# Patient Record
Sex: FEMALE | Race: WHITE | NOT HISPANIC OR LATINO | Employment: OTHER | URBAN - METROPOLITAN AREA
[De-identification: names, ages, dates, MRNs, and addresses within clinical notes are randomized per-mention and may not be internally consistent; named-entity substitution may affect disease eponyms.]

---

## 2017-01-16 ENCOUNTER — OFFICE VISIT (OUTPATIENT)
Dept: UROLOGY | Facility: CLINIC | Age: 82
End: 2017-01-16
Payer: MEDICARE

## 2017-01-16 ENCOUNTER — TELEPHONE (OUTPATIENT)
Dept: INTERNAL MEDICINE | Facility: CLINIC | Age: 82
End: 2017-01-16

## 2017-01-16 VITALS — HEART RATE: 92 BPM | WEIGHT: 107 LBS | OXYGEN SATURATION: 90 % | BODY MASS INDEX: 21.26 KG/M2

## 2017-01-16 DIAGNOSIS — C67.4 MALIGNANT NEOPLASM OF POSTERIOR WALL OF URINARY BLADDER (H): Primary | ICD-10-CM

## 2017-01-16 DIAGNOSIS — N35.9 URETHRAL STRICTURE, UNSPECIFIED STRICTURE TYPE: ICD-10-CM

## 2017-01-16 DIAGNOSIS — C91.10 CLL (CHRONIC LYMPHOCYTIC LEUKEMIA) (H): Primary | ICD-10-CM

## 2017-01-16 LAB
ALBUMIN UR-MCNC: NEGATIVE MG/DL
APPEARANCE UR: CLEAR
BILIRUB UR QL STRIP: NEGATIVE
COLOR UR AUTO: YELLOW
GLUCOSE UR STRIP-MCNC: NEGATIVE MG/DL
HGB UR QL STRIP: NEGATIVE
KETONES UR STRIP-MCNC: NEGATIVE MG/DL
LEUKOCYTE ESTERASE UR QL STRIP: NEGATIVE
NITRATE UR QL: NEGATIVE
PH UR STRIP: 6 PH (ref 5–7)
SP GR UR STRIP: 1.01 (ref 1–1.03)
URN SPEC COLLECT METH UR: NORMAL
UROBILINOGEN UR STRIP-ACNC: 0.2 EU/DL (ref 0.2–1)

## 2017-01-16 PROCEDURE — 52281 CYSTOSCOPY AND TREATMENT: CPT | Performed by: UROLOGY

## 2017-01-16 PROCEDURE — 81003 URINALYSIS AUTO W/O SCOPE: CPT | Performed by: UROLOGY

## 2017-01-16 ASSESSMENT — PAIN SCALES - GENERAL: PAINLEVEL: NO PAIN (0)

## 2017-01-16 NOTE — MR AVS SNAPSHOT
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 We suggest that you continue to avoid caffeine, fruit juice, and alcohol for the next 24 hours, however, you are encouraged to return to your normal activities.       A few things that are considered normal after your cystoscopy:    * small amount of bleeding (or spotting) that clears within the next 24 hours    * slight burning sensation with urination    * sensation to of needing to avoid more frequently    * the feeling of \"air\" in your urine    * mild discomfort that is relieved with Tylonol        Please contact our office promptly if you:    * develop a fever above 101 degrees    * are unable to urinate    * develop bright red blood that does not stop    * severe pain or swelling        And of course, please contact our office with any concerns or questions [phone]              Follow-ups after your visit        Follow-up notes from your care team     Return in about 4 months (around 5/16/2017) for BTC.      Your next 10 appointments already scheduled     May 09, 2017 10:20 AM   [facility]    [address] [facility] [facility]  [address]  [facility] [id number] [phone]            May 09, 2017 11:00 AM   Level 1 with  " INFUSION CHAIR 11   [facility] ([facility])    [facility]  [address]  [place] [address] [phone]              Who to contact     If you have questions or need follow up information about today's clinic visit or your schedule please contact [facility] directly at [phone].  Normal or non-critical lab and imaging results will be communicated to you by [employer], letter or phone within 4 business days after the clinic has received the results. If you do not hear from us within 7 days, please contact the clinic through [employer] or phone. If you have a critical or abnormal lab result, we will notify you by phone as soon as possible.  Submit refill requests through [employer] or call your pharmacy and they will forward the refill request to us. Please allow 3 business days for your refill to be completed.          Additional Information About Your Visit        [employer] Information     [employer] gives you secure access to your electronic health record. If you see a primary care provider, you can also send messages to your care team and make appointments. If you have questions, please call your primary care clinic.  If you do not have a primary care provider, please call [phone] and they will assist you.        Care EveryWhere ID     This is your Care EveryWhere ID. This could be used by other organizations to access your [facility] medical records  [id number]        Your Vitals Were     Pulse Pulse Oximetry                92 90%           Blood Pressure from Last 3 Encounters:   12/29/16 120/68   10/24/16 128/64   09/15/16 110/62    Weight from Last 3 Encounters:   01/16/17 48.535 kg (107 lb)   12/29/16 48.762 kg (107 lb 8 oz)   10/24/16 49.034 kg (108 lb 1.6 oz)              We Performed the Following     CYSTOSCOPY W DIL URETHRAL STRICTURE/CALIBRATION ([id number])     UA without  [facility]        Primary Care Provider Office Phone # Fax #    Piper Kiser [phone]       [facility] [address]  [facility] [address]        Thank you!     Thank you for choosing [facility] [place]  for your care. Our goal is always to provide you with excellent care. Hearing back from our patients is one way we can continue to improve our services. Please take a few minutes to complete the written survey that you may receive in the mail after your visit with us. Thank you!             Your Updated Medication List - Protect others around you: Learn how to safely use, store and throw away your medicines at www.disposemymeds.org.          This list is accurate as of: 1/16/17  2:17 PM.  Always use your most recent med list.                   Brand Name Dispense Instructions for use    * albuterol 108 (90 BASE) MCG/ACT Inhaler    PROAIR HFA/PROVENTIL HFA/VENTOLIN HFA    1 Inhaler    Inhale 2 puffs into the lungs every 6 hours as needed for wheezing       * albuterol (2.5 MG/3ML) 0.083% neb solution     360 mL    Take 1 vial (2.5 mg) by nebulization every 6 hours as needed for shortness of breath / dyspnea or wheezing       ALEVE PO      Take by mouth 2 times daily (with meals)       atorvastatin 20 MG tablet    LIPITOR    90 tablet    Take 1 tablet (20 mg) by mouth daily       BENADRYL 25 MG capsule   Generic drug:  diphenhydrAMINE      Take 25 mg by mouth At Bedtime       FERROUS GLUCONATE PO      Take 324 mg by mouth daily (with breakfast)       fluticasone-salmeterol 250-50 MCG/DOSE diskus inhaler    ADVAIR    1 Inhaler    Inhale 1 puff into the lungs every 12 hours       furosemide 20 MG tablet    LASIX    30 tablet    Take 1 tablet (20 mg) by mouth daily       gabapentin 300 MG capsule    NEURONTIN    90 capsule    Take 1 capsule (300 mg) by mouth 3 times daily       levofloxacin 500 MG tablet    LEVAQUIN    10 tablet    Take 1  tablet (500 mg) by mouth daily       levothyroxine 75 MCG tablet    SYNTHROID/LEVOTHROID    90 tablet    Take 1 tablet (75 mcg) by mouth daily       metoprolol 25 MG 24 hr tablet    TOPROL-XL    90 tablet    Take 1 tablet (25 mg) by mouth daily       MULTIVITAMIN GUMMIES ADULT PO      Take by mouth daily       * order for DME     3 Month    Equipment being ordered: Oxygen. 2L/minute continuous.       * order for DME     1 each    Equipment being ordered: Nebulizer kit       * order for DME     1 Device    Equipment being ordered: conserving devise for portable O2       * order for DME     1 Device    Equipment being ordered: Oxygen, portable       predniSONE 10 MG tablet    DELTASONE    15 tablet    2 daily for 5 days then 1 daily for 5 days.       traZODone 50 MG tablet    DESYREL    30 tablet    Take 1 tablet (50 mg) by mouth nightly as needed for sleep       umeclidinium 62.5 MCG/INH oral inhaler    INCRUSE ELLIPTA    1 each    Inhale 1 puff (62.5 mcg) into the lungs daily       VITAMIN C PO          vitamin D 2000 UNITS tablet     100 tablet    Take 2,000 Units by mouth daily       zolpidem 5 MG tablet    AMBIEN    30 tablet    Take 1 tablet (5 mg) by mouth nightly as needed for sleep       * Notice:  This list has 6 medication(s) that are the same as other medications prescribed for you. Read the directions carefully, and ask your doctor or other care provider to review them with you.

## 2017-01-16 NOTE — Clinical Note
[facility]  [address], [address]  [place], [place]  [address]                                            TEL:[phone]  FAX:[phone]      Marie Kline  [address] [place] [address] [address]  [facility] [address]          January 19, 2017      To whom this may concern,    Please draw a BMP and a CBC on the patient above. Once results are in- please fax results to Attention: Dr Kiser at [phone].    Dx: C91.10 Chronic Lymphocytic Leukemia          Thank You        Sincerely,      Piper Kiser M.D.

## 2017-01-16 NOTE — PATIENT INSTRUCTIONS
"     AFTER YOUR CYSTOSCOPY         You have just completed a cystoscopy, or \"cysto\", which allowed your physician to learn more about your bladder (or to remove a stent placed after surgery). We suggest that you continue to avoid caffeine, fruit juice, and alcohol for the next 24 hours, however, you are encouraged to return to your normal activities.       A few things that are considered normal after your cystoscopy:    * small amount of bleeding (or spotting) that clears within the next 24 hours    * slight burning sensation with urination    * sensation to of needing to avoid more frequently    * the feeling of \"air\" in your urine    * mild discomfort that is relieved with Tylonol        Please contact our office promptly if you:    * develop a fever above 101 degrees    * are unable to urinate    * develop bright red blood that does not stop    * severe pain or swelling        And of course, please contact our office with any concerns or questions [phone]        "

## 2017-01-16 NOTE — PROGRESS NOTES
Marie Klineclementina 84-year-old with a history of superficial transitional cell carcinoma the bladder to the chair for 4 month visit.  Her last tumor was in February of last year.  She also has a history of CLL and COPD.  She has noticed a more narrow urine stream, no gross hematuria  Medications:reviewed  Allergies: IV contrast  Urinalysis: Normal  Flexible cystoscopy-normal urethral mucosa and one tiny area of erythema on the right posterior lateral wall consistent with inflammation versus an early papillary tumor.  Mild urethral stenosis noted and dilated with a 20 Mongolian Magnus sound  Assessment: Mild urethral stenosis, history of transitional cell carcinoma of the bladder-possible early papillary tumor right posterior lateral wall  Plan: Macrobid ×1 tonight, follow up in 4 months for office cystoscopy

## 2017-01-16 NOTE — Clinical Note
1/16/2017      RE: Marie Kline  [address] [facility] [address] [facility]    [facility] [facility] [address]       Marie Klinephysical 84-year-old with a history of superficial transitional cell carcinoma the bladder to the chair for 4 month visit.  Her last tumor was in February of last year.  She also has a history of CLL and COPD.  She has noticed a more narrow urine stream, no gross hematuria  Medications:reviewed  Allergies: IV contrast  Urinalysis: Normal  Flexible cystoscopy-normal urethral mucosa and one tiny area of erythema on the right posterior lateral wall consistent with inflammation versus an early papillary tumor.  Mild urethral stenosis noted and dilated with a 20 Kyr[place]z [place] sound  Assessment: Mild urethral stenosis, history of transitional cell carcinoma of the bladder-possible early papillary tumor right posterior lateral wall  Plan: Macrobid ×1 tonight, follow up in 4 months for office cystoscopy        Kar Nuñez MD

## 2017-01-16 NOTE — TELEPHONE ENCOUNTER
Pt walks in, states to pt representative that her jaw is clicking and her dentist is wondering if may be r/t Prolia. Pt had to leave before a triage nurse could discuss this with her.     Please advise, thanks.

## 2017-01-17 ENCOUNTER — MYC MEDICAL ADVICE (OUTPATIENT)
Dept: INTERNAL MEDICINE | Facility: CLINIC | Age: 82
End: 2017-01-17

## 2017-01-17 NOTE — TELEPHONE ENCOUNTER
Patient advised of results. Patient is wondering if you could order lab for her calcium levels through her oncologist Dr Ivory [phone]. Please advise.  MATTHEW HUMPHRIES,KEIKO

## 2017-01-17 NOTE — TELEPHONE ENCOUNTER
"Unlikely secondary to Prolia if just clicking noise.  Per uptodate, signs of possible osteonecrosis include:  \"ONJ may manifest as jaw pain, osteomyelitis, osteitis, bone erosion, tooth/periodontal infection, toothache, gingival ulceration/erosion\"    Consider TMJ.   "

## 2017-01-19 NOTE — TELEPHONE ENCOUNTER
Letter completed and set on Dr Kiser's desk- please fax once signed by Dr Kiser. Pt advised.     Enc closed.

## 2017-01-23 ENCOUNTER — TRANSFERRED RECORDS (OUTPATIENT)
Dept: HEALTH INFORMATION MANAGEMENT | Facility: CLINIC | Age: 82
End: 2017-01-23

## 2017-01-23 LAB
ALT SERPL-CCNC: 10 IU/L (ref 7–52)
AST SERPL-CCNC: 24 U/L (ref 13–39)
CREAT SERPL-MCNC: 1.07 MG/DL (ref 0.6–1.3)
GLUCOSE SERPL-MCNC: 74 MG/DL (ref 70–110)
POTASSIUM SERPL-SCNC: 3.8 MMOL/L (ref 3.5–5.1)

## 2017-01-31 ENCOUNTER — OFFICE VISIT (OUTPATIENT)
Dept: DERMATOLOGY | Facility: CLINIC | Age: 82
End: 2017-01-31
Payer: MEDICARE

## 2017-01-31 DIAGNOSIS — D69.2 PURPURA SENILIS (H): Primary | ICD-10-CM

## 2017-01-31 DIAGNOSIS — L85.3 XEROSIS CUTIS: ICD-10-CM

## 2017-01-31 PROCEDURE — 99202 OFFICE O/P NEW SF 15 MIN: CPT | Performed by: DERMATOLOGY

## 2017-01-31 NOTE — Clinical Note
1/31/2017      RE: Marie Kline  [address]  [place] [address]               DERMATOLOGY CLINIC VISIT NOTE      CHIEF COMPLAINT:  Purple marks on legs.      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      HISTORY OF PRESENT ILLNESS:  Ms. Kline is an 84-year-old female presenting to Dermatology Clinic for initial evaluation of purple marks on her legs.  She was seen at the request of her oncologist, Dr. Isabel Ivory with [facility] in [place].  The patient was sent for evaluation of purple marks on her bilateral lower extremities.  The patient has past history of chronic lymphocytic leukemia in addition to past history of bladder cancer.      The patient states she is unsure as to why she is being seen in Dermatology.  She notes that her skin bruises quite easily and is susceptible to trauma.  She has history of COPD and has been on many courses of systemic steroids during her lifetime.  She notes that the bruising occurs both on her legs as well as on her arms.  She notes that the bruising has not worsened over time.  She has otherwise been feeling well.   Patient Active Problem List   Diagnosis     Acute and chronic respiratory failure with hypercapnia (HCC)     Nonischemic cardiomyopathy (H)     Pneumonia     Acute myocardial infarction, initial episode of care (HCC)     CLL (chronic lymphocytic leukemia) (HCC)     CHF (congestive heart failure) (HCC)     Cardiomyopathy in other diseases classified elsewhere (HCC)     Hyperlipidemia with target LDL less than 130     Health Care Home     COPD exacerbation (H)     LESLY (generalized anxiety disorder)     Hypothyroidism     Back pain with radiation     DDD (degenerative disc disease), lumbar     Splenomegaly     Lumbar degenerative disc disease     Lumbar foraminal stenosis     Central spinal stenosis     Bladder cancer (H)      Sepsis (H)     ACP (advance care planning)     Senile osteoporosis     CKD (chronic kidney disease) stage 3, GFR 30-59 ml/min       Allergies   Allergen Reactions     Contrast Dye Hives         Current Outpatient Prescriptions   Medication     Multiple Vitamins-Minerals (MULTIVITAMIN GUMMIES ADULT PO)     furosemide (LASIX) 20 MG tablet     levofloxacin (LEVAQUIN) 500 MG tablet     predniSONE (DELTASONE) 10 MG tablet     metoprolol (TOPROL-XL) 25 MG 24 hr tablet     traZODone (DESYREL) 50 MG tablet     Ascorbic Acid (VITAMIN C PO)     Naproxen Sodium (ALEVE PO)     umeclidinium (INCRUSE ELLIPTA) 62.5 MCG/INH oral inhaler     albuterol (2.5 MG/3ML) 0.083% nebulizer solution     zolpidem (AMBIEN) 5 MG tablet     gabapentin (NEURONTIN) 300 MG capsule     FERROUS GLUCONATE PO     levothyroxine (SYNTHROID, LEVOTHROID) 75 MCG tablet     albuterol (PROAIR HFA, PROVENTIL HFA, VENTOLIN HFA) 108 (90 BASE) MCG/ACT inhaler     atorvastatin (LIPITOR) 20 MG tablet     diphenhydrAMINE (BENADRYL) 25 MG capsule     order for DME     fluticasone-salmeterol (ADVAIR) 250-50 MCG/DOSE diskus inhaler     order for DME     order for DME     order for DME     Cholecalciferol (VITAMIN D) 2000 UNITS tablet     No current facility-administered medications for this visit.            SOCIAL HISTORY:  The patient is .  She lives in [place].  She has multiple family  members who live in the area and take her to medical appointments.      REVIEW OF SYSTEMS:  Feeling well without additional skin concerns.  No other symptoms of lumps or bumps on the skin or epistaxis or bleeding gums.      PHYSICAL EXAMINATION:   There were no vitals taken for this visit.    GENERAL:  Ms. Kline is a healthy-appearing 84-year-old female in no distress.   HEENT:  Conjunctivae are clear.   PULMONARY:  Breathing comfortably on room air.   SKIN:  Examination included the bilateral arms, legs, face and neck.  Skin exam was normal except for as follows:   --  Examination of the left calf shows a linear collection of purpuric patches extending from the ankles to the midcalf.  There are smaller linear collections of purpuric patches on the lateral calf.   -- There is a linear collection of purpuric patches on the right calf at the site of past injury by her dog's paws.  Skin is overall atrophic.  It is diffusely xerotic.     -- Scattered ecchymotic patches on the bilateral forearms.      ASSESSMENT AND PLAN:    Senile purpura in the setting of chronic corticosteroid use.  I see no signs of leukocytoclastic vasculitis on the extremities.  The purpura is in linear arrays that correspond quite well with exogenous sources such as scratching of the skin.  I did not suggest biopsy today given low suspicion for an underlying vasculitic process.  I suspect that the patient will have ongoing purpura in the skin from skin atrophy related to her chronic steroid exposure.      Should Dr. Ivory with Hematology/Oncology feel that biopsy is warranted to rule out vasculitis, Ms. Kline may return to Dermatology Clinic.        In regard to her xerosis, I suggested use of a thick emollient and a gentle cleanser when bathing.      The patient to return as needed.       Jeannette Harris MD  Dermatology Staff       cc:   Isabel Ivory MD   [facility]   [address], [facility] [address]   [facility] [address]      Piper Kiser MD   [facility]   [address] [place] [address]                    D: 2017 16:34   T: 2017 11:25   MT: SURAJ      Name:     MAGGI KLINE   MRN:      [id number]        Account:      [id number]   :      1932           Service Date: 2017      Document: [id number]      Jeannette Harris MD

## 2017-01-31 NOTE — PATIENT INSTRUCTIONS
Pediatric Dermatology  [facility]  [address]  1st [place]  [address]  Phone: [phone]    Pediatric & Adult Dermatology  [facility]  [address] [facility]   2nd Floor  [facility] [address]  [phone]                  General information: Dr. Jeannette Harris is a board-certified dermatologist with subspecialty certification in pediatric dermatology.     Scheduling and Nurse Triage: Dr. Harris sees pediatric patients on Mondays in [place] and adult and pediatric patients on Tuesdays in [place]. The remainder of the week she practices at the [facility]. Please call the above phone numbers to schedule or to talk to a nurse.     -For scheduling at the [place] or [place] locations, or to talk to the triage nurse please call the above phone number at the clinic where you were seen.     -For medication refills, please call your pharmacy.           Pediatric Dermatology  [address]  [place] [address]  [phone]    Gentle Skin Care  Below is a list of products our providers recommend for gentle skin care.  Moisturizers:    Lighter; Cetaphil Cream, CeraVe, Aveeno and Vanicream Light     Thicker; Aquaphor Ointment, Vaseline, Petrolium Jelly, Eucerin and Vanicream    Avoid Lotions (too thin)  Mild Cleansers:    Dove- Fragrance Free    CeraVe     Vanicream Cleansing Bar    Cetaphil Cleanser     Aquaphor 2 in1 Gentle Wash and Shampoo       Laundry Products:    All Free and Clear    Cheer Free    Generic Brands are okay as long as they are  Fragrance Free      Avoid fabric softeners  and dryer sheets   Sunscreens: SPF 30 or greater     Sunscreens that contain Zinc Oxide or Titanium Dioxide should be applied, these are physical blockers. Spray or  chemical  sunscreens should be avoided.        Shampoo and Conditioners:    Free  "and Clear by Vanicream    Aquaphor 2 in 1 Gentle Wash and Shampoo    California Baby  super sensitive   Oils:    Mineral Oil     Emu Oil     For some patients, coconut and sunflower seed oil      Generic Products are an okay substitute, but make sure they are fragrance free.  *Avoid product that have fragrance added to them. Organic does not mean  fragrance free.  In fact patients with sensitive skin can become quite irritated by organic products.     1. Daily bathing is recommended. Make sure you are applying a good moisturizer after bathing every time.  2. Use Moisturizing creams at least twice daily to the whole body. Your provider may recommend a lighter or heavier moisturizer based on your child s severity and that time of year it is.  3. Creams are more moisturizing than lotions  4. Products should be fragrance free- soaps, creams, detergents.  Products such as Maurice and Maurice as well as the Cetaphil \"Baby\" line contain fragrance and may irritate your child's sensitive skin.    Care Plan:  1. Keep bathing and showering short, less than 15 minutes   2. Always use lukewarm warm when possible. AVOID very HOT or COLD water  3. DO NOT use bubble bath  4. Limit the use of soaps. Focus on the skin folds, face, armpits, groin and feet  5. Do NOT vigorously scrub when you cleanse your skin  6. After bathing, PAT your skin lightly with a towel. DO NOT rub or scrub when drying  7. ALWAYS apply a moisturizer immediately after bathing. This helps to  lock in  the moisture. * IF YOU WERE PRESCRIBED A TOPICAL MEDICATION, APPLY YOUR MEDICATION FIRST THEN COVER WITH YOUR DAILY MOISTURIZER  8. Reapply moisturizing agents at least twice daily to your whole body  9. Do not use products such as powders, perfumes, or colognes on your skin  10. Avoid saunas and steam baths. This temperature is too HOT  11. Avoid tight or  scratchy  clothing such as wool  12. Always wash new clothing before wearing them for the first " time  13. Sometimes a humidifier or vaporizer can be used at night can help the dry skin. Remember to keep it clean to avoid mold growth.  14.

## 2017-01-31 NOTE — NURSING NOTE
"Chief Complaint   Patient presents with     Consult     spots on both legs front and back        Initial There were no vitals taken for this visit. Estimated body mass index is 21.26 kg/(m^2) as calculated from the following:    Height as of 10/24/16: 4' 11.5\" (1.511 m).    Weight as of 1/16/17: 107 lb (48.535 kg).  BP completed using cuff size: NA (Not Taken)  Marcia Abdullahi MA      "

## 2017-01-31 NOTE — Clinical Note
1/31/2017      RE: Marie Kline  [address]  [facility] [address]               DERMATOLOGY CLINIC VISIT NOTE      CHIEF COMPLAINT:  Purple marks on legs.      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      HISTORY OF PRESENT ILLNESS:  Ms. Kline is an 84-year-old female presenting to Dermatology Clinic for initial evaluation of purple marks on her legs.  She was seen at the request of her oncologist, Dr. Isabel Ivory with [facility] in [place].  The patient was sent for evaluation of purple marks on her bilateral lower extremities.  The patient has past history of chronic lymphocytic leukemia in addition to past history of bladder cancer.      The patient states she is unsure as to why she is being seen in Dermatology.  She notes that her skin bruises quite easily and is susceptible to trauma.  She has history of COPD and has been on many courses of systemic steroids during her lifetime.  She notes that the bruising occurs both on her legs as well as on her arms.  She notes that the bruising has not worsened over time.  She has otherwise been feeling well.   Patient Active Problem List   Diagnosis     Acute and chronic respiratory failure with hypercapnia (HCC)     Nonischemic cardiomyopathy (H)     Pneumonia     Acute myocardial infarction, initial episode of care (HCC)     CLL (chronic lymphocytic leukemia) (HCC)     CHF (congestive heart failure) (HCC)     Cardiomyopathy in other diseases classified elsewhere (HCC)     Hyperlipidemia with target LDL less than 130     Health Care Home     COPD exacerbation (H)     LESLY (generalized anxiety disorder)     Hypothyroidism     Back pain with radiation     DDD (degenerative disc disease), lumbar     Splenomegaly     Lumbar degenerative disc disease     Lumbar foraminal stenosis     Central spinal stenosis     Bladder cancer (H)     Sepsis  (H)     ACP (advance care planning)     Senile osteoporosis     CKD (chronic kidney disease) stage 3, GFR 30-59 ml/min       Allergies   Allergen Reactions     Contrast Dye Hives         Current Outpatient Prescriptions   Medication     Multiple Vitamins-Minerals (MULTIVITAMIN GUMMIES ADULT PO)     furosemide (LASIX) 20 MG tablet     levofloxacin (LEVAQUIN) 500 MG tablet     predniSONE (DELTASONE) 10 MG tablet     metoprolol (TOPROL-XL) 25 MG 24 hr tablet     traZODone (DESYREL) 50 MG tablet     Ascorbic Acid (VITAMIN C PO)     Naproxen Sodium (ALEVE PO)     umeclidinium (INCRUSE ELLIPTA) 62.5 MCG/INH oral inhaler     albuterol (2.5 MG/3ML) 0.083% nebulizer solution     zolpidem (AMBIEN) 5 MG tablet     gabapentin (NEURONTIN) 300 MG capsule     FERROUS GLUCONATE PO     levothyroxine (SYNTHROID, LEVOTHROID) 75 MCG tablet     albuterol (PROAIR HFA, PROVENTIL HFA, VENTOLIN HFA) 108 (90 BASE) MCG/ACT inhaler     atorvastatin (LIPITOR) 20 MG tablet     diphenhydrAMINE (BENADRYL) 25 MG capsule     order for DME     fluticasone-salmeterol (ADVAIR) 250-50 MCG/DOSE diskus inhaler     order for DME     order for DME     order for DME     Cholecalciferol (VITAMIN D) 2000 UNITS tablet     No current facility-administered medications for this visit.            SOCIAL HISTORY:  The patient is .  She lives in [place].  She has multiple family  members who live in the area and take her to medical appointments.      REVIEW OF SYSTEMS:  Feeling well without additional skin concerns.  No other symptoms of lumps or bumps on the skin or epistaxis or bleeding gums.      PHYSICAL EXAMINATION:   There were no vitals taken for this visit.    GENERAL:  Ms. Kline is a healthy-appearing 84-year-old female in no distress.   HEENT:  Conjunctivae are clear.   PULMONARY:  Breathing comfortably on room air.   SKIN:  Examination included the bilateral arms, legs, face and neck.  Skin exam was normal except for as follows:   -- Examination  of the left calf shows a linear collection of purpuric patches extending from the ankles to the midcalf.  There are smaller linear collections of purpuric patches on the lateral calf.   -- There is a linear collection of purpuric patches on the right calf at the site of past injury by her dog's paws.  Skin is overall atrophic.  It is diffusely xerotic.     -- Scattered ecchymotic patches on the bilateral forearms.      ASSESSMENT AND PLAN:    Senile purpura in the setting of chronic corticosteroid use.  I see no signs of leukocytoclastic vasculitis on the extremities.  The purpura is in linear arrays that correspond quite well with exogenous sources such as scratching of the skin.  I did not suggest biopsy today given low suspicion for an underlying vasculitic process.  I suspect that the patient will have ongoing purpura in the skin from skin atrophy related to her chronic steroid exposure.      Should Dr. Ivory with Hematology/Oncology feel that biopsy is warranted to rule out vasculitis, Ms. Kline may return to Dermatology Clinic.        In regard to her xerosis, I suggested use of a thick emollient and a gentle cleanser when bathing.      The patient to return as needed.       Jeannette Harris MD  Dermatology Staff       cc:   Isabel Ivory MD   [facility]   [address], [facility] [address]   [facility] [address]      Piper Kiser MD   [facility]   [address] [place] [address]            D: 2017 16:34   T: 2017 11:25   MT: SURAJ      Name:     MAGGI KLINE   MRN:      [id number]        Account:      [id number]   :      1932           Service Date: 2017      Document: [id number]

## 2017-01-31 NOTE — MR AVS SNAPSHOT
After Visit Summary   1/31/2017    Marie Kline    MRN: [id number]           Patient Information     Date Of Birth          4/28/1932        Visit Information        Provider Department      1/31/2017 1:15 PM Jeannette Harris MD [facility] Instructions                    Pediatric Dermatology  [facility]  [address]  1st [place]  [address]  Phone: [phone]    Pediatric & Adult Dermatology  [facility]  [address]  [address] [facility] [address]  [phone]                  General information: Dr. Jeannette Harris is a board-certified dermatologist with subspecialty certification in pediatric dermatology.     Scheduling and Nurse Triage: Dr. Harris sees pediatric patients on Mondays in [place] and adult and pediatric patients on Tuesdays in [place]. The remainder of the week she practices at the [facility]. Please call the above phone numbers to schedule or to talk to a nurse.     -For scheduling at the [place] or [place] locations, or to talk to the triage nurse please call the above phone number at the clinic where you were seen.     -For medication refills, please call your pharmacy.           Pediatric Dermatology  [address]  [place], [place] [address] [phone]    Gentle Skin Care  Below is a list of products our providers recommend for gentle skin care.  Moisturizers:    Lighter; Cetaphil Cream, CeraVe, Aveeno and Vanicream Light     Thicker; Aquaphor Ointment, Vaseline, Petrolium Jelly, Eucerin and Vanicream    Avoid Lotions (too thin)  Mild Cleansers:    Dove- Fragrance Free    CeraVe     Vanicream Cleansing Bar    Cetaphil Cleanser     Aquaphor 2 in1 Gentle Wash and Shampoo       Laundry Products:    All Free and Clear    Cheer Free    Generic Brands are okay as long as  "they are  Fragrance Free      Avoid fabric softeners  and dryer sheets   Sunscreens: SPF 30 or greater     Sunscreens that contain Zinc Oxide or Titanium Dioxide should be applied, these are physical blockers. Spray or  chemical  sunscreens should be avoided.        Shampoo and Conditioners:    Free and Clear by Vanicream    Aquaphor 2 in 1 Gentle Wash and Shampoo    California Baby  super sensitive   Oils:    Mineral Oil     Emu Oil     For some patients, coconut and sunflower seed oil      Generic Products are an okay substitute, but make sure they are fragrance free.  *Avoid product that have fragrance added to them. Organic does not mean  fragrance free.  In fact patients with sensitive skin can become quite irritated by organic products.     1. Daily bathing is recommended. Make sure you are applying a good moisturizer after bathing every time.  2. Use Moisturizing creams at least twice daily to the whole body. Your provider may recommend a lighter or heavier moisturizer based on your child s severity and that time of year it is.  3. Creams are more moisturizing than lotions  4. Products should be fragrance free- soaps, creams, detergents.  Products such as Maurice and Maurice as well as the Cetaphil \"Baby\" line contain fragrance and may irritate your child's sensitive skin.    Care Plan:  1. Keep bathing and showering short, less than 15 minutes   2. Always use lukewarm warm when possible. AVOID very HOT or COLD water  3. DO NOT use bubble bath  4. Limit the use of soaps. Focus on the skin folds, face, armpits, groin and feet  5. Do NOT vigorously scrub when you cleanse your skin  6. After bathing, PAT your skin lightly with a towel. DO NOT rub or scrub when drying  7. ALWAYS apply a moisturizer immediately after bathing. This helps to  lock in  the moisture. * IF YOU WERE PRESCRIBED A TOPICAL MEDICATION, APPLY YOUR MEDICATION FIRST THEN COVER WITH YOUR DAILY MOISTURIZER  8. Reapply moisturizing agents at " least twice daily to your whole body  9. Do not use products such as powders, perfumes, or colognes on your skin  10. Avoid saunas and steam baths. This temperature is too HOT  11. Avoid tight or  scratchy  clothing such as wool  12. Always wash new clothing before wearing them for the first time  13. Sometimes a humidifier or vaporizer can be used at night can help the dry skin. Remember to keep it clean to avoid mold growth.  14.         Follow-ups after your visit        Your next 10 appointments already scheduled     May 09, 2017 10:20 AM   Cystoscopy with Kar Nuñez MD, UB CYF   [facility] ([facility])    [address]  [address]  [facility] [address] [phone]            May 09, 2017 11:00 AM   Level 1 with RH INFUSION CHAIR 11   [facility] ([facility])    [facility]  [address]  [facility] [address] [phone]              Who to contact     If you have questions or need follow up information about today's clinic visit or your schedule please contact [facility] directly at [phone].  Normal or non-critical lab and imaging results will be communicated to you by Arbovaxhart, letter or phone within 4 business days after the clinic has received the results. If you do not hear from us within 7 days, please contact the clinic through Arbovaxhart or phone. If you have a critical or abnormal lab result, we will notify you by phone as soon as possible.  Submit refill requests through [email or web address] or call your pharmacy and they will forward the refill request to us. Please allow 3 business days for your refill to be completed.          Additional Information About Your Visit        [email or web address] Information     [email or web address] gives you secure access to your electronic health record. If you see a primary care provider, you can also send messages to  your care team and make appointments. If you have questions, please call your primary care clinic.  If you do not have a primary care provider, please call [phone] and they will assist you.        Care EveryWhere ID     This is your Care EveryWhere ID. This could be used by other organizations to access your [place] medical records  [id number]         Blood Pressure from Last 3 Encounters:   12/29/16 120/68   10/24/16 128/64   09/15/16 110/62    Weight from Last 3 Encounters:   01/16/17 107 lb (48.535 kg)   12/29/16 107 lb 8 oz (48.762 kg)   10/24/16 108 lb 1.6 oz (49.034 kg)              Today, you had the following     No orders found for display       Primary Care Provider Office Phone # Fax #    Piper Kiser [phone]       [facility] [address] [place] [address]        Thank you!     Thank you for choosing [facility]  for your care. Our goal is always to provide you with excellent care. Hearing back from our patients is one way we can continue to improve our services. Please take a few minutes to complete the written survey that you may receive in the mail after your visit with us. Thank you!             Your Updated Medication List - Protect others around you: Learn how to safely use, store and throw away your medicines at www.disposemymeds.org.          This list is accurate as of: 1/31/17  1:51 PM.  Always use your most recent med list.                   Brand Name Dispense Instructions for use    * albuterol 108 (90 BASE) MCG/ACT Inhaler    PROAIR HFA/PROVENTIL HFA/VENTOLIN HFA    1 Inhaler    Inhale 2 puffs into the lungs every 6 hours as needed for wheezing       * albuterol (2.5 MG/3ML) 0.083% neb solution     360 mL    Take 1 vial (2.5 mg) by nebulization every 6 hours as needed for shortness of breath / dyspnea or wheezing       ALEVE PO      Take by mouth 2 times daily (with meals)       atorvastatin 20 MG tablet    LIPITOR    90  tablet    Take 1 tablet (20 mg) by mouth daily       BENADRYL 25 MG capsule   Generic drug:  diphenhydrAMINE      Take 25 mg by mouth At Bedtime       FERROUS GLUCONATE PO      Take 324 mg by mouth daily (with breakfast)       fluticasone-salmeterol 250-50 MCG/DOSE diskus inhaler    ADVAIR    1 Inhaler    Inhale 1 puff into the lungs every 12 hours       furosemide 20 MG tablet    LASIX    30 tablet    Take 1 tablet (20 mg) by mouth daily       gabapentin 300 MG capsule    NEURONTIN    90 capsule    Take 1 capsule (300 mg) by mouth 3 times daily       levofloxacin 500 MG tablet    LEVAQUIN    10 tablet    Take 1 tablet (500 mg) by mouth daily       levothyroxine 75 MCG tablet    SYNTHROID/LEVOTHROID    90 tablet    Take 1 tablet (75 mcg) by mouth daily       metoprolol 25 MG 24 hr tablet    TOPROL-XL    90 tablet    Take 1 tablet (25 mg) by mouth daily       MULTIVITAMIN GUMMIES ADULT PO      Take by mouth daily       * order for DME     3 Month    Equipment being ordered: Oxygen. 2L/minute continuous.       * order for DME     1 each    Equipment being ordered: Nebulizer kit       * order for DME     1 Device    Equipment being ordered: conserving devise for portable O2       * order for DME     1 Device    Equipment being ordered: Oxygen, portable       predniSONE 10 MG tablet    DELTASONE    15 tablet    2 daily for 5 days then 1 daily for 5 days.       traZODone 50 MG tablet    DESYREL    30 tablet    Take 1 tablet (50 mg) by mouth nightly as needed for sleep       umeclidinium 62.5 MCG/INH oral inhaler    INCRUSE ELLIPTA    1 each    Inhale 1 puff (62.5 mcg) into the lungs daily       VITAMIN C PO          vitamin D 2000 UNITS tablet     100 tablet    Take 2,000 Units by mouth daily       zolpidem 5 MG tablet    AMBIEN    30 tablet    Take 1 tablet (5 mg) by mouth nightly as needed for sleep       * Notice:  This list has 6 medication(s) that are the same as other medications prescribed for you. Read the  directions carefully, and ask your doctor or other care provider to review them with you.

## 2017-02-01 NOTE — PROGRESS NOTES
DERMATOLOGY CLINIC VISIT NOTE      CHIEF COMPLAINT:  Purple marks on legs.      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      HISTORY OF PRESENT ILLNESS:  Ms. Kline is an 84-year-old female presenting to Dermatology Clinic for initial evaluation of purple marks on her legs.  She was seen at the request of her oncologist, Dr. Isabel Ivory with [facility] in [place].  The patient was sent for evaluation of purple marks on her bilateral lower extremities.  The patient has past history of chronic lymphocytic leukemia in addition to past history of bladder cancer.      The patient states she is unsure as to why she is being seen in Dermatology.  She notes that her skin bruises quite easily and is susceptible to trauma.  She has history of COPD and has been on many courses of systemic steroids during her lifetime.  She notes that the bruising occurs both on her legs as well as on her arms.  She notes that the bruising has not worsened over time.  She has otherwise been feeling well.   Patient Active Problem List   Diagnosis     Acute and chronic respiratory failure with hypercapnia (HCC)     Nonischemic cardiomyopathy (H)     Pneumonia     Acute myocardial infarction, initial episode of care (HCC)     CLL (chronic lymphocytic leukemia) (HCC)     CHF (congestive heart failure) (HCC)     Cardiomyopathy in other diseases classified elsewhere (HCC)     Hyperlipidemia with target LDL less than 130     Health Care Home     COPD exacerbation (H)     LESLY (generalized anxiety disorder)     Hypothyroidism     Back pain with radiation     DDD (degenerative disc disease), lumbar     Splenomegaly     Lumbar degenerative disc disease     Lumbar foraminal stenosis     Central spinal stenosis     Bladder cancer (H)     Sepsis (H)     ACP (advance care planning)     Senile osteoporosis     CKD (chronic kidney disease)  stage 3, GFR 30-59 ml/min       Allergies   Allergen Reactions     Contrast Dye Hives         Current Outpatient Prescriptions   Medication     Multiple Vitamins-Minerals (MULTIVITAMIN GUMMIES ADULT PO)     furosemide (LASIX) 20 MG tablet     levofloxacin (LEVAQUIN) 500 MG tablet     predniSONE (DELTASONE) 10 MG tablet     metoprolol (TOPROL-XL) 25 MG 24 hr tablet     traZODone (DESYREL) 50 MG tablet     Ascorbic Acid (VITAMIN C PO)     Naproxen Sodium (ALEVE PO)     umeclidinium (INCRUSE ELLIPTA) 62.5 MCG/INH oral inhaler     albuterol (2.5 MG/3ML) 0.083% nebulizer solution     zolpidem (AMBIEN) 5 MG tablet     gabapentin (NEURONTIN) 300 MG capsule     FERROUS GLUCONATE PO     levothyroxine (SYNTHROID, LEVOTHROID) 75 MCG tablet     albuterol (PROAIR HFA, PROVENTIL HFA, VENTOLIN HFA) 108 (90 BASE) MCG/ACT inhaler     atorvastatin (LIPITOR) 20 MG tablet     diphenhydrAMINE (BENADRYL) 25 MG capsule     order for DME     fluticasone-salmeterol (ADVAIR) 250-50 MCG/DOSE diskus inhaler     order for DME     order for DME     order for DME     Cholecalciferol (VITAMIN D) 2000 UNITS tablet     No current facility-administered medications for this visit.            SOCIAL HISTORY:  The patient is .  She lives in [place].  She has multiple family  members who live in the area and take her to medical appointments.      REVIEW OF SYSTEMS:  Feeling well without additional skin concerns.  No other symptoms of lumps or bumps on the skin or epistaxis or bleeding gums.      PHYSICAL EXAMINATION:   There were no vitals taken for this visit.    GENERAL:  Ms. Kline is a healthy-appearing 84-year-old female in no distress.   HEENT:  Conjunctivae are clear.   PULMONARY:  Breathing comfortably on room air.   SKIN:  Examination included the bilateral arms, legs, face and neck.  Skin exam was normal except for as follows:   -- Examination of the left calf shows a linear collection of purpuric patches extending from the ankles to  the midcalf.  There are smaller linear collections of purpuric patches on the lateral calf.   -- There is a linear collection of purpuric patches on the right calf at the site of past injury by her dog's paws.  Skin is overall atrophic.  It is diffusely xerotic.     -- Scattered ecchymotic patches on the bilateral forearms.      ASSESSMENT AND PLAN:    Senile purpura in the setting of chronic corticosteroid use.  I see no signs of leukocytoclastic vasculitis on the extremities.  The purpura is in linear arrays that correspond quite well with exogenous sources such as scratching of the skin.  I did not suggest biopsy today given low suspicion for an underlying vasculitic process.  I suspect that the patient will have ongoing purpura in the skin from skin atrophy related to her chronic steroid exposure.      Should Dr. Ivory with Hematology/Oncology feel that biopsy is warranted to rule out vasculitis, Ms. Kline may return to Dermatology Clinic.        In regard to her xerosis, I suggested use of a thick emollient and a gentle cleanser when bathing.      The patient to return as needed.       Jeannette Harris MD  Dermatology Staff       cc:   Isabel Ivory MD   [facility]   [address], [facility] [address]   [facility] [address]      Piper Kiser MD   [facility]   [address] [place] [address]                    D: 2017 16:34   T: 2017 11:25   MT: SURAJ      Name:     MAGGI KLINE   MRN:      [id number]        Account:      [id number]   :      1932           Service Date: 2017      Document: [id number]

## 2017-02-03 PROBLEM — D69.2 PURPURA SENILIS (H): Status: ACTIVE | Noted: 2017-02-03

## 2017-02-03 PROBLEM — L85.3 XEROSIS CUTIS: Status: ACTIVE | Noted: 2017-02-03

## 2017-02-15 DIAGNOSIS — E78.5 HYPERLIPIDEMIA WITH TARGET LDL LESS THAN 130: ICD-10-CM

## 2017-02-15 DIAGNOSIS — I24.9 ACS (ACUTE CORONARY SYNDROME) (H): ICD-10-CM

## 2017-02-15 RX ORDER — ATORVASTATIN CALCIUM 20 MG/1
20 TABLET, FILM COATED ORAL DAILY
Qty: 90 TABLET | Refills: 0 | Status: SHIPPED | OUTPATIENT
Start: 2017-02-15 | End: 2017-05-13

## 2017-02-15 NOTE — TELEPHONE ENCOUNTER
Pt calls for refill of Atorvastatin.     Atorvastatin 20mg     Last Written Prescription Date: 6/8/16  Last Fill Quantity: 90, # refills: 1  Last Office Visit with [facility], [facility] or [facility] prescribing provider: 12/29/16       Lab Results   Component Value Date    CHOL 167 10/09/2014     Lab Results   Component Value Date    HDL 55 10/09/2014     Lab Results   Component Value Date    LDL 88 10/09/2014     Lab Results   Component Value Date    TRIG 121 10/09/2014     Lab Results   Component Value Date    CHOLHDLRATIO 3.0 10/09/2014     Pt informed that she is due for cholesterol levels. She thought this was being done at [facility]. Recent labs from [facility] do not include lipid panel. Pt due for next infusion the last Monday of this month, would like to have this drawn at that visit. Order placed for this and faxed to [facility], fax [phone].    Medication is being filled for 1 time refill only due to:  Patient needs labs for cholesterol levels.

## 2017-02-16 DIAGNOSIS — J45.20 INTERMITTENT ASTHMA WITHOUT COMPLICATION: ICD-10-CM

## 2017-02-16 DIAGNOSIS — J44.1 COPD EXACERBATION (H): ICD-10-CM

## 2017-02-16 RX ORDER — ALBUTEROL SULFATE 90 UG/1
2 AEROSOL, METERED RESPIRATORY (INHALATION) EVERY 6 HOURS PRN
Qty: 1 INHALER | Refills: 10 | Status: SHIPPED | OUTPATIENT
Start: 2017-02-16 | End: 2017-08-21

## 2017-02-16 NOTE — TELEPHONE ENCOUNTER
Pt calls, she is at the pharmacy and asked for refills of Advair and Albuterol inhaler.     Advair, albuterol inhaler       Last Written Prescription Date: 6/8/16; 3/15/16  Last Fill Quantity: 1, # refills: 8; 3   Last Office Visit with [facility], [facility] or [facility] prescribing provider:  12/29/16   Future Office Visit:       Date of Last Asthma Action Plan Letter:   There are no preventive care reminders to display for this patient.   Asthma Control Test: No flowsheet data found.    Date of Last Spirometry Test:   No results found for this or any previous visit.      Taken for COPD.  Prescription approved per [facility] Refill Protocol.

## 2017-02-23 ENCOUNTER — TELEPHONE (OUTPATIENT)
Dept: INTERNAL MEDICINE | Facility: CLINIC | Age: 82
End: 2017-02-23

## 2017-02-23 NOTE — TELEPHONE ENCOUNTER
PA request from [employer] came in the mail for Proair HFA inhalers  Not on their formulary    Exceptions and appeals call [facility]: [phone]    [employer] is part if Medicare Part D    Copy of PA in folder

## 2017-02-23 NOTE — TELEPHONE ENCOUNTER
"Called pharmacy and asked them if they could try running either the Ventolin or Proventil through and see if it is covered. When they do that, it comes back as \"too early to fill\". She can fill it on 2/25/17, so we will need to call them back then and see if they can run them through then.   "

## 2017-02-27 ENCOUNTER — TRANSFERRED RECORDS (OUTPATIENT)
Dept: HEALTH INFORMATION MANAGEMENT | Facility: CLINIC | Age: 82
End: 2017-02-27

## 2017-02-28 NOTE — TELEPHONE ENCOUNTER
Pt picked up both inhalers and paid copay out of pocket. However for future reference, Ventolin is the preferred albuterol for her drug formulary. Called pharmacy to let them know for future reference to run it as ventolin. Pharmacist informed me that for the next Rx please write somewhere that Ventolin HFA is the preferred inhaler.  Irish Stoddard MA

## 2017-03-03 ENCOUNTER — TELEPHONE (OUTPATIENT)
Dept: INTERNAL MEDICINE | Facility: CLINIC | Age: 82
End: 2017-03-03

## 2017-03-03 NOTE — TELEPHONE ENCOUNTER
Reason for Call:  Request for results:    Name of test or procedure: lab tests ordered by Leah Ventura according to patient    Date of test of procedure: patient is not sure but thinks they were done in January 2017    Location of the test or procedure: patient not sure    OK to leave the result message on voice mail or with a family member? YES    Phone number Patient can be reached at:  Cell number on file:    Telephone Information:   Mobile [phone]       Additional comments: any    Call taken on 3/3/2017 at 1:58 PM by Jolly Monet

## 2017-03-03 NOTE — TELEPHONE ENCOUNTER
Pt calls, she received results through [facility] and asking what needed to be done for them. Pt states that Creatinine, glucose, CO2, Nitrogen. Informed that recent labs from [facility] on 1/27/17 show Creatinine, glucose and CO2 were normal; not sure what nitrogen level she is referencing, but BUN was normal. Pt verbalizes understanding.

## 2017-03-14 ENCOUNTER — TELEPHONE (OUTPATIENT)
Dept: INTERNAL MEDICINE | Facility: CLINIC | Age: 82
End: 2017-03-14

## 2017-03-14 DIAGNOSIS — J20.9 ACUTE BRONCHITIS, UNSPECIFIED ORGANISM: Primary | ICD-10-CM

## 2017-03-14 RX ORDER — AZITHROMYCIN 250 MG/1
TABLET, FILM COATED ORAL
Qty: 6 TABLET | Refills: 0 | Status: SHIPPED | OUTPATIENT
Start: 2017-03-14 | End: 2017-03-17

## 2017-03-14 NOTE — TELEPHONE ENCOUNTER
Contacted pt. Symptoms started yesterday with coughing, cough is productive with yellow phlegm and has a sore throat. No fevers or chills. Pt not able to come into the clinic - does not have a ride. Sent to covering provider to review.

## 2017-03-14 NOTE — TELEPHONE ENCOUNTER
Reason for Call:  Medication or medication refill:    Do you use a [facility]?  Name of the pharmacy and phone number for the current request:  [facility] in [place]    Name of the medication requested: something for her cold    Other request: patient states that shes coughing a lot when she lays down and is coughing up yellow phlegm, has no way of getting to the clinic to be seen. States that provider has called in meds before.    Can we leave a detailed message on this number? YES    Phone number patient can be reached at: Home number on file [phone] (home)    Best Time: any    Call taken on 3/14/2017 at 10:04 AM by Cherise Yap

## 2017-03-17 ENCOUNTER — APPOINTMENT (OUTPATIENT)
Dept: GENERAL RADIOLOGY | Facility: CLINIC | Age: 82
DRG: 189 | End: 2017-03-17
Attending: EMERGENCY MEDICINE
Payer: MEDICARE

## 2017-03-17 ENCOUNTER — APPOINTMENT (OUTPATIENT)
Dept: CARDIOLOGY | Facility: CLINIC | Age: 82
DRG: 189 | End: 2017-03-17
Attending: HOSPITALIST
Payer: MEDICARE

## 2017-03-17 ENCOUNTER — HOSPITAL ENCOUNTER (INPATIENT)
Facility: CLINIC | Age: 82
LOS: 10 days | Discharge: HOME | DRG: 189 | End: 2017-03-27
Attending: EMERGENCY MEDICINE | Admitting: HOSPITALIST
Payer: MEDICARE

## 2017-03-17 DIAGNOSIS — C91.10 CLL (CHRONIC LYMPHOCYTIC LEUKEMIA) (H): ICD-10-CM

## 2017-03-17 DIAGNOSIS — J44.1 COPD EXACERBATION (H): ICD-10-CM

## 2017-03-17 DIAGNOSIS — R09.02 HYPOXIA: ICD-10-CM

## 2017-03-17 DIAGNOSIS — E87.29 HYPERCAPNIC ACIDOSIS: ICD-10-CM

## 2017-03-17 LAB
ALBUMIN SERPL-MCNC: 3.6 G/DL (ref 3.4–5)
ALP SERPL-CCNC: 151 U/L (ref 40–150)
ALT SERPL W P-5'-P-CCNC: 20 U/L (ref 0–50)
ANION GAP SERPL CALCULATED.3IONS-SCNC: 2 MMOL/L (ref 3–14)
AST SERPL W P-5'-P-CCNC: 33 U/L (ref 0–45)
BASE EXCESS BLDV CALC-SCNC: 2.7 MMOL/L
BASE EXCESS BLDV CALC-SCNC: 4.6 MMOL/L
BILIRUB SERPL-MCNC: 0.5 MG/DL (ref 0.2–1.3)
BUN SERPL-MCNC: 12 MG/DL (ref 7–30)
CALCIUM SERPL-MCNC: 8.2 MG/DL (ref 8.5–10.1)
CHLORIDE SERPL-SCNC: 100 MMOL/L (ref 94–109)
CO2 SERPL-SCNC: 34 MMOL/L (ref 20–32)
CREAT SERPL-MCNC: 1.01 MG/DL (ref 0.52–1.04)
FLUAV+FLUBV AG SPEC QL: NEGATIVE
FLUAV+FLUBV AG SPEC QL: NORMAL
GFR SERPL CREATININE-BSD FRML MDRD: 52 ML/MIN/1.7M2
GLUCOSE BLDC GLUCOMTR-MCNC: 140 MG/DL (ref 70–99)
GLUCOSE SERPL-MCNC: 148 MG/DL (ref 70–99)
HCO3 BLDV-SCNC: 30 MMOL/L (ref 21–28)
HCO3 BLDV-SCNC: 30 MMOL/L (ref 21–28)
INTERPRETATION ECG - MUSE: NORMAL
LACTATE BLD-SCNC: 0.7 MMOL/L (ref 0.7–2.1)
LACTATE BLD-SCNC: 2.4 MMOL/L (ref 0.7–2.1)
MRSA DNA SPEC QL NAA+PROBE: NORMAL
NT-PROBNP SERPL-MCNC: 995 PG/ML (ref 0–1800)
O2/TOTAL GAS SETTING VFR VENT: 2 %
O2/TOTAL GAS SETTING VFR VENT: ABNORMAL %
OXYHGB MFR BLDV: 90 %
PCO2 BLDV: 53 MM HG (ref 40–50)
PCO2 BLDV: 66 MM HG (ref 40–50)
PH BLDV: 7.27 PH (ref 7.32–7.43)
PH BLDV: 7.37 PH (ref 7.32–7.43)
PO2 BLDV: 28 MM HG (ref 25–47)
PO2 BLDV: 62 MM HG (ref 25–47)
POTASSIUM SERPL-SCNC: 4.7 MMOL/L (ref 3.4–5.3)
PROT SERPL-MCNC: 6.8 G/DL (ref 6.8–8.8)
SODIUM SERPL-SCNC: 136 MMOL/L (ref 133–144)
SPECIMEN SOURCE: NORMAL
SPECIMEN SOURCE: NORMAL
TROPONIN I SERPL-MCNC: 0.02 UG/L (ref 0–0.04)

## 2017-03-17 PROCEDURE — 36415 COLL VENOUS BLD VENIPUNCTURE: CPT | Performed by: INTERNAL MEDICINE

## 2017-03-17 PROCEDURE — 94640 AIRWAY INHALATION TREATMENT: CPT

## 2017-03-17 PROCEDURE — 93306 TTE W/DOPPLER COMPLETE: CPT | Mod: 26 | Performed by: INTERNAL MEDICINE

## 2017-03-17 PROCEDURE — 99223 1ST HOSP IP/OBS HIGH 75: CPT | Mod: AI | Performed by: INTERNAL MEDICINE

## 2017-03-17 PROCEDURE — 25000128 H RX IP 250 OP 636: Performed by: HOSPITALIST

## 2017-03-17 PROCEDURE — 00000146 ZZHCL STATISTIC GLUCOSE BY METER IP

## 2017-03-17 PROCEDURE — 40000275 ZZH STATISTIC RCP TIME EA 10 MIN

## 2017-03-17 PROCEDURE — 36415 COLL VENOUS BLD VENIPUNCTURE: CPT | Performed by: EMERGENCY MEDICINE

## 2017-03-17 PROCEDURE — 87640 STAPH A DNA AMP PROBE: CPT | Performed by: HOSPITALIST

## 2017-03-17 PROCEDURE — 82803 BLOOD GASES ANY COMBINATION: CPT | Performed by: EMERGENCY MEDICINE

## 2017-03-17 PROCEDURE — 87804 INFLUENZA ASSAY W/OPTIC: CPT | Performed by: EMERGENCY MEDICINE

## 2017-03-17 PROCEDURE — 71010 XR CHEST PORT 1 VW: CPT

## 2017-03-17 PROCEDURE — 83605 ASSAY OF LACTIC ACID: CPT | Performed by: EMERGENCY MEDICINE

## 2017-03-17 PROCEDURE — 25000125 ZZHC RX 250: Performed by: HOSPITALIST

## 2017-03-17 PROCEDURE — 40000274 ZZH STATISTIC RCP CONSULT EA 30 MIN

## 2017-03-17 PROCEDURE — A9270 NON-COVERED ITEM OR SERVICE: HCPCS | Mod: GY | Performed by: INTERNAL MEDICINE

## 2017-03-17 PROCEDURE — 25000125 ZZHC RX 250: Performed by: EMERGENCY MEDICINE

## 2017-03-17 PROCEDURE — 94640 AIRWAY INHALATION TREATMENT: CPT | Mod: 76

## 2017-03-17 PROCEDURE — 94660 CPAP INITIATION&MGMT: CPT

## 2017-03-17 PROCEDURE — 25000128 H RX IP 250 OP 636: Performed by: EMERGENCY MEDICINE

## 2017-03-17 PROCEDURE — 83605 ASSAY OF LACTIC ACID: CPT | Performed by: HOSPITALIST

## 2017-03-17 PROCEDURE — 82805 BLOOD GASES W/O2 SATURATION: CPT | Performed by: EMERGENCY MEDICINE

## 2017-03-17 PROCEDURE — 96375 TX/PRO/DX INJ NEW DRUG ADDON: CPT

## 2017-03-17 PROCEDURE — 85025 COMPLETE CBC W/AUTO DIFF WBC: CPT | Performed by: EMERGENCY MEDICINE

## 2017-03-17 PROCEDURE — A9270 NON-COVERED ITEM OR SERVICE: HCPCS | Mod: GY | Performed by: HOSPITALIST

## 2017-03-17 PROCEDURE — 96365 THER/PROPH/DIAG IV INF INIT: CPT

## 2017-03-17 PROCEDURE — 83605 ASSAY OF LACTIC ACID: CPT | Performed by: INTERNAL MEDICINE

## 2017-03-17 PROCEDURE — 87641 MR-STAPH DNA AMP PROBE: CPT | Performed by: HOSPITALIST

## 2017-03-17 PROCEDURE — 25000128 H RX IP 250 OP 636: Performed by: INTERNAL MEDICINE

## 2017-03-17 PROCEDURE — 99207 ZZC APP CREDIT; MD BILLING SHARED VISIT: CPT | Performed by: INTERNAL MEDICINE

## 2017-03-17 PROCEDURE — 99285 EMERGENCY DEPT VISIT HI MDM: CPT | Mod: 25

## 2017-03-17 PROCEDURE — 83880 ASSAY OF NATRIURETIC PEPTIDE: CPT | Performed by: EMERGENCY MEDICINE

## 2017-03-17 PROCEDURE — 36415 COLL VENOUS BLD VENIPUNCTURE: CPT | Performed by: HOSPITALIST

## 2017-03-17 PROCEDURE — 25000132 ZZH RX MED GY IP 250 OP 250 PS 637: Mod: GY | Performed by: INTERNAL MEDICINE

## 2017-03-17 PROCEDURE — 80053 COMPREHEN METABOLIC PANEL: CPT | Performed by: EMERGENCY MEDICINE

## 2017-03-17 PROCEDURE — 25000132 ZZH RX MED GY IP 250 OP 250 PS 637: Mod: GY | Performed by: HOSPITALIST

## 2017-03-17 PROCEDURE — 84484 ASSAY OF TROPONIN QUANT: CPT | Performed by: EMERGENCY MEDICINE

## 2017-03-17 PROCEDURE — 93306 TTE W/DOPPLER COMPLETE: CPT

## 2017-03-17 PROCEDURE — 12000000 ZZH R&B MED SURG/OB

## 2017-03-17 PROCEDURE — 93005 ELECTROCARDIOGRAM TRACING: CPT

## 2017-03-17 RX ORDER — FUROSEMIDE 20 MG
20 TABLET ORAL DAILY
Status: DISCONTINUED | OUTPATIENT
Start: 2017-03-17 | End: 2017-03-27 | Stop reason: HOSPADM

## 2017-03-17 RX ORDER — AZITHROMYCIN 250 MG/1
250 TABLET, FILM COATED ORAL DAILY
Status: COMPLETED | OUTPATIENT
Start: 2017-03-17 | End: 2017-03-18

## 2017-03-17 RX ORDER — NALOXONE HYDROCHLORIDE 0.4 MG/ML
.1-.4 INJECTION, SOLUTION INTRAMUSCULAR; INTRAVENOUS; SUBCUTANEOUS
Status: DISCONTINUED | OUTPATIENT
Start: 2017-03-17 | End: 2017-03-27 | Stop reason: HOSPADM

## 2017-03-17 RX ORDER — ASCORBIC ACID 500 MG
500 TABLET ORAL DAILY
COMMUNITY
End: 2017-10-23

## 2017-03-17 RX ORDER — METOPROLOL SUCCINATE 25 MG/1
25 TABLET, EXTENDED RELEASE ORAL DAILY
Status: DISCONTINUED | OUTPATIENT
Start: 2017-03-17 | End: 2017-03-27 | Stop reason: HOSPADM

## 2017-03-17 RX ORDER — LEVOTHYROXINE SODIUM 75 UG/1
75 TABLET ORAL DAILY
Status: DISCONTINUED | OUTPATIENT
Start: 2017-03-17 | End: 2017-03-27 | Stop reason: HOSPADM

## 2017-03-17 RX ORDER — ONDANSETRON 2 MG/ML
4 INJECTION INTRAMUSCULAR; INTRAVENOUS EVERY 6 HOURS PRN
Status: DISCONTINUED | OUTPATIENT
Start: 2017-03-17 | End: 2017-03-27 | Stop reason: HOSPADM

## 2017-03-17 RX ORDER — PROCHLORPERAZINE MALEATE 5 MG
5 TABLET ORAL EVERY 6 HOURS PRN
Status: DISCONTINUED | OUTPATIENT
Start: 2017-03-17 | End: 2017-03-27 | Stop reason: HOSPADM

## 2017-03-17 RX ORDER — DEXTROMETHORPHAN POLISTIREX 30 MG/5ML
60 SUSPENSION ORAL 2 TIMES DAILY
COMMUNITY
End: 2017-04-03

## 2017-03-17 RX ORDER — CALCIUM CARBONATE 500(1250)
500 TABLET ORAL DAILY
Status: ON HOLD | COMMUNITY
End: 2018-02-03

## 2017-03-17 RX ORDER — OXYCODONE HYDROCHLORIDE 5 MG/1
5-10 TABLET ORAL
Status: DISCONTINUED | OUTPATIENT
Start: 2017-03-17 | End: 2017-03-27 | Stop reason: HOSPADM

## 2017-03-17 RX ORDER — LORAZEPAM 2 MG/ML
0.25 INJECTION INTRAMUSCULAR ONCE
Status: COMPLETED | OUTPATIENT
Start: 2017-03-17 | End: 2017-03-17

## 2017-03-17 RX ORDER — IPRATROPIUM BROMIDE AND ALBUTEROL SULFATE 2.5; .5 MG/3ML; MG/3ML
3 SOLUTION RESPIRATORY (INHALATION)
Status: DISCONTINUED | OUTPATIENT
Start: 2017-03-17 | End: 2017-03-27 | Stop reason: HOSPADM

## 2017-03-17 RX ORDER — IPRATROPIUM BROMIDE AND ALBUTEROL SULFATE 2.5; .5 MG/3ML; MG/3ML
6 SOLUTION RESPIRATORY (INHALATION) ONCE
Status: COMPLETED | OUTPATIENT
Start: 2017-03-17 | End: 2017-03-17

## 2017-03-17 RX ORDER — LIDOCAINE 40 MG/G
CREAM TOPICAL
Status: DISCONTINUED | OUTPATIENT
Start: 2017-03-17 | End: 2017-03-17

## 2017-03-17 RX ORDER — ATORVASTATIN CALCIUM 20 MG/1
20 TABLET, FILM COATED ORAL DAILY
Status: DISCONTINUED | OUTPATIENT
Start: 2017-03-17 | End: 2017-03-27 | Stop reason: HOSPADM

## 2017-03-17 RX ORDER — TRAZODONE HYDROCHLORIDE 50 MG/1
50 TABLET, FILM COATED ORAL
Status: DISCONTINUED | OUTPATIENT
Start: 2017-03-17 | End: 2017-03-27 | Stop reason: HOSPADM

## 2017-03-17 RX ORDER — GABAPENTIN 300 MG/1
300 CAPSULE ORAL 3 TIMES DAILY
Status: DISCONTINUED | OUTPATIENT
Start: 2017-03-17 | End: 2017-03-17

## 2017-03-17 RX ORDER — BENZONATATE 100 MG/1
100 CAPSULE ORAL 3 TIMES DAILY PRN
Status: DISCONTINUED | OUTPATIENT
Start: 2017-03-17 | End: 2017-03-27 | Stop reason: HOSPADM

## 2017-03-17 RX ORDER — METHYLPREDNISOLONE SODIUM SUCCINATE 125 MG/2ML
125 INJECTION, POWDER, LYOPHILIZED, FOR SOLUTION INTRAMUSCULAR; INTRAVENOUS ONCE
Status: COMPLETED | OUTPATIENT
Start: 2017-03-17 | End: 2017-03-17

## 2017-03-17 RX ORDER — ZOLPIDEM TARTRATE 5 MG/1
5 TABLET ORAL
Status: DISCONTINUED | OUTPATIENT
Start: 2017-03-17 | End: 2017-03-27 | Stop reason: HOSPADM

## 2017-03-17 RX ORDER — POLYETHYLENE GLYCOL 3350 17 G/17G
17 POWDER, FOR SOLUTION ORAL DAILY PRN
Status: DISCONTINUED | OUTPATIENT
Start: 2017-03-17 | End: 2017-03-27 | Stop reason: HOSPADM

## 2017-03-17 RX ORDER — ONDANSETRON 4 MG/1
4 TABLET, ORALLY DISINTEGRATING ORAL EVERY 6 HOURS PRN
Status: DISCONTINUED | OUTPATIENT
Start: 2017-03-17 | End: 2017-03-27 | Stop reason: HOSPADM

## 2017-03-17 RX ORDER — GUAIFENESIN 600 MG/1
600 TABLET, EXTENDED RELEASE ORAL 2 TIMES DAILY
Status: DISCONTINUED | OUTPATIENT
Start: 2017-03-17 | End: 2017-03-27 | Stop reason: HOSPADM

## 2017-03-17 RX ORDER — BISACODYL 10 MG
10 SUPPOSITORY, RECTAL RECTAL DAILY PRN
Status: DISCONTINUED | OUTPATIENT
Start: 2017-03-17 | End: 2017-03-27 | Stop reason: HOSPADM

## 2017-03-17 RX ORDER — METHYLPREDNISOLONE SODIUM SUCCINATE 40 MG/ML
40 INJECTION, POWDER, LYOPHILIZED, FOR SOLUTION INTRAMUSCULAR; INTRAVENOUS EVERY 6 HOURS
Status: DISCONTINUED | OUTPATIENT
Start: 2017-03-17 | End: 2017-03-20

## 2017-03-17 RX ORDER — DIPHENHYDRAMINE HCL 25 MG
25 CAPSULE ORAL AT BEDTIME
Status: DISCONTINUED | OUTPATIENT
Start: 2017-03-17 | End: 2017-03-27 | Stop reason: HOSPADM

## 2017-03-17 RX ORDER — AMOXICILLIN 250 MG
1-2 CAPSULE ORAL 2 TIMES DAILY PRN
Status: DISCONTINUED | OUTPATIENT
Start: 2017-03-17 | End: 2017-03-27 | Stop reason: HOSPADM

## 2017-03-17 RX ORDER — PROCHLORPERAZINE 25 MG
12.5 SUPPOSITORY, RECTAL RECTAL EVERY 12 HOURS PRN
Status: DISCONTINUED | OUTPATIENT
Start: 2017-03-17 | End: 2017-03-27 | Stop reason: HOSPADM

## 2017-03-17 RX ORDER — GUAIFENESIN 600 MG/1
600 TABLET, EXTENDED RELEASE ORAL 2 TIMES DAILY PRN
COMMUNITY
End: 2017-04-03

## 2017-03-17 RX ADMIN — IPRATROPIUM BROMIDE AND ALBUTEROL SULFATE 6 ML: .5; 3 SOLUTION RESPIRATORY (INHALATION) at 05:29

## 2017-03-17 RX ADMIN — METHYLPREDNISOLONE SODIUM SUCCINATE 40 MG: 40 INJECTION, POWDER, FOR SOLUTION INTRAMUSCULAR; INTRAVENOUS at 23:50

## 2017-03-17 RX ADMIN — TRAZODONE HYDROCHLORIDE 50 MG: 50 TABLET ORAL at 21:27

## 2017-03-17 RX ADMIN — GUAIFENESIN 600 MG: 600 TABLET, EXTENDED RELEASE ORAL at 20:29

## 2017-03-17 RX ADMIN — SODIUM CHLORIDE 500 ML: 9 INJECTION, SOLUTION INTRAVENOUS at 22:38

## 2017-03-17 RX ADMIN — IPRATROPIUM BROMIDE AND ALBUTEROL SULFATE 3 ML: .5; 3 SOLUTION RESPIRATORY (INHALATION) at 21:42

## 2017-03-17 RX ADMIN — IPRATROPIUM BROMIDE AND ALBUTEROL SULFATE 3 ML: .5; 3 SOLUTION RESPIRATORY (INHALATION) at 11:23

## 2017-03-17 RX ADMIN — METHYLPREDNISOLONE SODIUM SUCCINATE 40 MG: 40 INJECTION, POWDER, FOR SOLUTION INTRAMUSCULAR; INTRAVENOUS at 11:33

## 2017-03-17 RX ADMIN — GUAIFENESIN 600 MG: 600 TABLET, EXTENDED RELEASE ORAL at 11:33

## 2017-03-17 RX ADMIN — ATORVASTATIN CALCIUM 20 MG: 20 TABLET, FILM COATED ORAL at 11:32

## 2017-03-17 RX ADMIN — Medication 2 G: at 05:50

## 2017-03-17 RX ADMIN — METHYLPREDNISOLONE SODIUM SUCCINATE 125 MG: 125 INJECTION, POWDER, FOR SOLUTION INTRAMUSCULAR; INTRAVENOUS at 05:29

## 2017-03-17 RX ADMIN — DIPHENHYDRAMINE HYDROCHLORIDE 25 MG: 25 CAPSULE ORAL at 21:30

## 2017-03-17 RX ADMIN — IPRATROPIUM BROMIDE AND ALBUTEROL SULFATE 3 ML: .5; 3 SOLUTION RESPIRATORY (INHALATION) at 15:51

## 2017-03-17 RX ADMIN — LEVOTHYROXINE SODIUM 75 MCG: 75 TABLET ORAL at 14:54

## 2017-03-17 RX ADMIN — METHYLPREDNISOLONE SODIUM SUCCINATE 40 MG: 40 INJECTION, POWDER, FOR SOLUTION INTRAMUSCULAR; INTRAVENOUS at 17:56

## 2017-03-17 RX ADMIN — ENOXAPARIN SODIUM 40 MG: 40 INJECTION SUBCUTANEOUS at 11:30

## 2017-03-17 RX ADMIN — METOPROLOL SUCCINATE 25 MG: 25 TABLET, EXTENDED RELEASE ORAL at 11:32

## 2017-03-17 RX ADMIN — LORAZEPAM 0.25 MG: 2 INJECTION, SOLUTION INTRAMUSCULAR; INTRAVENOUS at 05:29

## 2017-03-17 RX ADMIN — AZITHROMYCIN 250 MG: 250 TABLET, FILM COATED ORAL at 11:32

## 2017-03-17 RX ADMIN — FUROSEMIDE 20 MG: 20 TABLET ORAL at 11:32

## 2017-03-17 ASSESSMENT — PAIN DESCRIPTION - DESCRIPTORS: DESCRIPTORS: ACHING

## 2017-03-17 ASSESSMENT — ENCOUNTER SYMPTOMS
CHILLS: 0
PALPITATIONS: 0
COUGH: 1
FEVER: 0
NAUSEA: 0
VOMITING: 0
SHORTNESS OF BREATH: 1

## 2017-03-17 ASSESSMENT — ACTIVITIES OF DAILY LIVING (ADL)
BATHING: 0-->INDEPENDENT
DRESS: 0-->INDEPENDENT
NUMBER_OF_TIMES_PATIENT_HAS_FALLEN_WITHIN_LAST_SIX_MONTHS: 1
RETIRED_EATING: 0-->INDEPENDENT
RETIRED_COMMUNICATION: 0-->UNDERSTANDS/COMMUNICATES WITHOUT DIFFICULTY
AMBULATION: 0-->INDEPENDENT
TRANSFERRING: 0-->INDEPENDENT
COGNITION: 0 - NO COGNITION ISSUES REPORTED
TOILETING: 0-->INDEPENDENT
FALL_HISTORY_WITHIN_LAST_SIX_MONTHS: YES
SWALLOWING: 0-->SWALLOWS FOODS/LIQUIDS WITHOUT DIFFICULTY

## 2017-03-17 NOTE — PLAN OF CARE
Problem: Individualization  Goal: Patient Individualization  Outcome: Improving  ICU End of Shift Summary.  For vital signs and complete assessments, please see documentation flowsheets.      Pertinent assessments: Diminished Lung sounds not requiring BIPAP.  BENJAMIN.  Tolerating breakfast.  headache improved with coffee.  Photo sensitivity noted.  x2 good naps.  Expresses concern that family is in [place] and not wanting to bother them.    Major Shift Events: Admit.  Tolerating 3L NC.  Transfering to 5th floor.  Plan (Upcoming Events): Transfer to 5th  Discharge/Transfer Needs:      Report given to 5th floor charge nurse

## 2017-03-17 NOTE — PROGRESS NOTES
"[facility]     Date: 03/17/17  Admission Dx: COPD  Pulmonary History: COPD  Home Nebulizer/MDI Use: Advair BID, Albuterol MDI Q6 prn, Albuterol Q6 prn  Home Oxygen: Oxygen prn  Acuity Level (RCAT flow sheet): 3  Aerosol Therapy initiated: Duoneb QID, Duoneb Q2 prn, Breo Ellipta QD      Pulmonary Hygiene initiated: Coughing Techniques      Volume Expansion initiated: Incentive Spirometry      Current Oxygen Requirements: 3 LPM NC  Current SpO2: 92%    Re-evaluation date: 03/20/17     Patient Education: Discussed use of oxygen and respiratory medications.      See \"RT Assessments\" flow sheet for patient assessment scoring and Acuity Level Details.             "

## 2017-03-17 NOTE — H&P
[facility]    Hospitalist History and Physical    Name: Marie Kline    MRN: [id number]  YOB: 1932    Age: 84 year old  Date of Admission:  3/17/2017  Date of Service (when I saw the patient): 03/17/17    Assessment & Plan   Marie Kline is a 84 year old female with PMH significant for COPD, CHF, CAD with Cardiomyopathy and CLL presented to ED with  Progressively shortness of Breath admitted for Acute respiratory failure with hypoxia.    Acute respiratory failure with hypoxia  -- most likely secondary to COPD exacerbation  -- Patient with CLL and is at risk for PE   -- CHF seems stable with no clinical evidence of f;uid overload on exam  -- NO clinical sign of infection, no fever, Normal CXR, cannot infer based on leukocytosis given CLL  -- will check influenza swab pending      COPD Exacerbation  -- significant improvement after steroids, magnesium and BIPAP in ED  -- continue Nebs  -- Methylprednisone q 6h  --Wean BIPAP as able  -- repeat ABG  -- Will complete Azithromycin as started by primary care as outpatient for 2 additional days  -- continue umeclidinium    CHF/ Cardiomyopathy and CAD  -- clinically no signs of volume overload  -- continue PTA lasixs  -- Monitor i/0s  -- daily weight  -- serial troponin  -- cardiac echo last was in 2014  -- on ASA, BB and statins    H/o CLL   -- seems stable  -- monitor CBC  -- will check D Dimer ( has been lower in past)  -- if elevated will get CTA to r/o PE as pt is at risk for hypercoagulability      DVT Prophylaxis: Pneumatic Compression Devices  Platelets lower  Code Status: Full Code    Disposition: Admitted expected > 2 midnight stay    Care plan d/w ICU team    Primary Care Physician   Piper Kiser    Chief Complaint   Worsening Shortness of breath    History is obtained from the patient    History of Present Illness   Marie Kline is a 84 year old female  With PMH sig for Copd, CHF, CAD, CLL presented to ED with c/o worsening  shortness of breath. Patient was seen  By her primary care provider and was started on Zithromax for bronchitis, she was on nebulizers at home with no improvement. She presented to ED lethargic hypoxic with sats in mid 80s. C/o dry cough, no fever or chills, no cp. Clinically improved after steroids, magnesium, nebs and BIPAP in ED. Admitted to ICU for respiratory failure and need for BIPAP    Past Medical History    Past Medical History   Diagnosis Date     Bladder cancer (H)      Cardiomyopathy (H)      CLL (chronic lymphocytic leukemia) (H)      Congestive heart failure (H) 10/24/2014     flash pulm edema ass w/Takotsubo     COPD (chronic obstructive pulmonary disease) (H)      noc O2     Hypothyroid      Myocardial infarction (H) 10/2014     Takotsubo presentation w/mild CAD     Pulmonary edema cardiac cause (H) 10/08/2014     associated w/Takotsubo ACS     Tricuspid valve disorders, 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     TR 2-3+ per echo         Past Surgical History   Past Surgical History   Procedure Laterality Date     Coronary angiography adult order  10/2014     minimal CAD     Cystoscopy, biopsy bladder, combined N/A 2/9/2016     Procedure: COMBINED CYSTOSCOPY, BIOPSY BLADDER;  Surgeon: Kar Nuñez MD;  Location:  OR     Cystoscopy, transurethral resection (tur) tumor bladder, combined N/A 2/9/2016     Procedure: COMBINED CYSTOSCOPY, TRANSURETHRAL RESECTION (TUR) TUMOR BLADDER;  Surgeon: Kar Nuñez MD;  Location:  OR     Esophagoscopy, gastroscopy, duodenoscopy (egd), combined N/A 6/22/2016     Procedure: COMBINED ESOPHAGOSCOPY, GASTROSCOPY, DUODENOSCOPY (EGD);  Surgeon: Freddie Diez MD;  Location:  GI       Prior to Admission Medications   Prior to Admission Medications   Prescriptions Last Dose Informant Patient Reported? Taking?   Ascorbic Acid (VITAMIN C PO)   Yes No   Cholecalciferol (VITAMIN D) 2000 UNITS tablet   No No   Sig: Take 2,000 Units by mouth daily   FERROUS  GLUCONATE PO   Yes No   Sig: Take 324 mg by mouth daily (with breakfast)    Multiple Vitamins-Minerals (MULTIVITAMIN GUMMIES ADULT PO)   Yes No   Sig: Take by mouth daily   Naproxen Sodium (ALEVE PO)   Yes No   Sig: Take by mouth 2 times daily (with meals)   albuterol (2.5 MG/3ML) 0.083% nebulizer solution   No No   Sig: Take 1 vial (2.5 mg) by nebulization every 6 hours as needed for shortness of breath / dyspnea or wheezing   albuterol (PROAIR HFA/PROVENTIL HFA/VENTOLIN HFA) 108 (90 BASE) MCG/ACT Inhaler   No No   Sig: Inhale 2 puffs into the lungs every 6 hours as needed for wheezing   atorvastatin (LIPITOR) 20 MG tablet   No No   Sig: Take 1 tablet (20 mg) by mouth daily   diphenhydrAMINE (BENADRYL) 25 MG capsule   Yes No   Sig: Take 25 mg by mouth At Bedtime    fluticasone-salmeterol (ADVAIR) 250-50 MCG/DOSE diskus inhaler   No No   Sig: Inhale 1 puff into the lungs every 12 hours   furosemide (LASIX) 20 MG tablet   No No   Sig: Take 1 tablet (20 mg) by mouth daily   gabapentin (NEURONTIN) 300 MG capsule   No No   Sig: Take 1 capsule (300 mg) by mouth 3 times daily   levothyroxine (SYNTHROID, LEVOTHROID) 75 MCG tablet   No No   Sig: Take 1 tablet (75 mcg) by mouth daily   metoprolol (TOPROL-XL) 25 MG 24 hr tablet   No No   Sig: Take 1 tablet (25 mg) by mouth daily   traZODone (DESYREL) 50 MG tablet   No No   Sig: Take 1 tablet (50 mg) by mouth nightly as needed for sleep   umeclidinium (INCRUSE ELLIPTA) 62.5 MCG/INH oral inhaler   No No   Sig: Inhale 1 puff (62.5 mcg) into the lungs daily   zolpidem (AMBIEN) 5 MG tablet   No No   Sig: Take 1 tablet (5 mg) by mouth nightly as needed for sleep      Facility-Administered Medications: None     Allergies   Allergies   Allergen Reactions     Contrast Dye Hives       Social History   Social History   Substance Use Topics     Smoking status: Former Smoker     Types: Cigarettes     Quit date: 2/3/1996     Smokeless tobacco: Never Used     Alcohol use No     Social  History     Social History Narrative     Lives alone in an apartment. Not smoking, no alcohol    Family History   Mom with CAD and CVA    Review of Systems   A Comprehensive greater than 10 system review of systems was carried out.  Pertinent positives and negatives are noted above.  Otherwise negative for contributory information.    Physical Exam   Temp: 98.8  F (37.1  C) Temp src: Oral BP: 114/68   Heart Rate: 92 Resp: (!) 32 SpO2: 93 % O2 Device: BiPAP/CPAP    Vital Signs with Ranges  Temp:  [98.8  F (37.1  C)] 98.8  F (37.1  C)  Heart Rate:  [87-92] 92  Resp:  [32] 32  BP: (110-138)/(67-86) 114/68  FiO2 (%):  [40 %] 40 %  SpO2:  [84 %-93 %] 93 %  0 lbs 0 oz    GEN:  Alert, oriented x 3, tachypneic, breathing through BIPAP  HEENT:  Normocephalic/atraumatic, no scleral icterus, no nasal discharge, mouth DRY  CV:  Regular rate and rhythm, + murmur.  S1 + S2 noted, no S3 or S4.  LUNGS:  Bilateral air entry, poor inspiratory effort, prolonged expiration few scattered wheezing  Symmetric chest rise on inhalation noted.  ABD:  Active bowel sounds, soft, non-tender/non-distended.  No rebound/guarding/rigidity.  EXT:  No edema.  No cyanosis.  No joint synovitis noted.  SKIN:  Dry to touch, n.  NEURO:  Symmetric muscle strength, sensation to touch grossly intact.   No new focal deficits appreciated.    Data   Data reviewed today:  I personally reviewed the EKG tracing showing normal sinus with nonspecific stt changes.      Recent Labs  Lab 03/17/17 0515   PHV 7.27*   PO2V 28   PCO2V 66*   HCO3V 30*       Recent Labs  Lab 03/17/17 0515   .8*   HGB 11.0*   HCT 36.5   MCV 95   *       Recent Labs  Lab 03/17/17 0515      POTASSIUM 4.7   CHLORIDE 100   CO2 34*   ANIONGAP 2*   *   BUN 12   CR 1.01   GFRESTIMATED 52*   GFRESTBLACK 63   CARLOS 8.2*     No results for input(s): CULT in the last 168 hours.  No results for input(s): DD in the last 168 hours.    Recent Labs  Lab 03/17/17 0515   AST 33    ALT 20   ALKPHOS 151*   BILITOTAL 0.5     No results for input(s): INR in the last 168 hours.    Recent Labs  Lab 03/17/17  0515   LACT 0.7       Recent Labs  Lab 03/17/17  0515   TROPI 0.021     No results for input(s): COLOR, APPEARANCE, URINEGLC, URINEBILI, URINEKETONE, SG, UBLD, URINEPH, PROTEIN, UROBILINOGEN, NITRITE, LEUKEST, RBCU, WBCU in the last 168 hours.    Recent Labs  Lab 03/17/17  0515   .8*       Recent Results (from the past 24 hour(s))   XR Chest Port 1 View    Narrative    CHEST SINGLE VIEW PORTABLE  3/17/2017 6:05 AM     HISTORY: Shortness of breath.    COMPARISON: 8/5/2016.    FINDINGS: The lungs are clear. Relatively increased lucency within the  mid lungs bilaterally, likely representing emphysematous changes.  Normal-sized cardiac silhouette. Atherosclerotic calcification in the  thoracic aorta.      Impression    IMPRESSION: No evidence of active cardiopulmonary disease.    DOUG DOUGHERTY MD

## 2017-03-17 NOTE — ED NOTES
Bed: ED02  Expected date: 3/17/17  Expected time: 5:10 AM  Means of arrival: Ambulance  Comments:  YOLI 84F

## 2017-03-17 NOTE — PHARMACY-ADMISSION MEDICATION HISTORY
Admission medication history interview status for this patient is complete. See [facility] admission navigator for allergy information, prior to admission medications and immunization status.     Medication history interview source(s):Patient  Medication history resources (including written lists, pill bottles, clinic record):Patient had partial list with her  Primary pharmacy:[facility]    Changes made to PTA medication list:  Added: Zithromax, Tylenol, Mucinex, Delsym, Calcium  Deleted: Incruse Ellipta, Gabapentin  Changed: Vitamin C dose and freq  ,Naproxen to prn    Actions taken by pharmacist (provider contacted, etc):None     Additional medication history information:None    Medication reconciliation/reorder completed by provider prior to medication history? Yes    For patients on insulin therapy: No     Prior to Admission medications    Medication Sig Last Dose Taking? Auth Provider   ascorbic acid 500 MG TABS Take 500 mg by mouth daily 3/16/2017 at Unknown time Yes Unknown, Entered By History   NAPROXEN SODIUM PO Take 220 mg by mouth 2 times daily as needed for moderate pain Past Week at Unknown time Yes Unknown, Entered By History   Azithromycin (ZITHROMAX Z-MEDHAT PO) Take 250 mg by mouth daily Z-medhat started on 3/14/2017 (500 mg on 3/14, then 250 mg daily x 4 days) 3/16/2017 at Unknown time Yes Unknown, Entered By History   Acetaminophen (TYLENOL PO) Take 1,000 mg by mouth every 8 hours as needed for mild pain or fever Past Week at Unknown time Yes Unknown, Entered By History   guaiFENesin (MUCINEX) 600 MG 12 hr tablet Take 600 mg by mouth 2 times daily as needed for congestion Past Week at Unknown time Yes Unknown, Entered By History   dextromethorphan (DELSYM) 30 MG/5ML liquid Take 60 mg by mouth 2 times daily 3/16/2017 at Unknown time Yes Unknown, Entered By History   calcium carbonate (OS-CARLOS 500 MG [employer] [place]) 500 MG tablet Take 500 mg by mouth daily 3/16/2017 at Unknown time Yes Unknown, Entered By History    fluticasone-salmeterol (ADVAIR) 250-50 MCG/DOSE diskus inhaler Inhale 1 puff into the lungs every 12 hours 3/16/2017 at Unknown time Yes Piper Kiser MD   albuterol (PROAIR HFA/PROVENTIL HFA/VENTOLIN HFA) 108 (90 BASE) MCG/ACT Inhaler Inhale 2 puffs into the lungs every 6 hours as needed for wheezing Past Week at Unknown time Yes Piper Kiser MD   atorvastatin (LIPITOR) 20 MG tablet Take 1 tablet (20 mg) by mouth daily 3/16/2017 at Unknown time Yes Piper Kiser MD   Multiple Vitamins-Minerals (MULTIVITAMIN GUMMIES ADULT PO) Take 1 tablet by mouth daily  3/16/2017 at Unknown time Yes Reported, Patient   furosemide (LASIX) 20 MG tablet Take 1 tablet (20 mg) by mouth daily 3/16/2017 at Unknown time Yes Chato Huang MD   metoprolol (TOPROL-XL) 25 MG 24 hr tablet Take 1 tablet (25 mg) by mouth daily 3/16/2017 at Unknown time Yes Piper Kiser MD   traZODone (DESYREL) 50 MG tablet Take 1 tablet (50 mg) by mouth nightly as needed for sleep Past Week at Unknown time Yes Piper Kiser MD   albuterol (2.5 MG/3ML) 0.083% nebulizer solution Take 1 vial (2.5 mg) by nebulization every 6 hours as needed for shortness of breath / dyspnea or wheezing 3/16/2017 at Unknown time Yes Piper Kiser MD   zolpidem (AMBIEN) 5 MG tablet Take 1 tablet (5 mg) by mouth nightly as needed for sleep 3/16/2017 at Unknown time Yes Piper Kiser MD   FERROUS GLUCONATE PO Take 324 mg by mouth daily (with breakfast)  3/16/2017 at Unknown time Yes Reported, Patient   levothyroxine (SYNTHROID, LEVOTHROID) 75 MCG tablet Take 1 tablet (75 mcg) by mouth daily 3/16/2017 at Unknown time Yes Piper Kiser MD   diphenhydrAMINE (BENADRYL) 25 MG capsule Take 25 mg by mouth At Bedtime  3/16/2017 at Unknown time Yes Reported, Patient   Cholecalciferol (VITAMIN D) 2000 UNITS tablet Take 2,000 Units by mouth daily 3/16/2017 at Unknown time Yes Piper Kiser MD

## 2017-03-17 NOTE — IP AVS SNAPSHOT
[place] [id number] [facility]    [address]    [facility] [address]    Phone:  [phone]    Fax:  [phone]                                       After Visit Summary   3/17/2017    Marie Kline    MRN: [id number]           After Visit Summary Signature Page     I have received my discharge instructions, and my questions have been answered. I have discussed any challenges I see with this plan with the nurse or doctor.    ..........................................................................................................................................  Patient/Patient Representative Signature      ..........................................................................................................................................  Patient Representative Print Name and Relationship to Patient    ..................................................               ................................................  Date                                            Time    ..........................................................................................................................................  Reviewed by Signature/Title    ...................................................              ..............................................  Date                                                            Time

## 2017-03-17 NOTE — ED NOTES
Patient comes in by ambulance for eval of SOB. Patient states she has been sick since Monday and started on Zpak Tuesday. Patient was unable to speak in complete sentences initially per EMS. Neb was given enroute.

## 2017-03-17 NOTE — PROGRESS NOTES
HOSPITALIST FU NOTE    The patient was seen and examined, I agree with the history, exam, assessment and plan of Dr Hughes.    1. COPD exacerbation: Doing well, off BiPAP and resting/sleeping. Steroids, nebs, supplemental O2. No need for d-dimer unless clinically worsens. Stable to txf to GMF.    Wenceslao Craig  March 17, 2017

## 2017-03-17 NOTE — ED PROVIDER NOTES
History     Chief Complaint:  Shortness of breath     HPI   Marie Kline is a 84 year old female who presents with shortness of breath. The patient has a previously pertinent history of CHF, Bladder cancer, COPD, Cardiomyopathy, Chronic lymphocytic, and MI. She recently became ill with a productive cough and sore throat and called her office on Monday (3/13) and subsequently had a prescription for Zithromax called in to her pharmacy, she started this the next day empirically for bronchitis/pneumonia. Since that time, she has had progressively worsening shortness of breath. She was significantly short of breath tonight and EMS was subsequently called. On EMS arrival, she had oxygen saturations in the 80s and was started on supplemental oxygen and given a Duoneb. This improved her breathing and was able to go from 3-word sentences to full sentences, but still tachypneic. On arrival here, she reports some improvement in her breathing. She denies any known fevers. She has not had any leg swelling, fevers, chills,     Allergies:  Contrast Dye      Medications:    Advair inhaler  Albuterol inhaler  Lipitor  Toprol XL  Lasix  Desyrel  Albuterol  Neurontin  Ambien  Ferrous gluconate  Levothyroxine     Past Medical History:    CKD stage III  Osteoporosis  Bladder cancer  Splenomegaly  Lumbar DDD  LESLY  Hypothyroidism  COPD  Hyperlipidemia  Pneumonia   Nonischemic cardiomyopathy  CHF  CLL  Tricuspid valve disorders  MI    Past Surgical History:    Coronary angiography  Cystoscopy  Transurethral resection tumor bladder   EGD     Family History:    CVD  Cancer     Social History:  Smoking status: Former smoker  Alcohol use: No   Marital Status:        Review of Systems   Constitutional: Negative for chills and fever.   Respiratory: Positive for cough and shortness of breath.    Cardiovascular: Negative for chest pain, palpitations and leg swelling.   Gastrointestinal: Negative for nausea and vomiting.   All other  systems reviewed and are negative.      Physical Exam     Patient Vitals for the past 24 hrs:   BP Temp Temp src Heart Rate Resp SpO2   03/17/17 0600 114/68 - - 92 - 93 %   03/17/17 0553 - - - - - 92 %   03/17/17 0540 123/67 - - 87 - 92 %   03/17/17 0520 110/86 - - - - (!) 88 %   03/17/17 0517 138/70 98.8  F (37.1  C) Oral 90 (!) 32 (!) 84 %   03/17/17 0514 138/70 - - - - (!) 84 %      Physical Exam  Nursing note and vitals reviewed.  Constitutional: Cooperative. Increased work of breathing  HENT:   Mouth/Throat: Mucous membranes are dry.   Eyes: Pupils are equal, round, and reactive to light.   Cardiovascular: Normal rate, regular rhythm and normal heart sounds.  No murmur.  Pulmonary/Chest: Increased work of breathing. Diffuse expiratory wheezes. Using accessory muscles. Tachypenic  Abdominal: Soft. Normal appearance and bowel sounds are normal. No distension. There is no tenderness. There is no rigidity and no guarding.   Musculoskeletal: No LE edema  Neurological: Alert. Oriented x4  Skin: Skin is warm and dry. No rash noted.   Psychiatric: Normal mood and affect.      Emergency Department Course     ECG (5:18:09):  Rate 87 bpm. MO interval 192. QRS duration 94. QT/QTc 384/462. P-R-T axes 73 66 66. Normal sinus rhythm. Junctional ST depression, probably normal. Interpreted at 0518 by Wenceslao Chung MD.     Imaging:  All imaging results communicated with the patient who voiced understanding of the findings.    X-ray Chest, Portable:  No evidence of active cardiopulmonary disease.  Result per radiology.       Laboratory:  0515 - Troponin: 0.021  BNP: 995    CBC: .8 (HH), HGB 11.0 (L),  (L)    CMP: CO2 34 (H), Anion gap 2 (L), Glucose 148 (H), GFR 52 (L), Calcium 8.2 (L), Alkphos 151 (H), o/w WNL (Creatinine 1.01)     Lactic Acid: 0.7    0515 - Venous Blood Gas  Lab 03/17/17 0515   PHV 7.27*   PCO2V 66*   PO2V 28   HCO3V 30*   JUAN RAMON 2.7   O2PER 2      Repeat Venous Blood Gas: Pending    Influenza Swab:  Pending     Interventions:  0529 - Solu-medrol 125 mg IV  0529 - DuoNeb, 2.5mg/3 mL, Inhalation solution, Nebulizer   0529 - Ativan 0.25 mg IV  0550 - Magnesium sulfate 2 g  IV    Emergency Department Course:  The patient arrived in the emergency department via EMS.   Past medical records, nursing notes, and vitals reviewed.  0515: I performed an exam of the patient and obtained history, as documented above.    IV inserted and blood drawn. The patient was placed on nasal cannula and continuous cardiac monitoring and pulse oximetry.     The patient was subsequently placed on BIPAP.     0608: Rechecked patient. Resting comfortably on the BIPAP with improvement in her breathing.    Findings and plan explained to the Patient who consents to admission.     0626: Discussed the patient with Dr. Hughes, who will admit the patient to an ICU bed for further monitoring, evaluation, and treatment.      Impression & Plan      Medical Decision Making:  Marie Kline is a 84 year old female with COPD and CLL who presents in acute respiratory distress. It sounds like she has had worsening shortness of breath and breathing issues over the last 3 to 4 days. She is afebrile here. She feels improved after the nebulizer treatment by EMS but was objectively hypoxic on arrival and struggling to breathe. She was immediately put on BIPAP with the other above interventions, and is now resting comfortably. Her labs do show her chronic lymphocytic leukemia with a white blood cell count over 200. She has a respiratory acidosis consistent with acute hypercapnia. At this time, with improvement on BIPAP, she is stable for admission to the ICU. Initial troponin, EKG are unremarkable and she will be admitted in stable condition. Without infiltrative process seen on the chest X-ray, I would hold on presumptive antibiotics.     Critical Care time:  was 30 minutes for this patient excluding procedures.    Diagnosis:    ICD-10-CM    1. COPD exacerbation  (H) J44.1    2. Hypercapnic acidosis E87.2    3. CLL (chronic lymphocytic leukemia) (H) C91.10        Joselito Richards  3/17/2017   [facility] EMERGENCY DEPARTMENT  I, Joselito Richards, am serving as a scribe at 5:15 AM on 3/17/2017 to document services personally performed by Wenceslao Chung MD based on my observations and the provider's statements to me.       Wenceslao Chung MD  03/17/17 0703

## 2017-03-17 NOTE — LETTER
Transition Communication Hand-off for Care Transitions to Next Level of Care Provider    Name: Marie Kline  MRN #: [id number]  Primary Care Provider: Piper Kiser  Primary Care MD Name: Dr. Kiser  Primary Clinic: [facility] [address] [facility] [address]  Primary Care Clinic Name: [facility]  Reason for Hospitalization:  CLL (chronic lymphocytic leukemia) (H) [C91.10]  Hypercapnic acidosis [E87.2]  Hypoxia [R09.02]  COPD exacerbation (H) [J44.1]  Admit Date/Time: 3/17/2017  5:12 AM  Discharge Date: 3/27/17  Payor Source: Payor: MEDICARE / Plan: MEDICARE / Product Type: Medicare /     Readmission Assessment Measure (CARLOS MANUEL) Risk Score/category: AVERAGE    Plan of Care Goals/Milestone Events:   Patient Concerns: NEW CLL         Reason for Communication Hand-off Referral: Admission diagnoses: COPD  Fragility  Multiple providers/specialties  Other NEW CLL    Discharge Plan:  Discharge Plan:       Most Recent Value    Disposition Comments DC to [facility]           Concern for non-adherence with plan of care:   NO  Discharge Needs Assessment:  Needs       Most Recent Value    Equipment Currently Used at Home grab bar [pt has access to shower chair as well]    Transportation Available car, family or friend will provide          Already enrolled in Tele-monitoring program and name of program:  NA  Follow-up specialty is recommended: Yes    Follow-up plan:  Future Appointments  Date Time Provider Department Center   4/3/2017 1:00 PM Leah Ventura NP [facility]   5/9/2017 10:20 AM Kar Nuñez MD [facility] PH[facility]   5/9/2017 11:00 AM RH INFUSION CHAIR 11 [facility] [place] [facility]       Any outstanding tests or procedures:    Procedures     Future Labs/Procedures    Oxygen Adult     Comments:    Renew Home Oxygen Order  Renew previous prescription.  Expected treatment length is indefinite (99 months).    Attending Provider: Sha Ponce  Physician signature: See electronic signature  associated with these discharge orders  Date of Order: March 27, 2017               Follow-up and recommended labs and tests        Follow up with Dr. Ivory later this week.   Follow up with PCP in 1-2 weeks.                [place] Recommendations:  Frail, copd exac, new diagnosis CLL-will f/u with Dr Ivory [place] onc  Zulay ANDINOJohnathan Wallacerichard  [phone]  Sent via [employer] mgmt to Dr Kiser's RN CTS    AVS/Discharge Summary is the source of truth; this is a helpful guide for improved communication of patient story

## 2017-03-17 NOTE — IP AVS SNAPSHOT
MRN:[id number]                      After Visit Summary   3/17/2017    Marie Kline    MRN: [id number]           Thank you!     Thank you for choosing [facility] for your care. Our goal is always to provide you with excellent care. Hearing back from our patients is one way we can continue to improve our services. Please take a few minutes to complete the written survey that you may receive in the mail after you visit. If you would like to speak to someone directly about your visit please contact Patient Relations at [phone]. Thank you!          Patient Information     Date Of Birth          4/28/1932        About your hospital stay     You were admitted on:  March 17, 2017 You last received care in the:  [address] [facility]    You were discharged on:  March 27, 2017        Reason for your hospital stay       COPD exacerbation and severe leukocytosis due to CLL (initiated chemotherapy)                  Who to Call     For medical emergencies, please call 911.  For non-urgent questions about your medical care, please call your primary care provider or clinic, [phone]          Attending Provider     Provider Specialty    Wenceslao Chung MD Emergency Medicine    Wenceslao Craig DO Internal Medicine       Primary Care Provider Office Phone # Fax #    Piper Catracho Kiser [phone]       [facility] [address] [place] [address]        After Care Instructions     Diet       Follow this diet upon discharge: Regular            Oxygen Adult       Renew Home Oxygen Order  Renew previous prescription.  Expected treatment length is indefinite (99 months).    Attending Provider: Sha Ponce  Physician signature: See electronic signature associated with these discharge orders  Date of Order: March 27, 2017                  Follow-up Appointments     Follow-up and recommended labs and tests        Follow up with Dr. Cachorro lopez  "this week.  Follow up with PCP in 1-2 weeks.                  Your next 10 appointments already scheduled     Mar 28, 2017 11:00 AM CDT   [facility] with Min [facility]no MD   [facility]    [address]  [facility] [id number] [phone]           Bring a current list of meds and any records pertaining to this visit.  For Physicals, please bring immunization records and any forms needing to be filled out.  Please arrive 10 minutes early to complete paperwork.            May 09, 2017 10:20 AM CDT   Cystoscopy with Kar Nuñez MD, UB CYF   [facility] [place] ([facility] [place])    [address]  [address]  [facility] [id number] [phone]            May 09, 2017 11:00 AM CDT   Level 1 with RH INFUSION CHAIR 11   [facility] ([facility]    [facility]  [address]  [facility] [id number] [phone]              Further instructions from your care team       Your Follow-up appointment is on Tuesday March 28, at 11:00 am with Dr. Moreno at the [facility] in [place].     Pending Results     No orders found from 3/15/2017 to 3/18/2017.            Statement of Approval     Ordered          03/27/17 1221  I have reviewed and agree with all the recommendations and orders detailed in this document.  EFFECTIVE NOW     Approved and electronically signed by:  Sha Ponce MD             Admission Information     Date & Time Provider Department Dept. Phone    3/17/2017 Wenceslao Craig,  [place] [address] Medical Surgical [phone]      Your Vitals Were     Blood Pressure Pulse Temperature Respirations Height Weight    136/56 (BP Location: Left arm) 85 98.4  F (36.9  C) (Oral) 18 1.473 m (4' 9.99\") 53 kg (116 lb 14.4 oz)    Pulse Oximetry BMI (Body Mass Index)                95% " 24.44 kg/m2          [employer] Information     [employer] gives you secure access to your electronic health record. If you see a primary care provider, you can also send messages to your care team and make appointments. If you have questions, please call your primary care clinic.  If you do not have a primary care provider, please call [phone] and they will assist you.        Care EveryWhere ID     This is your Care EveryWhere ID. This could be used by other organizations to access your [place] medical records  [id number]           Review of your medicines      START taking        Dose / Directions    allopurinol 100 MG tablet   Commonly known as:  ZYLOPRIM   Used for:  CLL (chronic lymphocytic leukemia) (H)        Dose:  200 mg   Take 2 tablets (200 mg) by mouth 2 times daily for 10 days   Quantity:  40 tablet   Refills:  0       predniSONE 10 MG tablet   Commonly known as:  DELTASONE   Used for:  COPD exacerbation (H)        By mouth, take 30 mg (3 tabs) daily for 3 days, then 20 mg (2 tabs) daily for 3 days, then 10 mg (1 tab) daily for 3 days, then stop   Quantity:  18 tablet   Refills:  0         CONTINUE these medicines which have NOT CHANGED        Dose / Directions    * albuterol (2.5 MG/3ML) 0.083% neb solution   Used for:  COPD exacerbation (H)        Dose:  1 vial   Take 1 vial (2.5 mg) by nebulization every 6 hours as needed for shortness of breath / dyspnea or wheezing   Quantity:  360 mL   Refills:  1       * albuterol 108 (90 BASE) MCG/ACT Inhaler   Commonly known as:  PROAIR HFA/PROVENTIL HFA/VENTOLIN HFA   Used for:  Intermittent asthma without complication        Dose:  2 puff   Inhale 2 puffs into the lungs every 6 hours as needed for wheezing   Quantity:  1 Inhaler   Refills:  10       ascorbic acid 500 MG Tabs        Dose:  500 mg   Take 500 mg by mouth daily   Refills:  0       atorvastatin 20 MG tablet   Commonly known as:  LIPITOR   Used for:  ACS (acute coronary syndrome) (H),  Hyperlipidemia with target LDL less than 130        Dose:  20 mg   Take 1 tablet (20 mg) by mouth daily   Quantity:  90 tablet   Refills:  0       BENADRYL 25 MG capsule   Generic drug:  diphenhydrAMINE        Dose:  25 mg   Take 25 mg by mouth At Bedtime   Refills:  0       calcium carbonate 500 MG tablet   Commonly known as:  OS-CARLOS 500 mg [place]. Ca        Dose:  500 mg   Take 500 mg by mouth daily   Refills:  0       dextromethorphan 30 MG/5ML liquid   Commonly known as:  DELSYM        Dose:  60 mg   Take 60 mg by mouth 2 times daily   Refills:  0       FERROUS GLUCONATE PO        Dose:  324 mg   Take 324 mg by mouth daily (with breakfast)   Refills:  0       fluticasone-salmeterol 250-50 MCG/DOSE diskus inhaler   Commonly known as:  ADVAIR   Used for:  COPD exacerbation (H)        Dose:  1 puff   Inhale 1 puff into the lungs every 12 hours   Quantity:  1 Inhaler   Refills:  10       furosemide 20 MG tablet   Commonly known as:  LASIX   Used for:  Localized edema        Dose:  20 mg   Take 1 tablet (20 mg) by mouth daily   Quantity:  30 tablet   Refills:  3       guaiFENesin 600 MG 12 hr tablet   Commonly known as:  MUCINEX        Dose:  600 mg   Take 600 mg by mouth 2 times daily as needed for congestion   Refills:  0       levothyroxine 75 MCG tablet   Commonly known as:  SYNTHROID/LEVOTHROID   Used for:  Other specified hypothyroidism        Dose:  75 mcg   Take 1 tablet (75 mcg) by mouth daily   Quantity:  90 tablet   Refills:  1       metoprolol 25 MG 24 hr tablet   Commonly known as:  TOPROL-XL   Used for:  ACS (acute coronary syndrome) (H)        Dose:  25 mg   Take 1 tablet (25 mg) by mouth daily   Quantity:  90 tablet   Refills:  2       MULTIVITAMIN GUMMIES ADULT PO        Dose:  1 tablet   Take 1 tablet by mouth daily   Refills:  0       traZODone 50 MG tablet   Commonly known as:  DESYREL   Used for:  Primary insomnia        Dose:  50 mg   Take 1 tablet (50 mg) by mouth nightly as needed for sleep    Quantity:  30 tablet   Refills:  8       TYLENOL PO        Dose:  1000 mg   Take 1,000 mg by mouth every 8 hours as needed for mild pain or fever   Refills:  0       vitamin D 2000 UNITS tablet   Used for:  Vitamin D deficiency        Dose:  2000 Units   Take 2,000 Units by mouth daily   Quantity:  100 tablet   Refills:  3       zolpidem 5 MG tablet   Commonly known as:  AMBIEN   Used for:  Anxiety        Dose:  5 mg   Take 1 tablet (5 mg) by mouth nightly as needed for sleep   Quantity:  30 tablet   Refills:  0       * Notice:  This list has 2 medication(s) that are the same as other medications prescribed for you. Read the directions carefully, and ask your doctor or other care provider to review them with you.      STOP taking     NAPROXEN SODIUM PO           ZITHROMAX Z-MEDHAT PO                Where to get your medicines      These medications were sent to [facility] [address] [address]  [address] [facility] [address]     Phone:  [phone]     allopurinol 100 MG tablet    predniSONE 10 MG tablet                Protect others around you: Learn how to safely use, store and throw away your medicines at www.disposemymeds.org.             Medication List: This is a list of all your medications and when to take them. Check marks below indicate your daily home schedule. Keep this list as a reference.      Medications           Morning Afternoon Evening Bedtime As Needed    * albuterol (2.5 MG/3ML) 0.083% neb solution   Take 1 vial (2.5 mg) by nebulization every 6 hours as needed for shortness of breath / dyspnea or wheezing   Last time this was given:  2.5 mg on 3/22/2017 12:14 PM                                   * albuterol 108 (90 BASE) MCG/ACT Inhaler   Commonly known as:  PROAIR HFA/PROVENTIL HFA/VENTOLIN HFA   Inhale 2 puffs into the lungs every 6 hours as needed for wheezing                                   allopurinol 100 MG tablet   Commonly known as:   ZYLOPRIM   Take 2 tablets (200 mg) by mouth 2 times daily for 10 days   Last time this was given:  200 mg on 3/27/2017  8:39 AM   Next Dose Due:  This evening 3/27/17                                      ascorbic acid 500 MG Tabs   Take 500 mg by mouth daily   Next Dose Due:  Resume as previously prescribed                                 atorvastatin 20 MG tablet   Commonly known as:  LIPITOR   Take 1 tablet (20 mg) by mouth daily   Last time this was given:  20 mg on 3/27/2017  8:39 AM   Next Dose Due:  Tomorrow morning 3/28/17                                   BENADRYL 25 MG capsule   Take 25 mg by mouth At Bedtime   Last time this was given:  25 mg on 3/26/2017 10:32 PM   Generic drug:  diphenhydrAMINE   Next Dose Due:  Tonight at bedtime 3/27/17                                   calcium carbonate 500 MG tablet   Commonly known as:  OS-CARLOS 500 mg [place]. Ca   Take 500 mg by mouth daily   Next Dose Due:  Resume as previously prescribed                                 dextromethorphan 30 MG/5ML liquid   Commonly known as:  DELSYM   Take 60 mg by mouth 2 times daily   Next Dose Due:  Resume as previously prescribed                                 FERROUS GLUCONATE PO   Take 324 mg by mouth daily (with breakfast)   Next Dose Due:  Resume as previously prescribed                                 fluticasone-salmeterol 250-50 MCG/DOSE diskus inhaler   Commonly known as:  ADVAIR   Inhale 1 puff into the lungs every 12 hours   Next Dose Due:  Resume as previously prescribed                                   furosemide 20 MG tablet   Commonly known as:  LASIX   Take 1 tablet (20 mg) by mouth daily   Last time this was given:  20 mg on 3/27/2017  8:39 AM   Next Dose Due:  Tomorrow morning 3/28/17                                   guaiFENesin 600 MG 12 hr tablet   Commonly known as:  MUCINEX   Take 600 mg by mouth 2 times daily as needed for congestion   Last time this was given:  600 mg on 3/27/2017  8:39 AM                                    levothyroxine 75 MCG tablet   Commonly known as:  SYNTHROID/LEVOTHROID   Take 1 tablet (75 mcg) by mouth daily   Last time this was given:  75 mcg on 3/27/2017  8:39 AM   Next Dose Due:  Tomorrow morning 3/28/17                                   metoprolol 25 MG 24 hr tablet   Commonly known as:  TOPROL-XL   Take 1 tablet (25 mg) by mouth daily   Last time this was given:  25 mg on 3/27/2017  8:39 AM   Next Dose Due:  Tomorrow morning 3/28/17                                   MULTIVITAMIN GUMMIES ADULT PO   Take 1 tablet by mouth daily   Next Dose Due:  Resume as previously prescribed                                 predniSONE 10 MG tablet   Commonly known as:  DELTASONE   By mouth, take 30 mg (3 tabs) daily for 3 days, then 20 mg (2 tabs) daily for 3 days, then 10 mg (1 tab) daily for 3 days, then stop   Last time this was given:  40 mg on 3/27/2017  8:39 AM   Next Dose Due:  3/28-3/30 take 30 mg in the morning   3/31-4/2 take 20 mg   4/3 -4/5 take 10 mg                                    traZODone 50 MG tablet   Commonly known as:  DESYREL   Take 1 tablet (50 mg) by mouth nightly as needed for sleep   Last time this was given:  50 mg on 3/17/2017  9:27 PM                                   TYLENOL PO   Take 1,000 mg by mouth every 8 hours as needed for mild pain or fever   Last time this was given:  650 mg on 3/23/2017  9:52 AM                                   vitamin D 2000 UNITS tablet   Take 2,000 Units by mouth daily   Next Dose Due:  Resume as previously prescribed                                 zolpidem 5 MG tablet   Commonly known as:  AMBIEN   Take 1 tablet (5 mg) by mouth nightly as needed for sleep   Last time this was given:  5 mg on 3/26/2017 10:33 PM                                   * Notice:  This list has 2 medication(s) that are the same as other medications prescribed for you. Read the directions carefully, and ask your doctor or other care provider to review them with you.

## 2017-03-17 NOTE — PROGRESS NOTES
"SPIRITUAL HEALTH SERVICES  SPIRITUAL ASSESSMENT Progress Note  [facility] ICU    PRIMARY FOCUS:     Goals of care    Emotional/spiritual/Rastafari distress    Support for coping    ILLNESS CIRCUMSTANCES:   Reviewed documentation. Reflective conversation shared with pt Marie which integrated elements of illness and family narratives.     Context of Serious Illness/Symptom(s) - Marie reported that she has a history of COPD and was admitted for SOB.    Persons/Resources Involved - She named her three surviving children, but two of them are vacationing in [place].    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  o She talked about various losses in her history but repeated several times \"God is good to me.\"    Spiritual/Latter day Distress - none directly expressed.    Social/Cultural/Economic Distress - none identified.    SPIRIT (Coping):     Advent/Caty - Marie is Baptist; she did not mention being affiliated with a Restorationist currently.    Spiritual Practice(s) - she talked about going to Restorationist services with her second ; she welcomed prayer.    Emotional/Existential/Relational Connections - Marie loves to paint but has not done much since moving to the [place] in 2015 and her heart attack last year.  She would like to return to it and is exploring a couple of ways to do so.    SENSE-MAKING:    Goals of Care - not discussed.  Reviewed with Marie how she can request another  visit per her needs.    Meaning/Sense-Making - Marie shared memories about her first and second husbands (both  of cancer); about her older daughter Jia Sellers, who  of an aneurysm at the age of 12; about how she  coped with the death of her daughter; about her life with her second .  She reflected that her second  felt like her \"soul mate\" and encouraged her to paint as " he pursued his own activities.  Marie was not able to articulate what helped her after the death of her second .      PLAN: No further plans; I and other chaplains remain available per pt's request.    Pelon Zamarripa M.Div., [facility]  Staff   Pager [phone]

## 2017-03-18 ENCOUNTER — APPOINTMENT (OUTPATIENT)
Dept: NUCLEAR MEDICINE | Facility: CLINIC | Age: 82
DRG: 189 | End: 2017-03-18
Attending: INTERNAL MEDICINE
Payer: MEDICARE

## 2017-03-18 LAB
ANION GAP SERPL CALCULATED.3IONS-SCNC: 5 MMOL/L (ref 3–14)
ANION GAP SERPL CALCULATED.3IONS-SCNC: 6 MMOL/L (ref 3–14)
BUN SERPL-MCNC: 15 MG/DL (ref 7–30)
BUN SERPL-MCNC: 20 MG/DL (ref 7–30)
CALCIUM SERPL-MCNC: 8.1 MG/DL (ref 8.5–10.1)
CALCIUM SERPL-MCNC: 8.7 MG/DL (ref 8.5–10.1)
CHLORIDE SERPL-SCNC: 98 MMOL/L (ref 94–109)
CHLORIDE SERPL-SCNC: 99 MMOL/L (ref 94–109)
CO2 SERPL-SCNC: 29 MMOL/L (ref 20–32)
CO2 SERPL-SCNC: 30 MMOL/L (ref 20–32)
CREAT SERPL-MCNC: 0.86 MG/DL (ref 0.52–1.04)
CREAT SERPL-MCNC: 0.89 MG/DL (ref 0.52–1.04)
ERYTHROCYTE [DISTWIDTH] IN BLOOD BY AUTOMATED COUNT: 16.7 % (ref 10–15)
GFR SERPL CREATININE-BSD FRML MDRD: 60 ML/MIN/1.7M2
GFR SERPL CREATININE-BSD FRML MDRD: 63 ML/MIN/1.7M2
GLUCOSE BLDC GLUCOMTR-MCNC: 156 MG/DL (ref 70–99)
GLUCOSE SERPL-MCNC: 132 MG/DL (ref 70–99)
GLUCOSE SERPL-MCNC: 143 MG/DL (ref 70–99)
HCT VFR BLD AUTO: 32.7 % (ref 35–47)
HEMOCCULT STL QL: NEGATIVE
HGB BLD-MCNC: 10 G/DL (ref 11.7–15.7)
HGB BLD-MCNC: 9.5 G/DL (ref 11.7–15.7)
LACTATE BLD-SCNC: 0.8 MMOL/L (ref 0.7–2.1)
MCH RBC QN AUTO: 28.1 PG (ref 26.5–33)
MCHC RBC AUTO-ENTMCNC: 29.1 G/DL (ref 31.5–36.5)
MCV RBC AUTO: 97 FL (ref 78–100)
PLATELET # BLD AUTO: 122 10E9/L (ref 150–450)
POTASSIUM SERPL-SCNC: 5.5 MMOL/L (ref 3.4–5.3)
POTASSIUM SERPL-SCNC: 6.3 MMOL/L (ref 3.4–5.3)
POTASSIUM SERPL-SCNC: 6.5 MMOL/L (ref 3.4–5.3)
RBC # BLD AUTO: 3.38 10E12/L (ref 3.8–5.2)
SODIUM SERPL-SCNC: 133 MMOL/L (ref 133–144)
SODIUM SERPL-SCNC: 134 MMOL/L (ref 133–144)
WBC # BLD AUTO: 209.1 10E9/L (ref 4–11)

## 2017-03-18 PROCEDURE — A9270 NON-COVERED ITEM OR SERVICE: HCPCS | Mod: GY | Performed by: HOSPITALIST

## 2017-03-18 PROCEDURE — A9270 NON-COVERED ITEM OR SERVICE: HCPCS | Mod: GY | Performed by: INTERNAL MEDICINE

## 2017-03-18 PROCEDURE — 36415 COLL VENOUS BLD VENIPUNCTURE: CPT | Performed by: HOSPITALIST

## 2017-03-18 PROCEDURE — 25000128 H RX IP 250 OP 636: Performed by: INTERNAL MEDICINE

## 2017-03-18 PROCEDURE — 25800025 ZZH RX 258: Performed by: INTERNAL MEDICINE

## 2017-03-18 PROCEDURE — 80048 BASIC METABOLIC PNL TOTAL CA: CPT | Performed by: INTERNAL MEDICINE

## 2017-03-18 PROCEDURE — 99233 SBSQ HOSP IP/OBS HIGH 50: CPT | Performed by: INTERNAL MEDICINE

## 2017-03-18 PROCEDURE — 94640 AIRWAY INHALATION TREATMENT: CPT

## 2017-03-18 PROCEDURE — 34300033 ZZH RX 343: Performed by: HOSPITALIST

## 2017-03-18 PROCEDURE — A9567 TECHNETIUM TC-99M AEROSOL: HCPCS | Performed by: HOSPITALIST

## 2017-03-18 PROCEDURE — 36415 COLL VENOUS BLD VENIPUNCTURE: CPT | Performed by: INTERNAL MEDICINE

## 2017-03-18 PROCEDURE — 85018 HEMOGLOBIN: CPT | Performed by: INTERNAL MEDICINE

## 2017-03-18 PROCEDURE — 82272 OCCULT BLD FECES 1-3 TESTS: CPT | Performed by: INTERNAL MEDICINE

## 2017-03-18 PROCEDURE — 12000011 ZZH R&B MS OVERFLOW

## 2017-03-18 PROCEDURE — 80048 BASIC METABOLIC PNL TOTAL CA: CPT | Performed by: HOSPITALIST

## 2017-03-18 PROCEDURE — 25000132 ZZH RX MED GY IP 250 OP 250 PS 637: Mod: GY | Performed by: HOSPITALIST

## 2017-03-18 PROCEDURE — 87640 STAPH A DNA AMP PROBE: CPT | Performed by: HOSPITALIST

## 2017-03-18 PROCEDURE — 87641 MR-STAPH DNA AMP PROBE: CPT | Performed by: HOSPITALIST

## 2017-03-18 PROCEDURE — 25000132 ZZH RX MED GY IP 250 OP 250 PS 637: Mod: GY | Performed by: INTERNAL MEDICINE

## 2017-03-18 PROCEDURE — 27210210 NM LUNG SCAN VENTILATION AND PERFUSION

## 2017-03-18 PROCEDURE — 00000146 ZZHCL STATISTIC GLUCOSE BY METER IP

## 2017-03-18 PROCEDURE — 93010 ELECTROCARDIOGRAM REPORT: CPT | Performed by: INTERNAL MEDICINE

## 2017-03-18 PROCEDURE — 25000128 H RX IP 250 OP 636: Performed by: HOSPITALIST

## 2017-03-18 PROCEDURE — A9540 TC99M MAA: HCPCS | Performed by: HOSPITALIST

## 2017-03-18 PROCEDURE — 93005 ELECTROCARDIOGRAM TRACING: CPT

## 2017-03-18 PROCEDURE — 25000125 ZZHC RX 250: Performed by: HOSPITALIST

## 2017-03-18 PROCEDURE — 94640 AIRWAY INHALATION TREATMENT: CPT | Mod: 76

## 2017-03-18 PROCEDURE — 85027 COMPLETE CBC AUTOMATED: CPT | Performed by: HOSPITALIST

## 2017-03-18 PROCEDURE — 40000275 ZZH STATISTIC RCP TIME EA 10 MIN

## 2017-03-18 PROCEDURE — 84132 ASSAY OF SERUM POTASSIUM: CPT | Performed by: INTERNAL MEDICINE

## 2017-03-18 RX ORDER — SODIUM POLYSTYRENE SULFONATE 15 G/60ML
30 SUSPENSION ORAL; RECTAL ONCE
Status: COMPLETED | OUTPATIENT
Start: 2017-03-18 | End: 2017-03-18

## 2017-03-18 RX ORDER — DEXTROSE MONOHYDRATE 25 G/50ML
25 INJECTION, SOLUTION INTRAVENOUS ONCE
Status: COMPLETED | OUTPATIENT
Start: 2017-03-18 | End: 2017-03-18

## 2017-03-18 RX ORDER — SODIUM POLYSTYRENE SULFONATE 15 G/60ML
30 SUSPENSION ORAL; RECTAL ONCE
Status: DISCONTINUED | OUTPATIENT
Start: 2017-03-18 | End: 2017-03-18

## 2017-03-18 RX ORDER — CALCIUM GLUCONATE 94 MG/ML
1 INJECTION, SOLUTION INTRAVENOUS ONCE
Status: DISCONTINUED | OUTPATIENT
Start: 2017-03-18 | End: 2017-03-18 | Stop reason: CLARIF

## 2017-03-18 RX ORDER — SODIUM POLYSTYRENE SULFONATE 15 G/60ML
30 SUSPENSION ORAL; RECTAL ONCE
Status: DISCONTINUED | OUTPATIENT
Start: 2017-03-18 | End: 2017-03-18 | Stop reason: CLARIF

## 2017-03-18 RX ORDER — CEFTRIAXONE 1 G/1
1 INJECTION, POWDER, FOR SOLUTION INTRAMUSCULAR; INTRAVENOUS EVERY 24 HOURS
Status: DISCONTINUED | OUTPATIENT
Start: 2017-03-18 | End: 2017-03-24

## 2017-03-18 RX ORDER — DEXTROSE MONOHYDRATE 100 MG/ML
INJECTION, SOLUTION INTRAVENOUS CONTINUOUS
Status: DISCONTINUED | OUTPATIENT
Start: 2017-03-18 | End: 2017-03-19

## 2017-03-18 RX ORDER — FUROSEMIDE 10 MG/ML
40 INJECTION INTRAMUSCULAR; INTRAVENOUS ONCE
Status: COMPLETED | OUTPATIENT
Start: 2017-03-18 | End: 2017-03-18

## 2017-03-18 RX ORDER — DEXTROSE MONOHYDRATE 25 G/50ML
50 INJECTION, SOLUTION INTRAVENOUS ONCE
Status: DISCONTINUED | OUTPATIENT
Start: 2017-03-18 | End: 2017-03-18

## 2017-03-18 RX ORDER — AZITHROMYCIN 250 MG/1
250 TABLET, FILM COATED ORAL DAILY
Status: DISCONTINUED | OUTPATIENT
Start: 2017-03-19 | End: 2017-03-19

## 2017-03-18 RX ADMIN — SODIUM POLYSTYRENE SULFONATE 30 G: 15 SUSPENSION ORAL; RECTAL at 18:39

## 2017-03-18 RX ADMIN — IPRATROPIUM BROMIDE AND ALBUTEROL SULFATE 3 ML: .5; 3 SOLUTION RESPIRATORY (INHALATION) at 20:00

## 2017-03-18 RX ADMIN — FUROSEMIDE 20 MG: 20 TABLET ORAL at 10:03

## 2017-03-18 RX ADMIN — SODIUM POLYSTYRENE SULFONATE 30 G: 15 SUSPENSION ORAL; RECTAL at 22:01

## 2017-03-18 RX ADMIN — METHYLPREDNISOLONE SODIUM SUCCINATE 40 MG: 40 INJECTION, POWDER, FOR SOLUTION INTRAMUSCULAR; INTRAVENOUS at 06:18

## 2017-03-18 RX ADMIN — LEVOTHYROXINE SODIUM 75 MCG: 75 TABLET ORAL at 08:54

## 2017-03-18 RX ADMIN — METOPROLOL SUCCINATE 25 MG: 25 TABLET, EXTENDED RELEASE ORAL at 10:03

## 2017-03-18 RX ADMIN — ATORVASTATIN CALCIUM 20 MG: 20 TABLET, FILM COATED ORAL at 10:02

## 2017-03-18 RX ADMIN — KIT FOR THE PREPARATION OF TECHNETIUM TC 99M PENTETATE 62.1 MCI.: 20 INJECTION, POWDER, LYOPHILIZED, FOR SOLUTION INTRAVENOUS; RESPIRATORY (INHALATION) at 17:34

## 2017-03-18 RX ADMIN — DEXTROSE MONOHYDRATE: 100 INJECTION, SOLUTION INTRAVENOUS at 23:47

## 2017-03-18 RX ADMIN — DEXTROSE MONOHYDRATE 25 G: 500 INJECTION PARENTERAL at 23:12

## 2017-03-18 RX ADMIN — Medication 3.3 MCI.: at 16:36

## 2017-03-18 RX ADMIN — AZITHROMYCIN 250 MG: 250 TABLET, FILM COATED ORAL at 10:02

## 2017-03-18 RX ADMIN — CEFTRIAXONE 1 G: 1 INJECTION, POWDER, FOR SOLUTION INTRAMUSCULAR; INTRAVENOUS at 18:39

## 2017-03-18 RX ADMIN — IPRATROPIUM BROMIDE AND ALBUTEROL SULFATE 3 ML: .5; 3 SOLUTION RESPIRATORY (INHALATION) at 11:32

## 2017-03-18 RX ADMIN — GUAIFENESIN 600 MG: 600 TABLET, EXTENDED RELEASE ORAL at 10:03

## 2017-03-18 RX ADMIN — HUMAN INSULIN 5 UNITS: 100 INJECTION, SOLUTION SUBCUTANEOUS at 23:14

## 2017-03-18 RX ADMIN — ZOLPIDEM TARTRATE 5 MG: 5 TABLET, FILM COATED ORAL at 23:56

## 2017-03-18 RX ADMIN — METHYLPREDNISOLONE SODIUM SUCCINATE 40 MG: 40 INJECTION, POWDER, FOR SOLUTION INTRAMUSCULAR; INTRAVENOUS at 18:39

## 2017-03-18 RX ADMIN — ENOXAPARIN SODIUM 40 MG: 40 INJECTION SUBCUTANEOUS at 10:02

## 2017-03-18 RX ADMIN — IPRATROPIUM BROMIDE AND ALBUTEROL SULFATE 3 ML: .5; 3 SOLUTION RESPIRATORY (INHALATION) at 07:25

## 2017-03-18 RX ADMIN — GUAIFENESIN 600 MG: 600 TABLET, EXTENDED RELEASE ORAL at 21:59

## 2017-03-18 RX ADMIN — FUROSEMIDE 40 MG: 10 INJECTION, SOLUTION INTRAVENOUS at 22:00

## 2017-03-18 RX ADMIN — FLUTICASONE FUROATE AND VILANTEROL TRIFENATATE 1 PUFF: 100; 25 POWDER RESPIRATORY (INHALATION) at 11:40

## 2017-03-18 RX ADMIN — METHYLPREDNISOLONE SODIUM SUCCINATE 40 MG: 40 INJECTION, POWDER, FOR SOLUTION INTRAMUSCULAR; INTRAVENOUS at 13:13

## 2017-03-18 RX ADMIN — SODIUM CHLORIDE 500 ML: 9 INJECTION, SOLUTION INTRAVENOUS at 22:00

## 2017-03-18 RX ADMIN — CALCIUM GLUCONATE 1 G: 94 INJECTION, SOLUTION INTRAVENOUS at 23:16

## 2017-03-18 RX ADMIN — BENZONATATE 100 MG: 100 CAPSULE, LIQUID FILLED ORAL at 11:09

## 2017-03-18 NOTE — PROGRESS NOTES
Oncology/Hematology Follow Up Note:    Assessment and Plan:   Admitted with shortness of breath 3/17    CLL Montero Stage III diagnosed in 2007 with lymphocytosis, anemia and thrombocytopenia  Has not been on any cytotoxic treatment  - was on IVIG every  month for hypogammaglobulinemia  - her WBC has been in the range of 160-180 k  - rest of labs have been stable.  - has never been on treatment, besides IVIG.  - can revisit this discussion, based on status.  - updated her on numerous options available for this, with less toxicity.    Leucocytosis  - stress/ infection/ steroids, along with CLL  - check LDH and uric acid    Pulmonary  - VQ scan pending.  - on IV antibiotics and IV steroids.  - count not much above baseline - hence don't think leukostasis.    Increased K  - await LDH  - on kayexalate    Code  - full    D/w Dr Jeffries.  Appreciate input.  Time: 35 minutes with patient , 30 minutes in counseling and coordination of care      Subjective:    Couldn't keep her pulse ox , up, so she called life alert, some coughing, denies worsening sweats, weight loss      Scheduled Medications:  Reviewed active medications    Labs:  CBC RESULTS:   Recent Labs   Lab Test  03/18/17   1510  03/18/17   0632  03/17/17   0515   07/08/16   1525   WBC   --   209.1*  207.8*   --   183.8*   HGB  10.0*  9.5*  11.0*   < >  10.7*   HCT   --   32.7*  36.5   --   36.7   MCV   --   97  95   --   91   PLT   --   122*  138*   --   249    < > = values in this interval not displayed.       CMP  Recent Labs  Lab 03/18/17  1510 03/18/17  0632 03/17/17  0515   NA  --  134 136   POTASSIUM 6.3* 5.5* 4.7   CHLORIDE  --  99 100   CO2  --  30 34*   ANIONGAP  --  5 2*   GLC  --  143* 148*   BUN  --  15 12   CR  --  0.86 1.01   GFRESTIMATED  --  63 52*   GFRESTBLACK  --  76 63   CARLOS  --  8.1* 8.2*   PROTTOTAL  --   --  6.8   ALBUMIN  --   --  3.6   BILITOTAL  --   --  0.5   ALKPHOS  --   --  151*   AST  --   --  33   ALT  --   --  20       [facility] lab  "results found in last 7 days.    Objective/Physical Exam:  Blood pressure (!) 100/37, pulse 80, temperature 97.9  F (36.6  C), temperature source Oral, resp. rate 20, height 1.473 m (4' 10\"), weight 50.6 kg (111 lb 9.6 oz), SpO2 91 %, not currently breastfeeding.  General appearance:alert, oriented, no acute distress  HEENT:sclera anicteric, no pallor, no thrush or mucositis.  Lungs:  Coarse breath sounds heard,   Abdomen: soft, NT, ND , BS normal  Ext: no edema or rashes    Isabel Ivory MD  [facility]  3/18/2017 6:04 PM      "

## 2017-03-18 NOTE — PROGRESS NOTES
[facility]  Hospitalist Progress Note    Name: Marie Kline    MRN: [id number]  Provider:  Hai Jeffries MD, [facility]    Date of Service: 03/18/2017     Reason for Stay (Diagnosis): SOB         Summary of hospital stay & Assessment/Plan:   Summary of Stay: Marie Kline is a 84 year old female who was admitted on 3/17/2017  84 year old female with PMH significant for COPD, CHF, CAD with Cardiomyopathy and CLL presented to ED with Progressively shortness of Breath admitted for Acute respiratory failure with hypoxia.    Problem List:   1. Acute respiratory failure hypoxic likely secondary to COPD exacerbation, I agree with Dr. Hughes patient is at risk for pulmonary embolism because of her CLL and significant leukocytosis.  She did not have chest CT because of her contrast allergy, will order VQ scan. On Zithromax and rocephin to cover for pneumonia carmella she is immunocompromised and get IV Ig regulary.    2. No evidence of CHF exacerbation, ejection fraction was normal on echocardiogram and no evidence of pulmonary hypertension  3. History of CLL, her white count is significantly higher than baseline we will request hematology evaluation, patient has been seen before by Dr. Ivory  4. Questionable melena, Hemoccult will be sent and repeat hemoglobin also there is concern of hemoglobin dropped from 11 range to 9.5, I would question the 11 on admission since patient is more in the 9 range    Continue COPD exacerbation treatment with oxygen supplementation, nebulizer, IV Solu-Medrol  Check pulmonary embolism VQ ordered   Check Hemoccult and follow-up hemoglobin  Discontinue Lovenox till it becomes clear if she has GI bleeding or not      DVT Prophylaxis: Enoxaprain (Lovenox) SQ  Code Status:  Full Code  Discharge Dispo: Home vs TCU  Estimated # of Days until Disch: 3 days needs to have significant improvement in her breathing prior to discharge, not on O2 at baseline although she does have a concentrator at  home she doesn't usually use it                 Interval History:   Concern from nursing staff the patient had a black stool or darker in color today cannot confirm, patient continues to have significant shortness of breath requiring oxygen supplementation              Case discussed with the patient's nurse               Physical Exam:         Physical Exam   Temp: 97.1  F (36.2  C) Temp src: Oral BP: 115/44 Pulse: 80 Heart Rate: 77 Resp: 20 SpO2: (!) 87 % O2 Device: Nasal cannula Oxygen Delivery: 3 LPM  Vitals:    03/17/17 0800 03/18/17 0500   Weight: 50.3 kg (110 lb 14.3 oz) 50.6 kg (111 lb 9.6 oz)     I/O last 3 completed shifts:  In: 630 [P.O.:630]  Out: 300 [Urine:300]    GENERAL:  Comfortable.  Cachectic  PSYCH: pleasant, oriented, No acute distress.  EYES: PERRLA, Normal conjunctiva.  HEART:  Normal S1, S2 with no edema.  LUNGS:  Significant rhonchi and wheezes bilaterally with decreased air entry and increased respiratory effort   ABDOMEN:  Soft, no hepatosplenomegaly, normal bowel sounds.  SKIN:  Dry to touch, No rash.    Medications        sodium chloride (PF)  3 mL Intracatheter Q8H     atorvastatin  20 mg Oral Daily     diphenhydrAMINE  25 mg Oral At Bedtime     furosemide  20 mg Oral Daily     metoprolol  25 mg Oral Daily     levothyroxine  75 mcg Oral Daily     methylPREDNISolone  40 mg Intravenous Q6H     ipratropium - albuterol 0.5 mg/2.5 mg/3 mL  3 mL Nebulization 4x daily     guaiFENesin  600 mg Oral BID     fluticasone-vilanterol  1 puff Inhalation Daily     Data     -Data reviewed today:  I personally reviewed  all new labs and imaging results over the last 24 hours.      Recent Labs  Lab 03/18/17  0632 03/17/17  0515   .1* 207.8*   HGB 9.5* 11.0*   HCT 32.7* 36.5   MCV 97 95   * 138*       Recent Labs  Lab 03/18/17  0632 03/17/17  0515    136   POTASSIUM 5.5* 4.7   CHLORIDE 99 100   CO2 30 34*   ANIONGAP 5 2*   * 148*   BUN 15 12   CR 0.86 1.01   GFRESTIMATED 63 52*    [facility] 76 63   [facility] 8.1* 8.2*       No results found for this or any previous visit (from the past 24 hour(s)).    This document was produced using voice recognition software

## 2017-03-18 NOTE — PLAN OF CARE
Problem: Goal Outcome Summary  Goal: Goal Outcome Summary  VSS, afebrile.  93% on 3L nasal cannula. Denies pain.  AAOx4.  Tolerates diet, denies nausea. Up SBA. Continent. Lactic recheck was 0.8 after 500cc fluid bolus. PIV SL.  Scheduled IV solumedrol and nebs. Skin intact.  BS active.  LS- diminished, exp wheezes, dry non productive infrequent cough, dyspnea on exertion.  Continue with POC.

## 2017-03-18 NOTE — PROGRESS NOTES
Repeat K was 6.3  Kayexalte 30g ordered   Repeat at 7pm  Might be from severe leukocytosis hemolysis.  Keep on Tele

## 2017-03-18 NOTE — PLAN OF CARE
Problem: Goal Outcome Summary  Goal: Goal Outcome Summary  Outcome: No Change  Pt VSS  Denies pain  SOB with exertion, hypoxic with extended periods of talking oxygen at 3 LPM nasal cannula  Coughing, intermittently productive  Continue with IV steroids, getting shaky after steroids  Nebs continue  Reports of dark stools, sent sample for occult blood  Recheck hgb in the AM

## 2017-03-18 NOTE — PROGRESS NOTES
SW Consult:    D: Patient is an 84 yr old woman who lives in her own handicap accessible apartment at Located within [facility]: (Main Ph: [phone]) Patient informed that she used her call pendant (Alert 1) to call 911 due to SOB.    I/A: Patient was frail looking, lying in bed, wearing oxygen, stating that she is exhausted as she just had another coughing spell. SW introduced self/role in DC planning. Patient anticipates being able to return to [facility] upon DC. Patient informed that she contracts for 20 meals per month and Housekeeping, she still drives and previously independent with bathing, medications, transportation, shopping, finances. Patient wears an emergency call pendant which she pays for through '[employer]' which is a system that works in the community. Patient has 3 kids- (2 sons out of state) and dtr who lives in [facility] and neighbors who assist as needed. SW spoke to CORAL Ortega @ [facility]. Patient previously contracted for no services.  Patients DC needs TBD.    P: SW to call [employer] at [phone] if there are DC needs identified. SW to follow for DC needs.

## 2017-03-18 NOTE — PROGRESS NOTES
[facility]    Sepsis Evaluation Progress Note    Date of Service: 03/17/2017    I was called to see Marie Kline due to abnormal vital signs triggering the Sepsis SIRS screening alert. She is not known to have an infection.     Physical Exam    Vital Signs:  Temp: 97.1  F (36.2  C) Temp src: Axillary BP: 135/61   Heart Rate: 69 Resp: 22 SpO2: 90 % O2 Device: Nasal cannula Oxygen Delivery: 3 LPM    Lab:  Lactic Acid   Date Value Ref Range Status   03/17/2017 2.4 (H) 0.7 - 2.1 mmol/L Final       The patient is at baseline mental status.    The rest of their physical exam is significant for DIMINISHED BS.    Assessment and Plan    The SIRS and exam findings are likely due to NEBS, there is no sign of sepsis at this time.    Disposition: The patient will remain on the current unit. We will continue to monitor this patient closely.    Rangel Alvarez MD

## 2017-03-18 NOTE — PLAN OF CARE
Problem: Individualization  Goal: Patient Individualization  Outcome: No Change  Pt admitted and being treated for COPD exacerbation. Pt alert and oriented, vitals stable, afebrile. 91-92% on 3L oxygen via nasal cannula. Dyspneic on exertion. Up w/ SBA. PIV-saline locked. Pt c/o headache-requested coffee and headache resolved. Voiding w/o difficulty, LBM 3/17/17 evening. No c/o nausea/emesis. Pt did trigger sepsis protocol, vitals stable-RR 22 and WBC elevated. Lactic back @ 2.4, 500cc bolus ordered w/ recheck of lactic. Plan: TBD, will continue to monitor and provide supportive care.

## 2017-03-19 ENCOUNTER — APPOINTMENT (OUTPATIENT)
Dept: ULTRASOUND IMAGING | Facility: CLINIC | Age: 82
DRG: 189 | End: 2017-03-19
Attending: INTERNAL MEDICINE
Payer: MEDICARE

## 2017-03-19 LAB
ANION GAP SERPL CALCULATED.3IONS-SCNC: 7 MMOL/L (ref 3–14)
ANISOCYTOSIS BLD QL SMEAR: SLIGHT
BASOPHILS # BLD AUTO: 0 10E9/L (ref 0–0.2)
BASOPHILS NFR BLD AUTO: 0 %
BUN SERPL-MCNC: 18 MG/DL (ref 7–30)
CALCIUM SERPL-MCNC: 8.4 MG/DL (ref 8.5–10.1)
CHLORIDE SERPL-SCNC: 100 MMOL/L (ref 94–109)
CO2 SERPL-SCNC: 32 MMOL/L (ref 20–32)
CREAT SERPL-MCNC: 0.82 MG/DL (ref 0.52–1.04)
DIFFERENTIAL METHOD BLD: ABNORMAL
EOSINOPHIL # BLD AUTO: 0 10E9/L (ref 0–0.7)
EOSINOPHIL NFR BLD AUTO: 0 %
ERYTHROCYTE [DISTWIDTH] IN BLOOD BY AUTOMATED COUNT: 17 % (ref 10–15)
ERYTHROCYTE [DISTWIDTH] IN BLOOD BY AUTOMATED COUNT: 17.7 % (ref 10–15)
FLUAV+FLUBV RNA SPEC QL NAA+PROBE: NORMAL
FLUAV+FLUBV RNA SPEC QL NAA+PROBE: NORMAL
GFR SERPL CREATININE-BSD FRML MDRD: 66 ML/MIN/1.7M2
GLUCOSE BLDC GLUCOMTR-MCNC: 140 MG/DL (ref 70–99)
GLUCOSE BLDC GLUCOMTR-MCNC: 172 MG/DL (ref 70–99)
GLUCOSE BLDC GLUCOMTR-MCNC: 176 MG/DL (ref 70–99)
GLUCOSE BLDC GLUCOMTR-MCNC: 185 MG/DL (ref 70–99)
GLUCOSE BLDC GLUCOMTR-MCNC: 189 MG/DL (ref 70–99)
GLUCOSE BLDC GLUCOMTR-MCNC: 203 MG/DL (ref 70–99)
GLUCOSE BLDC GLUCOMTR-MCNC: 211 MG/DL (ref 70–99)
GLUCOSE SERPL-MCNC: 144 MG/DL (ref 70–99)
GLUCOSE SERPL-MCNC: 186 MG/DL (ref 70–99)
HCT VFR BLD AUTO: 34.1 % (ref 35–47)
HCT VFR BLD AUTO: 35.1 % (ref 35–47)
HGB BLD-MCNC: 9.6 G/DL (ref 11.7–15.7)
HGB BLD-MCNC: 9.8 G/DL (ref 11.7–15.7)
HYPOCHROMIA BLD QL: PRESENT
LDH SERPL L TO P-CCNC: 288 U/L (ref 81–234)
LYMPHOCYTES # BLD AUTO: 270.9 10E9/L (ref 0.8–5.3)
LYMPHOCYTES NFR BLD AUTO: 96 %
MACROCYTES BLD QL SMEAR: PRESENT
MCH RBC QN AUTO: 27.1 PG (ref 26.5–33)
MCH RBC QN AUTO: 28.1 PG (ref 26.5–33)
MCHC RBC AUTO-ENTMCNC: 27.4 G/DL (ref 31.5–36.5)
MCHC RBC AUTO-ENTMCNC: 28.7 G/DL (ref 31.5–36.5)
MCV RBC AUTO: 98 FL (ref 78–100)
MCV RBC AUTO: 99 FL (ref 78–100)
MICROCYTES BLD QL SMEAR: PRESENT
MONOCYTES # BLD AUTO: 5.6 10E9/L (ref 0–1.3)
MONOCYTES NFR BLD AUTO: 2 %
MRSA DNA SPEC QL NAA+PROBE: NORMAL
NEUTROPHILS # BLD AUTO: 5.6 10E9/L (ref 1.6–8.3)
NEUTROPHILS NFR BLD AUTO: 2 %
OVALOCYTES BLD QL SMEAR: SLIGHT
PLATELET # BLD AUTO: 129 10E9/L (ref 150–450)
PLATELET # BLD AUTO: 130 10E9/L (ref 150–450)
PLATELET # BLD EST: ABNORMAL 10*3/UL
POIKILOCYTOSIS BLD QL SMEAR: SLIGHT
POTASSIUM SERPL-SCNC: 4.3 MMOL/L (ref 3.4–5.3)
POTASSIUM SERPL-SCNC: 5 MMOL/L (ref 3.4–5.3)
POTASSIUM SERPL-SCNC: 5.3 MMOL/L (ref 3.4–5.3)
POTASSIUM SERPL-SCNC: 5.5 MMOL/L (ref 3.4–5.3)
POTASSIUM SERPL-SCNC: 6.3 MMOL/L (ref 3.4–5.3)
RBC # BLD AUTO: 3.49 10E12/L (ref 3.8–5.2)
RBC # BLD AUTO: 3.54 10E12/L (ref 3.8–5.2)
RETICS # AUTO: 64.4 10E9/L (ref 25–95)
RETICS/RBC NFR AUTO: 1.8 % (ref 0.5–2)
RSV RNA SPEC NAA+PROBE: NORMAL
SMUDGE CELLS BLD QL SMEAR: PRESENT
SODIUM SERPL-SCNC: 139 MMOL/L (ref 133–144)
SPECIMEN SOURCE: NORMAL
SPECIMEN SOURCE: NORMAL
URATE SERPL-MCNC: 5.4 MG/DL (ref 2.6–6)
WBC # BLD AUTO: 243.6 10E9/L (ref 4–11)
WBC # BLD AUTO: 282.1 10E9/L (ref 4–11)

## 2017-03-19 PROCEDURE — 85060 BLOOD SMEAR INTERPRETATION: CPT | Performed by: INTERNAL MEDICINE

## 2017-03-19 PROCEDURE — 25000125 ZZHC RX 250: Performed by: INTERNAL MEDICINE

## 2017-03-19 PROCEDURE — 99207 ZZC APP CREDIT; MD BILLING SHARED VISIT: CPT | Performed by: INTERNAL MEDICINE

## 2017-03-19 PROCEDURE — A9270 NON-COVERED ITEM OR SERVICE: HCPCS | Mod: GY | Performed by: HOSPITALIST

## 2017-03-19 PROCEDURE — 40000809 ZZH STATISTIC NO DOCUMENTATION TO SUPPORT CHARGE

## 2017-03-19 PROCEDURE — 83615 LACTATE (LD) (LDH) ENZYME: CPT | Performed by: INTERNAL MEDICINE

## 2017-03-19 PROCEDURE — 36415 COLL VENOUS BLD VENIPUNCTURE: CPT | Performed by: INTERNAL MEDICINE

## 2017-03-19 PROCEDURE — 99233 SBSQ HOSP IP/OBS HIGH 50: CPT | Performed by: INTERNAL MEDICINE

## 2017-03-19 PROCEDURE — 25000125 ZZHC RX 250: Performed by: HOSPITALIST

## 2017-03-19 PROCEDURE — 40000275 ZZH STATISTIC RCP TIME EA 10 MIN

## 2017-03-19 PROCEDURE — 85027 COMPLETE CBC AUTOMATED: CPT | Performed by: INTERNAL MEDICINE

## 2017-03-19 PROCEDURE — 87631 RESP VIRUS 3-5 TARGETS: CPT | Performed by: INTERNAL MEDICINE

## 2017-03-19 PROCEDURE — 40000847 ZZHCL STATISTIC MORPHOLOGY W/INTERP HISTOLOGY TC 85060: Performed by: INTERNAL MEDICINE

## 2017-03-19 PROCEDURE — 84550 ASSAY OF BLOOD/URIC ACID: CPT | Performed by: INTERNAL MEDICINE

## 2017-03-19 PROCEDURE — 00000146 ZZHCL STATISTIC GLUCOSE BY METER IP

## 2017-03-19 PROCEDURE — 84132 ASSAY OF SERUM POTASSIUM: CPT | Performed by: INTERNAL MEDICINE

## 2017-03-19 PROCEDURE — 25000128 H RX IP 250 OP 636: Performed by: INTERNAL MEDICINE

## 2017-03-19 PROCEDURE — 25000132 ZZH RX MED GY IP 250 OP 250 PS 637: Mod: GY | Performed by: HOSPITALIST

## 2017-03-19 PROCEDURE — A9270 NON-COVERED ITEM OR SERVICE: HCPCS | Mod: GY | Performed by: INTERNAL MEDICINE

## 2017-03-19 PROCEDURE — 12000007 ZZH R&B INTERMEDIATE

## 2017-03-19 PROCEDURE — 82947 ASSAY GLUCOSE BLOOD QUANT: CPT | Performed by: HOSPITALIST

## 2017-03-19 PROCEDURE — 94640 AIRWAY INHALATION TREATMENT: CPT

## 2017-03-19 PROCEDURE — 25800025 ZZH RX 258: Performed by: INTERNAL MEDICINE

## 2017-03-19 PROCEDURE — 25000132 ZZH RX MED GY IP 250 OP 250 PS 637: Mod: GY | Performed by: INTERNAL MEDICINE

## 2017-03-19 PROCEDURE — 82947 ASSAY GLUCOSE BLOOD QUANT: CPT | Performed by: INTERNAL MEDICINE

## 2017-03-19 PROCEDURE — 25000128 H RX IP 250 OP 636: Performed by: HOSPITALIST

## 2017-03-19 PROCEDURE — 85045 AUTOMATED RETICULOCYTE COUNT: CPT | Performed by: INTERNAL MEDICINE

## 2017-03-19 PROCEDURE — 93970 EXTREMITY STUDY: CPT

## 2017-03-19 PROCEDURE — 80048 BASIC METABOLIC PNL TOTAL CA: CPT | Performed by: INTERNAL MEDICINE

## 2017-03-19 PROCEDURE — 85025 COMPLETE CBC W/AUTO DIFF WBC: CPT | Performed by: INTERNAL MEDICINE

## 2017-03-19 PROCEDURE — 94640 AIRWAY INHALATION TREATMENT: CPT | Mod: 76

## 2017-03-19 RX ORDER — SODIUM POLYSTYRENE SULFONATE 15 G/60ML
15 SUSPENSION ORAL; RECTAL ONCE
Status: COMPLETED | OUTPATIENT
Start: 2017-03-19 | End: 2017-03-19

## 2017-03-19 RX ORDER — FUROSEMIDE 10 MG/ML
40 INJECTION INTRAMUSCULAR; INTRAVENOUS ONCE
Status: DISCONTINUED | OUTPATIENT
Start: 2017-03-19 | End: 2017-03-27 | Stop reason: HOSPADM

## 2017-03-19 RX ORDER — DEXTROSE MONOHYDRATE 100 MG/ML
INJECTION, SOLUTION INTRAVENOUS CONTINUOUS
Status: ACTIVE | OUTPATIENT
Start: 2017-03-19 | End: 2017-03-19

## 2017-03-19 RX ORDER — DEXTROSE MONOHYDRATE 25 G/50ML
25 INJECTION, SOLUTION INTRAVENOUS ONCE
Status: COMPLETED | OUTPATIENT
Start: 2017-03-19 | End: 2017-03-19

## 2017-03-19 RX ADMIN — CALCIUM GLUCONATE 1 G: 94 INJECTION, SOLUTION INTRAVENOUS at 05:00

## 2017-03-19 RX ADMIN — DEXTROSE MONOHYDRATE 25 G: 500 INJECTION PARENTERAL at 04:12

## 2017-03-19 RX ADMIN — IPRATROPIUM BROMIDE AND ALBUTEROL SULFATE 3 ML: .5; 3 SOLUTION RESPIRATORY (INHALATION) at 16:26

## 2017-03-19 RX ADMIN — LEVOTHYROXINE SODIUM 75 MCG: 75 TABLET ORAL at 10:09

## 2017-03-19 RX ADMIN — METHYLPREDNISOLONE SODIUM SUCCINATE 40 MG: 40 INJECTION, POWDER, FOR SOLUTION INTRAMUSCULAR; INTRAVENOUS at 06:49

## 2017-03-19 RX ADMIN — DEXTROSE MONOHYDRATE: 100 INJECTION, SOLUTION INTRAVENOUS at 05:00

## 2017-03-19 RX ADMIN — CEFTRIAXONE 1 G: 1 INJECTION, POWDER, FOR SOLUTION INTRAMUSCULAR; INTRAVENOUS at 18:16

## 2017-03-19 RX ADMIN — FUROSEMIDE 20 MG: 20 TABLET ORAL at 10:09

## 2017-03-19 RX ADMIN — ATORVASTATIN CALCIUM 20 MG: 20 TABLET, FILM COATED ORAL at 10:09

## 2017-03-19 RX ADMIN — SODIUM POLYSTYRENE SULFONATE 15 G: 15 SUSPENSION ORAL; RECTAL at 17:32

## 2017-03-19 RX ADMIN — METHYLPREDNISOLONE SODIUM SUCCINATE 40 MG: 40 INJECTION, POWDER, FOR SOLUTION INTRAMUSCULAR; INTRAVENOUS at 23:40

## 2017-03-19 RX ADMIN — FLUTICASONE FUROATE AND VILANTEROL TRIFENATATE 1 PUFF: 100; 25 POWDER RESPIRATORY (INHALATION) at 10:09

## 2017-03-19 RX ADMIN — METHYLPREDNISOLONE SODIUM SUCCINATE 40 MG: 40 INJECTION, POWDER, FOR SOLUTION INTRAMUSCULAR; INTRAVENOUS at 12:13

## 2017-03-19 RX ADMIN — IPRATROPIUM BROMIDE AND ALBUTEROL SULFATE 3 ML: .5; 3 SOLUTION RESPIRATORY (INHALATION) at 07:36

## 2017-03-19 RX ADMIN — FLUTICASONE FUROATE AND VILANTEROL TRIFENATATE 1 PUFF: 100; 25 POWDER RESPIRATORY (INHALATION) at 07:36

## 2017-03-19 RX ADMIN — AZITHROMYCIN 250 MG: 250 TABLET, FILM COATED ORAL at 10:09

## 2017-03-19 RX ADMIN — GUAIFENESIN 600 MG: 600 TABLET, EXTENDED RELEASE ORAL at 10:09

## 2017-03-19 RX ADMIN — DIPHENHYDRAMINE HYDROCHLORIDE 25 MG: 25 CAPSULE ORAL at 21:54

## 2017-03-19 RX ADMIN — METHYLPREDNISOLONE SODIUM SUCCINATE 40 MG: 40 INJECTION, POWDER, FOR SOLUTION INTRAMUSCULAR; INTRAVENOUS at 00:21

## 2017-03-19 RX ADMIN — IPRATROPIUM BROMIDE AND ALBUTEROL SULFATE 3 ML: .5; 3 SOLUTION RESPIRATORY (INHALATION) at 11:57

## 2017-03-19 RX ADMIN — DIPHENHYDRAMINE HYDROCHLORIDE 25 MG: 25 CAPSULE ORAL at 00:28

## 2017-03-19 RX ADMIN — GUAIFENESIN 600 MG: 600 TABLET, EXTENDED RELEASE ORAL at 21:54

## 2017-03-19 RX ADMIN — METHYLPREDNISOLONE SODIUM SUCCINATE 40 MG: 40 INJECTION, POWDER, FOR SOLUTION INTRAMUSCULAR; INTRAVENOUS at 18:16

## 2017-03-19 RX ADMIN — HUMAN INSULIN 5 UNITS: 100 INJECTION, SOLUTION SUBCUTANEOUS at 04:12

## 2017-03-19 RX ADMIN — ZOLPIDEM TARTRATE 5 MG: 5 TABLET, FILM COATED ORAL at 21:54

## 2017-03-19 RX ADMIN — METOPROLOL SUCCINATE 25 MG: 25 TABLET, EXTENDED RELEASE ORAL at 10:09

## 2017-03-19 RX ADMIN — IPRATROPIUM BROMIDE AND ALBUTEROL SULFATE 3 ML: .5; 3 SOLUTION RESPIRATORY (INHALATION) at 19:36

## 2017-03-19 NOTE — PROGRESS NOTES
Called for Hyperkalemia  -- high despite initial treatment  -- D50/insulin  --additional lasixs  -- calcium gluconate  -- recheck

## 2017-03-19 NOTE — PLAN OF CARE
Problem: Goal Outcome Summary  Goal: Goal Outcome Summary  Outcome: No Change  ICU End of Shift Summary.  For vital signs and complete assessments, please see documentation flowsheets.      Pertinent assessments: 5th floor transfer tele overflow arrived to floor around 2345. A&O. Denies pain. 3L 02 in low  90's. L/s diminished and coarse with expiratory wheezing. BENJAMIN. Infrequent, good, productive cough. On  zithro, rocephin, nebs and solumedrol. Oncology follows for hx CLL. Tele SR with 1st degree AVB. Diarrhea  secondary to kayexalate. Hemorrhoids, barrier cream applied. Fragile, bruised skin, intact.  Major Shift Events: K 6.5 - patient given 1g calcium gluconate, 25g D5, 5 units regular insulin, and D10 IVF for 2 hours. K recheck 6.3 - Hospitalist notified, received orders to give additional 1g calcium gluconate, 25g D5, 5 units regular insulin, and D10 IVF for 4 hours. Timed potassium lab draws ordered for later in shift. MD notified NM VQ scan showed 20-80% probability PE, results not addressed in any notes. Spoke with Hospitalist Dr. Ma and no plans to start anticoagulation this shift as results are indeterminate and results could be secondary to COPD. Decision to start anticoagulation will be left for day rounder.  Plan (Upcoming Events): Continue respiratory support, monitor and correct potassium.   Discharge/Transfer Needs: Anticipate return to [facility] at d/c.     Bedside Shift Report Completed   Bedside Safety Check Completed

## 2017-03-19 NOTE — PROGRESS NOTES
Notified of rising K to 6.5.  Discussed case w/ daytime rounder earlier.  -plan to shift w/ insulin/dextrose  -calcium gluconate   -give 500 cc NS bolus with 40 mg IV K to help w/ elimination  -repeat kayexelate now  -monitor closely.  Does not require ICU cares though I am informed she will transfer to that unit as an over-flow patient.    Update: I was notified that she had a V/Q scan earlier today which may not have been reviewed yet.  This returned as indeterminate/intermediate probability.  In the setting of structural lung disease it would be somewhat unlikely to get a low probability scan.  Since she does have a copd exacerbation which I feel is more likely the cause of her hypoxia I will defer heparin/AC at this time based on my evaluation of the risks/benefits.  If her rounding MD or hematology feel heparin is warranted they may pursue this later this morning.    Randy Ma MD

## 2017-03-19 NOTE — PROGRESS NOTES
"[facility]  Hospitalist Progress Note  Fide Wilkes MD 03/19/2017    Reason for Stay (Diagnosis): Acute hypoxic respiratory failure         Assessment and Plan:      Summary of Stay: Marie Kline is a 84 year old female with a history of COPD (not on home O2-but does have a concentrator at home), \"CHF\" with normal echo during this admission (no dd or systolic dysfunction), CAD, CLL with severe leukocytosis (baseline is 160-180 range) admitted on 3/17/2017 with acute hypoxic respiratory failure 2/2 COPD exacerbation.     3 days pta she was seen by her primary MD and initiated on azith for suspected bronchitis. Her admission CXR is without e/o infiltrate but notable for central emphysematous changes. Influenza agn negative.  She remains afebrile.  procal not completed, likely due to pta abx. There is no clinical e/o volume overload and echo shows normal cardiac function, and bnp is not elevated.     Given her chronic CLL and concern for possible PE V/Q testing was completed and of indeterminate probability in light of her underlying emphysema.  Her admission WBC notable for being in the 208 range, and now today is at 240.  There was also some concern of melena but guiac testing is negative    Her brief hospitalization has been complicated by hyperkalemia into the 6.5 range, necessitating transfer to the ICU for K shifting medications, and kayexalate for body elimination.  Her K this am is wnl.   Problem List:   1. Acute hypoxic respiratory failure:  COPD with exacerbation presumed.  VQ completed yesterday indeterminate for PE, but echo without e/o RV overload or pulmonary hypertension, will check dopplers for completeness sake-but I doubt this is PE.  D-dimer will not be helpful in this situation in light of suspected active infection   2. Acute COPD with exacerbation-? Due to viral issue vs bronchitis vs environmental irritant.  Currently on methylpred 40 mg IV q8-given the substantially elevated WBC will " "taper back to 40 mg IV bid.  Now on day 6 of azith (so will d/c today).  3. COPD:  home regimen is :  flutic-salmeterol 250-50, prn albuterol.  I see no anticholinergic agent  4. CLL:  With severe leukocytosis-243-not uncommon for her per her report and in discussion with Dr. Ivory.   5. Hyperkalemia:  ? Due to leukocytosis-I see no other reason, renal function is wnl/no culprit meds id'd.  Treated with IVF/Insulin-glucose/calcium for K shifting, and furosemide/kayexalate for K removal.  Recheck K today trending back up so will give additional kayexalate-discussed with evening hospitalist who will f/u   6. CAD:  Cont statin/BB, but I see NO antiplatelet agent  7. Htn:  On metop xl 25 mg and furosemide 20 mg  DVT Prophylaxis: enox dc'd in light of ? Melena, now will hold in light of hyperkalemia (theorhetical issue but known to occur with heparin)-start PCDs if LE dopplers negative  Code Status: Full Code  Discharge Dispo: Unclear  Estimated Disch Date / # of Days until Disch: 3-5 ?? Maybe more        Interval History (Subjective):      Doing ok, still quite sob.  No cp, no n/v                  Physical Exam:      Last Vital Signs:  /43 (BP Location: Left arm)  Pulse 80  Temp 98  F (36.7  C) (Oral)  Resp 21  Ht 1.473 m (4' 10\")  Wt 50.2 kg (110 lb 10.7 oz)  SpO2 95%  BMI 23.13 kg/m2    I/O of questionable accuracy-currently looks about 1800 cc up.  Weight stable through yesterday-not checked today  Tele:  NSR    Pleasant nad looks stated age head nc/at sclera clear lungs moderate air movement at best, scattered wheeze.  RRR no m/r/g no le edema skin w/d no c/c abd s/nt/nd alert and oriented affect appropriate fernández            Medications:      All current medications were reviewed with changes reflected in problem list.         Data:      All new lab and imaging data was reviewed.   Labs:    Recent Labs  Lab 03/19/17  1609  03/19/17  0530   NA  --   --  139   POTASSIUM 5.5*  < > 4.3   CHLORIDE  --   --  100   CO2 "  --   --  32   ANIONGAP  --   --  7   GLC  --   --  186*   BUN  --   --  18   CR  --   --  0.82   GFRESTIMATED  --   --  66   GFRESTBLACK  --   --  80   CARLOS  --   --  8.4*   < > = values in this interval not displayed.    Recent Labs  Lab 03/19/17  0530   .6*   HGB 9.8*   HCT 34.1*   MCV 98   *      Imaging:   B LE dopplers 3/19/17       FINDINGS: Negative. No evidence for DVT. Short term followup  recommended if symptoms persist.

## 2017-03-19 NOTE — CONSULTS
Care Transition Initial Assessment - RN    Reason For Consult: discharge planning   Met with: Patient.    DATA   Active Problems:    COPD exacerbation (H)       Cognitive Status: awake, alert and oriented.  [facility]  Primary Care MD Name: Dr. Kiser  Contact information and PCP information verified: Yes    Lives With: alone  Living Arrangements: independent living facility  Quality Of Family Relationships: supportive, helpful, involved  Description of Support System: Supportive, Involved   Who is your support system?: Children   Support Assessment: Adequate family and caregiver support, Adequate social supports     Insurance concerns: No Insurance issues identified    ASSESSMENT  Patient currently receives the following services:  n/a        Identified issues/concerns regarding health management: Met with patient, was resting in bed.  Demographics verified.  Patient is from [facility].  She states she still drives and usually does her own shopping and driving to [employer].  She uses a cane or a walker as needed for support.  She has oxygen at home as needed (unsure of oxygen supply company).  She sees a Oncologist as well, will see next Monday 3/27 at 9am.  She plans on going back home via family or a friend, made patient aware that [facility] is also an option if needed.  Reviewed COPD action plan with patient.  Patient confirmed understanding and states she also has a rescue inhaler Pro-air and Advair at home to use as needed for SOB.      PLAN  Patient given options and choices for discharge yes.  Patient/family is agreeable to the plan?  Yes:   Patient anticipates discharging back to [facility].        Patient anticipates needs for home equipment: No    Plan/Disposition: Home   Appointments: Follow up appt with Dr. Moreno for Tuesday March 28, at 11am at the [facility].      Care  (CTS)  will continue to follow as needed.    Paola Bloom, RN, BSN, PHN, CTS  Care Transitions Specialist  [facility]

## 2017-03-19 NOTE — PHARMACY
Consult to review meds for possible hyperkalemia: reviewed home meds and inpatient meds.  Only found PTA Naproxen: patient seems to be using bid prn. This is the only medication found that can cause hyperkalemia

## 2017-03-19 NOTE — PROGRESS NOTES
SPIRITUAL HEALTH SERVICES Progress Note  [facility]     Visited Marie per follow up request for daily  visits. Marie shared about her medical narrative and how she has a few different concerns going on at once. Much of her family is away on vacation this week but some will be returning tonight and others will be back next week. Marie shared that she had gotten very little sleep, so I kept our conversation somewhat brief. Offered prayer at Marie's request. Chaplains will continue to follow up every day while Marie is in the hospital.     Edgard Bennett  Chaplain Resident  Pager [phone]

## 2017-03-19 NOTE — PLAN OF CARE
Lab called with Critical Value of WBC of 282.1. Stated that was consistent with morning lab value. Pathologist will address value tomorrow morning. Lab draw already ordered for tomorrow as well.   Pt just transferred to Med Surg 3. Relayed critical value to Babs MONCADA with read back confirmation.

## 2017-03-19 NOTE — PROGRESS NOTES
Pt transferring to ICU due to high potassium and closer monitoring required. Report called to Korina in ICU.

## 2017-03-20 LAB
ANION GAP SERPL CALCULATED.3IONS-SCNC: 6 MMOL/L (ref 3–14)
ANISOCYTOSIS BLD QL SMEAR: SLIGHT
ANISOCYTOSIS BLD QL SMEAR: SLIGHT
BASOPHILS # BLD AUTO: 0 10E9/L (ref 0–0.2)
BASOPHILS # BLD AUTO: 0 10E9/L (ref 0–0.2)
BASOPHILS NFR BLD AUTO: 0 %
BASOPHILS NFR BLD AUTO: 0 %
BUN SERPL-MCNC: 14 MG/DL (ref 7–30)
CALCIUM SERPL-MCNC: 7.7 MG/DL (ref 8.5–10.1)
CHLORIDE SERPL-SCNC: 99 MMOL/L (ref 94–109)
CO2 SERPL-SCNC: 35 MMOL/L (ref 20–32)
COPATH REPORT: NORMAL
CREAT SERPL-MCNC: 0.77 MG/DL (ref 0.52–1.04)
DACRYOCYTES BLD QL SMEAR: SLIGHT
DIFFERENTIAL METHOD BLD: ABNORMAL
DIFFERENTIAL METHOD BLD: ABNORMAL
EOSINOPHIL # BLD AUTO: 0 10E9/L (ref 0–0.7)
EOSINOPHIL # BLD AUTO: 0 10E9/L (ref 0–0.7)
EOSINOPHIL NFR BLD AUTO: 0 %
EOSINOPHIL NFR BLD AUTO: 0 %
ERYTHROCYTE [DISTWIDTH] IN BLOOD BY AUTOMATED COUNT: 17.1 % (ref 10–15)
ERYTHROCYTE [DISTWIDTH] IN BLOOD BY AUTOMATED COUNT: 17.2 % (ref 10–15)
GFR SERPL CREATININE-BSD FRML MDRD: 71 ML/MIN/1.7M2
GLUCOSE SERPL-MCNC: 145 MG/DL (ref 70–99)
HCT VFR BLD AUTO: 32.2 % (ref 35–47)
HCT VFR BLD AUTO: 36.5 % (ref 35–47)
HGB BLD-MCNC: 11 G/DL (ref 11.7–15.7)
HGB BLD-MCNC: 9.2 G/DL (ref 11.7–15.7)
LYMPHOCYTES # BLD AUTO: 199.5 10E9/L (ref 0.8–5.3)
LYMPHOCYTES # BLD AUTO: 202.5 10E9/L (ref 0.8–5.3)
LYMPHOCYTES NFR BLD AUTO: 95 %
LYMPHOCYTES NFR BLD AUTO: 96 %
MCH RBC QN AUTO: 28.4 PG (ref 26.5–33)
MCH RBC QN AUTO: 28.7 PG (ref 26.5–33)
MCHC RBC AUTO-ENTMCNC: 28.6 G/DL (ref 31.5–36.5)
MCHC RBC AUTO-ENTMCNC: 30.1 G/DL (ref 31.5–36.5)
MCV RBC AUTO: 95 FL (ref 78–100)
MCV RBC AUTO: 99 FL (ref 78–100)
MONOCYTES # BLD AUTO: 0 10E9/L (ref 0–1.3)
MONOCYTES # BLD AUTO: 2.1 10E9/L (ref 0–1.3)
MONOCYTES NFR BLD AUTO: 0 %
MONOCYTES NFR BLD AUTO: 1 %
NEUTROPHILS # BLD AUTO: 10.7 10E9/L (ref 1.6–8.3)
NEUTROPHILS # BLD AUTO: 6.2 10E9/L (ref 1.6–8.3)
NEUTROPHILS NFR BLD AUTO: 3 %
NEUTROPHILS NFR BLD AUTO: 5 %
OVALOCYTES BLD QL SMEAR: ABNORMAL
OVALOCYTES BLD QL SMEAR: SLIGHT
PLATELET # BLD AUTO: 108 10E9/L (ref 150–450)
PLATELET # BLD AUTO: 138 10E9/L (ref 150–450)
PLATELET # BLD EST: ABNORMAL 10*3/UL
PLATELET # BLD EST: ABNORMAL 10*3/UL
POIKILOCYTOSIS BLD QL SMEAR: SLIGHT
POTASSIUM SERPL-SCNC: 3.8 MMOL/L (ref 3.4–5.3)
RBC # BLD AUTO: 3.24 10E12/L (ref 3.8–5.2)
RBC # BLD AUTO: 3.83 10E12/L (ref 3.8–5.2)
SMUDGE CELLS BLD QL SMEAR: PRESENT
SMUDGE CELLS BLD QL SMEAR: PRESENT
SODIUM SERPL-SCNC: 140 MMOL/L (ref 133–144)
WBC # BLD AUTO: 207.8 10E9/L (ref 4–11)
WBC # BLD AUTO: 213.2 10E9/L (ref 4–11)

## 2017-03-20 PROCEDURE — 25000132 ZZH RX MED GY IP 250 OP 250 PS 637: Mod: GY | Performed by: HOSPITALIST

## 2017-03-20 PROCEDURE — 80048 BASIC METABOLIC PNL TOTAL CA: CPT | Performed by: INTERNAL MEDICINE

## 2017-03-20 PROCEDURE — 85025 COMPLETE CBC W/AUTO DIFF WBC: CPT | Performed by: INTERNAL MEDICINE

## 2017-03-20 PROCEDURE — 94640 AIRWAY INHALATION TREATMENT: CPT | Mod: 76

## 2017-03-20 PROCEDURE — 99233 SBSQ HOSP IP/OBS HIGH 50: CPT | Performed by: INTERNAL MEDICINE

## 2017-03-20 PROCEDURE — 93005 ELECTROCARDIOGRAM TRACING: CPT

## 2017-03-20 PROCEDURE — A9270 NON-COVERED ITEM OR SERVICE: HCPCS | Mod: GY | Performed by: HOSPITALIST

## 2017-03-20 PROCEDURE — 93010 ELECTROCARDIOGRAM REPORT: CPT | Mod: 76 | Performed by: INTERNAL MEDICINE

## 2017-03-20 PROCEDURE — 12000007 ZZH R&B INTERMEDIATE

## 2017-03-20 PROCEDURE — 94640 AIRWAY INHALATION TREATMENT: CPT

## 2017-03-20 PROCEDURE — 36415 COLL VENOUS BLD VENIPUNCTURE: CPT | Performed by: INTERNAL MEDICINE

## 2017-03-20 PROCEDURE — 25000125 ZZHC RX 250: Performed by: HOSPITALIST

## 2017-03-20 PROCEDURE — 25000128 H RX IP 250 OP 636: Performed by: INTERNAL MEDICINE

## 2017-03-20 PROCEDURE — 25000128 H RX IP 250 OP 636: Performed by: HOSPITALIST

## 2017-03-20 PROCEDURE — 40000275 ZZH STATISTIC RCP TIME EA 10 MIN

## 2017-03-20 RX ORDER — METHYLPREDNISOLONE SODIUM SUCCINATE 125 MG/2ML
60 INJECTION, POWDER, LYOPHILIZED, FOR SOLUTION INTRAMUSCULAR; INTRAVENOUS 2 TIMES DAILY
Status: DISCONTINUED | OUTPATIENT
Start: 2017-03-20 | End: 2017-03-22

## 2017-03-20 RX ADMIN — CEFTRIAXONE 1 G: 1 INJECTION, POWDER, FOR SOLUTION INTRAMUSCULAR; INTRAVENOUS at 17:57

## 2017-03-20 RX ADMIN — IPRATROPIUM BROMIDE AND ALBUTEROL SULFATE 3 ML: .5; 3 SOLUTION RESPIRATORY (INHALATION) at 11:22

## 2017-03-20 RX ADMIN — ZOLPIDEM TARTRATE 5 MG: 5 TABLET, FILM COATED ORAL at 22:34

## 2017-03-20 RX ADMIN — METHYLPREDNISOLONE SODIUM SUCCINATE 62.5 MG: 125 INJECTION, POWDER, FOR SOLUTION INTRAMUSCULAR; INTRAVENOUS at 18:40

## 2017-03-20 RX ADMIN — METHYLPREDNISOLONE SODIUM SUCCINATE 62.5 MG: 125 INJECTION, POWDER, FOR SOLUTION INTRAMUSCULAR; INTRAVENOUS at 10:48

## 2017-03-20 RX ADMIN — GUAIFENESIN 600 MG: 600 TABLET, EXTENDED RELEASE ORAL at 20:47

## 2017-03-20 RX ADMIN — FUROSEMIDE 20 MG: 20 TABLET ORAL at 08:20

## 2017-03-20 RX ADMIN — GUAIFENESIN 600 MG: 600 TABLET, EXTENDED RELEASE ORAL at 08:20

## 2017-03-20 RX ADMIN — METOPROLOL SUCCINATE 25 MG: 25 TABLET, EXTENDED RELEASE ORAL at 08:20

## 2017-03-20 RX ADMIN — IPRATROPIUM BROMIDE AND ALBUTEROL SULFATE 3 ML: .5; 3 SOLUTION RESPIRATORY (INHALATION) at 15:31

## 2017-03-20 RX ADMIN — IPRATROPIUM BROMIDE AND ALBUTEROL SULFATE 3 ML: .5; 3 SOLUTION RESPIRATORY (INHALATION) at 20:24

## 2017-03-20 RX ADMIN — IPRATROPIUM BROMIDE AND ALBUTEROL SULFATE 3 ML: .5; 3 SOLUTION RESPIRATORY (INHALATION) at 07:41

## 2017-03-20 RX ADMIN — FLUTICASONE FUROATE AND VILANTEROL TRIFENATATE 1 PUFF: 100; 25 POWDER RESPIRATORY (INHALATION) at 07:44

## 2017-03-20 RX ADMIN — METHYLPREDNISOLONE SODIUM SUCCINATE 40 MG: 40 INJECTION, POWDER, FOR SOLUTION INTRAMUSCULAR; INTRAVENOUS at 06:18

## 2017-03-20 RX ADMIN — LEVOTHYROXINE SODIUM 75 MCG: 75 TABLET ORAL at 08:20

## 2017-03-20 RX ADMIN — DIPHENHYDRAMINE HYDROCHLORIDE 25 MG: 25 CAPSULE ORAL at 22:34

## 2017-03-20 RX ADMIN — ATORVASTATIN CALCIUM 20 MG: 20 TABLET, FILM COATED ORAL at 08:20

## 2017-03-20 NOTE — PLAN OF CARE
Problem: Goal Outcome Summary  Goal: Goal Outcome Summary  Outcome: No Change  A&Ox4, VSS. 94-96% 4LPM. Tele SR with 1st degree AVB. IV solumedrol q6. Lungs diminished, coarse, ex. Wheezes. BENJAMIN. No BM this shift. A1 with cares. Slept well overnight.

## 2017-03-20 NOTE — PLAN OF CARE
Problem: Goal Outcome Summary  Goal: Goal Outcome Summary  Outcome: No Change  Verbalizing her feelings of being overwhelmed. Discussed her improvements and not needing ICU care. Given ambien at . Sleeping now.

## 2017-03-20 NOTE — PROGRESS NOTES
"[facility]  Hospitalist Progress Note  Fide Wilkes MD 03/20/2017    Reason for Stay (Diagnosis): Acute hypoxic respiratory failure         Assessment and Plan:      Summary of Stay: Marie Kline is a 84 year old female with a history of COPD (not on home O2-but does have a concentrator at home), \"CHF\" with normal echo during this admission (no dd or systolic dysfunction), CAD, CLL with severe leukocytosis (baseline is 160-180 range) admitted on 3/17/2017 with acute hypoxic respiratory failure 2/2 COPD exacerbation.     3 days pta she was seen by her primary MD and initiated on azith for suspected bronchitis. Her admission CXR is without e/o infiltrate but notable for central emphysematous changes. Influenza agn negative.  She remains afebrile.  procal not completed, likely due to pta abx. There is no clinical e/o volume overload and echo shows normal cardiac function, and bnp is not elevated.     Given her chronic CLL and concern for possible PE V/Q testing was completed and of indeterminate probability in light of her underlying emphysema so b LE dopplers completed and negative for DVT.  Her admission WBC notable for being in the 200-250 range (which is not unusual for her in the past)  There was also some concern of melena but guiac testing is negative    Her brief hospitalization has been complicated by hyperkalemia into the 6.5 range, necessitating temporary  transfer to the ICU for K shifting medications, and kayexalate for body elimination.  Problem List:   1. Acute hypoxic respiratory failure:  COPD with exacerbation presumed.  VQ completed yesterday indeterminate for PE, but echo without e/o RV overload or pulmonary hypertension, will check dopplers for completeness sake-but I doubt this is PE.  D-dimer will not be helpful in this situation in light of suspected active infection   2. Acute COPD with exacerbation-? Due to viral issue vs bronchitis vs environmental irritant.  Initially on  " "methylpred 40 mg IV q8-given the substantially elevated WBC tapered back to 40 mg IV bid on 3/20/19.  rec'd 6 days of azith for possible bacterial component  3. COPD:  home regimen is :  flutic-salmeterol 250-50, prn albuterol.  I see no anticholinergic agent  4. CLL:  With severe leukocytosis-243-not uncommon for her per her report and in discussion with Dr. Ivory. ? Tumor lysis syndrome causing hyperkalemia?-WBC and K better today  5. Hyperkalemia:  ? Due to leukocytosis-I see no other reason, renal function is wnl/no culprit meds id'd.  Treated with IVF/Insulin-glucose/calcium for K shifting, and furosemide/kayexalate for K removal.    6. CAD:  Cont statin/BB, but I see NO antiplatelet agent  7. Htn:  On metop xl 25 mg and furosemide 20 mg  DVT Prophylaxis: enox dc'd in light of ? Melena, now will hold in light of hyperkalemia (theorhetical issue but known to occur with heparin)-cont PCDs-if WBC trending back to baseline and K remains wnl will start sq AC tomorrow  Code Status: Full Code  Discharge Dispo: Unclear  Estimated Disch Date / # of Days until Disch: 3-5 ?? Maybe more        Interval History (Subjective):      Still feeling overall poorly, still coughing which is interrupting her sleep but otherwise ok.  No cp - breathing is still poor, no n/v po intake is poor (likely due to sob).                    Physical Exam:      Last Vital Signs:  /78 (BP Location: Right arm)  Pulse 80  Temp 98.6  F (37  C) (Oral)  Resp 18  Ht 1.473 m (4' 10\")  Wt 49.1 kg (108 lb 4.8 oz)  SpO2 92%  BMI 22.63 kg/m2    I/O of questionable accuracy  Tele:  NSR    Pleasant nad looks stated age head nc/at sclera clear lungs moderate air movement at best, scattered wheeze persists.  RRR no m/r/g no le edema skin w/d no c/c abd s/nt/nd alert and oriented affect appropriate fernández            Medications:      All current medications were reviewed with changes reflected in problem list.         Data:      All new lab and imaging data " was reviewed.   Labs:    Recent Labs  Lab 03/20/17  0554      POTASSIUM 3.8   CHLORIDE 99   CO2 35*   ANIONGAP 6   *   BUN 14   CR 0.77   GFRESTIMATED 71   GFRESTBLACK 86   CARLOS 7.7*       Recent Labs  Lab 03/20/17  0554   .2*   HGB 9.2*   HCT 32.2*   MCV 99   *      Imaging:   B LE dopplers 3/19/17       FINDINGS: Negative. No evidence for DVT. Short term followup  recommended if symptoms persist.

## 2017-03-20 NOTE — PROGRESS NOTES
Oncology/Hematology Follow Up Note:    Assessment and Plan:  Admitted, with , shortness of breath, low pulse ox on 3/17/17    CLL Montero Stage III diagnosed in 2007 with lymphocytosis, anemia and thrombocytopenia  Has not been on any cytotoxic treatment  - was on IVIG every month for hypogammaglobulinemia  - her WBC has been in the range of 160-180 k  - rest of labs have been stable.  - has never been on treatment, besides IVIG.  - can revisit this discussion, based on status.  - updated her on numerous options available for this, with less toxicity.  - I will send PBS, to rule out any change ( pending).      Leucocytosis  - stress/ infection/ steroids, along with CLL  - check LDH and uric acid( LDH only mildly elevated).  - WBC declining down today.  - has FU with me next Monday and will  contiue with close FU.      Pulmonary  - VQ scan intermediate  - Agree with holding off on heparin, LE dopplers negative.  - on IV antibiotics and IV steroids.  - count not much above baseline - hence don't think leukostasis.  - also with CLL, typically leukostasis not seen unless closer to 400k.  - no criteria for leukapheresis.      Increased K  - declined, stable.  - on kayexalate        Code  - full  Subjective:    Feeling much better, eating well, no pain.      Labs:  All labs reviewed    CBC  Recent Labs  Lab 03/20/17  0554 03/19/17  1609 03/19/17  0530   .2* 282.1* 243.6*   HGB 9.2* 9.6* 9.8*   MCV 99 99 98   * 130* 129*       CMP  Recent Labs  Lab 03/20/17  0554 03/19/17  2055 03/19/17  1609 03/19/17  1007 03/19/17  0530 03/19/17  0300 03/18/17 2002 03/18/17  0632 03/17/17  0515     --   --   --  139  --  133  --  134 136   POTASSIUM 3.8 5.3 5.5* 5.0 4.3 6.3* 6.5*  < > 5.5* 4.7   CHLORIDE 99  --   --   --  100  --  98  --  99 100   CO2 35*  --   --   --  32  --  29  --  30 34*   ANIONGAP 6  --   --   --  7  --  6  --  5 2*   *  --   --   --  186* 144* 132*  --  143* 148*   BUN 14  --   --   --  18   --  20  --  15 12   CR 0.77  --   --   --  0.82  --  0.89  --  0.86 1.01   GFRESTIMATED 71  --   --   --  66  --  60*  --  63 52*   GFRESTBLACK 86  --   --   --  80  --  73  --  76 63   CARLOS 7.7*  --   --   --  8.4*  --  8.7  --  8.1* 8.2*   PROTTOTAL  --   --   --   --   --   --   --   --   --  6.8   ALBUMIN  --   --   --   --   --   --   --   --   --  3.6   BILITOTAL  --   --   --   --   --   --   --   --   --  0.5   ALKPHOS  --   --   --   --   --   --   --   --   --  151*   AST  --   --   --   --   --   --   --   --   --  33   ALT  --   --   --   --   --   --   --   --   --  20   < > = values in this interval not displayed.    INRNo lab results found in last 7 days.    Blood CultureNo results for input(s): CULT in the last 168 hours.      Isabel Ivory MD  [facility]  3/20/2017 8:48 AM

## 2017-03-20 NOTE — PROGRESS NOTES
SPIRITUAL HEALTH SERVICES Progress Note  [facility] Med. Surg. 3    Attempted to see pt Marie this afternoon per her request for further  support.  She was sleeping.  I or the unit  will follow up tomorrow.    Pelon Zamarripa M.Div., [facility]  Staff   Pager [phone]

## 2017-03-20 NOTE — PROVIDER NOTIFICATION
WBC @ 2964=315. lab called, consistent with AM draw. lab draw in AM. Patho will address in AM. Thanks  Sent to admitting pager via web based page and charge nurse notified.

## 2017-03-20 NOTE — PLAN OF CARE
Problem: Goal Outcome Summary  Goal: Goal Outcome Summary  A/O x4. [place]. SBA in room. Lung sounds dim. Tele: SR with 1st degree AVB. Tolerating regular diet. Denies pain.

## 2017-03-21 LAB
ANION GAP SERPL CALCULATED.3IONS-SCNC: 5 MMOL/L (ref 3–14)
ANISOCYTOSIS BLD QL SMEAR: SLIGHT
BASOPHILS # BLD AUTO: 0 10E9/L (ref 0–0.2)
BASOPHILS NFR BLD AUTO: 0 %
BUN SERPL-MCNC: 14 MG/DL (ref 7–30)
CALCIUM SERPL-MCNC: 7.8 MG/DL (ref 8.5–10.1)
CHLORIDE SERPL-SCNC: 99 MMOL/L (ref 94–109)
CO2 SERPL-SCNC: 35 MMOL/L (ref 20–32)
CREAT SERPL-MCNC: 0.71 MG/DL (ref 0.52–1.04)
DIFFERENTIAL METHOD BLD: ABNORMAL
EOSINOPHIL # BLD AUTO: 0 10E9/L (ref 0–0.7)
EOSINOPHIL NFR BLD AUTO: 0 %
ERYTHROCYTE [DISTWIDTH] IN BLOOD BY AUTOMATED COUNT: 17.6 % (ref 10–15)
GFR SERPL CREATININE-BSD FRML MDRD: 79 ML/MIN/1.7M2
GLUCOSE SERPL-MCNC: 110 MG/DL (ref 70–99)
HCT VFR BLD AUTO: 31.5 % (ref 35–47)
HGB BLD-MCNC: 8.9 G/DL (ref 11.7–15.7)
INTERPRETATION ECG - MUSE: NORMAL
INTERPRETATION ECG - MUSE: NORMAL
LYMPHOCYTES # BLD AUTO: 218.4 10E9/L (ref 0.8–5.3)
LYMPHOCYTES NFR BLD AUTO: 96 %
MCH RBC QN AUTO: 28.1 PG (ref 26.5–33)
MCHC RBC AUTO-ENTMCNC: 28.3 G/DL (ref 31.5–36.5)
MCV RBC AUTO: 99 FL (ref 78–100)
MONOCYTES # BLD AUTO: 0 10E9/L (ref 0–1.3)
MONOCYTES NFR BLD AUTO: 0 %
NEUTROPHILS # BLD AUTO: 9.1 10E9/L (ref 1.6–8.3)
NEUTROPHILS NFR BLD AUTO: 4 %
OVALOCYTES BLD QL SMEAR: ABNORMAL
PLATELET # BLD AUTO: 105 10E9/L (ref 150–450)
PLATELET # BLD EST: ABNORMAL 10*3/UL
POIKILOCYTOSIS BLD QL SMEAR: SLIGHT
POTASSIUM SERPL-SCNC: 4.9 MMOL/L (ref 3.4–5.3)
RBC # BLD AUTO: 3.17 10E12/L (ref 3.8–5.2)
SODIUM SERPL-SCNC: 139 MMOL/L (ref 133–144)
WBC # BLD AUTO: 227.5 10E9/L (ref 4–11)

## 2017-03-21 PROCEDURE — 12000007 ZZH R&B INTERMEDIATE

## 2017-03-21 PROCEDURE — 40000274 ZZH STATISTIC RCP CONSULT EA 30 MIN

## 2017-03-21 PROCEDURE — 94640 AIRWAY INHALATION TREATMENT: CPT | Mod: 76

## 2017-03-21 PROCEDURE — 25000132 ZZH RX MED GY IP 250 OP 250 PS 637: Mod: GY | Performed by: HOSPITALIST

## 2017-03-21 PROCEDURE — 25000128 H RX IP 250 OP 636: Performed by: INTERNAL MEDICINE

## 2017-03-21 PROCEDURE — 99233 SBSQ HOSP IP/OBS HIGH 50: CPT | Performed by: INTERNAL MEDICINE

## 2017-03-21 PROCEDURE — 80048 BASIC METABOLIC PNL TOTAL CA: CPT | Performed by: INTERNAL MEDICINE

## 2017-03-21 PROCEDURE — 94640 AIRWAY INHALATION TREATMENT: CPT

## 2017-03-21 PROCEDURE — 40000275 ZZH STATISTIC RCP TIME EA 10 MIN

## 2017-03-21 PROCEDURE — 25000125 ZZHC RX 250: Performed by: HOSPITALIST

## 2017-03-21 PROCEDURE — A9270 NON-COVERED ITEM OR SERVICE: HCPCS | Mod: GY | Performed by: HOSPITALIST

## 2017-03-21 PROCEDURE — 85025 COMPLETE CBC W/AUTO DIFF WBC: CPT | Performed by: INTERNAL MEDICINE

## 2017-03-21 PROCEDURE — 36415 COLL VENOUS BLD VENIPUNCTURE: CPT | Performed by: INTERNAL MEDICINE

## 2017-03-21 RX ADMIN — IPRATROPIUM BROMIDE AND ALBUTEROL SULFATE 3 ML: .5; 3 SOLUTION RESPIRATORY (INHALATION) at 07:21

## 2017-03-21 RX ADMIN — LEVOTHYROXINE SODIUM 75 MCG: 75 TABLET ORAL at 08:03

## 2017-03-21 RX ADMIN — IPRATROPIUM BROMIDE AND ALBUTEROL SULFATE 3 ML: .5; 3 SOLUTION RESPIRATORY (INHALATION) at 15:30

## 2017-03-21 RX ADMIN — FUROSEMIDE 20 MG: 20 TABLET ORAL at 08:03

## 2017-03-21 RX ADMIN — GUAIFENESIN 600 MG: 600 TABLET, EXTENDED RELEASE ORAL at 08:03

## 2017-03-21 RX ADMIN — IPRATROPIUM BROMIDE AND ALBUTEROL SULFATE 3 ML: .5; 3 SOLUTION RESPIRATORY (INHALATION) at 12:08

## 2017-03-21 RX ADMIN — ATORVASTATIN CALCIUM 20 MG: 20 TABLET, FILM COATED ORAL at 08:03

## 2017-03-21 RX ADMIN — METHYLPREDNISOLONE SODIUM SUCCINATE 62.5 MG: 125 INJECTION, POWDER, FOR SOLUTION INTRAMUSCULAR; INTRAVENOUS at 20:55

## 2017-03-21 RX ADMIN — CEFTRIAXONE 1 G: 1 INJECTION, POWDER, FOR SOLUTION INTRAMUSCULAR; INTRAVENOUS at 17:59

## 2017-03-21 RX ADMIN — DIPHENHYDRAMINE HYDROCHLORIDE 25 MG: 25 CAPSULE ORAL at 21:59

## 2017-03-21 RX ADMIN — METHYLPREDNISOLONE SODIUM SUCCINATE 62.5 MG: 125 INJECTION, POWDER, FOR SOLUTION INTRAMUSCULAR; INTRAVENOUS at 10:05

## 2017-03-21 RX ADMIN — IPRATROPIUM BROMIDE AND ALBUTEROL SULFATE 3 ML: .5; 3 SOLUTION RESPIRATORY (INHALATION) at 19:23

## 2017-03-21 RX ADMIN — ZOLPIDEM TARTRATE 5 MG: 5 TABLET, FILM COATED ORAL at 21:59

## 2017-03-21 RX ADMIN — GUAIFENESIN 600 MG: 600 TABLET, EXTENDED RELEASE ORAL at 20:55

## 2017-03-21 RX ADMIN — FLUTICASONE FUROATE AND VILANTEROL TRIFENATATE 1 PUFF: 100; 25 POWDER RESPIRATORY (INHALATION) at 07:21

## 2017-03-21 RX ADMIN — METOPROLOL SUCCINATE 25 MG: 25 TABLET, EXTENDED RELEASE ORAL at 08:03

## 2017-03-21 NOTE — PLAN OF CARE
Problem: Goal Outcome Summary  Goal: Goal Outcome Summary  Outcome: No Change  Pt A&Ox4, VSS, denies pain, LS diminished, BS audible - BM 3/20. STAT EKG done - results pending. Tele - SR 1AVB multifocal PVCs and PACs, and Afib per tele tech.

## 2017-03-21 NOTE — PROGRESS NOTES
"[facility]     Date: 3-21-17    Admission Dx: COPD Exac    Pulmonary History COPD    Home Nebulizer/MDI Use: Advair BID, Albuterol Q6 prn, Albuterol Q6 prn    Home Oxygen: As needed    Acuity Level (RCAT flow sheet): Acuity Level 3    Aerosol Therapy initiated: Duoneb QID      Pulmonary Hygiene initiated: Coughing and Deep breathing Techniques      Volume Expansion initiated: IS      Current Oxygen Requirements: 4L    Current SpO2: 94%    Re-evaluation date: 3-24-17    Patient Education: Patient is aware of medications and side effects of them.      See \"RT Assessments\" flow sheet for patient assessment scoring and Acuity Level Details.             "

## 2017-03-21 NOTE — PROGRESS NOTES
Oncology/Hematology Follow Up Note:    Assessment and Plan:    Admitted, with , shortness of breath, low pulse ox on 3/17/17     CLL Montero Stage III diagnosed in 2007 with lymphocytosis, anemia and thrombocytopenia  Has not been on any cytotoxic treatment  - was on IVIG every month for hypogammaglobulinemia  - her WBC has been in the range of 160-180 k  - rest of labs have been stable.  - has never been on treatment, besides IVIG.  - can revisit this discussion, based on status.  - updated her on numerous options available for this, with less toxicity. She is open to this, will discuss ongoing.  - I will send PBS, to rule out any change - no change, all CLL.      Leucocytosis  - stress/ infection/ steroids, along with CLL  - check LDH and uric acid( LDH only mildly elevated).  - WBC declining down , slightly up today.  - has FU with me next Monday and will contiue with close FU.      Pulmonary  - VQ scan intermediate  - Agree with holding off on IV therapeutic heparin, LE dopplers negative.  - on IV antibiotics and IV steroids.  - count not much above baseline - hence don't think leukostasis.  - also with CLL, typically leukostasis not seen unless closer to 400k.  - no criteria for leukapheresis.      Increased K  - declined, stable.  - on kayexalate    Dark stool  - per Marie has resolved.    Anemia and thrombocytopenia  - from CLL  - observe.         Code  - full    Subjective:    Feeling about the same, breathing about the same.    Scheduled Medications:  Reviewed active medications    Labs:  CBC RESULTS:   Recent Labs   Lab Test  03/21/17   0631  03/20/17   0554  03/19/17   1609   WBC  227.5*  213.2*  282.1*   HGB  8.9*  9.2*  9.6*   HCT  31.5*  32.2*  35.1   MCV  99  99  99   PLT  105*  108*  130*       CMP  Recent Labs  Lab 03/21/17  0631 03/20/17  0554 03/19/17 2055 03/19/17  1609  03/19/17  0530 03/19/17  0300 03/18/17 2002 03/17/17  0515    140  --   --   --  139  --  133  < > 136   POTASSIUM 4.9 3.8  "5.3 5.5*  < > 4.3 6.3* 6.5*  < > 4.7   CHLORIDE 99 99  --   --   --  100  --  98  < > 100   CO2 35* 35*  --   --   --  32  --  29  < > 34*   ANIONGAP 5 6  --   --   --  7  --  6  < > 2*   * 145*  --   --   --  186* 144* 132*  < > 148*   BUN 14 14  --   --   --  18  --  20  < > 12   CR 0.71 0.77  --   --   --  0.82  --  0.89  < > 1.01   GFRESTIMATED 79 71  --   --   --  66  --  60*  < > 52*   GFRESTBLACK >90African American GFR Calc 86  --   --   --  80  --  73  < > 63   CARLOS 7.8* 7.7*  --   --   --  8.4*  --  8.7  < > 8.2*   PROTTOTAL  --   --   --   --   --   --   --   --   --  6.8   ALBUMIN  --   --   --   --   --   --   --   --   --  3.6   BILITOTAL  --   --   --   --   --   --   --   --   --  0.5   ALKPHOS  --   --   --   --   --   --   --   --   --  151*   AST  --   --   --   --   --   --   --   --   --  33   ALT  --   --   --   --   --   --   --   --   --  20   < > = values in this interval not displayed.    INRNo lab results found in last 7 days.    Objective/Physical Exam:  Blood pressure 140/71, pulse 80, temperature 97.9  F (36.6  C), temperature source Oral, resp. rate 16, height 1.473 m (4' 10\"), weight 48.9 kg (107 lb 14.4 oz), SpO2 91 %, not currently breastfeeding.  General appearance:alert, oriented, no acute distress  HEENT:sclera anicteric, no pallor, no thrush or mucositis.  Lungs: chest is clear to auscultation, no rales or rhonchi appreciated.  Abdomen: soft, NT, ND , BS normal  Ext: no edema or rashes    Isabel Ivory MD  [facility]  3/21/2017 9:32 AM      "

## 2017-03-21 NOTE — PROGRESS NOTES
"[facility]  Hospitalist Progress Note  Fide Wilkes MD 03/21/2017    Reason for Stay (Diagnosis): Acute hypoxic respiratory failure         Assessment and Plan:      Summary of Stay: Marie Kline is a 84 year old female with a history of COPD (not on home O2-but does have a concentrator at home), \"CHF\" with normal echo during this admission (no dd or systolic dysfunction), CAD, CLL with severe leukocytosis (baseline is 160-180 range) admitted on 3/17/2017 with acute hypoxic respiratory failure 2/2 COPD exacerbation.     3 days pta she was seen by her primary MD and initiated on azith for suspected bronchitis. Her admission CXR is without e/o infiltrate but notable for central emphysematous changes. Influenza agn negative.  She remains afebrile.  procal not completed, likely due to pta abx. There is no clinical e/o volume overload and echo shows normal cardiac function, and bnp is not elevated.     Given her chronic CLL and concern for possible PE V/Q testing was completed and of indeterminate probability in light of her underlying emphysema so b LE dopplers completed and negative for DVT.  Her WBC notable for being in the 200-250 range (which is not unusual for her in the past during acute medical illnesses)  There was also some concern of melena but guiac testing is negative    Her brief hospitalization has been complicated by hyperkalemia into the 6.5 range, necessitating temporary  transfer to the ICU for K shifting medications, and kayexalate for body elimination. Likely due to transient tumor lysis, now stabilized.    She has been slow to improve stating that today (3/17) first day that she feels like her breathing is improving. Of note baseline RA sats at rest are 90% per her oximeter  Problem List:   1. Acute hypoxic respiratory failure:  COPD with exacerbation presumed.  VQ completed yesterday indeterminate for PE, but echo without e/o RV overload or pulmonary hypertension, dopplers negative I " "doubt this is PE.  D-dimer will not be helpful in this situation in light of suspected active infection   2. Acute COPD with exacerbation-? Due to viral issue vs bronchitis vs environmental irritant.  Initially on  methylpred 40 mg IV q8-given the substantially elevated WBC tapered back to 40 mg IV bid on 3/20/19.  rec'd 6 days of azith for possible bacterial component-finally turning the corner today.   3. COPD:  home regimen is :  flutic-salmeterol 250-50, prn albuterol.  I see no anticholinergic agent  4. CLL:  With severe leukocytosis-243-not uncommon for her per her report and in discussion with Dr. Ivory. ? Tumor lysis syndrome causing hyperkalemia?-WBC and K better today  5. Hyperkalemia: 2/2 tumor lysis-I see no other reason, renal function is wnl/no culprit meds id'd.  Treated with IVF/Insulin-glucose/calcium for K shifting, and furosemide/kayexalate for K removal.  Now resolved  6. CAD:  Cont statin/BB, but I see NO antiplatelet agent  7. Htn:  On metop xl 25 mg and furosemide 20 mg  DVT Prophylaxis: enox dc'd in light of ? Melena, then held in light of hyperkalemia (theorhetical issue but known to occur with heparin)-cont PCDs-if WBC trending back to baseline and K remains wnl will start sq AC tomorrow  Code Status: Full Code  Discharge Dispo: Unclear  Estimated Disch Date / # of Days until Disch: 3-5 ?? Maybe more        Interval History (Subjective):      Still coughing but breathing is better today. No cp, po intake is fair. no                  Physical Exam:      Last Vital Signs:  /78 (BP Location: Right arm)  Pulse 80  Temp 99.2  F (37.3  C) (Oral)  Resp 16  Ht 1.473 m (4' 10\")  Wt 48.9 kg (107 lb 14.4 oz)  SpO2 (!) 86%  BMI 22.55 kg/m2    I/O of questionable accuracy  Tele:  NSR-NO AFIB on review of tele strips    Pleasant nad looks stated age head nc/at sclera clear lungs moderate air movement at best, scattered wheeze persists.  RRR no m/r/g no le edema skin w/d no c/c abd s/nt/nd alert and " oriented affect appropriate fernández            Medications:      All current medications were reviewed with changes reflected in problem list.         Data:      All new lab and imaging data was reviewed.   Labs:    Recent Labs  Lab 03/21/17  0631      POTASSIUM 4.9   CHLORIDE 99   CO2 35*   ANIONGAP 5   *   BUN 14   CR 0.71   GFRESTIMATED 79   GFRESTBLACK >90African American GFR Calc   CARLOS 7.8*       Recent Labs  Lab 03/21/17  0631   .5*   HGB 8.9*   HCT 31.5*   MCV 99   *      Imaging:   B LE dopplers 3/19/17       FINDINGS: Negative. No evidence for DVT. Short term followup  recommended if symptoms persist.

## 2017-03-21 NOTE — PLAN OF CARE
"Problem: Goal Outcome Summary  Goal: Goal Outcome Summary  A/O x4. SBA in room. LS: coarse, 90% on 2LPM. Tele: SR. Tolerating regular diet. PIV saline locked. Denies pain. Reports that she is \"feeling better today\". Plan to d/c to [facility] in 2 days.      "

## 2017-03-21 NOTE — PLAN OF CARE
Problem: Goal Outcome Summary  Goal: Goal Outcome Summary  Outcome: No Change  A&Ox4, VSS. 93--97% 4LPM, Unsuccessfully attempted to wean to 3LPM. Tele SR.. Lungs diminished and Coarse. SBA with cares. Slept well overnight.

## 2017-03-22 LAB
ALBUMIN SERPL-MCNC: 2.8 G/DL (ref 3.4–5)
ALP SERPL-CCNC: 121 U/L (ref 40–150)
ALT SERPL W P-5'-P-CCNC: 24 U/L (ref 0–50)
ANION GAP SERPL CALCULATED.3IONS-SCNC: 5 MMOL/L (ref 3–14)
ANISOCYTOSIS BLD QL SMEAR: ABNORMAL
AST SERPL W P-5'-P-CCNC: 38 U/L (ref 0–45)
BASOPHILS # BLD AUTO: 0 10E9/L (ref 0–0.2)
BASOPHILS NFR BLD AUTO: 0 %
BILIRUB SERPL-MCNC: 0.4 MG/DL (ref 0.2–1.3)
BUN SERPL-MCNC: 18 MG/DL (ref 7–30)
CALCIUM SERPL-MCNC: 7.9 MG/DL (ref 8.5–10.1)
CHLORIDE SERPL-SCNC: 97 MMOL/L (ref 94–109)
CO2 SERPL-SCNC: 35 MMOL/L (ref 20–32)
CREAT SERPL-MCNC: 0.71 MG/DL (ref 0.52–1.04)
DIFFERENTIAL METHOD BLD: ABNORMAL
EOSINOPHIL # BLD AUTO: 0 10E9/L (ref 0–0.7)
EOSINOPHIL NFR BLD AUTO: 0 %
ERYTHROCYTE [DISTWIDTH] IN BLOOD BY AUTOMATED COUNT: 18.3 % (ref 10–15)
GFR SERPL CREATININE-BSD FRML MDRD: 79 ML/MIN/1.7M2
GLUCOSE BLDC GLUCOMTR-MCNC: 126 MG/DL (ref 70–99)
GLUCOSE BLDC GLUCOMTR-MCNC: 153 MG/DL (ref 70–99)
GLUCOSE BLDC GLUCOMTR-MCNC: 98 MG/DL (ref 70–99)
GLUCOSE SERPL-MCNC: 121 MG/DL (ref 70–99)
HCT VFR BLD AUTO: 32.5 % (ref 35–47)
HGB BLD-MCNC: 9.1 G/DL (ref 11.7–15.7)
INTERPRETATION ECG - MUSE: NORMAL
INTERPRETATION ECG - MUSE: NORMAL
LDH SERPL L TO P-CCNC: 325 U/L (ref 81–234)
LYMPHOCYTES # BLD AUTO: 238.5 10E9/L (ref 0.8–5.3)
LYMPHOCYTES NFR BLD AUTO: 94 %
MCH RBC QN AUTO: 27.9 PG (ref 26.5–33)
MCHC RBC AUTO-ENTMCNC: 28 G/DL (ref 31.5–36.5)
MCV RBC AUTO: 100 FL (ref 78–100)
MONOCYTES # BLD AUTO: 5.1 10E9/L (ref 0–1.3)
MONOCYTES NFR BLD AUTO: 2 %
NEUTROPHILS # BLD AUTO: 10.1 10E9/L (ref 1.6–8.3)
NEUTROPHILS NFR BLD AUTO: 4 %
OVALOCYTES BLD QL SMEAR: SLIGHT
PLATELET # BLD AUTO: 113 10E9/L (ref 150–450)
PLATELET # BLD EST: ABNORMAL 10*3/UL
POIKILOCYTOSIS BLD QL SMEAR: SLIGHT
POTASSIUM SERPL-SCNC: 2.7 MMOL/L (ref 3.4–5.3)
POTASSIUM SERPL-SCNC: 5.5 MMOL/L (ref 3.4–5.3)
POTASSIUM SERPL-SCNC: 5.8 MMOL/L (ref 3.4–5.3)
POTASSIUM SERPL-SCNC: 6.1 MMOL/L (ref 3.4–5.3)
POTASSIUM SERPL-SCNC: 6.7 MMOL/L (ref 3.4–5.3)
POTASSIUM SERPL-SCNC: 7.1 MMOL/L (ref 3.4–5.3)
PROT SERPL-MCNC: 5.3 G/DL (ref 6.8–8.8)
RBC # BLD AUTO: 3.26 10E12/L (ref 3.8–5.2)
SODIUM SERPL-SCNC: 137 MMOL/L (ref 133–144)
URATE SERPL-MCNC: 4.6 MG/DL (ref 2.6–6)
WBC # BLD AUTO: 253.7 10E9/L (ref 4–11)

## 2017-03-22 PROCEDURE — 40000275 ZZH STATISTIC RCP TIME EA 10 MIN

## 2017-03-22 PROCEDURE — 25000128 H RX IP 250 OP 636: Performed by: INTERNAL MEDICINE

## 2017-03-22 PROCEDURE — 94640 AIRWAY INHALATION TREATMENT: CPT | Mod: 76

## 2017-03-22 PROCEDURE — 99233 SBSQ HOSP IP/OBS HIGH 50: CPT | Performed by: INTERNAL MEDICINE

## 2017-03-22 PROCEDURE — 84550 ASSAY OF BLOOD/URIC ACID: CPT | Performed by: INTERNAL MEDICINE

## 2017-03-22 PROCEDURE — 84132 ASSAY OF SERUM POTASSIUM: CPT | Performed by: INTERNAL MEDICINE

## 2017-03-22 PROCEDURE — 27210195 ZZH KIT POWER PICC DOUBLE LUMEN

## 2017-03-22 PROCEDURE — 93005 ELECTROCARDIOGRAM TRACING: CPT

## 2017-03-22 PROCEDURE — 25000132 ZZH RX MED GY IP 250 OP 250 PS 637: Mod: GY | Performed by: HOSPITALIST

## 2017-03-22 PROCEDURE — 00000146 ZZHCL STATISTIC GLUCOSE BY METER IP

## 2017-03-22 PROCEDURE — 94640 AIRWAY INHALATION TREATMENT: CPT

## 2017-03-22 PROCEDURE — 36569 INSJ PICC 5 YR+ W/O IMAGING: CPT

## 2017-03-22 PROCEDURE — 80053 COMPREHEN METABOLIC PANEL: CPT | Performed by: INTERNAL MEDICINE

## 2017-03-22 PROCEDURE — A9270 NON-COVERED ITEM OR SERVICE: HCPCS | Mod: GY | Performed by: INTERNAL MEDICINE

## 2017-03-22 PROCEDURE — 36415 COLL VENOUS BLD VENIPUNCTURE: CPT | Performed by: HOSPITALIST

## 2017-03-22 PROCEDURE — 12000007 ZZH R&B INTERMEDIATE

## 2017-03-22 PROCEDURE — 25000132 ZZH RX MED GY IP 250 OP 250 PS 637: Mod: GY | Performed by: INTERNAL MEDICINE

## 2017-03-22 PROCEDURE — 84132 ASSAY OF SERUM POTASSIUM: CPT | Performed by: HOSPITALIST

## 2017-03-22 PROCEDURE — 83615 LACTATE (LD) (LDH) ENZYME: CPT | Performed by: INTERNAL MEDICINE

## 2017-03-22 PROCEDURE — 25000125 ZZHC RX 250: Performed by: INTERNAL MEDICINE

## 2017-03-22 PROCEDURE — 85025 COMPLETE CBC W/AUTO DIFF WBC: CPT | Performed by: INTERNAL MEDICINE

## 2017-03-22 PROCEDURE — A9270 NON-COVERED ITEM OR SERVICE: HCPCS | Mod: GY | Performed by: HOSPITALIST

## 2017-03-22 PROCEDURE — 25000125 ZZHC RX 250: Performed by: HOSPITALIST

## 2017-03-22 PROCEDURE — 36415 COLL VENOUS BLD VENIPUNCTURE: CPT | Performed by: INTERNAL MEDICINE

## 2017-03-22 RX ORDER — DIPHENHYDRAMINE HCL 25 MG
25 CAPSULE ORAL ONCE
Status: DISCONTINUED | OUTPATIENT
Start: 2017-03-22 | End: 2017-03-27 | Stop reason: HOSPADM

## 2017-03-22 RX ORDER — LIDOCAINE 40 MG/G
CREAM TOPICAL
Status: DISCONTINUED | OUTPATIENT
Start: 2017-03-22 | End: 2017-03-22 | Stop reason: CLARIF

## 2017-03-22 RX ORDER — HEPARIN SODIUM,PORCINE 10 UNIT/ML
2-5 VIAL (ML) INTRAVENOUS
Status: DISCONTINUED | OUTPATIENT
Start: 2017-03-22 | End: 2017-03-22 | Stop reason: CLARIF

## 2017-03-22 RX ORDER — SODIUM POLYSTYRENE SULFONATE 15 G/60ML
15 SUSPENSION ORAL; RECTAL ONCE
Status: COMPLETED | OUTPATIENT
Start: 2017-03-22 | End: 2017-03-22

## 2017-03-22 RX ORDER — SODIUM CHLORIDE 9 MG/ML
INJECTION, SOLUTION INTRAVENOUS CONTINUOUS PRN
Status: DISCONTINUED | OUTPATIENT
Start: 2017-03-22 | End: 2017-03-24

## 2017-03-22 RX ORDER — SODIUM POLYSTYRENE SULFONATE 15 G/60ML
15 SUSPENSION ORAL; RECTAL EVERY 4 HOURS PRN
Status: DISCONTINUED | OUTPATIENT
Start: 2017-03-22 | End: 2017-03-27 | Stop reason: HOSPADM

## 2017-03-22 RX ORDER — SODIUM POLYSTYRENE SULFONATE 15 G/60ML
30 SUSPENSION ORAL; RECTAL EVERY 4 HOURS PRN
Status: DISCONTINUED | OUTPATIENT
Start: 2017-03-22 | End: 2017-03-27 | Stop reason: HOSPADM

## 2017-03-22 RX ORDER — METHYLPREDNISOLONE SODIUM SUCCINATE 125 MG/2ML
125 INJECTION, POWDER, LYOPHILIZED, FOR SOLUTION INTRAMUSCULAR; INTRAVENOUS
Status: DISCONTINUED | OUTPATIENT
Start: 2017-03-22 | End: 2017-03-27 | Stop reason: HOSPADM

## 2017-03-22 RX ORDER — SODIUM CHLORIDE 9 MG/ML
INJECTION, SOLUTION INTRAVENOUS CONTINUOUS
Status: DISCONTINUED | OUTPATIENT
Start: 2017-03-22 | End: 2017-03-22

## 2017-03-22 RX ORDER — ACETAMINOPHEN 325 MG/1
650 TABLET ORAL ONCE
Status: COMPLETED | OUTPATIENT
Start: 2017-03-22 | End: 2017-03-23

## 2017-03-22 RX ORDER — SODIUM POLYSTYRENE SULFONATE 15 G/60ML
30 SUSPENSION ORAL; RECTAL ONCE
Status: COMPLETED | OUTPATIENT
Start: 2017-03-22 | End: 2017-03-22

## 2017-03-22 RX ORDER — DEXTROSE MONOHYDRATE 25 G/50ML
25 INJECTION, SOLUTION INTRAVENOUS EVERY 30 MIN PRN
Status: DISCONTINUED | OUTPATIENT
Start: 2017-03-22 | End: 2017-03-27 | Stop reason: HOSPADM

## 2017-03-22 RX ORDER — ALBUTEROL SULFATE 0.83 MG/ML
2.5 SOLUTION RESPIRATORY (INHALATION) ONCE
Status: COMPLETED | OUTPATIENT
Start: 2017-03-22 | End: 2017-03-22

## 2017-03-22 RX ORDER — METHYLPREDNISOLONE SODIUM SUCCINATE 40 MG/ML
20 INJECTION, POWDER, LYOPHILIZED, FOR SOLUTION INTRAMUSCULAR; INTRAVENOUS 2 TIMES DAILY
Status: DISCONTINUED | OUTPATIENT
Start: 2017-03-22 | End: 2017-03-24

## 2017-03-22 RX ORDER — METHYLPREDNISOLONE SODIUM SUCCINATE 40 MG/ML
40 INJECTION, POWDER, LYOPHILIZED, FOR SOLUTION INTRAMUSCULAR; INTRAVENOUS 2 TIMES DAILY
Status: DISCONTINUED | OUTPATIENT
Start: 2017-03-22 | End: 2017-03-22

## 2017-03-22 RX ORDER — ALBUTEROL SULFATE 0.83 MG/ML
2.5 SOLUTION RESPIRATORY (INHALATION)
Status: DISCONTINUED | OUTPATIENT
Start: 2017-03-22 | End: 2017-03-27 | Stop reason: HOSPADM

## 2017-03-22 RX ORDER — DIPHENHYDRAMINE HYDROCHLORIDE 50 MG/ML
50 INJECTION INTRAMUSCULAR; INTRAVENOUS
Status: DISCONTINUED | OUTPATIENT
Start: 2017-03-22 | End: 2017-03-27 | Stop reason: HOSPADM

## 2017-03-22 RX ORDER — MEPERIDINE HYDROCHLORIDE 25 MG/ML
25 INJECTION INTRAMUSCULAR; INTRAVENOUS; SUBCUTANEOUS EVERY 30 MIN PRN
Status: DISCONTINUED | OUTPATIENT
Start: 2017-03-22 | End: 2017-03-27 | Stop reason: HOSPADM

## 2017-03-22 RX ORDER — ALLOPURINOL 300 MG/1
300 TABLET ORAL DAILY
Status: DISCONTINUED | OUTPATIENT
Start: 2017-03-22 | End: 2017-03-23

## 2017-03-22 RX ORDER — ALBUTEROL SULFATE 90 UG/1
1-2 AEROSOL, METERED RESPIRATORY (INHALATION)
Status: DISCONTINUED | OUTPATIENT
Start: 2017-03-22 | End: 2017-03-27 | Stop reason: HOSPADM

## 2017-03-22 RX ADMIN — IPRATROPIUM BROMIDE AND ALBUTEROL SULFATE 3 ML: .5; 3 SOLUTION RESPIRATORY (INHALATION) at 15:31

## 2017-03-22 RX ADMIN — CEFTRIAXONE 1 G: 1 INJECTION, POWDER, FOR SOLUTION INTRAMUSCULAR; INTRAVENOUS at 19:02

## 2017-03-22 RX ADMIN — FLUTICASONE FUROATE AND VILANTEROL TRIFENATATE 1 PUFF: 100; 25 POWDER RESPIRATORY (INHALATION) at 07:57

## 2017-03-22 RX ADMIN — METHYLPREDNISOLONE SODIUM SUCCINATE 62.5 MG: 125 INJECTION, POWDER, FOR SOLUTION INTRAMUSCULAR; INTRAVENOUS at 08:30

## 2017-03-22 RX ADMIN — HUMAN INSULIN 10 UNITS: 100 INJECTION, SOLUTION SUBCUTANEOUS at 12:51

## 2017-03-22 RX ADMIN — FUROSEMIDE 20 MG: 20 TABLET ORAL at 08:32

## 2017-03-22 RX ADMIN — DEXTROSE AND SODIUM CHLORIDE: 5; 900 INJECTION, SOLUTION INTRAVENOUS at 16:10

## 2017-03-22 RX ADMIN — LEVOTHYROXINE SODIUM 75 MCG: 75 TABLET ORAL at 08:32

## 2017-03-22 RX ADMIN — SODIUM POLYSTYRENE SULFONATE 15 G: 15 SUSPENSION ORAL; RECTAL at 16:33

## 2017-03-22 RX ADMIN — ZOLPIDEM TARTRATE 5 MG: 5 TABLET, FILM COATED ORAL at 22:04

## 2017-03-22 RX ADMIN — ATORVASTATIN CALCIUM 20 MG: 20 TABLET, FILM COATED ORAL at 08:32

## 2017-03-22 RX ADMIN — IPRATROPIUM BROMIDE AND ALBUTEROL SULFATE 3 ML: .5; 3 SOLUTION RESPIRATORY (INHALATION) at 07:57

## 2017-03-22 RX ADMIN — ALBUTEROL SULFATE 2.5 MG: 2.5 SOLUTION RESPIRATORY (INHALATION) at 12:14

## 2017-03-22 RX ADMIN — CALCIUM GLUCONATE 1 G: 94 INJECTION, SOLUTION INTRAVENOUS at 12:44

## 2017-03-22 RX ADMIN — DIPHENHYDRAMINE HYDROCHLORIDE 25 MG: 25 CAPSULE ORAL at 22:03

## 2017-03-22 RX ADMIN — METOPROLOL SUCCINATE 25 MG: 25 TABLET, EXTENDED RELEASE ORAL at 08:32

## 2017-03-22 RX ADMIN — IPRATROPIUM BROMIDE AND ALBUTEROL SULFATE 3 ML: .5; 3 SOLUTION RESPIRATORY (INHALATION) at 19:56

## 2017-03-22 RX ADMIN — GUAIFENESIN 600 MG: 600 TABLET, EXTENDED RELEASE ORAL at 19:51

## 2017-03-22 RX ADMIN — ALLOPURINOL 300 MG: 300 TABLET ORAL at 12:35

## 2017-03-22 RX ADMIN — GUAIFENESIN 600 MG: 600 TABLET, EXTENDED RELEASE ORAL at 08:32

## 2017-03-22 RX ADMIN — METHYLPREDNISOLONE SODIUM SUCCINATE 20 MG: 40 INJECTION, POWDER, FOR SOLUTION INTRAMUSCULAR; INTRAVENOUS at 19:08

## 2017-03-22 RX ADMIN — SODIUM POLYSTYRENE SULFONATE 30 G: 15 SUSPENSION ORAL; RECTAL at 11:16

## 2017-03-22 RX ADMIN — SODIUM POLYSTYRENE SULFONATE 15 G: 15 SUSPENSION ORAL; RECTAL at 19:49

## 2017-03-22 NOTE — PROVIDER NOTIFICATION
03/22/17 0700   Significant Event   Significant Event Other (see comments)  (K: 6.1)   MD sanford, rechmatt K: 6.7. Kayexalate given

## 2017-03-22 NOTE — PROGRESS NOTES
[facility]  Hospitalist Progress Note  Fide Wilkes MD 03/22/2017    Reason for Stay (Diagnosis): Acute hypoxic respiratory failure         Assessment and Plan:      Summary of Stay: Marie Kline is a 84 year old female with a history of COPD (not on home O2-but does have a concentrator at home), hx of CHF (broken heart syndrome) with normal echo during this admission (no dd or systolic dysfunction), CAD, CLL with severe leukocytosis (baseline is 160-180 range) admitted on 3/17/2017 with acute hypoxic respiratory failure 2/2 COPD exacerbation.     3 days pta she was seen by her primary MD and initiated on azith for suspected bronchitis. Her admission CXR is without e/o infiltrate but notable for central emphysematous changes. Influenza agn negative.  She remains afebrile.  procal not completed, likely due to pta abx. There is no clinical e/o volume overload and echo shows normal cardiac function, and bnp is not elevated.     Given her chronic CLL and concern for possible PE V/Q testing was completed and of indeterminate probability in light of her underlying emphysema so b LE dopplers completed and negative for DVT.  Her WBC notable for being in the 200-250 range (which is not unusual for her in the past during acute medical illnesses)  There was also some concern of melena but guiac testing is negative    Her brief hospitalization has been complicated by hyperkalemia into the 6.5 range, necessitating temporary  transfer to the ICU for K shifting medications, and kayexalate for body elimination. Likely due to transient tumor lysis.  It mirrors when her WBC is in the 250 range.  Discussed with Dr. Ivory and she feels it is now time to start some treatment for CLL with rituximab.  Unfortunately today - her K was 6.1-7.2 prompting kayexalate  and repeat K shifting medications and now in 5.8 range.  Thus far she has not had any EKG changes.  But for this reason will need to hold off on rituximab until able  to normalize total body K (not just shift it) as CLL treatment will likely lead to tumor lysis syndrome worsening K situation.     She had been slow to improve from a breathing situation but began turning around on 3/21.  Of note baseline RA sats at rest are 90% per her oximeter which she has brought in to have calibrated.  Problem List:   1. Acute hypoxic respiratory failure:  COPD with exacerbation presumed.  VQ completed yesterday indeterminate for PE, but echo without e/o RV overload or pulmonary hypertension, dopplers negative I doubt this is PE.  D-dimer will not be helpful in this situation in light of suspected active infection   2. Acute COPD with exacerbation-? Due to viral issue vs bronchitis vs environmental irritant.  Initially on  methylpred 40 mg IV q8-given the substantially elevated WBC tapered back to 60 mg IV bid on 3/20/19 and down to 40 mg bid on 3/21.  Since her white count is so high and we are having trouble with K, I will further reduce it to 20 mg IV bid 3/22. rec'd 6 days of azith for possible bacterial component-finally starting to turn the corner 3/21  3. COPD:  home regimen is :  flutic-salmeterol 250-50, prn albuterol.  I see no anticholinergic agent  4. CLL:  With severe leukocytosis-243-not uncommon for her per her report and in discussion with Dr. Ivory. ? Tumor lysis syndrome causing hyperkalemia?-WBC back up, K back up.  Will start rituximab am of tomorrow, K level too high and will likely worsen with treatment (tumor lysis).  Given that med is only viable until noon tomorrow and infusion can take up to 6 hours - oncology RN's Yarely and chemo supervising RN Chiquita will be here 3/23 at 6 am to start infusion.  Rituximab to be given only if K < 5.0. Has moderately high risk of reaction given degree of leukocytosis. Might have some mitigation to reaction in light of recent steroids.  Initiated on allopurinol, recheck LDH and uric acid.  IVF when rituximab starts.   5. Hyperkalemia:  "2/2 tumor lysis-I see no other reason, renal function is wnl/no culprit meds id'd.  Treated with IVF/Insulin-glucose/calcium for K shifting, and furosemide/kayexalate for K removal.  Was resolved-but recurrent this am.  K levels trended up to 7.1 so re-instituted insulin/calcium for shifting purposes, kayexalate for removal, serial K over night with prn kayexalate based on level.  Would like K < 5.0 in am to prepare for rituximab  6. CAD:  Cont statin/BB, but I see NO antiplatelet agent  7. Htn:  On metop xl 25 mg and furosemide 20 mg  DVT Prophylaxis: enox dc'd in light of ? Melena, then held in light of hyperkalemia (theorhetical issue but known to occur with heparin)-cont PCDs-if WBC trending back to baseline and K remains wnl will start sq AC tomorrow  Code Status: Full Code  Discharge Dispo: Unclear  Estimated Disch Date / # of Days until Disch: 3-5 ?? Maybe more        Interval History (Subjective):      Still coughing but breathing is better today. No cp, po intake is fair. no                  Physical Exam:      Last Vital Signs:  /68 (BP Location: Left arm)  Pulse 80  Temp 97.6  F (36.4  C) (Oral)  Resp 18  Ht 1.473 m (4' 10\")  Wt 49.5 kg (109 lb 1 oz)  SpO2 90%  BMI 22.79 kg/m2    I/O of questionable accuracy  Tele:  NSR-NO AFIB on review of tele strips    Pleasant nad looks stated age head nc/at sclera clear lungs moderate air movement at best, scattered wheeze persists.  RRR no m/r/g no le edema skin w/d no c/c abd s/nt/nd alert and oriented affect appropriate fernández            Medications:      All current medications were reviewed with changes reflected in problem list.         Data:      All new lab and imaging data was reviewed.   Labs:    Recent Labs  Lab 03/22/17  1400  03/22/17  0615   NA  --   --  137   POTASSIUM 5.8*  < > 6.1*   CHLORIDE  --   --  97   CO2  --   --  35*   ANIONGAP  --   --  5   GLC  --   --  121*   BUN  --   --  18   CR  --   --  0.71   GFRESTIMATED  --   --  79 "   GFRESTBLACK  --   --  >90African American GFR Calc   CARLOS  --   --  7.9*   < > = values in this interval not displayed.    Recent Labs  Lab 03/22/17  0615   .7*   HGB 9.1*   HCT 32.5*      *      Imaging:   B LE dopplers 3/19/17       FINDINGS: Negative. No evidence for DVT. Short term followup  recommended if symptoms persist.

## 2017-03-22 NOTE — PROGRESS NOTES
"SPIRITUAL HEALTH SERVICES  SPIRITUAL ASSESSMENT Progress Note  [facility] Med/Surg 332    PRIMARY FOCUS:     Emotional/spiritual/Congregational distress    Support for coping    ILLNESS CIRCUMSTANCES:   Reviewed documentation. Reflective conversation shared with Marie which integrated elements of illness and family narratives.     Context of Serious Illness/Symptom(s) - Marie is in the hospital because of COPD as well as complications from her leukemia. She will be having a round of chemotherapy during this admission.    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      Edgard Bennett  Chaplain Resident  Pager [phone]    "

## 2017-03-22 NOTE — PROVIDER NOTIFICATION
PICC Insertion Time-Out   Proceduralist prior to procedure:    Confirmed provider order for procedure    Verified appropriate supplies/equipment are available for procedure    Verified appropriate assessments have been completed     Prior to procedure the proceduralist instituted a 'Cease all activity' to confirm with a second nursing staff member the following:     Confirm patient identifiers using patient name and date of birth    Verify procedure to be performed    Verify consent was signed and witnessed    Verbalize any allergies    Verify code status     [Co-signature verification:  IV Access flowsheet > click any insertion column associated to a note > view Value Information)     Yarely Irving RN and Erika Willett, RN at 1030

## 2017-03-22 NOTE — DOWNTIME EVENT NOTE
The EMR was down for 2 hours and 50 minutes on 3/22/2017.    I was responsible for completing the paper charting during this time period.     The following information was re-entered into the system by Joselin Blackmon: none    The following information will remain in the paper chart: N/A    Joselin Blackmon  3/22/2017

## 2017-03-22 NOTE — PROGRESS NOTES
Oncology/Hematology Follow Up Note:    Assessment and Plan:  Admitted, with , shortness of breath, low pulse ox on 3/17/17      CLL Montero Stage III diagnosed in 2007 with lymphocytosis, anemia and thrombocytopenia  Has not been on any cytotoxic treatment  - was on IVIG every month for hypogammaglobulinemia  - her WBC has been in the range of 160-180 k  - rest of labs have been stable.  - has never been on treatment, besides IVIG.  - can revisit this discussion, based on status.  - updated her on numerous options available for this, with less toxicity. She is open to this, will discuss ongoing.  - Sent PBS, to rule out any change - no change, all CLL.      Leucocytosis  - stress/ infection/ steroids, along with CLL  - check LDH and uric acid( LDH only mildly elevated).  - WBC up higher today.  - Long discussion and plan is currently for Rituxamab one dose tomorrow.  - this can certainly cause lysis.      Pulmonary  - VQ scan intermediate  - Agree with holding off on IV therapeutic heparin, LE dopplers negative.  - on IV antibiotics and IV steroids.  - count not much above baseline - hence don't think leukostasis.  - also with CLL, typically leukostasis not seen unless closer to 400k.  - no criteria for leukapheresis.      Increased K  - K appears to rise with her WBC  - on kayexalate  - Later in day, suggestion that this could be pseudohyperkalemia( ex vivo due to clotting of blood in the tube)  - Note K was RC today, mild hemolysis on slide  - at 6 pm, tonight - plan on checking K , with tube on ice, no shaking the tube and run ASAP.  - await result  - arterial K would be most accurate.     Dark stool  - per Marie has resolved.     Anemia and thrombocytopenia  - from CLL  - observe.          Code  - full    D/w Dr Wilkes at 6 pm, plan is for Rituxamab, unless WBC is much lower.  RC LDH and uric acid are pending.  Rituxamab itself can cause lysis.  Await repeat K with measures as described.    Time: 35 minutes with  "patient , 30 minutes in counseling and coordination of care  Subjective:    Feeling better, denies pain, feels her breathing is better.    Scheduled Medications:  Reviewed active medications    Labs:  CBC RESULTS:   Recent Labs   Lab Test  03/22/17   0615  03/21/17   0631  03/20/17   0554   WBC  253.7*  227.5*  213.2*   HGB  9.1*  8.9*  9.2*   HCT  32.5*  31.5*  32.2*   MCV  100  99  99   PLT  113*  105*  108*       CMP  Recent Labs  Lab 03/22/17  1400 03/22/17  1305 03/22/17  1220 03/22/17  0810 03/22/17  0615 03/21/17  0631 03/20/17  0554  03/19/17  0530  03/17/17  0515   NA  --   --   --   --  137 139 140  --  139  < > 136   POTASSIUM 5.8* 2.7* 7.1* 6.7* 6.1* 4.9 3.8  < > 4.3  < > 4.7   CHLORIDE  --   --   --   --  97 99 99  --  100  < > 100   CO2  --   --   --   --  35* 35* 35*  --  32  < > 34*   ANIONGAP  --   --   --   --  5 5 6  --  7  < > 2*   GLC  --   --   --   --  121* 110* 145*  --  186*  < > 148*   BUN  --   --   --   --  18 14 14  --  18  < > 12   CR  --   --   --   --  0.71 0.71 0.77  --  0.82  < > 1.01   GFRESTIMATED  --   --   --   --  79 79 71  --  66  < > 52*   GFRESTBLACK  --   --   --   --  >90African American GFR Calc >90African American GFR Calc 86  --  80  < > 63   CARLOS  --   --   --   --  7.9* 7.8* 7.7*  --  8.4*  < > 8.2*   PROTTOTAL  --   --   --   --  5.3*  --   --   --   --   --  6.8   ALBUMIN  --   --   --   --  2.8*  --   --   --   --   --  3.6   BILITOTAL  --   --   --   --  0.4  --   --   --   --   --  0.5   ALKPHOS  --   --   --   --  121  --   --   --   --   --  151*   AST  --   --   --   --  38  --   --   --   --   --  33   ALT  --   --   --   --  24  --   --   --   --   --  20   < > = values in this interval not displayed.    INRNo lab results found in last 7 days.    Objective/Physical Exam:  Blood pressure 141/68, pulse 80, temperature 97.6  F (36.4  C), temperature source Oral, resp. rate 18, height 1.473 m (4' 10\"), weight 49.5 kg (109 lb 1 oz), SpO2 90 %, not currently " breastfeeding.  General appearance:alert, oriented, no acute distress  HEENT:sclera anicteric, no pallor, no thrush or mucositis.  Lungs: chest is clear to auscultation, no rales or rhonchi appreciated.  Abdomen: soft, NT, ND , BS normal  Ext: no edema or rashes    Isabel Ivory MD  [facility]  3/22/2017 6:09 PM

## 2017-03-22 NOTE — PLAN OF CARE
Problem: Goal Outcome Summary  Goal: Goal Outcome Summary  Outcome: No Change  Pt A&O x4, VSS 2L O2 - satting 92%, LS expiratory wheezes - fine crackle - dyspnea on exertion, BS audible - BM 3/20. SBA. Up in chair most of shift. Tele SR with PACs per tele tech.

## 2017-03-22 NOTE — PLAN OF CARE
Problem: Goal Outcome Summary  Goal: Goal Outcome Summary  A/O x4. SBA in room. Tele: SR. LS: dim. 94% O2 sats on 2 LPM. Tolerating regular diet. K levels elevated (see lab results), kayexalate given. Pt having several BMs. .7, MD notified. PICC placed today for Rituxan tomorrow morning. Runge visit today.

## 2017-03-23 LAB
ALBUMIN SERPL-MCNC: 2.9 G/DL (ref 3.4–5)
ALBUMIN SERPL-MCNC: 3.3 G/DL (ref 3.4–5)
ALP SERPL-CCNC: 136 U/L (ref 40–150)
ALT SERPL W P-5'-P-CCNC: 37 U/L (ref 0–50)
ANION GAP SERPL CALCULATED.3IONS-SCNC: 4 MMOL/L (ref 3–14)
ANION GAP SERPL CALCULATED.3IONS-SCNC: 7 MMOL/L (ref 3–14)
ANION GAP SERPL CALCULATED.3IONS-SCNC: 8 MMOL/L (ref 3–14)
AST SERPL W P-5'-P-CCNC: 54 U/L (ref 0–45)
BASOPHILS # BLD AUTO: 0 10E9/L (ref 0–0.2)
BASOPHILS NFR BLD AUTO: 0 %
BILIRUB SERPL-MCNC: 0.5 MG/DL (ref 0.2–1.3)
BUN SERPL-MCNC: 14 MG/DL (ref 7–30)
BUN SERPL-MCNC: 15 MG/DL (ref 7–30)
BUN SERPL-MCNC: 17 MG/DL (ref 7–30)
CALCIUM SERPL-MCNC: 7.1 MG/DL (ref 8.5–10.1)
CALCIUM SERPL-MCNC: 7.6 MG/DL (ref 8.5–10.1)
CALCIUM SERPL-MCNC: 8.1 MG/DL (ref 8.5–10.1)
CHLORIDE SERPL-SCNC: 100 MMOL/L (ref 94–109)
CHLORIDE SERPL-SCNC: 102 MMOL/L (ref 94–109)
CHLORIDE SERPL-SCNC: 104 MMOL/L (ref 94–109)
CO2 SERPL-SCNC: 32 MMOL/L (ref 20–32)
CO2 SERPL-SCNC: 33 MMOL/L (ref 20–32)
CO2 SERPL-SCNC: 35 MMOL/L (ref 20–32)
CREAT SERPL-MCNC: 0.7 MG/DL (ref 0.52–1.04)
CREAT SERPL-MCNC: 0.76 MG/DL (ref 0.52–1.04)
CREAT SERPL-MCNC: 0.76 MG/DL (ref 0.52–1.04)
CREAT SERPL-MCNC: 0.84 MG/DL (ref 0.52–1.04)
DIFFERENTIAL METHOD BLD: ABNORMAL
EOSINOPHIL # BLD AUTO: 3.8 10E9/L (ref 0–0.7)
EOSINOPHIL NFR BLD AUTO: 1 %
ERYTHROCYTE [DISTWIDTH] IN BLOOD BY AUTOMATED COUNT: ABNORMAL % (ref 10–15)
GFR SERPL CREATININE-BSD FRML MDRD: 64 ML/MIN/1.7M2
GFR SERPL CREATININE-BSD FRML MDRD: 72 ML/MIN/1.7M2
GFR SERPL CREATININE-BSD FRML MDRD: 73 ML/MIN/1.7M2
GFR SERPL CREATININE-BSD FRML MDRD: 79 ML/MIN/1.7M2
GLUCOSE SERPL-MCNC: 112 MG/DL (ref 70–99)
GLUCOSE SERPL-MCNC: 149 MG/DL (ref 70–99)
GLUCOSE SERPL-MCNC: 174 MG/DL (ref 70–99)
HCT VFR BLD AUTO: 37.5 % (ref 35–47)
HGB BLD-MCNC: 9.6 G/DL (ref 11.7–15.7)
HYPOCHROMIA BLD QL: PRESENT
LYMPHOCYTES # BLD AUTO: 364.5 10E9/L (ref 0.8–5.3)
LYMPHOCYTES NFR BLD AUTO: 96 %
MACROCYTES BLD QL SMEAR: PRESENT
MAGNESIUM SERPL-MCNC: 2.4 MG/DL (ref 1.6–2.3)
MCH RBC QN AUTO: 26.3 PG (ref 26.5–33)
MCHC RBC AUTO-ENTMCNC: 25.6 G/DL (ref 31.5–36.5)
MCV RBC AUTO: 103 FL (ref 78–100)
MICROCYTES BLD QL SMEAR: PRESENT
MONOCYTES # BLD AUTO: 3.8 10E9/L (ref 0–1.3)
MONOCYTES NFR BLD AUTO: 1 %
MRSA DNA SPEC QL NAA+PROBE: NORMAL
NEUTROPHILS # BLD AUTO: 7.6 10E9/L (ref 1.6–8.3)
NEUTROPHILS NFR BLD AUTO: 2 %
OVALOCYTES BLD QL SMEAR: ABNORMAL
PHOSPHATE SERPL-MCNC: 1.8 MG/DL (ref 2.5–4.5)
PHOSPHATE SERPL-MCNC: 2.6 MG/DL (ref 2.5–4.5)
PLATELET # BLD AUTO: 140 10E9/L (ref 150–450)
PLATELET # BLD EST: ABNORMAL 10*3/UL
POIKILOCYTOSIS BLD QL SMEAR: SLIGHT
POTASSIUM SERPL-SCNC: 2.6 MMOL/L (ref 3.4–5.3)
POTASSIUM SERPL-SCNC: 2.8 MMOL/L (ref 3.4–5.3)
POTASSIUM SERPL-SCNC: 3 MMOL/L (ref 3.4–5.3)
POTASSIUM SERPL-SCNC: 5.8 MMOL/L (ref 3.4–5.3)
POTASSIUM SERPL-SCNC: 6.1 MMOL/L (ref 3.4–5.3)
PROT SERPL-MCNC: 6 G/DL (ref 6.8–8.8)
RBC # BLD AUTO: 3.65 10E12/L (ref 3.8–5.2)
SMUDGE CELLS BLD QL SMEAR: PRESENT
SODIUM SERPL-SCNC: 139 MMOL/L (ref 133–144)
SODIUM SERPL-SCNC: 142 MMOL/L (ref 133–144)
SODIUM SERPL-SCNC: 144 MMOL/L (ref 133–144)
SPECIMEN SOURCE: NORMAL
URATE SERPL-MCNC: 4 MG/DL (ref 2.6–6)
URATE SERPL-MCNC: 4.1 MG/DL (ref 2.6–6)
WBC # BLD AUTO: 379.7 10E9/L (ref 4–11)

## 2017-03-23 PROCEDURE — 84132 ASSAY OF SERUM POTASSIUM: CPT | Performed by: INTERNAL MEDICINE

## 2017-03-23 PROCEDURE — 87640 STAPH A DNA AMP PROBE: CPT | Performed by: INTERNAL MEDICINE

## 2017-03-23 PROCEDURE — 40000275 ZZH STATISTIC RCP TIME EA 10 MIN

## 2017-03-23 PROCEDURE — 40000809 ZZH STATISTIC NO DOCUMENTATION TO SUPPORT CHARGE

## 2017-03-23 PROCEDURE — 82565 ASSAY OF CREATININE: CPT | Performed by: HOSPITALIST

## 2017-03-23 PROCEDURE — 25000132 ZZH RX MED GY IP 250 OP 250 PS 637: Mod: GY | Performed by: INTERNAL MEDICINE

## 2017-03-23 PROCEDURE — 25000128 H RX IP 250 OP 636: Performed by: INTERNAL MEDICINE

## 2017-03-23 PROCEDURE — 25000132 ZZH RX MED GY IP 250 OP 250 PS 637: Mod: GY | Performed by: HOSPITALIST

## 2017-03-23 PROCEDURE — 80053 COMPREHEN METABOLIC PANEL: CPT | Performed by: INTERNAL MEDICINE

## 2017-03-23 PROCEDURE — 80048 BASIC METABOLIC PNL TOTAL CA: CPT | Performed by: INTERNAL MEDICINE

## 2017-03-23 PROCEDURE — 25000125 ZZHC RX 250

## 2017-03-23 PROCEDURE — 36620 INSERTION CATHETER ARTERY: CPT | Mod: 52 | Performed by: INTERNAL MEDICINE

## 2017-03-23 PROCEDURE — 84132 ASSAY OF SERUM POTASSIUM: CPT | Performed by: HOSPITALIST

## 2017-03-23 PROCEDURE — 94640 AIRWAY INHALATION TREATMENT: CPT

## 2017-03-23 PROCEDURE — 36600 WITHDRAWAL OF ARTERIAL BLOOD: CPT

## 2017-03-23 PROCEDURE — A9270 NON-COVERED ITEM OR SERVICE: HCPCS | Mod: GY | Performed by: HOSPITALIST

## 2017-03-23 PROCEDURE — 99233 SBSQ HOSP IP/OBS HIGH 50: CPT | Performed by: INTERNAL MEDICINE

## 2017-03-23 PROCEDURE — 99207 ZZC APP CREDIT; MD BILLING SHARED VISIT: CPT | Performed by: HOSPITALIST

## 2017-03-23 PROCEDURE — 20000003 ZZH R&B ICU

## 2017-03-23 PROCEDURE — 82310 ASSAY OF CALCIUM: CPT | Performed by: INTERNAL MEDICINE

## 2017-03-23 PROCEDURE — 85025 COMPLETE CBC W/AUTO DIFF WBC: CPT | Performed by: INTERNAL MEDICINE

## 2017-03-23 PROCEDURE — 83735 ASSAY OF MAGNESIUM: CPT | Performed by: INTERNAL MEDICINE

## 2017-03-23 PROCEDURE — 84550 ASSAY OF BLOOD/URIC ACID: CPT | Performed by: INTERNAL MEDICINE

## 2017-03-23 PROCEDURE — A9270 NON-COVERED ITEM OR SERVICE: HCPCS | Mod: GY | Performed by: INTERNAL MEDICINE

## 2017-03-23 PROCEDURE — 25000125 ZZHC RX 250: Performed by: HOSPITALIST

## 2017-03-23 PROCEDURE — 94640 AIRWAY INHALATION TREATMENT: CPT | Mod: 76

## 2017-03-23 PROCEDURE — 80069 RENAL FUNCTION PANEL: CPT | Performed by: INTERNAL MEDICINE

## 2017-03-23 PROCEDURE — 25000125 ZZHC RX 250: Performed by: INTERNAL MEDICINE

## 2017-03-23 PROCEDURE — 84100 ASSAY OF PHOSPHORUS: CPT | Performed by: INTERNAL MEDICINE

## 2017-03-23 PROCEDURE — 25000128 H RX IP 250 OP 636: Performed by: HOSPITALIST

## 2017-03-23 PROCEDURE — 87641 MR-STAPH DNA AMP PROBE: CPT | Performed by: INTERNAL MEDICINE

## 2017-03-23 RX ORDER — ALLOPURINOL 100 MG/1
200 TABLET ORAL 2 TIMES DAILY
Status: DISCONTINUED | OUTPATIENT
Start: 2017-03-23 | End: 2017-03-27 | Stop reason: HOSPADM

## 2017-03-23 RX ORDER — FUROSEMIDE 10 MG/ML
40 INJECTION INTRAMUSCULAR; INTRAVENOUS ONCE
Status: COMPLETED | OUTPATIENT
Start: 2017-03-23 | End: 2017-03-23

## 2017-03-23 RX ORDER — ALBUTEROL SULFATE 0.83 MG/ML
2.5 SOLUTION RESPIRATORY (INHALATION) ONCE
Status: DISCONTINUED | OUTPATIENT
Start: 2017-03-23 | End: 2017-03-27 | Stop reason: HOSPADM

## 2017-03-23 RX ORDER — POTASSIUM CHLORIDE 1.5 G/1.58G
20 POWDER, FOR SOLUTION ORAL ONCE
Status: COMPLETED | OUTPATIENT
Start: 2017-03-23 | End: 2017-03-23

## 2017-03-23 RX ORDER — POTASSIUM CHLORIDE 20MEQ/15ML
20 LIQUID (ML) ORAL ONCE
Status: DISCONTINUED | OUTPATIENT
Start: 2017-03-23 | End: 2017-03-23

## 2017-03-23 RX ORDER — POTASSIUM CHLORIDE 1500 MG/1
20 TABLET, EXTENDED RELEASE ORAL ONCE
Status: COMPLETED | OUTPATIENT
Start: 2017-03-23 | End: 2017-03-23

## 2017-03-23 RX ORDER — LIDOCAINE HYDROCHLORIDE 10 MG/ML
INJECTION, SOLUTION INFILTRATION; PERINEURAL
Status: COMPLETED
Start: 2017-03-23 | End: 2017-03-23

## 2017-03-23 RX ORDER — SODIUM POLYSTYRENE SULFONATE 15 G/60ML
45 SUSPENSION ORAL; RECTAL ONCE
Status: COMPLETED | OUTPATIENT
Start: 2017-03-23 | End: 2017-03-23

## 2017-03-23 RX ADMIN — IPRATROPIUM BROMIDE AND ALBUTEROL SULFATE 3 ML: .5; 3 SOLUTION RESPIRATORY (INHALATION) at 20:11

## 2017-03-23 RX ADMIN — FUROSEMIDE 40 MG: 10 INJECTION, SOLUTION INTRAVENOUS at 06:25

## 2017-03-23 RX ADMIN — FLUTICASONE FUROATE AND VILANTEROL TRIFENATATE 1 PUFF: 100; 25 POWDER RESPIRATORY (INHALATION) at 08:34

## 2017-03-23 RX ADMIN — ACETAMINOPHEN 650 MG: 325 TABLET, FILM COATED ORAL at 09:52

## 2017-03-23 RX ADMIN — DEXTROSE AND SODIUM CHLORIDE: 5; 900 INJECTION, SOLUTION INTRAVENOUS at 05:09

## 2017-03-23 RX ADMIN — IPRATROPIUM BROMIDE AND ALBUTEROL SULFATE 3 ML: .5; 3 SOLUTION RESPIRATORY (INHALATION) at 15:56

## 2017-03-23 RX ADMIN — GUAIFENESIN 600 MG: 600 TABLET, EXTENDED RELEASE ORAL at 09:05

## 2017-03-23 RX ADMIN — ALLOPURINOL 300 MG: 300 TABLET ORAL at 09:05

## 2017-03-23 RX ADMIN — ALLOPURINOL 200 MG: 100 TABLET ORAL at 20:48

## 2017-03-23 RX ADMIN — POTASSIUM CHLORIDE 20 MEQ: 1500 TABLET, EXTENDED RELEASE ORAL at 18:46

## 2017-03-23 RX ADMIN — ATORVASTATIN CALCIUM 20 MG: 20 TABLET, FILM COATED ORAL at 09:05

## 2017-03-23 RX ADMIN — FUROSEMIDE 20 MG: 20 TABLET ORAL at 09:05

## 2017-03-23 RX ADMIN — LEVOTHYROXINE SODIUM 75 MCG: 75 TABLET ORAL at 09:05

## 2017-03-23 RX ADMIN — METOPROLOL SUCCINATE 25 MG: 25 TABLET, EXTENDED RELEASE ORAL at 09:05

## 2017-03-23 RX ADMIN — SODIUM POLYSTYRENE SULFONATE 45 G: 15 SUSPENSION ORAL; RECTAL at 06:30

## 2017-03-23 RX ADMIN — IPRATROPIUM BROMIDE AND ALBUTEROL SULFATE 3 ML: .5; 3 SOLUTION RESPIRATORY (INHALATION) at 08:26

## 2017-03-23 RX ADMIN — DIPHENHYDRAMINE HYDROCHLORIDE 25 MG: 25 CAPSULE ORAL at 22:02

## 2017-03-23 RX ADMIN — DEXTROSE AND SODIUM CHLORIDE: 5; 900 INJECTION, SOLUTION INTRAVENOUS at 17:26

## 2017-03-23 RX ADMIN — CEFTRIAXONE 1 G: 1 INJECTION, POWDER, FOR SOLUTION INTRAMUSCULAR; INTRAVENOUS at 17:26

## 2017-03-23 RX ADMIN — METHYLPREDNISOLONE SODIUM SUCCINATE 20 MG: 40 INJECTION, POWDER, FOR SOLUTION INTRAMUSCULAR; INTRAVENOUS at 18:45

## 2017-03-23 RX ADMIN — ZOLPIDEM TARTRATE 5 MG: 5 TABLET, FILM COATED ORAL at 22:02

## 2017-03-23 RX ADMIN — SODIUM POLYSTYRENE SULFONATE 30 G: 15 SUSPENSION ORAL; RECTAL at 01:39

## 2017-03-23 RX ADMIN — RITUXIMAB 530 MG: 10 INJECTION, SOLUTION INTRAVENOUS at 11:52

## 2017-03-23 RX ADMIN — POTASSIUM CHLORIDE 20 MEQ: 1.5 POWDER, FOR SOLUTION ORAL at 09:52

## 2017-03-23 RX ADMIN — LIDOCAINE HYDROCHLORIDE: 10 INJECTION, SOLUTION INFILTRATION; PERINEURAL at 11:52

## 2017-03-23 RX ADMIN — IPRATROPIUM BROMIDE AND ALBUTEROL SULFATE 3 ML: .5; 3 SOLUTION RESPIRATORY (INHALATION) at 12:00

## 2017-03-23 RX ADMIN — GUAIFENESIN 600 MG: 600 TABLET, EXTENDED RELEASE ORAL at 20:48

## 2017-03-23 RX ADMIN — METHYLPREDNISOLONE SODIUM SUCCINATE 20 MG: 40 INJECTION, POWDER, FOR SOLUTION INTRAMUSCULAR; INTRAVENOUS at 09:10

## 2017-03-23 NOTE — PROGRESS NOTES
Rituxan infusion 50ml/hr started at 1205 via PICC line. VSS/resp status stable during first hour. Rate increase to 100hr. Will continue to monitor for any hypersensitivity reactions.

## 2017-03-23 NOTE — PROGRESS NOTES
[facility]    Oncology Progress Note    Date of Service (when I saw the patient): 03/23/2017     Assessment & Plan   Marie Kline is a 84 year old female who was admitted on 3/17/2017.           []Hide copied text  Oncology/Hematology Follow Up Note:     Assessment and Plan:  Admitted, with , shortness of breath, low pulse ox on 3/17/17      CLL  - Montero Stage III diagnosed in 2007 with lymphocytosis, anemia and thrombocytopenia  - has not been on any cytotoxic treatment  - was on IVIG every month for hypogammaglobulinemia  - her WBC has been in the range of 160-180 k  - rest of labs have been stable.  - with progressive leukocytosis, anemia, and possible tumor lysis will begin rituximab as single agent today as discussed with Dr Ivory  -potential side effects including allergic reactions and risk of tumor lysis reviewed   -high risk of future tumor lysis, continue allopurinol and consult nephrology    Pulmonary  - VQ scan intermediate  - Agree with holding off on IV therapeutic heparin, LE dopplers negative.  - on IV antibiotics and IV steroids.        Increased K  - K appears to rise with her WBC  - on kayexalate  - arterial K would be most accurate  - appreciate hospitalist care.        Anemia and thrombocytopenia  - from CLL  - monitor CBC serially    Leukocytosis  -due to CLL with reactive leukocytosis due to steroids and stress  -monitor serially as we begin rituximab              Code Status: Full Code    Augusto Chavez    Interval History   No new concerns    Physical Exam   Temp: 97.5  F (36.4  C) Temp src: Oral BP: 153/68 Pulse: 84 Heart Rate: 89 Resp: 18 SpO2: 91 % O2 Device: Nasal cannula Oxygen Delivery: 2 LPM  Vitals:    03/21/17 0700 03/22/17 0548 03/23/17 0450   Weight: 48.9 kg (107 lb 14.4 oz) 49.5 kg (109 lb 1 oz) 49.2 kg (108 lb 7 oz)     Vital Signs with Ranges  Temp:  [97.5  F (36.4  C)-98.6  F (37  C)] 97.5  F (36.4  C)  Pulse:  [84] 84  Heart Rate:  [76-89] 89  Resp:  [18-20]  18  BP: (141-153)/(68-78) 153/68  SpO2:  [90 %-94 %] 91 %       Constitutional: awake, alert, cooperative, no apparent distress  GI: soft, non-distended, non-tender  Musculoskeletal: no pedal edema    Medications     - MEDICATION INSTRUCTIONS -       NaCl       IV infusion builder WITH additives 150 mL/hr at 03/23/17 0509       albuterol  2.5 mg Nebulization Once     allopurinol  300 mg Oral Daily     acetaminophen  650 mg Oral Once     diphenhydrAMINE  25 mg Oral Once     sodium chloride (PF)  10 mL Intracatheter Q7 Days     methylPREDNISolone  20 mg Intravenous BID     furosemide  40 mg Intravenous Once     cefTRIAXone  1 g Intravenous Q24H     sodium chloride (PF)  3 mL Intracatheter Q8H     atorvastatin  20 mg Oral Daily     diphenhydrAMINE  25 mg Oral At Bedtime     furosemide  20 mg Oral Daily     metoprolol  25 mg Oral Daily     levothyroxine  75 mcg Oral Daily     ipratropium - albuterol 0.5 mg/2.5 mg/3 mL  3 mL Nebulization 4x daily     guaiFENesin  600 mg Oral BID     fluticasone-vilanterol  1 puff Inhalation Daily       Data   Results for orders placed or performed during the hospital encounter of 03/17/17 (from the past 24 hour(s))   Glucose by meter   Result Value Ref Range    Glucose 126 (H) 70 - 99 mg/dL   Potassium   Result Value Ref Range    Potassium 7.1 (HH) 3.4 - 5.3 mmol/L   Potassium   Result Value Ref Range    Potassium 2.7 (L) 3.4 - 5.3 mmol/L   Glucose by meter   Result Value Ref Range    Glucose 98 70 - 99 mg/dL   Potassium   Result Value Ref Range    Potassium 5.8 (H) 3.4 - 5.3 mmol/L   EKG 12-lead, tracing only   Result Value Ref Range    Interpretation ECG Click View Image link to view waveform and result    Glucose by meter   Result Value Ref Range    Glucose 153 (H) 70 - 99 mg/dL   Potassium   Result Value Ref Range    Potassium 5.5 (H) 3.4 - 5.3 mmol/L   Uric acid   Result Value Ref Range    Uric Acid 4.6 2.6 - 6.0 mg/dL   Lactate Dehydrogenase   Result Value Ref Range    Lactate  Dehydrogenase 325 (H) 81 - 234 U/L   Creatinine   Result Value Ref Range    Creatinine 0.70 0.52 - 1.04 mg/dL    GFR Estimate 79 >60 mL/min/1.7m2    GFR Estimate If Black >90   GFR Calc   >60 mL/min/1.7m2   Potassium   Result Value Ref Range    Potassium 5.8 (H) 3.4 - 5.3 mmol/L   Comprehensive metabolic panel   Result Value Ref Range    Sodium 139 133 - 144 mmol/L    Potassium 6.1 (HH) 3.4 - 5.3 mmol/L    Chloride 100 94 - 109 mmol/L    Carbon Dioxide 35 (H) 20 - 32 mmol/L    Anion Gap 4 3 - 14 mmol/L    Glucose 112 (H) 70 - 99 mg/dL    Urea Nitrogen 17 7 - 30 mg/dL    Creatinine 0.76 0.52 - 1.04 mg/dL    GFR Estimate 73 >60 mL/min/1.7m2    GFR Estimate If Black 88 >60 mL/min/1.7m2    Calcium 8.1 (L) 8.5 - 10.1 mg/dL    Bilirubin Total 0.5 0.2 - 1.3 mg/dL    Albumin 3.3 (L) 3.4 - 5.0 g/dL    Protein Total 6.0 (L) 6.8 - 8.8 g/dL    Alkaline Phosphatase 136 40 - 150 U/L    ALT 37 0 - 50 U/L    AST 54 (H) 0 - 45 U/L   CBC with platelets differential   Result Value Ref Range    .7 (HH) 4.0 - 11.0 10e9/L    RBC Count 3.65 (L) 3.8 - 5.2 10e12/L    Hemoglobin 9.6 (L) 11.7 - 15.7 g/dL    Hematocrit 37.5 35.0 - 47.0 %     (H) 78 - 100 fl    MCH 26.3 (L) 26.5 - 33.0 pg    MCHC 25.6 (L) 31.5 - 36.5 g/dL    RDW Dimorphic population - unable to calculate 10.0 - 15.0 %    Platelet Count 140 (L) 150 - 450 10e9/L    % Neutrophils 2.0 %    % Lymphocytes 96.0 %    % Monocytes 1.0 %    % Eosinophils 1.0 %    % Basophils 0.0 %    Absolute Neutrophil 7.6 1.6 - 8.3 10e9/L    Absolute Lymphocytes 364.5 (H) 0.8 - 5.3 10e9/L    Absolute Monocytes 3.8 (H) 0.0 - 1.3 10e9/L    Absolute Eosinophils 3.8 (H) 0.0 - 0.7 10e9/L    Absolute Basophils 0.0 0.0 - 0.2 10e9/L    Poikilocytosis Slight     Ovalocytes Moderate     Microcytes Present     Macrocytes Present     Hypochromasia Present     Smudge Cells Present     Platelet Estimate Decreased     Diff Method       Manual Differential   Differential done on albumin  treated blood due to smudge cells.  CORRECTED ON 03/23 AT 0825: PREVIOUSLY REPORTED AS Manual Differential

## 2017-03-23 NOTE — PROGRESS NOTES
Slept well for awhile VSS. Rituxan rate increased to 100ml/hr at 1415. Thus far tolerating well. Will continue to monitor

## 2017-03-23 NOTE — PROGRESS NOTES
Assumed chemo care for pt @ 1500.  Rituxan continues to infuse @ 100ml/hr via red lumen of PICC.  Pt denies chills.  Afebrile.  Denies SOB.  O2sats variable (upper 80s to mid 90s)- O2 NC increased to 3L O2 per primary RN.  All other VSS.  Receiving scheduled neb now per RT.  Pt and Wenceslao CROWLEY, updated with POC.  Will continue to monitor closely.

## 2017-03-23 NOTE — PROGRESS NOTES
XC    FU for pt with possible TLS, CLL, persistent hyperK. Discussed with Dr Wilkes last night. Pt has no sign of vaso-occlusive sx such as CVA, MI, peripheral ischemia.    AM labs show K 6.1, , Ca 8.1.     Plan:  - Continue high rate IVF  - Lasix 40 mg IV x1  - repeat more kayexalate 45 g x1  - albuterol neb x1  - consider further shifting with dextrose/insulin  - Onc paged to discuss using rituxin as planned vs leukopheresis vs other (awaiting call back)  - Will sign out to Dr Wilkes shortly    Wenceslao Craig  March 23, 2017

## 2017-03-23 NOTE — PLAN OF CARE
Problem: COPD, Chronic Bronchitis/Emphysema (Adult)  Goal: Signs and Symptoms of Listed Potential Problems Will be Absent or Manageable (COPD, Chronic Bronchitis/Emphysema)  Signs and symptoms of listed potential problems will be absent or manageable by discharge/transition of care (reference COPD, Chronic Bronchitis/Emphysema (Adult) CPG).   Outcome: Declining  VSS afebrile. K+ 6.1 on Kayexalate, and PO Lasix. MD aware, re draw arterial K+ 2.8, WBC continue to rise. Heme/Onc following.  Plan for Rituxan, Chemo nurses aware. Pre meds PO benadryl and tylenol given. Plan to transfer to ICU for arterial line to monitor K+. On IVF's , in IV ABX for PNA. RT following with Nebs. On IV steroids.

## 2017-03-23 NOTE — PROGRESS NOTES
Called MD regarding WBC of 379.7 and potassium of 6.1. Dr. Craig came to the floor to discuss the patient. Dr. Craig also went in and talked to the patient in regards to critical labs. Got orders for lasix 40 mg IV once, Kayexalate 45 g po x 1, albuterol neb x 1, and to continue the IV fluids. Also call out to oncology to discuss what they would like to do in regards to giving Rituxin or if would like to do another treatment. Will continue to monitor.

## 2017-03-23 NOTE — PROCEDURES
Procedure: Arterial line placement    Consent: obtained, signed after risk benefit explained and all questions answered to the best of my knowledged and on file in the chart.    Location: Arterial line insertion attempt (unsuccessful) into the left radial artery.    Universal Protocol: Patient Identification was verified, time out was performed, site marking N/A, Imaging data N/A. Full Barrier precaution done: Hands washed, mask, gloves, gown, cap and eye protection all used.    Indication: frequent arterial samples for pseudohyperkalemia .    Narrative: Area prepped with chlorhexedine and draped in sterile fashion. 1% lidocaine injected subcutaneously for local anesthesia. Under direct ultrasound imaging, arterial catheter inserted into radial artery but unable to be advanced off wire. Distal blood flow preserved and pressure dressing applied  Complications: No apparent complication    Procedure performed by Dr Powell on March 23, 2017

## 2017-03-23 NOTE — CONSULTS
RENAL CONSULTATION NOTE    REFERRING MD:  Dr. Ivory   REASON FOR CONSULTATION: Co-management tumor lysis syndrome prevention    HPI:  84 y.o pleasant woman with CLL, COPD and CAD, who was admitted on 3/17 for shortness of breath. She was diagnosed with COPD exacerbation. She was started on Solumedrol. Her breathing is much improved, but she is still using 2 liters O2. She does not use oxygen at home. Her hospital course is complicated with worsening leukocytosis with underlying leukocytosis form CLL and likely pseudohyperkalemia. Hyperkalemia was medically treated. Due to worsening leukocytosis, Dr. Ivory decided to treat her with Rituxan. She is getting Rituxan infusion at currently. She was started on allopurinol 300 mg daily on 3/22. She is getting D5 NS at 100 cc/hr. Given her tumor burden and risk for tumor lysis syndrome, Dr. Ivory requested nephrology to be on board.     ROS:  A complete review of systems was performed and is negative except as noted above.    PMH:    Past Medical History:   Diagnosis Date     Bladder cancer (H)      Cardiomyopathy (H)      CLL (chronic lymphocytic leukemia) (H)      Congestive heart failure (H) 10/24/2014    flash pulm edema ass w/Takotsubo     COPD (chronic obstructive pulmonary disease) (H)     noc O2     Hypothyroid      Myocardial infarction (H) 10/2014    Takotsubo presentation w/mild CAD     Pulmonary edema cardiac cause (H) 10/08/2014    associated w/Takotsubo ACS     Tricuspid valve disorders, 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    TR 2-3+ per echo       PSH:    Past Surgical History:   Procedure Laterality Date     CORONARY ANGIOGRAPHY ADULT ORDER  10/2014    minimal CAD     CYSTOSCOPY, BIOPSY BLADDER, COMBINED N/A 2/9/2016    Procedure: COMBINED CYSTOSCOPY, BIOPSY BLADDER;  Surgeon: Kar Nuñez MD;  Location: [facility] OR     CYSTOSCOPY, TRANSURETHRAL RESECTION (TUR) TUMOR BLADDER, COMBINED N/A 2/9/2016    Procedure: COMBINED CYSTOSCOPY, TRANSURETHRAL RESECTION (TUR)  TUMOR BLADDER;  Surgeon: Kar Nuñez MD;  Location: [facility] OR     ESOPHAGOSCOPY, GASTROSCOPY, DUODENOSCOPY (EGD), COMBINED N/A 6/22/2016    Procedure: COMBINED ESOPHAGOSCOPY, GASTROSCOPY, DUODENOSCOPY (EGD);  Surgeon: Freddie Diez MD;  Location:  GI       MEDICATIONS:      albuterol  2.5 mg Nebulization Once     allopurinol  300 mg Oral Daily     diphenhydrAMINE  25 mg Oral Once     sodium chloride (PF)  10 mL Intracatheter Q7 Days     methylPREDNISolone  20 mg Intravenous BID     furosemide  40 mg Intravenous Once     cefTRIAXone  1 g Intravenous Q24H     sodium chloride (PF)  3 mL Intracatheter Q8H     atorvastatin  20 mg Oral Daily     diphenhydrAMINE  25 mg Oral At Bedtime     furosemide  20 mg Oral Daily     metoprolol  25 mg Oral Daily     levothyroxine  75 mcg Oral Daily     ipratropium - albuterol 0.5 mg/2.5 mg/3 mL  3 mL Nebulization 4x daily     guaiFENesin  600 mg Oral BID     fluticasone-vilanterol  1 puff Inhalation Daily       ALLERGIES:    Allergies as of 03/17/2017 - Ean as Reviewed 03/17/2017   Allergen Reaction Noted     Contrast dye Hives 02/03/2016       FH:    Family History   Problem Relation Age of Onset     CEREBROVASCULAR DISEASE Mother      CANCER Father        SH:    Social History     Social History     Marital status:      Spouse name: N/A     Number of children: N/A     Years of education: N/A     Occupational History     Not on file.     Social History Main Topics     Smoking status: Former Smoker     Types: Cigarettes     Quit date: 2/3/1996     Smokeless tobacco: Never Used     Alcohol use No     Drug use: No     Sexual activity: No     Other Topics Concern     Caffeine Concern No     1/2-2 cups daily     Special Diet Yes     no added salt, no dairy, more gatorade     Exercise No     walking her dog 5 times per day - none recently     Parent/Sibling W/ Cabg, Mi Or Angioplasty Before 65f 55m? No     Social History Narrative       PHYSICAL EXAM:    /81  Pulse  "84  Temp 96.2  F (35.7  C) (Axillary)  Resp (!) 31  Ht 1.473 m (4' 9.99\")  Wt 49.2 kg (108 lb 7 oz)  SpO2 92%  BMI 22.67 kg/m2  GENERAL: pleasant, alert, NAD  HEENT:  Normocephalic. No gross abnormalities.  Pupils equal.  MMM.    CV: RRR, no murmurs, no clicks, gallops, or rubs, no edema, no carotid bruits  RESP: BS diminish. No wheezes  GI: Abdomen soft, NT  MUSCULOSKELETAL: extremities nl - no gross deformities noted  SKIN: no suspicious lesions or rashes, dry to touch  NEURO:  Strength normal and symmetric. A/o x 3  PSYCH: mood good, affect appropriate. Pleasant and happy  LYMPH: No palpable ant/post cervical    LABS:      CBC RESULTS:     Recent Labs  Lab 03/23/17  0505 03/22/17  0615 03/21/17  0631 03/20/17  0554 03/19/17  1609 03/19/17  0530   .7* 253.7* 227.5* 213.2* 282.1* 243.6*   RBC 3.65* 3.26* 3.17* 3.24* 3.54* 3.49*   HGB 9.6* 9.1* 8.9* 9.2* 9.6* 9.8*   HCT 37.5 32.5* 31.5* 32.2* 35.1 34.1*   * 113* 105* 108* 130* 129*       BMP RESULTS:    Recent Labs  Lab 03/23/17  0820 03/23/17  0505 03/23/17  0025 03/22/17  1800 03/22/17  1400 03/22/17  1305  03/22/17  0615 03/21/17  0631 03/20/17  0554  03/19/17  0530 03/19/17  0300 03/18/17 2002   NA  --  139  --   --   --   --   --  137 139 140  --  139  --  133   POTASSIUM 2.8* 6.1* 5.8* 5.5* 5.8* 2.7*  < > 6.1* 4.9 3.8  < > 4.3 6.3* 6.5*   CHLORIDE  --  100  --   --   --   --   --  97 99 99  --  100  --  98   CO2  --  35*  --   --   --   --   --  35* 35* 35*  --  32  --  29   BUN  --  17  --   --   --   --   --  18 14 14  --  18  --  20   CR  --  0.76 0.70  --   --   --   --  0.71 0.71 0.77  --  0.82  --  0.89   GLC  --  112*  --   --   --   --   --  121* 110* 145*  --  186* 144* 132*   CARLOS  --  8.1*  --   --   --   --   --  7.9* 7.8* 7.7*  --  8.4*  --  8.7   < > = values in this interval not displayed.    INRNo lab results found in last 7 days.     DIAGNOSTICS:  Reviewed    TTE 3/17: Normal LV/RF function  CXR 3/17: no infiltrate or pulm " edema    A/P:  84 y.o pleasant woman with worsening CLL, who is getting Rituxan, and nephrology service was requested by Dr. Ivory to help co-manage patient in case she develops TLS given her tumor burden. I agree with D5NS at 150 cc/hr. Please monitor urine output closely. I agree with with allopurinol since her uric acid level is normal. I will change allopurinol to 200 mg bid. I agree with not using Rasburicase for now unless her uric acid rises. Monitor uric acid, potassium, phosphorus and calcium level q6hrs. I discussed the role of RRT in TLS. She agreed to RRT if she needs it. She has pseudohyperkalemia due to severe leukocytosis. Okay to do arterial K check or have labs use non-heparinized tube. I appreciate this consult from Dr. Ivory. I will follow the patient with the team. Thank you.    Ricky Spangler MD  [facility] - Nephrology  Office Phone: [phone]  Pager: [phone]

## 2017-03-23 NOTE — PROGRESS NOTES
C/o some coldness in her legs. Mild chilling when up to the bathroom. Rituxan stopped for approximately 10 minutes and then restarted again at 50ml/hr. No respiratory symptoms. BP slightly lower when back to bed. 141/59 and 125/53. Resting comfortably and trying to take a nap

## 2017-03-23 NOTE — PROGRESS NOTES
Rituxan continues to infuse @ 100ml/hr via red lumen of PICC. Pt denies chills. VSS.  Up to BR x 1- voided 350ml clear,yellow urine.  No BM.  KEO and Grandson visiting.  Мария, dog, at bedside.  Will continue to monitor closely.

## 2017-03-23 NOTE — PROGRESS NOTES
"SPIRITUAL HEALTH SERVICES Progress Note  [facility] ICU    Visited pt Marie to provide ongoing  support.  Offered reflective conversation, emotional support, and prayer.    Illness Narrative:  Marie reviewed why she was receiving the chemotherapy and reflected \"Fito is my healer.\"    Coping:  #  A staff member from the [facility] called her; her son-in-law is visiting; and Marie enjoys talking to and joking with the care team.  # She had her pillow and quilt from home.  Marie shared that her quilt has been with her since she started her cancer treatment.  #  She welcomed prayer.    Meaning-Making:    #  Marie reflected that like fishermen, who tied themselves to the ship's mast during a storm, she is tying herself to Fito to weather the storm of her hospitalization.  #  She shared that she has always been outgoing and remembered that her parents took her everywhere they went when she was growing up as an only child.    Plan:  I and  Sabrina will continue to follow pt to provide ongoing emotional/spiritual support while she is a [facility].    Pelon Zamarripa M.Div., [facility]  Staff   Pager [phone]    "

## 2017-03-23 NOTE — PROGRESS NOTES
[facility]  Hospitalist Progress Note  Fide Wilkes MD 03/23/2017    Reason for Stay (Diagnosis): Acute hypoxic respiratory failure         Assessment and Plan:      Summary of Stay: Marie Kline is a 84 year old female with a history of COPD (not on home O2-but does have a concentrator at home), hx of CHF (broken heart syndrome) with normal echo during this admission (no dd or systolic dysfunction), CAD, CLL with severe leukocytosis (baseline is 160-180 range) admitted on 3/17/2017 with acute hypoxic respiratory failure 2/2 COPD exacerbation.     3 days pta she was seen by her primary MD and initiated on azith for suspected bronchitis. Her admission CXR is without e/o infiltrate but notable for central emphysematous changes. Influenza agn negative.  She remains afebrile.  procal not completed, likely due to pta abx. There is no clinical e/o volume overload and echo shows normal cardiac function, and bnp is not elevated.     Given her chronic CLL and concern for possible PE V/Q testing was completed and of indeterminate probability in light of her underlying emphysema so b LE dopplers completed and negative for DVT.  Her WBC notable for being in the 200-250 range (which is not unusual for her in the past during acute medical illnesses)  There was also some concern of melena but guiac testing is negative    Her brief hospitalization has been complicated by hyperkalemia into the 6.5 range, necessitating temporary  transfer to the ICU for K shifting medications, and kayexalate for body elimination. Likely due to transient tumor lysis.  It mirrors when her WBC is in the 250 range.  Discussed with Dr. Ivory and she feels it is now time to start some treatment for CLL with rituximab.  Unfortunately today - her K was 6.1-7.2 prompting kayexalate  and repeat K shifting medications and now in 5.8 range.  Thus far she has not had any EKG changes.  But for this reason held off on rituximab on 3/22/17to try and  normalize total body K (not just shift it) as CLL treatment will likely lead to tumor lysis syndrome worsening K situation. Unfortunately her K continued to rise despite aggressive kayexalate.  But now seems to be pseudohypernatremia in setting of CLL (sticky cells).  Now have two measurements that are hypokalemic.  Therefore completed rituximab on 3/23/17 without difficulty.  I am now replacing her K gingerly as still at risk to develop TLS for next 2-3 days.     She had been slow to improve from a breathing situation but began turning around on 3/21.  Of note baseline RA sats at rest are 90% per her oximeter which she has brought in to have calibrated.  Problem List:   1. Acute hypoxic respiratory failure:  COPD with exacerbation presumed.  VQ completed yesterday indeterminate for PE, but echo without e/o RV overload or pulmonary hypertension, dopplers negative I doubt this is PE.  D-dimer will not be helpful in this situation in light of suspected active infection.  Her sx from this aspect have essentially resolved.  Weaning down on methylpred 20 mg bid to qd for 3 then off.  She continues to require low dose O2 and may need it at discharge  2. Acute COPD with exacerbation-? Due to viral issue vs bronchitis vs environmental irritant.  Initially on  methylpred 40 mg IV q8-given the substantially elevated WBC tapered back to 60 mg IV bid on 3/20/19 and down to 40 mg bid on 3/21 then 20 mg bid, and now to 20 mg qd x 3 then would d/c.  Clearly influencing incredibly high wbc  3. COPD:  home regimen is :  flutic-salmeterol 250-50, prn albuterol.  I see no anticholinergic agent-defer to OP MD  4. CLL:  With severe leukocytosis-380(!)-(her baseline is 160-180 but has been in the 400's some time ago when first diagnosed-never treated), was running in the 200-250 range but now up to 380 so now requires treatment.  Tolerated rituximab without any difficulty today. Check ldh/uric acid/phos to help evaluate for TLS in light  "of all the trouble we are having with K levels  5. Hyperkalemia: initially felt 2/2 tumor lysis-I see no other reason, renal function is wnl/no culprit meds id'd.  Treated with IVF/Insulin-glucose/calcium for K shifting, and furosemide/kayexalate for K removal.  But now looks just like pseudohyperkalemia from CLL-cont q 6 hour venous draws off PICC on ice hand delivered to lab and run stat for most accurate measurement.  K at 2.8 this am and tele showing increasing yet still infrequent PACs, after multiple doses of kayexalate given for spurious readings of potassium - given 20 meq this am, now low at 2.6 this evening so will give additional 20 meq x 1 dose now. Cont q 6 hour draw overnight. Discussed in depth with Dr. Ivory.  If K returns elevated but no EKG changes-check LDH/uric acid/phos and if all normal or close to baseline then the K is likely a falsely elevated result. (due to sticky nature of the CLL cells and rapid dissolution when removed, and subsequent spilling of intracellular K).  If K is < 3, replace judiciously   6. CAD:  Cont statin/BB, but I see NO antiplatelet agent  7. Htn:  On metop xl 25 mg and furosemide 20 mg  DVT Prophylaxis: PCDs  Code Status: Full Code  Discharge Dispo: Unclear  Estimated Disch Date / # of Days until Disch: 3-5 ?? Maybe more    Discussed in depth with patient/Dr. Ivory, Dr. Ovalle/chemo RNs/ICU RNs/evening hospitalist        Interval History (Subjective):      Actually feeling good, no cp or sob, no n/v, po intake is fair.                   Physical Exam:      Last Vital Signs:  /61  Pulse 84  Temp 98.3  F (36.8  C) (Oral)  Resp 24  Ht 1.473 m (4' 9.99\")  Wt 49.2 kg (108 lb 7 oz)  SpO2 90%  BMI 22.67 kg/m2    I/O of questionable accuracy  Tele:  NSR-NO AFIB on review of tele strips    Pleasant nad looks stated age head nc/at sclera clear lungs moderate air movement at best, scattered wheeze persists.  RRR no m/r/g no le edema skin w/d no c/c abd s/nt/nd " alert and oriented affect appropriate fernández            Medications:      All current medications were reviewed with changes reflected in problem list.         Data:      All new lab and imaging data was reviewed.   Labs:    Recent Labs  Lab 03/23/17  1754      POTASSIUM 2.6*   CHLORIDE 104   CO2 33*   ANIONGAP 7   *   BUN 14   CR 0.76   GFRESTIMATED 72   GFRESTBLACK 87   CARLOS 7.1*       Recent Labs  Lab 03/23/17  0505   .7*   HGB 9.6*   HCT 37.5   *   *      Imaging:   B LE dopplers 3/19/17       FINDINGS: Negative. No evidence for DVT. Short term followup  recommended if symptoms persist.

## 2017-03-23 NOTE — PLAN OF CARE
Problem: Goal Outcome Summary  Goal: Goal Outcome Summary  Outcome: No Change  VS stable. 91% on 2L 02. BENJAMIN. Diminished LS with non productive cough. Tele is sinus rhythm with PAC's. Slept on and off throughout the night.

## 2017-03-23 NOTE — PROGRESS NOTES
ICU End of Shift Summary.  For vital signs and complete assessments, please see documentation flowsheets.     Pertinent assessments: transferred to ICU for A-line (unsuccessful placement) and closer monitoring of lytes. Pt a/o. VSS on 3L NC. Oncology nurse here to infuse rituxan. Diane PO. Void adequate. LS dim.   Major Shift Events: See above  Plan (Upcoming Events): Monitor lytes.   Discharge/Transfer Needs: TBD    Bedside Shift Report Completed   Bedside Safety Check Completed

## 2017-03-23 NOTE — PROGRESS NOTES
Rituxan therapy complete @ 1835.  Pt tolerated remainder of infusion without issue.  Pt did report feeling warm at end of infusion- AFVSS- improved after removing covers.  CMP, hepatic panel, and uric acid completed per primary RN.

## 2017-03-23 NOTE — PLAN OF CARE
"Problem: Goal Outcome Summary  Goal: Goal Outcome Summary  Outcome: No Change  RN SHIFT SUMMARY :  DX/STORY : admit of 3/17 from [facility] with SOB , COPD Exac.   HX : CLL Leukemia '07 , gets IVIG about monthly at Oncology, CAD,CHF, COPD, anxiety ,   LABS/PROTOCOLS : KTL=981.7, K has been elevated - per MD's poss. Tumor Lysis syndrome . Checking K q 6 . Was 5.8 at 12n, 6p=5.5 had repeated  kayexalate - next check is 12a   TELE : SR   ASSESS : a/o, up in room with SBA, , many loose light brown stools d/t kayexalate. Loose np cough , wearing 2L nc, RC doing nebs   TEACHING : has good understanding of meds, reviewed POC .  PLAN : at baseline is from [facility] in [place]. Cont. Plan of RC for nebs , steroids, Oncology consulting >>  starting Chemo-Rituxamab in am if K <5 . Checking K at 12 a & again at 5 am              "

## 2017-03-24 LAB
ALBUMIN SERPL-MCNC: 2.2 G/DL (ref 3.4–5)
ALP SERPL-CCNC: 98 U/L (ref 40–150)
ALT SERPL W P-5'-P-CCNC: 25 U/L (ref 0–50)
ANION GAP SERPL CALCULATED.3IONS-SCNC: 10 MMOL/L (ref 3–14)
ANION GAP SERPL CALCULATED.3IONS-SCNC: 10 MMOL/L (ref 3–14)
ANION GAP SERPL CALCULATED.3IONS-SCNC: 9 MMOL/L (ref 3–14)
ANION GAP SERPL CALCULATED.3IONS-SCNC: 9 MMOL/L (ref 3–14)
ANISOCYTOSIS BLD QL SMEAR: SLIGHT
AST SERPL W P-5'-P-CCNC: 15 U/L (ref 0–45)
BASOPHILS # BLD AUTO: 0 10E9/L (ref 0–0.2)
BASOPHILS NFR BLD AUTO: 0 %
BILIRUB SERPL-MCNC: 0.2 MG/DL (ref 0.2–1.3)
BUN SERPL-MCNC: 12 MG/DL (ref 7–30)
BUN SERPL-MCNC: 13 MG/DL (ref 7–30)
BUN SERPL-MCNC: 9 MG/DL (ref 7–30)
BUN SERPL-MCNC: 9 MG/DL (ref 7–30)
CA-I BLD-MCNC: 3.7 MG/DL (ref 4.4–5.2)
CALCIUM SERPL-MCNC: 6 MG/DL (ref 8.5–10.1)
CALCIUM SERPL-MCNC: 7.2 MG/DL (ref 8.5–10.1)
CALCIUM SERPL-MCNC: 7.3 MG/DL (ref 8.5–10.1)
CHLORIDE SERPL-SCNC: 104 MMOL/L (ref 94–109)
CHLORIDE SERPL-SCNC: 106 MMOL/L (ref 94–109)
CHLORIDE SERPL-SCNC: 106 MMOL/L (ref 94–109)
CHLORIDE SERPL-SCNC: 108 MMOL/L (ref 94–109)
CO2 SERPL-SCNC: 28 MMOL/L (ref 20–32)
CO2 SERPL-SCNC: 29 MMOL/L (ref 20–32)
CREAT SERPL-MCNC: 0.69 MG/DL (ref 0.52–1.04)
CREAT SERPL-MCNC: 0.72 MG/DL (ref 0.52–1.04)
CREAT SERPL-MCNC: 0.72 MG/DL (ref 0.52–1.04)
CREAT SERPL-MCNC: 0.75 MG/DL (ref 0.52–1.04)
DIFFERENTIAL METHOD BLD: ABNORMAL
EOSINOPHIL # BLD AUTO: 0 10E9/L (ref 0–0.7)
EOSINOPHIL NFR BLD AUTO: 0 %
ERYTHROCYTE [DISTWIDTH] IN BLOOD BY AUTOMATED COUNT: 17.6 % (ref 10–15)
GFR SERPL CREATININE-BSD FRML MDRD: 74 ML/MIN/1.7M2
GFR SERPL CREATININE-BSD FRML MDRD: 76 ML/MIN/1.7M2
GFR SERPL CREATININE-BSD FRML MDRD: 77 ML/MIN/1.7M2
GFR SERPL CREATININE-BSD FRML MDRD: 81 ML/MIN/1.7M2
GLUCOSE SERPL-MCNC: 133 MG/DL (ref 70–99)
GLUCOSE SERPL-MCNC: 154 MG/DL (ref 70–99)
GLUCOSE SERPL-MCNC: 196 MG/DL (ref 70–99)
GLUCOSE SERPL-MCNC: 315 MG/DL (ref 70–99)
HCT VFR BLD AUTO: 27.2 % (ref 35–47)
HGB BLD-MCNC: 7.8 G/DL (ref 11.7–15.7)
LACTATE BLD-SCNC: 2.5 MMOL/L (ref 0.7–2.1)
LDH SERPL L TO P-CCNC: 139 U/L (ref 81–234)
LYMPHOCYTES # BLD AUTO: 120.4 10E9/L (ref 0.8–5.3)
LYMPHOCYTES NFR BLD AUTO: 92 %
MAGNESIUM SERPL-MCNC: 1.7 MG/DL (ref 1.6–2.3)
MCH RBC QN AUTO: 28.6 PG (ref 26.5–33)
MCHC RBC AUTO-ENTMCNC: 28.7 G/DL (ref 31.5–36.5)
MCV RBC AUTO: 100 FL (ref 78–100)
MONOCYTES # BLD AUTO: 1.3 10E9/L (ref 0–1.3)
MONOCYTES NFR BLD AUTO: 1 %
NEUTROPHILS # BLD AUTO: 9.2 10E9/L (ref 1.6–8.3)
NEUTROPHILS NFR BLD AUTO: 7 %
PHOSPHATE SERPL-MCNC: 2.3 MG/DL (ref 2.5–4.5)
PHOSPHATE SERPL-MCNC: 2.3 MG/DL (ref 2.5–4.5)
PHOSPHATE SERPL-MCNC: 2.6 MG/DL (ref 2.5–4.5)
PHOSPHATE SERPL-MCNC: 2.6 MG/DL (ref 2.5–4.5)
PHOSPHATE SERPL-MCNC: 3.8 MG/DL (ref 2.5–4.5)
PLATELET # BLD AUTO: 68 10E9/L (ref 150–450)
PLATELET # BLD EST: ABNORMAL 10*3/UL
POTASSIUM SERPL-SCNC: 2.8 MMOL/L (ref 3.4–5.3)
POTASSIUM SERPL-SCNC: 2.8 MMOL/L (ref 3.4–5.3)
POTASSIUM SERPL-SCNC: 2.9 MMOL/L (ref 3.4–5.3)
POTASSIUM SERPL-SCNC: 3.3 MMOL/L (ref 3.4–5.3)
POTASSIUM SERPL-SCNC: 3.4 MMOL/L (ref 3.4–5.3)
PROT SERPL-MCNC: 4.1 G/DL (ref 6.8–8.8)
RBC # BLD AUTO: 2.73 10E12/L (ref 3.8–5.2)
SODIUM SERPL-SCNC: 142 MMOL/L (ref 133–144)
SODIUM SERPL-SCNC: 144 MMOL/L (ref 133–144)
SODIUM SERPL-SCNC: 144 MMOL/L (ref 133–144)
SODIUM SERPL-SCNC: 145 MMOL/L (ref 133–144)
URATE SERPL-MCNC: 2.8 MG/DL (ref 2.6–6)
URATE SERPL-MCNC: 2.9 MG/DL (ref 2.6–6)
URATE SERPL-MCNC: 2.9 MG/DL (ref 2.6–6)
URATE SERPL-MCNC: 3.6 MG/DL (ref 2.6–6)
WBC # BLD AUTO: 130.9 10E9/L (ref 4–11)

## 2017-03-24 PROCEDURE — 25000128 H RX IP 250 OP 636: Performed by: INTERNAL MEDICINE

## 2017-03-24 PROCEDURE — 85025 COMPLETE CBC W/AUTO DIFF WBC: CPT | Performed by: INTERNAL MEDICINE

## 2017-03-24 PROCEDURE — 84100 ASSAY OF PHOSPHORUS: CPT | Performed by: INTERNAL MEDICINE

## 2017-03-24 PROCEDURE — 83735 ASSAY OF MAGNESIUM: CPT | Performed by: INTERNAL MEDICINE

## 2017-03-24 PROCEDURE — 25000132 ZZH RX MED GY IP 250 OP 250 PS 637: Mod: GY | Performed by: INTERNAL MEDICINE

## 2017-03-24 PROCEDURE — 99233 SBSQ HOSP IP/OBS HIGH 50: CPT | Performed by: INTERNAL MEDICINE

## 2017-03-24 PROCEDURE — 27210995 ZZH RX 272: Performed by: INTERNAL MEDICINE

## 2017-03-24 PROCEDURE — 25000125 ZZHC RX 250: Performed by: HOSPITALIST

## 2017-03-24 PROCEDURE — 94640 AIRWAY INHALATION TREATMENT: CPT

## 2017-03-24 PROCEDURE — 25000125 ZZHC RX 250: Performed by: INTERNAL MEDICINE

## 2017-03-24 PROCEDURE — 25000132 ZZH RX MED GY IP 250 OP 250 PS 637: Mod: GY | Performed by: HOSPITALIST

## 2017-03-24 PROCEDURE — 80048 BASIC METABOLIC PNL TOTAL CA: CPT | Performed by: INTERNAL MEDICINE

## 2017-03-24 PROCEDURE — 82310 ASSAY OF CALCIUM: CPT | Performed by: INTERNAL MEDICINE

## 2017-03-24 PROCEDURE — A9270 NON-COVERED ITEM OR SERVICE: HCPCS | Mod: GY | Performed by: INTERNAL MEDICINE

## 2017-03-24 PROCEDURE — 84550 ASSAY OF BLOOD/URIC ACID: CPT | Performed by: INTERNAL MEDICINE

## 2017-03-24 PROCEDURE — 83615 LACTATE (LD) (LDH) ENZYME: CPT | Performed by: INTERNAL MEDICINE

## 2017-03-24 PROCEDURE — 80053 COMPREHEN METABOLIC PANEL: CPT | Performed by: INTERNAL MEDICINE

## 2017-03-24 PROCEDURE — A9270 NON-COVERED ITEM OR SERVICE: HCPCS | Mod: GY | Performed by: HOSPITALIST

## 2017-03-24 PROCEDURE — 83605 ASSAY OF LACTIC ACID: CPT | Performed by: HOSPITALIST

## 2017-03-24 PROCEDURE — 82330 ASSAY OF CALCIUM: CPT | Performed by: INTERNAL MEDICINE

## 2017-03-24 PROCEDURE — 94640 AIRWAY INHALATION TREATMENT: CPT | Mod: 76

## 2017-03-24 PROCEDURE — 12000000 ZZH R&B MED SURG/OB

## 2017-03-24 PROCEDURE — 40000275 ZZH STATISTIC RCP TIME EA 10 MIN

## 2017-03-24 RX ORDER — CALCIUM GLUCONATE 94 MG/ML
1 INJECTION, SOLUTION INTRAVENOUS ONCE
Status: DISCONTINUED | OUTPATIENT
Start: 2017-03-24 | End: 2017-03-24

## 2017-03-24 RX ORDER — CEFUROXIME AXETIL 250 MG/1
500 TABLET ORAL EVERY 12 HOURS SCHEDULED
Status: DISCONTINUED | OUTPATIENT
Start: 2017-03-24 | End: 2017-03-27 | Stop reason: HOSPADM

## 2017-03-24 RX ORDER — SODIUM CHLORIDE 450 MG/100ML
INJECTION, SOLUTION INTRAVENOUS CONTINUOUS
Status: DISCONTINUED | OUTPATIENT
Start: 2017-03-24 | End: 2017-03-27

## 2017-03-24 RX ORDER — POTASSIUM CHLORIDE 1500 MG/1
20 TABLET, EXTENDED RELEASE ORAL ONCE
Status: COMPLETED | OUTPATIENT
Start: 2017-03-24 | End: 2017-03-24

## 2017-03-24 RX ORDER — POTASSIUM CHLORIDE 1.5 G/1.58G
20 POWDER, FOR SOLUTION ORAL ONCE
Status: COMPLETED | OUTPATIENT
Start: 2017-03-24 | End: 2017-03-24

## 2017-03-24 RX ORDER — PREDNISONE 20 MG/1
40 TABLET ORAL DAILY
Status: DISCONTINUED | OUTPATIENT
Start: 2017-03-24 | End: 2017-03-27 | Stop reason: HOSPADM

## 2017-03-24 RX ADMIN — DEXTROSE AND SODIUM CHLORIDE: 5; 900 INJECTION, SOLUTION INTRAVENOUS at 00:08

## 2017-03-24 RX ADMIN — METOPROLOL SUCCINATE 25 MG: 25 TABLET, EXTENDED RELEASE ORAL at 08:18

## 2017-03-24 RX ADMIN — IPRATROPIUM BROMIDE AND ALBUTEROL SULFATE 3 ML: .5; 3 SOLUTION RESPIRATORY (INHALATION) at 16:36

## 2017-03-24 RX ADMIN — PREDNISONE 40 MG: 20 TABLET ORAL at 17:02

## 2017-03-24 RX ADMIN — SODIUM CHLORIDE: 4.5 INJECTION, SOLUTION INTRAVENOUS at 17:37

## 2017-03-24 RX ADMIN — POTASSIUM PHOSPHATE, MONOBASIC AND POTASSIUM PHOSPHATE, DIBASIC 20 MMOL: 224; 236 INJECTION, SOLUTION, CONCENTRATE INTRAVENOUS at 01:21

## 2017-03-24 RX ADMIN — POTASSIUM CHLORIDE 20 MEQ: 1500 TABLET, EXTENDED RELEASE ORAL at 08:18

## 2017-03-24 RX ADMIN — IPRATROPIUM BROMIDE AND ALBUTEROL SULFATE 3 ML: .5; 3 SOLUTION RESPIRATORY (INHALATION) at 08:46

## 2017-03-24 RX ADMIN — CEFUROXIME AXETIL 500 MG: 250 TABLET ORAL at 20:32

## 2017-03-24 RX ADMIN — GUAIFENESIN 600 MG: 600 TABLET, EXTENDED RELEASE ORAL at 08:18

## 2017-03-24 RX ADMIN — POTASSIUM CHLORIDE 20 MEQ: 1.5 POWDER, FOR SOLUTION ORAL at 09:39

## 2017-03-24 RX ADMIN — DIPHENHYDRAMINE HYDROCHLORIDE 25 MG: 25 CAPSULE ORAL at 22:41

## 2017-03-24 RX ADMIN — ALLOPURINOL 200 MG: 100 TABLET ORAL at 08:18

## 2017-03-24 RX ADMIN — ATORVASTATIN CALCIUM 20 MG: 20 TABLET, FILM COATED ORAL at 08:18

## 2017-03-24 RX ADMIN — CALCIUM GLUCONATE 1 G: 94 INJECTION, SOLUTION INTRAVENOUS at 08:17

## 2017-03-24 RX ADMIN — LEVOTHYROXINE SODIUM 75 MCG: 75 TABLET ORAL at 08:18

## 2017-03-24 RX ADMIN — IPRATROPIUM BROMIDE AND ALBUTEROL SULFATE 3 ML: .5; 3 SOLUTION RESPIRATORY (INHALATION) at 12:38

## 2017-03-24 RX ADMIN — GUAIFENESIN 600 MG: 600 TABLET, EXTENDED RELEASE ORAL at 22:41

## 2017-03-24 RX ADMIN — ZOLPIDEM TARTRATE 5 MG: 5 TABLET, FILM COATED ORAL at 22:42

## 2017-03-24 RX ADMIN — IPRATROPIUM BROMIDE AND ALBUTEROL SULFATE 3 ML: .5; 3 SOLUTION RESPIRATORY (INHALATION) at 19:58

## 2017-03-24 RX ADMIN — ALLOPURINOL 200 MG: 100 TABLET ORAL at 22:41

## 2017-03-24 RX ADMIN — FLUTICASONE FUROATE AND VILANTEROL TRIFENATATE 1 PUFF: 100; 25 POWDER RESPIRATORY (INHALATION) at 08:46

## 2017-03-24 RX ADMIN — SODIUM CHLORIDE: 4.5 INJECTION, SOLUTION INTRAVENOUS at 08:32

## 2017-03-24 RX ADMIN — FUROSEMIDE 20 MG: 20 TABLET ORAL at 08:18

## 2017-03-24 RX ADMIN — METHYLPREDNISOLONE SODIUM SUCCINATE 20 MG: 40 INJECTION, POWDER, FOR SOLUTION INTRAMUSCULAR; INTRAVENOUS at 08:17

## 2017-03-24 NOTE — PROGRESS NOTES
Oncology/Hematology Follow Up Note:    Assessment and Plan:    Admitted, with , shortness of breath, low pulse ox on 3/17/17      CLL  - Montero Stage III diagnosed in 2007 with lymphocytosis, anemia and thrombocytopenia  - has not been on any cytotoxic treatment  - was on IVIG every month for hypogammaglobulinemia  - her WBC has been in the range of 160-180 k  - rest of labs have been stable.  - with progressive leukocytosis, anemia, and possible tumor lysis will begin rituximab as single agent 3/ 23/17  -potential side effects including allergic reactions and risk of tumor lysis reviewed   -high risk of future tumor lysis, continue allopurinol and consult nephrology( appreciate input).  - WBC improved today.  - No e/o lysis on labs.       Pulmonary  - VQ scan intermediate  - Agree with holding off on IV therapeutic heparin, LE dopplers negative.  - on IV antibiotics and IV steroids.         Increased K  - K appears to rise with her WBC  -  Was on kayexalate  - arterial K , shows K is normal.  - this was likely pseudohyperkalemia from WBC releasing K, and hemolysis within tube.        Anemia and thrombocytopenia  - from CLL  - monitor CBC serially     Leukocytosis  -due to CLL with reactive leukocytosis due to steroids and stress  -monitor serially as we begin rituximab    Code  - full             Subjective:    Feeling better, breathing improved    Scheduled Medications:  Reviewed active medications    Labs:  CBC RESULTS:   Recent Labs   Lab Test  03/24/17   0545  03/23/17   0505  03/22/17   0615   WBC  130.9*  379.7*  253.7*   HGB  7.8*  9.6*  9.1*   HCT  27.2*  37.5  32.5*   MCV  100  103*  100   PLT  68*  140*  113*       CMP  Recent Labs  Lab 03/24/17  0545 03/24/17  0010 03/23/17  1754 03/23/17  1208 03/23/17  0820 03/23/17  0505  03/22/17  0615   * 144 144 142  --  139  --  137   POTASSIUM 2.9* 2.8*  2.8* 2.6* 3.0* 2.8* 6.1*  < > 6.1*   CHLORIDE 108 106 104 102  --  100  --  97   CO2 28 28 33* 32  --  35*  " --  35*   ANIONGAP 9 10 7 8  --  4  --  5   * 154* 174* 149*  --  112*  --  121*   BUN 9 12 14 15  --  17  --  18   CR 0.69 0.72 0.76 0.84  --  0.76  < > 0.71   GFRESTIMATED 81 76 72 64  --  73  < > 79   GFRESTBLACK >90African American GFR Calc >90African American GFR Calc 87 78  --  88  < > >90African American GFR Calc   CARLOS 6.0* 7.3*  7.3* 7.1* 7.6*  --  8.1*  --  7.9*   MAG 1.7  --   --   --  2.4*  --   --   --    PHOS 3.8 2.3*  2.3* 2.6 1.8*  --   --   --   --    PROTTOTAL 4.1*  --   --   --   --  6.0*  --  5.3*   ALBUMIN 2.2*  --  2.9*  --   --  3.3*  --  2.8*   BILITOTAL 0.2  --   --   --   --  0.5  --  0.4   ALKPHOS 98  --   --   --   --  136  --  121   AST 15  --   --   --   --  54*  --  38   ALT 25  --   --   --   --  37  --  24   < > = values in this interval not displayed.    INRNo lab results found in last 7 days.    Objective/Physical Exam:  Blood pressure 123/60, pulse 84, temperature 98.4  F (36.9  C), temperature source Oral, resp. rate 28, height 1.473 m (4' 9.99\"), weight 49.9 kg (110 lb), SpO2 94 %, not currently breastfeeding.  General appearance:alert, oriented, no acute distress  HEENT:sclera anicteric, no pallor, no thrush or mucositis.  Lungs: chest is clear to auscultation, no rales or rhonchi appreciated.  Abdomen: soft, NT, ND , BS normal  Ext: no edema or rashes    Isabel Ivory MD  [facility]  3/24/2017 8:43 AM      "

## 2017-03-24 NOTE — PROGRESS NOTES
"SPIRITUAL HEALTH SERVICES Progress Note  [facility]     Visited Marie per follow up for a reqeust for daily  visits. Marie stated that she was very tired today from the chemotherapy and had family coming by later. She did state about her chemo that she \"tied herself to the masthead and Fito carried her through the storm.\" (a metaphor from our last visit) The on-call  for tomorrow (3/25) will follow up.       Edgard Bennett  Chaplain Resident  Pager [phone]    "

## 2017-03-24 NOTE — CONSULTS
"CLINICAL NUTRITION SERVICES  -  ASSESSMENT NOTE      Recommendations Ordered by Registered Dietitian (RD):   -Regular diet  -PLUS2 shakes + 2 pkts beneprotein between meals BID          REASON FOR ASSESSMENT  Marie Kline is a 84 year old female seen by Registered Dietitian for LOS      NUTRITION HISTORY  - Information obtained from patient  - Patient is on a regular diet at home ([facility])  - Patient notes that she receives 20 meals/month at living facility. She does her own cooking for other meals.   - Typical food/fluid intake: Patient reports that appetite/intake has been good PTA. She denies any recent changes to appetite/intake. She continues to take magic mouthwash (swish and swallow) for history of oral and esophageal candida    - Supplements - None at this time with improve PO at home   - Patient with history of COPD, CHF, CAD and CLL       CURRENT NUTRITION ORDERS  Diet Order:     Regular     Current Intake/Tolerance:  -Patient reports that her appetite has been decreased since admission; however she reports that it is starting to improve. She notes that her granddaughter (Becki) brought her a cinnamon roll with 3 pats of butter and coffee this AM for breakfast and she ate it all.   -Patient also comments that she does not like the food here and family has been brining in meals/snacks.   -Per nutrition flowsheet patient has been eating % of meals TID     PHYSICAL FINDINGS  Observed  -Overall thin appearance with protruding clavicle and acromion process   -Anticipate moderate muscle wasting to scapular, acromion and clavicle regions  Obtained from Chart/Interdisciplinary Team  -Bruised / ecchymotic skin     ANTHROPOMETRICS  Height: 5'0\"  Weight: 110 lbs 0 oz (49.9 kg)  Body mass index is 23 kg/(m^2).  Weight Status:  Normal BMI  IBW: 45.45 kg   % IBW: 110%  Weight History: The data below suggests that patient's weight has been stable at 48 kg +/- 1 kg over the past seven months. "   Wt Readings from Last 10 Encounters:   03/24/17 49.9 kg (110 lb)   01/16/17 48.5 kg (107 lb)   12/29/16 48.8 kg (107 lb 8 oz)   10/24/16 49 kg (108 lb 1.6 oz)   09/15/16 47.6 kg (105 lb)   09/13/16 47.6 kg (105 lb)   08/22/16 48.5 kg (107 lb)   08/15/16 48.9 kg (107 lb 14.4 oz)   07/01/16 46.3 kg (102 lb)   06/28/16 46.7 kg (103 lb)   ]    LABS  Labs reviewed  -Sodium - 145 mmol/L (H)  -K+ - 2.9 mmol/L (L)    MEDICATIONS  Medications reviewed  -Completed Rituximab on 3/23 for CLL  -Lasix - 20 mg daily   -Methylprednisolone - 20 mg BID  -D5 and 0.9% NaCl @ 150 ml/hr x 24 hours (612 kcals from dex)    Dosing Weight: 50 kg (admit weight)    ASSESSED NUTRITION NEEDS:  Estimated Energy Needs: 3002-8646 kcals (25-30+ Kcal/Kg)  Justification: maintenance  Estimated Protein Needs:  grams protein (1.2-2 g pro/Kg)  Justification: maintenance with multiple chronic conditions  Estimated Fluid Needs: 5873-6137  mL (1 mL/Kcal)  Justification: maintenance    MALNUTRITION:  % Weight Loss:  None noted  % Intake:  No decreased intake noted  Subcutaneous Fat Loss:  None observed  Muscle Loss:  Clavicle bone region mild-moderate depletion, Acromion bone region moderate depletion and Scapular bone region moderate depletion  Fluid Retention:  None noted    Malnutrition Diagnosis: Patient does not meet two of the above criteria necessary for diagnosing malnutrition; however patient at risk for malnutrition given chronic illnesses     NUTRITION DIAGNOSIS:  Predicted suboptimal nutrient intake (protein-energy) related to decreased appetite with potential for decline in intake      NUTRITION INTERVENTIONS  Recommendations / Nutrition Prescription  1. Continue regular diet --> encourage protein intake  2. PLUS2 shakes + 2 pkts beneprotein between meals BID  3. Calcium / vitamin D supplementation with chronic steroid use     Implementation  Nutrition education: Per Provider order if indicated   Composition of Meals and Snacks -  Encouraged patient to continue with regular meals TID. Reviewed the importance of adequate protein intake and encouraged patient to consume at least one source of protein at each meal     Medical Food Supplement - Reviewed the benefit of nutritional supplements. Ordered PLUS2 shake + 2 pkts beneprotein between meals BID     Collaboration and Referral of Nutrition care - Discussed patient during interdisciplinary rounds. Also spoke with RN regarding above recommendations / interventions.     Nutrition Goals  Pt to consistently consume >/=50% of meals TID + 2 supplements/day       MONITORING AND EVALUATION:  Food intake and Liquid meal replacement or supplement - Adequacy of intake (% of meals, supplements + content)    Laila Richards RD, LD  Clinical Dietitian  3rd Floor/ICU Pager: [phone]  All Other Floors Pager: [phone]  Weekend/Holiday Pager: [phone]

## 2017-03-24 NOTE — PROGRESS NOTES
[facility]  Hospitalist Progress Note  Patient Name: Marie Kline    MRN: [id number]  Provider: Sha Ponce MD  03/24/17    Initial presenting complaint/issue to hospital (Diagnosis): shortness of breath         Assessment and Plan:      Summary of Stay: Marie Kline is an 84 year old female with history of COPD (not on home O2-but does have a concentrator at home), hx of CHF (broken heart syndrome) with normal echo during this admission (no dd or systolic dysfunction), CAD, and CLL with severe leukocytosis (baseline is 160-180 range).  She presented to the ED on 3/17/2017 for evaluation of shortness of breath.  ED work up showed no evidence of pneumonia, CHF, or influenza. WBC count was 207,000 which is near her baseline. Clinically she had COPD exacerbation. She required BIPAP in the ED. She was admitted to the ICU with acute hypoxic respiratory failure 2/2 COPD exacerbation. She was treated with empiric antibiotics (Rocephin and Zithromax), BIPAP, nebs, solumedrol, and supplemental oxygen.  After admission, VQ scan was performed which was interpreted as intermediate probability.  Lower extremity doppler ultrasounds showed no evidence of DVT.  Breathing improved with this treatment. She has weaned off of BIPAP.  Hospital course has been complicated by rise of WBC to 380 and hyperkalemia.  The hyperkalemia seemed to be related to rise in WBC and has ultimately been thought to represent pseudohypokalemia due to CLL and severe leukocytosis.  aMrie was seen by Heme/Onc and was started on Rituximab on 3/23 for CLL.  She is being watched for tumor lysis syndrome.      Problem List:   1. Acute hypoxic respiratory failure due to COPD exacerbation, possibly due to acute bronchitis.  This is improving.  Change Solumedrol to oral Prednisone.  Change Rocephin to oral Ceftin and stop after 3 more days.  Continue nebs and supplemental oxygen (wean as tolerated).  She may need supplemental oxygen on discharge.  "  2. CLL, with severe leukocytosis.  S/p Rituximab on 3/23.  Monitor for tumor lysis syndrome.  Nephrology is following, with risk of tumor lysis syndrome. Continue IVF hydration with 1/2 NS.  Continue Allopurinol, per Nephrology.   3. Hyperkalemia, thought to be pseudohyperkalemia due to CLL, severe leukocytosis, and WBC lysis in test tube.  Seems better. Continue to monitor.   4. CAD: Cont statin/BB (not on antiplatelet drug with CLL).  5. Htn: On metop xl 25 mg and furosemide 20 mg daily  6. Physical deconditioning.  PT and OT consults have been requested.     DVT Prophylaxis: PCDs  Code Status: Full Code  Discharge Dispo: she hopes to go back to her apartment at the [facility]  Estimated Disch Date / # of Days until Disch: 2-3 more, at least.        Interval History:      Patient is feeling well.  SOB is much improved.  She has not walked much                  Physical Exam:      Last Vital Signs:  /87  Pulse 84  Temp 98.2  F (36.8  C) (Oral)  Resp (!) 39  Ht 1.473 m (4' 9.99\")  Wt 49.9 kg (110 lb)  SpO2 94%  BMI 23 kg/m2    Intake/Output Summary (Last 24 hours) at 03/24/17 1632  Last data filed at 03/24/17 1600   Gross per 24 hour   Intake           4722.5 ml   Output             2070 ml   Net           2652.5 ml       GENERAL:  Comfortable. Cooperative.  PSYCH: pleasant, oriented, No acute distress.  EYES: PERRLA, Normal conjunctiva.  HEART:  Regular rate and rhythm. No JVD. Pulses normal. No edema.  LUNGS:  Clear to auscultation, normal Respiratory effort.  ABDOMEN:  Soft, no hepatosplenomegaly, normal bowel sounds.  EXTREMETIES: No clubbing, cyanosis or ischemia  SKIN:  Dry to touch, No rash.           Medications:      All current medications were reviewed.         Data:      All new lab and imaging data was reviewed.   Labs:       Lab Results   Component Value Date     03/24/2017     03/24/2017     03/24/2017    Lab Results   Component Value Date    CHLORIDE 106 03/24/2017    " CHLORIDE 108 03/24/2017    CHLORIDE 106 03/24/2017    Lab Results   Component Value Date    BUN 9 03/24/2017    BUN 9 03/24/2017    BUN 12 03/24/2017      Lab Results   Component Value Date    POTASSIUM 3.4 03/24/2017    POTASSIUM 2.9 03/24/2017    POTASSIUM 2.8 03/24/2017    POTASSIUM 2.8 03/24/2017    Lab Results   Component Value Date    CO2 28 03/24/2017    CO2 28 03/24/2017    CO2 28 03/24/2017    Lab Results   Component Value Date    CR 0.72 03/24/2017    CR 0.69 03/24/2017    CR 0.72 03/24/2017          Recent Labs  Lab 03/24/17  0545 03/23/17  0505 03/22/17  0615   .9* 379.7* 253.7*   HGB 7.8* 9.6* 9.1*   HCT 27.2* 37.5 32.5*    103* 100   PLT 68* 140* 113*

## 2017-03-24 NOTE — PROGRESS NOTES
" Renal Medicine Progress Note            Assessment/Plan:     1. CLL   -one dose of Rituxan 3/23   -WBC improved 379k--->131K?   -continue to monitor for TLS labs   -change NS to 1/2 NS and allopurinol   -avoid replacing K and phos given that we are watching for TLS    2. COPD exacerbation   -on taper steroid    3. DLD   -statin    4. Hypothyroid   -synthroid      Interval History:     Pt is feel well. She tolerate Rituxan, and WBC drastically improved from 379K--->131K? She denies worsening SOB. Calcium/phos/K are okay. She has good urine output.          Medications and Allergies:       albuterol  2.5 mg Nebulization Once     allopurinol  200 mg Oral BID     diphenhydrAMINE  25 mg Oral Once     sodium chloride (PF)  10 mL Intracatheter Q7 Days     methylPREDNISolone  20 mg Intravenous BID     furosemide  40 mg Intravenous Once     cefTRIAXone  1 g Intravenous Q24H     sodium chloride (PF)  3 mL Intracatheter Q8H     atorvastatin  20 mg Oral Daily     diphenhydrAMINE  25 mg Oral At Bedtime     furosemide  20 mg Oral Daily     metoprolol  25 mg Oral Daily     levothyroxine  75 mcg Oral Daily     ipratropium - albuterol 0.5 mg/2.5 mg/3 mL  3 mL Nebulization 4x daily     guaiFENesin  600 mg Oral BID     fluticasone-vilanterol  1 puff Inhalation Daily        Allergies   Allergen Reactions     Contrast Dye Hives            Physical Exam:   Vitals were reviewed  Heart Rate: 88, Blood pressure 141/77, pulse 84, temperature 98.4  F (36.9  C), temperature source Oral, resp. rate 25, height 1.473 m (4' 9.99\"), weight 49.9 kg (110 lb), SpO2 92 %, not currently breastfeeding.    Wt Readings from Last 3 Encounters:   03/24/17 49.9 kg (110 lb)   01/16/17 48.5 kg (107 lb)   12/29/16 48.8 kg (107 lb 8 oz)       Intake/Output Summary (Last 24 hours) at 03/24/17 1237  Last data filed at 03/24/17 1000   Gross per 24 hour   Intake             5150 ml   Output             2170 ml   Net             2980 ml       GENERAL APPEARANCE: " Pleasant  HEENT:  Eyes/ears/nose/neck grossly normal  RESP: + mild wheezes.   CV: RRR, nl S1/S2, no m/r/g   ABDOMEN: soft, NT  EXTREMITIES/SKIN: no rashes/lesions on observed skin; no edema  Neuro: a/o x3         Data:     CBC RESULTS:     Recent Labs  Lab 03/24/17  0545 03/23/17  0505 03/22/17  0615 03/21/17  0631 03/20/17  0554 03/19/17  1609   .9* 379.7* 253.7* 227.5* 213.2* 282.1*   RBC 2.73* 3.65* 3.26* 3.17* 3.24* 3.54*   HGB 7.8* 9.6* 9.1* 8.9* 9.2* 9.6*   HCT 27.2* 37.5 32.5* 31.5* 32.2* 35.1   PLT 68* 140* 113* 105* 108* 130*       Basic Metabolic Panel:    Recent Labs  Lab 03/24/17  1150 03/24/17  0545 03/24/17  0010 03/23/17  1754 03/23/17  1208 03/23/17  0820 03/23/17  0505    145* 144 144 142  --  139   POTASSIUM 3.4 2.9* 2.8*  2.8* 2.6* 3.0* 2.8* 6.1*   CHLORIDE 106 108 106 104 102  --  100   CO2 28 28 28 33* 32  --  35*   BUN 9 9 12 14 15  --  17   CR 0.72 0.69 0.72 0.76 0.84  --  0.76   * 315* 154* 174* 149*  --  112*   CARLOS 7.2* 6.0* 7.3*  7.3* 7.1* 7.6*  --  8.1*       INRNo lab results found in last 7 days.   Attestation:   I have reviewed today's relevant vital signs, notes, medications, labs and imaging.    Ricky Spangler MD  [facility] - Nephrology  Office phone :[phone]  Pager: [phone]

## 2017-03-24 NOTE — PROGRESS NOTES
ICU End of Shift Summary.  For vital signs and complete assessments, please see documentation flowsheets.     Pertinent assessments: LS diminished with fine crackles. Nasal cannula 3L. Dyspnea on exertion. Afebrile. Up to bathroom with SBA. Adequate urine output. 20 mmol K Phos given for K 2.8 and Phos 2.3. Morning labs pending. Oncology will revaluate for rituxan infusion.  Major Shift Events: Slept well overnight. VSS  Plan (Upcoming Events): Evaluate for rituxan infusion  Discharge/Transfer Needs: TBD    Bedside Shift Report Completed   Bedside Safety Check Completed

## 2017-03-24 NOTE — PLAN OF CARE
Problem: Goal Outcome Summary  Goal: Goal Outcome Summary  Outcome: Improving  ICU End of Shift Summary.  For vital signs and complete assessments, please see documentation flowsheets.      Pertinent assessments:  VSS,  Ambulates with 1 assist to bathroom. Replaced potassium this shift. Pt in good spirits.   Major Shift Events: Up to chair. Enjoys chocolate shakes. On 2 L N.C.   Plan (Upcoming Events):  Tele med surg orders placed. Plan to transfer to med surg when a bed becomes available. Continue to monitor labs.   Discharge/Transfer Needs:  Home, TBD when.     Bedside Shift Report Completed   Bedside Safety Check Completed

## 2017-03-25 ENCOUNTER — APPOINTMENT (OUTPATIENT)
Dept: OCCUPATIONAL THERAPY | Facility: CLINIC | Age: 82
DRG: 189 | End: 2017-03-25
Payer: MEDICARE

## 2017-03-25 ENCOUNTER — APPOINTMENT (OUTPATIENT)
Dept: PHYSICAL THERAPY | Facility: CLINIC | Age: 82
DRG: 189 | End: 2017-03-25
Payer: MEDICARE

## 2017-03-25 LAB
ANION GAP SERPL CALCULATED.3IONS-SCNC: 6 MMOL/L (ref 3–14)
ANION GAP SERPL CALCULATED.3IONS-SCNC: 8 MMOL/L (ref 3–14)
ANISOCYTOSIS BLD QL SMEAR: SLIGHT
BASOPHILS # BLD AUTO: 0 10E9/L (ref 0–0.2)
BASOPHILS NFR BLD AUTO: 0 %
BUN SERPL-MCNC: 12 MG/DL (ref 7–30)
BUN SERPL-MCNC: 13 MG/DL (ref 7–30)
CALCIUM SERPL-MCNC: 7.1 MG/DL (ref 8.5–10.1)
CALCIUM SERPL-MCNC: 7.3 MG/DL (ref 8.5–10.1)
CALCIUM SERPL-MCNC: 8 MG/DL (ref 8.5–10.1)
CALCIUM SERPL-MCNC: 8 MG/DL (ref 8.5–10.1)
CHLORIDE SERPL-SCNC: 103 MMOL/L (ref 94–109)
CHLORIDE SERPL-SCNC: 105 MMOL/L (ref 94–109)
CO2 SERPL-SCNC: 29 MMOL/L (ref 20–32)
CO2 SERPL-SCNC: 31 MMOL/L (ref 20–32)
CREAT SERPL-MCNC: 0.63 MG/DL (ref 0.52–1.04)
CREAT SERPL-MCNC: 0.65 MG/DL (ref 0.52–1.04)
DIFFERENTIAL METHOD BLD: ABNORMAL
EOSINOPHIL # BLD AUTO: 0 10E9/L (ref 0–0.7)
EOSINOPHIL NFR BLD AUTO: 0 %
ERYTHROCYTE [DISTWIDTH] IN BLOOD BY AUTOMATED COUNT: 18 % (ref 10–15)
GFR SERPL CREATININE-BSD FRML MDRD: 86 ML/MIN/1.7M2
GFR SERPL CREATININE-BSD FRML MDRD: ABNORMAL ML/MIN/1.7M2
GLUCOSE SERPL-MCNC: 108 MG/DL (ref 70–99)
GLUCOSE SERPL-MCNC: 128 MG/DL (ref 70–99)
HCT VFR BLD AUTO: 28.5 % (ref 35–47)
HGB BLD-MCNC: 8.4 G/DL (ref 11.7–15.7)
LACTATE BLD-SCNC: 1.1 MMOL/L (ref 0.7–2.1)
LYMPHOCYTES # BLD AUTO: 135.9 10E9/L (ref 0.8–5.3)
LYMPHOCYTES NFR BLD AUTO: 92 %
MCH RBC QN AUTO: 29.1 PG (ref 26.5–33)
MCHC RBC AUTO-ENTMCNC: 29.5 G/DL (ref 31.5–36.5)
MCV RBC AUTO: 99 FL (ref 78–100)
MONOCYTES # BLD AUTO: 3 10E9/L (ref 0–1.3)
MONOCYTES NFR BLD AUTO: 2 %
NEUTROPHILS # BLD AUTO: 8.9 10E9/L (ref 1.6–8.3)
NEUTROPHILS NFR BLD AUTO: 6 %
OVALOCYTES BLD QL SMEAR: SLIGHT
PHOSPHATE SERPL-MCNC: 2.3 MG/DL (ref 2.5–4.5)
PHOSPHATE SERPL-MCNC: 2.3 MG/DL (ref 2.5–4.5)
PHOSPHATE SERPL-MCNC: 2.6 MG/DL (ref 2.5–4.5)
PHOSPHATE SERPL-MCNC: 2.7 MG/DL (ref 2.5–4.5)
PLATELET # BLD AUTO: 76 10E9/L (ref 150–450)
PLATELET # BLD EST: ABNORMAL 10*3/UL
POTASSIUM SERPL-SCNC: 3.3 MMOL/L (ref 3.4–5.3)
POTASSIUM SERPL-SCNC: 3.3 MMOL/L (ref 3.4–5.3)
RBC # BLD AUTO: 2.89 10E12/L (ref 3.8–5.2)
SODIUM SERPL-SCNC: 140 MMOL/L (ref 133–144)
SODIUM SERPL-SCNC: 142 MMOL/L (ref 133–144)
URATE SERPL-MCNC: 2.5 MG/DL (ref 2.6–6)
URATE SERPL-MCNC: 2.5 MG/DL (ref 2.6–6)
URATE SERPL-MCNC: 2.8 MG/DL (ref 2.6–6)
WBC # BLD AUTO: 147.7 10E9/L (ref 4–11)

## 2017-03-25 PROCEDURE — 12000007 ZZH R&B INTERMEDIATE

## 2017-03-25 PROCEDURE — 82565 ASSAY OF CREATININE: CPT | Performed by: INTERNAL MEDICINE

## 2017-03-25 PROCEDURE — A9270 NON-COVERED ITEM OR SERVICE: HCPCS | Mod: GY | Performed by: INTERNAL MEDICINE

## 2017-03-25 PROCEDURE — 94640 AIRWAY INHALATION TREATMENT: CPT

## 2017-03-25 PROCEDURE — 40000809 ZZH STATISTIC NO DOCUMENTATION TO SUPPORT CHARGE

## 2017-03-25 PROCEDURE — 40000274 ZZH STATISTIC RCP CONSULT EA 30 MIN

## 2017-03-25 PROCEDURE — 84100 ASSAY OF PHOSPHORUS: CPT | Performed by: INTERNAL MEDICINE

## 2017-03-25 PROCEDURE — 99232 SBSQ HOSP IP/OBS MODERATE 35: CPT | Performed by: INTERNAL MEDICINE

## 2017-03-25 PROCEDURE — 40000193 ZZH STATISTIC PT WARD VISIT: Performed by: PHYSICAL THERAPIST

## 2017-03-25 PROCEDURE — A9270 NON-COVERED ITEM OR SERVICE: HCPCS | Mod: GY | Performed by: HOSPITALIST

## 2017-03-25 PROCEDURE — 97161 PT EVAL LOW COMPLEX 20 MIN: CPT | Mod: GP | Performed by: PHYSICAL THERAPIST

## 2017-03-25 PROCEDURE — 80048 BASIC METABOLIC PNL TOTAL CA: CPT | Performed by: INTERNAL MEDICINE

## 2017-03-25 PROCEDURE — 84550 ASSAY OF BLOOD/URIC ACID: CPT | Performed by: INTERNAL MEDICINE

## 2017-03-25 PROCEDURE — 40000275 ZZH STATISTIC RCP TIME EA 10 MIN

## 2017-03-25 PROCEDURE — 83605 ASSAY OF LACTIC ACID: CPT | Performed by: INTERNAL MEDICINE

## 2017-03-25 PROCEDURE — 25000132 ZZH RX MED GY IP 250 OP 250 PS 637: Mod: GY | Performed by: HOSPITALIST

## 2017-03-25 PROCEDURE — 97535 SELF CARE MNGMENT TRAINING: CPT | Mod: GO | Performed by: OCCUPATIONAL THERAPIST

## 2017-03-25 PROCEDURE — 25000125 ZZHC RX 250: Performed by: INTERNAL MEDICINE

## 2017-03-25 PROCEDURE — 40000133 ZZH STATISTIC OT WARD VISIT: Performed by: OCCUPATIONAL THERAPIST

## 2017-03-25 PROCEDURE — 27210995 ZZH RX 272: Performed by: INTERNAL MEDICINE

## 2017-03-25 PROCEDURE — 85025 COMPLETE CBC W/AUTO DIFF WBC: CPT | Performed by: INTERNAL MEDICINE

## 2017-03-25 PROCEDURE — 82310 ASSAY OF CALCIUM: CPT | Performed by: INTERNAL MEDICINE

## 2017-03-25 PROCEDURE — 97165 OT EVAL LOW COMPLEX 30 MIN: CPT | Mod: GO | Performed by: OCCUPATIONAL THERAPIST

## 2017-03-25 PROCEDURE — 36415 COLL VENOUS BLD VENIPUNCTURE: CPT | Performed by: INTERNAL MEDICINE

## 2017-03-25 PROCEDURE — 97116 GAIT TRAINING THERAPY: CPT | Mod: GP | Performed by: PHYSICAL THERAPIST

## 2017-03-25 PROCEDURE — 25000132 ZZH RX MED GY IP 250 OP 250 PS 637: Mod: GY | Performed by: INTERNAL MEDICINE

## 2017-03-25 PROCEDURE — 94640 AIRWAY INHALATION TREATMENT: CPT | Mod: 76

## 2017-03-25 PROCEDURE — 25000125 ZZHC RX 250: Performed by: HOSPITALIST

## 2017-03-25 RX ADMIN — IPRATROPIUM BROMIDE AND ALBUTEROL SULFATE 3 ML: .5; 3 SOLUTION RESPIRATORY (INHALATION) at 20:18

## 2017-03-25 RX ADMIN — CEFUROXIME AXETIL 500 MG: 250 TABLET ORAL at 08:01

## 2017-03-25 RX ADMIN — IPRATROPIUM BROMIDE AND ALBUTEROL SULFATE 3 ML: .5; 3 SOLUTION RESPIRATORY (INHALATION) at 08:44

## 2017-03-25 RX ADMIN — GUAIFENESIN 600 MG: 600 TABLET, EXTENDED RELEASE ORAL at 08:01

## 2017-03-25 RX ADMIN — PREDNISONE 40 MG: 20 TABLET ORAL at 08:01

## 2017-03-25 RX ADMIN — FLUTICASONE FUROATE AND VILANTEROL TRIFENATATE 1 PUFF: 100; 25 POWDER RESPIRATORY (INHALATION) at 08:45

## 2017-03-25 RX ADMIN — IPRATROPIUM BROMIDE AND ALBUTEROL SULFATE 3 ML: .5; 3 SOLUTION RESPIRATORY (INHALATION) at 15:20

## 2017-03-25 RX ADMIN — ALLOPURINOL 200 MG: 100 TABLET ORAL at 20:40

## 2017-03-25 RX ADMIN — ATORVASTATIN CALCIUM 20 MG: 20 TABLET, FILM COATED ORAL at 08:01

## 2017-03-25 RX ADMIN — DIPHENHYDRAMINE HYDROCHLORIDE 25 MG: 25 CAPSULE ORAL at 22:08

## 2017-03-25 RX ADMIN — SODIUM CHLORIDE: 4.5 INJECTION, SOLUTION INTRAVENOUS at 00:29

## 2017-03-25 RX ADMIN — METOPROLOL SUCCINATE 25 MG: 25 TABLET, EXTENDED RELEASE ORAL at 08:01

## 2017-03-25 RX ADMIN — ZOLPIDEM TARTRATE 5 MG: 5 TABLET, FILM COATED ORAL at 22:08

## 2017-03-25 RX ADMIN — FUROSEMIDE 20 MG: 20 TABLET ORAL at 08:01

## 2017-03-25 RX ADMIN — ALLOPURINOL 200 MG: 100 TABLET ORAL at 08:01

## 2017-03-25 RX ADMIN — SODIUM CHLORIDE: 4.5 INJECTION, SOLUTION INTRAVENOUS at 14:21

## 2017-03-25 RX ADMIN — LEVOTHYROXINE SODIUM 75 MCG: 75 TABLET ORAL at 08:01

## 2017-03-25 RX ADMIN — CEFUROXIME AXETIL 500 MG: 250 TABLET ORAL at 20:38

## 2017-03-25 RX ADMIN — GUAIFENESIN 600 MG: 600 TABLET, EXTENDED RELEASE ORAL at 20:41

## 2017-03-25 RX ADMIN — SODIUM CHLORIDE 1000 ML: 4.5 INJECTION, SOLUTION INTRAVENOUS at 07:17

## 2017-03-25 RX ADMIN — IPRATROPIUM BROMIDE AND ALBUTEROL SULFATE 3 ML: .5; 3 SOLUTION RESPIRATORY (INHALATION) at 11:21

## 2017-03-25 NOTE — PROGRESS NOTES
[facility]    Sepsis Evaluation Progress Note    Date of Service: 03/24/2017    I was called to see Marie Kline due to abnormal vital signs triggering the Sepsis SIRS screening alert. She is not known to have an infection.     Physical Exam    Vital Signs:  Temp: 98.4  F (36.9  C) Temp src: Oral BP: 147/60   Heart Rate: 84 Resp: 22 SpO2: 94 % O2 Device: Nasal cannula Oxygen Delivery: 2 LPM    Lab:  Lactic Acid   Date Value Ref Range Status   03/24/2017 2.5 (H) 0.7 - 2.1 mmol/L Final       The patient is at baseline mental status.    The rest of their physical exam is significant for not further examined    Assessment and Plan    The SIRS and exam findings are likely due to CLL, there is no sign of sepsis at this time.    Disposition: The patient will remain on the current unit. We will continue to monitor this patient closely.    Fide Wilkes MD

## 2017-03-25 NOTE — PROGRESS NOTES
"SPIRITUAL HEALTH SERVICES Progress Note  [facility] Room #503    I had a pleasant and delightful visit with Marie per a daily request for  visits. She had moved to 5th floor from ICU last night. She shared with me her pieces of her medical and family narratives and talked extensively about her grandchildren. Marie indicated more than once that \"God is my healer,\" and \"I am blessed.\"     Marie had no other concerns today and was hoping to get some rest this afternoon. If possible, Marie requests another  visit tomorrow so I will let  staff know.       Cherise Downs   Intern  [phone]  "

## 2017-03-25 NOTE — PROGRESS NOTES
"[facility] RCAT     Date: 3/25/17  Admission Dx: COPD Exac  Pulmonary History COPD  Home Nebulizer/MDI Use: Albuterol Q6 prn, Advair BID  Home Oxygen: prn-as needed  Acuity Level (RCAT flow sheet): 3  Aerosol Therapy initiated: Duoneb QID      Pulmonary Hygiene initiated: Coughing and Deep breathing Techniques      Volume Expansion initiated: IS      Current Oxygen Requirements: RA=92%  Current SpO2:    Re-evaluation date: 3/28/17    Patient Education:   deep breathing and coughing and the importance of this    See \"RT Assessments\" flow sheet for patient assessment scoring and Acuity Level Details.           "

## 2017-03-25 NOTE — PROGRESS NOTES
Oncology/Hematology Follow Up Note:    Assessment and Plan:    Admitted, with , shortness of breath, low pulse ox on 3/17/17      CLL  - Montero Stage III diagnosed in 2007 with lymphocytosis, anemia and thrombocytopenia  - has not been on any cytotoxic treatment  - was on IVIG every month for hypogammaglobulinemia  - her WBC has been in the range of 160-180 k  - rest of labs have been stable.  - with progressive leukocytosis, anemia, and possible tumor lysis will begin rituximab as single agent 3/ 23/17  -potential side effects including allergic reactions and risk of tumor lysis reviewed   -high risk of future tumor lysis, continue allopurinol and consult nephrology( appreciate input).  - WBC improved.  - No e/o lysis on labs.       Pulmonary  - VQ scan intermediate  - Agree with holding off on IV therapeutic heparin, LE dopplers negative.  - on IV antibiotics and IV steroids.         Increased K  - K appears to rise with her WBC  -  Was on kayexalate  - arterial K , shows K is normal.  - this was likely pseudohyperkalemia from WBC releasing K, and hemolysis within tube.        Anemia and thrombocytopenia  - from CLL  - monitor CBC serially     Leukocytosis  -due to CLL with reactive leukocytosis due to steroids and stress  -monitor serially as we begin rituximab    Code  - full             Subjective:    Feeling better, breathing improved was able to ambulate earlier    Scheduled Medications:  Reviewed active medications    Labs:  CBC RESULTS:   Recent Labs   Lab Test  03/24/17   0545  03/23/17   0505  03/22/17   0615   WBC  130.9*  379.7*  253.7*   HGB  7.8*  9.6*  9.1*   HCT  27.2*  37.5  32.5*   MCV  100  103*  100   PLT  68*  140*  113*       CMP  Recent Labs  Lab 03/25/17  1210 03/25/17  0635 03/25/17  0020 03/24/17  1815 03/24/17  1150 03/24/17  0545  03/23/17  1754  03/23/17  0820 03/23/17  0505  03/22/17  0615   NA  --  142 140 142 144 145*  < > 144  < >  --  139  --  137   POTASSIUM  --  3.3* 3.3* 3.3* 3.4  "2.9*  < > 2.6*  < > 2.8* 6.1*  < > 6.1*   CHLORIDE  --  105 103 104 106 108  < > 104  < >  --  100  --  97   CO2  --  31 29 29 28 28  < > 33*  < >  --  35*  --  35*   ANIONGAP  --  6 8 9 10 9  < > 7  < >  --  4  --  5   GLC  --  108* 128* 196* 133* 315*  < > 174*  < >  --  112*  --  121*   BUN  --  12 13 13 9 9  < > 14  < >  --  17  --  18   CR  --  0.65 0.63 0.75 0.72 0.69  < > 0.76  < >  --  0.76  < > 0.71   GFRESTIMATED  --  86 >90Non  GFR Calc 74 77 81  < > 72  < >  --  73  < > 79   GFRESTBLACK  --  >90African American GFR Calc >90Afric American GFR Calc 89 >90Afric American GFR Calc >90African American GFR Calc  < > 87  < >  --  88  < > >90African American GFR Calc   CARLOS 8.0* 7.1* 7.3* 7.3* 7.2* 6.0*  < > 7.1*  < >  --  8.1*  --  7.9*   MAG  --   --   --   --   --  1.7  --   --   --  2.4*  --   --   --    PHOS 2.3* 2.6 2.7 2.6 2.6 3.8  < > 2.6  < >  --   --   --   --    PROTTOTAL  --   --   --   --   --  4.1*  --   --   --   --  6.0*  --  5.3*   ALBUMIN  --   --   --   --   --  2.2*  --  2.9*  --   --  3.3*  --  2.8*   BILITOTAL  --   --   --   --   --  0.2  --   --   --   --  0.5  --  0.4   ALKPHOS  --   --   --   --   --  98  --   --   --   --  136  --  121   AST  --   --   --   --   --  15  --   --   --   --  54*  --  38   ALT  --   --   --   --   --  25  --   --   --   --  37  --  24   < > = values in this interval not displayed.    INRNo lab results found in last 7 days.    Objective/Physical Exam:  Blood pressure 133/52, pulse 87, temperature 98  F (36.7  C), temperature source Oral, resp. rate 20, height 4' 9.99\" (1.473 m), weight 110 lb (49.9 kg), SpO2 92 %, not currently breastfeeding.  General appearance:alert, oriented, no acute distress  HEENT:sclera anicteric, no pallor, no thrush or mucositis.  Lungs: chest is clear to auscultation, no rales or rhonchi appreciated.  Abdomen: soft, NT, ND , BS normal  Ext: no edema or rashes no lymphadenopathy or HSM    Avis Kohli, " MD

## 2017-03-25 NOTE — PLAN OF CARE
Problem: Goal Outcome Summary  Goal: Goal Outcome Summary  PT: Pt had just returned back to bed from sitting up all morning. Would like to rest. PT to re-attempt.

## 2017-03-25 NOTE — PROGRESS NOTES
Pt transferred to room 503 via wheelchair. Accompanied by son in-law. All personal belongings gathered and all questions answered. Report given to nurse Foreman via telephone.

## 2017-03-25 NOTE — PLAN OF CARE
Problem: Goal Outcome Summary  Goal: Goal Outcome Summary  PT: PT eval completed. Pt able to ambulate 200 feet, IV pole support. 2 LOB/veering self corrected. Sats stable on RA, HR elevated 108. Pt may benefit from AD at D/C will continue to work with pt on walking endurance and balance as typically walks her dog 30 mins everyday.

## 2017-03-25 NOTE — PLAN OF CARE
Problem: Goal Outcome Summary  Goal: Goal Outcome Summary  Outcome: Therapy, progress towards functional goals is fair  OT: Initial evaluation complete, treatment started.  Pt admitted with acute hypoxic respiratory failure.  Lives in an independent living unit at [facility].  Pt able to stand from recliner and walk into bathroom where she completed toilet transfer and toileting, standing at the sink to wash her hands and face.  Some coughing and SOB once she got back to the chair.  Pt stated she had taken herself off O2 because she doesn't use it at home.  O2 sats 90% afterwards.  Pt is active in social things at home, does some regular walking.  Appears deconditioned, would benefit from skilled OT to increase endurance and look at EC technqiues for home.  Anticipate pt will DC back to unit at [facility] with family assist as needed.

## 2017-03-25 NOTE — PLAN OF CARE
Problem: Goal Outcome Summary  Goal: Goal Outcome Summary  Outcome: Improving  Pt transferred from ICU this shift. Triggered sepsis protocol-lactic 2.5. No new orders. Regular diet. Fair appetite. PT OT and onc following. Lactic recheck this am as well as TLS labs and CBC diff. Pt up SBA, alert and oriented. Denies pain/nausea.

## 2017-03-25 NOTE — PROGRESS NOTES
" 03/25/17 0959   Quick Adds   Type of Visit Initial Occupational Therapy Evaluation   Living Environment   Lives With alone   Living Arrangements independent living facility  ([facility])   Home Accessibility no concerns   Transportation Available car;family or friend will provide   Living Environment Comment pt has been independent in ADLS    Self-Care   Dominant Hand right   Usual Activity Tolerance moderate   Current Activity Tolerance fair   Regular Exercise yes   Activity/Exercise Type walking   Exercise Amount/Frequency 30 mins;daily   Equipment Currently Used at Home grab bar  (pt has access to shower chair as well)   Activity/Exercise/Self-Care Comment pt states she engages regularly with activities at [facility]   Functional Level Prior   Ambulation 0-->independent   Transferring 0-->independent   Toileting 0-->independent   Bathing 0-->independent   Dressing 0-->independent   Eating 0-->independent   Communication 0-->understands/communicates without difficulty   Swallowing 0-->swallows foods/liquids without difficulty   Cognition 0 - no cognition issues reported   Fall history within last six months yes   Number of times patient has fallen within last six months 1   Which of the above functional risks had a recent onset or change? none   General Information   Onset of Illness/Injury or Date of Surgery - Date 03/24/17   Referring Physician Sha Ponce   Patient/Family Goals Statement pt is planning to return home, states that she has put herself \"in the hands of God\" and has no pain   Additional Occupational Profile Info/Pertinent History of Current Problem Pt admitted with acute hypoxic respiratory failure.  PMH includes COPD, CHF, CAD, CLL, hyperkalemia   General Observations Pt up in a recliner, willing to participate   Cognitive Status Examination   Orientation orientation to person, place and time   Level of Consciousness alert   Able to Follow Commands WNL/WFL   Personal Safety (Cognitive) WNL/WFL " "  Memory intact   Attention No deficits were identified   Visual Perception   Visual Perception Wears glasses   Pain Assessment   Patient Currently in Pain No   Range of Motion (ROM)   ROM Quick Adds No deficits were identified   ROM Comment B UEs   Strength   Manual Muscle Testing Quick Adds No deficits were identified   Strength Comments B UEs   Coordination   Upper Extremity Coordination No deficits were identified   Transfer Skill: Sit to Stand   Level of [place]: Sit/Stand independent   Transfer Skill: Toilet Transfer   Level of [place]: Toilet stand-by assist   Physical Assist/Nonphysical Assist: Toilet supervision;1 person assist   Lower Body Dressing   Level of [place]: Dress Lower Body independent   Toileting   Level of [place]: Toilet independent   Grooming   Level of [place]: Grooming independent   Eating/Self Feeding   Level of [place]: Eating independent   Activities of Daily Living Analysis   Impairments Contributing to Impaired Activities of Daily Living strength decreased  (some coughing with activity)   Clinical Impression   Criteria for Skilled Therapeutic Interventions Met yes, treatment indicated   OT Diagnosis decreased endurance and strength for ADLS   Influenced by the following impairments SOB and coughing with activity, low endurance   Assessment of Occupational Performance 3-5 Performance Deficits   Identified Performance Deficits decreased endurance for dressing, grooming, showering, laundry and meal prep at home   Clinical Decision Making (Complexity) Low complexity   Therapy Frequency daily   Predicted Duration of Therapy Intervention (days/wks) 4 days   Anticipated Discharge Disposition Home;Home with Assist   Risks and Benefits of Treatment have been explained. Yes   Patient, Family & other staff in agreement with plan of care Yes   [facility] AM-PAC TM \"6 Clicks\"   2016, Trustees of [facility], under license to [employer].  All rights " "reserved.   6 Clicks Short Forms Daily Activity Inpatient Short Form   [facility] AM-PAC  \"6 Clicks\" Daily Activity Inpatient Short Form   1. Putting on and taking off regular lower body clothing? 3 - A Little   2. Bathing (including washing, rinsing, drying)? 3 - A Little   3. Toileting, which includes using toilet, bedpan or urinal? 4 - None   4. Putting on and taking off regular upper body clothing? 4 - None   5. Taking care of personal grooming such as brushing teeth? 4 - None   6. Eating meals? 4 - None   Daily Activity Raw Score (Score out of 24.Lower scores equate to lower levels of function) 22   Total Evaluation Time   Total Evaluation Time (Minutes) 15     "

## 2017-03-25 NOTE — PROGRESS NOTES
03/25/17 1446   Quick Adds   Type of Visit Initial PT Evaluation   Living Environment   Lives With alone   Living Arrangements independent living facility  (formerly [facility])   Home Accessibility no concerns   Transportation Available car;family or friend will provide   Self-Care   Dominant Hand right   Usual Activity Tolerance moderate   Regular Exercise yes   Activity/Exercise Type walking   Exercise Amount/Frequency 30 mins;daily   Equipment Currently Used at Home grab bar  (pt has access to shower chair as well)   Activity/Exercise/Self-Care Comment pt states she engages regularly with activities at [facility]   Functional Level Prior   Ambulation 0-->independent   Transferring 0-->independent   Toileting 0-->independent   Bathing 0-->independent   Dressing 0-->independent   Eating 0-->independent   Communication 0-->understands/communicates without difficulty   Swallowing 0-->swallows foods/liquids without difficulty   Cognition 0 - no cognition issues reported   Fall history within last six months yes   Number of times patient has fallen within last six months 1   Which of the above functional risks had a recent onset or change? none   Prior Functional Level Comment Pt recieves 20 meals per month and housekeeping services 2/month.   General Information   Onset of Illness/Injury or Date of Surgery - Date 03/17/17   Referring Physician Dr. Ponce   Patient/Family Goals Statement Pt hoping to D/C back home.    Pertinent History of Current Problem (include personal factors and/or comorbidities that impact the POC) Pt admitted with acute hypoxic respiratory failure.  PMH includes COPD, CHF, CAD, CLL, hyperkalemia   General Info Comments Pt agreeable to PT   Cognitive Status Examination   Orientation orientation to person, place and time   Level of Consciousness alert   Follows Commands and Answers Questions 100% of the time;able to follow multistep instructions   Personal Safety and Judgment intact   Memory intact  "  Pain Assessment   Patient Currently in Pain No   Posture    Posture Forward head position;Protracted shoulders   Range of Motion (ROM)   ROM Comment WFL   Strength   Strength Comments WFL   Transfer Skills   Transfer Comments SBA   Gait   Gait Comments SBA/CGA, use of IV pole. Pt needing B UE support of IV pole. Pt mildly tremulous(per RN likely due to steriod tx). Pt sats stable with ambulation, HR elevated highest at 107 bpm. 200 feet   Balance   Balance Comments 2 small LOB/veering self corrected   General Therapy Interventions   Planned Therapy Interventions gait training;transfer training;risk factor education;home program guidelines;progressive activity/exercise   Clinical Impression   Criteria for Skilled Therapeutic Intervention yes, treatment indicated   PT Diagnosis decreased functional endurance   Influenced by the following impairments increased HR with ambulation, mild SOB with ambulation, decreased balance   Functional limitations due to impairments decreased tolerance to ambulation, possible need for AD at DC   Clinical Presentation Stable/Uncomplicated   Clinical Presentation Rationale improving since admit   Clinical Decision Making (Complexity) Low complexity   Therapy Frequency` daily   Predicted Duration of Therapy Intervention (days/wks) 3 days   Anticipated Equipment Needs at Discharge standard cane  (or FWW pending progress)   Anticipated Discharge Disposition Home with Assist;Home with Home Therapy   Risk & Benefits of therapy have been explained Yes   Patient, Family & other staff in agreement with plan of care Yes   [facility] Super[employer] TM \"6 Clicks\"   2016, Trustees of [facility], under license to [employer].  All rights reserved.   6 Clicks Short Forms Basic Mobility Inpatient Short Form   [facility] [place] SEG NavigationPAC  \"6 Clicks\" V.2 Basic Mobility Inpatient Short Form   1. Turning from your back to your side while in a flat bed without using bedrails? 3 - A Little   2. Moving " from lying on your back to sitting on the side of a flat bed without using bedrails? 3 - A Little   3. Moving to and from a bed to a chair (including a wheelchair)? 3 - A Little   4. Standing up from a chair using your arms (e.g., wheelchair, or bedside chair)? 3 - A Little   5. To walk in hospital room? 3 - A Little   6. Climbing 3-5 steps with a railing? 3 - A Little   Basic Mobility Raw Score (Score out of 24.Lower scores equate to lower levels of function) 18   Total Evaluation Time   Total Evaluation Time (Minutes) 10

## 2017-03-25 NOTE — PROGRESS NOTES
[facility]  Hospitalist Progress Note  Patient Name: Marie Kline    MRN: [id number]  Provider: Sha Ponce MD  03/25/17    Initial presenting complaint/issue to hospital (Diagnosis): shortness of breath         Assessment and Plan:      Summary of Stay: Marie Kline is an 84 year old female with history of COPD (not on home O2-but does have a concentrator at home), hx of CHF (broken heart syndrome) with normal echo during this admission (no dd or systolic dysfunction), CAD, and CLL with severe leukocytosis (baseline is 160-180 range). She presented to the ED on 3/17/2017 for evaluation of shortness of breath. ED work up showed no evidence of pneumonia, CHF, or influenza. WBC count was 207,000 which is near her baseline. Clinically she had COPD exacerbation. She required BIPAP in the ED. She was admitted to the ICU with acute hypoxic respiratory failure 2/2 COPD exacerbation. She was treated with empiric antibiotics (Rocephin and Zithromax), BIPAP, nebs, solumedrol, and supplemental oxygen. After admission, VQ scan was performed which was interpreted as intermediate probability. Lower extremity doppler ultrasounds showed no evidence of DVT. Breathing improved with this treatment. She has weaned off of BIPAP. Hospital course has been complicated by rise of WBC to 380 and hyperkalemia. The hyperkalemia seemed to be related to rise in WBC and has ultimately been thought to represent pseudohypokalemia due to CLL and severe leukocytosis. Marie was seen by Heme/Onc and was started on Rituximab on 3/23 for CLL. She is being watched for tumor lysis syndrome.      Problem List:   1. Acute hypoxic respiratory failure due to COPD exacerbation, possibly due to acute bronchitis. This is improving. Continue Prednisone, Ceftin (2 more days), and supplemental oxygen as needed (wean as tolerated). She may need supplemental oxygen on discharge.   2. CLL, with severe leukocytosis. S/p Rituximab on 3/23. Monitor for  "tumor lysis syndrome. Nephrology is following, with risk of tumor lysis syndrome. Continue IVF hydration with 1/2 NS. Continue Allopurinol, per Nephrology.   3. Hyperkalemia, thought to be pseudohyperkalemia due to CLL, severe leukocytosis, and WBC lysis in test tube. Seems better. Continue to monitor.   4. CAD: Cont statin/BB (not on antiplatelet drug with CLL).  5. Htn: On metoprolol xl 25 mg daily and furosemide 20 mg daily  6. Physical deconditioning. PT and OT consults have been requested.      DVT Prophylaxis: PCDs  Code Status: Full Code  Discharge Dispo: she hopes to go back [facility]  Estimated Disch Date / # of Days until Disch: possible discharge in 2-3 days if no tumor lysis syndrome        Interval History:      Patient is feeling better.  She is a little weak, but not as much as she expected.                   Physical Exam:      Last Vital Signs:  /52  Pulse 87  Temp 98  F (36.7  C) (Oral)  Resp 20  Ht 1.473 m (4' 9.99\")  Wt 49.9 kg (110 lb)  SpO2 92%  BMI 23 kg/m2    Intake/Output Summary (Last 24 hours) at 03/25/17 1629  Last data filed at 03/25/17 0637   Gross per 24 hour   Intake             3790 ml   Output              300 ml   Net             3490 ml       GENERAL:  Comfortable. Cooperative.  PSYCH: pleasant, oriented, No acute distress.  EYES: PERRLA, Normal conjunctiva.  HEART:  Regular rate and rhythm. No JVD. Pulses normal. No edema.  LUNGS:  Clear to auscultation, normal Respiratory effort.  ABDOMEN:  Soft, no hepatosplenomegaly, normal bowel sounds.  EXTREMETIES: No clubbing, cyanosis or ischemia  SKIN:  Dry to touch, No rash.           Medications:      All current medications were reviewed.         Data:      All new lab and imaging data was reviewed.   Labs:       Lab Results   Component Value Date     03/25/2017     03/25/2017     03/24/2017    Lab Results   Component Value Date    CHLORIDE 105 03/25/2017    CHLORIDE 103 03/25/2017 "    CHLORIDE 104 03/24/2017    Lab Results   Component Value Date    BUN 12 03/25/2017    BUN 13 03/25/2017    BUN 13 03/24/2017      Lab Results   Component Value Date    POTASSIUM 3.3 03/25/2017    POTASSIUM 3.3 03/25/2017    POTASSIUM 3.3 03/24/2017    Lab Results   Component Value Date    CO2 31 03/25/2017    CO2 29 03/25/2017    CO2 29 03/24/2017    Lab Results   Component Value Date    CR 0.65 03/25/2017    CR 0.63 03/25/2017    CR 0.75 03/24/2017          Recent Labs  Lab 03/25/17  0635 03/24/17  0545 03/23/17  0505   .7* 130.9* 379.7*   HGB 8.4* 7.8* 9.6*   HCT 28.5* 27.2* 37.5   MCV 99 100 103*   PLT 76* 68* 140*

## 2017-03-26 ENCOUNTER — APPOINTMENT (OUTPATIENT)
Dept: PHYSICAL THERAPY | Facility: CLINIC | Age: 82
DRG: 189 | End: 2017-03-26
Payer: MEDICARE

## 2017-03-26 ENCOUNTER — APPOINTMENT (OUTPATIENT)
Dept: OCCUPATIONAL THERAPY | Facility: CLINIC | Age: 82
DRG: 189 | End: 2017-03-26
Payer: MEDICARE

## 2017-03-26 LAB
ANION GAP SERPL CALCULATED.3IONS-SCNC: 9 MMOL/L (ref 3–14)
ANISOCYTOSIS BLD QL SMEAR: ABNORMAL
BASOPHILS # BLD AUTO: 0 10E9/L (ref 0–0.2)
BASOPHILS NFR BLD AUTO: 0 %
BUN SERPL-MCNC: 16 MG/DL (ref 7–30)
CALCIUM SERPL-MCNC: 7.5 MG/DL (ref 8.5–10.1)
CALCIUM SERPL-MCNC: 7.9 MG/DL (ref 8.5–10.1)
CHLORIDE SERPL-SCNC: 104 MMOL/L (ref 94–109)
CO2 SERPL-SCNC: 27 MMOL/L (ref 20–32)
CREAT SERPL-MCNC: 0.62 MG/DL (ref 0.52–1.04)
CREAT SERPL-MCNC: 0.68 MG/DL (ref 0.52–1.04)
DIFFERENTIAL METHOD BLD: ABNORMAL
EOSINOPHIL # BLD AUTO: 0 10E9/L (ref 0–0.7)
EOSINOPHIL NFR BLD AUTO: 0 %
ERYTHROCYTE [DISTWIDTH] IN BLOOD BY AUTOMATED COUNT: 18.6 % (ref 10–15)
GFR SERPL CREATININE-BSD FRML MDRD: 82 ML/MIN/1.7M2
GFR SERPL CREATININE-BSD FRML MDRD: NORMAL ML/MIN/1.7M2
GLUCOSE SERPL-MCNC: 85 MG/DL (ref 70–99)
HCT VFR BLD AUTO: 33.5 % (ref 35–47)
HGB BLD-MCNC: 9.4 G/DL (ref 11.7–15.7)
LYMPHOCYTES # BLD AUTO: 166.9 10E9/L (ref 0.8–5.3)
LYMPHOCYTES NFR BLD AUTO: 91 %
MCH RBC QN AUTO: 27.6 PG (ref 26.5–33)
MCHC RBC AUTO-ENTMCNC: 28.1 G/DL (ref 31.5–36.5)
MCV RBC AUTO: 98 FL (ref 78–100)
MONOCYTES # BLD AUTO: 1.8 10E9/L (ref 0–1.3)
MONOCYTES NFR BLD AUTO: 1 %
NEUTROPHILS # BLD AUTO: 14.7 10E9/L (ref 1.6–8.3)
NEUTROPHILS NFR BLD AUTO: 8 %
OVALOCYTES BLD QL SMEAR: SLIGHT
PHOSPHATE SERPL-MCNC: 2.2 MG/DL (ref 2.5–4.5)
PHOSPHATE SERPL-MCNC: 2.3 MG/DL (ref 2.5–4.5)
PLATELET # BLD AUTO: 97 10E9/L (ref 150–450)
PLATELET # BLD EST: ABNORMAL 10*3/UL
POTASSIUM SERPL-SCNC: 4.3 MMOL/L (ref 3.4–5.3)
RBC # BLD AUTO: 3.41 10E12/L (ref 3.8–5.2)
SODIUM SERPL-SCNC: 140 MMOL/L (ref 133–144)
URATE SERPL-MCNC: 2.6 MG/DL (ref 2.6–6)
URATE SERPL-MCNC: 2.7 MG/DL (ref 2.6–6)
WBC # BLD AUTO: 183.4 10E9/L (ref 4–11)

## 2017-03-26 PROCEDURE — 25000132 ZZH RX MED GY IP 250 OP 250 PS 637: Mod: GY | Performed by: INTERNAL MEDICINE

## 2017-03-26 PROCEDURE — 97116 GAIT TRAINING THERAPY: CPT | Mod: GP | Performed by: PHYSICAL THERAPY ASSISTANT

## 2017-03-26 PROCEDURE — 84550 ASSAY OF BLOOD/URIC ACID: CPT | Performed by: INTERNAL MEDICINE

## 2017-03-26 PROCEDURE — 27210995 ZZH RX 272: Performed by: INTERNAL MEDICINE

## 2017-03-26 PROCEDURE — 84100 ASSAY OF PHOSPHORUS: CPT | Performed by: INTERNAL MEDICINE

## 2017-03-26 PROCEDURE — 94640 AIRWAY INHALATION TREATMENT: CPT | Mod: 76

## 2017-03-26 PROCEDURE — 94640 AIRWAY INHALATION TREATMENT: CPT

## 2017-03-26 PROCEDURE — 40000133 ZZH STATISTIC OT WARD VISIT

## 2017-03-26 PROCEDURE — A9270 NON-COVERED ITEM OR SERVICE: HCPCS | Mod: GY | Performed by: INTERNAL MEDICINE

## 2017-03-26 PROCEDURE — 82310 ASSAY OF CALCIUM: CPT | Performed by: INTERNAL MEDICINE

## 2017-03-26 PROCEDURE — 97535 SELF CARE MNGMENT TRAINING: CPT | Mod: GO

## 2017-03-26 PROCEDURE — 82947 ASSAY GLUCOSE BLOOD QUANT: CPT | Performed by: INTERNAL MEDICINE

## 2017-03-26 PROCEDURE — 82565 ASSAY OF CREATININE: CPT | Performed by: INTERNAL MEDICINE

## 2017-03-26 PROCEDURE — 25000125 ZZHC RX 250: Performed by: HOSPITALIST

## 2017-03-26 PROCEDURE — 80051 ELECTROLYTE PANEL: CPT | Performed by: INTERNAL MEDICINE

## 2017-03-26 PROCEDURE — 12000007 ZZH R&B INTERMEDIATE

## 2017-03-26 PROCEDURE — 40000193 ZZH STATISTIC PT WARD VISIT: Performed by: PHYSICAL THERAPY ASSISTANT

## 2017-03-26 PROCEDURE — 40000275 ZZH STATISTIC RCP TIME EA 10 MIN

## 2017-03-26 PROCEDURE — 97530 THERAPEUTIC ACTIVITIES: CPT | Mod: GP | Performed by: PHYSICAL THERAPY ASSISTANT

## 2017-03-26 PROCEDURE — 84520 ASSAY OF UREA NITROGEN: CPT | Performed by: INTERNAL MEDICINE

## 2017-03-26 PROCEDURE — 85025 COMPLETE CBC W/AUTO DIFF WBC: CPT | Performed by: INTERNAL MEDICINE

## 2017-03-26 PROCEDURE — 25000125 ZZHC RX 250: Performed by: INTERNAL MEDICINE

## 2017-03-26 PROCEDURE — 25000132 ZZH RX MED GY IP 250 OP 250 PS 637: Mod: GY | Performed by: HOSPITALIST

## 2017-03-26 PROCEDURE — 99232 SBSQ HOSP IP/OBS MODERATE 35: CPT | Performed by: INTERNAL MEDICINE

## 2017-03-26 PROCEDURE — A9270 NON-COVERED ITEM OR SERVICE: HCPCS | Mod: GY | Performed by: HOSPITALIST

## 2017-03-26 RX ADMIN — IPRATROPIUM BROMIDE AND ALBUTEROL SULFATE 3 ML: .5; 3 SOLUTION RESPIRATORY (INHALATION) at 07:07

## 2017-03-26 RX ADMIN — GUAIFENESIN 600 MG: 600 TABLET, EXTENDED RELEASE ORAL at 22:32

## 2017-03-26 RX ADMIN — DIPHENHYDRAMINE HYDROCHLORIDE 25 MG: 25 CAPSULE ORAL at 22:32

## 2017-03-26 RX ADMIN — IPRATROPIUM BROMIDE AND ALBUTEROL SULFATE 3 ML: .5; 3 SOLUTION RESPIRATORY (INHALATION) at 11:19

## 2017-03-26 RX ADMIN — PREDNISONE 40 MG: 20 TABLET ORAL at 08:41

## 2017-03-26 RX ADMIN — SODIUM CHLORIDE: 4.5 INJECTION, SOLUTION INTRAVENOUS at 03:14

## 2017-03-26 RX ADMIN — SODIUM CHLORIDE: 4.5 INJECTION, SOLUTION INTRAVENOUS at 22:38

## 2017-03-26 RX ADMIN — CEFUROXIME AXETIL 500 MG: 250 TABLET ORAL at 22:32

## 2017-03-26 RX ADMIN — ATORVASTATIN CALCIUM 20 MG: 20 TABLET, FILM COATED ORAL at 08:41

## 2017-03-26 RX ADMIN — LEVOTHYROXINE SODIUM 75 MCG: 75 TABLET ORAL at 08:41

## 2017-03-26 RX ADMIN — ZOLPIDEM TARTRATE 5 MG: 5 TABLET, FILM COATED ORAL at 22:33

## 2017-03-26 RX ADMIN — GUAIFENESIN 600 MG: 600 TABLET, EXTENDED RELEASE ORAL at 08:41

## 2017-03-26 RX ADMIN — FUROSEMIDE 20 MG: 20 TABLET ORAL at 08:41

## 2017-03-26 RX ADMIN — FLUTICASONE FUROATE AND VILANTEROL TRIFENATATE 1 PUFF: 100; 25 POWDER RESPIRATORY (INHALATION) at 07:07

## 2017-03-26 RX ADMIN — ALLOPURINOL 200 MG: 100 TABLET ORAL at 08:41

## 2017-03-26 RX ADMIN — IPRATROPIUM BROMIDE AND ALBUTEROL SULFATE 3 ML: .5; 3 SOLUTION RESPIRATORY (INHALATION) at 15:50

## 2017-03-26 RX ADMIN — ALLOPURINOL 200 MG: 100 TABLET ORAL at 22:31

## 2017-03-26 RX ADMIN — IPRATROPIUM BROMIDE AND ALBUTEROL SULFATE 3 ML: .5; 3 SOLUTION RESPIRATORY (INHALATION) at 21:20

## 2017-03-26 RX ADMIN — CEFUROXIME AXETIL 500 MG: 250 TABLET ORAL at 08:41

## 2017-03-26 RX ADMIN — METOPROLOL SUCCINATE 25 MG: 25 TABLET, EXTENDED RELEASE ORAL at 08:41

## 2017-03-26 NOTE — PROGRESS NOTES
[facility]  Hospitalist Progress Note  Patient Name: Marie Kline    MRN: [id number]  Provider: Sha Ponce MD  03/26/17    Initial presenting complaint/issue to hospital (Diagnosis): shortness of breath         Assessment and Plan:      Summary of Stay: Marie Kline is an 84 year old female with history of COPD (not on home O2-but does have a concentrator at home), hx of CHF (broken heart syndrome) with normal echo during this admission (no dd or systolic dysfunction), CAD, and CLL with severe leukocytosis (baseline is 160-180 range). She presented to the ED on 3/17/2017 for evaluation of shortness of breath. ED work up showed no evidence of pneumonia, CHF, or influenza. WBC count was 207,000 which is near her baseline. Clinically she had COPD exacerbation. She required BIPAP in the ED. She was admitted to the ICU with acute hypoxic respiratory failure 2/2 COPD exacerbation. She was treated with empiric antibiotics (Rocephin and Zithromax), BIPAP, nebs, solumedrol, and supplemental oxygen. After admission, VQ scan was performed which was interpreted as intermediate probability. Lower extremity doppler ultrasounds showed no evidence of DVT. Breathing improved with this treatment. She has weaned off of BIPAP. Hospital course has been complicated by rise of WBC to 380 and hyperkalemia. The hyperkalemia seemed to be related to rise in WBC and has ultimately been thought to represent pseudohypokalemia due to CLL and severe leukocytosis. Marie was seen by Heme/Onc and was started on Rituximab on 3/23 for CLL. She is being watched for tumor lysis syndrome. Nephrology was also consulted.        Problem List:   1. Acute hypoxic respiratory failure due to COPD exacerbation, possibly due to acute bronchitis. This is improving. Continue Prednisone, Ceftin (1 more day), and supplemental oxygen as needed (not needing now).    2. CLL, with severe leukocytosis. S/p Rituximab on 3/23. Monitor for tumor lysis  "syndrome- no signs of so far.  Decrease frequency of lab checks (ordered for tomorrow am) Nephrology is following, with risk of tumor lysis syndrome. Continue IVF hydration with 1/2 NS. Continue Allopurinol, per Nephrology. May not need further monitoring after tomorrow. I will discuss with Nephrology how long to keep on Allopruinol.   3. Hyperkalemia, thought to be pseudohyperkalemia due to CLL, severe leukocytosis, and WBC lysis in test tube. Seems better. Continue to monitor.   4. CAD: Cont statin/BB (not on antiplatelet drug with CLL).  5. Htn: On metoprolol xl 25 mg daily and furosemide 20 mg daily  6. Physical deconditioning. PT and OT consults appreciated- PT signed off.      DVT Prophylaxis: PCDs  Code Status: Full Code  Discharge Dispo: she hopes to go back to her apartment at [facility]  Estimated Disch Date / # of Days until Disch: possible discharge in 1-2 days if OK with Heme/Onc and Nephrology        Interval History:      Patient is feeling better- stronger and less short of breath.  Still with some coughing spells.                   Physical Exam:      Last Vital Signs:  /59  Pulse 86  Temp 97  F (36.1  C) (Oral)  Resp 18  Ht 1.473 m (4' 9.99\")  Wt 52.3 kg (115 lb 6.4 oz)  SpO2 93%  BMI 24.13 kg/m2    Intake/Output Summary (Last 24 hours) at 03/26/17 1606  Last data filed at 03/26/17 1000   Gross per 24 hour   Intake             4108 ml   Output                0 ml   Net             4108 ml       GENERAL:  Comfortable. Cooperative. Some coughing spells with walking  PSYCH: pleasant, oriented, No acute distress.  EYES: PERRLA, Normal conjunctiva.  HEART:  Regular rate and rhythm. No JVD. Pulses normal. No edema.  LUNGS:  Clear to auscultation, normal Respiratory effort.  ABDOMEN:  Soft, no hepatosplenomegaly, normal bowel sounds.  EXTREMETIES: No clubbing, cyanosis or ischemia  SKIN:  Dry to touch, No rash.           Medications:      All current medications were reviewed.         Data:   "    All new lab and imaging data was reviewed.   Labs:  No results for input(s): CULT in the last 168 hours.       Lab Results   Component Value Date     03/26/2017     03/25/2017     03/25/2017    Lab Results   Component Value Date    CHLORIDE 104 03/26/2017    CHLORIDE 105 03/25/2017    CHLORIDE 103 03/25/2017    Lab Results   Component Value Date    BUN 16 03/26/2017    BUN 12 03/25/2017    BUN 13 03/25/2017      Lab Results   Component Value Date    POTASSIUM 4.3 03/26/2017    POTASSIUM 3.3 03/25/2017    POTASSIUM 3.3 03/25/2017    Lab Results   Component Value Date    CO2 27 03/26/2017    CO2 31 03/25/2017    CO2 29 03/25/2017    Lab Results   Component Value Date    CR 0.68 03/26/2017    CR 0.62 03/25/2017    CR 0.65 03/25/2017          Recent Labs  Lab 03/26/17  0605 03/25/17  0635 03/24/17  0545   .4* 147.7* 130.9*   HGB 9.4* 8.4* 7.8*   HCT 33.5* 28.5* 27.2*   MCV 98 99 100   PLT 97* 76* 68*

## 2017-03-26 NOTE — PROGRESS NOTES
"SPIRITUAL HEALTH SERVICES Progress Note  [facility]     visited with Marie today in response to her request for daily calvin visits. Her daughter had just returned from [place] and was visiting with her so we kept the visit short. She expressed the karlee she got out a cartoon  Edgard Bennett had shared with her in regard to \"footprints in the sand.\" Marie talked of how her \"reinier in God is what gets her through the storms.\"     She continues to request daily  visits and spiritual health services remain available to her.          Taniya Rollins   Intern  [phone]  "

## 2017-03-26 NOTE — PLAN OF CARE
Problem: Goal Outcome Summary  Goal: Goal Outcome Summary  Outcome: Improving  RR elevated, other VSS  No complaints of pain today   ceftin and prednisone given  Allopurinol, uric acid and calcium levels stable   Up with SBA, ambulating in hallway  Stable on RA   Plan to [place] [facility]

## 2017-03-26 NOTE — PLAN OF CARE
Problem: Goal Outcome Summary  Goal: Goal Outcome Summary  Outcome: Improving  Pt alert and oriented. Up SBA. PICC dressing changed. VSS. Denies pain, nausea. Regular diet. PT, OT, heme onc following. PICC with good blood return.

## 2017-03-26 NOTE — PLAN OF CARE
Problem: Goal Outcome Summary  Goal: Goal Outcome Summary  OT:  Pt educated regarding energy conservation techniques to utilize upon discharge to increase I and participation with ADLs.  Pt issued handouts and all pt questions answered.  Pt demo'd I dressing and grooming tasks.  Pt with no further OT needs as all goals met.  REC:  Home with assist as needed     Occupational Therapy Discharge Summary     Reason for therapy discharge:    All goals and outcomes met, no further needs identified.     Progress towards therapy goal(s). See goals on Care Plan in [facility] electronic health record for goal details.  Goals met     Therapy recommendation(s):    No further therapy is recommended.

## 2017-03-26 NOTE — PLAN OF CARE
Problem: Goal Outcome Summary  Goal: Goal Outcome Summary  PT - Pt discharged from PT with all goals met.  No further PT needs @ this time.  Please refer to discharge summary.

## 2017-03-27 VITALS
SYSTOLIC BLOOD PRESSURE: 136 MMHG | HEIGHT: 58 IN | HEART RATE: 85 BPM | BODY MASS INDEX: 24.54 KG/M2 | RESPIRATION RATE: 18 BRPM | OXYGEN SATURATION: 95 % | WEIGHT: 116.9 LBS | DIASTOLIC BLOOD PRESSURE: 56 MMHG | TEMPERATURE: 98.4 F

## 2017-03-27 DIAGNOSIS — E03.8 OTHER SPECIFIED HYPOTHYROIDISM: ICD-10-CM

## 2017-03-27 LAB
ALBUMIN SERPL-MCNC: 2.4 G/DL (ref 3.4–5)
ALP SERPL-CCNC: 87 U/L (ref 40–150)
ALT SERPL W P-5'-P-CCNC: 27 U/L (ref 0–50)
ANION GAP SERPL CALCULATED.3IONS-SCNC: 6 MMOL/L (ref 3–14)
ANISOCYTOSIS BLD QL SMEAR: SLIGHT
AST SERPL W P-5'-P-CCNC: 23 U/L (ref 0–45)
BASOPHILS # BLD AUTO: 0 10E9/L (ref 0–0.2)
BASOPHILS NFR BLD AUTO: 0 %
BILIRUB SERPL-MCNC: 0.3 MG/DL (ref 0.2–1.3)
BUN SERPL-MCNC: 18 MG/DL (ref 7–30)
CALCIUM SERPL-MCNC: 7.4 MG/DL (ref 8.5–10.1)
CHLORIDE SERPL-SCNC: 104 MMOL/L (ref 94–109)
CO2 SERPL-SCNC: 30 MMOL/L (ref 20–32)
CREAT SERPL-MCNC: 0.71 MG/DL (ref 0.52–1.04)
DIFFERENTIAL METHOD BLD: ABNORMAL
EOSINOPHIL # BLD AUTO: 0 10E9/L (ref 0–0.7)
EOSINOPHIL NFR BLD AUTO: 0 %
ERYTHROCYTE [DISTWIDTH] IN BLOOD BY AUTOMATED COUNT: 17.7 % (ref 10–15)
GFR SERPL CREATININE-BSD FRML MDRD: 78 ML/MIN/1.7M2
GLUCOSE SERPL-MCNC: 83 MG/DL (ref 70–99)
HCT VFR BLD AUTO: 28.4 % (ref 35–47)
HGB BLD-MCNC: 8.3 G/DL (ref 11.7–15.7)
LDH SERPL L TO P-CCNC: 223 U/L (ref 81–234)
LYMPHOCYTES # BLD AUTO: 107.2 10E9/L (ref 0.8–5.3)
LYMPHOCYTES NFR BLD AUTO: 95 %
MCH RBC QN AUTO: 28.4 PG (ref 26.5–33)
MCHC RBC AUTO-ENTMCNC: 29.2 G/DL (ref 31.5–36.5)
MCV RBC AUTO: 97 FL (ref 78–100)
MONOCYTES # BLD AUTO: 0 10E9/L (ref 0–1.3)
MONOCYTES NFR BLD AUTO: 0 %
NEUTROPHILS # BLD AUTO: 5.6 10E9/L (ref 1.6–8.3)
NEUTROPHILS NFR BLD AUTO: 5 %
OVALOCYTES BLD QL SMEAR: SLIGHT
PHOSPHATE SERPL-MCNC: 2.9 MG/DL (ref 2.5–4.5)
PLATELET # BLD AUTO: 87 10E9/L (ref 150–450)
PLATELET # BLD EST: ABNORMAL 10*3/UL
POIKILOCYTOSIS BLD QL SMEAR: SLIGHT
POTASSIUM SERPL-SCNC: 4.4 MMOL/L (ref 3.4–5.3)
PROT SERPL-MCNC: 4.4 G/DL (ref 6.8–8.8)
RBC # BLD AUTO: 2.92 10E12/L (ref 3.8–5.2)
RBC INCLUSIONS BLD: SLIGHT
SODIUM SERPL-SCNC: 140 MMOL/L (ref 133–144)
URATE SERPL-MCNC: 2.5 MG/DL (ref 2.6–6)
WBC # BLD AUTO: 112.8 10E9/L (ref 4–11)

## 2017-03-27 PROCEDURE — 40000257 ZZH STATISTIC CONSULT NO CHARGE VASC ACCESS

## 2017-03-27 PROCEDURE — A9270 NON-COVERED ITEM OR SERVICE: HCPCS | Mod: GY | Performed by: HOSPITALIST

## 2017-03-27 PROCEDURE — 84100 ASSAY OF PHOSPHORUS: CPT | Performed by: INTERNAL MEDICINE

## 2017-03-27 PROCEDURE — 40000275 ZZH STATISTIC RCP TIME EA 10 MIN

## 2017-03-27 PROCEDURE — 25000132 ZZH RX MED GY IP 250 OP 250 PS 637: Mod: GY | Performed by: INTERNAL MEDICINE

## 2017-03-27 PROCEDURE — A9270 NON-COVERED ITEM OR SERVICE: HCPCS | Mod: GY | Performed by: INTERNAL MEDICINE

## 2017-03-27 PROCEDURE — 25000125 ZZHC RX 250: Performed by: HOSPITALIST

## 2017-03-27 PROCEDURE — 94640 AIRWAY INHALATION TREATMENT: CPT | Mod: 76

## 2017-03-27 PROCEDURE — 85025 COMPLETE CBC W/AUTO DIFF WBC: CPT | Performed by: INTERNAL MEDICINE

## 2017-03-27 PROCEDURE — 80053 COMPREHEN METABOLIC PANEL: CPT | Performed by: INTERNAL MEDICINE

## 2017-03-27 PROCEDURE — 25000125 ZZHC RX 250: Performed by: INTERNAL MEDICINE

## 2017-03-27 PROCEDURE — 27210995 ZZH RX 272: Performed by: INTERNAL MEDICINE

## 2017-03-27 PROCEDURE — 25000132 ZZH RX MED GY IP 250 OP 250 PS 637: Mod: GY | Performed by: HOSPITALIST

## 2017-03-27 PROCEDURE — 94640 AIRWAY INHALATION TREATMENT: CPT

## 2017-03-27 PROCEDURE — 84550 ASSAY OF BLOOD/URIC ACID: CPT | Performed by: INTERNAL MEDICINE

## 2017-03-27 PROCEDURE — 83615 LACTATE (LD) (LDH) ENZYME: CPT | Performed by: INTERNAL MEDICINE

## 2017-03-27 PROCEDURE — 99239 HOSP IP/OBS DSCHRG MGMT >30: CPT | Performed by: INTERNAL MEDICINE

## 2017-03-27 RX ORDER — PREDNISONE 10 MG/1
TABLET ORAL
Qty: 18 TABLET | Refills: 0 | Status: SHIPPED | OUTPATIENT
Start: 2017-03-27 | End: 2017-05-26

## 2017-03-27 RX ORDER — GINSENG 100 MG
CAPSULE ORAL
Status: DISCONTINUED | OUTPATIENT
Start: 2017-03-27 | End: 2017-03-27 | Stop reason: HOSPADM

## 2017-03-27 RX ORDER — ALLOPURINOL 100 MG/1
200 TABLET ORAL 2 TIMES DAILY
Qty: 40 TABLET | Refills: 0 | Status: SHIPPED | OUTPATIENT
Start: 2017-03-27 | End: 2017-04-06

## 2017-03-27 RX ADMIN — LEVOTHYROXINE SODIUM 75 MCG: 75 TABLET ORAL at 08:39

## 2017-03-27 RX ADMIN — IPRATROPIUM BROMIDE AND ALBUTEROL SULFATE 3 ML: .5; 3 SOLUTION RESPIRATORY (INHALATION) at 12:10

## 2017-03-27 RX ADMIN — Medication 1 LOZENGE: at 06:14

## 2017-03-27 RX ADMIN — FLUTICASONE FUROATE AND VILANTEROL TRIFENATATE 1 PUFF: 100; 25 POWDER RESPIRATORY (INHALATION) at 07:17

## 2017-03-27 RX ADMIN — ATORVASTATIN CALCIUM 20 MG: 20 TABLET, FILM COATED ORAL at 08:39

## 2017-03-27 RX ADMIN — GUAIFENESIN 600 MG: 600 TABLET, EXTENDED RELEASE ORAL at 08:39

## 2017-03-27 RX ADMIN — IPRATROPIUM BROMIDE AND ALBUTEROL SULFATE 3 ML: .5; 3 SOLUTION RESPIRATORY (INHALATION) at 07:16

## 2017-03-27 RX ADMIN — CEFUROXIME AXETIL 500 MG: 250 TABLET ORAL at 08:39

## 2017-03-27 RX ADMIN — ALLOPURINOL 200 MG: 100 TABLET ORAL at 08:39

## 2017-03-27 RX ADMIN — BACITRACIN ZINC 0.9 G: 500 OINTMENT TOPICAL at 13:24

## 2017-03-27 RX ADMIN — IPRATROPIUM BROMIDE AND ALBUTEROL SULFATE 3 ML: .5; 3 SOLUTION RESPIRATORY (INHALATION) at 04:34

## 2017-03-27 RX ADMIN — METOPROLOL SUCCINATE 25 MG: 25 TABLET, EXTENDED RELEASE ORAL at 08:39

## 2017-03-27 RX ADMIN — SODIUM CHLORIDE: 4.5 INJECTION, SOLUTION INTRAVENOUS at 05:51

## 2017-03-27 RX ADMIN — PREDNISONE 40 MG: 20 TABLET ORAL at 08:39

## 2017-03-27 RX ADMIN — Medication 1 LOZENGE: at 08:43

## 2017-03-27 RX ADMIN — FUROSEMIDE 20 MG: 20 TABLET ORAL at 08:39

## 2017-03-27 NOTE — PLAN OF CARE
Problem: Discharge Planning  Goal: Discharge Planning (Adult, OB, Behavioral, Peds)  Outcome: Adequate for Discharge Date Met:  03/27/17  Discharge education provided to patient and patient verbalized understanding of medications and orders. Patient discharging to home  And transportation provided by daughter.   Pt will follow up with PCP and Dr. Ivory.  Medications filled at [facility] pharmacy.  No further questions at this time.

## 2017-03-27 NOTE — TELEPHONE ENCOUNTER
Levothyroxine     Last Written Prescription Date: 06/08/16  Last Quantity: 90, # refills: 1  Last Office Visit with G, P or [facility] prescribing provider: 12/29/16   Next 5 appointments (look out 90 days)     Mar 28, 2017 11:00 AM CDT   Office Visit with Min Moreno MD   [facility]    [address]  [facility] [id number] [phone]                   TSH   Date Value Ref Range Status   10/24/2016 2.41 0.40 - 4.00 mU/L Final

## 2017-03-27 NOTE — DISCHARGE SUMMARY
"Discharge Summary    Marie Kline MRN# [id number]   YOB: 1932 Age: 84 year old     Date of Admission:  3/17/2017  Date of Discharge:  3/27/2017  Admitting Physician:  Wenceslao Craig DO  Discharge Physician:  Sha Ponce MD  Discharging Service:  Hospitalist     Home clinic: [facility]  Primary Provider: Piper Kiser          Discharge Diagnosis:   1.  Acute hypoxic respiratory failure, due to COPD exacerbation.  2.  CLL, with severe leukocytosis.  S/p initiation of chemotherapy with Rituximab.  3.  Pseudohyperkalemia due to CLL and severe leukocytosis.  4.  Physical deconditioning, resolved with therapy.             Discharge Disposition:   Discharged to home           Allergies:   Allergies   Allergen Reactions     Contrast Dye Hives                Condition on Discharge:   Discharge condition: Stable   Discharge vitals: Blood pressure 136/56, pulse 85, temperature 98.4  F (36.9  C), temperature source Oral, resp. rate 18, height 1.473 m (4' 9.99\"), weight 53 kg (116 lb 14.4 oz), SpO2 95 %, not currently breastfeeding.   Code status on discharge: Full Code   Physical exam on day of discharge:   GENERAL:  Comfortable. Cooperative.  PSYCH: pleasant, oriented, No acute distress.  EYES: PERRLA, Normal conjunctiva.  HEART:  Regular rate and rhythm. No JVD. Pulses normal. No edema.  LUNGS:  Clear to auscultation, normal Respiratory effort.  ABDOMEN:  Soft, no hepatosplenomegaly, normal bowel sounds.  EXTREMETIES: No clubbing, cyanosis or ischemia  SKIN:  Dry to touch, No rash.         History of Present Illness and Hospital Course:     See detailed admission note for full details.  Marie Kline is an 84 year old female with history of COPD (not on home continuous O2-but does have a concentrator at home), hx of CHF (broken heart syndrome) with normal echo during this admission (no dd or systolic dysfunction), CAD, and CLL with severe leukocytosis (baseline is " 160-180 range). She presented to the ED on 3/17/2017 for evaluation of shortness of breath. ED work up showed no evidence of pneumonia, CHF, or influenza. WBC count was 207,000 which is near her baseline. Clinically she had COPD exacerbation. She required BIPAP in the ED. She was admitted to the ICU with acute hypoxic respiratory failure 2/2 COPD exacerbation. She was treated with empiric antibiotics (Rocephin and Zithromax), BIPAP, nebs, solumedrol, and supplemental oxygen. After admission, VQ scan was performed which was interpreted as intermediate probability. Lower extremity doppler ultrasounds showed no evidence of DVT. Breathing improved with this treatment. She weaned off of BIPAP.  She finished courses of Zithromax and Rocephin (with transition to Ceftin). Hospital course was complicated by rise of WBC to 380,000 and hyperkalemia. The hyperkalemia seemed to be related to rise in WBC and was ultimately thought to represent pseudohypokalemia due to CLL and severe leukocytosis. Marie was seen by Heme/Onc and was started on Rituximab on 3/23 for CLL. She was watched for tumor lysis syndrome. Nephrology was also consulted and started Allopurinol.  Marie develop no signs of tumor lysis syndrome or renal failure.  Hospital course was also complicated by physical deconditioning, but this resolved quickly with therapy.  Marie is feeling well. She will discharge home today with a prednisone taper and 10 days of Allopurinol.  She will follow up with Dr. Ivory in Hematology clinic later in the week.  Dr. Ivory can determine if Allopurinol will need to be continued after 10 days.              Procedures / Imaging:   CXR  VQ scan  Bilateral LE doppler US           Consultations:   Consultation during this admission received from hematology/oncology and nephrology             Pending Results:   None           Discharge Instructions and Follow-Up:   Discharge diet: Regular   Discharge activity: Activity as tolerated    Discharge follow-up: Follow up with primary care provider in 1-2 weeks  Follow up with Dr. Ivory later this week   Outpatient therapy: None    Other instructions: Resume prior order for home oxygen.              Discharge Medications:   Current Discharge Medication List      START taking these medications    Details   predniSONE (DELTASONE) 10 MG tablet By mouth, take 30 mg (3 tabs) daily for 3 days, then 20 mg (2 tabs) daily for 3 days, then 10 mg (1 tab) daily for 3 days, then stop  Qty: 18 tablet, Refills: 0    Associated Diagnoses: COPD exacerbation (H)      allopurinol (ZYLOPRIM) 100 MG tablet Take 2 tablets (200 mg) by mouth 2 times daily for 10 days  Qty: 40 tablet, Refills: 0    Associated Diagnoses: CLL (chronic lymphocytic leukemia) (H)         CONTINUE these medications which have NOT CHANGED    Details   ascorbic acid 500 MG TABS Take 500 mg by mouth daily      Acetaminophen (TYLENOL PO) Take 1,000 mg by mouth every 8 hours as needed for mild pain or fever      guaiFENesin (MUCINEX) 600 MG 12 hr tablet Take 600 mg by mouth 2 times daily as needed for congestion      dextromethorphan (DELSYM) 30 MG/5ML liquid Take 60 mg by mouth 2 times daily      calcium carbonate (OS-CARLOS 500 MG [place]) 500 MG tablet Take 500 mg by mouth daily      fluticasone-salmeterol (ADVAIR) 250-50 MCG/DOSE diskus inhaler Inhale 1 puff into the lungs every 12 hours  Qty: 1 Inhaler, Refills: 10    Associated Diagnoses: COPD exacerbation (H)      albuterol (PROAIR HFA/PROVENTIL HFA/VENTOLIN HFA) 108 (90 BASE) MCG/ACT Inhaler Inhale 2 puffs into the lungs every 6 hours as needed for wheezing  Qty: 1 Inhaler, Refills: 10    Associated Diagnoses: Intermittent asthma without complication      atorvastatin (LIPITOR) 20 MG tablet Take 1 tablet (20 mg) by mouth daily  Qty: 90 tablet, Refills: 0    Comments: Medication is being filled for 1 time refill only due to:  Patient needs labs for cholesterol levels.  Associated Diagnoses: ACS  (acute coronary syndrome) (H); Hyperlipidemia with target LDL less than 130      Multiple Vitamins-Minerals (MULTIVITAMIN GUMMIES ADULT PO) Take 1 tablet by mouth daily       furosemide (LASIX) 20 MG tablet Take 1 tablet (20 mg) by mouth daily  Qty: 30 tablet, Refills: 3    Associated Diagnoses: Localized edema      metoprolol (TOPROL-XL) 25 MG 24 hr tablet Take 1 tablet (25 mg) by mouth daily  Qty: 90 tablet, Refills: 2    Associated Diagnoses: ACS (acute coronary syndrome) (H)      traZODone (DESYREL) 50 MG tablet Take 1 tablet (50 mg) by mouth nightly as needed for sleep  Qty: 30 tablet, Refills: 8    Associated Diagnoses: Primary insomnia      albuterol (2.5 MG/3ML) 0.083% nebulizer solution Take 1 vial (2.5 mg) by nebulization every 6 hours as needed for shortness of breath / dyspnea or wheezing  Qty: 360 mL, Refills: 1    Associated Diagnoses: COPD exacerbation (H)      zolpidem (AMBIEN) 5 MG tablet Take 1 tablet (5 mg) by mouth nightly as needed for sleep  Qty: 30 tablet, Refills: 0    Associated Diagnoses: Anxiety      FERROUS GLUCONATE PO Take 324 mg by mouth daily (with breakfast)       levothyroxine (SYNTHROID, LEVOTHROID) 75 MCG tablet Take 1 tablet (75 mcg) by mouth daily  Qty: 90 tablet, Refills: 1    Associated Diagnoses: Other specified hypothyroidism      diphenhydrAMINE (BENADRYL) 25 MG capsule Take 25 mg by mouth At Bedtime       Cholecalciferol (VITAMIN D) 2000 UNITS tablet Take 2,000 Units by mouth daily  Qty: 100 tablet, Refills: 3    Associated Diagnoses: Vitamin D deficiency         STOP taking these medications       NAPROXEN SODIUM PO Comments:   Reason for Stopping:         Azithromycin (ZITHROMAX Z-MEDHAT PO) Comments:   Reason for Stopping:                  Total time spent in face to face contact with the patient and coordinating discharge was:  35 Minutes

## 2017-03-27 NOTE — PLAN OF CARE
Problem: Goal Outcome Summary  Goal: Goal Outcome Summary  Outcome: Improving  Pt alert and oriented. Up ind. PICC with good blood return. Fluids infusing. WBC 12.8 this morning. Regular diet. Good appetite. Throat irritated this morning. Throat lozenge ordered and given. Denies pain, nausea.

## 2017-03-27 NOTE — PROGRESS NOTES
Hematology/Oncology Progress Note    Patient doing well, breathing improved, and no longer requiring O2. Able to ambulate without difficulty, and feels stronger.     Afebrile, VSS.  Alert, comfortable.     LABS today:  .8 (HH)   Hemoglobin 8.3 (L)   Hematocrit 28.4 (L)   Platelet Count 87 (L)     Creatinine 0.71, lytes normal, uric acid 2.5     IMPRESSION:  CLL with a good response to rituximab last week, with no evidence of tumor lysis syndrome. Her pseudohyperkalemia has resolved, and her renal function appears stable. Her COPD exacerbation appears resolved. Discharge plans per Dr. Ponce. She should follow up with Dr. Ivory later this week to decide on whether to proceed with additional doses of rituximab for her progressive CLL.     Omid Fowler M.D.  [facility]  3/27/2017   8:33 AM

## 2017-03-28 ENCOUNTER — CARE COORDINATION (OUTPATIENT)
Dept: CARE COORDINATION | Facility: CLINIC | Age: 82
End: 2017-03-28

## 2017-03-28 ENCOUNTER — TELEPHONE (OUTPATIENT)
Dept: INTERNAL MEDICINE | Facility: CLINIC | Age: 82
End: 2017-03-28

## 2017-03-28 RX ORDER — LEVOTHYROXINE SODIUM 75 UG/1
75 TABLET ORAL DAILY
Qty: 90 TABLET | Refills: 0 | Status: SHIPPED | OUTPATIENT
Start: 2017-03-28 | End: 2017-06-24

## 2017-03-28 NOTE — PROGRESS NOTES
Clinic Care Coordination Contact  Care Team Conversations    Received a Care transition referral.  Reviewed chart and pt was discharged on 3/27 home to her independent living.  Pt was seen today by PC .  Will follow up in next 1-3 business days for follow up.    Farzad Bagley RN/CC  Care Coordinator [facility]  [phone]

## 2017-03-28 NOTE — TELEPHONE ENCOUNTER
Prescription approved per [facility] Refill Protocol.

## 2017-03-28 NOTE — TELEPHONE ENCOUNTER
IP F/U    Date: 03/27/17  Diagnosis: COPD Exacerbation  Is patient active in care coordination? No  Was patient in TCU? No    Next 5 appointments (look out 90 days)     Mar 28, 2017 11:00 AM CDT   Office Visit with Min Moreno MD   [facility] ([facility]    [address]  [facility] [address]   [phone]            Apr 03, 2017  1:00 PM CDT   SHORT with Leah Ventura NP   [facility] ([facility])    [address]  [facility] [address]   [phone]

## 2017-03-28 NOTE — PROGRESS NOTES
Transition Communication Hand-off for Care Transitions to Next Level of Care Provider    Name: Marei Kline  MRN #: [id number]  Primary Care Provider: Piper Kiser  Primary Care MD Name: Dr. Kiser  Primary Clinic: [facility] [address] [facility] [address]  Primary Care Clinic Name: [facility]  Reason for Hospitalization:  CLL (chronic lymphocytic leukemia) (H) [C91.10]  Hypercapnic acidosis [E87.2]  Hypoxia [R09.02]  COPD exacerbation (H) [J44.1]  Admit Date/Time: 3/17/2017  5:12 AM  Discharge Date: 3/27/17  Payor Source: Payor: MEDICARE / Plan: MEDICARE / Product Type: Medicare /     Readmission Assessment Measure (CARLOS MANUEL) Risk Score/category: AVERAGE    Plan of Care Goals/Milestone Events:   Patient Concerns: NEW CLL         Reason for Communication Hand-off Referral: Admission diagnoses: COPD  Fragility  Multiple providers/specialties  Other NEW CLL    Discharge Plan:  Discharge Plan:       Most Recent Value    Disposition Comments DC to [facility]           Concern for non-adherence with plan of care:   NO  Discharge Needs Assessment:  Needs       Most Recent Value    Equipment Currently Used at Home grab bar [pt has access to shower chair as well]    Transportation Available car, family or friend will provide          Already enrolled in Tele-monitoring program and name of program:  NA  Follow-up specialty is recommended: Yes    Follow-up plan:  Future Appointments  Date Time Provider Department Center   4/3/2017 1:00 PM Leah Ventura NP [facility]   5/9/2017 10:20 AM Kar Nuñez MD [facility] U[facility] PHY [place]   5/9/2017 11:00 AM RH INFUSION CHAIR 11 [facility] [place] [facility]       Any outstanding tests or procedures:    Procedures     Future Labs/Procedures    Oxygen Adult     Comments:    Renew Home Oxygen Order  Renew previous prescription.  Expected treatment length is indefinite (99 months).    Attending Provider: Sha Ponce  Physician signature: See electronic signature  associated with these discharge orders  Date of Order: March 27, 2017               Follow-up and recommended labs and tests        Follow up with Dr. Ivory later this week.   Follow up with PCP in 1-2 weeks.                [place] Recommendations:  Frail, copd exac, new diagnosis CLL-will f/u with Dr Ivory [place] onc  Zulay ANDINOJohnathan Wallacerichard  [phone]  Sent via [employer] mgmt to Dr Kiser's RN CTS    AVS/Discharge Summary is the source of truth; this is a helpful guide for improved communication of patient story

## 2017-03-29 ENCOUNTER — CARE COORDINATION (OUTPATIENT)
Dept: CARE COORDINATION | Facility: CLINIC | Age: 82
End: 2017-03-29

## 2017-03-29 NOTE — PROGRESS NOTES
Clinic Care Coordination Contact  [facility] Care Coordination Outreach    Patient was enrolled in [facility] upon  Hospital discharge              Clinical Data:   Pt had a new medication compliance and setting up follow up with PCP.  Pt is scheduled with PCP 4/4.  Pt is taking her swish and swallow for her thrush and this is slowly improving.                          Plan: RN CC will continue to monitor patients responses. Will plan to outreach as needed and with any new variances or concerns.     Farzad Bagley RN/CC  Care Coordinator [facility]  [phone]

## 2017-03-29 NOTE — TELEPHONE ENCOUNTER
Pt has been called by a hospital Care coordinator post hosp, plus our care coordinator.    Maximino MORILLO RN

## 2017-03-30 ENCOUNTER — TELEPHONE (OUTPATIENT)
Dept: INTERNAL MEDICINE | Facility: CLINIC | Age: 82
End: 2017-03-30

## 2017-03-30 ENCOUNTER — MYC MEDICAL ADVICE (OUTPATIENT)
Dept: INTERNAL MEDICINE | Facility: CLINIC | Age: 82
End: 2017-03-30

## 2017-03-30 DIAGNOSIS — B37.0 THRUSH: Primary | ICD-10-CM

## 2017-03-30 RX ORDER — NYSTATIN 100000/ML
500000 SUSPENSION, ORAL (FINAL DOSE FORM) ORAL 4 TIMES DAILY
Qty: 300 ML | Refills: 1 | Status: SHIPPED | OUTPATIENT
Start: 2017-03-30 | End: 2017-08-21

## 2017-03-30 NOTE — TELEPHONE ENCOUNTER
Please see pt's mychart message below and Care Coordinator's message 3-29-17.    Last OV 12-29-16    Has a future appt scheduled.    Please advise, thanks.

## 2017-03-30 NOTE — TELEPHONE ENCOUNTER
Pt called. Stated that she knows she has thrush. Has COPD and was just d/c from hosp. Mouth is very painful. Hoping she can get a rx for something.       Per Fernando-ok to do rx for Nystatin-300mls#      Called pt-relayed above.

## 2017-03-31 ENCOUNTER — TRANSFERRED RECORDS (OUTPATIENT)
Dept: HEALTH INFORMATION MANAGEMENT | Facility: CLINIC | Age: 82
End: 2017-03-31

## 2017-04-03 ENCOUNTER — OFFICE VISIT (OUTPATIENT)
Dept: INTERNAL MEDICINE | Facility: CLINIC | Age: 82
End: 2017-04-03
Payer: MEDICARE

## 2017-04-03 VITALS
SYSTOLIC BLOOD PRESSURE: 100 MMHG | TEMPERATURE: 98.1 F | HEART RATE: 97 BPM | DIASTOLIC BLOOD PRESSURE: 60 MMHG | OXYGEN SATURATION: 90 % | WEIGHT: 102.5 LBS | BODY MASS INDEX: 21.51 KG/M2 | HEIGHT: 58 IN

## 2017-04-03 DIAGNOSIS — C91.10 CLL (CHRONIC LYMPHOCYTIC LEUKEMIA) (H): Primary | ICD-10-CM

## 2017-04-03 DIAGNOSIS — F41.9 ANXIETY: ICD-10-CM

## 2017-04-03 DIAGNOSIS — I50.22 CHRONIC SYSTOLIC CONGESTIVE HEART FAILURE (H): ICD-10-CM

## 2017-04-03 PROCEDURE — 99496 TRANSJ CARE MGMT HIGH F2F 7D: CPT | Performed by: NURSE PRACTITIONER

## 2017-04-03 RX ORDER — LORAZEPAM 0.5 MG/1
0.5 TABLET ORAL EVERY 8 HOURS PRN
Qty: 20 TABLET | Refills: 0 | Status: SHIPPED | OUTPATIENT
Start: 2017-04-03 | End: 2018-01-29

## 2017-04-03 NOTE — NURSING NOTE
"Chief Complaint   Patient presents with     RECHECK       Initial /60  Pulse 97  Temp 98.1  F (36.7  C) (Oral)  Ht 4' 9.9\" (1.471 m)  Wt 102 lb 8 oz (46.5 kg)  SpO2 90%  BMI 21.5 kg/m2 Estimated body mass index is 21.5 kg/(m^2) as calculated from the following:    Height as of this encounter: 4' 9.9\" (1.471 m).    Weight as of this encounter: 102 lb 8 oz (46.5 kg).  Medication Reconciliation: complete    "

## 2017-04-03 NOTE — MR AVS SNAPSHOT
After Visit Summary   4/3/2017    Marie Kline    MRN: [id number]           Patient Information     Date Of Birth          4/28/1932        Visit Information        Provider Department      4/3/2017 1:00 PM Leah Ventura NP [facility]        Today's Diagnoses     CLL (chronic lymphocytic leukemia) (HCC)    -  1    Chronic systolic congestive heart failure (H)        Anxiety           Follow-ups after your visit        Your next 10 appointments already scheduled     May 09, 2017 10:20 AM CDT   Cystoscopy with Kar Nuñez MD, UB CYF   [facility] [place] [facility] ([facility])    [address]  [address]  [facility] [id number] [phone]            May 09, 2017 11:00 AM CDT   Level 1 with RH INFUSION CHAIR 11   [facility] ([facility])    [facility]  [address]  [facility] [id number] [phone]              Who to contact     If you have questions or need follow up information about today's clinic visit or your schedule please contact [facility] directly at [phone].  Normal or non-critical lab and imaging results will be communicated to you by MyChart, letter or phone within 4 business days after the clinic has received the results. If you do not hear from us within 7 days, please contact the clinic through [employer] or phone. If you have a critical or abnormal lab result, we will notify you by phone as soon as possible.  Submit refill requests through [employer] or call your pharmacy and they will forward the refill request to us. Please allow 3 business days for your refill to be completed.          Additional Information About Your Visit        MyChart Information     [employer] gives you secure access to your electronic health record. If you see a primary care provider, you can also send messages to your  "care team and make appointments. If you have questions, please call your primary care clinic.  If you do not have a primary care provider, please call [phone] and they will assist you.        Care EveryWhere ID     This is your Care EveryWhere ID. This could be used by other organizations to access your [facility] medical records  [id number]        Your Vitals Were     Pulse Temperature Height Pulse Oximetry BMI (Body Mass Index)       97 98.1  F (36.7  C) (Oral) 4' 9.9\" (1.471 m) 90% 21.5 kg/m2        Blood Pressure from Last 3 Encounters:   04/03/17 100/60   03/27/17 136/56   12/29/16 120/68    Weight from Last 3 Encounters:   04/03/17 102 lb 8 oz (46.5 kg)   03/27/17 116 lb 14.4 oz (53 kg)   01/16/17 107 lb (48.5 kg)              Today, you had the following     No orders found for display         Today's Medication Changes          These changes are accurate as of: 4/3/17  2:03 PM.  If you have any questions, ask your nurse or doctor.               Start taking these medicines.        Dose/Directions    LORazepam 0.5 MG tablet   Commonly known as:  ATIVAN   Used for:  Anxiety   Started by:  Leah Ventura NP        Dose:  0.5 mg   Take 1 tablet (0.5 mg) by mouth every 8 hours as needed for anxiety   Quantity:  20 tablet   Refills:  0         Stop taking these medicines if you haven't already. Please contact your care team if you have questions.     zolpidem 5 MG tablet   Commonly known as:  AMBIEN   Stopped by:  Leah Ventura NP                Where to get your medicines      Some of these will need a paper prescription and others can be bought over the counter.  Ask your nurse if you have questions.     Bring a paper prescription for each of these medications     LORazepam 0.5 MG tablet                Primary Care Provider Office Phone # Fax #    Piper Kiser [phone]       [facility] [address] [place] [address]        Thank you!     Thank " you for choosing [facility]  for your care. Our goal is always to provide you with excellent care. Hearing back from our patients is one way we can continue to improve our services. Please take a few minutes to complete the written survey that you may receive in the mail after your visit with us. Thank you!             Your Updated Medication List - Protect others around you: Learn how to safely use, store and throw away your medicines at www.disposemymeds.org.          This list is accurate as of: 4/3/17  2:03 PM.  Always use your most recent med list.                   Brand Name Dispense Instructions for use    * albuterol (2.5 MG/3ML) 0.083% neb solution     360 mL    Take 1 vial (2.5 mg) by nebulization every 6 hours as needed for shortness of breath / dyspnea or wheezing       * albuterol 108 (90 BASE) MCG/ACT Inhaler    PROAIR HFA/PROVENTIL HFA/VENTOLIN HFA    1 Inhaler    Inhale 2 puffs into the lungs every 6 hours as needed for wheezing       allopurinol 100 MG tablet    ZYLOPRIM    40 tablet    Take 2 tablets (200 mg) by mouth 2 times daily for 10 days       ascorbic acid 500 MG Tabs      Take 500 mg by mouth daily       atorvastatin 20 MG tablet    LIPITOR    90 tablet    Take 1 tablet (20 mg) by mouth daily       BENADRYL 25 MG capsule   Generic drug:  diphenhydrAMINE      Take 25 mg by mouth At Bedtime       calcium carbonate 500 MG tablet    OS-CARLOS 500 mg [place]. Ca     Take 500 mg by mouth daily       FERROUS GLUCONATE PO      Take 324 mg by mouth daily (with breakfast)       fluticasone-salmeterol 250-50 MCG/DOSE diskus inhaler    ADVAIR    1 Inhaler    Inhale 1 puff into the lungs every 12 hours       furosemide 20 MG tablet    LASIX    30 tablet    Take 1 tablet (20 mg) by mouth daily       levothyroxine 75 MCG tablet    SYNTHROID/LEVOTHROID    90 tablet    Take 1 tablet (75 mcg) by mouth daily       LORazepam 0.5 MG tablet    ATIVAN    20 tablet    Take 1 tablet (0.5 mg) by mouth  every 8 hours as needed for anxiety       metoprolol 25 MG 24 hr tablet    TOPROL-XL    90 tablet    Take 1 tablet (25 mg) by mouth daily       MULTIVITAMIN GUMMIES ADULT PO      Take 1 tablet by mouth daily       nystatin 340812 UNIT/ML suspension    MYCOSTATIN    300 mL    Take 5 mLs (500,000 Units) by mouth 4 times daily       predniSONE 10 MG tablet    DELTASONE    18 tablet    By mouth, take 30 mg (3 tabs) daily for 3 days, then 20 mg (2 tabs) daily for 3 days, then 10 mg (1 tab) daily for 3 days, then stop       traZODone 50 MG tablet    DESYREL    30 tablet    Take 1 tablet (50 mg) by mouth nightly as needed for sleep       vitamin D 2000 UNITS tablet     100 tablet    Take 2,000 Units by mouth daily       * Notice:  This list has 2 medication(s) that are the same as other medications prescribed for you. Read the directions carefully, and ask your doctor or other care provider to review them with you.

## 2017-04-03 NOTE — PROGRESS NOTES
SUBJECTIVE:                                                    Marie Kline is a 84 year old female who presents to clinic today for the following health issues:          Hospital Follow-up Visit:    Hospital/Nursing Home/IP Rehab Facility: [facility]  Date of Admission: 03/17/17  Date of Discharge: 03/27/17  Reason(s) for Admission: thrush, acute hypoxic respiratory failure            Problems taking medications regularly:  None       Medication changes since discharge: None       Problems adhering to non-medication therapy:  None    Summary of hospitalization:  [facility] discharge summary reviewed  Diagnostic Tests/Treatments reviewed.  Follow up needed: oncology, urology  Other Healthcare Providers Involved in Patient s Care:         None  Update since discharge: improved.     Post Discharge Medication Reconciliation: discharge medications reconciled, continue medications without change.  Plan of care communicated with patient and family     Coding guidelines for this visit:  Type of Medical   Decision Making Face-to-Face Visit       within 7 Days of discharge Face-to-Face Visit        within 14 days of discharge   Moderate Complexity 60676 [id number]   High Complexity 42647 39299                Problem list and histories reviewed & adjusted, as indicated.  Additional history: as documented    Patient Active Problem List   Diagnosis     Acute and chronic respiratory failure with hypercapnia (HCC)     Nonischemic cardiomyopathy (H)     Pneumonia     Acute myocardial infarction, initial episode of care (HCC)     CLL (chronic lymphocytic leukemia) (HCC)     CHF (congestive heart failure) (HCC)     Cardiomyopathy in other diseases classified elsewhere (HCC)     Hyperlipidemia with target LDL less than 130     Health Care Home     COPD exacerbation (H)     LESLY (generalized anxiety disorder)     Hypothyroidism     Back pain with radiation     DDD (degenerative disc disease), lumbar     Splenomegaly      Lumbar degenerative disc disease     Lumbar foraminal stenosis     Central spinal stenosis     Bladder cancer (H)     Sepsis (H)     ACP (advance care planning)     Senile osteoporosis     CKD (chronic kidney disease) stage 3, GFR 30-59 ml/min     Purpura senilis (H)     Xerosis cutis     Past Surgical History:   Procedure Laterality Date     CORONARY ANGIOGRAPHY ADULT ORDER  10/2014    minimal CAD     CYSTOSCOPY, BIOPSY BLADDER, COMBINED N/A 2/9/2016    Procedure: COMBINED CYSTOSCOPY, BIOPSY BLADDER;  Surgeon: Kar Nuñez MD;  Location:  OR     CYSTOSCOPY, TRANSURETHRAL RESECTION (TUR) TUMOR BLADDER, COMBINED N/A 2/9/2016    Procedure: COMBINED CYSTOSCOPY, TRANSURETHRAL RESECTION (TUR) TUMOR BLADDER;  Surgeon: Kar Nuñez MD;  Location:  OR     ESOPHAGOSCOPY, GASTROSCOPY, DUODENOSCOPY (EGD), COMBINED N/A 6/22/2016    Procedure: COMBINED ESOPHAGOSCOPY, GASTROSCOPY, DUODENOSCOPY (EGD);  Surgeon: Freddie Diez MD;  Location:  GI       Social History   Substance Use Topics     Smoking status: Former Smoker     Types: Cigarettes     Quit date: 2/3/1996     Smokeless tobacco: Never Used     Alcohol use No     Family History   Problem Relation Age of Onset     CEREBROVASCULAR DISEASE Mother      CANCER Father          Current Outpatient Prescriptions   Medication Sig Dispense Refill     LORazepam (ATIVAN) 0.5 MG tablet Take 1 tablet (0.5 mg) by mouth every 8 hours as needed for anxiety 20 tablet 0     nystatin (MYCOSTATIN) 949557 UNIT/ML suspension Take 5 mLs (500,000 Units) by mouth 4 times daily 300 mL 1     levothyroxine (SYNTHROID/LEVOTHROID) 75 MCG tablet Take 1 tablet (75 mcg) by mouth daily 90 tablet 0     predniSONE (DELTASONE) 10 MG tablet By mouth, take 30 mg (3 tabs) daily for 3 days, then 20 mg (2 tabs) daily for 3 days, then 10 mg (1 tab) daily for 3 days, then stop 18 tablet 0     ascorbic acid 500 MG TABS Take 500 mg by mouth daily       calcium carbonate (OS-CARLOS 500 MG [place].  CA) 500 MG tablet Take 500 mg by mouth daily       fluticasone-salmeterol (ADVAIR) 250-50 MCG/DOSE diskus inhaler Inhale 1 puff into the lungs every 12 hours 1 Inhaler 10     atorvastatin (LIPITOR) 20 MG tablet Take 1 tablet (20 mg) by mouth daily 90 tablet 0     Multiple Vitamins-Minerals (MULTIVITAMIN GUMMIES ADULT PO) Take 1 tablet by mouth daily        furosemide (LASIX) 20 MG tablet Take 1 tablet (20 mg) by mouth daily 30 tablet 3     metoprolol (TOPROL-XL) 25 MG 24 hr tablet Take 1 tablet (25 mg) by mouth daily 90 tablet 2     traZODone (DESYREL) 50 MG tablet Take 1 tablet (50 mg) by mouth nightly as needed for sleep 30 tablet 8     FERROUS GLUCONATE PO Take 324 mg by mouth daily (with breakfast)        allopurinol (ZYLOPRIM) 100 MG tablet Take 2 tablets (200 mg) by mouth 2 times daily for 10 days 40 tablet 0     albuterol (PROAIR HFA/PROVENTIL HFA/VENTOLIN HFA) 108 (90 BASE) MCG/ACT Inhaler Inhale 2 puffs into the lungs every 6 hours as needed for wheezing 1 Inhaler 10     albuterol (2.5 MG/3ML) 0.083% nebulizer solution Take 1 vial (2.5 mg) by nebulization every 6 hours as needed for shortness of breath / dyspnea or wheezing 360 mL 1     diphenhydrAMINE (BENADRYL) 25 MG capsule Take 25 mg by mouth At Bedtime        Cholecalciferol (VITAMIN D) 2000 UNITS tablet Take 2,000 Units by mouth daily 100 tablet 3     BP Readings from Last 3 Encounters:   04/03/17 100/60   03/27/17 136/56   12/29/16 120/68    Wt Readings from Last 3 Encounters:   04/03/17 102 lb 8 oz (46.5 kg)   03/27/17 116 lb 14.4 oz (53 kg)   01/16/17 107 lb (48.5 kg)                    Reviewed and updated as needed this visit by clinical staff  Tobacco  Allergies  Meds  Problems  Med Hx  Surg Hx  Fam Hx  Soc Hx        Reviewed and updated as needed this visit by Provider         ROS:  C: NEGATIVE for fever, chills, change in weight  E/M: NEGATIVE for ear, mouth and throat problems  R: NEGATIVE for significant cough or SOB  CV: NEGATIVE for  "chest pain, palpitations or peripheral edema    OBJECTIVE:                                                    /60  Pulse 97  Temp 98.1  F (36.7  C) (Oral)  Ht 4' 9.9\" (1.471 m)  Wt 102 lb 8 oz (46.5 kg)  SpO2 90%  BMI 21.5 kg/m2  Body mass index is 21.5 kg/(m^2).  GENERAL: frail, elderly female         ASSESSMENT/PLAN:                                                              ICD-10-CM    1. CLL (chronic lymphocytic leukemia) (HCC) C91.10    2. Chronic systolic congestive heart failure (H) I50.22    3. Anxiety F41.9 LORazepam (ATIVAN) 0.5 MG tablet       Ativan as needed for travel to [place]    Leah Ventura NP  [facility]    "

## 2017-04-04 ENCOUNTER — CARE COORDINATION (OUTPATIENT)
Dept: CARE COORDINATION | Facility: CLINIC | Age: 82
End: 2017-04-04

## 2017-04-04 NOTE — PROGRESS NOTES
Clinic Care Coordination Contact  Care Team Conversations    Reviewed charta nd pt was in to see provider on 4/4.  Pt is anticipated to travel to [facility] the end of the month.  Will follow up in next 1-3 business days.    Farzad Bagley RN/CC  Care Coordinator [facility]  [phone]

## 2017-04-07 ENCOUNTER — CARE COORDINATION (OUTPATIENT)
Dept: CARE COORDINATION | Facility: CLINIC | Age: 82
End: 2017-04-07

## 2017-04-10 ENCOUNTER — TELEPHONE (OUTPATIENT)
Dept: INTERNAL MEDICINE | Facility: CLINIC | Age: 82
End: 2017-04-10

## 2017-04-10 DIAGNOSIS — M10.9 ACUTE GOUTY ARTHRITIS: Primary | ICD-10-CM

## 2017-04-10 RX ORDER — ALLOPURINOL 100 MG/1
100 TABLET ORAL DAILY
Qty: 30 TABLET | Refills: 1 | Status: SHIPPED | OUTPATIENT
Start: 2017-04-10 | End: 2017-06-12

## 2017-04-10 NOTE — TELEPHONE ENCOUNTER
Pt has pain and redness in her R great toe joint.  She stopped her allopurinol on Saturday and is asking if this is a side effect and what she should do?  If she needs to restart Allopurinol, she uses [facility] in [place].    It has gotten worse since it first started midday on Sunday.

## 2017-04-11 NOTE — TELEPHONE ENCOUNTER
Pt was seen 4/3/17 by Leah.   Sent [employer] to pt to see if she is still having problems with her tongue (she states this in the subject of her ExecNote message).

## 2017-04-14 ENCOUNTER — CARE COORDINATION (OUTPATIENT)
Dept: CARE COORDINATION | Facility: CLINIC | Age: 82
End: 2017-04-14

## 2017-04-14 NOTE — PROGRESS NOTES
Clinic Care Coordination Contact  Care Team Conversations    Reviewed chart and reached out to pt and she reported that she has been doing very well.  She is planning to travel to [facility] to see family the end of the month.  She continues with her infusions and follow up closely with oncology.  Discussed care coordination and she stated at this time was not in need but would reach out to CC if needs presented.  Pt has contact information.  Pt is not active in care coordination at this time.    Farzad Bagley RN/CC  Care Coordinator [facility]  [phone]

## 2017-04-24 ENCOUNTER — CARE COORDINATION (OUTPATIENT)
Dept: CARE COORDINATION | Facility: CLINIC | Age: 82
End: 2017-04-24

## 2017-04-24 ENCOUNTER — TELEPHONE (OUTPATIENT)
Dept: INTERNAL MEDICINE | Facility: CLINIC | Age: 82
End: 2017-04-24

## 2017-04-24 NOTE — TELEPHONE ENCOUNTER
Pt says she's supposed to call [employer] everyday to answer health questions at [phone].  She thinks this may have been ordered when she was discharged from the hospital, but her dgtr is taking her to [place] to see her son for their birthdays, so she wants the service to be discontinued.

## 2017-04-24 NOTE — PROGRESS NOTES
Clinic Care Coordination Contact  [facility] Care Coordination Outreach    Patient was enrolled in [facility] upon Discharged from: Hospital    Program Type: COPD    Program Length: 30 days post discharge    Clinical Data:  Outreach Type: Disenrollment call    Action: Actions: Other  (Disenrolled per patient request)     Patient is going out of town with family for several weeks and is requesting to discontinue [employer] calls. Patient is stable.     Plan: RN CC disenrolled patient.     Rita Cruz RN, CCM - Care Coordinator     4/24/2017    11:59 AM  [phone]

## 2017-04-28 NOTE — TELEPHONE ENCOUNTER
I can try calling to see if they will take cancellation from me.  Tried calling and using the number pt was using to call in, it isn't possible to even get into the system without her ID#.  Tried calling pt, but she didn't answer and has probably already left on vacation.  Nothing else we can do at this time.

## 2017-05-09 ENCOUNTER — OFFICE VISIT (OUTPATIENT)
Dept: UROLOGY | Facility: CLINIC | Age: 82
End: 2017-05-09
Payer: MEDICARE

## 2017-05-09 VITALS — HEART RATE: 88 BPM | HEIGHT: 59 IN | BODY MASS INDEX: 21.37 KG/M2 | WEIGHT: 106 LBS | OXYGEN SATURATION: 90 %

## 2017-05-09 DIAGNOSIS — C67.4 MALIGNANT NEOPLASM OF POSTERIOR WALL OF URINARY BLADDER (H): Primary | ICD-10-CM

## 2017-05-09 LAB
ALBUMIN UR-MCNC: NEGATIVE MG/DL
APPEARANCE UR: CLEAR
BILIRUB UR QL STRIP: NEGATIVE
COLOR UR AUTO: YELLOW
GLUCOSE UR STRIP-MCNC: NEGATIVE MG/DL
HGB UR QL STRIP: NEGATIVE
KETONES UR STRIP-MCNC: NEGATIVE MG/DL
LEUKOCYTE ESTERASE UR QL STRIP: NEGATIVE
NITRATE UR QL: NEGATIVE
PH UR STRIP: 6.5 PH (ref 5–7)
SP GR UR STRIP: 1.01 (ref 1–1.03)
URN SPEC COLLECT METH UR: NORMAL
UROBILINOGEN UR STRIP-ACNC: 0.2 EU/DL (ref 0.2–1)

## 2017-05-09 PROCEDURE — 52281 CYSTOSCOPY AND TREATMENT: CPT | Performed by: UROLOGY

## 2017-05-09 PROCEDURE — 88112 CYTOPATH CELL ENHANCE TECH: CPT | Performed by: UROLOGY

## 2017-05-09 PROCEDURE — 81003 URINALYSIS AUTO W/O SCOPE: CPT | Performed by: UROLOGY

## 2017-05-09 PROCEDURE — 99211 OFF/OP EST MAY X REQ PHY/QHP: CPT | Mod: 25 | Performed by: UROLOGY

## 2017-05-09 PROCEDURE — 88112 CYTOPATH CELL ENHANCE TECH: CPT | Mod: 26 | Performed by: UROLOGY

## 2017-05-09 RX ORDER — CIPROFLOXACIN 250 MG/1
250 TABLET, FILM COATED ORAL ONCE
Qty: 1 TABLET | Refills: 0 | Status: SHIPPED | OUTPATIENT
Start: 2017-05-09 | End: 2017-05-09

## 2017-05-09 ASSESSMENT — PAIN SCALES - GENERAL: PAINLEVEL: NO PAIN (0)

## 2017-05-09 NOTE — NURSING NOTE
Prior to the start of the procedure and with procedural staff participation, I verbally confirmed the patient s identity using two indicators, relevant allergies, that the procedure was appropriate and matched the consent or emergent situation, and that the correct equipment/implants were available. Immediately prior to starting the procedure I conducted the Time Out with the procedural staff and re-confirmed the patient s name, procedure, and site/side. (The Joint Commission universal protocol was followed.)  Yes    Sedation (Moderate or Deep): None  MOISE Best CMA  Pt has signed the consent form stating that we will be doing a CYSTOSCOPY (with or without stent removal) today, and that it is the correct procedure. I verbally confirmed the patient s identity using two indicators, relevant allergies, and that the correct equipment was available. Post-op information given to the pt as needed at check-out. I have sent an appropriate antibiotic to the pharmacy in our building as recommended by the MD. MOISE Best CMA

## 2017-05-09 NOTE — MR AVS SNAPSHOT
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 We suggest that you continue to avoid caffeine, fruit juice, and alcohol for the next 24 hours, however, you are encouraged to return to your normal activities.       A few things that are considered normal after your cystoscopy:    * small amount of bleeding (or spotting) that clears within the next 24 hours    * slight burning sensation with urination    * sensation to of needing to avoid more frequently    * the feeling of \"air\" in your urine    * mild discomfort that is relieved with Tylonol        Please contact our office promptly if you:    * develop a fever above 101 degrees    * are unable to urinate    * develop bright red blood that does not stop    * severe pain or swelling        And of course, please contact our office with any concerns or questions [phone]              Follow-ups after your visit        Follow-up notes from your care team     Return for [facility].      Your next 10 appointments already scheduled     Sep 07, 2017 10:20 AM CDT   Cystoscopy with Kar Nuñez MD, [facility] [place] ([facility] [place])    [facility] [address]  [address]  [facility] [address] [phone]              Who to contact     If you have questions or need follow up information about today's clinic visit or " "your schedule please contact [facility] [place] directly at [phone].  Normal or non-critical lab and imaging results will be communicated to you by [employer], letter or phone within 4 business days after the clinic has received the results. If you do not hear from us within 7 days, please contact the clinic through [employer] or phone. If you have a critical or abnormal lab result, we will notify you by phone as soon as possible.  Submit refill requests through [employer] or call your pharmacy and they will forward the refill request to us. Please allow 3 business days for your refill to be completed.          Additional Information About Your Visit        [employer] Information     [employer] gives you secure access to your electronic health record. If you see a primary care provider, you can also send messages to your care team and make appointments. If you have questions, please call your primary care clinic.  If you do not have a primary care provider, please call [phone] and they will assist you.        Care EveryWhere ID     This is your Care EveryWhere ID. This could be used by other organizations to access your [place] medical records  [id number]        Your Vitals Were     Pulse Height Pulse Oximetry BMI (Body Mass Index)          88 1.499 m (4' 11\") 90% 21.41 kg/m2         Blood Pressure from Last 3 Encounters:   04/03/17 100/60   03/27/17 136/56   12/29/16 120/68    Weight from Last 3 Encounters:   05/09/17 48.1 kg (106 lb)   04/03/17 46.5 kg (102 lb 8 oz)   03/27/17 53 kg (116 lb 14.4 oz)              We Performed the Following     CYSTOSCOPY W DIL URETHRAL STRICTURE/CALIBRATION (54726)     Cytology non gyn [[id number]]     UA without Microscopic          Today's Medication Changes          These changes are accurate as of: 5/9/17 11:13 AM.  If you have any questions, ask your nurse or doctor.               Start taking these medicines.        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)   Started by:  Kar Nuñez MD        Dose:  250 mg   Take 1 tablet (250 mg) by mouth once for 1 dose   Quantity:  1 tablet   Refills:  0            Where to get your medicines      These medications were sent to [place] [id number] - [place], [place] - [address]  [address], [facility] [address]     Phone:  [phone]     ciprofloxacin 250 MG tablet                Primary Care Provider Office Phone # Fax #    Piper Catracho Kiser [phone]       [facility] [address] [facility] [address]        Thank you!     Thank you for choosing [facility] [place]  for your care. Our goal is always to provide you with excellent care. Hearing back from our patients is one way we can continue to improve our services. Please take a few minutes to complete the written survey that you may receive in the mail after your visit with us. Thank you!             Your Updated Medication List - Protect others around you: Learn how to safely use, store and throw away your medicines at www.disposemymeds.org.          This list is accurate as of: 5/9/17 11:13 AM.  Always use your most recent med list.                   Brand Name Dispense Instructions for use    * albuterol (2.5 MG/3ML) 0.083% neb solution     360 mL    Take 1 vial (2.5 mg) by nebulization every 6 hours as needed for shortness of breath / dyspnea or wheezing       * albuterol 108 (90 BASE) MCG/ACT Inhaler    PROAIR HFA/PROVENTIL HFA/VENTOLIN HFA    1 Inhaler    Inhale 2 puffs into the lungs every 6 hours as needed for wheezing       allopurinol 100 MG tablet    ZYLOPRIM    30 tablet    Take 1 tablet (100 mg) by mouth daily       ascorbic acid 500 MG Tabs      Take 500 mg by mouth daily       atorvastatin 20 MG tablet    LIPITOR    90 tablet    Take 1 tablet  (20 mg) by mouth daily       BENADRYL 25 MG capsule   Generic drug:  diphenhydrAMINE      Take 25 mg by mouth At Bedtime       calcium carbonate 500 MG tablet    OS-CARLOS 500 mg [place]. Ca     Take 500 mg by mouth daily       ciprofloxacin 250 MG tablet    CIPRO    1 tablet    Take 1 tablet (250 mg) by mouth once for 1 dose       FERROUS GLUCONATE PO      Take 324 mg by mouth daily (with breakfast)       fluticasone-salmeterol 250-50 MCG/DOSE diskus inhaler    ADVAIR    1 Inhaler    Inhale 1 puff into the lungs every 12 hours       furosemide 20 MG tablet    LASIX    30 tablet    Take 1 tablet (20 mg) by mouth daily       levothyroxine 75 MCG tablet    SYNTHROID/LEVOTHROID    90 tablet    Take 1 tablet (75 mcg) by mouth daily       LORazepam 0.5 MG tablet    ATIVAN    20 tablet    Take 1 tablet (0.5 mg) by mouth every 8 hours as needed for anxiety       metoprolol 25 MG 24 hr tablet    TOPROL-XL    90 tablet    Take 1 tablet (25 mg) by mouth daily       MULTIVITAMIN GUMMIES ADULT PO      Take 1 tablet by mouth daily       nystatin 355820 UNIT/ML suspension    MYCOSTATIN    300 mL    Take 5 mLs (500,000 Units) by mouth 4 times daily       predniSONE 10 MG tablet    DELTASONE    18 tablet    By mouth, take 30 mg (3 tabs) daily for 3 days, then 20 mg (2 tabs) daily for 3 days, then 10 mg (1 tab) daily for 3 days, then stop       traZODone 50 MG tablet    DESYREL    30 tablet    Take 1 tablet (50 mg) by mouth nightly as needed for sleep       vitamin D 2000 UNITS tablet     100 tablet    Take 2,000 Units by mouth daily       * Notice:  This list has 2 medication(s) that are the same as other medications prescribed for you. Read the directions carefully, and ask your doctor or other care provider to review them with you.

## 2017-05-09 NOTE — PATIENT INSTRUCTIONS
"     AFTER YOUR CYSTOSCOPY         You have just completed a cystoscopy, or \"cysto\", which allowed your physician to learn more about your bladder (or to remove a stent placed after surgery). We suggest that you continue to avoid caffeine, fruit juice, and alcohol for the next 24 hours, however, you are encouraged to return to your normal activities.       A few things that are considered normal after your cystoscopy:    * small amount of bleeding (or spotting) that clears within the next 24 hours    * slight burning sensation with urination    * sensation to of needing to avoid more frequently    * the feeling of \"air\" in your urine    * mild discomfort that is relieved with Tylonol        Please contact our office promptly if you:    * develop a fever above 101 degrees    * are unable to urinate    * develop bright red blood that does not stop    * severe pain or swelling        And of course, please contact our office with any concerns or questions [phone]        "

## 2017-05-09 NOTE — PROGRESS NOTES
Marie is an 85-year-old female with urethral stenosis and a history of transitional cell carcinoma of the bladder. Her last bladder tumor was in February 2016. She has noticed a slower stream; her dilation 4 months ago did help.  Other past medical history: CLL, COPD, cardiomyopathy, congestive heart failure, hypothyroid, tricuspid valve disorder, former smoker  Medications: Albuterol, allopurinol, vitamin C, Lipitor, Os-Sanjay, vitamin D, Benadryl, iron gluconate, Lasix, Synthroid, Ativan, metoprolol, multivitamin, Mycostatin, prednisone, trazodone  Allergies: IV contrast  Urinalysis: Normal-sent for cytology  Flexible cystoscopy-no bladder tumor or raised erythema, normal urethral mucosa. Urethral meatus dilated with 24 Portuguese straight sound  Assessment: History of transitional cell carcinoma the bladder-no recurrence                         Urethral stenosis  Plan: Cipro 250 mg ×1 today. Follow-up in 4 months

## 2017-05-09 NOTE — LETTER
5/9/2017       RE: Marie Kline  [address]  [place] [address]     Dear Colleague,    Thank you for referring your patient, Marie Kline, to the [facility] [place] at [facility]. Please see a copy of my visit note below.    Marie is an 85-year-old female with urethral stenosis and a history of transitional cell carcinoma of the bladder. Her last bladder tumor was in February 2016. She has noticed a slower stream; her dilation 4 months ago did help.  Other past medical history: CLL, COPD, cardiomyopathy, congestive heart failure, hypothyroid, tricuspid valve disorder, former smoker  Medications: Albuterol, allopurinol, vitamin C, Lipitor, Os-Sanjay, vitamin D, Benadryl, iron gluconate, Lasix, Synthroid, Ativan, metoprolol, multivitamin, Mycostatin, prednisone, trazodone  Allergies: IV contrast  Urinalysis: Normal-sent for cytology  Flexible cystoscopy-no bladder tumor or raised erythema, normal urethral mucosa. Urethral meatus dilated with 24 [place] straight sound  Assessment: History of transitional cell carcinoma the bladder-no recurrence                         Urethral stenosis  Plan: Cipro 250 mg ×1 today. Follow-up in 4 months    Again, thank you for allowing me to participate in the care of your patient.      Sincerely,    Kar Nuñez MD

## 2017-05-10 LAB — COPATH REPORT: NORMAL

## 2017-05-12 ENCOUNTER — HOSPITAL ENCOUNTER (OUTPATIENT)
Facility: CLINIC | Age: 82
Setting detail: SPECIMEN
Discharge: HOME | End: 2017-05-12
Attending: INTERNAL MEDICINE | Admitting: INTERNAL MEDICINE
Payer: MEDICARE

## 2017-05-12 ENCOUNTER — INFUSION THERAPY VISIT (OUTPATIENT)
Dept: INFUSION THERAPY | Facility: CLINIC | Age: 82
End: 2017-05-12
Attending: INTERNAL MEDICINE
Payer: MEDICARE

## 2017-05-12 VITALS — HEART RATE: 80 BPM | RESPIRATION RATE: 16 BRPM | OXYGEN SATURATION: 91 % | TEMPERATURE: 97.2 F

## 2017-05-12 DIAGNOSIS — N18.30 CKD (CHRONIC KIDNEY DISEASE) STAGE 3, GFR 30-59 ML/MIN (H): ICD-10-CM

## 2017-05-12 DIAGNOSIS — M81.0 SENILE OSTEOPOROSIS: Primary | ICD-10-CM

## 2017-05-12 LAB
CALCIUM SERPL-MCNC: 9.3 MG/DL (ref 8.5–10.1)
CREAT SERPL-MCNC: 1.05 MG/DL (ref 0.52–1.04)
GFR SERPL CREATININE-BSD FRML MDRD: 50 ML/MIN/1.7M2
PHOSPHATE SERPL-MCNC: 3.3 MG/DL (ref 2.5–4.5)

## 2017-05-12 PROCEDURE — 82565 ASSAY OF CREATININE: CPT | Performed by: INTERNAL MEDICINE

## 2017-05-12 PROCEDURE — 84100 ASSAY OF PHOSPHORUS: CPT | Performed by: INTERNAL MEDICINE

## 2017-05-12 PROCEDURE — 25000128 H RX IP 250 OP 636: Performed by: INTERNAL MEDICINE

## 2017-05-12 PROCEDURE — 96372 THER/PROPH/DIAG INJ SC/IM: CPT

## 2017-05-12 PROCEDURE — 82310 ASSAY OF CALCIUM: CPT | Performed by: INTERNAL MEDICINE

## 2017-05-12 PROCEDURE — 36415 COLL VENOUS BLD VENIPUNCTURE: CPT

## 2017-05-12 RX ADMIN — DENOSUMAB 60 MG: 60 INJECTION SUBCUTANEOUS at 13:40

## 2017-05-12 NOTE — MR AVS SNAPSHOT
After Visit Summary   5/12/2017    Marie Kline    MRN: [id number]           Patient Information     Date Of Birth          4/28/1932        Visit Information        Provider Department      5/12/2017 12:00 PM [facility] INFUSION CHAIR 3 [facility]        Today's Diagnoses     Senile osteoporosis    -  1    CKD (chronic kidney disease) stage 3, GFR 30-59 ml/min           Follow-ups after your visit        Your next 10 appointments already scheduled     Sep 07, 2017 10:20 AM CDT   Cystoscopy with Kar Nuñez MD, UB [facility]   [facility] [place] ([facility] [place])    [address]  [address]  [facility] [address] [phone]              Who to contact     If you have questions or need follow up information about today's clinic visit or your schedule please contact [facility] directly at [phone].  Normal or non-critical lab and imaging results will be communicated to you by MyChart, letter or phone within 4 business days after the clinic has received the results. If you do not hear from us within 7 days, please contact the clinic through [employer] or phone. If you have a critical or abnormal lab result, we will notify you by phone as soon as possible.  Submit refill requests through [employer] or call your pharmacy and they will forward the refill request to us. Please allow 3 business days for your refill to be completed.          Additional Information About Your Visit        MyChart Information     [employer] gives you secure access to your electronic health record. If you see a primary care provider, you can also send messages to your care team and make appointments. If you have questions, please call your primary care clinic.  If you do not have a primary care provider, please call [phone] and they will assist you.        Care EveryWhere ID     This is your Care  EveryWhere ID. This could be used by other organizations to access your [place] medical records  [id number]        Your Vitals Were     Pulse Temperature Respirations Pulse Oximetry          80 97.2  F (36.2  C) (Tympanic) 16 91%         Blood Pressure from Last 3 Encounters:   04/03/17 100/60   03/27/17 136/56   12/29/16 120/68    Weight from Last 3 Encounters:   05/09/17 48.1 kg (106 lb)   04/03/17 46.5 kg (102 lb 8 oz)   03/27/17 53 kg (116 lb 14.4 oz)              We Performed the Following     Calcium     Creatinine     Phosphorus        Primary Care Provider Office Phone # Fax #    Piper Kiser [phone]       [facility] [address] [address]        Thank you!     Thank you for choosing [facility]  for your care. Our goal is always to provide you with excellent care. Hearing back from our patients is one way we can continue to improve our services. Please take a few minutes to complete the written survey that you may receive in the mail after your visit with us. Thank you!             Your Updated Medication List - Protect others around you: Learn how to safely use, store and throw away your medicines at www.disposemymeds.org.          This list is accurate as of: 5/12/17  1:50 PM.  Always use your most recent med list.                   Brand Name Dispense Instructions for use    * albuterol (2.5 MG/3ML) 0.083% neb solution     360 mL    Take 1 vial (2.5 mg) by nebulization every 6 hours as needed for shortness of breath / dyspnea or wheezing       * albuterol 108 (90 BASE) MCG/ACT Inhaler    PROAIR HFA/PROVENTIL HFA/VENTOLIN HFA    1 Inhaler    Inhale 2 puffs into the lungs every 6 hours as needed for wheezing       allopurinol 100 MG tablet    ZYLOPRIM    30 tablet    Take 1 tablet (100 mg) by mouth daily       ascorbic acid 500 MG Tabs      Take 500 mg by mouth daily       atorvastatin 20 MG tablet    LIPITOR     90 tablet    Take 1 tablet (20 mg) by mouth daily       BENADRYL 25 MG capsule   Generic drug:  diphenhydrAMINE      Take 25 mg by mouth At Bedtime       calcium carbonate 500 MG tablet    OS-CARLOS 500 mg [place]. Ca     Take 500 mg by mouth daily       FERROUS GLUCONATE PO      Take 324 mg by mouth daily (with breakfast)       fluticasone-salmeterol 250-50 MCG/DOSE diskus inhaler    ADVAIR    1 Inhaler    Inhale 1 puff into the lungs every 12 hours       furosemide 20 MG tablet    LASIX    30 tablet    Take 1 tablet (20 mg) by mouth daily       levothyroxine 75 MCG tablet    SYNTHROID/LEVOTHROID    90 tablet    Take 1 tablet (75 mcg) by mouth daily       LORazepam 0.5 MG tablet    ATIVAN    20 tablet    Take 1 tablet (0.5 mg) by mouth every 8 hours as needed for anxiety       metoprolol 25 MG 24 hr tablet    TOPROL-XL    90 tablet    Take 1 tablet (25 mg) by mouth daily       MULTIVITAMIN GUMMIES ADULT PO      Take 1 tablet by mouth daily       nystatin 865330 UNIT/ML suspension    MYCOSTATIN    300 mL    Take 5 mLs (500,000 Units) by mouth 4 times daily       predniSONE 10 MG tablet    DELTASONE    18 tablet    By mouth, take 30 mg (3 tabs) daily for 3 days, then 20 mg (2 tabs) daily for 3 days, then 10 mg (1 tab) daily for 3 days, then stop       traZODone 50 MG tablet    DESYREL    30 tablet    Take 1 tablet (50 mg) by mouth nightly as needed for sleep       vitamin D 2000 UNITS tablet     100 tablet    Take 2,000 Units by mouth daily       * Notice:  This list has 2 medication(s) that are the same as other medications prescribed for you. Read the directions carefully, and ask your doctor or other care provider to review them with you.

## 2017-05-12 NOTE — PROGRESS NOTES
Infusion Nursing Note:  Marie Kline presents today for prolia.    Patient seen by provider today: No   present during visit today: Not Applicable.    Note: N/A.    Intravenous Access:  Lab draw site right ac, Needle type butterfly, Gauge 23.  Peripheral IV placed.    Treatment Conditions:  Lab Results   Component Value Date     03/27/2017                   Lab Results   Component Value Date    POTASSIUM 4.4 03/27/2017           Lab Results   Component Value Date    MAG 1.7 03/24/2017            Lab Results   Component Value Date    CR 1.05 05/12/2017                   Lab Results   Component Value Date    CARLOS 9.3 05/12/2017                Lab Results   Component Value Date    BILITOTAL 0.3 03/27/2017           Lab Results   Component Value Date    ALBUMIN 2.4 03/27/2017                    Lab Results   Component Value Date    ALT 27 03/27/2017           Lab Results   Component Value Date    AST 23 03/27/2017     Results reviewed, labs MET treatment parameters, ok to proceed with treatment.      Post Infusion Assessment:  Patient tolerated injection without incident.    Discharge Plan:   Patient declined prescription refills.  Discharge instructions reviewed with: Patient.  Patient and/or family verbalized understanding of discharge instructions and all questions answered.  Patient discharged in stable condition accompanied by: self.  Departure Mode: Ambulatory.    Korina Joseph RN

## 2017-05-13 DIAGNOSIS — E78.5 HYPERLIPIDEMIA WITH TARGET LDL LESS THAN 130: ICD-10-CM

## 2017-05-13 DIAGNOSIS — I24.9 ACS (ACUTE CORONARY SYNDROME) (H): ICD-10-CM

## 2017-05-13 NOTE — TELEPHONE ENCOUNTER
Atorvastatin     Last Written Prescription Date: 2/15/17  Last Fill Quantity: 90, # refills: 0  Last Office Visit with [facility], [facility] or [facility] prescribing provider: 4/3/17 Gianluca      Last FLP 2014    Lab Results   Component Value Date    CHOL 167 10/09/2014     Lab Results   Component Value Date    HDL 55 10/09/2014     Lab Results   Component Value Date    LDL 88 10/09/2014     Lab Results   Component Value Date    TRIG 121 10/09/2014     Lab Results   Component Value Date    CHOLHDLRATIO 3.0 10/09/2014       Labs showing if normal/abnormal  Lab Results   Component Value Date    CHOL 167 10/09/2014    TRIG 121 10/09/2014    HDL 55 10/09/2014    LDL 88 10/09/2014    VLDL 24 10/09/2014    CHOLHDLRATIO 3.0 10/09/2014

## 2017-05-15 RX ORDER — ATORVASTATIN CALCIUM 20 MG/1
TABLET, FILM COATED ORAL
Qty: 90 TABLET | Refills: 0 | Status: SHIPPED | OUTPATIENT
Start: 2017-05-15 | End: 2017-08-09

## 2017-05-17 DIAGNOSIS — J45.20 INTERMITTENT ASTHMA WITHOUT COMPLICATION: ICD-10-CM

## 2017-05-18 DIAGNOSIS — J45.20 INTERMITTENT ASTHMA WITHOUT COMPLICATION: ICD-10-CM

## 2017-05-18 RX ORDER — ALBUTEROL SULFATE 90 UG/1
AEROSOL, METERED RESPIRATORY (INHALATION)
Qty: 1 INHALER | Refills: 3 | Status: SHIPPED | OUTPATIENT
Start: 2017-05-18 | End: 2017-05-26

## 2017-05-18 RX ORDER — ALBUTEROL SULFATE 90 UG/1
AEROSOL, METERED RESPIRATORY (INHALATION)
Qty: 18 INHALER | Refills: 3 | OUTPATIENT
Start: 2017-05-18

## 2017-05-18 NOTE — TELEPHONE ENCOUNTER
Ventolin inhalers     Duplicate  Last Written Prescription Date: 02/10/17  Last Fill Quantity: 1, # refills: 10    Last Office Visit with G, P or [facility] prescribing provider:  04/03/17   Future Office Visit:       Date of Last Asthma Action Plan Letter:   There are no preventive care reminders to display for this patient.   Asthma Control Test: No flowsheet data found.    Date of Last Spirometry Test:   No results found for this or any previous visit.

## 2017-05-22 ENCOUNTER — TELEPHONE (OUTPATIENT)
Dept: INTERNAL MEDICINE | Facility: CLINIC | Age: 82
End: 2017-05-22

## 2017-05-22 NOTE — TELEPHONE ENCOUNTER
Patient stating she just doesn't have a lot of energy anymore, pt states has had a lot of blood work done, pt has Leukemia and COPD. Requesting appointment with PCP, scheduled 10:40 am 5/26/16 with Dr. Kiser.

## 2017-05-26 ENCOUNTER — OFFICE VISIT (OUTPATIENT)
Dept: INTERNAL MEDICINE | Facility: CLINIC | Age: 82
End: 2017-05-26
Payer: MEDICARE

## 2017-05-26 VITALS
HEART RATE: 99 BPM | SYSTOLIC BLOOD PRESSURE: 104 MMHG | TEMPERATURE: 97.3 F | WEIGHT: 106.2 LBS | DIASTOLIC BLOOD PRESSURE: 70 MMHG | BODY MASS INDEX: 21.41 KG/M2 | HEIGHT: 59 IN

## 2017-05-26 DIAGNOSIS — H91.90 HEARING LOSS, UNSPECIFIED LATERALITY: ICD-10-CM

## 2017-05-26 DIAGNOSIS — J44.1 COPD EXACERBATION (H): Primary | ICD-10-CM

## 2017-05-26 DIAGNOSIS — F41.1 GAD (GENERALIZED ANXIETY DISORDER): ICD-10-CM

## 2017-05-26 DIAGNOSIS — E03.8 OTHER SPECIFIED HYPOTHYROIDISM: ICD-10-CM

## 2017-05-26 DIAGNOSIS — C91.10 CLL (CHRONIC LYMPHOCYTIC LEUKEMIA) (H): ICD-10-CM

## 2017-05-26 DIAGNOSIS — C67.9 MALIGNANT NEOPLASM OF URINARY BLADDER, UNSPECIFIED SITE (H): ICD-10-CM

## 2017-05-26 PROCEDURE — 99214 OFFICE O/P EST MOD 30 MIN: CPT | Performed by: INTERNAL MEDICINE

## 2017-05-26 NOTE — PATIENT INSTRUCTIONS
Increase trazodone to 100mg (2 pills) at night.     Consider allegra or zyrtec or claritin around allergy season, which may prevent the COPD flare.       Check with Dr. Ivory regarding shingles

## 2017-05-26 NOTE — PROGRESS NOTES
"  SUBJECTIVE:                                                    Maire Kline is a 85 year old female who presents to clinic today for the following health issues:      Hyperlipidemia Follow-Up      Rate your low fat/cholesterol diet?: good    Taking statin?  No    Other lipid medications/supplements?:  none       Hypothyroidism.  No hair loss.  No heat or cold intolerance.  No fatigue.    COPD.  Her breathing has been worsened pollen.  She has a nebulizer at home.  She does not see a pulmonologist.      H/o CLL.  She has been following with Dr. Ivory.     Bladder cancer.  She is following with Dr. Nuñez.  She has f/u in 6 months.     LESLY.  Her anxiety is stable.    She is getting her puppy today- Diddo. She plans on walking her dog as able.     Insomnia.  Trazodone 50mg.  No caffeine after noon.  Exercises.         Amount of exercise or physical activity: None    Problems taking medications regularly: No    Medication side effects: none    Diet: regular (no restrictions)          Problem list and histories reviewed & adjusted, as indicated.  Additional history: as documented    Labs reviewed in EPIC    Reviewed and updated as needed this visit by clinical staff  Tobacco  Allergies  Med Hx  Surg Hx  Fam Hx  Soc Hx      Reviewed and updated as needed this visit by Provider         ROS:  C: NEGATIVE for fever, chills, change in weight  E/M: POS hearing loss  R: NEGATIVE for significant cough or SOB  CV: NEGATIVE for chest pain, palpitations or peripheral edema    OBJECTIVE:                                                    /70 (BP Location: Left arm, Patient Position: Chair, Cuff Size: Adult Regular)  Pulse 99  Temp 97.3  F (36.3  C) (Oral)  Ht 4' 11\" (1.499 m)  Wt 106 lb 3.2 oz (48.2 kg)  BMI 21.45 kg/m2  Body mass index is 21.45 kg/(m^2).  GENERAL: healthy, alert and no distress  ENT: no wax appreciated bilaterally  NECK: no adenopathy, no asymmetry, masses, or scars and thyroid normal to " palpation  RESP: lungs clear to auscultation - no rales, rhonchi or wheezes  CV: regular rate and rhythm, normal S1 S2, no S3 or S4, no murmur, click or rub     ASSESSMENT/PLAN:                                                        (J44.1) COPD exacerbation (H)  (primary encounter diagnosis)  Comment:   Plan: advair and albuterol inhaler    (F41.1) LESLY (generalized anxiety disorder)  Comment: stable  Plan:     (E03.8) Other specified hypothyroidism  Comment:  Plan: levothyroxine    (H91.90) Hearing loss, unspecified laterality  Comment:  Plan: OTOLARYNGOLOGY REFERRAL          H/o CLL and bladder cancer.  Followed by oncology.      Piper Kiser MD  [facility]      >25 minutes spent with patient, and >50% of time spent counseling

## 2017-05-26 NOTE — MR AVS SNAPSHOT
After Visit Summary   5/26/2017    Marie Kline    MRN: [id number]           Patient Information     Date Of Birth          4/28/1932        Visit Information        Provider Department      5/26/2017 10:40 AM Piper Kiser MD [facility]        Today's Diagnoses     COPD exacerbation (H)    -  1    LESLY (generalized anxiety disorder)        Other specified hypothyroidism        Hearing loss, unspecified laterality          Care Instructions    Increase trazodone to 100mg (2 pills) at night.     Consider allegra or zyrtec or claritin around allergy season, which may prevent the COPD flare.       Check with Dr. Ivory regarding shingles          Follow-ups after your visit        Additional Services     OTOLARYNGOLOGY REFERRAL       Your provider has referred you to: N: [facility] [phone]   [email or web address]    Please be aware that coverage of these services is subject to the terms and limitations of your health insurance plan.  Call member services at your health plan with any benefit or coverage questions.      Please bring the following with you to your appointment:    (1) Any X-Rays, CTs or MRIs which have been performed.  Contact the facility where they were done to arrange for  prior to your scheduled appointment.   (2) List of current medications  (3) This referral request   (4) Any documents/labs given to you for this referral                  Your next 10 appointments already scheduled     Sep 07, 2017 10:20 AM CDT   Cystoscopy with Kar Nuñez MD, UB CYF   [facility] [place] ([facility] [place])    [address]  [address]  [facility] [address] [phone]              Who to contact     If you have questions or need follow up information about today's clinic visit or your schedule please contact [place]  [facility] directly at [phone].  Normal or non-critical lab and imaging results will be communicated to you by MyChart, letter or phone within 4 business days after the clinic has received the results. If you do not hear from us within 7 days, please contact the clinic through [employer] or phone. If you have a critical or abnormal lab result, we will notify you by phone as soon as possible.  Submit refill requests through [employer] or call your pharmacy and they will forward the refill request to us. Please allow 3 business days for your refill to be completed.          Additional Information About Your Visit        [employer] Information     [employer] gives you secure access to your electronic health record. If you see a primary care provider, you can also send messages to your care team and make appointments. If you have questions, please call your primary care clinic.  If you do not have a primary care provider, please call [phone] and they will assist you.        Care EveryWhere ID     This is your Care EveryWhere ID. This could be used by other organizations to access your [place] medical records  [id number]        Your Vitals Were     Pulse Temperature Height BMI (Body Mass Index)          99 97.3  F (36.3  C) (Oral) 4' 11\" (1.499 m) 21.45 kg/m2         Blood Pressure from Last 3 Encounters:   05/26/17 104/70   04/03/17 100/60   03/27/17 136/56    Weight from Last 3 Encounters:   05/26/17 106 lb 3.2 oz (48.2 kg)   05/09/17 106 lb (48.1 kg)   04/03/17 102 lb 8 oz (46.5 kg)              We Performed the Following     OTOLARYNGOLOGY REFERRAL          Today's Medication Changes          These changes are accurate as of: 5/26/17 11:42 AM.  If you have any questions, ask your nurse or doctor.               These medicines have changed or have updated prescriptions.        Dose/Directions    * albuterol (2.5 MG/3ML) 0.083% neb solution   This may have changed:  Another medication with the same name was " removed. Continue taking this medication, and follow the directions you see here.   Used for:  COPD exacerbation (H)   Changed by:  Piper Kiser MD        Dose:  1 vial   Take 1 vial (2.5 mg) by nebulization every 6 hours as needed for shortness of breath / dyspnea or wheezing   Quantity:  360 mL   Refills:  1       * albuterol 108 (90 BASE) MCG/ACT Inhaler   Commonly known as:  PROAIR HFA/PROVENTIL HFA/VENTOLIN HFA   This may have changed:  Another medication with the same name was removed. Continue taking this medication, and follow the directions you see here.   Used for:  Intermittent asthma without complication   Changed by:  Piper Kiser MD        Dose:  2 puff   Inhale 2 puffs into the lungs every 6 hours as needed for wheezing   Quantity:  1 Inhaler   Refills:  10       * Notice:  This list has 2 medication(s) that are the same as other medications prescribed for you. Read the directions carefully, and ask your doctor or other care provider to review them with you.      Stop taking these medicines if you haven't already. Please contact your care team if you have questions.     predniSONE 10 MG tablet   Commonly known as:  DELTASONE   Stopped by:  Piper Kiser MD                    Primary Care Provider Office Phone # Fax #    Piper Kiser [phone]       [place] [address] [place] [address]        Thank you!     Thank you for choosing [facility]  for your care. Our goal is always to provide you with excellent care. Hearing back from our patients is one way we can continue to improve our services. Please take a few minutes to complete the written survey that you may receive in the mail after your visit with us. Thank you!             Your Updated Medication List - Protect others around you: Learn how to safely use, store and throw away your medicines at www.disposemymeds.org.          This list is accurate as of:  5/26/17 11:42 AM.  Always use your most recent med list.                   Brand Name Dispense Instructions for use    * albuterol (2.5 MG/3ML) 0.083% neb solution     360 mL    Take 1 vial (2.5 mg) by nebulization every 6 hours as needed for shortness of breath / dyspnea or wheezing       * albuterol 108 (90 BASE) MCG/ACT Inhaler    PROAIR HFA/PROVENTIL HFA/VENTOLIN HFA    1 Inhaler    Inhale 2 puffs into the lungs every 6 hours as needed for wheezing       allopurinol 100 MG tablet    ZYLOPRIM    30 tablet    Take 1 tablet (100 mg) by mouth daily       ascorbic acid 500 MG Tabs      Take 500 mg by mouth daily       atorvastatin 20 MG tablet    LIPITOR    90 tablet    TAKE 1 TABLET (20 MG) BY MOUTH DAILY. NEED APPOINTMENT FOR REFILLS       BENADRYL 25 MG capsule   Generic drug:  diphenhydrAMINE      Take 25 mg by mouth At Bedtime       calcium carbonate 500 MG tablet    OS-CARLOS 500 mg [place]     Take 500 mg by mouth daily       FERROUS GLUCONATE PO      Take 324 mg by mouth daily (with breakfast)       fluticasone-salmeterol 250-50 MCG/DOSE diskus inhaler    ADVAIR    1 Inhaler    Inhale 1 puff into the lungs every 12 hours       furosemide 20 MG tablet    LASIX    30 tablet    TAKE 1 TABLET BY MOUTH DAILY       levothyroxine 75 MCG tablet    SYNTHROID/LEVOTHROID    90 tablet    Take 1 tablet (75 mcg) by mouth daily       LORazepam 0.5 MG tablet    ATIVAN    20 tablet    Take 1 tablet (0.5 mg) by mouth every 8 hours as needed for anxiety       metoprolol 25 MG 24 hr tablet    TOPROL-XL    90 tablet    Take 1 tablet (25 mg) by mouth daily       MULTIVITAMIN GUMMIES ADULT PO      Take 1 tablet by mouth daily       nystatin 892830 UNIT/ML suspension    MYCOSTATIN    300 mL    Take 5 mLs (500,000 Units) by mouth 4 times daily       traZODone 50 MG tablet    DESYREL    30 tablet    Take 1 tablet (50 mg) by mouth nightly as needed for sleep       vitamin D 2000 UNITS tablet     100 tablet    Take 2,000 Units by mouth  daily       * Notice:  This list has 2 medication(s) that are the same as other medications prescribed for you. Read the directions carefully, and ask your doctor or other care provider to review them with you.

## 2017-05-26 NOTE — NURSING NOTE
"Chief Complaint   Patient presents with     RECHECK     Discuss Prolia injection.       Initial There were no vitals taken for this visit. Estimated body mass index is 21.41 kg/(m^2) as calculated from the following:    Height as of 5/9/17: 4' 11\" (1.499 m).    Weight as of 5/9/17: 106 lb (48.1 kg).  Medication Reconciliation: complete     Marcia Lora CMA      "

## 2017-05-31 ENCOUNTER — TELEPHONE (OUTPATIENT)
Dept: INTERNAL MEDICINE | Facility: CLINIC | Age: 82
End: 2017-05-31

## 2017-05-31 PROBLEM — C67.9 MALIGNANT NEOPLASM OF URINARY BLADDER, UNSPECIFIED SITE (H): Status: ACTIVE | Noted: 2017-05-31

## 2017-06-06 ENCOUNTER — TRANSFERRED RECORDS (OUTPATIENT)
Dept: HEALTH INFORMATION MANAGEMENT | Facility: CLINIC | Age: 82
End: 2017-06-06

## 2017-06-12 DIAGNOSIS — M10.9 ACUTE GOUTY ARTHRITIS: ICD-10-CM

## 2017-06-13 RX ORDER — ALLOPURINOL 100 MG/1
TABLET ORAL
Qty: 30 TABLET | Refills: 1 | Status: SHIPPED | OUTPATIENT
Start: 2017-06-13 | End: 2017-08-21

## 2017-06-13 NOTE — TELEPHONE ENCOUNTER
Allopurinol        Last Written Prescription Date: 4/10/17  Last Fill Quantity: 30, # refills: 1  Last Office Visit with [facility] or [facility] prescribing provider:  5/26/17        Uric Acid   Date Value Ref Range Status   03/27/2017 2.5 (L) 2.6 - 6.0 mg/dL Final   ]  Creatinine   Date Value Ref Range Status   05/12/2017 1.05 (H) 0.52 - 1.04 mg/dL Final   ]  Lab Results   Component Value Date    .8 03/27/2017     Lab Results   Component Value Date    RBC 2.92 03/27/2017     Lab Results   Component Value Date    HGB 8.3 03/27/2017     Lab Results   Component Value Date    HCT 28.4 03/27/2017     No components found for: MCT  Lab Results   Component Value Date    MCV 97 03/27/2017     Lab Results   Component Value Date    MCH 28.4 03/27/2017     Lab Results   Component Value Date    MCHC 29.2 03/27/2017     Lab Results   Component Value Date    RDW 17.7 03/27/2017     Lab Results   Component Value Date    PLT 87 03/27/2017     Lab Results   Component Value Date    AST 23 03/27/2017     Lab Results   Component Value Date    ALT 27 03/27/2017       Labs showing if normal/abnormal  Lab Results   Component Value Date    URIC 2.5 (L) 03/27/2017     Lab Results   Component Value Date    .8 (HH) 03/27/2017    RBC 2.92 (L) 03/27/2017    HGB 8.3 (L) 03/27/2017    HCT 28.4 (L) 03/27/2017    MCV 97 03/27/2017    MCH 28.4 03/27/2017    MCHC 29.2 (L) 03/27/2017    RDW 17.7 (H) 03/27/2017    PLT 87 (L) 03/27/2017    DTYP Manual Differential 03/27/2017    NEUTROPHIL 5.0 03/27/2017    LYMPH 95.0 03/27/2017    MONOCYTE 0.0 03/27/2017    EOSINOPHIL 0.0 03/27/2017    BASOPHIL 0.0 03/27/2017    IG 0.2 02/01/2015    ANEU 5.6 03/27/2017    ALYM 107.2 (H) 03/27/2017    JOE 0.0 03/27/2017    AEOS 0.0 03/27/2017    ABAS 0.0 03/27/2017    AIG 0.4 02/01/2015      Lab Results   Component Value Date    AST 23 03/27/2017    ALT 27 03/27/2017

## 2017-06-19 ENCOUNTER — TELEPHONE (OUTPATIENT)
Dept: INTERNAL MEDICINE | Facility: CLINIC | Age: 82
End: 2017-06-19

## 2017-06-19 NOTE — TELEPHONE ENCOUNTER
Patient asking if she still needs to take allopurinol 100 mg tablet anymore from hospital 3/17/17.  Provider please review and advise.

## 2017-06-24 DIAGNOSIS — E03.8 OTHER SPECIFIED HYPOTHYROIDISM: ICD-10-CM

## 2017-06-24 RX ORDER — LEVOTHYROXINE SODIUM 75 UG/1
TABLET ORAL
Qty: 90 TABLET | Refills: 0 | Status: SHIPPED | OUTPATIENT
Start: 2017-06-24 | End: 2017-08-21

## 2017-06-24 NOTE — TELEPHONE ENCOUNTER
Levo     Last Written Prescription Date: 3/28/17  Last Quantity: 90, # refills: 0  Last Office Visit with [facility], [facility] or [facility] prescribing provider: 5/26/17        TSH   Date Value Ref Range Status   10/24/2016 2.41 0.40 - 4.00 mU/L Final

## 2017-06-28 DIAGNOSIS — R60.0 LOCALIZED EDEMA: ICD-10-CM

## 2017-06-28 RX ORDER — FUROSEMIDE 20 MG
20 TABLET ORAL DAILY
Qty: 90 TABLET | Refills: 0 | Status: SHIPPED | OUTPATIENT
Start: 2017-06-28 | End: 2017-08-21

## 2017-06-28 NOTE — TELEPHONE ENCOUNTER
Lasix - Pharmacy is requesting 90 day refills      Last Written Prescription Date: 05/18/17  Last Fill Quantity: 30, # refills: 3  Last Office Visit with G, P or [facility] prescribing provider: 05/26/17       Potassium   Date Value Ref Range Status   03/27/2017 4.4 3.4 - 5.3 mmol/L Final     Creatinine   Date Value Ref Range Status   05/12/2017 1.05 (H) 0.52 - 1.04 mg/dL Final     BP Readings from Last 3 Encounters:   05/26/17 104/70   04/03/17 100/60   03/27/17 136/56

## 2017-06-30 DIAGNOSIS — F51.01 PRIMARY INSOMNIA: ICD-10-CM

## 2017-06-30 RX ORDER — TRAZODONE HYDROCHLORIDE 50 MG/1
100 TABLET, FILM COATED ORAL
Qty: 180 TABLET | Refills: 3 | Status: SHIPPED | OUTPATIENT
Start: 2017-06-30 | End: 2017-08-21

## 2017-06-30 NOTE — TELEPHONE ENCOUNTER
Trazodone 50mg       Last Written Prescription Date: 11/14/16  Last Fill Quantity: 30; # refills: 8  Last Office Visit with [facility], [facility] or [facility] prescribing provider:  5/26/17          Lab Results   Component Value Date    AST 23 03/27/2017     Lab Results   Component Value Date    ALT 27 03/27/2017       Labs showing if normal/abnormal  Lab Results   Component Value Date    AST 23 03/27/2017    ALT 27 03/27/2017       Per Dr. Kiser's 5/26/17 office visit notes:  Increase trazodone to 100mg (2 pills) at night.     Order updated to 100mg at bedtime and sent to pharmacy.

## 2017-07-12 ENCOUNTER — TRANSFERRED RECORDS (OUTPATIENT)
Dept: HEALTH INFORMATION MANAGEMENT | Facility: CLINIC | Age: 82
End: 2017-07-12

## 2017-07-13 ENCOUNTER — TELEPHONE (OUTPATIENT)
Dept: INTERNAL MEDICINE | Facility: CLINIC | Age: 82
End: 2017-07-13

## 2017-07-13 NOTE — TELEPHONE ENCOUNTER
Recommend that she go to the ER if she has been increasing her oxygen and has been more fatigued.

## 2017-07-13 NOTE — TELEPHONE ENCOUNTER
Pt calls reporting she has productive cough, SOB, and very fatigued for about 4 days, maybe up to a week.   She is using Oxygen more often. Used her Nebulizer twice this AM.     Coughing up yellow green phlegm. Using her inhalers, advair and albuterol.     No fevers.     No openings at the clinic today or tomorrow.     Please advise.     .

## 2017-07-13 NOTE — TELEPHONE ENCOUNTER
Call to pt. She is declining to go to ED until after supper.   She is going to go then, either to UC or ED>

## 2017-07-28 DIAGNOSIS — J44.1 COPD EXACERBATION (H): ICD-10-CM

## 2017-07-28 NOTE — TELEPHONE ENCOUNTER
Albuterol       Last Written Prescription Date: 9/20/16  Last Fill Quantity: 360 mL, # refills: 1    Last Office Visit with G, P or [facility] prescribing provider:  5/26/17   Future Office Visit: none      Date of Last Asthma Action Plan Letter:   There are no preventive care reminders to display for this patient.   Asthma Control Test: No flowsheet data found.    Date of Last Spirometry Test:   No results found for this or any previous visit.

## 2017-07-31 RX ORDER — ALBUTEROL SULFATE 0.83 MG/ML
SOLUTION RESPIRATORY (INHALATION)
Qty: 300 ML | Refills: 1 | OUTPATIENT
Start: 2017-07-31

## 2017-08-01 ENCOUNTER — TELEPHONE (OUTPATIENT)
Dept: INTERNAL MEDICINE | Facility: CLINIC | Age: 82
End: 2017-08-01

## 2017-08-01 DIAGNOSIS — J44.1 COPD EXACERBATION (H): ICD-10-CM

## 2017-08-01 RX ORDER — ALBUTEROL SULFATE 0.83 MG/ML
1 SOLUTION RESPIRATORY (INHALATION) EVERY 6 HOURS PRN
Qty: 360 ML | Refills: 1 | Status: SHIPPED | OUTPATIENT
Start: 2017-08-01 | End: 2017-08-21

## 2017-08-01 NOTE — TELEPHONE ENCOUNTER
Pt left voice message stating [facility] told her prescription for Albuterol nebulizer solution was denied by our office. Pt unsure why it was denied as she has been taking this for over 14 years.     Per 7/28/17 refill was denied stating too soon. Pt given 360mL on 9/20/16 with one refill.     Albuterol Neb solution       Last Written Prescription Date: 9/20/16  Last Fill Quantity: 360mL, # refills: 1    Last Office Visit with [facility], [facility] or [facility] prescribing provider:  5/26/17   Future Office Visit:       Date of Last Asthma Action Plan Letter:   There are no preventive care reminders to display for this patient.   Asthma Control Test: No flowsheet data found.    Date of Last Spirometry Test:   No results found for this or any previous visit.      Pt takes for COPD.   Prescription approved per [facility] Refill Protocol.   Left voice message for pt (consent to communicate on file) that we were able to provide refill and sent this to [facility] in [place].

## 2017-08-09 ENCOUNTER — OFFICE VISIT (OUTPATIENT)
Dept: INTERNAL MEDICINE | Facility: CLINIC | Age: 82
End: 2017-08-09
Payer: MEDICARE

## 2017-08-09 ENCOUNTER — RADIANT APPOINTMENT (OUTPATIENT)
Dept: GENERAL RADIOLOGY | Facility: CLINIC | Age: 82
End: 2017-08-09
Attending: INTERNAL MEDICINE
Payer: MEDICARE

## 2017-08-09 VITALS
SYSTOLIC BLOOD PRESSURE: 98 MMHG | OXYGEN SATURATION: 92 % | HEART RATE: 70 BPM | DIASTOLIC BLOOD PRESSURE: 52 MMHG | HEIGHT: 59 IN | BODY MASS INDEX: 21.81 KG/M2 | TEMPERATURE: 98.4 F | WEIGHT: 108.2 LBS

## 2017-08-09 DIAGNOSIS — C67.9 MALIGNANT NEOPLASM OF URINARY BLADDER, UNSPECIFIED SITE (H): ICD-10-CM

## 2017-08-09 DIAGNOSIS — C91.10 CLL (CHRONIC LYMPHOCYTIC LEUKEMIA) (H): ICD-10-CM

## 2017-08-09 DIAGNOSIS — E78.5 HYPERLIPIDEMIA WITH TARGET LDL LESS THAN 130: ICD-10-CM

## 2017-08-09 DIAGNOSIS — E03.8 OTHER SPECIFIED HYPOTHYROIDISM: ICD-10-CM

## 2017-08-09 DIAGNOSIS — E78.5 HYPERLIPIDEMIA LDL GOAL <100: ICD-10-CM

## 2017-08-09 DIAGNOSIS — R05.9 COUGH: ICD-10-CM

## 2017-08-09 DIAGNOSIS — I24.9 ACS (ACUTE CORONARY SYNDROME) (H): ICD-10-CM

## 2017-08-09 DIAGNOSIS — R05.9 COUGH: Primary | ICD-10-CM

## 2017-08-09 PROCEDURE — 71020 XR CHEST 2 VW: CPT

## 2017-08-09 PROCEDURE — 99214 OFFICE O/P EST MOD 30 MIN: CPT | Performed by: INTERNAL MEDICINE

## 2017-08-09 RX ORDER — AZITHROMYCIN 250 MG/1
TABLET, FILM COATED ORAL
Qty: 6 TABLET | Refills: 0 | Status: SHIPPED | OUTPATIENT
Start: 2017-08-09 | End: 2017-09-25

## 2017-08-09 RX ORDER — ATORVASTATIN CALCIUM 20 MG/1
TABLET, FILM COATED ORAL
Qty: 90 TABLET | Refills: 4 | Status: SHIPPED | OUTPATIENT
Start: 2017-08-09 | End: 2017-08-21

## 2017-08-09 RX ORDER — PREDNISONE 20 MG/1
20 TABLET ORAL DAILY
Qty: 5 TABLET | Refills: 0 | Status: SHIPPED | OUTPATIENT
Start: 2017-08-09 | End: 2017-09-25

## 2017-08-09 NOTE — NURSING NOTE
"Chief Complaint   Patient presents with     COPD     COPD has been acting up - coughing up green in the morning then goes to yellow - using neb - last couple of weeks have been the worst       Initial BP 98/52 (BP Location: Left arm, Patient Position: Chair, Cuff Size: Adult Regular)  Pulse 70  Temp 98.4  F (36.9  C) (Oral)  Ht 4' 11\" (1.499 m)  Wt 108 lb 3.2 oz (49.1 kg)  SpO2 92%  BMI 21.85 kg/m2 Estimated body mass index is 21.85 kg/(m^2) as calculated from the following:    Height as of this encounter: 4' 11\" (1.499 m).    Weight as of this encounter: 108 lb 3.2 oz (49.1 kg).  Medication Reconciliation: complete   Irish Stoddard MA    "

## 2017-08-09 NOTE — PROGRESS NOTES
"  SUBJECTIVE:                                                    Marie Kline is a 85 year old female who presents to clinic today for the following health issues:    Cough.  She has had a cough for the past week.  She has been coughing up yellow and green phlegm.  No fevers or chills.  She has some shortness of breath.  The nebulizer has been helping.   She has been more fatigued recently.    She has been using advair and albuterol inhaler.     She does have a h/o of pneumonia.     Bladder cancer. S/p surgery and chemo. She is seeing Dr. Nuñez, urology, every 6 months.     Hyperlipidemia. Patient denies muscle aches from cholesterol medication.  She would like a refill today.     Problem list and histories reviewed & adjusted, as indicated.    Reviewed and updated as needed this visit by clinical staff  Tobacco  Allergies  Med Hx  Surg Hx  Fam Hx  Soc Hx      Reviewed and updated as needed this visit by Provider         ROS:  C: NEGATIVE for fever, chills, change in weight  E/M: NEGATIVE for ear, mouth and throat problems  R: POS cough and SOB  CV: NEGATIVE for chest pain, palpitations or peripheral edema    OBJECTIVE:     BP 98/52 (BP Location: Left arm, Patient Position: Chair, Cuff Size: Adult Regular)  Pulse 70  Temp 98.4  F (36.9  C) (Oral)  Ht 4' 11\" (1.499 m)  Wt 108 lb 3.2 oz (49.1 kg)  SpO2 92%  BMI 21.85 kg/m2  Body mass index is 21.85 kg/(m^2).  GENERAL: healthy, alert and no distress  NECK: no adenopathy, no asymmetry, masses, or scars and thyroid normal to palpation  RESP: lungs clear to auscultation - no rales, rhonchi or wheezes  CV: regular rate and rhythm, normal S1 S2, no S3 or S4, no murmur, click or rub, no peripheral edema and peripheral pulses strong      ASSESSMENT/PLAN:       (R05) Cough  (primary encounter diagnosis)  Comment:   Plan: XR Chest 2 Views, azithromycin (ZITHROMAX) 250         MG tablet, predniSONE (DELTASONE) 20 MG tablet            (C91.10) CLL (chronic lymphocytic " leukemia) (HCC)  Comment:   Plan: follows with oncology    (C67.9) Malignant neoplasm of urinary bladder, unspecified site (H)  Comment:   Plan:     (E03.8) Other specified hypothyroidism  Comment:   Plan: TSH with free T4 reflex            (E78.5) Hyperlipidemia LDL goal <100  Comment:   Plan: Lipid panel reflex to direct LDL            (I24.9) ACS (acute coronary syndrome) (H)  Comment: would like refill  Plan: atorvastatin (LIPITOR) 20 MG tablet            (E78.5) Hyperlipidemia with target LDL less than 130  Comment:would like refill  Plan: atorvastatin (LIPITOR) 20 MG tablet       -pt plans on lab draw in September with oncology      Piper Kiser MD  [facility]

## 2017-08-09 NOTE — MR AVS SNAPSHOT
After Visit Summary   8/9/2017    Marie Kline    MRN: [id number]           Patient Information     Date Of Birth          4/28/1932        Visit Information        Provider Department      8/9/2017 3:40 PM Piper Kiser MD [facility]        Today's Diagnoses     Cough    -  1    CLL (chronic lymphocytic leukemia) (HCC)        Malignant neoplasm of urinary bladder, unspecified site (H)        Other specified hypothyroidism        Hyperlipidemia LDL goal <100          Care Instructions    Chest xray today    Future lab of thyroid and cholesterol          Follow-ups after your visit        Your next 10 appointments already scheduled     Sep 07, 2017 10:20 AM CDT   Cystoscopy with Kar Nuñez MD, UB CYF   [facility] ([facility])    [address]  [address]  [facility] [address] [phone]              Future tests that were ordered for you today     Open Future Orders        Priority Expected Expires Ordered    XR Chest 2 Views Routine 8/9/2017 8/9/2018 8/9/2017    Lipid panel reflex to direct LDL Routine  8/9/2018 8/9/2017    TSH with free T4 reflex Routine  8/9/2018 8/9/2017            Who to contact     If you have questions or need follow up information about today's clinic visit or your schedule please contact [facility] directly at [phone].  Normal or non-critical lab and imaging results will be communicated to you by MyChart, letter or phone within 4 business days after the clinic has received the results. If you do not hear from us within 7 days, please contact the clinic through MyChart or phone. If you have a critical or abnormal lab result, we will notify you by phone as soon as possible.  Submit refill requests through 140Fire or call your pharmacy and they will forward the refill request to us. Please allow 3 business days for your refill to be completed.        "   Additional Information About Your Visit        MyChart Information     [employer] (TMG) gives you secure access to your electronic health record. If you see a primary care provider, you can also send messages to your care team and make appointments. If you have questions, please call your primary care clinic.  If you do not have a primary care provider, please call [phone] and they will assist you.        Care EveryWhere ID     This is your Care EveryWhere ID. This could be used by other organizations to access your [facility] medical records  [id number]        Your Vitals Were     Pulse Temperature Height Pulse Oximetry BMI (Body Mass Index)       70 98.4  F (36.9  C) (Oral) 4' 11\" (1.499 m) 92% 21.85 kg/m2        Blood Pressure from Last 3 Encounters:   08/09/17 98/52   05/26/17 104/70   04/03/17 100/60    Weight from Last 3 Encounters:   08/09/17 108 lb 3.2 oz (49.1 kg)   05/26/17 106 lb 3.2 oz (48.2 kg)   05/09/17 106 lb (48.1 kg)                 Today's Medication Changes          These changes are accurate as of: 8/9/17  4:09 PM.  If you have any questions, ask your nurse or doctor.               Start taking these medicines.        Dose/Directions    azithromycin 250 MG tablet   Commonly known as:  ZITHROMAX   Used for:  Cough   Started by:  Piper Kiser MD        Two tablets first day, then one tablet daily for four days.   Quantity:  6 tablet   Refills:  0       predniSONE 20 MG tablet   Commonly known as:  DELTASONE   Used for:  Cough   Started by:  Piper Kiser MD        Dose:  20 mg   Take 1 tablet (20 mg) by mouth daily   Quantity:  5 tablet   Refills:  0            Where to get your medicines      These medications were sent to [facility] [address] [facility] [address] [facility] [address] [facility] [address]  [address] [facility] [address], [facility] [address]     Phone:  [phone]     azithromycin 250 MG tablet    predniSONE 20 MG tablet                Primary Care Provider Office " Phone # Fax #    Piper Catracho Kiser [phone]       [address] [facility] [address]        Equal Access to Services     CÉSARSHIRAZ ITZEL : Hadjignesh juan warner laura Soabelardo, wakwadwoda luqadaha, qaybta kaalmada eugene, zoe chow laperkatelin sykes. So [facility] [phone].    ATENCIÓN: Si habla español, tiene a eaton disposición servicios gratuitos de asistencia lingüística. Llame al [phone].    We comply with applicable federal civil rights laws and [place] laws. We do not discriminate on the basis of race, color, national origin, age, disability sex, sexual orientation or gender identity.            Thank you!     Thank you for choosing [facility]  for your care. Our goal is always to provide you with excellent care. Hearing back from our patients is one way we can continue to improve our services. Please take a few minutes to complete the written survey that you may receive in the mail after your visit with us. Thank you!             Your Updated Medication List - Protect others around you: Learn how to safely use, store and throw away your medicines at www.disposemymeds.org.          This list is accurate as of: 8/9/17  4:09 PM.  Always use your most recent med list.                   Brand Name Dispense Instructions for use Diagnosis    * albuterol 108 (90 BASE) MCG/ACT Inhaler    PROAIR HFA/PROVENTIL HFA/VENTOLIN HFA    1 Inhaler    Inhale 2 puffs into the lungs every 6 hours as needed for wheezing    Intermittent asthma without complication       * albuterol (2.5 MG/3ML) 0.083% neb solution     360 mL    Take 1 vial (2.5 mg) by nebulization every 6 hours as needed for shortness of breath / dyspnea or wheezing    COPD exacerbation (H)       allopurinol 100 MG tablet    ZYLOPRIM    30 tablet    TAKE 1 TABLET BY MOUTH DAILY    Acute gouty arthritis       ascorbic acid 500 MG Tabs      Take 500 mg by mouth daily        atorvastatin 20 MG tablet    LIPITOR    90  tablet    TAKE 1 TABLET (20 MG) BY MOUTH DAILY. NEED APPOINTMENT FOR REFILLS    ACS (acute coronary syndrome) (H), Hyperlipidemia with target LDL less than 130       azithromycin 250 MG tablet    ZITHROMAX    6 tablet    Two tablets first day, then one tablet daily for four days.    Cough       BENADRYL 25 MG capsule   Generic drug:  diphenhydrAMINE      Take 25 mg by mouth At Bedtime        calcium carbonate 1250 MG tablet    OS-CARLOS 500 mg [place]. Ca     Take 500 mg by mouth daily        FERROUS GLUCONATE PO      Take 324 mg by mouth daily (with breakfast)        fluticasone-salmeterol 250-50 MCG/DOSE diskus inhaler    ADVAIR    1 Inhaler    Inhale 1 puff into the lungs every 12 hours    COPD exacerbation (H)       furosemide 20 MG tablet    LASIX    90 tablet    Take 1 tablet (20 mg) by mouth daily    Localized edema       levothyroxine 75 MCG tablet    SYNTHROID/LEVOTHROID    90 tablet    TAKE 1 TABLET (75 MCG) BY MOUTH DAILY    Other specified hypothyroidism       LORazepam 0.5 MG tablet    ATIVAN    20 tablet    Take 1 tablet (0.5 mg) by mouth every 8 hours as needed for anxiety    Anxiety       metoprolol 25 MG 24 hr tablet    TOPROL-XL    90 tablet    Take 1 tablet (25 mg) by mouth daily    ACS (acute coronary syndrome) (H)       MULTIVITAMIN GUMMIES ADULT PO      Take 1 tablet by mouth daily        nystatin [id number] UNIT/ML suspension    MYCOSTATIN    300 mL    Take 5 mLs (500,000 Units) by mouth 4 times daily    Thrush       predniSONE 20 MG tablet    DELTASONE    5 tablet    Take 1 tablet (20 mg) by mouth daily    Cough       traZODone 50 MG tablet    DESYREL    180 tablet    Take 2 tablets (100 mg) by mouth nightly as needed for sleep    Primary insomnia       vitamin D 2000 UNITS tablet     100 tablet    Take 2,000 Units by mouth daily    Vitamin D deficiency       * Notice:  This list has 2 medication(s) that are the same as other medications prescribed for you. Read the directions carefully, and ask your  doctor or other care provider to review them with you.

## 2017-08-10 DIAGNOSIS — E03.8 OTHER SPECIFIED HYPOTHYROIDISM: ICD-10-CM

## 2017-08-10 DIAGNOSIS — I24.9 ACS (ACUTE CORONARY SYNDROME) (H): ICD-10-CM

## 2017-08-10 RX ORDER — METOPROLOL SUCCINATE 25 MG/1
25 TABLET, EXTENDED RELEASE ORAL DAILY
Qty: 90 TABLET | Refills: 2 | OUTPATIENT
Start: 2017-08-10

## 2017-08-10 RX ORDER — LEVOTHYROXINE SODIUM 75 UG/1
TABLET ORAL
Qty: 90 TABLET | Refills: 0 | OUTPATIENT
Start: 2017-08-10

## 2017-08-10 NOTE — TELEPHONE ENCOUNTER
levothyroxine     Last Written Prescription Date: 6/24/17  Last Quantity: 90, # refills: 0  Last Office Visit with [facility] or [facility] prescribing provider: 8/9/17        TSH   Date Value Ref Range Status   10/24/2016 2.41 0.40 - 4.00 mU/L Final     metoprolol      Last Written Prescription Date: 12/28/16  Last Fill Quantity: 90, # refills: 2  Last Office Visit with [facility] or [facility] prescribing provider: 8/9/17       Potassium   Date Value Ref Range Status   03/27/2017 4.4 3.4 - 5.3 mmol/L Final     Creatinine   Date Value Ref Range Status   05/12/2017 1.05 (H) 0.52 - 1.04 mg/dL Final     BP Readings from Last 3 Encounters:   08/09/17 98/52   05/26/17 104/70   04/03/17 100/60

## 2017-08-21 ENCOUNTER — TELEPHONE (OUTPATIENT)
Dept: INTERNAL MEDICINE | Facility: CLINIC | Age: 82
End: 2017-08-21

## 2017-08-21 DIAGNOSIS — E78.5 HYPERLIPIDEMIA WITH TARGET LDL LESS THAN 130: ICD-10-CM

## 2017-08-21 DIAGNOSIS — M10.9 ACUTE GOUTY ARTHRITIS: ICD-10-CM

## 2017-08-21 DIAGNOSIS — J45.20 INTERMITTENT ASTHMA WITHOUT COMPLICATION: ICD-10-CM

## 2017-08-21 DIAGNOSIS — J44.1 COPD EXACERBATION (H): ICD-10-CM

## 2017-08-21 DIAGNOSIS — E03.8 OTHER SPECIFIED HYPOTHYROIDISM: ICD-10-CM

## 2017-08-21 DIAGNOSIS — R60.0 LOCALIZED EDEMA: ICD-10-CM

## 2017-08-21 DIAGNOSIS — B37.0 THRUSH: ICD-10-CM

## 2017-08-21 DIAGNOSIS — I24.9 ACS (ACUTE CORONARY SYNDROME) (H): ICD-10-CM

## 2017-08-21 DIAGNOSIS — F51.01 PRIMARY INSOMNIA: ICD-10-CM

## 2017-08-21 RX ORDER — METOPROLOL SUCCINATE 25 MG/1
25 TABLET, EXTENDED RELEASE ORAL DAILY
Qty: 90 TABLET | Refills: 2 | Status: SHIPPED | OUTPATIENT
Start: 2017-08-21 | End: 2018-04-26

## 2017-08-21 RX ORDER — ATORVASTATIN CALCIUM 20 MG/1
TABLET, FILM COATED ORAL
Qty: 90 TABLET | Refills: 0 | Status: SHIPPED | OUTPATIENT
Start: 2017-08-21 | End: 2017-10-23

## 2017-08-21 RX ORDER — ALBUTEROL SULFATE 90 UG/1
2 AEROSOL, METERED RESPIRATORY (INHALATION) EVERY 6 HOURS PRN
Qty: 1 INHALER | Refills: 8 | Status: SHIPPED | OUTPATIENT
Start: 2017-08-21 | End: 2019-11-25

## 2017-08-21 RX ORDER — NYSTATIN 100000/ML
500000 SUSPENSION, ORAL (FINAL DOSE FORM) ORAL 4 TIMES DAILY
Qty: 300 ML | Refills: 1 | Status: CANCELLED | OUTPATIENT
Start: 2017-08-21

## 2017-08-21 RX ORDER — FUROSEMIDE 20 MG
20 TABLET ORAL DAILY
Qty: 90 TABLET | Refills: 2 | Status: SHIPPED | OUTPATIENT
Start: 2017-08-21 | End: 2018-01-29

## 2017-08-21 RX ORDER — ALBUTEROL SULFATE 0.83 MG/ML
1 SOLUTION RESPIRATORY (INHALATION) EVERY 6 HOURS PRN
Qty: 360 ML | Refills: 1 | Status: SHIPPED | OUTPATIENT
Start: 2017-08-21 | End: 2018-01-29

## 2017-08-21 RX ORDER — ALLOPURINOL 100 MG/1
100 TABLET ORAL DAILY
Qty: 30 TABLET | Refills: 1 | Status: CANCELLED | OUTPATIENT
Start: 2017-08-21

## 2017-08-21 RX ORDER — LEVOTHYROXINE SODIUM 75 UG/1
TABLET ORAL
Qty: 90 TABLET | Refills: 0 | Status: SHIPPED | OUTPATIENT
Start: 2017-08-21 | End: 2017-10-23

## 2017-08-21 RX ORDER — TRAZODONE HYDROCHLORIDE 50 MG/1
100 TABLET, FILM COATED ORAL
Qty: 180 TABLET | Refills: 3 | Status: SHIPPED | OUTPATIENT
Start: 2017-08-21 | End: 2018-01-29

## 2017-08-21 NOTE — TELEPHONE ENCOUNTER
Reason for Call:  Medication or medication refill:    Do you use a [facility]?  Name of the pharmacy and phone number for the current request:  [facility] in [place] off [address]    Name of the medication requested: All of the scripts need to be moved to [facility] in [place].      Other request: none     Can we leave a detailed message on this number? YES    Phone number patient can be reached at: Home number on file [phone] (home)    Best Time: any    Call taken on 8/21/2017 at 10:09 AM by Fatou Walker

## 2017-08-21 NOTE — TELEPHONE ENCOUNTER
Pt calls back. States that she no longer uses Allopurinol or Nystatin. Both medications removed from med list. Advised pt that remainder of medications were refilled.

## 2017-08-21 NOTE — TELEPHONE ENCOUNTER
Left voice message for pt asking if she continues to take Allopurinol or Nystatin. These refills are still pending.     Albuterol neb, Albuterol inhaler, Advair       Last Written Prescription Date: 8/1/17; 2/16/17; 2/16/17  Last Fill Quantity: 360mL; 1; 1, # refills: 1; 10; 10    Last Office Visit with [facility] or  Health prescribing provider:  8/9/17   Future Office Visit:       Date of Last Spirometry Test:   PFT - 1/27/15    Allopurinol 100mg       Last Written Prescription Date: 6/13/17  Last Fill Quantity: 30, # refills: 1  Last Office Visit with [facility] or [facility] prescribing provider:  8/9/17        Uric Acid   Date Value Ref Range Status   03/27/2017 2.5 (L) 2.6 - 6.0 mg/dL Final   ]  Creatinine   Date Value Ref Range Status   05/12/2017 1.05 (H) 0.52 - 1.04 mg/dL Final   ]  Lab Results   Component Value Date    .8 03/27/2017     Lab Results   Component Value Date    RBC 2.92 03/27/2017     Lab Results   Component Value Date    HGB 8.3 03/27/2017     Lab Results   Component Value Date    HCT 28.4 03/27/2017     No components found for: MCT  Lab Results   Component Value Date    MCV 97 03/27/2017     Lab Results   Component Value Date    MCH 28.4 03/27/2017     Lab Results   Component Value Date    MCHC 29.2 03/27/2017     Lab Results   Component Value Date    RDW 17.7 03/27/2017     Lab Results   Component Value Date    PLT 87 03/27/2017     Lab Results   Component Value Date    AST 23 03/27/2017     Lab Results   Component Value Date    ALT 27 03/27/2017     Left message for pt to call to back to see if she is still taking this.     Atorvastatin 20mg     Last Written Prescription Date: 8/9/17  Last Fill Quantity: 90, # refills: 4  Last Office Visit with [facility] or [facility] prescribing provider: 8/9/17       Lab Results   Component Value Date    CHOL 167 10/09/2014     Lab Results   Component Value Date    HDL 55 10/09/2014     Lab Results   Component Value Date    LDL 88 10/09/2014     Lab  Results   Component Value Date    TRIG 121 10/09/2014     Lab Results   Component Value Date    CHOLHDLRATIO 3.0 10/09/2014     Per Dr. Kiser's 8/7/17 OV note:   (E78.5) Hyperlipidemia with target LDL less than 130  Comment:would like refill  Plan: atorvastatin (LIPITOR) 20 MG tablet       -pt plans on lab draw in September with oncology    Medication is being filled for 1 time refill only due to:  Patient needs labs before next refill.     Furosemide 20mg; Metoprolol-XL 25mg      Last Written Prescription Date: 6/28/17; 12/28/16  Last Fill Quantity: 90, # refills: 0; 2  Last Office Visit with [facility] or [facility] prescribing provider: 8/7/17       Potassium   Date Value Ref Range Status   03/27/2017 4.4 3.4 - 5.3 mmol/L Final     Creatinine   Date Value Ref Range Status   05/12/2017 1.05 (H) 0.52 - 1.04 mg/dL Final     BP Readings from Last 3 Encounters:   08/09/17 98/52   05/26/17 104/70   04/03/17 100/60      Prescription approved per [facility] Refill Protocol.     Levothyroxine 75mcg     Last Written Prescription Date: 6/24/17  Last Quantity: 90, # refills: 0  Last Office Visit with [facility] or [facility] prescribing provider: 8/9/17        TSH   Date Value Ref Range Status   10/24/2016 2.41 0.40 - 4.00 mU/L Final     Per Dr. Kiser's 8/7/17 visit notes:   Future lab of thyroid and cholesterol    Medication is being filled for 1 time refill only due to:  Patient needs labs before next refill.     Trazodone 50mg      Last Written Prescription Date: 6/30/17  Last Fill Quantity: 180,  # refills: 3   Last Office Visit with [facility] or [facility] prescribing provider: 8/9/17                                              Pt takes for Insomnia.   Prescription approved per [facility] Refill Protocol.

## 2017-08-28 ENCOUNTER — MYC MEDICAL ADVICE (OUTPATIENT)
Dept: INTERNAL MEDICINE | Facility: CLINIC | Age: 82
End: 2017-08-28

## 2017-08-28 DIAGNOSIS — M10.9 ACUTE GOUTY ARTHRITIS: Primary | ICD-10-CM

## 2017-08-29 RX ORDER — COLCHICINE 0.6 MG/1
0.6 TABLET ORAL 2 TIMES DAILY
Qty: 60 TABLET | Refills: 1 | Status: SHIPPED | OUTPATIENT
Start: 2017-08-29 | End: 2017-08-29

## 2017-08-29 RX ORDER — ALLOPURINOL 100 MG/1
100 TABLET ORAL DAILY
Qty: 90 TABLET | Refills: 1 | Status: SHIPPED | OUTPATIENT
Start: 2017-08-29 | End: 2018-01-29

## 2017-09-07 ENCOUNTER — OFFICE VISIT (OUTPATIENT)
Dept: UROLOGY | Facility: CLINIC | Age: 82
End: 2017-09-07
Payer: MEDICARE

## 2017-09-07 VITALS
BODY MASS INDEX: 21.49 KG/M2 | SYSTOLIC BLOOD PRESSURE: 100 MMHG | HEART RATE: 78 BPM | DIASTOLIC BLOOD PRESSURE: 72 MMHG | WEIGHT: 106.6 LBS | HEIGHT: 59 IN

## 2017-09-07 DIAGNOSIS — Z85.51 PERSONAL HISTORY OF MALIGNANT NEOPLASM OF BLADDER: Primary | ICD-10-CM

## 2017-09-07 DIAGNOSIS — Z85.51 PERSONAL HISTORY OF MALIGNANT NEOPLASM OF BLADDER: ICD-10-CM

## 2017-09-07 LAB
ALBUMIN UR-MCNC: NEGATIVE MG/DL
APPEARANCE UR: CLEAR
BILIRUB UR QL STRIP: NEGATIVE
COLOR UR AUTO: YELLOW
GLUCOSE UR STRIP-MCNC: NEGATIVE MG/DL
HGB UR QL STRIP: ABNORMAL
KETONES UR STRIP-MCNC: NEGATIVE MG/DL
LEUKOCYTE ESTERASE UR QL STRIP: NEGATIVE
NITRATE UR QL: NEGATIVE
PH UR STRIP: 6.5 PH (ref 5–7)
SOURCE: ABNORMAL
SP GR UR STRIP: 1.01 (ref 1–1.03)
UROBILINOGEN UR STRIP-ACNC: 0.2 EU/DL (ref 0.2–1)

## 2017-09-07 PROCEDURE — 81003 URINALYSIS AUTO W/O SCOPE: CPT | Performed by: UROLOGY

## 2017-09-07 PROCEDURE — 52000 CYSTOURETHROSCOPY: CPT | Performed by: UROLOGY

## 2017-09-07 PROCEDURE — 99211 OFF/OP EST MAY X REQ PHY/QHP: CPT | Mod: 25 | Performed by: UROLOGY

## 2017-09-07 RX ORDER — SULFAMETHOXAZOLE/TRIMETHOPRIM 800-160 MG
1 TABLET ORAL ONCE
Qty: 3 TABLET | Refills: 1 | Status: SHIPPED | OUTPATIENT
Start: 2017-09-07 | End: 2017-09-07

## 2017-09-07 ASSESSMENT — PAIN SCALES - GENERAL: PAINLEVEL: NO PAIN (0)

## 2017-09-07 NOTE — MR AVS SNAPSHOT
"              After Visit Summary   9/7/2017    Marie Kline    MRN: [id number]           Patient Information     Date Of Birth          4/28/1932        Visit Information        Provider Department      9/7/2017 10:20 AM Kar Nuñez MD; UB CYF [facility]        Today's Diagnoses     Personal history of malignant neoplasm of bladder          Care Instructions         AFTER YOUR CYSTOSCOPY         You have just completed a cystoscopy, or \"cysto\", which allowed your physician to learn more about your bladder (or to remove a stent placed after surgery). We suggest that you continue to avoid caffeine, fruit juice, and alcohol for the next 24 hours, however, you are encouraged to return to your normal activities.       A few things that are considered normal after your cystoscopy:    * small amount of bleeding (or spotting) that clears within the next 24 hours    * slight burning sensation with urination    * sensation to of needing to avoid more frequently    * the feeling of \"air\" in your urine    * mild discomfort that is relieved with Acetaminophen (Example:Tylenol)        Please contact our office promptly if you:    * develop a fever above 101 degrees    * are unable to urinate, during non-office hours seek Medical Attention.             Follow-ups after your visit        Follow-up notes from your care team     Return in about 6 months (around 3/7/2018) for BTC.      Who to contact     If you have questions or need follow up information about today's clinic visit or your schedule please contact [facility] [place] directly at [phone].  Normal or non-critical lab and imaging results will be communicated to you by MyChart, letter or phone within 4 business days after the clinic has received the results. If you do not hear from us within 7 days, please contact the clinic through MyChart or phone. If you have a critical or " "abnormal lab result, we will notify you by phone as soon as possible.  Submit refill requests through [employer] or call your pharmacy and they will forward the refill request to us. Please allow 3 business days for your refill to be completed.          Additional Information About Your Visit        Vidablehart Information     [employer] gives you secure access to your electronic health record. If you see a primary care provider, you can also send messages to your care team and make appointments. If you have questions, please call your primary care clinic.  If you do not have a primary care provider, please call [phone] and they will assist you.        Care EveryWhere ID     This is your Care EveryWhere ID. This could be used by other organizations to access your [facility] medical records  [id number]        Your Vitals Were     Pulse Height BMI (Body Mass Index)             78 1.499 m (4' 11\") 21.53 kg/m2          Blood Pressure from Last 3 Encounters:   09/07/17 100/72   08/09/17 98/52   05/26/17 104/70    Weight from Last 3 Encounters:   09/07/17 48.4 kg (106 lb 9.6 oz)   08/09/17 49.1 kg (108 lb 3.2 oz)   05/26/17 48.2 kg (106 lb 3.2 oz)              We Performed the Following     CYSTOURETHROSCOPY (18265)     UA without Microscopic          Today's Medication Changes          These changes are accurate as of: 9/7/17 11:22 AM.  If you have any questions, ask your nurse or doctor.               Start taking these medicines.        Dose/Directions    sulfamethoxazole-trimethoprim 800-160 MG per tablet   Commonly known as:  BACTRIM DS/SEPTRA DS   Used for:  Personal history of malignant neoplasm of bladder   Started by:  Kar Nuñez MD        Dose:  1 tablet   Take 1 tablet by mouth once for 1 dose   Quantity:  3 tablet   Refills:  1            Where to get your medicines      These medications were sent to [id number] - [place], [place] - [address]  [address] [facility] [address] [facility] [address]     Phone:  " [phone]     sulfamethoxazole-trimethoprim 800-160 MG per tablet                Primary Care Provider Office Phone # Fax #    Piper Kiser [phone]       [address] [place]  [facility] [address]        Equal Access to Services     RONNA ROBBINS : Hadii aad ku hadasho Soomaali, waaxda luqadaha, qaybta kaalmada adeegyada, waxdenilson prateekin hayestellen adekylee chow olena sykes. So [facility] [phone].    ATENCIÓN: Si habla español, tiene a eaton disposición servicios gratuitos de asistencia lingüística. Llame al [phone].    We comply with applicable federal civil rights laws and [place] laws. We do not discriminate on the basis of race, color, national origin, age, disability sex, sexual orientation or gender identity.            Thank you!     Thank you for choosing [facility] [place]  for your care. Our goal is always to provide you with excellent care. Hearing back from our patients is one way we can continue to improve our services. Please take a few minutes to complete the written survey that you may receive in the mail after your visit with us. Thank you!             Your Updated Medication List - Protect others around you: Learn how to safely use, store and throw away your medicines at www.disposemymeds.org.          This list is accurate as of: 9/7/17 11:22 AM.  Always use your most recent med list.                   Brand Name Dispense Instructions for use Diagnosis    * albuterol (2.5 MG/3ML) 0.083% neb solution     360 mL    Take 1 vial (2.5 mg) by nebulization every 6 hours as needed for shortness of breath / dyspnea or wheezing    COPD exacerbation (H)       * albuterol 108 (90 BASE) MCG/ACT Inhaler    PROAIR HFA/PROVENTIL HFA/VENTOLIN HFA    1 Inhaler    Inhale 2 puffs into the lungs every 6 hours as needed for wheezing    Intermittent asthma without complication       allopurinol 100 MG tablet    ZYLOPRIM    90 tablet    Take 1 tablet (100 mg) by  mouth daily    Acute gouty arthritis       ascorbic acid 500 MG Tabs      Take 500 mg by mouth daily        atorvastatin 20 MG tablet    LIPITOR    90 tablet    TAKE 1 TABLET (20 MG) BY MOUTH DAILY.    ACS (acute coronary syndrome) (H), Hyperlipidemia with target LDL less than 130       azithromycin 250 MG tablet    ZITHROMAX    6 tablet    Two tablets first day, then one tablet daily for four days.    Cough       BENADRYL 25 MG capsule   Generic drug:  diphenhydrAMINE      Take 25 mg by mouth At Bedtime        calcium carbonate 1250 MG tablet    OS-CARLOS 500 mg [place]. Ca     Take 500 mg by mouth daily        FERROUS GLUCONATE PO      Take 324 mg by mouth daily (with breakfast)        fluticasone-salmeterol 250-50 MCG/DOSE diskus inhaler    ADVAIR    1 Inhaler    Inhale 1 puff into the lungs every 12 hours    COPD exacerbation (H)       furosemide 20 MG tablet    LASIX    90 tablet    Take 1 tablet (20 mg) by mouth daily    Localized edema       levothyroxine 75 MCG tablet    SYNTHROID/LEVOTHROID    90 tablet    TAKE 1 TABLET (75 MCG) BY MOUTH DAILY    Other specified hypothyroidism       LORazepam 0.5 MG tablet    ATIVAN    20 tablet    Take 1 tablet (0.5 mg) by mouth every 8 hours as needed for anxiety    Anxiety       metoprolol 25 MG 24 hr tablet    TOPROL-XL    90 tablet    Take 1 tablet (25 mg) by mouth daily    ACS (acute coronary syndrome) (H)       MULTIVITAMIN GUMMIES ADULT PO      Take 1 tablet by mouth daily        predniSONE 20 MG tablet    DELTASONE    5 tablet    Take 1 tablet (20 mg) by mouth daily    Cough       sulfamethoxazole-trimethoprim 800-160 MG per tablet    BACTRIM DS/SEPTRA DS    3 tablet    Take 1 tablet by mouth once for 1 dose    Personal history of malignant neoplasm of bladder       traZODone 50 MG tablet    DESYREL    180 tablet    Take 2 tablets (100 mg) by mouth nightly as needed for sleep    Primary insomnia       vitamin D 2000 UNITS tablet     100 tablet    Take 2,000 Units by  mouth daily    Vitamin D deficiency       * Notice:  This list has 2 medication(s) that are the same as other medications prescribed for you. Read the directions carefully, and ask your doctor or other care provider to review them with you.

## 2017-09-07 NOTE — NURSING NOTE
Prior to the start of the procedure and with procedural staff participation, I verbally confirmed the patient s identity using two indicators, relevant allergies, that the procedure was appropriate and matched the consent or emergent situation, and that the correct equipment/implants were available. Immediately prior to starting the procedure I conducted the Time Out with the procedural staff and re-confirmed the patient s name, procedure, and site/side. (The Joint Commission universal protocol was followed.)  Yes    Sedation (Moderate or Deep): None    Marie was prepped with Betadine . Berna James LPN

## 2017-09-07 NOTE — PROGRESS NOTES
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 Her last letter tumor was in February 2016. She has had no hematuria or difficulty voiding.  Urinalysis: Normal  Flexible cystoscopy-normal urethra, normal bladder mucosa, normal ureteral orifices  Assessment: No recurrence of bladder cancer  Plan: Septra DS ×1 today. Prescription for #3, 1 refill            Follow-up March 2018 for office cystoscopy

## 2017-09-07 NOTE — LETTER
9/7/2017       RE: Marie Kline  [address]  [place] [address]     Dear Colleague,    Thank you for referring your patient, Marie Kline, to the [facility] [place] at [facility]. Please see a copy of my visit note below.    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 Her last letter tumor was in February 2016. She has had no hematuria or difficulty voiding.  Urinalysis: Normal  Flexible cystoscopy-normal urethra, normal bladder mucosa, normal ureteral orifices  Assessment: No recurrence of bladder cancer  Plan: Septra DS ×1 today. Prescription for #3, 1 refill            Follow-up March 2018 for office cystoscopy    Again, thank you for allowing me to participate in the care of your patient.      Sincerely,    Kar Nuñez MD

## 2017-09-12 ENCOUNTER — TRANSFERRED RECORDS (OUTPATIENT)
Dept: HEALTH INFORMATION MANAGEMENT | Facility: CLINIC | Age: 82
End: 2017-09-12

## 2017-09-12 LAB
ALT SERPL-CCNC: 21 IU/L (ref 7–52)
AST SERPL-CCNC: 30 U/L (ref 13–39)
CHOLEST SERPL-MCNC: 145 MG/DL (ref 100–199)
CREAT SERPL-MCNC: 1.21 MG/DL (ref 0.6–1.3)
GFR SERPL CREATININE-BSD FRML MDRD: 40.7 ML/MIN/1.73M2
GLUCOSE SERPL-MCNC: 84 MG/DL (ref 70–110)
HDLC SERPL-MCNC: 44 MG/DL
LDLC SERPL CALC-MCNC: 82 MG/DL
POTASSIUM SERPL-SCNC: 4.2 MMOL/L (ref 3.5–5.1)
TRIGL SERPL-MCNC: 95 MG/DL
TSH SERPL-ACNC: 2.54 UIU/ML (ref 0.32–5)

## 2017-09-17 ENCOUNTER — MYC MEDICAL ADVICE (OUTPATIENT)
Dept: INTERNAL MEDICINE | Facility: CLINIC | Age: 82
End: 2017-09-17

## 2017-09-22 NOTE — TELEPHONE ENCOUNTER
Attempted to contact pt, no answer. Left voice message to call back or check [employer] for message from Dr. Kiser.     Message sent through [employer] with Dr. Kiser's recommendations and asked pt to call back to make appt.

## 2017-09-22 NOTE — TELEPHONE ENCOUNTER
Please let pt know that we can check for blood in the stool.    We should probably have her schedule an appt with all of her symptoms.    I can add her on Monday afternoon to my schedule

## 2017-09-22 NOTE — TELEPHONE ENCOUNTER
Pt calls back and advised appt is recommended, appt scheduled for Monday at 1:20pm. Pt advised to go to ER if symptoms are getting worse over the weekend.

## 2017-09-22 NOTE — TELEPHONE ENCOUNTER
Pt called. Stated that she sent Dr Kiser a my chart message on 9/17 but has not heard back. Relayed I will resend message.     Asked pt what Dr Ivory stated-Pt stated she really didn't say anything

## 2017-09-23 DIAGNOSIS — E03.8 OTHER SPECIFIED HYPOTHYROIDISM: ICD-10-CM

## 2017-09-23 RX ORDER — LEVOTHYROXINE SODIUM 75 UG/1
TABLET ORAL
Qty: 90 TABLET | Refills: 0 | OUTPATIENT
Start: 2017-09-23

## 2017-09-23 NOTE — TELEPHONE ENCOUNTER
TSH   Date Value Ref Range Status   10/24/2016 2.41 0.40 - 4.00 mU/L Final     Med just filled 8-21-17 #90 with 0 refills--with note pt needs labs prior to next refill.  Last labs done 10-24-16    Med refused with reason: duplicate and pt needs labs done (orders in chart).

## 2017-09-25 ENCOUNTER — OFFICE VISIT (OUTPATIENT)
Dept: INTERNAL MEDICINE | Facility: CLINIC | Age: 82
End: 2017-09-25
Payer: MEDICARE

## 2017-09-25 VITALS
BODY MASS INDEX: 21.57 KG/M2 | HEART RATE: 79 BPM | OXYGEN SATURATION: 92 % | DIASTOLIC BLOOD PRESSURE: 70 MMHG | HEIGHT: 59 IN | WEIGHT: 107 LBS | SYSTOLIC BLOOD PRESSURE: 110 MMHG | TEMPERATURE: 98.6 F

## 2017-09-25 DIAGNOSIS — E78.5 HYPERLIPIDEMIA WITH TARGET LDL LESS THAN 130: ICD-10-CM

## 2017-09-25 DIAGNOSIS — R19.7 DIARRHEA, UNSPECIFIED TYPE: Primary | ICD-10-CM

## 2017-09-25 DIAGNOSIS — C91.10 CLL (CHRONIC LYMPHOCYTIC LEUKEMIA) (H): ICD-10-CM

## 2017-09-25 PROCEDURE — 87493 C DIFF AMPLIFIED PROBE: CPT | Performed by: INTERNAL MEDICINE

## 2017-09-25 PROCEDURE — 99213 OFFICE O/P EST LOW 20 MIN: CPT | Performed by: INTERNAL MEDICINE

## 2017-09-25 PROCEDURE — 82272 OCCULT BLD FECES 1-3 TESTS: CPT | Performed by: INTERNAL MEDICINE

## 2017-09-25 NOTE — NURSING NOTE
"Chief Complaint   Patient presents with     Diarrhea     no appetite-hasn't eaten today, loose black stools-started after strating allopurinol     Derm Problem     red/brown \"blood spots\" on left leg\"not bruises\" states pt, check 1mole       Initial /70 (BP Location: Left arm, Cuff Size: Adult Regular)  Pulse 79  Temp 98.6  F (37  C) (Oral)  Ht 4' 11\" (1.499 m)  Wt 107 lb (48.5 kg)  SpO2 92%  Breastfeeding? No  BMI 21.61 kg/m2 Estimated body mass index is 21.61 kg/(m^2) as calculated from the following:    Height as of this encounter: 4' 11\" (1.499 m).    Weight as of this encounter: 107 lb (48.5 kg).  Medication Reconciliation: complete   Na Knott CMA      "

## 2017-09-25 NOTE — MR AVS SNAPSHOT
"              After Visit Summary   9/25/2017    Marie Kline    MRN: [id number]           Patient Information     Date Of Birth          4/28/1932        Visit Information        Provider Department      9/25/2017 1:20 PM Piper Kiser MD [facility]        Today's Diagnoses     Diarrhea, unspecified type    -  1    CLL (chronic lymphocytic leukemia) (HCC)        Hyperlipidemia with target LDL less than 130           Follow-ups after your visit        Who to contact     If you have questions or need follow up information about today's clinic visit or your schedule please contact [facility] directly at [phone].  Normal or non-critical lab and imaging results will be communicated to you by MyChart, letter or phone within 4 business days after the clinic has received the results. If you do not hear from us within 7 days, please contact the clinic through [employer] or phone. If you have a critical or abnormal lab result, we will notify you by phone as soon as possible.  Submit refill requests through [employer] or call your pharmacy and they will forward the refill request to us. Please allow 3 business days for your refill to be completed.          Additional Information About Your Visit        MyChart Information     [employer] gives you secure access to your electronic health record. If you see a primary care provider, you can also send messages to your care team and make appointments. If you have questions, please call your primary care clinic.  If you do not have a primary care provider, please call [phone] and they will assist you.        Care EveryWhere ID     This is your Care EveryWhere ID. This could be used by other organizations to access your [place] medical records  [id number]        Your Vitals Were     Pulse Temperature Height Pulse Oximetry Breastfeeding? BMI (Body Mass Index)    79 98.6  F (37  C) (Oral) 4' 11\" (1.499 m) 92% No 21.61 kg/m2       Blood " Pressure from Last 3 Encounters:   09/25/17 110/70   09/07/17 100/72   08/09/17 98/52    Weight from Last 3 Encounters:   09/25/17 107 lb (48.5 kg)   09/07/17 106 lb 9.6 oz (48.4 kg)   08/09/17 108 lb 3.2 oz (49.1 kg)              We Performed the Following     Occult blood stool        Primary Care Provider Office Phone # Fax #    Piper Kiser [phone]       [address] [facility] [address]        Equal Access to Services     [facility]: Hadii aad ku hadasho Soomaali, waaxda luqadaha, qaybta kaalmada adekyleeyaralph, zoe hyatt . So [facility] [phone].    ATENCIÓN: Si habla español, tiene a eaton disposición servicios gratuitos de asistencia lingüística. [facility] [phone].    We comply with applicable federal civil rights laws and [place] laws. We do not discriminate on the basis of race, color, national origin, age, disability sex, sexual orientation or gender identity.            Thank you!     Thank you for choosing [facility]  for your care. Our goal is always to provide you with excellent care. Hearing back from our patients is one way we can continue to improve our services. Please take a few minutes to complete the written survey that you may receive in the mail after your visit with us. Thank you!             Your Updated Medication List - Protect others around you: Learn how to safely use, store and throw away your medicines at www.disposemymeds.org.          This list is accurate as of: 9/25/17 11:59 PM.  Always use your most recent med list.                   Brand Name Dispense Instructions for use Diagnosis    * albuterol (2.5 MG/3ML) 0.083% neb solution     360 mL    Take 1 vial (2.5 mg) by nebulization every 6 hours as needed for shortness of breath / dyspnea or wheezing    COPD exacerbation (H)       * albuterol 108 (90 BASE) MCG/ACT Inhaler    PROAIR HFA/PROVENTIL HFA/VENTOLIN HFA    1 Inhaler    Inhale 2 puffs into  the lungs every 6 hours as needed for wheezing    Intermittent asthma without complication       allopurinol 100 MG tablet    ZYLOPRIM    90 tablet    Take 1 tablet (100 mg) by mouth daily    Acute gouty arthritis       ascorbic acid 500 MG Tabs      Take 500 mg by mouth daily        atorvastatin 20 MG tablet    LIPITOR    90 tablet    TAKE 1 TABLET (20 MG) BY MOUTH DAILY.    ACS (acute coronary syndrome) (H), Hyperlipidemia with target LDL less than 130       calcium carbonate 1250 MG tablet    OS-CARLOS 500 mg [place]. [place]     Take 500 mg by mouth daily        FERROUS GLUCONATE PO      Take 324 mg by mouth daily (with breakfast)        fluticasone-salmeterol 250-50 MCG/DOSE diskus inhaler    ADVAIR    1 Inhaler    Inhale 1 puff into the lungs every 12 hours    COPD exacerbation (H)       furosemide 20 MG tablet    LASIX    90 tablet    Take 1 tablet (20 mg) by mouth daily    Localized edema       levothyroxine 75 MCG tablet    SYNTHROID/LEVOTHROID    90 tablet    TAKE 1 TABLET (75 MCG) BY MOUTH DAILY    Other specified hypothyroidism       LORazepam 0.5 MG tablet    ATIVAN    20 tablet    Take 1 tablet (0.5 mg) by mouth every 8 hours as needed for anxiety    Anxiety       metoprolol 25 MG 24 hr tablet    TOPROL-XL    90 tablet    Take 1 tablet (25 mg) by mouth daily    ACS (acute coronary syndrome) (H)       MULTIVITAMIN GUMMIES ADULT PO      Take 1 tablet by mouth daily        traZODone 50 MG tablet    DESYREL    180 tablet    Take 2 tablets (100 mg) by mouth nightly as needed for sleep    Primary insomnia       vitamin D 2000 UNITS tablet     100 tablet    Take 2,000 Units by mouth daily    Vitamin D deficiency       * Notice:  This list has 2 medication(s) that are the same as other medications prescribed for you. Read the directions carefully, and ask your doctor or other care provider to review them with you.

## 2017-09-25 NOTE — PROGRESS NOTES
"  SUBJECTIVE:   Marie Kline is a 85 year old female who presents to clinic today for the following health issues:      Black stools, fatigue    Black stools. She has noticed that she has had black stools and diarrhea for a couple of months. She reports that it started when she changed pharamcies- [employer] to [employer].  She had a refill on allopurinol, and then the symptoms started.  The patient reports she then noticed skin lesions in addition to the dark stools and diarrhea.   The patient has diarrhea in the morning 3-4 times.  She reports that the stool was darker.   She reports that everything she eats gives her diarrhea.   No recent antibiotics.  Hospitalized in March.   No recent travel.     Of note, she is followed by oncology for CLL and urology for bladder cancer.    Problem list and histories reviewed & adjusted, as indicated.      Reviewed and updated as needed this visit by clinical staff  Tobacco  Allergies  Meds  Med Hx  Surg Hx  Fam Hx  Soc Hx      Reviewed and updated as needed this visit by Provider         ROS:  C: NEGATIVE for fever, chills, change in weight  R: NEGATIVE for significant cough or SOB  CV: NEGATIVE for chest pain, palpitations or peripheral edema  GI: POS diarrhea and dark stool  Skin: POS skin lesions appreciated    OBJECTIVE:     /70 (BP Location: Left arm, Cuff Size: Adult Regular)  Pulse 79  Temp 98.6  F (37  C) (Oral)  Ht 4' 11\" (1.499 m)  Wt 107 lb (48.5 kg)  SpO2 92%  Breastfeeding? No  BMI 21.61 kg/m2  Body mass index is 21.61 kg/(m^2).  GENERAL: healthy, alert and no distressn  RESP: lungs clear to auscultation - no rales, rhonchi or wheezes  CV: regular rate and rhythm, normal S1 S2, no S3 or S4, no murmur, click or rub, no peripheral edema and peripheral pulses strong  Skin: erythematous lesions diffusely appreicated    ASSESSMENT/PLAN:       (R19.7) Diarrhea, unspecified type  (primary encounter diagnosis)  Comment:   Plan: Occult blood stool, Clostridium " difficile Toxin        B PCR            (C91.10) CLL (chronic lymphocytic leukemia) (HCC)  Comment:   Plan: follows with Dr. Ivory, oncology    (E78.5) Hyperlipidemia with target LDL less than 130  Comment:   Plan: statin    Skin: bruising appreciated.  Monitor for now    Recommend patient hold her allopurinol, as symptoms started when she restarted this medication      Piper Kiser MD  [facility]

## 2017-09-26 DIAGNOSIS — R19.7 DIARRHEA, UNSPECIFIED TYPE: ICD-10-CM

## 2017-09-26 LAB
C DIFF TOX B STL QL: NEGATIVE
SPECIMEN SOURCE: NORMAL

## 2017-09-27 ENCOUNTER — TELEPHONE (OUTPATIENT)
Dept: INTERNAL MEDICINE | Facility: CLINIC | Age: 82
End: 2017-09-27

## 2017-10-04 ENCOUNTER — MYC MEDICAL ADVICE (OUTPATIENT)
Dept: INTERNAL MEDICINE | Facility: CLINIC | Age: 82
End: 2017-10-04

## 2017-10-04 NOTE — TELEPHONE ENCOUNTER
Called pt-relayed below. Pt stated no she did not mean the flu shot, she was just wondering about pneumonia shot. Pt does NOT take pepto bismol. Sched appt for tomorrow at 11am

## 2017-10-04 NOTE — TELEPHONE ENCOUNTER
Please advise pt she has had 2 pneumovax and prevnar 13, no other pneumonia vaccine needed, did she mean flu shot?  Regarding black stools, she has anemia on labs from March, need to repeat and look for sign of GI bleed.  Does she use Pepto Bismal?  She should see PCP ASAP.  Please help her schedule.  Leah Ventura CNP

## 2017-10-05 ENCOUNTER — OFFICE VISIT (OUTPATIENT)
Dept: INTERNAL MEDICINE | Facility: CLINIC | Age: 82
End: 2017-10-05
Payer: MEDICARE

## 2017-10-05 VITALS
TEMPERATURE: 97.9 F | HEIGHT: 59 IN | WEIGHT: 106 LBS | DIASTOLIC BLOOD PRESSURE: 70 MMHG | BODY MASS INDEX: 21.37 KG/M2 | HEART RATE: 78 BPM | SYSTOLIC BLOOD PRESSURE: 110 MMHG | OXYGEN SATURATION: 90 %

## 2017-10-05 DIAGNOSIS — R19.5 DARK STOOLS: Primary | ICD-10-CM

## 2017-10-05 DIAGNOSIS — R19.7 DIARRHEA, UNSPECIFIED TYPE: ICD-10-CM

## 2017-10-05 PROCEDURE — 99214 OFFICE O/P EST MOD 30 MIN: CPT | Performed by: FAMILY MEDICINE

## 2017-10-05 NOTE — PROGRESS NOTES
SUBJECTIVE:   Marie Kline is a 85 year old female who presents to clinic today for the following health issues:    Follow up stool problems.         HISTORY    F/U melena. She also has had some diarrhea. No weakness or faintness. She feels some of this started when her Rx for Allopurinol changed.    Dr Kiser's note reviewed.    Patient Active Problem List   Diagnosis     Acute and chronic respiratory failure with hypercapnia (HCC)     Nonischemic cardiomyopathy (H)     Pneumonia     Acute myocardial infarction, initial episode of care (HCC)     CLL (chronic lymphocytic leukemia) (HCC)     CHF (congestive heart failure) (HCC)     Cardiomyopathy in other diseases classified elsewhere (HCC)     Hyperlipidemia with target LDL less than 130     Health Care Home     COPD exacerbation (H)     LESLY (generalized anxiety disorder)     Hypothyroidism     Back pain with radiation     DDD (degenerative disc disease), lumbar     Splenomegaly     Lumbar degenerative disc disease     Lumbar foraminal stenosis     Central spinal stenosis     Bladder cancer (H)     Sepsis (H)     ACP (advance care planning)     Senile osteoporosis     CKD (chronic kidney disease) stage 3, GFR 30-59 ml/min     Purpura senilis (H)     Xerosis cutis     Malignant neoplasm of urinary bladder, unspecified site (H)     Current Outpatient Prescriptions   Medication Sig Dispense Refill     albuterol (2.5 MG/3ML) 0.083% neb solution Take 1 vial (2.5 mg) by nebulization every 6 hours as needed for shortness of breath / dyspnea or wheezing 360 mL 1     albuterol (PROAIR HFA/PROVENTIL HFA/VENTOLIN HFA) 108 (90 BASE) MCG/ACT Inhaler Inhale 2 puffs into the lungs every 6 hours as needed for wheezing 1 Inhaler 8     fluticasone-salmeterol (ADVAIR) 250-50 MCG/DOSE diskus inhaler Inhale 1 puff into the lungs every 12 hours 1 Inhaler 8     furosemide (LASIX) 20 MG tablet Take 1 tablet (20 mg) by mouth daily 90 tablet 2     metoprolol (TOPROL-XL) 25 MG 24 hr  "tablet Take 1 tablet (25 mg) by mouth daily 90 tablet 2     traZODone (DESYREL) 50 MG tablet Take 2 tablets (100 mg) by mouth nightly as needed for sleep 180 tablet 3     LORazepam (ATIVAN) 0.5 MG tablet Take 1 tablet (0.5 mg) by mouth every 8 hours as needed for anxiety (Patient not taking: Reported on 10/23/2017) 20 tablet 0     calcium carbonate (OS-CARLOS 500 MG [place]. [place]) 500 MG tablet Take 500 mg by mouth daily       Cholecalciferol (VITAMIN D) 2000 UNITS tablet Take 2,000 Units by mouth daily 100 tablet 3     azithromycin (ZITHROMAX) 250 MG tablet Two tablets first day, then one tablet daily for four days. 6 tablet 0     levothyroxine (SYNTHROID/LEVOTHROID) 75 MCG tablet Take 1 tablet (75 mcg) by mouth daily 90 tablet 2     allopurinol (ZYLOPRIM) 100 MG tablet Take 1 tablet (100 mg) by mouth daily 90 tablet 1       REVIEW OF SYSTEMS    No SOB  No CP or palpitation  No abd pain  No weakness      Past Medical History:   Diagnosis Date     Bladder cancer (H)      Cardiomyopathy (H)      CLL (chronic lymphocytic leukemia) (H)      Congestive heart failure (H) 10/24/2014    flash pulm edema ass w/Takotsubo     COPD (chronic obstructive pulmonary disease) (H)     noc O2     Hypothyroid      Mumps      Myocardial infarction 10/2014    Takotsubo presentation w/mild CAD     Pulmonary edema cardiac cause (H) 10/08/2014    associated w/Takotsubo ACS     Tricuspid valve disorders, 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    TR 2-3+ per echo       EXAM  /70 (BP Location: Left arm, Patient Position: Chair, Cuff Size: Adult Regular)  Pulse 78  Temp 97.9  F (36.6  C) (Oral)  Ht 4' 11\" (1.499 m)  Wt 106 lb (48.1 kg)  SpO2 90%  Breastfeeding? No  BMI 21.41 kg/m2    Alert, NAD  Color  Normal  Neck: neg  Chest: cl  CV RSR no murmur  Skin: unremarkable      (R19.5) Dark stools  (primary encounter diagnosis)  Comment: improved.  Plan:     (R19.7) Diarrhea, unspecified type  Comment: ? Secondary to med  Plan:         Allopurinol " - ask your Oncologist    Try stopping Atorvastatin for 1 month.

## 2017-10-05 NOTE — MR AVS SNAPSHOT
"              After Visit Summary   10/5/2017    Marie Kline    MRN: [id number]           Patient Information     Date Of Birth          4/28/1932        Visit Information        Provider Department      10/5/2017 11:00 AM Chato Huang MD [facility]        Today's Diagnoses     Dark stools    -  1    Diarrhea, unspecified type          Care Instructions      Allopurinol - ask your Oncologist    Try stopping Atorvastatin for 1 month.          Follow-ups after your visit        Who to contact     If you have questions or need follow up information about today's clinic visit or your schedule please contact [facility] directly at [phone].  Normal or non-critical lab and imaging results will be communicated to you by [employer]Plan[employer], letter or phone within 4 business days after the clinic has received the results. If you do not hear from us within 7 days, please contact the clinic through [employer] or phone. If you have a critical or abnormal lab result, we will notify you by phone as soon as possible.  Submit refill requests through [employer] or call your pharmacy and they will forward the refill request to us. Please allow 3 business days for your refill to be completed.          Additional Information About Your Visit        MyChart Information     [employer] gives you secure access to your electronic health record. If you see a primary care provider, you can also send messages to your care team and make appointments. If you have questions, please call your primary care clinic.  If you do not have a primary care provider, please call [phone] and they will assist you.        Care EveryWhere ID     This is your Care EveryWhere ID. This could be used by other organizations to access your [place] medical records  [id number]        Your Vitals Were     Pulse Temperature Height Pulse Oximetry Breastfeeding? BMI (Body Mass Index)    78 97.9  F (36.6  C) (Oral) 4' 11\" (1.499 m) 90% No " 21.41 kg/m2       Blood Pressure from Last 3 Encounters:   10/05/17 110/70   09/25/17 110/70   09/07/17 100/72    Weight from Last 3 Encounters:   10/05/17 106 lb (48.1 kg)   09/25/17 107 lb (48.5 kg)   09/07/17 106 lb 9.6 oz (48.4 kg)              Today, you had the following     No orders found for display       Primary Care Provider Office Phone # Fax #    Piper Catracho Kiser [phone]       [address] [facility] [address]        Equal Access to Services     [facility]: Hadii aad ku hadasho Soomaali, waaxda luqadaha, qaybta kaalmada adeegyada, zoe ocampo adekylee hyatt . So [facility] [phone].    ATENCIÓN: Si habla español, tiene a eaton disposición servicios gratuitos de asistencia lingüística. Llame al [phone].    We comply with applicable federal civil rights laws and [place] laws. We do not discriminate on the basis of race, color, national origin, age, disability, sex, sexual orientation, or gender identity.            Thank you!     Thank you for choosing [facility]  for your care. Our goal is always to provide you with excellent care. Hearing back from our patients is one way we can continue to improve our services. Please take a few minutes to complete the written survey that you may receive in the mail after your visit with us. Thank you!             Your Updated Medication List - Protect others around you: Learn how to safely use, store and throw away your medicines at www.disposemymeds.org.          This list is accurate as of: 10/5/17 11:37 AM.  Always use your most recent med list.                   Brand Name Dispense Instructions for use Diagnosis    * albuterol (2.5 MG/3ML) 0.083% neb solution     360 mL    Take 1 vial (2.5 mg) by nebulization every 6 hours as needed for shortness of breath / dyspnea or wheezing    COPD exacerbation (H)       * albuterol 108 (90 BASE) MCG/ACT Inhaler    PROAIR HFA/PROVENTIL HFA/VENTOLIN HFA    1  Inhaler    Inhale 2 puffs into the lungs every 6 hours as needed for wheezing    Intermittent asthma without complication       allopurinol 100 MG tablet    ZYLOPRIM    90 tablet    Take 1 tablet (100 mg) by mouth daily    Acute gouty arthritis       ascorbic acid 500 MG Tabs      Take 500 mg by mouth daily        atorvastatin 20 MG tablet    LIPITOR    90 tablet    TAKE 1 TABLET (20 MG) BY MOUTH DAILY.    ACS (acute coronary syndrome) (H), Hyperlipidemia with target LDL less than 130       calcium carbonate 1250 MG tablet    OS-CARLOS 500 mg [place]. [place]     Take 500 mg by mouth daily        FERROUS GLUCONATE PO      Take 324 mg by mouth daily (with breakfast)        fluticasone-salmeterol 250-50 MCG/DOSE diskus inhaler    ADVAIR    1 Inhaler    Inhale 1 puff into the lungs every 12 hours    COPD exacerbation (H)       furosemide 20 MG tablet    LASIX    90 tablet    Take 1 tablet (20 mg) by mouth daily    Localized edema       levothyroxine 75 MCG tablet    SYNTHROID/LEVOTHROID    90 tablet    TAKE 1 TABLET (75 MCG) BY MOUTH DAILY    Other specified hypothyroidism       LORazepam 0.5 MG tablet    ATIVAN    20 tablet    Take 1 tablet (0.5 mg) by mouth every 8 hours as needed for anxiety    Anxiety       metoprolol 25 MG 24 hr tablet    TOPROL-XL    90 tablet    Take 1 tablet (25 mg) by mouth daily    ACS (acute coronary syndrome) (H)       MULTIVITAMIN GUMMIES ADULT PO      Take 1 tablet by mouth daily        traZODone 50 MG tablet    DESYREL    180 tablet    Take 2 tablets (100 mg) by mouth nightly as needed for sleep    Primary insomnia       vitamin D 2000 UNITS tablet     100 tablet    Take 2,000 Units by mouth daily    Vitamin D deficiency       * Notice:  This list has 2 medication(s) that are the same as other medications prescribed for you. Read the directions carefully, and ask your doctor or other care provider to review them with you.

## 2017-10-05 NOTE — NURSING NOTE
"Chief Complaint   Patient presents with     RECHECK     Diarrhea       Initial /70 (BP Location: Left arm, Patient Position: Chair, Cuff Size: Adult Regular)  Pulse 78  Temp 97.9  F (36.6  C) (Oral)  Ht 4' 11\" (1.499 m)  Wt 106 lb (48.1 kg)  SpO2 90%  Breastfeeding? No  BMI 21.41 kg/m2 Estimated body mass index is 21.41 kg/(m^2) as calculated from the following:    Height as of this encounter: 4' 11\" (1.499 m).    Weight as of this encounter: 106 lb (48.1 kg).  Medication Reconciliation: complete    "

## 2017-10-10 DIAGNOSIS — E03.8 OTHER SPECIFIED HYPOTHYROIDISM: ICD-10-CM

## 2017-10-11 RX ORDER — LEVOTHYROXINE SODIUM 75 UG/1
TABLET ORAL
Qty: 90 TABLET | Refills: 0 | OUTPATIENT
Start: 2017-10-11

## 2017-10-11 NOTE — TELEPHONE ENCOUNTER
Levothyroxine     Last Written Prescription Date: 08/21/17  Last Quantity: 90, # refills: 0  Last Office Visit with G, P or [facility] prescribing provider: 10/05/17 Huang        TSH   Date Value Ref Range Status   09/12/2017 2.54 0.32 - 5.0 uIU/mL Final

## 2017-10-15 ENCOUNTER — MYC MEDICAL ADVICE (OUTPATIENT)
Dept: INTERNAL MEDICINE | Facility: CLINIC | Age: 82
End: 2017-10-15

## 2017-10-15 DIAGNOSIS — R19.7 DIARRHEA, UNSPECIFIED TYPE: Primary | ICD-10-CM

## 2017-10-23 ENCOUNTER — OFFICE VISIT (OUTPATIENT)
Dept: PHARMACY | Facility: CLINIC | Age: 82
End: 2017-10-23
Payer: COMMERCIAL

## 2017-10-23 VITALS — HEART RATE: 68 BPM | SYSTOLIC BLOOD PRESSURE: 105 MMHG | DIASTOLIC BLOOD PRESSURE: 64 MMHG | OXYGEN SATURATION: 92 %

## 2017-10-23 DIAGNOSIS — E03.9 HYPOTHYROIDISM, UNSPECIFIED TYPE: Primary | ICD-10-CM

## 2017-10-23 DIAGNOSIS — R19.7 DIARRHEA, UNSPECIFIED TYPE: ICD-10-CM

## 2017-10-23 DIAGNOSIS — C91.10 CLL (CHRONIC LYMPHOCYTIC LEUKEMIA) (H): ICD-10-CM

## 2017-10-23 DIAGNOSIS — F41.1 GAD (GENERALIZED ANXIETY DISORDER): ICD-10-CM

## 2017-10-23 DIAGNOSIS — E78.5 HYPERLIPIDEMIA WITH TARGET LDL LESS THAN 130: ICD-10-CM

## 2017-10-23 DIAGNOSIS — G47.00 INSOMNIA, UNSPECIFIED TYPE: ICD-10-CM

## 2017-10-23 DIAGNOSIS — E03.8 OTHER SPECIFIED HYPOTHYROIDISM: ICD-10-CM

## 2017-10-23 DIAGNOSIS — J44.9 CHRONIC OBSTRUCTIVE PULMONARY DISEASE, UNSPECIFIED COPD TYPE (H): ICD-10-CM

## 2017-10-23 DIAGNOSIS — I50.22 CHRONIC SYSTOLIC CONGESTIVE HEART FAILURE (H): ICD-10-CM

## 2017-10-23 DIAGNOSIS — I10 ESSENTIAL HYPERTENSION WITH GOAL BLOOD PRESSURE LESS THAN 140/90: ICD-10-CM

## 2017-10-23 DIAGNOSIS — R19.7 DIARRHEA: ICD-10-CM

## 2017-10-23 PROCEDURE — 99605 MTMS BY PHARM NP 15 MIN: CPT | Performed by: PHARMACIST

## 2017-10-23 PROCEDURE — 99607 MTMS BY PHARM ADDL 15 MIN: CPT | Performed by: PHARMACIST

## 2017-10-23 NOTE — MR AVS SNAPSHOT
After Visit Summary   10/23/2017    Marie Kline    MRN: [id number]           Patient Information     Date Of Birth          4/28/1932        Visit Information        Provider Department      10/23/2017 3:00 PM Yuliana Cast, [facility] MTM        Care Instructions    Recommendations from today's MTM visit:                                                    MTM (medication therapy management) is a service provided by a clinical pharmacist designed to help you get the most of out of your medicines.   Today we reviewed what your medicines are for, how to know if they are working, that your medicines are safe and how to make your medicine regimen as easy as possible.     1.  Call Oncologist about holding allopurinol to see if diarrhea symptoms improve    2.  No indication for Omega 3 - okay to discontinue    3.  You should be taking vitamin D 2000 units daily and calcium 1200 mg daily (including your diet)    4.  Call [place] Prescription Assistance to see if you qualify for help with the cost of your Advair.  If you do not qualify, then we can look at alternative nebs for you.    5.  If we can't find a cause for the diarrhea, Culturelle (probiotic) or Metamucil could be tried to help with your symptoms    Next MTM visit: 1 month    To schedule another MTM appointment, please call the clinic directly or you may call the MTM scheduling line at [phone] or toll-free at [phone].     My Clinical Pharmacist's contact information:                                                      It was a pleasure seeing you today!  Please feel free to contact me with any questions or concerns you have.      Yuliana Cast , Pharm D  [phone] (phone)  [phone] (pager)  Medication Therapy Management Pharmacist     You may receive a survey about the MTM services you received.  I would appreciate your feedback to help me serve you better in the future. Please fill it out and return it when you  can. Your comments will be anonymous.                      Follow-ups after your visit        Who to contact     If you have questions or need follow up information about today's clinic visit or your schedule please contact [facility] directly at [phone].  Normal or non-critical lab and imaging results will be communicated to you by MyChart, letter or phone within 4 business days after the clinic has received the results. If you do not hear from us within 7 days, please contact the clinic through [facility] [employer] or phone. If you have a critical or abnormal lab result, we will notify you by phone as soon as possible.  Submit refill requests through [email or web address] or call your pharmacy and they will forward the refill request to us. Please allow 3 business days for your refill to be completed.          Additional Information About Your Visit        Service [facility] Information     [email or web address] gives you secure access to your electronic health record. If you see a primary care provider, you can also send messages to your care team and make appointments. If you have questions, please call your primary care clinic.  If you do not have a primary care provider, please call [phone] and they will assist you.        Care EveryWhere ID     This is your Care EveryWhere ID. This could be used by other organizations to access your [facility] medical records  [id number]        Your Vitals Were     Pulse Pulse Oximetry                68 92%           Blood Pressure from Last 3 Encounters:   10/23/17 105/64   10/05/17 110/70   09/25/17 110/70    Weight from Last 3 Encounters:   10/05/17 106 lb (48.1 kg)   09/25/17 107 lb (48.5 kg)   09/07/17 106 lb 9.6 oz (48.4 kg)              Today, you had the following     No orders found for display       Primary Care Provider Office Phone # Fax #    Piper Kiser [phone]       [address]  [facility] [address]        Equal Access to Services     RONNA TALAVERA: Claudia martinez  timmy Cast, wakwadwoda luqadaha, qaybta kaaljohnathan knight, zoe prateekin hayaakatelin barkerkylee robjackie laperkatelin onel. So [facility] [phone].    ATENCIÓN: Si habla español, tiene a eaton disposición servicios gratuitos de asistencia lingüística. Carol al [phone].    We comply with applicable federal civil rights laws and [place] laws. We do not discriminate on the basis of race, color, national origin, age, disability, sex, sexual orientation, or gender identity.            Thank you!     Thank you for choosing [facility]  for your care. Our goal is always to provide you with excellent care. Hearing back from our patients is one way we can continue to improve our services. Please take a few minutes to complete the written survey that you may receive in the mail after your visit with us. Thank you!             Your Updated Medication List - Protect others around you: Learn how to safely use, store and throw away your medicines at www.disposemymeds.org.          This list is accurate as of: 10/23/17  3:48 PM.  Always use your most recent med list.                   Brand Name Dispense Instructions for use Diagnosis    * albuterol (2.5 MG/3ML) 0.083% neb solution     360 mL    Take 1 vial (2.5 mg) by nebulization every 6 hours as needed for shortness of breath / dyspnea or wheezing    COPD exacerbation (H)       * albuterol 108 (90 BASE) MCG/ACT Inhaler    PROAIR HFA/PROVENTIL HFA/VENTOLIN HFA    1 Inhaler    Inhale 2 puffs into the lungs every 6 hours as needed for wheezing    Intermittent asthma without complication       allopurinol 100 MG tablet    ZYLOPRIM    90 tablet    Take 1 tablet (100 mg) by mouth daily    Acute gouty arthritis       calcium carbonate 1250 MG tablet    OS-CARLOS 500 mg [place]. Ca     Take 500 mg by mouth daily        fluticasone-salmeterol 250-50 MCG/DOSE diskus inhaler    ADVAIR    1 Inhaler    Inhale 1 puff into the lungs every 12 hours    COPD exacerbation (H)       furosemide 20 MG tablet     LASIX    90 tablet    Take 1 tablet (20 mg) by mouth daily    Localized edema       levothyroxine 75 MCG tablet    SYNTHROID/LEVOTHROID    90 tablet    TAKE 1 TABLET (75 MCG) BY MOUTH DAILY    Other specified hypothyroidism       LORazepam 0.5 MG tablet    ATIVAN    20 tablet    Take 1 tablet (0.5 mg) by mouth every 8 hours as needed for anxiety    Anxiety       metoprolol 25 MG 24 hr tablet    TOPROL-XL    90 tablet    Take 1 tablet (25 mg) by mouth daily    ACS (acute coronary syndrome) (H)       traZODone 50 MG tablet    DESYREL    180 tablet    Take 2 tablets (100 mg) by mouth nightly as needed for sleep    Primary insomnia       vitamin D 2000 UNITS tablet     100 tablet    Take 2,000 Units by mouth daily    Vitamin D deficiency       * Notice:  This list has 2 medication(s) that are the same as other medications prescribed for you. Read the directions carefully, and ask your doctor or other care provider to review them with you.

## 2017-10-23 NOTE — PROGRESS NOTES
SUBJECTIVE/OBJECTIVE:                           Marie Kline is a 85 year old female coming in for an initial visit for Medication Therapy Management.  She was referred to me from [facility].  Her daughter, Margaret joined us today.       Chief Complaint: no energy, no motivation    Allergies/ADRs: Reviewed in Epic  Tobacco: No tobacco use  Alcohol: not currently using  Caffeine: decaff coffee  Activity: reports bouts of fatigue; active at home with dog - walks up to 20 minutes   PMH: Reviewed in Epic    Medication Adherence: no issues reported    Diarrhea: Symptoms for past 4-6 weeks.  Doesn't matter what she eats it goes right through her.  Down to 104 lbs from 108 lbs.    Eating cheese does help.  Stopped atorvastatin - no change in diarrhea.  Stopped the ferous gluconate and color of the stools improved.    CLL:  Taking allopurinol 100 mg daily (started in March).  Doesn't recall prior gout flare.  Has been advised to reach out to Oncology about holding the allopurinol.  Started by Oncology.  Uric Acid   Date Value Ref Range Status   03/27/2017 2.5 (L) 2.6 - 6.0 mg/dL Final   ]  Hyperlipidemia: Current therapy includes no current medications - stopped atorvastatin due to GI upset.  Has been on statin for some time.    Hypothyroidism: Patient is taking levothyroxine 75 mcg daily. Patient is having the following symptoms: none.    Insomnia: Current medications include: trazodone 100 mg every night. Pt reports trouble staying asleep.     HFpEF/HTN:: Current medications include metoprolol succinate 25 mg daily and furosemide 20 mg once daily.  Patient reports no current medication side effects.   ECHO:  Date 3/17/17, EF 60-65%  Pt is not complaining of sx of HF  Pt is not measuring daily weights  Patient does not self-monitor BP.   Pt is following a low sodium diet, is avoiding EtOH. Patient has not had episodes of falls, but does report having signs of dizziness.    Supplements: Taking vitamin D 3000 units daily (has both  gummies 1000 units and an oral tablet 2000 units with her today) and Multivitamin with omega 3    COPD: Current medications: Albuterol MDI and Nebs once dailyand Fluticasone+Salmeterol (Advair) MDI once daily at night.   Pt is not experiencing side effects.   Pt reports the following symptoms: none.  Pt does not have an COPD Action Plan on file.   Has spirometry been completed: Yes     Anxiety: She was taking lorazepam as needed; less than once per month.  Margaret reports mood has been good.  Enjoys taking her dog, Мария to visit in the memory care unit.    Current labs include:BP Readings from Last 3 Encounters:   10/05/17 110/70   09/25/17 110/70   09/07/17 100/72     Today's Vitals: /64  Pulse 68  SpO2 92%  Lab Results   Component Value Date    A1C 5.6 06/17/2016   .  Lab Results   Component Value Date    CHOL 145 09/12/2017     Lab Results   Component Value Date    TRIG 95 09/12/2017     Lab Results   Component Value Date    HDL 44 09/12/2017     Lab Results   Component Value Date    LDL 82 09/12/2017       Liver Function Studies -   Recent Labs   Lab Test 09/12/17 03/27/17   0550   PROTTOTAL   --   4.4*   ALBUMIN   --   2.4*   BILITOTAL   --   0.3   ALKPHOS   --   87   AST  30  23   ALT  21  27     Last Basic Metabolic Panel:  Lab Results   Component Value Date     03/27/2017      Lab Results   Component Value Date    POTASSIUM 4.2 09/12/2017     Lab Results   Component Value Date    CHLORIDE 104 03/27/2017     Lab Results   Component Value Date    BUN 18 03/27/2017     Lab Results   Component Value Date    CR 1.21 09/12/2017     GFR Estimate   Date Value Ref Range Status   09/12/2017 40.7 (L) >60.0 ml/min/1.73m2 Final   05/12/2017 50 (L) >60 mL/min/1.7m2 Final     Comment:     Non  GFR Calc   03/27/2017 78 >60 mL/min/1.7m2 Final     Comment:     Non  GFR Calc     GFR Estimate If Black   Date Value Ref Range Status   05/12/2017 60 (L) >60 mL/min/1.7m2 Final      Comment:      GFR Calc   03/27/2017 >90   GFR Calc   >60 mL/min/1.7m2 Final   03/26/2017 >90   GFR Calc   >60 mL/min/1.7m2 Final     TSH   Date Value Ref Range Status   09/12/2017 2.54 0.32 - 5.0 uIU/mL Final   ]    Most Recent Immunizations   Administered Date(s) Administered     HepA-Adult 04/08/2009     HepB-Adult 04/08/2009     Influenza (High Dose) 3 valent vaccine 09/21/2017     Influenza (IIV3) 09/24/2014     Influenza (intradermal) 09/24/2013     Mantoux 02/22/2016     Pneumococcal (PCV 13) 09/22/2015     Pneumococcal 23 valent 09/24/2013     Td/Tdap (adult) Unspecified Formulation 07/10/2014     Tdap (Adacel,Boostrix) 07/10/2014       ASSESSMENT:                             Current medications were reviewed today.       Medication Adherence: no issues identified    Diarrhea: Needs improvement. Patient may benefit from addition of probiotic or fiber supplment if symptoms do not improve off allopurinol or if Oncologist does not want her to stop    CLL: Stable. She will continue to see her Oncologist regarding GI symptoms and ongoing need of allopurinol.    Hyperlipidemia: Needs Improvement. Pt is not on statin which is indicated based on 2013 ACC/AHA guidelines for lipid management.  Patient would benefit from restarting her statin therapy if it was determined not to be the cause of upset GI    Hypothyroidism: Stable. Last TSH is within normal limits.     Insomnia: Stable. Pt may benefit from sleep apnea    HFpEF/HTN: Stable. Patient is meeting BP goal of < 140/90mmHg.  Current weight is stable and diuretic dose is appropriate. Pt would benefit from no changes in current therapy.     COPD: Needs Improvement. Patient would benefit from using her Advair twice daily.  Provided information on [place] Prescription Assistance Program; if she doesn't qualify for aid with Advair may consider changing to nebs which should be covered under Part D.    Supplements: Reviewed  daily intake goals of calcium and vitamin D.    Anxiety: Stable    PLAN:                          1.  Call Oncologist about holding allopurinol to see if diarrhea symptoms improve    2.  No indication for Omega 3 - okay to discontinue    3.  Vitamin D 2000 units daily and calcium 1200 mg daily (including your diet)    4.  Call [place] Prescription Assistance to see if you qualify for help with the cost of your Advair.  If you do not qualify, then we can look at alternative nebs for you.    5.  If we can't find a cause for the diarrhea, Culturelle (probiotic) or Metamucil could be tried to help with your symptoms    6. If diarrhea continues, discuss restarting your Atorvastatin 20 mg daily.with Dr. Huang      I spent 60 minutes with this patient today. I offer these suggestions for consideration by the PCP. A copy of the visit note was provided to the patient's primary care provider.    Will follow up in 2 months.    The patient was given a summary of these recommendations as an after visit summary.     Tunde Plascencia, Pharmacy Student    Yuliana Cast , Pharm D  [phone] (phone)  [phone] (pager)  Medication Therapy Management Pharmacist

## 2017-10-23 NOTE — PATIENT INSTRUCTIONS
Recommendations from today's MTM visit:                                                    MTM (medication therapy management) is a service provided by a clinical pharmacist designed to help you get the most of out of your medicines.   Today we reviewed what your medicines are for, how to know if they are working, that your medicines are safe and how to make your medicine regimen as easy as possible.     1.  Call Oncologist about holding allopurinol to see if diarrhea symptoms improve    2.  No indication for Omega 3 - okay to discontinue    3.  You should be taking vitamin D 2000 units daily and calcium 1200 mg daily (including your diet)    4.  Call [place] Prescription Assistance to see if you qualify for help with the cost of your Advair.  If you do not qualify, then we can look at alternative nebs for you.    5.  If we can't find a cause for the diarrhea, Culturelle (probiotic) or Metamucil could be tried to help with your symptoms    Next MTM visit: 1 month    To schedule another MTM appointment, please call the clinic directly or you may call the MTM scheduling line at [phone] or toll-free at [phone].     My Clinical Pharmacist's contact information:                                                      It was a pleasure seeing you today!  Please feel free to contact me with any questions or concerns you have.      Yuliana Cast , Pharm D  [phone] (phone)  [phone] (pager)  Medication Therapy Management Pharmacist     You may receive a survey about the MTM services you received.  I would appreciate your feedback to help me serve you better in the future. Please fill it out and return it when you can. Your comments will be anonymous.

## 2017-10-24 RX ORDER — LEVOTHYROXINE SODIUM 75 UG/1
75 TABLET ORAL DAILY
Qty: 90 TABLET | Refills: 2 | Status: SHIPPED | OUTPATIENT
Start: 2017-10-24 | End: 2018-11-01

## 2017-10-30 ENCOUNTER — TELEPHONE (OUTPATIENT)
Dept: INTERNAL MEDICINE | Facility: CLINIC | Age: 82
End: 2017-10-30

## 2017-10-30 NOTE — TELEPHONE ENCOUNTER
Reason for Call:  Medication or medication refill:Med Refill    Do you use a [facility]?  Name of the pharmacy and phone number for the current request:  [facility] Tel: [phone]    Name of the medication requested: metoprolol (TOPROL-XL) 25 MG 24 hr tablet, Trazodone and blood pressure medication    Other request: Pt need refill    Can we leave a detailed message on this number? YES    Phone number patient can be reached at: Cell number on file:    Telephone Information:   Mobile [phone]       Best Time: anytime    Call taken on 10/30/2017 at 1:26 PM by STEVEN BARRERA

## 2017-10-30 NOTE — TELEPHONE ENCOUNTER
Pt should not need refills right now. Contacted pt, informed her most of the prescriptions were refilled in August and should have refills available. Pt became confused when this RN advised her to call [facility] to get refills. She state that they told her it was too soon for refill in August and only gave her a partial prescription.     Pt states that she is almost out of Metoprolol, Trazodone and Levothyroxine. This RN will call the pharmacy to check if they have refills on file. Contacted pharmacy, they have prescriptions on file for pt - they will deliver Levothyroxine tomorrow and started requests for Trazodone and Metoprolol. They do not have enough Trazodone in stock right now, but expect the shipment tomorrow.    Contacted pt informed her levothyroxine will be delivered tomorrow, unsure if Metoprolol and Trazodone will be shipped tomorrow or next week. Pt states she has enough of both for 1 week. Pt advised that next month she can call [facility] and enter the prescription number on the bottle for her refill. Pt verbalizes understanding.

## 2017-11-15 ENCOUNTER — MYC MEDICAL ADVICE (OUTPATIENT)
Dept: INTERNAL MEDICINE | Facility: CLINIC | Age: 82
End: 2017-11-15

## 2017-11-15 DIAGNOSIS — J20.9 ACUTE BRONCHITIS, UNSPECIFIED ORGANISM: Primary | ICD-10-CM

## 2017-11-15 RX ORDER — AZITHROMYCIN 250 MG/1
TABLET, FILM COATED ORAL
Qty: 6 TABLET | Refills: 0 | Status: SHIPPED | OUTPATIENT
Start: 2017-11-15 | End: 2018-01-08

## 2018-01-08 ENCOUNTER — OFFICE VISIT (OUTPATIENT)
Dept: PHARMACY | Facility: CLINIC | Age: 83
End: 2018-01-08
Payer: COMMERCIAL

## 2018-01-08 ENCOUNTER — HOSPITAL ENCOUNTER (OUTPATIENT)
Dept: MAMMOGRAPHY | Facility: CLINIC | Age: 83
Discharge: HOME | End: 2018-01-08
Attending: NURSE PRACTITIONER | Admitting: NURSE PRACTITIONER
Payer: MEDICARE

## 2018-01-08 VITALS
DIASTOLIC BLOOD PRESSURE: 53 MMHG | HEART RATE: 68 BPM | SYSTOLIC BLOOD PRESSURE: 105 MMHG | OXYGEN SATURATION: 90 % | BODY MASS INDEX: 21.53 KG/M2 | WEIGHT: 106.6 LBS

## 2018-01-08 DIAGNOSIS — E03.9 HYPOTHYROIDISM, UNSPECIFIED TYPE: ICD-10-CM

## 2018-01-08 DIAGNOSIS — G47.00 INSOMNIA, UNSPECIFIED TYPE: ICD-10-CM

## 2018-01-08 DIAGNOSIS — E63.9 NUTRITIONAL DEFICIENCY: ICD-10-CM

## 2018-01-08 DIAGNOSIS — F41.1 GAD (GENERALIZED ANXIETY DISORDER): ICD-10-CM

## 2018-01-08 DIAGNOSIS — R19.7 DIARRHEA, UNSPECIFIED TYPE: ICD-10-CM

## 2018-01-08 DIAGNOSIS — J30.89 CHRONIC NONSEASONAL ALLERGIC RHINITIS DUE TO POLLEN: ICD-10-CM

## 2018-01-08 DIAGNOSIS — I50.22 CHRONIC SYSTOLIC CONGESTIVE HEART FAILURE (H): ICD-10-CM

## 2018-01-08 DIAGNOSIS — C91.10 CLL (CHRONIC LYMPHOCYTIC LEUKEMIA) (H): ICD-10-CM

## 2018-01-08 DIAGNOSIS — J44.1 COPD EXACERBATION (H): ICD-10-CM

## 2018-01-08 DIAGNOSIS — I10 ESSENTIAL HYPERTENSION WITH GOAL BLOOD PRESSURE LESS THAN 140/90: ICD-10-CM

## 2018-01-08 DIAGNOSIS — Z12.31 ENCOUNTER FOR SCREENING MAMMOGRAM FOR HIGH-RISK PATIENT: ICD-10-CM

## 2018-01-08 DIAGNOSIS — E78.5 HYPERLIPIDEMIA WITH TARGET LDL LESS THAN 130: Primary | ICD-10-CM

## 2018-01-08 PROCEDURE — 77067 SCR MAMMO BI INCL CAD: CPT

## 2018-01-08 PROCEDURE — 99605 MTMS BY PHARM NP 15 MIN: CPT | Performed by: PHARMACIST

## 2018-01-08 PROCEDURE — 99607 MTMS BY PHARM ADDL 15 MIN: CPT | Performed by: PHARMACIST

## 2018-01-08 RX ORDER — FORMOTEROL FUMARATE DIHYDRATE 20 UG/2ML
20 SOLUTION RESPIRATORY (INHALATION) EVERY 12 HOURS
Qty: 120 ML | Refills: 3 | Status: SHIPPED | OUTPATIENT
Start: 2018-01-08 | End: 2018-01-08

## 2018-01-08 RX ORDER — FORMOTEROL FUMARATE DIHYDRATE 20 UG/2ML
20 SOLUTION RESPIRATORY (INHALATION) EVERY 12 HOURS
Qty: 120 ML | Refills: 3 | Status: SHIPPED | OUTPATIENT
Start: 2018-01-08 | End: 2018-01-29

## 2018-01-08 RX ORDER — ATORVASTATIN CALCIUM 20 MG/1
20 TABLET, FILM COATED ORAL DAILY
Qty: 30 TABLET | Refills: 1
Start: 2018-01-08 | End: 2018-04-24

## 2018-01-08 RX ORDER — FERROUS SULFATE 325(65) MG
325 TABLET ORAL
COMMUNITY
End: 2018-01-29

## 2018-01-08 NOTE — MR AVS SNAPSHOT
After Visit Summary   1/8/2018    Marie Kline    MRN: [id number]           Patient Information     Date Of Birth          4/28/1932        Visit Information        Provider Department      1/8/2018 10:30 AM Yuliana Cast, [facility]        Today's Diagnoses     Hyperlipidemia with target LDL less than 130    -  1    COPD exacerbation (H)          Care Instructions    Recommendations from today's MTM visit:                                                      1. Stop Benadryl.  For the runny nose you can try using the Nasacort every day.  If that doesn't help then you can try Claritin (loratadine) 10 mg daily , Zytrec (cetirizine) 10 mg at night or Allegra (fexofenadine) once daily 60 mg daily.      2. Use Advair every morning until your inhaler is gone and Perforomist nebs (new today) at night.  Once your Advair is gone you should use the Perforomist twice daily.  Only use albuterol nebs or inhaler as needed versus every day.    3.  Calcium intake goal 1200 mg daily - including diet (milk, cheese, green leafy veggies)      Next MTM visit: 3 months    To schedule another MTM appointment, please call the clinic directly or you may call the MTM scheduling line at [phone] or toll-free at [phone].     My Clinical Pharmacist's contact information:                                                      It was a pleasure seeing you today!  Please feel free to contact me with any questions or concerns you have.      Yuliana Cast , Pharm D  [phone] (phone)  [phone] (pager)  Medication Therapy Management Pharmacist     You may receive a survey about the MT services you received.  I would appreciate your feedback to help me serve you better in the future. Please fill it out and return it when you can. Your comments will be anonymous.              Follow-ups after your visit        Your next 10 appointments already scheduled     Mar 08, 2018  9:30 AM CST   Cystoscopy with  Kar Nuñez MD, UB CYF   [facility] ([facility])    [address] [facility]  [address]  [facility] [address] [phone]              Who to contact     If you have questions or need follow up information about today's clinic visit or your schedule please contact [facility] directly at [phone].  Normal or non-critical lab and imaging results will be communicated to you by hikehart, letter or phone within 4 business days after the clinic has received the results. If you do not hear from us within 7 days, please contact the clinic through [employer] or phone. If you have a critical or abnormal lab result, we will notify you by phone as soon as possible.  Submit refill requests through [employer] or call your pharmacy and they will forward the refill request to us. Please allow 3 business days for your refill to be completed.          Additional Information About Your Visit        hikeharShuttersong Information     [employer] gives you secure access to your electronic health record. If you see a primary care provider, you can also send messages to your care team and make appointments. If you have questions, please call your primary care clinic.  If you do not have a primary care provider, please call [phone] and they will assist you.        Care EveryWhere ID     This is your Care EveryWhere ID. This could be used by other organizations to access your [facility] medical records  [id number]        Your Vitals Were     Pulse Pulse Oximetry BMI (Body Mass Index)             68 90% 21.53 kg/m2          Blood Pressure from Last 3 Encounters:   01/08/18 105/53   10/23/17 105/64   10/05/17 110/70    Weight from Last 3 Encounters:   01/08/18 106 lb 9.6 oz (48.4 kg)   10/05/17 106 lb (48.1 kg)   09/25/17 107 lb (48.5 kg)              We Performed the Following     COPD ACTION PLAN          Today's Medication Changes          These changes are accurate as  of: 1/8/18 11:38 AM.  If you have any questions, ask your nurse or doctor.               Start taking these medicines.        Dose/Directions    atorvastatin 20 MG tablet   Commonly known as:  LIPITOR   Used for:  Hyperlipidemia with target LDL less than 130        Dose:  20 mg   Take 1 tablet (20 mg) by mouth daily   Quantity:  30 tablet   Refills:  1       formoterol 20 MCG/2ML neb solution   Commonly known as:  PERFOROMIST   Used for:  COPD exacerbation (H)        Dose:  20 mcg   Take 2 mLs (20 mcg) by nebulization every 12 hours   Quantity:  120 mL   Refills:  3         Stop taking these medicines if you haven't already. Please contact your care team if you have questions.     fluticasone-salmeterol 250-50 MCG/DOSE diskus inhaler   Commonly known as:  ADVAIR                Where to get your medicines      These medications were sent to [id number] - [place], [place] - [address]  [address] [facility] [address]     Phone:  [phone]     formoterol 20 MCG/2ML neb solution         Some of these will need a paper prescription and others can be bought over the counter.  Ask your nurse if you have questions.     You don't need a prescription for these medications     atorvastatin 20 MG tablet                Primary Care Provider Office Phone # Fax #    Piper Kiser [phone]       [address] [facility] [address]        Equal Access to Services     SHIRAZ ROBBINS AH: Hadii juan warner hadevelioo Soabelardo, waaxda luqadaha, qaybta kaalmada adesuzie, zoe sykes. So [facility] [phone].    ATENCIÓN: Si habla español, tiene a eaton disposición servicios gratuitos de asistencia lingüística. Carol al [phone].    We comply with applicable federal civil rights laws and [place] laws. We do not discriminate on the basis of race, color, national origin, age, disability, sex, sexual orientation, or gender identity.            Thank you!     Thank you for  choosing [facility]  for your care. Our goal is always to provide you with excellent care. Hearing back from our patients is one way we can continue to improve our services. Please take a few minutes to complete the written survey that you may receive in the mail after your visit with us. Thank you!             Your Updated Medication List - Protect others around you: Learn how to safely use, store and throw away your medicines at www.disposemymeds.org.          This list is accurate as of: 1/8/18 11:38 AM.  Always use your most recent med list.                   Brand Name Dispense Instructions for use Diagnosis    * albuterol (2.5 MG/3ML) 0.083% neb solution     360 mL    Take 1 vial (2.5 mg) by nebulization every 6 hours as needed for shortness of breath / dyspnea or wheezing    COPD exacerbation (H)       * albuterol 108 (90 BASE) MCG/ACT Inhaler    PROAIR HFA/PROVENTIL HFA/VENTOLIN HFA    1 Inhaler    Inhale 2 puffs into the lungs every 6 hours as needed for wheezing    Intermittent asthma without complication       allopurinol 100 MG tablet    ZYLOPRIM    90 tablet    Take 1 tablet (100 mg) by mouth daily    Acute gouty arthritis       atorvastatin 20 MG tablet    LIPITOR    30 tablet    Take 1 tablet (20 mg) by mouth daily    Hyperlipidemia with target LDL less than 130       calcium carbonate 1250 MG tablet    OS-CARLOS 500 mg [place]. Ca     Take 500 mg by mouth daily        ferrous sulfate 325 (65 FE) MG tablet    IRON     Take 325 mg by mouth daily (with breakfast)        formoterol 20 MCG/2ML neb solution    PERFOROMIST    120 mL    Take 2 mLs (20 mcg) by nebulization every 12 hours    COPD exacerbation (H)       furosemide 20 MG tablet    LASIX    90 tablet    Take 1 tablet (20 mg) by mouth daily    Localized edema       levothyroxine 75 MCG tablet    SYNTHROID/LEVOTHROID    90 tablet    Take 1 tablet (75 mcg) by mouth daily    Other specified hypothyroidism       LORazepam 0.5 MG tablet    ATIVAN     20 tablet    Take 1 tablet (0.5 mg) by mouth every 8 hours as needed for anxiety    Anxiety       metoprolol 25 MG 24 hr tablet    TOPROL-XL    90 tablet    Take 1 tablet (25 mg) by mouth daily    ACS (acute coronary syndrome) (H)       traZODone 50 MG tablet    DESYREL    180 tablet    Take 2 tablets (100 mg) by mouth nightly as needed for sleep    Primary insomnia       vitamin D 2000 UNITS tablet     100 tablet    Take 2,000 Units by mouth daily    Vitamin D deficiency       * Notice:  This list has 2 medication(s) that are the same as other medications prescribed for you. Read the directions carefully, and ask your doctor or other care provider to review them with you.

## 2018-01-08 NOTE — PATIENT INSTRUCTIONS
Recommendations from today's MTM visit:                                                      1. Stop Benadryl.  For the runny nose you can try using the Nasacort every day.  If that doesn't help then you can try Claritin (loratadine) 10 mg daily , Zytrec (cetirizine) 10 mg at night or Allegra (fexofenadine) once daily 60 mg daily.      2. Use Advair every morning until your inhaler is gone and Perforomist nebs (new today) at night.  Once your Advair is gone you should use the Perforomist twice daily.  Only use albuterol nebs or inhaler as needed versus every day.    3.  Calcium intake goal 1200 mg daily - including diet (milk, cheese, green leafy veggies)      Next MTM visit: 3 months    To schedule another MTM appointment, please call the clinic directly or you may call the MTM scheduling line at [phone] or toll-free at [phone].     My Clinical Pharmacist's contact information:                                                      It was a pleasure seeing you today!  Please feel free to contact me with any questions or concerns you have.      Yuliana Cast , Pharm D  [phone] (phone)  [phone] (pager)  Medication Therapy Management Pharmacist     You may receive a survey about the MTM services you received.  I would appreciate your feedback to help me serve you better in the future. Please fill it out and return it when you can. Your comments will be anonymous.

## 2018-01-08 NOTE — PROGRESS NOTES
SUBJECTIVE/OBJECTIVE:                           Marie Kline is a 85 year old female coming in for a follow-up visit for Medication Therapy Management.  She was referred to me from [facility].    First visit in 2018; last MTM visit from 10/23/17    Chief Complaint: no energy, always tired    Allergies/ADRs: Reviewed in Epic  Tobacco: No tobacco use  Alcohol: not currently using  Caffeine: decaff coffee  Activity: reports bouts of fatigue; active at home with dog - walks up to 20 minutes   PMH: Reviewed in Epic    Medication Adherence: sets up own pill box    Diarrhea: Symptoms resolved.   No therapy at this time.    Allergic rhinitis: Current medications include diphenhydramine 25 mg once daily. Primary symptoms are runny nose. Pt feels that current therapy is effective.  Also has Nasacort at home but not using.  Doesn't recall trying other antihistamines.  She is tired every day and expressed concerns about memory changes. She believes she needs something year around.    CLL:  Taking allopurinol 100 mg daily (started in March).  Doesn't recall prior gout flare.   Reports bladder tumor.  Followed by Dr. Kahler.    Uric Acid   Date Value Ref Range Status   03/27/2017 2.5 (L) 2.6 - 6.0 mg/dL Final   ]  Hyperlipidemia: Current therapy includes atorvastatin 20 mg daily.  Has been on statin for some time.    Hypothyroidism: Patient is taking levothyroxine 75 mcg daily. Patient is having the following symptoms: fatigue    Insomnia: Current medications include: trazodone 100 mg every night. Pt reports trouble staying asleep.  Reports getting 10-11 hours at night and still needing naps during the day.    HFpEF/HTN:: Current medications include metoprolol succinate 25 mg daily and furosemide 20 mg once daily.  Patient reports no current medication side effects.   ECHO:  Date 3/17/17, EF 60-65%  Pt is not complaining of sx of HF  Pt is not measuring daily weights  Patient does not self-monitor BP.   Pt is following a low sodium  diet, is avoiding EtOH. Patient has not had episodes of falls, but does report having signs of dizziness.    Supplements: Taking ferrous sulfate once daily an dvitamin D 3000 units daily (has both gummies 1000 units and an oral tablet 2000 units with her today).  Calcium is on her med list but she did not have that bottle with her today and doesn't believe she has been taking it. Getting some calcium with milk her in diet.    COPD: Current medications: Albuterol MDI as needed and Nebs once daily in the morning (to help with phelgm) and Fluticasone+Salmeterol (Advair) MDI once daily in the afternoon (doesn't like to take it later in the day because it makes her excited).  Pt is not experiencing side effects.   Pt reports the following symptoms: productive cough  Pt does not have an COPD Action Plan on file.   Has spirometry been completed: Yes     Anxiety: She was taking lorazepam as needed; less than once per month.  Used last in Dec and doesn't feel like she needs more right now. Enjoys taking her dog, Мария to visit in the memory care unit.    Current labs include:  BP Readings from Last 3 Encounters:   10/23/17 105/64   10/05/17 110/70   09/25/17 110/70     Today's Vitals: There were no vitals taken for this visit.  Lab Results   Component Value Date    A1C 5.6 06/17/2016   .  Lab Results   Component Value Date    CHOL 145 09/12/2017     Lab Results   Component Value Date    TRIG 95 09/12/2017     Lab Results   Component Value Date    HDL 44 09/12/2017     Lab Results   Component Value Date    LDL 82 09/12/2017       Liver Function Studies -   Recent Labs   Lab Test 09/12/17 03/27/17   0550   PROTTOTAL   --   4.4*   ALBUMIN   --   2.4*   BILITOTAL   --   0.3   ALKPHOS   --   87   AST  30  23   ALT  21  27     Last Basic Metabolic Panel:  Lab Results   Component Value Date     03/27/2017      Lab Results   Component Value Date    POTASSIUM 4.2 09/12/2017     Lab Results   Component Value Date    CHLORIDE  104 03/27/2017     Lab Results   Component Value Date    BUN 18 03/27/2017     Lab Results   Component Value Date    CR 1.21 09/12/2017     GFR Estimate   Date Value Ref Range Status   09/12/2017 40.7 (L) >60.0 ml/min/1.73m2 Final   05/12/2017 50 (L) >60 mL/min/1.7m2 Final     Comment:     Non  GFR Calc   03/27/2017 78 >60 mL/min/1.7m2 Final     Comment:     Non  GFR Calc     GFR Estimate If Black   Date Value Ref Range Status   05/12/2017 60 (L) >60 mL/min/1.7m2 Final     Comment:      GFR Calc   03/27/2017 >90   GFR Calc   >60 mL/min/1.7m2 Final   03/26/2017 >90   GFR Calc   >60 mL/min/1.7m2 Final     TSH   Date Value Ref Range Status   09/12/2017 2.54 0.32 - 5.0 uIU/mL Final   ]    Most Recent Immunizations   Administered Date(s) Administered     HepA-Adult 04/08/2009     HepB-Adult 04/08/2009     Influenza (High Dose) 3 valent vaccine 09/21/2017     Influenza (IIV3) PF 09/24/2014     Influenza (intradermal) 09/24/2013     Mantoux Tuberculin Skin Test 02/22/2016     Pneumo Conj 13-V (2010&after) 09/22/2015     Pneumococcal 23 valent 09/24/2013     Td/Tdap (adult) Unspecified Formulation 07/10/2014     Tdap (Adacel,Boostrix) 07/10/2014       ASSESSMENT:                             Current medications were reviewed today.       Medication Adherence: no issues identified    Diarrhea: Resolved.      Allergic rhinitis: recommend d/c diphenhydramine, this may be contributing to her fatigue and is not recommended due to anticholinergic risk in the geriatric population.  She can try the Nasacort daily or an alternative antihistamine such as loratatdine    CLL:  Stable    Hyperlipidemia: stable    Hypothyroidism: stable    Insomnia: stable    HFpEF/HTN: stable    Supplements: reviewed calcium intake goal 1200 mg daily    COPD: dicussed daily controller use vs rescue.   She doesn't like taking ICS before bed.  Will see if we can get LABA nebs  covered under part D and if taking BID helps control her symptoms better so she is not needing MELISSA daily.  She just purchased an Advair (+$300) and doesn't want to waste it.    Anxiety: Stable off therapy    PLAN:                          1. Stop Benadryl.  For the runny nose you can try using the Nasacort every day.  If that doesn't help then you can try Claritin (loratadine) 10 mg daily , Zytrec (cetirizine) 10 mg at night or Allegra (fexofenadine) once daily 60 mg daily.      2. Use Advair every morning until your inhaler is gone and Perforomist nebs (new today) at night.  Once your Advair is gone you should use the Perforomist twice daily.  Only use albuterol nebs or inhaler as needed versus every day.    3.  Calcium intake goal 1200 mg daily - including diet (milk, cheese, green leafy veggies)        I spent 45 minutes with this patient today. I offer these suggestions for consideration by the PCP. A copy of the visit note was provided to the patient's primary care provider.    Will follow up in 3 months.    The patient was given a summary of these recommendations as an after visit summary.     Yuliana Cast , Pharm D  [phone] (phone)  [phone] (pager)  Medication Therapy Management Pharmacist

## 2018-01-17 ENCOUNTER — TELEPHONE (OUTPATIENT)
Dept: INTERNAL MEDICINE | Facility: CLINIC | Age: 83
End: 2018-01-17

## 2018-01-17 NOTE — TELEPHONE ENCOUNTER
Patient calling for her mammogram results from 1/8/18. No result notes.  Provider please review and advise. Thank you.

## 2018-01-24 ENCOUNTER — MYC MEDICAL ADVICE (OUTPATIENT)
Dept: INTERNAL MEDICINE | Facility: CLINIC | Age: 83
End: 2018-01-24

## 2018-01-24 DIAGNOSIS — N18.30 CKD (CHRONIC KIDNEY DISEASE) STAGE 3, GFR 30-59 ML/MIN (H): Primary | ICD-10-CM

## 2018-01-26 NOTE — TELEPHONE ENCOUNTER
Called patient     Ordered bmp  Please fax bmp order to Dr. Ivory's office.          Discussed not needing allopurinol.

## 2018-01-28 ENCOUNTER — NURSE TRIAGE (OUTPATIENT)
Dept: NURSING | Facility: CLINIC | Age: 83
End: 2018-01-28

## 2018-01-28 NOTE — TELEPHONE ENCOUNTER
Patient requesting prescription for prednisone or antibiotic be called in.  Oral temperature if 99.7; states coughing up thick yellow phlegm.  FNA contact on call, Dr. Payan, who didn't feel comfortable calling in prescription and suggest patient be seen or contact PCP tomorrow.  FNA informed patient of on call recommendation.  Reason for Disposition    [1] Known COPD or other severe lung disease (i.e., bronchiectasis, cystic fibrosis, lung surgery) AND [2] worsening symptoms (i.e., increased sputum purulence or amount, increased breathing difficulty    Protocols used: COUGH - ACUTE PRODUCTIVE-ADULT-AH

## 2018-01-29 ENCOUNTER — HOSPITAL ENCOUNTER (INPATIENT)
Facility: CLINIC | Age: 83
LOS: 5 days | Discharge: HOME HEALTH | DRG: 193 | End: 2018-02-03
Attending: EMERGENCY MEDICINE | Admitting: INTERNAL MEDICINE
Payer: MEDICARE

## 2018-01-29 ENCOUNTER — APPOINTMENT (OUTPATIENT)
Dept: GENERAL RADIOLOGY | Facility: CLINIC | Age: 83
DRG: 193 | End: 2018-01-29
Attending: EMERGENCY MEDICINE
Payer: MEDICARE

## 2018-01-29 ENCOUNTER — TELEPHONE (OUTPATIENT)
Dept: INTERNAL MEDICINE | Facility: CLINIC | Age: 83
End: 2018-01-29

## 2018-01-29 DIAGNOSIS — G47.00 INSOMNIA, UNSPECIFIED TYPE: Primary | ICD-10-CM

## 2018-01-29 DIAGNOSIS — J10.1 INFLUENZA A: ICD-10-CM

## 2018-01-29 DIAGNOSIS — R09.02 HYPOXIA: ICD-10-CM

## 2018-01-29 LAB
ANION GAP SERPL CALCULATED.3IONS-SCNC: 7 MMOL/L (ref 3–14)
ANISOCYTOSIS BLD QL SMEAR: SLIGHT
BASOPHILS # BLD AUTO: 0 10E9/L (ref 0–0.2)
BASOPHILS NFR BLD AUTO: 0 %
BUN SERPL-MCNC: 14 MG/DL (ref 7–30)
CALCIUM SERPL-MCNC: 8.9 MG/DL (ref 8.5–10.1)
CHLORIDE SERPL-SCNC: 96 MMOL/L (ref 94–109)
CO2 SERPL-SCNC: 30 MMOL/L (ref 20–32)
CREAT SERPL-MCNC: 1.06 MG/DL (ref 0.52–1.04)
DIFFERENTIAL METHOD BLD: ABNORMAL
EOSINOPHIL # BLD AUTO: 0 10E9/L (ref 0–0.7)
EOSINOPHIL NFR BLD AUTO: 0 %
ERYTHROCYTE [DISTWIDTH] IN BLOOD BY AUTOMATED COUNT: 15.7 % (ref 10–15)
FLUAV+FLUBV AG SPEC QL: NEGATIVE
FLUAV+FLUBV AG SPEC QL: POSITIVE
GFR SERPL CREATININE-BSD FRML MDRD: 49 ML/MIN/1.7M2
GLUCOSE SERPL-MCNC: 122 MG/DL (ref 70–99)
HCT VFR BLD AUTO: 41.3 % (ref 35–47)
HGB BLD-MCNC: 13.6 G/DL (ref 11.7–15.7)
LYMPHOCYTES # BLD AUTO: 40.9 10E9/L (ref 0.8–5.3)
LYMPHOCYTES NFR BLD AUTO: 90 %
MCH RBC QN AUTO: 30.2 PG (ref 26.5–33)
MCHC RBC AUTO-ENTMCNC: 32.9 G/DL (ref 31.5–36.5)
MCV RBC AUTO: 92 FL (ref 78–100)
MONOCYTES # BLD AUTO: 2.7 10E9/L (ref 0–1.3)
MONOCYTES NFR BLD AUTO: 6 %
NEUTROPHILS # BLD AUTO: 1.8 10E9/L (ref 1.6–8.3)
NEUTROPHILS NFR BLD AUTO: 4 %
OVALOCYTES BLD QL SMEAR: SLIGHT
PLATELET # BLD AUTO: 95 10E9/L (ref 150–450)
PLATELET # BLD EST: ABNORMAL 10*3/UL
POTASSIUM SERPL-SCNC: 4.1 MMOL/L (ref 3.4–5.3)
PROCALCITONIN SERPL-MCNC: 0.26 NG/ML
RBC # BLD AUTO: 4.5 10E12/L (ref 3.8–5.2)
SODIUM SERPL-SCNC: 133 MMOL/L (ref 133–144)
SPECIMEN SOURCE: ABNORMAL
WBC # BLD AUTO: 45.4 10E9/L (ref 4–11)

## 2018-01-29 PROCEDURE — 12000000 ZZH R&B MED SURG/OB

## 2018-01-29 PROCEDURE — 36415 COLL VENOUS BLD VENIPUNCTURE: CPT | Performed by: EMERGENCY MEDICINE

## 2018-01-29 PROCEDURE — 99207 ZZC DOWN CODE DUE TO INITIAL EXAM: CPT | Performed by: INTERNAL MEDICINE

## 2018-01-29 PROCEDURE — 87804 INFLUENZA ASSAY W/OPTIC: CPT | Performed by: EMERGENCY MEDICINE

## 2018-01-29 PROCEDURE — 80048 BASIC METABOLIC PNL TOTAL CA: CPT | Performed by: EMERGENCY MEDICINE

## 2018-01-29 PROCEDURE — 85025 COMPLETE CBC W/AUTO DIFF WBC: CPT | Performed by: EMERGENCY MEDICINE

## 2018-01-29 PROCEDURE — 84145 PROCALCITONIN (PCT): CPT | Performed by: INTERNAL MEDICINE

## 2018-01-29 PROCEDURE — 40000275 ZZH STATISTIC RCP TIME EA 10 MIN

## 2018-01-29 PROCEDURE — 87040 BLOOD CULTURE FOR BACTERIA: CPT | Performed by: EMERGENCY MEDICINE

## 2018-01-29 PROCEDURE — A9270 NON-COVERED ITEM OR SERVICE: HCPCS | Mod: GY | Performed by: EMERGENCY MEDICINE

## 2018-01-29 PROCEDURE — 25000125 ZZHC RX 250: Performed by: EMERGENCY MEDICINE

## 2018-01-29 PROCEDURE — 84145 PROCALCITONIN (PCT): CPT | Performed by: EMERGENCY MEDICINE

## 2018-01-29 PROCEDURE — 94640 AIRWAY INHALATION TREATMENT: CPT | Mod: 76

## 2018-01-29 PROCEDURE — 99221 1ST HOSP IP/OBS SF/LOW 40: CPT | Mod: AI | Performed by: INTERNAL MEDICINE

## 2018-01-29 PROCEDURE — 94640 AIRWAY INHALATION TREATMENT: CPT

## 2018-01-29 PROCEDURE — 71046 X-RAY EXAM CHEST 2 VIEWS: CPT

## 2018-01-29 PROCEDURE — 25000125 ZZHC RX 250: Performed by: INTERNAL MEDICINE

## 2018-01-29 PROCEDURE — 25000132 ZZH RX MED GY IP 250 OP 250 PS 637: Mod: GY | Performed by: EMERGENCY MEDICINE

## 2018-01-29 PROCEDURE — 99285 EMERGENCY DEPT VISIT HI MDM: CPT | Mod: 25

## 2018-01-29 PROCEDURE — 40000274 ZZH STATISTIC RCP CONSULT EA 30 MIN

## 2018-01-29 RX ORDER — OSELTAMIVIR PHOSPHATE 30 MG/1
30 CAPSULE ORAL DAILY
Status: DISCONTINUED | OUTPATIENT
Start: 2018-01-30 | End: 2018-01-31

## 2018-01-29 RX ORDER — ALBUTEROL SULFATE 0.83 MG/ML
1 SOLUTION RESPIRATORY (INHALATION) 2 TIMES DAILY
COMMUNITY
End: 2018-10-18

## 2018-01-29 RX ORDER — ONDANSETRON 4 MG/1
4 TABLET, ORALLY DISINTEGRATING ORAL EVERY 6 HOURS PRN
Status: DISCONTINUED | OUTPATIENT
Start: 2018-01-29 | End: 2018-02-03 | Stop reason: HOSPADM

## 2018-01-29 RX ORDER — ONDANSETRON 2 MG/ML
4 INJECTION INTRAMUSCULAR; INTRAVENOUS EVERY 6 HOURS PRN
Status: DISCONTINUED | OUTPATIENT
Start: 2018-01-29 | End: 2018-02-03 | Stop reason: HOSPADM

## 2018-01-29 RX ORDER — ARFORMOTEROL TARTRATE 15 UG/2ML
15 SOLUTION RESPIRATORY (INHALATION) EVERY 12 HOURS
Status: DISCONTINUED | OUTPATIENT
Start: 2018-01-29 | End: 2018-02-03 | Stop reason: HOSPADM

## 2018-01-29 RX ORDER — ATORVASTATIN CALCIUM 20 MG/1
20 TABLET, FILM COATED ORAL DAILY
Status: DISCONTINUED | OUTPATIENT
Start: 2018-01-30 | End: 2018-02-03 | Stop reason: HOSPADM

## 2018-01-29 RX ORDER — NALOXONE HYDROCHLORIDE 0.4 MG/ML
.1-.4 INJECTION, SOLUTION INTRAMUSCULAR; INTRAVENOUS; SUBCUTANEOUS
Status: DISCONTINUED | OUTPATIENT
Start: 2018-01-29 | End: 2018-02-03 | Stop reason: HOSPADM

## 2018-01-29 RX ORDER — OSELTAMIVIR PHOSPHATE 30 MG/1
30 CAPSULE ORAL ONCE
Status: COMPLETED | OUTPATIENT
Start: 2018-01-29 | End: 2018-01-29

## 2018-01-29 RX ORDER — PREDNISONE 20 MG/1
40 TABLET ORAL DAILY
Status: DISCONTINUED | OUTPATIENT
Start: 2018-01-29 | End: 2018-02-03 | Stop reason: HOSPADM

## 2018-01-29 RX ORDER — IPRATROPIUM BROMIDE AND ALBUTEROL SULFATE 2.5; .5 MG/3ML; MG/3ML
3 SOLUTION RESPIRATORY (INHALATION) ONCE
Status: COMPLETED | OUTPATIENT
Start: 2018-01-29 | End: 2018-01-29

## 2018-01-29 RX ORDER — IPRATROPIUM BROMIDE AND ALBUTEROL SULFATE 2.5; .5 MG/3ML; MG/3ML
3 SOLUTION RESPIRATORY (INHALATION)
Status: DISCONTINUED | OUTPATIENT
Start: 2018-01-29 | End: 2018-02-03 | Stop reason: HOSPADM

## 2018-01-29 RX ORDER — LEVOTHYROXINE SODIUM 75 UG/1
75 TABLET ORAL DAILY
Status: DISCONTINUED | OUTPATIENT
Start: 2018-01-30 | End: 2018-02-03 | Stop reason: HOSPADM

## 2018-01-29 RX ORDER — ALBUTEROL SULFATE 0.83 MG/ML
2.5 SOLUTION RESPIRATORY (INHALATION)
Status: DISCONTINUED | OUTPATIENT
Start: 2018-01-29 | End: 2018-02-03 | Stop reason: HOSPADM

## 2018-01-29 RX ORDER — ACETAMINOPHEN 325 MG/1
650 TABLET ORAL EVERY 4 HOURS PRN
Status: DISCONTINUED | OUTPATIENT
Start: 2018-01-29 | End: 2018-02-03 | Stop reason: HOSPADM

## 2018-01-29 RX ORDER — METOPROLOL SUCCINATE 25 MG/1
25 TABLET, EXTENDED RELEASE ORAL DAILY
Status: DISCONTINUED | OUTPATIENT
Start: 2018-01-30 | End: 2018-02-03 | Stop reason: HOSPADM

## 2018-01-29 RX ORDER — MULTIVITAMIN,THERAPEUTIC
1 TABLET ORAL DAILY
COMMUNITY
End: 2018-02-12 | Stop reason: ALTCHOICE

## 2018-01-29 RX ADMIN — IPRATROPIUM BROMIDE AND ALBUTEROL SULFATE 3 ML: .5; 3 SOLUTION RESPIRATORY (INHALATION) at 17:02

## 2018-01-29 RX ADMIN — IPRATROPIUM BROMIDE AND ALBUTEROL SULFATE 3 ML: .5; 3 SOLUTION RESPIRATORY (INHALATION) at 20:45

## 2018-01-29 RX ADMIN — PREDNISONE 40 MG: 20 TABLET ORAL at 21:00

## 2018-01-29 RX ADMIN — ARFORMOTEROL TARTRATE 15 MCG: 15 SOLUTION RESPIRATORY (INHALATION) at 20:45

## 2018-01-29 RX ADMIN — OSELTAMIVIR PHOSPHATE 30 MG: 30 CAPSULE ORAL at 17:14

## 2018-01-29 ASSESSMENT — ENCOUNTER SYMPTOMS
COUGH: 1
DIARRHEA: 0
NAUSEA: 0
VOMITING: 0
SHORTNESS OF BREATH: 1

## 2018-01-29 ASSESSMENT — ACTIVITIES OF DAILY LIVING (ADL): ADLS_ACUITY_SCORE: 11

## 2018-01-29 NOTE — ED NOTES
Pt's O2 was DC'd at approximately 1630 and her O2 sats were monitored. Pt's O2 sats did trend downward. Pt put back on low flow O2, will notify MD.  Pt states her O2 sats usually are 88-90%. Also, pt states she has O2 at home and uses it prn.

## 2018-01-29 NOTE — ED NOTES
Pt back from Imaging.She is  resting Comfortably on bed. Pleasant.  Alert & Oriented  Pt C/O annoying cough.

## 2018-01-29 NOTE — TELEPHONE ENCOUNTER
Pt calls, left vm 01/29 at 915AM. Reports URI symptoms.    Pt calls back at this time.  Marie Kline is a 85 year old female who calls with URI s/sx.    NURSING ASSESSMENT:  Description:  Pt reports she has an oxygen concentrator to use when she's sick. She was 77% on RA, started using 2L and is now up to 89%. Also has sore throat, productive cough with yellow mucus, rib pain from coughing, fever (yest 101.3F, today 100.1F)  Onset/duration:  Sat  Denies: body aches, HA, SOB (reports breathing is fine as long as she's on oxygen)    Improves/worsens symptoms:  Oxygen use  Last exam/Treatment:  10/05/17  Allergies:   Allergies   Allergen Reactions     Contrast Dye Hives       MEDICATIONS:   Taking over the counter medication(s?) Yes , mucinex and flonase  Medication(s) improving/managing symptoms?  somewhat     Pt reports she'd really not like to come to an appointment. Requesting rx. 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 Reports she was hospitalized for 10 days last year for similar symptoms that where worse than these. Hoping to take prednisone and antibx now to avoid progression of illness.    NURSING PLAN: Routed to provider Yes    If further questions/concerns or if symptoms do not improve, worsen or new symptoms develop, call your PCP or [facility] as soon as possible.      Guideline used:  Telephone Triage Protocols for Nurses, Fourth Edition, Sunita Cordova RN

## 2018-01-29 NOTE — IP AVS SNAPSHOT
MRN:[id number]                      After Visit Summary   1/29/2018    Marie Kline    MRN: [id number]           Thank you!     Thank you for choosing [facility] for your care. Our goal is always to provide you with excellent care. Hearing back from our patients is one way we can continue to improve our services. Please take a few minutes to complete the written survey that you may receive in the mail after you visit. If you would like to speak to someone directly about your visit please contact Patient Relations at [phone]. Thank you!          Patient Information     Date Of Birth          4/28/1932        Designated Caregiver       Most Recent Value    Caregiver    Will someone help with your care after discharge? yes    Name of designated caregiver Assisted Living nurse    Phone number of caregiver see chart    Caregiver address see chart      About your hospital stay     You were admitted on:  January 29, 2018 You last received care in the:  [address] [facility]    You were discharged on:  February 3, 2018        Reason for your hospital stay       COPD exacerbation secondary to influenza A                  Who to Call     For medical emergencies, please call 911.  For non-urgent questions about your medical care, please call your primary care provider or clinic, [phone]          Attending Provider     Provider Specialty    Jacinto Easton MD Emergency Medicine    [facility]       Primary Care Provider Office Phone # Fax #    Piper Catracho Kiser [phone]      After Care Instructions     Diet       Follow this diet upon discharge: Orders Placed This Encounter      Combination Diet Regular Diet Adult            Oxygen Adult       Renew Home Oxygen Order  Renew previous prescription.  Expected treatment length is indefinite (99 months).  Patient has a oxygen concentrator at home she should use 2 L/min as  needed    Attending Provider: Hai Jeffries  Physician signature: See electronic signature associated with these discharge orders  Date of Order: February 3, 2018                  Follow-up Appointments     Follow-up and recommended labs and tests        Follow-up with primary care physician next week                  Your next 10 appointments already scheduled     Mar 08, 2018  9:30 AM CST   Cystoscopy with Kar Nuñez MD, UB CYF   [facility] ([facility])    [address]  [address]  [facility] [address] [phone]              Additional Services     Home Care OT Referral for Hospital Discharge       OT to eval and treat    Your provider has ordered home care - occupational therapy. If you have not been contacted within 2 days of your discharge please call the department phone number listed on the top of this document.            Home Care PT Referral for Hospital Discharge       PT to eval and treat    Your provider has ordered home care - physical therapy. If you have not been contacted within 2 days of your discharge please call the department phone number listed on the top of this document.            Home care nursing referral       RN skilled nursing visit. RN to assess vital signs and weight and respiratory and cardiac status.    Your provider has ordered home care nursing services. If you have not been contacted within 2 days of your discharge please call the inpatient department phone number at [phone] .                  Pending Results     Date and Time Order Name Status Description    1/29/2018 1446 Blood culture Preliminary     1/29/2018 1443 Blood culture Preliminary             Statement of Approval     Ordered          02/03/18 1140  I have reviewed and agree with all the recommendations and orders detailed in this document.  EFFECTIVE NOW     Approved and electronically signed by:  Hai Jeffries,  MD             Admission Information     Date & Time Provider Department Dept. Phone    1/29/2018 Rangel Alvarez MD [place] [address] [facility] [phone]      Your Vitals Were     Blood Pressure Pulse Temperature Respirations Pulse Oximetry       139/71 (BP Location: Right arm) 66 96.5  F (35.8  C) (Oral) 20 92%       MyChart Information     Payment pluginhart gives you secure access to your electronic health record. If you see a primary care provider, you can also send messages to your care team and make appointments. If you have questions, please call your primary care clinic.  If you do not have a primary care provider, please call [phone] and they will assist you.        Care EveryWhere ID     This is your Care EveryWhere ID. This could be used by other organizations to access your [place] medical records  [id number]        Equal Access to Services     RONNA ROBBINS : Claudia Cast, ladonna clayton, eduard knight, zoe sykes. So [facility] [phone].    ATENCIÓN: Si habla español, tiene a eaton disposición servicios gratuitos de asistencia lingüística. [place] al [phone].    We comply with applicable federal civil rights laws and [place] laws. We do not discriminate on the basis of race, color, national origin, age, disability, sex, sexual orientation, or gender identity.               Review of your medicines      START taking        Dose / Directions    benzonatate 100 MG capsule   Commonly known as:  TESSALON   Used for:  Influenza A        Dose:  100 mg   Take 1 capsule (100 mg) by mouth 3 times daily as needed for cough   Quantity:  42 capsule   Refills:  0       doxycycline 100 MG tablet   Commonly known as:  VIBRA-TABS   Indication:  acute bronchitis        Dose:  100 mg   Take 1 tablet (100 mg) by mouth every 12 hours for 3 days   Quantity:  6 tablet   Refills:  0       oseltamivir 30 MG capsule   Commonly known as:  TAMIFLU   Indication:   Flu   Used for:  Influenza A        Dose:  30 mg   Take 1 capsule (30 mg) by mouth daily for 5 days   Quantity:  5 capsule   Refills:  0       predniSONE 20 MG tablet   Commonly known as:  DELTASONE        Dose:  20 mg   Start taking on:  2/4/2018   Take 1 tablet (20 mg) by mouth daily for 3 days   Quantity:  3 tablet   Refills:  0       traZODone 50 MG tablet   Commonly known as:  DESYREL   Used for:  Insomnia, unspecified type        Dose:  50 mg   Take 1 tablet (50 mg) by mouth nightly as needed for sleep   Quantity:  30 tablet   Refills:  0         CONTINUE these medicines which have NOT CHANGED        Dose / Directions    * albuterol (2.5 MG/3ML) 0.083% neb solution        Dose:  1 vial   Take 1 vial by nebulization 2 times daily   Refills:  0       * albuterol 108 (90 BASE) MCG/ACT Inhaler   Commonly known as:  PROAIR HFA/PROVENTIL HFA/VENTOLIN HFA   Used for:  Intermittent asthma without complication        Dose:  2 puff   Inhale 2 puffs into the lungs every 6 hours as needed for wheezing   Quantity:  1 Inhaler   Refills:  8       atorvastatin 20 MG tablet   Commonly known as:  LIPITOR   Used for:  Hyperlipidemia with target LDL less than 130        Dose:  20 mg   Take 1 tablet (20 mg) by mouth daily   Quantity:  30 tablet   Refills:  1       denosumab 60 MG/ML Soln injection   Commonly known as:  PROLIA        Dose:  60 mg   Inject 60 mg Subcutaneous every 6 months   Refills:  0       fluticasone-salmeterol 250-50 MCG/DOSE diskus inhaler   Commonly known as:  ADVAIR        Dose:  1 puff   Inhale 1 puff into the lungs 2 times daily   Refills:  0       levothyroxine 75 MCG tablet   Commonly known as:  SYNTHROID/LEVOTHROID   Used for:  Other specified hypothyroidism        Dose:  75 mcg   Take 1 tablet (75 mcg) by mouth daily   Quantity:  90 tablet   Refills:  2       metoprolol succinate 25 MG 24 hr tablet   Commonly known as:  TOPROL-XL   Used for:  ACS (acute coronary syndrome) (H)        Dose:  25 mg   Take  1 tablet (25 mg) by mouth daily   Quantity:  90 tablet   Refills:  2       multivitamin, therapeutic Tabs tablet        Dose:  1 tablet   Take 1 tablet by mouth daily   Refills:  0       vitamin D 2000 UNITS tablet   Used for:  Vitamin D deficiency        Dose:  2000 Units   Take 2,000 Units by mouth daily   Quantity:  100 tablet   Refills:  3       * Notice:  This list has 2 medication(s) that are the same as other medications prescribed for you. Read the directions carefully, and ask your doctor or other care provider to review them with you.      STOP taking     calcium carbonate 1250 MG tablet   Commonly known as:  OS-CARLOS 500 mg [place]. Ca                Where to get your medicines      These medications were sent to [facility] [address] [facility]  [address] [facility] [address]     Phone:  [phone]     benzonatate 100 MG capsule    doxycycline 100 MG tablet    oseltamivir 30 MG capsule    predniSONE 20 MG tablet    traZODone 50 MG tablet                Protect others around you: Learn how to safely use, store and throw away your medicines at www.disposemymeds.org.        ANTIBIOTIC INSTRUCTION     You've Been Prescribed an Antibiotic - Now What?  Your healthcare team thinks that you or your loved one might have an infection. Some infections can be treated with antibiotics, which are powerful, life-saving drugs. Like all medications, antibiotics have side effects and should only be used when necessary. There are some important things you should know about your antibiotic treatment.      Your healthcare team may run tests before you start taking an antibiotic.    Your team may take samples (e.g., from your blood, urine or other areas) to run tests to look for bacteria. These test can be important to determine if you need an antibiotic at all and, if you do, which antibiotic will work best.      Within a few days, your healthcare team might change or even stop  your antibiotic.    Your team may start you on an antibiotic while they are working to find out what is making you sick.    Your team might change your antibiotic because test results show that a different antibiotic would be better to treat your infection.    In some cases, once your team has more information, they learn that you do not need an antibiotic at all. They may find out that you don't have an infection, or that the antibiotic you're taking won't work against your infection. For example, an infection caused by a virus can't be treated with antibiotics. Staying on an antibiotic when you don't need it is more likely to be harmful than helpful.      You may experience side effects from your antibiotic.    Like all medications, antibiotics have side effects. Some of these can be serious.    Let you healthcare team know if you have any known allergies when you are admitted to the hospital.    One significant side effect of nearly all antibiotics is the risk of severe and sometimes deadly diarrhea caused by Clostridium difficile (C. Difficile). This occurs when a person takes antibiotics because some good germs are destroyed. Antibiotic use allows C. diificile to take over, putting patients at high risk for this serious infection.    As a patient or caregiver, it is important to understand your or your loved one's antibiotic treatment. It is especially important for caregivers to speak up when patients can't speak for themselves. Here are some important questions to ask your healthcare team.    What infection is this antibiotic treating and how do you know I have that infection?    What side effects might occur from this antibiotic?    How long will I need to take this antibiotic?    Is it safe to take this antibiotic with other medications or supplements (e.g., vitamins) that I am taking?     Are there any special directions I need to know about taking this antibiotic? For example, should I take it with  food?    How will I be monitored to know whether my infection is responding to the antibiotic?    What tests may help to make sure the right antibiotic is prescribed for me?      Information provided by:  www.cdc.gov/getsmart  U.S. Department of Health and Human Services  Centers for disease Control and Prevention  National Center for Emerging and Zoonotic Infectious Diseases  Division of Healthcare Quality Promotion             Medication List: This is a list of all your medications and when to take them. Check marks below indicate your daily home schedule. Keep this list as a reference.      Medications           Morning Afternoon Evening Bedtime As Needed    * albuterol (2.5 MG/3ML) 0.083% neb solution   Take 1 vial by nebulization 2 times daily                                * albuterol 108 (90 BASE) MCG/ACT Inhaler   Commonly known as:  PROAIR HFA/PROVENTIL HFA/VENTOLIN HFA   Inhale 2 puffs into the lungs every 6 hours as needed for wheezing                                atorvastatin 20 MG tablet   Commonly known as:  LIPITOR   Take 1 tablet (20 mg) by mouth daily   Last time this was given:  20 mg on 2/3/2018  9:32 AM   Next Dose Due:  Take tomorrow 2/4/2018 in the morning                                benzonatate 100 MG capsule   Commonly known as:  TESSALON   Take 1 capsule (100 mg) by mouth 3 times daily as needed for cough   Last time this was given:  100 mg on 1/31/2018 11:16 AM                                denosumab 60 MG/ML Soln injection   Commonly known as:  PROLIA   Inject 60 mg Subcutaneous every 6 months                                doxycycline 100 MG tablet   Commonly known as:  VIBRA-TABS   Take 1 tablet (100 mg) by mouth every 12 hours for 3 days   Last time this was given:  100 mg on 2/3/2018  9:32 AM   Next Dose Due:  Take tonight 2/3/2018 at bedtime                                fluticasone-salmeterol 250-50 MCG/DOSE diskus inhaler   Commonly known as:  ADVAIR   Inhale 1 puff into the  lungs 2 times daily                                levothyroxine 75 MCG tablet   Commonly known as:  SYNTHROID/LEVOTHROID   Take 1 tablet (75 mcg) by mouth daily   Last time this was given:  75 mcg on 2/3/2018  9:32 AM   Next Dose Due:  Take tomorrow 2/4/2018 in the morning                                  metoprolol succinate 25 MG 24 hr tablet   Commonly known as:  TOPROL-XL   Take 1 tablet (25 mg) by mouth daily   Last time this was given:  25 mg on 2/3/2018  9:32 AM   Next Dose Due:  Take tomorrow 2/4/2018 in the morning                                  multivitamin, therapeutic Tabs tablet   Take 1 tablet by mouth daily                                oseltamivir 30 MG capsule   Commonly known as:  TAMIFLU   Take 1 capsule (30 mg) by mouth daily for 5 days   Last time this was given:  30 mg on 2/3/2018  9:32 AM   Next Dose Due:  Take tomorrow 2/4/2018 in the morning                                  predniSONE 20 MG tablet   Commonly known as:  DELTASONE   Take 1 tablet (20 mg) by mouth daily for 3 days   Start taking on:  2/4/2018   Last time this was given:  40 mg on 2/3/2018  9:32 AM   Next Dose Due:  Take 20 mg 2/4/2018-2/6/2018 in the morning                                traZODone 50 MG tablet   Commonly known as:  DESYREL   Take 1 tablet (50 mg) by mouth nightly as needed for sleep   Last time this was given:  50 mg on 2/2/2018 10:18 PM                                vitamin D 2000 UNITS tablet   Take 2,000 Units by mouth daily                                * Notice:  This list has 2 medication(s) that are the same as other medications prescribed for you. Read the directions carefully, and ask your doctor or other care provider to review them with you.

## 2018-01-29 NOTE — IP AVS SNAPSHOT
[place] [address] [facility]    [address]    [facility] [address]    Phone:  [phone]    Fax:  [phone]                                       After Visit Summary   1/29/2018    Marie Kline    MRN: [id number]           After Visit Summary Signature Page     I have received my discharge instructions, and my questions have been answered. I have discussed any challenges I see with this plan with the nurse or doctor.    ..........................................................................................................................................  Patient/Patient Representative Signature      ..........................................................................................................................................  Patient Representative Print Name and Relationship to Patient    ..................................................               ................................................  Date                                            Time    ..........................................................................................................................................  Reviewed by Signature/Title    ...................................................              ..............................................  Date                                                            Time

## 2018-01-29 NOTE — ED PROVIDER NOTES
History     Chief Complaint:  Shortness of breath    HPI   Marie Kline is a 85 year old female with a history of COPD, CHF, Leukemia, and frequent pneumonia who presents with shortness of breath. The patient states that she was feeling fine two days ago Saturday during the day, and that evening she began feeling short of breath. She recorded O2 sats around 82-83 yesterday. Nebulizer treatments did not seem to help her symptoms and she continued to have increasing shortness of breath. This morning she called her clinic who recommended that she present to the emergency department. Of note the patient does receive chemotherapy once per month for her leukemia. She denies any vomiting or diarrhea. Of note the patient does live independently at a senior living facility where influenza is quite common at the moment.    Allergies:  Contrast dye    Medications:    Atorvastatin  Formoterol  Levothyroxine  Zyloprim  Albuterol  Lasix  Trazodone  Metoprolol  Ativan    Past Medical History:    Bladder cancer  Cardiomyopathy  CLL  CHF  Hypothyroidism  Mumps  MI  Pulmonary edema cardiac disease  Tricuspid valve disorders    Past Surgical History:    Bladder surgery  Coronary angiography  Cystoscopy  EGD    Family History:    Cerebrovascular disease  Cancer    Social History:  The patient was accompanied to the ED by her great grandson.  Smoking Status: Former  Smokeless Tobacco: No  Alcohol Use: No   Marital Status:   [5]    Review of Systems   Respiratory: Positive for cough and shortness of breath.    Gastrointestinal: Negative for diarrhea, nausea and vomiting.   All other systems reviewed and are negative.    Physical Exam   Vitals:  Patient Vitals for the past 24 hrs:   BP Temp Temp src Pulse Resp SpO2   01/29/18 1715 128/65 - - - - 93 %   01/29/18 1702 - - - - - 92 %   01/29/18 1700 116/64 - - - - 93 %   01/29/18 1645 127/82 - - - - 92 %   01/29/18 1625 119/59 - - - - 95 %   01/29/18 1438 - - - - - 96 %   01/29/18  1435 129/70 97.5  F (36.4  C) Oral 84 18 (!) 83 %      Physical Exam  General: Patient is alert and cooperative.  HENT:  Normal nose, oropharynx. Moist oral mucosa.  Eyes: EOMI. Normal conjunctiva.  Neck:  Normal range of motion and appearance.   Cardiovascular:  Normal rate, regular rhythm and normal heart sounds.   Pulmonary/Chest:  Frequent cough;  Distant breath sounds, no expiratory wheezing.  Speaking complete sentences.  Increased work of breathing.   Abdominal: Soft. No distension or tenderness.     Musculoskeletal: Normal range of motion. No edema or tenderness.   Neurological: oriented, normal strength, sensation, and coordination.   Skin: Warm and dry. No rash or bruising.   Psychiatric: Normal mood and affect. Normal behavior and judgement.    Emergency Department Course     Imaging:  Radiology findings were communicated with the patient who voiced understanding of the findings.  XR chest 2 views:  IMPRESSION: Emphysema with scattered interstitial prominence likely  due to slight scattered fibrosis. No new consolidative infiltrates or  other acute findings. Normal heart size. Scoliosis convex to the right  in the thoracic spine.  Reading per radiology.     Laboratory:  Laboratory findings were communicated with the patient who voiced understanding of the findings.  CBC: WBC: 45.4(H), RDW: 15.7(H), PLT: 95(L), o/w WNL  (HGB 13.6)   BMP: Glucose: 122(H), Creatinine: 1.06(H), GFR; 49(L), o/w WNL   Blood culture: pending  Blood culture: pending  Influenza A/B antigen: Influenza A positive    Interventions:  1702 Duoneb, 3 mL, nebulization    1714 Tamiflu 30 mg oral    Emergency Department Course:  Nursing notes and vitals reviewed.  I performed an exam of the patient as documented above.   IV was inserted and blood was drawn for laboratory testing, results above.     1728 I spoke with Dr. Alvarez regarding the patient    I discussed the treatment plan with the patient. They expressed understanding of this  plan and consented to admission. I discussed the patient with Dr. Alvarez, who will admit the patient to a monitored bed for further evaluation and treatment.     I personally reviewed the laboratory results with the patient and answered all related questions prior to admission.    Impression & Plan      Medical Decision Making:  Marie Kline is a 85 year old female with a history of COPD who presents to the emergency department today with an acute febrile illness with symptoms of fever, cough, and progressively worsening shortness of breath. O2 sats on arrival in triage were 83%. On exam, she is an alert 85 year old female, speaking in complete sentences, with increased respiratory effort. She has a frequent cough and slightly distant breath sounds, but no overt wheezing. Evaluation including a positive rapid influenza A. Chest x ray demonstrates no apparent infiltrate. She has emphysema with scattered fibrotic changes. She has had a number of respiratory related hospitalizations, including in March of 2017 with acute hypoxic repository failure, resulting in ICU admission on BiPAP for a total period of 10 days.I believe she is at high risk for deterioration and have recommended admission. She is being medicated with PO Tamiflu and administered a duonebilization.    Diagnosis:    ICD-10-CM    1. Influenza A J10.1         Disposition:   Admitted    CMS Diagnoses: None     Scribe Disclosure:  DEMARIO, Luis Mcgee, am serving as a scribe at 2:45 PM on 1/29/2018 to document services personally performed by Jacinto Easton MD, based on my observations and the provider's statements to me.   [facility] EMERGENCY DEPARTMENT       Jacinto Easton MD  01/29/18 1179

## 2018-01-29 NOTE — ED NOTES
[facility]  ED Nurse Handoff Report    Marie Kline is a 85 year old female   ED Chief complaint: Shortness of Breath  . ED Diagnosis:   Final diagnoses:   Influenza A     Allergies:   Allergies   Allergen Reactions     Contrast Dye Hives       Code Status: Full Code  Activity level - Baseline/Home:  Independent. Activity Level - Current:   Stand with Assist. Lift room needed: No. Bariatric: No   Needed: No   Isolation: Flu Precautions. Infection: Not Applicable.     Vital Signs:   Vitals:    01/29/18 1645 01/29/18 1700 01/29/18 1702 01/29/18 1715   BP: 127/82 116/64  128/65   Pulse:       Resp:       Temp:       TempSrc:       SpO2: 92% 93% 92% 93%       Cardiac Rhythm:  ,      Pain level: 0-10 Pain Scale: 5  Patient confused: No. Patient Falls Risk: Yes.   Elimination Status: Has not felt need to void yet.   Patient Report - Initial Complaint: SOB. Focused Assessment:  She is  resting Comfortably on bed. Pleasant.  Alert & Oriented . Pt C/O annoying cough.No other specific complaints.   Her coughing is frequent, sometimes productive.  Tests Performed: Lab & Imaging. Abnormal Results:   Labs Ordered and Resulted from Time of ED Arrival Up to the Time of Departure from the ED   CBC WITH PLATELETS DIFFERENTIAL - Abnormal; Notable for the following:        Result Value    WBC 45.4 (*)     RDW 15.7 (*)     Platelet Count 95 (*)     Absolute Lymphocytes 40.9 (*)     Absolute Monocytes 2.7 (*)     All other components within normal limits   BASIC METABOLIC PANEL - Abnormal; Notable for the following:     Glucose 122 (*)     Creatinine 1.06 (*)     GFR Estimate 49 (*)     GFR Estimate If Black 60 (*)     All other components within normal limits   INFLUENZA A/B ANTIGEN - Abnormal; Notable for the following:     Influenza A Positive (*)     All other components within normal limits   PERIPHERAL IV CATHETER   PULSE OXIMETRY NURSING   BLOOD CULTURE   BLOOD CULTURE     XR Chest 2 Views   Preliminary  Result   IMPRESSION: Emphysema with scattered interstitial prominence likely   due to slight scattered fibrosis. No new consolidative infiltrates or   other acute findings. Normal heart size. Scoliosis convex to the right   in the thoracic spine.        Treatments provided: O2,Neb,medication  Family Comments: present but going to leave.  OBS brochure/video discussed/provided to patient:  N/A  ED Medications:   Medications   oseltamivir (TAMIFLU) capsule 30 mg (30 mg Oral Given 1/29/18 1714)   ipratropium - albuterol 0.5 mg/2.5 mg/3 mL (DUONEB) neb solution 3 mL (3 mLs Nebulization Given 1/29/18 1702)     Drips infusing:  No  For the majority of the shift, the patient's behavior Green. Interventions performed were N/A.     Severe Sepsis OR Septic Shock Diagnosis Present: No      ED Nurse Name/Phone Number: Drew Gordon,   5:24 PM     RECEIVING UNIT ED HANDOFF REVIEW    Above ED Nurse Handoff Report was reviewed: Yes  Reviewed by: Vita Silver on January 29, 2018 at 6:33 PM

## 2018-01-29 NOTE — ED NOTES
Patient presents for complaint of a cough and shortness of breath since Sunday. States she has an extensive history of pneumonia. Productive cough with green sputum. ABC intact, A&Ox4.

## 2018-01-29 NOTE — TELEPHONE ENCOUNTER
Called pt, relay MD message below. States she's currently 89-90% without oxygen, pulse 79. She is very concerned about being exposed to influenza and other illnesses. States she slept with the oxygen on last night and then took it off this AM. Indicates her oxygen sat is 87% with talking at rest. Re-iterate recommendation of going to ER. States she will try to get her grandson to bring her. Discuss if unable to get ride soon to call 911 for ambulance. Verbalized understanding.

## 2018-01-30 LAB
ANION GAP SERPL CALCULATED.3IONS-SCNC: 4 MMOL/L (ref 3–14)
ANISOCYTOSIS BLD QL SMEAR: SLIGHT
BASOPHILS # BLD AUTO: 0 10E9/L (ref 0–0.2)
BASOPHILS NFR BLD AUTO: 0 %
BUN SERPL-MCNC: 15 MG/DL (ref 7–30)
BURR CELLS BLD QL SMEAR: SLIGHT
CALCIUM SERPL-MCNC: 8.8 MG/DL (ref 8.5–10.1)
CHLORIDE SERPL-SCNC: 98 MMOL/L (ref 94–109)
CO2 SERPL-SCNC: 31 MMOL/L (ref 20–32)
CREAT SERPL-MCNC: 0.89 MG/DL (ref 0.52–1.04)
DIFFERENTIAL METHOD BLD: ABNORMAL
ELLIPTOCYTES BLD QL SMEAR: SLIGHT
EOSINOPHIL # BLD AUTO: 0 10E9/L (ref 0–0.7)
EOSINOPHIL NFR BLD AUTO: 0 %
ERYTHROCYTE [DISTWIDTH] IN BLOOD BY AUTOMATED COUNT: 15.3 % (ref 10–15)
GFR SERPL CREATININE-BSD FRML MDRD: 60 ML/MIN/1.7M2
GLUCOSE SERPL-MCNC: 141 MG/DL (ref 70–99)
GRAM STN SPEC: NORMAL
HCT VFR BLD AUTO: 37.4 % (ref 35–47)
HGB BLD-MCNC: 12.2 G/DL (ref 11.7–15.7)
LYMPHOCYTES # BLD AUTO: 40.6 10E9/L (ref 0.8–5.3)
LYMPHOCYTES NFR BLD AUTO: 92 %
Lab: NORMAL
MCH RBC QN AUTO: 30 PG (ref 26.5–33)
MCHC RBC AUTO-ENTMCNC: 32.6 G/DL (ref 31.5–36.5)
MCV RBC AUTO: 92 FL (ref 78–100)
MONOCYTES # BLD AUTO: 0 10E9/L (ref 0–1.3)
MONOCYTES NFR BLD AUTO: 0 %
NEUTROPHILS # BLD AUTO: 3.5 10E9/L (ref 1.6–8.3)
NEUTROPHILS NFR BLD AUTO: 8 %
PLATELET # BLD AUTO: 90 10E9/L (ref 150–450)
PLATELET # BLD EST: ABNORMAL 10*3/UL
POTASSIUM SERPL-SCNC: 4.7 MMOL/L (ref 3.4–5.3)
RBC # BLD AUTO: 4.07 10E12/L (ref 3.8–5.2)
SODIUM SERPL-SCNC: 133 MMOL/L (ref 133–144)
SPECIMEN SOURCE: NORMAL
WBC # BLD AUTO: 44.1 10E9/L (ref 4–11)

## 2018-01-30 PROCEDURE — 12000000 ZZH R&B MED SURG/OB

## 2018-01-30 PROCEDURE — 85025 COMPLETE CBC W/AUTO DIFF WBC: CPT | Performed by: INTERNAL MEDICINE

## 2018-01-30 PROCEDURE — 25000125 ZZHC RX 250: Performed by: INTERNAL MEDICINE

## 2018-01-30 PROCEDURE — 80048 BASIC METABOLIC PNL TOTAL CA: CPT | Performed by: INTERNAL MEDICINE

## 2018-01-30 PROCEDURE — 87205 SMEAR GRAM STAIN: CPT | Performed by: INTERNAL MEDICINE

## 2018-01-30 PROCEDURE — A9270 NON-COVERED ITEM OR SERVICE: HCPCS | Mod: GY | Performed by: INTERNAL MEDICINE

## 2018-01-30 PROCEDURE — 94640 AIRWAY INHALATION TREATMENT: CPT | Mod: 76

## 2018-01-30 PROCEDURE — 25000132 ZZH RX MED GY IP 250 OP 250 PS 637: Mod: GY | Performed by: INTERNAL MEDICINE

## 2018-01-30 PROCEDURE — 99233 SBSQ HOSP IP/OBS HIGH 50: CPT | Performed by: INTERNAL MEDICINE

## 2018-01-30 PROCEDURE — 87070 CULTURE OTHR SPECIMN AEROBIC: CPT | Performed by: INTERNAL MEDICINE

## 2018-01-30 PROCEDURE — 94640 AIRWAY INHALATION TREATMENT: CPT

## 2018-01-30 PROCEDURE — 36415 COLL VENOUS BLD VENIPUNCTURE: CPT | Performed by: INTERNAL MEDICINE

## 2018-01-30 PROCEDURE — 40000275 ZZH STATISTIC RCP TIME EA 10 MIN

## 2018-01-30 RX ORDER — DOXYCYCLINE HYCLATE 100 MG
100 TABLET ORAL EVERY 12 HOURS SCHEDULED
Status: DISCONTINUED | OUTPATIENT
Start: 2018-01-30 | End: 2018-02-03 | Stop reason: HOSPADM

## 2018-01-30 RX ORDER — DOXYCYCLINE HYCLATE 50 MG/1
100 CAPSULE ORAL EVERY 12 HOURS SCHEDULED
Status: DISCONTINUED | OUTPATIENT
Start: 2018-01-30 | End: 2018-01-30 | Stop reason: CLARIF

## 2018-01-30 RX ADMIN — OSELTAMIVIR PHOSPHATE 30 MG: 30 CAPSULE ORAL at 16:16

## 2018-01-30 RX ADMIN — IPRATROPIUM BROMIDE AND ALBUTEROL SULFATE 3 ML: .5; 3 SOLUTION RESPIRATORY (INHALATION) at 11:58

## 2018-01-30 RX ADMIN — IPRATROPIUM BROMIDE AND ALBUTEROL SULFATE 3 ML: .5; 3 SOLUTION RESPIRATORY (INHALATION) at 15:22

## 2018-01-30 RX ADMIN — ARFORMOTEROL TARTRATE 15 MCG: 15 SOLUTION RESPIRATORY (INHALATION) at 20:15

## 2018-01-30 RX ADMIN — Medication 25 MG: at 23:04

## 2018-01-30 RX ADMIN — PREDNISONE 40 MG: 20 TABLET ORAL at 09:03

## 2018-01-30 RX ADMIN — LEVOTHYROXINE SODIUM 75 MCG: 75 TABLET ORAL at 09:03

## 2018-01-30 RX ADMIN — FLUTICASONE FUROATE AND VILANTEROL TRIFENATATE 1 PUFF: 200; 25 POWDER RESPIRATORY (INHALATION) at 07:47

## 2018-01-30 RX ADMIN — ATORVASTATIN CALCIUM 20 MG: 20 TABLET, FILM COATED ORAL at 09:03

## 2018-01-30 RX ADMIN — DOXYCYCLINE HYCLATE 100 MG: 100 TABLET, FILM COATED ORAL at 19:19

## 2018-01-30 RX ADMIN — IPRATROPIUM BROMIDE AND ALBUTEROL SULFATE 3 ML: .5; 3 SOLUTION RESPIRATORY (INHALATION) at 07:47

## 2018-01-30 RX ADMIN — Medication 25 MG: at 00:18

## 2018-01-30 RX ADMIN — METOPROLOL SUCCINATE 25 MG: 25 TABLET, EXTENDED RELEASE ORAL at 09:03

## 2018-01-30 RX ADMIN — IPRATROPIUM BROMIDE AND ALBUTEROL SULFATE 3 ML: .5; 3 SOLUTION RESPIRATORY (INHALATION) at 20:15

## 2018-01-30 RX ADMIN — ARFORMOTEROL TARTRATE 15 MCG: 15 SOLUTION RESPIRATORY (INHALATION) at 07:47

## 2018-01-30 RX ADMIN — DOXYCYCLINE HYCLATE 100 MG: 100 TABLET, FILM COATED ORAL at 12:10

## 2018-01-30 ASSESSMENT — ACTIVITIES OF DAILY LIVING (ADL)
ADLS_ACUITY_SCORE: 11

## 2018-01-30 NOTE — PHARMACY-ADMISSION MEDICATION HISTORY
Admission medication history interview status for this patient is complete. See [facility] admission navigator for allergy information, prior to admission medications and immunization status.     Medication history interview source(s):Patient  Medication history resources (including written lists, pill bottles, clinic record):Patient written list, [facility] pharmacy  Primary [facility]: [facility]    Changes made to PTA medication list:  Added: Advair, Multivite, Prolia  Deleted: Allopurinol, Ferrous Sulfate, Formeterol, Furosemide, Lorazepam, Trazodone  Changed: Albuterol Nebs to scheduled bid    Actions taken by pharmacist (provider contacted, etc):None     Additional medication history information:None    Medication reconciliation/reorder completed by provider prior to medication history? No    For patients on insulin therapy: No    Prior to Admission medications    Medication Sig Last Dose Taking? Auth Provider   albuterol (2.5 MG/3ML) 0.083% neb solution Take 1 vial by nebulization 2 times daily 1/29/2018 at am Yes Unknown, Entered By History   multivitamin, therapeutic (THERA-VIT) TABS tablet Take 1 tablet by mouth daily 1/29/2018 at Unknown time Yes Unknown, Entered By History   fluticasone-salmeterol (ADVAIR) 250-50 MCG/DOSE diskus inhaler Inhale 1 puff into the lungs 2 times daily 1/29/2018 at am Yes Unknown, Entered By History   atorvastatin (LIPITOR) 20 MG tablet Take 1 tablet (20 mg) by mouth daily 1/29/2018 at Unknown time Yes Piper Kiser MD   levothyroxine (SYNTHROID/LEVOTHROID) 75 MCG tablet Take 1 tablet (75 mcg) by mouth daily 1/29/2018 at Unknown time Yes Piper Kiser MD   metoprolol (TOPROL-XL) 25 MG 24 hr tablet Take 1 tablet (25 mg) by mouth daily 1/29/2018 at Unknown time Yes Piper Kiser MD   calcium carbonate (OS-CARLOS 500 MG [place]. [place]) 500 MG tablet Take 500 mg by mouth daily 1/29/2018 at Unknown time Yes Unknown, Entered By History   Cholecalciferol (VITAMIN D) 2000 UNITS tablet Take  2,000 Units by mouth daily 1/29/2018 at Unknown time Yes Piper Kiser MD   denosumab (PROLIA) 60 MG/ML SOLN injection Inject 60 mg Subcutaneous every 6 months More than a month at Unknown time  Unknown, Entered By History   albuterol (PROAIR HFA/PROVENTIL HFA/VENTOLIN HFA) 108 (90 BASE) MCG/ACT Inhaler Inhale 2 puffs into the lungs every 6 hours as needed for wheezing   Piper Kiser MD

## 2018-01-30 NOTE — CONSULTS
Care Transition Initial Assessment - RN  Reason For Consult: care coordination/care conference, discharge planning  Met with: Patient    Active Problems:    Hypoxia         DATA  Lives With: alone. [facility]  Living Arrangements: apartment     Who is your support system?: Children     Identified issues/concerns regarding health management: Pt is admitted with Influenza.  She doesn't have any service support at [facility].  She does have a life line.  Her children and grandchildren are supportive and involved with her care.  She does have home 02 that she uses [employer].  However, she doesn't remember the name of the company.  She has a concentrator and portability.           ASSESSMENT  Cognitive Status:  awake, alert and oriented  Concerns to be addressed: none .       PLAN  Patient given options and choices for discharge YES .  Patient/family is agreeable to the plan?  Yes:   Patient Goals and Preferences: YES .  Patient anticipates discharging to:  [facility] .    Yarely RN CTS [id number]

## 2018-01-30 NOTE — PLAN OF CARE
Problem: Patient Care Overview  Goal: Plan of Care/Patient Progress Review  Outcome: No Change  Pt remains hospitalized for Influenza A, SOB with exertion, vital signs stable, denies any pain, currently on 2L NC, no home O2. Up with SBA, calls approp, alarms on for safety, voiding without difficulty. Continues Tamiflu, started oral Prednisone. Trazodone @ HS. Will continue to monitor.

## 2018-01-30 NOTE — PLAN OF CARE
Problem: Infection, Risk/Actual (Adult)  Goal: Identify Related Risk Factors and Signs and Symptoms  Related risk factors and signs and symptoms are identified upon initiation of Human Response Clinical Practice Guideline (CPG).   Outcome: Improving  Pt continues to be hospitalized for influenza A. On 3L O2, LS diminished. Frequent, productive cough, sputum green in color. Tx; PO prednisone, tamiflu, scheduled nebs. Started on doxycycline. WBC 44.1.  Regular diet, good appetite. Up independently. Discharge possible back to [facility] tomorrow.

## 2018-01-30 NOTE — PROGRESS NOTES
"SPIRITUAL HEALTH SERVICES  SPIRITUAL ASSESSMENT Progress Note  [facility] Med. Surg. 5    PRIMARY FOCUS:     Goals of care    Symptom/pain management    Emotional/spiritual/Oriental orthodox distress    Support for coping    ILLNESS CIRCUMSTANCES:   Reviewed documentation. Reflective conversation shared with pt Marie which integrated elements of illness and family narratives.     Context of Serious Illness/Symptom(s) - Marie reported that she is being treated for influenza and narrated that she likely caught it when she went to a crowded restaurant.      Persons/Resources Involved - Marie named her daughter Margaret, son-in-law, great grandson Otto, her dog Мария, and her neighbors at [facility].    DISTRESS:     Emotional/Existential/Relational Distress -   o Marie acknowledged that it is hard for her \"to surrender\" and accept that she needs medical attention because she likes to stay active.  o She shared that she has two sons. One lives in [place] and struggles with alcoholism, and the other lives on the [facility] and has just returned to work after recovering from surgery for renal cancer.    Spiritual/Voodoo Distress - none expressed.    Social/Cultural/Economic Distress - none identified.    SPIRIT (Coping):     Shinto/Caty - Anabaptist; she attends Mormon services at [facility].    Spiritual Practice(s) - Marie is reading a book on living with purpose as one ages, she welcomed prayer    Emotional/Existential/Relational Connections - Marie loves to stay engaged with activities at [facility] and to do things with her family.  Otto is taking care of her dog Difrannieo.  Her daughter and son-in-law are vacationing in [place] at this time.    SENSE-MAKING:    Goals of Care - Marie is not sure how long she will be admitted but will continue with her flu regimen.  Reviewed how she can request another  visit.    Meaning/Sense-Making - Marie shared stories about he first  Lon of 35 years, about meeting her second  " Corky, and about her nine years with Corky until he  of cancer in .  Mraie reflected that reinier, family and [employer]ybeterian support, and being grateful helped her through the loss of her second .    PLAN: No further plans; I and other chaplains remain available per pt/family request.    Pelon Zamarripa M.Div., [facility]  Staff   Pager [phone]

## 2018-01-30 NOTE — PLAN OF CARE
Problem: Patient Care Overview  Goal: Plan of Care/Patient Progress Review  Outcome: No Change  Pt admitted with influenza A, SOB. On 2 L oxygen, states SOB is improved. Continue nebs.  Lungs diminished, coarse.  Has coarse, productive cough.  Denies pain or nausea. Stand by assist with ambulation, has some generalized weakness. Started on prednisone.

## 2018-01-30 NOTE — PROVIDER NOTIFICATION
Dr. Cloud Text Paged:  Pts sputum came back growing Mixed gram negative and positive yovanny. On doxycycline and Tamiflu.

## 2018-01-30 NOTE — PROGRESS NOTES
"[facility] RCAT     Date: 1/29/18    Admission Dx: Hypoxia, Flu +    Pulmonary History COPD, CHF    Home Nebulizer/MDI Use: Advair, Albuterol Neb, Albuterol MDI    Home Oxygen: prn    Acuity Level (RCAT flow sheet): 3    Aerosol Therapy initiated: Duoneb QID, Albuterol Q2 prn        Pulmonary Hygiene initiated: Deep breathe and Cough      Volume Expansion initiated: IS      Current Oxygen Requirements: 2L NC    Current SpO2: 92%    Re-evaluation date: 2-1-18    Patient Education: educated patient on bronchodilators      See \"RT Assessments\" flow sheet for patient assessment scoring and Acuity Level Details.             "

## 2018-01-30 NOTE — PROGRESS NOTES
Infection Prevention:    Patient requires Droplet precautions because of Influenza. Please contact Infection Prevention with any questions/concerns at *[phone].    Margret Segura, ICP

## 2018-01-30 NOTE — PROGRESS NOTES
[facility]    Hospitalist Progress Note    Date of Service (when I saw the patient): 01/30/2018    Assessment & Plan   85-year-old white female with a history of chronic lymphocytic leukemia, stress induced cardiomyopathy, bladder cancer, COPD on oxygen p.r.n. she resides at [facility] and had heart contact with patients who have had influenza infection. She has been on IVIG for her CLL. Patient was admitted for shortness of breath and cough as well as fever of 101.  She was positive for influenza A.  She was started on DuoNeb's, Tamiflu, oral prednisone.  She was admitted for further management.    1.  Acute hypoxic respiratory failure likely secondary to influenza A and COPD exacerbation: Will admit her as an inpatient.  We will place her on oral steroids, DuoNebs, Tamiflu.  She does have cough with greenish sputum.  Will add oral doxycycline today.    2.  History of chronic lymphocytic leukemia:  Monitor.     3.  Chronic kidney disease, stage 3:  Monitor.     4.  Hypothyroidism:  Continue with Synthroid.     5.  Coronary artery disease with prior history of stress induced cardiomyopathy:   Continue PTA medications      CODE STATUS:  FULL CODE.        DVT Prophylaxis: Pneumatic Compression Devices    Code Status: Full Code    Disposition: Expected discharge in 1-2 days if she continues to improve    Janine Cloud MD    Interval History   Patient claims that her breathing is better today.  She still has cough with greenish sputum.  She denies fever.  She has no nausea or vomiting.    -Data reviewed today: I reviewed all new labs and imaging results over the last 24 hours. I personally reviewed    Physical Exam   Temp: 97  F (36.1  C) Temp src: Oral BP: 126/51 Pulse: 79 Heart Rate: 68 Resp: 20 SpO2: 91 % O2 Device: Nasal cannula Oxygen Delivery: 3 LPM  There were no vitals filed for this visit.  Vital Signs with Ranges  Temp:  [96  F (35.6  C)-98.4  F (36.9  C)] 97  F (36.1  C)  Pulse:  [71-84]  79  Heart Rate:  [68-96] 68  Resp:  [18-22] 20  BP: (104-129)/(49-82) 126/51  SpO2:  [83 %-96 %] 91 %  I/O last 3 completed shifts:  In: 200 [P.O.:200]  Out: 525 [Urine:525]    GEN:  Alert, oriented x 3, appears comfortable, NAD.  HEENT:  Normocephalic/atraumatic, no scleral icterus, no nasal discharge, mouth moist.  CV:  Regular rate and rhythm, no murmur or JVD.  S1 + S2 noted, no S3 or S4.  LUNGS:  Clear to auscultation bilaterally without rales/rhonchi/wheezing/retractions.  Symmetric chest rise on inhalation noted.  ABD:  Active bowel sounds, soft, non-tender/non-distended.  No rebound/guarding/rigidity.  EXT:  No edema or cyanosis.  Hands/feet warm to touch with good signs of peripheral perfusion.  No joint synovitis noted.  SKIN:  Dry to touch, no exanthems noted in the visualized areas.  NEURO:  Symmetric muscle strength, sensation to touch grossly intact.  No new focal deficits appreciated.    Medications       arformoterol  15 mcg Nebulization Q12H     levothyroxine  75 mcg Oral Daily     atorvastatin  20 mg Oral Daily     oseltamivir  30 mg Oral Daily     predniSONE  40 mg Oral Daily     ipratropium - albuterol 0.5 mg/2.5 mg/3 mL  3 mL Nebulization 4x daily     fluticasone-vilanterol  1 puff Inhalation Daily     metoprolol succinate  25 mg Oral Daily       Data     Recent Labs  Lab 01/30/18  0743 01/29/18  1442   WBC 44.1* 45.4*   HGB 12.2 13.6   MCV 92 92   PLT 90* 95*    133   POTASSIUM 4.7 4.1   CHLORIDE 98 96   CO2 31 30   BUN 15 14   CR 0.89 1.06*   ANIONGAP 4 7   CARLOS 8.8 8.9   * 122*       Recent Results (from the past 24 hour(s))   XR Chest 2 Views    Narrative    CHEST TWO VIEWS   1/29/2018 4:03 PM     INDICATION: Shortness of breath.    COMPARISON: 8/9/2017.      Impression    IMPRESSION: Emphysema with scattered interstitial prominence likely  due to slight scattered fibrosis. No new consolidative infiltrates or  other acute findings. Normal heart size. Scoliosis convex to the  right  in the thoracic spine.    SIM CASE MD

## 2018-01-30 NOTE — H&P
Admitted:     01/29/2018      CHIEF COMPLAINT:  Shortness of breath, cough, fever.      HISTORY OF PRESENT ILLNESS:  This is an 85-year-old white female with a history of chronic lymphocytic leukemia, stress induced cardiomyopathy, bladder cancer, COPD on oxygen p.r.n.  She has been on IVIG for her CLL.  The patient resides at [facility] and has been in contact with a fair number of people who have had influenza over there.  She also went to this restaurant, [employer] on Saturday and then feels she got sick thereafter.  She complains of being more short of breath.  She has been having a cough which is productive with yellowish phlegm.  She has been having a fever as high as 101 since then, along with a sore throat.  In the ER over here, she was seen by Dr. Easton, whom I discussed care with.  She was noted to have positive influenza, started on Tamiflu and I am asked to admit her for further evaluation.      PAST MEDICAL HISTORY:  Significant for stress induced cardiomyopathy, COPD, anxiety, depression.      SOCIAL HISTORY:  She lives in senior living.  She does not smoke or drink alcohol.      PAST SURGICAL HISTORY:  Significant for bladder biopsy with cystoscopy, bladder surgery.      FAMILY HISTORY:  Significant for stroke in her mother.        SOCIAL HISTORY:  She lives alone.  She does not smoke or drink alcohol.      ALLERGIES:  SHE IS ALLERGIC TO CONTRAST.       MEDICATIONS:  Her home medications include Lipitor, iron, preformist, Synthroid, allopurinol, albuterol, Lasix, metoprolol, trazodone, lorazepam, vitamin D.      REVIEW OF SYSTEMS:  As mentioned in the HPI, she denies any myalgias or arthralgias.  She denies any nausea, vomiting, abdominal pain or diarrhea.  Her last IVIG infusion was 2 weeks ago.  At present she is not on any treatment for her bladder cancer.  Otherwise all other systems extensively reviewed and deemed unremarkable and negative.      PHYSICAL EXAMINATION:   VITAL SIGNS:   Temperature is 97.5, pulse 84, blood pressure is 128/65, respiratory rate 18, O2 sat is 93% on 2 liters.   GENERAL:  The patient is alert, awake, oriented, coherent, in no acute distress.   HEENT:  Pupils equal, round, react to light.  Pharynx, there is no exudate noted.   LUNGS:  She has diminished breath sounds with some mild expiratory wheezing.   HEART:  Has distant heart sounds, regular rate, S1, S2 normal.  No murmurs or gallops.   ABDOMEN:  Soft, nontender, with good bowel sounds.   EXTREMITIES:  There is no edema.      LABORATORY DATA:  Labwork obtained today included a basic metabolic panel which is grossly unremarkable other than a creatinine of 1.06 and a GFR of 49.  On a CBC with diff, her white cell count is 45.4, her platelet count is 95 with an absolute lymphocyte count of 40.9.  Blood cultures obtained in the ER are pending.  Chest x-ray, 2 views, carried out in the ER showed no consolidative infiltrates or other acute findings, normal heart size.  Influenza screen was positive for influenza A.      ASSESSMENT AND PLAN:   1.  Acute hypoxic respiratory failure likely secondary to influenza A and COPD exacerbation: Will admit her as an inpatient.  We will place her on oral steroids, DuoNebs, Tamiflu.  Will send off her sputum for studies.  We will check a procalcitonin.   2.  History of chronic lymphocytic leukemia:  Monitor.   3.  Chronic kidney disease, stage 3:  Monitor.   4.  Hypothyroidism:  Continue with Synthroid.   5.  Coronary artery disease with prior history of stress induced cardiomyopathy:  Resume home meds once reconciled.      CODE STATUS:  FULL CODE.        She will be admitted as an inpatient.         CYNTHIA BEARD MD             D: 2018   T: 2018   MT: FRANCO      Name:     MAGGI RODRIGUEZ   MRN:      [id number]        Account:      [id number]   :      1932        Admitted:     2018                   Document: [id number]

## 2018-01-31 LAB — LACTATE BLD-SCNC: 1.3 MMOL/L (ref 0.7–2)

## 2018-01-31 PROCEDURE — 25000132 ZZH RX MED GY IP 250 OP 250 PS 637: Mod: GY | Performed by: INTERNAL MEDICINE

## 2018-01-31 PROCEDURE — 25000125 ZZHC RX 250: Performed by: INTERNAL MEDICINE

## 2018-01-31 PROCEDURE — 40000275 ZZH STATISTIC RCP TIME EA 10 MIN

## 2018-01-31 PROCEDURE — 83605 ASSAY OF LACTIC ACID: CPT | Performed by: INTERNAL MEDICINE

## 2018-01-31 PROCEDURE — 94640 AIRWAY INHALATION TREATMENT: CPT

## 2018-01-31 PROCEDURE — 12000000 ZZH R&B MED SURG/OB

## 2018-01-31 PROCEDURE — A9270 NON-COVERED ITEM OR SERVICE: HCPCS | Mod: GY | Performed by: INTERNAL MEDICINE

## 2018-01-31 PROCEDURE — 99233 SBSQ HOSP IP/OBS HIGH 50: CPT | Performed by: INTERNAL MEDICINE

## 2018-01-31 PROCEDURE — 94640 AIRWAY INHALATION TREATMENT: CPT | Mod: 76

## 2018-01-31 PROCEDURE — 36415 COLL VENOUS BLD VENIPUNCTURE: CPT | Performed by: INTERNAL MEDICINE

## 2018-01-31 RX ORDER — OSELTAMIVIR PHOSPHATE 30 MG/1
30 CAPSULE ORAL 2 TIMES DAILY
Status: DISCONTINUED | OUTPATIENT
Start: 2018-01-31 | End: 2018-02-03 | Stop reason: HOSPADM

## 2018-01-31 RX ORDER — BENZONATATE 100 MG/1
100 CAPSULE ORAL 3 TIMES DAILY PRN
Status: DISCONTINUED | OUTPATIENT
Start: 2018-01-31 | End: 2018-02-03 | Stop reason: HOSPADM

## 2018-01-31 RX ADMIN — PREDNISONE 40 MG: 20 TABLET ORAL at 08:39

## 2018-01-31 RX ADMIN — DOXYCYCLINE HYCLATE 100 MG: 100 TABLET, FILM COATED ORAL at 08:40

## 2018-01-31 RX ADMIN — METOPROLOL SUCCINATE 25 MG: 25 TABLET, EXTENDED RELEASE ORAL at 08:39

## 2018-01-31 RX ADMIN — ARFORMOTEROL TARTRATE 15 MCG: 15 SOLUTION RESPIRATORY (INHALATION) at 19:28

## 2018-01-31 RX ADMIN — Medication 25 MG: at 22:25

## 2018-01-31 RX ADMIN — ATORVASTATIN CALCIUM 20 MG: 20 TABLET, FILM COATED ORAL at 08:39

## 2018-01-31 RX ADMIN — IPRATROPIUM BROMIDE AND ALBUTEROL SULFATE 3 ML: .5; 3 SOLUTION RESPIRATORY (INHALATION) at 11:39

## 2018-01-31 RX ADMIN — IPRATROPIUM BROMIDE AND ALBUTEROL SULFATE 3 ML: .5; 3 SOLUTION RESPIRATORY (INHALATION) at 16:01

## 2018-01-31 RX ADMIN — Medication 1 MG: at 01:13

## 2018-01-31 RX ADMIN — DOXYCYCLINE HYCLATE 100 MG: 100 TABLET, FILM COATED ORAL at 20:26

## 2018-01-31 RX ADMIN — IPRATROPIUM BROMIDE AND ALBUTEROL SULFATE 3 ML: .5; 3 SOLUTION RESPIRATORY (INHALATION) at 19:28

## 2018-01-31 RX ADMIN — OSELTAMIVIR PHOSPHATE 30 MG: 30 CAPSULE ORAL at 08:39

## 2018-01-31 RX ADMIN — OSELTAMIVIR PHOSPHATE 30 MG: 30 CAPSULE ORAL at 20:26

## 2018-01-31 RX ADMIN — BENZONATATE 100 MG: 100 CAPSULE ORAL at 11:16

## 2018-01-31 RX ADMIN — FLUTICASONE FUROATE AND VILANTEROL TRIFENATATE 1 PUFF: 200; 25 POWDER RESPIRATORY (INHALATION) at 07:59

## 2018-01-31 RX ADMIN — LEVOTHYROXINE SODIUM 75 MCG: 75 TABLET ORAL at 08:39

## 2018-01-31 RX ADMIN — IPRATROPIUM BROMIDE AND ALBUTEROL SULFATE 3 ML: .5; 3 SOLUTION RESPIRATORY (INHALATION) at 07:59

## 2018-01-31 RX ADMIN — ARFORMOTEROL TARTRATE 15 MCG: 15 SOLUTION RESPIRATORY (INHALATION) at 07:59

## 2018-01-31 ASSESSMENT — ACTIVITIES OF DAILY LIVING (ADL)
ADLS_ACUITY_SCORE: 11

## 2018-01-31 ASSESSMENT — PAIN DESCRIPTION - DESCRIPTORS: DESCRIPTORS: ACHING;TENDER

## 2018-01-31 NOTE — PROGRESS NOTES
[facility]    Hospitalist Progress Note    Date of Service (when I saw the patient): 01/31/2018    Assessment & Plan   85-year-old white female with a history of chronic lymphocytic leukemia, stress induced cardiomyopathy, bladder cancer, COPD on oxygen p.r.n. she resides at [facility] and had heart contact with patients who have had influenza infection. She has been on IVIG for her CLL. Patient was admitted for shortness of breath and cough as well as fever of 101.  She was positive for influenza A.  She was started on DuoNeb's, Tamiflu, oral prednisone.  She was admitted for further management.    1.  Acute hypoxic respiratory failure likely secondary to influenza A and COPD exacerbation: Continue oral steroids, DuoNebs, Tamiflu.  She does have cough with greenish sputum.  Will continue oral doxycycline.  Sputum culture pending.  Will order Tessalon for cough.    2.  History of chronic lymphocytic leukemia: White blood cell count is 44.1 today. Monitor.     3.  Chronic kidney disease, stage 3:  Monitor.     4.  Hypothyroidism:  Continue with Synthroid.     5.  Coronary artery disease with prior history of stress induced cardiomyopathy:   Continue PTA medications      CODE STATUS:  FULL CODE.        DVT Prophylaxis: Pneumatic Compression Devices    Code Status: Full Code    Disposition: Expected discharge in 1-2 days if she continues to improve    Janine Cloud MD    Interval History   Patient stated that she still having intermittent cough with greenish sputum.  She denies fever.  Has some wheezes.      -Data reviewed today: I reviewed all new labs and imaging results over the last 24 hours. I personally reviewed    Physical Exam   Temp: 96.5  F (35.8  C) Temp src: Oral BP: 114/58   Heart Rate: 71 Resp: 20 SpO2: 94 % O2 Device: Nasal cannula with humidification Oxygen Delivery: 3 LPM  Vitals:     Vital Signs with Ranges  Temp:  [96.5  F (35.8  C)-97.6  F (36.4  C)] 96.5  F (35.8  C)  Heart Rate:   [] 71  Resp:  [18-20] 20  BP: (108-133)/(50-60) 114/58  SpO2:  [90 %-96 %] 94 %  I/O last 3 completed shifts:  In: 630 [P.O.:630]  Out: 1000 [Urine:1000]    GEN:  Alert, oriented x 3, appears comfortable, NAD.  HEENT:  Normocephalic/atraumatic, no scleral icterus, no nasal discharge, mouth moist.  CV:  Regular rate and rhythm, no murmur or JVD.  S1 + S2 noted, no S3 or S4.  LUNGS:  Clear to auscultation bilaterally without rales/rhonchi/wheezing/retractions.  Symmetric chest rise on inhalation noted.  ABD:  Active bowel sounds, soft, non-tender/non-distended.  No rebound/guarding/rigidity.  EXT:  No edema or cyanosis.  Hands/feet warm to touch with good signs of peripheral perfusion.  No joint synovitis noted.  SKIN:  Dry to touch, no exanthems noted in the visualized areas.  NEURO:  Symmetric muscle strength, sensation to touch grossly intact.  No new focal deficits appreciated.    Medications       oseltamivir  30 mg Oral BID     doxycycline  100 mg Oral Q12H ANA     arformoterol  15 mcg Nebulization Q12H     levothyroxine  75 mcg Oral Daily     atorvastatin  20 mg Oral Daily     predniSONE  40 mg Oral Daily     ipratropium - albuterol 0.5 mg/2.5 mg/3 mL  3 mL Nebulization 4x daily     fluticasone-vilanterol  1 puff Inhalation Daily     metoprolol succinate  25 mg Oral Daily       Data     Recent Labs  Lab 01/30/18  0743 01/29/18  1442   WBC 44.1* 45.4*   HGB 12.2 13.6   MCV 92 92   PLT 90* 95*    133   POTASSIUM 4.7 4.1   CHLORIDE 98 96   CO2 31 30   BUN 15 14   CR 0.89 1.06*   ANIONGAP 4 7   CARLOS 8.8 8.9   * 122*       No results found for this or any previous visit (from the past 24 hour(s)).

## 2018-01-31 NOTE — PLAN OF CARE
Problem: Infection, Risk/Actual (Adult)  Goal: Identify Related Risk Factors and Signs and Symptoms  Related risk factors and signs and symptoms are identified upon initiation of Human Response Clinical Practice Guideline (CPG).   Outcome: Improving  Pt remains hospitalized for influenza A, on 2.5L O2, Tamiflu, Doxycyline, and scheduled nebs.  Pt coughing up green thick sputum. Getting up independently, Good appetite on regular diet. Pt received 1 dose prn tessalon for cough.

## 2018-01-31 NOTE — PLAN OF CARE
Problem: Patient Care Overview  Goal: Plan of Care/Patient Progress Review  Outcome: No Change  Pt remains hospitalized for influenza A, on 3 L O2, Tamiflu, Doxycyline, and scheduled nebs. Getting up independently, Good appetite on regular diet. Requested trazodone for sleep.will continue to monitor

## 2018-01-31 NOTE — PLAN OF CARE
Problem: Infection, Risk/Actual (Adult)  Goal: Identify Related Risk Factors and Signs and Symptoms  Related risk factors and signs and symptoms are identified upon initiation of Human Response Clinical Practice Guideline (CPG).   Outcome: Improving  VSS. Afebrile. Requiring 3 L oxygen in mid 90s. LS diminished throughout, with expiratory wheezing noted on (R) and coarse in (R). BS hypoactive. Pt denies pain and nausea. Regular diet. Independently ambulating. PIV - SL. Getting Tamiflu and Prednisone. Productive and frequent cough. Droplet precautions in place. BC pending. DC 1-2 days.

## 2018-02-01 LAB
ANION GAP SERPL CALCULATED.3IONS-SCNC: 2 MMOL/L (ref 3–14)
ANISOCYTOSIS BLD QL SMEAR: SLIGHT
BACTERIA SPEC CULT: NORMAL
BASOPHILS # BLD AUTO: 0 10E9/L (ref 0–0.2)
BASOPHILS NFR BLD AUTO: 0 %
BUN SERPL-MCNC: 15 MG/DL (ref 7–30)
CALCIUM SERPL-MCNC: 8.7 MG/DL (ref 8.5–10.1)
CHLORIDE SERPL-SCNC: 100 MMOL/L (ref 94–109)
CO2 SERPL-SCNC: 32 MMOL/L (ref 20–32)
CREAT SERPL-MCNC: 0.78 MG/DL (ref 0.52–1.04)
DIFFERENTIAL METHOD BLD: ABNORMAL
ELLIPTOCYTES BLD QL SMEAR: SLIGHT
EOSINOPHIL # BLD AUTO: 0 10E9/L (ref 0–0.7)
EOSINOPHIL NFR BLD AUTO: 0 %
ERYTHROCYTE [DISTWIDTH] IN BLOOD BY AUTOMATED COUNT: 15.3 % (ref 10–15)
GFR SERPL CREATININE-BSD FRML MDRD: 70 ML/MIN/1.7M2
GLUCOSE SERPL-MCNC: 94 MG/DL (ref 70–99)
HCT VFR BLD AUTO: 33.8 % (ref 35–47)
HGB BLD-MCNC: 11 G/DL (ref 11.7–15.7)
LYMPHOCYTES # BLD AUTO: 38.8 10E9/L (ref 0.8–5.3)
LYMPHOCYTES NFR BLD AUTO: 98 %
MCH RBC QN AUTO: 29.9 PG (ref 26.5–33)
MCHC RBC AUTO-ENTMCNC: 32.5 G/DL (ref 31.5–36.5)
MCV RBC AUTO: 92 FL (ref 78–100)
MONOCYTES # BLD AUTO: 0 10E9/L (ref 0–1.3)
MONOCYTES NFR BLD AUTO: 0 %
NEUTROPHILS # BLD AUTO: 0.8 10E9/L (ref 1.6–8.3)
NEUTROPHILS NFR BLD AUTO: 2 %
PLATELET # BLD AUTO: 95 10E9/L (ref 150–450)
PLATELET # BLD EST: ABNORMAL 10*3/UL
POIKILOCYTOSIS BLD QL SMEAR: SLIGHT
POTASSIUM SERPL-SCNC: 4.5 MMOL/L (ref 3.4–5.3)
RBC # BLD AUTO: 3.68 10E12/L (ref 3.8–5.2)
SODIUM SERPL-SCNC: 134 MMOL/L (ref 133–144)
SPECIMEN SOURCE: NORMAL
WBC # BLD AUTO: 39.6 10E9/L (ref 4–11)

## 2018-02-01 PROCEDURE — 85025 COMPLETE CBC W/AUTO DIFF WBC: CPT | Performed by: INTERNAL MEDICINE

## 2018-02-01 PROCEDURE — 25000132 ZZH RX MED GY IP 250 OP 250 PS 637: Mod: GY | Performed by: INTERNAL MEDICINE

## 2018-02-01 PROCEDURE — 36415 COLL VENOUS BLD VENIPUNCTURE: CPT | Performed by: INTERNAL MEDICINE

## 2018-02-01 PROCEDURE — 25000125 ZZHC RX 250: Performed by: INTERNAL MEDICINE

## 2018-02-01 PROCEDURE — 94640 AIRWAY INHALATION TREATMENT: CPT

## 2018-02-01 PROCEDURE — 12000000 ZZH R&B MED SURG/OB

## 2018-02-01 PROCEDURE — 40000275 ZZH STATISTIC RCP TIME EA 10 MIN

## 2018-02-01 PROCEDURE — A9270 NON-COVERED ITEM OR SERVICE: HCPCS | Mod: GY | Performed by: INTERNAL MEDICINE

## 2018-02-01 PROCEDURE — 40000274 ZZH STATISTIC RCP CONSULT EA 30 MIN

## 2018-02-01 PROCEDURE — 99233 SBSQ HOSP IP/OBS HIGH 50: CPT | Performed by: INTERNAL MEDICINE

## 2018-02-01 PROCEDURE — 94640 AIRWAY INHALATION TREATMENT: CPT | Mod: 76

## 2018-02-01 PROCEDURE — 80048 BASIC METABOLIC PNL TOTAL CA: CPT | Performed by: INTERNAL MEDICINE

## 2018-02-01 RX ORDER — TRAZODONE HYDROCHLORIDE 50 MG/1
50 TABLET, FILM COATED ORAL
Status: DISCONTINUED | OUTPATIENT
Start: 2018-02-01 | End: 2018-02-03 | Stop reason: HOSPADM

## 2018-02-01 RX ORDER — SENNOSIDES 8.6 MG
8.6 TABLET ORAL 2 TIMES DAILY PRN
Status: DISCONTINUED | OUTPATIENT
Start: 2018-02-01 | End: 2018-02-03 | Stop reason: HOSPADM

## 2018-02-01 RX ORDER — DOCUSATE SODIUM 100 MG/1
100 CAPSULE, LIQUID FILLED ORAL 2 TIMES DAILY PRN
Status: DISCONTINUED | OUTPATIENT
Start: 2018-02-01 | End: 2018-02-03 | Stop reason: HOSPADM

## 2018-02-01 RX ADMIN — FLUTICASONE FUROATE AND VILANTEROL TRIFENATATE 1 PUFF: 200; 25 POWDER RESPIRATORY (INHALATION) at 08:04

## 2018-02-01 RX ADMIN — IPRATROPIUM BROMIDE AND ALBUTEROL SULFATE 3 ML: .5; 3 SOLUTION RESPIRATORY (INHALATION) at 15:39

## 2018-02-01 RX ADMIN — Medication 1 MG: at 01:10

## 2018-02-01 RX ADMIN — IPRATROPIUM BROMIDE AND ALBUTEROL SULFATE 3 ML: .5; 3 SOLUTION RESPIRATORY (INHALATION) at 11:57

## 2018-02-01 RX ADMIN — IPRATROPIUM BROMIDE AND ALBUTEROL SULFATE 3 ML: .5; 3 SOLUTION RESPIRATORY (INHALATION) at 08:03

## 2018-02-01 RX ADMIN — OSELTAMIVIR PHOSPHATE 30 MG: 30 CAPSULE ORAL at 20:15

## 2018-02-01 RX ADMIN — ARFORMOTEROL TARTRATE 15 MCG: 15 SOLUTION RESPIRATORY (INHALATION) at 08:03

## 2018-02-01 RX ADMIN — IPRATROPIUM BROMIDE AND ALBUTEROL SULFATE 3 ML: .5; 3 SOLUTION RESPIRATORY (INHALATION) at 19:47

## 2018-02-01 RX ADMIN — DOXYCYCLINE HYCLATE 100 MG: 100 TABLET, FILM COATED ORAL at 20:15

## 2018-02-01 RX ADMIN — DOXYCYCLINE HYCLATE 100 MG: 100 TABLET, FILM COATED ORAL at 09:54

## 2018-02-01 RX ADMIN — LEVOTHYROXINE SODIUM 75 MCG: 75 TABLET ORAL at 09:54

## 2018-02-01 RX ADMIN — ARFORMOTEROL TARTRATE 15 MCG: 15 SOLUTION RESPIRATORY (INHALATION) at 19:47

## 2018-02-01 RX ADMIN — OSELTAMIVIR PHOSPHATE 30 MG: 30 CAPSULE ORAL at 09:53

## 2018-02-01 RX ADMIN — PREDNISONE 40 MG: 20 TABLET ORAL at 09:54

## 2018-02-01 RX ADMIN — SENNOSIDES 8.6 MG: 8.6 TABLET, FILM COATED ORAL at 20:15

## 2018-02-01 RX ADMIN — TRAZODONE HYDROCHLORIDE 50 MG: 50 TABLET ORAL at 21:56

## 2018-02-01 RX ADMIN — ATORVASTATIN CALCIUM 20 MG: 20 TABLET, FILM COATED ORAL at 09:54

## 2018-02-01 RX ADMIN — DOCUSATE SODIUM 100 MG: 100 CAPSULE, LIQUID FILLED ORAL at 09:53

## 2018-02-01 RX ADMIN — METOPROLOL SUCCINATE 25 MG: 25 TABLET, EXTENDED RELEASE ORAL at 09:53

## 2018-02-01 ASSESSMENT — ACTIVITIES OF DAILY LIVING (ADL)
ADLS_ACUITY_SCORE: 11

## 2018-02-01 NOTE — PLAN OF CARE
Problem: Infection, Risk/Actual (Adult)  Goal: Identify Related Risk Factors and Signs and Symptoms  Related risk factors and signs and symptoms are identified upon initiation of Human Response Clinical Practice Guideline (CPG).   Outcome: Improving  Pt continues to be hospitalized for influenza A + COPD exacerbation. VSS. On 2L 02. Continues with frequent, productive cough. Tx; scheduled nebs, tamiflu, PO doxycycline and prednisone. IV saline locked. Had BM today. Regular diet, tolerating well. Up independently. Possible discharge back to [facility] 1-2 days if continues to improve.

## 2018-02-01 NOTE — PLAN OF CARE
Problem: Patient Care Overview  Goal: Plan of Care/Patient Progress Review  Outcome: No Change  Pt A&O, up in room independently, Tier A activity level. VSS, afebrile + sats maintained at low-90's, weaned to 2LPM. Denies pain. LS diminished, productive cough, denies SOB. Continues tamiflu, doxycycline and prednisone. Regular diet, good PO intake, voiding adequately. No BM today, left note for rounding MD regarding stool softeners/laxatives.

## 2018-02-01 NOTE — PLAN OF CARE
Problem: Infection, Risk/Actual (Adult)  Goal: Identify Related Risk Factors and Signs and Symptoms  Related risk factors and signs and symptoms are identified upon initiation of Human Response Clinical Practice Guideline (CPG).   Outcome: Improving  A&Ox4, up indep  Droplet precautions for Flu A  LDA: PIV SL  Vitals: stable  Pain: denies   LS dim, freq cough, green-yellow sputum  Voiding, no BM this shift  Diet: Regular  Plan: continue tamiflu & supportive cares. DC back to [facility] when able

## 2018-02-01 NOTE — PROGRESS NOTES
"[facility] RCAT     Date: 2-1-18    Admission Dx: Influenza    Pulmonary History:  COPD, CHF    Home Nebulizer/MDI Use: Advair, Albuterol Neb, Albuterol MDI    Home Oxygen: PRN    Acuity Level (RCAT flow sheet): 3    Aerosol Therapy initiated: Duoneb QID, Albuterol Q2 prn      Pulmonary Hygiene initiated: Deep Breathe and Cough      Volume Expansion initiated: IS      Current Oxygen Requirements: 2L    Current SpO2: 94%    Re-evaluation date: 2-4-18    Patient Education: educated patient on bronchodilators      See \"RT Assessments\" flow sheet for patient assessment scoring and Acuity Level Details.             "

## 2018-02-02 ENCOUNTER — APPOINTMENT (OUTPATIENT)
Dept: GENERAL RADIOLOGY | Facility: CLINIC | Age: 83
DRG: 193 | End: 2018-02-02
Attending: INTERNAL MEDICINE
Payer: MEDICARE

## 2018-02-02 LAB
ANION GAP SERPL CALCULATED.3IONS-SCNC: 3 MMOL/L (ref 3–14)
BASOPHILS # BLD AUTO: 0 10E9/L (ref 0–0.2)
BASOPHILS NFR BLD AUTO: 0 %
BUN SERPL-MCNC: 18 MG/DL (ref 7–30)
CALCIUM SERPL-MCNC: 9 MG/DL (ref 8.5–10.1)
CHLORIDE SERPL-SCNC: 102 MMOL/L (ref 94–109)
CO2 SERPL-SCNC: 32 MMOL/L (ref 20–32)
CREAT SERPL-MCNC: 0.84 MG/DL (ref 0.52–1.04)
DIFFERENTIAL METHOD BLD: ABNORMAL
ELLIPTOCYTES BLD QL SMEAR: SLIGHT
EOSINOPHIL # BLD AUTO: 0 10E9/L (ref 0–0.7)
EOSINOPHIL NFR BLD AUTO: 0 %
ERYTHROCYTE [DISTWIDTH] IN BLOOD BY AUTOMATED COUNT: 15.3 % (ref 10–15)
GFR SERPL CREATININE-BSD FRML MDRD: 64 ML/MIN/1.7M2
GLUCOSE SERPL-MCNC: 91 MG/DL (ref 70–99)
HCT VFR BLD AUTO: 34.5 % (ref 35–47)
HGB BLD-MCNC: 11 G/DL (ref 11.7–15.7)
LYMPHOCYTES # BLD AUTO: 34.2 10E9/L (ref 0.8–5.3)
LYMPHOCYTES NFR BLD AUTO: 95 %
MCH RBC QN AUTO: 29.8 PG (ref 26.5–33)
MCHC RBC AUTO-ENTMCNC: 31.9 G/DL (ref 31.5–36.5)
MCV RBC AUTO: 94 FL (ref 78–100)
MONOCYTES # BLD AUTO: 0.2 10E9/L (ref 0–1.3)
MONOCYTES NFR BLD AUTO: 0.5 %
NEUTROPHILS # BLD AUTO: 1.6 10E9/L (ref 1.6–8.3)
NEUTROPHILS NFR BLD AUTO: 4.5 %
PLATELET # BLD AUTO: 104 10E9/L (ref 150–450)
PLATELET # BLD EST: ABNORMAL 10*3/UL
POIKILOCYTOSIS BLD QL SMEAR: SLIGHT
POTASSIUM SERPL-SCNC: 4.4 MMOL/L (ref 3.4–5.3)
RBC # BLD AUTO: 3.69 10E12/L (ref 3.8–5.2)
SODIUM SERPL-SCNC: 137 MMOL/L (ref 133–144)
WBC # BLD AUTO: 36 10E9/L (ref 4–11)

## 2018-02-02 PROCEDURE — 12000000 ZZH R&B MED SURG/OB

## 2018-02-02 PROCEDURE — 85025 COMPLETE CBC W/AUTO DIFF WBC: CPT | Performed by: INTERNAL MEDICINE

## 2018-02-02 PROCEDURE — 40000275 ZZH STATISTIC RCP TIME EA 10 MIN

## 2018-02-02 PROCEDURE — 36415 COLL VENOUS BLD VENIPUNCTURE: CPT | Performed by: INTERNAL MEDICINE

## 2018-02-02 PROCEDURE — A9270 NON-COVERED ITEM OR SERVICE: HCPCS | Mod: GY | Performed by: INTERNAL MEDICINE

## 2018-02-02 PROCEDURE — 71046 X-RAY EXAM CHEST 2 VIEWS: CPT

## 2018-02-02 PROCEDURE — 25000132 ZZH RX MED GY IP 250 OP 250 PS 637: Mod: GY | Performed by: INTERNAL MEDICINE

## 2018-02-02 PROCEDURE — 25000125 ZZHC RX 250: Performed by: INTERNAL MEDICINE

## 2018-02-02 PROCEDURE — 80048 BASIC METABOLIC PNL TOTAL CA: CPT | Performed by: INTERNAL MEDICINE

## 2018-02-02 PROCEDURE — 94640 AIRWAY INHALATION TREATMENT: CPT | Mod: 76

## 2018-02-02 PROCEDURE — 99233 SBSQ HOSP IP/OBS HIGH 50: CPT | Performed by: INTERNAL MEDICINE

## 2018-02-02 PROCEDURE — 94640 AIRWAY INHALATION TREATMENT: CPT

## 2018-02-02 RX ADMIN — DOXYCYCLINE HYCLATE 100 MG: 100 TABLET, FILM COATED ORAL at 08:58

## 2018-02-02 RX ADMIN — IPRATROPIUM BROMIDE AND ALBUTEROL SULFATE 3 ML: .5; 3 SOLUTION RESPIRATORY (INHALATION) at 15:22

## 2018-02-02 RX ADMIN — OSELTAMIVIR PHOSPHATE 30 MG: 30 CAPSULE ORAL at 19:58

## 2018-02-02 RX ADMIN — DOXYCYCLINE HYCLATE 100 MG: 100 TABLET, FILM COATED ORAL at 19:58

## 2018-02-02 RX ADMIN — IPRATROPIUM BROMIDE AND ALBUTEROL SULFATE 3 ML: .5; 3 SOLUTION RESPIRATORY (INHALATION) at 11:09

## 2018-02-02 RX ADMIN — METOPROLOL SUCCINATE 25 MG: 25 TABLET, EXTENDED RELEASE ORAL at 08:57

## 2018-02-02 RX ADMIN — IPRATROPIUM BROMIDE AND ALBUTEROL SULFATE 3 ML: .5; 3 SOLUTION RESPIRATORY (INHALATION) at 19:34

## 2018-02-02 RX ADMIN — LEVOTHYROXINE SODIUM 75 MCG: 75 TABLET ORAL at 08:58

## 2018-02-02 RX ADMIN — ATORVASTATIN CALCIUM 20 MG: 20 TABLET, FILM COATED ORAL at 08:58

## 2018-02-02 RX ADMIN — FLUTICASONE FUROATE AND VILANTEROL TRIFENATATE 1 PUFF: 200; 25 POWDER RESPIRATORY (INHALATION) at 07:22

## 2018-02-02 RX ADMIN — PREDNISONE 40 MG: 20 TABLET ORAL at 08:58

## 2018-02-02 RX ADMIN — OSELTAMIVIR PHOSPHATE 30 MG: 30 CAPSULE ORAL at 08:57

## 2018-02-02 RX ADMIN — ARFORMOTEROL TARTRATE 15 MCG: 15 SOLUTION RESPIRATORY (INHALATION) at 07:21

## 2018-02-02 RX ADMIN — ARFORMOTEROL TARTRATE 15 MCG: 15 SOLUTION RESPIRATORY (INHALATION) at 19:34

## 2018-02-02 RX ADMIN — Medication 1 MG: at 00:40

## 2018-02-02 RX ADMIN — TRAZODONE HYDROCHLORIDE 50 MG: 50 TABLET ORAL at 22:18

## 2018-02-02 RX ADMIN — SENNOSIDES 8.6 MG: 8.6 TABLET, FILM COATED ORAL at 08:57

## 2018-02-02 RX ADMIN — IPRATROPIUM BROMIDE AND ALBUTEROL SULFATE 3 ML: .5; 3 SOLUTION RESPIRATORY (INHALATION) at 07:21

## 2018-02-02 ASSESSMENT — ACTIVITIES OF DAILY LIVING (ADL)
ADLS_ACUITY_SCORE: 11

## 2018-02-02 NOTE — PLAN OF CARE
Problem: Patient Care Overview  Goal: Plan of Care/Patient Progress Review  Outcome: Improving  Pt A&O, up in room independently, Tier A activity level. VSS, afebrile, O2 weaned and sats maintained at low-90's on RA. LS dim, coarse + exp wheezes, productive cough, denies SOB. Continues doxycycline, tamiflu + prednisone. Regular diet, good PO intake, voiding adequately.

## 2018-02-02 NOTE — PLAN OF CARE
Problem: Infection, Risk/Actual (Adult)  Goal: Identify Related Risk Factors and Signs and Symptoms  Related risk factors and signs and symptoms are identified upon initiation of Human Response Clinical Practice Guideline (CPG).   Outcome: Improving  A&Ox4, up ad fernanda  LDA: PIV SL  Vitals: stable, weaned off O2  Pain: denies   Voiding  Diet: regular, tolerating  Plan: DC back to [facility] when able

## 2018-02-02 NOTE — PLAN OF CARE
Problem: Patient Care Overview  Goal: Plan of Care/Patient Progress Review  Outcome: Improving    A/Ox4, ind, VSS, LS course w/ I/Owheezes, frequent cough, productive at times, receiving scheduled nebs, BS+, c/o constipation, PRN senna and prune juice given this AM, CMS intact, skin is ecchymotic. SW consult added for discharge planning. Plan is to continue prednisone, tamiflu and nebs and repeat CXR today. Possible D/C to home tomorrow. POC reviewed with patient, questions answered.

## 2018-02-02 NOTE — PROGRESS NOTES
[facility]  Hospitalist Progress Note    Name: Marie Kline    MRN: [id number]  Provider:  Hai Jeffries MD, [facility]    Date of Service: 02/02/2018     Reason for Stay (Diagnosis): Acute hypoxic respiratory failure secondary to influenza and COPD exacerbation         Summary of hospital stay & Assessment/Plan:   Summary of Stay: Marie Kline is a 85 year old female who was admitted on 1/29/2018  85-year-old white female with a history of chronic lymphocytic leukemia, stress induced cardiomyopathy, bladder cancer, COPD on oxygen p.r.n. she resides at [facility] and had heart contact with patients who have had influenza infection. She has been on IVIG for her CLL. Patient was admitted for shortness of breath and cough as well as fever of 101. She was positive for influenza A.  She was started on DuoNeb's, Tamiflu, oral prednisone. She was admitted for further management.     1.  Acute hypoxic respiratory failure likely secondary to influenza A and COPD exacerbation:   --Continue oral steroids, DuoNebs, Tamiflu.   --Will continue oral doxycycline, day#4 for a total of 1 week.  Sputum culture negative.   --Continue Tessalon for cough.     2.  History of chronic lymphocytic leukemia: WBC chronically elevated.      3.  Chronic kidney disease, stage 3:  Monitor.      4.  Hypothyroidism:  Continue with Synthroid.      5.  Coronary artery disease with prior history of stress induced cardiomyopathy: Continue PTA medications      CODE STATUS:  FULL CODE.         DVT Prophylaxis: Pneumatic Compression Devices     Code Status: Full Code  Expected discharge: Iwas hoping to send her home today however she still having periods of hypoxia, and significant respiratory distress while coughing with coarse breath sounds bilateral, repeat chest x-ray, she is immunocompromised relatively from CLL  tomorrow if continues to improve,     Entered: Hai Jeffries 02/02/2018, 10:46 AM                 Interval History:       Concern about discharge today, still having a harsh cough productive of yellow thick phlegm    Case discussed with patient nurse               Physical Exam:   Physical Exam   Temp: 97.8  F (36.6  C) Temp src: Oral BP: 145/63 Pulse: 75 Heart Rate: 84 Resp: 20 SpO2: 90 % O2 Device: None (Room air) Oxygen Delivery: 2 LPM  Vitals:     I/O last 3 completed shifts:  In: 720 [P.O.:720]  Out: 1250 [Urine:1250]        GENERAL:  Comfortable.   PSYCH: pleasant, oriented, No acute distress.  EYES: PERRLA, Normal conjunctiva.  HEART:  Normal S1, S2 with no edema.  LUNGS: Coarse breath sounds and scattered rhonchi with wheezes all over, normal Respiratory effort.  ABDOMEN:  Soft, no hepatosplenomegaly, normal bowel sounds.  SKIN:  Dry to touch, No rash.      Medications       oseltamivir  30 mg Oral BID     sodium chloride (PF)  3 mL Intracatheter Q8H     doxycycline  100 mg Oral Q12H ANA     arformoterol  15 mcg Nebulization Q12H     levothyroxine  75 mcg Oral Daily     atorvastatin  20 mg Oral Daily     predniSONE  40 mg Oral Daily     ipratropium - albuterol 0.5 mg/2.5 mg/3 mL  3 mL Nebulization 4x daily     fluticasone-vilanterol  1 puff Inhalation Daily     metoprolol succinate  25 mg Oral Daily     Data     -Data reviewed today:  I personally reviewed  all new labs and imaging results over the last 24 hours.      Recent Labs  Lab 02/02/18  0729 02/01/18  0753 01/30/18  0743   WBC 36.0* 39.6* 44.1*   HGB 11.0* 11.0* 12.2   HCT 34.5* 33.8* 37.4   MCV 94 92 92   * 95* 90*       Recent Labs  Lab 02/02/18  0729 02/01/18  0753 01/30/18  0743    134 133   POTASSIUM 4.4 4.5 4.7   CHLORIDE 102 100 98   CO2 32 32 31   ANIONGAP 3 2* 4   GLC 91 94 141*   BUN 18 15 15   CR 0.84 0.78 0.89   GFRESTIMATED 64 70 60*   GFRESTBLACK 78 85 73   CARLOS 9.0 8.7 8.8       No results found for this or any previous visit (from the past 24 hour(s)).    This document was produced using voice recognition software

## 2018-02-02 NOTE — PLAN OF CARE
Problem: Patient Care Overview  Goal: Plan of Care/Patient Progress Review  Outcome: Improving  Up in room independently on RA 92%.

## 2018-02-03 VITALS
HEART RATE: 66 BPM | TEMPERATURE: 96.5 F | SYSTOLIC BLOOD PRESSURE: 139 MMHG | DIASTOLIC BLOOD PRESSURE: 71 MMHG | OXYGEN SATURATION: 92 % | RESPIRATION RATE: 20 BRPM

## 2018-02-03 LAB — LACTATE BLD-SCNC: 1.3 MMOL/L (ref 0.7–2)

## 2018-02-03 PROCEDURE — 25000125 ZZHC RX 250: Performed by: INTERNAL MEDICINE

## 2018-02-03 PROCEDURE — 94640 AIRWAY INHALATION TREATMENT: CPT

## 2018-02-03 PROCEDURE — 25000132 ZZH RX MED GY IP 250 OP 250 PS 637: Mod: GY | Performed by: INTERNAL MEDICINE

## 2018-02-03 PROCEDURE — 36415 COLL VENOUS BLD VENIPUNCTURE: CPT | Performed by: INTERNAL MEDICINE

## 2018-02-03 PROCEDURE — A9270 NON-COVERED ITEM OR SERVICE: HCPCS | Mod: GY | Performed by: INTERNAL MEDICINE

## 2018-02-03 PROCEDURE — 40000275 ZZH STATISTIC RCP TIME EA 10 MIN

## 2018-02-03 PROCEDURE — 83605 ASSAY OF LACTIC ACID: CPT | Performed by: INTERNAL MEDICINE

## 2018-02-03 PROCEDURE — 99239 HOSP IP/OBS DSCHRG MGMT >30: CPT | Performed by: INTERNAL MEDICINE

## 2018-02-03 PROCEDURE — 94640 AIRWAY INHALATION TREATMENT: CPT | Mod: 76

## 2018-02-03 RX ORDER — DOXYCYCLINE HYCLATE 100 MG
100 TABLET ORAL EVERY 12 HOURS
Qty: 6 TABLET | Refills: 0 | Status: SHIPPED | OUTPATIENT
Start: 2018-02-03 | End: 2018-02-06

## 2018-02-03 RX ORDER — TRAZODONE HYDROCHLORIDE 50 MG/1
50 TABLET, FILM COATED ORAL
Qty: 30 TABLET | Refills: 0 | Status: SHIPPED | OUTPATIENT
Start: 2018-02-03 | End: 2018-11-18

## 2018-02-03 RX ORDER — BENZONATATE 100 MG/1
100 CAPSULE ORAL 3 TIMES DAILY PRN
Qty: 42 CAPSULE | Refills: 0 | Status: SHIPPED | OUTPATIENT
Start: 2018-02-03 | End: 2018-02-12

## 2018-02-03 RX ORDER — PREDNISONE 20 MG/1
20 TABLET ORAL DAILY
Qty: 3 TABLET | Refills: 0 | Status: SHIPPED | OUTPATIENT
Start: 2018-02-04 | End: 2018-02-07

## 2018-02-03 RX ORDER — OSELTAMIVIR PHOSPHATE 30 MG/1
30 CAPSULE ORAL DAILY
Qty: 5 CAPSULE | Refills: 0 | Status: SHIPPED | OUTPATIENT
Start: 2018-02-03 | End: 2018-02-08

## 2018-02-03 RX ADMIN — OSELTAMIVIR PHOSPHATE 30 MG: 30 CAPSULE ORAL at 09:32

## 2018-02-03 RX ADMIN — Medication 1 MG: at 00:26

## 2018-02-03 RX ADMIN — IPRATROPIUM BROMIDE AND ALBUTEROL SULFATE 3 ML: .5; 3 SOLUTION RESPIRATORY (INHALATION) at 07:20

## 2018-02-03 RX ADMIN — ATORVASTATIN CALCIUM 20 MG: 20 TABLET, FILM COATED ORAL at 09:32

## 2018-02-03 RX ADMIN — PREDNISONE 40 MG: 20 TABLET ORAL at 09:32

## 2018-02-03 RX ADMIN — LEVOTHYROXINE SODIUM 75 MCG: 75 TABLET ORAL at 09:32

## 2018-02-03 RX ADMIN — ARFORMOTEROL TARTRATE 15 MCG: 15 SOLUTION RESPIRATORY (INHALATION) at 07:20

## 2018-02-03 RX ADMIN — IPRATROPIUM BROMIDE AND ALBUTEROL SULFATE 3 ML: .5; 3 SOLUTION RESPIRATORY (INHALATION) at 11:25

## 2018-02-03 RX ADMIN — FLUTICASONE FUROATE AND VILANTEROL TRIFENATATE 1 PUFF: 200; 25 POWDER RESPIRATORY (INHALATION) at 07:20

## 2018-02-03 RX ADMIN — DOXYCYCLINE HYCLATE 100 MG: 100 TABLET, FILM COATED ORAL at 09:32

## 2018-02-03 RX ADMIN — METOPROLOL SUCCINATE 25 MG: 25 TABLET, EXTENDED RELEASE ORAL at 09:32

## 2018-02-03 ASSESSMENT — ACTIVITIES OF DAILY LIVING (ADL)
ADLS_ACUITY_SCORE: 11

## 2018-02-03 NOTE — PLAN OF CARE
Problem: Patient Care Overview  Goal: Plan of Care/Patient Progress Review  Outcome: Improving  Vitals: /54 (BP Location: Right arm)  Pulse 66  Temp 97  F (36.1  C) (Oral)  Resp 20  SpO2 93%  Pain: Pt denies pain at this time.  LOC: Alert and oriented x4  Mobility: Pt up independently.  Lungs:diminished with expiratory wheezes.  : Voiding without difficulty  GI: Last BM 2/2/18  IV: Saline locked  Plan: Possible discharge home today if symptoms continue to improve.

## 2018-02-03 NOTE — PLAN OF CARE
Problem: Patient Care Overview  Goal: Discharge Needs Assessment  AVS discussed with pt, questions and concerns addressed. Pt to d/c via private vehicle with family member. All meds and belongings sent with pt.

## 2018-02-03 NOTE — PLAN OF CARE
Problem: Patient Care Overview  Goal: Plan of Care/Patient Progress Review  Outcome: Improving  Given senna and prune juice earlier today. Has had 5 stools since. Hat pan placed to collect and document next stool if she has one. Ate well. Mostly on room air with oxygen sat of 92%. Did put on oxygen briefly at bedtime- states she uses oxygen prn at home. Requested and given sleeping pill.

## 2018-02-03 NOTE — DISCHARGE SUMMARY
[facility]  Discharge Summary  Hospitalist      Date of Admission:  1/29/2018  Date of Discharge:  2/3/2018  Provider:  Hai Jeffries MD. [facility]  Date of Service (when I last saw the patient): 02/03/18      Primary Provider: Piper Kiser          Discharge Diagnosis:     Discharge Diagnoses   Acute hypoxic respiratory failure secondary to influenza A and COPD exacerbation  Chronic lymphocytic leukemia  Chronic kidney disease stage III  Coronary artery disease with history of stress-induced cardiomyopathy  Hypothyroidism        Other medical issues:  Past Medical History:   Diagnosis Date     Bladder cancer (H)      Cardiomyopathy (H)      CLL (chronic lymphocytic leukemia) (H)      Congestive heart failure (H) 10/24/2014    flash pulm edema ass w/Takotsubo     COPD (chronic obstructive pulmonary disease) (H)     noc O2     Hypothyroid      Mumps      Myocardial infarction 10/2014    Takotsubo presentation w/mild CAD     Pulmonary edema cardiac cause (H) 10/08/2014    associated w/Takotsubo ACS     Tricuspid valve disorders, 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    TR 2-3+ per echo         Please see the admission history and physical for full details.     Hospital Course     Marie Kline was admitted on 1/29/2018.  The following problems were addressed during her hospitalization:    85 year old female who was admitted on 1/29/2018  85-year-old white female with a history of chronic lymphocytic leukemia, stress induced cardiomyopathy, bladder cancer, COPD on oxygen p.r.n. she resides at [facility] and had heart contact with patients who have had influenza infection. She has been on IVIG for her CLL. Patient was admitted for shortness of breath and cough as well as fever of 101. She was positive for influenza A.  She was started on DuoNeb's, Tamiflu, oral prednisone. She was admitted for further management.      1.  Acute hypoxic respiratory failure likely secondary to influenza A and COPD  exacerbation:   --Continue oral steroids, DuoNebs, Tamiflu.   She wants to stay on Tamiflu for another 5 days, she did have twice a day 30 mg in the hospital I think it is reasonable with the outbreak of influenza at her assisted living  We will continue prednisone and doxycycline for 3 more days for COPD exacerbation in addition to her nebulizer at home, sputum culture negative.   She also has a oxygen concentrator which I recommended she use 2 L/min as needed at home  --Continue Tessalon for cough.      2.  History of chronic lymphocytic leukemia: WBC chronically elevated.      3.  Chronic kidney disease, stage 3:  No acute kidney injury during this hospital stay      4.  Hypothyroidism:  Continue with Synthroid.       5.  Coronary artery disease with prior history of stress induced cardiomyopathy: No cardiac symptoms, stable with no chest pain, continue PTA medications      Home care ordered    Pending Results   Unresulted Labs Ordered in the Past 30 Days of this Admission     Date and Time Order Name Status Description    1/29/2018 1446 Blood culture Preliminary     1/29/2018 1443 Blood culture Preliminary           Discharge Orders     Home care nursing referral     Home Care PT Referral for Hospital Discharge     Home Care OT Referral for Hospital Discharge     Reason for your hospital stay   COPD exacerbation secondary to influenza A     Follow-up and recommended labs and tests    Follow-up with primary care physician next week     MD face to face encounter   Documentation of Face to Face and Certification for Home Health Services    I certify that patient: Marie Kline is under my care and that I, or a nurse practitioner or physician's assistant working with me, had a face-to-face encounter that meets the physician face-to-face encounter requirements with this patient on: 2/3/2018.    This encounter with the patient was in whole, or in part, for the following medical condition, which is the primary reason  for home health care: Recent hospitalization was influenza.    I certify that, based on my findings, the following services are medically necessary home health services: Nursing, Occupational Therapy and Physical Therapy.    My clinical findings support the need for the above services because: Nurse is needed: To provide assessment and oversight required in the home to assure adherence to the medical plan due to: Recent hospitalization was influenza..    Further, I certify that my clinical findings support that this patient is homebound (i.e. absences from home require considerable and taxing effort and are for medical reasons or Jew services or infrequently or of short duration when for other reasons) because: Leaving home is medically contraindicated for the following reason(s): Dyspnea on exertion that makes it so they cannot leave their home for needed services without clinical deterioration...    Based on the above findings. I certify that this patient is confined to the home and needs intermittent skilled nursing care, physical therapy and/or speech therapy.  The patient is under my care, and I have initiated the establishment of the plan of care.  This patient will be followed by a physician who will periodically review the plan of care.  Physician/Provider to provide follow up care: Piper Kiser    Attending hospital physician (the Medicare certified PEC[facility] provider): Hai Jeffries  Physician Signature: See electronic signature associated with these discharge orders.  Date: 2/3/2018     Full Code     Oxygen Adult   Renew Home Oxygen Order  Renew previous prescription.  Expected treatment length is indefinite (99 months).  Patient has a oxygen concentrator at home she should use 2 L/min as needed    Attending Provider: Hai Jeffries  Physician signature: See electronic signature associated with these discharge orders  Date of Order: February 3, 2018     Diet   Follow this diet upon  discharge: Orders Placed This Encounter     Combination Diet Regular Diet Adult         Code Status   Full Code       Primary Care Physician   Piper Kiser    Physical Exam   Temp: 96.5  F (35.8  C) Temp src: Oral BP: 139/71 Pulse: 66 Heart Rate: 74 Resp: 20 SpO2: 92 % O2 Device: None (Room air) Oxygen Delivery: 2 LPM  Vitals:     Vital Signs with Ranges  Temp:  [95.8  F (35.4  C)-97  F (36.1  C)] 96.5  F (35.8  C)  Pulse:  [66-84] 66  Heart Rate:  [72-90] 74  Resp:  [20-21] 20  BP: (123-153)/(53-72) 139/71  SpO2:  [83 %-95 %] 92 %  I/O last 3 completed shifts:  In: 280 [P.O.:280]  Out: 975 [Urine:975]    Constitutional:  alert, cooperative, no apparent distress  Respiratory: No increased work of breathing, good air exchange, no crackles or wheezing.  Cardiovascular: apical impulse,normal S1 and S2  GI: bowel sounds present, soft, non-distended, non-tender      Discharge Disposition   Discharged to home    Consultations This Hospital Stay   CARE COORDINATOR IP CONSULT  SOCIAL WORK IP CONSULT    Time Spent on this Encounter   IHai, personally saw the patient today and spent greater than 30 minutes discharging this patient.      Discharge Medications   Current Discharge Medication List      START taking these medications    Details   traZODone (DESYREL) 50 MG tablet Take 1 tablet (50 mg) by mouth nightly as needed for sleep  Qty: 30 tablet, Refills: 0    Associated Diagnoses: Insomnia, unspecified type      oseltamivir (TAMIFLU) 30 MG capsule Take 1 capsule (30 mg) by mouth daily for 5 days  Qty: 5 capsule, Refills: 0    Associated Diagnoses: Influenza A      benzonatate (TESSALON) 100 MG capsule Take 1 capsule (100 mg) by mouth 3 times daily as needed for cough  Qty: 42 capsule, Refills: 0    Associated Diagnoses: Influenza A      predniSONE (DELTASONE) 20 MG tablet Take 1 tablet (20 mg) by mouth daily for 3 days  Qty: 3 tablet, Refills: 0    Associated Diagnoses: Hypoxia      doxycycline  (VIBRA-TABS) 100 MG tablet Take 1 tablet (100 mg) by mouth every 12 hours for 3 days  Qty: 6 tablet, Refills: 0    Associated Diagnoses: Hypoxia         CONTINUE these medications which have NOT CHANGED    Details   albuterol (2.5 MG/3ML) 0.083% neb solution Take 1 vial by nebulization 2 times daily      multivitamin, therapeutic (THERA-VIT) TABS tablet Take 1 tablet by mouth daily      fluticasone-salmeterol (ADVAIR) 250-50 MCG/DOSE diskus inhaler Inhale 1 puff into the lungs 2 times daily      atorvastatin (LIPITOR) 20 MG tablet Take 1 tablet (20 mg) by mouth daily  Qty: 30 tablet, Refills: 1    Associated Diagnoses: Hyperlipidemia with target LDL less than 130      levothyroxine (SYNTHROID/LEVOTHROID) 75 MCG tablet Take 1 tablet (75 mcg) by mouth daily  Qty: 90 tablet, Refills: 2    Associated Diagnoses: Other specified hypothyroidism      metoprolol (TOPROL-XL) 25 MG 24 hr tablet Take 1 tablet (25 mg) by mouth daily  Qty: 90 tablet, Refills: 2    Associated Diagnoses: ACS (acute coronary syndrome) (H)      Cholecalciferol (VITAMIN D) 2000 UNITS tablet Take 2,000 Units by mouth daily  Qty: 100 tablet, Refills: 3    Associated Diagnoses: Vitamin D deficiency      denosumab (PROLIA) 60 MG/ML SOLN injection Inject 60 mg Subcutaneous every 6 months      albuterol (PROAIR HFA/PROVENTIL HFA/VENTOLIN HFA) 108 (90 BASE) MCG/ACT Inhaler Inhale 2 puffs into the lungs every 6 hours as needed for wheezing  Qty: 1 Inhaler, Refills: 8    Associated Diagnoses: Intermittent asthma without complication         STOP taking these medications       formoterol (PERFOROMIST) 20 MCG/2ML neb solution Comments:   Reason for Stopping:         calcium carbonate (OS-CARLOS 500 MG [place]. [place]) 500 MG tablet Comments:   Reason for Stopping:             Allergies   Allergies   Allergen Reactions     Contrast Dye Hives     Data   Most Recent 3 CBC's:  Recent Labs   Lab Test  02/02/18   0729  02/01/18   0753  01/30/18   0743   WBC  36.0*  39.6*  44.1*    HGB  11.0*  11.0*  12.2   MCV  94  92  92   PLT  104*  95*  90*      Most Recent 3 BMP's:  Recent Labs   Lab Test  02/02/18   0729  02/01/18   0753  01/30/18   0743   NA  137  134  133   POTASSIUM  4.4  4.5  4.7   CHLORIDE  102  100  98   CO2  32  32  31   BUN  18  15  15   CR  0.84  0.78  0.89   ANIONGAP  3  2*  4   CARLOS  9.0  8.7  8.8   GLC  91  94  141*     Most Recent 2 LFT's:  Recent Labs   Lab Test 09/12/17 03/27/17   0550  03/24/17   0545   AST  30  23  15   ALT  21  27  25   ALKPHOS   --   87  98   BILITOTAL   --   0.3  0.2     Most Recent INR's and Anticoagulation Dosing History:  Anticoagulation Dose History     Recent Dosing and Labs Latest Ref Rng & Units 10/9/2014    INR 0.86 - 1.14 1.21(H)        Most Recent 3 Troponin's:  Recent Labs   Lab Test  03/17/17   0515  06/13/16   1302  02/01/15   0346  01/31/15   2200   10/08/14   1212  10/08/14   0900   TROPI  0.021   --   0.241*  0.300*   < >   --    --    TROPONIN   --   0.00   --    --    --   1.22*  0.93*    < > = values in this interval not displayed.     Most Recent Cholesterol Panel:  Recent Labs   Lab Test 09/12/17   CHOL  145   LDL  82   HDL  44   TRIG  95     Most Recent 6 Bacteria Isolates From Any Culture (See EPIC Reports for Culture Details):  Recent Labs   Lab Test  01/30/18   1123  01/29/18   1508  01/29/18   1447  06/22/16   1221  06/16/16   0145  06/15/16   0703   CULT  Moderate growth  Normal yovanny    No growth after 5 days  No growth after 5 days  Candida albicans / dubliniensis isolated Candida albicans and Candida   dubliniensis are not routinely speciated  No additional fungi cultured after 4 weeks incubation  *  No growth  No growth     Most Recent TSH, T4 and A1c Labs:  Recent Labs   Lab Test 09/12/17 06/17/16   0400   10/09/14   0350   TSH  2.54   < >   --    < >  5.80*   T4   --    --    --    --   1.07   A1C   --    --   5.6   < >  5.6    < > = values in this interval not displayed.     Results for orders placed or performed  during the hospital encounter of 01/29/18   XR Chest 2 Views    Narrative    CHEST TWO VIEWS   1/29/2018 4:03 PM     INDICATION: Shortness of breath.    COMPARISON: 8/9/2017.      Impression    IMPRESSION: Emphysema with scattered interstitial prominence likely  due to slight scattered fibrosis. No new consolidative infiltrates or  other acute findings. Normal heart size. Scoliosis convex to the right  in the thoracic spine.    SIM CASE MD   XR Chest 2 Views    Narrative    XR CHEST 2 VW 2/2/2018 12:10 PM    HISTORY: Evaluate for pneumonia. Influenza.    COMPARISON: 1/29/2018    FINDINGS: No airspace consolidation, pleural effusion or pneumothorax.  Normal heart size. Moderate scoliosis redemonstrated.      Impression    IMPRESSION: No evidence of pneumonia.    REMINGTON ANDERSON MD           Disclaimer: This note consists of symbols derived from keyboarding, dictation and/or voice recognition software. As a result, there may be errors in the script that have gone undetected. Please consider this when interpreting information found in this chart.

## 2018-02-04 ENCOUNTER — DOCUMENTATION ONLY (OUTPATIENT)
Dept: CARE COORDINATION | Facility: CLINIC | Age: 83
End: 2018-02-04

## 2018-02-04 LAB
BACTERIA SPEC CULT: NO GROWTH
BACTERIA SPEC CULT: NO GROWTH
Lab: NORMAL
Lab: NORMAL
SPECIMEN SOURCE: NORMAL
SPECIMEN SOURCE: NORMAL

## 2018-02-04 NOTE — PROGRESS NOTES
[facility] now requests orders and shares plan of care/discharge summaries for some patients through [employer].  Please REPLY TO THIS MESSAGE in order to give authorization for orders when needed.  This is considered a verbal order, you will still receive a faxed copy of orders for signature.  Thank you for your assistance in improving collaboration for our patients.    ORDER  SN 3w1, 2w1, 1w2, 3 as needed visits for disease management, cardiopulmonary assessments, and home safety following hospitalization for medication teaching and evaluation and compliance with medical plan. PT to evaluate and treat to include strengthening and home exercise program. OT to evaluate and treat to include ADL/IADL safety and endurance training.    MD SUMMARY/PLAN OF CARE  SUMMARY TO MD  SITUATION   Hospitalized for upper respiratory infection, found to have Influenza A. IV INT was left in patient right forearm, SN removed and bandage applied. She was admitted to [facility] 1.29.18 to 02.03.18. She is now home and was prescribed several new medications. She is short of breath easily during conversation and minimal activity. SHe supports herself on furniture while walking around home. Her gait is very quick and fast paced. Bilateral lungs sounds are scattered rales. She has a productive congested cough with thin yellow secretions. She sets up her own medications. She has 2 sons and 2 daugthers. One of her daughters live closeby and assists with cares, transportation, and assists to meet her needs. Currently her daugther is out of town and her grandson and great grandson are assisting her as needed and able. She lives alone in an independnent apartmetn of an assisted living facility. She has strong spiritual beliefs to assist her in healing. Her appetite is fair, denies concerns with swallowing. She is in need of food in home, has enough to last until her daughter gets back from trip to assist with grocery shopping.  She verbalizes drinking plenty of fluids and water to stay hydrated and thin secretions.   VS...Temp 98.5 HR 70 RR 16 to 22 SpO2 94 percent in room air Wt 110 lbs Ht 59 in  BACKGROUND  She was in hospital for a COPD exacerbation secondary to influenza A. This caused hypoxia and acute respiratory failure. She has a history of bladder cancer, Purpura senilis, chronic kidney disease stage 3, Sepsis, degenerative disc disease, generalized anxiety disorder, congestive heart failure, nonischemic cardiomyopathy, and acute and chronic respiratory failure with hypercapnia.  ANALYSIS She was started on Benzonate for coughing, Doxycycline, Osteltamivir, Prednisone, Trazodone.    RECOMMENDATION SN for disease management, cardiopulmonary assessment, and home safety following hospitalization. PT to evaluate strength home exercise program following hospitalization. OT to evalaute ADL/IADl safety, and endurance training. SW declined. HHA declined.

## 2018-02-05 ENCOUNTER — TELEPHONE (OUTPATIENT)
Dept: INTERNAL MEDICINE | Facility: CLINIC | Age: 83
End: 2018-02-05

## 2018-02-05 NOTE — TELEPHONE ENCOUNTER
"Pt has called back x2 and calls have been disconnected with transfer each time per pt representative.    ED/Discharge Protocol    \"Hi, my name is Brandee Cordova, a registered nurse, and I am calling on behalf of Dr. Kiser's office at [place].  I am calling to follow up and see how things are going for you after your recent visit.\"    \"I see that you were in the (ER/UC/IP) on 01/29.    How are you doing now that you are home?\" Pt reports still feeling sick.    Is patient experiencing symptoms that may require a hospital visit?  No    Discharge Instructions    \"Let's review your discharge instructions.  What is/are the follow-up recommendations?  Pt. Response: f/u with PCP    \"Were you instructed to make a follow-up appointment?\"  Pt. Response: Yes, not scheduled yet. Offered appt this week, but pt reports she doesn't feel that she should go outside quite yet this week d/t recovering. Scheduled appt for 02/12.       \"When you see the provider, I would recommend that you bring your discharge instructions with you.    Medications    \"How many new medications are you on since your hospitalization/ED visit?\"    2 or more - Epic MTM referral needed  \"How many of your current medicines changed (dose, timing, name, etc.) while you were in the hospital/ED visit?\"   0-1  \"Do you have questions about your medications?\"   No  \"Were you newly diagnosed with heart failure, COPD, diabetes or did you have a heart attack?\"   No  For patients on insulin: \"Did you start on insulin in the hospital or did you have your insulin dose changed?\"   No    Medication reconciliation completed? Yes    Was MTM referral placed (*Make sure to put transitions as reason for referral)?   No, most medications are short-term    Call Summary    \"Do you have any questions or concerns about your condition or care plan at the moment?\"    No  Triage nurse advice given: Call clinic if symptoms worsen and/or questions/concerns.    Patient was in ER 2 in " "the past year (assess appropriateness of ER visits.)      \"If you have questions or things don't continue to improve, we encourage you contact us through the main clinic number,  [phone].  Even if the clinic is not open, triage nurses are available 24/7 to help you.     We would like you to know that our clinic has extended hours (provide information).  We also have urgent care (provide details on closest location and hours/contact info)\"      \"Thank you for your time and take care!\"  "

## 2018-02-05 NOTE — TELEPHONE ENCOUNTER
IP F/U    Date: 02/03/18  Diagnosis: Influenza A; Insomnia  Is patient active in care coordination? No  Was patient in TCU? No

## 2018-02-09 ENCOUNTER — TELEPHONE (OUTPATIENT)
Dept: INTERNAL MEDICINE | Facility: CLINIC | Age: 83
End: 2018-02-09

## 2018-02-09 DIAGNOSIS — J20.9 ACUTE BRONCHITIS, UNSPECIFIED ORGANISM: Primary | ICD-10-CM

## 2018-02-09 RX ORDER — PREDNISONE 20 MG/1
20 TABLET ORAL DAILY
Qty: 5 TABLET | Refills: 0 | Status: SHIPPED | OUTPATIENT
Start: 2018-02-09 | End: 2018-02-12

## 2018-02-09 NOTE — TELEPHONE ENCOUNTER
Fatou from [facility] calling with update.  Pt finished her tapering dose of Prednisone 2-7-18 and had a horrible night on Wed night.    Per nurse, states lungstight and pt is wheezy.  She advised pt take OTC Mucinex BID.    O2 sats on RA are 87-89% and then after she coughed up a plug, was 90%    Nurse asking if additional Prednisone would be warranted.    If ok, please send rx to pharm.    Please advise, thanks.

## 2018-02-09 NOTE — TELEPHONE ENCOUNTER
Recommend patient go to the ER again if prednisone does not help.    Patient reports that her oxygen saturation is 91%.

## 2018-02-12 ENCOUNTER — OFFICE VISIT (OUTPATIENT)
Dept: INTERNAL MEDICINE | Facility: CLINIC | Age: 83
End: 2018-02-12
Payer: MEDICARE

## 2018-02-12 VITALS
OXYGEN SATURATION: 92 % | WEIGHT: 104 LBS | BODY MASS INDEX: 20.96 KG/M2 | HEART RATE: 66 BPM | DIASTOLIC BLOOD PRESSURE: 70 MMHG | SYSTOLIC BLOOD PRESSURE: 120 MMHG | HEIGHT: 59 IN | TEMPERATURE: 98.4 F

## 2018-02-12 DIAGNOSIS — R09.02 HYPOXIA: ICD-10-CM

## 2018-02-12 DIAGNOSIS — J10.1 INFLUENZA A: Primary | ICD-10-CM

## 2018-02-12 PROCEDURE — 99495 TRANSJ CARE MGMT MOD F2F 14D: CPT | Performed by: INTERNAL MEDICINE

## 2018-02-12 RX ORDER — FERROUS SULFATE 325(65) MG
325 TABLET ORAL
COMMUNITY

## 2018-02-12 NOTE — NURSING NOTE
"Chief Complaint   Patient presents with     Hospital F/U     FVR 1/29/18-2/3/18 influenza a and hypoxia, still SOB(obvious) and exhausted        Initial /70 (BP Location: Left arm, Cuff Size: Adult Regular)  Pulse 66  Temp 98.4  F (36.9  C) (Oral)  Ht 4' 11\" (1.499 m)  Wt 104 lb (47.2 kg)  SpO2 92%  Breastfeeding? No  BMI 21.01 kg/m2 Estimated body mass index is 21.01 kg/(m^2) as calculated from the following:    Height as of this encounter: 4' 11\" (1.499 m).    Weight as of this encounter: 104 lb (47.2 kg).  Medication Reconciliation: complete   Na Knott CMA      "

## 2018-02-12 NOTE — PROGRESS NOTES
SUBJECTIVE:   Marie Kline is a 85 year old female who presents to clinic today for the following health issues:          Hospital Follow-up Visit:    Hospital/Nursing Home/IP Rehab Facility: [facility]  Date of Admission: 1/29/18  Date of Discharge: 2/3/18  Reason(s) for Admission: influenza a & hypoxia    Patient was diagnosed with influenza.  She received tamiflu and prednisone.   After her prednisone taper, she requested additional prednisone.   She feels 50% better since.   She has issues with the coughing and waking her up at night.   Her oxygen has improved over 88%.  She is not longer using the oxygen.             Problems taking medications regularly:  None       Medication changes since discharge: None       Problems adhering to non-medication therapy:  None    Summary of hospitalization:  [facility] discharge summary reviewed  Diagnostic Tests/Treatments reviewed.  Follow up needed: none  Other Healthcare Providers Involved in Patient s Care:         None  Update since discharge: improved.     Post Discharge Medication Reconciliation: discharge medications reconciled, continue medications without change.  Plan of care communicated with patient     Coding guidelines for this visit:  Type of Medical   Decision Making Face-to-Face Visit       within 7 Days of discharge Face-to-Face Visit        within 14 days of discharge   Moderate Complexity [id number]   High Complexity 85082 [id number]              PROBLEMS TO ADD ON...    Problem list and histories reviewed & adjusted, as indicated.  Additional history: as documented    Reviewed and updated as needed this visit by clinical staff       Reviewed and updated as needed this visit by Provider         ROS:  C: NEGATIVE for fever, chills, change in weight  R: POS cough and SOB  CV: NEGATIVE for chest pain, palpitations or peripheral edema    OBJECTIVE:     /70 (BP Location: Left arm, Cuff Size: Adult Regular)  Pulse 66  Temp 98.4  F  "(36.9  C) (Oral)  Ht 4' 11\" (1.499 m)  Wt 104 lb (47.2 kg)  SpO2 92%  Breastfeeding? No  BMI 21.01 kg/m2  Body mass index is 21.01 kg/(m^2).  GENERAL: healthy, alert and no distress  RESP: lungs clear to auscultation - no rales; ronchi bilaterally  CV: regular rate and rhythm, normal S1 S2, no S3 or S4, no murmur, click or rub      ASSESSMENT/PLAN:       (J10.1) Influenza A  (primary encounter diagnosis)  Comment: symptoms improving   Plan: monitor    (R09.02) Hypoxia  Comment: improving  Plan: oxygen as needed          Piper Kiser MD  [facility]    "

## 2018-02-12 NOTE — MR AVS SNAPSHOT
After Visit Summary   2/12/2018    Marie Kline    MRN: [id number]           Patient Information     Date Of Birth          4/28/1932        Visit Information        Provider Department      2/12/2018 3:40 PM Piper Kiser MD [facility]        Today's Diagnoses     Influenza A    -  1    Hypoxia           Follow-ups after your visit        Your next 10 appointments already scheduled     Mar 08, 2018  9:30 AM CST   Cystoscopy with Kar Nuñez MD, UB CYF   [facility] ([facility])    [address]  [address]  [facility] [address] [phone]              Who to contact     If you have questions or need follow up information about today's clinic visit or your schedule please contact [facility] directly at [phone].  Normal or non-critical lab and imaging results will be communicated to you by MyChart, letter or phone within 4 business days after the clinic has received the results. If you do not hear from us within 7 days, please contact the clinic through MyChart or phone. If you have a critical or abnormal lab result, we will notify you by phone as soon as possible.  Submit refill requests through [employer] or call your pharmacy and they will forward the refill request to us. Please allow 3 business days for your refill to be completed.          Additional Information About Your Visit        MyChart Information     [employer] gives you secure access to your electronic health record. If you see a primary care provider, you can also send messages to your care team and make appointments. If you have questions, please call your primary care clinic.  If you do not have a primary care provider, please call [phone] and they will assist you.        Care EveryWhere ID     This is your Care EveryWhere ID. This could be used by other organizations to access your [facility]  "records  [id number]        Your Vitals Were     Pulse Temperature Height Pulse Oximetry Breastfeeding? BMI (Body Mass Index)    66 98.4  F (36.9  C) (Oral) 4' 11\" (1.499 m) 92% No 21.01 kg/m2       Blood Pressure from Last 3 Encounters:   02/12/18 120/70   02/03/18 139/71   01/08/18 105/53    Weight from Last 3 Encounters:   02/12/18 104 lb (47.2 kg)   01/08/18 106 lb 9.6 oz (48.4 kg)   10/05/17 106 lb (48.1 kg)              Today, you had the following     No orders found for display         Today's Medication Changes          These changes are accurate as of 2/12/18  9:31 PM.  If you have any questions, ask your nurse or doctor.               These medicines have changed or have updated prescriptions.        Dose/Directions    traZODone 50 MG tablet   Commonly known as:  DESYREL   This may have changed:  how much to take   Used for:  Insomnia, unspecified type        Dose:  50 mg   Take 1 tablet (50 mg) by mouth nightly as needed for sleep   Quantity:  30 tablet   Refills:  0         Stop taking these medicines if you haven't already. Please contact your care team if you have questions.     benzonatate 100 MG capsule   Commonly known as:  TESSALON   Stopped by:  Piper Kiser MD           fluticasone-salmeterol 250-50 MCG/DOSE diskus inhaler   Commonly known as:  ADVAIR   Stopped by:  Piper Kiser MD           multivitamin, therapeutic Tabs tablet   Stopped by:  Piper Kiser MD                    Primary Care Provider Office Phone # Fax #    Piper Kiser [phone]       [address] [facility] [address]        Equal Access to Services     [facility] Center[facility]IT[facility] AH: Hadii juan sanchez Soabelardo, waaxda luqadaha, qaybta kaalmada adeegyada, zoe sykes. So [facility] [phone].    ATENCIÓN: Si habla español, tiene a eaton disposición servicios gratuitos de asistencia lingüística. Llame al [phone].    We comply with applicable federal civil " rights laws and [place] laws. We do not discriminate on the basis of race, color, national origin, age, disability, sex, sexual orientation, or gender identity.            Thank you!     Thank you for choosing [facility]  for your care. Our goal is always to provide you with excellent care. Hearing back from our patients is one way we can continue to improve our services. Please take a few minutes to complete the written survey that you may receive in the mail after your visit with us. Thank you!             Your Updated Medication List - Protect others around you: Learn how to safely use, store and throw away your medicines at www.disposemymeds.org.          This list is accurate as of 2/12/18  9:31 PM.  Always use your most recent med list.                   Brand Name Dispense Instructions for use Diagnosis    * albuterol (2.5 MG/3ML) 0.083% neb solution      Take 1 vial by nebulization 2 times daily        * albuterol 108 (90 BASE) MCG/ACT Inhaler    PROAIR HFA/PROVENTIL HFA/VENTOLIN HFA    1 Inhaler    Inhale 2 puffs into the lungs every 6 hours as needed for wheezing    Intermittent asthma without complication       atorvastatin 20 MG tablet    LIPITOR    30 tablet    Take 1 tablet (20 mg) by mouth daily    Hyperlipidemia with target LDL less than 130       denosumab 60 MG/ML Soln injection    PROLIA     Inject 60 mg Subcutaneous every 6 months        ferrous sulfate 325 (65 FE) MG tablet    IRON     Take by mouth daily (with breakfast)        levothyroxine 75 MCG tablet    SYNTHROID/LEVOTHROID    90 tablet    Take 1 tablet (75 mcg) by mouth daily    Other specified hypothyroidism       metoprolol succinate 25 MG 24 hr tablet    TOPROL-XL    90 tablet    Take 1 tablet (25 mg) by mouth daily    ACS (acute coronary syndrome) (H)       MULTIVITAMIN GUMMIES ADULT PO           traZODone 50 MG tablet    DESYREL    30 tablet    Take 1 tablet (50 mg) by mouth nightly as needed for sleep    Insomnia,  unspecified type       * Notice:  This list has 2 medication(s) that are the same as other medications prescribed for you. Read the directions carefully, and ask your doctor or other care provider to review them with you.

## 2018-02-19 ENCOUNTER — TRANSFERRED RECORDS (OUTPATIENT)
Dept: HEALTH INFORMATION MANAGEMENT | Facility: CLINIC | Age: 83
End: 2018-02-19

## 2018-02-19 LAB
ALT SERPL-CCNC: 15 IU/L (ref 7–52)
AST SERPL-CCNC: 21 U/L (ref 13–39)
CREAT SERPL-MCNC: 1.13 MG/DL (ref 0.6–1.3)
GFR SERPL CREATININE-BSD FRML MDRD: 44 ML/MIN/1.73M2
GLUCOSE SERPL-MCNC: 90 MG/DL (ref 70–110)
POTASSIUM SERPL-SCNC: 4.2 MMOL/L (ref 3.5–5.1)

## 2018-02-26 ENCOUNTER — MYC MEDICAL ADVICE (OUTPATIENT)
Dept: INTERNAL MEDICINE | Facility: CLINIC | Age: 83
End: 2018-02-26

## 2018-02-27 ENCOUNTER — TELEPHONE (OUTPATIENT)
Dept: INTERNAL MEDICINE | Facility: CLINIC | Age: 83
End: 2018-02-27

## 2018-02-27 PROCEDURE — G0180 MD CERTIFICATION HHA PATIENT: HCPCS | Performed by: INTERNAL MEDICINE

## 2018-02-27 NOTE — TELEPHONE ENCOUNTER
Per epic therapy plan Prolia injection orders  10/27/17. Last appt for Prolia injection was t/ infusion center 17.

## 2018-02-28 ENCOUNTER — MYC MEDICAL ADVICE (OUTPATIENT)
Dept: INTERNAL MEDICINE | Facility: CLINIC | Age: 83
End: 2018-02-28

## 2018-02-28 DIAGNOSIS — B37.0 THRUSH: Primary | ICD-10-CM

## 2018-02-28 NOTE — TELEPHONE ENCOUNTER
[facility] calls to check on question below. They states pt has been calling them to check on this.

## 2018-02-28 NOTE — TELEPHONE ENCOUNTER
[facility] pharmacy calls.     They state they do not know the combination amounts to make this mouth wash.     She states if Dr Kiser wants Magic Mouthwash, they can do this.     Pharmacy doesn't think they can get Tetracycline.     Please advise.

## 2018-02-28 NOTE — TELEPHONE ENCOUNTER
Korina RN from [facility] calls, states pt contacted them to report thrush symptoms in mouth. Indicated she had hx of thrush last year that eventually extended into throat. Pt denies throat involvement at this time. Korina offered to send a nurse out to pt. Pt declined stating she's hoping PCP will send in rx.    Per [employer] message below pt reports crack in corner of mouth and thrush symptoms after using a new neb (has been rinsing mouth after). Biotene mouthwash hasn't helped.    Please advise, thanks.

## 2018-03-01 ENCOUNTER — TELEPHONE (OUTPATIENT)
Dept: INTERNAL MEDICINE | Facility: CLINIC | Age: 83
End: 2018-03-01

## 2018-03-01 DIAGNOSIS — J44.1 COPD EXACERBATION (H): ICD-10-CM

## 2018-03-01 DIAGNOSIS — J96.22 ACUTE AND CHRONIC RESPIRATORY FAILURE WITH HYPERCAPNIA (H): Primary | ICD-10-CM

## 2018-03-01 NOTE — TELEPHONE ENCOUNTER
Destiney RN with [facility] ([phone]) calling from patient's home.    1.   Patient's O2 sat is hovering around 82% on room air.  Patient does have oxygen at home and uses as needed.  Patient will put her O2 on now, used it through the night last night.  2.   Patient complains of ongoing cough since before 1/29/18 hospitalization.  Cough is productive of milky white phlegm, sometimes yellow in the mornings.  Has had bloody nose from R nostril twice during coughing episodes.  Is worn out but does not feel she needs cough med at this time.  She took 2 sleeping pills and a Benadryl last night and slept very well.  Chest is very rattly, unable to really listen to her lungs as pt starts coughing with inspiration or conversation.  Temp 98.4   3.  Re: terrible thrush with cracks in mouth and oozing from cracks: she will  the Magic Mouthwash as pharmacy said it would be ready soon.  Going with equal parts Benadryl/Maalox/Lidocaine, no Tetracycline.  4.  Asking if PCP will be giving her another inhaler. Does not want another neb solution and cannot afford some meds like the Advair.  Many are too costly for her.  She's hoping an inhaler may help her cough.   5.  Requests approval of the following orders:  SN visit twice a week x2 wks, once a week x2 wks and 2 as needed visits.  This was approved per RN protocol.  ZAID Sin R.N.

## 2018-03-01 NOTE — TELEPHONE ENCOUNTER
Order sent to [facility] was for same mouthwash as initial order. Contacted [facility], they state their magic mouthwash is equal parts benadryl, lidocaine and Maalox. They will prepare this for pt.

## 2018-03-01 NOTE — TELEPHONE ENCOUNTER
"Patient will call for an inhaler that is covered by her insurance.     Patient reports she now has oxygen of 96%.    The patient does not want to go to the ER.  She feels like her cough is not bad, and her phlegm just feels \"slimy.\"      "

## 2018-03-05 ENCOUNTER — TELEPHONE (OUTPATIENT)
Dept: INTERNAL MEDICINE | Facility: CLINIC | Age: 83
End: 2018-03-05

## 2018-03-05 DIAGNOSIS — J44.1 COPD EXACERBATION (H): Primary | ICD-10-CM

## 2018-03-05 NOTE — TELEPHONE ENCOUNTER
Pt already picked up Breo inhaler and paid $375(1 month supply) for it. I told her I would route alternatives to PCP to see which one she recommends from list below.

## 2018-03-05 NOTE — TELEPHONE ENCOUNTER
Form received from: [facility]    Form requesting following info/need: SN orders    PERCY needed?: No    Location of form: Dr. Kiser's in basket    When completed the route for return: Fax

## 2018-03-05 NOTE — TELEPHONE ENCOUNTER
FYI~ Thurs Feb 28, 2018 I spoke with Marie and her daughter Margaret.  Marie  is in need of financial assistance for medication.    We reviewed the Pharmacy Assistance Fund $500, the Prescription Assistance Program for manfacturer brand name assistance programs, gross income, insurance and Rx list.    Marie is not eligible for the Pharmacy Assistance Fund at this time.    Marie is in need of assistance with her inhalers.  She is over income for the Breo program.    Other programs Marie should be eligible for are:  Dulera, Asmanex Twisthaler, Symbicort, Pulmicort, Tudorza Pressair or Bevespi.    If one of those inhalers would be an appropriate alternative, I need the drug name and dose information for the application.    I will be applying for Proventil HFA.    Thanks so much for your help!    Rupali Lewis  Prescription

## 2018-03-05 NOTE — TELEPHONE ENCOUNTER
Pt sent Mychart response stating she would like to continue receiving Prolia in our office.     Dr. Kiser - please advise if okay to order next Prolia injection for pt.

## 2018-03-06 ENCOUNTER — TELEPHONE (OUTPATIENT)
Dept: INTERNAL MEDICINE | Facility: CLINIC | Age: 83
End: 2018-03-06

## 2018-03-06 DIAGNOSIS — M81.0 SENILE OSTEOPOROSIS: Primary | ICD-10-CM

## 2018-03-06 DIAGNOSIS — N18.30 CKD (CHRONIC KIDNEY DISEASE) STAGE 3, GFR 30-59 ML/MIN (H): ICD-10-CM

## 2018-03-06 NOTE — PATIENT INSTRUCTIONS
PROLIA  Risk Evaluation and Mitigation Strategy Best Practice Advisory    In May 2015, the FDA required an update to the PROLIA  (denosumab) REMS (Risk Evaluation and Mitigation Strategy) to inform both providers and patients about potential serious risks related to PROLIA .    These potential serious risks include:    Hypocalcemia    Osteonecrosis of the jaw    Atypical femoral fractures    Serious Infections    Dermatologic reactions    To ensure compliance with these requirements [employer] [facility] has developed a workflow for those patients who will receive PROLIA  at clinic.  This workflow includes insurance verification, patient scheduling, patient education, and documentation. Registered Nurses in the clinic should have completed eLearning related to the REMS and the clinic workflow.  Refer to them to initiate this process.  This workflow does not need to be executed if the patient is to receive PROLIA  injection at [facility].       The  has put together a Patient Education Toolkit.  Copies of these handouts may be available your clinic. Patients must receive the Patient Brochure and Medication Guide when receiving injection at clinic.  These will be attached to the injection ordered from [employer] to the clinic. Links to handouts:  Patient Counseling Chart for Healthcare Providers  Patient Brochure  Prescribing Information  Medication Guide    If you are having trouble accessing the above links, these documents can be found at: http://www.proliahcp.com/fpws-[email or web address]-[email or web address]/[email or web address]    The role of healthcare provider includes reviewing patient education materials with the patient, advising patients to seek medical attention if they have signs or symptoms of one of the serious risks, and provide patients a copy of the Patient Brochure and Medication Guide when receiving their injection.      Due to the risk of hypocalcemia the Prescribing  Information for PROLIA  recommends that calcium and mineral levels (magnesium and phosphorus) be checked within 14 days of injection for those predisposed to hypocalcemia.

## 2018-03-06 NOTE — TELEPHONE ENCOUNTER
Pt calls, she received [employer] message stating we have not had a chance to review coverage for Prolia (see 2/26/18 bettermarkshart encounter). Pt wanted to have this done when she sees Dr. Nuñez on Thursday and was told her [employer] plan would cover what Medicare does not. Informed pt that we need get confirmation of this due to cost of medication. Our office does this online through the [employer] website, will complete this today for pt and notify her when insurance is verified. Pt informed it's unlikely that we will have this done in time for appt with Dr. Nuñez.     Order placed for Prolia.    [employer] enrollment completed for pt. Awaiting insurance verification.

## 2018-03-07 DIAGNOSIS — Z85.51 PERSONAL HISTORY OF MALIGNANT NEOPLASM OF BLADDER: Primary | ICD-10-CM

## 2018-03-08 ENCOUNTER — OFFICE VISIT (OUTPATIENT)
Dept: UROLOGY | Facility: CLINIC | Age: 83
End: 2018-03-08
Payer: MEDICARE

## 2018-03-08 VITALS
HEART RATE: 80 BPM | BODY MASS INDEX: 21.17 KG/M2 | WEIGHT: 105 LBS | SYSTOLIC BLOOD PRESSURE: 104 MMHG | DIASTOLIC BLOOD PRESSURE: 68 MMHG | HEIGHT: 59 IN

## 2018-03-08 DIAGNOSIS — Z85.51 PERSONAL HISTORY OF MALIGNANT NEOPLASM OF BLADDER: ICD-10-CM

## 2018-03-08 LAB
ALBUMIN UR-MCNC: NEGATIVE MG/DL
APPEARANCE UR: CLEAR
BILIRUB UR QL STRIP: NEGATIVE
COLOR UR AUTO: YELLOW
GLUCOSE UR STRIP-MCNC: NEGATIVE MG/DL
HGB UR QL STRIP: NEGATIVE
KETONES UR STRIP-MCNC: NEGATIVE MG/DL
LEUKOCYTE ESTERASE UR QL STRIP: NEGATIVE
NITRATE UR QL: NEGATIVE
PH UR STRIP: 7 PH (ref 5–7)
SOURCE: NORMAL
SP GR UR STRIP: 1.01 (ref 1–1.03)
UROBILINOGEN UR STRIP-ACNC: 0.2 EU/DL (ref 0.2–1)

## 2018-03-08 PROCEDURE — 99212 OFFICE O/P EST SF 10 MIN: CPT | Mod: 25 | Performed by: UROLOGY

## 2018-03-08 PROCEDURE — 52000 CYSTOURETHROSCOPY: CPT | Performed by: UROLOGY

## 2018-03-08 PROCEDURE — 81003 URINALYSIS AUTO W/O SCOPE: CPT | Mod: QW | Performed by: UROLOGY

## 2018-03-08 RX ORDER — FUROSEMIDE 20 MG
TABLET ORAL
Refills: 2 | COMMUNITY
Start: 2018-02-22 | End: 2018-08-24 | Stop reason: DRUGHIGH

## 2018-03-08 RX ORDER — ALLOPURINOL 100 MG/1
TABLET ORAL
Refills: 1 | COMMUNITY
Start: 2017-08-31 | End: 2018-09-05

## 2018-03-08 ASSESSMENT — PAIN SCALES - GENERAL: PAINLEVEL: MILD PAIN (2)

## 2018-03-08 NOTE — PROGRESS NOTES
Marie Kline returns today for office cystoscopy. She was diagnosed with superficial bladder cancer several years ago. She has had no recurrence since February 2016. She's had no recent dysuria or hematuria  Other past medical history: Recent upper respiratory infection, cardiomyopathy, CLL, congestive heart failure, COPD, hypothyroidism, coronary artery disease, pulmonary edema, tricuspid valve disorder-nonrheumatic, former smoker  Medications: Albuterol, allopurinol, Lipitor, Prolene, iron sulfate, elliptical inhaler, Lasix, Synthroid, metoprolol, multivitamin, trazodone  Allergies: IV contrast  Urinalysis: Normal  Exam: Normal appearance, normal vital signs, with daughter. Alert and oriented, normocephalic, normal respirations, neuro grossly intact. Normal external genitalia and urethral meatus  Flexible cystoscopy-normal urethra and bladder mucosa, no papillary tumors or raised erythema. 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 Follow-up with me late in October 2018

## 2018-03-08 NOTE — NURSING NOTE
Prior to the start of the procedure and with procedural staff participation, I verbally confirmed the patient s identity using two indicators, relevant allergies, that the procedure was appropriate and matched the consent or emergent situation, and that the correct equipment/implants were available. Immediately prior to starting the procedure I conducted the Time Out with the procedural staff and re-confirmed the patient s name, procedure, and site/side. (The Joint Commission universal protocol was followed.)  Yes    Sedation (Moderate or Deep): None Berna James LPN

## 2018-03-08 NOTE — LETTER
3/8/2018       RE: Marie Kline  [address]  [place] [address]     Dear Colleague,    Thank you for referring your patient, Marie Kline, to the [facility] [place] at [facility]. Please see a copy of my visit note below.    Marie Kline returns today for office cystoscopy. She was diagnosed with superficial bladder cancer several years ago. She has had no recurrence since February 2016. She's had no recent dysuria or hematuria  Other past medical history: Recent upper respiratory infection, cardiomyopathy, CLL, congestive heart failure, COPD, hypothyroidism, coronary artery disease, pulmonary edema, tricuspid valve disorder-nonrheumatic, former smoker  Medications: Albuterol, allopurinol, Lipitor, Prolene, iron sulfate, elliptical inhaler, Lasix, Synthroid, metoprolol, multivitamin, trazodone  Allergies: IV contrast  Urinalysis: Normal  Exam: Normal appearance, normal vital signs, with daughter. Alert and oriented, normocephalic, normal respirations, neuro grossly intact. Normal external genitalia and urethral meatus  Flexible cystoscopy-normal urethra and bladder mucosa, no papillary tumors or raised erythema. 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 Follow-up with me late in October 2018    Again, thank you for allowing me to participate in the care of your patient.      Sincerely,    Kar Nuñez MD

## 2018-03-08 NOTE — MR AVS SNAPSHOT
"              After Visit Summary   3/8/2018    Marie Kline    MRN: [id number]           Patient Information     Date Of Birth          4/28/1932        Visit Information        Provider Department      3/8/2018 9:30 AM Kar Nuñez MD; UB CYF [facility]        Today's Diagnoses     Personal history of malignant neoplasm of bladder          Care Instructions         AFTER YOUR CYSTOSCOPY         You have just completed a cystoscopy, or \"cysto\", which allowed your physician to learn more about your bladder (or to remove a stent placed after surgery). We suggest that you continue to avoid caffeine, fruit juice, and alcohol for the next 24 hours, however, you are encouraged to return to your normal activities.       A few things that are considered normal after your cystoscopy:    * small amount of bleeding (or spotting) that clears within the next 24 hours    * slight burning sensation with urination    * sensation to of needing to avoid more frequently    * the feeling of \"air\" in your urine    * mild discomfort that is relieved with Acetaminophen (Example:Tylenol)        Please contact our office promptly if you:    * develop a fever above 101 degrees    * are unable to urinate, during non-office hours seek Medical Attention.             Follow-ups after your visit        Who to contact     If you have questions or need follow up information about today's clinic visit or your schedule please contact [facility] [place] directly at [phone].  Normal or non-critical lab and imaging results will be communicated to you by MyChart, letter or phone within 4 business days after the clinic has received the results. If you do not hear from us within 7 days, please contact the clinic through MyChart or phone. If you have a critical or abnormal lab result, we will notify you by phone as soon as possible.  Submit refill requests " "through [employer] or call your pharmacy and they will forward the refill request to us. Please allow 3 business days for your refill to be completed.          Additional Information About Your Visit        [employer] Information     [employer] gives you secure access to your electronic health record. If you see a primary care provider, you can also send messages to your care team and make appointments. If you have questions, please call your primary care clinic.  If you do not have a primary care provider, please call [phone] and they will assist you.        Care EveryWhere ID     This is your Care EveryWhere ID. This could be used by other organizations to access your [place] medical records  [id number]        Your Vitals Were     Pulse Height BMI (Body Mass Index)             80 1.499 m (4' 11\") 21.21 kg/m2          Blood Pressure from Last 3 Encounters:   03/08/18 104/68   02/12/18 120/70   02/03/18 139/71    Weight from Last 3 Encounters:   03/08/18 47.6 kg (105 lb)   02/12/18 47.2 kg (104 lb)   01/08/18 48.4 kg (106 lb 9.6 oz)              We Performed the Following     CYSTOURETHROSCOPY     UA without Microscopic          Today's Medication Changes          These changes are accurate as of 3/8/18 10:02 AM.  If you have any questions, ask your nurse or doctor.               These medicines have changed or have updated prescriptions.        Dose/Directions    traZODone 50 MG tablet   Commonly known as:  DESYREL   This may have changed:  how much to take   Used for:  Insomnia, unspecified type        Dose:  50 mg   Take 1 tablet (50 mg) by mouth nightly as needed for sleep   Quantity:  30 tablet   Refills:  0                Primary Care Provider Office Phone # Fax #    Piper Kiser [phone]       [address] [facility] [address]        Equal Access to Services     RONNA ROBBINS AH: Claudia Cast, ladonna clayton, eduard knight, zoe garzon " claudine hyatt ah. So [facility] [place] [phone].    ATENCIÓN: Si bobo starkey, tiene a eaton disposición servicios gratuitos de asistencia lingüística. Carol cuellar [phone].    We comply with applicable federal civil rights laws and [place] laws. We do not discriminate on the basis of race, color, national origin, age, disability, sex, sexual orientation, or gender identity.            Thank you!     Thank you for choosing [facility] [place]  for your care. Our goal is always to provide you with excellent care. Hearing back from our patients is one way we can continue to improve our services. Please take a few minutes to complete the written survey that you may receive in the mail after your visit with us. Thank you!             Your Updated Medication List - Protect others around you: Learn how to safely use, store and throw away your medicines at www.disposemymeds.org.          This list is accurate as of 3/8/18 10:02 AM.  Always use your most recent med list.                   Brand Name Dispense Instructions for use Diagnosis    * albuterol (2.5 MG/3ML) 0.083% neb solution      Take 1 vial by nebulization 2 times daily        * albuterol 108 (90 BASE) MCG/ACT Inhaler    PROAIR HFA/PROVENTIL HFA/VENTOLIN HFA    1 Inhaler    Inhale 2 puffs into the lungs every 6 hours as needed for wheezing    Intermittent asthma without complication       allopurinol 100 MG tablet    ZYLOPRIM          atorvastatin 20 MG tablet    LIPITOR    30 tablet    Take 1 tablet (20 mg) by mouth daily    Hyperlipidemia with target LDL less than 130       * denosumab 60 MG/ML Soln injection    PROLIA     Inject 60 mg Subcutaneous every 6 months        * denosumab 60 MG/ML Soln injection    PROLIA    1 mL    Inject 1 mL (60 mg) Subcutaneous every 6 months    Senile osteoporosis, CKD (chronic kidney disease) stage 3, GFR 30-59 ml/min       ferrous sulfate 325 (65 FE) MG tablet    IRON     Take by mouth daily (with  breakfast)        * FIRST-HANS MOUTHWASH Susp     237 mL    Swish and swallow 5-10 mLs in mouth every 6 hours as needed    Thrush       * FIRST-HANS MOUTHWASH Susp     237 mL    Swish and swallow 5-10 mLs in mouth every 6 hours as needed    Thrush       fluticasone-vilanterol 200-25 MCG/INH oral inhaler    BREO ELLIPTA    3 Inhaler    Inhale 1 puff into the lungs daily    COPD exacerbation (H)       furosemide 20 MG tablet    LASIX          levothyroxine 75 MCG tablet    SYNTHROID/LEVOTHROID    90 tablet    Take 1 tablet (75 mcg) by mouth daily    Other specified hypothyroidism       metoprolol succinate 25 MG 24 hr tablet    TOPROL-XL    90 tablet    Take 1 tablet (25 mg) by mouth daily    ACS (acute coronary syndrome) (H)       MULTIVITAMIN GUMMIES ADULT PO           traZODone 50 MG tablet    DESYREL    30 tablet    Take 1 tablet (50 mg) by mouth nightly as needed for sleep    Insomnia, unspecified type       * Notice:  This list has 6 medication(s) that are the same as other medications prescribed for you. Read the directions carefully, and ask your doctor or other care provider to review them with you.

## 2018-03-09 NOTE — TELEPHONE ENCOUNTER
Received fax from [employer] with Summary of Benefits.   Medicare: benefits subjet to a $183 deductible ($183 met) and 20% co-insurance for the administration and cost of Prolia.   Supplemental BCBS insurance: covers the Medicare Part B deductible, co-insurance and 100% of the excess charges. Claims will automatically cross over.    Summary of Benefits sent to be scanned.    Contacted pt, she needs to check with her daughter to schedule labs and Prolia injection.

## 2018-03-12 DIAGNOSIS — E78.5 HYPERLIPIDEMIA LDL GOAL <100: ICD-10-CM

## 2018-03-12 DIAGNOSIS — N18.30 CKD (CHRONIC KIDNEY DISEASE) STAGE 3, GFR 30-59 ML/MIN (H): ICD-10-CM

## 2018-03-12 DIAGNOSIS — M81.0 SENILE OSTEOPOROSIS: ICD-10-CM

## 2018-03-12 DIAGNOSIS — E03.8 OTHER SPECIFIED HYPOTHYROIDISM: ICD-10-CM

## 2018-03-12 PROCEDURE — 84439 ASSAY OF FREE THYROXINE: CPT | Performed by: INTERNAL MEDICINE

## 2018-03-12 PROCEDURE — 84443 ASSAY THYROID STIM HORMONE: CPT | Performed by: INTERNAL MEDICINE

## 2018-03-12 PROCEDURE — 84100 ASSAY OF PHOSPHORUS: CPT | Performed by: INTERNAL MEDICINE

## 2018-03-12 PROCEDURE — 36415 COLL VENOUS BLD VENIPUNCTURE: CPT | Performed by: INTERNAL MEDICINE

## 2018-03-12 PROCEDURE — 80048 BASIC METABOLIC PNL TOTAL CA: CPT | Performed by: INTERNAL MEDICINE

## 2018-03-12 PROCEDURE — 83735 ASSAY OF MAGNESIUM: CPT | Performed by: INTERNAL MEDICINE

## 2018-03-12 PROCEDURE — 80061 LIPID PANEL: CPT | Performed by: INTERNAL MEDICINE

## 2018-03-12 RX ORDER — BUDESONIDE AND FORMOTEROL FUMARATE DIHYDRATE 80; 4.5 UG/1; UG/1
2 AEROSOL RESPIRATORY (INHALATION) 2 TIMES DAILY
Qty: 3 INHALER | Refills: 1 | Status: SHIPPED | OUTPATIENT
Start: 2018-03-12 | End: 2019-04-16 | Stop reason: SINTOL

## 2018-03-12 NOTE — TELEPHONE ENCOUNTER
Contacted pt, she is okay trying Symbicort.     Once ordered, please forward message back to Anna Lewis with Prescription Assistance program for enrollment in Symbicort program.

## 2018-03-12 NOTE — TELEPHONE ENCOUNTER
Forwarded to [facility] with Prescription Assistance program to enroll pt in Symbicort program.

## 2018-03-13 LAB
ANION GAP SERPL CALCULATED.3IONS-SCNC: 3 MMOL/L (ref 3–14)
BUN SERPL-MCNC: 9 MG/DL (ref 7–30)
CALCIUM SERPL-MCNC: 9.2 MG/DL (ref 8.5–10.1)
CHLORIDE SERPL-SCNC: 103 MMOL/L (ref 94–109)
CHOLEST SERPL-MCNC: 151 MG/DL
CO2 SERPL-SCNC: 33 MMOL/L (ref 20–32)
CREAT SERPL-MCNC: 1.16 MG/DL (ref 0.52–1.04)
GFR SERPL CREATININE-BSD FRML MDRD: 44 ML/MIN/1.7M2
GLUCOSE SERPL-MCNC: 93 MG/DL (ref 70–99)
HDLC SERPL-MCNC: 47 MG/DL
LDLC SERPL CALC-MCNC: 84 MG/DL
MAGNESIUM SERPL-MCNC: 2.1 MG/DL (ref 1.6–2.3)
NONHDLC SERPL-MCNC: 104 MG/DL
PHOSPHATE SERPL-MCNC: 3.9 MG/DL (ref 2.5–4.5)
POTASSIUM SERPL-SCNC: 4.5 MMOL/L (ref 3.4–5.3)
SODIUM SERPL-SCNC: 139 MMOL/L (ref 133–144)
T4 FREE SERPL-MCNC: 1.29 NG/DL (ref 0.76–1.46)
TRIGL SERPL-MCNC: 98 MG/DL
TSH SERPL DL<=0.005 MIU/L-ACNC: 7.5 MU/L (ref 0.4–4)

## 2018-03-20 NOTE — TELEPHONE ENCOUNTER
"Per 03/12 result note: \"The magnesium and phosphorus are normal.\" and Calcium is in normal range.    Pt has appt for Prolia injection tomorrow AM.  "

## 2018-03-21 ENCOUNTER — ALLIED HEALTH/NURSE VISIT (OUTPATIENT)
Dept: NURSING | Facility: CLINIC | Age: 83
End: 2018-03-21
Payer: MEDICARE

## 2018-03-21 ENCOUNTER — TELEPHONE (OUTPATIENT)
Dept: INTERNAL MEDICINE | Facility: CLINIC | Age: 83
End: 2018-03-21

## 2018-03-21 DIAGNOSIS — N18.30 CKD (CHRONIC KIDNEY DISEASE) STAGE 3, GFR 30-59 ML/MIN (H): ICD-10-CM

## 2018-03-21 DIAGNOSIS — M81.0 SENILE OSTEOPOROSIS: ICD-10-CM

## 2018-03-21 PROCEDURE — 96372 THER/PROPH/DIAG INJ SC/IM: CPT

## 2018-03-21 PROCEDURE — 99207 ZZC NO CHARGE NURSE ONLY: CPT

## 2018-03-21 NOTE — TELEPHONE ENCOUNTER
Pt came in for nurse only appt. Had PA paperwork for Symbicort. Pt also reports she spoke to Vandana ([phone]) from Prescription Assistance Program yest who stated she'd attempt to get  coverage. Discuss with pt will let Prescription Assistance Program complete their work. If  coverage not received then will discuss possible PA.

## 2018-03-21 NOTE — TELEPHONE ENCOUNTER
"Pt come in for nurse only appt. Has question about an alert on mychart that indicates she's due for \"Heart Failure Action Plan\". This RN is unsure what type of assessment this is. Please advise what pt should be doing to f/u regarding this, thanks.  "

## 2018-03-21 NOTE — NURSING NOTE
Discussed with PCP re: CKD d/t denosumab instruction to discuss with provider if kidney disease. PCP okay with Prolia injection being given.    Indication: Prolia  (denosumab) is a prescription medicine used to treat osteoporosis in patients who:     Are at high risk for fracture, meaning patients who have had a fracture related to osteoporosis, or who have multiple risk factors for fracture     Cannot use another osteoporosis medicine or other osteoporosis medicines did not work well   The timeline for early/late injections would be 4 weeks early and any time after the 6 month manisha. If a patient receives their injection late, then the subsequent injection would be 6 months from the date that they actually received the injection    1.  When was the last injection?  05/12/17  2.  Did they check with their insurance for this calendar year?  Clinic RN used [employer] and verified coverage  3.  Is there an order in the chart?  Yes  4.  Has the patient had dental work involving the bone in the past month or will have work in the next 6 months?  No, pt has appt with dentist coming up for routine care. Informed pt to notify dentist of Prolia at her appt. Verbalized understanding. Denies tooth pain.    5.  Did you have the patient wait 15 minutes after the injection?  Yes  6.  Remember to use .injection under the medication notes    The following steps were completed to comply with the REMS program for Prolia:    Reviewed information in the Medication Guide and Patient Counseling Chart, including the serious risks of Prolia  and the symptoms of each risk.    Advised patient to seek prompt medical attention if they have signs or symptoms of any of the serious risks.  Provided each patient a copy of the Medication Guide and Patient Brochure.

## 2018-03-21 NOTE — MR AVS SNAPSHOT
After Visit Summary   3/21/2018    Marie Kline    MRN: [id number]           Patient Information     Date Of Birth          4/28/1932        Visit Information        Provider Department      3/21/2018 10:00 AM Rn, Ri [facility]        Today's Diagnoses     Senile osteoporosis        CKD (chronic kidney disease) stage 3, GFR 30-59 ml/min           Follow-ups after your visit        Your next 10 appointments already scheduled     Oct 11, 2018 11:00 AM CDT   Cystoscopy with Kar Nuñez MD, UB CYF   [facility] [place] ([facility])    [address]  [address]  [facility] [address] [phone]              Who to contact     If you have questions or need follow up information about today's clinic visit or your schedule please contact [facility] directly at [phone].  Normal or non-critical lab and imaging results will be communicated to you by MyChart, letter or phone within 4 business days after the clinic has received the results. If you do not hear from us within 7 days, please contact the clinic through MyChart or phone. If you have a critical or abnormal lab result, we will notify you by phone as soon as possible.  Submit refill requests through [employer] or call your pharmacy and they will forward the refill request to us. Please allow 3 business days for your refill to be completed.          Additional Information About Your Visit        MyChart Information     [employer] gives you secure access to your electronic health record. If you see a primary care provider, you can also send messages to your care team and make appointments. If you have questions, please call your primary care clinic.  If you do not have a primary care provider, please call [phone] and they will assist you.        Care EveryWhere ID     This is your Care EveryWhere ID. This could be used by other organizations to  access your [place] medical records  [id number]         Blood Pressure from Last 3 Encounters:   03/08/18 104/68   02/12/18 120/70   02/03/18 139/71    Weight from Last 3 Encounters:   03/08/18 105 lb (47.6 kg)   02/12/18 104 lb (47.2 kg)   01/08/18 106 lb 9.6 oz (48.4 kg)              We Performed the Following     C DENOSUMAB INJECTION, 1 MG          Today's Medication Changes          These changes are accurate as of 3/21/18 11:11 AM.  If you have any questions, ask your nurse or doctor.               These medicines have changed or have updated prescriptions.        Dose/Directions    traZODone 50 MG tablet   Commonly known as:  DESYREL   This may have changed:  how much to take   Used for:  Insomnia, unspecified type        Dose:  50 mg   Take 1 tablet (50 mg) by mouth nightly as needed for sleep   Quantity:  30 tablet   Refills:  0                Primary Care Provider Office Phone # Fax #    Piper Kiser [phone]       [address] [facility] [address]        Equal Access to Services     [facility]: Hadii juan warner hadasho Soabelardo, waaxda luqzulema, qaybta kaaljohnathan knight, zoe hyatt . So [facility] [phone].    ATENCIÓN: Si habla español, tiene a eaton disposición servicios gratuitos de asistencia lingüística. Carol al [phone].    We comply with applicable federal civil rights laws and [place] laws. We do not discriminate on the basis of race, color, national origin, age, disability, sex, sexual orientation, or gender identity.            Thank you!     Thank you for choosing [facility]  for your care. Our goal is always to provide you with excellent care. Hearing back from our patients is one way we can continue to improve our services. Please take a few minutes to complete the written survey that you may receive in the mail after your visit with us. Thank you!             Your Updated Medication List - Protect others  around you: Learn how to safely use, store and throw away your medicines at www.disposemymeds.org.          This list is accurate as of 3/21/18 11:11 AM.  Always use your most recent med list.                   Brand Name Dispense Instructions for use Diagnosis    * albuterol (2.5 MG/3ML) 0.083% neb solution      Take 1 vial by nebulization 2 times daily        * albuterol 108 (90 BASE) MCG/ACT Inhaler    PROAIR HFA/PROVENTIL HFA/VENTOLIN HFA    1 Inhaler    Inhale 2 puffs into the lungs every 6 hours as needed for wheezing    Intermittent asthma without complication       allopurinol 100 MG tablet    ZYLOPRIM          atorvastatin 20 MG tablet    LIPITOR    30 tablet    Take 1 tablet (20 mg) by mouth daily    Hyperlipidemia with target LDL less than 130       budesonide-formoterol 80-4.5 MCG/ACT Inhaler    SYMBICORT    3 Inhaler    Inhale 2 puffs into the lungs 2 times daily    COPD exacerbation (H)       * denosumab 60 MG/ML Soln injection    PROLIA     Inject 60 mg Subcutaneous every 6 months        * denosumab 60 MG/ML Soln injection    PROLIA    1 mL    Inject 1 mL (60 mg) Subcutaneous every 6 months    Senile osteoporosis, CKD (chronic kidney disease) stage 3, GFR 30-59 ml/min       ferrous sulfate 325 (65 FE) MG tablet    IRON     Take by mouth daily (with breakfast)        * FIRST-HANS MOUTHWASH Susp     237 mL    Swish and swallow 5-10 mLs in mouth every 6 hours as needed    Thrush       * FIRST-HANS MOUTHWASH Susp     237 mL    Swish and swallow 5-10 mLs in mouth every 6 hours as needed    Thrush       fluticasone-vilanterol 200-25 MCG/INH oral inhaler    BREO ELLIPTA    3 Inhaler    Inhale 1 puff into the lungs daily    COPD exacerbation (H)       furosemide 20 MG tablet    LASIX          levothyroxine 75 MCG tablet    SYNTHROID/LEVOTHROID    90 tablet    Take 1 tablet (75 mcg) by mouth daily    Other specified hypothyroidism       metoprolol succinate 25 MG 24 hr tablet    TOPROL-XL    90 tablet     Take 1 tablet (25 mg) by mouth daily    ACS (acute coronary syndrome) (H)       MULTIVITAMIN GUMMIES ADULT PO           traZODone 50 MG tablet    DESYREL    30 tablet    Take 1 tablet (50 mg) by mouth nightly as needed for sleep    Insomnia, unspecified type       * Notice:  This list has 6 medication(s) that are the same as other medications prescribed for you. Read the directions carefully, and ask your doctor or other care provider to review them with you.

## 2018-03-25 NOTE — TELEPHONE ENCOUNTER
It includes lab work and possibly adding another blood pressure medication.     Hers was stress-induced cardiomyopathy.    Most recent echocardiogram revealed a normal ejection fraction- heart was pumping normally.    So no further assessment needed currently

## 2018-04-09 DIAGNOSIS — E78.5 HYPERLIPIDEMIA WITH TARGET LDL LESS THAN 130: ICD-10-CM

## 2018-04-09 DIAGNOSIS — I24.9 ACS (ACUTE CORONARY SYNDROME) (H): ICD-10-CM

## 2018-04-10 ENCOUNTER — MYC MEDICAL ADVICE (OUTPATIENT)
Dept: PHARMACY | Facility: CLINIC | Age: 83
End: 2018-04-10

## 2018-04-10 RX ORDER — ATORVASTATIN CALCIUM 20 MG/1
20 TABLET, FILM COATED ORAL DAILY
Qty: 90 TABLET | Refills: 1 | Status: SHIPPED | OUTPATIENT
Start: 2018-04-10 | End: 2018-10-17

## 2018-04-10 NOTE — TELEPHONE ENCOUNTER
"Requested Prescriptions   Pending Prescriptions Disp Refills     atorvastatin (LIPITOR) 20 MG tablet [Pharmacy Med Name: Atorvastatin Calcium Oral Tablet 20 MG] 90 tablet 0     Sig: TAKE ONE TABLET BY MOUTH ONE TIME DAILY NEEDS LABS BEFORE NEXT REFILL    Statins Protocol Passed    4/9/2018  4:39 PM       Passed - LDL on file in past 12 months    Recent Labs   Lab Test  03/12/18   1009   LDL  84            Passed - No abnormal creatine kinase in past 12 months    No lab results found.            Passed - Recent (12 mo) or future (30 days) visit within the authorizing provider's specialty    Patient had office visit in the last 12 months or has a visit in the next 30 days with authorizing provider or within the authorizing provider's specialty.  See \"Patient Info\" tab in inbasket, or \"Choose Columns\" in Meds & Orders section of the refill encounter.           Passed - Patient is age 18 or older       Passed - No active pregnancy on record       Passed - No positive pregnancy test in past 12 months        Prescription approved per [facility] [place] [facility].    "

## 2018-04-10 NOTE — TELEPHONE ENCOUNTER
Sent [employer] message to patient - due for MTM f/u appt.    Chandler RochaD  Pharmaceutical Care Resident   Pager: [phone]

## 2018-04-23 ENCOUNTER — TRANSFERRED RECORDS (OUTPATIENT)
Dept: HEALTH INFORMATION MANAGEMENT | Facility: CLINIC | Age: 83
End: 2018-04-23

## 2018-04-24 ENCOUNTER — OFFICE VISIT (OUTPATIENT)
Dept: INTERNAL MEDICINE | Facility: CLINIC | Age: 83
End: 2018-04-24
Payer: MEDICARE

## 2018-04-24 VITALS
SYSTOLIC BLOOD PRESSURE: 106 MMHG | DIASTOLIC BLOOD PRESSURE: 58 MMHG | TEMPERATURE: 98.7 F | WEIGHT: 106.4 LBS | BODY MASS INDEX: 21.45 KG/M2 | OXYGEN SATURATION: 90 % | HEART RATE: 79 BPM | HEIGHT: 59 IN

## 2018-04-24 DIAGNOSIS — E03.8 OTHER SPECIFIED HYPOTHYROIDISM: ICD-10-CM

## 2018-04-24 DIAGNOSIS — M25.552 HIP PAIN, LEFT: ICD-10-CM

## 2018-04-24 DIAGNOSIS — J43.8 OTHER EMPHYSEMA (H): ICD-10-CM

## 2018-04-24 DIAGNOSIS — C67.9 MALIGNANT NEOPLASM OF URINARY BLADDER, UNSPECIFIED SITE (H): ICD-10-CM

## 2018-04-24 DIAGNOSIS — C91.10 CLL (CHRONIC LYMPHOCYTIC LEUKEMIA) (H): ICD-10-CM

## 2018-04-24 DIAGNOSIS — N18.30 CKD (CHRONIC KIDNEY DISEASE) STAGE 3, GFR 30-59 ML/MIN (H): Primary | ICD-10-CM

## 2018-04-24 PROCEDURE — 99214 OFFICE O/P EST MOD 30 MIN: CPT | Performed by: INTERNAL MEDICINE

## 2018-04-24 RX ORDER — GABAPENTIN 300 MG/1
300 CAPSULE ORAL DAILY PRN
Qty: 90 CAPSULE | Refills: 0 | COMMUNITY
Start: 2018-04-24 | End: 2018-05-17

## 2018-04-24 NOTE — NURSING NOTE
"Chief Complaint   Patient presents with     Recheck Medication       Initial /58 (BP Location: Left arm, Patient Position: Chair, Cuff Size: Adult Regular)  Pulse 79  Temp 98.7  F (37.1  C) (Oral)  Ht 4' 11\" (1.499 m)  Wt 106 lb 6.4 oz (48.3 kg)  SpO2 90%  Breastfeeding? No  BMI 21.49 kg/m2 Estimated body mass index is 21.49 kg/(m^2) as calculated from the following:    Height as of this encounter: 4' 11\" (1.499 m).    Weight as of this encounter: 106 lb 6.4 oz (48.3 kg).  Medication Reconciliation: complete     PATRICIA Bell MA      "

## 2018-04-24 NOTE — PROGRESS NOTES
SUBJECTIVE:   Marie Kline is a 85 year old female who presents to clinic today for the following health issues:    She had a [employer] advisor through her insurance where paper advice was given to patient.  The patient has switched her statin to the morning, as this can interact with trazodone.     Hyperlipidemia Follow-Up      Rate your low fat/cholesterol diet?: not monitoring fat    Taking statin?  Yes, no muscle aches from statin    Other lipid medications/supplements?:  none    COPD Follow-Up    Symptoms are currently: IS the same, it depends on the weather.    Current fatigue or dyspnea with ambulation: Feel just the same.    Shortness of breath: stable    Increased or change in Cough/Sputum: Yes    Fever(s): No    Baseline ambulation without stopping to rest:  No. Able to walk up Yes. flights of stairs without stopping to rest.    Any ER/UC or hospital admissions since your last visit? No     History   Smoking Status     Former Smoker     Types: Cigarettes     Quit date: 2/3/1996   Smokeless Tobacco     Never Used     No results found for: FEV1, YWY3OCP    Chronic Kidney Disease Follow-up      Current NSAID use?  No    Hypothyroidism Follow-up  TSH   Date Value Ref Range Status   03/12/2018 7.50 (H) 0.40 - 4.00 mU/L Final   free T4 of 1.29      Since last visit, patient describes the following symptoms: weight loss  lbs and dry skin    Dr. Nuñez for urology. 3/2018 note stated no recurrence since 2016      Amount of exercise or physical activity: None    Problems taking medications regularly: No    Medication side effects: none    Diet: regular (no restrictions)        Problem list and histories reviewed & adjusted, as indicated.      Reviewed and updated as needed this visit by clinical staff  Tobacco  Allergies  Meds  Med Hx  Surg Hx  Fam Hx  Soc Hx      Reviewed and updated as needed this visit by Provider         ROS:  CONSTITUTIONAL: NEGATIVE for fever, chills, change in weight  RESP: NEGATIVE  "for significant cough or SOB  CV: NEGATIVE for chest pain, palpitations or peripheral edema  MSK: hip pain     OBJECTIVE:     /58 (BP Location: Left arm, Patient Position: Chair, Cuff Size: Adult Regular)  Pulse 79  Temp 98.7  F (37.1  C) (Oral)  Ht 4' 11\" (1.499 m)  Wt 106 lb 6.4 oz (48.3 kg)  SpO2 90%  Breastfeeding? No  BMI 21.49 kg/m2  Body mass index is 21.49 kg/(m^2).  GENERAL: healthy, alert and no distress  RESP: lungs clear to auscultation - no rales, rhonchi or wheezes  CV: regular rate and rhythm, normal S1 S2, no S3 or S4, no murmur, click or rub    ASSESSMENT/PLAN:       (N18.3) CKD (chronic kidney disease) stage 3, GFR 30-59 ml/min  (primary encounter diagnosis)  Comment: stable  Plan: keep bp under good control and avoid NSAIDs    (C91.10) CLL (chronic lymphocytic leukemia) (HCC)  Comment:   Plan: followed by oncology    (C67.9) Malignant neoplasm of urinary bladder, unspecified site (H)  Comment: no recurrence since 2016  Plan: followed by Dr. Nuñez    (M25.552) Hip pain, left  Comment:   Plan: gabapentin (NEURONTIN) 300 MG capsule    (J43.8) Other emphysema (H)  Comment:stable  Plan: inhalers      (E03.8) Other specified hypothyroidism  Comment:   Plan: levothyroxine        Piper Kiesr MD  [facility]    "

## 2018-04-24 NOTE — MR AVS SNAPSHOT
After Visit Summary   4/24/2018    Marie Kline    MRN: [id number]           Patient Information     Date Of Birth          4/28/1932        Visit Information        Provider Department      4/24/2018 2:00 PM Piper Kiser MD [facility]        Today's Diagnoses     CKD (chronic kidney disease) stage 3, GFR 30-59 ml/min    -  1    CLL (chronic lymphocytic leukemia) (HCC)        Malignant neoplasm of urinary bladder, unspecified site (H)        Hip pain, left        Other emphysema (H)        Other specified hypothyroidism           Follow-ups after your visit        Your next 10 appointments already scheduled     Oct 11, 2018 11:00 AM CDT   Cystoscopy with Kar Nuñez MD, UB CYF   [facility] ([facility])    [address]  [address]  [facility] [address] [phone]              Who to contact     If you have questions or need follow up information about today's clinic visit or your schedule please contact [facility] directly at [phone].  Normal or non-critical lab and imaging results will be communicated to you by MyChart, letter or phone within 4 business days after the clinic has received the results. If you do not hear from us within 7 days, please contact the clinic through [employer] or phone. If you have a critical or abnormal lab result, we will notify you by phone as soon as possible.  Submit refill requests through [employer] or call your pharmacy and they will forward the refill request to us. Please allow 3 business days for your refill to be completed.          Additional Information About Your Visit        MyChart Information     [employer] gives you secure access to your electronic health record. If you see a primary care provider, you can also send messages to your care team and make appointments. If you have questions, please call your primary care clinic.  If you do  "not have a primary care provider, please call [phone] and they will assist you.        Care EveryWhere ID     This is your Care EveryWhere ID. This could be used by other organizations to access your [facility] medical records  [id number]        Your Vitals Were     Pulse Temperature Height Pulse Oximetry Breastfeeding? BMI (Body Mass Index)    79 98.7  F (37.1  C) (Oral) 4' 11\" (1.499 m) 90% No 21.49 kg/m2       Blood Pressure from Last 3 Encounters:   04/24/18 106/58   03/08/18 104/68   02/12/18 120/70    Weight from Last 3 Encounters:   04/24/18 106 lb 6.4 oz (48.3 kg)   03/08/18 105 lb (47.6 kg)   02/12/18 104 lb (47.2 kg)              Today, you had the following     No orders found for display         Today's Medication Changes          These changes are accurate as of 4/24/18 11:59 PM.  If you have any questions, ask your nurse or doctor.               These medicines have changed or have updated prescriptions.        Dose/Directions    traZODone 50 MG tablet   Commonly known as:  DESYREL   This may have changed:  how much to take   Used for:  Insomnia, unspecified type        Dose:  50 mg   Take 1 tablet (50 mg) by mouth nightly as needed for sleep   Quantity:  30 tablet   Refills:  0                Primary Care Provider Office Phone # Fax #    Piper Catracho Kiser [phone]       [address] [place] [address]        Equal Access to Services     SHIRAZ ROBBINS AH: Hadii juan leeo Soabelardo, waaxda luqadaha, qaybta kaalmada adeegyada, waxdenilson jessica sykes. So [facility] [phone].    ATENCIÓN: Si habla español, tiene a eaton disposición servicios gratuitos de asistencia lingüística. Llame al [phone].    We comply with applicable federal civil rights laws and [place] laws. We do not discriminate on the basis of race, color, national origin, age, disability, sex, sexual orientation, or gender identity.            Thank you!     Thank you for choosing " [facility]  for your care. Our goal is always to provide you with excellent care. Hearing back from our patients is one way we can continue to improve our services. Please take a few minutes to complete the written survey that you may receive in the mail after your visit with us. Thank you!             Your Updated Medication List - Protect others around you: Learn how to safely use, store and throw away your medicines at www.disposemymeds.org.          This list is accurate as of 4/24/18 11:59 PM.  Always use your most recent med list.                   Brand Name Dispense Instructions for use Diagnosis    * albuterol (2.5 MG/3ML) 0.083% neb solution      Take 1 vial by nebulization 2 times daily        * albuterol 108 (90 Base) MCG/ACT Inhaler    PROAIR HFA/PROVENTIL HFA/VENTOLIN HFA    1 Inhaler    Inhale 2 puffs into the lungs every 6 hours as needed for wheezing    Intermittent asthma without complication       allopurinol 100 MG tablet    ZYLOPRIM          atorvastatin 20 MG tablet    LIPITOR    90 tablet    Take 1 tablet (20 mg) by mouth daily    ACS (acute coronary syndrome) (H), Hyperlipidemia with target LDL less than 130       budesonide-formoterol 80-4.5 MCG/ACT Inhaler    SYMBICORT    3 Inhaler    Inhale 2 puffs into the lungs 2 times daily    COPD exacerbation (H)       * denosumab 60 MG/ML Soln injection    PROLIA     Inject 60 mg Subcutaneous every 6 months        * denosumab 60 MG/ML Soln injection    PROLIA    1 mL    Inject 1 mL (60 mg) Subcutaneous every 6 months    Senile osteoporosis, CKD (chronic kidney disease) stage 3, GFR 30-59 ml/min       ferrous sulfate 325 (65 Fe) MG tablet    IRON     Take by mouth daily (with breakfast)        FIRST-HANS MOUTHWASH Susp     237 mL    Swish and swallow 5-10 mLs in mouth every 6 hours as needed    Thrush       fluticasone-vilanterol 200-25 MCG/INH oral inhaler    BREO ELLIPTA    3 Inhaler    Inhale 1 puff into the lungs daily    COPD  exacerbation (H)       furosemide 20 MG tablet    LASIX          gabapentin 300 MG capsule    NEURONTIN    90 capsule    Take 1 capsule (300 mg) by mouth daily as needed    Hip pain, left       levothyroxine 75 MCG tablet    SYNTHROID/LEVOTHROID    90 tablet    Take 1 tablet (75 mcg) by mouth daily    Other specified hypothyroidism       MULTIVITAMIN GUMMIES ADULT PO           traZODone 50 MG tablet    DESYREL    30 tablet    Take 1 tablet (50 mg) by mouth nightly as needed for sleep    Insomnia, unspecified type       * Notice:  This list has 4 medication(s) that are the same as other medications prescribed for you. Read the directions carefully, and ask your doctor or other care provider to review them with you.

## 2018-04-26 DIAGNOSIS — I24.9 ACS (ACUTE CORONARY SYNDROME) (H): ICD-10-CM

## 2018-04-26 RX ORDER — METOPROLOL SUCCINATE 25 MG/1
25 TABLET, EXTENDED RELEASE ORAL DAILY
Qty: 90 TABLET | Refills: 3 | Status: SHIPPED | OUTPATIENT
Start: 2018-04-26 | End: 2019-06-25

## 2018-04-26 NOTE — TELEPHONE ENCOUNTER
"Requested Prescriptions   Pending Prescriptions Disp Refills     metoprolol succinate (TOPROL-XL) 25 MG 24 hr tablet 90 tablet 2     Sig: Take 1 tablet (25 mg) by mouth daily    Beta-Blockers Protocol Passed    4/26/2018 11:12 AM       Passed - Blood pressure under 140/90 in past 12 months    BP Readings from Last 3 Encounters:   04/24/18 106/58   03/08/18 104/68   02/12/18 120/70          Passed - Patient is age 6 or older       Passed - Recent (12 mo) or future (30 days) visit within the authorizing provider's specialty    Patient had office visit in the last 12 months or has a visit in the next 30 days with authorizing provider or within the authorizing provider's specialty.  See \"Patient Info\" tab in inbasket, or \"Choose Columns\" in Meds & Orders section of the refill encounter.    Last OV: 04/24/18        Prescription approved per [facility] Refill Protocol.  "

## 2018-05-02 ENCOUNTER — TELEPHONE (OUTPATIENT)
Dept: INTERNAL MEDICINE | Facility: CLINIC | Age: 83
End: 2018-05-02

## 2018-05-02 NOTE — TELEPHONE ENCOUNTER
MICHELLE Salazar has been approved to the [employer] assistance program for Proventil HFA througjh Dec 2018.  She will receive this medication at no cost for the enrollment period.    A 90 day supply of PROVENTIL HFA will be delivered to Marie's home within 7-10 business days.    Marie will contact my office for refills as we must work directly with the .  I will note EPIC as each refill is requested.    Thanks so much for your help!    Rupali Lewis  Prescription

## 2018-05-07 ENCOUNTER — TELEPHONE (OUTPATIENT)
Dept: ORTHOPEDICS | Facility: CLINIC | Age: 83
End: 2018-05-07

## 2018-05-07 NOTE — TELEPHONE ENCOUNTER
Received message from . Patient is a previous Dr. Hayden patient and would like to have another LEATHA done. Not sure who she should see. She also states she is allergic to contrast dye.     Per chart review, last transforaminal LEATHA done by Dr. Hayden on 9/1/16 with fluoroscopic guidance.     Please advise if patient should be referred to [facility].     ANAHI Judd RN

## 2018-05-07 NOTE — TELEPHONE ENCOUNTER
Given length of time since last visit she would need a re-evaluation prior to ordering an injection. Given the patient chronicity of pain would recommend patient see [facility].    Dinesh Platt, ATC

## 2018-05-07 NOTE — TELEPHONE ENCOUNTER
Phone call to patient and recommended that she contact the [facility]. Number provided. She verbalized understanding.     ANAHI Judd RN

## 2018-05-15 ENCOUNTER — OFFICE VISIT (OUTPATIENT)
Dept: NEUROSURGERY | Facility: CLINIC | Age: 83
End: 2018-05-15
Attending: NURSE PRACTITIONER
Payer: MEDICARE

## 2018-05-15 VITALS — HEIGHT: 59 IN | WEIGHT: 106 LBS | BODY MASS INDEX: 21.37 KG/M2

## 2018-05-15 DIAGNOSIS — M54.16 LUMBAR RADICULAR PAIN: Primary | ICD-10-CM

## 2018-05-15 PROCEDURE — 99203 OFFICE O/P NEW LOW 30 MIN: CPT | Performed by: NURSE PRACTITIONER

## 2018-05-15 PROCEDURE — G0463 HOSPITAL OUTPT CLINIC VISIT: HCPCS | Performed by: NURSE PRACTITIONER

## 2018-05-15 ASSESSMENT — PAIN SCALES - GENERAL: PAINLEVEL: MODERATE PAIN (5)

## 2018-05-15 NOTE — PATIENT INSTRUCTIONS
1. Please call [facility] to have lumbar MRI completed. Call [phone] to schedule your scan.  I will contact you with results.

## 2018-05-15 NOTE — MR AVS SNAPSHOT
After Visit Summary   5/15/2018    Marie Kline    MRN: [id number]           Patient Information     Date Of Birth          4/28/1932        Visit Information        Provider Department      5/15/2018 2:20 PM Ariadna Cassidy APRN CNP [facility]        Today's Diagnoses     Lumbar radicular pain    -  1      Care Instructions    1. Please call [facility] to have lumbar MRI completed. Call [phone] to schedule your scan.  I will contact you with results.             Follow-ups after your visit        Your next 10 appointments already scheduled     Oct 11, 2018 11:00 AM CDT   Cystoscopy with Kar Nuñez MD, UB CYF   [facility] ([facility])    [address]  [address]  [facility] [address] [phone]              Future tests that were ordered for you today     Open Future Orders        Priority Expected Expires Ordered    MR Lumbar Spine w/o & w Contrast Routine  5/15/2019 5/15/2018            Who to contact     If you have questions or need follow up information about today's clinic visit or your schedule please contact [facility] directly at [phone].  Normal or non-critical lab and imaging results will be communicated to you by MyChart, letter or phone within 4 business days after the clinic has received the results. If you do not hear from us within 7 days, please contact the clinic through MyChart or phone. If you have a critical or abnormal lab result, we will notify you by phone as soon as possible.  Submit refill requests through [employer] or call your pharmacy and they will forward the refill request to us. Please allow 3 business days for your refill to be completed.          Additional Information About Your Visit        [employer] Information     [employer] gives you secure access to your electronic health record. If you see a primary care provider,  "you can also send messages to your care team and make appointments. If you have questions, please call your primary care clinic.  If you do not have a primary care provider, please call [phone] and they will assist you.        Care EveryWhere ID     This is your Care EveryWhere ID. This could be used by other organizations to access your [facility] medical records  [id number]        Your Vitals Were     Height BMI (Body Mass Index)                4' 11\" (1.499 m) 21.41 kg/m2           Blood Pressure from Last 3 Encounters:   04/24/18 106/58   03/08/18 104/68   02/12/18 120/70    Weight from Last 3 Encounters:   05/15/18 106 lb (48.1 kg)   04/24/18 106 lb 6.4 oz (48.3 kg)   03/08/18 105 lb (47.6 kg)                 Today's Medication Changes          These changes are accurate as of 5/15/18  2:52 PM.  If you have any questions, ask your nurse or doctor.               These medicines have changed or have updated prescriptions.        Dose/Directions    traZODone 50 MG tablet   Commonly known as:  DESYREL   This may have changed:  how much to take   Used for:  Insomnia, unspecified type        Dose:  50 mg   Take 1 tablet (50 mg) by mouth nightly as needed for sleep   Quantity:  30 tablet   Refills:  0                Primary Care Provider Office Phone # Fax #    Piper Kiser [phone]       [address] [place] [address]        Equal Access to Services     SHIRAZ ROBBINS AH: Hadii juan leeo Soabelardo, waaxda luqadaha, qaybta kaalmada duranyada, zoe sykes. So [facility] [phone].    ATENCIÓN: Si habla español, tiene a eaton disposición servicios gratuitos de asistencia lingüística. [place] al [phone].    We comply with applicable federal civil rights laws and [place] laws. We do not discriminate on the basis of race, color, national origin, age, disability, sex, sexual orientation, or gender identity.            Thank you!     Thank you for " choosing [facility]  for your care. Our goal is always to provide you with excellent care. Hearing back from our patients is one way we can continue to improve our services. Please take a few minutes to complete the written survey that you may receive in the mail after your visit with us. Thank you!             Your Updated Medication List - Protect others around you: Learn how to safely use, store and throw away your medicines at www.disposemymeds.org.          This list is accurate as of 5/15/18  2:52 PM.  Always use your most recent med list.                   Brand Name Dispense Instructions for use Diagnosis    * albuterol (2.5 MG/3ML) 0.083% neb solution      Take 1 vial by nebulization 2 times daily        * albuterol 108 (90 Base) MCG/ACT Inhaler    PROAIR HFA/PROVENTIL HFA/VENTOLIN HFA    1 Inhaler    Inhale 2 puffs into the lungs every 6 hours as needed for wheezing    Intermittent asthma without complication       allopurinol 100 MG tablet    ZYLOPRIM          atorvastatin 20 MG tablet    LIPITOR    90 tablet    Take 1 tablet (20 mg) by mouth daily    ACS (acute coronary syndrome) (H), Hyperlipidemia with target LDL less than 130       budesonide-formoterol 80-4.5 MCG/ACT Inhaler    SYMBICORT    3 Inhaler    Inhale 2 puffs into the lungs 2 times daily    COPD exacerbation (H)       * denosumab 60 MG/ML Soln injection    PROLIA     Inject 60 mg Subcutaneous every 6 months        * denosumab 60 MG/ML Soln injection    PROLIA    1 mL    Inject 1 mL (60 mg) Subcutaneous every 6 months    Senile osteoporosis, CKD (chronic kidney disease) stage 3, GFR 30-59 ml/min       ferrous sulfate 325 (65 Fe) MG tablet    IRON     Take by mouth daily (with breakfast)        FIRST-HANS MOUTHWASH Susp     237 mL    Swish and swallow 5-10 mLs in mouth every 6 hours as needed    Thrush       fluticasone-vilanterol 200-25 MCG/INH oral inhaler    BREO ELLIPTA    3 Inhaler    Inhale 1 puff into the lungs  daily    COPD exacerbation (H)       furosemide 20 MG tablet    LASIX          gabapentin 300 MG capsule    NEURONTIN    90 capsule    Take 1 capsule (300 mg) by mouth daily as needed    Hip pain, left       levothyroxine 75 MCG tablet    SYNTHROID/LEVOTHROID    90 tablet    Take 1 tablet (75 mcg) by mouth daily    Other specified hypothyroidism       metoprolol succinate 25 MG 24 hr tablet    TOPROL-XL    90 tablet    Take 1 tablet (25 mg) by mouth daily    ACS (acute coronary syndrome) (H)       MULTIVITAMIN GUMMIES ADULT PO           traZODone 50 MG tablet    DESYREL    30 tablet    Take 1 tablet (50 mg) by mouth nightly as needed for sleep    Insomnia, unspecified type       * Notice:  This list has 4 medication(s) that are the same as other medications prescribed for you. Read the directions carefully, and ask your doctor or other care provider to review them with you.

## 2018-05-15 NOTE — LETTER
"    5/15/2018         RE: Marie Kline  [address]  [place] [facility] [address]        Dear Colleague,    Thank you for referring your patient, Marie Kline, to the [facility]. Please see a copy of my visit note below.    Dr. Obi Garcia  [facility]  Neurosurgery Clinic Visit        CC: low back and left leg pain    Primary care Provider: Piper Kiser      Reason For Visit:   I was asked by Dr. Kiser to consult on the patient for lumbar radicular pain on the left.      HPI: Marie Kline is a 86 year old female with lumbar radicular pain on the left.  She notes that this started about 4-5 years ago without incident. She states that she has back and left leg pain. She feels very \"unsure\" of her left leg and feels like it is numb and she feels like she is going to fall. She states that she has been told she is turning her left foot in at times as well. She denies any falls due to this.  She reports back pain with pain down the anterior and lateral leg to the foot. She gets numbness and a mikhail horse pain in it. She notes standing and walking make it worse. She also notes that sleeping can be difficulty depending on the position. She has not had PT or injection therapy for her pain.  The pt states that she lives alone and is able to care for herself but has pain.     Pain at its worst 10  Pain right now:  5    Past Medical History:   Diagnosis Date     Bladder cancer (H)      Cardiomyopathy (H)      CLL (chronic lymphocytic leukemia) (H)      Congestive heart failure (H) 10/24/2014    flash pulm edema ass w/Takotsubo     COPD (chronic obstructive pulmonary disease) (H)     noc O2     Hypothyroid      Mumps      Myocardial infarction 10/2014    Takotsubo presentation w/mild CAD     Pulmonary edema cardiac cause (H) 10/08/2014    associated w/Takotsubo ACS     Tricuspid valve disorders, 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    TR 2-3+ per echo       Past " Medical History reviewed with patient during visit.    Past Surgical History:   Procedure Laterality Date     BLADDER SURGERY       CORONARY ANGIOGRAPHY ADULT ORDER  10/2014    minimal CAD     CYSTOSCOPY       CYSTOSCOPY, BIOPSY BLADDER, COMBINED N/A 2/9/2016    Procedure: COMBINED CYSTOSCOPY, BIOPSY BLADDER;  Surgeon: Kar Nuñez MD;  Location: [facility] OR     CYSTOSCOPY, TRANSURETHRAL RESECTION (TUR) TUMOR BLADDER, COMBINED N/A 2/9/2016    Procedure: COMBINED CYSTOSCOPY, TRANSURETHRAL RESECTION (TUR) TUMOR BLADDER;  Surgeon: Kar Nuñez MD;  Location: [facility] OR     ESOPHAGOSCOPY, GASTROSCOPY, DUODENOSCOPY (EGD), COMBINED N/A 6/22/2016    Procedure: COMBINED ESOPHAGOSCOPY, GASTROSCOPY, DUODENOSCOPY (EGD);  Surgeon: Freddie Diez MD;  Location: [facility] GI     Past Surgical History reviewed with patient during visit.    Current Outpatient Prescriptions   Medication     albuterol (2.5 MG/3ML) 0.083% neb solution     albuterol (PROAIR HFA/PROVENTIL HFA/VENTOLIN HFA) 108 (90 BASE) MCG/ACT Inhaler     allopurinol (ZYLOPRIM) 100 MG tablet     atorvastatin (LIPITOR) 20 MG tablet     budesonide-formoterol (SYMBICORT) 80-4.5 MCG/ACT Inhaler     denosumab (PROLIA) 60 MG/ML SOLN injection     denosumab (PROLIA) 60 MG/ML SOLN injection     Diphenhyd-HC-Nystatin-Tetracyc (FIRST-HANS MOUTHWASH) SUSP     ferrous sulfate (IRON) 325 (65 FE) MG tablet     fluticasone-vilanterol (BREO ELLIPTA) 200-25 MCG/INH oral inhaler     furosemide (LASIX) 20 MG tablet     gabapentin (NEURONTIN) 300 MG capsule     levothyroxine (SYNTHROID/LEVOTHROID) 75 MCG tablet     metoprolol succinate (TOPROL-XL) 25 MG 24 hr tablet     Multiple Vitamins-Minerals (MULTIVITAMIN GUMMIES ADULT PO)     traZODone (DESYREL) 50 MG tablet     No current facility-administered medications for this visit.        Allergies   Allergen Reactions     Contrast Dye Hives       Social History     Social History     Marital status:      Spouse name: N/A     Number of  "children: N/A     Years of education: N/A     Social History Main Topics     Smoking status: Former Smoker     Types: Cigarettes     Quit date: 2/3/1996     Smokeless tobacco: Never Used     Alcohol use No     Drug use: No     Sexual activity: No     Other Topics Concern     Caffeine Concern No     1/2-2 cups daily     Special Diet Yes     no added salt, no dairy, more gatorade     Exercise No     walking her dog 5 times per day - none recently     Parent/Sibling W/ Cabg, Mi Or Angioplasty Before 65f 55m? No     Social History Narrative       Family History   Problem Relation Age of Onset     CEREBROVASCULAR DISEASE Mother      CANCER Father          Review Of Systems  Skin: negative  Eyes: negative  Ears/Nose/Throat: negative  Respiratory: Asthma,COPD  Cardiovascular: HTN/HLD  Gastrointestinal: negative  Genitourinary: history of bladder cancer with tumor resection Musculoskeletal: back pain  Neurologic: left leg pain   Psychiatric: negative  Hematologic/Lymphatic/Immunologic: leukemia treated by Dr. Ivory with monthly infusions   Endocrine: negative and thyroid disorder     ROS: 10 point ROS neg other than the symptoms noted above in the HPI.    Vital Signs: Ht 4' 11\" (1.499 m)  Wt 106 lb (48.1 kg)  BMI 21.41 kg/m2    Examination:  Constitutional:  Alert, well nourished, NAD.  Memory: recent and remote memory intact   HEENT: Normocephalic, atraumatic.   Pulm:  Without shortness of breath   CV:  No pitting edema of BLE.    Neurological:  Awake  Alert  Oriented x 3  Speech clear  Cranial nerves II - XII intact  PERRL  EOMI  Face symmetric  Tongue midline  Motor exam   Shoulder Abduction:  Right:  5/5   Left:  5/5  Biceps:                      Right:  5/5   Left:  5/5  Triceps:                     Right:  5/5   Left:  5/5  Wrist Extensors:       Right:  5/5   Left:  5/5  Wrist Flexors:           Right:  5/5   Left:  5/5  Intrinsics:                   Right:  5/5   Left:  5/5   Hip Flexor:                Right: 5/5  " "Left:  5/5  Hip Adductor:             Right:  5/5  Left:  5/5  Hip Abductor:             Right:  5/5  Left:  5/5  Gastroc Soleus:        Right:  5/5  Left:  5/5  Tib/Ant:                      Right:  5/5  Left:  5/5  EHL:                          Right:  5/5  Left:  5/5   Sensation normal to bilateral upper and lower extremities  Muscle tone to bilateral upper and lower extremities normal   Gait: Able to stand from a seated position. Normal non-antalgic, non-myelopathic gait.    Lumbar examination reveals tenderness of the spine and paraspinous muscles. Pain with lumbar extension.  Hip height is symmetrical. Negative SI joint, sciatic notch or greater trochanteric tenderness to palpation bilaterally.  Straight leg raise is positive for left leg and back pain.       Imaging: NONE    Assessment/Plan:   Marie Kline is a 86 year old female with lumbar radicular pain on the left.  She notes that this started about 4-5 years ago without incident. She states that she has back and left leg pain. She feels very \"unsure\" of her left leg and feels like it is numb and she feels like she is going to fall. She states that she has been told she is turning her left foot in at times as well. She denies any falls due to this.  She reports back pain with pain down the anterior and lateral leg to the foot. She gets numbness and a mikhail horse pain in it. She notes standing and walking make it worse. She also notes that sleeping can be difficulty depending on the position. She has not had PT or injection therapy for her pain.  The pt states that she lives alone and is able to care for herself but has pain. She is neurologically intact with back and left leg pain.  It was explained that since she has a history of bladder cancer with tumor resection and leukemia we will obtain a lumbar MRI with and without contrast.  She agreed to the POC.     Patient Instructions   1. Please call [facility] to have lumbar MRI completed. " Call [phone] to schedule your scan.  I will contact you with results.          Ariadna Cassidy CNP  [facility]  [address] [address]    Tel [phone]  Pager [phone]      Again, thank you for allowing me to participate in the care of your patient.        Sincerely,        NOHEMI Good CNP

## 2018-05-16 ENCOUNTER — HOSPITAL ENCOUNTER (OUTPATIENT)
Dept: MRI IMAGING | Facility: CLINIC | Age: 83
Discharge: HOME | End: 2018-05-16
Attending: NURSE PRACTITIONER | Admitting: NURSE PRACTITIONER
Payer: MEDICARE

## 2018-05-16 DIAGNOSIS — M54.16 LUMBAR RADICULAR PAIN: ICD-10-CM

## 2018-05-16 LAB
CREAT BLD-MCNC: 1 MG/DL (ref 0.52–1.04)
GFR SERPL CREATININE-BSD FRML MDRD: 53 ML/MIN/1.7M2

## 2018-05-16 PROCEDURE — A9585 GADOBUTROL INJECTION: HCPCS | Performed by: NURSE PRACTITIONER

## 2018-05-16 PROCEDURE — 25000128 H RX IP 250 OP 636: Performed by: NURSE PRACTITIONER

## 2018-05-16 PROCEDURE — 82565 ASSAY OF CREATININE: CPT

## 2018-05-16 PROCEDURE — 72158 MRI LUMBAR SPINE W/O & W/DYE: CPT

## 2018-05-16 RX ORDER — GADOBUTROL 604.72 MG/ML
7.5 INJECTION INTRAVENOUS ONCE
Status: COMPLETED | OUTPATIENT
Start: 2018-05-16 | End: 2018-05-16

## 2018-05-16 RX ADMIN — GADOBUTROL 5 ML: 604.72 INJECTION INTRAVENOUS at 13:52

## 2018-05-17 ENCOUNTER — TELEPHONE (OUTPATIENT)
Dept: NEUROSURGERY | Facility: CLINIC | Age: 83
End: 2018-05-17

## 2018-05-17 ENCOUNTER — TELEPHONE (OUTPATIENT)
Dept: PALLIATIVE MEDICINE | Facility: CLINIC | Age: 83
End: 2018-05-17

## 2018-05-17 DIAGNOSIS — M54.16 LUMBAR RADICULAR PAIN: Primary | ICD-10-CM

## 2018-05-17 DIAGNOSIS — M25.552 HIP PAIN, LEFT: ICD-10-CM

## 2018-05-17 DIAGNOSIS — M51.369 DDD (DEGENERATIVE DISC DISEASE), LUMBAR: ICD-10-CM

## 2018-05-17 DIAGNOSIS — M48.061 LUMBAR STENOSIS: ICD-10-CM

## 2018-05-17 RX ORDER — GABAPENTIN 300 MG/1
300 CAPSULE ORAL DAILY PRN
Qty: 90 CAPSULE | Refills: 3 | Status: SHIPPED | OUTPATIENT
Start: 2018-05-17 | End: 2018-11-18

## 2018-05-17 NOTE — TELEPHONE ENCOUNTER
MRI shows no signs of malignancy.  Severe DDD and stenosis on the left.  Recommend injections, PT and Neurontin.  LM for call back.

## 2018-05-17 NOTE — TELEPHONE ENCOUNTER
REASON FOR CALL:  Returning Ariadna's call to discuss imaging results.      Detailed message can be left:  YES

## 2018-05-17 NOTE — TELEPHONE ENCOUNTER
Left voicemail for patient to schedule Lumbar LEATHA.          Bettie HARRISON    [facility]

## 2018-05-17 NOTE — TELEPHONE ENCOUNTER
Pre-screening Questions for Radiology Injections:    Injection to be done at which interventional clinic site? [facility], instruct patient to arrive 30 minutes before the scheduled appointment time.     Procedure ordered by Ariadna Cassidy    Procedure ordered? Lumbar Epidural Steroid Injection    What insurance would patient like us to bill for this procedure? medicare      Worker's comp or MVA (motor vehicle accident) -Any injection DO NOT SCHEDULE and route to Bettie Ponce.      [email or web address] - For SI joint injections, DO NOT SCHEDULE and route Hanny Marroquin. HW FREEDOM NO PA REQUIRED EFFECTIVE 11/1/2017      [employer]- MBB's must be scheduled at LEAST two weeks apart      [employer] - Any injection besides hip/shoulder/knee joint DO NOT SCHEDULE and route to Hanny Marroquin. She will obtain PA and call pt back to schedule procedure or notify pt of denial.       [employer]-Route to Hanny for review    Any chance of pregnancy? Not Applicable   If YES, do NOT schedule and route to RN pool    Is an  needed? No     Patient has a drive home? (mandatory) YES:     Is patient taking any blood thinners (plavix, coumadin, jantoven, warfarin, heparin, pradaxa or dabigatran )? No   If hold needed, do NOT schedule, route to RN pool     Is patient taking any aspirin products? No     If more than 325mg/day do NOT schedule; route to RN pool     For CERVICAL procedures, hold all aspirin products for 6 days.      Does the patient have a bleeding or clotting disorder? No     If YES, okay to schedule AND route to RN nurse pool    **For any patients with platelet count <100, must be forwarded to provider**    Is patient diabetic?  Yes  If YES, have them bring their glucometer.    Does patient have an active infection or treated for one within the past week? No     Is patient currently taking any antibiotics?  No     For patients on chronic, preventative, or prophylactic  antibiotics, procedures may be scheduled.     For patients on antibiotics for active or recent infection:    Jose Valentino Burton, [place]-antibiotic course must have been completed for 4 days    Is patient currently taking any steroid medications? (i.e. Prednisone, Medrol)  No     For patients on steroid medications:    Jose Valentino Burton, [place]-steroid course must have been completed for 4 days    Reviewed with patient:  If you are started on any steroids or antibiotics between now and your appointment, you must contact us because it may affect our ability to perform your procedure.  Yes    Is patient actively being treated for cancer or immunocompromised? No  If YES, do NOT schedule and route to RN pool     Are you able to get on and off an exam table with minimal or no assistance? Yes  If NO, do NOT schedule and route to RN pool    Are you able to roll over and lay on your stomach with minimal or no assistance? Yes  If NO, do NOT schedule and route to RN pool     Any allergies to contrast dye, iodine, shellfish, or numbing and steroid medications? Yes - contrast dye  If YES, route to RN pool AND add allergy information to appointment notes    Allergies: Contrast dye      Has the patient had a flu shot or any other vaccinations within 7 days before or after the procedure.  No     Does patient have an MRI/CT?  YES:   (SI joint, hip injections, lumbar sympathetic blocks, and stellate ganglion blocks do not require an MRI)    Was the MRI done w/in the last 3 years?  Yes    Was MRI done at [place]? Yes      If not, where was it done? N/A       If MRI was not done at [facility], [facility] or [facility] do NOT schedule and route to nursing.  If pt has an imaging disc, the injection may be scheduled but pt has to bring disc to appt. If they show up w/out disc the injection cannot be done    Reminders (please tell patient if applicable):       Instructed pt to arrive 30 minutes early for IV  start if this is for a cervical procedure, ALL sympathetic (stellate ganglion, hypogastric, or lumbar sympathetic block) and all sedation procedures (RFA, spinal cord stimulation trials).  Not Applicable   -IVs are not routinely placed for Dr. Gonsalez cervical cases   -Dr. Brown: IVs for cervical ESIs and cervical TBDs (not CMBBs/facet inj)      If NPO for sedation, informed patient that it is okay to take medications with sips of water (except if they are to hold blood thinners).  Not Applicable   *DO take blood pressure medication if it is prescribed*      If this is for a cervical LEATHA, informed patient that aspirin needs to be held for 6 days.   Not Applicable      For all patients not having spinal cord stimulator (SCS) trials or radiofrequency ablations (RFAs), informed patient:    IV sedation is not provided for this procedure.  If you feel that an oral anti-anxiety medication is needed, you can discuss this further with your referring provider or primary care provider.  The Pain Clinic provider will discuss specifics of what the procedure includes at your appointment.  Most procedures last 10-20 minutes.  We use numbing medications to help with any discomfort during the procedure.  Not Applicable      Do not schedule procedures requiring IV placement in the first appointment of the day or first appointment after lunch. Do NOT schedule at 0745, 0815 or 1245.       For patients 85 or older we recommend having an adult stay w/ them for the remainder of the day.       Does the patient have any questions?  NO  Dottie Woodard  [facility]

## 2018-05-17 NOTE — TELEPHONE ENCOUNTER
MRI shows no signs of malignancy.  Severe DDD and stenosis on the left.  Recommend injections, PT and Neurontin.     Called patient and reviewed above information with her per Ariadna. Patient verbalized understanding and would like order  for PT and LEATHA. Orders placed in EPIC.She is currently on gabapentin but is in need of refill as she is going out of town. Patient states her current dose is 1 capsule 300 mg PO daily as needed and feels that this does is helpful for her. Medication was refilled and sent to her pharmacy. Advised to call clinic if any further questions or concerns.

## 2018-05-21 ENCOUNTER — TELEPHONE (OUTPATIENT)
Dept: INTERNAL MEDICINE | Facility: CLINIC | Age: 83
End: 2018-05-21

## 2018-05-21 DIAGNOSIS — J40 BRONCHITIS: Primary | ICD-10-CM

## 2018-05-21 RX ORDER — AZITHROMYCIN 250 MG/1
TABLET, FILM COATED ORAL
Qty: 6 TABLET | Refills: 0 | Status: SHIPPED | OUTPATIENT
Start: 2018-05-21 | End: 2018-07-02

## 2018-05-21 NOTE — TELEPHONE ENCOUNTER
Pt states she has been coughing up green phlegm for about 7 days at least. She has COPD and leukemia.   Sounds like a wet cough over the phone.     She has a lot of appointments lately and doesn't want to come in if doesn't need to.     No fevers.     Using nebs and inhalers.     She is asking for antibiotics to be called in to pharmacy. Pharmacy will deliver.

## 2018-05-22 NOTE — PROGRESS NOTES
[facility] - Procedure Note    Date of Service: 5/24/2018      Procedure performed: Left L4-5 transforaminal epidural steroid injection with fluoroscopic guidance  Diagnosis: Lumbar spondylosis; Lumbar radiculitis/radiculopathy  : Jeaneth Gonsalez MD & Isela Ruiz MD (pain fellow)   Anesthesia: none    Indications: Marie Kline is a 86 year old female who is seen at the request of Ariadna Cassidy CNP for lumbar transforaminal epidural steroid injection. The patient describes left sided low back and buttock pain. The patient has been exhibiting symptoms consistent with lumbar intraspinal inflammation and radiculopathy. Symptoms have been persistent, disabling, and intermittently severe. The patient reports minimal improvement with conservative treatment, including PT and medications.    This is a repeat injection.  Previous Left L5-S1 done by Dr. Hayden on 9/01/2016 & 12/03/2015 provided good pain relief for a period of 6 months.     Lumbar MRI was done on 5/16/2018 which showed   FINDINGS: The report is dictated assuming five lumbar-type vertebral  bodies, and radiographic correlation may be necessary. The distal  spinal cord and cauda equina appear normal.  Tip of enlarged spleen is  noted on the left similar to prior study 10/20/2015. Levoscoliosis  noted. Very slight spondylolisthesis of L4 on L5 and L5 on S1,  unchanged in the interval.     T12-L1: Normal disc, facet joints, spinal canal and neural foramina.       L1-L2: Normal disc, facet joints, spinal canal and neural foramina.       L2-L3: Moderate degenerative narrowing of the interspace. Prominent  annular bulge. No focal disc protrusion. No significant central or  lateral stenosis.     L3-L4: Advanced narrowing of the interspace. Annular bulge. No  significant central stenosis. Mild foraminal stenosis on the right.     L4-L5: Advanced narrowing of the interspace. Moderate to severe  central spinal stenosis. Severe bilateral facet  joint disease. Severe  bilateral foraminal stenosis left worse than right. No interval  change.     L5-S1: Severe narrowing of the interspace. Annular bulge. No  significant central stenosis. Moderate foraminal stenosis on the left.  Severe facet joint disease on the left and mild facet joint disease on  the right. No interval change.     Paraspinous soft tissues: Normal.       Bone marrow: No evidence for metastatic disease.     No pathologic enhancement with gadolinium.       IMPRESSION:    1. Multilevel degenerative change as described most severe at L4-L5.  2. Findings have not changed since prior exam of 10/30/2015.  3. No evidence for metastatic disease.    Allergies:      Allergies   Allergen Reactions     Contrast Dye Hives     CT DYE        Vitals:  BP 98/58  Pulse 82  SpO2 90%    Review of Systems: The patient denies recent fever, chills, illness, use of antibiotics or anticoagulants. All other 10-point review of systems negative.     Procedure: The procedure and risks were explained, and informed written consent was obtained from the patient. Risks include but are not limited to: infection, bleeding, increased pain, and damage to soft tissue, nerve, muscle, and vasculature structures. After getting informed consent, patient was brought into the procedure suite and was placed in a prone position on the procedure table. A Pause for the Cause was performed. Patient was prepped and draped in sterile fashion.     After identifying the left L5-S1 neuroforamen, the C-arm was rotated to a left lateral oblique angle.  A total of 4.5 mL of Lidocaine 1% was used to anesthetize the skin and the needle track at a skin entry site coaxial with the fluoroscopy beam, and overriding the superior aspect of the neuroforamen.  A 22 gauge 5 inch spinal needle was advanced under intermittent fluoroscopy until it entered the foramen superiorly.    The position was then inspected from anteroposterior and lateral views, and the  needle adjusted appropriately.  After negative aspiration, a total of 1 mL of Omnipaque-300 was injected using static and continuous fluoroscopy confirming appropriate position, with spread along the nerve root sheath and into the epidural space, with no intravascular or intrathecal uptake. 9 mL of Omnipaque-300 was wasted.    2mL of 1% lidocaine with 20 mg of dexamethasone was injected.  The needle was removed. Hemostasis was achieved, the area was cleaned, and bandaids were placed when appropriate. Images were saved to PACS.    The patient tolerated the procedure well, and was taken to the recovery room, and there was no evidence of procedural complications. No new sensory or motor deficits were noted following the procedure. The patient was stable and able to ambulate on discharge home. Post-procedure instructions were provided.     Pre-procedure pain score: 4/10 in the back, 4/10 in the leg  Post-procedure pain score: 0/10 in the back, 0/10 in the leg    Assessment/Plan: Marie Kline is a 86 year old female s/p left L5-S1 transforaminal epidural steroid injection today for lumbar spondylosis, radiculitis/radiculopathy.     1. Following today's procedure, the patient was advised to contact the [facility] for any of the following:   Fever, chills, or night sweats   New onset of pain, numbness, or weakness   Any questions/concerns regarding the procedure  If unable to contact the Pain Center, the patient was instructed to go to a local Emergency Room for any complications.   2. The patient will receive a follow-up call in 1 week.  3. The patient should follow-up with the referring provider in 2 weeks for post-procedure evaluation.      Jeaneth Gonsalez MD   [facility]

## 2018-05-24 ENCOUNTER — RADIOLOGY INJECTION OFFICE VISIT (OUTPATIENT)
Dept: PALLIATIVE MEDICINE | Facility: CLINIC | Age: 83
End: 2018-05-24
Payer: MEDICARE

## 2018-05-24 ENCOUNTER — TELEPHONE (OUTPATIENT)
Dept: INTERNAL MEDICINE | Facility: CLINIC | Age: 83
End: 2018-05-24

## 2018-05-24 ENCOUNTER — RADIANT APPOINTMENT (OUTPATIENT)
Dept: GENERAL RADIOLOGY | Facility: CLINIC | Age: 83
End: 2018-05-24
Attending: ANESTHESIOLOGY
Payer: MEDICARE

## 2018-05-24 VITALS — OXYGEN SATURATION: 89 % | HEART RATE: 83 BPM | DIASTOLIC BLOOD PRESSURE: 83 MMHG | SYSTOLIC BLOOD PRESSURE: 133 MMHG

## 2018-05-24 DIAGNOSIS — M54.16 LUMBAR RADICULAR PAIN: ICD-10-CM

## 2018-05-24 DIAGNOSIS — M54.16 LUMBAR RADICULOPATHY: Primary | ICD-10-CM

## 2018-05-24 PROCEDURE — 64483 NJX AA&/STRD TFRM EPI L/S 1: CPT | Mod: LT | Performed by: ANESTHESIOLOGY

## 2018-05-24 NOTE — PATIENT INSTRUCTIONS
[facility] Procedure Discharge Instructions    Today you saw:   Dr. Jeaneth Gonsalez           Your procedure: Lumbar Epidural steroid injection       Medications used:  Lidocaine (anesthetic)  Dexamethasone (steroid)   Gadolinium (contrast)              Be cautious when walking as numbness and/or weakness in the legs may occur up to 6-8 hours after the procedure due to effect of the local anesthetic    Do not drive for 6 hours. The effect of the local anesthetic could slow your reflexes.     Avoid strenuous activity for the first 24 hours. You may resume your regular activities after that.     You may shower, however avoid swimming, tub baths or hot tubs for 24 hours following your procedure    You may have a mild to moderate increase in pain for several days following the injection.      You may use ice packs for 10-15 minutes, 3 to 4 times a day at the injection site for comfort    Do not use heat to painful areas for 6 to 8 hours. This will give the local anesthetic time to wear off and prevent you from accidentally burning your skin.    You may use anti-inflammatory medications (such as Ibuprofen/Advil or Aleve) or Tylenol for pain control if necessary    With diabetes, check your blood sugar more frequently than usual as your blood sugar may be higher than normal for 10-14 days following a steroid injection. Contact your doctor who manages your diabetes if your blood sugar is higher than usual    It may take up to 14 days for the steroid medication to start working although you may feel the effect as early as a few days after the procedure.     Follow up with your referring provider in 2-3 weeks      If you experience any of the following, call the pain center line during work hours at [phone] or on-call physician after hours at [phone]:  -Fever over 100 degree F  -Swelling, bleeding, redness, drainage, warmth at the injection site  -Progressive weakness or numbness in your legs or arms  -Loss  of bowel or bladder function  -Unusual headache that is not relieved by Tylenol or your regular headache medication  -Unusual new onset of pain that is not improving    Phone #s:  Nurse triage line for general questions: [phone]

## 2018-05-24 NOTE — NURSING NOTE
Pre-procedure Intake    Have you been fasting? NA    If yes, for how long?     Are you taking a prescribed blood thinner such as coumadin, Plavix, Xarelto?    No    If yes, when did you take your last dose?     Do you take aspirin?  No    If cervical procedure, have you held aspirin for 6 days?   NA    Do you have any allergies to contrast dye, iodine, steroid and/or numbing medications?  YES: contrast dye    Are you currently taking antibiotics or have an active infection?  YES: Z pac    Have you had a fever/elevated temperature within the past week? NO    Are you currently taking oral steroids? NO    Do you have a ? Yes       Are you pregnant or breastfeeding?  Not Applicable    Are the vital signs normal?  Yes

## 2018-05-24 NOTE — MR AVS SNAPSHOT
After Visit Summary   5/24/2018    Marie Kline    MRN: [id number]           Patient Information     Date Of Birth          4/28/1932        Visit Information        Provider Department      5/24/2018 2:45 PM Jeaneth Gonsalez MD [facility]        Care Instructions    [facility] Procedure Discharge Instructions    Today you saw:   Dr. Jeaneth Gonsalez           Your procedure: Lumbar Epidural steroid injection       Medications used:  Lidocaine (anesthetic)  Dexamethasone (steroid)   Gadolinium (contrast)              Be cautious when walking as numbness and/or weakness in the legs may occur up to 6-8 hours after the procedure due to effect of the local anesthetic    Do not drive for 6 hours. The effect of the local anesthetic could slow your reflexes.     Avoid strenuous activity for the first 24 hours. You may resume your regular activities after that.     You may shower, however avoid swimming, tub baths or hot tubs for 24 hours following your procedure    You may have a mild to moderate increase in pain for several days following the injection.      You may use ice packs for 10-15 minutes, 3 to 4 times a day at the injection site for comfort    Do not use heat to painful areas for 6 to 8 hours. This will give the local anesthetic time to wear off and prevent you from accidentally burning your skin.    You may use anti-inflammatory medications (such as Ibuprofen/Advil or Aleve) or Tylenol for pain control if necessary    With diabetes, check your blood sugar more frequently than usual as your blood sugar may be higher than normal for 10-14 days following a steroid injection. Contact your doctor who manages your diabetes if your blood sugar is higher than usual    It may take up to 14 days for the steroid medication to start working although you may feel the effect as early as a few days after the procedure.     Follow up with your referring provider in 2-3 weeks      If you  experience any of the following, call the pain center line during work hours at [phone] or on-call physician after hours at [phone]:  -Fever over 100 degree F  -Swelling, bleeding, redness, drainage, warmth at the injection site  -Progressive weakness or numbness in your legs or arms  -Loss of bowel or bladder function  -Unusual headache that is not relieved by Tylenol or your regular headache medication  -Unusual new onset of pain that is not improving    Phone #s:  Nurse triage line for general questions: [phone]          Follow-ups after your visit        Your next 10 appointments already scheduled     Oct 11, 2018 11:00 AM CDT   Cystoscopy with Kar Nuñez MD, UB CYF   [facility] ([facility])    [address]  [address]  [facility] [address] [phone]              Who to contact     If you have questions or need follow up information about today's clinic visit or your schedule please contact [facility] directly at [phone].  Normal or non-critical lab and imaging results will be communicated to you by Limbohart, letter or phone within 4 business days after the clinic has received the results. If you do not hear from us within 7 days, please contact the clinic through [employer] technologyt or phone. If you have a critical or abnormal lab result, we will notify you by phone as soon as possible.  Submit refill requests through XDx or call your pharmacy and they will forward the refill request to us. Please allow 3 business days for your refill to be completed.          Additional Information About Your Visit        [employer] Information     XDx gives you secure access to your electronic health record. If you see a primary care provider, you can also send messages to your care team and make appointments. If you have questions, please call your primary care clinic.  If you do not have a primary care  provider, please call [phone] and they will assist you.        Care EveryWhere ID     This is your Care EveryWhere ID. This could be used by other organizations to access your [facility] medical records  [id number]        Your Vitals Were     Pulse Pulse Oximetry                82 90%           Blood Pressure from Last 3 Encounters:   05/24/18 98/58   04/24/18 106/58   03/08/18 104/68    Weight from Last 3 Encounters:   05/15/18 48.1 kg (106 lb)   04/24/18 48.3 kg (106 lb 6.4 oz)   03/08/18 47.6 kg (105 lb)              Today, you had the following     No orders found for display         Today's Medication Changes          These changes are accurate as of 5/24/18  3:08 PM.  If you have any questions, ask your nurse or doctor.               These medicines have changed or have updated prescriptions.        Dose/Directions    traZODone 50 MG tablet   Commonly known as:  DESYREL   This may have changed:  how much to take   Used for:  Insomnia, unspecified type        Dose:  50 mg   Take 1 tablet (50 mg) by mouth nightly as needed for sleep   Quantity:  30 tablet   Refills:  0                Primary Care Provider Office Phone # Fax #    Piper Kiser [phone]       [address] [place] [id number]        Equal Access to Services     RONNA ROBBINS AH: Hadii juan warner hadasho Soomaali, waaxda luqadaha, qaybta kaalmada adeegyada, zoe sykes. So [facility] [phone].    ATENCIÓN: Si habla español, tiene a eaton disposición servicios gratuitos de asistencia lingüística. Llame al [phone].    We comply with applicable federal civil rights laws and [place] laws. We do not discriminate on the basis of race, color, national origin, age, disability, sex, sexual orientation, or gender identity.            Thank you!     Thank you for choosing [facility]  for your care. Our goal is always to provide you with excellent care. Hearing back from our  patients is one way we can continue to improve our services. Please take a few minutes to complete the written survey that you may receive in the mail after your visit with us. Thank you!             Your Updated Medication List - Protect others around you: Learn how to safely use, store and throw away your medicines at www.disposemymeds.org.          This list is accurate as of 5/24/18  3:08 PM.  Always use your most recent med list.                   Brand Name Dispense Instructions for use Diagnosis    * albuterol (2.5 MG/3ML) 0.083% neb solution      Take 1 vial by nebulization 2 times daily        * albuterol 108 (90 Base) MCG/ACT Inhaler    PROAIR HFA/PROVENTIL HFA/VENTOLIN HFA    1 Inhaler    Inhale 2 puffs into the lungs every 6 hours as needed for wheezing    Intermittent asthma without complication       allopurinol 100 MG tablet    ZYLOPRIM          atorvastatin 20 MG tablet    LIPITOR    90 tablet    Take 1 tablet (20 mg) by mouth daily    ACS (acute coronary syndrome) (H), Hyperlipidemia with target LDL less than 130       azithromycin 250 MG tablet    ZITHROMAX    6 tablet    Two tablets first day, then one tablet daily for four days.    Bronchitis       budesonide-formoterol 80-4.5 MCG/ACT Inhaler    SYMBICORT    3 Inhaler    Inhale 2 puffs into the lungs 2 times daily    COPD exacerbation (H)       * denosumab 60 MG/ML Soln injection    PROLIA     Inject 60 mg Subcutaneous every 6 months        * denosumab 60 MG/ML Soln injection    PROLIA    1 mL    Inject 1 mL (60 mg) Subcutaneous every 6 months    Senile osteoporosis, CKD (chronic kidney disease) stage 3, GFR 30-59 ml/min       ferrous sulfate 325 (65 Fe) MG tablet    IRON     Take by mouth daily (with breakfast)        FIRST-HANS MOUTHWASH Susp     237 mL    Swish and swallow 5-10 mLs in mouth every 6 hours as needed    Thrush       fluticasone-vilanterol 200-25 MCG/INH oral inhaler    BREO ELLIPTA    3 Inhaler    Inhale 1 puff into the lungs  daily    COPD exacerbation (H)       furosemide 20 MG tablet    LASIX          gabapentin 300 MG capsule    NEURONTIN    90 capsule    Take 1 capsule (300 mg) by mouth daily as needed    Hip pain, left, Lumbar radicular pain       levothyroxine 75 MCG tablet    SYNTHROID/LEVOTHROID    90 tablet    Take 1 tablet (75 mcg) by mouth daily    Other specified hypothyroidism       metoprolol succinate 25 MG 24 hr tablet    TOPROL-XL    90 tablet    Take 1 tablet (25 mg) by mouth daily    ACS (acute coronary syndrome) (H)       MULTIVITAMIN GUMMIES ADULT PO           traZODone 50 MG tablet    DESYREL    30 tablet    Take 1 tablet (50 mg) by mouth nightly as needed for sleep    Insomnia, unspecified type       * Notice:  This list has 4 medication(s) that are the same as other medications prescribed for you. Read the directions carefully, and ask your doctor or other care provider to review them with you.

## 2018-05-24 NOTE — TELEPHONE ENCOUNTER
MICHELLE Salazar has been approved to the [employer] assistance program for Symbicort through December 2018.  She will receive this medication at no cost for the enrollment period.    A 90 day supply of SYMBICORT will be delivered to Marie's home within 7-10 business days.    Marie will contact my office for refills as we must work directly with the .  I will note EPIC as each refill is requested.    Thanks so much for your help!    Rupali Lewis  Prescription

## 2018-05-24 NOTE — PROGRESS NOTES
Patient placed on BiPAP for increased WOB 12/5, 12 40%, tolerating well very anxious about the mask, breath sounds diminished, Duoneb given inline.  RT continue to follow.    Renate Woods  March 17, 2017.5:55 AM         wound check for an abbess drained yesterday.

## 2018-05-24 NOTE — NURSING NOTE
Discharge Information    IV Discontiued Time:  NA    Amount of Fluid Infused:  NA    Discharge Criteria = When patient returns to baseline or as per MD order    Consciousness:  Pt is fully awake    Circulation:  BP +/- 20% of pre-procedure level    Respiration:  Patient is able to breathe deeply    O2 Sat:  Patient is able to maintain O2 Sat >92% on room air    Activity:  Moves 4 extremities on command    Ambulation:  Patient is able to stand and walk or stand and pivot into wheelchair    Dressing:  Clean/dry or No Dressing    Notes:   Discharge instructions and AVS given to patient    Patient meets criteria for discharge?  YES    Admitted to PCU?  No    Responsible adult present to accompany patient home?  Yes    Signature/Title:    Margarita Brown RN Care Coordinator  [facility]

## 2018-06-01 ENCOUNTER — TELEPHONE (OUTPATIENT)
Dept: PALLIATIVE MEDICINE | Facility: CLINIC | Age: 83
End: 2018-06-01

## 2018-06-01 NOTE — TELEPHONE ENCOUNTER
Patient had a Left L4-5 transforaminal epidural steroid injection  on 5/24/18.  Called patient for an update.      Left message that we were calling for an update about how she was doing after the injection.  LM that if she has any problems or questions to call the clinic at [phone].

## 2018-06-05 ENCOUNTER — TELEPHONE (OUTPATIENT)
Dept: PHARMACY | Facility: CLINIC | Age: 83
End: 2018-06-05

## 2018-06-05 NOTE — LETTER
June 5, 2018      Marie Kline  [address]  [facility] [address]        Dear Marie,       Our records show that you are due for a Medication Therapy Management (MTM) appointment. Your medications play an important role in your health.  I would like to meet with you to review how your medications are working for you and to answer any questions you may have.       To schedule an appointment, please call the clinic at #[phone].    I hope to see you soon!         Sincerely,        Yuliana Cast , Pharm D  [phone] (phone)  [phone] (pager)  Medication Therapy Management Pharmacist

## 2018-06-05 NOTE — TELEPHONE ENCOUNTER
We have attempted to contact this patient two times to set up a MTM follow up appointment and were unsuccessful. Contact attempts were made via letter and [employer]. We will no longer continue to contact this patient to schedule a visit at this time. Please refer back to MTM if you believe this patient would continue to benefit from our services.     Thank you!    Michelle Glover, PharmD  Pharmaceutical Care Resident   Pager: [phone]

## 2018-06-13 ENCOUNTER — TELEPHONE (OUTPATIENT)
Dept: INTERNAL MEDICINE | Facility: CLINIC | Age: 83
End: 2018-06-13

## 2018-06-13 DIAGNOSIS — J44.1 COPD EXACERBATION (H): Primary | ICD-10-CM

## 2018-06-13 NOTE — TELEPHONE ENCOUNTER
Pt called stating the company for her new Nebulizer does not take her medicare, but pt found a company that does. Wanted to inform here pcp to order Nebulizer using the new company.    [facility]  Location :   [address], [place] [address]    Phone   [phone]    Fax   [phone]

## 2018-06-25 ENCOUNTER — TRANSFERRED RECORDS (OUTPATIENT)
Dept: HEALTH INFORMATION MANAGEMENT | Facility: CLINIC | Age: 83
End: 2018-06-25

## 2018-07-02 ENCOUNTER — OFFICE VISIT (OUTPATIENT)
Dept: INTERNAL MEDICINE | Facility: CLINIC | Age: 83
End: 2018-07-02
Payer: MEDICARE

## 2018-07-02 ENCOUNTER — TELEPHONE (OUTPATIENT)
Dept: INTERNAL MEDICINE | Facility: CLINIC | Age: 83
End: 2018-07-02

## 2018-07-02 VITALS
BODY MASS INDEX: 20.56 KG/M2 | WEIGHT: 102 LBS | DIASTOLIC BLOOD PRESSURE: 70 MMHG | SYSTOLIC BLOOD PRESSURE: 128 MMHG | HEART RATE: 91 BPM | HEIGHT: 59 IN | TEMPERATURE: 98.9 F | OXYGEN SATURATION: 88 %

## 2018-07-02 DIAGNOSIS — J44.1 COPD EXACERBATION (H): Primary | ICD-10-CM

## 2018-07-02 DIAGNOSIS — E03.8 OTHER SPECIFIED HYPOTHYROIDISM: ICD-10-CM

## 2018-07-02 DIAGNOSIS — C91.10 CLL (CHRONIC LYMPHOCYTIC LEUKEMIA) (H): ICD-10-CM

## 2018-07-02 DIAGNOSIS — B37.0 THRUSH: ICD-10-CM

## 2018-07-02 DIAGNOSIS — I42.8 NONISCHEMIC CARDIOMYOPATHY (H): ICD-10-CM

## 2018-07-02 DIAGNOSIS — N18.30 CKD (CHRONIC KIDNEY DISEASE) STAGE 3, GFR 30-59 ML/MIN (H): ICD-10-CM

## 2018-07-02 DIAGNOSIS — C67.9 MALIGNANT NEOPLASM OF URINARY BLADDER, UNSPECIFIED SITE (H): ICD-10-CM

## 2018-07-02 PROCEDURE — 99214 OFFICE O/P EST MOD 30 MIN: CPT | Performed by: INTERNAL MEDICINE

## 2018-07-02 NOTE — TELEPHONE ENCOUNTER
Tuesday, pharmacy intern calling from [facility] calling stating they do not carry the ingredients to make the  First- Tami mouthwash suspension asking if Rx can be sent to [facility] and gift to compound instead.    Sent as requested.   Attempted to contact patient to inform had to send to a different pharmacy,  no answer, left voice message to call back.

## 2018-07-02 NOTE — PROGRESS NOTES
SUBJECTIVE:   Marie Kline is a 86 year old female who presents to clinic today for the following health issues:      Heart Failure Follow-up  takysubo after moving and loss of     Symptoms:    Shortness of breath: happens with rest and exertion    Lower extremity edema: none    Chest pain: No    Using more pillows than normal: No    Cough at night: No    Weight:    Checking weight daily: Yes    Weight change: weight decrease of 4 lb since 5/15/18    Cardiology visits, ER/UC, or hospital admissions since last visit: None    Medication side effects: none    Anxiety Follow-Up    Status since last visit: No change    Other associated symptoms:None    Complicating factors:   Significant life event: No   Current substance abuse: None  Depression symptoms: No  No flowsheet data found.    LESLY-7  COPD Follow-Up    Symptoms are currently: slightly worsened    Current fatigue or dyspnea with ambulation: none    Shortness of breath: slightly worsened    Increased or change in Cough/Sputum: No    Fever(s): No    Baseline ambulation without stopping to rest:  Walks a couple of blocks with her dog    Any ER/UC or hospital admissions since your last visit? No     History   Smoking Status     Former Smoker     Types: Cigarettes     Quit date: 2/3/1996   Smokeless Tobacco     Never Used     No results found for: FEV1, PRB8LJL    Chronic Kidney Disease Follow-up      Current NSAID use?  No    Hypothyroidism Follow-up      Since last visit, patient describes the following symptoms: weight loss of 4 lbs in 6 weeks    Bladder cancer.  Following urology.  CLL.  She is following with Dr. Ivory    She was to have the epidural of her spine.  She was supposed to see Dr. Gonsalez. She had the epidural injection, which did not help.       Amount of exercise or physical activity:walks diddo- feels ok walking with the dog    Problems taking medications regularly: No    Medication side effects: none    Diet: regular (no  "restrictions)          Problem list and histories reviewed & adjusted, as indicated.    Reviewed and updated as needed this visit by clinical staff  Tobacco  Allergies  Med Hx  Surg Hx  Fam Hx  Soc Hx      Reviewed and updated as needed this visit by Provider         ROS:  CONSTITUTIONAL: NEGATIVE for fever, chills, change in weight  RESP: NEGATIVE for significant cough or SOB  CV: NEGATIVE for chest pain, palpitations or peripheral edema    OBJECTIVE:     /70 (BP Location: Left arm, Cuff Size: Adult Regular)  Pulse 91  Temp 98.9  F (37.2  C) (Oral)  Ht 4' 11\" (1.499 m)  Wt 102 lb (46.3 kg)  SpO2 (!) 88%  Breastfeeding? No  BMI 20.6 kg/m2  Body mass index is 20.6 kg/(m^2).  GENERAL: healthy, alert and no distress  RESP: lungs clear to auscultation - no rales, rhonchi or wheezes  CV: regular rate and rhythm, normal S1 S2, no S3 or S4, no murmur, click or rub  Psych: normal affect    ASSESSMENT/PLAN:     (J44.1) COPD exacerbation (H)  (primary encounter diagnosis)  Comment:   Plan: continue on inhalers    (I42.8) Nonischemic cardiomyopathy (H)  Comment:   Plan:     (B37.0) Thrush  Comment: pt wishes to have with her inhalers incase  Plan: DISCONTINUED: Diphenhyd-HC-Nystatin-Tetracyc         (FIRST-HANS MOUTHWASH) SUSP            (N18.3) CKD (chronic kidney disease) stage 3, GFR 30-59 ml/min  Comment:   Plan: avoid NSAIDs    (C67.9) Malignant neoplasm of urinary bladder, unspecified site (H)  Comment:   Plan: followed by urology    (E03.8) Other specified hypothyroidism  Comment: stable  Plan: levothyroxine    CLL.  Pt to discuss allopurinol with Dr. Ivory    Back pain. Pt to contact pain clinic for back pain follow up      Piper Kiser MD  [facility]    "

## 2018-07-02 NOTE — MR AVS SNAPSHOT
After Visit Summary   7/2/2018    Marie Kline    MRN: [id number]           Patient Information     Date Of Birth          4/28/1932        Visit Information        Provider Department      7/2/2018 4:40 PM Piper Kiser MD [facility]        Today's Diagnoses     Malignant neoplasm of urinary bladder, unspecified site (H)    -  1    CKD (chronic kidney disease) stage 3, GFR 30-59 ml/min        Other specified hypothyroidism        Hypoxia        COPD exacerbation (H)        Thrush          Care Instructions    Gabapentin 600mg at night      Pain clinic [phone].          Follow-ups after your visit        Your next 10 appointments already scheduled     Oct 11, 2018 11:00 AM CDT   Cystoscopy with Kar Nuñez MD, UB CYF   [facility] ([facility])    [address]  [address]  [facility] [address] [phone]              Who to contact     If you have questions or need follow up information about today's clinic visit or your schedule please contact [facility] directly at [phone].  Normal or non-critical lab and imaging results will be communicated to you by StageBlochart, letter or phone within 4 business days after the clinic has received the results. If you do not hear from us within 7 days, please contact the clinic through StageBlochart or phone. If you have a critical or abnormal lab result, we will notify you by phone as soon as possible.  Submit refill requests through Xmybox or call your pharmacy and they will forward the refill request to us. Please allow 3 business days for your refill to be completed.          Additional Information About Your Visit        MyChart Information     Xmybox gives you secure access to your electronic health record. If you see a primary care provider, you can also send messages to your care team and make appointments. If you have questions,  "please call your primary care clinic.  If you do not have a primary care provider, please call [phone] and they will assist you.        Care EveryWhere ID     This is your Care EveryWhere ID. This could be used by other organizations to access your [facility] medical records  [id number]        Your Vitals Were     Pulse Temperature Height Pulse Oximetry Breastfeeding? BMI (Body Mass Index)    91 98.9  F (37.2  C) (Oral) 4' 11\" (1.499 m) 88% No 20.6 kg/m2       Blood Pressure from Last 3 Encounters:   07/02/18 128/70   05/24/18 133/83   04/24/18 106/58    Weight from Last 3 Encounters:   07/02/18 102 lb (46.3 kg)   05/15/18 106 lb (48.1 kg)   04/24/18 106 lb 6.4 oz (48.3 kg)              Today, you had the following     No orders found for display         Today's Medication Changes          These changes are accurate as of 7/2/18  5:36 PM.  If you have any questions, ask your nurse or doctor.               These medicines have changed or have updated prescriptions.        Dose/Directions    denosumab 60 MG/ML Soln injection   Commonly known as:  PROLIA   This may have changed:  Another medication with the same name was removed. Continue taking this medication, and follow the directions you see here.   Used for:  Senile osteoporosis, CKD (chronic kidney disease) stage 3, GFR 30-59 ml/min   Changed by:  Piper Kiser MD        Dose:  60 mg   Inject 1 mL (60 mg) Subcutaneous every 6 months   Quantity:  1 mL   Refills:  0       * FIRST-HANS MOUTHWASH Susp   This may have changed:  Another medication with the same name was added. Make sure you understand how and when to take each.   Used for:  Thrush   Changed by:  Piper Kiser MD        Dose:  5-10 mL   Swish and swallow 5-10 mLs in mouth every 6 hours as needed   Quantity:  237 mL   Refills:  1       * FIRST-HANS MOUTHWASH Susp   This may have changed:  You were already taking a medication with the same name, and this prescription was added. Make " sure you understand how and when to take each.   Used for:  Thrush   Changed by:  Piper Kiser MD        Dose:  5-10 mL   Swish and swallow 5-10 mLs in mouth every 6 hours as needed   Quantity:  237 mL   Refills:  1       traZODone 50 MG tablet   Commonly known as:  DESYREL   This may have changed:  how much to take   Used for:  Insomnia, unspecified type        Dose:  50 mg   Take 1 tablet (50 mg) by mouth nightly as needed for sleep   Quantity:  30 tablet   Refills:  0       * Notice:  This list has 2 medication(s) that are the same as other medications prescribed for you. Read the directions carefully, and ask your doctor or other care provider to review them with you.      Stop taking these medicines if you haven't already. Please contact your care team if you have questions.     fluticasone-vilanterol 200-25 MCG/INH oral inhaler   Commonly known as:  BREO ELLIPTA   Stopped by:  Piper Kiser MD                Where to get your medicines      These medications were sent to [facility][id number] - [place], [place] - [address] [facility]  [address] [place] [address]     Phone:  [phone]     [facility] MOUTHWASH [facility]                Primary Care Provider Office Phone # Fax #    Piper Kiser [phone]       [address] [place] [address]        Equal Access to Services     [facility]: Hadii aad ku hadasho Soomaali, waaxda luqadaha, qaybta kaalmada adeegyada, zoe sykes. So [facility] [phone].    ATENCIÓN: Si habla español, tiene a eaton disposición servicios gratuitos de asistencia lingüística. Deliaame al [phone].    We comply with applicable federal civil rights laws and [place] laws. We do not discriminate on the basis of race, color, national origin, age, disability, sex, sexual orientation, or gender identity.            Thank you!     Thank you for choosing [facility]  for your care. Our goal is always to  provide you with excellent care. Hearing back from our patients is one way we can continue to improve our services. Please take a few minutes to complete the written survey that you may receive in the mail after your visit with us. Thank you!             Your Updated Medication List - Protect others around you: Learn how to safely use, store and throw away your medicines at www.disposemymeds.org.          This list is accurate as of 7/2/18  5:36 PM.  Always use your most recent med list.                   Brand Name Dispense Instructions for use Diagnosis    * albuterol (2.5 MG/3ML) 0.083% neb solution      Take 1 vial by nebulization 2 times daily        * albuterol 108 (90 Base) MCG/ACT Inhaler    PROAIR HFA/PROVENTIL HFA/VENTOLIN HFA    1 Inhaler    Inhale 2 puffs into the lungs every 6 hours as needed for wheezing    Intermittent asthma without complication       allopurinol 100 MG tablet    ZYLOPRIM          atorvastatin 20 MG tablet    LIPITOR    90 tablet    Take 1 tablet (20 mg) by mouth daily    ACS (acute coronary syndrome) (H), Hyperlipidemia with target LDL less than 130       budesonide-formoterol 80-4.5 MCG/ACT Inhaler    SYMBICORT    3 Inhaler    Inhale 2 puffs into the lungs 2 times daily    COPD exacerbation (H)       denosumab 60 MG/ML Soln injection    PROLIA    1 mL    Inject 1 mL (60 mg) Subcutaneous every 6 months    Senile osteoporosis, CKD (chronic kidney disease) stage 3, GFR 30-59 ml/min       ferrous sulfate 325 (65 Fe) MG tablet    IRON     Take by mouth daily (with breakfast)        * FIRST-HANS MOUTHWASH Susp     237 mL    Swish and swallow 5-10 mLs in mouth every 6 hours as needed    Thrush       * FIRST-HANS MOUTHWASH Susp     237 mL    Swish and swallow 5-10 mLs in mouth every 6 hours as needed    Thrush       furosemide 20 MG tablet    LASIX          gabapentin 300 MG capsule    NEURONTIN    90 capsule    Take 1 capsule (300 mg) by mouth daily as needed    Hip pain, left, Lumbar  radicular pain       levothyroxine 75 MCG tablet    SYNTHROID/LEVOTHROID    90 tablet    Take 1 tablet (75 mcg) by mouth daily    Other specified hypothyroidism       metoprolol succinate 25 MG 24 hr tablet    TOPROL-XL    90 tablet    Take 1 tablet (25 mg) by mouth daily    ACS (acute coronary syndrome) (H)       MULTIVITAMIN GUMMIES ADULT PO           order for DME     1 each    Equipment being ordered: Nebulizer Fax order to [facility] [phone]    COPD exacerbation (H)       traZODone 50 MG tablet    DESYREL    30 tablet    Take 1 tablet (50 mg) by mouth nightly as needed for sleep    Insomnia, unspecified type       * Notice:  This list has 4 medication(s) that are the same as other medications prescribed for you. Read the directions carefully, and ask your doctor or other care provider to review them with you.

## 2018-07-05 ENCOUNTER — TELEPHONE (OUTPATIENT)
Dept: INTERNAL MEDICINE | Facility: CLINIC | Age: 83
End: 2018-07-05

## 2018-07-05 DIAGNOSIS — J44.1 COPD EXACERBATION (H): ICD-10-CM

## 2018-07-05 NOTE — TELEPHONE ENCOUNTER
[facility] sent fax regarding prescription for First-Lore's Mouthwash. They need to know what quantities to use for each component.

## 2018-07-06 NOTE — TELEPHONE ENCOUNTER
Magic Mouthwash (FV std formula) lidocaine visc 2% 2.5mL/5mL & maalox/mylanta w/ simeth 2.5mL/5mL & diphenhydrAMINE 5mg/5mL (Discontinued)   10/17/2014 11/11/2014 --      Sig - Route: Swish and swallow 10 mLs in mouth every 6 hours as needed for mouth sores - Swish & Swallow     Class: Transitional     Reason for Discontinue: Not Needed     reordered

## 2018-07-07 NOTE — TELEPHONE ENCOUNTER
[facility] called asking for the ratio. Informed them IM is closed and will call back on Monday.   [phone]

## 2018-07-09 ENCOUNTER — TELEPHONE (OUTPATIENT)
Dept: INTERNAL MEDICINE | Facility: CLINIC | Age: 83
End: 2018-07-09

## 2018-07-30 ENCOUNTER — TRANSFERRED RECORDS (OUTPATIENT)
Dept: HEALTH INFORMATION MANAGEMENT | Facility: CLINIC | Age: 83
End: 2018-07-30

## 2018-08-03 ENCOUNTER — TRANSFERRED RECORDS (OUTPATIENT)
Dept: HEALTH INFORMATION MANAGEMENT | Facility: CLINIC | Age: 83
End: 2018-08-03

## 2018-08-17 ENCOUNTER — TELEPHONE (OUTPATIENT)
Dept: INTERNAL MEDICINE | Facility: CLINIC | Age: 83
End: 2018-08-17

## 2018-08-17 NOTE — TELEPHONE ENCOUNTER
"Patient calling.  C/o facial swelling, tongue, and lips swollen.  Also states her tongue is painful--\"like I have thrush\".  Symptoms started 8-16-18.  C/o chest pain when taking a breath today.      Had a little difficulty breathing this am so she did an albuterol neb.  Helped somewhat.    Advised Patient to go to ER for eval today.  States she will call her daughter to take her.  "

## 2018-08-19 DIAGNOSIS — R60.0 LOCALIZED EDEMA: ICD-10-CM

## 2018-08-20 ENCOUNTER — TELEPHONE (OUTPATIENT)
Dept: INTERNAL MEDICINE | Facility: CLINIC | Age: 83
End: 2018-08-20

## 2018-08-20 NOTE — TELEPHONE ENCOUNTER
"Requested Prescriptions   Pending Prescriptions Disp Refills     furosemide (LASIX) 20 MG tablet [Pharmacy Med Name: Furosemide Oral Tablet 20 MG]  Last Written Prescription Date:  2/22/2018  Last Fill Quantity: 2,  # refills:    Last office visit: 7/2/2018 with prescribing provider:     Future Office Visit:   90 tablet 1     Sig: TAKE ONE TABLET BY MOUTH ONE TIME DAILY    Diuretics (Including Combos) Protocol Passed    8/19/2018 10:09 AM       Passed - Blood pressure under 140/90 in past 12 months    BP Readings from Last 3 Encounters:   07/02/18 128/70   05/24/18 133/83   04/24/18 106/58                Passed - Recent (12 mo) or future (30 days) visit within the authorizing provider's specialty    Patient had office visit in the last 12 months or has a visit in the next 30 days with authorizing provider or within the authorizing provider's specialty.  See \"Patient Info\" tab in inbasket, or \"Choose Columns\" in Meds & Orders section of the refill encounter.           Passed - Patient is age 18 or older       Passed - No active pregancy on record       Passed - Normal serum creatinine on file in past 12 months    Recent Labs   Lab Test  03/12/18   1009   CR  1.16*             Passed - Normal serum potassium on file in past 12 months    Recent Labs   Lab Test  03/12/18   1009   POTASSIUM  4.5                   Passed - Normal serum sodium on file in past 12 months    Recent Labs   Lab Test  03/12/18   1009   NA  139             Passed - No positive pregnancy test in past 12 months        "

## 2018-08-20 NOTE — TELEPHONE ENCOUNTER
FYI~ A refill has been made to the  assistance programs for Proventil HFA and Symbicort.    A 90 day supply of PROVENTIL HFA AND SYMBICORT will be delivered to Marie's home within 7-10 business days.    Thank you,    Vandana Gonzalez  Prescription Assistance

## 2018-08-23 ENCOUNTER — TELEPHONE (OUTPATIENT)
Dept: INTERNAL MEDICINE | Facility: CLINIC | Age: 83
End: 2018-08-23

## 2018-08-23 RX ORDER — FUROSEMIDE 20 MG
TABLET ORAL
Qty: 90 TABLET | Refills: 1 | Status: SHIPPED | OUTPATIENT
Start: 2018-08-23 | End: 2019-02-19

## 2018-08-23 NOTE — TELEPHONE ENCOUNTER
Routing refill request to provider for review/approval because:  Labs out of range:  Creat    Please advise, thanks.

## 2018-08-23 NOTE — TELEPHONE ENCOUNTER
Patient transferred from phone room.    Marie Kline is a 86 year old female who calls with bright red blood after having bowel movement this morning. Reports bloating for a month.     NURSING ASSESSMENT:  Description:  Bright red blood after having bowel movement today which has now stopped, and bloating for 1 month. Denies abdominal pain, changes in bowel regimen, dark/black stool or history of hemorrhoids.   Onset/duration:  Blood today only, bloating x1 month with no gas.  Precip. factors:  None  Associated symptoms:  Bloating  Improves/worsens symptoms:  None  Pain scale (0-10)   0/10  Allergies:   Allergies   Allergen Reactions     Contrast Dye Hives     CT DYE       MEDICATIONS:   Taking medication(s) as prescribed? Yes  Taking over the counter medication(s?) No  Any medication side effects? No significant side effects    Any barriers to taking medication(s) as prescribed?  No  Medication(s) improving/managing symptoms?  Yes  Medication reconciliation completed: N/A      NURSING PLAN: Routed to provider Yes    RECOMMENDED DISPOSITION:  Home care advice - continue to monitor and call back if ongoing blood with stool or changes to bowel regimen.    Will comply with recommendation: Yes  If further questions/concerns or if symptoms do not improve, worsen or new symptoms develop, call your PCP or [facility] as soon as possible.      Guideline used:  Telephone Triage Protocols for Nurses, Fourth Edition, Sunita Macario Obi, RN

## 2018-08-24 ENCOUNTER — OFFICE VISIT (OUTPATIENT)
Dept: INTERNAL MEDICINE | Facility: CLINIC | Age: 83
End: 2018-08-24
Payer: MEDICARE

## 2018-08-24 ENCOUNTER — TELEPHONE (OUTPATIENT)
Dept: INTERNAL MEDICINE | Facility: CLINIC | Age: 83
End: 2018-08-24

## 2018-08-24 VITALS
HEIGHT: 59 IN | HEART RATE: 72 BPM | TEMPERATURE: 98 F | DIASTOLIC BLOOD PRESSURE: 70 MMHG | SYSTOLIC BLOOD PRESSURE: 110 MMHG | BODY MASS INDEX: 21.17 KG/M2 | WEIGHT: 105 LBS | OXYGEN SATURATION: 92 %

## 2018-08-24 DIAGNOSIS — K64.4 EXTERNAL HEMORRHOIDS: ICD-10-CM

## 2018-08-24 DIAGNOSIS — R31.9 HEMATURIA, UNSPECIFIED TYPE: ICD-10-CM

## 2018-08-24 DIAGNOSIS — K92.1 BLOOD IN STOOL: Primary | ICD-10-CM

## 2018-08-24 DIAGNOSIS — B37.0 THRUSH: ICD-10-CM

## 2018-08-24 DIAGNOSIS — J44.1 COPD EXACERBATION (H): ICD-10-CM

## 2018-08-24 DIAGNOSIS — R04.0 EPISTAXIS: ICD-10-CM

## 2018-08-24 LAB
ALBUMIN UR-MCNC: ABNORMAL MG/DL
APPEARANCE UR: CLEAR
BILIRUB UR QL STRIP: NEGATIVE
COLOR UR AUTO: YELLOW
DIFFERENTIAL METHOD BLD: ABNORMAL
EOSINOPHIL # BLD AUTO: 0.7 10E9/L (ref 0–0.7)
EOSINOPHIL NFR BLD AUTO: 1 %
ERYTHROCYTE [DISTWIDTH] IN BLOOD BY AUTOMATED COUNT: 16.2 % (ref 10–15)
GLUCOSE UR STRIP-MCNC: NEGATIVE MG/DL
HCT VFR BLD AUTO: 38.2 % (ref 35–47)
HGB BLD-MCNC: 12.2 G/DL (ref 11.7–15.7)
HGB UR QL STRIP: NEGATIVE
INR PPP: 1 (ref 0.86–1.14)
KETONES UR STRIP-MCNC: NEGATIVE MG/DL
LEUKOCYTE ESTERASE UR QL STRIP: NEGATIVE
LYMPHOCYTES # BLD AUTO: 67.2 10E9/L (ref 0.8–5.3)
LYMPHOCYTES NFR BLD AUTO: 95 %
MCH RBC QN AUTO: 30.2 PG (ref 26.5–33)
MCHC RBC AUTO-ENTMCNC: 31.9 G/DL (ref 31.5–36.5)
MCV RBC AUTO: 95 FL (ref 78–100)
MONOCYTES # BLD AUTO: 0.7 10E9/L (ref 0–1.3)
MONOCYTES NFR BLD AUTO: 1 %
NEUTROPHILS # BLD AUTO: 2.1 10E9/L (ref 1.6–8.3)
NEUTROPHILS NFR BLD AUTO: 3 %
NITRATE UR QL: NEGATIVE
PH UR STRIP: 7.5 PH (ref 5–7)
PLATELET # BLD AUTO: 97 10E9/L (ref 150–450)
RBC # BLD AUTO: 4.04 10E12/L (ref 3.8–5.2)
RBC #/AREA URNS AUTO: NORMAL /HPF
RBC MORPH BLD: ABNORMAL
SOURCE: ABNORMAL
SP GR UR STRIP: 1.01 (ref 1–1.03)
UROBILINOGEN UR STRIP-ACNC: 0.2 EU/DL (ref 0.2–1)
WBC # BLD AUTO: 70.7 10E9/L (ref 4–11)
WBC #/AREA URNS AUTO: NORMAL /HPF

## 2018-08-24 PROCEDURE — 85610 PROTHROMBIN TIME: CPT | Performed by: INTERNAL MEDICINE

## 2018-08-24 PROCEDURE — 36415 COLL VENOUS BLD VENIPUNCTURE: CPT | Performed by: INTERNAL MEDICINE

## 2018-08-24 PROCEDURE — 85025 COMPLETE CBC W/AUTO DIFF WBC: CPT | Performed by: INTERNAL MEDICINE

## 2018-08-24 PROCEDURE — 81001 URINALYSIS AUTO W/SCOPE: CPT | Performed by: INTERNAL MEDICINE

## 2018-08-24 PROCEDURE — 80048 BASIC METABOLIC PNL TOTAL CA: CPT | Performed by: INTERNAL MEDICINE

## 2018-08-24 PROCEDURE — 99214 OFFICE O/P EST MOD 30 MIN: CPT | Performed by: INTERNAL MEDICINE

## 2018-08-24 NOTE — MR AVS SNAPSHOT
After Visit Summary   8/24/2018    Marie Kline    MRN: [id number]           Patient Information     Date Of Birth          4/28/1932        Visit Information        Provider Department      8/24/2018 10:20 AM Piper Kiser MD [facility]        Today's Diagnoses     Blood in stool    -  1    Epistaxis        Thrush        Hematuria, unspecified type           Follow-ups after your visit        Additional Services     COLORECTAL SURGERY REFERRAL       Your provider has referred you to: [facility] [phone]   [email or web address]    Referral Reason(s): Rectal Bleeding  Special Concerns: None  This referral is: Elective (week +)  It is OK to leave a message on patient's voicemail.    Please be aware that coverage of these services is subject to the terms and limitations of your health insurance plan.  Call member services at your health plan with any benefit or coverage questions.      Please bring the following with you to your appointment:    (1) Any X-Rays, CTs or MRIs which have been performed.  Contact the facility where they were done to arrange for  prior to your scheduled appointment.    (2) List of current medications  (3) This referral request   (4) Any documents/labs given to you for this referral                  Your next 10 appointments already scheduled     Oct 11, 2018 11:10 AM CDT   Cystoscopy with Kar Nuñez MD, UB CYF   [facility] ([facility])    [address]  [address]  [facility] [address] [phone]              Who to contact     If you have questions or need follow up information about today's clinic visit or your schedule please contact [facility] directly at [phone].  Normal or non-critical lab and imaging results will be communicated to you by MyChart, letter or phone within 4 business  "days after the clinic has received the results. If you do not hear from us within 7 days, please contact the clinic through Driblet or phone. If you have a critical or abnormal lab result, we will notify you by phone as soon as possible.  Submit refill requests through Driblet or call your pharmacy and they will forward the refill request to us. Please allow 3 business days for your refill to be completed.          Additional Information About Your Visit        Driblet Information     Driblet gives you secure access to your electronic health record. If you see a primary care provider, you can also send messages to your care team and make appointments. If you have questions, please call your primary care clinic.  If you do not have a primary care provider, please call [phone] and they will assist you.        Care EveryWhere ID     This is your Care EveryWhere ID. This could be used by other organizations to access your [facility] medical records  [id number]        Your Vitals Were     Pulse Temperature Height Pulse Oximetry Breastfeeding? BMI (Body Mass Index)    72 98  F (36.7  C) (Oral) 4' 11\" (1.499 m) 92% No 21.21 kg/m2       Blood Pressure from Last 3 Encounters:   08/24/18 110/70   07/02/18 128/70   05/24/18 133/83    Weight from Last 3 Encounters:   08/24/18 105 lb (47.6 kg)   07/02/18 102 lb (46.3 kg)   05/15/18 106 lb (48.1 kg)              We Performed the Following     *UA reflex to Microscopic and Culture ([facility] [place] [facility])     Basic metabolic panel     CBC with platelets differential     COLORECTAL SURGERY REFERRAL     INR          Today's Medication Changes          These changes are accurate as of 8/24/18 12:48 PM.  If you have any questions, ask your nurse or doctor.               These medicines have changed or have updated prescriptions.        Dose/Directions    * FIRST-HANS MOUTHWASH Susp   This may have changed:  Another medication with the same name was added. Make sure you understand " how and when to take each.   Used for:  Thrush   Changed by:  Piper Kiser MD        Dose:  5-10 mL   Swish and swallow 5-10 mLs in mouth every 6 hours as needed   Quantity:  237 mL   Refills:  1       * FIRST-HANS MOUTHWASH Susp   This may have changed:  Another medication with the same name was added. Make sure you understand how and when to take each.   Used for:  Thrush   Changed by:  Piper Kiser MD        Dose:  5-10 mL   Swish and swallow 5-10 mLs in mouth every 6 hours as needed   Quantity:  237 mL   Refills:  1       * FIRST-HANS MOUTHWASH Susp   This may have changed:  You were already taking a medication with the same name, and this prescription was added. Make sure you understand how and when to take each.   Used for:  Thrush   Changed by:  Piper Kiser MD        Dose:  5-10 mL   Swish and swallow 5-10 mLs in mouth every 6 hours as needed   Quantity:  237 mL   Refills:  1       furosemide 20 MG tablet   Commonly known as:  LASIX   This may have changed:  Another medication with the same name was removed. Continue taking this medication, and follow the directions you see here.   Used for:  Localized edema   Changed by:  Piper Kiser MD        TAKE ONE TABLET BY MOUTH ONE TIME DAILY   Quantity:  90 tablet   Refills:  1       traZODone 50 MG tablet   Commonly known as:  DESYREL   This may have changed:  how much to take   Used for:  Insomnia, unspecified type        Dose:  50 mg   Take 1 tablet (50 mg) by mouth nightly as needed for sleep   Quantity:  30 tablet   Refills:  0       * Notice:  This list has 3 medication(s) that are the same as other medications prescribed for you. Read the directions carefully, and ask your doctor or other care provider to review them with you.         Where to get your medicines      These medications were sent to [facility] - [address] [address]     Phone:  [phone]      [facility]                Primary Care Provider Office Phone # Fax #    Piper Catracho Kiser [phone]       [address] [facility] [address]        Equal Access to Services     RONNA ROBBINS : Hadjignesh juan ku rosao Somayali, waaxda luqadaha, qaybta kaalmada adeegyada, zoe sanchezn lucerokylee chow olena sykes. So [facility] [phone].    ATENCIÓN: Si habla español, tiene a eaton disposición servicios gratuitos de asistencia lingüística. Llame al [phone].    We comply with applicable federal civil rights laws and [place] laws. We do not discriminate on the basis of race, color, national origin, age, disability, sex, sexual orientation, or gender identity.            Thank you!     Thank you for choosing [facility]  for your care. Our goal is always to provide you with excellent care. Hearing back from our patients is one way we can continue to improve our services. Please take a few minutes to complete the written survey that you may receive in the mail after your visit with us. Thank you!             Your Updated Medication List - Protect others around you: Learn how to safely use, store and throw away your medicines at www.disposemymeds.org.          This list is accurate as of 8/24/18 12:48 PM.  Always use your most recent med list.                   Brand Name Dispense Instructions for use Diagnosis    * albuterol (2.5 MG/3ML) 0.083% neb solution      Take 1 vial by nebulization 2 times daily        * albuterol 108 (90 Base) MCG/ACT inhaler    PROAIR HFA/PROVENTIL HFA/VENTOLIN HFA    1 Inhaler    Inhale 2 puffs into the lungs every 6 hours as needed for wheezing    Intermittent asthma without complication       allopurinol 100 MG tablet    ZYLOPRIM          atorvastatin 20 MG tablet    LIPITOR    90 tablet    Take 1 tablet (20 mg) by mouth daily    ACS (acute coronary syndrome) (H), Hyperlipidemia with target LDL less than 130       budesonide-formoterol  80-4.5 MCG/ACT Inhaler    SYMBICORT    3 Inhaler    Inhale 2 puffs into the lungs 2 times daily    COPD exacerbation (H)       denosumab 60 MG/ML Soln injection    PROLIA    1 mL    Inject 1 mL (60 mg) Subcutaneous every 6 months    Senile osteoporosis, CKD (chronic kidney disease) stage 3, GFR 30-59 ml/min       ferrous sulfate 325 (65 Fe) MG tablet    IRON     Take by mouth daily (with breakfast)        * FIRST-HANS MOUTHWASH Susp     237 mL    Swish and swallow 5-10 mLs in mouth every 6 hours as needed    Thrush       * FIRST-HANS MOUTHWASH Susp     237 mL    Swish and swallow 5-10 mLs in mouth every 6 hours as needed    Thrush       * FIRST-HANS MOUTHWASH Susp     237 mL    Swish and swallow 5-10 mLs in mouth every 6 hours as needed    Thrush       furosemide 20 MG tablet    LASIX    90 tablet    TAKE ONE TABLET BY MOUTH ONE TIME DAILY    Localized edema       gabapentin 300 MG capsule    NEURONTIN    90 capsule    Take 1 capsule (300 mg) by mouth daily as needed    Hip pain, left, Lumbar radicular pain       levothyroxine 75 MCG tablet    SYNTHROID/LEVOTHROID    90 tablet    Take 1 tablet (75 mcg) by mouth daily    Other specified hypothyroidism       lidocaine visc 2% 2.5mL/5mL & maalox/mylanta w/ simeth 2.5mL/5mL & diphenhydrAMINE 5mg/5mL Susp suspension    [facility] Mouthwash [facility]    120 mL    Magic Mouthwash  lidocaine visc 2% 2.5mL/5mL & maalox/mylanta w/ simeth 2.5mL/5mL & diphenhydrAMINE 5mg/5mL    COPD exacerbation (H)       metoprolol succinate 25 MG 24 hr tablet    TOPROL-XL    90 tablet    Take 1 tablet (25 mg) by mouth daily    ACS (acute coronary syndrome) (H)       MULTIVITAMIN GUMMIES ADULT PO           order for DME     1 each    Equipment being ordered: Nebulizer Fax order to [facility] [phone]    COPD exacerbation (H)       traZODone 50 MG tablet    DESYREL    30 tablet    Take 1 tablet (50 mg) by mouth nightly as needed for sleep    Insomnia, unspecified type       *  Notice:  This list has 5 medication(s) that are the same as other medications prescribed for you. Read the directions carefully, and ask your doctor or other care provider to review them with you.

## 2018-08-24 NOTE — TELEPHONE ENCOUNTER
Left v/m for pt letting her know that we can see her today at 1020/1140/1200, I put her in the 1020 spot for now. Please just manisha when she'll be in if she calls back if not going to be at 1020

## 2018-08-24 NOTE — NURSING NOTE
"/70 (BP Location: Left arm, Cuff Size: Adult Regular)  Pulse 72  Temp 98  F (36.7  C) (Oral)  Ht 4' 11\" (1.499 m)  Wt 105 lb (47.6 kg)  SpO2 92%  Breastfeeding? No  BMI 21.21 kg/m2    "

## 2018-08-24 NOTE — TELEPHONE ENCOUNTER
Please have patient schedule an appointment.   I can fit her in on Monday afternoon if she is able or Friday at noon.     Or she can see a colleague, as I will be away at a family wedding for one week.

## 2018-08-24 NOTE — TELEPHONE ENCOUNTER
Patient calls back, she can come in at 12 today. Note added to appointment that patient will come in at 12.

## 2018-08-24 NOTE — PROGRESS NOTES
"    SUBJECTIVE:   Marie Kline is a 86 year old female who presents to clinic today for the following health issues:      ED/UC Followup:    Facility:  [facility]  Date of visit: 8/17/18  Reason for visit: allergic reaction  Current Status: better     She has had rectal bleeding the past 2 days. She is not sure if it was the urine. Of note, she has a history of bladder cancer.  She also has had some bloating. She reports that she feels pregnant.  Her stool has been normal.  No fevers or chills.  No blood in stool in the past. She has had regular colonoscopies in the past.     The patient reports that she has had a nose bleed.     She has had spots on her left leg.     CLL.  She is on monthly IVIG. She had seen Dr. Ivory 8/1/18.   She follows up in 10/22/18.      H/o bladder cancer.  She has not had recurrence since 2016.    She sees Dr. Nuñez.             Problem list and histories reviewed & adjusted, as indicated.      Reviewed and updated as needed this visit by clinical staff  Tobacco  Med Hx  Surg Hx  Fam Hx  Soc Hx      Reviewed and updated as needed this visit by Provider         ROS:  CONSTITUTIONAL: NEGATIVE for fever, chills, change in weight  RESP: NEGATIVE for significant cough or SOB  CV: NEGATIVE for chest pain, palpitations or peripheral edema  GI/: see above HPI    OBJECTIVE:     /70 (BP Location: Left arm, Cuff Size: Adult Regular)  Pulse 72  Temp 98  F (36.7  C) (Oral)  Ht 4' 11\" (1.499 m)  Wt 105 lb (47.6 kg)  SpO2 92%  Breastfeeding? No  BMI 21.21 kg/m2  Body mass index is 21.21 kg/(m^2).  GENERAL: healthy, alert and no distress  RESP: lungs clear to auscultation - no rales, rhonchi or wheezes  CV: regular rate and rhythm, normal S1 S2, no S3 or S4, no murmur, click or rub  GI: no abdominal pain upon palpation  : external hemorrhoids appreciated, not actively bleeding    ASSESSMENT/PLAN:       (K92.1) Blood in stool  (primary encounter diagnosis)  Comment: possibly " from hemorrhoids  Plan: CBC with platelets differential, INR,         COLORECTAL SURGERY REFERRAL, Basic metabolic         panel, Urine Microscopic            (R04.0) Epistaxis  Comment:   Plan: CBC with platelets differential, INR            (B37.0) Thrush  Comment:   Plan: Diphenhyd-HC-Nystatin-Tetracyc (FIRST-HANS         MOUTHWASH) SUSP, magic mouthwash (ENTER         INGREDIENTS IN COMMENTS) suspension            (R31.9) Hematuria, unspecified type  Comment:  Plan: Basic metabolic panel, UA reflex to Microscopic        and Culture, CANCELED: *UA reflex to         Microscopic and Culture (Cami Devine and OLIVE)       -pt to follow up with urology in October as planned; sooner if need be, pending on course (althought patient believes this is more rectal bleeding)    (K64.4) External hemorrhoids  Comment:   Plan: hydrocortisone (ANUSOL-HC) 2.5 % cream            (J44.1) COPD exacerbation (H)  Comment:   Plan: magic mouthwash suspension (diphenhydrAMINE,         lidocaine, aluminum-magnesium & simethicone)                  Piper Kiser MD  [facility]

## 2018-08-25 LAB
ANION GAP SERPL CALCULATED.3IONS-SCNC: 2 MMOL/L (ref 3–14)
BUN SERPL-MCNC: 14 MG/DL (ref 7–30)
CALCIUM SERPL-MCNC: 8.1 MG/DL (ref 8.5–10.1)
CHLORIDE SERPL-SCNC: 103 MMOL/L (ref 94–109)
CO2 SERPL-SCNC: 35 MMOL/L (ref 20–32)
CREAT SERPL-MCNC: 1.01 MG/DL (ref 0.52–1.04)
GFR SERPL CREATININE-BSD FRML MDRD: 52 ML/MIN/1.7M2
GLUCOSE SERPL-MCNC: 87 MG/DL (ref 70–99)
POTASSIUM SERPL-SCNC: 4.5 MMOL/L (ref 3.4–5.3)
SODIUM SERPL-SCNC: 140 MMOL/L (ref 133–144)

## 2018-08-26 ENCOUNTER — MYC MEDICAL ADVICE (OUTPATIENT)
Dept: NEUROSURGERY | Facility: CLINIC | Age: 83
End: 2018-08-26

## 2018-08-26 DIAGNOSIS — M54.16 LUMBAR RADICULAR PAIN: Primary | ICD-10-CM

## 2018-08-27 ENCOUNTER — MYC MEDICAL ADVICE (OUTPATIENT)
Dept: INTERNAL MEDICINE | Facility: CLINIC | Age: 83
End: 2018-08-27

## 2018-08-27 ENCOUNTER — TELEPHONE (OUTPATIENT)
Dept: PALLIATIVE MEDICINE | Facility: CLINIC | Age: 83
End: 2018-08-27

## 2018-08-27 DIAGNOSIS — Z83.2: Primary | ICD-10-CM

## 2018-08-27 NOTE — TELEPHONE ENCOUNTER
Pre-screening Questions for Radiology Injections:    Injection to be done at which interventional clinic site? [facility]    Instruct patient to arrive as directed prior to the scheduled appointment time:    [place] minutes before      [place]: 1 hour before     Procedure ordered by Ariadna Cassidy    Procedure ordered? Lumbar Epidural Steroid Injection    What insurance would patient like us to bill for this procedure? Medicare/BC      Worker's comp or MVA (motor vehicle accident) -Any injection DO NOT SCHEDULE and route to Bettie Ponce.      [employer] - For SI joint injections, DO NOT SCHEDULE and route Hanny Marroquin. [employer] NO PA REQUIRED EFFECTIVE 2017      [employer]- MBB's must be scheduled at LEAST two weeks apart      [employer] - Any injection besides hip/shoulder/knee joint DO NOT SCHEDULE and route to Hanny Marroquin. She will obtain PA and call pt back to schedule procedure or notify pt of denial.       [employer]-Route to Hanny for review    Any chance of pregnancy? NO   If YES, do NOT schedule and route to RN pool    Is an  needed? No     Patient has a drive home? (mandatory) YES: Informed    Is patient taking any blood thinners (plavix, coumadin, jantoven, warfarin, heparin, pradaxa or dabigatran )? No   If hold needed, do NOT schedule, route to RN pool     Is patient taking any aspirin products? No     If more than 325mg/day do NOT schedule; route to RN pool     For CERVICAL procedures, hold all aspirin products for 6 days.      Does the patient have a bleeding or clotting disorder? No     If YES, okay to schedule AND route to RN nurse pool    **For any patients with platelet count <100, must be forwarded to provider**    Is patient diabetic?  No  If YES, have them bring their glucometer.    Does patient have an active infection or treated for one within the past week? No     Is patient currently taking any antibiotics?  No     For patients on  chronic, preventative, or prophylactic antibiotics, procedures may be scheduled.     For patients on antibiotics for active or recent infection:    Jose Valentino Burton, [place]-antibiotic course must have been completed for 4 days    Is patient currently taking any steroid medications? (i.e. Prednisone, Medrol)  No     For patients on steroid medications:    Jose Valentino Burton, [place]-steroid course must have been completed for 4 days    Reviewed with patient:  If you are started on any steroids or antibiotics between now and your appointment, you must contact us because it may affect our ability to perform your procedure.  Yes    Is patient actively being treated for cancer or immunocompromised? Yes - Leukemia  If YES, do NOT schedule and route to RN pool     Are you able to get on and off an exam table with minimal or no assistance? Yes  If NO, do NOT schedule and route to RN pool    Are you able to roll over and lay on your stomach with minimal or no assistance? Yes  If NO, do NOT schedule and route to RN pool     Any allergies to contrast dye, iodine, shellfish, or numbing and steroid medications? Yes - Contrast Dye  If YES, route to RN pool AND add allergy information to appointment notes    Allergies: Contrast dye      Has the patient had a flu shot or any other vaccinations within 7 days before or after the procedure.  No     Does patient have an MRI/CT?  YES: MRI  (SI joint, hip injections, lumbar sympathetic blocks, and stellate ganglion blocks do not require an MRI)    Was the MRI done w/in the last 3 years?  Yes    Was MRI done at [place]? Yes      If not, where was it done? N/A       If MRI was not done at [place], [facility] or [facility] do NOT schedule and route to nursing.  If pt has an imaging disc, the injection may be scheduled but pt has to bring disc to appt. If they show up w/out disc the injection cannot be done    Reminders (please tell patient if applicable):        Instructed pt to arrive 30 minutes early for IV start if this is for a cervical procedure, ALL sympathetic (stellate ganglion, hypogastric, or lumbar sympathetic block) and all sedation procedures (RFA, spinal cord stimulation trials).  Not Applicable   -IVs are not routinely placed for Dr. Gonsalez cervical cases   -Dr. Brown: IVs for cervical ESIs and cervical TBDs (not CMBBs/facet inj)      If NPO for sedation, informed patient that it is okay to take medications with sips of water (except if they are to hold blood thinners).  Not Applicable   *DO take blood pressure medication if it is prescribed*      If this is for a cervical LEATHA, informed patient that aspirin needs to be held for 6 days.   Not Applicable      For all patients not having spinal cord stimulator (SCS) trials or radiofrequency ablations (RFAs), informed patient:    IV sedation is not provided for this procedure.  If you feel that an oral anti-anxiety medication is needed, you can discuss this further with your referring provider or primary care provider.  The Pain Clinic provider will discuss specifics of what the procedure includes at your appointment.  Most procedures last 10-20 minutes.  We use numbing medications to help with any discomfort during the procedure.  Not Applicable      Do not schedule procedures requiring IV placement in the first appointment of the day or first appointment after lunch. Do NOT schedule at 0745, 0815 or 1245.       For patients 85 or older we recommend having an adult stay w/ them for the remainder of the day.       Does the patient have any questions?  Not Applicable  Guadalupe Sahni  [facility]

## 2018-08-27 NOTE — TELEPHONE ENCOUNTER
Pt contacted about POC.  MRI shows stenosis are arthritis.  EMG normal. No surgery recommended and pt does not want surgery.  Recc. Repeat injection.

## 2018-08-28 NOTE — TELEPHONE ENCOUNTER
Patient is scheduled with Dr. Gonsalez on 9/12 at 10:15AM. Advised that she would need to do a platelet count before the procedure and advised that nursing would contact to let her know when to show up for that.       Sussy Munoz    [facility]

## 2018-08-28 NOTE — TELEPHONE ENCOUNTER
Last platelet count was 94k. Patient has hx of leukemia. Is the ok to schedule?    Margarita GAINESN-RN Care Coordinator  [facility]

## 2018-08-28 NOTE — TELEPHONE ENCOUNTER
Go ahead and schedule and we will plan to do a platelet count the day of the procedure.  Please notify the patient that it will need to be more than 100 in order to proceed with the epidural.      Jeaneth Gonsalez MD

## 2018-08-29 ENCOUNTER — MYC MEDICAL ADVICE (OUTPATIENT)
Dept: INTERNAL MEDICINE | Facility: CLINIC | Age: 83
End: 2018-08-29

## 2018-08-29 NOTE — TELEPHONE ENCOUNTER
Patient returning call for nurses, please pt to advise [phone]      Ed ADAM    [facility]

## 2018-08-29 NOTE — TELEPHONE ENCOUNTER
Called patient to discuss her platelet draw process on the day of injection. LVM for patient to call back.    Margarita GAINESN-RN Care Coordinator  [facility]

## 2018-08-29 NOTE — TELEPHONE ENCOUNTER
Called patient to let her know to come early and get her platelet count drawn at the hospital prior to injection.    Margarita GAINESN-RN Care Coordinator  [facility]

## 2018-08-30 NOTE — TELEPHONE ENCOUNTER
She is referring to the 8/28/18 MyChart encounter regarding her URI symptoms. Was advised that she needs appointment for this.     Will route to provider pool since it's almost the weekend.

## 2018-08-31 NOTE — TELEPHONE ENCOUNTER
Agree--recommend that she schedule an office appointment if symptoms continuing to be bothersome to her.

## 2018-09-05 ENCOUNTER — OFFICE VISIT (OUTPATIENT)
Dept: INTERNAL MEDICINE | Facility: CLINIC | Age: 83
End: 2018-09-05
Payer: MEDICARE

## 2018-09-05 ENCOUNTER — HOSPITAL ENCOUNTER (OUTPATIENT)
Dept: CT IMAGING | Facility: CLINIC | Age: 83
Discharge: HOME | End: 2018-09-05
Attending: FAMILY MEDICINE | Admitting: FAMILY MEDICINE
Payer: MEDICARE

## 2018-09-05 VITALS
SYSTOLIC BLOOD PRESSURE: 90 MMHG | RESPIRATION RATE: 14 BRPM | DIASTOLIC BLOOD PRESSURE: 56 MMHG | TEMPERATURE: 98.6 F | BODY MASS INDEX: 21.17 KG/M2 | WEIGHT: 105 LBS | HEIGHT: 59 IN | OXYGEN SATURATION: 91 % | HEART RATE: 63 BPM

## 2018-09-05 DIAGNOSIS — R14.0 ABDOMINAL BLOATING: ICD-10-CM

## 2018-09-05 DIAGNOSIS — K13.79 MOUTH SORES: Primary | ICD-10-CM

## 2018-09-05 DIAGNOSIS — J44.1 COPD EXACERBATION (H): ICD-10-CM

## 2018-09-05 DIAGNOSIS — C91.10 CLL (CHRONIC LYMPHOCYTIC LEUKEMIA) (H): ICD-10-CM

## 2018-09-05 PROCEDURE — 74176 CT ABD & PELVIS W/O CONTRAST: CPT

## 2018-09-05 PROCEDURE — 99214 OFFICE O/P EST MOD 30 MIN: CPT | Performed by: FAMILY MEDICINE

## 2018-09-05 RX ORDER — FLUCONAZOLE 150 MG/1
150 TABLET ORAL DAILY
Qty: 7 TABLET | Refills: 2 | Status: SHIPPED | OUTPATIENT
Start: 2018-09-05 | End: 2018-10-18

## 2018-09-05 RX ORDER — FLUCONAZOLE 150 MG/1
150 TABLET ORAL DAILY
Qty: 7 TABLET | Refills: 0 | Status: SHIPPED | OUTPATIENT
Start: 2018-09-05 | End: 2018-09-05

## 2018-09-05 NOTE — NURSING NOTE
"Chief Complaint   Patient presents with     Mass     pt c/o lumps on her rt side of neck and also rt side of groin. pt states feeling bloated. pt c/o a sore throat as well and trying to adjust to new inhaler.     initial BP 90/56  Pulse 63  Temp 98.6  F (37  C) (Oral)  Resp 14  Ht 4' 11\" (1.499 m)  Wt 105 lb (47.6 kg)  SpO2 91%  BMI 21.21 kg/m2 Estimated body mass index is 21.21 kg/(m^2) as calculated from the following:    Height as of this encounter: 4' 11\" (1.499 m).    Weight as of this encounter: 105 lb (47.6 kg)..  bp completed using cuff size regular  RODRÍGUEZ DOWLING LPN  "

## 2018-09-05 NOTE — PROGRESS NOTES
.  SUBJECTIVE:   Marie Kline is a 86 year old female who presents to clinic today for the following health issues:    CHIEF COMPLAINT    Tired, mouth sore, bloating.      HISTORY    She hasn't felt well for about 2 weeks. She is tired out. She has mouth soreness. Has cough and SOB. She feels bloated and appetite decreased.  Bowels moving OK. No vomiting.    She has COPD. Feels her breathing is OK.    H/O lymphoma and CHF noted.    Patient Active Problem List   Diagnosis     Acute and chronic respiratory failure with hypercapnia (HCC)     Nonischemic cardiomyopathy (H)     Pneumonia     Acute myocardial infarction, initial episode of care (HCC)     CLL (chronic lymphocytic leukemia) (HCC)     CHF (congestive heart failure) (HCC)     Cardiomyopathy in other diseases classified elsewhere ([facility])     Hyperlipidemia with target LDL less than 130     Health Care Home     COPD exacerbation (H)     LESLY (generalized anxiety disorder)     Hypothyroidism     Back pain with radiation     DDD (degenerative disc disease), lumbar     Splenomegaly     Lumbar degenerative disc disease     Lumbar foraminal stenosis     Central spinal stenosis     Bladder cancer (H)     Sepsis (H)     ACP (advance care planning)     Senile osteoporosis     CKD (chronic kidney disease) stage 3, GFR 30-59 ml/min     Purpura senilis (H)     Xerosis cutis     Malignant neoplasm of urinary bladder, unspecified site (H)     Hypoxia     Current Outpatient Prescriptions   Medication Sig Dispense Refill     albuterol (2.5 MG/3ML) 0.083% neb solution Take 1 vial by nebulization 2 times daily       albuterol (PROAIR HFA/PROVENTIL HFA/VENTOLIN HFA) 108 (90 BASE) MCG/ACT Inhaler Inhale 2 puffs into the lungs every 6 hours as needed for wheezing 1 Inhaler 8     atorvastatin (LIPITOR) 20 MG tablet Take 1 tablet (20 mg) by mouth daily 90 tablet 1     denosumab (PROLIA) 60 MG/ML SOLN injection Inject 1 mL (60 mg) Subcutaneous every 6 months 1 mL 0      "Diphenhyd-HC-Nystatin-Tetracyc (FIRST-HANS MOUTHWASH) SUSP Swish and swallow 5-10 mLs in mouth every 6 hours as needed 237 mL 1     ferrous sulfate (IRON) 325 (65 FE) MG tablet Take by mouth daily (with breakfast)       furosemide (LASIX) 20 MG tablet TAKE ONE TABLET BY MOUTH ONE TIME DAILY  90 tablet 1     gabapentin (NEURONTIN) 300 MG capsule Take 1 capsule (300 mg) by mouth daily as needed 90 capsule 3     levothyroxine (SYNTHROID/LEVOTHROID) 75 MCG tablet Take 1 tablet (75 mcg) by mouth daily 90 tablet 2     metoprolol succinate (TOPROL-XL) 25 MG 24 hr tablet Take 1 tablet (25 mg) by mouth daily 90 tablet 3     Multiple Vitamins-Minerals (MULTIVITAMIN GUMMIES ADULT PO)        order for DME Equipment being ordered: Nebulizer  Fax order to [facility] [phone] 1 each 0     traZODone (DESYREL) 50 MG tablet Take 1 tablet (50 mg) by mouth nightly as needed for sleep (Patient taking differently: Take 100 mg by mouth nightly as needed for sleep ) 30 tablet 0     budesonide-formoterol (SYMBICORT) 80-4.5 MCG/ACT Inhaler Inhale 2 puffs into the lungs 2 times daily (Patient not taking: Reported on 9/5/2018) 3 Inhaler 1         REVIEW OF SYSTEMS    No fever. No wt loss.  SOB and cough - known COPD  No CP  No urinary difficulty  No edema      Past Medical History:   Diagnosis Date     Bladder cancer (H)      Cardiomyopathy (H)      CLL (chronic lymphocytic leukemia) (H)      Congestive heart failure (H) 10/24/2014    flash pulm edema ass w/Takotsubo     COPD (chronic obstructive pulmonary disease) (H)     noc O2     Hypothyroid      Mumps      Myocardial infarction 10/2014    Takotsubo presentation w/mild CAD     Pulmonary edema cardiac cause (H) 10/08/2014    associated w/Takotsubo ACS     Tricuspid valve disorders, 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    TR 2-3+ per echo       EXAM  BP 90/56  Pulse 63  Temp 98.6  F (37  C) (Oral)  Resp 14  Ht 4' 11\" (1.499 m)  Wt 105 lb (47.6 kg)  SpO2 91%  BMI 21.21 " kg/m2    Thin woman with cough.  HEENT some thrush R buccal mucosa  Neck no mass  Chest: prolonged expiration, few basilar rales  CV RSR with 2-3 / 6 SYMONE  Abd - LUQ mass palpable, tender  Legs no edema      (K13.79) Mouth sores  (primary encounter diagnosis)  Comment:   Plan: fluconazole (DIFLUCAN) 150 MG tablet,         DISCONTINUED: fluconazole (DIFLUCAN) 150 MG         tablet            (R14.0) Abdominal bloating  Comment:   Palpable mass  Plan: CT Abdomen Pelvis w/o Contrast            (C91.10) CLL (chronic lymphocytic leukemia) (HCC)  Comment: noted  Plan:     (J44.1) COPD exacerbation (H)  Comment:   Plan:

## 2018-09-05 NOTE — MR AVS SNAPSHOT
After Visit Summary   9/5/2018    Marie Kline    MRN: [id number]           Patient Information     Date Of Birth          4/28/1932        Visit Information        Provider Department      9/5/2018 10:00 AM Chato Huang MD [facility]        Today's Diagnoses     Mouth sores    -  1    Abdominal bloating        CLL (chronic lymphocytic leukemia) (HCC)        COPD exacerbation (H)          Care Instructions      Take prescribed medication as directed.    CT scan planned.          Follow-ups after your visit        Your next 10 appointments already scheduled     Sep 12, 2018 10:15 AM CDT   Radiology Injections with Jeaneth Gonsalez MD   [facility] ([facility])    [address]  [address]  [facility] [address]   [phone]            Oct 11, 2018 11:10 AM CDT   Cystoscopy with Kar Nuñez MD, UB CYF   [facility] ([facility])    [address]  [address]  [facility] [id number] [phone]              Future tests that were ordered for you today     Open Future Orders        Priority Expected Expires Ordered    CT Abdomen Pelvis w/o Contrast Routine  9/5/2019 9/5/2018            Who to contact     If you have questions or need follow up information about today's clinic visit or your schedule please contact [facility] directly at [phone].  Normal or non-critical lab and imaging results will be communicated to you by MyChart, letter or phone within 4 business days after the clinic has received the results. If you do not hear from us within 7 days, please contact the clinic through MyChart or phone. If you have a critical or abnormal lab result, we will notify you by phone as soon as possible.  Submit refill requests through [employer] or call your pharmacy and they will forward the refill request to us. Please allow 3 business days for  "your refill to be completed.          Additional Information About Your Visit        [employer] Information     [employer] gives you secure access to your electronic health record. If you see a primary care provider, you can also send messages to your care team and make appointments. If you have questions, please call your primary care clinic.  If you do not have a primary care provider, please call [phone] and they will assist you.        Care EveryWhere ID     This is your Care EveryWhere ID. This could be used by other organizations to access your [place] medical records  [id number]        Your Vitals Were     Pulse Temperature Respirations Height Pulse Oximetry BMI (Body Mass Index)    63 98.6  F (37  C) (Oral) 14 4' 11\" (1.499 m) 91% 21.21 kg/m2       Blood Pressure from Last 3 Encounters:   09/05/18 90/56   08/24/18 110/70   07/02/18 128/70    Weight from Last 3 Encounters:   09/05/18 105 lb (47.6 kg)   08/24/18 105 lb (47.6 kg)   07/02/18 102 lb (46.3 kg)                 Today's Medication Changes          These changes are accurate as of 9/5/18 10:40 AM.  If you have any questions, ask your nurse or doctor.               Start taking these medicines.        Dose/Directions    fluconazole 150 MG tablet   Commonly known as:  DIFLUCAN   Used for:  Mouth sores   Started by:  Chato Huang MD        Dose:  150 mg   Take 1 tablet (150 mg) by mouth daily   Quantity:  7 tablet   Refills:  2         These medicines have changed or have updated prescriptions.        Dose/Directions    traZODone 50 MG tablet   Commonly known as:  DESYREL   This may have changed:  how much to take   Used for:  Insomnia, unspecified type        Dose:  50 mg   Take 1 tablet (50 mg) by mouth nightly as needed for sleep   Quantity:  30 tablet   Refills:  0            Where to get your medicines      These medications were sent to [id number] - [place], [place] - [address]  [address] [address]     Phone:  " [phone]     fluconazole 150 MG tablet                Primary Care Provider Office Phone # Fax #    Piper Kiser [phone]       [address] [facility] [address]        Equal Access to Services     RONNA ROBBINS : Hadii juan ku hadevelioo Soomaali, waaxda luqadaha, qaybta kaalmada adesuzie, zoe sanchezn lucerokylee chow olena sykes. So [facility] [phone].    ATENCIÓN: Si habla español, tiene a eaton disposición servicios gratuitos de asistencia lingüística. Llame al [phone].    We comply with applicable federal civil rights laws and [place] laws. We do not discriminate on the basis of race, color, national origin, age, disability, sex, sexual orientation, or gender identity.            Thank you!     Thank you for choosing [facility]  for your care. Our goal is always to provide you with excellent care. Hearing back from our patients is one way we can continue to improve our services. Please take a few minutes to complete the written survey that you may receive in the mail after your visit with us. Thank you!             Your Updated Medication List - Protect others around you: Learn how to safely use, store and throw away your medicines at www.disposemymeds.org.          This list is accurate as of 9/5/18 10:40 AM.  Always use your most recent med list.                   Brand Name Dispense Instructions for use Diagnosis    * albuterol (2.5 MG/3ML) 0.083% neb solution      Take 1 vial by nebulization 2 times daily        * albuterol 108 (90 Base) MCG/ACT inhaler    PROAIR HFA/PROVENTIL HFA/VENTOLIN HFA    1 Inhaler    Inhale 2 puffs into the lungs every 6 hours as needed for wheezing    Intermittent asthma without complication       atorvastatin 20 MG tablet    LIPITOR    90 tablet    Take 1 tablet (20 mg) by mouth daily    ACS (acute coronary syndrome) (H), Hyperlipidemia with target LDL less than 130       budesonide-formoterol 80-4.5 MCG/ACT Inhaler     SYMBICORT    3 Inhaler    Inhale 2 puffs into the lungs 2 times daily    COPD exacerbation (H)       denosumab 60 MG/ML Soln injection    PROLIA    1 mL    Inject 1 mL (60 mg) Subcutaneous every 6 months    Senile osteoporosis, CKD (chronic kidney disease) stage 3, GFR 30-59 ml/min       ferrous sulfate 325 (65 Fe) MG tablet    IRON     Take by mouth daily (with breakfast)        FIRST-HANS MOUTHWASH Susp     237 mL    Swish and swallow 5-10 mLs in mouth every 6 hours as needed    Thrush       fluconazole 150 MG tablet    DIFLUCAN    7 tablet    Take 1 tablet (150 mg) by mouth daily    Mouth sores       furosemide 20 MG tablet    LASIX    90 tablet    TAKE ONE TABLET BY MOUTH ONE TIME DAILY    Localized edema       gabapentin 300 MG capsule    NEURONTIN    90 capsule    Take 1 capsule (300 mg) by mouth daily as needed    Hip pain, left, Lumbar radicular pain       levothyroxine 75 MCG tablet    SYNTHROID/LEVOTHROID    90 tablet    Take 1 tablet (75 mcg) by mouth daily    Other specified hypothyroidism       metoprolol succinate 25 MG 24 hr tablet    TOPROL-XL    90 tablet    Take 1 tablet (25 mg) by mouth daily    ACS (acute coronary syndrome) (H)       MULTIVITAMIN GUMMIES ADULT PO           order for DME     1 each    Equipment being ordered: Nebulizer Fax order to [facility] [phone]    COPD exacerbation (H)       traZODone 50 MG tablet    DESYREL    30 tablet    Take 1 tablet (50 mg) by mouth nightly as needed for sleep    Insomnia, unspecified type       * Notice:  This list has 2 medication(s) that are the same as other medications prescribed for you. Read the directions carefully, and ask your doctor or other care provider to review them with you.

## 2018-09-06 ENCOUNTER — TELEPHONE (OUTPATIENT)
Dept: INTERNAL MEDICINE | Facility: CLINIC | Age: 83
End: 2018-09-06

## 2018-09-06 NOTE — TELEPHONE ENCOUNTER
Patient is returning call from Dr. Kiser. Unsure what its in regard to, possible CT scan. Please call [phone], ok to leave a message.

## 2018-09-10 ENCOUNTER — TRANSFERRED RECORDS (OUTPATIENT)
Dept: HEALTH INFORMATION MANAGEMENT | Facility: CLINIC | Age: 83
End: 2018-09-10

## 2018-09-11 ENCOUNTER — TELEPHONE (OUTPATIENT)
Dept: INTERNAL MEDICINE | Facility: CLINIC | Age: 83
End: 2018-09-11

## 2018-09-11 NOTE — PROGRESS NOTES
The procedure was cancelled.  Patients platelet count today is 90 and I gave her the option of doing the procedure with the risks involved vs postponing it until her cancer workup is complete with the hope that her platelet count would be over 100 at that time and she elected to postpone.      Jeaneth Gonsalez MD

## 2018-09-11 NOTE — TELEPHONE ENCOUNTER
Bere with [facility] calls with question regarding Magic Mouthwash prescription ordered 8/24/18. Order on 8/24/18 was sent for following formulation: Hydrocortisone 100mg+tetracycline 1500 mg+nystatin susp 10ml qs to 240ml with Benadryl soln    She states in the past patient was getting the following formulation: Tetracycline 1000 mg; Hydrocortisone 40 mg, Nystatin 20 mL, Benadryl 160 ml - qs with water to 240 mL.    She asks if they should fill as written on 8/24/18 or fill formulation patient was previously receiving. Ok to call or send new order to the pharmacy.

## 2018-09-12 ENCOUNTER — RADIOLOGY INJECTION OFFICE VISIT (OUTPATIENT)
Dept: PALLIATIVE MEDICINE | Facility: CLINIC | Age: 83
End: 2018-09-12
Payer: MEDICARE

## 2018-09-12 ENCOUNTER — HOSPITAL ENCOUNTER (OUTPATIENT)
Dept: LAB | Facility: CLINIC | Age: 83
Discharge: HOME | End: 2018-09-12
Attending: ANESTHESIOLOGY | Admitting: ANESTHESIOLOGY
Payer: MEDICARE

## 2018-09-12 VITALS — OXYGEN SATURATION: 92 % | HEART RATE: 71 BPM | SYSTOLIC BLOOD PRESSURE: 121 MMHG | DIASTOLIC BLOOD PRESSURE: 62 MMHG

## 2018-09-12 DIAGNOSIS — Z83.2: ICD-10-CM

## 2018-09-12 DIAGNOSIS — Z53.9 ERRONEOUS ENCOUNTER--DISREGARD: Primary | ICD-10-CM

## 2018-09-12 LAB — PLATELET # BLD AUTO: 90 10E9/L (ref 150–450)

## 2018-09-12 PROCEDURE — 85049 AUTOMATED PLATELET COUNT: CPT | Performed by: ANESTHESIOLOGY

## 2018-09-12 PROCEDURE — 36415 COLL VENOUS BLD VENIPUNCTURE: CPT | Performed by: ANESTHESIOLOGY

## 2018-09-12 NOTE — NURSING NOTE
Pre-procedure Intake    Have you been fasting? NA    If yes, for how long?     Are you taking a prescribed blood thinner such as coumadin, Plavix, Xarelto?    No    If yes, when did you take your last dose?     Do you take aspirin?  No    If cervical procedure, have you held aspirin for 6 days?   NA    Do you have any allergies to contrast dye, iodine, steroid and/or numbing medications?  YES: contrast dye    Are you currently taking antibiotics or have an active infection?  NO    Have you had a fever/elevated temperature within the past week? NO    Are you currently taking oral steroids? NO    Do you have a ? Yes       Are you pregnant or breastfeeding?  Not Applicable    Are the vital signs normal?  Yes

## 2018-09-12 NOTE — MR AVS SNAPSHOT
After Visit Summary   9/12/2018    Marie Kline    MRN: [id number]           Patient Information     Date Of Birth          4/28/1932        Visit Information        Provider Department      9/12/2018 10:15 AM Jeaneth Gonsalez MD [facility]        Care Instructions    We have elected not to do the interventional procedure today after listening to the risks of the epidural with a platelet count of 90.     I am recommending you follow up with Ariadna Cassidy CNP in clinic.     Jeaneth Gonsalez MD           Follow-ups after your visit        Your next 10 appointments already scheduled     Sep 13, 2018  2:30 PM CDT   CT SOFT TISSUE NECK W/O CONTRAST with [address] ([facility])    [address]  [facility] [address] [phone]           How do I prepare for my exam? (Food and drink instructions) No Food and Drink Restrictions.  How do I prepare for my exam? (Other instructions) You do not need to do anything special to prepare for this exam. For a sinus scan: Use your nose spray (nasal decongestant spray) as directed.  What should I wear: Please wear loose clothing, such as a sweat suit or jogging clothes. Avoid snaps, zippers and other metal. We may ask you to undress and put on a hospital gown.  How long does the exam take: Most scans take less than 20 minutes.  What should I bring: Please bring any scans or X-rays taken at other hospitals, if similar tests were done. Also bring a list of your medicines, including vitamins, minerals and over-the-counter drugs. It is safest to leave personal items at home.  Do I need a : No  is needed.  What do I need to tell my doctor? Be sure to tell your doctor: * If you have any allergies. * If there s any chance you are pregnant. * If you are breastfeeding.  What should I do after the exam: No restrictions, You may resume normal activities.  What is this test: A  CT (computed tomography) scan is a series of pictures that allows us to look inside your body. The scanner creates images of the body in cross sections, much like slices of bread. This helps us see any problems more clearly.  Who should I call with questions: If you have any questions, please call the Imaging Department where you will have your exam. Directions, parking instructions, and other information is available on our website, [email or web address].            Sep 13, 2018  2:45 PM CDT   CT CHEST W/O CONTRAST with RSCCCT1   [facility] ([facility])    [address]  [facility] [address] [phone]           How do I prepare for my exam? (Food and drink instructions) No Food and Drink Restrictions.  How do I prepare for my exam? (Other instructions) You do not need to do anything special to prepare for this exam. For a sinus scan: Use your nose spray (nasal decongestant spray) as directed.  What should I wear: Please wear loose clothing, such as a sweat suit or jogging clothes. Avoid snaps, zippers and other metal. We may ask you to undress and put on a hospital gown.  How long does the exam take: Most scans take less than 20 minutes.  What should I bring: Please bring any scans or X-rays taken at other hospitals, if similar tests were done. Also bring a list of your medicines, including vitamins, minerals and over-the-counter drugs. It is safest to leave personal items at home.  Do I need a : No  is needed.  What do I need to tell my doctor? Be sure to tell your doctor: * If you have any allergies. * If there s any chance you are pregnant. * If you are breastfeeding.  What should I do after the exam: No restrictions, You may resume normal activities.  What is this test: A CT (computed tomography) scan is a series of pictures that allows us to look inside your body. The scanner creates images of the body in cross sections, much  like slices of bread. This helps us see any problems more clearly.  Who should I call with questions: If you have any questions, please call the Imaging Department where you will have your exam. Directions, parking instructions, and other information is available on our website, [email or web address].            Oct 11, 2018 11:10 AM CDT   Cystoscopy with Kar Nuñez MD, UB CYF   [facility] ([facility])    [address]  [address]  [facility] [address] [phone]              Who to contact     If you have questions or need follow up information about today's clinic visit or your schedule please contact [facility] directly at [phone].  Normal or non-critical lab and imaging results will be communicated to you by MyChart, letter or phone within 4 business days after the clinic has received the results. If you do not hear from us within 7 days, please contact the clinic through [employer] or phone. If you have a critical or abnormal lab result, we will notify you by phone as soon as possible.  Submit refill requests through [employer] or call your pharmacy and they will forward the refill request to us. Please allow 3 business days for your refill to be completed.          Additional Information About Your Visit        [employer] Information     [employer] gives you secure access to your electronic health record. If you see a primary care provider, you can also send messages to your care team and make appointments. If you have questions, please call your primary care clinic.  If you do not have a primary care provider, please call [phone] and they will assist you.        Care EveryWhere ID     This is your Care EveryWhere ID. This could be used by other organizations to access your [place] medical records  [id number]        Your Vitals Were     Pulse Pulse Oximetry                71 92%           Blood Pressure from  Last 3 Encounters:   09/12/18 121/62   09/05/18 90/56   08/24/18 110/70    Weight from Last 3 Encounters:   09/05/18 47.6 kg (105 lb)   08/24/18 47.6 kg (105 lb)   07/02/18 46.3 kg (102 lb)              Today, you had the following     No orders found for display         Today's Medication Changes          These changes are accurate as of 9/12/18 10:32 AM.  If you have any questions, ask your nurse or doctor.               These medicines have changed or have updated prescriptions.        Dose/Directions    traZODone 50 MG tablet   Commonly known as:  DESYREL   This may have changed:  how much to take   Used for:  Insomnia, unspecified type        Dose:  50 mg   Take 1 tablet (50 mg) by mouth nightly as needed for sleep   Quantity:  30 tablet   Refills:  0                Primary Care Provider Office Phone # Fax #    Piper Kiser [phone]       [address] [facility] [address]        Equal Access to Services     [facility] AH: Hadii aad ku hadasho Soomaali, waaxda luqadaha, qaybta kaalmada adeegyada, waxay prateekin hayhelena hyatt . So [facility] [phone].    ATENCIÓN: Si habla español, tiene a eaton disposición servicios gratuitos de asistencia lingüística. LlRegency Hospital [facility] East [phone].    We comply with applicable federal civil rights laws and [place] laws. We do not discriminate on the basis of race, color, national origin, age, disability, sex, sexual orientation, or gender identity.            Thank you!     Thank you for choosing [facility]  for your care. Our goal is always to provide you with excellent care. Hearing back from our patients is one way we can continue to improve our services. Please take a few minutes to complete the written survey that you may receive in the mail after your visit with us. Thank you!             Your Updated Medication List - Protect others around you: Learn how to safely use, store and throw away your medicines at  www.disposemymeds.org.          This list is accurate as of 9/12/18 10:32 AM.  Always use your most recent med list.                   Brand Name Dispense Instructions for use Diagnosis    * albuterol (2.5 MG/3ML) 0.083% neb solution      Take 1 vial by nebulization 2 times daily        * albuterol 108 (90 Base) MCG/ACT inhaler    PROAIR HFA/PROVENTIL HFA/VENTOLIN HFA    1 Inhaler    Inhale 2 puffs into the lungs every 6 hours as needed for wheezing    Intermittent asthma without complication       atorvastatin 20 MG tablet    LIPITOR    90 tablet    Take 1 tablet (20 mg) by mouth daily    ACS (acute coronary syndrome) (H), Hyperlipidemia with target LDL less than 130       budesonide-formoterol 80-4.5 MCG/ACT Inhaler    SYMBICORT    3 Inhaler    Inhale 2 puffs into the lungs 2 times daily    COPD exacerbation (H)       denosumab 60 MG/ML Soln injection    PROLIA    1 mL    Inject 1 mL (60 mg) Subcutaneous every 6 months    Senile osteoporosis, CKD (chronic kidney disease) stage 3, GFR 30-59 ml/min       ferrous sulfate 325 (65 Fe) MG tablet    IRON     Take by mouth daily (with breakfast)        FIRST-HANS MOUTHWASH Susp     237 mL    Swish and swallow 5-10 mLs in mouth every 6 hours as needed    Thrush       fluconazole 150 MG tablet    DIFLUCAN    7 tablet    Take 1 tablet (150 mg) by mouth daily    Mouth sores       furosemide 20 MG tablet    LASIX    90 tablet    TAKE ONE TABLET BY MOUTH ONE TIME DAILY    Localized edema       gabapentin 300 MG capsule    NEURONTIN    90 capsule    Take 1 capsule (300 mg) by mouth daily as needed    Hip pain, left, Lumbar radicular pain       levothyroxine 75 MCG tablet    SYNTHROID/LEVOTHROID    90 tablet    Take 1 tablet (75 mcg) by mouth daily    Other specified hypothyroidism       metoprolol succinate 25 MG 24 hr tablet    TOPROL-XL    90 tablet    Take 1 tablet (25 mg) by mouth daily    ACS (acute coronary syndrome) (H)       MULTIVITAMIN GUMMIES ADULT PO           order  for DME     1 each    Equipment being ordered: Nebulizer Fax order to [facility] [phone]    COPD exacerbation (H)       traZODone 50 MG tablet    DESYREL    30 tablet    Take 1 tablet (50 mg) by mouth nightly as needed for sleep    Insomnia, unspecified type       * Notice:  This list has 2 medication(s) that are the same as other medications prescribed for you. Read the directions carefully, and ask your doctor or other care provider to review them with you.

## 2018-09-12 NOTE — PATIENT INSTRUCTIONS
We have elected not to do the interventional procedure today after listening to the risks of the epidural with a platelet count of 90.     I am recommending you follow up with Ariadna Cassidy CNP in clinic.     Jeaneth Gonsalez MD

## 2018-09-12 NOTE — Clinical Note
We are postponing her procedure.  Platelet count is 90 today and there is a risk of hematoma.  I gave her the option of doing the procedure and she opted to postpone at this time given she is currently having a lot of other medical issues that are being worked up at this time.  Jeaneth

## 2018-09-13 ENCOUNTER — HOSPITAL ENCOUNTER (OUTPATIENT)
Dept: CT IMAGING | Facility: CLINIC | Age: 83
Discharge: HOME | End: 2018-09-13
Attending: INTERNAL MEDICINE | Admitting: INTERNAL MEDICINE
Payer: MEDICARE

## 2018-09-13 ENCOUNTER — HOSPITAL ENCOUNTER (OUTPATIENT)
Dept: CT IMAGING | Facility: CLINIC | Age: 83
End: 2018-09-13
Attending: INTERNAL MEDICINE
Payer: MEDICARE

## 2018-09-13 DIAGNOSIS — C67.9 BLADDER CANCER (H): ICD-10-CM

## 2018-09-13 DIAGNOSIS — C91.10 CLL (CHRONIC LYMPHOCYTIC LEUKEMIA) (H): ICD-10-CM

## 2018-09-13 PROCEDURE — 71250 CT THORAX DX C-: CPT

## 2018-09-13 PROCEDURE — 70490 CT SOFT TISSUE NECK W/O DYE: CPT

## 2018-09-17 ENCOUNTER — APPOINTMENT (OUTPATIENT)
Dept: GENERAL RADIOLOGY | Facility: CLINIC | Age: 83
End: 2018-09-17
Attending: EMERGENCY MEDICINE
Payer: MEDICARE

## 2018-09-17 ENCOUNTER — APPOINTMENT (OUTPATIENT)
Dept: CT IMAGING | Facility: CLINIC | Age: 83
End: 2018-09-17
Attending: EMERGENCY MEDICINE
Payer: MEDICARE

## 2018-09-17 ENCOUNTER — HOSPITAL ENCOUNTER (EMERGENCY)
Facility: CLINIC | Age: 83
Discharge: HOME | End: 2018-09-17
Attending: EMERGENCY MEDICINE | Admitting: EMERGENCY MEDICINE
Payer: MEDICARE

## 2018-09-17 ENCOUNTER — APPOINTMENT (OUTPATIENT)
Dept: MRI IMAGING | Facility: CLINIC | Age: 83
End: 2018-09-17
Attending: EMERGENCY MEDICINE
Payer: MEDICARE

## 2018-09-17 VITALS
HEART RATE: 77 BPM | DIASTOLIC BLOOD PRESSURE: 72 MMHG | OXYGEN SATURATION: 89 % | SYSTOLIC BLOOD PRESSURE: 123 MMHG | RESPIRATION RATE: 16 BRPM | TEMPERATURE: 99.2 F

## 2018-09-17 DIAGNOSIS — R33.9 URINARY RETENTION: ICD-10-CM

## 2018-09-17 DIAGNOSIS — R16.1 SPLENOMEGALY: ICD-10-CM

## 2018-09-17 DIAGNOSIS — K59.00 CONSTIPATION, UNSPECIFIED CONSTIPATION TYPE: ICD-10-CM

## 2018-09-17 LAB
ALBUMIN UR-MCNC: NEGATIVE MG/DL
ANION GAP SERPL CALCULATED.3IONS-SCNC: 6 MMOL/L (ref 3–14)
ANISOCYTOSIS BLD QL SMEAR: SLIGHT
APPEARANCE UR: CLEAR
BACTERIA #/AREA URNS HPF: ABNORMAL /HPF
BASOPHILS # BLD AUTO: 0 10E9/L (ref 0–0.2)
BASOPHILS NFR BLD AUTO: 0 %
BILIRUB UR QL STRIP: NEGATIVE
BUN SERPL-MCNC: 16 MG/DL (ref 7–30)
CALCIUM SERPL-MCNC: 8.6 MG/DL (ref 8.5–10.1)
CHLORIDE SERPL-SCNC: 97 MMOL/L (ref 94–109)
CO2 SERPL-SCNC: 30 MMOL/L (ref 20–32)
COLOR UR AUTO: YELLOW
CREAT SERPL-MCNC: 1.09 MG/DL (ref 0.52–1.04)
DIFFERENTIAL METHOD BLD: ABNORMAL
ELLIPTOCYTES BLD QL SMEAR: SLIGHT
EOSINOPHIL # BLD AUTO: 0 10E9/L (ref 0–0.7)
EOSINOPHIL NFR BLD AUTO: 0 %
ERYTHROCYTE [DISTWIDTH] IN BLOOD BY AUTOMATED COUNT: 16 % (ref 10–15)
GFR SERPL CREATININE-BSD FRML MDRD: 48 ML/MIN/1.7M2
GLUCOSE SERPL-MCNC: 121 MG/DL (ref 70–99)
GLUCOSE UR STRIP-MCNC: NEGATIVE MG/DL
HCT VFR BLD AUTO: 42.5 % (ref 35–47)
HEMOCCULT STL QL: NEGATIVE
HGB BLD-MCNC: 13.2 G/DL (ref 11.7–15.7)
HGB UR QL STRIP: NEGATIVE
KETONES UR STRIP-MCNC: NEGATIVE MG/DL
LEUKOCYTE ESTERASE UR QL STRIP: NEGATIVE
LYMPHOCYTES # BLD AUTO: 95.4 10E9/L (ref 0.8–5.3)
LYMPHOCYTES NFR BLD AUTO: 93 %
MCH RBC QN AUTO: 28.8 PG (ref 26.5–33)
MCHC RBC AUTO-ENTMCNC: 31.1 G/DL (ref 31.5–36.5)
MCV RBC AUTO: 93 FL (ref 78–100)
MONOCYTES # BLD AUTO: 3.1 10E9/L (ref 0–1.3)
MONOCYTES NFR BLD AUTO: 3 %
NEUTROPHILS # BLD AUTO: 4.1 10E9/L (ref 1.6–8.3)
NEUTROPHILS NFR BLD AUTO: 4 %
NITRATE UR QL: NEGATIVE
PH UR STRIP: 7 PH (ref 5–7)
PLATELET # BLD AUTO: 99 10E9/L (ref 150–450)
PLATELET # BLD EST: ABNORMAL 10*3/UL
POTASSIUM SERPL-SCNC: 4.5 MMOL/L (ref 3.4–5.3)
RBC # BLD AUTO: 4.59 10E12/L (ref 3.8–5.2)
RBC #/AREA URNS AUTO: 1 /HPF (ref 0–2)
SODIUM SERPL-SCNC: 133 MMOL/L (ref 133–144)
SOURCE: ABNORMAL
SP GR UR STRIP: 1.01 (ref 1–1.03)
UROBILINOGEN UR STRIP-MCNC: 0 MG/DL (ref 0–2)
WBC # BLD AUTO: 102.6 10E9/L (ref 4–11)
WBC #/AREA URNS AUTO: <1 /HPF (ref 0–5)

## 2018-09-17 PROCEDURE — 25000128 H RX IP 250 OP 636: Performed by: EMERGENCY MEDICINE

## 2018-09-17 PROCEDURE — 74176 CT ABD & PELVIS W/O CONTRAST: CPT

## 2018-09-17 PROCEDURE — 25000132 ZZH RX MED GY IP 250 OP 250 PS 637: Mod: GY | Performed by: EMERGENCY MEDICINE

## 2018-09-17 PROCEDURE — 80048 BASIC METABOLIC PNL TOTAL CA: CPT | Performed by: EMERGENCY MEDICINE

## 2018-09-17 PROCEDURE — 72148 MRI LUMBAR SPINE W/O DYE: CPT

## 2018-09-17 PROCEDURE — 82272 OCCULT BLD FECES 1-3 TESTS: CPT | Performed by: EMERGENCY MEDICINE

## 2018-09-17 PROCEDURE — 71046 X-RAY EXAM CHEST 2 VIEWS: CPT

## 2018-09-17 PROCEDURE — 96361 HYDRATE IV INFUSION ADD-ON: CPT

## 2018-09-17 PROCEDURE — 51702 INSERT TEMP BLADDER CATH: CPT

## 2018-09-17 PROCEDURE — 85025 COMPLETE CBC W/AUTO DIFF WBC: CPT | Performed by: EMERGENCY MEDICINE

## 2018-09-17 PROCEDURE — 99285 EMERGENCY DEPT VISIT HI MDM: CPT | Mod: 25

## 2018-09-17 PROCEDURE — 81001 URINALYSIS AUTO W/SCOPE: CPT | Performed by: EMERGENCY MEDICINE

## 2018-09-17 PROCEDURE — 51798 US URINE CAPACITY MEASURE: CPT

## 2018-09-17 PROCEDURE — 96360 HYDRATION IV INFUSION INIT: CPT

## 2018-09-17 PROCEDURE — A9270 NON-COVERED ITEM OR SERVICE: HCPCS | Mod: GY | Performed by: EMERGENCY MEDICINE

## 2018-09-17 RX ORDER — SODIUM CHLORIDE 9 MG/ML
INJECTION, SOLUTION INTRAVENOUS ONCE
Status: COMPLETED | OUTPATIENT
Start: 2018-09-17 | End: 2018-09-17

## 2018-09-17 RX ORDER — POLYETHYLENE GLYCOL 3350 17 G/17G
1 POWDER, FOR SOLUTION ORAL DAILY
Qty: 527 G | Refills: 0 | Status: SHIPPED | OUTPATIENT
Start: 2018-09-17 | End: 2019-02-19

## 2018-09-17 RX ORDER — GABAPENTIN 300 MG/1
300 CAPSULE ORAL ONCE
Status: COMPLETED | OUTPATIENT
Start: 2018-09-17 | End: 2018-09-17

## 2018-09-17 RX ADMIN — GABAPENTIN 300 MG: 300 CAPSULE ORAL at 19:54

## 2018-09-17 RX ADMIN — SODIUM CHLORIDE: 9 INJECTION, SOLUTION INTRAVENOUS at 14:45

## 2018-09-17 ASSESSMENT — ENCOUNTER SYMPTOMS
MYALGIAS: 1
DYSURIA: 0
DIFFICULTY URINATING: 1
ABDOMINAL PAIN: 0
CONSTIPATION: 1
FEVER: 0
NUMBNESS: 1

## 2018-09-17 NOTE — ED PROVIDER NOTES
History     Chief Complaint:  Constipation and Urinary Retention    The history is provided by the patient.      Marie Kline is a 86 year old female with a history of bladder cancer who presents with a friend to the Emergency Department today for evaluation of constipation and urinary retention. The patient reports that she was unable to urinate or defecate. She reports needing to 'dig out' her poop and that it was hard. Her last bowel movement was 3 days ago and she reports she has been unable to urinate. No fevers or pain with urination. No prior issues with urination or constipation. No abdominal pain. She reports she has arthritis in her back and her left foot aches and then goes numb when she walks her dog. She was supposed to get an epidural for this last week but her platelets were too low at 90. She had tumor inside her bladder that adhered to lining and was removed.    [facility] visit 9/10/2018  Lab: WC 93, hemoglobin in the 11-12 range,   CT scan shows increase in size of splenomegaly    Allergies:  CT contrast dye: hives     Medications:    Albuterol neb  Albuterol inhaler  Lipitor  Symbicort  Iron  Diflucan  Lasix  Neurontin  Levothyroxine  Toprol-XL  Multivitamin  Desyrel     Past Medical History:    Bladder cancer  Cardiomyopathy  Chronic lymphocytic leukemia  Congestive heart failure  COPD  Hypothyroid  Mumps  Myocardial infarction  Pulmonary edema cardiac cause  Tricuspid valve disorder  Hyperlipidemia  Generalized anxiety disorder  Degenerative disc disease  Splenomegaly   Chronic kidney disease  Purpura senilis    Past Surgical History:    Bladder surgery  Coronary angiography  Cytoscopy, transurethral resection combined    Family History:    Cerebrovascular disease  Cancer    Social History:  Smoking status: Former smoker, quit date 2/3/1996  Alcohol use: No  Marital Status:   [5]     Review of Systems   Constitutional: Negative for fever.   Gastrointestinal: Positive for  constipation. Negative for abdominal pain.   Genitourinary: Positive for difficulty urinating. Negative for dysuria.   Musculoskeletal: Positive for myalgias.   Neurological: Positive for numbness.   All other systems reviewed and are negative.    Physical Exam     Patient Vitals for the past 24 hrs:   BP Temp Temp src Heart Rate Resp SpO2   09/17/18 1708 - - - - - 93 %   09/17/18 1700 108/64 - - - - 90 %   09/17/18 1657 - - - - - 91 %   09/17/18 1650 112/67 - - - - -   09/17/18 1323 120/87 99.2  F (37.3  C) Temporal 68 16 90 %       Physical Exam  Constitutional: Vital signs reviewed as above. There appears to be minimal distress at the time of my exam (s/p tatum placement)  HENT:    Head: No external signs of trauma noted.   Eyes: Conjunctivae are normal. Pupils are equal, round, and reactive to light.   Cardiovascular:    Normal rate, regular rhythm and normal heart sounds.     Exam reveals no friction rub.     No murmur heard.  Pulmonary/Chest:    Effort normal and breath sounds normal.    No respiratory distress.    There are no wheezes.    There are no rales.   Abdominal:    Soft.    Bowel sounds normal.    There is no distension.    There is no tenderness at the time of my exam.    There is no rebound or guarding.   Rectal (Chaperoned)   Decreased tone   No gross blood   Dark brown colored stool noted   There are external hemorrhoids noted   No anal fissures noted   There is soft stool palpated in rectal vault   Sensation appears normal.  Musculoskeletal:    Normal range of motion.    Normal Tone  Neurological: Patient is alert. GCS 15. Straight leg raise negative B/L. No sensory loss.   Skin: Skin is warm and dry. Patient is not diaphoretic  Psychiatric: The patient appears calm    Emergency Department Course     Imaging:  Radiographic findings were communicated with the patient who voiced understanding of the findings.  Abdomen/pelvis CT no contrast - stone protocol 1  IMPRESSION:  1. Large amount of rectal  stool, otherwise moderate overall stool.  2. Minimal improvement in splenomegaly since the comparison study. As read by radiology.  Lumbar spine MRI w/o contrast  IMPRESSION:    1. The conus and cauda equina appear normal. No intradural lesions are  seen.  2. Multilevel degenerative change. This is most severe at the L4-L5  level. When compared to 5/16/2018, there has been no significant  change. As read by radiology.    Laboratory:  CBC: .6 (HH), PLT 99 (L) o/w WNL, (HGB 13.2)  CMP: Glucose 121 (H), Creatinine 1.09 (H), GFR 48 (L) o/w WNL    Routine UA with Microscopic: Bacteria few    Stool: occult blood negative    Interventions:  1445: NS infusion 100 mL/hr IV    Emergency Department Course:  Past medical records, nursing notes, and vitals reviewed.  1419: I performed an exam of the patient and obtained history, as documented above.    IV inserted and blood drawn.    The patient was sent for a abdomen/pelvis CT and lumbar spine MRI while in the emergency department, findings above.    1533: I rechecked the patient. Explained findings to the patient.    1722: I rechecked the patient. Explained findings to the patient.    1816: I plan for the patient to be discharged home with instructions regarding supportive care, medications, and reasons to return. The importance of close follow-up was reviewed. Dr. Walker to follow urination results.    Impression & Plan      Medical Decision Making:  This 86 year old female patient presents to the ED due to constipation. Please see the HPI and exam for specifics. The patient notes that she has not been able to have a bowel movement for the last three days and has not been able to urinate for the last two days. Bladder scan revealed more than 900 mL of urine and catheter placement initially was notable for 1200 mL of urine drained out before the catheter was clamped. There was additional urine drained out afterwards. Based on the patient's history of CLL and bladder  cancer I did elect to CT the patient's abdomen and pelvis even though she did not seem distended. There was no mention of any masses or obstruction. Given the patient's prior history of lumbar problems and concern that her inability to urinate or have a bowel movement could be related to spinal cord issues I did MRI the patient. There seemed to be no change in her MRI from the previous one. After bladder decompression the patient was noted to have a couple bowel movements in the ED. At this time we have removed the catheter and I will attempt a voiding trial with the patient. Should she pass this I believe she can go home, though I will send her with stool softeners and encourage close outpatient follow up.     I believe that the etiology of the patient's symptoms could be some mechanical compression of the descending colon, perhaps by the splenomegaly, and the resulting constipation could have put pressure on the outflow tract from the bladder. This would then have led to urinary retention as noted today. Additionally, nursing noted a low oxygen saturation on the patient. She does have COPD and does have a home oxygen concentrator, though she notes she has not needed it recently. The patient's O2 increases with deeper respiration. There are coarse breath sounds noted, though the patient denies recent cough or fever. CXR did not reveal any infectious etiology. The patient states she is comfortable and in no respiratory distress. The patient was signed out to Dr. Walker. As noted above, I believe she can be discharged should be be able to void. If she is unable to void and her bladder is full, a catheter would need to be replaced, though she could likely still go home and follow up with her Urologist.      Impressions:    ICD-10-CM   1. Constipation, unspecified constipation type K59.00   2. Urinary retention R33.9   3. Splenomegaly R16.1       Anticipated disposition:  Discharge to home; Dr. Walker will follow patient's  ability to void.    Discharge Medications:  New Prescriptions    POLYETHYLENE GLYCOL (MIRALAX) POWDER    Take 17 g (1 capful) by mouth daily         Sussy Walker  9/17/2018   [facility] EMERGENCY DEPARTMENT  Scribe Disclosure:  I, Sussy Walker, am serving as a scribe at 2:19 PM on 9/17/2018 to document services personally performed by Shalom Victoria DO based on my observations and the provider's statements to me.        Shalom Victoria DO  09/17/18 [id number]

## 2018-09-17 NOTE — ED TRIAGE NOTES
"Patient comes in referred from [facility] for evaluation of abdominal pain with urinary retention and constipation. Patient states she has not urinated in 2 days and had to dig out her stool. Also notes that orders were placed for her to \"have a lymp node removed from her stomach.\" ABCs intact.  "

## 2018-09-17 NOTE — ED AVS SNAPSHOT
[facility] Emergency Department    [address]    [facility] [address]    Phone:  [phone]    Fax:  [phone]                                       Marie Kline   MRN: [id number]    Department:  [facility] Emergency Department   Date of Visit:  9/17/2018           Patient Information     Date Of Birth          4/28/1932        Your diagnoses for this visit were:     Constipation, unspecified constipation type     Urinary retention     Splenomegaly        You were seen by Shalom Victoria DO and Reji Walker MD.      Follow-up Information     Follow up with Piper Kiser MD. Call in 2 days.    Specialty:  Internal Medicine    Why:  As needed    Contact information:    [address] [facility] [address]  [phone]          Follow up with [facility] Emergency Department.    Specialty:  EMERGENCY MEDICINE    Why:  If symptoms worsen    Contact information:    [address] [facility] [id number] [phone]        Discharge Instructions           Rees Catheter Care    A Rees catheter is a rubber tube that is placed through the urethra (opening where urine comes out) and into the bladder. This helps drain urine from the bladder. There is a small balloon on the end of the tube that is inflated after insertion. This keeps the catheter from sliding out of the bladder.  A Rees catheter is used to treat urinary retention (unable to pass urine). It is also used when there is incontinence (loss of bladder control).  Home care    Finish taking any prescribed antibiotic even if you are feeling better before then.    It is important to keep bacteria from getting into the collection bag. Do not disconnect the catheter from the collection bag.    Use a leg band to secure the drainage tube, so it does not pull on the catheter. Drain the collection bag when it becomes full using the drain spout at the bottom of the bag.    Do  not try to pull or remove your catheter. This will injure your urethra. It must be removed by your healthcare provider or nurse.  Follow-up care  Follow up with your healthcare provider as advised for repeat urine testing and catheter removal or replacement.  When to seek medical advice  Call your healthcare provider right away if any of these occur:    Fever of 100.4 F (38 C) or higher, or as directed by your healthcare provider    Bladder pain or fullness    Abdominal swelling, nausea or vomiting, or back pain    Blood or urine leakage around the catheter    Bloody urine coming from the catheter (if a new symptom)    Catheter falls out    Catheter stops draining for 6 hours    Weakness, dizziness, or fainting  Date Last Reviewed: 10/1/2016    7064-8684 [employer] [address] [place] [address], [place] [address]. All rights reserved. This information is not intended as a substitute for professional medical care. Always follow your healthcare professional's instructions.          Constipation (Adult)  Constipation means that you have bowel movements that are less frequent than usual. Stools often become very hard and difficult to pass.  Constipation is very common. At some point in life it affects almost everyone. Since everyone's bowel habits are different, what is constipation to one person may not be to another. Your healthcare provider may do tests to diagnose constipation. It depends on what he or she finds when evaluating you.    Symptoms of constipation include:    Abdominal pain    Bloating    Vomiting    Painful bowel movements    Itching, swelling, bleeding, or pain around the anus  Causes  Constipation can have many causes. These include:    Diet low in fiber    Too much dairy    Not drinking enough liquids    Lack of exercise or physical activity. This is especially true for older adults.    Changes in lifestyle or daily routine, including pregnancy, aging, work, and travel    Frequent use or  misuse of laxatives    Ignoring the urge to have a bowel movement or delaying it until later    Medicines, such as certain prescription pain medicines, iron supplements, antacids, certain antidepressants, and calcium supplements    Diseases like irritable bowel syndrome, bowel obstructions, stroke, diabetes, thyroid disease, Parkinson disease, hemorrhoids, and colon cancer  Complications  Potential complications of constipation can include:    Hemorrhoids    Rectal bleeding from hemorrhoids or anal fissures (skin tears)    Hernias    Dependency on laxatives    Chronic constipation    Fecal impaction    Bowel obstruction or perforation  Home care  All treatment should be done after talking with your healthcare provider. This is especially true if you have another medical problems, are taking prescription medicines, or are an older adult. Treatment most often involves lifestyle changes. You may also need medicines. Your healthcare provider will tell you which will work best for you. Follow the advice below to help avoid this problem in the future.  Lifestyle changes  These lifestyle changes can help prevent constipation:    Diet. Eat a high-fiber diet, with fresh fruit and vegetables, and reduce dairy intake, meats, and processed foods    Fluids. It's important to get enough fluids each day. Drink plenty of water when you eat more fiber. If you are on diet that limits the amount of fluid you can have, talk about this with your healthcare provider.    Regular exercise. Check with your healthcare provider first.  Medicines  Take any medicines as directed. Some laxatives are safe to use only every now and then. Others can be taken on a regular basis. Talk with your doctor or pharmacist if you have questions.  Prescription pain medicines can cause constipation. If you are taking this kind of medicine, ask your healthcare provider if you should also take a stool softener.  Medicines you may take to treat constipation  include:    Fiber supplements    Stool softeners    Laxatives    Enemas    Rectal suppositories  Follow-up care  Follow up with your healthcare provider if symptoms don't get better in the next few days. You may need to have more tests or see a specialist.  Call 911  Call 911 if any of these occur:    Trouble breathing    Stiff, rigid abdomen that is severely painful to touch    Confusion    Fainting or loss of consciousness    Rapid heart rate    Chest pain  When to seek medical advice  Call your healthcare provider right away if any of these occur:    Fever of 100.4 F (38 C) or higher, or as directed by your healthcare provider    Failure to resume normal bowel movements    Pain in your abdomen or back gets worse    Nausea or vomiting    Swelling in your abdomen    Blood in the stool    Black, tarry stool    Involuntary weight loss    Weakness  Date Last Reviewed: 12/30/2015 2000-2017 [employer]. [address] [place] [address]. All rights reserved. This information is not intended as a substitute for professional medical care. Always follow your healthcare professional's instructions.          Urinary Retention (Female)    Urinary retention is the medical term for difficulty or inability to pass urine, even though your bladder is full.  Causes  For girls and women, the most common cause of urinary retention is a bladder infection. Certain medicines and changes in the body, such as uterine prolapse, can also cause this problem.  Symptoms  Some people have no symptoms. For others, common symptoms include:    Bladder or lower-abdominal pain or fullness    Abdominal swelling    Nausea or vomiting    Back pain    Frequent urination    Feeling that the bladder is still full after urinating    Incontinence (not being able to control the release of urine)  Treatment  This condition is treated by inserting a tube (catheter) into the bladder to drain the urine. This provides immediate relief.  The catheter may need to stay in place for a few days. The catheter has a balloon on the tip, which is inflated after insertion. This prevents the catheter from falling out.  Home care    If you were given antibiotics to treat a bladder infection, take them until they are used up, or your healthcare provider tells you to stop. It is important to finish the antibiotics even though you feel better. This is to make sure your infection has cleared.    If a catheter was left in place, it is important to keep bacteria from getting into the collection bag. Don't disconnect the catheter from the collection bag.    Use a leg band to secure the drainage tube, so it does not pull on the catheter. Drain the collection bag when it becomes full using the drain spout at the bottom of the bag.    Don't pull on or try to remove your catheter. This will injure your urethra. The catheter must be removed by a healthcare provider.  Follow-up care  Follow up with your healthcare provider, or as advised.  If a catheter was left in place, it can usually be removed within 3 to 7 days. Some conditions require that the catheter stays in longer. Your healthcare provider will tell you when to return to have the catheter removed.  When to seek medical advice  Call your healthcare provider right away if any of these occur:    Fever of 100.4 F (38 C) or higher, or as directed by your healthcare provider    Bladder or lower-abdominal pain or fullness    Abdominal swelling, nausea, vomiting, or back pain    Blood or urine leakage around the catheter    Bloody urine coming from the catheter (if a new symptom)    Weakness, dizziness, or fainting    Confusion or change in usual level of alertness    If a catheter was left in place, return if:  ? Catheter falls out  ? Catheter stops draining for 6 hours  Date Last Reviewed: 10/1/2017    4408-4879 [employer]. [address] [place] [address], [place] [address]. All rights reserved. This information  is not intended as a substitute for professional medical care. Always follow your healthcare professional's instructions.          Your next 10 appointments already scheduled     Oct 11, 2018 11:10 AM CDT   Cystoscopy with Kar Nuñez MD, UB CYF   [facility] ([facility])    [address] [facility]  [address]  [facility] [id number] [phone]              24 Hour Appointment Hotline       To make an appointment at any [facility], call [phone] ([phone]). If you don't have a family doctor or clinic, we will help you find one. [facility] are conveniently located to serve the needs of you and your family.             Review of your medicines      START taking        Dose / Directions Last dose taken    polyethylene glycol powder   Commonly known as:  MIRALAX   Dose:  1 capful   Quantity:  527 g        Take 17 g (1 capful) by mouth daily   Refills:  0          Our records show that you are taking the medicines listed below. If these are incorrect, please call your family doctor or clinic.        Dose / Directions Last dose taken    * albuterol (2.5 MG/3ML) 0.083% neb solution   Dose:  1 vial        Take 1 vial by nebulization 2 times daily   Refills:  0        * albuterol 108 (90 Base) MCG/ACT inhaler   Commonly known as:  PROAIR HFA/PROVENTIL HFA/VENTOLIN HFA   Dose:  2 puff   Quantity:  1 Inhaler        Inhale 2 puffs into the lungs every 6 hours as needed for wheezing   Refills:  8        atorvastatin 20 MG tablet   Commonly known as:  LIPITOR   Dose:  20 mg   Quantity:  90 tablet        Take 1 tablet (20 mg) by mouth daily   Refills:  1        budesonide-formoterol 80-4.5 MCG/ACT Inhaler   Commonly known as:  SYMBICORT   Dose:  2 puff   Quantity:  3 Inhaler        Inhale 2 puffs into the lungs 2 times daily   Refills:  1        denosumab 60 MG/ML Soln injection   Commonly known as:  PROLIA   Dose:  60 mg   Quantity:  1  mL        Inject 1 mL (60 mg) Subcutaneous every 6 months   Refills:  0        ferrous sulfate 325 (65 Fe) MG tablet   Commonly known as:  IRON        Take by mouth daily (with breakfast)   Refills:  0        FIRST-HANS MOUTHWASH Susp   Dose:  5-10 mL   Quantity:  237 mL        Swish and swallow 5-10 mLs in mouth every 6 hours as needed   Refills:  1        fluconazole 150 MG tablet   Commonly known as:  DIFLUCAN   Dose:  150 mg   Quantity:  7 tablet        Take 1 tablet (150 mg) by mouth daily   Refills:  2        furosemide 20 MG tablet   Commonly known as:  LASIX   Quantity:  90 tablet        TAKE ONE TABLET BY MOUTH ONE TIME DAILY   Refills:  1        gabapentin 300 MG capsule   Commonly known as:  NEURONTIN   Dose:  300 mg   Quantity:  90 capsule        Take 1 capsule (300 mg) by mouth daily as needed   Refills:  3        levothyroxine 75 MCG tablet   Commonly known as:  SYNTHROID/LEVOTHROID   Dose:  75 mcg   Quantity:  90 tablet        Take 1 tablet (75 mcg) by mouth daily   Refills:  2        metoprolol succinate 25 MG 24 hr tablet   Commonly known as:  TOPROL-XL   Dose:  25 mg   Quantity:  90 tablet        Take 1 tablet (25 mg) by mouth daily   Refills:  3        MULTIVITAMIN GUMMIES ADULT PO        Refills:  0        order for DME   Quantity:  1 each        Equipment being ordered: Nebulizer Fax order to [facility] [phone]   Refills:  0        traZODone 50 MG tablet   Commonly known as:  DESYREL   Dose:  50 mg   Quantity:  30 tablet        Take 1 tablet (50 mg) by mouth nightly as needed for sleep   Refills:  0        * Notice:  This list has 2 medication(s) that are the same as other medications prescribed for you. Read the directions carefully, and ask your doctor or other care provider to review them with you.            Prescriptions were sent or printed at these locations (1 Prescription)                   Other Prescriptions                Printed at Department/Unit printer (1 of 1)          polyethylene glycol (MIRALAX) powder                Procedures and tests performed during your visit     Abd/pelvis CT no contrast - Stone Protocol    Basic metabolic panel    Bladder scan    CBC with platelets differential    Chest XR,  PA & LAT    Rees catheter    Lumbar spine MRI w/o contrast    Routine UA with microscopic    Stool: occult blood      Orders Needing Specimen Collection     None      Pending Results     No orders found from 9/15/2018 to 9/18/2018.            Pending Culture Results     No orders found from 9/15/2018 to 9/18/2018.            Pending Results Instructions     If you had any lab results that were not finalized at the time of your Discharge, you can call the ED Lab Result RN at [phone]. You will be contacted by this team for any positive Lab results or changes in treatment. The nurses are available 7 days a week from 10A to 6:30P.  You can leave a message 24 hours per day and they will return your call.        Test Results From Your Hospital Stay        9/17/2018  2:24 PM      Component Results     Component Value Ref Range & Units Status    Color Urine Yellow  Final    Appearance Urine Clear  Final    Glucose Urine Negative NEG^Negative mg/dL Final    Bilirubin Urine Negative NEG^Negative Final    Ketones Urine Negative NEG^Negative mg/dL Final    Specific Gravity Urine 1.011 1.003 - 1.035 Final    Blood Urine Negative NEG^Negative Final    pH Urine 7.0 5.0 - 7.0 pH Final    Protein Albumin Urine Negative NEG^Negative mg/dL Final    Urobilinogen mg/dL 0.0 0.0 - 2.0 mg/dL Final    Nitrite Urine Negative NEG^Negative Final    Leukocyte Esterase Urine Negative NEG^Negative Final    Source Catheterized Urine  Final    WBC Urine <1 0 - 5 /HPF Final    RBC Urine 1 0 - 2 /HPF Final    Bacteria Urine Few (A) NEG^Negative /HPF Final         9/17/2018  3:48 PM      Component Results     Component Value Ref Range & Units Status    .6 (HH) 4.0 - 11.0 10e9/L Final    This  result has been called to JHONY STEPHENS (ERC) by Adriana Ceron on 09 17 2018   at 1431, and has been read back.       RBC Count 4.59 3.8 - 5.2 10e12/L Final    Hemoglobin 13.2 11.7 - 15.7 g/dL Final    Hematocrit 42.5 35.0 - 47.0 % Final    MCV 93 78 - 100 fl Final    MCH 28.8 26.5 - 33.0 pg Final    MCHC 31.1 (L) 31.5 - 36.5 g/dL Final    RDW 16.0 (H) 10.0 - 15.0 % Final    Platelet Count 99 (L) 150 - 450 10e9/L Final    Diff Method Manual Differential  Final    % Neutrophils 4.0 % Final    % Lymphocytes 93.0 % Final    Differential done on albumin treated blood due to smudge cells.    % Monocytes 3.0 % Final    % Eosinophils 0.0 % Final    % Basophils 0.0 % Final    Absolute Neutrophil 4.1 1.6 - 8.3 10e9/L Final    Absolute Lymphocytes 95.4 (H) 0.8 - 5.3 10e9/L Final    Absolute Monocytes 3.1 (H) 0.0 - 1.3 10e9/L Final    Absolute Eosinophils 0.0 0.0 - 0.7 10e9/L Final    Absolute Basophils 0.0 0.0 - 0.2 10e9/L Final    Anisocytosis Slight  Final    Elliptocytes Slight  Final    Platelet Estimate   Final    Automated count confirmed.  Platelet morphology is normal.         9/17/2018  2:34 PM      Component Results     Component Value Ref Range & Units Status    Sodium 133 133 - 144 mmol/L Final    Potassium 4.5 3.4 - 5.3 mmol/L Final    Chloride 97 94 - 109 mmol/L Final    Carbon Dioxide 30 20 - 32 mmol/L Final    Anion Gap 6 3 - 14 mmol/L Final    Glucose 121 (H) 70 - 99 mg/dL Final    Urea Nitrogen 16 7 - 30 mg/dL Final    Creatinine 1.09 (H) 0.52 - 1.04 mg/dL Final    GFR Estimate 48 (L) >60 mL/min/1.7m2 Final    Non  GFR Calc    GFR Estimate If Black 58 (L) >60 mL/min/1.7m2 Final    African American GFR Calc    Calcium 8.6 8.5 - 10.1 mg/dL Final         9/17/2018  3:38 PM      Narrative     CT ABDOMEN AND PELVIS WITHOUT CONTRAST 9/17/2018 3:01 PM     HISTORY: Urinary retention, constipation; evaluate for obstruction,  mass.     COMPARISON: September 5, 2018.    TECHNIQUE: Volumetric helical  acquisition of CT images from the lung  bases through the symphysis pubis without intravenous contrast.  Radiation dose for this scan was reduced using automated exposure  control, adjustment of the mA and/or kV according to patient size, or  iterative reconstruction technique.    FINDINGS: Large amount of rectal stool. Moderate stool overall.  Splenomegaly noted at 18.6 cm minimally improved from previous. The  liver, spleen, adrenal glands, kidneys, and pancreas demonstrate no  worrisome focal lesion in the absence of intravenous contrast.  Vascular calcifications and/or small stones seen in both kidneys. No  hydronephrosis or definite ureteral stones. There are no dilated loops  of small intestine or large bowel to suggest ileus or obstruction. No  free air or free fluid. Gallbladder unremarkable. Appendix not well  seen. No frankly destructive bony lesions. Lung bases clear of acute  infiltrates.        Impression     IMPRESSION:  1. Large amount of rectal stool, otherwise moderate overall stool.  2. Minimal improvement in splenomegaly since the comparison study.    TANISHA PRIETO MD         9/17/2018  3:15 PM      Component Results     Component Value Ref Range & Units Status    Occult Blood Negative NEG^Negative Final         9/17/2018  6:31 PM      Narrative     MRI LUMBAR SPINE WITHOUT CONTRAST   9/17/2018 4:44 PM     HISTORY: Urinary retention, fecal retention, back pain with sciatica.     TECHNIQUE: Multiplanar multisequence MRI of the lumbar spine without  contrast.    COMPARISON: Scan performed on 5/16/2018    FINDINGS: The report is dictated assuming five lumbar-type vertebral  bodies, and radiographic correlation may be necessary. Scoliosis of  the lumbar spine convex towards the left. The distal spinal cord and  cauda equina appear normal. The bone marrow appears normal.  The  paraspinal soft tissues appear normal.    T12-L1:   Normal disc, facet joints, spinal canal and neural foramina.        L1-L2:    Normal disc, facet joints, spinal canal and neural foramina.       L2-L3:  Degeneration of the disc. Loss of disc space height. Mild  annular disc bulge. Central canal is normal. No significant foraminal  stenosis.    L3-L4:  Degeneration of the disc. Loss of disc space height. Small  central disc protrusion. This is unchanged. The central canal and  neural foramina are normal. Mild degenerative change in the right  facet joint.    L4-L5:  Degeneration of the disc. Loss of disc space height. Severe  degenerative change in the facet joints. This combined with the  bulging disc is leading to moderate-severe central stenosis. There is  moderate-severe bilateral subarticular, lateral recess stenosis and  severe bilateral foraminal stenosis. These findings are unchanged.    L5-S1:  Degeneration of the disc. Mild annular disc bulge. Central  canal normal. Moderate-severe left foraminal stenosis. No significant  change.        Impression     IMPRESSION:    1. The conus and cauda equina appear normal. No intradural lesions are  seen.  2. Multilevel degenerative change. This is most severe at the L4-L5  level. When compared to 5/16/2018, there has been no significant  change.    PATRIZIA DORSEY MD         9/17/2018  7:48 PM      Narrative     XR CHEST 2 VW   9/17/2018 6:48 PM     HISTORY: hypoxia;     COMPARISON: None.    FINDINGS: The heart is negative.  The lungs are clear. The pulmonary  vasculature is normal.  Scoliosis convex towards the right is again  noted..        Impression     IMPRESSION: No active infiltrates. No significant change.        PATRIZIA DORSEY MD                Clinical Quality Measure: Blood Pressure Screening     Your blood pressure was checked while you were in the emergency department today. The last reading we obtained was  BP: 123/72 . Please read the guidelines below about what these numbers mean and what you should do about them.  If your systolic blood pressure (the top number) is less than 120 and  your diastolic blood pressure (the bottom number) is less than 80, then your blood pressure is normal. There is nothing more that you need to do about it.  If your systolic blood pressure (the top number) is 120-139 or your diastolic blood pressure (the bottom number) is 80-89, your blood pressure may be higher than it should be. You should have your blood pressure rechecked within a year by a primary care provider.  If your systolic blood pressure (the top number) is 140 or greater or your diastolic blood pressure (the bottom number) is 90 or greater, you may have high blood pressure. High blood pressure is treatable, but if left untreated over time it can put you at risk for heart attack, stroke, or kidney failure. You should have your blood pressure rechecked by a primary care provider within the next 4 weeks.  If your provider in the emergency department today gave you specific instructions to follow-up with your doctor or provider even sooner than that, you should follow that instruction and not wait for up to 4 weeks for your follow-up visit.        Thank you for choosing [facility]       Thank you for choosing [facility] for your care. Our goal is always to provide you with excellent care. Hearing back from our patients is one way we can continue to improve our services. Please take a few minutes to complete the written survey that you may receive in the mail after you visit with us. Thank you!        [employer] Information     [employer] gives you secure access to your electronic health record. If you see a primary care provider, you can also send messages to your care team and make appointments. If you have questions, please call your primary care clinic.  If you do not have a primary care provider, please call [phone] and they will assist you.        Care EveryWhere ID     This is your Care EveryWhere ID. This could be used by other organizations to access your [facility] medical records  [id number]         Equal Access to Services     RONNA ROBBINS : Claudia Cast, ladonna clayton, zoe dela cruz. So [facility] [phone].    ATENCIÓN: Si habla español, tiene a eaton disposición servicios gratuitos de asistencia lingüística. Llame al [phone].    We comply with applicable federal civil rights laws and [place] laws. We do not discriminate on the basis of race, color, national origin, age, disability, sex, sexual orientation, or gender identity.            After Visit Summary       This is your record. Keep this with you and show to your community pharmacist(s) and doctor(s) at your next visit.

## 2018-09-17 NOTE — ED AVS SNAPSHOT
[facility] Emergency Department    [address]    [facility] [address]    Phone:  [phone]    Fax:  [phone]                                       Marie Kline   MRN: [id number]    Department:  [facility] Emergency Department   Date of Visit:  9/17/2018           After Visit Summary Signature Page     I have received my discharge instructions, and my questions have been answered. I have discussed any challenges I see with this plan with the nurse or doctor.    ..........................................................................................................................................  Patient/Patient Representative Signature      ..........................................................................................................................................  Patient Representative Print Name and Relationship to Patient    ..................................................               ................................................  Date                                   Time    ..........................................................................................................................................  Reviewed by Signature/Title    ...................................................              ..............................................  Date                                               Time          22EPIC Rev 08/18

## 2018-09-18 ENCOUNTER — TELEPHONE (OUTPATIENT)
Dept: INTERNAL MEDICINE | Facility: CLINIC | Age: 83
End: 2018-09-18

## 2018-09-18 DIAGNOSIS — R33.9 URINARY RETENTION: Primary | ICD-10-CM

## 2018-09-18 NOTE — ED NOTES
Pt educated on how to use catheter leg bag at home and not to sleep with leg bag on. MD aware that pt is satting 87-88% on RA. PT is currently on 1L O2 in the ED and states that she has an O2 concentrator at home that she is able to use.

## 2018-09-18 NOTE — TELEPHONE ENCOUNTER
Contacted Korina. Patient was discharged from hospital with a catheter, but not given referral for home care. Patient's Assisted Living home requested home care through [facility] for catheter management.     [facility] requesting orders for Skilled nursing visits to evaluate and treat for catheter management.     Routed to provider to review since referral came from care facility.

## 2018-09-18 NOTE — ED NOTES
Pt left without obtaining last set of vital signs. Pt's VSS stable throughout ED stay and pt aware to use her O2 at home tonight. Pt's son in law returned to  paperwork.

## 2018-09-18 NOTE — TELEPHONE ENCOUNTER
Korina with [facility] calling needing verbal orders for skilled nursing to eval and treat- for new catheter management.      PH: [phone]

## 2018-09-18 NOTE — DISCHARGE INSTRUCTIONS
Rees Catheter Care    A Rees catheter is a rubber tube that is placed through the urethra (opening where urine comes out) and into the bladder. This helps drain urine from the bladder. There is a small balloon on the end of the tube that is inflated after insertion. This keeps the catheter from sliding out of the bladder.  A Rees catheter is used to treat urinary retention (unable to pass urine). It is also used when there is incontinence (loss of bladder control).  Home care    Finish taking any prescribed antibiotic even if you are feeling better before then.    It is important to keep bacteria from getting into the collection bag. Do not disconnect the catheter from the collection bag.    Use a leg band to secure the drainage tube, so it does not pull on the catheter. Drain the collection bag when it becomes full using the drain spout at the bottom of the bag.    Do not try to pull or remove your catheter. This will injure your urethra. It must be removed by your healthcare provider or nurse.  Follow-up care  Follow up with your healthcare provider as advised for repeat urine testing and catheter removal or replacement.  When to seek medical advice  Call your healthcare provider right away if any of these occur:    Fever of 100.4 F (38 C) or higher, or as directed by your healthcare provider    Bladder pain or fullness    Abdominal swelling, nausea or vomiting, or back pain    Blood or urine leakage around the catheter    Bloody urine coming from the catheter (if a new symptom)    Catheter falls out    Catheter stops draining for 6 hours    Weakness, dizziness, or fainting  Date Last Reviewed: 10/1/2016    1847-7371 [employer]. [address] [place] [address], [place] [address]. All rights reserved. This information is not intended as a substitute for professional medical care. Always follow your healthcare professional's instructions.          Constipation (Adult)  Constipation means that you have  bowel movements that are less frequent than usual. Stools often become very hard and difficult to pass.  Constipation is very common. At some point in life it affects almost everyone. Since everyone's bowel habits are different, what is constipation to one person may not be to another. Your healthcare provider may do tests to diagnose constipation. It depends on what he or she finds when evaluating you.    Symptoms of constipation include:    Abdominal pain    Bloating    Vomiting    Painful bowel movements    Itching, swelling, bleeding, or pain around the anus  Causes  Constipation can have many causes. These include:    Diet low in fiber    Too much dairy    Not drinking enough liquids    Lack of exercise or physical activity. This is especially true for older adults.    Changes in lifestyle or daily routine, including pregnancy, aging, work, and travel    Frequent use or misuse of laxatives    Ignoring the urge to have a bowel movement or delaying it until later    Medicines, such as certain prescription pain medicines, iron supplements, antacids, certain antidepressants, and calcium supplements    Diseases like irritable bowel syndrome, bowel obstructions, stroke, diabetes, thyroid disease, Parkinson disease, hemorrhoids, and colon cancer  Complications  Potential complications of constipation can include:    Hemorrhoids    Rectal bleeding from hemorrhoids or anal fissures (skin tears)    Hernias    Dependency on laxatives    Chronic constipation    Fecal impaction    Bowel obstruction or perforation  Home care  All treatment should be done after talking with your healthcare provider. This is especially true if you have another medical problems, are taking prescription medicines, or are an older adult. Treatment most often involves lifestyle changes. You may also need medicines. Your healthcare provider will tell you which will work best for you. Follow the advice below to help avoid this problem in the  future.  Lifestyle changes  These lifestyle changes can help prevent constipation:    Diet. Eat a high-fiber diet, with fresh fruit and vegetables, and reduce dairy intake, meats, and processed foods    Fluids. It's important to get enough fluids each day. Drink plenty of water when you eat more fiber. If you are on diet that limits the amount of fluid you can have, talk about this with your healthcare provider.    Regular exercise. Check with your healthcare provider first.  Medicines  Take any medicines as directed. Some laxatives are safe to use only every now and then. Others can be taken on a regular basis. Talk with your doctor or pharmacist if you have questions.  Prescription pain medicines can cause constipation. If you are taking this kind of medicine, ask your healthcare provider if you should also take a stool softener.  Medicines you may take to treat constipation include:    Fiber supplements    Stool softeners    Laxatives    Enemas    Rectal suppositories  Follow-up care  Follow up with your healthcare provider if symptoms don't get better in the next few days. You may need to have more tests or see a specialist.  Call 911  Call 911 if any of these occur:    Trouble breathing    Stiff, rigid abdomen that is severely painful to touch    Confusion    Fainting or loss of consciousness    Rapid heart rate    Chest pain  When to seek medical advice  Call your healthcare provider right away if any of these occur:    Fever of 100.4 F (38 C) or higher, or as directed by your healthcare provider    Failure to resume normal bowel movements    Pain in your abdomen or back gets worse    Nausea or vomiting    Swelling in your abdomen    Blood in the stool    Black, tarry stool    Involuntary weight loss    Weakness  Date Last Reviewed: 12/30/2015 2000-2017 [employer]. [address] [place] [address], [place] [address]. All rights reserved. This information is not intended as a substitute for professional  medical care. Always follow your healthcare professional's instructions.          Urinary Retention (Female)    Urinary retention is the medical term for difficulty or inability to pass urine, even though your bladder is full.  Causes  For girls and women, the most common cause of urinary retention is a bladder infection. Certain medicines and changes in the body, such as uterine prolapse, can also cause this problem.  Symptoms  Some people have no symptoms. For others, common symptoms include:    Bladder or lower-abdominal pain or fullness    Abdominal swelling    Nausea or vomiting    Back pain    Frequent urination    Feeling that the bladder is still full after urinating    Incontinence (not being able to control the release of urine)  Treatment  This condition is treated by inserting a tube (catheter) into the bladder to drain the urine. This provides immediate relief. The catheter may need to stay in place for a few days. The catheter has a balloon on the tip, which is inflated after insertion. This prevents the catheter from falling out.  Home care    If you were given antibiotics to treat a bladder infection, take them until they are used up, or your healthcare provider tells you to stop. It is important to finish the antibiotics even though you feel better. This is to make sure your infection has cleared.    If a catheter was left in place, it is important to keep bacteria from getting into the collection bag. Don't disconnect the catheter from the collection bag.    Use a leg band to secure the drainage tube, so it does not pull on the catheter. Drain the collection bag when it becomes full using the drain spout at the bottom of the bag.    Don't pull on or try to remove your catheter. This will injure your urethra. The catheter must be removed by a healthcare provider.  Follow-up care  Follow up with your healthcare provider, or as advised.  If a catheter was left in place, it can usually be removed  within 3 to 7 days. Some conditions require that the catheter stays in longer. Your healthcare provider will tell you when to return to have the catheter removed.  When to seek medical advice  Call your healthcare provider right away if any of these occur:    Fever of 100.4 F (38 C) or higher, or as directed by your healthcare provider    Bladder or lower-abdominal pain or fullness    Abdominal swelling, nausea, vomiting, or back pain    Blood or urine leakage around the catheter    Bloody urine coming from the catheter (if a new symptom)    Weakness, dizziness, or fainting    Confusion or change in usual level of alertness    If a catheter was left in place, return if:  ? Catheter falls out  ? Catheter stops draining for 6 hours  Date Last Reviewed: 10/1/2017    6419-6297 [employer]. [address] [place] [address], [place] [address]. All rights reserved. This information is not intended as a substitute for professional medical care. Always follow your healthcare professional's instructions.

## 2018-09-19 ENCOUNTER — OFFICE VISIT (OUTPATIENT)
Dept: INTERNAL MEDICINE | Facility: CLINIC | Age: 83
End: 2018-09-19
Payer: MEDICARE

## 2018-09-19 ENCOUNTER — TELEPHONE (OUTPATIENT)
Dept: SURGERY | Facility: CLINIC | Age: 83
End: 2018-09-19

## 2018-09-19 ENCOUNTER — OFFICE VISIT (OUTPATIENT)
Dept: UROLOGY | Facility: CLINIC | Age: 83
End: 2018-09-19
Payer: MEDICARE

## 2018-09-19 VITALS
OXYGEN SATURATION: 91 % | DIASTOLIC BLOOD PRESSURE: 72 MMHG | HEART RATE: 78 BPM | SYSTOLIC BLOOD PRESSURE: 118 MMHG | TEMPERATURE: 98 F

## 2018-09-19 VITALS — WEIGHT: 100 LBS | BODY MASS INDEX: 20.16 KG/M2 | HEART RATE: 20 BPM | HEIGHT: 59 IN

## 2018-09-19 DIAGNOSIS — R33.9 URINARY RETENTION: Primary | ICD-10-CM

## 2018-09-19 DIAGNOSIS — K59.09 OTHER CONSTIPATION: ICD-10-CM

## 2018-09-19 DIAGNOSIS — Z96.0 URINARY CATHETER IN PLACE: Primary | ICD-10-CM

## 2018-09-19 PROCEDURE — 99213 OFFICE O/P EST LOW 20 MIN: CPT | Performed by: INTERNAL MEDICINE

## 2018-09-19 PROCEDURE — 99212 OFFICE O/P EST SF 10 MIN: CPT | Performed by: PHYSICIAN ASSISTANT

## 2018-09-19 RX ORDER — MAGNESIUM CARB/ALUMINUM HYDROX 105-160MG
296 TABLET,CHEWABLE ORAL ONCE
Qty: 296 ML | Refills: 0 | Status: SHIPPED | OUTPATIENT
Start: 2018-09-19 | End: 2018-09-19

## 2018-09-19 RX ORDER — CIPROFLOXACIN 500 MG/1
500 TABLET, FILM COATED ORAL ONCE
Qty: 1 TABLET | Refills: 0 | Status: SHIPPED | OUTPATIENT
Start: 2018-09-19 | End: 2019-02-19

## 2018-09-19 NOTE — PROGRESS NOTES
UROLOGY    CC: UCO    HPI: 85 y/o pt of Dr. Nuñez's that was diagnosed with superficial bladder cancer several years ago. She has had no recurrence since February 2016. Is due for repeat cystoscopy in October. Here today at the request of Dr. Kiser for cath removal. She was in the ED on 9/17/18 and a Rees was placed for 1L. She was noted to be very constipated at that time. Since then has had multiple bowel movements.     Past Medical History:   Diagnosis Date     Bladder cancer (H)      Cardiomyopathy (H)      CLL (chronic lymphocytic leukemia) (H)      Congestive heart failure (H) 10/24/2014    flash pulm edema ass w/Takotsubo     COPD (chronic obstructive pulmonary disease) (H)     noc O2     Hypothyroid      Mumps      Myocardial infarction 10/2014    Takotsubo presentation w/mild CAD     Pulmonary edema cardiac cause (H) 10/08/2014    associated w/Takotsubo ACS     Tricuspid valve disorders, 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    TR 2-3+ per echo     Current Outpatient Prescriptions   Medication     atorvastatin (LIPITOR) 20 MG tablet     budesonide-formoterol (SYMBICORT) 80-4.5 MCG/ACT Inhaler     ciprofloxacin (CIPRO) 500 MG tablet     denosumab (PROLIA) 60 MG/ML SOLN injection     Diphenhyd-HC-Nystatin-Tetracyc (FIRST-HANS MOUTHWASH) SUSP     ferrous sulfate (IRON) 325 (65 FE) MG tablet     fluconazole (DIFLUCAN) 150 MG tablet     furosemide (LASIX) 20 MG tablet     gabapentin (NEURONTIN) 300 MG capsule     levothyroxine (SYNTHROID/LEVOTHROID) 75 MCG tablet     metoprolol succinate (TOPROL-XL) 25 MG 24 hr tablet     Multiple Vitamins-Minerals (MULTIVITAMIN GUMMIES ADULT PO)     order for DME     polyethylene glycol (MIRALAX) powder     traZODone (DESYREL) 50 MG tablet     albuterol (2.5 MG/3ML) 0.083% neb solution     albuterol (PROAIR HFA/PROVENTIL HFA/VENTOLIN HFA) 108 (90 BASE) MCG/ACT Inhaler     No current facility-administered medications for this visit.      ROS: 5-pt ROS negative other than that  "noted in the HPI    Pulse (!) 20  Ht 1.499 m (4' 11\")  Wt 45.4 kg (100 lb)  BMI 20.2 kg/m2'GEN: NAD, lying in bed  HEENT: EOMI  NECK: Supple  ABD: soft  EXT: No LE edema  : Rees clear    A/P: Urinary retention, resolved constipation, hx of bladder cancer    -Rees removed. Reasons to go to ED were reviewed.   -Keep follow up with Dr. Nuñez.     Guera San PA-C  [facility]    5 min spent with the patient in a face-to-face manner, >50% of the time spent on counseling and coordination of care.      "

## 2018-09-19 NOTE — MR AVS SNAPSHOT
After Visit Summary   9/19/2018    Marie Kline    MRN: [id number]           Patient Information     Date Of Birth          4/28/1932        Visit Information        Provider Department      9/19/2018 1:00 PM Guera San PA-C [facility]        Today's Diagnoses     Urinary retention    -  1       Follow-ups after your visit        Your next 10 appointments already scheduled     Oct 11, 2018 11:10 AM CDT   Cystoscopy with Kar Nuñez MD, UB CYF   [facility] ([facility])    [address]  [address]  [facility] [address] [phone]              Who to contact     If you have questions or need follow up information about today's clinic visit or your schedule please contact [facility] directly at [phone].  Normal or non-critical lab and imaging results will be communicated to you by MyChart, letter or phone within 4 business days after the clinic has received the results. If you do not hear from us within 7 days, please contact the clinic through [employer] or phone. If you have a critical or abnormal lab result, we will notify you by phone as soon as possible.  Submit refill requests through [employer] or call your pharmacy and they will forward the refill request to us. Please allow 3 business days for your refill to be completed.          Additional Information About Your Visit        MyChart Information     [employer] gives you secure access to your electronic health record. If you see a primary care provider, you can also send messages to your care team and make appointments. If you have questions, please call your primary care clinic.  If you do not have a primary care provider, please call [phone] and they will assist you.        Care EveryWhere ID     This is your Care EveryWhere ID. This could be used by other  "organizations to access your [place] medical records  [id number]        Your Vitals Were     Pulse Height BMI (Body Mass Index)             20 1.499 m (4' 11\") 20.2 kg/m2          Blood Pressure from Last 3 Encounters:   09/19/18 118/72   09/17/18 123/72   09/12/18 121/62    Weight from Last 3 Encounters:   09/19/18 45.4 kg (100 lb)   09/05/18 47.6 kg (105 lb)   08/24/18 47.6 kg (105 lb)              Today, you had the following     No orders found for display         Today's Medication Changes          These changes are accurate as of 9/19/18  1:42 PM.  If you have any questions, ask your nurse or doctor.               Start taking these medicines.        Dose/Directions    ciprofloxacin 500 MG tablet   Commonly known as:  CIPRO   Used for:  Urinary retention   Started by:  Guera San PA-C        Dose:  500 mg   Take 1 tablet (500 mg) by mouth once for 1 dose   Quantity:  1 tablet   Refills:  0         These medicines have changed or have updated prescriptions.        Dose/Directions    traZODone 50 MG tablet   Commonly known as:  DESYREL   This may have changed:  how much to take   Used for:  Insomnia, unspecified type        Dose:  50 mg   Take 1 tablet (50 mg) by mouth nightly as needed for sleep   Quantity:  30 tablet   Refills:  0            Where to get your medicines      These medications were sent to [address] [address]     Phone:  [phone]     ciprofloxacin 500 MG tablet                Primary Care Provider Office Phone # Fax #    Piper Kiser [phone]       [facility] [address] [facility] [address]        Equal Access to Services     SHIRAZ ROBBINS : Hadii juan Cast, waaxda luqzulema, qaybta kaalmada eugene, zoe sykes. So [facility] [phone].    ATENCIÓN: Si habla español, tiene a eaton disposición servicios gratuitos de asistencia lingüística. " Carol cuellar [phone].    We comply with applicable federal civil rights laws and [place] laws. We do not discriminate on the basis of race, color, national origin, age, disability, sex, sexual orientation, or gender identity.            Thank you!     Thank you for choosing [facility]  for your care. Our goal is always to provide you with excellent care. Hearing back from our patients is one way we can continue to improve our services. Please take a few minutes to complete the written survey that you may receive in the mail after your visit with us. Thank you!             Your Updated Medication List - Protect others around you: Learn how to safely use, store and throw away your medicines at www.disposemymeds.org.          This list is accurate as of 9/19/18  1:42 PM.  Always use your most recent med list.                   Brand Name Dispense Instructions for use Diagnosis    * albuterol (2.5 MG/3ML) 0.083% neb solution      Take 1 vial by nebulization 2 times daily        * albuterol 108 (90 Base) MCG/ACT inhaler    PROAIR HFA/PROVENTIL HFA/VENTOLIN HFA    1 Inhaler    Inhale 2 puffs into the lungs every 6 hours as needed for wheezing    Intermittent asthma without complication       atorvastatin 20 MG tablet    LIPITOR    90 tablet    Take 1 tablet (20 mg) by mouth daily    ACS (acute coronary syndrome) (H), Hyperlipidemia with target LDL less than 130       budesonide-formoterol 80-4.5 MCG/ACT Inhaler    SYMBICORT    3 Inhaler    Inhale 2 puffs into the lungs 2 times daily    COPD exacerbation (H)       ciprofloxacin 500 MG tablet    CIPRO    1 tablet    Take 1 tablet (500 mg) by mouth once for 1 dose    Urinary retention       denosumab 60 MG/ML Soln injection    PROLIA    1 mL    Inject 1 mL (60 mg) Subcutaneous every 6 months    Senile osteoporosis, CKD (chronic kidney disease) stage 3, GFR 30-59 ml/min       ferrous sulfate 325 (65 Fe) MG tablet    IRON      Take by mouth daily (with breakfast)        [facility] MOUTHWASH Susp     237 mL    Swish and swallow 5-10 mLs in mouth every 6 hours as needed    Thrush       fluconazole 150 MG tablet    DIFLUCAN    7 tablet    Take 1 tablet (150 mg) by mouth daily    Mouth sores       furosemide 20 MG tablet    LASIX    90 tablet    TAKE ONE TABLET BY MOUTH ONE TIME DAILY    Localized edema       gabapentin 300 MG capsule    NEURONTIN    90 capsule    Take 1 capsule (300 mg) by mouth daily as needed    Hip pain, left, Lumbar radicular pain       levothyroxine 75 MCG tablet    SYNTHROID/LEVOTHROID    90 tablet    Take 1 tablet (75 mcg) by mouth daily    Other specified hypothyroidism       metoprolol succinate 25 MG 24 hr tablet    TOPROL-XL    90 tablet    Take 1 tablet (25 mg) by mouth daily    ACS (acute coronary syndrome) (H)       MULTIVITAMIN GUMMIES ADULT PO           order for DME     1 each    Equipment being ordered: Nebulizer Fax order to [facility] [phone]    COPD exacerbation (H)       polyethylene glycol powder    MIRALAX    527 g    Take 17 g (1 capful) by mouth daily        traZODone 50 MG tablet    DESYREL    30 tablet    Take 1 tablet (50 mg) by mouth nightly as needed for sleep    Insomnia, unspecified type       * Notice:  This list has 2 medication(s) that are the same as other medications prescribed for you. Read the directions carefully, and ask your doctor or other care provider to review them with you.

## 2018-09-19 NOTE — TELEPHONE ENCOUNTER
Referral received from CRUZ DAVALOS  for REMOVAL OF R INGUINAL NODE.    Attempt #1:    Called patient at [phone] (M) . Spoke with patient he has been ill with SBO and will call clinic when she is ready to schedule at [phone].  Called and informed NORMA . Casandra

## 2018-09-19 NOTE — PROGRESS NOTES
SUBJECTIVE:   Marie Kline is a 86 year old female who presents to clinic today for the following health issues:    Patient is present with her daughter.    ED/UC Followup:    Facility:  [facility]  Date of visit: 09/17/2018  Reason for visit: Constipation  Current Status: better     The patient had went to the ER since she was unable to defecate or urinate.   The patient had a negative UA.   CT scan of ab/pelvic revealed:  IMPRESSION:  1. Large amount of rectal stool, otherwise moderate overall stool.  2. Minimal improvement in splenomegaly since the comparison study.    Patient was discharged with urinary catheter in place and miralax to take.    She reports that she had quite a bit of stool yesterday.  She had went approximately 5 times yesterday.  She has a urinary catheter in place still.  No fevers or chills.  She feels less full.  No abdominal pain.     Problem list and histories reviewed & adjusted, as indicated.    Reviewed and updated as needed this visit by clinical staff       Reviewed and updated as needed this visit by Provider         ROS:  CONSTITUTIONAL: NEGATIVE for fever, chills, change in weight  RESP: NEGATIVE for significant cough or SOB  CV: NEGATIVE for chest pain, palpitations or peripheral edema  GI: feels less full  : catheter in place    OBJECTIVE:     /72  Pulse 78  Temp 98  F (36.7  C) (Oral)  SpO2 91%  There is no height or weight on file to calculate BMI.   GENERAL: healthy, alert and no distress  NECK: no adenopathy, no asymmetry, masses, or scars and thyroid normal to palpation  RESP: lungs clear to auscultation - no rales, rhonchi or wheezes  CV: regular rate and rhythm, normal S1 S2, no S3 or S4, no murmur, click or rub    ASSESSMENT:     (Z92.89) Urinary catheter in place  (primary encounter diagnosis)  Comment: urinary retention likely secondary to constipation  Plan: okay to remove catheter    (K59.09) Other constipation  Comment: improved  Plan: monitor          Piper  Catracho Kiser MD  [facility]

## 2018-09-19 NOTE — MR AVS SNAPSHOT
After Visit Summary   9/19/2018    Marie Kline    MRN: [id number]           Patient Information     Date Of Birth          4/28/1932        Visit Information        Provider Department      9/19/2018 11:00 AM Piper Kiser MD [facility]        Today's Diagnoses     Urinary catheter in place    -  1    Other constipation          Care Instructions    miralax first    Consider magnesium (just take 1/2 bottle to start with)            Follow-ups after your visit        Your next 10 appointments already scheduled     Oct 11, 2018 11:10 AM CDT   Cystoscopy with Kar Nuñez MD, UB CYF   [facility] [place] ([facility])    [address]  [address]  [facility] [address] [phone]              Who to contact     If you have questions or need follow up information about today's clinic visit or your schedule please contact [facility] directly at [phone].  Normal or non-critical lab and imaging results will be communicated to you by MyChart, letter or phone within 4 business days after the clinic has received the results. If you do not hear from us within 7 days, please contact the clinic through [employer] or phone. If you have a critical or abnormal lab result, we will notify you by phone as soon as possible.  Submit refill requests through [employer] or call your pharmacy and they will forward the refill request to us. Please allow 3 business days for your refill to be completed.          Additional Information About Your Visit        MyChart Information     [employer] gives you secure access to your electronic health record. If you see a primary care provider, you can also send messages to your care team and make appointments. If you have questions, please call your primary care clinic.  If you do not have a primary care provider, please call [phone] and they will assist you.        Care  EveryWhere ID     This is your Care EveryWhere ID. This could be used by other organizations to access your [facility] medical records  [id number]        Your Vitals Were     Pulse Temperature Pulse Oximetry             78 98  F (36.7  C) (Oral) 91%          Blood Pressure from Last 3 Encounters:   09/19/18 118/72   09/17/18 123/72   09/12/18 121/62    Weight from Last 3 Encounters:   09/19/18 100 lb (45.4 kg)   09/05/18 105 lb (47.6 kg)   08/24/18 105 lb (47.6 kg)              Today, you had the following     No orders found for display         Today's Medication Changes          These changes are accurate as of 9/19/18 11:59 PM.  If you have any questions, ask your nurse or doctor.               Start taking these medicines.        Dose/Directions    ciprofloxacin 500 MG tablet   Commonly known as:  CIPRO   Used for:  Urinary retention   Started by:  Guera San PA-C        Dose:  500 mg   Take 1 tablet (500 mg) by mouth once for 1 dose   Quantity:  1 tablet   Refills:  0         These medicines have changed or have updated prescriptions.        Dose/Directions    traZODone 50 MG tablet   Commonly known as:  DESYREL   This may have changed:  how much to take   Used for:  Insomnia, unspecified type        Dose:  50 mg   Take 1 tablet (50 mg) by mouth nightly as needed for sleep   Quantity:  30 tablet   Refills:  0            Where to get your medicines      These medications were sent to [address]  [address] [address]     Phone:  [phone]     ciprofloxacin 500 MG tablet                Primary Care Provider Office Phone # Fax #    Piper Kiser [phone]       [address] [facility] [address]        Equal Access to Services     SHIRAZ ROBBINS AH: Claudia Cast, wabecca lugeoff, qaybta kaalmaralph knight, zoe sykes. So [facility] [phone].    ATENCIÓN: Nette broussard  español, tiene a eaton disposición servicios gratuitos de asistencia lingüística. Carol cuellar [phone].    We comply with applicable federal civil rights laws and [place] laws. We do not discriminate on the basis of race, color, national origin, age, disability, sex, sexual orientation, or gender identity.            Thank you!     Thank you for choosing [facility]  for your care. Our goal is always to provide you with excellent care. Hearing back from our patients is one way we can continue to improve our services. Please take a few minutes to complete the written survey that you may receive in the mail after your visit with us. Thank you!             Your Updated Medication List - Protect others around you: Learn how to safely use, store and throw away your medicines at www.disposemymeds.org.          This list is accurate as of 9/19/18 11:59 PM.  Always use your most recent med list.                   Brand Name Dispense Instructions for use Diagnosis    * albuterol (2.5 MG/3ML) 0.083% neb solution      Take 1 vial by nebulization 2 times daily        * albuterol 108 (90 Base) MCG/ACT inhaler    PROAIR HFA/PROVENTIL HFA/VENTOLIN HFA    1 Inhaler    Inhale 2 puffs into the lungs every 6 hours as needed for wheezing    Intermittent asthma without complication       atorvastatin 20 MG tablet    LIPITOR    90 tablet    Take 1 tablet (20 mg) by mouth daily    ACS (acute coronary syndrome) (H), Hyperlipidemia with target LDL less than 130       budesonide-formoterol 80-4.5 MCG/ACT Inhaler    SYMBICORT    3 Inhaler    Inhale 2 puffs into the lungs 2 times daily    COPD exacerbation (H)       ciprofloxacin 500 MG tablet    CIPRO    1 tablet    Take 1 tablet (500 mg) by mouth once for 1 dose    Urinary retention       denosumab 60 MG/ML Soln injection    PROLIA    1 mL    Inject 1 mL (60 mg) Subcutaneous every 6 months    Senile osteoporosis, CKD (chronic kidney disease) stage 3, GFR 30-59 ml/min        ferrous sulfate 325 (65 Fe) MG tablet    IRON     Take by mouth daily (with breakfast)        FIRST-HANS MOUTHWASH Susp     237 mL    Swish and swallow 5-10 mLs in mouth every 6 hours as needed    Thrush       fluconazole 150 MG tablet    DIFLUCAN    7 tablet    Take 1 tablet (150 mg) by mouth daily    Mouth sores       furosemide 20 MG tablet    LASIX    90 tablet    TAKE ONE TABLET BY MOUTH ONE TIME DAILY    Localized edema       gabapentin 300 MG capsule    NEURONTIN    90 capsule    Take 1 capsule (300 mg) by mouth daily as needed    Hip pain, left, Lumbar radicular pain       levothyroxine 75 MCG tablet    SYNTHROID/LEVOTHROID    90 tablet    Take 1 tablet (75 mcg) by mouth daily    Other specified hypothyroidism       metoprolol succinate 25 MG 24 hr tablet    TOPROL-XL    90 tablet    Take 1 tablet (25 mg) by mouth daily    ACS (acute coronary syndrome) (H)       MULTIVITAMIN GUMMIES ADULT PO           order for DME     1 each    Equipment being ordered: Nebulizer Fax order to [facility] [phone]    COPD exacerbation (H)       polyethylene glycol powder    MIRALAX    527 g    Take 17 g (1 capful) by mouth daily        traZODone 50 MG tablet    DESYREL    30 tablet    Take 1 tablet (50 mg) by mouth nightly as needed for sleep    Insomnia, unspecified type       * Notice:  This list has 2 medication(s) that are the same as other medications prescribed for you. Read the directions carefully, and ask your doctor or other care provider to review them with you.

## 2018-09-19 NOTE — LETTER
9/19/2018       RE: Marie Kline  [address]  [facility] [address]     Dear Colleague,    Thank you for referring your patient, Marie Kline, to the [facility] [place] at [facility]. Please see a copy of my visit note below.    UROLOGY    CC: UCO    HPI: 87 y/o pt of Dr. Nuñez's that was diagnosed with superficial bladder cancer several years ago. She has had no recurrence since February 2016. Is due for repeat cystoscopy in October. Here today at the request of Dr. Kiser for cath removal. She was in the ED on 9/17/18 and a Rees was placed for 1L. She was noted to be very constipated at that time. Since then has had multiple bowel movements.     Past Medical History:   Diagnosis Date     Bladder cancer (H)      Cardiomyopathy (H)      CLL (chronic lymphocytic leukemia) (H)      Congestive heart failure (H) 10/24/2014    flash pulm edema ass w/Takotsubo     COPD (chronic obstructive pulmonary disease) (H)     noc O2     Hypothyroid      Mumps      Myocardial infarction 10/2014    Takotsubo presentation w/mild CAD     Pulmonary edema cardiac cause (H) 10/08/2014    associated w/Takotsubo ACS     Tricuspid valve disorders, 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    TR 2-3+ per echo     Current Outpatient Prescriptions   Medication     atorvastatin (LIPITOR) 20 MG tablet     budesonide-formoterol (SYMBICORT) 80-4.5 MCG/ACT Inhaler     ciprofloxacin (CIPRO) 500 MG tablet     denosumab (PROLIA) 60 MG/ML SOLN injection     Diphenhyd-HC-Nystatin-Tetracyc (FIRST-HANS MOUTHWASH) SUSP     ferrous sulfate (IRON) 325 (65 FE) MG tablet     fluconazole (DIFLUCAN) 150 MG tablet     furosemide (LASIX) 20 MG tablet     gabapentin (NEURONTIN) 300 MG capsule     levothyroxine (SYNTHROID/LEVOTHROID) 75 MCG tablet     metoprolol succinate (TOPROL-XL) 25 MG 24 hr tablet     Multiple Vitamins-Minerals (MULTIVITAMIN GUMMIES ADULT PO)     order for  "DME     polyethylene glycol (MIRALAX) powder     traZODone (DESYREL) 50 MG tablet     albuterol (2.5 MG/3ML) 0.083% neb solution     albuterol (PROAIR HFA/PROVENTIL HFA/VENTOLIN HFA) 108 (90 BASE) MCG/ACT Inhaler     No current facility-administered medications for this visit.      ROS: 5-pt ROS negative other than that noted in the HPI    Pulse (!) 20  Ht 1.499 m (4' 11\")  Wt 45.4 kg (100 lb)  BMI 20.2 kg/m2'GEN: NAD, lying in bed  HEENT: EOMI  NECK: Supple  ABD: soft  EXT: No LE edema  : Rees clear    A/P: Urinary retention, resolved constipation, hx of bladder cancer    -Rees removed. Reasons to go to ED were reviewed.   -Keep follow up with Dr. Nuñez.     Guera San PA-C  [facility]    5 min spent with the patient in a face-to-face manner, >50% of the time spent on counseling and coordination of care.    "

## 2018-09-19 NOTE — NURSING NOTE
Was seen in Ed for retention , Tatum was placed for over 1000 ml drainage . Was constipated but bowels moving well now. Saw primary today who sent her over to have tatum removed. Urine clear in drainage bag Balloon deflated and tatum removed ease. Instructed patient to go to ED if unable to void . Keep upcoming appointment with Dr Nuñez  In  A few weeks. Take Cipro today . Berna James LPN

## 2018-09-20 ENCOUNTER — TELEPHONE (OUTPATIENT)
Dept: INTERNAL MEDICINE | Facility: CLINIC | Age: 83
End: 2018-09-20

## 2018-09-20 NOTE — TELEPHONE ENCOUNTER
Jing with [facility] calling.  Asking for orders for skilled nurse visits 2x/wk x 2 wks and 1x/wk x 1 wk for management of nutrition and constipation.    I ok'd.

## 2018-09-25 ENCOUNTER — TELEPHONE (OUTPATIENT)
Dept: INTERNAL MEDICINE | Facility: CLINIC | Age: 83
End: 2018-09-25

## 2018-09-27 ENCOUNTER — TELEPHONE (OUTPATIENT)
Dept: INTERNAL MEDICINE | Facility: CLINIC | Age: 83
End: 2018-09-27

## 2018-09-27 DIAGNOSIS — M81.0 SENILE OSTEOPOROSIS: Primary | ICD-10-CM

## 2018-09-27 NOTE — TELEPHONE ENCOUNTER
Patient is due for next Prolia injection.   Insurance re-verification submitted via [employer] web site. Awaiting response.

## 2018-09-28 ENCOUNTER — TELEPHONE (OUTPATIENT)
Dept: INTERNAL MEDICINE | Facility: CLINIC | Age: 83
End: 2018-09-28

## 2018-09-28 DIAGNOSIS — J20.9 ACUTE BRONCHITIS, UNSPECIFIED ORGANISM: Primary | ICD-10-CM

## 2018-09-28 RX ORDER — AZITHROMYCIN 250 MG/1
TABLET, FILM COATED ORAL
Qty: 6 TABLET | Refills: 0 | Status: SHIPPED | OUTPATIENT
Start: 2018-09-28 | End: 2018-10-18

## 2018-09-28 NOTE — TELEPHONE ENCOUNTER
zpak sent to pharmacy.  Patient reports that her oxygen is better since she coughed some phlegm up.    Oxygen saturation is 92%      Told her I recommend she go to ER if symptoms worsen  She is not wanting to go to the ER right now.

## 2018-09-28 NOTE — TELEPHONE ENCOUNTER
Lorraine with [facility] calling.  States patient has a productive cough with large amounts of clear sputum x 2 wks.  Lung sounds clear.    O2 sat today was 86% on room air.  After nebulizer treatment, O2 sat was still 86%.  Patient started her oxygen @ 2 1/2 L and O2 sat increased to 91%.    Patient would like a medication for her cough.    Please advise, thanks.

## 2018-09-30 ENCOUNTER — MYC MEDICAL ADVICE (OUTPATIENT)
Dept: INTERNAL MEDICINE | Facility: CLINIC | Age: 83
End: 2018-09-30

## 2018-10-01 ENCOUNTER — TRANSFERRED RECORDS (OUTPATIENT)
Dept: HEALTH INFORMATION MANAGEMENT | Facility: CLINIC | Age: 83
End: 2018-10-01

## 2018-10-01 NOTE — TELEPHONE ENCOUNTER
"See telephone encounter 9-28-18.    Spoke with patient.  She is taking the Zpack and has 1 tab left to take.  She doesn't feel she needs to be seen.      Had oncology infusion today.  States Dr. Ivory sent in a prescription for prednisone and another medication \"for her stomach\".  States she does not have a fever.    She did not want to schedule an appointment.  Will call as needed.            "

## 2018-10-02 ENCOUNTER — TELEPHONE (OUTPATIENT)
Dept: INTERNAL MEDICINE | Facility: CLINIC | Age: 83
End: 2018-10-02

## 2018-10-02 NOTE — TELEPHONE ENCOUNTER
Zoila with [facility] calls. Requesting date of last office visit for face to face documentation. Informed her last visit was on 9/19/18 with Dr. Kiser. Zoila requesting copy of visit note be faxed to them - fax [phone].    Office visit note faxed as requested.

## 2018-10-02 NOTE — TELEPHONE ENCOUNTER
Prolia verification received. No PA needed.     Labs done 3/12/18 prior to 3/21/18 Prolia injection.   Routed to provider to review if labs need to be repeated prior to Prolia injection.

## 2018-10-03 ENCOUNTER — TELEPHONE (OUTPATIENT)
Dept: INTERNAL MEDICINE | Facility: CLINIC | Age: 83
End: 2018-10-03

## 2018-10-03 NOTE — TELEPHONE ENCOUNTER
Patient states that Christelle is stating she is overdue for her Heart Failure Action Plan.  Patient states she has not seen Dr. Ortega for a few years and feels like her heart is doing pretty well. Patient asks if she needs to do anything for this.

## 2018-10-03 NOTE — TELEPHONE ENCOUNTER
Contacted patient.     Prolia Checklist:  Last Injection: 3/21/18  Coverage: PA not needed  Labs (Ca, Mg, Ph) reviewed by provider: done 3/12/18, repeat labs not needed per Dr. Kiser  Medication Order: Done  Dental work involving bone in past month or will have work in the next 6 months: No  Future Appt Scheduled for Injection: scheduled for 10/11/18

## 2018-10-08 ENCOUNTER — TELEPHONE (OUTPATIENT)
Dept: INTERNAL MEDICINE | Facility: CLINIC | Age: 83
End: 2018-10-08

## 2018-10-08 PROCEDURE — 99207 C MD CERTIFICATION HHA PATIENT: CPT | Performed by: INTERNAL MEDICINE

## 2018-10-09 DIAGNOSIS — B37.0 THRUSH: ICD-10-CM

## 2018-10-09 NOTE — TELEPHONE ENCOUNTER
Magic Mouthwash      Last Written Prescription Date:  2/28/18  Last Fill Quantity: 237mL,   # refills: 1  Last Office Visit: 9/19/18  Future Office visit:    Next 5 appointments (look out 90 days)     Oct 11, 2018 10:00 AM CDT   Nurse Only with Ri Rn   [facility]    [address]  [facility] [address]   [phone]                   Routing refill request to provider for review/approval because:  Drug not on the [facility], [facility] or  Health refill protocol or controlled substance

## 2018-10-11 ENCOUNTER — ALLIED HEALTH/NURSE VISIT (OUTPATIENT)
Dept: NURSING | Facility: CLINIC | Age: 83
End: 2018-10-11
Payer: MEDICARE

## 2018-10-11 ENCOUNTER — TELEPHONE (OUTPATIENT)
Dept: INTERNAL MEDICINE | Facility: CLINIC | Age: 83
End: 2018-10-11

## 2018-10-11 ENCOUNTER — OFFICE VISIT (OUTPATIENT)
Dept: UROLOGY | Facility: CLINIC | Age: 83
End: 2018-10-11
Payer: MEDICARE

## 2018-10-11 VITALS — HEIGHT: 59 IN | WEIGHT: 100 LBS | BODY MASS INDEX: 20.16 KG/M2

## 2018-10-11 DIAGNOSIS — Z85.51 PERSONAL HISTORY OF MALIGNANT NEOPLASM OF BLADDER: Primary | ICD-10-CM

## 2018-10-11 DIAGNOSIS — N18.30 CKD (CHRONIC KIDNEY DISEASE) STAGE 3, GFR 30-59 ML/MIN (H): ICD-10-CM

## 2018-10-11 DIAGNOSIS — M81.0 SENILE OSTEOPOROSIS: ICD-10-CM

## 2018-10-11 PROCEDURE — 52000 CYSTOURETHROSCOPY: CPT | Performed by: UROLOGY

## 2018-10-11 PROCEDURE — 96372 THER/PROPH/DIAG INJ SC/IM: CPT

## 2018-10-11 ASSESSMENT — PAIN SCALES - GENERAL: PAINLEVEL: NO PAIN (0)

## 2018-10-11 NOTE — MR AVS SNAPSHOT
After Visit Summary   10/11/2018    Marie Kline    MRN: [id number]           Patient Information     Date Of Birth          4/28/1932        Visit Information        Provider Department      10/11/2018 10:00 AM Rn, Ri [facility]        Today's Diagnoses     Senile osteoporosis        CKD (chronic kidney disease) stage 3, GFR 30-59 ml/min (H)           Follow-ups after your visit        Your next 10 appointments already scheduled     Oct 11, 2018 11:10 AM CDT   Cystoscopy with Kar Nuñez MD, UB CYF   [facility] [place] ([facility])    [address] [facility] [facility]  [address]  [facility] [address] [phone]              Who to contact     If you have questions or need follow up information about today's clinic visit or your schedule please contact [facility] directly at [phone].  Normal or non-critical lab and imaging results will be communicated to you by MyChart, letter or phone within 4 business days after the clinic has received the results. If you do not hear from us within 7 days, please contact the clinic through MyChart or phone. If you have a critical or abnormal lab result, we will notify you by phone as soon as possible.  Submit refill requests through [employer] or call your pharmacy and they will forward the refill request to us. Please allow 3 business days for your refill to be completed.          Additional Information About Your Visit        Care EveryWhere ID     This is your Care EveryWhere ID. This could be used by other organizations to access your [place] medical records  [id number]         Blood Pressure from Last 3 Encounters:   09/19/18 118/72   09/17/18 123/72   09/12/18 121/62    Weight from Last 3 Encounters:   09/19/18 100 lb (45.4 kg)   09/05/18 105 lb (47.6 kg)   08/24/18 105 lb (47.6 kg)              We Performed the Following     C DENOSUMAB INJECTION, 1 MG      INJECTION INTRAMUSCULAR OR SUB-Q          Today's Medication Changes          These changes are accurate as of 10/11/18 10:26 AM.  If you have any questions, ask your nurse or doctor.               These medicines have changed or have updated prescriptions.        Dose/Directions    traZODone 50 MG tablet   Commonly known as:  DESYREL   This may have changed:  how much to take   Used for:  Insomnia, unspecified type        Dose:  50 mg   Take 1 tablet (50 mg) by mouth nightly as needed for sleep   Quantity:  30 tablet   Refills:  0                Primary Care Provider Office Phone # Fax #    Piper Kiser [phone]       [address] [facility] [address]        Equal Access to Services     [facility] : Hadii juan Cast, wabecca clayton, qamarcos kaalmada eugene, zoe hyatt . So [facility] [phone].    ATENCIÓN: Si habla español, tiene a eaton disposición servicios gratuitos de asistencia lingüística. Llame al [phone].    We comply with applicable federal civil rights laws and [place] laws. We do not discriminate on the basis of race, color, national origin, age, disability, sex, sexual orientation, or gender identity.            Thank you!     Thank you for choosing [facility]  for your care. Our goal is always to provide you with excellent care. Hearing back from our patients is one way we can continue to improve our services. Please take a few minutes to complete the written survey that you may receive in the mail after your visit with us. Thank you!             Your Updated Medication List - Protect others around you: Learn how to safely use, store and throw away your medicines at www.disposemymeds.org.          This list is accurate as of 10/11/18 10:26 AM.  Always use your most recent med list.                   Brand Name Dispense Instructions for use Diagnosis    * albuterol (2.5 MG/3ML) 0.083% neb solution       Take 1 vial by nebulization 2 times daily        * albuterol 108 (90 Base) MCG/ACT inhaler    PROAIR HFA/PROVENTIL HFA/VENTOLIN HFA    1 Inhaler    Inhale 2 puffs into the lungs every 6 hours as needed for wheezing    Intermittent asthma without complication       atorvastatin 20 MG tablet    LIPITOR    90 tablet    Take 1 tablet (20 mg) by mouth daily    ACS (acute coronary syndrome) (H), Hyperlipidemia with target LDL less than 130       azithromycin 250 MG tablet    ZITHROMAX    6 tablet    Two tablets first day, then one tablet daily for four days.    Acute bronchitis, unspecified organism       budesonide-formoterol 80-4.5 MCG/ACT Inhaler    SYMBICORT    3 Inhaler    Inhale 2 puffs into the lungs 2 times daily    COPD exacerbation (H)       denosumab 60 MG/ML Soln injection    PROLIA    1 mL    Inject 1 mL (60 mg) Subcutaneous every 6 months    Senile osteoporosis, CKD (chronic kidney disease) stage 3, GFR 30-59 ml/min (H)       ferrous sulfate 325 (65 Fe) MG tablet    IRON     Take by mouth daily (with breakfast)        FIRST-HANS MOUTHWASH Susp     237 mL    Swish and swallow 5-10 mLs in mouth every 6 hours as needed    Thrush       fluconazole 150 MG tablet    DIFLUCAN    7 tablet    Take 1 tablet (150 mg) by mouth daily    Mouth sores       furosemide 20 MG tablet    LASIX    90 tablet    TAKE ONE TABLET BY MOUTH ONE TIME DAILY    Localized edema       gabapentin 300 MG capsule    NEURONTIN    90 capsule    Take 1 capsule (300 mg) by mouth daily as needed    Hip pain, left, Lumbar radicular pain       levothyroxine 75 MCG tablet    SYNTHROID/LEVOTHROID    90 tablet    Take 1 tablet (75 mcg) by mouth daily    Other specified hypothyroidism       metoprolol succinate 25 MG 24 hr tablet    TOPROL-XL    90 tablet    Take 1 tablet (25 mg) by mouth daily    ACS (acute coronary syndrome) (H)       MULTIVITAMIN GUMMIES ADULT PO           order for DME     1 each    Equipment being ordered: Nebulizer Fax order to  [facility] [phone]    COPD exacerbation (H)       polyethylene glycol powder    MIRALAX    527 g    Take 17 g (1 capful) by mouth daily        traZODone 50 MG tablet    DESYREL    30 tablet    Take 1 tablet (50 mg) by mouth nightly as needed for sleep    Insomnia, unspecified type       * Notice:  This list has 2 medication(s) that are the same as other medications prescribed for you. Read the directions carefully, and ask your doctor or other care provider to review them with you.

## 2018-10-11 NOTE — TELEPHONE ENCOUNTER
Lorraine, nurse with [facility], calls to report patient has been discharged from home care today due to patient meeting all her goals.    FYI to provider.

## 2018-10-11 NOTE — PROGRESS NOTES
Marie is a pleasant 86-year-old female with a history of superficial bladder cancer. She also had urinary retention earlier this year and seems to be voiding well now. She's had no hematuria or dysuria.  Other past medical history: Medications and allergies reviewed  Urinalysis: Unable to void for sample  Flexible cystoscopy-normal urethral and bladder mucosa with no papillary changes are raised erythema. Ureteral orifices normal  Exam: Alert and oriented, with daughter, normal appearance, normal respirations, normal external genitalia  Assessment: No evidence for recurrent transitional cell carcinoma bladder  Plan: Follow-up in 6 months for cystoscopy and urine cytology. Antibiotic ×1 today

## 2018-10-11 NOTE — PATIENT INSTRUCTIONS
"     AFTER YOUR CYSTOSCOPY         You have just completed a cystoscopy, or \"cysto\", which allowed your physician to learn more about your bladder (or to remove a stent placed after surgery). We suggest that you continue to avoid caffeine, fruit juice, and alcohol for the next 24 hours, however, you are encouraged to return to your normal activities.       A few things that are considered normal after your cystoscopy:    * small amount of bleeding (or spotting) that clears within the next 24 hours    * slight burning sensation with urination    * sensation to of needing to avoid more frequently    * the feeling of \"air\" in your urine    * mild discomfort that is relieved with Tylonol        Please contact our office promptly if you:    * develop a fever above 101 degrees    * are unable to urinate    * develop bright red blood that does not stop    * severe pain or swelling        And of course, please contact our office with any concerns or questions [phone]        "

## 2018-10-11 NOTE — NURSING NOTE
Pt uncomfortable on the R abd area.  Dr. Ivory wants a lymph node removed.  Prior to the start of the procedure and with procedural staff participation, I verbally confirmed the patient s identity using two indicators, relevant allergies, that the procedure was appropriate and matched the consent or emergent situation, and that the correct equipment/implants were available. Immediately prior to starting the procedure I conducted the Time Out with the procedural staff and re-confirmed the patient s name, procedure, and site/side. (The Joint Commission universal protocol was followed.)  Yes    Sedation (Moderate or Deep): None  MOISE Best CMA  Pt has signed the consent form stating that we will be doing a CYSTOSCOPY (with or without stent removal) today, and that it is the correct procedure. I verbally confirmed the patient s identity using two indicators, relevant allergies, and that the correct equipment was available. Post-op information given to the pt as needed at check-out. I have sent an appropriate antibiotic to the pharmacy in our building as recommended by the MD. MOISE Best CMA

## 2018-10-11 NOTE — LETTER
10/11/2018       RE: Marie Kline  [address]  [facility] [address]     Dear Colleague,    Thank you for referring your patient, Marie Kline, to the [facility] [place] at [facility]. Please see a copy of my visit note below.    Marie is a pleasant 86-year-old female with a history of superficial bladder cancer. She also had urinary retention earlier this year and seems to be voiding well now. She's had no hematuria or dysuria.  Other past medical history: Medications and allergies reviewed  Urinalysis: Unable to void for sample  Flexible cystoscopy-normal urethral and bladder mucosa with no papillary changes are raised erythema. Ureteral orifices normal  Exam: Alert and oriented, with daughter, normal appearance, normal respirations, normal external genitalia  Assessment: No evidence for recurrent transitional cell carcinoma bladder  Plan: Follow-up in 6 months for cystoscopy and urine cytology. Antibiotic ×1 today        Kar Nuñez MD

## 2018-10-11 NOTE — NURSING NOTE
Indication: Prolia  (denosumab) is a prescription medicine used to treat osteoporosis in patients who:     Are at high risk for fracture, meaning patients who have had a fracture related to osteoporosis, or who have multiple risk factors for fracture     Cannot use another osteoporosis medicine or other osteoporosis medicines did not work well   The timeline for early/late injections would be 4 weeks early and any time after the 6 month manisha. If a patient receives their injection late, then the subsequent injection would be 6 months from the date that they actually received the injection    1.  When was the last injection?  3-12-18  2.  Did they check with their insurance for this calendar year?  yes  3.  Is there an order in the chart?  yes  4.  Has the patient had dental work involving the bone in the past month or will have work in the next 6 months?  no  5.  Did you have the patient wait 15 minutes after the injection?  yes  6.  Remember to use .injection under the medication notes    The following steps were completed to comply with the REMS program for Prolia:    Reviewed information in the Medication Guide and Patient Counseling Chart, including the serious risks of Prolia  and the symptoms of each risk.    Advised patient to seek prompt medical attention if they have signs or symptoms of any of the serious risks.  Provided each patient a copy of the Medication Guide and Patient Brochure.

## 2018-10-11 NOTE — MR AVS SNAPSHOT
"              After Visit Summary   10/11/2018    Marie Kline    MRN: [id number]           Patient Information     Date Of Birth          4/28/1932        Visit Information        Provider Department      10/11/2018 11:10 AM Kar Nuñez MD; [facility] CYF [facility] [place]        Today's Diagnoses     Personal history of malignant neoplasm of bladder    -  1      Care Instructions         AFTER YOUR CYSTOSCOPY         You have just completed a cystoscopy, or \"cysto\", which allowed your physician to learn more about your bladder (or to remove a stent placed after surgery). We suggest that you continue to avoid caffeine, fruit juice, and alcohol for the next 24 hours, however, you are encouraged to return to your normal activities.       A few things that are considered normal after your cystoscopy:    * small amount of bleeding (or spotting) that clears within the next 24 hours    * slight burning sensation with urination    * sensation to of needing to avoid more frequently    * the feeling of \"air\" in your urine    * mild discomfort that is relieved with Tylonol        Please contact our office promptly if you:    * develop a fever above 101 degrees    * are unable to urinate    * develop bright red blood that does not stop    * severe pain or swelling        And of course, please contact our office with any concerns or questions [phone]                Follow-ups after your visit        Follow-up notes from your care team     Return in about 6 months (around 4/11/2019) for BTC.      Who to contact     If you have questions or need follow up information about today's clinic visit or your schedule please contact [facility] UROLOG[facility] directly at [phone].  Normal or non-critical lab and imaging results will be communicated to you by MyChart, letter or phone within 4 business days after the clinic has received the results. If you do not " "hear from us within 7 days, please contact the clinic through Paltalkt or phone. If you have a critical or abnormal lab result, we will notify you by phone as soon as possible.  Submit refill requests through Filmmortal or call your pharmacy and they will forward the refill request to us. Please allow 3 business days for your refill to be completed.          Additional Information About Your Visit        Care EveryWhere ID     This is your Care EveryWhere ID. This could be used by other organizations to access your [place] medical records  [id number]        Your Vitals Were     Height BMI (Body Mass Index)                1.499 m (4' 11\") 20.2 kg/m2           Blood Pressure from Last 3 Encounters:   09/19/18 118/72   09/17/18 123/72   09/12/18 121/62    Weight from Last 3 Encounters:   10/11/18 45.4 kg (100 lb)   09/19/18 45.4 kg (100 lb)   09/05/18 47.6 kg (105 lb)              We Performed the Following     CYSTOURETHROSCOPY (15503)          Today's Medication Changes          These changes are accurate as of 10/11/18 11:16 AM.  If you have any questions, ask your nurse or doctor.               These medicines have changed or have updated prescriptions.        Dose/Directions    traZODone 50 MG tablet   Commonly known as:  DESYREL   This may have changed:  how much to take   Used for:  Insomnia, unspecified type        Dose:  50 mg   Take 1 tablet (50 mg) by mouth nightly as needed for sleep   Quantity:  30 tablet   Refills:  0                Primary Care Provider Office Phone # Fax #    Piper Kiser [phone]       [facility] [address] [facility] [address]        Equal Access to Services     [facility]: Hadii juan ku hadasho Somayali, waaxda luqadaha, qaybta kaalmada eugene, zoe sykes. So [facility] [phone].    ATENCIÓN: Si habla español, tiene a eaton disposición servicios gratuitos de asistencia lingüística. Llame al [phone].    We comply with " applicable federal civil rights laws and [place] laws. We do not discriminate on the basis of race, color, national origin, age, disability, sex, sexual orientation, or gender identity.            Thank you!     Thank you for choosing [facility] [place]  for your care. Our goal is always to provide you with excellent care. Hearing back from our patients is one way we can continue to improve our services. Please take a few minutes to complete the written survey that you may receive in the mail after your visit with us. Thank you!             Your Updated Medication List - Protect others around you: Learn how to safely use, store and throw away your medicines at www.disposemymeds.org.          This list is accurate as of 10/11/18 11:16 AM.  Always use your most recent med list.                   Brand Name Dispense Instructions for use Diagnosis    * albuterol (2.5 MG/3ML) 0.083% neb solution      Take 1 vial by nebulization 2 times daily        * albuterol 108 (90 Base) MCG/ACT inhaler    PROAIR HFA/PROVENTIL HFA/VENTOLIN HFA    1 Inhaler    Inhale 2 puffs into the lungs every 6 hours as needed for wheezing    Intermittent asthma without complication       atorvastatin 20 MG tablet    LIPITOR    90 tablet    Take 1 tablet (20 mg) by mouth daily    ACS (acute coronary syndrome) (H), Hyperlipidemia with target LDL less than 130       azithromycin 250 MG tablet    ZITHROMAX    6 tablet    Two tablets first day, then one tablet daily for four days.    Acute bronchitis, unspecified organism       budesonide-formoterol 80-4.5 MCG/ACT Inhaler    SYMBICORT    3 Inhaler    Inhale 2 puffs into the lungs 2 times daily    COPD exacerbation (H)       denosumab 60 MG/ML Soln injection    PROLIA    1 mL    Inject 1 mL (60 mg) Subcutaneous every 6 months    Senile osteoporosis, CKD (chronic kidney disease) stage 3, GFR 30-59 ml/min (H)       ferrous sulfate 325 (65 Fe) MG tablet    IRON      Take by mouth daily (with breakfast)        [facility] MOUTHWASH Susp     237 mL    Swish and swallow 5-10 mLs in mouth every 6 hours as needed    Thrush       fluconazole 150 MG tablet    DIFLUCAN    7 tablet    Take 1 tablet (150 mg) by mouth daily    Mouth sores       furosemide 20 MG tablet    LASIX    90 tablet    TAKE ONE TABLET BY MOUTH ONE TIME DAILY    Localized edema       gabapentin 300 MG capsule    NEURONTIN    90 capsule    Take 1 capsule (300 mg) by mouth daily as needed    Hip pain, left, Lumbar radicular pain       levothyroxine 75 MCG tablet    SYNTHROID/LEVOTHROID    90 tablet    Take 1 tablet (75 mcg) by mouth daily    Other specified hypothyroidism       metoprolol succinate 25 MG 24 hr tablet    TOPROL-XL    90 tablet    Take 1 tablet (25 mg) by mouth daily    ACS (acute coronary syndrome) (H)       MULTIVITAMIN GUMMIES ADULT PO           order for DME     1 each    Equipment being ordered: Nebulizer Fax order to [facility] [phone]    COPD exacerbation (H)       polyethylene glycol powder    MIRALAX    527 g    Take 17 g (1 capful) by mouth daily        traZODone 50 MG tablet    DESYREL    30 tablet    Take 1 tablet (50 mg) by mouth nightly as needed for sleep    Insomnia, unspecified type       * Notice:  This list has 2 medication(s) that are the same as other medications prescribed for you. Read the directions carefully, and ask your doctor or other care provider to review them with you.

## 2018-10-15 ENCOUNTER — TRANSFERRED RECORDS (OUTPATIENT)
Dept: HEALTH INFORMATION MANAGEMENT | Facility: CLINIC | Age: 83
End: 2018-10-15

## 2018-10-16 ENCOUNTER — TELEPHONE (OUTPATIENT)
Dept: INTERNAL MEDICINE | Facility: CLINIC | Age: 83
End: 2018-10-16

## 2018-10-16 NOTE — TELEPHONE ENCOUNTER
Reason for Call:  Form, our goal is to have forms completed with 72 hours, however, some forms may require a visit or additional information.    Type of letter, form or note: Revision to plan of care     Who is the form from?: Home care    Where did the form come from: form was faxed in    What clinic location was the form placed at?: [facility]    Where the form was placed: [facility]    What number is listed as a contact on the form?: Alma Delia Van, [phone]       Additional comments: Please sign, date and fax to [phone]    Call taken on 10/16/2018 at 11:29 AM by Lynne Mendoza

## 2018-10-17 DIAGNOSIS — I24.9 ACS (ACUTE CORONARY SYNDROME) (H): ICD-10-CM

## 2018-10-17 DIAGNOSIS — E78.5 HYPERLIPIDEMIA WITH TARGET LDL LESS THAN 130: ICD-10-CM

## 2018-10-17 NOTE — TELEPHONE ENCOUNTER
"Requested Prescriptions   Pending Prescriptions Disp Refills     atorvastatin (LIPITOR) 20 MG tablet [Pharmacy Med Name: Atorvastatin Calcium Oral Tablet 20 MG] 90 tablet 0    Last Written Prescription Date:  04/10/2018  Last Fill Quantity: 90,  # refills: 1   Last office visit: 9/19/2018 with prescribing provider:     Future Office Visit:   Sig: TAKE ONE TABLET BY MOUTH ONE TIME DAILY    Statins Protocol Passed    10/17/2018 10:29 AM       Passed - LDL on file in past 12 months    Recent Labs   Lab Test  03/12/18   1009   LDL  84            Passed - No abnormal creatine kinase in past 12 months    No lab results found.            Passed - Recent (12 mo) or future (30 days) visit within the authorizing provider's specialty    Patient had office visit in the last 12 months or has a visit in the next 30 days with authorizing provider or within the authorizing provider's specialty.  See \"Patient Info\" tab in inbasket, or \"Choose Columns\" in Meds & Orders section of the refill encounter.             Passed - Patient is age 18 or older       Passed - No active pregnancy on record       Passed - No positive pregnancy test in past 12 months        "

## 2018-10-18 ENCOUNTER — TELEPHONE (OUTPATIENT)
Dept: SURGERY | Facility: CLINIC | Age: 83
End: 2018-10-18

## 2018-10-18 ENCOUNTER — OFFICE VISIT (OUTPATIENT)
Dept: SURGERY | Facility: CLINIC | Age: 83
End: 2018-10-18
Payer: MEDICARE

## 2018-10-18 VITALS
HEIGHT: 59 IN | WEIGHT: 100 LBS | HEART RATE: 70 BPM | BODY MASS INDEX: 20.16 KG/M2 | OXYGEN SATURATION: 83 % | SYSTOLIC BLOOD PRESSURE: 100 MMHG | DIASTOLIC BLOOD PRESSURE: 62 MMHG | RESPIRATION RATE: 16 BRPM

## 2018-10-18 DIAGNOSIS — C91.10 CLL (CHRONIC LYMPHOCYTIC LEUKEMIA) (H): Primary | ICD-10-CM

## 2018-10-18 PROCEDURE — 99202 OFFICE O/P NEW SF 15 MIN: CPT | Performed by: SURGERY

## 2018-10-18 RX ORDER — ATORVASTATIN CALCIUM 20 MG/1
TABLET, FILM COATED ORAL
Qty: 90 TABLET | Refills: 1 | Status: SHIPPED | OUTPATIENT
Start: 2018-10-18 | End: 2019-05-24

## 2018-10-18 NOTE — LETTER
2018       CONSULTATION NOTE       Re:  Marie Kline,  1932       REASON FOR CONSULTATION: Chronic lymphocytic leukemia, adenopathy.       HISTORY OF PRESENT ILLNESS:  Ms. Kline is an 86-year-old female with a longstanding history of CLL (approximately 14 years) who recently began having some bloating and abdominal discomfort and was noted to have splenomegaly as well as hilar adenopathy and cervical adenopathy.  She is referred to our office today to discuss the possibility of surgical excisional biopsy of a right groin lymph node which has also been present on recent exam.  Apart from the above-mentioned symptoms, the patient has had no other constitutional concerns.       PAST MEDICAL AND SURGICAL HISTORY:  Reviewed from the chart.  She is not currently on any anticoagulants and last platelet count was 99,000.       PHYSICAL EXAMINATION:  On exam, Ms. Kline is well-appearing, in no acute distress.  She has some posterior cervical adenopathy, but the submandibular nodes are not readily palpable on my exam.  She does have palpable splenomegaly as well as adenopathy in the right groin with nodes measuring just over a cm or so roughly.  Her heart sounds are regular; breath sounds are without wheezes or crackles.       ASSESSMENT AND PLAN:  This is an 86-year-old female with a longstanding chronic lymphocytic leukemia history who now may be undergoing progression in transformation of her disease.  Excisional jared biopsy has been requested by her attending oncologist.  I think the inguinal adenopathy is the most readily accessible with the least risk to the patient, and we will, therefore, assist her in scheduling this biopsy early next week.  I described to she and her daughter why early in the day and early in the week is ideal in terms of getting an accurate tissue diagnosis.  We will, as above, aid her in scheduling while she is in the office with us today.             Reji Owens MD

## 2018-10-18 NOTE — MR AVS SNAPSHOT
"              After Visit Summary   10/18/2018    Marie Kline    MRN: [id number]           Patient Information     Date Of Birth          4/28/1932        Visit Information        Provider Department      10/18/2018 10:30 AM Reji Connors MD [facility] General Surgery      Today's Diagnoses     CLL (chronic lymphocytic leukemia) (HCC)    -  1       Follow-ups after your visit        Your next 10 appointments already scheduled     Oct 23, 2018 11:00 AM CDT   [facility] Same Day Surgery with Reji Connors MD, Laila Hernandez PA-C   [facility] Scheduling ([facility])    [facility] ([facility])   [phone]              Who to contact     If you have questions or need follow up information about today's clinic visit or your schedule please contact [facility] directly at [phone].  Normal or non-critical lab and imaging results will be communicated to you by MyChart, letter or phone within 4 business days after the clinic has received the results. If you do not hear from us within 7 days, please contact the clinic through MyChart or phone. If you have a critical or abnormal lab result, we will notify you by phone as soon as possible.  Submit refill requests through Andre Phillipe or call your pharmacy and they will forward the refill request to us. Please allow 3 business days for your refill to be completed.          Additional Information About Your Visit        Care EveryWhere ID     This is your Care EveryWhere ID. This could be used by other organizations to access your [facility] medical records  [id number]        Your Vitals Were     Pulse Respirations Height Pulse Oximetry BMI (Body Mass Index)       70 16 4' 11\" (1.499 m) 83% 20.2 kg/m2        Blood Pressure from Last 3 Encounters:   10/18/18 100/62   09/19/18 118/72   09/17/18 123/72    " Weight from Last 3 Encounters:   10/18/18 100 lb (45.4 kg)   10/11/18 100 lb (45.4 kg)   09/19/18 100 lb (45.4 kg)              Today, you had the following     No orders found for display         Today's Medication Changes          These changes are accurate as of 10/18/18 10:43 AM.  If you have any questions, ask your nurse or doctor.               These medicines have changed or have updated prescriptions.        Dose/Directions    traZODone 50 MG tablet   Commonly known as:  DESYREL   This may have changed:  how much to take   Used for:  Insomnia, unspecified type        Dose:  50 mg   Take 1 tablet (50 mg) by mouth nightly as needed for sleep   Quantity:  30 tablet   Refills:  0                Primary Care Provider Office Phone # Fax #    Piper Kiser [phone]       [address] [facility] [address]        Equal Access to Services     [facility]: Hadii juan leeo Soableardo, waaxda luqzulema, qaybta kaalmada adesuzie, zoe hyatt . So [facility] [phone].    ATENCIÓN: Si habla español, tiene a eaton disposición servicios gratuitos de asistencia lingüística. Llame al [phone].    We comply with applicable federal civil rights laws and [place] laws. We do not discriminate on the basis of race, color, national origin, age, disability, sex, sexual orientation, or gender identity.            Thank you!     Thank you for choosing [facility]  for your care. Our goal is always to provide you with excellent care. Hearing back from our patients is one way we can continue to improve our services. Please take a few minutes to complete the written survey that you may receive in the mail after your visit with us. Thank you!             Your Updated Medication List - Protect others around you: Learn how to safely use, store and throw away your medicines at www.disposemymeds.org.          This list is accurate as of 10/18/18 10:43 AM.   Always use your most recent med list.                   Brand Name Dispense Instructions for use Diagnosis    * albuterol (2.5 MG/3ML) 0.083% neb solution      Take 1 vial by nebulization 2 times daily        * albuterol 108 (90 Base) MCG/ACT inhaler    PROAIR HFA/PROVENTIL HFA/VENTOLIN HFA    1 Inhaler    Inhale 2 puffs into the lungs every 6 hours as needed for wheezing    Intermittent asthma without complication       atorvastatin 20 MG tablet    LIPITOR    90 tablet    Take 1 tablet (20 mg) by mouth daily    ACS (acute coronary syndrome) (H), Hyperlipidemia with target LDL less than 130       azithromycin 250 MG tablet    ZITHROMAX    6 tablet    Two tablets first day, then one tablet daily for four days.    Acute bronchitis, unspecified organism       budesonide-formoterol 80-4.5 MCG/ACT Inhaler    SYMBICORT    3 Inhaler    Inhale 2 puffs into the lungs 2 times daily    COPD exacerbation (H)       denosumab 60 MG/ML Soln injection    PROLIA    1 mL    Inject 1 mL (60 mg) Subcutaneous every 6 months    Senile osteoporosis, CKD (chronic kidney disease) stage 3, GFR 30-59 ml/min (H)       ferrous sulfate 325 (65 Fe) MG tablet    IRON     Take by mouth daily (with breakfast)        FIRST-HANS MOUTHWASH Susp     237 mL    Swish and swallow 5-10 mLs in mouth every 6 hours as needed    Thrush       fluconazole 150 MG tablet    DIFLUCAN    7 tablet    Take 1 tablet (150 mg) by mouth daily    Mouth sores       furosemide 20 MG tablet    LASIX    90 tablet    TAKE ONE TABLET BY MOUTH ONE TIME DAILY    Localized edema       gabapentin 300 MG capsule    NEURONTIN    90 capsule    Take 1 capsule (300 mg) by mouth daily as needed    Hip pain, left, Lumbar radicular pain       levothyroxine 75 MCG tablet    SYNTHROID/LEVOTHROID    90 tablet    Take 1 tablet (75 mcg) by mouth daily    Other specified hypothyroidism       metoprolol succinate 25 MG 24 hr tablet    TOPROL-XL    90 tablet    Take 1 tablet (25 mg) by mouth daily     ACS (acute coronary syndrome) (H)       MULTIVITAMIN GUMMIES ADULT PO           order for DME     1 each    Equipment being ordered: Nebulizer Fax order to [facility] [phone]    COPD exacerbation (H)       traZODone 50 MG tablet    DESYREL    30 tablet    Take 1 tablet (50 mg) by mouth nightly as needed for sleep    Insomnia, unspecified type       * Notice:  This list has 2 medication(s) that are the same as other medications prescribed for you. Read the directions carefully, and ask your doctor or other care provider to review them with you.

## 2018-10-18 NOTE — TELEPHONE ENCOUNTER
Type of surgery: EXCISIONAL BIOPSY RIGHT INGUINAL LYMPH NODE   Location of surgery: [facility]  Date and time of surgery: 10-23-18 AT 11:10 AM   Surgeon: DR. ESCOBAR   Pre-Op Appt Date: NA  Post-Op Appt Date: PATIENT TO SCHEDULE    Packet sent out: GIVEN TO PATIENT   Pre-cert/Authorization completed:  Yes  Date: 10-18-18         EXCISIONAL BIOPSY RIGHT INGUINAL LYMPH NODE   LOCAL   30 MINS REQ  PA ASSIST RMO if avail  ALW

## 2018-10-18 NOTE — TELEPHONE ENCOUNTER
Prescription approved per [facility] Refill Protocol.  Chloe Zheng RN

## 2018-10-18 NOTE — PROGRESS NOTES
Please insert dictated note here.    Please route or send letter to:  Referring Provider (Dr Isabel Ivory)

## 2018-10-19 NOTE — PROGRESS NOTES
Service Date: 10/18/2018      2018      CONSULTATION NOTE      Re:  Marie Kline,  1932      REASON FOR CONSULTATION: Chronic lymphocytic leukemia, adenopathy.      HISTORY OF PRESENT ILLNESS:  Ms. Kline is an 86-year-old female with a longstanding history of CLL (approximately 14 years) who recently began having some bloating and abdominal discomfort and was noted to have splenomegaly as well as hilar adenopathy and cervical adenopathy.  She is referred to our office today to discuss the possibility of surgical excisional biopsy of a right groin lymph node which has also been present on recent exam.  Apart from the above-mentioned symptoms, the patient has had no other constitutional concerns.      PAST MEDICAL AND SURGICAL HISTORY:  Reviewed from the chart.  She is not currently on any anticoagulants and last platelet count was 99,000.      PHYSICAL EXAMINATION:  On exam, Ms. Kline is well-appearing, in no acute distress.  She has some posterior cervical adenopathy, but the submandibular nodes are not readily palpable on my exam.  She does have palpable splenomegaly as well as adenopathy in the right groin with nodes measuring just over a cm or so roughly.  Her heart sounds are regular; breath sounds are without wheezes or crackles.      ASSESSMENT AND PLAN:  This is an 86-year-old female with a longstanding chronic lymphocytic leukemia history who now may be undergoing progression in transformation of her disease.  Excisional jared biopsy has been requested by her attending oncologist.  I think the inguinal adenopathy is the most readily accessible with the least risk to the patient, and we will, therefore, assist her in scheduling this biopsy early next week.  I described to she and her daughter why early in the day and early in the week is ideal in terms of getting an accurate tissue diagnosis.  We will, as above, aid her in scheduling while she is in the office with us today.           Reji  MD Roman      cc:  Piper Kiser MD   [facility] [address]  [place] [address]      cc:  Isabel Ivory MD   [facility],  [address],  [facility] [address]   [place] [address]         GODWIN VAZQUEZ MD             D: 10/18/2018   T: 10/18/2018   MT: ZAFAR      Name:     MAGGI RODRIGUEZ   MRN:      [id number]        Account:      [id number]   :      1932           Service Date: 10/18/2018      Document: [id number]

## 2018-10-23 ENCOUNTER — SURGERY (OUTPATIENT)
Age: 83
End: 2018-10-23

## 2018-10-23 ENCOUNTER — APPOINTMENT (OUTPATIENT)
Dept: SURGERY | Facility: PHYSICIAN GROUP | Age: 83
End: 2018-10-23
Payer: MEDICARE

## 2018-10-23 ENCOUNTER — HOSPITAL ENCOUNTER (OUTPATIENT)
Facility: CLINIC | Age: 83
Discharge: HOME | End: 2018-10-23
Attending: SURGERY | Admitting: SURGERY
Payer: MEDICARE

## 2018-10-23 VITALS
HEART RATE: 81 BPM | DIASTOLIC BLOOD PRESSURE: 71 MMHG | WEIGHT: 103 LBS | HEIGHT: 59 IN | SYSTOLIC BLOOD PRESSURE: 118 MMHG | TEMPERATURE: 98.3 F | BODY MASS INDEX: 20.76 KG/M2 | OXYGEN SATURATION: 98 % | RESPIRATION RATE: 20 BRPM

## 2018-10-23 PROCEDURE — 38500 BIOPSY/REMOVAL LYMPH NODES: CPT | Performed by: SURGERY

## 2018-10-23 PROCEDURE — 00000159 ZZHCL STATISTIC H-SEND OUTS PREP: Performed by: SURGERY

## 2018-10-23 PROCEDURE — 25000125 ZZHC RX 250: Performed by: SURGERY

## 2018-10-23 PROCEDURE — 88275 CYTOGENETICS 100-300: CPT | Performed by: INTERNAL MEDICINE

## 2018-10-23 PROCEDURE — 27210794 ZZH OR GENERAL SUPPLY STERILE: Performed by: SURGERY

## 2018-10-23 PROCEDURE — 88305 TISSUE EXAM BY PATHOLOGIST: CPT | Performed by: SURGERY

## 2018-10-23 PROCEDURE — 88341 IMHCHEM/IMCYTCHM EA ADD ANTB: CPT | Performed by: SURGERY

## 2018-10-23 PROCEDURE — 40000306 ZZH STATISTIC PRE PROC ASSESS II: Performed by: SURGERY

## 2018-10-23 PROCEDURE — 40001005 ZZHCL STATISTIC FLOW >15 ABY TC 88189: Performed by: INTERNAL MEDICINE

## 2018-10-23 PROCEDURE — 88342 IMHCHEM/IMCYTCHM 1ST ANTB: CPT | Mod: 26 | Performed by: SURGERY

## 2018-10-23 PROCEDURE — 88342 IMHCHEM/IMCYTCHM 1ST ANTB: CPT | Mod: XU | Performed by: SURGERY

## 2018-10-23 PROCEDURE — 88341 IMHCHEM/IMCYTCHM EA ADD ANTB: CPT | Mod: 26 | Performed by: SURGERY

## 2018-10-23 PROCEDURE — 88271 CYTOGENETICS DNA PROBE: CPT | Performed by: INTERNAL MEDICINE

## 2018-10-23 PROCEDURE — 71000027 ZZH RECOVERY PHASE 2 EACH 15 MINS: Performed by: SURGERY

## 2018-10-23 PROCEDURE — 88185 FLOWCYTOMETRY/TC ADD-ON: CPT | Performed by: INTERNAL MEDICINE

## 2018-10-23 PROCEDURE — 88184 FLOWCYTOMETRY/ TC 1 MARKER: CPT | Performed by: INTERNAL MEDICINE

## 2018-10-23 PROCEDURE — 36000050 ZZH SURGERY LEVEL 2 1ST 30 MIN: Performed by: SURGERY

## 2018-10-23 PROCEDURE — 88305 TISSUE EXAM BY PATHOLOGIST: CPT | Mod: 26 | Performed by: SURGERY

## 2018-10-23 RX ADMIN — Medication 18 ML: at 11:46

## 2018-10-23 NOTE — IP AVS SNAPSHOT
MRN:[id number]                      After Visit Summary   10/23/2018    Marie Kline    MRN: [id number]           Thank you!     Thank you for choosing [facility] for your care. Our goal is always to provide you with excellent care. Hearing back from our patients is one way we can continue to improve our services. Please take a few minutes to complete the written survey that you may receive in the mail after you visit. If you would like to speak to someone directly about your visit please contact Patient Relations at [phone]. Thank you!          Patient Information     Date Of Birth          4/28/1932        About your hospital stay     You were admitted on:  October 23, 2018 You last received care in the:  [facility] OR    You were discharged on:  October 23, 2018       Who to Call     For medical emergencies, please call 911.  For non-urgent questions about your medical care, please call your primary care provider or clinic, [phone]  For questions related to your surgery, please call your surgery clinic        Attending Provider     Provider Specialty    Reji Connors MD General Surgery       Primary Care Provider Office Phone # Fax #    Piper Catracho Kiser [phone]      Further instructions from your care team       HOME CARE FOLLOWING MINOR SURGERY  ABBY Leach, MOISE Stahl R. O Donnell, J. Shaheen    RESULTS:  Initial results should return within two days of your surgery. Given the nature of your disease, please follow up with Dr Ivory to discuss the details.    INCISIONAL CARE:    If you have a dressing in place, keep clean and dry for 48 hours; you may replace the gauze if it becomes soiled.    After 48 hours you may remove the dressing and shower.  Do not submerse incision in water for 1 week.    If you have a Dermabond dressing (a type of skin glue), you may shower immediately.    Sutures which are  beneath the skin will absorb and do not need to be removed.    Sutures you can see should be removed at your surgeon's office in 10-14 days at the latest.    If present, leave the steri-strips (white paper tapes) in place for 14 days after surgery.    If present, leave Dermabond glue in place until it wears/flakes off.    You may expect a small amount of drainage from your incision.    A lump/ridge under the incision is normal and will gradually resolve.  If it becomes red or very uncomfortable, contact the nurse at your surgeon's office to discuss whether this needs to be evaluated.    ACTIVITY:  Cautiously resume exercise and strenuous activities such as jogging, tennis, aerobics, etc. Also, be careful of stretching activities which affect the area of surgery for two weeks.    DIET:  No restrictions.  Increased fluid intake is recommended. While taking pain medications, increase dietary fiber or add a fiber supplementation like Metamucil or Citrucel to help prevent constipation - a possible side effect of pain medications.    DISCOMFORT:  Local anesthetic placed at surgery should provide relief for 4-8 hours.  Begin taking pain pills before discomfort is severe.  Take the pain medication with some food, when possible, to minimize side effects.  Intermittent use of ice packs may help during the first 48 hours.  Expect gradual improvement.  You may slowly transition to use of over-the-counter medications for pain relief when you are no longer requiring narcotics for pain control.    RETURN APPOINTMENT:  No specific need for a follow up appointment. Please call with any questions or concerns in regards to your wound.  Office Phone:  [phone]     CONTACT US IF THE FOLLOWING DEVELOPS:   1. A fever that is above 101     2. If there is a large amount of drainage, bleeding, or swelling.   3. Severe pain that is not relieved by your prescription.   4. Drainage that is thick, cloudy, yellow, green or white.   5. Any  other questions not answered by  Frequently Asked Questions  sheet.      FREQUENTLY ASKED QUESTIONS:    Q:  How should my incision look?    A:  Normally your incision will appear slightly swollen with light redness directly along the incision itself as it heals.  It may feel like a bump or ridge as the healing/scarring happens, and over time (3-4 months) this bump or ridge feeling should slowly go away.  In general, clear or pink watery drainage can be normal at first as your incision heals, but should decrease over time.    Q:  How do I know if my incision is infected?  A:  Look at your incision for signs of infection, like redness around the incision spreading to surrounding skin, or drainage of cloudy or foul-smelling drainage.  If you feel warm, check your temperature to see if you are running a fever.    **If any of these things occur, please notify the nurse at our office.  We may need you to come into the office for an incision check.      Q:  How do I take care of my incision?  A:  If you have a dressing in place - Starting the day after surgery, replace the dressing 1-2 times a day until there is no further drainage from the incision.  At that time, a dressing is no longer needed.  Try to minimize tape on the skin if irritation is occurring at the tape sites.  If you have significant irritation from tape on the skin, please call the office to discuss other method of dressing your incision.    Small pieces of tape called  steri-strips  may be present directly overlying your incision; these may be removed 10 days after surgery unless otherwise specified by your surgeon.  If these tapes start to loosen at the ends, you may trim them back until they fall off or are removed.    A:  If you had  Dermabond  tissue glue used as a dressing (this causes your incision to look shiny with a clear covering over it) - This type of dressing wears off with time and does not require more dressings over the top unless it is  draining around the glue as it wears off.  Do not apply ointments or lotions over the incisions until the glue has completely worn off.    Q:  There is a piece of tape or a sticky  lead  still on my skin.  Can I remove this?  A:  Sometimes the sticky  leads  used for monitoring during surgery or for evaluation in the emergency department are not all removed while you are in the hospital.  These sometimes have a tab or metal dot on them.  You can easily remove these on your own, like taking off a band-aid.  If there is a gel substance under the  lead , simply wipe/clean it off with a washcloth or paper towel.      Q:  What can I do to minimize constipation (very hard stools, or lack of stools)?  A:  Stay well hydrated.  Increase your dietary fiber intake or take a fiber supplement -with plenty of water.  Walk around frequently.  You may consider an over-the-counter stool-softener.  Your Pharmacist can assist you with choosing one that is stocked at your pharmacy.  Constipation is also one of the most common side effects of pain medication.  If you are using pain medication, be pro-active and try to PREVENT problems with constipation by taking the steps above BEFORE constipation becomes a problem.    Q:  What do I do if I need more pain medications?  A:  Call the office to receive refills.  Be aware that certain pain meds cannot be called into a pharmacy and actually require a paper prescription.  A change may be made in your pain med as you progress thru your recovery period or if you have side effects to certain meds.    --Pain meds are NOT refilled after 5pm on weekdays, and NOT AT ALL on the weekends, so please look ahead to prevent problems.      Q:  Why am I having a hard time sleeping now that I am at home?  A:  Many medications you receive while you are in the hospital can impact your sleep for a number of days after your surgery/hospitalization.  Decreased level of activity and naps during the day may also  make sleeping at night difficult.  Try to minimize day-time naps, and get up frequently during the day to walk around your home during your recovery time.  Sleep aides may be of some help, but are not recommended for long-term use.      Q:  I am having some back discomfort.  What should I do?  A:  This may be related to certain positioning that was required for your surgery, extended periods of time in bed, or other changes in your overall activity level.  You may try ice, heat, acetaminophen, or ibuprofen to treat this temporarily.  Note that many pain medications have acetaminophen in them and would state this on the prescription bottle.  Be sure not to exceed the maximum of 4000mg per day of acetaminophen.     **If the pain you are having does not resolve, is severe, or is a flare of back pain you have had on other occasions prior to surgery, please contact your primary physician for further recommendations or for an appointment to be examined at their office.    Q:  Why am I having headaches?  A:  Headaches can be caused by many things:  caffeine withdrawal, use of pain meds, dehydration, high blood pressure, lack of sleep, over-activity/exhaustion, flare-up of usual migraine headaches.  If you feel this is related to muscle tension (a band-like feeling around the head, or a pressure at the low-back of the head) you may try ice or heat to this area.  You may need to drink more fluids (try electrolyte drink like Gatorade), rest, or take your usual migraine medications.   **If your headaches do not resolve, worsen, are accompanied by other symptoms, or if your blood pressure is high, please call your primary physician for recommendation and/or examination.    Q:  I am unable to urinate.  What do I do?  A:  A small percentage of people can have difficulty urinating initially after surgery.  This includes being able to urinate only a very small amount at a time and feeling discomfort or pressure in the very low  "abdomen.  This is called  urinary retention , and is actually an urgent situation.  Proceed to your nearest Emergency department for evaluation (not an Urgent Care Center).  Sometimes the bladder does not work correctly after certain medications you receive during surgery, or related to certain procedures.  You may need to have a catheter placed until your bladder recovers.  When planning to go to an Emergency department, it may help to call the ER to let them know you are coming in for this problem after a surgery.  This may help you get in quicker to be evaluated.  **If you have symptoms of a urinary tract infection, please contact your primary physician for the proper evaluation and treatment.          If you have other questions, please call the office Monday thru Friday between 8am and 5pm to discuss with the nurse or physician assistant.  #[phone]    There is a surgeon ON CALL on weekday evenings and over the weekend in case of urgent need only, and may be contacted at the same number.    If you are having an emergency, call 911 or proceed to your nearest emergency department.            Pending Results     No orders found from 10/21/2018 to 10/24/2018.            Admission Information     Date & Time Provider Department Dept. Phone    10/23/2018 Reji Connors MD [facility] [phone]      Your Vitals Were     Blood Pressure Pulse Temperature Respirations Height Weight    133/82 81 98.2  F (36.8  C) (Temporal) 16 1.499 m (4' 11\") 46.7 kg (103 lb)    Pulse Oximetry BMI (Body Mass Index)                91% 20.8 kg/m2          Care EveryWhere ID     This is your Care EveryWhere ID. This could be used by other organizations to access your [facility] medical records  [id number]        Equal Access to Services     [facility] ITZEL : Claudia Cast, ladonna clayton, zoe dela cruz. So [facility] [phone].    ATENCIÓN: Si " bobo starkey, tiene a eaton disposición servicios gratuitos de asistencia lingüística. Carol cuellar [phone].    We comply with applicable federal civil rights laws and [place] laws. We do not discriminate on the basis of race, color, national origin, age, disability, sex, sexual orientation, or gender identity.               Review of your medicines      CONTINUE these medicines which may have CHANGED, or have new prescriptions. If we are uncertain of the size of tablets/capsules you have at home, strength may be listed as something that might have changed.        Dose / Directions    gabapentin 300 MG capsule   Commonly known as:  NEURONTIN   This may have changed:  when to take this   Used for:  Hip pain, left, Lumbar radicular pain        Dose:  300 mg   Take 1 capsule (300 mg) by mouth daily as needed   Quantity:  90 capsule   Refills:  3       traZODone 50 MG tablet   Commonly known as:  DESYREL   This may have changed:  how much to take   Used for:  Insomnia, unspecified type        Dose:  50 mg   Take 1 tablet (50 mg) by mouth nightly as needed for sleep   Quantity:  30 tablet   Refills:  0         CONTINUE these medicines which have NOT CHANGED        Dose / Directions    albuterol 108 (90 Base) MCG/ACT inhaler   Commonly known as:  PROAIR HFA/PROVENTIL HFA/VENTOLIN HFA   Used for:  Intermittent asthma without complication        Dose:  2 puff   Inhale 2 puffs into the lungs every 6 hours as needed for wheezing   Quantity:  1 Inhaler   Refills:  8       atorvastatin 20 MG tablet   Commonly known as:  LIPITOR   Used for:  ACS (acute coronary syndrome) (H), Hyperlipidemia with target LDL less than 130        TAKE ONE TABLET BY MOUTH ONE TIME DAILY   Quantity:  90 tablet   Refills:  1       budesonide-formoterol 80-4.5 MCG/ACT Inhaler   Commonly known as:  SYMBICORT   Used for:  COPD exacerbation (H)        Dose:  2 puff   Inhale 2 puffs into the lungs 2 times daily   Quantity:  3 Inhaler   Refills:  1        denosumab 60 MG/ML Soln injection   Commonly known as:  PROLIA   Used for:  Senile osteoporosis, CKD (chronic kidney disease) stage 3, GFR 30-59 ml/min (H)        Dose:  60 mg   Inject 1 mL (60 mg) Subcutaneous every 6 months   Quantity:  1 mL   Refills:  0       ferrous sulfate 325 (65 Fe) MG tablet   Commonly known as:  IRON        Take by mouth daily (with breakfast)   Refills:  0       furosemide 20 MG tablet   Commonly known as:  LASIX   Used for:  Localized edema        TAKE ONE TABLET BY MOUTH ONE TIME DAILY   Quantity:  90 tablet   Refills:  1       levothyroxine 75 MCG tablet   Commonly known as:  SYNTHROID/LEVOTHROID   Used for:  Other specified hypothyroidism        Dose:  75 mcg   Take 1 tablet (75 mcg) by mouth daily   Quantity:  90 tablet   Refills:  2       metoprolol succinate 25 MG 24 hr tablet   Commonly known as:  TOPROL-XL   Used for:  ACS (acute coronary syndrome) (H)        Dose:  25 mg   Take 1 tablet (25 mg) by mouth daily   Quantity:  90 tablet   Refills:  3       MULTIVITAMIN GUMMIES ADULT PO        Refills:  0       order for DME   Used for:  COPD exacerbation (H)        Equipment being ordered: Nebulizer Fax order to [facility] [phone]   Quantity:  1 each   Refills:  0                Protect others around you: Learn how to safely use, store and throw away your medicines at www.disposemymeds.org.             Medication List: This is a list of all your medications and when to take them. Check marks below indicate your daily home schedule. Keep this list as a reference.      Medications           Morning Afternoon Evening Bedtime As Needed    albuterol 108 (90 Base) MCG/ACT inhaler   Commonly known as:  PROAIR HFA/PROVENTIL HFA/VENTOLIN HFA   Inhale 2 puffs into the lungs every 6 hours as needed for wheezing                                atorvastatin 20 MG tablet   Commonly known as:  LIPITOR   TAKE ONE TABLET BY MOUTH ONE TIME DAILY                                 budesonide-formoterol 80-4.5 MCG/ACT Inhaler   Commonly known as:  SYMBICORT   Inhale 2 puffs into the lungs 2 times daily                                denosumab 60 MG/ML Soln injection   Commonly known as:  PROLIA   Inject 1 mL (60 mg) Subcutaneous every 6 months                                ferrous sulfate 325 (65 Fe) MG tablet   Commonly known as:  IRON   Take by mouth daily (with breakfast)                                furosemide 20 MG tablet   Commonly known as:  LASIX   TAKE ONE TABLET BY MOUTH ONE TIME DAILY                                gabapentin 300 MG capsule   Commonly known as:  NEURONTIN   Take 1 capsule (300 mg) by mouth daily as needed                                levothyroxine 75 MCG tablet   Commonly known as:  SYNTHROID/LEVOTHROID   Take 1 tablet (75 mcg) by mouth daily                                metoprolol succinate 25 MG 24 hr tablet   Commonly known as:  TOPROL-XL   Take 1 tablet (25 mg) by mouth daily                                MULTIVITAMIN GUMMIES ADULT PO                                order for DME   Equipment being ordered: Nebulizer Fax order to [facility] [phone]                                traZODone 50 MG tablet   Commonly known as:  DESYREL   Take 1 tablet (50 mg) by mouth nightly as needed for sleep

## 2018-10-23 NOTE — BRIEF OP NOTE
[facility] Brief Operative Note    Pre-operative diagnosis: CLL   Post-operative diagnosis CLL   Procedure: Procedure(s):  excsional biopsy right inguinal lymph node   Surgeon(s): Surgeon(s) and Role:     * Reji Connors MD - Primary     * Laila Hernandez PA-C   Estimated blood loss: * No values recorded between 10/23/2018 11:32 AM and 10/23/2018 11:46 AM *    Specimens:   ID Type Source Tests Collected by Time Destination   A : Right inguinal lyphnodes x 2 for lymphoma protocol Tissue Lymph Node SURGICAL PATHOLOGY EXAM, LEUKEMIA LYMPHOMA EVALUATION (FLOW CYTOMETRY) Reji Connors MD 10/23/2018 11:39 AM       Findings: Two right inguinal lymph nodes excised, measuring 1-1.5cm in maximum diameter, slightly firm and fleshy. Submitted fresh for lymphoma protocol.

## 2018-10-23 NOTE — DISCHARGE INSTRUCTIONS
HOME CARE FOLLOWING MINOR SURGERY  ABBY Leach, MOISE Stahl R. O Donnell, J. Shaheen    RESULTS:  Initial results should return within two days of your surgery. Given the nature of your disease, please follow up with Dr Ivory to discuss the details.    INCISIONAL CARE:    If you have a dressing in place, keep clean and dry for 48 hours; you may replace the gauze if it becomes soiled.    After 48 hours you may remove the dressing and shower.  Do not submerse incision in water for 1 week.    If you have a Dermabond dressing (a type of skin glue), you may shower immediately.    Sutures which are beneath the skin will absorb and do not need to be removed.    Sutures you can see should be removed at your surgeon's office in 10-14 days at the latest.    If present, leave the steri-strips (white paper tapes) in place for 14 days after surgery.    If present, leave Dermabond glue in place until it wears/flakes off.    You may expect a small amount of drainage from your incision.    A lump/ridge under the incision is normal and will gradually resolve.  If it becomes red or very uncomfortable, contact the nurse at your surgeon's office to discuss whether this needs to be evaluated.    ACTIVITY:  Cautiously resume exercise and strenuous activities such as jogging, tennis, aerobics, etc. Also, be careful of stretching activities which affect the area of surgery for two weeks.    DIET:  No restrictions.  Increased fluid intake is recommended. While taking pain medications, increase dietary fiber or add a fiber supplementation like Metamucil or Citrucel to help prevent constipation - a possible side effect of pain medications.    DISCOMFORT:  Local anesthetic placed at surgery should provide relief for 4-8 hours.  Begin taking pain pills before discomfort is severe.  Take the pain medication with some food, when possible, to minimize side effects.  Intermittent use of ice packs may help during the first  48 hours.  Expect gradual improvement.  You may slowly transition to use of over-the-counter medications for pain relief when you are no longer requiring narcotics for pain control.    RETURN APPOINTMENT:  No specific need for a follow up appointment. Please call with any questions or concerns in regards to your wound.  Office Phone:  [phone]     CONTACT US IF THE FOLLOWING DEVELOPS:   1. A fever that is above 101     2. If there is a large amount of drainage, bleeding, or swelling.   3. Severe pain that is not relieved by your prescription.   4. Drainage that is thick, cloudy, yellow, green or white.   5. Any other questions not answered by  Frequently Asked Questions  sheet.      FREQUENTLY ASKED QUESTIONS:    Q:  How should my incision look?    A:  Normally your incision will appear slightly swollen with light redness directly along the incision itself as it heals.  It may feel like a bump or ridge as the healing/scarring happens, and over time (3-4 months) this bump or ridge feeling should slowly go away.  In general, clear or pink watery drainage can be normal at first as your incision heals, but should decrease over time.    Q:  How do I know if my incision is infected?  A:  Look at your incision for signs of infection, like redness around the incision spreading to surrounding skin, or drainage of cloudy or foul-smelling drainage.  If you feel warm, check your temperature to see if you are running a fever.    **If any of these things occur, please notify the nurse at our office.  We may need you to come into the office for an incision check.      Q:  How do I take care of my incision?  A:  If you have a dressing in place - Starting the day after surgery, replace the dressing 1-2 times a day until there is no further drainage from the incision.  At that time, a dressing is no longer needed.  Try to minimize tape on the skin if irritation is occurring at the tape sites.  If you have significant irritation  from tape on the skin, please call the office to discuss other method of dressing your incision.    Small pieces of tape called  steri-strips  may be present directly overlying your incision; these may be removed 10 days after surgery unless otherwise specified by your surgeon.  If these tapes start to loosen at the ends, you may trim them back until they fall off or are removed.    A:  If you had  Dermabond  tissue glue used as a dressing (this causes your incision to look shiny with a clear covering over it) - This type of dressing wears off with time and does not require more dressings over the top unless it is draining around the glue as it wears off.  Do not apply ointments or lotions over the incisions until the glue has completely worn off.    Q:  There is a piece of tape or a sticky  lead  still on my skin.  Can I remove this?  A:  Sometimes the sticky  leads  used for monitoring during surgery or for evaluation in the emergency department are not all removed while you are in the hospital.  These sometimes have a tab or metal dot on them.  You can easily remove these on your own, like taking off a band-aid.  If there is a gel substance under the  lead , simply wipe/clean it off with a washcloth or paper towel.      Q:  What can I do to minimize constipation (very hard stools, or lack of stools)?  A:  Stay well hydrated.  Increase your dietary fiber intake or take a fiber supplement -with plenty of water.  Walk around frequently.  You may consider an over-the-counter stool-softener.  Your Pharmacist can assist you with choosing one that is stocked at your pharmacy.  Constipation is also one of the most common side effects of pain medication.  If you are using pain medication, be pro-active and try to PREVENT problems with constipation by taking the steps above BEFORE constipation becomes a problem.    Q:  What do I do if I need more pain medications?  A:  Call the office to receive refills.  Be aware that  certain pain meds cannot be called into a pharmacy and actually require a paper prescription.  A change may be made in your pain med as you progress thru your recovery period or if you have side effects to certain meds.    --Pain meds are NOT refilled after 5pm on weekdays, and NOT AT ALL on the weekends, so please look ahead to prevent problems.      Q:  Why am I having a hard time sleeping now that I am at home?  A:  Many medications you receive while you are in the hospital can impact your sleep for a number of days after your surgery/hospitalization.  Decreased level of activity and naps during the day may also make sleeping at night difficult.  Try to minimize day-time naps, and get up frequently during the day to walk around your home during your recovery time.  Sleep aides may be of some help, but are not recommended for long-term use.      Q:  I am having some back discomfort.  What should I do?  A:  This may be related to certain positioning that was required for your surgery, extended periods of time in bed, or other changes in your overall activity level.  You may try ice, heat, acetaminophen, or ibuprofen to treat this temporarily.  Note that many pain medications have acetaminophen in them and would state this on the prescription bottle.  Be sure not to exceed the maximum of 4000mg per day of acetaminophen.     **If the pain you are having does not resolve, is severe, or is a flare of back pain you have had on other occasions prior to surgery, please contact your primary physician for further recommendations or for an appointment to be examined at their office.    Q:  Why am I having headaches?  A:  Headaches can be caused by many things:  caffeine withdrawal, use of pain meds, dehydration, high blood pressure, lack of sleep, over-activity/exhaustion, flare-up of usual migraine headaches.  If you feel this is related to muscle tension (a band-like feeling around the head, or a pressure at the low-back  of the head) you may try ice or heat to this area.  You may need to drink more fluids (try electrolyte drink like Gatorade), rest, or take your usual migraine medications.   **If your headaches do not resolve, worsen, are accompanied by other symptoms, or if your blood pressure is high, please call your primary physician for recommendation and/or examination.    Q:  I am unable to urinate.  What do I do?  A:  A small percentage of people can have difficulty urinating initially after surgery.  This includes being able to urinate only a very small amount at a time and feeling discomfort or pressure in the very low abdomen.  This is called  urinary retention , and is actually an urgent situation.  Proceed to your nearest Emergency department for evaluation (not an Urgent Care Center).  Sometimes the bladder does not work correctly after certain medications you receive during surgery, or related to certain procedures.  You may need to have a catheter placed until your bladder recovers.  When planning to go to an Emergency department, it may help to call the ER to let them know you are coming in for this problem after a surgery.  This may help you get in quicker to be evaluated.  **If you have symptoms of a urinary tract infection, please contact your primary physician for the proper evaluation and treatment.          If you have other questions, please call the office Monday thru Friday between 8am and 5pm to discuss with the nurse or physician assistant.  #[phone]    There is a surgeon ON CALL on weekday evenings and over the weekend in case of urgent need only, and may be contacted at the same number.    If you are having an emergency, call 911 or proceed to your nearest emergency department.

## 2018-10-23 NOTE — PROGRESS NOTES
"SPIRITUAL HEALTH SERVICES Progress Note  [facility] Pre-op    SH consult per pt's request for chaplaincy support prior to surgery today.   Met with pt, Marie, and her dtr, Margaret.   Pt shares that \"I'm in the hands of the Lord, He has gotten me through so much already!\" Marie expresses confidence and hope as she prepares for surgery.   Marie invited prayer and we shared in prayer and Scripture reading together. Afterwards she expressed gratitude and shared, \"That was a good one!\"  No other needs expressed at this time.     NATIVIDAD Day.  Staff    Pager #[phone]     "

## 2018-10-23 NOTE — IP AVS SNAPSHOT
[facility] OR    [address]    [facility] [address]    Phone:  [phone]                                       After Visit Summary   10/23/2018    Marie Kline    MRN: [id number]           After Visit Summary Signature Page     I have received my discharge instructions, and my questions have been answered. I have discussed any challenges I see with this plan with the nurse or doctor.    ..........................................................................................................................................  Patient/Patient Representative Signature      ..........................................................................................................................................  Patient Representative Print Name and Relationship to Patient    ..................................................               ................................................  Date                                   Time    ..........................................................................................................................................  Reviewed by Signature/Title    ...................................................              ..............................................  Date                                               Time          22EPIC Rev 08/18

## 2018-10-23 NOTE — OP NOTE
Procedure Date: 10/23/2018      PREOPERATIVE DIAGNOSIS:  Chronic lymphocytic leukemia, lymphadenopathy.      POSTOPERATIVE DIAGNOSIS:  Chronic lymphocytic leukemia, lymphadenopathy.      PROCEDURE:  Right inguinal excisional lymph node biopsy.      ANESTHESIA:  Local only.      SURGEON:  Reji De Paz MD      ASSISTANT:  Laila Hernandez PA-C      SPECIMENS:  Right inguinal lymph nodes handled fresh for lymphoma protocol (2 separate nodes excised).      COMPLICATIONS:  None.      INDICATIONS:  Ms. Kline is an 86-year-old female who was referred to my office earlier this month with lymphadenopathy in the context of longstanding history of CLL.  She has had some recent abdominal and systemic complaints and was noted to have inguinal adenopathy in addition to mediastinal and cervical adenopathy.  Her oncologist desired excisional biopsy of the nodes to see whether this is a progression or transformation of her disease.  Risks of procedure including infection, bleeding, harm to adjacent structures as well as nondiagnostic studies were reviewed.  The patient verbalized understanding of the above and consented to proceed.      FINDINGS:  We excised 2 mildly enlarged lymph nodes from the superficial right inguinal region, these measured between 1 and 1.5 cm, somewhat firm and fleshy.  These were submitted fresh for lymphoma protocol.      DESCRIPTION OF PROCEDURE:  With the patient in a comfortable semi-recumbent position on the operative table, the right groin was clippered and prepped and draped out in the usual sterile surgical fashion.  Timeout was performed confirming the patient, procedure to be done as well as site markings and drug allergies.  She required no antibiotics for this clean case.  We began by palpating the adenopathy in the right groin and marking an approximately 2.5 cm oblique line over this.  The subcutaneous tissues and surrounding regional block was created with a mixture of 1% lidocaine and 0.5%  Marcaine buffered.  Anesthetic effect was tested.  The marked was subsequently incised sharply with a #15 blade.  Electrocautery dissection was carried out through the subcutaneous fat until we encountered the palpable lymph nodes.  A small cerebellar self-retaining retractor was placed into the wound.        The more superficial lymph node was elevated into view with an Allis and circumferentially dissected free from the surrounding tissues with electrocautery.  This first node was measured and found to be 1.5 cm in maximum diameter and somewhat firm.  A similar node just deep to this was also excised in a similar fashion, maximum diameter of this node was approximately 1 cm.  We then irrigated the wound, cauterized to good hemostatic effect and closed the skin in 2 layers with 3-0 and 4-0 Vicryls.  Skin glue was then applied atop the closed wound.  The patient tolerated the procedure well and was brought to preop in excellent condition.  All sharps and sponge counts were correct at the conclusion of the case.         GODWIN VAZQUEZ MD             D: 10/23/2018   T: 10/23/2018   MT: NTS      Name:     MAGGI RODRIGUEZ   MRN:      [id number]        Account:        [id number]   :      1932           Procedure Date: 10/23/2018      Document: [id number]       cc: Isabel Kiser MD

## 2018-10-25 LAB — COPATH REPORT: NORMAL

## 2018-10-26 ENCOUNTER — MEDICAL CORRESPONDENCE (OUTPATIENT)
Dept: HEALTH INFORMATION MANAGEMENT | Facility: CLINIC | Age: 83
End: 2018-10-26

## 2018-10-26 ENCOUNTER — TELEPHONE (OUTPATIENT)
Dept: INTERNAL MEDICINE | Facility: CLINIC | Age: 83
End: 2018-10-26

## 2018-11-01 DIAGNOSIS — E03.8 OTHER SPECIFIED HYPOTHYROIDISM: ICD-10-CM

## 2018-11-01 NOTE — TELEPHONE ENCOUNTER
"Requested Prescriptions   Pending Prescriptions Disp Refills     levothyroxine (SYNTHROID/LEVOTHROID) 75 MCG tablet [Pharmacy Med Name: Levothyroxine Sodium Oral Tablet 75 MCG]  Last Written Prescription Date:  10/24/2017  Last Fill Quantity: 90,  # refills: 2   Last office visit: 9/19/2018 with prescribing provider:     Future Office Visit:   90 tablet 1     Sig: TAKE ONE TABLET BY MOUTH ONE TIME DAILY    Thyroid Protocol Failed    11/1/2018  3:58 PM       Failed - Normal TSH on file in past 12 months    Recent Labs   Lab Test  03/12/18   1009   TSH  7.50*             Passed - Patient is 12 years or older       Passed - Recent (12 mo) or future (30 days) visit within the authorizing provider's specialty    Patient had office visit in the last 12 months or has a visit in the next 30 days with authorizing provider or within the authorizing provider's specialty.  See \"Patient Info\" tab in inbasket, or \"Choose Columns\" in Meds & Orders section of the refill encounter.             Passed - No active pregnancy on record    If patient is pregnant or has had a positive pregnancy test, please check TSH.         Passed - No positive pregnancy test in past 12 months    If patient is pregnant or has had a positive pregnancy test, please check TSH.          "

## 2018-11-06 ENCOUNTER — TRANSFERRED RECORDS (OUTPATIENT)
Dept: HEALTH INFORMATION MANAGEMENT | Facility: CLINIC | Age: 83
End: 2018-11-06

## 2018-11-06 RX ORDER — LEVOTHYROXINE SODIUM 75 UG/1
TABLET ORAL
Qty: 90 TABLET | Refills: 1 | Status: SHIPPED | OUTPATIENT
Start: 2018-11-06 | End: 2019-05-24

## 2018-11-06 NOTE — TELEPHONE ENCOUNTER
Routing refill request to provider for review/approval because:  Labs out of range:  TSH  Anita IVEY RN

## 2018-11-09 LAB — COPATH REPORT: NORMAL

## 2018-11-13 LAB — COPATH REPORT: NORMAL

## 2018-11-18 ENCOUNTER — APPOINTMENT (OUTPATIENT)
Dept: GENERAL RADIOLOGY | Facility: CLINIC | Age: 83
DRG: 470 | End: 2018-11-18
Attending: EMERGENCY MEDICINE
Payer: MEDICARE

## 2018-11-18 ENCOUNTER — HOSPITAL ENCOUNTER (INPATIENT)
Facility: CLINIC | Age: 83
LOS: 4 days | Discharge: NURSING, REHAB OR LONG-TERM CARE | DRG: 470 | End: 2018-11-22
Attending: EMERGENCY MEDICINE | Admitting: HOSPITALIST
Payer: MEDICARE

## 2018-11-18 DIAGNOSIS — S72.002A FRACTURE OF HIP, LEFT, CLOSED, INITIAL ENCOUNTER (H): ICD-10-CM

## 2018-11-18 DIAGNOSIS — S81.819A SKIN TEAR OF LOWER LEG WITHOUT COMPLICATION, UNSPECIFIED LATERALITY, INITIAL ENCOUNTER: ICD-10-CM

## 2018-11-18 LAB
ABO + RH BLD: NORMAL
ABO + RH BLD: NORMAL
ANION GAP SERPL CALCULATED.3IONS-SCNC: 4 MMOL/L (ref 3–14)
ANISOCYTOSIS BLD QL SMEAR: SLIGHT
BASOPHILS # BLD AUTO: 0 10E9/L (ref 0–0.2)
BASOPHILS NFR BLD AUTO: 0 %
BLD GP AB SCN SERPL QL: NORMAL
BLOOD BANK CMNT PATIENT-IMP: NORMAL
BUN SERPL-MCNC: 11 MG/DL (ref 7–30)
CALCIUM SERPL-MCNC: 8.2 MG/DL (ref 8.5–10.1)
CHLORIDE SERPL-SCNC: 103 MMOL/L (ref 94–109)
CO2 SERPL-SCNC: 33 MMOL/L (ref 20–32)
CREAT SERPL-MCNC: 1.04 MG/DL (ref 0.52–1.04)
DIFFERENTIAL METHOD BLD: ABNORMAL
ELLIPTOCYTES BLD QL SMEAR: SLIGHT
EOSINOPHIL # BLD AUTO: 0 10E9/L (ref 0–0.7)
EOSINOPHIL NFR BLD AUTO: 0 %
ERYTHROCYTE [DISTWIDTH] IN BLOOD BY AUTOMATED COUNT: 16.9 % (ref 10–15)
GFR SERPL CREATININE-BSD FRML MDRD: 50 ML/MIN/1.7M2
GLUCOSE SERPL-MCNC: 105 MG/DL (ref 70–99)
HCT VFR BLD AUTO: 38.2 % (ref 35–47)
HGB BLD-MCNC: 12 G/DL (ref 11.7–15.7)
LYMPHOCYTES # BLD AUTO: 63.9 10E9/L (ref 0.8–5.3)
LYMPHOCYTES NFR BLD AUTO: 87 %
MCH RBC QN AUTO: 30.2 PG (ref 26.5–33)
MCHC RBC AUTO-ENTMCNC: 31.4 G/DL (ref 31.5–36.5)
MCV RBC AUTO: 96 FL (ref 78–100)
MONOCYTES # BLD AUTO: 5.9 10E9/L (ref 0–1.3)
MONOCYTES NFR BLD AUTO: 8 %
NEUTROPHILS # BLD AUTO: 3.7 10E9/L (ref 1.6–8.3)
NEUTROPHILS NFR BLD AUTO: 5 %
PLATELET # BLD AUTO: 79 10E9/L (ref 150–450)
PLATELET # BLD EST: ABNORMAL 10*3/UL
POTASSIUM SERPL-SCNC: 4.3 MMOL/L (ref 3.4–5.3)
RBC # BLD AUTO: 3.97 10E12/L (ref 3.8–5.2)
SODIUM SERPL-SCNC: 140 MMOL/L (ref 133–144)
SPECIMEN EXP DATE BLD: NORMAL
WBC # BLD AUTO: 73.5 10E9/L (ref 4–11)

## 2018-11-18 PROCEDURE — 86850 RBC ANTIBODY SCREEN: CPT | Performed by: PHYSICIAN ASSISTANT

## 2018-11-18 PROCEDURE — 25000128 H RX IP 250 OP 636: Performed by: EMERGENCY MEDICINE

## 2018-11-18 PROCEDURE — 12002 RPR S/N/AX/GEN/TRNK2.6-7.5CM: CPT | Mod: LT

## 2018-11-18 PROCEDURE — 99223 1ST HOSP IP/OBS HIGH 75: CPT | Mod: AI | Performed by: PHYSICIAN ASSISTANT

## 2018-11-18 PROCEDURE — 96361 HYDRATE IV INFUSION ADD-ON: CPT

## 2018-11-18 PROCEDURE — 0HQLXZZ REPAIR LEFT LOWER LEG SKIN, EXTERNAL APPROACH: ICD-10-PCS | Performed by: EMERGENCY MEDICINE

## 2018-11-18 PROCEDURE — 86900 BLOOD TYPING SEROLOGIC ABO: CPT | Performed by: PHYSICIAN ASSISTANT

## 2018-11-18 PROCEDURE — 36415 COLL VENOUS BLD VENIPUNCTURE: CPT | Performed by: PHYSICIAN ASSISTANT

## 2018-11-18 PROCEDURE — 25000125 ZZHC RX 250: Performed by: EMERGENCY MEDICINE

## 2018-11-18 PROCEDURE — 96374 THER/PROPH/DIAG INJ IV PUSH: CPT

## 2018-11-18 PROCEDURE — 86901 BLOOD TYPING SEROLOGIC RH(D): CPT | Performed by: PHYSICIAN ASSISTANT

## 2018-11-18 PROCEDURE — 12000000 ZZH R&B MED SURG/OB

## 2018-11-18 PROCEDURE — 25000128 H RX IP 250 OP 636: Performed by: PHYSICIAN ASSISTANT

## 2018-11-18 PROCEDURE — 96375 TX/PRO/DX INJ NEW DRUG ADDON: CPT

## 2018-11-18 PROCEDURE — 99285 EMERGENCY DEPT VISIT HI MDM: CPT | Mod: 25

## 2018-11-18 PROCEDURE — 80048 BASIC METABOLIC PNL TOTAL CA: CPT | Performed by: EMERGENCY MEDICINE

## 2018-11-18 PROCEDURE — 0HQKXZZ REPAIR RIGHT LOWER LEG SKIN, EXTERNAL APPROACH: ICD-10-PCS | Performed by: EMERGENCY MEDICINE

## 2018-11-18 PROCEDURE — 73502 X-RAY EXAM HIP UNI 2-3 VIEWS: CPT

## 2018-11-18 PROCEDURE — 85025 COMPLETE CBC W/AUTO DIFF WBC: CPT | Performed by: EMERGENCY MEDICINE

## 2018-11-18 PROCEDURE — 12004 RPR S/N/AX/GEN/TRK7.6-12.5CM: CPT | Mod: RT

## 2018-11-18 PROCEDURE — 40000275 ZZH STATISTIC RCP TIME EA 10 MIN

## 2018-11-18 RX ORDER — SODIUM CHLORIDE, SODIUM LACTATE, POTASSIUM CHLORIDE, CALCIUM CHLORIDE 600; 310; 30; 20 MG/100ML; MG/100ML; MG/100ML; MG/100ML
INJECTION, SOLUTION INTRAVENOUS CONTINUOUS
Status: DISCONTINUED | OUTPATIENT
Start: 2018-11-19 | End: 2018-11-19

## 2018-11-18 RX ORDER — HYDROMORPHONE HYDROCHLORIDE 1 MG/ML
0.5 INJECTION, SOLUTION INTRAMUSCULAR; INTRAVENOUS; SUBCUTANEOUS
Status: DISCONTINUED | OUTPATIENT
Start: 2018-11-18 | End: 2018-11-18

## 2018-11-18 RX ORDER — PROCHLORPERAZINE MALEATE 5 MG
5 TABLET ORAL EVERY 6 HOURS PRN
Status: DISCONTINUED | OUTPATIENT
Start: 2018-11-18 | End: 2018-11-19

## 2018-11-18 RX ORDER — ALBUTEROL SULFATE 0.83 MG/ML
2.5 SOLUTION RESPIRATORY (INHALATION) 2 TIMES DAILY
Status: DISCONTINUED | OUTPATIENT
Start: 2018-11-18 | End: 2018-11-19

## 2018-11-18 RX ORDER — IPRATROPIUM BROMIDE AND ALBUTEROL SULFATE 2.5; .5 MG/3ML; MG/3ML
3 SOLUTION RESPIRATORY (INHALATION)
Status: DISCONTINUED | OUTPATIENT
Start: 2018-11-18 | End: 2018-11-19

## 2018-11-18 RX ORDER — FERROUS SULFATE 325(65) MG
325 TABLET ORAL DAILY
Status: DISCONTINUED | OUTPATIENT
Start: 2018-11-19 | End: 2018-11-19

## 2018-11-18 RX ORDER — ONDANSETRON 2 MG/ML
4 INJECTION INTRAMUSCULAR; INTRAVENOUS EVERY 30 MIN PRN
Status: DISCONTINUED | OUTPATIENT
Start: 2018-11-18 | End: 2018-11-18

## 2018-11-18 RX ORDER — METOPROLOL SUCCINATE 25 MG/1
25 TABLET, EXTENDED RELEASE ORAL DAILY
Status: DISCONTINUED | OUTPATIENT
Start: 2018-11-19 | End: 2018-11-19

## 2018-11-18 RX ORDER — TRAZODONE HYDROCHLORIDE 100 MG/1
100 TABLET ORAL AT BEDTIME
Status: DISCONTINUED | OUTPATIENT
Start: 2018-11-18 | End: 2018-11-19

## 2018-11-18 RX ORDER — LEVOTHYROXINE SODIUM 75 UG/1
75 TABLET ORAL DAILY
Status: DISCONTINUED | OUTPATIENT
Start: 2018-11-19 | End: 2018-11-19

## 2018-11-18 RX ORDER — NALOXONE HYDROCHLORIDE 0.4 MG/ML
.1-.4 INJECTION, SOLUTION INTRAMUSCULAR; INTRAVENOUS; SUBCUTANEOUS
Status: DISCONTINUED | OUTPATIENT
Start: 2018-11-18 | End: 2018-11-19

## 2018-11-18 RX ORDER — POLYETHYLENE GLYCOL 3350 17 G/17G
17 POWDER, FOR SOLUTION ORAL DAILY PRN
Status: DISCONTINUED | OUTPATIENT
Start: 2018-11-18 | End: 2018-11-19

## 2018-11-18 RX ORDER — PROCHLORPERAZINE 25 MG
12.5 SUPPOSITORY, RECTAL RECTAL EVERY 12 HOURS PRN
Status: DISCONTINUED | OUTPATIENT
Start: 2018-11-18 | End: 2018-11-19

## 2018-11-18 RX ORDER — ATORVASTATIN CALCIUM 20 MG/1
20 TABLET, FILM COATED ORAL DAILY
Status: DISCONTINUED | OUTPATIENT
Start: 2018-11-19 | End: 2018-11-19

## 2018-11-18 RX ORDER — AMOXICILLIN 250 MG
2 CAPSULE ORAL 2 TIMES DAILY PRN
Status: DISCONTINUED | OUTPATIENT
Start: 2018-11-18 | End: 2018-11-19

## 2018-11-18 RX ORDER — GABAPENTIN 300 MG/1
300 CAPSULE ORAL 2 TIMES DAILY
COMMUNITY
End: 2018-12-19

## 2018-11-18 RX ORDER — ONDANSETRON 4 MG/1
4 TABLET, ORALLY DISINTEGRATING ORAL EVERY 6 HOURS PRN
Status: DISCONTINUED | OUTPATIENT
Start: 2018-11-18 | End: 2018-11-19

## 2018-11-18 RX ORDER — AMOXICILLIN 250 MG
1 CAPSULE ORAL 2 TIMES DAILY PRN
Status: DISCONTINUED | OUTPATIENT
Start: 2018-11-18 | End: 2018-11-19

## 2018-11-18 RX ORDER — ALBUTEROL SULFATE 90 UG/1
2 AEROSOL, METERED RESPIRATORY (INHALATION) EVERY 6 HOURS PRN
Status: DISCONTINUED | OUTPATIENT
Start: 2018-11-18 | End: 2018-11-19

## 2018-11-18 RX ORDER — ACETAMINOPHEN 325 MG/1
650 TABLET ORAL EVERY 4 HOURS PRN
Status: DISCONTINUED | OUTPATIENT
Start: 2018-11-18 | End: 2018-11-19

## 2018-11-18 RX ORDER — OXYCODONE HYDROCHLORIDE 5 MG/1
5-10 TABLET ORAL
Status: DISCONTINUED | OUTPATIENT
Start: 2018-11-18 | End: 2018-11-19

## 2018-11-18 RX ORDER — ONDANSETRON 2 MG/ML
4 INJECTION INTRAMUSCULAR; INTRAVENOUS EVERY 6 HOURS PRN
Status: DISCONTINUED | OUTPATIENT
Start: 2018-11-18 | End: 2018-11-19

## 2018-11-18 RX ORDER — SODIUM CHLORIDE 9 MG/ML
1000 INJECTION, SOLUTION INTRAVENOUS CONTINUOUS
Status: DISCONTINUED | OUTPATIENT
Start: 2018-11-18 | End: 2018-11-18

## 2018-11-18 RX ORDER — GABAPENTIN 300 MG/1
300 CAPSULE ORAL 2 TIMES DAILY
Status: DISCONTINUED | OUTPATIENT
Start: 2018-11-18 | End: 2018-11-19

## 2018-11-18 RX ORDER — ALBUTEROL SULFATE 0.83 MG/ML
2.5 SOLUTION RESPIRATORY (INHALATION) 2 TIMES DAILY
COMMUNITY
End: 2018-12-17 | Stop reason: ALTCHOICE

## 2018-11-18 RX ORDER — LIDOCAINE 40 MG/G
CREAM TOPICAL
Status: DISCONTINUED | OUTPATIENT
Start: 2018-11-18 | End: 2018-11-19

## 2018-11-18 RX ORDER — HYDROMORPHONE HYDROCHLORIDE 1 MG/ML
0.2 INJECTION, SOLUTION INTRAMUSCULAR; INTRAVENOUS; SUBCUTANEOUS EVERY 4 HOURS PRN
Status: DISCONTINUED | OUTPATIENT
Start: 2018-11-18 | End: 2018-11-19

## 2018-11-18 RX ORDER — FLUORIDE TOOTHPASTE
15 TOOTHPASTE DENTAL 4 TIMES DAILY PRN
Status: DISCONTINUED | OUTPATIENT
Start: 2018-11-18 | End: 2018-11-19

## 2018-11-18 RX ORDER — TRAZODONE HYDROCHLORIDE 50 MG/1
100 TABLET, FILM COATED ORAL AT BEDTIME
COMMUNITY
End: 2019-01-21

## 2018-11-18 RX ADMIN — ONDANSETRON 4 MG: 2 INJECTION INTRAMUSCULAR; INTRAVENOUS at 19:23

## 2018-11-18 RX ADMIN — Medication 3 ML: at 19:22

## 2018-11-18 RX ADMIN — SODIUM CHLORIDE 500 ML: 9 INJECTION, SOLUTION INTRAVENOUS at 19:23

## 2018-11-18 RX ADMIN — Medication 0.5 MG: at 19:24

## 2018-11-18 RX ADMIN — Medication 0.2 MG: at 22:14

## 2018-11-18 ASSESSMENT — ENCOUNTER SYMPTOMS
ARTHRALGIAS: 1
WOUND: 1

## 2018-11-18 NOTE — IP AVS SNAPSHOT
Marie Kline #[id number] (CSN:[id number])  (86 year old F)  (Adm: 18)     [id number]               [facility] ORTHO SPINE: [phone]            Patient Demographics     Patient Name Sex          Age SSN Address Phone    Raisa Marie CHAPARRITA Female 1932 (86 year old) [id number] [place] [address] [address]  [facility] [address] [phone] (Home)  none (Work)  [phone] (Mobile) *Preferred*      Emergency Contact(s)     Name Relation Home Work Mobile    Margaret Martines Daughter [phone]    Wenceslao Zhao   [phone]    Rico Bonilla Grandchild   [phone]      Admission Information     Attending Provider Admitting Provider Admission Type Admission Date/Time    Scar Reynolds MD Nemanich, Joseph Patrick, MD Emergency 18  1826    Discharge Date Hospital Service Auth/Cert Status Service Area     Orthopedics Incomplete [facility]    Unit Room/Bed Admission Status        ORTHO SPINE [id number]- Admission (Confirmed)       Admission     Complaint    Femoral neck fracture (H), Left femoral neck fracture, S/P total hip arthroplasty      Hospital Account     Name Acct ID Class Status Primary Coverage    Marie Kline [id number] Inpatient Open MEDICARE - MEDICARE            Guarantor Account (for Hospital Account #[id number])     Name Relation to Pt Service Area Active? Acct Type    Marie Kline Self FCS Yes Personal/Family    Address Phone          [address] [place] [address] [address]  [place] [id number] [phone](H)              Coverage Information (for Hospital Account #[id number])     1. MEDICARE/MEDICARE     F/O Payor/Plan Precert #    MEDICARE/MEDICARE     Subscriber Subscriber #    Marie Kline [id number]    Address Phone    ATTN CLAIMS  [address]  [place] [address] [phone]          2. [place]     F/[place] Payor/Plan Precert #    [place]     Subscriber Subscriber #     "Marie Kline [id number]    Address Phone    [address] [address]  [place], [place] [address] [phone]                                                      INTERAGENCY TRANSFER FORM - PHYSICIAN ORDERS   11/18/2018                       [facility] ORTHO SPINE: [phone]            Attending Provider: Scar Reynolds MD     Allergies:  Diagnostic X-ray Materials, Contrast Dye, Wound Dressing Adhesive    Infection:  None   Service:  ORTHOPEDICS    Ht:  1.499 m (4' 11\")   Wt:  47.2 kg (104 lb)   Admission Wt:  47.2 kg (104 lb)    BMI:  21.01 kg/m 2   BSA:  1.4 m 2            ED Clinical Impression     Diagnosis Description Comment Added By Time Added    Fracture of hip, left, closed, initial encounter (H) [S72.002A] Fracture of hip, left, closed, initial encounter (H) [S72.002A]  Sadiq Alexandre MD 11/18/2018  7:59 PM    Skin tear of lower leg without complication, unspecified laterality, initial encounter [S81.819A] Skin tear of lower leg without complication, unspecified laterality, initial encounter [S81.819A]  Sadiq Alexandre MD 11/18/2018  7:59 PM      Hospital Problems as of 11/22/2018              Priority Class Noted POA    Femoral neck fracture (H) Medium  11/18/2018 Yes    S/P total hip arthroplasty Medium  11/19/2018 Yes      Non-Hospital Problems as of 11/22/2018              Priority Class Noted    Acute and chronic respiratory failure with hypercapnia (HCC) Medium  10/8/2014    Nonischemic cardiomyopathy (H) Medium  10/20/2014    Pneumonia Medium  10/20/2014    Acute myocardial infarction, initial episode of care (HCC) Medium  10/20/2014    CLL (chronic lymphocytic leukemia) (HCC) Medium  10/20/2014    CHF (congestive heart failure) (HCC) Medium  10/28/2014    Cardiomyopathy in other diseases classified elsewhere (HCC) Medium  Unknown    Hyperlipidemia with target LDL less than 130 Medium  11/11/2014    Health Care Home Medium  11/13/2014    COPD exacerbation (H) Medium  " 1/27/2015    LESLY (generalized anxiety disorder) Medium  7/13/2015    Hypothyroidism Medium  7/13/2015    Back pain with radiation Medium  10/5/2015    DDD (degenerative disc disease), lumbar Medium  10/22/2015    Splenomegaly Medium  11/5/2015    Lumbar degenerative disc disease Medium  11/5/2015    Lumbar foraminal stenosis Medium  11/5/2015    Central spinal stenosis Medium  11/5/2015    Bladder cancer (H) Medium  2/9/2016    Sepsis (H) Medium  6/15/2016    Senile osteoporosis Medium  10/27/2016    CKD (chronic kidney disease) stage 3, GFR 30-59 ml/min (H) Medium  10/28/2016    Purpura senilis (H) Medium  2/3/2017    Xerosis cutis Medium  2/3/2017    Malignant neoplasm of urinary bladder, unspecified site (H) Medium  5/31/2017    Hypoxia Medium  1/29/2018      Code Status History     Date Active Date Inactive Code Status Order ID Comments User Context    11/18/2018 10:01 PM 11/19/2018  3:22 PM Full Code [id number]  Joselin Carrasco PA-C Inpatient    2/3/2018 11:40 AM 11/18/2018 10:01 PM Full Code [id number]  Hai Jeffries MD Outpatient    1/29/2018  7:14 PM 2/3/2018 11:40 AM Full Code [id number]  Rangel Alvarez MD Inpatient    3/27/2017 12:21 PM 1/29/2018  7:14 PM Full Code [id number]  Sha Ponce MD Outpatient    3/17/2017  9:42 AM 3/27/2017 12:21 PM Full Code [id number]  Wenceslao Craig, DO Inpatient    6/28/2016 10:46 AM 3/17/2017  9:42 AM Full Code [id number]  Eduarda Shrestha MD Outpatient    6/15/2016  3:26 PM 6/28/2016 10:46 AM Full Code [id number]  Sabino Kidd, DO Inpatient    6/13/2016  5:56 PM 6/15/2016  3:26 PM Full Code [id number]  Sabino Kidd, DO Inpatient    2/9/2016  3:32 PM 2/10/2016  2:07 PM Full Code [id number]  Kar Nuñez MD Inpatient    2/6/2015 12:03 PM 2/9/2016  3:32 PM Full Code [id number]  Eduarda Shrestha MD Outpatient    1/27/2015 10:44 AM 2/6/2015 12:03 PM Full Code [id number]  Ksenia Bowman PA-C ED    10/17/2014  8:47 AM 1/27/2015  10:44 AM DNR/DNI [id number]  Wenceslao Craig DO Outpatient    10/8/2014  3:50 PM 10/17/2014  8:47 AM DNR/DNI [id number]  Ildefonso Goddard MD Inpatient      Current Code Status     Date Active Code Status Order ID Comments User Context       11/19/2018  3:43 PM Full Code [id number]  Wenceslao Craig DO Inpatient       Questions for Current Code Status     Question Answer Comment    Code status determined by: Discussion with patient/legal decision maker       Summary of Visit     Reason for your hospital stay       Left femoral neck fracture, left hip hemiarthroplasty                Medication Review      START taking        Dose / Directions Comments    acetaminophen 325 MG tablet   Commonly known as:  TYLENOL   Used for:  Fracture of hip, left, closed, initial encounter (H)        Dose:  650 mg   Take 2 tablets (650 mg) by mouth every 4 hours as needed for mild pain   Quantity:  40 tablet   Refills:  0        aspirin 325 MG EC tablet   Commonly known as:  ASA   Used for:  Fracture of hip, left, closed, initial encounter (H)        Dose:  325 mg   Take 1 tablet (325 mg) by mouth daily   Quantity:  45 tablet   Refills:  0        ondansetron 4 MG ODT tab   Commonly known as:  ZOFRAN-ODT   Used for:  Fracture of hip, left, closed, initial encounter (H)        Dose:  4 mg   Take 1 tablet (4 mg) by mouth every 6 hours as needed for nausea or vomiting   Quantity:  20 tablet   Refills:  0        senna-docusate 8.6-50 MG per tablet   Commonly known as:  SENOKOT-S;PERICOLACE   Used for:  Fracture of hip, left, closed, initial encounter (H)        Dose:  2 tablet   Take 2 tablets by mouth 2 times daily   Quantity:  40 tablet   Refills:  0        traMADol 50 MG tablet   Commonly known as:  ULTRAM   Used for:  Fracture of hip, left, closed, initial encounter (H)        Dose:  50 mg   Take 1 tablet (50 mg) by mouth every 4 hours as needed for moderate pain   Quantity:  60 tablet   Refills:  0          CONTINUE these  medications which have NOT CHANGED        Dose / Directions Comments    * albuterol (2.5 MG/3ML) 0.083% neb solution        Dose:  2.5 mg   Take 2.5 mg by nebulization 2 times daily   Refills:  0        * albuterol 108 (90 Base) MCG/ACT inhaler   Commonly known as:  PROAIR HFA/PROVENTIL HFA/VENTOLIN HFA   Used for:  Intermittent asthma without complication        Dose:  2 puff   Inhale 2 puffs into the lungs every 6 hours as needed for wheezing   Quantity:  1 Inhaler   Refills:  8        atorvastatin 20 MG tablet   Commonly known as:  LIPITOR   Used for:  ACS (acute coronary syndrome) (H), Hyperlipidemia with target LDL less than 130        TAKE ONE TABLET BY MOUTH ONE TIME DAILY   Quantity:  90 tablet   Refills:  1        budesonide-formoterol 80-4.5 MCG/ACT Inhaler   Commonly known as:  SYMBICORT   Used for:  COPD exacerbation (H)        Dose:  2 puff   Inhale 2 puffs into the lungs 2 times daily   Quantity:  3 Inhaler   Refills:  1        denosumab 60 MG/ML Soln injection   Commonly known as:  PROLIA   Used for:  Senile osteoporosis, CKD (chronic kidney disease) stage 3, GFR 30-59 ml/min (H)        Dose:  60 mg   Inject 1 mL (60 mg) Subcutaneous every 6 months   Quantity:  1 mL   Refills:  0        ferrous sulfate 325 (65 Fe) MG tablet   Commonly known as:  IRON        Take by mouth daily (with breakfast)   Refills:  0        furosemide 20 MG tablet   Commonly known as:  LASIX   Used for:  Localized edema        TAKE ONE TABLET BY MOUTH ONE TIME DAILY   Quantity:  90 tablet   Refills:  1        gabapentin 300 MG capsule   Commonly known as:  NEURONTIN        Dose:  300 mg   Take 300 mg by mouth 2 times daily   Refills:  0        levothyroxine 75 MCG tablet   Commonly known as:  SYNTHROID/LEVOTHROID   Used for:  Other specified hypothyroidism        TAKE ONE TABLET BY MOUTH ONE TIME DAILY   Quantity:  90 tablet   Refills:  1        metoprolol succinate 25 MG 24 hr tablet   Commonly known as:  TOPROL-XL   Used for:   ACS (acute coronary syndrome) (H)        Dose:  25 mg   Take 1 tablet (25 mg) by mouth daily   Quantity:  90 tablet   Refills:  3        MULTIVITAMIN GUMMIES ADULT PO        Take by mouth daily   Refills:  0        order for DME   Used for:  COPD exacerbation (H)        Equipment being ordered: Nebulizer Fax order to [facility] [phone]   Quantity:  1 each   Refills:  0        traZODone 50 MG tablet   Commonly known as:  DESYREL        Dose:  100 mg   Take 100 mg by mouth At Bedtime   Refills:  0        * Notice:  This list has 2 medication(s) that are the same as other medications prescribed for you. Read the directions carefully, and ask your doctor or other care provider to review them with you.            After Care     Activity - Up with assistive device           Advance Diet as Tolerated       Follow this diet upon discharge: Orders Placed This Encounter      Advance Diet as Tolerated: Regular Diet Adult       Fall precautions           General info for SNF       Length of Stay Estimate: Short Term Care: Estimated # of Days <30  Condition at Discharge: Improving  Level of care:skilled   Rehabilitation Potential: Good  Admission H&P remains valid and up-to-date: Yes  Recent Chemotherapy: N/A  Use Nursing Home Standing Orders: Yes       Hip precautions           Mantoux instructions       Give two-step Mantoux (PPD) Per Facility Policy Yes       Weight bearing status           Wound care       Site:   Left hip  Instructions:  Leave dressing intact if Aquacel and remove at POD #7, remove staples POD #14  Daily dressing changes with gauze and tape             Procedures     Oxygen - Nasal cannula       1-2 Lpm by nasal cannula to keep O2 sats 92% or greater.             Referrals     Occupational Therapy Adult Consult       Evaluate and treat as clinically indicated.    Reason:  left hip hemiarthroplasty       Physical Therapy Adult Consult       Evaluate and treat as clinically  "indicated.    Reason:  left hip hemiarthroplasty             Follow-Up Appointment Instructions     Follow Up and recommended labs and tests       Follow up with Dr. Reynolds in 2 weeks.  No follow up labs or test are needed.  Call for appointment [phone]             Statement of Approval     Ordered          11/21/18 9200  I have reviewed and agree with all the recommendations and orders detailed in this document.  EFFECTIVE NOW     Approved and electronically signed by:  Wenceslao Craig DO                                                 INTERAGENCY TRANSFER FORM - NURSING   11/18/2018                       [facility] SPINE: [phone]            Attending Provider: Scar Reynolds MD     Allergies:  Diagnostic X-ray Materials, Contrast Dye, Wound Dressing Adhesive    Infection:  None   Service:  ORTHOPEDICS    Ht:  1.499 m (4' 11\")   Wt:  47.2 kg (104 lb)   Admission Wt:  47.2 kg (104 lb)    BMI:  21.01 kg/m 2   BSA:  1.4 m 2            Advance Directives        Scanned docmt in ACP Activity?           No scanned doc        Immunizations     Name Date      HepA-Adult 04/08/09     HepA-Adult 10/20/08     HepA-Adult 09/18/08     HepB-Adult 04/08/09     HepB-Adult 10/20/08     HepB-Adult 09/18/08     Influenza (High Dose) 3 valent vaccine 09/21/17     Influenza (High Dose) 3 valent vaccine 10/16/15     Influenza (IIV3) PF 09/24/14     Influenza (intradermal) 09/24/13     Influenza (intradermal) 11/30/12     Influenza (intradermal) 09/20/11     Mantoux Tuberculin Skin Test 02/22/16     Pneumo Conj 13-V (2010&after) 09/22/15     Pneumococcal 23 valent 09/24/13     Pneumococcal 23 valent 06/14/06     Tdap (Adacel,Boostrix) 07/10/14     Tdap (Adult) Unspecified Formulation 07/10/14       ASSESSMENT     Discharge Profile Flowsheet     EXPECTED DISCHARGE     FINAL RESOURCES      Expected Discharge Date  -- (TCU planned ) 11/20/18 1246   Resources List  Skilled Nursing Facility 11/20/18 1046 "    DISCHARGE NEEDS ASSESSMENT     PAS Number  [id number] 11/21/18 1352    Equipment Currently Used at Home  walker, rolling;wheelchair, manual;raised toilet;shower chair;cane, straight 11/20/18 0954   Existing Resources/Services  None 02/04/15 1104    # of Referrals Placed by CTS  Post Acute Facilities 11/20/18 1046   SKIN      Does Patient Need a Referral for Clinic CC  Yes 08/25/16 1146   Inspection of bony prominences  Full 11/22/18 0900    New Steerage to  Clinics?  No 11/20/18 1046   Full except areas not inspected   Spine;Buttock, left;Buttock, right;Sacrum;Coccyx 11/21/18 1929    Primary Care Clinic Name  [facility] 01/30/18 1144   Inspection under devices  Full except (identify device(s) not inspected) 11/22/18 0900    Primary Care MD Name  Dr Kiser 01/30/18 1144   Skin WDL  ex 11/22/18 0900    Equipment Used at Home  cane, straight;walker, rolling;wheelchair (shower seat, 4WW) 02/02/15 0848   Skin Color/Characteristics  bruised (ecchymotic);carleen 11/22/18 0900    GASTROINTESTINAL (ADULT,PEDIATRIC,OB)     Skin Temperature  warm 11/22/18 0900    GI WDL  ex 11/22/18 0900   Skin Moisture  dry 11/22/18 0900    Abdominal Appearance  inguinal bulge 11/21/18 0919   Skin Elasticity  quick return to original state 11/20/18 0150    All Quadrants Bowel Sounds  hypoactive 11/22/18 0900   Skin Integrity  bruise(s);incision(s);scab(s);skin tear(s) 11/22/18 0900    Last Bowel Movement  11/22/18 11/22/18 0900   Not Inspected under devices  Other (drsgs) 11/22/18 0900    GI Signs/Symptoms  abdominal discomfort;other (see comments) (some cramping) 11/21/18 2001   SAFETY      Passing flatus  yes 11/22/18 0900   Safety WDL  WDL 11/22/18 0900    COMMUNICATION ASSESSMENT     All Alarms  alarm(s) activated and audible 11/22/18 0900    Patient's communication style  spoken language (English or Bilingual) 11/18/18 1831                      Assessment WDL (Within Defined Limits) Definitions           Safety WDL      "Effective: 09/28/15    Row Information: <b>WDL Definition:</b> Bed in low position, wheels locked; call light in reach; upper side rails up x 2; ID band on<br> <font color=\"gray\"><i>Item=AS safety wdl>>List=AS safety wdl>>Version=F14</i></font>      Skin WDL     Effective: 09/28/15    Row Information: <b>WDL Definition:</b> Warm; dry; intact; elastic; without discoloration; pressure points without redness<br> <font color=\"gray\"><i>Item=AS skin wdl>>List=AS skin wdl>>Version=F14</i></font>      Vitals     Vital Signs Flowsheet     VITAL SIGNS     Pain Intervention(s)  Medication (See eMAR) 11/21/18 1335    Temp  97.9  F (36.6  C) 11/22/18 0841   Response to Interventions  Absence of nonverbal indicators of pain 11/21/18 1445    Temp src  Oral 11/22/18 0841   ANALGESIA SIDE EFFECTS MONITORING      Resp  16 11/22/18 0841   Side Effects Monitoring: Respiratory Quality  R 11/22/18 0910    Pulse  64 11/20/18 0432   Side Effects Monitoring: Respiratory Depth  N 11/22/18 0910    Heart Rate  87 11/22/18 0841   Side Effects Monitoring: Sedation Level  1 11/22/18 0910    Pulse/Heart Rate Source  Monitor 11/22/18 0841   HEIGHT AND WEIGHT      BP  110/55 11/22/18 0841   Height Method  Stated 11/18/18 1834    BP Location  Left arm 11/21/18 1520   Weight  47.2 kg (104 lb) 11/18/18 1834    OXYGEN THERAPY     GERALDINE COMA SCALE      SpO2  95 % 11/22/18 0841   Best Eye Response  4-->(E4) spontaneous 11/19/18 1549    O2 Device  Nasal cannula 11/22/18 0841   Best Motor Response  6-->(M6) obeys commands 11/19/18 1549    FiO2 (%)  100 % 11/19/18 1126   Best Verbal Response  5-->(V5) oriented 11/19/18 1549    Oxygen Delivery  2 LPM 11/22/18 0841   Geraldine Coma Scale Score  15 11/19/18 1549    RESPIRATORY MONITORING     POSITIONING      Respiratory Monitoring (EtCO2)  29 mmHg 11/20/18 0757   Body Position  supine, head elevated 11/22/18 0903    Integrated Pulmonary Index (IPI)  8-9 11/20/18 0757   Positioning/Transfer Devices  pillows;in " use 11/22/18 0903    PAIN/COMFORT     Head of Bed (HOB)  HOB at 20-30 degrees 11/22/18 0903    Patient Currently in Pain  denies 11/22/18 0910   DAILY CARE      Preferred Pain Scale  number (Numeric Rating Pain Scale) 11/21/18 1335   Activity Management  activity adjusted per tolerance;activity encouraged 11/22/18 0903    Patient's Stated Pain Goal  4 11/21/18 1335   Activity Assistance Provided  assistance, 1 person 11/22/18 0903    0-10 Pain Scale  0 11/21/18 1738   Assistive Device Utilized  gait belt;walker 11/22/18 0903    Pain Location  Hip 11/21/18 1335   ECG      Pain Orientation  Left 11/21/18 1335   ECG Rhythm  Normal sinus rhythm 11/19/18 1444    Pain Descriptors  Discomfort 11/20/18 0207   Ectopy  None 11/19/18 1316    Pain Management Interventions  relaxation techniques promoted;pillow support provided;pain plan reviewed with patient/caregiver;analgesia administered 11/22/18 0910   Lead Monitored  Lead II 11/19/18 1316            Patient Lines/Drains/Airways Status    Active LINES/DRAINS/AIRWAYS     Name: Placement date: Placement time: Site: Days: Last dressing change:    Peripheral IV 11/18/18 Left Upper forearm 11/18/18      Upper forearm   4     Wound 11/18/18 Anterior;Lower;Right Leg Skin tear x2 11/18/18   2300   Leg   3     Wound 11/18/18 Anterior;Left;Lower Leg Skin tear 11/18/18   2300   Leg   3     Wound 11/20/18 Left Arm Skin tear 11/20/18   1549   Arm   1     Incision/Surgical Site 02/09/16 Other (Comment) 02/09/16   1354    1016     Incision/Surgical Site 10/23/18 Right Groin 10/23/18   1138    30     Incision/Surgical Site 11/19/18 Left Hip 11/19/18   0957    3             Patient Lines/Drains/Airways Status    Active PICC/CVC     None            Intake/Output Detail Report     Date Intake     Output   Net    Shift P.O. I.V. IV Piggyback Total Urine Blood Total       Noc 11/20/18 2300 - 11/21/18 0659 400 -- -- 400 1150 -- 1150 -750    Day 11/21/18 0700 - 11/21/18 1459 -- -- -- -- 550 --  550 -550    Rea 11/21/18 1500 - 11/21/18 2259 220 3 -- 223 825 -- 825 -602    Noc 11/21/18 2300 - 11/22/18 0659 360 -- -- 360 300 -- 300 60    Day 11/22/18 0700 - 11/22/18 1459 360 -- -- 360 -- -- -- 360      Last Void/BM       Most Recent Value    Urine Occurrence 1 at 11/22/2018 1105    Stool Occurrence 1 at 11/22/2018 1105      Case Management/Discharge Planning     Case Management/Discharge Planning Flowsheet     REFERRAL INFORMATION     Does Patient Need a Referral for Clinic CC  Yes 08/25/16 1146    Admission Type  inpatient 11/20/18 1046   Equipment Used at Home  cane, straight;walker, rolling;wheelchair (shower seat, 4WW) 02/02/15 0848    Arrived From  home or self-care 11/20/18 1046   PATIENT PLACEMENT INFORMATION      Referral Source  other (see comments) ([facility] ) 11/20/18 1046   Did the patient choose [place]?  Yes 11/20/18 1046    New Steerage to  Clinics?  No 11/20/18 1046   Placement Choice Reason  location 11/20/18 1046    # of Referrals Placed by CTS  Post Acute Facilities 11/20/18 1046   FINAL RESOURCES      Reason For Consult  discharge planning (coordination of planning with [facility] ) 11/20/18 1046   Equipment Currently Used at Home  walker, rolling;wheelchair, manual;raised toilet;shower chair;cane, straight 11/20/18 0954    CTS Assigned to Case  Arleth Maurice 11/20/18 1046   Resources List  Skilled Nursing Facility 11/20/18 1046    Primary Care Clinic Name  [facility] 01/30/18 1144   PAS Number  [id number] 11/21/18 1352    Primary Care MD Name  Dr Kiser 01/30/18 1144   Existing Resources/Services  None 02/04/15 1104    LIVING ENVIRONMENT     ABUSE RISK SCREEN      Lives With  alone 11/20/18 0950   QUESTION TO PATIENT:  Has a member of your family or a partner(now or in the past) intimidated, hurt, manipulated, or controlled you in any way?  no 11/19/18 0753    Living Arrangements  independent living facility ([facility]) 11/20/18 0950   QUESTION TO  PATIENT: Do you feel safe going back to the place where you are living?  yes 11/19/18 0753    COPING/STRESS     OBSERVATION: Is there reason to believe there has been maltreatment of a vulnerable adult (ie. Physical/Sexual/Emotional abuse, self neglect, lack of adequate food, shelter, medical care, or financial exploitation)?  no 11/19/18 0753    Major Change/Loss/Stressor  hospitalization 11/18/18 2258   OTHER      Patient Personal Strengths  strong support system 03/18/17 1155   Are you depressed or being treated for depression?  No 11/19/18 0409    EXPECTED DISCHARGE     HOMICIDE RISK      Expected Discharge Date  -- (TCU planned ) 11/20/18 1246   Feels Like Hurting Others  no 11/18/18 1832    DISCHARGE PLANNING                         [facility] SPINE: [phone]            Medication Administration Report for Marie Kline as of 11/22/18 1310   Legend:    Given Hold Not Given Due Canceled Entry Other Actions    Time Time (Time) Time  Time-Action       Inactive    Active    Linked        Medications 11/16/18 11/17/18 11/18/18 11/19/18 11/20/18 11/21/18 11/22/18    acetaminophen (TYLENOL) tablet 975 mg  Dose: 975 mg  Freq: EVERY 8 HOURS Route: PO  Start: 11/19/18 1600   End: 11/22/18 1559   Admin Instructions: Do not use if patient has an active opioid/acetaminophen combined analgesic product ordered for pain.  Maximum acetaminophen dose from all sources = 75 mg/kg/day not to exceed 4 grams/day.    Admin. Amount: 3 tablet (3 × 325 mg tablet)  Last Admin: 11/22/18 0847  Dispense Loc:  ADS MS6W  Administrations Remaining: 3  POC: Post-procedure        1635 (975 mg)-Given       2359 (975 mg)-Given        0801 (975 mg)-Given       (1631)-Not Given        (0000)-Not Given [C]       0903 (975 mg)-Given       1738 (975 mg)-Given        (0000)-Not Given       0847 (975 mg)-Given       1559-Med Discontinued       atorvastatin (LIPITOR) tablet 20 mg  Dose: 20 mg  Freq: DAILY Route: PO  Start: 11/19/18 6012    Admin. Amount: 1 tablet (1 × 20 mg tablet)  Last Admin: 11/22/18 0846  Dispense Loc:  ADS MS6W        1635 (20 mg)-Given        0801 (20 mg)-Given        0904 (20 mg)-Given        0846 (20 mg)-Given           benzocaine-menthol (CHLORASEPTIC) 6-10 MG lozenge 1-2 lozenge  Dose: 1-2 lozenge  Freq: EVERY 1 HOUR PRN Route: BU  PRN Reason: sore throat  PRN Comment: sore throat without fever  Start: 11/19/18 1522   Admin. Amount: 1-2 lozenge  Dispense Loc:  ADS MS6W  POC: Post-procedure               fluticasone-vilanterol (BREO ELLIPTA) 100-25 MCG/INH inhaler 1 puff  Dose: 1 puff  Freq: DAILY Route: IN  Start: 11/19/18 1545   Admin Instructions: *  Rinse mouth after use.<br><br>  Auto-substituted for Symbicort 80/4.5 mcg 2 inhalations bid    Admin. Amount: 1 puff  Last Admin: 11/22/18 0847  Dispense Loc:  Main Pharmacy                0740 (1 puff)-Given        1104 (1 puff)-Given        0847 (1 puff)-Given           gabapentin (NEURONTIN) capsule 300 mg  Dose: 300 mg  Freq: 2 TIMES DAILY Route: PO  Start: 11/21/18 2145   Admin. Amount: 1 capsule (1 × 300 mg capsule)  Last Admin: 11/22/18 0846  Dispense Loc:  [facility]          2151 (300 mg)-Given        0846 (300 mg)-Given       [ ] 2000           HYDROmorphone (PF) (DILAUDID) injection 0.2 mg  Dose: 0.2 mg  Freq: EVERY 2 HOURS PRN Route: IV  PRN Reason: other  PRN Comment: pain control or improvement in physical function.  Hold dose for analgesic side effects.  Start: 11/19/18 1522   Admin Instructions: Notify provider to assess for uncontrolled pain or analgesic side effects. Hold while on IV PCA or with regular IV opioid dosing.  For ordered IV doses 0.1-4 mg give IV Push undiluted. Administer each 2mg over 2-5 minutes.    Admin. Amount: 0.2 mg  Dispense Loc:  [facility]  POC: Post-procedure               hydrOXYzine (ATARAX) tablet 25 mg  Dose: 25 mg  Freq: 3 TIMES DAILY PRN Route: PO  PRN Reason: other  PRN Comment: pain  Start: 11/19/18 1522   Admin.  "Amount: 1 tablet (1 × 25 mg tablet)  Dispense Loc: [facility]  POC: Post-procedure               levothyroxine (SYNTHROID/LEVOTHROID) tablet 75 mcg  Dose: 75 mcg  Freq: DAILY Route: PO  Start: 11/19/18 1545   Admin Instructions: Separate oral administration of iron- or calcium-containing products and levothyroxine by at least 4 hours.    Admin. Amount: 1 tablet (1 × 75 mcg tablet)  Last Admin: 11/22/18 0846  Dispense Loc: [facility]        1635 (75 mcg)-Given        0802 (75 mcg)-Given        0904 (75 mcg)-Given        0846 (75 mcg)-Given           lidocaine (LMX4) cream  Freq: EVERY 1 HOUR PRN Route: Top  PRN Reason: pain  PRN Comment: with VAD insertion or accessing implanted port.  Start: 11/19/18 1522   Admin Instructions: Do NOT give if patient has a history of allergy to any local anesthetic or any \"juanjo\" product.   Apply 30 minutes prior to VAD insertion or port access.  MAX Dose:  2.5 g (  of 5 g tube)    Dispense Loc: [facility]  POC: Post-procedure               lidocaine 1 % 1 mL  Dose: 1 mL  Freq: EVERY 1 HOUR PRN Route: OTHER  PRN Comment: mild pain with VAD insertion or accessing implanted port  Start: 11/19/18 1522   Admin Instructions: Do NOT give if patient has a history of allergy to any local anesthetic or any \"juanjo\" product. MAX dose 1 mL subcutaneous OR intradermal in divided doses.    Admin. Amount: 1 mL  Dispense Loc: [facility] Stock  Volume: 2 mL  POC: Post-procedure               naloxone (NARCAN) injection 0.1-0.4 mg  Dose: 0.1-0.4 mg  Freq: EVERY 2 MIN PRN Route: IV  PRN Reason: opioid reversal  Start: 11/19/18 1522   Admin Instructions: For respiratory rate LESS than or EQUAL to 8.  Partial reversal dose:  0.1 mg titrated q 2 minutes for Analgesia Side Effects Monitoring Sedation Level of 3 (frequently drowsy, arousable, drifts to sleep during conversation).Full reversal dose:  0.4 mg bolus for Analgesia Side Effects Monitoring Sedation Level of 4 (somnolent, minimal or no response " to stimulation).  For ordered IV doses 0.1-2mg give IVP. Give each 0.4mg over 15 seconds in emergency situations. For non-emergent situations further dilute in 9mL of NS to facilitate titration of response.    Admin. Amount: 0.1-0.4 mg = 0.25-1 mL Conc: 0.4 mg/mL  Dispense Loc: [facility]  Volume: 1 mL  POC: Post-procedure               ondansetron (ZOFRAN-ODT) ODT tab 4 mg  Dose: 4 mg  Freq: EVERY 6 HOURS PRN Route: PO  PRN Reasons: nausea,vomiting  Start: 11/19/18 1522   Admin Instructions: This is Step 1 of nausea and vomiting management.  If nausea not resolved in 15 minutes, go to Step 2 prochlorperazine (COMPAZINE). Do not push through foil backing. Peel back foil and gently remove. Place on tongue immediately. Administration with liquid unnecessary  With dry hands, peel back foil backing and gently remove tablet; do not push oral disintegrating tablet through foil backing; administer immediately on tongue and oral disintegrating tablet dissolves in seconds; then swallow with saliva; liquid not required.    Admin. Amount: 1 tablet (1 × 4 mg tablet)  Dispense Loc: [facility]  POC: Post-procedure              Or  ondansetron (ZOFRAN) injection 4 mg  Dose: 4 mg  Freq: EVERY 6 HOURS PRN Route: IV  PRN Reasons: nausea,vomiting  Start: 11/19/18 1522   Admin Instructions: This is Step 1 of nausea and vomiting management.  If nausea not resolved in 15 minutes, go to Step 2 prochlorperazine (COMPAZINE).  Irritant. For ordered IV doses 0.1-4 mg, give IV Push undiluted over 2-5 minutes.    Admin. Amount: 4 mg = 2 mL Conc: 4 mg/2 mL  Dispense Loc: [facility]  Infused Over: 2-5 Minutes  Volume: 2 mL  POC: Post-procedure               ranitidine (ZANTAC) tablet 150 mg  Dose: 150 mg  Freq: 2 TIMES DAILY Route: PO  Start: 11/19/18 2000   Admin. Amount: 1 tablet (1 × 150 mg tablet)  Last Admin: 11/22/18 0846  Dispense Loc: [facility]  POC: Post-procedure        2110 (150 mg)-Given        0802 (150 mg)-Given       2123 (150  mg)-Given        0904 (150 mg)-Given       1938 (150 mg)-Given        0846 (150 mg)-Given       [ ] 2000           senna-docusate (SENOKOT-S;PERICOLACE) 8.6-50 MG per tablet 1 tablet  Dose: 1 tablet  Freq: 2 TIMES DAILY Route: PO  Start: 11/19/18 1522   Admin Instructions: If no bowel movement in 24 hours, increase to 2 tablets PO.  Hold for loose stools.    Admin. Amount: 1 tablet  Last Admin: 11/22/18 0847  Dispense Loc: [facility]  POC: Post-procedure        (2110)-Not Given        0802 (1 tablet)-Given       2123 (1 tablet)-Given               (1947)-Not Given [C]        0847 (1 tablet)-Given       [ ] 2000          Or  senna-docusate (SENOKOT-S;PERICOLACE) 8.6-50 MG per tablet 2 tablet  Dose: 2 tablet  Freq: 2 TIMES DAILY Route: PO  Start: 11/19/18 1522   Admin Instructions: Hold for loose stools.    Admin. Amount: 2 tablet  Last Admin: 11/21/18 0903  Dispense Loc: [facility]  POC: Post-procedure                               0903 (2 tablet)-Given                      [ ] 2000           sodium chloride (PF) 0.9% PF flush 3 mL  Dose: 3 mL  Freq: EVERY 8 HOURS Route: IK  Start: 11/19/18 1530   Admin Instructions: And Q1H PRN, to lock peripheral IV dormant line.    Admin. Amount: 3 mL  Last Admin: 11/22/18 0848  Dispense Loc: [facility] Floor Stock  Volume: 4 mL  POC: Post-procedure   Current Line: Peripheral IV 11/18/18 Left Upper forearm       (1544)-Not Given       (2245)-Not Given        0802 (3 mL)-Given       1631 (3 mL)-Given        0120 (3 mL)-Given       0905 (3 mL)-Given       1850 (3 mL)-Given        (0000)-Not Given       0848 (3 mL)-Given       [ ] 1600           sodium chloride (PF) 0.9% PF flush 3 mL  Dose: 3 mL  Freq: EVERY 1 HOUR PRN Route: IK  PRN Reason: line flush  PRN Comment: for peripheral IV flush post IV meds  Start: 11/19/18 1522   Admin. Amount: 3 mL  Dispense Loc: [facility] Floor Stock  Volume: 4 mL  POC: Post-procedure               traMADol (ULTRAM) tablet 50 mg  Dose: 50 mg  Freq: EVERY 4 HOURS  PRN Route: PO  PRN Reason: moderate pain  Start: 18 1154   Admin. Amount: 1 tablet (1 × 50 mg tablet)  Last Admin: 18 1334  Dispense Loc: [facility]          1334 (50 mg)-Given            traZODone (DESYREL) tablet 100 mg  Dose: 100 mg  Freq: AT BEDTIME Route: PO  Start: 18   Admin. Amount: 1 tablet (1 × 100 mg tablet)  Last Admin: 18  Dispense Loc: [facility]           (100 mg)-Given        [ ]            zolpidem (AMBIEN) tablet 5 mg  Dose: 5 mg  Freq: AT BEDTIME PRN Route: PO  PRN Reason: sleep  Start: 18   Admin Instructions: POD 1.  Do not give unless at least 6 hours of uninterrupted sleep is expected.    Admin. Amount: 1 tablet (1 × 5 mg tablet)  Dispense Loc:  [facility]  POC: Post-procedure              Future Medications  Medications 18       acetaminophen (TYLENOL) tablet 650 mg  Dose: 650 mg  Freq: EVERY 4 HOURS PRN Route: PO  PRN Reason: other  PRN Comment: multimodal surgical pain management along with NSAIDS and opioid medication as indicated based on pain control and physical function.  Start: 18 1600   Admin Instructions: May give first dose 4 hours after last scheduled dose of acetaminophen.  Maximum acetaminophen dose from all sources = 75 mg/kg/day not to exceed 4 grams/day.    Admin. Amount: 2 tablet (2 × 325 mg tablet)  Dispense Loc:  [facility]  POC: Post-procedure              Completed Medications  Medications 18         Dose: 1 g  Freq: EVERY 8 HOURS Route: IV  Indications of Use: PERIOPERATIVE PHARMACOPROPHYLAXIS  Start: 18 1600   End: 18 0029   Admin Instructions: First post-op dose due 8 hours after intra-op dose, see eMAR.    Admin. Amount: 1 g = 50 mL Conc: 1 g/50 mL  Last Admin: 18 1209  Dispense Loc:  Main Pharmacy  Infused Over: 30 Minutes  Administrations Remainin  Volume: 50  mL  POC: Post-procedure   Current Line: Peripheral IV 18 Left Upper forearm       1649 (1 g)-New Bag       2359 (1 g)-New Bag                Dose: 300 mg  Freq: 2 TIMES DAILY Route: PO  Start: 18   End: 18   Admin. Amount: 1 capsule (1 × 300 mg capsule)  Last Admin: 18  Dispense Loc: [facility]  Administrations Remainin         2233 (300 mg)-Given               Dose: 100 mg  Freq: AT BEDTIME Route: PO  Start: 18   End: 18   Admin. Amount: 1 tablet (1 × 100 mg tablet)  Last Admin: 18  Dispense Loc: [facility]  Administrations Remainin         2233 (100 mg)-Given            Discontinued Medications  Medications 18         Dose: 650 mg  Freq: EVERY 4 HOURS PRN Route: PO  PRN Reason: mild pain  Start: 18   End: 18   Admin Instructions: Alternate ibuprofen (if ordered) with acetaminophen.  Maximum acetaminophen dose from all sources = 75 mg/kg/day not to exceed 4 grams/day.    Admin. Amount: 2 tablet (2 × 325 mg tablet)  Dispense Loc: [facility]        0746-Auto Hold       1505-Unhold       1522-Med Discontinued            Dose: 2 puff  Freq: EVERY 6 HOURS PRN Route: IN  PRN Reason: wheezing  Start: 18   End: 18   Admin. Amount: 2 puff  Dispense Loc: [facility]        0746-Auto Hold       1505-Unhold       1522-Med Discontinued            Dose: 2.5 mg  Freq: EVERY 4 HOURS PRN Route: NEBULIZATION  PRN Reason: shortness of breath / dyspnea  Start: 18   End: 18 150   Admin. Amount: 2.5 mg = 3 mL Conc: 2.5 mg/3 mL  Dispense Loc: [facility] PAC[facility]  Volume: 3 mL  POC: PACU        1505-Med Discontinued            Dose: 2.5 mg  Freq: EVERY 4 HOURS PRN Route: NEBULIZATION  PRN Reason: wheezing  Start: 18   End: 18 1522   Admin. Amount: 2.5 mg = 3 mL Conc: 2.5 mg/3 mL  Dispense Loc:  [facility]  Volume: 3 mL         46-Auto Hold       1505-Unhold       1522-Med Discontinued            Dose: 15 mL  Freq: 4 TIMES DAILY PRN Route: SWISH & SPIT  PRN Reason: dry mouth  Start: 18   End: 18   Admin. Amount: 15 mL  Dispense Loc:  Main Pharmacy  Volume: 237 mL        0746-Auto Hold       1505-Unhold       1522-Med Discontinued            Dose: 20 mg  Freq: DAILY Route: PO  Start: 18   End: 18   Admin. Amount: 1 tablet (1 × 20 mg tablet)  Dispense Loc:  [facility]        46-Auto Hold       800 Auto Hold-Automatically Held       1505-Unhold       1522-Med Discontinued            Dose: 100 mg  Freq: 2 TIMES DAILY Route: PO  Start: 18   End: 18   Admin. Amount: 1 capsule (1 × 100 mg capsule)  Last Admin: 18  Dispense Loc:  [facility]  POC: Post-procedure         (100 mg)-Given        801 (100 mg)-Given       0957-Med Discontinued           Freq: PRN  Start: 18   End: 18   Last Admin: 18  POC: Intra-procedure        0957 (250 mL)-Given       1505-Med Discontinued            Dose: 2.5 mg  Freq: ONCE PRN Route: IV  PRN Reason: muscle spasms  Start: 18   End: 18 150   Admin Instructions: Vesicant. For ordered doses up to 20 mg, give IV Push undiluted. Administer each 5mg over 1 minute. See IV push link for additional instructions.    Admin. Amount: 2.5 mg = 0.5 mL Conc: 5 mg/mL  Dispense Loc:  Main Pharmacy  Administrations Remainin  Volume: 2 mL  POC: PACU        1505-Med Discontinued            Dose: 25 mg  Freq: ONCE PRN Route: IV  PRN Reason: sleep  PRN Comment: nausea  Start: 18   End: 18 150   Admin Instructions: Dilute in 10 ml 0.9% Sodium chloride.    Admin. Amount: 25 mg = 0.5 mL Conc: 50 mg/mL  Dispense Loc:  Main Pharmacy  Administrations Remainin  Volume: 1 mL  POC: PACU        1505-Med Discontinued            Dose: 30 mg  Freq: EVERY 24 HOURS Route: SC  Start:  11/20/18 0530   End: 11/21/18 0734   Admin Instructions: Check to make sure start date/time is 12-24 hours post op unless documented complication, AND no sooner than 22 hours post op if spinal anesthesia used.  Continue until discharge to home. HOLD if platelet count falls below 50% of baseline or less than 100,000/ L and notify provider.    Admin. Amount: 30 mg = 0.3 mL Conc: 30 mg/0.3 mL  Last Admin: 11/20/18 0544  Dispense Loc: [facility]  Volume: 0.3 mL  POC: Post-procedure         0544 (30 mg)-Given        0734-Med Discontinued  (0747)-Not Given [C]              Dose: 25-50 mcg  Freq: EVERY 2 MIN PRN Route: IV  PRN Reason: other  PRN Comment: acute pain  Start: 11/19/18 1121   End: 11/19/18 1505   Admin Instructions: MAX cumulative dose = 250 mcg.    Use Fentanyl initially, as a short acting agent for acute pain control.  If insufficient, or a longer acting agent is needed, begin Morphine or Hydromorphone if ordered.  For ordered IV doses 1-100 mcg give IV Push undiluted over a minimum of 3-5 minutes.    Admin. Amount: 25-50 mcg = 0.5-1 mL Conc: 50 mcg/mL  Last Admin: 11/19/18 1146  Dispense Loc: [facility] PACU  Volume: 2 mL  POC: PACU   Current Line: Peripheral IV 11/18/18 Left Upper forearm       1146 (50 mcg)-Given       1505-Med Discontinued            Dose: 325 mg  Freq: DAILY Route: PO  Start: 11/19/18 0800   End: 11/19/18 1522   Admin Instructions: Absorbed best on an empty stomach. If stomach upset occurs, can take with meals.    Admin. Amount: 1 tablet (1 × 325 mg tablet)  Dispense Loc: [facility]        0746-Auto Hold       0800 Auto Hold-Automatically Held       1505-Unhold       1522-Med Discontinued            Dose: 1 puff  Freq: DAILY Route: IN  Start: 11/19/18 0800   End: 11/19/18 1522   Admin Instructions: *  Rinse mouth after use.<br><br>  Auto-substituted for Symbicort 80/4.5 mcg 2 inhalations bid    Admin. Amount: 1 puff  Dispense Loc: [facility]        0746-Auto Hold       0800 Auto  Hold-Automatically Held       1505-Unhold       1522-Med Discontinued            Dose: 300 mg  Freq: 2 TIMES DAILY Route: PO  Start: 11/18/18 2215   End: 11/19/18 1522   Admin. Amount: 1 capsule (1 × 300 mg capsule)  Last Admin: 11/19/18 0011  Dispense Loc: [facility]        0011 (300 mg)-Given       0746-Auto Hold       0800 Auto Hold-Automatically Held       1505-Unhold       1522-Med Discontinued            Dose: 0.2 mg  Freq: EVERY 4 HOURS PRN Route: IV  PRN Reason: other  PRN Comment: pain control or improvement in physical function. Hold dose for analgesic side effects.  Start: 11/18/18 2159   End: 11/19/18 1522   Admin Instructions: Notify provider to assess for uncontrolled pain or analgesic side effects. Hold while on PCA or with regular IV opioid dosing  For ordered IV doses 0.1-4 mg give IV Push undiluted. Administer each 2mg over 2-5 minutes.    Admin. Amount: 0.2 mg  Last Admin: 11/19/18 0917  Dispense Loc:  [facility]   Current Line: Peripheral IV 11/18/18 Left Upper forearm      2214 (0.2 mg)-Given        0009 (0.2 mg)-Given       0746-Auto Hold       0917 (0.5 mg)-Given       1505-Unhold       1522-Med Discontinued            Dose: 0.3-0.5 mg  Freq: EVERY 5 MIN PRN Route: IV  PRN Reason: other  PRN Comment: acute pain.  May administer if Respiratory Rate is greater than 10  Start: 11/19/18 1121   End: 11/19/18 1505   Admin Instructions: If fentanyl is also ordered, use HYDROmorphone if pain control insufficient with fentanyl or a longer acting agent is needed.   Max cumulative dose = 2 mg  For ordered IV doses 0.1-4 mg give IV Push undiluted. Administer each 2mg over 2-5 minutes.    Admin. Amount: 0.3-0.5 mg  Dispense Loc: [facility] PACU  POC: PACU        1505-Med Discontinued            Dose: 10 mg  Freq: ONCE PRN Route: IV  PRN Reason: high blood pressure  PRN Comment: for Systemic Blood Pressure greater than 160 mmHg and Heart Rate greater than 60 bpm.    Start: 11/19/18 1121   End: 11/19/18 1506  "  Admin Instructions: For PACU USE ONLY.  DC WHEN TRANSFERRED TO FLOOR.  For ordered doses up to 80 mg, give IV Push undiluted. Give each 20 mg over 2 minutes.    Admin. Amount: 10 mg = 2 mL Conc: 5 mg/mL  Dispense Loc: [facility]  Infused Over: 2-8 Minutes  Administrations Remainin  Volume: 2 mL  POC: PACU        1505-Med Discontinued            Rate: 100 mL/hr   Freq: CONTINUOUS Route: IV  Start: 18 1130   End: 18 1505   Admin Instructions: Continue until IV catheter is weaned    Dispense Loc: [facility] Floor Stock  Volume: 1,000 mL  POC: PACU               1505-Med Discontinued            Rate: 75 mL/hr   Freq: CONTINUOUS Route: IV  Start: 18 0000   End: 18   Last Admin: 18 012  Dispense Loc: [facility] Floor Stock  Volume: 1,000 mL   Current Line: Peripheral IV 18 Left Upper forearm       0124 ( )-New Bag       1522-Med Discontinued            Dose: 75 mcg  Freq: DAILY Route: PO  Start: 18 08   End: 18   Admin Instructions: Separate oral administration of iron- or calcium-containing products and levothyroxine by at least 4 hours.    Admin. Amount: 1 tablet (1 × 75 mcg tablet)  Dispense Loc: [facility]        0746-Auto Hold       0800 Auto Hold-Automatically Held       1505-Unhold       1522-Med Discontinued            Freq: EVERY 1 HOUR PRN Route: Top  PRN Reason: pain  PRN Comment: with VAD insertion or accessing implanted port.  Start: 18   End: 18   Admin Instructions: Do NOT give if patient has a history of allergy to any local anesthetic or any \"juanjo\" product.   Apply 30 minutes prior to VAD insertion or port access.  MAX Dose:  2.5 g (  of 5 g tube)    Dispense Loc: [facility]        0746-Auto Hold       1505-Unhold       1522-Med Discontinued            Dose: 1 mL  Freq: EVERY 1 HOUR PRN Route: OTHER  PRN Comment: mild pain with VAD insertion or accessing implanted port  Start: 18   End: 18   Admin " "Instructions: Do NOT give if patient has a history of allergy to any local anesthetic or any \"juanjo\" product. MAX dose 1 mL subcutaneous OR intradermal in divided doses.    Admin. Amount: 1 mL  Dispense Loc: [facility] Floor Stock  Volume: 2 mL        0746-Auto Hold       1505-Unhold       1522-Med Discontinued            Dose: 1 mg  Freq: AT BEDTIME PRN Route: PO  PRN Reason: sleep  Start: 18   End: 18   Admin Instructions: Do not give unless at least 6 hours of uninterrupted sleep is expected.    Admin. Amount: 1 tablet (1 × 1 mg tablet)  Dispense Loc: [facility]        46-Auto Hold       1505-Unhold       1522-Med Discontinued            Dose: 12.5 mg  Freq: EVERY 5 MIN PRN Route: IV  PRN Comment: post anesthesia shivering if Respiratory Rate greater than 10  Start: 18   End: 18 150   Admin Instructions: Give IV Push undiluted. 10-40 mg over 2-3 minutes, up to 125 mg over 3-15 minutes    Admin. Amount: 12.5 mg = 0.25 mL Conc: 50 mg/mL  Dispense Loc: [facility] PACU  Administrations Remainin  Volume: 1 mL  POC: PACU        1505-Med Discontinued            Dose: 25 mg  Freq: DAILY Route: PO  Start: 18 08   End: 18   Admin Instructions: Hold for SBP < 110 or HR < 60. DO NOT CRUSH. Tablet may be split in half along score line.    Admin. Amount: 1 tablet (1 × 25 mg tablet)  Dispense Loc:  [facility]        46-Auto Hold       08 Auto Hold-Automatically Held       1505-Unhold       (1521)-Not Given       1522-Med Discontinued            Dose: 0.1-0.4 mg  Freq: EVERY 2 MIN PRN Route: IV  PRN Reason: opioid reversal  Start: 18   End: 18 1530   Admin Instructions: For apnea or imminent respiratory arrest: give 0.4 mg IV undiluted Q 2 minutes PRN until desired degree of reversal is obtained, stop opioid and notify provider. Continue monitoring until discharge criteria are met for a minimum of 2 hours.  For severe sedation, decrease in respiratory " depth, quality or respiratory rate less than 8: give 0.1 mg IV Q 2 minutes x 3 doses, stop opioid and notify provider.  Try to minimize reversal of analgesia especially in end-of-life patients  For ordered IV doses 0.1-2mg give IVP. Give each 0.4mg over 15 seconds in emergency situations. For non-emergent situations further dilute in 9mL of NS to facilitate titration of response.    Admin. Amount: 0.1-0.4 mg = 0.25-1 mL Conc: 0.4 mg/mL  Volume: 1 mL  POC: Post-procedure        1530-Med Discontinued            Dose: 0.1-0.4 mg  Freq: EVERY 2 MIN PRN Route: IV  PRN Reason: opioid reversal  Start: 11/18/18 2159   End: 11/19/18 1522   Admin Instructions: For respiratory rate LESS than or EQUAL to 8.  Partial reversal dose:  0.1 mg titrated q 2 minutes for Analgesia Side Effects Monitoring Sedation Level of 3 (frequently drowsy, arousable, drifts to sleep during conversation).Full reversal dose:  0.4 mg bolus for Analgesia Side Effects Monitoring Sedation Level of 4 (somnolent, minimal or no response to stimulation).  For ordered IV doses 0.1-2mg give IVP. Give each 0.4mg over 15 seconds in emergency situations. For non-emergent situations further dilute in 9mL of NS to facilitate titration of response.    Admin. Amount: 0.1-0.4 mg = 0.25-1 mL Conc: 0.4 mg/mL  Dispense Loc: [facility]  Volume: 1 mL        0746-Auto Hold       1505-Unhold       1522-Med Discontinued            Dose: 4 mg  Freq: EVERY 6 HOURS Route: IV  Start: 11/19/18 1530   End: 11/20/18 0957   Admin Instructions: Irritant. For ordered IV doses 0.1-4 mg, give IV Push undiluted over 2-5 minutes.    Admin. Amount: 4 mg = 2 mL Conc: 4 mg/2 mL  Last Admin: 11/19/18 1636  Dispense Loc: [facility]  Infused Over: 2-5 Minutes  Administrations Remaining: 3  Volume: 2 mL  POC: Post-procedure   Current Line: Peripheral IV 11/18/18 Left Upper forearm       1636 (4 mg)-Given       (2112)-Not Given [C]        (0241)-Not Given [C]       (0833)-Not Given        0957-Med Discontinued           Dose: 4 mg  Freq: EVERY 30 MIN PRN Route: PO  PRN Reason: nausea  Start: 18   End: 18 1505   Admin Instructions: MAX total dose = 8 mg, including OR dosing. If not resolved in 15 minutes, then go to step 2 [prochlorperazine (COMPAZINE), if ordered].  With dry hands, peel back foil backing and gently remove tablet; do not push oral disintegrating tablet through foil backing; administer immediately on tongue and oral disintegrating tablet dissolves in seconds; then swallow with saliva; liquid not required.    Admin. Amount: 1 tablet (1 × 4 mg tablet)  Dispense Loc: [facility] PACU  Administrations Remainin  POC: PACU        1505-Med Discontinued         Or    Dose: 4 mg  Freq: EVERY 30 MIN PRN Route: IV  PRN Reason: nausea  Start: 18   End: 18 1505   Admin Instructions: MAX total dose = 8 mg, including OR dosing. If not resolved in 15 minutes, then go to step 2 [prochlorperazine (COMPAZINE), if ordered].  Irritant. For ordered IV doses 0.1-4 mg, give IV Push undiluted over 2-5 minutes.    Admin. Amount: 4 mg = 2 mL Conc: 4 mg/2 mL  Dispense Loc: [facility] PACU  Infused Over: 2-5 Minutes  Administrations Remainin  Volume: 2 mL  POC: PACU        1505-Med Discontinued            Dose: 4 mg  Freq: EVERY 6 HOURS PRN Route: PO  PRN Reasons: nausea,vomiting  Start: 18   End: 18 1522   Admin Instructions: This is Step 1 of nausea and vomiting management.  If nausea not resolved in 15 minutes, go to Step 2 prochlorperazine (COMPAZINE). Do not push through foil backing. Peel back foil and gently remove. Place on tongue immediately. Administration with liquid unnecessary  With dry hands, peel back foil backing and gently remove tablet; do not push oral disintegrating tablet through foil backing; administer immediately on tongue and oral disintegrating tablet dissolves in seconds; then swallow with saliva; liquid not required.    Admin. Amount:  1 tablet (1 × 4 mg tablet)  Dispense Loc: [facility] MS6W        0746-Auto Hold              1505-Unhold       1522-Med Discontinued         Or    Dose: 4 mg  Freq: EVERY 6 HOURS PRN Route: IV  PRN Reasons: nausea,vomiting  Start: 11/18/18 2159   End: 11/19/18 1522   Admin Instructions: This is Step 1 of nausea and vomiting management.  If nausea not resolved in 15 minutes, go to Step 2 prochlorperazine (COMPAZINE).  Irritant. For ordered IV doses 0.1-4 mg, give IV Push undiluted over 2-5 minutes.    Admin. Amount: 4 mg = 2 mL Conc: 4 mg/2 mL  Last Admin: 11/19/18 0917  Dispense Loc: [facility] MS6W  Infused Over: 2-5 Minutes  Volume: 2 mL        0746-Auto Hold       0917 (4 mg)-Given       1505-Unhold       1522-Med Discontinued            Dose: 5 mg  Freq: EVERY 3 HOURS PRN Route: PO  PRN Reason: other  PRN Comment: pain control or improvement in physical function. Hold dose for analgesic side effects.  Start: 11/19/18 1522   End: 11/21/18 1154   Admin Instructions: Notify provider to assess for uncontrolled pain or analgesic side effects. Hold while on PCA or with regular IV opioid dosing.  Maximum total  is 40 mg in 24 hours.    Admin. Amount: 1 tablet (1 × 5 mg tablet)  Last Admin: 11/21/18 0717  Dispense Loc: [facility] MS6W  POC: Post-procedure         0015 (5 mg)-Given       0801 (5 mg)-Given        0717 (5 mg)-Given       1154-Med Discontinued          Dose: 5-10 mg  Freq: EVERY 3 HOURS PRN Route: PO  PRN Reason: other  PRN Comment: pain control or improvement in physical function. Hold dose for analgesic side effects.  Start: 11/18/18 2159   End: 11/19/18 1522   Admin Instructions: Start with the lowest dose.  May adjust dose by 5 mg every 3 hours as needed. Notify provider to assess for uncontrolled pain or analgesic side effects. Hold while on PCA or with regular IV opioid dosing.    Admin. Amount: 1-2 tablet (1-2 × 5 mg tablet)  Last Admin: 11/19/18 0640  Dispense Loc: [facility] MS6W        0046 (5 mg)-Given        0340 (5 mg)-Given       0640 (5 mg)-Given       0746-Auto Hold       1505-Unhold       1522-Med Discontinued            Dose: 17 g  Freq: DAILY PRN Route: PO  PRN Reason: constipation  Start: 11/18/18 2159   End: 11/19/18 1522   Admin Instructions: Give in 8oz of  water, juice, or soda. Hold for loose stools.  This is the second step of a three step constipation treatment.  1 Packet = 17 grams. Mixed prescribed dose in 8 ounces of water. Follow with 8 oz. of water.    Admin. Amount: 17 g  Dispense Loc: [facility] MS6W        0746-Auto Hold       1505-Unhold       1522-Med Discontinued            Dose: 5 mg  Freq: EVERY 6 HOURS PRN Route: IV  PRN Reasons: nausea,vomiting  Start: 11/19/18 1121   End: 11/19/18 1505   Admin Instructions: This is Step 2 of the nausea and vomiting protocol.   If nausea not resolved in 15 minutes, give metoclopramide (REGLAN) if ordered (step 3 of nausea and vomiting protocol)  For ordered IV doses 0.1-10 mg, give IV Push undiluted. Each 5mg over 1 minute.    Admin. Amount: 5 mg = 1 mL Conc: 5 mg/mL  Dispense Loc: [facility] PACU  Infused Over: 1-2 Minutes  Volume: 1 mL  POC: PACU        1505-Med Discontinued            Dose: 5 mg  Freq: EVERY 6 HOURS PRN Route: IV  PRN Reasons: nausea,vomiting  Start: 11/18/18 2159   End: 11/19/18 1522   Admin Instructions: This is Step 2 of nausea and vomiting management. Give if nausea not resolved 15 minutes after giving ondansetron (ZOFRAN). If nausea not resolved in 15 minutes, go to Step 3 metoclopramide (REGLAN), if ordered.  For ordered IV doses 0.1-10 mg, give IV Push undiluted. Each 5mg over 1 minute.    Admin. Amount: 5 mg = 1 mL Conc: 5 mg/mL  Dispense Loc: [facility] MS6W  Infused Over: 1-2 Minutes  Volume: 1 mL        0746-Auto Hold       1505-Unhold       1522-Med Discontinued         Or    Dose: 5 mg  Freq: EVERY 6 HOURS PRN Route: PO  PRN Reason: vomiting  Start: 11/18/18 2159   End: 11/19/18 1522   Admin Instructions: This is Step 2 of nausea and  vomiting management. Give if nausea not resolved 15 minutes after giving ondansetron (ZOFRAN). If nausea not resolved in 15 minutes, go to Step 3 metoclopramide (REGLAN), if ordered.    Admin. Amount: 1 tablet (1 × 5 mg tablet)  Dispense Loc: [facility] MS6W        0746-Auto Hold       1505-Unhold       1522-Med Discontinued         Or    Dose: 12.5 mg  Freq: EVERY 12 HOURS PRN Route: RE  PRN Reasons: nausea,vomiting  Start: 11/18/18 2159   End: 11/19/18 1522   Admin Instructions: This is Step 2 of nausea and vomiting management. Give if nausea not resolved 15 minutes after giving ondansetron (ZOFRAN). If nausea not resolved in 15 minutes, go to Step 3 metoclopramide (REGLAN), if ordered.    Admin. Amount: 0.5 suppository (0.5 × 25 mg suppository)  Dispense Loc: [facility] MS6W        0746-Auto Hold       1505-Unhold       1522-Med Discontinued            Dose: 1 tablet  Freq: 2 TIMES DAILY PRN Route: PO  PRN Reason: constipation  Start: 11/18/18 2159   End: 11/19/18 1522   Admin Instructions: If no bowel movement in 24 hours, increase to 2 tablets PO.  Hold for loose stools.  This is the first step of a three step constipation treatment.    Admin. Amount: 1 tablet  Dispense Loc: [facility] MS6W        0746-Auto Hold       1505-Unhold       1522-Med Discontinued         Or    Dose: 2 tablet  Freq: 2 TIMES DAILY PRN Route: PO  PRN Reason: constipation  Start: 11/18/18 2159   End: 11/19/18 1522   Admin Instructions: Hold for loose stools.  This is the first step of a three step constipation treatment.    Admin. Amount: 2 tablet  Dispense Loc: [facility] MS6W        0746-Auto Hold       1505-Unhold       1522-Med Discontinued            Dose: 3 mL  Freq: EVERY 8 HOURS Route: IK  Start: 11/18/18 2215   End: 11/19/18 1522   Admin Instructions: And Q1H PRN, to lock peripheral IV dormant line.    Admin. Amount: 3 mL  Last Admin: 11/18/18 2254  Dispense Loc: [facility] Floor Stock  Volume: 4 mL   Current Line: Peripheral IV 11/18/18 Left Upper  forearm      2254 (3 mL)-Given        0746-Auto Hold       1415 Auto Hold-Automatically Held       1505-Unhold              1522-Med Discontinued            Dose: 3 mL  Freq: EVERY 1 HOUR PRN Route: IK  PRN Reason: line flush  PRN Comment: for peripheral IV flush post IV meds  Start: 11/18/18 2159   End: 11/19/18 1522   Admin. Amount: 3 mL  Dispense Loc: [facility] Floor Stock  Volume: 4 mL        0746-Auto Hold       1505-Unhold       1522-Med Discontinued            Freq: PRN  Start: 11/19/18 0958   End: 11/19/18 1505   Last Admin: 11/19/18 0958  POC: Intra-procedure        0958 (3,000 mL)-Given       1505-Med Discontinued            Rate: 100 mL/hr   Freq: CONTINUOUS Route: IV  Last Dose: Stopped (11/20/18 0758)  Start: 11/19/18 1530   End: 11/20/18 0957   Admin Instructions: Change to saline lock when PO well tolerated.    Last Admin: 11/20/18 0205  Dispense Loc: [facility] Floor Stock  Volume: 1,000 mL  POC: Post-procedure   Current Line: Peripheral IV 11/18/18 Left Upper forearm       1523 ( )-New Bag       2114 ( )-Rate/Dose Verify        0205 ( )-New Bag       0758-Stopped       0957-Med Discontinued           Dose: 100 mg  Freq: AT BEDTIME Route: PO  Start: 11/18/18 2215   End: 11/19/18 1522   Admin. Amount: 1 tablet (1 × 100 mg tablet)  Last Admin: 11/19/18 0011  Dispense Loc:  [facility] MS6W        0011 (100 mg)-Given       0746-Auto Hold       1505-Unhold       1522-Med Discontinued       Medications 11/16/18 11/17/18 11/18/18 11/19/18 11/20/18 11/21/18 11/22/18               INTERAGENCY TRANSFER FORM - NOTES (H&P, Discharge Summary, Consults, Procedures, Therapies)   11/18/2018                       [facility]: [phone]               History & Physicals      H&P by Joselin Carrasco PA-C at 11/18/2018  8:52 PM     Author:  Joselin Carrasco PA-C Service:  Hospitalist Author Type:  Physician Assistant - C    Filed:  11/18/2018 10:44 PM Date of Service:  11/18/2018  8:52 PM Creation Time:   11/18/2018  8:52 PM    Status:  Attested :  Joselin Carrasco PA-C (Physician Assistant - C)    Cosigner:  Wenceslao Craig DO at 11/18/2018 10:57 PM        Attestation signed by Wenceslao Craig DO at 11/18/2018 10:57 PM        Physician Attestation   I, Wenceslao Craig, have reviewed and discussed with the advanced practice provider their history, physical and plan for Marie Kline. I did not participate in a shared visit by interviewing or examining the patient and this should be billed as an advanced practice provider only visit.    Wenceslao Craig  Date of Service (when I saw the patient): I did not personally see this patient today.                               [facility]    Hospitalist History and Physical    Name: Marie Kline    MRN: [id number]  YOB: 1932    Age: 86 year old  Date of Admission:  11/18/2018  Date of Service (when I saw the patient):[EM1.1] 11/18/18[EM1.2]    Assessment & Plan   Marie Kline is a 86 year old female with a PMH significant for CLL, bladder cancer, stress cardiomyopathy 10/2014 with complete resolution (EF 60-65% echo 3/2017), COPD, hypothyroidism, HLD, depression who presents for evaluation of hip pain after a fall.    1. Left femoral neck fracture secondary to mechanical fall:[EM1.1] Patient was going into her living room to make a phone call and while walking in the dark ran into the glass coffee table and tripped over it, scraping her shins and landing on her left hip. She thinks she heard a crack when she fell. She was unable to get up or bear any weight since that injury and activated her Life Alert to be brought to the ER. ER physician contacted orthopedics who plans to place her on the OR schedule for tomorrow.  -Pre-op EKG and type & screen ordered[EM1.3]  -[EM1.1]Ortho consult  -NPO at midnight  -IVF 75 ml/hr x 10 hours starting at midnight  -Prn analgesics and antiemetics    2. Lower extremity skin tears: Large skin  tears over shins bilaterally, bandaged with steri-strips by ER physician. No active bleeding.    3. Right inguinal pain/swelling: In setting of previous excisional biopsy for right inguinal adenopathy performed by Dr. De Paz, done at request of Dr. Ivory to assess if progression or transformation of her disease. Not clear what results showed/indication but patient reports she discussed with Dr. Ivory. ER physician ordered RLQ ultrasound to evaluate the surgical site, though it appears to be healing well; ultrasound pending at time of admission with no acute concerns.   -AM rounder to follow up on results of ultrasound    4. CLL: At least stage III per oncology notes. Diagnosed in 4/2007 and has been receiving IVIG every 4 weeks, last infusion was 10/31. Has chronic high white count. Has persistent right inguinal adenopathy as well as mediastinal and cervical adenopathy. Excisional biopsy of right inguinal adenopathy was completed 10/23 to assess if progression or transformation of her disease. Dr. Ivory has recommended chemotherapy but patient has chosen forgo doing this until after the holiday season[EM1.3], so they are re-visiting this in the next calendar year.[EM1.4]    5. Bladder cancer: Having BCG treatment w/ Dr. Nuñez. F/u cysto 5/2016 without evidence of recurrence so receives 3 weekly doses of BCG maintenance therapy. 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 She gets cystos every 6 months for monitoring.    6. History of stress-induced cardiomyopathy: History of stress cardiomyopathy 10/2014 in setting of selling her home (dream home where she lived with her ) with subsequent resolution of wall motion abnormalities and normalization of LV function. Coronary angiogram at that time showed normal coronary arteries. Has followed w/ Dr. Ortega in the past and remains on beta blocker therapy. Echo 3/2017 showed EF of 60-65%.  -Continue PTA metoprolol succinate 25  mg daily  -Hold lasix while on IVF and NPO    7. COPD: Former smoker of 60 pack years, quit in 2009. Worsening of chronic cough with more production than her baseline. Using an albuterol nebulizer, Breo, and Ventolin inhaler routinely. Has not established care with a pulmonologist yet, but did receive a referral from oncology clinic to do so.[EM1.3] Not on home O2 at baseline.[EM1.4]  -Continue PTA albuterol 2 puffs Q6H prn for wheezing, BID Symbicort.  -Prn duonebs for wheezing/sob    8. Hypothyroidism: Continue levothyroxine 75 mcg daily.    9. HLD: Continue atorvastatin 20 mg daily.    10. Insomnia[EM1.3]: Continue PTA trazodone.    DVT Prophylaxis:[EM1.1] Pneumatic Compression Devices[EM1.3]  Code Status:[EM1.1] Full Code[EM1.3]  Disposition:[EM1.1] Anticipate > 2 evening stay[EM1.3]    Primary Care Physician   Piper Kiser    Chief Complaint   Hip pain after a fall    History is obtained from the patient.    History of Present Illness   Marie Kline is a 86 year old female with a PMH significant for CLL, bladder cancer, stress cardiomyopathy 10/2014 with complete resolution (EF 60-65% echo 3/2017), COPD, hypothyroidism, HLD, depression who presents for evaluation of hip pain after a fall.[EM1.1]    The patient reports that she was walking into her living room in the dark to make a phone call and walked into/tripped over a glass coffee table. She scraped her shins on the table during her fall and landed on her left hip, hearing a crack. She did not strike her head, lose consciousness, or sustain any other injuries to her knowledge. She was unable to get up so she crawled to the phone to call her daughter, who told her to activate her Life Alert device. EMS transported her to the ER. She denied recent fever, chills, change in her chronic cough, wheezing, shortness of breath, chest pain, palpitations, nausea, vomiting, abdominal pain, diarrhea, or dysuria.[EM1.3]    Upon arrival in the Emergency Department,  initial vital signs were[EM1.1] stable[EM1.3]. ED workup notable for[EM1.1] CBC w/ diff showed leukocytosis of 73.5 (chronically elevated WBC count related to CLL), hgb 12, and thrombocytopenia of 72[EM1.3].[EM1.1] XR of pelvis and left hip showed femoral neck fracture[EM1.3]. The patient received[EM1.1] 500 ml IV NS, 0.7 mg IV dilaudid, and 4 mg IV zofran[EM1.3] in the Emergency Department[EM1.1].[EM1.3]    Past Medical History    Past Medical History:   Diagnosis Date     Arthritis     Generalized     Bladder cancer (H)      Bladder cancer (H)      Cardiomyopathy (H)      CLL (chronic lymphocytic leukemia) (H)      Congestive heart failure (H) 10/24/2014    flash pulm edema ass w/Takotsubo     COPD (chronic obstructive pulmonary disease) (H)     noc O2     Hypertension      Hypothyroid      Mumps      Myocardial infarction (H) 10/2014    Takotsubo presentation w/mild CAD     Pulmonary edema cardiac cause (H) 10/08/2014    associated w/Takotsubo ACS     Tricuspid valve disorders, 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    TR 2-3+ per echo         Past Surgical History   Past Surgical History:   Procedure Laterality Date     BIOPSY LYMPH NODE INGUINAL Right 10/23/2018    Procedure: excsional biopsy right inguinal lymph node;  Surgeon: Reji Connors MD;  Location: [facility] OR     BLADDER SURGERY       CORONARY ANGIOGRAPHY ADULT ORDER  10/2014    minimal CAD     CYSTOSCOPY       CYSTOSCOPY, BIOPSY BLADDER, COMBINED N/A 2/9/2016    Procedure: COMBINED CYSTOSCOPY, BIOPSY BLADDER;  Surgeon: Kar Nuñez MD;  Location:  OR     CYSTOSCOPY, TRANSURETHRAL RESECTION (TUR) TUMOR BLADDER, COMBINED N/A 2/9/2016    Procedure: COMBINED CYSTOSCOPY, TRANSURETHRAL RESECTION (TUR) TUMOR BLADDER;  Surgeon: Kar Nuñez MD;  Location: [facility] OR     ESOPHAGOSCOPY, GASTROSCOPY, DUODENOSCOPY (EGD), COMBINED N/A 6/22/2016    Procedure: COMBINED ESOPHAGOSCOPY, GASTROSCOPY, DUODENOSCOPY (EGD);  Surgeon: Freddie Diez MD;   Location:  GI       Prior to Admission Medications   Prior to Admission Medications   Prescriptions Last Dose Informant Patient Reported? Taking?   Multiple Vitamins-Minerals (MULTIVITAMIN GUMMIES ADULT PO)   Yes No   albuterol (PROAIR HFA/PROVENTIL HFA/VENTOLIN HFA) 108 (90 BASE) MCG/ACT Inhaler   No No   Sig: Inhale 2 puffs into the lungs every 6 hours as needed for wheezing   atorvastatin (LIPITOR) 20 MG tablet   No No   Sig: TAKE ONE TABLET BY MOUTH ONE TIME DAILY    budesonide-formoterol (SYMBICORT) 80-4.5 MCG/ACT Inhaler   No No   Sig: Inhale 2 puffs into the lungs 2 times daily   denosumab (PROLIA) 60 MG/ML SOLN injection   No No   Sig: Inject 1 mL (60 mg) Subcutaneous every 6 months   ferrous sulfate (IRON) 325 (65 FE) MG tablet   Yes No   Sig: Take by mouth daily (with breakfast)   furosemide (LASIX) 20 MG tablet   No No   Sig: TAKE ONE TABLET BY MOUTH ONE TIME DAILY    gabapentin (NEURONTIN) 300 MG capsule   No No   Sig: Take 1 capsule (300 mg) by mouth daily as needed   Patient taking differently: Take 300 mg by mouth 2 times daily    levothyroxine (SYNTHROID/LEVOTHROID) 75 MCG tablet   No No   Sig: TAKE ONE TABLET BY MOUTH ONE TIME DAILY    metoprolol succinate (TOPROL-XL) 25 MG 24 hr tablet   No No   Sig: Take 1 tablet (25 mg) by mouth daily   order for DME   No No   Sig: Equipment being ordered: Nebulizer  Fax order to [facility] [phone]   traZODone (DESYREL) 50 MG tablet   No No   Sig: Take 1 tablet (50 mg) by mouth nightly as needed for sleep   Patient taking differently: Take 100 mg by mouth nightly as needed for sleep       Facility-Administered Medications: None     Allergies   Allergies   Allergen Reactions     Diagnostic X-Ray Materials Hives     CT DYE     Contrast Dye Hives     CT DYE     Wound Dressing Adhesive        Social History   Social History   Substance Use Topics     Smoking status: Former Smoker     Types: Cigarettes     Quit date: 2/3/1996     Smokeless tobacco:  Never Used     Alcohol use No     Social History     Social History Narrative       Family History[EM1.1]   Family history reviewed with patient and is noncontributory.[EM1.3]    Review of Systems   A Comprehensive greater than 10 system review of systems was carried out.  Pertinent positives and negatives are noted above.  Otherwise negative for contributory information.    Physical Exam   Temp: 97.8  F (36.6  C) Temp src: Oral BP: 96/71 Pulse: 81   Resp: 18 SpO2: 91 % O2 Device: None (Room air)    Vital Signs with Ranges  Temp:  [97.8  F (36.6  C)] 97.8  F (36.6  C)  Pulse:  [81] 81  Resp:  [18] 18  BP: ()/(71-86) 96/71  SpO2:  [88 %-91 %] 91 %  104 lbs 0 oz    GEN:  Alert, oriented x 3, appears comfortable, no overt distress  HEENT:  Normocephalic/atraumatic, no scleral icterus, no nasal discharge, mouth moist.  CV:  Regular rate and rhythm, no murmur or JVD.  S1 + S2 noted, no S3 or S4.  LUNGS:  Clear to auscultation bilaterally without rales/rhonchi/wheezing/retractions.  Symmetric chest rise on inhalation noted.  ABD:  Active bowel sounds, soft, non-tender/non-distended.  No rebound/guarding/rigidity.  EXT:[EM1.1]  Tenderness to palpation over left hip.[EM1.3] No edema.  No cyanosis.  No acute joint synovitis noted.  SKIN:  Dry to touch, no exanthems noted in the visualized areas.  NEURO:  Symmetric muscle strength, sensation to touch grossly intact.  Coordination symmetric on general exam.  No new focal deficits appreciated.    Data   Data reviewed today:      Results for orders placed or performed during the hospital encounter of 11/18/18 (from the past 48 hour(s))   CBC with platelets differential   Result Value Ref Range    WBC 73.5 (HH) 4.0 - 11.0 10e9/L    RBC Count 3.97 3.8 - 5.2 10e12/L    Hemoglobin 12.0 11.7 - 15.7 g/dL    Hematocrit 38.2 35.0 - 47.0 %    MCV 96 78 - 100 fl    MCH 30.2 26.5 - 33.0 pg    MCHC 31.4 (L) 31.5 - 36.5 g/dL    RDW 16.9 (H) 10.0 - 15.0 %    Platelet Count 79 (L) 150 -  450 10e9/L    Diff Method Manual Differential     % Neutrophils 5.0 %    % Lymphocytes 87.0 %    % Monocytes 8.0 %    % Eosinophils 0.0 %    % Basophils 0.0 %    Absolute Neutrophil 3.7 1.6 - 8.3 10e9/L    Absolute Lymphocytes 63.9 (H) 0.8 - 5.3 10e9/L    Absolute Monocytes 5.9 (H) 0.0 - 1.3 10e9/L    Absolute Eosinophils 0.0 0.0 - 0.7 10e9/L    Absolute Basophils 0.0 0.0 - 0.2 10e9/L    Anisocytosis Slight     Elliptocytes Slight     Platelet Estimate       Automated count confirmed.  Platelet morphology is normal.   Basic metabolic panel   Result Value Ref Range    Sodium 140 133 - 144 mmol/L    Potassium 4.3 3.4 - 5.3 mmol/L    Chloride 103 94 - 109 mmol/L    Carbon Dioxide 33 (H) 20 - 32 mmol/L    Anion Gap 4 3 - 14 mmol/L    Glucose 105 (H) 70 - 99 mg/dL    Urea Nitrogen 11 7 - 30 mg/dL    Creatinine 1.04 0.52 - 1.04 mg/dL    GFR Estimate 50 (L) >60 mL/min/1.7m2    GFR Estimate If Black 61 >60 mL/min/1.7m2    Calcium 8.2 (L) 8.5 - 10.1 mg/dL   XR Pelvis and Hip Left 2 Views    Narrative    PELVIS WITH UNILATERAL HIP TWO VIEWS LEFT  11/18/2018 7:41 PM     HISTORY: Fall, left hip pain.     COMPARISON: None.      Impression    IMPRESSION: Left femoral neck fracture.    MD Joselin FRASER PA-C[EM1.1]       Revision History        User Key Date/Time User Provider Type Action    > EM1.4 11/18/2018 10:44 PM Joselin Carrasco PA-C Physician Assistant - QUIN Sign     EM1.3 11/18/2018 10:23 PM Joselin Carrasco PA-C Physician Assistant Suresh TSAI Sign     EM1.2 11/18/2018  8:53 PM Joselin Carrasco PA-C Physician Assistant - C      EM1.1 11/18/2018  8:52 PM Joselin Carrasco PA-C Physician Assistant - C                      Discharge Summaries      Discharge Summaries by Wenceslao Craig DO at 11/21/2018  8:45 AM     Author:  Wenceslao Craig DO Service:  (none) Author Type:  Physician    Filed:  11/21/2018 12:46 PM Date of Service:  11/21/2018  8:45 AM Creation Time:  11/21/2018  8:44 AM    Status:   Signed :  Wenceslao Craig DO (Physician)         Hospitalist Discharge Summary  [facility]    Marie Kline MRN# [id number]   YOB: 1932 Age: 86 year old     Date of Admission:  11/18/2018  Date of Discharge:[JK1.1]  11/21/2018[JK1.2]  Admitting Physician:  Scar Reynolds MD  Discharge Physician:  Wenceslao Craig  Discharging Service:  Hospitalist     Primary Provider: Piper Kiser          Discharge Diagnosis:[JK1.1]   Left femoral neck fracture status post repair  Mechanical fall  CLL  Bladder cancer  History of stress cardiomyopathy  COPD  Depression  Hyperlipidemia  Hypothyroidism  Blood loss anemia  Postoperative hypotension[JK1.2]             Discharge Disposition:[JK1.1]   Discharged to rehabilitation facility[JK1.2]           Allergies:[JK1.1]   Allergies   Allergen Reactions     Diagnostic X-Ray Materials Hives     CT DYE     Contrast Dye Hives     CT DYE     Wound Dressing Adhesive[JK1.3]               Discharge Medications:   Current Discharge Medication List      START taking these medications    Details   acetaminophen (TYLENOL) 325 MG tablet Take 2 tablets (650 mg) by mouth every 4 hours as needed for mild pain  Qty: 40 tablet, Refills: 0    Associated Diagnoses: Fracture of hip, left, closed, initial encounter (H)      aspirin 325 MG EC tablet Take 1 tablet (325 mg) by mouth daily  Qty: 45 tablet, Refills: 0    Associated Diagnoses: Fracture of hip, left, closed, initial encounter (H)      ondansetron (ZOFRAN-ODT) 4 MG ODT tab Take 1 tablet (4 mg) by mouth every 6 hours as needed for nausea or vomiting  Qty: 20 tablet, Refills: 0    Associated Diagnoses: Fracture of hip, left, closed, initial encounter (H)      oxyCODONE IR (ROXICODONE) 5 MG tablet Take 1 tablet (5 mg) by mouth every 4 hours as needed for moderate to severe pain  Qty: 30 tablet, Refills: 0    Associated Diagnoses: Fracture of hip, left, closed, initial encounter (H)       senna-docusate (SENOKOT-S;PERICOLACE) 8.6-50 MG per tablet Take 2 tablets by mouth 2 times daily  Qty: 40 tablet, Refills: 0    Associated Diagnoses: Fracture of hip, left, closed, initial encounter (H)         CONTINUE these medications which have NOT CHANGED    Details   albuterol (2.5 MG/3ML) 0.083% neb solution Take 2.5 mg by nebulization 2 times daily      albuterol (PROAIR HFA/PROVENTIL HFA/VENTOLIN HFA) 108 (90 BASE) MCG/ACT Inhaler Inhale 2 puffs into the lungs every 6 hours as needed for wheezing  Qty: 1 Inhaler, Refills: 8    Associated Diagnoses: Intermittent asthma without complication      atorvastatin (LIPITOR) 20 MG tablet TAKE ONE TABLET BY MOUTH ONE TIME DAILY   Qty: 90 tablet, Refills: 1    Associated Diagnoses: ACS (acute coronary syndrome) (H); Hyperlipidemia with target LDL less than 130      budesonide-formoterol (SYMBICORT) 80-4.5 MCG/ACT Inhaler Inhale 2 puffs into the lungs 2 times daily  Qty: 3 Inhaler, Refills: 1    Associated Diagnoses: COPD exacerbation (H)      denosumab (PROLIA) 60 MG/ML SOLN injection Inject 1 mL (60 mg) Subcutaneous every 6 months  Qty: 1 mL, Refills: 0    Associated Diagnoses: Senile osteoporosis; CKD (chronic kidney disease) stage 3, GFR 30-59 ml/min (H)      ferrous sulfate (IRON) 325 (65 FE) MG tablet Take by mouth daily (with breakfast)      furosemide (LASIX) 20 MG tablet TAKE ONE TABLET BY MOUTH ONE TIME DAILY   Qty: 90 tablet, Refills: 1    Associated Diagnoses: Localized edema      gabapentin (NEURONTIN) 300 MG capsule Take 300 mg by mouth 2 times daily      levothyroxine (SYNTHROID/LEVOTHROID) 75 MCG tablet TAKE ONE TABLET BY MOUTH ONE TIME DAILY   Qty: 90 tablet, Refills: 1    Associated Diagnoses: Other specified hypothyroidism      metoprolol succinate (TOPROL-XL) 25 MG 24 hr tablet Take 1 tablet (25 mg) by mouth daily  Qty: 90 tablet, Refills: 3    Associated Diagnoses: ACS (acute coronary syndrome) (H)      Multiple Vitamins-Minerals (MULTIVITAMIN  GUMMIES ADULT PO) Take by mouth daily       traZODone (DESYREL) 50 MG tablet Take 100 mg by mouth At Bedtime      order for DME Equipment being ordered: Nebulizer  Fax order to [facility] [phone]  Qty: 1 each, Refills: 0    Associated Diagnoses: COPD exacerbation (H)                    Condition on Discharge:   Discharge condition:[JK1.1] Stable[JK1.2]   Discharge vitals:[JK1.1] Blood pressure 114/50, pulse 64, temperature 97  F (36.1  C), resp. rate 16, weight 47.2 kg (104 lb), SpO2 94 %, not currently breastfeeding.[JK1.3]   Code status on discharge:[JK1.1] Full Code[JK1.2]      BASIC PHYSICAL EXAMINATION:  GENERAL: No apparent distress.  CARDIOVASCULAR: Regular rate and rhythm without murmurs.  PULMONARY: Clear to auscultation bilaterally.   GASTROINTESTINAL: Abdomen soft, non-tender.  EXTREMITIES: No edema, pulses intact.  NEUROLOGIC: No focal deficits.            History of Illness:   See detailed admission note for full details.               Procedures excluding imaging which is summarized below:   Please see details in the electronic medical record.           Consultations:[JK1.1]   ORTHOPEDIC SURGERY IP CONSULT  HOSPITALIST IP CONSULT  OCCUPATIONAL THERAPY ADULT IP CONSULT  PHYSICAL THERAPY ADULT IP CONSULT  PHYSICAL THERAPY ADULT IP CONSULT  OCCUPATIONAL THERAPY ADULT IP CONSULT  CARE TRANSITION RN/SW IP CONSULT[JK1.3]          Significant Results:[JK1.1]   Results for orders placed or performed during the hospital encounter of 11/18/18   XR Pelvis and Hip Left 2 Views    Narrative    PELVIS WITH UNILATERAL HIP TWO VIEWS LEFT  11/18/2018 7:41 PM     HISTORY: Fall, left hip pain.     COMPARISON: None.      Impression    IMPRESSION: Left femoral neck fracture.    TANISHA PRIETO MD   XR Pelvis w Hip Port Left 1 View    Narrative    PELVIS AND HIP PORTABLE LEFT ONE VIEW   11/19/2018 11:52 AM     HISTORY: Postoperative.     COMPARISON: 11/18/2018      Impression    IMPRESSION: Interval left total  hip replacement. No fracture or  dislocation. Right hip unremarkable.    PADMINI MOULTON MD[JK1.4]       Transthoracic Echocardiogram Results:  No results found for this or any previous visit (from the past 4320 hour(s)).             Pending Results:[JK1.1]   Unresulted Labs Ordered in the Past 30 Days of this Admission     Date and Time Order Name Status Description    10/23/2018 1139 Chromosome malignant tissue In process[JK1.3]                       Discharge Instructions and Follow-Up:   Discharge instructions and follow-up:[JK1.1]   Discharge Procedure Orders  General info for SNF   Order Comments: Length of Stay Estimate: Short Term Care: Estimated # of Days <30  Condition at Discharge: Improving  Level of care:skilled   Rehabilitation Potential: Good  Admission H&P remains valid and up-to-date: Yes  Recent Chemotherapy: N/A  Use Nursing Home Standing Orders: Yes     Mantoux instructions   Order Comments: Give two-step Mantoux (PPD) Per Facility Policy Yes     Reason for your hospital stay   Order Comments: Left femoral neck fracture, left hip hemiarthroplasty     Wound care   Order Comments: Site:   Left hip  Instructions:  Leave dressing intact if Aquacel and remove at POD #7, remove staples POD #14  Daily dressing changes with gauze and tape     Follow Up and recommended labs and tests   Order Comments: Follow up with Dr. Reynolds in 2 weeks.  No follow up labs or test are needed.  Call for appointment [phone]     Activity - Up with assistive device   Order Specific Question Answer Comments   Is discharge order? Yes      Weight bearing status     Physical Therapy Adult Consult   Order Comments: Evaluate and treat as clinically indicated.    Reason:  left hip hemiarthroplasty     Occupational Therapy Adult Consult   Order Comments: Evaluate and treat as clinically indicated.    Reason:  left hip hemiarthroplasty     Hip precautions     Fall precautions     Advance Diet as Tolerated   Order Comments:  Follow this diet upon discharge: Orders Placed This Encounter     Advance Diet as Tolerated: Regular Diet Adult   Order Specific Question Answer Comments   Is discharge order? Yes[JK1.3]                Hospital Course:[JK1.1]   Summary of Stay: Marie Kline is a 86 year old female with a PMH significant for CLL, bladder cancer, stress cardiomyopathy 10/2014 with complete resolution (EF 60-65% echo 3/2017), COPD, hypothyroidism, HLD, depression who presented for evaluation of hip pain after a fall.  Underwent operative repair this afternoon with reported 100 cc of blood loss.  Was hypotensive throughout the procedure which has continued since reaching the floor but now resolved.  She currently feels well and has no complaints.  She is recovering well and will discharge to TCU today.  She has mild hypoxia and will discharge with 1 L of supplemental oxygen.  This is in the context of being elderly with recent surgery and likely atelectasis with underlying history of tobacco dependence and COPD not in exacerbation.      1. Left femoral neck fracture secondary to mechanical fall: Patient was going into her living room to make a phone call and while walking in the dark ran into the glass coffee table and tripped over it, scraping her shins and landing on her left hip. She thinks she heard a crack when she fell. She was unable to get up or bear any weight since that injury and activated her Life Alert to be brought to the ER.  Underwent operative repair 11/19, she was hypotensive the evening following surgery but this is now improved and she is doing remarkably well.      2. Lower extremity skin tears: Large skin tears over shins bilaterally, bandaged with steri-strips by ER physician. No active bleeding.      3. Right inguinal pain/swelling: In setting of previous excisional biopsy for right inguinal adenopathy performed by Dr. De Paz, done at request of Dr. Ivory to assess if progression or transformation of her  disease. Not clear what results showed/indication but patient reports she discussed with Dr. Ivory.       4. CLL: At least stage III per oncology notes. Diagnosed in 4/2007 and has been receiving IVIG every 4 weeks, last infusion was 10/31. Has chronic high white count. Has persistent right inguinal adenopathy as well as mediastinal and cervical adenopathy. Excisional biopsy of right inguinal adenopathy was completed 10/23 to assess if progression or transformation of her disease. Dr. Ivory has recommended chemotherapy but patient has chosen forgo doing this until after the holiday season, so they are re-visiting this in the next calendar year.      5. Bladder cancer: Having BCG treatment w/ Dr. Nuñez. F/u cysto 5/2016 without evidence of recurrence so receives 3 weekly doses of BCG maintenance therapy. 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 She gets cystos every 6 months for monitoring.      6. History of stress-induced cardiomyopathy: History of stress cardiomyopathy 10/2014 in setting of selling her home (dream home where she lived with her ) with subsequent resolution of wall motion abnormalities and normalization of LV function. Coronary angiogram at that time showed normal coronary arteries. Has followed w/ Dr. Ortega in the past and remains on beta blocker therapy. Echo 3/2017 showed EF of 60-65%.  -Resume PTA Toprol given hypotension  -Resume lasix       7. COPD: Former smoker of 60 pack years, quit in 2009. Worsening of chronic cough with more production than her baseline. Using an albuterol nebulizer, Breo, and Ventolin inhaler routinely. Has not established care with a pulmonologist yet, but did receive a referral from oncology clinic to do so. Not on home O2 at baseline but mild hypoxia now and will discharge with 1 L of oxygen by nasal cannula which I suspect will be weaned off and is more related to atelectasis      8. Hypothyroidism: Continue  levothyroxine 75 mcg daily.      9. HLD: Continue atorvastatin 20 mg daily.      10. Insomnia: Continue PTA trazodone.     11.  Acute blood loss anemia: Decrease in her hemoglobin as expected from surgery and hemodilution.  No indication for transfusion at this point.[JK1.2]    The patient was seen, examined, and counseled on this day. The hospitalization and plan of care was reviewed with the patient extensively. All questions were addressed and the patient agreed to follow-up as noted above.      Total time spent in face to face contact with the patient and coordinating discharge was:[JK1.1]  35[JK1.2] Minutes    Wenceslao Craig DO MPH  [facility] Hospitalist  [address] [facility] [place] [address]  Pager: [phone][JK1.1]  11/21/2018[JK1.3]      Revision History        User Key Date/Time User Provider Type Action    > JK1.4 11/21/2018 12:46 PM Wenceslao Craig DO Physician Sign     JK1.2 11/21/2018 12:43 PM Wenceslao Craig DO Physician      JK1.3 11/21/2018  8:45 AM Wenceslao Craig DO Physician      JK1.1 11/21/2018  8:44 AM Wenceslao Craig DO Physician                      Consult Notes      Consults signed by Scar Reynolds MD at 11/20/2018  3:06 PM      Author:  Scar Reynolds MD Service:  Orthopedics Author Type:  Physician    Filed:  11/20/2018  3:06 PM Date of Service:  11/19/2018  7:13 AM Creation Time:  11/19/2018  7:41 AM    Status:  Signed :  Scar Reynolds MD (Physician)         Consult Date:  11/18/2018      DIAGNOSIS:  A left displaced femoral neck fracture.      HISTORY OF PRESENT ILLNESS:  Ms. Kline is a very pleasant 86-year-old female who presented with left hip pain after a fall.  She was walking in her living room which was dark and actually walked into a glass coffee table, causing her to trip and land on her hip.  After the fall, she was not able to bear weight on the hip.  She presented to the Emergency Department at [facility]  and found to have a left displaced femoral neck fracture and was admitted for definitive care.      PAST MEDICAL AND SURGICAL HISTORY:  Significant for arthritis, bladder cancer, cardiomyopathy, chronic lymphocytic leukemia, congestive heart failure, COPD, hypertension, hypothyroid, myocardial infarction, pulmonary edema and tricuspid valve disorders.      ALLERGIES:  Contrast dye and wound dressing adhesive.      MEDICATIONS ON ADMISSION:  She is on albuterol, Lipitor, Symbicort, Prolia, iron, Lasix, Neurontin, Synthroid, Toprol and Desyrel.      FAMILY HISTORY:  Noncontributory.      SOCIAL HISTORY:  She is a former smoker, quit in 1996, does not use alcohol and is .      REVIEW OF SYSTEMS:  Ten-point review of systems is positive for left hip arthralgias and skin tears on her shins.      PHYSICAL EXAMINATION:   GENERAL:  She is lying in her hospital bed in no apparent distress and afebrile.   VITAL SIGNS:  Stable.  No obvious head trauma.   EXTREMITIES:  Right and left upper extremities are within normal limits.  Skin is clean, dry and intact.  Palpable radial pulses.  Radial, ulnar and median nerve sensation and function intact bilaterally.  Right and left lower extremity skin tears on her shins.  She is shortened and externally rotated.  Tender to palpation around the left hip.  She has full function of EHL, FHL, tibialis anterior and gastroc soleus.  He has full sensation in superficial peroneal, deep peroneus, saphenous, tibial and sural nerves bilaterally.  She has palpable dorsalis pedis pulses.      IMAGING:  X-rays of her left AP pelvis and left lateral hip shows a left femoral neck fracture, mildly displaced.      LABORATORY DATA:  On admission, sodium is 140 and potassium 4.3.  White blood cell count 73.5 and hemoglobin 12.0.      IMPRESSION AND PLAN:  I had a long discussion with Ms. Kline regarding her left femoral neck fracture.  This displaced femoral neck fracture will do well with a bipolar  hemiarthroplasty for both, pain management and increase mobility.  She understands the risks, benefits, alternatives and wishes to proceed with surgery.  We will make arrangements for surgery later today.         SCAR REYNOLDS MD             D: 2018   T: 2018   MT: JALEESA      Name:     MAGGI KLINE   MRN:      [id number]        Account:       [id number]   :      1932           Consult Date:  2018      Document: [id number]    [JN1.1]   Revision History        User Key Date/Time User Provider Type Action    > JN1.1 2018  3:06 PM Scar Reynolds MD Physician Sign                     Progress Notes - Physician (Notes for yesterday and today)      Progress Notes by Melony Thompson PA-C at 2018 11:39 AM     Author:  Melony Thompson PA-C Service:  Orthopedics Author Type:  Physician Assistant - C    Filed:  2018 11:41 AM Date of Service:  2018 11:39 AM Creation Time:  2018 11:39 AM    Status:  Signed :  Melony Thompson PA-C (Physician Assistant - C)         Orthopedic Surgery  2018  POD#: 3    S: Patient voices no complaints today. Feeling well and denies calf pain, dizziness, SOB.    O: Blood pressure 110/55, pulse 64, temperature 97.9  F (36.6  C), temperature source Oral, resp. rate 16, weight 47.2 kg (104 lb), SpO2 95 %, not currently breastfeeding.  Lab Results   Component Value Date    HGB 8.3 2018     Lab Results   Component Value Date    INR 1.00 2018        I/O last 3 completed shifts:  In: 583 [P.O.:580; I.V.:3]  Out: 1675 [Urine:1675]    Neurovascularly intact.  Calves are negative bilaterally, both soft and nontender.  The wound is C/D/I.  The wound looks good with minimal erythema of the surrounding skin.    A: Ms. Kline is doing well status post Procedure(s):  Left hip bipolar hemiarthroplasty.    P:   1. Mobilize and continue physical therapy. WBAT. No hip precautions.  2. Anticoagulation   3. Pain  management  4. Anticipate discharge to [facility] today.      Melony Thompson PA-C  O: [phone][AB1.1]     Revision History        User Key Date/Time User Provider Type Action    > AB1.1 11/22/2018 11:41 AM Melony Thompson PA-C Physician Assistant - C Sign            Progress Notes by Wenceslao Craig DO at 11/22/2018 10:36 AM     Author:  Wenceslao Craig DO Service:  (none) Author Type:  Physician    Filed:  11/22/2018 10:37 AM Date of Service:  11/22/2018 10:36 AM Creation Time:  11/22/2018 10:36 AM    Status:  Signed :  Wenceslao Craig DO (Physician)         Hospitalist Follow-up Note    Patient seen and doing well. Discharged yesterday and no change overnight. Planned to TCU today. Please see discharge summary from yesterday.[JK1.1]     Wenceslao Craig  November 22, 2018[JK1.2]       Revision History        User Key Date/Time User Provider Type Action    > JK1.2 11/22/2018 10:37 AM Wenceslao Craig DO Physician Sign     JK1.1 11/22/2018 10:36 AM Wenceslao Craig DO Physician             Progress Notes by Scar Reynolds MD at 11/21/2018  7:04 AM     Author:  Scar Reynolds MD Service:  Orthopedics Author Type:  Physician    Filed:  11/21/2018  7:04 AM Date of Service:  11/21/2018  7:04 AM Creation Time:  11/21/2018  7:04 AM    Status:  Signed :  Scar Reynolds MD (Physician)         Orthopedic Surgery  11/21/2018    S: Patient voices no complaints today.     O: Blood pressure 116/54, pulse 64, temperature 97.1  F (36.2  C), temperature source Oral, resp. rate 16, weight 47.2 kg (104 lb), SpO2 94 %, not currently breastfeeding.  Lab Results   Component Value Date    HGB 8.3 11/21/2018     Lab Results   Component Value Date    INR 1.00 08/24/2018        Neurovascularly intact.  Calves are negative bilaterally, both soft and nontender.  The wound is C/D/I.  The wound looks good with minimal erythema of the surrounding skin.    A: Ms. Kline is doing well  "status post Procedure(s):  Left bipolar hemiarthroplasty.    P: Continue physical therapy.   Anticoagulation   Pain management  Discharge planning    [facility]  [phone][JN1.1]       Revision History        User Key Date/Time User Provider Type Action    > JN1.1 11/21/2018  7:04 AM Scar Reynolds MD Physician Sign                  Procedure Notes     No notes of this type exist for this encounter.         Progress Notes - Therapies (Notes from 11/19/18 through 11/22/18)      Progress Notes by Na Boo RT at 11/21/2018 12:25 PM     Author:  Na Boo RT Service:  Respiratory Therapy Author Type:  Respiratory Therapist    Filed:  11/21/2018 12:27 PM Date of Service:  11/21/2018 12:25 PM Creation Time:  11/21/2018 12:25 PM    Status:  Signed :  Na Boo RT (Respiratory Therapist)         Date:11/21/2018  Admission Dx: Fall, Hip Fx  Pulmonary History: COPD  Home Nebulizer/MDI Use: Symbicort BID, Albuterol prn  Home Oxygen: 2L at noc   Acuity Level (RCAT flow sheet): 4    Aerosol Therapy initiated: Albuterol prn    Pulmonary Hygiene initiated: Deep breathe and cough    Volume Expansion initiated: IS    Current Oxygen Requirements: 1LPM Nc    Current SpO2: 92%    Re-evaluation date: 28 Nov    Patient Education: Patient informed of medication benefits and possible side effects.    See \"RT Assessments\" flow sheet for patient assessment scoring and Acuity Level Details.[KT1.1]              Revision History        User Key Date/Time User Provider Type Action    > KT1.1 11/21/2018 12:27 PM Na Boo RT Respiratory Therapist Sign            Progress Notes by Lore Chacon PT at 11/20/2018  1:03 PM     Author:  Lore Chacon PT Service:  (none) Author Type:  Physical Therapist    Filed:  11/20/2018  1:03 PM Date of Service:  11/20/2018  1:03 PM Creation Time:  11/20/2018  1:03 PM    Status:  Signed :  Lore Chacon PT (Physical Therapist)     "        11/20/18 0947   Quick Adds   Type of Visit Initial PT Evaluation   Living Environment   Lives With alone   Living Arrangements independent living facility  ([facility])   Home Accessibility no concerns   Living Environment Comment Pt reports has a chidi Morales namedJohnathan Hsieh   Self-Care   Regular Exercise yes   Activity/Exercise Type walking   Equipment Currently Used at Home walker, rolling;wheelchair, manual;raised toilet;shower chair;cane, straight   Functional Level Prior   Ambulation 0-->independent   Transferring 0-->independent   Toileting 0-->independent   Bathing 0-->independent   Dressing 0-->independent   Eating 0-->independent   Communication 0-->understands/communicates without difficulty   Swallowing 0-->swallows foods/liquids without difficulty   Cognition 0 - no cognition issues reported   Fall history within last six months yes   Number of times patient has fallen within last six months 1   General Information   Onset of Illness/Injury or Date of Surgery - Date 11/19/18   Referring Physician Dr. Tillman   Patient/Family Goals Statement Pt no stated goals   Pertinent History of Current Problem (include personal factors and/or comorbidities that impact the POC) Pt is status post fall, femoral neck fracture. Pt is now status post L STACEY. Pt has medical history of COPD, CLL, MI.    Weight-Bearing Status - LLE weight-bearing as tolerated   Cognitive Status Examination   Orientation orientation to person, place and time   Level of Consciousness alert   Follows Commands and Answers Questions 100% of the time;able to follow multistep instructions   Personal Safety and Judgment intact   Memory intact   Pain Assessment   Patient Currently in Pain Yes, see Vital Sign flowsheet   Integumentary/Edema   Integumentary/Edema Comments as expected in L hip   Posture    Posture Forward head position;Protracted shoulders   Range of Motion (ROM)   ROM Comment tolerating 90 degrees of ROM in hip   Strength  "  Strength Comments able to perform inde SAQ, decreased functional mob   Bed Mobility   Bed Mobility Comments min A   Transfer Skills   Transfer Comments min A   Gait   Gait Comments min A   Balance   Balance Comments 1 LOB with turning, FWW   Modality Interventions   Planned Modality Interventions Cryotherapy   General Therapy Interventions   Planned Therapy Interventions balance training;bed mobility training;gait training;strengthening;transfer training;risk factor education;home program guidelines;progressive activity/exercise   Clinical Impression   Criteria for Skilled Therapeutic Intervention yes, treatment indicated   PT Diagnosis decreased functional mobility status post L STACEY due to fall and femoral neck fx   Influenced by the following impairments pain, decreased ROM, decreased strength   Functional limitations due to impairments decreased ambulation, transfers, bed mob   Clinical Presentation Stable/Uncomplicated   Clinical Presentation Rationale improving status post STACEY    Clinical Decision Making (Complexity) Low complexity   Therapy Frequency` daily   Predicted Duration of Therapy Intervention (days/wks) 3 days   Anticipated Discharge Disposition Transitional Care Facility   Risk & Benefits of therapy have been explained Yes   Patient, Family & other staff in agreement with plan of care Yes   [facility] [employer] \"6 Clicks\"   2016, Trustees of [facility], under license to [employer].  All rights reserved.   6 Clicks Short Forms Basic Mobility Inpatient Short Form   [facility] [employer]  \"6 Clicks\" V.2 Basic Mobility Inpatient Short Form   1. Turning from your back to your side while in a flat bed without using bedrails? 3 - A Little   2. Moving from lying on your back to sitting on the side of a flat bed without using bedrails? 3 - A Little   3. Moving to and from a bed to a chair (including a wheelchair)? 3 - A Little   4. Standing up from a chair using your arms (e.g., " "wheelchair, or bedside chair)? 3 - A Little   5. To walk in hospital room? 3 - A Little   6. Climbing 3-5 steps with a railing? 3 - A Little   Basic Mobility Raw Score (Score out of 24.Lower scores equate to lower levels of function) 18   Total Evaluation Time   Total Evaluation Time (Minutes) 10[MP1.1]        Revision History        User Key Date/Time User Provider Type Action    > MP1.1 11/20/2018  1:03 PM Lore Chacon PT Physical Therapist Sign            Progress Notes by Rehan Batres RT at 11/18/2018 11:45 PM     Author:  Rehan Batres RT Service:  (none) Author Type:  Respiratory Therapist    Filed:  11/19/2018 12:06 AM Date of Service:  11/18/2018 11:45 PM Creation Time:  11/19/2018 12:03 AM    Status:  Signed :  Rehan Batres RT (Respiratory Therapist)         [facility] RCAT     Date:11/19/2018  Admission Dx:Fall  Pulmonary History:COPD  Home Nebulizer/MDI Use:Alb as needed per pt, Symbicort BID  Home Oxygen:2L @ noc  Acuity Level (RCAT flow sheet):4  Aerosol Therapy initiated:Alb Q4 prn, Alb mdi prn      Pulmonary Hygiene initiated:Deep breath and coughing techniques      Volume Expansion initiated:IS      Current Oxygen Requirements:2L  Current SpO2:91%    Re-evaluation date:11/21/2018    Patient Education:Education performed with patient in regards to indications/benefits of bronchodilators. Will continue to educate with patient as needed.         See \"RT Assessments\" flow sheet for patient assessment scoring and Acuity Level Details.[JW1.1]                Revision History        User Key Date/Time User Provider Type Action    > JW1.1 11/19/2018 12:06 AM Rehan Batres RT Respiratory Therapist Sign                                                      INTERAGENCY TRANSFER FORM - LAB / IMAGING / EKG / EMG RESULTS   11/18/2018                       [facility] SPINE: [phone]            Unresulted Labs (24h ago through future)    Start       Ordered    Unscheduled  Hemoglobin  " CONDITIONAL X 2,   Routine     Comments:  Release on POD 1 and POD 2 if the morning Hgb is less than 8.0    11/19/18 1522         Lab Results - 3 Days      Basic metabolic panel [[id number]] (Abnormal)  Resulted: 11/21/18 0634, Result status: Final result    Ordering provider: Wenceslao Craig,   11/21/18 0000 Resulting lab: [facility]    Specimen Information    Type Source Collected On   Blood  11/21/18 0603          Components       Value Reference Range Flag Lab   Sodium 140 133 - 144 mmol/L  FrRdHs   Potassium 4.6 3.4 - 5.3 mmol/L  FrRdHs   Chloride 107 94 - 109 mmol/L  FrRdHs   Carbon Dioxide 34 20 - 32 mmol/L H FrRdHs   Anion Gap <1 3 - 14 mmol/L L FrRdHs   Glucose 87 70 - 99 mg/dL  FrRdHs   Urea Nitrogen 7 7 - 30 mg/dL  FrRdHs   Creatinine 0.98 0.52 - 1.04 mg/dL  FrRdHs   GFR Estimate 54 >60 mL/min/1.7m2 L FrRdHs   Comment:  Non  GFR Calc   GFR Estimate If Black 65 >60 mL/min/1.7m2  FrRdHs   Comment:  African American GFR Calc   Calcium 7.4 8.5 - 10.1 mg/dL L FrRdHs            CBC with platelets [[id number]] (Abnormal)  Resulted: 11/21/18 0612, Result status: Final result    Ordering provider: Wenceslao Craig DO  11/21/18 0000 Resulting lab: [facility]    Specimen Information    Type Source Collected On   Blood  11/21/18 0603          Components       Value Reference Range Flag Lab   WBC 37.4 4.0 - 11.0 10e9/L H FrRdHs   RBC Count 2.79 3.8 - 5.2 10e12/L L FrRdHs   Hemoglobin 8.3 11.7 - 15.7 g/dL L FrRdHs   Hematocrit 27.5 35.0 - 47.0 % L FrRdHs   MCV 99 78 - 100 fl  FrRdHs   MCH 29.7 26.5 - 33.0 pg  FrRdHs   MCHC 30.2 31.5 - 36.5 g/dL L FrRdHs   RDW 17.2 10.0 - 15.0 % H FrRdHs   Platelet Count 53 150 - 450 10e9/L L FrRdHs            CBC with platelets [[id number]] (Abnormal)  Resulted: 11/20/18 0722, Result status: Final result    Ordering provider: Wenceslao Craig DO  11/20/18 0001 Resulting lab: [facility]    Specimen Information    Type  Source Collected On   Blood  11/20/18 0629          Components       Value Reference Range Flag Lab   WBC 52.9 4.0 - 11.0 10e9/L HH FrRdHs   Comment:         .   Critical result, provider not notified due to previous critical result   notification.     RBC Count 2.84 3.8 - 5.2 10e12/L L FrRdHs   Hemoglobin 8.3 11.7 - 15.7 g/dL L FrRdHs   Hematocrit 27.9 35.0 - 47.0 % L FrRdHs   MCV 98 78 - 100 fl  FrRdHs   MCH 29.2 26.5 - 33.0 pg  FrRdHs   MCHC 29.7 31.5 - 36.5 g/dL L FrRdHs   RDW 17.2 10.0 - 15.0 % H FrRdHs   Platelet Count 66 150 - 450 10e9/L L FrRdHs            Basic metabolic panel [[id number]] (Abnormal)  Resulted: 11/20/18 0713, Result status: Final result    Ordering provider: Wenceslao Craig DO  11/20/18 0001 Resulting lab: [facility]    Specimen Information    Type Source Collected On   Blood  11/20/18 0629          Components       Value Reference Range Flag Lab   Sodium 138 133 - 144 mmol/L  FrRdHs   Potassium 5.0 3.4 - 5.3 mmol/L  FrRdHs   Chloride 105 94 - 109 mmol/L  FrRdHs   Carbon Dioxide 33 20 - 32 mmol/L H FrRdHs   Anion Gap <1 3 - 14 mmol/L L FrRdHs   Glucose 102 70 - 99 mg/dL H FrRdHs   Urea Nitrogen 11 7 - 30 mg/dL  FrRdHs   Creatinine 1.14 0.52 - 1.04 mg/dL H FrRdHs   GFR Estimate 45 >60 mL/min/1.7m2 L FrRd   Comment:  Non  GFR Calc   GFR Estimate If Black 55 >60 mL/min/1.7m2 L FrRd   Comment:  African American GFR Calc   Calcium 7.2 8.5 - 10.1 mg/dL L FrRd            Basic metabolic panel [[id number]] (Abnormal)  Resulted: 11/19/18 1620, Result status: Final result    Ordering provider: Wenceslao Craig DO  11/19/18 1538 Resulting lab: [facility]    Specimen Information    Type Source Collected On   Blood  11/19/18 1548          Components       Value Reference Range Flag Lab   Sodium 137 133 - 144 mmol/L  FrRdHs   Potassium 5.0 3.4 - 5.3 mmol/L  FrRdHs   Chloride 103 94 - 109 mmol/L  FrRdHs   Carbon Dioxide 33 20 - 32 mmol/L H FrRdHs    Anion Gap 1 3 - 14 mmol/L L FrRdHs   Glucose 150 70 - 99 mg/dL H FrRdHs   Urea Nitrogen 12 7 - 30 mg/dL  FrRdHs   Creatinine 1.10 0.52 - 1.04 mg/dL H FrRdHs   GFR Estimate 47 >60 mL/min/1.7m2 L FrRdHs   Comment:  Non  GFR Calc   GFR Estimate If Black 57 >60 mL/min/1.7m2 L FrRdHs   Comment:  African American GFR Calc   Calcium 7.5 8.5 - 10.1 mg/dL L FrRdHs            Lactic acid whole blood [[id number]]  Resulted: 11/19/18 1605, Result status: Final result    Ordering provider: Wenceslao Craig DO  11/19/18 1538 Resulting lab: [facility]    Specimen Information    Type Source Collected On   Blood  11/19/18 1548          Components       Value Reference Range Flag Lab   Lactic Acid 0.8 0.7 - 2.0 mmol/L  FrRdHs            Platelet count [[id number]] (Abnormal)  Resulted: 11/19/18 1600, Result status: Final result    Ordering provider: Scar Reynolds MD  11/19/18 1522 Resulting lab: [facility]    Specimen Information    Type Source Collected On   Blood  11/19/18 1548          Components       Value Reference Range Flag Lab   Platelet Count 67 150 - 450 10e9/L L FrRdHs            Hemoglobin [[id number]] (Abnormal)  Resulted: 11/19/18 1600, Result status: Final result    Ordering provider: Wenceslao Craig DO  11/19/18 1538 Resulting lab: [facility]    Specimen Information    Type Source Collected On   Blood  11/19/18 1548          Components       Value Reference Range Flag Lab   Hemoglobin 9.9 11.7 - 15.7 g/dL L FrRdHs            CBC with platelets differential [[id number]] (Abnormal)  Resulted: 11/19/18 0834, Result status: Final result    Ordering provider: Joselin Carrasco PA-C  11/19/18 0000 Resulting lab: [facility]    Specimen Information    Type Source Collected On   Blood  11/19/18 0731          Components       Value Reference Range Flag Lab   WBC 69.2 4.0 - 11.0 10e9/L HH FrRdHs   Comment:         .   Critical result,  provider not notified due to previous critical result   notification.     RBC Count 3.75 3.8 - 5.2 10e12/L L FrRdHs   Hemoglobin 11.3 11.7 - 15.7 g/dL L FrRdHs   Hematocrit 36.9 35.0 - 47.0 %  FrRdHs   MCV 98 78 - 100 fl  FrRdHs   MCH 30.1 26.5 - 33.0 pg  FrRdHs   MCHC 30.6 31.5 - 36.5 g/dL L FrRdHs   RDW 17.1 10.0 - 15.0 % H FrRdHs   Platelet Count 68 150 - 450 10e9/L L FrRdHs   Diff Method Manual Differential   FrRdHs   % Neutrophils 7.0 %  FrRdHs   % Lymphocytes 87.0 %  FrRdHs   % Monocytes 6.0 %  FrRdHs   % Eosinophils 0.0 %  FrRdHs   % Basophils 0.0 %  FrRdHs   Absolute Neutrophil 4.8 1.6 - 8.3 10e9/L  FrRdHs   Absolute Lymphocytes 60.2 0.8 - 5.3 10e9/L H FrRdHs   Absolute Monocytes 4.2 0.0 - 1.3 10e9/L H FrRdHs   Absolute Eosinophils 0.0 0.0 - 0.7 10e9/L  FrRdHs   Absolute Basophils 0.0 0.0 - 0.2 10e9/L  FrRdHs   Anisocytosis Slight   FrRdHs   Elliptocytes Slight   FrRdHs   Platelet Estimate Automated count confirmed.  Platelet morphology is normal.   [facility]            Basic metabolic panel [[id number]] (Abnormal)  Resulted: 11/19/18 0812, Result status: Final result    Ordering provider: Joselin Carrasco PA-C  11/19/18 0000 Resulting lab: [facility]    Specimen Information    Type Source Collected On   Blood  11/19/18 0731          Components       Value Reference Range Flag Lab   Sodium 138 133 - 144 mmol/L  FrRdHs   Potassium 4.8 3.4 - 5.3 mmol/L  FrRdHs   Chloride 103 94 - 109 mmol/L  FrRdHs   Carbon Dioxide 35 20 - 32 mmol/L H FrRdHs   Anion Gap <1 3 - 14 mmol/L L FrRdHs   Glucose 104 70 - 99 mg/dL H FrRdHs   Urea Nitrogen 10 7 - 30 mg/dL  FrRdHs   Creatinine 1.14 0.52 - 1.04 mg/dL H FrRdHs   GFR Estimate 45 >60 mL/min/1.7m2 L [facility]   Comment:  Non  GFR Calc   GFR Estimate If Black 55 >60 mL/min/1.7m2 L [facility]   Comment:  African American GFR Calc   Calcium 8.2 8.5 - 10.1 mg/dL L [facility]            Testing Performed By     Lab - Abbreviation Name Director Address Valid Date  Range    12 - FrRdHs [facility] Unknown [address] [place]  [facility] [address] 05/08/15 1057 - Present               Imaging Results - 3 Days      XR Pelvis w Hip Port Left 1 View [[id number]]  Resulted: 11/19/18 1214, Result status: Final result    Ordering provider: Scar Reynolds MD  11/19/18 1122 Resulted by: Padmini Moulton MD    Performed: 11/19/18 1137 - 11/19/18 1152 Resulting lab: RADIOLOGY RESULTS    Narrative:       PELVIS AND HIP PORTABLE LEFT ONE VIEW   11/19/2018 11:52 AM     HISTORY: Postoperative.     COMPARISON: 11/18/2018      Impression:       IMPRESSION: Interval left total hip replacement. No fracture or  dislocation. Right hip unremarkable.    PADMINI MOULTON MD      Testing Performed By     Lab - Abbreviation Name Director Address Valid Date Range    104 - Rad Rslts RADIOLOGY RESULTS Unknown Unknown 02/16/05 1553 - Present            Encounter-Level Documents:     There are no encounter-level documents.      Order-Level Documents:     There are no order-level documents.

## 2018-11-18 NOTE — LETTER
Transition Communication Hand-off for Care Transitions to Next Level of Care Provider    Name: Marie Kline  : 1932  MRN #: [id number]  Primary Care Provider: Piper Kiser     Primary Clinic: [facility] [address]     Reason for Hospitalization:  Fracture of hip, left, closed, initial encounter (H) [S72.002A]  Skin tear of lower leg without complication, unspecified laterality, initial encounter [S81.819A]  Admit Date/Time: 2018  6:26 PM  Discharge Date: 18  Payor Source: Payor: MEDICARE / Plan: MEDICARE / Product Type: Medicare /     Readmission Assessment Measure (CARLOS MANUEL) Risk Score/category: Average          Reason for Communication Hand-off Referral: Fragility  Other FAll with frature chg in self care chg in mobility with recommnedations for TCU     Discharge Plan:  Discharge Plan:       Most Recent Value    Disposition Comments Per discussion this morning with BP  Angelica planning will be for pt's discharge to TCU. Per Angelica she has spoken with pt, request made for referrals to [facility] and [facility]        Discharge Needs Assessment:  Needs       Most Recent Value    Equipment Currently Used at Home walker, rolling, wheelchair, manual, raised toilet, shower chair, cane, straight    # of Referrals Placed by CTS Post Acute Facilities      Follow-up specialty is recommended:Yes, Ortho  Follow-up plan:   Any outstanding tests or procedures:        Referrals     Future Labs/Procedures    Occupational Therapy Adult Consult     Comments:    Evaluate and treat as clinically indicated.    Reason:  left hip hemiarthroplasty    Physical Therapy Adult Consult     Comments:    Evaluate and treat as clinically indicated.    Reason:  left hip hemiarthroplasty            Key Recommendations:  CTS following 2/2 to fragility 86 year old living alone in apartment at [facility], with recommendations for TCU. Pt did well post op, Barriers to return to Apartment were  related to mobility. Pt discharged to [facility] TCU. Please follow closely upon discharge from TCU, assessing for needs to ensure successful transition of care      Jocelin Lambert/Tahmina Larios RN CTS    AVS/Discharge Summary is the source of truth; this is a helpful guide for improved communication of patient story

## 2018-11-18 NOTE — IP AVS SNAPSHOT
` ` Patient Information     Patient Name Sex     Marie Kline ([id number]) Female 1932       Room Bed    [address] [place] [address]      Patient Demographics     Address Phone E-mail Address    [address] [place] [address] [address] [place] [address] [phone] (Home)  none (Work)  [phone] (Mobile) *Preferred* [email or web address]      Patient Ethnicity & Race     Ethnic Group Patient Race    American White      Emergency Contact(s)     Name Relation Home Work Mobile    Margaret Martines Daughter [phone]    Wenceslao Zhao Relative   [phone]    Rico Bonilla Grandchild   [phone]      Documents on File        Status Date Received Description       Documents for the Patient    Consent for Services - Hospital/Clinic Received () 10/08/14     Consent for EHR Access Received 10/08/14     Privacy Notice - [facility] Received 10/08/14     Insurance Card Received () 10/08/15 Medicare Part A and B/ [employer]    External Medication Information Consent Accepted 14     Patient ID Received () 10/08/15 [place] 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    Business/Insurance/Care Coordination/Health Form - Patient  16 PRIOR AUTH APPROVAL, ZOLPIDEM- 2016    Advance Directives and Living Will Received 16 POLST 16    Business/Insurance/Care Coordination/Health Form - Patient  10/06/16 PRIOR AUTH DENIAL, SPIRIVA 2016    Consent for Services/Privacy Notice - Hospital/Clinic   Consent For Service    Consent for Services/Privacy Notice - Hospital/Clinic Received () 17     Insurance Card Received 17 [facility] Senior Gold [id number]    Insurance Card Received 17 Duplicate from 2015 - Medicare A & B    Consent to Communicate Received 17 AUTHORIZATION TO DISCUSS PROTECTED HEALTH INFORMATION 2017    HIM PERCY Authorization - File Only Received 17 [facility] DR RIDDLE    Immunization Record  17 [facility]  IMMUNIZATIONS    Immunization Record  17 [facility], IMMUNIZATION    Care Everywhere Prospective Auth Received 10/23/17     Patient ID Received 18 [place] 2019    Insurance Card Received 18 duplicate from 4.3.2017 - [facility] Senior Gold [id number]    Consent for Services/Privacy Notice - Hospital/Clinic Received 18     Consent for Services - Hospital and Clinic Received 18     HIE Auth Received 18     Business/Insurance/Care Coordination/Health Form - Patient  18 PRESCRIPTION ASSISTANCE APPLICATIONS 3/21/18    Insurance Card Received 18 New Medicare     HIM PERCY Authorization  18 [facility]       Documents for the Encounter    CMS IM for Patient Signature Received 18     Plan of Care  18 POST OPERATIVE PLAN OF CARE    CMS IM for Patient Signature Received 18 DC IMM       Admission Information     Attending Provider Admitting Provider Admission Type Admission Date/Time    Scar Reynolds MD Nemanich, Joseph Patrick, MD Emergency 18  7456    Discharge  Date Hospital Service Auth/Cert Status Service Area     Orthopedics Incomplete [facility]    Unit Room/Bed Admission Status        ORTHO [facility] [id number] Admission (Confirmed)       Admission     Complaint    Femoral neck fracture (H), Left femoral neck fracture, S/P total hip arthroplasty      Hospital Account     Name Acct ID Class Status Primary Coverage    Raisa Marie CHEATHAM [id number] Inpatient Open MEDICARE - MEDICARE            Guarantor Account (for Hospital Account #[id number])     Name Relation to Pt Service Area Active? Acct Type    Marie Kline Self FCS Yes Personal/Family    Address Phone          [address] [place] [address] [address]  [place] [address] [phone](H)              Coverage Information (for Hospital Account #[id number])     1. MEDICARE/MEDICARE     F/O Payor/Plan Precert #    MEDICARE/MEDICARE     Subscriber Subscriber #    Marie Kline [id number]    Address Phone    [employer] CLAIMS  [address]  [place] [address] [phone]          2. [place]     F/O Payor/Plan Precert #    [place]     Subscriber Subscriber #    Marie Kline CHAPARRITA [id number]    Address Phone    [address] [address]  [place] [address] [phone]

## 2018-11-18 NOTE — IP AVS SNAPSHOT
MRN:[id number]                      After Visit Summary   11/18/2018    Marie Kline    MRN: [id number]           Thank you!     Thank you for choosing [facility] for your care. Our goal is always to provide you with excellent care. Hearing back from our patients is one way we can continue to improve our services. Please take a few minutes to complete the written survey that you may receive in the mail after you visit. If you would like to speak to someone directly about your visit please contact Patient Relations at [phone]. Thank you!          Patient Information     Date Of Birth          4/28/1932        Designated Caregiver       Most Recent Value    Caregiver    Will someone help with your care after discharge? yes    Name of designated caregiver Margaret    Phone number of caregiver see pt summary    Caregiver address [address] [address] [facility] [address] [facility]      About your hospital stay     You were admitted on:  November 18, 2018 You last received care in the:  [facility]    You were discharged on:  November 22, 2018        Reason for your hospital stay       Left femoral neck fracture, left hip hemiarthroplasty                  Who to Call     For medical emergencies, please call 911.  For non-urgent questions about your medical care, please call your primary care provider or clinic, [phone]  For questions related to your surgery, please call your surgery clinic        Attending Provider     Provider Specialty    Sadiq Alexandre MD Emergency Medicine    Wenceslao Craig DO Internal Medicine    Scar Reynolds MD Orthopaedic Surgery       Primary Care Provider Office Phone # Fax #    Piper Catracho Kiser [phone]      After Care Instructions     Activity - Up with assistive device           Advance Diet as Tolerated       Follow this diet upon discharge: Orders Placed This Encounter      Advance Diet as Tolerated: Regular  Diet Adult            Fall precautions           General info for SNF       Length of Stay Estimate: Short Term Care: Estimated # of Days <30  Condition at Discharge: Improving  Level of care:skilled   Rehabilitation Potential: Good  Admission H&P remains valid and up-to-date: Yes  Recent Chemotherapy: N/A  Use Nursing Home Standing Orders: Yes            Hip precautions           Mantoux instructions       Give two-step Mantoux (PPD) Per Facility Policy Yes            Oxygen - Nasal cannula       1-2 Lpm by nasal cannula to keep O2 sats 92% or greater.            Weight bearing status           Wound care       Site:   Left hip  Instructions:  Leave dressing intact if Aquacel and remove at POD #7, remove staples POD #14  Daily dressing changes with gauze and tape                  Follow-up Appointments     Follow Up and recommended labs and tests       Follow up with Dr. Reynolds in 2 weeks.  No follow up labs or test are needed.  Call for appointment [phone]                  Additional Services     Occupational Therapy Adult Consult       Evaluate and treat as clinically indicated.    Reason:  left hip hemiarthroplasty            Physical Therapy Adult Consult       Evaluate and treat as clinically indicated.    Reason:  left hip hemiarthroplasty                  Pending Results     No orders found from 11/16/2018 to 11/19/2018.            Statement of Approval     Ordered          11/21/18 0844  I have reviewed and agree with all the recommendations and orders detailed in this document.  EFFECTIVE NOW     Approved and electronically signed by:  Wenceslao Craig DO             Admission Information     Date & Time Provider Department Dept. Phone    11/18/2018 Scar Reynolds MD [facility] [phone]      Your Vitals Were     Blood Pressure Pulse Temperature Respirations Weight Pulse Oximetry    110/55 64 97.9  F (36.6  C) (Oral) 16 47.2 kg (104 lb) 95%    BMI (Body Mass Index)  "                  21.01 kg/m2           blogTV Information     blogTV lets you send messages to your doctor, view your test results, renew your prescriptions, schedule appointments and more. To sign up, go to [email or web address] . Click on \"Log in\" on the left side of the screen, which will take you to the Welcome page. Then click on \"Sign up Now\" on the right side of the page.     You will be asked to enter the access code listed below, as well as some personal information. Please follow the directions to create your username and password.     Your access code is: [email or web address]  Expires: 2019  9:53 AM     Your access code will  in 90 days. If you need help or a new code, please call your [place] clinic or [phone].        Care EveryWhere ID     This is your Care EveryWhere ID. This could be used by other organizations to access your [place] medical records  [id number]        Equal Access to Services     [facility] Hospital[facility] AH: Hadii juan leeo Soabelardo, waaxda luqadaha, qaybta kaalmada adeegyaralph, zoe hyatt . So [facility] [phone].    ATENCIÓN: Si habla español, tiene a eaton disposición servicios gratuitos de asistencia lingüística. Carol al [phone].    We comply with applicable federal civil rights laws and [place] laws. We do not discriminate on the basis of race, color, national origin, age, disability, sex, sexual orientation, or gender identity.               Review of your medicines      START taking        Dose / Directions    acetaminophen 325 MG tablet   Commonly known as:  TYLENOL   Used for:  Fracture of hip, left, closed, initial encounter (H)        Dose:  650 mg   Take 2 tablets (650 mg) by mouth every 4 hours as needed for mild pain   Quantity:  40 tablet   Refills:  0       aspirin 325 MG EC tablet   Commonly known as:  ASA   Used for:  Fracture of hip, left, closed, initial encounter (H)        Dose:  325 mg   Take 1 tablet (325 mg) by mouth " daily   Quantity:  45 tablet   Refills:  0       ondansetron 4 MG ODT tab   Commonly known as:  ZOFRAN-ODT   Used for:  Fracture of hip, left, closed, initial encounter (H)        Dose:  4 mg   Take 1 tablet (4 mg) by mouth every 6 hours as needed for nausea or vomiting   Quantity:  20 tablet   Refills:  0       senna-docusate 8.6-50 MG per tablet   Commonly known as:  SENOKOT-S;PERICOLACE   Used for:  Fracture of hip, left, closed, initial encounter (H)        Dose:  2 tablet   Take 2 tablets by mouth 2 times daily   Quantity:  40 tablet   Refills:  0       traMADol 50 MG tablet   Commonly known as:  ULTRAM   Used for:  Fracture of hip, left, closed, initial encounter (H)        Dose:  50 mg   Take 1 tablet (50 mg) by mouth every 4 hours as needed for moderate pain   Quantity:  60 tablet   Refills:  0         CONTINUE these medicines which have NOT CHANGED        Dose / Directions    * albuterol (2.5 MG/3ML) 0.083% neb solution        Dose:  2.5 mg   Take 2.5 mg by nebulization 2 times daily   Refills:  0       * albuterol 108 (90 Base) MCG/ACT inhaler   Commonly known as:  PROAIR HFA/PROVENTIL HFA/VENTOLIN HFA   Used for:  Intermittent asthma without complication        Dose:  2 puff   Inhale 2 puffs into the lungs every 6 hours as needed for wheezing   Quantity:  1 Inhaler   Refills:  8       atorvastatin 20 MG tablet   Commonly known as:  LIPITOR   Used for:  ACS (acute coronary syndrome) (H), Hyperlipidemia with target LDL less than 130        TAKE ONE TABLET BY MOUTH ONE TIME DAILY   Quantity:  90 tablet   Refills:  1       budesonide-formoterol 80-4.5 MCG/ACT Inhaler   Commonly known as:  SYMBICORT   Used for:  COPD exacerbation (H)        Dose:  2 puff   Inhale 2 puffs into the lungs 2 times daily   Quantity:  3 Inhaler   Refills:  1       denosumab 60 MG/ML Soln injection   Commonly known as:  PROLIA   Used for:  Senile osteoporosis, CKD (chronic kidney disease) stage 3, GFR 30-59 ml/min (H)        Dose:   60 mg   Inject 1 mL (60 mg) Subcutaneous every 6 months   Quantity:  1 mL   Refills:  0       ferrous sulfate 325 (65 Fe) MG tablet   Commonly known as:  IRON        Take by mouth daily (with breakfast)   Refills:  0       furosemide 20 MG tablet   Commonly known as:  LASIX   Used for:  Localized edema        TAKE ONE TABLET BY MOUTH ONE TIME DAILY   Quantity:  90 tablet   Refills:  1       gabapentin 300 MG capsule   Commonly known as:  NEURONTIN        Dose:  300 mg   Take 300 mg by mouth 2 times daily   Refills:  0       levothyroxine 75 MCG tablet   Commonly known as:  SYNTHROID/LEVOTHROID   Used for:  Other specified hypothyroidism        TAKE ONE TABLET BY MOUTH ONE TIME DAILY   Quantity:  90 tablet   Refills:  1       metoprolol succinate 25 MG 24 hr tablet   Commonly known as:  TOPROL-XL   Used for:  ACS (acute coronary syndrome) (H)        Dose:  25 mg   Take 1 tablet (25 mg) by mouth daily   Quantity:  90 tablet   Refills:  3       MULTIVITAMIN GUMMIES ADULT PO        Take by mouth daily   Refills:  0       order for DME   Used for:  COPD exacerbation (H)        Equipment being ordered: Nebulizer Fax order to [facility] [phone]   Quantity:  1 each   Refills:  0       traZODone 50 MG tablet   Commonly known as:  DESYREL        Dose:  100 mg   Take 100 mg by mouth At Bedtime   Refills:  0       * Notice:  This list has 2 medication(s) that are the same as other medications prescribed for you. Read the directions carefully, and ask your doctor or other care provider to review them with you.         Where to get your medicines      Some of these will need a paper prescription and others can be bought over the counter. Ask your nurse if you have questions.     Bring a paper prescription for each of these medications     acetaminophen 325 MG tablet    aspirin 325 MG EC tablet    ondansetron 4 MG ODT tab    senna-docusate 8.6-50 MG per tablet         Information about where to get these  medications is not yet available     ! Ask your nurse or doctor about these medications     traMADol 50 MG tablet                Protect others around you: Learn how to safely use, store and throw away your medicines at www.disposemymeds.org.        Information about OPIOIDS     PRESCRIPTION OPIOIDS: WHAT YOU NEED TO KNOW   We gave you an opioid (narcotic) pain medicine. It is important to manage your pain, but opioids are not always the best choice. You should first try all the other options your care team gave you. Take this medicine for as short a time (and as few doses) as possible.    Some activities can increase your pain, such as bandage changes or therapy sessions. It may help to take your pain medicine 30 to 60 minutes before these activities. Reduce your stress by getting enough sleep, working on hobbies you enjoy and practicing relaxation or meditation. Talk to your care team about ways to manage your pain beyond prescription opioids.    These medicines have risks:    DO NOT drive when on new or higher doses of pain medicine. These medicines can affect your alertness and reaction times, and you could be arrested for driving under the influence (DUI). If you need to use opioids long-term, talk to your care team about driving.    DO NOT operate heavy machinery    DO NOT do any other dangerous activities while taking these medicines.    DO NOT drink any alcohol while taking these medicines.     If the opioid prescribed includes acetaminophen, DO NOT take with any other medicines that contain acetaminophen. Read all labels carefully. Look for the word  acetaminophen  or  Tylenol.  Ask your pharmacist if you have questions or are unsure.    You can get addicted to pain medicines, especially if you have a history of addiction (chemical, alcohol or substance dependence). Talk to your care team about ways to reduce this risk.    All opioids tend to cause constipation. Drink plenty of water and eat foods that have  a lot of fiber, such as fruits, vegetables, prune juice, apple juice and high-fiber cereal. Take a laxative (Miralax, milk of magnesia, Colace, Senna) if you don t move your bowels at least every other day. Other side effects include upset stomach, sleepiness, dizziness, throwing up, tolerance (needing more of the medicine to have the same effect), physical dependence and slowed breathing.    Store your pills in a secure place, locked if possible. We will not replace any lost or stolen medicine. If you don t finish your medicine, please throw away (dispose) as directed by your pharmacist. The [place] Pollution Control Agency has more information about safe disposal: https://www.pca.[email or web address].mn.us/living-green/managing-unwanted-medications             Medication List: This is a list of all your medications and when to take them. Check marks below indicate your daily home schedule. Keep this list as a reference.      Medications           Morning Afternoon Evening Bedtime As Needed    acetaminophen 325 MG tablet   Commonly known as:  TYLENOL   Take 2 tablets (650 mg) by mouth every 4 hours as needed for mild pain   Last time this was given:  975 mg on 11/22/2018  8:47 AM                                * albuterol (2.5 MG/3ML) 0.083% neb solution   Take 2.5 mg by nebulization 2 times daily                                * albuterol 108 (90 Base) MCG/ACT inhaler   Commonly known as:  PROAIR HFA/PROVENTIL HFA/VENTOLIN HFA   Inhale 2 puffs into the lungs every 6 hours as needed for wheezing                                aspirin 325 MG EC tablet   Commonly known as:  ASA   Take 1 tablet (325 mg) by mouth daily                                atorvastatin 20 MG tablet   Commonly known as:  LIPITOR   TAKE ONE TABLET BY MOUTH ONE TIME DAILY   Last time this was given:  20 mg on 11/22/2018  8:46 AM                                budesonide-formoterol 80-4.5 MCG/ACT Inhaler   Commonly known as:  SYMBICORT   Inhale 2 puffs into  the lungs 2 times daily                                denosumab 60 MG/ML Soln injection   Commonly known as:  PROLIA   Inject 1 mL (60 mg) Subcutaneous every 6 months                                ferrous sulfate 325 (65 Fe) MG tablet   Commonly known as:  IRON   Take by mouth daily (with breakfast)                                furosemide 20 MG tablet   Commonly known as:  LASIX   TAKE ONE TABLET BY MOUTH ONE TIME DAILY                                gabapentin 300 MG capsule   Commonly known as:  NEURONTIN   Take 300 mg by mouth 2 times daily   Last time this was given:  300 mg on 11/22/2018  8:46 AM                                levothyroxine 75 MCG tablet   Commonly known as:  SYNTHROID/LEVOTHROID   TAKE ONE TABLET BY MOUTH ONE TIME DAILY   Last time this was given:  75 mcg on 11/22/2018  8:46 AM                                metoprolol succinate 25 MG 24 hr tablet   Commonly known as:  TOPROL-XL   Take 1 tablet (25 mg) by mouth daily                                MULTIVITAMIN GUMMIES ADULT PO   Take by mouth daily                                ondansetron 4 MG ODT tab   Commonly known as:  ZOFRAN-ODT   Take 1 tablet (4 mg) by mouth every 6 hours as needed for nausea or vomiting                                order for DME   Equipment being ordered: Nebulizer Fax order to [facility] [phone]                                senna-docusate 8.6-50 MG per tablet   Commonly known as:  SENOKOT-S;PERICOLACE   Take 2 tablets by mouth 2 times daily   Last time this was given:  1 tablet on 11/22/2018  8:47 AM                                traMADol 50 MG tablet   Commonly known as:  ULTRAM   Take 1 tablet (50 mg) by mouth every 4 hours as needed for moderate pain   Last time this was given:  50 mg on 11/21/2018  1:34 PM                                traZODone 50 MG tablet   Commonly known as:  DESYREL   Take 100 mg by mouth At Bedtime   Last time this was given:  100 mg on 11/21/2018  9:51 PM                                 * Notice:  This list has 2 medication(s) that are the same as other medications prescribed for you. Read the directions carefully, and ask your doctor or other care provider to review them with you.

## 2018-11-19 ENCOUNTER — ANESTHESIA EVENT (OUTPATIENT)
Dept: SURGERY | Facility: CLINIC | Age: 83
DRG: 470 | End: 2018-11-19
Payer: MEDICARE

## 2018-11-19 ENCOUNTER — ANESTHESIA (OUTPATIENT)
Dept: SURGERY | Facility: CLINIC | Age: 83
DRG: 470 | End: 2018-11-19
Payer: MEDICARE

## 2018-11-19 ENCOUNTER — APPOINTMENT (OUTPATIENT)
Dept: GENERAL RADIOLOGY | Facility: CLINIC | Age: 83
DRG: 470 | End: 2018-11-19
Attending: ORTHOPAEDIC SURGERY
Payer: MEDICARE

## 2018-11-19 PROBLEM — Z96.649 S/P TOTAL HIP ARTHROPLASTY: Status: ACTIVE | Noted: 2018-11-19

## 2018-11-19 LAB
ANION GAP SERPL CALCULATED.3IONS-SCNC: 1 MMOL/L (ref 3–14)
ANION GAP SERPL CALCULATED.3IONS-SCNC: <1 MMOL/L (ref 3–14)
ANISOCYTOSIS BLD QL SMEAR: SLIGHT
BASOPHILS # BLD AUTO: 0 10E9/L (ref 0–0.2)
BASOPHILS NFR BLD AUTO: 0 %
BUN SERPL-MCNC: 10 MG/DL (ref 7–30)
BUN SERPL-MCNC: 12 MG/DL (ref 7–30)
CALCIUM SERPL-MCNC: 7.5 MG/DL (ref 8.5–10.1)
CALCIUM SERPL-MCNC: 8.2 MG/DL (ref 8.5–10.1)
CHLORIDE SERPL-SCNC: 103 MMOL/L (ref 94–109)
CHLORIDE SERPL-SCNC: 103 MMOL/L (ref 94–109)
CO2 SERPL-SCNC: 33 MMOL/L (ref 20–32)
CO2 SERPL-SCNC: 35 MMOL/L (ref 20–32)
CREAT SERPL-MCNC: 1.1 MG/DL (ref 0.52–1.04)
CREAT SERPL-MCNC: 1.14 MG/DL (ref 0.52–1.04)
DIFFERENTIAL METHOD BLD: ABNORMAL
ELLIPTOCYTES BLD QL SMEAR: SLIGHT
EOSINOPHIL # BLD AUTO: 0 10E9/L (ref 0–0.7)
EOSINOPHIL NFR BLD AUTO: 0 %
ERYTHROCYTE [DISTWIDTH] IN BLOOD BY AUTOMATED COUNT: 17.1 % (ref 10–15)
GFR SERPL CREATININE-BSD FRML MDRD: 45 ML/MIN/1.7M2
GFR SERPL CREATININE-BSD FRML MDRD: 47 ML/MIN/1.7M2
GLUCOSE SERPL-MCNC: 104 MG/DL (ref 70–99)
GLUCOSE SERPL-MCNC: 150 MG/DL (ref 70–99)
HCT VFR BLD AUTO: 36.9 % (ref 35–47)
HGB BLD-MCNC: 11.3 G/DL (ref 11.7–15.7)
HGB BLD-MCNC: 9.9 G/DL (ref 11.7–15.7)
LACTATE BLD-SCNC: 0.8 MMOL/L (ref 0.7–2)
LYMPHOCYTES # BLD AUTO: 60.2 10E9/L (ref 0.8–5.3)
LYMPHOCYTES NFR BLD AUTO: 87 %
MCH RBC QN AUTO: 30.1 PG (ref 26.5–33)
MCHC RBC AUTO-ENTMCNC: 30.6 G/DL (ref 31.5–36.5)
MCV RBC AUTO: 98 FL (ref 78–100)
MONOCYTES # BLD AUTO: 4.2 10E9/L (ref 0–1.3)
MONOCYTES NFR BLD AUTO: 6 %
NEUTROPHILS # BLD AUTO: 4.8 10E9/L (ref 1.6–8.3)
NEUTROPHILS NFR BLD AUTO: 7 %
PLATELET # BLD AUTO: 67 10E9/L (ref 150–450)
PLATELET # BLD AUTO: 68 10E9/L (ref 150–450)
PLATELET # BLD EST: ABNORMAL 10*3/UL
POTASSIUM SERPL-SCNC: 4.8 MMOL/L (ref 3.4–5.3)
POTASSIUM SERPL-SCNC: 5 MMOL/L (ref 3.4–5.3)
RBC # BLD AUTO: 3.75 10E12/L (ref 3.8–5.2)
SODIUM SERPL-SCNC: 137 MMOL/L (ref 133–144)
SODIUM SERPL-SCNC: 138 MMOL/L (ref 133–144)
WBC # BLD AUTO: 69.2 10E9/L (ref 4–11)

## 2018-11-19 PROCEDURE — 25000125 ZZHC RX 250: Performed by: NURSE ANESTHETIST, CERTIFIED REGISTERED

## 2018-11-19 PROCEDURE — A9270 NON-COVERED ITEM OR SERVICE: HCPCS | Performed by: HOSPITALIST

## 2018-11-19 PROCEDURE — 25000125 ZZHC RX 250: Performed by: ORTHOPAEDIC SURGERY

## 2018-11-19 PROCEDURE — 0SRS01A REPLACEMENT OF LEFT HIP JOINT, FEMORAL SURFACE WITH METAL SYNTHETIC SUBSTITUTE, UNCEMENTED, OPEN APPROACH: ICD-10-PCS | Performed by: ORTHOPAEDIC SURGERY

## 2018-11-19 PROCEDURE — A9270 NON-COVERED ITEM OR SERVICE: HCPCS | Performed by: ORTHOPAEDIC SURGERY

## 2018-11-19 PROCEDURE — 40000306 ZZH STATISTIC PRE PROC ASSESS II: Performed by: ORTHOPAEDIC SURGERY

## 2018-11-19 PROCEDURE — 36000093 ZZH SURGERY LEVEL 4 1ST 30 MIN: Performed by: ORTHOPAEDIC SURGERY

## 2018-11-19 PROCEDURE — 80048 BASIC METABOLIC PNL TOTAL CA: CPT | Performed by: PHYSICIAN ASSISTANT

## 2018-11-19 PROCEDURE — 25000125 ZZHC RX 250: Performed by: ANESTHESIOLOGY

## 2018-11-19 PROCEDURE — 25000566 ZZH SEVOFLURANE, EA 15 MIN: Performed by: ORTHOPAEDIC SURGERY

## 2018-11-19 PROCEDURE — 36000063 ZZH SURGERY LEVEL 4 EA 15 ADDTL MIN: Performed by: ORTHOPAEDIC SURGERY

## 2018-11-19 PROCEDURE — 25000128 H RX IP 250 OP 636: Performed by: ORTHOPAEDIC SURGERY

## 2018-11-19 PROCEDURE — 40000274 ZZH STATISTIC RCP CONSULT EA 30 MIN

## 2018-11-19 PROCEDURE — 27210794 ZZH OR GENERAL SUPPLY STERILE: Performed by: ORTHOPAEDIC SURGERY

## 2018-11-19 PROCEDURE — 25000128 H RX IP 250 OP 636: Performed by: PHYSICIAN ASSISTANT

## 2018-11-19 PROCEDURE — 71000015 ZZH RECOVERY PHASE 1 LEVEL 2 EA ADDTL HR: Performed by: ORTHOPAEDIC SURGERY

## 2018-11-19 PROCEDURE — 25000132 ZZH RX MED GY IP 250 OP 250 PS 637: Mod: GY | Performed by: PHYSICIAN ASSISTANT

## 2018-11-19 PROCEDURE — 40000986 XR PELVIS AND HIP PORTABLE LEFT 1 VIEW

## 2018-11-19 PROCEDURE — 37000009 ZZH ANESTHESIA TECHNICAL FEE, EACH ADDTL 15 MIN: Performed by: ORTHOPAEDIC SURGERY

## 2018-11-19 PROCEDURE — 25000128 H RX IP 250 OP 636: Performed by: NURSE ANESTHETIST, CERTIFIED REGISTERED

## 2018-11-19 PROCEDURE — 80048 BASIC METABOLIC PNL TOTAL CA: CPT | Performed by: HOSPITALIST

## 2018-11-19 PROCEDURE — 25000132 ZZH RX MED GY IP 250 OP 250 PS 637: Performed by: ORTHOPAEDIC SURGERY

## 2018-11-19 PROCEDURE — 12000007 ZZH R&B INTERMEDIATE

## 2018-11-19 PROCEDURE — 25800025 ZZH RX 258: Performed by: ORTHOPAEDIC SURGERY

## 2018-11-19 PROCEDURE — 83605 ASSAY OF LACTIC ACID: CPT | Performed by: HOSPITALIST

## 2018-11-19 PROCEDURE — 36415 COLL VENOUS BLD VENIPUNCTURE: CPT | Performed by: PHYSICIAN ASSISTANT

## 2018-11-19 PROCEDURE — 85025 COMPLETE CBC W/AUTO DIFF WBC: CPT | Performed by: PHYSICIAN ASSISTANT

## 2018-11-19 PROCEDURE — 85018 HEMOGLOBIN: CPT | Performed by: HOSPITALIST

## 2018-11-19 PROCEDURE — 37000008 ZZH ANESTHESIA TECHNICAL FEE, 1ST 30 MIN: Performed by: ORTHOPAEDIC SURGERY

## 2018-11-19 PROCEDURE — 25000132 ZZH RX MED GY IP 250 OP 250 PS 637: Performed by: HOSPITALIST

## 2018-11-19 PROCEDURE — C1776 JOINT DEVICE (IMPLANTABLE): HCPCS | Performed by: ORTHOPAEDIC SURGERY

## 2018-11-19 PROCEDURE — 99233 SBSQ HOSP IP/OBS HIGH 50: CPT | Performed by: HOSPITALIST

## 2018-11-19 PROCEDURE — 36415 COLL VENOUS BLD VENIPUNCTURE: CPT | Performed by: HOSPITALIST

## 2018-11-19 PROCEDURE — 27810169 ZZH OR IMPLANT GENERAL: Performed by: ORTHOPAEDIC SURGERY

## 2018-11-19 PROCEDURE — 25000128 H RX IP 250 OP 636: Performed by: ANESTHESIOLOGY

## 2018-11-19 PROCEDURE — 85049 AUTOMATED PLATELET COUNT: CPT | Performed by: HOSPITALIST

## 2018-11-19 PROCEDURE — 71000014 ZZH RECOVERY PHASE 1 LEVEL 2 FIRST HR: Performed by: ORTHOPAEDIC SURGERY

## 2018-11-19 PROCEDURE — A9270 NON-COVERED ITEM OR SERVICE: HCPCS | Mod: GY | Performed by: PHYSICIAN ASSISTANT

## 2018-11-19 DEVICE — IMP HEAD FEMORAL SNR COBALT 28MM -3 71302803: Type: IMPLANTABLE DEVICE | Site: HIP | Status: FUNCTIONAL

## 2018-11-19 DEVICE — BONE CEMENT MIXING SYSTEM W/FEM PRESSURIZER 06065730000: Type: IMPLANTABLE DEVICE | Status: FUNCTIONAL

## 2018-11-19 DEVICE — IMP STEM FEM HIP SNN SYNERGY CEMENTED SZ 12 71316012: Type: IMPLANTABLE DEVICE | Site: HIP | Status: FUNCTIONAL

## 2018-11-19 DEVICE — BONE CEMENT RESTRICTOR BUCK FEMORAL 18.5MM 129418: Type: IMPLANTABLE DEVICE | Status: FUNCTIONAL

## 2018-11-19 DEVICE — IMP CENTRALIZER DISTAL BIPOLAR SNR INVIS 10MM 71313210: Type: IMPLANTABLE DEVICE | Site: HIP | Status: FUNCTIONAL

## 2018-11-19 DEVICE — IMPLANTABLE DEVICE: Type: IMPLANTABLE DEVICE | Site: HIP | Status: FUNCTIONAL

## 2018-11-19 DEVICE — BONE CEMENT SIMPLEX W/TOBRAMYCIN 6197-9-001: Type: IMPLANTABLE DEVICE | Site: HIP | Status: FUNCTIONAL

## 2018-11-19 DEVICE — BONE CEMENT SIMPLEX FULL DOSE 6191-1-001: Type: IMPLANTABLE DEVICE | Site: HIP | Status: FUNCTIONAL

## 2018-11-19 RX ORDER — OXYCODONE HYDROCHLORIDE 5 MG/1
5 TABLET ORAL
Status: DISCONTINUED | OUTPATIENT
Start: 2018-11-19 | End: 2018-11-21 | Stop reason: SINTOL

## 2018-11-19 RX ORDER — CEFAZOLIN SODIUM 1 G/50ML
1 INJECTION, SOLUTION INTRAVENOUS SEE ADMIN INSTRUCTIONS
Status: DISCONTINUED | OUTPATIENT
Start: 2018-11-19 | End: 2018-11-19 | Stop reason: HOSPADM

## 2018-11-19 RX ORDER — LIDOCAINE HYDROCHLORIDE 10 MG/ML
INJECTION, SOLUTION INFILTRATION; PERINEURAL PRN
Status: DISCONTINUED | OUTPATIENT
Start: 2018-11-19 | End: 2018-11-19

## 2018-11-19 RX ORDER — NALOXONE HYDROCHLORIDE 0.4 MG/ML
.1-.4 INJECTION, SOLUTION INTRAMUSCULAR; INTRAVENOUS; SUBCUTANEOUS
Status: DISCONTINUED | OUTPATIENT
Start: 2018-11-19 | End: 2018-11-22 | Stop reason: HOSPADM

## 2018-11-19 RX ORDER — FENTANYL CITRATE 50 UG/ML
INJECTION, SOLUTION INTRAMUSCULAR; INTRAVENOUS PRN
Status: DISCONTINUED | OUTPATIENT
Start: 2018-11-19 | End: 2018-11-19

## 2018-11-19 RX ORDER — ACETAMINOPHEN 325 MG/1
650 TABLET ORAL EVERY 4 HOURS PRN
Status: DISCONTINUED | OUTPATIENT
Start: 2018-11-22 | End: 2018-11-22 | Stop reason: HOSPADM

## 2018-11-19 RX ORDER — ONDANSETRON 4 MG/1
4 TABLET, ORALLY DISINTEGRATING ORAL EVERY 30 MIN PRN
Status: DISCONTINUED | OUTPATIENT
Start: 2018-11-19 | End: 2018-11-19 | Stop reason: HOSPADM

## 2018-11-19 RX ORDER — NALOXONE HYDROCHLORIDE 0.4 MG/ML
.1-.4 INJECTION, SOLUTION INTRAMUSCULAR; INTRAVENOUS; SUBCUTANEOUS
Status: DISCONTINUED | OUTPATIENT
Start: 2018-11-19 | End: 2018-11-19

## 2018-11-19 RX ORDER — CEFAZOLIN SODIUM 1 G/50ML
1 INJECTION, SOLUTION INTRAVENOUS EVERY 8 HOURS
Status: COMPLETED | OUTPATIENT
Start: 2018-11-19 | End: 2018-11-20

## 2018-11-19 RX ORDER — PROPOFOL 10 MG/ML
INJECTION, EMULSION INTRAVENOUS PRN
Status: DISCONTINUED | OUTPATIENT
Start: 2018-11-19 | End: 2018-11-19

## 2018-11-19 RX ORDER — HYDROMORPHONE HYDROCHLORIDE 1 MG/ML
.3-.5 INJECTION, SOLUTION INTRAMUSCULAR; INTRAVENOUS; SUBCUTANEOUS EVERY 5 MIN PRN
Status: DISCONTINUED | OUTPATIENT
Start: 2018-11-19 | End: 2018-11-19 | Stop reason: HOSPADM

## 2018-11-19 RX ORDER — DIMENHYDRINATE 50 MG/ML
25 INJECTION, SOLUTION INTRAMUSCULAR; INTRAVENOUS
Status: DISCONTINUED | OUTPATIENT
Start: 2018-11-19 | End: 2018-11-19 | Stop reason: HOSPADM

## 2018-11-19 RX ORDER — SODIUM CHLORIDE, SODIUM LACTATE, POTASSIUM CHLORIDE, CALCIUM CHLORIDE 600; 310; 30; 20 MG/100ML; MG/100ML; MG/100ML; MG/100ML
INJECTION, SOLUTION INTRAVENOUS CONTINUOUS
Status: DISCONTINUED | OUTPATIENT
Start: 2018-11-19 | End: 2018-11-19 | Stop reason: HOSPADM

## 2018-11-19 RX ORDER — LEVOTHYROXINE SODIUM 75 UG/1
75 TABLET ORAL DAILY
Status: DISCONTINUED | OUTPATIENT
Start: 2018-11-19 | End: 2018-11-22 | Stop reason: HOSPADM

## 2018-11-19 RX ORDER — CELECOXIB 100 MG/1
100 CAPSULE ORAL 2 TIMES DAILY
Status: DISCONTINUED | OUTPATIENT
Start: 2018-11-19 | End: 2018-11-20

## 2018-11-19 RX ORDER — NALOXONE HYDROCHLORIDE 0.4 MG/ML
INJECTION, SOLUTION INTRAMUSCULAR; INTRAVENOUS; SUBCUTANEOUS PRN
Status: DISCONTINUED | OUTPATIENT
Start: 2018-11-19 | End: 2018-11-19

## 2018-11-19 RX ORDER — ACETAMINOPHEN 325 MG/1
975 TABLET ORAL EVERY 8 HOURS
Status: DISCONTINUED | OUTPATIENT
Start: 2018-11-19 | End: 2018-11-22 | Stop reason: HOSPADM

## 2018-11-19 RX ORDER — PANTOPRAZOLE SODIUM 40 MG/1
40 TABLET, DELAYED RELEASE ORAL ONCE
Status: COMPLETED | OUTPATIENT
Start: 2018-11-19 | End: 2018-11-19

## 2018-11-19 RX ORDER — DIAZEPAM 10 MG/2ML
2.5 INJECTION, SOLUTION INTRAMUSCULAR; INTRAVENOUS
Status: DISCONTINUED | OUTPATIENT
Start: 2018-11-19 | End: 2018-11-19 | Stop reason: HOSPADM

## 2018-11-19 RX ORDER — ALBUTEROL SULFATE 0.83 MG/ML
2.5 SOLUTION RESPIRATORY (INHALATION) EVERY 4 HOURS PRN
Status: DISCONTINUED | OUTPATIENT
Start: 2018-11-19 | End: 2018-11-19

## 2018-11-19 RX ORDER — LIDOCAINE 40 MG/G
CREAM TOPICAL
Status: DISCONTINUED | OUTPATIENT
Start: 2018-11-19 | End: 2018-11-22 | Stop reason: HOSPADM

## 2018-11-19 RX ORDER — ONDANSETRON 2 MG/ML
4 INJECTION INTRAMUSCULAR; INTRAVENOUS EVERY 6 HOURS PRN
Status: DISCONTINUED | OUTPATIENT
Start: 2018-11-19 | End: 2018-11-22 | Stop reason: HOSPADM

## 2018-11-19 RX ORDER — EPHEDRINE SULFATE 50 MG/ML
INJECTION, SOLUTION INTRAMUSCULAR; INTRAVENOUS; SUBCUTANEOUS PRN
Status: DISCONTINUED | OUTPATIENT
Start: 2018-11-19 | End: 2018-11-19

## 2018-11-19 RX ORDER — SODIUM CHLORIDE 9 MG/ML
INJECTION, SOLUTION INTRAVENOUS CONTINUOUS
Status: DISCONTINUED | OUTPATIENT
Start: 2018-11-19 | End: 2018-11-20

## 2018-11-19 RX ORDER — ALBUTEROL SULFATE 0.83 MG/ML
2.5 SOLUTION RESPIRATORY (INHALATION) EVERY 4 HOURS PRN
Status: DISCONTINUED | OUTPATIENT
Start: 2018-11-19 | End: 2018-11-19 | Stop reason: HOSPADM

## 2018-11-19 RX ORDER — MEPERIDINE HYDROCHLORIDE 50 MG/ML
12.5 INJECTION INTRAMUSCULAR; INTRAVENOUS; SUBCUTANEOUS EVERY 5 MIN PRN
Status: DISCONTINUED | OUTPATIENT
Start: 2018-11-19 | End: 2018-11-19 | Stop reason: HOSPADM

## 2018-11-19 RX ORDER — GLYCINE 1.5 G/100ML
SOLUTION IRRIGATION PRN
Status: DISCONTINUED | OUTPATIENT
Start: 2018-11-19 | End: 2018-11-19 | Stop reason: HOSPADM

## 2018-11-19 RX ORDER — GLYCOPYRROLATE 0.2 MG/ML
INJECTION, SOLUTION INTRAMUSCULAR; INTRAVENOUS PRN
Status: DISCONTINUED | OUTPATIENT
Start: 2018-11-19 | End: 2018-11-19

## 2018-11-19 RX ORDER — NEOSTIGMINE METHYLSULFATE 1 MG/ML
VIAL (ML) INJECTION PRN
Status: DISCONTINUED | OUTPATIENT
Start: 2018-11-19 | End: 2018-11-19

## 2018-11-19 RX ORDER — LABETALOL HYDROCHLORIDE 5 MG/ML
10 INJECTION, SOLUTION INTRAVENOUS
Status: DISCONTINUED | OUTPATIENT
Start: 2018-11-19 | End: 2018-11-19 | Stop reason: HOSPADM

## 2018-11-19 RX ORDER — CEFAZOLIN SODIUM 2 G/100ML
2 INJECTION, SOLUTION INTRAVENOUS
Status: COMPLETED | OUTPATIENT
Start: 2018-11-19 | End: 2018-11-19

## 2018-11-19 RX ORDER — ONDANSETRON 2 MG/ML
4 INJECTION INTRAMUSCULAR; INTRAVENOUS EVERY 30 MIN PRN
Status: DISCONTINUED | OUTPATIENT
Start: 2018-11-19 | End: 2018-11-19 | Stop reason: HOSPADM

## 2018-11-19 RX ORDER — AMOXICILLIN 250 MG
1 CAPSULE ORAL 2 TIMES DAILY
Status: DISCONTINUED | OUTPATIENT
Start: 2018-11-19 | End: 2018-11-22 | Stop reason: HOSPADM

## 2018-11-19 RX ORDER — ZOLPIDEM TARTRATE 5 MG/1
5 TABLET ORAL
Status: DISCONTINUED | OUTPATIENT
Start: 2018-11-20 | End: 2018-11-22 | Stop reason: HOSPADM

## 2018-11-19 RX ORDER — AMOXICILLIN 250 MG
2 CAPSULE ORAL 2 TIMES DAILY
Status: DISCONTINUED | OUTPATIENT
Start: 2018-11-19 | End: 2018-11-22 | Stop reason: HOSPADM

## 2018-11-19 RX ORDER — KETOROLAC TROMETHAMINE 30 MG/ML
INJECTION, SOLUTION INTRAMUSCULAR; INTRAVENOUS PRN
Status: DISCONTINUED | OUTPATIENT
Start: 2018-11-19 | End: 2018-11-19

## 2018-11-19 RX ORDER — ATORVASTATIN CALCIUM 20 MG/1
20 TABLET, FILM COATED ORAL DAILY
Status: DISCONTINUED | OUTPATIENT
Start: 2018-11-19 | End: 2018-11-22 | Stop reason: HOSPADM

## 2018-11-19 RX ORDER — HYDROXYZINE HYDROCHLORIDE 25 MG/1
25 TABLET, FILM COATED ORAL 3 TIMES DAILY PRN
Status: DISCONTINUED | OUTPATIENT
Start: 2018-11-19 | End: 2018-11-22 | Stop reason: HOSPADM

## 2018-11-19 RX ORDER — IPRATROPIUM BROMIDE AND ALBUTEROL SULFATE 2.5; .5 MG/3ML; MG/3ML
3 SOLUTION RESPIRATORY (INHALATION) ONCE
Status: COMPLETED | OUTPATIENT
Start: 2018-11-19 | End: 2018-11-19

## 2018-11-19 RX ORDER — FENTANYL CITRATE 50 UG/ML
25-50 INJECTION, SOLUTION INTRAMUSCULAR; INTRAVENOUS
Status: DISCONTINUED | OUTPATIENT
Start: 2018-11-19 | End: 2018-11-19 | Stop reason: HOSPADM

## 2018-11-19 RX ORDER — HYDROMORPHONE HYDROCHLORIDE 1 MG/ML
0.2 INJECTION, SOLUTION INTRAMUSCULAR; INTRAVENOUS; SUBCUTANEOUS
Status: DISCONTINUED | OUTPATIENT
Start: 2018-11-19 | End: 2018-11-22 | Stop reason: HOSPADM

## 2018-11-19 RX ORDER — DEXAMETHASONE SODIUM PHOSPHATE 4 MG/ML
INJECTION, SOLUTION INTRA-ARTICULAR; INTRALESIONAL; INTRAMUSCULAR; INTRAVENOUS; SOFT TISSUE PRN
Status: DISCONTINUED | OUTPATIENT
Start: 2018-11-19 | End: 2018-11-19

## 2018-11-19 RX ORDER — ONDANSETRON 4 MG/1
4 TABLET, ORALLY DISINTEGRATING ORAL EVERY 6 HOURS PRN
Status: DISCONTINUED | OUTPATIENT
Start: 2018-11-19 | End: 2018-11-22 | Stop reason: HOSPADM

## 2018-11-19 RX ORDER — ONDANSETRON 2 MG/ML
4 INJECTION INTRAMUSCULAR; INTRAVENOUS EVERY 6 HOURS
Status: DISCONTINUED | OUTPATIENT
Start: 2018-11-19 | End: 2018-11-20

## 2018-11-19 RX ADMIN — Medication 0.2 MG: at 00:09

## 2018-11-19 RX ADMIN — OXYCODONE HYDROCHLORIDE 5 MG: 5 TABLET ORAL at 06:40

## 2018-11-19 RX ADMIN — ONDANSETRON 4 MG: 2 INJECTION INTRAMUSCULAR; INTRAVENOUS at 16:36

## 2018-11-19 RX ADMIN — GABAPENTIN 300 MG: 300 CAPSULE ORAL at 00:11

## 2018-11-19 RX ADMIN — Medication 1 G: at 09:25

## 2018-11-19 RX ADMIN — PHENYLEPHRINE HYDROCHLORIDE 200 MCG: 10 INJECTION, SOLUTION INTRAMUSCULAR; INTRAVENOUS; SUBCUTANEOUS at 10:24

## 2018-11-19 RX ADMIN — DEXAMETHASONE SODIUM PHOSPHATE 8 MG: 4 INJECTION, SOLUTION INTRA-ARTICULAR; INTRALESIONAL; INTRAMUSCULAR; INTRAVENOUS; SOFT TISSUE at 09:17

## 2018-11-19 RX ADMIN — NALOXONE HYDROCHLORIDE 40 MCG: 0.4 INJECTION, SOLUTION INTRAMUSCULAR; INTRAVENOUS; SUBCUTANEOUS at 11:11

## 2018-11-19 RX ADMIN — PHENYLEPHRINE HYDROCHLORIDE 100 MCG: 10 INJECTION, SOLUTION INTRAMUSCULAR; INTRAVENOUS; SUBCUTANEOUS at 09:37

## 2018-11-19 RX ADMIN — Medication 2.5 MG: at 10:18

## 2018-11-19 RX ADMIN — PHENYLEPHRINE HYDROCHLORIDE 100 MCG: 10 INJECTION, SOLUTION INTRAMUSCULAR; INTRAVENOUS; SUBCUTANEOUS at 09:42

## 2018-11-19 RX ADMIN — GLYCOPYRROLATE 0.2 MG: 0.2 INJECTION, SOLUTION INTRAMUSCULAR; INTRAVENOUS at 10:58

## 2018-11-19 RX ADMIN — SODIUM CHLORIDE, POTASSIUM CHLORIDE, SODIUM LACTATE AND CALCIUM CHLORIDE: 600; 310; 30; 20 INJECTION, SOLUTION INTRAVENOUS at 09:44

## 2018-11-19 RX ADMIN — PHENYLEPHRINE HYDROCHLORIDE 100 MCG: 10 INJECTION, SOLUTION INTRAMUSCULAR; INTRAVENOUS; SUBCUTANEOUS at 10:18

## 2018-11-19 RX ADMIN — Medication 0.5 MG: at 09:17

## 2018-11-19 RX ADMIN — FENTANYL CITRATE 50 MCG: 50 INJECTION, SOLUTION INTRAMUSCULAR; INTRAVENOUS at 11:46

## 2018-11-19 RX ADMIN — CEFAZOLIN SODIUM 1 G: 1 INJECTION, SOLUTION INTRAVENOUS at 16:49

## 2018-11-19 RX ADMIN — TRAZODONE HYDROCHLORIDE 100 MG: 100 TABLET ORAL at 00:11

## 2018-11-19 RX ADMIN — PHENYLEPHRINE HYDROCHLORIDE 100 MCG: 10 INJECTION, SOLUTION INTRAMUSCULAR; INTRAVENOUS; SUBCUTANEOUS at 10:13

## 2018-11-19 RX ADMIN — CELECOXIB 100 MG: 100 CAPSULE ORAL at 21:10

## 2018-11-19 RX ADMIN — IPRATROPIUM BROMIDE AND ALBUTEROL SULFATE 3 ML: .5; 3 SOLUTION RESPIRATORY (INHALATION) at 08:27

## 2018-11-19 RX ADMIN — ROCURONIUM BROMIDE 10 MG: 10 INJECTION INTRAVENOUS at 10:02

## 2018-11-19 RX ADMIN — PHENYLEPHRINE HYDROCHLORIDE 100 MCG: 10 INJECTION, SOLUTION INTRAMUSCULAR; INTRAVENOUS; SUBCUTANEOUS at 09:31

## 2018-11-19 RX ADMIN — Medication 2.5 MG: at 10:09

## 2018-11-19 RX ADMIN — PHENYLEPHRINE HYDROCHLORIDE 100 MCG: 10 INJECTION, SOLUTION INTRAMUSCULAR; INTRAVENOUS; SUBCUTANEOUS at 10:06

## 2018-11-19 RX ADMIN — ACETAMINOPHEN 975 MG: 325 TABLET, FILM COATED ORAL at 16:35

## 2018-11-19 RX ADMIN — PROPOFOL 150 MG: 10 INJECTION, EMULSION INTRAVENOUS at 09:17

## 2018-11-19 RX ADMIN — Medication 2 MG: at 10:58

## 2018-11-19 RX ADMIN — PANTOPRAZOLE SODIUM 40 MG: 40 TABLET, DELAYED RELEASE ORAL at 07:50

## 2018-11-19 RX ADMIN — CEFAZOLIN SODIUM 2 G: 2 INJECTION, SOLUTION INTRAVENOUS at 09:11

## 2018-11-19 RX ADMIN — FENTANYL CITRATE 100 MCG: 50 INJECTION, SOLUTION INTRAMUSCULAR; INTRAVENOUS at 09:17

## 2018-11-19 RX ADMIN — ROCURONIUM BROMIDE 40 MG: 10 INJECTION INTRAVENOUS at 09:17

## 2018-11-19 RX ADMIN — PHENYLEPHRINE HYDROCHLORIDE 200 MCG: 10 INJECTION, SOLUTION INTRAMUSCULAR; INTRAVENOUS; SUBCUTANEOUS at 10:35

## 2018-11-19 RX ADMIN — SODIUM CHLORIDE: 9 INJECTION, SOLUTION INTRAVENOUS at 15:23

## 2018-11-19 RX ADMIN — ACETAMINOPHEN 975 MG: 325 TABLET, FILM COATED ORAL at 23:59

## 2018-11-19 RX ADMIN — KETOROLAC TROMETHAMINE 15 MG: 30 INJECTION, SOLUTION INTRAMUSCULAR at 09:17

## 2018-11-19 RX ADMIN — LEVOTHYROXINE SODIUM 75 MCG: 75 TABLET ORAL at 16:35

## 2018-11-19 RX ADMIN — GLYCOPYRROLATE 0.2 MG: 0.2 INJECTION, SOLUTION INTRAMUSCULAR; INTRAVENOUS at 09:17

## 2018-11-19 RX ADMIN — RANITIDINE 150 MG: 150 TABLET ORAL at 21:10

## 2018-11-19 RX ADMIN — PHENYLEPHRINE HYDROCHLORIDE 100 MCG: 10 INJECTION, SOLUTION INTRAMUSCULAR; INTRAVENOUS; SUBCUTANEOUS at 09:40

## 2018-11-19 RX ADMIN — SODIUM CHLORIDE, POTASSIUM CHLORIDE, SODIUM LACTATE AND CALCIUM CHLORIDE: 600; 310; 30; 20 INJECTION, SOLUTION INTRAVENOUS at 01:24

## 2018-11-19 RX ADMIN — OXYCODONE HYDROCHLORIDE 5 MG: 5 TABLET ORAL at 03:40

## 2018-11-19 RX ADMIN — SODIUM CHLORIDE, POTASSIUM CHLORIDE, SODIUM LACTATE AND CALCIUM CHLORIDE: 600; 310; 30; 20 INJECTION, SOLUTION INTRAVENOUS at 11:13

## 2018-11-19 RX ADMIN — OXYCODONE HYDROCHLORIDE 5 MG: 5 TABLET ORAL at 00:46

## 2018-11-19 RX ADMIN — LIDOCAINE HYDROCHLORIDE 30 MG: 10 INJECTION, SOLUTION INFILTRATION; PERINEURAL at 09:17

## 2018-11-19 RX ADMIN — CEFAZOLIN SODIUM 1 G: 1 INJECTION, SOLUTION INTRAVENOUS at 23:59

## 2018-11-19 RX ADMIN — ATORVASTATIN CALCIUM 20 MG: 20 TABLET, FILM COATED ORAL at 16:35

## 2018-11-19 RX ADMIN — SODIUM CHLORIDE, POTASSIUM CHLORIDE, SODIUM LACTATE AND CALCIUM CHLORIDE: 600; 310; 30; 20 INJECTION, SOLUTION INTRAVENOUS at 09:11

## 2018-11-19 RX ADMIN — PHENYLEPHRINE HYDROCHLORIDE 100 MCG: 10 INJECTION, SOLUTION INTRAMUSCULAR; INTRAVENOUS; SUBCUTANEOUS at 09:34

## 2018-11-19 RX ADMIN — ONDANSETRON 4 MG: 2 INJECTION INTRAMUSCULAR; INTRAVENOUS at 09:17

## 2018-11-19 RX ADMIN — Medication 2.5 MG: at 10:35

## 2018-11-19 ASSESSMENT — ACTIVITIES OF DAILY LIVING (ADL)
ADLS_ACUITY_SCORE: 11
ADLS_ACUITY_SCORE: 15

## 2018-11-19 ASSESSMENT — COPD QUESTIONNAIRES
CAT_SEVERITY: MODERATE
COPD: 1

## 2018-11-19 ASSESSMENT — LIFESTYLE VARIABLES: TOBACCO_USE: 1

## 2018-11-19 ASSESSMENT — NEW YORK HEART ASSOCIATION (NYHA) CLASSIFICATION: NYHA FUNCTIONAL CLASS: I

## 2018-11-19 NOTE — PROGRESS NOTES
[facility]    Hospitalist Progress Note  Name: Marie Kline    MRN: [id number]  Provider:  Wenceslao Craig DO MPH  Date of Service: 11/19/2018    Summary of Stay: Marie Kline is a 86 year old female with a PMH significant for CLL, bladder cancer, stress cardiomyopathy 10/2014 with complete resolution (EF 60-65% echo 3/2017), COPD, hypothyroidism, HLD, depression who presented for evaluation of hip pain after a fall.  Underwent operative repair this afternoon with reported 100 cc of blood loss.  Was hypotensive throughout the procedure which has continued since reaching the floor.  She currently feels well and has no complaints.     1. Left femoral neck fracture secondary to mechanical fall: Patient was going into her living room to make a phone call and while walking in the dark ran into the glass coffee table and tripped over it, scraping her shins and landing on her left hip. She thinks she heard a crack when she fell. She was unable to get up or bear any weight since that injury and activated her Life Alert to be brought to the ER.  Underwent operative repair today.  Currently hypotensive although otherwise asymptomatic and stable.  -Appreciate orthopedic evaluation  -Check stat hemoglobin, lactate, BMP  -Prn analgesics and antiemetics, minimize narcotics due to hypotension  -Enoxaparin for DVT prophylaxis per orthopedics  -PT and OT when appropriate likely needs TCU  -Continue maintenance IV fluids     2. Lower extremity skin tears: Large skin tears over shins bilaterally, bandaged with steri-strips by ER physician. No active bleeding.     3. Right inguinal pain/swelling: In setting of previous excisional biopsy for right inguinal adenopathy performed by Dr. De Paz, done at request of Dr. Ivory to assess if progression or transformation of her disease. Not clear what results showed/indication but patient reports she discussed with Dr. Ivory.      4. CLL: At least stage III per oncology notes.  Diagnosed in 4/2007 and has been receiving IVIG every 4 weeks, last infusion was 10/31. Has chronic high white count. Has persistent right inguinal adenopathy as well as mediastinal and cervical adenopathy. Excisional biopsy of right inguinal adenopathy was completed 10/23 to assess if progression or transformation of her disease. Dr. Ivory has recommended chemotherapy but patient has chosen forgo doing this until after the holiday season, so they are re-visiting this in the next calendar year.     5. Bladder cancer: Having BCG treatment w/ Dr. Nuñez. F/u cysto 5/2016 without evidence of recurrence so receives 3 weekly doses of BCG maintenance therapy. 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 She gets cystos every 6 months for monitoring.     6. History of stress-induced cardiomyopathy: History of stress cardiomyopathy 10/2014 in setting of selling her home (dream home where she lived with her ) with subsequent resolution of wall motion abnormalities and normalization of LV function. Coronary angiogram at that time showed normal coronary arteries. Has followed w/ Dr. Ortega in the past and remains on beta blocker therapy. Echo 3/2017 showed EF of 60-65%.  -Hold PTA Toprol given hypotension  -Hold lasix   -Monitor for volume overload is giving IV fluids     7. COPD: Former smoker of 60 pack years, quit in 2009. Worsening of chronic cough with more production than her baseline. Using an albuterol nebulizer, Breo, and Ventolin inhaler routinely. Has not established care with a pulmonologist yet, but did receive a referral from oncology clinic to do so. Not on home O2 at baseline.  -Continue PTA albuterol 2 puffs Q6H prn for wheezing, BID Symbicort.  -Prn duonebs for wheezing     8. Hypothyroidism: Continue levothyroxine 75 mcg daily.     9. HLD: Continue atorvastatin 20 mg daily.     10. Insomnia: Continue PTA trazodone.    DVT Prophylaxis: Enoxaparin (Lovenox) SQ  Code  Status: Prior  Disposition: Expected discharge in 2-3 days to TCU. Goals prior to discharge include rehab.   Incidental Findings: None.  Family updated today: Yes      Interval History   Assumed care from previous hospitalist. The history was fully reviewed.  Just returned from the operating room.  Feels well and has no complaints.  Surgery went well without complications.  Currently hypotensive with his systolic blood pressure in the 80s.    -Data reviewed today: I personally reviewed all new labs and imaging results over the last 24 hours.     Physical Exam   Temp: 95.6  F (35.3  C) Temp src: Oral BP: (!) 80/43 Pulse: 71 Heart Rate: 78 Resp: 15 SpO2: 94 % O2 Device: Nasal cannula Oxygen Delivery: 4 LPM  Vitals:    11/18/18 1833   Weight: 47.2 kg (104 lb)     Vital Signs with Ranges  Temp:  [95.6  F (35.3  C)-99.5  F (37.5  C)] 95.6  F (35.3  C)  Pulse:  [71-81] 71  Heart Rate:  [59-78] 78  Resp:  [5-29] 15  BP: ()/(33-91) 80/43  FiO2 (%):  [100 %] 100 %  SpO2:  [88 %-100 %] 94 %  I/O last 3 completed shifts:  In: 2747 [P.O.:50; I.V.:2697]  Out: 1125 [Urine:1025; Blood:100]    GENERAL: No apparent distress. Awake, alert, and fully oriented.  HEENT: Normocephalic, atraumatic. Extraocular movements intact.  CARDIOVASCULAR: Regular rate and rhythm without murmurs or rubs. No S3.  PULMONARY: Clear bilaterally.  GASTROINTESTINAL: Soft, non-tender, non-distended. Bowel sounds normoactive.   EXTREMITIES: No cyanosis or clubbing. No edema.  NEUROLOGICAL: CN 2-12 grossly intact, no focal neurological deficits.  DERMATOLOGICAL: No rash, ulcer, bruising, nor jaundice.     Medications     sodium chloride 100 mL/hr at 11/19/18 1523       acetaminophen  975 mg Oral Q8H     atorvastatin  20 mg Oral Daily     ceFAZolin  1 g Intravenous Q8H     celecoxib  100 mg Oral BID     [START ON 11/20/2018] enoxaparin  30 mg Subcutaneous Q24H     fluticasone-vilanterol  1 puff Inhalation Daily     levothyroxine  75 mcg Oral Daily      ondansetron  4 mg Intravenous Q6H     ranitidine  150 mg Oral BID     senna-docusate  1 tablet Oral BID    Or     senna-docusate  2 tablet Oral BID     sodium chloride (PF)  3 mL Intracatheter Q8H     Data     Laboratory:    Recent Labs  Lab 11/19/18  0731 11/18/18 1924   WBC 69.2* 73.5*   HGB 11.3* 12.0   HCT 36.9 38.2   MCV 98 96   PLT 68* 79*       Recent Labs  Lab 11/19/18  0731 11/18/18 1924    140   POTASSIUM 4.8 4.3   CHLORIDE 103 103   CO2 35* 33*   ANIONGAP <1* 4   * 105*   BUN 10 11   CR 1.14* 1.04   GFRESTIMATED 45* 50*   GFRESTBLACK 55* 61   CARLOS 8.2* 8.2*     No results for input(s): CULT in the last 168 hours.    Imaging:  Recent Results (from the past 24 hour(s))   XR Pelvis and Hip Left 2 Views    Narrative    PELVIS WITH UNILATERAL HIP TWO VIEWS LEFT  11/18/2018 7:41 PM     HISTORY: Fall, left hip pain.     COMPARISON: None.      Impression    IMPRESSION: Left femoral neck fracture.    TANISHA PRIETO MD   XR Pelvis w Hip Port Left 1 View    Narrative    PELVIS AND HIP PORTABLE LEFT ONE VIEW   11/19/2018 11:52 AM     HISTORY: Postoperative.     COMPARISON: 11/18/2018      Impression    IMPRESSION: Interval left total hip replacement. No fracture or  dislocation. Right hip unremarkable.    MD Tanisha WAGNER DO MPH  Formerly [facility] Hospitalist  [address] [facility] [place] [address]  Pager: [phone]  11/19/2018

## 2018-11-19 NOTE — PHARMACY-ADMISSION MEDICATION HISTORY
Admission medication history interview status for this patient is complete. See [facility] admission navigator for allergy information, prior to admission medications and immunization status.     Medication history interview source(s):Patient and family  Medication history resources (including written lists, pill bottles, clinic record):None    Changes made to PTA medication list:  Added: albuterol neb  Deleted: none  Changed: gabapentin to BID scheduled, trazodone to 100 mg at bedtime scheduled     Actions taken by pharmacist (provider contacted, etc):None     Additional medication history information:None    Medication reconciliation/reorder completed by provider prior to medication history? No    Prior to Admission medications    Medication Sig Last Dose Taking? Auth Provider   albuterol (2.5 MG/3ML) 0.083% neb solution Take 2.5 mg by nebulization 2 times daily 11/18/2018 at am Yes Reported, Patient   albuterol (PROAIR HFA/PROVENTIL HFA/VENTOLIN HFA) 108 (90 BASE) MCG/ACT Inhaler Inhale 2 puffs into the lungs every 6 hours as needed for wheezing  Yes Piper Kiser MD   atorvastatin (LIPITOR) 20 MG tablet TAKE ONE TABLET BY MOUTH ONE TIME DAILY  11/18/2018 Yes Piper Kiser MD   budesonide-formoterol (SYMBICORT) 80-4.5 MCG/ACT Inhaler Inhale 2 puffs into the lungs 2 times daily 11/18/2018 at am Yes Piper Kiser MD   denosumab (PROLIA) 60 MG/ML SOLN injection Inject 1 mL (60 mg) Subcutaneous every 6 months Past Month Yes Piper Kiser MD   ferrous sulfate (IRON) 325 (65 FE) MG tablet Take by mouth daily (with breakfast) 11/18/2018 Yes Reported, Patient   furosemide (LASIX) 20 MG tablet TAKE ONE TABLET BY MOUTH ONE TIME DAILY  11/18/2018 Yes Leah Ventura, NP   gabapentin (NEURONTIN) 300 MG capsule Take 300 mg by mouth 2 times daily 11/18/2018 at am Yes Unknown, Entered By History   levothyroxine (SYNTHROID/LEVOTHROID) 75 MCG tablet TAKE ONE TABLET BY MOUTH ONE TIME DAILY  11/18/2018  Yes Piper Kiser MD   metoprolol succinate (TOPROL-XL) 25 MG 24 hr tablet Take 1 tablet (25 mg) by mouth daily 11/18/2018 Yes Piper Kiser MD   Multiple Vitamins-Minerals (MULTIVITAMIN GUMMIES ADULT PO) Take by mouth daily  11/18/2018 Yes Reported, Patient   traZODone (DESYREL) 50 MG tablet Take 100 mg by mouth At Bedtime 11/17/2018 Yes Reported, Patient   order for DME Equipment being ordered: Nebulizer  Fax order to [facility] [phone]   Piper Kiser MD

## 2018-11-19 NOTE — PROGRESS NOTES
"[facility] RCAT     Date:11/19/2018  Admission Dx:Fall  Pulmonary History:COPD  Home Nebulizer/MDI Use:Alb as needed per pt, Symbicort BID  Home Oxygen:2L @ noc  Acuity Level (RCAT flow sheet):4  Aerosol Therapy initiated:Alb Q4 prn, Alb mdi prn      Pulmonary Hygiene initiated:Deep breath and coughing techniques      Volume Expansion initiated:IS      Current Oxygen Requirements:2L  Current SpO2:91%    Re-evaluation date:11/21/2018    Patient Education:Education performed with patient in regards to indications/benefits of bronchodilators. Will continue to educate with patient as needed.         See \"RT Assessments\" flow sheet for patient assessment scoring and Acuity Level Details.             "

## 2018-11-19 NOTE — ANESTHESIA CARE TRANSFER NOTE
Patient: Marie Kline    Procedure(s):  OPEN REDUCTION INTERNAL FIXATION HIP BIPOLAR    Diagnosis: Left femoral neck fracture  Diagnosis Additional Information: No value filed.    Anesthesia Type:   General, ETT, LMA     Note:  Airway :Face Mask  Patient transferred to:PACU  Comments: Spont Resps. Follows commands. Extubated. Maintains Resps. Tx to pacu. Report to RNHandoff Report: Identifed the Patient, Identified the Reponsible Provider, Reviewed the pertinent medical history, Discussed the surgical course, Reviewed Intra-OP anesthesia mangement and issues during anesthesia, Set expectations for post-procedure period and Allowed opportunity for questions and acknowledgement of understanding      Vitals: (Last set prior to Anesthesia Care Transfer)    CRNA VITALS  11/19/2018 1045 - 11/19/2018 1120      11/19/2018             Pulse: 68    SpO2: 100 %    Resp Rate (observed): (!)  7                Electronically Signed By: NOHEMI Peace CRNA  November 19, 2018  11:20 AM

## 2018-11-19 NOTE — PLAN OF CARE
Problem: Patient Care Overview  Goal: Plan of Care/Patient Progress Review  Outcome: No Change  Arrived to the floor at 2200 via cart from ED. Call light and room reviewed with patient. A&O x4. VSS on 2LPM of O2 via NC. LS diminished but CTA. BS hypoactive. Weak D/P, otherwise CMS intact to LLE. Surgical scrub perfomed. Bedrest, repositions with A2. NPO with meds since 0000. IV dilaudid and PO oxycodone 5mg managing pain. voiding in good amts via tatum catheter. US order was placed incorrectly, MD to review and order as appropriate in AM per Admitting hospitalist. Awaiting ortho consult. Will continue to monitor.

## 2018-11-19 NOTE — ED NOTES
Bed: ED10  Expected date: 11/18/18  Expected time: 6:19 PM  Means of arrival: Ambulance  Comments:  BV2 86F fall

## 2018-11-19 NOTE — CONSULTS
Consult Date:  11/18/2018      DIAGNOSIS:  A left displaced femoral neck fracture.      HISTORY OF PRESENT ILLNESS:  Ms. Kline is a very pleasant 86-year-old female who presented with left hip pain after a fall.  She was walking in her living room which was dark and actually walked into a glass coffee table, causing her to trip and land on her hip.  After the fall, she was not able to bear weight on the hip.  She presented to the Emergency Department at [facility] and found to have a left displaced femoral neck fracture and was admitted for definitive care.      PAST MEDICAL AND SURGICAL HISTORY:  Significant for arthritis, bladder cancer, cardiomyopathy, chronic lymphocytic leukemia, congestive heart failure, COPD, hypertension, hypothyroid, myocardial infarction, pulmonary edema and tricuspid valve disorders.      ALLERGIES:  Contrast dye and wound dressing adhesive.      MEDICATIONS ON ADMISSION:  She is on albuterol, Lipitor, Symbicort, Prolia, iron, Lasix, Neurontin, Synthroid, Toprol and Desyrel.      FAMILY HISTORY:  Noncontributory.      SOCIAL HISTORY:  She is a former smoker, quit in 1996, does not use alcohol and is .      REVIEW OF SYSTEMS:  Ten-point review of systems is positive for left hip arthralgias and skin tears on her shins.      PHYSICAL EXAMINATION:   GENERAL:  She is lying in her hospital bed in no apparent distress and afebrile.   VITAL SIGNS:  Stable.  No obvious head trauma.   EXTREMITIES:  Right and left upper extremities are within normal limits.  Skin is clean, dry and intact.  Palpable radial pulses.  Radial, ulnar and median nerve sensation and function intact bilaterally.  Right and left lower extremity skin tears on her shins.  She is shortened and externally rotated.  Tender to palpation around the left hip.  She has full function of EHL, FHL, tibialis anterior and gastroc soleus.  He has full sensation in superficial peroneal, deep peroneus, saphenous, tibial  and sural nerves bilaterally.  She has palpable dorsalis pedis pulses.      IMAGING:  X-rays of her left AP pelvis and left lateral hip shows a left femoral neck fracture, mildly displaced.      LABORATORY DATA:  On admission, sodium is 140 and potassium 4.3.  White blood cell count 73.5 and hemoglobin 12.0.      IMPRESSION AND PLAN:  I had a long discussion with Ms. Kline regarding her left femoral neck fracture.  This displaced femoral neck fracture will do well with a bipolar hemiarthroplasty for both, pain management and increase mobility.  She understands the risks, benefits, alternatives and wishes to proceed with surgery.  We will make arrangements for surgery later today.         AAYUSH ELIZABETH MD             D: 2018   T: 2018   MT: JALEESA      Name:     MAGGI KLINE   MRN:      [id number]        Account:       [id number]   :      1932           Consult Date:  2018      Document: [id number]

## 2018-11-19 NOTE — ED TRIAGE NOTES
86-year-old female presents to the ER with complaints of left hip pain and skin tears to bilateral lower extremities after tripping and falling into her glass table.

## 2018-11-19 NOTE — ED PROVIDER NOTES
History     Chief Complaint:  Fall and Leg Injury    HPI   Marie Kline is a 86 year old female with extensive history who presents with hip pain after a fall. The patient recounted that she walked into her living room which was dark and accidentally walked into a glass coffee table causing her to trip and land on her left hip. The patient was able to get up after the fall but she couldn't put any pressure on her hip.  Her left hip is comfortable while she is laying in bed and not moving it but it hurts when she tries to bend her leg.  The patient has skin tears on both shins and experiences a lot of pain when she moves her hip.  She did not hit her head during the fall.  She had no loss of consciousness.  No headache.  She did not injure her ribs.  No chest pain either preceding the fall or since.  No palpitations or arrhythmias.    No other recent illnesses.  No fevers.    Allergies:  Contrast Dye  Wound Dressing Adhesive     Medications:    Albuterol  Lipitor  Symbicort  Prilia  Iron  Lasix  Neurontin  Synthroid  Toprol  Desyrel    Past Medical History:    Arthritis  Bladder cancer  Cardiomyopathy  Chronic lymphocytic leukemia  Congestive heart failure  COPD  Hypertension  Hypothyroid  Mumps  Myocardial infarction  Pulmonary edema cardiac cause  Tricuspid valve disorders    Past Surgical History:    Bladder Surgery    Family History:    Cerebrovascular Disease  Cancer    Social History:  Patient is a former smoker who quit in 1996. The patient does not drink alcohol.   Marital Status:   [5]     Review of Systems   Musculoskeletal: Positive for arthralgias.   Skin: Positive for wound.   All other systems reviewed and are negative.    Physical Exam     Patient Vitals for the past 24 hrs:   BP Temp Temp src Pulse Resp SpO2 Weight   11/18/18 2000 96/71 - - - - 91 % -   11/18/18 1900 137/86 - - - - - -   11/18/18 1833 132/81 97.8  F (36.6  C) Oral 81 18 (!) 88 % 47.2 kg (104 lb)   11/18/18 1830 132/81 - - - -  - -     Physical Exam  Constitutional:  Appears well-developed and well-nourished. Alert. Conversant. Non toxic.  HENT:   Head: Atraumatic. No depressed skull fracture, Rac[place] Eyes, Blackmon's sign, or hemotympanum. Face normal. TMs normal  Nose: Nose normal.  Mouth/Throat: Oral mucosa is clear and moist. no trismus. Pharynx normal. Tonsils symmetric. No tonsillar enlargement, erythema, or exudate.  Eyes: Conjunctivae normal. EOM normal. Pupils equal, round, and reactive to light. No scleral icterus.   Neck: Normal range of motion. Neck supple. No tracheal deviation present.   Cardiovascular: Normal rate, regular rhythm. No gallop. No friction rub. No murmur heard. Symmetric radial artery pulses   Pulmonary/Chest: Effort normal. No stridor. No respiratory distress. No wheezes. No rales. No rhonchi . No tenderness.   Abdominal: Soft. Bowel sounds normal. No distension. No mass. No tenderness. No rebound. No guarding.   Musculoskeletal:   RUE: Normal range of motion. No tenderness. No deformity  LUE: Normal range of motion. No tenderness. No deformity  Pelvis stable.  Tenderness over left hip.  No obvious deformity, foreshortening, angulation.  No ecchymosis.  She is unable to flex her left hip or left knee due to pain.  RLE: Normal range of motion. No edema. No tenderness. No deformity.  She does have 2 skin tears on her right anterior shin.  1 of them is fairly small about 2 or 3 cm just distal to the knee.  It creates a V-shaped flap of tissue.  No foreign body.  No active bleeding.  There is a larger skin care that is about 8 or 10 cm in a curvilinear C-shaped creating a large flap of superficial epidermis.  No active bleeding.  No foreign body.  No sign of underlying fractures.  LLE: Normal range of motion. No edema. No tenderness of her femoral shaft, thigh, quadriceps, hamstrings.  Knee is normal and nontender.  Leg, shin, ankle, foot are nontender. No deformity she does have a superficial skin tear on the left  proximal shin about a quarter of the way from her knee to her ankle.  Total length of the skin care is about 3 cm.  Creates a V-shaped flap of superficial epidermis.  No active bleeding.  No foreign body.  Lymph: No cervical adenopathy.   Neurological: Alert and oriented to person, place, and time. Normal strength. CN II-VII intact. No sensory deficit. GCS eye subscore is 4. GCS verbal subscore is 5. GCS motor subscore is 6. Normal coordination   Skin: Skin is warm and dry. No rash noted. No pallor. Normal capillary refill.  Psychiatric:  Normal mood. Normal affect.     Emergency Department Course   Imaging:  Radiographic findings were communicated with the patient who voiced understanding of the findings.  XR Pelvis and Hip Left 2 Views  IMPRESSION: Left femoral neck fracture.  As read by Radiology.    Laboratory:  Laboratory findings were communicated to the patient who voiced understanding of the findings.  CBC: WBC 7.5 (HH), PLT 79 (L) o/w WNL (HGB 12.0)  BMP: glucose 105 (H), Carbon Dioxide 33 (H), GFR Estimate 50 (L), Calcium 8.2 (L) o/w WNL (Creatinine 1.04)    Interventions:  1922: LET 3 mL Topical  1923: NS 500mL IV Bolus  1923: Zofran 4 mg IV  1924: Dilaudid 0.5 mg IV    [facility] Procedure Note        Laceration Repair:    Performed by: Sadiq Alexandre  Authorized by: Sadiq Alexandre  Consent given by: Patient who states understanding of the procedure being performed after discussing the risks, benefits and alternatives.    Preparation: Patient was prepped and draped in usual sterile fashion.  Irrigation solution: saline    Body area: 3 separate skin tears  #1.  Left shin- about 3 cm total, creating a V-shaped flap of epidermis  #2.  Right shin- about 8 or 10 cm total, a long C-shaped skin tear creating a large flap of epidermis  #3.  Right shin-about 2-3 cm total creating a V-shaped flap of epidermis    Contamination: The wound is not contaminated.  Foreign bodies:none  Tendon  involvement: none  Anesthesia: Topical with LET  Local anesthetic: LET    Debridement: none  Skin closure: Closed with Wound adhesive and Steri-strips  Technique: Steri Strips and wound adhesinve  Approximation: close  Approximation difficulty: simple    Patient tolerance: Patient tolerated the procedure well with no immediate complications.      Emergency Department Course:  Past medical records, nursing notes, and vitals reviewed.  1853: I performed an exam of the patient and obtained history, as documented above.  1924: IV inserted and blood drawn for laboratory tests, findings above.  Findings and plan explained to the Patient who consents to admission.   2000: The patient was sent for a XR Pelvis and Hip Left  while in the emergency department, findings above.  2005: I spoke with Dr. Gerard regarding this patient.  2043: Rechecked the patient, findings and plan explained to the patient, who consents to admission. Discussed the patient with Dr. Carrasco, who will admit the patient to a inpatient medical bed.    Impression & Plan    Medical Decision Making:  Marie Kline is a 86 year old female who presents to the ER today by EMS for severe left hip pain and inability to bear weight on her left hip after mechanical fall that occurred this evening.    X-rays confirm a nondisplaced subcapital left femoral neck fracture.  Patient has reasonable pain control, almost no pain while she is lying still in bed but is unable to ambulate or bear weight.  She is distally neurovascularly intact.  Discussed with orthopedics who will consult.  Patient will be admitted to the internal medicine service for medical management, pain control.  Discussed with patient and family that likely operative intervention would be indicated for this fracture.  They will discuss further with orthopedics.    Patient also had 3 superficial but fairly large skin tears on her shins.  These were repaired with Steri-Strips and skin adhesive with  "careful attention to help bring the flaps of epidermis\" sock approximation with the adjacent skin.  Patient tolerated the closure well.  No foreign bodies.  No signs of open underlying fractures or any underlying fractures.  Discussed wound care, anticipated healing.  Discussed risk of infection.  At this point no indication for prophylactic antibiotics.    The remainder of her head to toe trauma exam is negative.  No signs of symptoms of head injury, spine injury, thoracic injury, or intra-abdominal injury.    History is clear that this was a mechanical trip and fall and not a syncopal event or seizure.   Screening labs do show marked leukocytosis likely from her known CLL.  No other symptoms of infection.  Electrolytes well balanced.     She will be admitted to the internal medicine service.    Critical Care time:  none    Diagnosis:    ICD-10-CM    1. Fracture of hip, left, closed, initial encounter (H) S72.002A CBC with platelets differential   2. Skin tear of lower leg without complication, unspecified laterality, initial encounter S81.819A        Disposition:  Admitted to an inpatient medical bed  Rico Beltran  11/18/2018   [facility] EMERGENCY DEPARTMENT  IRico, am serving as a scribe at 6:53 PM on 11/18/2018 to document services personally performed by Sadiq Alexandre MD based on my observations and the provider's statements to me.       Sadiq Alexandre MD  11/19/18 1027    "

## 2018-11-19 NOTE — ED NOTES
[facility]  ED Nurse Handoff Report    Marie Klnie is a 86 year old female   ED Chief complaint: Fall and Leg Injury  . ED Diagnosis:   Final diagnoses:   Fracture of hip, left, closed, initial encounter (H)   Skin tear of lower leg without complication, unspecified laterality, initial encounter     Allergies:   Allergies   Allergen Reactions     Diagnostic X-Ray Materials Hives     CT DYE     Contrast Dye Hives     CT DYE     Wound Dressing Adhesive        Code Status: Full Code  Activity level - Baseline/Home:  Independent. Activity Level - Current:   Total Care. Lift room needed: No. Bariatric: No   Needed: No   Isolation: No. Infection: Not Applicable.     Vital Signs:   Vitals:    11/18/18 1830 11/18/18 1833 11/18/18 1900 11/18/18 2000   BP: 132/81 132/81 137/86 96/71   Pulse:  81     Resp:  18     Temp:  97.8  F (36.6  C)     TempSrc:  Oral     SpO2:  (!) 88%  91%   Weight:  47.2 kg (104 lb)         Cardiac Rhythm:  ,      Pain level: 0-10 Pain Scale: 2  Patient confused: No. Patient Falls Risk: Yes.   Elimination Status: Has voided   Patient Report - Initial Complaint: fall. Focused Assessment: Marie Kline is a 86 year old female with extensive history who presents with hip pain after a fall. The patient recounted that she walked into her living room which was dark and accidentally walked into a glass coffee table causing her to trip and land on her hip. The patient was able to get up after the fall but she couldn't put any pressure on her hip. The patient has skin tears on both shins and experiences a lot of pain when she moves her hip. She cannot bend her hip at all.   Tests Performed:   Labs Ordered and Resulted from Time of ED Arrival Up to the Time of Departure from the ED   CBC WITH PLATELETS DIFFERENTIAL - Abnormal; Notable for the following:        Result Value    WBC 73.5 (*)     MCHC 31.4 (*)     RDW 16.9 (*)     Platelet Count 79 (*)     All other components within normal  limits   BASIC METABOLIC PANEL - Abnormal; Notable for the following:     Carbon Dioxide 33 (*)     Glucose 105 (*)     GFR Estimate 50 (*)     Calcium 8.2 (*)     All other components within normal limits     XR Pelvis and Hip Left 2 Views   Final Result   IMPRESSION: Left femoral neck fracture.      TANISHA PRIETO MD          . Abnormal Results: see above.   Treatments provided: see MAR  Family Comments: children present-appropriate interactions  OBS brochure/video discussed/provided to patient:  N/A  ED Medications:   Medications   0.9% sodium chloride BOLUS (500 mLs Intravenous New Bag 11/18/18 1923)     Followed by   sodium chloride 0.9% infusion (not administered)   ondansetron (ZOFRAN) injection 4 mg (4 mg Intravenous Given 11/18/18 1923)   HYDROmorphone (PF) (DILAUDID) injection 0.5 mg (0.5 mg Intravenous Given 11/18/18 1924)   lidocaine/EPINEPHrine/tetracaine (LET) solution SOLN 3 mL (3 mLs Topical Given 11/18/18 1922)     Drips infusing:  No  For the majority of the shift, the patient's behavior Green. Interventions performed were none.     Severe Sepsis OR Septic Shock Diagnosis Present: No    Please note, pt states oxygen saturations are chronically in the mid to high 80s, pt denies dyspnea, is not tachypneic, and has good color at these saturations. ED MD had no concern with pt remaining on room air given her appearance and presentation.       ED Nurse Name/Phone Number: Vicente Schafer,   8:15 PM  RECEIVING UNIT ED HANDOFF REVIEW    Above ED Nurse Handoff Report was reviewed: yes  Reviewed by: Ksenia Maldonado on November 18, 2018 at 9:43 PM

## 2018-11-19 NOTE — H&P
[facility]    Hospitalist History and Physical    Name: Marie Kline    MRN: [id number]  YOB: 1932    Age: 86 year old  Date of Admission:  11/18/2018  Date of Service (when I saw the patient): 11/18/18    Assessment & Plan   Marie Kline is a 86 year old female with a PMH significant for CLL, bladder cancer, stress cardiomyopathy 10/2014 with complete resolution (EF 60-65% echo 3/2017), COPD, hypothyroidism, HLD, depression who presents for evaluation of hip pain after a fall.    1. Left femoral neck fracture secondary to mechanical fall: Patient was going into her living room to make a phone call and while walking in the dark ran into the glass coffee table and tripped over it, scraping her shins and landing on her left hip. She thinks she heard a crack when she fell. She was unable to get up or bear any weight since that injury and activated her Life Alert to be brought to the ER. ER physician contacted orthopedics who plans to place her on the OR schedule for tomorrow.  -Pre-op EKG and type & screen ordered  -Ortho consult  -NPO at midnight  -IVF 75 ml/hr x 10 hours starting at midnight  -Prn analgesics and antiemetics    2. Lower extremity skin tears: Large skin tears over shins bilaterally, bandaged with steri-strips by ER physician. No active bleeding.    3. Right inguinal pain/swelling: In setting of previous excisional biopsy for right inguinal adenopathy performed by Dr. De Paz, done at request of Dr. Ivory to assess if progression or transformation of her disease. Not clear what results showed/indication but patient reports she discussed with Dr. Ivory. ER physician ordered RLQ ultrasound to evaluate the surgical site, though it appears to be healing well; ultrasound pending at time of admission with no acute concerns.   -AM rounder to follow up on results of ultrasound    4. CLL: At least stage III per oncology notes. Diagnosed in 4/2007 and has been receiving IVIG every 4  weeks, last infusion was 10/31. Has chronic high white count. Has persistent right inguinal adenopathy as well as mediastinal and cervical adenopathy. Excisional biopsy of right inguinal adenopathy was completed 10/23 to assess if progression or transformation of her disease. Dr. Ivory has recommended chemotherapy but patient has chosen forgo doing this until after the holiday season, so they are re-visiting this in the next calendar year.    5. Bladder cancer: Having BCG treatment w/ Dr. Nuñez. F/u cysto 5/2016 without evidence of recurrence so receives 3 weekly doses of BCG maintenance therapy. 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 She gets cystos every 6 months for monitoring.    6. History of stress-induced cardiomyopathy: History of stress cardiomyopathy 10/2014 in setting of selling her home (dream home where she lived with her ) with subsequent resolution of wall motion abnormalities and normalization of LV function. Coronary angiogram at that time showed normal coronary arteries. Has followed w/ Dr. Ortega in the past and remains on beta blocker therapy. Echo 3/2017 showed EF of 60-65%.  -Continue PTA metoprolol succinate 25 mg daily  -Hold lasix while on IVF and NPO    7. COPD: Former smoker of 60 pack years, quit in 2009. Worsening of chronic cough with more production than her baseline. Using an albuterol nebulizer, Breo, and Ventolin inhaler routinely. Has not established care with a pulmonologist yet, but did receive a referral from oncology clinic to do so. Not on home O2 at baseline.  -Continue PTA albuterol 2 puffs Q6H prn for wheezing, BID Symbicort.  -Prn duonebs for wheezing/sob    8. Hypothyroidism: Continue levothyroxine 75 mcg daily.    9. HLD: Continue atorvastatin 20 mg daily.    10. Insomnia: Continue PTA trazodone.    DVT Prophylaxis: Pneumatic Compression Devices  Code Status: Full Code  Disposition: Anticipate > 2 evening stay    Primary  Care Physician   Piper Kiser    Chief Complaint   Hip pain after a fall    History is obtained from the patient.    History of Present Illness   Marie Kline is a 86 year old female with a PMH significant for CLL, bladder cancer, stress cardiomyopathy 10/2014 with complete resolution (EF 60-65% echo 3/2017), COPD, hypothyroidism, HLD, depression who presents for evaluation of hip pain after a fall.    The patient reports that she was walking into her living room in the dark to make a phone call and walked into/tripped over a glass coffee table. She scraped her shins on the table during her fall and landed on her left hip, hearing a crack. She did not strike her head, lose consciousness, or sustain any other injuries to her knowledge. She was unable to get up so she crawled to the phone to call her daughter, who told her to activate her Life Alert device. EMS transported her to the ER. She denied recent fever, chills, change in her chronic cough, wheezing, shortness of breath, chest pain, palpitations, nausea, vomiting, abdominal pain, diarrhea, or dysuria.    Upon arrival in the Emergency Department, initial vital signs were stable. ED workup notable for CBC w/ diff showed leukocytosis of 73.5 (chronically elevated WBC count related to CLL), hgb 12, and thrombocytopenia of 72. XR of pelvis and left hip showed femoral neck fracture. The patient received 500 ml IV NS, 0.7 mg IV dilaudid, and 4 mg IV zofran in the Emergency Department.    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    TR 2-3+ per echo         Past Surgical History   Past Surgical History:   Procedure Laterality Date     BIOPSY LYMPH NODE INGUINAL Right 10/23/2018    Procedure: excsional biopsy right inguinal lymph node;  Surgeon: Reji Connors MD;  Location: [facility] OR     BLADDER SURGERY       CORONARY ANGIOGRAPHY ADULT ORDER  10/2014    minimal CAD     CYSTOSCOPY       CYSTOSCOPY, BIOPSY BLADDER, COMBINED N/A 2/9/2016    Procedure: COMBINED CYSTOSCOPY, BIOPSY BLADDER;  Surgeon: Kar Nuñez MD;  Location: [facility] OR     CYSTOSCOPY, TRANSURETHRAL RESECTION (TUR) TUMOR BLADDER, COMBINED N/A 2/9/2016    Procedure: COMBINED CYSTOSCOPY, TRANSURETHRAL RESECTION (TUR) TUMOR BLADDER;  Surgeon: aKr Nuñez MD;  Location: [facility] OR     ESOPHAGOSCOPY, GASTROSCOPY, DUODENOSCOPY (EGD), COMBINED N/A 6/22/2016    Procedure: COMBINED ESOPHAGOSCOPY, GASTROSCOPY, DUODENOSCOPY (EGD);  Surgeon: Freddie Diez MD;  Location:  GI       Prior to Admission Medications   Prior to Admission Medications   Prescriptions Last Dose Informant Patient Reported? Taking?   Multiple Vitamins-Minerals (MULTIVITAMIN GUMMIES ADULT PO)   Yes No   albuterol (PROAIR HFA/PROVENTIL HFA/VENTOLIN HFA) 108 (90 BASE) MCG/ACT Inhaler   No No   Sig: Inhale 2 puffs into the lungs every 6 hours as needed for wheezing   atorvastatin (LIPITOR) 20 MG tablet   No No   Sig: TAKE ONE TABLET BY MOUTH ONE TIME DAILY    budesonide-formoterol (SYMBICORT) 80-4.5 MCG/ACT Inhaler   No No   Sig: Inhale 2 puffs into the lungs 2 times daily   denosumab (PROLIA) 60 MG/ML SOLN injection   No No   Sig: Inject 1 mL (60 mg) Subcutaneous every 6 months   ferrous sulfate (IRON) 325 (65 FE) MG tablet   Yes No   Sig: Take by mouth daily (with breakfast)   furosemide (LASIX) 20 MG tablet   No No   Sig: TAKE ONE TABLET BY MOUTH ONE TIME DAILY     gabapentin (NEURONTIN) 300 MG capsule   No No   Sig: Take 1 capsule (300 mg) by mouth daily as needed   Patient taking differently: Take 300 mg by mouth 2 times daily    levothyroxine (SYNTHROID/LEVOTHROID) 75 MCG tablet   No No   Sig: TAKE ONE TABLET BY MOUTH ONE TIME DAILY    metoprolol succinate (TOPROL-XL) 25 MG 24 hr tablet   No No   Sig: Take 1 tablet (25 mg) by mouth daily   order for DME   No No   Sig: Equipment being ordered: Nebulizer  Fax order to [facility] [phone]   traZODone (DESYREL) 50 MG tablet   No No   Sig: Take 1 tablet (50 mg) by mouth nightly as needed for sleep   Patient taking differently: Take 100 mg by mouth nightly as needed for sleep       Facility-Administered Medications: None     Allergies   Allergies   Allergen Reactions     Diagnostic X-Ray Materials Hives     CT DYE     Contrast Dye Hives     CT DYE     Wound Dressing Adhesive        Social History   Social History   Substance Use Topics     Smoking status: Former Smoker     Types: Cigarettes     Quit date: 2/3/1996     Smokeless tobacco: Never Used     Alcohol use No     Social History     Social History Narrative       Family History   Family history reviewed with patient and is noncontributory.    Review of Systems   A Comprehensive greater than 10 system review of systems was carried out.  Pertinent positives and negatives are noted above.  Otherwise negative for contributory information.    Physical Exam   Temp: 97.8  F (36.6  C) Temp src: Oral BP: 96/71 Pulse: 81   Resp: 18 SpO2: 91 % O2 Device: None (Room air)    Vital Signs with Ranges  Temp:  [97.8  F (36.6  C)] 97.8  F (36.6  C)  Pulse:  [81] 81  Resp:  [18] 18  BP: ()/(71-86) 96/71  SpO2:  [88 %-91 %] 91 %  104 lbs 0 oz    GEN:  Alert, oriented x 3, appears comfortable, no overt distress  HEENT:  Normocephalic/atraumatic, no scleral icterus, no nasal discharge, mouth moist.  CV:  Regular rate and rhythm, no murmur or JVD.  S1 + S2 noted, no S3 or  S4.  LUNGS:  Clear to auscultation bilaterally without rales/rhonchi/wheezing/retractions.  Symmetric chest rise on inhalation noted.  ABD:  Active bowel sounds, soft, non-tender/non-distended.  No rebound/guarding/rigidity.  EXT:  Tenderness to palpation over left hip. No edema.  No cyanosis.  No acute joint synovitis noted.  SKIN:  Dry to touch, no exanthems noted in the visualized areas.  NEURO:  Symmetric muscle strength, sensation to touch grossly intact.  Coordination symmetric on general exam.  No new focal deficits appreciated.    Data   Data reviewed today:      Results for orders placed or performed during the hospital encounter of 11/18/18 (from the past 48 hour(s))   CBC with platelets differential   Result Value Ref Range    WBC 73.5 (HH) 4.0 - 11.0 10e9/L    RBC Count 3.97 3.8 - 5.2 10e12/L    Hemoglobin 12.0 11.7 - 15.7 g/dL    Hematocrit 38.2 35.0 - 47.0 %    MCV 96 78 - 100 fl    MCH 30.2 26.5 - 33.0 pg    MCHC 31.4 (L) 31.5 - 36.5 g/dL    RDW 16.9 (H) 10.0 - 15.0 %    Platelet Count 79 (L) 150 - 450 10e9/L    Diff Method Manual Differential     % Neutrophils 5.0 %    % Lymphocytes 87.0 %    % Monocytes 8.0 %    % Eosinophils 0.0 %    % Basophils 0.0 %    Absolute Neutrophil 3.7 1.6 - 8.3 10e9/L    Absolute Lymphocytes 63.9 (H) 0.8 - 5.3 10e9/L    Absolute Monocytes 5.9 (H) 0.0 - 1.3 10e9/L    Absolute Eosinophils 0.0 0.0 - 0.7 10e9/L    Absolute Basophils 0.0 0.0 - 0.2 10e9/L    Anisocytosis Slight     Elliptocytes Slight     Platelet Estimate       Automated count confirmed.  Platelet morphology is normal.   Basic metabolic panel   Result Value Ref Range    Sodium 140 133 - 144 mmol/L    Potassium 4.3 3.4 - 5.3 mmol/L    Chloride 103 94 - 109 mmol/L    Carbon Dioxide 33 (H) 20 - 32 mmol/L    Anion Gap 4 3 - 14 mmol/L    Glucose 105 (H) 70 - 99 mg/dL    Urea Nitrogen 11 7 - 30 mg/dL    Creatinine 1.04 0.52 - 1.04 mg/dL    GFR Estimate 50 (L) >60 mL/min/1.7m2    GFR Estimate If Black 61 >60  mL/min/1.7m2    Calcium 8.2 (L) 8.5 - 10.1 mg/dL   XR Pelvis and Hip Left 2 Views    Narrative    PELVIS WITH UNILATERAL HIP TWO VIEWS LEFT  11/18/2018 7:41 PM     HISTORY: Fall, left hip pain.     COMPARISON: None.      Impression    IMPRESSION: Left femoral neck fracture.    MD Joselin FRASER PA-C

## 2018-11-20 ENCOUNTER — APPOINTMENT (OUTPATIENT)
Dept: PHYSICAL THERAPY | Facility: CLINIC | Age: 83
DRG: 470 | End: 2018-11-20
Attending: ORTHOPAEDIC SURGERY
Payer: MEDICARE

## 2018-11-20 LAB
ANION GAP SERPL CALCULATED.3IONS-SCNC: <1 MMOL/L (ref 3–14)
BUN SERPL-MCNC: 11 MG/DL (ref 7–30)
CALCIUM SERPL-MCNC: 7.2 MG/DL (ref 8.5–10.1)
CHLORIDE SERPL-SCNC: 105 MMOL/L (ref 94–109)
CO2 SERPL-SCNC: 33 MMOL/L (ref 20–32)
CREAT SERPL-MCNC: 1.14 MG/DL (ref 0.52–1.04)
ERYTHROCYTE [DISTWIDTH] IN BLOOD BY AUTOMATED COUNT: 17.2 % (ref 10–15)
GFR SERPL CREATININE-BSD FRML MDRD: 45 ML/MIN/1.7M2
GLUCOSE SERPL-MCNC: 102 MG/DL (ref 70–99)
HCT VFR BLD AUTO: 27.9 % (ref 35–47)
HGB BLD-MCNC: 8.3 G/DL (ref 11.7–15.7)
MCH RBC QN AUTO: 29.2 PG (ref 26.5–33)
MCHC RBC AUTO-ENTMCNC: 29.7 G/DL (ref 31.5–36.5)
MCV RBC AUTO: 98 FL (ref 78–100)
PLATELET # BLD AUTO: 66 10E9/L (ref 150–450)
POTASSIUM SERPL-SCNC: 5 MMOL/L (ref 3.4–5.3)
RBC # BLD AUTO: 2.84 10E12/L (ref 3.8–5.2)
SODIUM SERPL-SCNC: 138 MMOL/L (ref 133–144)
WBC # BLD AUTO: 52.9 10E9/L (ref 4–11)

## 2018-11-20 PROCEDURE — A9270 NON-COVERED ITEM OR SERVICE: HCPCS | Performed by: HOSPITALIST

## 2018-11-20 PROCEDURE — 12000000 ZZH R&B MED SURG/OB

## 2018-11-20 PROCEDURE — 99207 ZZC CDG-MDM COMPONENT: MEETS LOW - DOWN CODED: CPT | Performed by: HOSPITALIST

## 2018-11-20 PROCEDURE — 25000128 H RX IP 250 OP 636: Performed by: ORTHOPAEDIC SURGERY

## 2018-11-20 PROCEDURE — 25000132 ZZH RX MED GY IP 250 OP 250 PS 637: Performed by: ORTHOPAEDIC SURGERY

## 2018-11-20 PROCEDURE — 85027 COMPLETE CBC AUTOMATED: CPT | Performed by: HOSPITALIST

## 2018-11-20 PROCEDURE — 97530 THERAPEUTIC ACTIVITIES: CPT | Mod: GP | Performed by: PHYSICAL THERAPIST

## 2018-11-20 PROCEDURE — 40000193 ZZH STATISTIC PT WARD VISIT: Performed by: PHYSICAL THERAPIST

## 2018-11-20 PROCEDURE — 97161 PT EVAL LOW COMPLEX 20 MIN: CPT | Mod: GP | Performed by: PHYSICAL THERAPIST

## 2018-11-20 PROCEDURE — 80048 BASIC METABOLIC PNL TOTAL CA: CPT | Performed by: HOSPITALIST

## 2018-11-20 PROCEDURE — 25000132 ZZH RX MED GY IP 250 OP 250 PS 637: Performed by: HOSPITALIST

## 2018-11-20 PROCEDURE — 94640 AIRWAY INHALATION TREATMENT: CPT

## 2018-11-20 PROCEDURE — 99232 SBSQ HOSP IP/OBS MODERATE 35: CPT | Performed by: HOSPITALIST

## 2018-11-20 PROCEDURE — 97110 THERAPEUTIC EXERCISES: CPT | Mod: GP | Performed by: PHYSICAL THERAPIST

## 2018-11-20 PROCEDURE — A9270 NON-COVERED ITEM OR SERVICE: HCPCS | Performed by: ORTHOPAEDIC SURGERY

## 2018-11-20 PROCEDURE — 40000275 ZZH STATISTIC RCP TIME EA 10 MIN

## 2018-11-20 PROCEDURE — 97116 GAIT TRAINING THERAPY: CPT | Mod: GP | Performed by: PHYSICAL THERAPIST

## 2018-11-20 PROCEDURE — A9270 NON-COVERED ITEM OR SERVICE: HCPCS | Performed by: STUDENT IN AN ORGANIZED HEALTH CARE EDUCATION/TRAINING PROGRAM

## 2018-11-20 PROCEDURE — 85018 HEMOGLOBIN: CPT | Performed by: HOSPITALIST

## 2018-11-20 PROCEDURE — 36415 COLL VENOUS BLD VENIPUNCTURE: CPT | Performed by: HOSPITALIST

## 2018-11-20 PROCEDURE — 25000132 ZZH RX MED GY IP 250 OP 250 PS 637: Performed by: STUDENT IN AN ORGANIZED HEALTH CARE EDUCATION/TRAINING PROGRAM

## 2018-11-20 RX ORDER — ACETAMINOPHEN 325 MG/1
650 TABLET ORAL EVERY 4 HOURS PRN
Qty: 40 TABLET | Refills: 0 | Status: SHIPPED | OUTPATIENT
Start: 2018-11-22 | End: 2018-11-29

## 2018-11-20 RX ORDER — OXYCODONE HYDROCHLORIDE 5 MG/1
5 TABLET ORAL EVERY 4 HOURS PRN
Qty: 30 TABLET | Refills: 0 | Status: SHIPPED | OUTPATIENT
Start: 2018-11-20 | End: 2018-11-21

## 2018-11-20 RX ORDER — ASPIRIN 325 MG
325 TABLET, DELAYED RELEASE (ENTERIC COATED) ORAL DAILY
Qty: 45 TABLET | Refills: 0 | Status: SHIPPED | OUTPATIENT
Start: 2018-11-20 | End: 2019-07-15

## 2018-11-20 RX ORDER — GABAPENTIN 300 MG/1
300 CAPSULE ORAL 2 TIMES DAILY
Status: COMPLETED | OUTPATIENT
Start: 2018-11-20 | End: 2018-11-20

## 2018-11-20 RX ORDER — ONDANSETRON 4 MG/1
4 TABLET, ORALLY DISINTEGRATING ORAL EVERY 6 HOURS PRN
Qty: 20 TABLET | Refills: 0 | Status: ON HOLD | OUTPATIENT
Start: 2018-11-20 | End: 2019-06-19

## 2018-11-20 RX ORDER — AMOXICILLIN 250 MG
2 CAPSULE ORAL 2 TIMES DAILY
Qty: 40 TABLET | Refills: 0 | Status: SHIPPED | OUTPATIENT
Start: 2018-11-20 | End: 2018-12-17

## 2018-11-20 RX ORDER — TRAZODONE HYDROCHLORIDE 100 MG/1
100 TABLET ORAL AT BEDTIME
Status: COMPLETED | OUTPATIENT
Start: 2018-11-20 | End: 2018-11-20

## 2018-11-20 RX ADMIN — FLUTICASONE FUROATE AND VILANTEROL TRIFENATATE 1 PUFF: 100; 25 POWDER RESPIRATORY (INHALATION) at 07:40

## 2018-11-20 RX ADMIN — GABAPENTIN 300 MG: 300 CAPSULE ORAL at 22:33

## 2018-11-20 RX ADMIN — TRAZODONE HYDROCHLORIDE 100 MG: 100 TABLET ORAL at 22:33

## 2018-11-20 RX ADMIN — ATORVASTATIN CALCIUM 20 MG: 20 TABLET, FILM COATED ORAL at 08:01

## 2018-11-20 RX ADMIN — ENOXAPARIN SODIUM 30 MG: 30 INJECTION SUBCUTANEOUS at 05:44

## 2018-11-20 RX ADMIN — SENNOSIDES AND DOCUSATE SODIUM 1 TABLET: 8.6; 5 TABLET ORAL at 08:02

## 2018-11-20 RX ADMIN — LEVOTHYROXINE SODIUM 75 MCG: 75 TABLET ORAL at 08:02

## 2018-11-20 RX ADMIN — CELECOXIB 100 MG: 100 CAPSULE ORAL at 08:01

## 2018-11-20 RX ADMIN — OXYCODONE HYDROCHLORIDE 5 MG: 5 TABLET ORAL at 00:15

## 2018-11-20 RX ADMIN — OXYCODONE HYDROCHLORIDE 5 MG: 5 TABLET ORAL at 08:01

## 2018-11-20 RX ADMIN — ACETAMINOPHEN 975 MG: 325 TABLET, FILM COATED ORAL at 08:01

## 2018-11-20 RX ADMIN — SODIUM CHLORIDE: 9 INJECTION, SOLUTION INTRAVENOUS at 02:05

## 2018-11-20 RX ADMIN — RANITIDINE 150 MG: 150 TABLET ORAL at 21:23

## 2018-11-20 RX ADMIN — SENNOSIDES AND DOCUSATE SODIUM 1 TABLET: 8.6; 5 TABLET ORAL at 21:23

## 2018-11-20 RX ADMIN — RANITIDINE 150 MG: 150 TABLET ORAL at 08:02

## 2018-11-20 ASSESSMENT — ACTIVITIES OF DAILY LIVING (ADL)
ADLS_ACUITY_SCORE: 13
ADLS_ACUITY_SCORE: 11
ADLS_ACUITY_SCORE: 13
ADLS_ACUITY_SCORE: 11
ADLS_ACUITY_SCORE: 11
ADLS_ACUITY_SCORE: 13

## 2018-11-20 NOTE — PLAN OF CARE
Problem: Patient Care Overview  Goal: Plan of Care/Patient Progress Review  Outcome: No Change  Patient alert and oriented  Peripheral IV infusing continuous fluids  Tolerating full liquid diet, no nausea or vomiting  Dressing CDI  CMS intact  BPs soft, other VSS on 3L of O2   Denies pain at this time  Hip abductor pillow in place  Plan for therapy and IV ancef  Continue with plan of care

## 2018-11-20 NOTE — PLAN OF CARE
Problem: Patient Care Overview  Goal: Plan of Care/Patient Progress Review  Outcome: No Change  VSS, 98% O2 on 3L.   Abduction pillow in place.   Full liquid diet.  Rees in place.   Oxy for pain.

## 2018-11-20 NOTE — PROGRESS NOTES
11/20/18 0947   Quick Adds   Type of Visit Initial PT Evaluation   Living Environment   Lives With alone   Living Arrangements independent living facility  ([facility])   Home Accessibility no concerns   Living Environment Comment Pt reports has a chidi Morales namedJohnathan Hsieh   Self-Care   Regular Exercise yes   Activity/Exercise Type walking   Equipment Currently Used at Home walker, rolling;wheelchair, manual;raised toilet;shower chair;cane, straight   Functional Level Prior   Ambulation 0-->independent   Transferring 0-->independent   Toileting 0-->independent   Bathing 0-->independent   Dressing 0-->independent   Eating 0-->independent   Communication 0-->understands/communicates without difficulty   Swallowing 0-->swallows foods/liquids without difficulty   Cognition 0 - no cognition issues reported   Fall history within last six months yes   Number of times patient has fallen within last six months 1   General Information   Onset of Illness/Injury or Date of Surgery - Date 11/19/18   Referring Physician Dr. Tillman   Patient/Family Goals Statement Pt no stated goals   Pertinent History of Current Problem (include personal factors and/or comorbidities that impact the POC) Pt is status post fall, femoral neck fracture. Pt is now status post L STACEY. Pt has medical history of COPD, CLL, MI.    Weight-Bearing Status - LLE weight-bearing as tolerated   Cognitive Status Examination   Orientation orientation to person, place and time   Level of Consciousness alert   Follows Commands and Answers Questions 100% of the time;able to follow multistep instructions   Personal Safety and Judgment intact   Memory intact   Pain Assessment   Patient Currently in Pain Yes, see Vital Sign flowsheet   Integumentary/Edema   Integumentary/Edema Comments as expected in L hip   Posture    Posture Forward head position;Protracted shoulders   Range of Motion (ROM)   ROM Comment tolerating 90 degrees of ROM in hip   Strength   Strength  "Comments able to perform inde SAQ, decreased functional mob   Bed Mobility   Bed Mobility Comments min A   Transfer Skills   Transfer Comments min A   Gait   Gait Comments min A   Balance   Balance Comments 1 LOB with turning, FWW   Modality Interventions   Planned Modality Interventions Cryotherapy   General Therapy Interventions   Planned Therapy Interventions balance training;bed mobility training;gait training;strengthening;transfer training;risk factor education;home program guidelines;progressive activity/exercise   Clinical Impression   Criteria for Skilled Therapeutic Intervention yes, treatment indicated   PT Diagnosis decreased functional mobility status post L STACEY due to fall and femoral neck fx   Influenced by the following impairments pain, decreased ROM, decreased strength   Functional limitations due to impairments decreased ambulation, transfers, bed mob   Clinical Presentation Stable/Uncomplicated   Clinical Presentation Rationale improving status post STACEY    Clinical Decision Making (Complexity) Low complexity   Therapy Frequency` daily   Predicted Duration of Therapy Intervention (days/wks) 3 days   Anticipated Discharge Disposition Transitional Care Facility   Risk & Benefits of therapy have been explained Yes   Patient, Family & other staff in agreement with plan of care Yes   [facility] Social TM \"6 Clicks\"   2016, Trustees of [facility], under license to [employer].  All rights reserved.   6 Clicks Short Forms Basic Mobility Inpatient Short Form   [facility] [employer]  \"6 Clicks\" V.2 Basic Mobility Inpatient Short Form   1. Turning from your back to your side while in a flat bed without using bedrails? 3 - A Little   2. Moving from lying on your back to sitting on the side of a flat bed without using bedrails? 3 - A Little   3. Moving to and from a bed to a chair (including a wheelchair)? 3 - A Little   4. Standing up from a chair using your arms (e.g., wheelchair, or " bedside chair)? 3 - A Little   5. To walk in hospital room? 3 - A Little   6. Climbing 3-5 steps with a railing? 3 - A Little   Basic Mobility Raw Score (Score out of 24.Lower scores equate to lower levels of function) 18   Total Evaluation Time   Total Evaluation Time (Minutes) 10

## 2018-11-20 NOTE — PROGRESS NOTES
Orthopedic Surgery  11/20/2018    S: Patient voices no complaints today.     O: Blood pressure 93/52, pulse 64, temperature 96.2  F (35.7  C), temperature source Axillary, resp. rate 13, weight 47.2 kg (104 lb), SpO2 98 %, not currently breastfeeding.  Lab Results   Component Value Date    HGB 8.3 11/20/2018     Lab Results   Component Value Date    INR 1.00 08/24/2018        Neurovascularly intact.  Calves are negative bilaterally, both soft and nontender.  The wound is C/D/I.  The wound looks good with minimal erythema of the surrounding skin.    A: Ms. Kline is doing well status post Procedure(s):  OPEN REDUCTION INTERNAL FIXATION HIP BIPOLAR.    P: Continue physical therapy.   Anticoagulation   Pain management  Discharge planning    Scar Najera  [phone]

## 2018-11-20 NOTE — PROGRESS NOTES
"SPIRITUAL HEALTH SERVICES  SPIRITUAL ASSESSMENT Progress Note  [facility]  Ortho 6    PRIMARY FOCUS:     Goals of care    Symptom/pain management    Emotional/spiritual/Hindu distress    Support for coping    ILLNESS CIRCUMSTANCES:   Reviewed documentation. Reflective conversation shared with Marie which integrated elements of illness and family narratives.     Context of Serious Illness/Symptom(s) - Marie is admitted after having fallen in her home. She underwent a \"partial hip replacement\" yesterday. She we proud to note that she has been up walking with the PT today.    Resources for Support - Marie is supported by her daughter, Margaret, who is at the bedside and Wenceslao echols \"mukul\". She has two sons living in [place] and [place] and one child  at age of 12yrs.    DISTRESS:     Emotional/Existential/Relational Distress - Both Marie and Margaret expressed disappointment at having to cancel travel plans to [place] to visit Wenceslao echols's family for Thanksgiving.    Spiritual/Mormonism Distress - Not Discussed    Social/Cultural/Economic Distress - Not Discussed    SPIRITUAL/Samaritan COPING:     Yazidi/Caty - Marie is active in her caty community at [place], where she lives.  She mentioned her fondness for the \"young preacher\" and his dedication.    Spiritual Practice(s) - Attends weekly prayer services at [facility].  Marie stated she prays to God \"every day first thing.  For a good day.\"    Emotional/Existential/Relational Connections - Marie is very close to [facility] and [facility] and talked about her love of her dog, Héctoro.    GOALS OF CARE:    Goals of Care - discharge to TCU.  Make the best of Thanksgiving...    Meaning/Sense-Making - Connected to family, caty and community of friends at her residence.    PLAN: [facility] remains available for any further needs or requests.          Jean Murphy MA  Staff   Pager: [phone]  Phone: [phone]      "

## 2018-11-20 NOTE — PROGRESS NOTES
SWS    D:  JOSIAS assisting with discharge planning per request of [facility]  Angelica Troy ( [phone]). Angelica today has informed SW that per her conversation planning at this point is for pt's transfer to rehab facility on discharge, [facility] and [facility] are identified as facilities for consideration, semi-private room requested, referrals made. Per Angelica pt will have consideration for her daughter to transport her to the rehab facility, pending progress.     A/P: Will await assessments and determination of facility availability, continue planning per MD, and [facility] .       Addendum:     D: [facility] has called informing that they do not have a bed available, JOSIAS has notified [facility]  Angelica Troy.     Addendum:      D: [facility] has assessed and will be able to accept pt. Further discharge planning per MD determination of discharge date, direction from [facility]  in anticipation of pt's discharge to [facility].

## 2018-11-20 NOTE — PLAN OF CARE
Problem: Patient Care Overview  Goal: Plan of Care/Patient Progress Review  Discharge Planner OT   Patient plan for discharge: Per [facility] care plan, TCU then home with home care  Current status: Order received, chart reviewed. Per [facility] care plan, pt to discharge to TCU prior to returning home. Pt will benefit from OT eval, most appropriate to initiate in TCU setting. Will defer OT eval to TCU. IP OT order completed.   Barriers to return to prior living situation: See PT note for barriers  Recommendations for discharge: TCU per [facility] care plan, defer OT eval to TCU  Rationale for recommendations: Recommend OT eval at TCU as pt with planned discharge to TCU prior to return home per [facility].        Entered by: Liza Velasco 11/20/2018 2:26 PM

## 2018-11-20 NOTE — PROGRESS NOTES
[facility]    Hospitalist Progress Note  Name: Marie Kline    MRN: [id number]  Provider:  Wenceslao Craig DO MPH  Date of Service: 11/20/2018    Summary of Stay: Marie Kline is a 86 year old female with a PMH significant for CLL, bladder cancer, stress cardiomyopathy 10/2014 with complete resolution (EF 60-65% echo 3/2017), COPD, hypothyroidism, HLD, depression who presented for evaluation of hip pain after a fall.  Underwent operative repair this afternoon with reported 100 cc of blood loss.  Was hypotensive throughout the procedure which has continued since reaching the floor.  She currently feels well and has no complaints.     1. Left femoral neck fracture secondary to mechanical fall: Patient was going into her living room to make a phone call and while walking in the dark ran into the glass coffee table and tripped over it, scraping her shins and landing on her left hip. She thinks she heard a crack when she fell. She was unable to get up or bear any weight since that injury and activated her Life Alert to be brought to the ER.  Underwent operative repair 11/19, she was hypotensive the evening following surgery but this is now improved and she is doing remarkably well.  -Appreciate orthopedic evaluation  -PRN analgesics and antiemetics, minimize narcotics due to hypotension  -Enoxaparin for DVT prophylaxis per orthopedics  -PT and OT, likely needs TCU  -Stop IVF     2. Lower extremity skin tears: Large skin tears over shins bilaterally, bandaged with steri-strips by ER physician. No active bleeding.     3. Right inguinal pain/swelling: In setting of previous excisional biopsy for right inguinal adenopathy performed by Dr. De Paz, done at request of Dr. Ivory to assess if progression or transformation of her disease. Not clear what results showed/indication but patient reports she discussed with Dr. Ivory.      4. CLL: At least stage III per oncology notes. Diagnosed in 4/2007 and has been  receiving IVIG every 4 weeks, last infusion was 10/31. Has chronic high white count. Has persistent right inguinal adenopathy as well as mediastinal and cervical adenopathy. Excisional biopsy of right inguinal adenopathy was completed 10/23 to assess if progression or transformation of her disease. Dr. Ivory has recommended chemotherapy but patient has chosen forgo doing this until after the holiday season, so they are re-visiting this in the next calendar year.     5. Bladder cancer: Having BCG treatment w/ Dr. Nuñez. F/u cysto 5/2016 without evidence of recurrence so receives 3 weekly doses of BCG maintenance therapy. 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 She gets cystos every 6 months for monitoring.     6. History of stress-induced cardiomyopathy: History of stress cardiomyopathy 10/2014 in setting of selling her home (dream home where she lived with her ) with subsequent resolution of wall motion abnormalities and normalization of LV function. Coronary angiogram at that time showed normal coronary arteries. Has followed w/ Dr. Ortega in the past and remains on beta blocker therapy. Echo 3/2017 showed EF of 60-65%.  -Hold PTA Toprol given hypotension  -Hold lasix   -Stop IVF     7. COPD: Former smoker of 60 pack years, quit in 2009. Worsening of chronic cough with more production than her baseline. Using an albuterol nebulizer, Breo, and Ventolin inhaler routinely. Has not established care with a pulmonologist yet, but did receive a referral from oncology clinic to do so. Not on home O2 at baseline.  -Continue PTA albuterol 2 puffs Q6H prn for wheezing, BID Symbicort.  -Prn duonebs for wheezing     8. Hypothyroidism: Continue levothyroxine 75 mcg daily.     9. HLD: Continue atorvastatin 20 mg daily.     10. Insomnia: Continue PTA trazodone.    11.  Acute blood loss anemia: Decrease in her hemoglobin as expected from surgery and hemodilution.  No indication for  transfusion at this point.    DVT Prophylaxis: Enoxaparin (Lovenox) SQ  Code Status: Full Code  Disposition: Expected discharge in 1-2 days to TCU. Goals prior to discharge include rehab.   Incidental Findings: None.  Family updated today: No.     Interval History   The patient reports doing well. No chest pain or shortness of breath. No nausea, vomiting, diarrhea, constipation. No fevers. No other specific complaints identified.     -Data reviewed today: I personally reviewed all new labs and imaging results over the last 24 hours.     Physical Exam   Temp: 97  F (36.1  C) Temp src: Oral BP: 93/44 Pulse: 64 Heart Rate: 74 Resp: 18 SpO2: 96 % O2 Device: Nasal cannula Oxygen Delivery: 1 LPM  Vitals:    11/18/18 1833   Weight: 47.2 kg (104 lb)     Vital Signs with Ranges  Temp:  [95.6  F (35.3  C)-99.5  F (37.5  C)] 97  F (36.1  C)  Pulse:  [64-71] 64  Heart Rate:  [59-82] 74  Resp:  [5-21] 18  BP: ()/(32-68) 93/44  SpO2:  [92 %-100 %] 96 %  I/O last 3 completed shifts:  In: 4606 [P.O.:810; I.V.:3796]  Out: 800 [Urine:700; Blood:100]    GENERAL: No apparent distress. Awake, alert, and fully oriented.  HEENT: Normocephalic, atraumatic. Extraocular movements intact.  CARDIOVASCULAR: Regular rate and rhythm without murmurs or rubs. No S3.  PULMONARY: Clear bilaterally.  GASTROINTESTINAL: Soft, non-tender, non-distended. Bowel sounds normoactive.   EXTREMITIES: No cyanosis or clubbing. No edema.  NEUROLOGICAL: CN 2-12 grossly intact, no focal neurological deficits.  DERMATOLOGICAL: No rash, ulcer, bruising, nor jaundice.     Medications       acetaminophen  975 mg Oral Q8H     atorvastatin  20 mg Oral Daily     enoxaparin  30 mg Subcutaneous Q24H     fluticasone-vilanterol  1 puff Inhalation Daily     levothyroxine  75 mcg Oral Daily     ranitidine  150 mg Oral BID     senna-docusate  1 tablet Oral BID    Or     senna-docusate  2 tablet Oral BID     sodium chloride (PF)  3 mL Intracatheter Q8H     Data      Laboratory:    Recent Labs  Lab 11/20/18  0629 11/19/18  1548 11/19/18  0731 11/18/18  1924   WBC 52.9*  --  69.2* 73.5*   HGB 8.3* 9.9* 11.3* 12.0   HCT 27.9*  --  36.9 38.2   MCV 98  --  98 96   PLT 66* 67* 68* 79*       Recent Labs  Lab 11/20/18  0629 11/19/18  1548 11/19/18  0731    137 138   POTASSIUM 5.0 5.0 4.8   CHLORIDE 105 103 103   CO2 33* 33* 35*   ANIONGAP <1* 1* <1*   * 150* 104*   BUN 11 12 10   CR 1.14* 1.10* 1.14*   GFRESTIMATED 45* 47* 45*   GFRESTBLACK 55* 57* 55*   CARLOS 7.2* 7.5* 8.2*     No results for input(s): CULT in the last 168 hours.    Imaging:  Recent Results (from the past 24 hour(s))   XR Pelvis w Hip Port Left 1 View    Narrative    PELVIS AND HIP PORTABLE LEFT ONE VIEW   11/19/2018 11:52 AM     HISTORY: Postoperative.     COMPARISON: 11/18/2018      Impression    IMPRESSION: Interval left total hip replacement. No fracture or  dislocation. Right hip unremarkable.    MD Wenceslao WAGNER DO MPH  [facility] Hospitalist  [address] [place] [place] [address]  Pager: [phone]  11/20/2018

## 2018-11-20 NOTE — PLAN OF CARE
Problem: Patient Care Overview  Goal: Plan of Care/Patient Progress Review  Outcome: No Change  Arrived from PACU 1500, s/p ORIF left hip.  A&Ox4,  BP's soft 80/40's, Dr. Craig aware, labs drawn.  Hgb 9.9, recheck in am.  Patient denies dizziness/lightheadedness at rest.  Capnography on, no alarms, IPI 7-10, ETCO 22-38.  O2 weaned to 3L/NC, sats 91-94%. LS clear/diminished.  Instructed on incentive spirometer.  Rees patent.  LLE CMS intact, pedal pulses +1, skin cool, denies numbness/tingling.  Abduction pillow in place.  BLE Skin tears cleansed and redressed with adaptic and gauze/kerliz wrap.  Tolerated clears, advanced to full liquid diet.  Denies nausea.  Hypo bowel sounds, negative flatus.  Plan to dangle this evening after supper.  PT tomorrow.  Continue to monitor, continue with plan of care.

## 2018-11-20 NOTE — PLAN OF CARE
Problem: Patient Care Overview  Goal: Plan of Care/Patient Progress Review  Outcome: Improving  RN  A/O.  VSS, afebrile (soft BPs).  Unable to wean off oxygen, maintaining saturations on 2L NC.  Pain managed with oxycodone, tylenol and ice.  CMS intact.  Aquacel CDI.  Multiple skin tears on BLE, and new one on LUE, all dressings CDI.  Up with A1 and WW pivoting to BSC and chair.  Tolerating regular diet.  Pt is DTV, bladder scanned for 25 ml, encouraged to drink more fluids.  Plan is for TCU on discharge, SW following.  Will continue to monitor.

## 2018-11-20 NOTE — OP NOTE
Procedure Date: 11/18/2018      PREOPERATIVE DIAGNOSIS:  Left femoral neck fracture.      POSTOPERATIVE DIAGNOSIS:  Left femoral neck fracture.      PROCEDURE:  Left bipolar hemiarthroplasty.      SURGEON:  Scar Reynolds MD      ASSISTANT:  Melony Thompson PA-C        PROCEDURE:  Left bipolar hemiarthroplasty using a Smith and Nephew Synergy femoral stem.      INDICATIONS:  The patient is a very pleasant 86-year-old female who had a left femoral neck fracture following a fall last night at home.  She was unable to ambulate following this, was taken to [facility], found to have a femoral neck fracture, admitted for definitive care.  I had a long discussion with her daughter regarding treatment options.  Given her age, activity level, nature of this injury, I think she would benefit from a bipolar hemiarthroplasty.  She understands the risks, benefits, alternatives and wishes to proceed with surgery.      NARRATIVE EVENTS:  After thorough preoperative evaluation and proper identification of the patient's extremity to be operated on, Ms. Kline was taken to the operating room where she was given a general anesthetic and placed in the left lateral decubitus position.  The left hip was prepped and draped in the usual sterile manner.  After appropriate surgical pause to confirm the patient's extremity to be operated on, and 10 minutes after the patient received 2 grams of Ancef, her left hip was approached through a lateral incision centered over the greater trochanter.  Skin and soft tissues were sharply dissected down to the tensor fascia.  The fascia was then split in line with the fibers in line with the femur, taking down the trochanteric bursa.  Bursal tissue removed.  The anterior one-third gluteus medius removed off the greater trochanter in a modified Hardinge fashion.  This took us down onto the joint capsule which was T'd.  The femoral neck fracture was easily identified.  We made a femoral neck  osteotomy 12 mm proximal to the lesser trochanter just distal to the femoral neck fracture.  We then removed the femoral head from the field.  Exposing the acetabula sized the acetabulum.  It sized to fit a size 44 bipolar a Smith and Nephew bipolar component.  We cleared the acetabulum along hematogenous fibrinous debris and then we paid our attention to the femur.  Here we removed the bulk with a 45 using Ansler reamers, reamed and then broached up to a size 12 Smith and Nephew Synergy femoral stem with a standard neck offset and a -3 neck length on our femoral head.  We got good stability, full range of motion, appropriate soft tissue tension.  So, at this point, we prepared to place our final implants using second generation cement techniques using two batches of cement, one with antibiotics.  We cemented in a size 12 Smith & Nephew Synergy femoral stem with a 10 mm distal centralizer.  Once this was solidly into position, the cement had cured, retrialed the femoral head and found that a -3 neck length and a 28 mm femoral head gave us good stability and full range of motion.  This was impacted on the trunnion with a 44 bipolar component.  The hip was thoroughly irrigated and then reduced.  The joint capsule was closed with interrupted 0 Vicryl sutures.  We closed the abductors with #5 Ethibond sutures through bone tunnels.  We closed the tensor fascia with interrupted and running 0 Vicryl sutures, and then closed skin and soft tissue with absorbable sutures and staples in the skin.  The patient was placed in a well-padded postoperative dressing and taken to the recovery room in stable condition.  She tolerated the procedure without difficulty.         AAYUSH ELIZABETH MD             D: 2018   T: 2018   MT: PABLITO      Name:     MAGGI RODRIGUEZ   MRN:      [id number]        Account:        [id number]   :      1932           Procedure Date: 2018      Document: [id number]

## 2018-11-20 NOTE — PLAN OF CARE
Problem: Patient Care Overview  Goal: Plan of Care/Patient Progress Review  PT: PT eval completed. Pt is status post L STACEY due to L femur fracture. Pt lives alone in independent living facility.   Discharge Planner PT   Patient plan for discharge: Per BPCI care plan TCU then home with home care  Current status: Pt educated on WBAT, PT role and POC. Pt was cued for supine to sit. Pt needing min A for performance. Pt was educated on how to perform sit<>Stand with hand placement and technique with FWW, min A.  Pt was educated on use of walker with ambulation, cues for step progression. Pt was educated on step through, posture. Min A. 15 feet. BP low, RN informed but no symptoms with this.   Barriers to return to prior living situation: needing A with bed mob, activity  Recommendations for discharge: TCU   Rationale for recommendations: Pt is currently functioning below baseline, will benefit from ongoing skilled PT intervention to improve safety and tolerance with mobility skills.

## 2018-11-21 ENCOUNTER — APPOINTMENT (OUTPATIENT)
Dept: PHYSICAL THERAPY | Facility: CLINIC | Age: 83
DRG: 470 | End: 2018-11-21
Payer: MEDICARE

## 2018-11-21 LAB
ANION GAP SERPL CALCULATED.3IONS-SCNC: <1 MMOL/L (ref 3–14)
BUN SERPL-MCNC: 7 MG/DL (ref 7–30)
CALCIUM SERPL-MCNC: 7.4 MG/DL (ref 8.5–10.1)
CHLORIDE SERPL-SCNC: 107 MMOL/L (ref 94–109)
CO2 SERPL-SCNC: 34 MMOL/L (ref 20–32)
CREAT SERPL-MCNC: 0.98 MG/DL (ref 0.52–1.04)
ERYTHROCYTE [DISTWIDTH] IN BLOOD BY AUTOMATED COUNT: 17.2 % (ref 10–15)
GFR SERPL CREATININE-BSD FRML MDRD: 54 ML/MIN/1.7M2
GLUCOSE SERPL-MCNC: 87 MG/DL (ref 70–99)
HCT VFR BLD AUTO: 27.5 % (ref 35–47)
HGB BLD-MCNC: 8.3 G/DL (ref 11.7–15.7)
MCH RBC QN AUTO: 29.7 PG (ref 26.5–33)
MCHC RBC AUTO-ENTMCNC: 30.2 G/DL (ref 31.5–36.5)
MCV RBC AUTO: 99 FL (ref 78–100)
PLATELET # BLD AUTO: 53 10E9/L (ref 150–450)
POTASSIUM SERPL-SCNC: 4.6 MMOL/L (ref 3.4–5.3)
RBC # BLD AUTO: 2.79 10E12/L (ref 3.8–5.2)
SODIUM SERPL-SCNC: 140 MMOL/L (ref 133–144)
WBC # BLD AUTO: 37.4 10E9/L (ref 4–11)

## 2018-11-21 PROCEDURE — 36415 COLL VENOUS BLD VENIPUNCTURE: CPT | Performed by: HOSPITALIST

## 2018-11-21 PROCEDURE — A9270 NON-COVERED ITEM OR SERVICE: HCPCS | Performed by: HOSPITALIST

## 2018-11-21 PROCEDURE — 25000132 ZZH RX MED GY IP 250 OP 250 PS 637: Performed by: INTERNAL MEDICINE

## 2018-11-21 PROCEDURE — 25000132 ZZH RX MED GY IP 250 OP 250 PS 637: Performed by: PHYSICIAN ASSISTANT

## 2018-11-21 PROCEDURE — 25000132 ZZH RX MED GY IP 250 OP 250 PS 637: Performed by: HOSPITALIST

## 2018-11-21 PROCEDURE — A9270 NON-COVERED ITEM OR SERVICE: HCPCS | Performed by: ORTHOPAEDIC SURGERY

## 2018-11-21 PROCEDURE — 40000193 ZZH STATISTIC PT WARD VISIT: Performed by: PHYSICAL THERAPY ASSISTANT

## 2018-11-21 PROCEDURE — 40000274 ZZH STATISTIC RCP CONSULT EA 30 MIN

## 2018-11-21 PROCEDURE — 40000275 ZZH STATISTIC RCP TIME EA 10 MIN

## 2018-11-21 PROCEDURE — 97530 THERAPEUTIC ACTIVITIES: CPT | Mod: GP | Performed by: PHYSICAL THERAPY ASSISTANT

## 2018-11-21 PROCEDURE — 85027 COMPLETE CBC AUTOMATED: CPT | Performed by: HOSPITALIST

## 2018-11-21 PROCEDURE — 80048 BASIC METABOLIC PNL TOTAL CA: CPT | Performed by: HOSPITALIST

## 2018-11-21 PROCEDURE — 94640 AIRWAY INHALATION TREATMENT: CPT

## 2018-11-21 PROCEDURE — 82947 ASSAY GLUCOSE BLOOD QUANT: CPT | Performed by: HOSPITALIST

## 2018-11-21 PROCEDURE — 25000132 ZZH RX MED GY IP 250 OP 250 PS 637: Performed by: ORTHOPAEDIC SURGERY

## 2018-11-21 PROCEDURE — 99239 HOSP IP/OBS DSCHRG MGMT >30: CPT | Performed by: HOSPITALIST

## 2018-11-21 PROCEDURE — A9270 NON-COVERED ITEM OR SERVICE: HCPCS | Performed by: PHYSICIAN ASSISTANT

## 2018-11-21 PROCEDURE — A9270 NON-COVERED ITEM OR SERVICE: HCPCS | Performed by: INTERNAL MEDICINE

## 2018-11-21 PROCEDURE — 12000000 ZZH R&B MED SURG/OB

## 2018-11-21 RX ORDER — TRAZODONE HYDROCHLORIDE 100 MG/1
100 TABLET ORAL AT BEDTIME
Status: DISCONTINUED | OUTPATIENT
Start: 2018-11-21 | End: 2018-11-22 | Stop reason: HOSPADM

## 2018-11-21 RX ORDER — GABAPENTIN 300 MG/1
300 CAPSULE ORAL 2 TIMES DAILY
Status: DISCONTINUED | OUTPATIENT
Start: 2018-11-21 | End: 2018-11-22 | Stop reason: HOSPADM

## 2018-11-21 RX ORDER — TRAMADOL HYDROCHLORIDE 50 MG/1
50 TABLET ORAL EVERY 4 HOURS PRN
Status: DISCONTINUED | OUTPATIENT
Start: 2018-11-21 | End: 2018-11-22 | Stop reason: HOSPADM

## 2018-11-21 RX ORDER — TRAMADOL HYDROCHLORIDE 50 MG/1
50 TABLET ORAL EVERY 4 HOURS PRN
Qty: 60 TABLET | Refills: 0 | Status: SHIPPED | DISCHARGE
Start: 2018-11-21 | End: 2018-11-29

## 2018-11-21 RX ADMIN — TRAMADOL HYDROCHLORIDE 50 MG: 50 TABLET, COATED ORAL at 13:34

## 2018-11-21 RX ADMIN — FLUTICASONE FUROATE AND VILANTEROL TRIFENATATE 1 PUFF: 100; 25 POWDER RESPIRATORY (INHALATION) at 11:04

## 2018-11-21 RX ADMIN — RANITIDINE 150 MG: 150 TABLET ORAL at 09:04

## 2018-11-21 RX ADMIN — SENNOSIDES AND DOCUSATE SODIUM 2 TABLET: 8.6; 5 TABLET ORAL at 09:03

## 2018-11-21 RX ADMIN — TRAZODONE HYDROCHLORIDE 100 MG: 100 TABLET ORAL at 21:51

## 2018-11-21 RX ADMIN — OXYCODONE HYDROCHLORIDE 5 MG: 5 TABLET ORAL at 07:17

## 2018-11-21 RX ADMIN — ATORVASTATIN CALCIUM 20 MG: 20 TABLET, FILM COATED ORAL at 09:04

## 2018-11-21 RX ADMIN — ACETAMINOPHEN 975 MG: 325 TABLET, FILM COATED ORAL at 09:03

## 2018-11-21 RX ADMIN — ACETAMINOPHEN 975 MG: 325 TABLET, FILM COATED ORAL at 17:38

## 2018-11-21 RX ADMIN — GABAPENTIN 300 MG: 300 CAPSULE ORAL at 21:51

## 2018-11-21 RX ADMIN — LEVOTHYROXINE SODIUM 75 MCG: 75 TABLET ORAL at 09:04

## 2018-11-21 RX ADMIN — RANITIDINE 150 MG: 150 TABLET ORAL at 19:38

## 2018-11-21 ASSESSMENT — ACTIVITIES OF DAILY LIVING (ADL)
ADLS_ACUITY_SCORE: 13
ADLS_ACUITY_SCORE: 11
ADLS_ACUITY_SCORE: 13

## 2018-11-21 NOTE — PROGRESS NOTES
SWS     D: Discharge planning continuing.. per contact today with [facility] , Angelica rockwell will determine tomorrow pt's readiness for discharge. SW will await determination and continue planning accordingly in coordination with Angelica in anticipation of pt's transfer to [facility] who has assessed and will be able to accept pt tomorrow if she is medically ready for transfer. SW will await determination if family will provide transport, or if medical transport is needed, it is noted that pt is currently on o2, per respiratory therapy note pt uses home o2 at night, will ask that family bring in o2 tank from home for transport.      A/P: Continuing planning as noted above in anticipation of discharge to rehab facility pending MD determination of discharge date.       .PAS-RR    D: Per DHS regulation, JOSIAS completed and submitted PAS-RR to [place] [employer] Direct Connect via the [employer] Link[employer] Line.  PAS-RR confirmation # is : [id number]

## 2018-11-21 NOTE — DISCHARGE SUMMARY
Hospitalist Discharge Summary  [facility]    Marie Kline MRN# [id number]   YOB: 1932 Age: 86 year old     Date of Admission:  11/18/2018  Date of Discharge:  11/21/2018  Admitting Physician:  Scar Reynolds MD  Discharge Physician:  Wenceslao Craig  Discharging Service:  Hospitalist     Primary Provider: Piper Kiser          Discharge Diagnosis:   Left femoral neck fracture status post repair  Mechanical fall  CLL  Bladder cancer  History of stress cardiomyopathy  COPD  Depression  Hyperlipidemia  Hypothyroidism  Blood loss anemia  Postoperative hypotension             Discharge Disposition:   Discharged to rehabilitation facility           Allergies:   Allergies   Allergen Reactions     Diagnostic X-Ray Materials Hives     CT DYE     Contrast Dye Hives     CT DYE     Wound Dressing Adhesive               Discharge Medications:   Current Discharge Medication List      START taking these medications    Details   acetaminophen (TYLENOL) 325 MG tablet Take 2 tablets (650 mg) by mouth every 4 hours as needed for mild pain  Qty: 40 tablet, Refills: 0    Associated Diagnoses: Fracture of hip, left, closed, initial encounter (H)      aspirin 325 MG EC tablet Take 1 tablet (325 mg) by mouth daily  Qty: 45 tablet, Refills: 0    Associated Diagnoses: Fracture of hip, left, closed, initial encounter (H)      ondansetron (ZOFRAN-ODT) 4 MG ODT tab Take 1 tablet (4 mg) by mouth every 6 hours as needed for nausea or vomiting  Qty: 20 tablet, Refills: 0    Associated Diagnoses: Fracture of hip, left, closed, initial encounter (H)      oxyCODONE IR (ROXICODONE) 5 MG tablet Take 1 tablet (5 mg) by mouth every 4 hours as needed for moderate to severe pain  Qty: 30 tablet, Refills: 0    Associated Diagnoses: Fracture of hip, left, closed, initial encounter (H)      senna-docusate (SENOKOT-S;PERICOLACE) 8.6-50 MG per tablet Take 2 tablets by mouth 2 times daily  Qty: 40 tablet, Refills:  0    Associated Diagnoses: Fracture of hip, left, closed, initial encounter (H)         CONTINUE these medications which have NOT CHANGED    Details   albuterol (2.5 MG/3ML) 0.083% neb solution Take 2.5 mg by nebulization 2 times daily      albuterol (PROAIR HFA/PROVENTIL HFA/VENTOLIN HFA) 108 (90 BASE) MCG/ACT Inhaler Inhale 2 puffs into the lungs every 6 hours as needed for wheezing  Qty: 1 Inhaler, Refills: 8    Associated Diagnoses: Intermittent asthma without complication      atorvastatin (LIPITOR) 20 MG tablet TAKE ONE TABLET BY MOUTH ONE TIME DAILY   Qty: 90 tablet, Refills: 1    Associated Diagnoses: ACS (acute coronary syndrome) (H); Hyperlipidemia with target LDL less than 130      budesonide-formoterol (SYMBICORT) 80-4.5 MCG/ACT Inhaler Inhale 2 puffs into the lungs 2 times daily  Qty: 3 Inhaler, Refills: 1    Associated Diagnoses: COPD exacerbation (H)      denosumab (PROLIA) 60 MG/ML SOLN injection Inject 1 mL (60 mg) Subcutaneous every 6 months  Qty: 1 mL, Refills: 0    Associated Diagnoses: Senile osteoporosis; CKD (chronic kidney disease) stage 3, GFR 30-59 ml/min (H)      ferrous sulfate (IRON) 325 (65 FE) MG tablet Take by mouth daily (with breakfast)      furosemide (LASIX) 20 MG tablet TAKE ONE TABLET BY MOUTH ONE TIME DAILY   Qty: 90 tablet, Refills: 1    Associated Diagnoses: Localized edema      gabapentin (NEURONTIN) 300 MG capsule Take 300 mg by mouth 2 times daily      levothyroxine (SYNTHROID/LEVOTHROID) 75 MCG tablet TAKE ONE TABLET BY MOUTH ONE TIME DAILY   Qty: 90 tablet, Refills: 1    Associated Diagnoses: Other specified hypothyroidism      metoprolol succinate (TOPROL-XL) 25 MG 24 hr tablet Take 1 tablet (25 mg) by mouth daily  Qty: 90 tablet, Refills: 3    Associated Diagnoses: ACS (acute coronary syndrome) (H)      Multiple Vitamins-Minerals (MULTIVITAMIN GUMMIES ADULT PO) Take by mouth daily       traZODone (DESYREL) 50 MG tablet Take 100 mg by mouth At Bedtime      order for DME  Equipment being ordered: Nebulizer  Fax order to [facility] [phone]  Qty: 1 each, Refills: 0    Associated Diagnoses: COPD exacerbation (H)                    Condition on Discharge:   Discharge condition: Stable   Discharge vitals: Blood pressure 114/50, pulse 64, temperature 97  F (36.1  C), resp. rate 16, weight 47.2 kg (104 lb), SpO2 94 %, not currently breastfeeding.   Code status on discharge: Full Code      BASIC PHYSICAL EXAMINATION:  GENERAL: No apparent distress.  CARDIOVASCULAR: Regular rate and rhythm without murmurs.  PULMONARY: Clear to auscultation bilaterally.   GASTROINTESTINAL: Abdomen soft, non-tender.  EXTREMITIES: No edema, pulses intact.  NEUROLOGIC: No focal deficits.            History of Illness:   See detailed admission note for full details.               Procedures excluding imaging which is summarized below:   Please see details in the electronic medical record.           Consultations:   ORTHOPEDIC SURGERY IP CONSULT  HOSPITALIST IP CONSULT  OCCUPATIONAL THERAPY ADULT IP CONSULT  PHYSICAL THERAPY ADULT IP CONSULT  PHYSICAL THERAPY ADULT IP CONSULT  OCCUPATIONAL THERAPY ADULT IP CONSULT  CARE TRANSITION RN/SW IP CONSULT          Significant Results:   Results for orders placed or performed during the hospital encounter of 11/18/18   XR Pelvis and Hip Left 2 Views    Narrative    PELVIS WITH UNILATERAL HIP TWO VIEWS LEFT  11/18/2018 7:41 PM     HISTORY: Fall, left hip pain.     COMPARISON: None.      Impression    IMPRESSION: Left femoral neck fracture.    TANISHA PRIETO MD   XR Pelvis w Hip Port Left 1 View    Narrative    PELVIS AND HIP PORTABLE LEFT ONE VIEW   11/19/2018 11:52 AM     HISTORY: Postoperative.     COMPARISON: 11/18/2018      Impression    IMPRESSION: Interval left total hip replacement. No fracture or  dislocation. Right hip unremarkable.    PADMINI MOULTON MD       Transthoracic Echocardiogram Results:  No results found for this or any previous visit  (from the past 4320 hour(s)).             Pending Results:   Unresulted Labs Ordered in the Past 30 Days of this Admission     Date and Time Order Name Status Description    10/23/2018 1139 Chromosome malignant tissue In process                       Discharge Instructions and Follow-Up:   Discharge instructions and follow-up:   Discharge Procedure Orders  General info for SNF   Order Comments: Length of Stay Estimate: Short Term Care: Estimated # of Days <30  Condition at Discharge: Improving  Level of care:skilled   Rehabilitation Potential: Good  Admission H&P remains valid and up-to-date: Yes  Recent Chemotherapy: N/A  Use Nursing Home Standing Orders: Yes     Mantoux instructions   Order Comments: Give two-step Mantoux (PPD) Per Facility Policy Yes     Reason for your hospital stay   Order Comments: Left femoral neck fracture, left hip hemiarthroplasty     Wound care   Order Comments: Site:   Left hip  Instructions:  Leave dressing intact if Aquacel and remove at POD #7, remove staples POD #14  Daily dressing changes with gauze and tape     Follow Up and recommended labs and tests   Order Comments: Follow up with Dr. Reynolds in 2 weeks.  No follow up labs or test are needed.  Call for appointment [phone]     Activity - Up with assistive device   Order Specific Question Answer Comments   Is discharge order? Yes      Weight bearing status     Physical Therapy Adult Consult   Order Comments: Evaluate and treat as clinically indicated.    Reason:  left hip hemiarthroplasty     Occupational Therapy Adult Consult   Order Comments: Evaluate and treat as clinically indicated.    Reason:  left hip hemiarthroplasty     Hip precautions     Fall precautions     Advance Diet as Tolerated   Order Comments: Follow this diet upon discharge: Orders Placed This Encounter     Advance Diet as Tolerated: Regular Diet Adult   Order Specific Question Answer Comments   Is discharge order? Yes                Hospital Course:    Summary of Stay: Marie Kline is a 86 year old female with a PMH significant for CLL, bladder cancer, stress cardiomyopathy 10/2014 with complete resolution (EF 60-65% echo 3/2017), COPD, hypothyroidism, HLD, depression who presented for evaluation of hip pain after a fall.  Underwent operative repair this afternoon with reported 100 cc of blood loss.  Was hypotensive throughout the procedure which has continued since reaching the floor but now resolved.  She currently feels well and has no complaints.  She is recovering well and will discharge to TCU today.  She has mild hypoxia and will discharge with 1 L of supplemental oxygen.  This is in the context of being elderly with recent surgery and likely atelectasis with underlying history of tobacco dependence and COPD not in exacerbation.      1. Left femoral neck fracture secondary to mechanical fall: Patient was going into her living room to make a phone call and while walking in the dark ran into the glass coffee table and tripped over it, scraping her shins and landing on her left hip. She thinks she heard a crack when she fell. She was unable to get up or bear any weight since that injury and activated her Life Alert to be brought to the ER.  Underwent operative repair 11/19, she was hypotensive the evening following surgery but this is now improved and she is doing remarkably well.      2. Lower extremity skin tears: Large skin tears over shins bilaterally, bandaged with steri-strips by ER physician. No active bleeding.      3. Right inguinal pain/swelling: In setting of previous excisional biopsy for right inguinal adenopathy performed by Dr. De Paz, done at request of Dr. Ivory to assess if progression or transformation of her disease. Not clear what results showed/indication but patient reports she discussed with Dr. Ivory.       4. CLL: At least stage III per oncology notes. Diagnosed in 4/2007 and has been receiving IVIG every 4 weeks, last infusion  was 10/31. Has chronic high white count. Has persistent right inguinal adenopathy as well as mediastinal and cervical adenopathy. Excisional biopsy of right inguinal adenopathy was completed 10/23 to assess if progression or transformation of her disease. Dr. Ivory has recommended chemotherapy but patient has chosen forgo doing this until after the holiday season, so they are re-visiting this in the next calendar year.      5. Bladder cancer: Having BCG treatment w/ Dr. Nuñez. F/u cysto 5/2016 without evidence of recurrence so receives 3 weekly doses of BCG maintenance therapy. 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 She gets cystos every 6 months for monitoring.      6. History of stress-induced cardiomyopathy: History of stress cardiomyopathy 10/2014 in setting of selling her home (dream home where she lived with her ) with subsequent resolution of wall motion abnormalities and normalization of LV function. Coronary angiogram at that time showed normal coronary arteries. Has followed w/ Dr. Ortega in the past and remains on beta blocker therapy. Echo 3/2017 showed EF of 60-65%.  -Resume PTA Toprol given hypotension  -Resume lasix       7. COPD: Former smoker of 60 pack years, quit in 2009. Worsening of chronic cough with more production than her baseline. Using an albuterol nebulizer, Breo, and Ventolin inhaler routinely. Has not established care with a pulmonologist yet, but did receive a referral from oncology clinic to do so. Not on home O2 at baseline but mild hypoxia now and will discharge with 1 L of oxygen by nasal cannula which I suspect will be weaned off and is more related to atelectasis      8. Hypothyroidism: Continue levothyroxine 75 mcg daily.      9. HLD: Continue atorvastatin 20 mg daily.      10. Insomnia: Continue PTA trazodone.     11.  Acute blood loss anemia: Decrease in her hemoglobin as expected from surgery and hemodilution.  No indication for  transfusion at this point.    The patient was seen, examined, and counseled on this day. The hospitalization and plan of care was reviewed with the patient extensively. All questions were addressed and the patient agreed to follow-up as noted above.      Total time spent in face to face contact with the patient and coordinating discharge was:  35 Minutes    Wenceslao Craig DO, MPH  [facility] Hospitalist  [address] [place] [address]  Pager: [phone]  11/21/2018

## 2018-11-21 NOTE — PROGRESS NOTES
"Date:11/21/2018  Admission Dx: Fall, Hip Fx  Pulmonary History: COPD  Home Nebulizer/MDI Use: Symbicort BID, Albuterol prn  Home Oxygen: 2L at noc   Acuity Level (RCAT flow sheet): 4    Aerosol Therapy initiated: Albuterol prn    Pulmonary Hygiene initiated: Deep breathe and cough    Volume Expansion initiated: IS    Current Oxygen Requirements: 1LPM Nc    Current SpO2: 92%    Re-evaluation date: 28 Nov    Patient Education: Patient informed of medication benefits and possible side effects.    See \"RT Assessments\" flow sheet for patient assessment scoring and Acuity Level Details.           "

## 2018-11-21 NOTE — PROGRESS NOTES
.Discharge Planner   Discharge Plans in progress: TCU  Barriers to discharge plan: none anticipated  Follow up plan: await MD determination of discharge date, continue planning accordingly.        Entered by: TINO Nunez 11/21/2018 1:37 PM

## 2018-11-21 NOTE — PLAN OF CARE
Problem: Patient Care Overview  Goal: Plan of Care/Patient Progress Review  Discharge Planner PT   Patient plan for discharge: rehab .  Angelica cueva  Current status: Min to Mod A for bed mobility transfers to commode and limited walking distances to less than 10 feet  Barriers to return to prior living situation: mobility tolerance to activity  Recommendations for discharge: TCU per plan established by the PT. And BPCI plan   Rationale for recommendations: family considering private car transport will need to be able to sit in chair and do tranfers with less assist.       Entered by: Cherise Soto 11/21/2018 10:42 AM

## 2018-11-21 NOTE — PROGRESS NOTES
Orthopedic Surgery  11/21/2018    S: Patient voices no complaints today.     O: Blood pressure 116/54, pulse 64, temperature 97.1  F (36.2  C), temperature source Oral, resp. rate 16, weight 47.2 kg (104 lb), SpO2 94 %, not currently breastfeeding.  Lab Results   Component Value Date    HGB 8.3 11/21/2018     Lab Results   Component Value Date    INR 1.00 08/24/2018        Neurovascularly intact.  Calves are negative bilaterally, both soft and nontender.  The wound is C/D/I.  The wound looks good with minimal erythema of the surrounding skin.    A: Ms. Kline is doing well status post Procedure(s):  Left bipolar hemiarthroplasty.    P: Continue physical therapy.   Anticoagulation   Pain management  Discharge planning    Scar Reynolds  [phone]

## 2018-11-21 NOTE — PLAN OF CARE
Problem: Patient Care Overview  Goal: Plan of Care/Patient Progress Review  Outcome: Improving  RN  A/O.  VSS, afebrile.   Unable to wean off oxygen, maintaining saturations on 2L NC.  Pain managed with tramadol/tylenol and ice.  CMS intact.  Aquacel with small amt of dried drainage.  Multiple skin tears on BLE and LUE, all dressings CDI.  Up with A1 and WW pivoting to BSC and chair.  Tolerating regular diet.  Voiding adequately.  Plan is discharge to TCU tomorrow.  Will continue to monitor.

## 2018-11-21 NOTE — PLAN OF CARE
Problem: Patient Care Overview  Goal: Plan of Care/Patient Progress Review  Outcome: No Change  Vss. Afebrile. Lungs clr-O2 2L mid  90s. Is done. Hypoactive bs/no gas, no nausea.Tolerating diet. Last bm 11/18/18. Voiding. Saline locked. Drsg-cdi. Cms-moderate flexion & +1 pulses. Skin has bruising with multiple skin tears (cdi drsg/intact steri-strips). Skin is very fragile. Pain managed with Tylenol/Oxycodone. Tolerating ice and repositioning. Platelet count low at 53 this am, down from 66. Dr lawton concerning Lovenox. Orders placed discontinuing the Lovenox. Day shift nurse will address alternate anticoagulant therapy with Ortho surgeon. No other significant issues noted overnight.

## 2018-11-22 ENCOUNTER — APPOINTMENT (OUTPATIENT)
Dept: PHYSICAL THERAPY | Facility: CLINIC | Age: 83
DRG: 470 | End: 2018-11-22
Payer: MEDICARE

## 2018-11-22 VITALS
SYSTOLIC BLOOD PRESSURE: 111 MMHG | WEIGHT: 104 LBS | TEMPERATURE: 98 F | BODY MASS INDEX: 21.01 KG/M2 | DIASTOLIC BLOOD PRESSURE: 60 MMHG | RESPIRATION RATE: 16 BRPM | OXYGEN SATURATION: 95 % | HEART RATE: 64 BPM

## 2018-11-22 PROCEDURE — 25000132 ZZH RX MED GY IP 250 OP 250 PS 637: Performed by: HOSPITALIST

## 2018-11-22 PROCEDURE — A9270 NON-COVERED ITEM OR SERVICE: HCPCS | Performed by: HOSPITALIST

## 2018-11-22 PROCEDURE — 40000193 ZZH STATISTIC PT WARD VISIT: Performed by: PHYSICAL THERAPY ASSISTANT

## 2018-11-22 PROCEDURE — 25000132 ZZH RX MED GY IP 250 OP 250 PS 637: Performed by: ORTHOPAEDIC SURGERY

## 2018-11-22 PROCEDURE — 25000132 ZZH RX MED GY IP 250 OP 250 PS 637: Performed by: INTERNAL MEDICINE

## 2018-11-22 PROCEDURE — 97530 THERAPEUTIC ACTIVITIES: CPT | Mod: GP | Performed by: PHYSICAL THERAPY ASSISTANT

## 2018-11-22 PROCEDURE — A9270 NON-COVERED ITEM OR SERVICE: HCPCS | Performed by: INTERNAL MEDICINE

## 2018-11-22 PROCEDURE — A9270 NON-COVERED ITEM OR SERVICE: HCPCS | Performed by: ORTHOPAEDIC SURGERY

## 2018-11-22 RX ADMIN — GABAPENTIN 300 MG: 300 CAPSULE ORAL at 08:46

## 2018-11-22 RX ADMIN — ACETAMINOPHEN 975 MG: 325 TABLET, FILM COATED ORAL at 08:47

## 2018-11-22 RX ADMIN — ATORVASTATIN CALCIUM 20 MG: 20 TABLET, FILM COATED ORAL at 08:46

## 2018-11-22 RX ADMIN — RANITIDINE 150 MG: 150 TABLET ORAL at 08:46

## 2018-11-22 RX ADMIN — SENNOSIDES AND DOCUSATE SODIUM 1 TABLET: 8.6; 5 TABLET ORAL at 08:47

## 2018-11-22 RX ADMIN — FLUTICASONE FUROATE AND VILANTEROL TRIFENATATE 1 PUFF: 100; 25 POWDER RESPIRATORY (INHALATION) at 08:47

## 2018-11-22 RX ADMIN — LEVOTHYROXINE SODIUM 75 MCG: 75 TABLET ORAL at 08:46

## 2018-11-22 ASSESSMENT — ACTIVITIES OF DAILY LIVING (ADL)
ADLS_ACUITY_SCORE: 12

## 2018-11-22 NOTE — PLAN OF CARE
Problem: Patient Care Overview  Goal: Plan of Care/Patient Progress Review  Nursing cares between 2180-5257  A&O. CMS intact. LS dim on 2LNC. Active BS. Reports abd cramping (had 2 cups of prune juice). Active BS, +gas. Voiding in BR.   Hip incision, dried drainage. Skin  Tears on bilat lower ext,  And left arm. Tegaderm in place.   Denies pain,N/V, and SOB.  /58  Pulse 64  Temp 98.6  F (37  C) (Oral)  Resp 16  Wt 47.2 kg (104 lb)  SpO2 96%  BMI 21.01 kg/m2  Plan: discharge to TCU

## 2018-11-22 NOTE — PROGRESS NOTES
Hospitalist Follow-up Note    Patient seen and doing well. Discharged yesterday and no change overnight. Planned to TCU today. Please see discharge summary from yesterday.     Wenceslao Craig  November 22, 2018

## 2018-11-22 NOTE — PLAN OF CARE
Problem: Patient Care Overview  Goal: Plan of Care/Patient Progress Review  Outcome: Improving  Vss. Afebrile. Lungs clr-O2 1L mid  90s (weaning). Is done. +bs/+gas, no nausea.Tolerating diet. Last bm 11/18/18. Abdominal cramping/discomfort from too many stool promoters. Voiding. Saline locked. Drsg-dried drainage. Cms-moderate flexion, +1 pulses, & 2+ edema to hip- 1+ to foot/ankle. Skin has bruising with multiple skin tears (cdi drsg/intact steri-strips). Skin is very fragile. Pain managed with Tylenol/Ultram. Tolerating ice and repositioning. Possible discharge to [facility] Tcu today.  No other significant issues noted overnight.

## 2018-11-22 NOTE — PROGRESS NOTES
"Social Work Note:    D: JOSIAS following up on discharge planning for pt to go to [facility].    I/A: JOSIAS colleague indicated in Wednesday's (11/21/18) sign-out that pt could discharge to [facility] today. JOSIAS spoke to RN who stated that pt is requiring 2 liters of oxygen and will need it for transport. JOSIAS called [facility] (Anna) who stated that pt is not on their admit list for today and that she would need to check into it. Anna asked that SW call back \"later\". JOSIAS spoke to Brandi at [facility] who stated that they can accept pt anytime. JOSIAS spoke to pt's daughter (Margaret) who stated that family will provide transportation around 1 pm after hearing that transportation might not be covered by insurance. JOSIAS stated that pt is needing oxygen and thus family needs to bring the transport tank.     P: Brandi is aware that pt will leave [facility] around 1 pm with family providing transportation. JOSIAS spoke to pt's BP CM ([phone]) to update her on the plan for discharge today. Brandi asked that orders be faxed to them at [phone].     SW: Yen Vance, MSW, LICSW   "

## 2018-11-22 NOTE — PLAN OF CARE
Problem: Patient Care Overview  Goal: Plan of Care/Patient Progress Review  Discharge Planner PT   Patient plan for discharge: TCU at 1300  Current status: able to do car transfer screen without physical assist to bathroom with needs for assist with clothing and silvio cares. Bed mobility with Min A  Barriers to return to prior living situation: mobility tolerance to activity  Recommendations for discharge: TCU per plan established by the PT. And BPCI plan     Rationale for recommendations: goals are not met and planned discharge to TCU later today. Recommend to PT for discharge.       Entered by: Cherise Soto 11/22/2018 11:43 AM

## 2018-11-22 NOTE — PLAN OF CARE
Problem: Patient Care Overview  Goal: Plan of Care/Patient Progress Review  Outcome: Adequate for Discharge Date Met: 11/22/18  Pt up A1 with walker and gait belt. All wound dressings CDI. Weaned off O2, stating 95% on RA. Had 2 BM this shift, adequately voiding. All discharge education completed, packet given to pt's dtr who is here to transport pt to [facility] TCU, all belongings collected and sent with pt. Adequate for discharge

## 2018-11-22 NOTE — PROGRESS NOTES
Orthopedic Surgery  11/22/2018  POD#: 3    S: Patient voices no complaints today. Feeling well and denies calf pain, dizziness, SOB.    O: Blood pressure 110/55, pulse 64, temperature 97.9  F (36.6  C), temperature source Oral, resp. rate 16, weight 47.2 kg (104 lb), SpO2 95 %, not currently breastfeeding.  Lab Results   Component Value Date    HGB 8.3 11/21/2018     Lab Results   Component Value Date    INR 1.00 08/24/2018        I/O last 3 completed shifts:  In: 583 [P.O.:580; I.V.:3]  Out: 1675 [Urine:1675]    Neurovascularly intact.  Calves are negative bilaterally, both soft and nontender.  The wound is C/D/I.  The wound looks good with minimal erythema of the surrounding skin.    A: Ms. Kline is doing well status post Procedure(s):  Left hip bipolar hemiarthroplasty.    P:   1. Mobilize and continue physical therapy. WBAT. No hip precautions.  2. Anticoagulation   3. Pain management  4. Anticipate discharge to [facility] today.      Melony Thompson PA-C  O: [phone]

## 2018-11-23 ENCOUNTER — NURSING HOME VISIT (OUTPATIENT)
Dept: GERIATRICS | Facility: CLINIC | Age: 83
End: 2018-11-23
Payer: MEDICARE

## 2018-11-23 ENCOUNTER — PATIENT OUTREACH (OUTPATIENT)
Dept: INTERNAL MEDICINE | Facility: CLINIC | Age: 83
End: 2018-11-23

## 2018-11-23 VITALS
BODY MASS INDEX: 25.87 KG/M2 | RESPIRATION RATE: 18 BRPM | SYSTOLIC BLOOD PRESSURE: 92 MMHG | TEMPERATURE: 98.4 F | DIASTOLIC BLOOD PRESSURE: 53 MMHG | WEIGHT: 119.9 LBS | HEIGHT: 57 IN | HEART RATE: 79 BPM | OXYGEN SATURATION: 97 %

## 2018-11-23 DIAGNOSIS — D62 ANEMIA DUE TO BLOOD LOSS, ACUTE: ICD-10-CM

## 2018-11-23 DIAGNOSIS — J44.1 COPD EXACERBATION (H): ICD-10-CM

## 2018-11-23 DIAGNOSIS — N18.30 CKD (CHRONIC KIDNEY DISEASE) STAGE 3, GFR 30-59 ML/MIN (H): ICD-10-CM

## 2018-11-23 DIAGNOSIS — R53.81 PHYSICAL DECONDITIONING: ICD-10-CM

## 2018-11-23 DIAGNOSIS — I42.8 OTHER CARDIOMYOPATHY (H): ICD-10-CM

## 2018-11-23 DIAGNOSIS — S72.002D CLOSED FRACTURE OF NECK OF LEFT FEMUR WITH ROUTINE HEALING, SUBSEQUENT ENCOUNTER: Primary | ICD-10-CM

## 2018-11-23 DIAGNOSIS — C91.10 CLL (CHRONIC LYMPHOCYTIC LEUKEMIA) (H): ICD-10-CM

## 2018-11-23 PROCEDURE — 99310 SBSQ NF CARE HIGH MDM 45: CPT | Performed by: NURSE PRACTITIONER

## 2018-11-23 NOTE — PROGRESS NOTES
[place] [facility]  PRIMARY CARE PROVIDER AND CLINIC:  Piper Kiser [address] [facility] [address]  Chief Complaint   Patient presents with     Hospital F/U     [place] Medical Record Number:  [id number]  Place of Service where encounter took place:  [facility] ([facility]) [[id number]]    HPI:    Marie Kline is a 86 year old  (4/28/1932), PMH of CLL, bladder caner, stress cardiomyopathy which has resolved, (EF 60-65%) COPD, Hypothyroidism, HLC, depression who presents after a fall with hip pain admitted to the above facility from  [facility].  Hospital stay 11/18/18 through 11/21/18.    Left Femoral neck Fx:  S/p  ORIF   Anemia: Hgb 8.3 at discharge, no transfusion required  CLL stage III, followed by oncology Dr. Ivory  Bladder CA: Followed by Dr. Nuñez    COPD: Not followed  By pulmonologist, recommended by oncology.  Hypoxic during hospitalization needing O2 at dischargeAdmitted to this facility for  rehab, medical management and nursing care.  HPI information obtained from: facility chart records, facility staff, patient report and [facility] chart review.  Current issues are:    On exam today patient is resting in bed, just finished working with therapy, patient reports pain in left hip rated 8/10 with therapy and weight bearing, patient states less pain at rest, patient is on O2, denies SOB at rest, states she has BENJAMIN, denies fever, chills, cough, congestion, CP, palpitations, N/V/D states she had BM twice yesterday, patient states she slept well last night, no other concerns at this time.     BP ranges:  92/53  115/71  103/54  118/57  103/69  106/61    HR: 79-99  R: 16-18  O2 Sats: 90-97%    Admission Weight 11/22: 119.9lb      CODE STATUS/ADVANCE DIRECTIVES DISCUSSION:   CPR/Full code   Patient's living condition: lives alone    ALLERGIES:Diagnostic x-ray materials; Contrast dye; and Wound dressing adhesive  PAST MEDICAL HISTORY:  has a past medical history of  Arthritis; Bladder cancer (H); Bladder cancer (H); Cardiomyopathy (H); CLL (chronic lymphocytic leukemia) (H); Congestive heart failure (H) (10/24/2014); COPD (chronic obstructive pulmonary disease) (H); Hypertension; Hypothyroid; Mumps; Myocardial infarction (H) (10/2014); Pulmonary edema cardiac cause (H) (10/08/2014); and Tricuspid valve disorders, 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 She also has no past medical history of Asymptomatic human immunodeficiency virus (HIV) infection status (H); Cerebral palsy (H); Chronic infection; Complication of anesthesia; Congenital renal agenesis and dysgenesis; Goiter; Gout; Hernia, abdominal; History of blood transfusion; History of spinal cord injury; History of thrombophlebitis; Horseshoe kidney; Hydrocephalus; Malignant hyperthermia; Palpitations; Parkinsons disease (H); Sleep apnea; Spider veins; Spina bifida (H); STD (sexually transmitted disease); Tethered cord (H); or Tuberculosis.  PAST SURGICAL HISTORY:  has a past surgical history that includes Coronary Angiography Adult Order (10/2014); Cystoscopy, biopsy bladder, combined (N/A, 2/9/2016); Cystoscopy, transurethral resection (TUR) tumor bladder, combined (N/A, 2/9/2016); Esophagoscopy, gastroscopy, duodenoscopy (EGD), combined (N/A, 6/22/2016); Cystoscopy; Bladder surgery; Biopsy Lymph Node Inguinal (Right, 10/23/2018); and Open reduction internal fixation hip bipolar (Left, 11/19/2018).  FAMILY HISTORY: family history includes Cancer in her father; Cerebrovascular Disease in her mother.  SOCIAL HISTORY:  reports that she quit smoking about 22 years ago. Her smoking use included Cigarettes. She has never used smokeless tobacco. She reports that she does not drink alcohol or use illicit drugs.    Post Discharge Medication Reconciliation Status: discharge medications reconciled, continue medications without change.  Current Outpatient Prescriptions   Medication Sig Dispense Refill     acetaminophen (TYLENOL) 325 MG  tablet Take 2 tablets (650 mg) by mouth every 4 hours as needed for mild pain 40 tablet 0     albuterol (2.5 MG/3ML) 0.083% neb solution Take 2.5 mg by nebulization 2 times daily       albuterol (PROAIR HFA/PROVENTIL HFA/VENTOLIN HFA) 108 (90 BASE) MCG/ACT Inhaler Inhale 2 puffs into the lungs every 6 hours as needed for wheezing 1 Inhaler 8     aspirin 325 MG EC tablet Take 1 tablet (325 mg) by mouth daily 45 tablet 0     atorvastatin (LIPITOR) 20 MG tablet TAKE ONE TABLET BY MOUTH ONE TIME DAILY  90 tablet 1     budesonide-formoterol (SYMBICORT) 80-4.5 MCG/ACT Inhaler Inhale 2 puffs into the lungs 2 times daily 3 Inhaler 1     ferrous sulfate (IRON) 325 (65 FE) MG tablet Take by mouth daily (with breakfast)       furosemide (LASIX) 20 MG tablet TAKE ONE TABLET BY MOUTH ONE TIME DAILY  90 tablet 1     gabapentin (NEURONTIN) 300 MG capsule Take 300 mg by mouth 2 times daily       levothyroxine (SYNTHROID/LEVOTHROID) 75 MCG tablet TAKE ONE TABLET BY MOUTH ONE TIME DAILY  90 tablet 1     metoprolol succinate (TOPROL-XL) 25 MG 24 hr tablet Take 1 tablet (25 mg) by mouth daily 90 tablet 3     ondansetron (ZOFRAN-ODT) 4 MG ODT tab Take 1 tablet (4 mg) by mouth every 6 hours as needed for nausea or vomiting 20 tablet 0     order for DME Equipment being ordered: Nebulizer  Fax order to [facility] [phone] 1 each 0     senna-docusate (SENOKOT-S;PERICOLACE) 8.6-50 MG per tablet Take 2 tablets by mouth 2 times daily 40 tablet 0     traMADol (ULTRAM) 50 MG tablet Take 1 tablet (50 mg) by mouth every 4 hours as needed for moderate pain 60 tablet 0     traZODone (DESYREL) 50 MG tablet Take 100 mg by mouth At Bedtime       denosumab (PROLIA) 60 MG/ML SOLN injection Inject 1 mL (60 mg) Subcutaneous every 6 months 1 mL 0     Multiple Vitamins-Minerals (MULTIVITAMIN GUMMIES ADULT PO) Take by mouth daily          ROS:  10 point ROS of systems including Constitutional, Eyes, Respiratory, Cardiovascular,  "Gastroenterology, Genitourinary, Integumentary, Musculoskeletal, Psychiatric were all negative except for pertinent positives noted in my HPI.    Exam:  BP 92/53  Pulse 79  Temp 98.4  F (36.9  C)  Resp 18  Ht 4' 9\" (1.448 m)  Wt 119 lb 14.4 oz (54.4 kg)  SpO2 97%  BMI 25.95 kg/m2  GENERAL APPEARANCE:  Alert, in no distress  ENT:  Mouth and posterior oropharynx normal, moist mucous membranes, [place]  EYES:  EOM, conjunctivae, lids, pupils and irises normal, PERRL  RESP:  respiratory effort and palpation of chest normal, lungs clear to auscultation , no respiratory distress, diminished breath sounds bases bilaterally  CV:  Palpation and auscultation of heart done , regular rate and rhythm, no murmur, rub, or gallop, peripheral edema trace+ in LLE  ABDOMEN:  normal bowel sounds, soft, nontender, no hepatosplenomegaly or other masses  M/S:   Examination of:   right upper extremity, left upper extremity and right lower extremity  Inspection, ROM, stability and muscle strength normal and decreased ROM and strength LLE  SKIN:  Inspection of skin and subcutaneous tissue baseline, did not visualize left hip incision at time of exam  NEURO:   Cranial nerves 2-12 are normal tested and grossly at patient's baseline, speech WNL  PSYCH:  affect and mood normal    Lab/Diagnostic data:     CBC RESULTS:   Recent Labs   Lab Test  11/21/18   0603  11/20/18   0629   WBC  37.4*  52.9*   RBC  2.79*  2.84*   HGB  8.3*  8.3*   HCT  27.5*  27.9*   MCV  99  98   MCH  29.7  29.2   MCHC  30.2*  29.7*   RDW  17.2*  17.2*   PLT  53*  66*       Last Basic Metabolic Panel:  Recent Labs   Lab Test  11/21/18   0603  11/20/18   0629   NA  140  138   POTASSIUM  4.6  5.0   CHLORIDE  107  105   CARLOS  7.4*  7.2*   CO2  34*  33*   BUN  7  11   CR  0.98  1.14*   GLC  87  102*       Liver Function Studies -   Recent Labs   Lab Test 02/19/18 09/12/17 03/27/17   0550  03/24/17   0545   PROTTOTAL   --    --   4.4*  4.1*   ALBUMIN   --    --   2.4*  2.2* "   BILITOTAL   --    --   0.3  0.2   ALKPHOS   --    --   87  98   AST  21  30  23  15   ALT  15  21  27  25       TSH   Date Value Ref Range Status   03/12/2018 7.50 (H) 0.40 - 4.00 mU/L Final   09/12/2017 2.54 0.32 - 5.0 uIU/mL Final     Lab Results   Component Value Date    INR 1.00 08/24/2018     Lab Results   Component Value Date    A1C 5.6 06/17/2016    A1C 5.6 06/16/2016       ASSESSMENT/PLAN:  Closed fracture of neck of left femur with routine healing, subsequent encounter  Physical deconditioning  Acute/ongoing: PT and OT for strengthening, schedule tylenol 650mg TID and q 6 hours prn, increase tramadol to 50 -100 mg q 4 hours prn, ASA 325mg QD    Anemia due to blood loss, acute  Acute: Hgb twice weekly, continue ferrous sulfate 325mg QD    COPD exacerbation (H)  Ongoing: monitor SaO2 at rest and with activity, wean off O2, continue symbicort 80-4.5mcg/act 2 puffs BID, albuterol neb BID scheduled,  albuterol MDI 2 puffs q 6 hours prn  IS QID and prn    Other cardiomyopathy (H)  Chronic/ongoing: daily weights, vitals daily and prn, BMP follow, continue lasix 20mg QD, toprol xl 25mg QD    CKD (chronic kidney disease) stage 3, GFR 30-59 ml/min (H)  Ongoing: BMP twice weekly, follow    CLL (chronic lymphocytic leukemia) (HCC)  Ongoing: followed by oncology Dr. Ivory       Orders:  BMP and Hgb twice weekly Mon and thurs  Schedule tylenol 650mg TID and q 6 hours prn  Daily weights  Wean off O2  IS QID and prn  Tramadol 50-100mg q 4 hours prn    Total time spent with patient visit at the skilled nursing facility was 45 min including patient visit and review of past records. Greater than 50% of total time spent with counseling and coordinating care due to update orders    Electronically signed by:  Tonya Lynn Haase, APRN CNP

## 2018-11-23 NOTE — LETTER
[facility]   To:             Please give to facility    From:     Babs Garcia RN-BSN   Care Coordination  Phone:  [phone]  Email: [email or web address]    Patient Name:  Marie Kline Date of Birth: [birth year]   Admit date: 11/22/18      *Information Needed:  Please contact me when the patient will discharge (or if they will move to long term care)- include the discharge date, disposition, and main diagnosis   - If the patient is discharged with home care services, please provide the name of the agency    Also- Please inform me if a care conference is being held.   Phone, Fax or Email with information

## 2018-11-23 NOTE — PROGRESS NOTES
Clinic Care Coordination Contact  Care Coordination Transition Communication    Referral Source: Home Care    Clinical Data: Patient was hospitalized at [facility]  from 11/18/18 to 11/22/18 with diagnosis of hip fracture after fall.  Patient is S/P ORIF on 11/19/18.  discharged to TCU for continued therapies.    Transition to Facility:              Facility Name: [facility]              Contact name and phone number/fax: Social Work [phone]/ fax: [phone]    Plan: RN/SW Care Coordinator will await notification from facility staff informing RN/SW Care Coordinator of patient's discharge plans/needs. RN/SW Care Coordinator will review chart and outreach to facility staff every 4 weeks and as needed.     Babs Garcia RN-BSN   Care Coordination  Phone:  [phone]  Email: [email or web address]

## 2018-11-23 NOTE — LETTER
11/23/2018        RE: Marie Kline  [address]  [address]  [place] [address]        [place] [facility]  PRIMARY CARE PROVIDER AND CLINIC:  Piper Kiser [address] [facility] [address]  Chief Complaint   Patient presents with     Hospital F/U     [place] Medical Record Number:  [id number]  Place of Service where encounter took place:  [facility] ([facility]) [[id number]]    HPI:    Marie Kline is a 86 year old  (4/28/1932), PMH of CLL, bladder caner, stress cardiomyopathy which has resolved, (EF 60-65%) COPD, Hypothyroidism, HLC, depression who presents after a fall with hip pain admitted to the above facility from  [facility].  Hospital stay 11/18/18 through 11/21/18.    Left Femoral neck Fx:  S/p  ORIF   Anemia: Hgb 8.3 at discharge, no transfusion required  CLL stage III, followed by oncology Dr. Ivory  Bladder CA: Followed by Dr. Nuñez    COPD: Not followed  By pulmonologist, recommended by oncology.  Hypoxic during hospitalization needing O2 at dischargeAdmitted to this facility for  rehab, medical management and nursing care.  HPI information obtained from: facility chart records, facility staff, patient report and [facility] chart review.  Current issues are:    On exam today patient is resting in bed, just finished working with therapy, patient reports pain in left hip rated 8/10 with therapy and weight bearing, patient states less pain at rest, patient is on O2, denies SOB at rest, states she has BENJAMIN, denies fever, chills, cough, congestion, CP, palpitations, N/V/D states she had BM twice yesterday, patient states she slept well last night, no other concerns at this time.     BP ranges:  92/53  115/71  103/54  118/57  103/69  106/61    HR: 79-99  R: 16-18  O2 Sats: 90-97%    Admission Weight 11/22: 119.9lb      CODE STATUS/ADVANCE DIRECTIVES DISCUSSION:   CPR/Full code   Patient's living condition: lives alone    ALLERGIES:Diagnostic x-ray  materials; Contrast dye; and Wound dressing adhesive  PAST MEDICAL HISTORY:  has a past medical history of Arthritis; Bladder cancer (H); Bladder cancer (H); Cardiomyopathy (H); CLL (chronic lymphocytic leukemia) (H); Congestive heart failure (H) (10/24/2014); COPD (chronic obstructive pulmonary disease) (H); Hypertension; Hypothyroid; Mumps; Myocardial infarction (H) (10/2014); Pulmonary edema cardiac cause (H) (10/08/2014); and Tricuspid valve disorders, 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 She also has no past medical history of Asymptomatic human immunodeficiency virus (HIV) infection status (H); Cerebral palsy (H); Chronic infection; Complication of anesthesia; Congenital renal agenesis and dysgenesis; Goiter; Gout; Hernia, abdominal; History of blood transfusion; History of spinal cord injury; History of thrombophlebitis; Horseshoe kidney; Hydrocephalus; Malignant hyperthermia; Palpitations; Parkinsons disease (H); Sleep apnea; Spider veins; Spina bifida (H); STD (sexually transmitted disease); Tethered cord (H); or Tuberculosis.  PAST SURGICAL HISTORY:  has a past surgical history that includes Coronary Angiography Adult Order (10/2014); Cystoscopy, biopsy bladder, combined (N/A, 2/9/2016); Cystoscopy, transurethral resection (TUR) tumor bladder, combined (N/A, 2/9/2016); Esophagoscopy, gastroscopy, duodenoscopy (EGD), combined (N/A, 6/22/2016); Cystoscopy; Bladder surgery; Biopsy Lymph Node Inguinal (Right, 10/23/2018); and Open reduction internal fixation hip bipolar (Left, 11/19/2018).  FAMILY HISTORY: family history includes Cancer in her father; Cerebrovascular Disease in her mother.  SOCIAL HISTORY:  reports that she quit smoking about 22 years ago. Her smoking use included Cigarettes. She has never used smokeless tobacco. She reports that she does not drink alcohol or use illicit drugs.    Post Discharge Medication Reconciliation Status: discharge medications reconciled, continue medications without  change.  Current Outpatient Prescriptions   Medication Sig Dispense Refill     acetaminophen (TYLENOL) 325 MG tablet Take 2 tablets (650 mg) by mouth every 4 hours as needed for mild pain 40 tablet 0     albuterol (2.5 MG/3ML) 0.083% neb solution Take 2.5 mg by nebulization 2 times daily       albuterol (PROAIR HFA/PROVENTIL HFA/VENTOLIN HFA) 108 (90 BASE) MCG/ACT Inhaler Inhale 2 puffs into the lungs every 6 hours as needed for wheezing 1 Inhaler 8     aspirin 325 MG EC tablet Take 1 tablet (325 mg) by mouth daily 45 tablet 0     atorvastatin (LIPITOR) 20 MG tablet TAKE ONE TABLET BY MOUTH ONE TIME DAILY  90 tablet 1     budesonide-formoterol (SYMBICORT) 80-4.5 MCG/ACT Inhaler Inhale 2 puffs into the lungs 2 times daily 3 Inhaler 1     ferrous sulfate (IRON) 325 (65 FE) MG tablet Take by mouth daily (with breakfast)       furosemide (LASIX) 20 MG tablet TAKE ONE TABLET BY MOUTH ONE TIME DAILY  90 tablet 1     gabapentin (NEURONTIN) 300 MG capsule Take 300 mg by mouth 2 times daily       levothyroxine (SYNTHROID/LEVOTHROID) 75 MCG tablet TAKE ONE TABLET BY MOUTH ONE TIME DAILY  90 tablet 1     metoprolol succinate (TOPROL-XL) 25 MG 24 hr tablet Take 1 tablet (25 mg) by mouth daily 90 tablet 3     ondansetron (ZOFRAN-ODT) 4 MG ODT tab Take 1 tablet (4 mg) by mouth every 6 hours as needed for nausea or vomiting 20 tablet 0     order for DME Equipment being ordered: Nebulizer  Fax order to [facility] [phone] 1 each 0     senna-docusate (SENOKOT-S;PERICOLACE) 8.6-50 MG per tablet Take 2 tablets by mouth 2 times daily 40 tablet 0     traMADol (ULTRAM) 50 MG tablet Take 1 tablet (50 mg) by mouth every 4 hours as needed for moderate pain 60 tablet 0     traZODone (DESYREL) 50 MG tablet Take 100 mg by mouth At Bedtime       denosumab (PROLIA) 60 MG/ML SOLN injection Inject 1 mL (60 mg) Subcutaneous every 6 months 1 mL 0     Multiple Vitamins-Minerals (MULTIVITAMIN GUMMIES ADULT PO) Take by mouth daily     "      ROS:  10 point ROS of systems including Constitutional, Eyes, Respiratory, Cardiovascular, Gastroenterology, Genitourinary, Integumentary, Musculoskeletal, Psychiatric were all negative except for pertinent positives noted in my HPI.    Exam:  BP 92/53  Pulse 79  Temp 98.4  F (36.9  C)  Resp 18  Ht 4' 9\" (1.448 m)  Wt 119 lb 14.4 oz (54.4 kg)  SpO2 97%  BMI 25.95 kg/m2  GENERAL APPEARANCE:  Alert, in no distress  ENT:  Mouth and posterior oropharynx normal, moist mucous membranes, [place]  EYES:  EOM, conjunctivae, lids, pupils and irises normal, PERRL  RESP:  respiratory effort and palpation of chest normal, lungs clear to auscultation , no respiratory distress, diminished breath sounds bases bilaterally  CV:  Palpation and auscultation of heart done , regular rate and rhythm, no murmur, rub, or gallop, peripheral edema trace+ in LLE  ABDOMEN:  normal bowel sounds, soft, nontender, no hepatosplenomegaly or other masses  M/S:   Examination of:   right upper extremity, left upper extremity and right lower extremity  Inspection, ROM, stability and muscle strength normal and decreased ROM and strength LLE  SKIN:  Inspection of skin and subcutaneous tissue baseline, did not visualize left hip incision at time of exam  NEURO:   Cranial nerves 2-12 are normal tested and grossly at patient's baseline, speech WNL  PSYCH:  affect and mood normal    Lab/Diagnostic data:     CBC RESULTS:   Recent Labs   Lab Test  11/21/18   0603  11/20/18   0629   WBC  37.4*  52.9*   RBC  2.79*  2.84*   HGB  8.3*  8.3*   HCT  27.5*  27.9*   MCV  99  98   MCH  29.7  29.2   MCHC  30.2*  29.7*   RDW  17.2*  17.2*   PLT  53*  66*       Last Basic Metabolic Panel:  Recent Labs   Lab Test  11/21/18   0603  11/20/18   0629   NA  140  138   POTASSIUM  4.6  5.0   CHLORIDE  107  105   CARLOS  7.4*  7.2*   CO2  34*  33*   BUN  7  11   CR  0.98  1.14*   GLC  87  102*       Liver Function Studies -   Recent Labs   Lab Test 02/19/18 09/12/17  " 03/27/17   0550  03/24/17   0545   PROTTOTAL   --    --   4.4*  4.1*   ALBUMIN   --    --   2.4*  2.2*   BILITOTAL   --    --   0.3  0.2   ALKPHOS   --    --   87  98   AST  21  30  23  15   ALT  15  21  27  25       TSH   Date Value Ref Range Status   03/12/2018 7.50 (H) 0.40 - 4.00 mU/L Final   09/12/2017 2.54 0.32 - 5.0 uIU/mL Final     Lab Results   Component Value Date    INR 1.00 08/24/2018     Lab Results   Component Value Date    A1C 5.6 06/17/2016    A1C 5.6 06/16/2016       ASSESSMENT/PLAN:  Closed fracture of neck of left femur with routine healing, subsequent encounter  Physical deconditioning  Acute/ongoing: PT and OT for strengthening, schedule tylenol 650mg TID and q 6 hours prn, increase tramadol to 50 -100 mg q 4 hours prn, ASA 325mg QD    Anemia due to blood loss, acute  Acute: Hgb twice weekly, continue ferrous sulfate 325mg QD    COPD exacerbation (H)  Ongoing: monitor SaO2 at rest and with activity, wean off O2, continue symbicort 80-4.5mcg/act 2 puffs BID, albuterol neb BID scheduled,  albuterol MDI 2 puffs q 6 hours prn  IS QID and prn    Other cardiomyopathy (H)  Chronic/ongoing: daily weights, vitals daily and prn, BMP follow, continue lasix 20mg QD, toprol xl 25mg QD    CKD (chronic kidney disease) stage 3, GFR 30-59 ml/min (H)  Ongoing: BMP twice weekly, follow    CLL (chronic lymphocytic leukemia) (HCC)  Ongoing: followed by oncology Dr. Ivory       Orders:  BMP and Hgb twice weekly Mon and thurs  Schedule tylenol 650mg TID and q 6 hours prn  Daily weights  Wean off O2  IS QID and prn  Tramadol 50-100mg q 4 hours prn    Total time spent with patient visit at the skilled nursing facility was 45 min including patient visit and review of past records. Greater than 50% of total time spent with counseling and coordinating care due to update orders    Electronically signed by:  Tonya Lynn Haase, APRN CNP                    Sincerely,        Tonya Lynn Haase, APRN CNP

## 2018-11-26 NOTE — PROGRESS NOTES
Transition Communication Hand-off for Care Transitions to Next Level of Care Provider    Name: Marie Kline  : 1932  MRN #: [id number]  Primary Care Provider: Piper Kiser     [facility] [address]     Reason for Hospitalization:  Fracture of hip, left, closed, initial encounter (H) [S72.002A]  Skin tear of lower leg without complication, unspecified laterality, initial encounter [S81.819A]  Admit Date/Time: 2018  6:26 PM  Discharge Date: 18  Payor Source: Payor: MEDICARE / Plan: MEDICARE / Product Type: Medicare /     Readmission Assessment Measure (CARLOS MANUEL) Risk Score/category: Average          Reason for Communication Hand-off Referral: Fragility  Other FAll with frature chg in self care chg in mobility with recommnedations for TCU     Discharge Plan:  Discharge Plan:       Most Recent Value    Disposition Comments Per discussion this morning with BP  Angelica planning will be for pt's discharge to TCU. Per Angelica she has spoken with pt, request made for referrals to [facility]        Discharge Needs Assessment:  Needs       Most Recent Value    Equipment Currently Used at Home walker, rolling, wheelchair, manual, raised toilet, shower chair, cane, straight    # of Referrals Placed by CTS Post Acute Facilities      Follow-up specialty is recommended:Yes, Ortho  Follow-up plan:   Any outstanding tests or procedures:        Referrals     Future Labs/Procedures    Occupational Therapy Adult Consult     Comments:    Evaluate and treat as clinically indicated.    Reason:  left hip hemiarthroplasty    Physical Therapy Adult Consult     Comments:    Evaluate and treat as clinically indicated.    Reason:  left hip hemiarthroplasty            Key Recommendations:  CTS following 2/2 to fragility 86 year old living alone in apartment at [facility], with recommendations for TCU. Pt did well post op, Barriers to return to Apartment were  related to mobility. Pt discharged to [facility]. Please follow closely upon discharge from TCU, assessing for needs to ensure successful transition of care      Jocelin Lambert/Tahmina Larios RN CTS    AVS/Discharge Summary is the source of truth; this is a helpful guide for improved communication of patient story

## 2018-11-27 NOTE — OP NOTE
Procedure Date: 11/19/2018      Revised      PREOPERATIVE DIAGNOSIS:  Left femoral neck fracture.         POSTOPERATIVE DIAGNOSIS:  Left femoral neck fracture.         PROCEDURE:  Left bipolar hemiarthroplasty.         SURGEON:  Scar Reynolds MD         ASSISTANT:  Melony Thompson PA-C           PROCEDURE:  Left bipolar hemiarthroplasty using a Smith and Nephew Synergy femoral stem.         INDICATIONS:  The patient is a very pleasant 86-year-old female who had a left femoral neck fracture following a fall last night at home.  She was unable to ambulate following this, was taken to [facility], found to have a femoral neck fracture, admitted for definitive care.  I had a long discussion with her daughter regarding treatment options.  Given her age, activity level, nature of this injury, I think she would benefit from a bipolar hemiarthroplasty.  She understands the risks, benefits, alternatives and wishes to proceed with surgery.         NARRATIVE EVENTS:  After thorough preoperative evaluation and proper identification of the patient's extremity to be operated on, Ms. Kline was taken to the operating room where she was given a general anesthetic and placed in the left lateral decubitus position.  The left hip was prepped and draped in the usual sterile manner.  After appropriate surgical pause to confirm the patient's extremity to be operated on, and 10 minutes after the patient received 2 grams of Ancef, her left hip was approached through a lateral incision centered over the greater trochanter.  Skin and soft tissues were sharply dissected down to the tensor fascia.  The fascia was then split in line with the fibers in line with the femur, taking down the trochanteric bursa.  Bursal tissue removed.  The anterior one-third gluteus medius removed off the greater trochanter in a modified Hardinge fashion.  This took us down onto the joint capsule which was T'd.  The femoral neck fracture was easily  identified.  We made a femoral neck osteotomy 12 mm proximal to the lesser trochanter just distal to the femoral neck fracture.  We then removed the femoral head from the field.  Exposing the acetabula sized the acetabulum.  It sized to fit a size 44 bipolar a Smith and Nephew bipolar component.  We cleared the acetabulum along hematogenous fibrinous debris and then we paid our attention to the femur.  Here we removed the bulk with a 45 using Ansler reamers, reamed and then broached up to a size 12 Smith and Nephew Synergy femoral stem with a standard neck offset and a -3 neck length on our femoral head.  We got good stability, full range of motion, appropriate soft tissue tension.  So, at this point, we prepared to place our final implants using second generation cement techniques using two batches of cement, one with antibiotics.  We cemented in a size 12 Smith & Nephew Synergy femoral stem with a 10 mm distal centralizer.  Once this was solidly into position, the cement had cured, retrialed the femoral head and found that a -3 neck length and a 28 mm femoral head gave us good stability and full range of motion.  This was impacted on the trunnion with a 44 bipolar component.  The hip was thoroughly irrigated and then reduced.  The joint capsule was closed with interrupted 0 Vicryl sutures.  We closed the abductors with #5 Ethibond sutures through bone tunnels.  We closed the tensor fascia with interrupted and running 0 Vicryl sutures, and then closed skin and soft tissue with absorbable sutures and staples in the skin.  The patient was placed in a well-padded postoperative dressing and taken to the recovery room in stable condition.  She tolerated the procedure without difficulty.      Revised procedure date 18 christina ELIZABETH MD             D: 2018   T: 2018   MT: PABLITO      Name:     MAGGI RODRIGUEZ   MRN:      [id number]        Account:        [id number]   :      1932            Procedure Date: 11/19/2018      Document: [id number]

## 2018-11-29 VITALS
DIASTOLIC BLOOD PRESSURE: 58 MMHG | WEIGHT: 120 LBS | HEART RATE: 67 BPM | TEMPERATURE: 98.5 F | BODY MASS INDEX: 25.97 KG/M2 | RESPIRATION RATE: 18 BRPM | OXYGEN SATURATION: 92 % | SYSTOLIC BLOOD PRESSURE: 98 MMHG

## 2018-11-29 RX ORDER — TRAMADOL HYDROCHLORIDE 50 MG/1
50 TABLET ORAL EVERY 6 HOURS PRN
Status: ON HOLD | COMMUNITY
End: 2019-07-24

## 2018-11-30 ENCOUNTER — DISCHARGE SUMMARY NURSING HOME (OUTPATIENT)
Dept: GERIATRICS | Facility: CLINIC | Age: 83
End: 2018-11-30
Payer: MEDICARE

## 2018-11-30 DIAGNOSIS — S72.002D CLOSED FRACTURE OF NECK OF LEFT FEMUR WITH ROUTINE HEALING, SUBSEQUENT ENCOUNTER: Primary | ICD-10-CM

## 2018-11-30 DIAGNOSIS — N18.30 CKD (CHRONIC KIDNEY DISEASE) STAGE 3, GFR 30-59 ML/MIN (H): ICD-10-CM

## 2018-11-30 DIAGNOSIS — I42.8 OTHER CARDIOMYOPATHY (H): ICD-10-CM

## 2018-11-30 DIAGNOSIS — D62 ANEMIA DUE TO BLOOD LOSS, ACUTE: ICD-10-CM

## 2018-11-30 DIAGNOSIS — J44.1 COPD EXACERBATION (H): ICD-10-CM

## 2018-11-30 DIAGNOSIS — R53.81 PHYSICAL DECONDITIONING: ICD-10-CM

## 2018-11-30 DIAGNOSIS — C91.10 CLL (CHRONIC LYMPHOCYTIC LEUKEMIA) (H): ICD-10-CM

## 2018-11-30 PROCEDURE — 99316 NF DSCHRG MGMT 30 MIN+: CPT | Performed by: NURSE PRACTITIONER

## 2018-11-30 NOTE — LETTER
11/30/2018        RE: Marie Kline  [id number]  [address]  [place] [facility] [facility] [place] [id number]          [place] [facility] DISCHARGE SUMMARY    PATIENT'S NAME: Marie Kline  YOB: 1932  MEDICAL RECORD NUMBER:  [id number]  Place of Service where encounter took place:  [facility] [[id number]]    PRIMARY CARE PROVIDER AND CLINIC RESPONSIBLE AFTER TRANSFER: Piper Kiser [address] [facility] [id number]     TRANSFERRING PROVIDERS: Tonya Lynn Haase, NOHEMI REA, Dr. Tyler MD  DATE OF SNF ADMISSION:  November / 22 / 2018  DATE OF SNF (anticipated) DISCHARGE: December / 02 / 2018  DISCHARGE DISPOSITION: [facility] Provider   RECENT HOSPITALIZATION/ED:  [facility] stay 11/18/18 to 11/22/18.     CODE STATUS/ADVANCE DIRECTIVES DISCUSSION:   CPR/Full code      Allergies   Allergen Reactions     Diagnostic X-Ray Materials Hives     CT DYE     Contrast Dye Hives     CT DYE     Wound Dressing Adhesive      Condition on Discharge:  Stable.  Function:  Walking up to 150 ft RW indep and indep with ADL's  Cognitive Scores: BIMS: 15/15, SBT: 4/28    Equipment: RW    DISCHARGE DIAGNOSIS:   1. Closed fracture of neck of left femur with routine healing, subsequent encounter    2. Physical deconditioning    3. Anemia due to blood loss, acute    4. COPD exacerbation (H)    5. Other cardiomyopathy (H)    6. CKD (chronic kidney disease) stage 3, GFR 30-59 ml/min (H)    7. 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 She also has no  past medical history of Asymptomatic human immunodeficiency virus (HIV) infection status (H); Cerebral palsy (H); Chronic infection; Complication of anesthesia; Congenital renal agenesis and dysgenesis; Goiter; Gout; Hernia, abdominal; History of blood transfusion; History of spinal cord injury; History of thrombophlebitis; Horseshoe kidney; Hydrocephalus; Malignant hyperthermia; Palpitations; Parkinsons disease (H); Sleep apnea; Spider veins; Spina bifida (H); STD (sexually transmitted disease); Tethered cord (H); or Tuberculosis.    DISCHARGE MEDICATIONS:  Current Outpatient Prescriptions   Medication Sig Dispense Refill     Acetaminophen (TYLENOL PO) Take 650 mg by mouth 3 times daily  mg every 6 hours as needed.       albuterol (2.5 MG/3ML) 0.083% neb solution Take 2.5 mg by nebulization 2 times daily       albuterol (PROAIR HFA/PROVENTIL HFA/VENTOLIN HFA) 108 (90 BASE) MCG/ACT Inhaler Inhale 2 puffs into the lungs every 6 hours as needed for wheezing 1 Inhaler 8     aspirin 325 MG EC tablet Take 1 tablet (325 mg) by mouth daily 45 tablet 0     atorvastatin (LIPITOR) 20 MG tablet TAKE ONE TABLET BY MOUTH ONE TIME DAILY  90 tablet 1     budesonide-formoterol (SYMBICORT) 80-4.5 MCG/ACT Inhaler Inhale 2 puffs into the lungs 2 times daily 3 Inhaler 1     denosumab (PROLIA) 60 MG/ML SOLN injection Inject 1 mL (60 mg) Subcutaneous every 6 months 1 mL 0     ferrous sulfate (IRON) 325 (65 FE) MG tablet Take by mouth daily (with breakfast)       furosemide (LASIX) 20 MG tablet TAKE ONE TABLET BY MOUTH ONE TIME DAILY  90 tablet 1     gabapentin (NEURONTIN) 300 MG capsule Take 300 mg by mouth 2 times daily       levothyroxine (SYNTHROID/LEVOTHROID) 75 MCG tablet TAKE ONE TABLET BY MOUTH ONE TIME DAILY  90 tablet 1     metoprolol succinate (TOPROL-XL) 25 MG 24 hr tablet Take 1 tablet (25 mg) by mouth daily 90 tablet 3     Multiple Vitamins-Minerals (MULTIVITAMIN GUMMIES ADULT PO) Take 2 tablets by mouth daily         ondansetron (ZOFRAN-ODT) 4 MG ODT tab Take 1 tablet (4 mg) by mouth every 6 hours as needed for nausea or vomiting 20 tablet 0     order for DME Equipment being ordered: Nebulizer  Fax order to [facility] [phone] 1 each 0     senna-docusate (SENOKOT-S;PERICOLACE) 8.6-50 MG per tablet Take 2 tablets by mouth 2 times daily 40 tablet 0     TRAMADOL HCL PO Take  mg by mouth every 4 hours as needed for moderate to severe pain       traZODone (DESYREL) 50 MG tablet Take 100 mg by mouth At Bedtime         MEDICATION CHANGES/RATIONALE:   Initially patient pain was uncontrolled so tramadol was increased to 50-100mg q 4 hours prn  There were no other medication changes made during TCU stay  Last 3 BPs: 98/58, 103/61, 98/52 mmHg  HR Ranges: 67-92 bpm  Admission Weight: 11/22/18: 119.9 lbs  Current Weights: 11/25/18: 121.2 lbs - 11/29/18: 120 lbs  Controlled medications sent with patient:   Script for tramadol 50g q 6 hours prn medication for 20 tabs and 0 refills given to patient at dischage to have them fill at their out patient pharmacy     ROS:    10 point ROS of systems including Constitutional, Eyes, Respiratory, Cardiovascular, Gastroenterology, Genitourinary, Integumentary, Musculoskeletal, Psychiatric were all negative except for pertinent positives noted in my HPI.    Physical Exam:   Vitals: BP 98/58  Pulse 67  Temp 98.5  F (36.9  C)  Resp 18  Wt 120 lb (54.4 kg)  SpO2 92%  BMI 25.97 kg/m2  BMI= Body mass index is 25.97 kg/(m^2).  GENERAL APPEARANCE:  Alert, in no distress  ENT:  Mouth and posterior oropharynx normal, moist mucous membranes, [place]  EYES:  EOM, conjunctivae, lids, pupils and irises normal, PERRL  RESP:  respiratory effort and palpation of chest normal, lungs clear to auscultation , no respiratory distress, diminished breath sounds bases bilaterally  CV:  Palpation and auscultation of heart done , regular rate and rhythm, no murmur, rub, or gallop, peripheral edema trace+ in  LLE  ABDOMEN:  normal bowel sounds, soft, nontender, no hepatosplenomegaly or other masses  M/S:   Examination of:   right upper extremity, left upper extremity and right lower extremity  Inspection, ROM, stability and muscle strength normal and decreased ROM and strength LLE  SKIN:  Inspection of skin and subcutaneous tissue baseline, did not visualize left hip incision at time of exam  NEURO:   Cranial nerves 2-12 are normal tested and grossly at patient's baseline, speech WNL  PSYCH:  affect and mood normal       HPI Nursing Facility Course: patient progressed in therapy to walking up to 150 feet using a RW indep, indep with ADL's will DC home with home PT and RN through [facility].   HPI information obtained from: facility chart records, facility staff, patient report and [facility] chart review.    Closed fracture of neck of left femur with routine healing, subsequent encounter  Physical deconditioning  Acute/ongoing: DC home with home PT and RN through [facility], continue tylenol 650mg  q 6 hours prn, decrease  tramadol to 50mg q 6 hours prn, ASA 325mg QD  F/u with Dr. Reynolds on 12/3/18     Anemia due to blood loss, acute  Acute: continue ferrous sulfate 325mg QD, f/u with PCP     COPD exacerbation (H)  Ongoing: on O2 patient has a concentrator at home that she uses prn,  continue symbicort 80-4.5mcg/act 2 puffs BID, albuterol neb BID scheduled,  albuterol MDI 2 puffs q 6 hours prn  Home RN for assessment     Other cardiomyopathy (H)  Chronic/ongoing: continue daily weights,  continue lasix 20mg QD, toprol xl 25mg QD     CKD (chronic kidney disease) stage 3, GFR 30-59 ml/min (H)  Ongoing: f/u with PCP     CLL (chronic lymphocytic leukemia) (HCC)  Ongoing: followed by oncology Dr. Ivory    DISCHARGE PLAN:  Physical Therapy, Registered Nurse and From:  [facility]  Patient instructed to follow-up with:  PCP in 5-7 days       Current [place] scheduled appointments:  Future Appointments  Date  Time Provider Department Center   12/10/2018 1:00 PM Gabby Knight MD [facility] referral needed and placed by this provider: No    Pending labs: none  SNF labs   Hemoglobin, Blood (11/26/2018 7:28 AM CST)  Hemoglobin, Blood (11/26/2018 7:28 AM CST)   Component Value Ref Range Performed At Pathologist Signature   Hemoglobin 8.3 (L) 11.8 - 15.5 g/dL PN SOFT         Basic Metabolic Panel (11/26/2018 7:28 AM CST)  Basic Metabolic Panel (11/26/2018 7:28 AM CST)   Component Value Ref Range Performed At Pathologist Signature   Creatinine Serum 0.92 0.55 - 1.02 mg/dL PN SOFT     Lab Glucose 97  Comment:   The stated glucose range is for the fasting state.  Non-fasting glucose range is  mg/dL     70 - 100 mg/dL PN SOFT     CO2 33 (H) 22 - 31 mmol/L PN SOFT     Chloride 99 98 - 109 mmol/L PN SOFT     Potassium 3.9 3.5 - 5.2 mmol/L PN SOFT     Sodium 139 136 - 145 mmol/L PN SOFT     Blood Urea Nitrogen <10 9 - 26 mg/dL PN SOFT     Calcium 8.5 8.4 - 10.4 mg/dL PN SOFT     Est GFR African Am >60 >60 mL/min/1.73m2 PN SOFT     Est GFR Non-Afr Am 56 (L)  Comment:   Normal>60, moderate decrease 30 - 59, severe decrease 15 - 29,  renal failure <15 mL/min/1.73 m2  NOTE:  Choose the eGFR result above appropriate for the race of the patient.     >60 mL/min/1.73m2 PN SOFT        CBC RESULTS:   Recent Labs   Lab Test  11/21/18   0603  11/20/18   0629   WBC  37.4*  52.9*   RBC  2.79*  2.84*   HGB  8.3*  8.3*   HCT  27.5*  27.9*   MCV  99  98   MCH  29.7  29.2   MCHC  30.2*  29.7*   RDW  17.2*  17.2*   PLT  53*  66*       Last Basic Metabolic Panel:  Recent Labs   Lab Test  11/21/18   0603  11/20/18   0629   NA  140  138   POTASSIUM  4.6  5.0   CHLORIDE  107  105   CARLOS  7.4*  7.2*   CO2  34*  33*   BUN  7  11   CR  0.98  1.14*   GLC  87  102*             Discharge Treatments:none    TOTAL DISCHARGE TIME:   Greater than 30 minutes  Electronically signed by:  Tonya Lynn Haase, APRN CNP         Documentation of Face-to-Face and  Certification for Home Health Services     Patient: Marie Kline   YOB: 1932  MR Number: [id number]  Today's Date: 11/30/2018    I certify that patient: Marie Kline is under my care and that I, or a nurse practitioner or physician's assistant working with me, had a face-to-face encounter that meets the physician face-to-face encounter requirements with this patient on: 11/30/2018.    This encounter with the patient was in whole, or in part, for the following medical condition, which is the primary reason for home health care: left femur Fx, COPD.    I certify that, based on my findings, the following services are medically necessary home health services: Nursing and Physical Therapy.    My clinical findings support the need for the above services because: Nurse is needed: To assess vitals, SaO2 and lung assessment, medication monitoring, pain assessment after changes in medications or other medical regimen.. and Physical Therapy Services are needed to assess and treat the following functional impairments: strengthening, endurance, home safety.    Further, I certify that my clinical findings support that this patient is homebound (i.e. absences from home require considerable and taxing effort and are for medical reasons or [employer] services or infrequently or of short duration when for other reasons) because: Requires assistance of another person or specialized equipment to access medical services because patient:  using assistive device for transfer and mobility..    Based on the above findings. I certify that this patient is confined to the home and needs intermittent skilled nursing care, physical therapy and/or speech therapy.  The patient is under my care, and I have initiated the establishment of the plan of care.  This patient will be followed by a physician who will periodically review the plan of care.  Physician/Provider to provide follow up care: Piper Kiser    Responsible Medicare  certified PECOS Physician: Electronically signed by Dr. Red Scott MD, and only signing for initial order. Please send all follow up questions and concerns or needed follow up signatures to the PCP Piper Kiser.    Physician Signature: See electronic signature associated with these discharge orders.  Date: 11/30/2018        Sincerely,        Tonya Lynn Haase, APRN CNP

## 2018-11-30 NOTE — PROGRESS NOTES
[facility] DISCHARGE SUMMARY    PATIENT'S NAME: Marie Kline  YOB: 1932  MEDICAL RECORD NUMBER:  [id number]  Place of Service where encounter took place:  [facility] ([facility] [[id number]]    PRIMARY CARE PROVIDER AND CLINIC RESPONSIBLE AFTER TRANSFER: Piper Kiser [address] [facility] / [facility] [address]     TRANSFERRING PROVIDERS: Tonya Lynn Haase, APRN CNP, Dr. Tyler MD  DATE OF SNF ADMISSION:  November / 22 / 2018  DATE OF SNF (anticipated) DISCHARGE: December / 02 / 2018  DISCHARGE DISPOSITION: [facility] Provider   RECENT HOSPITALIZATION/ED:  [facility] stay 11/18/18 to 11/22/18.     CODE STATUS/ADVANCE DIRECTIVES DISCUSSION:   CPR/Full code      Allergies   Allergen Reactions     Diagnostic X-Ray Materials Hives     CT DYE     Contrast Dye Hives     CT DYE     Wound Dressing Adhesive      Condition on Discharge:  Stable.  Function:  Walking up to 150 ft RW indep and indep with ADL's  Cognitive Scores: BIMS: 15/15, SBT: 4/28    Equipment: RW    DISCHARGE DIAGNOSIS:   1. Closed fracture of neck of left femur with routine healing, subsequent encounter    2. Physical deconditioning    3. Anemia due to blood loss, acute    4. COPD exacerbation (H)    5. Other cardiomyopathy (H)    6. CKD (chronic kidney disease) stage 3, GFR 30-59 ml/min (H)    7. 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 She also has no past medical history of Asymptomatic human immunodeficiency virus (HIV) infection status (H); Cerebral  palsy (H); Chronic infection; Complication of anesthesia; Congenital renal agenesis and dysgenesis; Goiter; Gout; Hernia, abdominal; History of blood transfusion; History of spinal cord injury; History of thrombophlebitis; Horseshoe kidney; Hydrocephalus; Malignant hyperthermia; Palpitations; Parkinsons disease (H); Sleep apnea; Spider veins; Spina bifida (H); STD (sexually transmitted disease); Tethered cord (H); or Tuberculosis.    DISCHARGE MEDICATIONS:  Current Outpatient Prescriptions   Medication Sig Dispense Refill     Acetaminophen (TYLENOL PO) Take 650 mg by mouth 3 times daily  mg every 6 hours as needed.       albuterol (2.5 MG/3ML) 0.083% neb solution Take 2.5 mg by nebulization 2 times daily       albuterol (PROAIR HFA/PROVENTIL HFA/VENTOLIN HFA) 108 (90 BASE) MCG/ACT Inhaler Inhale 2 puffs into the lungs every 6 hours as needed for wheezing 1 Inhaler 8     aspirin 325 MG EC tablet Take 1 tablet (325 mg) by mouth daily 45 tablet 0     atorvastatin (LIPITOR) 20 MG tablet TAKE ONE TABLET BY MOUTH ONE TIME DAILY  90 tablet 1     budesonide-formoterol (SYMBICORT) 80-4.5 MCG/ACT Inhaler Inhale 2 puffs into the lungs 2 times daily 3 Inhaler 1     denosumab (PROLIA) 60 MG/ML SOLN injection Inject 1 mL (60 mg) Subcutaneous every 6 months 1 mL 0     ferrous sulfate (IRON) 325 (65 FE) MG tablet Take by mouth daily (with breakfast)       furosemide (LASIX) 20 MG tablet TAKE ONE TABLET BY MOUTH ONE TIME DAILY  90 tablet 1     gabapentin (NEURONTIN) 300 MG capsule Take 300 mg by mouth 2 times daily       levothyroxine (SYNTHROID/LEVOTHROID) 75 MCG tablet TAKE ONE TABLET BY MOUTH ONE TIME DAILY  90 tablet 1     metoprolol succinate (TOPROL-XL) 25 MG 24 hr tablet Take 1 tablet (25 mg) by mouth daily 90 tablet 3     Multiple Vitamins-Minerals (MULTIVITAMIN GUMMIES ADULT PO) Take 2 tablets by mouth daily        ondansetron (ZOFRAN-ODT) 4 MG ODT tab Take 1 tablet (4 mg) by mouth every 6 hours as needed for nausea  or vomiting 20 tablet 0     order for DME Equipment being ordered: Nebulizer  Fax order to [facility] [phone] 1 each 0     senna-docusate (SENOKOT-S;PERICOLACE) 8.6-50 MG per tablet Take 2 tablets by mouth 2 times daily 40 tablet 0     TRAMADOL HCL PO Take  mg by mouth every 4 hours as needed for moderate to severe pain       traZODone (DESYREL) 50 MG tablet Take 100 mg by mouth At Bedtime         MEDICATION CHANGES/RATIONALE:   Initially patient pain was uncontrolled so tramadol was increased to 50-100mg q 4 hours prn  There were no other medication changes made during TCU stay  Last 3 BPs: 98/58, 103/61, 98/52 mmHg  HR Ranges: 67-92 bpm  Admission Weight: 11/22/18: 119.9 lbs  Current Weights: 11/25/18: 121.2 lbs - 11/29/18: 120 lbs  Controlled medications sent with patient:   Script for tramadol 50g q 6 hours prn medication for 20 tabs and 0 refills given to patient at dischage to have them fill at their out patient pharmacy     ROS:    10 point ROS of systems including Constitutional, Eyes, Respiratory, Cardiovascular, Gastroenterology, Genitourinary, Integumentary, Musculoskeletal, Psychiatric were all negative except for pertinent positives noted in my HPI.    Physical Exam:   Vitals: BP 98/58  Pulse 67  Temp 98.5  F (36.9  C)  Resp 18  Wt 120 lb (54.4 kg)  SpO2 92%  BMI 25.97 kg/m2  BMI= Body mass index is 25.97 kg/(m^2).  GENERAL APPEARANCE:  Alert, in no distress  ENT:  Mouth and posterior oropharynx normal, moist mucous membranes, [place]  EYES:  EOM, conjunctivae, lids, pupils and irises normal, PERRL  RESP:  respiratory effort and palpation of chest normal, lungs clear to auscultation , no respiratory distress, diminished breath sounds bases bilaterally  CV:  Palpation and auscultation of heart done , regular rate and rhythm, no murmur, rub, or gallop, peripheral edema trace+ in LLE  ABDOMEN:  normal bowel sounds, soft, nontender, no hepatosplenomegaly or other masses  M/S:    Examination of:   right upper extremity, left upper extremity and right lower extremity  Inspection, ROM, stability and muscle strength normal and decreased ROM and strength LLE  SKIN:  Inspection of skin and subcutaneous tissue baseline, did not visualize left hip incision at time of exam  NEURO:   Cranial nerves 2-12 are normal tested and grossly at patient's baseline, speech WNL  PSYCH:  affect and mood normal       HPI Nursing Facility Course: patient progressed in therapy to walking up to 150 feet using a RW indep, indep with ADL's will DC home with home PT and RN through [facility].   HPI information obtained from: facility chart records, facility staff, patient report and [facility] chart review.    Closed fracture of neck of left femur with routine healing, subsequent encounter  Physical deconditioning  Acute/ongoing: DC home with home PT and RN through [facility], continue tylenol 650mg  q 6 hours prn, decrease  tramadol to 50mg q 6 hours prn, ASA 325mg QD  F/u with Dr. Reynolds on 12/3/18     Anemia due to blood loss, acute  Acute: continue ferrous sulfate 325mg QD, f/u with PCP     COPD exacerbation (H)  Ongoing: on O2 patient has a concentrator at home that she uses prn,  continue symbicort 80-4.5mcg/act 2 puffs BID, albuterol neb BID scheduled,  albuterol MDI 2 puffs q 6 hours prn  Home RN for assessment     Other cardiomyopathy (H)  Chronic/ongoing: continue daily weights,  continue lasix 20mg QD, toprol xl 25mg QD     CKD (chronic kidney disease) stage 3, GFR 30-59 ml/min (H)  Ongoing: f/u with PCP     CLL (chronic lymphocytic leukemia) (HCC)  Ongoing: followed by oncology Dr. Ivory    DISCHARGE PLAN:  Physical Therapy, Registered Nurse and From:  [facility]  Patient instructed to follow-up with:  PCP in 5-7 days       Current [place] scheduled appointments:  Future Appointments  Date Time Provider Department Center   12/10/2018 1:00 PM Gabby Knight MD [facility] referral  needed and placed by this provider: No    Pending labs: none  SNF labs   Hemoglobin, Blood (11/26/2018 7:28 AM CST)  Hemoglobin, Blood (11/26/2018 7:28 AM CST)   Component Value Ref Range Performed At Pathologist Signature   Hemoglobin 8.3 (L) 11.8 - 15.5 g/dL PN SOFT         Basic Metabolic Panel (11/26/2018 7:28 AM CST)  Basic Metabolic Panel (11/26/2018 7:28 AM CST)   Component Value Ref Range Performed At Pathologist Signature   Creatinine Serum 0.92 0.55 - 1.02 mg/dL PN SOFT     Lab Glucose 97  Comment:   The stated glucose range is for the fasting state.  Non-fasting glucose range is  mg/dL     70 - 100 mg/dL PN SOFT     CO2 33 (H) 22 - 31 mmol/L PN SOFT     Chloride 99 98 - 109 mmol/L PN SOFT     Potassium 3.9 3.5 - 5.2 mmol/L PN SOFT     Sodium 139 136 - 145 mmol/L PN SOFT     Blood Urea Nitrogen <10 9 - 26 mg/dL PN SOFT     Calcium 8.5 8.4 - 10.4 mg/dL PN SOFT     Est GFR African Am >60 >60 mL/min/1.73m2 PN SOFT     Est GFR Non-Afr Am 56 (L)  Comment:   Normal>60, moderate decrease 30 - 59, severe decrease 15 - 29,  renal failure <15 mL/min/1.73 m2  NOTE:  Choose the eGFR result above appropriate for the race of the patient.     >60 mL/min/1.73m2 PN SOFT        CBC RESULTS:   Recent Labs   Lab Test  11/21/18   0603  11/20/18   0629   WBC  37.4*  52.9*   RBC  2.79*  2.84*   HGB  8.3*  8.3*   HCT  27.5*  27.9*   MCV  99  98   MCH  29.7  29.2   MCHC  30.2*  29.7*   RDW  17.2*  17.2*   PLT  53*  66*       Last Basic Metabolic Panel:  Recent Labs   Lab Test  11/21/18   0603  11/20/18   0629   NA  140  138   POTASSIUM  4.6  5.0   CHLORIDE  107  105   CARLOS  7.4*  7.2*   CO2  34*  33*   BUN  7  11   CR  0.98  1.14*   GLC  87  102*             Discharge Treatments:none    TOTAL DISCHARGE TIME:   Greater than 30 minutes  Electronically signed by:  Virginia Mirian Haase, APRN CNP         Documentation of Face-to-Face and Certification for Home Health Services     Patient: Marie Kline   YOB: 1932  MR  Number: [id number]  Today's Date: 11/30/2018    I certify that patient: Marie Kline is under my care and that I, or a nurse practitioner or physician's assistant working with me, had a face-to-face encounter that meets the physician face-to-face encounter requirements with this patient on: 11/30/2018.    This encounter with the patient was in whole, or in part, for the following medical condition, which is the primary reason for home health care: left femur Fx, COPD.    I certify that, based on my findings, the following services are medically necessary home health services: Nursing and Physical Therapy.    My clinical findings support the need for the above services because: Nurse is needed: To assess vitals, SaO2 and lung assessment, medication monitoring, pain assessment after changes in medications or other medical regimen.. and Physical Therapy Services are needed to assess and treat the following functional impairments: strengthening, endurance, home safety.    Further, I certify that my clinical findings support that this patient is homebound (i.e. absences from home require considerable and taxing effort and are for medical reasons or Muslim services or infrequently or of short duration when for other reasons) because: Requires assistance of another person or specialized equipment to access medical services because patient:  using assistive device for transfer and mobility..    Based on the above findings. I certify that this patient is confined to the home and needs intermittent skilled nursing care, physical therapy and/or speech therapy.  The patient is under my care, and I have initiated the establishment of the plan of care.  This patient will be followed by a physician who will periodically review the plan of care.  Physician/Provider to provide follow up care: Piper Kiser    Responsible Medicare certified PECOS Physician: Electronically signed by Dr. Red Scott MD, and only signing for  initial order. Please send all follow up questions and concerns or needed follow up signatures to the PCP Piper Kiser.    Physician Signature: See electronic signature associated with these discharge orders.  Date: 11/30/2018

## 2018-12-03 ENCOUNTER — PATIENT OUTREACH (OUTPATIENT)
Dept: INTERNAL MEDICINE | Facility: CLINIC | Age: 83
End: 2018-12-03

## 2018-12-03 ENCOUNTER — TRANSFERRED RECORDS (OUTPATIENT)
Dept: HEALTH INFORMATION MANAGEMENT | Facility: CLINIC | Age: 83
End: 2018-12-03

## 2018-12-03 ENCOUNTER — TELEPHONE (OUTPATIENT)
Dept: INTERNAL MEDICINE | Facility: CLINIC | Age: 83
End: 2018-12-03

## 2018-12-03 NOTE — TELEPHONE ENCOUNTER
IP F/U    Date: 12/02/18  Diagnosis: Closed Fracture of Neck of Left Femur with Routine Healing, Subsequent Encounter  Is patient active in care coordination? No  Was patient in TCU? Yes - Route to Care Coordination (P 82235)    Next 5 appointments (look out 90 days)     Dec 10, 2018  1:00 PM CST   SHORT with Gabby Knight MD   [facility]    [address] [facility] [id number] [phone]

## 2018-12-03 NOTE — PROGRESS NOTES
Clinic Care Coordination Contact  Care Coordination Communication    Referral Source: SNF/TCU    Clinical Data:   DATE OF SNF ADMISSION: [facility]  November / 22 / 2018  DATE OF SNF (anticipated) DISCHARGE: December / 02 / 2018  DISCHARGE DISPOSITION: [facility] Provider   RECENT HOSPITALIZATION/ED:  [facility] stay 11/18/18 to 11/22/18.      Dx:  Left femoral neck fracture secondary to mechanical fall.  S/P ORIF on 11/19/18.    Home Care Contact:              Home Care Agency: [facility]            Contact name () and phone number: [phone]              Care Coordination contacted home care: No- intake person, Irish had just arrived when writer was speaking to patient.              Anticipated start of care date: 12/3/18    Patient Contact:               Introduced self and role of care coordination.               Discharge instructions were reviewed with patient/caregiver.               Do you have any questions about your medications? no              Follow up appointment is scheduled for yes, 12/10 @ 1300.              Provided 24 Hour Nurse Line and/or 24 Hour Appointment Scheduling: Yes              Home care has contacted patient: Yes              Patient questions/concerns: None at this time.    Plan: RN/SW Care Coordinator will await notification from home care staff informing RN/SW Care Coordinator of patients discharge plans/needs. RN/SW Care Coordinator will review chart and outreach to home care every 4 weeks and as needed.      Babs Garcia RN-BSN   Care Coordination  Phone:  [phone]  Email: [email or web address]

## 2018-12-07 ENCOUNTER — HOSPITAL ENCOUNTER (INPATIENT)
Facility: CLINIC | Age: 83
LOS: 4 days | Discharge: HOME | DRG: 286 | End: 2018-12-12
Attending: EMERGENCY MEDICINE | Admitting: INTERNAL MEDICINE
Payer: MEDICARE

## 2018-12-07 DIAGNOSIS — I21.4 NSTEMI (NON-ST ELEVATED MYOCARDIAL INFARCTION) (H): ICD-10-CM

## 2018-12-07 DIAGNOSIS — J96.22 ACUTE AND CHRONIC RESPIRATORY FAILURE WITH HYPERCAPNIA (H): ICD-10-CM

## 2018-12-07 DIAGNOSIS — C91.10 CLL (CHRONIC LYMPHOCYTIC LEUKEMIA) (H): ICD-10-CM

## 2018-12-07 DIAGNOSIS — I51.81 STRESS-INDUCED CARDIOMYOPATHY: Primary | ICD-10-CM

## 2018-12-07 LAB
ANION GAP SERPL CALCULATED.3IONS-SCNC: 5 MMOL/L (ref 3–14)
BUN SERPL-MCNC: 13 MG/DL (ref 7–30)
CALCIUM SERPL-MCNC: 8.7 MG/DL (ref 8.5–10.1)
CHLORIDE SERPL-SCNC: 99 MMOL/L (ref 94–109)
CO2 SERPL-SCNC: 32 MMOL/L (ref 20–32)
CREAT SERPL-MCNC: 0.82 MG/DL (ref 0.52–1.04)
D DIMER PPP FEU-MCNC: 1.9 UG/ML FEU (ref 0–0.5)
GFR SERPL CREATININE-BSD FRML MDRD: 66 ML/MIN/1.7M2
GLUCOSE SERPL-MCNC: 124 MG/DL (ref 70–99)
NT-PROBNP SERPL-MCNC: 9813 PG/ML (ref 0–1800)
POTASSIUM SERPL-SCNC: 4.5 MMOL/L (ref 3.4–5.3)
SODIUM SERPL-SCNC: 136 MMOL/L (ref 133–144)
TROPONIN I SERPL-MCNC: 0.55 UG/L (ref 0–0.04)

## 2018-12-07 PROCEDURE — 85025 COMPLETE CBC W/AUTO DIFF WBC: CPT | Performed by: EMERGENCY MEDICINE

## 2018-12-07 PROCEDURE — 93005 ELECTROCARDIOGRAM TRACING: CPT

## 2018-12-07 PROCEDURE — 83880 ASSAY OF NATRIURETIC PEPTIDE: CPT | Performed by: EMERGENCY MEDICINE

## 2018-12-07 PROCEDURE — 84484 ASSAY OF TROPONIN QUANT: CPT | Performed by: EMERGENCY MEDICINE

## 2018-12-07 PROCEDURE — 85379 FIBRIN DEGRADATION QUANT: CPT | Performed by: EMERGENCY MEDICINE

## 2018-12-07 PROCEDURE — 80048 BASIC METABOLIC PNL TOTAL CA: CPT | Performed by: EMERGENCY MEDICINE

## 2018-12-07 PROCEDURE — 99285 EMERGENCY DEPT VISIT HI MDM: CPT | Mod: 25

## 2018-12-07 NOTE — LETTER
Key Recommendations:  Pt admitted for NSTEMI w/ EF 30-35%. Met w/ pt at bedside to discuss CHF. Pt reports that she does not follow a special diet, reviewed low sodium diet. She does not add salt while cooking but does occasional add salt to her meals, discussed alternatives to salt. She has a scale and does check her weights daily, she keeps a log of them. Reviewed CHF action plan and signs/symptoms to watch for and when to call provider, pt is receptive and understanding of this information.      [facility] she discharge to home w/ home care on 12/2.     Pt is open to [place] RN/PT/OT/HHA - Plan on discharge ***    Pt would benefit from clinic CC follow-up and reinforcement of this education.     Tahmina Larios    AVS/Discharge Summary is the source of truth; this is a helpful guide for improved communication of patient story

## 2018-12-07 NOTE — Clinical Note
R-Band utilized for closure of sight.  Manual pressure applied by scrub person; hemostasis achieved.

## 2018-12-07 NOTE — Clinical Note
Upon prepping the patients groin, a rash appears to be present on both sides of patient along with buttock and upper leg. Dr. Carrasco notified and is going to assess patient before proceeding.

## 2018-12-07 NOTE — LETTER
Transition Communication Hand-off for Care Transitions to Next Level of Care Provider    Hand-off for Care Transitions to Next Level of Care Provider  Name: Marie Kline  : 1932  MRN #: [id number]  Reason for Hospitalization:  NSTEMI (non-ST elevated myocardial infarction) (H)  Admit Date/Time: 2018 10:20 PM  Discharge Date: 2018    Reason for Communication Hand-off Referral: Other  Stress Cardiomyopathy    Discharge Plan:  Discharged to: Home with homecare                   Patient agreeable to post-hospital support suggestions:  Yes    Patient is on new medications:   No    MTM follow up recommended: Yes    Tel-Assurance program:  Already enrolled in a TA program    Patient will receive  HOME CARE  at:  discharge through [facility].     Concern for non-adherence with plan of care:  No    Follow-up specialty is recommended: Yes. Follow up with Telemedicine for MTM referral; also, f/u with [facility] as scheduled.     Follow-up plan:    Future Appointments   Date Time Provider Department Center   2018  1:00 PM [facility] Cast[facility]   2019 12:30 PM [id number]   2019  1:10 PM Tomi Hurd, [facility]     Any outstanding tests or procedures:  No. Pt has an Echocardiogram scheduled for 19 at 12:30pm.       Radiology & Cardiology Orders     Future Labs/Procedures Complete By Expires    Echocardiogram Limited  2019 (Approximate) 2019    Process Instructions:    Administration of IV contrast will be tailored to this examination per the appropriate written protocol listed in the Echocardiography department Protocol Book, or by the supervising Cardiologist. This may result in an order change.    Use of contrast is at the discretion of the supervising Cardiologist.    Comments:    Administration of IV contrast will be tailored to this examination per the appropriate written protocol listed in the Echocardiography department  Protocol Book, or by the supervising Cardiologist. This may result in an order change.    Use of contrast is at the discretion of the supervising Cardiologist.        Referrals     Future Labs/Procedures    Follow-Up with Cardiac Advanced Practice Provider     Home care nursing referral     Comments:    Resume RN and HHA as prior to admission    Your provider has ordered home care nursing services. If you have not been contacted within 2 days of your discharge please call the inpatient department phone number at [phone] .    Home Care OT Referral for Hospital Discharge     Comments:    Resume OT to eval and treat as PTA    Your provider has ordered home care - occupational therapy. If you have not been contacted within 2 days of your discharge please call the department phone number listed on the top of this document.    Home Care PT Referral for Hospital Discharge     Comments:    Resume PT to eval and treat as PTA    Your provider has ordered home care - physical therapy. If you have not been contacted within 2 days of your discharge please call the department phone number listed on the top of this document.    Medication Therapy Management Referral     Comments:    MTM referral reason            Patient had a hospital or ED visit in last 6 months and has more than 10   PTA or Discharge medications    Patient has 5 PTA or Discharge Medications AND one of the following   diagnoses: DM,HF,COPD,AMI DX,PULM HTN       This service is designed to help you get the most from your medications.  A specially trained pharmacist will work closely with you and your doctors  to solve any problems related to your medications and to help you get the   best results from taking them.      The Medication Therapy Management staff will call you to schedule an appointment.          Key Recommendations:  Pt admitted for NSTEMI w/ EF 30-35%. Met w/ pt at bedside to discuss CHF. Pt reports that she does not follow a special diet,  reviewed low sodium diet. She does not add salt while cooking but does occasional add salt to her meals, discussed alternatives to salt. She has a scale and does check her weights daily, she keeps a log of them. Reviewed CHF action plan and signs/symptoms to watch for and when to call provider, pt is receptive and understanding of this information.      Pt recently fractured her hip and was at [facility], she discharged to home w/ home care on 12/2.     Pt is open to [facility]; RN/PT/OT is planned for discharge.     Pt would benefit from clinic CC follow-up and reinforcement of this education.     Tahmina Larios    Communicated handoff via EPIC Comm Mgt to Dr. Piper iKser's CC at [facility].      Sent by Cassie Casey RN, BSN, CTS  [facility]  [phone]    AVS/Discharge Summary is the source of truth; this is a helpful guide for improved communication of patient story

## 2018-12-08 ENCOUNTER — APPOINTMENT (OUTPATIENT)
Dept: CARDIOLOGY | Facility: CLINIC | Age: 83
DRG: 286 | End: 2018-12-08
Attending: INTERNAL MEDICINE
Payer: MEDICARE

## 2018-12-08 ENCOUNTER — APPOINTMENT (OUTPATIENT)
Dept: CT IMAGING | Facility: CLINIC | Age: 83
DRG: 286 | End: 2018-12-08
Attending: EMERGENCY MEDICINE
Payer: MEDICARE

## 2018-12-08 ENCOUNTER — APPOINTMENT (OUTPATIENT)
Dept: ULTRASOUND IMAGING | Facility: CLINIC | Age: 83
DRG: 286 | End: 2018-12-08
Attending: INTERNAL MEDICINE
Payer: MEDICARE

## 2018-12-08 PROBLEM — I21.4 NSTEMI (NON-ST ELEVATED MYOCARDIAL INFARCTION) (H): Status: ACTIVE | Noted: 2018-12-08

## 2018-12-08 LAB
ALBUMIN UR-MCNC: NEGATIVE MG/DL
ANISOCYTOSIS BLD QL SMEAR: SLIGHT
APPEARANCE UR: CLEAR
BASOPHILS # BLD AUTO: 0 10E9/L (ref 0–0.2)
BASOPHILS NFR BLD AUTO: 0 %
BILIRUB UR QL STRIP: NEGATIVE
COLOR UR AUTO: YELLOW
DIFFERENTIAL METHOD BLD: ABNORMAL
EOSINOPHIL # BLD AUTO: 0.8 10E9/L (ref 0–0.7)
EOSINOPHIL NFR BLD AUTO: 1 %
ERYTHROCYTE [DISTWIDTH] IN BLOOD BY AUTOMATED COUNT: 17.4 % (ref 10–15)
ERYTHROCYTE [DISTWIDTH] IN BLOOD BY AUTOMATED COUNT: 17.5 % (ref 10–15)
GLUCOSE BLDC GLUCOMTR-MCNC: 131 MG/DL (ref 70–99)
GLUCOSE BLDC GLUCOMTR-MCNC: 162 MG/DL (ref 70–99)
GLUCOSE UR STRIP-MCNC: NEGATIVE MG/DL
HCT VFR BLD AUTO: 32.8 % (ref 35–47)
HCT VFR BLD AUTO: 33.3 % (ref 35–47)
HGB BLD-MCNC: 9.9 G/DL (ref 11.7–15.7)
HGB BLD-MCNC: 9.9 G/DL (ref 11.7–15.7)
HGB UR QL STRIP: NEGATIVE
HYPOCHROMIA BLD QL: PRESENT
INTERPRETATION ECG - MUSE: NORMAL
KETONES UR STRIP-MCNC: 5 MG/DL
LEUKOCYTE ESTERASE UR QL STRIP: NEGATIVE
LMWH PPP CHRO-ACNC: 0.11 IU/ML
LMWH PPP CHRO-ACNC: 0.12 IU/ML
LYMPHOCYTES # BLD AUTO: 71.9 10E9/L (ref 0.8–5.3)
LYMPHOCYTES NFR BLD AUTO: 91 %
MAGNESIUM SERPL-MCNC: 2.4 MG/DL (ref 1.6–2.3)
MCH RBC QN AUTO: 29.4 PG (ref 26.5–33)
MCH RBC QN AUTO: 29.6 PG (ref 26.5–33)
MCHC RBC AUTO-ENTMCNC: 29.7 G/DL (ref 31.5–36.5)
MCHC RBC AUTO-ENTMCNC: 30.2 G/DL (ref 31.5–36.5)
MCV RBC AUTO: 98 FL (ref 78–100)
MCV RBC AUTO: 99 FL (ref 78–100)
MICROCYTES BLD QL SMEAR: PRESENT
MONOCYTES # BLD AUTO: 0.8 10E9/L (ref 0–1.3)
MONOCYTES NFR BLD AUTO: 1 %
NEUTROPHILS # BLD AUTO: 5.5 10E9/L (ref 1.6–8.3)
NEUTROPHILS NFR BLD AUTO: 7 %
NITRATE UR QL: NEGATIVE
PH UR STRIP: 7 PH (ref 5–7)
PLATELET # BLD AUTO: 235 10E9/L (ref 150–450)
PLATELET # BLD AUTO: 264 10E9/L (ref 150–450)
PLATELET # BLD EST: ABNORMAL 10*3/UL
RBC # BLD AUTO: 3.35 10E12/L (ref 3.8–5.2)
RBC # BLD AUTO: 3.37 10E12/L (ref 3.8–5.2)
RBC #/AREA URNS AUTO: 1 /HPF (ref 0–2)
SOURCE: ABNORMAL
SP GR UR STRIP: 1.03 (ref 1–1.03)
SQUAMOUS #/AREA URNS AUTO: <1 /HPF (ref 0–1)
TROPONIN I SERPL-MCNC: 0.58 UG/L (ref 0–0.04)
TROPONIN I SERPL-MCNC: 0.74 UG/L (ref 0–0.04)
UROBILINOGEN UR STRIP-MCNC: 0 MG/DL (ref 0–2)
WBC # BLD AUTO: 79 10E9/L (ref 4–11)
WBC # BLD AUTO: 81.8 10E9/L (ref 4–11)
WBC #/AREA URNS AUTO: 2 /HPF (ref 0–5)

## 2018-12-08 PROCEDURE — 25000132 ZZH RX MED GY IP 250 OP 250 PS 637: Mod: GY | Performed by: INTERNAL MEDICINE

## 2018-12-08 PROCEDURE — 94640 AIRWAY INHALATION TREATMENT: CPT

## 2018-12-08 PROCEDURE — 00000146 ZZHCL STATISTIC GLUCOSE BY METER IP

## 2018-12-08 PROCEDURE — 81001 URINALYSIS AUTO W/SCOPE: CPT | Performed by: INTERNAL MEDICINE

## 2018-12-08 PROCEDURE — 93005 ELECTROCARDIOGRAM TRACING: CPT

## 2018-12-08 PROCEDURE — 96365 THER/PROPH/DIAG IV INF INIT: CPT | Mod: 59

## 2018-12-08 PROCEDURE — 36415 COLL VENOUS BLD VENIPUNCTURE: CPT | Performed by: INTERNAL MEDICINE

## 2018-12-08 PROCEDURE — 84484 ASSAY OF TROPONIN QUANT: CPT | Performed by: INTERNAL MEDICINE

## 2018-12-08 PROCEDURE — 83605 ASSAY OF LACTIC ACID: CPT | Performed by: INTERNAL MEDICINE

## 2018-12-08 PROCEDURE — 85520 HEPARIN ASSAY: CPT | Performed by: EMERGENCY MEDICINE

## 2018-12-08 PROCEDURE — 25000128 H RX IP 250 OP 636: Performed by: EMERGENCY MEDICINE

## 2018-12-08 PROCEDURE — A9270 NON-COVERED ITEM OR SERVICE: HCPCS | Mod: GY | Performed by: INTERNAL MEDICINE

## 2018-12-08 PROCEDURE — 99223 1ST HOSP IP/OBS HIGH 75: CPT | Mod: AI | Performed by: INTERNAL MEDICINE

## 2018-12-08 PROCEDURE — 93306 TTE W/DOPPLER COMPLETE: CPT | Mod: 26 | Performed by: INTERNAL MEDICINE

## 2018-12-08 PROCEDURE — 99232 SBSQ HOSP IP/OBS MODERATE 35: CPT | Performed by: INTERNAL MEDICINE

## 2018-12-08 PROCEDURE — 25000125 ZZHC RX 250: Performed by: INTERNAL MEDICINE

## 2018-12-08 PROCEDURE — 36415 COLL VENOUS BLD VENIPUNCTURE: CPT | Performed by: EMERGENCY MEDICINE

## 2018-12-08 PROCEDURE — 93971 EXTREMITY STUDY: CPT | Mod: LT

## 2018-12-08 PROCEDURE — 71260 CT THORAX DX C+: CPT

## 2018-12-08 PROCEDURE — 99222 1ST HOSP IP/OBS MODERATE 55: CPT | Mod: 25 | Performed by: INTERNAL MEDICINE

## 2018-12-08 PROCEDURE — 85520 HEPARIN ASSAY: CPT | Performed by: INTERNAL MEDICINE

## 2018-12-08 PROCEDURE — 40000275 ZZH STATISTIC RCP TIME EA 10 MIN

## 2018-12-08 PROCEDURE — 85027 COMPLETE CBC AUTOMATED: CPT | Performed by: INTERNAL MEDICINE

## 2018-12-08 PROCEDURE — 25000128 H RX IP 250 OP 636: Performed by: INTERNAL MEDICINE

## 2018-12-08 PROCEDURE — 12000007 ZZH R&B INTERMEDIATE

## 2018-12-08 PROCEDURE — 93306 TTE W/DOPPLER COMPLETE: CPT

## 2018-12-08 PROCEDURE — 94640 AIRWAY INHALATION TREATMENT: CPT | Mod: 76

## 2018-12-08 PROCEDURE — 96375 TX/PRO/DX INJ NEW DRUG ADDON: CPT

## 2018-12-08 PROCEDURE — 83735 ASSAY OF MAGNESIUM: CPT | Performed by: INTERNAL MEDICINE

## 2018-12-08 RX ORDER — ONDANSETRON 2 MG/ML
4 INJECTION INTRAMUSCULAR; INTRAVENOUS EVERY 6 HOURS PRN
Status: DISCONTINUED | OUTPATIENT
Start: 2018-12-08 | End: 2018-12-12 | Stop reason: HOSPADM

## 2018-12-08 RX ORDER — PROCHLORPERAZINE MALEATE 5 MG
5 TABLET ORAL EVERY 6 HOURS PRN
Status: DISCONTINUED | OUTPATIENT
Start: 2018-12-08 | End: 2018-12-12 | Stop reason: HOSPADM

## 2018-12-08 RX ORDER — AMOXICILLIN 250 MG
2 CAPSULE ORAL 2 TIMES DAILY PRN
Status: DISCONTINUED | OUTPATIENT
Start: 2018-12-08 | End: 2018-12-12 | Stop reason: HOSPADM

## 2018-12-08 RX ORDER — LEVOTHYROXINE SODIUM 75 UG/1
75 TABLET ORAL DAILY
Status: DISCONTINUED | OUTPATIENT
Start: 2018-12-08 | End: 2018-12-10

## 2018-12-08 RX ORDER — IOPAMIDOL 755 MG/ML
500 INJECTION, SOLUTION INTRAVASCULAR ONCE
Status: COMPLETED | OUTPATIENT
Start: 2018-12-08 | End: 2018-12-08

## 2018-12-08 RX ORDER — AMOXICILLIN 250 MG
2 CAPSULE ORAL 2 TIMES DAILY
Status: DISCONTINUED | OUTPATIENT
Start: 2018-12-08 | End: 2018-12-12 | Stop reason: HOSPADM

## 2018-12-08 RX ORDER — ACETAMINOPHEN 325 MG/1
650 TABLET ORAL EVERY 4 HOURS PRN
Status: DISCONTINUED | OUTPATIENT
Start: 2018-12-08 | End: 2018-12-11

## 2018-12-08 RX ORDER — NALOXONE HYDROCHLORIDE 0.4 MG/ML
.1-.4 INJECTION, SOLUTION INTRAMUSCULAR; INTRAVENOUS; SUBCUTANEOUS
Status: DISCONTINUED | OUTPATIENT
Start: 2018-12-08 | End: 2018-12-12 | Stop reason: HOSPADM

## 2018-12-08 RX ORDER — POTASSIUM CHLORIDE 29.8 MG/ML
20 INJECTION INTRAVENOUS
Status: DISCONTINUED | OUTPATIENT
Start: 2018-12-08 | End: 2018-12-08 | Stop reason: CLARIF

## 2018-12-08 RX ORDER — DIPHENHYDRAMINE HYDROCHLORIDE 50 MG/ML
25 INJECTION INTRAMUSCULAR; INTRAVENOUS ONCE
Status: COMPLETED | OUTPATIENT
Start: 2018-12-08 | End: 2018-12-08

## 2018-12-08 RX ORDER — POTASSIUM CL/LIDO/0.9 % NACL 10MEQ/0.1L
10 INTRAVENOUS SOLUTION, PIGGYBACK (ML) INTRAVENOUS
Status: DISCONTINUED | OUTPATIENT
Start: 2018-12-08 | End: 2018-12-12 | Stop reason: HOSPADM

## 2018-12-08 RX ORDER — POTASSIUM CHLORIDE 1500 MG/1
20-40 TABLET, EXTENDED RELEASE ORAL
Status: DISCONTINUED | OUTPATIENT
Start: 2018-12-08 | End: 2018-12-12 | Stop reason: HOSPADM

## 2018-12-08 RX ORDER — ACETAMINOPHEN 325 MG/1
650 TABLET ORAL 3 TIMES DAILY
Status: DISCONTINUED | OUTPATIENT
Start: 2018-12-08 | End: 2018-12-12 | Stop reason: HOSPADM

## 2018-12-08 RX ORDER — GABAPENTIN 300 MG/1
300 CAPSULE ORAL 2 TIMES DAILY
Status: DISCONTINUED | OUTPATIENT
Start: 2018-12-08 | End: 2018-12-12 | Stop reason: HOSPADM

## 2018-12-08 RX ORDER — METHYLPREDNISOLONE SODIUM SUCCINATE 125 MG/2ML
125 INJECTION, POWDER, LYOPHILIZED, FOR SOLUTION INTRAMUSCULAR; INTRAVENOUS ONCE
Status: COMPLETED | OUTPATIENT
Start: 2018-12-08 | End: 2018-12-08

## 2018-12-08 RX ORDER — TRAZODONE HYDROCHLORIDE 100 MG/1
100 TABLET ORAL AT BEDTIME
Status: DISCONTINUED | OUTPATIENT
Start: 2018-12-08 | End: 2018-12-12 | Stop reason: HOSPADM

## 2018-12-08 RX ORDER — ALBUTEROL SULFATE 90 UG/1
2 AEROSOL, METERED RESPIRATORY (INHALATION) EVERY 6 HOURS PRN
Status: DISCONTINUED | OUTPATIENT
Start: 2018-12-08 | End: 2018-12-12 | Stop reason: HOSPADM

## 2018-12-08 RX ORDER — ALBUTEROL SULFATE 0.83 MG/ML
2.5 SOLUTION RESPIRATORY (INHALATION) 2 TIMES DAILY
Status: DISCONTINUED | OUTPATIENT
Start: 2018-12-08 | End: 2018-12-12 | Stop reason: HOSPADM

## 2018-12-08 RX ORDER — METOPROLOL SUCCINATE 25 MG/1
25 TABLET, EXTENDED RELEASE ORAL DAILY
Status: DISCONTINUED | OUTPATIENT
Start: 2018-12-08 | End: 2018-12-12 | Stop reason: HOSPADM

## 2018-12-08 RX ORDER — POTASSIUM CHLORIDE 7.45 MG/ML
10 INJECTION INTRAVENOUS
Status: DISCONTINUED | OUTPATIENT
Start: 2018-12-08 | End: 2018-12-12 | Stop reason: HOSPADM

## 2018-12-08 RX ORDER — TRAMADOL HYDROCHLORIDE 50 MG/1
50 TABLET ORAL EVERY 6 HOURS PRN
Status: DISCONTINUED | OUTPATIENT
Start: 2018-12-08 | End: 2018-12-08 | Stop reason: ALTCHOICE

## 2018-12-08 RX ORDER — AMOXICILLIN 250 MG
1 CAPSULE ORAL 2 TIMES DAILY PRN
Status: DISCONTINUED | OUTPATIENT
Start: 2018-12-08 | End: 2018-12-12 | Stop reason: HOSPADM

## 2018-12-08 RX ORDER — FUROSEMIDE 10 MG/ML
40 INJECTION INTRAMUSCULAR; INTRAVENOUS EVERY 12 HOURS
Status: DISCONTINUED | OUTPATIENT
Start: 2018-12-08 | End: 2018-12-09

## 2018-12-08 RX ORDER — ATORVASTATIN CALCIUM 20 MG/1
20 TABLET, FILM COATED ORAL DAILY
Status: DISCONTINUED | OUTPATIENT
Start: 2018-12-08 | End: 2018-12-12 | Stop reason: HOSPADM

## 2018-12-08 RX ORDER — PROCHLORPERAZINE 25 MG
12.5 SUPPOSITORY, RECTAL RECTAL EVERY 12 HOURS PRN
Status: DISCONTINUED | OUTPATIENT
Start: 2018-12-08 | End: 2018-12-12 | Stop reason: HOSPADM

## 2018-12-08 RX ORDER — MAGNESIUM SULFATE HEPTAHYDRATE 40 MG/ML
4 INJECTION, SOLUTION INTRAVENOUS EVERY 4 HOURS PRN
Status: DISCONTINUED | OUTPATIENT
Start: 2018-12-08 | End: 2018-12-12 | Stop reason: HOSPADM

## 2018-12-08 RX ORDER — ONDANSETRON 4 MG/1
4 TABLET, ORALLY DISINTEGRATING ORAL EVERY 6 HOURS PRN
Status: DISCONTINUED | OUTPATIENT
Start: 2018-12-08 | End: 2018-12-12 | Stop reason: HOSPADM

## 2018-12-08 RX ORDER — POLYETHYLENE GLYCOL 3350 17 G/17G
17 POWDER, FOR SOLUTION ORAL DAILY PRN
Status: DISCONTINUED | OUTPATIENT
Start: 2018-12-08 | End: 2018-12-12 | Stop reason: HOSPADM

## 2018-12-08 RX ORDER — FERROUS SULFATE 325(65) MG
325 TABLET ORAL DAILY
Status: DISCONTINUED | OUTPATIENT
Start: 2018-12-08 | End: 2018-12-12 | Stop reason: HOSPADM

## 2018-12-08 RX ORDER — POTASSIUM CHLORIDE 1.5 G/1.58G
20-40 POWDER, FOR SOLUTION ORAL
Status: DISCONTINUED | OUTPATIENT
Start: 2018-12-08 | End: 2018-12-12 | Stop reason: HOSPADM

## 2018-12-08 RX ADMIN — SODIUM CHLORIDE 71 ML: 9 INJECTION, SOLUTION INTRAVENOUS at 00:53

## 2018-12-08 RX ADMIN — DIPHENHYDRAMINE HYDROCHLORIDE 25 MG: 50 INJECTION INTRAMUSCULAR; INTRAVENOUS at 00:12

## 2018-12-08 RX ADMIN — FUROSEMIDE 40 MG: 10 INJECTION, SOLUTION INTRAMUSCULAR; INTRAVENOUS at 19:43

## 2018-12-08 RX ADMIN — ALBUTEROL SULFATE 2.5 MG: 2.5 SOLUTION RESPIRATORY (INHALATION) at 20:41

## 2018-12-08 RX ADMIN — DEXTROSE AND SODIUM CHLORIDE: 5; 900 INJECTION, SOLUTION INTRAVENOUS at 02:23

## 2018-12-08 RX ADMIN — TRAZODONE HYDROCHLORIDE 100 MG: 100 TABLET ORAL at 21:16

## 2018-12-08 RX ADMIN — Medication 1400 UNITS: at 17:15

## 2018-12-08 RX ADMIN — LEVOTHYROXINE SODIUM 75 MCG: 75 TABLET ORAL at 11:33

## 2018-12-08 RX ADMIN — ACETAMINOPHEN 650 MG: 325 TABLET, FILM COATED ORAL at 21:16

## 2018-12-08 RX ADMIN — SENNOSIDES AND DOCUSATE SODIUM 2 TABLET: 8.6; 5 TABLET ORAL at 21:16

## 2018-12-08 RX ADMIN — IOPAMIDOL 54 ML: 755 INJECTION, SOLUTION INTRAVENOUS at 00:53

## 2018-12-08 RX ADMIN — GABAPENTIN 300 MG: 300 CAPSULE ORAL at 11:38

## 2018-12-08 RX ADMIN — Medication 1400 UNITS: at 09:25

## 2018-12-08 RX ADMIN — ASPIRIN 325 MG: 325 TABLET, DELAYED RELEASE ORAL at 11:38

## 2018-12-08 RX ADMIN — ALBUTEROL SULFATE 2.5 MG: 2.5 SOLUTION RESPIRATORY (INHALATION) at 11:41

## 2018-12-08 RX ADMIN — ATORVASTATIN CALCIUM 20 MG: 20 TABLET, FILM COATED ORAL at 11:33

## 2018-12-08 RX ADMIN — GABAPENTIN 300 MG: 300 CAPSULE ORAL at 21:16

## 2018-12-08 RX ADMIN — FUROSEMIDE 40 MG: 10 INJECTION, SOLUTION INTRAMUSCULAR; INTRAVENOUS at 08:01

## 2018-12-08 RX ADMIN — ACETAMINOPHEN 650 MG: 325 TABLET, FILM COATED ORAL at 15:39

## 2018-12-08 RX ADMIN — HEPARIN SODIUM 500 UNITS/HR: 10000 INJECTION, SOLUTION INTRAVENOUS at 00:36

## 2018-12-08 RX ADMIN — METHYLPREDNISOLONE SODIUM SUCCINATE 125 MG: 125 INJECTION, POWDER, FOR SOLUTION INTRAMUSCULAR; INTRAVENOUS at 00:12

## 2018-12-08 RX ADMIN — FLUTICASONE FUROATE AND VILANTEROL TRIFENATATE 1 PUFF: 100; 25 POWDER RESPIRATORY (INHALATION) at 11:42

## 2018-12-08 RX ADMIN — Medication 2300 UNITS: at 00:36

## 2018-12-08 ASSESSMENT — ENCOUNTER SYMPTOMS
DIAPHORESIS: 1
NAUSEA: 1
COUGH: 1

## 2018-12-08 ASSESSMENT — ACTIVITIES OF DAILY LIVING (ADL)
ADLS_ACUITY_SCORE: 13

## 2018-12-08 NOTE — PLAN OF CARE
Problem: Cardiac: ACS (Acute Coronary Syndrome) (Adult)  Goal: Signs and Symptoms of Listed Potential Problems Will be Absent, Minimized or Managed (Cardiac: ACS)  Signs and symptoms of listed potential problems will be absent, minimized or managed by discharge/transition of care (reference Cardiac: ACS (Acute Coronary Syndrome) (Adult) CPG).   Outcome: Improving  Pt alert and oriented x4.From the [facility] living. Up with assist of 1. VSS. 02 2L NC 92 %. Tele . Denies any pain or dizziness. Dyspnea on exertion. NPO except ice chips. Cardiology consult in AM. Heparin drip infusing. Will continue to monitor.

## 2018-12-08 NOTE — CONSULTS
Consult Date:  12/08/2018      CARDIOLOGY CONSULTATION      REFERRING PHYSICIAN:  Hospitalist Service.      INDICATION FOR CARDIAC CONSULTATION:  Shortness of breath and elevated cardiac enzymes.      Dear Doctor:      It is my pleasure to see your patient, Marie Kline, who is a pleasant 86-year-old patient with a history of chronic lymphocytic leukemia, bladder cancer, chronic obstructive pulmonary disease and history of stress cardiomyopathy in the past.  This is a patient who on 11/18 had a left femoral neck fracture due to a fall.  She had open reduction internal fixation.  She was discharged to a transitional care facility, but was unable to wean off oxygen.  She was on 1 liter of oxygen.  Prior to her hip fracture, she was not on oxygen at all.  She has noticed in the week since her discharge that she is more short of breath when she exerts herself such as going to the bathroom or doing her activities of daily living.  However, she had 1 episode yesterday where she developed chest pressure.  She became very sweaty and very nauseated.  She took one of her antinausea medications.  She tried her nebulizer, but her breathing did not improve and then she was brought to the hospital by ambulance.  As part of her evaluation, she had troponins drawn.  The first troponin was abnormal at 0.550, second troponin was 0.739, third troponin 0.583.  The 12-lead electrocardiogram performed at 1:20 in the morning showed no evidence of ischemic changes.  The patient was in sinus rhythm.  There is an intraventricular conduction defect in the inferior leads.  A single PAC was noted.  The patient has not been complaining of exertional chest pain or chest pressure in the days leading up to this.  She did have a CT scan of her chest which showed no evidence of pulmonary embolism.  Ultrasound of the lower extremities showed no evidence of deep venous thrombus.  There was no evidence of aortic dissection.  There was a prominent  interstitial pattern in the lungs, possibly consistent with pulmonary edema.  Her proBNP was significantly raised at 9813.  The patient has been started on intravenous furosemide and intravenous heparin.  She has also been started on metoprolol succinate 25 mg per day.      PAST MEDICAL HISTORY:   1.  Chronic lymphocytic leukemia.   2.  Bladder cancer.   3.  History of stress cardiomyopathy with reversal of the cardiomyopathy.  Was followed by Dr. Ortega in our clinic.   4.  Chronic obstructive pulmonary disease.   5.  Recent left femoral neck fracture with open reduction.   6.  Hyperlipidemia.   7.  Hypothyroidism.   8.  History of tricuspid regurgitation.      PAST SURGICAL HISTORY:  Lymph node biopsy, bladder surgery, cystoscopy, history of fibrovascular enteroscopy, open reduction internal fixation of the left hip.  Coronary angiography on 10/9/2014 showing essentially normal coronary arteries.      MEDICATIONS:   1.  Acetaminophen 650 mg 3 times a day.   2.  Albuterol 2.5 mg 2 times a day.   3.  Atorvastatin 20 mg per day.   4.  Symbicort 2 puffs 2 times a day.   5.  Diphenhydramine 25 mg injection once.   6.  Ferrous sulfate 325 mg per day.   7.  Furosemide 40 mg intravenously q.12 hours.   8.  Heparin bolus plus infusion.   9.  Levothyroxine 75 mcg orally per day.     10.  Methylprednisolone 125 mg intravenously once.   11.  Metoprolol succinate 25 mg per day.      ALLERGIES:  SHE IS ALLERGIC TO X-RAY DYE.  ALLERGIC TO WOUND DRESSING ADHESIVE.      FAMILY HISTORY:  No family history of coronary artery disease.      REVIEW OF SYSTEMS:   CONSTITUTIONAL:  Anxious and tired.   EYES:  Wears spectacles.   ENT:  Negative.   CARDIOVASCULAR:  As above.   RESPIRATORY:  As above.   GASTROINTESTINAL:  Nausea.   GENITOURINARY:  Nil.   MUSCULOSKELETAL:  Nil.   NEUROLOGICAL:  Nil.   PSYCHIATRIC:  Anxious.   HEMATOLYMPHATIC:  Chronic lymphocytic leukemia.   ALLERGY/IMMUNOLOGY:  As above.      PHYSICAL EXAMINATION:  She is an  anxious-appearing patient, but in no apparent distress.   VITAL SIGNS:  Blood pressure of 99/60, pulse rate of 90 beats per minute, sinus rhythm on the monitor, temperature 96.5, respirations 18, O2 sats are 95%.  She is on 2 liters of oxygen.   HEENT:  She has normal facial symmetry.  She has geographic wrinkling on her face consistent with prior tobacco abuse.  Dentition is moderately poor.     NECK:  Jugular venous pulse is not raised.  Carotids are normal with no bruits.   HEART:  [place] beat is not displaced.  Heart sounds 1 and 2 are normal.  Examination of the chest reveals evidence of hyperinflation.   CHEST:  There is diminished intensity of the breath sounds bilaterally.  There is prolonged expiration.   ABDOMEN:  Unremarkable.  No organomegaly noted, though CT did show splenomegaly.  Pedal pulses are 1-2+.  No peripheral edema noted.  She moves all 4 limbs appropriately with no obvious sensory or motor loss.  She is fully oriented to time, place and person with an anxious affect.      LABORATORY INVESTIGATIONS:  Sodium is 136, potassium is 4.5, BUN is 13, creatinine is 0.82.  ProBNP is significantly raised at 9813.  Troponins raised as described above.  Glucose is 124.  White cell count is 79, due predominantly to lymphocytes consistent with chronic lymphocytic leukemia, hemoglobin 9.9, platelet count is 264,000.  Magnesium is 2.4.      SOCIAL HISTORY:  She stopped smoking in 1996.  She does not drink alcohol.  She lives alone, but her daughter visits her every day.        IMPRESSION:     1.  Episode of chest discomfort with a small troponin rise in a patient who has had worsening respiratory status since her hip surgery.  Small troponin rise could suggest acute coronary syndrome.   2.  Chronic obstructive pulmonary disease with evidence of hyperinflation and diminished intensity of the breath sounds.   3.  Symptoms, signs and physical examination as well as laboratory investigations do point towards  congestive heart failure or at least volume overload.   4.  Chronic lymphocytic leukemia.  Significantly raised lymphocyte count.      PLAN:     1.  Firstly, I do agree with diuresis given the patient's findings and raised BNP.     2.  Next, echocardiography has been ordered for this patient.  We will review that echo.  It should be an important investigation.     3.  The patient should have a nuclear stress test performed on Monday to assess for ischemia.  I agree with the diuretic therapy.  Further medications will be based upon the findings on the echo and nuclear stress test.      Many thanks for allowing me to be involved in the care of this extremely nice patient.  We will continue to follow her.         DORETHA AKHTAR MD, [facility]             D: 2018   T: 2018   MT: YOLETTE      Name:     MAGGI RODRIGUEZ   MRN:      [id number]        Account:       [id number]   :      1932           Consult Date:  2018      Document: [id number]

## 2018-12-08 NOTE — H&P
Admitted:     12/07/2018      CHIEF COMPLAINT:  Chest heaviness.      HISTORY OF PRESENT ILLNESS:  Marie Kline is an 86-year-old female with a past medical history significant for chronic lymphocytic leukemia, has followup with [facility], bladder cancer, previous history of stress cardiomyopathy which is resolved, COPD, hypothyroidism, depression who presented today with a complaint of chest heaviness.  The patient was admitted to this hospital on 11/18/2018 for left femoral neck fracture secondary to mechanical fall.  At that time she had open reduction and internal fixation and discharged to the transitional care unit.  The patient was unable to wean off oxygen and was discharged on 2 liters of oxygen at the time and currently she is on 1 liter.      The patient was relatively at her baseline state of health until last night when she noted a heaviness on her chest and felt sweaty and nauseous.  She stated that she was sick to her stomach.  The chest heaviness was substernal.  No radiation, no palpitation.  The patient is currently on 1 liter of oxygen around the clock.  She ambulates with a walker after her recent fall and fractured hip.  Last night she tried to use a nebulizer without improvement and she lives alone and called her daughter and she was brought in by ambulance to the hospital.  The patient has underlying COPD and cough which was not changed from the baseline.  In the emergency room, she was evaluated.  Workup showed troponin of 0.55.  CT scan of the chest, pulmonary protocol was negative for PE and basically she is started on heparin drip and being admitted to the hospital.  I discussed with Dr. Gonzalez regarding this admit.      PAST MEDICAL HISTORY:   1.  Left femoral neck fracture, status post open reduction internal fixation.   2.  Lower extremity skin tear related to the fall recently.   3.  Chronic lymphocytic leukemia.   4.  Bladder cancer as follow up with Dr. Nuñez.   5.   History of stress-induced cardiomyopathy with improvement in LV function on the latest echo.   6.  Chronic obstructive pulmonary disease.   7.  Hypothyroidism.   8.  Hyperlipidemia.   9.  Insomnia.   10.  Takotsubo presentation with mild coronary artery disease.   11.  Tricuspid regurgitation.      PAST SURGICAL HISTORY: Multiple, including   1.  Lymph node biopsy.   2.  Bladder surgery.   3.  Coronary angiogram.   4.  Cystoscopy, multiple times.   5.  History of fibrovascular enteroscopy.   6.  Open reduction internal fixation of left hip.      SOCIAL HISTORY:  The patient is a former smoker.  She quit in 1996.  She does not drink alcohol.  She does not use illicit drugs.  She lives alone and gets help from her daughter who visits her every day.      FAMILY HISTORY:  Reviewed and noncontributory.      REVIEW OF SYSTEMS:  Ten-point reviewed, all are negative except those mentioned in history of present illness.      ALLERGIES: DIAGNOSTIC X-RAY MATERIALS, CONTRAST DYE.   Prior to Admission Medications   Prescriptions Last Dose Informant Patient Reported? Taking?   Acetaminophen (TYLENOL PO) 12/7/2018 at Unknown time  Yes Yes   Sig: Take 650 mg by mouth 3 times daily    Multiple Vitamins-Minerals (MULTIVITAMIN GUMMIES ADULT PO) 12/7/2018 at Unknown time  Yes Yes   Sig: Take 2 tablets by mouth daily    TRAMADOL HCL PO Past Week at Unknown time  Yes Yes   Sig: Take 50 mg by mouth every 6 hours as needed for moderate to severe pain    albuterol (2.5 MG/3ML) 0.083% neb solution 12/7/2018 at Unknown time  Yes Yes   Sig: Take 2.5 mg by nebulization 2 times daily   albuterol (PROAIR HFA/PROVENTIL HFA/VENTOLIN HFA) 108 (90 BASE) MCG/ACT Inhaler   No Yes   Sig: Inhale 2 puffs into the lungs every 6 hours as needed for wheezing   aspirin 325 MG EC tablet 12/7/2018 at Unknown time  No Yes   Sig: Take 1 tablet (325 mg) by mouth daily   atorvastatin (LIPITOR) 20 MG tablet 12/7/2018 at Unknown time  No Yes   Sig: TAKE ONE TABLET BY  MOUTH ONE TIME DAILY    budesonide-formoterol (SYMBICORT) 80-4.5 MCG/ACT Inhaler 12/7/2018 at Unknown time  No Yes   Sig: Inhale 2 puffs into the lungs 2 times daily   denosumab (PROLIA) 60 MG/ML SOLN injection   No Yes   Sig: Inject 1 mL (60 mg) Subcutaneous every 6 months   ferrous sulfate (IRON) 325 (65 FE) MG tablet 12/7/2018 at Unknown time  Yes Yes   Sig: Take by mouth daily (with breakfast)   furosemide (LASIX) 20 MG tablet 12/7/2018 at Unknown time  No Yes   Sig: TAKE ONE TABLET BY MOUTH ONE TIME DAILY    gabapentin (NEURONTIN) 300 MG capsule 12/7/2018 at Unknown time  Yes Yes   Sig: Take 300 mg by mouth 2 times daily   levothyroxine (SYNTHROID/LEVOTHROID) 75 MCG tablet 12/7/2018 at Unknown time  No Yes   Sig: TAKE ONE TABLET BY MOUTH ONE TIME DAILY    metoprolol succinate (TOPROL-XL) 25 MG 24 hr tablet 12/7/2018 at Unknown time  No Yes   Sig: Take 1 tablet (25 mg) by mouth daily   ondansetron (ZOFRAN-ODT) 4 MG ODT tab   No Yes   Sig: Take 1 tablet (4 mg) by mouth every 6 hours as needed for nausea or vomiting   order for DME   No No   Sig: Equipment being ordered: Nebulizer  [facility]ax order [facility] [phone]   senna-docusate (SENOKOT-S;PERICOLACE) 8.6-50 MG per tablet 12/7/2018 at Unknown time  No Yes   Sig: Take 2 tablets by mouth 2 times daily   traZODone (DESYREL) 50 MG tablet 12/6/2018 at Unknown time  Yes Yes   Sig: Take 100 mg by mouth At Bedtime      Facility-Administered Medications: None     PHYSICAL EXAMINATION:   GENERAL:  The patient is awake, alert, oriented, comfortable, not in any form of distress.   VITAL SIGNS:  Blood pressure 128/76, pulse rate 74, temperature 97.3, oxygen saturation 93%.   HEENT:  Pink and anicteric.  Extraocular muscle movements intact.   NECK:  Supple, no JVD, no thyromegaly.   CHEST:  Good air entry.  Prolonged expiratory phases.  No significant wheezing.   CARDIOVASCULAR:  S1, S2 are heard.  No gallop.  There is a systolic murmur at the left sternal  border.   ABDOMEN:  Soft, nontender, nondistended, positive bowel sounds.  No organomegaly.   EXTREMITIES:  There are areas of previous scar on the left lower leg related to previous fall with left leg is slightly more swollen than the right.   PSYCHIATRIC:  Normal mood and affect.  Keeps eye contact, responds to questions appropriately.   NEUROLOGIC:  No focal neurologic deficit.  Cranial nerves grossly intact.  Moves all extremities.  Power 5/5.        LABORATORY DATA:  Electrolytes: Sodium 136, potassium 4.5, BUN 3, creatinine 0.8, calcium 8.7.  BNP 9800, troponin 0.55, glucose 124.  WBC 79, hemoglobin 9.9, platelet 264,000. CT scan with pulmonary protocol negative for pulmonary embolism, mildly enlarged mediastinal lymph nodes similar to the previous exam.  No splenomegaly noted.        ASSESSMENT AND PLAN:  Marie Kline is an 86-year-old female who has a significant past medical history including chronic lymphocytic leukemia, hypothyroidism, COPD, bladder cancer, recent left femoral neck fracture, status post ORIF, currently oxygen dependent, who presented after chest heaviness and nausea and sweating, found to have elevated troponin and BNP and being admitted to the hospitalist.      EKG at this time showed a sinus rhythm with PVCs, QTc is 478.   1.  Suspected non-ST elevation MI.   2.  Recent left femoral neck fracture, status post ORIF with left leg swelling, rule out DVT.   3.  Chronic lymphocytic leukemia.   4.  Chronic obstructive pulmonary disease, no exacerbation.   5.  Hypothyroidism.   6.  Left extremity scars from recent skin tears.   7.  History stress cardiomyopathy with improvement.     PLAN:  The patient is being admitted as an inpatient.  Expect at least a 2-night stay in the hospital.  We will get serial troponins.  Echocardiogram is ordered to assess LV function, elevated pro BNP, no sign of fluid overload.  Cardiology is consulted.  The patient will be on a baby aspirin and also on heparin  drip.  We will continue her metoprolol.  In the meantime, most of her home medication will be continued after review by Pharmacy.  The patient also will be seen by her hematology/oncologist.  She follows up with Dr. Ivory from [facility].  Further workup depends on serial troponins and Echo findings.  I discussed with the patient and also with her daughter at length at bedside.  I also discussed with Dr. Gonzalez, ED physician.  All the patient's and her daughter's questions and concerns addressed, showed understanding.  The patient is Full Code.         MARIEL UBTLER MD             D: 2018   T: 2018   MT: NTS      Name:     MAGGI RODRIGUEZ   MRN:      [id number]        Account:      [id number]   :      1932        Admitted:     2018                   Document: [id number]

## 2018-12-08 NOTE — ED PROVIDER NOTES
History     Chief Complaint:  Chest heaviness    HPI   Marie Kline is a 86 year old female who presents with multiple concerns including dizziness, chest heaviness and increased congestion in the setting of recent Left bipolar hemiarthroplasty on 11/19. The patient reports that she has been feeling some chest heaviness, has been feeling sweaty as well as some nausea. The patient reports that the chest heaviness is worst when she stands up. The patient notes that additionally, she has needed 1L of oxygen at home since her orthopedic procedure. Tonight the patient reports that she tried to use her Nebulizer without improvement. She was afraid being by herself so she called her daughter who urged that she use her life alert which she did. The patient also adds that she has a cough which is not changed from her baseline and she states that she mostly doesn't notice it.     Allergies:  Contrast Dye  Wound Dressing Adhesive      Medications:    Tylenol Po  Aspirin 325 Mg Ec Tablet  Atorvastatin   Symbicort   Prolia  Iron  Lasix  Neurontin  Synthroid/Levothroid  Toprol-Xl  Zofran-Odt  Senokot-S;Pericolace  Tramadol Hcl Po  Trazodone     Past Medical History:    Arthritis   Bladder cancer (H)   Cardiomyopathy (H)   CLL (chronic lymphocytic leukemia) (H)   Congestive heart failure (H)   COPD (chronic obstructive pulmonary disease) (H)   Hypertension   Hypothyroid   Mumps   Myocardial infarction (H)   Pulmonary edema cardiac cause (H)   Tricuspid valve disorders, specified as non rheumatic      Past Surgical History:    Bladder Surgery  Biopsy lymph node inguinal  CAD  Cystoscopy  EGD  Left bipolar hemiarthroplasty.       Family History:    Cerebrovascular Disease  Cancer    Social History:  The patient  reports that she quit smoking about 22 years ago. Her smoking use included Cigarettes. She has never used smokeless tobacco. She reports that she does not drink alcohol or use illicit drugs.   Marital Status:    "[5]      Review of Systems   Constitutional: Positive for diaphoresis.   Respiratory: Positive for cough.    Cardiovascular:        Chest heaviness   Gastrointestinal: Positive for nausea.   All other systems reviewed and are negative.      Physical Exam     Vital signs  Patient Vitals for the past 24 hrs:   BP Temp Temp src Pulse Heart Rate Resp SpO2 Height Weight   12/08/18 0154 128/76 97.3  F (36.3  C) Oral - 92 20 93 % 1.499 m (4' 11\") 48.8 kg (107 lb 9.6 oz)   12/08/18 0127 - - - - - 16 - - -   12/08/18 0124 - - - - - 17 - - -   12/08/18 0123 110/70 - - - 87 - 98 % - -   12/08/18 0016 - - - - - - - 1.499 m (4' 11\") 48.8 kg (107 lb 9.6 oz)   12/07/18 2330 - - - - - - 95 % - -   12/07/18 2241 128/79 98.8  F (37.1  C) Temporal 74 - 18 (!) 89 % - -        Physical Exam  Nursing note and vitals reviewed.  Constitutional: Cooperative.   HENT:   Mouth/Throat: Moist mucous membranes.   Eyes: EOMI, nonicteric sclera  Cardiovascular: Normal rate, regular rhythm, no murmurs, rubs, or gallops  Pulmonary/Chest: Effort normal and breath sounds normal. No respiratory distress. Scant wheezes on left. No rales. On 1L O2.   Abdominal: Soft. Nontender, nondistended, no guarding or rigidity. BS present.   Musculoskeletal: Normal range of motion.   Neurological: Alert. Moves all extremities spontaneously.   Skin: Skin is warm and dry. No rash noted.   Psychiatric: Normal mood and affect.       Emergency Department Course   ECG (01:20:57):  Rate 87 bpm. MS interval 202. QRS duration 96. QT/QTc 398/478. P-R-T axes 83 56 68. Sinus rhythm with premature supraventricular complexes. Low voltage QRS. Borderline EKG  Interpreted at 0121 by Joseph Gonzalez MD.     Imaging:  Chest CT, IV contrast only - PE protocol   Preliminary Result   IMPRESSION:   1. There is no evidence of pulmonary embolus. No aortic aneurysm. No   evidence of aortic dissection.   2. A few mildly enlarged mediastinal lymph nodes similar to the   previous exam. "   3. Prominent interstitial pattern in the lungs may be pulmonary edema.   4. Splenomegaly.        Results as read by radiology.   I communicated the results of the imaging studies with the patient who expressed understanding of these findings.      Laboratory:  CBC: WBC 79 HH, RBC 3.35, HGB 9.9, 'HCT 32.8, MCHC 30.2, RDW 17.4, otherwise within normal limits  BMP: Glucose 124, otherwise within normal limits  Troponin .0.550HH  D Dimer 1.9  BNP 9813    Interventions:  0012: Solumedrol 125mg IV  0012: Benadryl 25mg IV  0036: Heparin loading dose 2300 units  0036: Heparin 55889 units 500units / hr     Emergency Department Course:  Past medical records, nursing notes, and vitals reviewed.  2302: I performed an exam of the patient and obtained history, as documented above.       2355: I update the family on the Emergency Department course and findings.     0049: Discussed the patient with Dr. Johnson, who will admit the patient to a Med /Surg  bed for further monitoring, evaluation, and treatment.      Findings and plan explained to the Patient who consents to admission.   Impression & Plan      Medical Decision Making:  Pt presents with CC dizziness, chest pain. DDx includes ACS, PE, COPD exacerbation, stress cardiomyopathy (pt has remote history), CHF, vertigo, stroke, among many other etiologies. EKG nonischemic, but troponin is elevated. Given history of symptoms with minimal exertion, this is concerning for NSTEMI. Will initiate heparin. Pt with hx of malignancy and CLL, has elevated dimer. Sent for CT chest which is negative for acute etiology apart from mild pulmonary edema, which matches her elevated probnp. Will hold lasix for now until sure euvolemic. Pt respiratory status currently uncompromised. Discussed with pt and family and they consent to admission. Dr. Slaughter accepts. All pt/family's questions answered and they're in agreement with the plan.     Diagnosis:    ICD-10-CM    1. NSTEMI (non-ST elevated  myocardial infarction) (H) I21.4 CBC with platelets     Disposition:  Admitted to Hospitalist service.     I, Marj Stone, am serving as a scribe at 11:02 PM on 12/7/2018 to document services personally performed by Joseph Gonzalez MD based on my observations and the provider's statements to me.    [facility] EMERGENCY DEPARTMENT       Joseph Gonzalez MD  12/08/18 0947

## 2018-12-08 NOTE — CONSULTS
[facility] Consultation      Marie Kline MRN# [id number]   YOB: 1932 Age: 86 year old   Date of Admission: 12/7/2018  Requesting physician: Lokesh Slaughter MD  Reason for consult: CLL           Assessment and Plan:   86 year-old with CLL admitted for acute diastolic CHF exacerbation.    1. CLL, not in remission  - Has had increasing lymphocytosis and anemia slightly less than 10 g/dL in addition to splenomegaly.  - Review of clinic notes show that patient has had discussion to delay initiation of CLL treatment until January due to trips over the holiday.  - Will defer treatment discussion to Dr. Ivory for recovery from her CHF exacerbation    2. Anemia related to CLL disease progression  - As noted above, patient will discuss treatment options with Dr. Ivory further after recovery from CHF exacerbation  - Continue serial CBC monitoring.    3. Acute diastolic CHF exacerbation with related dyspnea and dizziness  - Cardiology following. Echo pending. Nuclear stress test recommended by Cardiology.  - Continue diuresis.    Full code               Chief Complaint:   Dizziness           History of Present Illness:   This patient is a 86 year old female with initial diagnosis of CLL in 4/2007 with Montero stage I disease, but now with Montero stage III disease and followed by Dr. Isabel Ivory.      ONCOLOGICAL HISTORY as per Dr. Ivory:  The patient was diagnosed back in April 2007, and has been receiving IVIG therapy only. Her white count has been high from the start.  Monthly IVIG  Received 1 dose of IV rituximab in the hospital, March 23, 2017, this was done for white count of 200-300 range there was concern of pseudohyperkalemia also.  -October 2018: Lymph node biopsy performed to confirm CLL and no evidence of transformation this was of right inguinal lymph node.  -History of Bladder Cancer - Having BCG treatment by Dr. Nuñez.  She had a f/u cystoscopy on 5/19/16 and per Dr. Nuñez's note, no evidence  "for recurrence so she will receive 3 weekly doses of BCG maintenance therapy. She was due to receive when hospitalized but now delayed due to hospitalization/infection. She recently had a cystocopsy (10/11/18), no recurrent transitional cell carcinoma the bladder.  She is due for BCG treatment in January and then a follow-up cystoscopy in .     Now presents with dizziness, dyspnea and found to have elevated cardiac enzymes.  Echo pending.           Physical Exam:   Vitals were reviewed  Blood pressure 99/60, pulse 74, temperature 96.5  F (35.8  C), temperature source Oral, resp. rate 18, height 1.499 m (4' 11\"), weight 48.8 kg (107 lb 9.6 oz), SpO2 95 %, not currently breastfeeding.  Temperatures:  Current - Temp: 96.5  F (35.8  C); Max - Temp  Av.5  F (36.4  C)  Min: 96.5  F (35.8  C)  Max: 98.8  F (37.1  C)  Respiration range: Resp  Av.8  Min: 16  Max: 20  Pulse range: Pulse  Av  Min: 74  Max: 74  Blood pressure range: Systolic (24hrs), Av , Min:99 , Max:128   ; Diastolic (24hrs), Av, Min:60, Max:79    Pulse oximetry range: SpO2  Av.8 %  Min: 89 %  Max: 98 %    Intake/Output Summary (Last 24 hours) at 18 1111  Last data filed at 18 1048   Gross per 24 hour   Intake                0 ml   Output              700 ml   Net             -700 ml       GENERAL: No acute distress.  SKIN: No rashes or jaundice.  HEENT: Normocephalic, atraumatic. Eyes anicteric. Oropharynx is clear.  LYMPH: No palpable lymphadenopathy in the cervical, supraclavicular, axillary regions.  HEART: Regular rate and rhythm with no murmurs.  LUNGS: Clear bilaterally anteriorly.  ABDOMEN: Soft, nontender, nondistended with no palpable hepatosplenomegaly.  EXTREMITIES: No clubbing, cyanosis, or edema.  MENTAL: Alert and oriented to person, place, and time.  NEURO: Cranial nerves II through XII grossly intact.            Past Medical History:   I have reviewed this patient's past medical " history  Past Medical History:   Diagnosis Date     Arthritis     Generalized     Bladder cancer (H)      Bladder cancer (H)      Cardiomyopathy (H)      CLL (chronic lymphocytic leukemia) (H)      Congestive heart failure (H) 10/24/2014    flash pulm edema ass w/Takotsubo     COPD (chronic obstructive pulmonary disease) (H)     noc O2     Hypertension      Hypothyroid      Mumps      Myocardial infarction (H) 10/2014    Takotsubo presentation w/mild CAD     Pulmonary edema cardiac cause (H) 10/08/2014    associated w/Takotsubo ACS     Tricuspid valve disorders, 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    TR 2-3+ per echo             Past Surgical History:   I have reviewed this patient's past surgical history  Past Surgical History:   Procedure Laterality Date     BIOPSY LYMPH NODE INGUINAL Right 10/23/2018    Procedure: excsional biopsy right inguinal lymph node;  Surgeon: Reji Connors MD;  Location:  OR     BLADDER SURGERY       CORONARY ANGIOGRAPHY ADULT ORDER  10/2014    minimal CAD     CYSTOSCOPY       CYSTOSCOPY, BIOPSY BLADDER, COMBINED N/A 2/9/2016    Procedure: COMBINED CYSTOSCOPY, BIOPSY BLADDER;  Surgeon: Kar Nuñez MD;  Location:  OR     CYSTOSCOPY, TRANSURETHRAL RESECTION (TUR) TUMOR BLADDER, COMBINED N/A 2/9/2016    Procedure: COMBINED CYSTOSCOPY, TRANSURETHRAL RESECTION (TUR) TUMOR BLADDER;  Surgeon: Kar Nuñez MD;  Location:  OR     ESOPHAGOSCOPY, GASTROSCOPY, DUODENOSCOPY (EGD), COMBINED N/A 6/22/2016    Procedure: COMBINED ESOPHAGOSCOPY, GASTROSCOPY, DUODENOSCOPY (EGD);  Surgeon: Freddie Diez MD;  Location:  GI     OPEN REDUCTION INTERNAL FIXATION HIP BIPOLAR Left 11/19/2018    Procedure: Left bipolar hemiarthroplasty;  Surgeon: Scar Reynolds MD;  Location:  OR               Social History:   I have reviewed this patient's social history  Social History   Substance Use Topics     Smoking status: Former Smoker     Types: Cigarettes     Quit date:  2/3/1996     Smokeless tobacco: Never Used     Alcohol use No             Family History:   I have reviewed this patient's family history  Family History   Problem Relation Age of Onset     Cerebrovascular Disease Mother      Cancer Father              Allergies:     Allergies   Allergen Reactions     Diagnostic X-Ray Materials Hives     CT DYE     Contrast Dye Hives     CT DYE     Wound Dressing Adhesive              Medications:   I have reviewed this patient's current medications  Prescriptions Prior to Admission   Medication Sig Dispense Refill Last Dose     Acetaminophen (TYLENOL PO) Take 650 mg by mouth 3 times daily    12/7/2018 at Unknown time     albuterol (2.5 MG/3ML) 0.083% neb solution Take 2.5 mg by nebulization 2 times daily   12/7/2018 at Unknown time     albuterol (PROAIR HFA/PROVENTIL HFA/VENTOLIN HFA) 108 (90 BASE) MCG/ACT Inhaler Inhale 2 puffs into the lungs every 6 hours as needed for wheezing 1 Inhaler 8 Taking     aspirin 325 MG EC tablet Take 1 tablet (325 mg) by mouth daily 45 tablet 0 12/7/2018 at Unknown time     atorvastatin (LIPITOR) 20 MG tablet TAKE ONE TABLET BY MOUTH ONE TIME DAILY  90 tablet 1 12/7/2018 at Unknown time     budesonide-formoterol (SYMBICORT) 80-4.5 MCG/ACT Inhaler Inhale 2 puffs into the lungs 2 times daily 3 Inhaler 1 12/7/2018 at Unknown time     denosumab (PROLIA) 60 MG/ML SOLN injection Inject 1 mL (60 mg) Subcutaneous every 6 months 1 mL 0 Taking     ferrous sulfate (IRON) 325 (65 FE) MG tablet Take by mouth daily (with breakfast)   12/7/2018 at Unknown time     furosemide (LASIX) 20 MG tablet TAKE ONE TABLET BY MOUTH ONE TIME DAILY  90 tablet 1 12/7/2018 at Unknown time     gabapentin (NEURONTIN) 300 MG capsule Take 300 mg by mouth 2 times daily   12/7/2018 at Unknown time     levothyroxine (SYNTHROID/LEVOTHROID) 75 MCG tablet TAKE ONE TABLET BY MOUTH ONE TIME DAILY  90 tablet 1 12/7/2018 at Unknown time     metoprolol succinate (TOPROL-XL) 25 MG 24 hr tablet  Take 1 tablet (25 mg) by mouth daily 90 tablet 3 12/7/2018 at Unknown time     Multiple Vitamins-Minerals (MULTIVITAMIN GUMMIES ADULT PO) Take 2 tablets by mouth daily    12/7/2018 at Unknown time     ondansetron (ZOFRAN-ODT) 4 MG ODT tab Take 1 tablet (4 mg) by mouth every 6 hours as needed for nausea or vomiting 20 tablet 0 Taking     senna-docusate (SENOKOT-S;PERICOLACE) 8.6-50 MG per tablet Take 2 tablets by mouth 2 times daily 40 tablet 0 12/7/2018 at Unknown time     TRAMADOL HCL PO Take 50 mg by mouth every 6 hours as needed for moderate to severe pain    Past Week at Unknown time     traZODone (DESYREL) 50 MG tablet Take 100 mg by mouth At Bedtime   12/6/2018 at Unknown time     order for DME Equipment being ordered: Nebulizer  Fax order to [facility] [phone] 1 each 0 Taking     Current Facility-Administered Medications Ordered in Epic   Medication Dose Route Frequency Last Rate Last Dose     acetaminophen (TYLENOL) tablet 650 mg  650 mg Oral TID         acetaminophen (TYLENOL) tablet 650 mg  650 mg Oral Q4H PRN         albuterol (PROAIR HFA/PROVENTIL HFA/VENTOLIN HFA) 108 (90 Base) MCG/ACT inhaler 2 puff  2 puff Inhalation Q6H PRN         albuterol (PROVENTIL) neb solution 2.5 mg  2.5 mg Nebulization BID         atorvastatin (LIPITOR) tablet 20 mg  20 mg Oral Daily         ferrous sulfate (FEROSUL) tablet 325 mg  325 mg Oral Daily         fluticasone-vilanterol (BREO ELLIPTA) 100-25 MCG/INH inhaler 1 puff  1 puff Inhalation Daily         furosemide (LASIX) injection 40 mg  40 mg Intravenous Q12H   40 mg at 12/08/18 0801     heparin infusion 25,000 units in 0.45% NaCl 250 mL  600 Units/hr Intravenous Continuous 6 mL/hr at 12/08/18 0925 600 Units/hr at 12/08/18 0925     levothyroxine (SYNTHROID/LEVOTHROID) tablet 75 mcg  75 mcg Oral Daily         magnesium sulfate 2 g in NS intermittent infusion ([employer] or [facility])  2 g Intravenous Daily PRN         magnesium sulfate 4 g in 100 mL sterile  water (premade)  4 g Intravenous Q4H PRN         melatonin tablet 1 mg  1 mg Oral At Bedtime PRN         metoprolol succinate ER (TOPROL-XL) 24 hr tablet 25 mg  25 mg Oral Daily         naloxone (NARCAN) injection 0.1-0.4 mg  0.1-0.4 mg Intravenous Q2 Min PRN         ondansetron (ZOFRAN-ODT) ODT tab 4 mg  4 mg Oral Q6H PRN        Or     ondansetron (ZOFRAN) injection 4 mg  4 mg Intravenous Q6H PRN         Patient is already receiving anticoagulation with heparin, enoxaparin (LOVENOX), warfarin (COUMADIN)  or other anticoagulant medication   Does not apply Continuous PRN         polyethylene glycol (MIRALAX/GLYCOLAX) Packet 17 g  17 g Oral Daily PRN         potassium chloride (KLOR-CON) Packet 20-40 mEq  20-40 mEq Oral or Feeding Tube Q2H PRN         potassium chloride 10 mEq in 100 mL intermittent infusion with 10 mg lidocaine  10 mEq Intravenous Q1H PRN         potassium chloride 10 mEq in 100 mL sterile water intermittent infusion (premix)  10 mEq Intravenous Q1H PRN         potassium chloride ER (K-DUR/KLOR-CON M) CR tablet 20-40 mEq  20-40 mEq Oral Q2H PRN         prochlorperazine (COMPAZINE) injection 5 mg  5 mg Intravenous Q6H PRN        Or     prochlorperazine (COMPAZINE) tablet 5 mg  5 mg Oral Q6H PRN        Or     prochlorperazine (COMPAZINE) Suppository 12.5 mg  12.5 mg Rectal Q12H PRN         senna-docusate (SENOKOT-S/PERICOLACE) 8.6-50 MG per tablet 1 tablet  1 tablet Oral BID PRN        Or     senna-docusate (SENOKOT-S/PERICOLACE) 8.6-50 MG per tablet 2 tablet  2 tablet Oral BID PRN         traMADol (ULTRAM) half-tab 25-50 mg  25-50 mg Oral Q6H PRN         No current Epic-ordered outpatient prescriptions on file.             Review of Systems:   The 10 point Review of Systems is negative other than noted in the HPI.            Data:   All laboratory data reviewed  Results for orders placed or performed during the hospital encounter of 12/07/18 (from the past 24 hour(s))   CBC with platelets differential    Result Value Ref Range    WBC 79.0 (HH) 4.0 - 11.0 10e9/L    RBC Count 3.35 (L) 3.8 - 5.2 10e12/L    Hemoglobin 9.9 (L) 11.7 - 15.7 g/dL    Hematocrit 32.8 (L) 35.0 - 47.0 %    MCV 98 78 - 100 fl    MCH 29.6 26.5 - 33.0 pg    MCHC 30.2 (L) 31.5 - 36.5 g/dL    RDW 17.4 (H) 10.0 - 15.0 %    Platelet Count 264 150 - 450 10e9/L    Diff Method Manual Differential     % Neutrophils 7.0 %    % Lymphocytes 91.0 %    % Monocytes 1.0 %    % Eosinophils 1.0 %    % Basophils 0.0 %    Absolute Neutrophil 5.5 1.6 - 8.3 10e9/L    Absolute Lymphocytes 71.9 (H) 0.8 - 5.3 10e9/L    Absolute Monocytes 0.8 0.0 - 1.3 10e9/L    Absolute Eosinophils 0.8 (H) 0.0 - 0.7 10e9/L    Absolute Basophils 0.0 0.0 - 0.2 10e9/L    Anisocytosis Slight     Microcytes Present     Hypochromasia Present     Platelet Estimate       Automated count confirmed.  Platelet morphology is normal.   Basic metabolic panel   Result Value Ref Range    Sodium 136 133 - 144 mmol/L    Potassium 4.5 3.4 - 5.3 mmol/L    Chloride 99 94 - 109 mmol/L    Carbon Dioxide 32 20 - 32 mmol/L    Anion Gap 5 3 - 14 mmol/L    Glucose 124 (H) 70 - 99 mg/dL    Urea Nitrogen 13 7 - 30 mg/dL    Creatinine 0.82 0.52 - 1.04 mg/dL    GFR Estimate 66 >60 mL/min/1.7m2    GFR Estimate If Black 80 >60 mL/min/1.7m2    Calcium 8.7 8.5 - 10.1 mg/dL   Troponin I   Result Value Ref Range    Troponin I ES 0.550 (HH) 0.000 - 0.045 ug/L   D dimer quantitative   Result Value Ref Range    D Dimer 1.9 (H) 0.0 - 0.50 ug/ml FEU   BNP   Result Value Ref Range    N-Terminal Pro BNP Inpatient 9813 (H) 0 - 1800 pg/mL   Chest CT, IV contrast only - PE protocol    Narrative    CT CHEST PULMONARY EMBOLISM W CONTRAST  12/8/2018 12:53 AM    HISTORY: Chest heaviness, malignancy, elevated D-dimer.    TECHNIQUE: Scans obtained from the apices through the diaphragm with  IV contrast. 54 mL Isovue-370 injected. Radiation dose for this scan  was reduced using automated exposure control, adjustment of the mA  and/or kV  according to patient size, or iterative reconstruction  technique.    COMPARISON: 9/13/2018.    FINDINGS: Evaluation of the pulmonary arterial system shows no  evidence of embolus. The thoracic aorta is calcified and tortuous.  There is no aortic aneurysm. No evidence of aortic dissection, though  opacification of the aorta is suboptimal. The heart size is normal.  There are a few mildly enlarged mediastinal lymph nodes. A pretracheal  node on image number 42 measures approximately 1.1 x 1.0 cm, similar  to the previous exam. There is no hilar or axillary lymph node  enlargement. There is scarring at the right lung apex. Prominent  interstitial markings throughout the lungs may be mild pulmonary  edema. There is a small calcified granuloma in the right lower lobe  posteriorly. There is atelectasis and scarring at the lung bases. Mild  emphysematous disease. No pneumothorax or pleural effusion. There are  bilateral Bochdalek hernias containing fat. Images through the upper  abdomen are remarkable for splenomegaly. The spleen is not completely  included on this exam. There is degenerative disease in the spine.      Impression    IMPRESSION:  1. There is no evidence of pulmonary embolus. No aortic aneurysm. No  evidence of aortic dissection.  2. A few mildly enlarged mediastinal lymph nodes similar to the  previous exam.  3. Prominent interstitial pattern in the lungs may be pulmonary edema.  4. Splenomegaly.    BETTY DURAN MD   EKG 12 lead   Result Value Ref Range    Interpretation ECG Click View Image link to view waveform and result    Troponin I   Result Value Ref Range    Troponin I ES 0.739 (HH) 0.000 - 0.045 ug/L   US Lower Extremity Venous Duplex Left    Narrative    LEFT LOWER EXTREMITY VENOUS DUPLEX ULTRASOUND  12/8/2018 3:55 AM     HISTORY: Rule out deep vein thrombosis, recent hip repair, swelling.    COMPARISON: Bilateral lower extremity venous Doppler ultrasound  3/19/2017.    FINDINGS: The deep veins  in the left lower extremity are compressible  throughout. The deep veins demonstrate normal venous augmentation,  waveforms and color Doppler flow. No evidence of superficial  thrombophlebitis. Left inguinal lymph nodes are present and are likely  reactive in nature. Small cyst is incidentally noted in the right  inguinal region measuring 2 cm in diameter. This is of uncertain  significance. No associated color Doppler flow.      Impression    IMPRESSION:   1. No evidence of left lower extremity DVT or superficial  thrombophlebitis.  2. Probable reactive lymph nodes left inguinal region.  3. Incidental 2 cm cyst right inguinal region. Follow-up as clinically  warranted.    AD CONDE MD   UA with Microscopic reflex to Culture   Result Value Ref Range    Color Urine Yellow     Appearance Urine Clear     Glucose Urine Negative NEG^Negative mg/dL    Bilirubin Urine Negative NEG^Negative    Ketones Urine 5 (A) NEG^Negative mg/dL    Specific Gravity Urine 1.030 1.003 - 1.035    Blood Urine Negative NEG^Negative    pH Urine 7.0 5.0 - 7.0 pH    Protein Albumin Urine Negative NEG^Negative mg/dL    Urobilinogen mg/dL 0.0 0.0 - 2.0 mg/dL    Nitrite Urine Negative NEG^Negative    Leukocyte Esterase Urine Negative NEG^Negative    Source Unspecified Urine     WBC Urine 2 0 - 5 /HPF    RBC Urine 1 0 - 2 /HPF    Squamous Epithelial /HPF Urine <1 0 - 1 /HPF   CBC with platelets   Result Value Ref Range    WBC 81.8 (HH) 4.0 - 11.0 10e9/L    RBC Count 3.37 (L) 3.8 - 5.2 10e12/L    Hemoglobin 9.9 (L) 11.7 - 15.7 g/dL    Hematocrit 33.3 (L) 35.0 - 47.0 %    MCV 99 78 - 100 fl    MCH 29.4 26.5 - 33.0 pg    MCHC 29.7 (L) 31.5 - 36.5 g/dL    RDW 17.5 (H) 10.0 - 15.0 %    Platelet Count 235 150 - 450 10e9/L   Troponin I   Result Value Ref Range    Troponin I ES 0.583 (HH) 0.000 - 0.045 ug/L   Heparin 10a Level   Result Value Ref Range    Heparin 10A Level 0.11 IU/mL   Magnesium   Result Value Ref Range    Magnesium 2.4 (H) 1.6 - 2.3 mg/dL

## 2018-12-08 NOTE — PLAN OF CARE
Problem: Patient Care Overview  Goal: Plan of Care/Patient Progress Review  Outcome: No Change  VSS, blood pressure soft. Tele SR. ECHO completed this morning. Started on IV lasix. Heparin drip at 6ml/hr. Cardiology following. Hematology/Oncology following. WOC consulted for ulcer to right coccyx and pre-existing bilateral lower extremity wounds. Up with 1. Plan: lexiscan on Monday. Discharge 2-3 days pending cardiology.

## 2018-12-08 NOTE — PROGRESS NOTES
[facility]    Hospitalist Progress Note    Date of Service (when I saw the patient): 12/08/2018    Assessment & Plan   Marie Kline is a 86 year old female who was admitted on 12/7/2018.  Marie Kline is an 86-year-old female who has a significant past medical history including chronic lymphocytic leukemia, hypothyroidism, COPD, bladder cancer, recent left femoral neck fracture, status post aureus, currently oxygen dependent at 1liter since her left hip surgery , who presented after chest heaviness and nausea and sweating, found to have elevated troponin and Ct chest showed insterstitial pulmonary edema being admitted to the hospitalist.     1. Acute hypoxic respiratory failure due to Acute diastolic CHF exacerbation:    Most likley due to recent hospitalization and volume resuscitation   BNP elevated at 9813 on admiision CT showed interstitial pulmonary edema   Started lasix 40mg IV BID for diuresis   On heparin drip   Was on lasix 20mg Po daily PTA   Echo pending  Cardiology consult placed   Follow I&OS closely   Daily weights ordered . Follow BMP closely   2. Elevated troponin:  Most likley due to demand ischemia with CHF contributing   trops remain flat at 0.05  Cardiology planning on redd scan stress test on Monday   3. Leucocytosis and splenomegaly due to CLL:  Followed by [facility]   4. H/O COPD:  Stable   Continue breo elipta and albuterol inhalers   5. HTN:  On toprol Xl 25mg PO daily   6. HYpothyroidism:  On levothyroxine   7. HYperlipidemia:  On statin           DVT Prophylaxis: Pneumatic Compression Devices and regular aspirin   Code Status: Full Code    Disposition: Expected discharge in 2-3days when OK with cardiology   Felipa Plata MD  [phone] (P)      Interval History   SOB little better since IV lasix dose was given. Occasional cough . Chest heaviness improved     -Data reviewed today: I reviewed all new labs and imaging results over the last 24 hours. I personally reviewed no images  or EKG's today.    Physical Exam   Temp: 96.5  F (35.8  C) Temp src: Oral BP: 99/60 Pulse: 74 Heart Rate: 90 Resp: 18 SpO2: 95 % O2 Device: Nasal cannula Oxygen Delivery: 2 LPM  Vitals:    12/08/18 0016 12/08/18 0154   Weight: 48.8 kg (107 lb 9.6 oz) 48.8 kg (107 lb 9.6 oz)     Vital Signs with Ranges  Temp:  [96.5  F (35.8  C)-98.8  F (37.1  C)] 96.5  F (35.8  C)  Pulse:  [74] 74  Heart Rate:  [87-92] 90  Resp:  [16-20] 18  BP: ()/(60-79) 99/60  SpO2:  [89 %-98 %] 95 %  I/O last 3 completed shifts:  In: -   Out: 300 [Urine:300]    Constitutional: Awake, alert, cooperative, no apparent distress  Respiratory: basilar crackles   Cardiovascular: Regular rate and rhythm, normal S1 and S2, and no murmur noted  GI: Normal bowel sounds, soft, non-distended, non-tender  Skin/Integumen: No rashes, no cyanosis, no edema  Other:     Medications     HEParin 600 Units/hr (12/08/18 0925)     - MEDICATION INSTRUCTIONS -         acetaminophen (TYLENOL) tablet 650 mg  650 mg Oral TID     albuterol  2.5 mg Nebulization BID     atorvastatin  20 mg Oral Daily     ferrous sulfate  325 mg Oral Daily     fluticasone-vilanterol  1 puff Inhalation Daily     furosemide  40 mg Intravenous Q12H     levothyroxine  75 mcg Oral Daily     metoprolol succinate ER  25 mg Oral Daily       Data     Recent Labs  Lab 12/08/18  0651 12/08/18  0212 12/07/18  2322   WBC 81.8*  --  79.0*   HGB 9.9*  --  9.9*   MCV 99  --  98     --  264   NA  --   --  136   POTASSIUM  --   --  4.5   CHLORIDE  --   --  99   CO2  --   --  32   BUN  --   --  13   CR  --   --  0.82   ANIONGAP  --   --  5   CARLOS  --   --  8.7   GLC  --   --  124*   TROPI 0.583* 0.739* 0.550*       Recent Results (from the past 24 hour(s))   Chest CT, IV contrast only - PE protocol    Narrative    CT CHEST PULMONARY EMBOLISM W CONTRAST  12/8/2018 12:53 AM    HISTORY: Chest heaviness, malignancy, elevated D-dimer.    TECHNIQUE: Scans obtained from the apices through the diaphragm  with  IV contrast. 54 mL Isovue-370 injected. Radiation dose for this scan  was reduced using automated exposure control, adjustment of the mA  and/or kV according to patient size, or iterative reconstruction  technique.    COMPARISON: 9/13/2018.    FINDINGS: Evaluation of the pulmonary arterial system shows no  evidence of embolus. The thoracic aorta is calcified and tortuous.  There is no aortic aneurysm. No evidence of aortic dissection, though  opacification of the aorta is suboptimal. The heart size is normal.  There are a few mildly enlarged mediastinal lymph nodes. A pretracheal  node on image number 42 measures approximately 1.1 x 1.0 cm, similar  to the previous exam. There is no hilar or axillary lymph node  enlargement. There is scarring at the right lung apex. Prominent  interstitial markings throughout the lungs may be mild pulmonary  edema. There is a small calcified granuloma in the right lower lobe  posteriorly. There is atelectasis and scarring at the lung bases. Mild  emphysematous disease. No pneumothorax or pleural effusion. There are  bilateral Bochdalek hernias containing fat. Images through the upper  abdomen are remarkable for splenomegaly. The spleen is not completely  included on this exam. There is degenerative disease in the spine.      Impression    IMPRESSION:  1. There is no evidence of pulmonary embolus. No aortic aneurysm. No  evidence of aortic dissection.  2. A few mildly enlarged mediastinal lymph nodes similar to the  previous exam.  3. Prominent interstitial pattern in the lungs may be pulmonary edema.  4. Splenomegaly.    BETTY DURAN MD   US Lower Extremity Venous Duplex Left    Narrative    LEFT LOWER EXTREMITY VENOUS DUPLEX ULTRASOUND  12/8/2018 3:55 AM     HISTORY: Rule out deep vein thrombosis, recent hip repair, swelling.    COMPARISON: Bilateral lower extremity venous Doppler ultrasound  3/19/2017.    FINDINGS: The deep veins in the left lower extremity are  compressible  throughout. The deep veins demonstrate normal venous augmentation,  waveforms and color Doppler flow. No evidence of superficial  thrombophlebitis. Left inguinal lymph nodes are present and are likely  reactive in nature. Small cyst is incidentally noted in the right  inguinal region measuring 2 cm in diameter. This is of uncertain  significance. No associated color Doppler flow.      Impression    IMPRESSION:   1. No evidence of left lower extremity DVT or superficial  thrombophlebitis.  2. Probable reactive lymph nodes left inguinal region.  3. Incidental 2 cm cyst right inguinal region. Follow-up as clinically  warranted.    AD CONDE MD

## 2018-12-08 NOTE — ED NOTES
"[facility]  ED Nurse Handoff Report    Marie Kline is a 86 year old female   ED Chief complaint: Dizziness  . ED Diagnosis:   Final diagnoses:   None     Allergies:   Allergies   Allergen Reactions     Diagnostic X-Ray Materials Hives     CT DYE     Contrast Dye Hives     CT DYE     Wound Dressing Adhesive        Code Status: Full Code  Activity level - Baseline/Home:  Independent. Activity Level - Current:   Stand with Assist. Lift room needed: No. Bariatric: No   Needed: No   Isolation: No. Infection: Not Applicable.     Vital Signs:   Vitals:    12/07/18 2241 12/07/18 2330 12/08/18 0016   BP: 128/79     Pulse: 74     Resp: 18     Temp: 98.8  F (37.1  C)     TempSrc: Temporal     SpO2: (!) 89% 95%    Weight:   48.8 kg (107 lb 9.6 oz)   Height:   1.499 m (4' 11\")       Cardiac Rhythm:  ,      Pain level:    Patient confused: No. Patient Falls Risk: Yes- recent hip replacement  Elimination Status: Has voided   Patient Report - Initial Complaint: Dizziness. Focused Assessment: Gastrointestinal - Gastrointestinal Comment: Pt reports to ED with complaints of nausea, vomitting, and dizziness upon standing. N/v ok now.   Cognitive - Cognitive/Neuro/Behavioral WDL:  WDL except Orientation: oriented x 4 Follows Commands: yes Mood/Behavior:  (Pt complains of dizziness when standing, however, does when she is lying flat, the dizziness is better.)  Rivka Coma Scale - Best Eye Response: 4-->(E4) spontaneous Best Motor Response: 6-->(M6) obeys commands Best Verbal Response: 5-->(V5) oriented Rivka Coma Scale Score: 15     Abnormal Results:   Labs Ordered and Resulted from Time of ED Arrival Up to the Time of Departure from the ED   CBC WITH PLATELETS DIFFERENTIAL - Abnormal; Notable for the following:        Result Value    WBC 79.0 (*)     RBC Count 3.35 (*)     Hemoglobin 9.9 (*)     Hematocrit 32.8 (*)     MCHC 30.2 (*)     RDW 17.4 (*)     All other components within normal limits   BASIC " METABOLIC PANEL - Abnormal; Notable for the following:     Glucose 124 (*)     All other components within normal limits   TROPONIN I - Abnormal; Notable for the following:     Troponin I ES 0.550 (*)     All other components within normal limits   D DIMER QUANTITATIVE - Abnormal; Notable for the following:     D Dimer 1.9 (*)     All other components within normal limits   NT PROBNP INPATIENT - Abnormal; Notable for the following:     N-Terminal Pro BNP Inpatient 9813 (*)     All other components within normal limits   PERIPHERAL IV CATHETER   ORTHOSTATIC BLOOD PRESSURE AND PULSE   MEASURE WEIGHT     Chest CT, IV contrast only - PE protocol    (Results Pending)       Family Comments: Daughter at bedside.  OBS brochure/video discussed/provided to patient:  N/A  ED Medications:   Medications   heparin infusion 25,000 units in 0.45% NaCl 250 mL (500 Units/hr Intravenous New Bag 12/8/18 0036)   0.9% sodium chloride BOLUS (not administered)   iopamidol (ISOVUE-370) solution 500 mL (not administered)   methylPREDNISolone sodium succinate (solu-MEDROL) injection 125 mg (125 mg Intravenous Given 12/8/18 0012)   diphenhydrAMINE (BENADRYL) injection 25 mg (25 mg Intravenous Given 12/8/18 0012)   heparin Loading Dose bolus dose from infusion pump 2,300 Units (2,300 Units Intravenous Given 12/8/18 0036)     Drips infusing:  Yes  For the majority of the shift, the patient's behavior Green. Interventions performed were na.     Severe Sepsis OR Septic Shock Diagnosis Present: No      ED Nurse Name/Phone Number: Solo Casey,   12:47 AM    RECEIVING UNIT ED HANDOFF REVIEW    Above ED Nurse Handoff Report was reviewed: Yes  Reviewed by: NAVIN BECK on December 8, 2018 at 1:13 AM

## 2018-12-08 NOTE — PHARMACY-ADMISSION MEDICATION HISTORY
Admission medication history interview status for this patient is complete. See [facility] admission navigator for allergy information, prior to admission medications and immunization status.     Medication history interview source(s):Patient  Medication history resources (including written lists, pill bottles, clinic record):[facility] list  Primary pharmacy[facility]    Changes made to [facility] medication list:  Added: -  Deleted: -  Changed: tramadol, tylenol    Actions taken by pharmacist (provider contacted, etc):None     Additional medication history information:None    Medication reconciliation/reorder completed by provider prior to medication history? No    Do you take OTC medications (eg tylenol, ibuprofen, fish oil, eye/ear drops, etc)? Y(Y/N)    For patients on insulin therapy: N (Y/N)      Prior to Admission medications    Medication Sig Last Dose Taking? Auth Provider   Acetaminophen (TYLENOL PO) Take 650 mg by mouth 3 times daily  12/7/2018 at Unknown time Yes Reported, Patient   albuterol (2.5 MG/3ML) 0.083% neb solution Take 2.5 mg by nebulization 2 times daily 12/7/2018 at Unknown time Yes Reported, Patient   albuterol (PROAIR HFA/PROVENTIL HFA/VENTOLIN HFA) 108 (90 BASE) MCG/ACT Inhaler Inhale 2 puffs into the lungs every 6 hours as needed for wheezing  Yes Piper Kiser MD   aspirin 325 MG EC tablet Take 1 tablet (325 mg) by mouth daily 12/7/2018 at Unknown time Yes Jocelin Streeter PA-C   atorvastatin (LIPITOR) 20 MG tablet TAKE ONE TABLET BY MOUTH ONE TIME DAILY  12/7/2018 at Unknown time Yes Piper Kiser MD   budesonide-formoterol (SYMBICORT) 80-4.5 MCG/ACT Inhaler Inhale 2 puffs into the lungs 2 times daily 12/7/2018 at Unknown time Yes Piper Kiser MD   denosumab (PROLIA) 60 MG/ML SOLN injection Inject 1 mL (60 mg) Subcutaneous every 6 months  Yes Piper Kiser MD   ferrous sulfate (IRON) 325 (65 FE) MG tablet Take by mouth daily (with breakfast) 12/7/2018 at Unknown time  Yes Reported, Patient   furosemide (LASIX) 20 MG tablet TAKE ONE TABLET BY MOUTH ONE TIME DAILY  12/7/2018 at Unknown time Yes Leah Ventura, NP   gabapentin (NEURONTIN) 300 MG capsule Take 300 mg by mouth 2 times daily 12/7/2018 at Unknown time Yes Unknown, Entered By History   levothyroxine (SYNTHROID/LEVOTHROID) 75 MCG tablet TAKE ONE TABLET BY MOUTH ONE TIME DAILY  12/7/2018 at Unknown time Yes Piper Kiser MD   metoprolol succinate (TOPROL-XL) 25 MG 24 hr tablet Take 1 tablet (25 mg) by mouth daily 12/7/2018 at Unknown time Yes Piper Kiser MD   Multiple Vitamins-Minerals (MULTIVITAMIN GUMMIES ADULT PO) Take 2 tablets by mouth daily  12/7/2018 at Unknown time Yes Reported, Patient   ondansetron (ZOFRAN-ODT) 4 MG ODT tab Take 1 tablet (4 mg) by mouth every 6 hours as needed for nausea or vomiting  Yes Jocelin Streeter PA-C   senna-docusate (SENOKOT-S;PERICOLACE) 8.6-50 MG per tablet Take 2 tablets by mouth 2 times daily 12/7/2018 at Unknown time Yes Jocelin Streeter PA-C   TRAMADOL HCL PO Take 50 mg by mouth every 6 hours as needed for moderate to severe pain  Past Week at Unknown time Yes Reported, Patient   traZODone (DESYREL) 50 MG tablet Take 100 mg by mouth At Bedtime 12/6/2018 at Unknown time Yes Reported, Patient   order for DME Equipment being ordered: Nebulizer  Fax order to [facility] [phone]   Piper Kiser MD

## 2018-12-08 NOTE — PROGRESS NOTES
"SP[facility]ITUAL [facility] Progress Note  [facility]    Initial visit per pt request. Pt reported that she's often here at the hospital and has always appreciated  support. Pt shared stories about her life and reinier, especially noting that she practices \"giving thanks (and seeing God's blessing)  in all circumstances.\" Pt said she lives a very active life despite her health challenges, and is motivated to remain active as long as possible. While pt said she finds meaning and karlee in this life, pt also looks forward to her eternal life--especially the reunion with her daughter Jia Sellers (who  at age 12). Until then, pt finds support and companionship in her family and friends, and gave special mention to her \"sister\" Salma (pt said \"we're like two peas in a pod\").    Pt speaks realistically about her health challenges and anticipates a few more days in the hospital to resolve her current symptoms. SH affirmed pt's attitude of acceptance and gratitude, and provided emotional/spiritual support and prayer.  SH will refer to [facility] chaplains to follow as needed.                                                                                                                                            Mayur Treadwell M.Div., [facility]  Staff   Pager [phone]    "

## 2018-12-08 NOTE — PROGRESS NOTES
Cardiology consult dictated.  Chest pain.  Shortness of breath over the last week post hip surgery.  Requiring 1 L of oxygen.  Small troponin rise.  Findings suggestive of pulmonary edema with a raised BNP.  Small troponin rise in the setting of chest discomfort  represent an acute coronary syndrome.  Patient had normal coronaries 4 years ago.  Agree with diuresis.  Awaiting the results of the echocardiogram.  Will perform Lexiscan study on Monday.  No caffeine prior to the study.

## 2018-12-08 NOTE — PLAN OF CARE
Problem: Patient Care Overview  Goal: Plan of Care/Patient Progress Review  Outcome: Improving  Pt alert and oriented x4. Up with a assist of 1. VSS o2 2L. Pt uses 1L o2 at home. Denies any chest discomfort or dizziness. Dyspnea on exertion. Tele SR. Heparin and IV fluids infusing. NPO except ice chips. Will continue to monitor.

## 2018-12-08 NOTE — ED NOTES
Bed: HW01  Expected date: 12/7/18  Expected time: 10:19 PM  Means of arrival: Ambulance  Comments:  BV 2

## 2018-12-08 NOTE — ED TRIAGE NOTES
Hx COPD and recent hip replacement. Independent living again post hip replacement after recent surgery. Stood up became very dizzy, nauseated, and had some left sided chest pain. Pt sat down and symptoms slowly resolved. Occurred again when stood for EMS to be transferred to [facility]. Has been congested recently and coughing up green and yellow. BS and VS good for EMS.

## 2018-12-08 NOTE — PLAN OF CARE
Problem: Patient Care Overview  Goal: Plan of Care/Patient Progress Review  PT: PT orders. Will hold today as noted cardiac work up with trops trending.

## 2018-12-08 NOTE — PROGRESS NOTES
The findings on the echocardiogram are most consistent with recurrent stress cardiomyopathy.  However, coronary artery disease can occasionally present with a pattern such as this.  Coronary angiography is indicated.  We will continue with the metoprolol.  It would be difficult to titrate up the dose as the blood pressure is borderline.  Coronary angiography will be performed on Monday.  We will cancel the nuclear stress test.  Will pretreat for contrast allergy.

## 2018-12-09 LAB
ANION GAP SERPL CALCULATED.3IONS-SCNC: 4 MMOL/L (ref 3–14)
BUN SERPL-MCNC: 17 MG/DL (ref 7–30)
CALCIUM SERPL-MCNC: 8.1 MG/DL (ref 8.5–10.1)
CHLORIDE SERPL-SCNC: 101 MMOL/L (ref 94–109)
CO2 SERPL-SCNC: 33 MMOL/L (ref 20–32)
CREAT SERPL-MCNC: 1.1 MG/DL (ref 0.52–1.04)
ERYTHROCYTE [DISTWIDTH] IN BLOOD BY AUTOMATED COUNT: 18.2 % (ref 10–15)
GFR SERPL CREATININE-BSD FRML MDRD: 47 ML/MIN/1.7M2
GLUCOSE BLDC GLUCOMTR-MCNC: 103 MG/DL (ref 70–99)
GLUCOSE BLDC GLUCOMTR-MCNC: 119 MG/DL (ref 70–99)
GLUCOSE SERPL-MCNC: 94 MG/DL (ref 70–99)
HCT VFR BLD AUTO: 32.7 % (ref 35–47)
HGB BLD-MCNC: 9.8 G/DL (ref 11.7–15.7)
LACTATE BLD-SCNC: 0.6 MMOL/L (ref 0.7–2)
LMWH PPP CHRO-ACNC: 0.23 IU/ML
LMWH PPP CHRO-ACNC: 0.29 IU/ML
MCH RBC QN AUTO: 29.8 PG (ref 26.5–33)
MCHC RBC AUTO-ENTMCNC: 30 G/DL (ref 31.5–36.5)
MCV RBC AUTO: 99 FL (ref 78–100)
PLATELET # BLD AUTO: 218 10E9/L (ref 150–450)
POTASSIUM SERPL-SCNC: 4.2 MMOL/L (ref 3.4–5.3)
RBC # BLD AUTO: 3.29 10E12/L (ref 3.8–5.2)
SODIUM SERPL-SCNC: 138 MMOL/L (ref 133–144)
T4 FREE SERPL-MCNC: 1.15 NG/DL (ref 0.76–1.46)
TSH SERPL DL<=0.005 MIU/L-ACNC: 7.36 MU/L (ref 0.4–4)
WBC # BLD AUTO: 91.2 10E9/L (ref 4–11)

## 2018-12-09 PROCEDURE — 99231 SBSQ HOSP IP/OBS SF/LOW 25: CPT | Performed by: INTERNAL MEDICINE

## 2018-12-09 PROCEDURE — 84439 ASSAY OF FREE THYROXINE: CPT | Performed by: INTERNAL MEDICINE

## 2018-12-09 PROCEDURE — 99207 ZZC CDG-MDM COMPONENT: MEETS LOW - DOWN CODED: CPT | Performed by: INTERNAL MEDICINE

## 2018-12-09 PROCEDURE — 85520 HEPARIN ASSAY: CPT | Performed by: INTERNAL MEDICINE

## 2018-12-09 PROCEDURE — 99232 SBSQ HOSP IP/OBS MODERATE 35: CPT | Performed by: INTERNAL MEDICINE

## 2018-12-09 PROCEDURE — 94640 AIRWAY INHALATION TREATMENT: CPT

## 2018-12-09 PROCEDURE — 40000275 ZZH STATISTIC RCP TIME EA 10 MIN

## 2018-12-09 PROCEDURE — 12000007 ZZH R&B INTERMEDIATE

## 2018-12-09 PROCEDURE — 25000132 ZZH RX MED GY IP 250 OP 250 PS 637: Mod: GY | Performed by: INTERNAL MEDICINE

## 2018-12-09 PROCEDURE — A9270 NON-COVERED ITEM OR SERVICE: HCPCS | Mod: GY | Performed by: INTERNAL MEDICINE

## 2018-12-09 PROCEDURE — 36415 COLL VENOUS BLD VENIPUNCTURE: CPT | Performed by: INTERNAL MEDICINE

## 2018-12-09 PROCEDURE — 84443 ASSAY THYROID STIM HORMONE: CPT | Performed by: INTERNAL MEDICINE

## 2018-12-09 PROCEDURE — 94640 AIRWAY INHALATION TREATMENT: CPT | Mod: 76

## 2018-12-09 PROCEDURE — 85027 COMPLETE CBC AUTOMATED: CPT | Performed by: INTERNAL MEDICINE

## 2018-12-09 PROCEDURE — 00000146 ZZHCL STATISTIC GLUCOSE BY METER IP

## 2018-12-09 PROCEDURE — 25000125 ZZHC RX 250: Performed by: INTERNAL MEDICINE

## 2018-12-09 PROCEDURE — 25000128 H RX IP 250 OP 636: Performed by: INTERNAL MEDICINE

## 2018-12-09 PROCEDURE — 80048 BASIC METABOLIC PNL TOTAL CA: CPT | Performed by: INTERNAL MEDICINE

## 2018-12-09 RX ORDER — FUROSEMIDE 10 MG/ML
40 INJECTION INTRAMUSCULAR; INTRAVENOUS EVERY MORNING
Status: DISCONTINUED | OUTPATIENT
Start: 2018-12-10 | End: 2018-12-11

## 2018-12-09 RX ADMIN — ALBUTEROL SULFATE 2.5 MG: 2.5 SOLUTION RESPIRATORY (INHALATION) at 20:37

## 2018-12-09 RX ADMIN — FLUTICASONE FUROATE AND VILANTEROL TRIFENATATE 1 PUFF: 100; 25 POWDER RESPIRATORY (INHALATION) at 07:50

## 2018-12-09 RX ADMIN — FUROSEMIDE 40 MG: 10 INJECTION, SOLUTION INTRAMUSCULAR; INTRAVENOUS at 09:23

## 2018-12-09 RX ADMIN — LEVOTHYROXINE SODIUM 75 MCG: 75 TABLET ORAL at 09:21

## 2018-12-09 RX ADMIN — ACETAMINOPHEN 650 MG: 325 TABLET, FILM COATED ORAL at 15:49

## 2018-12-09 RX ADMIN — HEPARIN SODIUM 700 UNITS/HR: 10000 INJECTION, SOLUTION INTRAVENOUS at 08:54

## 2018-12-09 RX ADMIN — METOPROLOL SUCCINATE 25 MG: 25 TABLET, EXTENDED RELEASE ORAL at 09:22

## 2018-12-09 RX ADMIN — ATORVASTATIN CALCIUM 20 MG: 20 TABLET, FILM COATED ORAL at 09:21

## 2018-12-09 RX ADMIN — GABAPENTIN 300 MG: 300 CAPSULE ORAL at 21:52

## 2018-12-09 RX ADMIN — ACETAMINOPHEN 650 MG: 325 TABLET, FILM COATED ORAL at 09:22

## 2018-12-09 RX ADMIN — ACETAMINOPHEN 650 MG: 325 TABLET, FILM COATED ORAL at 21:53

## 2018-12-09 RX ADMIN — GABAPENTIN 300 MG: 300 CAPSULE ORAL at 09:22

## 2018-12-09 RX ADMIN — ALBUTEROL SULFATE 2.5 MG: 2.5 SOLUTION RESPIRATORY (INHALATION) at 07:50

## 2018-12-09 RX ADMIN — TRAZODONE HYDROCHLORIDE 100 MG: 100 TABLET ORAL at 21:53

## 2018-12-09 RX ADMIN — ASPIRIN 325 MG: 325 TABLET, DELAYED RELEASE ORAL at 09:21

## 2018-12-09 ASSESSMENT — ACTIVITIES OF DAILY LIVING (ADL)
ADLS_ACUITY_SCORE: 17
ADLS_ACUITY_SCORE: 15
ADLS_ACUITY_SCORE: 17
ADLS_ACUITY_SCORE: 17

## 2018-12-09 ASSESSMENT — MIFFLIN-ST. JEOR: SCORE: 820.55

## 2018-12-09 NOTE — PLAN OF CARE
No Change  Patient Care Overview  Plan of Care/Patient Progress Review  12/9/2018 1406 - No Change by Joanne Dominguez RN  Individualization & Mutuality  12/9/2018 1406 - No Change by Joanne Dominguez RN  Discharge Needs Assessment  12/9/2018 1406 - No Change by Joanne Dominguez RN  Interdisciplinary Rounds/Family Conf  12/9/2018 1406 - No Change by Joanne Dominguez RN  Cardiac: ACS (Acute Coronary Syndrome) (Adult)  Signs and Symptoms of Listed Potential Problems Will be Absent, Minimized or Managed (Cardiac: ACS)  Description  Signs and symptoms of listed potential problems will be absent, minimized or managed by discharge/transition of care (reference Cardiac: ACS (Acute Coronary Syndrome) (Adult) CPG).   12/9/2018 1406 - No Change by Joanne Dominguez RN       VSS. Tele - SR. L) hip incision C/D/I. Ice to left hip. Up with 1. Plan: angiogram tomorrow.

## 2018-12-09 NOTE — PROGRESS NOTES
[facility]    Hospitalist Progress Note    Assessment & Plan   Marie Kline is a 86 year old female who was admitted on 12/7/2018. With chest heaviness and nausea     Problem 1: Symptoms above most likely due to stress cardiomyopathy  - most recent echocardiogram shows  reduced function (EF 30 to 35% and evidence of diastolic dysfunction)  EF was normal on 3/17/2017.  - concerns about underlying coronary artery disease so set up for an angiogram tomorrow  - has been responding nicely to IV furosemide, weight down, I&Os are negative and renal function is declining. Will reduce the frequency of the IV furosemide and monitor both BMP and BNP for tomorrow AM.    Problem 2: CLL  - Under the care of [facility] with plans for outpatient management.  - Leukocytes and hemoglobin stable but abnormal  -     Problem 3: Hypertension, on metoprolol  - Renal function  Slightly reduced due to the furosemide  - Blood pressure ok    - 4: History of COPD, on her home bronchodilators and controllers and stable    -5: Anxiety and stress.  No need for medicines at this time    -6 Hypothyroidism, treated and control is uncertain. , Her most recent TSH on 3/12/2018 was elevated at 7.50. I've reordered it for tomorrow also.    -7: hyperlipidemia, on Atorvastatin 20mg, if she has CAD, that should be increased if possible    # Pain Assessment:  Current Pain Score 12/8/2018   Patient currently in pain? yes   Pain score (0-10) 4   Pain location Hip   Pain descriptors Aching;Discomfort   Marie lopez pain level was assessed and she currently denies pain.      DVT Prophylaxis: IV heparin  Code Status: Full Code    Disposition: Expected discharge in 2-3 days once angiogram is done and fluid status is better..    Mayur Gallardo MD    Interval History   Mrs. Kline is feeling better. The nausea and chest  Heaviness are resolved. She notes that her left leg is still swollen.  This is the leg of her recently fractured hip and the   Ultrasound was negative for DVT.  Her breathing is better but she still needs the oxygen.  No headache, bowels and bladder are working ok, no abdominal pain.  She is anticipating an angiogram tomorrow and likes Dr. Mullen, has a great deal of reinier in his judgement and clinical skills  No headaches or light headedness. Eating ok    -Data reviewed today: I reviewed all new labs and imaging results over the last 24 hours. I personally reviewed no images or EKG's today.    Physical Exam   Temp: 97.8  F (36.6  C) Temp src: Oral BP: 92/52   Heart Rate: 84 Resp: 18 SpO2: 95 % O2 Device: Nasal cannula Oxygen Delivery: 1 LPM  Vitals:    12/08/18 0016 12/08/18 0154 12/09/18 0705   Weight: 48.8 kg (107 lb 9.6 oz) 48.8 kg (107 lb 9.6 oz) 47.5 kg (104 lb 11.2 oz)     Vital Signs with Ranges  Temp:  [97.8  F (36.6  C)-99.5  F (37.5  C)] 97.8  F (36.6  C)  Heart Rate:  [84-96] 84  Resp:  [16-22] 18  BP: (92-99)/(52-69) 92/52  SpO2:  [93 %-97 %] 95 %  I/O last 3 completed shifts:  In: 640 [P.O.:640]  Out: 1700 [Urine:1700]    Constitutional: Alert, a bit quick in her movements and seems to have some trouble with short term memory.  Respiratory: Clear to A&P  Cardiovascular: Regular rhythm, normal S1 and S2, no S3 or murmurs, some edema at the left ankle, tender, right ankle is not swollen  GI: not tender, bowel sounds are normal  Skin/Integumen: no lesions or rashes  Other:      Medications     HEParin 700 Units/hr (12/09/18 0854)     - MEDICATION INSTRUCTIONS -         acetaminophen (TYLENOL) tablet 650 mg  650 mg Oral TID     albuterol  2.5 mg Nebulization BID     aspirin  325 mg Oral Daily     atorvastatin  20 mg Oral Daily     ferrous sulfate  325 mg Oral Daily     fluticasone-vilanterol  1 puff Inhalation Daily     furosemide  40 mg Intravenous Q12H     gabapentin  300 mg Oral BID     levothyroxine  75 mcg Oral Daily     metoprolol succinate ER  25 mg Oral Daily     senna-docusate  2 tablet Oral BID     traZODone  100  mg Oral At Bedtime       Data   Recent Labs   Lab 12/09/18  0652 12/08/18  0651 12/08/18  0212 12/07/18  2322   WBC 91.2* 81.8*  --  79.0*   HGB 9.8* 9.9*  --  9.9*   MCV 99 99  --  98    235  --  264     --   --  136   POTASSIUM 4.2  --   --  4.5   CHLORIDE 101  --   --  99   CO2 33*  --   --  32   BUN 17  --   --  13   CR 1.10*  --   --  0.82   ANIONGAP 4  --   --  5   CARLOS 8.1*  --   --  8.7   GLC 94  --   --  124*   TROPI  --  0.583* 0.739* 0.550*       Imaging:   No results found for this or any previous visit (from the past 24 hour(s)).

## 2018-12-09 NOTE — PROGRESS NOTES
"[facility] Cardiology Progress Note            Interval History:   Shortness of breath post hip surgery.  Requiring 1 L of oxygen to maintain oxygenation.  Past history of stress cardiomyopathy.  Echocardiography shows significantly reduced left ventricular systolic function.  Most likely this is again stress cardiomyopathy but she has a small troponin rise and ischemia cannot be ruled out.  She did have normal coronary arteries in 2014.  Her ejection fraction did return to normal in the past so this is a new finding.  I suspect that she also has chronic obstructive pulmonary disease and this more than anything may be the cause of her increased oxygen requirements.  She has diuresed 1.1 L net out.  However she still desaturates when she walks to the bathroom.              Medications:     No current outpatient medications on file.               Physical Exam:   Blood pressure 112/57, pulse 74, temperature 97.7  F (36.5  C), temperature source Axillary, resp. rate 20, height 1.499 m (4' 11\"), weight 47.5 kg (104 lb 11.2 oz), SpO2 90 %, not currently breastfeeding.  Rhythm: Sinus rhythm.   Constitutional:   awake, alert, cooperative, no apparent distress, and appears stated age     Neck:   thyroid not enlarged, symmetric, no tenderness and no jugular venous distension     Lungs:   Mild respiratory distress, decreased air exchange and diminished breath sounds throughout lungs     Cardiovascular:   regular rate and rhythm, normal S1 and S2, S4 present, no murmur noted and no edema            Data:          Lab Results   Component Value Date     12/09/2018     12/07/2018     11/21/2018    Lab Results   Component Value Date    CHLORIDE 101 12/09/2018    CHLORIDE 99 12/07/2018    CHLORIDE 107 11/21/2018    Lab Results   Component Value Date    BUN 17 12/09/2018    BUN 13 12/07/2018    BUN 7 11/21/2018      Lab Results   Component Value Date    POTASSIUM 4.2 12/09/2018    POTASSIUM 4.5 12/07/2018    " POTASSIUM 4.6 11/21/2018    Lab Results   Component Value Date    CO2 33 12/09/2018    CO2 32 12/07/2018    CO2 34 11/21/2018    Lab Results   Component Value Date    CR 1.10 12/09/2018    CR 0.82 12/07/2018    CR 0.98 11/21/2018        Lab Results   Component Value Date    HGB 9.8 (L) 12/09/2018    HGB 9.9 (L) 12/08/2018    HGB 9.9 (L) 12/07/2018     Lab Results   Component Value Date     12/09/2018     12/08/2018     12/07/2018     Lab Results   Component Value Date    TROPONIN 0.00 06/13/2016    TROPONIN 1.22 (HH) 10/08/2014    TROPONIN 0.93 (HH) 10/08/2014    TROPI 0.583 (HH) 12/08/2018    TROPI 0.739 (HH) 12/08/2018    TROPI 0.550 (HH) 12/07/2018     Lab Results   Component Value Date    WBC 91.2 (HH) 12/09/2018    WBC 81.8 (HH) 12/08/2018    WBC 79.0 (HH) 12/07/2018                   Assessment and Plan:   Assessment:   Problem List    NSTEMI (non-ST elevated myocardial infarction) (H)  Probable recurrence of stress cardiomyopathy.  Suspect that a significant portion of her hypoxia is due to COPD with physical examination showing diminished intensity of the breath sounds and hyperinflation of the chest and a long history of smoking.    * No resolved hospital problems. *       Plan:   Coronary angiogram tomorrow for significantly reduced left ventricular systolic function which is a new finding from 2017.  Risks and benefits of already been explained to the patient yesterday.       Attestation:  I have reviewed today's vital signs, notes, medications, labs and imaging.  Care coordination / counseling time: 20 minutes  Total time: 30 minutes         Paul Mullen MD, MD 12/9/2018  9:44 AM

## 2018-12-09 NOTE — PLAN OF CARE
PT: Chart reviewed. Per cardiologist; pt appropriate for mobility prior to angiogram. Spoke with pt who reports she has been mobilizing hallway distances with RN staff (last PM) with her walker. She reports she is agreeable to do this 3x today. Per chart, pt is planned to have a coronary angiogram Monday 12/10. Will hold PT evaluation at this time in case pt requires additional CR education post angiogram.

## 2018-12-09 NOTE — PLAN OF CARE
The EMR was down for 6 hours on 12/9/2018.    JEROMY Fernandez was responsible for completing the paper charting during this time period.     The following information was re-entered into the system by Georgina Mcgill: Flowsheet data, Intake and output and MAR    The following information will remain in the paper chart: RAVEN Mcgill  12/9/2018

## 2018-12-09 NOTE — CONSULTS
Care Transition Initial Assessment - RN        Met with: Patient and dtr Margaret.  DATA   Active Problems:    NSTEMI (non-ST elevated myocardial infarction) (H)       Cognitive Status: awake, alert and oriented.  Primary Care Clinic Name: [facility]   Primary Care MD Name: Dr. Kiser  Contact information and PCP information verified: Yes  Lives With: facility resident        Description of Support System: Supportive, Involved   Who is your support system?: Children   Support Assessment: Adequate family and caregiver support   Insurance concerns: No Insurance issues identified  ASSESSMENT  Patient currently receives the following services:  Pt lives at [facility], she is open to [place] home care RN/PT/OT/HHA. She ambulates w/ [facility] at baseline. Dtr Margaret transports her to appointments. She gets 20 meals/month at [employer] and otherwise she cooks in her apartment. She is on O2 at 1LPM at baseline, this is through [employer].        Identified issues/concerns regarding health management: Met w/ pt at bedside to discuss CHF. Pt reports that she does not follow a special diet, reviewed low sodium diet. She does not add salt while cooking but does occasional add salt to her meals, discussed alternatives to salt. She has a scale and does check her weights daily, she keeps a log of them. Reviewed CHF action plan and signs/symptoms to watch for and when to call provider, pt is receptive and understanding of this information.     Pt recently fractured her hip and was at [facility], she discharge to home w/ home care on 12/2.     Will send hand off to clinic CC to follow-up w/ pt.     PLAN  Patient given options and choices for discharge Yes.  Patient/family is agreeable to the plan?  Yes  Patient anticipates discharging to home w/ home care .        Patient anticipates needs for home equipment: No  Plan/Disposition: Home   Appointments: Pt prefers to have dtr schedule.       Care   (CTS) will continue to follow as needed.    Tahmina Larios RN BSN CTS   [phone]  Care Transitions Team  [facility]

## 2018-12-09 NOTE — PLAN OF CARE
Orientation: Alert and oriented x4. Awake, alert, cooperative, no apparent distress.  VSS. 99% on RA. Afebrile.  IVF: Hep gtt infusing 7.0 ml/hr  Tele: SR. HR 80s.   LS: Clear to auscultation bilaterally but diminished throughout, No wheezing  GI: No BM. Denies N/V.  Normal bowel sounds, soft, non-distended, non-tender  : Adequate urine output. Clear yellow.   Skin: bruising noted, Wound on coccyx, BLE dressings CDI, Skin otherwise intact. No rashes, no cyanosis, dry to touch  Activity: SBA to assist of 1. Pt slept comfortably throughout shift.   Pain: C/o hip discomfort. Cold pack applied. Pt sleeping.   DC Plan: Continue with current cares. lexiscan on Monday. Discharge 2-3 days pending cardiology

## 2018-12-09 NOTE — PROGRESS NOTES
"[place] [facility] Progress Note          Assessment and Plan:   86 year-old with CLL admitted for acute diastolic CHF exacerbation.     1. CLL, not in remission  - Has had increasing lymphocytosis and anemia slightly less than 10 g/dL in addition to splenomegaly.  - Review of clinic notes show that patient has had discussion to delay initiation of CLL treatment until January due to trips over the holiday.  - Will defer treatment discussion to Dr. Ivory for recovery from her CHF exacerbation     2. Anemia related to CLL disease progression  - As noted above, patient will discuss treatment options with Dr. Ivory further after recovery from NSTEMI  - Continue serial CBC monitoring.     3. NSTEMI, stress cardiomyopathy with related dyspnea and dizziness  - Cardiology following. Coronary angiogram recommended by Cardiology.  - Continue diuresis and metoprolol.     Full code               Interval History:   Pt found to have stress cardiomyopathy on echo.              Review of Systems:   As per subjective, otherwise 5 systems reviewed and negative.           Physical Exam:   Blood pressure 92/52, pulse 74, temperature 97.8  F (36.6  C), temperature source Oral, resp. rate 18, height 1.499 m (4' 11\"), weight 47.5 kg (104 lb 11.2 oz), SpO2 96 %, not currently breastfeeding.      Vital Sign Ranges  Temperature Temp  Av.7  F (37.1  C)  Min: 97.8  F (36.6  C)  Max: 99.5  F (37.5  C)   Blood pressure Systolic (24hrs), Av , Min:92 , Max:99        Diastolic (24hrs), Av, Min:52, Max:69      Pulse No Data Recorded   Respirations Resp  Av.7  Min: 16  Max: 22   Pulse oximetry SpO2  Av.3 %  Min: 93 %  Max: 97 %         Intake/Output Summary (Last 24 hours) at 2018 0736  Last data filed at 2018 0706  Gross per 24 hour   Intake 840 ml   Output 1700 ml   Net -860 ml     GENERAL: No acute distress.           Medications:     No current outpatient medications on file.                Data:     Results " "for orders placed or performed during the hospital encounter of 18 (from the past 24 hour(s))   Glucose by meter   Result Value Ref Range    Glucose 162 (H) 70 - 99 mg/dL   Echocardiogram Complete    Narrative    [id number]  [facility]  [id number]  [id number]^CARRIE^MARIEL^LIAM           [facility]  Echocardiography Laboratory  [address] [place] [address]        Name: MAGGI RODRIGUEZ  MRN: [id number]  : 1932  Study Date: 2018 09:39 AM  Age: 86 yrs  Gender: Female  Patient Location: [facility]  Reason For Study: MI  Ordering Physician: MARIEL BUTLER  Referring Physician: Piper Kiser  Performed By: Ramirez Tony RDCS     BSA: 1.4 m2  Height: 59 in  Weight: 107 lb  HR: 74  BP: 128/76 mmHg  _____________________________________________________________________________  __        Procedure  Complete Portable Echo Adult.  _____________________________________________________________________________  __        Interpretation Summary     The visual ejection fraction is estimated at 30-35%.  Left ventricular systolic function is moderately reduced.  The wall motion abnormalities are consistent with \"reverse\" stress  cardiomyopthy (Tako Tsubo cardiomyopathy) where the apex and basal function  are preserved with the mid portions of the left ventricular myocardium  involved. The study was technically difficult.  _____________________________________________________________________________  __        Left Ventricle  The left ventricle is normal in size. There is normal left ventricular wall  thickness. The visual ejection fraction is estimated at 30-35%. Left  ventricular systolic function is moderately reduced. Diastolic Doppler  findings (E/E' ratio and/or other parameters) suggest left ventricular filling  pressures are increased. Severe hypokinesis of the mid to distal inferoseptal  wall. Severe hypokinesis of the mid anteroseptal wall. Severe hypokinesis of  the mid to distal inferior " and mid to distal anterior wall. Severe hypokinesis  of the mid inferolateral wall.     Right Ventricle  The right ventricle is normal size. The mid to distal right ventricular free  wall appears severely hypokinetic.     Atria  The left atrium is mildly dilated. Right atrial size is normal. There is no  color Doppler evidence of an atrial shunt.     Mitral Valve  There is trace mitral regurgitation.        Tricuspid Valve  There is mild (1+) tricuspid regurgitation. The right ventricular systolic  pressure is approximated at 34.3 mmHg plus the right atrial pressure.     Aortic Valve  The aortic valve is not well visualized. There is mild (1+) aortic  regurgitation. No hemodynamically significant valvular aortic stenosis.     Pulmonic Valve  There is mild (1+) pulmonic valvular regurgitation.     Vessels  The ascending aorta is Borderline dilated. The inferior vena cava is not  dilated.     Pericardium  There is no pericardial effusion.        Rhythm  Sinus rhythm was noted.  _____________________________________________________________________________  __  MMode/2D Measurements & Calculations  IVSd: 0.88 cm     LVIDd: 4.5 cm  LVIDs: 2.8 cm  LVPWd: 0.70 cm  FS: 38.7 %  LV mass(C)d: 112.9 grams  LV mass(C)dI: 79.9 grams/m2  Ao root diam: 3.0 cm  LA dimension: 3.2 cm  asc Aorta Diam: 3.4 cm  LA/Ao: 1.1  LVOT diam: 2.0 cm  LVOT area: 3.2 cm2  LA Volume (BP): 45.5 ml  LA Volume Index (BP): 32.3 ml/m2  RWT: 0.31           Doppler Measurements & Calculations  MV E max eusebio: 97.5 cm/sec  MV A max eusebio: 71.8 cm/sec  MV E/A: 1.4  MV max PG: 3.4 mmHg  MV mean P.0 mmHg  MV V2 VTI: 25.5 cm  MVA(VTI): 3.1 cm2  MV dec time: 0.19 sec  AI P1/2t: 437.0 msec  LV V1 max P.6 mmHg  LV V1 max: 118.8 cm/sec  LV V1 VTI: 24.7 cm  CO(LVOT): 6.3 l/min  CI(LVOT): 4.5 l/min/m2  SV(LVOT): 77.9 ml  SI(LVOT): 55.1 ml/m2  PA acc time: 0.10 sec  PI end-d eusebio: 148.0 cm/sec  TR max eusebio: 292.8 cm/sec  TR max P.3 mmHg  E/E' av.6  Lateral  E/e': 10.1  [facility] E/e': 19.1              _____________________________________________________________________________  __        Report approved by: Marcela Valdes 12/08/2018 03:08 PM      Glucose by meter   Result Value Ref Range    Glucose 131 (H) 70 - 99 mg/dL   Heparin 10a Level   Result Value Ref Range    Heparin 10A Level 0.12 IU/mL   Heparin 10a Level   Result Value Ref Range    Heparin 10A Level 0.29 IU/mL   Lactic acid level STAT for sepsis protocol   Result Value Ref Range    Lactate for Sepsis Protocol 0.6 (L) 0.7 - 2.0 mmol/L   Heparin 10a Level   Result Value Ref Range    Heparin 10A Level 0.23 IU/mL   Basic metabolic panel   Result Value Ref Range    Sodium 138 133 - 144 mmol/L    Potassium 4.2 3.4 - 5.3 mmol/L    Chloride 101 94 - 109 mmol/L    Carbon Dioxide 33 (H) 20 - 32 mmol/L    Anion Gap 4 3 - 14 mmol/L    Glucose 94 70 - 99 mg/dL    Urea Nitrogen 17 7 - 30 mg/dL    Creatinine 1.10 (H) 0.52 - 1.04 mg/dL    GFR Estimate 47 (L) >60 mL/min/1.7m2    GFR Estimate If Black 57 (L) >60 mL/min/1.7m2    Calcium 8.1 (L) 8.5 - 10.1 mg/dL

## 2018-12-10 LAB
ANION GAP SERPL CALCULATED.3IONS-SCNC: 5 MMOL/L (ref 3–14)
BUN SERPL-MCNC: 15 MG/DL (ref 7–30)
CALCIUM SERPL-MCNC: 7.6 MG/DL (ref 8.5–10.1)
CHLORIDE SERPL-SCNC: 98 MMOL/L (ref 94–109)
CO2 SERPL-SCNC: 31 MMOL/L (ref 20–32)
CREAT SERPL-MCNC: 1.08 MG/DL (ref 0.52–1.04)
GFR SERPL CREATININE-BSD FRML MDRD: 48 ML/MIN/1.7M2
GLUCOSE SERPL-MCNC: 95 MG/DL (ref 70–99)
LACTATE BLD-SCNC: 0.6 MMOL/L (ref 0.7–2)
LMWH PPP CHRO-ACNC: 0.13 IU/ML
LMWH PPP CHRO-ACNC: 0.22 IU/ML
NT-PROBNP SERPL-MCNC: 5267 PG/ML (ref 0–1800)
POTASSIUM SERPL-SCNC: 4.3 MMOL/L (ref 3.4–5.3)
SODIUM SERPL-SCNC: 134 MMOL/L (ref 133–144)

## 2018-12-10 PROCEDURE — 25000128 H RX IP 250 OP 636: Performed by: INTERNAL MEDICINE

## 2018-12-10 PROCEDURE — 36415 COLL VENOUS BLD VENIPUNCTURE: CPT | Performed by: INTERNAL MEDICINE

## 2018-12-10 PROCEDURE — 94640 AIRWAY INHALATION TREATMENT: CPT

## 2018-12-10 PROCEDURE — 99233 SBSQ HOSP IP/OBS HIGH 50: CPT | Performed by: INTERNAL MEDICINE

## 2018-12-10 PROCEDURE — 12000007 ZZH R&B INTERMEDIATE

## 2018-12-10 PROCEDURE — 85520 HEPARIN ASSAY: CPT | Performed by: INTERNAL MEDICINE

## 2018-12-10 PROCEDURE — 25000125 ZZHC RX 250: Performed by: INTERNAL MEDICINE

## 2018-12-10 PROCEDURE — 40000275 ZZH STATISTIC RCP TIME EA 10 MIN

## 2018-12-10 PROCEDURE — 94640 AIRWAY INHALATION TREATMENT: CPT | Mod: 76

## 2018-12-10 PROCEDURE — A9270 NON-COVERED ITEM OR SERVICE: HCPCS | Mod: GY | Performed by: INTERNAL MEDICINE

## 2018-12-10 PROCEDURE — G0463 HOSPITAL OUTPT CLINIC VISIT: HCPCS

## 2018-12-10 PROCEDURE — 25000132 ZZH RX MED GY IP 250 OP 250 PS 637: Mod: GY | Performed by: INTERNAL MEDICINE

## 2018-12-10 PROCEDURE — 83880 ASSAY OF NATRIURETIC PEPTIDE: CPT | Performed by: INTERNAL MEDICINE

## 2018-12-10 PROCEDURE — 80048 BASIC METABOLIC PNL TOTAL CA: CPT | Performed by: INTERNAL MEDICINE

## 2018-12-10 PROCEDURE — 99231 SBSQ HOSP IP/OBS SF/LOW 25: CPT | Performed by: INTERNAL MEDICINE

## 2018-12-10 PROCEDURE — 83605 ASSAY OF LACTIC ACID: CPT | Performed by: INTERNAL MEDICINE

## 2018-12-10 RX ORDER — DIPHENHYDRAMINE HYDROCHLORIDE 50 MG/ML
25 INJECTION INTRAMUSCULAR; INTRAVENOUS ONCE
Status: COMPLETED | OUTPATIENT
Start: 2018-12-10 | End: 2018-12-11

## 2018-12-10 RX ORDER — METHYLPREDNISOLONE SODIUM SUCCINATE 125 MG/2ML
125 INJECTION, POWDER, LYOPHILIZED, FOR SOLUTION INTRAMUSCULAR; INTRAVENOUS ONCE
Status: COMPLETED | OUTPATIENT
Start: 2018-12-10 | End: 2018-12-11

## 2018-12-10 RX ORDER — SODIUM CHLORIDE 9 MG/ML
INJECTION, SOLUTION INTRAVENOUS CONTINUOUS
Status: DISCONTINUED | OUTPATIENT
Start: 2018-12-10 | End: 2018-12-11 | Stop reason: HOSPADM

## 2018-12-10 RX ORDER — POTASSIUM CHLORIDE 1500 MG/1
20 TABLET, EXTENDED RELEASE ORAL
Status: DISCONTINUED | OUTPATIENT
Start: 2018-12-10 | End: 2018-12-11 | Stop reason: HOSPADM

## 2018-12-10 RX ORDER — LORAZEPAM 2 MG/ML
0.5 INJECTION INTRAMUSCULAR
Status: DISCONTINUED | OUTPATIENT
Start: 2018-12-10 | End: 2018-12-11 | Stop reason: HOSPADM

## 2018-12-10 RX ORDER — LEVOTHYROXINE SODIUM 88 UG/1
88 TABLET ORAL DAILY
Status: DISCONTINUED | OUTPATIENT
Start: 2018-12-11 | End: 2018-12-12 | Stop reason: HOSPADM

## 2018-12-10 RX ORDER — LORAZEPAM 0.5 MG/1
0.5 TABLET ORAL
Status: DISCONTINUED | OUTPATIENT
Start: 2018-12-10 | End: 2018-12-11 | Stop reason: HOSPADM

## 2018-12-10 RX ORDER — LIDOCAINE 40 MG/G
CREAM TOPICAL
Status: DISCONTINUED | OUTPATIENT
Start: 2018-12-10 | End: 2018-12-11 | Stop reason: HOSPADM

## 2018-12-10 RX ADMIN — GABAPENTIN 300 MG: 300 CAPSULE ORAL at 22:36

## 2018-12-10 RX ADMIN — HEPARIN SODIUM 750 UNITS/HR: 10000 INJECTION, SOLUTION INTRAVENOUS at 22:36

## 2018-12-10 RX ADMIN — METOPROLOL SUCCINATE 25 MG: 25 TABLET, EXTENDED RELEASE ORAL at 08:35

## 2018-12-10 RX ADMIN — ACETAMINOPHEN 650 MG: 325 TABLET, FILM COATED ORAL at 08:35

## 2018-12-10 RX ADMIN — TRAMADOL HYDROCHLORIDE 25 MG: 50 TABLET, FILM COATED ORAL at 00:25

## 2018-12-10 RX ADMIN — FLUTICASONE FUROATE AND VILANTEROL TRIFENATATE 1 PUFF: 100; 25 POWDER RESPIRATORY (INHALATION) at 07:43

## 2018-12-10 RX ADMIN — ACETAMINOPHEN 650 MG: 325 TABLET, FILM COATED ORAL at 18:20

## 2018-12-10 RX ADMIN — FUROSEMIDE 40 MG: 10 INJECTION, SOLUTION INTRAMUSCULAR; INTRAVENOUS at 09:36

## 2018-12-10 RX ADMIN — SODIUM CHLORIDE: 9 INJECTION, SOLUTION INTRAVENOUS at 08:12

## 2018-12-10 RX ADMIN — ASPIRIN 325 MG: 325 TABLET, DELAYED RELEASE ORAL at 08:35

## 2018-12-10 RX ADMIN — SODIUM CHLORIDE: 9 INJECTION, SOLUTION INTRAVENOUS at 12:24

## 2018-12-10 RX ADMIN — ALBUTEROL SULFATE 2.5 MG: 2.5 SOLUTION RESPIRATORY (INHALATION) at 07:43

## 2018-12-10 RX ADMIN — ATORVASTATIN CALCIUM 20 MG: 20 TABLET, FILM COATED ORAL at 08:34

## 2018-12-10 RX ADMIN — Medication 1300 UNITS: at 08:57

## 2018-12-10 RX ADMIN — GABAPENTIN 300 MG: 300 CAPSULE ORAL at 08:34

## 2018-12-10 RX ADMIN — TRAMADOL HYDROCHLORIDE 25 MG: 50 TABLET, FILM COATED ORAL at 09:35

## 2018-12-10 RX ADMIN — ACETAMINOPHEN 650 MG: 325 TABLET, FILM COATED ORAL at 04:16

## 2018-12-10 RX ADMIN — LEVOTHYROXINE SODIUM 75 MCG: 75 TABLET ORAL at 08:34

## 2018-12-10 RX ADMIN — TRAZODONE HYDROCHLORIDE 100 MG: 100 TABLET ORAL at 22:36

## 2018-12-10 RX ADMIN — ALBUTEROL SULFATE 2.5 MG: 2.5 SOLUTION RESPIRATORY (INHALATION) at 19:41

## 2018-12-10 ASSESSMENT — ACTIVITIES OF DAILY LIVING (ADL)
ADLS_ACUITY_SCORE: 17

## 2018-12-10 ASSESSMENT — MIFFLIN-ST. JEOR: SCORE: 828.26

## 2018-12-10 NOTE — PROGRESS NOTES
"Oncology/Hematology Follow Up Note:    Assessment and Plan:    86 year-old with CLL admitted for acute diastolic CHF exacerbation.     1. CLL, not in remission  - Has had increasing lymphocytosis and anemia slightly less than 10 g/dL in addition to splenomegaly.  - We have previously had discussion to delay initiation of CLL treatment until January due to trips over the holiday.  - Continue to hold treatment for now ( WBC is not higher than her baseline)  - Don't think the CLL is exacerbating her symptoms currently.     2. Anemia related to CLL disease progression  - Will continue to address as outpatient based on recovery.  - Continue serial CBC monitoring.     3. NSTEMI, stress cardiomyopathy with related dyspnea and dizziness  - Cardiology following. Coronary angiogram recommended by Cardiology for today.  - Continue diuresis and metoprolol.    4. COPD   - ongoing bronchodilators.     Full code                 Subjective:     More sad, tearful today. Wonders how long she can continue. Breathing is her big concern. Overhwelmed    Scheduled Medications:  Reviewed active medications    Labs:  CBC RESULTS:   Recent Labs   Lab Test 12/09/18  0652 12/08/18  0651 12/07/18  2322   WBC 91.2* 81.8* 79.0*   HGB 9.8* 9.9* 9.9*   HCT 32.7* 33.3* 32.8*   MCV 99 99 98    235 264       CMP  Recent Labs   Lab 12/10/18  0647 12/09/18  0652 12/08/18  0651 12/07/18  2322    138  --  136   POTASSIUM 4.3 4.2  --  4.5   CHLORIDE 98 101  --  99   CO2 31 33*  --  32   ANIONGAP 5 4  --  5   GLC 95 94  --  124*   BUN 15 17  --  13   CR 1.08* 1.10*  --  0.82   GFRESTIMATED 48* 47*  --  66   GFRESTBLACK 58* 57*  --  80   CARLOS 7.6* 8.1*  --  8.7   MAG  --   --  2.4*  --        INRNo lab results found in last 7 days.    Objective/Physical Exam:  Blood pressure 110/54, pulse 74, temperature 96.4  F (35.8  C), temperature source Oral, resp. rate 18, height 1.499 m (4' 11\"), weight 48.3 kg (106 lb 6.4 oz), SpO2 93 %, not currently " breastfeeding.  General appearance:alert, oriented, no acute distress  HEENT:sclera anicteric, no pallor, no thrush or mucositis.  Lungs:  Coarse BS apprecaited.  Abdomen: soft, NT, ND , BS normal  Ext: no edema or rashes    Isabel Ivory MD  [facility]  12/10/2018 12:32 PM

## 2018-12-10 NOTE — PROGRESS NOTES
[facility]    Cardiology Progress Note    Date of Service (when I saw the patient): 12/10/2018     Assessment & Plan   Marie Kline is a 86 year old female who was admitted on 12/7/2018 with SOB and chest pressure (had fall 11/18 resulting in L hip fx underwent ORIF, then was discharged to a TCU). Trop elevated. Echo showed decreased LVEF 30-35% with WMAs. Prior hx of a stress CM (dx 2014, with cath showing essentially normal arteries at that time) with last echo in 2017 showing preserved EF with no WMA.     1. MI, decreased LVEF. Likely recurrent stress CM, but need to rule out an ACS event.  -Cardiac cath today. Risks and benefits reviewed.     2. Hypoxia requiring O2  -Suspect that a significant portion of her hypoxia is due to COPD with physical examination showing diminished intensity of the breath sounds and hyperinflation of the chest and a long history of smoking.    Casandra Bullard PA-C      Interval History   Only complaint is of hip pain. Denies SOB (is wearing O2) and CP, is able to lie flat comfortably.    Physical Exam   Temp: 96.4  F (35.8  C) Temp src: Oral BP: 110/54   Heart Rate: 82 Resp: 18 SpO2: 93 % O2 Device: Nasal cannula with humidification Oxygen Delivery: 1 LPM  Vitals:    12/08/18 0154 12/09/18 0705 12/10/18 0428   Weight: 48.8 kg (107 lb 9.6 oz) 47.5 kg (104 lb 11.2 oz) 48.3 kg (106 lb 6.4 oz)     Vital Signs with Ranges  Temp:  [96.3  F (35.7  C)-98.2  F (36.8  C)] 96.4  F (35.8  C)  Heart Rate:  [63-88] 82  Resp:  [18-20] 18  BP: ()/(42-59) 110/54  SpO2:  [92 %-96 %] 93 %  I/O last 3 completed shifts:  In: 1341 [P.O.:1160; I.V.:181]  Out: 1300 [Urine:1300]    Constitutional    Frail, alert and oriented, in no acute distress.     Skin     warm and dry to touch    Lungs  clear to auscultation anteriorly    Cardiac  regular rhythm, S1 normal, S2 normal, no murmurs, no rubs    Abdomen     abdomen soft, bowel sounds normoactive, no  hepatosplenomegaly    Extremities and Back     no clubbing, cyanosis. No edema observed.        Neurological     no gross motor deficits noted, affect appropriate, oriented to time, person and place.      Medications     HEParin 750 Units/hr (12/10/18 0856)     - MEDICATION INSTRUCTIONS -       sodium chloride Stopped (12/10/18 0834)       acetaminophen (TYLENOL) tablet 650 mg  650 mg Oral TID     albuterol  2.5 mg Nebulization BID     aspirin  325 mg Oral Daily     atorvastatin  20 mg Oral Daily     diphenhydrAMINE  25 mg Intravenous Once     ferrous sulfate  325 mg Oral Daily     fluticasone-vilanterol  1 puff Inhalation Daily     furosemide  40 mg Intravenous QAM     gabapentin  300 mg Oral BID     levothyroxine  75 mcg Oral Daily     methylPREDNISolone  125 mg Intravenous Once     metoprolol succinate ER  25 mg Oral Daily     senna-docusate  2 tablet Oral BID     sodium chloride (PF)  3 mL Intracatheter Q8H     traZODone  100 mg Oral At Bedtime       Data   Most Recent 3 CBC's:  Recent Labs   Lab Test 12/09/18  0652 12/08/18  0651 12/07/18 2322   WBC 91.2* 81.8* 79.0*   HGB 9.8* 9.9* 9.9*   MCV 99 99 98    235 264     Most Recent 3 Troponin's:  Recent Labs   Lab Test 12/08/18  0651 12/08/18  0212 12/07/18  2322  06/13/16  1302  10/08/14  1212 10/08/14  0900   TROPI 0.583* 0.739* 0.550*   < >  --    < >  --   --    TROPONIN  --   --   --   --  0.00  --  1.22* 0.93*    < > = values in this interval not displayed.     Most Recent 3 BNP's:  Recent Labs   Lab Test 12/10/18  0647 12/07/18  2322 03/17/17  0515   NTBNPI 5,267* 9,813* 995     Most Recent Cholesterol Panel:  Recent Labs   Lab Test 03/12/18  1009   CHOL 151   LDL 84   HDL 47*   TRIG 98     Most Recent TSH and T4:  Recent Labs   Lab Test 12/09/18  0652   TSH 7.36*   T4 1.15

## 2018-12-10 NOTE — PLAN OF CARE
PRIMARY DIAGNOSIS: Non-ST Elevation Myocardial Infarction (NSTEMI)  GOALS TO BE MET BEFORE DISCHARGE:  ADLs back to baseline: No. Patient complains of Left leg discomfort & dyspnea with ambulating.    Activity and level of assistance: Up with x1 standby assistance using Roller walker & gait belt    Pain status: Improved-controlled with oral pain medications per patient comfort level.    Return to near baseline physical activity: No. Dyspnea with activity     Discharge Planner Nurse   Safe discharge environment identified: No. Dyspnea with activity & Left leg pain when ambulating.  Barriers to discharge: Yes. Patient may have Coronary Angiogram done on 12/10/2018 per Cardiology. (+) [facility] BUE/BLE. No respiratory or cardiac distress voiced or noted.

## 2018-12-10 NOTE — PLAN OF CARE
Tele-SR with inverted T waves.  NPO.  Angiogram today. Triggered sepsis, lactate was 0.6.  Left hip incision C/D/I, applied ice and had tramadol x1 and tylenol x1 for pain.  IP eval.  Hep dt at 7mL/hr.  Hep10a 0.23 with recheck in am.  1L O2 via nc with humidification stat at 94% .  EF 30-35%.  Cardiology and Hematology following.  Pt is open to [facility] RN/PT/OT/HHA

## 2018-12-10 NOTE — PROGRESS NOTES
Patient had <10 ml of blood during a nose bleed (Epistaxis) when she blew her nose tonight. Cool cloth compression against Right nostril. No active bleeding noted w/i 5 minutes post episode. Patient is on Heparin IV @ 700 Units/hr. Hep 10A level to be drawn with Am labs on 12/10/2018.

## 2018-12-10 NOTE — PROGRESS NOTES
[facility]    Hospitalist Progress Note    Assessment & Plan   Marie Kline is a 86 year old female who was admitted on 12/7/2018. Chest heaviness and nausea    Problem 1: Stress cardiomyopathy per echocardiogram  - had a similar problem 4 years ago, had a normal angiogram then  - Dr. Tristan Casey has asked for a repeat angiogram  -  Fluid overload with systolic heart failure, has diuresed some but has more to go. Tolerating the Furosemide and BMP looks ok but Na is down to 134 from 138 yesterday.    Problem 2: CLL  - under the care of [facility] with plans for outpatient mangement  - Hematology tests stable at this time but WBC and hemoglobin results are abnormal  -     Problem 3: Cough, productive  - has a barrel chest and depressed diaphragms. She clinically appears to have COPD and on Breo Ellipta 100-25mg.  - Last cigarette 14 years ago, smoked off and on  - Last spirometry 6-8 years ago and she doesn't know the results. I think bedside spirometry would be worthwhile as it would help guide management and help with prognosis    -4: Hypothyroidism, I rechecked TSH this  AM and it is 7.36.  She takes her L4 half an hour before breakfast.  The Endocrine Society about 4-5 years ago suggested a HS dosing time as L4 is not well absorbed with food. I've increased her dose from  75 to 88 micrograms and she is asked to recheck her TSH in 6 weeks with her Primary MD, Dr. Kiser    -5: Anxiety, she denies this but her daughter feels she is anxious. She thinks she is just high energy and that she gives her stress to God. Again, Dr. Kiser should address t his and an written anxiety assessment instrument maybe worthwhile.     -6: Hyperlipidemia, on Atorvastatin.  If she has CAD, that dose should be increased.    # Pain Assessment:  Current Pain Score 12/10/2018   Patient currently in pain? -   Pain score (0-10) 5   Pain location -   Pain descriptors -   Marie lopez pain level was assessed and she  currently denies pain.      DVT Prophylaxis: IV Heparin  Code Status: Full Code    Disposition: Expected discharge pending the results of her Angiogram and her clnical status.    Mayur Gallardo MD    Interval History   No further dyspnea or chest pain or nausea. Coughing a lot of mucous this AM. No headache, very anxious about the upcoming angiogram. No headaches or abdominal pains. Hungry and thirsty as she has not been able to take PO today due to upcoming angiogram.  Smoked off and on for years,  Quit 14 years ago, grew up with smokers.  No leg swelling. Bowels and bladder ok    -Data reviewed today: I reviewed all new labs and imaging results over the last 24 hours. I personally reviewed no images or EKG's today.    Physical Exam   Temp: 96.4  F (35.8  C) Temp src: Oral BP: 110/54   Heart Rate: 82 Resp: 18 SpO2: 93 % O2 Device: Nasal cannula with humidification Oxygen Delivery: 1 LPM  Vitals:    12/08/18 0154 12/09/18 0705 12/10/18 0428   Weight: 48.8 kg (107 lb 9.6 oz) 47.5 kg (104 lb 11.2 oz) 48.3 kg (106 lb 6.4 oz)     Vital Signs with Ranges  Temp:  [96.3  F (35.7  C)-98.2  F (36.8  C)] 96.4  F (35.8  C)  Heart Rate:  [63-88] 82  Resp:  [18-20] 18  BP: ()/(42-59) 110/54  SpO2:  [92 %-96 %] 93 %  I/O last 3 completed shifts:  In: 1341 [P.O.:1160; I.V.:181]  Out: 1300 [Urine:1300]    Constitutional: Alert, looks anxious  Respiratory: No wheezing, barrel chest, depressed diaphragms, no rales  Cardiovascular: regular rhythm, normal S1 and S2, no S3, no edema  GI: not tender, normal bowel sounds  Skin/Integumen: no lesions or rashe  Other:  I&Os as of this AM -2,119 ccs, Weight  Up 0.8 kgs   BNP is  5267, was 9813 3 days ago.     Medications     HEParin 750 Units/hr (12/10/18 0856)     - MEDICATION INSTRUCTIONS -       sodium chloride 50 mL/hr at 12/10/18 1224       acetaminophen (TYLENOL) tablet 650 mg  650 mg Oral TID     albuterol  2.5 mg Nebulization BID     aspirin  325 mg Oral Daily      atorvastatin  20 mg Oral Daily     diphenhydrAMINE  25 mg Intravenous Once     ferrous sulfate  325 mg Oral Daily     fluticasone-vilanterol  1 puff Inhalation Daily     furosemide  40 mg Intravenous QAM     gabapentin  300 mg Oral BID     [START ON 12/11/2018] levothyroxine  88 mcg Oral Daily     methylPREDNISolone  125 mg Intravenous Once     metoprolol succinate ER  25 mg Oral Daily     senna-docusate  2 tablet Oral BID     sodium chloride (PF)  3 mL Intracatheter Q8H     traZODone  100 mg Oral At Bedtime       Data   Recent Labs   Lab 12/10/18  0647 12/09/18  0652 12/08/18  0651 12/08/18  0212 12/07/18  2322   WBC  --  91.2* 81.8*  --  79.0*   HGB  --  9.8* 9.9*  --  9.9*   MCV  --  99 99  --  98   PLT  --  218 235  --  264    138  --   --  136   POTASSIUM 4.3 4.2  --   --  4.5   CHLORIDE 98 101  --   --  99   CO2 31 33*  --   --  32   BUN 15 17  --   --  13   CR 1.08* 1.10*  --   --  0.82   ANIONGAP 5 4  --   --  5   CARLOS 7.6* 8.1*  --   --  8.7   GLC 95 94  --   --  124*   TROPI  --   --  0.583* 0.739* 0.550*       Imaging:   No results found for this or any previous visit (from the past 24 hour(s)).

## 2018-12-10 NOTE — PROGRESS NOTES
[facility] Nurse Inpatient Adult Pressure Injury (PI) Wound Assessment     Assessment of PI(s) on pt's:   Coccyx/ BLE    Data:   Patient History:      per MD note(s):  86-year-old female who has a significant past medical history including chronic lymphocytic leukemia, hypothyroidism, COPD, bladder cancer, recent left femoral neck fracture, status post ORIF, currently oxygen dependent, who presented after chest heaviness and nausea and sweating, found to have elevated troponin and BNP and being admitted to the hospitalist.        Mattress:  Standard , Atmos Air mattress  Current pressure relieving devices:  Foam dressing and Pillows    Moisture Management:  Incontinence Protocol and Diaper    Catheter secured? Not applicable    Current Diet / Nutrition:       Orders Placed This Encounter        NPO for Medical/Clinical Reasons Except for: Meds        Combination Diet        Mariusz Assessment and sub scores:   Mariusz Score  Av.6  Min: 15  Max: 20   Labs:   Recent Labs   Lab Test 18  0652  18  1306  17  0550  16  0400   ALBUMIN  --   --   --   --  2.4*   < >  --    HGB 9.8*   < > 12.2   < > 8.3*   < > 7.5*   INR  --   --  1.00  --   --   --   --    WBC 91.2*   < > 70.7*   < > 112.8*   < > 71.9*   A1C  --   --   --   --   --   --  5.6    < > = values in this interval not displayed.                                                                                        Pressure Injury Assessment  (location #1):   Left IT   Wound History: 3 weeks ago, fall and  fx left hip     Specific Dimensions (length x width x depth, in cm) :   Intact deep red/ maroon soft skin- appears like a blood blister that became deflated, now soft and evolving    Periwound Skin: intact,      Color: normal and consistent with surrounding tissue    Temperature  normal     Drainage:  dry         Odor: none    Pain:  absent ,     Assessment  (location):   BLE multiple skin tears    Wound History:   Pt  reports falling into a glass coffee table    Specific Dimensions (length x width x depth, in cm) :   RLE- anterior 2 dry intact red scabs, posterior 2 dry intact red scab; bruised toes    LLE- anterior 1 large 5x5cm intact dry bruised scab, toes are bruised, ankle and foot 2+ edema    Periwound Skin: edema and bruising,      Drainage:  none       Odor: none    Pain:  absent , no complaints but states she want to moisturize the skin because when it gets dried out it cracks and hurts.         Intervention:     Patient's chart evaluated.      Mariusz Interventions:  Current Mariusz Interventions and Care Plan reviewed and updated, appropriate at this time.    Wound was assessed.    Wound Care: was done: Removal of existing dressing    Visual inspection; re-application of clean dressing    Education on wound care with pt,    Orders  Written    Supplies  gathered, placed at the bedside and discussed with RN    Discussed plan of care with Patient, Family and Nurse           Assessment:     Pressure Injury located on left IT: Deep tissue injury and present on admission    Status: wound  evolving and stable, Asymptomatic; will plan to use Mepilex foam and change PRN Q 3-5 days.     BLE: multiple skin tears due to traumatic injury fall 3 weeks ago. Wounds are resolving and family and pt reports they have improved. Will plan to use Sween and non adherent to cover the LLE wound.          Plan:     Nursing to notify the Provider(s) and re-consult the WOC Nurse if wound(s) deteriorate(s)or if the wound care plan needs reevaluation.    Plan for wound care to wound to IT and BLE: per POC and see MD orders    WOC Nurse will return: weekly

## 2018-12-10 NOTE — PROGRESS NOTES
"SPIRITUAL HEALTH SERVICES Progress Note  [facility] Med/Surg 3rd floor    SH consult per pt's request for  support.   Pt, Marie, shares that she suffered a heart \"problem\" over the weekend and is undergoing an angiogram later today. \"This is the second time I've suffered 'broken heart syndrome'\", Margaret then reflected on the death of her , \"Hung.\"   Marie is being visited by her dtr, Margaret, during conversation.   Marie reflected on the intersection of her reinier and her recent health challenges (hip fx, etc.). She notes that the \"last few months have been overwhelming.\"   Marie shares that \"I believe God has had a plan for me from the time I was born, I'm trying to turn all this over to Him.\" Processed together and provided emotional support as she shared her feelings of feeling overwhelmed, wonderings around meaning/reinier, and her hopes to recover.  Marie asked for prayer. Shared poem, with her permission, as a prayer for peace and calm, then prayed together.   Provided written copy of poem per her request.   Marie continues to process all that is going on in the context of her Hinduism reinier. She finds strength in prayer, family support (\"Margaret is my mukul.\"), and in conversation.   Will continue to follow through the week.     NATIVIDAD Day.  Staff    Pager #[phone]     "

## 2018-12-10 NOTE — PLAN OF CARE
PT: Eval attempted. Pt firmly declines as she has not had her procedure yet. She states she has been told to not move until after the procedure. Will continue to follow.

## 2018-12-10 NOTE — PLAN OF CARE
No Change  Patient Care Overview  Plan of Care/Patient Progress Review  12/10/2018 1411 - No Change by Joanne Dominguez RN  12/10/2018 0559 - No Change by Susan Brock RN  Cardiac: ACS (Acute Coronary Syndrome) (Adult)  Signs and Symptoms of Listed Potential Problems Will be Absent, Minimized or Managed (Cardiac: ACS)  Description  Signs and symptoms of listed potential problems will be absent, minimized or managed by discharge/transition of care (reference Cardiac: ACS (Acute Coronary Syndrome) (Adult) CPG).   12/10/2018 1411 - No Change by Joanne Dominguez RN  12/10/2018 0559 - No Change by Susan Brock RN  Adult Inpatient Plan of Care  Plan of Care Review  12/10/2018 1411 - No Change by Joanne Dominguez RN  12/10/2018 0559 - No Change by Susan Brock RN  Patient-Specific Goal (Individualization)  12/10/2018 1411 - No Change by Joanne Dominguez RN  12/10/2018 0559 - No Change by Susan Brock RN  Absence of Hospital-Acquired Illness or Injury  12/10/2018 1411 - No Change by Joanne Dominguez RN  12/10/2018 0559 - No Change by Susan Brock RN  Optimal Comfort and Wellbeing  12/10/2018 1411 - No Change by Joanne Dominguez RN  12/10/2018 0559 - No Change by Susan Brock RN  Readiness for Transition of Care  12/10/2018 1411 - No Change by Joanne Dominguez RN  12/10/2018 0559 - No Change by Susan Brock RN  Rounds/Family Conference  12/10/2018 1411 - No Change by Joanne Dominguez RN  12/10/2018 0559 - No Change by Susan Brock RN     VSS. Tele - SR. Lip Hip incision C/D/I. Ice to hip for pain. Tramadol x1. Heparin going at 7.5ml/hr. Up with 1. Plan: angiogram today. Discharge 2-3 days.

## 2018-12-10 NOTE — PROGRESS NOTES
Respiratory Therapy    Spoke with nurse in regards to bedside PFT, for physician to be able to see results they would need to be in room while doing bedside spirometry. Thursday morning at 0730 she is scheduled for a spirometry in PFT lab here at [facility], which will be interpreted and recorded. Will address again tomorrow if patient will not be here Thursday morning. Not being completed today because patient is going for coronary angio.      Na Boo RRT  12/10/2018

## 2018-12-11 LAB
ANION GAP SERPL CALCULATED.3IONS-SCNC: 3 MMOL/L (ref 3–14)
BUN SERPL-MCNC: 11 MG/DL (ref 7–30)
CALCIUM SERPL-MCNC: 7.6 MG/DL (ref 8.5–10.1)
CHLORIDE SERPL-SCNC: 102 MMOL/L (ref 94–109)
CO2 SERPL-SCNC: 30 MMOL/L (ref 20–32)
CREAT SERPL-MCNC: 0.92 MG/DL (ref 0.52–1.04)
GFR SERPL CREATININE-BSD FRML MDRD: 58 ML/MIN/1.7M2
GLUCOSE BLDC GLUCOMTR-MCNC: 130 MG/DL (ref 70–99)
GLUCOSE BLDC GLUCOMTR-MCNC: 164 MG/DL (ref 70–99)
GLUCOSE BLDC GLUCOMTR-MCNC: 96 MG/DL (ref 70–99)
GLUCOSE SERPL-MCNC: 96 MG/DL (ref 70–99)
LMWH PPP CHRO-ACNC: 0.17 IU/ML
POTASSIUM SERPL-SCNC: 4.2 MMOL/L (ref 3.4–5.3)
POTASSIUM SERPL-SCNC: 6.3 MMOL/L (ref 3.4–5.3)
SODIUM SERPL-SCNC: 135 MMOL/L (ref 133–144)

## 2018-12-11 PROCEDURE — 40000275 ZZH STATISTIC RCP TIME EA 10 MIN

## 2018-12-11 PROCEDURE — B2111ZZ FLUOROSCOPY OF MULTIPLE CORONARY ARTERIES USING LOW OSMOLAR CONTRAST: ICD-10-PCS | Performed by: INTERNAL MEDICINE

## 2018-12-11 PROCEDURE — 25000125 ZZHC RX 250: Performed by: INTERNAL MEDICINE

## 2018-12-11 PROCEDURE — 25000132 ZZH RX MED GY IP 250 OP 250 PS 637: Mod: GY | Performed by: INTERNAL MEDICINE

## 2018-12-11 PROCEDURE — 27210794 ZZH OR GENERAL SUPPLY STERILE: Performed by: INTERNAL MEDICINE

## 2018-12-11 PROCEDURE — 99233 SBSQ HOSP IP/OBS HIGH 50: CPT | Performed by: HOSPITALIST

## 2018-12-11 PROCEDURE — 40000274 ZZH STATISTIC RCP CONSULT EA 30 MIN

## 2018-12-11 PROCEDURE — A9270 NON-COVERED ITEM OR SERVICE: HCPCS | Mod: GY | Performed by: INTERNAL MEDICINE

## 2018-12-11 PROCEDURE — 27210795 ZZH PAD DEFIB QUICK CR4: Performed by: INTERNAL MEDICINE

## 2018-12-11 PROCEDURE — 93454 CORONARY ARTERY ANGIO S&I: CPT | Mod: 26 | Performed by: INTERNAL MEDICINE

## 2018-12-11 PROCEDURE — 85520 HEPARIN ASSAY: CPT | Performed by: INTERNAL MEDICINE

## 2018-12-11 PROCEDURE — 99152 MOD SED SAME PHYS/QHP 5/>YRS: CPT | Performed by: INTERNAL MEDICINE

## 2018-12-11 PROCEDURE — 93454 CORONARY ARTERY ANGIO S&I: CPT | Performed by: INTERNAL MEDICINE

## 2018-12-11 PROCEDURE — 25000128 H RX IP 250 OP 636: Performed by: INTERNAL MEDICINE

## 2018-12-11 PROCEDURE — 36415 COLL VENOUS BLD VENIPUNCTURE: CPT | Performed by: INTERNAL MEDICINE

## 2018-12-11 PROCEDURE — 80048 BASIC METABOLIC PNL TOTAL CA: CPT | Performed by: HOSPITALIST

## 2018-12-11 PROCEDURE — 00000146 ZZHCL STATISTIC GLUCOSE BY METER IP

## 2018-12-11 PROCEDURE — 25000128 H RX IP 250 OP 636

## 2018-12-11 PROCEDURE — 94640 AIRWAY INHALATION TREATMENT: CPT | Mod: 76

## 2018-12-11 PROCEDURE — 36415 COLL VENOUS BLD VENIPUNCTURE: CPT | Performed by: HOSPITALIST

## 2018-12-11 PROCEDURE — 94640 AIRWAY INHALATION TREATMENT: CPT

## 2018-12-11 PROCEDURE — C1887 CATHETER, GUIDING: HCPCS | Performed by: INTERNAL MEDICINE

## 2018-12-11 PROCEDURE — 99231 SBSQ HOSP IP/OBS SF/LOW 25: CPT | Mod: 25 | Performed by: INTERNAL MEDICINE

## 2018-12-11 PROCEDURE — C1894 INTRO/SHEATH, NON-LASER: HCPCS | Performed by: INTERNAL MEDICINE

## 2018-12-11 PROCEDURE — 12000007 ZZH R&B INTERMEDIATE

## 2018-12-11 PROCEDURE — C1769 GUIDE WIRE: HCPCS | Performed by: INTERNAL MEDICINE

## 2018-12-11 RX ORDER — VERAPAMIL HYDROCHLORIDE 2.5 MG/ML
INJECTION, SOLUTION INTRAVENOUS
Status: DISCONTINUED
Start: 2018-12-11 | End: 2018-12-11 | Stop reason: HOSPADM

## 2018-12-11 RX ORDER — ASPIRIN 81 MG/1
81 TABLET ORAL DAILY
Status: DISCONTINUED | OUTPATIENT
Start: 2018-12-12 | End: 2018-12-12 | Stop reason: HOSPADM

## 2018-12-11 RX ORDER — HYDROCODONE BITARTRATE AND ACETAMINOPHEN 5; 325 MG/1; MG/1
1-2 TABLET ORAL EVERY 4 HOURS PRN
Status: DISCONTINUED | OUTPATIENT
Start: 2018-12-11 | End: 2018-12-12 | Stop reason: HOSPADM

## 2018-12-11 RX ORDER — FUROSEMIDE 20 MG
20 TABLET ORAL DAILY
Status: DISCONTINUED | OUTPATIENT
Start: 2018-12-12 | End: 2018-12-12 | Stop reason: HOSPADM

## 2018-12-11 RX ORDER — NITROGLYCERIN 5 MG/ML
VIAL (ML) INTRAVENOUS
Status: DISCONTINUED
Start: 2018-12-11 | End: 2018-12-11 | Stop reason: HOSPADM

## 2018-12-11 RX ORDER — IOPAMIDOL 755 MG/ML
INJECTION, SOLUTION INTRAVASCULAR
Status: DISCONTINUED | OUTPATIENT
Start: 2018-12-11 | End: 2018-12-11 | Stop reason: HOSPADM

## 2018-12-11 RX ORDER — ATROPINE SULFATE 0.1 MG/ML
0.5 INJECTION INTRAVENOUS EVERY 5 MIN PRN
Status: ACTIVE | OUTPATIENT
Start: 2018-12-11 | End: 2018-12-12

## 2018-12-11 RX ORDER — LIDOCAINE HYDROCHLORIDE 10 MG/ML
INJECTION, SOLUTION EPIDURAL; INFILTRATION; INTRACAUDAL; PERINEURAL
Status: DISCONTINUED | OUTPATIENT
Start: 2018-12-11 | End: 2018-12-11 | Stop reason: HOSPADM

## 2018-12-11 RX ORDER — LIDOCAINE 40 MG/G
CREAM TOPICAL
Status: DISCONTINUED | OUTPATIENT
Start: 2018-12-11 | End: 2018-12-12 | Stop reason: HOSPADM

## 2018-12-11 RX ORDER — NITROGLYCERIN 5 MG/ML
VIAL (ML) INTRAVENOUS
Status: DISCONTINUED | OUTPATIENT
Start: 2018-12-11 | End: 2018-12-11 | Stop reason: HOSPADM

## 2018-12-11 RX ORDER — FENTANYL CITRATE 50 UG/ML
INJECTION, SOLUTION INTRAMUSCULAR; INTRAVENOUS
Status: DISCONTINUED
Start: 2018-12-11 | End: 2018-12-11 | Stop reason: HOSPADM

## 2018-12-11 RX ORDER — FLUMAZENIL 0.1 MG/ML
0.2 INJECTION, SOLUTION INTRAVENOUS
Status: ACTIVE | OUTPATIENT
Start: 2018-12-11 | End: 2018-12-12

## 2018-12-11 RX ORDER — LIDOCAINE HYDROCHLORIDE 10 MG/ML
INJECTION, SOLUTION EPIDURAL; INFILTRATION; INTRACAUDAL; PERINEURAL
Status: DISCONTINUED
Start: 2018-12-11 | End: 2018-12-11 | Stop reason: HOSPADM

## 2018-12-11 RX ORDER — METHYLPREDNISOLONE SODIUM SUCCINATE 125 MG/2ML
INJECTION, POWDER, LYOPHILIZED, FOR SOLUTION INTRAMUSCULAR; INTRAVENOUS
Status: COMPLETED
Start: 2018-12-11 | End: 2018-12-11

## 2018-12-11 RX ORDER — FENTANYL CITRATE 50 UG/ML
INJECTION, SOLUTION INTRAMUSCULAR; INTRAVENOUS
Status: DISCONTINUED | OUTPATIENT
Start: 2018-12-11 | End: 2018-12-11 | Stop reason: HOSPADM

## 2018-12-11 RX ORDER — VERAPAMIL HYDROCHLORIDE 2.5 MG/ML
INJECTION, SOLUTION INTRAVENOUS
Status: DISCONTINUED | OUTPATIENT
Start: 2018-12-11 | End: 2018-12-11 | Stop reason: HOSPADM

## 2018-12-11 RX ORDER — SODIUM CHLORIDE 9 MG/ML
INJECTION, SOLUTION INTRAVENOUS CONTINUOUS
Status: DISCONTINUED | OUTPATIENT
Start: 2018-12-11 | End: 2018-12-12 | Stop reason: HOSPADM

## 2018-12-11 RX ORDER — HEPARIN SODIUM 1000 [USP'U]/ML
INJECTION, SOLUTION INTRAVENOUS; SUBCUTANEOUS
Status: DISCONTINUED
Start: 2018-12-11 | End: 2018-12-11 | Stop reason: HOSPADM

## 2018-12-11 RX ORDER — DIPHENHYDRAMINE HYDROCHLORIDE 50 MG/ML
INJECTION INTRAMUSCULAR; INTRAVENOUS
Status: COMPLETED
Start: 2018-12-11 | End: 2018-12-11

## 2018-12-11 RX ORDER — NALOXONE HYDROCHLORIDE 0.4 MG/ML
.2-.4 INJECTION, SOLUTION INTRAMUSCULAR; INTRAVENOUS; SUBCUTANEOUS
Status: ACTIVE | OUTPATIENT
Start: 2018-12-11 | End: 2018-12-12

## 2018-12-11 RX ORDER — DIPHENHYDRAMINE HCL 25 MG
25-50 CAPSULE ORAL EVERY 6 HOURS PRN
Status: DISCONTINUED | OUTPATIENT
Start: 2018-12-11 | End: 2018-12-12 | Stop reason: HOSPADM

## 2018-12-11 RX ORDER — FENTANYL CITRATE 50 UG/ML
25-50 INJECTION, SOLUTION INTRAMUSCULAR; INTRAVENOUS
Status: ACTIVE | OUTPATIENT
Start: 2018-12-11 | End: 2018-12-12

## 2018-12-11 RX ORDER — ACETAMINOPHEN 325 MG/1
325-650 TABLET ORAL EVERY 4 HOURS PRN
Status: DISCONTINUED | OUTPATIENT
Start: 2018-12-11 | End: 2018-12-12 | Stop reason: HOSPADM

## 2018-12-11 RX ORDER — NALOXONE HYDROCHLORIDE 0.4 MG/ML
.1-.4 INJECTION, SOLUTION INTRAMUSCULAR; INTRAVENOUS; SUBCUTANEOUS
Status: DISCONTINUED | OUTPATIENT
Start: 2018-12-11 | End: 2018-12-12 | Stop reason: HOSPADM

## 2018-12-11 RX ADMIN — TRAZODONE HYDROCHLORIDE 100 MG: 100 TABLET ORAL at 21:15

## 2018-12-11 RX ADMIN — METOPROLOL SUCCINATE 25 MG: 25 TABLET, EXTENDED RELEASE ORAL at 07:50

## 2018-12-11 RX ADMIN — LEVOTHYROXINE SODIUM 88 MCG: 88 TABLET ORAL at 07:51

## 2018-12-11 RX ADMIN — ALBUTEROL SULFATE 2.5 MG: 2.5 SOLUTION RESPIRATORY (INHALATION) at 08:12

## 2018-12-11 RX ADMIN — ATORVASTATIN CALCIUM 20 MG: 20 TABLET, FILM COATED ORAL at 07:51

## 2018-12-11 RX ADMIN — ACETAMINOPHEN 650 MG: 325 TABLET, FILM COATED ORAL at 21:15

## 2018-12-11 RX ADMIN — GABAPENTIN 300 MG: 300 CAPSULE ORAL at 21:15

## 2018-12-11 RX ADMIN — TRAMADOL HYDROCHLORIDE 25 MG: 50 TABLET, FILM COATED ORAL at 01:22

## 2018-12-11 RX ADMIN — GABAPENTIN 300 MG: 300 CAPSULE ORAL at 07:50

## 2018-12-11 RX ADMIN — ALBUTEROL SULFATE 2.5 MG: 2.5 SOLUTION RESPIRATORY (INHALATION) at 19:19

## 2018-12-11 RX ADMIN — ACETAMINOPHEN 650 MG: 325 TABLET, FILM COATED ORAL at 16:09

## 2018-12-11 RX ADMIN — METHYLPREDNISOLONE SODIUM SUCCINATE 125 MG: 125 INJECTION INTRAMUSCULAR; INTRAVENOUS at 11:50

## 2018-12-11 RX ADMIN — SODIUM CHLORIDE: 9 INJECTION, SOLUTION INTRAVENOUS at 14:01

## 2018-12-11 RX ADMIN — FLUTICASONE FUROATE AND VILANTEROL TRIFENATATE 1 PUFF: 100; 25 POWDER RESPIRATORY (INHALATION) at 08:12

## 2018-12-11 RX ADMIN — SODIUM CHLORIDE: 9 INJECTION, SOLUTION INTRAVENOUS at 09:12

## 2018-12-11 RX ADMIN — TRAMADOL HYDROCHLORIDE 25 MG: 50 TABLET, FILM COATED ORAL at 10:22

## 2018-12-11 RX ADMIN — ACETAMINOPHEN 650 MG: 325 TABLET, FILM COATED ORAL at 07:52

## 2018-12-11 RX ADMIN — METHYLPREDNISOLONE SODIUM SUCCINATE 125 MG: 125 INJECTION, POWDER, FOR SOLUTION INTRAMUSCULAR; INTRAVENOUS at 11:50

## 2018-12-11 RX ADMIN — DIPHENHYDRAMINE HYDROCHLORIDE 25 MG: 50 INJECTION INTRAMUSCULAR; INTRAVENOUS at 11:51

## 2018-12-11 RX ADMIN — DIPHENHYDRAMINE HYDROCHLORIDE 25 MG: 50 INJECTION, SOLUTION INTRAMUSCULAR; INTRAVENOUS at 11:51

## 2018-12-11 RX ADMIN — ASPIRIN 325 MG: 325 TABLET, DELAYED RELEASE ORAL at 07:50

## 2018-12-11 ASSESSMENT — ACTIVITIES OF DAILY LIVING (ADL)
ADLS_ACUITY_SCORE: 15
ADLS_ACUITY_SCORE: 17
ADLS_ACUITY_SCORE: 17
ADLS_ACUITY_SCORE: 15

## 2018-12-11 ASSESSMENT — PAIN DESCRIPTION - DESCRIPTORS
DESCRIPTORS: ACHING
DESCRIPTORS: ACHING

## 2018-12-11 NOTE — PROGRESS NOTES
Oncology/Hematology Follow Up Note:    Assessment and Plan:    86 year-old with CLL admitted for acute diastolic CHF exacerbation.     1. CLL, not in remission  - Has had increasing lymphocytosis and anemia slightly less than 10 g/dL in addition to splenomegaly.  - We have previously had discussion to delay initiation of CLL treatment until January due to trips over the holiday.  - Continue to hold treatment for now ( WBC is not higher than her baseline)  - Don't think the CLL is exacerbating her symptoms currently.     2. Anemia related to CLL disease progression  - Will continue to address as outpatient based on recovery.  - Continue serial CBC monitoring.     3. NSTEMI, stress cardiomyopathy with related dyspnea and dizziness  - Cardiology following. Coronary angiogram recommended by Cardiology for today.  - Continue diuresis and metoprolol.     4. COPD   - ongoing bronchodilators.     Full code  D/w team.  Subjective:    Disappointed that she didn't have her angiogram yesterday. She is hungry. Breathing is about the same.        Labs:  All labs reviewed    CBC  Recent Labs   Lab 12/09/18  0652 12/08/18  0651 12/07/18  2322   WBC 91.2* 81.8* 79.0*   HGB 9.8* 9.9* 9.9*   MCV 99 99 98    235 264       CMP  Recent Labs   Lab 12/11/18  0852 12/10/18  0647 12/09/18  0652 12/08/18  0651 12/07/18  2322   NA PENDING 134 138  --  136   POTASSIUM PENDING 4.3 4.2  --  4.5   CHLORIDE 102 98 101  --  99   CO2 30 31 33*  --  32   ANIONGAP PENDING 5 4  --  5   GLC 96 95 94  --  124*   BUN 11 15 17  --  13   CR 0.92 1.08* 1.10*  --  0.82   GFRESTIMATED 58* 48* 47*  --  66   GFRESTBLACK 70 58* 57*  --  80   CARLOS 7.6* 7.6* 8.1*  --  8.7   MAG  --   --   --  2.4*  --        INRNo lab results found in last 7 days.    Blood CultureNo results for input(s): CULT in the last 168 hours.      Isabel Ivory MD  [facility]  12/11/2018 9:54 AM

## 2018-12-11 NOTE — PROVIDER NOTIFICATION
0716: Do you want AM labs? No order currently.    0957: FYI K 6.3, per lab hemolysis occurred. Lab will re-draw.   Addendum: Ok to re-draw, but no intervention if remains elevated per MD. High K due to CLL. Recheck 4.2.

## 2018-12-11 NOTE — PROGRESS NOTES
"SPIRITUAL HEALTH SERVICES Progress Note  [facility] Med/Surg 3rd floor    SH f/u per plan of care. Met with pt, Marie, who shares that she did not undergo angiogram because \"they missed my paperwork.\" Pt notes that she was disappointed that this test did not occur yesterday. She is scheduled for 12 pm today.   Marie goes on to reflect on how challenging her physical condition has been over the past month and that she is not the \"pepper-pot\" person that she used to be. Marie notes that \"sometimes it makes me feel hopeless.\" Provided supportive listening/emotional support as we wondered together where God is when we feel hopeless. Pt then invited prayer and we prayed together. Will continue to follow.     NATIVIDAD Day.  Staff    Pager #[phone]     "

## 2018-12-11 NOTE — PLAN OF CARE
Tele-SRl.  VSS.  NPO.  Yesterdays angio was missed so angio is planned for today.  Possible discharge pending angiogram.  Hep at 7.5mL/hr.  Ice pack for left hip pain and tramadol 25mg x1.  Right buttocks blister has foam dressing in tract.  1L O2 92%.  Hep10a was 0.22 and will be rechecked in am.

## 2018-12-11 NOTE — PROGRESS NOTES
[facility]    Cardiology Progress Note    Date of Service (when I saw the patient): 12/11/2018     Assessment & Plan   Marie Kline is a 86 year old female with history of stress cardiomyopathy in 2014, essentially normal coronaries at that time, CLL (not in remission), hypothyroidism, hyperlipidemia, recent fall 11/18 resulting in L hip fx underwent ORIF, then was discharged to a TCU, who was admitted on 12/7/2018 with SOB and chest pressure. Trop elevated. Echo showed decreased LVEF 30-35% with WMAs. Prior hx of a stress CM (dx 2014, with cath showing essentially normal arteries at that time), last echo in 2017 showing preserved EF with no WMA.     1. NSTEMI, decreased LVEF 30-35%. Likely recurrent stress CM, but need to rule out an ACS event.  -Cardiac cath today.    *Risks and benefits reviewed. Consent signed.    *No clear CI to KATHY.    *She is NPO   *She has a prior h/o CT dye allergy in 2016, having hives.  She denies reaction to CT dye otherwise.  She has Benadryl and IV Solu medrol ordered.   -Home med Toprol XL 25 mg is continued.  BP is low 100s.  Add low dose ACEi/ARB as able     2. Hypoxia requiring O2  -Suspect that a significant portion of her hypoxia is due to COPD with physical examination showing diminished intensity of the breath sounds and hyperinflation of the chest and a long history of smoking.    3.  CLL  -Per oncology      Tomi Hurd PA-C      Interval History   Complaint is of hip pain and some fatigue.  Breathing is ok - is on O2 but not feeling SOB.  No CP.   Able to lie flat comfortably.    Physical Exam   Temp: 98.7  F (37.1  C) Temp src: Oral BP: 106/52 Pulse: 87 Heart Rate: 93 Resp: 22 SpO2: 92 % O2 Device: Nasal cannula with humidification Oxygen Delivery: 1 LPM  Vitals:    12/08/18 0154 12/09/18 0705 12/10/18 0428   Weight: 48.8 kg (107 lb 9.6 oz) 47.5 kg (104 lb 11.2 oz) 48.3 kg (106 lb 6.4 oz)     Vital Signs with Ranges  Temp:  [96.8  F (36  C)-98.7  F (37.1   C)] 98.7  F (37.1  C)  Pulse:  [84-87] 87  Heart Rate:  [88-93] 93  Resp:  [16-22] 22  BP: (103-137)/(50-66) 106/52  FiO2 (%):  [92 %] 92 %  SpO2:  [91 %-94 %] 92 %  I/O last 3 completed shifts:  In: 559 [P.O.:240; I.V.:319]  Out: 1650 [Urine:1650]    Constitutional    Frail, alert and oriented, in no acute distress.     Skin     warm and dry to touch    Lungs  Decreased air movement, on O2    Cardiac  regular rhythm, S1 normal, S2 normal, no murmurs, no rubs, no JVD    Abdomen     abdomen soft, bowel sounds normoactive, no hepatosplenomegaly    Extremities and Back     no clubbing, cyanosis. No edema observed.        Neurological     no gross motor deficits noted, affect appropriate, oriented to time, person and place.      Medications     HEParin 750 Units/hr (12/11/18 0718)     - MEDICATION INSTRUCTIONS -       sodium chloride 50 mL/hr at 12/10/18 1224       acetaminophen (TYLENOL) tablet 650 mg  650 mg Oral TID     albuterol  2.5 mg Nebulization BID     aspirin  325 mg Oral Daily     atorvastatin  20 mg Oral Daily     diphenhydrAMINE  25 mg Intravenous Once     ferrous sulfate  325 mg Oral Daily     fluticasone-vilanterol  1 puff Inhalation Daily     furosemide  40 mg Intravenous QAM     gabapentin  300 mg Oral BID     levothyroxine  88 mcg Oral Daily     methylPREDNISolone  125 mg Intravenous Once     metoprolol succinate ER  25 mg Oral Daily     senna-docusate  2 tablet Oral BID     sodium chloride (PF)  3 mL Intracatheter Q8H     traZODone  100 mg Oral At Bedtime       Data   Most Recent 3 CBC's:  Recent Labs   Lab Test 12/09/18  0652 12/08/18  0651 12/07/18  2322   WBC 91.2* 81.8* 79.0*   HGB 9.8* 9.9* 9.9*   MCV 99 99 98    235 264     Most Recent 3 Troponin's:  Recent Labs   Lab Test 12/08/18  0651 12/08/18  0212 12/07/18  2322  06/13/16  1302  10/08/14  1212 10/08/14  0900   TROPI 0.583* 0.739* 0.550*   < >  --    < >  --   --    TROPONIN  --   --   --   --  0.00  --  1.22* 0.93*    < > = values  "in this interval not displayed.     Most Recent 3 BNP's:  Recent Labs   Lab Test 12/10/18  0647 12/07/18  2322 03/17/17  0515   NTBNPI 5,267* 9,813* 995     Most Recent Cholesterol Panel:  Recent Labs   Lab Test 03/12/18  1009   CHOL 151   LDL 84   HDL 47*   TRIG 98     Most Recent TSH and T4:  Recent Labs   Lab Test 12/09/18  0652   TSH 7.36*   T4 1.15     Echo 12/8/18:  Interpretation Summary     The visual ejection fraction is estimated at 30-35%.  Left ventricular systolic function is moderately reduced.  The wall motion abnormalities are consistent with \"reverse\" stress  cardiomyopthy (Tako Tsubo cardiomyopathy) where the apex and basal function  are preserved with the mid portions of the left ventricular myocardium  involved. The study was technically difficult.      "

## 2018-12-11 NOTE — PLAN OF CARE
PT: Received orders for evaluation and treatment.  Patient declined therapy evaluation yesterday, wanting to wait until angio was completed.  Patient is scheduled to have angio today  (was missed yesterday per RN), patient again declines PT evaluation until procedure is completed.

## 2018-12-11 NOTE — PROGRESS NOTES
[facility]    Hospitalist Progress Note    Date of Service (when I saw the patient): 12/11/2018  Provider:  Fito Carson MD     Initial presenting complaint/issue to hospital (Diagnosis): Chest pain and nausea  Assessment & Plan   Marie Kline is a 86 year old female who  has a past medical history of Arthritis, Bladder cancer, Cardiomyopathy, CLL (chronic lymphocytic leukemia), Congestive heart failure (10/24/2014), COPD (chronic obstructive pulmonary disease) , Hypertension, Hypothyroidism, and several others as noted in the past medical.  She was admitted on 12/7/2018 chest pain and nausea.  Echocardiogram compatible with a stress cardiomyopathy.    1.  Stress cardiomyopathy per echocardiogram.  2.  Chronic systolic heart failure with LVEF 30-35%, diastolic dysfunction and superimposed stress cardiomyopathy.  Fluid overload on admission, she has tolerated progressive diuresis.  3.  COPD with mild exacerbation and productive cough secondary to 1 and 2.  4.  Hypothyroidism, thyroid hormone supplement dose increased during this hospital admission from 75 to 88 mcg.  Plan to recheck TSH in 6 weeks by her primary care physician.  5.  Hyperlipidemia, on atorvastatin.  Consider HIST in case of CAD.  6.  Anxiety.    Plan.   Inpatient.  Cardiac telemetry.  N.p.o. for angiogram.  heparin drip per protocol.  Aspirin 325 mg daily.  Furosemide 40 mg IV daily.  Atorvastatin 20 mg tablet daily  Breo Ellipta 100-25 1 puff daily.  Gabapentin 300 mg 1 capsule twice a day.  Metoprolol XR 25 mg 1 tablet daily   trazodone 100 mg 1 tablet at bedtime.      # Pain Assessment:  Current Pain Score 12/11/2018   Patient currently in pain? yes   Pain score (0-10) 6   Pain location Hip   Pain descriptors Aching   Marie lopez pain level was assessed and she currently denies pain.        DVT Prophylaxis: Heparin gtt  Code Status: Full Code    Disposition: Expected discharge in 1-2 days once clinically stable, ben  completed..    Interval History   Productive cough with yellow sputum, no chest pain or shortness of breath.  No nausea.  N.p.o. and hungry.  Episode of diarrhea yesterday.  No urinary issues.    -Data reviewed today: I reviewed all new labs and imaging results over the last 24 hours. I personally reviewed the EKG tracing showing Sinus rhythm with interspersed PACs.    Physical Exam   Temp: 98.7  F (37.1  C) Temp src: Oral BP: 106/52 Pulse: 87 Heart Rate: 93 Resp: 22 SpO2: 92 % O2 Device: Nasal cannula with humidification Oxygen Delivery: 1 LPM  Vitals:    12/08/18 0154 12/09/18 0705 12/10/18 0428   Weight: 48.8 kg (107 lb 9.6 oz) 47.5 kg (104 lb 11.2 oz) 48.3 kg (106 lb 6.4 oz)     Vital Signs with Ranges  Temp:  [96.8  F (36  C)-98.7  F (37.1  C)] 98.7  F (37.1  C)  Pulse:  [84-87] 87  Heart Rate:  [88-93] 93  Resp:  [16-22] 22  BP: (103-137)/(50-66) 106/52  FiO2 (%):  [92 %] 92 %  SpO2:  [91 %-94 %] 92 %  I/O last 3 completed shifts:  In: 559 [P.O.:240; I.V.:319]  Out: 1650 [Urine:1650]    GEN:  Alert, oriented x 3, appears comfortable, NAD.  HEENT:  Normocephalic/atraumatic, no scleral icterus, no nasal discharge, mouth moist.  CV:  Regular rate and rhythm with PAC's, no murmur or JVD.  S1 + S2 noted, no S3 or S4.  LUNGS: Enlarged expiration.  Globally diminished to auscultation bilaterally without rales/rhonchi/wheezing/retractions.  Symmetric chest rise on inhalation noted.  ABD:  Active bowel sounds, soft, non-tender/non-distended.  No rebound/guarding/rigidity.  EXT:  No edema or cyanosis.  No joint synovitis noted.  SKIN:  Dry to touch, no exanthems noted in the visualized areas.       Medications     HEParin 750 Units/hr (12/11/18 0718)     - MEDICATION INSTRUCTIONS -       sodium chloride 50 mL/hr at 12/10/18 1224       acetaminophen (TYLENOL) tablet 650 mg  650 mg Oral TID     albuterol  2.5 mg Nebulization BID     aspirin  325 mg Oral Daily     atorvastatin  20 mg Oral Daily     diphenhydrAMINE  25 mg  Intravenous Once     ferrous sulfate  325 mg Oral Daily     fluticasone-vilanterol  1 puff Inhalation Daily     furosemide  40 mg Intravenous QAM     gabapentin  300 mg Oral BID     levothyroxine  88 mcg Oral Daily     methylPREDNISolone  125 mg Intravenous Once     metoprolol succinate ER  25 mg Oral Daily     senna-docusate  2 tablet Oral BID     sodium chloride (PF)  3 mL Intracatheter Q8H     traZODone  100 mg Oral At Bedtime       Data   Recent Labs   Lab 12/10/18  0647 12/09/18  0652 12/08/18  0651 12/08/18  0212 12/07/18  2322   WBC  --  91.2* 81.8*  --  79.0*   HGB  --  9.8* 9.9*  --  9.9*   MCV  --  99 99  --  98   PLT  --  218 235  --  264    138  --   --  136   POTASSIUM 4.3 4.2  --   --  4.5   CHLORIDE 98 101  --   --  99   CO2 31 33*  --   --  32   BUN 15 17  --   --  13   CR 1.08* 1.10*  --   --  0.82   ANIONGAP 5 4  --   --  5   CARLOS 7.6* 8.1*  --   --  8.7   GLC 95 94  --   --  124*   TROPI  --   --  0.583* 0.739* 0.550*       No results found for this or any previous visit (from the past 24 hour(s)).          Disclaimer: This note consists of symbols derived from keyboarding, dictation and/or voice recognition software. As a result, there may be errors in the script that have gone undetected. Please consider this when interpreting information found in this chart.

## 2018-12-11 NOTE — PLAN OF CARE
A&O X4. Up SBA with walker, gait belt. VSS on 1L NC. Denies chest pain, dizziness. Cardiology following. C/o LLE pain, PRN Tylenol and Tramadol given. Angiogram to right wrist, no interventions. CMS intact, TR band off at 1500. Rash noted, started in pre-procedural area. No interventions as patient is asymptomatic- monitoring closely. Possible discharge to TCU tomorrow.     Addendum: Spoke with Cardiology at 1440 regarding desire for Heparin drip orders. Heparin drip discontinued.

## 2018-12-11 NOTE — PROCEDURES
"Procedure    Findings  Upon arrival to cath lab, the patient had a diffuse, macular erythematous rash. She had received both IV diphenhydramine and IV hydrocortisone prior to arrival. She was free of wheeze, dyspnea, pruritis and chest pain. I felt she likely had experienced some sort of drug reaction, but did not manifest any signs of respiratory or cardiovascular reaction or anaphylaxis.     We performed coronary angiography via RTR access with no complications    Findings   Smooth very tortuous coronaries with a right dominant system.     Assess : Likely recurrent \"stress cardiomyopathy/Takotsubo\"  I am uncertain what caused her pre procedure erythematous rash and will discuss with primary care MD Carrasco  "

## 2018-12-11 NOTE — PROGRESS NOTES
D:  Per record review, pt has [facility] RN/PT/OT/HHA.  Pt's discharge is 1-2 days out.    I:  Anselmo received a voicemail from Irish Heller ([phone]) with [facility].  She stated that the pt has services with them through Medicare - RN/PT/OT/HHA.  Irish asked that the discharge orders state a resumption of HC and that they address the skin tear bilateral lower leg and the spot/blister that was on the left side of her bottom.  She also asked that discharge orders be faxed to [facility] Intake F: [phone] P: [phone].    Anselmo spoke with Irish who asked that they be informed of any discharge plans so they can make sure the pt's cares are resumed.  She stated that the pt was opened to their services last Tuesday.  Irish met with the pt on Thursday and did not suspect any cardiac issues.  She said that the pt was using O2.      A/P:  Sw will continue to be available as needed until discharge.  Anselmo will keep Irish with [facility] updated of discharge plans.

## 2018-12-11 NOTE — PLAN OF CARE
Patient A&Ox4, VSS, LS diminished, BS audible - BM 12/9. Up SBA,  cardiac diet. C/o left hip pain - managed with ice packs. BLE wounds intact. Right buttocks open blister covered with intact foam dressing. Heparin drip running at 750units per hour. Tele - SR. Plan was angiogram today, but got missed. Plan is procedure tomorrow, so NPO after midnight. Possible DC pending angiogram.

## 2018-12-11 NOTE — PRE-PROCEDURE
"I have examined the patient, reviewed the history, medications and pre procedural tests. NSTEMI with history \"takotsubo/stress cardiomyopathy \"  I have explained to the patient the risks of death, MI, stroke, hematoma, possible urgent bypass surgery for failed PCI, use of stents, thienopyridine agents, possible peripheral vascular complications, arrhythmia, the use of FFR in clinical decision-making and alternative of medical therapy alone in regards to left heart catheterization, left ventriculography, coronary angiography, and possible percutaneous coronary intervention. The patient voiced understanding and wishes to proceed. The patient has a good right radial pulse, normal ulnar pulse and a normal Michael's sign.   "

## 2018-12-12 ENCOUNTER — APPOINTMENT (OUTPATIENT)
Dept: PHYSICAL THERAPY | Facility: CLINIC | Age: 83
DRG: 286 | End: 2018-12-12
Attending: INTERNAL MEDICINE
Payer: MEDICARE

## 2018-12-12 VITALS
HEIGHT: 59 IN | DIASTOLIC BLOOD PRESSURE: 55 MMHG | WEIGHT: 106.4 LBS | RESPIRATION RATE: 16 BRPM | OXYGEN SATURATION: 95 % | HEART RATE: 85 BPM | SYSTOLIC BLOOD PRESSURE: 115 MMHG | BODY MASS INDEX: 21.45 KG/M2 | TEMPERATURE: 97.5 F

## 2018-12-12 PROBLEM — I51.81 STRESS-INDUCED CARDIOMYOPATHY: Status: ACTIVE | Noted: 2018-12-12

## 2018-12-12 LAB — GLUCOSE BLDC GLUCOMTR-MCNC: 106 MG/DL (ref 70–99)

## 2018-12-12 PROCEDURE — 25000132 ZZH RX MED GY IP 250 OP 250 PS 637: Mod: GY | Performed by: INTERNAL MEDICINE

## 2018-12-12 PROCEDURE — 00000146 ZZHCL STATISTIC GLUCOSE BY METER IP

## 2018-12-12 PROCEDURE — 99231 SBSQ HOSP IP/OBS SF/LOW 25: CPT | Performed by: INTERNAL MEDICINE

## 2018-12-12 PROCEDURE — A9270 NON-COVERED ITEM OR SERVICE: HCPCS | Mod: GY | Performed by: INTERNAL MEDICINE

## 2018-12-12 PROCEDURE — 40000193 ZZH STATISTIC PT WARD VISIT

## 2018-12-12 PROCEDURE — 97161 PT EVAL LOW COMPLEX 20 MIN: CPT | Mod: GP

## 2018-12-12 PROCEDURE — 25000125 ZZHC RX 250: Performed by: INTERNAL MEDICINE

## 2018-12-12 PROCEDURE — 99239 HOSP IP/OBS DSCHRG MGMT >30: CPT | Performed by: HOSPITALIST

## 2018-12-12 RX ADMIN — GABAPENTIN 300 MG: 300 CAPSULE ORAL at 07:50

## 2018-12-12 RX ADMIN — ACETAMINOPHEN 650 MG: 325 TABLET, FILM COATED ORAL at 01:20

## 2018-12-12 RX ADMIN — LEVOTHYROXINE SODIUM 88 MCG: 88 TABLET ORAL at 07:50

## 2018-12-12 RX ADMIN — ALBUTEROL SULFATE 2.5 MG: 2.5 SOLUTION RESPIRATORY (INHALATION) at 09:34

## 2018-12-12 RX ADMIN — FUROSEMIDE 20 MG: 20 TABLET ORAL at 07:50

## 2018-12-12 RX ADMIN — FLUTICASONE FUROATE AND VILANTEROL TRIFENATATE 1 PUFF: 100; 25 POWDER RESPIRATORY (INHALATION) at 09:34

## 2018-12-12 RX ADMIN — ASPIRIN 81 MG: 81 TABLET, COATED ORAL at 07:50

## 2018-12-12 RX ADMIN — ACETAMINOPHEN 650 MG: 325 TABLET, FILM COATED ORAL at 07:50

## 2018-12-12 RX ADMIN — ATORVASTATIN CALCIUM 20 MG: 20 TABLET, FILM COATED ORAL at 07:50

## 2018-12-12 ASSESSMENT — ACTIVITIES OF DAILY LIVING (ADL)
ADLS_ACUITY_SCORE: 15

## 2018-12-12 NOTE — PROGRESS NOTES
12/12/18 0813   Quick Adds   Type of Visit Initial PT Evaluation   Living Environment   Lives With alone   Living Arrangements apartment  (Patient lives at [facility])   Home Accessibility no concerns   Living Environment Comment Patient lives alone in senior living apartment with no concerns of stairs    Self-Care   Equipment Currently Used at Home walker, rolling;wheelchair, manual;raised toilet;shower chair;cane, straight   Activity/Exercise/Self-Care Comment Patient uses 4WW at baseline   Functional Level Prior   Ambulation 1-->assistive equipment   Transferring 1-->assistive equipment   Toileting 0-->independent   Bathing 0-->independent   Communication 0-->understands/communicates without difficulty   Swallowing 0-->swallows foods/liquids without difficulty   Cognition 0 - no cognition issues reported   Fall history within last six months yes   Number of times patient has fallen within last six months 1   Which of the above functional risks had a recent onset or change? ambulation;transferring;toileting;bathing;dressing;fall history   Prior Functional Level Comment Patient independent with all functional mobility tasks at baseline. Patient receives 20 meals/month and eats at dining room. Receives cleaning services 2x/month   General Information   Onset of Illness/Injury or Date of Surgery - Date 12/07/18   Referring Physician Lokesh Slaughter MD   Patient/Family Goals Statement return home    Pertinent History of Current Problem (include personal factors and/or comorbidities that impact the POC) Patient is an 87 y/o female who  has a PMH of Arthritis, Bladder cancer, Cardiomyopathy, CLL (chronic lymphocytic leukemia), Congestive heart failure (10/24/2014), COPD (chronic obstructive pulmonary disease) , Hypertension, Hypothyroidism, and several others as noted in the past medical.  She was admitted on 12/7/2018 for chest pain and nausea.  Echocardiogram compatible with a stress  cardiomyopathy   Precautions/Limitations fall precautions;oxygen therapy device and L/min  (1LPM NC)   Weight-Bearing Status - LLE weight-bearing as tolerated   Cognitive Status Examination   Orientation orientation to person, place and time   Level of Consciousness alert   Follows Commands and Answers Questions 100% of the time   Personal Safety and Judgment intact   Memory intact   Pain Assessment   Patient Currently in Pain Yes, see Vital Sign flowsheet   Integumentary/Edema   Integumentary/Edema other (describe)   Integumentary/Edema Comments Bruising noted on B LEs, incision on L hip   Posture    Posture Protracted shoulders;Forward head position   Range of Motion (ROM)   ROM Comment WFL - L LE < R LE   Strength   Strength Comments WFL - L LE < R LE   Bed Mobility   Bed Mobility Comments N/A at this time    Transfer Skills   Transfer Comments Sit <> stand with FWW and SBA   Gait   Gait Comments Ambulates 50' with FWW and CGA   Balance   Balance Comments Requires UE support on FWW for dynamic mobility   Sensory Examination   Sensory Perception no deficits were identified   Coordination   Coordination no deficits were identified   Muscle Tone   Muscle Tone no deficits were identified   Clinical Impression   Criteria for Skilled Therapeutic Intervention evaluation only   PT Diagnosis decreased independence with functional mobilityq   Influenced by the following impairments pain, weakness, decreased activity tolerance, O2 needs    Functional limitations due to impairments ambulation, transfers, bed mobility    Clinical Presentation Stable/Uncomplicated   Clinical Presentation Rationale Patient presenting medically stable    Clinical Decision Making (Complexity) Low complexity   Therapy Frequency` daily   Predicted Duration of Therapy Intervention (days/wks) 1 day   Anticipated Discharge Disposition Home with Home Therapy   Risk & Benefits of therapy have been explained Yes   Patient, Family & other staff in  "agreement with plan of care Yes   [facility] TM \"6 Clicks\"   2016, Trustees of [facility], under license to [employer].  All rights reserved.   6 Clicks Short Forms Basic Mobility Inpatient Short Form   [facility]  \"6 Clicks\" V.2 Basic Mobility Inpatient Short Form   1. Turning from your back to your side while in a flat bed without using bedrails? 4 - None   2. Moving from lying on your back to sitting on the side of a flat bed without using bedrails? 4 - None   3. Moving to and from a bed to a chair (including a wheelchair)? 4 - None   4. Standing up from a chair using your arms (e.g., wheelchair, or bedside chair)? 4 - None   5. To walk in hospital room? 3 - A Little   6. Climbing 3-5 steps with a railing? 3 - A Little   Basic Mobility Raw Score (Score out of 24.Lower scores equate to lower levels of function) 22   Total Evaluation Time   Total Evaluation Time (Minutes) 25     "

## 2018-12-12 NOTE — PLAN OF CARE
A&O X4. Up SBA with walker, gait belt. VSS on RA, able to wean off oxygen. Denies pain, EBNJAMIN. Tele SR. Right wrist CMS intact, no signs of bleeding. Cardiology and Hem/Onc signed off. PT signed off.     Discharged this afternoon. Belongings sent, education provided. SW to fax discharge paperwork to [facility] for home care. Follow up appointments discussed.

## 2018-12-12 NOTE — CONSULTS
Care Transition Initial Assessment - ROMEL  Reason For Consult: discharge planning  Met with: Patient    Active Problems:    NSTEMI (non-ST elevated myocardial infarction) (H)    Stress-induced cardiomyopathy       DATA  Lives With: alone at [facility].  She lives on the main level, but there are elevators if she needs them.     Description of Support System: Supportive, Involved  Who is your support system?: Children - Dtr Margaret, son-in-law Wenceslao and grandson Rico.  Support Assessment: Adequate family and caregiver support, Adequate social supports. Pt states that her family is very supportive of her and they help her out a lot.  Identified issues/concerns regarding health management: Pt was admitted for NSTEMI.         ASSESSMENT  Cognitive Status:  awake, alert and oriented  Concerns to be addressed: Pt expressed no concerns returning home.  Her dtr Margaret will transport her home at discharge.  Margaret has the pt's walker and clothes and will bring them to [facility].     Romel spoke to Crow with [facility] at [facility] who wanted an update on the pt's discharge plans.  He was informed that that pt may discharge today back to home.  He said that he will update [place] and romel will call if there are any changes.     PLAN  Romel will continue to be available as needed until discharge.  Romel will fax discharge orders to [place] [facility] Int[facility] at discharge (F:[phone] P: [phone]).

## 2018-12-12 NOTE — PROGRESS NOTES
[facility]    Cardiology Progress Note    Outpatient cardiologist: Dr. Ortega     Date of Service (when I saw the patient): 12/12/2018     Assessment & Plan   Marie Kline is a 86 year old female with history of stress cardiomyopathy in 2014, essentially normal coronaries at that time, CLL (not in remission), hypothyroidism, hyperlipidemia, recent fall 11/18 resulting in L hip fx underwent ORIF, then was discharged to a TCU, who was admitted on 12/7/2018 with SOB and chest pressure. Trop elevated. Echo showed decreased LVEF 30-35% with WMAs. Prior hx of a stress CM (dx 2014, with cath showing essentially normal arteries at that time), last echo in 2017 showing preserved EF with no WMA.     1. NSTEMI, decreased LVEF 30-35%. Recurrent stress CM.  - 12/11/18 Cardiac cath without any significant CAD, therefore c/w recurrent stress cardiomyoapthy  - Continue home med Toprol XL 25 mg.  BP is low 100s.  Unable to add ACEi/ARB at this time.    - Now on PO Lasix 20 mg  - Repeat echo in 4-6 weeks     2. Hypoxia requiring O2  -Suspect that a significant portion of her hypoxia is due to COPD with physical examination showing diminished intensity of the breath sounds and hyperinflation of the chest and a long history of smoking.    3.  CLL  -Per oncology    DISPO: Ok to discontinue from cardiology perspective.    BMP in 1-2 weeks given Lasix and cath, can be with PCP.  Follow up cardiology NAVEED in 4-6 weeks with repeat echo.       Follow up:  [place]   Echo 1/17/19 at 12:30  Tomi Hurd PA-C 1/18/19 at 1:10      Tomi Hurd PA-C      Interval History   Complaint is of hip pain and some fatigue.  Breathing is ok - is on O2 but not feeling SOB.  No CP.    Wants to go home.     Physical Exam   Temp: 97.5  F (36.4  C) Temp src: Oral BP: 115/55 Pulse: 85 Heart Rate: 71 Resp: 16 SpO2: 94 % O2 Device: Nasal cannula Oxygen Delivery: 1 LPM  Vitals:    12/08/18 0154 12/09/18 0705 12/10/18 0428   Weight: 48.8 kg (107  lb 9.6 oz) 47.5 kg (104 lb 11.2 oz) 48.3 kg (106 lb 6.4 oz)     Vital Signs with Ranges  Temp:  [95.8  F (35.4  C)-98.1  F (36.7  C)] 97.5  F (36.4  C)  Pulse:  [63-85] 85  Heart Rate:  [66-73] 71  Resp:  [16-20] 16  BP: ()/(41-55) 115/55  SpO2:  [92 %-97 %] 94 %  I/O last 3 completed shifts:  In: 3 [I.V.:3]  Out: 600 [Urine:600]    Constitutional    Frail, alert and oriented, in no acute distress.     Skin     warm and dry to touch    Lungs  Decreased air movement, on O2    Cardiac  regular rhythm, S1 normal, S2 normal, no murmurs, no rubs, no JVD    Abdomen     abdomen soft, bowel sounds normoactive, no hepatosplenomegaly    Extremities and Back     no clubbing, cyanosis. No edema observed.  RRA site with mild ecchymosis otherwise ok      Neurological     no gross motor deficits noted, affect appropriate, oriented to time, person and place.      Medications     - MEDICATION INSTRUCTIONS -       sodium chloride 75 mL/hr at 12/11/18 1401       acetaminophen (TYLENOL) tablet 650 mg  650 mg Oral TID     albuterol  2.5 mg Nebulization BID     aspirin  81 mg Oral Daily     atorvastatin  20 mg Oral Daily     ferrous sulfate  325 mg Oral Daily     fluticasone-vilanterol  1 puff Inhalation Daily     furosemide  20 mg Oral Daily     gabapentin  300 mg Oral BID     levothyroxine  88 mcg Oral Daily     metoprolol succinate ER  25 mg Oral Daily     senna-docusate  2 tablet Oral BID     sodium chloride (PF)  3 mL Intracatheter Q8H     traZODone  100 mg Oral At Bedtime       Data   Most Recent 3 CBC's:  Recent Labs   Lab Test 12/09/18  0652 12/08/18  0651 12/07/18  2322   WBC 91.2* 81.8* 79.0*   HGB 9.8* 9.9* 9.9*   MCV 99 99 98    235 264     Most Recent 3 Troponin's:  Recent Labs   Lab Test 12/08/18  0651 12/08/18  0212 12/07/18  2322  06/13/16  1302  10/08/14  1212 10/08/14  0900   TROPI 0.583* 0.739* 0.550*   < >  --    < >  --   --    TROPONIN  --   --   --   --  0.00  --  1.22* 0.93*    < > = values in this  "interval not displayed.     Most Recent 3 BNP's:  Recent Labs   Lab Test 12/10/18  0647 12/07/18  2322 03/17/17  0515   NTBNPI 5,267* 9,813* 995     Most Recent Cholesterol Panel:  Recent Labs   Lab Test 03/12/18  1009   CHOL 151   LDL 84   HDL 47*   TRIG 98     Most Recent TSH and T4:  Recent Labs   Lab Test 12/09/18  0652   TSH 7.36*   T4 1.15     Echo 12/8/18:  Interpretation Summary     The visual ejection fraction is estimated at 30-35%.  Left ventricular systolic function is moderately reduced.  The wall motion abnormalities are consistent with \"reverse\" stress  cardiomyopthy (Tako Tsubo cardiomyopathy) where the apex and basal function  are preserved with the mid portions of the left ventricular myocardium  involved. The study was technically difficult.      "

## 2018-12-12 NOTE — PLAN OF CARE
All VSS, BP soft 98/46.  LS dim and satting well on 1L NC.  Tele: SR w/PAC's.  PRN Tylenol x1 for mild pain with good results, no complaints of nausea.  Right radial PCI site c,d,I.  Plan is to discharge today to home with home care.  Continue with POC.

## 2018-12-12 NOTE — DISCHARGE SUMMARY
[facility]    Discharge Summary  Hospitalist    Date of Admission:  12/7/2018  Date of Discharge:  12/12/2018  Provider:  Fito Carson MD  Date of Service (when I last saw the patient): 12/12/18    Discharge Diagnoses   1.  Stress cardiomyopathy documented in echocardiogram and per coronary angiogram. No vascular lesions amenable to intervention. .  2.  Chronic systolic heart failure with LVEF 30-35%, diastolic dysfunction and superimposed stress cardiomyopathy.     3.  COPD with mild exacerbation and productive cough secondary to 1 and 2.  4.  Hypothyroidism, on thyroid hormone supplement.  5.  Hyperlipidemia, on atorvastatin.     6.  Anxiety.    Other medical issues:  Past Medical History:   Diagnosis Date     Arthritis     Generalized     Bladder cancer (H)      Bladder cancer (H)      Cardiomyopathy (H)      CLL (chronic lymphocytic leukemia) (H)      Congestive heart failure (H) 10/24/2014    flash pulm edema ass w/Takotsubo     COPD (chronic obstructive pulmonary disease) (H)     noc O2     Hypertension      Hypothyroid      Mumps      Myocardial infarction (H) 10/2014    Takotsubo presentation w/mild CAD     Pulmonary edema cardiac cause (H) 10/08/2014    associated w/Takotsubo ACS     Tricuspid valve disorders, 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    TR 2-3+ per echo       History of Present Illness   Marie Kline is an 86 year old female who presented with chest pain and nausea.  Please see the admission history and physical for full details.    Hospital Course      Marie Kline is a 86 year old female who has a past medical history of Arthritis, Bladder cancer, Cardiomyopathy, CLL (chronic lymphocytic leukemia), Congestive heart failure (10/24/2014), COPD (chronic obstructive pulmonary disease) , Hypertension, Hypothyroidism, and several others as noted in the past medical.  She was admitted on 12/7/2018 chest pain and nausea.  Echocardiogram compatible with a stress  cardiomyopathy.  Hospital course.  1.  Stress cardiomyopathy per echocardiogram and coronary angiogram. No vascular lesions amenable to intervention.  Cardiology has been involved in her care during the admission.  She will continue follow-up with cardiology in the outpatient clinic.  2.  Chronic systolic heart failure with LVEF 30-35%, diastolic dysfunction and superimposed stress cardiomyopathy.  Fluid overload on admission, she has tolerated progressive diuresis.  3.  COPD with mild exacerbation and productive cough secondary to 1 and 2.  4.  Hypothyroidism, thyroid hormone supplement dose, increased from 75 to 88 mcg while in the hospital.  Plan to follow-up and recheck TSH in 6 weeks by her primary care physician.  5.  Hyperlipidemia, on atorvastatin.     6.  Anxiety.    # Discharge Pain Plan:    - Patient currently has NO PAIN and is not being prescribed pain medications on discharge.      Significant Results and Procedures   Coronary angiogram. See below     Pending Results      Unresulted Labs Ordered in the Past 30 Days of this Admission     Date and Time Order Name Status Description    12/9/2018 0652 T4 free In process     10/23/2018 1139 Chromosome malignant tissue In process           Code Status   Full Code       Primary Care Physician   Piper Kiser    GEN:  Alert, oriented x 3, appears comfortable, NAD.  HEENT:  Normocephalic/atraumatic, no scleral icterus, no nasal discharge, mouth moist.  CV:  Regular rate and rhythm with PAC's, no murmur or JVD.  S1 + S2 noted, no S3 or S4.  LUNGS: Enlarged expiration.  Globally diminished to auscultation bilaterally without rales/rhonchi/wheezing/retractions.  Symmetric chest rise on inhalation noted.  ABD:  Active bowel sounds, soft, non-tender/non-distended.  No rebound/guarding/rigidity.  EXT:  No edema or cyanosis.  No joint synovitis noted.  SKIN:  Dry to touch, no exanthems noted in the visualized areas.     Discharge Disposition   Discharged to  home    Consultations This Hospital Stay   PHARMACY TO DOSE HEPARIN  CARDIOLOGY IP CONSULT  PHARMACY TO DOSE HEPARIN  PHYSICAL THERAPY ADULT IP CONSULT  HEMATOLOGY & ONCOLOGY IP CONSULT  WOUND OSTOMY CONTINENCE NURSE  IP CONSULT  CARE COORDINATOR IP CONSULT  SPIRITUAL HEALTH SERVICES IP CONSULT  CARE TRANSITION RN/SW IP CONSULT  PHARMACY IP CONSULT  PHARMACY IP CONSULT  PHARMACY DISCHARGE EDUCATION BY PHARMACIST  SMOKING CESSATION PROGRAM IP CONSULT    Time Spent on this Encounter   I, Fito FJohnathan Carson, personally saw the patient today and spent greater than 30 minutes discharging this patient.     Discharge Orders      Medication Therapy Management Referral      Follow-Up with Cardiac Advanced Practice Provider      Reason for your hospital stay    Stress cardiomyopathy     Follow-up and recommended labs and tests     Follow up with primary care provider, Piper Kiser, within 7 days for hospital follow- up.  No follow up labs or test are needed.  Cardiology and Oncology as per plan .     Activity    Your activity upon discharge: activity as tolerated     Full Code     Echocardiogram Limited    Administration of IV contrast will be tailored to this examination per the appropriate written protocol listed in the Echocardiography department Protocol Book, or by the supervising Cardiologist. This may result in an order change.    Use of contrast is at the discretion of the supervising Cardiologist.     Diet    Follow this diet upon discharge: Regular Diet Adult; Regular Diet Adult     Discharge Medications   Current Discharge Medication List      CONTINUE these medications which have NOT CHANGED    Details   Acetaminophen (TYLENOL PO) Take 650 mg by mouth 3 times daily       albuterol (2.5 MG/3ML) 0.083% neb solution Take 2.5 mg by nebulization 2 times daily      albuterol (PROAIR HFA/PROVENTIL HFA/VENTOLIN HFA) 108 (90 BASE) MCG/ACT Inhaler Inhale 2 puffs into the lungs every 6 hours as needed for wheezing  Qty:  1 Inhaler, Refills: 8    Associated Diagnoses: Intermittent asthma without complication      aspirin 325 MG EC tablet Take 1 tablet (325 mg) by mouth daily  Qty: 45 tablet, Refills: 0    Associated Diagnoses: Fracture of hip, left, closed, initial encounter (H)      atorvastatin (LIPITOR) 20 MG tablet TAKE ONE TABLET BY MOUTH ONE TIME DAILY   Qty: 90 tablet, Refills: 1    Associated Diagnoses: ACS (acute coronary syndrome) (H); Hyperlipidemia with target LDL less than 130      budesonide-formoterol (SYMBICORT) 80-4.5 MCG/ACT Inhaler Inhale 2 puffs into the lungs 2 times daily  Qty: 3 Inhaler, Refills: 1    Associated Diagnoses: COPD exacerbation (H)      denosumab (PROLIA) 60 MG/ML SOLN injection Inject 1 mL (60 mg) Subcutaneous every 6 months  Qty: 1 mL, Refills: 0    Associated Diagnoses: Senile osteoporosis; CKD (chronic kidney disease) stage 3, GFR 30-59 ml/min (H)      ferrous sulfate (IRON) 325 (65 FE) MG tablet Take by mouth daily (with breakfast)      furosemide (LASIX) 20 MG tablet TAKE ONE TABLET BY MOUTH ONE TIME DAILY   Qty: 90 tablet, Refills: 1    Associated Diagnoses: Localized edema      gabapentin (NEURONTIN) 300 MG capsule Take 300 mg by mouth 2 times daily      levothyroxine (SYNTHROID/LEVOTHROID) 75 MCG tablet TAKE ONE TABLET BY MOUTH ONE TIME DAILY   Qty: 90 tablet, Refills: 1    Associated Diagnoses: Other specified hypothyroidism      metoprolol succinate (TOPROL-XL) 25 MG 24 hr tablet Take 1 tablet (25 mg) by mouth daily  Qty: 90 tablet, Refills: 3    Associated Diagnoses: ACS (acute coronary syndrome) (H)      Multiple Vitamins-Minerals (MULTIVITAMIN GUMMIES ADULT PO) Take 2 tablets by mouth daily       ondansetron (ZOFRAN-ODT) 4 MG ODT tab Take 1 tablet (4 mg) by mouth every 6 hours as needed for nausea or vomiting  Qty: 20 tablet, Refills: 0    Associated Diagnoses: Fracture of hip, left, closed, initial encounter (H)      senna-docusate (SENOKOT-S;PERICOLACE) 8.6-50 MG per tablet Take 2  tablets by mouth 2 times daily  Qty: 40 tablet, Refills: 0    Associated Diagnoses: Fracture of hip, left, closed, initial encounter (H)      TRAMADOL HCL PO Take 50 mg by mouth every 6 hours as needed for moderate to severe pain       traZODone (DESYREL) 50 MG tablet Take 100 mg by mouth At Bedtime      order for DME Equipment being ordered: Nebulizer  Fax order to [facility] [phone]  Qty: 1 each, Refills: 0    Associated Diagnoses: COPD exacerbation (H)           Allergies   Allergies   Allergen Reactions     Diagnostic X-Ray Materials Hives     CT DYE     Contrast Dye Hives     CT DYE     Wound Dressing Adhesive      Data   Most Recent 3 CBC's:  Recent Labs   Lab Test 12/09/18  0652 12/08/18  0651 12/07/18  2322   WBC 91.2* 81.8* 79.0*   HGB 9.8* 9.9* 9.9*   MCV 99 99 98    235 264      Most Recent 3 BMP's:  Recent Labs   Lab Test 12/11/18  1002 12/11/18  0852 12/10/18  0647 12/09/18  0652   NA  --  135 134 138   POTASSIUM 4.2 6.3* 4.3 4.2   CHLORIDE  --  102 98 101   CO2  --  30 31 33*   BUN  --  11 15 17   CR  --  0.92 1.08* 1.10*   ANIONGAP  --  3 5 4   CARLOS  --  7.6* 7.6* 8.1*   GLC  --  96 95 94     Most Recent 2 LFT's:  Recent Labs   Lab Test 02/19/18 09/12/17 03/27/17  0550 03/24/17  0545   AST 21 30 23 15   ALT 15 21 27 25   ALKPHOS  --   --  87 98   BILITOTAL  --   --  0.3 0.2     Most Recent INR's and Anticoagulation Dosing History:  Anticoagulation Dose History     Recent Dosing and Labs Latest Ref Rng & Units 10/9/2014 8/24/2018    INR 0.86 - 1.14 1.21(H) 1.00        Most Recent 3 Troponin's:  Recent Labs   Lab Test 12/08/18  0651 12/08/18  0212 12/07/18  2322  06/13/16  1302  10/08/14  1212 10/08/14  0900   TROPI 0.583* 0.739* 0.550*   < >  --    < >  --   --    TROPONIN  --   --   --   --  0.00  --  1.22* 0.93*    < > = values in this interval not displayed.     Most Recent Cholesterol Panel:  Recent Labs   Lab Test 03/12/18  1009   CHOL 151   LDL 84   HDL 47*   TRIG 98      Most Recent 6 Bacteria Isolates From Any Culture (See EPIC Reports for Culture Details):  Recent Labs   Lab Test 01/30/18  1123 01/29/18  1508 01/29/18  1447 06/22/16  1221 06/16/16  0145 06/15/16  0703   CULT Moderate growth  Normal yovanny   No growth No growth Candida albicans / dubliniensis isolated Candida albicans and Candida   dubliniensis are not routinely speciated  No additional fungi cultured after 4 weeks incubation  * No growth No growth     Most Recent TSH, T4 and A1c Labs:  Recent Labs   Lab Test 12/09/18  0652  06/17/16  0400   TSH 7.36*   < >  --    T4 1.15   < >  --    A1C  --   --  5.6    < > = values in this interval not displayed.     Results for orders placed or performed during the hospital encounter of 12/07/18   Chest CT, IV contrast only - PE protocol    Narrative    CT CHEST PULMONARY EMBOLISM W CONTRAST  12/8/2018 12:53 AM    HISTORY: Chest heaviness, malignancy, elevated D-dimer.    TECHNIQUE: Scans obtained from the apices through the diaphragm with  IV contrast. 54 mL Isovue-370 injected. Radiation dose for this scan  was reduced using automated exposure control, adjustment of the mA  and/or kV according to patient size, or iterative reconstruction  technique.    COMPARISON: 9/13/2018.    FINDINGS: Evaluation of the pulmonary arterial system shows no  evidence of embolus. The thoracic aorta is calcified and tortuous.  There is no aortic aneurysm. No evidence of aortic dissection, though  opacification of the aorta is suboptimal. The heart size is normal.  There are a few mildly enlarged mediastinal lymph nodes. A pretracheal  node on image number 42 measures approximately 1.1 x 1.0 cm, similar  to the previous exam. There is no hilar or axillary lymph node  enlargement. There is scarring at the right lung apex. Prominent  interstitial markings throughout the lungs may be mild pulmonary  edema. There is a small calcified granuloma in the right lower lobe  posteriorly. There is  atelectasis and scarring at the lung bases. Mild  emphysematous disease. No pneumothorax or pleural effusion. There are  bilateral Bochdalek hernias containing fat. Images through the upper  abdomen are remarkable for splenomegaly. The spleen is not completely  included on this exam. There is degenerative disease in the spine.      Impression    IMPRESSION:  1. There is no evidence of pulmonary embolus. No aortic aneurysm. No  evidence of aortic dissection.  2. A few mildly enlarged mediastinal lymph nodes similar to the  previous exam.  3. Prominent interstitial pattern in the lungs may be pulmonary edema.  4. Splenomegaly.    BETTY DURAN MD   US Lower Extremity Venous Duplex Left    Narrative    LEFT LOWER EXTREMITY VENOUS DUPLEX ULTRASOUND  12/8/2018 3:55 AM     HISTORY: Rule out deep vein thrombosis, recent hip repair, swelling.    COMPARISON: Bilateral lower extremity venous Doppler ultrasound  3/19/2017.    FINDINGS: The deep veins in the left lower extremity are compressible  throughout. The deep veins demonstrate normal venous augmentation,  waveforms and color Doppler flow. No evidence of superficial  thrombophlebitis. Left inguinal lymph nodes are present and are likely  reactive in nature. Small cyst is incidentally noted in the right  inguinal region measuring 2 cm in diameter. This is of uncertain  significance. No associated color Doppler flow.      Impression    IMPRESSION:   1. No evidence of left lower extremity DVT or superficial  thrombophlebitis.  2. Probable reactive lymph nodes left inguinal region.  3. Incidental 2 cm cyst right inguinal region. Follow-up as clinically  warranted.    AD CONDE MD         Disclaimer: This note consists of symbols derived from keyboarding, dictation and/or voice recognition software. As a result, there may be errors in the script that have gone undetected. Please consider this when interpreting information found in this chart.

## 2018-12-12 NOTE — PLAN OF CARE
VSS on 1 LPM NC, A/OX4, SBA transfer, NSR on tele, had angiogram this AM, no abnormalities with insertion sight, heparin and IVF stopped post angiogram, pt has multiple wounds to BLE, dressings changed, rash noticed after angiogram around pt's anterior abdomen, at 2200 I noticed rash has increased to back and under arms, pt asymptomatic, no cp or SOB, afebrile, MD Jessica notified, prn benadryl available, will continue to monitor, discharge possible tomorrow w/ home care.

## 2018-12-12 NOTE — PROGRESS NOTES
Oncology/Hematology Follow Up Note:    Assessment and Plan:  86 year-old with CLL admitted for acute diastolic CHF exacerbation.     1. CLL, not in remission  - Has had increasing lymphocytosis and anemia slightly less than 10 g/dL in addition to splenomegaly.  - We have previously had discussion to delay initiation of CLL treatment until January due to trips over the holiday.  - Continue to hold treatment for now ( WBC is not higher than her baseline)  - Don't think the CLL is exacerbating her symptoms currently.     2. Anemia related to CLL disease progression  - Will continue to address as outpatient based on recovery.  - Continue serial CBC monitoring.     3. NSTEMI, stress cardiomyopathy with related dyspnea and dizziness  - Cardiology following. Coronary angiogram recommended by Cardiology no major abnormalities? stress  - Continue diuresis and metoprolol.     4. COPD   - ongoing bronchodilators.     Full code  D/w team.  Likely discharge today, has appointment with me in 3 weeks. Will arannge interim visit also once home and based on her recovery.    Subjective:    {Anxious to go home, feeling better, back to baseline, rash improved.No further nausea      Labs:  All labs reviewed    CBC  Recent Labs   Lab 12/09/18  0652 12/08/18  0651 12/07/18  2322   WBC 91.2* 81.8* 79.0*   HGB 9.8* 9.9* 9.9*   MCV 99 99 98    235 264       CMP  Recent Labs   Lab 12/11/18  1002 12/11/18  0852 12/10/18  0647 12/09/18  0652 12/08/18  0651 12/07/18  2322   NA  --  135 134 138  --  136   POTASSIUM 4.2 6.3* 4.3 4.2  --  4.5   CHLORIDE  --  102 98 101  --  99   CO2  --  30 31 33*  --  32   ANIONGAP  --  3 5 4  --  5   GLC  --  96 95 94  --  124*   BUN  --  11 15 17  --  13   CR  --  0.92 1.08* 1.10*  --  0.82   GFRESTIMATED  --  58* 48* 47*  --  66   GFRESTBLACK  --  70 58* 57*  --  80   CARLOS  --  7.6* 7.6* 8.1*  --  8.7   MAG  --   --   --   --  2.4*  --        INRNo lab results found in last 7 days.    Blood CultureNo  results for input(s): CULT in the last 168 hours.      Isabel Ivory MD  [facility]  12/12/2018 9:14 AM

## 2018-12-12 NOTE — DISCHARGE INSTRUCTIONS
Please call Dr. Kiser's office at [phone] to schedule a hospital follow-up appointment within 7-10 days of discharge. Please bring your hospital discharge instructions and any new medications with you to your appointment.    Wound Care:    Pressure Injury located on left IT: Deep tissue injury and present on admission  ? Status: wound  evolving and stable, Asymptomatic; will plan to use Mepilex foam and change PRN Q 3-5 days.     BLE: multiple skin tears due to traumatic injury fall 3 weeks ago. Wounds are resolving and family and pt reports they have improved. Will plan to use Sween and non adherent to cover the LLE wound

## 2018-12-12 NOTE — PLAN OF CARE
PT: PT orders received and eval completed. Patient is an 87 y/o female who  has a PMH of Arthritis, Bladder cancer, Cardiomyopathy, CLL (chronic lymphocytic leukemia), Congestive heart failure (10/24/2014), COPD (chronic obstructive pulmonary disease) , Hypertension, Hypothyroidism, and several others as noted in the past medical.  She was admitted on 12/7/2018 for chest pain and nausea.  Echocardiogram compatible with a stress cardiomyopathy. Patient lives alone in [facility] [address] on the first level. Patient independent with functional mobility tasks at baseline; reports receiving 20 meals/month and cleaning services 2x/month from living facility. Patient reports using 4WW at baseline due to previous L hip replacement.     Discharge Planner PT   Patient plan for discharge: home with home health services   Current status: PT: Patient using restroom upon arrival. Patient educated on PT role and POC; agreeable to session. Patient on 1L O2 throughout session. Patient BP prior to initiating ambulation 110/47 and SpO2 93%. After ambulation, /55 and SpO2 94%; no c/o SOB. Patient completes sit <> stand with FWW and SBA. Patient ambulates 50' with FWW and CGA; further distances limited by L hip pain. Patient seated in bedside chair at end of session with all needs in reach.   Barriers to return to prior living situation: none  Recommendations for discharge: home with home PT   Rationale for recommendations: Patient functioning below baseline; mobility limited by L hip pain and decreased activity tolerance. Once medically stable and with IP PT, anticipate that patient will meet goals for mobility in order to ensure safe and proper discharge home. No further IP PT needs needed at this time.Recommend continued skilled therapy with home PT to progress strength, endurance, decrease fall risk, and increase independence with mobility.        Entered by: Kellen Garsia 12/12/2018 8:49 AM     Physical Therapy Discharge  Summary    Reason for therapy discharge:    All goals and outcomes met, no further needs identified.    Progress towards therapy goal(s). See goals on Care Plan in Epic electronic health record for goal details.  Eval only     Therapy recommendation(s):    Continued therapy is recommended.  Rationale/Recommendations:  Continue with skilled home PT as indicated..

## 2018-12-13 ENCOUNTER — TELEPHONE (OUTPATIENT)
Dept: INTERNAL MEDICINE | Facility: CLINIC | Age: 83
End: 2018-12-13

## 2018-12-13 ENCOUNTER — TELEPHONE (OUTPATIENT)
Dept: CARDIOLOGY | Facility: CLINIC | Age: 83
End: 2018-12-13

## 2018-12-13 ENCOUNTER — PATIENT OUTREACH (OUTPATIENT)
Dept: INTERNAL MEDICINE | Facility: CLINIC | Age: 83
End: 2018-12-13

## 2018-12-13 NOTE — PROGRESS NOTES
Clinic Care Coordination Contact  Care Coordination Communication    Referral Source: IP Handoff    Clinical Data: Patient was hospitalized at [facility]  from 12/7 to 12/12.   She was admitted on 12/7/2018 chest pain and nausea.  Echocardiogram compatible with a stress cardiomyopathy.   Cardiology followed closely while IP.    CTS met with patient to review CHF education.  Per patient she does not ADD salt to diet, she checks weight daily and logs it.  CHF action plan also reviewed.    Patient resides at [facility]  She has no services, other than 20 meals per month.  Home O2 - 1L NC.  Provided by [employer].    Patient discharged home with resumption of home care services.      Home Care Contact:              Home Care Agency: [facility] RN/PT/OT BECKY                          (F:[phone] P: [phone]).              Care Coordination contacted home care: No -IP JOSIAS updated on discharged per notes.              Resumptoin of care date: 12/14/18      Plan: RN/SW Care Coordinator will await notification from home care staff informing RN/SW Care Coordinator of patients discharge plans/needs. RN/SW Care Coordinator will review chart and outreach to home care every 4 weeks and as needed.      Babs Garcia RN-BSN   Care Coordination  Phone:  [phone]  Email: [email or web address]

## 2018-12-13 NOTE — TELEPHONE ENCOUNTER
IP F/U    Date: 12/12/18  Diagnosis: Stress-induced cardiomyopathy, Nstemi (Non-St elevated myocardial infarction)  Is patient active in care coordination? Yes - Route to Care Coordination (P [id number])  Was patient in TCU? No

## 2018-12-13 NOTE — TELEPHONE ENCOUNTER
IP F/U    Date: 12/12/18  Diagnosis: Stress Induced Cardiomyopathy, Nstemi  Is patient active in care coordination? No. Potential  Was patient in TCU? No

## 2018-12-13 NOTE — PROGRESS NOTES
Hand-off for Care Transitions to Next Level of Care Provider  Name: Marie Kline  : 1932  MRN #: [id number]  Reason for Hospitalization:  NSTEMI (non-ST elevated myocardial infarction) (H)  Admit Date/Time: 2018 10:20 PM  Discharge Date: 2018    Reason for Communication Hand-off Referral: Other  Stress Cardiomyopathy    Discharge Plan:  Discharged to: Home with homecare                   Patient agreeable to post-hospital support suggestions:  Yes    Patient is on new medications:   No    MTM follow up recommended: Yes    Tel-Assurance program:  Already enrolled in a TA program    Patient will receive  HOME CARE  at:  discharge through [facility].     Concern for non-adherence with plan of care:  No    Follow-up specialty is recommended: Yes. Follow up with Telemedicine for MTM referral; also, f/u with [facility] as scheduled.     Follow-up plan:    Future Appointments   Date Time Provider Department Center   2018  1:00 [facility]   2019 12:30 PM [facility]   2019  1:10 PM Tomi Hurd PA-C [facility]     Any outstanding tests or procedures:  No. Pt has an Echocardiogram scheduled for 19 at 12:30pm.       Radiology & Cardiology Orders     Future Labs/Procedures Complete By Expires    Echocardiogram Limited  2019 (Approximate) 2019    Process Instructions:    Administration of IV contrast will be tailored to this examination per the appropriate written protocol listed in the Echocardiography department Protocol Book, or by the supervising Cardiologist. This may result in an order change.    Use of contrast is at the discretion of the supervising Cardiologist.    Comments:    Administration of IV contrast will be tailored to this examination per the appropriate written protocol listed in the Echocardiography department Protocol Book, or by the supervising Cardiologist. This may result in an order change.    Use of  contrast is at the discretion of the supervising Cardiologist.        Referrals     Future Labs/Procedures    Follow-Up with Cardiac Advanced Practice Provider     Home care nursing referral     Comments:    Resume RN and HHA as prior to admission    Your provider has ordered home care nursing services. If you have not been contacted within 2 days of your discharge please call the inpatient department phone number at [phone] .    Home Care OT Referral for Hospital Discharge     Comments:    Resume OT to eval and treat as PTA    Your provider has ordered home care - occupational therapy. If you have not been contacted within 2 days of your discharge please call the department phone number listed on the top of this document.    Home Care PT Referral for Hospital Discharge     Comments:    Resume PT to eval and treat as PTA    Your provider has ordered home care - physical therapy. If you have not been contacted within 2 days of your discharge please call the department phone number listed on the top of this document.    Medication Therapy Management Referral     Comments:    MTM referral reason            Patient had a hospital or ED visit in last 6 months and has more than 10   PTA or Discharge medications    Patient has 5 PTA or Discharge Medications AND one of the following   diagnoses: DM,HF,COPD,AMI DX,PULM HTN       This service is designed to help you get the most from your medications.  A specially trained pharmacist will work closely with you and your doctors  to solve any problems related to your medications and to help you get the   best results from taking them.      The Medication Therapy Management staff will call you to schedule an appointment.          Key Recommendations:  Pt admitted for NSTEMI w/ EF 30-35%. Met w/ pt at bedside to discuss CHF. Pt reports that she does not follow a special diet, reviewed low sodium diet. She does not add salt while cooking but does occasional add salt to her  meals, discussed alternatives to salt. She has a scale and does check her weights daily, she keeps a log of them. Reviewed CHF action plan and signs/symptoms to watch for and when to call provider, pt is receptive and understanding of this information.      [facility] she discharged to home w/ home care on 12/2.     Pt is open to [facility]; RN/PT/OT is planned for discharge.     Pt would benefit from clinic CC follow-up and reinforcement of this education.     Tahmina Larios    Communicated handoff via EPIC Comm Mgt to Dr. Piper Kiser's CC at [facility].      Sent by Cassie Casey RN, BSN, CTS  [facility]  [phone]

## 2018-12-13 NOTE — TELEPHONE ENCOUNTER
Patient was evaluated by cardiology while inpatient for SOB with elevated BNP and chest pressure. Pt is s/p left hip ORIF 11/18/18, and has continued using 02. Diagnosed with CHF exacerbation and stress induced CM. 12/10/18 coronary angiogram without intervention. IV diuresis achieved. Called patient to discuss any post hospital d/c questions she may have, review medication changes, and confirm f/u appts.  Patient denied any questions regarding new medications or changes with their PTA medications. Patient denied any SOB, chest pain, or light headedness. RRA cardiac cath site is without bleeding, swelling, redness or tenderness. Denies fever. RN confirmed with patient that she has an apt scheduled on 1/18/19 with [facility] Tomi Hopson, with echo scheduled on 1/17/19. Instructed patient to take daily weights and call clinic for a weight gain of 2 lbs overnight or 5 lbs in a week. Low Na+ diet encouraged. Pt receiving home care services from [facility]. Patient advised to call clinic with any cardiac related questions or concerns prior to this rafa't. Patient verbalized understanding and agreed with plan. MOISE Braxton RN.

## 2018-12-17 ENCOUNTER — ALLIED HEALTH/NURSE VISIT (OUTPATIENT)
Dept: PHARMACY | Facility: CLINIC | Age: 83
End: 2018-12-17
Attending: INTERNAL MEDICINE
Payer: COMMERCIAL

## 2018-12-17 DIAGNOSIS — I50.22 CHRONIC SYSTOLIC CONGESTIVE HEART FAILURE (H): ICD-10-CM

## 2018-12-17 DIAGNOSIS — R11.2 NAUSEA AND VOMITING, INTRACTABILITY OF VOMITING NOT SPECIFIED, UNSPECIFIED VOMITING TYPE: ICD-10-CM

## 2018-12-17 DIAGNOSIS — L29.9 ITCHING: ICD-10-CM

## 2018-12-17 DIAGNOSIS — K59.00 CONSTIPATION, UNSPECIFIED CONSTIPATION TYPE: ICD-10-CM

## 2018-12-17 DIAGNOSIS — S72.002D CLOSED FRACTURE OF NECK OF LEFT FEMUR WITH ROUTINE HEALING, SUBSEQUENT ENCOUNTER: ICD-10-CM

## 2018-12-17 DIAGNOSIS — J30.2 SEASONAL ALLERGIC RHINITIS, UNSPECIFIED TRIGGER: ICD-10-CM

## 2018-12-17 DIAGNOSIS — E78.5 HYPERLIPIDEMIA WITH TARGET LDL LESS THAN 130: ICD-10-CM

## 2018-12-17 DIAGNOSIS — J44.1 COPD EXACERBATION (H): ICD-10-CM

## 2018-12-17 DIAGNOSIS — F41.1 GAD (GENERALIZED ANXIETY DISORDER): ICD-10-CM

## 2018-12-17 DIAGNOSIS — E03.9 HYPOTHYROIDISM, UNSPECIFIED TYPE: ICD-10-CM

## 2018-12-17 DIAGNOSIS — S72.002A FRACTURE OF HIP, LEFT, CLOSED, INITIAL ENCOUNTER (H): ICD-10-CM

## 2018-12-17 DIAGNOSIS — G47.00 INSOMNIA, UNSPECIFIED TYPE: ICD-10-CM

## 2018-12-17 DIAGNOSIS — M81.0 SENILE OSTEOPOROSIS: ICD-10-CM

## 2018-12-17 DIAGNOSIS — I10 ESSENTIAL HYPERTENSION WITH GOAL BLOOD PRESSURE LESS THAN 140/90: ICD-10-CM

## 2018-12-17 DIAGNOSIS — D64.9 ANEMIA, UNSPECIFIED TYPE: ICD-10-CM

## 2018-12-17 DIAGNOSIS — I51.81 STRESS-INDUCED CARDIOMYOPATHY: Primary | ICD-10-CM

## 2018-12-17 PROCEDURE — 99605 MTMS BY PHARM NP 15 MIN: CPT | Performed by: PHARMACIST

## 2018-12-17 PROCEDURE — 99607 MTMS BY PHARM ADDL 15 MIN: CPT | Performed by: PHARMACIST

## 2018-12-17 RX ORDER — IPRATROPIUM BROMIDE AND ALBUTEROL SULFATE 2.5; .5 MG/3ML; MG/3ML
1 SOLUTION RESPIRATORY (INHALATION) EVERY 6 HOURS PRN
Qty: 90 ML | Refills: 3 | Status: SHIPPED | OUTPATIENT
Start: 2018-12-17 | End: 2018-12-17

## 2018-12-17 RX ORDER — IPRATROPIUM BROMIDE AND ALBUTEROL SULFATE 2.5; .5 MG/3ML; MG/3ML
1 SOLUTION RESPIRATORY (INHALATION) EVERY 6 HOURS PRN
Qty: 90 ML | Refills: 3 | Status: SHIPPED | OUTPATIENT
Start: 2018-12-17 | End: 2019-02-19 | Stop reason: ALTCHOICE

## 2018-12-17 RX ORDER — AMOXICILLIN 250 MG
2 CAPSULE ORAL 2 TIMES DAILY PRN
COMMUNITY
Start: 2018-12-17 | End: 2019-02-19

## 2018-12-17 NOTE — PROGRESS NOTES
SUBJECTIVE/OBJECTIVE:                Marie Kline is a 86 year old female called for a transitions of care visit.  She was discharged from [facility] on 12/7/18 for Stress-induced cardiomyopathy (NSTEMI).     Chief Complaint: Last MTM on 1/8/2018.     Allergies/ADRs: Reviewed in Epic  Tobacco: No tobacco use  Alcohol: not currently using  Caffeine: decaff coffee  Activity: reports bouts of fatigue; active at home with dog - walks up to 20 minutes   PMH: Reviewed in Epic - CLL (was on IVIG, f/u Far[facility] with [facility])    Medication Adherence: sets up own pill box    Cardiomyopathy/HFrEF/HTN: Current medications include metoprolol succinate 25 mg daily, furosemide 20 mg once daily, aspirin 325 mg daily. Also, has home oxygen used prn SOB with exertion. Not on an ACEI or ARB. Patient reports no current medication side effects.   ECHO:  Date 12/8/18, EF 30-35%  Pt is complaining of sx of HF - fluid in her left leg and foot  Pt is measuring daily weights - this morning she weighed 105.8 lb (per pt baseline weight 104 lbs)  Patient does not self-monitor BP.   No concerns with hypotensive symptoms.  BP Readings from Last 3 Encounters:   12/12/18 115/55   11/29/18 98/58   11/23/18 92/53     Allergic rhinitis/Itchiness: Current medications include none. Called on Friday about some itchy skin on head and body without redness or pain. She took 1 tablet of Benadryl and after 25-30 minutes it resolved. Primary allergy symptoms are runny nose, but not having any since not using home oxygen much.    Hyperlipidemia: Current therapy includes atorvastatin 20 mg daily.  Has been on statin for some time. No concerns noted.   Recent Labs   Lab Test 03/12/18  1009 09/12/17 10/09/14  0350   CHOL 151 145 167   HDL 47* 44 55   LDL 84 82 88   TRIG 98 95 121   CHOLHDLRATIO  --   --  3.0       Hypothyroidism: Patient is taking levothyroxine 75 mcg daily - changed to night time dosing when she has an empty stomach. Per hospital note was increased  to 88 mcg daily, but discharged on 75 mcg daily and to recheck TSH in 6 weeks. Patient is having the following symptoms: fatigue.   TSH   Date Value Ref Range Status   12/09/2018 7.36 (H) 0.40 - 4.00 mU/L Final     T4 Free   Date Value Ref Range Status   12/09/2018 1.15 0.76 - 1.46 ng/dL Final       Insomnia: Current medications include: trazodone 100 mg every night.  Reports sleep last night was 'wonderful'.  No morning grogginess or side effects.     Anemia: Taking ferrous sulfate once daily.  She had stopped this previously due to dark stools, but she is now aware that this can be a side effect of iron therapy. Prior to admission in Nov, Hgb was 12.  Hemoglobin   Date Value Ref Range Status   12/09/2018 9.8 (L) 11.7 - 15.7 g/dL Final     No results found for: VISH, IRON, FEB    COPD: Current medications: Albuterol MDI as needed and Nebs BID, and budesonide-formoterol (Symbicort) MDI 2 puffs  BID.  Rinses her mouth after use of the Symbicort.  Checks oxygen level at home; uses oxygen if O2 sats <90% but has not needed since being home.  Pt is not experiencing side effects.   Pt reports the following symptoms: some shortness of breath with activity, productive cough  Pt does not have an COPD Action Plan on file.   Has spirometry been completed: Yes     Anxiety: Currently taking gabapentin 300 mg BID. Reports always being tired; no clear relation between dose and symptoms.  No change in cognition per patient.  Reports anxiety following stay in TCU; didn't feel like she was well cared for and it was a very stressful time for her. Pt is wondering if she should be on this twice daily.  Serum creatinine: 0.92 mg/dL 12/11/18 0852  Estimated creatinine clearance: 33.5 mL/min    Osteoporosis/Fracture: Current therapy includes: denosumab (Prolia) SC injections, every 6 months (last dose 10/11/18) and MV daily. Pt is not experiencing side effects.  Pt is getting the following sources of dietary calcium: cheese, milk  Last  vitamin D level: NA  DEXA History: -2.3 from 9/27/16  Risk factors: post-menopausal  recent fracture  Discharged from [place] on 12/2/18 following hip fracture/surgery on 11/19/18.  Reports minimal pain' some pain in her hip when she gets up to ambulate.  Has weaned herself off tramadol; has taken 2 in the past week.  Now taking Tylenol as needed.    Constipation:  Taking Senna-S as needed; generally at night when she needs it but not needed lately. No constipation or diarrhea at this time.    Nausea:  Has ondansetron to take as needed.  Used once last week. Effective for her.  Not needed since then.    Current labs include:  BP Readings from Last 3 Encounters:   12/12/18 115/55   11/29/18 98/58   11/23/18 92/53     Today's Vitals: phone visit    Component Value Date    CR 1.21 09/12/2017     GFR Estimate   Date Value Ref Range Status   12/11/2018 58 (L) >60 mL/min/1.7m2 Corrected     Comment:     CORRECTED ON 12/11 AT 0956: PREVIOUSLY REPORTED AS 58 Non  GFR   Calc     12/10/2018 48 (L) >60 mL/min/1.7m2 Final     Comment:     Non  GFR Calc   12/09/2018 47 (L) >60 mL/min/1.7m2 Final     Comment:     Non  GFR Calc     Estimated Creatinine Clearance: 33.5 mL/min (based on SCr of 0.92 mg/dL).    Most Recent Immunizations   Administered Date(s) Administered     HepA-Adult 04/08/2009     HepB-Adult 04/08/2009     Influenza (High Dose) 3 valent vaccine 09/21/2017     Influenza (IIV3) PF 09/24/2014     Influenza (intradermal) 09/24/2013     Mantoux Tuberculin Skin Test 02/22/2016     Pneumo Conj 13-V (2010&after) 09/22/2015     Pneumococcal 23 valent 09/24/2013     Tdap (Adacel,Boostrix) 07/10/2014     Tdap (Adult) Unspecified Formulation 07/10/2014       ASSESSMENT:                             Current medications were reviewed today.     Medication Adherence: no issues identified    Cardiomyopathy/HFrEF/HTN: Needs Improvement. Pt is not on an ACEI and not at target  doses as recommend in pt's with EF < 40%. Pt's BP is low-normal.  Pt may benefit from initiation of an ACEI at low dose for tolerability. However, in cardiomyopathy may return of EF function. Follow-up with cardiology later this week.    Allergic rhinitis/Itchiness: Improved.    Hyperlipidemia: Stable. Pt would benefit from continuing current therapy.    Hypothyroidism: Stable. Last TSH is above normal range, but T4 within normal range. Pt may have had a dose increase for a couple days while in the hospital. However, given T4 wnl. Agree pt would benefit from maintaining on current dose and improving medication absorption by taking on an empty stomach. Pt would benefit from rechecking labs in 6 weeks.     Insomnia: Stable. Pt may benefit from continuing current regimen.    Anemia: Needs improvement. Hgb dropped since Nov. Pt would benefit from rechecking hgb and iron labs to ensure adequate therapy.    COPD: Needs Improvement. Patient would benefit from switching to Duoneb to improve bronchodilation and symptoms of productive cough, SOB.   COPD Action Plan due.     Anxiety: Improved. Now that physical stressor of TCU is gone, anxiety has improved. With pt's crcl, max dose of gabapentin 700 mg twice daily is appropriate. Pt is tolerating therapy. However, long-term Pt to discuss with PCP if appropriate to decrease dose of gabapentin.     Osteoporosis/Fracture: Stable. Pt would benefit from continuing current regimen.     Constipation: Improved. Pt would benefit from continuing therapy as PRN.    Nausea: Stable. Pt would benefit from continuing PRN therapy.    PLAN:                            1. Pt to discuss current dose of gabapentin with Dr. Kiser at appointment on Wednesday, would a reduction to gabapentin 300 mg HS be an option.    2. Dr. Kiser to consider rechecking hbg and iron labs. - verbal approval from Dr. Kiser    3. Dr. Kiser to recommend DuoNebs vs albuterol. - verbal approval from   [facility]    Future:  HF action plan and COPD action plan.  ACEI recommendation if ECHO remains in EF < 40%.    I spent 60 minutes with this patient today. I offer these suggestions for consideration by cc'd chart to the PCP. A copy of the visit note was provided to the patient's primary care provider.    Will follow up in 1 month.    The patient was given a summary of these recommendations as an after visit summary.     Laine Chadwick PharmD  Pharmaceutical Care Resident  Pager: [phone]    Chandler Perdue  [phone] (phone)  [phone] (pager)  Medication Therapy Management Pharmacist

## 2018-12-19 ENCOUNTER — OFFICE VISIT (OUTPATIENT)
Dept: INTERNAL MEDICINE | Facility: CLINIC | Age: 83
End: 2018-12-19
Payer: MEDICARE

## 2018-12-19 VITALS
OXYGEN SATURATION: 90 % | DIASTOLIC BLOOD PRESSURE: 70 MMHG | SYSTOLIC BLOOD PRESSURE: 110 MMHG | HEART RATE: 86 BPM | TEMPERATURE: 98.5 F | BODY MASS INDEX: 20.7 KG/M2 | RESPIRATION RATE: 16 BRPM | WEIGHT: 102.5 LBS

## 2018-12-19 DIAGNOSIS — F41.1 GAD (GENERALIZED ANXIETY DISORDER): ICD-10-CM

## 2018-12-19 DIAGNOSIS — R05.9 COUGH: ICD-10-CM

## 2018-12-19 DIAGNOSIS — I42.8 OTHER CARDIOMYOPATHY (H): Primary | ICD-10-CM

## 2018-12-19 DIAGNOSIS — R09.02 HYPOXIA: ICD-10-CM

## 2018-12-19 DIAGNOSIS — E55.9 VITAMIN D DEFICIENCY: ICD-10-CM

## 2018-12-19 DIAGNOSIS — R10.2 PELVIC PAIN IN FEMALE: ICD-10-CM

## 2018-12-19 PROCEDURE — 80048 BASIC METABOLIC PNL TOTAL CA: CPT | Performed by: INTERNAL MEDICINE

## 2018-12-19 PROCEDURE — 99215 OFFICE O/P EST HI 40 MIN: CPT | Performed by: INTERNAL MEDICINE

## 2018-12-19 PROCEDURE — 36415 COLL VENOUS BLD VENIPUNCTURE: CPT | Performed by: INTERNAL MEDICINE

## 2018-12-19 PROCEDURE — 82306 VITAMIN D 25 HYDROXY: CPT | Performed by: INTERNAL MEDICINE

## 2018-12-19 RX ORDER — GABAPENTIN 300 MG/1
300 CAPSULE ORAL 2 TIMES DAILY
Qty: 180 CAPSULE | Refills: 1 | Status: SHIPPED | OUTPATIENT
Start: 2018-12-19 | End: 2019-05-07

## 2018-12-19 RX ORDER — AZITHROMYCIN 250 MG/1
TABLET, FILM COATED ORAL
Qty: 6 TABLET | Refills: 0 | Status: SHIPPED | OUTPATIENT
Start: 2018-12-19 | End: 2019-01-21

## 2018-12-19 RX ORDER — LISINOPRIL 2.5 MG/1
2.5 TABLET ORAL DAILY
Qty: 90 TABLET | Refills: 0 | Status: SHIPPED | OUTPATIENT
Start: 2018-12-19 | End: 2019-05-01

## 2018-12-19 RX ORDER — SERTRALINE HYDROCHLORIDE 25 MG/1
25 TABLET, FILM COATED ORAL DAILY
Qty: 90 TABLET | Refills: 3 | Status: ON HOLD | OUTPATIENT
Start: 2018-12-19 | End: 2019-06-18

## 2018-12-19 RX ORDER — DOXYCYCLINE 100 MG/1
100 CAPSULE ORAL 2 TIMES DAILY
Qty: 20 CAPSULE | Refills: 0 | Status: SHIPPED | OUTPATIENT
Start: 2018-12-19 | End: 2019-02-19

## 2018-12-19 ASSESSMENT — ANXIETY QUESTIONNAIRES
1. FEELING NERVOUS, ANXIOUS, OR ON EDGE: NEARLY EVERY DAY
2. NOT BEING ABLE TO STOP OR CONTROL WORRYING: NEARLY EVERY DAY
GAD7 TOTAL SCORE: 12
6. BECOMING EASILY ANNOYED OR IRRITABLE: SEVERAL DAYS
3. WORRYING TOO MUCH ABOUT DIFFERENT THINGS: NEARLY EVERY DAY
7. FEELING AFRAID AS IF SOMETHING AWFUL MIGHT HAPPEN: NOT AT ALL
5. BEING SO RESTLESS THAT IT IS HARD TO SIT STILL: NOT AT ALL

## 2018-12-19 ASSESSMENT — PATIENT HEALTH QUESTIONNAIRE - PHQ9: 5. POOR APPETITE OR OVEREATING: MORE THAN HALF THE DAYS

## 2018-12-19 NOTE — NURSING NOTE
/70   Pulse 86   Temp 98.5  F (36.9  C) (Oral)   Resp 16   Wt 46.5 kg (102 lb 8 oz)   SpO2 90%   BMI 20.70 kg/m

## 2018-12-19 NOTE — PATIENT INSTRUCTIONS
Call cardiology-  Schedule February echocardiogram repeat and appointment    Start lisinopril.  Check potassium in one week.

## 2018-12-19 NOTE — PROGRESS NOTES
SUBJECTIVE:   Marie Kline is a 86 year old female who presents to clinic today for the following health issues:      Patient was present with daughter.    Hospital Follow-up Visit:    Hospital/Nursing Home/IP Rehab Facility: [facility]   Date of Admission: 11/18/18 & 12/7/18  Date of Discharge: 11/22/18 & 12/12/18  Reason(s) for Admission: first visit was due to fall & 2nd visit chest pain/nausea-new dx of stress induced cardiomyopathy    The patient had an echocardiogram revealing ejection fraction of 30-35%.    Her heart cath revealed no significant narrowing.  Cardiology diagnosed her with stress-induced cardiomyopathy.  The patient is on aspirin, lasix, metoprolol.  Her blood pressure was soft, so she did not tolerate lisinopril at that time.  Today her bp is 110 systolic and she feels well.      The patient does need a script for portable oxygen.  Walking, her oxygen is 87%.  The patient has noticed a cough.  She feels as if it is similar to her previous bronchitis episodes and would like an antibiotic.      The patient has been watching her salt.  She weighs herself daily, and has been losing weight.       The patient's daughter is wondering if she could start something for her mood.    LESLY score was elevated at 12.             Problems taking medications regularly:  None       Medication changes since discharge: added asa 325 mg, and changed time of day to take levothyroxine       Problems adhering to non-medication therapy:  None    Summary of hospitalization:  [facility] discharge summary reviewed  Diagnostic Tests/Treatments reviewed.  Follow up needed: none  Other Healthcare Providers Involved in Patient s Care:         None  Update since discharge: improved.     Post Discharge Medication Reconciliation: discharge medications reconciled, continue medications without change.  Plan of care communicated with patient     Coding guidelines for this visit:  Type  of Medical   Decision Making Face-to-Face Visit       within 7 Days of discharge Face-to-Face Visit        within 14 days of discharge   Moderate Complexity [id number]   High Complexity [id number]              PROBLEMS TO ADD ON...    Problem list and histories reviewed & adjusted, as indicated.  Additional history: as documented    Labs reviewed in EPIC    Reviewed and updated as needed this visit by clinical staff  Tobacco  Allergies  Meds  Med Hx  Surg Hx  Fam Hx  Soc Hx      Reviewed and updated as needed this visit by Provider         ROS:  CONSTITUTIONAL: NEGATIVE for fever, chills, change in weight  RESP:  POS cough  CV: NEGATIVE for chest pain, palpitations or peripheral edema  Psych: POS anxiety    OBJECTIVE:     /70   Pulse 86   Temp 98.5  F (36.9  C) (Oral)   Resp 16   Wt 46.5 kg (102 lb 8 oz)   SpO2 90%   BMI 20.70 kg/m    Body mass index is 20.7 kg/m .  GENERAL: healthy, alert and no distress  RESP: lungs clear to auscultation - no rales, rhonchi or wheezes  CV: regular rate and rhythm, normal S1 S2, no S3 or S4, no murmur, click or rub  Psych: normal affect    ASSESSMENT/PLAN:     (I42.8) Other cardiomyopathy (H)  (primary encounter diagnosis)  Comment: start lisinopril  Plan: lisinopril (PRINIVIL/ZESTRIL) 2.5 MG tablet,         Basic metabolic panel, Basic metabolic panel        -pt to call if she feels lightheaded; nurse to check her bp and bmp in one week    (E55.9) Vitamin D deficiency  Comment: assess  Plan: Vitamin D Deficiency            (R10.2) Pelvic pain in female  Comment: needs refill  Plan: gabapentin (NEURONTIN) 300 MG capsule            (R09.02) Hypoxia  Comment: 87% with ambulation  Plan: order for DME, order for DME           (R05) Cough  Comment: likely bronchitis  Plan: doxycycline hyclate (VIBRAMYCIN) 100 MG capsule        Switched zpak to doxycycline, as the QT was slightly prolonged on recent EKG  Called and left a message with patient and sent in new script  and to void old script to pharmacy.    (F41.1) LESLY (generalized anxiety disorder)  Comment: start zoloft  Plan: sertraline (ZOLOFT) 25 MG tablet, MENTAL HEALTH        REFERRAL  - Adult; Outpatient Treatment;         Individual/Couples/Family/Group Therapy/Health         Psychology; [facility]: [facility]         [phone]; We will contact you to schedule        the appointment or please call with any         questions       -f/u in 1 month    Piper Kiser MD  [facility]      >40 minutes with patient, >50% spent counseling

## 2018-12-20 DIAGNOSIS — F51.01 PRIMARY INSOMNIA: ICD-10-CM

## 2018-12-20 LAB
ANION GAP SERPL CALCULATED.3IONS-SCNC: 8 MMOL/L (ref 3–14)
BUN SERPL-MCNC: 8 MG/DL (ref 7–30)
CALCIUM SERPL-MCNC: 9.2 MG/DL (ref 8.5–10.1)
CHLORIDE SERPL-SCNC: 99 MMOL/L (ref 94–109)
CO2 SERPL-SCNC: 30 MMOL/L (ref 20–32)
CREAT SERPL-MCNC: 1 MG/DL (ref 0.52–1.04)
DEPRECATED CALCIDIOL+CALCIFEROL SERPL-MC: 45 UG/L (ref 20–75)
GFR SERPL CREATININE-BSD FRML MDRD: 51 ML/MIN/{1.73_M2}
GLUCOSE SERPL-MCNC: 107 MG/DL (ref 70–99)
POTASSIUM SERPL-SCNC: 4.1 MMOL/L (ref 3.4–5.3)
SODIUM SERPL-SCNC: 137 MMOL/L (ref 133–144)

## 2018-12-20 ASSESSMENT — ANXIETY QUESTIONNAIRES: GAD7 TOTAL SCORE: 12

## 2018-12-20 NOTE — TELEPHONE ENCOUNTER
"Requested Prescriptions   Pending Prescriptions Disp Refills     traZODone (DESYREL) 50 MG tablet [Pharmacy Med Name: TraZODone HCl Oral Tablet 50 MG]  Last Written Prescription Date: Historical  Last Fill Quantity: NA, # Refills: NA  Last Office Visit: 12/20/2018 [facility]  Future Office visit:      180 tablet 2     Sig: TAKE 2 TABLETS BY MOUTH NIGHTLY AS NEEDED FOR SLEEP    Serotonin Modulators Passed - 12/20/2018 12:16 PM       Passed - Recent (12 mo) or future (30 days) visit within the authorizing provider's specialty    Patient had office visit in the last 12 months or has a visit in the next 30 days with authorizing provider or within the authorizing provider's specialty.  See \"Patient Info\" tab in inbasket, or \"Choose Columns\" in Meds & Orders section of the refill encounter.             Passed - Patient is age 18 or older       Passed - No active pregnancy on record       Passed - No positive pregnancy test in past 12 months          "

## 2018-12-21 RX ORDER — TRAZODONE HYDROCHLORIDE 50 MG/1
TABLET, FILM COATED ORAL
Qty: 180 TABLET | Refills: 2 | Status: ON HOLD | OUTPATIENT
Start: 2018-12-21 | End: 2019-06-18

## 2019-01-07 ENCOUNTER — APPOINTMENT (OUTPATIENT)
Dept: GENERAL RADIOLOGY | Facility: CLINIC | Age: 84
End: 2019-01-07
Payer: MEDICARE

## 2019-01-07 ENCOUNTER — HOSPITAL ENCOUNTER (EMERGENCY)
Facility: CLINIC | Age: 84
Discharge: HOME | End: 2019-01-07
Attending: EMERGENCY MEDICINE | Admitting: EMERGENCY MEDICINE
Payer: MEDICARE

## 2019-01-07 ENCOUNTER — TELEPHONE (OUTPATIENT)
Dept: INTERNAL MEDICINE | Facility: CLINIC | Age: 84
End: 2019-01-07

## 2019-01-07 VITALS
TEMPERATURE: 99.2 F | OXYGEN SATURATION: 93 % | DIASTOLIC BLOOD PRESSURE: 66 MMHG | RESPIRATION RATE: 22 BRPM | SYSTOLIC BLOOD PRESSURE: 125 MMHG | HEART RATE: 78 BPM

## 2019-01-07 DIAGNOSIS — J44.1 COPD EXACERBATION (H): ICD-10-CM

## 2019-01-07 LAB
ANION GAP SERPL CALCULATED.3IONS-SCNC: 3 MMOL/L (ref 3–14)
ANISOCYTOSIS BLD QL SMEAR: SLIGHT
BASOPHILS # BLD AUTO: 0 10E9/L (ref 0–0.2)
BASOPHILS NFR BLD AUTO: 0 %
BUN SERPL-MCNC: 13 MG/DL (ref 7–30)
CALCIUM SERPL-MCNC: 8.8 MG/DL (ref 8.5–10.1)
CHLORIDE SERPL-SCNC: 101 MMOL/L (ref 94–109)
CO2 SERPL-SCNC: 34 MMOL/L (ref 20–32)
CREAT SERPL-MCNC: 1.01 MG/DL (ref 0.52–1.04)
DIFFERENTIAL METHOD BLD: ABNORMAL
EOSINOPHIL # BLD AUTO: 0 10E9/L (ref 0–0.7)
EOSINOPHIL NFR BLD AUTO: 0 %
ERYTHROCYTE [DISTWIDTH] IN BLOOD BY AUTOMATED COUNT: 15.9 % (ref 10–15)
FLUAV+FLUBV AG SPEC QL: NEGATIVE
FLUAV+FLUBV AG SPEC QL: NEGATIVE
GFR SERPL CREATININE-BSD FRML MDRD: 50 ML/MIN/{1.73_M2}
GLUCOSE SERPL-MCNC: 112 MG/DL (ref 70–99)
HCT VFR BLD AUTO: 40.1 % (ref 35–47)
HGB BLD-MCNC: 11.7 G/DL (ref 11.7–15.7)
LACTATE BLD-SCNC: 1.2 MMOL/L (ref 0.7–2)
LYMPHOCYTES # BLD AUTO: 94.5 10E9/L (ref 0.8–5.3)
LYMPHOCYTES NFR BLD AUTO: 94 %
MCH RBC QN AUTO: 28.8 PG (ref 26.5–33)
MCHC RBC AUTO-ENTMCNC: 29.2 G/DL (ref 31.5–36.5)
MCV RBC AUTO: 99 FL (ref 78–100)
MICROCYTES BLD QL SMEAR: PRESENT
MONOCYTES # BLD AUTO: 0 10E9/L (ref 0–1.3)
MONOCYTES NFR BLD AUTO: 0 %
NEUTROPHILS # BLD AUTO: 6 10E9/L (ref 1.6–8.3)
NEUTROPHILS NFR BLD AUTO: 6 %
NT-PROBNP SERPL-MCNC: 502 PG/ML (ref 0–1800)
OVALOCYTES BLD QL SMEAR: SLIGHT
PLATELET # BLD AUTO: 212 10E9/L (ref 150–450)
PLATELET # BLD EST: ABNORMAL 10*3/UL
POTASSIUM SERPL-SCNC: 4.2 MMOL/L (ref 3.4–5.3)
RBC # BLD AUTO: 4.06 10E12/L (ref 3.8–5.2)
SODIUM SERPL-SCNC: 138 MMOL/L (ref 133–144)
SPECIMEN SOURCE: NORMAL
TROPONIN I SERPL-MCNC: <0.015 UG/L (ref 0–0.04)
WBC # BLD AUTO: 100.5 10E9/L (ref 4–11)

## 2019-01-07 PROCEDURE — 85025 COMPLETE CBC W/AUTO DIFF WBC: CPT | Performed by: EMERGENCY MEDICINE

## 2019-01-07 PROCEDURE — 25000128 H RX IP 250 OP 636: Performed by: EMERGENCY MEDICINE

## 2019-01-07 PROCEDURE — 84484 ASSAY OF TROPONIN QUANT: CPT | Performed by: EMERGENCY MEDICINE

## 2019-01-07 PROCEDURE — 96374 THER/PROPH/DIAG INJ IV PUSH: CPT

## 2019-01-07 PROCEDURE — 96361 HYDRATE IV INFUSION ADD-ON: CPT

## 2019-01-07 PROCEDURE — 71046 X-RAY EXAM CHEST 2 VIEWS: CPT

## 2019-01-07 PROCEDURE — 80048 BASIC METABOLIC PNL TOTAL CA: CPT | Performed by: EMERGENCY MEDICINE

## 2019-01-07 PROCEDURE — 87804 INFLUENZA ASSAY W/OPTIC: CPT | Mod: 91 | Performed by: EMERGENCY MEDICINE

## 2019-01-07 PROCEDURE — 83605 ASSAY OF LACTIC ACID: CPT | Performed by: EMERGENCY MEDICINE

## 2019-01-07 PROCEDURE — 94640 AIRWAY INHALATION TREATMENT: CPT

## 2019-01-07 PROCEDURE — 83880 ASSAY OF NATRIURETIC PEPTIDE: CPT | Performed by: EMERGENCY MEDICINE

## 2019-01-07 PROCEDURE — 99285 EMERGENCY DEPT VISIT HI MDM: CPT | Mod: 25

## 2019-01-07 PROCEDURE — 25000125 ZZHC RX 250: Performed by: EMERGENCY MEDICINE

## 2019-01-07 PROCEDURE — 93005 ELECTROCARDIOGRAM TRACING: CPT

## 2019-01-07 RX ORDER — PREDNISONE 20 MG/1
TABLET ORAL
Qty: 11 TABLET | Refills: 0 | Status: SHIPPED | OUTPATIENT
Start: 2019-01-07 | End: 2019-02-19

## 2019-01-07 RX ORDER — DOXYCYCLINE 100 MG/1
100 CAPSULE ORAL 2 TIMES DAILY
Qty: 14 CAPSULE | Refills: 0 | Status: SHIPPED | OUTPATIENT
Start: 2019-01-07 | End: 2019-02-19

## 2019-01-07 RX ORDER — METHYLPREDNISOLONE SODIUM SUCCINATE 125 MG/2ML
125 INJECTION, POWDER, LYOPHILIZED, FOR SOLUTION INTRAMUSCULAR; INTRAVENOUS ONCE
Status: COMPLETED | OUTPATIENT
Start: 2019-01-07 | End: 2019-01-07

## 2019-01-07 RX ORDER — IPRATROPIUM BROMIDE AND ALBUTEROL SULFATE 2.5; .5 MG/3ML; MG/3ML
3 SOLUTION RESPIRATORY (INHALATION) ONCE
Status: COMPLETED | OUTPATIENT
Start: 2019-01-07 | End: 2019-01-07

## 2019-01-07 RX ADMIN — IPRATROPIUM BROMIDE AND ALBUTEROL SULFATE 3 ML: .5; 3 SOLUTION RESPIRATORY (INHALATION) at 19:20

## 2019-01-07 RX ADMIN — SODIUM CHLORIDE 500 ML: 9 INJECTION, SOLUTION INTRAVENOUS at 19:59

## 2019-01-07 RX ADMIN — METHYLPREDNISOLONE SODIUM SUCCINATE 125 MG: 125 INJECTION, POWDER, FOR SOLUTION INTRAMUSCULAR; INTRAVENOUS at 19:20

## 2019-01-07 ASSESSMENT — ENCOUNTER SYMPTOMS
FEVER: 0
WHEEZING: 0
COUGH: 1

## 2019-01-07 NOTE — TELEPHONE ENCOUNTER
Call Center Reason:[id number]    Irish and RN with [facility] is calling regarding respiratory symptoms that Marie is experiencing. Her lungs are clear but she has a productive cough with thick sputum. It is difficult for her to clear. She is not running a fever.   She is on 2 liters of oxygen to stay above 90% while at rest. On 2 liters of oxygen she is at 94-96%. There has been no weight changes and she is using her neb regularly.     Also, she would like verbal orders for another nurse visit this week to recheck this issue.

## 2019-01-07 NOTE — TELEPHONE ENCOUNTER
The patient reports that she has had some shortness of breath.      She has had coughing up phlegm since Saturday night. She has had yellow phlegm.    She has not been sleeping well.  She is more short of breath. Her oxygen level has been 88-90%.      Oxygen is 100% and has improved on oxygen.    Her pulse is still elevated over 100.      Recommend she go to the ER.     Patient agrees and will go to the ER.     Updated nurse with patient's status.

## 2019-01-07 NOTE — TELEPHONE ENCOUNTER
Irish calls back. (Phone: [phone], ok to leave detailed voice message)    Patient was not oxygen when she arrived for home visit - has not needed this since she was discharged from the hospital. O2 saturation was 88-90% on RA today, Irish had patient start 2L oxygen and advised to wear this for now due to cold symptoms. The cough is keeping her awake at night and is having soreness around ribs and abdomen from cough. She is using nebs and inhalers and taking Prednisone 5mg daily.     Pulse 110  Afebrile    Verbal authorization given for additional nurse visit this per [facility] protocol to re-evaluate patient this week. Irish instructed patient on symptoms to watch for and reasons to go to the ER. Irish asks if Dr. Kiser wants to order any medications or wait for update later this week.

## 2019-01-07 NOTE — TELEPHONE ENCOUNTER
No phone number listed, called Gladys at Home office - [phone]. They will page Irish to call this RN back.

## 2019-01-07 NOTE — ED AVS SNAPSHOT
[facility] Emergency Department  [address]  [facility] [address]  Phone:  [phone]  Fax:  [phone]                                    Marie Kline   MRN: [id number]    Department:  [facility] Emergency Department   Date of Visit:  1/7/2019           After Visit Summary Signature Page    I have received my discharge instructions, and my questions have been answered. I have discussed any challenges I see with this plan with the nurse or doctor.    ..........................................................................................................................................  Patient/Patient Representative Signature      ..........................................................................................................................................  Patient Representative Print Name and Relationship to Patient    ..................................................               ................................................  Date                                   Time    ..........................................................................................................................................  Reviewed by Signature/Title    ...................................................              ..............................................  Date                                               Time          22EPIC Rev 08/18

## 2019-01-08 ENCOUNTER — PATIENT OUTREACH (OUTPATIENT)
Dept: CARE COORDINATION | Facility: CLINIC | Age: 84
End: 2019-01-08

## 2019-01-08 LAB — INTERPRETATION ECG - MUSE: NORMAL

## 2019-01-08 ASSESSMENT — ACTIVITIES OF DAILY LIVING (ADL): DEPENDENT_IADLS:: INDEPENDENT

## 2019-01-08 NOTE — ED TRIAGE NOTES
Patient states that since Saturday she has had a cough since Saturday. Today her home nurse came to visit and was told to come to ED. Coughing up yellow thick sputum and has had increased work of breathing. Oxygen at home on 2L, RA sat 87%. History of COPD, recent hip surgery.

## 2019-01-08 NOTE — LETTER
[place] CARE COORDINATION  [address]  [facility] [address]    January 8, 2019    Marie Kline  [address]  [facility] [address]      Dear Marie,    I am a clinic care coordinator who works with Piper Kiser MD at [facility]. I wanted to thank you for spending the time to talk with me. Please do not hesitate to call me with questions or concerns about your health care. Below is a description of clinic care coordination and how I can further assist you.     The clinic care coordinator is a registered nurse and/or  who understand the health care system. The goal of clinic care coordination is to help you manage your health and improve access to the [facility] system in the most efficient manner. The registered nurse can assist you in meeting your health care goals by providing education, coordinating services, and strengthening the communication among your providers. The  can assist you with financial, behavioral, psychosocial, chemical dependency, counseling, and/or psychiatric resources.    Please feel free to contact me at [phone], with any questions or concerns. We at [facility] are focused on providing you with the highest-quality healthcare experience possible and that all starts with you.     Sincerely,     Babs Garcia RN-BSN   Care Coordination  Phone:  [phone]  Email: [email or web address]      Enclosed: I have enclosed a copy of a 24 Hour Access Plan. This has helpful phone numbers for you to call when needed. Please keep this in an easy to access place to use as needed.

## 2019-01-08 NOTE — PLAN OF CARE
Problem: Goal Outcome Summary  Goal: Goal Outcome Summary  Outcome: No Change  VS stable. 93% on 2L 02. BENJAMIN. Diminished LS with non productive cough. No complaints of pain. Tele is sinus rhythm. Slept well throughout the night.       Review pended refill request as it does not fall under a protocol.     Requested Prescriptions     Pending Prescriptions Disp Refills   • nystatin [id number] UNIT/GM External Cream 45 g 1     Sig: Apply bid with other cream   • triamcinolone acetonide 0.1 % Ext

## 2019-01-08 NOTE — PROGRESS NOTES
Clinic Care Coordination Contact  Care Coordination Communication    Referral Source: ED Follow-Up    Clinical Data: Patient was hospitalized at [facility] ED on 1/9/19 with complaints of increased shortness of breath and worsening cough.  Patient sent to ED by home care RN.      Home Care Contact:              Home Care Agency: [facility] RN/PT/OT BECKY Coburn RN CM  [phone]  Fax: [phone]              Care Coordination contacted home care:  Yes, RN CC called and updated Irish on discharge from ED with new abx and prednisone taper.               Resumptoin of care date: 1/9/18     Patient Contact:               Rn CC spoke with patient over phone.  Patient states she was diagnosed with upper respiratory infection, she is thankful it was not pneumonia.  She was given abx to take and a prednisone taper.  Patient was instructed to use O2 continuously.  Today she is feeling tired.  She was not able to sleep well overnight.  RN CC encouraged her to take the day to rest and drink plenty of fluids.    Home care will resume visits likely tomorrow.  RN CC will fax changed to medications to [facility] home care RN.  Will also update pcp on patient status per request.    Rn CC will mail patient care coordination letter/contact info.  Patient thankful for call today.  Patient encouraged to call with any needs.    Plan: RN/SW Care Coordinator will await notification from home care staff informing RN/SW Care Coordinator of patients discharge plans/needs. RN/SW Care Coordinator will review chart and outreach to home care every 4 weeks and as needed.      Babs Garcia RN-BSN   Care Coordination  Phone:  [phone]  Email: [email or web address]  .

## 2019-01-08 NOTE — ED PROVIDER NOTES
History     Chief Complaint:  Cough    HPI   Marie Kline is a 86 year old female with a history of leukemia, COPD, CHF, and pneumonia who presents with cough. The patient states that her RN came to see her today to work on her hip. The patient states she was coughing a lot today and the nurse registered her O2 was 84% saturation and heart rate was 130, so the nurse put her on 2 L of O2. The patient states that her doctor, Dr. Rubin, called and thinks that she has pneumonia. The patient states she has been coughing for the past 2 days.  The patient states that she uses her nebulizer once in the morning and once at night. The patient states that she used her nebulizer once tonight but did not use it this morning as she was feeling fine. The patient denies wheezing, fevers, or leg swelling. The patient notes that she has an appointment with her oncologist in two days. The sister states that the patient does not have nursing assistance where she is living.     Allergies:  CT Dye  Wound dressing adhesives      Medications:    Albuterol  Lipitor   Zithromax  Symbicort  Synthroid  Gabapentin   Trazodone   Prednisone     Past Medical History:    Arthritis   Acute MI  Bladder cancer    Cardiomyopathy   chronic lymphocytic leukemia  CKD  Congestive heart failure    COPD   CHF  Central spinal stenosis   DDD  Back pain with radiation   Hypertension  Hyperlipidemia    Hypothyroid   Mumps   Myocardial infarction    NSTEMI  Pulmonary edema cardiac cause  Pneumonia   Splenomegaly  Senile osteoporosis     Tricuspid valve disorders, specified as nonrheumatic  Xerosis cutis   Purpura senilis     Past Surgical History:    biopsy lymph node inguinal   Bladder surgery  Coronary angiograph   Open reduction internal fixation hip    Family History:    Mother: cerebrovascular disease  Father: cancer    Social History:  The patient was accompanied to the ED by friend, Lulu.   Smoking Status: former smoker  Smokeless Tobacco: Never  Used  Alcohol Use: no  Marital Status:       Review of Systems   Constitutional: Negative for fever.   Respiratory: Positive for cough. Negative for wheezing.    Cardiovascular: Negative for leg swelling.   All other systems reviewed and are negative.    Physical Exam     Patient Vitals for the past 24 hrs:   BP Temp Temp src Pulse Resp SpO2   01/07/19 2114 -- -- -- -- -- 93 %   01/07/19 2113 -- -- -- -- -- 94 %   01/07/19 2111 -- -- -- -- -- 94 %   01/07/19 2110 -- -- -- -- -- 93 %   01/07/19 2109 -- -- -- -- -- 93 %   01/07/19 2108 -- -- -- -- -- 94 %   01/07/19 2040 125/66 -- -- 78 -- (!) 87 %   01/07/19 2005 -- -- -- -- -- 93 %   01/07/19 2000 128/79 -- -- 91 -- 93 %   01/1935 -- -- -- 91 -- --   01/07/19 1920 -- -- -- -- -- 95 %   01/07/19 1915 -- -- -- -- -- 94 %   01/07/19 1910 -- -- -- -- -- 93 %   01/07/19 1905 -- -- -- -- -- 92 %   01/07/19 1900 101/70 -- -- 96 -- 93 %   01/07/19 1836 126/73 99.2  F (37.3  C) Oral 101 22 (!) 87 %     Physical Exam    Nursing note and vitals reviewed.  Constitutional: Cooperative.   HENT:   Mouth/Throat: Moist mucous membranes.   Eyes: EOMI, nonicteric sclera  Cardiovascular: Normal rate, regular rhythm, no murmurs, rubs, or gallops  Pulmonary/Chest: Effort normal. No respiratory distress. Bilateral coarse wheezing. No rales.   Abdominal: Soft. Nontender, nondistended, no guarding or rigidity. BS present.   Musculoskeletal: Normal range of motion.   Neurological: Alert. Moves all extremities spontaneously.   Skin: Skin is warm and dry. No rash noted.   Psychiatric: Normal mood and affect.   Emergency Department Course   ECG:  ECG taken at 1855, ECG read at 1929  Normal sinus rhythm with sinus arrhythmia  Normal ECG  Rate 93 bpm. OR interval 192 ms. QRS duration 88 ms. QT/QTc 346/430 ms. P-R-T axes 64 72 61.  No significant change when compared to EKG dated 12/8/18.    Imaging:  Radiology findings were communicated with the patient who voiced understanding of  the findings.    XR Chest 2 Views  Lungs remain hyperinflated suggestive of emphysema. Linear  opacity in the left midlung appears grossly similar to prior and could  represent chronic scarring or atelectasis. No definite new focal  infiltrate. No significant pleural effusion or pneumothorax.  Degenerative changes in the spine. Chronic right ninth rib fracture.  GODWIN GUERIN MD   Reading per radiology    Laboratory:  Laboratory findings were communicated with the patient who voiced understanding of the findings.    CBC: .5(HH), HGB 11.7,   BMP: carbon dioxide 34(H), GFR estimate 50(L),  o/w WNL (Creatinine 1.01)  Lactic acid whole blood: 1.2  Troponin (Collected 1903): <0.015  BNP: 502    Influenza A/B antigen: negative     Interventions:  1920 Solumedrol 125 mg IV  1920 Duoneb 3 mL Neb  1959  mL IV    Emergency Department Course:    1845 Nursing notes and vitals reviewed.    1903 I performed an exam of the patient as documented above.    IV was inserted and blood was drawn for laboratory testing, results above.    2000 Influenza antibody A/B was obtained, results above.     2023 The patient was sent for a XR Chest while in the emergency department, results above.     2115 I personally reviewed the laboratory and imaging results with the patient and answered all related questions prior to discharge.    Impression & Plan      Medical Decision Making:  Marie Kline is a 86 year old female who presents to the emergency department today for evaluation of cough and hypoxia. Pt has home oxygen since she was hospitalized. She is maintaining her oxygen saturation on her baseline home oxygen. She was wheezing on exam that improved with a neb. CXR negative for pneumonia. Pt does have significant leukocytosis in the context of her CLL. Her lactic acid level is normal and she's afebrile here. Pt feels improved and she desires discharge home. She has close follow-up with her oncologist. I think it's  reasonable to attempt outpt treatment for what's likely COPD exacerbation. Given her immunosuppression, will start doxycycline as well. She is in stable condition at the time of discharge, indications for return to the ED were discussed as well as follow up. All questions were answered and she is in agreement with the plan.    Diagnosis:    ICD-10-CM    1. COPD exacerbation (H) J44.1       Disposition:   The patient is discharged to home.    Discharge Medications:        START taking      Dose / Directions   doxycycline hyclate 100 MG capsule  Commonly known as:  VIBRAMYCIN      Dose:  100 mg  Take 1 capsule (100 mg) by mouth 2 times daily for 7 days  Quantity:  14 capsule  Refills:  0     prednisone 20 MG tablet  Commonly known as:  DELTASONE      Start taking on:  1/7/2019  Take 40 mg by mouth daily for 3 days, THEN 20 mg daily for 3 days, THEN 10 mg daily for 4 days.  Quantity:  11 tablet  Refills:  0        CONTINUE these medicines which have NOT CHANGED      Dose / Directions   order for DME  Used for:  COPD exacerbation (H)      Equipment being ordered: Nebulizer  Fax order to [facility] [phone]  Quantity:  1 each  Refills:  0     order for DME  Used for:  Hypoxia      Equipment being ordered: portable oxygen  Quantity:  1 each  Refills:  0     order for DME  Used for:  Hypoxia      Equipment being ordered: portable oxygen;   Ambulatory O2 saturation of 87%  Quantity:  1 each  Refills:  1           Where to get your medicines      Some of these will need a paper prescription and others can be bought over the counter. Ask your nurse if you have questions.    Bring a paper prescription for each of these medications    doxycycline hyclate 100 MG capsule    prednisone 20 MG tablet         Scribe Disclosure:  I, Mariia Jalloh, am serving as a scribe at 6:46 PM on 1/7/2019 to document services personally performed by Joseph Gonzalez MD based on my observations and the provider's statements to  me.  [facility] EMERGENCY DEPARTMENT       Joseph Gonzalez MD  01/12/19 0882

## 2019-01-08 NOTE — LETTER
Health Care Home - Access Care Plan    About Me  Patient Name:  Marie Kline    YOB: 1932  Age:                             86 year old   [place] MRN:            [id number] Telephone Information:   Home Phone [phone]   Mobile [phone]       Address:    [address] [place] [address]  Katherine Valderrama  [facility] [address] Email address:  [email or web address]      Emergency Contact(s)  Name Relationship Lgl Grd Work Phone Home Phone Mobile Phone   1. GERALDINE, PE* Daughter No  [phone]   2. IQRA, * Relative No   [phone]   3. EMILIA BUCHANAN Grandchild No   [phone]             Health Maintenance: Routine Health maintenance Reviewed: Not assessed    My Access Plan  Medical Emergency 911   Questions or concerns during clinic hours Primary Clinic Line, I will call the clinic directly: [facility] - [phone]   24 Hour Appointment Line [phone] or  [phone] ([phone]) (toll free)   24 Hour Nurse Line [phone] (toll free)   Questions or concerns outside clinic hours 24 Hour Appointment Line, I will call the after-hours on-call line:   [facility] [phone] or [phone] ([phone]) (toll-free)   Preferred Urgent Care [facility] [place], [phone]   Preferred Hospital [facility]  [phone]   Preferred Pharmacy [id number] [address]     Behavioral Health Crisis Line The National Suicide Prevention Lifeline at 1-390.661.9069 or 916     My Care Team Members  Patient Care Team       Relationship Specialty Notifications Start End    Piper Kiser MD PCP - General Internal Medicine  11/11/14     Phone: [phone] Fax: [phone] [address] [facility] [address]    Piper Kiser MD PCP - Assigned PCP   11/13/16     Phone: [phone] Fax: [phone]         [address] [facility] [address]    Isabel Ivory MD  MD Oncology  2/6/17     Phone: [phone] Fax: [phone]         [facility] [address] [address]    Babs Garcia, RN Clinic Care Coordinator Primary Care -   11/23/18     Phone: [phone] Fax: [phone]               My Medical and Care Information  Problem List   Patient Active Problem List   Diagnosis     Acute and chronic respiratory failure with hypercapnia (HCC)     Nonischemic cardiomyopathy (H)     Pneumonia     Acute myocardial infarction, initial episode of care (HCC)     CLL (chronic lymphocytic leukemia) (HCC)     CHF (congestive heart failure) (HCC)     Cardiomyopathy in other diseases classified elsewhere (HCC)     Hyperlipidemia with target LDL less than 130     Health Care Home     COPD exacerbation (H)     LESLY (generalized anxiety disorder)     Hypothyroidism     Back pain with radiation     DDD (degenerative disc disease), lumbar     Splenomegaly     Lumbar degenerative disc disease     Lumbar foraminal stenosis     Central spinal stenosis     Bladder cancer (H)     Sepsis (H)     Senile osteoporosis     CKD (chronic kidney disease) stage 3, GFR 30-59 ml/min (H)     Purpura senilis (H)     Xerosis cutis     Malignant neoplasm of urinary bladder, unspecified site (H)     Hypoxia     Femoral neck fracture (H)     S/P total hip arthroplasty     NSTEMI (non-ST elevated myocardial infarction) (H)     Stress-induced cardiomyopathy      Current Medications and Allergies:  See printed Medication Report

## 2019-01-09 ENCOUNTER — PATIENT OUTREACH (OUTPATIENT)
Dept: CARE COORDINATION | Facility: CLINIC | Age: 84
End: 2019-01-09

## 2019-01-09 ENCOUNTER — TELEPHONE (OUTPATIENT)
Dept: INTERNAL MEDICINE | Facility: CLINIC | Age: 84
End: 2019-01-09

## 2019-01-09 NOTE — PROGRESS NOTES
Clinic Care Coordination Contact  Care Team Conversations    Received call from JEROMY Coburn with [facility] for update on patient status.  Per Irish she attempted to wean patient off O2 today to 1L.  Patient did dip in O2 saturations at rest and was increased back to 2L.   At rest on 2L patient is 96%.    BP today was 96/60 - patient is not symptomatic.  Patient encouraged to drink plenty of water.      Patient has been logging weight.  Noted to have steady increase in weight.    1/7 -101 lbs, 1/8- 103 lbs, 1/9- 105 lbs.  Patient does not complain of worsening shortness of breath.  No increase swelling in legs. Patient does however states she tires easliy.    Patient to see physical therapy tomorrow with [facility].  Next RN visit in Monday.  Irish is requesting to increase RN visits to 2 x per week x 2 weeks. RN CC will verify with PCP this is ok with her.    RN CC will continue to follow.    Addendum:  PCP gave ok for increased nursing visits.    Babs Garcia RN-BSN   Care Coordination  Phone:  [phone]  Email: [email or web address]

## 2019-01-14 ENCOUNTER — APPOINTMENT (OUTPATIENT)
Dept: GENERAL RADIOLOGY | Facility: CLINIC | Age: 84
End: 2019-01-14
Payer: MEDICARE

## 2019-01-14 ENCOUNTER — HOSPITAL ENCOUNTER (EMERGENCY)
Facility: CLINIC | Age: 84
Discharge: HOME | End: 2019-01-14
Attending: PHYSICIAN ASSISTANT | Admitting: PHYSICIAN ASSISTANT
Payer: MEDICARE

## 2019-01-14 ENCOUNTER — TELEPHONE (OUTPATIENT)
Dept: INTERNAL MEDICINE | Facility: CLINIC | Age: 84
End: 2019-01-14

## 2019-01-14 VITALS
TEMPERATURE: 98.2 F | HEART RATE: 92 BPM | WEIGHT: 103.84 LBS | HEIGHT: 59 IN | SYSTOLIC BLOOD PRESSURE: 109 MMHG | BODY MASS INDEX: 20.93 KG/M2 | DIASTOLIC BLOOD PRESSURE: 59 MMHG | OXYGEN SATURATION: 95 % | RESPIRATION RATE: 20 BRPM

## 2019-01-14 DIAGNOSIS — Z87.09 HISTORY OF COPD: ICD-10-CM

## 2019-01-14 DIAGNOSIS — R09.02 HYPOXIA: ICD-10-CM

## 2019-01-14 LAB
ANION GAP SERPL CALCULATED.3IONS-SCNC: 4 MMOL/L (ref 3–14)
ANISOCYTOSIS BLD QL SMEAR: SLIGHT
BASOPHILS # BLD AUTO: 0 10E9/L (ref 0–0.2)
BASOPHILS NFR BLD AUTO: 0 %
BUN SERPL-MCNC: 14 MG/DL (ref 7–30)
CALCIUM SERPL-MCNC: 9 MG/DL (ref 8.5–10.1)
CHLORIDE SERPL-SCNC: 96 MMOL/L (ref 94–109)
CO2 SERPL-SCNC: 35 MMOL/L (ref 20–32)
CREAT SERPL-MCNC: 1.01 MG/DL (ref 0.52–1.04)
DIFFERENTIAL METHOD BLD: ABNORMAL
EOSINOPHIL # BLD AUTO: 0 10E9/L (ref 0–0.7)
EOSINOPHIL NFR BLD AUTO: 0 %
ERYTHROCYTE [DISTWIDTH] IN BLOOD BY AUTOMATED COUNT: 15.8 % (ref 10–15)
GFR SERPL CREATININE-BSD FRML MDRD: 50 ML/MIN/{1.73_M2}
GLUCOSE SERPL-MCNC: 131 MG/DL (ref 70–99)
HCT VFR BLD AUTO: 39.2 % (ref 35–47)
HGB BLD-MCNC: 11.4 G/DL (ref 11.7–15.7)
LYMPHOCYTES # BLD AUTO: 104.7 10E9/L (ref 0.8–5.3)
LYMPHOCYTES NFR BLD AUTO: 91 %
MCH RBC QN AUTO: 28.5 PG (ref 26.5–33)
MCHC RBC AUTO-ENTMCNC: 29.1 G/DL (ref 31.5–36.5)
MCV RBC AUTO: 98 FL (ref 78–100)
MONOCYTES # BLD AUTO: 1.2 10E9/L (ref 0–1.3)
MONOCYTES NFR BLD AUTO: 1 %
NEUTROPHILS # BLD AUTO: 9.2 10E9/L (ref 1.6–8.3)
NEUTROPHILS NFR BLD AUTO: 8 %
OVALOCYTES BLD QL SMEAR: SLIGHT
PLATELET # BLD AUTO: 199 10E9/L (ref 150–450)
PLATELET # BLD EST: ABNORMAL 10*3/UL
POTASSIUM SERPL-SCNC: 4.6 MMOL/L (ref 3.4–5.3)
RBC # BLD AUTO: 4 10E12/L (ref 3.8–5.2)
SODIUM SERPL-SCNC: 135 MMOL/L (ref 133–144)
TROPONIN I SERPL-MCNC: <0.015 UG/L (ref 0–0.04)
WBC # BLD AUTO: 115.1 10E9/L (ref 4–11)

## 2019-01-14 PROCEDURE — 84484 ASSAY OF TROPONIN QUANT: CPT | Performed by: PHYSICIAN ASSISTANT

## 2019-01-14 PROCEDURE — 25000125 ZZHC RX 250: Performed by: PHYSICIAN ASSISTANT

## 2019-01-14 PROCEDURE — 80048 BASIC METABOLIC PNL TOTAL CA: CPT | Performed by: PHYSICIAN ASSISTANT

## 2019-01-14 PROCEDURE — 71046 X-RAY EXAM CHEST 2 VIEWS: CPT

## 2019-01-14 PROCEDURE — 94640 AIRWAY INHALATION TREATMENT: CPT

## 2019-01-14 PROCEDURE — 99285 EMERGENCY DEPT VISIT HI MDM: CPT | Mod: 25

## 2019-01-14 PROCEDURE — 93005 ELECTROCARDIOGRAM TRACING: CPT

## 2019-01-14 PROCEDURE — 85025 COMPLETE CBC W/AUTO DIFF WBC: CPT | Performed by: PHYSICIAN ASSISTANT

## 2019-01-14 RX ORDER — IPRATROPIUM BROMIDE AND ALBUTEROL SULFATE 2.5; .5 MG/3ML; MG/3ML
3 SOLUTION RESPIRATORY (INHALATION) ONCE
Status: COMPLETED | OUTPATIENT
Start: 2019-01-14 | End: 2019-01-14

## 2019-01-14 RX ADMIN — IPRATROPIUM BROMIDE AND ALBUTEROL SULFATE 3 ML: .5; 3 SOLUTION RESPIRATORY (INHALATION) at 14:29

## 2019-01-14 ASSESSMENT — ENCOUNTER SYMPTOMS
COUGH: 1
SHORTNESS OF BREATH: 0

## 2019-01-14 ASSESSMENT — MIFFLIN-ST. JEOR: SCORE: 816.63

## 2019-01-14 NOTE — DISCHARGE INSTRUCTIONS
Follow-up with primary care provider in 2 days.  Make an appointment with pulmonology for follow-up.  Continue 2 L of oxygen at home.  Continue prednisone, Nebulizer and albuterol inhalers as prescribed.   Return to the emergency department for worsening fevers, shortness of breath, chest pain, or any other new/concerning symptoms.

## 2019-01-14 NOTE — ED AVS SNAPSHOT
[facility] Emergency Department  [address]  [facility] [address]  Phone:  [phone]  Fax:  [phone]                                    Marie Kline   MRN: [id number]    Department:  [facility] Emergency Department   Date of Visit:  1/14/2019           After Visit Summary Signature Page    I have received my discharge instructions, and my questions have been answered. I have discussed any challenges I see with this plan with the nurse or doctor.    ..........................................................................................................................................  Patient/Patient Representative Signature      ..........................................................................................................................................  Patient Representative Print Name and Relationship to Patient    ..................................................               ................................................  Date                                   Time    ..........................................................................................................................................  Reviewed by Signature/Title    ...................................................              ..............................................  Date                                               Time          22EPIC Rev 08/18

## 2019-01-14 NOTE — ED TRIAGE NOTES
Patient sent by home healthcare nurse since lung sounds abnormal wanting to be checked for pneumonia. Patient reports feeling fine but unable to keep oxygen levels up. Home oxygen use @ 2 liter nasal cannula. ABCs intact. Patient did not come with oxygen today.

## 2019-01-14 NOTE — TELEPHONE ENCOUNTER
Irish RN with [facility] left voice message for JEROMY Brice Care Coordinator. Patient completed Doxycycline from 1/7/19 ER visit and finishing up prednisone. Today patient continues to to need 2 L O2 and O2 saturations are only 92-93%.   Lung sounds - upper lungs clear, bases diminished. Irish is concerned about possible pneumonia.     Left voice message for Irish to call back to discuss further.

## 2019-01-14 NOTE — TELEPHONE ENCOUNTER
Irish, RN called back - she is with patient. Last week O2 saturations were 95-96 % and lung sounds clear in all fields. No fever present and she is coughing less now. She did try to take oxygen off and O2 saturations dropped to 85% so they put the O2 back on. She has been taking her nebulizer treatments and inhalers each day as ordered and completed them for today.     Recommended patient be seen again today for re-evaluation due to decreased O2 saturations and lung sound changes. Patient agrees with plan, she will call for a ride and go to the ER.     Dr. Kiser updated and agrees with recommendation from this RN.

## 2019-01-14 NOTE — ED PROVIDER NOTES
History     Chief Complaint:  Low O2 SATs     HPI   Marie Kline is a 86 year old female with a history of CLL, COPD, and CHF who presents to the emergency department today for evaluation of low O2 SATs. Per chart review, patient was in the ED 01/07 and diagnosed with COPD exacerbation and prescribed doxycycline and prednisone. The patient is on 2L of oxygen at home and today the patient states that she had low O2 saturations per her home care nurse. She endorses that she is still taking her doxycycline and prednisone. Additionally, the patient endorses a cough. She denies shortness of breath even with exertion, or chest pain.    Allergies:  Contrast dye  Adhesive tape     Medications:    Albuterol  Aspirin  Atorvastatin  Prolia  Symbicort  Furosemide  Neurontin  Duoneb  Levothyroxine  Lisinopril  Metoprolol succinate  Prednisone  Senokot  Zoloft  Tramadol  Trazodone  Doxycycline       Past Medical History:    Arthritis  Bladder cancer  Cardiomyopathy  CLL  CHF  COPD  Hypertension  Hypothyroid  Mumps  Myocardial infarction  Tricuspid valve disorders    Past Surgical History:    Right biopsy lymph node inguinal  Bladder surgery  Coronary angiography   CV left heart cath  Transurethral resection tumor bladder  Open reduction internal fixation left hip    Family History:    Mother: cerebrovascular disease  Father: cancer    Social History:  The patient was accompanied to the ED by friend.  Smoking Status: Former smoker  Smokeless Tobacco: Never Used  Alcohol Use: Negative  Drug Use: Negative  Marital Status:       Review of Systems   Respiratory: Positive for cough. Negative for shortness of breath.    Cardiovascular: Negative for chest pain.   All other systems reviewed and are negative.      Physical Exam     Patient Vitals for the past 24 hrs:   BP Temp Temp src Pulse Resp SpO2 Height Weight   01/14/19 1535 109/59 -- -- 92 -- 95 % -- --   01/14/19 1534 109/59 -- -- 92 -- 94 % -- --   01/14/19 1533 -- -- --  "-- -- 94 % -- --   01/14/19 1525 -- -- -- -- -- 94 % -- --   01/14/19 1520 -- -- -- -- -- 95 % -- --   01/14/19 1515 -- -- -- 79 -- 96 % -- --   01/14/19 1510 -- -- -- -- -- 94 % -- --   01/14/19 1505 -- -- -- -- -- 95 % -- --   01/14/19 1500 -- -- -- 74 -- 94 % -- --   01/14/19 1455 -- -- -- -- -- 95 % -- --   01/14/19 1450 -- -- -- -- -- 95 % -- --   01/14/19 1445 -- -- -- 68 -- 95 % -- --   01/14/19 1440 -- -- -- -- -- 98 % -- --   01/14/19 1435 -- -- -- -- -- 99 % -- --   01/14/19 1430 104/71 -- -- 75 -- 95 % -- --   01/14/19 1400 -- -- -- -- -- 95 % -- --   01/14/19 1355 -- -- -- -- -- 95 % -- --   01/14/19 1350 -- -- -- -- -- 95 % -- --   01/14/19 1345 95/65 -- -- 86 -- 95 % -- --   01/14/19 1340 92/52 -- -- 75 -- 93 % -- --   01/14/19 1335 -- -- -- -- -- (!) 88 % -- --   01/14/19 1302 112/61 98.2  F (36.8  C) Temporal 77 20 91 % 1.499 m (4' 11\") 47.1 kg (103 lb 13.4 oz)       Physical Exam  General: Alert, interactive. GCS 15. Sporadic dry cough.   Head:  Scalp is atraumatic.  Eyes:  EOM intact. The pupils are equal, round, and reactive to light. No scleral icterus.  ENT:                                      Ears:  The external ears are normal.  Nose:  The external nose is normal.  Throat:  The oropharynx is normal. Mucus membranes are moist.                 Neck:  Normal range of motion. There is no rigidity.   CV:  Regular rate and rhythm. No murmur. 2+ radial pulses  Resp:  Breath sounds are clear bilaterally. Non-labored, no retractions or accessory muscle use. No wheezing.   GI:  Abdomen is soft, no distension, no tenderness.   MS:  Normal range of motion.   Skin:  Warm and dry.   Neuro:  Strength and sensation grossly intact.   Psych:  Awake. Alert.  Appropriate interactions.     Emergency Department Course     ECG:  ECG taken at 1354  Sinus rhythm with 1st degree block with premature atrial complexes  Otherwise normal ECG  Rate 81 bpm. MO interval 210 ms. QRS duration 88 ms. QT/QTc 364/422 ms. P-R-T " axes 64 71 61.    Imaging:  Radiology findings were communicated with the patient who voiced understanding of the findings.    Chest XR,  PA & LAT  Thoracolumbar scoliosis. Probable COPD. Stable exam  compared to 1/7/2019 and 1/29/2018..  ROSALIA GUAN MD  Reading per radiology    Laboratory:  Laboratory findings were communicated with the patient who voiced understanding of the findings.    Troponin (Collected 1336): <0.015  CBC: .1(HH), HGB 11.4(L),   BMP: carbon dioxide 35(H), glucose 131(H), GFR estimate 50(L) o/w WNL (Creatinine 1.01)    Interventions:  1429 Duoneb 3ml nebulization     Emergency Department Course:    1311 Nursing notes and vitals reviewed.    1329 I performed an exam of the patient as documented above.     1336 IV was inserted and blood was drawn for laboratory testing, results above.    1422 The patient was sent for a chest XR while in the emergency department, results above.     1530 Recheck and update.    1558 I personally reviewed the lab and image results with the patient and answered all related questions prior to discharge.    Impression & Plan      Medical Decision Making:  Marie Kline is a 86 year old female who presents to the emergency department today for evaluation of low O2 sats at her nursing facility. Nurse sent her here for concern of pneumonia. Patient reports history of COPD and has plans to schedule appointment with pulmonology, but has not done so yet. History of CLL and , consistent with past values. Otherwise cbc and bmp overall unremarkable. EKG negative for signs of ischemia and troponin negative. Doubt ACS. She has no chest pain or subjective shortness of breath to suggest PE. Plan to continue prednsione taper and doxycycline and follow up closely with pulmonology. O2 between 94-95% with 2 liters O2. Recommended continuing 2 liters of O2 oxygen until follow up with pulmonology. Patient agrees with the plan and all questions/concerns addressed  prior to discharge home.     Diagnosis:    ICD-10-CM    1. History of COPD Z87.09    2. Hypoxia R09.02      Disposition:   The patient is discharged to home.    Discharge Medications:  No discharge medications     Scribe Disclosure:  I, Joselin Jones, am serving as a scribe at 3:05 PM on 1/14/2019 to document services personally performed by Cherise Ibanez PA-C based on my observations and the provider's statements to me.    [facility] EMERGENCY DEPARTMENT       Cherise Ibanez PA-C  01/16/19 1142

## 2019-01-15 ENCOUNTER — TELEPHONE (OUTPATIENT)
Dept: INTERNAL MEDICINE | Facility: CLINIC | Age: 84
End: 2019-01-15

## 2019-01-15 ENCOUNTER — PATIENT OUTREACH (OUTPATIENT)
Dept: CARE COORDINATION | Facility: CLINIC | Age: 84
End: 2019-01-15

## 2019-01-15 LAB — INTERPRETATION ECG - MUSE: NORMAL

## 2019-01-15 ASSESSMENT — ACTIVITIES OF DAILY LIVING (ADL): DEPENDENT_IADLS:: INDEPENDENT

## 2019-01-15 NOTE — PROGRESS NOTES
RN Clinic Care Coordination Contact  ED Follow up      Outreach to patient who was seen in ED on 1/14/19 for evaluation of Low O2 saturations.  Home Care RN had sent patient in as she was concerned for possible PNA.  Workup was negative for PNA.  Patient prescribed Duoneb and sent home.    Rn CC spoke with patient today and states she is feeling fine today.  She had just competed a neb treatment and was coughing up phlegm.  Patient stated PCP had recommended she follow up with [facility], Dr. Courtney, but hadn't scheduled yet.  Writer encouraged her to call and set up appointment.  Phone number for [facility] given to patient.    Home care services will resume.  Patient states physical therapy will be in to see her today.    .  Home Care Contact:              Home Care Agency: [facility] RN/PT/OT BECKY Coburn RN   [phone]  Fax: [phone]    No other concerns at this time.  It was verified patient has contact info and she was encouraged to call with any questions or concerns.    Plan: RN/SW Care Coordinator will await notification from home care staff informing RN/SW Care Coordinator of patients discharge plans/needs. RN/SW Care Coordinator will review chart and outreach to home care every 4 weeks and as needed    Babs Garcia RN-BSN   Care Coordination  Phone:  [phone]  Email: [email or web address]

## 2019-01-21 ENCOUNTER — ALLIED HEALTH/NURSE VISIT (OUTPATIENT)
Dept: PHARMACY | Facility: CLINIC | Age: 84
End: 2019-01-21
Payer: COMMERCIAL

## 2019-01-21 DIAGNOSIS — I50.22 CHRONIC SYSTOLIC CONGESTIVE HEART FAILURE (H): Primary | ICD-10-CM

## 2019-01-21 DIAGNOSIS — I10 ESSENTIAL HYPERTENSION WITH GOAL BLOOD PRESSURE LESS THAN 140/90: ICD-10-CM

## 2019-01-21 DIAGNOSIS — J44.1 COPD EXACERBATION (H): ICD-10-CM

## 2019-01-21 DIAGNOSIS — E78.5 HYPERLIPIDEMIA WITH TARGET LDL LESS THAN 130: ICD-10-CM

## 2019-01-21 DIAGNOSIS — D64.9 ANEMIA, UNSPECIFIED TYPE: ICD-10-CM

## 2019-01-21 DIAGNOSIS — G47.00 INSOMNIA, UNSPECIFIED TYPE: ICD-10-CM

## 2019-01-21 PROCEDURE — 99607 MTMS BY PHARM ADDL 15 MIN: CPT | Performed by: PHARMACIST

## 2019-01-21 PROCEDURE — 99605 MTMS BY PHARM NP 15 MIN: CPT | Performed by: PHARMACIST

## 2019-01-21 NOTE — PROGRESS NOTES
SUBJECTIVE/OBJECTIVE:                Marie Kline is a 86 year old female called for a follow-up transitions of care visit.  She was discharged from [facility] on 12/7/18 for Stress-induced cardiomyopathy (NSTEMI).  Since our last visit on 12/17/18 she has been to the ED twice (1/7/19 and 1/14/19) for COPD exacerbation.  First visit in 2019.    Chief Complaint: Last MTM on 12/17/18.     Allergies/ADRs: Reviewed in Epic  Tobacco: No tobacco use  Alcohol: not currently using  Caffeine: decaff coffee  Activity: reports bouts of fatigue; active at home with dog - walks up to 20 minutes   PMH: Reviewed in Epic - CLL (was on IVIG, f/u Farzad with [facility])    Medication Adherence: sets up own pill box    Cardiomyopathy/HFrEF/HTN: Current medications include metoprolol succinate 25 mg daily, furosemide 20 mg once daily, aspirin 325 mg daily. Also, has home oxygen used prn SOB with exertion. Not on an ACEI or ARB. Patient reports no current medication side effects.   ECHO:  Date 12/8/18, EF 30-35%  Pt is complaining of sx of HF - none  Pt is measuring daily weights - reports stable  Patient does not self-monitor BP.   No concerns with hypotensive symptoms.  BP Readings from Last 3 Encounters:   01/14/19 109/59   01/07/19 125/66   12/19/18 110/70       Hyperlipidemia: Current therapy includes atorvastatin 20 mg daily.  Has been on statin for some time. No concerns noted.   Recent Labs   Lab Test 03/12/18  1009 09/12/17 10/09/14  0350   CHOL 151 145 167   HDL 47* 44 55   LDL 84 82 88   TRIG 98 95 121   CHOLHDLRATIO  --   --  3.0       Insomnia: Current medications include: trazodone 100 mg every night.  Reports sleep last night was 'wonderful'.  No morning grogginess or side effects.     Anemia: Taking ferrous sulfate once daily.  She had stopped this previously due to dark stools, but she is now aware that this can be a side effect of iron therapy.   Hemoglobin   Date Value Ref Range Status   01/14/2019 11.4 (L) 11.7 - 15.7  g/dL Final     No results found for: VISH, IRON, FEB    COPD: Current medications: Albuterol MDI as needed and Nebs BID, and budesonide-formoterol (Symbicort) MDI 2 puffs  BID.  Rinses her mouth after use of the Symbicort.  Completed course of abx and prednisone as prescribed.  Checks oxygen level at home; uses oxygen if O2 sats <90%.  Reports O2 sats this morning was 93%.  Pt is not experiencing side effects.   Pt reports the following symptoms: none at this time  Pt does not have an COPD Action Plan on file.   Has spirometry been completed: Yes     Current labs include:  BP Readings from Last 3 Encounters:   01/14/19 109/59   01/07/19 125/66   12/19/18 110/70     Today's Vitals: phone visit    Component Value Date    CR 1.21 09/12/2017     GFR Estimate   Date Value Ref Range Status   01/14/2019 50 (L) >60 mL/min/[1.73_m2] Final     Comment:     Non  GFR Calc  Starting 12/18/2018, serum creatinine based estimated GFR (eGFR) will be   calculated using the Chronic Kidney Disease Epidemiology Collaboration   (CKD-EPI) equation.     01/07/2019 50 (L) >60 mL/min/[1.73_m2] Final     Comment:     Non  GFR Calc  Starting 12/18/2018, serum creatinine based estimated GFR (eGFR) will be   calculated using the Chronic Kidney Disease Epidemiology Collaboration   (CKD-EPI) equation.     12/19/2018 51 (L) >60 mL/min/[1.73_m2] Final     Comment:     Non  GFR Calc  Starting 12/18/2018, serum creatinine based estimated GFR (eGFR) will be   calculated using the Chronic Kidney Disease Epidemiology Collaboration   (CKD-EPI) equation.       Estimated Creatinine Clearance: 29.7 mL/min (based on SCr of 1.01 mg/dL).    Most Recent Immunizations   Administered Date(s) Administered     HepA-Adult 04/08/2009     HepB-Adult 04/08/2009     Influenza (High Dose) 3 valent vaccine 09/21/2017     Influenza (IIV3) PF 09/24/2014     Influenza (intradermal) 09/24/2013     Mantoux Tuberculin Skin Test  02/22/2016     Pneumo Conj 13-V (2010&after) 09/22/2015     Pneumococcal 23 valent 09/24/2013     Tdap (Adacel,Boostrix) 07/10/2014     Tdap (Adult) Unspecified Formulation 07/10/2014       ASSESSMENT:                             Current medications were reviewed today.     Medication Adherence: no issues identified    Cardiomyopathy/HFrEF/HTN: stable    Hyperlipidemia: stable    Insomnia: stable    Anemia: improved.  Continue current regimen and recheck iron/Hgb labs in March.    COPD: improved.  No symptoms at this time.  Continue current regimen    PLAN:                            1.Continue current regimen    I spent 30 minutes with this patient today. I offer these suggestions for consideration by cc'd chart to the PCP. A copy of the visit note was provided to the patient's primary care provider.    Will follow up in 1 month.      Yuliana Cast , Pharm D  [phone] (phone)  [phone] (pager)  Medication Therapy Management Pharmacist

## 2019-01-23 ENCOUNTER — TELEPHONE (OUTPATIENT)
Dept: INTERNAL MEDICINE | Facility: CLINIC | Age: 84
End: 2019-01-23

## 2019-01-25 ENCOUNTER — TRANSFERRED RECORDS (OUTPATIENT)
Dept: HEALTH INFORMATION MANAGEMENT | Facility: CLINIC | Age: 84
End: 2019-01-25

## 2019-01-25 ENCOUNTER — TELEPHONE (OUTPATIENT)
Dept: INTERNAL MEDICINE | Facility: CLINIC | Age: 84
End: 2019-01-25

## 2019-01-25 NOTE — TELEPHONE ENCOUNTER
Yareli from [facility] called [phone].  She needs physicians comm and interim orderders for the following dates. Farzad 7,10,21,23.  Please fax them to [phone] or [phone]

## 2019-02-04 ENCOUNTER — TELEPHONE (OUTPATIENT)
Dept: INTERNAL MEDICINE | Facility: CLINIC | Age: 84
End: 2019-02-04

## 2019-02-18 ENCOUNTER — TRANSFERRED RECORDS (OUTPATIENT)
Dept: HEALTH INFORMATION MANAGEMENT | Facility: CLINIC | Age: 84
End: 2019-02-18

## 2019-02-19 ENCOUNTER — ALLIED HEALTH/NURSE VISIT (OUTPATIENT)
Dept: PHARMACY | Facility: CLINIC | Age: 84
End: 2019-02-19
Payer: COMMERCIAL

## 2019-02-19 DIAGNOSIS — I10 ESSENTIAL HYPERTENSION WITH GOAL BLOOD PRESSURE LESS THAN 140/90: ICD-10-CM

## 2019-02-19 DIAGNOSIS — C91.10 CLL (CHRONIC LYMPHOCYTIC LEUKEMIA) (H): ICD-10-CM

## 2019-02-19 DIAGNOSIS — D64.9 ANEMIA, UNSPECIFIED TYPE: ICD-10-CM

## 2019-02-19 DIAGNOSIS — F41.1 GAD (GENERALIZED ANXIETY DISORDER): ICD-10-CM

## 2019-02-19 DIAGNOSIS — R60.0 LOCALIZED EDEMA: ICD-10-CM

## 2019-02-19 DIAGNOSIS — J44.1 COPD EXACERBATION (H): ICD-10-CM

## 2019-02-19 DIAGNOSIS — E03.9 HYPOTHYROIDISM, UNSPECIFIED TYPE: ICD-10-CM

## 2019-02-19 DIAGNOSIS — G47.00 INSOMNIA, UNSPECIFIED TYPE: ICD-10-CM

## 2019-02-19 DIAGNOSIS — E78.5 HYPERLIPIDEMIA WITH TARGET LDL LESS THAN 130: ICD-10-CM

## 2019-02-19 DIAGNOSIS — I50.22 CHRONIC SYSTOLIC CONGESTIVE HEART FAILURE (H): Primary | ICD-10-CM

## 2019-02-19 PROCEDURE — 99607 MTMS BY PHARM ADDL 15 MIN: CPT | Performed by: PHARMACIST

## 2019-02-19 PROCEDURE — 99606 MTMS BY PHARM EST 15 MIN: CPT | Performed by: PHARMACIST

## 2019-02-19 RX ORDER — ALBUTEROL SULFATE 0.83 MG/ML
2.5 SOLUTION RESPIRATORY (INHALATION) EVERY 6 HOURS PRN
COMMUNITY
End: 2019-11-13

## 2019-02-19 RX ORDER — FUROSEMIDE 20 MG
20 TABLET ORAL DAILY
Qty: 90 TABLET | Refills: 1 | Status: SHIPPED | OUTPATIENT
Start: 2019-02-19 | End: 2019-05-20

## 2019-02-19 NOTE — PROGRESS NOTES
SUBJECTIVE/OBJECTIVE:                Marie Kline is a 86 year old female called for a follow-up transitions of care visit.  She was discharged from [facility] on 12/7/18 for Stress-induced cardiomyopathy (NSTEMI).  She has been to the ED twice (1/7/19 and 1/14/19) for COPD exacerbation.    Chief Complaint: Last MTM on 1/21/19.     Allergies/ADRs: Reviewed in Epic  Tobacco: No tobacco use  Alcohol: not currently using  Caffeine: decaff coffee   PMH: Reviewed in Epic - CLL (was on IVIG, f/u Farzad with [facility])    Medication Adherence: sets up own pill box    Cardiomyopathy/HFrEF/HTN: Current medications include metoprolol succinate 25 mg daily, furosemide 20 mg once daily, aspirin 325 mg daily and lisinopril 2.5 mg daily. Also, has home oxygen used prn SOB with exertion. Not on an ACEI or ARB. Patient reports no current medication side effects.   ECHO:  Date 12/8/18, EF 30-35%  Pt is complaining of sx of HF - none  Pt is measuring daily weights - reports stable, 101-104lbs  Patient does not self-monitor BP.   No concerns with hypotensive symptoms.  BP Readings from Last 3 Encounters:   01/14/19 109/59   01/07/19 125/66   12/19/18 110/70       Hyperlipidemia: Current therapy includes atorvastatin 20 mg daily.  Has been on statin for some time. No concerns noted.   Recent Labs   Lab Test 03/12/18  1009 09/12/17 10/09/14  0350   CHOL 151 145 167   HDL 47* 44 55   LDL 84 82 88   TRIG 98 95 121   CHOLHDLRATIO  --   --  3.0       Insomnia: Current medications include: trazodone 100 mg every night.  Reports sleep has been much better.  No morning grogginess or side effects.     Anemia: Taking ferrous sulfate 325 mg once daily.    Hemoglobin   Date Value Ref Range Status   01/14/2019 11.4 (L) 11.7 - 15.7 g/dL Final     No results found for: VISH, IRON, FEB    COPD: Current medications: Albuterol MDI as needed and Nebs BID, and budesonide-formoterol (Symbicort) MDI 2 puffs  BID.  Rinses her mouth after use of the  Symbicort.  Completed course of abx and prednisone as prescribed.  Checks oxygen level at home and uses at night to sleep if needed.  Maybe needed every other night.  Oxygen may drop down to 88% with activity, able to bring it up to 92% with nebulizer.      Pt is not experiencing side effects.   Pt reports the following symptoms: none at this time  Pt does not have an COPD Action Plan on file.   Has spirometry been completed: Yes   Followed by Dr. Courtney - last seen on 1/25/19, was recommended to finish Symbicort and change to Anoro.  Today patient reports unable to afford Anora $400 per month; she did inform Dr. Courtney about this.    Osteoporosis: Current therapy includes: denosumab (Prolia) SC injections, every 6 months and calcium once daily (she wasn't sure if she takes this more than once daily). Pt is experiencing side effects - teeth have been breaking off; has lost 8 teeth since Oct.  Has been recommended to stop Prolia by Oncologist.  Pt is getting the following sources of dietary calcium: milk and cheese  DEXA History: -2.3 from 9/27/16  Serum creatinine: 1.01 mg/dL 01/14/19 1336  Estimated creatinine clearance: 29.7 mL/min    Leukemia:  Having infusions every 4 months.  Reports most recent labs, WBC has drastically improved.  Followed by [facility].    Hypothyroidism: Patient is taking levothyroxine 75 mcg daily. Patient is having the following symptoms: none.   TSH   Date Value Ref Range Status   12/09/2018 7.36 (H) 0.40 - 4.00 mU/L Final     T4 Free   Date Value Ref Range Status   12/09/2018 1.15 0.76 - 1.46 ng/dL Final     Anxiety:  Taking sertraline 25 mg daily.  This has been effective.  No concerns with side effects.      Current labs include:  BP Readings from Last 3 Encounters:   01/14/19 109/59   01/07/19 125/66   12/19/18 110/70     Today's Vitals: phone visit    GFR Estimate   Date Value Ref Range Status   01/14/2019 50 (L) >60 mL/min/[1.73_m2] Final     Comment:     Non  GFR  Calc  Starting 12/18/2018, serum creatinine based estimated GFR (eGFR) will be   calculated using the Chronic Kidney Disease Epidemiology Collaboration   (CKD-EPI) equation.     01/07/2019 50 (L) >60 mL/min/[1.73_m2] Final     Comment:     Non  GFR Calc  Starting 12/18/2018, serum creatinine based estimated GFR (eGFR) will be   calculated using the Chronic Kidney Disease Epidemiology Collaboration   (CKD-EPI) equation.     12/19/2018 51 (L) >60 mL/min/[1.73_m2] Final     Comment:     Non  GFR Calc  Starting 12/18/2018, serum creatinine based estimated GFR (eGFR) will be   calculated using the Chronic Kidney Disease Epidemiology Collaboration   (CKD-EPI) equation.       Estimated Creatinine Clearance: 29.7 mL/min (based on SCr of 1.01 mg/dL).    Most Recent Immunizations   Administered Date(s) Administered     HepA-Adult 04/08/2009     HepB-Adult 04/08/2009     Influenza (High Dose) 3 valent vaccine 09/21/2017     Influenza (IIV3) PF 09/24/2014     Influenza (intradermal) 09/24/2013     Mantoux Tuberculin Skin Test 02/22/2016     Pneumo Conj 13-V (2010&after) 09/22/2015     Pneumococcal 23 valent 09/24/2013     Tdap (Adacel,Boostrix) 07/10/2014     Tdap (Adult) Unspecified Formulation 07/10/2014       ASSESSMENT:                             Current medications were reviewed today.   Post Discharge Medication Reconciliation Status: discharge medications reconciled, continue medications without change.      Medication Adherence: no issues identified    Cardiomyopathy/HFrEF/HTN: stable    Hyperlipidemia: stable    Insomnia: stable    Anemia: stable    COPD: pt followed by Pulmonary.  She has notified them that the Anoro is not affordable; may consider another option within the LAMA/LABA class.    Osteoporosis: will notify Dr. Kiser of recent concern with teeth and recommendation to stop Prolia.  She will continue calcium supplement.      Leukemia:  Stable.  Followed by [facility]  [phone]    Hypothyroidism: recheck labs    Anxiety:  stable    PLAN:                            1.  Refilled furosemide  2.  Recheck TSH - sent order to [facility]; called [facility], they are not able to add this order.  Will order for future to have done here.  3.  Will notify Dr. Kiser about teeth and recommendation on Prolia      I spent 35 minutes with this patient today. I offer these suggestions for consideration by cc'd chart to the PCP. A copy of the visit note was provided to the patient's primary care provider.    Will follow up in 2 months      Yuliana Cast , [place] D  [phone] (phone)  [phone] (pager)  Medication Therapy Management Pharmacist

## 2019-02-21 ENCOUNTER — TELEPHONE (OUTPATIENT)
Dept: INTERNAL MEDICINE | Facility: CLINIC | Age: 84
End: 2019-02-21

## 2019-02-21 NOTE — TELEPHONE ENCOUNTER
FYI~ Feb 21, 2019 I spoke with Marie, she is in need of financial assistance for medication.    We reviewed the Prescription Assistance Program for manfacturer brand name assistance programs, gross income, insurance and Rx list.  Marie is over income for the Pharmacy Assistance Fund.    The [employer] application for Proventil HFA will be completed.    I will contact Dr. Kiser for an alternative for Symbicort.  Marie states she has blisters in her mouth and is 'afraid' of the Symbicort.  I will send a list of assistance program inhalers to Dr. Kiser for consideration.    Rupali Lewis  Prescription

## 2019-02-26 ENCOUNTER — TELEPHONE (OUTPATIENT)
Dept: INTERNAL MEDICINE | Facility: CLINIC | Age: 84
End: 2019-02-26

## 2019-02-26 NOTE — TELEPHONE ENCOUNTER
When I spoke with Marie on Feb 21, she stated she didn't want to use the Symbicort any longer due to mouth blisters.    Based on the income we discussed, there are a few programs she may qualify for, some require an out of pocket expense.     assistance programs I know of are:  Dulera, Asmanex Twisthaler, Tudorza, Bevespi, Advair, Arnuity, Incruse, Trelegy, Breo and Anoro.  Any of the [employer] products she is over income for.    Thoughts?    Thanks,   Rupali

## 2019-02-27 NOTE — TELEPHONE ENCOUNTER
I'll contact Marie to let her know. After I chat with her I'll check with you for the medication name and the dose information for the application I will need.    Thanks so much!    Simone

## 2019-03-02 DIAGNOSIS — J44.1 COPD EXACERBATION (H): ICD-10-CM

## 2019-03-04 NOTE — TELEPHONE ENCOUNTER
"Requested Prescriptions   Pending Prescriptions Disp Refills     BREO ELLIPTA 200-25 MCG/INH Inhaler [Pharmacy Med Name: Breo Ellipta Inhalation Aerosol Powder Breath Activated 200-25 MCG/INH]  0    Last Written Prescription Date:  Not on meds list  Last Fill Quantity: n/a,  # refills: n/a   Last office visit: 12/19/2018 with prescribing provider:     Future Office Visit:   Sig: Inhale 1 puff into the lungs daily    Inhaled Steroids Protocol Failed - 3/2/2019 10:27 AM       Failed - Medication is active on med list       Passed - Patient is age 12 or older       Passed - Recent (12 mo) or future (30 days) visit within the authorizing provider's specialty    Patient had office visit in the last 12 months or has a visit in the next 30 days with authorizing provider or within the authorizing provider's specialty.  See \"Patient Info\" tab in inbasket, or \"Choose Columns\" in Meds & Orders section of the refill encounter.              "

## 2019-03-05 NOTE — TELEPHONE ENCOUNTER
Routing refill request to provider for review/approval because:  Drug not active on patient's medication list.    See telephone encounter 2/26/19.    Please advise, thanks.

## 2019-03-06 ENCOUNTER — PATIENT OUTREACH (OUTPATIENT)
Dept: CARE COORDINATION | Facility: CLINIC | Age: 84
End: 2019-03-06

## 2019-03-06 ASSESSMENT — ACTIVITIES OF DAILY LIVING (ADL): DEPENDENT_IADLS:: INDEPENDENT

## 2019-03-06 NOTE — PROGRESS NOTES
"Clinic Care Coordination Contact  RN CC follow up    Spoke with patient briefly over phone.  Patient explained she just returned from dentist and had 4 teeth extracted and it is difficult to speak.    Patient stated she is doing well.  No new complaints.    She has been discharged from home care services.  In chart review it is noted patient has followed up with pulmonology and oncology.    RN CC will follow up with patient again in a few weeks to check on her status.  Patient stated she will call if \"anything comes up\" before then.    Babs Garcia RN-BSN   Care Coordination  Phone:  [phone]  Email: [email or web address]      "

## 2019-03-08 ENCOUNTER — TELEPHONE (OUTPATIENT)
Dept: INTERNAL MEDICINE | Facility: CLINIC | Age: 84
End: 2019-03-08

## 2019-03-08 DIAGNOSIS — J44.1 COPD EXACERBATION (H): Primary | ICD-10-CM

## 2019-03-08 NOTE — TELEPHONE ENCOUNTER
I spoke with Marie, she would like to use Dulera instead of the Breo.  There is no medication out of pocket expense required for that program.    What would the dose information be for Dulera?    Thanks,    Rupali Lewis  Prescription

## 2019-03-11 ENCOUNTER — TELEPHONE (OUTPATIENT)
Dept: INTERNAL MEDICINE | Facility: CLINIC | Age: 84
End: 2019-03-11

## 2019-03-11 DIAGNOSIS — Z78.0 MENOPAUSE: ICD-10-CM

## 2019-03-11 DIAGNOSIS — M81.0 SENILE OSTEOPOROSIS: Primary | ICD-10-CM

## 2019-03-11 NOTE — TELEPHONE ENCOUNTER
Patient will be due 4-11-19 for next Prolia injection (last injection 10-11-18).    Spoke with Jolly @ [employer] ([phone]) to start insurance verification.

## 2019-03-13 DIAGNOSIS — M81.0 SENILE OSTEOPOROSIS: Primary | ICD-10-CM

## 2019-03-13 NOTE — PATIENT INSTRUCTIONS
PROLIA  Risk Evaluation and Mitigation Strategy Best Practice Advisory    In May 2015, the FDA required an update to the PROLIA  (denosumab) REMS (Risk Evaluation and Mitigation Strategy) to inform both providers and patients about potential serious risks related to PROLIA .    These potential serious risks include:    Hypocalcemia    Osteonecrosis of the jaw    Atypical femoral fractures    Serious Infections    Dermatologic reactions    To ensure compliance with these requirements [facility] has developed a workflow for those patients who will receive PROLIA  at clinic.  This workflow includes insurance verification, patient scheduling, patient education, and documentation. Registered Nurses in the clinic should have completed eLearning related to the REMS and the clinic workflow.  Refer to them to initiate this process.  This workflow does not need to be executed if the patient is to receive PROLIA  injection at [facility].       The  has put together a Patient Education Toolkit.  Copies of these handouts may be available your clinic. Patients must receive the Patient Brochure and Medication Guide when receiving injection at clinic.  These will be attached to the injection ordered from [facility] [employer] to the clinic. Links to handouts:  Patient Counseling Chart for Healthcare Providers  Patient Brochure  Prescribing Information  Medication Guide    If you are having trouble accessing the above links, these documents can be found at: http://[email or web address].[email or web address]    The role of healthcare provider includes reviewing patient education materials with the patient, advising patients to seek medical attention if they have signs or symptoms of one of the serious risks, and provide patients a copy of the Patient Brochure and Medication Guide when receiving their injection.      Due to the risk of hypocalcemia the Prescribing  Information for PROLIA  recommends that calcium and mineral levels (magnesium and phosphorus) be checked within 14 days of injection for those predisposed to hypocalcemia.

## 2019-03-13 NOTE — TELEPHONE ENCOUNTER
[employer] insurance verification received.   Prolia is covered, PA not needed.    Last Dexa done 9/27/16. Routed to provider to review if patient needs to have repeat Dexa before Prolia injection or okay to proceed with next Prolia injection in April.

## 2019-03-14 NOTE — TELEPHONE ENCOUNTER
Contacted patient, informed her that Dr. Kiser would like her to get Dexa scan prior to getting next Prolia.    Patient states she does not want to take Prolia anymore. Patient states she has several upper teeth fall out since starting Prolia - she said a doctor told her that this is a side effect of Prolia (does not remember which provider told her this). She is going to have a dental plate placed for this.     Patient requesting to schedule appointment to discuss further. Future appointment scheduled for 4/10/19, she will try to get Dexa prior to appointment with Dr. Kiser.

## 2019-03-18 ENCOUNTER — TRANSFERRED RECORDS (OUTPATIENT)
Dept: HEALTH INFORMATION MANAGEMENT | Facility: CLINIC | Age: 84
End: 2019-03-18

## 2019-03-19 ENCOUNTER — TELEPHONE (OUTPATIENT)
Dept: INTERNAL MEDICINE | Facility: CLINIC | Age: 84
End: 2019-03-19

## 2019-03-27 NOTE — TELEPHONE ENCOUNTER
[facility] in [place] on [address], Al sheldon pharmacy intern called and said the dulera is non-formulary and needs a prior authorization.      Settle the PA with pt insurance and Dr Mann Then can order another one or let pt know it will be out of pocket.     Krista Casey  Pt Service Rep

## 2019-03-27 NOTE — TELEPHONE ENCOUNTER
Al from [facility] pharmacy in [place] states that the pt does not want Dulera pt wants   BREO ELLIPTA 200-25 MCG/INH Inhaler       Al PH: [phone]

## 2019-04-02 ENCOUNTER — PATIENT OUTREACH (OUTPATIENT)
Dept: CARE COORDINATION | Facility: CLINIC | Age: 84
End: 2019-04-02

## 2019-04-02 NOTE — PROGRESS NOTES
Clinic Care Coordination Contact      Received VM from patient stating she is having difficulty refilling her Rx for Dulera.  Chart reviewed.  An application for the prescription assistance program for Dulera and Proventil were completed and sent to Rupali Lewis, prescriptions  on 3/27.    RN CC returned call to patient.  Patient was able to call and speak to Rupali yesterday and was told she is approved for the program.  Patient's pharmacy has not yet received approval.  Writer stated it may take a day or two for pharmacy to receive approval.  Patient verbalized understanding.    Writer asked if she has any other concerns.  Patient stated she is concerned about her hair falling out.  She believes its related to stress, patient is feeling stressed about her teeth and fact she is having more teeth pulled tomorrow.    Patient has PCP follow up scheduled for 4/10.  Patient would like to meet face to face.      RN CC will plan to meet with patient face to face on 4/10 to discuss care coordination enrollment.    Babs Garcia RN-BSN   Care Coordination  Phone:  [phone]  Email: [email or web address]

## 2019-04-16 ENCOUNTER — ALLIED HEALTH/NURSE VISIT (OUTPATIENT)
Dept: PHARMACY | Facility: CLINIC | Age: 84
End: 2019-04-16
Payer: COMMERCIAL

## 2019-04-16 ENCOUNTER — TELEPHONE (OUTPATIENT)
Dept: PHARMACY | Facility: CLINIC | Age: 84
End: 2019-04-16

## 2019-04-16 DIAGNOSIS — J44.1 COPD EXACERBATION (H): ICD-10-CM

## 2019-04-16 DIAGNOSIS — D64.9 ANEMIA, UNSPECIFIED TYPE: ICD-10-CM

## 2019-04-16 DIAGNOSIS — G47.00 INSOMNIA, UNSPECIFIED TYPE: ICD-10-CM

## 2019-04-16 DIAGNOSIS — E03.9 HYPOTHYROIDISM, UNSPECIFIED TYPE: ICD-10-CM

## 2019-04-16 DIAGNOSIS — S72.002A FRACTURE OF HIP, LEFT, CLOSED, INITIAL ENCOUNTER (H): ICD-10-CM

## 2019-04-16 DIAGNOSIS — C91.10 CLL (CHRONIC LYMPHOCYTIC LEUKEMIA) (H): ICD-10-CM

## 2019-04-16 DIAGNOSIS — E78.5 HYPERLIPIDEMIA WITH TARGET LDL LESS THAN 130: Primary | ICD-10-CM

## 2019-04-16 DIAGNOSIS — M81.0 SENILE OSTEOPOROSIS: ICD-10-CM

## 2019-04-16 PROCEDURE — 99606 MTMS BY PHARM EST 15 MIN: CPT | Performed by: PHARMACIST

## 2019-04-16 PROCEDURE — 99607 MTMS BY PHARM ADDL 15 MIN: CPT | Performed by: PHARMACIST

## 2019-04-16 NOTE — PROGRESS NOTES
SUBJECTIVE/OBJECTIVE:                Marie Kline is a 86 year old female called for a follow-up transitions of care visit.  She was discharged from [facility] on 12/7/18 for Stress-induced cardiomyopathy (NSTEMI).  She has been to the ED twice (1/7/19 and 1/14/19) for COPD exacerbation.    Chief Complaint: Last MTM on 2/19/19. Waiting on prescription assistance for inhalers    Allergies/ADRs: Reviewed in Epic  Tobacco: No tobacco use  Alcohol: not currently using  Caffeine: decaff coffee   PMH: Reviewed in Epic - CLL (was on IVIG, f/u Farzad with [facility])    Medication Adherence: sets up own pill box      Insomnia: Current medications include: trazodone 100 mg every night.  Reports sleep has been much better.  No morning grogginess or side effects.     Anemia: Taking ferrous sulfate 325 mg once daily.  No concerns with side effects.  Hemoglobin   Date Value Ref Range Status   01/14/2019 11.4 (L) 11.7 - 15.7 g/dL Final     No results found for: VISH, IRON, FEB    COPD: Current medications: Albuterol MDI as needed and Nebs BID.  Stopped Symbicort due to thrush; she was rinsing her mouth and using spacer.  Working with Prescription Assistance to get Dulera covered.  Rinses her mouth after use of the Symbicort and still got thrush.  Completed course of abx and prednisone as prescribed.  Oxygen 90s at home; not needing oxygen supplement.  Pt is not experiencing side effects.   Pt reports the following symptoms: none at this time  Pt does not have an COPD Action Plan on file.   Has spirometry been completed: Yes   Followed by Dr. Courtney.    Osteoporosis: Current therapy includes: calcium once daily (she wasn't sure if she takes this more than once daily).   Refusing to have Prolia again - believes this caused her teeth to break off.    Has been recommended to stop Prolia by Oncologist.  Pt is getting the following sources of dietary calcium: milk and cheese  DEXA History: -2.3 from 9/27/16.  Scheduled again this  month.  Serum creatinine: 1.01 mg/dL 01/14/19 1336  Estimated creatinine clearance: 29.7 mL/min    Leukemia:  Having infusions every 4 months.  Reports most recent labs, WBC has drastically improved.  Followed by [facility].    Hypothyroidism: Patient is taking levothyroxine 75 mcg daily. Patient is having the following symptoms: none.   TSH   Date Value Ref Range Status   12/09/2018 7.36 (H) 0.40 - 4.00 mU/L Final     T4 Free   Date Value Ref Range Status   12/09/2018 1.15 0.76 - 1.46 ng/dL Final       Pain:  Taking Tramadol as needed for pain following hip fracture.  Does not need it often.  She did take one yesterday after being active and it was helpful.  No concerns with side effects.    Hyperlipidemia: Current therapy includes atorvastatin 20 mg once daily.  Pt reports no significant myalgias or other side effects.     Recent Labs   Lab Test 03/12/18  1009 09/12/17 10/09/14  0350   CHOL 151 145 167   HDL 47* 44 55   LDL 84 82 88   TRIG 98 95 121   CHOLHDLRATIO  --   --  3.0             Current labs include:  BP Readings from Last 3 Encounters:   01/14/19 109/59   01/07/19 125/66   12/19/18 110/70     Today's Vitals: phone visit    GFR Estimate   Date Value Ref Range Status   01/14/2019 50 (L) >60 mL/min/[1.73_m2] Final     Comment:     Non  GFR Calc  Starting 12/18/2018, serum creatinine based estimated GFR (eGFR) will be   calculated using the Chronic Kidney Disease Epidemiology Collaboration   (CKD-EPI) equation.     01/07/2019 50 (L) >60 mL/min/[1.73_m2] Final     Comment:     Non  GFR Calc  Starting 12/18/2018, serum creatinine based estimated GFR (eGFR) will be   calculated using the Chronic Kidney Disease Epidemiology Collaboration   (CKD-EPI) equation.     12/19/2018 51 (L) >60 mL/min/[1.73_m2] Final     Comment:     Non  GFR Calc  Starting 12/18/2018, serum creatinine based estimated GFR (eGFR) will be   calculated using the Chronic Kidney Disease  Epidemiology Collaboration   (CKD-EPI) equation.       CrCl cannot be calculated (Patient's most recent lab result is older than the maximum 90 days allowed.).    Most Recent Immunizations   Administered Date(s) Administered     HepA-Adult 04/08/2009     HepB-Adult 04/08/2009     Influenza (High Dose) 3 valent vaccine 09/21/2017     Influenza (IIV3) PF 09/24/2014     Influenza (intradermal) 09/24/2013     Mantoux Tuberculin Skin Test 02/22/2016     Pneumo Conj 13-V (2010&after) 09/22/2015     Pneumococcal 23 valent 09/24/2013     Tdap (Adacel,Boostrix) 07/10/2014     Tdap (Adult) Unspecified Formulation 07/10/2014       ASSESSMENT:                             Current medications were reviewed today.   Post Discharge Medication Reconciliation Status: discharge medications reconciled, continue medications without change.      Medication Adherence: no issues identified    Insomnia: stable    Anemia: stable    COPD: improved.  No symptoms at this time.  Will follow-up with Prescription Assistance.    Osteoporosis: pt refused Prolia.  Scheduled for repeat DEXA    Leukemia:  stable    Hypothyroidism: TSH above goal, will recheck labs    Pain: stable    Hyperlipidemia: due for labs      PLAN:                            1.  Recheck cholesterol labs - ordered and scheduled for April 29th  2.  Pt to follow-up with [facility] Prescription [facility] on Dulera help; if she doesn't hear back she will contact the clinic.    I spent 30minutes with this patient today. I offer these suggestions for consideration by cc'd chart to the PCP. A copy of the visit note was provided to the patient's primary care provider.    Will follow up in 2 months      Yuliana Cast , Pharm D  [phone] (phone)  [phone] (pager)  Medication Therapy Management Pharmacist

## 2019-04-18 ENCOUNTER — TRANSFERRED RECORDS (OUTPATIENT)
Dept: HEALTH INFORMATION MANAGEMENT | Facility: CLINIC | Age: 84
End: 2019-04-18

## 2019-04-23 ENCOUNTER — PATIENT OUTREACH (OUTPATIENT)
Dept: CARE COORDINATION | Facility: CLINIC | Age: 84
End: 2019-04-23

## 2019-04-23 NOTE — PROGRESS NOTES
Clinic Care Coordination Contact  [facility]/Voicemail    Clinical Data: Care Coordinator follow up Outreach  Outreach attempted x 1.  Left message on voicemail with call back information and requested return call.  Plan:  Care Coordinator will try to reach patient again in 3-5 business days.    Babs Garcia RN  Care Coordination  Phone:  [phone]  Email: [email or web address]

## 2019-05-01 ENCOUNTER — ANCILLARY PROCEDURE (OUTPATIENT)
Dept: BONE DENSITY | Facility: CLINIC | Age: 84
End: 2019-05-01
Payer: MEDICARE

## 2019-05-01 DIAGNOSIS — M81.0 SENILE OSTEOPOROSIS: ICD-10-CM

## 2019-05-01 DIAGNOSIS — I42.8 OTHER CARDIOMYOPATHY (H): ICD-10-CM

## 2019-05-01 DIAGNOSIS — E03.9 HYPOTHYROIDISM, UNSPECIFIED TYPE: ICD-10-CM

## 2019-05-01 DIAGNOSIS — Z78.0 ASYMPTOMATIC MENOPAUSAL STATE: ICD-10-CM

## 2019-05-01 DIAGNOSIS — E78.5 HYPERLIPIDEMIA WITH TARGET LDL LESS THAN 130: ICD-10-CM

## 2019-05-01 DIAGNOSIS — Z78.0 MENOPAUSE: ICD-10-CM

## 2019-05-01 PROCEDURE — 83735 ASSAY OF MAGNESIUM: CPT | Performed by: INTERNAL MEDICINE

## 2019-05-01 PROCEDURE — 84443 ASSAY THYROID STIM HORMONE: CPT | Performed by: INTERNAL MEDICINE

## 2019-05-01 PROCEDURE — 36415 COLL VENOUS BLD VENIPUNCTURE: CPT | Performed by: INTERNAL MEDICINE

## 2019-05-01 PROCEDURE — 84460 ALANINE AMINO (ALT) (SGPT): CPT | Performed by: INTERNAL MEDICINE

## 2019-05-01 PROCEDURE — 84100 ASSAY OF PHOSPHORUS: CPT | Performed by: INTERNAL MEDICINE

## 2019-05-01 PROCEDURE — 77085 DXA BONE DENSITY AXL VRT FX: CPT | Mod: 59 | Performed by: INTERNAL MEDICINE

## 2019-05-01 PROCEDURE — 80061 LIPID PANEL: CPT | Performed by: INTERNAL MEDICINE

## 2019-05-01 PROCEDURE — 77081 DXA BONE DENSITY APPENDICULR: CPT | Performed by: INTERNAL MEDICINE

## 2019-05-01 PROCEDURE — 82310 ASSAY OF CALCIUM: CPT | Performed by: INTERNAL MEDICINE

## 2019-05-01 PROCEDURE — 84450 TRANSFERASE (AST) (SGOT): CPT | Performed by: INTERNAL MEDICINE

## 2019-05-01 NOTE — TELEPHONE ENCOUNTER
"Requested Prescriptions   Pending Prescriptions Disp Refills     lisinopril (PRINIVIL/ZESTRIL) 2.5 MG tablet [Pharmacy Med Name: Lisinopril Oral Tablet 2.5 MG] 90 tablet 0     Sig: Take 1 tablet (2.5 mg) by mouth daily   Last Written Prescription Date:  12/19/2018  Last Fill Quantity: 90,  # refills: 0   Last office visit: 12/19/2018 with prescribing provider:     Future Office Visit:   Next 5 appointments (look out 90 days)    May 07, 2019 11:00 AM CDT  [facility] [address]  [facility] [address]  [phone]           ACE Inhibitors (Including Combos) Protocol Passed - 5/1/2019  3:44 PM        Passed - Blood pressure under 140/90 in past 12 months     BP Readings from Last 3 Encounters:   01/14/19 109/59   01/07/19 125/66   12/19/18 110/70                 Passed - Recent (12 mo) or future (30 days) visit within the authorizing provider's specialty     Patient had office visit in the last 12 months or has a visit in the next 30 days with authorizing provider or within the authorizing provider's specialty.  See \"Patient Info\" tab in inbasket, or \"Choose Columns\" in Meds & Orders section of the refill encounter.              Passed - Medication is active on med list        Passed - Patient is age 18 or older        Passed - No active pregnancy on record        Passed - Normal serum creatinine on file in past 12 months     Recent Labs   Lab Test 01/14/19  1336  05/16/18  1401   CR 1.01   < >  --    CREAT  --   --  1.0    < > = values in this interval not displayed.             Passed - Normal serum potassium on file in past 12 months     Recent Labs   Lab Test 01/14/19  1336   POTASSIUM 4.6             Passed - No positive pregnancy test within past 12 months        "

## 2019-05-02 ENCOUNTER — TELEPHONE (OUTPATIENT)
Dept: INTERNAL MEDICINE | Facility: CLINIC | Age: 84
End: 2019-05-02

## 2019-05-02 LAB
ALT SERPL W P-5'-P-CCNC: 14 U/L (ref 0–50)
AST SERPL W P-5'-P-CCNC: 24 U/L (ref 0–45)
CALCIUM SERPL-MCNC: 9.6 MG/DL (ref 8.5–10.1)
CHOLEST SERPL-MCNC: 161 MG/DL
HDLC SERPL-MCNC: 45 MG/DL
LDLC SERPL CALC-MCNC: 84 MG/DL
MAGNESIUM SERPL-MCNC: 2 MG/DL (ref 1.6–2.3)
NONHDLC SERPL-MCNC: 116 MG/DL
PHOSPHATE SERPL-MCNC: 3.8 MG/DL (ref 2.5–4.5)
TRIGL SERPL-MCNC: 160 MG/DL
TSH SERPL DL<=0.005 MIU/L-ACNC: 0.46 MU/L (ref 0.4–4)

## 2019-05-02 RX ORDER — LISINOPRIL 2.5 MG/1
2.5 TABLET ORAL DAILY
Qty: 90 TABLET | Refills: 1 | Status: ON HOLD | OUTPATIENT
Start: 2019-05-02 | End: 2019-05-15

## 2019-05-02 NOTE — TELEPHONE ENCOUNTER
Marie has been approved to the [employer] assistance program for Dulera & Proventil HFA through December 2019.  She will receive this medication at no cost through the enrollment period.    A 90 day supply of DULERA & PROVENTIL HFA will be delivered to Marie's home within 7-10 business days.  Marie has been informed of this approval.    She will contact my office for refills as we must work directly with the .  I will note EPIC as each refill is requested.    Thanks so much for your help!    Rupali Lewis  Prescription

## 2019-05-02 NOTE — TELEPHONE ENCOUNTER
Prescription approved per [facility] Refill Protocol. ZAID Sin R.N.

## 2019-05-07 ENCOUNTER — HOSPITAL ENCOUNTER (INPATIENT)
Facility: CLINIC | Age: 84
LOS: 8 days | Discharge: HOME | DRG: 190 | End: 2019-05-15
Attending: EMERGENCY MEDICINE | Admitting: INTERNAL MEDICINE
Payer: MEDICARE

## 2019-05-07 ENCOUNTER — OFFICE VISIT (OUTPATIENT)
Dept: INTERNAL MEDICINE | Facility: CLINIC | Age: 84
End: 2019-05-07
Payer: MEDICARE

## 2019-05-07 ENCOUNTER — APPOINTMENT (OUTPATIENT)
Dept: GENERAL RADIOLOGY | Facility: CLINIC | Age: 84
DRG: 190 | End: 2019-05-07
Attending: EMERGENCY MEDICINE
Payer: MEDICARE

## 2019-05-07 VITALS
OXYGEN SATURATION: 90 % | HEART RATE: 60 BPM | RESPIRATION RATE: 12 BRPM | WEIGHT: 93 LBS | BODY MASS INDEX: 18.75 KG/M2 | TEMPERATURE: 97.9 F | DIASTOLIC BLOOD PRESSURE: 68 MMHG | SYSTOLIC BLOOD PRESSURE: 118 MMHG | HEIGHT: 59 IN

## 2019-05-07 DIAGNOSIS — C91.10 CLL (CHRONIC LYMPHOCYTIC LEUKEMIA) (H): ICD-10-CM

## 2019-05-07 DIAGNOSIS — K59.00 CONSTIPATION, UNSPECIFIED CONSTIPATION TYPE: ICD-10-CM

## 2019-05-07 DIAGNOSIS — J44.1 COPD WITH ACUTE EXACERBATION (H): ICD-10-CM

## 2019-05-07 DIAGNOSIS — J44.1 COPD EXACERBATION (H): ICD-10-CM

## 2019-05-07 DIAGNOSIS — R09.02 HYPOXIA: ICD-10-CM

## 2019-05-07 DIAGNOSIS — M81.0 SENILE OSTEOPOROSIS: ICD-10-CM

## 2019-05-07 DIAGNOSIS — J06.9 UPPER RESPIRATORY TRACT INFECTION, UNSPECIFIED TYPE: Primary | ICD-10-CM

## 2019-05-07 DIAGNOSIS — N17.9 ACUTE KIDNEY INJURY (H): ICD-10-CM

## 2019-05-07 DIAGNOSIS — B00.1 COLD SORE: Primary | ICD-10-CM

## 2019-05-07 LAB
ANION GAP SERPL CALCULATED.3IONS-SCNC: 5 MMOL/L (ref 3–14)
ANISOCYTOSIS BLD QL SMEAR: SLIGHT
BASOPHILS # BLD AUTO: 0 10E9/L (ref 0–0.2)
BASOPHILS NFR BLD AUTO: 0 %
BUN SERPL-MCNC: 29 MG/DL (ref 7–30)
CALCIUM SERPL-MCNC: 9.4 MG/DL (ref 8.5–10.1)
CHLORIDE SERPL-SCNC: 93 MMOL/L (ref 94–109)
CO2 SERPL-SCNC: 34 MMOL/L (ref 20–32)
CREAT SERPL-MCNC: 1.43 MG/DL (ref 0.52–1.04)
DIFFERENTIAL METHOD BLD: ABNORMAL
ELLIPTOCYTES BLD QL SMEAR: SLIGHT
EOSINOPHIL # BLD AUTO: 0 10E9/L (ref 0–0.7)
EOSINOPHIL NFR BLD AUTO: 0 %
ERYTHROCYTE [DISTWIDTH] IN BLOOD BY AUTOMATED COUNT: 16.8 % (ref 10–15)
FLUAV+FLUBV AG SPEC QL: NEGATIVE
FLUAV+FLUBV AG SPEC QL: NEGATIVE
GFR SERPL CREATININE-BSD FRML MDRD: 33 ML/MIN/{1.73_M2}
GLUCOSE SERPL-MCNC: 122 MG/DL (ref 70–99)
HCT VFR BLD AUTO: 41.8 % (ref 35–47)
HGB BLD-MCNC: 12.6 G/DL (ref 11.7–15.7)
LYMPHOCYTES # BLD AUTO: 119 10E9/L (ref 0.8–5.3)
LYMPHOCYTES NFR BLD AUTO: 96 %
MCH RBC QN AUTO: 28 PG (ref 26.5–33)
MCHC RBC AUTO-ENTMCNC: 30.1 G/DL (ref 31.5–36.5)
MCV RBC AUTO: 93 FL (ref 78–100)
MICROCYTES BLD QL SMEAR: PRESENT
MONOCYTES # BLD AUTO: 1.2 10E9/L (ref 0–1.3)
MONOCYTES NFR BLD AUTO: 1 %
NEUTROPHILS # BLD AUTO: 3.7 10E9/L (ref 1.6–8.3)
NEUTROPHILS NFR BLD AUTO: 3 %
NT-PROBNP SERPL-MCNC: 423 PG/ML (ref 0–1800)
OVALOCYTES BLD QL SMEAR: SLIGHT
PLATELET # BLD AUTO: 109 10E9/L (ref 150–450)
POTASSIUM SERPL-SCNC: 3.7 MMOL/L (ref 3.4–5.3)
RBC # BLD AUTO: 4.5 10E12/L (ref 3.8–5.2)
RBC INCLUSIONS BLD: SLIGHT
SODIUM SERPL-SCNC: 132 MMOL/L (ref 133–144)
SPECIMEN SOURCE: NORMAL
TROPONIN I SERPL-MCNC: <0.015 UG/L (ref 0–0.04)
WBC # BLD AUTO: 124 10E9/L (ref 4–11)

## 2019-05-07 PROCEDURE — 84484 ASSAY OF TROPONIN QUANT: CPT | Performed by: EMERGENCY MEDICINE

## 2019-05-07 PROCEDURE — 99285 EMERGENCY DEPT VISIT HI MDM: CPT | Mod: 25

## 2019-05-07 PROCEDURE — 94640 AIRWAY INHALATION TREATMENT: CPT

## 2019-05-07 PROCEDURE — A9270 NON-COVERED ITEM OR SERVICE: HCPCS | Performed by: INTERNAL MEDICINE

## 2019-05-07 PROCEDURE — 25000128 H RX IP 250 OP 636: Performed by: EMERGENCY MEDICINE

## 2019-05-07 PROCEDURE — 71046 X-RAY EXAM CHEST 2 VIEWS: CPT

## 2019-05-07 PROCEDURE — 80048 BASIC METABOLIC PNL TOTAL CA: CPT | Performed by: EMERGENCY MEDICINE

## 2019-05-07 PROCEDURE — 25000125 ZZHC RX 250: Performed by: EMERGENCY MEDICINE

## 2019-05-07 PROCEDURE — 87804 INFLUENZA ASSAY W/OPTIC: CPT | Performed by: EMERGENCY MEDICINE

## 2019-05-07 PROCEDURE — 25800030 ZZH RX IP 258 OP 636: Performed by: INTERNAL MEDICINE

## 2019-05-07 PROCEDURE — 94640 AIRWAY INHALATION TREATMENT: CPT | Mod: 76

## 2019-05-07 PROCEDURE — 40000274 ZZH STATISTIC RCP CONSULT EA 30 MIN

## 2019-05-07 PROCEDURE — 99214 OFFICE O/P EST MOD 30 MIN: CPT | Performed by: INTERNAL MEDICINE

## 2019-05-07 PROCEDURE — 40000275 ZZH STATISTIC RCP TIME EA 10 MIN

## 2019-05-07 PROCEDURE — 93005 ELECTROCARDIOGRAM TRACING: CPT

## 2019-05-07 PROCEDURE — 85025 COMPLETE CBC W/AUTO DIFF WBC: CPT | Performed by: EMERGENCY MEDICINE

## 2019-05-07 PROCEDURE — 25000128 H RX IP 250 OP 636: Performed by: INTERNAL MEDICINE

## 2019-05-07 PROCEDURE — 96376 TX/PRO/DX INJ SAME DRUG ADON: CPT

## 2019-05-07 PROCEDURE — 36415 COLL VENOUS BLD VENIPUNCTURE: CPT | Performed by: EMERGENCY MEDICINE

## 2019-05-07 PROCEDURE — 25000132 ZZH RX MED GY IP 250 OP 250 PS 637: Performed by: INTERNAL MEDICINE

## 2019-05-07 PROCEDURE — 83880 ASSAY OF NATRIURETIC PEPTIDE: CPT | Performed by: EMERGENCY MEDICINE

## 2019-05-07 PROCEDURE — 96374 THER/PROPH/DIAG INJ IV PUSH: CPT

## 2019-05-07 PROCEDURE — 87040 BLOOD CULTURE FOR BACTERIA: CPT | Performed by: EMERGENCY MEDICINE

## 2019-05-07 PROCEDURE — 99223 1ST HOSP IP/OBS HIGH 75: CPT | Mod: AI | Performed by: INTERNAL MEDICINE

## 2019-05-07 PROCEDURE — 12000000 ZZH R&B MED SURG/OB

## 2019-05-07 PROCEDURE — 25000125 ZZHC RX 250: Performed by: INTERNAL MEDICINE

## 2019-05-07 RX ORDER — NALOXONE HYDROCHLORIDE 0.4 MG/ML
.1-.4 INJECTION, SOLUTION INTRAMUSCULAR; INTRAVENOUS; SUBCUTANEOUS
Status: DISCONTINUED | OUTPATIENT
Start: 2019-05-07 | End: 2019-05-15 | Stop reason: HOSPADM

## 2019-05-07 RX ORDER — TRAZODONE HYDROCHLORIDE 100 MG/1
100 TABLET ORAL
Status: DISCONTINUED | OUTPATIENT
Start: 2019-05-07 | End: 2019-05-15 | Stop reason: HOSPADM

## 2019-05-07 RX ORDER — METHYLPREDNISOLONE SODIUM SUCCINATE 125 MG/2ML
125 INJECTION, POWDER, LYOPHILIZED, FOR SOLUTION INTRAMUSCULAR; INTRAVENOUS ONCE
Status: COMPLETED | OUTPATIENT
Start: 2019-05-07 | End: 2019-05-07

## 2019-05-07 RX ORDER — LEVOTHYROXINE SODIUM 75 UG/1
75 TABLET ORAL DAILY
Status: DISCONTINUED | OUTPATIENT
Start: 2019-05-08 | End: 2019-05-15 | Stop reason: HOSPADM

## 2019-05-07 RX ORDER — SODIUM CHLORIDE 9 MG/ML
INJECTION, SOLUTION INTRAVENOUS CONTINUOUS
Status: ACTIVE | OUTPATIENT
Start: 2019-05-07 | End: 2019-05-08

## 2019-05-07 RX ORDER — GABAPENTIN 300 MG/1
300 CAPSULE ORAL AT BEDTIME
Status: DISCONTINUED | OUTPATIENT
Start: 2019-05-07 | End: 2019-05-15 | Stop reason: HOSPADM

## 2019-05-07 RX ORDER — ATORVASTATIN CALCIUM 20 MG/1
20 TABLET, FILM COATED ORAL DAILY
Status: DISCONTINUED | OUTPATIENT
Start: 2019-05-08 | End: 2019-05-15 | Stop reason: HOSPADM

## 2019-05-07 RX ORDER — IPRATROPIUM BROMIDE AND ALBUTEROL SULFATE 2.5; .5 MG/3ML; MG/3ML
3 SOLUTION RESPIRATORY (INHALATION)
Status: COMPLETED | OUTPATIENT
Start: 2019-05-07 | End: 2019-05-07

## 2019-05-07 RX ORDER — ONDANSETRON 2 MG/ML
4 INJECTION INTRAMUSCULAR; INTRAVENOUS EVERY 6 HOURS PRN
Status: DISCONTINUED | OUTPATIENT
Start: 2019-05-07 | End: 2019-05-15 | Stop reason: HOSPADM

## 2019-05-07 RX ORDER — METHYLPREDNISOLONE SODIUM SUCCINATE 40 MG/ML
40 INJECTION, POWDER, LYOPHILIZED, FOR SOLUTION INTRAMUSCULAR; INTRAVENOUS EVERY 12 HOURS
Status: DISCONTINUED | OUTPATIENT
Start: 2019-05-07 | End: 2019-05-09

## 2019-05-07 RX ORDER — TRAMADOL HYDROCHLORIDE 50 MG/1
50 TABLET ORAL EVERY 6 HOURS PRN
Status: DISCONTINUED | OUTPATIENT
Start: 2019-05-07 | End: 2019-05-15 | Stop reason: HOSPADM

## 2019-05-07 RX ORDER — ALBUTEROL SULFATE 0.83 MG/ML
2.5 SOLUTION RESPIRATORY (INHALATION)
Status: DISCONTINUED | OUTPATIENT
Start: 2019-05-07 | End: 2019-05-15 | Stop reason: HOSPADM

## 2019-05-07 RX ORDER — SERTRALINE HYDROCHLORIDE 25 MG/1
25 TABLET, FILM COATED ORAL DAILY
Status: DISCONTINUED | OUTPATIENT
Start: 2019-05-08 | End: 2019-05-15 | Stop reason: HOSPADM

## 2019-05-07 RX ORDER — AZITHROMYCIN 250 MG/1
250 TABLET, FILM COATED ORAL DAILY
Status: COMPLETED | OUTPATIENT
Start: 2019-05-08 | End: 2019-05-11

## 2019-05-07 RX ORDER — AZITHROMYCIN 250 MG/1
500 TABLET, FILM COATED ORAL ONCE
Status: COMPLETED | OUTPATIENT
Start: 2019-05-07 | End: 2019-05-07

## 2019-05-07 RX ORDER — ACETAMINOPHEN 325 MG/1
650 TABLET ORAL EVERY 4 HOURS PRN
Status: DISCONTINUED | OUTPATIENT
Start: 2019-05-07 | End: 2019-05-15 | Stop reason: HOSPADM

## 2019-05-07 RX ORDER — METOPROLOL SUCCINATE 25 MG/1
25 TABLET, EXTENDED RELEASE ORAL DAILY
Status: DISCONTINUED | OUTPATIENT
Start: 2019-05-08 | End: 2019-05-15 | Stop reason: HOSPADM

## 2019-05-07 RX ORDER — AMOXICILLIN 250 MG
2 CAPSULE ORAL 2 TIMES DAILY PRN
Status: DISCONTINUED | OUTPATIENT
Start: 2019-05-07 | End: 2019-05-15 | Stop reason: HOSPADM

## 2019-05-07 RX ORDER — FERROUS SULFATE 325(65) MG
325 TABLET ORAL DAILY
Status: DISCONTINUED | OUTPATIENT
Start: 2019-05-08 | End: 2019-05-15 | Stop reason: HOSPADM

## 2019-05-07 RX ORDER — IPRATROPIUM BROMIDE AND ALBUTEROL SULFATE 2.5; .5 MG/3ML; MG/3ML
3 SOLUTION RESPIRATORY (INHALATION)
Status: DISCONTINUED | OUTPATIENT
Start: 2019-05-07 | End: 2019-05-15

## 2019-05-07 RX ORDER — POLYETHYLENE GLYCOL 3350 17 G/17G
17 POWDER, FOR SOLUTION ORAL DAILY PRN
Status: DISCONTINUED | OUTPATIENT
Start: 2019-05-07 | End: 2019-05-15 | Stop reason: HOSPADM

## 2019-05-07 RX ORDER — GABAPENTIN 300 MG/1
300 CAPSULE ORAL AT BEDTIME
Status: ON HOLD | COMMUNITY
End: 2019-06-19

## 2019-05-07 RX ORDER — AMOXICILLIN 250 MG
1 CAPSULE ORAL 2 TIMES DAILY PRN
Status: DISCONTINUED | OUTPATIENT
Start: 2019-05-07 | End: 2019-05-15 | Stop reason: HOSPADM

## 2019-05-07 RX ORDER — ONDANSETRON 4 MG/1
4 TABLET, ORALLY DISINTEGRATING ORAL EVERY 6 HOURS PRN
Status: DISCONTINUED | OUTPATIENT
Start: 2019-05-07 | End: 2019-05-15 | Stop reason: HOSPADM

## 2019-05-07 RX ADMIN — AZITHROMYCIN 500 MG: 250 TABLET, FILM COATED ORAL at 18:09

## 2019-05-07 RX ADMIN — ENOXAPARIN SODIUM 30 MG: 30 INJECTION SUBCUTANEOUS at 20:05

## 2019-05-07 RX ADMIN — GABAPENTIN 300 MG: 300 CAPSULE ORAL at 22:37

## 2019-05-07 RX ADMIN — SODIUM CHLORIDE: 9 INJECTION, SOLUTION INTRAVENOUS at 16:52

## 2019-05-07 RX ADMIN — IPRATROPIUM BROMIDE AND ALBUTEROL SULFATE 3 ML: .5; 3 SOLUTION RESPIRATORY (INHALATION) at 19:16

## 2019-05-07 RX ADMIN — IPRATROPIUM BROMIDE AND ALBUTEROL SULFATE 3 ML: .5; 3 SOLUTION RESPIRATORY (INHALATION) at 15:00

## 2019-05-07 RX ADMIN — METHYLPREDNISOLONE SODIUM SUCCINATE 40 MG: 40 INJECTION, POWDER, FOR SOLUTION INTRAMUSCULAR; INTRAVENOUS at 20:05

## 2019-05-07 RX ADMIN — ASPIRIN 325 MG: 325 TABLET, DELAYED RELEASE ORAL at 20:05

## 2019-05-07 RX ADMIN — IPRATROPIUM BROMIDE AND ALBUTEROL SULFATE 3 ML: .5; 3 SOLUTION RESPIRATORY (INHALATION) at 15:27

## 2019-05-07 RX ADMIN — Medication 1 MG: at 22:38

## 2019-05-07 RX ADMIN — METHYLPREDNISOLONE SODIUM SUCCINATE 125 MG: 125 INJECTION, POWDER, FOR SOLUTION INTRAMUSCULAR; INTRAVENOUS at 14:18

## 2019-05-07 RX ADMIN — SODIUM CHLORIDE 1000 ML: 9 INJECTION, SOLUTION INTRAVENOUS at 13:29

## 2019-05-07 RX ADMIN — TRAZODONE HYDROCHLORIDE 100 MG: 100 TABLET ORAL at 22:38

## 2019-05-07 RX ADMIN — IPRATROPIUM BROMIDE AND ALBUTEROL SULFATE 3 ML: .5; 3 SOLUTION RESPIRATORY (INHALATION) at 14:39

## 2019-05-07 ASSESSMENT — ENCOUNTER SYMPTOMS
WEAKNESS: 1
SHORTNESS OF BREATH: 0
ABDOMINAL PAIN: 0
COUGH: 1
FEVER: 1
NAUSEA: 0
CHILLS: 1
VOMITING: 0

## 2019-05-07 ASSESSMENT — ACTIVITIES OF DAILY LIVING (ADL): ADLS_ACUITY_SCORE: 15

## 2019-05-07 ASSESSMENT — MIFFLIN-ST. JEOR
SCORE: 762.48
SCORE: 776.08

## 2019-05-07 NOTE — PROGRESS NOTES
"[facility]     Date: 05/07/2019  Admission Dx:COPD Ex  Pulmonary History: COPD, CHF  Home Nebulizer/MDI Use: Albuterol MDI Q6 prn, Dulera BID  Home Oxygen: None  Acuity Level (RCAT flow sheet): 3  Aerosol Therapy initiated: Duoneb QID, Albuterol Q2 prn, Dulera BID  Pulmonary Hygiene initiated: Cough and deep breathing techniques  Volume Expansion initiated: IS TID  Current Oxygen Requirements: 3 LPM NC  Current SpO2:91%  Re-evaluation date: 05/10/2019  Patient Education: Education was performed with the patient in regards to indications/benefits and possible side effects of bronchodilators. Will continue to do education with patient.       See \"RT Assessments\" flow sheet for patient assessment scoring and Acuity Level Details.     Vital signs:  Temp: 99.8  F (37.7  C) Temp src: Oral BP: (!) 142/118 Pulse: 82 Heart Rate: 89 Resp: 16 SpO2: 91 % O2 Device: Nasal cannula Oxygen Delivery: 3 LPM Height: 149.9 cm (4' 11.02\") Weight: 43.5 kg (96 lb)  Estimated body mass index is 19.38 kg/m  as calculated from the following:    Height as of this encounter: 1.499 m (4' 11.02\").    Weight as of this encounter: 43.5 kg (96 lb).    Past Medical History:   Diagnosis Date     Arthritis     Generalized     Bladder cancer (H)      Bladder cancer (H)      Cardiomyopathy (H)      CLL (chronic lymphocytic leukemia) (H)      Congestive heart failure (H) 10/24/2014    flash pulm edema ass w/Takotsubo     COPD (chronic obstructive pulmonary disease) (H)     noc O2     Hypertension      Hypothyroid      Mumps      Myocardial infarction (H) 10/2014    Takotsubo presentation w/mild CAD     Pulmonary edema cardiac cause (H) 10/08/2014    associated w/Takotsubo ACS     Tricuspid valve disorders, 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    TR 2-3+ per echo       Past Surgical History:   Procedure Laterality Date     BIOPSY LYMPH NODE INGUINAL Right 10/23/2018    Procedure: excsional biopsy right inguinal lymph node;  Surgeon: Reji Connors MD; "  Location: [facility] OR     BLADDER SURGERY       CORONARY ANGIOGRAPHY ADULT ORDER  10/2014    minimal CAD     CV LEFT HEART CATH N/A 2018    Procedure: Left Heart Cath;  Surgeon: Sadiq Carrasco MD;  Location: [facility] HEART CARDIAC CATH LAB     CYSTOSCOPY       CYSTOSCOPY, BIOPSY BLADDER, COMBINED N/A 2016    Procedure: COMBINED CYSTOSCOPY, BIOPSY BLADDER;  Surgeon: Kar Nuñez MD;  Location: [facility] OR     CYSTOSCOPY, TRANSURETHRAL RESECTION (TUR) TUMOR BLADDER, COMBINED N/A 2016    Procedure: COMBINED CYSTOSCOPY, TRANSURETHRAL RESECTION (TUR) TUMOR BLADDER;  Surgeon: Kar Nuñez MD;  Location: [facility] OR     ESOPHAGOSCOPY, GASTROSCOPY, DUODENOSCOPY (EGD), COMBINED N/A 2016    Procedure: COMBINED ESOPHAGOSCOPY, GASTROSCOPY, DUODENOSCOPY (EGD);  Surgeon: Freddie Diez MD;  Location: [facility] GI     OPEN REDUCTION INTERNAL FIXATION HIP BIPOLAR Left 2018    Procedure: Left bipolar hemiarthroplasty;  Surgeon: Scar Reynolds MD;  Location: [facility] OR       Family History   Problem Relation Age of Onset     Cerebrovascular Disease Mother      Cancer Father        Social History     Tobacco Use     Smoking status: Former Smoker     Types: Cigarettes     Last attempt to quit: 2/3/1996     Years since quittin.2     Smokeless tobacco: Never Used   Substance Use Topics     Alcohol use: No     Alcohol/week: 0.0 oz     Study Result     XR CHEST 2019 2:14 PM     HISTORY: cough                                                                      IMPRESSION: Emphysematous changes. Possible nodular changes in the  lung apices bilaterally are prominent than previous exams. Consider CT  for further evaluation. Prominent thoracal lumbar scoliosis.     ROSALIA GUAN MD     Prior to Admission medications    Medication Sig Last Dose Taking? Auth Provider   albuterol (PROAIR HFA/PROVENTIL HFA/VENTOLIN HFA) 108 (90 BASE) MCG/ACT Inhaler Inhale 2 puffs into the lungs every 6 hours as needed for  wheezing  at prn Yes Piper Kiser MD   albuterol (PROVENTIL) (2.5 MG/3ML) 0.083% neb solution Take 2.5 mg by nebulization every 6 hours as needed for shortness of breath / dyspnea or wheezing 5/7/2019 at Unknown time Yes Reported, Patient   aspirin 325 MG EC tablet Take 1 tablet (325 mg) by mouth daily 5/6/2019 at pm Yes Jocelin Streeter PA-C   atorvastatin (LIPITOR) 20 MG tablet TAKE ONE TABLET BY MOUTH ONE TIME DAILY  5/7/2019 at am Yes Piper Kiser MD   calcium carbonate-vitamin D (OS-CARLOS) 500-400 MG-UNIT tablet Take 1 tablet by mouth daily 5/7/2019 at am Yes Reported, Patient   ferrous sulfate (IRON) 325 (65 FE) MG tablet Take 325 mg by mouth daily (with breakfast)  5/7/2019 at am Yes Reported, Patient   furosemide (LASIX) 20 MG tablet Take 1 tablet (20 mg) by mouth daily 5/7/2019 at am Yes Piper Kiser MD   gabapentin (NEURONTIN) 300 MG capsule Take 300 mg by mouth At Bedtime 5/6/2019 at pm Yes Unknown, Entered By History   levothyroxine (SYNTHROID/LEVOTHROID) 75 MCG tablet TAKE ONE TABLET BY MOUTH ONE TIME DAILY  5/7/2019 at am Yes Piper Kiser MD   lisinopril (PRINIVIL/ZESTRIL) 2.5 MG tablet Take 1 tablet (2.5 mg) by mouth daily 5/7/2019 at am Yes Piper Kiser MD   metoprolol succinate (TOPROL-XL) 25 MG 24 hr tablet Take 1 tablet (25 mg) by mouth daily 5/7/2019 at am Yes Piper Kiser MD   Multiple Vitamins-Minerals (MULTIVITAMIN GUMMIES ADULT PO) Take 2 tablets by mouth daily  5/7/2019 at am Yes Reported, Patient   ondansetron (ZOFRAN-ODT) 4 MG ODT tab Take 1 tablet (4 mg) by mouth every 6 hours as needed for nausea or vomiting  at prn Yes Jocelin Streeter PA-C   sertraline (ZOLOFT) 25 MG tablet Take 1 tablet (25 mg) by mouth daily 5/7/2019 at am Yes Piper Kiser MD   TRAMADOL HCL PO Take 50 mg by mouth every 6 hours as needed for moderate to severe pain   at prn Yes Reported, Patient   traZODone (DESYREL) 50 MG tablet TAKE 2 TABLETS BY MOUTH  NIGHTLY AS NEEDED FOR SLEEP 5/6/2019 at pm Yes Piper Kiser MD   UNABLE TO FIND Inhale 1 puff into the lungs 2 times daily MEDICATION NAME: Red inhaler, most likely Symbicort 80-4.5 5/7/2019 at am Yes Unknown, Entered By History   mometasone-formoterol (DULERA) 200-5 MCG/ACT inhaler Inhale 2 puffs into the lungs 2 times daily  Patient not taking: Reported on 4/16/2019  at not started yet   Piper Kiser MD   order for DME Equipment being ordered: portable oxygen;   Ambulatory O2 saturation of 87%  Patient not taking: Reported on 4/16/2019     Piper Kiser MD   order for DME Equipment being ordered: Nebulizer  Fax order to [facility] [phone]     Piper Kiser MD Michael Rentz   5/7/2019

## 2019-05-07 NOTE — ED TRIAGE NOTES
ABC's intact.  Alert and oriented x4.    Pt presents to ED from clinic due to oxygen levels in clinic.  Pt c/o shakes, some confusion but remains oriented.  Oxygen applied in triage.  Pt denies shortness of breath, but states she is feeling more weak than usual.

## 2019-05-07 NOTE — ED PROVIDER NOTES
History     Chief Complaint:  Generalized Weakness     HPI   Marie Kline is a 87 year old female with a history of COPD, CHF, chronic lymphocytic leukemia, and hypertension who presents for evaluation of generalized weakness. On 5/4/2019, the patient started to develop generalized weakness and shaking chills. She has had an intermittent productive cough and has been using her nebulizers at home. This morning, the patient went into her clinic and was found to be hypoxic on room air with a fever. Due to this, the patient was advised to seek evaluation in the ED. She denies feelings of shortness of breath or any recent chest pain, abdominal pain, nausea, or vomiting.     Allergies:  Contrast dye   Prolia      Medications:    albuterol (PROAIR HFA/PROVENTIL HFA/VENTOLIN HFA) 108 (90 BASE) MCG/ACT Inhaler  albuterol (PROVENTIL) (2.5 MG/3ML) 0.083% neb solution  aspirin 325 MG EC tablet  atorvastatin (LIPITOR) 20 MG tablet  calcium carbonate-vitamin D (OS-CARLOS) 500-400 MG-UNIT tablet  ferrous sulfate (IRON) 325 (65 FE) MG tablet  furosemide (LASIX) 20 MG tablet  gabapentin (NEURONTIN) 300 MG capsule  levothyroxine (SYNTHROID/LEVOTHROID) 75 MCG tablet  lisinopril (PRINIVIL/ZESTRIL) 2.5 MG tablet  metoprolol succinate (TOPROL-XL) 25 MG 24 hr tablet  mometasone-formoterol (DULERA) 200-5 MCG/ACT inhaler  Multiple Vitamins-Minerals (MULTIVITAMIN GUMMIES ADULT PO)  ondansetron (ZOFRAN-ODT) 4 MG ODT tab  sertraline (ZOLOFT) 25 MG tablet  TRAMADOL HCL PO  traZODone (DESYREL) 50 MG tablet     Past Medical History:    Arthritis  Bladder cancer  Cardiomyopathy   Chronic lymphocytic leukemia  Congestive heart failure   COPD   Hypertension  Hypothyroid   Mumps  Myocardial infraction  Pulmonary edema, cardiac cause  Tricuspid valve disorders   Chronic kidney disease, stage 3   Lumbar degenerative disc disease   Senile osteoporosis     Past Surgical History:    Biopsy lymph node inguinal, right  Bladder surgery  Coronary angiography  adult order  CV left heart cath  Cystoscopy  Cystoscopy, biopsy bladder, combined   Cystoscopy, transurethral resection tumor bladder, combined   EGD, combined   ORIF, left hip, bipolar     Family History:    Cerebrovascular disease - Mother  Cancer - Father     Social History:  Tobacco use:    Former smoker, quit 1996   Alcohol use:    Negative   Marital status:       Accompanied to ED by:  Alone     Review of Systems   Constitutional: Positive for chills and fever.   Respiratory: Positive for cough. Negative for shortness of breath.    Cardiovascular: Negative for chest pain.   Gastrointestinal: Negative for abdominal pain, nausea and vomiting.   Neurological: Positive for weakness.   All other systems reviewed and are negative.      Physical Exam   First Vitals:  BP: 125/65  Pulse: 83  Heart Rate: 79  Temp: 100.9  F (38.3  C)  Resp: 24  SpO2: (!) 82 %      Physical Exam  Constitutional: Alert, attentive, chronically ill appearing  HENT:    Nose: Nose normal.    Mouth/Throat: Oropharynx is clear, mucous membranes are moist   Eyes: EOM are normal.   CV: regular rate and rhythm; no murmurs, rubs or gallups  Chest: Effort normal, breath sounds diminished bilaterally  GI:  There is no tenderness. No distension. Normal bowel sounds  MSK: Normal range of motion. No LE edema  Neurological: Alert, attentive  Skin: Skin is warm and dry.      Emergency Department Course   ECG (13:37:31):  Indication: Screening for cardiovascular disease.   Rate 81 bpm. TX interval * ms. QRS duration 86 ms. QT/QTc 366/425 ms. P-R-T axes * 41 79.   Interpretation: Undetermined rhythm, Nonspecific ST and T wave abnormality, Abnormal ECG   Agree with computer interpretation. Yes   No significant change compared to EKG dated 1/14/19.   Interpreted at 1340 by Dr. Montoya.      Imaging:  Radiographic findings were communicated with the patient who voiced understanding of the findings.    XR Chest:  IMPRESSION: Emphysematous changes.  Possible nodular changes in the lung apices bilaterally are prominent than previous exams. Consider CT for further evaluation. Prominent thoracal lumbar scoliosis.  Per radiology.     Laboratory:  CBC: .0 high, o/w WNL (HGB 12.6)   BMP:  low, Chloride 93 low, Carbon dioxide 34 high, Glucose 122 high, Creatinine 1.43 high, GFR estimate 33 low, o/w WNL   Troponin I 1324: <0.015   BNP: 423   Blood culture: Pending x2   Influenza A/B antigen: A negative, B negative     Interventions:  1329 NS 1,000 mL IV   1418 Solu-medrol 125 mg IV   1439 Duoneb 3 mL Nebulization     Emergency Department Course:  Nursing notes and vitals reviewed.  1304: I performed an exam of the patient as documented above.     1444: I spoke with Dr. Lopez of the hospitalist service regarding patient's presentation, findings, and plan of care.     Findings and plan explained to the Patient who consents to admission. Discussed the patient with Dr. Lopez, who will admit the patient to a [facility] bed for further monitoring, evaluation, and treatment.     Impression & Plan      Medical Decision Making:  This is an 87-year-old female with history of COPD, CHF, CLL, and hypertension who presents for evaluation of weakness.  Differential includes possible COPD exacerbation given her new onset hypoxia, pneumonia, metabolic and other processes.  Globally presentation and work-up is suggestive of possible URI symptoms leading to COPD exacerbation hypoxia, with component of likely prerenal ISELA.  Her CLL continues to be associated with a significant leukocytosis.  No other acute findings are identified at this time.  We will trial nebs steroids and attempt to liberate her from oxygen support.  She is breathing comfortably without any respiratory distress.  She will be placed inpatient medical surgical floor with plan for continued supportive cares diagnostics.  She is safe for admission at this time.    Diagnosis:    ICD-10-CM   1. COPD exacerbation  (H) J44.1   2. Hypoxia R09.02   3. Acute kidney injury (H) N17.9   4. CLL (chronic lymphocytic leukemia) (H) C91.90       Disposition:  Admitted to Dr. Lopez.       I, Rk Godinez, am serving as a scribe at 1:04 PM on 5/7/2019 to document services personally performed by Dr. Montoya, based on my observations and the provider's statements to me.    [facility] EMERGENCY DEPARTMENT       Wenceslao Montoya MD  05/07/19 9024

## 2019-05-07 NOTE — PROGRESS NOTES
"  SUBJECTIVE:   Marie Kline is a 87 year old female who presents to clinic today for the following   health issues:      Rigors. Patient has had the \"shakes\" since Saturday.  She has had a cough for 1 week or more. She denies any fevers.   The patient reports that she has had a productive phlegm of a yellow and green color.    She does have some shortness of breath.  Denies any leg swelling.    She does not want to go to the hospital.    Weight loss.  She has lost approx 20 lbs after having losing her upper teeth  in February.  She had been on prolia for her osteoporosis. She is having ensure drinks or boost.   She needs to get the dentures fit again on the 14th.  The patient does not want any additional medications for osteoporosis at this time.     Thyroid was checked 5/1/2019.  This was within normal limits.       Heart Failure Follow-up    Symptoms:    Shortness of breath: none    Lower extremity edema: stable, sometimes pt noted, but she elevates her legs when this occurs     Chest pain: No    Using more pillows than normal: No    Cough at night: No    Weight:    Checking weight daily: Yes    Weight change: pt noting \"just going down\"    Cardiology visits, ER/UC, or hospital admissions since last visit: ER/UC - 1/7/19 & 1/14/19    Medication side effects: none      Amount of exercise or physical activity: leg exercises, walks    Problems taking medications regularly: No    Medication side effects: none    Diet: regular (no restrictions)          Reviewed  and updated as needed this visit by clinical staff  Tobacco  Allergies  Meds  Med Hx  Surg Hx  Fam Hx  Soc Hx        Reviewed and updated as needed this visit by Provider           ROS:  CONSTITUTIONAL: NEGATIVE for fever, chills, change in weight  ENT/MOUTH: POS dentures  RESP: NEGATIVE for significant cough or SOB  CV: NEGATIVE for chest pain, palpitations or peripheral edema    OBJECTIVE:     /68   Pulse 60   Temp 97.9  F (36.6  C) (Oral)   " "Resp 12   Ht 1.499 m (4' 11\")   Wt 42.2 kg (93 lb)   SpO2 90%   BMI 18.78 kg/m    Body mass index is 18.78 kg/m .  GENERAL: cachectic appearing with rigors  RESP: lungs clear to auscultation - no rales, rhonchi or wheezes decreased breath sounds throughout  CV: regular rate and rhythm, normal S1 S2, no S3 or S4, no murmur, click or rub  Neuro: CN II-XII intact; moving all extremities  Psych: mental status slower than her usual self      ASSESSMENT/PLAN:     (J06.9) Upper respiratory tract infection, unspecified type  (primary encounter diagnosis)  Comment:   Plan: patient to go to ER given rigors and hypoxia    (R09.02) Hypoxia  Comment: likely secondary to above  Plan: recommend patient go to ER since oxygen sat <90%    (J44.1) COPD exacerbation (H)  Comment:   Plan: on her inhalers    (M81.0) Senile osteoporosis  Comment:   Plan: patient declines additional medication at this time          Piper Kiser MD  [facility]      Will send patient to ER as she has rigors, fatigue, cough, oxygen of 88%, and mental fogginess      "

## 2019-05-07 NOTE — LETTER
Key Recommendations:  Patient admitted with COPD exacebation and suspect viral URI hypoxemic respiratory failure. Patient being treated with oxygen, IV steriods, nebs and azithromax. CTS following for COPD and elevated CARLOS MANUEL. Patient lives independently at [facility]. She receives meal services, 20 meals/month. Patient managing her own medications. She gets her prescriptions mailed from [facility]. She reports no problems taking or getting her medications. Patient uses a cane as needed. She has portable oxygen and nebulizer equipment at home. She uses them independently as needed. Patient drives herself to appointments. Her neighbor and family are supportive. Per patient request, follow up appointment requested with Dr. Kiser.  Patient doesn't anticipate any discharge needs.      Follow up appointment scheduled with Dr. Kiser at [facility], Monday, May 20th at 11:00am.         Vaishnavi Garcias    AVS/Discharge Summary is the source of truth; this is a helpful guide for improved communication of patient story

## 2019-05-07 NOTE — PHARMACY-ADMISSION MEDICATION HISTORY
Admission medication history interview status for this patient is complete. See [facility] admission navigator for allergy information, prior to admission medications and immunization status.     Medication history interview source(s):Patient  Medication history resources (including written lists, pill bottles, clinic record):Printed list from clinic  Primary pharmacy:[facility] in [place]    Changes made to PTA medication list:  Added: unknown inhaler (red inhaler, possibly symbicort 80-4.5,  to use before getting Dulera  Deleted: none  Changed: gabapentin from BID to at bedtime.    Actions taken by pharmacist (provider contacted, etc):Called pharmacy to clarify inhaler. Symbicort has not been filled but there was an active but old fill of Breo ellipta 200-25 one puff daily. No other recent inhalers     Additional medication history information:Patient states that she is using a red inhaler one puff two times daily until she can get the Dulera 200-5. On her list of old inhalers there is Symbicort 80-4.5 (red) two puffs BID, Breo Ellipta 200-25 (Blue cap, pink label) one puff daily, Advair Diskus 100-50 (purple). She is unclear to which inhaler it is. [facility] has filled the Breo Ellipta but not recently. Symbicort is on record but has not been filled due to insurance issues.     Medication reconciliation/reorder completed by provider prior to medication history? Yes    Do you take OTC medications (eg tylenol, ibuprofen, fish oil, eye/ear drops, etc)? Y(Y/N)    For patients on insulin therapy: N (Y/N)    Time spent in this activity: 20 min    Prior to Admission medications    Medication Sig Last Dose Taking? Auth Provider   albuterol (PROAIR HFA/PROVENTIL HFA/VENTOLIN HFA) 108 (90 BASE) MCG/ACT Inhaler Inhale 2 puffs into the lungs every 6 hours as needed for wheezing  at prn Yes Piper Kiser MD   albuterol (PROVENTIL) (2.5 MG/3ML) 0.083% neb solution Take 2.5 mg by nebulization every 6 hours as needed for shortness of breath /  dyspnea or wheezing 5/7/2019 at Unknown time Yes Reported, Patient   aspirin 325 MG EC tablet Take 1 tablet (325 mg) by mouth daily 5/6/2019 at pm Yes Jocelin Streeter PA-C   atorvastatin (LIPITOR) 20 MG tablet TAKE ONE TABLET BY MOUTH ONE TIME DAILY  5/7/2019 at am Yes Piper Kiser MD   calcium carbonate-vitamin D (OS-CARLOS) 500-400 MG-UNIT tablet Take 1 tablet by mouth daily 5/7/2019 at am Yes Reported, Patient   ferrous sulfate (IRON) 325 (65 FE) MG tablet Take 325 mg by mouth daily (with breakfast)  5/7/2019 at am Yes Reported, Patient   furosemide (LASIX) 20 MG tablet Take 1 tablet (20 mg) by mouth daily 5/7/2019 at am Yes Piper Kiser MD   gabapentin (NEURONTIN) 300 MG capsule Take 300 mg by mouth At Bedtime 5/6/2019 at pm Yes Unknown, Entered By History   levothyroxine (SYNTHROID/LEVOTHROID) 75 MCG tablet TAKE ONE TABLET BY MOUTH ONE TIME DAILY  5/7/2019 at am Yes Piper Kiser MD   lisinopril (PRINIVIL/ZESTRIL) 2.5 MG tablet Take 1 tablet (2.5 mg) by mouth daily 5/7/2019 at am Yes Piper Kiser MD   metoprolol succinate (TOPROL-XL) 25 MG 24 hr tablet Take 1 tablet (25 mg) by mouth daily 5/7/2019 at am Yes Piper Kiser MD   Multiple Vitamins-Minerals (MULTIVITAMIN GUMMIES ADULT PO) Take 2 tablets by mouth daily  5/7/2019 at am Yes Reported, Patient   ondansetron (ZOFRAN-ODT) 4 MG ODT tab Take 1 tablet (4 mg) by mouth every 6 hours as needed for nausea or vomiting  at prn Yes Jocelin Streeter PA-C   sertraline (ZOLOFT) 25 MG tablet Take 1 tablet (25 mg) by mouth daily 5/7/2019 at am Yes Piper Kiser MD   TRAMADOL HCL PO Take 50 mg by mouth every 6 hours as needed for moderate to severe pain   at prn Yes Reported, Patient   traZODone (DESYREL) 50 MG tablet TAKE 2 TABLETS BY MOUTH NIGHTLY AS NEEDED FOR SLEEP 5/6/2019 at pm Yes Piper Kiser MD   UNABLE TO FIND Inhale 1 puff into the lungs 2 times daily MEDICATION NAME: Red inhaler, most likely Symbicort  80-4.5 5/7/2019 at am Yes Unknown, Entered By History   mometasone-formoterol (DULERA) 200-5 MCG/ACT inhaler Inhale 2 puffs into the lungs 2 times daily  Patient not taking: Reported on 4/16/2019  at not started yet  Piper Kiser MD   order for DME Equipment being ordered: portable oxygen;   Ambulatory O2 saturation of 87%  Patient not taking: Reported on 4/16/2019   Piper Kiser MD   order for DME Equipment being ordered: Nebulizer  Fax order to [facility] [phone]   Piper Kiser MD

## 2019-05-07 NOTE — H&P
[facility]  Hospitalist Admission Note  Name: Marie Kline    MRN: [id number]  YOB: 1932    Age: 87 year old  Date of admission: 5/7/2019  Primary care provider: Piper Kiser    Chief Complaint:  Weakness, cough    Assessment and Plan:   Acute hypoxemic respiratory failure, suspect viral URI: Unwell for 3 days with more productive cough than baseline with yellow sputum, headache, diffuse weakness.  Has had some shaking past couple of days, has not checked her temperature at home.  Afebrile here.  Hypoxic.  No infiltrate on chest x-ray.  Chronic leukocytosis that is worsening from her CLL.  Suspect likely viral URI versus bronchitis.  Requiring 3 L oxygen to keep O2 sats in the low 90s.  Has a concentrator at home for use as needed which she has not been using and denies shortness of breath at this time.  -Supplemental oxygen  -Steroids, nebs, Z-Tyson    COPD with acute exacerbation: Patient with history of known COPD.  Has been on oxygen in the past, but she is not taking it currently.  Hypoxic to the mid 80 level in clinic.  Now on 3 L here.  Decreased air movement overall with late expiratory wheeze in the ED.  Suspect acute COPD exacerbation secondary to viral URI.  -Received 125 mg methylprednisolone in the ED, continue with methylprednisolone 40 mg IV bid   -Duo nebs 4 times daily and albuterol nebs as needed  -Back  -Continue Dulera    Hyponatremia, ISELA on CKD stage III: baseline creatinine 1.0.  Presenting creatinine 1.43.  Is on Lasix 20 mg daily and low-dose lisinopril.  Sodium level mildly low at 132.  Suspect she is hypovolemic.  Received 1 L normal saline in the ED.  -Hold her Lasix and lisinopril  -Continue normal saline at 100 ml/hr x 10 hrs    History of stress cardiomyopathy: History of likely stress cardiomyopathy with TTE from 12/2018 showing EF 35-40% with apical hypokinesis and coronary angiogram at that time showing no significant coronary disease.  Previous  echocardiograms without evidence for diastolic or systolic heart failure.  She does appear to be on Lasix 20 mg daily which will be held for acute kidney injury.  No edema on exam.    HTN: PTA on lisinopril 2.5 mg daily, metoprolol XL 25 mg daily, and Lasix 20 mg daily.  Blood pressure 118/68.  -Resume metoprolol, but hold lisinopril and Lasix    HLD: Continue atorvastatin 20 mg daily.    Hypothyroidism: Continue PTA levothyroxine.  TSH low end of normal.    Osteoporosis, chronic back pain: Continue acetaminophen and gabapentin 300 milligrams twice daily.  Also uses tramadol 50 mg every 6 hours as needed which will be continued.  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  Has lost 10 pounds unintentionally since then.    CLL: Known history of CLL.  White blood cell count is 124, has had a progressive rise.      Anxiety: Continue PTA sertraline 25 mg and trazodone 100 mg at bedtime as needed for sleep.    History of bladder cancer      DVT Prophylaxis: Enoxaparin (Lovenox) SQ  Code Status: DNR/DNI, discussed with patient at length today who was very clear she would not want CPR or intubation  FEN: Regular diet, 100 x 10 hours ml/hr NS  Discharge Dispo: Likely home if weakness persisting may need PT consult  Estimated Disch Date / # of Days until Disch: Admit inpatient for COPD exacerbation with hypoxia and acute kidney injury.  Anticipate minimal 2 night hospitalization.      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  She has felt generally unwell for the past 3 days.  She has a chronic cough that is much more productive now of yellow sputum.  Has  not checked her temperature, but has had some chills at home.  Also notes she is been having shaking episodes.  She x-rays not felt very short of breath and has not used her as needed supplemental oxygen in some time.  She uses a neb treatment in the morning and at night.  She was around her grandchildren this past weekend, but denies that they were coughing or appearing sick.  She says she drinks plenty of water and has been taking her Lasix pill as prescribed.  Denies any lower extremity edema.  No abdominal pain, nausea, vomiting, diarrhea, dysuria, chest pain.    History obtained from patient, medical record, and from Dr. Montoya in the emergency department.  Blood pressure 118/68.  Pulse 60-78.  Temperature 97.9.  Oxygen 90-95% on 3 L O2.  Sodium low at 132.  Creatinine above baseline 1.43.  Bicarbonate 34.  White blood cell count 124.  Hemoglobin 12.6.  Rapid influenza antigen testing negative.  Chest x-ray without any infiltrate to suggest bacterial pneumonia.  EKG showing normal sinus rhythm without ST elevation or depression.  BNP not elevated.  Troponin undetectable.  She was given 125 mg IV Solu-Medrol and 3 duo nebs for COPD exacerbation.  Also given 1 L normal saline as she appeared hypovolemic.  Admit to inpatient.     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    TR 2-3+ per echo     Past Surgical History reviewed:  Past  Surgical History:   Procedure Laterality Date     BIOPSY LYMPH NODE INGUINAL Right 10/23/2018    Procedure: excsional biopsy right inguinal lymph node;  Surgeon: Reji Connors MD;  Location: [facility] OR     BLADDER SURGERY       CORONARY ANGIOGRAPHY ADULT ORDER  10/2014    minimal CAD     CV LEFT HEART CATH N/A 2018    Procedure: Left Heart Cath;  Surgeon: Sadiq Carrasco MD;  Location:  HEART CARDIAC CATH LAB     CYSTOSCOPY       CYSTOSCOPY, BIOPSY BLADDER, COMBINED N/A 2016    Procedure: COMBINED CYSTOSCOPY, BIOPSY BLADDER;  Surgeon: Kar Nuñez MD;  Location: [facility] OR     CYSTOSCOPY, TRANSURETHRAL RESECTION (TUR) TUMOR BLADDER, COMBINED N/A 2016    Procedure: COMBINED CYSTOSCOPY, TRANSURETHRAL RESECTION (TUR) TUMOR BLADDER;  Surgeon: Kar Nuñez MD;  Location: [facility] OR     ESOPHAGOSCOPY, GASTROSCOPY, DUODENOSCOPY (EGD), COMBINED N/A 2016    Procedure: COMBINED ESOPHAGOSCOPY, GASTROSCOPY, DUODENOSCOPY (EGD);  Surgeon: Freddie Diez MD;  Location:  GI     OPEN REDUCTION INTERNAL FIXATION HIP BIPOLAR Left 2018    Procedure: Left bipolar hemiarthroplasty;  Surgeon: Scar Reynolds MD;  Location: [facility] OR     Social History reviewed:  Social History     Tobacco Use     Smoking status: Former Smoker     Types: Cigarettes     Last attempt to quit: 2/3/1996     Years since quittin.2     Smokeless tobacco: Never Used   Substance Use Topics     Alcohol use: No     Alcohol/week: 0.0 oz     Social History     Social History Narrative     Not on file     Family History reviewed:  Family History   Problem Relation Age of Onset     Cerebrovascular Disease Mother      Cancer Father      Allergies:  Allergies   Allergen Reactions     Diagnostic X-Ray Materials Hives     CT DYE     Contrast Dye Hives     CT DYE     Prolia [Denosumab]      Osteonecrosis jaw       Wound Dressing Adhesive      Medications:  Prior to Admission medications    Medication Sig Last Dose  Taking? Auth Provider   albuterol (PROAIR HFA/PROVENTIL HFA/VENTOLIN HFA) 108 (90 BASE) MCG/ACT Inhaler Inhale 2 puffs into the lungs every 6 hours as needed for wheezing   Piper Kiser MD   albuterol (PROVENTIL) (2.5 MG/3ML) 0.083% neb solution Take 2.5 mg by nebulization every 6 hours as needed for shortness of breath / dyspnea or wheezing   Reported, Patient   aspirin 325 MG EC tablet Take 1 tablet (325 mg) by mouth daily   Jocelin Streeter PA-C   atorvastatin (LIPITOR) 20 MG tablet TAKE ONE TABLET BY MOUTH ONE TIME DAILY    Piper Kiser MD   calcium carbonate-vitamin D (OS-CARLOS) 500-400 MG-UNIT tablet Take 1 tablet by mouth daily   Reported, Patient   ferrous sulfate (IRON) 325 (65 FE) MG tablet Take by mouth daily (with breakfast)   Reported, Patient   furosemide (LASIX) 20 MG tablet Take 1 tablet (20 mg) by mouth daily   Piper Kiser MD   gabapentin (NEURONTIN) 300 MG capsule Take 1 capsule (300 mg) by mouth 2 times daily   Piper Kiser MD   levothyroxine (SYNTHROID/LEVOTHROID) 75 MCG tablet TAKE ONE TABLET BY MOUTH ONE TIME DAILY    Piper Kiser MD   lisinopril (PRINIVIL/ZESTRIL) 2.5 MG tablet Take 1 tablet (2.5 mg) by mouth daily   Piper Kiser MD   metoprolol succinate (TOPROL-XL) 25 MG 24 hr tablet Take 1 tablet (25 mg) by mouth daily   Piper Kiser MD   mometasone-formoterol (DULERA) 200-5 MCG/ACT inhaler Inhale 2 puffs into the lungs 2 times daily  Patient not taking: Reported on 4/16/2019   Piper Kiser MD   Multiple Vitamins-Minerals (MULTIVITAMIN GUMMIES ADULT PO) Take 2 tablets by mouth daily    Reported, Patient   ondansetron (ZOFRAN-ODT) 4 MG ODT tab Take 1 tablet (4 mg) by mouth every 6 hours as needed for nausea or vomiting   Jocelin Streeter PA-C   order for DME Equipment being ordered: portable oxygen;   Ambulatory O2 saturation of 87%  Patient not taking: Reported on 4/16/2019   Piper Kiser MD   order for DME  Equipment being ordered: Nebulizer  Fax order to [facility] [phone]   Piper Kiser MD   sertraline (ZOLOFT) 25 MG tablet Take 1 tablet (25 mg) by mouth daily   Piper Kiser MD   TRAMADOL HCL PO Take 50 mg by mouth every 6 hours as needed for moderate to severe pain    Reported, Patient   traZODone (DESYREL) 50 MG tablet TAKE 2 TABLETS BY MOUTH NIGHTLY AS NEEDED FOR SLEEP   Piper Kiser MD     Review of Systems:  A Comprehensive greater than 10 system review of systems was carried out.  Pertinent positives and negatives are noted above.  Otherwise negative.     Physical Exam:  Blood pressure 122/61, pulse 76, temperature 98.9  F (37.2  C), temperature source Oral, resp. rate 22, SpO2 96 %, not currently breastfeeding.  Wt Readings from Last 1 Encounters:   05/07/19 42.2 kg (93 lb)     Exam:  Constitutional: Awake, NAD   Eyes: sclera white   HEENT: atraumatic, dry mucous membranes  Respiratory: Reduced air movement globally, late expiratory wheeze, no crackles  Cardiovascular: RRR.  No murmur   GI: non-tender, not distended, bowel sounds present  Skin: no rash or lesions, acyanotic  Musculoskeletal/extremities: Decreased muscle bulk, no peripheral edema  Neurologic: Alert, oriented to date and place, speech clear   Psychiatric: calm, cooperative, normal affect    Lab and imaging data personally reviewed:  Labs:  Recent Labs   Lab 05/07/19  1324   .0*   HGB 12.6   HCT 41.8   MCV 93   PLT PENDING     Recent Labs   Lab 05/07/19  1324 05/01/19  1144   *  --    POTASSIUM 3.7  --    CHLORIDE 93*  --    CO2 34*  --    ANIONGAP 5  --    *  --    BUN 29  --    CR 1.43*  --    GFRESTIMATED 33*  --    GFRESTBLACK 38*  --    CARLOS 9.4 9.6     Recent Labs   Lab 05/07/19  1324   NTBNPI 423     Rapid influenza antigen negative    EKG: NSR, no ST elevation or depression    Imaging:  Recent Results (from the past 24 hour(s))   XR Chest 2 Views    Narrative    XR CHEST 2 VW  5/7/2019 2:14 PM    HISTORY: cough      Impression    IMPRESSION: Emphysematous changes. Possible nodular changes in the  lung apices bilaterally are prominent than previous exams. Consider CT  for further evaluation. Prominent thoracal lumbar scoliosis.    MD Sincere MONAHAN MD  Hospitalist  [facility]

## 2019-05-07 NOTE — NURSING NOTE
"/68   Pulse 60   Temp 97.9  F (36.6  C) (Oral)   Resp 12   Ht 1.499 m (4' 11\")   Wt 42.2 kg (93 lb)   SpO2 90%   BMI 18.78 kg/m      "

## 2019-05-07 NOTE — ED NOTES
[facility]  ED Nurse Handoff Report    Marie Kline is a 87 year old female   ED Chief complaint: Altered Mental Status  . ED Diagnosis:   Final diagnoses:   COPD exacerbation (H)   Hypoxia     Allergies:   Allergies   Allergen Reactions     Diagnostic X-Ray Materials Hives     CT DYE     Contrast Dye Hives     CT DYE     Prolia [Denosumab]      Osteonecrosis jaw       Wound Dressing Adhesive        Code Status: Full Code  Activity level - Baseline/Home:  Independent. Activity Level - Current:   Stand with Assist. Lift room needed: No. Bariatric: No   Needed: No   Isolation: No. Infection: Not Applicable.     Vital Signs:   Vitals:    05/07/19 1345 05/07/19 1400 05/07/19 1415 05/07/19 1424   BP:  124/51 105/62    Pulse:  74 87    Resp: 22      Temp:       TempSrc:       SpO2: 95%   94%       Cardiac Rhythm:  ,      Pain level: 0-10 Pain Scale: 0  Patient confused: No. Patient Falls Risk: Yes.   Elimination Status: Has not voided yet   Patient Report - Initial Complaint: Cough, Hypoxia . Focused Assessment: Marie Kline is a 87 year old female with a history of COPD, CHF, and hypertension who presents for evaluation of generalized weakness. On 5/4/2019, the patient started to develop generalized weakness and shaking chills. She has had an intermittent productive cough and has been using her nebulizers at home. This morning, the patient went into her clinic and was found to be hypoxic on room air with a fever. Due to this, the patient was advised to seek evaluation in the ED. She denies feelings of shortness of breath or any recent chest pain, abdominal pain, nausea, or vomiting.     13:15 Respiratory Respiratory - Respiratory WDL: cough; breath sounds  Breath Sounds: All Fields  Cough Frequency: frequent  Cough Type: good; nonproductive   Head To Toe Assessment - All Lung Fields Breath Sounds: Posterior:; diminished        Tests Performed: Chest Xray. Abnormal Results:   Labs Ordered and  Resulted from Time of ED Arrival Up to the Time of Departure from the ED   CBC WITH PLATELETS DIFFERENTIAL - Abnormal; Notable for the following components:       Result Value    .0 (*)     MCHC 30.1 (*)     RDW 16.8 (*)     All other components within normal limits   BASIC METABOLIC PANEL - Abnormal; Notable for the following components:    Sodium 132 (*)     Chloride 93 (*)     Carbon Dioxide 34 (*)     Glucose 122 (*)     Creatinine 1.43 (*)     GFR Estimate 33 (*)     GFR Estimate If Black 38 (*)     All other components within normal limits   TROPONIN I   NT PROBNP INPATIENT   ISTAT CG4 GASES LACTATE SHIKHA NURSING POCT   INFLUENZA A/B ANTIGEN   BLOOD CULTURE   BLOOD CULTURE     XR Chest 2 Views   Final Result   IMPRESSION: Emphysematous changes. Possible nodular changes in the   lung apices bilaterally are prominent than previous exams. Consider CT   for further evaluation. Prominent thoracal lumbar scoliosis.      ROSALIA GUAN MD          Treatments provided:Medication (see MAR)  Family Comments: Patient called family.  OBS brochure/video discussed/provided to patient:  No  ED Medications:   Medications   ipratropium - albuterol 0.5 mg/2.5 mg/3 mL (DUONEB) neb solution 3 mL (has no administration in time range)   0.9% sodium chloride BOLUS (1,000 mLs Intravenous New Bag 5/7/19 1329)   methylPREDNISolone sodium succinate (solu-MEDROL) injection 125 mg (125 mg Intravenous Given 5/7/19 1418)     Drips infusing:  Yes  For the majority of the shift, the patient's behavior Green. Interventions performed were NA.     Severe Sepsis OR Septic Shock Diagnosis Present: No      ED Nurse Name/Phone Number: Yuko Helms,   2:30 PM    RECEIVING UNIT ED HANDOFF REVIEW    Above ED Nurse Handoff Report was reviewed: Yes  Reviewed by: Tanya iRvas on May 7, 2019 at 3:18 PM

## 2019-05-08 ENCOUNTER — TELEPHONE (OUTPATIENT)
Dept: CARE COORDINATION | Facility: CLINIC | Age: 84
End: 2019-05-08

## 2019-05-08 LAB
ANION GAP SERPL CALCULATED.3IONS-SCNC: 2 MMOL/L (ref 3–14)
BUN SERPL-MCNC: 22 MG/DL (ref 7–30)
CALCIUM SERPL-MCNC: 8.5 MG/DL (ref 8.5–10.1)
CHLORIDE SERPL-SCNC: 101 MMOL/L (ref 94–109)
CO2 SERPL-SCNC: 32 MMOL/L (ref 20–32)
CREAT SERPL-MCNC: 0.9 MG/DL (ref 0.52–1.04)
ERYTHROCYTE [DISTWIDTH] IN BLOOD BY AUTOMATED COUNT: 16.6 % (ref 10–15)
GFR SERPL CREATININE-BSD FRML MDRD: 57 ML/MIN/{1.73_M2}
GLUCOSE SERPL-MCNC: 150 MG/DL (ref 70–99)
HCT VFR BLD AUTO: 32 % (ref 35–47)
HGB BLD-MCNC: 9.9 G/DL (ref 11.7–15.7)
INTERPRETATION ECG - MUSE: NORMAL
MCH RBC QN AUTO: 28.3 PG (ref 26.5–33)
MCHC RBC AUTO-ENTMCNC: 30.9 G/DL (ref 31.5–36.5)
MCV RBC AUTO: 91 FL (ref 78–100)
PLATELET # BLD AUTO: 87 10E9/L (ref 150–450)
POTASSIUM SERPL-SCNC: 4.1 MMOL/L (ref 3.4–5.3)
RBC # BLD AUTO: 3.5 10E12/L (ref 3.8–5.2)
SODIUM SERPL-SCNC: 135 MMOL/L (ref 133–144)
WBC # BLD AUTO: 73.5 10E9/L (ref 4–11)

## 2019-05-08 PROCEDURE — A9270 NON-COVERED ITEM OR SERVICE: HCPCS | Performed by: INTERNAL MEDICINE

## 2019-05-08 PROCEDURE — 99233 SBSQ HOSP IP/OBS HIGH 50: CPT | Performed by: INTERNAL MEDICINE

## 2019-05-08 PROCEDURE — 36415 COLL VENOUS BLD VENIPUNCTURE: CPT | Performed by: INTERNAL MEDICINE

## 2019-05-08 PROCEDURE — 12000000 ZZH R&B MED SURG/OB

## 2019-05-08 PROCEDURE — 40000275 ZZH STATISTIC RCP TIME EA 10 MIN

## 2019-05-08 PROCEDURE — 80048 BASIC METABOLIC PNL TOTAL CA: CPT | Performed by: INTERNAL MEDICINE

## 2019-05-08 PROCEDURE — 85027 COMPLETE CBC AUTOMATED: CPT | Performed by: INTERNAL MEDICINE

## 2019-05-08 PROCEDURE — 94640 AIRWAY INHALATION TREATMENT: CPT | Mod: 76

## 2019-05-08 PROCEDURE — 25000128 H RX IP 250 OP 636: Performed by: INTERNAL MEDICINE

## 2019-05-08 PROCEDURE — 94640 AIRWAY INHALATION TREATMENT: CPT

## 2019-05-08 PROCEDURE — 25000132 ZZH RX MED GY IP 250 OP 250 PS 637: Performed by: INTERNAL MEDICINE

## 2019-05-08 PROCEDURE — 25000125 ZZHC RX 250: Performed by: INTERNAL MEDICINE

## 2019-05-08 RX ORDER — MULTIPLE VITAMINS W/ MINERALS TAB 9MG-400MCG
1 TAB ORAL DAILY
Status: DISCONTINUED | OUTPATIENT
Start: 2019-05-09 | End: 2019-05-15 | Stop reason: HOSPADM

## 2019-05-08 RX ADMIN — FLUTICASONE FUROATE AND VILANTEROL TRIFENATATE 1 PUFF: 200; 25 POWDER RESPIRATORY (INHALATION) at 07:30

## 2019-05-08 RX ADMIN — METHYLPREDNISOLONE SODIUM SUCCINATE 40 MG: 40 INJECTION, POWDER, FOR SOLUTION INTRAMUSCULAR; INTRAVENOUS at 20:23

## 2019-05-08 RX ADMIN — METHYLPREDNISOLONE SODIUM SUCCINATE 40 MG: 40 INJECTION, POWDER, FOR SOLUTION INTRAMUSCULAR; INTRAVENOUS at 09:26

## 2019-05-08 RX ADMIN — GABAPENTIN 300 MG: 300 CAPSULE ORAL at 20:24

## 2019-05-08 RX ADMIN — ENOXAPARIN SODIUM 30 MG: 30 INJECTION SUBCUTANEOUS at 20:28

## 2019-05-08 RX ADMIN — AZITHROMYCIN 250 MG: 250 TABLET, FILM COATED ORAL at 09:26

## 2019-05-08 RX ADMIN — IPRATROPIUM BROMIDE AND ALBUTEROL SULFATE 3 ML: .5; 3 SOLUTION RESPIRATORY (INHALATION) at 19:42

## 2019-05-08 RX ADMIN — IPRATROPIUM BROMIDE AND ALBUTEROL SULFATE 3 ML: .5; 3 SOLUTION RESPIRATORY (INHALATION) at 07:30

## 2019-05-08 RX ADMIN — ATORVASTATIN CALCIUM 20 MG: 20 TABLET, FILM COATED ORAL at 09:26

## 2019-05-08 RX ADMIN — IPRATROPIUM BROMIDE AND ALBUTEROL SULFATE 3 ML: .5; 3 SOLUTION RESPIRATORY (INHALATION) at 11:17

## 2019-05-08 RX ADMIN — FERROUS SULFATE TAB 325 MG (65 MG ELEMENTAL FE) 325 MG: 325 (65 FE) TAB at 09:26

## 2019-05-08 RX ADMIN — METOPROLOL SUCCINATE 25 MG: 25 TABLET, EXTENDED RELEASE ORAL at 09:26

## 2019-05-08 RX ADMIN — OYSTER SHELL CALCIUM WITH VITAMIN D 1 TABLET: 500; 200 TABLET, FILM COATED ORAL at 09:26

## 2019-05-08 RX ADMIN — ASPIRIN 325 MG: 325 TABLET, DELAYED RELEASE ORAL at 20:24

## 2019-05-08 RX ADMIN — TRAZODONE HYDROCHLORIDE 100 MG: 100 TABLET ORAL at 22:33

## 2019-05-08 RX ADMIN — ACETAMINOPHEN 650 MG: 325 TABLET, FILM COATED ORAL at 14:39

## 2019-05-08 RX ADMIN — IPRATROPIUM BROMIDE AND ALBUTEROL SULFATE 3 ML: .5; 3 SOLUTION RESPIRATORY (INHALATION) at 15:26

## 2019-05-08 RX ADMIN — LEVOTHYROXINE SODIUM 75 MCG: 75 TABLET ORAL at 09:26

## 2019-05-08 RX ADMIN — SERTRALINE HYDROCHLORIDE 25 MG: 25 TABLET ORAL at 09:26

## 2019-05-08 ASSESSMENT — ACTIVITIES OF DAILY LIVING (ADL)
ADLS_ACUITY_SCORE: 17
ADLS_ACUITY_SCORE: 15
ADLS_ACUITY_SCORE: 17

## 2019-05-08 NOTE — PROGRESS NOTES
Discharge Planner   Discharge Plans in progress: [facility]  Barriers to discharge plan: continue inpatient care  Follow up plan:  Follow up appointment scheduled with Dr. Kiser at [facility], Monday, May 20th at 11:00am.                Entered by: Vaishnavi Garcias 05/08/2019 2:22 PM

## 2019-05-08 NOTE — CONSULTS
CLINICAL NUTRITION SERVICES  -  ASSESSMENT NOTE      Recommendations Ordered by Registered Dietitian (RD):     Oral nutrition supplements    MVI+M    Room service with assist for meal ordering guidance    Mechanical/dental soft foods   Malnutrition: *  % Weight Loss:> 10% in 6 months (severe malnutrition)  % Intake:</= 75% for >/= 1 month (severe malnutrition)  Subcutaneous Fat Loss: Severe observed in Orbital region, Upper arm region, Thoracic region    Muscle Loss: Severe observed in Temporal region, Clavicle bone region, Acromion bone region, Scapular bone region and Dorsal hand region   Fluid Retention:None noted    Malnutrition Diagnosis: Severe malnutrition  In Context of:  Chronic illness or disease     REASON FOR ASSESSMENT  Marie Kline is a 87 year old female seen by the dietitian for positive nutrition risk screen - Unintentional weight loss of 10# or more in past 2 months, Reduced oral intake over the last month    History of hypertension, hyperlipidemia, anxiety, CLL, hypothyroidism, bladder cancer, 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    Typical food/fluid intake PTA: decreased for 2+ months    2 months ago, upper teeth fell out as a medication side effect. Upper plate was fitted for one week, but due to severity of illness over the last 2+ months, no longer fit after one week and unable to wear currently despite being adjusted since. Relying on puddings, milk, ice cream, oatmeal, cream of wheat donuts, soft toast, coffee and 2 Boost/Ensure shakes mixed with ice cream per day.    Living situation: [facility], receives 20 meals per month and attending recently only if soft foods are offered    Meal preparation and grocery shopping: cub delivery or family assists    Factors affecting nutrition intake include: chewing difficulty, decreased appetite, early  "satiety    CURRENT NUTRITION ORDERS    Diet: Regular    Supplement: none   Current Intake/Tolerance:    Per flow sheet review, 75% intake for 1 documented meal - not hungry for lunch, small dinner meal ordered.     Factors affecting nutrition intake include: decreased appetite, early satiety, chewing difficulty    NUTRITION FOCUSED PHYSICAL ASSESSMENT (NFPA) + PHYSICAL FINDINGS  Completed:  Yes Partial assessment - lower extremities not fully observed based on positioning  Observed:    Muscle wasting (refer to documentation in Malnutrition section), Subcutaneous fat loss (refer to documentation in Malnutrition section) and Dry skin, poor skin turgor  Obtained from Chart/Interdisciplinary Team    Mariusz nutrition score: 2; total score: 16    BM: 5/8    Skin: coccyx blanchable, red    Generalized weakness    ANTHROPOMETRICS  Height: 4' 11.016\"  Weight: 43 kg   Body mass index is 19.38 kg/m .  Weight Status:  Normal BMI (low for age)  Ideal body weight: 44.5 kg +/- 10%, 98% of IBW   Weight History:  20% weight loss in 6 months which patient confirms, as noted below  Wt Readings from Last 10 Encounters:   05/07/19 43.5 kg (96 lb)   05/07/19 42.2 kg (93 lb)   01/14/19 47.1 kg (103 lb 13.4 oz)   12/19/18 46.5 kg (102 lb 8 oz)   12/10/18 48.3 kg (106 lb 6.4 oz)   11/29/18 54.4 kg (120 lb)   11/23/18 54.4 kg (119 lb 14.4 oz)   11/18/18 47.2 kg (104 lb)   10/23/18 46.7 kg (103 lb)   10/18/18 45.4 kg (100 lb)       ASSESSED NUTRITION NEEDS  (PER APPROVED PRACTICE GUIDELINES, Dosing weight: 44 kg)  Estimated Energy Needs: 9888-3864+ kcals (30-35+ Kcal/Kg)  Justification: repletion  Estimated Protein Needs: 53-66+ grams protein (1.2-1.5+ g pro/Kg)  Justification: preservation of lean body mass + repletion  Estimated Fluid Needs: >1 mL/Kcal  Justification: maintenance    LABS  Labs reviewed  Lab Results   Component Value Date    URINEKETONE 5 12/08/2018     Recent Labs   Lab Test 05/08/19  0708 05/07/19  1324 01/14/19  1336 " 01/07/19  1903 12/19/18  1239   POTASSIUM 4.1 3.7 4.6 4.2 4.1     Recent Labs   Lab Test 05/01/19  1144 03/12/18  1009 05/12/17  1250 03/27/17  0550 03/26/17  0605   PHOS 3.8 3.9 3.3 2.9 2.3*     Recent Labs   Lab Test 05/01/19  1144 12/08/18  0651 03/12/18  1009 03/24/17  0545 03/23/17  0820   MAG 2.0 2.4* 2.1 1.7 2.4*     Recent Labs   Lab Test 05/08/19  0708 05/07/19  1324 01/14/19  1336 01/07/19  1903 12/19/18  1239    132* 135 138 137     Recent Labs   Lab Test 05/08/19  0708 05/07/19  1324 01/14/19  1336 01/07/19  1903 12/19/18  1239   CR 0.90 1.43* 1.01 1.01 1.00     Recent Labs   Lab 05/08/19  0708 05/07/19  1324   * 122*     Lab Results   Component Value Date    A1C 5.6 06/17/2016    A1C 5.6 06/16/2016    A1C 5.6 10/09/2014    A1C 5.7 07/18/2014       MEDICATIONS    Medications reviewed    Oscal with Vitamin D    Ferrous sulfate 325 mg    Solumedrol    MALNUTRITION:  % Weight Loss:> 10% in 6 months (severe malnutrition)  % Intake:</= 75% for >/= 1 month (severe malnutrition)  Subcutaneous Fat Loss: Severe observed in Orbital region, Upper arm region, Thoracic region    Muscle Loss: Severe observed in Temporal region, Clavicle bone region, Acromion bone region, Scapular bone region and Dorsal hand region   Fluid Retention:None noted    Malnutrition Diagnosis: Severe malnutrition  In Context of:  Chronic illness or disease    NUTRITION DIAGNOSIS:  Malnutrition related to chewing difficulty, early satiety and increased needs for healing and chronic disease as evidenced by intake of <75% of estimated needs for >1-3 months, 20% weight loss in <6 months, fat and muscle wasting    INTERVENTIONS  Recommendations / Nutrition Prescription  Continue regular diet as ordered (ok to order mechanical/dental soft diet items, patient comfortable self selecting) + oral nutrition supplements to increase calorie and protein intake between meals + Room service with assist for meal ordering  guidance  Multivitamin+Mineral    Implementation  Nutrition education: reviewed oral nutrition supplements, small frequent meals to increase intake and provided mechanical/dental soft food menu to order prn  Multivitamin/Minerals: Thera-Vit-M, continue Ca + vitamin D given steroid use  Medical food supplement: chocolate Boost 10 am, high protein milkshake 2 pm and HS  Collaboration and Referral of care: Discussed patient during interdisciplinary care rounds this morning    Goals  Patient to consume >/= 75% of meals TID and >/= 2 high protein supplements per day      MONITORING AND EVALUATION:  Progress towards goals will be monitored and evaluated per protocol and Practice Guidelines      Fabienne Mcdonald RDN, LD, CNSC  Pager - 3rd floor/ICU: [phone]  Pager - All other floors: [phone]  Pager - Weekend/holiday: [phone]  Office: [phone]

## 2019-05-08 NOTE — TELEPHONE ENCOUNTER
Received call from Mai Luong at [facility] Care transitions team to update RN CC patient is admitted to hospital. Patient will be treated for COPD exacerbation.  Will continue to follow.  Cherise will notify Mai CH with patient discharge plan.    Babs Garcia RN  Care Coordination  Phone:  [phone]  Email: [email or web address]

## 2019-05-08 NOTE — CONSULTS
Care Transition Initial Assessment - RN        Met with: Patient.  DATA   Active Problems:    COPD with acute exacerbation (H)       Cognitive Status: awake, alert and oriented.  Primary Care Clinic Name: [facility]  Primary Care MD Name: Dr. ABBY Kiser  Contact information and PCP information verified: Yes  Lives With: alone         Description of Support System: Supportive   Who is your support system?: Children       Insurance concerns: No Insurance issues identified  ASSESSMENT  Patient currently receives the following services:  None        Identified issues/concerns regarding health management: COPD exacebation    Patient admitted with COPD exacebation with suspect viral URI hypoxemic respiratory failure. CTS following for COPD and elevated CARLOS MANUEL. Patient lives independently at [facility]. She receives meal services, 20 meals/month. Patient managing her own medications. She gets her prescriptions mailed from [facility]. She reports no problems taking or getting her medications. Patient uses a cane as needed. She has portable oxygen and nebulizer equipment at home. She uses them independently as needed. Patient drives herself to appointments. Her neighbor and family are supportive. Per patient request, follow up appointment requested with Dr. Kiser.  Patient doesn't anticipate any discharge needs.      PLAN  Patient anticipates discharging to Independent living facility .        Patient anticipates needs for home equipment: No  Plan/Disposition: Home   Appointments: Follow up appointment scheduled with Dr. Kiser at [facility], Monday, May 20th at 11:00am.     RN clinic care coordinator, Babs Garcia, will follow patient at discharge. Hand off will be sent to clinic.    Care  (CTS) will continue to follow as needed.    Vaishnavi Garcias RN BSN CTS  Care Management Team  [facility]

## 2019-05-08 NOTE — PLAN OF CARE
VSS. Pt denies pain, cp, and SOB. 3 LPM NC O2 sats >93%. A&O X4. LS diminished and coarse crackles in RLL and LLL. Productive cough with yellow sputum. Pt requesting to speak with calvin, consult requested for tomorrow. Prn trazodone and melatonin given for bed.

## 2019-05-08 NOTE — PLAN OF CARE
A/Ox4, SBA for all transfers, VSS, Regular diet, PIV infusing NS until approx. 0300 - SL per orders, on 3L O2, LS coarse with crackles to BLL, on IV steroids, nebs, and zithromax, denies pain - does report SOB with activity, infrequent productive cough, pt requesting spiritual health consult for tomorrow, plan to discharge to [facility] 2+ nights, continue with plan of care.

## 2019-05-08 NOTE — PROGRESS NOTES
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 Acute hypoxic respiratory failure secondary to COPD exacerbation.  2. COPD with acute exacerbation suspect viral upper respiratory infection.  -Continue bronchodilators, nebulizers, steroids (Solu-Medrol 40 mg daily), may change to oral dose in AM.  -Monitor pulse ox, titrate oxygen down and wean her off  -Continue antibiotics, Zithromax.  -Infiltrate on chest x-ray, may need to repeat chest x-ray in 4 to 6 weeks, if he does not improve consider CT scan in 4-6  3. Hyponatremia  4. Acute kidney injury on chronic kidney disease stage III.  -Likely due to dehydration and diuretics  -Improved with hydration  -Continue to hold Lasix.  -Creatinine down to 0.9 from 1.43  -Sodium also improved to 135  5. Anxiety-continue sertraline 25 mg and trazodone 100 mg at bedtime  6. CLL-she has history of CLL, WBC is always elevated, it was 124K on admission, today down to 73K..  7.     DVT Prophylaxis: Enoxaparin (Lovenox) SQ  Code Status: DNR / DNI  Discharge Dispo: Home   Estimated Disch Date / # of Days until Disch: 2 days if she continues to improve.  I discussed with patient the plan of care.      Interval History (Subjective):      Patient seen and examined, assumed acre today, H&P and labs reviewed, feels better, SOB and cough also better                  Physical Exam:      Last Vital Signs:  /61 (BP Location: Left arm)   Pulse 82   Temp 97.6  F (36.4  C) (Oral)   Resp 20   Ht 1.499 m (4' 11.02\")   Wt 43.5 kg (96 lb)  "  SpO2 94%   BMI 19.38 kg/m      I/O last 3 completed shifts:  In: 1015 [P.O.:440; I.V.:575]  Out: -   Wt Readings from Last 1 Encounters:   05/07/19 43.5 kg (96 lb)     Current Facility-Administered Medications   Medication     acetaminophen (TYLENOL) tablet 650 mg     albuterol (PROVENTIL) neb solution 2.5 mg     aspirin (ASA) EC tablet 325 mg     atorvastatin (LIPITOR) tablet 20 mg     azithromycin (ZITHROMAX) tablet 250 mg     calcium carbonate-vitamin D (OSCAL w/D) per tablet 1 tablet     enoxaparin (LOVENOX) injection 30 mg     ferrous sulfate (FEROSUL) tablet 325 mg     fluticasone-vilanterol (BREO ELLIPTA) 200-25 MCG/INH inhaler 1 puff     gabapentin (NEURONTIN) capsule 300 mg     ipratropium - albuterol 0.5 mg/2.5 mg/3 mL (DUONEB) neb solution 3 mL     levothyroxine (SYNTHROID/LEVOTHROID) tablet 75 mcg     melatonin tablet 1 mg     methylPREDNISolone sodium succinate (solu-MEDROL) injection 40 mg     metoprolol succinate ER (TOPROL-XL) 24 hr tablet 25 mg     naloxone (NARCAN) injection 0.1-0.4 mg     ondansetron (ZOFRAN-ODT) ODT tab 4 mg    Or     ondansetron (ZOFRAN) injection 4 mg     polyethylene glycol (MIRALAX/GLYCOLAX) Packet 17 g     senna-docusate (SENOKOT-S/PERICOLACE) 8.6-50 MG per tablet 1 tablet    Or     senna-docusate (SENOKOT-S/PERICOLACE) 8.6-50 MG per tablet 2 tablet     sertraline (ZOLOFT) tablet 25 mg     traMADol (ULTRAM) tablet 50 mg     traZODone (DESYREL) tablet 100 mg       Constitutional: Awake, alert, cooperative, no apparent distress   Respiratory: Good air entry, scattered wheezing bilaterally, no crackles.   Cardiovascular: Regular rate and rhythm, normal S1 and S2, and no murmur noted   Abdomen: Normal bowel sounds, soft, non-distended, non-tender   Skin: No rashes, no cyanosis, dry to touch.   Neuro: Alert and oriented x3, no weakness, numbness, memory loss   Extremities: No edema, normal range of motion   Other(s):HEENT Pink, unicteric, moist mucosa.       All other systems:  Negative          Medications:      All current medications were reviewed with changes reflected in problem list.         Data:      All new lab and imaging data was reviewed.   Labs:  Recent Labs   Lab 05/08/19  0708 05/07/19  1324    132*   POTASSIUM 4.1 3.7   CHLORIDE 101 93*   CO2 32 34*   ANIONGAP 2* 5   * 122*   BUN 22 29   CR 0.90 1.43*   GFRESTIMATED 57* 33*   GFRESTBLACK 67 38*   CARLOS 8.5 9.4     Recent Labs   Lab 05/08/19  0708 05/07/19  1324   WBC 73.5* 124.0*   HGB 9.9* 12.6   HCT 32.0* 41.8   MCV 91 93   PLT 87* 109*     Recent Labs   Lab 05/08/19  0708 05/07/19  1324   * 122*      Imaging:   Results for orders placed or performed during the hospital encounter of 05/07/19   XR Chest 2 Views    Narrative    XR CHEST 2 VW 5/7/2019 2:14 PM    HISTORY: cough      Impression    IMPRESSION: Emphysematous changes. Possible nodular changes in the  lung apices bilaterally are prominent than previous exams. Consider CT  for further evaluation. Prominent thoracal lumbar scoliosis.    ROSALIA GUAN MD

## 2019-05-08 NOTE — PROGRESS NOTES
"SPIRITUAL HEALTH SERVICES Progress Note  [facility] 3    Pt alert and seated in bedside chair, states she is less shaky and breathing better. Shared context of admission; went to clinic and \"they sent me right over\".     \"Pereira\" is known to me from a previous admission.  Marie expressed frustration at being in the hospital again, though she knows Fito is with her and she maintains a positive outlook.  Randall is active in her community at [facility] where she participates regularly in spiritual offerings in their chapel.    Randall is feeling concerned about her daughter, who had knee replacement yesterday.    Prayer requested and provided.  Provided prayer shawl.    Also, per pt request, will notify [place] volunteer ministers Eli of admission.    Plan: Spiritual Health Services remains available for additional emotional/spiritual support.    Jean Murphy MA  Staff   Pager: [phone]  Phone: [phone]        "

## 2019-05-09 PROCEDURE — 40000275 ZZH STATISTIC RCP TIME EA 10 MIN

## 2019-05-09 PROCEDURE — A9270 NON-COVERED ITEM OR SERVICE: HCPCS | Performed by: INTERNAL MEDICINE

## 2019-05-09 PROCEDURE — 99232 SBSQ HOSP IP/OBS MODERATE 35: CPT | Performed by: INTERNAL MEDICINE

## 2019-05-09 PROCEDURE — 94640 AIRWAY INHALATION TREATMENT: CPT | Mod: 76

## 2019-05-09 PROCEDURE — 94640 AIRWAY INHALATION TREATMENT: CPT

## 2019-05-09 PROCEDURE — 25000132 ZZH RX MED GY IP 250 OP 250 PS 637: Performed by: INTERNAL MEDICINE

## 2019-05-09 PROCEDURE — 25000125 ZZHC RX 250: Performed by: INTERNAL MEDICINE

## 2019-05-09 PROCEDURE — 12000000 ZZH R&B MED SURG/OB

## 2019-05-09 PROCEDURE — 25000128 H RX IP 250 OP 636: Performed by: INTERNAL MEDICINE

## 2019-05-09 PROCEDURE — 25000131 ZZH RX MED GY IP 250 OP 636 PS 637: Performed by: INTERNAL MEDICINE

## 2019-05-09 RX ORDER — PREDNISONE 20 MG/1
40 TABLET ORAL DAILY
Status: DISCONTINUED | OUTPATIENT
Start: 2019-05-09 | End: 2019-05-13

## 2019-05-09 RX ADMIN — FERROUS SULFATE TAB 325 MG (65 MG ELEMENTAL FE) 325 MG: 325 (65 FE) TAB at 08:34

## 2019-05-09 RX ADMIN — LEVOTHYROXINE SODIUM 75 MCG: 75 TABLET ORAL at 08:35

## 2019-05-09 RX ADMIN — ACETAMINOPHEN 650 MG: 325 TABLET, FILM COATED ORAL at 08:49

## 2019-05-09 RX ADMIN — MULTIPLE VITAMINS W/ MINERALS TAB 1 TABLET: TAB at 08:34

## 2019-05-09 RX ADMIN — ASPIRIN 325 MG: 325 TABLET, DELAYED RELEASE ORAL at 20:12

## 2019-05-09 RX ADMIN — TRAZODONE HYDROCHLORIDE 100 MG: 100 TABLET ORAL at 22:03

## 2019-05-09 RX ADMIN — FLUTICASONE FUROATE AND VILANTEROL TRIFENATATE 1 PUFF: 200; 25 POWDER RESPIRATORY (INHALATION) at 07:25

## 2019-05-09 RX ADMIN — ATORVASTATIN CALCIUM 20 MG: 20 TABLET, FILM COATED ORAL at 08:35

## 2019-05-09 RX ADMIN — ENOXAPARIN SODIUM 30 MG: 30 INJECTION SUBCUTANEOUS at 20:12

## 2019-05-09 RX ADMIN — IPRATROPIUM BROMIDE AND ALBUTEROL SULFATE 3 ML: .5; 3 SOLUTION RESPIRATORY (INHALATION) at 20:01

## 2019-05-09 RX ADMIN — SERTRALINE HYDROCHLORIDE 25 MG: 25 TABLET ORAL at 08:35

## 2019-05-09 RX ADMIN — GABAPENTIN 300 MG: 300 CAPSULE ORAL at 22:03

## 2019-05-09 RX ADMIN — IPRATROPIUM BROMIDE AND ALBUTEROL SULFATE 3 ML: .5; 3 SOLUTION RESPIRATORY (INHALATION) at 07:21

## 2019-05-09 RX ADMIN — PREDNISONE 40 MG: 20 TABLET ORAL at 17:00

## 2019-05-09 RX ADMIN — Medication 1 MG: at 23:29

## 2019-05-09 RX ADMIN — METHYLPREDNISOLONE SODIUM SUCCINATE 40 MG: 40 INJECTION, POWDER, FOR SOLUTION INTRAMUSCULAR; INTRAVENOUS at 08:35

## 2019-05-09 RX ADMIN — METOPROLOL SUCCINATE 25 MG: 25 TABLET, EXTENDED RELEASE ORAL at 08:35

## 2019-05-09 RX ADMIN — AZITHROMYCIN 250 MG: 250 TABLET, FILM COATED ORAL at 08:35

## 2019-05-09 RX ADMIN — ACETAMINOPHEN 650 MG: 325 TABLET, FILM COATED ORAL at 23:29

## 2019-05-09 RX ADMIN — OYSTER SHELL CALCIUM WITH VITAMIN D 1 TABLET: 500; 200 TABLET, FILM COATED ORAL at 08:35

## 2019-05-09 RX ADMIN — IPRATROPIUM BROMIDE AND ALBUTEROL SULFATE 3 ML: .5; 3 SOLUTION RESPIRATORY (INHALATION) at 11:26

## 2019-05-09 ASSESSMENT — ACTIVITIES OF DAILY LIVING (ADL)
ADLS_ACUITY_SCORE: 15
ADLS_ACUITY_SCORE: 17

## 2019-05-09 NOTE — PLAN OF CARE
Vss. A&Ox4 sats stable on 3LNC. Sob with exertion. Iv solumed switched to oral prednisone to start tonight. Calls aprop. Lose stools x2. Urgency.

## 2019-05-09 NOTE — PROGRESS NOTES
[facility]  Hospitalist Progress Note  Anoop Zacarias MD, MD 05/09/2019  (Text Page)  Reason for Stay (Diagnosis): COPD in exacerbation         Assessment and Plan:      I have utilize excerpts of my colleagues prior progress note as I took service last May 9, 2019.    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 Acute hypoxic respiratory failure secondary to COPD exacerbation.  2. COPD with acute exacerbation suspect viral upper respiratory infection.  -Continue bronchodilators, nebulizers, steroids (Solu-Medrol 40 mg daily), changed to oral prednisone   -Monitor pulse ox, titrate oxygen down and wean her off  -Continue antibiotics, Zithromax.  -Infiltrate on chest x-ray, may need to repeat chest x-ray in 4 to 6 weeks, if he does not improve consider CT scan in 4-6 weeks  3. Hyponatremia-resolved  4. Acute kidney injury on chronic kidney disease stage III.  -Likely due to dehydration and diuretics  -Improved with hydration  -Continue to hold Lasix.  -Creatinine down to 0.9 from 1.43  -Sodium also improved to 135  -We will resume her home Lasix upon discharge  5. Anxiety-continue sertraline 25 mg and trazodone 100 mg at bedtime  6. CLL-she has history of CLL, WBC is always elevated, it was 124K on admission,  down to 73K..     DVT Prophylaxis: Enoxaparin (Lovenox) SQ  Code Status: DNR / DNI  Discharge Dispo: Home   Estimated Disch Date / # of Days until Disch: 1-2 days if she continues to improve.            Interval History (Subjective):      I assume service today.  Seen and examined.  Chart reviewed.  Case discussed with nursing service.  Randall is feeling better as she slept decent overnight.  Her earlier  "shortness of breath is significantly improved.  She has better appetite and denies any ongoing nausea, vomiting.  Afebrile.  Stable hemodynamics  No reported mental status changes.              Physical Exam:      Last Vital Signs:  /59 (BP Location: Left arm)   Pulse 86   Temp 97.4  F (36.3  C) (Oral)   Resp 18   Ht 1.499 m (4' 11.02\")   Wt 43.5 kg (96 lb)   SpO2 98%   BMI 19.38 kg/m      I/O last 3 completed shifts:  In: 50 [P.O.:50]  Out: -   Wt Readings from Last 1 Encounters:   05/07/19 43.5 kg (96 lb)     Vitals:    05/07/19 1630   Weight: 43.5 kg (96 lb)       Constitutional: Awake, alert, cooperative, no apparent distress   Respiratory:  Fair air entry, scattered wheezes bilaterally, no crackles   Cardiovascular: Regular rate and rhythm, normal S1 and S2, and no murmur noted   Abdomen: Normal bowel sounds, soft, non-distended, non-tender   Skin: No rashes, no cyanosis, dry to touch   Neuro: Alert and oriented x3, no weakness, spontaneous and coherent speech   Extremities: No edema, normal range of motion   Other(s): Euthymic mood, not agitated    Frail-appearing   All other systems: Negative          Medications:      All current medications were reviewed with changes reflected in problem list.         Data:      All new lab and imaging data was reviewed.   Labs:  Recent Labs   Lab 05/07/19  1329 05/07/19  1324   CULT No growth after 2 days No growth after 2 days     No results for input(s): PH, PHARTERIAL, PO2, [id number], SAT, PCO2, HCO3, BASEEXCESS, BEBO, BEB in the last 168 hours.    Invalid input(s): [id number]  Recent Labs   Lab 05/08/19  0708 05/07/19  1324   WBC 73.5* 124.0*   HGB 9.9* 12.6   HCT 32.0* 41.8   MCV 91 93   PLT 87* 109*     Recent Labs   Lab 05/08/19  0708 05/07/19  1324    132*   POTASSIUM 4.1 3.7   CHLORIDE 101 93*   CO2 32 34*   ANIONGAP 2* 5   * 122*   BUN 22 29   CR 0.90 1.43*   GFRESTIMATED 57* 33*   GFRESTBLACK 67 38*   CARLOS 8.5 9.4     No results for " input(s): SED, CRP in the last 168 hours.  Recent Labs   Lab 05/08/19  0708 05/07/19  1324   * 122*     No results for input(s): INR in the last 168 hours.  Recent Labs   Lab 05/07/19  1324   TROPI <0.015     No results for input(s): COLOR, APPEARANCE, URINEGLC, URINEBILI, URINEKETONE, SG, UBLD, URINEPH, PROTEIN, UROBILINOGEN, NITRITE, LEUKEST, RBCU, WBCU in the last 168 hours.   Imaging:   Recent Results (from the past 48 hour(s))   XR Chest 2 Views    Narrative    XR CHEST 2 VW 5/7/2019 2:14 PM    HISTORY: cough      Impression    IMPRESSION: Emphysematous changes. Possible nodular changes in the  lung apices bilaterally are prominent than previous exams. Consider CT  for further evaluation. Prominent thoracal lumbar scoliosis.    ROSALIA GUAN MD

## 2019-05-10 LAB
CREAT SERPL-MCNC: 0.71 MG/DL (ref 0.52–1.04)
GFR SERPL CREATININE-BSD FRML MDRD: 77 ML/MIN/{1.73_M2}
PLATELET # BLD AUTO: 84 10E9/L (ref 150–450)

## 2019-05-10 PROCEDURE — 12000000 ZZH R&B MED SURG/OB

## 2019-05-10 PROCEDURE — 99232 SBSQ HOSP IP/OBS MODERATE 35: CPT | Performed by: INTERNAL MEDICINE

## 2019-05-10 PROCEDURE — 94640 AIRWAY INHALATION TREATMENT: CPT

## 2019-05-10 PROCEDURE — 25000132 ZZH RX MED GY IP 250 OP 250 PS 637: Performed by: INTERNAL MEDICINE

## 2019-05-10 PROCEDURE — 25000131 ZZH RX MED GY IP 250 OP 636 PS 637: Performed by: INTERNAL MEDICINE

## 2019-05-10 PROCEDURE — 94640 AIRWAY INHALATION TREATMENT: CPT | Mod: 76

## 2019-05-10 PROCEDURE — A9270 NON-COVERED ITEM OR SERVICE: HCPCS | Performed by: INTERNAL MEDICINE

## 2019-05-10 PROCEDURE — 85049 AUTOMATED PLATELET COUNT: CPT | Performed by: INTERNAL MEDICINE

## 2019-05-10 PROCEDURE — 82565 ASSAY OF CREATININE: CPT | Performed by: INTERNAL MEDICINE

## 2019-05-10 PROCEDURE — 36415 COLL VENOUS BLD VENIPUNCTURE: CPT | Performed by: INTERNAL MEDICINE

## 2019-05-10 PROCEDURE — 40000275 ZZH STATISTIC RCP TIME EA 10 MIN

## 2019-05-10 PROCEDURE — 25000125 ZZHC RX 250: Performed by: INTERNAL MEDICINE

## 2019-05-10 RX ORDER — FUROSEMIDE 20 MG
20 TABLET ORAL DAILY
Status: DISCONTINUED | OUTPATIENT
Start: 2019-05-10 | End: 2019-05-15 | Stop reason: HOSPADM

## 2019-05-10 RX ORDER — GABAPENTIN 300 MG/1
300 CAPSULE ORAL AT BEDTIME
Status: DISCONTINUED | OUTPATIENT
Start: 2019-05-10 | End: 2019-05-10

## 2019-05-10 RX ORDER — ACETAMINOPHEN 325 MG/1
650 TABLET ORAL AT BEDTIME
Status: DISCONTINUED | OUTPATIENT
Start: 2019-05-10 | End: 2019-05-15 | Stop reason: HOSPADM

## 2019-05-10 RX ADMIN — FERROUS SULFATE TAB 325 MG (65 MG ELEMENTAL FE) 325 MG: 325 (65 FE) TAB at 09:16

## 2019-05-10 RX ADMIN — TRAZODONE HYDROCHLORIDE 100 MG: 100 TABLET ORAL at 22:08

## 2019-05-10 RX ADMIN — GABAPENTIN 300 MG: 300 CAPSULE ORAL at 22:08

## 2019-05-10 RX ADMIN — SERTRALINE HYDROCHLORIDE 25 MG: 25 TABLET ORAL at 09:16

## 2019-05-10 RX ADMIN — TRAMADOL HYDROCHLORIDE 50 MG: 50 TABLET, COATED ORAL at 04:29

## 2019-05-10 RX ADMIN — PREDNISONE 40 MG: 20 TABLET ORAL at 09:15

## 2019-05-10 RX ADMIN — IPRATROPIUM BROMIDE AND ALBUTEROL SULFATE 3 ML: .5; 3 SOLUTION RESPIRATORY (INHALATION) at 15:47

## 2019-05-10 RX ADMIN — OYSTER SHELL CALCIUM WITH VITAMIN D 1 TABLET: 500; 200 TABLET, FILM COATED ORAL at 09:15

## 2019-05-10 RX ADMIN — IPRATROPIUM BROMIDE AND ALBUTEROL SULFATE 3 ML: .5; 3 SOLUTION RESPIRATORY (INHALATION) at 11:34

## 2019-05-10 RX ADMIN — ATORVASTATIN CALCIUM 20 MG: 20 TABLET, FILM COATED ORAL at 09:16

## 2019-05-10 RX ADMIN — IPRATROPIUM BROMIDE AND ALBUTEROL SULFATE 3 ML: .5; 3 SOLUTION RESPIRATORY (INHALATION) at 19:30

## 2019-05-10 RX ADMIN — ACETAMINOPHEN 650 MG: 325 TABLET, FILM COATED ORAL at 22:08

## 2019-05-10 RX ADMIN — FLUTICASONE FUROATE AND VILANTEROL TRIFENATATE 1 PUFF: 200; 25 POWDER RESPIRATORY (INHALATION) at 07:38

## 2019-05-10 RX ADMIN — METOPROLOL SUCCINATE 25 MG: 25 TABLET, EXTENDED RELEASE ORAL at 09:15

## 2019-05-10 RX ADMIN — AZITHROMYCIN 250 MG: 250 TABLET, FILM COATED ORAL at 09:15

## 2019-05-10 RX ADMIN — LEVOTHYROXINE SODIUM 75 MCG: 75 TABLET ORAL at 09:15

## 2019-05-10 RX ADMIN — IPRATROPIUM BROMIDE AND ALBUTEROL SULFATE 3 ML: .5; 3 SOLUTION RESPIRATORY (INHALATION) at 07:38

## 2019-05-10 RX ADMIN — MULTIPLE VITAMINS W/ MINERALS TAB 1 TABLET: TAB at 09:16

## 2019-05-10 RX ADMIN — FUROSEMIDE 20 MG: 20 TABLET ORAL at 09:28

## 2019-05-10 RX ADMIN — ASPIRIN 325 MG: 325 TABLET, DELAYED RELEASE ORAL at 20:23

## 2019-05-10 ASSESSMENT — ACTIVITIES OF DAILY LIVING (ADL)
ADLS_ACUITY_SCORE: 17

## 2019-05-10 NOTE — PROGRESS NOTES
[facility]  Hospitalist Progress Note  Anoop Zacarias MD, MD 05/10/2019  (Text Page)  Reason for Stay (Diagnosis): COPD in exacerbation         Assessment and Plan:      I have utilize excerpts of my colleagues prior progress note as I took service last May 9, 2019.    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 Acute hypoxic respiratory failure secondary to COPD exacerbation.  2. COPD with acute exacerbation suspect viral upper respiratory infection.  -Continue bronchodilators, nebulizers, steroids (Solu-Medrol 40 mg daily), changed to oral prednisone   -Monitor pulse ox, titrate oxygen down and wean her off  -Continue antibiotics, on oral Zithromax.  -Infiltrate on chest x-ray, may need to repeat chest x-ray in 4 to 6 weeks, if he does not improve consider CT scan in 4-6 weeks  -Slow taper of prednisone  3. Hyponatremia-resolved  4. Acute kidney injury on chronic kidney disease stage III.  -Likely due to dehydration and diuretics  -Improved with hydration  -Continue to hold Lasix.  -Creatinine down to 0.9 from 1.43  -Sodium also improved to 135  -I will resume her oral Lasix today as her creatinine remained stable, at 20 mg daily  -We will resume her home Lasix upon discharge    5. Anxiety-continue sertraline 25 mg and trazodone 100 mg at bedtime    6. CLL-she has history of CLL, WBC is always elevated, it was 124K on admission,  down to 73K..    7.  Thrombocytopenia-no bleeding tendencies.  Stop Lovenox.     DVT Prophylaxis:  Initiate mechanical PCD's as Lovenox being stopped today  Code Status: DNR / DNI  Discharge Dispo: Home   Estimated Disch Date / # of Days until Disch: 1-2 days if she continues to  "improve.            Interval History (Subjective):      Continuing service today.  Seen and examined.  Chart reviewed.  Case discussed with nursing service.  Her main complaint this morning is lack of sleep last night.  Her earlier symptoms of shortness of breath, coughing spells has been stable.  Tolerating oral diet with no reported nausea or vomiting.  Remain afebrile.  She is conversant, pleasant, interactive and no reported mental status changes.            Physical Exam:      Last Vital Signs:  /45 (BP Location: Right arm)   Pulse 77   Temp 98.7  F (37.1  C) (Oral)   Resp 18   Ht 1.499 m (4' 11.02\")   Wt 43.5 kg (96 lb)   SpO2 94%   BMI 19.38 kg/m      I/O last 3 completed shifts:  In: 750 [P.O.:750]  Out: -   Wt Readings from Last 1 Encounters:   05/07/19 43.5 kg (96 lb)     Vitals:    05/07/19 1630   Weight: 43.5 kg (96 lb)       Constitutional: Awake, alert, cooperative, no apparent distress   Respiratory:  Fair air entry, scattered wheezes bilaterally, no crackles   Cardiovascular: Regular rate and rhythm, normal S1 and S2, and no murmur noted   Abdomen: Normal bowel sounds, soft, non-distended, non-tender   Skin: No rashes, no cyanosis, dry to touch   Neuro: Alert and oriented x3, no weakness, spontaneous and coherent speech   Extremities: No edema, normal range of motion   Other(s): Euthymic mood, not agitated    Frail-appearing   All other systems: Negative          Medications:      All current medications were reviewed with changes reflected in problem list.         Data:      All new lab and imaging data was reviewed.   Labs:  Recent Labs   Lab 05/07/19  1329 05/07/19  1324   CULT No growth after 3 days No growth after 3 days     No results for input(s): PH, PHARTERIAL, PO2, [id number], SAT, PCO2, HCO3, BASEEXCESS, BEBO, BEB in the last 168 hours.    Invalid input(s): [id number]  Recent Labs   Lab 05/10/19  0715 05/08/19  0708 05/07/19  1324   WBC  --  73.5* 124.0*   HGB  --  9.9* 12.6 "   HCT  --  32.0* 41.8   MCV  --  91 93   PLT 84* 87* 109*     Recent Labs   Lab 05/10/19  0715 05/08/19  0708 05/07/19  1324   NA  --  135 132*   POTASSIUM  --  4.1 3.7   CHLORIDE  --  101 93*   CO2  --  32 34*   ANIONGAP  --  2* 5   GLC  --  150* 122*   BUN  --  22 29   CR 0.71 0.90 1.43*   GFRESTIMATED 77 57* 33*   GFRESTBLACK 89 67 38*   CARLOS  --  8.5 9.4     No results for input(s): SED, CRP in the last 168 hours.  Recent Labs   Lab 05/08/19  0708 05/07/19  1324   * 122*     No results for input(s): INR in the last 168 hours.  Recent Labs   Lab 05/07/19  1324   TROPI <0.015     No results for input(s): COLOR, APPEARANCE, URINEGLC, URINEBILI, URINEKETONE, SG, UBLD, URINEPH, PROTEIN, UROBILINOGEN, NITRITE, LEUKEST, RBCU, WBCU in the last 168 hours.   Imaging:   Recent Results (from the past 48 hour(s))   XR Chest 2 Views    Narrative    XR CHEST 2 VW 5/7/2019 2:14 PM    HISTORY: cough      Impression    IMPRESSION: Emphysematous changes. Possible nodular changes in the  lung apices bilaterally are prominent than previous exams. Consider CT  for further evaluation. Prominent thoracal lumbar scoliosis.    ROSALIA GUAN MD

## 2019-05-10 NOTE — PLAN OF CARE
A/Ox4, SBA for all transfers, VSS, Regular diet, PIV SL, on 2.5L O2, LS coarse with crackles to LLL, on IV steroids, nebs, and zithromax, reports pain with movement to L rib cage PRN  tramadol given, pt reports SOB with activity, infrequent non productive congested cough, [facility], continue with plan of care.

## 2019-05-10 NOTE — PLAN OF CARE
Diagnosis: COPD exacerbation     Vitals: VSS.   Orientation: A&O x 4.   Respiratory: On 3L O2 via NC. BENJAMIN. Productive cough green/yellow sputum.   Cards/Tele: WDL  GI/: WDL  Skin: scattered bruising noted.   Labs: WBC trending down 73.5. Plts 84.   Pain: C/O intermittent pain to left rib cage that is brought on with movement.     Diet: Regular with boost  Activity: A1 + walker      Plan for discharge: 1-2 days back to [facility]    Will continue to monitor.

## 2019-05-10 NOTE — PLAN OF CARE
A/O  Assist x1/walker to transfer.   Appetite poor.   BP soft at 97/50  68  Afebrile.   LS clear, diminished.

## 2019-05-11 PROCEDURE — 25000132 ZZH RX MED GY IP 250 OP 250 PS 637: Performed by: INTERNAL MEDICINE

## 2019-05-11 PROCEDURE — 94640 AIRWAY INHALATION TREATMENT: CPT | Mod: 76

## 2019-05-11 PROCEDURE — 40000275 ZZH STATISTIC RCP TIME EA 10 MIN

## 2019-05-11 PROCEDURE — 25000125 ZZHC RX 250: Performed by: INTERNAL MEDICINE

## 2019-05-11 PROCEDURE — A9270 NON-COVERED ITEM OR SERVICE: HCPCS | Performed by: INTERNAL MEDICINE

## 2019-05-11 PROCEDURE — 12000000 ZZH R&B MED SURG/OB

## 2019-05-11 PROCEDURE — 94640 AIRWAY INHALATION TREATMENT: CPT

## 2019-05-11 PROCEDURE — 99232 SBSQ HOSP IP/OBS MODERATE 35: CPT | Performed by: INTERNAL MEDICINE

## 2019-05-11 PROCEDURE — 25000131 ZZH RX MED GY IP 250 OP 636 PS 637: Performed by: INTERNAL MEDICINE

## 2019-05-11 RX ADMIN — MULTIPLE VITAMINS W/ MINERALS TAB 1 TABLET: TAB at 08:17

## 2019-05-11 RX ADMIN — SERTRALINE HYDROCHLORIDE 25 MG: 25 TABLET ORAL at 08:17

## 2019-05-11 RX ADMIN — ASPIRIN 325 MG: 325 TABLET, DELAYED RELEASE ORAL at 22:34

## 2019-05-11 RX ADMIN — ATORVASTATIN CALCIUM 20 MG: 20 TABLET, FILM COATED ORAL at 08:17

## 2019-05-11 RX ADMIN — AZITHROMYCIN 250 MG: 250 TABLET, FILM COATED ORAL at 08:17

## 2019-05-11 RX ADMIN — IPRATROPIUM BROMIDE AND ALBUTEROL SULFATE 3 ML: .5; 3 SOLUTION RESPIRATORY (INHALATION) at 19:53

## 2019-05-11 RX ADMIN — OYSTER SHELL CALCIUM WITH VITAMIN D 1 TABLET: 500; 200 TABLET, FILM COATED ORAL at 08:17

## 2019-05-11 RX ADMIN — METOPROLOL SUCCINATE 25 MG: 25 TABLET, EXTENDED RELEASE ORAL at 08:17

## 2019-05-11 RX ADMIN — IPRATROPIUM BROMIDE AND ALBUTEROL SULFATE 3 ML: .5; 3 SOLUTION RESPIRATORY (INHALATION) at 07:46

## 2019-05-11 RX ADMIN — PREDNISONE 40 MG: 20 TABLET ORAL at 08:17

## 2019-05-11 RX ADMIN — IPRATROPIUM BROMIDE AND ALBUTEROL SULFATE 3 ML: .5; 3 SOLUTION RESPIRATORY (INHALATION) at 15:42

## 2019-05-11 RX ADMIN — TRAZODONE HYDROCHLORIDE 100 MG: 100 TABLET ORAL at 22:34

## 2019-05-11 RX ADMIN — LEVOTHYROXINE SODIUM 75 MCG: 75 TABLET ORAL at 08:17

## 2019-05-11 RX ADMIN — FLUTICASONE FUROATE AND VILANTEROL TRIFENATATE 1 PUFF: 200; 25 POWDER RESPIRATORY (INHALATION) at 07:46

## 2019-05-11 RX ADMIN — IPRATROPIUM BROMIDE AND ALBUTEROL SULFATE 3 ML: .5; 3 SOLUTION RESPIRATORY (INHALATION) at 11:43

## 2019-05-11 RX ADMIN — FUROSEMIDE 20 MG: 20 TABLET ORAL at 08:17

## 2019-05-11 RX ADMIN — FERROUS SULFATE TAB 325 MG (65 MG ELEMENTAL FE) 325 MG: 325 (65 FE) TAB at 08:17

## 2019-05-11 RX ADMIN — GABAPENTIN 300 MG: 300 CAPSULE ORAL at 22:34

## 2019-05-11 ASSESSMENT — ACTIVITIES OF DAILY LIVING (ADL)
ADLS_ACUITY_SCORE: 17

## 2019-05-11 NOTE — PLAN OF CARE
Diagnosis: COPD exacerbation      Vitals: VSS.   Orientation: A&O x 4.   Respiratory: On 1L O2 via NC. BENJAMIN. Productive cough green/yellow sputum. Pt has PRN home O2. BENJAMIN noted.   Cards/Tele: WDL  GI/: WDL.  Skin: scattered bruising noted on bilateral LE. Lips are very scabbed and raw. Lip cream applied.   Pain: C/O intermittent pain to left rib cage, declines pain meds.      Diet: Regular with boost  Activity: A1 + walker        Plan for discharge: Back to [facility] home in a couple days with improvement.      Will continue to monitor

## 2019-05-11 NOTE — PROGRESS NOTES
[facility]  Hospitalist Progress Note  Anoop Zacarias MD, MD 05/11/2019  (Text Page)  Reason for Stay (Diagnosis): COPD in exacerbation         Assessment and Plan:      I have utilize excerpts of my colleagues prior progress note as I took service last May 9, 2019.    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 Acute hypoxic respiratory failure secondary to COPD exacerbation.  2. COPD with acute exacerbation suspect viral upper respiratory infection.  -Continue bronchodilators, nebulizers, steroids (Solu-Medrol 40 mg daily), changed to oral prednisone   -Monitor pulse ox, titrate oxygen down and wean her off  -Continue antibiotics, on oral Zithromax.  -Infiltrate on chest x-ray, may need to repeat chest x-ray in 4 to 6 weeks, if he does not improve consider CT scan in 4-6 weeks  -Slow taper of prednisone  3. Hyponatremia-resolved  4. Acute kidney injury on chronic kidney disease stage III.  -Likely due to dehydration and diuretics  -Improved with hydration  -Continue to hold Lasix.  -Creatinine down to 0.9 from 1.43  -Sodium also improved to 135  -I will resume her oral Lasix today as her creatinine remained stable, at 20 mg daily  -We will resume her home Lasix upon discharge    5. Anxiety-continue sertraline 25 mg and trazodone 100 mg at bedtime    6. CLL-she has history of CLL, WBC is always elevated, it was 124K on admission,  down to 73K..    7.  Thrombocytopenia-no bleeding tendencies.  Stop Lovenox.     DVT Prophylaxis:  Initiate mechanical PCD's as Lovenox stopped earlier due to thrombocytopenia  Code Status: DNR / DNI  Discharge Dispo: Home   Estimated Disch Date / # of Days until Disch:Pereira still not  "ready for discharge today.            Interval History (Subjective):      Continuing service today.  Seen and examined.  Chart reviewed.  Case discussed with nursing service.  Randall slept well overnight.  She is sleeping this morning but easily aroused.  Still saying about generalized weakness but no ongoing complaints of nausea, vomiting, shortness of breath or chest pain.              Physical Exam:      Last Vital Signs:  /59 (BP Location: Left arm)   Pulse 68   Temp 98.2  F (36.8  C) (Oral)   Resp 18   Ht 1.499 m (4' 11.02\")   Wt 43.5 kg (96 lb)   SpO2 92%   BMI 19.38 kg/m      I/O last 3 completed shifts:  In: 620 [P.O.:620]  Out: -   Wt Readings from Last 1 Encounters:   05/07/19 43.5 kg (96 lb)     Vitals:    05/07/19 1630   Weight: 43.5 kg (96 lb)       Constitutional: Awake, alert, cooperative, no apparent distress   Respiratory:  Fair air entry, scattered wheezes bilaterally, no crackles   Cardiovascular: Regular rate and rhythm, normal S1 and S2, and no murmur noted   Abdomen: Normal bowel sounds, soft, non-distended, non-tender   Skin: No rashes, no cyanosis, dry to touch   Neuro: Alert and oriented x3, no weakness, spontaneous and coherent speech   Extremities: No edema, normal range of motion   Other(s): Euthymic mood, not agitated    Frail-appearing   All other systems: Negative          Medications:      All current medications were reviewed with changes reflected in problem list.         Data:      All new lab and imaging data was reviewed.   Labs:  Recent Labs   Lab 05/07/19  1329 05/07/19  1324   CULT No growth after 4 days No growth after 4 days     No results for input(s): PH, PHARTERIAL, PO2, [id number], SAT, PCO2, HCO3, BASEEXCESS, BEBO, BEB in the last 168 hours.    Invalid input(s): [id number]  Recent Labs   Lab 05/10/19  0715 05/08/19  0708 05/07/19  1324   WBC  --  73.5* 124.0*   HGB  --  9.9* 12.6   HCT  --  32.0* 41.8   MCV  --  91 93   PLT 84* 87* 109*     Recent Labs   Lab " 05/10/19  0715 05/08/19  0708 05/07/19  1324   NA  --  135 132*   POTASSIUM  --  4.1 3.7   CHLORIDE  --  101 93*   CO2  --  32 34*   ANIONGAP  --  2* 5   GLC  --  150* 122*   BUN  --  22 29   CR 0.71 0.90 1.43*   GFRESTIMATED 77 57* 33*   GFRESTBLACK 89 67 38*   CARLOS  --  8.5 9.4     No results for input(s): SED, CRP in the last 168 hours.  Recent Labs   Lab 05/08/19  0708 05/07/19  1324   * 122*     No results for input(s): INR in the last 168 hours.  Recent Labs   Lab 05/07/19  1324   TROPI <0.015     No results for input(s): COLOR, APPEARANCE, URINEGLC, URINEBILI, URINEKETONE, SG, UBLD, URINEPH, PROTEIN, UROBILINOGEN, NITRITE, LEUKEST, RBCU, WBCU in the last 168 hours.   Imaging:   Recent Results (from the past 48 hour(s))   XR Chest 2 Views    Narrative    XR CHEST 2 VW 5/7/2019 2:14 PM    HISTORY: cough      Impression    IMPRESSION: Emphysematous changes. Possible nodular changes in the  lung apices bilaterally are prominent than previous exams. Consider CT  for further evaluation. Prominent thoracal lumbar scoliosis.    ROSALIA GUAN MD

## 2019-05-11 NOTE — PROGRESS NOTES
SPIRITUAL HEALTH SERVICES  Progress Note  FR  [id number]    Visited with pt per [facility] consult.  Marie shared many stories about her children and grandchildren.  These stories started when she was lamenting that she couldn't go to her Mother's Day brun at [facility].    Marie has a strong reinier and said it helped her through many illnesses and the death of two husbands.  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  We shared a prayer before I left.  She would appreciate a  visit each day that she is here.  I will leave a note for the Sunday on call .    Jaida Downing    Pager [phone]

## 2019-05-11 NOTE — PLAN OF CARE
VSS. Pt on 1.5 L of O2 throughout the night. Able to take pt off O2 this morning and pt sating well. Pt denied pain. A&Ox4 but forgetful. On oral zithro and prednisone. C/o of SOB with exertion but non at rest. Pt slept well throughout the night. Plan to discharge in 1-2 days to prior living arrangement. Continue to monitor per POC.

## 2019-05-11 NOTE — PLAN OF CARE
DX :  COPD exacerbation     HX : HTN, bladder cancer,hypothyroidism, Osteonecrosis of the jaw, anxiety.  LABS : WBC 73.5,Platelet 84.  TELE:   Not on tele  GI/ : Continent of B&B  DIET : Regular diet.  ASSESS;A&OX4. Forgetful. Oxygen at 3L per nc. Very anxious with cares.  PAIN : Denied pain during this shift.  ACTIVITY :  Assist of 1 with walker and gait belt for transferring.  TEACHING : Educated to use call light for help and to sit up while eating.  PLAN FOR DISCHARGE : gave trazadone for sleep and anxiety. Discharge in 1-2 days to senior living when stable.

## 2019-05-12 PROCEDURE — 25000125 ZZHC RX 250: Performed by: INTERNAL MEDICINE

## 2019-05-12 PROCEDURE — 94640 AIRWAY INHALATION TREATMENT: CPT | Mod: 76

## 2019-05-12 PROCEDURE — 12000000 ZZH R&B MED SURG/OB

## 2019-05-12 PROCEDURE — 25000131 ZZH RX MED GY IP 250 OP 636 PS 637: Performed by: INTERNAL MEDICINE

## 2019-05-12 PROCEDURE — 25000132 ZZH RX MED GY IP 250 OP 250 PS 637: Performed by: INTERNAL MEDICINE

## 2019-05-12 PROCEDURE — 94640 AIRWAY INHALATION TREATMENT: CPT

## 2019-05-12 PROCEDURE — 40000274 ZZH STATISTIC RCP CONSULT EA 30 MIN

## 2019-05-12 PROCEDURE — 99232 SBSQ HOSP IP/OBS MODERATE 35: CPT | Performed by: INTERNAL MEDICINE

## 2019-05-12 PROCEDURE — A9270 NON-COVERED ITEM OR SERVICE: HCPCS | Performed by: INTERNAL MEDICINE

## 2019-05-12 PROCEDURE — 40000275 ZZH STATISTIC RCP TIME EA 10 MIN

## 2019-05-12 RX ADMIN — OYSTER SHELL CALCIUM WITH VITAMIN D 1 TABLET: 500; 200 TABLET, FILM COATED ORAL at 08:39

## 2019-05-12 RX ADMIN — GABAPENTIN 300 MG: 300 CAPSULE ORAL at 22:19

## 2019-05-12 RX ADMIN — TRAZODONE HYDROCHLORIDE 100 MG: 100 TABLET ORAL at 22:18

## 2019-05-12 RX ADMIN — TOPICAL ANESTHETIC 1 ML: 200 SPRAY DENTAL; PERIODONTAL at 11:36

## 2019-05-12 RX ADMIN — MULTIPLE VITAMINS W/ MINERALS TAB 1 TABLET: TAB at 08:40

## 2019-05-12 RX ADMIN — ATORVASTATIN CALCIUM 20 MG: 20 TABLET, FILM COATED ORAL at 08:40

## 2019-05-12 RX ADMIN — IPRATROPIUM BROMIDE AND ALBUTEROL SULFATE 3 ML: .5; 3 SOLUTION RESPIRATORY (INHALATION) at 19:32

## 2019-05-12 RX ADMIN — TRAMADOL HYDROCHLORIDE 50 MG: 50 TABLET, COATED ORAL at 06:03

## 2019-05-12 RX ADMIN — PREDNISONE 40 MG: 20 TABLET ORAL at 08:40

## 2019-05-12 RX ADMIN — ACETAMINOPHEN 650 MG: 325 TABLET, FILM COATED ORAL at 22:19

## 2019-05-12 RX ADMIN — SERTRALINE HYDROCHLORIDE 25 MG: 25 TABLET ORAL at 08:40

## 2019-05-12 RX ADMIN — FLUTICASONE FUROATE AND VILANTEROL TRIFENATATE 1 PUFF: 200; 25 POWDER RESPIRATORY (INHALATION) at 07:34

## 2019-05-12 RX ADMIN — LEVOTHYROXINE SODIUM 75 MCG: 75 TABLET ORAL at 08:40

## 2019-05-12 RX ADMIN — TRAMADOL HYDROCHLORIDE 50 MG: 50 TABLET, COATED ORAL at 16:18

## 2019-05-12 RX ADMIN — FERROUS SULFATE TAB 325 MG (65 MG ELEMENTAL FE) 325 MG: 325 (65 FE) TAB at 08:40

## 2019-05-12 RX ADMIN — METOPROLOL SUCCINATE 25 MG: 25 TABLET, EXTENDED RELEASE ORAL at 08:39

## 2019-05-12 RX ADMIN — FUROSEMIDE 20 MG: 20 TABLET ORAL at 08:40

## 2019-05-12 RX ADMIN — ASPIRIN 325 MG: 325 TABLET, DELAYED RELEASE ORAL at 22:18

## 2019-05-12 RX ADMIN — IPRATROPIUM BROMIDE AND ALBUTEROL SULFATE 3 ML: .5; 3 SOLUTION RESPIRATORY (INHALATION) at 07:34

## 2019-05-12 RX ADMIN — TOPICAL ANESTHETIC 0.5 ML: 200 SPRAY DENTAL; PERIODONTAL at 14:41

## 2019-05-12 RX ADMIN — TOPICAL ANESTHETIC 0.5 ML: 200 SPRAY DENTAL; PERIODONTAL at 20:44

## 2019-05-12 RX ADMIN — IPRATROPIUM BROMIDE AND ALBUTEROL SULFATE 3 ML: .5; 3 SOLUTION RESPIRATORY (INHALATION) at 11:55

## 2019-05-12 RX ADMIN — IPRATROPIUM BROMIDE AND ALBUTEROL SULFATE 3 ML: .5; 3 SOLUTION RESPIRATORY (INHALATION) at 15:50

## 2019-05-12 ASSESSMENT — ACTIVITIES OF DAILY LIVING (ADL)
ADLS_ACUITY_SCORE: 17

## 2019-05-12 NOTE — PLAN OF CARE
VSS, pt on 1 L of O2 throughout the night. O2 spot checks in mid 90s. Pt slept very well last night but woke up this morning with increased pain in lips. Lips looked more swollen this morning and still excoriated, red, and dry. Prn tramadol, ice, and aquaphor given with some relief. Continue to monitor per POC.

## 2019-05-12 NOTE — PLAN OF CARE
Pt AOx4. Anxious at times. Up SBA w/ walker. 1L via n/c. Chapped/excoriated bottom lip - Aquaphor applied. Nontele. IV to left arm saline locked. Continue to strengthen.  visited today. Plan for home and dishcarge 1-2 days.

## 2019-05-12 NOTE — PROGRESS NOTES
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 Acute hypoxic respiratory failure secondary to COPD exacerbation.  2. COPD with acute exacerbation suspect viral upper respiratory infection.  -Continue bronchodilators, nebulizers, steroids, changed to oral prednisone   -Monitor pulse ox, titrate oxygen down and wean her off  -Continue antibiotics, on oral Zithromax.  -Infiltrate on chest x-ray, may need to repeat chest x-ray in 4 to 6 weeks, if he does not improve consider CT scan in 4-6 weeks  -Slow taper of prednisone  3. Hyponatremia-resolved  -Repeat BMP in a.m. given resumption of her home Lasix  4. Acute kidney injury on chronic kidney disease stage III.  -Likely due to dehydration and diuretics  -Improved with hydration  -Continue to hold Lasix.  -Creatinine down to 0.9 from 1.43  -Sodium also improved to 135  -I will resume her oral Lasix today as her creatinine remained stable, at 20 mg daily  -We will resume her home Lasix upon discharge    5. Anxiety-continue sertraline 25 mg and trazodone 100 mg at bedtime    6. CLL-she has history of CLL, WBC is always elevated, it was 124K on admission,  down to 73K..    7.  Thrombocytopenia-no bleeding tendencies.  Stop Lovenox.    8.  Numerous oral sores-apply benzocaine, please administer humidified oxygen     DVT Prophylaxis:  Initiate mechanical PCD's as Lovenox stopped earlier due to thrombocytopenia  Code Status: DNR / DNI  Discharge Dispo: Home   Estimated Disch Date / # of Days until Disch:  "Randall still having intermittent anxiety spells as reported by nursing staff.  There will be occurrences of oxygen desaturation during this event.  We will continue current inpatient care for now.  Oxygen supplementation as needed.            Interval History (Subjective):      Continuing service today.  Seen and examined.  Chart reviewed.  Case discussed with nursing service.  Randall slept well overnight.  She is sleeping this morning but easily aroused.  Still saying about generalized weakness but no ongoing complaints of nausea, vomiting, shortness of breath or chest pain.              Physical Exam:      Last Vital Signs:  /50 (BP Location: Left arm)   Pulse 94   Temp 98.3  F (36.8  C) (Oral)   Resp 22   Ht 1.499 m (4' 11.02\")   Wt 43.5 kg (96 lb)   SpO2 99%   BMI 19.38 kg/m      No intake/output data recorded.  Wt Readings from Last 1 Encounters:   05/07/19 43.5 kg (96 lb)     Vitals:    05/07/19 1630   Weight: 43.5 kg (96 lb)       Constitutional: Awake, alert, cooperative, no apparent distress   Respiratory:  Fair air entry, scattered wheezes bilaterally, no crackles   Cardiovascular: Regular rate and rhythm, normal S1 and S2, and no murmur noted   Abdomen: Normal bowel sounds, soft, non-distended, non-tender   Skin: No rashes, no cyanosis, dry to touch   Neuro: Alert and oriented x3, no weakness, spontaneous and coherent speech   Extremities: No edema, normal range of motion   Other(s): Euthymic mood, not agitated    Frail-appearing   All other systems: Negative          Medications:      All current medications were reviewed with changes reflected in problem list.         Data:      All new lab and imaging data was reviewed.   Labs:  Recent Labs   Lab 05/07/19  1329 05/07/19  1324   CULT No growth after 5 days No growth after 5 days     No results for input(s): PH, PHARTERIAL, PO2, [id number], SAT, PCO2, HCO3, BASEEXCESS, BEBO, BEB in the last 168 hours.    Invalid input(s): [id number]  Recent Labs "   Lab 05/10/19  0715 05/08/19  0708 05/07/19  1324   WBC  --  73.5* 124.0*   HGB  --  9.9* 12.6   HCT  --  32.0* 41.8   MCV  --  91 93   PLT 84* 87* 109*     Recent Labs   Lab 05/10/19  0715 05/08/19  0708 05/07/19  1324   NA  --  135 132*   POTASSIUM  --  4.1 3.7   CHLORIDE  --  101 93*   CO2  --  32 34*   ANIONGAP  --  2* 5   GLC  --  150* 122*   BUN  --  22 29   CR 0.71 0.90 1.43*   GFRESTIMATED 77 57* 33*   GFRESTBLACK 89 67 38*   CARLOS  --  8.5 9.4     No results for input(s): SED, CRP in the last 168 hours.  Recent Labs   Lab 05/08/19  0708 05/07/19  1324   * 122*     No results for input(s): INR in the last 168 hours.  Recent Labs   Lab 05/07/19  1324   TROPI <0.015     No results for input(s): COLOR, APPEARANCE, URINEGLC, URINEBILI, URINEKETONE, SG, UBLD, URINEPH, PROTEIN, UROBILINOGEN, NITRITE, LEUKEST, RBCU, WBCU in the last 168 hours.   Imaging:   Recent Results (from the past 48 hour(s))   XR Chest 2 Views    Narrative    XR CHEST 2 VW 5/7/2019 2:14 PM    HISTORY: cough      Impression    IMPRESSION: Emphysematous changes. Possible nodular changes in the  lung apices bilaterally are prominent than previous exams. Consider CT  for further evaluation. Prominent thoracal lumbar scoliosis.    ROSALIA GUAN MD

## 2019-05-12 NOTE — PLAN OF CARE
Diagnosis: COPD exacerbation      Vitals: VSS.   Orientation: A&O x 4.   Respiratory: Wears intermittently 1L O2 via [place]. Destats during anxiety spells to upper 80's% . BENJAMIN. Productive cough green/yellow sputum. Pt has PRN home O2.  Cards/Tele: WDL  GI/: WDL.  Skin: Scattered bruising noted on bilateral LE. Lips are very scabbed and raw, new cream order by MD, ice applied and Aquaphor.   Pain: C/O intermittent pain to left rib cage, requesting tramadol for pain. Received spray for mouth x2 this shift.      Diet: Regular with boost  Activity: A1 + walker        Plan for discharge: Back to Senior Living home in a couple days with improvement.      Will continue to monitor

## 2019-05-12 NOTE — PROGRESS NOTES
"[facility]     Date: 5/12/19    Admission Dx: COPD Exacerbation    Pulmonary History: COPD    Home Nebulizer/MDI Use: Dulera BID and Albuterol MDI Q6prn    Home Oxygen: 1-2L O2 as needed    Acuity Level (RCAT flow sheet): Level 3    Aerosol Therapy initiated: continue Duoneb QID and Albuterol Q2prn    Pulmonary Hygiene initiated: Continue deep breathing and good cough techniques.    Volume Expansion initiated: continue IS    Current Oxygen Requirements: 1L as needed     Current SpO2: mid 90's    Re-evaluation date: 5/15/19    Patient Education: discuss with patient the indications, benefit and side effect of nebulizer.      See \"RT Assessments\" flow sheet for patient assessment scoring and Acuity Level Details.             "

## 2019-05-13 LAB
ANION GAP SERPL CALCULATED.3IONS-SCNC: 1 MMOL/L (ref 3–14)
BACTERIA SPEC CULT: NO GROWTH
BACTERIA SPEC CULT: NO GROWTH
BUN SERPL-MCNC: 27 MG/DL (ref 7–30)
CALCIUM SERPL-MCNC: 8.8 MG/DL (ref 8.5–10.1)
CHLORIDE SERPL-SCNC: 97 MMOL/L (ref 94–109)
CO2 SERPL-SCNC: 37 MMOL/L (ref 20–32)
CREAT SERPL-MCNC: 0.76 MG/DL (ref 0.52–1.04)
GFR SERPL CREATININE-BSD FRML MDRD: 71 ML/MIN/{1.73_M2}
GLUCOSE SERPL-MCNC: 82 MG/DL (ref 70–99)
Lab: NORMAL
Lab: NORMAL
PLATELET # BLD AUTO: 211 10E9/L (ref 150–450)
POTASSIUM SERPL-SCNC: 5.2 MMOL/L (ref 3.4–5.3)
SODIUM SERPL-SCNC: 135 MMOL/L (ref 133–144)
SPECIMEN SOURCE: NORMAL
SPECIMEN SOURCE: NORMAL

## 2019-05-13 PROCEDURE — 80048 BASIC METABOLIC PNL TOTAL CA: CPT | Performed by: INTERNAL MEDICINE

## 2019-05-13 PROCEDURE — 40000275 ZZH STATISTIC RCP TIME EA 10 MIN

## 2019-05-13 PROCEDURE — A9270 NON-COVERED ITEM OR SERVICE: HCPCS | Performed by: INTERNAL MEDICINE

## 2019-05-13 PROCEDURE — 85049 AUTOMATED PLATELET COUNT: CPT | Performed by: INTERNAL MEDICINE

## 2019-05-13 PROCEDURE — 25000132 ZZH RX MED GY IP 250 OP 250 PS 637: Performed by: INTERNAL MEDICINE

## 2019-05-13 PROCEDURE — 25000125 ZZHC RX 250: Performed by: INTERNAL MEDICINE

## 2019-05-13 PROCEDURE — 94640 AIRWAY INHALATION TREATMENT: CPT

## 2019-05-13 PROCEDURE — 25000131 ZZH RX MED GY IP 250 OP 636 PS 637: Performed by: INTERNAL MEDICINE

## 2019-05-13 PROCEDURE — 36415 COLL VENOUS BLD VENIPUNCTURE: CPT | Performed by: INTERNAL MEDICINE

## 2019-05-13 PROCEDURE — 12000000 ZZH R&B MED SURG/OB

## 2019-05-13 PROCEDURE — 40000809 ZZH STATISTIC NO DOCUMENTATION TO SUPPORT CHARGE

## 2019-05-13 PROCEDURE — 99232 SBSQ HOSP IP/OBS MODERATE 35: CPT | Performed by: INTERNAL MEDICINE

## 2019-05-13 PROCEDURE — 94640 AIRWAY INHALATION TREATMENT: CPT | Mod: 76

## 2019-05-13 RX ORDER — VALACYCLOVIR HYDROCHLORIDE 1 G/1
1000 TABLET, FILM COATED ORAL EVERY 12 HOURS SCHEDULED
Status: DISCONTINUED | OUTPATIENT
Start: 2019-05-13 | End: 2019-05-15 | Stop reason: HOSPADM

## 2019-05-13 RX ORDER — ACYCLOVIR 50 MG/G
OINTMENT TOPICAL
Status: DISCONTINUED | OUTPATIENT
Start: 2019-05-13 | End: 2019-05-15 | Stop reason: HOSPADM

## 2019-05-13 RX ADMIN — ACETAMINOPHEN 650 MG: 325 TABLET, FILM COATED ORAL at 22:08

## 2019-05-13 RX ADMIN — GABAPENTIN 300 MG: 300 CAPSULE ORAL at 22:08

## 2019-05-13 RX ADMIN — ACYCLOVIR: 50 OINTMENT TOPICAL at 19:28

## 2019-05-13 RX ADMIN — ACETAMINOPHEN 650 MG: 325 TABLET, FILM COATED ORAL at 16:31

## 2019-05-13 RX ADMIN — ACYCLOVIR: 50 OINTMENT TOPICAL at 16:25

## 2019-05-13 RX ADMIN — LEVOTHYROXINE SODIUM 75 MCG: 75 TABLET ORAL at 08:47

## 2019-05-13 RX ADMIN — OYSTER SHELL CALCIUM WITH VITAMIN D 1 TABLET: 500; 200 TABLET, FILM COATED ORAL at 08:46

## 2019-05-13 RX ADMIN — IPRATROPIUM BROMIDE AND ALBUTEROL SULFATE 3 ML: .5; 3 SOLUTION RESPIRATORY (INHALATION) at 07:35

## 2019-05-13 RX ADMIN — TRAZODONE HYDROCHLORIDE 100 MG: 100 TABLET ORAL at 22:08

## 2019-05-13 RX ADMIN — MULTIPLE VITAMINS W/ MINERALS TAB 1 TABLET: TAB at 08:47

## 2019-05-13 RX ADMIN — SERTRALINE HYDROCHLORIDE 25 MG: 25 TABLET ORAL at 08:47

## 2019-05-13 RX ADMIN — IPRATROPIUM BROMIDE AND ALBUTEROL SULFATE 3 ML: .5; 3 SOLUTION RESPIRATORY (INHALATION) at 15:36

## 2019-05-13 RX ADMIN — VALACYCLOVIR HYDROCHLORIDE 1000 MG: 1 TABLET, FILM COATED ORAL at 13:05

## 2019-05-13 RX ADMIN — IPRATROPIUM BROMIDE AND ALBUTEROL SULFATE 3 ML: .5; 3 SOLUTION RESPIRATORY (INHALATION) at 19:39

## 2019-05-13 RX ADMIN — ACYCLOVIR: 50 OINTMENT TOPICAL at 21:35

## 2019-05-13 RX ADMIN — ATORVASTATIN CALCIUM 20 MG: 20 TABLET, FILM COATED ORAL at 08:47

## 2019-05-13 RX ADMIN — TOPICAL ANESTHETIC 0.5 ML: 200 SPRAY DENTAL; PERIODONTAL at 14:00

## 2019-05-13 RX ADMIN — VALACYCLOVIR HYDROCHLORIDE 1000 MG: 1 TABLET, FILM COATED ORAL at 19:28

## 2019-05-13 RX ADMIN — ACYCLOVIR: 50 OINTMENT TOPICAL at 13:05

## 2019-05-13 RX ADMIN — SENNOSIDES AND DOCUSATE SODIUM 2 TABLET: 8.6; 5 TABLET ORAL at 13:49

## 2019-05-13 RX ADMIN — IPRATROPIUM BROMIDE AND ALBUTEROL SULFATE 3 ML: .5; 3 SOLUTION RESPIRATORY (INHALATION) at 11:55

## 2019-05-13 RX ADMIN — ASPIRIN 325 MG: 325 TABLET, DELAYED RELEASE ORAL at 19:28

## 2019-05-13 RX ADMIN — FUROSEMIDE 20 MG: 20 TABLET ORAL at 08:47

## 2019-05-13 RX ADMIN — FERROUS SULFATE TAB 325 MG (65 MG ELEMENTAL FE) 325 MG: 325 (65 FE) TAB at 08:47

## 2019-05-13 RX ADMIN — TOPICAL ANESTHETIC 0.5 ML: 200 SPRAY DENTAL; PERIODONTAL at 08:46

## 2019-05-13 RX ADMIN — FLUTICASONE FUROATE AND VILANTEROL TRIFENATATE 1 PUFF: 200; 25 POWDER RESPIRATORY (INHALATION) at 07:35

## 2019-05-13 RX ADMIN — TRAMADOL HYDROCHLORIDE 50 MG: 50 TABLET, COATED ORAL at 14:04

## 2019-05-13 RX ADMIN — PREDNISONE 40 MG: 20 TABLET ORAL at 08:47

## 2019-05-13 RX ADMIN — METOPROLOL SUCCINATE 25 MG: 25 TABLET, EXTENDED RELEASE ORAL at 08:47

## 2019-05-13 RX ADMIN — TOPICAL ANESTHETIC 0.5 ML: 200 SPRAY DENTAL; PERIODONTAL at 04:45

## 2019-05-13 RX ADMIN — TOPICAL ANESTHETIC 0.5 ML: 200 SPRAY DENTAL; PERIODONTAL at 00:29

## 2019-05-13 ASSESSMENT — ACTIVITIES OF DAILY LIVING (ADL)
ADLS_ACUITY_SCORE: 17

## 2019-05-13 NOTE — PROGRESS NOTES
"SPIRITUAL HEALTH SERVICES Progress Note  [facility]  Med. Surg. 3    Saw pt Marie per her request for further  support.  This author is familiar with pt from previous admissions.  Provided reflective conversation to facilitate storytelling and the processing of thoughts and feelings; offered validation of feelings, affirmation, and prayer.  Marie reported that she came in with \"respiratory problems\" but then developed painful mouth sores.  She witnessed to her reinier and repeated several times \"God is good.\"  Marie named her daughter and son-in-law as being central to her support system as well as friends and neighbors at the [facility] where she lives.  She described her son-in-law as \"an mukul\" and related that he is driving back and forth between her and her daughter, who just had knee surgery.  Marie also has two sons: one lives in [place] with his family and the other in [place].  She shared stories about her father's recovery from alcoholism and about her two husbands, both of whom  of cancer.  Marie welcomed prayer.    This author and other chaplains remain available per pt's request.    Pelon Zamarripa M.Div., [facility]  Staff   Pager [phone]    "

## 2019-05-13 NOTE — PLAN OF CARE
VSS on 1L O2 humidified. SBA with walker. A/O, anxious. Lips very painful, Benzocaine applied x2, Tramadol, Valtrex and Acyclovir ordered and given, see MAR. Pt c/o constipation, Senna given see MAR. Plan to discharge tomorrow.

## 2019-05-13 NOTE — PLAN OF CARE
VSS, pt c/o pain d/t cracked, dry, red lips. Prn numbing spray given x2 with some relief. Pt using small ice pack, ice chips, and aquaphor for more comfort. O2 spot check 95% or higher throughout the night on room air. Last spot check pt sating 88%. Now on 1L O2 via nasal cannula with humidification. LS diminished. No edema. Pt otherwise slept well throughout the night with little anxiety. Continue to monitor per POC.

## 2019-05-13 NOTE — PLAN OF CARE
Pt AOx4. Anxious at times. Up SBA w/ walker. RA. Chapped/excoriated bottom lip - prn lidocaine spray used and Aquaphor applied. Nontele. IV to left arm saline locked. Continue to strengthen.  visited today. Plan for discharge home to independent living in 1-2 days.

## 2019-05-13 NOTE — PROGRESS NOTES
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  However, her course has been complicated by significant rash and chapping of her lips and some small ulcerations of her oral mucosa.  This is been treated with Aquaphor, Vaseline, among other medications without relief.  I suspect she likely has a viral infection as a cause.  Will start oral Valtrex today and apply topical acyclovir to treat this     Problem List:   1. Acute hypoxic respiratory failure secondary to COPD exacerbation.  Improved.  Breathing now near baseline.  Has used intermittent oxygen in the past.    2. COPD with acute exacerbation suspect viral upper respiratory infection.  -Continue bronchodilators, nebulizers  -Has been on prednisone for several days.  I think we can stop this medication.  Exacerbation appears resolved.  -Wean off oxygen.  She may require low-dose oxygen and discharge from the hospital  -Completed course of azithromycin.  -Infiltrate on chest x-ray, may need to repeat chest x-ray in 4 to 6 weeks, if he does not improve consider CT scan in 4-6 weeks    3. Hyponatremia-resolved    4. Acute kidney injury on chronic kidney disease stage III.  -Likely due to dehydration and diuretics  -Improved with hydration.  Lasix has been resumed.       5. Anxiety-continue sertraline 25 mg and trazodone 100 mg at bedtime     6. CLL-she has history of CLL, WBC is always elevated, it was 124K on admission,  down to  "73K.. Is receiving therapy through Dr. Ivory of oncology     7.  Thrombocytopenia-no bleeding tendencies.  resolved     8.  Numerous oral sores - suspect viral infection, likely herpes/cold sores.  Will treat with valacyclovir orally and topical acyclovir.     DVT Prophylaxis: scd  Code Status: DNR / DNI  Discharge Dispo: Home   Estimated Disch Date / # of Days until Disch:  Anticipate discharge in the next 1 to 2 days.  Hoping for tomorrow.               Interval History (Subjective):      Breathing improved and at baseline.  Her primary issue is significant crusting and pain of both of her lips.  She also has some small oral ulcers                  Physical Exam:      Last Vital Signs:  /68 (BP Location: Right arm)   Pulse 78   Temp 97.6  F (36.4  C) (Oral)   Resp 18   Ht 1.499 m (4' 11.02\")   Wt 43.5 kg (96 lb)   SpO2 92%   BMI 19.38 kg/m        Intake/Output Summary (Last 24 hours) at 5/13/2019 1356  Last data filed at 5/13/2019 1318  Gross per 24 hour   Intake 1240 ml   Output --   Net 1240 ml       Constitutional: Awake, alert, cooperative, no apparent distress   Respiratory: Clear to auscultation bilaterally, no crackles or wheezing   Cardiovascular: Regular rate and rhythm, normal S1 and S2, and no murmur noted   Abdomen: Normal bowel sounds, soft, non-distended, non-tender   Skin: No rashes, no cyanosis, dry to touch   Neuro: Alert and oriented x3, no weakness, numbness, memory loss   Extremities: No edema, normal range of motion   Other(s):  Extensive crusting and ulceration of both of her lips.  She also has some small ulcerations of her oropharynx.       All other systems: Negative          Medications:      All current medications were reviewed with changes reflected in problem list.         Data:      All new lab and imaging data was reviewed.   Labs:  Recent Labs   Lab 05/13/19  0644  05/08/19  0708   WBC  --   --  73.5*   HGB  --   --  9.9*   HCT  --   --  32.0*   MCV  --   --  91   PLT " 211   < > 87*    < > = values in this interval not displayed.      Imaging:   No results found for this or any previous visit (from the past 24 hour(s)).

## 2019-05-14 PROCEDURE — 25000125 ZZHC RX 250: Performed by: INTERNAL MEDICINE

## 2019-05-14 PROCEDURE — 12000000 ZZH R&B MED SURG/OB

## 2019-05-14 PROCEDURE — A9270 NON-COVERED ITEM OR SERVICE: HCPCS | Performed by: INTERNAL MEDICINE

## 2019-05-14 PROCEDURE — 99232 SBSQ HOSP IP/OBS MODERATE 35: CPT | Performed by: INTERNAL MEDICINE

## 2019-05-14 PROCEDURE — 25000132 ZZH RX MED GY IP 250 OP 250 PS 637: Performed by: INTERNAL MEDICINE

## 2019-05-14 PROCEDURE — 94640 AIRWAY INHALATION TREATMENT: CPT

## 2019-05-14 PROCEDURE — 40000275 ZZH STATISTIC RCP TIME EA 10 MIN

## 2019-05-14 PROCEDURE — 94640 AIRWAY INHALATION TREATMENT: CPT | Mod: 76

## 2019-05-14 RX ADMIN — ACYCLOVIR: 50 OINTMENT TOPICAL at 15:42

## 2019-05-14 RX ADMIN — FERROUS SULFATE TAB 325 MG (65 MG ELEMENTAL FE) 325 MG: 325 (65 FE) TAB at 08:29

## 2019-05-14 RX ADMIN — FLUTICASONE FUROATE AND VILANTEROL TRIFENATATE 1 PUFF: 200; 25 POWDER RESPIRATORY (INHALATION) at 07:37

## 2019-05-14 RX ADMIN — ACETAMINOPHEN 650 MG: 325 TABLET, FILM COATED ORAL at 18:22

## 2019-05-14 RX ADMIN — ACYCLOVIR: 50 OINTMENT TOPICAL at 18:23

## 2019-05-14 RX ADMIN — TRAMADOL HYDROCHLORIDE 50 MG: 50 TABLET, COATED ORAL at 15:42

## 2019-05-14 RX ADMIN — IPRATROPIUM BROMIDE AND ALBUTEROL SULFATE 3 ML: .5; 3 SOLUTION RESPIRATORY (INHALATION) at 11:40

## 2019-05-14 RX ADMIN — VALACYCLOVIR HYDROCHLORIDE 1000 MG: 1 TABLET, FILM COATED ORAL at 19:55

## 2019-05-14 RX ADMIN — MULTIPLE VITAMINS W/ MINERALS TAB 1 TABLET: TAB at 08:29

## 2019-05-14 RX ADMIN — IPRATROPIUM BROMIDE AND ALBUTEROL SULFATE 3 ML: .5; 3 SOLUTION RESPIRATORY (INHALATION) at 15:10

## 2019-05-14 RX ADMIN — METOPROLOL SUCCINATE 25 MG: 25 TABLET, EXTENDED RELEASE ORAL at 08:29

## 2019-05-14 RX ADMIN — SERTRALINE HYDROCHLORIDE 25 MG: 25 TABLET ORAL at 08:29

## 2019-05-14 RX ADMIN — GABAPENTIN 300 MG: 300 CAPSULE ORAL at 21:56

## 2019-05-14 RX ADMIN — ACYCLOVIR: 50 OINTMENT TOPICAL at 06:26

## 2019-05-14 RX ADMIN — Medication 1 MG: at 21:56

## 2019-05-14 RX ADMIN — SENNOSIDES AND DOCUSATE SODIUM 2 TABLET: 8.6; 5 TABLET ORAL at 15:42

## 2019-05-14 RX ADMIN — ACYCLOVIR: 50 OINTMENT TOPICAL at 03:33

## 2019-05-14 RX ADMIN — LEVOTHYROXINE SODIUM 75 MCG: 75 TABLET ORAL at 08:29

## 2019-05-14 RX ADMIN — TRAMADOL HYDROCHLORIDE 50 MG: 50 TABLET, COATED ORAL at 00:20

## 2019-05-14 RX ADMIN — ACETAMINOPHEN 650 MG: 325 TABLET, FILM COATED ORAL at 21:56

## 2019-05-14 RX ADMIN — FUROSEMIDE 20 MG: 20 TABLET ORAL at 08:29

## 2019-05-14 RX ADMIN — IPRATROPIUM BROMIDE AND ALBUTEROL SULFATE 3 ML: .5; 3 SOLUTION RESPIRATORY (INHALATION) at 07:32

## 2019-05-14 RX ADMIN — VALACYCLOVIR HYDROCHLORIDE 1000 MG: 1 TABLET, FILM COATED ORAL at 08:29

## 2019-05-14 RX ADMIN — ACYCLOVIR: 50 OINTMENT TOPICAL at 09:07

## 2019-05-14 RX ADMIN — ASPIRIN 325 MG: 325 TABLET, DELAYED RELEASE ORAL at 19:55

## 2019-05-14 RX ADMIN — ACYCLOVIR: 50 OINTMENT TOPICAL at 21:06

## 2019-05-14 RX ADMIN — ACYCLOVIR: 50 OINTMENT TOPICAL at 11:53

## 2019-05-14 RX ADMIN — ACYCLOVIR: 50 OINTMENT TOPICAL at 00:06

## 2019-05-14 RX ADMIN — ATORVASTATIN CALCIUM 20 MG: 20 TABLET, FILM COATED ORAL at 08:29

## 2019-05-14 RX ADMIN — OYSTER SHELL CALCIUM WITH VITAMIN D 1 TABLET: 500; 200 TABLET, FILM COATED ORAL at 08:29

## 2019-05-14 RX ADMIN — TRAZODONE HYDROCHLORIDE 100 MG: 100 TABLET ORAL at 21:56

## 2019-05-14 RX ADMIN — ACETAMINOPHEN 650 MG: 325 TABLET, FILM COATED ORAL at 11:07

## 2019-05-14 ASSESSMENT — ACTIVITIES OF DAILY LIVING (ADL)
ADLS_ACUITY_SCORE: 17

## 2019-05-14 NOTE — PLAN OF CARE
VSS, no O2 throughout the night. O2 93-98%. LS diminished, no shortness of breath reported. Pt c/o pain to lips. Prn tramadol given with some relief. Q3h acyclovir cream. Lips are less weepy and swollen. More dry. Still red and painful. Breathing is back to baseline per pt report. Continue to keep off O2 as able. Discharge in 1-2 days, possibly today. Continue to monitor per POC.

## 2019-05-14 NOTE — PLAN OF CARE
ORIENTATION: A&O  VS: Stable  TELE: N/A  LUNGS: 92-97% RA; Diminished  : WDL  GI:  Last BM 5/13/19  IV: Saline Locked  PAIN: Headache relieved with tylenol  ACTIVITY: SBA  DIET: Regular Diet; Room Service  PLAN: Possible discharge 1-2 days

## 2019-05-14 NOTE — PROGRESS NOTES
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  However, her course has been complicated by significant rash and chapping of her lips and some small ulcerations of her oral mucosa.  This had been treated with Aquaphor, Vaseline, among other medications without relief.  I suspect she likely has a viral infection as a cause.  Have started oral Valtrex and topical acyclovir to treat this     Problem List:   1. Acute hypoxic respiratory failure secondary to COPD exacerbation.  Improved.  Breathing now near baseline.  Has used intermittent oxygen in the past.     2. COPD with acute exacerbation suspect viral upper respiratory infection.  -Continue bronchodilators, nebulizers  -prednisone stopped  -Weaned off oxygen.    -Completed course of azithromycin.  -Infiltrate on chest x-ray, recommend repeat chest x-ray in 4 to 6 weeks, if he does not improve consider CT scan in 4-6 weeks     3. Hyponatremia-resolved     4. Acute kidney injury on chronic kidney disease stage III.  -Likely due to dehydration and diuretics  -Improved with hydration.  Lasix has been resumed.        5. Anxiety-continue sertraline 25 mg and trazodone 100 mg at bedtime     6. CLL-she has history of CLL, WBC is always elevated, it was 124K on admission,  down to 73K.. Is receiving therapy through Dr. Ivory of oncology     7.  Thrombocytopenia-no bleeding tendencies.  " resolved     8.  Numerous oral sores - suspect viral infection, likely herpes/cold sores.  Will continue with valacyclovir orally and topical acyclovir.     DVT Prophylaxis: scd  Code Status: DNR / DNI  Discharge Dispo: Home   Estimated Disch Date / # of Days until Disch:  Anticipate discharge tomorrow assuming some improvement in lip ulcerations          Interval History (Subjective):      Continues to have painful lip ulcerations.  No SOB; breathing at baseline.                   Physical Exam:      Last Vital Signs:  /61 (BP Location: Left arm)   Pulse 78   Temp 98.1  F (36.7  C) (Oral)   Resp 20   Ht 1.499 m (4' 11.02\")   Wt 43.5 kg (96 lb)   SpO2 92%   BMI 19.38 kg/m        Intake/Output Summary (Last 24 hours) at 5/14/2019 1324  Last data filed at 5/14/2019 0853  Gross per 24 hour   Intake 620 ml   Output --   Net 620 ml       Constitutional: Awake, alert, cooperative, no apparent distress   Respiratory: Clear to auscultation bilaterally, no crackles or wheezing   Cardiovascular: Regular rate and rhythm, normal S1 and S2, and no murmur noted   Abdomen: Normal bowel sounds, soft, non-distended, non-tender   Skin: No rashes, no cyanosis, dry to touch.  Crusting ulcerations of lips and nares; also has some crusting on nose   Neuro: Alert and oriented x3, no weakness, numbness, memory loss   Extremities: No edema, normal range of motion   Other(s):        All other systems: Negative          Medications:      All current medications were reviewed with changes reflected in problem list.         Data:      All new lab and imaging data was reviewed.   Labs:  Recent Labs   Lab 05/13/19  0644 05/10/19  0715 05/08/19  0708   WBC  --   --  73.5*   HGB  --   --  9.9*   HCT  --   --  32.0*   MCV  --   --  91    84* 87*      Imaging:   No results found for this or any previous visit (from the past 24 hour(s)).   "

## 2019-05-14 NOTE — PROGRESS NOTES
CLINICAL NUTRITION SERVICES - REASSESSMENT NOTE      Recommendations Ordered by Registered Dietitian (RD):     Continue oral nutrition supplements   Malnutrition 5/8, 5/14:   % Weight Loss: > 10% in 6 months (severe malnutrition)  % Intake:</= 75% for >/= 1 month (severe malnutrition)  Subcutaneous Fat Loss: Severe observed in Orbital region, Upper arm region, Thoracic region    Muscle Loss: Severe observed in Temporal region, Clavicle bone region, Acromion bone region, Scapular bone region and Dorsal hand region   Fluid Retention: None noted     Malnutrition Diagnosis: Severe malnutrition  In Context of:  Chronic illness or disease     EVALUATION OF PROGRESS TOWARD GOALS/NEW FINDINGS   Progress towards goals will be monitored and evaluated per protocol and Practice Guidelines    Diet order: Regular diet, Boost at 10 am, Plus2 shake 2 pm  Per flow sheet review, variable % intake for documented meals. Soundly sleeping this afternoon, lunch untouched at bedside. Nutrition associate continues to see daily for meal ordering assistance.       BM: 5/13, constipation    Generalized weakness     Mouth sores    Weight: 43 kg (5/7)    Meds: Oscal with D, ferrous sulfate 325 mg, Lasix 20 mg    Labs:   Recent Labs   Lab Test 05/13/19  0644 05/08/19  0708 05/07/19  1324 01/14/19  1336 01/07/19  1903   POTASSIUM 5.2 4.1 3.7 4.6 4.2     Recent Labs   Lab Test 05/01/19  1144 03/12/18  1009 05/12/17  1250 03/27/17  0550 03/26/17  0605   PHOS 3.8 3.9 3.3 2.9 2.3*     Recent Labs   Lab Test 05/01/19  1144 12/08/18  0651 03/12/18  1009 03/24/17  0545 03/23/17  0820   MAG 2.0 2.4* 2.1 1.7 2.4*     Recent Labs   Lab Test 05/13/19  0644 05/08/19  0708 05/07/19  1324 01/14/19  1336 01/07/19  1903    135 132* 135 138     Recent Labs   Lab Test 05/13/19  0644 05/10/19  0715 05/08/19  0708 05/07/19  1324 01/14/19  1336   CR 0.76 0.71 0.90 1.43* 1.01     Recent Labs   Lab 05/13/19  0644 05/08/19  0708   GLC 82 150*     Lab Results    Component Value Date    A1C 5.6 06/17/2016    A1C 5.6 06/16/2016    A1C 5.6 10/09/2014    A1C 5.7 07/18/2014       ASSESSED NUTRITION NEEDS  (PER APPROVED PRACTICE GUIDELINES, Dosing weight: 44 kg)  Estimated Energy Needs: 0328-2335+ kcals (30-35+ Kcal/Kg)  Justification: repletion  Estimated Protein Needs: 53-79+ grams protein (1.2-1.5+ g pro/Kg)  Justification: preservation of lean body mass + repletion  Estimated Fluid Needs: >1 mL/Kcal  Justification: maintenance     Previous Goals:   Patient to consume >/= 75% of meals TID and >/= 2 high protein supplements per day  Evaluation: Not consistently met    Previous Nutrition Diagnosis:   Malnutrition related to chewing difficulty, early satiety and increased needs for healing and chronic disease as evidenced by intake of <75% of estimated needs for >1-3 months, 20% weight loss in <6 months, fat and muscle wasting  Evaluation: Ongoing, no changes    CURRENT NUTRITION DIAGNOSIS  Malnutrition related to chewing difficulty, early satiety and increased needs for healing and chronic disease as evidenced by intake of <75% of estimated needs for >1-3 months, 20% weight loss in <6 months, fat and muscle wasting    INTERVENTIONS  Recommendations / Nutrition Prescription  Continue regular diet as ordered + oral nutrition supplements to increase protein and calorie intake    Implementation  Medical food supplement: as ordered  Collaboration and Referral of care: Discussed patient during interdisciplinary care rounds this morning    Goals  Patient to consume >/=75% of meals TID and >/= high protein supplements per day      MONITORING AND EVALUATION:  Progress towards goals will be monitored and evaluated per protocol and Practice Guidelines      Fabienne Mcdonald, SHAWN, LD, CNSC  Pager - 3rd floor/ICU: [phone]  Pager - All other floors: [phone]  Pager - Weekend/holiday: [phone]  Office: [phone]

## 2019-05-14 NOTE — PLAN OF CARE
VSS on RA. Pt denies CP and SOB. A/O. Pt c/o pain to lips, red, swollen and scabbing, Acyclovir ointment applied and cold to soothe. Tylenol given for headache. SBA with walker. Plan to discharge tomorrow.

## 2019-05-15 VITALS
HEIGHT: 59 IN | RESPIRATION RATE: 16 BRPM | HEART RATE: 78 BPM | WEIGHT: 99.8 LBS | TEMPERATURE: 98.2 F | SYSTOLIC BLOOD PRESSURE: 124 MMHG | BODY MASS INDEX: 20.12 KG/M2 | OXYGEN SATURATION: 90 % | DIASTOLIC BLOOD PRESSURE: 62 MMHG

## 2019-05-15 PROCEDURE — 25000132 ZZH RX MED GY IP 250 OP 250 PS 637: Performed by: INTERNAL MEDICINE

## 2019-05-15 PROCEDURE — 40000274 ZZH STATISTIC RCP CONSULT EA 30 MIN

## 2019-05-15 PROCEDURE — 94640 AIRWAY INHALATION TREATMENT: CPT

## 2019-05-15 PROCEDURE — 99238 HOSP IP/OBS DSCHRG MGMT 30/<: CPT | Performed by: INTERNAL MEDICINE

## 2019-05-15 PROCEDURE — 40000275 ZZH STATISTIC RCP TIME EA 10 MIN

## 2019-05-15 PROCEDURE — A9270 NON-COVERED ITEM OR SERVICE: HCPCS | Performed by: INTERNAL MEDICINE

## 2019-05-15 PROCEDURE — 25000125 ZZHC RX 250: Performed by: INTERNAL MEDICINE

## 2019-05-15 RX ORDER — ACETAMINOPHEN 325 MG/1
650 TABLET ORAL EVERY 4 HOURS PRN
Status: ON HOLD | COMMUNITY
Start: 2019-05-15 | End: 2019-06-19

## 2019-05-15 RX ORDER — VALACYCLOVIR HYDROCHLORIDE 1 G/1
1000 TABLET, FILM COATED ORAL EVERY 12 HOURS
Qty: 14 TABLET | Refills: 0 | Status: SHIPPED | OUTPATIENT
Start: 2019-05-15 | End: 2019-05-20

## 2019-05-15 RX ORDER — AMOXICILLIN 250 MG
1 CAPSULE ORAL 2 TIMES DAILY PRN
Qty: 30 TABLET | Refills: 1 | Status: ON HOLD | OUTPATIENT
Start: 2019-05-15 | End: 2019-06-19

## 2019-05-15 RX ORDER — IPRATROPIUM BROMIDE AND ALBUTEROL SULFATE 2.5; .5 MG/3ML; MG/3ML
3 SOLUTION RESPIRATORY (INHALATION) EVERY 4 HOURS PRN
Status: DISCONTINUED | OUTPATIENT
Start: 2019-05-15 | End: 2019-05-15 | Stop reason: HOSPADM

## 2019-05-15 RX ORDER — BISACODYL 10 MG
10 SUPPOSITORY, RECTAL RECTAL DAILY PRN
Status: DISCONTINUED | OUTPATIENT
Start: 2019-05-15 | End: 2019-05-15 | Stop reason: HOSPADM

## 2019-05-15 RX ORDER — ACYCLOVIR 50 MG/G
OINTMENT TOPICAL
Qty: 30 G | Refills: 1 | Status: SHIPPED | OUTPATIENT
Start: 2019-05-15 | End: 2019-06-04

## 2019-05-15 RX ADMIN — ACYCLOVIR: 50 OINTMENT TOPICAL at 00:23

## 2019-05-15 RX ADMIN — ACYCLOVIR: 50 OINTMENT TOPICAL at 03:14

## 2019-05-15 RX ADMIN — FERROUS SULFATE TAB 325 MG (65 MG ELEMENTAL FE) 325 MG: 325 (65 FE) TAB at 08:06

## 2019-05-15 RX ADMIN — FUROSEMIDE 20 MG: 20 TABLET ORAL at 08:06

## 2019-05-15 RX ADMIN — POLYETHYLENE GLYCOL 3350 17 G: 17 POWDER, FOR SOLUTION ORAL at 08:17

## 2019-05-15 RX ADMIN — IPRATROPIUM BROMIDE AND ALBUTEROL SULFATE 3 ML: .5; 3 SOLUTION RESPIRATORY (INHALATION) at 07:09

## 2019-05-15 RX ADMIN — ACETAMINOPHEN 650 MG: 325 TABLET, FILM COATED ORAL at 08:06

## 2019-05-15 RX ADMIN — BISACODYL 10 MG: 10 SUPPOSITORY RECTAL at 11:14

## 2019-05-15 RX ADMIN — METOPROLOL SUCCINATE 25 MG: 25 TABLET, EXTENDED RELEASE ORAL at 08:06

## 2019-05-15 RX ADMIN — SERTRALINE HYDROCHLORIDE 25 MG: 25 TABLET ORAL at 08:06

## 2019-05-15 RX ADMIN — ACYCLOVIR: 50 OINTMENT TOPICAL at 06:13

## 2019-05-15 RX ADMIN — ATORVASTATIN CALCIUM 20 MG: 20 TABLET, FILM COATED ORAL at 08:06

## 2019-05-15 RX ADMIN — OYSTER SHELL CALCIUM WITH VITAMIN D 1 TABLET: 500; 200 TABLET, FILM COATED ORAL at 08:06

## 2019-05-15 RX ADMIN — ACYCLOVIR: 50 OINTMENT TOPICAL at 13:11

## 2019-05-15 RX ADMIN — LEVOTHYROXINE SODIUM 75 MCG: 75 TABLET ORAL at 08:06

## 2019-05-15 RX ADMIN — VALACYCLOVIR HYDROCHLORIDE 1000 MG: 1 TABLET, FILM COATED ORAL at 08:06

## 2019-05-15 RX ADMIN — MULTIPLE VITAMINS W/ MINERALS TAB 1 TABLET: TAB at 08:06

## 2019-05-15 RX ADMIN — ACYCLOVIR: 50 OINTMENT TOPICAL at 08:41

## 2019-05-15 RX ADMIN — FLUTICASONE FUROATE AND VILANTEROL TRIFENATATE 1 PUFF: 200; 25 POWDER RESPIRATORY (INHALATION) at 07:09

## 2019-05-15 ASSESSMENT — ACTIVITIES OF DAILY LIVING (ADL)
ADLS_ACUITY_SCORE: 17

## 2019-05-15 ASSESSMENT — MIFFLIN-ST. JEOR: SCORE: 793.57

## 2019-05-15 NOTE — PLAN OF CARE
A&O. VSS. Pain to lips but declined PRN medication use. LS diminished. Remains on room air. Acyclovir ointment applied Q 3 hrs. Up SBA with walker. Possible discharge today.   Zaria Booker RN on 5/15/2019 at 5:12 AM

## 2019-05-15 NOTE — PROGRESS NOTES
"[facility] RCAT     Date: 5/15/2019  Admission Dx: COPD  Pulmonary History: COPD  Home Nebulizer/MDI Use:  Albuterol 2 puffs Q6 hours prn, Dulera BID.  Home Oxygen: 1-2 Lpm Nasal Cannula prn  Acuity Level (RCAT flow sheet):4  Aerosol Therapy initiated: Change Duoneb from QID to Q4 hours prn.  Continue Albuterol Q2 hours prn and Breo daily.      Pulmonary Hygiene initiated: Deep breath and cough TID.      Volume Expansion initiated: IS TID; if she is unable to use IS due to her lips use deep breathing instead.      Current Oxygen Requirements: Room air  Current SpO2: 90%    Re-evaluation date: 5/18/2019    Patient Education: Informed Pt of RCAT and she in in agreement with the changes.  Discussed effects of her inhaled medications.      See \"RT Assessments\" flow sheet for patient assessment scoring and Acuity Level Details.     Reji Rodriguez  May 15, 2019.7:09 AM              "

## 2019-05-15 NOTE — PLAN OF CARE
VSS. Pt denies Cp, SOB, and n/v. Pt reports pain to lips and inner nose: red, scabbing, cracked and swollen; acyclovir, tylenol and tramadol given with minimal reduction in pain. Pt refused benzocaine spray. MD paged asking for other pain alternatives. Diminished LS with infrequent non-productive cough. SBA with walker.

## 2019-05-15 NOTE — PLAN OF CARE
A&O X4. Up SBA with walker, gait belt. VSS on RA. C/o lip and rectum pain. Sores on upper/lower lips - some scabbed and some oozing. Topical cream applied, Tylenol given. PRN Miralax, suppository for constipation. Multiple BM after suppository - both inc/cont.    Discharged back to independent living facility with medications and discharge instructions. Educated provided and all questions answered. Belongings sent.

## 2019-05-15 NOTE — DISCHARGE SUMMARY
Admit Date:     05/07/2019   Discharge Date:     05/15/2019      PRINCIPAL FINAL DIAGNOSES:   1.  Acute hypoxic respiratory failure secondary to chronic obstructive pulmonary disease exacerbation.   2.  Chronic obstructive pulmonary disease exacerbation, suspected due to viral upper respiratory infection.  Weaned off prednisone and oxygen by the time of discharge.   3.  Infiltrate noted on chest x-ray, with recommendation for repeat chest x-ray in 4-6 weeks.  If does not improve, consider chest CT scan at that time.  Discussed with patient who can follow with primary care provider to get this performed   4.  Acute renal failure, chronic kidney disease, improved with hydration this admission.   5.  Severe herpes labialis (cold sores) diagnosed and treated this admission.   6.  Severe malnutrition in the context of chronic medical condition.  Appreciate nutrition assessment this admission.       PAST MEDICAL HISTORY:   1.  History of chronic obstructive pulmonary disease.  The patient does use intermittent oxygen in the past.  By time of discharge, she was titrated off oxygen this admission.   2.  History of chronic lymphocytic leukemia.  The patient followed with Dr. Ivory of Oncology.   3.  History of thrombocytopenia.   4.  Anxiety history.   5.  Chronic kidney disease.   6.  Bladder cancer history.   7.  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   8.  Hypothyroidism.   9.  Hypertension.  10.   Severe malnutrition in the context of chronic illness or disease       PRINCIPAL PROCEDURES THIS ADMISSION:   1.  Chest x-ray showing possible nodular changes in the lung apices bilaterally.  Consider CT scan for further evaluation.   2.  Steroids this admission.   3.  Antibiotics.   4.  Initiation of antiviral medication for herpes labialis.      REASON FOR ADMISSION:  Please see dictated history and physical.  In brief, Ms. Kline is an 87-year-old female with the above medical history including COPD on  intermittent home oxygen.  She presented to the hospital with increasing shortness of breath and COPD exacerbation.      HOSPITAL COURSE:   1.  Chronic obstructive pulmonary disease exacerbation:  The patient responded well to management for this including steroids, antibiotic course with azithromycin, and nebulizer therapy.  By the time of discharge, her breathing was at baseline.  She was not requiring any oxygen on discharge.  She has been weaned off prednisone by time of discharge as well.  She looked stable for discharge today.  Her course was complicated by significant ulcerations and sores of her lips.  This was initially treated with Vaseline type therapies.  Symptoms actually worsened with this therapy.  It is suspected the patient likely had a herpes labialis (cold sores) as a cause for this.  After initiation of topical acyclovir ointment and oral Valtrex, symptoms began to improve.  She is being discharged with these medications to be used for the next week.  Diagnosis was suspected based on appearance but not confirmed with any lab data.      The patient was discharged back to her care center today.      DISCHARGE MEDICATIONS:   1.  Acetaminophen as needed for pain.   2.  Acyclovir external ointment, apply to lips q.3-4h.  while awake for 1 week until lips healed.   3.  Albuterol as needed.   4.  Aspirin 325 mg daily.   5.  Atorvastatin 20 mg daily.   6.  Calcium with vitamin D.   7.  Ferrous sulfate 325 mg daily with breakfast.   8.  Breo Ellipta 1 puff into the lungs daily.   9.  Lasix 20 mg daily.   10.  Gabapentin 300 mg at bedtime.   11.  Levothyroxine 75 mcg daily.   12.  Metoprolol extended release 25 mg daily.   13.  Multivitamin daily.   14.  Zofran as needed.   15.  Senokot 1 tablet twice a day as needed for constipation.   16.  Zoloft 25 mg daily.   17.  Tramadol 50 mg every 6 hours as needed for moderate to severe pain.   18.  Trazodone 100 mg nightly as needed for sleep.   19.  Valtrex 1000  mg every 12 hours for 7 days.      FOLLOWUP INSTRUCTIONS:   1.  Follow up with primary MD within 1 week after discharge from the hospital.   2.  Recommend repeat chest x-ray with primary MD in 6 weeks to make sure nodules on her chest x-ray resolved with antibiotic treatment received this admission.  If still present, would recommend a chest CT scan to further evaluate.  Discussed with her primary MD at next clinic visit.      I saw and examined the patient on day of discharge.         BLAINE VASQUEZ MD             D: 05/15/2019   T: 05/15/2019   MT: AS      Name:     MAGGI RODRIGUEZ   MRN:      [id number]        Account:        [id number]   :      1932           Admit Date:     2019                                  Discharge Date: 05/15/2019      Document: [id number]       cc: Piper Kiser MD

## 2019-05-15 NOTE — PROGRESS NOTES
Pt seen and examined.  resp status at baseline.  Asking for suppository.  Lip lesions starting to dry, crust, and heal.  No new lesions.      Appears stable to return to her living facility today.  I d/w JOSIAS

## 2019-05-15 NOTE — PROGRESS NOTES
Transition Communication Hand-off for Care Transitions to Next Level of Care Provider    Name: Marie Kline  : 1932  MRN #: [id number]  Primary Care Provider: Piper Kiser  Primary Care MD Name: Dr. ABBY Kiser  Primary Clinic: [facility] [address] [facility] [address]  Primary Care Clinic Name: [facility]  Reason for Hospitalization:  CLL (chronic lymphocytic leukemia) (H) [C91.90]  Hypoxia [R09.02]  COPD exacerbation (H) [J44.1]  Acute kidney injury (H) [N17.9]  Admit Date/Time: 2019 12:49 PM  Discharge Date: 5/15  Payor Source: Payor: MEDICARE / Plan: MEDICARE / Product Type: Medicare /     Readmission Assessment Measure (CARLOS MANUEL) Risk Score/category: elevated         Reason for Communication Hand-off Referral: Admission diagnoses: COPD    Discharge Plan: home to [facility]    Discharge Needs Assessment:  Needs      Most Recent Value   # of Referrals Placed by CTS  Scheduled Follow-up appointments     Follow-up plan:    Future Appointments   Date Time Provider Department Center   2019 11:00 AM Piper Kiser MD [facility]       Key Recommendations:  Patient admitted with COPD exacebation and suspect viral URI hypoxemic respiratory failure. Patient being treated with oxygen, IV steriods, nebs and azithromax. CTS following for COPD and elevated CARLOS MANUEL. Patient lives independently at [facility]. She receives meal services, 20 meals/month. Patient managing her own medications. She gets her prescriptions mailed from [facility]. She reports no problems taking or getting her medications. Patient uses a cane as needed. She has portable oxygen and nebulizer equipment at home. She uses them independently as needed. Patient drives herself to appointments. Her neighbor and family are supportive. Per patient request, follow up appointment requested with Dr. Kiser.  Patient doesn't anticipate any discharge needs.    Her new meds on discharge are breo ellipta inhaler, acyclovir oinment  for her dry cracked lips and valtrex    Follow up appointment scheduled with Dr. Kiser at [facility], Monday, May 20th at 11:00am.         Vaishnavi Garcias    AVS/Discharge Summary is the source of truth; this is a helpful guide for improved communication of patient story

## 2019-05-16 ENCOUNTER — PATIENT OUTREACH (OUTPATIENT)
Dept: CARE COORDINATION | Facility: CLINIC | Age: 84
End: 2019-05-16

## 2019-05-16 DIAGNOSIS — J44.1 COPD WITH ACUTE EXACERBATION (H): ICD-10-CM

## 2019-05-16 DIAGNOSIS — J44.1 COPD EXACERBATION (H): Primary | ICD-10-CM

## 2019-05-16 ASSESSMENT — ACTIVITIES OF DAILY LIVING (ADL): DEPENDENT_IADLS:: INDEPENDENT

## 2019-05-16 NOTE — PROGRESS NOTES
Clinic Care Coordination Contact  [facility]/Voicemail    Referral Source: IP Handoff.  CARLOS MANUEL Elevated.  Clinical Data: Care Coordinator Outreach.  Chart reviewed.  This is 3rd ED/IP admission this year.  Patient admitted to [facility] 5/7-5/15 for treatment of COPD exacerbation and URI.  Patient has home O2 and uses PRN.  Patient discharged home to self care.  Patient has F/U with PCP on 5/20 at 1100.    Outreach attempted x 1.  Left message on voicemail with call back information and requested return call.  Plan:  Care Coordinator will try to reach patient again in 3-5 business days. Or will attempt to meet at OV.    Babs Garica RN  Care Coordination  Phone:  [phone]  Email: [email or web address]

## 2019-05-16 NOTE — PROGRESS NOTES
Clinic Care Coordination Contact    Clinic Care Coordination Contact  OUTREACH    Patient admitted to [facility] 5/7-5/15 for treatment of COPD exacerbation and URI.  Patient has home O2 and uses PRN.  Patient discharged home to self care.    RN CC spoke with patient over phone.  Patient has been enrolled in care coordination.     Primary Diagnosis: COPD(upper respiratory illness)    Chief Complaint   Patient presents with     Clinic Care Coordination - Post Hospital     [facility] 5/7-5/15         Universal Utilization: CARLOS MANUEL Elevated  Clinic Utilization  Difficulty keeping appointments:: No  Compliance Concerns: No  No-Show Concerns: No  No PCP office visit in Past Year: No  Utilization    Last refreshed: 5/16/2019 12:45 PM:  Hospital Admissions 3           Last refreshed: 5/16/2019 12:45 PM:  ED Visits 3           Last refreshed: 5/16/2019 12:45 PM:  No Show Count (past year) 3              Current as of: 5/16/2019 12:45 PM            Clinical Concerns:  Patient returning call in tears.  States she is having a really hard time managing things at home by herself.  Patient c/o shooting back pain that has been debilitating as well as diarrhea.  Patient also developed mouth lesions while in hospital that they treated as herpes virus.  Patient states her mouth is so painful it makes it difficult to eat.  Patient is concerned about mouth because she has never had anything like this before.    Back pain is prohibiting her from moving around apartment.  She states she is able to make it to bathroom, most of the time.  Diarrhea comes on so forcefully that it shoots up her back and it takes her up to an hour to get cleaned up.  Patient's daughter, Margaret is usually available to assist when needed, however she recently had knee replacement surgery and is recovering.    Patient is requesting home care services.  RN CC agree's patient needs more support at home at this time.  Will send referral to PCP to sign if she agree's.   Will also send referral to community paramedic.    Patient is in agreement with plan and is very thankful for RN CC support.    Medication Management:  Reviewed.  Patient stated she took all her meds this morning that she was suppose to.  But states she needs help setting them up.    Functional Status:  Dependent ADLs:: Ambulation-walker, Ambulation-cane  Dependent IADLs:: Independent  Bed or wheelchair confined:: No  Mobility Status: Independent w/Device    Living Situation:  Current living arrangement:: I live alone, I live in assisted living  Type of residence:: [facility] Senior Living    Diet/Exercise/Sleep:  Diet:: Regular(soft food)  Inadequate nutrition (GOAL):: No  Food Insecurity: No  Tube Feeding: No  Exercise:: Currently not exercising  Inadequate activity/exercise (GOAL):: No  Significant changes in sleep pattern (GOAL): No    Transportation:  Patient does not drive, rely's on family and friends.     Psychosocial:  Daughter is very helpful/supportive.  She recently had knee replacement surgery and is not able to help patient at the moment.    Community Resources: None  Supplies used at home:: Oxygen Tubing/Supplies, Nebulizer tubing  Equipment Currently Used at Home: walker, rolling, wheelchair, manual, raised toilet, shower chair, cane, straight    Advance Care Plan/Directive  Advanced Care Plans/Directives on file:: Yes  Type Advanced Care Plans/Directives: POLST  Advanced Care Plan/Directive Status: Not Applicable    Referrals Placed: Home care and community paramedic     Goals:   Goals        General    baseline health     Notes - Note created  5/16/2019  1:29 PM by Babs Garcia RN    Goal Statement: I will get back my independence.  Measure of Success: I will not have any pain.  I will be at baseline activity level.  I will not have any ED/IP admissions related to chronic health conditions.    Supportive Steps to Achieve: Patient to take medications as prescribed.  Patient will follow up with  providers as recommended.  Patient will follow home care recommendations.  Patient will be supported by RN CC and community paramedic.  Barriers: back pain, not managed well.  Strengths: Patient is able to advocate for self.  Willing to accept recommendations and feedback.  Date to Achieve By: 1 July 2019  Patient expressed understanding of goal: yes.                Plan:   -Patient has F/U with PCP on 5/20 at 1100.  -Community paramedic referral pended for PCP to sign.  Spoke with Aydee, she may be able to see patient as soon as Friday afternoon.  Aydee will outreach to patient.  -Home care referral pended for PCP to sign if in agreement.  -patient enrolled in care coordination and encouraged to call with any further questions or needs.  Will continue to follow and outreach month or as needed.    Babs Garcia RN  Care Coordination  Phone:  [phone]  Email: [email or web address]  [facility]

## 2019-05-17 ENCOUNTER — ALLIED HEALTH/NURSE VISIT (OUTPATIENT)
Dept: BEHAVIORAL HEALTH | Facility: CLINIC | Age: 84
End: 2019-05-17
Payer: MEDICARE

## 2019-05-17 ENCOUNTER — TELEPHONE (OUTPATIENT)
Dept: INTERNAL MEDICINE | Facility: CLINIC | Age: 84
End: 2019-05-17

## 2019-05-17 VITALS
DIASTOLIC BLOOD PRESSURE: 52 MMHG | HEART RATE: 70 BPM | RESPIRATION RATE: 20 BRPM | OXYGEN SATURATION: 92 % | SYSTOLIC BLOOD PRESSURE: 80 MMHG

## 2019-05-17 DIAGNOSIS — J44.1 COPD WITH ACUTE EXACERBATION (H): Primary | ICD-10-CM

## 2019-05-17 PROCEDURE — 99207 C COMMUNITY PARAMEDIC - PATIENT NOT BILLABLE: CPT

## 2019-05-17 ASSESSMENT — ACTIVITIES OF DAILY LIVING (ADL): DEPENDENT_IADLS:: INDEPENDENT

## 2019-05-17 NOTE — Clinical Note
Bebeto pt was instructed to hold her Lasix per PCP.  She does have a home blood pressure machine and she states she will take her BP at least 3 times a day until her PCP visit on 5/20.  Her home machine had a low BP reading with an appropriate size cuff.  She was advised she could call me any time over the weekend with questions or concerns.

## 2019-05-17 NOTE — TELEPHONE ENCOUNTER
Called paramedic.  She did not have accurate cuff size for the patient.  Patient does not want to go back to the hospital.   Denies any fever or chills.  Breathing has been back to normal.    She has not been drinking much- 1 cup of fluids today.    Recommend patient push the fluids as able with her mouth sores.    Will have patient hold her lasix since she is not staying as hydrated as usual.    Patient to go to ER if she develops symptoms of lightheadedness, etc.    Patient declines go to the ER currently, knowing the risks of her low blood pressure.

## 2019-05-17 NOTE — TELEPHONE ENCOUNTER
Aydee, the Community Paramedic calls.     She is seeing pt now.     BP low at 78/40, 78. Denies feeling any symptoms.     Urinating once a day x 3 days. Constipated as well.     Pt has cold sores around her mouth and is not taking much in orally. She is on Valtrex and has ointment Rx.   She was just discharged from Hospital.     Taking Furosemide and Metoprolol. Hospital discontinued her Lisinopril.     Please advise.       Aydee, Ph #[phone]

## 2019-05-17 NOTE — PROGRESS NOTES
[facility]amedics Initial Visit  May 17, 2019 TIME:1200    Marie Kline is a 87 year old female being seen at home for a [facility] Home visit.    Present at appointment: Patient    Primary Diagnosis: COPD    Chief Complaint   Patient presents with     Hospital F/U       [place] Utilization:   Clinic Utilization  Difficulty keeping appointments:: No  Compliance Concerns: No  No-Show Concerns: No  No PCP office visit in Past Year: No  Utilization    Last refreshed: 5/17/2019  2:20 PM:  Hospital Admissions 3           Last refreshed: 5/17/2019  2:20 PM:  ED Visits 3           Last refreshed: 5/17/2019  2:20 PM:  No Show Count (past year) 3              Current as of: 5/17/2019  2:20 PM              Clinical Concerns:  Current Medical Concerns:  Low BP, rectum pain    Current Behavioral Concerns: na    Education Provided to patient: yes, when to call for 911.     Vitals: BP (!) 80/52 (BP Location: Right arm, Patient Position: Sitting, Cuff Size: Adult Small)   Pulse 70   Resp 20   SpO2 92%   BMI: There is no height or weight on file to calculate BMI.    Pain  Pain (GOAL):: Yes  Type: Acute (<3mo)  Location of chronic pain:: rectum  Radiating: Yes  Location pain radiates to: abdomen  Progression: Unchanged  Description of pain: Penetrating, Sharp, Shooting  Limitation of routine activities due to chronic pain:: Yes  Health Maintenance Reviewed: Up to date    Clinical Pathway: None    Review of Symptoms/PE    Skin: negative  Eyes: negative  Ears/Nose/Throat: negative  Respiratory: Cough- chronic, non productive  Cardiovascular: negative  Gastrointestinal: poor appetite, abdominal pain, constipation and diarrhea  Genitourinary: negative  Musculoskeletal: negative  Neurologic: tremor  Psychiatric: negative    Medication Management:    Medications need to be refilled:yes    Medications set up? Yes  Pill Box issued: No  Scale issued: No    Functional Status:  Dependent ADLs:: Ambulation-walker  Dependent  IADLs:: Independent  Bed or wheelchair confined:: No  Mobility Status: Independent w/Device  Fallen 2 or more times in the past year?: No  Any fall with injury in the past year?: No    Living Situation:  Current living arrangement:: I live alone  Type of residence:: Independent Senior Living    [facility] Home Safety Assessment  Floors:  Are there throw rugs on the floor?: No  Are there papers, books, towels, shoes, magazines on the floor?: No  Are there loose wires and cords you need to walk around? : No  Stairs and Steps  Are there papers, books, towels, shoes, magazines on the stairs?: No  Are some steps broken or uneven?: No  Is there a light over the stairway?: No  Has the stairway bulb burned out?: No  Is the carpet on the steps loose or torn?: No  Are the handrails loose or broken?: No  Kitchen:  Are the things you use often on high shelves?: No  Is your step stool unsteady?: No  Bathrooms:  Is the tub or shower floor slippery?: No  Do you need support when you get in and out of the tub?: Yes  Bedrooms:  Is the light near the bed hard to reach?: No  Is the path from your bed to the bathroom dark?: No  Comments: : The home is very clean and well kept    Diet/Exercise/Sleep:  Diet:: Other  Inadequate nutrition (GOAL):: No  Food Insecurity: No  Tube Feeding: No  Exercise:: Currently not exercising  Inadequate activity/exercise (GOAL):: No  Significant changes in sleep pattern (GOAL): No    Transportation:  Transportation concerns (GOAL):: Yes  Transportation means:: Family, Friend     Psychosocial:  Anglican or spiritual beliefs that impact treatment:: Yes  Mental health DX:: No  Mental health management concern (GOAL):: No  Informal Support system:: Children, Family         No  Face to Face in Home / Community    Today's PHQ-2 Score:      Today's PHQ-9 Score: No flowsheet data found.     Today's GAD7 score:   LESLY-7 SCORE 12/19/2018   Total Score 12            Financial/Insurance:    Financial/Insurance concerns (GOAL):: No       Resources and Interventions:  Current Resources:         Supplies used at home:: Incontinence Supplies, Wipes  Equipment Currently Used at Home: walker, rolling         Referrals Placed: None     HEALTH CARE GOALS:  Goals Addressed as of 5/17/2019 at 4:33 PM       Increase water intake     Added 5/17/19 by Aydee Rodriguez, EMT     Goal Statement: Increase water intake due to low BP  Measure of Success: Increased BP  Supportive Steps to Achieve: access to water and Boost  Barriers: Severe infection of lips  Strengths: Determination, F/U by Community Paramedic  Date to Achieve By: 5/20  Patient expressed understanding of goal: yes            Patient Active Problem List   Diagnosis     Acute and chronic respiratory failure with hypercapnia (HCC)     Nonischemic cardiomyopathy (H)     Pneumonia     Acute myocardial infarction, initial episode of care ([facility])     CLL (chronic lymphocytic leukemia) (HCC)     CHF (congestive heart failure) ([facility])     Cardiomyopathy in other diseases classified elsewhere (HCC)     Hyperlipidemia with target LDL less than 130     Health Care Home     COPD exacerbation (H)     LESLY (generalized anxiety disorder)     Hypothyroidism     Back pain with radiation     DDD (degenerative disc disease), lumbar     Splenomegaly     Lumbar degenerative disc disease     Lumbar foraminal stenosis     Central spinal stenosis     Bladder cancer (H)     Sepsis (H)     Senile osteoporosis     CKD (chronic kidney disease) stage 3, GFR 30-59 ml/min (H)     Purpura senilis (H)     Xerosis cutis     Malignant neoplasm of urinary bladder, unspecified site (H)     Hypoxia     Femoral neck fracture (H)     S/P total hip arthroplasty     NSTEMI (non-ST elevated myocardial infarction) (H)     Stress-induced cardiomyopathy     COPD with acute exacerbation (H)         Current Outpatient Medications:      albuterol (PROVENTIL) (2.5 MG/3ML) 0.083% neb solution, Take 2.5 mg  by nebulization every 6 hours as needed for shortness of breath / dyspnea or wheezing, Disp: , Rfl:      aspirin 325 MG EC tablet, Take 1 tablet (325 mg) by mouth daily, Disp: 45 tablet, Rfl: 0     atorvastatin (LIPITOR) 20 MG tablet, TAKE ONE TABLET BY MOUTH ONE TIME DAILY , Disp: 90 tablet, Rfl: 1     ferrous sulfate (IRON) 325 (65 FE) MG tablet, Take 325 mg by mouth daily (with breakfast) , Disp: , Rfl:      fluticasone-vilanterol (BREO ELLIPTA) 200-25 MCG/INH inhaler, Inhale 1 puff into the lungs daily, Disp: 2 Inhaler, Rfl: 11     furosemide (LASIX) 20 MG tablet, Take 1 tablet (20 mg) by mouth daily, Disp: 90 tablet, Rfl: 1     gabapentin (NEURONTIN) 300 MG capsule, Take 300 mg by mouth At Bedtime, Disp: , Rfl:      levothyroxine (SYNTHROID/LEVOTHROID) 75 MCG tablet, TAKE ONE TABLET BY MOUTH ONE TIME DAILY , Disp: 90 tablet, Rfl: 1     metoprolol succinate (TOPROL-XL) 25 MG 24 hr tablet, Take 1 tablet (25 mg) by mouth daily, Disp: 90 tablet, Rfl: 3     Multiple Vitamins-Minerals (MULTIVITAMIN GUMMIES ADULT PO), Take 2 tablets by mouth daily , Disp: , Rfl:      order for DME, Equipment being ordered: portable oxygen;  Ambulatory O2 saturation of 87%, Disp: 1 each, Rfl: 1     order for DME, Equipment being ordered: Nebulizer Fax order to [facility] [phone], Disp: 1 each, Rfl: 0     senna-docusate (SENOKOT-S/PERICOLACE) 8.6-50 MG tablet, Take 1 tablet by mouth 2 times daily as needed for constipation, Disp: 30 tablet, Rfl: 1     sertraline (ZOLOFT) 25 MG tablet, Take 1 tablet (25 mg) by mouth daily, Disp: 90 tablet, Rfl: 3     TRAMADOL HCL PO, Take 50 mg by mouth every 6 hours as needed for moderate to severe pain , Disp: , Rfl:      traZODone (DESYREL) 50 MG tablet, TAKE 2 TABLETS BY MOUTH NIGHTLY AS NEEDED FOR SLEEP, Disp: 180 tablet, Rfl: 2     valACYclovir (VALTREX) 1000 mg tablet, Take 1 tablet (1,000 mg) by mouth every 12 hours for 7 days, Disp: 14 tablet, Rfl: 0     acetaminophen (TYLENOL)  325 MG tablet, Take 2 tablets (650 mg) by mouth every 4 hours as needed for mild pain, Disp: , Rfl:      acyclovir (ZOVIRAX) 5 % external ointment, Apply to lips every 3-4 hours while awake for 1 week or until lips healed, Disp: 30 g, Rfl: 1     albuterol (PROAIR HFA/PROVENTIL HFA/VENTOLIN HFA) 108 (90 BASE) MCG/ACT Inhaler, Inhale 2 puffs into the lungs every 6 hours as needed for wheezing, Disp: 1 Inhaler, Rfl: 8     calcium carbonate-vitamin D (OS-CARLOS) 500-400 MG-UNIT tablet, Take 1 tablet by mouth daily, Disp: , Rfl:      ondansetron (ZOFRAN-ODT) 4 MG ODT tab, Take 1 tablet (4 mg) by mouth every 6 hours as needed for nausea or vomiting (Patient not taking: Reported on 5/17/2019), Disp: 20 tablet, Rfl: 0    Plan:    Functional Status:  Comment: Weak and incontinent of stool and urine   Plan: Monitor and F/U with PCP    Mental Health/Cognition:  Comment: normal  Plan:     Health Care Goals:  Comment: Stay out of ER over the weekend    Time spent with patient: 3 hours    The patient meets one or more of the following criteria:  * Requires set up of services for discharge from a nursing home or hospital    Acute concern/Follow-up recommendations: low BP    Next CP visit scheduled: TBD    Issues for Provider to follow up on: Low BP, size of cuff made for a possible inaccurate reading.  She has a home BP machine with an appropriate size cuff that read low as well, see above.  The pt has been constipated for the past few days but now had green diarrhea.  She is also confused about which BP medication she should be taking.  I confirmed with the pharmacy it was Metoprolol and not Lisinopril but she would like a confirmation from you.  Her Lasix was removed from her pill box and she was instructed to stop until further notice.  She was also advise of symptoms to watch out for and when to call 911.  I told her she was able to call me all weekend if needed.  I made her a Boost icecream shake before I left.    Provider  follow up visit needed: yes, 5/20/2019

## 2019-05-19 ENCOUNTER — DOCUMENTATION ONLY (OUTPATIENT)
Dept: INTERNAL MEDICINE | Facility: CLINIC | Age: 84
End: 2019-05-19

## 2019-05-19 NOTE — PROGRESS NOTES
[facility] now requests orders and shares plan of care/discharge summaries for some patients through [employer].  Please REPLY TO THIS MESSAGE OR ROUTE BACK TO THE AUTHOR in order to give authorization for orders when needed.  This is considered a verbal order, you will still receive a faxed copy of orders for signature.  Thank you for your assistance in improving collaboration for our patients.    ORDER Skilled nursing visits 2w1, 3w1, 2w2, 1w2, 3 as needed visits to evalaute, educate, and management of medications, disease process, home safety, skin integrity, and lab draws as needed.   PT 1 every 10 day 1 to evalaute adn treat to include fall prevention safety, strength and mobility, transfer safety, and home exercise program.   OT 1 every 10 day 1 to evaluate and treat to include ADL/IADL safety, balance safety, adaptive techniques, and adaptive equipment needs.   SW 1 every 14 day 1 to assist with community resources to include transportation.   HHA 1w6 to assist with personal cares and bathing safety.     MD SUMMARY/PLAN OF CARE  SUMMARY TO MD LEARY  Marie is an 87 year old female admitted to [facility] following referral from clinic for client after she was discharged from transitional care. She was started on Dulera in TCU and stopped and medication changed back to her Breo that she has been taking. SHe does not have Breo in home, new prescription for Dulera was sent to her home prior to the discharge from the hospital. She was started on Acyclovir creme and pill for treatmetn of mouth sores. She was severely constipated adn has a history of frequent constipation, being started on Senna with Docusate sodium. Last bowel movement 5/19/19.   VS...Temp 99.1 HR 56 RR 14 to 22 /54 SpO2 88 to 92 percent in room air, Wt. 102 Ht. 59 in  WOUND DESCRIPTION AND MEASUREMENTS  The outer part of her mouth and into and around her nose she has multiple sores reportedly caused by a virus that she is  receiving treatment with an antiviral creme and pill.   PHYSICAL PSYCHOSOCIAL IMPAIRMENT OR LIMITATIONS She uses a walker for ambulationsteady with ambulation however she tires quickly and has to walk short distances with frequent rests.   NUTRITION CONCERNS... Related to the sores around her mouth, eating is difficult related to the pain. She drinks generaic Ensure as a nutritional supplement, heats up prepared food that is in her refridgerator as she feels up to eating.   HOME ENVIRONMENT AFFECTING CARE and CAREGIVER SUPPORT...She lives alone in an assisted living facilty. Her daughter is the usual outside of the facilty primary caregiver , however is not available to assist currently related to her own health concerns. Client is limited on assistance at the time. A friend assists her to set up her medications, otherwise she sets up her own medications. She lives in the independent part of the facilty and receives no assistance. She has the facility alert button to press if she needs help and she keeps her cell phone close by to call for assistance with preprogrammed numbers.  BACKGROUND She has a history of Pneumonia, congestive heart failure, chronic kidney disease stage 3, S/P total hip arthroplasty, NSTEMI, Acute and chronic respiratory failure with hypercapnia, Nonischemic cardiomyopathy, chronic lymphocytic leukemia, Hyperlipidemia, generalized anxiety disorder, Hypothyroidism, Back pain with radiation, Lumbar degenerative disc disease, Splenomegaly, Lumbar foraminal stenosis, Central spinal stenosis, Bladder cancer, Senile osteoporosis, Purpura senilis, Xerosis cutis, Hypoxia, and falls.  ANALYSIS She is dependent on others for assistancew ith transportation and to meet daily needs safely. She will benefit from ongoing home care services to assist with client/caregiver education, home safety, to meet transportation needs, and care coordination to meet daily care needs.  RECOMMENDATION Skilled nursing visits  to evalaute, educate, and management of medications, disease process, home safety, skin integrity, and lab draws as needed. PT to evalaute adn treat to include fall prevention safety, strength and mobility, transfer safety, and home exercise program. OT to evaluate and treat to include ADL/IADL safety, balance safety, adaptive techniques, and adaptive equipment needs. SW to assist with community resources to include transportation. HHA to assist with personal cares and bathing safety.

## 2019-05-20 ENCOUNTER — PATIENT OUTREACH (OUTPATIENT)
Dept: CARE COORDINATION | Facility: CLINIC | Age: 84
End: 2019-05-20

## 2019-05-20 ENCOUNTER — OFFICE VISIT (OUTPATIENT)
Dept: INTERNAL MEDICINE | Facility: CLINIC | Age: 84
End: 2019-05-20
Payer: MEDICARE

## 2019-05-20 VITALS
SYSTOLIC BLOOD PRESSURE: 108 MMHG | OXYGEN SATURATION: 95 % | TEMPERATURE: 98 F | HEART RATE: 71 BPM | DIASTOLIC BLOOD PRESSURE: 58 MMHG | HEIGHT: 59 IN | WEIGHT: 99.8 LBS | BODY MASS INDEX: 20.12 KG/M2 | RESPIRATION RATE: 16 BRPM

## 2019-05-20 DIAGNOSIS — B00.1 COLD SORE: ICD-10-CM

## 2019-05-20 DIAGNOSIS — I50.20 SYSTOLIC CONGESTIVE HEART FAILURE, UNSPECIFIED HF CHRONICITY (H): ICD-10-CM

## 2019-05-20 DIAGNOSIS — Z87.01 H/O: PNEUMONIA: Primary | ICD-10-CM

## 2019-05-20 PROCEDURE — 99495 TRANSJ CARE MGMT MOD F2F 14D: CPT | Performed by: INTERNAL MEDICINE

## 2019-05-20 RX ORDER — VALACYCLOVIR HYDROCHLORIDE 1 G/1
1000 TABLET, FILM COATED ORAL EVERY 12 HOURS
Qty: 6 TABLET | Refills: 0 | Status: SHIPPED | OUTPATIENT
Start: 2019-05-20 | End: 2019-06-04

## 2019-05-20 ASSESSMENT — MIFFLIN-ST. JEOR: SCORE: 793.32

## 2019-05-20 ASSESSMENT — ACTIVITIES OF DAILY LIVING (ADL): DEPENDENT_IADLS:: INDEPENDENT

## 2019-05-20 NOTE — NURSING NOTE
"/58   Pulse 71   Temp 98  F (36.7  C) (Oral)   Resp 16   Ht 1.499 m (4' 11\")   Wt 45.3 kg (99 lb 12.8 oz)   SpO2 95%   Breastfeeding? No   BMI 20.16 kg/m      "

## 2019-05-20 NOTE — PATIENT INSTRUCTIONS
Chest xray in approximately 6 weeks    Okay to take 3 additional days of the valtrex from what was prescribed.    Continue off of the lasix- monitor weight, as you may need to resume this when you are feeling better and drinking more  (greater than 2 pounds in 1 day or 5 pounds in 1 week could mean fluid build up from your heart)

## 2019-05-20 NOTE — PROGRESS NOTES
.    Marie Kline is a 87 year old female who presents to clinic today for the following health issues:    [facility]       Hospital Follow-up Visit:    Hospital/Nursing Home/IP Rehab Facility: [facility]  Date of Admission: 5/7/19  Date of Discharge: 5/15/19  Reason(s) for Admission: SOB/respiratory failure    The patient was admitted to the hospital for acute hypoxic respiratory failure to chronic obstructive pulmonary disease exacerbation.  The patient was placed on antibiotics and prednisone.   Needs repeat chest xray in 6 weeks.   She reports her breathing is much improved.    The patient had severe mouth sores and had received valtrex.  The patient has been able to drink fluids. The sores are just on the lips and not in the mouth.  No fevers or chills.     Her blood pressure had been low outside of the hospital. The lasix was held.  Her blood pressure has improved.  She does not feel lightheaded.             Problems taking medications regularly:  None       Medication changes since discharge: added Breo inhaler, valtrex & zovirax ointment       Problems adhering to non-medication therapy:  None    Summary of hospitalization:  [facility] discharge summary reviewed  Diagnostic Tests/Treatments reviewed.  Follow up needed: none  Other Healthcare Providers Involved in Patient s Care:         Homecare  Update since discharge: improved.     Post Discharge Medication Reconciliation: discharge medications reconciled, continue medications without change.  Plan of care communicated with patient     Coding guidelines for this visit:  Type of Medical   Decision Making Face-to-Face Visit       within 7 Days of discharge Face-to-Face Visit        within 14 days of discharge   Moderate Complexity [id number]   High Complexity [id number]              Patient Active Problem List   Diagnosis     Acute and chronic respiratory failure with hypercapnia (HCC)     Nonischemic cardiomyopathy (H)     Pneumonia      Acute myocardial infarction, initial episode of care (HCC)     CLL (chronic lymphocytic leukemia) (HCC)     CHF (congestive heart failure) (HCC)     Cardiomyopathy in other diseases classified elsewhere (HCC)     Hyperlipidemia with target LDL less than 130     Health Care Home     COPD exacerbation (H)     LESLY (generalized anxiety disorder)     Hypothyroidism     Back pain with radiation     DDD (degenerative disc disease), lumbar     Splenomegaly     Lumbar degenerative disc disease     Lumbar foraminal stenosis     Central spinal stenosis     Bladder cancer (H)     Sepsis (H)     Senile osteoporosis     CKD (chronic kidney disease) stage 3, GFR 30-59 ml/min (H)     Purpura senilis (H)     Xerosis cutis     Malignant neoplasm of urinary bladder, unspecified site (H)     Hypoxia     Femoral neck fracture (H)     S/P total hip arthroplasty     NSTEMI (non-ST elevated myocardial infarction) (H)     Stress-induced cardiomyopathy     COPD with acute exacerbation (H)     Past Surgical History:   Procedure Laterality Date     BIOPSY LYMPH NODE INGUINAL Right 10/23/2018    Procedure: excsional biopsy right inguinal lymph node;  Surgeon: Reji Connors MD;  Location: [facility] OR     BLADDER SURGERY       CORONARY ANGIOGRAPHY ADULT ORDER  10/2014    minimal CAD     CV LEFT HEART CATH N/A 12/11/2018    Procedure: Left Heart Cath;  Surgeon: Sadiq Carrasco MD;  Location:  HEART CARDIAC CATH LAB     CYSTOSCOPY       CYSTOSCOPY, BIOPSY BLADDER, COMBINED N/A 2/9/2016    Procedure: COMBINED CYSTOSCOPY, BIOPSY BLADDER;  Surgeon: Kar Nuñez MD;  Location:  OR     CYSTOSCOPY, TRANSURETHRAL RESECTION (TUR) TUMOR BLADDER, COMBINED N/A 2/9/2016    Procedure: COMBINED CYSTOSCOPY, TRANSURETHRAL RESECTION (TUR) TUMOR BLADDER;  Surgeon: Kar Nuñez MD;  Location:  OR     ESOPHAGOSCOPY, GASTROSCOPY, DUODENOSCOPY (EGD), COMBINED N/A 6/22/2016    Procedure: COMBINED ESOPHAGOSCOPY, GASTROSCOPY, DUODENOSCOPY (EGD);  " Surgeon: Freddie Diez MD;  Location: [facility] GI     OPEN REDUCTION INTERNAL FIXATION HIP BIPOLAR Left 2018    Procedure: Left bipolar hemiarthroplasty;  Surgeon: Scar Reynolds MD;  Location: [facility] OR       Social History     Tobacco Use     Smoking status: Former Smoker     Types: Cigarettes     Last attempt to quit: 2/3/1996     Years since quittin.3     Smokeless tobacco: Never Used   Substance Use Topics     Alcohol use: No     Alcohol/week: 0.0 oz     Family History   Problem Relation Age of Onset     Cerebrovascular Disease Mother      Cancer Father            Reviewed and updated as needed this visit by Provider         Review of Systems   ROS COMP: CONSTITUTIONAL: NEGATIVE for fever, chills, change in weight  ENT/MOUTH: POS crusted cold sores on lips  RESP: NEGATIVE for significant cough or SOB  CV: NEGATIVE for chest pain, palpitations or peripheral edema      Objective    /58   Pulse 71   Temp 98  F (36.7  C) (Oral)   Resp 16   Ht 1.499 m (4' 11\")   Wt 45.3 kg (99 lb 12.8 oz)   SpO2 95%   Breastfeeding? No   BMI 20.16 kg/m    Body mass index is 20.16 kg/m .  Physical Exam   GENERAL: healthy, alert and no distress  HEENT: crusted cold sores on the lips  RESP: lungs clear to auscultation - no rales, rhonchi or wheezes  CV: regular rate and rhythm, normal S1 S2, no S3 or S4, no murmur, click or rub          Assessment & Plan       ICD-10-CM    1. H/O: pneumonia Z87.01 XR Chest 2 Views   2. Cold sore B00.1 valACYclovir (VALTREX) 1000 mg tablet   3. Systolic congestive heart failure, unspecified HF chronicity (H) I50.20       hold lasix for now while decreased intake  Patient to monitor for resumption of lasix    See Patient Instructions    Return in about 3 months (around 2019) for Routine Visit.      Piper Kiser MD  [facility]      "

## 2019-05-20 NOTE — PROGRESS NOTES
Clinic Care Coordination Contact  Care Coordination Communication  Patient hospitalized 5/7/19-5/15/19 for COPD exacerbation.  RN CC followed up with patient on 5/16.  Patient was having a difficult time caring for self.  Referrals made to Community Paramedic and home care.    Patient has had initial visits from both and now enrolled.     Home Care Contact:              Home Care Agency: [facility]               Contact name () and phone number: JEROMY Rosenthal [phone]              Care Coordination contacted home care: No              Start of care date: 5/19/19    Patient Contact:              Left message for patient.  Left reminder of PCP f/u today at 1100.    Plan: RN/SW Care Coordinator will await notification from home care staff informing RN/SW Care Coordinator of patients discharge plans/needs. RN/SW Care Coordinator will review chart and outreach to home care every 4 weeks and as needed.      Babs Garcia RN  Care Coordination  Phone:  [phone]  Email: [email or web address]

## 2019-05-22 ENCOUNTER — TRANSFERRED RECORDS (OUTPATIENT)
Dept: HEALTH INFORMATION MANAGEMENT | Facility: CLINIC | Age: 84
End: 2019-05-22

## 2019-05-24 DIAGNOSIS — E03.8 OTHER SPECIFIED HYPOTHYROIDISM: ICD-10-CM

## 2019-05-24 DIAGNOSIS — E78.5 HYPERLIPIDEMIA WITH TARGET LDL LESS THAN 130: ICD-10-CM

## 2019-05-24 DIAGNOSIS — I24.9 ACS (ACUTE CORONARY SYNDROME) (H): ICD-10-CM

## 2019-05-24 RX ORDER — ATORVASTATIN CALCIUM 20 MG/1
TABLET, FILM COATED ORAL
Qty: 90 TABLET | Refills: 0 | Status: SHIPPED | OUTPATIENT
Start: 2019-05-24 | End: 2019-06-25

## 2019-05-24 RX ORDER — LEVOTHYROXINE SODIUM 75 UG/1
TABLET ORAL
Qty: 90 TABLET | Refills: 0 | Status: SHIPPED | OUTPATIENT
Start: 2019-05-24 | End: 2019-06-25

## 2019-05-24 NOTE — TELEPHONE ENCOUNTER
Levothyroxine & atorvastatin  Prescription approved per [facility] Refill Protocol.    Per 5/20/19 office visit note, patient due for visit in August 2019

## 2019-05-24 NOTE — TELEPHONE ENCOUNTER
"Requested Prescriptions   Pending Prescriptions Disp Refills     levothyroxine (SYNTHROID/LEVOTHROID) 75 MCG tablet [Pharmacy Med Name:  Last Written Prescription Date:  11/6/2018  Last Fill Quantity: 90,  # refills: 1   Last office visit: 5/20/2019 with prescribing provider:     Future Office Visit:   Levothyroxine Sodium Oral Tablet 75 MCG] 90 tablet 0     Sig: TAKE ONE TABLET BY MOUTH ONE TIME DAILY       Thyroid Protocol Passed - 5/24/2019 10:26 AM        Passed - Patient is 12 years or older        Passed - Recent (12 mo) or future (30 days) visit within the authorizing provider's specialty     Patient had office visit in the last 12 months or has a visit in the next 30 days with authorizing provider or within the authorizing provider's specialty.  See \"Patient Info\" tab in inbasket, or \"Choose Columns\" in Meds & Orders section of the refill encounter.              Passed - Medication is active on med list        Passed - Normal TSH on file in past 12 months     Recent Labs   Lab Test 05/01/19  1144   TSH 0.46              Passed - No active pregnancy on record     If patient is pregnant or has had a positive pregnancy test, please check TSH.          Passed - No positive pregnancy test in past 12 months     If patient is pregnant or has had a positive pregnancy test, please check TSH.          atorvastatin (LIPITOR) 20 MG tablet [Pharmacy Med Name: Atorvastatin Calcium  Last Written Prescription Date:  10/18/2018  Last Fill Quantity: 90,  # refills: 1   Last office visit: 5/20/2019 with prescribing provider:     Future Office Visit:   Oral Tablet 20 MG] 90 tablet 0     Sig: TAKE ONE TABLET BY MOUTH ONE TIME DAILY       Statins Protocol Passed - 5/24/2019 10:26 AM        Passed - LDL on file in past 12 months     Recent Labs   Lab Test 05/01/19  1144   LDL 84             Passed - No abnormal creatine kinase in past 12 months     No lab results found.             Passed - Recent (12 mo) or future (30 days) visit " "within the authorizing provider's specialty     Patient had office visit in the last 12 months or has a visit in the next 30 days with authorizing provider or within the authorizing provider's specialty.  See \"Patient Info\" tab in inbasket, or \"Choose Columns\" in Meds & Orders section of the refill encounter.              Passed - Medication is active on med list        Passed - Patient is age 18 or older        Passed - No active pregnancy on record        Passed - No positive pregnancy test in past 12 months        "

## 2019-06-04 ENCOUNTER — ALLIED HEALTH/NURSE VISIT (OUTPATIENT)
Dept: PHARMACY | Facility: CLINIC | Age: 84
End: 2019-06-04
Payer: COMMERCIAL

## 2019-06-04 DIAGNOSIS — E78.5 HYPERLIPIDEMIA WITH TARGET LDL LESS THAN 130: ICD-10-CM

## 2019-06-04 DIAGNOSIS — D64.9 ANEMIA, UNSPECIFIED TYPE: ICD-10-CM

## 2019-06-04 DIAGNOSIS — I10 ESSENTIAL HYPERTENSION WITH GOAL BLOOD PRESSURE LESS THAN 140/90: ICD-10-CM

## 2019-06-04 DIAGNOSIS — M81.0 SENILE OSTEOPOROSIS: ICD-10-CM

## 2019-06-04 DIAGNOSIS — G47.00 INSOMNIA, UNSPECIFIED TYPE: Primary | ICD-10-CM

## 2019-06-04 DIAGNOSIS — C91.10 CLL (CHRONIC LYMPHOCYTIC LEUKEMIA) (H): ICD-10-CM

## 2019-06-04 DIAGNOSIS — S72.002A FRACTURE OF HIP, LEFT, CLOSED, INITIAL ENCOUNTER (H): ICD-10-CM

## 2019-06-04 DIAGNOSIS — J44.1 COPD EXACERBATION (H): ICD-10-CM

## 2019-06-04 DIAGNOSIS — E03.9 HYPOTHYROIDISM, UNSPECIFIED TYPE: ICD-10-CM

## 2019-06-04 DIAGNOSIS — B00.9 HERPES SIMPLEX VIRUS INFECTION: ICD-10-CM

## 2019-06-04 PROCEDURE — 99606 MTMS BY PHARM EST 15 MIN: CPT | Performed by: PHARMACIST

## 2019-06-04 PROCEDURE — 99607 MTMS BY PHARM ADDL 15 MIN: CPT | Performed by: PHARMACIST

## 2019-06-04 NOTE — PROGRESS NOTES
SUBJECTIVE/OBJECTIVE:                Marie Kline is a 86 year old female called for a follow-up transitions of care visit.  She was discharged from [facility] on 5/15/19 for COPD exacerbation.      Chief Complaint: Last MTM on 4/16/19.  Questions about Dulera daily use vs as needed.  Allergies/ADRs: Reviewed in Epic  Tobacco: No tobacco use  Alcohol: not currently using  Caffeine: decaff coffee   PMH: Reviewed in Epic - [facility] (f/u with [facility])    Medication Adherence: sets up own pill box      Insomnia: Current medications include: trazodone 100 mg every night.  Reports sleep has been much better.  No morning grogginess or side effects.     Anemia: Taking ferrous sulfate 325 mg once daily.  No concerns with side effects.  Hemoglobin   Date Value Ref Range Status   05/08/2019 9.9 (L) 11.7 - 15.7 g/dL Final     No results found for: VISH, IRON, FEB     Herpes:  Using Aquaphor as needed.  Completed course of Zovirax and Valtrex 1000 mg BID.  Had cold sores in nose, on lips and nose.  Reports improvement in symptoms.    COPD: Current medications: Albuterol MDI as needed (not needed daily) and albuterol Nebs BID (not needed recently), Dulera 2 puffs BID (gets through prescription assistance program, has not been able to afford other therapies).   Oxygen dropped down to 84% when she was walking down the juares yesterday; being assessed for portable oxygen for trip to [place] later this month. Using oxygen at home as needed.  Pt is not experiencing side effects.   Pt reports the following symptoms: none at this time  Pt does not have an COPD Action Plan on file.   Has spirometry been completed: Yes   Followed by Dr. Courtney.    Osteoporosis: Current therapy includes: calcium once daily (she wasn't sure if she takes this more than once daily).   Refusing to have Prolia again - believes this caused her teeth to break off.    Has been recommended to stop Prolia by Oncologist.  Pt is getting the following sources of dietary  calcium: milk and cheese  DEXA History: -3.5 from 5/1/19.     Leukemia:  Having infusions every 4 months.  Reports most recent labs, WBC has drastically improved.  Followed by [facility].    Hypothyroidism: Patient is taking levothyroxine 75 mcg daily. Patient is having the following symptoms: none.   TSH   Date Value Ref Range Status   05/01/2019 0.46 0.40 - 4.00 mU/L Final     T4 Free   Date Value Ref Range Status   12/09/2018 1.15 0.76 - 1.46 ng/dL Final       Pain:  Taking Tramadol as needed for pain following hip fracture, APAP as needed and gabapentin 300 mg HS.  Not needing the tramadol or APAP often.  No concerns with side effects.  Serum creatinine: 0.76 mg/dL 05/13/19 0644  Estimated creatinine clearance: 37.3 mL/min    Hyperlipidemia: Current therapy includes atorvastatin 20 mg once daily.  Pt reports no significant myalgias or other side effects.     Recent Labs   Lab Test 05/01/19  1144 03/12/18  1009  10/09/14  0350   CHOL 161 151   < > 167   HDL 45* 47*   < > 55   LDL 84 84   < > 88   TRIG 160* 98   < > 121   CHOLHDLRATIO  --   --   --  3.0    < > = values in this interval not displayed.       Hypertension: Current medications include metoprolol XL 25 mg daily.  Lisinopril and furosemide were discontinued.  Patient does not self-monitor BP.  Patient reports no current medication side effects.  No concerns with swelling since stopping the furosemide.    Current labs include:  BP Readings from Last 3 Encounters:   05/20/19 108/58   05/17/19 (!) 80/52   05/15/19 124/62           Today's Vitals: phone visit    GFR Estimate   Date Value Ref Range Status   05/13/2019 71 >60 mL/min/[1.73_m2] Final     Comment:     Non  GFR Calc  Starting 12/18/2018, serum creatinine based estimated GFR (eGFR) will be   calculated using the Chronic Kidney Disease Epidemiology Collaboration   (CKD-EPI) equation.     05/10/2019 77 >60 mL/min/[1.73_m2] Final     Comment:     Non  GFR  Calc  Starting 12/18/2018, serum creatinine based estimated GFR (eGFR) will be   calculated using the Chronic Kidney Disease Epidemiology Collaboration   (CKD-EPI) equation.     05/08/2019 57 (L) >60 mL/min/[1.73_m2] Final     Comment:     Non  GFR Calc  Starting 12/18/2018, serum creatinine based estimated GFR (eGFR) will be   calculated using the Chronic Kidney Disease Epidemiology Collaboration   (CKD-EPI) equation.       Estimated Creatinine Clearance: 37.3 mL/min (based on SCr of 0.76 mg/dL).    Most Recent Immunizations   Administered Date(s) Administered     HepA-Adult 04/08/2009     HepB-Adult 04/08/2009     Influenza (High Dose) 3 valent vaccine 09/21/2017     Influenza (IIV3) PF 09/24/2014     Influenza (intradermal) 09/24/2013     Mantoux Tuberculin Skin Test 02/22/2016     Pneumo Conj 13-V (2010&after) 09/22/2015     Pneumococcal 23 valent 09/24/2013     Tdap (Adacel,Boostrix) 07/10/2014     Tdap (Adult) Unspecified Formulation 07/10/2014       ASSESSMENT:                             Current medications were reviewed today.   Post Discharge Medication Reconciliation Status: discharge medications reconciled and changed, per note/orders (see AVS).      Medication Adherence: no issues identified    Insomnia: stable    Anemia: Hgb below goal.  Follow-up by Oncology.      Herpes:  stable    COPD: recommend Dulera twice daily as prescribed     Osteoporosis: needs improvement.  Patient refused Prolia due to side effects.  Consider Reclast infusion since renal function has improved.    Leukemia:  Continue to follow-up with Oncology.    Hypothyroidism: stable    Pain:  Stable    Hyperlipidemia: stable    Hypertension: stable      PLAN:                            1.  Dulera 2 puffs twice every day   2. Will discuss DEXA results with Dr. Kiser - patient refusing Prolia        I spent 30 minutes with this patient today. I offer these suggestions for consideration by cc'd chart to the PCP. A copy  of the visit note was provided to the patient's primary care provider.    Will follow up in 2 months      Chandler Perdue D  [phone] (phone)  [phone] (pager)  Medication Therapy Management Pharmacist

## 2019-06-07 ENCOUNTER — TELEPHONE (OUTPATIENT)
Dept: INTERNAL MEDICINE | Facility: CLINIC | Age: 84
End: 2019-06-07

## 2019-06-07 DIAGNOSIS — Z53.9 DIAGNOSIS NOT YET DEFINED: Primary | ICD-10-CM

## 2019-06-07 PROCEDURE — G0180 MD CERTIFICATION HHA PATIENT: HCPCS | Performed by: INTERNAL MEDICINE

## 2019-06-07 NOTE — TELEPHONE ENCOUNTER
,Fax received from [facility] 05/19/19 for review and signature.  Put in Dr. Kiser's in basket.

## 2019-06-17 ENCOUNTER — MEDICAL CORRESPONDENCE (OUTPATIENT)
Dept: HEALTH INFORMATION MANAGEMENT | Facility: CLINIC | Age: 84
End: 2019-06-17

## 2019-06-18 ENCOUNTER — HOSPITAL ENCOUNTER (INPATIENT)
Facility: CLINIC | Age: 84
LOS: 5 days | Discharge: HOME | DRG: 193 | End: 2019-06-23
Attending: EMERGENCY MEDICINE | Admitting: INTERNAL MEDICINE
Payer: MEDICARE

## 2019-06-18 ENCOUNTER — APPOINTMENT (OUTPATIENT)
Dept: GENERAL RADIOLOGY | Facility: CLINIC | Age: 84
DRG: 193 | End: 2019-06-18
Attending: EMERGENCY MEDICINE
Payer: MEDICARE

## 2019-06-18 ENCOUNTER — TELEPHONE (OUTPATIENT)
Dept: CARE COORDINATION | Facility: CLINIC | Age: 84
End: 2019-06-18

## 2019-06-18 DIAGNOSIS — J15.9 HEALTHCARE ASSOCIATED BACTERIAL PNEUMONIA: ICD-10-CM

## 2019-06-18 DIAGNOSIS — J18.9 PNEUMONIA OF LEFT LUNG DUE TO INFECTIOUS ORGANISM, UNSPECIFIED PART OF LUNG: Primary | ICD-10-CM

## 2019-06-18 DIAGNOSIS — D72.829 LEUKOCYTOSIS, UNSPECIFIED TYPE: ICD-10-CM

## 2019-06-18 DIAGNOSIS — C91.10 CLL (CHRONIC LYMPHOCYTIC LEUKEMIA) (H): ICD-10-CM

## 2019-06-18 DIAGNOSIS — J44.1 COPD EXACERBATION (H): ICD-10-CM

## 2019-06-18 LAB
ALBUMIN UR-MCNC: NEGATIVE MG/DL
ANION GAP SERPL CALCULATED.3IONS-SCNC: 4 MMOL/L (ref 3–14)
ANISOCYTOSIS BLD QL SMEAR: ABNORMAL
APPEARANCE UR: CLEAR
BASE EXCESS BLDV CALC-SCNC: 5.6 MMOL/L
BASOPHILS # BLD AUTO: 0 10E9/L (ref 0–0.2)
BASOPHILS NFR BLD AUTO: 0 %
BILIRUB UR QL STRIP: NEGATIVE
BUN SERPL-MCNC: 7 MG/DL (ref 7–30)
CALCIUM SERPL-MCNC: 8.4 MG/DL (ref 8.5–10.1)
CHLORIDE SERPL-SCNC: 100 MMOL/L (ref 94–109)
CO2 SERPL-SCNC: 30 MMOL/L (ref 20–32)
COLOR UR AUTO: ABNORMAL
CREAT SERPL-MCNC: 0.77 MG/DL (ref 0.52–1.04)
CREAT SERPL-MCNC: 0.77 MG/DL (ref 0.52–1.04)
DIFFERENTIAL METHOD BLD: ABNORMAL
ELLIPTOCYTES BLD QL SMEAR: SLIGHT
EOSINOPHIL # BLD AUTO: 0 10E9/L (ref 0–0.7)
EOSINOPHIL NFR BLD AUTO: 0 %
ERYTHROCYTE [DISTWIDTH] IN BLOOD BY AUTOMATED COUNT: 18.2 % (ref 10–15)
GFR SERPL CREATININE-BSD FRML MDRD: 69 ML/MIN/{1.73_M2}
GFR SERPL CREATININE-BSD FRML MDRD: 69 ML/MIN/{1.73_M2}
GLUCOSE SERPL-MCNC: 127 MG/DL (ref 70–99)
GLUCOSE UR STRIP-MCNC: NEGATIVE MG/DL
HCO3 BLDV-SCNC: 31 MMOL/L (ref 21–28)
HCT VFR BLD AUTO: 37.7 % (ref 35–47)
HGB BLD-MCNC: 11.5 G/DL (ref 11.7–15.7)
HGB UR QL STRIP: NEGATIVE
HYPOCHROMIA BLD QL: PRESENT
INTERPRETATION ECG - MUSE: NORMAL
KETONES UR STRIP-MCNC: NEGATIVE MG/DL
LACTATE BLD-SCNC: 0.6 MMOL/L (ref 0.7–2)
LEUKOCYTE ESTERASE UR QL STRIP: NEGATIVE
LYMPHOCYTES # BLD AUTO: 99.4 10E9/L (ref 0.8–5.3)
LYMPHOCYTES NFR BLD AUTO: 92 %
MACROCYTES BLD QL SMEAR: PRESENT
MCH RBC QN AUTO: 28.8 PG (ref 26.5–33)
MCHC RBC AUTO-ENTMCNC: 30.5 G/DL (ref 31.5–36.5)
MCV RBC AUTO: 94 FL (ref 78–100)
MONOCYTES # BLD AUTO: 1.1 10E9/L (ref 0–1.3)
MONOCYTES NFR BLD AUTO: 1 %
MUCOUS THREADS #/AREA URNS LPF: PRESENT /LPF
NEUTROPHILS # BLD AUTO: 7.6 10E9/L (ref 1.6–8.3)
NEUTROPHILS NFR BLD AUTO: 7 %
NITRATE UR QL: NEGATIVE
NT-PROBNP SERPL-MCNC: 1500 PG/ML (ref 0–1800)
O2/TOTAL GAS SETTING VFR VENT: ABNORMAL %
OXYHGB MFR BLDV: 60 %
PCO2 BLDV: 48 MM HG (ref 40–50)
PH BLDV: 7.42 PH (ref 7.32–7.43)
PH UR STRIP: 7 PH (ref 5–7)
PLATELET # BLD AUTO: 132 10E9/L (ref 150–450)
PLATELET # BLD AUTO: 159 10E9/L (ref 150–450)
PLATELET # BLD EST: ABNORMAL 10*3/UL
PO2 BLDV: 34 MM HG (ref 25–47)
POTASSIUM SERPL-SCNC: 4.6 MMOL/L (ref 3.4–5.3)
RBC # BLD AUTO: 4 10E12/L (ref 3.8–5.2)
RBC #/AREA URNS AUTO: 1 /HPF (ref 0–2)
RBC INCLUSIONS BLD: SLIGHT
SMUDGE CELLS BLD QL SMEAR: PRESENT
SODIUM SERPL-SCNC: 134 MMOL/L (ref 133–144)
SOURCE: ABNORMAL
SP GR UR STRIP: 1.01 (ref 1–1.03)
SQUAMOUS #/AREA URNS AUTO: <1 /HPF (ref 0–1)
UROBILINOGEN UR STRIP-MCNC: NORMAL MG/DL (ref 0–2)
WBC # BLD AUTO: 108 10E9/L (ref 4–11)
WBC #/AREA URNS AUTO: <1 /HPF (ref 0–5)

## 2019-06-18 PROCEDURE — 12000000 ZZH R&B MED SURG/OB

## 2019-06-18 PROCEDURE — 25000132 ZZH RX MED GY IP 250 OP 250 PS 637: Mod: GY | Performed by: INTERNAL MEDICINE

## 2019-06-18 PROCEDURE — 99221 1ST HOSP IP/OBS SF/LOW 40: CPT | Mod: AI | Performed by: INTERNAL MEDICINE

## 2019-06-18 PROCEDURE — 85049 AUTOMATED PLATELET COUNT: CPT | Performed by: INTERNAL MEDICINE

## 2019-06-18 PROCEDURE — 25000128 H RX IP 250 OP 636: Performed by: INTERNAL MEDICINE

## 2019-06-18 PROCEDURE — 25000128 H RX IP 250 OP 636: Performed by: EMERGENCY MEDICINE

## 2019-06-18 PROCEDURE — 25000125 ZZHC RX 250: Performed by: EMERGENCY MEDICINE

## 2019-06-18 PROCEDURE — 82565 ASSAY OF CREATININE: CPT | Performed by: INTERNAL MEDICINE

## 2019-06-18 PROCEDURE — 83605 ASSAY OF LACTIC ACID: CPT | Performed by: INTERNAL MEDICINE

## 2019-06-18 PROCEDURE — 83880 ASSAY OF NATRIURETIC PEPTIDE: CPT | Performed by: EMERGENCY MEDICINE

## 2019-06-18 PROCEDURE — 40000274 ZZH STATISTIC RCP CONSULT EA 30 MIN

## 2019-06-18 PROCEDURE — 99222 1ST HOSP IP/OBS MODERATE 55: CPT | Performed by: NURSE PRACTITIONER

## 2019-06-18 PROCEDURE — 36415 COLL VENOUS BLD VENIPUNCTURE: CPT | Performed by: EMERGENCY MEDICINE

## 2019-06-18 PROCEDURE — 87040 BLOOD CULTURE FOR BACTERIA: CPT | Performed by: EMERGENCY MEDICINE

## 2019-06-18 PROCEDURE — 40000275 ZZH STATISTIC RCP TIME EA 10 MIN

## 2019-06-18 PROCEDURE — 36415 COLL VENOUS BLD VENIPUNCTURE: CPT | Performed by: INTERNAL MEDICINE

## 2019-06-18 PROCEDURE — 96365 THER/PROPH/DIAG IV INF INIT: CPT

## 2019-06-18 PROCEDURE — A9270 NON-COVERED ITEM OR SERVICE: HCPCS | Mod: GY | Performed by: INTERNAL MEDICINE

## 2019-06-18 PROCEDURE — 99285 EMERGENCY DEPT VISIT HI MDM: CPT | Mod: 25

## 2019-06-18 PROCEDURE — 93005 ELECTROCARDIOGRAM TRACING: CPT

## 2019-06-18 PROCEDURE — 81001 URINALYSIS AUTO W/SCOPE: CPT | Performed by: EMERGENCY MEDICINE

## 2019-06-18 PROCEDURE — 99207 ZZC CDG-HISTORY COMP: MEETS EXP. PROB FOCUSED- DOWN CODED LACK OF PFSH: CPT | Performed by: INTERNAL MEDICINE

## 2019-06-18 PROCEDURE — 25000132 ZZH RX MED GY IP 250 OP 250 PS 637: Mod: GY | Performed by: NURSE PRACTITIONER

## 2019-06-18 PROCEDURE — 80048 BASIC METABOLIC PNL TOTAL CA: CPT | Performed by: EMERGENCY MEDICINE

## 2019-06-18 PROCEDURE — 82805 BLOOD GASES W/O2 SATURATION: CPT | Performed by: EMERGENCY MEDICINE

## 2019-06-18 PROCEDURE — 25800030 ZZH RX IP 258 OP 636: Performed by: EMERGENCY MEDICINE

## 2019-06-18 PROCEDURE — 71046 X-RAY EXAM CHEST 2 VIEWS: CPT

## 2019-06-18 PROCEDURE — 94640 AIRWAY INHALATION TREATMENT: CPT

## 2019-06-18 PROCEDURE — 96375 TX/PRO/DX INJ NEW DRUG ADDON: CPT

## 2019-06-18 PROCEDURE — 85025 COMPLETE CBC W/AUTO DIFF WBC: CPT | Performed by: EMERGENCY MEDICINE

## 2019-06-18 RX ORDER — ALPRAZOLAM 0.25 MG
0.25 TABLET ORAL 2 TIMES DAILY PRN
Status: DISCONTINUED | OUTPATIENT
Start: 2019-06-18 | End: 2019-06-23 | Stop reason: HOSPADM

## 2019-06-18 RX ORDER — ACETAMINOPHEN 325 MG/1
650 TABLET ORAL EVERY 4 HOURS PRN
Status: DISCONTINUED | OUTPATIENT
Start: 2019-06-18 | End: 2019-06-23 | Stop reason: HOSPADM

## 2019-06-18 RX ORDER — TRAZODONE HYDROCHLORIDE 50 MG/1
100 TABLET, FILM COATED ORAL AT BEDTIME
COMMUNITY
End: 2019-07-08

## 2019-06-18 RX ORDER — IPRATROPIUM BROMIDE AND ALBUTEROL SULFATE 2.5; .5 MG/3ML; MG/3ML
3 SOLUTION RESPIRATORY (INHALATION)
Status: DISPENSED | OUTPATIENT
Start: 2019-06-18 | End: 2019-06-18

## 2019-06-18 RX ORDER — LIDOCAINE 4 G/G
1 PATCH TOPICAL
Status: DISCONTINUED | OUTPATIENT
Start: 2019-06-18 | End: 2019-06-23 | Stop reason: HOSPADM

## 2019-06-18 RX ORDER — TRAMADOL HYDROCHLORIDE 50 MG/1
50 TABLET ORAL EVERY 6 HOURS PRN
Status: DISCONTINUED | OUTPATIENT
Start: 2019-06-18 | End: 2019-06-23 | Stop reason: HOSPADM

## 2019-06-18 RX ORDER — LEVOTHYROXINE SODIUM 75 UG/1
75 TABLET ORAL EVERY EVENING
Status: DISCONTINUED | OUTPATIENT
Start: 2019-06-18 | End: 2019-06-23 | Stop reason: HOSPADM

## 2019-06-18 RX ORDER — NALOXONE HYDROCHLORIDE 0.4 MG/ML
.1-.4 INJECTION, SOLUTION INTRAMUSCULAR; INTRAVENOUS; SUBCUTANEOUS
Status: DISCONTINUED | OUTPATIENT
Start: 2019-06-18 | End: 2019-06-23 | Stop reason: HOSPADM

## 2019-06-18 RX ORDER — AMOXICILLIN 250 MG
1 CAPSULE ORAL 2 TIMES DAILY
Status: DISCONTINUED | OUTPATIENT
Start: 2019-06-18 | End: 2019-06-23 | Stop reason: HOSPADM

## 2019-06-18 RX ORDER — SERTRALINE HYDROCHLORIDE 25 MG/1
25 TABLET, FILM COATED ORAL DAILY
Status: DISCONTINUED | OUTPATIENT
Start: 2019-06-18 | End: 2019-06-18

## 2019-06-18 RX ORDER — ONDANSETRON 2 MG/ML
4 INJECTION INTRAMUSCULAR; INTRAVENOUS EVERY 6 HOURS PRN
Status: DISCONTINUED | OUTPATIENT
Start: 2019-06-18 | End: 2019-06-23 | Stop reason: HOSPADM

## 2019-06-18 RX ORDER — AMOXICILLIN 250 MG
2 CAPSULE ORAL 2 TIMES DAILY
Status: DISCONTINUED | OUTPATIENT
Start: 2019-06-18 | End: 2019-06-23 | Stop reason: HOSPADM

## 2019-06-18 RX ORDER — METHYLPREDNISOLONE SODIUM SUCCINATE 40 MG/ML
40 INJECTION, POWDER, LYOPHILIZED, FOR SOLUTION INTRAMUSCULAR; INTRAVENOUS EVERY 12 HOURS
Status: DISCONTINUED | OUTPATIENT
Start: 2019-06-18 | End: 2019-06-20

## 2019-06-18 RX ORDER — TRAZODONE HYDROCHLORIDE 100 MG/1
100 TABLET ORAL AT BEDTIME
Status: DISCONTINUED | OUTPATIENT
Start: 2019-06-18 | End: 2019-06-23 | Stop reason: HOSPADM

## 2019-06-18 RX ORDER — PROCHLORPERAZINE MALEATE 5 MG
5 TABLET ORAL EVERY 6 HOURS PRN
Status: DISCONTINUED | OUTPATIENT
Start: 2019-06-18 | End: 2019-06-23 | Stop reason: HOSPADM

## 2019-06-18 RX ORDER — METHYLPREDNISOLONE SODIUM SUCCINATE 125 MG/2ML
125 INJECTION, POWDER, LYOPHILIZED, FOR SOLUTION INTRAMUSCULAR; INTRAVENOUS ONCE
Status: COMPLETED | OUTPATIENT
Start: 2019-06-18 | End: 2019-06-18

## 2019-06-18 RX ORDER — ALBUTEROL SULFATE 90 UG/1
2 AEROSOL, METERED RESPIRATORY (INHALATION) EVERY 6 HOURS PRN
Status: DISCONTINUED | OUTPATIENT
Start: 2019-06-18 | End: 2019-06-23 | Stop reason: HOSPADM

## 2019-06-18 RX ORDER — ALBUTEROL SULFATE 0.83 MG/ML
2.5 SOLUTION RESPIRATORY (INHALATION) EVERY 6 HOURS PRN
Status: DISCONTINUED | OUTPATIENT
Start: 2019-06-18 | End: 2019-06-23 | Stop reason: HOSPADM

## 2019-06-18 RX ORDER — VANCOMYCIN HYDROCHLORIDE 1 G/200ML
1000 INJECTION, SOLUTION INTRAVENOUS ONCE
Status: COMPLETED | OUTPATIENT
Start: 2019-06-18 | End: 2019-06-18

## 2019-06-18 RX ORDER — IPRATROPIUM BROMIDE AND ALBUTEROL SULFATE 2.5; .5 MG/3ML; MG/3ML
3 SOLUTION RESPIRATORY (INHALATION)
Status: DISCONTINUED | OUTPATIENT
Start: 2019-06-18 | End: 2019-06-23 | Stop reason: HOSPADM

## 2019-06-18 RX ORDER — IPRATROPIUM BROMIDE AND ALBUTEROL SULFATE 2.5; .5 MG/3ML; MG/3ML
3 SOLUTION RESPIRATORY (INHALATION) EVERY 4 HOURS PRN
Status: DISCONTINUED | OUTPATIENT
Start: 2019-06-18 | End: 2019-06-18

## 2019-06-18 RX ORDER — AZITHROMYCIN 250 MG/1
250 TABLET, FILM COATED ORAL DAILY
Status: COMPLETED | OUTPATIENT
Start: 2019-06-19 | End: 2019-06-22

## 2019-06-18 RX ORDER — AMOXICILLIN 250 MG
2 CAPSULE ORAL 2 TIMES DAILY PRN
Status: DISCONTINUED | OUTPATIENT
Start: 2019-06-18 | End: 2019-06-23 | Stop reason: HOSPADM

## 2019-06-18 RX ORDER — ONDANSETRON 4 MG/1
4 TABLET, ORALLY DISINTEGRATING ORAL EVERY 6 HOURS PRN
Status: DISCONTINUED | OUTPATIENT
Start: 2019-06-18 | End: 2019-06-23 | Stop reason: HOSPADM

## 2019-06-18 RX ORDER — AMOXICILLIN 250 MG
1 CAPSULE ORAL 2 TIMES DAILY PRN
Status: DISCONTINUED | OUTPATIENT
Start: 2019-06-18 | End: 2019-06-23 | Stop reason: HOSPADM

## 2019-06-18 RX ORDER — ATORVASTATIN CALCIUM 20 MG/1
20 TABLET, FILM COATED ORAL DAILY
Status: DISCONTINUED | OUTPATIENT
Start: 2019-06-18 | End: 2019-06-23 | Stop reason: HOSPADM

## 2019-06-18 RX ORDER — PROCHLORPERAZINE 25 MG
12.5 SUPPOSITORY, RECTAL RECTAL EVERY 12 HOURS PRN
Status: DISCONTINUED | OUTPATIENT
Start: 2019-06-18 | End: 2019-06-23 | Stop reason: HOSPADM

## 2019-06-18 RX ADMIN — AZITHROMYCIN MONOHYDRATE 500 MG: 500 INJECTION, POWDER, LYOPHILIZED, FOR SOLUTION INTRAVENOUS at 04:51

## 2019-06-18 RX ADMIN — LIDOCAINE 1 PATCH: 560 PATCH PERCUTANEOUS; TOPICAL; TRANSDERMAL at 21:13

## 2019-06-18 RX ADMIN — TRAZODONE HYDROCHLORIDE 100 MG: 100 TABLET ORAL at 22:22

## 2019-06-18 RX ADMIN — LEVOTHYROXINE SODIUM 75 MCG: 75 TABLET ORAL at 21:14

## 2019-06-18 RX ADMIN — CEFEPIME HYDROCHLORIDE 2 G: 2 INJECTION, POWDER, FOR SOLUTION INTRAVENOUS at 04:06

## 2019-06-18 RX ADMIN — IPRATROPIUM BROMIDE AND ALBUTEROL SULFATE 3 ML: .5; 3 SOLUTION RESPIRATORY (INHALATION) at 02:43

## 2019-06-18 RX ADMIN — METHYLPREDNISOLONE SODIUM SUCCINATE 125 MG: 125 INJECTION, POWDER, FOR SOLUTION INTRAMUSCULAR; INTRAVENOUS at 02:46

## 2019-06-18 RX ADMIN — ATORVASTATIN CALCIUM 20 MG: 20 TABLET, FILM COATED ORAL at 10:52

## 2019-06-18 RX ADMIN — ASPIRIN 325 MG: 325 TABLET, DELAYED RELEASE ORAL at 21:14

## 2019-06-18 RX ADMIN — TAZOBACTAM SODIUM AND PIPERACILLIN SODIUM 2.25 G: 250; 2 INJECTION, SOLUTION INTRAVENOUS at 06:23

## 2019-06-18 RX ADMIN — TAZOBACTAM SODIUM AND PIPERACILLIN SODIUM 3.38 G: 375; 3 INJECTION, SOLUTION INTRAVENOUS at 12:43

## 2019-06-18 RX ADMIN — ENOXAPARIN SODIUM 40 MG: 40 INJECTION SUBCUTANEOUS at 10:52

## 2019-06-18 RX ADMIN — VANCOMYCIN HYDROCHLORIDE 1000 MG: 1 INJECTION, SOLUTION INTRAVENOUS at 04:50

## 2019-06-18 RX ADMIN — SODIUM CHLORIDE 250 ML: 9 INJECTION, SOLUTION INTRAVENOUS at 03:55

## 2019-06-18 RX ADMIN — TAZOBACTAM SODIUM AND PIPERACILLIN SODIUM 3.38 G: 375; 3 INJECTION, SOLUTION INTRAVENOUS at 18:04

## 2019-06-18 RX ADMIN — METHYLPREDNISOLONE 40 MG: 40 INJECTION, POWDER, LYOPHILIZED, FOR SOLUTION INTRAMUSCULAR; INTRAVENOUS at 17:13

## 2019-06-18 ASSESSMENT — ACTIVITIES OF DAILY LIVING (ADL)
ADLS_ACUITY_SCORE: 17
ADLS_ACUITY_SCORE: 15

## 2019-06-18 ASSESSMENT — ENCOUNTER SYMPTOMS
ABDOMINAL PAIN: 0
FREQUENCY: 0
SHORTNESS OF BREATH: 1
CHEST TIGHTNESS: 1
NAUSEA: 0
CHILLS: 0
DYSURIA: 0
VOMITING: 0
COUGH: 1
DIFFICULTY URINATING: 0

## 2019-06-18 ASSESSMENT — MIFFLIN-ST. JEOR
SCORE: 756.58
SCORE: 771.55

## 2019-06-18 NOTE — TELEPHONE ENCOUNTER
Received call from patient who wanted to inform PCP that she was admitted to [facility] early this morning.  She has been diagnosed with pneumonia.  Anticipate 2-4 day hospital stay.    Patient is enrolled with care coordination and will outreach to patient once she is discharged.    Babs Garcia RN  Care Coordination  Phone:  [phone]  Email: [email or web address]

## 2019-06-18 NOTE — PLAN OF CARE
"Pt up to [facility] around 0500 this morning. Came in d/t SOB. Pt on 3L O2 sats in mid 90s. Pt denies pain. Alert and oriented x4. Denies feeling \"as short of breath\". Denies chest pain. LS clear in upper lobes but fine crackles in lower. Pt has productive cough. Pt triggered sepsis protocol and lactic came back at 0.6. Pt had temp with EMS but temp is stable now at 97.3. Received IV antibiotics in ED and zosyn up on the floor. Skin check complete by second Rn. Skin intact other than blanchable redness to right heel. Pt A1, regular diet. Continue with antibiotics and per POC.   "

## 2019-06-18 NOTE — PROGRESS NOTES
"SPIRITUAL HEALTH SERVICES Progress Note  FR Med Surg 3    Spiritual Health Services visit per routine hospital admission request. Pt daughter stepped out to make a call while I visited briefly with Marie. Pt is known to me from previous hospitalizations.  She recalled our visit and stated that despite her frequent admissions, she remains positive and is confident \"God is great and God is with me\".    Prayer requested and provided for healing and comfort.    Plan: Spiritual Health Services remains available for additional emotional/spiritual support.    Jean Murphy MA  Staff   Pager: [phone]  Phone: [phone]        "

## 2019-06-18 NOTE — PLAN OF CARE
Pleasant.  Lungs decreased with occas fine crackles O2 at 1L  With sats at 90-91 at rest.  Two loose black stools this am. Will monitor  Palliative to see today. Up with SBA only  Calls appropriately

## 2019-06-18 NOTE — H&P
Admitted:     06/18/2019      CHIEF COMPLAINT:  Shortness of breath.      HISTORY OF PRESENT ILLNESS:  Marie Kline is an 87-year-old female with a past medical history significant for chronic obstructive pulmonary disease with home oxygen as needed, percussible cardiomyopathy, hypertension, hyperlipidemia, anxiety, chronic lymphocytic leukemia for which she gets infusions every month.  Has followup with Dr. 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  The patient also stated she has associated chest tightness and mild productive cough of greenish sputum.  The patient used her albuterol nebs twice, which did not help her much.  She tried to use her oxygen and her concentrator. That did not help, did not work for her, and she was brought to the emergency room.        After arrival, her shortness of breath improved.  Chest x-ray showed possibly developing right lower lobe pneumonia and the ED physician, Dr. Dumont, gave the patient vancomycin, Zithromax and cefepime, as the patient was in the hospital recently.  The patient denied any bowel or urinary habit change.  She denied any runny nose, sore throat or headache.      PAST MEDICAL HISTORY:   1.  Chronic respiratory failure due to chronic obstructive pulmonary disease.   2.  Acute renal failure.   3.  Herpes labialis.   4.  Severe malnutrition.   5.  Chronic lymphocytic leukemia followed with Dr. Ivory.   6.  Anxiety.   7.  Chronic kidney disease.   8.  Bladder cancer.   9.  Osteonecrosis of the jaw.   10.  Hypothyroidism.   11.  Hypertension.      PAST SURGICAL HISTORY:  Includes:   1.  Right inguinal lymph node biopsy.   2.  Bladder surgery.   3.  Left heart catheterization.   4.  Bladder biopsy.   5.  Transurethral resection of bladder.   6.  Left hip hemiarthroplasty.      FAMILY  HISTORY:  Reviewed and not significant to the patient's current condition, but includes mother with history of stroke, father with history of unidentified cancer.      ALLERGIES:  CONTRAST DYE AND PROLIA.      HOME MEDICATIONS:  Needs to be reviewed by Pharmacy, include:   Prior to Admission Medications   Prescriptions Last Dose Informant Patient Reported? Taking?   Multiple Vitamins-Minerals (MULTIVITAMIN GUMMIES ADULT PO) 6/17/2019 at Unknown time  Yes Yes   Sig: Take 2 tablets by mouth daily    albuterol (PROAIR HFA/PROVENTIL HFA/VENTOLIN HFA) 108 (90 BASE) MCG/ACT Inhaler Past Month at Unknown time  No Yes   Sig: Inhale 2 puffs into the lungs every 6 hours as needed for wheezing   albuterol (PROVENTIL) (2.5 MG/3ML) 0.083% neb solution Past Month at Unknown time  Yes Yes   Sig: Take 2.5 mg by nebulization every 6 hours as needed for shortness of breath / dyspnea or wheezing   aspirin 325 MG EC tablet 6/17/2019 at pm  No Yes   Sig: Take 1 tablet (325 mg) by mouth daily   atorvastatin (LIPITOR) 20 MG tablet 6/17/2019 at Unknown time  No Yes   Sig: TAKE ONE TABLET BY MOUTH ONE TIME DAILY   calcium carbonate-vitamin D (OS-CARLOS) 500-400 MG-UNIT tablet 6/17/2019 at Unknown time  Yes Yes   Sig: Take 1 tablet by mouth daily   ferrous sulfate (IRON) 325 (65 FE) MG tablet 6/17/2019 at Unknown time  Yes Yes   Sig: Take 325 mg by mouth daily (with breakfast)    gabapentin (NEURONTIN) 300 MG capsule 6/17/2019  Yes Yes   Sig: Take 300 mg by mouth 2 times daily   levothyroxine (SYNTHROID/LEVOTHROID) 75 MCG tablet 6/17/2019 at pm  No Yes   Sig: TAKE ONE TABLET BY MOUTH ONE TIME DAILY   metoprolol succinate (TOPROL-XL) 25 MG 24 hr tablet 6/17/2019 at Unknown time  No Yes   Sig: Take 1 tablet (25 mg) by mouth daily   mometasone-formoterol (DULERA) 200-5 MCG/ACT inhaler 6/17/2019 at Unknown time  Yes Yes   Sig: Inhale 2 puffs into the lungs 2 times daily   sertraline (ZOLOFT) 25 MG tablet 6/17/2019  Yes Yes   Sig: Take 25 mg by  mouth daily   traMADol (ULTRAM) 50 MG tablet Past Month at Unknown time  Yes Yes   Sig: Take 50 mg by mouth every 6 hours as needed for moderate to severe pain    traZODone (DESYREL) 50 MG tablet 6/17/2019 at Unknown time  Yes Yes   Sig: Take 100 mg by mouth At Bedtime      Facility-Administered Medications: None      REVIEW OF SYSTEMS:  Ten points reviewed, all are negative, except those mentioned in history of present illness.      PHYSICAL EXAMINATION:   GENERAL:  The patient is awake, alert, oriented, not in any distress.   VITAL SIGNS:  Blood pressure 128/60, pulse rate 75, temperature 97, oxygen saturation 93% on 3 liters.   HEENT:  Pink, nonicteric.  Extraocular muscle movement intact.   NECK:  Supple, no JVD, no thyromegaly.   CHEST:  Poor air exchange.  Scattered crackles at the bases, more on the right base.   ABDOMEN:  Soft, nontender, nondistended.  Positive bowel sounds.   EXTREMITIES:  No edema, cyanosis or clubbing.  Bruising noted on her legs.   NEUROLOGIC:  No focal neurologic deficit.  Moves all her extremities.  Cranial nerves II-XII grossly intact.   SKIN:  Warm and dry.  Some bruises noted on her legs.      DIAGNOSTIC TESTS OF INTEREST:  EKG showed sinus rhythm at 81 beats per minute.  QTc is 429.  There are premature atrial complexes noted.  Chest x-ray showed possible developing right lower lobe pneumonia with opacity in the lingula of the left upper lobe may represent additional site of pneumonia or atelectasis is reported.      LABORATORY:  Sodium 134, potassium 4, BUN 7, creatinine 0.7, calcium 8.4.  BNP 1500.  Glucose 127.  VBG showed pH 7.42, pCO2 of 48, pO2 of 34.  , hemoglobin 11.5, platelets 159.  Urinalysis is negative.  Blood culture is done.      ASSESSMENT:  Marie Kline is a pleasant 87-year-old female with extensive past medical history including chronic obstructive pulmonary disease on as-needed oxygen at home, cardiomyopathy, hypertension, hyperlipidemia, chronic  lymphocytic leukemia, hypothyroidism, among others, who presented today with worsening shortness of breath of 2 days and unable to use her oxygen from her concentrator and came to the hospital and is being admitted after chest x-ray finding and hypoxia.      1.  Acute hypoxemic respiratory failure secondary to pneumonia and chronic obstructive pulmonary disease.   2.  Right lower lobe pneumonia.  Suspect possible healthcare-associated, given recent hospital admission.   3.  Chronic obstructive pulmonary disease exacerbation.   4.  History of stress cardiomyopathy.   5.  Hypothyroidism.   6.  Chronic lymphocytic leukemia.      PLAN:  The patient is being admitted as an inpatient.  I expect 2-3 night stay in the hospital.  She will be on IV antibiotics, we change it to Zosyn and Zithromax to cover for gram negatives.  There is no history of MRSA.  We did not continue vancomycin.  Follow up blood culture.  Bronchodilators and nebulizers will be given.  We will continue steroids, Solu-Medrol 40 mg twice a day.  Titrate oxygen down as tolerated.  The patient will need her Lasix when her medications are reconciled.  The patient has chronic lymphocytic leukemia.  She gets infusions every month and expected to have one on 2019, tomorrow.  Her hematologist is Dr. Ivory,  We will need to contact her hematologist.  At this point we did not consult as there is no planned intervention.  Probably she will need her infusion after she is treated for infection and respiratory failure.  I discussed with the patient at length.  All her questions and concerns addressed.  She showed understanding.  She is DO NOT INTUBATE, DO NOT RESUSCITATE.         MARIEL BUTLER MD             D: 2019   T: 2019   MT: [id number]      Name:     MAGGI RODRIGUEZ   MRN:      [id number]        Account:      [id number]   :      1932        Admitted:     2019                   Document: [id number]       cc: Piper Kiser MD

## 2019-06-18 NOTE — ED PROVIDER NOTES
History     Chief Complaint:  Shortness of Breath    HPI   Marie Kline is a 87 year old female, with a history of CLL, COPD on 1-2L, myocardial infarction, and CHF, who presents to the ED for evaluation of shortness of breath. The patient reports she developed progressively worsening shortness of breath yesterday morning. She has associated chest tightness with mild productive cough. Her phlegm is green. She has used her Albuterol neb x2 and O2 concentrator without alleviation of symptoms. She received a new concentrator; therefore, she had difficulty using the device and believes she was not receiving O2. She uses her concentrator at night. The patient notes her shortness of breath has improved after receiving treatment from EMS. The patient denies any chills, abdominal pain, nausea, vomiting, difficulty urinating, dysuria, urgency, or frequency. Of note, the patient was placed on 4L nasal cannula per EMS.     Allergies:  Contrast dye: hives   Adhesive tape  Prolia: jaw osteonecrosis     Medications:    Albuterol inhaler  Albuterol neb  Aspirin 325mg  Atorvastatin  Oscal  Iron  Gabapentin  Levothyroxine   Metoprolol   Dulera inhaler  Multivitamin  Senokot   Zoloft  Trazodone     Past Medical History:    Arthritis  Bladder cancer  Cardiomyopathy  CLL  CHF  COPD  HTN  Hypothyroidism   Mumps  MI  Tricuspid valve disorders  Pulmonary edema    Past Surgical History:    Right inguinal lymph node biopsy  Bladder surgery  Left heart cath  Bladder biopsy   Transurethral resection  Left hip hemiarthroplasty/ORIF    Family History:    Cerebrovascular disease  Cancer    Social History:  Smoking status: Former smoker, Quit 1996   Alcohol use: No  Presents to ED alone    Resides at [facility], independent living   Marital Status:   [5]     Review of Systems   Constitutional: Negative for chills.   Respiratory: Positive for cough, chest tightness and shortness of breath.    Gastrointestinal: Negative for abdominal pain,  "nausea and vomiting.   Genitourinary: Negative for difficulty urinating, dysuria, frequency and urgency.   All other systems reviewed and are negative.    Physical Exam     Patient Vitals for the past 24 hrs:   BP Temp Temp src Pulse Heart Rate Resp SpO2 Height Weight   06/18/19 0338 -- -- -- -- 78 25 94 % -- --   06/18/19 0243 -- -- -- -- -- -- 92 % -- --   06/18/19 0242 128/60 -- -- 75 -- -- -- -- --   06/18/19 0204 145/79 100.3  F (37.9  C) Oral 81 81 (!) 32 92 % 1.499 m (4' 11\") 43.1 kg (95 lb)     Physical Exam  Constitutional: Alert, attentive, GCS 15. Thin appearing. On nasal cannula. In no distress.   HENT:    Nose: Nose normal.    Mouth/Throat: Oropharynx is clear, mucous membranes are moist   Eyes: EOM are normal, anicteric, conjugate gaze  CV: regular rate and rhythm; no murmurs  Chest: Decreased air movement. Bilateral crackles in base. No overt wheezing on nasal cannula.   GI:  non tender. No distension. No guarding or rebound.    MSK: No LE edema, no tenderness to palpation of BLE.  Neurological: Alert, attentive, moving all extremities equally.   Skin: Skin is warm and dry.    Emergency Department Course     ECG (2:08:20):  Rate 81 bpm. FL interval 172. QRS duration 96. QT/QTc 370/429. P-R-T axes 114 52 63. Sinus rhythm with premature atrial complexes. Otherwise normal ECG. No significant change compared to EKG dated 5/7/10. Interpreted at 0215 by Wenceslao Dumont MD.    Imaging:  Radiographic findings were communicated with the patient who voiced understanding of the findings.    XR Chest 2 Views  IMPRESSION: Possible developing right lower lobe pneumonia. Wedge of  opacity in the lingula of the left upper lobe may represent an  additional site of pneumonia or atelectasis. As read by Radiology.     Laboratory:  Blood gas venous & oxyhgb (0257): pH 7.42, PCO2 48,  PO2 34, Bicarbonate 31(H), Oxyhgb 60, Base excess 5.6    BNP: 1,500    CBC: .0(HH), HGB 11.5(L), o/w WNL ()  BMP: " Glucose 127(H), Calcium 8.4(L), o/w WNL (Creatinine 0.77)     Blood cultures x2: In process     UA: Mucous present, o/w Negative     Interventions:  0246: Solu-medrol 125mg IV  0243: Duoneb 3mLs Neb   0355: NS 250mL Bolus IV  0406: Cefepime 2g IV  0450: Vancomycin 1,000mg IV  0451: Azithromycin 500mg IV    Emergency Department Course:  Patient arrived by EMS.     Past medical records, nursing notes, and vitals reviewed.  0223: I performed an exam of the patient and obtained history, as documented above.    IV inserted and blood drawn.    The patient was sent for a chest x-ray while in the emergency department, findings above.    0356: I spoke to Dr. Slaughter of the hospitalist service who accepts the patient for admission.     0410: I rechecked the patient. Explained findings to patient.    Findings and plan explained to the Patient who consents to admission. Discussed the patient with Dr. Slaughter, who will admit the patient to a [facility] bed for further monitoring, evaluation, and treatment.     Impression & Plan    Medical Decision Makin-year-old with past medical history significant for CLL, COPD on home O2, history of CAD/CHF presenting for evaluation of gradually increasing shortness of breath with increased cough and sputum production.  She is found to be febrile to 100.3 degrees concerning for infectious etiology.  Chest x-ray shows likely right lower lobe pneumonia.  She does have profound leukocytosis with white count of 108, c/w history of CLL, with increased due to her infection.  Technically she does meet criteria for H CAP coverage given she lives in assisted living at recent hospitalization.  She was given ceftriaxone, azithromycin and vancomycin.  Symptomatically she is markedly improved with DuoNeb's prior to arrival she was given steroids here as well.  EKG without evidence of ischemia.  She was admitted to the hospital for further management of COPD exacerbation, pneumonia and continued monitoring of  her CLL.    Diagnosis:    ICD-10-CM   1. COPD exacerbation (H) J44.1   2. Healthcare associated bacterial pneumonia J15.9   3. CLL (chronic lymphocytic leukemia) (H) C91.90   4. Leukocytosis, unspecified type D72.829     Disposition: Patient admitted to a med bed by Dr. Kasandra Dumont MD   [employer]  7:11 AM 06/18/19     Casandra Slater  6/18/2019   [facility] EMERGENCY DEPARTMENT    Scribe Disclosure:  ICasandra, am serving as a scribe at 2:23 AM on 6/18/2019 to document services personally performed by Wenceslao Dumont MD based on my observations and the provider's statements to me.        Wenceslao Dumont MD  06/18/19 0753

## 2019-06-18 NOTE — PHARMACY-ADMISSION MEDICATION HISTORY
Admission medication history interview status for this patient is complete. See [facility] admission navigator for allergy information, prior to admission medications and immunization status.     Medication history interview source(s):Patient  Medication history resources (including written lists, pill bottles, clinic record): patient's own printed med list  [facility]    Changes made to PTA medication list:  Added: none  Deleted: APAP, ondansetron, senna-docusate  Changed: Trazodone prn to scheduled, gabapentin (at bedtime --> BID)    Actions taken by pharmacist (provider contacted, etc):None     Additional medication history information:None    Medication reconciliation/reorder completed by provider prior to medication history? Yes    For patients on insulin therapy: No (Yes/No)  Lantus/levemir/NPH/Mix 70/30 dose:  _____   in AM/PM  or twice daily   Sliding scale Novolog Y/N  If Yes, do you have a baseline novolog pre-meal dose:  ______units with meals   Patients eat three meals a day:   Y/N     Any Barriers to therapy:  cost of medications/comfortable with giving injections (if applicable)/ comfortable and confident with current diabetes regimen       Prior to Admission medications    Medication Sig Last Dose Taking? Auth Provider   aspirin 325 MG EC tablet Take 1 tablet (325 mg) by mouth daily 6/17/2019 at pm Yes Jocelin Streeter PA-C   atorvastatin (LIPITOR) 20 MG tablet TAKE ONE TABLET BY MOUTH ONE TIME DAILY 6/17/2019 at Unknown time Yes Piper Kiser MD   calcium carbonate-vitamin D (OS-CARLOS) 500-400 MG-UNIT tablet Take 1 tablet by mouth daily 6/17/2019 at Unknown time Yes Reported, Patient   ferrous sulfate (IRON) 325 (65 FE) MG tablet Take 325 mg by mouth daily (with breakfast)  6/17/2019 at Unknown time Yes Reported, Patient   gabapentin (NEURONTIN) 300 MG capsule Take 300 mg by mouth At Bedtime 6/17/2019 at Unknown time Yes Unknown, Entered By History   levothyroxine  (SYNTHROID/LEVOTHROID) 75 MCG tablet TAKE ONE TABLET BY MOUTH ONE TIME DAILY 6/17/2019 at pm Yes Piper Kiser MD   metoprolol succinate (TOPROL-XL) 25 MG 24 hr tablet Take 1 tablet (25 mg) by mouth daily 6/17/2019 at Unknown time Yes Piper Kiser MD   mometasone-formoterol (DULERA) 200-5 MCG/ACT inhaler Inhale 2 puffs into the lungs 2 times daily 6/17/2019 at Unknown time Yes Reported, Patient   Multiple Vitamins-Minerals (MULTIVITAMIN GUMMIES ADULT PO) Take 2 tablets by mouth daily  6/17/2019 at Unknown time Yes Reported, Patient   traZODone (DESYREL) 50 MG tablet Take 100 mg by mouth At Bedtime 6/17/2019 at Unknown time Yes Unknown, Entered By History   acetaminophen (TYLENOL) 325 MG tablet Take 2 tablets (650 mg) by mouth every 4 hours as needed for mild pain   Ildefonso Goddard MD   albuterol (PROAIR HFA/PROVENTIL HFA/VENTOLIN HFA) 108 (90 BASE) MCG/ACT Inhaler Inhale 2 puffs into the lungs every 6 hours as needed for wheezing   Piper Kiser MD   albuterol (PROVENTIL) (2.5 MG/3ML) 0.083% neb solution Take 2.5 mg by nebulization every 6 hours as needed for shortness of breath / dyspnea or wheezing   Reported, Patient   ondansetron (ZOFRAN-ODT) 4 MG ODT tab Take 1 tablet (4 mg) by mouth every 6 hours as needed for nausea or vomiting   Jocelin Streeter PA-C   senna-docusate (SENOKOT-S/PERICOLACE) 8.6-50 MG tablet Take 1 tablet by mouth 2 times daily as needed for constipation   Ildefonso Goddard MD   TRAMADOL HCL PO Take 50 mg by mouth every 6 hours as needed for moderate to severe pain    Reported, Patient     ++ Addendum: patient brought in her printed med list; added sertraline back, changed gabapentin from at bedtime --> BID; deleted zofran, APAP, stool softeneres

## 2019-06-18 NOTE — PROGRESS NOTES
RT visited pt to discuss COPD management, pneumonia prevention strategies, and educate pt on overall good practice for managing COPD. Pt was educated on proper medications for COPD management and reviewed pt home meds with mechanisms of action for each. Pt states she has weak hands and has a hard time discharging the medication from her Symbicort. I encouraged her to inquire of her  About the possibility of getting a nebulized corticosteroid like Pulmicort. I wrote down the name and use for her. Pt states she has an incentive spirometer at home and was encouraged to use at home regularly in addition to hospital use during her stay. Pt demonstrated comprehension and enthusiasm for importance of care. A pamphlet on pneumonia prevention was delivered to her after allowing time to answer her questions.

## 2019-06-18 NOTE — CONSULTS
[facility]    Palliative Care Consultation   Text Page    Date of Admission:  6/18/2019    Assessment & Plan   Marie Kline is a 87 year old female who was admitted on 6/18/2019. I was asked to see the patient for goals of care.    Recommendations:  1.Decisional Capacity -  Intact. Patient does not have an advance directive. Per  informed consent policy next of kin should be involved if she becomes unable.  Daughter Margaret is at bedside during assessment and stated she is patient's only child.  2. Pain-   Patient endorses left hip pain secondary to bursitis following hip replacement in November 2018.    - lidocaine patches apply at 8pm daily.  - diclofenac gel three times daily.  3. Anxiety - likely related to shortness of breath, air hunger.  Patient and daughter describe symptom as intermittent or 'situational.'  Daughter endorses that her mother seems to have daily episodes of anxiety in relation to her COPD.  - Xanax tablet 0.25mg BID prn  4. Spiritual Care- Oriented to Spiritual Health and Social Work Services as part of Palliative Care team.   5. Care Planning-   - continue with current plan of care  - no indication for modification to current POLST on file.  - discharge back to [facility] when medically stable.    Goal of Care: Restorative, DNR/DNI, wishes to be treated for exacerbation and return to her previous place of residence.      Disease Process/es & Symptoms:  Marie Kline is a 87 year old patient with history of COPD on home O2, cardiomyopathy, hypertension, hyperlipidemia, anxiety, CLL, bladder Cancer, hypothyroidism admitted with symptoms of shortness of breath on 6/17/19. She has been treated for COPD exacerbation and for possible pneumonia with nebulizers and antibiotics.      This is in the setting of COPD and multiple comorbidities.  She has been hospitalized 5 times in the past 12 months for recurrent COPD exacerbations, NSTEMI and fall with resultant hip  fracture requiring repair. There is not reported or documented decline in patient's function.  There has been a decline in daily function including decreased endurance for which patient has needed to decrease her involvement in social activities at her residence.     Patient states she has not noticed a significant decline in her health.  When asked she does not view her hospitalizations as complications related to her chronic illness and does not feel she has had declines in her functional status or change in baseline status due to these hospitalizations.    Findings & plan of care discussed with: Lore Sellers, bedside nurse.  Follow-up plan from palliative team: will follow for symptom management tomorrow.  Thank you for involving us in the patient's care.     Na SONG, CNP  Pain Management and Palliative Care  [facility]  Pgr: [phone]    Time Spent on this Encounter   Total unit/floor time 60 (1400 - 1500) minutes, time consisted of the following, examination of the patient, reviewing the record and completing documentation. >50% of time spent in counseling and coordination of care.  Time spend counseling with patient and family consisted of the following topics, goals of care, care planning for discharge and symptom management.  Time spent in coordination of care with Bedside Nurse Lore Sellers.     Reason for Consult   Reason for consult: I was asked by Dr. Moss to evaluate this patient for Symptom management  Goals of care  Patient and family support.    Primary Care Physician   Piper Kiser    Chief Complaint   Shortness of breath.    History is obtained from the patient, electronic health record and patient's daughter    History of Present Illness   Marie Kline is a 87 year old patient with history of COPD on home O2, cardiomyopathy, hypertension, hyperlipidemia, anxiety, CLL, bladder Cancer, hypothyroidism admitted with symptoms of shortness of breath on 6/17/19. She states  that she was trying to get her oxygen placed and her oxygen concentrator turned on and it was alarming.  She states she got anxious and worked up because she tried to trouble shoot the machine for a number of hours, from 10pm until around 1am, after she was unsuccessful, she pressed her alert button and called her daughter to let her know.  EMS arrived and placed supplemental oxygen and patient stated she felt better immediately.  She states the EMS personnel were unable to troubleshoot the oxygen concentrator and recommended she get a replacement machine.  She presented to [facility] ED for management of symptoms and for evaluation of hypoxia seen on monitor by EMS.    Patient states that she felt completely back to normal by the time she was in the Emergency department.  She has been treated for COPD exacerbation and for possible pneumonia with nebulizers and antibiotics overnight.      The following symptoms are noted by patient as concerning to her quality of life.  Dyspnea - secondary to COPD, on home oxygen which helps with symptom.  Anxiety - has intermittent episodes associated with shortness of breath.  Pain - left hip bursitis, new since November 2018.    Decision-Making & Goals of Care:  Discussed on June 18, 2019 with Na SONG, CNP:     Met with patient and daughter Margaret at the bedside to assess goals of care and symptom management.  Patient was surprised to know that palliative care was consulted and mentioned that she 'is a fighter' and that her goal was to get back to her apartment.      Clarified the definition of palliative care and the supportive role that PC can provide in the hospital and following discharge if she wishes.  Discussed that part of palliative care is end of life, but that is not the core of the service.  Patient verbalized understanding of the palliative care service and involvement for symptom management and supportive cares while hospitalized.      Patient and  daughter recounted shortness of breath that led to hospitalization, daughter stated that she becomes quite anxious when she feels short of breath and that this gets her 'spinning'.  Patient acknowledged the same saying that she became 'overwhelmed' when she felt like she could not breath or did not have her oxygen.     Discussed previously established POLST form and desire for DNR/DNI code status, patient agreed that if something 'extreme' happened she would not wish to be kept alive by artificial means and that she would only be ok with temporary intubation or use of feeding tube.  If she were not able to recover she would not want those interventions started.      In review of other possible symptoms that related to quality of life, patient mentioned left hip pain that her surgeon called bursitis that has been bothering her since last November when she underwent hip replacement surgery.  She states that the pain is present when she walks, but does not hurt when she is sitting or stationary.    Patient has decision-making capacity Intact  Patient has no legal document designating a decision maker but has verbally stated a preference for neil Robles to be the decision maker. See contact information in Code/ACP/Advance Care Planning Activity Tab. Patient has been advised to execute an advance directive to legalize stated preference. Per policy persons unrelated to the patient have no legal authority to make health care decisions on behalf of the patient and next of kin is the designated decision maker. See System Informed Consent policy for guidance.  Physician orders for life-sustaining treatment (POLST) form is on file  Code Status: Do not resusitate / Do not intubate     Past Medical History   I have reviewed this patient's medical history and updated it with pertinent information if needed.   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  Past Surgical History:   Procedure Laterality Date     BIOPSY LYMPH NODE INGUINAL Right 10/23/2018    Procedure: excsional biopsy right inguinal lymph node;  Surgeon: Reji Connors MD;  Location: [facility] OR     BLADDER SURGERY       CORONARY ANGIOGRAPHY ADULT ORDER  10/2014    minimal CAD     CV LEFT HEART CATH N/A 12/11/2018    Procedure: Left Heart Cath;  Surgeon: Sadiq Carrasco MD;  Location:  HEART CARDIAC CATH LAB     CYSTOSCOPY       CYSTOSCOPY, BIOPSY BLADDER, COMBINED N/A 2/9/2016    Procedure: COMBINED CYSTOSCOPY, BIOPSY BLADDER;  Surgeon: Kar Nuñez MD;  Location:  OR     CYSTOSCOPY, TRANSURETHRAL RESECTION (TUR) TUMOR BLADDER, COMBINED N/A 2/9/2016    Procedure: COMBINED CYSTOSCOPY, TRANSURETHRAL RESECTION (TUR) TUMOR BLADDER;  Surgeon: Kar Nuñez MD;  Location:  OR     ESOPHAGOSCOPY, GASTROSCOPY, DUODENOSCOPY (EGD), COMBINED N/A 6/22/2016    Procedure: COMBINED ESOPHAGOSCOPY, GASTROSCOPY, DUODENOSCOPY (EGD);  Surgeon: Freddie Diez MD;  Location:  GI     OPEN REDUCTION INTERNAL FIXATION HIP BIPOLAR Left 11/19/2018    Procedure: Left bipolar hemiarthroplasty;  Surgeon: Scar Reynolds MD;  Location: [facility] OR       Prior to Admission Medications   Prior to Admission Medications   Prescriptions Last Dose Informant Patient Reported? Taking?    Multiple Vitamins-Minerals (MULTIVITAMIN GUMMIES ADULT PO) 6/17/2019 at Unknown time  Yes Yes   Sig: Take 2 tablets by mouth daily    TRAMADOL HCL PO   Yes No   Sig: Take 50 mg by mouth every 6 hours as needed for moderate to severe pain    acetaminophen (TYLENOL) 325 MG tablet   No No   Sig: Take 2 tablets (650 mg) by mouth every 4 hours as needed for mild pain   albuterol (PROAIR HFA/PROVENTIL HFA/VENTOLIN HFA) 108 (90 BASE) MCG/ACT Inhaler   No No   Sig: Inhale 2 puffs into the lungs every 6 hours as needed for wheezing   albuterol (PROVENTIL) (2.5 MG/3ML) 0.083% neb solution   Yes No   Sig: Take 2.5 mg by nebulization every 6 hours as needed for shortness of breath / dyspnea or wheezing   aspirin 325 MG EC tablet 6/17/2019 at pm  No Yes   Sig: Take 1 tablet (325 mg) by mouth daily   atorvastatin (LIPITOR) 20 MG tablet 6/17/2019 at Unknown time  No Yes   Sig: TAKE ONE TABLET BY MOUTH ONE TIME DAILY   calcium carbonate-vitamin D (OS-CARLOS) 500-400 MG-UNIT tablet 6/17/2019 at Unknown time  Yes Yes   Sig: Take 1 tablet by mouth daily   ferrous sulfate (IRON) 325 (65 FE) MG tablet 6/17/2019 at Unknown time  Yes Yes   Sig: Take 325 mg by mouth daily (with breakfast)    gabapentin (NEURONTIN) 300 MG capsule 6/17/2019 at Unknown time  Yes Yes   Sig: Take 300 mg by mouth At Bedtime   levothyroxine (SYNTHROID/LEVOTHROID) 75 MCG tablet 6/17/2019 at pm  No Yes   Sig: TAKE ONE TABLET BY MOUTH ONE TIME DAILY   metoprolol succinate (TOPROL-XL) 25 MG 24 hr tablet 6/17/2019 at Unknown time  No Yes   Sig: Take 1 tablet (25 mg) by mouth daily   mometasone-formoterol (DULERA) 200-5 MCG/ACT inhaler 6/17/2019 at Unknown time  Yes Yes   Sig: Inhale 2 puffs into the lungs 2 times daily   ondansetron (ZOFRAN-ODT) 4 MG ODT tab   No No   Sig: Take 1 tablet (4 mg) by mouth every 6 hours as needed for nausea or vomiting   senna-docusate (SENOKOT-S/PERICOLACE) 8.6-50 MG tablet   No No   Sig: Take 1 tablet by mouth 2 times daily as needed for  constipation   traZODone (DESYREL) 50 MG tablet 6/17/2019 at Unknown time  Yes Yes   Sig: Take 100 mg by mouth At Bedtime      Facility-Administered Medications: None     Allergies   Allergies   Allergen Reactions     Diagnostic X-Ray Materials Hives     CT DYE     Contrast Dye Hives     CT DYE     Prolia [Denosumab]      Osteonecrosis jaw       Wound Dressing Adhesive        Social History   I have updated and reviewed the following Social History Narrative:   Social History     Social History Narrative     Not on file      Living situation: [facility] apartment/assisted living.  Family system: daughter and son-in-law  Functional status (needs help with ADLs or IADLs): independent with ADLs.  Employment/education: unknown.  Use of community resources: no  Activities/interests: social events, chapel at assisted living. 'care giver' to friends and family.  History of substance use/abuse: no  Buddhist affiliation: Episcopalian  Involvement in reinier community: participates in chapel services at her residence.    Family History   I have reviewed this patient's family history and updated it with pertinent information if needed.   Family History   Problem Relation Age of Onset     Cerebrovascular Disease Mother      Cancer Father        Review of Systems   The 10 point Review of Systems is negative other than noted in the HPI or here.     Palliative Symptom Review (0=no symptom/no concern, 1=mild, 2=moderate, 3=severe):      Pain: 1-mild      Fatigue: 1-mild      Nausea: 0-none      Constipation: 0-none      Diarrhea: 0-none      Depressive Symptoms: 0-none      Anxiety: 1-mild      Drowsiness: 0-none      Poor Appetite: 0-none      Shortness of Breath: 1-mild      Insomnia: 0-none      Overall (0 good/no concerns, 3 very poor):  0-good.    Physical Exam   Temp:  [96.4  F (35.8  C)-100.3  F (37.9  C)] 96.4  F (35.8  C)  Pulse:  [75-81] 75  Heart Rate:  [70-81] 70  Resp:  [20-32] 20  BP: (111-145)/(60-79) 127/60  SpO2:  [91  %-98 %] 91 %  91 lbs 11.2 oz  GEN:  Alert, oriented x 3, appears comfortable, NAD.  HEENT:  Normocephalic/atraumatic, no scleral icterus, no nasal discharge, mouth moist.  CV:  RRR, S1, S2; no murmurs or other irregularities noted.  +3 DP/PT pulses bilatererally; no edema BLE.  RESP:  Clear to auscultation bilaterally without rales/rhonchi/wheezing/retractions.  Symmetric chest rise on inhalation noted.  Normal respiratory effort.  ABD:  Rounded, soft, non-tender/non-distended.  +BS  EXT:  Edema & pulses as noted above.  CMS intact x 4.     M/S:   Tender to palpation over left hip joint.    SKIN:  Dry to touch, no exanthems noted in the visualized areas.    NEURO: Symmetric strength +5/5.  Sensation to touch intact all extremities.   There is no area of allodynia or hyperesthesia.  Psych:  Normal affect.  Calm, cooperative, conversant appropriately.     Delirium Screen/CAM:  Delirium = (#1 and #2 = YES) + (#3 and/or #4)   1) Acute onset and fluctuating course:   No   (acute change in mental status from baseline over last 24 hours)  2) Inattention:   No   (difficulty focusing, distractible, can't follow conversation)  3) Disorganized thinking:   Not applicable   (score only if #1 and #2 are YES)  (rambling/irrelevant conversation, unclear/illogical thoughts, inconsistency)  4) Altered level of consciousness:   Not applicable   (score only if #1 and #2 are YES)  (other than alert, calm, cooperative)    Delirium/CAM score: 0/4  Interpretation:  1)  Delirium:  Absent    Data   Last Comprehensive Metabolic Panel:  Sodium   Date Value Ref Range Status   06/18/2019 134 133 - 144 mmol/L Final     Potassium   Date Value Ref Range Status   06/18/2019 4.6 3.4 - 5.3 mmol/L Final     Chloride   Date Value Ref Range Status   06/18/2019 100 94 - 109 mmol/L Final     Carbon Dioxide   Date Value Ref Range Status   06/18/2019 30 20 - 32 mmol/L Final     Anion Gap   Date Value Ref Range Status   06/18/2019 4 3 - 14 mmol/L Final      Glucose   Date Value Ref Range Status   06/18/2019 127 (H) 70 - 99 mg/dL Final     Urea Nitrogen   Date Value Ref Range Status   06/18/2019 7 7 - 30 mg/dL Final     Creatinine   Date Value Ref Range Status   06/18/2019 0.77 0.52 - 1.04 mg/dL Final     GFR Estimate   Date Value Ref Range Status   06/18/2019 69 >60 mL/min/[1.73_m2] Final     Comment:     Non  GFR Calc  Starting 12/18/2018, serum creatinine based estimated GFR (eGFR) will be   calculated using the Chronic Kidney Disease Epidemiology Collaboration   (CKD-EPI) equation.       Calcium   Date Value Ref Range Status   06/18/2019 8.4 (L) 8.5 - 10.1 mg/dL Final     CBC RESULTS:   Recent Labs   Lab Test 06/18/19  0629 06/18/19  0238   WBC  --  108.0*   RBC  --  4.00   HGB  --  11.5*   HCT  --  37.7   MCV  --  94   MCH  --  28.8   MCHC  --  30.5*   RDW  --  18.2*   * 159

## 2019-06-18 NOTE — ED NOTES
"[facility]  ED Nurse Handoff Report    Marie Kline is a 87 year old female   ED Chief complaint: Shortness of Breath  . ED Diagnosis:   Final diagnoses:   COPD exacerbation (H)   Healthcare associated bacterial pneumonia   CLL (chronic lymphocytic leukemia) (H)   Leukocytosis, unspecified type     Allergies:   Allergies   Allergen Reactions     Diagnostic X-Ray Materials Hives     CT DYE     Contrast Dye Hives     CT DYE     Prolia [Denosumab]      Osteonecrosis jaw       Wound Dressing Adhesive        Code Status: not on file  Activity level - Baseline/Home:  Independent. Activity Level - Current:   Assist X 1. Lift room needed: No. Bariatric: No   Needed: No   Isolation: No. Infection: Not Applicable.     Vital Signs:   Vitals:    06/18/19 0204 06/18/19 0242 06/18/19 0243 06/18/19 0338   BP: 145/79 128/60     Pulse: 81 75     Resp: (!) 32   25   Temp: 100.3  F (37.9  C)      TempSrc: Oral      SpO2: 92%  92% 94%   Weight: 43.1 kg (95 lb)      Height: 1.499 m (4' 11\")          Cardiac Rhythm:  ,   Cardiac  Cardiac Rhythm: Normal sinus rhythm  Pain level:    Patient confused: No. Patient Falls Risk: Yes.   Elimination Status: has not yet voided   Patient Report - Initial Complaint: SOB. Focused Assessment: Hx of leukemia.  Progressively worsening shortness of breath, states that yesterday she felt like she was \"fighting for every breath\"  Chest tightness, productive cough with green phlegm.  Has O2 and nebs at home she has been using, EMS reported concern that O2 was not working properly/not hooked up correctly.      Diminished lung sounds  Feels better after nebs      Tests Performed:   Labs Ordered and Resulted from Time of ED Arrival Up to the Time of Departure from the ED   BLOOD GAS VENOUS AND OXYHGB - Abnormal; Notable for the following components:       Result Value    Bicarbonate Venous 31 (*)     All other components within normal limits   CBC WITH PLATELETS DIFFERENTIAL - " Abnormal; Notable for the following components:    .0 (*)     Hemoglobin 11.5 (*)     MCHC 30.5 (*)     RDW 18.2 (*)     All other components within normal limits   BASIC METABOLIC PANEL - Abnormal; Notable for the following components:    Glucose 127 (*)     Calcium 8.4 (*)     All other components within normal limits   NT PROBNP INPATIENT   ROUTINE UA WITH MICROSCOPIC   BLOOD CULTURE   BLOOD CULTURE     XR Chest 2 Views   Final Result   IMPRESSION: Possible developing right lower lobe pneumonia. Wedge of   opacity in the lingula of the left upper lobe may represent an   additional site of pneumonia or atelectasis.       PADMINI MOULTON MD        Treatments provided: See MAR  Family Comments: none present  OBS brochure/video discussed/provided to patient:  N/A  ED Medications:   Medications   ipratropium - albuterol 0.5 mg/2.5 mg/3 mL (DUONEB) neb solution 3 mL (3 mLs Nebulization Given 6/18/19 0243)   0.9% sodium chloride BOLUS (250 mLs Intravenous New Bag 6/18/19 0355)   ceFEPIme (MAXIPIME) 2 g vial to attach to  ml bag for ADULTS or 50 ml bag for PEDS (has no administration in time range)   azithromycin (ZITHROMAX) 500 mg in sodium chloride 0.9 % 250 mL intermittent infusion (has no administration in time range)   methylPREDNISolone sodium succinate (solu-MEDROL) injection 125 mg (125 mg Intravenous Given 6/18/19 0246)     Drips infusing:  No  For the majority of the shift, the patient's behavior Green. Interventions performed were rounding.     Severe Sepsis OR Septic Shock Diagnosis Present: No      ED Nurse Name/Phone Number: Aydee Guerrero,   3:55 AM    RECEIVING UNIT ED HANDOFF REVIEW    Above ED Nurse Handoff Report was reviewed: Yes  Reviewed by: Gloria Huang on June 18, 2019 at 4:28 AM

## 2019-06-18 NOTE — ED TRIAGE NOTES
Pt arrives to the ED via EMS from home. Per EMS pt started to feel SOB today at home. Pt gave herself two albuterol nebs at home with no relief. EMS states that there seemed to be something wrong with pt's concentrator at home so she was not receiving oxygen properly. Pt states she normally sats 90% and is on 1-2 liters usually at home. Pt has h/o of COPD.  for EMS. Pt ws 87% on 4 liters NC for EMS. Pt satting now 92-95% on 4 liters NC.

## 2019-06-19 LAB
ANION GAP SERPL CALCULATED.3IONS-SCNC: 5 MMOL/L (ref 3–14)
BUN SERPL-MCNC: 14 MG/DL (ref 7–30)
CALCIUM SERPL-MCNC: 9.1 MG/DL (ref 8.5–10.1)
CHLORIDE SERPL-SCNC: 103 MMOL/L (ref 94–109)
CO2 SERPL-SCNC: 29 MMOL/L (ref 20–32)
CREAT SERPL-MCNC: 0.77 MG/DL (ref 0.52–1.04)
ERYTHROCYTE [DISTWIDTH] IN BLOOD BY AUTOMATED COUNT: 18 % (ref 10–15)
GFR SERPL CREATININE-BSD FRML MDRD: 69 ML/MIN/{1.73_M2}
GLUCOSE SERPL-MCNC: 136 MG/DL (ref 70–99)
HCT VFR BLD AUTO: 34.9 % (ref 35–47)
HGB BLD-MCNC: 10.5 G/DL (ref 11.7–15.7)
MCH RBC QN AUTO: 28.3 PG (ref 26.5–33)
MCHC RBC AUTO-ENTMCNC: 30.1 G/DL (ref 31.5–36.5)
MCV RBC AUTO: 94 FL (ref 78–100)
PLATELET # BLD AUTO: 117 10E9/L (ref 150–450)
POTASSIUM SERPL-SCNC: 3.9 MMOL/L (ref 3.4–5.3)
RBC # BLD AUTO: 3.71 10E12/L (ref 3.8–5.2)
SODIUM SERPL-SCNC: 137 MMOL/L (ref 133–144)
WBC # BLD AUTO: 73.3 10E9/L (ref 4–11)

## 2019-06-19 PROCEDURE — 25000128 H RX IP 250 OP 636: Performed by: INTERNAL MEDICINE

## 2019-06-19 PROCEDURE — 80048 BASIC METABOLIC PNL TOTAL CA: CPT | Performed by: INTERNAL MEDICINE

## 2019-06-19 PROCEDURE — 36415 COLL VENOUS BLD VENIPUNCTURE: CPT | Performed by: INTERNAL MEDICINE

## 2019-06-19 PROCEDURE — 25000132 ZZH RX MED GY IP 250 OP 250 PS 637: Mod: GY | Performed by: INTERNAL MEDICINE

## 2019-06-19 PROCEDURE — 94640 AIRWAY INHALATION TREATMENT: CPT | Mod: 76

## 2019-06-19 PROCEDURE — 25000132 ZZH RX MED GY IP 250 OP 250 PS 637: Mod: GY | Performed by: NURSE PRACTITIONER

## 2019-06-19 PROCEDURE — 25000125 ZZHC RX 250: Performed by: INTERNAL MEDICINE

## 2019-06-19 PROCEDURE — 99233 SBSQ HOSP IP/OBS HIGH 50: CPT | Performed by: INTERNAL MEDICINE

## 2019-06-19 PROCEDURE — A9270 NON-COVERED ITEM OR SERVICE: HCPCS | Mod: GY | Performed by: NURSE PRACTITIONER

## 2019-06-19 PROCEDURE — 99232 SBSQ HOSP IP/OBS MODERATE 35: CPT | Performed by: NURSE PRACTITIONER

## 2019-06-19 PROCEDURE — 40000275 ZZH STATISTIC RCP TIME EA 10 MIN

## 2019-06-19 PROCEDURE — 85027 COMPLETE CBC AUTOMATED: CPT | Performed by: INTERNAL MEDICINE

## 2019-06-19 PROCEDURE — 94640 AIRWAY INHALATION TREATMENT: CPT

## 2019-06-19 PROCEDURE — A9270 NON-COVERED ITEM OR SERVICE: HCPCS | Mod: GY | Performed by: INTERNAL MEDICINE

## 2019-06-19 PROCEDURE — 12000000 ZZH R&B MED SURG/OB

## 2019-06-19 RX ORDER — GABAPENTIN 300 MG/1
300 CAPSULE ORAL 2 TIMES DAILY
Status: DISCONTINUED | OUTPATIENT
Start: 2019-06-19 | End: 2019-06-20

## 2019-06-19 RX ORDER — NAPROXEN 250 MG/1
250 TABLET ORAL
Status: DISCONTINUED | OUTPATIENT
Start: 2019-06-19 | End: 2019-06-23 | Stop reason: HOSPADM

## 2019-06-19 RX ORDER — METOPROLOL SUCCINATE 25 MG/1
25 TABLET, EXTENDED RELEASE ORAL DAILY
Status: DISCONTINUED | OUTPATIENT
Start: 2019-06-20 | End: 2019-06-23 | Stop reason: HOSPADM

## 2019-06-19 RX ORDER — FERROUS SULFATE 325(65) MG
325 TABLET ORAL
Status: DISCONTINUED | OUTPATIENT
Start: 2019-06-20 | End: 2019-06-23 | Stop reason: HOSPADM

## 2019-06-19 RX ORDER — NAPROXEN 250 MG/1
125 TABLET ORAL 2 TIMES DAILY WITH MEALS
Status: DISCONTINUED | OUTPATIENT
Start: 2019-06-19 | End: 2019-06-19

## 2019-06-19 RX ORDER — NAPROXEN 250 MG/1
375 TABLET ORAL 2 TIMES DAILY WITH MEALS
Status: DISCONTINUED | OUTPATIENT
Start: 2019-06-19 | End: 2019-06-19

## 2019-06-19 RX ORDER — GABAPENTIN 300 MG/1
300 CAPSULE ORAL AT BEDTIME
Status: DISCONTINUED | OUTPATIENT
Start: 2019-06-19 | End: 2019-06-19 | Stop reason: DRUGHIGH

## 2019-06-19 RX ORDER — TRAZODONE HYDROCHLORIDE 100 MG/1
100 TABLET ORAL
Status: DISCONTINUED | OUTPATIENT
Start: 2019-06-19 | End: 2019-06-23 | Stop reason: HOSPADM

## 2019-06-19 RX ORDER — SERTRALINE HYDROCHLORIDE 25 MG/1
25 TABLET, FILM COATED ORAL DAILY
Status: DISCONTINUED | OUTPATIENT
Start: 2019-06-19 | End: 2019-06-23 | Stop reason: HOSPADM

## 2019-06-19 RX ORDER — GABAPENTIN 300 MG/1
300 CAPSULE ORAL EVERY 12 HOURS
COMMUNITY
End: 2019-09-30

## 2019-06-19 RX ORDER — SERTRALINE HYDROCHLORIDE 25 MG/1
25 TABLET, FILM COATED ORAL DAILY
COMMUNITY
End: 2019-06-25

## 2019-06-19 RX ADMIN — ENOXAPARIN SODIUM 40 MG: 40 INJECTION SUBCUTANEOUS at 08:02

## 2019-06-19 RX ADMIN — METHYLPREDNISOLONE 40 MG: 40 INJECTION, POWDER, LYOPHILIZED, FOR SOLUTION INTRAMUSCULAR; INTRAVENOUS at 15:28

## 2019-06-19 RX ADMIN — TAZOBACTAM SODIUM AND PIPERACILLIN SODIUM 3.38 G: 375; 3 INJECTION, SOLUTION INTRAVENOUS at 18:26

## 2019-06-19 RX ADMIN — LIDOCAINE 1 PATCH: 560 PATCH PERCUTANEOUS; TOPICAL; TRANSDERMAL at 20:31

## 2019-06-19 RX ADMIN — IPRATROPIUM BROMIDE AND ALBUTEROL SULFATE 3 ML: .5; 3 SOLUTION RESPIRATORY (INHALATION) at 20:03

## 2019-06-19 RX ADMIN — NAPROXEN 250 MG: 250 TABLET ORAL at 18:25

## 2019-06-19 RX ADMIN — SERTRALINE HYDROCHLORIDE 25 MG: 25 TABLET ORAL at 15:28

## 2019-06-19 RX ADMIN — GABAPENTIN 300 MG: 300 CAPSULE ORAL at 20:31

## 2019-06-19 RX ADMIN — IPRATROPIUM BROMIDE AND ALBUTEROL SULFATE 3 ML: .5; 3 SOLUTION RESPIRATORY (INHALATION) at 15:10

## 2019-06-19 RX ADMIN — DICLOFENAC 2 G: 10 GEL TOPICAL at 08:29

## 2019-06-19 RX ADMIN — TAZOBACTAM SODIUM AND PIPERACILLIN SODIUM 3.38 G: 375; 3 INJECTION, SOLUTION INTRAVENOUS at 00:18

## 2019-06-19 RX ADMIN — ASPIRIN 325 MG: 325 TABLET, DELAYED RELEASE ORAL at 20:30

## 2019-06-19 RX ADMIN — ALPRAZOLAM 0.25 MG: 0.25 TABLET ORAL at 10:54

## 2019-06-19 RX ADMIN — AZITHROMYCIN 250 MG: 250 TABLET, FILM COATED ORAL at 08:02

## 2019-06-19 RX ADMIN — LEVOTHYROXINE SODIUM 75 MCG: 75 TABLET ORAL at 20:30

## 2019-06-19 RX ADMIN — TRAZODONE HYDROCHLORIDE 100 MG: 100 TABLET ORAL at 22:04

## 2019-06-19 RX ADMIN — TAZOBACTAM SODIUM AND PIPERACILLIN SODIUM 3.38 G: 375; 3 INJECTION, SOLUTION INTRAVENOUS at 06:18

## 2019-06-19 RX ADMIN — DICLOFENAC 2 G: 10 GEL TOPICAL at 16:37

## 2019-06-19 RX ADMIN — METHYLPREDNISOLONE 40 MG: 40 INJECTION, POWDER, LYOPHILIZED, FOR SOLUTION INTRAMUSCULAR; INTRAVENOUS at 03:29

## 2019-06-19 RX ADMIN — NAPROXEN 250 MG: 250 TABLET ORAL at 10:54

## 2019-06-19 RX ADMIN — ATORVASTATIN CALCIUM 20 MG: 20 TABLET, FILM COATED ORAL at 08:02

## 2019-06-19 RX ADMIN — IPRATROPIUM BROMIDE AND ALBUTEROL SULFATE 3 ML: .5; 3 SOLUTION RESPIRATORY (INHALATION) at 07:56

## 2019-06-19 RX ADMIN — TAZOBACTAM SODIUM AND PIPERACILLIN SODIUM 3.38 G: 375; 3 INJECTION, SOLUTION INTRAVENOUS at 12:45

## 2019-06-19 ASSESSMENT — ACTIVITIES OF DAILY LIVING (ADL)
ADLS_ACUITY_SCORE: 15
ADLS_ACUITY_SCORE: 16
ADLS_ACUITY_SCORE: 15

## 2019-06-19 NOTE — PLAN OF CARE
Denied pain, VS stable, O2 94 on 2L NC. BENJAMIN, Sit in chair, up to BR with SBA. ABX, Solumedrol IS and nebs to treat pneumonia.  Continue current POC.

## 2019-06-19 NOTE — PLAN OF CARE
One dark brown stool this am only.  Save being indep in room.  Lungs still dim with dyspnea with activity.  No edema noted.  History of copd.  Continue antibiotics for pneumonia.  Encourage use of IS.  Left hip discomfort relieved with napozyn  And valtarion cream

## 2019-06-19 NOTE — PLAN OF CARE
.Heart Failure Care Pathway  GOALS TO BE MET BEFORE DISCHARGE:    1. Decrease congestion and/or edema with diuretic therapy to achieve near      optimal volume status.            Dyspnea improved:  No, please explain: BENJAMIN             Edema improved:     Yes        Net I/O and Weights since admission:          05/20 0700 - 06/19 0659  In: 480 [P.O.:480]  Out: -   Net: 480            Vitals:    06/18/19 0204 06/18/19 0521   Weight: 43.1 kg (95 lb) 41.6 kg (91 lb 11.2 oz)         2.  O2 sats > 92% on RA or at prior home O2 therapy level.          Current oxygenation status:       SpO2: 95 %         O2 Device: Nasal cannula,  Oxygen Delivery: 1.5 LPM         Able to wean O2 this shift to keep sats > 92%:  No, please explain: 1.5L        Does patient use Home O2? Yes-  PRN O2     3.  Tolerates ambulation and mobility near baseline: No, please explain: BENJAMIN         How many times did the patient ambulate with nursing staff this shift? 1    Please review the Heart Failure Care Pathway for additional HF goal outcomes.    Bibiana Rocha RN  6/19/2019     Pt a/o x4, up with SBA.  BENJAMIN, fine crackles.  Zosyn Q6, solu-medrol, nebs.  Lives independently at the [facility]. 1.5L, uses O2 PRn at home.  Pt can make needs known, will continue POC.

## 2019-06-19 NOTE — PROGRESS NOTES
[facility]  Hospitalist Progress Note  Name: Marie Kline    MRN: [id number]  YOB: 1932    Age: 87 year old  Date of admission: 6/18/2019  Primary care provider: Piper Kiser      Reason for Stay (Diagnosis): Pneumonia         Assessment and Plan:      Summary of Stay:  Marie Kline is a pleasant 87-year-old female with extensive past medical history including chronic obstructive pulmonary disease on as-needed oxygen at home, cardiomyopathy, hypertension, hyperlipidemia, chronic lymphocytic leukemia, hypothyroidism, among others, who presented today with worsening shortness of breath of 2 days and unable to use her oxygen from her concentrator and came to the hospital and is being admitted after chest x-ray findings consistent with right lower lobe pneumonia and hypoxia.  Patient was admitted for further inpatient cares    Problem List/Plan:  1. Acute hypoxic respiratory failure secondary to pneumonia and COPD: Clinically improved and requiring minimal oxygen supplementation.  Wean off as able. Patient does report having PRN oxygen at home. Given risk of healthcare associated pneumonia, will continue Zosyn and azithromycin.  Await cultures.  2. History of COPD with acute exacerbation: Appears mild at this time without significant bronchospasms.  Will encourage pulmonary toileting as well as nebs.  Continue scheduled IV Solu-Medrol.  Consider tapering to p.o. prednisone tomorrow if no significant bronchospasms.  Holding patient's chronic Dulera while on stress dose IV steroids and scheduled nebs.  3. CLL: I did discuss the case with the patient's primary hematologist, Dr. Ivory.  Patient was due for IVIG infusion this morning but can be held and will follow up with her primary hematology clinic to reschedule her next IVIG infusion.  4. History of stress cardiomyopathy: Appears euvolemic.  Not an active issue at this time.  5. Hypertension: May resume patient's chronic  "Toprol-XL 25 mg p.o. daily tomorrow with parameters  6. Hyperlipidemia: May resume patient's chronic Lipitor 20 mg p.o. daily.  7. Hypothyroidism: Resume Synthroid 75 mcg p.o. daily.      DVT Prophylaxis: Lovenox  Code Status: DNR / DNI  Discharge Dispo: Back to usual prior living arrangement  Estimated Disch Date / # of Days until Disch: In 1 to 2 days if respiratory status is improved.  Time spent: Greater than 35 minutes      Interval History (Subjective):      Breathing improved.  Still reports an occasional cough.         Physical Exam:      Vital signs:  Temp: 97.3  F (36.3  C) Temp src: Oral BP: 115/88 Pulse: 85 Heart Rate: 75 Resp: 18 SpO2: 97 % O2 Device: Aerosol mask Oxygen Delivery: 1.5 LPM Height: 149.9 cm (4' 11\") Weight: 41.6 kg (91 lb 11.2 oz)  Estimated body mass index is 18.52 kg/m  as calculated from the following:    Height as of this encounter: 1.499 m (4' 11\").    Weight as of this encounter: 41.6 kg (91 lb 11.2 oz).    I/O last 3 completed shifts:  In: 480 [P.O.:480]  Out: -   Vitals:    06/18/19 0204 06/18/19 0521   Weight: 43.1 kg (95 lb) 41.6 kg (91 lb 11.2 oz)       Constitutional: Awake, alert, cooperative, no apparent distress   Respiratory: Nl work of breathing.  Diminished breath sounds right lung base with prolonged expiratory phase.  Mild and expiratory wheezes.   Cardiovascular: Regular rate and rhythm, normal S1 and S2, and no murmur noted   Abdomen: Normal bowel sounds, soft, non-distended, non-tender   Skin: No rashes, no cyanosis, dry to touch   Neuro: CN 2-12 intact, no localizing weakness   Extremities: No edema, normal range of motion   HEENT Normocephalic, atraumatic, normal nasal turbinates; oropharynx clear   Neck Supple; nl inspection; trachea midline; no thryomegaly   Psychiatric: A+O x3. Normal affect          Medications:      All current medications were reviewed with changes reflected in problem list.         Data:      All new lab and imaging data was reviewed. "   Labs:  Recent Labs   Lab 06/18/19  0313 06/18/19  0312   CULT No growth after 22 hours No growth after 22 hours     Recent Labs   Lab 06/19/19  0701 06/18/19  0629 06/18/19  0238   WBC 73.3*  --  108.0*   HGB 10.5*  --  11.5*   HCT 34.9*  --  37.7   MCV 94  --  94   * 132* 159     Recent Labs   Lab 06/19/19  0701 06/18/19  0629 06/18/19  0238     --  134   POTASSIUM 3.9  --  4.6   CHLORIDE 103  --  100   CO2 29  --  30   ANIONGAP 5  --  4   *  --  127*   BUN 14  --  7   CR 0.77 0.77 0.77   GFRESTIMATED 69 69 69   GFRESTBLACK 80 81 80   CARLOS 9.1  --  8.4*      Imaging:   No results found for this or any previous visit (from the past 24 hour(s)).    Kavin Moss -[phone]

## 2019-06-19 NOTE — CONSULTS
Care Transition Initial Assessment - RN        Met with: Patient.  DATA   Active Problems:    COPD exacerbation (H)       Cognitive Status: alert and oriented.  [facility]  Primary Care MD Name: Dr Kiser  Contact information and PCP information verified: Yes  Lives With: alone      Quality of Family Relationships: helpful, supportive  Description of Support System: Supportive, Involved   Who is your support system?: Children   Support Assessment: Adequate family and caregiver support   Insurance concerns: No Insurance issues identified  ASSESSMENT  Patient currently receives the following services:  Home O2 prn through [employer] (currently changing O2 companies)       Identified issues/concerns regarding health management: none, no gaps in care identified    CTS following for elevated CARLOS MANUEL score and COPD/PNA diagnosis. Met with pt at bedside to discuss plan of care. Pt verifies [facility]. She does not receive any services from [facility]. She manages her own meds, ADL's and uses a walker for ambulation. She still drives to her appts. Her daughter Margaret is supportive. She has home oxygen through [employer] that she uses prn at 1-2L. She states her concentrator was not working when she tried using her home oxygen prior to admission. Her daughter is meeting with the O2 company today in hopes to change to a new O2 supplier. Pt also has home nebs and inhalers that she uses appropriately. She follows closely with her Oncologist Dr Ivory and Pulmonologist Dr Courtney. Her PCP is Dr Kiser and she follows with Babs Garcia RN CC for [facility]. She will arrange her own follow up appts. Call placed to Babs to updated her on pt hospitalization. No gaps in care identified, pt anticipates discharging back to her [facility] in a few days.    PLAN  Patient/family is agreeable to the plan?  Yes  Patient anticipates discharging to the [facility] .        Patient anticipates needs for  home equipment: No  Transportation/person available to transport on day of discharge  is pt's daughter Margaret.     Plan/Disposition: Home   Appointments: pt will make her own follow up      Care  (CTS) will continue to follow as needed.    Cherise Galeano RN CTS  Care Transitions  [phone]

## 2019-06-19 NOTE — PROGRESS NOTES
[facility]  Palliative Care Progress Note  Text Page     Assessment & Plan   Marie Kline is a 87 year old female who was admitted on 6/18/2019. I was asked to see the patient for goals of care.     Recommendations:  1.Decisional Capacity -  Intact. Patient does not have an advance directive. Per  informed consent policy next of kin should be involved if she becomes unable.  Daughter Margaret is at bedside during assessment and stated she is patient's only child.  2. Pain-   Patient endorses left hip pain secondary to bursitis following hip replacement in November 2018.    - lidocaine patches apply at 8pm daily.  - diclofenac gel three times daily.  - Naproxen 375 mg BID scheduled.  3. Anxiety - likely related to shortness of breath, air hunger.  Patient and daughter describe symptom as intermittent or 'situational.'  Daughter endorses that her mother seems to have daily episodes of anxiety in relation to her COPD.  - Xanax tablet 0.25mg BID prn  4. Spiritual Care- Oriented to Spiritual Health and Social Work Services as part of Palliative Care team.   5. Care Planning-   - continue with current plan of care  - no indication for modification to current POLST on file.  - discharge back to [facility] when medically stable.     Goal of Care: Restorative, DNR/DNI, wishes to be treated for exacerbation and return to her previous place of residence.      Disease Process/es & Symptoms:  Marie Kline is a 87 year old patient with history of COPD on home O2, cardiomyopathy, hypertension, hyperlipidemia, anxiety, CLL, bladder Cancer, hypothyroidism admitted with symptoms of shortness of breath on 6/17/19. She has been treated for COPD exacerbation and for possible pneumonia with nebulizers and antibiotics.       This is in the setting of COPD and multiple comorbidities.  She has been hospitalized 5 times in the past 12 months for recurrent COPD exacerbations, NSTEMI and fall with resultant hip  fracture requiring repair. There is not reported or documented decline in patient's function.  There has been a decline in daily function including decreased endurance for which patient has needed to decrease her involvement in social activities at her residence.      Patient states she has not noticed a significant decline in her health.  When asked she does not view her hospitalizations as complications related to her chronic illness and does not feel she has had declines in her functional status or change in baseline status due to these hospitalizations.     Findings & plan of care discussed with: Lore Sellers, bedside nurse.  Follow-up plan from palliative team: will follow for symptom management tomorrow.  Thank you for involving us in the patient's care.    Na SONG, CNP  Pain Management and Palliative Care  [facility]  Pgr: [phone]    Time Spent on this Encounter   Total unit/floor time 25 (0900 - 0925) minutes, time consisted of the following, examination of the patient, reviewing the record and completing documentation. >50% of time spent in counseling and coordination of care.  Time spend counseling with patient consisted of the following topics, care planning for discharge and symptom management.  Time spent in coordination of care with Bedside Nurse Mary Jo Severance.     Interval History   Patient states she did not rest well last night as she had IV problems and multiple visits by nursing staff which disrupted her sleep.  States she continued to have hip pain when she laid on her left side in bed.  Stated she did not have any significant episodes of anxiety.    Course of Hospitalization Discussions Data   Met with patient and daughter Margaret at the bedside to assess goals of care and symptom management.  Patient was surprised to know that palliative care was consulted and mentioned that she 'is a fighter' and that her goal was to get back to her apartment.       Clarified  the definition of palliative care and the supportive role that PC can provide in the hospital and following discharge if she wishes.  Discussed that part of palliative care is end of life, but that is not the core of the service.  Patient verbalized understanding of the palliative care service and involvement for symptom management and supportive cares while hospitalized.       Patient and daughter recounted shortness of breath that led to hospitalization, daughter stated that she becomes quite anxious when she feels short of breath and that this gets her 'spinning'.  Patient acknowledged the same saying that she became 'overwhelmed' when she felt like she could not breath or did not have her oxygen.      Discussed previously established POLST form and desire for DNR/DNI code status, patient agreed that if something 'extreme' happened she would not wish to be kept alive by artificial means and that she would only be ok with temporary intubation or use of feeding tube.  If she were not able to recover she would not want those interventions started.       In review of other possible symptoms that related to quality of life, patient mentioned left hip pain that her surgeon called bursitis that has been bothering her since last November when she underwent hip replacement surgery.  She states that the pain is present when she walks, but does not hurt when she is sitting or stationary.  6/19 - patient with frustrating night due to interuptions in sleep and IV infiltration and discomfort secondary to that.  She states she continued to have hip pain.  No anxiety attacks or overwhelming feelings of anxiety.  She states she wishes to complete treatment for her pneumonia and then wishes to discharge back to her residence.  Denies other questions or concerns at time of assessment.    Review of Systems    CONSTITUTIONAL: NEGATIVE for fever, chills, change in weight  ENT/MOUTH: NEGATIVE for ear, mouth and throat problems  RESP:  NEGATIVE for significant cough or SOB  CV: NEGATIVE for chest pain, palpitations or peripheral edema    Palliative Symptom Review (0=no symptom/no concern, 1=mild, 2=moderate, 3=severe):      Pain: 1-mild      Fatigue: 0-none      Nausea: 0-none      Constipation: 0-none      Diarrhea: 0-none      Depressive Symptoms: 0-none      Anxiety: 1-mild      Drowsiness: 0-none      Poor Appetite: 0-none      Shortness of Breath: 1-mild      Insomnia: 0-none      Overall (0 good/no concerns, 3 very poor):  1-mild.    Physical Exam   Temp:  [97.1  F (36.2  C)-97.3  F (36.3  C)] 97.3  F (36.3  C)  Pulse:  [85] 85  Heart Rate:  [62-79] 75  Resp:  [16-18] 18  BP: (112-115)/(54-88) 115/88  SpO2:  [91 %-97 %] 97 %  91 lbs 11.2 oz  GEN:  Alert, oriented x 3, appears comfortable, NAD.  HEENT:  Normocephalic/atraumatic, no scleral icterus, no nasal discharge, mouth moist.  CV:  RRR, S1, S2; no murmurs or other irregularities noted.  +3 DP/PT pulses bilatererally; no edema BLE.  RESP:  Crackles in bilateral bases, no wheezing.  Slight use of accessory muscles noted.  ABD:  Rounded, soft, non-tender/non-distended.  +BS  EXT:  Edema & pulses as noted above.  CMS intact x 4.     M/S:   Tender to palpation over left hip.    SKIN:  Dry to touch, no exanthems noted in the visualized areas.    NEURO: Symmetric strength +5/5.  Sensation to touch intact all extremities.   There is no area of allodynia or hyperesthesia.  PAIN BEHAVIOR: Cooperative  Psych:  Normal affect.  Calm, cooperative, conversant appropriately.    Medications       aspirin  325 mg Oral QPM     atorvastatin  20 mg Oral Daily     azithromycin  250 mg Oral Daily     diclofenac  2 g Transdermal TID     enoxaparin  40 mg Subcutaneous Q24H     ipratropium - albuterol 0.5 mg/2.5 mg/3 mL  3 mL Nebulization 4x daily     levothyroxine  75 mcg Oral QPM     lidocaine  1 patch Transdermal Q24h    And     lidocaine   Transdermal Q24H    And     lidocaine   Transdermal Q8H      methylPREDNISolone  40 mg Intravenous Q12H     piperacillin-tazobactam  3.375 g Intravenous Q6H     senna-docusate  1 tablet Oral BID    Or     senna-docusate  2 tablet Oral BID     traZODone  100 mg Oral At Bedtime       Data   Last Comprehensive Metabolic Panel:  Sodium   Date Value Ref Range Status   06/19/2019 137 133 - 144 mmol/L Final     Potassium   Date Value Ref Range Status   06/19/2019 3.9 3.4 - 5.3 mmol/L Final     Chloride   Date Value Ref Range Status   06/19/2019 103 94 - 109 mmol/L Final     Carbon Dioxide   Date Value Ref Range Status   06/19/2019 29 20 - 32 mmol/L Final     Anion Gap   Date Value Ref Range Status   06/19/2019 5 3 - 14 mmol/L Final     Glucose   Date Value Ref Range Status   06/19/2019 136 (H) 70 - 99 mg/dL Final     Urea Nitrogen   Date Value Ref Range Status   06/19/2019 14 7 - 30 mg/dL Final     Creatinine   Date Value Ref Range Status   06/19/2019 0.77 0.52 - 1.04 mg/dL Final     GFR Estimate   Date Value Ref Range Status   06/19/2019 69 >60 mL/min/[1.73_m2] Final     Comment:     Non  GFR Calc  Starting 12/18/2018, serum creatinine based estimated GFR (eGFR) will be   calculated using the Chronic Kidney Disease Epidemiology Collaboration   (CKD-EPI) equation.       Calcium   Date Value Ref Range Status   06/19/2019 9.1 8.5 - 10.1 mg/dL Final     CBC RESULTS:   Recent Labs   Lab Test 06/19/19  0701   WBC 73.3*   RBC 3.71*   HGB 10.5*   HCT 34.9*   MCV 94   MCH 28.3   MCHC 30.1*   RDW 18.0*   *

## 2019-06-20 LAB
ERYTHROCYTE [DISTWIDTH] IN BLOOD BY AUTOMATED COUNT: 17.7 % (ref 10–15)
HCT VFR BLD AUTO: 30.4 % (ref 35–47)
HGB BLD-MCNC: 9.3 G/DL (ref 11.7–15.7)
LACTATE BLD-SCNC: 1.6 MMOL/L (ref 0.7–2)
MCH RBC QN AUTO: 29.2 PG (ref 26.5–33)
MCHC RBC AUTO-ENTMCNC: 30.6 G/DL (ref 31.5–36.5)
MCV RBC AUTO: 95 FL (ref 78–100)
PLATELET # BLD AUTO: 95 10E9/L (ref 150–450)
RBC # BLD AUTO: 3.19 10E12/L (ref 3.8–5.2)
WBC # BLD AUTO: 55.1 10E9/L (ref 4–11)

## 2019-06-20 PROCEDURE — 25000125 ZZHC RX 250: Performed by: INTERNAL MEDICINE

## 2019-06-20 PROCEDURE — 25000132 ZZH RX MED GY IP 250 OP 250 PS 637: Mod: GY | Performed by: NURSE PRACTITIONER

## 2019-06-20 PROCEDURE — 40000275 ZZH STATISTIC RCP TIME EA 10 MIN

## 2019-06-20 PROCEDURE — A9270 NON-COVERED ITEM OR SERVICE: HCPCS | Mod: GY | Performed by: INTERNAL MEDICINE

## 2019-06-20 PROCEDURE — 85027 COMPLETE CBC AUTOMATED: CPT | Performed by: INTERNAL MEDICINE

## 2019-06-20 PROCEDURE — 36415 COLL VENOUS BLD VENIPUNCTURE: CPT | Performed by: INTERNAL MEDICINE

## 2019-06-20 PROCEDURE — 12000000 ZZH R&B MED SURG/OB

## 2019-06-20 PROCEDURE — 25000132 ZZH RX MED GY IP 250 OP 250 PS 637: Mod: GY | Performed by: INTERNAL MEDICINE

## 2019-06-20 PROCEDURE — 25000131 ZZH RX MED GY IP 250 OP 636 PS 637: Mod: GY | Performed by: INTERNAL MEDICINE

## 2019-06-20 PROCEDURE — 25000128 H RX IP 250 OP 636: Performed by: INTERNAL MEDICINE

## 2019-06-20 PROCEDURE — 83605 ASSAY OF LACTIC ACID: CPT | Performed by: INTERNAL MEDICINE

## 2019-06-20 PROCEDURE — 99233 SBSQ HOSP IP/OBS HIGH 50: CPT | Performed by: INTERNAL MEDICINE

## 2019-06-20 PROCEDURE — 94640 AIRWAY INHALATION TREATMENT: CPT

## 2019-06-20 PROCEDURE — 99232 SBSQ HOSP IP/OBS MODERATE 35: CPT | Performed by: NURSE PRACTITIONER

## 2019-06-20 PROCEDURE — A9270 NON-COVERED ITEM OR SERVICE: HCPCS | Mod: GY | Performed by: NURSE PRACTITIONER

## 2019-06-20 RX ORDER — GABAPENTIN 300 MG/1
300 CAPSULE ORAL AT BEDTIME
Status: DISCONTINUED | OUTPATIENT
Start: 2019-06-20 | End: 2019-06-23 | Stop reason: HOSPADM

## 2019-06-20 RX ORDER — AMOXICILLIN AND CLAVULANATE POTASSIUM 500; 125 MG/1; MG/1
1 TABLET, FILM COATED ORAL EVERY 8 HOURS SCHEDULED
Status: DISCONTINUED | OUTPATIENT
Start: 2019-06-20 | End: 2019-06-23 | Stop reason: HOSPADM

## 2019-06-20 RX ORDER — PREDNISONE 20 MG/1
40 TABLET ORAL 2 TIMES DAILY
Status: DISCONTINUED | OUTPATIENT
Start: 2019-06-20 | End: 2019-06-21

## 2019-06-20 RX ADMIN — ASPIRIN 325 MG: 325 TABLET, DELAYED RELEASE ORAL at 20:37

## 2019-06-20 RX ADMIN — TRAZODONE HYDROCHLORIDE 100 MG: 100 TABLET ORAL at 21:59

## 2019-06-20 RX ADMIN — TAZOBACTAM SODIUM AND PIPERACILLIN SODIUM 3.38 G: 375; 3 INJECTION, SOLUTION INTRAVENOUS at 01:50

## 2019-06-20 RX ADMIN — ENOXAPARIN SODIUM 40 MG: 40 INJECTION SUBCUTANEOUS at 09:52

## 2019-06-20 RX ADMIN — LEVOTHYROXINE SODIUM 75 MCG: 75 TABLET ORAL at 20:37

## 2019-06-20 RX ADMIN — SERTRALINE HYDROCHLORIDE 25 MG: 25 TABLET ORAL at 09:51

## 2019-06-20 RX ADMIN — PREDNISONE 40 MG: 20 TABLET ORAL at 20:38

## 2019-06-20 RX ADMIN — AMOXICILLIN AND CLAVULANATE POTASSIUM 1 TABLET: 500; 125 TABLET, FILM COATED ORAL at 21:59

## 2019-06-20 RX ADMIN — NAPROXEN 250 MG: 250 TABLET ORAL at 09:51

## 2019-06-20 RX ADMIN — NAPROXEN 250 MG: 250 TABLET ORAL at 17:38

## 2019-06-20 RX ADMIN — GABAPENTIN 300 MG: 300 CAPSULE ORAL at 22:00

## 2019-06-20 RX ADMIN — IPRATROPIUM BROMIDE AND ALBUTEROL SULFATE 3 ML: .5; 3 SOLUTION RESPIRATORY (INHALATION) at 08:00

## 2019-06-20 RX ADMIN — DICLOFENAC 2 G: 10 GEL TOPICAL at 09:51

## 2019-06-20 RX ADMIN — AZITHROMYCIN 250 MG: 250 TABLET, FILM COATED ORAL at 09:51

## 2019-06-20 RX ADMIN — FERROUS SULFATE TAB 325 MG (65 MG ELEMENTAL FE) 325 MG: 325 (65 FE) TAB at 09:51

## 2019-06-20 RX ADMIN — ATORVASTATIN CALCIUM 20 MG: 20 TABLET, FILM COATED ORAL at 09:51

## 2019-06-20 RX ADMIN — LIDOCAINE 1 PATCH: 560 PATCH PERCUTANEOUS; TOPICAL; TRANSDERMAL at 20:37

## 2019-06-20 RX ADMIN — AMOXICILLIN AND CLAVULANATE POTASSIUM 1 TABLET: 500; 125 TABLET, FILM COATED ORAL at 14:12

## 2019-06-20 RX ADMIN — TAZOBACTAM SODIUM AND PIPERACILLIN SODIUM 3.38 G: 375; 3 INJECTION, SOLUTION INTRAVENOUS at 06:17

## 2019-06-20 RX ADMIN — METOPROLOL SUCCINATE 25 MG: 25 TABLET, EXTENDED RELEASE ORAL at 09:52

## 2019-06-20 RX ADMIN — METHYLPREDNISOLONE 40 MG: 40 INJECTION, POWDER, LYOPHILIZED, FOR SOLUTION INTRAMUSCULAR; INTRAVENOUS at 02:41

## 2019-06-20 ASSESSMENT — ACTIVITIES OF DAILY LIVING (ADL)
ADLS_ACUITY_SCORE: 16
ADLS_ACUITY_SCORE: 15

## 2019-06-20 NOTE — PLAN OF CARE
Hx: Pt was admitted on 06/18 for SOB and hypoxemia. It was later found that the Pt had a COPD exacerbation in addition to RLL PNA revealed on a CXR. PMH includes pulmonary edema, stress cardiomyopathy, HTN, HLD, chronic lymphocytic leukemia and hypothyroidism.   Orientation: A&OX4, Ambulation: SBA/Independent on evening shift.  V/S: VSS, Pt is on RA , Pain: Occasional pain on the L. Hip (Pt has a previous Hx of a L hip repair.  Tele/CV: S1,S2 noted.  LS: Diminished throughout.  : Voids spontaneously.  GI: Active & Audible X4. LBM was today (as stated by the Pt).  IV: SL, flushes well.  Plan: Return to [facility] in 1 - 2 days pending respiratory status improvement.

## 2019-06-20 NOTE — PHARMACY-ADMISSION MEDICATION HISTORY
Pt on monthly IVIG through [facility] for CLL.  Called [facility], confirmed dosing and last dose.  Next dose was scheduled for 6/19/19.  Updated PTA med list w/IVIG.

## 2019-06-20 NOTE — PLAN OF CARE
A&Ox4. VSS. Denies pain and SOB. LS diminished w/ crackles in RML and RLL. 91% 02 on 1.5 L. Continues on IV Zosyn Q6H and Solu-Medrol Q12H. Up w/ A1, GB and walker. Regular diet. Palliative following.

## 2019-06-20 NOTE — PROGRESS NOTES
[facility]  Hospitalist Progress Note  Name: Marie Kline    MRN: [id number]  YOB: 1932    Age: 87 year old  Date of admission: 6/18/2019  Primary care provider: Piper Kiser      Reason for Stay (Diagnosis): Pneumonia         Assessment and Plan:      Summary of Stay:  Marie Kline is a pleasant 87-year-old female with extensive past medical history including chronic obstructive pulmonary disease on as-needed oxygen at home, cardiomyopathy, hypertension, hyperlipidemia, chronic lymphocytic leukemia, hypothyroidism, among others, who presented today with worsening shortness of breath of 2 days and unable to use her oxygen from her concentrator and came to the hospital and is being admitted after chest x-ray findings consistent with right lower lobe pneumonia and hypoxia.  Patient was admitted for further inpatient cares    Problem List/Plan:  1. Acute hypoxic respiratory failure secondary to pneumonia and COPD: Clinically improved and requiring minimal oxygen supplementation.  Wean off as able. Patient does report having PRN oxygen at home. Given risk of healthcare associated pneumonia, was initially started on Zosyn and azithromycin.  Now the patient is clinically improved, will discontinue Zosyn and transition to p.o. Augmentin.  Finish 5-day course of azithromycin.  2. History of COPD with acute exacerbation: Appears mild at this time without significant bronchospasms.  Will encourage pulmonary toileting as well as nebs.  Will discontinue IV Solu-Medrol and transition to p.o. prednisone.  Suspect short course.  Holding patient's chronic Dulera while on steroids and scheduled nebs.  Encourage pulmonary toileting.  3. CLL: I did discuss the case with the patient's primary hematologist, Dr. Ivory on 6/19/2019.  Patient was due for IVIG infusion this on 6/19/2019 but can be held and will follow up with her primary hematology clinic to reschedule her next IVIG  "infusion.  4. History of stress cardiomyopathy: Appears euvolemic.  Not an active issue at this time.  5. Hypertension: resume patient's chronic Toprol-XL 25 mg p.o. daily tomorrow with parameters  6. Hyperlipidemia: resume patient's chronic Lipitor 20 mg p.o. daily.  7. Hypothyroidism: Resume Synthroid 75 mcg p.o. daily.      DVT Prophylaxis: Lovenox  Code Status: DNR / DNI  Discharge Dispo: Back to usual prior living arrangement  Estimated Disch Date / # of Days until Disch: In 1 to 2 days if respiratory status is improved.  Time spent: Greater than 35 minutes      Interval History (Subjective):      Breathing continues to improve.  Still some notable desats on room air.         Physical Exam:      Vital signs:  Temp: 96.7  F (35.9  C) Temp src: Oral BP: 135/63 Pulse: 89 Heart Rate: 81 Resp: 18 SpO2: (!) 89 % O2 Device: None (Room air) Oxygen Delivery: 1.5 LPM Height: 149.9 cm (4' 11\") Weight: 41.6 kg (91 lb 11.2 oz)  Estimated body mass index is 18.52 kg/m  as calculated from the following:    Height as of this encounter: 1.499 m (4' 11\").    Weight as of this encounter: 41.6 kg (91 lb 11.2 oz).    I/O last 3 completed shifts:  In: 480 [P.O.:480]  Out: -   Vitals:    06/18/19 0204 06/18/19 0521   Weight: 43.1 kg (95 lb) 41.6 kg (91 lb 11.2 oz)       Constitutional: Awake, alert, cooperative, no apparent distress   Respiratory: Nl work of breathing.  Diminished breath sounds right lung base with prolonged expiratory phase.  No notable wheezes   Cardiovascular: Regular rate and rhythm, normal S1 and S2, and no murmur noted   Abdomen: Normal bowel sounds, soft, non-distended, non-tender   Skin: No rashes, no cyanosis, dry to touch   Neuro: CN 2-12 intact, no localizing weakness   Extremities: No edema, normal range of motion   HEENT Normocephalic, atraumatic, normal nasal turbinates; oropharynx clear   Neck Supple; nl inspection; trachea midline; no thryomegaly   Psychiatric: A+O x3. Normal affect          " Medications:      All current medications were reviewed with changes reflected in problem list.         Data:      All new lab and imaging data was reviewed.   Labs:  Recent Labs   Lab 06/18/19  0313 06/18/19  0312   CULT No growth after 2 days No growth after 2 days     Recent Labs   Lab 06/20/19  0735 06/19/19  0701 06/18/19  0629 06/18/19  0238   WBC 55.1* 73.3*  --  108.0*   HGB 9.3* 10.5*  --  11.5*   HCT 30.4* 34.9*  --  37.7   MCV 95 94  --  94   PLT 95* 117* 132* 159     Recent Labs   Lab 06/19/19  0701 06/18/19  0629 06/18/19  0238     --  134   POTASSIUM 3.9  --  4.6   CHLORIDE 103  --  100   CO2 29  --  30   ANIONGAP 5  --  4   *  --  127*   BUN 14  --  7   CR 0.77 0.77 0.77   GFRESTIMATED 69 69 69   GFRESTBLACK 80 81 80   CARLOS 9.1  --  8.4*      Imaging:   No results found for this or any previous visit (from the past 24 hour(s)).    Kavin Moss -[phone]

## 2019-06-20 NOTE — PLAN OF CARE
"VS /63   Pulse 89   Temp 96.7  F (35.9  C) (Oral)   Resp 18   Ht 1.499 m (4' 11\")   Wt 41.6 kg (91 lb 11.2 oz)   SpO2 (!) 89%   BMI 18.52 kg/m    Lung sounds crackles on R side  O2 1.5L nasal cannula--weaning on/off throughout the day  Bowel sounds active  Tolerating regular diet  IVF saline locked  CMS intact  Activity up with stand by assist  Pain denies  Patient/family centered care patient involved with medical rounding today  Discharge plan transitioned prednisone to po as well as zosyn to augmentin    "

## 2019-06-20 NOTE — PROGRESS NOTES
[facility]  Palliative Care Progress Note  Text Page     Assessment & Plan   Marie Kline is a 87 year old female who was admitted on 6/18/2019. I was asked to see the patient for goals of care.     Recommendations:  1.Decisional Capacity -  Intact. Patient does not have an advance directive. Per  informed consent policy next of kin should be involved if she becomes unable.  Daughter Margaret is at bedside during assessment and stated she is patient's only child.  2. Pain-   Patient endorses left hip pain secondary to bursitis following hip replacement in November 2018.    - lidocaine patches apply at 8pm daily.  - diclofenac gel three times daily.  - Naproxen 250 mg TID scheduled.  - Gabapentin, patient clarified home dose and order modified:  300 mg daily at bedtime.  3. Anxiety - likely related to shortness of breath, air hunger.  Patient and daughter describe symptom as intermittent or 'situational.'  Daughter endorses that her mother seems to have daily episodes of anxiety in relation to her COPD.  - Xanax tablet 0.25mg BID prn  4. Spiritual Care- Oriented to Spiritual Health and Social Work Services as part of Palliative Care team. Appreciate cares and input by noel Murphy.  5. Care Planning-   - continue with current plan of care  - no indication for modification to current POLST on file.  - discharge back to [facility] when medically stable.     Goal of Care: Restorative, DNR/DNI, wishes to be treated for COPD exacerbation and pneumonia and return to her previous place of residence.      Disease Process/es & Symptoms:  Marie Kline is a 87 year old patient with history of COPD on home O2, cardiomyopathy, hypertension, hyperlipidemia, anxiety, CLL, bladder Cancer, hypothyroidism admitted with symptoms of shortness of breath on 6/17/19. She has been treated for COPD exacerbation and for possible pneumonia with nebulizers and antibiotics.       This is in the setting of  COPD and multiple comorbidities.  She has been hospitalized 5 times in the past 12 months for recurrent COPD exacerbations, NSTEMI and fall with resultant hip fracture requiring repair. There is not reported or documented decline in patient's function.  There has been a decline in daily function including decreased endurance for which patient has needed to decrease her involvement in social activities at her residence.      Patient states she has not noticed a significant decline in her health.  When asked she does not view her hospitalizations as complications related to her chronic illness and does not feel she has had declines in her functional status or change in baseline status due to these hospitalizations.     Findings & plan of care discussed with: Lore Sellers, bedside nurse.  Follow-up plan from palliative team: will follow for symptom management tomorrow.  Thank you for involving us in the patient's care.    Na SONG, CNP  Pain Management and Palliative Care  [facility]  Pgr: [phone]    Time Spent on this Encounter   Total unit/floor time 25 (1005 - 1030) minutes, time consisted of the following, examination of the patient, reviewing the record and completing documentation. >50% of time spent in counseling and coordination of care.  Time spend counseling with patient consisted of the following topics, care planning for discharge and symptom management.  Time spent in coordination of care with Bedside Nurse Mary Jo Severance.     Interval History   Patient states she feels better today, states she had a better night.  Clarified that she was only taking gabapentin at bedtime.  Denies questions and is feeling better and more ready to discharge home.    Course of Hospitalization Discussions Data   Met with patient and daughter Margaret at the bedside to assess goals of care and symptom management.  Patient was surprised to know that palliative care was consulted and mentioned that  she 'is a fighter' and that her goal was to get back to her apartment.       Clarified the definition of palliative care and the supportive role that PC can provide in the hospital and following discharge if she wishes.  Discussed that part of palliative care is end of life, but that is not the core of the service.  Patient verbalized understanding of the palliative care service and involvement for symptom management and supportive cares while hospitalized.       Patient and daughter recounted shortness of breath that led to hospitalization, daughter stated that she becomes quite anxious when she feels short of breath and that this gets her 'spinning'.  Patient acknowledged the same saying that she became 'overwhelmed' when she felt like she could not breath or did not have her oxygen.      Discussed previously established POLST form and desire for DNR/DNI code status, patient agreed that if something 'extreme' happened she would not wish to be kept alive by artificial means and that she would only be ok with temporary intubation or use of feeding tube.  If she were not able to recover she would not want those interventions started.       In review of other possible symptoms that related to quality of life, patient mentioned left hip pain that her surgeon called bursitis that has been bothering her since last November when she underwent hip replacement surgery.  She states that the pain is present when she walks, but does not hurt when she is sitting or stationary.  6/19 - patient with frustrating night due to interuptions in sleep and IV infiltration and discomfort secondary to that.  She states she continued to have hip pain.  No anxiety attacks or overwhelming feelings of anxiety.  She states she wishes to complete treatment for her pneumonia and then wishes to discharge back to her residence.  Denies other questions or concerns at time of assessment.  6/20: patient stating she is feeling better, notes improved  pain and shortness of breath since initiation of pain meds and antibiotics.   Denies questions.    Review of Systems    CONSTITUTIONAL: NEGATIVE for fever, chills, change in weight  ENT/MOUTH: NEGATIVE for ear, mouth and throat problems  RESP: NEGATIVE for significant cough or SOB  CV: NEGATIVE for chest pain, palpitations or peripheral edema    Palliative Symptom Review (0=no symptom/no concern, 1=mild, 2=moderate, 3=severe):      Pain: 1-mild      Fatigue: 0-none      Nausea: 0-none      Constipation: 0-none      Diarrhea: 0-none      Depressive Symptoms: 0-none      Anxiety: 1-mild      Drowsiness: 0-none      Poor Appetite: 0-none      Shortness of Breath: 1-mild      Insomnia: 0-none      Overall (0 good/no concerns, 3 very poor):  1-mild.    Physical Exam   Temp:  [96.7  F (35.9  C)-97.4  F (36.3  C)] 96.7  F (35.9  C)  Pulse:  [89] 89  Heart Rate:  [66-89] 66  Resp:  [18] 18  BP: (102-126)/(49-56) 126/56  SpO2:  [89 %-96 %] 89 %  91 lbs 11.2 oz  GEN:  Alert, oriented x 3, appears comfortable, NAD.  HEENT:  Normocephalic/atraumatic, no scleral icterus, no nasal discharge, mouth moist.  CV:  RRR, S1, S2; no murmurs or other irregularities noted.  +3 DP/PT pulses bilatererally; no edema BLE.  RESP:  Crackles in bilateral bases, no wheezing.  Slight use of accessory muscles noted.  ABD:  Rounded, soft, non-tender/non-distended.  +BS  EXT:  Edema & pulses as noted above.  CMS intact x 4.     M/S:   Tender to palpation over left hip.    SKIN:  Dry to touch, no exanthems noted in the visualized areas.    NEURO: Symmetric strength +5/5.  Sensation to touch intact all extremities.   There is no area of allodynia or hyperesthesia.  PAIN BEHAVIOR: Cooperative  Psych:  Normal affect.  Calm, cooperative, conversant appropriately.    Medications       aspirin  325 mg Oral QPM     atorvastatin  20 mg Oral Daily     azithromycin  250 mg Oral Daily     diclofenac  2 g Transdermal TID     enoxaparin  40 mg Subcutaneous Q24H      ferrous sulfate  325 mg Oral Daily with breakfast     gabapentin  300 mg Oral BID     ipratropium - albuterol 0.5 mg/2.5 mg/3 mL  3 mL Nebulization 4x daily     levothyroxine  75 mcg Oral QPM     lidocaine  1 patch Transdermal Q24h    And     lidocaine   Transdermal Q24H    And     lidocaine   Transdermal Q8H     methylPREDNISolone  40 mg Intravenous Q12H     metoprolol succinate ER  25 mg Oral Daily     naproxen  250 mg Oral TID w/meals     piperacillin-tazobactam  3.375 g Intravenous Q6H     senna-docusate  1 tablet Oral BID    Or     senna-docusate  2 tablet Oral BID     sertraline  25 mg Oral Daily     traZODone  100 mg Oral At Bedtime       Data   Last Comprehensive Metabolic Panel:  Sodium   Date Value Ref Range Status   06/19/2019 137 133 - 144 mmol/L Final     Potassium   Date Value Ref Range Status   06/19/2019 3.9 3.4 - 5.3 mmol/L Final     Chloride   Date Value Ref Range Status   06/19/2019 103 94 - 109 mmol/L Final     Carbon Dioxide   Date Value Ref Range Status   06/19/2019 29 20 - 32 mmol/L Final     Anion Gap   Date Value Ref Range Status   06/19/2019 5 3 - 14 mmol/L Final     Glucose   Date Value Ref Range Status   06/19/2019 136 (H) 70 - 99 mg/dL Final     Urea Nitrogen   Date Value Ref Range Status   06/19/2019 14 7 - 30 mg/dL Final     Creatinine   Date Value Ref Range Status   06/19/2019 0.77 0.52 - 1.04 mg/dL Final     GFR Estimate   Date Value Ref Range Status   06/19/2019 69 >60 mL/min/[1.73_m2] Final     Comment:     Non  GFR Calc  Starting 12/18/2018, serum creatinine based estimated GFR (eGFR) will be   calculated using the Chronic Kidney Disease Epidemiology Collaboration   (CKD-EPI) equation.       Calcium   Date Value Ref Range Status   06/19/2019 9.1 8.5 - 10.1 mg/dL Final     CBC RESULTS:   Recent Labs   Lab Test 06/20/19  0735   WBC 55.1*   RBC 3.19*   HGB 9.3*   HCT 30.4*   MCV 95   MCH 29.2   MCHC 30.6*   RDW 17.7*   PLT 95*

## 2019-06-21 LAB
CREAT SERPL-MCNC: 0.7 MG/DL (ref 0.52–1.04)
ERYTHROCYTE [DISTWIDTH] IN BLOOD BY AUTOMATED COUNT: 17.8 % (ref 10–15)
GFR SERPL CREATININE-BSD FRML MDRD: 78 ML/MIN/{1.73_M2}
HCT VFR BLD AUTO: 31.8 % (ref 35–47)
HGB BLD-MCNC: 9.7 G/DL (ref 11.7–15.7)
MCH RBC QN AUTO: 29 PG (ref 26.5–33)
MCHC RBC AUTO-ENTMCNC: 30.5 G/DL (ref 31.5–36.5)
MCV RBC AUTO: 95 FL (ref 78–100)
PLATELET # BLD AUTO: 119 10E9/L (ref 150–450)
RBC # BLD AUTO: 3.34 10E12/L (ref 3.8–5.2)
WBC # BLD AUTO: 60.7 10E9/L (ref 4–11)

## 2019-06-21 PROCEDURE — 25000125 ZZHC RX 250: Performed by: INTERNAL MEDICINE

## 2019-06-21 PROCEDURE — 94640 AIRWAY INHALATION TREATMENT: CPT | Mod: 76

## 2019-06-21 PROCEDURE — 12000000 ZZH R&B MED SURG/OB

## 2019-06-21 PROCEDURE — 99233 SBSQ HOSP IP/OBS HIGH 50: CPT | Performed by: INTERNAL MEDICINE

## 2019-06-21 PROCEDURE — 25000131 ZZH RX MED GY IP 250 OP 636 PS 637: Mod: GY | Performed by: INTERNAL MEDICINE

## 2019-06-21 PROCEDURE — 25000132 ZZH RX MED GY IP 250 OP 250 PS 637: Mod: GY | Performed by: INTERNAL MEDICINE

## 2019-06-21 PROCEDURE — 25000132 ZZH RX MED GY IP 250 OP 250 PS 637: Mod: GY | Performed by: NURSE PRACTITIONER

## 2019-06-21 PROCEDURE — 82565 ASSAY OF CREATININE: CPT | Performed by: INTERNAL MEDICINE

## 2019-06-21 PROCEDURE — 40000275 ZZH STATISTIC RCP TIME EA 10 MIN

## 2019-06-21 PROCEDURE — 25000128 H RX IP 250 OP 636: Performed by: INTERNAL MEDICINE

## 2019-06-21 PROCEDURE — 85027 COMPLETE CBC AUTOMATED: CPT | Performed by: INTERNAL MEDICINE

## 2019-06-21 PROCEDURE — 36415 COLL VENOUS BLD VENIPUNCTURE: CPT | Performed by: INTERNAL MEDICINE

## 2019-06-21 PROCEDURE — 94640 AIRWAY INHALATION TREATMENT: CPT

## 2019-06-21 RX ORDER — PREDNISONE 20 MG/1
40 TABLET ORAL DAILY
Status: DISCONTINUED | OUTPATIENT
Start: 2019-06-22 | End: 2019-06-23 | Stop reason: HOSPADM

## 2019-06-21 RX ADMIN — ATORVASTATIN CALCIUM 20 MG: 20 TABLET, FILM COATED ORAL at 08:51

## 2019-06-21 RX ADMIN — METOPROLOL SUCCINATE 25 MG: 25 TABLET, EXTENDED RELEASE ORAL at 08:51

## 2019-06-21 RX ADMIN — ASPIRIN 325 MG: 325 TABLET, DELAYED RELEASE ORAL at 22:27

## 2019-06-21 RX ADMIN — LIDOCAINE 1 PATCH: 560 PATCH PERCUTANEOUS; TOPICAL; TRANSDERMAL at 22:25

## 2019-06-21 RX ADMIN — AMOXICILLIN AND CLAVULANATE POTASSIUM 1 TABLET: 500; 125 TABLET, FILM COATED ORAL at 22:27

## 2019-06-21 RX ADMIN — DICLOFENAC 2 G: 10 GEL TOPICAL at 16:16

## 2019-06-21 RX ADMIN — IPRATROPIUM BROMIDE AND ALBUTEROL SULFATE 3 ML: .5; 3 SOLUTION RESPIRATORY (INHALATION) at 07:34

## 2019-06-21 RX ADMIN — NAPROXEN 250 MG: 250 TABLET ORAL at 17:33

## 2019-06-21 RX ADMIN — IPRATROPIUM BROMIDE AND ALBUTEROL SULFATE 3 ML: .5; 3 SOLUTION RESPIRATORY (INHALATION) at 20:58

## 2019-06-21 RX ADMIN — PREDNISONE 40 MG: 20 TABLET ORAL at 08:51

## 2019-06-21 RX ADMIN — NAPROXEN 250 MG: 250 TABLET ORAL at 08:51

## 2019-06-21 RX ADMIN — IPRATROPIUM BROMIDE AND ALBUTEROL SULFATE 3 ML: .5; 3 SOLUTION RESPIRATORY (INHALATION) at 12:04

## 2019-06-21 RX ADMIN — AMOXICILLIN AND CLAVULANATE POTASSIUM 1 TABLET: 500; 125 TABLET, FILM COATED ORAL at 05:42

## 2019-06-21 RX ADMIN — TRAZODONE HYDROCHLORIDE 100 MG: 100 TABLET ORAL at 22:27

## 2019-06-21 RX ADMIN — ENOXAPARIN SODIUM 40 MG: 40 INJECTION SUBCUTANEOUS at 08:53

## 2019-06-21 RX ADMIN — DICLOFENAC 2 G: 10 GEL TOPICAL at 08:54

## 2019-06-21 RX ADMIN — NAPROXEN 250 MG: 250 TABLET ORAL at 12:55

## 2019-06-21 RX ADMIN — FERROUS SULFATE TAB 325 MG (65 MG ELEMENTAL FE) 325 MG: 325 (65 FE) TAB at 08:51

## 2019-06-21 RX ADMIN — ALPRAZOLAM 0.25 MG: 0.25 TABLET ORAL at 12:55

## 2019-06-21 RX ADMIN — LEVOTHYROXINE SODIUM 75 MCG: 75 TABLET ORAL at 22:27

## 2019-06-21 RX ADMIN — AMOXICILLIN AND CLAVULANATE POTASSIUM 1 TABLET: 500; 125 TABLET, FILM COATED ORAL at 12:56

## 2019-06-21 RX ADMIN — SERTRALINE HYDROCHLORIDE 25 MG: 25 TABLET ORAL at 08:52

## 2019-06-21 RX ADMIN — GABAPENTIN 300 MG: 300 CAPSULE ORAL at 22:27

## 2019-06-21 RX ADMIN — AZITHROMYCIN 250 MG: 250 TABLET, FILM COATED ORAL at 08:52

## 2019-06-21 ASSESSMENT — ACTIVITIES OF DAILY LIVING (ADL)
ADLS_ACUITY_SCORE: 15
ADLS_ACUITY_SCORE: 15
ADLS_ACUITY_SCORE: 16
ADLS_ACUITY_SCORE: 15
ADLS_ACUITY_SCORE: 14
ADLS_ACUITY_SCORE: 15

## 2019-06-21 NOTE — PLAN OF CARE
A&O. VSS. Denies pain. On 0.5 L O2 overnight. O2 87-89% on room air. LS crackles to bases. No IV access. On PO antibiotics and steroids. Up SBA. Possible discharge today back to [facility].  Zaria Booker RN on 6/21/2019 at 4:42 AM

## 2019-06-21 NOTE — PLAN OF CARE
Pt was admitted on 06/18 for SOB and hypoxemia. It was later found that the Pt had a COPD exacerbation in addition to RLL PNA revealed on a CXR. PMH includes pulmonary edema, stress cardiomyopathy, HTN, HLD, chronic lymphocytic leukemia and hypothyroidism.   Orientation: A&OX4, Ambulation: SBA/Independent on evening shift.  V/S: VSS, Pt is on RA , Pain: Occasional pain on the L. Hip (Pt has a previous Hx of a L hip repair.  Tele/CV: S1,S2 noted.  LS: Crackles on the lower lobes.  : Voids spontaneously.  GI: Soft/loose stools during shift. Active & audible X4. Pt declines abdominal pain or major changes in stools.  IV: Removed, MD aware. Pt is on PO Rx.  Plan: Return to [facility] tomorrow (06/21) pending respiratory improvement.

## 2019-06-21 NOTE — PROGRESS NOTES
[facility]  Hospitalist Progress Note  Name: Marie Kline    MRN: [id number]  YOB: 1932    Age: 87 year old  Date of admission: 6/18/2019  Primary care provider: Piper Kiser      Reason for Stay (Diagnosis): Pneumonia         Assessment and Plan:      Summary of Stay:  Marie Kline is a pleasant 87-year-old female with extensive past medical history including chronic obstructive pulmonary disease on as-needed oxygen at home, cardiomyopathy, hypertension, hyperlipidemia, chronic lymphocytic leukemia, hypothyroidism, among others, who presented today with worsening shortness of breath of 2 days and unable to use her oxygen from her concentrator and came to the hospital and is being admitted after chest x-ray findings consistent with right lower lobe pneumonia and hypoxia.  Patient was admitted for further inpatient cares    Problem List/Plan:  1. Acute hypoxic respiratory failure secondary to pneumonia and COPD: Clinically improved and requiring minimal oxygen supplementation.  Wean off as able. Patient does report having PRN oxygen at home. Given risk of healthcare associated pneumonia, was initially started on Zosyn and azithromycin.  Now the patient is clinically improved, Zosyn was discontinued on 6/20/2019 and transition to p.o. Augmentin.  Finish 5-day course of azithromycin.  2. History of COPD with acute exacerbation: Appears mild at this time without significant bronchospasms.  Will encourage pulmonary toileting as well as nebs.  Patient was on IV Solu-Medrol but was transitioned to to p.o. prednisone.  Suspect short course.  Holding patient's chronic Dulera while on steroids and scheduled nebs.  Encourage pulmonary toileting.  3. CLL: I did discuss the case with the patient's primary hematologist, Dr. Ivory on 6/19/2019.  Patient was due for IVIG infusion this on 6/19/2019 but can be held and will follow up with her primary hematology clinic to reschedule her  "next IVIG infusion.  4. History of stress cardiomyopathy: Appears euvolemic.  Not an active issue at this time.  5. Hypertension: resume patient's chronic Toprol-XL 25 mg p.o. daily tomorrow with parameters  6. Hyperlipidemia: resume patient's chronic Lipitor 20 mg p.o. daily.  7. Hypothyroidism: Resume Synthroid 75 mcg p.o. daily.      DVT Prophylaxis: Lovenox  Code Status: DNR / DNI  Discharge Dispo: Back to usual prior living arrangement  Estimated Disch Date / # of Days until Disch: Tomorrow if respiratory status is stable.  Notably, patient does have PRN O2 at home but may need continuous short-term oxygen although just on 1 L at this time.  Time spent: Greater than 35 minutes      Interval History (Subjective):      Breathing continues to improve.  Still some notable desats on room air.         Physical Exam:      Vital signs:  Temp: 97.3  F (36.3  C) Temp src: Oral BP: 115/58   Heart Rate: 71 Resp: 20 SpO2: 96 % O2 Device: Nasal cannula Oxygen Delivery: 1 LPM Height: 149.9 cm (4' 11\") Weight: 41.6 kg (91 lb 11.2 oz)  Estimated body mass index is 18.52 kg/m  as calculated from the following:    Height as of this encounter: 1.499 m (4' 11\").    Weight as of this encounter: 41.6 kg (91 lb 11.2 oz).    I/O last 3 completed shifts:  In: 440 [P.O.:440]  Out: -   Vitals:    06/18/19 0204 06/18/19 0521   Weight: 43.1 kg (95 lb) 41.6 kg (91 lb 11.2 oz)       Constitutional: Awake, alert, cooperative, no apparent distress   Respiratory: Nl work of breathing.  Diminished breath sounds right lung base with prolonged expiratory phase.  No notable wheezes   Cardiovascular: Regular rate and rhythm, normal S1 and S2, and no murmur noted   Abdomen: Normal bowel sounds, soft, non-distended, non-tender   Skin: No rashes, no cyanosis, dry to touch   Neuro: CN 2-12 intact, no localizing weakness   Extremities: No edema, normal range of motion   HEENT Normocephalic, atraumatic, normal nasal turbinates; oropharynx clear   Neck " Supple; nl inspection; trachea midline; no thryomegaly   Psychiatric: A+O x3. Normal affect          Medications:      All current medications were reviewed with changes reflected in problem list.         Data:      All new lab and imaging data was reviewed.   Labs:  Recent Labs   Lab 06/18/19  0313 06/18/19  0312   CULT No growth after 3 days No growth after 3 days     Recent Labs   Lab 06/21/19  0637 06/20/19  0735 06/19/19  0701   WBC 60.7* 55.1* 73.3*   HGB 9.7* 9.3* 10.5*   HCT 31.8* 30.4* 34.9*   MCV 95 95 94   * 95* 117*     Recent Labs   Lab 06/21/19  0637 06/19/19  0701 06/18/19  0629 06/18/19  0238   NA  --  137  --  134   POTASSIUM  --  3.9  --  4.6   CHLORIDE  --  103  --  100   CO2  --  29  --  30   ANIONGAP  --  5  --  4   GLC  --  136*  --  127*   BUN  --  14  --  7   CR 0.70 0.77 0.77 0.77   GFRESTIMATED 78 69 69 69   GFRESTBLACK >90 80 81 80   CARLOS  --  9.1  --  8.4*      Imaging:   No results found for this or any previous visit (from the past 24 hour(s)).    Kavin Moss [phone]

## 2019-06-21 NOTE — PLAN OF CARE
Dx: COPD exac    Vitals: Temp: 97.3  F (36.3  C) Temp src: Oral BP: 115/58   Heart Rate: 71 Resp: 20 SpO2: 96 % O2 Device: Nasal cannula Oxygen Delivery: 1 LPM    Pain: chronic left hip pain 2/2 replacement-scheduled lidocaine patch, scheduled naproxen.  Neuro: A&Ox4, forgetful at times  Cardiac: no telemetry  Resp: RA-87% to 94&. Intermittent supplemental oxygen at 1L. Uses supplemental oxygen at home as needed.  GI/: WNL  Diet: Regular  Labs: WBC chronically elevated, plt 119.  Skin: bruising  Activity: SBA  IV: no PIV    PLAN: Discharge back to [place] tomorrow. Continues on PO prednisone for COPD exac and PO Augmentin for RLL PNA. PPX: Lovenox. Ambulate as tolerated. Encourage self-care.

## 2019-06-22 LAB
ERYTHROCYTE [DISTWIDTH] IN BLOOD BY AUTOMATED COUNT: 17.9 % (ref 10–15)
HCT VFR BLD AUTO: 31.7 % (ref 35–47)
HGB BLD-MCNC: 9.6 G/DL (ref 11.7–15.7)
MCH RBC QN AUTO: 29.1 PG (ref 26.5–33)
MCHC RBC AUTO-ENTMCNC: 30.3 G/DL (ref 31.5–36.5)
MCV RBC AUTO: 96 FL (ref 78–100)
PLATELET # BLD AUTO: 119 10E9/L (ref 150–450)
RBC # BLD AUTO: 3.3 10E12/L (ref 3.8–5.2)
WBC # BLD AUTO: 69.7 10E9/L (ref 4–11)

## 2019-06-22 PROCEDURE — 25000125 ZZHC RX 250: Performed by: INTERNAL MEDICINE

## 2019-06-22 PROCEDURE — 94640 AIRWAY INHALATION TREATMENT: CPT | Mod: 76

## 2019-06-22 PROCEDURE — 25000132 ZZH RX MED GY IP 250 OP 250 PS 637: Mod: GY | Performed by: INTERNAL MEDICINE

## 2019-06-22 PROCEDURE — 85027 COMPLETE CBC AUTOMATED: CPT | Performed by: INTERNAL MEDICINE

## 2019-06-22 PROCEDURE — 25000131 ZZH RX MED GY IP 250 OP 636 PS 637: Mod: GY | Performed by: INTERNAL MEDICINE

## 2019-06-22 PROCEDURE — 94640 AIRWAY INHALATION TREATMENT: CPT

## 2019-06-22 PROCEDURE — 40000274 ZZH STATISTIC RCP CONSULT EA 30 MIN

## 2019-06-22 PROCEDURE — 99232 SBSQ HOSP IP/OBS MODERATE 35: CPT | Performed by: INTERNAL MEDICINE

## 2019-06-22 PROCEDURE — 25000128 H RX IP 250 OP 636: Performed by: INTERNAL MEDICINE

## 2019-06-22 PROCEDURE — 12000000 ZZH R&B MED SURG/OB

## 2019-06-22 PROCEDURE — 36415 COLL VENOUS BLD VENIPUNCTURE: CPT | Performed by: INTERNAL MEDICINE

## 2019-06-22 PROCEDURE — 40000275 ZZH STATISTIC RCP TIME EA 10 MIN

## 2019-06-22 PROCEDURE — 25000132 ZZH RX MED GY IP 250 OP 250 PS 637: Mod: GY | Performed by: NURSE PRACTITIONER

## 2019-06-22 RX ORDER — LACTOBACILLUS RHAMNOSUS GG 10B CELL
1 CAPSULE ORAL 2 TIMES DAILY
Status: DISCONTINUED | OUTPATIENT
Start: 2019-06-22 | End: 2019-06-23 | Stop reason: HOSPADM

## 2019-06-22 RX ADMIN — TRAZODONE HYDROCHLORIDE 100 MG: 100 TABLET ORAL at 22:18

## 2019-06-22 RX ADMIN — AZITHROMYCIN 250 MG: 250 TABLET, FILM COATED ORAL at 09:03

## 2019-06-22 RX ADMIN — LIDOCAINE 1 PATCH: 560 PATCH PERCUTANEOUS; TOPICAL; TRANSDERMAL at 22:18

## 2019-06-22 RX ADMIN — IPRATROPIUM BROMIDE AND ALBUTEROL SULFATE 3 ML: .5; 3 SOLUTION RESPIRATORY (INHALATION) at 20:33

## 2019-06-22 RX ADMIN — IPRATROPIUM BROMIDE AND ALBUTEROL SULFATE 3 ML: .5; 3 SOLUTION RESPIRATORY (INHALATION) at 15:44

## 2019-06-22 RX ADMIN — SERTRALINE HYDROCHLORIDE 25 MG: 25 TABLET ORAL at 09:04

## 2019-06-22 RX ADMIN — LEVOTHYROXINE SODIUM 75 MCG: 75 TABLET ORAL at 20:21

## 2019-06-22 RX ADMIN — METOPROLOL SUCCINATE 25 MG: 25 TABLET, EXTENDED RELEASE ORAL at 09:03

## 2019-06-22 RX ADMIN — ASPIRIN 325 MG: 325 TABLET, DELAYED RELEASE ORAL at 20:21

## 2019-06-22 RX ADMIN — PREDNISONE 40 MG: 20 TABLET ORAL at 14:47

## 2019-06-22 RX ADMIN — ATORVASTATIN CALCIUM 20 MG: 20 TABLET, FILM COATED ORAL at 09:03

## 2019-06-22 RX ADMIN — DICLOFENAC 2 G: 10 GEL TOPICAL at 09:04

## 2019-06-22 RX ADMIN — GABAPENTIN 300 MG: 300 CAPSULE ORAL at 22:18

## 2019-06-22 RX ADMIN — NAPROXEN 250 MG: 250 TABLET ORAL at 17:53

## 2019-06-22 RX ADMIN — IPRATROPIUM BROMIDE AND ALBUTEROL SULFATE 3 ML: .5; 3 SOLUTION RESPIRATORY (INHALATION) at 08:33

## 2019-06-22 RX ADMIN — ENOXAPARIN SODIUM 40 MG: 40 INJECTION SUBCUTANEOUS at 09:02

## 2019-06-22 RX ADMIN — AMOXICILLIN AND CLAVULANATE POTASSIUM 1 TABLET: 500; 125 TABLET, FILM COATED ORAL at 14:48

## 2019-06-22 RX ADMIN — Medication 1 CAPSULE: at 20:21

## 2019-06-22 RX ADMIN — AMOXICILLIN AND CLAVULANATE POTASSIUM 1 TABLET: 500; 125 TABLET, FILM COATED ORAL at 22:18

## 2019-06-22 RX ADMIN — AMOXICILLIN AND CLAVULANATE POTASSIUM 1 TABLET: 500; 125 TABLET, FILM COATED ORAL at 05:36

## 2019-06-22 RX ADMIN — Medication 1 CAPSULE: at 09:09

## 2019-06-22 RX ADMIN — FERROUS SULFATE TAB 325 MG (65 MG ELEMENTAL FE) 325 MG: 325 (65 FE) TAB at 09:03

## 2019-06-22 RX ADMIN — NAPROXEN 250 MG: 250 TABLET ORAL at 09:03

## 2019-06-22 RX ADMIN — NAPROXEN 250 MG: 250 TABLET ORAL at 14:48

## 2019-06-22 ASSESSMENT — ACTIVITIES OF DAILY LIVING (ADL)
ADLS_ACUITY_SCORE: 14

## 2019-06-22 NOTE — PLAN OF CARE
Ambulated in halls with staff-O2 sats 92% RA at rest, 89-93% RA with activity. After walking O2 sats 90% RA.     Dx: COPD exac    Vitals: Temp: 96.5  F (35.8  C) Temp src: Oral BP: 156/79 Pulse: 84 Heart Rate: 87 Resp: 18 SpO2: 90 % O2 Device: None (Room air) Oxygen Delivery: 1 LPM    Pain: right neck discomfort this AM d/t sleeping position-Voltaren cream applied with relief. Denies left hip pain.   Neuro: A&Ox4, forgetful at times  Cardiac: WNL-no tele   Resp: see above note.   GI/: Voiding, loose/soft BM's-antibiotic induced-probiotic started.  Diet: Regular  Labs: Blood cx NTD, WBC 69.7 (chronically elevated), Hgb 9.6, plt 119  Skin: bruising  Activity: up ind in room, SBA in hallways  IV: no PIV    PLAN: COPD exac-continues on PO Prednisone, RLL PNA-continues on PO Augmentin. PPX: Lovenox. Encourage self-care. Encourage use of IS. Probiotic started for loose/soft BM's.

## 2019-06-22 NOTE — PROGRESS NOTES
"Date:6/22/2019  Admission Dx: COPD Exacerbation  Pulmonary History: COPD  Home Nebulizer/MDI Use: Dulera BID, Albuterol Q6   Home Oxygen:prn    Acuity Level (RCAT flow sheet): 3    Aerosol Therapy initiated: Duoneb QID, Albuterol prn    Pulmonary Hygiene initiated: Deep breathe and cough    Volume Expansion initiated: IS    Current Oxygen Requirements: Room air     Current SpO2:90%     Re-evaluation date: June 25     Patient Education: Patient informed of medication benefits and possible side effects.    See \"RT Assessments\" flow sheet for patient assessment scoring and Acuity Level Details.           "

## 2019-06-22 NOTE — PLAN OF CARE
Pt is A&O x4.  SBA/Ind.  Has no PIV.  Lidocaine patch on left hip.  Pt denies pain.  Has not needed and oxygen and has been stating appropriately on room air. PO Augmentin and prednisone. Lovenox.  Pt can discharge today if respiratory status is stable.Pt may need PRN O2 for short term usage.  Continue plan of care and monitor.

## 2019-06-22 NOTE — PROGRESS NOTES
[facility]    Medicine Progress Note - Hospitalist Service       Date of Admission:  6/18/2019  Assessment & Plan   Marie Kline is a 87 year old female admitted on 6/18/2019. She presented with weakness and increased dyspnea      1: Acute hypoxic respiratory failure due to COPD exacerbation and left sided pneumonia. Significantly improved and no fever for the last 4 days and now on oral antibiotics and steroids. She lives alone and has good family support, and has been walking in the [place]. Will work on that again today.  2: Diarrhea, antibiotic induced.  Will monitor for possible c diff and will start a probiotic today .  If this can be controlled, she could be discharged  3: CLL, stable  4: History of recurrent stress cardiomyopathy, stable at this time  5: Anxiety disorder  6: hypertension, treated and controlled  7: Hyperlipidemia, treated  8: Hypothyroidism, treated  9: Chronic Obstructive Lung disease. She has an O2 concentrator at home which she does not use regularly. We discussed that the survival advantage of home oxygen only occurs if it is used continuously.  Will check SaO2 readings while walking today. Possibly discharge tomorrow.        Diet: Combination Diet Regular Diet Adult    DVT Prophylaxis: Enoxaparin (Lovenox) SQ  Rees Catheter: not present  Code Status: DNR/DNI      Disposition Plan   Expected discharge: Tomorrow, recommended to prior living arrangement once strong enough to be discharged and diarrhea situation under better control..  Entered: Mayur Gallardo MD 06/22/2019, 8:29 AM       The patient's care was discussed with the Patient and Patient's Family.    Mayur Gallardo MD  Hospitalist Service  [facility]    ______________________________________________________________________    Interval History   Mrs. Kline is doing better. She feels her breathing is better. She is still coughing and has a productive cough, but her sputum is no longer colored. No  chest pain. She has oxygen at home and uses it if she feels she needs it.  She has no headache today, is eating and voiding ok. No dysuria. No sores in her mouth, she had problems with what sounds like herpes simplex in 5/2019.  No abdominal pain, nausea or vomiting but has diarrhea. The nurses say it does not smell like C diff.  She is no longer getting stool softeners.     Data reviewed today: I reviewed all medications, new labs and imaging results over the last 24 hours. I personally reviewed the chest x-ray image(s) showing showing hyper inflation and a left mid lung infiltrate that was not there in 5/2019.    Physical Exam   Vital Signs: Temp: 97.5  F (36.4  C) Temp src: Oral BP: 142/70 Pulse: 84 Heart Rate: 71 Resp: 16 SpO2: 92 % O2 Device: None (Room air) Oxygen Delivery: 1 LPM  Weight: 91 lbs 11.2 oz  General Appearance: Pleasant but anxious, small, thin woman.   Respiratory: a few rales in the left anterior lung field, no wheezing.  Her SaO2 in bed on ambient air was                 92 and 93%.    Cardiovascular: regular rhythm, normal S1 and S2, no murmurs or S3, no edema, calves not tender  GI: normal bowel sounds, not tender  Skin: no rashes  Other: Cranial nerves II  Through XII are intact  she moves all extremities equally, she has a fast say to her movements. Coordination is intact.  Mouth is without lesions     Data   Recent Labs   Lab 06/22/19  0657 06/21/19  0637 06/20/19  0735 06/19/19  0701 06/18/19  0629 06/18/19  0238   WBC 69.7* 60.7* 55.1* 73.3*  --  108.0*   HGB 9.6* 9.7* 9.3* 10.5*  --  11.5*   MCV 96 95 95 94  --  94   * 119* 95* 117* 132* 159   NA  --   --   --  137  --  134   POTASSIUM  --   --   --  3.9  --  4.6   CHLORIDE  --   --   --  103  --  100   CO2  --   --   --  29  --  30   BUN  --   --   --  14  --  7   CR  --  0.70  --  0.77 0.77 0.77   ANIONGAP  --   --   --  5  --  4   CARLOS  --   --   --  9.1  --  8.4*   GLC  --   --   --  136*  --  127*     No results found for  this or any previous visit (from the past 24 hour(s)).

## 2019-06-23 VITALS
HEIGHT: 59 IN | OXYGEN SATURATION: 92 % | DIASTOLIC BLOOD PRESSURE: 71 MMHG | SYSTOLIC BLOOD PRESSURE: 158 MMHG | RESPIRATION RATE: 18 BRPM | HEART RATE: 72 BPM | BODY MASS INDEX: 18.48 KG/M2 | WEIGHT: 91.7 LBS | TEMPERATURE: 96.9 F

## 2019-06-23 PROCEDURE — 25000132 ZZH RX MED GY IP 250 OP 250 PS 637: Mod: GY | Performed by: INTERNAL MEDICINE

## 2019-06-23 PROCEDURE — 99239 HOSP IP/OBS DSCHRG MGMT >30: CPT | Performed by: INTERNAL MEDICINE

## 2019-06-23 PROCEDURE — 25000125 ZZHC RX 250: Performed by: INTERNAL MEDICINE

## 2019-06-23 PROCEDURE — 25000132 ZZH RX MED GY IP 250 OP 250 PS 637: Mod: GY | Performed by: NURSE PRACTITIONER

## 2019-06-23 PROCEDURE — 40000275 ZZH STATISTIC RCP TIME EA 10 MIN

## 2019-06-23 PROCEDURE — 94640 AIRWAY INHALATION TREATMENT: CPT

## 2019-06-23 PROCEDURE — 25000131 ZZH RX MED GY IP 250 OP 636 PS 637: Mod: GY | Performed by: INTERNAL MEDICINE

## 2019-06-23 RX ORDER — LACTOBACILLUS RHAMNOSUS GG 10B CELL
1 CAPSULE ORAL 2 TIMES DAILY
Qty: 20 CAPSULE | Refills: 0 | Status: SHIPPED | OUTPATIENT
Start: 2019-06-23 | End: 2019-07-15

## 2019-06-23 RX ORDER — PREDNISONE 20 MG/1
TABLET ORAL
Qty: 20 TABLET | Refills: 0 | Status: SHIPPED | OUTPATIENT
Start: 2019-06-23 | End: 2019-07-15

## 2019-06-23 RX ORDER — AMOXICILLIN AND CLAVULANATE POTASSIUM 500; 125 MG/1; MG/1
1 TABLET, FILM COATED ORAL EVERY 8 HOURS
Qty: 12 TABLET | Refills: 0 | Status: SHIPPED | OUTPATIENT
Start: 2019-06-23 | End: 2019-07-15

## 2019-06-23 RX ADMIN — SERTRALINE HYDROCHLORIDE 25 MG: 25 TABLET ORAL at 08:29

## 2019-06-23 RX ADMIN — METOPROLOL SUCCINATE 25 MG: 25 TABLET, EXTENDED RELEASE ORAL at 08:29

## 2019-06-23 RX ADMIN — IPRATROPIUM BROMIDE AND ALBUTEROL SULFATE 3 ML: .5; 3 SOLUTION RESPIRATORY (INHALATION) at 10:29

## 2019-06-23 RX ADMIN — FERROUS SULFATE TAB 325 MG (65 MG ELEMENTAL FE) 325 MG: 325 (65 FE) TAB at 08:29

## 2019-06-23 RX ADMIN — NAPROXEN 250 MG: 250 TABLET ORAL at 13:39

## 2019-06-23 RX ADMIN — AMOXICILLIN AND CLAVULANATE POTASSIUM 1 TABLET: 500; 125 TABLET, FILM COATED ORAL at 13:40

## 2019-06-23 RX ADMIN — Medication 1 CAPSULE: at 08:29

## 2019-06-23 RX ADMIN — PREDNISONE 40 MG: 20 TABLET ORAL at 13:39

## 2019-06-23 RX ADMIN — DICLOFENAC 2 G: 10 GEL TOPICAL at 08:31

## 2019-06-23 RX ADMIN — ATORVASTATIN CALCIUM 20 MG: 20 TABLET, FILM COATED ORAL at 08:30

## 2019-06-23 RX ADMIN — NAPROXEN 250 MG: 250 TABLET ORAL at 08:29

## 2019-06-23 RX ADMIN — AMOXICILLIN AND CLAVULANATE POTASSIUM 1 TABLET: 500; 125 TABLET, FILM COATED ORAL at 05:59

## 2019-06-23 ASSESSMENT — ACTIVITIES OF DAILY LIVING (ADL)
ADLS_ACUITY_SCORE: 14
ADLS_ACUITY_SCORE: 16
ADLS_ACUITY_SCORE: 16
ADLS_ACUITY_SCORE: 14

## 2019-06-23 NOTE — PLAN OF CARE
Pt is A&O x4.  Independent in room and SBA is hallways.  VSS.  No PIV.  Pt denies pain.  Possible antibiotic induced diarrhea.  PO Augmentin and prednisone.   Probiotic started.  Possible discharge today if diarrhea is under control.  Continue plan of care and monitor.

## 2019-06-23 NOTE — PROGRESS NOTES
Spoke with patient regarding home care and she said she would like the agency she had after her previous hospitalization which was [facility].  Referral made. Patient to discharge to [facility].

## 2019-06-23 NOTE — PROGRESS NOTES
Pt discharged to home/[facility] with family as transport. Discharge instructions reviewed with pt and family-copy given. Culturelle with pt upon discharge, Augmentin and Prednisone to be picked up by pt upon discharge.

## 2019-06-23 NOTE — DISCHARGE SUMMARY
[facility]  Hospitalist Discharge Summary       Date of Admission:  6/18/2019  Date of Discharge:  6/23/2019  Discharging Provider: Mayur Gallardo MD      Discharge Diagnoses   1: Acute hypoxic respiratory failure due to COPD exacerbation and left sided pneumonia.   2: Diarrhea, antibiotic induced.  no signs of c diff at this time  3: CLL, stable  4: History of recurrent stress cardiomyopathy, stable at this time, no evidence of coronary artery disease  5: Anxiety disorder, treated  6: hypertension, treated and controlled  7: Hyperlipidemia, treated  8: Hypothyroidism, treated  9: Chronic Obstructive Lung disease.   Follow-ups Needed After Discharge   To evaluate her response to being home for a few days. I have concerns that she does not have enough support in her present living situation. Also, her daughter and the nurses note anxiety and I have a question if her Sertraline dose is adequate. She does not meet criteria for home oxygen at rest or with activity at the time of discharge, that can change. She should have a repeat CXR in 6 weeks to confirm resolution of her infiltrate    Unresulted Labs Ordered in the Past 30 Days of this Admission     Date and Time Order Name Status Description    6/18/2019 0238 Blood culture Preliminary     6/18/2019 0238 Blood culture Preliminary       These results will be followed up by Dr. Piper Kiser    Discharge Disposition   Discharged to home  Condition at discharge: Stable    Hospital Course   Marie Kline is a 87 year old female admitted on 6/18/2019. She was admitted with weakness and increased dyspnea. She was found to have acute hypoxic respiratory failure due to an exacerbation of chronic obstructive lung disease and a left lingular infiltrate consistent with pneumonia that was not there on the cxr of 5/7/2019. She responded to prednisone, antibiotics, oxygen and inhaled medicines.  She developed diarrhea due to the antibiotics but no signs of C diff  and is seemingly better with a probiotic. That needs close follow up.  The other medical conditions are stable and the treatment is the same.   Her daughter expressed concerns about her anxiety. She has been able to walk and maintain her oxygen levels. She is frail and weak and she lives alone at the [facility] in [place] and I have concerns that she has adequate support there. I've asked for a home care RN to see her and for her to see Dr. Kiser in about 5 days. She will also have her infusion for CLL with Dr. Ivory.           Consultations This Hospital Stay   PHARMACY TO DOSE [facility]  RESPIRATORY CARE IP CONSULT  PALLIATIVE CARE ADULT IP CONSULT  CARE COORDINATOR IP CONSULT    Code Status   DNR/DNI    Time Spent on this Encounter   I, Mayur Gallardo, personally saw the patient today and spent greater than 30 minutes discharging this patient.       Mayur Gallardo MD  [facility]  ______________________________________________________________________    Physical Exam   Vital Signs: Temp: 96.9  F (36.1  C) Temp src: Oral BP: 158/71 Pulse: 72 Heart Rate: 75 Resp: 18 SpO2: 94 % O2 Device: None (Room air)    Weight: 91 lbs 11.2 oz  General Appearance: Alert, cooperative and cheerful  Respiratory: clear to A&P, no wheezing or rales  Cardiovascular: regular rhythm, normal S1 and S2, no S3 or murmurs, no edema  GI: not tender, increased bowel sounds  Skin: some chronic bruising on her legs  Other: Cranial nerves II  Through XII are intact  she is somewhat weak and can have some difficulty standing initially. She walks well, but may benefit from a walker. She has no localizing weakness and her coordination is intact.        Primary Care Physician   Piper Kiser    Discharge Orders      Medication Therapy Management Referral      Home care nursing referral      Reason for your hospital stay    Mrs Kline: You came to the hospital with shortness of breath and the admission evaluation found that you had  a worsening of your chronic lung disease and a left sided pneumonia. You have responded well to therapy and your oxygen levels in your blood are good at rest and even when walking. Due to that, at this time, you do not need to use the oxygen. If you start getting short of breath again, you can start the oxygen but it is also important that you seek medical help if that lasts longer than one to two hours.  You will have your infusion for your chronic leukemia this week  I also want you to see Dr. Kiser no later than this Friday.  She will manage the prednisone dose as you should be able to wean that and stop it.  Your daughter mentioned your anxiety.  You are on a low dose of Sertraline for that and you and Dr. Kiser should decide if an increase in that dose is best for you.  We also discussed that if you get sick again quickly that it is likely that your home environment demands more of you than what your body can deliver and you may need more help at home.  You other medicines are the same. The diarrhea should be better with the probiotic and also finishing your antibiotics. Best of luck to you.     Activity    Your activity upon discharge: activity as tolerated     MD face to face encounter    Documentation of Face to Face and Certification for Home Health Services    I certify that patient: Marie Kline is under my care and that I, or a nurse practitioner or physician's assistant working with me, had a face-to-face encounter that meets the physician face-to-face encounter requirements with this patient on: 6/23/2019.    This encounter with the patient was in whole, or in part, for the following medical condition, which is the primary reason for home health care: Her chronic lung disease and CLL, heart disease and hypothyroidism.    I certify that, based on my findings, the following services are medically necessary home health services: Nursing.    My clinical findings support the need for the above  services because: Nurse is needed: To provide assessment and oversight required in the home to assure adherence to the medical plan due to: her weakened state..    Further, I certify that my clinical findings support that this patient is homebound (i.e. absences from home require considerable and taxing effort and are for medical reasons or Christianity services or infrequently or of short duration when for other reasons) because: Leaving home is medically contraindicated for the following reason(s): Dyspnea on exertion that makes it so they cannot leave their home for needed services without clinical deterioration...    Based on the above findings. I certify that this patient is confined to the home and needs intermittent skilled nursing care, physical therapy and/or speech therapy.  The patient is under my care, and I have initiated the establishment of the plan of care.  This patient will be followed by a physician who will periodically review the plan of care.  Physician/Provider to provide follow up care: Piper Kiser    Attending hospital physician (the Medicare certified [place] provider): Mayur Gallardo  Physician Signature: See electronic signature associated with these discharge orders.  Date: 6/23/2019     DNR/DNI     Diet    Follow this diet upon discharge: Orders Placed This Encounter      Snacks/Supplements Adult: Boost Plus; With Meals      Room Service      Combination Diet Regular Diet Adult       Significant Results and Procedures   Most Recent 3 CBC's:  Recent Labs   Lab Test 06/22/19  0657 06/21/19  0637 06/20/19  0735   WBC 69.7* 60.7* 55.1*   HGB 9.6* 9.7* 9.3*   MCV 96 95 95   * 119* 95*     Most Recent 3 BMP's:  Recent Labs   Lab Test 06/21/19  0637 06/19/19  0701 06/18/19  0629 06/18/19  0238 05/13/19  0644   NA  --  137  --  134 135   POTASSIUM  --  3.9  --  4.6 5.2   CHLORIDE  --  103  --  100 97   CO2  --  29  --  30 37*   BUN  --  14  --  7 27   CR 0.70 0.77 0.77 0.77 0.76    ANIONGAP  --  5  --  4 1*   CARLOS  --  9.1  --  8.4* 8.8   GLC  --  136*  --  127* 82   ,   Results for orders placed or performed during the hospital encounter of 06/18/19   XR Chest 2 Views    Narrative    CHEST TWO VIEWS 6/18/2019 3:22 AM     HISTORY: Shortness of breath, fever.    COMPARISON: 5/7/2019.    FINDINGS: There is abnormal interstitial opacity in the posterior  lateral right lower lobe. There is also a wedge of abnormal opacity in  the lingula of the left upper lobe. No pneumothorax or pleural  effusion. The lungs are hyperexpanded. Heart size is normal. No  pneumothorax.      Impression    IMPRESSION: Possible developing right lower lobe pneumonia. Wedge of  opacity in the lingula of the left upper lobe may represent an  additional site of pneumonia or atelectasis.     PADMINI MOULTON MD       Discharge Medications   Current Discharge Medication List      START taking these medications    Details   amoxicillin-clavulanate (AUGMENTIN) 500-125 MG tablet Take 1 tablet by mouth every 8 hours  Qty: 12 tablet, Refills: 0    Associated Diagnoses: Pneumonia of left lung due to infectious organism, unspecified part of lung      lactobacillus rhamnosus, GG, (CULTURELL) capsule Take 1 capsule by mouth 2 times daily  Qty: 20 capsule, Refills: 0    Associated Diagnoses: Pneumonia of left lung due to infectious organism, unspecified part of lung      predniSONE (DELTASONE) 20 MG tablet Two po qam for 2 days, then 1 po qam  Qty: 20 tablet, Refills: 0    Associated Diagnoses: COPD exacerbation (H)         CONTINUE these medications which have NOT CHANGED    Details   albuterol (PROAIR HFA/PROVENTIL HFA/VENTOLIN HFA) 108 (90 BASE) MCG/ACT Inhaler Inhale 2 puffs into the lungs every 6 hours as needed for wheezing  Qty: 1 Inhaler, Refills: 8    Associated Diagnoses: Intermittent asthma without complication      albuterol (PROVENTIL) (2.5 MG/3ML) 0.083% neb solution Take 2.5 mg by nebulization every 6 hours as needed  for shortness of breath / dyspnea or wheezing      aspirin 325 MG EC tablet Take 1 tablet (325 mg) by mouth daily  Qty: 45 tablet, Refills: 0    Associated Diagnoses: Fracture of hip, left, closed, initial encounter (H)      atorvastatin (LIPITOR) 20 MG tablet TAKE ONE TABLET BY MOUTH ONE TIME DAILY  Qty: 90 tablet, Refills: 0    Associated Diagnoses: ACS (acute coronary syndrome) (H); Hyperlipidemia with target LDL less than 130      calcium carbonate-vitamin D (OS-CARLOS) 500-400 MG-UNIT tablet Take 1 tablet by mouth daily      ferrous sulfate (IRON) 325 (65 FE) MG tablet Take 325 mg by mouth daily (with breakfast)       gabapentin (NEURONTIN) 300 MG capsule Take 300 mg by mouth 2 times daily      Immune Globulin, Human, (OCTAGAM IV) Inject 300 mg/kg into the vein every 30 days      levothyroxine (SYNTHROID/LEVOTHROID) 75 MCG tablet TAKE ONE TABLET BY MOUTH ONE TIME DAILY  Qty: 90 tablet, Refills: 0    Associated Diagnoses: Other specified hypothyroidism      metoprolol succinate (TOPROL-XL) 25 MG 24 hr tablet Take 1 tablet (25 mg) by mouth daily  Qty: 90 tablet, Refills: 3    Associated Diagnoses: ACS (acute coronary syndrome) (H)      mometasone-formoterol (DULERA) 200-5 MCG/ACT inhaler Inhale 2 puffs into the lungs 2 times daily      Multiple Vitamins-Minerals (MULTIVITAMIN GUMMIES ADULT PO) Take 2 tablets by mouth daily       sertraline (ZOLOFT) 25 MG tablet Take 25 mg by mouth daily      traMADol (ULTRAM) 50 MG tablet Take 50 mg by mouth every 6 hours as needed for moderate to severe pain       traZODone (DESYREL) 50 MG tablet Take 100 mg by mouth At Bedtime           Allergies   Allergies   Allergen Reactions     Diagnostic X-Ray Materials Hives     CT DYE     Contrast Dye Hives     CT DYE     Prolia [Denosumab]      Osteonecrosis jaw       Wound Dressing Adhesive

## 2019-06-23 NOTE — PLAN OF CARE
"/71 (BP Location: Left arm)   Pulse 72   Temp 96.9  F (36.1  C) (Oral)   Resp 18   Ht 1.499 m (4' 11\")   Wt 41.6 kg (91 lb 11.2 oz)   SpO2 94%   BMI 18.52 kg/m        "

## 2019-06-24 ENCOUNTER — TELEPHONE (OUTPATIENT)
Dept: INTERNAL MEDICINE | Facility: CLINIC | Age: 84
End: 2019-06-24

## 2019-06-24 ENCOUNTER — PATIENT OUTREACH (OUTPATIENT)
Dept: CARE COORDINATION | Facility: CLINIC | Age: 84
End: 2019-06-24

## 2019-06-24 NOTE — PROGRESS NOTES
Clinic Care Coordination Contact  RN CC follow up outreach.    Patient is enrolled in care coordination.  Follow up outreach to check on patient status and progression of goals.    Patient admitted to [facility] 6/18-6/23 for treatment of COPD and pneumonia.  Patient discharged home with home care services.    [facility]- JEROMY Cabello CM  [phone]     Patient is enrolled in care coordination.  RN CC spoke with patient over phone.    Chief Complaint   Patient presents with     Clinic Care Coordination - Post Hospital        Clinical Concerns:  Current Medical Concerns:    COPD/PNA-  Patient states she is happy to be home.  She slept well and feeling better.  We reviewed discharge instructions and reviewed new meds. She has not been wearing O2 continuously, only as needed.  She is monitoring O2 sats closely.  Patient explains the hospitalist told her she did not have to be tied down to her O2.  Patient has difficulty maneuvering her portable o2.  [facility] if there is better option for her and she never received response from them.  RN CC [facility] on behalf of patient and left message requesting return call.    Discussed home care services.  Patient has not yet heard from them.  Patient provided [facility] contact number to call if she doesn't hear from them by the end of tomorrow.    Medication Management:  Reviewed.  No questions or concerns.       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  She plans to start painting again.  She has a spare bedroom that acts as her studio.  She likes to paint landscapes with birds.         Goals:   Goals        Diet    Increase water intake     Notes - Note created  5/17/2019  4:29 PM by Aydee Rodriguez, EMT    Goal Statement: Increase water intake due to low BP  Measure of Success: Increased BP  Supportive Steps to Achieve: access to water and Boost  Barriers:  Severe infection of lips  Strengths: Determination, F/U by Community Paramedic  Date to Achieve By: 5/20  Patient expressed understanding of goal: yes           General    baseline health     Notes - Note created  5/16/2019  1:29 PM by Babs Garcia RN    Goal Statement: I will get back my independence.  Measure of Success: I will not have any pain.  I will be at baseline activity level.  I will not have any ED/IP admissions related to chronic health conditions.    Supportive Steps to Achieve: Patient to take medications as prescribed.  Patient will follow up with providers as recommended.  Patient will follow home care recommendations.  Patient will be supported by RN CC and community paramedic.  Barriers: back pain, not managed well.  Strengths: Patient is able to advocate for self.  Willing to accept recommendations and feedback.  Date to Achieve By: 1 July 2019  Patient expressed understanding of goal: yes.                Plan:   Patient to follow up with PCP on 6/25.  Patient to continue taking medications as prescribed and monitor O2 sats closely.    RN CC will await return call from [facility].  RN CC continue to follow while receiving home care services.    Babs Garcia RN  Care Coordination  Phone:  [phone]  Email: [email or web address]

## 2019-06-24 NOTE — TELEPHONE ENCOUNTER
IP F/U    Date: 06/23/19  Diagnosis: Copd Exacerbation, Pneumonia of Left Lung Due To Infectious Organism, Unspecified Part of Lung  Is patient active in care coordination? Yes - Route to Care Coordination (P 44320)  Was patient in TCU? No

## 2019-06-25 ENCOUNTER — TELEPHONE (OUTPATIENT)
Dept: INTERNAL MEDICINE | Facility: CLINIC | Age: 84
End: 2019-06-25

## 2019-06-25 ENCOUNTER — OFFICE VISIT (OUTPATIENT)
Dept: INTERNAL MEDICINE | Facility: CLINIC | Age: 84
End: 2019-06-25
Payer: MEDICARE

## 2019-06-25 VITALS
DIASTOLIC BLOOD PRESSURE: 60 MMHG | WEIGHT: 102 LBS | HEART RATE: 72 BPM | TEMPERATURE: 98.4 F | SYSTOLIC BLOOD PRESSURE: 120 MMHG | HEIGHT: 59 IN | BODY MASS INDEX: 20.56 KG/M2 | OXYGEN SATURATION: 92 % | RESPIRATION RATE: 16 BRPM

## 2019-06-25 DIAGNOSIS — Z87.01 H/O: PNEUMONIA: Primary | ICD-10-CM

## 2019-06-25 DIAGNOSIS — I24.9 ACS (ACUTE CORONARY SYNDROME) (H): ICD-10-CM

## 2019-06-25 DIAGNOSIS — E03.8 OTHER SPECIFIED HYPOTHYROIDISM: ICD-10-CM

## 2019-06-25 DIAGNOSIS — F41.9 ANXIETY: ICD-10-CM

## 2019-06-25 DIAGNOSIS — E78.5 HYPERLIPIDEMIA WITH TARGET LDL LESS THAN 130: ICD-10-CM

## 2019-06-25 DIAGNOSIS — C91.10 CLL (CHRONIC LYMPHOCYTIC LEUKEMIA) (H): ICD-10-CM

## 2019-06-25 PROCEDURE — 99214 OFFICE O/P EST MOD 30 MIN: CPT | Performed by: INTERNAL MEDICINE

## 2019-06-25 RX ORDER — METOPROLOL SUCCINATE 25 MG/1
25 TABLET, EXTENDED RELEASE ORAL DAILY
Qty: 90 TABLET | Refills: 4 | Status: SHIPPED | OUTPATIENT
Start: 2019-06-25 | End: 2020-07-09

## 2019-06-25 RX ORDER — LEVOTHYROXINE SODIUM 75 UG/1
75 TABLET ORAL DAILY
Qty: 90 TABLET | Refills: 4 | Status: SHIPPED | OUTPATIENT
Start: 2019-06-25 | End: 2020-07-24

## 2019-06-25 RX ORDER — ATORVASTATIN CALCIUM 20 MG/1
20 TABLET, FILM COATED ORAL DAILY
Qty: 90 TABLET | Refills: 4 | Status: SHIPPED | OUTPATIENT
Start: 2019-06-25 | End: 2020-08-07

## 2019-06-25 ASSESSMENT — MIFFLIN-ST. JEOR: SCORE: 803.3

## 2019-06-25 NOTE — NURSING NOTE
"/60   Pulse 72   Temp 98.4  F (36.9  C) (Oral)   Resp 16   Ht 1.499 m (4' 11\")   Wt 46.3 kg (102 lb)   SpO2 92%   Breastfeeding? No   BMI 20.60 kg/m      "

## 2019-06-25 NOTE — TELEPHONE ENCOUNTER
Dr. Kiser  [facility]  requesting  orders for Marie Kline through [employer].   Please REPLY TO THIS MESSAGE in order to give authorization for orders when needed.  This is considered a verbal order, you will still receive a faxed copy of orders for signature.  Thank you for your timely assistance.    Order for Marie Kline; MRN [id number]  Patient requested first homecare visit on Friday 6\28 due to appmnt with PCP 6\25, and oncology infusion all day 6\26, and family 6\27.  SN 1 visit for admission assessment on 6\28.  Sincerely [facility]  Alison Clay RN,BSN  [phone]

## 2019-06-25 NOTE — PROGRESS NOTES
"Subjective     Marie Kline is a 87 year old female who presents to clinic today for the following health issues:    HPI   Hospital Follow-up Visit:    Hospital/Nursing Home/IP Rehab Facility: [facility]  Date of Admission: 6/18/19  Date of Discharge: 6/23/19  Reason(s) for Admission: dyspnea, weakness            Problems taking medications regularly:  None       Medication changes since discharge: added augmentin, culturell, and prednisone       Problems adhering to non-medication therapy:  None    Summary of hospitalization:  [facility] discharge summary reviewed  Diagnostic Tests/Treatments reviewed.  Follow up needed: none  Other Healthcare Providers Involved in Patient s Care:         None  Update since discharge: improved.     Post Discharge Medication Reconciliation: discharge medications reconciled, continue medications without change.  Plan of care communicated with patient     Coding guidelines for this visit:  Type of Medical   Decision Making Face-to-Face Visit       within 7 Days of discharge Face-to-Face Visit        within 14 days of discharge   Moderate Complexity [id number]   High Complexity [id number]          Patient was in the hospital from 6/18/2019 to 6/23/2019 for \"weakness and increased dyspnea.\" When the patient was in the hospital, \"she was found to have acute hypoxic respiratory failure due to an  exacerbation of chronic obstructive lung disease and a left lingular infiltrate consistent with pneumonia that was not there on the cxr of 5/7/2019.\" She denies SOB, cough, or lower leg swelling.  Patient reports that she passes stool about three or four times a day. She mentions that when she was in the hospital she was not tested for C-diff. Denies any abdominal pain, fever, or chills. Her stool has not worsened and occurs after eating.  A chest x-ray was taken on 6/18/2019.  IMPRESSION: Possible developing right lower lobe pneumonia. Wedge of  opacity in the lingula of " the left upper lobe may represent an  additional site of pneumonia or atelectasis.     She notes that she takes her oxygen and pulse daily.   Patient taking prednisone 20 mg and was told to take two pills until Wednesday 6/26/2019. Then switch to half a pill for five days.     Bottom lip numbness   She reports that her bottom lip is numb due to having multiple sores. Mentions that she is in pain due to her top dentures not fitting      Anxiety   Patient is taking Zoloft 25 mg. She notes that her stress is increased due to her medical illnesses.     CLL (Chronic lymphocytic leukemia)  Patient is planning to see her doctor on 6/26/2019.     Recent Labs   Lab Test 06/21/19  0637 06/19/19  0701  06/18/19  0238  05/01/19  1144  12/09/18  0652  03/12/18  1009 02/19/18 09/12/17 06/17/16  0400 06/16/16  0620  10/09/14  0350   A1C  --   --   --   --   --   --   --   --   --   --   --   --   --   --  5.6 5.6  --  5.6   LDL  --   --   --   --   --  84  --   --   --  84  --   --  82  --   --   --   --  88   HDL  --   --   --   --   --  45*  --   --   --  47*  --   --  44  --   --   --   --  55   TRIG  --   --   --   --   --  160*  --   --   --  98  --   --  95  --   --   --   --  121   ALT  --   --   --   --   --  14  --   --   --   --  15  --  21   < >  --   --   --   --    CR 0.70 0.77   < > 0.77   < >  --    < > 1.10*   < > 1.16* 1.13   < > 1.21   < > 0.85 1.04   < > 0.96   GFRESTIMATED 78 69   < > 69   < >  --    < > 47*   < > 44* 44.0*   < > 40.7*   < > 64 50*   < > 55*   GFRESTBLACK >90 80   < > 80   < >  --    < > 57*   < > 54*  --    < >  --    < > 77 61   < > 67   POTASSIUM  --  3.9  --  4.6   < >  --    < > 4.2   < > 4.5 4.2   < > 4.2   < > 4.1 4.3   < > 5.1   TSH  --   --   --   --   --  0.46  --  7.36*  --  7.50*  --   --  2.54   < >  --   --    < > 5.80*    < > = values in this interval not displayed.        Reviewed and updated as needed this visit by Provider  Meds         Review of Systems   ROS COMP:  "CONSTITUTIONAL: NEGATIVE for fever, chills, change in weight  RESP: NEGATIVE for significant cough or SOB  CV: NEGATIVE for chest pain, palpitations or peripheral edema  PSYCHIATRIC: positive for generalized anxiety disorder.    This document serves as a record of the services and decisions personally performed and made by Piper Kiser MD. It was created on his behalf by Hailey Prajapati, a trained medical scribe. The creation of this document is based on the provider's statements to the medical scribe.  Hailey Prajapati June 25, 2019 2:05 PM       Objective    /60   Pulse 72   Temp 98.4  F (36.9  C) (Oral)   Resp 16   Ht 1.499 m (4' 11\")   Wt 46.3 kg (102 lb)   SpO2 92%   Breastfeeding? No   BMI 20.60 kg/m    Body mass index is 20.6 kg/m .  Physical Exam   GENERAL: healthy, alert and no distress  RESP: lungs clear to auscultation - no rales, rhonchi or wheezes  CV: regular rate and rhythm, normal S1 S2, no S3 or S4, no murmur, click or rub, no peripheral edema and peripheral pulses strong  PSYCH: mentation appears normal, affect normal/bright    Diagnostic Test Results:  Labs reviewed in Epic  No results found for this or any previous visit (from the past 24 hour(s)).        Assessment & Plan       (Z87.01) H/O: pneumonia  Comment: assess for resolution  Plan: XR Chest 2 Views        -complete antibiotics and prednisone taper    (F41.9) Anxiety  (primary encounter diagnosis)  Comment: recently increased  Plan: sertraline (ZOLOFT) 50 MG tablet        Increased the dosage from 25 mg to 50 mg.     (E03.8) Other specified hypothyroidism  Comment:   Plan: levothyroxine (SYNTHROID/LEVOTHROID) 75 MCG         tablet            (C91.90) CLL (chronic lymphocytic leukemia) (HCC)  Comment:   Plan: Patient plans to follow up with specialist.     (I24.9) ACS (acute coronary syndrome) (H)  Comment:   Plan: metoprolol succinate ER (TOPROL-XL) 25 MG 24 hr        tablet, atorvastatin (LIPITOR) 20 MG tablet            (E78.5) " Hyperlipidemia with target LDL less than 130  Comment: needs refill  Plan: atorvastatin (LIPITOR) 20 MG tablet       Continue with current mediation.     Diarrhea. Likely antibiotic- associated. Monitor for now. If worsens, patient to call.    FUTURE APPOINTMENTS:  Return in about 6 months (around 12/25/2019) for depression.    The information in this document, created by the medical scribe for me, accurately reflects the services I personally performed and the decisions made by me. I have reviewed and approved this document for accuracy.   June 25, 2019 2:21 PM     Piper Kiser MD  [facility]

## 2019-06-25 NOTE — PROGRESS NOTES
Clinic Care Coordination Contact  Care Team Conversations    Received return call from [facility].  They have been working on Marie's request.  They are currently waiting for paperwork and doctors orders from [facility].    Patient updated.    Babs PINZON CC

## 2019-06-27 ENCOUNTER — TELEPHONE (OUTPATIENT)
Dept: CARE COORDINATION | Facility: CLINIC | Age: 84
End: 2019-06-27

## 2019-06-27 NOTE — TELEPHONE ENCOUNTER
Received VM from patient requesting assistance with scheduling 6 week follow up chest xray.    RN CC called radiology scheduling and made appointment for patient on 8/5/19 at 0945.    Returned call to patient to update and gave appointment date/time.  Patient very thankful for the help.    Babs Garcia RN  Care Coordination  Phone:  [phone]  Email: [email or web address]

## 2019-06-28 ENCOUNTER — TELEPHONE (OUTPATIENT)
Dept: INTERNAL MEDICINE | Facility: CLINIC | Age: 84
End: 2019-06-28

## 2019-06-28 ENCOUNTER — TELEPHONE (OUTPATIENT)
Dept: CARE COORDINATION | Facility: CLINIC | Age: 84
End: 2019-06-28

## 2019-06-28 DIAGNOSIS — I50.9 CHRONIC CONGESTIVE HEART FAILURE, UNSPECIFIED HEART FAILURE TYPE (H): Primary | ICD-10-CM

## 2019-06-28 RX ORDER — FUROSEMIDE 20 MG
20 TABLET ORAL DAILY
Qty: 30 TABLET | Refills: 0 | Status: SHIPPED | OUTPATIENT
Start: 2019-06-28 | End: 2019-11-13

## 2019-06-28 NOTE — TELEPHONE ENCOUNTER
Spoke to patient. Advised her Dr. Kiser is starting Lasix for edema and to check BMP next week. Patient does not drive and [facility] does not deliver medications until Tuesday. Patient has home care nurse coming at 3pm today, she will ask her if there are any options to get medication sooner. Patient will also ask home nurse if they can draw the labs for her, patient advised to call the clinic for lab only appointment if home care cannot draw this.      Dr. Kiser also sent additional telephone encounter advising patient monitor for shortness of breath and monitor daily weights. Patient denies any shortness of breath and monitoring oxygen levels at home. Patient does monitor her weight each day and today's weight was 104 lb.

## 2019-06-28 NOTE — TELEPHONE ENCOUNTER
Spoke to patient. Advised her Dr. Kiser is starting Lasix for edema and to check BMP next week. Patient does not drive and [facility] does not deliver medications until Tuesday. Patient has home care nurse coming at 3pm today, she will ask her if there are any options to get medication sooner. Patient will also ask home nurse if they can draw the labs for her, patient advised to call the clinic for lab only appointment if home care cannot draw this.     Patient denies any shortness of breath and monitoring oxygen levels at home.   Patient does monitor her weight each day and today's weight was 104 lb.

## 2019-06-28 NOTE — TELEPHONE ENCOUNTER
[facility] and she said yes they will be able to draw her labs next week. Xochitl states the patient will have her son in law get her medication.

## 2019-06-28 NOTE — TELEPHONE ENCOUNTER
Recommend lasix 20mg    Patient should have bmp checked next week to make sure kidney function and potassium are okay

## 2019-06-28 NOTE — TELEPHONE ENCOUNTER
Accidentally closed JOSIAS Garcia's note    Patient to start lasix and recheck bmp at lab only next week    Forgot to mention she needs to weigh self daily and also monitor for shortness of breath with her history of heart failure

## 2019-06-28 NOTE — TELEPHONE ENCOUNTER
Received call from patient.  She is concerned about the increased edema in legs/ankles bilaterally.  Patient explains that ankles are so swollen she is not able to bend them and its painful.  She states she has been keeping legs elevated when sitting.  She hasn't done any increase about of walking or been up on her feet for long periods of time.  She has been taking all her meds as prescribed.  Marie is concerned it may be one of her medications.  Writer briefly reviewed meds and did not see anything that stood out as possible culprit.    Any recommendations?  Please advise.  Thank you.    Babs Garcia RN  Care Coordination  Phone:  [phone]  Email: [email or web address]

## 2019-07-02 ENCOUNTER — TELEPHONE (OUTPATIENT)
Dept: INTERNAL MEDICINE | Facility: CLINIC | Age: 84
End: 2019-07-02

## 2019-07-02 LAB
ANION GAP SERPL CALCULATED.3IONS-SCNC: 4 MMOL/L (ref 3–14)
BUN SERPL-MCNC: 9 MG/DL (ref 7–30)
CALCIUM SERPL-MCNC: 8.4 MG/DL (ref 8.5–10.1)
CHLORIDE SERPL-SCNC: 101 MMOL/L (ref 94–109)
CO2 SERPL-SCNC: 36 MMOL/L (ref 20–32)
CREAT SERPL-MCNC: 0.88 MG/DL (ref 0.52–1.04)
GFR SERPL CREATININE-BSD FRML MDRD: 59 ML/MIN/{1.73_M2}
GLUCOSE SERPL-MCNC: 96 MG/DL (ref 70–99)
POTASSIUM SERPL-SCNC: 3.5 MMOL/L (ref 3.4–5.3)
SODIUM SERPL-SCNC: 141 MMOL/L (ref 133–144)

## 2019-07-02 PROCEDURE — 80048 BASIC METABOLIC PNL TOTAL CA: CPT | Performed by: INTERNAL MEDICINE

## 2019-07-03 ENCOUNTER — TELEPHONE (OUTPATIENT)
Dept: INTERNAL MEDICINE | Facility: CLINIC | Age: 84
End: 2019-07-03

## 2019-07-08 ENCOUNTER — TELEPHONE (OUTPATIENT)
Dept: INTERNAL MEDICINE | Facility: CLINIC | Age: 84
End: 2019-07-08

## 2019-07-08 DIAGNOSIS — F51.01 PRIMARY INSOMNIA: Primary | ICD-10-CM

## 2019-07-08 DIAGNOSIS — F41.9 ANXIETY: ICD-10-CM

## 2019-07-08 NOTE — TELEPHONE ENCOUNTER
Patient taking asprin at night. Home health nurse suggested she take this in the morning. Please advise    Patient also have red patches below the knee on both legs. Patient concerned and not sure why this is happening    Ok to call and Lm [phone]

## 2019-07-08 NOTE — TELEPHONE ENCOUNTER
"Contacted patient.     Aspirin: Informed her it is okay to take aspirin in the morning if she prefers to do this. Patient asks if she even needs to take aspirin anymore, she doesn't remember why it was started. Informed patient that according to our medication list, Aspirin 325 mg was started following hip fracture, usually taken to prevent blood clots. Recommended she speak to Orthopedics to find out how long she needs to take this. Patient has a follow-up appointment with Dr. Reynolds on Thursday and will ask him about it.    Rash: patient states she has had red blotches on her left and right leg just below her knees. She does not remember when they first showed up, but states they've \"been there forever.\" The blotches are more numerous on her left leg, size and shape vary, but some are larger than a silver dollar - patient states one measures approximately 2\" x 0.5\". The blotches do not hurt or itch and have not spread. Patient states that the doctors and nurses in the hospital have also noticed them during her recent hospital stays, but didn't do any further evaluation for their cause. Patient states her home nurse questioned if they are from a medication reaction. Routed to provider to review for recommendations - should patient schedule follow-up appointment in our office, see Derm, or other recommendations.   "

## 2019-07-08 NOTE — TELEPHONE ENCOUNTER
"Requested Prescriptions   Pending Prescriptions Disp Refills     sertraline (ZOLOFT) 50 MG tablet Last Written Prescription Date:  6/25/2019  Last Fill Quantity: 90 tablets,  # refills: 1   Last office visit: 6/25/2019 with prescribing provider:  6/25/2019   Future Office Visit:   90 tablet 1     Sig: Take 1 tablet (50 mg) by mouth daily       SSRIs Protocol Passed - 7/8/2019  1:18 PM        Passed - Recent (12 mo) or future (30 days) visit within the authorizing provider's specialty     Patient had office visit in the last 12 months or has a visit in the next 30 days with authorizing provider or within the authorizing provider's specialty.  See \"Patient Info\" tab in inbasket, or \"Choose Columns\" in Meds & Orders section of the refill encounter.              Passed - Medication is active on med list        Passed - Patient is age 18 or older        Passed - No active pregnancy on record        Passed - No positive pregnancy test in last 12 months        traZODone (DESYREL) 50 MG tablet Last Written Prescription Date:  Not noted  Last Fill Quantity: not noted,  # refills: 0   Last office visit: 6/25/2019 with prescribing provider:  6/25/2019  Future Office Visit:         Sig: Take 2 tablets (100 mg) by mouth At Bedtime       Serotonin Modulators Passed - 7/8/2019  1:18 PM        Passed - Recent (12 mo) or future (30 days) visit within the authorizing provider's specialty     Patient had office visit in the last 12 months or has a visit in the next 30 days with authorizing provider or within the authorizing provider's specialty.  See \"Patient Info\" tab in inbasket, or \"Choose Columns\" in Meds & Orders section of the refill encounter.              Passed - Medication is active on med list        Passed - Patient is age 18 or older        Passed - No active pregnancy on record        Passed - No positive pregnancy test in past 12 months        "

## 2019-07-09 RX ORDER — TRAZODONE HYDROCHLORIDE 50 MG/1
100 TABLET, FILM COATED ORAL AT BEDTIME
Qty: 90 TABLET | Refills: 0 | Status: SHIPPED | OUTPATIENT
Start: 2019-07-09 | End: 2019-10-21

## 2019-07-09 NOTE — TELEPHONE ENCOUNTER
Zoloft  Duplicate. See refill from 6/25/19    Trazodone  Routing refill request to provider for review/approval because:  Has not been prescribed by primary care provider

## 2019-07-10 ENCOUNTER — PATIENT OUTREACH (OUTPATIENT)
Dept: CARE COORDINATION | Facility: CLINIC | Age: 84
End: 2019-07-10

## 2019-07-10 NOTE — PROGRESS NOTES
"Clinic Care Coordination Contact    Received VM from patient requesting return call.    Patient is enrolled in care coordinatioin.  Returned patient call.  Patient is requesting status of prescritipon refill for Zolft and Trazadone.  Chart reviewed, refill was sent yesterday afternoon and should be available for  today.    Patient also explained she has a lot of bruising on her legs and is wondering if the aspirin she is taking is causing this.  Patient had spoke with Triage RN yesterday about this as well.  RN CC reiterated what triage had suggested, patient should discuss ongoing need of aspirin with orthopedic surgeon who prescribed it.  RN CC explained Asprin is a blood thinner and can be related to bruising.  Patient has f/u with ortho tomorrow, 7/11/19 and will ask about this.    Patient complained about prednisone, stating it makes her jittery.  She is wondering how long she has to take this.  The prednisone was prescribed after hospitalization for COPD flare.  It was explained prednisone is not ment to be taking long term.  She has no refills for this med.  RN CC suggested she take as prescribed until it is gone.  Patient verbalized understanding.    Patient states she is doing \"great\".  No complaints of shortness of breath.  She keeps pulse ox close by and checks saturations frequently and reports \" they are never below 91%\".  BP this morning was 165/67, higher than normal, patient explains its likely because \"I had to run to answer phone\".  Patient will monitor.    Socially, patient is very involved in the community she is living in.  She plays bingo weekly and helps facilitate.  Patient endorses using walker when out of apartment.    RN CC anticipates patient is nearing graduation status of care coordination.  She has been moved to maintenance status as of today.  Rn CC will outreach again in 2 months.    Patient is encouraged to call with any other questions or concerns.    Babs Garcia RN  Care " Coordination  Phone:  [phone]  Email: [email or web address]

## 2019-07-11 ENCOUNTER — TRANSFERRED RECORDS (OUTPATIENT)
Dept: HEALTH INFORMATION MANAGEMENT | Facility: CLINIC | Age: 84
End: 2019-07-11

## 2019-07-12 NOTE — TELEPHONE ENCOUNTER
Recommend decreasing the aspirin to 81mg if okay by ortho    The aspirin could be causing red spots.

## 2019-07-12 NOTE — TELEPHONE ENCOUNTER
Spoke to pt and let her know that PCP said she can lower ASA to 81 mg daily. And that ASA could be causing red spots. Pt noted that she woke up with a new red spot this morning. She will keep an eye on this.  NADER-Dr Reynolds dx pt with bursitis as well-new dx.

## 2019-07-15 ENCOUNTER — APPOINTMENT (OUTPATIENT)
Dept: GENERAL RADIOLOGY | Facility: CLINIC | Age: 84
DRG: 562 | End: 2019-07-15
Attending: EMERGENCY MEDICINE
Payer: MEDICARE

## 2019-07-15 ENCOUNTER — APPOINTMENT (OUTPATIENT)
Dept: CT IMAGING | Facility: CLINIC | Age: 84
DRG: 562 | End: 2019-07-15
Attending: EMERGENCY MEDICINE
Payer: MEDICARE

## 2019-07-15 ENCOUNTER — HOSPITAL ENCOUNTER (INPATIENT)
Facility: CLINIC | Age: 84
LOS: 9 days | Discharge: NURSING, REHAB OR LONG-TERM CARE | DRG: 562 | End: 2019-07-24
Attending: EMERGENCY MEDICINE | Admitting: INTERNAL MEDICINE
Payer: MEDICARE

## 2019-07-15 DIAGNOSIS — F41.9 ANXIETY: Primary | ICD-10-CM

## 2019-07-15 DIAGNOSIS — S42.292A OTHER CLOSED DISPLACED FRACTURE OF PROXIMAL END OF LEFT HUMERUS, INITIAL ENCOUNTER: ICD-10-CM

## 2019-07-15 DIAGNOSIS — S00.03XA HEMATOMA OF SCALP, INITIAL ENCOUNTER: ICD-10-CM

## 2019-07-15 DIAGNOSIS — T14.8XXA MULTIPLE SKIN TEARS: ICD-10-CM

## 2019-07-15 DIAGNOSIS — J18.9 PNEUMONIA OF LOWER LOBE DUE TO INFECTIOUS ORGANISM, UNSPECIFIED LATERALITY: ICD-10-CM

## 2019-07-15 DIAGNOSIS — J96.22 ACUTE AND CHRONIC RESPIRATORY FAILURE WITH HYPERCAPNIA (H): ICD-10-CM
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
## 2019-07-15 DIAGNOSIS — S42.90XA CLOSED FRACTURE OF SHOULDER, UNSPECIFIED LATERALITY, INITIAL ENCOUNTER: ICD-10-CM

## 2019-07-15 DIAGNOSIS — J44.1 COPD EXACERBATION (H): ICD-10-CM

## 2019-07-15 DIAGNOSIS — K59.03 DRUG-INDUCED CONSTIPATION: ICD-10-CM

## 2019-07-15 LAB
ANION GAP SERPL CALCULATED.3IONS-SCNC: 6 MMOL/L (ref 3–14)
ANISOCYTOSIS BLD QL SMEAR: SLIGHT
BASOPHILS # BLD AUTO: 0 10E9/L (ref 0–0.2)
BASOPHILS NFR BLD AUTO: 0 %
BUN SERPL-MCNC: 16 MG/DL (ref 7–30)
CALCIUM SERPL-MCNC: 8.8 MG/DL (ref 8.5–10.1)
CHLORIDE SERPL-SCNC: 99 MMOL/L (ref 94–109)
CO2 SERPL-SCNC: 30 MMOL/L (ref 20–32)
CREAT SERPL-MCNC: 1.04 MG/DL (ref 0.52–1.04)
DIFFERENTIAL METHOD BLD: ABNORMAL
EOSINOPHIL # BLD AUTO: 0 10E9/L (ref 0–0.7)
EOSINOPHIL NFR BLD AUTO: 0 %
ERYTHROCYTE [DISTWIDTH] IN BLOOD BY AUTOMATED COUNT: 17.8 % (ref 10–15)
GFR SERPL CREATININE-BSD FRML MDRD: 48 ML/MIN/{1.73_M2}
GLUCOSE SERPL-MCNC: 128 MG/DL (ref 70–99)
HCT VFR BLD AUTO: 36.7 % (ref 35–47)
HGB BLD-MCNC: 10.9 G/DL (ref 11.7–15.7)
LYMPHOCYTES # BLD AUTO: 114.9 10E9/L (ref 0.8–5.3)
LYMPHOCYTES NFR BLD AUTO: 84 %
MACROCYTES BLD QL SMEAR: PRESENT
MCH RBC QN AUTO: 28.9 PG (ref 26.5–33)
MCHC RBC AUTO-ENTMCNC: 29.7 G/DL (ref 31.5–36.5)
MCV RBC AUTO: 97 FL (ref 78–100)
MONOCYTES # BLD AUTO: 10.9 10E9/L (ref 0–1.3)
MONOCYTES NFR BLD AUTO: 8 %
NEUTROPHILS # BLD AUTO: 10.9 10E9/L (ref 1.6–8.3)
NEUTROPHILS NFR BLD AUTO: 8 %
PLATELET # BLD AUTO: 174 10E9/L (ref 150–450)
PLATELET # BLD EST: ABNORMAL 10*3/UL
POTASSIUM SERPL-SCNC: 4.2 MMOL/L (ref 3.4–5.3)
RBC # BLD AUTO: 3.77 10E12/L (ref 3.8–5.2)
SODIUM SERPL-SCNC: 135 MMOL/L (ref 133–144)
WBC # BLD AUTO: 136.8 10E9/L (ref 4–11)

## 2019-07-15 PROCEDURE — 73030 X-RAY EXAM OF SHOULDER: CPT | Mod: LT

## 2019-07-15 PROCEDURE — 96374 THER/PROPH/DIAG INJ IV PUSH: CPT

## 2019-07-15 PROCEDURE — 80048 BASIC METABOLIC PNL TOTAL CA: CPT | Performed by: EMERGENCY MEDICINE

## 2019-07-15 PROCEDURE — 25000128 H RX IP 250 OP 636: Performed by: EMERGENCY MEDICINE

## 2019-07-15 PROCEDURE — 73130 X-RAY EXAM OF HAND: CPT | Mod: LT

## 2019-07-15 PROCEDURE — 12000000 ZZH R&B MED SURG/OB

## 2019-07-15 PROCEDURE — 96376 TX/PRO/DX INJ SAME DRUG ADON: CPT

## 2019-07-15 PROCEDURE — 25000132 ZZH RX MED GY IP 250 OP 250 PS 637: Mod: GY | Performed by: EMERGENCY MEDICINE

## 2019-07-15 PROCEDURE — 93005 ELECTROCARDIOGRAM TRACING: CPT

## 2019-07-15 PROCEDURE — 73590 X-RAY EXAM OF LOWER LEG: CPT | Mod: LT

## 2019-07-15 PROCEDURE — 85025 COMPLETE CBC W/AUTO DIFF WBC: CPT | Performed by: EMERGENCY MEDICINE

## 2019-07-15 PROCEDURE — 99285 EMERGENCY DEPT VISIT HI MDM: CPT | Mod: 25

## 2019-07-15 PROCEDURE — 70450 CT HEAD/BRAIN W/O DYE: CPT

## 2019-07-15 PROCEDURE — 99223 1ST HOSP IP/OBS HIGH 75: CPT | Mod: AI | Performed by: INTERNAL MEDICINE

## 2019-07-15 RX ORDER — HYDROMORPHONE HCL/0.9% NACL/PF 0.2MG/0.2
0.2 SYRINGE (ML) INTRAVENOUS ONCE
Status: COMPLETED | OUTPATIENT
Start: 2019-07-15 | End: 2019-07-15

## 2019-07-15 RX ORDER — OXYCODONE HCL 5 MG/5 ML
3 SOLUTION, ORAL ORAL ONCE
Status: COMPLETED | OUTPATIENT
Start: 2019-07-15 | End: 2019-07-15

## 2019-07-15 RX ORDER — BUDESONIDE AND FORMOTEROL FUMARATE DIHYDRATE 80; 4.5 UG/1; UG/1
2 AEROSOL RESPIRATORY (INHALATION) 2 TIMES DAILY
COMMUNITY
End: 2019-10-15

## 2019-07-15 RX ORDER — ASPIRIN 81 MG/1
81 TABLET ORAL EVERY EVENING
Status: ON HOLD | COMMUNITY
End: 2022-01-01

## 2019-07-15 RX ORDER — HYDROMORPHONE HCL/0.9% NACL/PF 0.2MG/0.2
0.2 SYRINGE (ML) INTRAVENOUS
Status: DISCONTINUED | OUTPATIENT
Start: 2019-07-15 | End: 2019-07-16

## 2019-07-15 RX ORDER — ACETAMINOPHEN 500 MG
1000 TABLET ORAL ONCE
Status: COMPLETED | OUTPATIENT
Start: 2019-07-15 | End: 2019-07-15

## 2019-07-15 RX ADMIN — ACETAMINOPHEN 1000 MG: 500 TABLET, FILM COATED ORAL at 21:29

## 2019-07-15 RX ADMIN — Medication 0.2 MG: at 19:20

## 2019-07-15 RX ADMIN — OXYCODONE HYDROCHLORIDE 3 MG: 5 SOLUTION ORAL at 21:29

## 2019-07-15 RX ADMIN — Medication 0.2 MG: at 20:10

## 2019-07-15 ASSESSMENT — ENCOUNTER SYMPTOMS
ARTHRALGIAS: 1
NECK PAIN: 0
ABDOMINAL PAIN: 0
SHORTNESS OF BREATH: 0
WOUND: 1
COLOR CHANGE: 1
DIZZINESS: 1

## 2019-07-15 NOTE — ED NOTES
Bed: ED33  Expected date: 7/15/19  Expected time: 5:51 PM  Means of arrival: Ambulance  Comments:  BSV 2

## 2019-07-15 NOTE — ED TRIAGE NOTES
Pt A&O x4. ABCs intact. Pt felt dizziness before falling which never happened before. Fell from cement stairs. Left head hematoma. Left shoulder pain and impaired movement. Left elbow and lower leg skin tear. EMS noted A-fib.

## 2019-07-15 NOTE — ED PROVIDER NOTES
"  History     Chief Complaint:  Fall    The history is provided by the patient.      Marie Kline is a 87 year old female with a history of  CLL, COPD on 1-2L, myocardial infarction, and CHF who presents to the emergency department via EMS for evaluation of a fall. This morning, the patient states she felt good. To note, the patient lives in her own apartment at a senior living facility. Prior to arrival, she felt dizzy while going up the stairs. She had one leg up on a step, lost her balance, and fell backwards and hit her head. She did not lose consciousness but did sustain a hematoma to her occipital lobe. She fell on her left side, hurting her left shoulder and sustained a large skin tear on her left shin and another one on her left elbow. To note, losing her balance is not new for her, and she states she \"bounces off the furniture all the time.\" Her fall prompted her to call EMS who transferred her here for further evaluation.     Patient denies any neck pain, right shoulder pain, abdominal pain, or difficulty breathing. She denies any previous fractures or surgeries to her left shoulder. She denies using a cane or walker around her apartment. Patient takes a baby aspirin but no blood thinner.    Allergies:  Contrast dye: hives   Adhesive tape  Prolia: jaw osteonecrosis      Medications:    Albuterol   Aspirin 325mg  Atorvastatin  Oscal  Gabapentin  Levothyroxine   Metoprolol   Dulera inhaler  Multivitamin  Senokot   Zoloft  Trazodone      Past Medical History:    Arthritis  Bladder cancer  Cardiomyopathy  CLL  CHF  COPD  HTN  Hypothyroidism   Mumps  MI  Tricuspid valve disorders  Pulmonary edema     Past Surgical History:    Right inguinal lymph node biopsy  Bladder surgery  Left heart cath  Bladder biopsy   Transurethral resection  Left hip hemiarthroplasty/ORIF     Family History:    Cerebrovascular disease  Cancer     Social History:  Smoking status: Former smoker, Quit 1996   Negative for drug " use.  Negative for alcohol use.  Presents via EMS.  [facility]ides at [facility]   Marital Status:        Review of Systems   Respiratory: Negative for shortness of breath.    Gastrointestinal: Negative for abdominal pain.   Musculoskeletal: Positive for arthralgias (Left shoulder). Negative for neck pain.   Skin: Positive for color change and wound.   Neurological: Positive for dizziness.   All other systems reviewed and are negative.        Physical Exam     Patient Vitals for the past 24 hrs:   BP Temp Temp src Pulse Heart Rate Resp SpO2   07/15/19 2140 -- -- -- -- 62 17 98 %   07/15/19 2135 119/68 -- -- 63 63 22 97 %   07/15/19 2125 -- -- -- -- 62 20 98 %   07/15/19 2120 -- -- -- -- 62 15 96 %   07/15/19 2115 -- -- -- -- 62 20 95 %   07/15/19 2110 102/78 -- -- 63 61 20 100 %   07/1935 -- -- -- -- 60 26 95 %   07/15/19 1930 113/64 -- -- 60 63 15 95 %   07/15/19 1813 141/68 98.2  F (36.8  C) Oral -- 69 18 94 %     Physical Exam  A: protecting airway, speaking with out difficulty  B: No resp distress, BS CTAB  C: central and peripheral pulses intact, skin warm  D: GCS 15, No focal motor or sensory deficits      Gen:   Uncomfortable due to pain    HEENT:        L occipital hematoma       PERRL, measuring 3mm bilaterally       No septal Hematoma       Midface stable       No signs basilar skull fracture  Neck:              No midline TTP       Able to range laterally >45 deg as well as flex/ext without pain  Lungs:       CTAB       No crepitus or deformity chestwall  CV:        rrr, no m/r/g, ppi  Abd:        soft, nontender, nondistended  Ext:     RUE       No TTP, deformity, joint effusion       Strength intact throughout       SILT       Distal pulses and perfusion intact       Compartments soft     LUE       + deformity and moderate STS/ecchymosis L shoulder, ROM signif limited by pain, superior lateral deltoid superficial skin tear, no bony tenderness at the elbow or wrist joint but  there is a mild tenderness over the hyperthenar eminence lateral elbow superficial skin tear.  And a superficial abrasion on the lateral aspect of the fifth MCP joint.  No significant tenderness in the snuffbox       SILT       Distal pulses and perfusion intact       Compartments soft     RLE       No TTP, deformity, joint effusion       Strength intact throughout       SILT       Distal pulses and perfusion intact       Compartments soft           LLE       No TTP, deformity, joint effusion       Strength intact throughout       SILT       Distal pulses and perfusion intact       Compartments soft        Superficial skin tear lateral mid to distal lower leg    Back - no midline T or L spine ttp,             no step offs or palpable deformities    Pelvis - stable to lateral compression    Skin:        Multiple superficial skin tears left shoulder as noted above, left elbow, left lower extremity lateral distal lower leg  Neuro:       Alert, GCS 15       CN 2-12 grossly intact       No motor or sensory deficits       Coordination intact  Psych:       Normal mood, normal affect      Emergency Department Course   ECG:  Indication: Fall  Time: 1816  Vent. Rate 68 bpm. HI interval 210. QRS duration 98. QT/QTc 402/427. P-R-T axis 88 49 72. Sinus rhythm with 1st degree AV block with premature atrial complexes. Otherwise normal ECG. Read time: 1835    Imaging:  XR Shoulder, Left, G/E 3 views:   Acute comminuted fracture of the proximal left humerus. There is a fracture line across the surgical neck with lateral displacement of the distal fracture fragment. A nondisplaced fracture line extends into the greater tuberosity. Normal joint alignment maintained. Surrounding soft tissue swelling. Aortic atherosclerosis. As per radiology.     XR Hand, Left, G/E 3 views:   Age-indeterminate minimally displaced fractures of the scaphoid waist and dorsal triquetrum. Normal joint alignment maintained. Degenerative changes throughout the  hand and wrist. Chondrocalcinosis of the triangle fibrocartilage. Osteopenia. As per radiology.     XR Tibula & Fibula, Left, G/E 2 views:   No acute fracture identified. Mild degenerative changes of the knee and ankle joints. Osteopenia. As per radiology.     CT Head without contrast:   No intracranial hemorrhage or skull fractures are identified. As per radiology.    Laboratory:  CBC: WBC: 136.8 (HH), HGB: 10.9 (L), PLT: 174  BMP: Glucose 128 (H), GFR Estimate: 48 (L), o/w WNL (Creatinine: 1.04)    Interventions:   Dilaudid 0.2 mg IV   Dilaudid 0.2 mg IV   Oxycodone 3 mg PO   Tylenol 1,000 mg PO    Emergency Department Course:  Nursing notes and vitals reviewed.  I performed an exam of the patient as documented above.     IV inserted. Medicine administered as documented above. Blood drawn. This was sent to the lab for further testing, results above.    The patient was sent for a left shoulder, left hand, and left tibia/fibula x-ray and head CT while in the emergency department, findings above.     EKG obtained in the ED, see results above.      I rechecked the patient and discussed the results of her workup thus far.       I consulted with Dr. Slaughter of the hospitalist services. They are in agreement to accept the patient for admission.     I consulted with Dr. Ramey, orthopedics, regarding the patient's history and presentation here in the emergency department.    Findings and plan explained to the Patient and daughter who consents to admission. Discussed the patient with Dr. Slaughter, who will admit the patient to a med/surg bed for further monitoring, evaluation, and treatment.    Impression & Plan    Medical Decision Makin-year-old female presenting after mechanical fall unfortunately suffered a left scalp contusion/hematoma but no skull fracture or intracranial abnormality as well as a comminuted proximal left humerus fracture and multiple skin tears.  Left hand x-ray  abnormalities are noted she has no significant tenderness in the snuffbox or over the triquetrum and these appear to be likely old injuries.  Was put in a sling for comfort here given her current living situation need for pain control mobilization would benefit from inpatient hospitalization.  White count elevated with a history of CLL and is consistent with that.  Critical Care time:  none    Diagnosis:    ICD-10-CM    1. Fall, initial encounter W19.XXXA    2. Other closed displaced fracture of proximal end of left humerus, initial encounter S42.292A    3. Multiple skin tears T14.8XXA    4. Hematoma of scalp, initial encounter S00.03XA        Disposition:  Admitted to Dr. Kasandra David Disclosure:  I, Tahmina Dao, am serving as a scribe on 7/15/2019 at 9:34 PM to personally document services performed by Reagan Bonilla, * based on my observations and the provider's statements to me.     Tahmina Dao  7/15/2019   [facility] EMERGENCY DEPARTMENT       Reagan Bonilla MD  07/15/19 1086

## 2019-07-16 ENCOUNTER — APPOINTMENT (OUTPATIENT)
Dept: PHYSICAL THERAPY | Facility: CLINIC | Age: 84
DRG: 562 | End: 2019-07-16
Attending: INTERNAL MEDICINE
Payer: MEDICARE

## 2019-07-16 ENCOUNTER — APPOINTMENT (OUTPATIENT)
Dept: GENERAL RADIOLOGY | Facility: CLINIC | Age: 84
DRG: 562 | End: 2019-07-16
Attending: INTERNAL MEDICINE
Payer: MEDICARE

## 2019-07-16 ENCOUNTER — PATIENT OUTREACH (OUTPATIENT)
Dept: CARE COORDINATION | Facility: CLINIC | Age: 84
End: 2019-07-16

## 2019-07-16 PROBLEM — S42.90XA SHOULDER FRACTURE: Status: ACTIVE | Noted: 2019-07-16

## 2019-07-16 LAB
ANION GAP SERPL CALCULATED.3IONS-SCNC: 4 MMOL/L (ref 3–14)
BUN SERPL-MCNC: 19 MG/DL (ref 7–30)
CALCIUM SERPL-MCNC: 8.5 MG/DL (ref 8.5–10.1)
CHLORIDE SERPL-SCNC: 98 MMOL/L (ref 94–109)
CO2 SERPL-SCNC: 29 MMOL/L (ref 20–32)
CREAT SERPL-MCNC: 0.87 MG/DL (ref 0.52–1.04)
ERYTHROCYTE [DISTWIDTH] IN BLOOD BY AUTOMATED COUNT: 18.1 % (ref 10–15)
GFR SERPL CREATININE-BSD FRML MDRD: 60 ML/MIN/{1.73_M2}
GLUCOSE SERPL-MCNC: 141 MG/DL (ref 70–99)
HCT VFR BLD AUTO: 29.4 % (ref 35–47)
HGB BLD-MCNC: 8.8 G/DL (ref 11.7–15.7)
INTERPRETATION ECG - MUSE: NORMAL
MCH RBC QN AUTO: 28.7 PG (ref 26.5–33)
MCHC RBC AUTO-ENTMCNC: 29.9 G/DL (ref 31.5–36.5)
MCV RBC AUTO: 96 FL (ref 78–100)
PLATELET # BLD AUTO: 142 10E9/L (ref 150–450)
POTASSIUM SERPL-SCNC: 5.8 MMOL/L (ref 3.4–5.3)
RBC # BLD AUTO: 3.07 10E12/L (ref 3.8–5.2)
SODIUM SERPL-SCNC: 131 MMOL/L (ref 133–144)
WBC # BLD AUTO: 142.3 10E9/L (ref 4–11)

## 2019-07-16 PROCEDURE — 25000128 H RX IP 250 OP 636: Performed by: INTERNAL MEDICINE

## 2019-07-16 PROCEDURE — 25000132 ZZH RX MED GY IP 250 OP 250 PS 637: Mod: GY | Performed by: INTERNAL MEDICINE

## 2019-07-16 PROCEDURE — 36415 COLL VENOUS BLD VENIPUNCTURE: CPT | Performed by: INTERNAL MEDICINE

## 2019-07-16 PROCEDURE — 97162 PT EVAL MOD COMPLEX 30 MIN: CPT | Mod: GP | Performed by: PHYSICAL THERAPIST

## 2019-07-16 PROCEDURE — 97110 THERAPEUTIC EXERCISES: CPT | Mod: GP | Performed by: PHYSICAL THERAPIST

## 2019-07-16 PROCEDURE — 85027 COMPLETE CBC AUTOMATED: CPT | Performed by: INTERNAL MEDICINE

## 2019-07-16 PROCEDURE — 80048 BASIC METABOLIC PNL TOTAL CA: CPT | Performed by: INTERNAL MEDICINE

## 2019-07-16 PROCEDURE — 40000901 ZZH STATISTIC WOC PT EDUCATION, 0-15 MIN

## 2019-07-16 PROCEDURE — 73523 X-RAY EXAM HIPS BI 5/> VIEWS: CPT

## 2019-07-16 PROCEDURE — 82947 ASSAY GLUCOSE BLOOD QUANT: CPT | Performed by: INTERNAL MEDICINE

## 2019-07-16 PROCEDURE — 99232 SBSQ HOSP IP/OBS MODERATE 35: CPT | Performed by: INTERNAL MEDICINE

## 2019-07-16 PROCEDURE — 12000000 ZZH R&B MED SURG/OB

## 2019-07-16 PROCEDURE — G0463 HOSPITAL OUTPT CLINIC VISIT: HCPCS

## 2019-07-16 PROCEDURE — 97530 THERAPEUTIC ACTIVITIES: CPT | Mod: GP | Performed by: PHYSICAL THERAPIST

## 2019-07-16 RX ORDER — GABAPENTIN 300 MG/1
300 CAPSULE ORAL 2 TIMES DAILY
Status: DISCONTINUED | OUTPATIENT
Start: 2019-07-16 | End: 2019-07-24 | Stop reason: HOSPADM

## 2019-07-16 RX ORDER — TRAZODONE HYDROCHLORIDE 50 MG/1
100 TABLET, FILM COATED ORAL AT BEDTIME
Status: DISCONTINUED | OUTPATIENT
Start: 2019-07-16 | End: 2019-07-24 | Stop reason: HOSPADM

## 2019-07-16 RX ORDER — AMOXICILLIN 250 MG
2 CAPSULE ORAL 2 TIMES DAILY PRN
Status: DISCONTINUED | OUTPATIENT
Start: 2019-07-16 | End: 2019-07-17

## 2019-07-16 RX ORDER — ONDANSETRON 4 MG/1
4 TABLET, ORALLY DISINTEGRATING ORAL EVERY 6 HOURS PRN
Status: DISCONTINUED | OUTPATIENT
Start: 2019-07-16 | End: 2019-07-24 | Stop reason: HOSPADM

## 2019-07-16 RX ORDER — NALOXONE HYDROCHLORIDE 0.4 MG/ML
.1-.4 INJECTION, SOLUTION INTRAMUSCULAR; INTRAVENOUS; SUBCUTANEOUS
Status: DISCONTINUED | OUTPATIENT
Start: 2019-07-16 | End: 2019-07-24 | Stop reason: HOSPADM

## 2019-07-16 RX ORDER — AMOXICILLIN 250 MG
1 CAPSULE ORAL 2 TIMES DAILY PRN
Status: DISCONTINUED | OUTPATIENT
Start: 2019-07-16 | End: 2019-07-17

## 2019-07-16 RX ORDER — TRAMADOL HYDROCHLORIDE 50 MG/1
50 TABLET ORAL EVERY 6 HOURS PRN
Status: DISCONTINUED | OUTPATIENT
Start: 2019-07-16 | End: 2019-07-17

## 2019-07-16 RX ORDER — PROCHLORPERAZINE 25 MG
12.5 SUPPOSITORY, RECTAL RECTAL EVERY 12 HOURS PRN
Status: DISCONTINUED | OUTPATIENT
Start: 2019-07-16 | End: 2019-07-24 | Stop reason: HOSPADM

## 2019-07-16 RX ORDER — LEVOTHYROXINE SODIUM 75 UG/1
75 TABLET ORAL DAILY
Status: DISCONTINUED | OUTPATIENT
Start: 2019-07-16 | End: 2019-07-24 | Stop reason: HOSPADM

## 2019-07-16 RX ORDER — HYDROMORPHONE HYDROCHLORIDE 1 MG/ML
0.2 INJECTION, SOLUTION INTRAMUSCULAR; INTRAVENOUS; SUBCUTANEOUS
Status: DISCONTINUED | OUTPATIENT
Start: 2019-07-16 | End: 2019-07-24 | Stop reason: HOSPADM

## 2019-07-16 RX ORDER — ACETAMINOPHEN 325 MG/1
650 TABLET ORAL EVERY 4 HOURS PRN
Status: DISCONTINUED | OUTPATIENT
Start: 2019-07-16 | End: 2019-07-19

## 2019-07-16 RX ORDER — ALBUTEROL SULFATE 90 UG/1
2 AEROSOL, METERED RESPIRATORY (INHALATION) EVERY 6 HOURS PRN
Status: DISCONTINUED | OUTPATIENT
Start: 2019-07-16 | End: 2019-07-24 | Stop reason: HOSPADM

## 2019-07-16 RX ORDER — PROCHLORPERAZINE MALEATE 5 MG
5 TABLET ORAL EVERY 6 HOURS PRN
Status: DISCONTINUED | OUTPATIENT
Start: 2019-07-16 | End: 2019-07-24 | Stop reason: HOSPADM

## 2019-07-16 RX ORDER — POLYETHYLENE GLYCOL 3350 17 G/17G
17 POWDER, FOR SOLUTION ORAL DAILY PRN
Status: DISCONTINUED | OUTPATIENT
Start: 2019-07-16 | End: 2019-07-24 | Stop reason: HOSPADM

## 2019-07-16 RX ORDER — ONDANSETRON 2 MG/ML
4 INJECTION INTRAMUSCULAR; INTRAVENOUS EVERY 6 HOURS PRN
Status: DISCONTINUED | OUTPATIENT
Start: 2019-07-16 | End: 2019-07-24 | Stop reason: HOSPADM

## 2019-07-16 RX ORDER — ATORVASTATIN CALCIUM 20 MG/1
20 TABLET, FILM COATED ORAL DAILY
Status: DISCONTINUED | OUTPATIENT
Start: 2019-07-16 | End: 2019-07-24 | Stop reason: HOSPADM

## 2019-07-16 RX ORDER — ALBUTEROL SULFATE 0.83 MG/ML
2.5 SOLUTION RESPIRATORY (INHALATION) EVERY 6 HOURS PRN
Status: DISCONTINUED | OUTPATIENT
Start: 2019-07-16 | End: 2019-07-24 | Stop reason: HOSPADM

## 2019-07-16 RX ORDER — LIDOCAINE 40 MG/G
CREAM TOPICAL
Status: DISCONTINUED | OUTPATIENT
Start: 2019-07-16 | End: 2019-07-24 | Stop reason: HOSPADM

## 2019-07-16 RX ADMIN — TRAZODONE HYDROCHLORIDE 100 MG: 50 TABLET ORAL at 20:57

## 2019-07-16 RX ADMIN — GABAPENTIN 300 MG: 300 CAPSULE ORAL at 20:57

## 2019-07-16 RX ADMIN — ONDANSETRON HYDROCHLORIDE 4 MG: 2 INJECTION, SOLUTION INTRAMUSCULAR; INTRAVENOUS at 01:34

## 2019-07-16 RX ADMIN — SODIUM CHLORIDE 1000 ML: 9 INJECTION, SOLUTION INTRAVENOUS at 01:36

## 2019-07-16 RX ADMIN — TRAMADOL HYDROCHLORIDE 50 MG: 50 TABLET, COATED ORAL at 12:50

## 2019-07-16 RX ADMIN — ACETAMINOPHEN 650 MG: 325 TABLET, FILM COATED ORAL at 12:50

## 2019-07-16 RX ADMIN — HYDROMORPHONE HYDROCHLORIDE 0.2 MG: 1 INJECTION, SOLUTION INTRAMUSCULAR; INTRAVENOUS; SUBCUTANEOUS at 08:37

## 2019-07-16 RX ADMIN — TRAMADOL HYDROCHLORIDE 50 MG: 50 TABLET, COATED ORAL at 18:45

## 2019-07-16 RX ADMIN — LEVOTHYROXINE SODIUM 75 MCG: 75 TABLET ORAL at 08:40

## 2019-07-16 RX ADMIN — ATORVASTATIN CALCIUM 20 MG: 20 TABLET, FILM COATED ORAL at 08:40

## 2019-07-16 RX ADMIN — HYDROMORPHONE HYDROCHLORIDE 0.2 MG: 1 INJECTION, SOLUTION INTRAMUSCULAR; INTRAVENOUS; SUBCUTANEOUS at 05:58

## 2019-07-16 RX ADMIN — GABAPENTIN 300 MG: 300 CAPSULE ORAL at 08:39

## 2019-07-16 RX ADMIN — HYDROMORPHONE HYDROCHLORIDE 0.2 MG: 1 INJECTION, SOLUTION INTRAMUSCULAR; INTRAVENOUS; SUBCUTANEOUS at 12:01

## 2019-07-16 RX ADMIN — TRAZODONE HYDROCHLORIDE 100 MG: 50 TABLET ORAL at 01:36

## 2019-07-16 ASSESSMENT — ACTIVITIES OF DAILY LIVING (ADL)
WHICH_OF_THE_ABOVE_FUNCTIONAL_RISKS_HAD_A_RECENT_ONSET_OR_CHANGE?: AMBULATION;TRANSFERRING;TOILETING;BATHING;DRESSING
DRESS: 0-->INDEPENDENT
ADLS_ACUITY_SCORE: 16
RETIRED_EATING: 0-->INDEPENDENT
ADLS_ACUITY_SCORE: 16

## 2019-07-16 NOTE — PROGRESS NOTES
"WO Nurse Inpatient Wound Assessment   Reason for consultation: Evaluate and treat Left arm and shin wounds     Assessment  Left arm and skin wounds due to Trauma- fall on stairs in [place]; all wounds have eccymosis and thin frail skin noted to periwound. Skin flaps not yet re-approximated- was able to do so today as much as pt c/o pain allowed. Pt also had a crusted area still oozing blood from left parietal lobe area- not able to assess due to dried blood and hair. Attempted to clean with Microklenz and pt will continue to have chux under head with wound NARCISA.  Status: initial assessment    Treatment Plan  Left upper arm and elbow wounds: Daily and PRN if soiled  1. Even though vaseline gauze was used- please remove slowly and soak dressing with NS or wound cleanser to facilitate easier removal  2. Cleanse with Microklenz and gently pat dry  3. Cut vaseline gauze long enough to double layer to keep wound bed moist.   4. Cover with ABD pad and mónica 4\" wrap- secure with tape but no tape directly on skin.    Left anterior/lateral LE wounds: Daily and PRN if soiled  1. Even though vaseline gauze was used- please remove slowly and soak dressing with NS or wound cleanser to facilitate easier removal  2. Cleanse with Microklenz and gently pat dry  3. Cut vaseline gauze long enough to double layer to keep wound bed moist.   4. Cover with ABD pad and mónica 4\" wrap- secure with tape but no tape directly on skin.    Orders Written  WOC Nurse follow-up plan:weekly  Nursing to notify the Provider(s) and re-consult the WO Nurse if wound(s) deteriorates or new skin concern.    Patient History  According to provider note(s):  Marie Kline is an 87-year-old female with past medical history significant for chronic obstructive pulmonary disease on home oxygen as needed, cardiomyopathy, hypertension, hyperlipidemia, chronic lymphocytic leukemia for which she gets infusion every month, followup with Dr. Isabel Ivory, hypothyroidism, bladder " cancer among other history who presented today to the emergency room status post fall.  The patient lives in her own independent side of senior living facility.  While going up stairs holding her dog, she had 1 leg up on a step and lost her balance and fell backward and hit her head.  She did not lose consciousness, but sustained a hematoma to the occipital area as well as significant trauma to her left shoulder and also extensive laceration on her left leg.  The patient stated losing her balance is not new for her, but this time it happened while she was on the stairs.  The patient was brought to the emergency room by ambulance.  The patient denied any neck pain, no abdominal pain, no chest pain, no shortness of breath.  The patient is pretty independent and does not use any cane or walker in her apartment.  In the emergency room, she was evaluated.  She had extensive skin tear on the left side and there is significant pain right shoulder, axilla, and left shoulder and also oozing of blood from her occipital area, and CT head done without acute abnormality and also left shoulder showed nondisplaced fracture of the proximal surgical neck.     Pt has opted for non-surgical treatment of fractures.    Objective Data  Containment of urine/stool: Continent of bowel and Continent of bladder    Active Diet Order  Orders Placed This Encounter      Combination Diet Regular Diet Adult      Output:   I/O last 3 completed shifts:  In: 240 [P.O.:240]  Out: 400 [Urine:400]    Risk Assessment:      Moisture: 4-->rarely moist  Activity: 3-->walks occasionally  Mobility: 3-->slightly limited  Nutrition: 2-->probably inadequate  Friction and Shear: 2-->potential problem                             Labs:   Recent Labs   Lab 07/16/19  0733   HGB 8.8*   .3*       Physical Exam  Skin inspection: focused Left upper and lower extremity    Wound Location:  Left lateral upper arm    Date of last photo 7/16/19  Wound History: skin tear  sustained during fall on 7/15/19  Measurements (length x width x depth, in cm) 5 cm x 7 cm  x  0.1 cm   Wound Base:  dermis exposed; was able to approximate skin tear with epidermis as shown  Tunneling N/A  Undermining N/A  Palpation of the wound bed: normal   Periwound skin: ecchymosis; skin very frali and thin  Color: pink, purple and red  Temperature: normal   Drainage:, moderate  Description of drainage: serosanguinous and bloody  Odor: none  Pain: during dressing change, intermittent, sharp and burning    Wound Location:  Left posterior elbow    Date of last photo 7/16/19  Wound History: skin tear sustained during fall on 7/15/19  Measurements (length x width x depth, in cm) 4 cm x 5 cm  x  0.1 cm   Wound Base:  dermis exposed  Tunneling N/A  Undermining N/A  Palpation of the wound bed: normal   Periwound skin: ecchymosis; skin very frali and thin  Color: pink, purple and red  Temperature: normal   Drainage:, moderate  Description of drainage: serosanguinous and bloody  Odor: none  Pain: during dressing change, intermittent, sharp and burning      Wound Location:  Left lateral lower leg    Date of last photo 7/16/19  Wound History: skin tear sustained during fall on 7/15/19  Measurements (length x width x depth, in cm) 12.5 cm x 8.5 cm  x  0.1 cm   Wound Base:  dermis exposed; was able to approximate 70% of skin tear with epidermis after photo taken  Tunneling N/A  Undermining N/A  Palpation of the wound bed: normal   Periwound skin: ecchymosis; skin very frali and thin  Color: pink, purple and red  Temperature: normal   Drainage:, moderate  Description of drainage: serosanguinous and bloody  Odor: none  Pain: during dressing change, intermittent, sharp and burning    Interventions  Current support surface: Standard  Atmos Air mattress  Current off-loading measures: Pillows under calves  Visual inspection of wound(s) completed  Wound Care: completed by RN  Supplies: gathered, placed at the bedside, discussed  with RN and discussed with patient  Education provided: plan of care and wound progress  Discussed plan of care with Patient and Nurse    Millicent Mustafa, RN

## 2019-07-16 NOTE — PROGRESS NOTES
Care Coordination:  CTS is following for elevated CARLOS MANUEL score and readmission. Pt is well known to CTS from previous admissions. Pt has had 3 admits in the last 6 months. She has a history of COPD, CKD, and leukemia. She is identified as high risk for readmission. She is now admitted after a fall walking her dog and has a L humerus fracture. Ortho is following and recommending non operative management. Pt currently lives at the [facility] with services for [facility] RN. She uses home oxygen at 1-2L. Her daughter Margaret is involved and supportive to pt. She is followed closely by Clinic Care Coordinator Babs Garcia. SW is following for possible TCU needs. Awaiting PT/OT recommendations for discharge plan.     Will call handoff to Babs Garcia [phone] on discharge with plan of care. She will cont to follow pt post discharge.    Cherise Galeano RN CTS

## 2019-07-16 NOTE — PROGRESS NOTES
[facility]      OUTPATIENT PHYSICAL THERAPY EVALUATION  PLAN OF TREATMENT FOR OUTPATIENT REHABILITATION  (COMPLETE FOR INITIAL CLAIMS ONLY)  Patient's Last Name, First Name, M.I.  YOB: 1932  Marie Kline                        Provider's Name  [facility] Medical Record No.  [id number]                               Onset Date:  07/15/19   Start of Care Date:         Type:     _X_PT   ___OT   ___SLP Medical Diagnosis:                           PT Diagnosis:  difficulty with all mob   Visits from SOC:  1   _________________________________________________________________________________  Plan of Treatment/Functional Goals    Planned Interventions:  ,    bed mobility training, gait training, ROM, strengthening, transfer training,       Goals: See Physical Therapy Goals on Care Plan in [employer] electronic health record.    Therapy Frequency: Daily  Predicted Duration of Therapy Intervention: 3 days  _________________________________________________________________________________    I CERTIFY THE NEED FOR THESE SERVICES FURNISHED UNDER        THIS PLAN OF TREATMENT AND WHILE UNDER MY CARE     (Physician co-signature of this document indicates review and certification of the therapy plan).                 ,      Referring Physician: Lokesh Slaughter            Initial Assessment        See Physical Therapy evaluation dated   in Epic electronic health record.

## 2019-07-16 NOTE — PROGRESS NOTES
07/16/19 1400   Quick Adds   Type of Visit Initial PT Evaluation   Living Environment   Lives With alone   Living Arrangements independent living facility;apartment   Home Accessibility no concerns   Self-Care   Regular Exercise Yes   Activity/Exercise Type walking   Exercise Amount/Frequency daily   Equipment Currently Used at Home walker, rolling;grab bar, toilet;grab bar, tub/shower;raised toilet   Functional Level Prior   Ambulation 1-->assistive equipment   Transferring 1-->assistive equipment   Toileting 0-->independent   Bathing 0-->independent   Communication 0-->understands/communicates without difficulty   Swallowing 0-->swallows foods/liquids without difficulty   Cognition 0 - no cognition issues reported   Fall history within last six months yes   Number of times patient has fallen within last six months 1   Prior Functional Level Comment gets assessed for houise cleaning and uses dining room for some meals but can make her own   General Information   Onset of Illness/Injury or Date of Surgery - Date 07/15/19   Referring Physician Lokesh Slaughter   Patient/Family Goals Statement Pt thinking she will go home from her and have her dtr help   Pertinent History of Current Problem (include personal factors and/or comorbidities that impact the POC) Pt admitted after fall with fx shoulder and hit her head, was alking her dog   Precautions/Limitations fall precautions   Weight-Bearing Status - LUE nonweight-bearing   Weight-Bearing Status - RUE full weight-bearing   Weight-Bearing Status - LLE full weight-bearing   Weight-Bearing Status - RLE full weight-bearing   Cognitive Status Examination   Orientation orientation to person, place and time   Level of Consciousness alert   Follows Commands and Answers Questions 100% of the time   Personal Safety and Judgment intact   Memory intact   Pain Assessment   Patient Currently in Pain Yes, see Vital Sign flowsheet  (9-10/10)   Integumentary/Edema  "  Integumentary/Edema Comments head laceration, L UE brusing and skin tear   Range of Motion (ROM)   ROM Comment limited L UE ROM   Strength   Strength Comments decreased LE strength, needing A x 2 sit to stand, limited by pain and NWB L UE   Bed Mobility   Bed Mobility Comments bed mob with A x 2   Transfer Skills   Transfer Comments sit to stand with A x 2    Gait   Gait Comments unable   Balance   Balance Comments decreased standing balance requires A and UE to maintain   General Therapy Interventions   Planned Therapy Interventions bed mobility training;gait training;ROM;strengthening;transfer training   Clinical Impression   Criteria for Skilled Therapeutic Intervention yes, treatment indicated   PT Diagnosis difficulty with all mob   Influenced by the following impairments pain, fx shoulder with NWB,L, multiple bruising and skin tears from fall, decreased strength, balance and act ross   Functional limitations due to impairments impaired functional mob I ands safety   Clinical Presentation Evolving/Changing   Clinical Presentation Rationale 3-5 deficits   Clinical Decision Making (Complexity) Moderate complexity   Therapy Frequency Daily   Predicted Duration of Therapy Intervention (days/wks) 3 days   Anticipated Discharge Disposition Acute Rehabilitation Facility   Risk & Benefits of therapy have been explained Yes   Patient, Family & other staff in agreement with plan of care Yes   [facility] [employer] TM \"6 Clicks\"   2016, Trustees of [facility], under license to [employer].  All rights reserved.   6 Clicks Short Forms Basic Mobility Inpatient Short Form   [facility] [employer]  \"6 Clicks\" V.2 Basic Mobility Inpatient Short Form   1. Turning from your back to your side while in a flat bed without using bedrails? 2 - A Lot   2. Moving from lying on your back to sitting on the side of a flat bed without using bedrails? 2 - A Lot   3. Moving to and from a bed to a chair (including a wheelchair)? " 2 - A Lot   4. Standing up from a chair using your arms (e.g., wheelchair, or bedside chair)? 2 - A Lot   5. To walk in hospital room? 1 - Total   6. Climbing 3-5 steps with a railing? 1 - Total   Basic Mobility Raw Score (Score out of 24.Lower scores equate to lower levels of function) 10   Total Evaluation Time   Total Evaluation Time (Minutes) 13

## 2019-07-16 NOTE — ED NOTES
RECEIVING UNIT ED HANDOFF REVIEW    Above ED Nurse Handoff Report was reviewed:  YES  Reviewed by: Tiffany Espinoza on July 15, 2019 at 11:47 PM   Did you vocera the ED RN:   [facility]  ED Nurse Handoff Report    Marie Kline is a 87 year old female   ED Chief complaint: Fall  . ED Diagnosis:   Final diagnoses:   None     Allergies:   Allergies   Allergen Reactions     Diagnostic X-Ray Materials Hives     CT DYE     Contrast Dye Hives     CT DYE     Prolia [Denosumab]      Osteonecrosis jaw       Wound Dressing Adhesive        Code Status: Full Code  Activity level - Baseline/Home:  Assist X 2. Activity Level - Current:   Assist X 2. Lift room needed: Yes. Bariatric: No   Needed: No   Isolation: No. Infection: Not Applicable.     Vital Signs: With NC 2LPM  Vitals:    07/15/19 2115 07/15/19 2120 07/15/19 2125 07/15/19 2135   BP:    119/68   Pulse:    63   Resp: 20 15 20 22   Temp:       TempSrc:       SpO2: 95% 96% 98% 97%       Cardiac Rhythm:  ,      Pain level: 0-10 Pain Scale: 10(10/10 with movement)  Patient confused: No. Patient Falls Risk: Yes.   Elimination Status: Has voided   Patient Report - Initial Complaint: Fall with left shoulder displacement. Focused Assessment: Musculoskeletal - General Mobility: generalized weakness  LUE Extremity Movement: active ROM moderately impaired  RUE Extremity Movement: mobility appropriate for age  LLE Extremity Movement: active ROM moderately impaired  RLE Extremity Movement: mobility appropriate for age  LLE Weight-Bearing Status: weight-bearing as tolerated  RLE Weight-Bearing Status: weight-bearing as tolerated  CMS Intact: Yes   Cognitive - Cognitive/Neuro/Behavioral WDL: level of consciousness; orientation  Level Of Consciousness: alert  Orientation: oriented x 4   Rampart Coma Scale - Best Eye Response: 4-->(E4) spontaneous  Best Motor Response: 6-->(M6) obeys commands  Best Verbal Response: 5-->(V5) oriented  Rampart Coma Scale Score: 15    Tests Performed:   Labs Ordered and Resulted from Time of ED Arrival Up to the Time of Departure from the ED   CBC WITH PLATELETS DIFFERENTIAL - Abnormal; Notable for the following components:       Result Value    .8 (*)     RBC Count 3.77 (*)     Hemoglobin 10.9 (*)     MCHC 29.7 (*)     RDW 17.8 (*)     Absolute Neutrophil 10.9 (*)     Absolute Lymphocytes 114.9 (*)     Absolute Monocytes 10.9 (*)     All other components within normal limits   BASIC METABOLIC PANEL - Abnormal; Notable for the following components:    Glucose 128 (*)     GFR Estimate 48 (*)     GFR Estimate If Black 56 (*)     All other components within normal limits       Treatments provided:   Medications   HYDROmorphone (DILAUDID) injection 0.2 mg (0.2 mg Intravenous Given 7/15/19 2010)   HYDROmorphone (DILAUDID) injection 0.2 mg (0.2 mg Intravenous Given 7/15/19 1920)   oxyCODONE (ROXICODONE) solution 3 mg (3 mg Oral Given 7/15/19 2129)   acetaminophen (TYLENOL) tablet 1,000 mg (1,000 mg Oral Given 7/15/19 2129)       Family Comments: By selft  OBS brochure/video discussed/provided to patient:  No  ED Medications:   Medications   HYDROmorphone (DILAUDID) injection 0.2 mg (0.2 mg Intravenous Given 7/15/19 2010)   HYDROmorphone (DILAUDID) injection 0.2 mg (0.2 mg Intravenous Given 7/15/19 1920)   oxyCODONE (ROXICODONE) solution 3 mg (3 mg Oral Given 7/15/19 2129)   acetaminophen (TYLENOL) tablet 1,000 mg (1,000 mg Oral Given 7/15/19 2129)     Drips infusing:  No  For the majority of the shift, the patient's behavior Green. Interventions performed were N/A.     Severe Sepsis OR Septic Shock Diagnosis Present: No      ED Nurse Name/Phone Number: Kenneth Downs,   9:38 PM

## 2019-07-16 NOTE — CONSULTS
Consult Date:  07/16/2019      ORTHOPEDIC CONSULTATION      REASON FOR CONSULTATION:  I was asked by Dr. Slaughter to provide orthopedic consultation for this 87-year-old female who fell in her home yesterday and sustained a left proximal humerus fracture.  She has no prior history of shoulder problems.  She is medically complicated.        PAST MEDICAL HISTORY:  Includes a history of severe COPD, chronic lymphocytic leukemia, history of bladder cancer, history of atrial fibrillation.  You can refer to her admission history and physical for specifics of her past medical history.      PAST SURGICAL HISTORY:  Includes a left hip hemiarthroplasty for a femoral neck fracture.      REVIEW OF SYSTEMS:  She and her daughter reports that she has had no recent fever, chills, nausea, vomiting, diarrhea, chest pain or shortness of breath.      PHYSICAL EXAMINATION:   GENERAL:  She is a cachectic-appearing 87-year-old, who is alert and oriented x 3.  She is pleasant and in no acute distress.   VITAL SIGNS:  She is afebrile.  Vital signs stable.   HEENT:  She has a large laceration of the scalp.     EXTREMITIES:  Her left arm is in a sling and she has extensive ecchymosis about the left shoulder.  She has intact flexion/extension of the wrist and digits on the left upper extremity.  Intact flexion/extension of the elbow.  She has full painless range of motion of the right shoulder, right elbow and right wrist and fingers.  Benign-appearing surgical scar about her left hip.  Painless range of motion of both lower extremities.   SKIN:  Intact in both upper and lower extremities.  Both upper and lower extremities are well perfused.      IMAGING:  X-rays reviewed, AP, lateral and axillary views of the left shoulder, which show a displaced proximal humerus fracture.  It is a 2-part fracture.  The fracture is through the anatomic neck.      ASSESSMENT:  Medically complicated 87-year-old female with a left proximal humerus fracture.   There is no comminution of the fracture.  I discussed treatment options, both operative and nonoperative; recommended nonoperative treatment.  Plan is to see her back in clinic in 2 weeks' time.  I suspect that her alignment will be adequate and that she will heal without surgery.  The daughter was interested in surgery.  I explained the risks of the surgery and the potential outcomes and how likely if this heals without surgery, she would do just as well, maybe even better than surgery.  Plan is to continue nonoperative treatment.  I will see her back in clinic in 2 weeks.  She should remain in a sling.         ELISABETH GOODWIN MD             D: 2019   T: 2019   MT: [id number]      Name:     MAGGI RODRIGUEZ   MRN:      [id number]        Account:       [id number]   :      1932           Consult Date:  2019      Document: [id number]       cc: Lokesh Slaughter MD

## 2019-07-16 NOTE — PLAN OF CARE
Discharge Planner PT   Patient plan for discharge: none stated  Current status: PT eval completed and treatment initiated.  Pt admitted after fall on steps while walking her dog, sustaining L shoulder fx, NWB, and multiple skin tears and bruising, also pain in buttocks and L hip.  Pt previously I at her I living apt, with cleaning services and some meals taken in common dining room.   Pt currently with c/o of severe pain and nausea and dizziness, A of 2 to get up to BSC, unable to ross steps with attempts to walk, mod A x 2 sit to stand from commode.  Barriers to return to prior living situation: level of A for all mob, unable to ross amb, will need A with all mob and ADL's with L shoulder fx, wound care concerns, safety concerns with risk of falls  Recommendations for discharge: ARU  Rationale for recommendations: Pt previously I at her apt, active and motivated to participate, pt will benefit from skilled services to restore strength, balance and act ross to progress functional mob I and safety to allow return to home and PLOF.  Anticipate pt will ross 3 hours of therapies with cont progress.       Entered by: ADRIANA DUNBAR 07/16/2019 4:32 PM

## 2019-07-16 NOTE — PLAN OF CARE
Arrived to room 603 from ER at 0020 via cart, alert and oriented x 4, oriented to room and call system, IV patent and infusing,rates pain 7/10. Painful to reposition. VSS: on 2l oxygen nc. Head abrasion and hematoma, left elbow skin peeling off,left leg/shin skin peeling off an bloody, abrasions, bruising on left shoulder. Wound cleansed and new dressing applied. Bed bath done. Pain managed with scheduled Trazodone and PRN IV Dilaudid. Not able to void, Bladder scanned for 390 ml straight cath for 400 ml. Fluids encouraged. Nursing continue to monitor.

## 2019-07-16 NOTE — PHARMACY-ADMISSION MEDICATION HISTORY
Admission medication history interview status for this patient is complete. See [facility] admission navigator for allergy information, prior to admission medications and immunization status.     Medication history interview source(s):Patient  Medication history resources (including written lists, pill bottles, clinic record):None  [facility]    Changes made to PTA medication list:  Added: symbicort  Deleted: augmentin, culturell, prednisone, dulera  Changed: aspirin to 81 mg    Actions taken by pharmacist (provider contacted, etc):None     Additional medication history information:None    Medication reconciliation/reorder completed by provider prior to medication history? No    Do you take OTC medications (eg tylenol, ibuprofen, fish oil, eye/ear drops, etc)? yes(Y/N)    For patients on insulin therapy: no (Y/N)  Lantus/levemir/NPH/Mix 70/30 dose:   (Y/N) (see Med list for doses)   Sliding scale Novolog Y/N  If Yes, do you have a baseline novolog pre-meal dose:  units with meals  Patients eat three meals a day:   Y/N    How many episodes of hypoglycemia do you have per week: _______  How many missed doses do you have per week: ______  How many times do you check your blood glucose per day: _______  Do you have a Continuous glucose monitor (CGM)   Y/N (remind pt that not approved for hospital use)   Any Barriers to therapy - Be specific :  cost of medications, comfortable with giving injections (if applicable), comfortable and confident with current diabetes regimen: Y/N ______________      Prior to Admission medications    Medication Sig Last Dose Taking? Auth Provider   albuterol (PROAIR HFA/PROVENTIL HFA/VENTOLIN HFA) 108 (90 BASE) MCG/ACT Inhaler Inhale 2 puffs into the lungs every 6 hours as needed for wheezing 7/15/2019 at Unknown time Yes Piper Kiser MD   albuterol (PROVENTIL) (2.5 MG/3ML) 0.083% neb solution Take 2.5 mg by nebulization every 6 hours as needed for shortness of breath /  dyspnea or wheezing 7/15/2019 at Unknown time Yes Reported, Patient   aspirin 81 MG EC tablet Take 81 mg by mouth daily 7/15/2019 at Unknown time Yes Unknown, Entered By History   atorvastatin (LIPITOR) 20 MG tablet Take 1 tablet (20 mg) by mouth daily 7/15/2019 at Unknown time Yes Piper Kiser MD   budesonide-formoterol (SYMBICORT) 80-4.5 MCG/ACT Inhaler Inhale 2 puffs into the lungs 2 times daily 7/15/2019 at Unknown time Yes Unknown, Entered By History   calcium carbonate-vitamin D (OS-CARLOS) 500-400 MG-UNIT tablet Take 1 tablet by mouth daily 7/15/2019 at Unknown time Yes Reported, Patient   ferrous sulfate (IRON) 325 (65 FE) MG tablet Take 325 mg by mouth daily (with breakfast)  7/15/2019 at Unknown time Yes Reported, Patient   furosemide (LASIX) 20 MG tablet Take 1 tablet (20 mg) by mouth daily 7/15/2019 at Unknown time Yes Piper Kiser MD   gabapentin (NEURONTIN) 300 MG capsule Take 300 mg by mouth 2 times daily 7/15/2019 at Unknown time Yes Unknown, Entered By History   Immune Globulin, Human, (OCTAGAM IV) Inject 300 mg/kg into the vein every 30 days Past Month at Unknown time Yes Unknown, Entered By History   levothyroxine (SYNTHROID/LEVOTHROID) 75 MCG tablet Take 1 tablet (75 mcg) by mouth daily 7/14/2019 at Unknown time Yes Piper Kiser MD   metoprolol succinate ER (TOPROL-XL) 25 MG 24 hr tablet Take 1 tablet (25 mg) by mouth daily 7/15/2019 at Unknown time Yes Piper Kiser MD   Multiple Vitamins-Minerals (MULTIVITAMIN GUMMIES ADULT PO) Take 2 tablets by mouth daily  7/15/2019 at Unknown time Yes Reported, Patient   sertraline (ZOLOFT) 50 MG tablet Take 1 tablet (50 mg) by mouth daily 7/15/2019 at Unknown time Yes Piper Kiser MD   traMADol (ULTRAM) 50 MG tablet Take 50 mg by mouth every 6 hours as needed for moderate to severe pain  7/14/2019 at Unknown time Yes Reported, Patient   traZODone (DESYREL) 50 MG tablet Take 2 tablets (100 mg) by mouth At Bedtime 7/14/2019  at Unknown time Yes Piper Kiser MD

## 2019-07-16 NOTE — H&P
Admitted:     07/15/2019      CHIEF COMPLAINT:  Mechanical fall, sustained trauma to her left shoulder and left leg.      HISTORY OF PRESENT ILLNESS:  Marie Kline is an 87-year-old female with past medical history significant for chronic obstructive pulmonary disease on home oxygen as needed, cardiomyopathy, hypertension, hyperlipidemia, chronic lymphocytic leukemia for which she gets infusion every month, and has a followup with Dr. Isabel Ivory, hypothyroidism, bladder cancer, who presents today to the emergency room status post fall.  The patient lives in her own independent side of senior living facility.  While going up stairs holding her dog, she had 1 leg up on a step and lost her balance and fell backward and hit her head.  She did not lose consciousness, but sustained a trauma to the occipital area as well as significant trauma to her left shoulder and also extensive laceration on her left leg.  The patient stated losing her balance is not new for her, but this time it happened while she was on the stairs.  The patient was brought to the emergency room by ambulance.  The patient denied any neck pain, no abdominal pain, no chest pain, no shortness of breath.  The patient is pretty independent and does not use any cane or walker in her apartment.  In the emergency room, she was evaluated.  She had extensive skin tear on the left side and there is significant pain right shoulder, axilla, and left shoulder and also oozing of blood from her occipital area, and CT head done without acute abnormality and also left shoulder showed nondisplaced fracture of the proximal surgical neck.  The patient is being admitted to the hospital.      PAST MEDICAL HISTORY:   1.  Chronic respiratory failure due to COPD, on oxygen, as needed.   2.  Chronic kidney disease.   3.  Herpes labialis.   4.  Malnutrition.   5.  Chronic lymphocytic leukemia.  Followup with [facility].   6.  Chronic kidney disease.   7.  Anxiety.   8.   Bladder cancer.   9.  Hypothyroidism.   10.  Osteonecrosis of the jaw.   11.  Hypertension.   12.  Antibiotic-induced diarrhea.      PAST SURGICAL HISTORY:  Right inguinal lymph node biopsy, bladder surgery and biopsy/resection of bladder, left hip arthroplasty.      FAMILY HISTORY:  Reviewed and noncontributory to her current medical condition.      ALLERGIES:  CONTRAST DYE, PROLIA.      HOME MEDICATIONS:  Reviewed and reconciled as in electronic medical record.      REVIEW OF SYSTEMS:  Ten points reviewed, all are negative except those mentioned in history of present illness.      PHYSICAL EXAMINATION:   GENERAL:  The patient is awake, alert, oriented, not in distress.   VITAL SIGNS:  Blood pressure 119/60, pulse rate 63, temperature 98.0, oxygen saturation 98% on 2 liters nasal cannula.   HEENT:  Atraumatic scalp with oozing of blood and small hematoma on the occipital area.   NECK:  Supple, no JVD, no thyromegaly.   CHEST:  Good air entry bilaterally, prolonged expiratory phase, scattered wheezing.   CARDIOVASCULAR:  S1, S2 were heard.  No gallop or murmur.   ABDOMEN:  Soft, nontender, nondistended, positive bowel sounds.   EXTREMITIES:  Extensive skin tears below the knee on the left lateral side down to the ankle area.   NEUROLOGIC:  No focal neurologic deficit.  Moves her extremities. Left upper extremity unable to move due to pain and fracture.   PSYCHIATRIC:  Normal mood and affect, keeps eye contact, responds to question appropriately.      DIAGNOSTIC:  Sodium 135, potassium 4.5, BUN 16, creatinine 1.4, calcium 8.8.  ,000, hemoglobin 10.9, platelets 174,000 and phosphate 84%.  CT scan of the head without contrast:  No acute abnormality or bleeding.  X-ray of her left hand: There is an age indeterminate minimally displaced fracture of the scaphoid waist and dorsal triquetrum.  Normal joint alignment maintained   Degenerative change.  X-ray of the left shoulder:  Acute comminuted fracture of the  proximal left humerus.  It is across the surgical neck with lateral displacement of the distal fragment.  X-ray of the tibia and fibula, no acute fracture.  X-ray of the left hip not done.      ASSESSMENT:  Marie Kline, is an 87-year-old female with extensive past medical history including chronic obstructive pulmonary disease on oxygen, cardiomyopathy, hypertension, chronic lymphocytic leukemia, hypothyroidism who presented today to the emergency room status post mechanical fall on the stairs after she felt dizzy and sustained trauma to her left shoulder and also left leg and found to have left shoulder fracture and being admitted to the hospital for further evaluation and management.   1.  Fall on the stairs, suspected mechanical.   2.  Left shoulder fracture secondary to fall.   3.  Extensive skin tear, left lower extremity.   4.  Scalp hematoma secondary to fall and trauma..   5.  Chronic medical conditions including chronic obstructive pulmonary disease, hypothyroidism, stable.   6.  Chronic lymphocytic leukemia.  The patient has followup with Hematology/Oncology.  White count is significantly elevated at this time.      PLAN:  The patient is being admitted as an inpatient.  I expect 2-3 night stay in the hospital.  Pain control.  Wound care consulted.  She has extensive skin tear.  Wound dressing being done in the emergency room.  For left shoulder fracture she will have an arm sling and Orthopedic Surgery is consulted.  There is also minor fracture of the wrist, the age of which is not determined.  It needs to be reevaluated.  Left hip x-ray is not done.  At this time, the patient also complaining of hip pain, we will consider getting left hip x-ray when patient is stablized.  We will continue most of her home medication, but hold off aspirin, diuretics and beta blockers for now which may be restarted.  She will be on her bronchodilators.  The patient is on immunoglobulin every 30 days for her current  Chronic  lymphocytic leukemia, and will have followup as an outpatient.  If there are any issues or concerns, Oncology can be consulted or called.      CODE STATUS:  The patient is do not intubate, do not resuscitate.         MARIEL BUTLER MD             D: 07/15/2019   T: 2019   MT: MERLENE      Name:     MAGGI RODRIGUEZ   MRN:      [id number]        Account:      [id number]   :      1932        Admitted:     07/15/2019                   Document: [id number]       cc: Piper Kiser MD

## 2019-07-16 NOTE — PROGRESS NOTES
Clinic Care Coordination Contact    RN CC received VM from patient stating she needed assistance with medication refill.    RN CC reviewed chart and noted patient was now admitted to [facility] for treatment of left shoulder fracture secondary to mechanical fall.    RN CC was able to meet with patient at bedside, daughter Margaret was also present.  Patient was very thankful for visit.      Patient explains she was walking her dog, and slipped on the concrete stairs going into her apartment building.  She hit her head and shoulder.  Patient will likely go to TCU at discharge.  Margaret asked what facilities are close to the hospital.  RN CC shared this info with them.  Also explained the IP care coordination team will be assisting with placement.  Margaret stated she did NOT want her mother sent to [facility].  Will pass on this information to IP team.    Will continue to follow.     Babs Garcia RN  Care Coordination  Phone:  [phone]  Email: [email or web address]

## 2019-07-16 NOTE — PROGRESS NOTES
"[facility]  Daily Orthopedic Rounding Note         Assessment and Plan:    Assessment:   Reason for visit: left proximal humerus fracture   Bruising and swelling to left shoulder as expected. Wearing her sling with neck strap removed in bed.  Patient notes that she feels \"tired\" and \"loopy\"  Pain: 4/10      Plan:   Non-weight bearing to left upper extremity  -wear sling for comfort  -pain management  -PT/OT  -OK to discharge from ortho standpoint and follow up with Dr. Haskins in 2 weeks. Call [phone]  Physical Therapy      Plan:  Mobilize               Physical Exam:   129/68, 96.5, 65, 20  Swelling and eccymosis to left shoulder. TTP throughout shoulder and upper arm. Distal extremity is neurovascularly intact.           Data:   All pertinent laboratory data related to this patient encounter reviewed        Bibiana Silver PA-C  "

## 2019-07-16 NOTE — PLAN OF CARE
OT order received.  Evaluation initiated, however patient very tired and in significant pain, and asked therapy to return later so she could rest first.  PA present and clarified that NWB includes no platform walker.

## 2019-07-16 NOTE — PLAN OF CARE
Afeb, vss, cms to hand intact this am, did c/o severe wrist pain in am, in pm pain in her palm and some numbness in her palm. Left shoulder is bruised and swollen, arm in a sling. WOC nurse came and changed dressings and assessed her skin tear, and hematoma on her head. She wrote up a care plan for wound care. Wanted to sleep all morning since awake all night, let her sleep with the help of IV dilaudid twice and po tramadol in the afternoon. PT came to do an eval and when trying to get pt up to commode c/o severe pain in both buttocks when trying to stand. Had to be assisted by max assist of 2. Was only able to pivot to commode and then moved furniture to get her in the chair for 30min. Did not void since straight cathed at 0700. Was unable to void on commode, will scan and cath if needed. One liter bolus finished up at 1400. Pt had nothing to eat today due to nausea and wanting to sleep. Will text hospitalist to see about hip or pelvic xrays.

## 2019-07-16 NOTE — PROGRESS NOTES
[facility]  Hospitalist Progress Note  Ramón Vazquez MD 07/16/2019    Reason for Stay (Diagnosis):     Fall     left proximal humerus fracture         Assessment and Plan:      Summary of Stay: Marie Kline, is an 87-year-old female with extensive past medical history including chronic obstructive pulmonary disease on oxygen, cardiomyopathy, hypertension, chronic lymphocytic leukemia, hypothyroidism who presented today to the emergency room status post mechanical fall on the stairs after she felt dizzy and sustained trauma to her left shoulder and also left leg and found to have left shoulder fracture and being admitted to the hospital for further evaluation and management.    Problem List:   1.  Fall on the stairs, suspected mechanical.   -- fall precaution , rehab     2.  Left shoulder fracture secondary to fall.   -- seen by ortho team,non operative intervention noted   -- pain control , immobilize in arm sling     3.  Extensive skin tear, left lower extremity.   -- wound care   4.  Scalp hematoma secondary to fall.   -- no intracranial acute injury     5.  Chronic medical conditions including chronic obstructive pulmonary disease, hypothyroidism, stable.     6.  Chronic lymphocytic leukemia.  The patient has followup with Hematology/Oncology.      7.Severe hip and buttock pain during PT session - will get x ray of pelvis and hips , pain control       Intake/Output Summary (Last 24 hours) at 7/16/2019 1610  Last data filed at 7/16/2019 1400  Gross per 24 hour   Intake 942 ml   Output 400 ml   Net 542 ml          DVT Prophylaxis: Pneumatic Compression Devices    Code Status: DNR / DNI    Discharge Dispo: TCU     Estimated Disch Date / # of Days until Disch: Unable to determine         Interval History (Subjective):      Patient is seen and examined by me today and medical record reviewed.Overnight events noted and care discussed with nursing staff.    Buttock pain and hip pain                              Physical Exam:      Last Vital Signs:  /69 (BP Location: Right arm)   Pulse 78   Temp 96.7  F (35.9  C) (Oral)   Resp 18   Wt 44.9 kg (99 lb)   SpO2 98%   BMI 20.00 kg/m        Constitutional: Awake, alert, cooperative, no apparent distress     Respiratory: Clear to auscultation bilaterally, no crackles or wheezing   Cardiovascular: Regular rate and rhythm, normal S1 and S2, and no murmur noted   Abdomen: Normal bowel sounds, soft, non-distended, non-tender   Skin: No rashes, no cyanosis, dry to touch   Neuro: Alert , no focal weakness, numbness,   Extremities: Limited l range of motion   Other(s):        All other systems: Negative          Medications:      All current medications were reviewed with changes reflected in problem list.         Data:      All new lab and imaging data was reviewed.      Data reviewed today: I reviewed all new labs and imaging results over the last 24 hours. I personally reviewed no images or EKG's today      Recent Labs   Lab 07/16/19  0733 07/15/19  1852   .3* 136.8*   HGB 8.8* 10.9*   HCT 29.4* 36.7   MCV 96 97   * 174     No results for input(s): CULT in the last 168 hours.  Recent Labs   Lab 07/16/19  0733 07/15/19  1852   * 135   POTASSIUM 5.8* 4.2   CHLORIDE 98 99   CO2 29 30   ANIONGAP 4 6   * 128*   BUN 19 16   CR 0.87 1.04   GFRESTIMATED 60* 48*   GFRESTBLACK 70 56*   CARLOS 8.5 8.8       Recent Labs   Lab 07/16/19  0733 07/15/19  1852   * 128*       No results for input(s): INR in the last 168 hours.      No results for input(s): TROPONIN, TROPI, TROPR in the last 168 hours.    Invalid input(s): TROP, TROPONINIES    Recent Results (from the past 48 hour(s))   Head CT w/o contrast    Narrative    CT SCAN OF THE HEAD WITHOUT CONTRAST   7/15/2019 8:23 PM     HISTORY: fall, L parietal/occiptal hematoma    TECHNIQUE:  Axial images of the head and coronal reformations without  IV contrast material. Radiation dose for this  scan was reduced using  automated exposure control, adjustment of the mA and/or kV according  to patient size, or iterative reconstruction technique.    COMPARISON: None.    FINDINGS:     Intracranial contents: There is diffuse parenchymal volume loss.   White matter changes are present in the cerebral hemispheres that are  consistent with small vessel ischemic disease in this age patient.  There is no evidence of intracranial hemorrhage, mass, acute infarct  or anomaly.    Visualized orbits/sinuses/mastoids:  The visualized portions of the  sinuses and mastoids appear normal.    Osseous structures/soft tissues:  There is soft tissue swelling over  the left parietal region. No intracranial hemorrhage or skull  fractures.      Impression    IMPRESSION: No intracranial hemorrhage or skull fractures are  identified.      PATRIZIA DORSEY MD   XR Hand Left G/E 3 Views    Narrative    HAND THREE VIEWS LEFT  7/15/2019 8:57 PM     HISTORY: fall, pain    COMPARISON: None.      Impression    IMPRESSION: Age-indeterminate minimally displaced fractures of the  scaphoid waist and dorsal triquetrum. Normal joint alignment  maintained. Degenerative changes throughout the hand and wrist.  Chondrocalcinosis of the triangle fibrocartilage. Osteopenia.    ALEA BEYER MD   XR Shoulder Left G/E 3 Views    Narrative    XR SHOULDER LT G/E 3 VW   7/15/2019 8:59 PM     HISTORY:  fall, pain    COMPARISON:  None      Impression    IMPRESSION: Acute comminuted fracture of the proximal left humerus.  There is a fracture line across the surgical neck with lateral  displacement of the distal fracture fragment. A nondisplaced fracture  line extends into the greater tuberosity. Normal joint alignment  maintained. Surrounding soft tissue swelling. Aortic atherosclerosis.    ALEA BEYER MD   XR Tibia & Fibula Left 2 Views    Narrative    XR TIBIA & FIBULA LT 2 VW   7/15/2019 8:59 PM     HISTORY:  fall,pain    COMPARISON:  None      Impression     IMPRESSION: No acute fracture identified. Mild degenerative changes of  the knee and ankle joints. Osteopenia.    ALEA BEYER MD         Disclaimer: This note consists of symbols derived from keyboarding, dictation and/or voice recognition software. As a result, there may be errors in the script that have gone undetected. Please consider this when interpreting information found in this chart

## 2019-07-17 ENCOUNTER — APPOINTMENT (OUTPATIENT)
Dept: CT IMAGING | Facility: CLINIC | Age: 84
DRG: 562 | End: 2019-07-17
Attending: PHYSICIAN ASSISTANT
Payer: MEDICARE

## 2019-07-17 LAB
ANION GAP SERPL CALCULATED.3IONS-SCNC: 3 MMOL/L (ref 3–14)
BUN SERPL-MCNC: 13 MG/DL (ref 7–30)
CALCIUM SERPL-MCNC: 8.4 MG/DL (ref 8.5–10.1)
CHLORIDE SERPL-SCNC: 90 MMOL/L (ref 94–109)
CO2 SERPL-SCNC: 29 MMOL/L (ref 20–32)
CREAT SERPL-MCNC: 0.68 MG/DL (ref 0.52–1.04)
GFR SERPL CREATININE-BSD FRML MDRD: 79 ML/MIN/{1.73_M2}
GLUCOSE SERPL-MCNC: 114 MG/DL (ref 70–99)
OSMOLALITY UR: 521 MMOL/KG (ref 100–1200)
POTASSIUM SERPL-SCNC: 5.4 MMOL/L (ref 3.4–5.3)
SODIUM SERPL-SCNC: 122 MMOL/L (ref 133–144)
SODIUM SERPL-SCNC: 123 MMOL/L (ref 133–144)
SODIUM SERPL-SCNC: 123 MMOL/L (ref 133–144)
SODIUM UR-SCNC: 21 MMOL/L

## 2019-07-17 PROCEDURE — 36415 COLL VENOUS BLD VENIPUNCTURE: CPT | Performed by: INTERNAL MEDICINE

## 2019-07-17 PROCEDURE — 99222 1ST HOSP IP/OBS MODERATE 55: CPT | Performed by: NURSE PRACTITIONER

## 2019-07-17 PROCEDURE — 12000000 ZZH R&B MED SURG/OB

## 2019-07-17 PROCEDURE — 99207 ZZC CONSULT E&M CHANGED TO INITIAL LEVEL: CPT | Performed by: NURSE PRACTITIONER

## 2019-07-17 PROCEDURE — 72192 CT PELVIS W/O DYE: CPT

## 2019-07-17 PROCEDURE — 25000125 ZZHC RX 250: Performed by: INTERNAL MEDICINE

## 2019-07-17 PROCEDURE — 40000275 ZZH STATISTIC RCP TIME EA 10 MIN

## 2019-07-17 PROCEDURE — 25000132 ZZH RX MED GY IP 250 OP 250 PS 637: Mod: GY | Performed by: NURSE PRACTITIONER

## 2019-07-17 PROCEDURE — 25000132 ZZH RX MED GY IP 250 OP 250 PS 637: Mod: GY | Performed by: INTERNAL MEDICINE

## 2019-07-17 PROCEDURE — 84295 ASSAY OF SERUM SODIUM: CPT | Performed by: INTERNAL MEDICINE

## 2019-07-17 PROCEDURE — 80048 BASIC METABOLIC PNL TOTAL CA: CPT | Performed by: INTERNAL MEDICINE

## 2019-07-17 PROCEDURE — 84300 ASSAY OF URINE SODIUM: CPT | Performed by: INTERNAL MEDICINE

## 2019-07-17 PROCEDURE — 99233 SBSQ HOSP IP/OBS HIGH 50: CPT | Performed by: INTERNAL MEDICINE

## 2019-07-17 PROCEDURE — 83935 ASSAY OF URINE OSMOLALITY: CPT | Performed by: INTERNAL MEDICINE

## 2019-07-17 PROCEDURE — 25800030 ZZH RX IP 258 OP 636: Performed by: INTERNAL MEDICINE

## 2019-07-17 PROCEDURE — 94640 AIRWAY INHALATION TREATMENT: CPT

## 2019-07-17 RX ORDER — ALPRAZOLAM 0.25 MG
0.25 TABLET ORAL 2 TIMES DAILY
Status: DISCONTINUED | OUTPATIENT
Start: 2019-07-17 | End: 2019-07-18

## 2019-07-17 RX ORDER — OXYCODONE HYDROCHLORIDE 5 MG/1
5 TABLET ORAL EVERY 4 HOURS PRN
Status: DISCONTINUED | OUTPATIENT
Start: 2019-07-17 | End: 2019-07-18

## 2019-07-17 RX ORDER — LIDOCAINE 4 G/G
2 PATCH TOPICAL
Status: DISCONTINUED | OUTPATIENT
Start: 2019-07-17 | End: 2019-07-24 | Stop reason: HOSPADM

## 2019-07-17 RX ORDER — AMOXICILLIN 250 MG
2 CAPSULE ORAL 2 TIMES DAILY
Status: DISCONTINUED | OUTPATIENT
Start: 2019-07-17 | End: 2019-07-24 | Stop reason: HOSPADM

## 2019-07-17 RX ORDER — SODIUM CHLORIDE 9 MG/ML
1000 INJECTION, SOLUTION INTRAVENOUS CONTINUOUS
Status: DISCONTINUED | OUTPATIENT
Start: 2019-07-17 | End: 2019-07-19

## 2019-07-17 RX ORDER — AMOXICILLIN 250 MG
1 CAPSULE ORAL 2 TIMES DAILY
Status: DISCONTINUED | OUTPATIENT
Start: 2019-07-17 | End: 2019-07-22

## 2019-07-17 RX ADMIN — SENNOSIDES AND DOCUSATE SODIUM 1 TABLET: 8.6; 5 TABLET ORAL at 19:41

## 2019-07-17 RX ADMIN — ALBUTEROL SULFATE 2.5 MG: 2.5 SOLUTION RESPIRATORY (INHALATION) at 08:25

## 2019-07-17 RX ADMIN — LIDOCAINE 2 PATCH: 560 PATCH PERCUTANEOUS; TOPICAL; TRANSDERMAL at 19:42

## 2019-07-17 RX ADMIN — GABAPENTIN 300 MG: 300 CAPSULE ORAL at 08:40

## 2019-07-17 RX ADMIN — SODIUM CHLORIDE 1000 ML: 9 INJECTION, SOLUTION INTRAVENOUS at 16:20

## 2019-07-17 RX ADMIN — ATORVASTATIN CALCIUM 20 MG: 20 TABLET, FILM COATED ORAL at 08:40

## 2019-07-17 RX ADMIN — GABAPENTIN 300 MG: 300 CAPSULE ORAL at 19:42

## 2019-07-17 RX ADMIN — ALPRAZOLAM 0.25 MG: 0.25 TABLET ORAL at 19:42

## 2019-07-17 RX ADMIN — FLUTICASONE FUROATE AND VILANTEROL TRIFENATATE 1 PUFF: 100; 25 POWDER RESPIRATORY (INHALATION) at 08:26

## 2019-07-17 RX ADMIN — TRAMADOL HYDROCHLORIDE 50 MG: 50 TABLET, COATED ORAL at 09:34

## 2019-07-17 RX ADMIN — LEVOTHYROXINE SODIUM 75 MCG: 75 TABLET ORAL at 08:40

## 2019-07-17 RX ADMIN — TRAZODONE HYDROCHLORIDE 50 MG: 50 TABLET ORAL at 22:44

## 2019-07-17 RX ADMIN — OXYCODONE HYDROCHLORIDE 5 MG: 5 TABLET ORAL at 14:39

## 2019-07-17 ASSESSMENT — ACTIVITIES OF DAILY LIVING (ADL)
ADLS_ACUITY_SCORE: 16

## 2019-07-17 NOTE — PLAN OF CARE
OT: Met with pt and pt's dtr, educated on role of OT and potential plan of care, pt and dtr receptive, pt declined activity at this time as recently repositioned in bed with nursing assist and feeling exhausted and painful in addition pt noted some effort with her breathing, requested nebulizer treatment, updated RN; pt noted to require increased time with thought processing and conversation, some difficulty word finding  In addition pt noted to now have several pelvic fxs including some displacement    Re-attempted session x2, pt busy with multiple providers

## 2019-07-17 NOTE — PROGRESS NOTES
"SPIRITUAL HEALTH SERVICES Progress Note  [facility] Ortho/Spine    [facility] visit per pt request.    Introduced myself and oriented pt, Marie, to [facility].  Pt reported that she fell at her [facility] and fractured both hips, hit her head, and broke several other bones.    Pt was in a lot of discomfort during [facility] visit and spent much of our time together adjusting to alleviate her pain.  Pt expressed feeling scared and disoriented at time of the event and is grateful to be in the hospital recovering.    Pt identifies as Denominational and enjoys visits from [facility].  Pt states: \"God is great. God is good. He'll get me through this, just like he's gotten me through everything else.\"    Pt spoke of her daughter, Margaret, as her primary support and caregiver.  Pt cited 2 other children as well as various friends and family members as her support system.  Pt briefly spoke of her oldest daughter who  when she was twelve from a brain bleed.  Pt reports feeling \"uneasy\" about her fall because she hit her head, which prompted her to think about her daughter's death.    Pt became very tired after receiving pain medication and requested that I follow up with her tomorrow.  Will f/u with pt in the morning.    Plan: [facility] remains available for spiritual/emotional support.      Melanie Luna   Intern  Pager: [phone]  "

## 2019-07-17 NOTE — CONSULTS
Care Transition Initial Assessment - SW     Met with: Patient and spoke to dr Robles via telephone per patient request    Active Problems:    Shoulder fracture       DATA  Lives With: alone   Living Arrangements: independent living facility, apartment- [facility]  Description of Support System: Supportive, Involved  Who is your support system?: Children, Facility resident(s)/Staff  Identified issues/concerns regarding health management: discharge needs   Resources List: Skilled Nursing Facility  Transportation Anticipated: agency    ASSESSMENT  Cognitive Status:  awake, alert and oriented  Concerns to be addressed: Need for TCU and transportation at discharge  SW consulted to assist with discharge planning, emotional support and transportation arrangements.  Pt is an 87 female who admitted on 7/15/19 after a fall on the stairs at her [facility], resulting in left arm fracture, pelvic fractures and occipital scalp contusion. Pt previously lived at [facility] in [place] and is followed by clinic CC Babs Garcia.   PLAN  Financial costs for the patient includes TBD .  Patient given options and choices for discharge: SNF list offered  Patient/family is agreeable to the plan?  Yes: Patient asked this writer to call her dtr Margaret for TCU choices. Margaret provided choices;  1-[facility]  2-[facility]  3-[facility]  4-[facility]  (Margaret stated they do not want [facility] or [facility] due to bad experiences in the past.)  Transportation/person available to transport on day of discharge  is [facility] and have they been notified/set up TBD  Patient Goals and Preferences: TCU via [facility]  Patient anticipates discharging to:  TCU   TCU referrals sent and awaiting responses  SW to continue to follow and assist with discharge planning.

## 2019-07-17 NOTE — PROGRESS NOTES
Paged MD Vazquez to clarify admission status as [facility] recommended Observation on 7/16/19. MD Vazquez will round on patient this morning. MD Vazquez is in contact with [facility] regarding status.

## 2019-07-17 NOTE — PROGRESS NOTES
Patient was seen and examined by me this morning.  Please review my detailed progress note at a later time today.  Patient is a 87-year-old female with multiple trauma including head trauma requiring multiple staples, left arm fracture, multiple bruises and lacerations and skin avulsions, pelvic fracture and intractable pain unable to get out of bed.  Patient is getting PT, OT and  consulted for safe discharge planning.  Patient is not able to discharge or change to observation status due to continuous need for inpatient hospitalization for pain control due to multiple trauma.  Plan is to get PT and OT assessment today request pain management team to assist with pain control regimen.  We will continue to monitor patient.

## 2019-07-17 NOTE — CONSULTS
[facility]  Pain Service Consultation   Text Page    Date of Admission:  7/15/2019    Assessment & Plan   Marie Kline is a 87 year old female who was admitted on 7/15/2019. I was asked by Dr. Vazquez to see the patient for acute on chronic pain management.    1)  Acute on chronic hip and back pain s/p mechanical fall and findings of left arm fracture, pelvic fractures and occipital scalp contusion without intracranial pathology.    2)  Patient with chronic low back and left hip pain, not on chronic opioid medication.  History of tolerating tramadol.  Baseline 0mg Daily Morphine Equivalent.  Patient has no expected opioid tolerance.     Patient's opioid use in past 24 hours: tramadol 100 mg PO, dilaudid 0.4 mg IV =  18 mg Daily Morphine Equivalent    3)  Risk factors for opioid related harms  - History of Renal insufficiency  - Age > 65 years old  - Anxiety/depression    4)  Opioid induced side-effects:  -Constipation - intermittent in the past when on opiate therapy.  -Nausea/Vomit - currently feeling nauseated.  -Sedation - has experienced sedation with opiates in the past.   -Urinary Retention - none reported.    5)  Other/Related:    - Depression/anxiety - intermittent anxiety, home medication of xanax 0.25 mg bid prn.  - COPD on home oxygen - places patient at high risk for complications secondary to opiates.    PLAN:   1)  Continue IV dilaudid for severe/breakthrough pain.   2)  Oxycodone 5 mg every 4 hours prn for moderate to severe pain. (discontinue Tramadol prn)  3)Non-opioid multimodal medication therapy  -Topical: Voltaren 1% Topical Gel three times daily to bilateral hips, left arm. Lidocaine Patch 4% apply every evening to left hip and arm.  -N-SAIDS:Avoid due to recent trauma.  -Muscle Relaxants:None indicated  -Adjuvants:Acetaminophen 650 three times daily scheduled  -Antidepresants/anxiolytics: alprazolam 0.25 mg BID, patient states has significant feelings of anxiety and has history  of tolerating xanax.  4)  Non-medication interventions  Positioning, ICE, Relaxation, Distraction with visitors, TV, Essential oils per nursing, Physical therapy as ordered.  5)  Opioids: Pain not tolerable overnight and this morning.  Pain primarily in left hip and groin and left arm.  Encouraged patient to request PRN medications when due so that pain is maintained at tolerable levels.  Opioids Treatment Goal:   -Improvement in function  -Participate in PT  -Management of acute pain during hospitalization, expected prolonged need for opioids after DC due to traumatic injury.  6)  Constipation Prophylaxis   Senna-S 1-2 tablets twice daily.  7)  Pain Education  -Opioid safe use, storage and disposal information included in DC AVS  - Request pain medications on regular basis to maintain tolerable pain levels and avoid extremes in pain sensation.  8)  DC Planning   Discussed goal of Opioid therapy as above with patient and hospitalist.  Patient will require follow up at time of discharge for continued pain management.  Continued outpatient management of pain per rehabilitation facility team.  Disposition: pending.  Support systems: patients family involved.  Outpatient Referrals: none indicated.  The following risk factors have been identified for unintentional overdose: patient is > 65 years old, patient has anxiety, depression or PTSD, patient has been switched to a new opioid medication and patient has lung disease. Discharge with intra-nasal naloxone if discharged to home with opioids  >40 mg MME/day.  Plan for education prior to discharge    Time Spent on this Encounter   Total unit/floor time 45 minutes (1245 - 1330), time consisted of the following, examination of the patient, reviewing the record and completing documentation. >50% of time spent in counseling and coordination of care.  Time spend counseling with patient consisted of the following topics, goals of care and symptom management.  Time spent in  coordination of care with Bedside Nurse , and Hospitalist Dr. Vazquez.     Na SONG, CNP  Pain Management and Palliative Care  [facility]  Pgr: [phone]      Reason for Consult   Reason for consult: I was asked by Dr. Vazquez to evaluate this patient for acute on chronic pain following mechanical fall.    Primary Care Physician   Primary Care Physician:Piper Kiser  Pain Specialist: n/a    Chief Complaint   S/p mechanical fall.    History is obtained from the patient    History of Present Illness   Marie Kline is a 87 year old female with extensive medical history notable for COPD on oxygen chronically, CKD, HTN, CLL with monthly transfusions, fall and left hip fracture s/p surgical repair in 2018 who presents with mechanical fall with landing on left side.  She was walking up stairs with her dog in her arms when she fell backward striking her head and landing on her left side.  She was unable to get up and called EMS for help.  She was brought to [facility] ED where she was found to have non-displaced left humerus fracture, pelvic fractures, large skin tear to left shin and occipital scalp hematoma without intracranial bleed.  Patient was admitted for pain management and further work up for medical/surgical needs.    Patient states she has significant pain with movement.  Unable to get up or move on her own at the time of assessment.  States pain has not been controlled.  Endorses nausea and decreased appetite since fall.  Denies worsening shortness of breath, chest pain or cough.  Endorses significant anxiety, states she is concerned about what rehabilitation will look like.      CURRENT PAIN:  Her pain is located in the pelvis, left arm and abdomen.  It is described as Aching, Sharp and Shooting  She rates it as ranging between 7/10 and 10/10  The average is 9/10 on a scale of 0-10  Currently it is rated as 10/10  It improves by not moving, pain medication  It worsens by  movement.  Pain has limited participation in therapy and movement since admission to the hospital.      PAIN HISTORY:  The pain is mainly located in the left hip.  It is described as Aching and Sharp  Rates it as ranging between 4/10 and 6/10  It improves by rest  It worsens by walking or being too active.  She has been compliant with the recommendations while in the hospital.      PAST PAIN TREATMENT:   Medications: Oxycodone and Tramadol used in the past without adverse effects  Non-phamacologic modalities: ice, heat, distraction.  Previous interventions/surgeries: left hip sugery.    [place] no recent opiates prescribed on database review.    Past Medical History   I have reviewed this patient's medical history and updated it with pertinent information if needed.   Past Medical History:   Diagnosis Date     Arthritis     Generalized     Bladder cancer (H)      Bladder cancer (H)      Cardiomyopathy (H)      CLL (chronic lymphocytic leukemia) (H)      Congestive heart failure (H) 10/24/2014    flash pulm edema ass w/Takotsubo     COPD (chronic obstructive pulmonary disease) (H)     noc O2     Hypertension      Hypothyroid      Mumps      Myocardial infarction (H) 10/2014    Takotsubo presentation w/mild CAD     Pulmonary edema cardiac cause (H) 10/08/2014    associated w/Takotsubo ACS     Tricuspid valve disorders, 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    TR 2-3+ per echo       Past Surgical History   I have reviewed this patient's surgical history and updated it with pertinent information if needed.  Past Surgical History:   Procedure Laterality Date     BIOPSY LYMPH NODE INGUINAL Right 10/23/2018    Procedure: excsional biopsy right inguinal lymph node;  Surgeon: Reji Connors MD;  Location: [facility] OR     BLADDER SURGERY       CORONARY ANGIOGRAPHY ADULT ORDER  10/2014    minimal CAD     CV LEFT HEART CATH N/A 12/11/2018    Procedure: Left Heart Cath;  Surgeon: Sadiq Carrasco MD;   Location:  HEART CARDIAC CATH LAB     CYSTOSCOPY       CYSTOSCOPY, BIOPSY BLADDER, COMBINED N/A 2/9/2016    Procedure: COMBINED CYSTOSCOPY, BIOPSY BLADDER;  Surgeon: Kar Nuñez MD;  Location: [facility] OR     CYSTOSCOPY, TRANSURETHRAL RESECTION (TUR) TUMOR BLADDER, COMBINED N/A 2/9/2016    Procedure: COMBINED CYSTOSCOPY, TRANSURETHRAL RESECTION (TUR) TUMOR BLADDER;  Surgeon: Kar Nuñez MD;  Location: [facility] OR     ESOPHAGOSCOPY, GASTROSCOPY, DUODENOSCOPY (EGD), COMBINED N/A 6/22/2016    Procedure: COMBINED ESOPHAGOSCOPY, GASTROSCOPY, DUODENOSCOPY (EGD);  Surgeon: Freddie Diez MD;  Location: [facility] GI     OPEN REDUCTION INTERNAL FIXATION HIP BIPOLAR Left 11/19/2018    Procedure: Left bipolar hemiarthroplasty;  Surgeon: Scar Reynolds MD;  Location: [facility] OR         Prior to Admission Medications   Prior to Admission Medications   Prescriptions Last Dose Informant Patient Reported? Taking?   Immune Globulin, Human, (OCTAGAM IV) Past Month at Unknown time  Yes Yes   Sig: Inject 300 mg/kg into the vein every 30 days   Multiple Vitamins-Minerals (MULTIVITAMIN GUMMIES ADULT PO) 7/15/2019 at Unknown time  Yes Yes   Sig: Take 2 tablets by mouth daily    albuterol (PROAIR HFA/PROVENTIL HFA/VENTOLIN HFA) 108 (90 BASE) MCG/ACT Inhaler 7/15/2019 at Unknown time  No Yes   Sig: Inhale 2 puffs into the lungs every 6 hours as needed for wheezing   albuterol (PROVENTIL) (2.5 MG/3ML) 0.083% neb solution 7/15/2019 at Unknown time  Yes Yes   Sig: Take 2.5 mg by nebulization every 6 hours as needed for shortness of breath / dyspnea or wheezing   aspirin 81 MG EC tablet 7/15/2019 at Unknown time  Yes Yes   Sig: Take 81 mg by mouth daily   atorvastatin (LIPITOR) 20 MG tablet 7/15/2019 at Unknown time  No Yes   Sig: Take 1 tablet (20 mg) by mouth daily   budesonide-formoterol (SYMBICORT) 80-4.5 MCG/ACT Inhaler 7/15/2019 at Unknown time  Yes Yes   Sig: Inhale 2 puffs into the lungs 2 times daily   calcium carbonate-vitamin  D (OS-CARLOS) 500-400 MG-UNIT tablet 7/15/2019 at Unknown time  Yes Yes   Sig: Take 1 tablet by mouth daily   ferrous sulfate (IRON) 325 (65 FE) MG tablet 7/15/2019 at Unknown time  Yes Yes   Sig: Take 325 mg by mouth daily (with breakfast)    furosemide (LASIX) 20 MG tablet 7/15/2019 at Unknown time  No Yes   Sig: Take 1 tablet (20 mg) by mouth daily   gabapentin (NEURONTIN) 300 MG capsule 7/15/2019 at Unknown time  Yes Yes   Sig: Take 300 mg by mouth 2 times daily   levothyroxine (SYNTHROID/LEVOTHROID) 75 MCG tablet 7/14/2019 at Unknown time  No Yes   Sig: Take 1 tablet (75 mcg) by mouth daily   metoprolol succinate ER (TOPROL-XL) 25 MG 24 hr tablet 7/15/2019 at Unknown time  No Yes   Sig: Take 1 tablet (25 mg) by mouth daily   sertraline (ZOLOFT) 50 MG tablet 7/15/2019 at Unknown time  No Yes   Sig: Take 1 tablet (50 mg) by mouth daily   traMADol (ULTRAM) 50 MG tablet 7/14/2019 at Unknown time  Yes Yes   Sig: Take 50 mg by mouth every 6 hours as needed for moderate to severe pain    traZODone (DESYREL) 50 MG tablet 7/14/2019 at Unknown time  No Yes   Sig: Take 2 tablets (100 mg) by mouth At Bedtime      Facility-Administered Medications: None     Allergies   Allergies   Allergen Reactions     Diagnostic X-Ray Materials Hives     CT DYE     Contrast Dye Hives     CT DYE     Prolia [Denosumab]      Osteonecrosis jaw       Wound Dressing Adhesive        Social History   I have reviewed this patient's social history and updated it with pertinent information if needed. Marie Kline  reports that she quit smoking about 23 years ago. Her smoking use included cigarettes. She has never used smokeless tobacco. She reports that she does not drink alcohol or use drugs.    Family History   I have reviewed this patient's family history and updated it with pertinent information if needed.   Family History   Problem Relation Age of Onset     Cerebrovascular Disease Mother      Cancer Father      Family history of addiction:  no.    Review of Systems   The 10 point Review of Systems is negative other than noted in the HPI or here.    Denies Bowel or bladder dysfunction    Physical Exam   Temp:  [96.3  F (35.7  C)-98.9  F (37.2  C)] 98.3  F (36.8  C)  Pulse:  [78-79] 79  Heart Rate:  [78-85] 78  Resp:  [16-20] 20  BP: (121-158)/(57-73) 121/60  SpO2:  [92 %-98 %] 92 %  99 lbs 0 oz  GEN:  Alert, oriented x 3, appears comfortable, No apparent distress.  HEENT:  Normocephalic/atraumatic, no scleral icterus, no nasal discharge, mouth moist.  CV:  RRR, S1, S2; no murmurs or other irregularities noted.  +3 DP/PT pulses bilaterally; no edema bilateral lower extremeties.  RESP:  Clear to auscultation bilaterally without rales/rhonchi/wheezing/retractions.  Symmetric chest rise on inhalation noted.  Normal respiratory effort.  ABD:  Rounded, soft, non-tender/non-distended.  +BS  EXT:  Edema & pulses as noted above.  Color, moisture and sensation intact x 4.     M/S:   Tender to palpation over pelvis and bilateral hips, left arm, shoulder.    SKIN:  Dry to touch, no exanthems noted in the visualized areas.    NEURO: pain limited exam, strong  strength bilaterally, unable to lift left arm for further strength testing. Moves bilateral feet 5/5 strength for plantar and dorsi flexion.  Sensation to touch intact all extremities.   There is no area of allodynia or hyperesthesia.  PAIN BEHAVIOR: Cooperative  Psych:  Normal affect.  Calm, cooperative, conversant appropriately.     Data   Most Recent 3 CBC's:  Recent Labs   Lab Test 07/16/19  0733 07/15/19  1852 06/22/19  0657   .3* 136.8* 69.7*   HGB 8.8* 10.9* 9.6*   MCV 96 97 96   * 174 119*     Most Recent 3 BMP's:  Recent Labs   Lab Test 07/16/19  0733 07/15/19  1852 07/02/19  1300   * 135 141   POTASSIUM 5.8* 4.2 3.5   CHLORIDE 98 99 101   CO2 29 30 36*   BUN 19 16 9   CR 0.87 1.04 0.88   ANIONGAP 4 6 4   CARLOS 8.5 8.8 8.4*   * 128* 96

## 2019-07-17 NOTE — PLAN OF CARE
PM SHIFT  Pt alert and orientated. Pt complains of left wrist pain and buttock pain with movement. Left arm in a sling.Pt had a pelvic xray done this shift. Pt also unable to void this shift. Pt not drinking or eating much. Pt encouraged to drink and than bladder scanned at hs. Scan was greater than 300. Pt thought she was incontinent in her pad prior to this but it was not wet. Pt felt a little incontinence when she coughed. Pure wick placed and still no void. Due to pelvic fracture results and ortho has not seen pt since the xray, this staff did not get pt out of bed. Pt turned in bed every 2-3 hours. Pt taking prn ultram for pain with relief. Pt called her dgt Margaret this shift and talked to her. This staff went to straight cath pt around 2245 and pts labia are red and irritated. Pt a difficult straight cath and lots of pain in her hips when repositioned. Paged admit pager around 2255 with this text  pt required to be straight cathed again at hs. pt difficult to cath. labia red and swollen. can we place tatum overnight? also had pelvic xray this shift and has multiple fractures.    Pt did require a heavy assist of 2 to transfer back into bed at shift change today. Pt will probably need TCU on discharge. Staff to await ortho consult about pelvic xray and see how pt is doing with PT.

## 2019-07-17 NOTE — PLAN OF CARE
PT: Treatment attempted. Pt declining at current time due to fatigue, pain and reports she is leaving the floor for additional imaging. Will continue to follow per POC.

## 2019-07-17 NOTE — PLAN OF CARE
A&O, lethargic. VSS: stable, on 2 L oxygen nc. Sleeping comfortably throughout the night. Repositioned x3, painful with movement. Sling on left arm. Drsg: CDI. Anxious and would like to know if she will be having surgery or not. Waiting ortho consult.

## 2019-07-17 NOTE — PROGRESS NOTES
[facility]  Hospitalist Progress Note  Ramón Vazquez MD 07/17/2019    Reason for Stay (Diagnosis):     Fall     left proximal humerus fracture         Assessment and Plan:      Summary of Stay: Marie Kline, is an 87-year-old female with extensive past medical history including chronic obstructive pulmonary disease on oxygen, cardiomyopathy, hypertension, chronic lymphocytic leukemia, hypothyroidism who presented today to the emergency room status post mechanical fall on the stairs after she felt dizzy and sustained trauma to her left shoulder and also left leg and found to have left shoulder fracture and being admitted to the hospital for further evaluation and management.    Patient was closely monitored and orthopedic surgery consulted for evaluation of left arm fracture.  Orthopedic surgery recommended conservative measures.  Patient continues to complain of pelvic pain, buttock pain and x-ray evaluation revealed evidence of pelvic fracture.  Further CT imaging revealed bilateral para symphyseal pubic versus medial superior inferior rami fractures there is adjacent hematoma with mass-effect on the urinary bladder.  There is also right sacral alar fracture, left acetabular fracture and avulsion injury at the gluteal tendon attachment.    Pain control and mobility was difficult for this patient with multiple injuries    Problem List:   1.    Mechanical fall with multiple trauma including head trauma, upper extremity, pelvic fractures  Assessment: Patient unable to get up due to severe pain.  Requiring pain medications both tramadol and IV Dilaudid.  PT and OT consulted and assistance plan  Plan: Optimize pain control, pain management consulted to assist with this.  Continue PT, OT and discharge planning    2.  Left shoulder fracture secondary to fall: Left proximal humerus fracture  -- seen by ortho team,non operative intervention noted   -- pain control , immobilize in arm sling     3.   Extensive skin tear, left lower extremity.   -- wound care   4.  Scalp hematoma secondary to fall.   -- no intracranial acute injury.  Patient has multiple staples applied on scalp laceration.    5.  Chronic medical conditions including chronic obstructive pulmonary disease, hypothyroidism, stable.     6.  Chronic lymphocytic leukemia.  The patient has followup with Hematology/Oncology.      7.Severe hip and buttock pain during PT session -further evaluation revealed extensive pelvic fracture which happened before admission.  Orthopedic surgery was reconsulted for further recommendations.    8.  Pain secondary to multiple trauma: Pain management team was consulted and recommendation noted to continue IV Dilaudid, oxycodone every 4 hours as needed, topical medications and anxiolytics.    9.  Hyponatremia: Serum sodium was 131 yesterday and decreased to 122 today  worsening, suspect hypovolemic continue to monitor sodium, start normal saline, will check urine lites  10.hyperkalemia: Serum potassium 5.8 yesterday, better today 5.4.  Continue to monitor for now          DVT Prophylaxis: Pneumatic Compression Devices    Code Status: DNR / DNI    Discharge Dispo: TCU versus acute rehab unit    Estimated Disch Date / # of Days until Disch: Unable to determine likely in the next 2 days to acute rehab unit versus TCU if pain is controlled and patient is able to get up and work with PT        Interval History (Subjective):      Patient was seen and examined by this morning.  Please review my preliminary progress note completed this morning.  Patient remains very ill with significant pain and inability to ambulate due to pain in the pelvic and buttock region.  Further imaging studies showed extensive fractures.  Care plan was discussed with treatment team including nursing, rehabilitation team.  Patient daughter at bedside and care plan was reviewed with her as well.  Question concerns addressed.                      Physical  Exam:      Last Vital Signs:  /60   Pulse 79   Temp 98.3  F (36.8  C)   Resp 20   Wt 44.9 kg (99 lb)   SpO2 92%   BMI 20.00 kg/m        Constitutional: Awake, alert, cooperative, mild apparent distress     Respiratory: Clear to auscultation bilaterally, no crackles or wheezing   Cardiovascular: Regular rate and rhythm, normal S1 and S2, and no murmur noted   Abdomen: Normal bowel sounds, soft, non-distended, non-tender   Skin:  Stapled scalp laceration, multiple skin ulcerations including upper extremity and lower extremities   Neuro: Alert , no focal weakness, numbness,   Extremities: Limited l range of motion   Other(s):        All other systems: Negative          Medications:      All current medications were reviewed with changes reflected in problem list.         Data:      All new lab and imaging data was reviewed.      Data reviewed today: I reviewed all new labs and imaging results over the last 24 hours. I personally reviewed no images or EKG's today      Recent Labs   Lab 07/16/19  0733 07/15/19  1852   .3* 136.8*   HGB 8.8* 10.9*   HCT 29.4* 36.7   MCV 96 97   * 174     No results for input(s): CULT in the last 168 hours.  Recent Labs   Lab 07/17/19  1233 07/16/19  0733 07/15/19  1852   * 131* 135   POTASSIUM 5.4* 5.8* 4.2   CHLORIDE 90* 98 99   CO2 29 29 30   ANIONGAP 3 4 6   * 141* 128*   BUN 13 19 16   CR 0.68 0.87 1.04   GFRESTIMATED 79 60* 48*   GFRESTBLACK >90 70 56*   CARLOS 8.4* 8.5 8.8       Recent Labs   Lab 07/17/19  1233 07/16/19  0733 07/15/19  1852   * 141* 128*       No results for input(s): INR in the last 168 hours.      No results for input(s): TROPONIN, TROPI, TROPR in the last 168 hours.    Invalid input(s): TROP, TROPONINIES    Recent Results (from the past 48 hour(s))   Head CT w/o contrast    Narrative    CT SCAN OF THE HEAD WITHOUT CONTRAST   7/15/2019 8:23 PM     HISTORY: fall, L parietal/occiptal hematoma    TECHNIQUE:  Axial images of  the head and coronal reformations without  IV contrast material. Radiation dose for this scan was reduced using  automated exposure control, adjustment of the mA and/or kV according  to patient size, or iterative reconstruction technique.    COMPARISON: None.    FINDINGS:     Intracranial contents: There is diffuse parenchymal volume loss.   White matter changes are present in the cerebral hemispheres that are  consistent with small vessel ischemic disease in this age patient.  There is no evidence of intracranial hemorrhage, mass, acute infarct  or anomaly.    Visualized orbits/sinuses/mastoids:  The visualized portions of the  sinuses and mastoids appear normal.    Osseous structures/soft tissues:  There is soft tissue swelling over  the left parietal region. No intracranial hemorrhage or skull  fractures.      Impression    IMPRESSION: No intracranial hemorrhage or skull fractures are  identified.      PATRIZIA DORSEY MD   XR Hand Left G/E 3 Views    Narrative    HAND THREE VIEWS LEFT  7/15/2019 8:57 PM     HISTORY: fall, pain    COMPARISON: None.      Impression    IMPRESSION: Age-indeterminate minimally displaced fractures of the  scaphoid waist and dorsal triquetrum. Normal joint alignment  maintained. Degenerative changes throughout the hand and wrist.  Chondrocalcinosis of the triangle fibrocartilage. Osteopenia.    ALEA BEYER MD   XR Shoulder Left G/E 3 Views    Narrative    XR SHOULDER LT G/E 3 VW   7/15/2019 8:59 PM     HISTORY:  fall, pain    COMPARISON:  None      Impression    IMPRESSION: Acute comminuted fracture of the proximal left humerus.  There is a fracture line across the surgical neck with lateral  displacement of the distal fracture fragment. A nondisplaced fracture  line extends into the greater tuberosity. Normal joint alignment  maintained. Surrounding soft tissue swelling. Aortic atherosclerosis.    ALEA BEYER MD   XR Tibia & Fibula Left 2 Views    Narrative    XR TIBIA & FIBULA LT  2 VW   7/15/2019 8:59 PM     HISTORY:  fall,pain    COMPARISON:  None      Impression    IMPRESSION: No acute fracture identified. Mild degenerative changes of  the knee and ankle joints. Osteopenia.    ALEA BEYER MD         Disclaimer: This note consists of symbols derived from keyboarding, dictation and/or voice recognition software. As a result, there may be errors in the script that have gone undetected. Please consider this when interpreting information found in this chart

## 2019-07-17 NOTE — PLAN OF CARE
Afeb, vss, cms intact, very very painful in both buttocks and left groin, also left shoulder and hand. Pain team consult completed with a change from tramadol to oxycodone also the additions of some topicals. PT needed to reschedule with the new diagnosis of pelvic fractures and the need to be TTWB on left leg, awaiting the PT eval. Pt very sedated this am after being given meds and trazadone at bedtime so OT deferred to later. PT was sent down to CT for a scan of her pelvis per ortho. Dressings changed to all her skin tear. BMP done at noon shows sodium down to 122. Hospitalist notified. Daughter in and out so aware of the information, will need TCU on discharge.

## 2019-07-17 NOTE — CONSULTS
Orthopedic Surgery  Marie Kline  2019  Admit Date:  7/15/2019  Left displaced proximal humerus fracture  Bilateral pubic symphysis fractures, right inferior pubic rami fracture, left superior pubic fracture with involvement of the acetabular junction    Orthopedics was consulted due to x-ray findings revealing her pelvic fractures.  She continues to complain of fairly significant left shoulder pain and bilateral pelvic pain.  She is denying numbness or tingling in her extremities.  Unable to sit comfortably due to her pelvic pain.  Patient had a left hip bipolar prosthesis performed by Dr. Kristian Reynolds in 2018.  Alert and orient to person, place, and time.  Tolerating oral intake.    Denies nausea or vomiting  Denies chest pain or shortness of breath    Vital Sign Ranges  Temperature Temp  Av.2  F (36.2  C)  Min: 96.3  F (35.7  C)  Max: 98.9  F (37.2  C)   Blood pressure Systolic (24hrs), Av , Min:122 , Max:158        Diastolic (24hrs), Av, Min:57, Max:73      Pulse Pulse  Av.5  Min: 78  Max: 79   Respirations Resp  Av.5  Min: 16  Max: 18   Pulse oximetry SpO2  Av.4 %  Min: 92 %  Max: 98 %       Physical Exam:    Splint on LUE clean, dry and intact  Able to flex and extend fingers without difficulty  Full sensation to light touch  Radial pulse present  Capillary refill <2 seconds    Bilateral lower extremities:  Minimal erythema bilateral lower extremities. Groin Pain with hip rotation.   Bilateral calves are soft, non-tender.  Bilateral lower extremities are NVI.  Wound LLE (WOC following)  Sensation intact bilateral lower extremities  Pain with attempted hip flexion  Patient able to resist dorsi and plantar flexion bilaterally  +Dp pulse    Labs:  Recent Labs   Lab Test 19  0733 07/15/19  1852 19  1300   POTASSIUM 5.8* 4.2 3.5     Recent Labs   Lab Test 19  0733 07/15/19  1852 06/22/19  0657   HGB 8.8* 10.9* 9.6*     Recent Labs   Lab Test  08/24/18  1306 10/09/14  0350   INR 1.00 1.21*     Recent Labs   Lab Test 07/16/19  0733 07/15/19  1852 06/22/19  0657   * 174 119*     XR Pelvis:  IMPRESSION: Mildly comminuted fracture of the left pubic symphysis  with mild displacement and distraction. There is also a fracture at  the lateral aspect of the left superior pubic ramus. There is a  slightly displaced fracture of the right pubic symphysis. There is a  fracture of the adjacent right inferior pubic ramus.       1. PLAN:   Mobilize with PT/OT if tolerated   Non-WB LUE.   TTWB LLE x 6 weeks   WBAT RLE     Continue current pain regiment.   Will order CT Pelvis to further eval Acetabular involvement   Dressings: Keep sling in place for comfort.  Able to remove for hygiene/elbow ROM as tolerated   Follow-up: 2 weeks Dr Ramey.  Call [phone]    2. Disposition   Anticipate d/c to TCU when medically cleared and progressing in PT.    Yuliana Walters PA-C

## 2019-07-18 ENCOUNTER — APPOINTMENT (OUTPATIENT)
Dept: PHYSICAL THERAPY | Facility: CLINIC | Age: 84
DRG: 562 | End: 2019-07-18
Payer: MEDICARE

## 2019-07-18 LAB
ABO + RH BLD: NORMAL
ABO + RH BLD: NORMAL
ANION GAP SERPL CALCULATED.3IONS-SCNC: 3 MMOL/L (ref 3–14)
BILIRUB SERPL-MCNC: 1.3 MG/DL (ref 0.2–1.3)
BLD PROD TYP BPU: NORMAL
BLD UNIT ID BPU: 0
BLOOD PRODUCT CODE: NORMAL
BPU ID: NORMAL
BUN SERPL-MCNC: 10 MG/DL (ref 7–30)
CALCIUM SERPL-MCNC: 7.9 MG/DL (ref 8.5–10.1)
CHLORIDE SERPL-SCNC: 91 MMOL/L (ref 94–109)
CO2 SERPL-SCNC: 28 MMOL/L (ref 20–32)
CREAT SERPL-MCNC: 0.56 MG/DL (ref 0.52–1.04)
DAT IGG-SP REAG RBC-IMP: NORMAL
ERYTHROCYTE [DISTWIDTH] IN BLOOD BY AUTOMATED COUNT: 17.5 % (ref 10–15)
GFR SERPL CREATININE-BSD FRML MDRD: 84 ML/MIN/{1.73_M2}
GLUCOSE SERPL-MCNC: 103 MG/DL (ref 70–99)
HCT VFR BLD AUTO: 22.2 % (ref 35–47)
HGB BLD-MCNC: 6.6 G/DL (ref 11.7–15.7)
HGB BLD-MCNC: 6.8 G/DL (ref 11.7–15.7)
HGB BLD-MCNC: 8.5 G/DL (ref 11.7–15.7)
HGB BLD-MCNC: 8.9 G/DL (ref 11.7–15.7)
HGB BLD-MCNC: 9.1 G/DL (ref 11.7–15.7)
LDH SERPL L TO P-CCNC: 198 U/L (ref 81–234)
MCH RBC QN AUTO: 29.2 PG (ref 26.5–33)
MCHC RBC AUTO-ENTMCNC: 30.6 G/DL (ref 31.5–36.5)
MCV RBC AUTO: 95 FL (ref 78–100)
PLATELET # BLD AUTO: 119 10E9/L (ref 150–450)
POTASSIUM SERPL-SCNC: 4.7 MMOL/L (ref 3.4–5.3)
RBC # BLD AUTO: 2.33 10E12/L (ref 3.8–5.2)
RETICS # AUTO: 125.7 10E9/L (ref 25–95)
RETICS/RBC NFR AUTO: 3.7 % (ref 0.5–2)
SODIUM SERPL-SCNC: 122 MMOL/L (ref 133–144)
SODIUM SERPL-SCNC: 124 MMOL/L (ref 133–144)
SODIUM SERPL-SCNC: 126 MMOL/L (ref 133–144)
SODIUM SERPL-SCNC: 127 MMOL/L (ref 133–144)
SPECIMEN EXP DATE BLD: NORMAL
TRANSFUSION STATUS PATIENT QL: NORMAL
TRANSFUSION STATUS PATIENT QL: NORMAL
WBC # BLD AUTO: 124.6 10E9/L (ref 4–11)

## 2019-07-18 PROCEDURE — 86900 BLOOD TYPING SEROLOGIC ABO: CPT | Performed by: INTERNAL MEDICINE

## 2019-07-18 PROCEDURE — 85027 COMPLETE CBC AUTOMATED: CPT | Performed by: INTERNAL MEDICINE

## 2019-07-18 PROCEDURE — 86901 BLOOD TYPING SEROLOGIC RH(D): CPT | Performed by: INTERNAL MEDICINE

## 2019-07-18 PROCEDURE — 86880 COOMBS TEST DIRECT: CPT | Performed by: INTERNAL MEDICINE

## 2019-07-18 PROCEDURE — 83615 LACTATE (LD) (LDH) ENZYME: CPT | Performed by: INTERNAL MEDICINE

## 2019-07-18 PROCEDURE — 99233 SBSQ HOSP IP/OBS HIGH 50: CPT | Performed by: INTERNAL MEDICINE

## 2019-07-18 PROCEDURE — 97530 THERAPEUTIC ACTIVITIES: CPT | Mod: GP

## 2019-07-18 PROCEDURE — 94640 AIRWAY INHALATION TREATMENT: CPT

## 2019-07-18 PROCEDURE — 25000132 ZZH RX MED GY IP 250 OP 250 PS 637: Mod: GY | Performed by: NURSE PRACTITIONER

## 2019-07-18 PROCEDURE — 40000275 ZZH STATISTIC RCP TIME EA 10 MIN

## 2019-07-18 PROCEDURE — 83010 ASSAY OF HAPTOGLOBIN QUANT: CPT | Performed by: INTERNAL MEDICINE

## 2019-07-18 PROCEDURE — 86850 RBC ANTIBODY SCREEN: CPT | Performed by: INTERNAL MEDICINE

## 2019-07-18 PROCEDURE — 82247 BILIRUBIN TOTAL: CPT | Performed by: INTERNAL MEDICINE

## 2019-07-18 PROCEDURE — 25000132 ZZH RX MED GY IP 250 OP 250 PS 637: Mod: GY | Performed by: INTERNAL MEDICINE

## 2019-07-18 PROCEDURE — 86880 COOMBS TEST DIRECT: CPT | Performed by: NURSE PRACTITIONER

## 2019-07-18 PROCEDURE — 36415 COLL VENOUS BLD VENIPUNCTURE: CPT | Performed by: INTERNAL MEDICINE

## 2019-07-18 PROCEDURE — 86923 COMPATIBILITY TEST ELECTRIC: CPT | Performed by: INTERNAL MEDICINE

## 2019-07-18 PROCEDURE — 80048 BASIC METABOLIC PNL TOTAL CA: CPT | Performed by: INTERNAL MEDICINE

## 2019-07-18 PROCEDURE — 84295 ASSAY OF SERUM SODIUM: CPT | Performed by: INTERNAL MEDICINE

## 2019-07-18 PROCEDURE — P9016 RBC LEUKOCYTES REDUCED: HCPCS | Performed by: INTERNAL MEDICINE

## 2019-07-18 PROCEDURE — 12000000 ZZH R&B MED SURG/OB

## 2019-07-18 PROCEDURE — 85045 AUTOMATED RETICULOCYTE COUNT: CPT | Performed by: INTERNAL MEDICINE

## 2019-07-18 PROCEDURE — 85018 HEMOGLOBIN: CPT | Performed by: INTERNAL MEDICINE

## 2019-07-18 RX ORDER — ALPRAZOLAM 0.25 MG
0.25 TABLET ORAL AT BEDTIME
Status: DISCONTINUED | OUTPATIENT
Start: 2019-07-18 | End: 2019-07-24 | Stop reason: HOSPADM

## 2019-07-18 RX ORDER — ALPRAZOLAM 0.25 MG
0.25 TABLET ORAL 2 TIMES DAILY PRN
Status: DISCONTINUED | OUTPATIENT
Start: 2019-07-18 | End: 2019-07-24 | Stop reason: HOSPADM

## 2019-07-18 RX ADMIN — SENNOSIDES AND DOCUSATE SODIUM 1 TABLET: 8.6; 5 TABLET ORAL at 09:25

## 2019-07-18 RX ADMIN — ALPRAZOLAM 0.25 MG: 0.25 TABLET ORAL at 09:25

## 2019-07-18 RX ADMIN — DICLOFENAC 2 G: 10 GEL TOPICAL at 09:28

## 2019-07-18 RX ADMIN — LIDOCAINE 2 PATCH: 560 PATCH PERCUTANEOUS; TOPICAL; TRANSDERMAL at 21:36

## 2019-07-18 RX ADMIN — Medication 2.5 MG: at 21:30

## 2019-07-18 RX ADMIN — ATORVASTATIN CALCIUM 20 MG: 20 TABLET, FILM COATED ORAL at 09:24

## 2019-07-18 RX ADMIN — ALPRAZOLAM 0.25 MG: 0.25 TABLET ORAL at 21:28

## 2019-07-18 RX ADMIN — DICLOFENAC 2 G: 10 GEL TOPICAL at 18:07

## 2019-07-18 RX ADMIN — TRAZODONE HYDROCHLORIDE 50 MG: 50 TABLET ORAL at 21:28

## 2019-07-18 RX ADMIN — ACETAMINOPHEN 650 MG: 325 TABLET, FILM COATED ORAL at 05:50

## 2019-07-18 RX ADMIN — Medication 2.5 MG: at 12:17

## 2019-07-18 RX ADMIN — GABAPENTIN 300 MG: 300 CAPSULE ORAL at 21:29

## 2019-07-18 RX ADMIN — FLUTICASONE FUROATE AND VILANTEROL TRIFENATATE 1 PUFF: 100; 25 POWDER RESPIRATORY (INHALATION) at 08:15

## 2019-07-18 RX ADMIN — LEVOTHYROXINE SODIUM 75 MCG: 75 TABLET ORAL at 08:47

## 2019-07-18 RX ADMIN — GABAPENTIN 300 MG: 300 CAPSULE ORAL at 09:25

## 2019-07-18 RX ADMIN — Medication 2.5 MG: at 16:57

## 2019-07-18 RX ADMIN — OXYCODONE HYDROCHLORIDE 5 MG: 5 TABLET ORAL at 05:50

## 2019-07-18 RX ADMIN — SENNOSIDES AND DOCUSATE SODIUM 2 TABLET: 8.6; 5 TABLET ORAL at 21:30

## 2019-07-18 RX ADMIN — Medication 2.5 MG: at 15:05

## 2019-07-18 ASSESSMENT — ACTIVITIES OF DAILY LIVING (ADL)
ADLS_ACUITY_SCORE: 21
ADLS_ACUITY_SCORE: 21
ADLS_ACUITY_SCORE: 16
ADLS_ACUITY_SCORE: 20

## 2019-07-18 NOTE — PROGRESS NOTES
SPIRITUAL HEALTH SERVICES Progress Note  [facility]    [facility] visit attempt per pt follow up request.    Pt was visiting with family and declined [facility] at that time.  Pt was receiving other services.    Will f/u with pt on 07/19/19.    Plan: [facility] remains available for spiritual/emotional support.      Melanie Luna   Intern  Pager: [phone]

## 2019-07-18 NOTE — PLAN OF CARE
Discharge Planner PT   Patient plan for discharge: Per chart: TCU  Current status: Supine to sit with mod A x 2. Needs max A to scoot in a seated position. Able to sit EOB for ~ 5 minutes with intermittent CGA. Bed to chair via mechanical lift and Ax2.   Barriers to return to prior living situation: Not ambulatory, lift dependent, far below baseline for mobility, WB restrictions  Recommendations for discharge: TCU  Rationale for recommendations: Pt will need continued progress to continue to address strength, balance and activity tolerance deficits prior to returning home.        Entered by: Zakia Gonzalez 07/18/2019 3:58 PM

## 2019-07-18 NOTE — PLAN OF CARE
Lethargic, able to arouse, but drifts back to sleep  LDA: PIV infusing 1L NS, Na checks q6h  Vitals: stable, 2L/NC  Pain only w/movement, lido patches on, oxy & tylenol x1  Skin: LUE in sling, wound dressing on L shin CDI  GI/: Rees patent  Followed by Ortho, PT, OT, SW, Pain  Plan: TCU pending referrals  Will continue to monitor

## 2019-07-18 NOTE — PROGRESS NOTES
SWS      D: Discharge planning continuing... call received from admissions coordinator at [facility] informing that they would be able clinically to accept pt there as long as the Octogam IV is on hold during pt's TCU stay, this due to cost of the infusion. MD has been notified accordingly, anticipate this would be a barrier at any facility considering.       I: Spoke by phone with pt's daughter, Margaret and discussed above.. she has asked that planning continue for pt's transfer to [facility] as available as this is their facility of preference.       A/P: Will await determination of discharge date, information noted above and continue planning accordingly. Monitor bed status at [facility].

## 2019-07-18 NOTE — PROGRESS NOTES
[facility]  Pain Management Progress Note  Text Page     Assessment & Plan   Marie Kline is a 87 year old female who was admitted on 7/15/2019. I was asked by Dr. Vazquez to see the patient for acute on chronic pain management.     1)  Acute on chronic hip and back pain s/p mechanical fall and findings of left arm fracture, pelvic fractures and occipital scalp contusion without intracranial pathology.     2)  Patient with chronic low back and left hip pain, not on chronic opioid medication.  History of tolerating tramadol.  Baseline 0mg Daily Morphine Equivalent.  Patient has no expected opioid tolerance.      Patient's opioid use in past 24 hours: tramadol 50 mg PO, oxycodone 10 mg PO =  20 mg Daily Morphine Equivalent     3)  Risk factors for opioid related harms  - History of Renal insufficiency  - Age > 65 years old  - Anxiety/depression     4)  Opioid induced side-effects:  -Constipation - intermittent in the past when on opiate therapy.  -Nausea/Vomit - resolved.  -Sedation - has experienced sedation with opiates in the past.   -Urinary Retention - none reported.     5)  Other/Related:    - Depression/anxiety - intermittent anxiety, home medication of xanax 0.25 mg bid prn.  - COPD on home oxygen - places patient at high risk for complications secondary to opiates.     PLAN:   1)  Continue IV dilaudid for severe/breakthrough pain.   2)  schedule oxycodone 2.5 mg q 6 hours as patient and daughter reflect that she forgets to request things regardless of patient pain report.  3) Non-opioid multimodal medication therapy  -Topical: Voltaren 1% Topical Gel three times daily to bilateral hips, left arm. Lidocaine Patch 4% apply every evening to left hip and arm.  -N-SAIDS:Avoid due to recent trauma.  -Muscle Relaxants:None indicated  -Adjuvants:Acetaminophen 650 three times daily scheduled  -Antidepresants/anxiolytics: decrease alprazolam 0.25 mg at bedtime.  4)  Non-medication  interventions  Positioning, ICE, Relaxation, Distraction with visitors, TV, Essential oils per nursing, Physical therapy as ordered.  5)  Opioids: Pain tolerable when not moving, 10/10 severe pain with movement as minimal as repositioning.  Unable to participate in therapy due to pain.  Pain primarily in bilateral hips, groin and left arm.  Encouraged patient to request PRN medications when due so that pain is maintained at tolerable levels.  Patient with minimal pain medications yesterday and continues to endorse significant pain, does not seem to remember to request pain medications.    - oxycodone 2.5 mg q 6 hours scheduled, hold for sedation.  - oxycodone 2.5 - 5mg q 4 hours prn for severe pain.  - dilaudid 0.2 mg q 2 hours prn.  Opioids Treatment Goal:   -Improvement in function  -Participate in PT  -Management of acute pain during hospitalization, expected prolonged need for opioids after DC due to traumatic injury.  6)  Constipation Prophylaxis   Senna-S 1-2 tablets twice daily.  7)  Pain Education  -Opioid safe use, storage and disposal information included in DC AVS  - Request pain medications on regular basis to maintain tolerable pain levels and avoid extremes in pain sensation.  8)  DC Planning   Discussed goal of Opioid therapy as above with patient and hospitalist.  Patient will require follow up at time of discharge for continued pain management.  Continued outpatient management of pain per rehabilitation facility team.  Disposition: pending.  Support systems: patient's family involved.  Outpatient Referrals: none indicated.  The following risk factors have been identified for unintentional overdose: patient is > 65 years old, patient has anxiety, depression or PTSD, patient has been switched to a new opioid medication and patient has lung disease. Discharge with intra-nasal naloxone if discharged to home with opioids  >40 mg MME/day.  Plan for education prior to discharge     Na Talley  APRN, CNP  Pain Management and Palliative Care  [facility]  Pgr: [phone]    Time Spent on this Encounter   Total unit/floor time 45 minutes (1015 - 1100), time consisted of the following, examination of the patient, reviewing the record and completing documentation. >50% of time spent in counseling and coordination of care.  Time spend counseling with patient and family consisted of the following topics, symptom management.  Time spent in coordination of care with Bedside Nurse , and Hospitalist Dr. Vazquez.       Interval History   Patient with intermittent severe pain overnight.  Unable to participate in therapy sessions.  In bed.      Review of Systems    CONSTITUTIONAL: NEGATIVE for fever, chills, change in weight  ENT/MOUTH: NEGATIVE for ear, mouth and throat problems  RESP: NEGATIVE for significant cough or SOB  CV: NEGATIVE for chest pain, palpitations or peripheral edema    Physical Exam   Temp:  [96.5  F (35.8  C)-98.8  F (37.1  C)] 97.4  F (36.3  C)  Pulse:  [81-97] 81  Heart Rate:  [78-84] 83  Resp:  [18-20] 18  BP: (112-147)/(52-70) 112/53  SpO2:  [91 %-99 %] 96 %  99 lbs 0 oz  GEN:  Alert, oriented x 3, appears comfortable, NAD.  HEENT:  Normocephalic/atraumatic, no scleral icterus, no nasal discharge, mouth moist.  CV:  RRR, S1, S2; no murmurs or other irregularities noted.  +3 DP/PT pulses bilatererally; no edema BLE.  RESP:  Clear to auscultation bilaterally without rales/rhonchi/wheezing/retractions.  Symmetric chest rise on inhalation noted.  Normal respiratory effort.  ABD:  Rounded, soft, non-tender/non-distended.  +BS  EXT:  Edema & pulses as noted above.  CMS intact x 4.     M/S:   n/a.    SKIN:  Dry to touch, no exanthems noted in the visualized areas.    NEURO: Symmetric strength +5/5.  Sensation to touch intact all extremities.   There is no area of allodynia or hyperesthesia.  PAIN BEHAVIOR: Cooperative  Psych:  Normal affect.  Calm, cooperative, conversant  appropriately.    Medications     - MEDICATION INSTRUCTIONS -       sodium chloride 1,000 mL (07/17/19 1620)       ALPRAZolam  0.25 mg Oral At Bedtime     atorvastatin  20 mg Oral Daily     diclofenac  2-4 g Transdermal TID     fluticasone-vilanterol  1 puff Inhalation Daily     gabapentin  300 mg Oral BID     levothyroxine  75 mcg Oral Daily     lidocaine  2 patch Transdermal Q24h    And     lidocaine   Transdermal Q24H    And     lidocaine   Transdermal Q8H     oxyCODONE  2.5 mg Oral Q6H     senna-docusate  1 tablet Oral BID    Or     senna-docusate  2 tablet Oral BID     sodium chloride (PF)  3 mL Intracatheter Q8H     traZODone  100 mg Oral At Bedtime       Data   Recent Labs   Lab 07/18/19  1159 07/18/19  0734 07/18/19  0627 07/17/19  2344  07/17/19  1233 07/16/19  0733 07/15/19  6492   WBC  --   --  124.6*  --   --   --  142.3* 136.8*   HGB  --  6.6* 6.8*  --   --   --  8.8* 10.9*   MCV  --   --  95  --   --   --  96 97   PLT  --   --  119*  --   --   --  142* 174   *  --  122* 123*   < > 122* 131* 135   POTASSIUM  --   --  4.7  --   --  5.4* 5.8* 4.2   CHLORIDE  --   --  91*  --   --  90* 98 99   CO2  --   --  28  --   --  29 29 30   BUN  --   --  10  --   --  13 19 16   CR  --   --  0.56  --   --  0.68 0.87 1.04   ANIONGAP  --   --  3  --   --  3 4 6   CARLOS  --   --  7.9*  --   --  8.4* 8.5 8.8   GLC  --   --  103*  --   --  114* 141* 128*    < > = values in this interval not displayed.

## 2019-07-18 NOTE — PLAN OF CARE
Afeb,vss, sats >92% on 2L NC but down in the 80's on r/a. Cms intact to legs, left hand is bruised and swollen with decent ROM except for the swelling. Hgb 6.8 this am, recheck was  6.6 so 1 unit of RBCs infused. Post transfusion hgb was 9.1.  Hem/onc consult completed and labs orders. Rees with good urine output.  Wound care done on all the skin tears, they look clean. PT and nursing got pt up with sling to recliner but it was too painful and she had to return to the bed. Pain team saw and scheduled 2.5mg of oxycodone every 6 hours with prn doses between as needed. More pain this pm with the dressing changes and the moving. Pt being set up for TCU tomorrow.

## 2019-07-18 NOTE — CONSULTS
[facility] Consultation    Marie Kline MRN# [id number]   YOB: 1932 Age: 87 year old   Date of Admission: 7/15/2019  Requesting physician: Dr. Vazquez  Reason for consult: CLL, anemia      Primary Oncologist: Dr. Isabel Ivory         Assessment and Plan:   1. CLL: The patient was diagnosed with CLL in April 2007, and has been receiving monthly IVIG therapy only for associated hypogammaglobulinemia  (last on 6/26/19).   Her white count has been elevated and fluctuating since diagnosis.  Received 1 dose of IV rituximab in the hospital, March 23, 2017, this was done for white count of 200-300 range there was concern of pseudohyperkalemia also.  - WBC over the past few months have been upward trending, but the patient has been hospitalized on at least 2 occasions for treatment of infection during this time, making that trend difficult to interpret.    - at this point, there is no indication that she is clearly progressing from CLL.  We will monitor her labs closely and reassess on an outpatient basis, once her current acute issues are resolved.   - can delay monthly IVIG (due next week) and give in clinic once recovered from current hospitalization     2. Anemia, with drop in hemoglobin from 10.9 on 7/15/19 to 6.6 today. This decline could be due to trauma/fractures vs acute illness vs hemodilution from IVF. However, with her CLL, will check a hemolysis panel to rule out AIHA.  She has received a unit of PRBC today for hgb 6.6.     Discussed with patient and her daughter Margaret.    Discussed with Dr. Mihai Malin, APRN, CNP  [facility]  [phone]             Chief Complaint:   Marie has been hospitalized on at least a few occasions over the past few months.  In May she was treated for a COPD exacerbation and herpes labialis.  It appears that in June of this year she was treated for pneumonia.  She is currently hospitalized after falling earlier this week.    The patient  tells me that prior to her fall on Monday, she was doing well.  She was living independently in a senior community and had gone out to lunch with friends.  She fell after tripping and has now fractured several bones, sustained head trauma requiring staples and a laceration on her left leg.  She is hospitalized for management of these fractures and for control of pain.    Imaging has shown a left shoulder fracture as well as bilateral para symphyseal pubic versus medial superior inferior rami fractures there is adjacent hematoma with mass-effect on the urinary bladder.  There is also right sacral alar fracture, left acetabular fracture and avulsion injury at the gluteal tendon attachment.    She reports (and her daughter concurs) that she was doing quite well before this fall.  She was eating and drinking OK.  No recent fevers. No night sweats or weight loss.       Oncologic history   1. CLL: The patient was diagnosed with CLL in April 2007, and has been receiving monthly IVIG therapy only. Her white count has been high from the start.  Received 1 dose of IV rituximab in the hospital, March 23, 2017, this was done for white count of 200-300 range there was concern of pseudohyperkalemia also.     -October 2018: Lymph node biopsy performed to confirm CLL and no evidence of transformation this was of right inguinal lymph node.     WBCs have fluctuated over the years.  For example, WBC was 165K in Oct 2018 then 45K in March 2019. In April 2019, WBC was 77K and 172K in June 2019. Today it is 124K    Most recent hemoglobin in our office was 10.5 on 6/26/19.     2.  Bladder Cancer:  Hss been followed by Dr. Nuñez with intravesicular BCG and frequent follow up cystoscopies.  No obvious disease recurrence.          Physical Exam:   Vitals were reviewed  Blood pressure 125/65, pulse 81, temperature 97.9  F (36.6  C), resp. rate 16, weight 44.9 kg (99 lb), SpO2 94 %, not currently breastfeeding.  Temperatures:  Current - Temp:  97.9  F (36.6  C); Max - Temp  Av.4  F (36.3  C)  Min: 96  F (35.6  C)  Max: 98.8  F (37.1  C)  Respiration range: Resp  Av.7  Min: 16  Max: 19  Pulse range: Pulse  Av.3  Min: 81  Max: 97  Blood pressure range: Systolic (24hrs), Av , Min:112 , Max:147   ; Diastolic (24hrs), Av, Min:52, Max:70    Pulse oximetry range: SpO2  Av %  Min: 91 %  Max: 99 %    Intake/Output Summary (Last 24 hours) at 2019 1337  Last data filed at 2019 1200  Gross per 24 hour   Intake 1740 ml   Output 1650 ml   Net 90 ml       GENERAL: painful.   SKIN: No rashes or jaundice.  Laceration on left leg, bandaged.  HEENT: Normocephalic, atraumatic. Eyes anicteric. Oropharynx is clear.  LYMPH: No palpable lymphadenopathy in the cervical, supraclavicular, axillary, or inguinal regions.  HEART: Regular rate and rhythm with no murmurs.  LUNGS: Clear bilaterally.  ABDOMEN: Soft, nontender, nondistended with no palpable hepatosplenomegaly.  EXTREMITIES: No clubbing, cyanosis, or edema.  Left arm in sling.  MUSCULOSKELETAL: No pain to percussion over entire spine.  MENTAL: Alert and oriented to person, place, and time.  NEURO: Cranial nerves II through XII grossly intact with no focal motor or sensory deficits.            Past Medical History:   I have reviewed this patient's past medical history  Past Medical History:   Diagnosis Date     Arthritis     Generalized     Bladder cancer (H)      Bladder cancer (H)      Cardiomyopathy (H)      CLL (chronic lymphocytic leukemia) (H)      Congestive heart failure (H) 10/24/2014    flash pulm edema ass w/Takotsubo     COPD (chronic obstructive pulmonary disease) (H)     noc O2     Hypertension      Hypothyroid      Mumps      Myocardial infarction (H) 10/2014    Takotsubo presentation w/mild CAD     Pulmonary edema cardiac cause (H) 10/08/2014    associated w/Takotsubo ACS     Tricuspid valve disorders, 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    TR 2-3+ per echo              Past Surgical History:   I have reviewed this patient's past surgical history  Past Surgical History:   Procedure Laterality Date     BIOPSY LYMPH NODE INGUINAL Right 10/23/2018    Procedure: excsional biopsy right inguinal lymph node;  Surgeon: Reji Connors MD;  Location: [facility] OR     BLADDER SURGERY       CORONARY ANGIOGRAPHY ADULT ORDER  10/2014    minimal CAD     CV LEFT HEART CATH N/A 2018    Procedure: Left Heart Cath;  Surgeon: Sadiq Carrasco MD;  Location:  HEART CARDIAC CATH LAB     CYSTOSCOPY       CYSTOSCOPY, BIOPSY BLADDER, COMBINED N/A 2016    Procedure: COMBINED CYSTOSCOPY, BIOPSY BLADDER;  Surgeon: Kar Nuñez MD;  Location:  OR     CYSTOSCOPY, TRANSURETHRAL RESECTION (TUR) TUMOR BLADDER, COMBINED N/A 2016    Procedure: COMBINED CYSTOSCOPY, TRANSURETHRAL RESECTION (TUR) TUMOR BLADDER;  Surgeon: Kar Nuñez MD;  Location:  OR     ESOPHAGOSCOPY, GASTROSCOPY, DUODENOSCOPY (EGD), COMBINED N/A 2016    Procedure: COMBINED ESOPHAGOSCOPY, GASTROSCOPY, DUODENOSCOPY (EGD);  Surgeon: Freddie Diez MD;  Location:  GI     OPEN REDUCTION INTERNAL FIXATION HIP BIPOLAR Left 2018    Procedure: Left bipolar hemiarthroplasty;  Surgeon: Scar Reynolds MD;  Location:  OR               Social History:   I have reviewed this patient's social history  Social History     Tobacco Use     Smoking status: Former Smoker     Types: Cigarettes     Last attempt to quit: 2/3/1996     Years since quittin.4     Smokeless tobacco: Never Used   Substance Use Topics     Alcohol use: No     Alcohol/week: 0.0 oz             Family History:   I have reviewed this patient's family history  Family History   Problem Relation Age of Onset     Cerebrovascular Disease Mother      Cancer Father              Allergies:     Allergies   Allergen Reactions     Diagnostic X-Ray Materials Hives     CT DYE     Contrast Dye Hives     CT DYE     Prolia [Denosumab]       Osteonecrosis jaw       Wound Dressing Adhesive              Medications:   I have reviewed this patient's current medications  Medications Prior to Admission   Medication Sig Dispense Refill Last Dose     albuterol (PROAIR HFA/PROVENTIL HFA/VENTOLIN HFA) 108 (90 BASE) MCG/ACT Inhaler Inhale 2 puffs into the lungs every 6 hours as needed for wheezing 1 Inhaler 8 7/15/2019 at Unknown time     albuterol (PROVENTIL) (2.5 MG/3ML) 0.083% neb solution Take 2.5 mg by nebulization every 6 hours as needed for shortness of breath / dyspnea or wheezing   7/15/2019 at Unknown time     aspirin 81 MG EC tablet Take 81 mg by mouth daily   7/15/2019 at Unknown time     atorvastatin (LIPITOR) 20 MG tablet Take 1 tablet (20 mg) by mouth daily 90 tablet 4 7/15/2019 at Unknown time     budesonide-formoterol (SYMBICORT) 80-4.5 MCG/ACT Inhaler Inhale 2 puffs into the lungs 2 times daily   7/15/2019 at Unknown time     calcium carbonate-vitamin D (OS-CARLOS) 500-400 MG-UNIT tablet Take 1 tablet by mouth daily   7/15/2019 at Unknown time     ferrous sulfate (IRON) 325 (65 FE) MG tablet Take 325 mg by mouth daily (with breakfast)    7/15/2019 at Unknown time     furosemide (LASIX) 20 MG tablet Take 1 tablet (20 mg) by mouth daily 30 tablet 0 7/15/2019 at Unknown time     gabapentin (NEURONTIN) 300 MG capsule Take 300 mg by mouth 2 times daily   7/15/2019 at Unknown time     Immune Globulin, Human, (OCTAGAM IV) Inject 300 mg/kg into the vein every 30 days   Past Month at Unknown time     levothyroxine (SYNTHROID/LEVOTHROID) 75 MCG tablet Take 1 tablet (75 mcg) by mouth daily 90 tablet 4 7/14/2019 at Unknown time     metoprolol succinate ER (TOPROL-XL) 25 MG 24 hr tablet Take 1 tablet (25 mg) by mouth daily 90 tablet 4 7/15/2019 at Unknown time     Multiple Vitamins-Minerals (MULTIVITAMIN GUMMIES ADULT PO) Take 2 tablets by mouth daily    7/15/2019 at Unknown time     sertraline (ZOLOFT) 50 MG tablet Take 1 tablet (50 mg) by mouth daily 90  tablet 1 7/15/2019 at Unknown time     traMADol (ULTRAM) 50 MG tablet Take 50 mg by mouth every 6 hours as needed for moderate to severe pain    7/14/2019 at Unknown time     traZODone (DESYREL) 50 MG tablet Take 2 tablets (100 mg) by mouth At Bedtime 90 tablet 0 7/14/2019 at Unknown time     Current Facility-Administered Medications Ordered in Epic   Medication Dose Route Frequency Last Rate Last Dose     acetaminophen (TYLENOL) tablet 650 mg  650 mg Oral Q4H PRN   650 mg at 07/18/19 0550     albuterol (PROAIR HFA/PROVENTIL HFA/VENTOLIN HFA) 108 (90 Base) MCG/ACT inhaler 2 puff  2 puff Inhalation Q6H PRN         albuterol (PROVENTIL) neb solution 2.5 mg  2.5 mg Nebulization Q6H PRN   2.5 mg at 07/17/19 0825     ALPRAZolam (XANAX) tablet 0.25 mg  0.25 mg Oral At Bedtime         ALPRAZolam (XANAX) tablet 0.25 mg  0.25 mg Oral BID PRN         atorvastatin (LIPITOR) tablet 20 mg  20 mg Oral Daily   20 mg at 07/18/19 0924     Contraindications to both pharmacological and mechanical prophylaxis (must document contraindications for both in this order)   Does not apply Continuous PRN         diclofenac (VOLTAREN) 1 % topical gel 2-4 g  2-4 g Transdermal TID   2 g at 07/18/19 0928     fluticasone-vilanterol (BREO ELLIPTA) 100-25 MCG/INH inhaler 1 puff  1 puff Inhalation Daily   1 puff at 07/18/19 0815     gabapentin (NEURONTIN) capsule 300 mg  300 mg Oral BID   300 mg at 07/18/19 0925     HYDROmorphone (PF) (DILAUDID) injection 0.2 mg  0.2 mg Intravenous Q2H PRN   0.2 mg at 07/16/19 1201     levothyroxine (SYNTHROID/LEVOTHROID) tablet 75 mcg  75 mcg Oral Daily   75 mcg at 07/18/19 0847     Lidocaine (LIDOCARE) 4 % Patch 2 patch  2 patch Transdermal Q24h   2 patch at 07/17/19 1942    And     lidocaine patch REMOVAL   Transdermal Q24H        And     lidocaine patch in PLACE   Transdermal Q8H         lidocaine (LMX4) cream   Topical Q1H PRN         lidocaine 1 % 0.1-1 mL  0.1-1 mL Other Q1H PRN         melatonin tablet 1 mg  1  mg Oral At Bedtime PRN         naloxone (NARCAN) injection 0.1-0.4 mg  0.1-0.4 mg Intravenous Q2 Min PRN         ondansetron (ZOFRAN-ODT) ODT tab 4 mg  4 mg Oral Q6H PRN        Or     ondansetron (ZOFRAN) injection 4 mg  4 mg Intravenous Q6H PRN   4 mg at 07/16/19 0134     oxyCODONE IR (ROXICODONE) half-tab 2.5 mg  2.5 mg Oral Q6H   2.5 mg at 07/18/19 1217     oxyCODONE IR (ROXICODONE) half-tab 2.5-5 mg  2.5-5 mg Oral Q4H PRN         polyethylene glycol (MIRALAX/GLYCOLAX) Packet 17 g  17 g Oral Daily PRN         prochlorperazine (COMPAZINE) injection 5 mg  5 mg Intravenous Q6H PRN        Or     prochlorperazine (COMPAZINE) tablet 5 mg  5 mg Oral Q6H PRN        Or     prochlorperazine (COMPAZINE) Suppository 12.5 mg  12.5 mg Rectal Q12H PRN         senna-docusate (SENOKOT-S/PERICOLACE) 8.6-50 MG per tablet 1 tablet  1 tablet Oral BID   1 tablet at 07/18/19 0925    Or     senna-docusate (SENOKOT-S/PERICOLACE) 8.6-50 MG per tablet 2 tablet  2 tablet Oral BID         sodium chloride (PF) 0.9% PF flush 3 mL  3 mL Intracatheter q1 min prn         sodium chloride (PF) 0.9% PF flush 3 mL  3 mL Intracatheter Q8H   3 mL at 07/17/19 1625     sodium chloride 0.9% infusion  1,000 mL Intravenous Continuous 75 mL/hr at 07/17/19 1620 1,000 mL at 07/17/19 1620     traZODone (DESYREL) tablet 100 mg  100 mg Oral At Bedtime   50 mg at 07/17/19 2244     No current Epic-ordered outpatient medications on file.             Review of Systems:   The 10 point Review of Systems is negative other than noted in the HPI.            Data:   Data   Results for orders placed or performed during the hospital encounter of 07/15/19 (from the past 24 hour(s))   Sodium   Result Value Ref Range    Sodium 123 (L) 133 - 144 mmol/L   Sodium random urine   Result Value Ref Range    Sodium Urine mmol/L 21 mmol/L   Osmolality urine   Result Value Ref Range    Urine Osmolality 521 100 - 1,200 mmol/kg   Sodium   Result Value Ref Range    Sodium 123 (L) 133 - 144  mmol/L   CBC with platelets   Result Value Ref Range    .6 (HH) 4.0 - 11.0 10e9/L    RBC Count 2.33 (L) 3.8 - 5.2 10e12/L    Hemoglobin 6.8 (LL) 11.7 - 15.7 g/dL    Hematocrit 22.2 (L) 35.0 - 47.0 %    MCV 95 78 - 100 fl    MCH 29.2 26.5 - 33.0 pg    MCHC 30.6 (L) 31.5 - 36.5 g/dL    RDW 17.5 (H) 10.0 - 15.0 %    Platelet Count 119 (L) 150 - 450 10e9/L   Basic metabolic panel   Result Value Ref Range    Sodium 122 (L) 133 - 144 mmol/L    Potassium 4.7 3.4 - 5.3 mmol/L    Chloride 91 (L) 94 - 109 mmol/L    Carbon Dioxide 28 20 - 32 mmol/L    Anion Gap 3 3 - 14 mmol/L    Glucose 103 (H) 70 - 99 mg/dL    Urea Nitrogen 10 7 - 30 mg/dL    Creatinine 0.56 0.52 - 1.04 mg/dL    GFR Estimate 84 >60 mL/min/[1.73_m2]    GFR Estimate If Black >90 >60 mL/min/[1.73_m2]    Calcium 7.9 (L) 8.5 - 10.1 mg/dL   Hemoglobin   Result Value Ref Range    Hemoglobin 6.6 (LL) 11.7 - 15.7 g/dL   ABO and Rh   Result Value Ref Range    ABO A     RH(D) Neg     Specimen Expires 07/21/2019    ABO/Rh type and screen   Result Value Ref Range    Units Ordered 1     ABO A     RH(D) Neg     Antibody Screen Neg     Test Valid Only At [facility]        Specimen Expires 07/21/2019     Crossmatch Red Blood Cells    Blood component   Result Value Ref Range    Unit Number [id number]     Blood Component Type Red Blood Cells Leukocyte Reduced     Division Number 00     Status of Unit Released to care unit 07/18/2019 0925     Blood Product Code [id number]     Unit Status ISS    Sodium   Result Value Ref Range    Sodium 127 (L) 133 - 144 mmol/L

## 2019-07-18 NOTE — PROGRESS NOTES
[facility]  Hospitalist Progress Note  Ramón Vazquez MD 07/18/2019    Reason for Stay (Diagnosis):     Fall     left proximal humerus fracture         Assessment and Plan:      Summary of Stay: Marie Kline, is an 87-year-old female with extensive past medical history including chronic obstructive pulmonary disease on oxygen, cardiomyopathy, hypertension, chronic lymphocytic leukemia, hypothyroidism who presented today to the emergency room status post mechanical fall on the stairs after she felt dizzy and sustained trauma to her left shoulder and also left leg and found to have left shoulder fracture and being admitted to the hospital for further evaluation and management.    Patient was closely monitored and orthopedic surgery consulted for evaluation of left arm fracture.  Orthopedic surgery recommended conservative measures.  Patient continues to complain of pelvic pain, buttock pain and x-ray evaluation revealed evidence of pelvic fracture.  Further CT imaging revealed bilateral para symphyseal pubic versus medial superior inferior rami fractures there is adjacent hematoma with mass-effect on the urinary bladder.  There is also right sacral alar fracture, left acetabular fracture and avulsion injury at the gluteal tendon attachment.    Pain control and mobility was difficult for this patient with multiple injuries    Problem List:   1.    Mechanical fall with multiple trauma including head trauma, upper extremity, pelvic fractures  Assessment:pain control ok at rest but still painful with mobility   Plan: Optimize pain control, pain management consulted to assist with this.  Continue PT, OT and discharge planning    2.  Left shoulder fracture secondary to fall: Left proximal humerus fracture  -- seen by ortho team,non operative intervention noted   -- pain control , immobilize in arm sling     3.  Extensive skin tear, left lower extremity.   -- wound care   4.  Scalp hematoma secondary to  fall.   -- no intracranial acute injury.  Patient has multiple staples applied on scalp laceration.    5.  Chronic medical conditions including chronic obstructive pulmonary disease, hypothyroidism, stable.     6.  Chronic lymphocytic leukemia.  The patient has followup with Hematology/Oncology.      7.Severe hip and buttock pain during PT session -further evaluation revealed extensive pelvic fracture which happened before admission.  Orthopedic surgery was reconsulted for further recommendations.  -- no surgery plan   8.  Pain secondary to multiple trauma: Pain management team was consulted and recommendation noted to continue IV Dilaudid, oxycodone every 4 hours as needed, topical medications and anxiolytics.    9.  Hyponatremia: suspect hypovolemic continue to monitor sodium,  normal saline. Urine studies not consistent with SIADH except Uosm  10.Hyperkalemia: improved to normal range   Continue to monitor for now  11.Severe anemia - suspect acute on chronic anemia due to pelvic fracture and hematoma   - a unit of blood transfusion and check hgb if she needs additional blood   --Dr Ivory team consulted  For recommendation in setting of chronic leukemia         DVT Prophylaxis: Pneumatic Compression Devices    Code Status: DNR / DNI    Discharge Dispo: TCU versus acute rehab unit    Estimated Disch Date / # of Days until Disch: Unable to determine likely in the next 2 days to acute rehab unit versus TCU if pain is controlled and patient is able to get up and work with PT        Interval History (Subjective):      Patient is seen and examined by me today and medical record reviewed.Overnight events noted and care discussed with nursing staff.  Feels weak   No pain without movement                   Physical Exam:      Last Vital Signs:  /65   Pulse 81   Temp 97.9  F (36.6  C)   Resp 16   Wt 44.9 kg (99 lb)   SpO2 94%   BMI 20.00 kg/m        Constitutional: Awake, alert, cooperative, mild apparent  distress     Respiratory: Clear to auscultation bilaterally, no crackles or wheezing   Cardiovascular: Regular rate and rhythm, normal S1 and S2, and no murmur noted   Abdomen: Normal bowel sounds, soft, non-distended, non-tender   Skin:  Stapled scalp laceration, multiple skin ulcerations including upper extremity and lower extremities   Neuro: Alert , no focal weakness, numbness,   Extremities: Limited l range of motion   Other(s):        All other systems: Negative          Medications:      All current medications were reviewed with changes reflected in problem list.         Data:      All new lab and imaging data was reviewed.      Data reviewed today: I reviewed all new labs and imaging results over the last 24 hours. I personally reviewed no images or EKG's today      Recent Labs   Lab 07/18/19  0734 07/18/19 0627 07/16/19  0733 07/15/19  1852   WBC  --  124.6* 142.3* 136.8*   HGB 6.6* 6.8* 8.8* 10.9*   HCT  --  22.2* 29.4* 36.7   MCV  --  95 96 97   PLT  --  119* 142* 174     No results for input(s): CULT in the last 168 hours.  Recent Labs   Lab 07/18/19  1159 07/18/19  0627 07/17/19  2344  07/17/19  1233 07/16/19  0733   * 122* 123*   < > 122* 131*   POTASSIUM  --  4.7  --   --  5.4* 5.8*   CHLORIDE  --  91*  --   --  90* 98   CO2  --  28  --   --  29 29   ANIONGAP  --  3  --   --  3 4   GLC  --  103*  --   --  114* 141*   BUN  --  10  --   --  13 19   CR  --  0.56  --   --  0.68 0.87   GFRESTIMATED  --  84  --   --  79 60*   GFRESTBLACK  --  >90  --   --  >90 70   CARLOS  --  7.9*  --   --  8.4* 8.5    < > = values in this interval not displayed.       Recent Labs   Lab 07/18/19  0627 07/17/19  1233 07/16/19  0733 07/15/19  1852   * 114* 141* 128*       No results for input(s): INR in the last 168 hours.      No results for input(s): TROPONIN, TROPI, TROPR in the last 168 hours.    Invalid input(s): TROP, TROPONINIES    Recent Results (from the past 48 hour(s))   Head CT w/o contrast     Narrative    CT SCAN OF THE HEAD WITHOUT CONTRAST   7/15/2019 8:23 PM     HISTORY: fall, L parietal/occiptal hematoma    TECHNIQUE:  Axial images of the head and coronal reformations without  IV contrast material. Radiation dose for this scan was reduced using  automated exposure control, adjustment of the mA and/or kV according  to patient size, or iterative reconstruction technique.    COMPARISON: None.    FINDINGS:     Intracranial contents: There is diffuse parenchymal volume loss.   White matter changes are present in the cerebral hemispheres that are  consistent with small vessel ischemic disease in this age patient.  There is no evidence of intracranial hemorrhage, mass, acute infarct  or anomaly.    Visualized orbits/sinuses/mastoids:  The visualized portions of the  sinuses and mastoids appear normal.    Osseous structures/soft tissues:  There is soft tissue swelling over  the left parietal region. No intracranial hemorrhage or skull  fractures.      Impression    IMPRESSION: No intracranial hemorrhage or skull fractures are  identified.      PATRIZIA DORSEY MD   XR Hand Left G/E 3 Views    Narrative    HAND THREE VIEWS LEFT  7/15/2019 8:57 PM     HISTORY: fall, pain    COMPARISON: None.      Impression    IMPRESSION: Age-indeterminate minimally displaced fractures of the  scaphoid waist and dorsal triquetrum. Normal joint alignment  maintained. Degenerative changes throughout the hand and wrist.  Chondrocalcinosis of the triangle fibrocartilage. Osteopenia.    ALEA BEYER MD   XR Shoulder Left G/E 3 Views    Narrative    XR SHOULDER LT G/E 3 VW   7/15/2019 8:59 PM     HISTORY:  fall, pain    COMPARISON:  None      Impression    IMPRESSION: Acute comminuted fracture of the proximal left humerus.  There is a fracture line across the surgical neck with lateral  displacement of the distal fracture fragment. A nondisplaced fracture  line extends into the greater tuberosity. Normal joint alignment  maintained.  Surrounding soft tissue swelling. Aortic atherosclerosis.    ALEA BEYER MD   XR Tibia & Fibula Left 2 Views    Narrative    XR TIBIA & FIBULA LT 2 VW   7/15/2019 8:59 PM     HISTORY:  fall,pain    COMPARISON:  None      Impression    IMPRESSION: No acute fracture identified. Mild degenerative changes of  the knee and ankle joints. Osteopenia.    ALEA BEYER MD         Disclaimer: This note consists of symbols derived from keyboarding, dictation and/or voice recognition software. As a result, there may be errors in the script that have gone undetected. Please consider this when interpreting information found in this chart

## 2019-07-18 NOTE — PLAN OF CARE
Pt A/O x4 - very sleepy throughout shift in between cares.  VSS and afebrile.  Oxygen saturation in mid 90's on 2L via nasal cannula.  Lung sounds diminished with frequent, productive cough.  Pain managed adequately with scheduled Lidocaine patches and PRN PO Oxycodone.  CMS intact.  Dressings CDI.  Pt status is non-weight bearing on left arm, toe touch left leg, and as tolerated on right leg - pt not out of bed this shift due to pain even with movement.  Voiding adequately.  Bowel sounds hypoactive and pt reports not passing flatus yet.  Tolerating regular diet well.  Continuing to monitor sodium labs and NS at 75 ml/hr was initiated.  Plan is TCU on discharge.  Will continue to monitor.

## 2019-07-19 ENCOUNTER — APPOINTMENT (OUTPATIENT)
Dept: GENERAL RADIOLOGY | Facility: CLINIC | Age: 84
DRG: 562 | End: 2019-07-19
Attending: INTERNAL MEDICINE
Payer: MEDICARE

## 2019-07-19 LAB
ALBUMIN UR-MCNC: 100 MG/DL
ANION GAP SERPL CALCULATED.3IONS-SCNC: 4 MMOL/L (ref 3–14)
ANISOCYTOSIS BLD QL SMEAR: ABNORMAL
APPEARANCE UR: CLEAR
BASOPHILS # BLD AUTO: 0 10E9/L (ref 0–0.2)
BASOPHILS NFR BLD AUTO: 0 %
BILIRUB UR QL STRIP: NEGATIVE
BUN SERPL-MCNC: 7 MG/DL (ref 7–30)
CALCIUM SERPL-MCNC: 8.5 MG/DL (ref 8.5–10.1)
CHLORIDE SERPL-SCNC: 91 MMOL/L (ref 94–109)
CO2 SERPL-SCNC: 28 MMOL/L (ref 20–32)
COLOR UR AUTO: ABNORMAL
CREAT SERPL-MCNC: 0.54 MG/DL (ref 0.52–1.04)
DIFFERENTIAL METHOD BLD: ABNORMAL
EOSINOPHIL # BLD AUTO: 0 10E9/L (ref 0–0.7)
EOSINOPHIL NFR BLD AUTO: 0 %
ERYTHROCYTE [DISTWIDTH] IN BLOOD BY AUTOMATED COUNT: 20.6 % (ref 10–15)
GFR SERPL CREATININE-BSD FRML MDRD: 85 ML/MIN/{1.73_M2}
GLUCOSE SERPL-MCNC: 122 MG/DL (ref 70–99)
GLUCOSE UR STRIP-MCNC: NEGATIVE MG/DL
HAPTOGLOB SERPL-MCNC: 177 MG/DL (ref 35–175)
HCT VFR BLD AUTO: 29.7 % (ref 35–47)
HGB BLD-MCNC: 8.7 G/DL (ref 11.7–15.7)
HGB UR QL STRIP: NEGATIVE
KETONES UR STRIP-MCNC: NEGATIVE MG/DL
LACTATE BLD-SCNC: 0.5 MMOL/L (ref 0.7–2)
LEUKOCYTE ESTERASE UR QL STRIP: NEGATIVE
LYMPHOCYTES # BLD AUTO: 150.7 10E9/L (ref 0.8–5.3)
LYMPHOCYTES NFR BLD AUTO: 99 %
MACROCYTES BLD QL SMEAR: PRESENT
MCH RBC QN AUTO: 26 PG (ref 26.5–33)
MCHC RBC AUTO-ENTMCNC: 29.3 G/DL (ref 31.5–36.5)
MCV RBC AUTO: 89 FL (ref 78–100)
MICROCYTES BLD QL SMEAR: PRESENT
MONOCYTES # BLD AUTO: 0 10E9/L (ref 0–1.3)
MONOCYTES NFR BLD AUTO: 0 %
NEUTROPHILS # BLD AUTO: 1.5 10E9/L (ref 1.6–8.3)
NEUTROPHILS NFR BLD AUTO: 1 %
NITRATE UR QL: NEGATIVE
PH UR STRIP: 6.5 PH (ref 5–7)
PLATELET # BLD AUTO: 146 10E9/L (ref 150–450)
PLATELET # BLD EST: ABNORMAL 10*3/UL
POTASSIUM SERPL-SCNC: 4.9 MMOL/L (ref 3.4–5.3)
RBC # BLD AUTO: 3.35 10E12/L (ref 3.8–5.2)
RBC #/AREA URNS AUTO: 2 /HPF (ref 0–2)
SODIUM SERPL-SCNC: 123 MMOL/L (ref 133–144)
SODIUM SERPL-SCNC: 125 MMOL/L (ref 133–144)
SODIUM SERPL-SCNC: 131 MMOL/L (ref 133–144)
SODIUM SERPL-SCNC: NORMAL MMOL/L (ref 133–144)
SOURCE: ABNORMAL
SP GR UR STRIP: 1.02 (ref 1–1.03)
SQUAMOUS #/AREA URNS AUTO: <1 /HPF (ref 0–1)
UROBILINOGEN UR STRIP-MCNC: NORMAL MG/DL (ref 0–2)
WBC # BLD AUTO: 152.2 10E9/L (ref 4–11)
WBC #/AREA URNS AUTO: 2 /HPF (ref 0–5)

## 2019-07-19 PROCEDURE — 80048 BASIC METABOLIC PNL TOTAL CA: CPT | Performed by: INTERNAL MEDICINE

## 2019-07-19 PROCEDURE — 40000901 ZZH STATISTIC WOC PT EDUCATION, 0-15 MIN

## 2019-07-19 PROCEDURE — 94640 AIRWAY INHALATION TREATMENT: CPT | Mod: 76

## 2019-07-19 PROCEDURE — 94640 AIRWAY INHALATION TREATMENT: CPT

## 2019-07-19 PROCEDURE — 81001 URINALYSIS AUTO W/SCOPE: CPT | Performed by: INTERNAL MEDICINE

## 2019-07-19 PROCEDURE — 84295 ASSAY OF SERUM SODIUM: CPT | Performed by: INTERNAL MEDICINE

## 2019-07-19 PROCEDURE — 36415 COLL VENOUS BLD VENIPUNCTURE: CPT | Performed by: INTERNAL MEDICINE

## 2019-07-19 PROCEDURE — 85025 COMPLETE CBC W/AUTO DIFF WBC: CPT | Performed by: INTERNAL MEDICINE

## 2019-07-19 PROCEDURE — 25000132 ZZH RX MED GY IP 250 OP 250 PS 637: Mod: GY | Performed by: NURSE PRACTITIONER

## 2019-07-19 PROCEDURE — 12000000 ZZH R&B MED SURG/OB

## 2019-07-19 PROCEDURE — 40000894 ZZH STATISTIC OT IP EVAL DEFER: Performed by: REHABILITATION PRACTITIONER

## 2019-07-19 PROCEDURE — 99233 SBSQ HOSP IP/OBS HIGH 50: CPT | Performed by: INTERNAL MEDICINE

## 2019-07-19 PROCEDURE — 25000128 H RX IP 250 OP 636: Performed by: INTERNAL MEDICINE

## 2019-07-19 PROCEDURE — 25000125 ZZHC RX 250: Performed by: INTERNAL MEDICINE

## 2019-07-19 PROCEDURE — 71045 X-RAY EXAM CHEST 1 VIEW: CPT

## 2019-07-19 PROCEDURE — 40000274 ZZH STATISTIC RCP CONSULT EA 30 MIN

## 2019-07-19 PROCEDURE — 40000275 ZZH STATISTIC RCP TIME EA 10 MIN

## 2019-07-19 PROCEDURE — 83605 ASSAY OF LACTIC ACID: CPT | Performed by: INTERNAL MEDICINE

## 2019-07-19 PROCEDURE — 99233 SBSQ HOSP IP/OBS HIGH 50: CPT | Performed by: NURSE PRACTITIONER

## 2019-07-19 PROCEDURE — 25800030 ZZH RX IP 258 OP 636: Performed by: INTERNAL MEDICINE

## 2019-07-19 PROCEDURE — 87040 BLOOD CULTURE FOR BACTERIA: CPT | Performed by: INTERNAL MEDICINE

## 2019-07-19 PROCEDURE — 25000132 ZZH RX MED GY IP 250 OP 250 PS 637: Mod: GY | Performed by: INTERNAL MEDICINE

## 2019-07-19 RX ORDER — IPRATROPIUM BROMIDE AND ALBUTEROL SULFATE 2.5; .5 MG/3ML; MG/3ML
3 SOLUTION RESPIRATORY (INHALATION)
Status: DISCONTINUED | OUTPATIENT
Start: 2019-07-19 | End: 2019-07-24 | Stop reason: HOSPADM

## 2019-07-19 RX ORDER — MEROPENEM 500 MG/1
500 INJECTION, POWDER, FOR SOLUTION INTRAVENOUS EVERY 8 HOURS
Status: DISCONTINUED | OUTPATIENT
Start: 2019-07-19 | End: 2019-07-24 | Stop reason: HOSPADM

## 2019-07-19 RX ORDER — IPRATROPIUM BROMIDE AND ALBUTEROL SULFATE 2.5; .5 MG/3ML; MG/3ML
3 SOLUTION RESPIRATORY (INHALATION) 2 TIMES DAILY
Status: DISCONTINUED | OUTPATIENT
Start: 2019-07-19 | End: 2019-07-19

## 2019-07-19 RX ORDER — ACETAMINOPHEN 325 MG/1
650 TABLET ORAL EVERY 4 HOURS PRN
Status: DISCONTINUED | OUTPATIENT
Start: 2019-07-19 | End: 2019-07-20

## 2019-07-19 RX ORDER — ACETAMINOPHEN 650 MG/1
650 SUPPOSITORY RECTAL EVERY 4 HOURS PRN
Status: DISCONTINUED | OUTPATIENT
Start: 2019-07-19 | End: 2019-07-20

## 2019-07-19 RX ORDER — SODIUM CHLORIDE 9 MG/ML
1000 INJECTION, SOLUTION INTRAVENOUS CONTINUOUS
Status: DISCONTINUED | OUTPATIENT
Start: 2019-07-19 | End: 2019-07-21

## 2019-07-19 RX ORDER — ACETAMINOPHEN 650 MG/1
650 SUPPOSITORY RECTAL EVERY 4 HOURS PRN
Status: DISCONTINUED | OUTPATIENT
Start: 2019-07-19 | End: 2019-07-19

## 2019-07-19 RX ADMIN — TRAZODONE HYDROCHLORIDE 100 MG: 50 TABLET ORAL at 21:51

## 2019-07-19 RX ADMIN — Medication 2.5 MG: at 16:35

## 2019-07-19 RX ADMIN — ALBUTEROL SULFATE 2.5 MG: 2.5 SOLUTION RESPIRATORY (INHALATION) at 15:54

## 2019-07-19 RX ADMIN — SODIUM CHLORIDE 1000 ML: 9 INJECTION, SOLUTION INTRAVENOUS at 13:16

## 2019-07-19 RX ADMIN — GABAPENTIN 300 MG: 300 CAPSULE ORAL at 20:18

## 2019-07-19 RX ADMIN — MEROPENEM 500 MG: 500 INJECTION, POWDER, FOR SOLUTION INTRAVENOUS at 16:34

## 2019-07-19 RX ADMIN — Medication 2.5 MG: at 11:03

## 2019-07-19 RX ADMIN — DICLOFENAC 2 G: 10 GEL TOPICAL at 20:19

## 2019-07-19 RX ADMIN — SENNOSIDES AND DOCUSATE SODIUM 1 TABLET: 8.6; 5 TABLET ORAL at 20:18

## 2019-07-19 RX ADMIN — SODIUM CHLORIDE 1000 ML: 9 INJECTION, SOLUTION INTRAVENOUS at 19:34

## 2019-07-19 RX ADMIN — SENNOSIDES AND DOCUSATE SODIUM 2 TABLET: 8.6; 5 TABLET ORAL at 10:10

## 2019-07-19 RX ADMIN — FLUTICASONE FUROATE AND VILANTEROL TRIFENATATE 1 PUFF: 100; 25 POWDER RESPIRATORY (INHALATION) at 08:17

## 2019-07-19 RX ADMIN — SODIUM CHLORIDE 1000 ML: 9 INJECTION, SOLUTION INTRAVENOUS at 08:21

## 2019-07-19 RX ADMIN — ALPRAZOLAM 0.25 MG: 0.25 TABLET ORAL at 21:51

## 2019-07-19 RX ADMIN — IPRATROPIUM BROMIDE AND ALBUTEROL SULFATE 3 ML: .5; 3 SOLUTION RESPIRATORY (INHALATION) at 19:22

## 2019-07-19 RX ADMIN — MEROPENEM 500 MG: 500 INJECTION, POWDER, FOR SOLUTION INTRAVENOUS at 10:10

## 2019-07-19 RX ADMIN — Medication 2.5 MG: at 22:13

## 2019-07-19 RX ADMIN — LEVOTHYROXINE SODIUM 75 MCG: 75 TABLET ORAL at 10:11

## 2019-07-19 RX ADMIN — LIDOCAINE 2 PATCH: 560 PATCH PERCUTANEOUS; TOPICAL; TRANSDERMAL at 20:19

## 2019-07-19 RX ADMIN — ATORVASTATIN CALCIUM 20 MG: 20 TABLET, FILM COATED ORAL at 10:11

## 2019-07-19 RX ADMIN — DICLOFENAC 2 G: 10 GEL TOPICAL at 13:59

## 2019-07-19 RX ADMIN — DICLOFENAC 2 G: 10 GEL TOPICAL at 10:20

## 2019-07-19 RX ADMIN — ACETAMINOPHEN 650 MG: 325 TABLET, FILM COATED ORAL at 10:56

## 2019-07-19 RX ADMIN — ACETAMINOPHEN 650 MG: 325 TABLET, FILM COATED ORAL at 19:34

## 2019-07-19 RX ADMIN — GABAPENTIN 300 MG: 300 CAPSULE ORAL at 10:10

## 2019-07-19 ASSESSMENT — ACTIVITIES OF DAILY LIVING (ADL)
ADLS_ACUITY_SCORE: 21
ADLS_ACUITY_SCORE: 21
ADLS_ACUITY_SCORE: 18
ADLS_ACUITY_SCORE: 21

## 2019-07-19 NOTE — PROGRESS NOTES
Cross cover:    Notified of new fever to 102.6 Fahrenheit.  Mildly tachycardic, otherwise vitally stable.  Patient here with mechanical fall with multiple trauma, also noted history of CLL.  --We will check blood cultures, repeat CBC and BMP and check a lactic acid  --Check urine analysis  --Rounding MD to follow-up shortly this morning.  Might need to consider empiric antibiotics once cultures are obtained.    Randy Ma MD

## 2019-07-19 NOTE — PROGRESS NOTES
Oncology/Hematology Follow Up Note:    Assessment and Plan:  . CLL: The patient was diagnosed with CLL in April 2007, and has been receiving monthly IVIG therapy only for associated hypogammaglobulinemia  (last on 6/26/19).   Her white count has been elevated and fluctuating since diagnosis.  Received 1 dose of IV rituximab in the hospital, March 23, 2017, this was done for white count of 200-300 range there was concern of pseudohyperkalemia also.  - WBC over the past few months have been upward trending, but the patient has been hospitalized on at least 2 occasions for treatment of infection during this time, making that trend difficult to interpret.    - at this point, there is no indication that she is clearly progressing from CLL.  We will monitor her labs closely and reassess on an outpatient basis, once her current acute issues are resolved.   -  Plan was to delay monthly IVIG (due next week) and give in clinic once recovered from current hospitalization.  - will reacess this need based on her status today. First needs clarity on infection status.     2. Anemia, with drop in hemoglobin from 10.9 on 7/15/19 to 7/16. This decline could be due to trauma/fractures vs acute illness vs hemodilution from IVF. However, with her CLL, will check a hemolysis panel to rule out AIHA.  She has received a unit of PRBC today for hgb 6.6.     - No PRBC today, no indication for AIHA    3. Now with fever, has functional neutropenia with CLL  - Await urine and cultures.  - Will d/w team and plan on broad spectrum antibiotics.    4. DNR/ DNI    I d/w daughter Margaret and she is hoping things will revers and she will be better. If not she does want her mother to be comfortable.         Subjective:    Lethargic, awake intermittently, recognized me, weak, big change in stattus    Scheduled Medic ations:  Reviewed active medications    Labs:  CBC RESULTS:   Recent Labs   Lab Test 07/19/19  0548 07/18/19  2136 07/18/19  1606   07/18/19  0627 07/16/19  0733   .2*  --   --   --  124.6* 142.3*   HGB 8.7* 8.5* 8.9*   < > 6.8* 8.8*   HCT 29.7*  --   --   --  22.2* 29.4*   MCV 89  --   --   --  95 96   *  --   --   --  119* 142*    < > = values in this interval not displayed.       CMP  Recent Labs   Lab 07/19/19  0548 07/18/19  2340 07/18/19  1606 07/18/19  1401 07/18/19  1159 07/18/19  0627  07/17/19  1233 07/16/19  0733   NA Canceled, Test credited  123* 124* 126*  --  127* 122*   < > 122* 131*   POTASSIUM 4.9  --   --   --   --  4.7  --  5.4* 5.8*   CHLORIDE 91*  --   --   --   --  91*  --  90* 98   CO2 28  --   --   --   --  28  --  29 29   ANIONGAP 4  --   --   --   --  3  --  3 4   *  --   --   --   --  103*  --  114* 141*   BUN 7  --   --   --   --  10  --  13 19   CR 0.54  --   --   --   --  0.56  --  0.68 0.87   GFRESTIMATED 85  --   --   --   --  84  --  79 60*   GFRESTBLACK >90  --   --   --   --  >90  --  >90 70   CARLOS 8.5  --   --   --   --  7.9*  --  8.4* 8.5   BILITOTAL  --   --   --  1.3  --   --   --   --   --     < > = values in this interval not displayed.       INRNo lab results found in last 7 days.    Objective/Physical Exam:  Blood pressure 115/68, pulse 109, temperature 97.9  F (36.6  C), temperature source Axillary, resp. rate 24, weight 44.9 kg (99 lb), SpO2 90 %, not currently breastfeeding.  General appearance:alert, oriented, no acute distress  HEENT:sclera anicteric, no pallor, no thrush or mucositis.  Lungs: chest is clear to auscultation, no rales or rhonchi appreciated.  Abdomen: soft, NT, ND , BS normal  Ext: no edema or rashes    Isabel Ivory MD  [facility]  7/19/2019 8:53 AM

## 2019-07-19 NOTE — PLAN OF CARE
Discharge Planner OT   Patient plan for discharge: not stated to writer  Current status: OT orders received and chart reviewed. Patient was admitted after mechanical fall with multiple trauma including head trauma, upper extremity, pelvic fractures. Per PT patient transferred supine to sit with mod A x 2. Needs max A to scoot in a seated position. Able to sit EOB for ~ 5 minutes with intermittent CGA. Bed to chair via mechanical lift and Ax2. Plan is for TCU at discharge. Currently, patient has a fever with increased WBC, cultures and work-up pending. Patient was not appropriate for PT session today.   Barriers to return to prior living situation: defer to PT  Recommendations for discharge: defer to PT  Rationale for recommendations: currently patient is is well below baseline for mobility and ADL's, on this date, patient is not able to tolerate increased activity and is being followed by PT with discharge plan for TCU. Will discharge from OT services and defer UE ROM and advancement of activity to PT services and defer OT to next level of care. If patient is expected to have a prolonged hospital stay and is able to tolerate increased activity, will re-order OT when appropriate.        Entered by: Cherise Patterson 07/19/2019 10:35 AM

## 2019-07-19 NOTE — PROGRESS NOTES
"[facility]     Date: 7/19/19  Admission Dx: Shoulder fx  Pulmonary History: COPD  Home Nebulizer/MDI Use: alb prn, symbicort BID  Home Oxygen: none  Acuity Level (RCAT flow sheet): 3  Aerosol Therapy initiated:  Duoneb QID, alb prn, breo qd    Pulmonary Hygiene initiated: cough and deep breathe      Volume Expansion initiated:      Current Oxygen Requirements: 5lpm  Current SpO2: 92%    Re-evaluation date: 7/22/19    Patient Education: Patient aware of medication benefits and interactions.      See \"RT Assessments\" flow sheet for patient assessment scoring and Acuity Level Details.             "

## 2019-07-19 NOTE — PROGRESS NOTES
[facility]  Hospitalist Progress Note  Ramón Vazquez MD 07/19/2019    Reason for Stay (Diagnosis):     Fall     left proximal humerus fracture         Assessment and Plan:      Summary of Stay: Marie Kline, is an 87-year-old female with extensive past medical history including chronic obstructive pulmonary disease on oxygen, cardiomyopathy, hypertension, chronic lymphocytic leukemia, hypothyroidism who presented today to the emergency room status post mechanical fall on the stairs after she felt dizzy and sustained trauma to her left shoulder and also left leg and found to have left shoulder fracture and being admitted to the hospital for further evaluation and management.    Patient was closely monitored and orthopedic surgery consulted for evaluation of left arm fracture.  Orthopedic surgery recommended conservative measures.  Patient continues to complain of pelvic pain, buttock pain and x-ray evaluation revealed evidence of pelvic fracture.  Further CT imaging revealed bilateral para symphyseal pubic versus medial superior inferior rami fractures there is adjacent hematoma with mass-effect on the urinary bladder.  There is also right sacral alar fracture, left acetabular fracture and avulsion injury at the gluteal tendon attachment.    Pain control and mobility was difficult for this patient with multiple injuries    Problem List:   1.    Mechanical fall with multiple trauma including head trauma, upper extremity, pelvic fractures  Assessment:pain control ok at rest but still painful with mobility   Plan: Optimize pain control, pain management consulted to assist with this.  Continue PT, OT and discharge planning    2.  Left shoulder fracture secondary to fall: Left proximal humerus fracture  -- seen by ortho team,non operative intervention noted   -- pain control , immobilize in arm sling     3.  Extensive skin tear, left lower extremity.   -- wound care   4.  Scalp hematoma secondary to  fall.   -- no intracranial acute injury.  Patient has multiple staples applied on scalp laceration.    5.  Chronic medical conditions including chronic obstructive pulmonary disease, hypothyroidism, stable.     6.  Chronic lymphocytic leukemia.  The patient has followup with Hematology/Oncology.      7.Severe hip and buttock pain during PT session -further evaluation revealed extensive pelvic fracture which happened before admission.  Orthopedic surgery was reconsulted for further recommendations.  -- no surgery plan   8.  Pain secondary to multiple trauma: Pain management team was consulted and recommendation noted to continue IV Dilaudid, oxycodone every 4 hours as needed, topical medications and anxiolytics.    9.  Hyponatremia: suspect hypovolemic continue to monitor sodium,  normal saline. Urine studies not consistent with SIADH except Uosm.  Serum sodium is 125 today slightly increasing.  Continue IV fluid with  10.Hyperkalemia: improved to normal range   Continue to monitor for now  11.Severe anemia - suspect acute on chronic anemia due to pelvic fracture and hematoma    --Patient's hemoglobin was 6.6 and she required transfusion of a unit of blood.  Hemoglobin remained stable transfusion and it is 8.7 this morning.  12.  Acute febrile illness: Patient developed fever on July 19, 2019 with temp of 102.6, heart rate of 103.    Patient had blood culture, chest x-ray-on July 19 showed evidence of patchy abnormal air with opacity in the right lung concerning for developing pneumonia.    --Meropenem started by oncology service.  Continue to monitor for now.    13.  Goal of care: Patient condition is progressively declining.  Concern was discussed with patient's daughter Sadie who understood the care plan.  Palliative care is assisting with further goal of care physicians.  Patient most likely benefit from palliative comfort measures approach rather than restorative care as it is easy to rehabilitate this  patient.      DVT Prophylaxis: Pneumatic Compression Devices    Code Status: DNR / DNI    Discharge Dispo: TCU versus acute rehab unit    Estimated Disch Date / # of Days until Disch: Unable to determine at this time.  Likely needs in the next 4 days in the hospital until definitive care plan is clear        Interval History (Subjective):      Patient was seen and examined this morning.  Please review my preliminary progress note entered this morning.  Patient is declining and found to have fever.  She was started on antibiotic meropenem pancultures.                  Physical Exam:      Last Vital Signs:  /68 (BP Location: Right arm)   Pulse 109   Temp 97.2  F (36.2  C) (Oral)   Resp 24   Wt 44.9 kg (99 lb)   SpO2 90%   BMI 20.00 kg/m        Constitutional: Awake, alert, cooperative, mild apparent distress     Respiratory: Clear to auscultation bilaterally, no crackles or wheezing   Cardiovascular: Regular rate and rhythm, normal S1 and S2, and no murmur noted   Abdomen: Normal bowel sounds, soft, non-distended, non-tender   Skin:  Stapled scalp laceration, multiple skin ulcerations including upper extremity and lower extremities   Neuro: Alert , no focal weakness, numbness,   Extremities: Limited l range of motion   Other(s):        All other systems: Negative          Medications:      All current medications were reviewed with changes reflected in problem list.         Data:      All new lab and imaging data was reviewed.      Data reviewed today: I reviewed all new labs and imaging results over the last 24 hours. I personally reviewed no images or EKG's today      Recent Labs   Lab 07/19/19  0548 07/18/19  2136 07/18/19  1606  07/18/19  0627 07/16/19  0733   .2*  --   --   --  124.6* 142.3*   HGB 8.7* 8.5* 8.9*   < > 6.8* 8.8*   HCT 29.7*  --   --   --  22.2* 29.4*   MCV 89  --   --   --  95 96   *  --   --   --  119* 142*    < > = values in this interval not displayed.     Recent Labs    Lab 07/19/19  0645 07/19/19  0635   CULT PENDING PENDING     Recent Labs   Lab 07/19/19  1205 07/19/19  0548 07/18/19  2340  07/18/19  1401  07/18/19  0627  07/17/19  1233   * Canceled, Test credited  123* 124*   < >  --    < > 122*   < > 122*   POTASSIUM  --  4.9  --   --   --   --  4.7  --  5.4*   CHLORIDE  --  91*  --   --   --   --  91*  --  90*   CO2  --  28  --   --   --   --  28  --  29   ANIONGAP  --  4  --   --   --   --  3  --  3   GLC  --  122*  --   --   --   --  103*  --  114*   BUN  --  7  --   --   --   --  10  --  13   CR  --  0.54  --   --   --   --  0.56  --  0.68   GFRESTIMATED  --  85  --   --   --   --  84  --  79   GFRESTBLACK  --  >90  --   --   --   --  >90  --  >90   CARLOS  --  8.5  --   --   --   --  7.9*  --  8.4*   BILITOTAL  --   --   --   --  1.3  --   --   --   --     < > = values in this interval not displayed.       Recent Labs   Lab 07/19/19  0548 07/18/19  0627 07/17/19  1233 07/16/19  0733 07/15/19  1852   * 103* 114* 141* 128*       No results for input(s): INR in the last 168 hours.      No results for input(s): TROPONIN, TROPI, TROPR in the last 168 hours.    Invalid input(s): TROP, TROPONINIES    Recent Results (from the past 48 hour(s))   Head CT w/o contrast    Narrative    CT SCAN OF THE HEAD WITHOUT CONTRAST   7/15/2019 8:23 PM     HISTORY: fall, L parietal/occiptal hematoma    TECHNIQUE:  Axial images of the head and coronal reformations without  IV contrast material. Radiation dose for this scan was reduced using  automated exposure control, adjustment of the mA and/or kV according  to patient size, or iterative reconstruction technique.    COMPARISON: None.    FINDINGS:     Intracranial contents: There is diffuse parenchymal volume loss.   White matter changes are present in the cerebral hemispheres that are  consistent with small vessel ischemic disease in this age patient.  There is no evidence of intracranial hemorrhage, mass, acute infarct  or  anomaly.    Visualized orbits/sinuses/mastoids:  The visualized portions of the  sinuses and mastoids appear normal.    Osseous structures/soft tissues:  There is soft tissue swelling over  the left parietal region. No intracranial hemorrhage or skull  fractures.      Impression    IMPRESSION: No intracranial hemorrhage or skull fractures are  identified.      PATRIZIA DORSEY MD   XR Hand Left G/E 3 Views    Narrative    HAND THREE VIEWS LEFT  7/15/2019 8:57 PM     HISTORY: fall, pain    COMPARISON: None.      Impression    IMPRESSION: Age-indeterminate minimally displaced fractures of the  scaphoid waist and dorsal triquetrum. Normal joint alignment  maintained. Degenerative changes throughout the hand and wrist.  Chondrocalcinosis of the triangle fibrocartilage. Osteopenia.    ALEA BEYER MD   XR Shoulder Left G/E 3 Views    Narrative    XR SHOULDER LT G/E 3 VW   7/15/2019 8:59 PM     HISTORY:  fall, pain    COMPARISON:  None      Impression    IMPRESSION: Acute comminuted fracture of the proximal left humerus.  There is a fracture line across the surgical neck with lateral  displacement of the distal fracture fragment. A nondisplaced fracture  line extends into the greater tuberosity. Normal joint alignment  maintained. Surrounding soft tissue swelling. Aortic atherosclerosis.    ALEA BEYER MD   XR Tibia & Fibula Left 2 Views    Narrative    XR TIBIA & FIBULA LT 2 VW   7/15/2019 8:59 PM     HISTORY:  fall,pain    COMPARISON:  None      Impression    IMPRESSION: No acute fracture identified. Mild degenerative changes of  the knee and ankle joints. Osteopenia.    ALEA BEYER MD         Disclaimer: This note consists of symbols derived from keyboarding, dictation and/or voice recognition software. As a result, there may be errors in the script that have gone undetected. Please consider this when interpreting information found in this chart

## 2019-07-19 NOTE — PROGRESS NOTES
"SPIRITUAL HEALTH SERVICES Progress Note  [facility]    [facility] visit per follow up request from pt.    Introduced myself and oriented pt, Marie Pereira, and pt's daughter, Margaret, to [facility].    Pt was disoriented and in pain, and in and out of consciousness.  Pt's daughter reported that the pt has declined significantly since yesterday-- pt spiked a fever and has become confused.  Pt's daughter expressed feeling concerned for her mother's decline, and the difference in her condition since yesterday morning.    Pt's daughter spoke of both her siblings, citing that one lives in [place] and the other in [place].  Pt's daughter is unsure of the pt's condition and whether or not her siblings should come to [place].  She cites stress and weariness when speaking of being her mother's primary support and caregiver.    Pt cried out many times in our brief visit saying: \"God is good.\"  This author offered to visit the pt daily, to read her scripture and pray with her.    Pt's daughter welcomed ongoing prayers for the pt and her family.  Informed her of how to reach [facility] if desired.    Plan: [facility] remains available for spiritual/emotional support.      Melanie Luna   Intern  Pager: [phone]  "

## 2019-07-19 NOTE — PROGRESS NOTES
Focus: wound care  D: orders written for wound care on  7/16 but  Vaseline dressing continues sticking to wound. Will plan to add Wound gel to provide  Extra  Moisture to wound  Base    I: review of chart and discussion with RN    A/P: will plan to add  Wound gel      Left upper arm/ Elbow; LLE Wounds: Daily and PRN if soiled  1. Rinse old dressing with wound spray to gently remove.   2. Cleanse with Microklenz and gently pat dry  3. Apply small amount of Wound Gel to piece of Vaseline gauze and cover all open  areas   4. Cover with ABD/ Kerlix or Flex Net wrap- Tape

## 2019-07-19 NOTE — PLAN OF CARE
Pt A/O x4 - sleepy in between cares.  VSS and afebrile.  Oxygen saturation in the upper 90's on 2L nasal cannula.  Pain managed adequately with scheduled oxycodone, Voltaren gel, and lidocaine patches - prn oxycodone given as well.  CMS intact - swelling notable to left wrist and hand.  Dressings CDI.  Pt remained in bed today - attempt at using the recliner was too painful for her.  Rees catheter patent.  Minimal appetite - drank some Boost this shift.  Plan is TCU on discharge.  Will continue to monitor.

## 2019-07-19 NOTE — PROGRESS NOTES
SWS     D: Discharge planning.. call received from Brianda at [facility] informing that they would not be able to accept pt due to the Octagam IV, as noted yesterday [facility] able to accept pt as long as this med is on hold, and not on the discharge orders.

## 2019-07-19 NOTE — PROGRESS NOTES
Orthopedic Surgery Progress Note  Marie Kline  2019  Admit Date:  7/15/2019  Left displaced proximal humerus fracture  Bilateral pubic symphysis fractures, right inferior pubic rami fracture, left superior pubic fracture with involvement of the acetabular junction    Subjective: Patient febrile overnight with Tmax 102.6. Blood cultures have been drawn. Pain control and mobilization continues to be difficult. Pain team involved for pain management. Has been lethargic this morning, but reportedly is clearing. Patient denies chest pain, shortness of breath, new numbness/tingling, or new weakness.     Objective:    Vital Sign Ranges  Temperature Temp  Av.8  F (37.1  C)  Min: 96  F (35.6  C)  Max: 102.6  F (39.2  C)   Blood pressure Systolic (24hrs), Av , Min:115 , Max:162        Diastolic (24hrs), Av, Min:60, Max:76      Pulse Pulse  Av.7  Min: 93  Max: 109   Respirations Resp  Av.4  Min: 15  Max: 24   Pulse oximetry SpO2  Av.3 %  Min: 90 %  Max: 94 %     Labs:  Recent Labs   Lab Test 19  0548 19  0627 19  1233   POTASSIUM 4.9 4.7 5.4*     Recent Labs   Lab Test 19  0548 19  2136 19  1606   HGB 8.7* 8.5* 8.9*     Recent Labs   Lab Test 18  1306 10/09/14  0350   INR 1.00 1.21*     Recent Labs   Lab Test 19  0548 19  0627 19  0733   * 119* 142*       Exam:    Gen: No distress, sleeping comfortably in bed. Is lethargic, but appropriately answers questions, is responsive and involved.   Resp: Non labored respirations on NC  CV: RRR, extremities warm  MSK:   LUE:  - Ecchymoses over the anterior shoulder and proximal arm. Skin otherwise clean, dry, and intact  -Generalized TTP over the shoulder  - Sensation intact in axillary, median, radial, and ulnar nerve distributions  - 2+ radial pulse with brisk capillary refill   BLE:   - Sensation intact in dp, sp, sural, saph, and tibial nerve distributions  - Able to wiggle all  toes. Fires EHL and tib ant.   - 2+ DP pulses with brisk capillary refill    A/P: Marie Kline is a 87 year old female with left displaced proximal humerus fracture, bilateral pubic symphysis fractures, right inferior pubic rami fracture, left superior pubic fracture with involvement of the acetabular junction.    CT scan reviewed with Dr. Douglas. OK for patient to advance to WBAT on the BLE with assistance for transfers. Continue NWB LUE, sling for comfort. Mobilization with PT. Follow-up in 2 weeks with Dr. Ramey.     Sanjana Masters PA-C  Orthopedic Trauma and Arthroplasty  [facility]

## 2019-07-19 NOTE — PLAN OF CARE
Pt was very lethargic this morning, appears more alert and awake at this time.Stayed in bed during shift. Sling in place. NWB LUE. WBAT LLE. Weak hand , weak D/P LLE. Generalized bruising present. Skin tear to left elbow and left shin. Dressing to be changed this evening. Rees patent, urine yuly color. UA sent. Takes medications whole with apple sauce. Bowels active, +flatus. Poor appetite. Lungs diminished. Infrequent productive cough, light brown sputum. ABX merrem.. On 3L oxygen VSS. Palliative consult in place.

## 2019-07-19 NOTE — PROGRESS NOTES
[facility]  Pain Management Progress Note  Text Page     Assessment & Plan   Marie Kline is a 87 year old female who was admitted on 7/15/2019. I was asked by Dr. Vazquez to see the patient for acute on chronic pain management.     1)  Acute on chronic hip and back pain s/p mechanical fall and findings of left arm fracture, pelvic fractures and occipital scalp contusion without intracranial pathology.     2)  Patient with chronic low back and left hip pain, not on chronic opioid medication.  History of tolerating tramadol.  Baseline 0mg Daily Morphine Equivalent.  Patient has no expected opioid tolerance.      Patient's opioid use in past 24 hours: oxycodone 10 mg PO =  15 mg Daily Morphine Equivalent     3)  Risk factors for opioid related harms  - History of Renal insufficiency  - Age > 65 years old  - Anxiety/depression     4)  Opioid induced side-effects:  -Constipation - intermittent in the past when on opiate therapy.  -Nausea/Vomit - resolved.  -Sedation - has experienced sedation with opiates in the past.  No significant opiate use in past 48 hours to account for significant decline in mental status.   -Urinary Retention - none reported.     5)  Other/Related:    - Depression/anxiety - intermittent anxiety, home medication of xanax 0.25 mg bid prn.  - COPD on home oxygen - places patient at high risk for complications secondary to opiates.  - Lethargy, fever overnight - may not be opiate induced, however patient is at high risk for side effects.    6) Palliative Consult - patient's chronic medical conditions with new fractures and significant pain/immobility has placed her at high risk for complications.  Patient DNR/DNI with restorative goals including return to her previous living situation.    She has been bedbound for > 48 hours due to high levels of pain.  She has had worsening lethargy which complicates the ability to achieve adequate pain control.  New fever and worsening mental status  overnight with associated abnormal lab values.      There is no advanced directive on file, patient's adult children will jointly serve as next of kin for decision making.  There is a POLST on file indicating limited trials of mechanical intubation and artificial nutrition would be acceptable if used as a bridge to recovery to reasonable quality of life.     PLAN:   1)  Continue IV dilaudid for severe/breakthrough pain.   2)  schedule oxycodone 2.5 mg q 6 hours - hold for significant sedation.  3) Continue medical work up of electrolyte abnormalities and possible infection as cause of alteration in mental status.  4) Non-opioid multimodal medication therapy  -Topical: Voltaren 1% Topical Gel three times daily to bilateral hips, left arm. Lidocaine Patch 4% apply every evening to left hip and arm.  -N-SAIDS:Avoid due to recent trauma.  -Muscle Relaxants:None indicated  -Adjuvants:Acetaminophen 650 three times daily scheduled  -Antidepresants/anxiolytics: decrease alprazolam 0.25 mg at bedtime.  5)  Non-medication interventions  Positioning, ICE, Relaxation, Distraction with visitors, TV, Essential oils per nursing, Physical therapy as ordered.  6)  Opioids: Pain tolerable when not moving, 10/10 severe pain with movement as minimal as repositioning.  Unable to participate in therapy due to pain.  Pain primarily in bilateral hips, groin and left arm.  Encouraged patient to request PRN medications when due so that pain is maintained at tolerable levels.  Patient with minimal pain medications yesterday and continues to endorse significant pain, does not seem to remember to request pain medications.    - oxycodone 2.5 mg q 6 hours scheduled.  - oxycodone 2.5 - 5mg q 4 hours prn for severe pain.  - dilaudid 0.2 mg q 2 hours prn.  Opioids Treatment Goal:   -Improvement in function  -Participate in PT  -Management of acute pain during hospitalization, expected prolonged need for opioids after DC due to traumatic injury.  7)   Constipation Prophylaxis   Senna-S 1-2 tablets twice daily.  8)  Pain Education  -Opioid safe use, storage and disposal information included in DC AVS  - Request pain medications on regular basis to maintain tolerable pain levels and avoid extremes in pain sensation.  8)  DC Planning   Discussed goal of Opioid therapy as above with  patient's family and hospitalist.  Continued outpatient management of pain per rehabilitation facility team.  Disposition: pending.  Support systems: patient's family involved.  Daughter in town and son travelling from out of town.  Outpatient Referrals: none indicated.  The following risk factors have been identified for unintentional overdose: patient is > 65 years old, patient has anxiety, depression or PTSD, patient has been switched to a new opioid medication and patient has lung disease. Discharge with intra-nasal naloxone if discharged to home with opioids  >40 mg MME/day.  Plan for education prior to discharge     Na SONG, CNP  Pain Management and Palliative Care  [facility]  Pgr: [phone]    Time Spent on this Encounter   Total unit/floor time 60 minutes (1100 - 1200), time consisted of the following, examination of the patient, reviewing the record and completing documentation. >50% of time spent in counseling and coordination of care.  Time spend counseling with family consisted of the following topics, goals of care, education about prognosis and symptom management.  Time spent in coordination of care with Bedside Nurse , and Hospitalist Dr. Vazquez.       Interval History   Patient with intermittent severe pain overnight.  Unable to participate in therapy sessions.  In bed, lethargic.      Discussed plan of care with patient's daughter.  Emphasized that the patient's medical condition does not look favorable and that likelihood of successful recovery and return to previous level of function is low.  Talked about Marie's personality as a 'go  getter', strong willed nature and that she had previously wanted to be moderately aggressive with cares when hospitalized for COPD related issues.  Spoke about the compounded impact on Marie at this time of the significant pain, subsequent immobility and the baseline decreased lung capability due to her COPD.  Margaret, Marie's daughter verbalized understanding of the difficult medical situation.  Educated her about the circumstance that Marie faces, the difficult balance between pain control and mental status along with the detriment to Marie's lung function while she is lying in bed due to pain and the complications that the medical team is concerned with.  States that she would prefer to have her mother awake to talk with her, but in the event that she needs to be more sleepy in order to have her pain needs met she would be ok with that situation.  Margaret expressed understanding of the difficult situation, states she is overwhelmed by this information and her mother's change in status today.  Stated her brother is coming into town to be with her.  Stated her  is out of town this weekend.        Review of Systems   ZACHARY, patient not alert.    Physical Exam   Temp:  [96  F (35.6  C)-102.6  F (39.2  C)] 97.9  F (36.6  C)  Pulse:  [] 109  Heart Rate:  [] 106  Resp:  [15-24] 24  BP: (112-162)/(52-76) 115/68  SpO2:  [90 %-94 %] 90 %  99 lbs 0 oz  GEN:  Lethargic, arouses to voice, oriented to self, appears comfortable, NAD.  HEENT:  Normocephalic/atraumatic, no scleral icterus, no nasal discharge, mouth moist.  CV:  RRR, S1, S2; no murmurs or other irregularities noted.  +3 DP/PT pulses bilatererally; no edema BLE.  RESP:  Clear to auscultation bilaterally without rales/rhonchi/wheezing/retractions.  Symmetric chest rise on inhalation noted.  Normal respiratory effort.  ABD:  Rounded, soft, non-tender/non-distended.  +BS  EXT:  Edema & pulses as noted above.  CMS intact x 4.     M/S:   n/a.    SKIN:  Dry  to touch, no exanthems noted in the visualized areas.    NEURO: Symmetric strength +5/5.  Sensation to touch intact all extremities.   There is no area of allodynia or hyperesthesia.  PAIN BEHAVIOR: ZACHARY  Psych:  ZACHARY    Medications     - MEDICATION INSTRUCTIONS -       sodium chloride 1,000 mL (07/19/19 0821)       ALPRAZolam  0.25 mg Oral At Bedtime     atorvastatin  20 mg Oral Daily     diclofenac  2-4 g Transdermal TID     fluticasone-vilanterol  1 puff Inhalation Daily     gabapentin  300 mg Oral BID     levothyroxine  75 mcg Oral Daily     lidocaine  2 patch Transdermal Q24h    And     lidocaine   Transdermal Q24H    And     lidocaine   Transdermal Q8H     oxyCODONE  2.5 mg Oral Q6H     senna-docusate  1 tablet Oral BID    Or     senna-docusate  2 tablet Oral BID     sodium chloride (PF)  3 mL Intracatheter Q8H     traZODone  100 mg Oral At Bedtime       Data   Recent Labs   Lab 07/19/19  0548 07/18/19  2340 07/18/19  2136 07/18/19  1606 07/18/19  1401  07/18/19  0627  07/17/19  1233 07/16/19  0733   .2*  --   --   --   --   --  124.6*  --   --  142.3*   HGB 8.7*  --  8.5* 8.9* 9.1*   < > 6.8*  --   --  8.8*   MCV 89  --   --   --   --   --  95  --   --  96   *  --   --   --   --   --  119*  --   --  142*   NA Canceled, Test credited  123* 124*  --  126*  --    < > 122*   < > 122* 131*   POTASSIUM 4.9  --   --   --   --   --  4.7  --  5.4* 5.8*   CHLORIDE 91*  --   --   --   --   --  91*  --  90* 98   CO2 28  --   --   --   --   --  28  --  29 29   BUN 7  --   --   --   --   --  10  --  13 19   CR 0.54  --   --   --   --   --  0.56  --  0.68 0.87   ANIONGAP 4  --   --   --   --   --  3  --  3 4   CARLOS 8.5  --   --   --   --   --  7.9*  --  8.4* 8.5   *  --   --   --   --   --  103*  --  114* 141*   BILITOTAL  --   --   --   --  1.3  --   --   --   --   --     < > = values in this interval not displayed.

## 2019-07-19 NOTE — PROGRESS NOTES
Patient was seen and examined by me this morning.  Please review my detailed progress note at later time today.  Marie is not doing well this morning.  She is lethargic and has been having fever.  She appears to be clinically dehydrated.  Her sodium remains low.  She has been having some confusion and complained of cough with greenish sputum.  I spoke with patient daughter at bedside.  I also communicated with Dr. Ivory of oncology regarding patient's condition.  She was started on antibiotic with meropenem, I will get a chest x-ray, continue to monitor patient and increase fluid for hydration.  I will reconsult palliative care team to look into alternative care plans if she continues to decline.

## 2019-07-20 LAB
ABO + RH BLD: NORMAL
ABO + RH BLD: NORMAL
ALBUMIN SERPL-MCNC: 1.9 G/DL (ref 3.4–5)
ALP SERPL-CCNC: 86 U/L (ref 40–150)
ALT SERPL W P-5'-P-CCNC: 14 U/L (ref 0–50)
ANION GAP SERPL CALCULATED.3IONS-SCNC: 3 MMOL/L (ref 3–14)
AST SERPL W P-5'-P-CCNC: 14 U/L (ref 0–45)
BILIRUB SERPL-MCNC: 0.6 MG/DL (ref 0.2–1.3)
BLD GP AB SCN SERPL QL: NORMAL
BLD PROD TYP BPU: NORMAL
BLD PROD TYP BPU: NORMAL
BLD UNIT ID BPU: 0
BLOOD BANK CMNT PATIENT-IMP: NORMAL
BLOOD PRODUCT CODE: NORMAL
BPU ID: NORMAL
BUN SERPL-MCNC: 9 MG/DL (ref 7–30)
CALCIUM SERPL-MCNC: 8.7 MG/DL (ref 8.5–10.1)
CHLORIDE SERPL-SCNC: 104 MMOL/L (ref 94–109)
CO2 SERPL-SCNC: 29 MMOL/L (ref 20–32)
CREAT SERPL-MCNC: 0.58 MG/DL (ref 0.52–1.04)
DAT IGG-SP REAG RBC-IMP: NORMAL
ERYTHROCYTE [DISTWIDTH] IN BLOOD BY AUTOMATED COUNT: 20.8 % (ref 10–15)
GFR SERPL CREATININE-BSD FRML MDRD: 83 ML/MIN/{1.73_M2}
GLUCOSE SERPL-MCNC: 124 MG/DL (ref 70–99)
HCT VFR BLD AUTO: 23.7 % (ref 35–47)
HGB BLD-MCNC: 6.6 G/DL (ref 11.7–15.7)
HGB BLD-MCNC: 7.1 G/DL (ref 11.7–15.7)
HGB BLD-MCNC: 7.5 G/DL (ref 11.7–15.7)
MCH RBC QN AUTO: 27 PG (ref 26.5–33)
MCHC RBC AUTO-ENTMCNC: 30 G/DL (ref 31.5–36.5)
MCV RBC AUTO: 90 FL (ref 78–100)
NUM BPU REQUESTED: 2
PLATELET # BLD AUTO: 102 10E9/L (ref 150–450)
POTASSIUM SERPL-SCNC: 4.2 MMOL/L (ref 3.4–5.3)
PROT SERPL-MCNC: 5.3 G/DL (ref 6.8–8.8)
RBC # BLD AUTO: 2.63 10E12/L (ref 3.8–5.2)
SODIUM SERPL-SCNC: 132 MMOL/L (ref 133–144)
SODIUM SERPL-SCNC: 136 MMOL/L (ref 133–144)
SODIUM SERPL-SCNC: 137 MMOL/L (ref 133–144)
SPECIMEN EXP DATE BLD: NORMAL
TRANSFUSION STATUS PATIENT QL: NORMAL
TRANSFUSION STATUS PATIENT QL: NORMAL
WBC # BLD AUTO: 65.2 10E9/L (ref 4–11)

## 2019-07-20 PROCEDURE — 85027 COMPLETE CBC AUTOMATED: CPT | Performed by: INTERNAL MEDICINE

## 2019-07-20 PROCEDURE — 85018 HEMOGLOBIN: CPT | Performed by: INTERNAL MEDICINE

## 2019-07-20 PROCEDURE — 25000132 ZZH RX MED GY IP 250 OP 250 PS 637: Mod: GY | Performed by: INTERNAL MEDICINE

## 2019-07-20 PROCEDURE — 25000128 H RX IP 250 OP 636: Performed by: INTERNAL MEDICINE

## 2019-07-20 PROCEDURE — 36415 COLL VENOUS BLD VENIPUNCTURE: CPT | Performed by: INTERNAL MEDICINE

## 2019-07-20 PROCEDURE — P9016 RBC LEUKOCYTES REDUCED: HCPCS | Performed by: INTERNAL MEDICINE

## 2019-07-20 PROCEDURE — 40000275 ZZH STATISTIC RCP TIME EA 10 MIN

## 2019-07-20 PROCEDURE — 99233 SBSQ HOSP IP/OBS HIGH 50: CPT | Performed by: INTERNAL MEDICINE

## 2019-07-20 PROCEDURE — 25000132 ZZH RX MED GY IP 250 OP 250 PS 637: Mod: GY | Performed by: NURSE PRACTITIONER

## 2019-07-20 PROCEDURE — 25000125 ZZHC RX 250: Performed by: INTERNAL MEDICINE

## 2019-07-20 PROCEDURE — 12000000 ZZH R&B MED SURG/OB

## 2019-07-20 PROCEDURE — 25800030 ZZH RX IP 258 OP 636: Performed by: INTERNAL MEDICINE

## 2019-07-20 PROCEDURE — 94640 AIRWAY INHALATION TREATMENT: CPT

## 2019-07-20 PROCEDURE — 94640 AIRWAY INHALATION TREATMENT: CPT | Mod: 76

## 2019-07-20 PROCEDURE — 80053 COMPREHEN METABOLIC PANEL: CPT | Performed by: INTERNAL MEDICINE

## 2019-07-20 PROCEDURE — 84295 ASSAY OF SERUM SODIUM: CPT | Performed by: INTERNAL MEDICINE

## 2019-07-20 RX ORDER — ACETAMINOPHEN 500 MG
1000 TABLET ORAL EVERY 8 HOURS
Status: DISCONTINUED | OUTPATIENT
Start: 2019-07-20 | End: 2019-07-24 | Stop reason: HOSPADM

## 2019-07-20 RX ORDER — HYDROMORPHONE HYDROCHLORIDE 1 MG/ML
0.2 INJECTION, SOLUTION INTRAMUSCULAR; INTRAVENOUS; SUBCUTANEOUS
Status: DISCONTINUED | OUTPATIENT
Start: 2019-07-20 | End: 2019-07-24 | Stop reason: HOSPADM

## 2019-07-20 RX ADMIN — DICLOFENAC 2 G: 10 GEL TOPICAL at 11:15

## 2019-07-20 RX ADMIN — SENNOSIDES AND DOCUSATE SODIUM 2 TABLET: 8.6; 5 TABLET ORAL at 11:13

## 2019-07-20 RX ADMIN — HYDROMORPHONE HYDROCHLORIDE 0.2 MG: 1 INJECTION, SOLUTION INTRAMUSCULAR; INTRAVENOUS; SUBCUTANEOUS at 12:15

## 2019-07-20 RX ADMIN — IPRATROPIUM BROMIDE AND ALBUTEROL SULFATE 3 ML: .5; 3 SOLUTION RESPIRATORY (INHALATION) at 07:07

## 2019-07-20 RX ADMIN — ALPRAZOLAM 0.25 MG: 0.25 TABLET ORAL at 23:37

## 2019-07-20 RX ADMIN — ACETAMINOPHEN 650 MG: 325 TABLET, FILM COATED ORAL at 00:14

## 2019-07-20 RX ADMIN — ACETAMINOPHEN 1000 MG: 500 TABLET, FILM COATED ORAL at 19:52

## 2019-07-20 RX ADMIN — ALBUTEROL SULFATE 2.5 MG: 2.5 SOLUTION RESPIRATORY (INHALATION) at 00:32

## 2019-07-20 RX ADMIN — MEROPENEM 500 MG: 500 INJECTION, POWDER, FOR SOLUTION INTRAVENOUS at 09:36

## 2019-07-20 RX ADMIN — MEROPENEM 500 MG: 500 INJECTION, POWDER, FOR SOLUTION INTRAVENOUS at 00:22

## 2019-07-20 RX ADMIN — DICLOFENAC 2 G: 10 GEL TOPICAL at 14:36

## 2019-07-20 RX ADMIN — GABAPENTIN 300 MG: 300 CAPSULE ORAL at 19:53

## 2019-07-20 RX ADMIN — Medication 2.5 MG: at 05:13

## 2019-07-20 RX ADMIN — LIDOCAINE 2 PATCH: 560 PATCH PERCUTANEOUS; TOPICAL; TRANSDERMAL at 19:53

## 2019-07-20 RX ADMIN — SODIUM CHLORIDE 1000 ML: 9 INJECTION, SOLUTION INTRAVENOUS at 05:20

## 2019-07-20 RX ADMIN — GABAPENTIN 300 MG: 300 CAPSULE ORAL at 11:14

## 2019-07-20 RX ADMIN — TRAZODONE HYDROCHLORIDE 100 MG: 50 TABLET ORAL at 23:37

## 2019-07-20 RX ADMIN — MEROPENEM 500 MG: 500 INJECTION, POWDER, FOR SOLUTION INTRAVENOUS at 16:52

## 2019-07-20 RX ADMIN — POLYETHYLENE GLYCOL 3350 17 G: 17 POWDER, FOR SOLUTION ORAL at 14:54

## 2019-07-20 RX ADMIN — SODIUM CHLORIDE 1000 ML: 9 INJECTION, SOLUTION INTRAVENOUS at 20:50

## 2019-07-20 RX ADMIN — Medication 2.5 MG: at 11:13

## 2019-07-20 RX ADMIN — IPRATROPIUM BROMIDE AND ALBUTEROL SULFATE 3 ML: .5; 3 SOLUTION RESPIRATORY (INHALATION) at 15:18

## 2019-07-20 RX ADMIN — SENNOSIDES AND DOCUSATE SODIUM 2 TABLET: 8.6; 5 TABLET ORAL at 19:53

## 2019-07-20 RX ADMIN — ATORVASTATIN CALCIUM 20 MG: 20 TABLET, FILM COATED ORAL at 11:14

## 2019-07-20 RX ADMIN — IPRATROPIUM BROMIDE AND ALBUTEROL SULFATE 3 ML: .5; 3 SOLUTION RESPIRATORY (INHALATION) at 11:26

## 2019-07-20 RX ADMIN — FLUTICASONE FUROATE AND VILANTEROL TRIFENATATE 1 PUFF: 100; 25 POWDER RESPIRATORY (INHALATION) at 07:07

## 2019-07-20 RX ADMIN — ACETAMINOPHEN 1000 MG: 500 TABLET, FILM COATED ORAL at 14:36

## 2019-07-20 RX ADMIN — LEVOTHYROXINE SODIUM 75 MCG: 75 TABLET ORAL at 10:03

## 2019-07-20 RX ADMIN — Medication 2.5 MG: at 21:29

## 2019-07-20 RX ADMIN — DICLOFENAC 4 G: 10 GEL TOPICAL at 19:54

## 2019-07-20 ASSESSMENT — ACTIVITIES OF DAILY LIVING (ADL)
ADLS_ACUITY_SCORE: 21

## 2019-07-20 NOTE — PLAN OF CARE
Pt alert and oriented x4.  Lungs diminished , occasional  productive cough , scant amounts of yellow sputum.  Encouraged inspirometer use, on O2 at 4 lpm nasal cannula, scheduled neb treatment.  Dressing intact to left shoulder, forearm dressing change done, sling intact.Moderate swelling and bruising noted to left upper arm, hand.Extremity elevated on pillow, ice pack applied intermittently.Dressing to left leg  done, same intact.Turned, repositioned as needed.  Pain controlled with tylenol, oxycodone.Pt denies numbness/tingling to extremities.  Fall risk precautions.Plan of care reviewed with pt and family.

## 2019-07-20 NOTE — PLAN OF CARE
PT- per conversation with RN will hold for AM. Also does better with later AM sessions will change appointment note to reflect this.    PT PM attempted to see again still soundly sleeping.

## 2019-07-20 NOTE — PROGRESS NOTES
Orthopedic Surgery Progress Note  Marie Kline  2019  Admit Date:  7/15/2019  Left displaced proximal humerus fracture  Bilateral pubic symphysis fractures, right inferior pubic rami fracture, left superior pubic fracture with involvement of the acetabular junction    Subjective: No acute events overnight. Pain better controlled. Blood cultures no growth at 20 hours. Patient denies chest pain, shortness of breath, new numbness/tingling, or new weakness. Still has not mobilized with therapies.     Objective:    Vital Sign Ranges  Temperature Temp  Av.8  F (37.1  C)  Min: 96  F (35.6  C)  Max: 102.6  F (39.2  C)   Blood pressure Systolic (24hrs), Av , Min:115 , Max:162        Diastolic (24hrs), Av, Min:60, Max:76      Pulse Pulse  Av.7  Min: 93  Max: 109   Respirations Resp  Av.4  Min: 15  Max: 24   Pulse oximetry SpO2  Av.3 %  Min: 90 %  Max: 94 %     Labs:  Recent Labs   Lab Test 19  0548 19  0627 19  1233   POTASSIUM 4.9 4.7 5.4*     Recent Labs   Lab Test 19  0548 19  2136 19  1606   HGB 8.7* 8.5* 8.9*     Recent Labs   Lab Test 18  1306 10/09/14  0350   INR 1.00 1.21*     Recent Labs   Lab Test 19  0548 19  0627 19  0733   * 119* 142*       Exam:    Gen: No distress, sleeping comfortably in bed. Is lethargic, but responds to questions  Resp: Non labored respirations on NC  CV: RRR, extremities warm  MSK:   LUE:  - Ecchymoses over the anterior shoulder and proximal arm. Skin otherwise clean, dry, and intact  -Generalized TTP over the shoulder  - Sensation intact in axillary, median, radial, and ulnar nerve distributions  - 2+ radial pulse with brisk capillary refill   BLE:   - Sensation intact in dp, sp, sural, saph, and tibial nerve distributions  - Able to wiggle all toes. Fires EHL and tib ant.   - 2+ DP pulses with brisk capillary refill    A/P: Marie Kline is a 87 year old female with left displaced proximal  humerus fracture, bilateral pubic symphysis fractures, right inferior pubic rami fracture, left superior pubic fracture with involvement of the acetabular junction.    -WBAT BLE  -NWB LUE  -Sling LUE for comfort  -Mobilization with therapies  -Follow-up in 2 weeks with Dr. Ramey  -OK to discharge at any time per ortho. Defer to primary team.     Sanjana Masters PA-C  Orthopedic Trauma and Arthroplasty  [facility]

## 2019-07-20 NOTE — PROGRESS NOTES
Discharge Planner   Discharge Plans in progress: Spoke with thomas at [facility]. They have bed for pt today if she is ready to discontinue   Barriers to discharge plan: none Anticipated. Pt CAN NOT have order for Octagam  Follow up plan: TCU facility for weekend d.c support unit number [phone] fax [phone]  Paged MD to update pt has bed for today       Entered by: Corinne C. White 07/20/2019 11:09 AM

## 2019-07-20 NOTE — PLAN OF CARE
Pt is A/O, afebrile, VSS. LS diminished, with infrequent cough- PRN neb given. Rees patent. Hypoactive bowel sounds. Pain managed with PO Oxycodone, Tylenol, and ice. Tolerating regular diet. Dressings to Left shoulder, elbow and leg CDI. CMS intact. Will continue to monitor.

## 2019-07-20 NOTE — PLAN OF CARE
Pt alert during shift. Reporting pain to left shoulder and LLE. Weak hand . Sling in place.Pain managed with scheduled tylenol and oxycodone. IV dilaudid before dressing changes. Dressing changed per plan of care. Generalized bruising. +2 edema to LUE. Rees patent with adequate output. Meds whole with apple sauce. Bowels active, +flatus. PRN miralax. Fair appetite. VSS. Lungs diminished. Productive cough, yellow sputum. Requiring 2L oxygen. Hemoglobin 6.6. Released conditional order for blood transfusion. Will continue to monitor.

## 2019-07-20 NOTE — PROGRESS NOTES
[facility]    Oncology Progress Note    Date of Service (when I saw the patient): 07/20/2019     Assessment & Plan   Marie Kline is a 87 year old female who was admitted on 7/15/2019.       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  -has been on IV IGG  -can resume as outpatient    ID  -on empiric meropenem  -temp now down    Code Status: DNR/DNI    Augusto Chavez    Interval History   No new symptoms    Physical Exam   Temp: 95.5  F (35.3  C) Temp src: Axillary BP: 95/53   Heart Rate: 89 Resp: 18 SpO2: 95 % O2 Device: Nasal cannula with humidification Oxygen Delivery: 4 LPM  Vitals:    07/16/19 0051   Weight: 44.9 kg (99 lb)     Vital Signs with Ranges  Temp:  [95.2  F (35.1  C)-96.3  F (35.7  C)] 95.5  F (35.3  C)  Heart Rate:  [] 89  Resp:  [14-20] 18  BP: ()/(45-54) 95/53  SpO2:  [89 %-99 %] 95 %  I/O last 3 completed shifts:  In: 3193 [P.O.:1090; I.V.:2103]  Out: 2250 [Urine:2250]    Constitutional: awake, cooperative, dyspneic  Hematologic / Lymphatic: no cervical lymphadenopathy  GI: soft, non-distended, non-tender, no masses palpated  Musculoskeletal: no pedal edema    Medications     - MEDICATION INSTRUCTIONS -       sodium chloride 1,000 mL (07/20/19 0520)       acetaminophen  1,000 mg Oral Q8H     ALPRAZolam  0.25 mg Oral At Bedtime     atorvastatin  20 mg Oral Daily     diclofenac  2-4 g Transdermal TID     fluticasone-vilanterol  1 puff Inhalation Daily     gabapentin  300 mg Oral BID     ipratropium - albuterol 0.5 mg/2.5 mg/3 mL  3 mL Nebulization 4x daily     levothyroxine   75 mcg Oral Daily     lidocaine  2 patch Transdermal Q24h    And     lidocaine   Transdermal Q24H    And     lidocaine   Transdermal Q8H     meropenem  500 mg Intravenous Q8H     oxyCODONE  2.5 mg Oral Q6H     senna-docusate  1 tablet Oral BID    Or     senna-docusate  2 tablet Oral BID     sodium chloride (PF)  3 mL Intracatheter Q8H     traZODone  100 mg Oral At Bedtime       Data   Results for orders placed or performed during the hospital encounter of 07/15/19 (from the past 24 hour(s))   Sodium   Result Value Ref Range    Sodium 125 (L) 133 - 144 mmol/L   Sodium   Result Value Ref Range    Sodium 131 (L) 133 - 144 mmol/L   Sodium   Result Value Ref Range    Sodium 132 (L) 133 - 144 mmol/L   CBC with platelets   Result Value Ref Range    WBC 65.2 (HH) 4.0 - 11.0 10e9/L    RBC Count 2.63 (L) 3.8 - 5.2 10e12/L    Hemoglobin 7.1 (L) 11.7 - 15.7 g/dL    Hematocrit 23.7 (L) 35.0 - 47.0 %    MCV 90 78 - 100 fl    MCH 27.0 26.5 - 33.0 pg    MCHC 30.0 (L) 31.5 - 36.5 g/dL    RDW 20.8 (H) 10.0 - 15.0 %    Platelet Count 102 (L) 150 - 450 10e9/L   Comprehensive metabolic panel   Result Value Ref Range    Sodium 136 133 - 144 mmol/L    Potassium 4.2 3.4 - 5.3 mmol/L    Chloride 104 94 - 109 mmol/L    Carbon Dioxide 29 20 - 32 mmol/L    Anion Gap 3 3 - 14 mmol/L    Glucose 124 (H) 70 - 99 mg/dL    Urea Nitrogen 9 7 - 30 mg/dL    Creatinine 0.58 0.52 - 1.04 mg/dL    GFR Estimate 83 >60 mL/min/[1.73_m2]    GFR Estimate If Black >90 >60 mL/min/[1.73_m2]    Calcium 8.7 8.5 - 10.1 mg/dL    Bilirubin Total 0.6 0.2 - 1.3 mg/dL    Albumin 1.9 (L) 3.4 - 5.0 g/dL    Protein Total 5.3 (L) 6.8 - 8.8 g/dL    Alkaline Phosphatase 86 40 - 150 U/L    ALT 14 0 - 50 U/L    AST 14 0 - 45 U/L

## 2019-07-20 NOTE — PROGRESS NOTES
Patient was seen and examined by me this morning.  Events from last night reviewed.  Patient is complaining of leg pain around her wound on the left leg.  She has no chest pain or significant shortness of breath.  No fever or chills.  Wound evaluation revealed left leg ulcer with avulsion of skin and very painful while removing the bandage.  I reviewed her pain medications.  Scheduled Tylenol, continue scheduled oxycodone and discussed with pharmacy team regarding topical medications that can be helpful for controlling her symptoms.  Except for pain control patient condition is slightly improving with resolution of hyponatremia.Her white cell count is much better today.  Cultures negative today.  Plan is to continue antibiotic, pain control, monitor hemoglobin and transfuse for hemoglobin of 7 or less.  Review my detailed progress note to be completed at later time today.

## 2019-07-20 NOTE — PROGRESS NOTES
[facility]  Hospitalist Progress Note  Ramón Vazquez MD 07/20/2019    Reason for Stay (Diagnosis):     Fall with multiple trauma    left proximal humerus fracture    Pelvic fracture    Multiple skin lacerations and skin avulsions         Assessment and Plan:      Summary of Stay: Marie Kline, is an 87-year-old female with extensive past medical history including chronic obstructive pulmonary disease on oxygen, cardiomyopathy, hypertension, chronic lymphocytic leukemia, hypothyroidism who presented today to the emergency room status post mechanical fall on the stairs after she felt dizzy and sustained trauma to her left shoulder and also left leg and found to have left shoulder fracture and being admitted to the hospital for further evaluation and management.    Patient was closely monitored and orthopedic surgery consulted for evaluation of left arm fracture.  Orthopedic surgery recommended conservative measures.  Patient continues to complain of pelvic pain, buttock pain and x-ray evaluation revealed evidence of pelvic fracture.  Further CT imaging revealed bilateral para symphyseal pubic versus medial superior inferior rami fractures there is adjacent hematoma with mass-effect on the urinary bladder.  There is also right sacral alar fracture, left acetabular fracture and avulsion injury at the gluteal tendon attachment.    Pain control and mobility was difficult for this patient with multiple injuries        Problem List:   1.    Mechanical fall with multiple trauma including head trauma, upper extremity, pelvic fractures  Assessment: Pain is not well controlled today.  She has severe pain while manipulating or dressing changes for her left lower leg because her from injury PTA.    Plan: Optimize pain control, pain management consulted and patient is being managed by scheduled low-dose oxycodone, Tylenol as well as as needed Dilaudid.  She also has topical medications including Voltaren gel  and lidocaine patch..   --I will add PRN Dilaudid before wound dressing change, continue PT, OT and discharge planning    2.  Left shoulder fracture secondary to fall: Left proximal humerus fracture  -- seen by ortho team,non operative intervention noted   -- pain control , immobilize in arm sling     3.  Extensive skin tear, left lower extremity.   -- wound care.  This is painful with dressing changes.  Wound care is following.  I spoke with pharmacy team regarding possibility of morphine gel to be applied to the wound.  Unfortunately,Morphine there is not available and plan to use Voltaren gel around the wound site.  Morphine gel may be available on Monday    4.  Scalp hematoma secondary to fall.   -- no intracranial acute injury.  Patient has multiple staples applied on scalp laceration.  Staples needs to be removed in 7 days    5.  Chronic medical conditions including chronic obstructive pulmonary disease, hypothyroidism, stable.     6.  Chronic lymphocytic leukemia.  The patient has followup with Hematology/Oncology.      7.Severe hip and buttock pain during PT session -further evaluation revealed extensive pelvic fracture which happened before admission.  Orthopedic surgery was reconsulted for further recommendations.  -- no surgery plan   8.  Pain secondary to multiple trauma: Pain management team was consulted and recommendation noted to continue IV Dilaudid, oxycodone every 4 hours as needed, topical medications and anxiolytics.    9.  Hyponatremia: Hypovolemic, resolved with IV fluid.  10.Hyperkalemia: improved to normal range   Continue to monitor for now  11.Severe anemia - suspect acute on chronic anemia due to pelvic fracture and hematoma    --Patient's hemoglobin was 6.6 2 days ago and she required transfusion of a unit of blood.  Hemoglobin remained stable in 8 range until this morning when hemoglobin 7.1.  Will monitor hemoglobin every 8 hours and transfuse for hemoglobin of 7 or less.  12.  Acute  febrile illness: Patient developed fever on July 19, 2019 with temp of 102.6, heart rate of 103.    Patient had blood culture, chest x-ray-on July 19 showed evidence of patchy abnormal air with opacity in the right lung concerning for developing pneumonia.  I suspect this is community-acquired which evolved.    --Meropenem started by oncology service on July 19, 2019.  Continue to monitor for now.  Follow cultures.    13.  Goal of care: Patient is very frail and with multiple problems including multiple trauma and pelvic fracture unable to get up and work with physical therapy for possibility of discharge planning.  Palliative care team was consulted and care plan was discussed with patient's daughter.  Plan is restorative at this point with CODE STATUS indicated to be DNR/DNI.      DVT Prophylaxis: Pneumatic Compression Devices    Code Status: DNR / DNI    Discharge Dispo: TCU versus acute rehab unit    Estimated Disch Date / # of Days until Disch: Unable to determine at this point.  Patient needs to remain in the hospital for IV pain control and close monitoring.  May need several days before further care plan is decided in consultation with family.        Interval History (Subjective):      Patient was seen and examined by me this morning.  Please review preliminary progress note insert this morning.  Patient is complaining of a lot of pain in left lower extremity from her laceration.  She is not able to move due to pelvic pain.  She had issues with pain control overnight.  No nausea or vomiting.  No chest pain.  Multiple family members present at bedside and we discussed the care plan including blood transfusion and pain control.         Physical Exam:      Last Vital Signs:  BP 95/53 (BP Location: Right arm)   Pulse 109   Temp 95.5  F (35.3  C) (Axillary)   Resp 18   Wt 44.9 kg (99 lb)   SpO2 95%   BMI 20.00 kg/m        Constitutional: Awake, alert, cooperative, mild apparent distress     Respiratory:  Clear to auscultation bilaterally, no crackles or wheezing   Cardiovascular: Regular rate and rhythm, normal S1 and S2, and no murmur noted   Abdomen: Normal bowel sounds, soft, non-distended, non-tender   Skin:  Stapled scalp laceration, multiple skin ulcerations including upper extremity and lower extremities.  Very large left leg ulcer with avulsion of skin   Neuro: Alert , no focal weakness, numbness,   Extremities: Limited l range of motion   Other(s):        All other systems: Negative          Medications:      All current medications were reviewed with changes reflected in problem list.         Data:      All new lab and imaging data was reviewed.      Data reviewed today: I reviewed all new labs and imaging results over the last 24 hours. I personally reviewed no images or EKG's today      Recent Labs   Lab 07/20/19  0629 07/19/19  0548 07/18/19  2136  07/18/19  0627   WBC 65.2* 152.2*  --   --  124.6*   HGB 7.1* 8.7* 8.5*   < > 6.8*   HCT 23.7* 29.7*  --   --  22.2*   MCV 90 89  --   --  95   * 146*  --   --  119*    < > = values in this interval not displayed.     Recent Labs   Lab 07/19/19  0645 07/19/19  0635   CULT No growth after 20 hours No growth after 22 hours     Recent Labs   Lab 07/20/19  1205 07/20/19  0629 07/20/19  0002  07/19/19  0548  07/18/19  1401  07/18/19  0627    136 132*   < > Canceled, Test credited  123*   < >  --    < > 122*   POTASSIUM  --  4.2  --   --  4.9  --   --   --  4.7   CHLORIDE  --  104  --   --  91*  --   --   --  91*   CO2  --  29  --   --  28  --   --   --  28   ANIONGAP  --  3  --   --  4  --   --   --  3   GLC  --  124*  --   --  122*  --   --   --  103*   BUN  --  9  --   --  7  --   --   --  10   CR  --  0.58  --   --  0.54  --   --   --  0.56   GFRESTIMATED  --  83  --   --  85  --   --   --  84   GFRESTBLACK  --  >90  --   --  >90  --   --   --  >90   CARLOS  --  8.7  --   --  8.5  --   --   --  7.9*   PROTTOTAL  --  5.3*  --   --   --   --   --   --    --    ALBUMIN  --  1.9*  --   --   --   --   --   --   --    BILITOTAL  --  0.6  --   --   --   --  1.3  --   --    ALKPHOS  --  86  --   --   --   --   --   --   --    AST  --  14  --   --   --   --   --   --   --    ALT  --  14  --   --   --   --   --   --   --     < > = values in this interval not displayed.       Recent Labs   Lab 07/20/19  0629 07/19/19  0548 07/18/19  0627 07/17/19  1233 07/16/19  0733   * 122* 103* 114* 141*       No results for input(s): INR in the last 168 hours.      No results for input(s): TROPONIN, TROPI, TROPR in the last 168 hours.    Invalid input(s): TROP, TROPONINIES    Recent Results (from the past 48 hour(s))   Head CT w/o contrast    Narrative    CT SCAN OF THE HEAD WITHOUT CONTRAST   7/15/2019 8:23 PM     HISTORY: fall, L parietal/occiptal hematoma    TECHNIQUE:  Axial images of the head and coronal reformations without  IV contrast material. Radiation dose for this scan was reduced using  automated exposure control, adjustment of the mA and/or kV according  to patient size, or iterative reconstruction technique.    COMPARISON: None.    FINDINGS:     Intracranial contents: There is diffuse parenchymal volume loss.   White matter changes are present in the cerebral hemispheres that are  consistent with small vessel ischemic disease in this age patient.  There is no evidence of intracranial hemorrhage, mass, acute infarct  or anomaly.    Visualized orbits/sinuses/mastoids:  The visualized portions of the  sinuses and mastoids appear normal.    Osseous structures/soft tissues:  There is soft tissue swelling over  the left parietal region. No intracranial hemorrhage or skull  fractures.      Impression    IMPRESSION: No intracranial hemorrhage or skull fractures are  identified.      PATRIZIA DORSEY MD   XR Hand Left G/E 3 Views    Narrative    HAND THREE VIEWS LEFT  7/15/2019 8:57 PM     HISTORY: fall, pain    COMPARISON: None.      Impression    IMPRESSION: Age-indeterminate  minimally displaced fractures of the  scaphoid waist and dorsal triquetrum. Normal joint alignment  maintained. Degenerative changes throughout the hand and wrist.  Chondrocalcinosis of the triangle fibrocartilage. Osteopenia.    ALEA BEYER MD   XR Shoulder Left G/E 3 Views    Narrative    XR SHOULDER LT G/E 3 VW   7/15/2019 8:59 PM     HISTORY:  fall, pain    COMPARISON:  None      Impression    IMPRESSION: Acute comminuted fracture of the proximal left humerus.  There is a fracture line across the surgical neck with lateral  displacement of the distal fracture fragment. A nondisplaced fracture  line extends into the greater tuberosity. Normal joint alignment  maintained. Surrounding soft tissue swelling. Aortic atherosclerosis.    ALEA BEYER MD   XR Tibia & Fibula Left 2 Views    Narrative    XR TIBIA & FIBULA LT 2 VW   7/15/2019 8:59 PM     HISTORY:  fall,pain    COMPARISON:  None      Impression    IMPRESSION: No acute fracture identified. Mild degenerative changes of  the knee and ankle joints. Osteopenia.    ALEA BEYER MD         Disclaimer: This note consists of symbols derived from keyboarding, dictation and/or voice recognition software. As a result, there may be errors in the script that have gone undetected. Please consider this when interpreting information found in this chart

## 2019-07-21 ENCOUNTER — APPOINTMENT (OUTPATIENT)
Dept: GENERAL RADIOLOGY | Facility: CLINIC | Age: 84
DRG: 562 | End: 2019-07-21
Attending: INTERNAL MEDICINE
Payer: MEDICARE

## 2019-07-21 ENCOUNTER — APPOINTMENT (OUTPATIENT)
Dept: PHYSICAL THERAPY | Facility: CLINIC | Age: 84
DRG: 562 | End: 2019-07-21
Payer: MEDICARE

## 2019-07-21 LAB
ALBUMIN SERPL-MCNC: 1.7 G/DL (ref 3.4–5)
ALP SERPL-CCNC: 87 U/L (ref 40–150)
ALT SERPL W P-5'-P-CCNC: 34 U/L (ref 0–50)
ANION GAP SERPL CALCULATED.3IONS-SCNC: 3 MMOL/L (ref 3–14)
AST SERPL W P-5'-P-CCNC: 59 U/L (ref 0–45)
BILIRUB SERPL-MCNC: 0.5 MG/DL (ref 0.2–1.3)
BUN SERPL-MCNC: 9 MG/DL (ref 7–30)
CALCIUM SERPL-MCNC: 8.4 MG/DL (ref 8.5–10.1)
CHLORIDE SERPL-SCNC: 107 MMOL/L (ref 94–109)
CO2 SERPL-SCNC: 28 MMOL/L (ref 20–32)
CREAT SERPL-MCNC: 0.54 MG/DL (ref 0.52–1.04)
ERYTHROCYTE [DISTWIDTH] IN BLOOD BY AUTOMATED COUNT: 19.8 % (ref 10–15)
GFR SERPL CREATININE-BSD FRML MDRD: 85 ML/MIN/{1.73_M2}
GLUCOSE SERPL-MCNC: 106 MG/DL (ref 70–99)
HCT VFR BLD AUTO: 24.5 % (ref 35–47)
HGB BLD-MCNC: 7.4 G/DL (ref 11.7–15.7)
MCH RBC QN AUTO: 27.6 PG (ref 26.5–33)
MCHC RBC AUTO-ENTMCNC: 30.2 G/DL (ref 31.5–36.5)
MCV RBC AUTO: 91 FL (ref 78–100)
PLATELET # BLD AUTO: 115 10E9/L (ref 150–450)
POTASSIUM SERPL-SCNC: 4.7 MMOL/L (ref 3.4–5.3)
PROT SERPL-MCNC: 4.7 G/DL (ref 6.8–8.8)
RBC # BLD AUTO: 2.68 10E12/L (ref 3.8–5.2)
SODIUM SERPL-SCNC: 138 MMOL/L (ref 133–144)
WBC # BLD AUTO: 40 10E9/L (ref 4–11)

## 2019-07-21 PROCEDURE — 25800030 ZZH RX IP 258 OP 636: Performed by: INTERNAL MEDICINE

## 2019-07-21 PROCEDURE — 80053 COMPREHEN METABOLIC PANEL: CPT | Performed by: INTERNAL MEDICINE

## 2019-07-21 PROCEDURE — 25000132 ZZH RX MED GY IP 250 OP 250 PS 637: Mod: GY | Performed by: INTERNAL MEDICINE

## 2019-07-21 PROCEDURE — 25000128 H RX IP 250 OP 636: Performed by: INTERNAL MEDICINE

## 2019-07-21 PROCEDURE — 40000275 ZZH STATISTIC RCP TIME EA 10 MIN

## 2019-07-21 PROCEDURE — 85018 HEMOGLOBIN: CPT | Performed by: INTERNAL MEDICINE

## 2019-07-21 PROCEDURE — 99233 SBSQ HOSP IP/OBS HIGH 50: CPT | Performed by: INTERNAL MEDICINE

## 2019-07-21 PROCEDURE — 25000125 ZZHC RX 250: Performed by: INTERNAL MEDICINE

## 2019-07-21 PROCEDURE — 12000000 ZZH R&B MED SURG/OB

## 2019-07-21 PROCEDURE — 25000132 ZZH RX MED GY IP 250 OP 250 PS 637: Mod: GY | Performed by: NURSE PRACTITIONER

## 2019-07-21 PROCEDURE — 97530 THERAPEUTIC ACTIVITIES: CPT | Mod: GP | Performed by: PHYSICAL THERAPY ASSISTANT

## 2019-07-21 PROCEDURE — 74019 RADEX ABDOMEN 2 VIEWS: CPT

## 2019-07-21 PROCEDURE — 94640 AIRWAY INHALATION TREATMENT: CPT | Mod: 76

## 2019-07-21 PROCEDURE — 94640 AIRWAY INHALATION TREATMENT: CPT

## 2019-07-21 PROCEDURE — 36415 COLL VENOUS BLD VENIPUNCTURE: CPT | Performed by: INTERNAL MEDICINE

## 2019-07-21 PROCEDURE — 85027 COMPLETE CBC AUTOMATED: CPT | Performed by: INTERNAL MEDICINE

## 2019-07-21 RX ORDER — BISACODYL 5 MG
15 TABLET, DELAYED RELEASE (ENTERIC COATED) ORAL DAILY PRN
Status: DISCONTINUED | OUTPATIENT
Start: 2019-07-21 | End: 2019-07-24 | Stop reason: HOSPADM

## 2019-07-21 RX ORDER — AMOXICILLIN 250 MG
3 CAPSULE ORAL AT BEDTIME
Status: ACTIVE | OUTPATIENT
Start: 2019-07-21 | End: 2019-07-22

## 2019-07-21 RX ORDER — LEVALBUTEROL INHALATION SOLUTION 1.25 MG/3ML
1.25 SOLUTION RESPIRATORY (INHALATION) EVERY 6 HOURS PRN
Status: DISCONTINUED | OUTPATIENT
Start: 2019-07-21 | End: 2019-07-24 | Stop reason: HOSPADM

## 2019-07-21 RX ORDER — POLYETHYLENE GLYCOL 3350 17 G/17G
17 POWDER, FOR SOLUTION ORAL DAILY
Status: DISCONTINUED | OUTPATIENT
Start: 2019-07-21 | End: 2019-07-22

## 2019-07-21 RX ORDER — BISACODYL 5 MG
5 TABLET, DELAYED RELEASE (ENTERIC COATED) ORAL DAILY PRN
Status: DISCONTINUED | OUTPATIENT
Start: 2019-07-21 | End: 2019-07-24 | Stop reason: HOSPADM

## 2019-07-21 RX ORDER — BISACODYL 10 MG
10 SUPPOSITORY, RECTAL RECTAL DAILY PRN
Status: DISCONTINUED | OUTPATIENT
Start: 2019-07-21 | End: 2019-07-24 | Stop reason: HOSPADM

## 2019-07-21 RX ORDER — FUROSEMIDE 20 MG
20 TABLET ORAL DAILY
Status: DISCONTINUED | OUTPATIENT
Start: 2019-07-21 | End: 2019-07-24 | Stop reason: HOSPADM

## 2019-07-21 RX ORDER — METOPROLOL SUCCINATE 25 MG/1
25 TABLET, EXTENDED RELEASE ORAL DAILY
Status: DISCONTINUED | OUTPATIENT
Start: 2019-07-21 | End: 2019-07-24 | Stop reason: HOSPADM

## 2019-07-21 RX ORDER — BISACODYL 5 MG
10 TABLET, DELAYED RELEASE (ENTERIC COATED) ORAL DAILY PRN
Status: DISCONTINUED | OUTPATIENT
Start: 2019-07-21 | End: 2019-07-24 | Stop reason: HOSPADM

## 2019-07-21 RX ADMIN — ACETAMINOPHEN 1000 MG: 500 TABLET, FILM COATED ORAL at 04:38

## 2019-07-21 RX ADMIN — DICLOFENAC 3 G: 10 GEL TOPICAL at 21:23

## 2019-07-21 RX ADMIN — Medication 2.5 MG: at 15:41

## 2019-07-21 RX ADMIN — HYDROMORPHONE HYDROCHLORIDE 0.2 MG: 1 INJECTION, SOLUTION INTRAMUSCULAR; INTRAVENOUS; SUBCUTANEOUS at 22:33

## 2019-07-21 RX ADMIN — HYDROMORPHONE HYDROCHLORIDE 0.2 MG: 1 INJECTION, SOLUTION INTRAMUSCULAR; INTRAVENOUS; SUBCUTANEOUS at 17:09

## 2019-07-21 RX ADMIN — SODIUM CHLORIDE 1000 ML: 9 INJECTION, SOLUTION INTRAVENOUS at 07:01

## 2019-07-21 RX ADMIN — IPRATROPIUM BROMIDE AND ALBUTEROL SULFATE 3 ML: .5; 3 SOLUTION RESPIRATORY (INHALATION) at 16:02

## 2019-07-21 RX ADMIN — MEROPENEM 500 MG: 500 INJECTION, POWDER, FOR SOLUTION INTRAVENOUS at 01:09

## 2019-07-21 RX ADMIN — LEVOTHYROXINE SODIUM 75 MCG: 75 TABLET ORAL at 08:56

## 2019-07-21 RX ADMIN — SERTRALINE HYDROCHLORIDE 50 MG: 50 TABLET ORAL at 15:02

## 2019-07-21 RX ADMIN — MEROPENEM 500 MG: 500 INJECTION, POWDER, FOR SOLUTION INTRAVENOUS at 08:56

## 2019-07-21 RX ADMIN — FLUTICASONE FUROATE AND VILANTEROL TRIFENATATE 1 PUFF: 100; 25 POWDER RESPIRATORY (INHALATION) at 07:14

## 2019-07-21 RX ADMIN — METOPROLOL SUCCINATE 25 MG: 25 TABLET, EXTENDED RELEASE ORAL at 15:01

## 2019-07-21 RX ADMIN — GABAPENTIN 300 MG: 300 CAPSULE ORAL at 21:05

## 2019-07-21 RX ADMIN — Medication 2.5 MG: at 10:44

## 2019-07-21 RX ADMIN — LIDOCAINE 2 PATCH: 560 PATCH PERCUTANEOUS; TOPICAL; TRANSDERMAL at 21:19

## 2019-07-21 RX ADMIN — Medication 2.5 MG: at 04:39

## 2019-07-21 RX ADMIN — IPRATROPIUM BROMIDE AND ALBUTEROL SULFATE 3 ML: .5; 3 SOLUTION RESPIRATORY (INHALATION) at 12:06

## 2019-07-21 RX ADMIN — IPRATROPIUM BROMIDE AND ALBUTEROL SULFATE 3 ML: .5; 3 SOLUTION RESPIRATORY (INHALATION) at 07:14

## 2019-07-21 RX ADMIN — SODIUM PHOSPHATE, DIBASIC AND SODIUM PHOSPHATE, MONOBASIC 1 ENEMA: 7; 19 ENEMA RECTAL at 15:40

## 2019-07-21 RX ADMIN — ATORVASTATIN CALCIUM 20 MG: 20 TABLET, FILM COATED ORAL at 08:56

## 2019-07-21 RX ADMIN — FUROSEMIDE 20 MG: 20 TABLET ORAL at 15:02

## 2019-07-21 RX ADMIN — MEROPENEM 500 MG: 500 INJECTION, POWDER, FOR SOLUTION INTRAVENOUS at 17:09

## 2019-07-21 RX ADMIN — ACETAMINOPHEN 1000 MG: 500 TABLET, FILM COATED ORAL at 12:20

## 2019-07-21 RX ADMIN — DICLOFENAC 4 G: 10 GEL TOPICAL at 14:58

## 2019-07-21 RX ADMIN — DICLOFENAC 4 G: 10 GEL TOPICAL at 08:57

## 2019-07-21 RX ADMIN — SENNOSIDES AND DOCUSATE SODIUM 1 TABLET: 8.6; 5 TABLET ORAL at 21:08

## 2019-07-21 RX ADMIN — ACETAMINOPHEN 1000 MG: 500 TABLET, FILM COATED ORAL at 21:05

## 2019-07-21 RX ADMIN — GABAPENTIN 300 MG: 300 CAPSULE ORAL at 08:56

## 2019-07-21 RX ADMIN — SENNOSIDES AND DOCUSATE SODIUM 2 TABLET: 8.6; 5 TABLET ORAL at 08:56

## 2019-07-21 RX ADMIN — ALPRAZOLAM 0.25 MG: 0.25 TABLET ORAL at 21:05

## 2019-07-21 RX ADMIN — TRAZODONE HYDROCHLORIDE 100 MG: 50 TABLET ORAL at 21:05

## 2019-07-21 RX ADMIN — Medication 2.5 MG: at 21:05

## 2019-07-21 RX ADMIN — IPRATROPIUM BROMIDE AND ALBUTEROL SULFATE 3 ML: .5; 3 SOLUTION RESPIRATORY (INHALATION) at 19:26

## 2019-07-21 RX ADMIN — POLYETHYLENE GLYCOL 3350 17 G: 17 POWDER, FOR SOLUTION ORAL at 14:58

## 2019-07-21 RX ADMIN — HYDROMORPHONE HYDROCHLORIDE 0.2 MG: 1 INJECTION, SOLUTION INTRAMUSCULAR; INTRAVENOUS; SUBCUTANEOUS at 04:53

## 2019-07-21 RX ADMIN — HYDROMORPHONE HYDROCHLORIDE 0.2 MG: 1 INJECTION, SOLUTION INTRAMUSCULAR; INTRAVENOUS; SUBCUTANEOUS at 12:20

## 2019-07-21 ASSESSMENT — ACTIVITIES OF DAILY LIVING (ADL)
ADLS_ACUITY_SCORE: 18
ADLS_ACUITY_SCORE: 21
ADLS_ACUITY_SCORE: 20
ADLS_ACUITY_SCORE: 21

## 2019-07-21 NOTE — PLAN OF CARE
VS-afebrile, hgb was 6.6 gave one unit of blood, hgb being checked every 8 hours. 2200 result was 7.5. Conditional orders are to transfuse if less than 7.0. Tolerated blood well.   Lung Sounds-diminished bases, +loose prod cough, yellow/ green in color, using IS with encouragement upto 750-1000. Nebs given. Encouraged coughing.   O2- on 3 L humidified oxygen. Nebs per RT. continuous pulse ox on.   GI-+faint, hypo bs, +flatus, lbm was one week ago. Senna given. Miralax was given at the end of day shift. Tolerating boost shakes   -tatum in and adequate.   IVF-at 100. Blood 1 unit also given. On merrem.   Dressings-drsgs changes done on day shift. Very painful for pt. All drsg are  c/d/I. Drsg to L shoulder, L elbow and L shin.   CMS-trace swelling on L ankle/foot. Mild swelling to L hand/fingers/ wrist. L arm in sling NWB, L leg is wbat. +radial pulse, +pp, weak L hand grasp, weak dorsi/planter flexion.   Drain-none.   Activity-bedrest, turning in bed.   Pain-oxycodone scheduled.held the 1645 dose due to pt being sleepy and slept thru cares. Gave oxycodone around 2130. Also taking tylenol, lidocaine patches, voltarin gel to L shoulder,elbow,  D/C Plan-PT, WOC, ortho, hospitalist, following pt. To be determined.

## 2019-07-21 NOTE — PLAN OF CARE
Pt slept well until 0430,in a lot of pain with repositioning,difficult for her to get comfortable. Good urine output

## 2019-07-21 NOTE — PROGRESS NOTES
Orthopedic Surgery Progress Note  Marie Kline  2019  Admit Date:  7/15/2019  Left displaced proximal humerus fracture  Bilateral pubic symphysis fractures, right inferior pubic rami fracture, left superior pubic fracture with involvement of the acetabular junction    Subjective: No acute events overnight. Patient much more awake and interactive this afternoon. Family at bedside. Was able to pivot transfer to the [facility]. Patient denies chest pain, shortness of breath, new numbness/tingling, or new weakness.     Objective:    Vital Sign Ranges  Temperature Temp  Av.8  F (37.1  C)  Min: 96  F (35.6  C)  Max: 102.6  F (39.2  C)   Blood pressure Systolic (24hrs), Av , Min:115 , Max:162        Diastolic (24hrs), Av, Min:60, Max:76      Pulse Pulse  Av.7  Min: 93  Max: 109   Respirations Resp  Av.4  Min: 15  Max: 24   Pulse oximetry SpO2  Av.3 %  Min: 90 %  Max: 94 %     Labs:  Recent Labs   Lab Test 19  0548 19  0627 19  1233   POTASSIUM 4.9 4.7 5.4*     Recent Labs   Lab Test 19  0548 19  2136 19  1606   HGB 8.7* 8.5* 8.9*     Recent Labs   Lab Test 18  1306 10/09/14  0350   INR 1.00 1.21*     Recent Labs   Lab Test 19  0548 19  0627 19  0733   * 119* 142*       Exam:    Gen: No distress, sleeping comfortably in bed. Alert and oriented x 3.  Resp: Non labored respirations on NC  CV: RRR, extremities warm  MSK:   LUE:  - Ecchymoses over the anterior shoulder and proximal arm. Skin otherwise clean, dry, and intact  -Generalized TTP over the shoulder  - Sensation intact in axillary, median, radial, and ulnar nerve distributions  - 2+ radial pulse with brisk capillary refill   BLE:   - Sensation intact in dp, sp, sural, saph, and tibial nerve distributions  - Able to wiggle all toes. Fires EHL and tib ant.   - 2+ DP pulses with brisk capillary refill    A/P: Marie Kline is a 87 year old female with left displaced proximal  humerus fracture, bilateral pubic symphysis fractures, right inferior pubic rami fracture, left superior pubic fracture with involvement of the acetabular junction.    -WBAT BLE  -NWB LUE  -Sling LUE for comfort  -Mobilization with therapies  -Follow-up in 2 weeks with Dr. Ramey  -OK to discharge at any time per ortho. Defer to primary team.     Sanjana Masters PA-C  Orthopedic Trauma and Arthroplasty  [facility]

## 2019-07-21 NOTE — PROGRESS NOTES
[facility]    Oncology Progress Note    Date of Service (when I saw the patient): 07/21/2019     Assessment & Plan   Marie Kline is a 87 year old female who was admitted on 7/15/2019.       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  -has been on IV IGG  -can resume as outpatient    ID  -on empiric meropenem  -temp now down    Code Status: DNR/DNI    Augusto Chavez    Interval History   No new symptoms    Physical Exam   Temp: 95.4  F (35.2  C) Temp src: Oral BP: 128/48 Pulse: 87 Heart Rate: 89 Resp: 16 SpO2: 98 % O2 Device: Nasal cannula Oxygen Delivery: 3 LPM  Vitals:    07/16/19 0051   Weight: 44.9 kg (99 lb)     Vital Signs with Ranges  Temp:  [95.4  F (35.2  C)-97.8  F (36.6  C)] 95.4  F (35.2  C)  Pulse:  [85-89] 87  Heart Rate:  [68-89] 89  Resp:  [16-18] 16  BP: ()/(45-61) 128/48  SpO2:  [92 %-99 %] 98 %  I/O last 3 completed shifts:  In: 2466.67 [I.V.:2466.67]  Out: 2175 [Urine:2175]    Constitutional: awake, cooperative, dyspneic  Hematologic / Lymphatic: no cervical lymphadenopathy  GI: soft, non-distended, non-tender, no masses palpated  Musculoskeletal: no pedal edema    Medications     - MEDICATION INSTRUCTIONS -       sodium chloride 1,000 mL (07/21/19 0701)       acetaminophen  1,000 mg Oral Q8H     ALPRAZolam  0.25 mg Oral At Bedtime     atorvastatin  20 mg Oral Daily     diclofenac  2-4 g Transdermal TID     fluticasone-vilanterol  1 puff Inhalation Daily     gabapentin  300  mg Oral BID     ipratropium - albuterol 0.5 mg/2.5 mg/3 mL  3 mL Nebulization 4x daily     levothyroxine  75 mcg Oral Daily     lidocaine  2 patch Transdermal Q24h    And     lidocaine   Transdermal Q24H    And     lidocaine   Transdermal Q8H     meropenem  500 mg Intravenous Q8H     oxyCODONE  2.5 mg Oral Q6H     senna-docusate  1 tablet Oral BID    Or     senna-docusate  2 tablet Oral BID     sodium chloride (PF)  3 mL Intracatheter Q8H     traZODone  100 mg Oral At Bedtime       Data   Results for orders placed or performed during the hospital encounter of 07/15/19 (from the past 24 hour(s))   Sodium   Result Value Ref Range    Sodium 137 133 - 144 mmol/L   Hemoglobin (Every 6 Hrs)   Result Value Ref Range    Hemoglobin 6.6 (LL) 11.7 - 15.7 g/dL   Hemoglobin (Every 6 Hrs)   Result Value Ref Range    Hemoglobin 7.5 (L) 11.7 - 15.7 g/dL   CBC with platelets   Result Value Ref Range    WBC 40.0 (H) 4.0 - 11.0 10e9/L    RBC Count 2.68 (L) 3.8 - 5.2 10e12/L    Hemoglobin 7.4 (L) 11.7 - 15.7 g/dL    Hematocrit 24.5 (L) 35.0 - 47.0 %    MCV 91 78 - 100 fl    MCH 27.6 26.5 - 33.0 pg    MCHC 30.2 (L) 31.5 - 36.5 g/dL    RDW 19.8 (H) 10.0 - 15.0 %    Platelet Count 115 (L) 150 - 450 10e9/L   Comprehensive metabolic panel   Result Value Ref Range    Sodium 138 133 - 144 mmol/L    Potassium 4.7 3.4 - 5.3 mmol/L    Chloride 107 94 - 109 mmol/L    Carbon Dioxide 28 20 - 32 mmol/L    Anion Gap 3 3 - 14 mmol/L    Glucose 106 (H) 70 - 99 mg/dL    Urea Nitrogen 9 7 - 30 mg/dL    Creatinine 0.54 0.52 - 1.04 mg/dL    GFR Estimate 85 >60 mL/min/[1.73_m2]    GFR Estimate If Black >90 >60 mL/min/[1.73_m2]    Calcium 8.4 (L) 8.5 - 10.1 mg/dL    Bilirubin Total 0.5 0.2 - 1.3 mg/dL    Albumin 1.7 (L) 3.4 - 5.0 g/dL    Protein Total 4.7 (L) 6.8 - 8.8 g/dL    Alkaline Phosphatase 87 40 - 150 U/L    ALT 34 0 - 50 U/L    AST 59 (H) 0 - 45 U/L

## 2019-07-21 NOTE — PROGRESS NOTES
[facility]  Hospitalist Progress Note  Fide Wilkes MD 07/21/2019    Reason for Stay (Diagnosis): Multiple falls         Assessment and Plan:      Summary of Stay: Marie Kline is a 87 year old female with a history of CLL WBCs 200-300 range at presentation in 2017, WBC is now are in the ,000 range, history of takosubo cardiomyopathy with mild CAD by cath in 12/2018-echocardiogram at that time estimated ejection fraction at 30 to 35%, hypertension, COPD on chronic oxygen as needed, hypothyroidism admitted on 7/15/2019 after sustaining a mechanical fall with injury to her left shoulder leg and found to have a left shoulder fracture as well as an extensive skin tear.  She also has evidence of a scalp hematoma.    There was consulted and felt that the shoulder fracture is nonoperative and recommended conservative management measures.  During her brief hospitalization she continued to complain of pelvic buttocks pain prompting x-ray which showed evidence of a pelvic fracture.  Further CT imaging revealed bilateral comparison to the iliopubic versus medial superior inferior rami fractures adjacent to hematoma with mass-effect on the urinary bladder.  There was also a sacral alae fracture, left acetabular fracture and avulsion injury of the gluteal tendon attachment.    She remains in the hospital for mobility and placement issues as well as pain control and one with multiple injuries.    Problem List:   1. Fall: unclear mechanism, patient denies any dizziness/syncope but is unable to tell me the sequence of events-just that she landed on the steps.    2. Left shoulder fracture, multiple pelvic fractures. Conservative management per ortho Pain control will need placement.  3. Hematoma: Stable hemoglobin.  But with mass-effect and urinary bladder.  Has a Rees catheter in place.  4. Closed head injury with scalp hematoma-amnestic for some of the events surrounding the fall.    5. Fever:  Single temp to  103F on  7/19/19-empirically placed on meropenem  6. Acute on chronic anemia: Hemoglobin typically in the 9- 10 range, did drop to 6.6 but on recheck was in the mid 7 range.  She stable in the mid 7 range and I think this is all acute blood loss in the setting of a couple of hematomas.  There is been no indication for transfusion at this time.  Continue to monitor  7. History of Takotsubo cardiomyopathy: 12/2018, echo at that time showed EF of 30 to 35%, cath did not show significant coronary artery disease. No e/op decompensation. Cont pta regimen of BB/furosemide.  Telemetry shows NSR, consider echocardiogram  8. Hypertension:  Resumed metop xl 25 mg and furosemide 20 mg qd  9. COPD:  On prn O2 at home, rarely uses.  On continuous here-? If due to lack of diaphragmatic distension in the setting of suspected significant constipation.  States she takes her inhalers and nebs only prn at home.  She's getting them scheduled here.  10. Constipation:  Significant distension on exam, trial miralax every day/senna 3 tabs tonight, prn milk of mag.  AXR-consider enema/supp  11. Hyperglycemia: Mild no indication for further monitoring  12. CLL with a history of progressively elevated white cell counts.  Currently better than baseline at about 40.  Appreciate oncology input.  Also noted to be mildly thrombocytopenic at 115  13. Malnutrition suspected in setting of chronic medical illness now with acute medical process  DVT Prophylaxis: Not on PCD's due to large extremity skin tear, nor she on anticoagulation due to scalp and pelvic hematomas  Code Status: DNR / DNI  Functional Status: Prior to admission was living in independent living at [facility], now obviously in severe pain limited functional status will need AAR U versus TCU  Diet: Regular  Rees: In place due to some retention in the setting of mass-effect from pelvic hematoma    Disposition Plan   Expected discharge in unclear 2-3?  Days to transitional care unit once  multiple issues as stated above under better control.     Entered: Fide Wilkes 07/21/2019, 1:28 PM       Discussed plan with patient, and son and daughter who are at the bedside.        Interval History (Subjective):      Irritable with me, tired of telling the same story.  When I really press her on whether or not she passed out or fell I still do not get an accurate answer.  And almost certainly this represents a closed head injury with some amnesia in association with the event.  She has been on telemetry and is in NSR.  No BM for several days, feels very distended has poor p.o. but no nausea or vomiting.  Thinks her breathing is good denies chest pain still with significant elbow pain.                  Physical Exam:      Last Vital Signs:  /48   Pulse 87   Temp 95.4  F (35.2  C) (Oral)   Resp 20   Wt 44.9 kg (99 lb)   SpO2 91%   BMI 20.00 kg/m      I/O: Even  Weights: Not being tracked  Telemetry: NSR    Irritable and then pleasant with me she is cooperative but is frustrated head is traumatic with scalp hematoma but otherwise within normal limits.  Sclera are clear lungs are diminished in the bases but I do not hear any crackles or wheezing heart is distant but regular without murmurs rubs or gallops belly exam is significantly distended, bowel sounds are very hypoactive she is however nontender.  Skin is warm and dry and there is no cyanosis or clubbing of the extremities.  There is no lower extremity edema that I can determine otherwise her affect is appropriate she is alert and oriented but amnestic for the event that led to this hospitalization           Medications:      All current medications were reviewed with changes reflected in problem list.         Data:      All new lab and imaging data was reviewed.   Labs:  Recent Labs   Lab 07/21/19  0634      POTASSIUM 4.7   CHLORIDE 107   CO2 28   ANIONGAP 3   *   BUN 9   CR 0.54   GFRESTIMATED 85   GFRESTBLACK >90   CARLOS 8.4*      Recent Labs   Lab 07/21/19  0634   WBC 40.0*   HGB 7.4*   HCT 24.5*   MCV 91   *     Recent Labs   Lab 07/21/19  0634 07/20/19  0629 07/19/19  0548 07/18/19  0627 07/17/19  1233   * 124* 122* 103* 114*     Recent Labs   Lab 07/21/19  0634 07/20/19  0629 07/18/19  1401   AST 59* 14  --    ALT 34 14  --    ALKPHOS 87 86  --    BILITOTAL 0.5 0.6 1.3      Imaging:

## 2019-07-21 NOTE — PLAN OF CARE
Discharge Planner PT   Patient plan for discharge: TCU  Current status: transfer to commode and back to with Mod A for bed mobility and MaxA for transfer with no device.  Very painful with all movement Bere Steady maybe appropriate.  Barriers to return to prior living situation: mobility, pain  Recommendations for discharge: TCU per plan established by the PT.    Rationale for recommendations: if goals remain restorative TCU will be benefical for mobility needs.       Entered by: Cherise Soto 07/21/2019 12:00 PM

## 2019-07-21 NOTE — PLAN OF CARE
Pt alert and oriented. Up with A2 pivot. SLing in place, NWB LUE. Weak hand . Moderate swelling. Pain managed with oxycodone, tylenol. Given IV dilaudid prior to dressing changes. Dressing changed per plan of care. Lungs diminished. Infrequent productive cough, yellow phlegm. Requiring 2L oxygen Rees patent. Bowels active, +flatus. Constipation. Given Senna, miralax. Fleet enema ordered. Abdominal Xray. VSS

## 2019-07-22 ENCOUNTER — APPOINTMENT (OUTPATIENT)
Dept: PHYSICAL THERAPY | Facility: CLINIC | Age: 84
DRG: 562 | End: 2019-07-22
Payer: MEDICARE

## 2019-07-22 LAB
ANION GAP SERPL CALCULATED.3IONS-SCNC: 2 MMOL/L (ref 3–14)
ANISOCYTOSIS BLD QL SMEAR: ABNORMAL
BASOPHILS # BLD AUTO: 0 10E9/L (ref 0–0.2)
BASOPHILS NFR BLD AUTO: 0 %
BUN SERPL-MCNC: 13 MG/DL (ref 7–30)
CALCIUM SERPL-MCNC: 8.9 MG/DL (ref 8.5–10.1)
CHLORIDE SERPL-SCNC: 102 MMOL/L (ref 94–109)
CO2 SERPL-SCNC: 32 MMOL/L (ref 20–32)
CREAT SERPL-MCNC: 0.58 MG/DL (ref 0.52–1.04)
DIFFERENTIAL METHOD BLD: ABNORMAL
EOSINOPHIL # BLD AUTO: 0 10E9/L (ref 0–0.7)
EOSINOPHIL NFR BLD AUTO: 0 %
ERYTHROCYTE [DISTWIDTH] IN BLOOD BY AUTOMATED COUNT: 19.4 % (ref 10–15)
GFR SERPL CREATININE-BSD FRML MDRD: 83 ML/MIN/{1.73_M2}
GLUCOSE SERPL-MCNC: 108 MG/DL (ref 70–99)
HCT VFR BLD AUTO: 27.2 % (ref 35–47)
HGB BLD-MCNC: 8.2 G/DL (ref 11.7–15.7)
LYMPHOCYTES # BLD AUTO: 41.7 10E9/L (ref 0.8–5.3)
LYMPHOCYTES NFR BLD AUTO: 96 %
MCH RBC QN AUTO: 28 PG (ref 26.5–33)
MCHC RBC AUTO-ENTMCNC: 30.1 G/DL (ref 31.5–36.5)
MCV RBC AUTO: 93 FL (ref 78–100)
MONOCYTES # BLD AUTO: 0 10E9/L (ref 0–1.3)
MONOCYTES NFR BLD AUTO: 0 %
NEUTROPHILS # BLD AUTO: 1.7 10E9/L (ref 1.6–8.3)
NEUTROPHILS NFR BLD AUTO: 4 %
OVALOCYTES BLD QL SMEAR: SLIGHT
PLATELET # BLD AUTO: 181 10E9/L (ref 150–450)
PLATELET # BLD EST: ABNORMAL 10*3/UL
POTASSIUM SERPL-SCNC: 4.8 MMOL/L (ref 3.4–5.3)
RBC # BLD AUTO: 2.93 10E12/L (ref 3.8–5.2)
SODIUM SERPL-SCNC: 136 MMOL/L (ref 133–144)
WBC # BLD AUTO: 43.4 10E9/L (ref 4–11)

## 2019-07-22 PROCEDURE — 99233 SBSQ HOSP IP/OBS HIGH 50: CPT | Performed by: INTERNAL MEDICINE

## 2019-07-22 PROCEDURE — 25000132 ZZH RX MED GY IP 250 OP 250 PS 637: Mod: GY | Performed by: NURSE PRACTITIONER

## 2019-07-22 PROCEDURE — 25000132 ZZH RX MED GY IP 250 OP 250 PS 637: Mod: GY | Performed by: INTERNAL MEDICINE

## 2019-07-22 PROCEDURE — 40000274 ZZH STATISTIC RCP CONSULT EA 30 MIN

## 2019-07-22 PROCEDURE — 25000125 ZZHC RX 250: Performed by: INTERNAL MEDICINE

## 2019-07-22 PROCEDURE — 12000000 ZZH R&B MED SURG/OB

## 2019-07-22 PROCEDURE — 94640 AIRWAY INHALATION TREATMENT: CPT | Mod: 76

## 2019-07-22 PROCEDURE — 94640 AIRWAY INHALATION TREATMENT: CPT

## 2019-07-22 PROCEDURE — 97530 THERAPEUTIC ACTIVITIES: CPT | Mod: GP | Performed by: PHYSICAL THERAPY ASSISTANT

## 2019-07-22 PROCEDURE — 85025 COMPLETE CBC W/AUTO DIFF WBC: CPT | Performed by: INTERNAL MEDICINE

## 2019-07-22 PROCEDURE — 40000275 ZZH STATISTIC RCP TIME EA 10 MIN

## 2019-07-22 PROCEDURE — 80048 BASIC METABOLIC PNL TOTAL CA: CPT | Performed by: INTERNAL MEDICINE

## 2019-07-22 PROCEDURE — 36415 COLL VENOUS BLD VENIPUNCTURE: CPT | Performed by: INTERNAL MEDICINE

## 2019-07-22 PROCEDURE — 40000901 ZZH STATISTIC WOC PT EDUCATION, 0-15 MIN

## 2019-07-22 PROCEDURE — 25000128 H RX IP 250 OP 636: Performed by: INTERNAL MEDICINE

## 2019-07-22 RX ORDER — POLYETHYLENE GLYCOL 3350 17 G/17G
17 POWDER, FOR SOLUTION ORAL 2 TIMES DAILY
Status: DISCONTINUED | OUTPATIENT
Start: 2019-07-22 | End: 2019-07-24 | Stop reason: HOSPADM

## 2019-07-22 RX ADMIN — Medication 2.5 MG: at 10:21

## 2019-07-22 RX ADMIN — DICLOFENAC 2 G: 10 GEL TOPICAL at 10:00

## 2019-07-22 RX ADMIN — MEROPENEM 500 MG: 500 INJECTION, POWDER, FOR SOLUTION INTRAVENOUS at 01:31

## 2019-07-22 RX ADMIN — MEROPENEM 500 MG: 500 INJECTION, POWDER, FOR SOLUTION INTRAVENOUS at 16:55

## 2019-07-22 RX ADMIN — IPRATROPIUM BROMIDE AND ALBUTEROL SULFATE 3 ML: .5; 3 SOLUTION RESPIRATORY (INHALATION) at 15:39

## 2019-07-22 RX ADMIN — POLYETHYLENE GLYCOL 3350 17 G: 17 POWDER, FOR SOLUTION ORAL at 09:00

## 2019-07-22 RX ADMIN — ATORVASTATIN CALCIUM 20 MG: 20 TABLET, FILM COATED ORAL at 08:59

## 2019-07-22 RX ADMIN — SERTRALINE HYDROCHLORIDE 50 MG: 50 TABLET ORAL at 08:59

## 2019-07-22 RX ADMIN — ACETAMINOPHEN 1000 MG: 500 TABLET, FILM COATED ORAL at 13:06

## 2019-07-22 RX ADMIN — SENNOSIDES AND DOCUSATE SODIUM 1 TABLET: 8.6; 5 TABLET ORAL at 19:55

## 2019-07-22 RX ADMIN — IPRATROPIUM BROMIDE AND ALBUTEROL SULFATE 3 ML: .5; 3 SOLUTION RESPIRATORY (INHALATION) at 08:01

## 2019-07-22 RX ADMIN — MEROPENEM 500 MG: 500 INJECTION, POWDER, FOR SOLUTION INTRAVENOUS at 08:59

## 2019-07-22 RX ADMIN — GABAPENTIN 300 MG: 300 CAPSULE ORAL at 19:55

## 2019-07-22 RX ADMIN — LEVOTHYROXINE SODIUM 75 MCG: 75 TABLET ORAL at 08:59

## 2019-07-22 RX ADMIN — FLUTICASONE FUROATE AND VILANTEROL TRIFENATATE 1 PUFF: 100; 25 POWDER RESPIRATORY (INHALATION) at 08:02

## 2019-07-22 RX ADMIN — Medication 2.5 MG: at 13:06

## 2019-07-22 RX ADMIN — Medication 2.5 MG: at 05:12

## 2019-07-22 RX ADMIN — GABAPENTIN 300 MG: 300 CAPSULE ORAL at 08:59

## 2019-07-22 RX ADMIN — ACETAMINOPHEN 1000 MG: 500 TABLET, FILM COATED ORAL at 21:23

## 2019-07-22 RX ADMIN — FUROSEMIDE 20 MG: 20 TABLET ORAL at 08:59

## 2019-07-22 RX ADMIN — TRAZODONE HYDROCHLORIDE 100 MG: 50 TABLET ORAL at 21:23

## 2019-07-22 RX ADMIN — ALPRAZOLAM 0.25 MG: 0.25 TABLET ORAL at 21:22

## 2019-07-22 RX ADMIN — METOPROLOL SUCCINATE 25 MG: 25 TABLET, EXTENDED RELEASE ORAL at 08:59

## 2019-07-22 RX ADMIN — Medication 2.5 MG: at 16:55

## 2019-07-22 RX ADMIN — LIDOCAINE 2 PATCH: 560 PATCH PERCUTANEOUS; TOPICAL; TRANSDERMAL at 20:44

## 2019-07-22 RX ADMIN — DICLOFENAC 4 G: 10 GEL TOPICAL at 20:45

## 2019-07-22 RX ADMIN — Medication 2.5 MG: at 21:22

## 2019-07-22 RX ADMIN — ACETAMINOPHEN 1000 MG: 500 TABLET, FILM COATED ORAL at 05:12

## 2019-07-22 RX ADMIN — IPRATROPIUM BROMIDE AND ALBUTEROL SULFATE 3 ML: .5; 3 SOLUTION RESPIRATORY (INHALATION) at 20:18

## 2019-07-22 ASSESSMENT — ACTIVITIES OF DAILY LIVING (ADL)
ADLS_ACUITY_SCORE: 22
ADLS_ACUITY_SCORE: 22
ADLS_ACUITY_SCORE: 18
ADLS_ACUITY_SCORE: 20
ADLS_ACUITY_SCORE: 19
ADLS_ACUITY_SCORE: 18

## 2019-07-22 NOTE — PROGRESS NOTES
Focus: wound care orders  D: RN paged WOC to discuss use of MS gel to wound care.   I: discussion on POC  A/P: large skin tears to arm and leg.   POC:  remove old dressing, rinse with wound spray, apply MS gel with small amount of wound gel and cover all open areas with adaptic and ABD/ flexnet. .

## 2019-07-22 NOTE — PROGRESS NOTES
FIDEL     D: Discharge planning continuing.. Noted per MD the planning for pt's discharge to TCU 1-2 days, SW has left message for admissions coordinator at [facility] inquiring of bed availability.. pt and son updated of  contact as noted with [facility].

## 2019-07-22 NOTE — PROGRESS NOTES
[facility]  Hospitalist Progress Note  Fide Wilkes MD 07/22/2019    Reason for Stay (Diagnosis): Multiple falls         Assessment and Plan:      Summary of Stay: Marie Kline is a 87 year old female with a history of CLL WBCs 200-300 range at presentation in 2017, WBC is now are in the ,000 range, history of takosubo cardiomyopathy with mild CAD by cath in 12/2018-echocardiogram at that time estimated ejection fraction at 30 to 35%, hypertension, COPD on chronic oxygen as needed, hypothyroidism admitted on 7/15/2019 after sustaining a mechanical fall with injury to her left shoulder leg and found to have a left shoulder fracture as well as an extensive skin tear.  She also has evidence of a scalp hematoma.    There was consulted and felt that the shoulder fracture is nonoperative and recommended conservative management measures.  During her brief hospitalization she continued to complain of pelvic buttocks pain prompting x-ray which showed evidence of a pelvic fracture.  Further CT imaging revealed bilateral comparison to the iliopubic versus medial superior inferior rami fractures adjacent to hematoma with mass-effect on the urinary bladder.  There was also a sacral alae fracture, left acetabular fracture and avulsion injury of the gluteal tendon attachment.    She remains in the hospital for mobility and placement issues as well as pain control in one with multiple injuries.    Problem List:   1. Fall: unclear mechanism, patient denies any dizziness/syncope but is unable to tell me the sequence of events-just that she landed on the steps.    2. Left shoulder fracture, multiple pelvic fractures. Conservative management per ortho Pain control will need placement.  3. Pain control:  Definitely better than a few days ago per discussion with dtr and pain team.  Currently on ox 2.5 q6 hours but still with some breakthrough pain.  Apparently has had reactions to hydroxyzine in the past.  Has e/o of  constipation as well.  Discussed with pain team-plan:  Increase sched oxy to 2.5 mg qid, cont apap 1 gm tid, aggressive bowel regimen-will need placement  4. Hematoma: Stable hemoglobin.  But with mass-effect and urinary bladder.  Has a Rees catheter in place.  5. Closed head injury with scalp hematoma-amnestic for some of the events surrounding the fall.    6. Fever:  Single temp to 103F on  7/19/19-empirically placed on meropenem, UA negative, blood cultures remains NGTD, CXR with patchy RLL pna.  Will complete 7 days of abx, currently on day 4 of meropenem  7. Acute on chronic anemia: Hemoglobin typically in the 9- 10 range, did drop to 6.6 but on recheck was in the mid 7 range.  She stable in the mid 7 range and I think this is all acute blood loss in the setting of a couple of hematomas.  There is been no indication for transfusion at this time.  Continue to monitor  8. History of Takotsubo cardiomyopathy: 12/2018, echo at that time showed EF of 30 to 35%, cath did not show significant coronary artery disease. No e/op decompensation. Cont pta regimen of BB/furosemide.  Telemetry shows NSR, consider echocardiogram  9. Hypertension:  Resumed metop xl 25 mg and furosemide 20 mg qd  10. COPD:  On prn O2 at home, rarely uses.  On continuous here-? If due to lack of diaphragmatic distension in the setting of suspected significant constipation.  States she takes her inhalers and nebs only prn at home.  She's getting them scheduled here.  11. Constipation:  Evident on AXR, still protuberant but did have mod bm yesterday.  No abdominal pain and po intake is picking up   12. Hyperglycemia: Mild no indication for further monitoring  13. CLL with a history of progressively elevated white cell counts.  Currently better than baseline at about 40.  Appreciate oncology input.  Also noted to be mildly thrombocytopenic at 115  14. Malnutrition suspected in setting of chronic medical illness now with acute medical process  DVT  Prophylaxis: Not on PCD's due to large extremity skin tear, nor she on anticoagulation due to scalp and pelvic hematomas  Code Status: DNR / DNI  Functional Status: Prior to admission was living in independent living at [facility], now obviously in severe pain limited functional status will need AAR U versus TCU  Diet: Regular  Rees: In place due to some retention in the setting of mass-effect from pelvic hematoma    Disposition Plan   Expected discharge in unclear 1-2?  Days to transitional care unit once multiple issues as stated above under better control.     Entered: Fide Wilkes 07/22/2019, 1:52 PM       Discussed plan with patient, and son and daughter who are at the bedside.         Interval History (Subjective):      Hungry today, no cp, thinks breathing is fine, still with quite a bit of pain especially with any movement. No n/v, and appetite has significantly picked up.                   Physical Exam:      Last Vital Signs:  /52   Pulse 95   Temp 97.6  F (36.4  C)   Resp 16   Wt 44.9 kg (99 lb)   SpO2 95%   BMI 20.00 kg/m      I/O: Even  Weights: Not being tracked  Telemetry: NSR    Less irritable for me, makes her memory loss more apparent,  head is traumatic with scalp hematoma but otherwise within normal limits.  Sclera are clear lungs are diminished in the bases but I do not hear any crackles or wheezing heart is distant but regular without murmurs rubs or gallops belly exam is significantly distended, bowel sounds are very hypoactive she is however nontender.  Skin is warm and dry and there is no cyanosis or clubbing of the extremities.  There is no lower extremity edema  otherwise her affect is appropriate she is alert and oriented but amnestic for the event that led to this hospitalization           Medications:      All current medications were reviewed with changes reflected in problem list.         Data:      All new lab and imaging data was reviewed.   Labs:  Recent Labs   Lab  07/22/19  0615      POTASSIUM 4.8   CHLORIDE 102   CO2 32   ANIONGAP 2*   *   BUN 13   CR 0.58   GFRESTIMATED 83   GFRESTBLACK >90   CARLOS 8.9     Recent Labs   Lab 07/22/19  0615   WBC 43.4*   HGB 8.2*   HCT 27.2*   MCV 93        Recent Labs   Lab 07/22/19  0615 07/21/19  0634 07/20/19  0629 07/19/19  0548 07/18/19  0627   * 106* 124* 122* 103*     Recent Labs   Lab 07/21/19  0634 07/20/19  0629 07/18/19  1401   AST 59* 14  --    ALT 34 14  --    ALKPHOS 87 86  --    BILITOTAL 0.5 0.6 1.3      Imaging:

## 2019-07-22 NOTE — PLAN OF CARE
A&O x4. On tele. Up w/Ax2, did not get up overnight, slept well. LUE in sling, NWB. BLE WBAT. On 2L O2 via NC. BS/flatus+. Had a soft BM this am. Regular diet. Rees draining adequate amts. Saline locked, given merrem. Pain managed w/scheduled oxycodone and Tylenol.

## 2019-07-22 NOTE — PLAN OF CARE
VSS. A and Ox4, but forgetful. Managing pain with medications, have been premedicating prior to turns/incontinence episode. Regular diet. 2L NCA2. Tele Tachy. Takes meds with applesauce. Cough has been production, yellow/brown colored. Will continue to monitor.

## 2019-07-22 NOTE — PLAN OF CARE
Alert, orientated. Mild forgetful, anxious at times. Family at bedside. Vitals stable. Oxygen decreased to 2L per Nc, chronic oxygen. Lungs diminished mainly on left lobes. Infrequent, congested cough, green to brown thick sputum. On merrum. Saline locked. Food and fluids well. Folay in place, lasix. Edema to silvio area and bilateral hips, and left shoulder moderate. Unable to lift either leg off bed per self. Moderate strength to ankles. Wound cares will be performed on the skin tears. Staples to back of head. No drainage. Up in chair with abad steady 2 max assist for 1 hour. Plan is TCU at discharge. Oxycodone for pain, able to rest between cares with eyes closed. Remote tele. NSR per tele tech. Sling in place. Bed alarm on for safety.

## 2019-07-22 NOTE — PROGRESS NOTES
[facility]  Pain Management Progress Note  Text Page     Assessment & Plan   Marie Kline is a 87 year old female who was admitted on 7/15/2019. I was asked by Dr. Vazquez to see the patient for acute on chronic pain management.     1)  Acute on chronic hip and back pain s/p mechanical fall and findings of left arm fracture, pelvic fractures and occipital scalp contusion without intracranial pathology.     2)  Patient with chronic low back and left hip pain, not on chronic opioid medication.  History of tolerating tramadol.  Baseline 0mg Daily Morphine Equivalent.  Patient has no expected opioid tolerance.      Patient's opioid use in past 24 hours: oxycodone 10 mg PO, dilaudid 0.6 mg IV =  27 mg Daily Morphine Equivalent     3)  Risk factors for opioid related harms  - History of Renal insufficiency  - Age > 65 years old  - Anxiety/depression     4)  Opioid induced side-effects:  -Constipation - intermittent in the past when on opiate therapy, currently constipated, enema yesterday with results.  -Nausea/Vomit - resolved.  -Sedation - has experienced sedation with opiates in the past.    -Urinary Retention - none reported.     5)  Other/Related:    - Depression/anxiety - intermittent anxiety, home medication of xanax 0.25 mg bid prn.  - COPD on home oxygen - places patient at high risk for complications secondary to opiates.  - Lethargy, no definite infectious source, but on empiric antibiotics.    6) Palliative Consult - patient's chronic medical conditions with new fractures and significant pain/immobility has placed her at high risk for complications.  Patient DNR/DNI with restorative goals including return to her previous living situation.    There is no advanced directive on file, patient's adult children will jointly serve as next of kin for decision making.  There is a POLST on file indicating limited trials of mechanical intubation and artificial nutrition would be acceptable if used as a  bridge to recovery to reasonable quality of life.  Patient with improved mobility and orientation. Plans to discharge to facility for rehab.  Patient hopes to return back to [facility], but her children are wondering if she will need to move to an assisted living room.  Children plan on talking with [facility] work at [facility] for further planning.     PLAN:   1)  Continue IV dilaudid for severe/breakthrough pain.   2)  schedule oxycodone 2.5 mg q 4 hours - hold for significant sedation.  3) advance bowel regimen to include: Senna-docusate 2 tabs twice daily, miralax packet twice daily.   4) Non-opioid multimodal medication therapy  -Topical: Voltaren 1% Topical Gel three times daily to bilateral hips, left arm. Lidocaine Patch 4% apply every evening to left hip and arm.  -N-SAIDS:Avoid due to recent trauma.  -Muscle Relaxants:None indicated  -Adjuvants:Acetaminophen 1000 mg three times daily scheduled  -Antidepresants/anxiolytics: alprazolam 0.25 mg twice daily (home dose)  5)  Non-medication interventions  Positioning, ICE, Relaxation, Distraction with visitors, TV, Essential oils per nursing, Physical therapy as ordered.  6)  Opioids: Pain tolerable when not moving, 10/10 severe pain with movement as minimal as repositioning.  Unable to participate in therapy due to pain.  Pain primarily in bilateral hips, groin and left arm.  Encouraged patient to request PRN medications when due so that pain is maintained at tolerable levels.  Patient with minimal pain medications yesterday and continues to endorse significant pain, does not seem to remember to request pain medications.    - oxycodone 2.5 mg PO q 4 hours scheduled.  - oxycodone 2.5 - 5mg PO q 4 hours prn for severe pain.  - dilaudid 0.2 mg IV q 2 hours prn.  Opioids Treatment Goal:   -Improvement in function  -Participate in PT  -Management of acute pain during hospitalization, expected prolonged need for opioids after DC due to traumatic injury.  7)   Constipation Prophylaxis  - Senna-S 2 tablets twice daily.  - Miralax, 1 packet two times daily.  - encourage fluids.  - encourage activity/OOB three times daily.  8)  Pain Education  -Opioid safe use, storage and disposal information included in DC AVS  - Request pain medications on regular basis to maintain tolerable pain levels and avoid extremes in pain sensation.  8)  DC Planning   Discussed goal of Opioid therapy as above with  patient's family and hospitalist.  Continued outpatient management of pain per rehabilitation facility team.  Disposition: TCU in 1-2 days. Referrals pending.  Support systems: patient's family involved.  Daughter and Son in town.  Outpatient Referrals: none indicated.  The following risk factors have been identified for unintentional overdose: patient is > 65 years old, patient has anxiety, depression or PTSD, patient has been switched to a new opioid medication and patient has lung disease. Discharge with intra-nasal naloxone if discharged to home with opioids  >40 mg MME/day.  Plan for education prior to discharge     Na SONG, CNP  Pain Management and Palliative Care  [facility]  Pgr: [phone]    Time Spent on this Encounter   Total unit/floor time 30 minutes (7495 - 1411), time consisted of the following, examination of the patient, reviewing the record and completing documentation. >50% of time spent in counseling and coordination of care.  Time spend counseling with family consisted of the following topics, goals of care and symptom management.  Time spent in coordination of care with Bedside Nurse , and Hospitalist Dr. Wilkes.     Interval History   Patient with improved pain control.  More alert and able to get out of bed with assist and lift.      Discussed plan of care with patient, her daughter and her son.  Daughter states that the patient has said she will work through this and that she feels she is improved since last week.  Daughter  acknowledges that patient still looks like she has a lot to recover from and that she and her brother are concerned that the patient will not get back to prior living situation.  Reiterated goals to mobilize and promote therapy, adequate pain management and lung exercises to avoid complications from inactivity such as pneumonia or blood clots. Patient and her children verbalized understanding.    7/19: Discussed plan of care with patient's daughter.  Emphasized that the patient's medical condition does not look favorable and that likelihood of successful recovery and return to previous level of function is low.  Talked about Marie's personality as a 'go getter', strong willed nature and that she had previously wanted to be moderately aggressive with cares when hospitalized for COPD related issues.  Spoke about the compounded impact on Marie at this time of the significant pain, subsequent immobility and the baseline decreased lung capability due to her COPD.  Margaret, Marie's daughter verbalized understanding of the difficult medical situation.  Educated her about the circumstance that Marie faces, the difficult balance between pain control and mental status along with the detriment to Marie's lung function while she is lying in bed due to pain and the complications that the medical team is concerned with.  States that she would prefer to have her mother awake to talk with her, but in the event that she needs to be more sleepy in order to have her pain needs met she would be ok with that situation.  Margaret expressed understanding of the difficult situation, states she is overwhelmed by this information and her mother's change in status today.  Stated her brother is coming into town to be with her.  Stated her  is out of town this weekend.      Review of Systems   Negative review of systems other than what is mentioned above.    Physical Exam   Temp:  [95.4  F (35.2  C)-97.7  F (36.5  C)] 97.6  F (36.4   C)  Pulse:  [95] 95  Heart Rate:  [83-89] 84  Resp:  [16-20] 18  BP: (126-144)/(46-52) 128/52  SpO2:  [91 %-99 %] 97 %  99 lbs 0 oz  GEN:  Lethargic, arouses to voice, oriented to self, location and date, appears comfortable, NAD.  HEENT:  Normocephalic/atraumatic, no scleral icterus, no nasal discharge, mouth moist.  CV:  RRR, S1, S2; no murmurs or other irregularities noted.  +3 DP/PT pulses bilatererally; no edema BLE.  RESP:  Clear to auscultation bilaterally without rales/rhonchi/wheezing/retractions.  Symmetric chest rise on inhalation noted.  Normal respiratory effort.  ABD:  Rounded, soft, non-tender, mild distended.  +BS  EXT:  Edema & pulses as noted above.  CMS intact x 4.     M/S:   n/a.    SKIN:  Dry to touch, no exanthems noted in the visualized areas.    NEURO: Symmetric strength +5/5.  Sensation to touch intact all extremities.   There is no area of allodynia or hyperesthesia.  PAIN BEHAVIOR: ZACHARY  Psych:  ZACHARY    Medications     - MEDICATION INSTRUCTIONS -         acetaminophen  1,000 mg Oral Q8H     ALPRAZolam  0.25 mg Oral At Bedtime     atorvastatin  20 mg Oral Daily     diclofenac  2-4 g Transdermal TID     fluticasone-vilanterol  1 puff Inhalation Daily     furosemide  20 mg Oral Daily     gabapentin  300 mg Oral BID     ipratropium - albuterol 0.5 mg/2.5 mg/3 mL  3 mL Nebulization 4x daily     levothyroxine  75 mcg Oral Daily     lidocaine  2 patch Transdermal Q24h    And     lidocaine   Transdermal Q24H    And     lidocaine   Transdermal Q8H     meropenem  500 mg Intravenous Q8H     metoprolol succinate ER  25 mg Oral Daily     oxyCODONE  2.5 mg Oral Q6H     polyethylene glycol  17 g Oral Daily     senna-docusate  1 tablet Oral BID    Or     senna-docusate  2 tablet Oral BID     senna-docusate  3 tablet Oral At Bedtime     sertraline  50 mg Oral Daily     sodium chloride (PF)  3 mL Intracatheter Q8H     traZODone  100 mg Oral At Bedtime       Data   Recent Labs   Lab 07/22/19  4302  07/21/19  0634 07/20/19  2159  07/20/19  1205 07/20/19  0629   WBC 43.4* 40.0*  --   --   --  65.2*   HGB 8.2* 7.4* 7.5*   < >  --  7.1*   MCV 93 91  --   --   --  90    115*  --   --   --  102*    138  --   --  137 136   POTASSIUM 4.8 4.7  --   --   --  4.2   CHLORIDE 102 107  --   --   --  104   CO2 32 28  --   --   --  29   BUN 13 9  --   --   --  9   CR 0.58 0.54  --   --   --  0.58   ANIONGAP 2* 3  --   --   --  3   CARLOS 8.9 8.4*  --   --   --  8.7   * 106*  --   --   --  124*   ALBUMIN  --  1.7*  --   --   --  1.9*   PROTTOTAL  --  4.7*  --   --   --  5.3*   BILITOTAL  --  0.5  --   --   --  0.6   ALKPHOS  --  87  --   --   --  86   ALT  --  34  --   --   --  14   AST  --  59*  --   --   --  14    < > = values in this interval not displayed.

## 2019-07-22 NOTE — PLAN OF CARE
Discharge Planner PT   Patient plan for discharge: TCU  Current status:  PT - Pt in bed upon arrival. Pt transfers supine to sit with mod assist of 2 for trunk and min assist of 1 for B LEs.  Pt required mod assist of 2 for scooting to EOB with draw sheet.  Pt transfers sit to/from stand with mod assist of 2 with assist for B hip extension and vcs for looking straight ahead.  Pt transfers bed to chair with use of Bere steady with mod assist of 2 with sit to/from stand.  Pillows placed under B UEs for support.   Barriers to return to prior living situation: mobility, pain  Recommendations for discharge: TCU per plan established by the PT.    Rationale for recommendations: if goals remain restorative TCU will be benefical for mobility needs.       Entered by: Jessica German 07/22/2019 10:56 AM

## 2019-07-22 NOTE — PROGRESS NOTES
"[facility] RCAT     Date: 7/22/19  Admission Dx: shoulder fracture  Pulmonary History: COPD  Home Nebulizer/MDI Use: Albuterol Q6 prn, Dulera BID  Home Oxygen: O2 as needed  Acuity Level (RCAT flow sheet): 3  Aerosol Therapy initiated: Duoneb QID, Albuterol Q6 prn, Breo QD      Pulmonary Hygiene initiated: Deep breathe and cough- TID      Volume Expansion initiated: IS- per nursing protocol      Current Oxygen Requirements: 2L nasal cannula  Current SpO2: 95%    Re-evaluation date: 7/25/19    Patient Education: Explained RCAT process to patient.      See \"RT Assessments\" flow sheet for patient assessment scoring and Acuity Level Details.             "

## 2019-07-22 NOTE — CONSULTS
"CLINICAL NUTRITION SERVICES  -  ASSESSMENT NOTE      MALNUTRITION:  % Weight Loss:  None noted  % Intake:  </= 75% for >/= 1 month (severe malnutrition)  Subcutaneous Fat Loss:  Orbital region moderate depletion and Upper arm region moderate depletion  Muscle Loss:  Temporal region severe depletion, Clavicle bone region severe depletion, Acromion bone region severe depletion and Dorsal hand region severe depletion  Fluid Retention:  None noted    Malnutrition Diagnosis: Severe malnutrition  In Context of:  Chronic illness or disease        REASON FOR ASSESSMENT  Marie Kline is a 87 year old female seen by Registered Dietitian for LOS.      NUTRITION HISTORY  - Information obtained from patient, family in room, and chart.  - Patient with a h/o COPD, cardiomyopathy, leukemia, bladder CA, CKD, \"malnutrition\".  Admitted after fall at senior living facility (lives independently).    - Recently assessed by MATHIEU LEGER in 5/2019:      \"Typical food/fluid intake PTA: decreased for 2+ months    2 months ago, upper teeth fell out as a medication side effect. Upper plate was fitted for one week, but due to severity of illness over the last 2+ months, no longer fit after one week and unable to wear currently despite being adjusted since. Relying on puddings, milk, ice cream, oatmeal, cream of wheat donuts, soft toast, coffee and 2 Boost/Ensure shakes mixed with ice cream per day.    Living situation: [facility], receives 20 meals per month and attending recently only if soft foods are offered    Meal preparation and grocery shopping: cub delivery or family assists    Factors affecting nutrition intake include: chewing difficulty, decreased appetite, early satiety\"    - Today patient/family describe that her appetite has still been poor, but that she thinks it has improved, only slightly.  She has been attempting to consistently drink Boost.  Much of conversation focuses on food she has been receiving in the hospital.  - NKFA.     CURRENT " "NUTRITION ORDERS  Diet Order:     Regular  Boost shakes BID between meals    Current Intake/Tolerance:  Pain, lethargy, and decreased appetite impacting PO trends.  % meal consumption.  Suspect continues to meet <75% needs.    NUTRITION FOCUSED PHYSICAL ASSESSMENT FOR DIAGNOSING MALNUTRITION)  Completed:  Yes Partial assessment --> declined exam of LEs today         Observed:    Muscle wasting (refer to documentation in Malnutrition section) and Subcutaneous fat loss (refer to documentation in Malnutrition section)    Obtained from Chart/Interdisciplinary Team:  Ortho following given L proximal humerus fracture --> conservative treatment until outpatient f/u  WOCN following for skin tears sustained during fall    ANTHROPOMETRICS  Height: 4' 11\"  Weight: 44.9 kg (99#)  Body mass index is 20 kg/m .  Weight Status:  Normal BMI --> low BMI for age  IBW: 44.5 kg (98#)  % IBW: 100%  Weight History:  Wt Readings from Last 10 Encounters:   07/16/19 44.9 kg (99 lb)   06/25/19 46.3 kg (102 lb)   06/18/19 41.6 kg (91 lb 11.2 oz)   05/20/19 45.3 kg (99 lb 12.8 oz)   05/15/19 45.3 kg (99 lb 12.8 oz)   05/07/19 42.2 kg (93 lb)   01/14/19 47.1 kg (103 lb 13.4 oz)   12/19/18 46.5 kg (102 lb 8 oz)   12/10/18 48.3 kg (106 lb 6.4 oz)   11/29/18 54.4 kg (120 lb)   - Wt stable since at least ~1/2019.  Chronic fat/muscle loss.    LABS  Labs reviewed    MEDICATIONS  Medications reviewed      ASSESSED NUTRITION NEEDS PER APPROVED PRACTICE GUIDELINES:    Dosing Weight 44. 9 kg   Estimated Energy Needs: 7991-5313+ kcals (30-35+ Kcal/Kg)  Justification: minimum repletion needs  Estimated Protein Needs: 67-90 grams protein (1.5-2 g pro/Kg)  Justification: Repletion and preservation of lean body mass  Estimated Fluid Needs: per MD    MALNUTRITION:  % Weight Loss:  None noted  % Intake:  </= 75% for >/= 1 month (severe malnutrition)  Subcutaneous Fat Loss:  Orbital region moderate depletion and Upper arm region moderate depletion  Muscle " Loss:  Temporal region severe depletion, Clavicle bone region severe depletion, Acromion bone region severe depletion and Dorsal hand region severe depletion  Fluid Retention:  None noted    Malnutrition Diagnosis: Severe malnutrition  In Context of:  Chronic illness or disease    NUTRITION DIAGNOSIS:  Malnutrition related to chronic fat/muscle loss with decreased appetite for a period of months as evidenced by continues to meet <75% needs with overt fat/muscle loss.      NUTRITION INTERVENTIONS  Recommendations / Nutrition Prescription  Continue regular diet order.  Patient verbalizes ability to self-select high calorie and high protein options.    Continue Boost shakes as ordered.      Implementation  Nutrition education: Provided education on high calorie/high protein diet.  Patient able to verbalize recommendations already.      Nutrition Goals  Patient to consume at least 50-75% of meals TID + 1-2 supplements daily.      MONITORING AND EVALUATION:  Progress towards goals will be monitored and evaluated per protocol and Practice Guidelines          Casandra Hennessy RD, LD  Clinical Dietitian  3rd floor/ICU: [phone]  All other floors: [phone]  Weekend/holiday: [phone]

## 2019-07-23 ENCOUNTER — APPOINTMENT (OUTPATIENT)
Dept: PHYSICAL THERAPY | Facility: CLINIC | Age: 84
DRG: 562 | End: 2019-07-23
Payer: MEDICARE

## 2019-07-23 LAB
ANION GAP SERPL CALCULATED.3IONS-SCNC: 2 MMOL/L (ref 3–14)
ANISOCYTOSIS BLD QL SMEAR: ABNORMAL
BASOPHILS # BLD AUTO: 0 10E9/L (ref 0–0.2)
BASOPHILS NFR BLD AUTO: 0 %
BUN SERPL-MCNC: 14 MG/DL (ref 7–30)
CALCIUM SERPL-MCNC: 8.8 MG/DL (ref 8.5–10.1)
CHLORIDE SERPL-SCNC: 98 MMOL/L (ref 94–109)
CO2 SERPL-SCNC: 35 MMOL/L (ref 20–32)
CREAT SERPL-MCNC: 0.53 MG/DL (ref 0.52–1.04)
DIFFERENTIAL METHOD BLD: ABNORMAL
EOSINOPHIL # BLD AUTO: 0 10E9/L (ref 0–0.7)
EOSINOPHIL NFR BLD AUTO: 0 %
ERYTHROCYTE [DISTWIDTH] IN BLOOD BY AUTOMATED COUNT: 19.1 % (ref 10–15)
GFR SERPL CREATININE-BSD FRML MDRD: 85 ML/MIN/{1.73_M2}
GLUCOSE SERPL-MCNC: 95 MG/DL (ref 70–99)
HCT VFR BLD AUTO: 28.6 % (ref 35–47)
HGB BLD-MCNC: 8.5 G/DL (ref 11.7–15.7)
LYMPHOCYTES # BLD AUTO: 40.2 10E9/L (ref 0.8–5.3)
LYMPHOCYTES NFR BLD AUTO: 86 %
MCH RBC QN AUTO: 27.6 PG (ref 26.5–33)
MCHC RBC AUTO-ENTMCNC: 29.7 G/DL (ref 31.5–36.5)
MCV RBC AUTO: 93 FL (ref 78–100)
MONOCYTES # BLD AUTO: 0 10E9/L (ref 0–1.3)
MONOCYTES NFR BLD AUTO: 0 %
NEUTROPHILS # BLD AUTO: 6.5 10E9/L (ref 1.6–8.3)
NEUTROPHILS NFR BLD AUTO: 14 %
OVALOCYTES BLD QL SMEAR: SLIGHT
PLATELET # BLD AUTO: 196 10E9/L (ref 150–450)
PLATELET # BLD EST: ABNORMAL 10*3/UL
POTASSIUM SERPL-SCNC: 4.8 MMOL/L (ref 3.4–5.3)
RBC # BLD AUTO: 3.08 10E12/L (ref 3.8–5.2)
SODIUM SERPL-SCNC: 135 MMOL/L (ref 133–144)
WBC # BLD AUTO: 46.7 10E9/L (ref 4–11)

## 2019-07-23 PROCEDURE — 36415 COLL VENOUS BLD VENIPUNCTURE: CPT | Performed by: INTERNAL MEDICINE

## 2019-07-23 PROCEDURE — 85025 COMPLETE CBC W/AUTO DIFF WBC: CPT | Performed by: INTERNAL MEDICINE

## 2019-07-23 PROCEDURE — 25000132 ZZH RX MED GY IP 250 OP 250 PS 637: Mod: GY | Performed by: INTERNAL MEDICINE

## 2019-07-23 PROCEDURE — 40000275 ZZH STATISTIC RCP TIME EA 10 MIN

## 2019-07-23 PROCEDURE — 94640 AIRWAY INHALATION TREATMENT: CPT

## 2019-07-23 PROCEDURE — 80048 BASIC METABOLIC PNL TOTAL CA: CPT | Performed by: INTERNAL MEDICINE

## 2019-07-23 PROCEDURE — 99232 SBSQ HOSP IP/OBS MODERATE 35: CPT | Performed by: NURSE PRACTITIONER

## 2019-07-23 PROCEDURE — 94640 AIRWAY INHALATION TREATMENT: CPT | Mod: 76

## 2019-07-23 PROCEDURE — 25000125 ZZHC RX 250: Performed by: INTERNAL MEDICINE

## 2019-07-23 PROCEDURE — 25000132 ZZH RX MED GY IP 250 OP 250 PS 637: Mod: GY | Performed by: NURSE PRACTITIONER

## 2019-07-23 PROCEDURE — 25000128 H RX IP 250 OP 636: Performed by: INTERNAL MEDICINE

## 2019-07-23 PROCEDURE — 99232 SBSQ HOSP IP/OBS MODERATE 35: CPT | Performed by: INTERNAL MEDICINE

## 2019-07-23 PROCEDURE — G0463 HOSPITAL OUTPT CLINIC VISIT: HCPCS

## 2019-07-23 PROCEDURE — 97530 THERAPEUTIC ACTIVITIES: CPT | Mod: GP | Performed by: PHYSICAL THERAPY ASSISTANT

## 2019-07-23 PROCEDURE — 12000000 ZZH R&B MED SURG/OB

## 2019-07-23 RX ADMIN — Medication 2.5 MG: at 05:11

## 2019-07-23 RX ADMIN — DICLOFENAC 2 G: 10 GEL TOPICAL at 09:40

## 2019-07-23 RX ADMIN — METOPROLOL SUCCINATE 25 MG: 25 TABLET, EXTENDED RELEASE ORAL at 09:38

## 2019-07-23 RX ADMIN — IPRATROPIUM BROMIDE AND ALBUTEROL SULFATE 3 ML: .5; 3 SOLUTION RESPIRATORY (INHALATION) at 08:09

## 2019-07-23 RX ADMIN — GABAPENTIN 300 MG: 300 CAPSULE ORAL at 19:20

## 2019-07-23 RX ADMIN — Medication 2.5 MG: at 13:02

## 2019-07-23 RX ADMIN — FLUTICASONE FUROATE AND VILANTEROL TRIFENATATE 1 PUFF: 100; 25 POWDER RESPIRATORY (INHALATION) at 08:09

## 2019-07-23 RX ADMIN — Medication 2.5 MG: at 01:11

## 2019-07-23 RX ADMIN — ATORVASTATIN CALCIUM 20 MG: 20 TABLET, FILM COATED ORAL at 09:38

## 2019-07-23 RX ADMIN — IPRATROPIUM BROMIDE AND ALBUTEROL SULFATE 3 ML: .5; 3 SOLUTION RESPIRATORY (INHALATION) at 11:49

## 2019-07-23 RX ADMIN — MEROPENEM 500 MG: 500 INJECTION, POWDER, FOR SOLUTION INTRAVENOUS at 18:03

## 2019-07-23 RX ADMIN — ALPRAZOLAM 0.25 MG: 0.25 TABLET ORAL at 08:42

## 2019-07-23 RX ADMIN — FUROSEMIDE 20 MG: 20 TABLET ORAL at 09:38

## 2019-07-23 RX ADMIN — Medication 2.5 MG: at 08:42

## 2019-07-23 RX ADMIN — LEVOTHYROXINE SODIUM 75 MCG: 75 TABLET ORAL at 09:38

## 2019-07-23 RX ADMIN — GABAPENTIN 300 MG: 300 CAPSULE ORAL at 09:38

## 2019-07-23 RX ADMIN — Medication 2.5 MG: at 17:32

## 2019-07-23 RX ADMIN — IPRATROPIUM BROMIDE AND ALBUTEROL SULFATE 3 ML: .5; 3 SOLUTION RESPIRATORY (INHALATION) at 15:59

## 2019-07-23 RX ADMIN — MEROPENEM 500 MG: 500 INJECTION, POWDER, FOR SOLUTION INTRAVENOUS at 09:38

## 2019-07-23 RX ADMIN — ACETAMINOPHEN 1000 MG: 500 TABLET, FILM COATED ORAL at 05:11

## 2019-07-23 RX ADMIN — MEROPENEM 500 MG: 500 INJECTION, POWDER, FOR SOLUTION INTRAVENOUS at 01:12

## 2019-07-23 RX ADMIN — ALPRAZOLAM 0.25 MG: 0.25 TABLET ORAL at 19:20

## 2019-07-23 RX ADMIN — ACETAMINOPHEN 1000 MG: 500 TABLET, FILM COATED ORAL at 13:02

## 2019-07-23 RX ADMIN — SERTRALINE HYDROCHLORIDE 50 MG: 50 TABLET ORAL at 09:38

## 2019-07-23 ASSESSMENT — ACTIVITIES OF DAILY LIVING (ADL)
ADLS_ACUITY_SCORE: 20
ADLS_ACUITY_SCORE: 18
ADLS_ACUITY_SCORE: 20
ADLS_ACUITY_SCORE: 18
ADLS_ACUITY_SCORE: 20
ADLS_ACUITY_SCORE: 20

## 2019-07-23 NOTE — PROGRESS NOTES
[facility]  Hospitalist Progress Note  Fide Wilkes MD 07/23/2019    Reason for Stay (Diagnosis): Multiple falls         Assessment and Plan:      Summary of Stay: Marie Kline is a 87 year old female with a history of CLL WBCs 200-300 range at presentation in 2017, WBC is now are in the ,000 range, history of takosubo cardiomyopathy with mild CAD by cath in 12/2018-echocardiogram at that time estimated ejection fraction at 30 to 35%, hypertension, COPD on chronic oxygen as needed, hypothyroidism admitted on 7/15/2019 after sustaining a mechanical fall with injury to her left shoulder leg and found to have a left shoulder fracture as well as an extensive skin tear.  She also has evidence of a scalp hematoma.    There was consulted and felt that the shoulder fracture is nonoperative and recommended conservative management measures.  During her brief hospitalization she continued to complain of pelvic buttocks pain prompting x-ray which showed evidence of a pelvic fracture.  Further CT imaging revealed bilateral comparison to the iliopubic versus medial superior inferior rami fractures adjacent to hematoma with mass-effect on the urinary bladder.  There was also a sacral alae fracture, left acetabular fracture and avulsion injury of the gluteal tendon attachment.    She remains in the hospital for mobility and placement issues as well as pain control in one with multiple injuries.    Problem List:   1. Fall: unclear mechanism, patient denies any dizziness/syncope but is unable to tell me the sequence of events-just that she landed on the steps.    2. Left shoulder fracture, multiple pelvic fractures. Conservative management per ortho Pain control will need placement.  3. Pain control:  Definitely better than a few days ago per discussion with dtr and pain team.  Currently on ox 2.5 q6 hours but still with some breakthrough pain.  Apparently has had reactions to hydroxyzine in the past.  Has e/o of  constipation as well.  Discussed with pain team-plan:  Increase sched oxy to 2.5 mg qid, cont apap 1 gm tid, aggressive bowel regimen-will need placement  4. Hematoma: Stable hemoglobin.  But with mass-effect and urinary bladder.  Has a Rees catheter in place.  5. Closed head injury with scalp hematoma-amnestic for some of the events surrounding the fall.    6. Fever:  Single temp to 103F on  7/19/19-empirically placed on meropenem, UA negative, blood cultures remains NGTD, CXR with patchy RLL pna.  Will complete 7 days of abx, currently on day 4 of meropenem  7. Acute on chronic anemia: Hemoglobin typically in the 9- 10 range, did drop to 6.6 but on recheck was in the mid 7 range.  She stable in the mid 7 range and I think this is all acute blood loss in the setting of a couple of hematomas.  There is been no indication for transfusion at this time.  Continue to monitor  8. History of Takotsubo cardiomyopathy: 12/2018, echo at that time showed EF of 30 to 35%, cath did not show significant coronary artery disease. No e/op decompensation. Cont pta regimen of BB/furosemide.  Telemetry shows NSR, consider echocardiogram  9. Hypertension:  Resumed metop xl 25 mg and furosemide 20 mg qd  10. COPD:  On prn O2 at home, rarely uses.  On continuous here-? If due to lack of diaphragmatic distension in the setting of suspected significant constipation.  States she takes her inhalers and nebs only prn at home.  She's getting them scheduled here.  11. Constipation:  Evident on AXR, still protuberant but did have mod bm yesterday.  No abdominal pain and po intake is picking up   12. Hyperglycemia: Mild no indication for further monitoring  13. CLL with a history of progressively elevated white cell counts.  Currently better than baseline at about 40.  Appreciate oncology input.  Also noted to be mildly thrombocytopenic at 115  14. Malnutrition suspected in setting of chronic medical illness now with acute medical process  DVT  Prophylaxis: Not on PCD's due to large extremity skin tear, nor she on anticoagulation due to scalp and pelvic hematomas  Code Status: DNR / DNI  Functional Status: Prior to admission was living in independent living at [facility], now obviously in severe pain limited functional status will need AAR U versus TCU  Diet: Regular  Rees: In place due to some retention in the setting of mass-effect from pelvic hematoma    Disposition Plan   Expected discharge in unclear 1-2?  Days to transitional care unit once multiple issues as stated above under better control.     Entered: Fide Wilkes 07/23/2019, 4:23 PM       Discussed plan with patient, and son and daughter who are at the bedside.         Interval History (Subjective):      Hungry today, no cp, thinks breathing is fine, still with quite a bit of pain especially with any movement. No n/v, and appetite has significantly picked up.                   Physical Exam:      Last Vital Signs:  /50   Pulse 95   Temp 99  F (37.2  C) (Oral)   Resp 16   Wt 44.9 kg (99 lb)   SpO2 94%   BMI 20.00 kg/m      I/O: Even  Weights: Not being tracked  Telemetry: NSR    Less irritable for me, makes her memory loss more apparent,  head is traumatic with scalp hematoma but otherwise within normal limits.  Sclera are clear lungs are diminished in the bases but I do not hear any crackles or wheezing heart is distant but regular without murmurs rubs or gallops belly exam is significantly distended, bowel sounds are very hypoactive she is however nontender.  Skin is warm and dry and there is no cyanosis or clubbing of the extremities.  There is no lower extremity edema  otherwise her affect is appropriate she is alert and oriented but amnestic for the event that led to this hospitalization           Medications:      All current medications were reviewed with changes reflected in problem list.         Data:      All new lab and imaging data was reviewed.   Labs:  Recent Labs    Lab 07/23/19  0641      POTASSIUM 4.8   CHLORIDE 98   CO2 35*   ANIONGAP 2*   GLC 95   BUN 14   CR 0.53   GFRESTIMATED 85   GFRESTBLACK >90   CARLOS 8.8     Recent Labs   Lab 07/23/19  0641   WBC 46.7*   HGB 8.5*   HCT 28.6*   MCV 93        Recent Labs   Lab 07/23/19  0641 07/22/19  0615 07/21/19  0634 07/20/19  0629 07/19/19  0548   GLC 95 108* 106* 124* 122*     Recent Labs   Lab 07/21/19  0634 07/20/19  0629 07/18/19  1401   AST 59* 14  --    ALT 34 14  --    ALKPHOS 87 86  --    BILITOTAL 0.5 0.6 1.3      Imaging:

## 2019-07-23 NOTE — PROGRESS NOTES
Focus: wound care  D: Large skin tears to arms and plan for pt to be discharged tomorrow. RN changed dressings today and reports that things are progressing nicely, wound changes are going easier.     I: review of orders written for wound care and updated to prepare discharge; review of chart and discussion with RN    A/P: improving wounds to arms with current POC. Pt is tolerating dressing changes much better, expecting discharge tomorrow.     Left upper arm/ Elbow; LLE Wounds: Daily and PRN if soiled  1. Cut the outside wrap off and soak the inside dressing with warm water and Baby shampoo- swish to bubble up to soak the old dressing and gently remove. Discard.    2. Spritz the wound with wound cleanser; pat dry  3. Apply small amount of Wound Gel to piece of Adaptic or Vaseline gauze and cover all open  areas   4. Cover with ABD/ Kerlix or Flex Net wrap- Tape

## 2019-07-23 NOTE — PLAN OF CARE
Vital signs:  Temp: 96.4  F (35.8  C) Temp src: Oral BP: 106/50   Heart Rate: 75 Resp: 16 SpO2: 97 %    Neuros: denies N/T  GI: Bowel sounds active x 4.  Had bowel movement yesterday  : Rees in place for urinary retention. Patent  and catheter cares done  SKIN: Multiple injuries and wounds from fall, WOC  following and dressing  CDI. Staples on head will be removed today  Activity: Up with A2  Diet: Regular diet  Pain: C/o generalized pain. Taking oxycodone  2.5 mg and tylenol scheduled.  Plan: Pain control,  placement into TCU. Call made to [facility].  discharge 1-2 days

## 2019-07-23 NOTE — PLAN OF CARE
Discharge Planner PT   Patient plan for discharge: TCU  Current status: mobility with Bere blakely to chair bed mobility with Mod A   Barriers to return to prior living situation:   mobility, pain  Recommendations for discharge: TCU per plan established by the PT.     Rationale for recommendations: if goals remain restorative TCU will be benefical for mobility needs   Entered by: Cherise Soto 07/23/2019 10:02 AM

## 2019-07-23 NOTE — PLAN OF CARE
Alert, orientated. Mild forgetful, anxious at times. Fractures to left side and pelvis. Sling to left arm. Hematoma to left side of head. Dressing changes completed to left shoulder, left elbow, and left leg. Many abrasions and bruising to extremities. Food and fluids well. Maintains tatum for retention. Edema to left arm, silvio area, bilateral hips, left foot. Pain increases with any activity or movement. Frequent repositioning. Oxycodone every 4 hours and tylenol. Up in chair with max assist 2 and abad steady. Sat up in chair for 2 hours. Lungs diminished. Tele remote. IV antibiotic. Plan is TCU tomorrow [facility] 1330. Family at bedside today. Attempted to wean oxygen but desats to 81%. Oxygen on 2L. Infrequent congested cough- thick tan sputum. Bed alarm on for safety.

## 2019-07-23 NOTE — PROGRESS NOTES
Your information has been submitted on July 23rd, 2019 at 12:01:20 PM CDT. The confirmation number is [id number]

## 2019-07-23 NOTE — PROGRESS NOTES
[facility]  Pain Management Progress Note  Text Page     Assessment & Plan   Marie Kline is a 87 year old female who was admitted on 7/15/2019. I was asked by Dr. Vazquez to see the patient for acute on chronic pain management.     1)  Acute on chronic hip and back pain s/p mechanical fall and findings of left arm fracture, pelvic fractures and occipital scalp contusion without intracranial pathology.     2)  Patient with chronic low back and left hip pain, not on chronic opioid medication.  History of tolerating tramadol.  Baseline 0mg Daily Morphine Equivalent.  Patient has no expected opioid tolerance.      Patient's opioid use in past 24 hours: oxycodone 15 mg PO, =  22.5 mg Daily Morphine Equivalent     3)  Risk factors for opioid related harms  - History of Renal insufficiency  - Age > 65 years old  - Anxiety/depression     4)  Opioid induced side-effects:  -Constipation - intermittent in the past when on opiate therapy, currently constipated, enema yesterday with results.  -Nausea/Vomit - resolved.  -Sedation - has experienced sedation with opiates in the past.    -Urinary Retention - none reported.     5)  Other/Related:    - Depression/anxiety - intermittent anxiety, home medication of xanax 0.25 mg bid prn.  - COPD on home oxygen - places patient at high risk for complications secondary to opiates.  - Lethargy, improved.    6) Palliative Consult - patient's chronic medical conditions with new fractures and significant pain/immobility has placed her at high risk for complications.  Patient DNR/DNI with restorative goals including return to her previous living situation.    There is no advanced directive on file, patient's adult children will jointly serve as next of kin for decision making.  There is a POLST on file indicating limited trials of mechanical intubation and artificial nutrition would be acceptable if used as a bridge to recovery to reasonable quality of life.  Patient with  improved mobility and orientation. Plans to discharge to facility for rehab.  Patient hopes to return back to [facility], but her children are wondering if she will need to move to an assisted living room.  Children plan on talking with social work at [facility] for further planning.     PLAN:   1)  Continue IV dilaudid for severe/breakthrough pain.   2)  schedule oxycodone 2.5 mg q 4 hours - hold for significant sedation.  3) advance bowel regimen to include: Senna-docusate 2 tabs twice daily, miralax packet twice daily.   4) Non-opioid multimodal medication therapy  -Topical: Voltaren 1% Topical Gel three times daily to bilateral hips, left arm. Lidocaine Patch 4% apply every evening to left hip and arm.  -N-SAIDS:Avoid due to recent trauma.  -Muscle Relaxants:None indicated  -Adjuvants:Acetaminophen 1000 mg three times daily scheduled  -Antidepresants/anxiolytics: alprazolam 0.25 mg twice daily (home dose)  5)  Non-medication interventions  Positioning, ICE, Relaxation, Distraction with visitors, TV, Essential oils per nursing, Physical therapy as ordered.  6)  Opioids: Pain tolerable when not moving, 8/10 severe pain with movement as minimal as repositioning.  Pain primarily in bilateral hips, groin and left arm.  Encouraged patient to request PRN medications when due so that pain is maintained at tolerable levels.  Patient able to tolerate moving to chair with assist, increasing duration daily.  Patient with minimal pain medications yesterday and continues to endorse significant pain, does not seem to remember to request pain medications.    - oxycodone 2.5 mg PO q 4 hours scheduled.  - oxycodone 2.5 - 5mg PO q 4 hours prn for severe pain.  - dilaudid 0.2 mg IV q 2 hours prn.    Suggested taper off opiates after discharge: (may be subject to change pending patient's progress and pain management requirements to effectively participate in rehabilitation)  Continue scheduled oxycodone as written x 1  week  Week 2, decrease scheduled oxycodone to 2.5 mg every 6 hours.  Week 3, decrease scheduled oxycodone to 2.5 mg every 8 hours.  Week 4, decrease scheduled oxycodone to 2.5 mg every 12 hours,   Week 5, patient to use oxycodone PRN only.    Opioids Treatment Goal:   -Improvement in function  -Participate in PT  -Management of acute pain during hospitalization, expected prolonged need for opioids after DC due to traumatic injury.    7)  Constipation Prophylaxis  - Senna-S 2 tablets twice daily.  - Miralax, 1 packet two times daily.  - encourage fluids.  - encourage activity/OOB three times daily.  8)  Pain Education  -Opioid safe use, storage and disposal information included in DC AVS  - Request pain medications on regular basis to maintain tolerable pain levels and avoid extremes in pain sensation.  8)  DC Planning   Discussed goal of Opioid therapy as above with  patient's family and hospitalist.  Continued outpatient management of pain per rehabilitation facility team.  Disposition: TCU tomorrow. Referrals pending.  Support systems: patient's family involved.  Daughter and Son in town.  Outpatient Referrals: Patient will require outpatient follow up for opiate management following discharge from TCU.  Plan pending as discharge from TCU will depend on patient's progress with therapies.    The following risk factors have been identified for unintentional overdose: patient is > 65 years old, patient has anxiety, depression or PTSD, patient has been switched to a new opioid medication and patient has lung disease. Discharge with intra-nasal naloxone if discharged to home with opioids  >40 mg MME/day.  Plan for education prior to discharge     Na SONG, CNP  Pain Management and Palliative Care  [facility]  Pgr: [phone]    Time Spent on this Encounter   Total unit/floor time 30 minutes (4333 - 5860), time consisted of the following, examination of the patient, reviewing the record  and completing documentation. >50% of time spent in counseling and coordination of care.  Time spend counseling with patient and family consisted of the following topics, goals of care and symptom management.  Time spent in coordination of care with Bedside Nurse , and Hospitalist Dr. Wilkes.     Interval History   Patient with improved pain control.  More alert and interacts with assessment.  Patient defers some information to her son at the bedside.  Ready for discharge to TCU tomorrow.  7/23:  Patient anxious and having moderate pain during assessment, discussed plan of care and patient has some doubt of her abilities to 'get through rehab'.  Son at the bedside and reassured his mother about taking 'one step at a time' and to work to get back to as much independence as possible. Patient states that is her goal.  Deny further questions regarding pain management.    7/22: Discussed plan of care with patient, her daughter and her son.  Daughter states that the patient has said she will work through this and that she feels she is improved since last week.  Daughter acknowledges that patient still looks like she has a lot to recover from and that she and her brother are concerned that the patient will not get back to prior living situation.  Reiterated goals to mobilize and promote therapy, adequate pain management and lung exercises to avoid complications from inactivity such as pneumonia or blood clots. Patient and her children verbalized understanding.    7/19: Discussed plan of care with patient's daughter.  Emphasized that the patient's medical condition does not look favorable and that likelihood of successful recovery and return to previous level of function is low.  Talked about Marie's personality as a 'go getter', strong willed nature and that she had previously wanted to be moderately aggressive with cares when hospitalized for COPD related issues.  Spoke about the compounded impact on Marie at this time of  the significant pain, subsequent immobility and the baseline decreased lung capability due to her COPD.  Margaret, Marie's daughter verbalized understanding of the difficult medical situation.  Educated her about the circumstance that Marie faces, the difficult balance between pain control and mental status along with the detriment to Marie's lung function while she is lying in bed due to pain and the complications that the medical team is concerned with.  States that she would prefer to have her mother awake to talk with her, but in the event that she needs to be more sleepy in order to have her pain needs met she would be ok with that situation.  Margaret expressed understanding of the difficult situation, states she is overwhelmed by this information and her mother's change in status today.  Stated her brother is coming into town to be with her.  Stated her  is out of town this weekend.      Review of Systems   Negative review of systems other than what is mentioned above.    Physical Exam   Temp:  [95.7  F (35.4  C)-96.4  F (35.8  C)] 95.9  F (35.5  C)  Heart Rate:  [74-81] 74  Resp:  [16] 16  BP: (106-130)/(50-65) 129/65  SpO2:  [81 %-97 %] 92 %  99 lbs 0 oz  GEN:  Alert, arouses to voice, oriented to self, location and date, appears comfortable, NAD.  HEENT:  Normocephalic/atraumatic, no scleral icterus, no nasal discharge, mouth moist.  CV:  RRR, S1, S2; no murmurs or other irregularities noted.  +3 DP/PT pulses bilatererally; no edema BLE.  RESP:  Clear to auscultation bilaterally without rales/rhonchi/wheezing/retractions.  Symmetric chest rise on inhalation noted.  Normal respiratory effort.  ABD:  Rounded, soft, non-tender, mild distended.  +BS  EXT:  Edema & pulses as noted above.  CMS intact x 4.     M/S:   Pain limited exam.    SKIN:  Dry to touch, no exanthems noted in the visualized areas.    NEURO: Symmetric strength +5/5.  Sensation to touch intact all extremities.   There is no area of allodynia  or hyperesthesia.  PAIN BEHAVIOR: ZACHARY  Psych:  ZACHARY    Medications     - MEDICATION INSTRUCTIONS -         acetaminophen  1,000 mg Oral Q8H     ALPRAZolam  0.25 mg Oral At Bedtime     atorvastatin  20 mg Oral Daily     diclofenac  2-4 g Transdermal TID     fluticasone-vilanterol  1 puff Inhalation Daily     furosemide  20 mg Oral Daily     gabapentin  300 mg Oral BID     ipratropium - albuterol 0.5 mg/2.5 mg/3 mL  3 mL Nebulization 4x daily     levothyroxine  75 mcg Oral Daily     lidocaine  2 patch Transdermal Q24h    And     lidocaine   Transdermal Q24H    And     lidocaine   Transdermal Q8H     meropenem  500 mg Intravenous Q8H     metoprolol succinate ER  25 mg Oral Daily     oxyCODONE  2.5 mg Oral Q4H     polyethylene glycol  17 g Oral BID     senna-docusate  2 tablet Oral BID     sertraline  50 mg Oral Daily     sodium chloride (PF)  3 mL Intracatheter Q8H     traZODone  100 mg Oral At Bedtime       Data   Recent Labs   Lab 07/23/19  0641 07/22/19  0615 07/21/19  0634  07/20/19  0629   WBC 46.7* 43.4* 40.0*  --  65.2*   HGB 8.5* 8.2* 7.4*   < > 7.1*   MCV 93 93 91  --  90    181 115*  --  102*    136 138   < > 136   POTASSIUM 4.8 4.8 4.7  --  4.2   CHLORIDE 98 102 107  --  104   CO2 35* 32 28  --  29   BUN 14 13 9  --  9   CR 0.53 0.58 0.54  --  0.58   ANIONGAP 2* 2* 3  --  3   CARLOS 8.8 8.9 8.4*  --  8.7   GLC 95 108* 106*  --  124*   ALBUMIN  --   --  1.7*  --  1.9*   PROTTOTAL  --   --  4.7*  --  5.3*   BILITOTAL  --   --  0.5  --  0.6   ALKPHOS  --   --  87  --  86   ALT  --   --  34  --  14   AST  --   --  59*  --  14    < > = values in this interval not displayed.

## 2019-07-23 NOTE — PLAN OF CARE
Pt A&O x4, forgetful at times. VSS. 2L O2 w/ humidification, baseline for patient. Pain managed with scheduled tylenol and oxycodone. CMS intact. Skin tear dressings CDI. Pt did not ambulate during shift, A2 for hygiene cares. Rees catheter in place, cath care competed. Pt incontinent of stool during shift. Tolerating regular diet although minimal appetite. Planning to discharge to a TCU. Will continue to monitor.

## 2019-07-23 NOTE — DISCHARGE INSTRUCTIONS
Left upper arm/ Elbow; LLE Wounds: Daily and PRN if soiled  1. Cut the outside wrap off and soak the inside dressing with warm water and Baby shampoo- swish to bubble up to soak the old dressing and gently remove. Discard.    2. Spritz the wound with wound cleanser; pat dry  3. Apply small amount of Wound Gel to piece of Adaptic or Vaseline gauze and cover all open  areas   4. Cover with ABD/ Kerlix or Flex Net wrap- Tape

## 2019-07-23 NOTE — PROGRESS NOTES
FIDEL     D: Discharge planning continuing... per discussion with MD pt would be ready for transfer tomorrow to TCU. [facility] has been contacted, they will be able to accept pt tomorrow.      I: Met with pt, son and daughter also present in room. Discussed above, pt has asked that planning continue for her transfer to [facility] and has asked that medical transport be arranged. Discussed with pt that the w/c transport would not be covered by her insurance, private pay cost of $75/base and $5/mi. Pt inquired of stretcher transport, discussed with her that this can be arranged with billing to her insurances however SW unable to guarantee coverage, cost of $1040/base and $25/mi. Pt decided on w/c transport.. [facility], 7/24 arranged for @1330.     It is noted by SW that pt is currently on o2, she has managed this at home in the past, will arrange for transport as needed.     Additional questions were addressed.      A/P: Anticipate no problem with arrangements made for transfer tomorrow, SW available until discharge should adjustments be needed in plan as noted.

## 2019-07-24 VITALS
BODY MASS INDEX: 20 KG/M2 | OXYGEN SATURATION: 91 % | HEART RATE: 95 BPM | WEIGHT: 99 LBS | DIASTOLIC BLOOD PRESSURE: 55 MMHG | RESPIRATION RATE: 18 BRPM | SYSTOLIC BLOOD PRESSURE: 114 MMHG | TEMPERATURE: 98.4 F

## 2019-07-24 PROCEDURE — 25000132 ZZH RX MED GY IP 250 OP 250 PS 637: Mod: GY | Performed by: INTERNAL MEDICINE

## 2019-07-24 PROCEDURE — 25000125 ZZHC RX 250: Performed by: INTERNAL MEDICINE

## 2019-07-24 PROCEDURE — 94640 AIRWAY INHALATION TREATMENT: CPT

## 2019-07-24 PROCEDURE — 25000128 H RX IP 250 OP 636: Performed by: INTERNAL MEDICINE

## 2019-07-24 PROCEDURE — 25000132 ZZH RX MED GY IP 250 OP 250 PS 637: Mod: GY | Performed by: NURSE PRACTITIONER

## 2019-07-24 PROCEDURE — 99239 HOSP IP/OBS DSCHRG MGMT >30: CPT | Performed by: INTERNAL MEDICINE

## 2019-07-24 PROCEDURE — 40000275 ZZH STATISTIC RCP TIME EA 10 MIN

## 2019-07-24 PROCEDURE — 99232 SBSQ HOSP IP/OBS MODERATE 35: CPT | Performed by: NURSE PRACTITIONER

## 2019-07-24 RX ORDER — POLYETHYLENE GLYCOL 3350 17 G/17G
17 POWDER, FOR SOLUTION ORAL 2 TIMES DAILY
DISCHARGE
Start: 2019-07-24 | End: 2019-09-01

## 2019-07-24 RX ORDER — OXYCODONE HYDROCHLORIDE 5 MG/1
2.5 TABLET ORAL EVERY 4 HOURS
Qty: 60 TABLET | Refills: 0 | Status: SHIPPED | OUTPATIENT
Start: 2019-07-24 | End: 2019-09-01

## 2019-07-24 RX ORDER — ACETAMINOPHEN 500 MG
1000 TABLET ORAL EVERY 8 HOURS
DISCHARGE
Start: 2019-07-24 | End: 2019-09-01

## 2019-07-24 RX ORDER — AMOXICILLIN 250 MG
2 CAPSULE ORAL 2 TIMES DAILY
DISCHARGE
Start: 2019-07-24 | End: 2019-09-01

## 2019-07-24 RX ORDER — BISACODYL 5 MG
5 TABLET, DELAYED RELEASE (ENTERIC COATED) ORAL DAILY PRN
DISCHARGE
Start: 2019-07-24 | End: 2019-11-13

## 2019-07-24 RX ORDER — OXYCODONE HYDROCHLORIDE 5 MG/1
2.5-5 TABLET ORAL EVERY 4 HOURS PRN
Qty: 15 TABLET | Refills: 0 | Status: SHIPPED | OUTPATIENT
Start: 2019-08-24 | End: 2019-08-18

## 2019-07-24 RX ORDER — ALPRAZOLAM 0.25 MG
0.25 TABLET ORAL 2 TIMES DAILY PRN
Qty: 20 TABLET | Refills: 0 | Status: SHIPPED | OUTPATIENT
Start: 2019-07-24 | End: 2019-07-29

## 2019-07-24 RX ORDER — LIDOCAINE 4 G/G
2 PATCH TOPICAL EVERY 24 HOURS
DISCHARGE
Start: 2019-07-24 | End: 2019-10-15

## 2019-07-24 RX ORDER — IPRATROPIUM BROMIDE AND ALBUTEROL SULFATE 2.5; .5 MG/3ML; MG/3ML
3 SOLUTION RESPIRATORY (INHALATION) 4 TIMES DAILY
DISCHARGE
Start: 2019-07-24 | End: 2019-09-01

## 2019-07-24 RX ADMIN — METOPROLOL SUCCINATE 25 MG: 25 TABLET, EXTENDED RELEASE ORAL at 09:40

## 2019-07-24 RX ADMIN — GABAPENTIN 300 MG: 300 CAPSULE ORAL at 09:40

## 2019-07-24 RX ADMIN — SENNOSIDES AND DOCUSATE SODIUM 2 TABLET: 8.6; 5 TABLET ORAL at 09:40

## 2019-07-24 RX ADMIN — MEROPENEM 500 MG: 500 INJECTION, POWDER, FOR SOLUTION INTRAVENOUS at 00:33

## 2019-07-24 RX ADMIN — IPRATROPIUM BROMIDE AND ALBUTEROL SULFATE 3 ML: .5; 3 SOLUTION RESPIRATORY (INHALATION) at 07:22

## 2019-07-24 RX ADMIN — FLUTICASONE FUROATE AND VILANTEROL TRIFENATATE 1 PUFF: 100; 25 POWDER RESPIRATORY (INHALATION) at 07:22

## 2019-07-24 RX ADMIN — ATORVASTATIN CALCIUM 20 MG: 20 TABLET, FILM COATED ORAL at 09:40

## 2019-07-24 RX ADMIN — FUROSEMIDE 20 MG: 20 TABLET ORAL at 09:41

## 2019-07-24 RX ADMIN — LEVOTHYROXINE SODIUM 75 MCG: 75 TABLET ORAL at 09:40

## 2019-07-24 RX ADMIN — Medication 2.5 MG: at 07:09

## 2019-07-24 RX ADMIN — SERTRALINE HYDROCHLORIDE 50 MG: 50 TABLET ORAL at 09:40

## 2019-07-24 RX ADMIN — ALPRAZOLAM 0.25 MG: 0.25 TABLET ORAL at 12:28

## 2019-07-24 RX ADMIN — ACETAMINOPHEN 1000 MG: 500 TABLET, FILM COATED ORAL at 12:28

## 2019-07-24 RX ADMIN — ACETAMINOPHEN 1000 MG: 500 TABLET, FILM COATED ORAL at 07:09

## 2019-07-24 RX ADMIN — TRAZODONE HYDROCHLORIDE 100 MG: 50 TABLET ORAL at 00:22

## 2019-07-24 RX ADMIN — MEROPENEM 500 MG: 500 INJECTION, POWDER, FOR SOLUTION INTRAVENOUS at 09:37

## 2019-07-24 RX ADMIN — ALPRAZOLAM 0.25 MG: 0.25 TABLET ORAL at 00:21

## 2019-07-24 RX ADMIN — Medication 2.5 MG: at 02:05

## 2019-07-24 RX ADMIN — Medication 2.5 MG: at 12:28

## 2019-07-24 ASSESSMENT — ACTIVITIES OF DAILY LIVING (ADL)
ADLS_ACUITY_SCORE: 22
ADLS_ACUITY_SCORE: 18
ADLS_ACUITY_SCORE: 22
ADLS_ACUITY_SCORE: 18

## 2019-07-24 NOTE — PLAN OF CARE
Pt A&O x4, forgetful and anxious at times. VSS on 2L O2 w/humidification. Pain managed w/ schedule oxycodone. Pt denied lidocaine patch and voltaren gel this shift. CMS intact. Skin tear dressings CDI. Pt did not ambulate during shift, A2 during position changes. Tatum catheter in place, adequate amounts and cath care completed. Tolerating regular diet well. Merem administered per order. Planning discharge to [facility] tomorrow. Will discharge to facility with tatum in place due to retention issues.

## 2019-07-24 NOTE — DISCHARGE SUMMARY
Discharge Summary  Hospitalist Service      Marie Kline MRN# [id number]   YOB: 1932 Age: 87 year old     Date of Admission:  7/15/2019  Date of Discharge:  7/24/2019  Admitting Physician:  Lokesh Slaughter MD  Discharge Physician: Fide Wilkes MD   Discharging Service: Hospitalist Service     Primary Provider: Piper Kiser  Primary Care Physician Phone Number: [phone]         Discharge Diagnoses/Problem Oriented Hospital Course (Providers):    Marie Kline was admitted on 7/15/2019 by Lokesh Slaughter MD and I would refer you to their history and physical.  The following problems were addressed during her hospitalization:      Summary of Stay: Marie Kline is a 87 year old female with a history of CLL WBCs 200-300 range at presentation in 2017, WBC is now are in the ,000 range, history of takosubo cardiomyopathy with mild CAD by cath in 12/2018-echocardiogram at that time estimated ejection fraction at 30 to 35%, hypertension, COPD on chronic oxygen as needed, hypothyroidism admitted on 7/15/2019 after sustaining a mechanical fall with injury to her left shoulder leg and found to have a left shoulder fracture as well as an extensive skin tear.  She also has evidence of a scalp hematoma.     There was consulted and felt that the shoulder fracture is nonoperative and recommended conservative management measures.  During her brief hospitalization she continued to complain of pelvic buttocks pain prompting x-ray which showed evidence of a pelvic fracture.  Further CT imaging revealed bilateral comparison to the iliopubic versus medial superior inferior rami fractures adjacent to hematoma with mass-effect on the urinary bladder.  There was also a sacral alae fracture, left acetabular fracture and avulsion injury of the gluteal tendon attachment.     In the hospital for mobility and placement issues as well as pain control in one with multiple injuries.     Problem List:    1. Fall: unclear mechanism, patient denies any dizziness/syncope but is unable to tell me the sequence of events-just that she landed on the steps.    2. Left shoulder fracture, multiple pelvic fractures. Conservative management per ortho Pain control will need placement.  3. Pain control:  Definitely better than a few days ago per discussion with dtr and pain team.  Currently on ox 2.5 q6 hours but still with some breakthrough pain.  Apparently has had reactions to hydroxyzine in the past.  Has e/o of constipation as well.  Discussed with pain team-plan:  Increase sched oxy to 2.5 mg qid, cont apap 1 gm tid, aggressive bowel regimen-will need placement  4. Hematoma: Stable hemoglobin.  But with mass-effect and urinary bladder.  Has a Rees catheter in place.  5. Closed head injury with scalp hematoma-amnestic for some of the events surrounding the fall.    6. Fever:  Single temp to 103F on  7/19/19-empirically placed on meropenem, UA negative, blood cultures remains NGTD, CXR with patchy RLL pna.  Will complete 7 days of abx, currently on day 5 of meropenem-discharge with 5 more doses of amox-clav  7. Acute on chronic anemia: Hemoglobin typically in the 9- 10 range, did drop to 6.6 but on recheck was in the mid 7 range.  She stable in the mid 7 range and I think this is all acute blood loss in the setting of a couple of hematomas.  There is been no indication for transfusion at this time.  Continue to monitor  8. History of Takotsubo cardiomyopathy: 12/2018, echo at that time showed EF of 30 to 35%, cath did not show significant coronary artery disease. No e/op decompensation. Cont pta regimen of BB/furosemide.  Telemetry shows NSR  9. Hypertension:  Resumed metop xl 25 mg and furosemide 20 mg qd  10. COPD:  On prn O2 at home, rarely uses.  On continuous here-? If due to lack of diaphragmatic distension in the setting of suspected significant constipation.  States she takes her inhalers and nebs only prn at home.   She's getting them scheduled here.  Given the pneumonia I will send her out with scheduled duo nebs for a week as well as her typical inhaled steroid and long-acting beta agonist.  11. Constipation:  Evident on AXR, still protuberant but did have mod bm yesterday.  No abdominal pain and po intake is picking up   12. Hyperglycemia: Mild no indication for further monitoring  13. CLL with a history of progressively elevated white cell counts.  Currently better than baseline at about 40.  Appreciate oncology input.  Also noted to be mildly thrombocytopenic at 115.  She typically gets monthly IVIG which will be held at TCU for cost issues  14. Malnutrition suspected in setting of chronic medical illness now with acute medical process  15. Anxiety: She is not typically on PRN benzodiazepines but she is quite dressed out with all that is been going on so I have agreed to give her some limited alprazolam to have at the TCU.  DVT Prophylaxis: Not on PCD's due to large extremity skin tear, nor she on anticoagulation due to scalp and pelvic hematomas  Code Status: DNR / DNI  Functional Status: Prior to admission was living in independent living at [facility], now obviously in severe pain limited functional status will need AAR U versus TCU  Diet: Regular  Rees: In place due to some retention in the setting of mass-effect from pelvic hematoma            Code Status:      DNR / DNI         Important Results:      As described below          Pending Results:        Unresulted Labs Ordered in the Past 30 Days of this Admission     Date and Time Order Name Status Description    7/19/2019 0627 Blood culture Preliminary     7/19/2019 0627 Blood culture Preliminary                Discharge Instructions and Follow-Up:      Follow-up Appointments     Follow Up and recommended labs and tests      Follow-up with nursing home NP/MD in 3 to 5 days for reassessment  Remove Rees catheter in 2 weeks for voiding trial  PT OT with goal to  return to previous independent living arrangement                  Discharge Disposition:      Discharged to rehabilitation facility          Discharge Medications:        Current Discharge Medication List      START taking these medications    Details   acetaminophen (TYLENOL) 500 MG tablet Take 2 tablets (1,000 mg) by mouth every 8 hours For one week scheduled then change to prn    Associated Diagnoses: Closed fracture of shoulder, unspecified laterality, initial encounter      ALPRAZolam (XANAX) 0.25 MG tablet Take 1 tablet (0.25 mg) by mouth 2 times daily as needed for anxiety  Qty: 20 tablet, Refills: 0    Associated Diagnoses: Anxiety      amoxicillin-clavulanate (AUGMENTIN) 875-125 MG tablet Take 1 tablet by mouth 2 times daily for 5 doses  Qty: 5 tablet, Refills: 0    Associated Diagnoses: Pneumonia of lower lobe due to infectious organism, unspecified laterality (H)      bisacodyl (DULCOLAX) 5 MG EC tablet Take 1 tablet (5 mg) by mouth daily as needed for constipation    Associated Diagnoses: Drug-induced constipation      !! diclofenac (VOLTAREN) 1 % topical gel Apply 2 g topically 4 times daily as needed for moderate pain    Associated Diagnoses: Closed fracture of shoulder, unspecified laterality, initial encounter      !! diclofenac (VOLTAREN) 1 % topical gel Place 2-4 g onto the skin 3 times daily    Associated Diagnoses: Closed fracture of shoulder, unspecified laterality, initial encounter      ipratropium - albuterol 0.5 mg/2.5 mg/3 mL (DUONEB) 0.5-2.5 (3) MG/3ML neb solution Take 1 vial (3 mLs) by nebulization 4 times daily X one week then can change to prn    Associated Diagnoses: COPD exacerbation (H)      Lidocaine (LIDOCARE) 4 % Patch Place 2 patches onto the skin every 24 hours To prevent lidocaine toxicity, patient should be patch free for 12 hrs daily.    Associated Diagnoses: Closed fracture of shoulder, unspecified laterality, initial encounter      !! oxyCODONE (ROXICODONE) 5 MG tablet  Take 0.5 tablets (2.5 mg) by mouth every 4 hours X one week, then decrease to q 6 hours x one week, then to q 8 hours x one week, then to q 12 hours x one week then discontinue this order and continue only with prn oxycodone and have TCU MD/NH wean the prn oxy  Qty: 60 tablet, Refills: 0    Associated Diagnoses: Closed fracture of shoulder, unspecified laterality, initial encounter      !! oxyCODONE (ROXICODONE) 5 MG tablet Take 0.5-1 tablets (2.5-5 mg) by mouth every 4 hours as needed for moderate to severe pain  Qty: 15 tablet, Refills: 0    Associated Diagnoses: Closed fracture of shoulder, unspecified laterality, initial encounter      polyethylene glycol (MIRALAX/GLYCOLAX) packet Take 17 g by mouth 2 times daily    Associated Diagnoses: Drug-induced constipation      senna-docusate (SENOKOT-S/PERICOLACE) 8.6-50 MG tablet Take 2 tablets by mouth 2 times daily    Associated Diagnoses: Drug-induced constipation       !! - Potential duplicate medications found. Please discuss with provider.      CONTINUE these medications which have CHANGED    Details   Immune Globulin, Human, (OCTAGAM) 5 GM/100ML SOLN 300 mg/kg into the vein every 30 days    Hold this medication while in TCU-resume at discharge from TCU    Associated Diagnoses: Acute and chronic respiratory failure with hypercapnia (H)         CONTINUE these medications which have NOT CHANGED    Details   albuterol (PROAIR HFA/PROVENTIL HFA/VENTOLIN HFA) 108 (90 BASE) MCG/ACT Inhaler Inhale 2 puffs into the lungs every 6 hours as needed for wheezing  Qty: 1 Inhaler, Refills: 8    Associated Diagnoses: Intermittent asthma without complication      albuterol (PROVENTIL) (2.5 MG/3ML) 0.083% neb solution Take 2.5 mg by nebulization every 6 hours as needed for shortness of breath / dyspnea or wheezing      aspirin 81 MG EC tablet Take 81 mg by mouth daily      atorvastatin (LIPITOR) 20 MG tablet Take 1 tablet (20 mg) by mouth daily  Qty: 90 tablet, Refills: 4     Comments: Profile Rx: patient will contact pharmacy when needed  Associated Diagnoses: ACS (acute coronary syndrome) (H); Hyperlipidemia with target LDL less than 130      budesonide-formoterol (SYMBICORT) 80-4.5 MCG/ACT Inhaler Inhale 2 puffs into the lungs 2 times daily      calcium carbonate-vitamin D (OS-CARLOS) 500-400 MG-UNIT tablet Take 1 tablet by mouth daily      ferrous sulfate (IRON) 325 (65 FE) MG tablet Take 325 mg by mouth daily (with breakfast)       furosemide (LASIX) 20 MG tablet Take 1 tablet (20 mg) by mouth daily  Qty: 30 tablet, Refills: 0    Associated Diagnoses: Chronic congestive heart failure, unspecified heart failure type (H)      gabapentin (NEURONTIN) 300 MG capsule Take 300 mg by mouth 2 times daily      levothyroxine (SYNTHROID/LEVOTHROID) 75 MCG tablet Take 1 tablet (75 mcg) by mouth daily  Qty: 90 tablet, Refills: 4    Comments: Profile Rx: patient will contact pharmacy when needed  Associated Diagnoses: Other specified hypothyroidism      metoprolol succinate ER (TOPROL-XL) 25 MG 24 hr tablet Take 1 tablet (25 mg) by mouth daily  Qty: 90 tablet, Refills: 4    Associated Diagnoses: ACS (acute coronary syndrome) (H)      Multiple Vitamins-Minerals (MULTIVITAMIN GUMMIES ADULT PO) Take 2 tablets by mouth daily       sertraline (ZOLOFT) 50 MG tablet Take 1 tablet (50 mg) by mouth daily  Qty: 90 tablet, Refills: 1    Comments: Profile Rx: patient will contact pharmacy when needed  Associated Diagnoses: Anxiety      traZODone (DESYREL) 50 MG tablet Take 2 tablets (100 mg) by mouth At Bedtime  Qty: 90 tablet, Refills: 0    Associated Diagnoses: Primary insomnia         STOP taking these medications       traMADol (ULTRAM) 50 MG tablet Comments:   Reason for Stopping:                    Allergies:         Allergies   Allergen Reactions     Diagnostic X-Ray Materials Hives     CT DYE     Contrast Dye Hives     CT DYE     Prolia [Denosumab]      Osteonecrosis jaw       Wound Dressing Adhesive              Consultations This Hospital Stay:      Consultation during this admission received from oncology, pain service and orthopedics          Condition and Physical Exam on Discharge:      Discharge condition: Stable   Discharge vitals: Blood pressure 114/55, pulse 95, temperature 98.4  F (36.9  C), temperature source Oral, resp. rate 18, weight 44.9 kg (99 lb), SpO2 91 %, not currently breastfeeding.     Constitutional:  Frail diminutive female in no acute distress head is normocephalic atraumatic and sclera clear    Lungs:  Diminished in bases but otherwise clear   Cardiovascular:  Regular rate and rhythm without murmurs rubs or gallops   Abdomen:  Still moderately distended but less so today.  Soft and nontender   Skin:  Warm and dry, multiple ecchymoses   Other:  Affect is anxious but she is cooperative and pleasant and interactive she is alert and oriented           Discharge Orders for Skilled Facility (from Discharge Orders):        After Care Instructions     Activity - Up with nursing assistance      Additional Discharge Instructions      Continue scheduled oxycodone as written x 1 week  Week 2, decrease scheduled oxycodone to 2.5 mg every 6 hours.  Week 3, decrease scheduled oxycodone to 2.5 mg every 8 hours.  Week 4, decrease scheduled oxycodone to 2.5 mg every 12 hours,   Week 5, patient to use oxycodone PRN only.         Advance Diet as Tolerated      Follow this diet upon discharge: Orders Placed This Encounter      Snacks/Supplements Adult: Boost Shake; Between Meals      Combination Diet Regular Diet Adult         Discharge Instructions      Mobilize with PT/OT if tolerated              Non-WB LUE.              WBAT BLE's               Continue current pain regiment              Dressings: Keep sling in place for comfort.  Able to remove for hygiene/elbow ROM as tolerated              Follow-up: 2 weeks Dr Ramey.  Call [phone]         Rees catheter      To straight gravity drainage.  Change catheter every 2 weeks and PRN for leaking or decreased uring output with signs of bladder distention. DO NOT change catheter without a specific MD order IF diagnosis of benign prostatic hypertrophy (BPH), neurogenic bladder, or other urological conditions         General info for SNF      Length of Stay Estimate: Short Term Care: Estimated # of Days 30-60  Condition at Discharge: Improving  Level of care:skilled   Rehabilitation Potential: Fair  Admission H&P remains valid and up-to-date: Yes  Recent Chemotherapy: N/A  Use Nursing Home Standing Orders: Yes         Mantoux instructions      Give two-step Mantoux (PPD) Per Facility Policy Yes         Oxygen - Nasal cannula      1-2 Lpm by nasal cannula to keep O2 sats 92% or greater.         Wound care      Left upper arm/ Elbow; LLE Wounds: Daily and PRN if soiled  1. Cut the outside wrap off and soak the inside dressing with warm water and Baby shampoo- swish to bubble up to soak the old dressing and gently remove. Discard.    2. Spritz the wound with wound cleanser; pat dry  3. Apply small amount of Wound Gel to piece of Adaptic or Vaseline gauze and cover all open  areas   4. Cover with ABD/ Kerlix or Flex Net wrap- Tape                    Rehab orders for Skilled Facility (from Discharge Orders):      Referrals     Future Labs/Procedures    Occupational Therapy Adult Consult     Comments:    Evaluate and treat as clinically indicated.    Reason: Multiple fractures after a fall    Physical Therapy Adult Consult     Comments:    Evaluate and treat as clinically indicated.    Reason: Multiple fractures after a fall             Discharge Time:      Greater than 30 minutes.        Image Results From This Hospital Stay (For Non-EPIC Providers):        Results for orders placed or performed during the hospital encounter of 07/15/19   Head CT w/o contrast    Narrative    CT SCAN OF THE HEAD WITHOUT CONTRAST   7/15/2019 8:23 PM     HISTORY: fall, L  parietal/occiptal hematoma    TECHNIQUE:  Axial images of the head and coronal reformations without  IV contrast material. Radiation dose for this scan was reduced using  automated exposure control, adjustment of the mA and/or kV according  to patient size, or iterative reconstruction technique.    COMPARISON: None.    FINDINGS:     Intracranial contents: There is diffuse parenchymal volume loss.   White matter changes are present in the cerebral hemispheres that are  consistent with small vessel ischemic disease in this age patient.  There is no evidence of intracranial hemorrhage, mass, acute infarct  or anomaly.    Visualized orbits/sinuses/mastoids:  The visualized portions of the  sinuses and mastoids appear normal.    Osseous structures/soft tissues:  There is soft tissue swelling over  the left parietal region. No intracranial hemorrhage or skull  fractures.      Impression    IMPRESSION: No intracranial hemorrhage or skull fractures are  identified.      PATRIZIA DORSEY MD   XR Shoulder Left G/E 3 Views    Narrative    XR SHOULDER LT G/E 3 VW   7/15/2019 8:59 PM     HISTORY:  fall, pain    COMPARISON:  None      Impression    IMPRESSION: Acute comminuted fracture of the proximal left humerus.  There is a fracture line across the surgical neck with lateral  displacement of the distal fracture fragment. A nondisplaced fracture  line extends into the greater tuberosity. Normal joint alignment  maintained. Surrounding soft tissue swelling. Aortic atherosclerosis.    ALEA BEYER MD   XR Hand Left G/E 3 Views    Narrative    HAND THREE VIEWS LEFT  7/15/2019 8:57 PM     HISTORY: fall, pain    COMPARISON: None.      Impression    IMPRESSION: Age-indeterminate minimally displaced fractures of the  scaphoid waist and dorsal triquetrum. Normal joint alignment  maintained. Degenerative changes throughout the hand and wrist.  Chondrocalcinosis of the triangle fibrocartilage. Osteopenia.    ALEA BEYER MD   XR Tibia  & Fibula Left 2 Views    Narrative    XR TIBIA & FIBULA LT 2 VW   7/15/2019 8:59 PM     HISTORY:  fall,pain    COMPARISON:  None      Impression    IMPRESSION: No acute fracture identified. Mild degenerative changes of  the knee and ankle joints. Osteopenia.    ALEA BEYER MD   XR Pelvis and Hip Bilateral 2 Views    Narrative    PELVIS AND HIP BILATERAL TWO VIEWS   7/16/2019 7:19 PM     HISTORY:  Fall.    COMPARISON: 11/19/2018    FINDINGS: Degenerative change of the lower lumbar spine. No  significant change with respect to the left hip arthroplasty.  Chondrocalcinosis of the symphysis pubis      Impression    IMPRESSION: Mildly comminuted fracture of the left pubic symphysis  with mild displacement and distraction. There is also a fracture at  the lateral aspect of the left superior pubic ramus. There is a  slightly displaced fracture of the right pubic symphysis. There is a  fracture of the adjacent right inferior pubic ramus.    SHREE JOEL MD   CT Pelvis Bone wo Contrast    Narrative    CT PELVIS BONE WITHOUT CONTRAST July 17, 2019 1:19 PM     HISTORY: Pelvic fracture, known or suspected, evaluate left acetabular  involvement.    TECHNIQUE: Radiation dose for this scan was reduced using automated  exposure control, adjustment of the mA and/or kV according to patient  size, or iterative reconstruction technique.    FINDINGS: Right sacral alar fracture is noted. There is a medial  fracture in the right inferior pubic ramus and probably the medial  aspect of the right superior pubic ramus as well. Additionally, there  is fracture involving the left pubis versus medial aspects of the left  superior/inferior pubic rami. Left hip arthroplasty is noted with  associated metallic artifact. There may be a hairline fracture  involving the medial wall of the left acetabulum, not well seen.  Linear calcification lateral to the left greater trochanter could  represent an avulsion injury at the gluteal attachments but is  of  indeterminate age. Rees catheter is in place. Urinary bladder is  collapsed but likely posteriorly displaced due to hematoma associated  with the pubic fractures. The tip of the spleen extends below the left  iliac crest suggesting possible splenomegaly. Advanced degenerative  changes are noted in the lower lumbar spine. Chondrocalcinosis is  demonstrated in the pubis symphysis. Atherosclerotic calcifications  are noted.      Impression    IMPRESSION:  1. Bilateral parasymphyseal pubic versus medial superior/inferior rami  fractures. Adjacent hematoma with mass effect on the urinary bladder.  2. Right sacral alar fracture.  3. Suspected nondisplaced hairline fracture in the medial wall of the  left acetabulum. There is metal artifact related to left hip  hemiarthroplasty.  4. Left greater trochanter lateral linear calcification which could  represent an avulsion injury at the gluteal tendon attachments but is  age indeterminate.  5. Possible splenomegaly with the inferior tip of the spleen extending  caudal to the iliac crest.    AAYUSH KING MD   XR Chest Port 1 View    Narrative    CHEST ONE VIEW PORTABLE   7/19/2019 11:16 AM     HISTORY:  Fever.    COMPARISON: 6/18/2019.      Impression    IMPRESSION: Patchy abnormal airspace opacity in the right lung  concerning for developing pneumonia. The left lung is clear. No  pneumothorax. No significant pleural effusion. Heart size normal.    PADMINI MOULTON MD   XR Abdomen Port 2 Views    Narrative    ABDOMEN PORTABLE TWO VIEWS    7/21/2019 2:42 PM     HISTORY: Abdominal distension.       Impression    IMPRESSION: Portable supine and upright views of the abdomen are  performed. Scattered bowel gas is noted within the small bowel and  colon. Fecal debris is noted in the sigmoid colon, rectum and right  colon. No abnormal air-fluid levels are present to suggest  obstruction. Degenerative lumbar spine changes are noted. No free air  is noted under the  hemidiaphragms. Lung bases demonstrate mild  interstitial fibrotic change but no focal infiltrates where imaged.  Parasymphyseal and possible left acetabular fracture as described on  prior CT 7/17/2019. Patient is status post left hip replacement. Aorta  and branch vessels demonstrates calcified plaque.         AD CONDE MD           Most Recent Lab Results In EPIC (For Non-EPIC Providers):    Most Recent 3 CBC's:  Recent Labs   Lab Test 07/23/19  0641 07/22/19  0615 07/21/19  0634   WBC 46.7* 43.4* 40.0*   HGB 8.5* 8.2* 7.4*   MCV 93 93 91    181 115*      Most Recent 3 BMP's:  Recent Labs   Lab Test 07/23/19  0641 07/22/19  0615 07/21/19  0634    136 138   POTASSIUM 4.8 4.8 4.7   CHLORIDE 98 102 107   CO2 35* 32 28   BUN 14 13 9   CR 0.53 0.58 0.54   ANIONGAP 2* 2* 3   CARLOS 8.8 8.9 8.4*   GLC 95 108* 106*     Most Recent 3 INR's:  Recent Labs   Lab Test 08/24/18  1306 10/09/14  0350   INR 1.00 1.21*     Most Recent 2 LFT's:  Recent Labs   Lab Test 07/21/19  0634 07/20/19  0629   AST 59* 14   ALT 34 14   ALKPHOS 87 86   BILITOTAL 0.5 0.6     Most Recent Cholesterol Panel:  Recent Labs   Lab Test 05/01/19  1144   CHOL 161   LDL 84   HDL 45*   TRIG 160*     Most Recent 6 Bacteria Isolates From Any Culture (See EPIC Reports for Culture Details):  Recent Labs   Lab Test 07/19/19  0645 07/19/19  0635 06/18/19  0313 06/18/19  0312 05/07/19  1329 05/07/19  1324   CULT No growth after 5 days No growth after 5 days No growth No growth No growth No growth     Most Recent TSH, T4 and HgbA1c:  Recent Labs   Lab Test 05/01/19  1144 12/09/18  0652  06/17/16  0400   TSH 0.46 7.36*   < >  --    T4  --  1.15   < >  --    A1C  --   --   --  5.6    < > = values in this interval not displayed.

## 2019-07-24 NOTE — PROGRESS NOTES
Oncology/Hematology Follow Up Note:    Assessment and Plan:  Marie Kline is a 87 year old female who was admitted on 7/15/2019.         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     Code Status: DNR/DNI    Subjective:    Doing well, still weak and tired happy to be discharged soon.  She is hoping that she can get stronger.      Labs:  All labs reviewed    CBC  Recent Labs   Lab 07/23/19  0641 07/22/19  0615 07/21/19  0634   WBC 46.7* 43.4* 40.0*   HGB 8.5* 8.2* 7.4*   MCV 93 93 91    181 115*       CMP  Recent Labs   Lab 07/23/19  0641 07/22/19  0615 07/21/19  0634 07/20/19  1205 07/20/19  0629  07/18/19  1401    136 138 137 136   < >  --    POTASSIUM 4.8 4.8 4.7  --  4.2   < >  --    CHLORIDE 98 102 107  --  104   < >  --    CO2 35* 32 28  --  29   < >  --    ANIONGAP 2* 2* 3  --  3   < >  --    GLC 95 108* 106*  --  124*   < >  --    BUN 14 13 9  --  9   < >  --    CR 0.53 0.58 0.54  --  0.58   < >  --    GFRESTIMATED 85 83 85  --  83   < >  --    GFRESTBLACK >90 >90 >90  --  >90   < >  --    CARLOS 8.8 8.9 8.4*  --  8.7   < >  --    PROTTOTAL  --   --  4.7*  --  5.3*  --   --    ALBUMIN  --   --  1.7*  --  1.9*  --   --    BILITOTAL  --   --  0.5   --  0.6  --  1.3   ALKPHOS  --   --  87  --  86  --   --    AST  --   --  59*  --  14  --   --    ALT  --   --  34  --  14  --   --     < > = values in this interval not displayed.       INRNo lab results found in last 7 days.    Blood Culture  Recent Labs   Lab 07/19/19  0645 07/19/19  0635   CULT No growth after 5 days No growth after 5 days         Isabel Ivory MD  [facility]  7/24/2019 11:04 AM

## 2019-07-24 NOTE — PROGRESS NOTES
[facility]  Pain Management Progress Note  Text Page     Assessment & Plan   Marie Kline is a 87 year old female who was admitted on 7/15/2019. I was asked by Dr. Vazquez to see the patient for acute on chronic pain management.     1)  Acute on chronic hip and back pain s/p mechanical fall and findings of left arm fracture, pelvic fractures and occipital scalp contusion without intracranial pathology.     2)  Patient with chronic low back and left hip pain, not on chronic opioid medication.  History of tolerating tramadol.  Baseline 0mg Daily Morphine Equivalent.  Patient has no expected opioid tolerance.      Patient's opioid use in past 24 hours: oxycodone 10 mg PO, =  15 mg Daily Morphine Equivalent     3)  Risk factors for opioid related harms  - History of Renal insufficiency  - Age > 65 years old  - Anxiety/depression     4)  Opioid induced side-effects:  -Constipation - intermittent in the past when on opiate therapy, currently constipated, enema yesterday with results.  -Nausea/Vomit - resolved.  -Sedation - has experienced sedation with opiates in the past.    -Urinary Retention - none reported.     5)  Other/Related:    - Depression/anxiety - intermittent anxiety, home medication of xanax 0.25 mg bid prn.  - COPD on home oxygen - places patient at high risk for complications secondary to opiates.  - Lethargy, improved.    6) Palliative Consult - patient's chronic medical conditions with new fractures and significant pain/immobility has placed her at high risk for complications.  Patient DNR/DNI with restorative goals including return to her previous living situation.    There is no advanced directive on file, patient's adult children will jointly serve as next of kin for decision making.  There is a POLST on file indicating limited trials of mechanical intubation and artificial nutrition would be acceptable if used as a bridge to recovery to reasonable quality of life.  Patient with improved  mobility and orientation. Plans to discharge to facility for rehab.  Patient hopes to return back to [facility], but her children are wondering if she will need to move to an assisted living room.  Children plan on talking with [facility] work at [facility] for further planning.     PLAN:   1)  Continue IV dilaudid for severe/breakthrough pain.   2)  schedule oxycodone 2.5 mg q 4 hours - hold for significant sedation.  3) advance bowel regimen to include: Senna-docusate 2 tabs twice daily, miralax packet twice daily.   4) Non-opioid multimodal medication therapy  -Topical: Voltaren 1% Topical Gel three times daily to bilateral hips, left arm. Lidocaine Patch 4% apply every evening to left hip and arm.  -N-SAIDS:Avoid due to recent trauma.  -Muscle Relaxants:None indicated  -Adjuvants:Acetaminophen 1000 mg three times daily scheduled  -Antidepresants/anxiolytics: alprazolam 0.25 mg twice daily (home dose)  5)  Non-medication interventions  Positioning, ICE, Relaxation, Distraction with visitors, TV, Essential oils per nursing, Physical therapy as ordered.  6)  Opioids: Pain tolerable when not moving, 8/10 severe pain with movement as minimal as repositioning.  Pain primarily in bilateral hips, groin and left arm.  Encouraged patient to request PRN medications when due so that pain is maintained at tolerable levels.  Patient able to tolerate moving to chair with assist, increasing duration daily.  Patient with minimal pain medications yesterday and continues to endorse significant pain, does not seem to remember to request pain medications.    - oxycodone 2.5 mg PO q 4 hours scheduled.  - oxycodone 2.5 - 5mg PO q 4 hours prn for severe pain.  - dilaudid 0.2 mg IV q 2 hours prn.    Suggested taper off opiates after discharge: (may be subject to change pending patient's progress and pain management requirements to effectively participate in rehabilitation)  Continue PRN oxycodone as written.  Continue scheduled  oxycodone as written x 1 week  Week 2, decrease scheduled oxycodone to 2.5 mg every 6 hours.  Week 3, decrease scheduled oxycodone to 2.5 mg every 8 hours.  Week 4, decrease scheduled oxycodone to 2.5 mg every 12 hours,   Week 5, patient to use oxycodone PRN only.    Opioids Treatment Goal:   -Improvement in function  -Participate in PT  -Management of acute pain during hospitalization, expected prolonged need for opioids after DC due to traumatic injury.    7)  Constipation Prophylaxis  - Senna-S 2 tablets twice daily.  - Miralax, 1 packet two times daily.  - encourage fluids.  - encourage activity/OOB three times daily.  8)  Pain Education  -Opioid safe use, storage and disposal information included in DC AVS  - Request pain medications on regular basis to maintain tolerable pain levels and avoid extremes in pain sensation.  8)  DC Planning   Discussed goal of Opioid therapy as above with  patient's family and hospitalist.  Continued outpatient management of pain per rehabilitation facility team.  Disposition: TCU tomorrow. Referrals pending.  Support systems: patient's family involved.  Daughter and Son in town.  Outpatient Referrals: Patient will require outpatient follow up for opiate management following discharge from TCU.  Plan pending as discharge from TCU will depend on patient's progress with therapies.    The following risk factors have been identified for unintentional overdose: patient is > 65 years old, patient has anxiety, depression or PTSD, patient has been switched to a new opioid medication and patient has lung disease. Discharge with intra-nasal naloxone if discharged to home with opioids  >40 mg MME/day.  Plan for education prior to discharge     Na SONG, CNP  Pain Management and Palliative Care  [facility]  Pgr: [phone]    Time Spent on this Encounter   Total unit/floor time 30 minutes (3823 - 1654), time consisted of the following, examination of the  patient, reviewing the record and completing documentation. >50% of time spent in counseling and coordination of care.  Time spend counseling with patient and family consisted of the following topics, goals of care, symptom management and communication goals with therapy team at TCU..  Time spent in coordination of care with Bedside Nurse , and Hospitalist Dr. Wilkes.     Interval History   Patient with improved pain control.  More alert and interacts with assessment.  Patient defers some information to her son at the bedside.  Ready for discharge to TCU tomorrow.  7/23:  Patient anxious and having moderate pain during assessment, discussed plan of care and patient has some doubt of her abilities to 'get through rehab'.  Son at the bedside and reassured his mother about taking 'one step at a time' and to work to get back to as much independence as possible. Patient states that is her goal.  Deny further questions regarding pain management.    7/22: Discussed plan of care with patient, her daughter and her son.  Daughter states that the patient has said she will work through this and that she feels she is improved since last week.  Daughter acknowledges that patient still looks like she has a lot to recover from and that she and her brother are concerned that the patient will not get back to prior living situation.  Reiterated goals to mobilize and promote therapy, adequate pain management and lung exercises to avoid complications from inactivity such as pneumonia or blood clots. Patient and her children verbalized understanding.    7/19: Discussed plan of care with patient's daughter.  Emphasized that the patient's medical condition does not look favorable and that likelihood of successful recovery and return to previous level of function is low.  Talked about Marie's personality as a 'go getter', strong willed nature and that she had previously wanted to be moderately aggressive with cares when hospitalized for COPD  related issues.  Spoke about the compounded impact on Marie at this time of the significant pain, subsequent immobility and the baseline decreased lung capability due to her COPD.  Margaret, Marie's daughter verbalized understanding of the difficult medical situation.  Educated her about the circumstance that Marie faces, the difficult balance between pain control and mental status along with the detriment to Marie's lung function while she is lying in bed due to pain and the complications that the medical team is concerned with.  States that she would prefer to have her mother awake to talk with her, but in the event that she needs to be more sleepy in order to have her pain needs met she would be ok with that situation.  Margaret expressed understanding of the difficult situation, states she is overwhelmed by this information and her mother's change in status today.  Stated her brother is coming into town to be with her.  Stated her  is out of town this weekend.      Review of Systems   Negative review of systems other than what is mentioned above.    Physical Exam   Temp:  [98.4  F (36.9  C)-99  F (37.2  C)] 98.4  F (36.9  C)  Heart Rate:  [83-85] 83  Resp:  [16-18] 18  BP: (114-141)/(50-66) 114/55  SpO2:  [81 %-94 %] 91 %  99 lbs 0 oz  GEN:  Alert, arouses to voice, oriented to self, location and date, appears comfortable, NAD.  HEENT:  Normocephalic/atraumatic, no scleral icterus, no nasal discharge, mouth moist.  CV:  RRR, S1, S2; no murmurs or other irregularities noted.  +3 DP/PT pulses bilatererally; no edema BLE.  RESP:  Clear to auscultation bilaterally without rales/rhonchi/wheezing/retractions.  Symmetric chest rise on inhalation noted.  Normal respiratory effort.  ABD:  Rounded, soft, non-tender, mild distended.  +BS  EXT:  Edema & pulses as noted above.  CMS intact x 4.     M/S:   Pain limited exam.    SKIN:  Dry to touch, no exanthems noted in the visualized areas.    NEURO: Symmetric strength +5/5.   Sensation to touch intact all extremities.   There is no area of allodynia or hyperesthesia.  PAIN BEHAVIOR: ZACHARY  Psych:  ZACHARY    Medications     - MEDICATION INSTRUCTIONS -         acetaminophen  1,000 mg Oral Q8H     ALPRAZolam  0.25 mg Oral At Bedtime     atorvastatin  20 mg Oral Daily     diclofenac  2-4 g Transdermal TID     fluticasone-vilanterol  1 puff Inhalation Daily     furosemide  20 mg Oral Daily     gabapentin  300 mg Oral BID     ipratropium - albuterol 0.5 mg/2.5 mg/3 mL  3 mL Nebulization 4x daily     levothyroxine  75 mcg Oral Daily     lidocaine  2 patch Transdermal Q24h    And     lidocaine   Transdermal Q24H    And     lidocaine   Transdermal Q8H     meropenem  500 mg Intravenous Q8H     metoprolol succinate ER  25 mg Oral Daily     oxyCODONE  2.5 mg Oral Q4H     polyethylene glycol  17 g Oral BID     senna-docusate  2 tablet Oral BID     sertraline  50 mg Oral Daily     sodium chloride (PF)  3 mL Intracatheter Q8H     traZODone  100 mg Oral At Bedtime       Data   Recent Labs   Lab 07/23/19  0641 07/22/19  0615 07/21/19  0634  07/20/19  0629   WBC 46.7* 43.4* 40.0*  --  65.2*   HGB 8.5* 8.2* 7.4*   < > 7.1*   MCV 93 93 91  --  90    181 115*  --  102*    136 138   < > 136   POTASSIUM 4.8 4.8 4.7  --  4.2   CHLORIDE 98 102 107  --  104   CO2 35* 32 28  --  29   BUN 14 13 9  --  9   CR 0.53 0.58 0.54  --  0.58   ANIONGAP 2* 2* 3  --  3   CARLOS 8.8 8.9 8.4*  --  8.7   GLC 95 108* 106*  --  124*   ALBUMIN  --   --  1.7*  --  1.9*   PROTTOTAL  --   --  4.7*  --  5.3*   BILITOTAL  --   --  0.5  --  0.6   ALKPHOS  --   --  87  --  86   ALT  --   --  34  --  14   AST  --   --  59*  --  14    < > = values in this interval not displayed.

## 2019-07-24 NOTE — PROGRESS NOTES
Pt assisted into w/c with [facility].  Personal belongings gathered and sent with family. Pt leaves via [facility] for [facility]. O2 tank with pt-on 2L via [facility].

## 2019-07-24 NOTE — PLAN OF CARE
Pt is up with an assist of 2 with abad steady. LS clear but diminished in bases. BS active, passing flatus. LBM 7/22/19. CMS intact with no numbness/tingling. Moderate  strength in both hands. Ecchymosis throughout L shoulder and L lateral side. Lidoderm patches on. Rees in place d/t pelvic hematoma. Pt to discharge to [facility] via [facility] at 1330.

## 2019-07-24 NOTE — PLAN OF CARE
Physical Therapy Discharge Summary    Reason for therapy discharge:    Discharged to transitional care facility.    Progress towards therapy goal(s). See goals on Care Plan in [facility] electronic health record for goal details.  Goals not met.  Barriers to achieving goals:   discharge from facility.    Therapy recommendation(s):    Continued therapy is recommended.  Rationale/Recommendations:  PT as indicated at TCU.     Note: Pt not seen by documenting PT on this date. Information obtained from chart review.

## 2019-07-25 ENCOUNTER — NURSING HOME VISIT (OUTPATIENT)
Dept: GERIATRICS | Facility: CLINIC | Age: 84
End: 2019-07-25
Payer: MEDICARE

## 2019-07-25 ENCOUNTER — PATIENT OUTREACH (OUTPATIENT)
Dept: CARE COORDINATION | Facility: CLINIC | Age: 84
End: 2019-07-25

## 2019-07-25 VITALS
HEIGHT: 59 IN | HEART RATE: 87 BPM | OXYGEN SATURATION: 91 % | SYSTOLIC BLOOD PRESSURE: 132 MMHG | TEMPERATURE: 97.9 F | RESPIRATION RATE: 18 BRPM | BODY MASS INDEX: 24.07 KG/M2 | WEIGHT: 119.4 LBS | DIASTOLIC BLOOD PRESSURE: 73 MMHG

## 2019-07-25 DIAGNOSIS — I50.9 CHRONIC CONGESTIVE HEART FAILURE, UNSPECIFIED HEART FAILURE TYPE (H): ICD-10-CM

## 2019-07-25 DIAGNOSIS — J44.1 COPD EXACERBATION (H): ICD-10-CM

## 2019-07-25 DIAGNOSIS — T14.8XXA MULTIPLE SKIN TEARS: ICD-10-CM

## 2019-07-25 DIAGNOSIS — S42.92XD CLOSED FRACTURE OF LEFT SHOULDER WITH ROUTINE HEALING, SUBSEQUENT ENCOUNTER: Primary | ICD-10-CM

## 2019-07-25 DIAGNOSIS — N94.89 PELVIC HEMATOMA, FEMALE: ICD-10-CM

## 2019-07-25 DIAGNOSIS — S09.90XD CLOSED HEAD INJURY, SUBSEQUENT ENCOUNTER: ICD-10-CM

## 2019-07-25 DIAGNOSIS — F41.9 ANXIETY: ICD-10-CM

## 2019-07-25 DIAGNOSIS — I42.8 NONISCHEMIC CARDIOMYOPATHY (H): ICD-10-CM

## 2019-07-25 DIAGNOSIS — R53.81 PHYSICAL DECONDITIONING: ICD-10-CM

## 2019-07-25 DIAGNOSIS — S32.9XXD CLOSED NONDISPLACED FRACTURE OF PELVIS WITH ROUTINE HEALING, UNSPECIFIED PART OF PELVIS, SUBSEQUENT ENCOUNTER: ICD-10-CM

## 2019-07-25 DIAGNOSIS — C91.10 CLL (CHRONIC LYMPHOCYTIC LEUKEMIA) (H): ICD-10-CM

## 2019-07-25 DIAGNOSIS — J18.9 PNEUMONIA OF LEFT LUNG DUE TO INFECTIOUS ORGANISM, UNSPECIFIED PART OF LUNG: ICD-10-CM
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
## 2019-07-25 DIAGNOSIS — I25.10 CORONARY ARTERY DISEASE INVOLVING NATIVE CORONARY ARTERY OF NATIVE HEART WITHOUT ANGINA PECTORIS: ICD-10-CM

## 2019-07-25 PROCEDURE — 99310 SBSQ NF CARE HIGH MDM 45: CPT | Performed by: NURSE PRACTITIONER

## 2019-07-25 RX ORDER — OXYCODONE HYDROCHLORIDE 5 MG/1
2.5 TABLET ORAL EVERY 4 HOURS PRN
COMMUNITY
End: 2019-08-03

## 2019-07-25 ASSESSMENT — MIFFLIN-ST. JEOR: SCORE: 882.22

## 2019-07-25 NOTE — PROGRESS NOTES
Clinic Care Coordination Contact    Care Coordination Transition Communication    Clinical Data:   Patient was hospitalized at [facility] from 7/15/19 to 7/24/19 for evaluation of a mechanical fall.  She was found to have a left scalp contusion/hematoma; left shoulder fracture with extensive skin tear.    Problem list at discharge:   1. Fall: unclear mechanism.   2. Left shoulder fracture, multiple pelvic fractures.   3. Pain control  4. Closed head injury with scalp hematoma.  5. Fever: discharged with 5 more doses of Augmentin.  6. Acute on chronic anemia  7. History of Takotsubo cardiomyopathy: 12/2018.  8. Hypertension.  9. COPD  10. Constipation  11. Hyperglycemia  12. CLL - patient typically gets monthly IVIG, which will be held at [facility] for cost issues.  13. Malnutrition   14. Anxiety    Transition to Facility:  Facility Name: [facility]  Contact phone number: [phone]    Plan:   Patient will continue to be followed by RN Care Coordinator, Babs Garcia, as scheduled.  RN Care Coordinator will await notification from facility staff informing of patient's discharge plans/needs.   RN Care Coordinator will review chart and outreach to facility staff every 3 weeks and as needed.     Daya Flores RN Care Coordinator  (on behalf of Babs Garcia RN [facility])  [facility]  Phone: [phone]  Email: [email or web address]

## 2019-07-25 NOTE — LETTER
To:  [facility]    Attn:   Facility s         Patient s name:     Marie Kline                                                                                Patient s :      [id number]                              Admission date:   2019       Dear ,      Please contact Babs Garcia RN CCC [facility] at [phone],  when the patient is going to be discharged or move to long term care. If the patient is being discharged with Home Care services, please provide the name of the agency, the discharge date, disposition and main diagnosis.    If a care conference is going to be held, please let the RN CCC know as well.      Thank you!        Daya Flores RN Clinic Care Coordinator  (on behalf of Babs Garcia RN CCC)  e-mail: [email or web address]

## 2019-07-25 NOTE — LETTER
7/25/2019        RE: Marie Kline  [address]  [address]  [facility] [address]        [facility]  PRIMARY CARE PROVIDER AND CLINIC:  Piper Kiser MD, [address] / [facility] [address]  Chief Complaint   Patient presents with     Hospital F/U     [place] Medical Record Number:  [id number]  Place of Service where encounter took place:  [facility]  (S) [[id number]]    Marie Kline  is a 87 year old  (4/28/1932), admitted to the above facility from  [facility]. Hospital stay 7/15/19 through 7/24/19..  Admitted to this facility for  rehab, medical management and nursing care.    HPI:    HPI information obtained from: facility chart records, facility staff, patient report and [facility] chart review.   Brief Summary of Hospital Course:   87 y.o female with PMH CLL, h/o takosubo cardiomyopathy, CHF, CAD- (EF 30-35%), HTN, COPD- on PRN O2 at home, hypothyroidism admitted to hospital as above after fall with resultant left shoulder fracture, pelvic fracture with adjacent hematoma with mass affect on urinary bladder, multiple skin tears and closed head injury and scalp hematoma. Orthopedics consulted and recommended nonoperative management for shoulder fracture. Fever noted inpatient with max 103. Blood cultures and UA negative, CXR revealed RLL PNA Tx with 5 days meronpenem and now completing oral Augmentin.    Updates on Status Since Skilled nursing Admission:     Patient alert, slight anxious and reporting pain with most movement. Continues on scheduled and prn oxycodone and acetaminophen but reports does still have breakthrough pain. Wounds assessed with nursing and unfortunately dressings were dried on so took quite a bit of time and toll on patient to soak and slowly remove dressings. Patient tolerated fairly well and was premedicated. Patient denies SOB, chest pain, fever or chills. Nursing reports RA O2 saturations in the 80s yet. So  patient does remain on supplemental O2. VS reviewed.     CODE STATUS/ADVANCE DIRECTIVES DISCUSSION:   CPR/Full code   Patient's living condition: lives alone  ALLERGIES: Diagnostic x-ray materials; Contrast dye; Prolia [denosumab]; and Wound dressing adhesive  PAST MEDICAL HISTORY:  has a past medical history of Arthritis, Bladder cancer (H), Bladder cancer (H), Cardiomyopathy (H), CLL (chronic lymphocytic leukemia) (H), Congestive heart failure (H) (10/24/2014), COPD (chronic obstructive pulmonary disease) (H), Hypertension, Hypothyroid, Mumps, Myocardial infarction (H) (10/2014), Pulmonary edema cardiac cause (H) (10/08/2014), and Tricuspid valve disorders, 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 She also has no past medical history of Asymptomatic human immunodeficiency virus (HIV) infection status (H), Cerebral palsy (H), Chronic infection, Complication of anesthesia, Congenital renal agenesis and dysgenesis, Goiter, Gout, Hernia, abdominal, History of blood transfusion, History of spinal cord injury, History of thrombophlebitis, Horseshoe kidney, Hydrocephalus, Malignant hyperthermia, Palpitations, Parkinsons disease (H), Sleep apnea, Spider veins, Spina bifida (H), STD (sexually transmitted disease), Tethered cord (H), or Tuberculosis.  PAST SURGICAL HISTORY:   has a past surgical history that includes Coronary Angiography Adult Order (10/2014); Cystoscopy, biopsy bladder, combined (N/A, 2/9/2016); Cystoscopy, transurethral resection (TUR) tumor bladder, combined (N/A, 2/9/2016); Esophagoscopy, gastroscopy, duodenoscopy (EGD), combined (N/A, 6/22/2016); Cystoscopy; Bladder surgery; Biopsy Lymph Node Inguinal (Right, 10/23/2018); Open reduction internal fixation hip bipolar (Left, 11/19/2018); and Left Heart Cath (N/A, 12/11/2018).  FAMILY HISTORY: family history includes Cancer in her father; Cerebrovascular Disease in her mother.  SOCIAL HISTORY:   reports that she quit smoking about 23 years ago. Her smoking  use included cigarettes. She has never used smokeless tobacco. She reports that she does not drink alcohol or use drugs.    Post Discharge Medication Reconciliation Status: discharge medications reconciled and changed, per note/orders (see AVS)    Current Outpatient Medications   Medication Sig Dispense Refill     acetaminophen (TYLENOL) 500 MG tablet Take 2 tablets (1,000 mg) by mouth every 8 hours For one week scheduled then change to prn       albuterol (PROAIR HFA/PROVENTIL HFA/VENTOLIN HFA) 108 (90 BASE) MCG/ACT Inhaler Inhale 2 puffs into the lungs every 6 hours as needed for wheezing 1 Inhaler 8     albuterol (PROVENTIL) (2.5 MG/3ML) 0.083% neb solution Take 2.5 mg by nebulization every 6 hours as needed for shortness of breath / dyspnea or wheezing       ALPRAZolam (XANAX) 0.25 MG tablet Take 1 tablet (0.25 mg) by mouth 2 times daily as needed for anxiety 20 tablet 0     amoxicillin-clavulanate (AUGMENTIN) 875-125 MG tablet Take 1 tablet by mouth 2 times daily for 5 doses 5 tablet 0     aspirin 81 MG EC tablet Take 81 mg by mouth daily       atorvastatin (LIPITOR) 20 MG tablet Take 1 tablet (20 mg) by mouth daily 90 tablet 4     bisacodyl (DULCOLAX) 5 MG EC tablet Take 1 tablet (5 mg) by mouth daily as needed for constipation       budesonide-formoterol (SYMBICORT) 80-4.5 MCG/ACT Inhaler Inhale 2 puffs into the lungs 2 times daily       calcium carbonate-vitamin D (OS-CARLOS) 500-400 MG-UNIT tablet Take 1 tablet by mouth daily       diclofenac (VOLTAREN) 1 % topical gel Apply 2 g topically 4 times daily as needed for moderate pain       diclofenac (VOLTAREN) 1 % topical gel Place 2-4 g onto the skin 3 times daily       ferrous sulfate (IRON) 325 (65 FE) MG tablet Take 325 mg by mouth daily (with breakfast)        furosemide (LASIX) 20 MG tablet Take 1 tablet (20 mg) by mouth daily 30 tablet 0     gabapentin (NEURONTIN) 300 MG capsule Take 300 mg by mouth 2 times daily       Immune Globulin, Human, (OCTAGAM) 5  GM/100ML SOLN 300 mg/kg into the vein every 30 days    Hold this medication while in TCU-resume at discharge from TCU       ipratropium - albuterol 0.5 mg/2.5 mg/3 mL (DUONEB) 0.5-2.5 (3) MG/3ML neb solution Take 1 vial (3 mLs) by nebulization 4 times daily X one week then can change to prn       levothyroxine (SYNTHROID/LEVOTHROID) 75 MCG tablet Take 1 tablet (75 mcg) by mouth daily 90 tablet 4     Lidocaine (LIDOCARE) 4 % Patch Place 2 patches onto the skin every 24 hours To prevent lidocaine toxicity, patient should be patch free for 12 hrs daily.       metoprolol succinate ER (TOPROL-XL) 25 MG 24 hr tablet Take 1 tablet (25 mg) by mouth daily 90 tablet 4     Multiple Vitamins-Minerals (MULTIVITAMIN GUMMIES ADULT PO) Take 2 tablets by mouth daily        oxyCODONE (ROXICODONE) 5 MG tablet Take 2.5 mg by mouth every 4 hours as needed for severe pain       oxyCODONE (ROXICODONE) 5 MG tablet Take 0.5 tablets (2.5 mg) by mouth every 4 hours X one week, then decrease to q 6 hours x one week, then to q 8 hours x one week, then to q 12 hours x one week then discontinue this order and continue only with prn oxycodone and have TCU MD/NH wean the prn oxy 60 tablet 0     polyethylene glycol (MIRALAX/GLYCOLAX) packet Take 17 g by mouth 2 times daily       senna-docusate (SENOKOT-S/PERICOLACE) 8.6-50 MG tablet Take 2 tablets by mouth 2 times daily       sertraline (ZOLOFT) 50 MG tablet Take 1 tablet (50 mg) by mouth daily 90 tablet 1     traZODone (DESYREL) 50 MG tablet Take 2 tablets (100 mg) by mouth At Bedtime 90 tablet 0     [START ON 8/24/2019] oxyCODONE (ROXICODONE) 5 MG tablet Take 0.5-1 tablets (2.5-5 mg) by mouth every 4 hours as needed for moderate to severe pain 15 tablet 0       ROS:  10 point ROS of systems including Constitutional, Eyes, Respiratory, Cardiovascular, Gastroenterology, Genitourinary, Integumentary, Musculoskeletal, Psychiatric were all negative except for pertinent positives noted in my  "HPI.    Vitals:  /73   Pulse 87   Temp 97.9  F (36.6  C)   Resp 18   Ht 1.499 m (4' 11\")   Wt 54.2 kg (119 lb 6.4 oz)   SpO2 91%   BMI 24.12 kg/m     Exam:    GENERAL APPEARANCE: Alert, in no distress   ENT: Mouth and posterior oropharynx normal, moist mucous membranes   EYES: EOM, conjunctivae, lids, pupils and irises normal   NECK: No adenopathy,masses or thyromegaly   RESP: respiratory effort and palpation of chest normal, Lung decreased in bases  CV: Palpation and auscultation of heart done , regular rate and rhythm, no murmur, rub, or gallop, peripheral edema - edema to left elbow/upper arm  ABDOMEN: normal bowel sounds, soft, nontender, no hepatosplenomegaly or other masses   : palpation of bladder WNL   M/S: Gait and station normal   Digits and nails normal - left shoulder in a sling, distal CMS +; MA other extremities- generalized weakness  SKIN: Inspection of skin and subcutaneous tissue baseline, Palpation of skin and subcutaneous tissue baseline, large jagged skin tear to LLE- some folded tissue and denuded areas with viable flap in place, cleaned and steri strips applies where able by wound nurse. Skin tear to elbow- edema d/t dependent position and fracture, cleansed and steri strips applied where able. Small skin tear right shoulder, cleansed and dressing applied. All wounds clean and surrounding tissue clear.   NEURO: Cranial nerves 2-12 are normal tested and grossly at patient's baseline, Examination of sensation by touch normal   PSYCH: oriented X 3, affect and mood normal             Lab/Diagnostic data:  Recent labs in [facility] reviewed by me today.     ASSESSMENT/PLAN:  Shoulder fracture  - NWB, immobilize  - assess distal CMS q shift.   - f/w ortho 2-4 weeks    Closed nondisplaced fracture of pelvis with routine healing, unspecified part of pelvis, subsequent encounter  - WBAT  - sched and prn low dose oxycodone and acetaminophen,   Attempting to balance r/b of med regimen- pain " relief vs risk of harm d/t side effect profile. Also has prn diclofenac and low dose gabapentin. Will increase Gabapentin to 300 mg TID from 300 mg BID. Also has lidocaine patches to low back, buttocks   - PT/OT  - follow clinically    Pelvic hematoma, female  - mass effect pressing on urinary bladder  - follow HGB, observe s/s bleeding  - keep Rees catheter in for 2 weeks then trial void  - Rees cares per nursing per PARKER    Closed head injury, subsequent encounter-   - observe neurocog and scalp hematoma daily      Multiple skin tears  - wound nurse looked at, skin approximated with steri strips where able and wounds cleaned. Change nonstick and gauze changes to BID- keep clean and dry until healed  - Perez wound MD referral  - dietician referral   - APM  - follow clinically    Pneumonia of left lung due to infectious organism, unspecified part of lung  COPD exacerbation (H)  - wears O2 just prn at home- requiring it currently, afebrile  - complete Augmentin course, and scheduled Duonebs  - patient only uses nebulizers prn at home  - wean O2  - VS per unit protocol    Coronary artery disease involving native coronary artery of native heart without angina pectoris  - no active s/s-  - continues on metoprolol, statin and low dose asa    Nonischemic cardiomyopathy (H)  Chronic congestive heart failure, unspecified heart failure type (H)  - Appears essentially euvolemic  - EF 30-35% on Echo last  - continue on BB  - follow weights and VS    CLL (chronic lymphocytic leukemia) (H)  - WBC ,000 and with thrombocytopenia  - dx 2007- no chemo since 2017 2/2 overall health  - f/w Dr. Ivory      Anxiety   prn alprazolam added inpatient, not utilized in TCU   - will discontinue and add daily prn dose of hydroxyzine    Fall, subsequent encounter  Physical deconditioning   2/2 above  - [place], [place] supportive  - PT/OT  - ongoing discharge planning, SW follow and care conferences per unit protocol         Orders  written by provider at facility    Total unit floor time 90 minutes- Time consisted of examination of patient, review of patient medical records, labs, imaging and current admission orders/clarification of orders and documentation. 50 minutes spent on counseling and care coordination with patient, family and nursing regarding clinical status, plan of care and interventions as outlined above.    Electronically signed by:  NOHEMI Zee CNP                           Sincerely,        NOHEMI Zee CNP

## 2019-07-25 NOTE — PROGRESS NOTES
[place] [facility]  PRIMARY CARE PROVIDER AND CLINIC:  Piper Kiser MD, [address] [facility] [address]  Chief Complaint   Patient presents with     Hospital F/U     [place] Medical Record Number:  [id number]  Place of Service where encounter took place:  [facility]  (Critical access hospital) [[id number]]    Marie Kline  is a 87 year old  (4/28/1932), admitted to the above facility from  [facility]. Hospital stay 7/15/19 through 7/24/19..  Admitted to this facility for  rehab, medical management and nursing care.    HPI:    HPI information obtained from: facility chart records, facility staff, patient report and [facility] chart review.   Brief Summary of Hospital Course:   87 y.o female with PMH CLL, h/o takosubo cardiomyopathy, CHF, CAD- (EF 30-35%), HTN, COPD- on PRN O2 at home, hypothyroidism admitted to hospital as above after fall with resultant left shoulder fracture, pelvic fracture with adjacent hematoma with mass affect on urinary bladder, multiple skin tears and closed head injury and scalp hematoma. Orthopedics consulted and recommended nonoperative management for shoulder fracture. Fever noted inpatient with max 103. Blood cultures and UA negative, CXR revealed RLL PNA Tx with 5 days meronpenem and now completing oral Augmentin.    Updates on Status Since Skilled nursing Admission:     Patient alert, slight anxious and reporting pain with most movement. Continues on scheduled and prn oxycodone and acetaminophen but reports does still have breakthrough pain. Wounds assessed with nursing and unfortunately dressings were dried on so took quite a bit of time and toll on patient to soak and slowly remove dressings. Patient tolerated fairly well and was premedicated. Patient denies SOB, chest pain, fever or chills. Nursing reports RA O2 saturations in the 80s yet. So patient does remain on supplemental O2. VS reviewed.     CODE STATUS/ADVANCE DIRECTIVES DISCUSSION:    CPR/Full code   Patient's living condition: lives alone  ALLERGIES: Diagnostic x-ray materials; Contrast dye; Prolia [denosumab]; and Wound dressing adhesive  PAST MEDICAL HISTORY:  has a past medical history of Arthritis, Bladder cancer (H), Bladder cancer (H), Cardiomyopathy (H), CLL (chronic lymphocytic leukemia) (H), Congestive heart failure (H) (10/24/2014), COPD (chronic obstructive pulmonary disease) (H), Hypertension, Hypothyroid, Mumps, Myocardial infarction (H) (10/2014), Pulmonary edema cardiac cause (H) (10/08/2014), and Tricuspid valve disorders, 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 She also has no past medical history of Asymptomatic human immunodeficiency virus (HIV) infection status (H), Cerebral palsy (H), Chronic infection, Complication of anesthesia, Congenital renal agenesis and dysgenesis, Goiter, Gout, Hernia, abdominal, History of blood transfusion, History of spinal cord injury, History of thrombophlebitis, Horseshoe kidney, Hydrocephalus, Malignant hyperthermia, Palpitations, Parkinsons disease (H), Sleep apnea, Spider veins, Spina bifida (H), STD (sexually transmitted disease), Tethered cord (H), or Tuberculosis.  PAST SURGICAL HISTORY:   has a past surgical history that includes Coronary Angiography Adult Order (10/2014); Cystoscopy, biopsy bladder, combined (N/A, 2/9/2016); Cystoscopy, transurethral resection (TUR) tumor bladder, combined (N/A, 2/9/2016); Esophagoscopy, gastroscopy, duodenoscopy (EGD), combined (N/A, 6/22/2016); Cystoscopy; Bladder surgery; Biopsy Lymph Node Inguinal (Right, 10/23/2018); Open reduction internal fixation hip bipolar (Left, 11/19/2018); and Left Heart Cath (N/A, 12/11/2018).  FAMILY HISTORY: family history includes Cancer in her father; Cerebrovascular Disease in her mother.  SOCIAL HISTORY:   reports that she quit smoking about 23 years ago. Her smoking use included cigarettes. She has never used smokeless tobacco. She reports that she does not drink  alcohol or use drugs.    Post Discharge Medication Reconciliation Status: discharge medications reconciled and changed, per note/orders (see AVS)    Current Outpatient Medications   Medication Sig Dispense Refill     acetaminophen (TYLENOL) 500 MG tablet Take 2 tablets (1,000 mg) by mouth every 8 hours For one week scheduled then change to prn       albuterol (PROAIR HFA/PROVENTIL HFA/VENTOLIN HFA) 108 (90 BASE) MCG/ACT Inhaler Inhale 2 puffs into the lungs every 6 hours as needed for wheezing 1 Inhaler 8     albuterol (PROVENTIL) (2.5 MG/3ML) 0.083% neb solution Take 2.5 mg by nebulization every 6 hours as needed for shortness of breath / dyspnea or wheezing       ALPRAZolam (XANAX) 0.25 MG tablet Take 1 tablet (0.25 mg) by mouth 2 times daily as needed for anxiety 20 tablet 0     amoxicillin-clavulanate (AUGMENTIN) 875-125 MG tablet Take 1 tablet by mouth 2 times daily for 5 doses 5 tablet 0     aspirin 81 MG EC tablet Take 81 mg by mouth daily       atorvastatin (LIPITOR) 20 MG tablet Take 1 tablet (20 mg) by mouth daily 90 tablet 4     bisacodyl (DULCOLAX) 5 MG EC tablet Take 1 tablet (5 mg) by mouth daily as needed for constipation       budesonide-formoterol (SYMBICORT) 80-4.5 MCG/ACT Inhaler Inhale 2 puffs into the lungs 2 times daily       calcium carbonate-vitamin D (OS-CARLOS) 500-400 MG-UNIT tablet Take 1 tablet by mouth daily       diclofenac (VOLTAREN) 1 % topical gel Apply 2 g topically 4 times daily as needed for moderate pain       diclofenac (VOLTAREN) 1 % topical gel Place 2-4 g onto the skin 3 times daily       ferrous sulfate (IRON) 325 (65 FE) MG tablet Take 325 mg by mouth daily (with breakfast)        furosemide (LASIX) 20 MG tablet Take 1 tablet (20 mg) by mouth daily 30 tablet 0     gabapentin (NEURONTIN) 300 MG capsule Take 300 mg by mouth 2 times daily       Immune Globulin, Human, (OCTAGAM) 5 GM/100ML SOLN 300 mg/kg into the vein every 30 days    Hold this medication while in TCU-resume at  "discharge from TCU       ipratropium - albuterol 0.5 mg/2.5 mg/3 mL (DUONEB) 0.5-2.5 (3) MG/3ML neb solution Take 1 vial (3 mLs) by nebulization 4 times daily X one week then can change to prn       levothyroxine (SYNTHROID/LEVOTHROID) 75 MCG tablet Take 1 tablet (75 mcg) by mouth daily 90 tablet 4     Lidocaine (LIDOCARE) 4 % Patch Place 2 patches onto the skin every 24 hours To prevent lidocaine toxicity, patient should be patch free for 12 hrs daily.       metoprolol succinate ER (TOPROL-XL) 25 MG 24 hr tablet Take 1 tablet (25 mg) by mouth daily 90 tablet 4     Multiple Vitamins-Minerals (MULTIVITAMIN GUMMIES ADULT PO) Take 2 tablets by mouth daily        oxyCODONE (ROXICODONE) 5 MG tablet Take 2.5 mg by mouth every 4 hours as needed for severe pain       oxyCODONE (ROXICODONE) 5 MG tablet Take 0.5 tablets (2.5 mg) by mouth every 4 hours X one week, then decrease to q 6 hours x one week, then to q 8 hours x one week, then to q 12 hours x one week then discontinue this order and continue only with prn oxycodone and have TCU MD/NH wean the prn oxy 60 tablet 0     polyethylene glycol (MIRALAX/GLYCOLAX) packet Take 17 g by mouth 2 times daily       senna-docusate (SENOKOT-S/PERICOLACE) 8.6-50 MG tablet Take 2 tablets by mouth 2 times daily       sertraline (ZOLOFT) 50 MG tablet Take 1 tablet (50 mg) by mouth daily 90 tablet 1     traZODone (DESYREL) 50 MG tablet Take 2 tablets (100 mg) by mouth At Bedtime 90 tablet 0     [START ON 8/24/2019] oxyCODONE (ROXICODONE) 5 MG tablet Take 0.5-1 tablets (2.5-5 mg) by mouth every 4 hours as needed for moderate to severe pain 15 tablet 0       ROS:  10 point ROS of systems including Constitutional, Eyes, Respiratory, Cardiovascular, Gastroenterology, Genitourinary, Integumentary, Musculoskeletal, Psychiatric were all negative except for pertinent positives noted in my HPI.    Vitals:  /73   Pulse 87   Temp 97.9  F (36.6  C)   Resp 18   Ht 1.499 m (4' 11\")   Wt 54.2 " kg (119 lb 6.4 oz)   SpO2 91%   BMI 24.12 kg/m    Exam:    GENERAL APPEARANCE: Alert, in no distress   ENT: Mouth and posterior oropharynx normal, moist mucous membranes   EYES: EOM, conjunctivae, lids, pupils and irises normal   NECK: No adenopathy,masses or thyromegaly   RESP: respiratory effort and palpation of chest normal, Lung decreased in bases  CV: Palpation and auscultation of heart done , regular rate and rhythm, no murmur, rub, or gallop, peripheral edema - edema to left elbow/upper arm  ABDOMEN: normal bowel sounds, soft, nontender, no hepatosplenomegaly or other masses   : palpation of bladder WNL   M/S: Gait and station normal   Digits and nails normal - left shoulder in a sling, distal CMS +; MA other extremities- generalized weakness  SKIN: Inspection of skin and subcutaneous tissue baseline, Palpation of skin and subcutaneous tissue baseline, large jagged skin tear to LLE- some folded tissue and denuded areas with viable flap in place, cleaned and steri strips applies where able by wound nurse. Skin tear to elbow- edema d/t dependent position and fracture, cleansed and steri strips applied where able. Small skin tear right shoulder, cleansed and dressing applied. All wounds clean and surrounding tissue clear.   NEURO: Cranial nerves 2-12 are normal tested and grossly at patient's baseline, Examination of sensation by touch normal   PSYCH: oriented X 3, affect and mood normal             Lab/Diagnostic data:  Recent labs in [facility] reviewed by me today.     ASSESSMENT/PLAN:  Shoulder fracture  - NWB, immobilize  - assess distal CMS q shift.   - f/w ortho 2-4 weeks    Closed nondisplaced fracture of pelvis with routine healing, unspecified part of pelvis, subsequent encounter  - WBAT  - sched and prn low dose oxycodone and acetaminophen,   Attempting to balance r/b of med regimen- pain relief vs risk of harm d/t side effect profile. Also has prn diclofenac and low dose gabapentin. Will increase  Gabapentin to 300 mg TID from 300 mg BID. Also has lidocaine patches to low back, buttocks   - PT/OT  - follow clinically    Pelvic hematoma, female  - mass effect pressing on urinary bladder  - follow HGB, observe s/s bleeding  - keep Rees catheter in for 2 weeks then trial void  - Rees cares per nursing per PARKER    Closed head injury, subsequent encounter-   - observe neurocog and scalp hematoma daily      Multiple skin tears  - wound nurse looked at, skin approximated with steri strips where able and wounds cleaned. Change nonstick and gauze changes to BID- keep clean and dry until healed  - Perez wound MD referral  - dietician referral   - APM  - follow clinically    Pneumonia of left lung due to infectious organism, unspecified part of lung  COPD exacerbation (H)  - wears O2 just prn at home- requiring it currently, afebrile  - complete Augmentin course, and scheduled Duonebs  - patient only uses nebulizers prn at home  - wean O2  - VS per unit protocol    Coronary artery disease involving native coronary artery of native heart without angina pectoris  - no active s/s-  - continues on metoprolol, statin and low dose asa    Nonischemic cardiomyopathy (H)  Chronic congestive heart failure, unspecified heart failure type (H)  - Appears essentially euvolemic  - EF 30-35% on Echo last  - continue on BB  - follow weights and VS    CLL (chronic lymphocytic leukemia) (H)  - WBC ,000 and with thrombocytopenia  - dx 2007- no chemo since 2017 2/2 overall health  - f/w Dr. Ivory      Anxiety   prn alprazolam added inpatient, not utilized in TCU   - will discontinue and add daily prn dose of hydroxyzine    Fall, subsequent encounter  Physical deconditioning   2/2 above  - [place], [place]  - PT/OT  - ongoing discharge planning,  follow and care conferences per unit protocol         Orders written by provider at facility    Total unit floor time 90 minutes- Time consisted of examination of patient,  review of patient medical records, labs, imaging and current admission orders/clarification of orders and documentation. 50 minutes spent on counseling and care coordination with patient, family and nursing regarding clinical status, plan of care and interventions as outlined above.    Electronically signed by:  NOHEMI Zee CNP

## 2019-07-26 ENCOUNTER — HOSPITAL LABORATORY (OUTPATIENT)
Dept: OTHER | Facility: CLINIC | Age: 84
End: 2019-07-26

## 2019-07-26 ENCOUNTER — DOCUMENTATION ONLY (OUTPATIENT)
Dept: OTHER | Facility: CLINIC | Age: 84
End: 2019-07-26

## 2019-07-26 LAB
ANION GAP SERPL CALCULATED.3IONS-SCNC: 1 MMOL/L (ref 3–14)
BUN SERPL-MCNC: 16 MG/DL (ref 7–30)
CALCIUM SERPL-MCNC: 8.4 MG/DL (ref 8.5–10.1)
CHLORIDE SERPL-SCNC: 88 MMOL/L (ref 94–109)
CO2 SERPL-SCNC: 41 MMOL/L (ref 20–32)
CREAT SERPL-MCNC: 0.52 MG/DL (ref 0.52–1.04)
ERYTHROCYTE [DISTWIDTH] IN BLOOD BY AUTOMATED COUNT: 19.2 % (ref 10–15)
GFR SERPL CREATININE-BSD FRML MDRD: 86 ML/MIN/{1.73_M2}
GLUCOSE SERPL-MCNC: 114 MG/DL (ref 70–99)
HCT VFR BLD AUTO: 30 % (ref 35–47)
HGB BLD-MCNC: 9.4 G/DL (ref 11.7–15.7)
MCH RBC QN AUTO: 27.9 PG (ref 26.5–33)
MCHC RBC AUTO-ENTMCNC: 31.3 G/DL (ref 31.5–36.5)
MCV RBC AUTO: 89 FL (ref 78–100)
PLATELET # BLD AUTO: 263 10E9/L (ref 150–450)
POTASSIUM SERPL-SCNC: 3.9 MMOL/L (ref 3.4–5.3)
RBC # BLD AUTO: 3.37 10E12/L (ref 3.8–5.2)
SODIUM SERPL-SCNC: 130 MMOL/L (ref 133–144)
WBC # BLD AUTO: 73 10E9/L (ref 4–11)

## 2019-07-29 ENCOUNTER — HOSPITAL LABORATORY (OUTPATIENT)
Dept: OTHER | Facility: CLINIC | Age: 84
End: 2019-07-29

## 2019-07-29 ENCOUNTER — NURSING HOME VISIT (OUTPATIENT)
Dept: GERIATRICS | Facility: CLINIC | Age: 84
End: 2019-07-29
Payer: MEDICARE

## 2019-07-29 VITALS
HEART RATE: 80 BPM | HEIGHT: 59 IN | SYSTOLIC BLOOD PRESSURE: 118 MMHG | RESPIRATION RATE: 17 BRPM | OXYGEN SATURATION: 90 % | BODY MASS INDEX: 22.13 KG/M2 | WEIGHT: 109.8 LBS | DIASTOLIC BLOOD PRESSURE: 66 MMHG | TEMPERATURE: 98 F

## 2019-07-29 DIAGNOSIS — M54.9 BACK PAIN WITH RADIATION: ICD-10-CM

## 2019-07-29 DIAGNOSIS — C91.10 CLL (CHRONIC LYMPHOCYTIC LEUKEMIA) (H): ICD-10-CM

## 2019-07-29 DIAGNOSIS — S42.92XD CLOSED FRACTURE OF LEFT SHOULDER WITH ROUTINE HEALING, SUBSEQUENT ENCOUNTER: Primary | ICD-10-CM

## 2019-07-29 DIAGNOSIS — M48.00 CENTRAL SPINAL STENOSIS: ICD-10-CM

## 2019-07-29 LAB
ANION GAP SERPL CALCULATED.3IONS-SCNC: 3 MMOL/L (ref 3–14)
BASOPHILS # BLD AUTO: 0.1 10E9/L (ref 0–0.2)
BASOPHILS NFR BLD AUTO: 0.1 %
BUN SERPL-MCNC: 15 MG/DL (ref 7–30)
CALCIUM SERPL-MCNC: 8.9 MG/DL (ref 8.5–10.1)
CHLORIDE SERPL-SCNC: 93 MMOL/L (ref 94–109)
CO2 SERPL-SCNC: 37 MMOL/L (ref 20–32)
CREAT SERPL-MCNC: 0.61 MG/DL (ref 0.52–1.04)
DIFFERENTIAL METHOD BLD: ABNORMAL
EOSINOPHIL # BLD AUTO: 0.1 10E9/L (ref 0–0.7)
EOSINOPHIL NFR BLD AUTO: 0.1 %
ERYTHROCYTE [DISTWIDTH] IN BLOOD BY AUTOMATED COUNT: 19.4 % (ref 10–15)
GFR SERPL CREATININE-BSD FRML MDRD: 82 ML/MIN/{1.73_M2}
GLUCOSE SERPL-MCNC: 89 MG/DL (ref 70–99)
HCT VFR BLD AUTO: 28.3 % (ref 35–47)
HGB BLD-MCNC: 8.7 G/DL (ref 11.7–15.7)
IMM GRANULOCYTES # BLD: 0.1 10E9/L (ref 0–0.4)
IMM GRANULOCYTES NFR BLD: 0.1 %
LYMPHOCYTES # BLD AUTO: 58.2 10E9/L (ref 0.8–5.3)
LYMPHOCYTES NFR BLD AUTO: 92.7 %
MCH RBC QN AUTO: 27.6 PG (ref 26.5–33)
MCHC RBC AUTO-ENTMCNC: 30.7 G/DL (ref 31.5–36.5)
MCV RBC AUTO: 90 FL (ref 78–100)
MONOCYTES # BLD AUTO: 0.4 10E9/L (ref 0–1.3)
MONOCYTES NFR BLD AUTO: 0.7 %
NEUTROPHILS # BLD AUTO: 4 10E9/L (ref 1.6–8.3)
NEUTROPHILS NFR BLD AUTO: 6.3 %
NRBC # BLD AUTO: 0 10*3/UL
NRBC BLD AUTO-RTO: 0 /100
PLATELET # BLD AUTO: 235 10E9/L (ref 150–450)
PLATELET # BLD EST: ABNORMAL 10*3/UL
POIKILOCYTOSIS BLD QL SMEAR: ABNORMAL
POTASSIUM SERPL-SCNC: 4 MMOL/L (ref 3.4–5.3)
RBC # BLD AUTO: 3.15 10E12/L (ref 3.8–5.2)
SMUDGE CELLS BLD QL SMEAR: PRESENT
SODIUM SERPL-SCNC: 133 MMOL/L (ref 133–144)
WBC # BLD AUTO: 62.9 10E9/L (ref 4–11)

## 2019-07-29 PROCEDURE — 99309 SBSQ NF CARE MODERATE MDM 30: CPT | Performed by: NURSE PRACTITIONER

## 2019-07-29 RX ORDER — NYSTATIN 100000/ML
5 SUSPENSION, ORAL (FINAL DOSE FORM) ORAL 4 TIMES DAILY
COMMUNITY
Start: 2019-07-25 | End: 2019-08-18

## 2019-07-29 RX ORDER — HYDROXYZINE HYDROCHLORIDE 10 MG/1
10 TABLET, FILM COATED ORAL DAILY PRN
COMMUNITY
End: 2019-09-05

## 2019-07-29 ASSESSMENT — MIFFLIN-ST. JEOR: SCORE: 838.68

## 2019-07-29 NOTE — PROGRESS NOTES
[place] [facility]  [place] Medical Record Number:  [id number]  Place of Service where encounter took place:  [facility]  (S) [[id number]]  Chief Complaint   Patient presents with     Nursing Home Acute       HPI:    Marie Kline  is a 87 year old (4/28/1932), who is being seen today for an episodic care visit.  HPI information obtained from: facility chart records, facility staff, patient report and [facility] chart review. Today's concern is:  Closed fracture of left shoulder with routine healing, subsequent encounter  Central spinal stenosis  Back pain with radiation  Pain uncontrolled over the weekend but seems to be better today because her nurse brings her pain medication in on time.  She also has some anxiety which also play into the mix of how well her pain is managed.  Her current orders show she has a scheduled Oxycodone and PRN.  She is quite small, but does not get overly sedated.  She has acute on top of chronic pain.  She denies constipation or any adverse affects from her pain medication.  Also has a new skin tear on left leg.    CLL (chronic lymphocytic leukemia) (HCC)  WBC today is down to 62.9.  Labs have been faxed to her Oncologist.  She feels tired but continues to work with therapy.          Past Medical and Surgical History reviewed in Epic today.    MEDICATIONS:  Current Outpatient Medications   Medication Sig Dispense Refill     acetaminophen (TYLENOL) 500 MG tablet Take 2 tablets (1,000 mg) by mouth every 8 hours For one week scheduled then change to prn       albuterol (PROAIR HFA/PROVENTIL HFA/VENTOLIN HFA) 108 (90 BASE) MCG/ACT Inhaler Inhale 2 puffs into the lungs every 6 hours as needed for wheezing 1 Inhaler 8     albuterol (PROVENTIL) (2.5 MG/3ML) 0.083% neb solution Take 2.5 mg by nebulization every 6 hours as needed for shortness of breath / dyspnea or wheezing       aspirin 81 MG EC tablet Take 81 mg by mouth daily       atorvastatin (LIPITOR) 20 MG  tablet Take 1 tablet (20 mg) by mouth daily 90 tablet 4     bisacodyl (DULCOLAX) 5 MG EC tablet Take 1 tablet (5 mg) by mouth daily as needed for constipation       budesonide-formoterol (SYMBICORT) 80-4.5 MCG/ACT Inhaler Inhale 2 puffs into the lungs 2 times daily       calcium carbonate-vitamin D (OS-CARLOS) 500-400 MG-UNIT tablet Take 1 tablet by mouth daily       diclofenac (VOLTAREN) 1 % topical gel Apply 2 g topically 4 times daily as needed for moderate pain       ferrous sulfate (IRON) 325 (65 FE) MG tablet Take 325 mg by mouth daily (with breakfast)        furosemide (LASIX) 20 MG tablet Take 1 tablet (20 mg) by mouth daily 30 tablet 0     gabapentin (NEURONTIN) 300 MG capsule Take 300 mg by mouth 2 times daily       hydrOXYzine (ATARAX) 10 MG tablet Take 10 mg by mouth daily as needed for itching Give 10 mg by mouth as needed for anxiety/pain QD PRN       Immune Globulin, Human, (OCTAGAM) 5 GM/100ML SOLN 300 mg/kg into the vein every 30 days    Hold this medication while in TCU-resume at discharge from TCU       ipratropium - albuterol 0.5 mg/2.5 mg/3 mL (DUONEB) 0.5-2.5 (3) MG/3ML neb solution Take 1 vial (3 mLs) by nebulization 4 times daily X one week then can change to prn       levothyroxine (SYNTHROID/LEVOTHROID) 75 MCG tablet Take 1 tablet (75 mcg) by mouth daily 90 tablet 4     Lidocaine (LIDOCARE) 4 % Patch Place 2 patches onto the skin every 24 hours To prevent lidocaine toxicity, patient should be patch free for 12 hrs daily.       metoprolol succinate ER (TOPROL-XL) 25 MG 24 hr tablet Take 1 tablet (25 mg) by mouth daily 90 tablet 4     Multiple Vitamins-Minerals (MULTIVITAMIN GUMMIES ADULT PO) Take 2 tablets by mouth daily        nystatin (MYCOSTATIN) 490106 UNIT/ML suspension Take 5 mLs by mouth 4 times daily X 7 days, swish and swallow, for thrusgh       oxyCODONE (ROXICODONE) 5 MG tablet Take 2.5 mg by mouth every 4 hours as needed for severe pain       oxyCODONE (ROXICODONE) 5 MG tablet Take  "0.5 tablets (2.5 mg) by mouth every 4 hours X one week, then decrease to q 6 hours x one week, then to q 8 hours x one week, then to q 12 hours x one week then discontinue this order and continue only with prn oxycodone and have TCU MD/NH wean the prn oxy 60 tablet 0     [START ON 8/24/2019] oxyCODONE (ROXICODONE) 5 MG tablet Take 0.5-1 tablets (2.5-5 mg) by mouth every 4 hours as needed for moderate to severe pain 15 tablet 0     polyethylene glycol (MIRALAX/GLYCOLAX) packet Take 17 g by mouth 2 times daily       senna-docusate (SENOKOT-S/PERICOLACE) 8.6-50 MG tablet Take 2 tablets by mouth 2 times daily       sertraline (ZOLOFT) 50 MG tablet Take 1 tablet (50 mg) by mouth daily 90 tablet 1     traZODone (DESYREL) 50 MG tablet Take 2 tablets (100 mg) by mouth At Bedtime 90 tablet 0         REVIEW OF SYSTEMS:  10 point ROS of systems including Constitutional, Eyes, Respiratory, Cardiovascular, Gastroenterology, Genitourinary, Integumentary, Musculoskeletal, Psychiatric were all negative except for pertinent positives noted in my HPI.    Objective:  /66   Pulse 80   Temp 98  F (36.7  C)   Resp 17   Ht 1.499 m (4' 11\")   Wt 49.8 kg (109 lb 12.8 oz)   SpO2 90%   BMI 22.18 kg/m    Exam:  GENERAL APPEARANCE:  Alert, thin, anxious  RESP:  rales chronic fibrotic changes heard throughout, using accessory muscles  CV:  Palpation and auscultation of heart done , regular rate and rhythm, no murmur, rub, or gallop, no edema  ABDOMEN:  normal bowel sounds, soft, nontender, no hepatosplenomegaly or other masses  M/S:   Gait and station abnormal requires assist with transfers and bedmobility due to left shoulder fracture  Digits and nails abnormal arthritic changes  non wieght bearing left arm  SKIN:  Inspection of skin and subcutaneous tissue baseline, Palpation of skin and subcutaneous tissue baseline, thin, fragile, bruising noted multiple areas both upper and lower extremities.  skin tears are bandaged    Labs: "   Recent labs in [facility] reviewed by me today.   WBC   Date Value Ref Range Status   07/29/2019 62.9 (HH) 4.0 - 11.0 10e9/L Corrected     Comment:     Critical Value called to and read back [facility]  [facility] LC  CORRECTED ON 07/29 AT 1024: PREVIOUSLY REPORTED AS 62.9 .          ASSESSMENT/PLAN:  (S42.92XD) Closed fracture of left shoulder with routine healing, subsequent encounter  (primary encounter diagnosis)  (M48.00) Central spinal stenosis  (M54.9) Back pain with radiation  Comment: uncontrolled pain  Plan: scheduled Oxycodone, instructed her to ask for as needed pain medication if she is having pain >6/10  Bowel protocol while on narcotic pain medication  Ice PRN    (C91.90) CLL (chronic lymphocytic leukemia) (HCC)  Comment: chemo on hold while in the TCU and due to labs and frailty  Plan: ordered WBC with Diff twice weekly   Update Oncology if WBC starts to rise        Electronically signed by:  NOHEMI Cedeño CNP

## 2019-07-31 ENCOUNTER — NURSING HOME VISIT (OUTPATIENT)
Dept: GERIATRICS | Facility: CLINIC | Age: 84
End: 2019-07-31
Payer: MEDICARE

## 2019-07-31 ENCOUNTER — PATIENT OUTREACH (OUTPATIENT)
Dept: CARE COORDINATION | Facility: CLINIC | Age: 84
End: 2019-07-31

## 2019-07-31 VITALS
TEMPERATURE: 98 F | WEIGHT: 108.8 LBS | SYSTOLIC BLOOD PRESSURE: 131 MMHG | RESPIRATION RATE: 16 BRPM | OXYGEN SATURATION: 94 % | HEIGHT: 59 IN | HEART RATE: 72 BPM | BODY MASS INDEX: 21.93 KG/M2 | DIASTOLIC BLOOD PRESSURE: 64 MMHG

## 2019-07-31 DIAGNOSIS — K12.1 MOUTH ULCERS: Primary | ICD-10-CM

## 2019-07-31 DIAGNOSIS — S81.812S: ICD-10-CM

## 2019-07-31 PROCEDURE — 99309 SBSQ NF CARE MODERATE MDM 30: CPT | Performed by: NURSE PRACTITIONER

## 2019-07-31 ASSESSMENT — MIFFLIN-ST. JEOR: SCORE: 834.14

## 2019-07-31 NOTE — PROGRESS NOTES
[facility]  [facility] Record Number:  [id number]  Place of Service where encounter took place:  [facility]  (S) [[id number]]  Chief Complaint   Patient presents with     Nursing Home Acute       HPI:    Marie Kline  is a 87 year old (4/28/1932), who is being seen today for an episodic care visit.  HPI information obtained from: facility chart records, facility staff, patient report and [facility] chart review. Today's concern is:  Mouth ulcers  Patient reports she is being treated for thrush but her mouth has so much pain and she is requesting more pain medications and more medications for her thrush. On assessment, she has ulcerations on her tongue and her left cheek and the roof of her mouth. Discussed that she should leave her top dentures out until her mouth is improving, discussed avoiding acid foods and beverages.    Noninfected skin tear of left lower extremity, sequela  She requested her leg be looked at today. She reports she is worried it is infected. Her skin tear is large and is covered with steristrips, serosanguineous drainage, no redness or warmth, no s/s of infection. Due to her CLL she is at a high risk for complications.       Past Medical and Surgical History reviewed in Epic today.    MEDICATIONS:  Current Outpatient Medications   Medication Sig Dispense Refill     acetaminophen (TYLENOL) 500 MG tablet Take 2 tablets (1,000 mg) by mouth every 8 hours For one week scheduled then change to prn       albuterol (PROAIR HFA/PROVENTIL HFA/VENTOLIN HFA) 108 (90 BASE) MCG/ACT Inhaler Inhale 2 puffs into the lungs every 6 hours as needed for wheezing 1 Inhaler 8     albuterol (PROVENTIL) (2.5 MG/3ML) 0.083% neb solution Take 2.5 mg by nebulization every 6 hours as needed for shortness of breath / dyspnea or wheezing       aspirin 81 MG EC tablet Take 81 mg by mouth daily       atorvastatin (LIPITOR) 20 MG tablet Take 1 tablet (20 mg) by mouth daily 90 tablet 4      bisacodyl (DULCOLAX) 5 MG EC tablet Take 1 tablet (5 mg) by mouth daily as needed for constipation       budesonide-formoterol (SYMBICORT) 80-4.5 MCG/ACT Inhaler Inhale 2 puffs into the lungs 2 times daily       calcium carbonate-vitamin D (OS-CARLOS) 500-400 MG-UNIT tablet Take 1 tablet by mouth daily       diclofenac (VOLTAREN) 1 % topical gel Apply 2 g topically 4 times daily as needed for moderate pain       ferrous sulfate (IRON) 325 (65 FE) MG tablet Take 325 mg by mouth daily (with breakfast)        furosemide (LASIX) 20 MG tablet Take 1 tablet (20 mg) by mouth daily 30 tablet 0     gabapentin (NEURONTIN) 300 MG capsule Take 300 mg by mouth 2 times daily       hydrOXYzine (ATARAX) 10 MG tablet Take 10 mg by mouth daily as needed for itching Give 10 mg by mouth as needed for anxiety/pain QD PRN       Immune Globulin, Human, (OCTAGAM) 5 GM/100ML SOLN 300 mg/kg into the vein every 30 days    Hold this medication while in TCU-resume at discharge from TCU       ipratropium - albuterol 0.5 mg/2.5 mg/3 mL (DUONEB) 0.5-2.5 (3) MG/3ML neb solution Take 1 vial (3 mLs) by nebulization 4 times daily X one week then can change to prn       levothyroxine (SYNTHROID/LEVOTHROID) 75 MCG tablet Take 1 tablet (75 mcg) by mouth daily 90 tablet 4     Lidocaine (LIDOCARE) 4 % Patch Place 2 patches onto the skin every 24 hours To prevent lidocaine toxicity, patient should be patch free for 12 hrs daily.       metoprolol succinate ER (TOPROL-XL) 25 MG 24 hr tablet Take 1 tablet (25 mg) by mouth daily 90 tablet 4     Multiple Vitamins-Minerals (MULTIVITAMIN GUMMIES ADULT PO) Take 2 tablets by mouth daily        nystatin (MYCOSTATIN) 642786 UNIT/ML suspension Take 5 mLs by mouth 4 times daily X 7 days, swish and swallow, for thrusgh       oxyCODONE (ROXICODONE) 5 MG tablet Take 2.5 mg by mouth every 4 hours as needed for severe pain       oxyCODONE (ROXICODONE) 5 MG tablet Take 0.5 tablets (2.5 mg) by mouth every 4 hours X one week,  "then decrease to q 6 hours x one week, then to q 8 hours x one week, then to q 12 hours x one week then discontinue this order and continue only with prn oxycodone and have TCU MD/NH wean the prn oxy 60 tablet 0     [START ON 8/24/2019] oxyCODONE (ROXICODONE) 5 MG tablet Take 0.5-1 tablets (2.5-5 mg) by mouth every 4 hours as needed for moderate to severe pain 15 tablet 0     polyethylene glycol (MIRALAX/GLYCOLAX) packet Take 17 g by mouth 2 times daily       senna-docusate (SENOKOT-S/PERICOLACE) 8.6-50 MG tablet Take 2 tablets by mouth 2 times daily       sertraline (ZOLOFT) 50 MG tablet Take 1 tablet (50 mg) by mouth daily 90 tablet 1     traZODone (DESYREL) 50 MG tablet Take 2 tablets (100 mg) by mouth At Bedtime 90 tablet 0       REVIEW OF SYSTEMS:  4 point ROS including Respiratory, CV, GI and , other than that noted in the HPI,  is negative    Objective:  /64   Pulse 72   Temp 98  F (36.7  C)   Resp 16   Ht 1.499 m (4' 11\")   Wt 49.4 kg (108 lb 12.8 oz)   SpO2 94%   BMI 21.97 kg/m    Exam:  GENERAL APPEARANCE:  Alert, cooperative  ENT:  Mouth and posterior oropharynx normal, moist mucous membranes, [place], throat/mouth:mucous membranes moist, multiple ulcerations on the tongue and roof of her mouth, no white patches noted, poor dentition  RESP:  respiratory effort and palpation of chest normal, diminished breath sounds bilateral bases  CV:  Palpation and auscultation of heart done , regular rate and rhythm, no murmur, rub, or gallop, no edema  ABDOMEN:  normal bowel sounds, soft, nontender, no hepatosplenomegaly or other masses  SKIN:  wound healing well, no signs of infection left lower leg  PSYCH:  oriented X 3, affect and mood normal    Labs:   Recent labs in [facility] reviewed by me today.     ASSESSMENT/PLAN:  (K12.1) Mouth ulcers  (primary encounter diagnosis)  Comment: acute changes  Plan: likely caused by her elevated WBC in the setting of acute illness recovery, will add magic mouth wash for " comfort will finish course of nystatin suspension due to the improvement of the thrush, if not clearing up would need to try something different, will need to monitor closely. Educated patient about foods to avoid, keeping dentures out and rinsing mouth often.    (S82.526D) Noninfected skin tear of left lower extremity, sequela  Comment: large area of skin tear  Plan: will use adaptic, non adherent dressing and wrap with kerlix, monitor closely for infection      Electronically signed by:  Sussy Wray NP

## 2019-07-31 NOTE — LETTER
7/31/2019        RE: Marie Kline  [address]  [facility] [address]        [facility]  [facility] Record Number:  [id number]  Place of Service where encounter took place:  [facility]  ([facility]) [[id number]]  Chief Complaint   Patient presents with     Nursing Home Acute       HPI:    Marie Kline  is a 87 year old (4/28/1932), who is being seen today for an episodic care visit.  HPI information obtained from: facility chart records, facility staff, patient report and [facility] chart review. Today's concern is:  Mouth ulcers  Patient reports she is being treated for thrush but her mouth has so much pain and she is requesting more pain medications and more medications for her thrush. On assessment, she has ulcerations on her tongue and her left cheek and the roof of her mouth. Discussed that she should leave her top dentures out until her mouth is improving, discussed avoiding acid foods and beverages.    Noninfected skin tear of left lower extremity, sequela  She requested her leg be looked at today. She reports she is worried it is infected. Her skin tear is large and is covered with steristrips, serosanguineous drainage, no redness or warmth, no s/s of infection. Due to her CLL she is at a high risk for complications.       Past Medical and Surgical History reviewed in Epic today.    MEDICATIONS:  Current Outpatient Medications   Medication Sig Dispense Refill     acetaminophen (TYLENOL) 500 MG tablet Take 2 tablets (1,000 mg) by mouth every 8 hours For one week scheduled then change to prn       albuterol (PROAIR HFA/PROVENTIL HFA/VENTOLIN HFA) 108 (90 BASE) MCG/ACT Inhaler Inhale 2 puffs into the lungs every 6 hours as needed for wheezing 1 Inhaler 8     albuterol (PROVENTIL) (2.5 MG/3ML) 0.083% neb solution Take 2.5 mg by nebulization every 6 hours as needed for shortness of breath / dyspnea or wheezing       aspirin 81 MG EC tablet Take 81 mg by  mouth daily       atorvastatin (LIPITOR) 20 MG tablet Take 1 tablet (20 mg) by mouth daily 90 tablet 4     bisacodyl (DULCOLAX) 5 MG EC tablet Take 1 tablet (5 mg) by mouth daily as needed for constipation       budesonide-formoterol (SYMBICORT) 80-4.5 MCG/ACT Inhaler Inhale 2 puffs into the lungs 2 times daily       calcium carbonate-vitamin D (OS-CARLOS) 500-400 MG-UNIT tablet Take 1 tablet by mouth daily       diclofenac (VOLTAREN) 1 % topical gel Apply 2 g topically 4 times daily as needed for moderate pain       ferrous sulfate (IRON) 325 (65 FE) MG tablet Take 325 mg by mouth daily (with breakfast)        furosemide (LASIX) 20 MG tablet Take 1 tablet (20 mg) by mouth daily 30 tablet 0     gabapentin (NEURONTIN) 300 MG capsule Take 300 mg by mouth 2 times daily       hydrOXYzine (ATARAX) 10 MG tablet Take 10 mg by mouth daily as needed for itching Give 10 mg by mouth as needed for anxiety/pain QD PRN       Immune Globulin, Human, (OCTAGAM) 5 GM/100ML SOLN 300 mg/kg into the vein every 30 days    Hold this medication while in TCU-resume at discharge from TCU       ipratropium - albuterol 0.5 mg/2.5 mg/3 mL (DUONEB) 0.5-2.5 (3) MG/3ML neb solution Take 1 vial (3 mLs) by nebulization 4 times daily X one week then can change to prn       levothyroxine (SYNTHROID/LEVOTHROID) 75 MCG tablet Take 1 tablet (75 mcg) by mouth daily 90 tablet 4     Lidocaine (LIDOCARE) 4 % Patch Place 2 patches onto the skin every 24 hours To prevent lidocaine toxicity, patient should be patch free for 12 hrs daily.       metoprolol succinate ER (TOPROL-XL) 25 MG 24 hr tablet Take 1 tablet (25 mg) by mouth daily 90 tablet 4     Multiple Vitamins-Minerals (MULTIVITAMIN GUMMIES ADULT PO) Take 2 tablets by mouth daily        nystatin (MYCOSTATIN) 691201 UNIT/ML suspension Take 5 mLs by mouth 4 times daily X 7 days, swish and swallow, for thrusgh       oxyCODONE (ROXICODONE) 5 MG tablet Take 2.5 mg by mouth every 4 hours as needed for severe pain  "      oxyCODONE (ROXICODONE) 5 MG tablet Take 0.5 tablets (2.5 mg) by mouth every 4 hours X one week, then decrease to q 6 hours x one week, then to q 8 hours x one week, then to q 12 hours x one week then discontinue this order and continue only with prn oxycodone and have TCU MD/NH wean the prn oxy 60 tablet 0     [START ON 8/24/2019] oxyCODONE (ROXICODONE) 5 MG tablet Take 0.5-1 tablets (2.5-5 mg) by mouth every 4 hours as needed for moderate to severe pain 15 tablet 0     polyethylene glycol (MIRALAX/GLYCOLAX) packet Take 17 g by mouth 2 times daily       senna-docusate (SENOKOT-S/PERICOLACE) 8.6-50 MG tablet Take 2 tablets by mouth 2 times daily       sertraline (ZOLOFT) 50 MG tablet Take 1 tablet (50 mg) by mouth daily 90 tablet 1     traZODone (DESYREL) 50 MG tablet Take 2 tablets (100 mg) by mouth At Bedtime 90 tablet 0       REVIEW OF SYSTEMS:  4 point ROS including Respiratory, CV, GI and , other than that noted in the HPI,  is negative    Objective:  /64   Pulse 72   Temp 98  F (36.7  C)   Resp 16   Ht 1.499 m (4' 11\")   Wt 49.4 kg (108 lb 12.8 oz)   SpO2 94%   BMI 21.97 kg/m     Exam:  GENERAL APPEARANCE:  Alert, cooperative  ENT:  Mouth and posterior oropharynx normal, moist mucous membranes, [place], throat/mouth:mucous membranes moist, multiple ulcerations on the tongue and roof of her mouth, no white patches noted, poor dentition  RESP:  respiratory effort and palpation of chest normal, diminished breath sounds bilateral bases  CV:  Palpation and auscultation of heart done , regular rate and rhythm, no murmur, rub, or gallop, no edema  ABDOMEN:  normal bowel sounds, soft, nontender, no hepatosplenomegaly or other masses  SKIN:  wound healing well, no signs of infection left lower leg  PSYCH:  oriented X 3, affect and mood normal    Labs:   Recent labs in [facility] reviewed by me today.     ASSESSMENT/PLAN:  (K12.1) Mouth ulcers  (primary encounter diagnosis)  Comment: acute changes  Plan: " likely caused by her elevated WBC in the setting of acute illness recovery, will add magic mouth wash for comfort will finish course of nystatin suspension due to the improvement of the thrush, if not clearing up would need to try something different, will need to monitor closely. Educated patient about foods to avoid, keeping dentures out and rinsing mouth often.    (S8.340K) Noninfected skin tear of left lower extremity, sequela  Comment: large area of skin tear  Plan: will use adaptic, non adherent dressing and wrap with kerlix, monitor closely for infection      Electronically signed by:  Sussy Wray NP             Sincerely,        Sussy Wray, SEBAS

## 2019-07-31 NOTE — PROGRESS NOTES
"Clinic Care Coordination Contact    Follow Up Progress Note      Assessment: Patient is currently admitted to [facility].  She is calling to ask if PCP would be willing to prescribe Magic mouth wash for thrush.  I explained to her she is under the care of the providers at TCU and they should be able to order for her.  Rn CC will call [facility] nursing to ensure medication for thrush is ordered.  Marie explained that she is in \"rough shape\" and expects to be in TCU for at least 6 weeks.  Patient has left shoulder fracture and multiple pelvic fractures.  Conservative management and pain control is plan for treatment.    I was able to call [facility] and speak to JEROMY Ignacio.  Mateus explained they are aware of Marie's thrush and have ordered medication for it.    Goals addressed this encounter:   Goals Addressed                 This Visit's Progress      baseline health   Not on track     Goal Statement: I will get back my independence.  Measure of Success: I will not have any pain.  I will be at baseline activity level.  I will not have any ED/IP admissions related to chronic health conditions.    Supportive Steps to Achieve: Patient to take medications as prescribed.  Patient will follow up with providers as recommended.  Patient will follow home care recommendations.  Patient will be supported by RN CC and community paramedic.  Barriers: back pain, not managed well.  Strengths: Patient is able to advocate for self.  Willing to accept recommendations and feedback.  Date to Achieve By: 1 Nov 2019  Patient expressed understanding of goal: yes.    As of today's date 7/31/2019 goal is met at 0 - 25%.   Goal Status:  Ongoing  Patient was close to graduating care coordination, but had fall and is now in hospital for treatment of L shoulder fracture.                    Outreach Frequency: monthly    Plan:   RN CC will continue to follow and will outreach again once patient has discharged from TCU.  Patient is encouraged to call if " any needs arise.    Babs Garcia RN  Care Coordination  Phone:  [phone]  Email: [email or web address]  [facility]

## 2019-08-01 ENCOUNTER — HOSPITAL LABORATORY (OUTPATIENT)
Dept: OTHER | Facility: CLINIC | Age: 84
End: 2019-08-01

## 2019-08-01 ENCOUNTER — NURSING HOME VISIT (OUTPATIENT)
Dept: GERIATRICS | Facility: CLINIC | Age: 84
End: 2019-08-01
Payer: MEDICARE

## 2019-08-01 VITALS
TEMPERATURE: 98.1 F | DIASTOLIC BLOOD PRESSURE: 76 MMHG | RESPIRATION RATE: 18 BRPM | WEIGHT: 108.2 LBS | SYSTOLIC BLOOD PRESSURE: 134 MMHG | BODY MASS INDEX: 21.85 KG/M2 | HEART RATE: 70 BPM | OXYGEN SATURATION: 91 %

## 2019-08-01 DIAGNOSIS — I25.10 CORONARY ARTERY DISEASE INVOLVING NATIVE CORONARY ARTERY OF NATIVE HEART WITHOUT ANGINA PECTORIS: ICD-10-CM

## 2019-08-01 DIAGNOSIS — J18.9 PNEUMONIA OF LEFT LUNG DUE TO INFECTIOUS ORGANISM, UNSPECIFIED PART OF LUNG: ICD-10-CM

## 2019-08-01 DIAGNOSIS — K13.79 MOUTH SORES: Primary | ICD-10-CM

## 2019-08-01 DIAGNOSIS — J44.1 COPD EXACERBATION (H): ICD-10-CM

## 2019-08-01 DIAGNOSIS — S42.92XD CLOSED FRACTURE OF LEFT SHOULDER WITH ROUTINE HEALING, SUBSEQUENT ENCOUNTER: ICD-10-CM

## 2019-08-01 DIAGNOSIS — N94.89 PELVIC HEMATOMA IN FEMALE: ICD-10-CM

## 2019-08-01 DIAGNOSIS — T14.8XXA MULTIPLE SKIN TEARS: ICD-10-CM

## 2019-08-01 DIAGNOSIS — S09.90XD CLOSED HEAD INJURY, SUBSEQUENT ENCOUNTER: ICD-10-CM

## 2019-08-01 DIAGNOSIS — S32.9XXD CLOSED NONDISPLACED FRACTURE OF PELVIS WITH ROUTINE HEALING, UNSPECIFIED PART OF PELVIS, SUBSEQUENT ENCOUNTER: ICD-10-CM

## 2019-08-01 DIAGNOSIS — R53.81 PHYSICAL DECONDITIONING: ICD-10-CM

## 2019-08-01 DIAGNOSIS — F41.9 ANXIETY: ICD-10-CM

## 2019-08-01 DIAGNOSIS — I42.8 NONISCHEMIC CARDIOMYOPATHY (H): ICD-10-CM

## 2019-08-01 DIAGNOSIS — C91.10 CLL (CHRONIC LYMPHOCYTIC LEUKEMIA) (H): ICD-10-CM

## 2019-08-01 DIAGNOSIS — I50.9 CHRONIC CONGESTIVE HEART FAILURE, UNSPECIFIED HEART FAILURE TYPE (H): ICD-10-CM

## 2019-08-01 LAB
ERYTHROCYTE [DISTWIDTH] IN BLOOD BY AUTOMATED COUNT: 19.9 % (ref 10–15)
HCT VFR BLD AUTO: 30.2 % (ref 35–47)
HGB BLD-MCNC: 9.5 G/DL (ref 11.7–15.7)
MCH RBC QN AUTO: 28.3 PG (ref 26.5–33)
MCHC RBC AUTO-ENTMCNC: 31.5 G/DL (ref 31.5–36.5)
MCV RBC AUTO: 90 FL (ref 78–100)
PLATELET # BLD AUTO: 296 10E9/L (ref 150–450)
RBC # BLD AUTO: 3.36 10E12/L (ref 3.8–5.2)
WBC # BLD AUTO: 53.4 10E9/L (ref 4–11)

## 2019-08-01 PROCEDURE — 99309 SBSQ NF CARE MODERATE MDM 30: CPT | Performed by: NURSE PRACTITIONER

## 2019-08-01 NOTE — LETTER
8/1/2019        RE: Marie Kline  [address]  [address]  [facility] [address]        [facility]  [facility] Record Number:  [id number]  Place of Service where encounter took place:  [facility]  (Formerly [facility]) [[id number]]  Chief Complaint   Patient presents with     Nursing Home Acute     mouth sores       HPI:    Marie Kline  is a 87 year old (4/28/1932), who is being seen today for an episodic care visit.  HPI information obtained from: facility chart records, facility staff, patient report and [facility] chart review. Today's concern is:  87 y.o female with PMH CLL, h/o takosubo cardiomyopathy, CHF, CAD- (EF 30-35%), HTN, COPD- on PRN O2 at home, hypothyroidism admitted to hospital as above after fall with resultant left shoulder fracture, pelvic fracture with adjacent hematoma with mass affect on urinary bladder, multiple skin tears and closed head injury and scalp hematoma. Orthopedics consulted and recommended nonoperative management for shoulder fracture. Fever noted inpatient with max 103. Blood cultures and UA negative, CXR revealed RLL PNA Tx with 5 days meronpenem and then completed oral Augmentin. Was admitted to TCU for acute rehab and medical management. Perez HOOK following wounds and saw yesterday with good report.     Today patient is alert, calm, NAD. States pain lessening some and generally well controlled on current regimen. Reports persistent congested cough, but secretions clearing. Reports some mouth discomfort 2/2 oral ulcerations to tongue and roof of mouth. Has been leaving upper denture plate out. Denies every having this prior. Denies SOB, chest pain or dizziness. Reports eating okay. Has been declining supplements. Educated today by writer on importance of optimizing nutritional status for wound healing and recovery. Had been declining stool softeners, educated today on importance of continuing d/t scheduled narcotics. Patient agreeable. VS  reviewed and stable.        Past Medical and Surgical History reviewed in Epic today.    MEDICATIONS:  Current Outpatient Medications   Medication Sig Dispense Refill     acetaminophen (TYLENOL) 500 MG tablet Take 2 tablets (1,000 mg) by mouth every 8 hours For one week scheduled then change to prn       albuterol (PROAIR HFA/PROVENTIL HFA/VENTOLIN HFA) 108 (90 BASE) MCG/ACT Inhaler Inhale 2 puffs into the lungs every 6 hours as needed for wheezing 1 Inhaler 8     albuterol (PROVENTIL) (2.5 MG/3ML) 0.083% neb solution Take 2.5 mg by nebulization every 6 hours as needed for shortness of breath / dyspnea or wheezing       aspirin 81 MG EC tablet Take 81 mg by mouth daily       atorvastatin (LIPITOR) 20 MG tablet Take 1 tablet (20 mg) by mouth daily 90 tablet 4     bisacodyl (DULCOLAX) 5 MG EC tablet Take 1 tablet (5 mg) by mouth daily as needed for constipation       budesonide-formoterol (SYMBICORT) 80-4.5 MCG/ACT Inhaler Inhale 2 puffs into the lungs 2 times daily       calcium carbonate-vitamin D (OS-CARLOS) 500-400 MG-UNIT tablet Take 1 tablet by mouth daily       diclofenac (VOLTAREN) 1 % topical gel Apply 2 g topically 4 times daily as needed for moderate pain       ferrous sulfate (IRON) 325 (65 FE) MG tablet Take 325 mg by mouth daily (with breakfast)        furosemide (LASIX) 20 MG tablet Take 1 tablet (20 mg) by mouth daily 30 tablet 0     gabapentin (NEURONTIN) 300 MG capsule Take 300 mg by mouth 3 times daily        hydrOXYzine (ATARAX) 10 MG tablet Take 10 mg by mouth daily as needed for itching Give 10 mg by mouth as needed for anxiety/pain QD PRN       ipratropium - albuterol 0.5 mg/2.5 mg/3 mL (DUONEB) 0.5-2.5 (3) MG/3ML neb solution Take 1 vial (3 mLs) by nebulization 4 times daily X one week then can change to prn       levothyroxine (SYNTHROID/LEVOTHROID) 75 MCG tablet Take 1 tablet (75 mcg) by mouth daily 90 tablet 4     Lidocaine (LIDOCARE) 4 % Patch Place 2 patches onto the skin every 24 hours To  prevent lidocaine toxicity, patient should be patch free for 12 hrs daily.       magic mouthwash suspension (diphenhydrAMINE, lidocaine, aluminum-magnesium & simethicone) Swish and swallow 5 mLs in mouth 4 times daily       metoprolol succinate ER (TOPROL-XL) 25 MG 24 hr tablet Take 1 tablet (25 mg) by mouth daily 90 tablet 4     Multiple Vitamins-Minerals (MULTIVITAMIN GUMMIES ADULT PO) Take 2 tablets by mouth daily        nystatin (MYCOSTATIN) 907703 UNIT/ML suspension Take 5 mLs by mouth 4 times daily X 7 days, swish and swallow, for thrusgh       oxyCODONE (ROXICODONE) 5 MG tablet Take 0.5 tablets (2.5 mg) by mouth every 4 hours X one week, then decrease to q 6 hours x one week, then to q 8 hours x one week, then to q 12 hours x one week then discontinue this order and continue only with prn oxycodone and have TCU MD/NH wean the prn oxy 60 tablet 0     [START ON 8/24/2019] oxyCODONE (ROXICODONE) 5 MG tablet Take 0.5-1 tablets (2.5-5 mg) by mouth every 4 hours as needed for moderate to severe pain 15 tablet 0     polyethylene glycol (MIRALAX/GLYCOLAX) packet Take 17 g by mouth 2 times daily       senna-docusate (SENOKOT-S/PERICOLACE) 8.6-50 MG tablet Take 2 tablets by mouth 2 times daily       sertraline (ZOLOFT) 50 MG tablet Take 1 tablet (50 mg) by mouth daily 90 tablet 1     traZODone (DESYREL) 50 MG tablet Take 2 tablets (100 mg) by mouth At Bedtime 90 tablet 0     Immune Globulin, Human, (OCTAGAM) 5 GM/100ML SOLN 300 mg/kg into the vein every 30 days    Hold this medication while in TCU-resume at discharge from TCU       oxyCODONE (ROXICODONE) 5 MG tablet Take 2.5 mg by mouth every 4 hours as needed for severe pain           REVIEW OF SYSTEMS:  4 point ROS including Respiratory, CV, GI and , other than that noted in the HPI,  is negative    Objective:  /76   Pulse 70   Temp 98.1  F (36.7  C)   Resp 18   Wt 49.1 kg (108 lb 3.2 oz)   SpO2 91%   BMI 21.85 kg/m     Exam:    Resp: Effort WNL, LSCTA    CV: VSS as above  Abd- soft, nontender, BS +  Musc- MADRIGAL  Psych- alert, calm, pleasant      Labs:   Recent labs in [facility] reviewed by me today.     ASSESSMENT/PLAN:    Mouth ulcers  - multiple oval aphthous ulcers noted to tongue and roof of mouth-  ? Stomatitis  - new on Magic mouthwash and has been leaving upper plate out.   - eating soft foods and encouraged to take nutritional supplements.   -continue regular and good oral care (nsg to assist with this )  - follow clinically  Shoulder fracture  - NWB, immobilize  - assess distal CMS q shift.   - continue on oxycodone taper, scheduled acetaminophen, gabapentin (recently increased)  - f/w ortho 2-4 weeks     Closed nondisplaced fracture of pelvis with routine healing, unspecified part of pelvis, subsequent encounter  - WBAT  - continue on pain regimen as above  Attempting to balance r/b of med regimen- pain relief vs risk of harm d/t side effect profile. Also has prn diclofenac and low dose gabapentin. Increased Gabapentin to 300 mg TID from 300 mg BID. Also has lidocaine patches to low back, buttocks   - PT/OT  - follow clinically     Pelvic hematoma, female  - mass effect pressing on urinary bladder  - follow HGB, observe s/s bleeding  - keep Rees catheter in for 2 weeks then trial void  - Rees cares per nursing per PARKER     Closed head injury, subsequent encounter-   - observe neurocog and scalp hematoma daily until healed        Multiple skin tears  - wound nurse looked at, skin approximated with steri strips where able and wounds cleaned. Healing well. Dr. Garcia of [facility]. Change nonstick and gauze changes to BID- keep clean and dry until healed  - dietician referral to optimize nutritional status for healing  - APM  - follow clinically     Pneumonia of left lung due to infectious organism, unspecified part of lung  COPD exacerbation (H)  - wears O2 just prn at home- requiring it cont currently, afebrile  - completed Augmentin course, and  scheduled Duonebs  - patient only uses nebulizers prn at home  - wean O2 as able to keep sats > 92%  - VS per unit protocol     Coronary artery disease involving native coronary artery of native heart without angina pectoris  - no active s/s-  - continues on metoprolol, statin and low dose asa     Nonischemic cardiomyopathy (H)  Chronic congestive heart failure, unspecified heart failure type (H)  - Appears essentially euvolemic  - EF 30-35% on Echo last  - continue on BB  - follow weights and VS     CLL (chronic lymphocytic leukemia) (H)  - WBC ,000 and with thrombocytopenia  - dx 2007- no chemo since 2017 2/2 overall health  - f/w Dr. Ivory        Anxiety    mood stable.   - prn alprazolam added inpatient, not utilized in TCU so discontinued and added daily prn dose of hydroxyzine       Physical deconditioning   2/2 above  - [place]  - PT/OT  - ongoing discharge planning, SW follow and care conferences per unit protocol        Electronically signed by:  NOHEMI Zee CNP                 Sincerely,        NOHEMI Zee CNP

## 2019-08-01 NOTE — PROGRESS NOTES
[facility]  [facility] Record Number:  [id number]  Place of Service where encounter took place:  [facility]  (S) [[id number]]  Chief Complaint   Patient presents with     Nursing Home Acute     mouth sores       HPI:    Marie Kline  is a 87 year old (4/28/1932), who is being seen today for an episodic care visit.  HPI information obtained from: facility chart records, facility staff, patient report and [facility] chart review. Today's concern is:  87 y.o female with PMH CLL, h/o takosubo cardiomyopathy, CHF, CAD- (EF 30-35%), HTN, COPD- on PRN O2 at home, hypothyroidism admitted to hospital as above after fall with resultant left shoulder fracture, pelvic fracture with adjacent hematoma with mass affect on urinary bladder, multiple skin tears and closed head injury and scalp hematoma. Orthopedics consulted and recommended nonoperative management for shoulder fracture. Fever noted inpatient with max 103. Blood cultures and UA negative, CXR revealed RLL PNA Tx with 5 days meronpenem and then completed oral Augmentin. Was admitted to TCU for acute rehab and medical management. Perez HOOK following wounds and saw yesterday with good report.     Today patient is alert, calm, NAD. States pain lessening some and generally well controlled on current regimen. Reports persistent congested cough, but secretions clearing. Reports some mouth discomfort 2/2 oral ulcerations to tongue and roof of mouth. Has been leaving upper denture plate out. Denies every having this prior. Denies SOB, chest pain or dizziness. Reports eating okay. Has been declining supplements. Educated today by writer on importance of optimizing nutritional status for wound healing and recovery. Had been declining stool softeners, educated today on importance of continuing d/t scheduled narcotics. Patient agreeable. VS reviewed and stable.        Past Medical and Surgical History reviewed in Epic  today.    MEDICATIONS:  Current Outpatient Medications   Medication Sig Dispense Refill     acetaminophen (TYLENOL) 500 MG tablet Take 2 tablets (1,000 mg) by mouth every 8 hours For one week scheduled then change to prn       albuterol (PROAIR HFA/PROVENTIL HFA/VENTOLIN HFA) 108 (90 BASE) MCG/ACT Inhaler Inhale 2 puffs into the lungs every 6 hours as needed for wheezing 1 Inhaler 8     albuterol (PROVENTIL) (2.5 MG/3ML) 0.083% neb solution Take 2.5 mg by nebulization every 6 hours as needed for shortness of breath / dyspnea or wheezing       aspirin 81 MG EC tablet Take 81 mg by mouth daily       atorvastatin (LIPITOR) 20 MG tablet Take 1 tablet (20 mg) by mouth daily 90 tablet 4     bisacodyl (DULCOLAX) 5 MG EC tablet Take 1 tablet (5 mg) by mouth daily as needed for constipation       budesonide-formoterol (SYMBICORT) 80-4.5 MCG/ACT Inhaler Inhale 2 puffs into the lungs 2 times daily       calcium carbonate-vitamin D (OS-CARLOS) 500-400 MG-UNIT tablet Take 1 tablet by mouth daily       diclofenac (VOLTAREN) 1 % topical gel Apply 2 g topically 4 times daily as needed for moderate pain       ferrous sulfate (IRON) 325 (65 FE) MG tablet Take 325 mg by mouth daily (with breakfast)        furosemide (LASIX) 20 MG tablet Take 1 tablet (20 mg) by mouth daily 30 tablet 0     gabapentin (NEURONTIN) 300 MG capsule Take 300 mg by mouth 3 times daily        hydrOXYzine (ATARAX) 10 MG tablet Take 10 mg by mouth daily as needed for itching Give 10 mg by mouth as needed for anxiety/pain QD PRN       ipratropium - albuterol 0.5 mg/2.5 mg/3 mL (DUONEB) 0.5-2.5 (3) MG/3ML neb solution Take 1 vial (3 mLs) by nebulization 4 times daily X one week then can change to prn       levothyroxine (SYNTHROID/LEVOTHROID) 75 MCG tablet Take 1 tablet (75 mcg) by mouth daily 90 tablet 4     Lidocaine (LIDOCARE) 4 % Patch Place 2 patches onto the skin every 24 hours To prevent lidocaine toxicity, patient should be patch free for 12 hrs daily.        magic mouthwash suspension (diphenhydrAMINE, lidocaine, aluminum-magnesium & simethicone) Swish and swallow 5 mLs in mouth 4 times daily       metoprolol succinate ER (TOPROL-XL) 25 MG 24 hr tablet Take 1 tablet (25 mg) by mouth daily 90 tablet 4     Multiple Vitamins-Minerals (MULTIVITAMIN GUMMIES ADULT PO) Take 2 tablets by mouth daily        nystatin (MYCOSTATIN) 285924 UNIT/ML suspension Take 5 mLs by mouth 4 times daily X 7 days, swish and swallow, for thrusgh       oxyCODONE (ROXICODONE) 5 MG tablet Take 0.5 tablets (2.5 mg) by mouth every 4 hours X one week, then decrease to q 6 hours x one week, then to q 8 hours x one week, then to q 12 hours x one week then discontinue this order and continue only with prn oxycodone and have TCU MD/NH wean the prn oxy 60 tablet 0     [START ON 8/24/2019] oxyCODONE (ROXICODONE) 5 MG tablet Take 0.5-1 tablets (2.5-5 mg) by mouth every 4 hours as needed for moderate to severe pain 15 tablet 0     polyethylene glycol (MIRALAX/GLYCOLAX) packet Take 17 g by mouth 2 times daily       senna-docusate (SENOKOT-S/PERICOLACE) 8.6-50 MG tablet Take 2 tablets by mouth 2 times daily       sertraline (ZOLOFT) 50 MG tablet Take 1 tablet (50 mg) by mouth daily 90 tablet 1     traZODone (DESYREL) 50 MG tablet Take 2 tablets (100 mg) by mouth At Bedtime 90 tablet 0     Immune Globulin, Human, (OCTAGAM) 5 GM/100ML SOLN 300 mg/kg into the vein every 30 days    Hold this medication while in TCU-resume at discharge from TCU       oxyCODONE (ROXICODONE) 5 MG tablet Take 2.5 mg by mouth every 4 hours as needed for severe pain           REVIEW OF SYSTEMS:  4 point ROS including Respiratory, CV, GI and , other than that noted in the HPI,  is negative    Objective:  /76   Pulse 70   Temp 98.1  F (36.7  C)   Resp 18   Wt 49.1 kg (108 lb 3.2 oz)   SpO2 91%   BMI 21.85 kg/m    Exam:    Resp: Effort WNL, LSCTA   CV: VSS as above  Abd- soft, nontender, BS +  Musc- MADRIGAL  Psych- alert, calm,  pleasant      Labs:   Recent labs in [facility] reviewed by me today.     ASSESSMENT/PLAN:    Mouth ulcers  - multiple oval aphthous ulcers noted to tongue and roof of mouth-  ? Stomatitis  - new on Magic mouthwash and has been leaving upper plate out.   - eating soft foods and encouraged to take nutritional supplements.   -continue regular and good oral care (nsg to assist with this )  - follow clinically  Shoulder fracture  - NWB, immobilize  - assess distal CMS q shift.   - continue on oxycodone taper, scheduled acetaminophen, gabapentin (recently increased)  - f/w ortho 2-4 weeks     Closed nondisplaced fracture of pelvis with routine healing, unspecified part of pelvis, subsequent encounter  - WBAT  - continue on pain regimen as above  Attempting to balance r/b of med regimen- pain relief vs risk of harm d/t side effect profile. Also has prn diclofenac and low dose gabapentin. Increased Gabapentin to 300 mg TID from 300 mg BID. Also has lidocaine patches to low back, buttocks   - PT/OT  - follow clinically     Pelvic hematoma, female  - mass effect pressing on urinary bladder  - follow HGB, observe s/s bleeding  - keep Rees catheter in for 2 weeks then trial void  - Rees cares per nursing per PARKER     Closed head injury, subsequent encounter-   - observe neurocog and scalp hematoma daily until healed        Multiple skin tears  - wound nurse looked at, skin approximated with steri strips where able and wounds cleaned. Healing well. Dr. Garcia of [facility]. Change nonstick and gauze changes to BID- keep clean and dry until healed  - dietician referral to optimize nutritional status for healing  - APM  - follow clinically     Pneumonia of left lung due to infectious organism, unspecified part of lung  COPD exacerbation (H)  - wears O2 just prn at home- requiring it cont currently, afebrile  - completed Augmentin course, and scheduled Duonebs  - patient only uses nebulizers prn at home  - wean O2 as able to  keep sats > 92%  - VS per unit protocol     Coronary artery disease involving native coronary artery of native heart without angina pectoris  - no active s/s-  - continues on metoprolol, statin and low dose asa     Nonischemic cardiomyopathy (H)  Chronic congestive heart failure, unspecified heart failure type (H)  - Appears essentially euvolemic  - EF 30-35% on Echo last  - continue on BB  - follow weights and VS     CLL (chronic lymphocytic leukemia) (H)  - WBC ,000 and with thrombocytopenia  - dx 2007- no chemo since 2017 2/2 overall health  - f/w Dr. Ivory        Anxiety   mood stable.   - prn alprazolam added inpatient, not utilized in TCU so discontinued and added daily prn dose of hydroxyzine       Physical deconditioning   2/2 above  - [place]  - PT/OT  - ongoing discharge planning, SW follow and care conferences per unit protocol        Electronically signed by:  NOHEMI Zee CNP

## 2019-08-03 PROBLEM — S09.90XD CLOSED HEAD INJURY, SUBSEQUENT ENCOUNTER: Status: ACTIVE | Noted: 2019-08-03

## 2019-08-03 PROBLEM — J18.9 PNEUMONIA OF LEFT LUNG DUE TO INFECTIOUS ORGANISM, UNSPECIFIED PART OF LUNG: Status: ACTIVE | Noted: 2019-08-03

## 2019-08-03 PROBLEM — T14.8XXA MULTIPLE SKIN TEARS: Status: ACTIVE | Noted: 2019-08-03

## 2019-08-03 PROBLEM — K13.79 MOUTH SORES: Status: ACTIVE | Noted: 2019-08-03

## 2019-08-03 PROBLEM — N94.89 PELVIC HEMATOMA IN FEMALE: Status: ACTIVE | Noted: 2019-08-03

## 2019-08-03 PROBLEM — S32.9XXD CLOSED NONDISPLACED FRACTURE OF PELVIS WITH ROUTINE HEALING, UNSPECIFIED PART OF PELVIS, SUBSEQUENT ENCOUNTER: Status: ACTIVE | Noted: 2019-08-03

## 2019-08-05 ENCOUNTER — HOSPITAL LABORATORY (OUTPATIENT)
Dept: OTHER | Facility: CLINIC | Age: 84
End: 2019-08-05

## 2019-08-05 VITALS
HEIGHT: 59 IN | RESPIRATION RATE: 17 BRPM | TEMPERATURE: 98 F | HEART RATE: 80 BPM | BODY MASS INDEX: 21.92 KG/M2 | DIASTOLIC BLOOD PRESSURE: 75 MMHG | WEIGHT: 108.7 LBS | SYSTOLIC BLOOD PRESSURE: 159 MMHG | OXYGEN SATURATION: 93 %

## 2019-08-05 LAB
ANION GAP SERPL CALCULATED.3IONS-SCNC: 5 MMOL/L (ref 3–14)
BUN SERPL-MCNC: 9 MG/DL (ref 7–30)
CALCIUM SERPL-MCNC: 9.3 MG/DL (ref 8.5–10.1)
CHLORIDE SERPL-SCNC: 95 MMOL/L (ref 94–109)
CO2 SERPL-SCNC: 34 MMOL/L (ref 20–32)
CREAT SERPL-MCNC: 0.6 MG/DL (ref 0.52–1.04)
ERYTHROCYTE [DISTWIDTH] IN BLOOD BY AUTOMATED COUNT: 20.2 % (ref 10–15)
GFR SERPL CREATININE-BSD FRML MDRD: 82 ML/MIN/{1.73_M2}
GLUCOSE SERPL-MCNC: 159 MG/DL (ref 70–99)
HCT VFR BLD AUTO: 36.2 % (ref 35–47)
HGB BLD-MCNC: 10.8 G/DL (ref 11.7–15.7)
MCH RBC QN AUTO: 27.8 PG (ref 26.5–33)
MCHC RBC AUTO-ENTMCNC: 29.8 G/DL (ref 31.5–36.5)
MCV RBC AUTO: 93 FL (ref 78–100)
PLATELET # BLD AUTO: 391 10E9/L (ref 150–450)
POTASSIUM SERPL-SCNC: 3.6 MMOL/L (ref 3.4–5.3)
RBC # BLD AUTO: 3.89 10E12/L (ref 3.8–5.2)
SODIUM SERPL-SCNC: 134 MMOL/L (ref 133–144)
WBC # BLD AUTO: 47.4 10E9/L (ref 4–11)

## 2019-08-05 RX ORDER — POLYETHYLENE GLYCOL 3350 17 G/17G
17 POWDER, FOR SOLUTION ORAL EVERY OTHER DAY
COMMUNITY
End: 2019-09-01

## 2019-08-05 ASSESSMENT — MIFFLIN-ST. JEOR: SCORE: 833.69

## 2019-08-06 ENCOUNTER — NURSING HOME VISIT (OUTPATIENT)
Dept: GERIATRICS | Facility: CLINIC | Age: 84
End: 2019-08-06
Payer: MEDICARE

## 2019-08-06 DIAGNOSIS — C91.10 CLL (CHRONIC LYMPHOCYTIC LEUKEMIA) (H): ICD-10-CM

## 2019-08-06 DIAGNOSIS — F41.9 ANXIETY: ICD-10-CM

## 2019-08-06 DIAGNOSIS — J18.9 PNEUMONIA OF LEFT LUNG DUE TO INFECTIOUS ORGANISM, UNSPECIFIED PART OF LUNG: ICD-10-CM

## 2019-08-06 DIAGNOSIS — I42.8 NONISCHEMIC CARDIOMYOPATHY (H): ICD-10-CM

## 2019-08-06 DIAGNOSIS — S42.92XD CLOSED FRACTURE OF LEFT SHOULDER WITH ROUTINE HEALING, SUBSEQUENT ENCOUNTER: Primary | ICD-10-CM

## 2019-08-06 DIAGNOSIS — R53.81 PHYSICAL DECONDITIONING: ICD-10-CM

## 2019-08-06 DIAGNOSIS — S32.9XXD CLOSED NONDISPLACED FRACTURE OF PELVIS WITH ROUTINE HEALING, UNSPECIFIED PART OF PELVIS, SUBSEQUENT ENCOUNTER: ICD-10-CM

## 2019-08-06 PROCEDURE — 99309 SBSQ NF CARE MODERATE MDM 30: CPT | Performed by: INTERNAL MEDICINE

## 2019-08-06 NOTE — LETTER
8/6/2019        RE: Marie Kline  [address]  [address]  [facility] [address]          PRIMARY CARE PROVIDER AND CLINIC RESPONSIBLE:  Piper Kiser, [address] [facility] / [facility] [address]        ADMISSION HISTORY AND PHYSICAL EXAMINATION     Chief Complaint   Patient presents with     Hospital F/U         HISTORY OF PRESENT ILLNESS:  87 year old female, (4/28/1932), [facility] of medical care and rehab.    Pt admitted [facility] 7/15 to 7/24 for fall and L shoulder fx, pelvic fx. Non-op management. Hospital course complicated by RLL PNA.    Pt has pains in L shoulder. No chest pain/worsening SOB. Her weight is up slightly.     Patient is seen and examined by me with Sadie Youngblood CNP. Please see Sadie Youngblood's admit noted dated 7/25 for details of admission, past medical history, family history, allergies, medication list, social history and other details pertinent with this admission. Hospital admission and dc summary reviewed.      Past Medical History:   Diagnosis Date     Arthritis     Generalized     Bladder cancer (H)      Bladder cancer (H)      Cardiomyopathy (H)      CLL (chronic lymphocytic leukemia) (H)      Congestive heart failure (H) 10/24/2014    flash pulm edema ass w/Takotsubo     COPD (chronic obstructive pulmonary disease) (H)     noc O2     Hypertension      Hypothyroid      Mumps      Myocardial infarction (H) 10/2014    Takotsubo presentation w/mild CAD     Pulmonary edema cardiac cause (H) 10/08/2014    associated w/Takotsubo ACS     Tricuspid valve disorders, 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    TR 2-3+ per echo       Past Surgical History:   Procedure Laterality Date     BIOPSY LYMPH NODE INGUINAL Right 10/23/2018    Procedure: excsional biopsy right inguinal lymph node;  Surgeon: Reji Connors MD;  Location: [facility] OR     BLADDER SURGERY       CORONARY ANGIOGRAPHY ADULT ORDER  10/2014    minimal CAD     CV LEFT HEART CATH  N/A 12/11/2018    Procedure: Left Heart Cath;  Surgeon: Sadiq Carrasco MD;  Location:  HEART CARDIAC CATH LAB     CYSTOSCOPY       CYSTOSCOPY, BIOPSY BLADDER, COMBINED N/A 2/9/2016    Procedure: COMBINED CYSTOSCOPY, BIOPSY BLADDER;  Surgeon: Kar Nuñez MD;  Location: [facility] OR     CYSTOSCOPY, TRANSURETHRAL RESECTION (TUR) TUMOR BLADDER, COMBINED N/A 2/9/2016    Procedure: COMBINED CYSTOSCOPY, TRANSURETHRAL RESECTION (TUR) TUMOR BLADDER;  Surgeon: Kar Nuñez MD;  Location: [facility] OR     ESOPHAGOSCOPY, GASTROSCOPY, DUODENOSCOPY (EGD), COMBINED N/A 6/22/2016    Procedure: COMBINED ESOPHAGOSCOPY, GASTROSCOPY, DUODENOSCOPY (EGD);  Surgeon: Freddie Diez MD;  Location:  GI     OPEN REDUCTION INTERNAL FIXATION HIP BIPOLAR Left 11/19/2018    Procedure: Left bipolar hemiarthroplasty;  Surgeon: Scar Reynolds MD;  Location: [facility] OR       Current Outpatient Medications   Medication Sig     acetaminophen (TYLENOL) 500 MG tablet Take 2 tablets (1,000 mg) by mouth every 8 hours For one week scheduled then change to prn     albuterol (PROAIR HFA/PROVENTIL HFA/VENTOLIN HFA) 108 (90 BASE) MCG/ACT Inhaler Inhale 2 puffs into the lungs every 6 hours as needed for wheezing     albuterol (PROVENTIL) (2.5 MG/3ML) 0.083% neb solution Take 2.5 mg by nebulization every 6 hours as needed for shortness of breath / dyspnea or wheezing     aspirin 81 MG EC tablet Take 81 mg by mouth daily     atorvastatin (LIPITOR) 20 MG tablet Take 1 tablet (20 mg) by mouth daily     bisacodyl (DULCOLAX) 5 MG EC tablet Take 1 tablet (5 mg) by mouth daily as needed for constipation     budesonide-formoterol (SYMBICORT) 80-4.5 MCG/ACT Inhaler Inhale 2 puffs into the lungs 2 times daily     calcium carbonate-vitamin D (OS-CARLOS) 500-400 MG-UNIT tablet Take 1 tablet by mouth daily     diclofenac (VOLTAREN) 1 % topical gel Apply 2 g topically 4 times daily as needed for moderate pain     ferrous sulfate (IRON) 325 (65 FE) MG tablet  Take 325 mg by mouth daily (with breakfast)      furosemide (LASIX) 20 MG tablet Take 1 tablet (20 mg) by mouth daily     gabapentin (NEURONTIN) 300 MG capsule Take 300 mg by mouth 3 times daily      hydrOXYzine (ATARAX) 10 MG tablet Take 10 mg by mouth daily as needed for itching Give 10 mg by mouth as needed for anxiety/pain QD PRN     Immune Globulin, Human, (OCTAGAM) 5 GM/100ML SOLN 300 mg/kg into the vein every 30 days    Hold this medication while in TCU-resume at discharge from TCU     ipratropium - albuterol 0.5 mg/2.5 mg/3 mL (DUONEB) 0.5-2.5 (3) MG/3ML neb solution Take 1 vial (3 mLs) by nebulization 4 times daily X one week then can change to prn     levothyroxine (SYNTHROID/LEVOTHROID) 75 MCG tablet Take 1 tablet (75 mcg) by mouth daily     Lidocaine (LIDOCARE) 4 % Patch Place 2 patches onto the skin every 24 hours To prevent lidocaine toxicity, patient should be patch free for 12 hrs daily.     magic mouthwash suspension (diphenhydrAMINE, lidocaine, aluminum-magnesium & simethicone) Swish and swallow 5 mLs in mouth 4 times daily     metoprolol succinate ER (TOPROL-XL) 25 MG 24 hr tablet Take 1 tablet (25 mg) by mouth daily     Multiple Vitamins-Minerals (MULTIVITAMIN GUMMIES ADULT PO) Take 2 tablets by mouth daily      oxyCODONE (ROXICODONE) 5 MG tablet Take 0.5 tablets (2.5 mg) by mouth every 4 hours X one week, then decrease to q 6 hours x one week, then to q 8 hours x one week, then to q 12 hours x one week then discontinue this order and continue only with prn oxycodone and have TCU MD/NH wean the prn oxy     [START ON 8/24/2019] oxyCODONE (ROXICODONE) 5 MG tablet Take 0.5-1 tablets (2.5-5 mg) by mouth every 4 hours as needed for moderate to severe pain     polyethylene glycol (MIRALAX/GLYCOLAX) packet Take 17 g by mouth every other day     polyethylene glycol (MIRALAX/GLYCOLAX) packet Take 17 g by mouth 2 times daily (Patient taking differently: Take 17 g by mouth every other day )      senna-docusate (SENOKOT-S/PERICOLACE) 8.6-50 MG tablet Take 2 tablets by mouth 2 times daily     sertraline (ZOLOFT) 50 MG tablet Take 1 tablet (50 mg) by mouth daily     traZODone (DESYREL) 50 MG tablet Take 2 tablets (100 mg) by mouth At Bedtime     No current facility-administered medications for this visit.        Allergies   Allergen Reactions     Diagnostic X-Ray Materials Hives     CT DYE     Contrast Dye Hives     CT DYE     Prolia [Denosumab]      Osteonecrosis jaw       Wound Dressing Adhesive        Social History     Socioeconomic History     Marital status:      Spouse name: Not on file     Number of children: Not on file     Years of education: Not on file     Highest education level: Not on file   Occupational History     Not on file   Social Needs     Financial resource strain: Not on file     Food insecurity:     Worry: Not on file     Inability: Not on file     Transportation needs:     Medical: Not on file     Non-medical: Not on file   Tobacco Use     Smoking status: Former Smoker     Types: Cigarettes     Last attempt to quit: 2/3/1996     Years since quittin.5     Smokeless tobacco: Never Used   Substance and Sexual Activity     Alcohol use: No     Alcohol/week: 0.0 oz     Drug use: No     Sexual activity: Not Currently     Partners: Male   Lifestyle     Physical activity:     Days per week: Not on file     Minutes per session: Not on file     Stress: Not on file   Relationships     Social connections:     Talks on phone: Not on file     Gets together: Not on file     Attends Sabianist service: Not on file     Active member of club or organization: Not on file     Attends meetings of clubs or organizations: Not on file     Relationship status: Not on file     Intimate partner violence:     Fear of current or ex partner: Not on file     Emotionally abused: Not on file     Physically abused: Not on file     Forced sexual activity: Not on file   Other Topics Concern       "Service Not Asked     Blood Transfusions Not Asked     Caffeine Concern No     Comment: 1/2-2 cups daily     Occupational Exposure Not Asked     Hobby Hazards Not Asked     Sleep Concern Not Asked     Stress Concern Not Asked     Weight Concern Not Asked     Special Diet Yes     Comment: no added salt, no dairy, more gatorade     Back Care Not Asked     Exercise No     Comment: walking her dog 5 times per day - none recently     Bike Helmet Not Asked     Seat Belt Not Asked     Self-Exams Not Asked     Parent/sibling w/ CABG, MI or angioplasty before 65F 55M? No   Social History Narrative     Not on file          Information reviewed:  Medications, vital signs, orders, nursing notes, problem list, hospital information.     ROS: All 10 point review of system completed, those pertinent positive, please see H&P, the remaining ROS is negative.    BP (!) 159/75   Pulse 80   Temp 98  F (36.7  C)   Resp 17   Ht 1.499 m (4' 11\")   Wt 49.3 kg (108 lb 11.2 oz)   SpO2 93%   BMI 21.95 kg/m       PHYSICAL EXAMINATION:   GENERAL:  No acute distress. Sitting on a chair. On oxygen via NC.  SKIN:  Dry and warm.  There is no rash, lesions, ulcers or juandice at area of skin examined.  HEENT:  Head without trauma.  Pupils round, reactive. Exam of conjunctiva and lids are normal. Sclera without icterus. There is no oral thrush.  NECK:  Supple.  There is no cervical adenopathy, no thyromegaly. No jugular venous distension.  CHEST: No reproducible chest tenderness.   LUNGS:  Normal respiratory effort. + crackles in bases B.  HEART:  Regular rate and rhythm.  No murmur, gallops or rubs auscultated.  ABDOMEN:  Soft, bowel sounds positive.  There is no tenderness or guarding.   EXTREMITIES: 1+ edema.LUE in sling which when uncovered shows + ecchymosis and swelling which are improving.  NEUROLOGIC:  Alert and oriented x3.      Lab/Diagnostic data:  Reviewed    Lab Results   Component Value Date    WBC 56.7 08/08/2019     Lab Results "   Component Value Date    RBC 3.30 08/08/2019     Lab Results   Component Value Date    HGB 9.4 08/08/2019     Lab Results   Component Value Date    HCT 30.9 08/08/2019     Lab Results   Component Value Date    MCV 94 08/08/2019     Lab Results   Component Value Date    MCH 28.5 08/08/2019     Lab Results   Component Value Date    MCHC 30.4 08/08/2019     Lab Results   Component Value Date    RDW 20.5 08/08/2019     Lab Results   Component Value Date     08/08/2019       Last Comprehensive Metabolic Panel:  Sodium   Date Value Ref Range Status   08/05/2019 134 133 - 144 mmol/L Final     Potassium   Date Value Ref Range Status   08/05/2019 3.6 3.4 - 5.3 mmol/L Final     Chloride   Date Value Ref Range Status   08/05/2019 95 94 - 109 mmol/L Final     Carbon Dioxide   Date Value Ref Range Status   08/05/2019 34 (H) 20 - 32 mmol/L Final     Anion Gap   Date Value Ref Range Status   08/05/2019 5 3 - 14 mmol/L Final     Glucose   Date Value Ref Range Status   08/05/2019 159 (H) 70 - 99 mg/dL Final     Urea Nitrogen   Date Value Ref Range Status   08/05/2019 9 7 - 30 mg/dL Final     Creatinine   Date Value Ref Range Status   08/05/2019 0.60 0.52 - 1.04 mg/dL Final     GFR Estimate   Date Value Ref Range Status   08/05/2019 82 >60 mL/min/[1.73_m2] Final     Comment:     Non  GFR Calc  Starting 12/18/2018, serum creatinine based estimated GFR (eGFR) will be   calculated using the Chronic Kidney Disease Epidemiology Collaboration   (CKD-EPI) equation.       Calcium   Date Value Ref Range Status   08/05/2019 9.3 8.5 - 10.1 mg/dL Final       ASSESSMENT / PLAN:     Closed fracture of left shoulder with routine healing, subsequent encounter  Closed nondisplaced fracture of pelvis with routine healing, unspecified part of pelvis, subsequent encounter  - x rays showed acute comminuted fx of prox. L humerus and fx across the surgical neck with lateral displacement of distal fracture fragment.  - CT pelvis  showed pubic versus medial sup/inf rami fx and adjacent hematoma with mass effect on bladder, along with sacral ala fx and suspected hairline fx of medial wall of L acetabulum.  - Ortho recommend non-WB LUE and TTWB LLE.  - Pain control.  - bowel regimen.  - attempt to remove tatum.  - f/u with ortho as scheduled.      Pneumonia of R lung due to infectious organism, unspecified part of lung  - CXR showed opacity on R lung.  - s/p Abx.  - Cx NGTD.  - f/u with PCP to ensure resolution.    Nonischemic cardiomyopathy (H)  - TTE 12/2018 showed EF 30-35%.  - On lasix, and metoprolol.  - daily weights.  - wean oxygen.    CLL (chronic lymphocytic leukemia) (H)  - f/u with oncology as scheduled.    Hypothyroidism, unspecified.  - On synthroid.    COPD without exacerbation.  - On symbicort and nebs.    Hyperlipidemia, unspecified.  - On lipitor.    Anxiety  - on zoloft.    Physical deconditioning  -Other problems with same care. Primary care doctor and other specialists to address those chronic problems in next clinic appointment to be scheduled upon discharge from the TCU.      Other problems with same care. Primary care doctor and other specialists to address those chronic problems in next clinic appointment to be scheduled upon discharge from the TCU.    Total time spent with patient visit was 42 min including patient visit, review of past records, 1/2 time on patients counseling and coordinating care.        Rangel Alvarez MD      Sincerely,        Rangel Alvarez MD

## 2019-08-06 NOTE — PROGRESS NOTES
PRIMARY CARE PROVIDER AND CLINIC RESPONSIBLE:  Ppier Kiser, [address] [facility] / [facility], [place] [address]        ADMISSION HISTORY AND PHYSICAL EXAMINATION     Chief Complaint   Patient presents with     Hospital F/U         HISTORY OF PRESENT ILLNESS:  87 year old female, (4/28/1932), [facility] continuation of medical care and rehab.    Pt admitted [facility] 7/15 to 7/24 for fall and L shoulder fx, pelvic fx. Non-op management. Hospital course complicated by RLL PNA.    Pt has pains in L shoulder. No chest pain/worsening SOB. Her weight is up slightly.     Patient is seen and examined by me with Sadie Youngblood CNP. Please see Sadie Youngblood's admit noted dated 7/25 for details of admission, past medical history, family history, allergies, medication list, social history and other details pertinent with this admission. Hospital admission and dc summary reviewed.      Past Medical History:   Diagnosis Date     Arthritis     Generalized     Bladder cancer (H)      Bladder cancer (H)      Cardiomyopathy (H)      CLL (chronic lymphocytic leukemia) (H)      Congestive heart failure (H) 10/24/2014    flash pulm edema ass w/Takotsubo     COPD (chronic obstructive pulmonary disease) (H)     noc O2     Hypertension      Hypothyroid      Mumps      Myocardial infarction (H) 10/2014    Takotsubo presentation w/mild CAD     Pulmonary edema cardiac cause (H) 10/08/2014    associated w/Takotsubo ACS     Tricuspid valve disorders, 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    TR 2-3+ per echo       Past Surgical History:   Procedure Laterality Date     BIOPSY LYMPH NODE INGUINAL Right 10/23/2018    Procedure: excsional biopsy right inguinal lymph node;  Surgeon: Reji Connors MD;  Location: [facility] OR     BLADDER SURGERY       CORONARY ANGIOGRAPHY ADULT ORDER  10/2014    minimal CAD     CV LEFT HEART CATH N/A 12/11/2018    Procedure: Left Heart Cath;  Surgeon: Sadiq Carrasco MD;  Location:  HEART  CARDIAC CATH LAB     CYSTOSCOPY       CYSTOSCOPY, BIOPSY BLADDER, COMBINED N/A 2/9/2016    Procedure: COMBINED CYSTOSCOPY, BIOPSY BLADDER;  Surgeon: Kar Nuñez MD;  Location: [facility] OR     CYSTOSCOPY, TRANSURETHRAL RESECTION (TUR) TUMOR BLADDER, COMBINED N/A 2/9/2016    Procedure: COMBINED CYSTOSCOPY, TRANSURETHRAL RESECTION (TUR) TUMOR BLADDER;  Surgeon: Kar Nuñez MD;  Location: [facility] OR     ESOPHAGOSCOPY, GASTROSCOPY, DUODENOSCOPY (EGD), COMBINED N/A 6/22/2016    Procedure: COMBINED ESOPHAGOSCOPY, GASTROSCOPY, DUODENOSCOPY (EGD);  Surgeon: Freddie Diez MD;  Location: [facility] GI     OPEN REDUCTION INTERNAL FIXATION HIP BIPOLAR Left 11/19/2018    Procedure: Left bipolar hemiarthroplasty;  Surgeon: Scar Reynolds MD;  Location: [facility] OR       Current Outpatient Medications   Medication Sig     acetaminophen (TYLENOL) 500 MG tablet Take 2 tablets (1,000 mg) by mouth every 8 hours For one week scheduled then change to prn     albuterol (PROAIR HFA/PROVENTIL HFA/VENTOLIN HFA) 108 (90 BASE) MCG/ACT Inhaler Inhale 2 puffs into the lungs every 6 hours as needed for wheezing     albuterol (PROVENTIL) (2.5 MG/3ML) 0.083% neb solution Take 2.5 mg by nebulization every 6 hours as needed for shortness of breath / dyspnea or wheezing     aspirin 81 MG EC tablet Take 81 mg by mouth daily     atorvastatin (LIPITOR) 20 MG tablet Take 1 tablet (20 mg) by mouth daily     bisacodyl (DULCOLAX) 5 MG EC tablet Take 1 tablet (5 mg) by mouth daily as needed for constipation     budesonide-formoterol (SYMBICORT) 80-4.5 MCG/ACT Inhaler Inhale 2 puffs into the lungs 2 times daily     calcium carbonate-vitamin D (OS-CARLOS) 500-400 MG-UNIT tablet Take 1 tablet by mouth daily     diclofenac (VOLTAREN) 1 % topical gel Apply 2 g topically 4 times daily as needed for moderate pain     ferrous sulfate (IRON) 325 (65 FE) MG tablet Take 325 mg by mouth daily (with breakfast)      furosemide (LASIX) 20 MG tablet Take 1 tablet (20 mg)  by mouth daily     gabapentin (NEURONTIN) 300 MG capsule Take 300 mg by mouth 3 times daily      hydrOXYzine (ATARAX) 10 MG tablet Take 10 mg by mouth daily as needed for itching Give 10 mg by mouth as needed for anxiety/pain QD PRN     Immune Globulin, Human, (OCTAGAM) 5 GM/100ML SOLN 300 mg/kg into the vein every 30 days    Hold this medication while in TCU-resume at discharge from TCU     ipratropium - albuterol 0.5 mg/2.5 mg/3 mL (DUONEB) 0.5-2.5 (3) MG/3ML neb solution Take 1 vial (3 mLs) by nebulization 4 times daily X one week then can change to prn     levothyroxine (SYNTHROID/LEVOTHROID) 75 MCG tablet Take 1 tablet (75 mcg) by mouth daily     Lidocaine (LIDOCARE) 4 % Patch Place 2 patches onto the skin every 24 hours To prevent lidocaine toxicity, patient should be patch free for 12 hrs daily.     magic mouthwash suspension (diphenhydrAMINE, lidocaine, aluminum-magnesium & simethicone) Swish and swallow 5 mLs in mouth 4 times daily     metoprolol succinate ER (TOPROL-XL) 25 MG 24 hr tablet Take 1 tablet (25 mg) by mouth daily     Multiple Vitamins-Minerals (MULTIVITAMIN GUMMIES ADULT PO) Take 2 tablets by mouth daily      oxyCODONE (ROXICODONE) 5 MG tablet Take 0.5 tablets (2.5 mg) by mouth every 4 hours X one week, then decrease to q 6 hours x one week, then to q 8 hours x one week, then to q 12 hours x one week then discontinue this order and continue only with prn oxycodone and have TCU MD/NH wean the prn oxy     [START ON 8/24/2019] oxyCODONE (ROXICODONE) 5 MG tablet Take 0.5-1 tablets (2.5-5 mg) by mouth every 4 hours as needed for moderate to severe pain     polyethylene glycol (MIRALAX/GLYCOLAX) packet Take 17 g by mouth every other day     polyethylene glycol (MIRALAX/GLYCOLAX) packet Take 17 g by mouth 2 times daily (Patient taking differently: Take 17 g by mouth every other day )     senna-docusate (SENOKOT-S/PERICOLACE) 8.6-50 MG tablet Take 2 tablets by mouth 2 times daily     sertraline  (ZOLOFT) 50 MG tablet Take 1 tablet (50 mg) by mouth daily     traZODone (DESYREL) 50 MG tablet Take 2 tablets (100 mg) by mouth At Bedtime     No current facility-administered medications for this visit.        Allergies   Allergen Reactions     Diagnostic X-Ray Materials Hives     CT DYE     Contrast Dye Hives     CT DYE     Prolia [Denosumab]      Osteonecrosis jaw       Wound Dressing Adhesive        Social History     Socioeconomic History     Marital status:      Spouse name: Not on file     Number of children: Not on file     Years of education: Not on file     Highest education level: Not on file   Occupational History     Not on file   Social Needs     Financial resource strain: Not on file     Food insecurity:     Worry: Not on file     Inability: Not on file     Transportation needs:     Medical: Not on file     Non-medical: Not on file   Tobacco Use     Smoking status: Former Smoker     Types: Cigarettes     Last attempt to quit: 2/3/1996     Years since quittin.5     Smokeless tobacco: Never Used   Substance and Sexual Activity     Alcohol use: No     Alcohol/week: 0.0 oz     Drug use: No     Sexual activity: Not Currently     Partners: Male   Lifestyle     Physical activity:     Days per week: Not on file     Minutes per session: Not on file     Stress: Not on file   Relationships     Social connections:     Talks on phone: Not on file     Gets together: Not on file     Attends Mandaeism service: Not on file     Active member of club or organization: Not on file     Attends meetings of clubs or organizations: Not on file     Relationship status: Not on file     Intimate partner violence:     Fear of current or ex partner: Not on file     Emotionally abused: Not on file     Physically abused: Not on file     Forced sexual activity: Not on file   Other Topics Concern      Service Not Asked     Blood Transfusions Not Asked     Caffeine Concern No     Comment: 1/2-2 cups daily      "Occupational Exposure Not Asked     Hobby Hazards Not Asked     Sleep Concern Not Asked     Stress Concern Not Asked     Weight Concern Not Asked     Special Diet Yes     Comment: no added salt, no dairy, more gatorade     Back Care Not Asked     Exercise No     Comment: walking her dog 5 times per day - none recently     Bike Helmet Not Asked     Seat Belt Not Asked     Self-Exams Not Asked     Parent/sibling w/ CABG, MI or angioplasty before 65F 55M? No   Social History Narrative     Not on file          Information reviewed:  Medications, vital signs, orders, nursing notes, problem list, hospital information.     ROS: All 10 point review of system completed, those pertinent positive, please see H&P, the remaining ROS is negative.    BP (!) 159/75   Pulse 80   Temp 98  F (36.7  C)   Resp 17   Ht 1.499 m (4' 11\")   Wt 49.3 kg (108 lb 11.2 oz)   SpO2 93%   BMI 21.95 kg/m      PHYSICAL EXAMINATION:   GENERAL:  No acute distress. Sitting on a chair. On oxygen via NC.  SKIN:  Dry and warm.  There is no rash, lesions, ulcers or juandice at area of skin examined.  HEENT:  Head without trauma.  Pupils round, reactive. Exam of conjunctiva and lids are normal. Sclera without icterus. There is no oral thrush.  NECK:  Supple.  There is no cervical adenopathy, no thyromegaly. No jugular venous distension.  CHEST: No reproducible chest tenderness.   LUNGS:  Normal respiratory effort. + crackles in bases B.  HEART:  Regular rate and rhythm.  No murmur, gallops or rubs auscultated.  ABDOMEN:  Soft, bowel sounds positive.  There is no tenderness or guarding.   EXTREMITIES: 1+ edema.LUE in sling which when uncovered shows + ecchymosis and swelling which are improving.  NEUROLOGIC:  Alert and oriented x3.      Lab/Diagnostic data:  Reviewed    Lab Results   Component Value Date    WBC 56.7 08/08/2019     Lab Results   Component Value Date    RBC 3.30 08/08/2019     Lab Results   Component Value Date    HGB 9.4 08/08/2019 "     Lab Results   Component Value Date    HCT 30.9 08/08/2019     Lab Results   Component Value Date    MCV 94 08/08/2019     Lab Results   Component Value Date    MCH 28.5 08/08/2019     Lab Results   Component Value Date    MCHC 30.4 08/08/2019     Lab Results   Component Value Date    RDW 20.5 08/08/2019     Lab Results   Component Value Date     08/08/2019       Last Comprehensive Metabolic Panel:  Sodium   Date Value Ref Range Status   08/05/2019 134 133 - 144 mmol/L Final     Potassium   Date Value Ref Range Status   08/05/2019 3.6 3.4 - 5.3 mmol/L Final     Chloride   Date Value Ref Range Status   08/05/2019 95 94 - 109 mmol/L Final     Carbon Dioxide   Date Value Ref Range Status   08/05/2019 34 (H) 20 - 32 mmol/L Final     Anion Gap   Date Value Ref Range Status   08/05/2019 5 3 - 14 mmol/L Final     Glucose   Date Value Ref Range Status   08/05/2019 159 (H) 70 - 99 mg/dL Final     Urea Nitrogen   Date Value Ref Range Status   08/05/2019 9 7 - 30 mg/dL Final     Creatinine   Date Value Ref Range Status   08/05/2019 0.60 0.52 - 1.04 mg/dL Final     GFR Estimate   Date Value Ref Range Status   08/05/2019 82 >60 mL/min/[1.73_m2] Final     Comment:     Non  GFR Calc  Starting 12/18/2018, serum creatinine based estimated GFR (eGFR) will be   calculated using the Chronic Kidney Disease Epidemiology Collaboration   (CKD-EPI) equation.       Calcium   Date Value Ref Range Status   08/05/2019 9.3 8.5 - 10.1 mg/dL Final       ASSESSMENT / PLAN:     Closed fracture of left shoulder with routine healing, subsequent encounter  Closed nondisplaced fracture of pelvis with routine healing, unspecified part of pelvis, subsequent encounter  - x rays showed acute comminuted fx of prox. L humerus and fx across the surgical neck with lateral displacement of distal fracture fragment.  - CT pelvis showed pubic versus medial sup/inf rami fx and adjacent hematoma with mass effect on bladder, along with sacral  ala fx and suspected hairline fx of medial wall of L acetabulum.  - Ortho recommend non-WB LUE and TTWB LLE.  - Pain control.  - bowel regimen.  - attempt to remove tatum.  - f/u with ortho as scheduled.      Pneumonia of R lung due to infectious organism, unspecified part of lung  - CXR showed opacity on R lung.  - s/p Abx.  - Cx NGTD.  - f/u with PCP to ensure resolution.    Nonischemic cardiomyopathy (H)  - TTE 12/2018 showed EF 30-35%.  - On lasix, and metoprolol.  - daily weights.  - wean oxygen.    CLL (chronic lymphocytic leukemia) (H)  - f/u with oncology as scheduled.    Hypothyroidism, unspecified.  - On synthroid.    COPD without exacerbation.  - On symbicort and nebs.    Hyperlipidemia, unspecified.  - On lipitor.    Anxiety  - on zoloft.    Physical deconditioning  -Other problems with same care. Primary care doctor and other specialists to address those chronic problems in next clinic appointment to be scheduled upon discharge from the TCU.      Other problems with same care. Primary care doctor and other specialists to address those chronic problems in next clinic appointment to be scheduled upon discharge from the TCU.    Total time spent with patient visit was 42 min including patient visit, review of past records, 1/2 time on patients counseling and coordinating care.        Rangel Alvarez MD

## 2019-08-07 ENCOUNTER — TRANSFERRED RECORDS (OUTPATIENT)
Dept: HEALTH INFORMATION MANAGEMENT | Facility: CLINIC | Age: 84
End: 2019-08-07

## 2019-08-07 ENCOUNTER — TELEPHONE (OUTPATIENT)
Dept: INTERNAL MEDICINE | Facility: CLINIC | Age: 84
End: 2019-08-07

## 2019-08-08 ENCOUNTER — HOSPITAL LABORATORY (OUTPATIENT)
Dept: OTHER | Facility: CLINIC | Age: 84
End: 2019-08-08

## 2019-08-08 LAB
ERYTHROCYTE [DISTWIDTH] IN BLOOD BY AUTOMATED COUNT: 20.5 % (ref 10–15)
HCT VFR BLD AUTO: 30.9 % (ref 35–47)
HGB BLD-MCNC: 9.4 G/DL (ref 11.7–15.7)
MCH RBC QN AUTO: 28.5 PG (ref 26.5–33)
MCHC RBC AUTO-ENTMCNC: 30.4 G/DL (ref 31.5–36.5)
MCV RBC AUTO: 94 FL (ref 78–100)
PLATELET # BLD AUTO: 247 10E9/L (ref 150–450)
RBC # BLD AUTO: 3.3 10E12/L (ref 3.8–5.2)
WBC # BLD AUTO: 56.7 10E9/L (ref 4–11)

## 2019-08-12 ENCOUNTER — HOSPITAL LABORATORY (OUTPATIENT)
Dept: OTHER | Facility: CLINIC | Age: 84
End: 2019-08-12

## 2019-08-12 LAB
ERYTHROCYTE [DISTWIDTH] IN BLOOD BY AUTOMATED COUNT: 20.5 % (ref 10–15)
HCT VFR BLD AUTO: 30.8 % (ref 35–47)
HGB BLD-MCNC: 9.5 G/DL (ref 11.7–15.7)
MCH RBC QN AUTO: 28.4 PG (ref 26.5–33)
MCHC RBC AUTO-ENTMCNC: 30.8 G/DL (ref 31.5–36.5)
MCV RBC AUTO: 92 FL (ref 78–100)
PLATELET # BLD AUTO: 162 10E9/L (ref 150–450)
RBC # BLD AUTO: 3.34 10E12/L (ref 3.8–5.2)
WBC # BLD AUTO: 47.3 10E9/L (ref 4–11)

## 2019-08-13 ENCOUNTER — NURSING HOME VISIT (OUTPATIENT)
Dept: GERIATRICS | Facility: CLINIC | Age: 84
End: 2019-08-13
Payer: MEDICARE

## 2019-08-13 VITALS
BODY MASS INDEX: 22.34 KG/M2 | SYSTOLIC BLOOD PRESSURE: 115 MMHG | OXYGEN SATURATION: 94 % | TEMPERATURE: 97.8 F | WEIGHT: 110.6 LBS | RESPIRATION RATE: 16 BRPM | HEART RATE: 67 BPM | DIASTOLIC BLOOD PRESSURE: 61 MMHG

## 2019-08-13 DIAGNOSIS — S09.90XD CLOSED HEAD INJURY, SUBSEQUENT ENCOUNTER: ICD-10-CM

## 2019-08-13 DIAGNOSIS — N94.89 PELVIC HEMATOMA IN FEMALE: ICD-10-CM

## 2019-08-13 DIAGNOSIS — R53.81 PHYSICAL DECONDITIONING: ICD-10-CM

## 2019-08-13 DIAGNOSIS — J18.9 PNEUMONIA OF LEFT LUNG DUE TO INFECTIOUS ORGANISM, UNSPECIFIED PART OF LUNG: ICD-10-CM

## 2019-08-13 DIAGNOSIS — K13.79 MOUTH SORES: ICD-10-CM

## 2019-08-13 DIAGNOSIS — T14.8XXA MULTIPLE SKIN TEARS: ICD-10-CM

## 2019-08-13 DIAGNOSIS — F41.9 ANXIETY: ICD-10-CM

## 2019-08-13 DIAGNOSIS — I42.8 NONISCHEMIC CARDIOMYOPATHY (H): ICD-10-CM

## 2019-08-13 DIAGNOSIS — J44.1 COPD EXACERBATION (H): ICD-10-CM

## 2019-08-13 DIAGNOSIS — C91.10 CLL (CHRONIC LYMPHOCYTIC LEUKEMIA) (H): ICD-10-CM

## 2019-08-13 DIAGNOSIS — I50.9 CHRONIC CONGESTIVE HEART FAILURE, UNSPECIFIED HEART FAILURE TYPE (H): ICD-10-CM

## 2019-08-13 DIAGNOSIS — S32.9XXD CLOSED NONDISPLACED FRACTURE OF PELVIS WITH ROUTINE HEALING, UNSPECIFIED PART OF PELVIS, SUBSEQUENT ENCOUNTER: ICD-10-CM

## 2019-08-13 DIAGNOSIS — S42.92XD CLOSED FRACTURE OF LEFT SHOULDER WITH ROUTINE HEALING, SUBSEQUENT ENCOUNTER: ICD-10-CM

## 2019-08-13 DIAGNOSIS — I25.10 CORONARY ARTERY DISEASE INVOLVING NATIVE CORONARY ARTERY OF NATIVE HEART WITHOUT ANGINA PECTORIS: ICD-10-CM

## 2019-08-13 PROCEDURE — 99309 SBSQ NF CARE MODERATE MDM 30: CPT | Performed by: NURSE PRACTITIONER

## 2019-08-13 NOTE — PROGRESS NOTES
[facility]  [place] Medical Record Number:  [id number]  Place of Service where encounter took place:  [facility]  (Critical access hospital) [[id number]]  Chief Complaint   Patient presents with     RECHECK       HPI:    Marie Kline  is a 87 year old (4/28/1932), who is being seen today for an episodic care visit.  HPI information obtained from: facility chart records, facility staff, patient report and [facility] chart review. Today's concern is:    Patient in TCU for acute rehab following hospitalization for fall and resultant left shoulder fracture, pelvic fracture, multiple skin tears and closed head injury and scalp hematoma. Dx and treated with antibiotics for RRL Pneumonia. Continues on O2 but resp status is improving.     Today patient is found in room lying in bed. Reports fatigue stating has been up since early morning for breakfast and working with therapy. Denies pain at this time. Denies SOB, chest pain. States has productive cough but is lessening. Reports noting LE edema. Rees has been removed and patient is voiding. Denies constipation. States had one loose stool last night. Denies associated n/v or abd pain.    Past Medical and Surgical History reviewed in Epic today.    MEDICATIONS:  Current Outpatient Medications   Medication Sig Dispense Refill     acetaminophen (TYLENOL) 500 MG tablet Take 2 tablets (1,000 mg) by mouth every 8 hours For one week scheduled then change to prn       albuterol (PROAIR HFA/PROVENTIL HFA/VENTOLIN HFA) 108 (90 BASE) MCG/ACT Inhaler Inhale 2 puffs into the lungs every 6 hours as needed for wheezing 1 Inhaler 8     albuterol (PROVENTIL) (2.5 MG/3ML) 0.083% neb solution Take 2.5 mg by nebulization every 6 hours as needed for shortness of breath / dyspnea or wheezing       aspirin 81 MG EC tablet Take 81 mg by mouth daily       atorvastatin (LIPITOR) 20 MG tablet Take 1 tablet (20 mg) by mouth daily 90 tablet 4     bisacodyl (DULCOLAX) 5 MG EC tablet Take 1  tablet (5 mg) by mouth daily as needed for constipation       budesonide-formoterol (SYMBICORT) 80-4.5 MCG/ACT Inhaler Inhale 2 puffs into the lungs 2 times daily       calcium carbonate-vitamin D (OS-CARLOS) 500-400 MG-UNIT tablet Take 1 tablet by mouth daily       diclofenac (VOLTAREN) 1 % topical gel Apply 2 g topically 4 times daily as needed for moderate pain       ferrous sulfate (IRON) 325 (65 FE) MG tablet Take 325 mg by mouth daily (with breakfast)        furosemide (LASIX) 20 MG tablet Take 1 tablet (20 mg) by mouth daily 30 tablet 0     gabapentin (NEURONTIN) 300 MG capsule Take 300 mg by mouth 3 times daily        hydrOXYzine (ATARAX) 10 MG tablet Take 10 mg by mouth daily as needed for itching Give 10 mg by mouth as needed for anxiety/pain QD PRN       Immune Globulin, Human, (OCTAGAM) 5 GM/100ML SOLN 300 mg/kg into the vein every 30 days    Hold this medication while in TCU-resume at discharge from TCU       ipratropium - albuterol 0.5 mg/2.5 mg/3 mL (DUONEB) 0.5-2.5 (3) MG/3ML neb solution Take 1 vial (3 mLs) by nebulization 4 times daily X one week then can change to prn       levothyroxine (SYNTHROID/LEVOTHROID) 75 MCG tablet Take 1 tablet (75 mcg) by mouth daily 90 tablet 4     Lidocaine (LIDOCARE) 4 % Patch Place 2 patches onto the skin every 24 hours To prevent lidocaine toxicity, patient should be patch free for 12 hrs daily.       magic mouthwash suspension (diphenhydrAMINE, lidocaine, aluminum-magnesium & simethicone) Swish and swallow 5 mLs in mouth 4 times daily       metoprolol succinate ER (TOPROL-XL) 25 MG 24 hr tablet Take 1 tablet (25 mg) by mouth daily 90 tablet 4     Multiple Vitamins-Minerals (MULTIVITAMIN GUMMIES ADULT PO) Take 2 tablets by mouth daily        oxyCODONE (ROXICODONE) 5 MG tablet Take 0.5 tablets (2.5 mg) by mouth every 4 hours X one week, then decrease to q 6 hours x one week, then to q 8 hours x one week, then to q 12 hours x one week then discontinue this order and  continue only with prn oxycodone and have TCU MD/NH wean the prn oxy 60 tablet 0     [START ON 8/24/2019] oxyCODONE (ROXICODONE) 5 MG tablet Take 0.5-1 tablets (2.5-5 mg) by mouth every 4 hours as needed for moderate to severe pain 15 tablet 0     polyethylene glycol (MIRALAX/GLYCOLAX) packet Take 17 g by mouth every other day       polyethylene glycol (MIRALAX/GLYCOLAX) packet Take 17 g by mouth 2 times daily (Patient taking differently: Take 17 g by mouth every other day )       senna-docusate (SENOKOT-S/PERICOLACE) 8.6-50 MG tablet Take 2 tablets by mouth 2 times daily       sertraline (ZOLOFT) 50 MG tablet Take 1 tablet (50 mg) by mouth daily 90 tablet 1     traZODone (DESYREL) 50 MG tablet Take 2 tablets (100 mg) by mouth At Bedtime 90 tablet 0         REVIEW OF SYSTEMS:  4 point ROS including Respiratory, CV, GI and , other than that noted in the HPI,  is negative    Objective:  /61   Pulse 67   Temp 97.8  F (36.6  C)   Resp 16   Wt 50.2 kg (110 lb 9.6 oz)   SpO2 94%   BMI 22.34 kg/m    Exam:    Resp: Effort WNL, LS decreased- wheeze noted LLL, on O2 1LNC  CV: S1-2, no S3-4, no murmurs noted- 1+ bilateral LE edema  Abd- soft, nontender, BS +  Musc- MADRIGAL  Psych- alert, calm, pleasant      Labs:   Recent labs in [facility] reviewed by me today.     ASSESSMENT/PLAN:  Shoulder fracture  - NWB, immobilize- saw ortho 9/7- OK ROM at elbow but not shoulder and cont NWB. Needs to see again in one month  - assess distal CMS q shift.   - continue on low dose oxycodone taper, scheduled acetaminophen, gabapentin (increased in TCU early on)  - f/w ortho as directed     Closed nondisplaced fracture of pelvis with routine healing, unspecified part of pelvis, subsequent encounter  - WBAT  - continue on pain regimen as above  - PT/OT  - F/w ortho as directed  - follow clinically     Pelvic hematoma, female  - mass effect pressing on urinary bladder- HGB has been stable  - follow HGB, observe s/s bleeding  - Rees out and  patient voiding without issue     Closed head injury, subsequent encounter-   - neurocog stable/baseline and hematoma resolving  - observe neurocog and scalp hematoma daily until healed        Multiple skin tears  - wound nurse looked at, skin approximated with steri strips where able and wounds cleaned. Healing well. Dr. Garcia of [facility]. Continue nonstick and gauze changes BID- keep clean and dry until healed  - dietician referral made to optimize nutritional status for healing- intake has been good  - APM  - follow clinically     Pneumonia of left lung due to infectious organism, unspecified part of lung  COPD exacerbation (H)  - wears O2 just prn at home- requiring it cont currently, but staff continue to wean, afebrile  - completed Augmentin course  - will scheduled Duonebs 2/2 COPD and some wheezing  - patient only uses nebulizers prn at home  - pulmonary toilet/mobilie  - wean O2 as able to keep sats > 92%  - VS per unit protocol     Coronary artery disease involving native coronary artery of native heart without angina pectoris  - no active s/s-  - continues on metoprolol, statin and low dose asa     Nonischemic cardiomyopathy (H)  Chronic congestive heart failure, unspecified heart failure type (H)  - new LE edema- weight up slighlty-   - EF 30-35% on Echo last  - continue on BB  - increase lasix from 20 mg daily to 40 mg x 3 days, then resume 20 mg  - follow weights and VS    Mouth ulcers  - multiple oval aphthous ulcers noted to tongue and roof of mouth-  ? Stomatitis- resolving without issue.  - completed Magic mouthwash and has been leaving upper plate out most of the time  - eating soft foods and encouraged to take nutritional supplements.   -continue regular and good oral care (nsg to assist with this )  - follow clinically       CLL (chronic lymphocytic leukemia) (H)  - WBC ,000 and with thrombocytopenia- baseline- has been stable at baseline in TCU  - dx 2007- no chemo since 2017  2/2 overall health  - f/w Dr. Ivory        Anxiety   mood stable.   - prn alprazolam added inpatient, not utilized in TCU so discontinued and added daily prn dose of hydroxyzine- but will discontinue as not utilizing        Physical deconditioning   2/2 above  - lives [place], [place] supportive  - PT/OT  - ongoing discharge planning, SW follow and care conferences per unit protocol      Orders written by provider at facility        Electronically signed by:  NOHEMI Zee CNP

## 2019-08-13 NOTE — LETTER
8/13/2019        RE: Marie Kline  [id number]  [facility] [id number]        [place] [facility]  [place] Medical Record Number:  [id number]  Place of Service where encounter took place:  [facility]  (Formerly [facility]) [[id number]]  Chief Complaint   Patient presents with     RECHECK       HPI:    Marie Kline  is a 87 year old (4/28/1932), who is being seen today for an episodic care visit.  HPI information obtained from: facility chart records, facility staff, patient report and [facility] chart review. Today's concern is:    Patient in TCU for acute rehab following hospitalization for fall and resultant left shoulder fracture, pelvic fracture, multiple skin tears and closed head injury and scalp hematoma. Dx and treated with antibiotics for RRL Pneumonia. Continues on O2 but resp status is improving.     Today patient is found in room lying in bed. Reports fatigue stating has been up since early morning for breakfast and working with therapy. Denies pain at this time. Denies SOB, chest pain. States has productive cough but is lessening. Reports noting LE edema. Rees has been removed and patient is voiding. Denies constipation. States had one loose stool last night. Denies associated n/v or abd pain.    Past Medical and Surgical History reviewed in Epic today.    MEDICATIONS:  Current Outpatient Medications   Medication Sig Dispense Refill     acetaminophen (TYLENOL) 500 MG tablet Take 2 tablets (1,000 mg) by mouth every 8 hours For one week scheduled then change to prn       albuterol (PROAIR HFA/PROVENTIL HFA/VENTOLIN HFA) 108 (90 BASE) MCG/ACT Inhaler Inhale 2 puffs into the lungs every 6 hours as needed for wheezing 1 Inhaler 8     albuterol (PROVENTIL) (2.5 MG/3ML) 0.083% neb solution Take 2.5 mg by nebulization every 6 hours as needed for shortness of breath / dyspnea or wheezing       aspirin 81 MG EC tablet Take 81 mg by mouth daily       atorvastatin (LIPITOR) 20 MG  tablet Take 1 tablet (20 mg) by mouth daily 90 tablet 4     bisacodyl (DULCOLAX) 5 MG EC tablet Take 1 tablet (5 mg) by mouth daily as needed for constipation       budesonide-formoterol (SYMBICORT) 80-4.5 MCG/ACT Inhaler Inhale 2 puffs into the lungs 2 times daily       calcium carbonate-vitamin D (OS-CARLOS) 500-400 MG-UNIT tablet Take 1 tablet by mouth daily       diclofenac (VOLTAREN) 1 % topical gel Apply 2 g topically 4 times daily as needed for moderate pain       ferrous sulfate (IRON) 325 (65 FE) MG tablet Take 325 mg by mouth daily (with breakfast)        furosemide (LASIX) 20 MG tablet Take 1 tablet (20 mg) by mouth daily 30 tablet 0     gabapentin (NEURONTIN) 300 MG capsule Take 300 mg by mouth 3 times daily        hydrOXYzine (ATARAX) 10 MG tablet Take 10 mg by mouth daily as needed for itching Give 10 mg by mouth as needed for anxiety/pain QD PRN       Immune Globulin, Human, (OCTAGAM) 5 GM/100ML SOLN 300 mg/kg into the vein every 30 days    Hold this medication while in TCU-resume at discharge from TCU       ipratropium - albuterol 0.5 mg/2.5 mg/3 mL (DUONEB) 0.5-2.5 (3) MG/3ML neb solution Take 1 vial (3 mLs) by nebulization 4 times daily X one week then can change to prn       levothyroxine (SYNTHROID/LEVOTHROID) 75 MCG tablet Take 1 tablet (75 mcg) by mouth daily 90 tablet 4     Lidocaine (LIDOCARE) 4 % Patch Place 2 patches onto the skin every 24 hours To prevent lidocaine toxicity, patient should be patch free for 12 hrs daily.       magic mouthwash suspension (diphenhydrAMINE, lidocaine, aluminum-magnesium & simethicone) Swish and swallow 5 mLs in mouth 4 times daily       metoprolol succinate ER (TOPROL-XL) 25 MG 24 hr tablet Take 1 tablet (25 mg) by mouth daily 90 tablet 4     Multiple Vitamins-Minerals (MULTIVITAMIN GUMMIES ADULT PO) Take 2 tablets by mouth daily        oxyCODONE (ROXICODONE) 5 MG tablet Take 0.5 tablets (2.5 mg) by mouth every 4 hours X one week, then decrease to q 6 hours x  one week, then to q 8 hours x one week, then to q 12 hours x one week then discontinue this order and continue only with prn oxycodone and have TCU MD/NH wean the prn oxy 60 tablet 0     [START ON 8/24/2019] oxyCODONE (ROXICODONE) 5 MG tablet Take 0.5-1 tablets (2.5-5 mg) by mouth every 4 hours as needed for moderate to severe pain 15 tablet 0     polyethylene glycol (MIRALAX/GLYCOLAX) packet Take 17 g by mouth every other day       polyethylene glycol (MIRALAX/GLYCOLAX) packet Take 17 g by mouth 2 times daily (Patient taking differently: Take 17 g by mouth every other day )       senna-docusate (SENOKOT-S/PERICOLACE) 8.6-50 MG tablet Take 2 tablets by mouth 2 times daily       sertraline (ZOLOFT) 50 MG tablet Take 1 tablet (50 mg) by mouth daily 90 tablet 1     traZODone (DESYREL) 50 MG tablet Take 2 tablets (100 mg) by mouth At Bedtime 90 tablet 0         REVIEW OF SYSTEMS:  4 point ROS including Respiratory, CV, GI and , other than that noted in the HPI,  is negative    Objective:  /61   Pulse 67   Temp 97.8  F (36.6  C)   Resp 16   Wt 50.2 kg (110 lb 9.6 oz)   SpO2 94%   BMI 22.34 kg/m     Exam:    Resp: Effort WNL, LS decreased- wheeze noted LLL, on O2 1LNC  CV: S1-2, no S3-4, no murmurs noted- 1+ bilateral LE edema  Abd- soft, nontender, BS +  Musc- MADRIGAL  Psych- alert, calm, pleasant      Labs:   Recent labs in [facility] reviewed by me today.     ASSESSMENT/PLAN:  Shoulder fracture  - NWB, immobilize- saw ortho 9/7- OK ROM at elbow but not shoulder and cont NWB. Needs to see again in one month  - assess distal CMS q shift.   - continue on low dose oxycodone taper, scheduled acetaminophen, gabapentin (increased in TCU early on)  - f/w ortho as directed     Closed nondisplaced fracture of pelvis with routine healing, unspecified part of pelvis, subsequent encounter  - WBAT  - continue on pain regimen as above  - PT/OT  - F/w ortho as directed  - follow clinically     Pelvic hematoma, female  - mass  effect pressing on urinary bladder- HGB has been stable  - follow HGB, observe s/s bleeding  - Rees out and patient voiding without issue     Closed head injury, subsequent encounter-   - neurocog stable/baseline and hematoma resolving  - observe neurocog and scalp hematoma daily until healed        Multiple skin tears  - wound nurse looked at, skin approximated with steri strips where able and wounds cleaned. Healing well. Dr. Garcia of [facility].  Continue nonstick and gauze changes BID- keep clean and dry until healed  - dietician referral  made to optimize nutritional status for healing- intake has been good  - APM  - follow clinically     Pneumonia of left lung due to infectious organism, unspecified part of lung  COPD exacerbation (H)  - wears O2 just prn at home- requiring it cont currently,  but staff continue to wean, afebrile  - completed Augmentin course  - will scheduled Duonebs 2/2 COPD and some wheezing  - patient only uses nebulizers prn at home  - pulmonary toilet/mobilie  - wean O2 as able to keep sats > 92%  - VS per unit protocol     Coronary artery disease involving native coronary artery of native heart without angina pectoris  - no active s/s-  - continues on metoprolol, statin and low dose asa     Nonischemic cardiomyopathy (H)  Chronic congestive heart failure, unspecified heart failure type (H)  - new LE edema- weight up slighlty-   - EF 30-35% on Echo last  - continue on BB  - increase lasix from 20 mg daily to 40 mg x 3 days, then resume 20 mg  - follow weights and VS    Mouth ulcers  - multiple oval aphthous ulcers noted to tongue and roof of mouth-  ? Stomatitis- resolving without issue.  - completed Magic mouthwash and has been leaving upper plate out most of the time  - eating soft foods and encouraged to take nutritional supplements.   -continue regular and good oral care (nsg to assist with this )  - follow clinically       CLL (chronic lymphocytic leukemia) (H)  - WBC  ,000 and with thrombocytopenia- baseline- has been stable at baseline in TCU  - dx 2007- no chemo since 2017 2/2 overall health  - f/w Dr. Ivory        Anxiety   mood stable.   - prn alprazolam added inpatient, not utilized in TCU so discontinued and added daily prn dose of hydroxyzine- but will discontinue as not utilizing        Physical deconditioning   2/2 above  [place]  - PT/OT  - ongoing discharge planning, SW follow and care conferences per unit protocol      Orders written by provider at facility        Electronically signed by:  NOHEMI Zee CNP                 Sincerely,        NOHEMI Zee CNP

## 2019-08-15 ENCOUNTER — HOSPITAL LABORATORY (OUTPATIENT)
Dept: OTHER | Facility: CLINIC | Age: 84
End: 2019-08-15

## 2019-08-15 LAB
ERYTHROCYTE [DISTWIDTH] IN BLOOD BY AUTOMATED COUNT: 20.6 % (ref 10–15)
HCT VFR BLD AUTO: 32.8 % (ref 35–47)
HGB BLD-MCNC: 10.2 G/DL (ref 11.7–15.7)
MCH RBC QN AUTO: 28.7 PG (ref 26.5–33)
MCHC RBC AUTO-ENTMCNC: 31.1 G/DL (ref 31.5–36.5)
MCV RBC AUTO: 92 FL (ref 78–100)
PLATELET # BLD AUTO: 144 10E9/L (ref 150–450)
RBC # BLD AUTO: 3.55 10E12/L (ref 3.8–5.2)
WBC # BLD AUTO: 44.9 10E9/L (ref 4–11)

## 2019-08-15 NOTE — TELEPHONE ENCOUNTER
Patient needs order for nebulizer ASAP.  She is fine on tubing and neb solution. She uses nebs three times daily and hers broke yesterday.  ZAID Sin R.N.     Sickle cell anemia

## 2019-08-16 ENCOUNTER — HOSPITAL LABORATORY (OUTPATIENT)
Dept: OTHER | Facility: CLINIC | Age: 84
End: 2019-08-16

## 2019-08-16 LAB
ANION GAP SERPL CALCULATED.3IONS-SCNC: 5 MMOL/L (ref 3–14)
BASOPHILS # BLD AUTO: 0 10E9/L (ref 0–0.2)
BASOPHILS NFR BLD AUTO: 0.1 %
BUN SERPL-MCNC: 11 MG/DL (ref 7–30)
CALCIUM SERPL-MCNC: 8.5 MG/DL (ref 8.5–10.1)
CHLORIDE SERPL-SCNC: 97 MMOL/L (ref 94–109)
CO2 SERPL-SCNC: 32 MMOL/L (ref 20–32)
CREAT SERPL-MCNC: 0.66 MG/DL (ref 0.52–1.04)
DIFFERENTIAL METHOD BLD: ABNORMAL
EOSINOPHIL # BLD AUTO: 0.1 10E9/L (ref 0–0.7)
EOSINOPHIL NFR BLD AUTO: 0.3 %
ERYTHROCYTE [DISTWIDTH] IN BLOOD BY AUTOMATED COUNT: 20.4 % (ref 10–15)
GFR SERPL CREATININE-BSD FRML MDRD: 79 ML/MIN/{1.73_M2}
GLUCOSE SERPL-MCNC: 127 MG/DL (ref 70–99)
HCT VFR BLD AUTO: 31.2 % (ref 35–47)
HGB BLD-MCNC: 9.8 G/DL (ref 11.7–15.7)
IMM GRANULOCYTES # BLD: 0.1 10E9/L (ref 0–0.4)
IMM GRANULOCYTES NFR BLD: 0.1 %
LYMPHOCYTES # BLD AUTO: 28.9 10E9/L (ref 0.8–5.3)
LYMPHOCYTES NFR BLD AUTO: 86.4 %
MCH RBC QN AUTO: 29.3 PG (ref 26.5–33)
MCHC RBC AUTO-ENTMCNC: 31.4 G/DL (ref 31.5–36.5)
MCV RBC AUTO: 93 FL (ref 78–100)
MONOCYTES # BLD AUTO: 0.4 10E9/L (ref 0–1.3)
MONOCYTES NFR BLD AUTO: 1.3 %
NEUTROPHILS # BLD AUTO: 3.9 10E9/L (ref 1.6–8.3)
NEUTROPHILS NFR BLD AUTO: 11.8 %
NRBC # BLD AUTO: 0 10*3/UL
NRBC BLD AUTO-RTO: 0 /100
PLATELET # BLD AUTO: 133 10E9/L (ref 150–450)
POTASSIUM SERPL-SCNC: 3.6 MMOL/L (ref 3.4–5.3)
RBC # BLD AUTO: 3.34 10E12/L (ref 3.8–5.2)
SODIUM SERPL-SCNC: 134 MMOL/L (ref 133–144)
WBC # BLD AUTO: 33.5 10E9/L (ref 4–11)

## 2019-08-19 ENCOUNTER — HOSPITAL LABORATORY (OUTPATIENT)
Dept: OTHER | Facility: CLINIC | Age: 84
End: 2019-08-19

## 2019-08-19 LAB
ERYTHROCYTE [DISTWIDTH] IN BLOOD BY AUTOMATED COUNT: 19.8 % (ref 10–15)
HCT VFR BLD AUTO: 34.2 % (ref 35–47)
HGB BLD-MCNC: 10.6 G/DL (ref 11.7–15.7)
MCH RBC QN AUTO: 28.6 PG (ref 26.5–33)
MCHC RBC AUTO-ENTMCNC: 31 G/DL (ref 31.5–36.5)
MCV RBC AUTO: 92 FL (ref 78–100)
PLATELET # BLD AUTO: 181 10E9/L (ref 150–450)
RBC # BLD AUTO: 3.7 10E12/L (ref 3.8–5.2)
WBC # BLD AUTO: 35.7 10E9/L (ref 4–11)

## 2019-08-27 ENCOUNTER — NURSING HOME VISIT (OUTPATIENT)
Dept: GERIATRICS | Facility: CLINIC | Age: 84
End: 2019-08-27
Payer: MEDICARE

## 2019-08-27 VITALS
SYSTOLIC BLOOD PRESSURE: 154 MMHG | DIASTOLIC BLOOD PRESSURE: 79 MMHG | HEART RATE: 87 BPM | OXYGEN SATURATION: 92 % | BODY MASS INDEX: 20.56 KG/M2 | WEIGHT: 102 LBS | HEIGHT: 59 IN | RESPIRATION RATE: 18 BRPM | TEMPERATURE: 98 F

## 2019-08-27 DIAGNOSIS — R53.81 PHYSICAL DECONDITIONING: ICD-10-CM

## 2019-08-27 DIAGNOSIS — S32.9XXD CLOSED NONDISPLACED FRACTURE OF PELVIS WITH ROUTINE HEALING, UNSPECIFIED PART OF PELVIS, SUBSEQUENT ENCOUNTER: ICD-10-CM

## 2019-08-27 DIAGNOSIS — I25.10 CORONARY ARTERY DISEASE INVOLVING NATIVE CORONARY ARTERY OF NATIVE HEART WITHOUT ANGINA PECTORIS: ICD-10-CM

## 2019-08-27 DIAGNOSIS — J18.9 PNEUMONIA OF LEFT LUNG DUE TO INFECTIOUS ORGANISM, UNSPECIFIED PART OF LUNG: ICD-10-CM

## 2019-08-27 DIAGNOSIS — K13.79 MOUTH SORES: ICD-10-CM

## 2019-08-27 DIAGNOSIS — S42.92XD CLOSED FRACTURE OF LEFT SHOULDER WITH ROUTINE HEALING, SUBSEQUENT ENCOUNTER: ICD-10-CM

## 2019-08-27 DIAGNOSIS — S09.90XD CLOSED HEAD INJURY, SUBSEQUENT ENCOUNTER: ICD-10-CM

## 2019-08-27 DIAGNOSIS — F41.9 ANXIETY: ICD-10-CM

## 2019-08-27 DIAGNOSIS — T14.8XXA MULTIPLE SKIN TEARS: ICD-10-CM

## 2019-08-27 DIAGNOSIS — J44.1 COPD EXACERBATION (H): Primary | ICD-10-CM

## 2019-08-27 DIAGNOSIS — N94.89 PELVIC HEMATOMA IN FEMALE: ICD-10-CM

## 2019-08-27 DIAGNOSIS — C91.10 CLL (CHRONIC LYMPHOCYTIC LEUKEMIA) (H): ICD-10-CM

## 2019-08-27 DIAGNOSIS — I50.9 CHRONIC CONGESTIVE HEART FAILURE, UNSPECIFIED HEART FAILURE TYPE (H): ICD-10-CM

## 2019-08-27 DIAGNOSIS — I42.8 NONISCHEMIC CARDIOMYOPATHY (H): ICD-10-CM

## 2019-08-27 PROCEDURE — 99309 SBSQ NF CARE MODERATE MDM 30: CPT | Performed by: NURSE PRACTITIONER

## 2019-08-27 RX ORDER — IPRATROPIUM BROMIDE AND ALBUTEROL SULFATE 2.5; .5 MG/3ML; MG/3ML
1 SOLUTION RESPIRATORY (INHALATION) 3 TIMES DAILY
COMMUNITY
End: 2019-10-15

## 2019-08-27 RX ORDER — AMOXICILLIN 250 MG
2 CAPSULE ORAL 2 TIMES DAILY PRN
COMMUNITY
End: 2020-02-03

## 2019-08-27 ASSESSMENT — MIFFLIN-ST. JEOR: SCORE: 803.3

## 2019-08-27 NOTE — PROGRESS NOTES
[place] [facility]  [place] Medical Record Number:  [id number]  Place of Service where encounter took place:  [facility]  ([facility]) [[id number]]  Chief Complaint   Patient presents with     RECHECK       HPI:    Marie Kline  is a 87 year old (4/28/1932), who is being seen today for an episodic care visit.  HPI information obtained from: facility chart records, facility staff, patient report and [facility] chart review. Today's concern is:    Patient in TCU for acute rehab following hospitalization for fall and resultant left shoulder fracture, pelvic fracture, multiple skin tears and closed head injury and scalp hematoma. Dx and treated with antibiotics for RRL Pneumonia. Continues on O2 but resp status is improving and has stabilized. Has been making good global gains in rehab.    Today patient is found in room. Alert, calm, NAD. Denies pain at this time. Reports wounds healing and denies pain at sites. Reports participating in therapy and believes it is going well. Denies SOB, BENJAMIN, chest pain or dizziness. Reports occasional productive cough, bringing up thin white secretions. States appetite okay and sleeping well. Denies issues with bowel or bladder. VS reviewed.       Past Medical and Surgical History reviewed in Epic today.    MEDICATIONS:  Current Outpatient Medications   Medication Sig Dispense Refill     acetaminophen (TYLENOL) 500 MG tablet Take 2 tablets (1,000 mg) by mouth every 8 hours For one week scheduled then change to prn       albuterol (PROAIR HFA/PROVENTIL HFA/VENTOLIN HFA) 108 (90 BASE) MCG/ACT Inhaler Inhale 2 puffs into the lungs every 6 hours as needed for wheezing 1 Inhaler 8     albuterol (PROVENTIL) (2.5 MG/3ML) 0.083% neb solution Take 2.5 mg by nebulization every 6 hours as needed for shortness of breath / dyspnea or wheezing       aspirin 81 MG EC tablet Take 81 mg by mouth daily       atorvastatin (LIPITOR) 20 MG tablet Take 1 tablet (20 mg) by mouth daily 90  tablet 4     bisacodyl (DULCOLAX) 5 MG EC tablet Take 1 tablet (5 mg) by mouth daily as needed for constipation       budesonide-formoterol (SYMBICORT) 80-4.5 MCG/ACT Inhaler Inhale 2 puffs into the lungs 2 times daily       calcium carbonate-vitamin D (OS-CARLOS) 500-400 MG-UNIT tablet Take 1 tablet by mouth daily       diclofenac (VOLTAREN) 1 % topical gel Apply 2 g topically 4 times daily as needed for moderate pain       ferrous sulfate (IRON) 325 (65 FE) MG tablet Take 325 mg by mouth daily (with breakfast)        furosemide (LASIX) 20 MG tablet Take 1 tablet (20 mg) by mouth daily 30 tablet 0     gabapentin (NEURONTIN) 300 MG capsule Take 300 mg by mouth 3 times daily        hydrOXYzine (ATARAX) 10 MG tablet Take 10 mg by mouth daily as needed for itching Give 10 mg by mouth as needed for anxiety/pain QD PRN       Immune Globulin, Human, (OCTAGAM) 5 GM/100ML SOLN 300 mg/kg into the vein every 30 days    Hold this medication while in TCU-resume at discharge from TCU       ipratropium - albuterol 0.5 mg/2.5 mg/3 mL (DUONEB) 0.5-2.5 (3) MG/3ML neb solution Take 1 vial (3 mLs) by nebulization 4 times daily X one week then can change to prn       levothyroxine (SYNTHROID/LEVOTHROID) 75 MCG tablet Take 1 tablet (75 mcg) by mouth daily 90 tablet 4     Lidocaine (LIDOCARE) 4 % Patch Place 2 patches onto the skin every 24 hours To prevent lidocaine toxicity, patient should be patch free for 12 hrs daily.       magic mouthwash suspension (diphenhydrAMINE, lidocaine, aluminum-magnesium & simethicone) Swish and swallow 5 mLs in mouth 4 times daily       metoprolol succinate ER (TOPROL-XL) 25 MG 24 hr tablet Take 1 tablet (25 mg) by mouth daily 90 tablet 4     Multiple Vitamins-Minerals (MULTIVITAMIN GUMMIES ADULT PO) Take 2 tablets by mouth daily        oxyCODONE (ROXICODONE) 5 MG tablet Take 0.5-1 tablets (2.5-5 mg) by mouth every 4 hours as needed for moderate to severe pain 15 tablet 0     oxyCODONE (ROXICODONE) 5 MG  "tablet Take 0.5 tablets (2.5 mg) by mouth every 4 hours X one week, then decrease to q 6 hours x one week, then to q 8 hours x one week, then to q 12 hours x one week then discontinue this order and continue only with prn oxycodone and have TCU MD/NH wean the prn oxy 60 tablet 0     polyethylene glycol (MIRALAX/GLYCOLAX) packet Take 17 g by mouth every other day       polyethylene glycol (MIRALAX/GLYCOLAX) packet Take 17 g by mouth 2 times daily (Patient taking differently: Take 17 g by mouth every other day )       senna-docusate (SENOKOT-S/PERICOLACE) 8.6-50 MG tablet Take 2 tablets by mouth 2 times daily       sertraline (ZOLOFT) 50 MG tablet Take 1 tablet (50 mg) by mouth daily 90 tablet 1     traZODone (DESYREL) 50 MG tablet Take 2 tablets (100 mg) by mouth At Bedtime 90 tablet 0         REVIEW OF SYSTEMS:  4 point ROS including Respiratory, CV, GI and , other than that noted in the HPI,  is negative    Objective:  BP (!) 154/79   Pulse 87   Temp 98  F (36.7  C)   Resp 18   Ht 1.499 m (4' 11\")   Wt 46.3 kg (102 lb)   SpO2 92%   BMI 20.60 kg/m    Exam:    Resp: Effort WNL, LS decreased in bases. O2 on 1.5 LNC  CV: VS as above, no edema noted.   Abd- soft, nontender, BS +  Musc- MADRIGAL  Psych- alert, calm, pleasant      Labs:   Recent labs in [facility] reviewed by me today.     ASSESSMENT/PLAN:  Pneumonia of left lung due to infectious organism, unspecified part of lung  COPD exacerbation (H)  - Completed Augmentin course. Repeat CXR clear, WBC WNL, afebrile. LS very decreased and still wears O2. Was just prn at home-   - recently scheduled Duonebs 2/2 COPD and some wheezing- which has improved some  - will add quick burst of Prednisone for ? COPD exac and in effort to attempt to further wean patient from O2.  - patient only uses nebulizers prn at home  - pulmonary toilet/mobilie  - wean O2 as able to keep sats > 90%  - VS per unit protocol      Shoulder fracture  - NWB, immobilize- saw ortho 8/7- OK ROM at " elbow but not shoulder and cont NWB. Needs to see again in one month  - assess distal CMS q shift.   - continue on low dose oxycodone taper, scheduled acetaminophen, gabapentin (increased in TCU early on)  - f/w ortho as directed     Closed nondisplaced fracture of pelvis with routine healing, unspecified part of pelvis, subsequent encounter  - WBAT  - continue on pain regimen as above  - PT/OT  - F/w ortho as directed  - follow clinically     Pelvic hematoma, female  - mass effect pressing on urinary bladder- HGB has been stable  - follow HGB, observe s/s bleeding  - Rees out and patient voiding without issue     Closed head injury, subsequent encounter-   - neurocog stable/baseline and hematoma resolving  - observe neurocog and scalp hematoma daily until healed        Multiple skin tears  - wound nurse looked at, skin approximated with steri strips where able and wounds cleaned. Healing well. Dr. Garcia of [facility] following. Shoulder wound reopened, so gauze applied prn for drainage. Otherwise gauze and kerliz daily to elbow and leg which are healing very well.  - dietician referral made to optimize nutritional status for healing- intake has been good  - APM  - follow clinically        Coronary artery disease involving native coronary artery of native heart without angina pectoris  - no active s/s-  - continues on metoprolol, statin and low dose asa     Nonischemic cardiomyopathy (H)  Chronic congestive heart failure, unspecified heart failure type (H)  - LE edema resolved- weights trending down  - EF 30-35% on Echo last  - continue on BB  - increased lasix from 20 mg daily to 40 mg x 3 days, then resumed 20 mg  - follow weights and VS     Mouth ulcers  - multiple oval aphthous ulcers noted to tongue and roof of mouth-  ? Stomatitis- resolving without issue.  - -continue regular and good oral care (nsg to assist with this )          CLL (chronic lymphocytic leukemia) (H)  - WBC ,000 and with  thrombocytopenia- baseline- has been stable at baseline in TCU  - dx 2007- no chemo since 2017 2/2 overall health  - f/w Dr. Ivory        Anxiety   mood stable.   - prn alprazolam added inpatient, not utilized in TCU so discontinued and added daily prn dose of hydroxyzine- but will discontinue as not utilizing        Physical deconditioning   2/2 above  [place]  - PT/OT  - ongoing discharge planning, SW follow and care conferences per unit protocol         Orders written by provider at facility        Electronically signed by:  NOHEMI Zee CNP

## 2019-08-27 NOTE — LETTER
8/27/2019        RE: Marie Kline  [address]  [facility] [address]        [facility]  [facility] Record Number:  [id number]  Place of Service where encounter took place:  [facility]  ([facility]) [[id number]]  Chief Complaint   Patient presents with     RECHECK       HPI:    Marie Kline  is a 87 year old (4/28/1932), who is being seen today for an episodic care visit.  HPI information obtained from: facility chart records, facility staff, patient report and [facility] chart review. Today's concern is:    Patient in TCU for acute rehab following hospitalization for fall and resultant left shoulder fracture, pelvic fracture, multiple skin tears and closed head injury and scalp hematoma. Dx and treated with antibiotics for RRL Pneumonia. Continues on O2 but resp status is improving and has stabilized. Has been making good global gains in rehab.    Today patient is found in room. Alert, calm, NAD. Denies pain at this time. Reports wounds healing and denies pain at sites. Reports participating in therapy and believes it is going well. Denies SOB, BENJAMIN, chest pain or dizziness. Reports occasional productive cough, bringing up thin white secretions. States appetite okay and sleeping well. Denies issues with bowel or bladder. VS reviewed.       Past Medical and Surgical History reviewed in Epic today.    MEDICATIONS:  Current Outpatient Medications   Medication Sig Dispense Refill     acetaminophen (TYLENOL) 500 MG tablet Take 2 tablets (1,000 mg) by mouth every 8 hours For one week scheduled then change to prn       albuterol (PROAIR HFA/PROVENTIL HFA/VENTOLIN HFA) 108 (90 BASE) MCG/ACT Inhaler Inhale 2 puffs into the lungs every 6 hours as needed for wheezing 1 Inhaler 8     albuterol (PROVENTIL) (2.5 MG/3ML) 0.083% neb solution Take 2.5 mg by nebulization every 6 hours as needed for shortness of breath / dyspnea or wheezing       aspirin 81 MG EC tablet Take 81  mg by mouth daily       atorvastatin (LIPITOR) 20 MG tablet Take 1 tablet (20 mg) by mouth daily 90 tablet 4     bisacodyl (DULCOLAX) 5 MG EC tablet Take 1 tablet (5 mg) by mouth daily as needed for constipation       budesonide-formoterol (SYMBICORT) 80-4.5 MCG/ACT Inhaler Inhale 2 puffs into the lungs 2 times daily       calcium carbonate-vitamin D (OS-CARLOS) 500-400 MG-UNIT tablet Take 1 tablet by mouth daily       diclofenac (VOLTAREN) 1 % topical gel Apply 2 g topically 4 times daily as needed for moderate pain       ferrous sulfate (IRON) 325 (65 FE) MG tablet Take 325 mg by mouth daily (with breakfast)        furosemide (LASIX) 20 MG tablet Take 1 tablet (20 mg) by mouth daily 30 tablet 0     gabapentin (NEURONTIN) 300 MG capsule Take 300 mg by mouth 3 times daily        hydrOXYzine (ATARAX) 10 MG tablet Take 10 mg by mouth daily as needed for itching Give 10 mg by mouth as needed for anxiety/pain QD PRN       Immune Globulin, Human, (OCTAGAM) 5 GM/100ML SOLN 300 mg/kg into the vein every 30 days    Hold this medication while in TCU-resume at discharge from TCU       ipratropium - albuterol 0.5 mg/2.5 mg/3 mL (DUONEB) 0.5-2.5 (3) MG/3ML neb solution Take 1 vial (3 mLs) by nebulization 4 times daily X one week then can change to prn       levothyroxine (SYNTHROID/LEVOTHROID) 75 MCG tablet Take 1 tablet (75 mcg) by mouth daily 90 tablet 4     Lidocaine (LIDOCARE) 4 % Patch Place 2 patches onto the skin every 24 hours To prevent lidocaine toxicity, patient should be patch free for 12 hrs daily.       magic mouthwash suspension (diphenhydrAMINE, lidocaine, aluminum-magnesium & simethicone) Swish and swallow 5 mLs in mouth 4 times daily       metoprolol succinate ER (TOPROL-XL) 25 MG 24 hr tablet Take 1 tablet (25 mg) by mouth daily 90 tablet 4     Multiple Vitamins-Minerals (MULTIVITAMIN GUMMIES ADULT PO) Take 2 tablets by mouth daily        oxyCODONE (ROXICODONE) 5 MG tablet Take 0.5-1 tablets (2.5-5 mg) by mouth  "every 4 hours as needed for moderate to severe pain 15 tablet 0     oxyCODONE (ROXICODONE) 5 MG tablet Take 0.5 tablets (2.5 mg) by mouth every 4 hours X one week, then decrease to q 6 hours x one week, then to q 8 hours x one week, then to q 12 hours x one week then discontinue this order and continue only with prn oxycodone and have TCU MD/NH wean the prn oxy 60 tablet 0     polyethylene glycol (MIRALAX/GLYCOLAX) packet Take 17 g by mouth every other day       polyethylene glycol (MIRALAX/GLYCOLAX) packet Take 17 g by mouth 2 times daily (Patient taking differently: Take 17 g by mouth every other day )       senna-docusate (SENOKOT-S/PERICOLACE) 8.6-50 MG tablet Take 2 tablets by mouth 2 times daily       sertraline (ZOLOFT) 50 MG tablet Take 1 tablet (50 mg) by mouth daily 90 tablet 1     traZODone (DESYREL) 50 MG tablet Take 2 tablets (100 mg) by mouth At Bedtime 90 tablet 0         REVIEW OF SYSTEMS:  4 point ROS including Respiratory, CV, GI and , other than that noted in the HPI,  is negative    Objective:  BP (!) 154/79   Pulse 87   Temp 98  F (36.7  C)   Resp 18   Ht 1.499 m (4' 11\")   Wt 46.3 kg (102 lb)   SpO2 92%   BMI 20.60 kg/m     Exam:    Resp: Effort WNL, LS decreased in bases. O2 on 1.5 LNC  CV: VS as above, no edema noted.   Abd- soft, nontender, BS +  Musc- MADRIGAL  Psych- alert, calm, pleasant      Labs:   Recent labs in [facility] reviewed by me today.     ASSESSMENT/PLAN:  Pneumonia of left lung due to infectious organism, unspecified part of lung  COPD exacerbation (H)  - Completed Augmentin course. Repeat CXR clear, WBC WNL, afebrile. LS very decreased and still wears O2. Was just prn at home-   - recently scheduled Duonebs 2/2 COPD and some wheezing- which has improved some  - will add quick burst of Prednisone for ? COPD exac and in effort to attempt to further wean patient from O2.  - patient only uses nebulizers prn at home  - pulmonary toilet/mobilie  - wean O2 as able to keep sats > " 90%  - VS per unit protocol      Shoulder fracture  - NWB, immobilize- saw ortho 8/7- OK ROM at elbow but not shoulder and cont NWB. Needs to see again in one month  - assess distal CMS q shift.   - continue on low dose oxycodone taper, scheduled acetaminophen, gabapentin (increased in TCU early on)  - f/w ortho as directed     Closed nondisplaced fracture of pelvis with routine healing, unspecified part of pelvis, subsequent encounter  - WBAT  - continue on pain regimen as above  - PT/OT  - F/w ortho as directed  - follow clinically     Pelvic hematoma, female  - mass effect pressing on urinary bladder- HGB has been stable  - follow HGB, observe s/s bleeding  - Rees out and patient voiding without issue     Closed head injury, subsequent encounter-   - neurocog stable/baseline and hematoma resolving  - observe neurocog and scalp hematoma daily until healed        Multiple skin tears  - wound nurse looked at, skin approximated with steri strips where able and wounds cleaned. Healing well. Dr. Garcia of [facility] following. Shoulder wound reopened, so gauze applied prn for drainage. Otherwise gauze and kerliz daily to elbow and leg which are healing very well.  - dietician referral made to optimize nutritional status for healing- intake has been good  - APM  - follow clinically        Coronary artery disease involving native coronary artery of native heart without angina pectoris  - no active s/s-  - continues on metoprolol, statin and low dose asa     Nonischemic cardiomyopathy (H)  Chronic congestive heart failure, unspecified heart failure type (H)  - LE edema resolved- weights trending down  - EF 30-35% on Echo last  - continue on BB  - increased lasix from 20 mg daily to 40 mg x 3 days, then resumed 20 mg  - follow weights and VS     Mouth ulcers  - multiple oval aphthous ulcers noted to tongue and roof of mouth-  ? Stomatitis- resolving without issue.  - -continue regular and good oral care (nsg to assist with  this )          CLL (chronic lymphocytic leukemia) (H)  - WBC ,000 and with thrombocytopenia- baseline- has been stable at baseline in TCU  - dx 2007- no chemo since 2017 2/2 overall health  - f/w Dr. Ivory        Anxiety   mood stable.   - prn alprazolam added inpatient, not utilized in TCU so discontinued and added daily prn dose of hydroxyzine- but will discontinue as not utilizing        Physical deconditioning   2/2 above  [place]  - PT/OT  - ongoing discharge planning, SW follow and care conferences per unit protocol         Orders written by provider at facility        Electronically signed by:  NOHEMI Zee CNP                 Sincerely,        NOHEMI Zee CNP

## 2019-09-02 ENCOUNTER — TELEPHONE (OUTPATIENT)
Dept: GERIATRICS | Facility: CLINIC | Age: 84
End: 2019-09-02

## 2019-09-03 ENCOUNTER — TRANSFERRED RECORDS (OUTPATIENT)
Dept: HEALTH INFORMATION MANAGEMENT | Facility: CLINIC | Age: 84
End: 2019-09-03

## 2019-09-03 ENCOUNTER — NURSING HOME VISIT (OUTPATIENT)
Dept: GERIATRICS | Facility: CLINIC | Age: 84
End: 2019-09-03
Payer: MEDICARE

## 2019-09-03 VITALS
TEMPERATURE: 98.5 F | DIASTOLIC BLOOD PRESSURE: 67 MMHG | SYSTOLIC BLOOD PRESSURE: 128 MMHG | BODY MASS INDEX: 20.44 KG/M2 | OXYGEN SATURATION: 92 % | HEART RATE: 74 BPM | WEIGHT: 101.4 LBS | HEIGHT: 59 IN | RESPIRATION RATE: 18 BRPM

## 2019-09-03 DIAGNOSIS — S42.92XD CLOSED FRACTURE OF LEFT SHOULDER WITH ROUTINE HEALING, SUBSEQUENT ENCOUNTER: ICD-10-CM

## 2019-09-03 DIAGNOSIS — I42.8 NONISCHEMIC CARDIOMYOPATHY (H): ICD-10-CM

## 2019-09-03 DIAGNOSIS — I50.32 CHRONIC DIASTOLIC CONGESTIVE HEART FAILURE (H): ICD-10-CM

## 2019-09-03 DIAGNOSIS — C91.10 CLL (CHRONIC LYMPHOCYTIC LEUKEMIA) (H): ICD-10-CM

## 2019-09-03 DIAGNOSIS — K12.1 MOUTH ULCERS: ICD-10-CM

## 2019-09-03 DIAGNOSIS — J44.1 COPD EXACERBATION (H): ICD-10-CM

## 2019-09-03 DIAGNOSIS — J18.9 PNEUMONIA OF LEFT LUNG DUE TO INFECTIOUS ORGANISM, UNSPECIFIED PART OF LUNG: ICD-10-CM

## 2019-09-03 DIAGNOSIS — R09.02 HYPOXIA: ICD-10-CM

## 2019-09-03 DIAGNOSIS — R53.81 PHYSICAL DECONDITIONING: ICD-10-CM

## 2019-09-03 DIAGNOSIS — F41.9 ANXIETY: ICD-10-CM

## 2019-09-03 DIAGNOSIS — R07.9 CHEST PAIN, UNSPECIFIED TYPE: Primary | ICD-10-CM

## 2019-09-03 DIAGNOSIS — S32.9XXD CLOSED NONDISPLACED FRACTURE OF PELVIS WITH ROUTINE HEALING, UNSPECIFIED PART OF PELVIS, SUBSEQUENT ENCOUNTER: ICD-10-CM

## 2019-09-03 DIAGNOSIS — I25.119 CORONARY ARTERY DISEASE INVOLVING NATIVE HEART WITH ANGINA PECTORIS, UNSPECIFIED VESSEL OR LESION TYPE (H): ICD-10-CM

## 2019-09-03 DIAGNOSIS — S09.90XD CLOSED HEAD INJURY, SUBSEQUENT ENCOUNTER: ICD-10-CM

## 2019-09-03 DIAGNOSIS — T14.8XXA MULTIPLE SKIN TEARS: ICD-10-CM

## 2019-09-03 PROCEDURE — 99309 SBSQ NF CARE MODERATE MDM 30: CPT | Performed by: NURSE PRACTITIONER

## 2019-09-03 ASSESSMENT — MIFFLIN-ST. JEOR: SCORE: 800.58

## 2019-09-03 NOTE — PROGRESS NOTES
[facility]  [place] [facility] Record Number:  [id number]  Place of Service where encounter took place:  [facility]  (S) [[id number]]  Chief Complaint   Patient presents with     Nursing Home Acute       HPI:    Marie Kline  is a 87 year old (4/28/1932), who is being seen today for an episodic care visit.  HPI information obtained from: facility chart records, facility staff, patient report and [facility] chart review. Today's concern is:  Patient in TCU for acute rehab following hospitalization for fall and resultant left shoulder fracture, pelvic fracture, multiple skin tears and closed head injury and scalp hematoma. Dx and treated with antibiotics for RRL Pneumonia.. Weaning O2 after prednisone burst for COPD exacerbation. Has been making good global gains in rehab.F/w ortho today. Found to have healing left nondisplaced olecranon fracture that was not originally noted. New rehab instructions for PT/OT per ortho.     Per nursing episode of chest pain yesterday. Resolved on its own and has not returned. VS remained stable and on call MD was notified.       Past Medical and Surgical History reviewed in [facility] today.    Today patient found in room with daughter. Is currently off O2. Reports is tolerating well. Has own sat monitor and states lowest sat off o2 during appt was 88% and came up easily with some deep breathing. Denies chest pain, dizziness. Denies pain. Has been declining Boost as feels it causes diarrhea. Recently backed off bowel regimen 2/2 less use of narcotic pain meds and some reports of loose stools. No frequent diarrhea noted and patient denies n/v or abd pain. VS reviewed.     MEDICATIONS:  Current Outpatient Medications   Medication Sig Dispense Refill     ACETAMINOPHEN PO Take 1,000 mg by mouth every 8 hours And every 8 hours as needed       albuterol (PROAIR HFA/PROVENTIL HFA/VENTOLIN HFA) 108 (90 BASE) MCG/ACT Inhaler Inhale 2 puffs into the lungs every 6  hours as needed for wheezing 1 Inhaler 8     albuterol (PROVENTIL) (2.5 MG/3ML) 0.083% neb solution Take 2.5 mg by nebulization every 6 hours as needed for shortness of breath / dyspnea or wheezing       aspirin 81 MG EC tablet Take 81 mg by mouth daily       atorvastatin (LIPITOR) 20 MG tablet Take 1 tablet (20 mg) by mouth daily 90 tablet 4     bisacodyl (DULCOLAX) 5 MG EC tablet Take 1 tablet (5 mg) by mouth daily as needed for constipation       budesonide-formoterol (SYMBICORT) 80-4.5 MCG/ACT Inhaler Inhale 2 puffs into the lungs 2 times daily       calcium carbonate-vitamin D (OS-CARLOS) 500-400 MG-UNIT tablet Take 1 tablet by mouth daily       ferrous sulfate (IRON) 325 (65 FE) MG tablet Take 325 mg by mouth daily (with breakfast)        furosemide (LASIX) 20 MG tablet Take 1 tablet (20 mg) by mouth daily 30 tablet 0     gabapentin (NEURONTIN) 300 MG capsule Take 300 mg by mouth every 12 hours        Immune Globulin, Human, (OCTAGAM) 5 GM/100ML SOLN 300 mg/kg into the vein every 30 days    Hold this medication while in TCU-resume at discharge from TCU       ipratropium - albuterol 0.5 mg/2.5 mg/3 mL (DUONEB) 0.5-2.5 (3) MG/3ML neb solution Take 1 vial by nebulization 3 times daily       levothyroxine (SYNTHROID/LEVOTHROID) 75 MCG tablet Take 1 tablet (75 mcg) by mouth daily 90 tablet 4     Lidocaine (LIDOCARE) 4 % Patch Place 2 patches onto the skin every 24 hours To prevent lidocaine toxicity, patient should be patch free for 12 hrs daily.       magic mouthwash suspension (diphenhydrAMINE, lidocaine, aluminum-magnesium & simethicone) Swish and swallow 5 mLs in mouth 4 times daily       metoprolol succinate ER (TOPROL-XL) 25 MG 24 hr tablet Take 1 tablet (25 mg) by mouth daily 90 tablet 4     senna-docusate (SENOKOT-S/PERICOLACE) 8.6-50 MG tablet Take 2 tablets by mouth 2 times daily as needed for constipation       sertraline (ZOLOFT) 50 MG tablet Take 1 tablet (50 mg) by mouth daily 90 tablet 1     sodium  "chloride (OCEAN) 0.65 % nasal spray Spray 1 spray into both nostrils every hour as needed for congestion       traZODone (DESYREL) 50 MG tablet Take 2 tablets (100 mg) by mouth At Bedtime 90 tablet 0     diclofenac (VOLTAREN) 1 % topical gel Apply 2 g topically 4 times daily as needed for moderate pain       hydrOXYzine (ATARAX) 10 MG tablet Take 10 mg by mouth daily as needed for itching        oxyCODONE (ROXICODONE) 5 MG tablet Take 0.5-1 tablets (2.5-5 mg) by mouth every 4 hours as needed for moderate to severe pain 15 tablet 0         REVIEW OF SYSTEMS:  4 point ROS including Respiratory, CV, GI and , other than that noted in the HPI,  is negative    Objective:  /67   Pulse 74   Temp 98.5  F (36.9  C)   Resp 18   Ht 1.499 m (4' 11\")   Wt 46 kg (101 lb 6.4 oz)   SpO2 92%   BMI 20.48 kg/m    Exam:  Resp: Effort WNL, LS decreased in bases bilaterally  CV: VS as above, no edema  Abd- soft, nontender, BS +  Musc- MADRIGAL  Psych- alert, calm, pleasant      Labs:   Recent labs in [facility] reviewed by me today.   :  ASSESSMENT/PLAN:      Chest pain  Coronary artery disease involving native coronary artery of native heart without angina pectoris  - no active s/s- had episode of chest pain yesterday, VS remained stable and resolved on its own. None further.   - continues on metoprolol, statin and low dose asa  - follow clinically  - should f/w cards after discharge     Nonischemic cardiomyopathy (H)  Chronic congestive heart failure, unspecified heart failure type (H)  - LE edema resolved- weights trending down  - discontinue LE compression  - EF 30-35% on Echo last  - continue on BB  - increased lasix from 20 mg daily to 40 mg x 3 days, then resumed 20 mg and weights stable  - follow weights and VS      Pneumonia of left lung due to infectious organism, unspecified part of lung  COPD exacerbation (H)  - Completed Augmentin course. Repeat CXR clear, WBC WNL, afebrile. LS were very decreased and still wearing O2. " Was just prn at home- so added burst of Prednisone which did seem to help. Weaning O2 and currently off. Keep sats > 90%  - continue on scheduled Duonebs  - continue on Sybicort  - patient only uses nebulizers prn at home  - pulmonary toilet/mobilie  - VS per unit protocol        Shoulder fracture  - was NWB, saw ortho 8/7- and today and now WBAT. Left nondisplaced olecranon fracture noted on xray in ortho office today that was not previously noted. New PT instructions given to PT per ortho.    - oxycodone discontinue last week as pain improved and tapering use. Voltaren also discontinued  - contnue on  acetaminophen, gabapentin (increased in TCU early on)  - f/w ortho as directed     Closed nondisplaced fracture of pelvis with routine healing, unspecified part of pelvis, subsequent encounter  - WBAT- making good functional gains in rehab. Pain controlled  - continue on pain regimen as above  - PT/OT  - F/w ortho as directed  - follow clinically     Pelvic hematoma, female  - mass effect pressing on urinary bladder- HGB has been stable  - follow HGB, observe s/s bleeding  - Rees out and patient voiding without issue     Closed head injury, subsequent encounter-   - neurocog stable/baseline and hematoma resolving  - observe neurocog and scalp hematoma daily until healed        Multiple skin tears  - wound nurse looked at, skin approximated with steri strips where able and wounds cleaned. Healing well. Dr. Garcia of [facility] following. Shoulder wound reopened, so gauze applied prn for drainage. Otherwise gauze and kerliz daily to elbow and leg which are healing very well.  - dietician referral made to optimize nutritional status for healing- intake has been good- declining boost, will discharge and see if dietician can see patient to offer alternatives.  - APM  - follow clinically        Mouth ulcers  - multiple oval aphthous ulcers noted to tongue and roof of mouth-  ? Stomatitis- resolved without  issue.  - -continue regular and good oral care (nsg to assist with this )           CLL (chronic lymphocytic leukemia) (H)  - WBC ,000 and with thrombocytopenia- baseline- has been stable at baseline in TCU  - dx 2007- no chemo since 2017 2/2 overall health  - f/w Dr. Ivory after discharge        Anxiety   mood stable.   - prn alprazolam added inpatient, not utilized in TCU so discontinued and added daily prn dose of hydroxyzine- but will discontinue as not utilizing        Physical deconditioning   2/2 above  - [place]  - PT/OT  - ongoing discharge planning, SW follow and care conferences per unit protocol             Orders written by provider at facility        Electronically signed by:  NOHEMI Zee CNP

## 2019-09-03 NOTE — TELEPHONE ENCOUNTER
Called by nurse that Pt had experienced chest pain earlier in evening, lasting approx 20 minutes. Was diaphoretic, no c/o SOB  Vitals stable, 02 sats 90s,  now is sleeping, appears comfortable.  Recent fall with L shoulder fracture, pelvic fracture, pneumonia.    Assessment    Episode of chest pain, diaphoresis earlier in evening, now resolved without specific treatment, now sleeping comfortable  Etiology of chest pain not clear.  As vitals have been stable, Pt is currently sleeping comfortably, will monitor for now.    Staff advised to call back if recurrent chest pain, change in vitals, update primary team in am.

## 2019-09-03 NOTE — LETTER
9/3/2019        RE: Marie Kline  [address]  [facility] [address]        [place] [facility]  [place] Medical Record Number:  [id number]  Place of Service where encounter took place:  [facility]  ([facility]) [[id number]]  Chief Complaint   Patient presents with     Nursing Home Acute       HPI:    Marie Kline  is a 87 year old (4/28/1932), who is being seen today for an episodic care visit.  HPI information obtained from: facility chart records, facility staff, patient report and [facility] chart review. Today's concern is:  Patient in TCU for acute rehab following hospitalization for fall and resultant left shoulder fracture, pelvic fracture, multiple skin tears and closed head injury and scalp hematoma. Dx and treated with antibiotics for RRL Pneumonia.. Weaning O2 after prednisone burst for COPD exacerbation. Has been making good global gains in rehab.F/w ortho today. Found to have healing left nondisplaced olecranon fracture that was not originally noted. New rehab instructions for PT/OT per ortho.     Per nursing episode of chest pain yesterday. Resolved on its own and has not returned. VS remained stable and on call MD was notified.       Past Medical and Surgical History reviewed in [facility] today.    Today patient found in room with daughter. Is currently off O2. Reports is tolerating well. Has own sat monitor and states lowest sat off o2 during appt was 88% and came up easily with some deep breathing. Denies chest pain, dizziness. Denies pain. Has been declining Boost as feels it causes diarrhea. Recently backed off bowel regimen 2/2 less use of narcotic pain meds and some reports of loose stools. No frequent diarrhea noted and patient denies n/v or abd pain. VS reviewed.     MEDICATIONS:  Current Outpatient Medications   Medication Sig Dispense Refill     ACETAMINOPHEN PO Take 1,000 mg by mouth every 8 hours And every 8 hours as needed       albuterol (PROAIR  HFA/PROVENTIL HFA/VENTOLIN HFA) 108 (90 BASE) MCG/ACT Inhaler Inhale 2 puffs into the lungs every 6 hours as needed for wheezing 1 Inhaler 8     albuterol (PROVENTIL) (2.5 MG/3ML) 0.083% neb solution Take 2.5 mg by nebulization every 6 hours as needed for shortness of breath / dyspnea or wheezing       aspirin 81 MG EC tablet Take 81 mg by mouth daily       atorvastatin (LIPITOR) 20 MG tablet Take 1 tablet (20 mg) by mouth daily 90 tablet 4     bisacodyl (DULCOLAX) 5 MG EC tablet Take 1 tablet (5 mg) by mouth daily as needed for constipation       budesonide-formoterol (SYMBICORT) 80-4.5 MCG/ACT Inhaler Inhale 2 puffs into the lungs 2 times daily       calcium carbonate-vitamin D (OS-CARLOS) 500-400 MG-UNIT tablet Take 1 tablet by mouth daily       ferrous sulfate (IRON) 325 (65 FE) MG tablet Take 325 mg by mouth daily (with breakfast)        furosemide (LASIX) 20 MG tablet Take 1 tablet (20 mg) by mouth daily 30 tablet 0     gabapentin (NEURONTIN) 300 MG capsule Take 300 mg by mouth every 12 hours        Immune Globulin, Human, (OCTAGAM) 5 GM/100ML SOLN 300 mg/kg into the vein every 30 days    Hold this medication while in TCU-resume at discharge from TCU       ipratropium - albuterol 0.5 mg/2.5 mg/3 mL (DUONEB) 0.5-2.5 (3) MG/3ML neb solution Take 1 vial by nebulization 3 times daily       levothyroxine (SYNTHROID/LEVOTHROID) 75 MCG tablet Take 1 tablet (75 mcg) by mouth daily 90 tablet 4     Lidocaine (LIDOCARE) 4 % Patch Place 2 patches onto the skin every 24 hours To prevent lidocaine toxicity, patient should be patch free for 12 hrs daily.       magic mouthwash suspension (diphenhydrAMINE, lidocaine, aluminum-magnesium & simethicone) Swish and swallow 5 mLs in mouth 4 times daily       metoprolol succinate ER (TOPROL-XL) 25 MG 24 hr tablet Take 1 tablet (25 mg) by mouth daily 90 tablet 4     senna-docusate (SENOKOT-S/PERICOLACE) 8.6-50 MG tablet Take 2 tablets by mouth 2 times daily as needed for constipation    "    sertraline (ZOLOFT) 50 MG tablet Take 1 tablet (50 mg) by mouth daily 90 tablet 1     sodium chloride (OCEAN) 0.65 % nasal spray Spray 1 spray into both nostrils every hour as needed for congestion       traZODone (DESYREL) 50 MG tablet Take 2 tablets (100 mg) by mouth At Bedtime 90 tablet 0     diclofenac (VOLTAREN) 1 % topical gel Apply 2 g topically 4 times daily as needed for moderate pain       hydrOXYzine (ATARAX) 10 MG tablet Take 10 mg by mouth daily as needed for itching        oxyCODONE (ROXICODONE) 5 MG tablet Take 0.5-1 tablets (2.5-5 mg) by mouth every 4 hours as needed for moderate to severe pain 15 tablet 0         REVIEW OF SYSTEMS:  4 point ROS including Respiratory, CV, GI and , other than that noted in the HPI,  is negative    Objective:  /67   Pulse 74   Temp 98.5  F (36.9  C)   Resp 18   Ht 1.499 m (4' 11\")   Wt 46 kg (101 lb 6.4 oz)   SpO2 92%   BMI 20.48 kg/m     Exam:  Resp: Effort WNL, LS decreased in bases bilaterally  CV: VS as above, no edema  Abd- soft, nontender, BS +  Musc- MADRIGAL  Psych- alert, calm, pleasant      Labs:   Recent labs in [email or web address] reviewed by me today.   :  ASSESSMENT/PLAN:      Chest pain  Coronary artery disease involving native coronary artery of native heart without angina pectoris  - no active s/s- had episode of chest pain yesterday, VS remained stable and resolved on its own. None further.   - continues on metoprolol, statin and low dose asa  - follow clinically  - should f/w cards after discharge     Nonischemic cardiomyopathy (H)  Chronic congestive heart failure, unspecified heart failure type (H)  - LE edema resolved- weights trending down  - discontinue LE compression  - EF 30-35% on Echo last  - continue on BB  - increased lasix from 20 mg daily to 40 mg x 3 days, then resumed 20 mg and weights stable  - follow weights and VS      Pneumonia of left lung due to infectious organism, unspecified part of lung  COPD exacerbation (H)  - Completed " Augmentin course. Repeat CXR clear, WBC WNL, afebrile. LS were very decreased and still wearing O2. Was just prn at home- so added burst of Prednisone which did seem to help. Weaning O2 and currently off. Keep sats > 90%  -  continue on scheduled Duonebs  - continue on Sybicort  - patient only uses nebulizers prn at home  - pulmonary toilet/mobilie  - VS per unit protocol        Shoulder fracture  - was NWB, saw ortho 8/7-  and today and now WBAT. Left nondisplaced olecranon fracture noted on xray in ortho office today that was not previously noted. New PT instructions given to PT per ortho.    - oxycodone discontinue last week as pain improved and tapering use. Voltaren also discontinued  - contnue on  acetaminophen, gabapentin (increased in TCU early on)  - f/w ortho as directed     Closed nondisplaced fracture of pelvis with routine healing, unspecified part of pelvis, subsequent encounter  - WBAT- making good functional gains in rehab. Pain controlled  - continue on pain regimen as above  - PT/OT  - F/w ortho as directed  - follow clinically     Pelvic hematoma, female  - mass effect pressing on urinary bladder- HGB has been stable  - follow HGB, observe s/s bleeding  - Rees out and patient voiding without issue     Closed head injury, subsequent encounter-   - neurocog stable/baseline and hematoma resolving  - observe neurocog and scalp hematoma daily until healed        Multiple skin tears  - wound nurse looked at, skin approximated with steri strips where able and wounds cleaned. Healing well. Dr[facility] Garcia of [facility] following. Shoulder wound reopened, so gauze applied prn for drainage. Otherwise gauze and kerliz daily to elbow and leg which are healing very well.  - dietician referral made to optimize nutritional status for healing- intake has been good- declining boost, will discharge and see if dietician can see patient to offer alternatives.  - APM  - follow clinically        Mouth ulcers  - multiple oval  aphthous ulcers noted to tongue and roof of mouth-  ? Stomatitis- resolved without issue.  - -continue regular and good oral care (nsg to assist with this )           CLL (chronic lymphocytic leukemia) (H)  - WBC ,000 and with thrombocytopenia- baseline- has been stable at baseline in TCU  - dx 2007- no chemo since 2017 2/2 overall health  - f/w Dr. Ivory after discharge        Anxiety   mood stable.   - prn alprazolam added inpatient, not utilized in TCU so discontinued and added daily prn dose of hydroxyzine- but will discontinue as not utilizing        Physical deconditioning   2/2 above  - [place]  - PT/OT  - ongoing discharge planning, SW follow and care conferences per unit protocol             Orders written by provider at facility        Electronically signed by:  NOHEMI Zee CNP                 Sincerely,        NOHEMI Zee CNP

## 2019-09-05 PROBLEM — R07.9 CHEST PAIN: Status: ACTIVE | Noted: 2019-09-05

## 2019-09-05 PROBLEM — I25.119 CORONARY ARTERY DISEASE INVOLVING NATIVE HEART WITH ANGINA PECTORIS, UNSPECIFIED VESSEL OR LESION TYPE (H): Status: ACTIVE | Noted: 2019-09-05

## 2019-09-10 ENCOUNTER — NURSING HOME VISIT (OUTPATIENT)
Dept: GERIATRICS | Facility: CLINIC | Age: 84
End: 2019-09-10
Payer: MEDICARE

## 2019-09-10 VITALS
DIASTOLIC BLOOD PRESSURE: 65 MMHG | BODY MASS INDEX: 20.8 KG/M2 | TEMPERATURE: 97.9 F | WEIGHT: 103 LBS | OXYGEN SATURATION: 93 % | HEART RATE: 68 BPM | SYSTOLIC BLOOD PRESSURE: 132 MMHG | RESPIRATION RATE: 17 BRPM

## 2019-09-10 DIAGNOSIS — J44.1 COPD EXACERBATION (H): ICD-10-CM

## 2019-09-10 DIAGNOSIS — S42.92XD CLOSED FRACTURE OF LEFT SHOULDER WITH ROUTINE HEALING, SUBSEQUENT ENCOUNTER: ICD-10-CM

## 2019-09-10 DIAGNOSIS — J18.9 PNEUMONIA OF LEFT LUNG DUE TO INFECTIOUS ORGANISM, UNSPECIFIED PART OF LUNG: ICD-10-CM

## 2019-09-10 DIAGNOSIS — K12.1 MOUTH ULCERS: ICD-10-CM

## 2019-09-10 DIAGNOSIS — J44.9 CHRONIC OBSTRUCTIVE PULMONARY DISEASE, UNSPECIFIED COPD TYPE (H): ICD-10-CM

## 2019-09-10 DIAGNOSIS — I50.32 CHRONIC DIASTOLIC CONGESTIVE HEART FAILURE (H): ICD-10-CM

## 2019-09-10 DIAGNOSIS — R53.81 PHYSICAL DECONDITIONING: ICD-10-CM

## 2019-09-10 DIAGNOSIS — C91.10 CLL (CHRONIC LYMPHOCYTIC LEUKEMIA) (H): ICD-10-CM

## 2019-09-10 DIAGNOSIS — T14.8XXA MULTIPLE SKIN TEARS: ICD-10-CM

## 2019-09-10 DIAGNOSIS — S32.9XXD CLOSED NONDISPLACED FRACTURE OF PELVIS WITH ROUTINE HEALING, UNSPECIFIED PART OF PELVIS, SUBSEQUENT ENCOUNTER: ICD-10-CM

## 2019-09-10 DIAGNOSIS — I25.119 CORONARY ARTERY DISEASE INVOLVING NATIVE HEART WITH ANGINA PECTORIS, UNSPECIFIED VESSEL OR LESION TYPE (H): Primary | ICD-10-CM

## 2019-09-10 DIAGNOSIS — F41.9 ANXIETY: ICD-10-CM

## 2019-09-10 DIAGNOSIS — I42.8 NONISCHEMIC CARDIOMYOPATHY (H): ICD-10-CM

## 2019-09-10 DIAGNOSIS — S09.90XD CLOSED HEAD INJURY, SUBSEQUENT ENCOUNTER: ICD-10-CM

## 2019-09-10 PROCEDURE — 99309 SBSQ NF CARE MODERATE MDM 30: CPT | Performed by: NURSE PRACTITIONER

## 2019-09-10 NOTE — LETTER
9/10/2019        RE: Marie Kline  [address]  [facility] [address]        [place] [facility]  [place] Medical Record Number:  [id number]  Place of Service where encounter took place:  [facility]  ([facility]) [[id number]]  Chief Complaint   Patient presents with     RECHECK       HPI:    Marie Kline  is a 87 year old (4/28/1932), who is being seen today for an episodic care visit.  HPI information obtained from: facility chart records, facility staff, patient report and [facility] chart review. Today's concern is:    Patient in TCU following fall and shoulder fracture, pelvis fracture, CHI and multiple skn tears, as well as PNA. Incidental finding of left olecranon fracture by ortho at f/u appt. Skin tears are healed. Weaning from O2, but does still drop < 88% at times so will likely require for home. Has made good gains in rehab.    Past Medical and Surgical History reviewed in [facility] today.    Today is alert, calm, NAD. Denies pain. Denies SOB, cough. Reports O2 sats drop < 88% at times yet. Is wearing O2 prn and overnights. Discussed that will likely need for home and patient verbalized understanding of this.     MEDICATIONS:  Current Outpatient Medications   Medication Sig Dispense Refill     ACETAMINOPHEN PO Take 1,000 mg by mouth every 8 hours And every 8 hours as needed       albuterol (PROAIR HFA/PROVENTIL HFA/VENTOLIN HFA) 108 (90 BASE) MCG/ACT Inhaler Inhale 2 puffs into the lungs every 6 hours as needed for wheezing 1 Inhaler 8     albuterol (PROVENTIL) (2.5 MG/3ML) 0.083% neb solution Take 2.5 mg by nebulization every 6 hours as needed for shortness of breath / dyspnea or wheezing       aspirin 81 MG EC tablet Take 81 mg by mouth daily       atorvastatin (LIPITOR) 20 MG tablet Take 1 tablet (20 mg) by mouth daily 90 tablet 4     bisacodyl (DULCOLAX) 5 MG EC tablet Take 1 tablet (5 mg) by mouth daily as needed for constipation       budesonide-formoterol  (SYMBICORT) 80-4.5 MCG/ACT Inhaler Inhale 2 puffs into the lungs 2 times daily       calcium carbonate-vitamin D (OS-CARLOS) 500-400 MG-UNIT tablet Take 1 tablet by mouth daily       ferrous sulfate (IRON) 325 (65 FE) MG tablet Take 325 mg by mouth daily (with breakfast)        furosemide (LASIX) 20 MG tablet Take 1 tablet (20 mg) by mouth daily 30 tablet 0     gabapentin (NEURONTIN) 300 MG capsule Take 300 mg by mouth every 12 hours        Immune Globulin, Human, (OCTAGAM) 5 GM/100ML SOLN 300 mg/kg into the vein every 30 days    Hold this medication while in TCU-resume at discharge from TCU       ipratropium - albuterol 0.5 mg/2.5 mg/3 mL (DUONEB) 0.5-2.5 (3) MG/3ML neb solution Take 1 vial by nebulization 3 times daily       levothyroxine (SYNTHROID/LEVOTHROID) 75 MCG tablet Take 1 tablet (75 mcg) by mouth daily 90 tablet 4     Lidocaine (LIDOCARE) 4 % Patch Place 2 patches onto the skin every 24 hours To prevent lidocaine toxicity, patient should be patch free for 12 hrs daily.       magic mouthwash suspension (diphenhydrAMINE, lidocaine, aluminum-magnesium & simethicone) Swish and swallow 5 mLs in mouth 4 times daily       metoprolol succinate ER (TOPROL-XL) 25 MG 24 hr tablet Take 1 tablet (25 mg) by mouth daily 90 tablet 4     senna-docusate (SENOKOT-S/PERICOLACE) 8.6-50 MG tablet Take 2 tablets by mouth 2 times daily as needed for constipation       sertraline (ZOLOFT) 50 MG tablet Take 1 tablet (50 mg) by mouth daily 90 tablet 1     sodium chloride (OCEAN) 0.65 % nasal spray Spray 1 spray into both nostrils every hour as needed for congestion       traZODone (DESYREL) 50 MG tablet Take 2 tablets (100 mg) by mouth At Bedtime 90 tablet 0         REVIEW OF SYSTEMS:  4 point ROS including Respiratory, CV, GI and , other than that noted in the HPI,  is negative    Objective:  /65   Pulse 68   Temp 97.9  F (36.6  C)   Resp 17   Wt 46.7 kg (103 lb)   SpO2 93%   BMI 20.80 kg/m     Exam:  Resp: Effort WNL,  LS decreased bilateral bases  CV: VS as above, no edema  Abd- soft, nontender, BS +  Musc- MADRIGAL  Psych- alert, calm, pleasant      Labs:   Recent labs in [facility] reviewed by me today.     ASSESSMENT/PLAN:     Coronary artery disease involving native coronary artery of native heart without angina pectoris  - had isolated incidence of chest pain in TCU with no associated s/s and resolved on its own without further issue.  - continues on metoprolol, statin and low dose asa   Nonischemic cardiomyopathy (H)  Chronic congestive heart failure, unspecified heart failure type (H)  - LE edema resolved- weights trended down in TCU but was substantially fluid up upon admit likely 2/2 IVF inpatient. Has been in 101-102 range last two weeks. Appears euvolemic.   - EF 30-35% on Echo last  - continue on BB  - increased lasix from 20 mg daily to 40 mg x 3 days early on, then resumed 20 mg  - follow weights   - follow with cardiology as directed     Pneumonia of left lung due to infectious organism, unspecified part of lung  COPD exacerbation (H)  Chronic obstructive pulmonary disease, unspecified COPD type (H)  - Completed Augmentin course. Repeat CXR clear, WBC WNL, afebrile. LS very decreased and still wears O2. Was just prn at home-   - recently scheduled Duonebs 2/2 COPD and some wheezing- and add quick burst of Prednisone for ? COPD exac and in effort to attempt to further wean patient from O2- resp status improved and is on and off O2 but is still requiring d/t desats < 88% both at rest and with activity  - patient has nebulizer at home  - Home O2 will be reordered through current company as below    Shoulder fracture  - NWB, immobilized- saw ortho (Dr. Ramey) 8/7- and then 9/3. Is still NWB - immobilizer is off   - weaned off of low dose oxycodone taper  - continues on prn acetaminophen and on gabapentin (increased in TCU early on)  - f/w ortho as directed which is 4 weeks from 9/3 appt  Closed facture of left olecranon  process with routine healing, subsequent encounter  -only identified upon x-rays per ortho at 9/3 appt  - Dr. Ramey ordered to avoid resistance to the elbow with flexion/extension, supination/pronation, can do active ROM Shoulder AROM and AAROM with pulley and wand d/t elbow  Closed nondisplaced fracture of pelvis with routine healing, unspecified part of pelvis, subsequent encounter  - Healing well. Made good gains in rehab. WBAT  - F/w ortho as directed  Pelvic hematoma, female  - mass effect pressing on urinary bladder- HGB has remained stable  - Rees out and patient voiding without issue  Closed head injury, subsequent encounter-   - neurocog stable/baseline and scalp hematoma resolved     Multiple skin tears  - Dr. Todd and dietician followed. Wounds to left lower leg, elbow and left shoulder are closed and dry.         Mouth ulcers  - multiple oval aphthous ulcers noted to tongue and roof of mouth-  ? Stomatitis early on, resolved without further issue       CLL (chronic lymphocytic leukemia) (H)  - WBC ,000 and with thrombocytopenia- baseline- has been stable at baseline in TCU  - dx 2007- no chemo since 2017 2/2 overall health  - Will need to schedule f/w Dr. Ivory        Anxiety   mood stable.   - prn alprazolam added inpatient, not utilized in TCU so discontinued and added daily prn dose of hydroxyzine- but  discontinued as not utilizing        Physical deconditioning   2/2 above  - [place]  -planning for discharge end of week or early next week         Electronically signed by:  NOHEMI Zee CNP                 Sincerely,        NOHEMI Zee CNP

## 2019-09-10 NOTE — PROGRESS NOTES
[place] [facility]  [place] Medical Record Number:  [id number]  Place of Service where encounter took place:  [facility]  ([facility]) [[id number]]  Chief Complaint   Patient presents with     RECHECK       HPI:    Marie Kline  is a 87 year old (4/28/1932), who is being seen today for an episodic care visit.  HPI information obtained from: facility chart records, facility staff, patient report and [facility] chart review. Today's concern is:    Patient in TCU following fall and shoulder fracture, pelvis fracture, CHI and multiple skn tears, as well as PNA. Incidental finding of left olecranon fracture by ortho at f/u appt. Skin tears are healed. Weaning from O2, but does still drop < 88% at times so will likely require for home. Has made good gains in rehab.    Past Medical and Surgical History reviewed in [facility] today.    Today is alert, calm, NAD. Denies pain. Denies SOB, cough. Reports O2 sats drop < 88% at times yet. Is wearing O2 prn and overnights. Discussed that will likely need for home and patient verbalized understanding of this.     MEDICATIONS:  Current Outpatient Medications   Medication Sig Dispense Refill     ACETAMINOPHEN PO Take 1,000 mg by mouth every 8 hours And every 8 hours as needed       albuterol (PROAIR HFA/PROVENTIL HFA/VENTOLIN HFA) 108 (90 BASE) MCG/ACT Inhaler Inhale 2 puffs into the lungs every 6 hours as needed for wheezing 1 Inhaler 8     albuterol (PROVENTIL) (2.5 MG/3ML) 0.083% neb solution Take 2.5 mg by nebulization every 6 hours as needed for shortness of breath / dyspnea or wheezing       aspirin 81 MG EC tablet Take 81 mg by mouth daily       atorvastatin (LIPITOR) 20 MG tablet Take 1 tablet (20 mg) by mouth daily 90 tablet 4     bisacodyl (DULCOLAX) 5 MG EC tablet Take 1 tablet (5 mg) by mouth daily as needed for constipation       budesonide-formoterol (SYMBICORT) 80-4.5 MCG/ACT Inhaler Inhale 2 puffs into the lungs 2 times daily       calcium  carbonate-vitamin D (OS-CARLOS) 500-400 MG-UNIT tablet Take 1 tablet by mouth daily       ferrous sulfate (IRON) 325 (65 FE) MG tablet Take 325 mg by mouth daily (with breakfast)        furosemide (LASIX) 20 MG tablet Take 1 tablet (20 mg) by mouth daily 30 tablet 0     gabapentin (NEURONTIN) 300 MG capsule Take 300 mg by mouth every 12 hours        Immune Globulin, Human, (OCTAGAM) 5 GM/100ML SOLN 300 mg/kg into the vein every 30 days    Hold this medication while in TCU-resume at discharge from TCU       ipratropium - albuterol 0.5 mg/2.5 mg/3 mL (DUONEB) 0.5-2.5 (3) MG/3ML neb solution Take 1 vial by nebulization 3 times daily       levothyroxine (SYNTHROID/LEVOTHROID) 75 MCG tablet Take 1 tablet (75 mcg) by mouth daily 90 tablet 4     Lidocaine (LIDOCARE) 4 % Patch Place 2 patches onto the skin every 24 hours To prevent lidocaine toxicity, patient should be patch free for 12 hrs daily.       magic mouthwash suspension (diphenhydrAMINE, lidocaine, aluminum-magnesium & simethicone) Swish and swallow 5 mLs in mouth 4 times daily       metoprolol succinate ER (TOPROL-XL) 25 MG 24 hr tablet Take 1 tablet (25 mg) by mouth daily 90 tablet 4     senna-docusate (SENOKOT-S/PERICOLACE) 8.6-50 MG tablet Take 2 tablets by mouth 2 times daily as needed for constipation       sertraline (ZOLOFT) 50 MG tablet Take 1 tablet (50 mg) by mouth daily 90 tablet 1     sodium chloride (OCEAN) 0.65 % nasal spray Spray 1 spray into both nostrils every hour as needed for congestion       traZODone (DESYREL) 50 MG tablet Take 2 tablets (100 mg) by mouth At Bedtime 90 tablet 0         REVIEW OF SYSTEMS:  4 point ROS including Respiratory, CV, GI and , other than that noted in the HPI,  is negative    Objective:  /65   Pulse 68   Temp 97.9  F (36.6  C)   Resp 17   Wt 46.7 kg (103 lb)   SpO2 93%   BMI 20.80 kg/m    Exam:  Resp: Effort WNL, LS decreased bilateral bases  CV: VS as above, no edema  Abd- soft, nontender, BS +  Musc-  KAITLIN  Psych- alert, calm, pleasant      Labs:   Recent labs in [facility] reviewed by me today.     ASSESSMENT/PLAN:     Coronary artery disease involving native coronary artery of native heart without angina pectoris  - had isolated incidence of chest pain in TCU with no associated s/s and resolved on its own without further issue.  - continues on metoprolol, statin and low dose asa   Nonischemic cardiomyopathy (H)  Chronic congestive heart failure, unspecified heart failure type (H)  - LE edema resolved- weights trended down in TCU but was substantially fluid up upon admit likely 2/2 IVF inpatient. Has been in 101-102 range last two weeks. Appears euvolemic.   - EF 30-35% on Echo last  - continue on BB  - increased lasix from 20 mg daily to 40 mg x 3 days early on, then resumed 20 mg  - follow weights   - follow with cardiology as directed     Pneumonia of left lung due to infectious organism, unspecified part of lung  COPD exacerbation (H)  Chronic obstructive pulmonary disease, unspecified COPD type (H)  - Completed Augmentin course. Repeat CXR clear, WBC WNL, afebrile. LS very decreased and still wears O2. Was just prn at home-   - recently scheduled Duonebs 2/2 COPD and some wheezing- and add quick burst of Prednisone for ? COPD exac and in effort to attempt to further wean patient from O2- resp status improved and is on and off O2 but is still requiring d/t desats < 88% both at rest and with activity  - patient has nebulizer at home  - Home O2 will be reordered through current company as below    Shoulder fracture  - NWB, immobilized- saw ortho (Dr. Ramey) 8/7- and then 9/3. Is still NWB - immobilizer is off   - weaned off of low dose oxycodone taper  - continues on prn acetaminophen and on gabapentin (increased in TCU early on)  - f/w ortho as directed which is 4 weeks from 9/3 appt  Closed facture of left olecranon process with routine healing, subsequent encounter  -only identified upon x-rays per ortho at 9/3  appt  - Dr. Ramey ordered to avoid resistance to the elbow with flexion/extension, supination/pronation, can do active ROM Shoulder AROM and AAROM with pulley and wand d/t elbow  Closed nondisplaced fracture of pelvis with routine healing, unspecified part of pelvis, subsequent encounter  - Healing well. Made good gains in rehab. WBAT  - F/w ortho as directed  Pelvic hematoma, female  - mass effect pressing on urinary bladder- HGB has remained stable  - Rees out and patient voiding without issue  Closed head injury, subsequent encounter-   - neurocog stable/baseline and scalp hematoma resolved     Multiple skin tears  - Dr. Todd and dietician followed. Wounds to left lower leg, elbow and left shoulder are closed and dry.         Mouth ulcers  - multiple oval aphthous ulcers noted to tongue and roof of mouth-  ? Stomatitis early on, resolved without further issue       CLL (chronic lymphocytic leukemia) (H)  - WBC ,000 and with thrombocytopenia- baseline- has been stable at baseline in TCU  - dx 2007- no chemo since 2017 2/2 overall health  - Will need to schedule f/w Dr. Ivory        Anxiety   mood stable.   - prn alprazolam added inpatient, not utilized in TCU so discontinued and added daily prn dose of hydroxyzine- but  discontinued as not utilizing        Physical deconditioning   2/2 above  - [place]  -planning for discharge end of week or early next week         Electronically signed by:  NOHEMI Zee CNP

## 2019-09-12 ENCOUNTER — DISCHARGE SUMMARY NURSING HOME (OUTPATIENT)
Dept: GERIATRICS | Facility: CLINIC | Age: 84
End: 2019-09-12
Payer: MEDICARE

## 2019-09-12 VITALS
SYSTOLIC BLOOD PRESSURE: 121 MMHG | DIASTOLIC BLOOD PRESSURE: 73 MMHG | HEIGHT: 59 IN | WEIGHT: 101.6 LBS | OXYGEN SATURATION: 91 % | HEART RATE: 77 BPM | BODY MASS INDEX: 20.48 KG/M2 | RESPIRATION RATE: 18 BRPM | TEMPERATURE: 98.4 F

## 2019-09-12 DIAGNOSIS — S09.90XD CLOSED HEAD INJURY, SUBSEQUENT ENCOUNTER: ICD-10-CM

## 2019-09-12 DIAGNOSIS — I25.119 CORONARY ARTERY DISEASE INVOLVING NATIVE HEART WITH ANGINA PECTORIS, UNSPECIFIED VESSEL OR LESION TYPE (H): ICD-10-CM

## 2019-09-12 DIAGNOSIS — N94.89 PELVIC HEMATOMA, FEMALE: ICD-10-CM

## 2019-09-12 DIAGNOSIS — I50.32 CHRONIC DIASTOLIC CONGESTIVE HEART FAILURE (H): ICD-10-CM

## 2019-09-12 DIAGNOSIS — S42.92XD CLOSED FRACTURE OF LEFT SHOULDER WITH ROUTINE HEALING, SUBSEQUENT ENCOUNTER: Primary | ICD-10-CM

## 2019-09-12 DIAGNOSIS — S32.9XXD CLOSED NONDISPLACED FRACTURE OF PELVIS WITH ROUTINE HEALING, UNSPECIFIED PART OF PELVIS, SUBSEQUENT ENCOUNTER: ICD-10-CM

## 2019-09-12 DIAGNOSIS — J44.9 CHRONIC OBSTRUCTIVE PULMONARY DISEASE, UNSPECIFIED COPD TYPE (H): ICD-10-CM

## 2019-09-12 DIAGNOSIS — J18.9 PNEUMONIA OF LEFT LUNG DUE TO INFECTIOUS ORGANISM, UNSPECIFIED PART OF LUNG: ICD-10-CM

## 2019-09-12 DIAGNOSIS — T14.8XXA MULTIPLE SKIN TEARS: ICD-10-CM

## 2019-09-12 DIAGNOSIS — S52.022D CLOSED FRACTURE OF LEFT OLECRANON PROCESS WITH ROUTINE HEALING, SUBSEQUENT ENCOUNTER: ICD-10-CM

## 2019-09-12 DIAGNOSIS — J44.1 COPD EXACERBATION (H): ICD-10-CM

## 2019-09-12 DIAGNOSIS — I42.8 NONISCHEMIC CARDIOMYOPATHY (H): ICD-10-CM

## 2019-09-12 DIAGNOSIS — K12.1 MOUTH ULCERS: ICD-10-CM

## 2019-09-12 DIAGNOSIS — R53.81 PHYSICAL DECONDITIONING: ICD-10-CM

## 2019-09-12 DIAGNOSIS — C91.10 CLL (CHRONIC LYMPHOCYTIC LEUKEMIA) (H): ICD-10-CM

## 2019-09-12 DIAGNOSIS — F41.9 ANXIETY: ICD-10-CM

## 2019-09-12 PROCEDURE — 99316 NF DSCHRG MGMT 30 MIN+: CPT | Performed by: NURSE PRACTITIONER

## 2019-09-12 ASSESSMENT — MIFFLIN-ST. JEOR: SCORE: 801.48

## 2019-09-12 NOTE — PROGRESS NOTES
[facility] DISCHARGE SUMMARY  PATIENT'S NAME: Marie Kline  YOB: 1932  MEDICAL RECORD NUMBER:  [id number]  Place of Service where encounter took place:  [facility]  ([facility]) [[id number]]    PRIMARY CARE PROVIDER AND CLINIC RESPONSIBLE AFTER TRANSFER:   [facility] [address] [facility] [address]    [facility]     Transferring providers: NOHEMI Zee CNP  Recent Hospitalization/ED:  [facility] stay 7/15/19 to 7/24/19.  Date of SNF Admission: July / 24 / 2019  Date of SNF (anticipated) Discharge: September / 14 / 2019  Discharged to: previous assisted living  Cognitive Scores: SLUMS 30/30, CPT 4.5/5.6  Physical Function: SBA for UBD and frequent cues for O2 tubing. Min A for lower body dressing cues for clothing management. Set up with G/H. Ambulating 300 feet [place] with fWW. CGA with transfers   DME: Home Oxygen    CODE STATUS/ADVANCE DIRECTIVES DISCUSSION:  Full Code     ALLERGIES: Diagnostic x-ray materials; Contrast dye; Prolia [denosumab]; and Wound dressing adhesive    DISCHARGE DIAGNOSIS/NURSING FACILITY COURSE:     87 y.o female with PMH CLL, h/o takosubo cardiomyopathy, CHF, CAD- (EF 30-35%), HTN, COPD- on PRN O2 at home, hypothyroidism admitted to hospital as above after fall with resultant left shoulder fracture, pelvic fracture with adjacent hematoma with mass affect on urinary bladder, multiple skin tears and closed head injury and scalp hematoma. Orthopedics consulted and recommended nonoperative management for shoulder fracture. Fever noted inpatient with max 103. Blood cultures and UA negative, CXR revealed RLL PNA Tx with 5 days meronpenem and then completed oral Augmentin. Was admitted to TCU for acute rehab and medical management. Perez HOOK followed for wounds which are all now healed. Followed with Dr. Ramey of orthopedics while in TCU. Incidental finding of displaced left  olecranon fracture at time of 9/3 appt. Patient made slow steady rehab progress. Discharge functional status is as above.        Problems addressed in TCU:          Shoulder fracture  - NWB, immobilized- saw ortho (Dr. Ramey) 8/7- and then 9/3. Is still NWB - immobilizer is off   - weaned off of low dose oxycodone taper  - continues on prn acetaminophen and on gabapentin (increased in TCU early on)  - f/w ortho as directed which is 4 weeks from 9/3 appt  Closed facture of left olecranon process with routine healing, subsequent encounter  -only identified upon x-rays per ortho at 9/3 appt  - Dr. Ramey ordered to avoid resistance to the elbow with flexion/extension, supination/pronation, can do active ROM Shoulder AROM and AAROM with pulley and wand d/t elbow  Closed nondisplaced fracture of pelvis with routine healing, unspecified part of pelvis, subsequent encounter  - Healing well. Made good gains in rehab. WBAT  - F/w ortho as directed  Pelvic hematoma, female  - mass effect pressing on urinary bladder- HGB has remained stable  - Rees out and patient voiding without issue  Closed head injury, subsequent encounter-   - neurocog stable/baseline and scalp hematoma resolved    Multiple skin tears  - Dr. Todd and dietician followed. Wounds to left lower leg, elbow and left shoulder are closed and dry.      Pneumonia of left lung due to infectious organism, unspecified part of lung  COPD exacerbation (H)  - Completed Augmentin course. Repeat CXR clear, WBC WNL, afebrile. LS very decreased and still wears O2. Was just prn at home-   - recently scheduled Duonebs 2/2 COPD and some wheezing- and add quick burst of Prednisone for ? COPD exac and in effort to attempt to further wean patient from O2- resp status improved and is on and off O2 but is still requiring d/t desats < 88% both at rest and with activity  - patient has nebulizer at home  - Home O2 will be reordered through current company as below     Coronary  artery disease involving native coronary artery of native heart without angina pectoris  - had isolated incidence of chest pain in TCU with no associated s/s and resolved on its own without further issue.  - continues on metoprolol, statin and low dose asa   Nonischemic cardiomyopathy (H)  Chronic congestive heart failure, unspecified heart failure type (H)  - LE edema resolved- weights trended down in TCU but was substantially fluid up upon admit likely 2/2 IVF inpatient. Has been in 101-102 range last two weeks. Appears euvolemic.   - EF 30-35% on Echo last  - continue on BB  - increased lasix from 20 mg daily to 40 mg x 3 days early on, then resumed 20 mg  - follow weights   - follow with cardiology as directed     Mouth ulcers  - multiple oval aphthous ulcers noted to tongue and roof of mouth-  ? Stomatitis early on, resolved without further issuei          CLL (chronic lymphocytic leukemia) (H)  - WBC ,000 and with thrombocytopenia- baseline- has been stable at baseline in TCU  - dx 2007- no chemo since 2017 2/2 overall health  - Will need to schedule f/w Dr. Ivory        Anxiety   mood stable.   - prn alprazolam added inpatient, not utilized in TCU so discontinued and added daily prn dose of hydroxyzine- but  discontinued as not utilizing        Physical deconditioning   2/2 above  - [place]  - home with home care as outlined     Past Medical History:  has a past medical history of Arthritis, Bladder cancer (H), Bladder cancer (H), Cardiomyopathy (H), CLL (chronic lymphocytic leukemia) (H), Congestive heart failure (H) (10/24/2014), COPD (chronic obstructive pulmonary disease) (H), Hypertension, Hypothyroid, Mumps, Myocardial infarction (H) (10/2014), Pulmonary edema cardiac cause (H) (10/08/2014), and Tricuspid valve disorders, 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 She also has no past medical history of Asymptomatic human immunodeficiency virus (HIV) infection status (H), Cerebral  palsy (H), Chronic infection, Complication of anesthesia, Congenital renal agenesis and dysgenesis, Goiter, Gout, Hernia, abdominal, History of blood transfusion, History of spinal cord injury, History of thrombophlebitis, Horseshoe kidney, Hydrocephalus, Malignant hyperthermia, Palpitations, Parkinsons disease (H), Sleep apnea, Spider veins, Spina bifida (H), STD (sexually transmitted disease), Tethered cord (H), or Tuberculosis.    Discharge Medications:  Current Outpatient Medications   Medication Sig Dispense Refill     ACETAMINOPHEN PO Take 1,000 mg by mouth every 8 hours And every 8 hours as needed       albuterol (PROAIR HFA/PROVENTIL HFA/VENTOLIN HFA) 108 (90 BASE) MCG/ACT Inhaler Inhale 2 puffs into the lungs every 6 hours as needed for wheezing 1 Inhaler 8     albuterol (PROVENTIL) (2.5 MG/3ML) 0.083% neb solution Take 2.5 mg by nebulization every 6 hours as needed for shortness of breath / dyspnea or wheezing       aspirin 81 MG EC tablet Take 81 mg by mouth daily       atorvastatin (LIPITOR) 20 MG tablet Take 1 tablet (20 mg) by mouth daily 90 tablet 4     bisacodyl (DULCOLAX) 5 MG EC tablet Take 1 tablet (5 mg) by mouth daily as needed for constipation       budesonide-formoterol (SYMBICORT) 80-4.5 MCG/ACT Inhaler Inhale 2 puffs into the lungs 2 times daily       calcium carbonate-vitamin D (OS-CARLOS) 500-400 MG-UNIT tablet Take 1 tablet by mouth daily       ferrous sulfate (IRON) 325 (65 FE) MG tablet Take 325 mg by mouth daily (with breakfast)        furosemide (LASIX) 20 MG tablet Take 1 tablet (20 mg) by mouth daily 30 tablet 0     gabapentin (NEURONTIN) 300 MG capsule Take 300 mg by mouth every 12 hours        Immune Globulin, Human, (OCTAGAM) 5 GM/100ML SOLN 300 mg/kg into the vein every 30 days    Hold this medication while in TCU-resume at discharge from TCU       ipratropium - albuterol 0.5 mg/2.5 mg/3 mL (DUONEB) 0.5-2.5 (3) MG/3ML neb solution Take 1 vial by nebulization 3 times daily        "levothyroxine (SYNTHROID/LEVOTHROID) 75 MCG tablet Take 1 tablet (75 mcg) by mouth daily 90 tablet 4     Lidocaine (LIDOCARE) 4 % Patch Place 2 patches onto the skin every 24 hours To prevent lidocaine toxicity, patient should be patch free for 12 hrs daily.       magic mouthwash suspension (diphenhydrAMINE, lidocaine, aluminum-magnesium & simethicone) Swish and swallow 5 mLs in mouth 4 times daily       metoprolol succinate ER (TOPROL-XL) 25 MG 24 hr tablet Take 1 tablet (25 mg) by mouth daily 90 tablet 4     senna-docusate (SENOKOT-S/PERICOLACE) 8.6-50 MG tablet Take 2 tablets by mouth 2 times daily as needed for constipation       sertraline (ZOLOFT) 50 MG tablet Take 1 tablet (50 mg) by mouth daily 90 tablet 1     sodium chloride (OCEAN) 0.65 % nasal spray Spray 1 spray into both nostrils every hour as needed for congestion       traZODone (DESYREL) 50 MG tablet Take 2 tablets (100 mg) by mouth At Bedtime 90 tablet 0       Medication Changes/Rationale:     As above    Controlled medications sent with patient:   not applicable/none     ROS:   4 point ROS including Respiratory, CV, GI and , other than that noted in the HPI,  is negative    Physical Exam:   Vitals: /73   Pulse 77   Temp 98.4  F (36.9  C)   Resp 18   Ht 1.499 m (4' 11\")   Wt 46.1 kg (101 lb 9.6 oz)   SpO2 91%   BMI 20.52 kg/m    BMI= Body mass index is 20.52 kg/m .    Resp: Effort WNL, LSC but decreased posteriorly bilaterally  CV: VS as above, no edema  Abd- soft, nontender, BS +  Musc- MADRIGAL  Psych- alert, calm, pleasant      SNF labs: Recent labs in [facility] reviewed by me today.       DISCHARGE PLAN:    Follow up labs: No labs orders/due    Medical Follow Up:      Follow up with primary care provider in 1 week    MT referral needed and placed by this provider: Subha    Current [place] scheduled appointments:       Discharge Services: Home Care:  Occupational Therapy, Physical Therapy, Registered Nurse and Home Health Aide    Discharge " Instructions Verbalized to Patient at Discharge:     As above      TOTAL DISCHARGE TIME:   Greater than 30 minutes  Electronically signed by:  NOHEMI Zee CNP       Face to Face and Medical Necessity Statement for DME Provider visit    Demographic Information on Marie Kline:  Gender: female  : 1932  [address]  [place] [address] [phone] (home)     Medical Record: [id number]  Social Security Number: [id number]  Primary Care Provider: Piper Kiser  Insurance: Payor: MEDICARE / Plan: MEDICARE / Product Type: Medicare /     HPI:   Marie Kline is a 87 year old  (1932), who is being seen today for a face to face provider visit at [facility]; medical necessity statement for DME included. This patient requires the following:  DME Ordered and Medical Necessity Statement   Oxygen: 2 L/min via NC continuous and needs portable tank due to mobility in home environment ; Clinical Documentation: (Obtain documented RA sat with in 2 days of discharge in acute setting or within 30 days of provider visit):  Method 1: Room air at rest: Qualifing sat level is < 88% or below on room air at rest and was 87% at rest on RA on 2019.     Due to desaturation of SAO2 < 88% on room air from COPD, home oxygen therapy will benefit my patient's condition. The patient has tried oral medications with limited success and oxygen is till required The patient is mobile in the home and requires portability.     Pt needing above DME with expected length of need of 99 months  due to medical necessity associated with following diagnosis:       Chronic obstructive pulmonary disease, unspecified COPD type (H)        PMH   has a past medical history of Arthritis, Bladder cancer (H), Bladder cancer (H), Cardiomyopathy (H), CLL (chronic lymphocytic leukemia) (H), Congestive heart failure (H) (10/24/2014), COPD (chronic obstructive pulmonary disease) (H), Hypertension,  "Hypothyroid, Mumps, Myocardial infarction (H) (10/2014), Pulmonary edema cardiac cause (H) (10/08/2014), and Tricuspid valve disorders, 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 She also has no past medical history of Asymptomatic human immunodeficiency virus (HIV) infection status (H), Cerebral palsy (H), Chronic infection, Complication of anesthesia, Congenital renal agenesis and dysgenesis, Goiter, Gout, Hernia, abdominal, History of blood transfusion, History of spinal cord injury, History of thrombophlebitis, Horseshoe kidney, Hydrocephalus, Malignant hyperthermia, Palpitations, Parkinsons disease (H), Sleep apnea, Spider veins, Spina bifida (H), STD (sexually transmitted disease), Tethered cord (H), or Tuberculosis.    ROS:as above    EXAM  Vitals: /73   Pulse 77   Temp 98.4  F (36.9  C)   Resp 18   Ht 1.499 m (4' 11\")   Wt 46.1 kg (101 lb 9.6 oz)   SpO2 91%   BMI 20.52 kg/m  ;BMI= Body mass index is 20.52 kg/m .    Exam: as above     ASSESSMENT/PLAN:  1. Chronic obstructive pulmonary disease, unspecified COPD type (H)        Orders:  1. DME as above    ELECTRONICALLY SIGNED BY CESAR CERTIFIED PROVIDER:  NOHEMI Zee CNP   NPI: [id number]  [facility]  [address] [place] [address], [address]  [place] [address]          Home care Face to Face documentation done in [facility] attached to Home care orders for [facility].                   "

## 2019-09-12 NOTE — LETTER
9/12/2019        RE: Marie Kline  [address]  [facility] [address]        [employer] [facility] DISCHARGE SUMMARY  PATIENT'S NAME: Marie Kline  YOB: 1932  MEDICAL RECORD NUMBER:  [id number]  Place of Service where encounter took place:  [facility]  (S) [[id number]]    PRIMARY CARE PROVIDER AND CLINIC RESPONSIBLE AFTER TRANSFER:   Piper Kiser MD, [address] / [facility] [address]    [facility]     Transferring providers: NOHEMI Zee CNP  Recent Hospitalization/ED:  [facility] Hospital stay 7/15/19 to 7/24/19.  Date of SNF Admission: July / 24 / 2019  Date of SNF (anticipated) Discharge: September / 14 / 2019  Discharged to: previous assisted living  Cognitive Scores: SLUMS 30/30, CPT 4.5/5.6  Physical Function: SBA for UBD and frequent cues for O2 tubing. Min A for lower body dressing cues for clothing management. Set up with G/H. Ambulating 300 feet Inedep with fWW. CGA with transfers   DME: Home Oxygen    CODE STATUS/ADVANCE DIRECTIVES DISCUSSION:  Full Code     ALLERGIES: Diagnostic x-ray materials; Contrast dye; Prolia [denosumab]; and Wound dressing adhesive    DISCHARGE DIAGNOSIS/NURSING FACILITY COURSE:     87 y.o female with PMH CLL, h/o takosubo cardiomyopathy, CHF, CAD- (EF 30-35%), HTN, COPD- on PRN O2 at home, hypothyroidism admitted to hospital as above after fall with resultant left shoulder fracture, pelvic fracture with adjacent hematoma with mass affect on urinary bladder, multiple skin tears and closed head injury and scalp hematoma. Orthopedics consulted and recommended nonoperative management for shoulder fracture. Fever noted inpatient with max 103. Blood cultures and UA negative, CXR revealed RLL PNA Tx with 5 days meronpenem and then completed oral Augmentin. Was admitted to TCU for acute rehab and medical management. Perez HOOK followed for wounds which are all now  healed. Followed with Dr. Ramey of orthopedics while in TCU. Incidental finding of displaced left olecranon fracture at time of 9/3 appt. Patient made slow steady rehab progress. Discharge functional status is as above.        Problems addressed in TCU:          Shoulder fracture  - NWB, immobilized- saw ortho  (Dr. Ramey) 8/7- and then 9/3. Is still NWB - immobilizer is off   - weaned off of low dose oxycodone taper  - continues on prn acetaminophen and on gabapentin (increased in TCU early on)  - f/w ortho as directed which is 4 weeks from 9/3 appt  Closed facture of left olecranon process with routine healing, subsequent encounter  -only identified upon x-rays per ortho at 9/3 appt  - Dr. Ramey ordered to avoid resistance to the elbow with flexion/extension, supination/pronation, can do active ROM Shoulder AROM and AAROM with pulley and wand d/t elbow  Closed nondisplaced fracture of pelvis with routine healing, unspecified part of pelvis, subsequent encounter  - Healing well. Made good gains in rehab. WBAT  - F/w ortho as directed  Pelvic hematoma, female  - mass effect pressing on urinary bladder- HGB has remained stable  - Rees out and patient voiding without issue  Closed head injury, subsequent encounter-   - neurocog stable/baseline and scalp hematoma resolved    Multiple skin tears  - Dr. Todd and dietician followed. Wounds to left lower leg, elbow and left shoulder are closed and dry.      Pneumonia of left lung due to infectious organism, unspecified part of lung  COPD exacerbation (H)  - Completed Augmentin course. Repeat CXR clear, WBC WNL, afebrile. LS very decreased and still wears O2. Was just prn at home-   - recently scheduled Duonebs 2/2 COPD and some wheezing- and add quick burst of Prednisone for ? COPD exac and in effort to attempt to further wean patient from O2- resp status improved and is on and off O2 but is still requiring d/t desats < 88% both at rest and with activity  -  patient has nebulizer at home  - Home O2 will be reordered through current company as below     Coronary artery disease involving native coronary artery of native heart without angina pectoris  - had isolated incidence of chest pain in TCU with no associated s/s and resolved on its own without further issue.  - continues on metoprolol, statin and low dose asa   Nonischemic cardiomyopathy (H)  Chronic congestive heart failure, unspecified heart failure type (H)  - LE edema resolved- weights trended down in TCU but was substantially fluid up upon admit likely 2/2 IVF inpatient. Has been in 101-102 range last two weeks. Appears euvolemic.   - EF 30-35% on Echo last  - continue on BB  - increased lasix from 20 mg daily to 40 mg x 3 days early on, then resumed 20 mg  - follow weights   - follow with cardiology as directed     Mouth ulcers  - multiple oval aphthous ulcers noted to tongue and roof of mouth-  ? Stomatitis early on, resolved without further issuei          CLL (chronic lymphocytic leukemia) (H)  - WBC ,000 and with thrombocytopenia- baseline- has been stable at baseline in TCU  - dx 2007- no chemo since 2017 2/2 overall health  - Will need to schedule f/w Dr. Ivory        Anxiety   mood stable.   - prn alprazolam added inpatient, not utilized in TCU so discontinued and added daily prn dose of hydroxyzine- but  discontinued as not utilizing        Physical deconditioning   2/2 above  - [place]  - home with home care as outlined     Past Medical History:  has a past medical history of Arthritis, Bladder cancer (H), Bladder cancer (H), Cardiomyopathy (H), CLL (chronic lymphocytic leukemia) (H), Congestive heart failure (H) (10/24/2014), COPD (chronic obstructive pulmonary disease) (H), Hypertension, Hypothyroid, Mumps, Myocardial infarction (H) (10/2014), Pulmonary edema cardiac cause (H) (10/08/2014), and Tricuspid valve disorders, 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 She also has  no past medical history of Asymptomatic human immunodeficiency virus (HIV) infection status (H), Cerebral palsy (H), Chronic infection, Complication of anesthesia, Congenital renal agenesis and dysgenesis, Goiter, Gout, Hernia, abdominal, History of blood transfusion, History of spinal cord injury, History of thrombophlebitis, Horseshoe kidney, Hydrocephalus, Malignant hyperthermia, Palpitations, Parkinsons disease (H), Sleep apnea, Spider veins, Spina bifida (H), STD (sexually transmitted disease), Tethered cord (H), or Tuberculosis.    Discharge Medications:  Current Outpatient Medications   Medication Sig Dispense Refill     ACETAMINOPHEN PO Take 1,000 mg by mouth every 8 hours And every 8 hours as needed       albuterol (PROAIR HFA/PROVENTIL HFA/VENTOLIN HFA) 108 (90 BASE) MCG/ACT Inhaler Inhale 2 puffs into the lungs every 6 hours as needed for wheezing 1 Inhaler 8     albuterol (PROVENTIL) (2.5 MG/3ML) 0.083% neb solution Take 2.5 mg by nebulization every 6 hours as needed for shortness of breath / dyspnea or wheezing       aspirin 81 MG EC tablet Take 81 mg by mouth daily       atorvastatin (LIPITOR) 20 MG tablet Take 1 tablet (20 mg) by mouth daily 90 tablet 4     bisacodyl (DULCOLAX) 5 MG EC tablet Take 1 tablet (5 mg) by mouth daily as needed for constipation       budesonide-formoterol (SYMBICORT) 80-4.5 MCG/ACT Inhaler Inhale 2 puffs into the lungs 2 times daily       calcium carbonate-vitamin D (OS-CARLOS) 500-400 MG-UNIT tablet Take 1 tablet by mouth daily       ferrous sulfate (IRON) 325 (65 FE) MG tablet Take 325 mg by mouth daily (with breakfast)        furosemide (LASIX) 20 MG tablet Take 1 tablet (20 mg) by mouth daily 30 tablet 0     gabapentin (NEURONTIN) 300 MG capsule Take 300 mg by mouth every 12 hours        Immune Globulin, Human, (OCTAGAM) 5 GM/100ML SOLN 300 mg/kg into the vein every 30 days    Hold this medication while in TCU-resume at discharge from TCU       ipratropium - albuterol 0.5  "mg/2.5 mg/3 mL (DUONEB) 0.5-2.5 (3) MG/3ML neb solution Take 1 vial by nebulization 3 times daily       levothyroxine (SYNTHROID/LEVOTHROID) 75 MCG tablet Take 1 tablet (75 mcg) by mouth daily 90 tablet 4     Lidocaine (LIDOCARE) 4 % Patch Place 2 patches onto the skin every 24 hours To prevent lidocaine toxicity, patient should be patch free for 12 hrs daily.       magic mouthwash suspension (diphenhydrAMINE, lidocaine, aluminum-magnesium & simethicone) Swish and swallow 5 mLs in mouth 4 times daily       metoprolol succinate ER (TOPROL-XL) 25 MG 24 hr tablet Take 1 tablet (25 mg) by mouth daily 90 tablet 4     senna-docusate (SENOKOT-S/PERICOLACE) 8.6-50 MG tablet Take 2 tablets by mouth 2 times daily as needed for constipation       sertraline (ZOLOFT) 50 MG tablet Take 1 tablet (50 mg) by mouth daily 90 tablet 1     sodium chloride (OCEAN) 0.65 % nasal spray Spray 1 spray into both nostrils every hour as needed for congestion       traZODone (DESYREL) 50 MG tablet Take 2 tablets (100 mg) by mouth At Bedtime 90 tablet 0       Medication Changes/Rationale:     As above    Controlled medications sent with patient:   not applicable/none     ROS:   4 point ROS including Respiratory, CV, GI and , other than that noted in the HPI,  is negative    Physical Exam:   Vitals: /73   Pulse 77   Temp 98.4  F (36.9  C)   Resp 18   Ht 1.499 m (4' 11\")   Wt 46.1 kg (101 lb 9.6 oz)   SpO2 91%   BMI 20.52 kg/m     BMI= Body mass index is 20.52 kg/m .    Resp: Effort WNL, LSC but decreased posteriorly bilaterally  CV: VS as above, no edema  Abd- soft, nontender, BS +  Musc- MADRIGAL  Psych- alert, calm, pleasant      SNF labs: Recent labs in [facility] reviewed by me today.       DISCHARGE PLAN:    Follow up labs: No labs orders/due    Medical Follow Up:      Follow up with primary care provider in 1 week    MT referral needed and placed by this provider: No    Current [place] scheduled appointments:       Discharge Services: " Home Care:  Occupational Therapy, Physical Therapy, Registered Nurse and Home Health Aide    Discharge Instructions Verbalized to Patient at Discharge:     As above      TOTAL DISCHARGE TIME:   Greater than 30 minutes  Electronically signed by:  NOHEMI Zee CNP       Face to Face and Medical Necessity Statement for DME Provider visit    Demographic Information on Marie Kline:  Gender: female  : 1932  [address]  [place] [id number] [phone] (home)     Medical Record: [id number]  Social Security Number: [id number]  Primary Care Provider: Piper Kiser  Insurance: Payor: MEDICARE / Plan: MEDICARE / Product Type: Medicare /     HPI:   Marie Kline is a 87 year old  (1932), who is being seen today for a face to face provider visit at [facility]; medical necessity statement for DME included. This patient requires the following:  DME Ordered and Medical Necessity Statement   Oxygen: 2 L/min via NC continuous and needs portable tank due to mobility in home environment ; Clinical Documentation: (Obtain documented RA sat with in 2 days of discharge in acute setting or within 30 days of provider visit):  Method 1: Room air at rest: Qualifing sat level is < 88% or below on room air at rest and was 87% at rest on RA on 2019.     Due to desaturation of SAO2 < 88% on room air from COPD, home oxygen therapy will benefit my patient's condition. The patient has tried oral medications with limited success and oxygen is till required The patient is mobile in the home and requires portability.     Pt needing above DME with expected length of need of 99 months  due to medical necessity associated with following diagnosis:       Chronic obstructive pulmonary disease, unspecified COPD type (H)        PMH   has a past medical history of Arthritis, Bladder cancer (H), Bladder cancer (H), Cardiomyopathy (H), CLL (chronic lymphocytic leukemia) (H),  "Congestive heart failure (H) (10/24/2014), COPD (chronic obstructive pulmonary disease) (H), Hypertension, Hypothyroid, Mumps, Myocardial infarction (H) (10/2014), Pulmonary edema cardiac cause (H) (10/08/2014), and Tricuspid valve disorders, 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 She also has no past medical history of Asymptomatic human immunodeficiency virus (HIV) infection status (H), Cerebral palsy (H), Chronic infection, Complication of anesthesia, Congenital renal agenesis and dysgenesis, Goiter, Gout, Hernia, abdominal, History of blood transfusion, History of spinal cord injury, History of thrombophlebitis, Horseshoe kidney, Hydrocephalus, Malignant hyperthermia, Palpitations, Parkinsons disease (H), Sleep apnea, Spider veins, Spina bifida (H), STD (sexually transmitted disease), Tethered cord (H), or Tuberculosis.    ROS:as above    EXAM  Vitals: /73   Pulse 77   Temp 98.4  F (36.9  C)   Resp 18   Ht 1.499 m (4' 11\")   Wt 46.1 kg (101 lb 9.6 oz)   SpO2 91%   BMI 20.52 kg/m   ;BMI= Body mass index is 20.52 kg/m .    Exam: as above     ASSESSMENT/PLAN:  1. Chronic obstructive pulmonary disease, unspecified COPD type (H)        Orders:  1. DME as above    ELECTRONICALLY SIGNED BY PECOS CERTIFIED PROVIDER:  NOHEMI Zee CNP   NPI: [id number]  [facility]  [address] [place] [address], [address]  [place] [address]          Home care Face to Face documentation done in [facility] attached to Home care orders for [facility].                       Sincerely,        NOHEMI Zee CNP    "

## 2019-09-12 NOTE — PATIENT INSTRUCTIONS
Marie Kline  YOB: 1932                                 SSN: [id number]  [facility] MRN#:[id number]  Medical Center Admission Date: 7/24/2019 Discharge Date: 9/12/2019   PHYSICIAN's DISCHARGE SUMMARY / ORDER SHEET  1. Discharge to: [facility]  2. Medications:      Current Outpatient Medications   Medication Sig     ACETAMINOPHEN PO Take 1,000 mg by mouth every 8 hours And every 8 hours as needed     albuterol (PROAIR HFA/PROVENTIL HFA/VENTOLIN HFA) 108 (90 BASE) MCG/ACT Inhaler Inhale 2 puffs into the lungs every 6 hours as needed for wheezing     albuterol (PROVENTIL) (2.5 MG/3ML) 0.083% neb solution Take 2.5 mg by nebulization every 6 hours as needed for shortness of breath / dyspnea or wheezing     aspirin 81 MG EC tablet Take 81 mg by mouth daily     atorvastatin (LIPITOR) 20 MG tablet Take 1 tablet (20 mg) by mouth daily     bisacodyl (DULCOLAX) 5 MG EC tablet Take 1 tablet (5 mg) by mouth daily as needed for constipation     budesonide-formoterol (SYMBICORT) 80-4.5 MCG/ACT Inhaler Inhale 2 puffs into the lungs 2 times daily     calcium carbonate-vitamin D (OS-CARLOS) 500-400 MG-UNIT tablet Take 1 tablet by mouth daily     ferrous sulfate (IRON) 325 (65 FE) MG tablet Take 325 mg by mouth daily (with breakfast)      furosemide (LASIX) 20 MG tablet Take 1 tablet (20 mg) by mouth daily     gabapentin (NEURONTIN) 300 MG capsule Take 300 mg by mouth every 12 hours      Immune Globulin, Human, (OCTAGAM) 5 GM/100ML SOLN 300 mg/kg into the vein every 30 days    Hold this medication while in TCU-resume at discharge from TCU     ipratropium - albuterol 0.5 mg/2.5 mg/3 mL (DUONEB) 0.5-2.5 (3) MG/3ML neb solution Take 1 vial by nebulization 3 times daily     levothyroxine (SYNTHROID/LEVOTHROID) 75 MCG tablet Take 1 tablet (75 mcg) by mouth daily     Lidocaine (LIDOCARE) 4 % Patch Place 2 patches onto the skin every 24 hours To prevent lidocaine toxicity, patient should be patch free for 12 hrs daily.     metoprolol  succinate ER (TOPROL-XL) 25 MG 24 hr tablet Take 1 tablet (25 mg) by mouth daily     senna-docusate (SENOKOT-S/PERICOLACE) 8.6-50 MG tablet Take 2 tablets by mouth 2 times daily as needed for constipation     sertraline (ZOLOFT) 50 MG tablet Take 1 tablet (50 mg) by mouth daily     sodium chloride (OCEAN) 0.65 % nasal spray Spray 1 spray into both nostrils every hour as needed for congestion     traZODone (DESYREL) 50 MG tablet Take 2 tablets (100 mg) by mouth At Bedtime       --see Home Medication List/Physician Order  3. Diet: regular  4. Condition on Discharge: Stable  5. Level of Activity: Up with 4WW as tolerated.   6. Appointments:  Please follow up with your primary physician in next 7 days after discharge.     Please schedule follow up with Dr. Ramey for 4 weeks following your 9/3 appt. [phone].    Please schedule follow up appointments with Dr. Ivory on [facility] - [phone], and with your cardiologist as you have been directed.     7. Referrals: [facility] RN, PT, OT, HHA          _____________________________NOHEMI [facility]   Department of [facility]                9/12/2019

## 2019-09-16 ENCOUNTER — MEDICAL CORRESPONDENCE (OUTPATIENT)
Dept: HEALTH INFORMATION MANAGEMENT | Facility: CLINIC | Age: 84
End: 2019-09-16

## 2019-09-16 ENCOUNTER — PATIENT OUTREACH (OUTPATIENT)
Dept: CARE COORDINATION | Facility: CLINIC | Age: 84
End: 2019-09-16

## 2019-09-16 DIAGNOSIS — F41.9 ANXIETY: ICD-10-CM

## 2019-09-16 NOTE — PROGRESS NOTES
"Clinic Care Coordination Contact    Follow Up Progress Note     Recent Hospitalization/ED:  Hospital  [facility] stay 7/15/19 to 7/24/19.  Date of SNF Admission: July / 24 / 2019 -[facility]  Date of SNF (anticipated) Discharge: September / 14 / 2019  Discharged to: previous assisted living  Cognitive Scores: SLUMS 30/30, CPT 4.5/5.6  Physical Function: SBA for UBD and frequent cues for O2 tubing. Min A for lower body dressing cues for clothing management. Set up with G/H. Ambulating 300 feet Inedep with fWW. CGA with transfers   DME: Home Oxygen     Assessment: RN CC spoke to patient. She is doing well.  States she has \"a lot of help\" at home. No new complaints/needs.    [facility] - RN/PT/OT/HHA  - JEROMY Rosenthal [phone].  Plan to resume care on 9/16/19.    Goals addressed this encounter:   Goals Addressed                 This Visit's Progress      baseline health   On track     Goal Statement: I will get back my independence.  Measure of Success: I will not have any pain.  I will be at baseline activity level.  I will not have any ED/IP admissions related to chronic health conditions.    Supportive Steps to Achieve: Patient to take medications as prescribed.  Patient will follow up with providers as recommended.  Patient will follow home care recommendations.  Patient will be supported by RN CC and community paramedic.  Barriers: back pain, not managed well.  Strengths: Patient is able to advocate for self.  Willing to accept recommendations and feedback.  Date to Achieve By: 1 Nov 2019  Patient expressed understanding of goal: yes.    As of today's date 9/16/2019 goal is met at 26 - 50%.   Goal Status:  Showing progress  Patient has been discharged from TCU.  Patient is now home and receiving home care services.  Mobility is slowly improving.                    Outreach Frequency: monthly    Plan:   Patient to resume care with [facility].  Patient denied " any new needs/concerns.  Patient to f/u with PCP on 10/15.  RN CC will continue to follow.    Care Coordinator will follow delegate to CHW to f/u on goals in 1 month.    Babs Garcia RN  Care Coordination  Phone:  [phone]  Email: [email or web address]  [facility]

## 2019-09-17 ENCOUNTER — TELEPHONE (OUTPATIENT)
Dept: INTERNAL MEDICINE | Facility: CLINIC | Age: 84
End: 2019-09-17

## 2019-09-17 NOTE — TELEPHONE ENCOUNTER
Smiley PINZON with [facility] ([phone]) calling with O2 sat report and to report low BP    BP 90/50 at today's visit,  pulse in 70s.  Patient is aymptomatic, no dizziness or fatigue.  Patient is on Metoprolol 25 mg daily and Lasix 20 mg daily.  She does have +1 edema in feet and ankles bilaterally.  Lungs sound clear but diminished  O2 sat on room air 87% today.  She has oxygen in the home and wears it at night but is not good about using it during the day.  Patient does not appear to be SOB even with O2 sat 87%.  Smiley PINZON tried to convince her to use O2 during the day also but not convinced she will do it.  ZAID Sin R.N.

## 2019-09-17 NOTE — TELEPHONE ENCOUNTER
Spoke with JEROMY Reis.    She did have a reading of 90% briefly.    8/12/19 she had oxygen of 87% at rest on room air.      Agree with oxygen during the day.     Recommend holding the metoprolol until blood pressure improves.     Will try to have home health care visit her tomorrow.

## 2019-09-18 NOTE — TELEPHONE ENCOUNTER
Smiley calls with update after home visit today.   She did call yesterday and told patient to hold Metoprolol, but patient took it this morning.   /52  Pulse 68  O2 on 3L nasal cannula - 91-93%. O2 level does decrease to 87-88% while patient is talking, but improved back to 91-93% when Smiley had patient stop talking.    Lungs sounds are clear today and only a very small edema in ankles-edema is improved from yesterday. Patient is coughing, but states this is caused by her breathing treatment. She is bringing up a small amount of yellow sputum with the cough.     Update forwarded to provider for review.  Please call Smiley back with any orders or recommendations.

## 2019-09-19 ENCOUNTER — TELEPHONE (OUTPATIENT)
Dept: INTERNAL MEDICINE | Facility: CLINIC | Age: 84
End: 2019-09-19

## 2019-09-19 NOTE — TELEPHONE ENCOUNTER
[facility] now requests orders and shares plan of care/discharge summaries for some patients through [employer].  Please REPLY TO THIS MESSAGE OR ROUTE BACK TO THE AUTHOR in order to give authorization for orders when needed.  This is considered a verbal order, you will still receive a faxed copy of orders for signature.  Thank you for your assistance in improving collaboration for our patients.    Pt. Now on O2 following hospitalization and wants to wean from it and can benefit from HC PT    ORDER    PT 2W3 for instruction/progression in HEP for strengthening/balance, gait training and other acts to improve endurance.      Avril Day, PT  [email or web address]  [phone]

## 2019-09-20 NOTE — TELEPHONE ENCOUNTER
"Requested Prescriptions   Pending Prescriptions Disp Refills     sertraline (ZOLOFT) 50 MG tablet 90 tablet 1     Sig: Take 1 tablet (50 mg) by mouth daily       SSRIs Protocol Passed - 9/16/2019  3:58 PM        Passed - Recent (12 mo) or future (30 days) visit within the authorizing provider's specialty     Patient had office visit in the last 12 months or has a visit in the next 30 days with authorizing provider or within the authorizing provider's specialty.  See \"Patient Info\" tab in inbasket, or \"Choose Columns\" in Meds & Orders section of the refill encounter.              Passed - Medication is active on med list        Passed - Patient is age 18 or older        Passed - No active pregnancy on record        Passed - No positive pregnancy test in last 12 months        Medication refused, refill requested too soon. Prescription ordered on 6/26/19 for 90 day supply with 1 additional refill.    "

## 2019-09-23 ENCOUNTER — TELEPHONE (OUTPATIENT)
Dept: INTERNAL MEDICINE | Facility: CLINIC | Age: 84
End: 2019-09-23

## 2019-09-24 ENCOUNTER — TELEPHONE (OUTPATIENT)
Dept: INTERNAL MEDICINE | Facility: CLINIC | Age: 84
End: 2019-09-24

## 2019-09-27 ENCOUNTER — TELEPHONE (OUTPATIENT)
Dept: INTERNAL MEDICINE | Facility: CLINIC | Age: 84
End: 2019-09-27

## 2019-09-30 ENCOUNTER — TELEPHONE (OUTPATIENT)
Dept: INTERNAL MEDICINE | Facility: CLINIC | Age: 84
End: 2019-09-30

## 2019-09-30 ENCOUNTER — TRANSFERRED RECORDS (OUTPATIENT)
Dept: HEALTH INFORMATION MANAGEMENT | Facility: CLINIC | Age: 84
End: 2019-09-30

## 2019-09-30 ENCOUNTER — TELEPHONE (OUTPATIENT)
Dept: PHARMACY | Facility: CLINIC | Age: 84
End: 2019-09-30

## 2019-09-30 DIAGNOSIS — F41.1 GAD (GENERALIZED ANXIETY DISORDER): Primary | ICD-10-CM

## 2019-09-30 NOTE — TELEPHONE ENCOUNTER
[facility]  Certification and POC from 9/6/19 to 11/14/19  Dr. Kiser's in basket   Fax

## 2019-09-30 NOTE — TELEPHONE ENCOUNTER
Requested Prescriptions   Pending Prescriptions Disp Refills     gabapentin (NEURONTIN) 300 MG capsule  Last Written Prescription Date:  historical  Last Fill Quantity: ,  # refills:    Last office visit: 6/25/2019 with prescribing provider:     Future Office Visit:   Next 5 appointments (look out 90 days)    Oct 15, 2019  2:00 PM CDT  Office Visit with Piper Kiser MD  [facility] ([facility] [address]  [facility] [address]  [phone]              Sig: Take 1 capsule (300 mg) by mouth every 12 hours       There is no refill protocol information for this order

## 2019-10-01 DIAGNOSIS — Z53.9 DIAGNOSIS NOT YET DEFINED: Primary | ICD-10-CM

## 2019-10-01 PROCEDURE — 99207 C MD CERTIFICATION HHA PATIENT: CPT | Performed by: INTERNAL MEDICINE

## 2019-10-02 RX ORDER — GABAPENTIN 300 MG/1
300 CAPSULE ORAL EVERY 12 HOURS
Qty: 180 CAPSULE | Refills: 0 | Status: SHIPPED | OUTPATIENT
Start: 2019-10-02 | End: 2019-11-13

## 2019-10-02 NOTE — TELEPHONE ENCOUNTER
Last fill 12-19-18 #180 with 1 refill.    Medication discontinued per Cherise BEST RPH 5-7-19 with reason:  Medication reconciliation clean up.      Please advise, thanks.

## 2019-10-15 ENCOUNTER — OFFICE VISIT (OUTPATIENT)
Dept: PHARMACY | Facility: CLINIC | Age: 84
End: 2019-10-15
Payer: COMMERCIAL

## 2019-10-15 ENCOUNTER — OFFICE VISIT (OUTPATIENT)
Dept: INTERNAL MEDICINE | Facility: CLINIC | Age: 84
End: 2019-10-15
Payer: MEDICARE

## 2019-10-15 VITALS
OXYGEN SATURATION: 90 % | BODY MASS INDEX: 19.9 KG/M2 | TEMPERATURE: 98.2 F | SYSTOLIC BLOOD PRESSURE: 112 MMHG | HEIGHT: 59 IN | HEART RATE: 99 BPM | DIASTOLIC BLOOD PRESSURE: 72 MMHG | WEIGHT: 98.7 LBS | RESPIRATION RATE: 13 BRPM

## 2019-10-15 VITALS — WEIGHT: 98.7 LBS | BODY MASS INDEX: 19.93 KG/M2

## 2019-10-15 DIAGNOSIS — E03.9 ACQUIRED HYPOTHYROIDISM: ICD-10-CM

## 2019-10-15 DIAGNOSIS — F41.1 GAD (GENERALIZED ANXIETY DISORDER): ICD-10-CM

## 2019-10-15 DIAGNOSIS — Z23 NEED FOR PROPHYLACTIC VACCINATION AND INOCULATION AGAINST INFLUENZA: ICD-10-CM

## 2019-10-15 DIAGNOSIS — E03.9 HYPOTHYROIDISM, UNSPECIFIED TYPE: ICD-10-CM

## 2019-10-15 DIAGNOSIS — J44.1 COPD EXACERBATION (H): ICD-10-CM

## 2019-10-15 DIAGNOSIS — I10 ESSENTIAL HYPERTENSION WITH GOAL BLOOD PRESSURE LESS THAN 140/90: ICD-10-CM

## 2019-10-15 DIAGNOSIS — G47.00 INSOMNIA, UNSPECIFIED TYPE: Primary | ICD-10-CM

## 2019-10-15 DIAGNOSIS — R30.0 DYSURIA: ICD-10-CM

## 2019-10-15 DIAGNOSIS — S32.9XXD CLOSED NONDISPLACED FRACTURE OF PELVIS WITH ROUTINE HEALING, UNSPECIFIED PART OF PELVIS, SUBSEQUENT ENCOUNTER: ICD-10-CM

## 2019-10-15 DIAGNOSIS — C91.10 CLL (CHRONIC LYMPHOCYTIC LEUKEMIA) (H): ICD-10-CM

## 2019-10-15 DIAGNOSIS — Z87.81 HISTORY OF LEFT SHOULDER FRACTURE: Primary | ICD-10-CM

## 2019-10-15 LAB
ALBUMIN UR-MCNC: NEGATIVE MG/DL
ANISOCYTOSIS BLD QL SMEAR: SLIGHT
APPEARANCE UR: ABNORMAL
BACTERIA #/AREA URNS HPF: ABNORMAL /HPF
BILIRUB UR QL STRIP: NEGATIVE
COLOR UR AUTO: YELLOW
DIFFERENTIAL METHOD BLD: ABNORMAL
EOSINOPHIL # BLD AUTO: 1 10E9/L (ref 0–0.7)
EOSINOPHIL NFR BLD AUTO: 2 %
ERYTHROCYTE [DISTWIDTH] IN BLOOD BY AUTOMATED COUNT: 17.1 % (ref 10–15)
GLUCOSE UR STRIP-MCNC: NEGATIVE MG/DL
HCT VFR BLD AUTO: 39.4 % (ref 35–47)
HGB BLD-MCNC: 12 G/DL (ref 11.7–15.7)
HGB UR QL STRIP: ABNORMAL
HYALINE CASTS #/AREA URNS LPF: ABNORMAL /LPF
KETONES UR STRIP-MCNC: NEGATIVE MG/DL
LEUKOCYTE ESTERASE UR QL STRIP: ABNORMAL
LYMPHOCYTES # BLD AUTO: 42.3 10E9/L (ref 0.8–5.3)
LYMPHOCYTES NFR BLD AUTO: 86 %
MACROCYTES BLD QL SMEAR: PRESENT
MCH RBC QN AUTO: 27.6 PG (ref 26.5–33)
MCHC RBC AUTO-ENTMCNC: 30.5 G/DL (ref 31.5–36.5)
MCV RBC AUTO: 91 FL (ref 78–100)
MICROCYTES BLD QL SMEAR: PRESENT
MONOCYTES # BLD AUTO: 0.5 10E9/L (ref 0–1.3)
MONOCYTES NFR BLD AUTO: 1 %
NEUTROPHILS # BLD AUTO: 5.4 10E9/L (ref 1.6–8.3)
NEUTROPHILS NFR BLD AUTO: 11 %
NITRATE UR QL: NEGATIVE
NON-SQ EPI CELLS #/AREA URNS LPF: ABNORMAL /LPF
OVALOCYTES BLD QL SMEAR: SLIGHT
PH UR STRIP: 5.5 PH (ref 5–7)
PLATELET # BLD AUTO: 189 10E9/L (ref 150–450)
PLATELET # BLD EST: ABNORMAL 10*3/UL
RBC # BLD AUTO: 4.35 10E12/L (ref 3.8–5.2)
RBC #/AREA URNS AUTO: ABNORMAL /HPF
SOURCE: ABNORMAL
SP GR UR STRIP: 1.01 (ref 1–1.03)
UROBILINOGEN UR STRIP-ACNC: 0.2 EU/DL (ref 0.2–1)
WBC # BLD AUTO: 49.2 10E9/L (ref 4–11)
WBC #/AREA URNS AUTO: ABNORMAL /HPF

## 2019-10-15 PROCEDURE — 84443 ASSAY THYROID STIM HORMONE: CPT | Performed by: INTERNAL MEDICINE

## 2019-10-15 PROCEDURE — 99607 MTMS BY PHARM ADDL 15 MIN: CPT | Performed by: PHARMACIST

## 2019-10-15 PROCEDURE — 81001 URINALYSIS AUTO W/SCOPE: CPT | Performed by: INTERNAL MEDICINE

## 2019-10-15 PROCEDURE — 80048 BASIC METABOLIC PNL TOTAL CA: CPT | Performed by: INTERNAL MEDICINE

## 2019-10-15 PROCEDURE — 87186 SC STD MICRODIL/AGAR DIL: CPT | Performed by: INTERNAL MEDICINE

## 2019-10-15 PROCEDURE — 87086 URINE CULTURE/COLONY COUNT: CPT | Performed by: INTERNAL MEDICINE

## 2019-10-15 PROCEDURE — 90662 IIV NO PRSV INCREASED AG IM: CPT | Performed by: INTERNAL MEDICINE

## 2019-10-15 PROCEDURE — 99606 MTMS BY PHARM EST 15 MIN: CPT | Performed by: PHARMACIST

## 2019-10-15 PROCEDURE — 85025 COMPLETE CBC W/AUTO DIFF WBC: CPT | Performed by: INTERNAL MEDICINE

## 2019-10-15 PROCEDURE — 99214 OFFICE O/P EST MOD 30 MIN: CPT | Mod: 25 | Performed by: INTERNAL MEDICINE

## 2019-10-15 PROCEDURE — G0008 ADMIN INFLUENZA VIRUS VAC: HCPCS | Performed by: INTERNAL MEDICINE

## 2019-10-15 PROCEDURE — 87088 URINE BACTERIA CULTURE: CPT | Performed by: INTERNAL MEDICINE

## 2019-10-15 PROCEDURE — 36415 COLL VENOUS BLD VENIPUNCTURE: CPT | Performed by: INTERNAL MEDICINE

## 2019-10-15 RX ORDER — SULFAMETHOXAZOLE/TRIMETHOPRIM 800-160 MG
1 TABLET ORAL 2 TIMES DAILY
Qty: 20 TABLET | Refills: 0 | Status: SHIPPED | OUTPATIENT
Start: 2019-10-15 | End: 2019-11-13

## 2019-10-15 RX ORDER — BUDESONIDE AND FORMOTEROL FUMARATE DIHYDRATE 80; 4.5 UG/1; UG/1
2 AEROSOL RESPIRATORY (INHALATION) 2 TIMES DAILY
Qty: 1 INHALER | Refills: 0 | Status: SHIPPED | OUTPATIENT
Start: 2019-10-15 | End: 2019-11-25

## 2019-10-15 ASSESSMENT — MIFFLIN-ST. JEOR: SCORE: 788.33

## 2019-10-15 NOTE — PROGRESS NOTES
"Subjective     Marie Kline is a 87 year old female who presents to clinic today for the following health issues:  Patient is present with her daugher.   [facility]       Hospital Follow-up Visit:    Hospital/Nursing Home/IP Rehab Facility: [facility] and [facility]  Date of Admission: 7/24/19  Date of Discharge: 9/24/19  Reason(s) for Admission: fall   \"  1. Fall: unclear mechanism, patient denies any dizziness/syncope but is unable to tell me the sequence of events-just that she landed on the steps.    2. Left shoulder fracture, multiple pelvic fractures. Conservative management per ortho Pain control will need placement.  3. Pain control:  Definitely better than a few days ago per discussion with dtr and pain team.  Currently on ox 2.5 q6 hours but still with some breakthrough pain.  Apparently has had reactions to hydroxyzine in the past.  Has e/o of constipation as well.  Discussed with pain team-plan:  Increase sched oxy to 2.5 mg qid, cont apap 1 gm tid, aggressive bowel regimen-will need placement  4. Hematoma: Stable hemoglobin.  But with mass-effect and urinary bladder.  Has a Rees catheter in place.  5. Closed head injury with scalp hematoma-amnestic for some of the events surrounding the fall.  \"     Left shoulder is improving.  She is doing physical therapy by herself from what she learned in the one month of Physical therapy/OT.     Today she would like to be assessed for a UTI.   She has had dysuria and hesitancy for one week.  No fevers or chills.  No abdominal pain or back pain.             Problems taking medications regularly:  None       Medication changes since discharge: None       Problems adhering to non-medication therapy:  None    Summary of hospitalization:  [facility] discharge summary reviewed  Diagnostic Tests/Treatments reviewed.  Follow up needed: none  Other Healthcare Providers Involved in Patient s Care:         None  Update since discharge: " improved.     Post Discharge Medication Reconciliation: discharge medications reconciled, continue medications without change.  Plan of care communicated with patient and family     Coding guidelines for this visit:  Type of Medical   Decision Making Face-to-Face Visit       within 7 Days of discharge Face-to-Face Visit        within 14 days of discharge   Moderate Complexity 42990 [id number]   High Complexity [id number]            PROBLEMS TO ADD ON...    Patient Active Problem List   Diagnosis     Acute and chronic respiratory failure with hypercapnia (HCC)     Nonischemic cardiomyopathy (H)     Pneumonia     Acute myocardial infarction, initial episode of care (HCC)     CLL (chronic lymphocytic leukemia) (HCC)     CHF (congestive heart failure) (HCC)     Cardiomyopathy in other diseases classified elsewhere (HCC)     Hyperlipidemia with target LDL less than 130     Health Care Home     COPD exacerbation (H)     LESLY (generalized anxiety disorder)     Hypothyroidism     Back pain with radiation     DDD (degenerative disc disease), lumbar     Splenomegaly     Lumbar degenerative disc disease     Lumbar foraminal stenosis     Central spinal stenosis     Bladder cancer (H)     Sepsis (H)     Senile osteoporosis     CKD (chronic kidney disease) stage 3, GFR 30-59 ml/min (H)     Purpura senilis (H)     Xerosis cutis     Malignant neoplasm of urinary bladder, unspecified site (H)     Hypoxia     Femoral neck fracture (H)     S/P total hip arthroplasty     NSTEMI (non-ST elevated myocardial infarction) (H)     Stress-induced cardiomyopathy     COPD with acute exacerbation (H)     Shoulder fracture     Mouth sores     Closed nondisplaced fracture of pelvis with routine healing, unspecified part of pelvis, subsequent encounter     Pelvic hematoma in female     Closed head injury, subsequent encounter     Multiple skin tears     Pneumonia of left lung due to infectious organism, unspecified part of lung     Chest pain      Coronary artery disease involving native heart with angina pectoris, unspecified vessel or lesion type (H)     Past Surgical History:   Procedure Laterality Date     BIOPSY LYMPH NODE INGUINAL Right 10/23/2018    Procedure: excsional biopsy right inguinal lymph node;  Surgeon: Reji Connors MD;  Location: [facility] OR     BLADDER SURGERY       CORONARY ANGIOGRAPHY ADULT ORDER  10/2014    minimal CAD     CV LEFT HEART CATH N/A 2018    Procedure: Left Heart Cath;  Surgeon: Sadiq Carrasco MD;  Location:  HEART CARDIAC CATH LAB     CYSTOSCOPY       CYSTOSCOPY, BIOPSY BLADDER, COMBINED N/A 2016    Procedure: COMBINED CYSTOSCOPY, BIOPSY BLADDER;  Surgeon: Kar Nuñez MD;  Location:  OR     CYSTOSCOPY, TRANSURETHRAL RESECTION (TUR) TUMOR BLADDER, COMBINED N/A 2016    Procedure: COMBINED CYSTOSCOPY, TRANSURETHRAL RESECTION (TUR) TUMOR BLADDER;  Surgeon: Kar Nuñez MD;  Location:  OR     ESOPHAGOSCOPY, GASTROSCOPY, DUODENOSCOPY (EGD), COMBINED N/A 2016    Procedure: COMBINED ESOPHAGOSCOPY, GASTROSCOPY, DUODENOSCOPY (EGD);  Surgeon: Freddie Diez MD;  Location:  GI     OPEN REDUCTION INTERNAL FIXATION HIP BIPOLAR Left 2018    Procedure: Left bipolar hemiarthroplasty;  Surgeon: Scar Reynolds MD;  Location:  OR       Social History     Tobacco Use     Smoking status: Former Smoker     Types: Cigarettes     Last attempt to quit: 2/3/1996     Years since quittin.7     Smokeless tobacco: Never Used   Substance Use Topics     Alcohol use: No     Alcohol/week: 0.0 standard drinks     Family History   Problem Relation Age of Onset     Cerebrovascular Disease Mother      Cancer Father            Reviewed and updated as needed this visit by Provider         Review of Systems   ROS COMP: CONSTITUTIONAL: NEGATIVE for fever, chills, change in weight  ENT/MOUTH: NEGATIVE for ear, mouth and throat problems  RESP: NEGATIVE for significant cough or SOB  CV:  "NEGATIVE for chest pain, palpitations or peripheral edema      Objective    /72   Pulse 99   Temp 98.2  F (36.8  C) (Oral)   Resp 13   Ht 1.499 m (4' 11\")   Wt 44.8 kg (98 lb 11.2 oz)   SpO2 90%   BMI 19.93 kg/m    Body mass index is 19.93 kg/m .  Physical Exam   GENERAL: cachectic, alert and no distress  RESP: lungs clear to auscultation - no rales, rhonchi or wheezes  CV: regular rate and rhythm, normal S1 S2, no S3 or S4, no murmur, click or rub, no peripheral edema and peripheral pulses strong  ABDOMEN: soft, nontender, no hepatosplenomegaly, no masses and bowel sounds normal  MS:difficulty raising arms bilaterally    Diagnostic Test Results:  Labs reviewed in Epic        Assessment & Plan     (Z87.81) History of left shoulder fracture  (primary encounter diagnosis)  Comment:   Plan: continue with physical therapy    (J44.1) COPD exacerbation (H)  Comment: needs symbicort  Plan: budesonide-formoterol (SYMBICORT) 80-4.5         MCG/ACT Inhaler            (E03.9) Acquired hypothyroidism  Comment: assess  Plan: TSH with free T4 reflex            (R30.0) Dysuria  Comment: assess or UTI  Plan: *UA reflex to Microscopic and Culture ([place]         and [facility] (except [place] and         [place])                Return in about 3 months (around 1/15/2020).    Piper Kiser MD  [facility]    "

## 2019-10-15 NOTE — PROGRESS NOTES
SUBJECTIVE/OBJECTIVE:                Marie Kline is a 86 year old female called for a follow-up MTM visit.  She was referred by Dr. Kiser. Her daughter joined us today.    Chief Complaint: Last MTM on 6/4/19.        Allergies/ADRs: Reviewed in Epic  Tobacco: No tobacco use  Alcohol: not currently using  Caffeine: decaff coffee   PMH: Reviewed in Epic - CLL (f/u with [facility])    Medication Adherence: sets up own pill box, she brought all her bottles and inhalers today    Shoulder/Pelvis fracture:  Discharged from NH on 9/12/19 after two month stay.  Was in a sling for 7 weeks.  Did not have surgery.  Now reports weakness in her left arm.  Taking gabapentin 300 mg BID, APAP 1000 mg every 8 hours as needed .    Insomnia: Current medications include: trazodone 100 mg every night.  Reports sleep has been much better.  No morning grogginess or side effects.     Anxiety:  Current medications include: Sertraline 50 mg once daily. Pt reports that depression symptoms are improved.  No flowsheet data found.      COPD: Current medications: Albuterol MDI as needed (not needed daily), Proventil BID (she has been using this has her control by mistake), albuterol Nebs every morning (helps with congestion).    Was prescribed Symbicort 2 puffs BID (gets through prescription assistance program, doesn't believe she has had this since being home).   Using oxygen and wearing during the appointment.  Pt is not experiencing side effects.   Pt reports the following symptoms: none at this time  Pt does not have an COPD Action Plan on file.   Has spirometry been completed: Yes   Followed by Dr. Courtney.    Hypothyroidism: Patient is taking levothyroxine 75 mcg daily. Patient is having the following symptoms: none.   TSH   Date Value Ref Range Status   05/01/2019 0.46 0.40 - 4.00 mU/L Final       Hypertension: Current medications include metoprolol XL 25 mg daily and furosemide 20 mg daily.  Patient does not self-monitor BP.  Patient  reports no current medication side effects.      Current labs include:  BP Readings from Last 3 Encounters:   10/15/19 112/72   09/12/19 121/73   09/10/19 132/65       Wt 98 lb 11.2 oz (44.8 kg)   BMI 19.93 kg/m        GFR Estimate   Date Value Ref Range Status   08/16/2019 79 >60 mL/min/[1.73_m2] Final     Comment:     Non  GFR Calc  Starting 12/18/2018, serum creatinine based estimated GFR (eGFR) will be   calculated using the Chronic Kidney Disease Epidemiology Collaboration   (CKD-EPI) equation.     08/05/2019 82 >60 mL/min/[1.73_m2] Final     Comment:     Non  GFR Calc  Starting 12/18/2018, serum creatinine based estimated GFR (eGFR) will be   calculated using the Chronic Kidney Disease Epidemiology Collaboration   (CKD-EPI) equation.     07/29/2019 82 >60 mL/min/[1.73_m2] Final     Comment:     Non  GFR Calc  Starting 12/18/2018, serum creatinine based estimated GFR (eGFR) will be   calculated using the Chronic Kidney Disease Epidemiology Collaboration   (CKD-EPI) equation.       Estimated Creatinine Clearance: 43.7 mL/min (based on SCr of 0.66 mg/dL).    Most Recent Immunizations   Administered Date(s) Administered     HepA-Adult 04/08/2009     HepB-Adult 04/08/2009     Influenza (High Dose) 3 valent vaccine 09/21/2017     Influenza (IIV3) PF 09/24/2014     Influenza (intradermal) 09/24/2013     Mantoux Tuberculin Skin Test 02/22/2016     Pneumo Conj 13-V (2010&after) 09/22/2015     Pneumococcal 23 valent 09/24/2013     Tdap (Adacel,Boostrix) 07/10/2014     Tdap (Adult) Unspecified Formulation 07/10/2014       ASSESSMENT:                             Current medications were reviewed today as above.   Post Discharge Medication Reconciliation Status: discharge medications reconciled and changed, per note/orders (see AVS).      Medication Adherence:  issues identified    Shoulder/Pelvis fracture:  Improved.  Continue current regimen.    Insomnia:  stable    Anxiety: stable    COPD: contacted prescription assistance, unable to get Symbicort covered for this year.  Discussed with Dr. Kiser and she will send refill to pharmacy.   Reviewed rescue vs controller with patient today.    Hypothyroidism: stable    Hypertension: satble    PLAN:                            1.  Symbicort refilled today- will reach out to [facility] Prescription Assistance Program about refils  2.  Proventil is albuterol and should be taken as needed      I spent 30 minutes with this patient today. I offer these suggestions for consideration by cc'd chart to the PCP. A copy of the visit note was provided to the patient's primary care provider.    Will follow up in 1 month      Chandler Perdue D  [phone] (phone)  [phone] (pager)  Medication Therapy Management Pharmacist

## 2019-10-16 LAB
ANION GAP SERPL CALCULATED.3IONS-SCNC: 8 MMOL/L (ref 3–14)
BUN SERPL-MCNC: 9 MG/DL (ref 7–30)
CALCIUM SERPL-MCNC: 8.8 MG/DL (ref 8.5–10.1)
CHLORIDE SERPL-SCNC: 101 MMOL/L (ref 94–109)
CO2 SERPL-SCNC: 28 MMOL/L (ref 20–32)
CREAT SERPL-MCNC: 0.9 MG/DL (ref 0.52–1.04)
GFR SERPL CREATININE-BSD FRML MDRD: 57 ML/MIN/{1.73_M2}
GLUCOSE SERPL-MCNC: 115 MG/DL (ref 70–99)
POTASSIUM SERPL-SCNC: 4.2 MMOL/L (ref 3.4–5.3)
SODIUM SERPL-SCNC: 137 MMOL/L (ref 133–144)
TSH SERPL DL<=0.005 MIU/L-ACNC: 0.45 MU/L (ref 0.4–4)

## 2019-10-17 ENCOUNTER — PATIENT OUTREACH (OUTPATIENT)
Dept: CARE COORDINATION | Facility: CLINIC | Age: 84
End: 2019-10-17

## 2019-10-17 LAB
BACTERIA SPEC CULT: ABNORMAL
SPECIMEN SOURCE: ABNORMAL

## 2019-10-17 NOTE — PROGRESS NOTES
Clinic Care Coordination Contact      Received VM from patient, sounded very upset.  Patient stated she was trying to get oxygen tanks delivered.  She is running out of O2.  She called O2 supplier, [facility] and was told they do not deliver to her area until next month.  She would need to bring empty tanks to showroom to have tanks swapped out.  Patient does not drive and has to rely on friends and family.      RN CC was shocked to hear this.  Called patient and spoke to Marie.  Marie managed to get a ride to swap out O2.  Marie goes on to explain that [facility] was suppose to supply her with a concentrator but this has not been delivered either.  RN CC explained this doesn't make sense and is typically not standard practice.  With Marie's permission  JEROMY CH [facility], there was no answer.  Email sent to [email or web address] to get clarification on their standard of practice.      Patient updated.  Awaiting response from [facility].    Babs Garcia RN  Care Coordination  Phone:  [phone]  Email: [email or web address]  [facility], [place] and [facility]      
Clinic Care Coordination Contact    Follow Up Progress Note/[facility]     Assessment: RN CC received notification of patient's discharge from home care services. Attempted x1 to speak to patient, left  requesting return call.    [facility] and Home care advisor charting reviewed.  Patient had pcp f/u on 10/15 and is being treated for UTI.  Patient now wears O2 continuously 1/5-2L NC.  She is using walker.  C/o of L arm weakness due to being in sling, but reports doing exercises physical therapy had left for her. She likes to attend chap a few times per week, this brings her karlee.     Daughter lives in [place] and is very supportive/invovled.  She provides all transport needs and does grocery shopping.    Goals addressed this encounter:   Goals Addressed                 This Visit's Progress      baseline health        Goal Statement: I will get back my independence.  Measure of Success: I will not have any pain.  I will be at baseline activity level.  I will not have any ED/IP admissions related to chronic health conditions.    Supportive Steps to Achieve: Patient to take medications as prescribed.  Patient will follow up with providers as recommended.  Patient will follow home care recommendations.  Patient will be supported by RN CC and community paramedic.  Barriers: back pain, not managed well.  Strengths: Patient is able to advocate for self.  Willing to accept recommendations and feedback.  Date to Achieve By: 1 dec 2019  Patient expressed understanding of goal: yes.    As of today's date 10/17/2019 goal is met at 51 - 75%.   Goal Status:  Ongoing  Patient was discharged from home care services on 10/14.  Patient had pcp f/u on 10/15.  Patient now wearing chronic O2.                     Plan:   RN CC will attempt to outreach again in 1-2 weeks for follow up    Babs Garcia RN  Care Coordination  Phone:  [phone]  Email: [email or web address]  [facility], [place] and [facility]      
normal...

## 2019-10-21 ENCOUNTER — PATIENT OUTREACH (OUTPATIENT)
Dept: CARE COORDINATION | Facility: CLINIC | Age: 84
End: 2019-10-21

## 2019-10-21 DIAGNOSIS — F51.01 PRIMARY INSOMNIA: ICD-10-CM

## 2019-10-21 RX ORDER — TRAZODONE HYDROCHLORIDE 50 MG/1
100 TABLET, FILM COATED ORAL AT BEDTIME
Qty: 90 TABLET | Refills: 0 | Status: SHIPPED | OUTPATIENT
Start: 2019-10-21 | End: 2019-11-13

## 2019-10-21 NOTE — TELEPHONE ENCOUNTER
"Requested Prescriptions   Pending Prescriptions Disp Refills     traZODone (DESYREL) 50 MG tablet [Pharmacy Med Name: traZODone HCl Oral Tablet 50 MG]  Last Written Prescription Date:  7/9/2019  Last Fill Quantity: 90,  # refills: 0   Last office visit: 10/15/2019 with prescribing provider:     Future Office Visit:   90 tablet 0     Sig: Take 2 tablets (100 mg) by mouth At Bedtime       Serotonin Modulators Passed - 10/21/2019 11:06 AM        Passed - Recent (12 mo) or future (30 days) visit within the authorizing provider's specialty     Patient has had an office visit with the authorizing provider or a provider within the authorizing providers department within the previous 12 mos or has a future within next 30 days. See \"Patient Info\" tab in inbasket, or \"Choose Columns\" in Meds & Orders section of the refill encounter.              Passed - Medication is active on med list        Passed - Patient is age 18 or older        Passed - No active pregnancy on record        Passed - No positive pregnancy test in past 12 months        "

## 2019-10-21 NOTE — PROGRESS NOTES
On 10/21/19 at 10:06AM patient had left a voicemail stating that she was running out of her sleeping pill and would like a refill sent to the [facility] in [place].    CHW returned patients call, informed her that it looked like someone had already sent in the refill to Dr Kiser.  Patient would like to know if this Rx is refilled.  Please contact patient at your earliest convenience.

## 2019-10-22 ENCOUNTER — PATIENT OUTREACH (OUTPATIENT)
Dept: CARE COORDINATION | Facility: CLINIC | Age: 84
End: 2019-10-22

## 2019-10-22 RX ORDER — TRAZODONE HYDROCHLORIDE 50 MG/1
100 TABLET, FILM COATED ORAL AT BEDTIME
Qty: 90 TABLET | Refills: 0 | OUTPATIENT
Start: 2019-10-22

## 2019-10-22 NOTE — PROGRESS NOTES
Attempted to contact patient to inform her prescription was sent. Left voice message to call back.

## 2019-10-22 NOTE — PROGRESS NOTES
Community Health Worker called the patient for Clinic Care Coordination outreach follow up on goals and action steps.  Spoke to Marie    Discussed the following  Getting back independence    Marie states that she hasn't been feeling any pain lately.    However, she would like to work on not having to rely on her oxygen tank if that's possible.    Patient reports  Marie did receive a call this morning that her Rx's she had called about yesterday are available for her to .    She states that lately she hasn't been feeling herself, she's been a little dizzier then normal.  Sometimes when she turns around too quickly she'll lose her balance a little and gets dizzy spells.  She stated that this started happening when when her dose frequencies raised on her medications.  She's wondering if this is possibly causing this.    Plan  CHW will send a message to DIMA Mejia RN, regarding her concerns.    _____________________  CHW delegation from DIMA Mejia RN, on 09/16/19:  Can you pls f/u with goals in 1 month.  Thanks.     10/22/19: See notes above  Delegation completed  ______________________  Next Outreach: 11/21/19  Planned Outreach Frequency: Monthly  Preferred Phone Number: [phone]    Last Assessment Date: 05/16/19  Care Plan Completion Date: 05/16/19  ______________________  Goals       baseline health      Goal Statement: I will get back my independence.  Measure of Success: I will not have any pain.  I will be at baseline activity level.  I will not have any ED/IP admissions related to chronic health conditions.    Supportive Steps to Achieve: Patient to take medications as prescribed.  Patient will follow up with providers as recommended.  Patient will follow home care recommendations.  Patient will be supported by RN CC and community paramedic.  Barriers: back pain, not managed well.  Strengths: Patient is able to advocate for self.  Willing to accept recommendations and feedback.  Date to  Achieve By: 1 dec 2019  Patient expressed understanding of goal: yes.    As of today's date 10/22/2019 goal is met at 51 - 75%.   Goal Status:  Ongoing  Patient doesn't feel any pain anymore, however, wants to work on not having to rely on her oxygen anymore.    As of today's date 10/17/2019 goal is met at 51 - 75%.   Goal Status:  Ongoing  Patient was discharged from home care services on 10/14.  Patient had pcp f/u on 10/15.  Patient now wearing chronic O2.

## 2019-10-23 ENCOUNTER — PATIENT OUTREACH (OUTPATIENT)
Dept: CARE COORDINATION | Facility: CLINIC | Age: 84
End: 2019-10-23

## 2019-10-23 DIAGNOSIS — J44.1 COPD EXACERBATION (H): Primary | ICD-10-CM

## 2019-10-23 NOTE — PROGRESS NOTES
Clinic Care Coordination Contact  Care Team Conversations    Received call from CAROL Guy -[phone].      Malena confirmed patient has requested Portable oxygen concentrator (POC) be ordered for her.  Malena needs an addendum to home oxygen order/letter stating need for POC. Please fax this addendum to [facility].    Will forward to PCP and her MA.    Babs Garcia RN  Care Coordination  Phone:  [phone]  Email: [email or web address] [email or web address]

## 2019-10-23 NOTE — PROGRESS NOTES
"Clinic Care Coordination Contact    Follow Up Progress Note      Assessment: Returned VM from patient.  Marie stated she was concerned about increased tremors in her hands and some intermittent dizziness.  Marie states Sx are new and wants to know if could be SE of medications.  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  Marie did recently meet with MTM in clinic and medications were reviewed at that time.  Rn CC will message MTM to f/u with patient.    RN CC followed up on Home O2 concerns.  Called and spoke to Malena at [facility].  Per Malena, patient has 15 portable oxygen tanks delivered 1 x per month.  Patient has a home concentrator and 25 foot tubing.  Patient had originally declined the portable oxygen concentrator per Malena.  RN CC explained patient is running out of portable tanks quickly because each tank lasts 1.5 hrs.  Patient is very social and out of apartment regularly so is running out of portable O2 quickly.  Malena stated patient can call and request portable concentrator.    Called Marie and explained I had spoken to Malena.  Marie does not remenber being offered a portable concentrator.  Marie called Malena and requested POC be ordered for her.  Malena explained process could take a few weeks.  Marie frustrated at how long it will take, but happy POC has been ordered.    Goals addressed this encounter:   Goals Addressed                 This Visit's Progress      baseline health   On track     Goal Statement: I will get back my independence.  Measure of Success: I will not have any pain.  I will be at baseline activity level.  I will not have any ED/IP admissions related to chronic health conditions.    Supportive Steps to Achieve: Patient to take medications as prescribed.  Patient will follow up with providers as recommended.  Patient will follow home care recommendations.  Patient will be supported by RN CC and community " paramedic.  Barriers: back pain, not managed well.  Strengths: Patient is able to advocate for self.  Willing to accept recommendations and feedback.  Date to Achieve By: 1 dec 2019  Patient expressed understanding of goal: yes.    As of today's date 10/22/2019 goal is met at 51 - 75%.   Goal Status:  Ongoing  Patient doesn't feel any pain anymore, however, wants to work on not having to rely on her oxygen anymore.    As of today's date 10/17/2019 goal is met at 51 - 75%.   Goal Status:  Ongoing  Patient was discharged from home care services on 10/14.  Patient had pcp f/u on 10/15.  Patient now wearing chronic O2.                paint (pt-stated)   On track     Goal Statement: I will be able to paint my birds again!  Date goal set: 10/23/19   Measure of Success: Patient will have decreased tremors that prohibit her from painting.  Barriers: Tremors/dizziness  Strengths: positive attitude.  Good support system.  Social. Caty.  Date to Achieve By: 1 Dec 2019   Patient expressed understanding of goal: yes    Action steps to achieve this goal  1. I will speak to MTM about SE of medications   2. I will f/u with provider as recommended.  3. I will stay active and social.             Intervention/Education provided during outreach: home oxygen- portable oxygen concentrator.     Outreach Frequency: monthly    Plan:   MTM messaged about sx of dizziness and tremors.  Req f/u with patient.  Patient will continue to take medications as prescribed until told otherwise by MTM or PCP.  Patient will f/u with providers as recommended.  Patient will continue to wear home O2 as prescribed.    Care Coordinator will follow up in 1.5 months    Babs Garcia RN  Care Coordination  Phone:  [phone]  Email: [email or web address]  [facility], [place] and [facility]

## 2019-11-07 NOTE — PROGRESS NOTES
Not sure if we need to write oxygen % on the script to prove its less than 88%.     Will need to print off and have colleague sign, as I am out of office.  thanks

## 2019-11-08 ENCOUNTER — TELEPHONE (OUTPATIENT)
Dept: INTERNAL MEDICINE | Facility: CLINIC | Age: 84
End: 2019-11-08

## 2019-11-08 NOTE — TELEPHONE ENCOUNTER
Spoke to pt and let her know that we will send in DME for oxygen concentrator-she would like it to go to [facility]. I also scheduled her tentatively for next Wednesday 11/13/19 with PCP with the understanding that this might change as her schedule is on hold.

## 2019-11-12 ENCOUNTER — OFFICE VISIT (OUTPATIENT)
Dept: UROLOGY | Facility: CLINIC | Age: 84
End: 2019-11-12
Payer: MEDICARE

## 2019-11-12 VITALS
SYSTOLIC BLOOD PRESSURE: 110 MMHG | HEIGHT: 59 IN | BODY MASS INDEX: 19.96 KG/M2 | HEART RATE: 78 BPM | WEIGHT: 99 LBS | DIASTOLIC BLOOD PRESSURE: 68 MMHG

## 2019-11-12 DIAGNOSIS — R31.9 HEMATURIA: ICD-10-CM

## 2019-11-12 DIAGNOSIS — Z79.2 PROPHYLACTIC ANTIBIOTIC: ICD-10-CM

## 2019-11-12 DIAGNOSIS — C67.4 MALIGNANT NEOPLASM OF POSTERIOR WALL OF URINARY BLADDER (H): Primary | ICD-10-CM

## 2019-11-12 PROCEDURE — 99212 OFFICE O/P EST SF 10 MIN: CPT | Mod: 25 | Performed by: UROLOGY

## 2019-11-12 PROCEDURE — 52000 CYSTOURETHROSCOPY: CPT | Performed by: UROLOGY

## 2019-11-12 RX ORDER — CIPROFLOXACIN 500 MG/1
500 TABLET, FILM COATED ORAL ONCE
Qty: 1 TABLET | Refills: 0 | Status: SHIPPED | OUTPATIENT
Start: 2019-11-12 | End: 2019-11-13

## 2019-11-12 ASSESSMENT — PAIN SCALES - GENERAL: PAINLEVEL: NO PAIN (0)

## 2019-11-12 ASSESSMENT — MIFFLIN-ST. JEOR: SCORE: 789.69

## 2019-11-12 NOTE — PATIENT INSTRUCTIONS

## 2019-11-12 NOTE — LETTER
11/12/2019       RE: Marie Kline  [address]  [place] [address]     Dear Colleague,    Thank you for referring your patient, Marie Kline, to the [facility] [place] at [facility]. Please see a copy of my visit note below.    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  She was last seen in March.  At this summer she fell and broke several bones.  She has had no dysuria or hematuria  She was unable to leave a urinalysis for exam today.  Other past medical history, medications and allergies reviewed  Exam: Alert and oriented, normal external genitalia and urethral meatus  Flexible cystoscopy-normal urethra, no papillary tumors in bladder, normal ureteral orifices.  Diffuse erythema that is not raised.  Assessment: Bladder erythema- patient will stop tomorrow for urinalysis, urine culture and urine FISH test.  Call with results.  If culture is positive, treat according to sensitivities.  If FISH test is negative, see me in 6 months for office cystoscopy plan: As above.  Antibiotic x1 today      Again, thank you for allowing me to participate in the care of your patient.      Sincerely,    Kar Nuñez MD

## 2019-11-12 NOTE — NURSING NOTE

## 2019-11-12 NOTE — PROGRESS NOTES
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  She was last seen in March.  At this summer she fell and broke several bones.  She has had no dysuria or hematuria  She was unable to leave a urinalysis for exam today.  Other past medical history, medications and allergies reviewed  Exam: Alert and oriented, normal external genitalia and urethral meatus  Flexible cystoscopy-normal urethra, no papillary tumors in bladder, normal ureteral orifices.  Diffuse erythema that is not raised.  Assessment: Bladder erythema- patient will stop tomorrow for urinalysis, urine culture and urine FISH test.  Call with results.  If culture is positive, treat according to sensitivities.  If FISH test is negative, see me in 6 months for office cystoscopy plan: As above.  Antibiotic x1 today

## 2019-11-13 ENCOUNTER — OFFICE VISIT (OUTPATIENT)
Dept: INTERNAL MEDICINE | Facility: CLINIC | Age: 84
End: 2019-11-13
Payer: MEDICARE

## 2019-11-13 VITALS
HEIGHT: 59 IN | OXYGEN SATURATION: 92 % | WEIGHT: 100.5 LBS | TEMPERATURE: 98.4 F | HEART RATE: 67 BPM | SYSTOLIC BLOOD PRESSURE: 104 MMHG | RESPIRATION RATE: 16 BRPM | DIASTOLIC BLOOD PRESSURE: 72 MMHG | BODY MASS INDEX: 20.26 KG/M2

## 2019-11-13 DIAGNOSIS — F41.9 ANXIETY: ICD-10-CM

## 2019-11-13 DIAGNOSIS — C67.4 MALIGNANT NEOPLASM OF POSTERIOR WALL OF URINARY BLADDER (H): ICD-10-CM

## 2019-11-13 DIAGNOSIS — I50.9 CHRONIC CONGESTIVE HEART FAILURE, UNSPECIFIED HEART FAILURE TYPE (H): Primary | ICD-10-CM

## 2019-11-13 DIAGNOSIS — C67.9 MALIGNANT NEOPLASM OF URINARY BLADDER, UNSPECIFIED SITE (H): ICD-10-CM

## 2019-11-13 DIAGNOSIS — F41.1 GAD (GENERALIZED ANXIETY DISORDER): ICD-10-CM

## 2019-11-13 DIAGNOSIS — R31.9 HEMATURIA: ICD-10-CM

## 2019-11-13 DIAGNOSIS — C91.10 CLL (CHRONIC LYMPHOCYTIC LEUKEMIA) (H): ICD-10-CM

## 2019-11-13 DIAGNOSIS — R41.3 MEMORY LOSS: ICD-10-CM

## 2019-11-13 DIAGNOSIS — F51.01 PRIMARY INSOMNIA: ICD-10-CM

## 2019-11-13 DIAGNOSIS — J44.1 COPD WITH ACUTE EXACERBATION (H): ICD-10-CM

## 2019-11-13 LAB
ALBUMIN UR-MCNC: NEGATIVE MG/DL
APPEARANCE UR: CLEAR
BILIRUB UR QL STRIP: NEGATIVE
COLOR UR AUTO: YELLOW
GLUCOSE UR STRIP-MCNC: NEGATIVE MG/DL
HGB UR QL STRIP: NEGATIVE
KETONES UR STRIP-MCNC: NEGATIVE MG/DL
LEUKOCYTE ESTERASE UR QL STRIP: ABNORMAL
NITRATE UR QL: NEGATIVE
PH UR STRIP: 6 PH (ref 5–7)
SOURCE: ABNORMAL
SP GR UR STRIP: 1.01 (ref 1–1.03)
UROBILINOGEN UR STRIP-ACNC: 0.2 EU/DL (ref 0.2–1)

## 2019-11-13 PROCEDURE — 87086 URINE CULTURE/COLONY COUNT: CPT | Performed by: UROLOGY

## 2019-11-13 PROCEDURE — 81003 URINALYSIS AUTO W/O SCOPE: CPT | Mod: QW | Performed by: UROLOGY

## 2019-11-13 PROCEDURE — 88120 CYTP URNE 3-5 PROBES EA SPEC: CPT | Performed by: UROLOGY

## 2019-11-13 PROCEDURE — 99214 OFFICE O/P EST MOD 30 MIN: CPT | Performed by: INTERNAL MEDICINE

## 2019-11-13 RX ORDER — ALBUTEROL SULFATE 0.83 MG/ML
2.5 SOLUTION RESPIRATORY (INHALATION) EVERY 6 HOURS PRN
Qty: 1 BOX | Refills: 3 | Status: SHIPPED | OUTPATIENT
Start: 2019-11-13 | End: 2019-12-12

## 2019-11-13 RX ORDER — TRAZODONE HYDROCHLORIDE 50 MG/1
100 TABLET, FILM COATED ORAL AT BEDTIME
Qty: 90 TABLET | Refills: 1 | Status: SHIPPED | OUTPATIENT
Start: 2019-11-13 | End: 2020-02-03

## 2019-11-13 RX ORDER — GABAPENTIN 300 MG/1
300 CAPSULE ORAL EVERY 12 HOURS
Qty: 180 CAPSULE | Refills: 0 | Status: SHIPPED | OUTPATIENT
Start: 2019-11-13 | End: 2020-08-06

## 2019-11-13 RX ORDER — FUROSEMIDE 20 MG
20 TABLET ORAL DAILY
Qty: 90 TABLET | Refills: 1 | Status: SHIPPED | OUTPATIENT
Start: 2019-11-13 | End: 2020-05-11

## 2019-11-13 ASSESSMENT — MIFFLIN-ST. JEOR: SCORE: 796.5

## 2019-11-13 NOTE — TELEPHONE ENCOUNTER
Left message for patient to call back. Need to confirm that this order should go to [facility].

## 2019-11-13 NOTE — NURSING NOTE
"Vital signs:  Temp: 98.4  F (36.9  C) Temp src: Oral BP: 104/72 Pulse: 67   Resp: 16 SpO2: 92 %     Height: 149.9 cm (4' 11\") Weight: 45.6 kg (100 lb 8 oz)  Estimated body mass index is 20.3 kg/m  as calculated from the following:    Height as of this encounter: 1.499 m (4' 11\").    Weight as of this encounter: 45.6 kg (100 lb 8 oz).          "

## 2019-11-13 NOTE — PROGRESS NOTES
"Subjective     Marie Kline is a 87 year old female who presents to clinic today for the following health issues:    This visit is a face to face for the oxygen concentration.     HPI   Chronic congestive heart failure, unspecified heart failure type   Pt is taking lasix 20 mg and aspirin 81. She reports that she weights  mgis weighing herself daily. Denies any chest pain. She is taking lasix.     COPD with acute exacerbation  Pt is taking Symbicort and albuterol inhaler. She is on 2 L of oxygen. Pt notes that her COPD is stable.   Oxygen concentrator, O2-85% resting room air & 02-82% walking room air    Malignant neoplasm of urinary bladder, unspecified site  Pt follows up with urology every six months for her bladder cancer.     CLL (chronic lymphocytic leukemia)   Pt follows ups with Dr. Ivory from radiology regularly.      LESLY (Generalzied anxiety disorder)  She is taking Zoloft 50 mg. Notes that her mood is doing well.    Memory loss  Pt reports that her memory is declining. Notes that she is forgetful and can not remember people's names or appointment times. This has been occurring since July, 2019. She mentions that she had a fall and hit her head during that time.  CT SCAN OF THE HEAD WITHOUT CONTRAST on 07/15/2019.  IMPRESSION: No intracranial hemorrhage or skull fractures are  identified.    Other concerns...  She mentions that she is \"shaky\" and is unable to paint anymore.   Pt notes that she is drinking boost and ensure. She has gained about 1 lbs.    Recent Labs   Lab Test 10/15/19  1457 08/16/19  1000  07/21/19  0634 07/20/19  0629  05/01/19  1144  03/12/18  1009  09/12/17 06/17/16  0400 06/16/16  0620  10/09/14  0350   A1C  --   --   --   --   --   --   --   --   --   --   --   --  5.6 5.6  --  5.6   LDL  --   --   --   --   --   --  84  --  84  --  82  --   --   --   --  88   HDL  --   --   --   --   --   --  45*  --  47*  --  44  --   --   --   --  55   TRIG  --   --   --   --   --   --  160*  --  " "98  --  95  --   --   --   --  121   ALT  --   --   --  34 14  --  14  --   --    < > 21   < >  --   --   --   --    CR 0.90 0.66   < > 0.54 0.58   < >  --    < > 1.16*   < > 1.21   < > 0.85 1.04   < > 0.96   GFRESTIMATED 57* 79   < > 85 83   < >  --    < > 44*   < > 40.7*   < > 64 50*   < > 55*   GFRESTBLACK 66 >90   < > >90 >90   < >  --    < > 54*   < >  --    < > 77 61   < > 67   POTASSIUM 4.2 3.6   < > 4.7 4.2   < >  --    < > 4.5   < > 4.2   < > 4.1 4.3   < > 5.1   TSH 0.45  --   --   --   --   --  0.46   < > 7.50*  --  2.54   < >  --   --    < > 5.80*    < > = values in this interval not displayed.      BP Readings from Last 3 Encounters:   11/13/19 104/72   11/12/19 110/68   10/15/19 112/72    Wt Readings from Last 3 Encounters:   11/13/19 45.6 kg (100 lb 8 oz)   11/12/19 44.9 kg (99 lb)   10/15/19 44.8 kg (98 lb 11.2 oz)      Reviewed and updated as needed this visit by Provider  Allergies       Review of Systems   ROS COMP: CONSTITUTIONAL: NEGATIVE for fever, chills, change in weight  RESP: NEGATIVE for significant cough or SOB  CV: NEGATIVE for chest pain, palpitations or peripheral edema  NEURO: POS for memory decline.   PSYCHIATRIC: NEGATIVE for changes in mood or affect and HX anxiety  This document serves as a record of the services and decisions personally performed and made by Piper Kiser MD. It was created on her behalf by Hailey Prajapati, a trained medical scribe. The creation of this document is based on the provider's statements to the medical scribe.  Hailey Prajapati November 13, 2019 2:19 PM       Objective    /72   Pulse 67   Temp 98.4  F (36.9  C) (Oral)   Resp 16   Ht 1.499 m (4' 11\")   Wt 45.6 kg (100 lb 8 oz)   SpO2 92%   BMI 20.30 kg/m    Body mass index is 20.3 kg/m .    O2-85% resting room air & 02-82% walking room air  Physical Exam   GENERAL: healthy, alert and no distress  RESP: lungs clear to auscultation - no rales, rhonchi or wheezes  CV: regular rate and rhythm, normal " S1 S2, no S3 or S4, no murmur, click or rub, no peripheral edema and peripheral pulses strong  PSYCH: mentation appears normal, affect normal/bright    Diagnostic Test Results:  Labs reviewed in Epic  No results found for this or any previous visit (from the past 24 hour(s)).        Assessment & Plan     (I50.9) Chronic congestive heart failure, unspecified heart failure type (H)  (primary encounter diagnosis)  Comment:   Plan: furosemide (LASIX) 20 MG tablet            (J44.1) COPD with acute exacerbation (H)  Comment: stable  Plan: albuterol (PROVENTIL) (2.5 MG/3ML) 0.083% neb         solution            (C67.9) Malignant neoplasm of urinary bladder, unspecified site (H)  Comment:   Plan: Pt follows up with Urology.     (C91.10) CLL (chronic lymphocytic leukemia) (HCC)  Comment:   Plan: Pt follows up with Radiology.     (F41.1) LESLY (generalized anxiety disorder)  Comment:   Plan: gabapentin (NEURONTIN) 300 MG capsule            (F41.9) Anxiety  Comment: stable  Plan: sertraline (ZOLOFT) 50 MG tablet            (F51.01) Primary insomnia  Comment:   Plan: traZODone (DESYREL) 50 MG tablet            (R41.3) Memory loss  Comment: Assess  Plan: Vitamin B12, NEUROLOGY ADULT REFERRAL        Recommended pt to be seen by a specialist.     FUTURE APPOINTMENTS:  No follow-ups on file.    The information in this document, created by the medical scribe for me, accurately reflects the services I personally performed and the decisions made by me. I have reviewed and approved this document for accuracy.   November 13, 2019 2:34 PM   Piper Kiser MD  [facility]      ADDENDUM (11/22/2019)  Chart reviewed. This patient has chronic systolic heart failure, with estimated LV ejection fraction around 30 percent, Severe chronic obstructive pulmonary disease with chronic hypoxia, Chronic lymphocytic leukemia, bladder cancer, with fragile medical status with falls, severe osteoporosis and related multiple fractures.      Her indications for chronic oxygen are self-evident.     Dillan Tang MD, (covering for Dr Kiser who is out of the office).   [facility]  November 22, 2019

## 2019-11-14 ENCOUNTER — TELEPHONE (OUTPATIENT)
Dept: INTERNAL MEDICINE | Facility: CLINIC | Age: 84
End: 2019-11-14

## 2019-11-14 DIAGNOSIS — J44.9 CHRONIC OBSTRUCTIVE PULMONARY DISEASE WITH HYPOXIA (H): ICD-10-CM

## 2019-11-14 DIAGNOSIS — J44.1 COPD WITH ACUTE EXACERBATION (H): ICD-10-CM

## 2019-11-14 DIAGNOSIS — I50.22 CHRONIC SYSTOLIC HEART FAILURE (H): Primary | ICD-10-CM

## 2019-11-14 LAB
BACTERIA SPEC CULT: NO GROWTH
Lab: NORMAL
SPECIMEN SOURCE: NORMAL

## 2019-11-14 NOTE — TELEPHONE ENCOUNTER
Reason for Call:  call back    Detailed comments: Iona from [facility] [employer] calling to ask what liter flow is needed for the patient so they can send an order request. She will also need a face to face note. Her phone number is [phone] and their fax number is [phone]    Phone Number Patient can be reached at: Other phone number:  [facility] [phone]    Best Time: any    Can we leave a detailed message on this number? YES    Call taken on 11/14/2019 at 10:10 AM by Jolly Monet

## 2019-11-14 NOTE — TELEPHONE ENCOUNTER
Please complete dictation from office visit 11-14-19 and needs to state in dictation, a face to face visit.    Please advise, thanks.

## 2019-11-14 NOTE — TELEPHONE ENCOUNTER
Pt calling back. Writer not sure that [facility] carries oxygen concentrators? Pt would like order sent to [facility] in [place]. Pt would like call back in 1 hour. [facility]. Please advise. Thanks.

## 2019-11-14 NOTE — TELEPHONE ENCOUNTER
Last office visit 11-13-19    Per dictation:  COPD with acute exacerbation  Pt is taking Symbicort and albuterol inhaler. She is on 2 L of oxygen. Pt notes that her COPD is stable.     Please see message below.  Is 2L of O2 the correct liter flow?    Please advise, thanks.

## 2019-11-14 NOTE — TELEPHONE ENCOUNTER
DME order faxed per [place] () to [facility] in [place].    Detailed message left on patient's voice mail.

## 2019-11-18 NOTE — TELEPHONE ENCOUNTER
refaxed dme rx for oxygen concentrator to [facility]. Pt aware of this and wants this there as it's closer to home.

## 2019-11-18 NOTE — TELEPHONE ENCOUNTER
Patient is calling to ask us to send this oxygen order to [facility] because [facility] does n ot have what she needs. Writer spoke to [facility] as there seems to be some confusion if [facility] medical, [facility] or [facility] is suppose to fulfill this order. [facility] did receive order and will start the process and let us know if they need anything else from us.

## 2019-11-20 NOTE — TELEPHONE ENCOUNTER
Contacted [facility] to discuss Oxygen order. Bibiana reviewed patient's file and states they are unable to take patient on as a client as she has been with [facility] since January 2019. Bibiana said they left a message for patient today with this information.     Contacted patient, she did not receive the message from [facility] that they could not take her on as a client. Patient states that she had been working with [facility], but they weren't doing anything to help her with this so she wanted to try to switch companies.   [facility] phone: [phone]    Called [facility] and left voice message for them to call the clinic back regarding patient.

## 2019-11-20 NOTE — TELEPHONE ENCOUNTER
Patient is calling because she said [facility] needs to know what liter of oxygen to give her. Please fax order to [facility] at [phone].  Patient would like this to be done asap as she is trying to go out of town for Thanksgiving.

## 2019-11-21 ENCOUNTER — PATIENT OUTREACH (OUTPATIENT)
Dept: CARE COORDINATION | Facility: CLINIC | Age: 84
End: 2019-11-21

## 2019-11-21 NOTE — PROGRESS NOTES
Scheduled Follow Up Call: Attempt 1   Community Health Worker called and left a message for the patient. If the patient is returning my call, please transfer the patient to [facility] at [phone].    ______________________  Next Outreach: 12/10/19  Planned Outreach Frequency: Monthly  Preferred Phone Number: [phone]    Last Assessment Date: 05/16/19  Care Plan Completion Date: 05/16/19  ______________________  Goals       baseline health      Goal Statement: I will get back my independence.  Measure of Success: I will not have any pain.  I will be at baseline activity level.  I will not have any ED/IP admissions related to chronic health conditions.    Supportive Steps to Achieve: Patient to take medications as prescribed.  Patient will follow up with providers as recommended.  Patient will follow home care recommendations.  Patient will be supported by RN CC and community paramedic.  Barriers: back pain, not managed well.  Strengths: Patient is able to advocate for self.  Willing to accept recommendations and feedback.  Date to Achieve By: 1 dec 2019  Patient expressed understanding of goal: yes.    As of today's date 10/22/2019 goal is met at 51 - 75%.   Goal Status:  Ongoing  Patient doesn't feel any pain anymore, however, wants to work on not having to rely on her oxygen anymore.    As of today's date 10/17/2019 goal is met at 51 - 75%.   Goal Status:  Ongoing  Patient was discharged from home care services on 10/14.  Patient had pcp f/u on 10/15.  Patient now wearing chronic O2.

## 2019-11-22 ENCOUNTER — PATIENT OUTREACH (OUTPATIENT)
Dept: CARE COORDINATION | Facility: CLINIC | Age: 84
End: 2019-11-22

## 2019-11-22 LAB — COPATH REPORT: NORMAL

## 2019-11-22 NOTE — TELEPHONE ENCOUNTER
Placed an addendum on DR Kiser's last office visit.   Rx signed for oxygen concentrator.     Please forward necessary info to [facility].

## 2019-11-22 NOTE — TELEPHONE ENCOUNTER
Updated patient that this RN spoke to [facility] today and faxed them a new prescription and recent visit notes. This RN put a note on the cover sheet asking [facility] to call the clinic if they need anything further for the oxygen concentrator.

## 2019-11-22 NOTE — PROGRESS NOTES
"Community Health Worker called the patient for Clinic Care Coordination outreach follow up on goals and action steps.  Spoke to Marie    Discussed the following  Getting back independence    Marie states that Dr Kiser is working on getting her a portable oxygen concentrator so that she's \"not quaranteened to her apartment\"    Her Daughter and Son-in-law invited her to have Thanksgiving Dinner with them, however, [place].    Patient states that she has portable tanks, however, they only last an hour and a half and are very heavy.    Patient is hopeful that she'll get this oxygen concentrator in time to go out and celebrate Thanksgiving with her family.    Being able to paint again    Marie's hand tremors are still affecting her ability to paint.    She discussed this issue with Dr Kiser, but states that the Dr didn't prescribe her anything for it.    She is scheduled to see a Neurologist on 11/26/19 at [facility] with Dr Guillory.    Hopefully they're able to discuss the tremors.    ______________________  Next Outreach: 12/23/19  Planned Outreach Frequency: Monthly  Preferred Phone Number: [phone]    Last Assessment Date: 05/16/19  Care Plan Completion Date: 05/16/19  ______________________  Goals       baseline health      Goal Statement: I will get back my independence.  Measure of Success: I will not have any pain.  I will be at baseline activity level.  I will not have any ED/IP admissions related to chronic health conditions.    Supportive Steps to Achieve: Patient to take medications as prescribed.  Patient will follow up with providers as recommended.  Patient will follow home care recommendations.  Patient will be supported by RN CC and community paramedic.  Barriers: back pain, not managed well.  Strengths: Patient is able to advocate for self.  Willing to accept recommendations and feedback.  Date to Achieve By: 1 dec 2019  Patient expressed understanding of goal: " "yes.    As of today's date 11/22/2019 goal is met at 51 - 75%.   Goal Status:  Showing progress  Patient has been working on getting a portable oxygen concentrator, so that she's not \"quaranteened to her apartment\"    As of today's date 10/22/2019 goal is met at 51 - 75%.   Goal Status:  Ongoing  Patient doesn't feel any pain anymore, however, wants to work on not having to rely on her oxygen anymore.    As of today's date 10/17/2019 goal is met at 51 - 75%.   Goal Status:  Ongoing  Patient was discharged from home care services on 10/14.  Patient had pcp f/u on 10/15.  Patient now wearing chronic O2.                paint (pt-stated)      Goal Statement: I will be able to paint my birds again!  Date goal set: 10/23/19   Measure of Success: Patient will have decreased tremors that prohibit her from painting.  Barriers: Tremors/dizziness  Strengths: positive attitude.  Good support system.  Social. Caty.  Date to Achieve By: 1 Dec 2019   Patient expressed understanding of goal: yes    Action steps to achieve this goal  1. I will speak to MTM about SE of medications   2. I will f/u with provider as recommended.  3. I will stay active and social.    As of today's date 11/22/2019 goal is met at 26 - 50%.   Goal Status:  Ongoing  Talked to Dr Kiser about this, but stated she did not give any new Rx's for this. Schedule to see Neulogist on 11/26/19.                    "

## 2019-11-22 NOTE — TELEPHONE ENCOUNTER
Called [facility] and spoke to [facility].   They will need a new prescription as the order they have is  - the order they have was from September and is only good for 30 days.     They also visit notes for support. Read Malena the visit note from 19 that discusses oxygen use and [facility] states the visit note needs to say why she needs or would benefit from using the portable oxygen concentrator.     Fax: [phone]    Per patient, she does not currently have a portable oxygen concentrator to take out of the house.     DME order pended for concentrator.  Routed to covering provider-Dr. Tang, to review if Dr. Kiser's 19 visit notes can be updated.

## 2019-11-25 ENCOUNTER — ALLIED HEALTH/NURSE VISIT (OUTPATIENT)
Dept: PHARMACY | Facility: CLINIC | Age: 84
End: 2019-11-25
Payer: COMMERCIAL

## 2019-11-25 ENCOUNTER — TELEPHONE (OUTPATIENT)
Dept: INTERNAL MEDICINE | Facility: CLINIC | Age: 84
End: 2019-11-25

## 2019-11-25 DIAGNOSIS — S32.9XXD CLOSED NONDISPLACED FRACTURE OF PELVIS WITH ROUTINE HEALING, UNSPECIFIED PART OF PELVIS, SUBSEQUENT ENCOUNTER: ICD-10-CM

## 2019-11-25 DIAGNOSIS — J44.1 COPD EXACERBATION (H): Primary | ICD-10-CM

## 2019-11-25 PROCEDURE — 99606 MTMS BY PHARM EST 15 MIN: CPT | Performed by: PHARMACIST

## 2019-11-25 PROCEDURE — 99607 MTMS BY PHARM ADDL 15 MIN: CPT | Performed by: PHARMACIST

## 2019-11-25 NOTE — TELEPHONE ENCOUNTER
[facility] Patient Assistance Program  Enrollment Form  Dr Tang's yellow folder due to Dr Kiser's absence  Mail in envelope provided

## 2019-11-25 NOTE — PROGRESS NOTES
SUBJECTIVE/OBJECTIVE:                Marie Kline is a 87 year old female called for a follow-up visit for Medication Therapy Management.  She was referred to me from Dr. Kiser.     Chief Complaint: Follow up from [facility] visit on 10/15/19.  Still trying to get oxygen concentrator.    Tobacco:  reports that she quit smoking about 23 years ago. Her smoking use included cigarettes. She has never used smokeless tobacco.  Alcohol: not currently using    Medication Adherence/Access:  no issues reported    Shoulder/Pelvis fracture:  Discharged from NH on 9/12/19 after two month stay.  Was in a sling for 7 weeks.  Did not have surgery.  No pain at this time. Taking gabapentin 300 mg BID, APAP 1000 mg every 8 hours as needed (has not needed).      COPD: Current medications:  Proventil BID (she has been using this has her control by mistake), albuterol Nebs every morning (helps with congestion) and Dulera BID.    Reports ongoing constant tremor; doesn't feel this is worse after using nebs or rescue inhaler.  She is scheduled with Neurology tomorrow.  Does not rinse after using the Dulera.    Using oxygen throughout most of the day.  Is trying to get concentrator so she has more mobility.   Pt is not experiencing side effects.   Pt reports the following symptoms: none at this time  Pt does not have an COPD Action Plan on file.   Has spirometry been completed: Yes   Followed by Dr. Courtney.          Today's Vitals: There were no vitals taken for this visit. phone visit      ASSESSMENT:                  Medication Adherence: excellent, no issues identified    Shoulder/Pelvis fracture:  Improved. Continue with current regimen.    COPD: recommend rinsing after using Dulera.  She will follow-up with Dr. Courtney as planned.  Could consider changing albuterol to Xopenex if tremors continue and no other cause identified by Neurology.       PLAN:                  1.  Rinse and spit after using Dulera    I spent 30 minutes with this  patient today. All changes were made via collaborative practice agreement with PCP. A copy of the visit note was provided to the patient's primary care provider.     Will follow up in 1 month.    The patient declined a summary of these recommendations as an after visit summary.    Yuliana Cast , Pharm D  [phone] (phone)  [phone] (pager)  Medication Therapy Management Pharmacist

## 2019-11-25 NOTE — TELEPHONE ENCOUNTER
Form requesting names of medications being ordered. Per chart review, patient was previously getting Dulera and Proventil inhalers from [facility].   Medication section completed. Form in Dr. Tang's desk to complete.

## 2019-11-26 ENCOUNTER — TRANSFERRED RECORDS (OUTPATIENT)
Dept: HEALTH INFORMATION MANAGEMENT | Facility: CLINIC | Age: 84
End: 2019-11-26

## 2019-12-03 ENCOUNTER — TRANSFERRED RECORDS (OUTPATIENT)
Dept: HEALTH INFORMATION MANAGEMENT | Facility: CLINIC | Age: 84
End: 2019-12-03

## 2019-12-04 ENCOUNTER — HOSPITAL ENCOUNTER (OUTPATIENT)
Dept: CT IMAGING | Facility: CLINIC | Age: 84
Discharge: HOME | End: 2019-12-04
Attending: PSYCHIATRY & NEUROLOGY | Admitting: PSYCHIATRY & NEUROLOGY
Payer: MEDICARE

## 2019-12-04 DIAGNOSIS — R41.89 COGNITIVE DECLINE: ICD-10-CM

## 2019-12-04 DIAGNOSIS — R29.6 FALLS: ICD-10-CM

## 2019-12-04 DIAGNOSIS — R41.3 MEMORY LOSS: ICD-10-CM

## 2019-12-04 DIAGNOSIS — R06.83 SNORING: ICD-10-CM

## 2019-12-04 PROCEDURE — 70450 CT HEAD/BRAIN W/O DYE: CPT

## 2019-12-12 ENCOUNTER — TELEPHONE (OUTPATIENT)
Dept: PHARMACY | Facility: CLINIC | Age: 84
End: 2019-12-12

## 2019-12-12 DIAGNOSIS — J44.1 COPD WITH ACUTE EXACERBATION (H): ICD-10-CM

## 2019-12-12 RX ORDER — ALBUTEROL SULFATE 0.83 MG/ML
2.5 SOLUTION RESPIRATORY (INHALATION) EVERY 6 HOURS PRN
Qty: 1 BOX | Refills: 3 | Status: SHIPPED | OUTPATIENT
Start: 2019-12-12 | End: 2021-04-23

## 2019-12-12 NOTE — TELEPHONE ENCOUNTER
Marie called asking for refills on albuterol nebs.  Refills were sent in Nov to [facility].   Called [facility] today, they never got refill last month.  Sent again today.

## 2019-12-23 ENCOUNTER — PATIENT OUTREACH (OUTPATIENT)
Dept: CARE COORDINATION | Facility: CLINIC | Age: 84
End: 2019-12-23

## 2019-12-23 NOTE — PROGRESS NOTES
"Community Health Worker LM for [facility] on 12/19/19 and 12/23/19.  Have not been able to reach anyone regarding Marie's Portable O2 Concentrator.    Contacted Marie to let her know that I've been trying to contact [employer] with no success.  Marie states that she is planning to stay with her daughter in [place] for [place] and would like to have the concentrator, as it would be easier than bringing her portable tank.    Plan:  I will plan to [facility].    ______________________  Next Outreach: 12/27/19  Planned Outreach Frequency: Monthly  Preferred Phone Number: [phone]    Last Assessment Date: 05/16/19  Care Plan Completion Date: 05/16/19  ______________________  Goals       baseline health      Goal Statement: I will get back my independence.  Measure of Success: I will not have any pain.  I will be at baseline activity level.  I will not have any ED/IP admissions related to chronic health conditions.    Supportive Steps to Achieve: Patient to take medications as prescribed.  Patient will follow up with providers as recommended.  Patient will follow home care recommendations.  Patient will be supported by RN CC and community paramedic.  Barriers: back pain, not managed well.  Strengths: Patient is able to advocate for self.  Willing to accept recommendations and feedback.  Date to Achieve By: 1 dec 2019  Patient expressed understanding of goal: yes.    As of today's date 11/22/2019 goal is met at 51 - 75%.   Goal Status:  Showing progress  Patient has been working on getting a portable oxygen concentrator, so that she's not \"quaranteened to her apartment\"    As of today's date 10/22/2019 goal is met at 51 - 75%.   Goal Status:  Ongoing  Patient doesn't feel any pain anymore, however, wants to work on not having to rely on her oxygen anymore.    As of today's date 10/17/2019 goal is met at 51 - 75%.   Goal Status:  Ongoing  Patient was discharged from home care services on 10/14.  Patient had pcp " f/u on 10/15.  Patient now wearing chronic O2.                paint (pt-stated)      Goal Statement: I will be able to paint my birds again!  Date goal set: 10/23/19   Measure of Success: Patient will have decreased tremors that prohibit her from painting.  Barriers: Tremors/dizziness  Strengths: positive attitude.  Good support system.  Social. Caty.  Date to Achieve By: 1 Dec 2019   Patient expressed understanding of goal: yes    Action steps to achieve this goal  1. I will speak to MTM about SE of medications   2. I will f/u with provider as recommended.  3. I will stay active and social.    As of today's date 11/22/2019 goal is met at 26 - 50%.   Goal Status:  Ongoing  Talked to Dr Kiser about this, but stated she did not give any new Rx's for this. Schedule to see Neulogist on 11/26/19.

## 2019-12-24 NOTE — PROGRESS NOTES
Spoke to Malena from [facility].  Malena states that there were no notes as to why MD was ordering the portable oxygen concentrator on [employer] notes.  Malena stated she needed to check with billing to see what was going on and if they could reprocess the order, or if a new one needs to be submitted.  I have provided her with my phone number to contact me.

## 2019-12-27 ENCOUNTER — TELEPHONE (OUTPATIENT)
Dept: CARE COORDINATION | Facility: CLINIC | Age: 84
End: 2019-12-27

## 2019-12-31 ENCOUNTER — TELEPHONE (OUTPATIENT)
Dept: CARE COORDINATION | Facility: CLINIC | Age: 84
End: 2019-12-31

## 2019-12-31 DIAGNOSIS — J20.9 ACUTE BRONCHITIS WITH SYMPTOMS > 10 DAYS: Primary | ICD-10-CM

## 2019-12-31 RX ORDER — AZITHROMYCIN 250 MG/1
TABLET, FILM COATED ORAL
Qty: 6 TABLET | Refills: 0 | Status: SHIPPED | OUTPATIENT
Start: 2019-12-31 | End: 2020-02-03

## 2019-12-31 NOTE — TELEPHONE ENCOUNTER
Received call from patient stating she has had productive cough for a few weeks now and is requesting a prescription for a Z Pack.      Patient denies fever or increased shortness of breath.  She is producing clear phlegm.  Cough is keeping her up at night and has difficulty carrying conversation due to cough.    Please advise.    Babs Garcia RN  Care Coordination  Phone:  [phone]  Email: [email or web address], [place] and [facility]

## 2020-01-06 NOTE — TELEPHONE ENCOUNTER
At 1:46pm, received phone call from Marie to schedule an appointment with Dr Tang.  Scheduled for 2/26/20 at 1pm.    Marie also stated that she hasn't heard back regarding the O2 Concentrator.  CHW informed patient that I have been unsuccessful in reaching [facility] since the last time we had spoke.    [facility]  I will send an email to [facility].

## 2020-01-06 NOTE — TELEPHONE ENCOUNTER
Dr Kiser or Dr Tang    Spoke to Malena from [facility] for the portable O2 concentrator that we've been trying to get for this patient.  They're also needing notes supporting why the O2 Concentrator is needed and benefits.  Please fax this over to [phone]    Richard Zelaya LUCY                                                                                          Clinic Care Coordination                                                  [facility] : [place], [place], [place] and [place]                              Phone: [phone]

## 2020-01-21 ENCOUNTER — TELEPHONE (OUTPATIENT)
Dept: INTERNAL MEDICINE | Facility: CLINIC | Age: 85
End: 2020-01-21

## 2020-01-21 ENCOUNTER — TELEPHONE (OUTPATIENT)
Dept: CARE COORDINATION | Facility: CLINIC | Age: 85
End: 2020-01-21

## 2020-01-21 NOTE — TELEPHONE ENCOUNTER
"Received call from patient.  \"I think I need another prescription for a Z Tyson\".    Patient c/o increased shortness of breath with activity.  Productive/loose cough.  Patient states she is coughing up green/yellow phlegm.  Denies fever.  But feels very tired.  Patient verified she is using nebulizer and seems to only help for a little while.  She is taking inhalers as well.  Z Tyson was prescribed by Dr. Tang on 12/31 and patient states she has completed the course.    Patient encouraged to schedule OV due to symptoms are concerning. She may have something else going on that should be addressed by provider.  Patient agreed.    Sent [employer] message to PCPs Na ALBERTO to see if we can get patient OV tomorrow.  RN CC waiting to hear back.      Babs Garcia RN  Care Coordination  Phone:  [phone]  Email: [email or web address]  [facility], [place] and [facility]        "

## 2020-01-21 NOTE — TELEPHONE ENCOUNTER
FYI~ January 21, 2020 I spoke with Marie, she is in need of financial assistance for medication.     We reviewed the Pharmacy Assistance Fund $500, the Prescription Assistance Program for manfacturer brand name assistance programs, gross income, Rx insurance and Rx list.     She is over income for the Pharmacy Assistance Fund.  I will be completing  assistance re-enrollment applications for Dulera & Proventil HFA.    Thank you,    Vandana Gonzalez  Prescription Assistance

## 2020-01-22 ENCOUNTER — OFFICE VISIT (OUTPATIENT)
Dept: INTERNAL MEDICINE | Facility: CLINIC | Age: 85
End: 2020-01-22
Payer: MEDICARE

## 2020-01-22 VITALS
DIASTOLIC BLOOD PRESSURE: 60 MMHG | TEMPERATURE: 96.4 F | HEART RATE: 73 BPM | RESPIRATION RATE: 16 BRPM | BODY MASS INDEX: 19.44 KG/M2 | WEIGHT: 96.4 LBS | OXYGEN SATURATION: 88 % | HEIGHT: 59 IN | SYSTOLIC BLOOD PRESSURE: 96 MMHG

## 2020-01-22 DIAGNOSIS — J96.22 ACUTE AND CHRONIC RESPIRATORY FAILURE WITH HYPERCAPNIA (H): ICD-10-CM

## 2020-01-22 DIAGNOSIS — I50.9 CHRONIC CONGESTIVE HEART FAILURE, UNSPECIFIED HEART FAILURE TYPE (H): ICD-10-CM

## 2020-01-22 DIAGNOSIS — N18.30 CHRONIC KIDNEY DISEASE, STAGE 3 (MODERATE): ICD-10-CM

## 2020-01-22 DIAGNOSIS — C91.10 CLL (CHRONIC LYMPHOCYTIC LEUKEMIA) (H): ICD-10-CM

## 2020-01-22 DIAGNOSIS — F41.1 GAD (GENERALIZED ANXIETY DISORDER): ICD-10-CM

## 2020-01-22 DIAGNOSIS — C67.9 MALIGNANT NEOPLASM OF URINARY BLADDER, UNSPECIFIED SITE (H): ICD-10-CM

## 2020-01-22 DIAGNOSIS — J44.1 COPD WITH ACUTE EXACERBATION (H): Primary | ICD-10-CM

## 2020-01-22 PROCEDURE — 99214 OFFICE O/P EST MOD 30 MIN: CPT | Performed by: INTERNAL MEDICINE

## 2020-01-22 ASSESSMENT — MIFFLIN-ST. JEOR: SCORE: 777.9

## 2020-01-22 NOTE — PROGRESS NOTES
"Subjective     Marie Kline is a 87 year old female who presents to clinic today for the following health issues:    HPI   COPD with acute exacerbation   Pt would like to have a portable oxygen concentrator. She notes that she has a oxygen tank and states, \"it is really heavy to load and unload it.\"   Her O2 is 88% at rest but decreases when she is walking.   Pt  Is taking Durera and albuterol neb solution. She sees a pulmonologist for her COPD.     Chronic congestive heart failure, unspecified heart failure type   Pt is taking Laxis 20 mg. She is not seeing a cardiologist.  Denies any chest pain and states that her breathing is staying stable.     Acute and chronic respiratory failure with hypercapnia   Pt reports she is still coughing up phlegm. She was started on the Z-pack on 12/31/2019. Mentions that her cough has improved but she the phlegm she coughs up is thick and yellow in color.   Denies any fevers or chills.     CLL (chronic lymphocytic leukemia)   Pt sees Dr. Ivory regularly.     Chronic kidney disease, stage 3   Pt sees a specialist regularly. Denies using any ibuprofen.    LESLY (generalized anxiety disorder)  LESLY-7 SCORE 12/19/2018   Total Score 12     Pt is taking Zoloft 50 mg. She denies having anxiety.    Recent Labs   Lab Test 10/15/19  1457 08/16/19  1000  07/21/19  0634 07/20/19  0629  05/01/19  1144  03/12/18  1009  09/12/17 06/17/16  0400 06/16/16  0620  10/09/14  0350   A1C  --   --   --   --   --   --   --   --   --   --   --   --  5.6 5.6  --  5.6   LDL  --   --   --   --   --   --  84  --  84  --  82  --   --   --   --  88   HDL  --   --   --   --   --   --  45*  --  47*  --  44  --   --   --   --  55   TRIG  --   --   --   --   --   --  160*  --  98  --  95  --   --   --   --  121   ALT  --   --   --  34 14  --  14  --   --    < > 21   < >  --   --   --   --    CR 0.90 0.66   < > 0.54 0.58   < >  --    < > 1.16*   < > 1.21   < > 0.85 1.04   < > 0.96   GFRESTIMATED 57* 79   < > 85 83   < > " " --    < > 44*   < > 40.7*   < > 64 50*   < > 55*   GFRESTBLACK 66 >90   < > >90 >90   < >  --    < > 54*   < >  --    < > 77 61   < > 67   POTASSIUM 4.2 3.6   < > 4.7 4.2   < >  --    < > 4.5   < > 4.2   < > 4.1 4.3   < > 5.1   TSH 0.45  --   --   --   --   --  0.46   < > 7.50*  --  2.54   < >  --   --    < > 5.80*    < > = values in this interval not displayed.      BP Readings from Last 3 Encounters:   01/22/20 96/60   11/13/19 104/72   11/12/19 110/68    Wt Readings from Last 3 Encounters:   01/22/20 43.7 kg (96 lb 6.4 oz)   11/13/19 45.6 kg (100 lb 8 oz)   11/12/19 44.9 kg (99 lb)        Reviewed and updated as needed this visit by Provider       Review of Systems   ROS COMP: CONSTITUTIONAL: NEGATIVE for fever, chills, change in weight  RESP: NEGATIVE for significant SOB. POS for a cough.  CV: NEGATIVE for chest pain, palpitations or peripheral edema  PSYCHIATRIC: NEGATIVE for changes in mood or affect    This document serves as a record of the services and decisions personally performed and made by Piper Kiser MD. It was created on her behalf by Hailey Prajapati, a trained medical scribe. The creation of this document is based on the provider's statements to the medical scribe.  Hailey Prajapati January 22, 2020 2:08 PM         Objective    BP 96/60 (BP Location: Left arm, Patient Position: Sitting, Cuff Size: Adult Regular)   Pulse 73   Temp 96.4  F (35.8  C) (Oral)   Resp 16   Ht 1.499 m (4' 11\")   Wt 43.7 kg (96 lb 6.4 oz)   SpO2 (!) 88%   BMI 19.47 kg/m    Body mass index is 19.47 kg/m .  Physical Exam   GENERAL: thin, alert and no distress  RESP: lungs clear to auscultation - no rales, rhonchi or wheezes  CV: regular rate and rhythm, normal S1 S2, no S3 or S4, no murmur, click or rub  PSYCH: mentation appears normal, affect normal/bright    Diagnostic Test Results:  Labs reviewed in Epic  No results found for this or any previous visit (from the past 24 hour(s)).        Assessment & Plan     (J44.1) COPD " with acute exacerbation (H)  (primary encounter diagnosis)  Comment: stable  Plan: Pt sees pulmonology regularly.  -needs oxygen concentrator form filled out    (I50.9) Chronic congestive heart failure, unspecified heart failure type (H)  Comment: stable  Plan: monitor    (J96.22) Acute and chronic respiratory failure with hypercapnia (H)  Comment:   Plan:     (C91.10) CLL (chronic lymphocytic leukemia) (H)  Comment:   Plan: Pt sees a specialist regularly.     (N18.3) Chronic kidney disease, stage 3 (moderate) (H)  Comment:   Plan: avoid NSAIDs    (C67.9) Malignant neoplasm of urinary bladder, unspecified site (H)  Comment:   Plan:     (F41.1) LESLY (generalized anxiety disorder)  Comment:   Plan:      FUTURE APPOINTMENTS:  Return in about 6 months (around 7/22/2020).    The information in this document, created by the medical scribe for me, accurately reflects the services I personally performed and the decisions made by me. I have reviewed and approved this document for accuracy.   January 22, 2020 2:28 PM     Piper Kiser MD  [facility]

## 2020-01-27 ENCOUNTER — TELEPHONE (OUTPATIENT)
Dept: INTERNAL MEDICINE | Facility: CLINIC | Age: 85
End: 2020-01-27

## 2020-01-28 ENCOUNTER — TELEPHONE (OUTPATIENT)
Dept: PHARMACY | Facility: CLINIC | Age: 85
End: 2020-01-28

## 2020-01-28 DIAGNOSIS — J20.9 ACUTE BRONCHITIS, UNSPECIFIED ORGANISM: Primary | ICD-10-CM

## 2020-01-28 RX ORDER — DOXYCYCLINE HYCLATE 100 MG
100 TABLET ORAL 2 TIMES DAILY
Qty: 20 TABLET | Refills: 0 | Status: SHIPPED | OUTPATIENT
Start: 2020-01-28 | End: 2020-04-16

## 2020-01-28 NOTE — TELEPHONE ENCOUNTER
Marie called to report ongoing productive cough and this morning her phlegm dark green.  Phlegm is very hard to get up.  Has tried Mucinex as recommended with minimal benefit.  Using Albuterol nebs BID which does helps.  Has been using Dulera twice as prescribed.    Will notify Dr. Kiser of changes in symptoms and ask her advice.

## 2020-01-28 NOTE — TELEPHONE ENCOUNTER
Called patient.     She continues to cough dark phlegm. She has had a cough for one month. zpak did not resolve the symptoms.     Sent doxycycline to the pharmacy.

## 2020-01-30 ENCOUNTER — PATIENT OUTREACH (OUTPATIENT)
Dept: CARE COORDINATION | Facility: CLINIC | Age: 85
End: 2020-01-30

## 2020-01-30 NOTE — PROGRESS NOTES
"Community Health Worker called [facility] to get an update on the Portable Oxygen Concentrator.  Spoke with Malena.    Malena states that she spoke with Marie last week and per Malena.  \"Marie stated she didn't want to process anything until summer.\"  Malena states that the Rx that was sent it was from October, so a new Rx is needed.  There is still no supporting notes of why the POC is needed.    Community Health Worker called the patient for Clinic Care Coordination outreach follow up on goals and action steps.  Spoke to Marie    Informed Marie of the information that Malena had given me.  Marie became upset stating what Malena stated was a lie.  She stated why would she wait to get something that she needed.  I had informed Marie that I have had Dr Kiser resend her Rx and notes multiple times, but it seems to not be enough for [facility].  Marie stated that her original order and authorization came from Dr Hutchins from [facility].  I will plan on following up with Dr Hutchins's office.    Marie asked if she would be able to switch DME companies.  I had explained to Marie that I didn't think that would be possible.  Because when they originally ordered the POC, they had sent it to another DME.  But because she was already receiving her O2 tank through [facility], they wouldn't fulfill it.    Marie stated that her son Catracho, who currently lives in [place], had a stroke yesterday.  Marie sounded upset and sad that she couldn't be there with him.  He was transferred to the ICU in [place], [place].  She stated that she spoke to him this morning, and he sounded well.  She wanted to go to chapel today to pray for him, however, thought she would wait today because he was planning to call her back.  Encouraged Marie that if she really felt the need to pray for him, to just go to chapel, as it might make her feel a little better.  Marie thanked me and thought that she might do that.    Plan  I will follow up with Dr Hutchins's office to get a new " POC order sent to [facility].    ______________________  Next Outreach: 02/19/20  Planned Outreach Frequency: Monthly  Preferred Phone Number: [phone]    Last Assessment Date: 05/16/19  Care Plan Completion Date: 05/16/19  ______________________

## 2020-02-03 ENCOUNTER — ALLIED HEALTH/NURSE VISIT (OUTPATIENT)
Dept: PHARMACY | Facility: CLINIC | Age: 85
End: 2020-02-03
Payer: COMMERCIAL

## 2020-02-03 DIAGNOSIS — E03.9 HYPOTHYROIDISM, UNSPECIFIED TYPE: ICD-10-CM

## 2020-02-03 DIAGNOSIS — F51.01 PRIMARY INSOMNIA: ICD-10-CM

## 2020-02-03 DIAGNOSIS — S72.002D CLOSED FRACTURE OF NECK OF LEFT FEMUR WITH ROUTINE HEALING, SUBSEQUENT ENCOUNTER: Primary | ICD-10-CM

## 2020-02-03 DIAGNOSIS — C91.10 CLL (CHRONIC LYMPHOCYTIC LEUKEMIA) (H): ICD-10-CM

## 2020-02-03 DIAGNOSIS — F41.1 GAD (GENERALIZED ANXIETY DISORDER): ICD-10-CM

## 2020-02-03 DIAGNOSIS — J44.1 COPD WITH ACUTE EXACERBATION (H): ICD-10-CM

## 2020-02-03 DIAGNOSIS — E78.5 HYPERLIPIDEMIA WITH TARGET LDL LESS THAN 130: ICD-10-CM

## 2020-02-03 PROCEDURE — 99607 MTMS BY PHARM ADDL 15 MIN: CPT | Performed by: PHARMACIST

## 2020-02-03 PROCEDURE — 99605 MTMS BY PHARM NP 15 MIN: CPT | Performed by: PHARMACIST

## 2020-02-03 RX ORDER — ALBUTEROL SULFATE 0.83 MG/ML
SOLUTION RESPIRATORY (INHALATION)
COMMUNITY
Start: 2014-04-25 | End: 2020-02-03

## 2020-02-03 RX ORDER — ALBUTEROL SULFATE 90 UG/1
2 AEROSOL, METERED RESPIRATORY (INHALATION) EVERY 6 HOURS PRN
COMMUNITY
Start: 2020-02-03 | End: 2021-12-07

## 2020-02-03 RX ORDER — TRAZODONE HYDROCHLORIDE 50 MG/1
50 TABLET, FILM COATED ORAL AT BEDTIME
Qty: 90 TABLET | Refills: 1 | COMMUNITY
Start: 2020-02-03 | End: 2020-05-04

## 2020-02-03 NOTE — PROGRESS NOTES
SUBJECTIVE/OBJECTIVE:                Marie Kline is a 87 year old female called for a follow-up visit for Medication Therapy Management.  She was referred to me from Dr. Kiser.   First visit in 2020.    Chief Complaint: Follow up from [facility] visit on 11/25/19.  No concerns with medication    Tobacco:  reports that she quit smoking about 24 years ago. Her smoking use included cigarettes. She has never used smokeless tobacco.  Alcohol: not currently using    Medication Adherence/Access:  no issues reported    Shoulder/Pelvis fracture:  Discharged from NH on 9/12/19 after two month stay.  Was in a sling for 7 weeks.  Taking gabapentin 300 mg BID, APAP 1000 mg every 8 hours as needed (has not needed). Reports shoulder is itchy but not painful.    COPD: Current medications:  Proventil BID (she has been using this has her control by mistake), albuterol Nebs every morning (helps with congestion) and Dulera BID.    Started on doxycycline 100 mg BID (started last week) with directions to take for 10 days - will be done on Thursday.  Feels the antibiotic has been really helpful; no longer has productive cough.  She has had the ruins since being on antibiotics - was on azithromycin prior to doxycycline.  Feels her symptoms are improving.  Drinking a lot of water and boost supplements.    Does rinse after using the Dulera.    Using oxygen throughout most of the day.  Is trying to get concentrator so she has more mobility.   Pt is not experiencing side effects.   Pt reports the following symptoms: none at this time  Pt does not have an COPD Action Plan on file.   Has spirometry been completed: Yes   Followed by Dr. Courtney.    CLL: Getting Immune Globulin infusions every month.  Followed by Oncology.    Hyperlipidemia: Current therapy includes atorvastatin 20 mg once daily.  Pt reports no significant myalgias or other side effects.  The ASCVD Risk score ([place] GAB Jr., et al., 2013) failed to calculate for the following reasons:     The 2013 ASCVD risk score is only valid for ages 40 to 79    The patient has a prior MI or stroke diagnosis    Hypothyroidism: Patient is taking levothyroxine 75 mcg daily. Patient is having the following symptoms: hypothyroidism -  fatigue.   TSH   Date Value Ref Range Status   10/15/2019 0.45 0.40 - 4.00 mU/L Final       Anxiety:  Current medications include: Sertraline 50 mg once daily. Pt reports that depression symptoms are worsened - son was admitted for stroke and found to have cranial tumor.  He is in [place] and alone.  She is putting her worries in God's hands.  No flowsheet data found.     Insomnia: Current medications include: trazodone 50 mg. Pt reports no issues. Able to fall back asleep if she does wake.  Was taking two tablets but felt more groggy the next day.  Still naps during the day.       Today's Vitals: There were no vitals taken for this visit. phone visit      ASSESSMENT:                  Medication Adherence: excellent, no issues identified    Shoulder/Pelvis fracture:  Stable    COPD: improved.  Will continue with antibiotic as prescribed.      CLL:  Her provider has discussed Shingrix with her but advised not to get it at this time due to Immune Globulin treatment.    Hyperlipidemia: stable    Hypothyroidism: stable    Anxiety:  Stable considering recent family stress.  She will continue on current regimen and with prayer.    Insomnia: stable  PLAN:                  1.  Complete course of doxycycline  2.  Recommend fluids and yogurt to minimize GI upset and risk of hydration      I spent 30 minutes with this patient today. All changes were made via collaborative practice agreement with PCP. A copy of the visit note was provided to the patient's primary care provider.     Will follow up in 2 months.    The patient declined a summary of these recommendations as an after visit summary.    Yuliana Cast , Pharm D  [phone] (phone)  [phone] (pager)  Medication Therapy Management  Pharmacist

## 2020-02-07 NOTE — PROGRESS NOTES
Contacted [facility] and learned that Dr Hutchins had written an Rx for the POC back in October.     LM for the Nursing line to see if a new Rx can be submitted or if patient would need to make an appointment with Dr Hutchins.

## 2020-02-11 ENCOUNTER — PATIENT OUTREACH (OUTPATIENT)
Dept: CARE COORDINATION | Facility: CLINIC | Age: 85
End: 2020-02-11

## 2020-02-11 NOTE — PROGRESS NOTES
At 10:10am, Marie returned my phone call.    Explained to Marie what I have learned from calling [facility] and [employer].  Marie was willing to schedule another follow up with the Lung doctor.    Contacted [facility] and scheduled an appointment for 02/12/20 at 11:30am with Dr Olivia Rodriguez at [facility].  Requested Marie to contact me after her appointment, otherwise I'll plan to follow up with her next week.    ______________________  Next Outreach: 02/17/20  Planned Outreach Frequency: Monthly  Preferred Phone Number: [phone]    Last Assessment Date: 05/16/19  Care Plan Completion Date: 05/16/19  ______________________

## 2020-02-11 NOTE — PROGRESS NOTES
Community Health Worker called and left a message for the patient. If the patient is returning my call, please transfer the patient to [facility] at [phone].    Plan  I had spoken to JEROMY Pedro, from [facility] who reached out to Camilo ROGERS to inquire on why her POC wasn't filled.  Camilo ROGERS stated that there was not enough supporting documents as to why the POC was needed.  However, I had clarified with Mayela that her oxygen company is through [employer] who had stated that her Rx needed to be re-written and also stated there were not enough supporting documents.  Mayela stated that she spoke with Dr Hutchnis regarding this and he's requesting Marie to be seen asap by any of the pulmonary doctors in order to get a new Rx and supporting documents.  Mayela states that Dr Hutchins only offices at the [facility], but she's welcomed to see any other providers to be seen sooner.  1.  Assist Marie with scheduling an appointment at [facility].    ______________________  Next Outreach: 02/14/20  Planned Outreach Frequency: Monthly  Preferred Phone Number: [phone]    Last Assessment Date: 05/16/19  Care Plan Completion Date: 05/16/19  ______________________

## 2020-02-12 ENCOUNTER — TRANSFERRED RECORDS (OUTPATIENT)
Dept: HEALTH INFORMATION MANAGEMENT | Facility: CLINIC | Age: 85
End: 2020-02-12

## 2020-02-17 ENCOUNTER — TELEPHONE (OUTPATIENT)
Dept: CARE COORDINATION | Facility: CLINIC | Age: 85
End: 2020-02-17

## 2020-02-17 DIAGNOSIS — C67.9 MALIGNANT NEOPLASM OF URINARY BLADDER, UNSPECIFIED SITE (H): Primary | ICD-10-CM

## 2020-02-17 DIAGNOSIS — J20.9 ACUTE BRONCHITIS, UNSPECIFIED ORGANISM: ICD-10-CM

## 2020-02-17 RX ORDER — AZITHROMYCIN 250 MG/1
TABLET, FILM COATED ORAL
Qty: 6 TABLET | Refills: 0 | Status: SHIPPED | OUTPATIENT
Start: 2020-02-17 | End: 2020-04-16

## 2020-02-17 NOTE — TELEPHONE ENCOUNTER
"Patient calling.  States she is coughing up greenish/yellow mucous that is very thick.  RN CC did note very wet sounding cough.  Patient states when she has the coughing bouts she usually has to do some deep breathing to \"get my breath back\".  If that doesn't work she is taking nebs.    She denies fever or any other symptoms.    Patient is requesting z lolita.  Please advise. Thank you.    Babs Garcia RN  Care Coordination  Phone:  [phone]  Email: [email or web address]  [facility], [place] and [facility]        "

## 2020-02-19 NOTE — PROGRESS NOTES
Community Health Worker called and left a message for the patient. If the patient is returning my call, please transfer the patient to [facility] at [phone].    Plan  Did Marie see Dr Rodriguez at [facility]?    ______________________  Next Outreach: 03/10/20  Planned Outreach Frequency: Monthly  Preferred Phone Number: [phone]    Last Assessment Date: 05/16/19  Care Plan Completion Date: 05/16/19  ______________________

## 2020-02-19 NOTE — PROGRESS NOTES
Marie returned my phone call.  Informed me that she did attend her appointment with Dr Rodriguez on 02/12.  She states that they did write her a new Rx for the POC.  However, she hasn't heard back from [facility].  Encouraged Marie to try calling [facility] to get an update on Rx.  However, if she runs into any issues to give me a call back.  Patient understood and agreed to this plan.    ______________________  Next Outreach: 03/24/20  Planned Outreach Frequency: Monthly  Preferred Phone Number: [phone]    Last Assessment Date: 05/16/19  Care Plan Completion Date: 05/16/19  ______________________

## 2020-03-01 ENCOUNTER — HEALTH MAINTENANCE LETTER (OUTPATIENT)
Age: 85
End: 2020-03-01

## 2020-03-02 ENCOUNTER — TELEPHONE (OUTPATIENT)
Dept: PHARMACY | Facility: CLINIC | Age: 85
End: 2020-03-02

## 2020-03-02 NOTE — TELEPHONE ENCOUNTER
Patient called to notify us that oxygen company received needed documentation and now are waiting on coverage determination.      Completed course of Zpak but still has productive cough.  Scheduled with Dr. Anderson on Thursday.  She will contact the clinic if symptoms get worse before then.

## 2020-03-04 ENCOUNTER — TRANSFERRED RECORDS (OUTPATIENT)
Dept: HEALTH INFORMATION MANAGEMENT | Facility: CLINIC | Age: 85
End: 2020-03-04

## 2020-03-05 ENCOUNTER — ANCILLARY PROCEDURE (OUTPATIENT)
Dept: GENERAL RADIOLOGY | Facility: CLINIC | Age: 85
End: 2020-03-05
Attending: INTERNAL MEDICINE
Payer: MEDICARE

## 2020-03-05 ENCOUNTER — OFFICE VISIT (OUTPATIENT)
Dept: INTERNAL MEDICINE | Facility: CLINIC | Age: 85
End: 2020-03-05
Payer: MEDICARE

## 2020-03-05 VITALS
BODY MASS INDEX: 19.78 KG/M2 | DIASTOLIC BLOOD PRESSURE: 66 MMHG | OXYGEN SATURATION: 89 % | TEMPERATURE: 97.6 F | RESPIRATION RATE: 16 BRPM | HEART RATE: 65 BPM | SYSTOLIC BLOOD PRESSURE: 111 MMHG | HEIGHT: 59 IN | WEIGHT: 98.1 LBS

## 2020-03-05 DIAGNOSIS — J44.1 CHRONIC OBSTRUCTIVE PULMONARY DISEASE WITH ACUTE EXACERBATION (H): ICD-10-CM

## 2020-03-05 DIAGNOSIS — J44.1 CHRONIC OBSTRUCTIVE PULMONARY DISEASE WITH ACUTE EXACERBATION (H): Primary | ICD-10-CM

## 2020-03-05 DIAGNOSIS — R19.7 DIARRHEA, UNSPECIFIED TYPE: ICD-10-CM

## 2020-03-05 DIAGNOSIS — F41.1 GAD (GENERALIZED ANXIETY DISORDER): ICD-10-CM

## 2020-03-05 PROCEDURE — 99214 OFFICE O/P EST MOD 30 MIN: CPT | Performed by: INTERNAL MEDICINE

## 2020-03-05 PROCEDURE — 71046 X-RAY EXAM CHEST 2 VIEWS: CPT

## 2020-03-05 RX ORDER — CODEINE PHOSPHATE AND GUAIFENESIN 10; 100 MG/5ML; MG/5ML
1-2 SOLUTION ORAL EVERY 8 HOURS PRN
Qty: 118 ML | Refills: 0 | Status: SHIPPED | OUTPATIENT
Start: 2020-03-05 | End: 2020-06-22

## 2020-03-05 RX ORDER — PREDNISONE 10 MG/1
TABLET ORAL
Qty: 15 TABLET | Refills: 0 | Status: SHIPPED | OUTPATIENT
Start: 2020-03-05 | End: 2020-06-22

## 2020-03-05 ASSESSMENT — ENCOUNTER SYMPTOMS
HEADACHES: 0
SORE THROAT: 0
WHEEZING: 1
CHILLS: 0
SHORTNESS OF BREATH: 1
COUGH: 1
FEVER: 0
NERVOUS/ANXIOUS: 1

## 2020-03-05 ASSESSMENT — MIFFLIN-ST. JEOR: SCORE: 785.61

## 2020-03-05 NOTE — PROGRESS NOTES
Subjective     Marie Kline is a 87 year old female who presents to clinic today for the following health issues:    HPI   Patient is here with symptoms of significant cough and chest congestion.  Patient has known diagnosis of COPD and takes Dulera with albuterol inhaler as needed.  Patient also has albuterol nebulizer.  Patient use it 1-2 times a day as needed.  Patient was treated with azithromycin and doxycycline and still has significant thick sputum which is difficult to bring up.  Patient is not on any cough medicine.  Denies fever, chills.  Patient is on home oxygen at 2 L.  Patient also complains of diarrhea for last 2 to 3 weeks after completing azithromycin.  Denies foul-smelling.  Denies blood in the stool.    Patient has anxiety and was on Zoloft.  According to the patient he was stopped and patient is undergoing a lot of stress in her son is diagnosed with melanoma was in [place] and patient feels more anxious.  Daughter wanted to check if Zoloft could be started again.    Patient Active Problem List   Diagnosis     Acute and chronic respiratory failure with hypercapnia (HCC)     Nonischemic cardiomyopathy (H)     Pneumonia     Acute myocardial infarction, initial episode of care (HCC)     CLL (chronic lymphocytic leukemia) (HCC)     CHF (congestive heart failure) (HCC)     Cardiomyopathy in other diseases classified elsewhere (HCC)     Hyperlipidemia with target LDL less than 130     Health Care Home     COPD exacerbation (H)     LESLY (generalized anxiety disorder)     Hypothyroidism     Back pain with radiation     DDD (degenerative disc disease), lumbar     Splenomegaly     Lumbar degenerative disc disease     Lumbar foraminal stenosis     Central spinal stenosis     Bladder cancer (H)     Sepsis (H)     Senile osteoporosis     CKD (chronic kidney disease) stage 3, GFR 30-59 ml/min (H)     Purpura senilis (H)     Xerosis cutis     Malignant neoplasm of urinary bladder, unspecified site (H)      Hypoxia     Femoral neck fracture (H)     S/P total hip arthroplasty     NSTEMI (non-ST elevated myocardial infarction) (H)     Stress-induced cardiomyopathy     COPD with acute exacerbation (H)     Shoulder fracture     Mouth sores     Closed nondisplaced fracture of pelvis with routine healing, unspecified part of pelvis, subsequent encounter     Pelvic hematoma in female     Closed head injury, subsequent encounter     Multiple skin tears     Pneumonia of left lung due to infectious organism, unspecified part of lung     Chest pain     Coronary artery disease involving native heart with angina pectoris, unspecified vessel or lesion type (H)     Past Surgical History:   Procedure Laterality Date     BIOPSY LYMPH NODE INGUINAL Right 10/23/2018    Procedure: excsional biopsy right inguinal lymph node;  Surgeon: Reji Connors MD;  Location:  OR     BLADDER SURGERY       CORONARY ANGIOGRAPHY ADULT ORDER  10/2014    minimal CAD     CV LEFT HEART CATH N/A 12/11/2018    Procedure: Left Heart Cath;  Surgeon: Sadiq Carrasco MD;  Location:  HEART CARDIAC CATH LAB     CYSTOSCOPY       CYSTOSCOPY, BIOPSY BLADDER, COMBINED N/A 2/9/2016    Procedure: COMBINED CYSTOSCOPY, BIOPSY BLADDER;  Surgeon: Kar Nuñez MD;  Location:  OR     CYSTOSCOPY, TRANSURETHRAL RESECTION (TUR) TUMOR BLADDER, COMBINED N/A 2/9/2016    Procedure: COMBINED CYSTOSCOPY, TRANSURETHRAL RESECTION (TUR) TUMOR BLADDER;  Surgeon: Kar Nuñez MD;  Location:  OR     ESOPHAGOSCOPY, GASTROSCOPY, DUODENOSCOPY (EGD), COMBINED N/A 6/22/2016    Procedure: COMBINED ESOPHAGOSCOPY, GASTROSCOPY, DUODENOSCOPY (EGD);  Surgeon: Freddie Diez MD;  Location:  GI     OPEN REDUCTION INTERNAL FIXATION HIP BIPOLAR Left 11/19/2018    Procedure: Left bipolar hemiarthroplasty;  Surgeon: Scar Reynolds MD;  Location:  OR       Social History     Tobacco Use     Smoking status: Former Smoker     Types: Cigarettes     Last  attempt to quit: 2/3/1996     Years since quittin.1     Smokeless tobacco: Never Used   Substance Use Topics     Alcohol use: No     Alcohol/week: 0.0 standard drinks     Family History   Problem Relation Age of Onset     Cerebrovascular Disease Mother      Cancer Father          Current Outpatient Medications   Medication Sig Dispense Refill     albuterol (PROVENTIL HFA) 108 (90 Base) MCG/ACT inhaler Inhale 2 puffs into the lungs every 6 hours as needed for shortness of breath / dyspnea or wheezing       albuterol (PROVENTIL) (2.5 MG/3ML) 0.083% neb solution Take 1 vial (2.5 mg) by nebulization every 6 hours as needed for shortness of breath / dyspnea or wheezing 1 Box 3     aspirin 81 MG EC tablet Take 81 mg by mouth daily       atorvastatin (LIPITOR) 20 MG tablet Take 1 tablet (20 mg) by mouth daily 90 tablet 4     calcium-vitamin D-vitamin K (VIACTIV) 500-500-40 MG-UNT-MCG CHEW Take 1 tablet by mouth daily       ferrous sulfate (IRON) 325 (65 FE) MG tablet Take 325 mg by mouth daily (with breakfast)        furosemide (LASIX) 20 MG tablet Take 1 tablet (20 mg) by mouth daily 90 tablet 1     gabapentin (NEURONTIN) 300 MG capsule Take 1 capsule (300 mg) by mouth every 12 hours (Patient taking differently: Take 300 mg by mouth every 12 hours Self started 20 taking One tablet in the morning and one tablet at night because wasn't feeling well) 180 capsule 0     guaiFENesin-codeine (ROBITUSSIN AC) 100-10 MG/5ML solution Take 5-10 mLs by mouth every 8 hours as needed for cough 118 mL 0     Immune Globulin, Human, (OCTAGAM) 5 GM/100ML SOLN 300 mg/kg into the vein every 30 days    Hold this medication while in TCU-resume at discharge from TCU       levothyroxine (SYNTHROID/LEVOTHROID) 75 MCG tablet Take 1 tablet (75 mcg) by mouth daily 90 tablet 4     metoprolol succinate ER (TOPROL-XL) 25 MG 24 hr tablet Take 1 tablet (25 mg) by mouth daily 90 tablet 4     mometasone-formoterol (DULERA) 200-5 MCG/ACT inhaler  "Inhale 2 puffs into the lungs 2 times daily       order for DME Equipment being ordered: Oxygen concentrator, O2-85% resting room air & 02-82% walking room air, Flow rate 2L 1 each 0     order for DME Equipment being ordered: Oxygen 2LNC continuous with portability tank due to mobility in the home.     Length of need is 99 months.       predniSONE (DELTASONE) 10 MG tablet Take 2 tab daily for 5 days and 1 tab daily for next 5 days 15 tablet 0     sertraline (ZOLOFT) 50 MG tablet Take 1 tablet (50 mg) by mouth daily 90 tablet 1     traZODone (DESYREL) 50 MG tablet Take 1 tablet (50 mg) by mouth At Bedtime 90 tablet 1     ACETAMINOPHEN PO Take 1,000 mg by mouth every 8 hours And every 8 hours as needed       doxycycline hyclate (VIBRA-TABS) 100 MG tablet Take 1 tablet (100 mg) by mouth 2 times daily (Patient not taking: Reported on 3/5/2020) 20 tablet 0     sodium chloride (OCEAN) 0.65 % nasal spray Spray 1 spray into both nostrils every hour as needed for congestion       Allergies   Allergen Reactions     Diagnostic X-Ray Materials Hives     CT DYE     Contrast Dye Hives     CT DYE     Prolia [Denosumab]      Osteonecrosis jaw       Wound Dressing Adhesive        Reviewed and updated as needed this visit by Provider  Problems       Review of Systems   Constitutional: Negative for chills and fever.   HENT: Positive for congestion. Negative for sore throat.    Respiratory: Positive for cough, shortness of breath and wheezing.    Neurological: Negative for headaches.   Psychiatric/Behavioral: The patient is nervous/anxious.           Objective    /66 (BP Location: Right arm, Patient Position: Sitting, Cuff Size: Adult Regular)   Pulse 65   Temp 97.6  F (36.4  C) (Oral)   Resp 16   Ht 1.499 m (4' 11\")   Wt 44.5 kg (98 lb 1.6 oz)   SpO2 (!) 89%   BMI 19.81 kg/m    Body mass index is 19.81 kg/m .  Physical Exam  HENT:      Head: Normocephalic and atraumatic.      Mouth/Throat:      Mouth: Mucous membranes " are moist.   Neck:      Musculoskeletal: Neck supple.   Cardiovascular:      Rate and Rhythm: Normal rate and regular rhythm.      Pulses: Normal pulses.   Pulmonary:      Breath sounds: Wheezing present.      Comments: Diminished breat sounds  Neurological:      Mental Status: She is alert.   Psychiatric:         Mood and Affect: Mood normal.        Assessment & Plan     Marie was seen today for copd.    Diagnoses and all orders for this visit:    Chronic obstructive pulmonary disease with acute exacerbation (H)  -     XR Chest 2 Views; Future  -     guaiFENesin-codeine (ROBITUSSIN AC) 100-10 MG/5ML solution; Take 5-10 mLs by mouth every 8 hours as needed for cough  -     predniSONE (DELTASONE) 10 MG tablet; Take 2 tab daily for 5 days and 1 tab daily for next 5 days    Diarrhea, unspecified type    LESLY (generalized anxiety disorder)  -     sertraline (ZOLOFT) 50 MG tablet; Take 1 tablet (50 mg) by mouth daily    Will order x-ray and give a trial of cough medicine and prednisone.  Will hold off on antibiotics as she already completed 2 rounds of antibiotic.  Will restart Zoloft.  Advised to monitor her diarrhea and call us if is not getting better.  Encouraged to try probiotics and yogurt.      Allie Anderson MD  [facility]

## 2020-03-05 NOTE — NURSING NOTE
"/66 (BP Location: Right arm, Patient Position: Sitting, Cuff Size: Adult Regular)   Pulse 65   Temp 97.6  F (36.4  C) (Oral)   Resp 16   Ht 1.499 m (4' 11\")   Wt 44.5 kg (98 lb 1.6 oz)   SpO2 (!) 89%   BMI 19.81 kg/m      "

## 2020-03-05 NOTE — PATIENT INSTRUCTIONS
Patient Education     COPD Flare    You have had a flare-up of your COPD.  COPD (chronic obstructive pulmonary disease) is a common lung disease. It causes your airways to get irritated and narrower. This makes it harder for you to breathe. Emphysema and chronic bronchitis are both types of COPD. This is a long-term (chronic) condition. This means you always have it. Sometimes it gets worse. When this happens, it is called a flare-up.  Symptoms of COPD  People with COPD may have symptoms most of the time. In a flare-up, your symptoms get worse. These symptoms may mean you are having a flare-up:    Shortness of breath, shallow or rapid breathing, or wheezing that gets worse    Lung infection    Cough that gets worse    More mucus, thicker mucus or mucus of a different color    Tiredness, less energy, or trouble doing your normal activities    Fever    Chest tightness    Your symptoms don t get better even when you use your normal medicines, inhalers, and nebulizer    Trouble talking    You feel confused  Causes of flare-ups  Unfortunately, a flare-up can happen even if you did everything right. And even if you followed your healthcare provider s instructions. Some causes of flare-ups are:    Smoking or secondhand smoke    Colds, the flu, or respiratory infections    Air pollution    Sudden change in the weather    Dust, irritating chemicals, or strong fumes    Not taking your medicines as prescribed  Home care  Here are some things you can do at home to treat a flare-up:    Try not to panic. This makes it harder to breathe, and keeps you from doing the right things.    Don t smoke or be around others who are smoking.    Try to drink more fluids than normal during a flare-up, unless your healthcare provider has told you not to because of heart and kidney problems. More fluids can help loosen the mucus.    Use your inhalers and nebulizer, if you have one, as you have been told to.    If you were given antibiotics, take  them until they are used up or your provider tells you to stop. It s important to finish the antibiotics, even though you feel better. This will make sure the infection has cleared.    If you were given prednisone or another steroid, finish it even if you feel better.  Preventing a flare-up  Flare-ups happen. But the best way to treat one is to prevent it before it starts. Here are some pointers:    Don t smoke or be around others who are smoking.    Take your medicines as discussed with your healthcare provider.    Talk with your provider about getting a flu shot every year. Also find out if you need a pneumonia shot.    If there is a weather advisory warning to stay indoors, try to stay inside when possible.    Try to eat healthy, exercise, and get plenty of sleep.    Try to stay away from things that normally set you off. These include dust, chemical fumes, hairsprays, or strong perfumes.  Follow-up care  Follow up with your healthcare provider, or as advised.  If a culture was done, you will be told if your treatment needs to be changed. You can call as directed for the results.  If X-rays were done, you will be told of any new findings that may affect your care.  Call 911  Call 911 if any of these occur:    You have trouble breathing    You feel confused or it s hard to wake you up    You faint or lose consciousness    You have a rapid heart rate    You have new pain in your chest, arm, shoulder, neck, or upper back  When to seek medical advice  Call your healthcare provider right away if any of these occur:    Wheezing or shortness of breath gets worse    You need to use your inhalers more often than normal without relief    Fever of 100.4 F (38 C) or higher, or as directed by your healthcare provider    Coughing up lots of dark-colored or bloody mucus (sputum)    Chest pain with each breath    You don't start to get better within 24 hours    Swelling of your ankles gets worse    Dizziness or weakness  Date  Last Reviewed: 9/1/2016 2000-2019 [employer]. [address] [place] [address], [place] [address]. All rights reserved. This information is not intended as a substitute for professional medical care. Always follow your healthcare professional's instructions.

## 2020-03-10 ENCOUNTER — TELEPHONE (OUTPATIENT)
Dept: INTERNAL MEDICINE | Facility: CLINIC | Age: 85
End: 2020-03-10

## 2020-03-10 ENCOUNTER — MEDICAL CORRESPONDENCE (OUTPATIENT)
Dept: HEALTH INFORMATION MANAGEMENT | Facility: CLINIC | Age: 85
End: 2020-03-10

## 2020-03-13 ENCOUNTER — PATIENT OUTREACH (OUTPATIENT)
Dept: CARE COORDINATION | Facility: CLINIC | Age: 85
End: 2020-03-13

## 2020-03-13 NOTE — PROGRESS NOTES
Clinic Care Coordination Contact  [facility]/Voicemail       Clinical Data: Care Coordinator Outreach  Outreach attempted x 1.  Left message on patient's voicemail with call back information and requested return call.  Plan:  Care Coordinator will try to reach patient again in 3-5 business days.    Babs Garcia RN  Care Coordination  Phone:  [phone]  Email: [email or web address], [place] and [facility]

## 2020-03-16 NOTE — PROGRESS NOTES
"Clinic Care Coordination Contact    Follow Up Progress Note      Assessment: Patient is feeling at baseline.  No new complaints.  She spoke about how [facility] is \"on lock down\".  Residents have been urged to stay confined to their apartments.  Social activities have been canceled.    Patient states she has a friend, \"adopted son\" who is taking good care of her. He makes sure she has groceries and other supplies.    Patient states she still has cough that is productive. Taking medications, including prednisone as prescribed.  No new complains.      Goals addressed this encounter:   Goals Addressed                 This Visit's Progress      COMPLETED: baseline health        Goal Statement: I will get back my independence.  Measure of Success: I will not have any pain.  I will be at baseline activity level.  I will not have any ED/IP admissions related to chronic health conditions.    Supportive Steps to Achieve: Patient to take medications as prescribed.  Patient will follow up with providers as recommended.  Patient will follow home care recommendations.  Patient will be supported by RN CC and community paramedic.  Barriers: back pain, not managed well.  Strengths: Patient is able to advocate for self.  Willing to accept recommendations and feedback.  Date to Achieve By: 1 dec 2019  Patient expressed understanding of goal: yes.    As of today's date 3/16/2020 goal is met at 76 - 100%.   Goal Status:  Complete  Patient is doing really well. No new ED/IP admissions.  Happy with support provided by Care coordination team.  Community paramedic no longer providing visits.  As of today's date 11/22/2019 goal is met at 51 - 75%.   Goal Status:  Showing progress  Patient has been working on getting a portable oxygen concentrator, so that she's not \"quaranteened to her apartment\"    As of today's date 10/22/2019 goal is met at 51 - 75%.   Goal Status:  Ongoing  Patient doesn't feel any pain anymore, however, wants to work " "on not having to rely on her oxygen anymore.    As of today's date 10/17/2019 goal is met at 51 - 75%.   Goal Status:  Ongoing  Patient was discharged from home care services on 10/14.  Patient had pcp f/u on 10/15.  Patient now wearing chronic O2.                paint (pt-stated)   On track     Goal Statement: I will be able to paint my birds again!  Date goal set: 10/23/19   Measure of Success: Patient will have decreased tremors that prohibit her from painting.  Barriers: Tremors/dizziness  Strengths: positive attitude.  Good support system.  Social. Caty.  Date to Achieve By: 1 may 2020  Patient expressed understanding of goal: yes    Action steps to achieve this goal  1. I will speak to MTM about SE of medications   2. I will f/u with provider as recommended.  3. I will stay active and social.    As of today's date 3/16/2020 goal is met at 51 - 75%.   Goal Status:  Showing progress  Patient has recently increased social activities, but now has cut back completely due to \"lock down\" at [facility].  RN CC will continue to follow patient closely.    As of today's date 11/22/2019 goal is met at 26 - 50%.   Goal Status:  Ongoing  Talked to Dr Kiser about this, but stated she did not give any new Rx's for this. Schedule to see Neulogist on 11/26/19.               Intervention/Education provided during outreach: patient encouraged to wash hands frequently.  Avoid touching her face. And Avoid large gatherings.     Outreach Frequency: 6 weeks    Plan:   No new complaints.  RN CC will continue to follow.    Babs Garcia RN  Care Coordination  Phone:  [phone]  Email: [email or web address]  [facility], [place], [place] and [facility]        "

## 2020-03-24 ENCOUNTER — PATIENT OUTREACH (OUTPATIENT)
Dept: CARE COORDINATION | Facility: CLINIC | Age: 85
End: 2020-03-24

## 2020-03-24 NOTE — PROGRESS NOTES
Clinic Care Coordination Contact  [facility]/Voicemail       Clinical Data: Care Coordinator Outreach  Outreach attempted x 1.  Left message on patient's voicemail with call back information and requested return call.    Plan:  Care Coordinator will try to reach patient again in 10 business days.  CHW will assess if patient heard back regarding her POC.    KOREY Lawrence  Clinic Care Coordination  [facility] Clinics : [place], [place], [place], and [place]  Phone: [phone]    ______________________  Next Outreach: 04/07/20  Planned Outreach Frequency: Monthly  Preferred Phone Number: [phone]    Last Assessment Date: 05/16/19  Care Plan Completion Date: 05/16/19  ______________________

## 2020-04-07 ENCOUNTER — PATIENT OUTREACH (OUTPATIENT)
Dept: CARE COORDINATION | Facility: CLINIC | Age: 85
End: 2020-04-07

## 2020-04-07 NOTE — PROGRESS NOTES
"Clinic Care Coordination Contact    Follow Up Progress Note   Spoke to Marie    Discussed the Following:  Painting birds again    Marie states that it's been hard for her to get back to painting again because of her tremors.    However, patient is hopeful to utilize this time of qaurantine to  her hobbies again to keep her mind and hands busy.    Marie states that she did sew some clothes for her granddaughters Sirisha huertas and she reports that her granddaughter absolutely loved them.    It was hard to keep a steady hand, however, patient states that she got through it and is planning to make some Tea towels for another granddaughters birthday.    CHW encouraged patient to use this time to get back to her hobbies that she's been wanting to do.    Goals addressed this encounter:   Goals Addressed                 This Visit's Progress      paint (pt-stated)        Goal Statement: I will be able to paint my birds again!  Date goal set: 10/23/19   Measure of Success: Patient will have decreased tremors that prohibit her from painting.  Barriers: Tremors/dizziness  Strengths: positive attitude.  Good support system.  Social. Caty.  Date to Achieve By: 1 may 2020  Patient expressed understanding of goal: yes    Action steps to achieve this goal  1. I will speak to MTM about SE of medications   2. I will f/u with provider as recommended.  3. I will stay active and social.    As of today's date 4/7/2020 goal is met at 51 - 75%.   Goal Status:  Ongoing  As of today's date 3/16/2020 goal is met at 51 - 75%.   Goal Status:  Showing progress  Patient has recently increased social activities, but now has cut back completely due to \"lock down\" at [facility].  RN CC will continue to follow patient closely.    As of today's date 11/22/2019 goal is met at 26 - 50%.   Goal Status:  Ongoing  Talked to Dr Kiser about this, but stated she did not give any new Rx's for this. Schedule to see Neulogist on 11/26/19.             " "  Intervention/Education provided during outreach:   Marie states that she was supposed to have her leukemia treatment on 04/01, but needed to reschedule to 04/15.  Due to clinic limiting appointments due to Covid and concerns that Marie is more susceptible to catching it.  Marie states that she can't wait to get her treatment as she states that it will boost her immune system.    Marie then shared a strange experience that she had yesterday around 6pm.  She stated that she had gone down stairs to grab her mail and she suddenly became very hot and sweaty.  She stated that her stomach also started feeling very weird.  She described her stomach being in pain.  However, patient stated that because she was getting so hot, she decided to sit in her chair and started eating some ice cream to cool herself down which made her feel a lot better.  CHW asked patient if she had happened to take her temperature at that time.  However, patient states that she didn't have a thermometer, she believes that the home care nurse may have mistakenly taken it.    Marie had stated that maybe she should have written down her experience to keep track of it.  CHW encouraged that wouldn't be a bad idea in case it happens again.  Also stated that it was a good idea and quick thinking of her to cool her body temperature down with ice cream.  I also mentioned maybe getting another thermometer so that she can also check to make sure that she's not getting a fever.  Marie states that she did speak to her daughter after that had happened and her daughter will try to order a thermometer through [employer] to send to her.  Patient requested for me to inform DIMA Mejia RN, of this experience.  She stated that a follow up is not needed unless if Babs had any concerns, she is free to call.  \"Babs has been my mukul!\"     Plan:   CHW will attach DIMA Brice RN, to this encounter    CHW will follow up in 1 month.    Richard Zelaya, KOREY  Clinic Care " [facility] Clinics : [place], [place], [place], and [place]  Phone: [phone]    ______________________  Next Outreach:  05/07/20  Planned Outreach Frequency: Monthly  Preferred Phone Number: [phone]    Last Assessment Date: 05/16/19  Care Plan Completion Date: 05/16/19  ______________________

## 2020-04-16 ENCOUNTER — ALLIED HEALTH/NURSE VISIT (OUTPATIENT)
Dept: PHARMACY | Facility: CLINIC | Age: 85
End: 2020-04-16
Payer: COMMERCIAL

## 2020-04-16 DIAGNOSIS — F41.1 GAD (GENERALIZED ANXIETY DISORDER): ICD-10-CM

## 2020-04-16 DIAGNOSIS — F51.01 PRIMARY INSOMNIA: ICD-10-CM

## 2020-04-16 DIAGNOSIS — S72.002D CLOSED FRACTURE OF NECK OF LEFT FEMUR WITH ROUTINE HEALING, SUBSEQUENT ENCOUNTER: Primary | ICD-10-CM

## 2020-04-16 DIAGNOSIS — E03.9 HYPOTHYROIDISM, UNSPECIFIED TYPE: ICD-10-CM

## 2020-04-16 DIAGNOSIS — J44.1 COPD WITH ACUTE EXACERBATION (H): ICD-10-CM

## 2020-04-16 DIAGNOSIS — C91.10 CLL (CHRONIC LYMPHOCYTIC LEUKEMIA) (H): ICD-10-CM

## 2020-04-16 DIAGNOSIS — E78.5 HYPERLIPIDEMIA WITH TARGET LDL LESS THAN 130: ICD-10-CM

## 2020-04-16 PROCEDURE — 99606 MTMS BY PHARM EST 15 MIN: CPT | Performed by: PHARMACIST

## 2020-04-16 PROCEDURE — 99607 MTMS BY PHARM ADDL 15 MIN: CPT | Performed by: PHARMACIST

## 2020-04-16 ASSESSMENT — ANXIETY QUESTIONNAIRES
1. FEELING NERVOUS, ANXIOUS, OR ON EDGE: NOT AT ALL
6. BECOMING EASILY ANNOYED OR IRRITABLE: NOT AT ALL
5. BEING SO RESTLESS THAT IT IS HARD TO SIT STILL: NOT AT ALL
IF YOU CHECKED OFF ANY PROBLEMS ON THIS QUESTIONNAIRE, HOW DIFFICULT HAVE THESE PROBLEMS MADE IT FOR YOU TO DO YOUR WORK, TAKE CARE OF THINGS AT HOME, OR GET ALONG WITH OTHER PEOPLE: NOT DIFFICULT AT ALL
GAD7 TOTAL SCORE: 0
7. FEELING AFRAID AS IF SOMETHING AWFUL MIGHT HAPPEN: NOT AT ALL
3. WORRYING TOO MUCH ABOUT DIFFERENT THINGS: NOT AT ALL
2. NOT BEING ABLE TO STOP OR CONTROL WORRYING: NOT AT ALL

## 2020-04-16 ASSESSMENT — PATIENT HEALTH QUESTIONNAIRE - PHQ9: 5. POOR APPETITE OR OVEREATING: NOT AT ALL

## 2020-04-16 NOTE — PROGRESS NOTES
MTM ENCOUNTER  SUBJECTIVE/OBJECTIVE:                           Marie Kline is a 87 year old female called for a follow-up visit. She was referred to me from Dr. Kiser.  Today's visit is a follow-up MTM visit from 2/3/2020.     Patient consented to a telehealth visit: yes    Chief Complaint: no concerns    Allergies/ADRs: Reviewed in Epic  Tobacco:  reports that she quit smoking about 24 years ago. Her smoking use included cigarettes. She has never used smokeless tobacco.  Alcohol: not currently using  Caffeine: no caffeine  PMH: Reviewed in Epic    Medication Adherence/Access: no issues reported  The patient fills medications at [facility]: YES.    Shoulder/Pelvis fracture:  Discharged from NH on 9/12/19 after two month stay.  Was in a sling for 7 weeks.  Taking gabapentin 300 mg QAM, APAP 1000 mg every 8 hours as needed (has not needed). Has been trying to reduce gabapentin and feel good at this time.    COPD: Current medications:  Proventil BID (using at least once per day), albuterol Nebs as needed (helps with congestion) and Dulera BID and prednisone 5 mg daily (will be done with taper next Thursday).  Does rinse after using the Dulera.    Completed course of prednisone and antibiotics for exacerbation in March.  Sleeps with 2L oxygen.  Pt is not experiencing side effects.   Pt reports the following symptoms: none at this time  Pt does not have an COPD Action Plan on file.   Has spirometry been completed: Yes   Followed by Dr. Courtney.    CLL: Getting Immune Globulin infusions every month - last infusion yesterday.  Was really tired afterward but otherwise no concerns.  Followed by Oncology.    Hyperlipidemia: Current therapy includes atorvastatin 20 mg once daily.  Pt reports no significant myalgias or other side effects.  The ASCVD Risk score (Fallston GAB Jr., et al., 2013) failed to calculate for the following reasons:    The 2013 ASCVD risk score is only valid for ages 40 to 79    The patient has a prior MI or  stroke diagnosis    Recent Labs   Lab Test 05/01/19  1144 03/12/18  1009  10/09/14  0350   CHOL 161 151   < > 167   HDL 45* 47*   < > 55   LDL 84 84   < > 88   TRIG 160* 98   < > 121   CHOLHDLRATIO  --   --   --  3.0    < > = values in this interval not displayed.     Hypothyroidism: Patient is taking levothyroxine 75 mcg daily. Patient is having the following symptoms: hypothyroidism -  fatigue.   TSH   Date Value Ref Range Status   10/15/2019 0.45 0.40 - 4.00 mU/L Final       Anxiety:  Current medications include: not taking sertraline at this time because she doesn't feel she needs this.  Pt reports that depression/anxiety symptoms have improved.  She is putting her worries in God's hands.  GAD7 score: 0 from today.     Insomnia: Current medications include: trazodone 50 mg. Pt reports no issues. Able to fall back asleep if she does wake.  Occasionally will nap during the day.        Today's Vitals: There were no vitals taken for this visit. phone visit      ASSESSMENT:                              Medication Adherence: excellent, no issues identified    Shoulder/Pelvis fracture:  stable    COPD: improved.  She will complete prednisone as prescribed.  Her provider did not recommend Shingrix at this time due to her allergies.    CLL: stable, follow-up with Oncology as planned.    Hyperlipidemia: stable    Hypothyroidism: stable    Anxiety:  Stable off medication, she prefers not to restart at this time.    Insomnia: stable     PLAN:                            1.  Continue current regimen    I spent 30 minutes with this patient today. All changes were made via collaborative practice agreement with PCP. A copy of the visit note was provided to the patient's primary care provider.    Will follow up in 3 months.    The patient declined a summary of these recommendations.     Yuliana Cast , Pharm D  [phone] (phone)  [phone] (pager)  Medication Therapy Management Pharmacist

## 2020-04-17 ASSESSMENT — ANXIETY QUESTIONNAIRES: GAD7 TOTAL SCORE: 0

## 2020-05-01 DIAGNOSIS — F51.01 PRIMARY INSOMNIA: ICD-10-CM

## 2020-05-01 NOTE — TELEPHONE ENCOUNTER
Pending Prescriptions:                       Disp   Refills    traZODone (DESYREL) 50 MG tablet [Pharmacy*90 tab*0        Sig: Take 2 tablets by mouth At Bedtime    Routing refill request to provider for review/approval because:  Medication is reported/historical

## 2020-05-04 RX ORDER — TRAZODONE HYDROCHLORIDE 50 MG/1
TABLET, FILM COATED ORAL
Qty: 90 TABLET | Refills: 0 | Status: SHIPPED | OUTPATIENT
Start: 2020-05-04 | End: 2020-07-09

## 2020-05-08 ENCOUNTER — PATIENT OUTREACH (OUTPATIENT)
Dept: CARE COORDINATION | Facility: CLINIC | Age: 85
End: 2020-05-08

## 2020-05-08 NOTE — PROGRESS NOTES
Clinic Care Coordination Contact  [facility]/Voicemail       Clinical Data: Care Coordinator Follow UP Outreach  Outreach attempted x 1.  Left message on patient's voicemail with call back information and requested return call.  Plan: Care Coordinator will try to reach patient again in 3-5 business days.    Babs Garcia RN-BSN  Care Coordination  Phone:  [phone]  Email: [email or web address]  [facility], [place] and [facility]

## 2020-05-09 DIAGNOSIS — I50.9 CHRONIC CONGESTIVE HEART FAILURE, UNSPECIFIED HEART FAILURE TYPE (H): ICD-10-CM

## 2020-05-11 RX ORDER — FUROSEMIDE 20 MG
TABLET ORAL
Qty: 90 TABLET | Refills: 1 | Status: SHIPPED | OUTPATIENT
Start: 2020-05-11 | End: 2020-11-11

## 2020-05-11 NOTE — TELEPHONE ENCOUNTER
Pending Prescriptions:                       Disp   Refills    furosemide (LASIX) 20 MG tablet [Pharmacy*90 tab*1            Sig: TAKE ONE TABLET BY MOUTH ONE TIME DAILY     Prescription approved per [facility] Refill Protocol.

## 2020-05-13 ENCOUNTER — PATIENT OUTREACH (OUTPATIENT)
Dept: CARE COORDINATION | Facility: CLINIC | Age: 85
End: 2020-05-13

## 2020-05-13 NOTE — PROGRESS NOTES
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    However, she stated that she still has some canvases and paint, she may be able to try painting landscapes.    Goals:   Goals Addressed as of 5/13/2020 at 12:37 PM                 Today    3/13/20      paint (pt-stated)   60%  On track    Added 10/23/19 by Babs Garcia RN     Goal Statement: I will be able to paint my birds again!  Date goal set: 10/23/19   Measure of Success: Patient will have decreased tremors that prohibit her from painting.  Barriers: Tremors/dizziness  Strengths: positive attitude.  Good support system.  Social. Caty.  Date to Achieve By: 1 may 2020  Patient expressed understanding of goal: yes    Action steps to achieve this goal  1. I will speak to MTM about SE of medications   2. I will f/u with provider as recommended.  3. I will stay active and social.    As of today's date 5/13/2020 goal is met at 51 - 75%.   Goal Status:  Ongoing  As of today's date 4/7/2020 goal is met at 51 - 75%.   Goal Status:  Ongoing  As of today's date 3/16/2020 goal is met at 51 - 75%.   Goal Status:  Showing progress  Patient has recently increased social activities, but now has cut back completely due to \"lock down\" at [facility].  RN CC will continue to follow patient closely.    As of today's date 11/22/2019 goal is met at 26 - 50%.   Goal Status:  Ongoing  Talked to Dr Kiser about this, but stated she did not give any new Rx's for this. Schedule to see Neulogist on 11/26/19.        Discussed the Following:  CHW inquired if patient ever received or heard back regarding her POC.  Patient stated that she has not.  Patient shared that she's basically given up on getting POC from [employer] since it's been so long.  Patient is not in a hurry to get it right now " "anyway due to COVID pandemic and she doesn't really go anywhere.  CHW assured patient that I will keep trying to contact [facility] to see what's going on.  Patient acknowledged appreciation.    CHW questioned how Marie has been doing since last outreach.  Marie shares shes been doing fine, \"hanging in there.\"  She continues to practice self-quarantine and only leaves the house for her infusions.  Patient shared that there's a family-friend who lives about 5 blocks away from her who has been willing to assist her with taking to her appointments and getting groceries for her.    Patient stated that she was knitting to keep herself busy, however, has ran out of yarn.  CHW inquired if patient had thought of asking the gentleman who's been helping her to  some yarn for her.  Patient stated that she hadn't even thought of that, but might be a good idea.  Patient will plan to talk to him.    CHW Next Follow-up: 1 month    CHW Plan: I will plan to follow up with [facility] regarding POC.  (Spoke to Jesi from [facility] [phone], informed her that we've been working with patient on getting a POC for the past couple months and haven't heard anything. Jesi informed me they weren't processing any POC's for the past few months due to COVID and that POC's were expensive so patients would need to qualify. She confirmed she had Marie's orders and notes. She will process order and contact me for an update.)    KOREY Lawrence  Clinic Care Coordination  [facility] Clin[facility] : [place], [place], [place], and [place]  Phone: [phone]    ______________________  Next Outreach:  06/09/20  Planned Outreach Frequency: Monthly  Preferred Phone Number: [phone]    Last Assessment Date: 05/16/19  Care Plan Completion Date: 05/16/19  ______________________              "

## 2020-05-19 ENCOUNTER — PATIENT OUTREACH (OUTPATIENT)
Dept: CARE COORDINATION | Facility: CLINIC | Age: 85
End: 2020-05-19

## 2020-05-19 DIAGNOSIS — J30.89 SEASONAL ALLERGIC RHINITIS DUE TO OTHER ALLERGIC TRIGGER: Primary | ICD-10-CM

## 2020-05-19 RX ORDER — FLUTICASONE PROPIONATE 50 MCG
1 SPRAY, SUSPENSION (ML) NASAL DAILY
Qty: 16 G | Refills: 0 | Status: SHIPPED | OUTPATIENT
Start: 2020-05-19 | End: 2021-01-06

## 2020-05-19 NOTE — LETTER
[facility] CARE COORDINATION  [address]  [place] [facility] [place] [address]    June 4, 2020    Marie Kline  [address] [place]  [place] [place] [address]      Dear Marie,    I have been unsuccessful in reaching you since our last contact. I wanted to let you know that Dr. Kiser has prescribed Flonase for you and should be at your pharmacy to .      I also wanted to inform you that I am leaving my position as care coordinator.  It has been a pleasure to work with you.  I have enjoined our conversations over the last year or so.  Richard will still be available to assist you with the portable concentrator.  Please call her with any questions [phone].      Sincerely,    Babs Garcia, RN-BSN  Care Coordination

## 2020-05-19 NOTE — PROGRESS NOTES
Clinic Care Coordination Contact  [facility]/Voicemail    Received VM from patient requesting an Rx for Flonase.  Patient stated in message that she went to pharmacy to purchase OTC, but was very expensive and pharmacist told her to ask PCP for rx.      Patient states her nose is constantly runny.    Clinical Data: Care Coordinator Outreach  Outreach attempted x 1.  Left message on patient's voicemail with call back information and requested return call.  Plan: PCP messaged with request.   Care Coordinator will try to reach patient again in 3-5 business days.      Babs Garcia RN-BSN  Care Coordination  Phone:  [phone]  Email: [email or web address]  [facility], [place], [place] and [facility]

## 2020-05-21 ENCOUNTER — OFFICE VISIT (OUTPATIENT)
Dept: UROLOGY | Facility: CLINIC | Age: 85
End: 2020-05-21
Payer: MEDICARE

## 2020-05-21 ENCOUNTER — TELEPHONE (OUTPATIENT)
Dept: INTERNAL MEDICINE | Facility: CLINIC | Age: 85
End: 2020-05-21

## 2020-05-21 VITALS
DIASTOLIC BLOOD PRESSURE: 70 MMHG | WEIGHT: 102 LBS | BODY MASS INDEX: 20.56 KG/M2 | SYSTOLIC BLOOD PRESSURE: 102 MMHG | HEIGHT: 59 IN | HEART RATE: 75 BPM | OXYGEN SATURATION: 89 %

## 2020-05-21 DIAGNOSIS — Z85.51 PERSONAL HISTORY OF MALIGNANT NEOPLASM OF BLADDER: Primary | ICD-10-CM

## 2020-05-21 DIAGNOSIS — Z79.2 PROPHYLACTIC ANTIBIOTIC: ICD-10-CM

## 2020-05-21 PROCEDURE — 99212 OFFICE O/P EST SF 10 MIN: CPT | Mod: 25 | Performed by: UROLOGY

## 2020-05-21 PROCEDURE — 52000 CYSTOURETHROSCOPY: CPT | Performed by: UROLOGY

## 2020-05-21 RX ORDER — CIPROFLOXACIN 500 MG/1
500 TABLET, FILM COATED ORAL ONCE
Qty: 1 TABLET | Refills: 0 | Status: SHIPPED | OUTPATIENT
Start: 2020-05-21 | End: 2020-06-22

## 2020-05-21 ASSESSMENT — PAIN SCALES - GENERAL: PAINLEVEL: NO PAIN (0)

## 2020-05-21 ASSESSMENT — MIFFLIN-ST. JEOR: SCORE: 798.3

## 2020-05-21 NOTE — PATIENT INSTRUCTIONS

## 2020-05-21 NOTE — TELEPHONE ENCOUNTER
Marie has been approved to the [employer] assistance program for Dulera & Proventil HFA through Decemeber 31, 2020. She will receive this medication at no cost through the enrollment period.    A 90 day supply of Dulera & Proventil HFA will be delivered to Marie's home within 7-10 business days.  She has been informed of this approval.    She will contact my office for refills as we must work directly with the .  I will note EPIC as each refill is requested.    Thank you,    Vandana Gonzalez  Prescription Assistance

## 2020-05-21 NOTE — LETTER
5/21/2020       RE: Marie Kline  [address]  [place] [address]     Dear Colleague,    Thank you for referring your patient, Marie Kline, to the [facility] [place] [facility]. Please see a copy of my visit note below.    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  Her last recurrence was in February 2016.  She has been well and had no dysuria or hematuria.  Her cystoscopy 6 months ago was normal and your urine FISH test negative.  Other past medical history: COPD, cardiomyopathy, MI, CLL, CHF, hyperlipidemia, anxiety, hypothyroidism, degenerative back disease, splenomegaly, osteoporosis, purpura, femoral neck fracture, hip arthroplasty, shoulder fracture, closed head injury, tricuspid valve abnormality, previous smoker  Medications: Long list with no changes  Allergies: IV contrast, Prolia, adhesives  Review of systems: As above.  Exam: Alert and oriented, normal appearance, chronic cough.  Normal external genitalia, small urethral caruncle  Flexible cystoscopy-normal urethra mucosa, no bladder tumors, raised erythema or stones.  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  See me for cystoscopy next April, then follow-up yearly with Dr. Slater    Again, thank you for allowing me to participate in the care of your patient.      Sincerely,    Kar Nuñez MD

## 2020-05-21 NOTE — NURSING NOTE
Chief Complaint   Patient presents with     Hx Bladder Cancer     Prior to the start of the procedure and with procedural staff participation, I verbally confirmed the patient s identity using two indicators, relevant allergies, that the procedure was appropriate and matched the consent or emergent situation, and that the correct equipment/implants were available. Immediately prior to starting the procedure I conducted the Time Out with the procedural staff and re-confirmed the patient s name, procedure, and site/side. I have wiped the patient off with the povidone-Iodine solution, and draped them. (The Joint Commission universal protocol was followed.)  Yes    Sedation (Moderate or Deep): None    Evita Vickers, EMT

## 2020-05-21 NOTE — PROGRESS NOTES
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  Her last recurrence was in February 2016.  She has been well and had no dysuria or hematuria.  Her cystoscopy 6 months ago was normal and your urine FISH test negative.  Other past medical history: COPD, cardiomyopathy, MI, CLL, CHF, hyperlipidemia, anxiety, hypothyroidism, degenerative back disease, splenomegaly, osteoporosis, purpura, femoral neck fracture, hip arthroplasty, shoulder fracture, closed head injury, tricuspid valve abnormality, previous smoker  Medications: Long list with no changes  Allergies: IV contrast, Prolia, adhesives  Review of systems: As above.  Exam: Alert and oriented, normal appearance, chronic cough.  Normal external genitalia, small urethral caruncle  Flexible cystoscopy-normal urethra mucosa, no bladder tumors, raised erythema or stones.  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  See me for cystoscopy next April, then follow-up yearly with Dr. Slater

## 2020-05-27 NOTE — PROGRESS NOTES
CHW contacted [facility] and spoke to Jesi.  Jesi states that she's been in communication with patient regarding updates.  POC was approved as of 05/20/20, [facility] has an order in for the equipment and waiting for it to be delivered.    CHW will plan to follow up with patient as planned on 06/09 for monthly outreach.

## 2020-06-04 NOTE — PROGRESS NOTES
Clinic Care Coordination Contact  [facility]/Voicemail       Clinical Data: Care Coordinator Outreach  Outreach attempted x 2.  Left message on patient's voicemail with call back information and requested return call.  Left detailed message stating this writers departure from clinic and CHW will continue to follow and assist with POC.    Plan: Care Coordinator will send disenrollment letter with care coordinator contact information via [employer]. Care Coordinator will do no further outreaches at this time.    Babs Garcia RN-BSN  Care Coordination  Phone:  [phone]  Email: [email or web address]  [facility], [place], [place] and [facility]           DISPLAY PLAN FREE TEXT

## 2020-06-16 ENCOUNTER — PATIENT OUTREACH (OUTPATIENT)
Dept: CARE COORDINATION | Facility: CLINIC | Age: 85
End: 2020-06-16

## 2020-06-16 ENCOUNTER — TELEPHONE (OUTPATIENT)
Dept: CARE COORDINATION | Facility: CLINIC | Age: 85
End: 2020-06-16

## 2020-06-16 NOTE — TELEPHONE ENCOUNTER
"Dr iKser,    Community Health Worker contacted patient for Care Coordination outreach. Among my phone call patient question if there was anything that you would be able to do regarding her hand tremors. Patient states that they are getting worse, she \"can't even put earring on anymore.\" Patient is wondering if there is an Rx that could help, she would also like to schedule a follow up with Dr Kiser.  Please contact patient at your earliest convenience.    Thank you,    Richard Zelaya, [facility]  Clinic Care Coordination  [facility] : [place], [place], [place], and [place]  Phone: [phone]    "

## 2020-06-16 NOTE — PROGRESS NOTES
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    Patient questioned if there was something available that she could take to help with her tremors.    Patient states that they're getting pretty bad to the point she can't get her earrings in.    Goals:   Goals Addressed as of 6/16/2020 at 3:22 PM                 Today    5/13/20      paint (pt-stated)   60%  60%    Added 10/23/19 by Babs Garcia RN     Goal Statement: I will be able to paint my birds again!  Date goal set: 10/23/19   Measure of Success: Patient will have decreased tremors that prohibit her from painting.  Barriers: Tremors/dizziness  Strengths: positive attitude.  Good support system.  Social. Caty.  Date to Achieve By: 1 may 2020  Patient expressed understanding of goal: yes    Action steps to achieve this goal  1. I will speak to MTM about SE of medications   2. I will f/u with provider as recommended.  3. I will stay active and social.    As of today's date 5/13/2020 goal is met at 51 - 75%.   Goal Status:  Ongoing  As of today's date 4/7/2020 goal is met at 51 - 75%.   Goal Status:  Ongoing  As of today's date 3/16/2020 goal is met at 51 - 75%.   Goal Status:  Showing progress  Patient has recently increased social activities, but now has cut back completely due to \"lock down\" at [facility].  RN CC will continue to follow patient closely.    As of today's date 11/22/2019 goal is met at 26 - 50%.   Goal Status:  Ongoing  Talked to Dr Kiser about this, but stated she did not give any new Rx's for this. Schedule to see Neulogist on 11/26/19.        Discussed the Following:  Patient stated she would like to get a message out to Dr Kiser regarding tremors and possibly schedule a follow up.  CHW informed patient that she'll send a message to Dr Kiser's team to reach out to schedule an appointment and also to " see if there is anything she would recommend for the tremors.    CHW inquired if patient had received her Portable Oxygen Concentrator yet.  Patient stated she had, she had just received it last week.  CHW expressed excitement with the patient as we've been requesting for this device for months now.  Patient is very glad to finally have her device as she can finally go and see her great grand children without any problems now.  Patient expressed gratitude and appreciation for all my help    CHW informed patient that Babs Garcia, CC RN, was no longer with CC anymore.  CHW informed patient that she may be hearing from another CC.  Patient acknowledged understanding.  As patient often called Babs, CHW provided patient with name and number to myself for her to call in the meantime.    Intervention and Education during outreach: 1 month    CHW Plan: I will send an encounter to Dr Kiser for follow up and Rx request.    KOREY Lawrence  Clinic Care Coordination  [facility] Clinics : [place], [place], [place], and [place]  Phone: [phone]    ______________________  Next Outreach:  07/16/20  Planned Outreach Frequency: Monthly  Preferred Phone Number: [phone]    Last Assessment Date: 05/16/19  Care Plan Completion Date: 05/16/19  ______________________

## 2020-06-22 ENCOUNTER — VIRTUAL VISIT (OUTPATIENT)
Dept: INTERNAL MEDICINE | Facility: CLINIC | Age: 85
End: 2020-06-22
Payer: MEDICARE

## 2020-06-22 DIAGNOSIS — E03.9 ACQUIRED HYPOTHYROIDISM: ICD-10-CM

## 2020-06-22 DIAGNOSIS — N18.30 CKD (CHRONIC KIDNEY DISEASE) STAGE 3, GFR 30-59 ML/MIN (H): ICD-10-CM

## 2020-06-22 DIAGNOSIS — J44.1 COPD EXACERBATION (H): Primary | ICD-10-CM

## 2020-06-22 DIAGNOSIS — I42.8 NONISCHEMIC CARDIOMYOPATHY (H): ICD-10-CM

## 2020-06-22 DIAGNOSIS — R25.1 TREMOR: ICD-10-CM

## 2020-06-22 DIAGNOSIS — C91.10 CLL (CHRONIC LYMPHOCYTIC LEUKEMIA) (H): ICD-10-CM

## 2020-06-22 PROCEDURE — 99442 ZZC PHYSICIAN TELEPHONE EVALUATION 11-20 MIN: CPT | Performed by: INTERNAL MEDICINE

## 2020-06-22 NOTE — PROGRESS NOTES
"Marie Kline is a 88 year old female who is being evaluated via a billable telephone visit.      The patient has been notified of following:     \"This telephone visit will be conducted via a call between you and your physician/provider. We have found that certain health care needs can be provided without the need for a physical exam.  This service lets us provide the care you need with a short phone conversation.  If a prescription is necessary we can send it directly to your pharmacy.  If lab work is needed we can place an order for that and you can then stop by our lab to have the test done at a later time.    Telephone visits are billed at different rates depending on your insurance coverage. During this emergency period, for some insurers they may be billed the same as an in-person visit.  Please reach out to your insurance provider with any questions.    If during the course of the call the physician/provider feels a telephone visit is not appropriate, you will not be charged for this service.\"    Patient has given verbal consent for Telephone visit?  Yes    What phone number would you like to be contacted at? [phone]    How would you like to obtain your AVS? T[employer]art    Subjective     Marie Kline is a 88 year old female who presents via phone visit today for the following health issues:    HPI     The patient reports that she has had hand shaking for month.  Occurs when she is trying to write.   The patient reports that she has to keep doing something with her hands.    The patient reports both hands.    No family history of Parkinson's or benign tremor.   No new medications.     COPD.  She wears two liters of oxygen.  She is using the dulera and nebulizer.     Heart Failure Follow-up    Are you experiencing any shortness of breath? No    Are you experiencing any swelling in your legs or feet?  No    Are you using more pillows than usual? No    Do you cough at night?  No    Do you check your weight " daily?  She has lost some weight, but fluctuates    Have you had a weight change recently?  No    Are you having any of the following side effects from your medications? (Select all that apply)  The patient does not report symptoms of dizziness, fatigue, cough, swelling, or slow heart beat.    Since your last visit, how many times have you gone to the cardiologist, urgent care, emergency room, or hospital because of your heart failure?   None    Chronic Kidney Disease Follow-up      Do you take any over the counter pain medicine?: No    Hypothyroidism Follow-up      Since last visit, patient describes the following symptoms: Weight stable, no hair loss, no skin changes, no constipation, no loose stools        She walks with her walker down to the mailbox and then to the end of the building.        Bladder cancer.  Follows with urology.    CLL.  Follows with oncology.         Patient Active Problem List   Diagnosis     Acute and chronic respiratory failure with hypercapnia (HCC)     Nonischemic cardiomyopathy (H)     Pneumonia     Acute myocardial infarction, initial episode of care (HCC)     CLL (chronic lymphocytic leukemia) (HCC)     CHF (congestive heart failure) (HCC)     Cardiomyopathy in other diseases classified elsewhere (HCC)     Hyperlipidemia with target LDL less than 130     Health Care Home     COPD exacerbation (H)     LESLY (generalized anxiety disorder)     Hypothyroidism     Back pain with radiation     DDD (degenerative disc disease), lumbar     Splenomegaly     Lumbar degenerative disc disease     Lumbar foraminal stenosis     Central spinal stenosis     Bladder cancer (H)     Sepsis (H)     Senile osteoporosis     CKD (chronic kidney disease) stage 3, GFR 30-59 ml/min (H)     Purpura senilis (H)     Xerosis cutis     Malignant neoplasm of urinary bladder, unspecified site (H)     Hypoxia     Femoral neck fracture (H)     S/P total hip arthroplasty     NSTEMI (non-ST elevated myocardial infarction)  (H)     Stress-induced cardiomyopathy     COPD with acute exacerbation (H)     Shoulder fracture     Mouth sores     Closed nondisplaced fracture of pelvis with routine healing, unspecified part of pelvis, subsequent encounter     Pelvic hematoma in female     Closed head injury, subsequent encounter     Multiple skin tears     Pneumonia of left lung due to infectious organism, unspecified part of lung     Chest pain     Coronary artery disease involving native heart with angina pectoris, unspecified vessel or lesion type (H)     Past Surgical History:   Procedure Laterality Date     BIOPSY LYMPH NODE INGUINAL Right 10/23/2018    Procedure: excsional biopsy right inguinal lymph node;  Surgeon: Reji Connors MD;  Location: [facility] OR     BLADDER SURGERY       CORONARY ANGIOGRAPHY ADULT ORDER  10/2014    minimal CAD     CV LEFT HEART CATH N/A 2018    Procedure: Left Heart Cath;  Surgeon: Sadiq Carrasco MD;  Location:  HEART CARDIAC CATH LAB     CYSTOSCOPY       CYSTOSCOPY, BIOPSY BLADDER, COMBINED N/A 2016    Procedure: COMBINED CYSTOSCOPY, BIOPSY BLADDER;  Surgeon: Kar Nuñez MD;  Location:  OR     CYSTOSCOPY, TRANSURETHRAL RESECTION (TUR) TUMOR BLADDER, COMBINED N/A 2016    Procedure: COMBINED CYSTOSCOPY, TRANSURETHRAL RESECTION (TUR) TUMOR BLADDER;  Surgeon: Kar Nuñez MD;  Location:  OR     ESOPHAGOSCOPY, GASTROSCOPY, DUODENOSCOPY (EGD), COMBINED N/A 2016    Procedure: COMBINED ESOPHAGOSCOPY, GASTROSCOPY, DUODENOSCOPY (EGD);  Surgeon: Freddie Diez MD;  Location:  GI     OPEN REDUCTION INTERNAL FIXATION HIP BIPOLAR Left 2018    Procedure: Left bipolar hemiarthroplasty;  Surgeon: Scar Reynolds MD;  Location:  OR       Social History     Tobacco Use     Smoking status: Former Smoker     Types: Cigarettes     Last attempt to quit: 2/3/1996     Years since quittin.4     Smokeless tobacco: Never Used   Substance Use Topics     Alcohol  use: No     Alcohol/week: 0.0 standard drinks     Family History   Problem Relation Age of Onset     Cerebrovascular Disease Mother      Cancer Father            Reviewed and updated as needed this visit by Provider         Review of Systems   CONSTITUTIONAL: NEGATIVE for fever, chills, change in weight  RESP: NEGATIVE for significant cough or SOB  CV: NEGATIVE for chest pain, palpitations or peripheral edema       Objective   Reported vitals:  There were no vitals taken for this visit.   healthy, alert and no distress  PSYCH: Alert and oriented times 3; coherent speech, normal   rate and volume, able to articulate logical thoughts, able   to abstract reason, no tangential thoughts, no hallucinations   or delusions  Her affect is normal  RESP: No cough, no audible wheezing, able to talk in full sentences  Remainder of exam unable to be completed due to telephone visits    Diagnostic Test Results:  Labs reviewed in Epic        Assessment/Plan:      (J44.1) COPD exacerbation (H)  (primary encounter diagnosis)  Comment: stable  Plan: inhalers    (N18.3) CKD (chronic kidney disease) stage 3, GFR 30-59 ml/min (H)  Comment: assess  Plan: **Comprehensive metabolic panel FUTURE anytime            (I42.8) Nonischemic cardiomyopathy (H)  Comment: stable  Plan: monitor    (C91.10) CLL (chronic lymphocytic leukemia) (HCC)  Comment:   Plan: oncology    (E03.9) Acquired hypothyroidism  Comment: assess  Plan: **TSH with free T4 reflex FUTURE anytime          tremors.  TSH and CMP  neurology      Every 3-6 monthsd    Phone call duration:  12 minutes    Piper Kiser MD

## 2020-06-26 ENCOUNTER — TRANSFERRED RECORDS (OUTPATIENT)
Dept: HEALTH INFORMATION MANAGEMENT | Facility: CLINIC | Age: 85
End: 2020-06-26

## 2020-06-30 DIAGNOSIS — E03.9 ACQUIRED HYPOTHYROIDISM: ICD-10-CM

## 2020-06-30 DIAGNOSIS — R41.3 MEMORY LOSS: ICD-10-CM

## 2020-06-30 DIAGNOSIS — N18.30 CKD (CHRONIC KIDNEY DISEASE) STAGE 3, GFR 30-59 ML/MIN (H): ICD-10-CM

## 2020-06-30 LAB — VIT B12 SERPL-MCNC: 428 PG/ML (ref 193–986)

## 2020-06-30 PROCEDURE — 36415 COLL VENOUS BLD VENIPUNCTURE: CPT | Performed by: INTERNAL MEDICINE

## 2020-06-30 PROCEDURE — 84443 ASSAY THYROID STIM HORMONE: CPT | Performed by: INTERNAL MEDICINE

## 2020-06-30 PROCEDURE — 82607 VITAMIN B-12: CPT | Performed by: INTERNAL MEDICINE

## 2020-06-30 PROCEDURE — 80053 COMPREHEN METABOLIC PANEL: CPT | Performed by: INTERNAL MEDICINE

## 2020-07-01 LAB
ALBUMIN SERPL-MCNC: 3.7 G/DL (ref 3.4–5)
ALP SERPL-CCNC: 131 U/L (ref 40–150)
ALT SERPL W P-5'-P-CCNC: 23 U/L (ref 0–50)
ANION GAP SERPL CALCULATED.3IONS-SCNC: 4 MMOL/L (ref 3–14)
AST SERPL W P-5'-P-CCNC: 24 U/L (ref 0–45)
BILIRUB SERPL-MCNC: 0.5 MG/DL (ref 0.2–1.3)
BUN SERPL-MCNC: 8 MG/DL (ref 7–30)
CALCIUM SERPL-MCNC: 8.6 MG/DL (ref 8.5–10.1)
CHLORIDE SERPL-SCNC: 99 MMOL/L (ref 94–109)
CO2 SERPL-SCNC: 32 MMOL/L (ref 20–32)
CREAT SERPL-MCNC: 0.9 MG/DL (ref 0.52–1.04)
GFR SERPL CREATININE-BSD FRML MDRD: 57 ML/MIN/{1.73_M2}
GLUCOSE SERPL-MCNC: 117 MG/DL (ref 70–99)
POTASSIUM SERPL-SCNC: 3.6 MMOL/L (ref 3.4–5.3)
PROT SERPL-MCNC: 7 G/DL (ref 6.8–8.8)
SODIUM SERPL-SCNC: 135 MMOL/L (ref 133–144)
TSH SERPL DL<=0.005 MIU/L-ACNC: 0.56 MU/L (ref 0.4–4)

## 2020-07-08 ENCOUNTER — PATIENT OUTREACH (OUTPATIENT)
Dept: NURSING | Facility: CLINIC | Age: 85
End: 2020-07-08
Payer: MEDICARE

## 2020-07-08 ENCOUNTER — TELEPHONE (OUTPATIENT)
Dept: CARE COORDINATION | Facility: CLINIC | Age: 85
End: 2020-07-08

## 2020-07-08 NOTE — PROGRESS NOTES
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  Date goal set: 10/23/19   Measure of Success: Patient will have decreased tremors that prohibit her from painting.  Barriers: Tremors/dizziness  Strengths: positive attitude.  Good support system.  Social. Caty.  Date to Achieve By: 1 may 2020  Patient expressed understanding of goal: yes    Action steps to achieve this goal  1. I will speak to MTM about SE of medications   2. I will f/u with provider as recommended.  3. I will stay active and social.    07/08/20, CHW:    Marie did get in touch with Dr Kiser regarding her tremors.    Marie states that Dr Kiser is concerned that it might be a thyroid issue.    Had labs done to check thyroid        Intervention and Education during outreach:   CHW had received a voicemail from Marie on 07/06, stating that she had received her lab results via [employer], but for some reason was unable to view it.  Patient requested for my call back to see if I would be able to assist with reading the results.  CHW informed patient that I would not be able to read the results to her, since I'm not a nurse or anything.  However, informed her that I will send a message to Dr Kiser and her staff to reach out regarding the results.  Patient acknowledged understanding.    CHW inquired how Marie had been doing otherwise, and if she had any other concerns or needs at this time.  Marie states that she had bumped her leg 2 weeks ago and opened her skin.  She states that is was bleeding a lot at the time, however, she states she \"put the skin back\".  It's now scabbed over and is swollen, however Marie states that there are no signs of infection.  Marie would like to get this " information out to Dr Kiser as well.    CHW Plan:  CHW will send a telephone encounter to Dr Kiser regarding patients concerns above.  We had discussed next follow up in 1 month for progression of goals. However, CHW encouraged patient to contact CC if there were any changes. Patient acknowledged understanding and expressed appreciation for the call back.    Richard Zelaya, LISSYW  Clinic Care Coordination  [facility] : [place], [place], [place], and [place]  Phone: [phone]    ______________________  Next Outreach:  08/05/20  Planned Outreach Frequency: Monthly  Preferred Phone Number: [phone]    Last Assessment Date: 05/16/19  Care Plan Completion Date: 05/16/19  ______________________

## 2020-07-08 NOTE — TELEPHONE ENCOUNTER
Dr Kiser,    Patient states that she had received notification of lab results in My Chart, however, for some reason is unable to pull up results. Patient is requesting on assistance with reading the results.  Patient has also stated that she had bumped her leg about 2 weeks ago which caused an opening of her skin. Patient states it's now scabbed over, however, it's swollen and patient is wondering what to do. Marie states that there are no signs of infection.  Please contact patient at your earliest convenience.    Thank you,    Richard Zelaya, [facility]  Clinic Care Coordination  [facility] : [place], [place], [place], and [place]  Phone: [phone]

## 2020-07-08 NOTE — TELEPHONE ENCOUNTER
"Injured R lower leg on piece of furniture about 2 weeks ago.  Has a black scab over it, a little larger than a 25 cent coin and redness goes another 1-2 inches below the wound  Outer aspect of right lower leg with swelling of lower leg.  Denies wound drainage or fever.  She is using a \"medicated healing salve\" that she got when she was at [facility] last Fall.     Patient is unable to use her My Chart so cannot forward photos and she doesn't believe she can do a video visit.  Scheduled patient to be seen in clinic tomorrow by Dr. King.  ZAID Sin R.N.      "

## 2020-07-09 ENCOUNTER — OFFICE VISIT (OUTPATIENT)
Dept: INTERNAL MEDICINE | Facility: CLINIC | Age: 85
End: 2020-07-09
Payer: MEDICARE

## 2020-07-09 VITALS
BODY MASS INDEX: 18.99 KG/M2 | OXYGEN SATURATION: 89 % | RESPIRATION RATE: 16 BRPM | DIASTOLIC BLOOD PRESSURE: 74 MMHG | WEIGHT: 94 LBS | SYSTOLIC BLOOD PRESSURE: 147 MMHG | HEART RATE: 83 BPM

## 2020-07-09 DIAGNOSIS — J44.1 COPD EXACERBATION (H): ICD-10-CM

## 2020-07-09 DIAGNOSIS — S81.801A OPEN WOUND OF RIGHT LOWER EXTREMITY, INITIAL ENCOUNTER: Primary | ICD-10-CM

## 2020-07-09 DIAGNOSIS — I42.8 NONISCHEMIC CARDIOMYOPATHY (H): ICD-10-CM

## 2020-07-09 DIAGNOSIS — C91.10 CLL (CHRONIC LYMPHOCYTIC LEUKEMIA) (H): ICD-10-CM

## 2020-07-09 PROCEDURE — 99214 OFFICE O/P EST MOD 30 MIN: CPT | Performed by: INTERNAL MEDICINE

## 2020-07-09 RX ORDER — CEPHALEXIN 500 MG/1
500 CAPSULE ORAL 3 TIMES DAILY
Qty: 30 CAPSULE | Refills: 0 | Status: SHIPPED | OUTPATIENT
Start: 2020-07-09 | End: 2020-08-06

## 2020-07-09 NOTE — PROGRESS NOTES
ASSESSMENT & PLAN:                                                      (S83.801Q) Open wound of right lower extremity, initial encounter  (primary encounter diagnosis)  Comment:   Plan: HOME CARE NURSING REFERRAL, cephALEXin (KEFLEX)        500 MG capsule               Chief Complaint:                                                        R leg wound    SUBJECTIVE:                                                    History of present illness     R leg wound  -- 2 weeks ago she bump the leg on a stool.   -- open wound since then. Some pain     Exam: open wound on the R anterior shin with mild cellulitis around. The skin is very thin. I applied adrienne adherent gauze and gauze wrapped         CLL, COPD, CHF  -- she is very fragile, can't go by herself. She is home bounded   -- we will order [facility]    ROS:                                                      ROS: negative for fever, chills, cough, wheezes, chest pain, shortness of breath, vomiting, abdominal pain, leg swelling Pos for chr SOB        OBJECTIVE:                                                    Physical Exam :    Blood pressure (!) 147/74, pulse 83, resp. rate 16, weight 42.6 kg (94 lb), SpO2 (!) 89 %, not currently breastfeeding.   NAD, appears comfortable  Skin: no rashes   Chest: clear to auscultation bilaterally, good respiratory effort  Heart: S1 S2, RRR, no mgr appreciated  Abdomen: soft, not tender,   Extremities: ntrace ankle edema, wound as above   Neurologic: A, Ox3, no focal signs appreciated    PMHx: reviewed  Past Medical History:   Diagnosis Date     Arthritis     Generalized     Bladder cancer (H)      Bladder cancer (H)      Cardiomyopathy (H)      CLL (chronic lymphocytic leukemia) (H)      Congestive heart failure (H) 10/24/2014    flash pulm edema ass w/Takotsubo     COPD (chronic obstructive pulmonary disease) (H)     noc O2     Hypertension      Hypothyroid      Mumps      Myocardial infarction (H) 10/2014    Takotsubo  presentation w/mild CAD     Pulmonary edema cardiac cause (H) 10/08/2014    associated w/Takotsubo ACS     Tricuspid valve disorders, 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    TR 2-3+ per echo      PSHx: reviewed  Past Surgical History:   Procedure Laterality Date     BIOPSY LYMPH NODE INGUINAL Right 10/23/2018    Procedure: excsional biopsy right inguinal lymph node;  Surgeon: Reji Connors MD;  Location: [facility] OR     BLADDER SURGERY       CORONARY ANGIOGRAPHY ADULT ORDER  10/2014    minimal CAD     CV LEFT HEART CATH N/A 12/11/2018    Procedure: Left Heart Cath;  Surgeon: Sadiq Carrasco MD;  Location:  HEART CARDIAC CATH LAB     CYSTOSCOPY       CYSTOSCOPY, BIOPSY BLADDER, COMBINED N/A 2/9/2016    Procedure: COMBINED CYSTOSCOPY, BIOPSY BLADDER;  Surgeon: Kar Nuñez MD;  Location: [facility] OR     CYSTOSCOPY, TRANSURETHRAL RESECTION (TUR) TUMOR BLADDER, COMBINED N/A 2/9/2016    Procedure: COMBINED CYSTOSCOPY, TRANSURETHRAL RESECTION (TUR) TUMOR BLADDER;  Surgeon: Kar Nuñez MD;  Location:  OR     ESOPHAGOSCOPY, GASTROSCOPY, DUODENOSCOPY (EGD), COMBINED N/A 6/22/2016    Procedure: COMBINED ESOPHAGOSCOPY, GASTROSCOPY, DUODENOSCOPY (EGD);  Surgeon: Freddie Diez MD;  Location:  GI     OPEN REDUCTION INTERNAL FIXATION HIP BIPOLAR Left 11/19/2018    Procedure: Left bipolar hemiarthroplasty;  Surgeon: Scar Reynolds MD;  Location: [facility] OR        Meds: reviewed  Current Outpatient Medications   Medication Sig Dispense Refill     ACETAMINOPHEN PO Take 1,000 mg by mouth every 8 hours        albuterol (PROVENTIL HFA) 108 (90 Base) MCG/ACT inhaler Inhale 2 puffs into the lungs every 6 hours as needed for shortness of breath / dyspnea or wheezing       albuterol (PROVENTIL) (2.5 MG/3ML) 0.083% neb solution Take 1 vial (2.5 mg) by nebulization every 6 hours as needed for shortness of breath / dyspnea or wheezing 1 Box 3     aspirin 81 MG EC tablet Take 81 mg by mouth daily        atorvastatin (LIPITOR) 20 MG tablet Take 1 tablet (20 mg) by mouth daily 90 tablet 4     calcium-vitamin D-vitamin K (VIACTIV) 500-500-40 MG-UNT-MCG CHEW Take 1 tablet by mouth daily       ferrous sulfate (IRON) 325 (65 FE) MG tablet Take 325 mg by mouth daily (with breakfast)        fluticasone (FLONASE) 50 MCG/ACT nasal spray Spray 1 spray into both nostrils daily 16 g 0     furosemide (LASIX) 20 MG tablet TAKE ONE TABLET BY MOUTH ONE TIME DAILY  90 tablet 1     Immune Globulin, Human, (OCTAGAM) 5 GM/100ML SOLN 300 mg/kg into the vein every 30 days    Hold this medication while in TCU-resume at discharge from TCU       levothyroxine (SYNTHROID/LEVOTHROID) 75 MCG tablet Take 1 tablet (75 mcg) by mouth daily 90 tablet 4     metoprolol succinate ER (TOPROL-XL) 25 MG 24 hr tablet TAKE ONE TABLET BY MOUTH ONE TIME DAILY  90 tablet 0     mometasone-formoterol (DULERA) 200-5 MCG/ACT inhaler Inhale 2 puffs into the lungs 2 times daily       order for DME Equipment being ordered: Oxygen concentrator, O2-85% resting room air & 02-82% walking room air, Flow rate 2L 1 each 0     order for DME Equipment being ordered: Oxygen 2LNC continuous with portability tank due to mobility in the home.     Length of need is 99 months.       sodium chloride (OCEAN) 0.65 % nasal spray Spray 1 spray into both nostrils every hour as needed for congestion       traZODone (DESYREL) 50 MG tablet Take 2 tablets by mouth At Bedtime 90 tablet 0     gabapentin (NEURONTIN) 300 MG capsule Take 1 capsule (300 mg) by mouth every 12 hours (Patient not taking: Reported on 7/9/2020) 180 capsule 0       Soc Hx: reviewed  Fam Hx: reviewed          Chloé King MD  Internal Medicine

## 2020-07-10 ENCOUNTER — TELEPHONE (OUTPATIENT)
Dept: CARE COORDINATION | Facility: CLINIC | Age: 85
End: 2020-07-10

## 2020-07-10 NOTE — TELEPHONE ENCOUNTER
Dr. Kiser,  Due to the high volume of referrals, we are unable to see Marie at this time for homecare.  We have transferred her referral to [facility], who will be seeing her in a timely manner.  We apologize for the inconvenience.    Thank you so much for this referral,    Alison Clay RN  [facility]

## 2020-07-23 DIAGNOSIS — E03.8 OTHER SPECIFIED HYPOTHYROIDISM: ICD-10-CM

## 2020-07-24 RX ORDER — LEVOTHYROXINE SODIUM 75 UG/1
TABLET ORAL
Qty: 90 TABLET | Refills: 0 | Status: SHIPPED | OUTPATIENT
Start: 2020-07-24 | End: 2020-10-21

## 2020-07-24 NOTE — TELEPHONE ENCOUNTER
"Prescription approved per [facility] Refill Protocol.    Requested Prescriptions   Pending Prescriptions Disp Refills     levothyroxine (SYNTHROID/LEVOTHROID) 75 MCG tablet [Pharmacy Med Name: Levothyroxine Sodium Oral Tablet 75 MCG] 90 tablet 0     Sig: TAKE ONE TABLET BY MOUTH ONE TIME DAILY       Thyroid Protocol Passed - 7/23/2020 11:20 AM        Passed - Patient is 12 years or older        Passed - Recent (12 mo) or future (30 days) visit within the authorizing provider's specialty     Patient has had an office visit with the authorizing provider or a provider within the authorizing providers department within the previous 12 mos or has a future within next 30 days. See \"Patient Info\" tab in inbasket, or \"Choose Columns\" in Meds & Orders section of the refill encounter.              Passed - Medication is active on med list        Passed - Normal TSH on file in past 12 months     Recent Labs   Lab Test 06/30/20  1328   TSH 0.56              Passed - No active pregnancy on record     If patient is pregnant or has had a positive pregnancy test, please check TSH.          Passed - No positive pregnancy test in past 12 months     If patient is pregnant or has had a positive pregnancy test, please check TSH.               "

## 2020-08-06 ENCOUNTER — ALLIED HEALTH/NURSE VISIT (OUTPATIENT)
Dept: PHARMACY | Facility: CLINIC | Age: 85
End: 2020-08-06
Payer: COMMERCIAL

## 2020-08-06 DIAGNOSIS — J30.2 SEASONAL ALLERGIC RHINITIS, UNSPECIFIED TRIGGER: ICD-10-CM

## 2020-08-06 DIAGNOSIS — E63.9 NUTRITIONAL DEFICIENCY: ICD-10-CM

## 2020-08-06 DIAGNOSIS — E78.5 HYPERLIPIDEMIA WITH TARGET LDL LESS THAN 130: ICD-10-CM

## 2020-08-06 DIAGNOSIS — F51.01 PRIMARY INSOMNIA: ICD-10-CM

## 2020-08-06 DIAGNOSIS — S81.809D OPEN WOUND OF KNEE, LEG, AND ANKLE, UNSPECIFIED LATERALITY, SUBSEQUENT ENCOUNTER: ICD-10-CM

## 2020-08-06 DIAGNOSIS — F41.1 GAD (GENERALIZED ANXIETY DISORDER): Primary | ICD-10-CM

## 2020-08-06 DIAGNOSIS — S81.009D OPEN WOUND OF KNEE, LEG, AND ANKLE, UNSPECIFIED LATERALITY, SUBSEQUENT ENCOUNTER: ICD-10-CM

## 2020-08-06 DIAGNOSIS — E03.9 HYPOTHYROIDISM, UNSPECIFIED TYPE: ICD-10-CM

## 2020-08-06 DIAGNOSIS — S32.9XXD CLOSED NONDISPLACED FRACTURE OF PELVIS WITH ROUTINE HEALING, UNSPECIFIED PART OF PELVIS, SUBSEQUENT ENCOUNTER: ICD-10-CM

## 2020-08-06 DIAGNOSIS — J44.1 COPD WITH ACUTE EXACERBATION (H): ICD-10-CM

## 2020-08-06 DIAGNOSIS — S91.009D OPEN WOUND OF KNEE, LEG, AND ANKLE, UNSPECIFIED LATERALITY, SUBSEQUENT ENCOUNTER: ICD-10-CM

## 2020-08-06 DIAGNOSIS — I10 ESSENTIAL HYPERTENSION WITH GOAL BLOOD PRESSURE LESS THAN 140/90: ICD-10-CM

## 2020-08-06 DIAGNOSIS — C91.10 CLL (CHRONIC LYMPHOCYTIC LEUKEMIA) (H): ICD-10-CM

## 2020-08-06 PROCEDURE — 99607 MTMS BY PHARM ADDL 15 MIN: CPT | Performed by: PHARMACIST

## 2020-08-06 PROCEDURE — 99606 MTMS BY PHARM EST 15 MIN: CPT | Performed by: PHARMACIST

## 2020-08-06 RX ORDER — TRAZODONE HYDROCHLORIDE 50 MG/1
50 TABLET, FILM COATED ORAL AT BEDTIME
Qty: 90 TABLET | Refills: 0 | COMMUNITY
Start: 2020-08-06 | End: 2020-11-11

## 2020-08-06 RX ORDER — BIOTIN 10 MG
2 TABLET ORAL DAILY
COMMUNITY

## 2020-08-06 NOTE — PROGRESS NOTES
MTM ENCOUNTER  SUBJECTIVE/OBJECTIVE:                           Marie Kline is a 88 year old female called for a follow-up visit. She was referred to me from Dr. Kiser.  Today's visit is a follow-up MTM visit from 4/16/20.     Patient consented to a telehealth visit: yes  Telemedicine Visit Details  Type of service:  Telephone visit  Start Time: 11:01 AM  End Time: 1131 am  Originating Location (pt. Location): Home  Distant Location (provider location):  [facility]  Mode of Communication:  Telephone    Chief Complaint: no concerns      Allergies/ADRs: Reviewed in EHR  Tobacco:  reports that she quit smoking about 24 years ago. Her smoking use included cigarettes. She has never used smokeless tobacco.  Alcohol: not currently using      Medication Adherence/Access: no issues reported  Patient uses pill box(es).    Leg Wound:  Prescribed cephalexin and completed 10-day course.  Reports wound has healed. Bruises easily due to thin skin.    Shoulder/Pelvis fracture:  S/p fracture in Sept. 2019.  No pain at this time.  Not taking anything for pain.     COPD: Current medications:  Proventil BID (using at least once per day), albuterol Nebs as needed (helps with congestion) and Dulera BID.  Does rinse after using the Dulera.    Sleeps with 2L oxygen as needed; not needed lately.  Pt is not experiencing side effects.   Pt reports the following symptoms: none at this time  Pt does have an COPD Action Plan on file.   Has spirometry been completed: Yes   Followed by Dr. Courtney.    CLL: Getting Immune Globulin infusions every month - due next Tuesday.  No concerns at this time.  Followed by Oncology.    Hypertension: Current medications include metoprolol succinate 25 mg daily and furosemide 20 mg daily.  Patient does not self-monitor BP.  Patient reports no current medication side effects.  BP Readings from Last 3 Encounters:   07/09/20 (!) 147/74   05/21/20 102/70   03/05/20 111/66         Hyperlipidemia: Current  therapy includes atorvastatin 20 mg once daily.  Pt reports no significant myalgias or other side effects.  The ASCVD Risk score ([place] DE GUZMAN Jr., et al., 2013) failed to calculate for the following reasons:    The 2013 ASCVD risk score is only valid for ages 40 to 79    The patient has a prior MI or stroke diagnosis    Recent Labs   Lab Test 05/01/19  1144 03/12/18  1009  10/09/14  0350   CHOL 161 151   < > 167   HDL 45* 47*   < > 55   LDL 84 84   < > 88   TRIG 160* 98   < > 121   CHOLHDLRATIO  --   --   --  3.0    < > = values in this interval not displayed.       Hypothyroidism: Patient is taking levothyroxine 75 mcg daily. Patient is having the following symptoms: hypothyroidism -  fatigue.   TSH   Date Value Ref Range Status   10/15/2019 0.45 0.40 - 4.00 mU/L Final       Anxiety:  Current medications include: sertraline 50 mg daily.  No concerns at this time.  LESLY-7 SCORE 12/19/2018 4/16/2020   Total Score 12 0       Insomnia: Current medications include: trazodone 50 mg at bedtime. Pt reports no issues. Able to fall back asleep if she does wake.  Occasionally will nap during the day.     Allergic rhinitis: Current medications include fluticasone nasal spray 1 spray(s) once daily. Primary symptoms are runny nose. Pt feels that current therapy is effective.     Supplements:  Taking chewable MV twice daily.  Drinks milk and eat ice cream.      Today's Vitals: There were no vitals taken for this visit.      ASSESSMENT:                              Medication Adherence: excellent, no issues identified    Leg Wound:  resolved    Shoulder/Pelvis fracture:  stable    COPD: stable    CLL: stable    Hypertension: blood pressure elevated at last OV, prior readings at goal.    Hyperlipidemia: due for labs    Hypothyroidism: stable    Anxiety:  stable    Insomnia: stable     Allergic rhinitis: stable    Supplements:  stable    PLAN:                            1.  Continue current regimen    I spent 30 minutes with this patient  today. All changes were made via collaborative practice agreement with PCP. A copy of the visit note was provided to the patient's primary care provider.    Will follow up in 6 months.    The patient declined a summary of these recommendations.     Yuliana Cast , Pharm D  [phone] (phone)  [phone] (pager)  Medication Therapy Management Pharmacist

## 2020-08-07 DIAGNOSIS — I24.9 ACS (ACUTE CORONARY SYNDROME) (H): ICD-10-CM

## 2020-08-07 DIAGNOSIS — E78.5 HYPERLIPIDEMIA WITH TARGET LDL LESS THAN 130: ICD-10-CM

## 2020-08-07 RX ORDER — ATORVASTATIN CALCIUM 20 MG/1
TABLET, FILM COATED ORAL
Qty: 90 TABLET | Refills: 0 | Status: SHIPPED | OUTPATIENT
Start: 2020-08-07 | End: 2020-11-08

## 2020-08-07 NOTE — TELEPHONE ENCOUNTER
Pending Prescriptions:                       Disp   Refills    atorvastatin (LIPITOR) 20 MG tablet [Pharm*90 tab*0        Sig: TAKE ONE TABLET BY MOUTH ONE TIME DAILY     Routing refill request to provider for review/approval because:  Labs not current:  LDL    LDL Cholesterol Calculated   Date Value Ref Range Status   05/01/2019 84 <100 mg/dL Final     Comment:     Desirable:       <100 mg/dl

## 2020-08-13 ENCOUNTER — PATIENT OUTREACH (OUTPATIENT)
Dept: NURSING | Facility: CLINIC | Age: 85
End: 2020-08-13
Payer: MEDICARE

## 2020-08-13 NOTE — PROGRESS NOTES
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  Date goal set: 10/23/19   Measure of Success: Patient will have decreased tremors that prohibit her from painting.  Barriers: Tremors/dizziness  Strengths: positive attitude.  Good support system.  Social. Caty.  Date to Achieve By: 1 may 2020  Patient expressed understanding of goal: yes    Action steps to achieve this goal  1. I will speak to MTM about SE of medications (Done)  2. I will f/u with provider as recommended. (Done)  3. I will stay active and social.    08/13, CHW:    Marie reports that she continues to take her medications.    However, she still continues to have her tremors and states that her middle finger on right hand has been \"locking up\" patient believes it may be due to her arthritis.        Intervention and Education during outreach:   CHW inquired how her leg ws doing as from last outreach patient had a large scab and skin tear.  Marie reports that the office called her the next day and had a phone appointment, referred her for Wound HC RN.  Patient states that the RN that cam out, [place] on 07/12 @12:30PM came to see Marie, stated that she wasn't a wound care RN, and will let the office know to send someone else out.  However, Marie never heard back from anyone.  Marie ended up taking care of the wound her self, which has healed but scabbed over.    CHW inquired if there was any other support or resources needed at this time.  Marie states that she has been feeling a little lonely lately since they're building is being quarantined.  However, states that she's been in touch with her family over the phone.  Marie shared that she was looking to give away her van to someone in need, she wanted " to give it away to a disabled  with a family.  [facility], however, they only offered to [facility] the van so they can sell and the money will go back to the association, which is not what Marie wanted.  Her , however, was able to find a  that had just come back for [place] and injured from the war, he has a wife and 3 kids and was getting ready to get a therapy dog and their car had just recently .  Marie states that she had given her van to them and transferred everything over.  Writer expressed karlee and amazement of patients act of giving.    CHW Plan: We discussed next follow up in 1 month. I will try to report home care experience.    Richard Zelaya, CHW  Clinic Care Coordination  [facility] : [place], and [place]  Phone: [phone]    ______________________  Next Outreach:  09/10/20  Planned Outreach Frequency: Monthly  Preferred Phone Number: [phone]    Last Assessment Date: 19  Care Plan Completion Date: 19  ______________________

## 2020-09-03 ENCOUNTER — HOSPITAL ENCOUNTER (EMERGENCY)
Facility: CLINIC | Age: 85
Discharge: HOME | End: 2020-09-03
Attending: NURSE PRACTITIONER | Admitting: NURSE PRACTITIONER
Payer: MEDICARE

## 2020-09-03 VITALS
OXYGEN SATURATION: 92 % | HEART RATE: 72 BPM | WEIGHT: 94 LBS | BODY MASS INDEX: 18.99 KG/M2 | TEMPERATURE: 98.5 F | DIASTOLIC BLOOD PRESSURE: 83 MMHG | SYSTOLIC BLOOD PRESSURE: 137 MMHG | RESPIRATION RATE: 18 BRPM

## 2020-09-03 DIAGNOSIS — S81.819A SKIN TEAR OF LOWER LEG WITHOUT COMPLICATION: ICD-10-CM

## 2020-09-03 PROCEDURE — 99282 EMERGENCY DEPT VISIT SF MDM: CPT

## 2020-09-03 RX ORDER — METHYLCELLULOSE 4000CPS 30 %
POWDER (GRAM) MISCELLANEOUS
Status: DISCONTINUED
Start: 2020-09-03 | End: 2020-09-03 | Stop reason: HOSPADM

## 2020-09-03 RX ORDER — METHYLCELLULOSE 4000CPS 30 %
POWDER (GRAM) MISCELLANEOUS ONCE
Status: DISCONTINUED | OUTPATIENT
Start: 2020-09-03 | End: 2020-09-03 | Stop reason: HOSPADM

## 2020-09-03 RX ORDER — GINSENG 100 MG
CAPSULE ORAL ONCE
Status: DISCONTINUED | OUTPATIENT
Start: 2020-09-03 | End: 2020-09-03 | Stop reason: HOSPADM

## 2020-09-03 ASSESSMENT — ENCOUNTER SYMPTOMS
COLOR CHANGE: 1
NUMBNESS: 0
WOUND: 1
WEAKNESS: 0

## 2020-09-03 NOTE — ED TRIAGE NOTES
Pt reports bumping Lt leg into table at home. Skin tear anterior lower leg. No active bleeding. Pt denies blood thinner use. Pt has baseline COPD and appears winded with ambulation. ABC in tact. A/OX4

## 2020-09-03 NOTE — ED PROVIDER NOTES
History     Chief Complaint:  Skin Tear    HPI   Marie Kline is a 88 year old female who presents with evaluation of skin tear. The patient states that she went to the bathroom around 11am and was turning when she hit her leg on the bolt of her walker causing the skin tear. She states that the area is tender. There is no active bleeding. The patient states that the skin is very thin and tears easily. She has multiple bruises on her lower extremities.     Allergies:  CT Dye   Prolia   Wound dressing adhesive     Medications:    Albuterol inhaler   Albuterol neb solution   Aspirin 81 mg   Lipitor   Flonase   Lasix   Levothyroxine   Metoprolol   Dulera   Zoloft  Desyrel    Past Medical History:    Arthritis   Bladder cancer   Cardiomyopahty   Chornic lymphocytic leukemia   CHF   COPD   Hypertension   Hypothyroid   H/o mumps   Myocardial infarction   Tricuspid   CAD  Fracture of pelvis  Pneumonia   NSTEMI  Purpura senilis   CKD  Sepsis  Anixeyt   DDD   Osteoporosis    Past Surgical History:    Biopsy of right inguinal lymph node  Bladder surery   Coronary angiography   CV left heart cath   Cystoscopy x2  Biopsy of bladder   Cystoscopy, transurethral resection   EGD  Open reduction internal fixation hip bipolar, left     Family History:    Cerebrovascular disease - mother   Cancer - father     Social History:  Smoking Status: Former Smoker  Smokeless Tobacco: Never Used  Alcohol Use: Negative  Marital Status:        Review of Systems   Skin: Positive for color change and wound.   Neurological: Negative for weakness and numbness.   All other systems reviewed and are negative.      Physical Exam     Patient Vitals for the past 24 hrs:   BP Temp Temp src Pulse Resp SpO2 Weight   09/03/20 1557 137/83 98.5  F (36.9  C) Oral 72 18 92 % 42.6 kg (94 lb)       Physical Exam  General: Alert, No obvious discomfort, well kept   HENT:  Normal voice, No lymphadenopathy  Eyes:  The pupils are equal, round, and reactive to  light, Conjunctiva normal, No scleral icterus   Neck:  Normal range of motion  CV:  Normal Pulses  Resp:  Non-labored, No cough  MS:  Normal muscular tone, moves all extremities  Skin:  Approximately 3 cm x 2 cm skin tear proximal third of left lower extremity.  No significant surrounding bruising or edema.  Bleeding is controlled.  Neuro:  Speech is normal and fluent  Psych: Awake. Alert.  Normal affect.  Appropriate interactions. Good eye contact      Emergency Department Course     Emergency Department Course:     Nursing notes and vitals reviewed.    1709 I performed an exam of the patient as documented above.     Wound care completed.    1855 Findings and plan explained to the Patient. Patient discharged home with instructions regarding supportive care, medications, and reasons to return. The importance of close follow-up was reviewed.    Impression & Plan      Medical Decision Making:  Marie Kline is a 88 year old female who presents to the emergency department today for evaluation of skin tear type laceration.  This was cleaned after analgesia as above.  Skin was then replaced over the wound and wound was covered with a nonadhesive dressing.  There was no indication for further testing or imagery.  This appears to be a simple mechanical skin tear type laceration that was nonrepairable due to the nature of the wound.  She appears to be safe and appropriate for outpatient management and follow-up.  Return protocols and signs and symptoms of infection were discussed.  She is discharged home.    Diagnosis:    ICD-10-CM   1. Skin tear of lower leg without complication  S81.819A     Disposition:   Patient is discharged home.     Scribe Disclosure:  IXochitl, am serving as a scribe at 6:14 PM on 9/3/2020 to document services personally performed by Otto Kauffman APRN CNP based on my observations and the provider's statements to me.  [facility] EMERGENCY DEPARTMENT       Otto Kauffman  NOHEMI Merchant CNP  09/03/20 2020

## 2020-09-03 NOTE — ED AVS SNAPSHOT
[facility] Emergency Department  [address]  [facility] [address]  Phone:  [phone]  Fax:  [phone]                                    Marie Kline   MRN: [id number]    Department:  [facility] Emergency Department   Date of Visit:  9/3/2020           After Visit Summary Signature Page    I have received my discharge instructions, and my questions have been answered. I have discussed any challenges I see with this plan with the nurse or doctor.    ..........................................................................................................................................  Patient/Patient Representative Signature      ..........................................................................................................................................  Patient Representative Print Name and Relationship to Patient    ..................................................               ................................................  Date                                   Time    ..........................................................................................................................................  Reviewed by Signature/Title    ...................................................              ..............................................  Date                                               Time          22EPIC Rev 08/18

## 2020-09-03 NOTE — ED NOTES
L shin skin tear irrigagted, bacatracin applied, non-adhesive, gauze applied. Clean, dry, intact.

## 2020-09-08 ENCOUNTER — TRANSFERRED RECORDS (OUTPATIENT)
Dept: HEALTH INFORMATION MANAGEMENT | Facility: CLINIC | Age: 85
End: 2020-09-08

## 2020-09-09 ENCOUNTER — PATIENT OUTREACH (OUTPATIENT)
Dept: CARE COORDINATION | Facility: CLINIC | Age: 85
End: 2020-09-09

## 2020-09-09 NOTE — PROGRESS NOTES
Clinic Care Coordination Contact  [facility] [[email or web address]]/[email or web address]       Clinical Data: Care Coordinator Outreach  Patient presented to the [facility] ED on 9.3.2020 with a Skin tear. Patient was discharged to home.     Outreach attempted x 1.  Left message on patient's voicemail with call back information and requested return call.  Plan: Care Coordinator will try to reach patient again in 1-2 business days.      Rogelio Casey [facility]  Clinic Care Coordinator  Ph. [phone]  [email or web address]

## 2020-09-10 ENCOUNTER — OFFICE VISIT (OUTPATIENT)
Dept: INTERNAL MEDICINE | Facility: CLINIC | Age: 85
End: 2020-09-10
Payer: MEDICARE

## 2020-09-10 ENCOUNTER — PATIENT OUTREACH (OUTPATIENT)
Dept: NURSING | Facility: CLINIC | Age: 85
End: 2020-09-10
Payer: MEDICARE

## 2020-09-10 VITALS
OXYGEN SATURATION: 95 % | RESPIRATION RATE: 16 BRPM | WEIGHT: 95.1 LBS | TEMPERATURE: 98.4 F | SYSTOLIC BLOOD PRESSURE: 124 MMHG | DIASTOLIC BLOOD PRESSURE: 62 MMHG | HEART RATE: 65 BPM | HEIGHT: 59 IN | BODY MASS INDEX: 19.17 KG/M2

## 2020-09-10 DIAGNOSIS — T14.8XXA MULTIPLE SKIN TEARS: Primary | ICD-10-CM

## 2020-09-10 DIAGNOSIS — Z23 NEED FOR PROPHYLACTIC VACCINATION AND INOCULATION AGAINST INFLUENZA: ICD-10-CM

## 2020-09-10 PROCEDURE — 90662 IIV NO PRSV INCREASED AG IM: CPT | Performed by: NURSE PRACTITIONER

## 2020-09-10 PROCEDURE — 99213 OFFICE O/P EST LOW 20 MIN: CPT | Performed by: NURSE PRACTITIONER

## 2020-09-10 PROCEDURE — G0008 ADMIN INFLUENZA VIRUS VAC: HCPCS | Performed by: NURSE PRACTITIONER

## 2020-09-10 ASSESSMENT — ACTIVITIES OF DAILY LIVING (ADL): DEPENDENT_IADLS:: TRANSPORTATION;SHOPPING;CLEANING

## 2020-09-10 ASSESSMENT — MIFFLIN-ST. JEOR: SCORE: 767

## 2020-09-10 NOTE — PROGRESS NOTES
"Subjective     Marie Kline is a 88 year old female who presents to clinic today for the following health issues:    HPI       ED/UC Followup:    Facility:  [facility]  Date of visit: 09/03/2020  Reason for visit: skin tear  Current Status: stable.  Unfortunately dry dressings sticking to wound and pulling edges up           Review of Systems   Constitutional, HEENT, cardiovascular, pulmonary, gi and gu systems are negative, except as otherwise noted.      Objective    /62 (BP Location: Right arm, Patient Position: Sitting, Cuff Size: Adult Regular)   Pulse 65   Temp 98.4  F (36.9  C) (Oral)   Resp 16   Ht 1.499 m (4' 11\")   Wt 43.1 kg (95 lb 1.6 oz)   LMP  (LMP Unknown)   SpO2 95%   BMI 19.21 kg/m    Body mass index is 19.21 kg/m .  Physical Exam   GENERAL: alert, no distress, frail and elderly  SKIN: L shin with 2 superficial abrasions the most proximal is large dog earred tear that has tissue covering it that is becoming necrotic.            Assessment & Plan     (T14.8XXA) Multiple skin tears  (primary encounter diagnosis)  Comment:   Plan: adaptic vaseline dressing with non stick telfa and wrapped in mónica gauze, change daily.  PCP f/u if not improving.           See Patient Instructions        Leah Ventura NP  [facility]      "

## 2020-09-10 NOTE — LETTER
[place] CARE COORDINATION  [address]  [facility] [place] [address]  September 10, 2020        Marie Kline  [address]  [facility] [address]          Dear Skyler Salazar is an updated Complex Care Plan for your continued enrollment in Care Coordination. Please let us know if you have additional questions, concerns, or goals that we can assist with.    Sincerely,      Rogelio Casey, [facility]  Clinic Care Coordinator  Ph. [phone]  [email or web address]  Complex Care Plan  About Me:    Patient Name:  Marie Kline    YOB: 1932  Age:         88 year old   [place] MRN:    [id number] Telephone Information:  Home Phone [phone]   Mobile [phone]       Address:  [address] [place] [address] [address]  [facility] [address] Email address:  [email or web address]      Emergency Contact(s)    Name Relationship Lgl Grd Work Phone Home Phone Mobile Phone   1. CHRISTIANMARICARMEN RACHEL* Daughter No  [phone]   2. EMILIA BUCHANAN Grandchild No  [phone]   3. MITTELSTEADT, * Relative No  [phone]           Primary language:  English     needed? No   [place] [facility]:  [phone] op. 1  Other communication barriers: None  Preferred Method of Communication:  Mail  Current living arrangement: I live alone, I live in assisted living  Mobility Status/ Medical Equipment: Independent w/Device    Health Maintenance  Health Maintenance Reviewed: Not assessed  Health Maintenance Due   Topic Date Due     HF ACTION PLAN  04/28/1932     ZOSTER IMMUNIZATION (1 of 2) 04/28/1982     MEDICARE ANNUAL WELLNESS VISIT  04/28/1997     LIPID  05/01/2020     INFLUENZA VACCINE (1) 09/01/2020       My Access Plan  Medical Emergency 911   Primary Clinic Line [facility] - [phone]   24 Hour Appointment Line [phone] or  [phone]) (toll-free)   24 Hour Nurse Line  [phone] (toll-free)   [facility], [phone]   Preferred Hospital [facility]  [phone]   Preferred Pharmacy [id number] - [place], [place] - [address]     Behavioral Health Crisis Line The National Suicide Prevention Lifeline at 1-797.952.6485 or 911         My Care Team Members  Patient Care Team       Relationship Specialty Notifications Start End    Piper Kiser MD PCP - General Internal Medicine  11/11/14     Phone: [phone] Fax: [phone]         [address] [facility] [address]    Isabel Ivory MD MD Oncology  2/6/17     Phone: [phone] Fax: [phone]         [place] [facility]  [address] [address]    Yuliana Cast [facility] Pharmacist Pharmacist  4/16/19     Phone: [phone] Pager: [phone]         [address] [facility] [address] [facility] [address]    Richard Hanna Community Health Worker Primary Care - CC  7/30/19     [place]; Ph: [phone], Fax: [phone]    Rogelio Gaming JOSIAS Lead Care Coordinator   6/16/20     Piper Kiser MD Assigned PCP   7/19/20     Phone: [phone] Fax: [phone]         [address] [facility] [address]    Marilee Arias [facility] Pharmacist Pharmacist  8/31/20     Phone: [phone] Fax: [phone]         [address] [facility] [address]            My Care Plans  Self Management and Treatment Plan  Goals and (Comments)  Goals        General    3. 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  Date goal set: 10/23/19   Measure of Success: Patient will have decreased tremors that prohibit her from painting.  Barriers: Tremors/dizziness  Strengths: positive attitude.  Good support system.  Social. Caty.  Date to Achieve By: 12/31/2020  Patient expressed  "understanding of goal: yes    Action steps to achieve this goal  1. I will speak to MTM about SE of medications   2. I will f/u with provider as recommended.  3. I will stay active and social.    As of today's date 5/13/2020 goal is met at 51 - 75%.   Goal Status:  Ongoing  As of today's date 4/7/2020 goal is met at 51 - 75%.   Goal Status:  Ongoing  As of today's date 3/16/2020 goal is met at 51 - 75%.   Goal Status:  Showing progress  Patient has recently increased social activities, but now has cut back completely due to \"lock down\" at [facility].  RN CC will continue to follow patient closely.    As of today's date 11/22/2019 goal is met at 26 - 50%.   Goal Status:  Ongoing  Talked to Dr Kiser about this, but stated she did not give any new Rx's for this. Schedule to see Neulogist on 11/26/19.        4. Medical (pt-stated)     Notes - Note created  9/10/2020 11:50 AM by Rogelio Gaming, ELIESER    Goal Statement: I want my skin tears to heal within 6 months.  Date Goal set: 9.10.2020  Barriers: Medical conditions impacting fragility, many opportunities for injury.  Strengths: Strong support system, recognition of need, coordination with PCP office to manage cares and needs.  Date to Achieve By: 3.10.2020  Patient expressed understanding of goal: Pt reports understanding and denies any additional questions or concerns at this times. SW CC engaged in AIDET communication during encounter.    Action steps to achieve this goal:  1. I will manage wound care until PCP appointment.  2. I will follow-up with primary care regarding plan of care.  3. I will follow wound care recommendations until healed.               Action Plans on File:         COPD             Advance Care Plans/Directives Type:   Type Advanced Care Plans/Directives: POLST    My Medical and Care Information  Problem List   Patient Active Problem List   Diagnosis     Acute and chronic respiratory failure with hypercapnia (HCC)     Nonischemic " cardiomyopathy (H)     Pneumonia     Acute myocardial infarction, initial episode of care (HCC)     CLL (chronic lymphocytic leukemia) (HCC)     CHF (congestive heart failure) (HCC)     Cardiomyopathy in other diseases classified elsewhere (HCC)     Hyperlipidemia with target LDL less than 130     Health Care Home     COPD exacerbation (H)     LESLY (generalized anxiety disorder)     Hypothyroidism     Back pain with radiation     DDD (degenerative disc disease), lumbar     Splenomegaly     Lumbar degenerative disc disease     Lumbar foraminal stenosis     Central spinal stenosis     Bladder cancer (H)     Sepsis (H)     Senile osteoporosis     CKD (chronic kidney disease) stage 3, GFR 30-59 ml/min (H)     Purpura senilis (H)     Xerosis cutis     Malignant neoplasm of urinary bladder, unspecified site (H)     Hypoxia     Femoral neck fracture (H)     S/P total hip arthroplasty     NSTEMI (non-ST elevated myocardial infarction) (H)     Stress-induced cardiomyopathy     COPD with acute exacerbation (H)     Shoulder fracture     Mouth sores     Closed nondisplaced fracture of pelvis with routine healing, unspecified part of pelvis, subsequent encounter     Pelvic hematoma in female     Closed head injury, subsequent encounter     Multiple skin tears     Pneumonia of left lung due to infectious organism, unspecified part of lung     Chest pain     Coronary artery disease involving native heart with angina pectoris, unspecified vessel or lesion type (H)      Current Medications and Allergies:  See printed Medication Report.  Current Outpatient Medications   Medication Instructions     albuterol (PROVENTIL HFA) 108 (90 Base) MCG/ACT inhaler 2 puffs, Inhalation, EVERY 6 HOURS PRN     albuterol (PROVENTIL) 2.5 mg, Nebulization, EVERY 6 HOURS PRN     aspirin 81 mg, Oral, DAILY     atorvastatin (LIPITOR) 20 MG tablet TAKE ONE TABLET BY MOUTH ONE TIME DAILY      ferrous sulfate (FEROSUL) 325 mg, Oral, DAILY WITH BREAKFAST      fluticasone (FLONASE) 50 MCG/ACT nasal spray 1 spray, Both Nostrils, DAILY     furosemide (LASIX) 20 MG tablet TAKE ONE TABLET BY MOUTH ONE TIME DAILY      Immune Globulin, Human, (OCTAGAM) 5 GM/100ML SOLN 300 mg/kg into the vein every 30 days<BR><BR>Hold this medication while in TCU-resume at discharge from TCU     levothyroxine (SYNTHROID/LEVOTHROID) 75 MCG tablet TAKE ONE TABLET BY MOUTH ONE TIME DAILY      metoprolol succinate ER (TOPROL-XL) 25 MG 24 hr tablet TAKE ONE TABLET BY MOUTH ONE TIME DAILY      mometasone-formoterol (DULERA) 200-5 MCG/ACT inhaler 2 puffs, Inhalation, 2 TIMES DAILY     Multiple Vitamins-Minerals (MULTIVITAMIN ADULT) CHEW 2 chew tab, Oral, DAILY     order for DME Equipment being ordered: Oxygen 2LNC continuous with portability tank due to mobility in the home. <BR><BR>Length of need is 99 months.     order for DME Equipment being ordered: Oxygen concentrator, O2-85% resting room air & 02-82% walking room air, Flow rate 2L     sertraline (ZOLOFT) 50 mg, Oral, DAILY     sodium chloride (OCEAN) 0.65 % nasal spray 1 spray, Both Nostrils, EVERY 1 HOUR PRN     traZODone (DESYREL) 50 mg, Oral, AT BEDTIME       Care Coordination Start Date: 5/16/2019   Frequency of Care Coordination: monthly   Form Last Updated: 09/10/2020

## 2020-09-14 ENCOUNTER — TELEPHONE (OUTPATIENT)
Dept: INTERNAL MEDICINE | Facility: CLINIC | Age: 85
End: 2020-09-14

## 2020-09-14 DIAGNOSIS — Z51.89 VISIT FOR WOUND CARE: Primary | ICD-10-CM

## 2020-09-14 NOTE — TELEPHONE ENCOUNTER
Please triage- see if new fevers or chills, surrounding redness, etc.    Leah Ventura saw patient recently.    I have not seen the tears.    Wonder if she needs wound clinic?

## 2020-09-14 NOTE — TELEPHONE ENCOUNTER
Patient is calling because she is having pain when she walks due to her wounds on her leg. She was seen in the ED and in our office about this. She is wondering if she needs to be on a antibiotic. There are no in person appointments to offer this patient and not sure if a virtual would be appropriate.

## 2020-09-14 NOTE — TELEPHONE ENCOUNTER
FYI~ A refill has been made to the  assistance program for Dulera.    A 90 day supply of Dulera will be delivered to Marie's home within 7-10 business days.    Thank you,    Vandana Gonzalez  Prescription Assistance

## 2020-09-15 RX ORDER — DOXYCYCLINE HYCLATE 100 MG
100 TABLET ORAL 2 TIMES DAILY
Qty: 20 TABLET | Refills: 0 | Status: SHIPPED | OUTPATIENT
Start: 2020-09-15 | End: 2021-01-06

## 2020-09-15 NOTE — TELEPHONE ENCOUNTER
Will prescribe doxycycline to see if this helps since patient thinks she needs one    If no improvement, agree with wound care.

## 2020-09-15 NOTE — TELEPHONE ENCOUNTER
Patient sustained skin tears to her left lower leg and was seen in ER for them on 9/3/20.  Patient was then seen in clinic to follow up on the wounds 9/10/20.  She reports 2 skin tears on left lower leg, she has been using antibiotic ointment and a non-adhering dressing changing daily.   Wounds continue to bleed when she removes the dressings even though she is using a telfa type dressing.  Denies fever or purulent drainage from wounds.  She continues with oozing blood from a hematoma left lower leg.  When she is sitting she elevates her feet, the only time she has pain is when she is bearing weight.  Rates pain 5-6 out of 10.  She wonders if she needs an antibiotic.      Patient reports she is home bound, could we order home care for this patient for wound assessment, assistance with dressing changes?  ZAID Sin R.N.

## 2020-09-15 NOTE — TELEPHONE ENCOUNTER
Patient advised of antibiotic rx being sent to pharmacy.  She still does want home care ordered for wound monitoring and dressing changes.     No need to call back if ordering home care.   ZAID Sin R.N.

## 2020-09-16 ENCOUNTER — TELEPHONE (OUTPATIENT)
Dept: CARE COORDINATION | Facility: CLINIC | Age: 85
End: 2020-09-16

## 2020-09-16 NOTE — TELEPHONE ENCOUNTER
Patient had been contacted by home care intake person and has decided to have RN visit her.  She is to be seen Sun. 9/20/20.  ZAID Sin R.N.

## 2020-09-16 NOTE — TELEPHONE ENCOUNTER
I had already ordered.    Recommend wound care to stay ahead of things and since I did not get to see her.

## 2020-09-16 NOTE — TELEPHONE ENCOUNTER
Mary Kiser,  I spoke with Marie, and have convinced her to let us come out to see her.  She said she will accept that visit, and we agreed on Sunday 9\20,  She is not available Saturday due to family event ([facility] generation [facility]  Thank you in advance for reply to message for OK on updated homecare begin date.    SN 1 visit on 9\20 for comprehensive homecare assessment, wound care.    Sincerely,  Alison Clay RN, BSN  [facility]   [phone]

## 2020-09-17 ENCOUNTER — PATIENT OUTREACH (OUTPATIENT)
Dept: CARE COORDINATION | Facility: CLINIC | Age: 85
End: 2020-09-17

## 2020-09-17 NOTE — PROGRESS NOTES
Clinic Care Coordination Contact     Situation:  Patient chart reviewed by Community Health Worker.    Chart Review:  CHW attempted to contact patient today for CC outreach.  However, upon chart review, Rogelio Casey, CC SW, had just spoken to patient on 09/10/20 and goals were addressed at this time.     Plan/Recommendations: CHW will extend outreach for 1 month to prevent duplicate CC outreaches.     KOREY Lawrence  Clinic Care Coordination  [facility] : [place], [place], [place], and [place]  Phone: [phone]    ______________________  Next Outreach:  10/08/20  Planned Outreach Frequency: Monthly  Preferred Phone Number: [phone]    Last Assessment Date: 05/16/19  Care Plan Completion Date: 09/10/20  ______________________

## 2020-09-22 ENCOUNTER — TELEPHONE (OUTPATIENT)
Dept: INTERNAL MEDICINE | Facility: CLINIC | Age: 85
End: 2020-09-22

## 2020-09-22 NOTE — TELEPHONE ENCOUNTER
[facility] now requests orders and shares plan of care/discharge summaries for some patients through [employer].  Please REPLY TO THIS MESSAGE OR ROUTE BACK TO THE AUTHOR in order to give authorization for orders when needed.  This is considered a verbal order, you will still receive a faxed copy of orders for signature.  Thank you for your assistance in improving collaboration for our patients.    START OF HOME CARE ORDERS  SN   2 week 1, 3 week 1, 2 week 2, 3 as needed      To assess/educate on wound healing and perform cares to skin tear and trauma wounds on bilateral lower legs, skin integrity/symptoms/disease management, nutrition/ hydration status, and home/fall safety measures in home.     Ariadna Lorenzo RN   [phone]  [email or web address]

## 2020-10-05 NOTE — DISCHARGE INSTRUCTIONS
Ina Martínez is a 62 y.o. female who was seen by synchronous (real-time) audio-video technology on 10/5/2020. Consent: Ina Martínez, who was seen by synchronous (real-time) audio-video technology, and/or her healthcare decision maker, is aware that this patient-initiated, Telehealth encounter on 10/5/2020 is a billable service, with coverage as determined by her insurance carrier. She is aware that she may receive a bill and has provided verbal consent to proceed: Yes. Assessment & Plan:   Diagnoses and all orders for this visit:    1. Chronic bilateral low back pain with right-sided sciatica    2. Primary osteoarthritis of right knee  -     oxyCODONE-acetaminophen (PERCOCET 10)  mg per tablet; Take 1 Tab by mouth every twelve (12) hours as needed for Pain for up to 30 days. Max Daily Amount: 2 Tabs. Indications: pain  -     oxyCODONE-acetaminophen (PERCOCET 10)  mg per tablet; Take 1 Tab by mouth two (2) times daily as needed for Pain for up to 30 days. Max Daily Amount: 2 Tabs. Indications: pain  -     oxyCODONE-acetaminophen (PERCOCET 10)  mg per tablet; Take 1 Tab by mouth two (2) times daily as needed for Pain for up to 30 days. Max Daily Amount: 2 Tabs. Indications: pain    3. Status post total right knee replacement  -     oxyCODONE-acetaminophen (PERCOCET 10)  mg per tablet; Take 1 Tab by mouth every twelve (12) hours as needed for Pain for up to 30 days. Max Daily Amount: 2 Tabs. Indications: pain  -     oxyCODONE-acetaminophen (PERCOCET 10)  mg per tablet; Take 1 Tab by mouth two (2) times daily as needed for Pain for up to 30 days. Max Daily Amount: 2 Tabs. Indications: pain  -     oxyCODONE-acetaminophen (PERCOCET 10)  mg per tablet; Take 1 Tab by mouth two (2) times daily as needed for Pain for up to 30 days. Max Daily Amount: 2 Tabs. Indications: pain    4.  Chronic use of opiate for therapeutic purpose  -     oxyCODONE-acetaminophen (PERCOCET 10) Your Follow-up appointment is on Tuesday March 28, at 11:00 am with Dr. Moreno at the [facility] in [place].     mg per tablet; Take 1 Tab by mouth every twelve (12) hours as needed for Pain for up to 30 days. Max Daily Amount: 2 Tabs. Indications: pain  -     oxyCODONE-acetaminophen (PERCOCET 10)  mg per tablet; Take 1 Tab by mouth two (2) times daily as needed for Pain for up to 30 days. Max Daily Amount: 2 Tabs. Indications: pain  -     oxyCODONE-acetaminophen (PERCOCET 10)  mg per tablet; Take 1 Tab by mouth two (2) times daily as needed for Pain for up to 30 days. Max Daily Amount: 2 Tabs. Indications: pain    5. Primary osteoarthritis of left shoulder  -     oxyCODONE-acetaminophen (PERCOCET 10)  mg per tablet; Take 1 Tab by mouth every twelve (12) hours as needed for Pain for up to 30 days. Max Daily Amount: 2 Tabs. Indications: pain  -     oxyCODONE-acetaminophen (PERCOCET 10)  mg per tablet; Take 1 Tab by mouth two (2) times daily as needed for Pain for up to 30 days. Max Daily Amount: 2 Tabs. Indications: pain  -     oxyCODONE-acetaminophen (PERCOCET 10)  mg per tablet; Take 1 Tab by mouth two (2) times daily as needed for Pain for up to 30 days. Max Daily Amount: 2 Tabs. Indications: pain    6. GERD without esophagitis  -     ondansetron (ZOFRAN ODT) 4 mg disintegrating tablet; DISSOLVE 1 TABLET BY MOUTH EVERY 8 HOURS AS NEEDED FOR NAUSEA    7. Abdominal wall abscess  -     trimethoprim-sulfamethoxazole (Bactrim DS) 160-800 mg per tablet; Take 1 Tab by mouth two (2) times a day for 7 days. Follow-up and Dispositions    · Return in about 13 weeks (around 1/4/2021) for OV- pain med refill. Discussed with pt use of mild soap and water and topical antibiotics. NOT using objects to puncture skin when abscesses noted, but use of warm compresses instead. Prescribed Bactrim for probable MRSA/staph infection. Encouraged to see GI for persistent nausea and be sure to take PPI until seen.  Otherwise refilled meds and advised pain exacerbation likely 2/2 acute stress, running out of meds, and cooler weather happening concurrently. Subjective:   Noemy Johnson is a 62 y.o. female who was seen for Medication Refill (pain )      Pt presents to f/u chronic pain med refill for pain in right knee, shoulder, and lower back with sciatica. Taking oxycodone, last taken 2 days ago, ran out. Feels worse lately with sciatic pain for past few days. Had abscess to abdominal wall, expressed drainage a few days ago. Cleansed with peroxide and applied vaseline, but feels it looks \"terrible\". Denies side effects from medication. Needs med refill on zofran also, persist with nausea. Will see GI soon with plan for procedure. Reports taking protonix in packs sent. Prior to Admission medications    Medication Sig Start Date End Date Taking? Authorizing Provider   ondansetron (ZOFRAN ODT) 4 mg disintegrating tablet DISSOLVE 1 TABLET BY MOUTH EVERY 8 HOURS AS NEEDED FOR NAUSEA 10/5/20  Yes Avinash Dao NP   trimethoprim-sulfamethoxazole (Bactrim DS) 160-800 mg per tablet Take 1 Tab by mouth two (2) times a day for 7 days. 10/5/20 10/12/20 Yes Avinash Dao NP   oxyCODONE-acetaminophen (PERCOCET 10)  mg per tablet Take 1 Tab by mouth every twelve (12) hours as needed for Pain for up to 30 days. Max Daily Amount: 2 Tabs. Indications: pain 12/4/20 1/3/21 Yes Avinash Dao NP   oxyCODONE-acetaminophen (PERCOCET 10)  mg per tablet Take 1 Tab by mouth two (2) times daily as needed for Pain for up to 30 days. Max Daily Amount: 2 Tabs. Indications: pain 11/4/20 12/4/20 Yes Avinash Dao NP   oxyCODONE-acetaminophen (PERCOCET 10)  mg per tablet Take 1 Tab by mouth two (2) times daily as needed for Pain for up to 30 days. Max Daily Amount: 2 Tabs. Indications: pain 10/5/20 11/4/20 Yes Avinash Dao NP   traZODone (DESYREL) 50 mg tablet Take 1 Tab by mouth nightly.  12/3/19  Yes Cesar Hugo, NP   XOLAIR 150 mg solr Indications: injection 8/20/19  Yes Provider, Historical   tiotropium (SPIRIVA WITH HANDIHALER) 18 mcg inhalation capsule Take 1 Cap by inhalation daily. Yes Other, MD Salazar   loratadine (CLARITIN) 10 mg tablet TAKE 1 TABLET BY MOUTH EVERY DAY 9/17/20   Jono Johnson NP   methocarbamoL (ROBAXIN) 750 mg tablet TAKE 1 TAB BY MOUTH EVERY 6 HOURS AS NEEDED FOR SPASMS 7/17/20   Jono Johnson NP   hydroCHLOROthiazide (HYDRODIURIL) 25 mg tablet TAKE 1 TAB BY MOUTH DAILY FOR HYPERTENSION 7/6/20   Jono Johnson NP   gabapentin (NEURONTIN) 800 mg tablet Take 1 Tab by mouth three (3) times daily. Max Daily Amount: 2,400 mg. 7/2/20   Jono Johnson NP   pantoprazole (PROTONIX) 40 mg tablet Take 1 Tab by mouth daily. Take in 2 week intervals for GERD Flares 7/2/20   Jono Johnson NP   acetaminophen (TYLENOL) 650 mg TbER Take 1 Tab by mouth every eight (8) hours. 7/2/20   Jono Johnson NP   levothyroxine (SYNTHROID) 125 mcg tablet TAKE 1 TABLET BY MOUTH EVERY DAY BEFORE BREAKFAST INDICATIONS: HYPOTHYROIDISM  Indications: a condition with low thyroid hormone levels 7/2/20   Jono Johnson NP   amLODIPine (NORVASC) 5 mg tablet Take 1 Tab by mouth daily. 7/2/20   Jono Johnson NP   oxyCODONE-acetaminophen (PERCOCET 10)  mg per tablet Take 1 Tab by mouth every twelve (12) hours as needed for Pain for up to 30 days. Max Daily Amount: 2 Tabs. Indications: pain 9/2/20 10/5/20  Jono Johnson NP   oxyCODONE-acetaminophen (PERCOCET 10)  mg per tablet Take 1 Tab by mouth two (2) times daily as needed for Pain for up to 30 days. Max Daily Amount: 2 Tabs. Indications: pain 8/2/20 10/5/20  Jono Johnson NP   oxyCODONE-acetaminophen (PERCOCET 10)  mg per tablet Take 1 Tab by mouth two (2) times daily as needed for Pain for up to 30 days. Max Daily Amount: 2 Tabs.  Indications: pain 7/2/20 10/5/20  Jono Johnson NP   Combivent Respimat  mcg/actuation inhaler TAKE 1 PUFF BY INHALATION EVERY SIX (6) HOURS AS NEEDED FOR WHEEZING. 4/13/20   Jono Johnson NP   Symbicort 160-4.5 mcg/actuation HFAA TAKE 2 PUFFS BY INHALATION TWO (2) TIMES A DAY. 3/20/20   Eduar Gr NP   azelastine (ASTELIN) 137 mcg (0.1 %) nasal spray  12/2/19   Roel Howell   DULoxetine (CYMBALTA) 30 mg capsule Take 2 Caps by mouth two (2) times a day. 12/3/19   Juany Hugo NP   ondansetron (ZOFRAN ODT) 4 mg disintegrating tablet DISSOLVE 1 TABLET BY MOUTH EVERY 8 HOURS AS NEEDED FOR NAUSEA 12/2/19 10/5/20  Eduar Gr NP   metoprolol succinate (TOPROL-XL) 25 mg XL tablet TAKE 1 TABLET BY MOUTH EVERY DAY 10/2/19   Eduar Gr NP   meloxicam (MOBIC) 15 mg tablet TAKE 1 TABLET BY MOUTH EVERY DAY IN THE MORNING WITH BREAKFAST 9/16/19   Eduar Gr NP   hydrOXYzine HCl (ATARAX) 25 mg tablet TAKE 1 TABLET BY MOUTH 3 TIMES A DAY AS NEEDED FOR ITCHING FOR ANXIETY 8/21/19   Yuriy Childs MD   furosemide (LASIX) 20 mg tablet TAKE 1 TABLET BY MOUTH DAILY X 3-5 DAYS FOR LEG SWELLING INDICATIONS: VISIBLE WATER RETENTION 8/21/19   Yuriy Childs MD   montelukast (SINGULAIR) 10 mg tablet TAKE 1 TAB BY MOUTH DAILY. 7/25/19   Eduar Gr NP   lidocaine 4 % patch Every 12 hours as needed for pain 5/12/19   Colton Escobedo, Nicolette CADET, PA   PROAIR HFA 90 mcg/actuation inhaler TAKE 2 PUFFS BY INHALATION EVERY FOUR (4) HOURS AS NEEDED. 3/9/19   Eduar Gr NP   lidocaine (LIDODERM) 5 % Apply patch to the affected area for 12 hours a day and remove for 12 hours a day. 1/8/19   Eduar Gr NP   naloxone [facility]) 4 mg/actuation nasal spray Use 1 spray into 1 nostril for OVERDOSE. Call 911. For subsequent doses, give in alternating nostrils. May repeat every 2 to 3 min. 3/28/18   Eduar Gr NP   albuterol (PROVENTIL VENTOLIN) 2.5 mg /3 mL (0.083 %) nebulizer solution 3 mL by Nebulization route every four (4) hours as needed for Wheezing. 1/31/18   Eduar Gr NP   diclofenac (VOLTAREN) 1 % gel Apply 2 g to affected area every six (6) hours.  11/3/17   Eduar Gr NP   fluticasone (FLONASE) 50 mcg/actuation nasal spray 2 Sprays by Both Nostrils route daily. 12/9/16   Urban SHAGGY Mandujano   miscellaneous medical supply misc Shower seat for chronic knee pain, pt planning to have right TKR in June due to condition. Very limited range of motion. 5/16/16   Urban SHAGGY Mandujano     Allergies   Allergen Reactions    Hydrocodone Itching    Mold Shortness of Breath and Itching    Ibuprofen Nausea Only       Patient Active Problem List   Diagnosis Code    Ventral hernia K43.9    Chronic pain of right knee M25.561, G89.29    Chronic right shoulder pain M25.511, G89.29    Environmental and seasonal allergies J30.89    Ventral hernia without obstruction or gangrene K43.9    Primary osteoarthritis of right knee M17.11    Mixed simple and mucopurulent chronic bronchitis (HCC) J41.8    Acquired hypothyroidism E03.9    Chronic bilateral low back pain with right-sided sciatica M54.41, G89.29    Status post total right knee replacement Z96.651    Malignant hypertension I10    Pain management contract signed Z02.89    Chronic pain of left knee M25.562, G89.29    Moderate episode of recurrent major depressive disorder (HCC) F33.1    Psychophysiological insomnia F51.04    Severe obesity (BMI 35.0-39. 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    S/P total knee arthroplasty, right Z96.651    Loosening of knee joint prosthesis ([address] T84.038A, Z96.659     Patient Active Problem List    Diagnosis Date Noted    S/P total knee arthroplasty, right 03/26/2019    Loosening of knee joint prosthesis ([address] 03/26/2019    MRSA carrier 03/06/2019    Osteoarthritis of spine with radiculopathy, cervical region 09/26/2018    Primary osteoarthritis of left shoulder 09/26/2018    Obesity, Class II, BMI 35-39.9 09/25/2018    Left wrist pain 09/25/2018    Chronic left shoulder pain 09/25/2018    Chronic use of opiate for therapeutic purpose 05/24/2018    Severe obesity (BMI 35.0-39. 9) with comorbidity ([facility] 03/28/2018    Post-tussive vomiting 03/28/2018    Moderate episode of recurrent major depressive disorder ([facility] 02/28/2018    Psychophysiological insomnia 02/28/2018    Chronic pain of left knee 06/15/2017    Chronic bilateral low back pain with right-sided sciatica 05/08/2017    Status post total right knee replacement 05/08/2017    Malignant hypertension 05/08/2017    Pain management contract signed 05/08/2017    Acquired hypothyroidism 01/06/2017    Mixed simple and mucopurulent chronic bronchitis ([facility] 09/08/2016    Primary osteoarthritis of right knee 06/06/2016    Ventral hernia without obstruction or gangrene 05/26/2016    Chronic pain of right knee 02/05/2016    Chronic right shoulder pain 02/05/2016    Environmental and seasonal allergies 02/05/2016    Ventral hernia 12/31/2013     Current Outpatient Medications   Medication Sig Dispense Refill    ondansetron (ZOFRAN ODT) 4 mg disintegrating tablet DISSOLVE 1 TABLET BY MOUTH EVERY 8 HOURS AS NEEDED FOR NAUSEA 20 Tab 1    trimethoprim-sulfamethoxazole (Bactrim DS) 160-800 mg per tablet Take 1 Tab by mouth two (2) times a day for 7 days. 14 Tab 0    [START ON 12/4/2020] oxyCODONE-acetaminophen (PERCOCET 10)  mg per tablet Take 1 Tab by mouth every twelve (12) hours as needed for Pain for up to 30 days. Max Daily Amount: 2 Tabs. Indications: pain 45 Tab 0    [START ON 11/4/2020] oxyCODONE-acetaminophen (PERCOCET 10)  mg per tablet Take 1 Tab by mouth two (2) times daily as needed for Pain for up to 30 days. Max Daily Amount: 2 Tabs.  Indications: pain 45 Tab 0    oxyCODONE-acetaminophen (PERCOCET 10)  mg per tablet Take 1 Tab by mouth two (2) times daily as needed for Pain for up to 30 days. Max Daily Amount: 2 Tabs. Indications: pain 45 Tab 0    traZODone (DESYREL) 50 mg tablet Take 1 Tab by mouth nightly. 90 Tab 0    XOLAIR 150 mg solr Indications: injection      tiotropium (SPIRIVA WITH HANDIHALER) 18 mcg inhalation capsule Take 1 Cap by inhalation daily.  loratadine (CLARITIN) 10 mg tablet TAKE 1 TABLET BY MOUTH EVERY DAY 30 Tab 8    methocarbamoL (ROBAXIN) 750 mg tablet TAKE 1 TAB BY MOUTH EVERY 6 HOURS AS NEEDED FOR SPASMS 120 Tab 6    hydroCHLOROthiazide (HYDRODIURIL) 25 mg tablet TAKE 1 TAB BY MOUTH DAILY FOR HYPERTENSION 90 Tab 3    gabapentin (NEURONTIN) 800 mg tablet Take 1 Tab by mouth three (3) times daily. Max Daily Amount: 2,400 mg. 90 Tab 5    pantoprazole (PROTONIX) 40 mg tablet Take 1 Tab by mouth daily. Take in 2 week intervals for GERD Flares 30 Tab 1    acetaminophen (TYLENOL) 650 mg TbER Take 1 Tab by mouth every eight (8) hours. 270 Tab 3    levothyroxine (SYNTHROID) 125 mcg tablet TAKE 1 TABLET BY MOUTH EVERY DAY BEFORE BREAKFAST INDICATIONS: HYPOTHYROIDISM  Indications: a condition with low thyroid hormone levels 90 Tab 3    amLODIPine (NORVASC) 5 mg tablet Take 1 Tab by mouth daily. 90 Tab 3    Combivent Respimat  mcg/actuation inhaler TAKE 1 PUFF BY INHALATION EVERY SIX (6) HOURS AS NEEDED FOR WHEEZING. 1 Inhaler 0    Symbicort 160-4.5 mcg/actuation HFAA TAKE 2 PUFFS BY INHALATION TWO (2) TIMES A DAY. 30.6 Inhaler 3    azelastine (ASTELIN) 137 mcg (0.1 %) nasal spray       DULoxetine (CYMBALTA) 30 mg capsule Take 2 Caps by mouth two (2) times a day.  180 Cap 0    metoprolol succinate (TOPROL-XL) 25 mg XL tablet TAKE 1 TABLET BY MOUTH EVERY DAY 90 Tab 3    meloxicam (MOBIC) 15 mg tablet TAKE 1 TABLET BY MOUTH EVERY DAY IN THE MORNING WITH BREAKFAST 30 Tab 11    hydrOXYzine HCl (ATARAX) 25 mg tablet TAKE 1 TABLET BY MOUTH 3 TIMES A DAY AS NEEDED FOR ITCHING FOR ANXIETY 60 Tab 0    furosemide (LASIX) 20 mg tablet TAKE 1 TABLET BY MOUTH DAILY X 3-5 DAYS FOR LEG SWELLING INDICATIONS: VISIBLE WATER RETENTION 15 Tab 0    montelukast (SINGULAIR) 10 mg tablet TAKE 1 TAB BY MOUTH DAILY. 30 Tab 6    lidocaine 4 % patch Every 12 hours as needed for pain 20 Patch 0    PROAIR HFA 90 mcg/actuation inhaler TAKE 2 PUFFS BY INHALATION EVERY FOUR (4) HOURS AS NEEDED. 8.5 Inhaler 0    lidocaine (LIDODERM) 5 % Apply patch to the affected area for 12 hours a day and remove for 12 hours a day. 30 Each 11    naloxone (NARCAN) 4 mg/actuation nasal spray Use 1 spray into 1 nostril for OVERDOSE. Call 911. For subsequent doses, give in alternating nostrils. May repeat every 2 to 3 min. 2 Each 0    albuterol (PROVENTIL VENTOLIN) 2.5 mg /3 mL (0.083 %) nebulizer solution 3 mL by Nebulization route every four (4) hours as needed for Wheezing. 24 Each 2    diclofenac (VOLTAREN) 1 % gel Apply 2 g to affected area every six (6) hours. 100 g 5    fluticasone (FLONASE) 50 mcg/actuation nasal spray 2 Sprays by Both Nostrils route daily. 1 Bottle 11    miscellaneous medical supply misc Shower seat for chronic knee pain, pt planning to have right TKR in June due to condition. Very limited range of motion.  1 Each 0     Allergies   Allergen Reactions    Hydrocodone Itching    Mold Shortness of Breath and Itching    Ibuprofen Nausea Only     Past Medical History:   Diagnosis Date    Arthritis     Asthma     uses inhalers    Chronic obstructive pulmonary disease (HCC)     bronchitis    Chronic pain     GERD (gastroesophageal reflux disease)     Hypertension     Ill-defined condition     environmental allergies     Ill-defined condition     Multiple body piercings; unable to remove tongue and lip piercings    Ill-defined condition 2018    GASTRITIS PER ENDOSCOPY    MRSA carrier 3/6/2019    Psychiatric disorder     DEPRESSION    Thyroid disease     hypo    Ventral hernia 2013     Past Surgical History:   Procedure Laterality Date    HX HEENT  2009    thyroidectomy    HX KNEE REPLACEMENT      R    HX KNEE REPLACEMENT  2019    HX MOHS PROCEDURES Right 3/8/11    HX OTHER SURGICAL      hiatal hernia repair    HX TUBAL LIGATION       Family History   Problem Relation Age of Onset    Cancer Father         lung cancer    Heart Disease Mother     Hypertension Mother     Diabetes Mother     Anesth Problems Neg Hx      Social History     Tobacco Use    Smoking status: Current Every Day Smoker     Packs/day: 0.25     Years: 1.50     Pack years: 0.37     Types: Cigarettes     Last attempt to quit: 10/1/2015     Years since quittin.0    Smokeless tobacco: Never Used   Substance Use Topics    Alcohol use: No       Review of Systems   Constitutional: Negative for fever and malaise/fatigue. Eyes: Negative for blurred vision. Respiratory: Negative for cough and shortness of breath. Cardiovascular: Negative for chest pain and leg swelling. Neurological: Negative for dizziness, weakness and headaches. Objective:   Vital Signs: (As obtained by patient/caregiver at home)  Visit Vitals  LMP 2016 (Exact Date) Comment: partial hysterectomy              Physical Exam:  General appearance - alert, well appearing, and in mild distress. Mental status - A/O x 4,normal mood and affect. Eyes- trace periorbital edema, drainage, or irritation noted. Nose- no obvious drainage or swelling. Throat- no obvious swelling, goiter, or notable lymphadenopathy  Chest - Symmetric chest rise. No wheezing or coughing. No distress. Skin- normal skin tone noted. No hyperpigmentation or obvious deformities. No diaphoresis noted. No flushing. Neuro - Normal speech, no focal findings or movement disorder. Other pertinent observable physical exam findings:-  Right lower abdomen with scaling periskin and ulceration noted with shiny appearance like drainage.  Unable to note color. No redness appreciated on video observation. Some hyperpigmentation noted however. Pt reports tenderness to area. We discussed the expected course, resolution and complications of the diagnosis(es) in detail. Medication risks, benefits, costs, interactions, and alternatives were discussed as indicated. I advised her to contact the office if her condition worsens, changes or fails to improve as anticipated. She expressed understanding with the diagnosis(es) and plan. Kike Gutierrez is a 62 y.o. female who was evaluated by a video visit encounter for concerns as above. Patient identification was verified prior to start of the visit. A caregiver was present when appropriate. Due to this being a TeleHealth encounter (During APRDT-48 public health emergency), evaluation of the following organ systems was limited: Vitals/Constitutional/EENT/Resp/CV/GI//MS/Neuro/Skin/Heme-Lymph-Imm. Pursuant to the emergency declaration under the [facility], [facility]5 waiver authority and the [employer] and [employer] Act, this Virtual  Visit was conducted, with patient's (and/or legal guardian's) consent, to reduce the patient's risk of exposure to COVID-19 and provide necessary medical care. Services were provided through a video synchronous discussion virtually to substitute for in-person clinic visit. Patient and provider were located at their individual homes.       Gi Crawford NP

## 2020-10-09 ENCOUNTER — TELEPHONE (OUTPATIENT)
Dept: INTERNAL MEDICINE | Facility: CLINIC | Age: 85
End: 2020-10-09

## 2020-10-12 DIAGNOSIS — Z53.9 DIAGNOSIS NOT YET DEFINED: Primary | ICD-10-CM

## 2020-10-12 PROCEDURE — 99207 PR MD CERTIFICATION HHA PATIENT: CPT | Performed by: INTERNAL MEDICINE

## 2020-10-14 ENCOUNTER — PATIENT OUTREACH (OUTPATIENT)
Dept: NURSING | Facility: CLINIC | Age: 85
End: 2020-10-14
Payer: MEDICARE

## 2020-10-14 SDOH — SOCIAL STABILITY: SOCIAL INSECURITY
WITHIN THE LAST YEAR, HAVE YOU BEEN KICKED, HIT, SLAPPED, OR OTHERWISE PHYSICALLY HURT BY YOUR PARTNER OR EX-PARTNER?: NO

## 2020-10-14 SDOH — ECONOMIC STABILITY: FOOD INSECURITY: WITHIN THE PAST 12 MONTHS, YOU WORRIED THAT YOUR FOOD WOULD RUN OUT BEFORE YOU GOT MONEY TO BUY MORE.: NEVER TRUE

## 2020-10-14 SDOH — ECONOMIC STABILITY: TRANSPORTATION INSECURITY
IN THE PAST 12 MONTHS, HAS LACK OF TRANSPORTATION KEPT YOU FROM MEETINGS, WORK, OR FROM GETTING THINGS NEEDED FOR DAILY LIVING?: NO

## 2020-10-14 SDOH — SOCIAL STABILITY: SOCIAL INSECURITY: WITHIN THE LAST YEAR, HAVE YOU BEEN AFRAID OF YOUR PARTNER OR EX-PARTNER?: NO

## 2020-10-14 SDOH — SOCIAL STABILITY: SOCIAL INSECURITY
WITHIN THE LAST YEAR, HAVE TO BEEN RAPED OR FORCED TO HAVE ANY KIND OF SEXUAL ACTIVITY BY YOUR PARTNER OR EX-PARTNER?: NO

## 2020-10-14 SDOH — ECONOMIC STABILITY: FOOD INSECURITY: WITHIN THE PAST 12 MONTHS, THE FOOD YOU BOUGHT JUST DIDN'T LAST AND YOU DIDN'T HAVE MONEY TO GET MORE.: NEVER TRUE

## 2020-10-14 SDOH — SOCIAL STABILITY: SOCIAL NETWORK
IN A TYPICAL WEEK, HOW MANY TIMES DO YOU TALK ON THE PHONE WITH FAMILY, FRIENDS, OR NEIGHBORS?: MORE THAN THREE TIMES A WEEK

## 2020-10-14 SDOH — ECONOMIC STABILITY: TRANSPORTATION INSECURITY
IN THE PAST 12 MONTHS, HAS THE LACK OF TRANSPORTATION KEPT YOU FROM MEDICAL APPOINTMENTS OR FROM GETTING MEDICATIONS?: NO

## 2020-10-14 SDOH — ECONOMIC STABILITY: INCOME INSECURITY: HOW HARD IS IT FOR YOU TO PAY FOR THE VERY BASICS LIKE FOOD, HOUSING, MEDICAL CARE, AND HEATING?: NOT HARD AT ALL

## 2020-10-14 SDOH — SOCIAL STABILITY: SOCIAL INSECURITY: WITHIN THE LAST YEAR, HAVE YOU BEEN HUMILIATED OR EMOTIONALLY ABUSED IN OTHER WAYS BY YOUR PARTNER OR EX-PARTNER?: NO

## 2020-10-14 SDOH — HEALTH STABILITY: MENTAL HEALTH
STRESS IS WHEN SOMEONE FEELS TENSE, NERVOUS, ANXIOUS, OR CAN'T SLEEP AT NIGHT BECAUSE THEIR MIND IS TROUBLED. HOW STRESSED ARE YOU?: NOT AT ALL

## 2020-10-14 ASSESSMENT — ACTIVITIES OF DAILY LIVING (ADL): DEPENDENT_IADLS:: TRANSPORTATION;SHOPPING;CLEANING

## 2020-10-14 NOTE — PROGRESS NOTES
"Clinic Care Coordination Contact    Follow Up Progress Note      Assessment: JOSIAS CC spoke with Marie to follow up with her after home care discharge. She has one sore area that is not healed yet on her leg.  She and the home care nurse made the plan to use bacitracin on it and to keep it covered with a bandage.  She believes that she has all that she needs at the present time and she appreciated working with home care.  She continues to have her infusioin once per month and her son brings her to those appointments.      She lost her son on 20 who passed away after batting brain cancer. This is the second child she has lost the first being a daughter who  at the age of 12.  She reports that her strong caty has carried her through.        She has not been paining lately due to being busy with other projects.     Goals addressed this encounter:   Goals Addressed                 This Visit's Progress       Patient Stated      3. 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  Date goal set: 10/23/19   Measure of Success: Patient will have decreased tremors that prohibit her from painting.  Barriers: Tremors/dizziness  Strengths: positive attitude.  Good support system.  Social. Caty.  Date to Achieve By: 2020  Patient expressed understanding of goal: yes    Action steps to achieve this goal  1. I will speak to MTM about SE of medications   2. I will f/u with provider as recommended.  3. I will stay active and social.    As of today's date 2020 goal is met at 51 - 75%.   Goal Status:  Ongoing  As of today's date 2020 goal is met at 51 - 75%.   Goal Status:  Ongoing  As of today's date 3/16/2020 goal is met at 51 - 75%.   Goal Status:  Showing progress  Patient has recently increased social activities, but now has cut back completely due to \"lock down\" at [facility].  RN CC will continue to follow patient closely.    As of today's date " 11/22/2019 goal is met at 26 - 50%.   Goal Status:  Ongoing  Talked to Dr Kiser about this, but stated she did not give any new Rx's for this. Schedule to see Neulogist on 11/26/19.          4. Medical (pt-stated)        Goal Statement: I want my skin tears to heal within 6 months.  Date Goal set: 9.10.2020  Barriers: Medical conditions impacting fragility, many opportunities for injury.  Strengths: Strong support system, recognition of need, coordination with PCP office to manage cares and needs.  Date to Achieve By: 3.10.2021  Patient expressed understanding of goal: Pt reports understanding and denies any additional questions or concerns at this times. JOSIAS CC engaged in AIDET communication during encounter.    Action steps to achieve this goal:  1. I will manage wound care until PCP appointment.  2. I will follow-up with primary care regarding plan of care.  3. I will follow wound care recommendations until healed.               Social Determinants of Health      Tobacco Use  Medium Risk     Alcohol Use  Not on file        Financial Resource Strain  Low Risk      Food Insecurity  No Food Insecurity        Transportation Needs  No Transportation Needs     Physical Activity  Not on file        Stress  No Stress Concern Present     Social Connections  Unknown        Intimate Partner Violence  Not At Risk     Depression  Not at risk        Housing Stability  Not on file             Find community resources         Intervention/Education provided during outreach: NA     Outreach Frequency: monthly    Plan: Marie plans to be careful to not develop new sores and to continue to care for the sore on her leg which has not fully healed yet.      Care Coordination team will follow up in 3 - 4    TINO Velez Care Coordination Team  [phone]   weeks.

## 2020-10-15 ENCOUNTER — PATIENT OUTREACH (OUTPATIENT)
Dept: CARE COORDINATION | Facility: CLINIC | Age: 85
End: 2020-10-15

## 2020-10-15 NOTE — PROGRESS NOTES
Clinic Care Coordination Contact     Situation:  Patient chart reviewed by Community Health Worker.    Chart Review:  CHW attempted to contact patient today for CC outreach.  However, upon chart review, Kerry Sanderson, CC JOSIAS, had just contacted patient on 10/14/20, and goals were addressed at that time.     Plan/Recommendations: CHW will extend outreach for 1 month to prevent duplicate CC outreaches.     KOREY Lawrence  Clinic Care Coordination  [facility] Clinics : [place], [place], [place], and [place]  Phone: [phone]    ______________________  Next Outreach:  11/13/20  Planned Outreach Frequency: Monthly  Preferred Phone Number: [phone]    Last Assessment Date: 05/16/19  Care Plan Completion Date: 09/10/20  ______________________

## 2020-11-05 ENCOUNTER — TELEPHONE (OUTPATIENT)
Dept: INTERNAL MEDICINE | Facility: CLINIC | Age: 85
End: 2020-11-05

## 2020-11-05 NOTE — TELEPHONE ENCOUNTER
FYI~ A refill has been made to the  assistance program for Dulera & Proventil HFA.    A 90 day supply of Dulera & Proventil HFA will be delivered to Marie's home within 7-10 business days.    Thank you,    Vandana Gonzalez  Prescription Assistance

## 2020-11-11 DIAGNOSIS — F51.01 PRIMARY INSOMNIA: ICD-10-CM

## 2020-11-11 DIAGNOSIS — I50.9 CHRONIC CONGESTIVE HEART FAILURE, UNSPECIFIED HEART FAILURE TYPE (H): ICD-10-CM

## 2020-11-11 RX ORDER — FUROSEMIDE 20 MG
TABLET ORAL
Qty: 90 TABLET | Refills: 1 | Status: SHIPPED | OUTPATIENT
Start: 2020-11-11 | End: 2021-01-06

## 2020-11-11 RX ORDER — TRAZODONE HYDROCHLORIDE 50 MG/1
TABLET, FILM COATED ORAL
Qty: 90 TABLET | Refills: 0 | Status: SHIPPED | OUTPATIENT
Start: 2020-11-11 | End: 2021-01-06

## 2020-11-11 NOTE — TELEPHONE ENCOUNTER
Routing refill request to provider for review/approval because:  Medication is reported/historical  Dewayne Spangler RN, BSN

## 2020-11-18 ENCOUNTER — TELEPHONE (OUTPATIENT)
Dept: INTERNAL MEDICINE | Facility: CLINIC | Age: 85
End: 2020-11-18

## 2020-11-18 DIAGNOSIS — J20.9 ACUTE BRONCHITIS, UNSPECIFIED ORGANISM: Primary | ICD-10-CM

## 2020-11-18 NOTE — TELEPHONE ENCOUNTER
Patient states she had 8 teeth pulled and was on a antibiotic from the dentist that she finished yesterday.  Patient states she has COPD and is coughing up green.  No fever, sore throats, body aches or fatigue, no loss of taste or smell.  No travel or known exposure.  Please address and advise.

## 2020-11-19 RX ORDER — AZITHROMYCIN 250 MG/1
TABLET, FILM COATED ORAL
Qty: 6 TABLET | Refills: 0 | Status: SHIPPED | OUTPATIENT
Start: 2020-11-19 | End: 2020-11-24

## 2020-11-24 ENCOUNTER — PATIENT OUTREACH (OUTPATIENT)
Dept: CARE COORDINATION | Facility: CLINIC | Age: 85
End: 2020-11-24

## 2020-11-24 NOTE — PROGRESS NOTES
Clinic Care Coordination Contact  [facility]/Voicemail       Clinical Data: Care Coordinator Outreach  Outreach attempted x 1.  Left message on patient's voicemail with call back information and requested return call.    Plan:  Care Coordinator will try to reach patient again in 10 business days.    KOREY Lawrence  Clinic Care Coordination  [facility] Clinics : [facility], [place], and [place]  Phone: [phone]    ______________________  Next Outreach:  12/08/20  Planned Outreach Frequency: Monthly  Preferred Phone Number: [phone]    Last Assessment Date: 05/16/19  Care Plan Completion Date: 09/10/20  ______________________

## 2020-12-07 ENCOUNTER — PATIENT OUTREACH (OUTPATIENT)
Dept: CARE COORDINATION | Facility: CLINIC | Age: 85
End: 2020-12-07

## 2020-12-07 ASSESSMENT — ACTIVITIES OF DAILY LIVING (ADL): DEPENDENT_IADLS:: TRANSPORTATION;SHOPPING;CLEANING

## 2020-12-07 NOTE — LETTER
[facility]  [address]  [facility] [address]  December 7, 2020        Marie Kline  [address]  [facility] [address]          Dear Skyler Salazar is an updated Complex Care Plan for your continued enrollment in Care Coordination. Please let us know if you have additional questions, concerns, or goals that we can assist with.    Sincerely,      Rogelio Casey [facility]  Clinic Care Coordinator  Ph. [phone]  [email or web address]  Complex Care Plan  About Me:    Patient Name:  Marie Kline    YOB: 1932  Age:         88 year old   [place] MRN:    [id number] Telephone Information:  Home Phone [phone]   Mobile [phone]       Address:  [address] [place] [address] [address]  [facility] [address] Email address:  [email or web address]      Emergency Contact(s)    Name Relationship Lgl Grd Work Phone Home Phone Mobile Phone   1. CHRISTIANMARICARMEN RACHEL* Daughter No  [phone]   2. EMILIA BUCHANAN Grandchild No  [phone]   3. MITTELSTEADT, * Relative No  [phone]           Primary language:  English     needed? No   [place] [employer]:  [phone] op. 1  Other communication barriers: None  Preferred Method of Communication:  Mail  Current living arrangement: I live alone, I live in assisted living  Mobility Status/ Medical Equipment: Independent w/Device    Health Maintenance  Health Maintenance Reviewed:   Health Maintenance Due   Topic Date Due     HF ACTION PLAN  04/28/1932     ZOSTER IMMUNIZATION (1 of 2) 04/28/1982     MEDICARE ANNUAL WELLNESS VISIT  04/28/1997     LIPID  05/01/2020     CBC  10/15/2020     BMP  12/30/2020       My Access Plan  Medical Emergency 911   Primary Clinic Line [facility] - [phone]   24 Hour Appointment Line [phone] or  [phone]) (toll-free)   24 Hour Nurse Line [phone]  (toll-free)   Preferred Urgent Care [facility] [place], [phone]   Preferred Hospital [facility]  [phone]   Preferred Pharmacy [id number] - [place], [place] - [address] Behavioral Health Crisis Line The National Suicide Prevention Lifeline at 1-636.534.2252 or 911       My Care Team Members  Patient Care Team       Relationship Specialty Notifications Start End    Piper Kiser MD PCP - General Internal Medicine  11/11/14     Phone: [phone] Fax: [phone]         [address] [facility] [address]    Isabel Ivory MD MD Oncology  2/6/17     Phone: [phone] Fax: [phone]         [place] [facility]  [address] [facility] [address]    Yuliana Cast [facility] Pharmacist Pharmacist  4/16/19     Phone: [phone] Pager: [phone]         [address] [facility] [address] [facility] [address]    Richard Hanna Community Health Worker Primary Care - CC  7/30/19     [place]; Ph: [phone], Fax: [phone]    Rogelio Gaming LGSW Lead Care Coordinator   6/16/20     Piper Kiser MD Assigned PCP   7/19/20     Phone: [phone] Fax: [phone]         [address] [place] [address]    Marilee Arias [facility] Pharmacist Pharmacist  8/31/20     Phone: [phone] Fax: [phone]         [address] [facility] [address]    Kar Nuñez MD Assigned Surgical Provider   10/23/20     Phone: [phone] Fax: [phone] [address] [place] [address]            My Care Plans  Self Management and Treatment Plan  Goals and (Comments)  Goals        General    3. 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  Date goal set: 10/23/19   Measure of Success: Patient will have decreased tremors that prohibit her from painting.  Barriers:  "Tremors/dizziness  Strengths: positive attitude.  Good support system.  Social. Caty.  Date to Achieve By: 12/31/2020  Patient expressed understanding of goal: yes    Action steps to achieve this goal  1. I will speak to MTM about SE of medications   2. I will f/u with provider as recommended.  3. I will stay active and social.    As of today's date 5/13/2020 goal is met at 51 - 75%.   Goal Status:  Ongoing  As of today's date 4/7/2020 goal is met at 51 - 75%.   Goal Status:  Ongoing  As of today's date 3/16/2020 goal is met at 51 - 75%.   Goal Status:  Showing progress  Patient has recently increased social activities, but now has cut back completely due to \"lock down\" at [facility].  RN CC will continue to follow patient closely.    As of today's date 11/22/2019 goal is met at 26 - 50%.   Goal Status:  Ongoing  Talked to Dr Kiser about this, but stated she did not give any new Rx's for this. Schedule to see Neulogist on 11/26/19.        4. Medical (pt-stated)     Notes - Note edited  10/14/2020  1:58 PM by Kerry Sanderson LSW    Goal Statement: I want my skin tears to heal within 6 months.  Date Goal set: 9.10.2020  Barriers: Medical conditions impacting fragility, many opportunities for injury.  Strengths: Strong support system, recognition of need, coordination with PCP office to manage cares and needs.  Date to Achieve By: 3.10.2021  Patient expressed understanding of goal: Pt reports understanding and denies any additional questions or concerns at this times. SW CC engaged in AIDET communication during encounter.    Action steps to achieve this goal:  1. I will manage wound care until PCP appointment.  2. I will follow-up with primary care regarding plan of care.  3. I will follow wound care recommendations until healed.               Action Plans on File:      COPD     Advance Care Plans/Directives Type:   Type Advanced Care Plans/Directives: POLST    My Medical and Care Information  Problem List   Patient " Active Problem List   Diagnosis     Acute and chronic respiratory failure with hypercapnia (HCC)     Nonischemic cardiomyopathy (H)     Pneumonia     Acute myocardial infarction, initial episode of care (HCC)     CLL (chronic lymphocytic leukemia) (HCC)     CHF (congestive heart failure) (HCC)     Cardiomyopathy in other diseases classified elsewhere (HCC)     Hyperlipidemia with target LDL less than 130     Health Care Home     COPD exacerbation (H)     LESLY (generalized anxiety disorder)     Hypothyroidism     Back pain with radiation     DDD (degenerative disc disease), lumbar     Splenomegaly     Lumbar degenerative disc disease     Lumbar foraminal stenosis     Central spinal stenosis     Bladder cancer (H)     Sepsis (H)     Senile osteoporosis     CKD (chronic kidney disease) stage 3, GFR 30-59 ml/min     Purpura senilis (H)     Xerosis cutis     Malignant neoplasm of urinary bladder, unspecified site (H)     Hypoxia     Femoral neck fracture (H)     S/P total hip arthroplasty     NSTEMI (non-ST elevated myocardial infarction) (H)     Stress-induced cardiomyopathy     COPD with acute exacerbation (H)     Shoulder fracture     Mouth sores     Closed nondisplaced fracture of pelvis with routine healing, unspecified part of pelvis, subsequent encounter     Pelvic hematoma in female     Closed head injury, subsequent encounter     Multiple skin tears     Pneumonia of left lung due to infectious organism, unspecified part of lung     Chest pain     Coronary artery disease involving native heart with angina pectoris, unspecified vessel or lesion type (H)      Current Medications and Allergies:  See printed Medication Report.  Current Outpatient Medications   Medication Instructions     albuterol (PROVENTIL HFA) 108 (90 Base) MCG/ACT inhaler 2 puffs, Inhalation, EVERY 6 HOURS PRN     albuterol (PROVENTIL) 2.5 mg, Nebulization, EVERY 6 HOURS PRN     aspirin 81 mg, Oral, DAILY     atorvastatin (LIPITOR) 20 MG tablet  TAKE ONE TABLET BY MOUTH ONE TIME DAILY      doxycycline hyclate (VIBRA-TABS) 100 mg, Oral, 2 TIMES DAILY     ferrous sulfate (FEROSUL) 325 mg, Oral, DAILY WITH BREAKFAST     fluticasone (FLONASE) 50 MCG/ACT nasal spray 1 spray, Both Nostrils, DAILY     furosemide (LASIX) 20 MG tablet TAKE ONE TABLET BY MOUTH ONE TIME DAILY      Immune Globulin, Human, (OCTAGAM) 5 GM/100ML SOLN 300 mg/kg into the vein every 30 days<BR><BR>Hold this medication while in TCU-resume at discharge from TCU     levothyroxine (SYNTHROID/LEVOTHROID) 75 MCG tablet TAKE ONE TABLET BY MOUTH ONE TIME DAILY      metoprolol succinate ER (TOPROL-XL) 25 MG 24 hr tablet TAKE ONE TABLET BY MOUTH ONE TIME DAILY      mometasone-formoterol (DULERA) 200-5 MCG/ACT inhaler 2 puffs, Inhalation, 2 TIMES DAILY     Multiple Vitamins-Minerals (MULTIVITAMIN ADULT) CHEW 2 chew tab, Oral, DAILY     order for DME Equipment being ordered: Oxygen 2LNC continuous with portability tank due to mobility in the home. <BR><BR>Length of need is 99 months.     order for DME Equipment being ordered: Oxygen concentrator, O2-85% resting room air & 02-82% walking room air, Flow rate 2L     sertraline (ZOLOFT) 50 mg, Oral, DAILY     sodium chloride (OCEAN) 0.65 % nasal spray 1 spray, Both Nostrils, EVERY 1 HOUR PRN     traZODone (DESYREL) 50 MG tablet Take 2 tablets by mouth At Bedtime       Care Coordination Start Date: 5/16/2019   Frequency of Care Coordination: monthly   Form Last Updated: 12/07/2020

## 2020-12-07 NOTE — PROGRESS NOTES
Clinic Care Coordination Contact    Situation: Patient chart reviewed by care coordinator.    Background: Patient is enrolled in Clinic Care Coordination with the goals of resuming painting, and healing from skin tears.    Assessment: CHW has been unable to reach Patient since last  CC outreach. Patient appears to continue to access appropriate wound care.    Plan/Recommendations: Updated Complex Care Plan sent to Patient on this date via [employer]. CHW will outreach as previously planned.  CC will remain available and will schedule a clinical review in 6 weeks.     Rogelio Casey [facility]  Clinic Care Coordinator  Ph. [phone]  [email or web address]

## 2020-12-16 ENCOUNTER — PATIENT OUTREACH (OUTPATIENT)
Dept: NURSING | Facility: CLINIC | Age: 85
End: 2020-12-16
Payer: MEDICARE

## 2020-12-16 ENCOUNTER — TELEPHONE (OUTPATIENT)
Dept: CARE COORDINATION | Facility: CLINIC | Age: 85
End: 2020-12-16

## 2020-12-16 DIAGNOSIS — M79.662 PAIN OF LEFT LOWER LEG: Primary | ICD-10-CM

## 2020-12-16 NOTE — TELEPHONE ENCOUNTER
Dr Kiser,    LUCY contacted patient today for Care Coordinatiion outreach. Upon my call patient states that she believes she might have a blood blot on her left leg from her previous injuries. Patient is not experiencing any pain at this time, but wondering if this is something to be worried about.  Please contact patient at your earliest convenience.    Thank you,    KOREY Lawrence  Clinic Care Coordination  [facility] Clinics : [place], [place], [place], and [place]  Phone: [phone]

## 2020-12-16 NOTE — PROGRESS NOTES
Clinic Care Coordination Contact  Community Health Worker Follow Up  Spoke to Marie    Goals:   Goals Addressed as of 12/16/2020 at 12:48 PM                 Today    10/14/20      3. 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  Date goal set: 10/23/19   Measure of Success: Patient will have decreased tremors that prohibit her from painting.  Barriers: Tremors/dizziness  Strengths: positive attitude.  Good support system.  Social. Caty.  Date to Achieve By: 12/31/2020  Patient expressed understanding of goal: yes    Action steps to achieve this goal  1. I will speak to MTM about SE of medications   2. I will f/u with provider as recommended.  3. I will stay active and social.    12/16, CHW:    Marie is still having tremors.    We discussed if this was still something that patient would like CC to continue monitoring.    However, it seemed like patient was not ready to give up yet, so we will continue to monitor patients progression.        4. Medical (pt-stated)   60%      Added 9/10/20 by Rogelio Gaming, SW     Goal Statement: I want my skin tears to heal within 6 months.  Date Goal set: 9.10.2020  Barriers: Medical conditions impacting fragility, many opportunities for injury.  Strengths: Strong support system, recognition of need, coordination with PCP office to manage cares and needs.  Date to Achieve By: 3.10.2021  Patient expressed understanding of goal: Pt reports understanding and denies any additional questions or concerns at this times. SW CC engaged in AIDET communication during encounter.    Action steps to achieve this goal:  1. I will manage wound care until PCP appointment.  2. I will follow-up with primary care regarding plan of care.  3. I will follow wound care recommendations until healed.      12/16, CHW:    Patient believes that she might have a blood clot on her left leg from  her previous wounds. She reports that it doesn't hurt until she accidentally bumps it or something.    Patients next follow up with Dr Kiser is not til January, so CHW will send an encounter to PCP.        Intervention and Education during outreach:   Patient reports that she's been doing well.  She has an appointment today to have 4 teeth pulled out. She's not excited about getting the teeth pulled, but she's happy that she'll be able to eat again.  Patient has no other concerns or needs at this time.  CHW encouraged patient to contact writer if there were any changes.  Patient acknowledged and expressed appreciation.    CHW Plan: CHW will send encounter to PCP regarding possible blood clot.  CHW will continue monthly outreaches to monitor progression of goals.    Richard Zelaya, LISSYW  Clinic Care Coordination  [facility] Clinics : [place], [place], and [place]  Phone: [phone]    ______________________  Next Outreach:  01/15/21  Planned Outreach Frequency: Monthly  Preferred Phone Number: [phone]    Last Assessment Date: 05/16/19  Care Plan Completion Date: 12/07/20  ______________________

## 2020-12-16 NOTE — TELEPHONE ENCOUNTER
Left message on cell/home voicemail asking patient to call back to be triaged.  Transfer to triage nurses please.  ZAID Sin R.N.

## 2020-12-17 NOTE — TELEPHONE ENCOUNTER
Patient was already called and voice mail left advising to schedule ultrasound per primary care provider per below.

## 2020-12-17 NOTE — TELEPHONE ENCOUNTER
"Call to patient. States she had dental surgery yesterday and dentist saw her leg and saw a bruise on her leg and said it looks like a blood clot. Area is on left lower leg to right of shin. States there is a bruise about 1.5 in x 2 in and there is a lump in the top part. The lump is visible and per patient looks like a \"small ball\". States it is protruding and black. States she bruises very easily and her skin is thin. Denies pain in the area at rest but is painful to touch. Denies swelling. Patient has a dentist appointment today at 3:30 for a denture alignment. OK to leave a detailed voice mail.   "

## 2020-12-17 NOTE — TELEPHONE ENCOUNTER
Reason for Call:  Other call back    Detailed comments: Pt returning phone call and attempted to reach out to a available Triage Nurse but none were available at the moment and would like a call back as soon as possible to address.    Phone Number Patient can be reached at: Home number on file [phone] (home)    Best Time: anytime    Can we leave a detailed message on this number? YES    Call taken on 12/17/2020 at 10:59 AM by Scar Rene

## 2020-12-17 NOTE — TELEPHONE ENCOUNTER
Would like left leg ultrasound today if possible since I am unable to evaluate her in person.    Ordered ultrasound.    Please make sure she has not experienced new chest pain or shortness of breath.

## 2020-12-17 NOTE — TELEPHONE ENCOUNTER
This is most likely a superficial contusion which will bruise and will look dark can be like a lump.  She can use warm packs and elevated but it will take a number of days to resolve.

## 2020-12-18 ENCOUNTER — HOSPITAL ENCOUNTER (OUTPATIENT)
Dept: ULTRASOUND IMAGING | Facility: CLINIC | Age: 85
Discharge: HOME | End: 2020-12-18
Attending: INTERNAL MEDICINE | Admitting: INTERNAL MEDICINE
Payer: MEDICARE

## 2020-12-18 DIAGNOSIS — M79.662 PAIN OF LEFT LOWER LEG: ICD-10-CM

## 2020-12-18 PROCEDURE — 93971 EXTREMITY STUDY: CPT | Mod: LT

## 2021-01-06 ENCOUNTER — OFFICE VISIT (OUTPATIENT)
Dept: INTERNAL MEDICINE | Facility: CLINIC | Age: 86
End: 2021-01-06
Payer: MEDICARE

## 2021-01-06 VITALS
BODY MASS INDEX: 17.88 KG/M2 | DIASTOLIC BLOOD PRESSURE: 70 MMHG | SYSTOLIC BLOOD PRESSURE: 120 MMHG | TEMPERATURE: 98.1 F | OXYGEN SATURATION: 94 % | WEIGHT: 88.69 LBS | HEART RATE: 70 BPM | HEIGHT: 59 IN | RESPIRATION RATE: 16 BRPM

## 2021-01-06 DIAGNOSIS — I24.9 ACS (ACUTE CORONARY SYNDROME) (H): ICD-10-CM

## 2021-01-06 DIAGNOSIS — F41.1 GAD (GENERALIZED ANXIETY DISORDER): ICD-10-CM

## 2021-01-06 DIAGNOSIS — E78.5 HYPERLIPIDEMIA WITH TARGET LDL LESS THAN 130: ICD-10-CM

## 2021-01-06 DIAGNOSIS — F51.01 PRIMARY INSOMNIA: ICD-10-CM

## 2021-01-06 DIAGNOSIS — J30.89 SEASONAL ALLERGIC RHINITIS DUE TO OTHER ALLERGIC TRIGGER: ICD-10-CM

## 2021-01-06 DIAGNOSIS — Z00.00 ROUTINE GENERAL MEDICAL EXAMINATION AT A HEALTH CARE FACILITY: Primary | ICD-10-CM

## 2021-01-06 DIAGNOSIS — I50.9 CHRONIC CONGESTIVE HEART FAILURE, UNSPECIFIED HEART FAILURE TYPE (H): ICD-10-CM

## 2021-01-06 PROCEDURE — 80061 LIPID PANEL: CPT | Performed by: INTERNAL MEDICINE

## 2021-01-06 PROCEDURE — G0438 PPPS, INITIAL VISIT: HCPCS | Performed by: INTERNAL MEDICINE

## 2021-01-06 PROCEDURE — 36415 COLL VENOUS BLD VENIPUNCTURE: CPT | Performed by: INTERNAL MEDICINE

## 2021-01-06 PROCEDURE — 80048 BASIC METABOLIC PNL TOTAL CA: CPT | Performed by: INTERNAL MEDICINE

## 2021-01-06 RX ORDER — ATORVASTATIN CALCIUM 20 MG/1
20 TABLET, FILM COATED ORAL DAILY
Qty: 90 TABLET | Refills: 4 | Status: SHIPPED | OUTPATIENT
Start: 2021-01-06 | End: 2021-10-06

## 2021-01-06 RX ORDER — FLUTICASONE PROPIONATE 50 MCG
1 SPRAY, SUSPENSION (ML) NASAL DAILY
Qty: 16 G | Refills: 0 | Status: SHIPPED | OUTPATIENT
Start: 2021-01-06 | End: 2021-09-30

## 2021-01-06 RX ORDER — FUROSEMIDE 20 MG
20 TABLET ORAL DAILY
Qty: 90 TABLET | Refills: 4 | Status: SHIPPED | OUTPATIENT
Start: 2021-01-06 | End: 2022-03-02

## 2021-01-06 RX ORDER — TRAZODONE HYDROCHLORIDE 50 MG/1
TABLET, FILM COATED ORAL
Qty: 90 TABLET | Refills: 4 | Status: SHIPPED | OUTPATIENT
Start: 2021-01-06 | End: 2021-03-24

## 2021-01-06 ASSESSMENT — ENCOUNTER SYMPTOMS
HEARTBURN: 0
DYSURIA: 0
WEAKNESS: 1
MYALGIAS: 0
CHILLS: 0
HEMATOCHEZIA: 0
FREQUENCY: 0
COUGH: 1
NERVOUS/ANXIOUS: 0
ARTHRALGIAS: 0
SHORTNESS OF BREATH: 1
DIZZINESS: 0
SORE THROAT: 0
PARESTHESIAS: 1
HEMATURIA: 0
JOINT SWELLING: 0
EYE PAIN: 0
FEVER: 0
ABDOMINAL PAIN: 0
HEADACHES: 1
BREAST MASS: 0
PALPITATIONS: 0
CONSTIPATION: 0
NAUSEA: 0
DIARRHEA: 1

## 2021-01-06 ASSESSMENT — ANXIETY QUESTIONNAIRES
2. NOT BEING ABLE TO STOP OR CONTROL WORRYING: SEVERAL DAYS
5. BEING SO RESTLESS THAT IT IS HARD TO SIT STILL: NOT AT ALL
7. FEELING AFRAID AS IF SOMETHING AWFUL MIGHT HAPPEN: NOT AT ALL
GAD7 TOTAL SCORE: 2
3. WORRYING TOO MUCH ABOUT DIFFERENT THINGS: NOT AT ALL
1. FEELING NERVOUS, ANXIOUS, OR ON EDGE: SEVERAL DAYS
IF YOU CHECKED OFF ANY PROBLEMS ON THIS QUESTIONNAIRE, HOW DIFFICULT HAVE THESE PROBLEMS MADE IT FOR YOU TO DO YOUR WORK, TAKE CARE OF THINGS AT HOME, OR GET ALONG WITH OTHER PEOPLE: NOT DIFFICULT AT ALL
6. BECOMING EASILY ANNOYED OR IRRITABLE: NOT AT ALL

## 2021-01-06 ASSESSMENT — ACTIVITIES OF DAILY LIVING (ADL)
CURRENT_FUNCTION: SHOPPING REQUIRES ASSISTANCE
CURRENT_FUNCTION: TRANSPORTATION REQUIRES ASSISTANCE

## 2021-01-06 ASSESSMENT — PATIENT HEALTH QUESTIONNAIRE - PHQ9: 5. POOR APPETITE OR OVEREATING: NOT AT ALL

## 2021-01-06 ASSESSMENT — MIFFLIN-ST. JEOR: SCORE: 737.93

## 2021-01-06 NOTE — PROGRESS NOTES
"SUBJECTIVE:   Marie Kline is a 88 year old female who presents for Preventive Visit.    Patient has been advised of split billing requirements and indicates understanding: Yes   Are you in the first 12 months of your Medicare coverage?  No    Healthy Habits:     In general, how would you rate your overall health?  Fair    Frequency of exercise:  1 day/week    Duration of exercise:  N/A    Do you usually eat at least 4 servings of fruit and vegetables a day, include whole grains    & fiber and avoid regularly eating high fat or \"junk\" foods?  Yes    Taking medications regularly:  Yes    Medication side effects:  None    Ability to successfully perform activities of daily living:  Transportation requires assistance and shopping requires assistance    Home Safety:  No safety concerns identified    Hearing Impairment:  Difficulty following a conversation in a noisy restaurant or crowded room, feel that people are mumbling or not speaking clearly, need to ask people to speak up or repeat themselves, find that men's voices are easier to understand than woman's and difficulty understanding soft or whispered speech    In the past 6 months, have you been bothered by leaking of urine?  No    In general, how would you rate your overall mental or emotional health?  Good      PHQ-2 Total Score: 0    Additional concerns today:  Yes    Bladder cancer.  She is seeing Dr. Nuñez in April.    CLL.  She follows with Dr. Ivory.      Do you feel safe in your environment? Yes    Have you ever done Advance Care Planning? (For example, a Health Directive, POLST, or a discussion with a medical provider or your loved ones about your wishes): Yes, advance care planning is on file.      Fall risk  Fallen 2 or more times in the past year?: No  Any fall with injury in the past year?: No    Cognitive Screening   1) Repeat 3 items (Leader, Season, Table)    2) Clock draw: NORMAL  3) 3 item recall: Recalls 3 objects  Results: 3 items recalled: " COGNITIVE IMPAIRMENT LESS LIKELY    Mini-CogTM Copyright SHANNAN Larsen. Licensed by the author for use in [facility]; reprinted with permission (wayne@.[facility]). All rights reserved.      Do you have sleep apnea, excessive snoring or daytime drowsiness?: yes    Reviewed and updated as needed this visit by clinical staff  Tobacco  Allergies  Meds  Problems  Med Hx  Surg Hx  Fam Hx  Soc Hx          Reviewed and updated as needed this visit by Provider   Allergies  Meds  Problems            Social History     Tobacco Use     Smoking status: Former Smoker     Types: Cigarettes     Quit date: 2/3/1996     Years since quittin.9     Smokeless tobacco: Never Used   Substance Use Topics     Alcohol use: No     Alcohol/week: 0.0 standard drinks         Alcohol Use 2021   Prescreen: >3 drinks/day or >7 drinks/week? No       Current providers sharing in care for this patient include:   Patient Care Team:  Piper Kiser MD as PCP - General (Internal Medicine)  Isabel Ivory MD as MD (Oncology)  Yuliana Cast [facility] as Pharmacist (Pharmacist)  Richard Hanna as Community Health Worker (Primary Care - CC)  Rogelio Gaming [facility] as Lead Care Coordinator  Piper Kiser MD as Assigned PCP  Marilee Arias RPH as Pharmacist (Pharmacist)  aKr Nuñez MD as Assigned Surgical Provider    The following health maintenance items are reviewed in Epic and correct as of today:  Health Maintenance   Topic Date Due     HF ACTION PLAN  1932     ZOSTER IMMUNIZATION (1 of 2) 1982     LIPID  2020     CBC  10/15/2020     BMP  2020     ALT  2021     TSH W/FREE T4 REFLEX  2021     FALL RISK ASSESSMENT  10/14/2021     MEDICARE ANNUAL WELLNESS VISIT  2022     DTAP/TDAP/TD IMMUNIZATION (2 - Td) 07/10/2024     ADVANCE CARE PLANNING  2026     SPIROMETRY  Completed     COPD ACTION PLAN  Completed     PHQ-2  Completed     INFLUENZA VACCINE  Completed  "    Pneumococcal Vaccine: Pediatrics (0 to 5 Years) and At-Risk Patients (6 to 64 Years)  Completed     Pneumococcal Vaccine: 65+ Years  Completed     IPV IMMUNIZATION  Aged Out     MENINGITIS IMMUNIZATION  Aged Out     HEPATITIS B IMMUNIZATION  Aged Out     Labs reviewed in EPIC      Review of Systems   Constitutional: Negative for chills and fever.   HENT: Positive for congestion and hearing loss. Negative for ear pain and sore throat.    Eyes: Negative for pain and visual disturbance.   Respiratory: Positive for cough and shortness of breath.    Cardiovascular: Positive for peripheral edema. Negative for chest pain and palpitations.   Gastrointestinal: Positive for diarrhea. Negative for abdominal pain, constipation, heartburn, hematochezia and nausea.   Breasts:  Negative for tenderness, breast mass and discharge.   Genitourinary: Negative for dysuria, frequency, genital sores, hematuria, pelvic pain, urgency, vaginal bleeding and vaginal discharge.   Musculoskeletal: Negative for arthralgias, joint swelling and myalgias.   Skin: Negative for rash.   Neurological: Positive for weakness, headaches and paresthesias. Negative for dizziness.   Psychiatric/Behavioral: Negative for mood changes. The patient is not nervous/anxious.          OBJECTIVE:   /70   Pulse 70   Temp 98.1  F (36.7  C) (Oral)   Resp 16   Ht 1.499 m (4' 11\")   Wt 40.2 kg (88 lb 11 oz)   LMP  (LMP Unknown)   SpO2 94%   Breastfeeding No   BMI 17.91 kg/m   Estimated body mass index is 17.91 kg/m  as calculated from the following:    Height as of this encounter: 1.499 m (4' 11\").    Weight as of this encounter: 40.2 kg (88 lb 11 oz).  Physical Exam  GENERAL APPEARANCE: healthy, alert and no distress  EYES: Eyes grossly normal to inspection, PERRL and conjunctivae and sclerae normal  HENT: ear canals and TM's normal, nose and mouth without ulcers or lesions, oropharynx clear and oral mucous membranes moist  NECK:adenopathy appreciated, " "no asymmetry, masses, or scars and thyroid normal to palpation  RESP: lungs clear to auscultation - no rales, rhonchi or wheezes  CV: regular rate and rhythm, normal S1 S2, no S3 or S4, no murmur, click or rub, no peripheral edema and peripheral pulses strong  ABDOMEN: soft, nontender, no hepatosplenomegaly, no masses and bowel sounds normal  MS: no musculoskeletal defects are noted and gait is age appropriate without ataxia  SKIN: no suspicious lesions or rashes; bruising appreciated  NEURO: Normal strength and tone, sensory exam grossly normal, mentation intact and speech normal  PSYCH: mentation appears normal and affect normal/bright    Diagnostic Test Results:  Labs reviewed in Epic    ASSESSMENT / PLAN:       ICD-10-CM    1. Routine general medical examination at a health care facility  Z00.00    2. LESLY (generalized anxiety disorder)  F41.1 sertraline (ZOLOFT) 50 MG tablet   3. Primary insomnia  F51.01 traZODone (DESYREL) 50 MG tablet   4. Chronic congestive heart failure, unspecified heart failure type (H)  I50.9 furosemide (LASIX) 20 MG tablet   5. ACS (acute coronary syndrome) (H)  I24.9 atorvastatin (LIPITOR) 20 MG tablet   6. Hyperlipidemia with target LDL less than 130  E78.5 atorvastatin (LIPITOR) 20 MG tablet     Lipid panel reflex to direct LDL Fasting     Basic metabolic panel   7. Seasonal allergic rhinitis due to other allergic trigger  J30.89 fluticasone (FLONASE) 50 MCG/ACT nasal spray     Trigger finger.  Recommend surgery after the pandemic.    Enlarged lymph node and bruising. Patient to see oncology.     Patient has been advised of split billing requirements and indicates understanding: Yes  COUNSELING:  Reviewed preventive health counseling, as reflected in patient instructions       Regular exercise       Healthy diet/nutrition    Estimated body mass index is 17.91 kg/m  as calculated from the following:    Height as of this encounter: 1.499 m (4' 11\").    Weight as of this encounter: 40.2 " kg (88 lb 11 oz).        She reports that she quit smoking about 24 years ago. Her smoking use included cigarettes. She has never used smokeless tobacco.      Appropriate preventive services were discussed with this patient, including applicable screening as appropriate for cardiovascular disease, diabetes, osteopenia/osteoporosis, and glaucoma.  As appropriate for age/gender, discussed screening for colorectal cancer, prostate cancer, breast cancer, and cervical cancer. Checklist reviewing preventive services available has been given to the patient.    Reviewed patients plan of care and provided an AVS. The Basic Care Plan (routine screening as documented in Health Maintenance) for Marie meets the Care Plan requirement. This Care Plan has been established and reviewed with the Patient.    Counseling Resources:  ATP IV Guidelines  Pooled Cohorts Equation Calculator  Breast Cancer Risk Calculator  Breast Cancer: Medication to Reduce Risk  FRAX Risk Assessment  ICSI Preventive Guidelines  Dietary Guidelines for Americans, 2010  USDA's MyPlate  ASA Prophylaxis  Lung CA Screening    Piper Kiser MD  [facility]    Identified Health Risks:

## 2021-01-07 LAB
ANION GAP SERPL CALCULATED.3IONS-SCNC: 3 MMOL/L (ref 3–14)
BUN SERPL-MCNC: 14 MG/DL (ref 7–30)
CALCIUM SERPL-MCNC: 9.1 MG/DL (ref 8.5–10.1)
CHLORIDE SERPL-SCNC: 102 MMOL/L (ref 94–109)
CHOLEST SERPL-MCNC: 155 MG/DL
CO2 SERPL-SCNC: 34 MMOL/L (ref 20–32)
CREAT SERPL-MCNC: 0.87 MG/DL (ref 0.52–1.04)
GFR SERPL CREATININE-BSD FRML MDRD: 59 ML/MIN/{1.73_M2}
GLUCOSE SERPL-MCNC: 90 MG/DL (ref 70–99)
HDLC SERPL-MCNC: 46 MG/DL
LDLC SERPL CALC-MCNC: 89 MG/DL
NONHDLC SERPL-MCNC: 109 MG/DL
POTASSIUM SERPL-SCNC: 4 MMOL/L (ref 3.4–5.3)
SODIUM SERPL-SCNC: 139 MMOL/L (ref 133–144)
TRIGL SERPL-MCNC: 102 MG/DL

## 2021-01-07 ASSESSMENT — ANXIETY QUESTIONNAIRES: GAD7 TOTAL SCORE: 2

## 2021-01-21 ENCOUNTER — PATIENT OUTREACH (OUTPATIENT)
Dept: NURSING | Facility: CLINIC | Age: 86
End: 2021-01-21
Payer: MEDICARE

## 2021-01-21 NOTE — PROGRESS NOTES
Clinic Care Coordination Contact  Community Health Worker Follow Up  Spoke to Marie    Goals:   Goals Addressed as of 1/21/2021 at 12:07 PM                 Today    12/16/20      3. Psychosocial (pt-stated)   40%  30%    Added 10/23/19 by Babs Garcia RN     Goal Statement: I will be able to paint my birds again, by the end of 2020.  Date goal set: 10/23/19   Measure of Success: Patient will have decreased tremors that prohibit her from painting.  Barriers: Tremors/dizziness  Strengths: positive attitude.  Good support system.  Social. Caty.  Date to Achieve By: 12/31/2020  Patient expressed understanding of goal: yes    Action steps to achieve this goal  1. I will speak to MTM about SE of medications   2. I will f/u with provider as recommended.  3. I will stay active and social.    01/21, CHW:    Marie has been working on crocheting since her tremors have not gone down.    Patient has been crocheting a lot of projects.        4. Medical (pt-stated)   70%  60%    Added 9/10/20 by Rogelio Gaming, [facility]     Goal Statement: I want my skin tears to heal within 6 months.  Date Goal set: 9.10.2020  Barriers: Medical conditions impacting fragility, many opportunities for injury.  Strengths: Strong support system, recognition of need, coordination with PCP office to manage cares and needs.  Date to Achieve By: 3.10.2021  Patient expressed understanding of goal: Pt reports understanding and denies any additional questions or concerns at this times. SW CC engaged in AIDET communication during encounter.    Action steps to achieve this goal:  1. I will manage wound care until PCP appointment.  2. I will follow-up with primary care regarding plan of care.  3. I will follow wound care recommendations until healed.      01/21, CHW:    From last outreach, Marie was concerned about a bump, that she believed may have been a blood clot.    PCP reached out to patient and got Marie in for an ultrasound.    However, the bump  was just a conglomerate of blood, which she stated Dr Kiser pushed it down and it released all the blood to flow throughout her legs.        Intervention and Education during outreach:   CHW inquired if there was any addition support or needs at this time.  Marie states that Dr Kiser had refilled her prescriptions, and she was questioning if she really needed to take all these medications.  CHW reviewed medications with her and the diagnosis along with them.  CHW offered follow-up with lead CC or RN to review the medications in more detail.  However, patient stated that she'll plan to contact Dr Kiser if she has anymore questions about them.  CHW encouraged patient to contact CC if anything else was needed.    CHW Plan: CHW will continue monthly outreaches to monitor progression of goals.    KOREY Lawrence  Clinic Care Coordination  [facility] : [place], [place], and [place]  Phone: [phone]    ______________________  Next Outreach:  02/19/21  Planned Outreach Frequency: Monthly  Preferred Phone Number: [phone]    Last Assessment Date: 05/16/19  Care Plan Completion Date: 12/07/20  ______________________

## 2021-01-23 NOTE — PROGRESS NOTES
[facility]    Hospitalist Progress Note    Date of Service (when I saw the patient): 02/01/2018    Assessment & Plan   85-year-old white female with a history of chronic lymphocytic leukemia, stress induced cardiomyopathy, bladder cancer, COPD on oxygen p.r.n. she resides at [facility] and had heart contact with patients who have had influenza infection. She has been on IVIG for her CLL. Patient was admitted for shortness of breath and cough as well as fever of 101. She was positive for influenza A.  She was started on DuoNeb's, Tamiflu, oral prednisone. She was admitted for further management.    1.  Acute hypoxic respiratory failure likely secondary to influenza A and COPD exacerbation:   --Continue oral steroids, DuoNebs, Tamiflu. She does have cough with greenish sputum but a little better today.    --Will continue oral doxycycline, day#3.  Sputum culture negative.   --Continue Tessalon for cough.    2.  History of chronic lymphocytic leukemia: WBC chronically elevated. WBC at 39.6 today.   Monitor.     3.  Chronic kidney disease, stage 3:  Monitor.     4.  Hypothyroidism:  Continue with Synthroid.     5.  Coronary artery disease with prior history of stress induced cardiomyopathy: Continue PTA medications      CODE STATUS:  FULL CODE.        DVT Prophylaxis: Pneumatic Compression Devices    Code Status: Full Code    Disposition: Expected discharge in 1-2 days if she continues to improve    Janine Cloud MD    Interval History   Patient stated that she still having intermittent cough with greenish sputum.  She denies fever.  Has some wheezes.      -Data reviewed today: I reviewed all new labs and imaging results over the last 24 hours. I personally reviewed    Physical Exam   Temp: 98.2  F (36.8  C) Temp src: Oral BP: 129/58   Heart Rate: 71 Resp: 18 SpO2: 92 % O2 Device: Nasal cannula with humidification Oxygen Delivery: 2 LPM  Vitals:     Vital Signs with Ranges  Temp:  [97.9  F (36.6  C)-98.2  Onset: This week  Location / description: Cavity, jaw swelling.  Adrienne passed away this summer.  Made appt with new office for Friday.  She missed her appointment.  Dentist provided her with urgent care office, do not accept her insurance.   Precipitating Factors: known cavity  Pain Scale (1-10), 10 highest: States pain so severe \"curled up in a ball\", doesn't feel like she can leave house.  Associated Symptoms: n/a  What improves/worsens symptoms: n/a  Symptom specific medications: Tylenol, aleve (not helping), pain woke her from sleep.  LMP : Patient's last menstrual period was 10/16/2020 (approximate).  Are you pregnant or breast feeding: n/a  Recent visits (last 3-4 weeks) for same reason or recent surgery:  No  Did the patient have a positive coronavirus screening?:Not asked.  Date of Covid-19 exposure:      PLAN:  See Provider/Go to Urgent Care within next 4 hours    Patient/Caller agrees to follow recommendations.    Reason for Disposition  • [1] SEVERE pain (e.g., excruciating, unable to do any normal activities) AND [2] not improved 2 hours after pain medicine    Protocols used: TOOTHACHE-A-AH        F (36.8  C)] 98.2  F (36.8  C)  Heart Rate:  [71-88] 71  Resp:  [16-20] 18  BP: (102-129)/(54-65) 129/58  SpO2:  [89 %-95 %] 92 %  I/O last 3 completed shifts:  In: 590 [P.O.:590]  Out: 1200 [Urine:1200]    GEN:  Alert, oriented x 3, appears comfortable, NAD.  HEENT:  Normocephalic/atraumatic, no scleral icterus, no nasal discharge, mouth moist.  CV:  Regular rate and rhythm, no murmur or JVD.  S1 + S2 noted, no S3 or S4.  LUNGS:  Clear to auscultation bilaterally without rales/rhonchi/wheezing/retractions.  Symmetric chest rise on inhalation noted.  ABD:  Active bowel sounds, soft, non-tender/non-distended.  No rebound/guarding/rigidity.  EXT:  No edema or cyanosis.  Hands/feet warm to touch with good signs of peripheral perfusion.  No joint synovitis noted.  SKIN:  Dry to touch, no exanthems noted in the visualized areas.  NEURO:  Symmetric muscle strength, sensation to touch grossly intact.  No new focal deficits appreciated.    Medications       oseltamivir  30 mg Oral BID     sodium chloride (PF)  3 mL Intracatheter Q8H     doxycycline  100 mg Oral Q12H ANA     arformoterol  15 mcg Nebulization Q12H     levothyroxine  75 mcg Oral Daily     atorvastatin  20 mg Oral Daily     predniSONE  40 mg Oral Daily     ipratropium - albuterol 0.5 mg/2.5 mg/3 mL  3 mL Nebulization 4x daily     fluticasone-vilanterol  1 puff Inhalation Daily     metoprolol succinate  25 mg Oral Daily       Data     Recent Labs  Lab 02/01/18  0753 01/30/18  0743 01/29/18  1442   WBC 39.6* 44.1* 45.4*   HGB 11.0* 12.2 13.6   MCV 92 92 92   PLT 95* 90* 95*    133 133   POTASSIUM 4.5 4.7 4.1   CHLORIDE 100 98 96   CO2 32 31 30   BUN 15 15 14   CR 0.78 0.89 1.06*   ANIONGAP 2* 4 7   CARLOS 8.7 8.8 8.9   GLC 94 141* 122*       No results found for this or any previous visit (from the past 24 hour(s)).

## 2021-01-28 ENCOUNTER — TELEPHONE (OUTPATIENT)
Dept: INTERNAL MEDICINE | Facility: CLINIC | Age: 86
End: 2021-01-28

## 2021-01-28 NOTE — TELEPHONE ENCOUNTER
FYI~ January 28, 2020 I spoke with Marie, she is in need of financial assistance for medication.     We reviewed the Pharmacy Assistance Fund $500, the Prescription Assistance Program for manfacturer brand name assistance programs, gross income, Rx insurance and Rx list.     She is over income for the Pharmacy Assistance Fund.  I will be completing  assistance re-enrollment applications for Dulera & Proventil HFA.    Thank you,    Vandana Gonzalez  Prescription Assistance

## 2021-02-03 ENCOUNTER — PATIENT OUTREACH (OUTPATIENT)
Dept: CARE COORDINATION | Facility: CLINIC | Age: 86
End: 2021-02-03

## 2021-02-03 ASSESSMENT — ACTIVITIES OF DAILY LIVING (ADL): DEPENDENT_IADLS:: TRANSPORTATION;SHOPPING;CLEANING

## 2021-02-03 NOTE — PROGRESS NOTES
Clinic Care Coordination Contact    Situation: Patient chart reviewed by care coordinator.    Background: Patient is enrolled in Clinic Care Coordination with the goals of resuming her hobbies and healing her skin tear.     Assessment: Per CHW, Patient continues to progress towards goal completion. Pt is working with PCP to address any new wound needs/concerns.     Plan/Recommendations: CHW will continue monthly outreaches to address goal progression. Pt will continue to zainab and complete wound cares.  CC will remain available for CC needs and will schedule a clinical review in 6 weeks.     Rogelio Casey [facility]  Clinic Care Coordinator  Ph. [phone]  [email or web address]

## 2021-02-08 ENCOUNTER — TRANSFERRED RECORDS (OUTPATIENT)
Dept: HEALTH INFORMATION MANAGEMENT | Facility: CLINIC | Age: 86
End: 2021-02-08

## 2021-02-09 ENCOUNTER — TELEPHONE (OUTPATIENT)
Dept: INTERNAL MEDICINE | Facility: CLINIC | Age: 86
End: 2021-02-09

## 2021-03-03 ENCOUNTER — PATIENT OUTREACH (OUTPATIENT)
Dept: CARE COORDINATION | Facility: CLINIC | Age: 86
End: 2021-03-03

## 2021-03-03 NOTE — PROGRESS NOTES
Clinic Care Coordination Contact  [facility]/Voicemail       Clinical Data: Care Coordinator Outreach  Outreach attempted x 1.  Left message on patient's voicemail with call back information and requested return call.    Plan:  Care Coordinator will try to reach patient again in 10 business days.    KOREY Lawrence  Clinic Care Coordination  [facility] : [place], [place], and [place]  Phone: [phone]    ______________________  Next Outreach:  03/17/21  Planned Outreach Frequency: Monthly  Preferred Phone Number: [phone]    Last Assessment Date: 05/16/19  Care Plan Completion Date: 12/07/20  ______________________

## 2021-03-12 ENCOUNTER — TELEPHONE (OUTPATIENT)
Dept: INTERNAL MEDICINE | Facility: CLINIC | Age: 86
End: 2021-03-12

## 2021-03-12 NOTE — TELEPHONE ENCOUNTER
Reason for call:  Other   Patient called regarding (reason for call): call back    Additional comments: per patient , she wanted to see if she can be worked into the schedule sooner. She is having issues with breathing .    Phone number to reach patient:  Home number on file [phone] (home)    Best Time:  Anytime     Can we leave a detailed message on this number?  NO    Travel screening: Not Applicable

## 2021-03-12 NOTE — TELEPHONE ENCOUNTER
"Next 5 appointments (look out 90 days)    Apr 28, 2021  1:20 PM  Office Visit with Piper Kiser MD  [facility] ([facility] ) [address]  [facility] [address] [phone]        Call to patient. States she wants to talk about her inhalers. States she has enough to get by until appointment. States she went to a pulmonologist and received a \"stronger\" inhaler. States that provider is not in [place] so insurance does not cover these prescriptions. Patient would like Dr. Kiser to take them over.   "

## 2021-03-22 NOTE — PROGRESS NOTES
Clinic Care Coordination Contact  [facility]/Voicemail       Clinical Data: Care Coordinator Outreach  Outreach attempted x 2.  Left message on patient's voicemail with call back information and requested return call.    Plan:  Care Coordinator will try to reach patient again in 1 month.    KOREY Lawrence  Clinic Care Coordination  [facility] : [place], [place], and [place]  Phone: [phone]    ______________________  Next Outreach:  04/19/21  Planned Outreach Frequency: Monthly  Preferred Phone Number: [phone]    Last Assessment Date: 05/16/19  Care Plan Completion Date: 12/07/20  ______________________

## 2021-03-24 ENCOUNTER — OFFICE VISIT (OUTPATIENT)
Dept: INTERNAL MEDICINE | Facility: CLINIC | Age: 86
End: 2021-03-24
Payer: MEDICARE

## 2021-03-24 ENCOUNTER — PATIENT OUTREACH (OUTPATIENT)
Dept: NURSING | Facility: CLINIC | Age: 86
End: 2021-03-24
Payer: MEDICARE

## 2021-03-24 ENCOUNTER — ANCILLARY PROCEDURE (OUTPATIENT)
Dept: GENERAL RADIOLOGY | Facility: CLINIC | Age: 86
End: 2021-03-24
Attending: INTERNAL MEDICINE
Payer: MEDICARE

## 2021-03-24 ENCOUNTER — TELEPHONE (OUTPATIENT)
Dept: INTERNAL MEDICINE | Facility: CLINIC | Age: 86
End: 2021-03-24

## 2021-03-24 VITALS
HEIGHT: 59 IN | DIASTOLIC BLOOD PRESSURE: 68 MMHG | TEMPERATURE: 98.3 F | WEIGHT: 89.1 LBS | RESPIRATION RATE: 18 BRPM | SYSTOLIC BLOOD PRESSURE: 126 MMHG | BODY MASS INDEX: 17.96 KG/M2 | OXYGEN SATURATION: 90 % | HEART RATE: 89 BPM

## 2021-03-24 DIAGNOSIS — I50.32 CHRONIC DIASTOLIC (CONGESTIVE) HEART FAILURE (H): Primary | ICD-10-CM

## 2021-03-24 DIAGNOSIS — I24.9 ACS (ACUTE CORONARY SYNDROME) (H): ICD-10-CM

## 2021-03-24 DIAGNOSIS — M81.0 SENILE OSTEOPOROSIS: ICD-10-CM

## 2021-03-24 DIAGNOSIS — F41.1 GAD (GENERALIZED ANXIETY DISORDER): ICD-10-CM

## 2021-03-24 DIAGNOSIS — M79.672 LEFT FOOT PAIN: ICD-10-CM

## 2021-03-24 DIAGNOSIS — E55.9 VITAMIN D DEFICIENCY: ICD-10-CM

## 2021-03-24 DIAGNOSIS — E03.8 OTHER SPECIFIED HYPOTHYROIDISM: ICD-10-CM

## 2021-03-24 DIAGNOSIS — F51.01 PRIMARY INSOMNIA: ICD-10-CM

## 2021-03-24 DIAGNOSIS — J44.1 COPD WITH ACUTE EXACERBATION (H): ICD-10-CM

## 2021-03-24 PROBLEM — Z91.81 HISTORY OF FALLING: Status: ACTIVE | Noted: 2019-07-24

## 2021-03-24 PROBLEM — M62.81 MUSCLE WEAKNESS (GENERALIZED): Status: ACTIVE | Noted: 2019-07-24

## 2021-03-24 PROBLEM — R26.89 OTHER ABNORMALITIES OF GAIT AND MOBILITY: Status: ACTIVE | Noted: 2019-07-24

## 2021-03-24 PROBLEM — T14.8XXD OTHER INJURY OF UNSPECIFIED BODY REGION, SUBSEQUENT ENCOUNTER: Status: ACTIVE | Noted: 2019-07-24

## 2021-03-24 PROBLEM — R48.8 OTHER SYMBOLIC DYSFUNCTIONS: Status: ACTIVE | Noted: 2019-09-06

## 2021-03-24 PROBLEM — S00.03XD CONTUSION OF SCALP, SUBSEQUENT ENCOUNTER: Status: ACTIVE | Noted: 2019-07-24

## 2021-03-24 LAB
DEPRECATED CALCIDIOL+CALCIFEROL SERPL-MC: 44 UG/L (ref 20–75)
TSH SERPL DL<=0.005 MIU/L-ACNC: 0.81 MU/L (ref 0.4–4)

## 2021-03-24 PROCEDURE — 84443 ASSAY THYROID STIM HORMONE: CPT | Performed by: INTERNAL MEDICINE

## 2021-03-24 PROCEDURE — 36415 COLL VENOUS BLD VENIPUNCTURE: CPT | Performed by: INTERNAL MEDICINE

## 2021-03-24 PROCEDURE — 73630 X-RAY EXAM OF FOOT: CPT | Mod: LT | Performed by: RADIOLOGY

## 2021-03-24 PROCEDURE — 99214 OFFICE O/P EST MOD 30 MIN: CPT | Performed by: INTERNAL MEDICINE

## 2021-03-24 PROCEDURE — 82306 VITAMIN D 25 HYDROXY: CPT | Performed by: INTERNAL MEDICINE

## 2021-03-24 RX ORDER — METOPROLOL SUCCINATE 25 MG/1
25 TABLET, EXTENDED RELEASE ORAL DAILY
Qty: 90 TABLET | Refills: 3 | Status: SHIPPED | OUTPATIENT
Start: 2021-03-24 | End: 2022-04-28

## 2021-03-24 RX ORDER — LEVOTHYROXINE SODIUM 75 UG/1
75 TABLET ORAL DAILY
Qty: 90 TABLET | Refills: 3 | Status: SHIPPED | OUTPATIENT
Start: 2021-03-24 | End: 2022-04-11

## 2021-03-24 RX ORDER — TRAZODONE HYDROCHLORIDE 50 MG/1
TABLET, FILM COATED ORAL
Qty: 90 TABLET | Refills: 4 | Status: SHIPPED | OUTPATIENT
Start: 2021-03-24 | End: 2021-03-26

## 2021-03-24 ASSESSMENT — MIFFLIN-ST. JEOR: SCORE: 739.78

## 2021-03-24 NOTE — PROGRESS NOTES
Assessment & Plan     Other specified hypothyroidism  assess  - levothyroxine (SYNTHROID/LEVOTHROID) 75 MCG tablet; Take 1 tablet (75 mcg) by mouth daily  - TSH with free T4 reflex    ACS (acute coronary syndrome) (H)  stable  - metoprolol succinate ER (TOPROL-XL) 25 MG 24 hr tablet; Take 1 tablet (25 mg) by mouth daily    LESLY (generalized anxiety disorder)  At goal  - sertraline (ZOLOFT) 50 MG tablet; Take 1 tablet (50 mg) by mouth daily    Primary insomnia    - traZODone (DESYREL) 50 MG tablet; Take 2 tablets by mouth At Bedtime    Chronic diastolic (congestive) heart failure (H)  Continue on regimen    COPD with acute exacerbation (H)  - patient to obtain inhaler prescribed by pulmonary    Vitamin D deficiency  assess  - Vitamin D Deficiency     Left foot pain  Comment: assess for fracture  Plan: XR Foot Left G/E 3 Views, [facility]          Referral            Senile osteoporosis  Comment:   Plan: ENDOCRINOLOGY ADULT REFERRAL            17 minutes spent on the date of the encounter doing chart review, patient visit and documentation          Return in about 6 months (around 9/24/2021) for Routine Visit.    Piper Kiser MD  [facility] MARK Salazar is a 88 year old who presents for the following health issues     Patient is being seen for an medication and breathing.  HPI     Hyperlipidemia Follow-Up      Are you regularly taking any medication or supplement to lower your cholesterol?   Yes- atorvastatin    Are you having muscle aches or other side effects that you think could be caused by your cholesterol lowering medication?  No    Hypertension Follow-up      Do you check your blood pressure regularly outside of the clinic? No     Are you following a low salt diet? Yes    Are your blood pressures ever more than 140 on the top number (systolic) OR more   than 90 on the bottom number (diastolic), for example 140/90? Yes      How many servings of fruits  "and vegetables do you eat daily?  0-1    On average, how many sweetened beverages do you drink each day (Examples: soda, juice, sweet tea, etc.  Do NOT count diet or artificially sweetened beverages)?   0 occasional can of Dr. Pepper    How many days per week do you exercise enough to make your heart beat faster? 3 or less    How many minutes a day do you exercise enough to make your heart beat faster? 9 or less    How many days per week do you miss taking your medication? 0    LESLY.   LESLY-7 SCORE 12/19/2018 4/16/2020 1/6/2021   Total Score 12 0 2     Bladder Cancer.  She is followed by urology, Dr. Nuñez.     CLL.  She followed by Dr. Ivory, oncology.    Cardiomyopathy.  No new shortness of breath.  No weight gain.    COPD.  Patient has a cough in the morning.  The patient uses inhalers.   She has seen Dr. Courtney, pulmonary.  She has had issues with her inhalers being covered.   She uses flonase for postnasal drip and allergies.      Hypothyroidism. Patient is on levothyroxine.     Left foot deformity. One week ago noticed that her left foot had deviated inward. No trauma to her foot that she recalls.     Review of Systems   CONSTITUTIONAL: NEGATIVE for fever, chills, change in weight  RESP: NEGATIVE for significant cough or SOB  CV: NEGATIVE for chest pain, palpitations or peripheral edema  MSK: left foot deformity      Objective    /68   Pulse 89   Temp 98.3  F (36.8  C) (Oral)   Resp 18   Ht 1.499 m (4' 11\")   Wt 40.4 kg (89 lb 1.6 oz)   LMP  (LMP Unknown)   SpO2 90%   BMI 18.00 kg/m    Body mass index is 18 kg/m .  Physical Exam   GENERAL: healthy, alert and no distress  RESP: lungs clear to auscultation - no rales, rhonchi or wheezes  CV: regular rate and rhythm, normal S1 S2, no S3 or S4, no murmur, click or rub  MSK: left foot with deviation of all metatarsal      Piper Kiser MD          "

## 2021-03-24 NOTE — TELEPHONE ENCOUNTER
Reason for Call:  Other appointment    Detailed comments: the patient was given a referral for endocrinology and cant get in until July but she has no video capability. She said that she would try to set one but but is still unable to get in until July. She would like to know is she able to be fit in sooner to get the help she needs. Please call her to discuss.  did check other locations but they are booing a ways out also      Phone Number Patient can be reached at: Home number on file [phone] (home)    Best Time: any     Can we leave a detailed message on this number? YES    Call taken on 3/24/2021 at 3:15 PM by Catrina Rivera

## 2021-03-24 NOTE — PROGRESS NOTES
CHW received voicemail on 03/22/21 @10:40AM, patient returning call.    Clinic Care Coordination Contact  Community Health Worker Follow Up  Spoke to Marie    Goals:   Goals Addressed as of 3/24/2021 at 10:48 AM                 Today    1/21/21      3. 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  Date goal set: 10/23/19   Measure of Success: Patient will have decreased tremors that prohibit her from painting.  Barriers: Tremors/dizziness  Strengths: positive attitude.  Good support system.  Social. Caty.  Date to Achieve By: 12/31/2020  Patient expressed understanding of goal: yes    Action steps to achieve this goal  1. I will speak to MTM about SE of medications   2. I will f/u with provider as recommended.  3. I will stay active and social.    03/24, CHW:    Marie reports that she has started painting again.    Patient has been taking paint classes, where they have the paint by numbers painting.    However, since patient isn't able to see the numbers very well, they said she can paint however she liked.    Patient reports that she has been painting a lot of portraits.    Marie felt satisfied that we can complete this goal.  Goal has been completed        4. Medical (pt-stated)   80%  70%    Added 9/10/20 by Rogelio Gaming, [facility]     Goal Statement: I want my skin tears to heal within 6 months.  Date Goal set: 9.10.2020  Barriers: Medical conditions impacting fragility, many opportunities for injury.  Strengths: Strong support system, recognition of need, coordination with PCP office to manage cares and needs.  Date to Achieve By: 3.10.2021  Patient expressed understanding of goal: Pt reports understanding and denies any additional questions or concerns at this times. SW CC engaged in AIDET communication during encounter.    Action steps to achieve this goal:  1. I will manage wound care until PCP  appointment.  2. I will follow-up with primary care regarding plan of care.  3. I will follow wound care recommendations until healed.      03/24, CHW:    Marie states that she keeps getting bruises and lumps on her legs.    She currently has 2 lumps on her left shin, however, patient states that she hasn't been hitting her leg or anything.    Marie has an appointment with Dr Kiser today, which she plans to address this.    She also has an appointment with oncology on 04/05/21, and will plan to speak to Dr Ivory at that time.        Intervention and Education during outreach:   CHW inquired if there was any other support or resources needed at this time.  However, patient had no other needs or concerns.  Patient understands that she can contact CC if there are any changes.    CHW Plan: CHW will continue monthly outreaches to monitor progression of goals.    KOREY Lawrence  Clinic Care Coordination  [facility] : [place], [place], and [place]  Phone: [phone]    ______________________  Next Outreach:  04/20/21  Planned Outreach Frequency: Monthly  Preferred Phone Number: [phone]    Last Assessment Date: 05/16/19  Care Plan Completion Date: 12/07/20  ______________________

## 2021-03-25 ENCOUNTER — PATIENT OUTREACH (OUTPATIENT)
Dept: CARE COORDINATION | Facility: CLINIC | Age: 86
End: 2021-03-25

## 2021-03-25 ASSESSMENT — ACTIVITIES OF DAILY LIVING (ADL): DEPENDENT_IADLS:: TRANSPORTATION;SHOPPING;CLEANING

## 2021-03-25 NOTE — TELEPHONE ENCOUNTER
Telephone encounter routed to Endocrin.    Please see message below.    Could patient be worked in earlier than July 2021 and could patient be seen in clinic?    Please advise, thanks.

## 2021-03-25 NOTE — TELEPHONE ENCOUNTER
Offer next available new patient slot on a Thursday afternoon or    ENDOCRINOLOGY PROVIDERS THROUGH Stephan  MOST APPOINTMENTS ARE VIRTUAL/TELEPHONE AT THIS TIME  Dr. Jeff ADAM [facility]   [address] [place] [address]  Appointment ph# [phone]     [facility] Office  [address] [place] [address]  Appointment ph# [phone]     Dr.Laedtke ADAM [facility]  [address] [address]  [address]  [place] [address]  Appointment ph# [phone]        [facility]  [address] [address]  Appointment ph# [phone]        Dr. Phillip ADAM [address] [address]  Appointment ph# [phone]     [place] Office  [address] [place] [address]  Appointment ph# [phone]        Zulay Lee NP  [facility] [address] [address]  Appointment ph# [phone]

## 2021-03-25 NOTE — LETTER
[facility] CARE COORDINATION  [address]  [facility] [address]  March 25, 2021        Marie Kline  [address]  [facility] [address]          Dear Skyler Salazar is an updated Complex Care Plan for your continued enrollment in Care Coordination. Please let us know if you have additional questions, concerns, or goals that we can assist with.    Sincerely,          YESSI Vela  Clinic Care Coordinator  Ph. [phone]  [email or web address]  Complex Care Plan  About Me:    Patient Name:  Marie Kline    YOB: 1932  Age:         88 year old   [place] MRN:    [id number] Telephone Information:  Home Phone [phone]   Mobile [phone]       Address:  [address] [place] [address] [address]  [facility] [address] Email address:  [email or web address]      Emergency Contact(s)    Name Relationship Lgl Grd Work Phone Home Phone Mobile Phone   1. CHRISTIANMARICARMEN RACHEL* Daughter No  [phone]   2. EMILIA BUCHANAN Grandchild No  [phone]   3. MITTELSTEADT, * Relative No  [phone]           Primary language:  English     needed? No   [place] [employer]:  [phone] op. 1  Other communication barriers: None  Preferred Method of Communication:  Mail  Current living arrangement: I live alone, I live in assisted living  Mobility Status/ Medical Equipment: Independent w/Device    Health Maintenance  Health Maintenance Reviewed: Not assessed  Health Maintenance Due   Topic Date Due     HF ACTION PLAN  Never done     COVID-19 Vaccine (1) Never done     ZOSTER IMMUNIZATION (1 of 2) Never done     CBC  10/15/2020       My Access Plan  Medical Emergency 911   Primary Clinic Line [facility] - [phone]   24 Hour Appointment Line [phone] or  [phone]) (toll-free)   24 Hour Nurse Line [phone] (toll-free)   Preferred  Urgent Care     Preferred Hospital [facility]  [phone]   Preferred Pharmacy [id number] - [place], [place] - [address] [facility]     Behavioral Health Crisis Line The National Suicide Prevention Lifeline at 1-989.955.2513 or 911       My Care Team Members  Patient Care Team       Relationship Specialty Notifications Start End    Piper Kiser MD PCP - General Internal Medicine  11/11/14     Phone: [phone] Fax: [phone]         [address] [facility] [address]    Isabel Ivory MD MD Oncology  2/6/17     Phone: [phone] Fax: [phone]         [place] [facility]  [address] [place] [address]    Yuliana Cast, [facility] Pharmacist Pharmacist  4/16/19     Phone: [phone] Pager: [phone]         [address] [place] [address] [address] [facility] [address]    Richard Hanna Community Health Worker Primary Care - CC  7/30/19     [place]; Ph: [phone], Fax: [phone]    Rogelio Gaming, [facility] Lead Care Coordinator   6/16/20     Piper Kiser MD Assigned PCP   7/19/20     Phone: [phone] Fax: [phone]         [address] [facility] [address]    Marilee Arias [facility] Pharmacist Pharmacist  8/31/20     Phone: [phone] Fax: [phone]         [address] [facility] [address]    Kar Nuñez MD Assigned Surgical Provider   10/23/20     Phone: [phone] Fax: [phone] [address] [place] [address]            My Care Plans  Self Management and Treatment Plan  Goals and (Comments)  Goals        General    4. Medical (pt-stated)     Notes - Note edited  10/14/2020  1:58 PM by Kerry Sanderson LSW    Goal Statement: I want my skin tears to heal within 6 months.  Date Goal set: 9.10.2020  Barriers: Medical conditions impacting fragility, many opportunities for injury.  Strengths: Strong support system, recognition of need, coordination with PCP office to manage cares and  needs.  Date to Achieve By: 3.10.2021  Patient expressed understanding of goal: Pt reports understanding and denies any additional questions or concerns at this times. JOSIAS CC engaged in AIDET communication during encounter.    Action steps to achieve this goal:  1. I will manage wound care until PCP appointment.  2. I will follow-up with primary care regarding plan of care.  3. I will follow wound care recommendations until healed.               Action Plans on File:   COPD     Advance Care Plans/Directives Type:   Type Advanced Care Plans/Directives: POLST    My Medical and Care Information  Problem List   Patient Active Problem List   Diagnosis     Acute and chronic respiratory failure with hypercapnia (HCC)     Nonischemic cardiomyopathy (H)     Pneumonia     Acute myocardial infarction, initial episode of care (HCC)     CLL (chronic lymphocytic leukemia) (HCC)     CHF (congestive heart failure) (HCC)     Cardiomyopathy in other diseases classified elsewhere (HCC)     Hyperlipidemia with target LDL less than 130     Health Care Home     COPD exacerbation (H)     LESLY (generalized anxiety disorder)     Hypothyroidism     Back pain with radiation     DDD (degenerative disc disease), lumbar     Splenomegaly     Lumbar degenerative disc disease     Lumbar foraminal stenosis     Central spinal stenosis     Bladder cancer (H)     Sepsis (H)     Senile osteoporosis     CKD (chronic kidney disease) stage 3, GFR 30-59 ml/min     Purpura senilis (H)     Xerosis cutis     Malignant neoplasm of urinary bladder, unspecified site (H)     Hypoxia     Femoral neck fracture (H)     S/P total hip arthroplasty     NSTEMI (non-ST elevated myocardial infarction) (H)     Stress-induced cardiomyopathy     COPD with acute exacerbation (H)     Shoulder fracture     Mouth sores     Closed nondisplaced fracture of pelvis with routine healing, unspecified part of pelvis, subsequent encounter     Pelvic hematoma in female     Closed head  injury, subsequent encounter     Multiple skin tears     Pneumonia of left lung due to infectious organism, unspecified part of lung     Chest pain     Coronary artery disease involving native heart with angina pectoris, unspecified vessel or lesion type (H)     Acquired hypogammaglobulinemia (H)     Chronic diastolic (congestive) heart failure (H)     Insomnia     Contusion of scalp, subsequent encounter     Essential (primary) hypertension     Fatigue     History of falling     Hypokalemia     Hyponatremia     Muscle weakness (generalized)     Other abnormalities of gait and mobility     Other injury of unspecified body region, subsequent encounter     Other symbolic dysfunctions     Weakness of left leg      Current Medications and Allergies:  See printed Medication Report.  Current Outpatient Medications   Medication Instructions     albuterol (PROVENTIL HFA) 108 (90 Base) MCG/ACT inhaler 2 puffs, Inhalation, EVERY 6 HOURS PRN     albuterol (PROVENTIL) 2.5 mg, Nebulization, EVERY 6 HOURS PRN     aspirin 81 mg, Oral, DAILY     atorvastatin (LIPITOR) 20 mg, Oral, DAILY     ferrous sulfate (FEROSUL) 325 mg, Oral, DAILY WITH BREAKFAST     fluticasone (FLONASE) 50 MCG/ACT nasal spray 1 spray, Both Nostrils, DAILY     furosemide (LASIX) 20 mg, Oral, DAILY     Immune Globulin, Human, (OCTAGAM) 5 GM/100ML SOLN 300 mg/kg into the vein every 30 days<BR><BR>Hold this medication while in TCU-resume at discharge from TCU     levothyroxine (SYNTHROID/LEVOTHROID) 75 mcg, Oral, DAILY     metoprolol succinate ER (TOPROL-XL) 25 mg, Oral, DAILY     Multiple Vitamins-Minerals (MULTIVITAMIN ADULT) CHEW 2 chew tab, Oral, DAILY     order for DME Equipment being ordered: Oxygen 2LNC continuous with portability tank due to mobility in the home. <BR><BR>Length of need is 99 months.     order for DME Equipment being ordered: Oxygen concentrator, O2-85% resting room air & 02-82% walking room air, Flow rate 2L     sertraline (ZOLOFT) 50 mg,  Oral, DAILY     sodium chloride (OCEAN) 0.65 % nasal spray 1 spray, Both Nostrils, EVERY 1 HOUR PRN     traZODone (DESYREL) 50 MG tablet Take 2 tablets by mouth At Bedtime        Allergies   Allergen Reactions     Diagnostic X-Ray Materials Hives     CT DYE     Contrast Dye Hives     CT DYE     Prolia [Denosumab]      Osteonecrosis jaw       Wound Dressing Adhesive        Care Coordination Start Date: 5/16/2019   Frequency of Care Coordination: monthly   Form Last Updated: 03/25/2021

## 2021-03-25 NOTE — PROGRESS NOTES
Care Coordination Clinician Chart Review  Situation: Patient chart reviewed by care coordinator.       Background: Care Coordination initial assessment and enrollment to Care Coordination was 5.16.2019.   Patient centered goals were developed with participation from patient.  JOSIAS CH handed patient off to CHW for continued outreach every 30 days.        Assessment: Per chart review, patient outreach completed by CC CHW on 3.24.2021.  Patient is actively working to accomplish goals.  Patient's goals remain appropriate and relevant at this time.   Patient is due for updated Complex Care Plan.  Annual assessment will be due 9/2021.      Goals        Patient Stated      4. Medical (pt-stated)      Goal Statement: I want my skin tears to heal within 6 months.  Date Goal set: 9.10.2020  Barriers: Medical conditions impacting fragility, many opportunities for injury.  Strengths: Strong support system, recognition of need, coordination with PCP office to manage cares and needs.  Date to Achieve By: 3.10.2021  Patient expressed understanding of goal: Pt reports understanding and denies any additional questions or concerns at this times. JOSIAS CH engaged in AIDET communication during encounter.    Action steps to achieve this goal:  1. I will manage wound care until PCP appointment.  2. I will follow-up with primary care regarding plan of care.  3. I will follow wound care recommendations until healed.                  Plan/Recommendations: The patient will continue working with Care Coordination to achieve goals as above.  CHW will involve JOSIAS CH as needed or if patient is ready to move to maintenance.  JOSIAS CC will continue to monitor progress to goals and CHW outreaches every 6 weeks.   Care Plan updated and mailed to patient: Yes, YESSI Calero  Clinic Care Coordinator  Ph. [phone]  [email or web address]

## 2021-03-25 NOTE — TELEPHONE ENCOUNTER
Endo staff- can you please take a look into this?  Please offer in person visit- Thrusday afternoons.  Thank you.

## 2021-03-26 ENCOUNTER — HOSPITAL ENCOUNTER (INPATIENT)
Facility: CLINIC | Age: 86
LOS: 4 days | Discharge: HOME HEALTH | DRG: 190 | End: 2021-03-30
Attending: EMERGENCY MEDICINE | Admitting: HOSPITALIST
Payer: MEDICARE

## 2021-03-26 ENCOUNTER — APPOINTMENT (OUTPATIENT)
Dept: GENERAL RADIOLOGY | Facility: CLINIC | Age: 86
DRG: 190 | End: 2021-03-26
Attending: EMERGENCY MEDICINE
Payer: MEDICARE

## 2021-03-26 ENCOUNTER — NURSE TRIAGE (OUTPATIENT)
Dept: INTERNAL MEDICINE | Facility: CLINIC | Age: 86
End: 2021-03-26

## 2021-03-26 DIAGNOSIS — R91.8 PULMONARY NODULES: ICD-10-CM

## 2021-03-26 DIAGNOSIS — J96.22 ACUTE AND CHRONIC RESPIRATORY FAILURE WITH HYPERCAPNIA (H): Primary | ICD-10-CM

## 2021-03-26 DIAGNOSIS — J44.1 COPD EXACERBATION (H): ICD-10-CM

## 2021-03-26 DIAGNOSIS — C91.10 CLL (CHRONIC LYMPHOCYTIC LEUKEMIA) (H): ICD-10-CM

## 2021-03-26 DIAGNOSIS — R09.02 HYPOXIA: ICD-10-CM

## 2021-03-26 LAB
ANION GAP SERPL CALCULATED.3IONS-SCNC: 6 MMOL/L (ref 3–14)
ANISOCYTOSIS BLD QL SMEAR: SLIGHT
BASE EXCESS BLDV CALC-SCNC: 6 MMOL/L
BASOPHILS # BLD AUTO: 0 10E9/L (ref 0–0.2)
BASOPHILS NFR BLD AUTO: 0 %
BUN SERPL-MCNC: 15 MG/DL (ref 7–30)
CALCIUM SERPL-MCNC: 8.8 MG/DL (ref 8.5–10.1)
CHLORIDE SERPL-SCNC: 95 MMOL/L (ref 94–109)
CO2 SERPL-SCNC: 31 MMOL/L (ref 20–32)
CREAT SERPL-MCNC: 0.92 MG/DL (ref 0.52–1.04)
DIFFERENTIAL METHOD BLD: ABNORMAL
EOSINOPHIL # BLD AUTO: 0 10E9/L (ref 0–0.7)
EOSINOPHIL NFR BLD AUTO: 0 %
ERYTHROCYTE [DISTWIDTH] IN BLOOD BY AUTOMATED COUNT: 16.5 % (ref 10–15)
FLUAV RNA RESP QL NAA+PROBE: NEGATIVE
FLUBV RNA RESP QL NAA+PROBE: NEGATIVE
GFR SERPL CREATININE-BSD FRML MDRD: 55 ML/MIN/{1.73_M2}
GLUCOSE SERPL-MCNC: 140 MG/DL (ref 70–99)
HCO3 BLDV-SCNC: 32 MMOL/L (ref 21–28)
HCT VFR BLD AUTO: 35.5 % (ref 35–47)
HGB BLD-MCNC: 11.2 G/DL (ref 11.7–15.7)
LABORATORY COMMENT REPORT: NORMAL
LYMPHOCYTES # BLD AUTO: 134.8 10E9/L (ref 0.8–5.3)
LYMPHOCYTES NFR BLD AUTO: 93 %
MACROCYTES BLD QL SMEAR: PRESENT
MCH RBC QN AUTO: 30 PG (ref 26.5–33)
MCHC RBC AUTO-ENTMCNC: 31.5 G/DL (ref 31.5–36.5)
MCV RBC AUTO: 95 FL (ref 78–100)
MONOCYTES # BLD AUTO: 4.3 10E9/L (ref 0–1.3)
MONOCYTES NFR BLD AUTO: 3 %
NEUTROPHILS # BLD AUTO: 5.8 10E9/L (ref 1.6–8.3)
NEUTROPHILS NFR BLD AUTO: 4 %
NT-PROBNP SERPL-MCNC: 2395 PG/ML (ref 0–1800)
O2/TOTAL GAS SETTING VFR VENT: 1 %
OVALOCYTES BLD QL SMEAR: SLIGHT
OXYHGB MFR BLDV: 65 %
PCO2 BLDV: 54 MM HG (ref 40–50)
PH BLDV: 7.38 PH (ref 7.32–7.43)
PLATELET # BLD AUTO: 102 10E9/L (ref 150–450)
PLATELET # BLD EST: ABNORMAL 10*3/UL
PO2 BLDV: 37 MM HG (ref 25–47)
POTASSIUM SERPL-SCNC: 3.9 MMOL/L (ref 3.4–5.3)
RBC # BLD AUTO: 3.73 10E12/L (ref 3.8–5.2)
RSV RNA SPEC QL NAA+PROBE: NORMAL
SARS-COV-2 RNA RESP QL NAA+PROBE: NEGATIVE
SODIUM SERPL-SCNC: 132 MMOL/L (ref 133–144)
SPECIMEN SOURCE: NORMAL
TROPONIN I SERPL-MCNC: <0.015 UG/L (ref 0–0.04)
WBC # BLD AUTO: 144.9 10E9/L (ref 4–11)

## 2021-03-26 PROCEDURE — 96374 THER/PROPH/DIAG INJ IV PUSH: CPT

## 2021-03-26 PROCEDURE — 83880 ASSAY OF NATRIURETIC PEPTIDE: CPT | Performed by: EMERGENCY MEDICINE

## 2021-03-26 PROCEDURE — 82805 BLOOD GASES W/O2 SATURATION: CPT | Performed by: EMERGENCY MEDICINE

## 2021-03-26 PROCEDURE — 85025 COMPLETE CBC W/AUTO DIFF WBC: CPT | Performed by: EMERGENCY MEDICINE

## 2021-03-26 PROCEDURE — 93005 ELECTROCARDIOGRAM TRACING: CPT

## 2021-03-26 PROCEDURE — 80048 BASIC METABOLIC PNL TOTAL CA: CPT | Performed by: EMERGENCY MEDICINE

## 2021-03-26 PROCEDURE — 99223 1ST HOSP IP/OBS HIGH 75: CPT | Mod: AI | Performed by: HOSPITALIST

## 2021-03-26 PROCEDURE — 99285 EMERGENCY DEPT VISIT HI MDM: CPT | Mod: 25

## 2021-03-26 PROCEDURE — 120N000001 HC R&B MED SURG/OB

## 2021-03-26 PROCEDURE — 71045 X-RAY EXAM CHEST 1 VIEW: CPT

## 2021-03-26 PROCEDURE — 87636 SARSCOV2 & INF A&B AMP PRB: CPT | Performed by: EMERGENCY MEDICINE

## 2021-03-26 PROCEDURE — 250N000011 HC RX IP 250 OP 636: Performed by: EMERGENCY MEDICINE

## 2021-03-26 PROCEDURE — 250N000009 HC RX 250: Performed by: EMERGENCY MEDICINE

## 2021-03-26 PROCEDURE — 94640 AIRWAY INHALATION TREATMENT: CPT

## 2021-03-26 PROCEDURE — C9803 HOPD COVID-19 SPEC COLLECT: HCPCS

## 2021-03-26 PROCEDURE — 84484 ASSAY OF TROPONIN QUANT: CPT | Performed by: EMERGENCY MEDICINE

## 2021-03-26 RX ORDER — METHYLPREDNISOLONE SODIUM SUCCINATE 125 MG/2ML
125 INJECTION, POWDER, LYOPHILIZED, FOR SOLUTION INTRAMUSCULAR; INTRAVENOUS ONCE
Status: COMPLETED | OUTPATIENT
Start: 2021-03-26 | End: 2021-03-26

## 2021-03-26 RX ORDER — IPRATROPIUM BROMIDE AND ALBUTEROL SULFATE 2.5; .5 MG/3ML; MG/3ML
3 SOLUTION RESPIRATORY (INHALATION)
Status: COMPLETED | OUTPATIENT
Start: 2021-03-26 | End: 2021-03-26

## 2021-03-26 RX ORDER — TRAZODONE HYDROCHLORIDE 50 MG/1
50 TABLET, FILM COATED ORAL AT BEDTIME
COMMUNITY
End: 2022-05-18

## 2021-03-26 RX ADMIN — METHYLPREDNISOLONE SODIUM SUCCINATE 125 MG: 125 INJECTION, POWDER, FOR SOLUTION INTRAMUSCULAR; INTRAVENOUS at 22:34

## 2021-03-26 RX ADMIN — IPRATROPIUM BROMIDE AND ALBUTEROL SULFATE 3 ML: .5; 3 SOLUTION RESPIRATORY (INHALATION) at 22:16

## 2021-03-26 RX ADMIN — IPRATROPIUM BROMIDE AND ALBUTEROL SULFATE 3 ML: .5; 3 SOLUTION RESPIRATORY (INHALATION) at 22:03

## 2021-03-26 RX ADMIN — IPRATROPIUM BROMIDE AND ALBUTEROL SULFATE 3 ML: .5; 3 SOLUTION RESPIRATORY (INHALATION) at 20:58

## 2021-03-26 ASSESSMENT — ENCOUNTER SYMPTOMS
FATIGUE: 1
DIARRHEA: 1
BLOOD IN STOOL: 0
VOMITING: 0
DIFFICULTY URINATING: 0
HEMATURIA: 0
DYSURIA: 0
FEVER: 1
SHORTNESS OF BREATH: 1
FREQUENCY: 0
NECK PAIN: 0
BACK PAIN: 0
ABDOMINAL PAIN: 0

## 2021-03-26 NOTE — ED TRIAGE NOTES
Patient presents with complaints of cough, SOB, and fatigue. Patient saw seen by her PCP and tested for Covid which was negative. Patient is using her home oxygen which is not normal for her  Patient PCP told her to come to ER to rule out pneumonia. ABC intact without need for intervention at this time.

## 2021-03-26 NOTE — TELEPHONE ENCOUNTER
Left additional urgent voice message for patient to call back to the clinic as soon as she receives the voice message.

## 2021-03-26 NOTE — TELEPHONE ENCOUNTER
Patient calls, she had appointment with Dr. Kiser on 3/24 and started to feel sick shortly after her appointment. When she got home from her appointment, she was very tired and took a 3 hour nap. When she woke up she was feeling short of breath and put her oxygen on at 2 L. She states her shortness of breath is better on oxygen, and O2 saturations are 94-95% at rest, but will drop to 84% when she walks to the bathroom. Yesterday, she started to have headaches and then last night slept for 12 hours. This morning when she woke up her temp was 101, took ASA and rechecked, temp was 102.1. She is coughing, but this is not any worse than usual with her COPD. She has never had a headache with previous COPD flare-up and doesn't usually get fever. Denies other symptoms, but she has not needed her oxygen for a few years yet.     She has received both COVID-19 vaccines, only exposure has been through office visit at our office and with her son who brought her to the appointment-she states [facility] is keeping residents in isolation.     Reviewed home care advice with patient: take Tylenol for fever and pain, rest and drink fluids. Advised patient to go to the ER if breathing worsens, feels like she needs more oxygen, has severe headache or cannot bend chin to chest.     Contacted Dr. Kiser for second level triage. She states for patient to go to the ER for further evaluation.     Attempted to contact patient. Left voice message to call back. Please transfer patient to a nurse when she calls back.    Additional Information    Negative: Breathing stopped and hasn't returned    Negative: Choking on something    Negative: SEVERE difficulty breathing (e.g., struggling for each breath, speaks in single words, pulse > 120)    Negative: Bluish (or gray) lips or face    Negative: Difficult to awaken or acting confused (e.g., disoriented, slurred speech)    Negative: Passed out (i.e., fainted, collapsed and was not  "responding)    Negative: Wheezing started suddenly after medicine, an allergic food, or bee sting    Negative: Stridor    Negative: Slow, shallow and weak breathing    Negative: Sounds like a life-threatening emergency to the triager    Negative: Chest pain    Negative: Wheezing (high pitched whistling sound) and previous asthma attacks or use of asthma medicines    Negative: Difficulty breathing and only present when coughing    Negative: Difficulty breathing and only from stuffy or runny nose    Negative: MODERATE difficulty breathing (e.g., speaks in phrases, SOB even at rest, pulse 100-120) of new onset or worse than normal    Negative: Wheezing can be heard across the room    Negative: Drooling or spitting out saliva (because can't swallow)    Negative: Any history of prior \"blood clot\" in leg or lungs    Negative: Recent illness requiring prolonged bedrest (i.e., immobilization)    Negative: Hip or leg fracture in past 2 months (e.g., or had cast on leg or ankle)    Negative: Major surgery in the past month    Negative: Recent long-distance travel with prolonged time in car, bus, plane, or train (i.e., within past 2 weeks; 6 or  more hours duration)    Negative: Extra heart beats OR irregular heart beating   (i.e., \"palpitations\")    Negative: Fever > 103 F (39.4 C)    Fever > 101 F (38.3 C) and over 60 years of age    Answer Assessment - Initial Assessment Questions  1. RESPIRATORY STATUS: \"Describe your breathing?\" (e.g., wheezing, shortness of breath, unable to speak, severe coughing)       Short of breath   2. ONSET: \"When did this breathing problem begin?\"       Wednesday afternoon  3. PATTERN \"Does the difficult breathing come and go, or has it been constant since it started?\"       Better with wearing oxygen  4. SEVERITY: \"How bad is your breathing?\" (e.g., mild, moderate, severe)     - MILD: No SOB at rest, mild SOB with walking, speaks normally in sentences, can lay down, no retractions, pulse < 100. " "    - MODERATE: SOB at rest, SOB with minimal exertion and prefers to sit, cannot lie down flat, speaks in phrases, mild retractions, audible wheezing, pulse 100-120.     - SEVERE: Very SOB at rest, speaks in single words, struggling to breathe, sitting hunched forward, retractions, pulse > 120       Mild with oxygen  5. RECURRENT SYMPTOM: \"Have you had difficulty breathing before?\" If so, ask: \"When was the last time?\" and \"What happened that time?\"       Yes, history of COPD, but has not needed oxygen for a few years  6. CARDIAC HISTORY: \"Do you have any history of heart disease?\" (e.g., heart attack, angina, bypass surgery, angioplasty)       CHF  7. LUNG HISTORY: \"Do you have any history of lung disease?\"  (e.g., pulmonary embolus, asthma, emphysema)      COPD  8. CAUSE: \"What do you think is causing the breathing problem?\"       Unsure, no sick contacts  9. OTHER SYMPTOMS: \"Do you have any other symptoms? (e.g., dizziness, runny nose, cough, chest pain, fever)      Headache, fatigue  10. PREGNANCY: \"Is there any chance you are pregnant?\" \"When was your last menstrual period?\"        n/a  11. TRAVEL: \"Have you traveled out of the country in the last month?\" (e.g., travel history, exposures)        no    Protocols used: BREATHING DIFFICULTY-A-OH    "

## 2021-03-26 NOTE — TELEPHONE ENCOUNTER
Patient calls, she had appointment with Dr. Kiser on 3/24 and started to feel sick shortly after her appointment. When she got home from her appointment, she was very tired and took a 3 hour nap. When she woke up she was feeling short of breath and put her oxygen on at 2 L. She states her shortness of breath is better on oxygen, and O2 saturations are 94-95% at rest, but will drop to 84% when she walks to the bathroom. Yesterday, she started to have headaches and then last night slept for 12 hours. This morning when she woke up her temp was 101, took ASA and rechecked, temp was 102.1. She is coughing, but this is not any worse than usual with her COPD. She has never had a headache with previous COPD flare-up and doesn't usually get fever. Denies other symptoms, but she has not needed her oxygen for a few years yet.     She has received both COVID-19 vaccines, only exposure has been through office visit at our office and with her son who brought her to the appointment-she states [facility] is keeping residents in isolation.     Advised patient to monitor symptoms at home, take Tylenol for fever and pain, rest and drink fluids. Advised patient to go to the ER if breathing worsens, feels like she needs more oxygen, has severe headache or cannot bend chin to chest.     Left voice message for Dr. Kiser to call back regarding patient's symptoms.

## 2021-03-27 ENCOUNTER — APPOINTMENT (OUTPATIENT)
Dept: CARDIOLOGY | Facility: CLINIC | Age: 86
DRG: 190 | End: 2021-03-27
Attending: HOSPITALIST
Payer: MEDICARE

## 2021-03-27 ENCOUNTER — APPOINTMENT (OUTPATIENT)
Dept: CT IMAGING | Facility: CLINIC | Age: 86
DRG: 190 | End: 2021-03-27
Attending: INTERNAL MEDICINE
Payer: MEDICARE

## 2021-03-27 ENCOUNTER — APPOINTMENT (OUTPATIENT)
Dept: PHYSICAL THERAPY | Facility: CLINIC | Age: 86
DRG: 190 | End: 2021-03-27
Attending: HOSPITALIST
Payer: MEDICARE

## 2021-03-27 ENCOUNTER — APPOINTMENT (OUTPATIENT)
Dept: OCCUPATIONAL THERAPY | Facility: CLINIC | Age: 86
DRG: 190 | End: 2021-03-27
Attending: HOSPITALIST
Payer: MEDICARE

## 2021-03-27 LAB
ALBUMIN SERPL-MCNC: 3.2 G/DL (ref 3.4–5)
ALP SERPL-CCNC: 136 U/L (ref 40–150)
ALT SERPL W P-5'-P-CCNC: 20 U/L (ref 0–50)
ANION GAP SERPL CALCULATED.3IONS-SCNC: 8 MMOL/L (ref 3–14)
AST SERPL W P-5'-P-CCNC: 24 U/L (ref 0–45)
BILIRUB SERPL-MCNC: 0.6 MG/DL (ref 0.2–1.3)
BUN SERPL-MCNC: 19 MG/DL (ref 7–30)
CALCIUM SERPL-MCNC: 9.3 MG/DL (ref 8.5–10.1)
CHLORIDE SERPL-SCNC: 95 MMOL/L (ref 94–109)
CO2 SERPL-SCNC: 31 MMOL/L (ref 20–32)
CREAT SERPL-MCNC: 0.76 MG/DL (ref 0.52–1.04)
GFR SERPL CREATININE-BSD FRML MDRD: 70 ML/MIN/{1.73_M2}
GLUCOSE SERPL-MCNC: 127 MG/DL (ref 70–99)
INTERPRETATION ECG - MUSE: NORMAL
POTASSIUM SERPL-SCNC: 4 MMOL/L (ref 3.4–5.3)
PROCALCITONIN SERPL-MCNC: 0.38 NG/ML
PROT SERPL-MCNC: 6.9 G/DL (ref 6.8–8.8)
SODIUM SERPL-SCNC: 134 MMOL/L (ref 133–144)

## 2021-03-27 PROCEDURE — 36415 COLL VENOUS BLD VENIPUNCTURE: CPT | Performed by: INTERNAL MEDICINE

## 2021-03-27 PROCEDURE — 97530 THERAPEUTIC ACTIVITIES: CPT | Mod: GO

## 2021-03-27 PROCEDURE — 80053 COMPREHEN METABOLIC PANEL: CPT | Performed by: INTERNAL MEDICINE

## 2021-03-27 PROCEDURE — 99233 SBSQ HOSP IP/OBS HIGH 50: CPT | Performed by: INTERNAL MEDICINE

## 2021-03-27 PROCEDURE — 84145 PROCALCITONIN (PCT): CPT | Performed by: INTERNAL MEDICINE

## 2021-03-27 PROCEDURE — 97116 GAIT TRAINING THERAPY: CPT | Mod: GP | Performed by: PHYSICAL THERAPIST

## 2021-03-27 PROCEDURE — 250N000013 HC RX MED GY IP 250 OP 250 PS 637: Performed by: HOSPITALIST

## 2021-03-27 PROCEDURE — 97161 PT EVAL LOW COMPLEX 20 MIN: CPT | Mod: GP | Performed by: PHYSICAL THERAPIST

## 2021-03-27 PROCEDURE — 94640 AIRWAY INHALATION TREATMENT: CPT

## 2021-03-27 PROCEDURE — 93306 TTE W/DOPPLER COMPLETE: CPT | Mod: 26 | Performed by: INTERNAL MEDICINE

## 2021-03-27 PROCEDURE — 97530 THERAPEUTIC ACTIVITIES: CPT | Mod: GP | Performed by: PHYSICAL THERAPIST

## 2021-03-27 PROCEDURE — 120N000001 HC R&B MED SURG/OB

## 2021-03-27 PROCEDURE — 94640 AIRWAY INHALATION TREATMENT: CPT | Mod: 76

## 2021-03-27 PROCEDURE — 93306 TTE W/DOPPLER COMPLETE: CPT

## 2021-03-27 PROCEDURE — 999N000157 HC STATISTIC RCP TIME EA 10 MIN

## 2021-03-27 PROCEDURE — 250N000009 HC RX 250: Performed by: HOSPITALIST

## 2021-03-27 PROCEDURE — 71250 CT THORAX DX C-: CPT | Mod: ME

## 2021-03-27 PROCEDURE — 97165 OT EVAL LOW COMPLEX 30 MIN: CPT | Mod: GO

## 2021-03-27 PROCEDURE — 250N000011 HC RX IP 250 OP 636: Performed by: HOSPITALIST

## 2021-03-27 RX ORDER — ASPIRIN 81 MG/1
81 TABLET ORAL DAILY
Status: DISCONTINUED | OUTPATIENT
Start: 2021-03-27 | End: 2021-03-30 | Stop reason: HOSPADM

## 2021-03-27 RX ORDER — ATORVASTATIN CALCIUM 20 MG/1
20 TABLET, FILM COATED ORAL DAILY
Status: DISCONTINUED | OUTPATIENT
Start: 2021-03-27 | End: 2021-03-30 | Stop reason: HOSPADM

## 2021-03-27 RX ORDER — ALBUTEROL SULFATE 0.83 MG/ML
2.5 SOLUTION RESPIRATORY (INHALATION) EVERY 6 HOURS PRN
Status: DISCONTINUED | OUTPATIENT
Start: 2021-03-27 | End: 2021-03-30 | Stop reason: HOSPADM

## 2021-03-27 RX ORDER — FUROSEMIDE 20 MG
20 TABLET ORAL DAILY
Status: DISCONTINUED | OUTPATIENT
Start: 2021-03-27 | End: 2021-03-30 | Stop reason: HOSPADM

## 2021-03-27 RX ORDER — ALBUTEROL SULFATE 90 UG/1
2 AEROSOL, METERED RESPIRATORY (INHALATION) EVERY 6 HOURS PRN
Status: DISCONTINUED | OUTPATIENT
Start: 2021-03-27 | End: 2021-03-30 | Stop reason: HOSPADM

## 2021-03-27 RX ORDER — FLUTICASONE PROPIONATE 50 MCG
1 SPRAY, SUSPENSION (ML) NASAL DAILY
Status: DISCONTINUED | OUTPATIENT
Start: 2021-03-27 | End: 2021-03-30 | Stop reason: HOSPADM

## 2021-03-27 RX ORDER — TRAZODONE HYDROCHLORIDE 50 MG/1
50 TABLET, FILM COATED ORAL AT BEDTIME
Status: DISCONTINUED | OUTPATIENT
Start: 2021-03-27 | End: 2021-03-30 | Stop reason: HOSPADM

## 2021-03-27 RX ORDER — METOPROLOL SUCCINATE 25 MG/1
25 TABLET, EXTENDED RELEASE ORAL DAILY
Status: DISCONTINUED | OUTPATIENT
Start: 2021-03-27 | End: 2021-03-30 | Stop reason: HOSPADM

## 2021-03-27 RX ORDER — ACETAMINOPHEN 500 MG
500 TABLET ORAL EVERY 4 HOURS PRN
Status: DISCONTINUED | OUTPATIENT
Start: 2021-03-27 | End: 2021-03-30 | Stop reason: HOSPADM

## 2021-03-27 RX ORDER — FERROUS SULFATE 325(65) MG
325 TABLET ORAL
Status: DISCONTINUED | OUTPATIENT
Start: 2021-03-27 | End: 2021-03-30 | Stop reason: HOSPADM

## 2021-03-27 RX ORDER — IPRATROPIUM BROMIDE AND ALBUTEROL SULFATE 2.5; .5 MG/3ML; MG/3ML
3 SOLUTION RESPIRATORY (INHALATION)
Status: DISCONTINUED | OUTPATIENT
Start: 2021-03-27 | End: 2021-03-30 | Stop reason: HOSPADM

## 2021-03-27 RX ORDER — LIDOCAINE 40 MG/G
CREAM TOPICAL
Status: DISCONTINUED | OUTPATIENT
Start: 2021-03-27 | End: 2021-03-30 | Stop reason: HOSPADM

## 2021-03-27 RX ORDER — METHYLPREDNISOLONE SODIUM SUCCINATE 125 MG/2ML
60 INJECTION, POWDER, LYOPHILIZED, FOR SOLUTION INTRAMUSCULAR; INTRAVENOUS EVERY 12 HOURS
Status: DISCONTINUED | OUTPATIENT
Start: 2021-03-27 | End: 2021-03-28

## 2021-03-27 RX ORDER — LEVOTHYROXINE SODIUM 75 UG/1
75 TABLET ORAL EVERY EVENING
Status: DISCONTINUED | OUTPATIENT
Start: 2021-03-27 | End: 2021-03-30 | Stop reason: HOSPADM

## 2021-03-27 RX ADMIN — FERROUS SULFATE TAB 325 MG (65 MG ELEMENTAL FE) 325 MG: 325 (65 FE) TAB at 09:00

## 2021-03-27 RX ADMIN — ATORVASTATIN CALCIUM 20 MG: 20 TABLET, FILM COATED ORAL at 09:00

## 2021-03-27 RX ADMIN — ASPIRIN 81 MG: 81 TABLET ORAL at 09:07

## 2021-03-27 RX ADMIN — IPRATROPIUM BROMIDE AND ALBUTEROL SULFATE 3 ML: .5; 3 SOLUTION RESPIRATORY (INHALATION) at 07:30

## 2021-03-27 RX ADMIN — METHYLPREDNISOLONE SODIUM SUCCINATE 62.5 MG: 125 INJECTION, POWDER, FOR SOLUTION INTRAMUSCULAR; INTRAVENOUS at 11:32

## 2021-03-27 RX ADMIN — LEVOTHYROXINE SODIUM 75 MCG: 0.07 TABLET ORAL at 20:54

## 2021-03-27 RX ADMIN — TRAZODONE HYDROCHLORIDE 50 MG: 50 TABLET ORAL at 22:07

## 2021-03-27 RX ADMIN — TRAZODONE HYDROCHLORIDE 50 MG: 50 TABLET ORAL at 01:04

## 2021-03-27 RX ADMIN — FUROSEMIDE 20 MG: 20 TABLET ORAL at 09:00

## 2021-03-27 RX ADMIN — METOPROLOL SUCCINATE 25 MG: 25 TABLET, FILM COATED, EXTENDED RELEASE ORAL at 09:00

## 2021-03-27 RX ADMIN — ACETAMINOPHEN 500 MG: 500 TABLET, FILM COATED ORAL at 21:19

## 2021-03-27 RX ADMIN — IPRATROPIUM BROMIDE AND ALBUTEROL SULFATE 3 ML: .5; 3 SOLUTION RESPIRATORY (INHALATION) at 19:16

## 2021-03-27 RX ADMIN — IPRATROPIUM BROMIDE AND ALBUTEROL SULFATE 3 ML: .5; 3 SOLUTION RESPIRATORY (INHALATION) at 12:04

## 2021-03-27 RX ADMIN — IPRATROPIUM BROMIDE AND ALBUTEROL SULFATE 3 ML: .5; 3 SOLUTION RESPIRATORY (INHALATION) at 15:36

## 2021-03-27 RX ADMIN — SERTRALINE HYDROCHLORIDE 50 MG: 50 TABLET ORAL at 20:57

## 2021-03-27 RX ADMIN — METHYLPREDNISOLONE SODIUM SUCCINATE 62.5 MG: 125 INJECTION, POWDER, FOR SOLUTION INTRAMUSCULAR; INTRAVENOUS at 22:08

## 2021-03-27 ASSESSMENT — ACTIVITIES OF DAILY LIVING (ADL)
ADLS_ACUITY_SCORE: 14
ADLS_ACUITY_SCORE: 15
PREVIOUS_RESPONSIBILITIES: MEAL PREP;LAUNDRY;MEDICATION MANAGEMENT
ADLS_ACUITY_SCORE: 15

## 2021-03-27 ASSESSMENT — MIFFLIN-ST. JEOR: SCORE: 718.01

## 2021-03-27 NOTE — PROGRESS NOTES
03/27/21 0905   Quick Adds   Type of Visit Initial Occupational Therapy Evaluation   Living Environment   People in home alone   Current Living Arrangements independent living facility   Home Accessibility no concerns   Transportation Anticipated family or friend will provide   Self-Care   Usual Activity Tolerance good   Current Activity Tolerance fair   Equipment Currently Used at Home grab bar, toilet;grab bar, tub/shower;walker, rolling  (reacher. medical alert)   Activity/Exercise/Self-Care Comment At baseline is independent in self-cares. Uses 4WW. Reports she stands in shower   Disability/Function   Fall history within last six months no   Change in Functional Status Since Onset of Current Illness/Injury yes   General Information   Onset of Illness/Injury or Date of Surgery 03/26/21   Referring Physician Fito Carson MD   Patient/Family Therapy Goal Statement (OT) Return home   Additional Occupational Profile Info/Pertinent History of Current Problem PMH of CLL, CHF, cardiomyopathy, bladder cancer, essential hypertension, hypothyroidism, COPD MI among others, she presents with worsening shortness of breath. Found to have Acute hypoxemic respiratory failure in the setting of COPD with acute exacerbation   Existing Precautions/Restrictions fall   Cognitive Status Examination   Orientation Status orientation to person, place and time   Affect/Mental Status (Cognitive) emotionally labile   Follows Commands follows two-step commands;75-90% accuracy   Memory Deficit minimal deficit;short-term memory;long-term memory   Executive Function Deficit minimal deficit;problem-solving/reasoning   Visual Perception   Visual Impairment/Limitations corrective lenses full-time   Sensory   Sensory Comments Pt denies BUE/BLE  numbness or tingling   Pain Assessment   Patient Currently in Pain Yes, see Vital Sign flowsheet   Strength Comprehensive (MMT)   Comment, General Manual Muscle Testing (MMT) Assessment Generalized  weakness   Bed Mobility   Bed Mobility supine-sit   Supine-Sit [place] (Bed Mobility) supervision   Transfers   Transfers sit-stand transfer;toilet transfer   Sit-Stand Transfer   Sit-Stand [place] (Transfers) supervision   Toilet Transfer   Type (Toilet Transfer) stand-sit;squat pivot   [place] Level (Toilet Transfer) supervision   Toilet Transfer Comments per clinical judgement   Activities of Daily Living   BADL Assessment/Intervention grooming;lower body dressing   Lower Body Dressing Assessment/Training   [place] Level (Lower Body Dressing) supervision   Grooming Assessment/Training   [place] Level (Grooming) supervision   Comment (Grooming) per clinical judgement   Instrumental Activities of Daily Living (IADL)   Previous Responsibilities meal prep;laundry;medication management   IADL Comments has housekeeping services 1x/week   Clinical Impression   Criteria for Skilled Therapeutic Interventions Met (OT) yes;meets criteria;skilled treatment is necessary   OT Diagnosis decline function   OT Problem List-Impairments impacting ADL activity tolerance impaired;mobility;balance;cognition;strength   Assessment of Occupational Performance 5 or more Performance Deficits   Identified Performance Deficits LB dressing, toileting, bathing, functional mobility, IADLs   Planned Therapy Interventions (OT) ADL retraining;IADL retraining;home program guidelines;progressive activity/exercise   Clinical Decision Making Complexity (OT) low complexity   Therapy Frequency (OT) Daily   Predicted Duration of Therapy 5 days   Anticipated Equipment Needs Upon Discharge (OT) shower chair   Risk & Benefits of therapy have been explained evaluation/treatment results reviewed;care plan/treatment goals reviewed;risks/benefits reviewed;current/potential barriers reviewed;participants voiced agreement with care plan;participants included;patient   OT Discharge Planning    OT Discharge Recommendation (DC Rec) home with  home care occupational therapy   OT Rationale for DC Rec Anticipate that with further medical management and therapy participation that pt will progress to discharge home alone and be independent in self-cares. Recommend home OT/PT/RN to progress pt's activity tolerance and IADL independence. Home therapy indicated as pt requires assist and taxing effort to leave her home. Will continue to update discharge recommendation as pt progresses, eval limited in thoroughness as pt demanded to eat breakfast when her breakfast tray arrived during session   Total Evaluation Time (Minutes)   Total Evaluation Time (Minutes) 8

## 2021-03-27 NOTE — ED PROVIDER NOTES
History   Chief Complaint:  Shortness of Breath     HPI   Marie Kline is a 88 year old female with history of chronic leukocytic leukemia since 2004, CHF, COPD, hypertension, NSTEMI, pneumonia, and bladder cancer who presents from [facility] with shortness of breath. The patient reports she saw her PCP 3/24 for a check-up and began to feel sick shortly after. She had a negative COVID test at this time. When she got home she took a 3 hour nap and woke up with increased shortness of breath. She put on 2L O2 which she does not usually use at home. States her O2 sats were 94-95% with oxygen, but would drop to 84% when she walked to the bathroom. She noted associated fatigue and a fever of 101 this morning. She took an aspirin and rechecked her temp, which was then at 102.1. Here in the ED she does not have a fever. States her inhalers and nebs have been helping. She denies any pain complaints at this time. Denies any urinary symptoms, vomiting, or blood in her stool but notes diarrhea for the past month. Denies any chest pain and states her cough is unchanged from baseline. No blood thinners. She called her PCP today who advised she be seen due to concern for pneumonia. Of note, patient gets infusions once a month and her next is scheduled for 4/5. She has also received both COVID vaccinations. Her last WBC count was 153.5 on 1/11/21.    Review of Systems   Constitutional: Positive for fatigue and fever (None currently).   Respiratory: Positive for shortness of breath.    Cardiovascular: Negative for chest pain.   Gastrointestinal: Positive for diarrhea. Negative for abdominal pain, blood in stool and vomiting.   Genitourinary: Negative for decreased urine volume, difficulty urinating, dysuria, frequency, hematuria and urgency.   Musculoskeletal: Negative for back pain and neck pain.   All other systems reviewed and are negative.        Allergies:  Diagnostic X-Ray Materials  Contrast Dye  Prolia      Medications:  Albuterol  Aspirin 81 mg  Lipitor  Lasix  Synthroid  Toprol  Zoloft  Desyrel    Past Medical History:    Arthritis  Bladder cancer  Cardiomyopathy  Chronic lymphocytic leukemia  CHF  COPD  Hypertension  Hypothyroid  NSTEMI  Tricuspid valve disorders  CAD  Pelvic hematoma  Pneumonia  CKD stage 3  Splenomegaly  Sepsis  LESLY     Past Surgical History:    Biopsy lymph node  Bladder surgery  Coronary angiography  Left heart cath  Cystoscopy x3  EGD  ORIF hip left     Family History:    CVD   Cancer    Social History:  Patient presents alone.  Lives at [facility].    Physical Exam     Patient Vitals for the past 24 hrs:   BP Temp Pulse Resp SpO2   03/26/21 2200 137/53 -- 72 -- --   03/26/21 2151 -- -- 76 15 95 %   03/26/21 2150 -- -- 71 24 91 %   03/26/21 2148 -- -- 67 14 (!) 86 %   03/26/21 2147 -- -- 67 20 (!) 87 %   03/26/21 2145 -- -- 67 18 (!) 86 %   03/26/21 2130 122/62 -- 67 22 95 %   03/26/21 2125 -- -- -- -- 96 %   03/26/21 2120 -- -- -- -- 95 %   03/26/21 2115 116/66 -- 67 14 96 %   03/26/21 2100 127/62 -- 63 24 96 %   03/26/21 2050 -- -- 61 23 95 %   03/26/21 2045 116/71 -- 61 23 95 %   03/26/21 2030 124/70 -- 59 24 96 %   03/26/21 2025 -- -- -- -- 96 %   03/26/21 2020 124/61 -- 64 -- 97 %   03/26/21 1812 103/69 97.7  F (36.5  C) 67 26 93 %       Physical Exam  General: Cachectic appearing.  HENT:    Mouth/Throat:  Oral mucosa moist.    Eyes: Conjunctivae are normal. No scleral icterus.  Neck: Neck supple. No cervical adenopathy  Cardiovascular: Normal rate, regular rhythm and intact distal pulses.    Pulmonary/Chest: Diminished breath sounds throughout. Increased work of breathing with increased respiratory rate and retractions. Speaking in phrases.   Abdominal: Soft.  No distension. There is no tenderness.   Musculoskeletal:  No edema, No calf tenderness.  Neurological:Alert and answering questions appropriatel. Coordination normal.   Skin: Skin is warm and dry. Ecchymosis to bilateral  anterior lower legs.  Psychiatric: Normal mood and affect.     Emergency Department Course     ECG  ECG taken at 1820, ECG read at 2036  Sinus rhythm with PSC's, Otherwise normal   No significant change as compared to prior, dated 7/15/19.  Rate 70 bpm. ID interval 190 ms. QRS duration 96 ms. QT/QTc 406/438 ms. P-R-T axes 84 83 69.     Imaging:    XR Chest Port 1 View:  There is a new rounded 3 cm nodule in the right suprahilar region. Consider follow-up chest CT for further evaluation. No evidence for CHF or pneumonia. No pleural effusion or pneumothorax. Lungs are hyperinflated. Heart size normal. Healed right ninth rib fracture posteriorly, as per radiology.     Laboratory:    CBC: WBC: 144.9 (HH), HGB: 11.2 (L), PLT: 102 (L)  BMP: Glucose 140 (H), Sodium: 132 (L), GFR: 55 (L), o/w WNL (Creatinine: 0.92)    Troponin (Collected 2018): <0.015  VBG and oxyhgb: pH 7.38 / PCO2 54 (H) / PO2 37 / Bicarb 32 (H) / FlO2 1L / Oxyhemoglobin 65 / Base excess 6.0  BNP: 2,395 (H)    Symptomatic Influenza A/B & COVID PCR: Pending    Emergency Department Course:    Reviewed:  I reviewed the patient's nursing notes, vitals, past medical records, Care Everywhere.     Assessments:  2010    I evaluated the patient and performed an exam as above.    2129 Per nurse, the patient feels better after one nebulizer treatment and would like to go home.    2140    I updated the patient on results and discussed plan of care. Off oxygen her sats are down so we discussed admission.    Consults:   2204    I spoke with Dr. Orantes of the Oncology service regarding patient's presentation, findings, and plan of care.    2316 I spoke with Dr. Carson of the hospitalist serviceregarding patient's presentation, findings, and plan of care.    Interventions:  2058 Duoneb, 3 mL, Nebulization  2203 Duoneb, 3 mL, Nebulization  2216 Duoneb, 3 mL, Nebulization  2210 Solumedrol 125mg IV    Disposition:  The patient was admitted to the hospital under the care of  Dr. Carson     Impression & Plan         Medical Decision Making:  Marie Kline is a 88 year old female with a complicated past medical history including COPD and CLL who presents with worsening shortness of breath, cough, and hypoxia.  At the onset of the symptoms she had a fever but that has since resolved.  She reports a negative Covid test but I do not have this documented.  Differential diagnosis included but was not limited to pneumonia, COPD exacerbation, Covid pneumonia.  ED evaluation is as noted above she had significant improvement in her work of breathing with one nebulizer treatment and reported feeling much better however continued to remain hypoxic with oxygen saturations in the mid 80s.  Chest x-ray was revealed pulmonary nodules and findings consistent with COPD but no focal infiltrate.  She did have a very elevated white blood cell count as noted above.  This is stable from January.  I did consult with oncology with regard to my plan for treatment.  He stated there were no contraindications and no emergent need to intervene otherwise with related to her CLL.  I spoke with Dr. Carson who is excepted the patient for admission for ongoing evaluation monitoring and management.          Covid-19  Marie Kline was evaluated during a global COVID-19 pandemic, which necessitated consideration that the patient might be at risk for infection with the SARS-CoV-2 virus that causes COVID-19.   Applicable protocols for evaluation were followed during the patient's care.   COVID-19 was considered as part of the patient's evaluation. The plan for testing is:  a test was obtained during this visit.    Diagnosis:    ICD-10-CM    1. COPD exacerbation (H)  J44.1 CBC + differential     Symptomatic Influenza A/B & SARS-CoV2 (COVID-19) Virus PCR Multiplex   2. Hypoxia  R09.02    3. CLL (chronic lymphocytic leukemia) (H)  C91.10    4. Pulmonary nodules  R91.8        Scribe Disclosure:  Yuko HANKS, am serving  as a scribe at 8:27 PM on 3/26/2021 to document services personally performed by Sandi Dillard MD based on my observations and the provider's statements to me.      Sandi Dillard MD  03/26/21 9919

## 2021-03-27 NOTE — PROGRESS NOTES
"CLINICAL NUTRITION SERVICES  -  ASSESSMENT NOTE    Recommendations Ordered by Registered Dietitian (RD):   Continue diet as ordered   Ordered Boost Plus BID    Malnutrition:   % Weight Loss:  > 10% in 6 months (severe malnutrition)  % Intake: unable to determine - no nutrition history   Subcutaneous Fat Loss: unable to determine - pt busy with PT  Muscle Loss: unable to determine - pt busy with PT   Fluid Retention:  None noted    Malnutrition Diagnosis: Unable to determine due to lack of nutrition focused physical exam      REASON FOR ASSESSMENT  Marie Kline is a 88 year old female seen by Registered Dietitian for Admission Nutrition Risk Screen for positive    NUTRITION HISTORY  - Information obtained from chart, pt busy with PT at time of visit   - Patient admitted for acute hypoxemic respiratory failure in the setting of COPD with acute exacerbation  - History of CLL, CHF, cardiomyopathy, bladder cancer, essential hypertension, hypothyroidism, COPD MI among others  - Unable to obtain nutrition history from patient, see above  - Food allergies: NKFA    CURRENT NUTRITION ORDERS  Diet Order:     Low Saturated Fat/2400 mg Sodium/No Caffeine     Current Intake/Tolerance:  No information recorded in the flowsheets about PO intake to comment on as pt recently admitted     NUTRITION FOCUSED PHYSICAL ASSESSMENT FOR DIAGNOSING MALNUTRITION)  No:  Unable to assess, pt busy with PT              Observed:    Unable to observe, see above     Obtained from Chart/Interdisciplinary Team:  - PT, OT and social work/care management are consulted     ANTHROPOMETRICS  Height: 4' 11\"  Weight: 38.2 kg ( 84 lbs 4.8 oz)   Body mass index is 17.03 kg/m .  Weight Status:  Underweight BMI <18.5  Weight History: weight is down 8.1 kg over the past 10 months and 4.9 kg over the past 6 months. This equates to a weight loss of 17% and 11% respectively.   Wt Readings from Last 10 Encounters:   03/27/21 38.2 kg (84 lb 4.8 oz)   03/24/21 40.4 kg " (89 lb 1.6 oz)   01/06/21 40.2 kg (88 lb 11 oz)   09/10/20 43.1 kg (95 lb 1.6 oz)   09/03/20 42.6 kg (94 lb)   07/09/20 42.6 kg (94 lb)   05/21/20 46.3 kg (102 lb)   03/05/20 44.5 kg (98 lb 1.6 oz)   01/22/20 43.7 kg (96 lb 6.4 oz)   11/13/19 45.6 kg (100 lb 8 oz)     LABS  Labs reviewed    Electrolytes  Potassium (mmol/L)   Date Value   03/26/2021 3.9   01/06/2021 4.0   06/30/2020 3.6     Phosphorus (mg/dL)   Date Value   05/01/2019 3.8   03/12/2018 3.9   05/12/2017 3.3   03/27/2017 2.9   03/26/2017 2.3 (L)    Blood Glucose  Glucose (mg/dL)   Date Value   03/26/2021 140 (H)   01/06/2021 90   06/30/2020 117 (H)   10/15/2019 115 (H)   08/16/2019 127 (H)     Hemoglobin A1C (%)   Date Value   06/17/2016 5.6   06/16/2016 5.6   10/09/2014 5.6   07/18/2014 5.7    Inflammatory Markers  WBC (10e9/L)   Date Value   03/26/2021 144.9 (HH)   10/15/2019 49.2 (HH)   08/19/2019 35.7 (H)     Albumin (g/dL)   Date Value   06/30/2020 3.7   07/21/2019 1.7 (L)   07/20/2019 1.9 (L)      Magnesium (mg/dL)   Date Value   05/01/2019 2.0   12/08/2018 2.4 (H)   03/12/2018 2.1     Sodium (mmol/L)   Date Value   03/26/2021 132 (L)   01/06/2021 139   06/30/2020 135    Renal  Urea Nitrogen (mg/dL)   Date Value   03/26/2021 15   01/06/2021 14   06/30/2020 8     Creatinine (mg/dL)   Date Value   03/26/2021 0.92   01/06/2021 0.87   06/30/2020 0.90     Additional  Triglycerides (mg/dL)   Date Value   01/06/2021 102   05/01/2019 160 (H)   03/12/2018 98     Ketones Urine (mg/dL)   Date Value   11/13/2019 Negative        MEDICATIONS  Medications reviewed    aspirin  81 mg Oral Daily     atorvastatin  20 mg Oral Daily     ferrous sulfate  325 mg Oral Daily with breakfast     fluticasone  1 spray Both Nostrils Daily     furosemide  20 mg Oral Daily     ipratropium - albuterol 0.5 mg/2.5 mg/3 mL  3 mL Nebulization 4x daily     levothyroxine  75 mcg Oral QPM     methylPREDNISolone  62.5 mg Intravenous Q12H     metoprolol succinate ER  25 mg Oral Daily      sertraline  50 mg Oral QPM     sodium chloride (PF)  3 mL Intracatheter Q8H     traZODone  50 mg Oral At Bedtime         acetaminophen, albuterol, albuterol, lidocaine 4%, lidocaine (buffered or not buffered), melatonin, sodium chloride (PF), sodium chloride (PF)     ASSESSED NUTRITION NEEDS PER APPROVED PRACTICE GUIDELINES:    Dosing Weight 38.2 kg   Estimated Energy Needs: 5669-4243 kcals (35-40 Kcal/Kg)  Justification: repletion  Estimated Protein Needs: 57-76 grams protein (1.5-2 g pro/Kg)  Justification: Repletion and preservation of lean body mass  Estimated Fluid Needs: per MD     MALNUTRITION:  % Weight Loss:  > 10% in 6 months (severe malnutrition)  % Intake: unable to determine - no nutrition history   Subcutaneous Fat Loss: unable to determine - pt busy with PT  Muscle Loss: unable to determine - pt busy with PT   Fluid Retention:  None noted    Malnutrition Diagnosis: Unable to determine due to lack of nutrition focused physical exam     NUTRITION DIAGNOSIS:  Predicted inadequate nutrient intake related to potential for PO intake to decline during admission pending LOS    NUTRITION INTERVENTIONS  Recommendations / Nutrition Prescription  Continue diet as ordered   Ordered Boost Plus BID     Implementation  Nutrition education: unable to provide patient with education at this time   Medical Food Supplement: as above     Nutrition Goals  Pt to consume >/=75% of meals ordered TID     MONITORING AND EVALUATION:  Progress towards goals will be monitored and evaluated per protocol and Practice Guidelines    Rossana Jenkins, RD, LD  Clinical Dietitian

## 2021-03-27 NOTE — H&P
"[facility]    History and Physical  Hospitalist     Date of Admission:  3/26/2021  Date of Service (when I saw the patient): 03/27/21  Provider: Fito Carson MD      Chief Complaint   SOB    History is obtained from the patient, electronic health record and emergency department physician    History of Present Illness   Marie Kline is a 88 year old female who has a history of CLL, CHF, cardiomyopathy, bladder cancer, essential hypertension, hypothyroidism, COPD MI among others, she presents with worsening shortness of breath.  She has been sick for 48 hours.  She is not oxygen dependent, at least she did not use oxygen in the last 1 year until this episode that he was in the last day having low oxygen at home.  She has been using 2 L/min to keep her saturation close to 90%.  She documented oxygen dropped to mid 80s specifically 84% on exertion.  She has reported some fever up to 101-102.  She has been using her home inhalers frequently.  She came to the emergency department for assessment.  Patient feels better after nebulization Solu-Medrol given in the emergency department.  ED physician Dr Dillard has requested her admission for further treatment.  Vital signs:  Temp: 97.7  F (36.5  C)   BP: 137/53 Pulse: 86   Resp: 22 SpO2: 96 % O2 Device: Nasal cannula Oxygen Delivery: 2 LPM      Estimated body mass index is 18 kg/m  as calculated from the following:    Height as of 3/24/21: 1.499 m (4' 11\").    Weight as of 3/24/21: 40.4 kg (89 lb 1.6 oz).    Lab w/u:  CBC with differential leukocytosis of 144 K.  Hemoglobin 11.2, platelet 102.  Absolute lymphocyte count 134.8 monocyte 4.3.  Chemistry sodium 132, glomerular filtration rate 55 rest of the values normal.  NT proBNP 2395.  Troponin IES <0.015.  Glycemia 140.  Chest x-ray.  IMPRESSION: There is a new rounded 3 cm nodule in the right suprahilar region. Consider follow-up chest CT for further evaluation. No evidence for CHF or pneumonia. " No pleural effusion or pneumothorax. Lungs are hyperinflated. Heart size normal. Healed   right ninth rib fracture posteriorly.    Past Medical History    I have reviewed this patient's medical history and updated it with pertinent information if needed.   Past Medical History:   Diagnosis Date     Arthritis     Generalized     Bladder cancer (H)      Bladder cancer (H)      Cardiomyopathy (H)      CLL (chronic lymphocytic leukemia) (H)      Congestive heart failure (H) 10/24/2014    flash pulm edema ass w/Takotsubo     COPD (chronic obstructive pulmonary disease) (H)     noc O2     Hypertension      Hypothyroid      Mumps      Myocardial infarction (H) 10/2014    Takotsubo presentation w/mild CAD     Pulmonary edema cardiac cause (H) 10/08/2014    associated w/Takotsubo ACS     Tricuspid valve disorders, 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    TR 2-3+ per echo         Assessment & Plan   Marie Kline is a 88 year old female who has CLL and COPD who presents with worsening shortness of breath and hypoxemia.    1.  Acute hypoxemic respiratory failure in the setting of COPD with acute exacerbation.  Unclear trigger event, could be multifactorial with a viral superimposed infection  2.  History of COPD not oxygen dependent.  3.  Chronic lymphocytic leukemia.  -Follow-up with oncology, ER doctor consulted oncologist over the phone, they do not plan to do any different treatment or intervention.  4.  Essential hypertension  On metoprolol and furosemide.  5.  Hypothyroidism.    Thyroid supplement.  6.  History of congestive heart failure.  With current NT proBNP elevation some component of diastolic dysfunction is not totally excluded.  On metoprolol, furosemide, aspirin, atorvastatin.    Plan.  Inpatient   Strict KATIE's.  Daily weight  Telemetry   O2 per nasal cannula as needed to keep oxygen saturation 92% or higher.  Cardiac diet  Solu-Medrol 62.5 mg IV twice a day  Albuterol as needed  DuoNeb nebulization every 4  hours.  Lasix 20 mg 1 tablet daily  Metoprolol ER 25 mg daily  Atorvastatin 20 mg 1 tablet daily  Obtain echocardiogram.      Code Status   Full Code    Primary Care Physician   Piper Kiser      Past Surgical History   I have reviewed this patient's surgical history and updated it with pertinent information if needed.  Past Surgical History:   Procedure Laterality Date     BIOPSY LYMPH NODE INGUINAL Right 10/23/2018    Procedure: excsional biopsy right inguinal lymph node;  Surgeon: Reji Connors MD;  Location: [facility] OR     BLADDER SURGERY       CORONARY ANGIOGRAPHY ADULT ORDER  10/2014    minimal CAD     CV LEFT HEART CATH N/A 2018    Procedure: Left Heart Cath;  Surgeon: Sadiq Carrasco MD;  Location:  HEART CARDIAC CATH LAB     CYSTOSCOPY       CYSTOSCOPY, BIOPSY BLADDER, COMBINED N/A 2016    Procedure: COMBINED CYSTOSCOPY, BIOPSY BLADDER;  Surgeon: Kar Nuñez MD;  Location: [facility] OR     CYSTOSCOPY, TRANSURETHRAL RESECTION (TUR) TUMOR BLADDER, COMBINED N/A 2016    Procedure: COMBINED CYSTOSCOPY, TRANSURETHRAL RESECTION (TUR) TUMOR BLADDER;  Surgeon: Kar Nuñez MD;  Location:  OR     ESOPHAGOSCOPY, GASTROSCOPY, DUODENOSCOPY (EGD), COMBINED N/A 2016    Procedure: COMBINED ESOPHAGOSCOPY, GASTROSCOPY, DUODENOSCOPY (EGD);  Surgeon: Freddie Diez MD;  Location:  GI     OPEN REDUCTION INTERNAL FIXATION HIP BIPOLAR Left 2018    Procedure: Left bipolar hemiarthroplasty;  Surgeon: Scar Reynolds MD;  Location:  OR       Prior to Admission Medications   Prior to Admission Medications   Prescriptions Last Dose Informant Patient Reported? Taking?   Immune Globulin, Human, (OCTAGAM) 5 GM/100ML SOLN 3/1/2021 at next does   No Yes   Si mg/kg into the vein every 30 days    Hold this medication while in TCU-resume at discharge from TCU   Multiple Vitamins-Minerals (MULTIVITAMIN ADULT) CHEW 3/26/2021 at am  Yes Yes   Sig: Take 2 chew  tab by mouth daily   albuterol (PROVENTIL HFA) 108 (90 Base) MCG/ACT inhaler 3/26/2021 at Unknown time  Yes Yes   Sig: Inhale 2 puffs into the lungs every 6 hours as needed for shortness of breath / dyspnea or wheezing   albuterol (PROVENTIL) (2.5 MG/3ML) 0.083% neb solution 3/26/2021 at Unknown time  No Yes   Sig: Take 1 vial (2.5 mg) by nebulization every 6 hours as needed for shortness of breath / dyspnea or wheezing   aspirin 81 MG EC tablet 3/26/2021 at am  Yes Yes   Sig: Take 81 mg by mouth daily   atorvastatin (LIPITOR) 20 MG tablet 3/26/2021 at am  No Yes   Sig: Take 1 tablet (20 mg) by mouth daily   ferrous sulfate (IRON) 325 (65 FE) MG tablet 3/26/2021 at am  Yes Yes   Sig: Take 325 mg by mouth daily (with breakfast)    fluticasone (FLONASE) 50 MCG/ACT nasal spray 3/26/2021 at Unknown time  No Yes   Sig: Spray 1 spray into both nostrils daily   furosemide (LASIX) 20 MG tablet 3/26/2021 at am  No Yes   Sig: Take 1 tablet (20 mg) by mouth daily   levothyroxine (SYNTHROID/LEVOTHROID) 75 MCG tablet 3/25/2021 at pm  No Yes   Sig: Take 1 tablet (75 mcg) by mouth daily   metoprolol succinate ER (TOPROL-XL) 25 MG 24 hr tablet 3/26/2021 at am  No Yes   Sig: Take 1 tablet (25 mg) by mouth daily   order for DME   Yes No   Sig: Equipment being ordered: Oxygen 2LNC continuous with portability tank due to mobility in the home.     Length of need is 99 months.   order for DME   No No   Sig: Equipment being ordered: Oxygen concentrator, O2-85% resting room air & 02-82% walking room air, Flow rate 2L   sertraline (ZOLOFT) 50 MG tablet 3/25/2021 at pm  No Yes   Sig: Take 1 tablet (50 mg) by mouth daily   traZODone (DESYREL) 50 MG tablet 3/25/2021 at pm  Yes Yes   Sig: Take 50 mg by mouth At Bedtime      Facility-Administered Medications: None     Allergies   Allergies   Allergen Reactions     Diagnostic X-Ray Materials Hives     CT DYE     Contrast Dye Hives     CT DYE     Prolia [Denosumab]      Osteonecrosis jaw        Wound Dressing Adhesive        Social History   I have personally reviewed the social history with the patient showing.  Social History     Tobacco Use     Smoking status: Former Smoker     Types: Cigarettes     Quit date: 2/3/1996     Years since quittin.1     Smokeless tobacco: Never Used   Substance Use Topics     Alcohol use: No     Alcohol/week: 0.0 standard drinks     Family History   I have reviewed this patient's family history and it is not contributory to the admission .       Review of Systems   Except as noted in the HPI, a 12-system Review of Systems was found to be negative.       Physical Exam   Vital Signs with Ranges  Temp:  [97.7  F (36.5  C)] 97.7  F (36.5  C)  Pulse:  [59-76] 73  Resp:  [14-26] 17  BP: (103-137)/(53-71) 137/53  SpO2:  [86 %-97 %] 95 %  0 lbs 0 oz    GEN:  Malnourished. Alert, oriented x 3, appears comfortable, NAD.  HEENT:  Normocephalic/atraumatic, no scleral icterus, no nasal discharge, mouth moist.  CV:  Muffled sounds, PAC's, no murmur or JVD.  S1 + S2 noted, no S3 or S4.  LUNGS:  Globally diminished to auscultation bilaterally without rhonchi/wheezing/retractions.  Symmetric chest rise on inhalation noted.  ABD:  Active bowel sounds, soft, non-tender/non-distended.  No rebound/guarding/rigidity.  EXT:  No edema or cyanosis.  No joint synovitis noted.  SKIN:  Dry to touch, no exanthems noted in the visualized areas.       Data   I personally reviewed the EKG tracing showing Sinus rhythm with PAC's .  Results for orders placed or performed during the hospital encounter of 21 (from the past 24 hour(s))   EKG 12 lead   Result Value Ref Range    Interpretation ECG Click View Image link to view waveform and result    CBC + differential   Result Value Ref Range    .9 (HH) 4.0 - 11.0 10e9/L    RBC Count 3.73 (L) 3.8 - 5.2 10e12/L    Hemoglobin 11.2 (L) 11.7 - 15.7 g/dL    Hematocrit 35.5 35.0 - 47.0 %    MCV 95 78 - 100 fl    MCH 30.0 26.5 - 33.0 pg    MCHC 31.5  31.5 - 36.5 g/dL    RDW 16.5 (H) 10.0 - 15.0 %    Platelet Count 102 (L) 150 - 450 10e9/L    Diff Method Manual Differential     % Neutrophils 4.0 %    % Lymphocytes 93.0 %    % Monocytes 3.0 %    % Eosinophils 0.0 %    % Basophils 0.0 %    Absolute Neutrophil 5.8 1.6 - 8.3 10e9/L    Absolute Lymphocytes 134.8 (H) 0.8 - 5.3 10e9/L    Absolute Monocytes 4.3 (H) 0.0 - 1.3 10e9/L    Absolute Eosinophils 0.0 0.0 - 0.7 10e9/L    Absolute Basophils 0.0 0.0 - 0.2 10e9/L    Anisocytosis Slight     Ovalocytes Slight     Macrocytes Present     Platelet Estimate       Automated count confirmed.  Platelet morphology is normal.   Basic metabolic panel (BMP)   Result Value Ref Range    Sodium 132 (L) 133 - 144 mmol/L    Potassium 3.9 3.4 - 5.3 mmol/L    Chloride 95 94 - 109 mmol/L    Carbon Dioxide 31 20 - 32 mmol/L    Anion Gap 6 3 - 14 mmol/L    Glucose 140 (H) 70 - 99 mg/dL    Urea Nitrogen 15 7 - 30 mg/dL    Creatinine 0.92 0.52 - 1.04 mg/dL    GFR Estimate 55 (L) >60 mL/min/[1.73_m2]    GFR Estimate If Black 64 >60 mL/min/[1.73_m2]    Calcium 8.8 8.5 - 10.1 mg/dL   Troponin I (now)   Result Value Ref Range    Troponin I ES <0.015 0.000 - 0.045 ug/L   Blood gas venous and oxyhgb   Result Value Ref Range    Ph Venous 7.38 7.32 - 7.43 pH    PCO2 Venous 54 (H) 40 - 50 mm Hg    PO2 Venous 37 25 - 47 mm Hg    Bicarbonate Venous 32 (H) 21 - 28 mmol/L    FIO2 1     Oxyhemoglobin Venous 65 %    Base Excess Venous 6.0 mmol/L   Nt probnp inpatient   Result Value Ref Range    N-Terminal Pro BNP Inpatient 2,395 (H) 0 - 1,800 pg/mL   XR Chest Port 1 View    Narrative    EXAM: XR CHEST PORT 1 VW  LOCATION: [facility]  DATE/TIME: 3/26/2021 8:34 PM    INDICATION: Respiratory distress  COMPARISON: 03/05/2020      Impression    IMPRESSION: There is a new rounded 3 cm nodule in the right suprahilar region. Consider follow-up chest CT for further evaluation. No evidence for CHF or pneumonia. No pleural effusion or pneumothorax.  Lungs are hyperinflated. Heart size normal. Healed   right ninth rib fracture posteriorly.       Disclaimer: This note consists of symbols derived from keyboarding, dictation and/or voice recognition software. As a result, there may be errors in the script that have gone undetected. Please consider this when interpreting information found in this chart.

## 2021-03-27 NOTE — PROGRESS NOTES
"SPIRITUAL HEALTH SERVICES Progress Note  FR Med/Surg 6    Visited with pt due to request for Spiritual Health at admission.  Marie shared that she is [employer]ian[employer] and prayer is a daily part of her life.  Her prayers help her to give over control and worry to God who she knows is always with her and watching over her.  She finds great comfort in her reinier, said she is at peace with knowing where she will go when she dies, and trusts that God will take care of her current health issues.  Facilitated sharing about family including her nearby \"adopted\" son who provides a lot of support for Marie.  Marie requested prayer for healing, for the world, and for her children.    Marie expressed immense gratitude for all the nurses and other staff who are taking care of her.    Spiritual Health remains available for any additional spiritual or emotional support needs as requested.      Marcia Schwartz  Chaplain Resident  "

## 2021-03-27 NOTE — PLAN OF CARE
Pt A/O, afebrile. Denies pain. Tele is sinus nicolle. 1L O2. Cardiac diet. Voiding and BM overnight x1. Up with assist of 1 with walker/gb.

## 2021-03-27 NOTE — PROGRESS NOTES
[facility]    Medicine Progress Note - Hospitalist Service       Date of Admission:  3/26/2021  Assessment & Plan          Marie Kline is a 88 year old female who has a history of CLL, CHF, cardiomyopathy, bladder cancer, essential hypertension, hypothyroidism, COPD MI among others, she presents with worsening shortness of breath.  She has been sick for 48 hours.  She is not oxygen dependent, at least she did not use oxygen in the last 1 year until this episode that he was in the last day having low oxygen at home.  She has been using 2 L/min to keep her saturation close to 90%.  She documented oxygen dropped to mid 80s specifically 84% on exertion.  She has reported some fever up to 101-102.  She has been using her home inhalers frequently.  She came to the emergency department for assessment.  Marie Kline is a 88 year old female who has CLL and COPD who presents with worsening shortness of breath and hypoxemia.    1.  Acute hypoxemic respiratory failure in the setting of COPD with acute exacerbation.  Unclear trigger event, could be multifactorial with a viral superimposed infection  2.  History of COPD not oxygen dependent.  3.  Chronic lymphocytic leukemia.  -Follow-up with oncology, ER doctor consulted oncologist over the phone, they do not plan to do any different treatment or intervention.  4.  Essential hypertension  On metoprolol and furosemide.  5.  Hypothyroidism.    Thyroid supplement.  6.  History of congestive heart failure.  With current NT proBNP elevation some component of diastolic dysfunction is not totally excluded.  On metoprolol, furosemide, aspirin, atorvastatin.    Plan.  Inpatient   Strict KATIE's.  Daily weight  Telemetry   O2 per nasal cannula as needed to keep oxygen saturation 92% or higher.  Cardiac diet  Solu-Medrol 62.5 mg IV twice a day  Albuterol as needed  DuoNeb nebulization every 4 hours.  Lasix 20 mg 1 tablet daily  Metoprolol ER 25 mg daily  Atorvastatin  20 mg 1 tablet daily  Obtain echocardiogram.  Check procal, CT chest and RUQ U/S.           Diet: Combination Diet Low Saturated Fat Na <2400mg Diet, No Caffeine Diet    DVT Prophylaxis: Pneumatic Compression Devices  Rees Catheter: in place, indication:    Code Status: No CPR- Do NOT Intubate           Disposition Plan   Expected discharge: 2 - 3 days, recommended to prior living arrangement once antibiotic plan established and O2 use less than 0 liters/minute.  Entered: Rangel Alvarez MD 03/27/2021, 10:41 AM       The patient's care was discussed with the Bedside Nurse and Patient.    Rangel Alvarez MD  Hospitalist Service  [facility]  Contact information available via [facility] Paging/Directory    ______________________________________________________________________    Interval History     Mild cough and SOB. No fever overnight. Mild RUQ pain. No N/V.    Data reviewed today: I reviewed all medications, new labs and imaging results over the last 24 hours. I personally reviewed no images or EKG's today.    Physical Exam   Vital Signs: Temp: 97.2  F (36.2  C) Temp src: Temporal BP: 115/50 Pulse: 73   Resp: 18 SpO2: 97 % O2 Device: Nasal cannula Oxygen Delivery: 1/2 LPM  Weight: 84 lbs 4.8 oz    Gen - AAO x 3, frail and chronically ill appearing in NAD.  Lungs - diminished BS.  Heart - distant HS, RR,S1+S2 nml, no m/g/r.  Abd - soft, + ttp RUQ, ND, + BS.  Ext - no edema.    Data   Recent Labs   Lab 03/26/21 2018   .9*   HGB 11.2*   MCV 95   *   *   POTASSIUM 3.9   CHLORIDE 95   CO2 31   BUN 15   CR 0.92   ANIONGAP 6   CARLOS 8.8   *   TROPI <0.015     Recent Results (from the past 24 hour(s))   XR Chest Port 1 View    Narrative    EXAM: XR CHEST PORT 1 VW  LOCATION: [facility]  DATE/TIME: 3/26/2021 8:34 PM    INDICATION: Respiratory distress  COMPARISON: 03/05/2020      Impression    IMPRESSION: There is a new rounded 3 cm nodule in the right suprahilar  region. Consider follow-up chest CT for further evaluation. No evidence for CHF or pneumonia. No pleural effusion or pneumothorax. Lungs are hyperinflated. Heart size normal. Healed   right ninth rib fracture posteriorly.     Medications       aspirin  81 mg Oral Daily     atorvastatin  20 mg Oral Daily     ferrous sulfate  325 mg Oral Daily with breakfast     fluticasone  1 spray Both Nostrils Daily     furosemide  20 mg Oral Daily     ipratropium - albuterol 0.5 mg/2.5 mg/3 mL  3 mL Nebulization 4x daily     levothyroxine  75 mcg Oral QPM     methylPREDNISolone  62.5 mg Intravenous Q12H     metoprolol succinate ER  25 mg Oral Daily     sertraline  50 mg Oral QPM     sodium chloride (PF)  3 mL Intracatheter Q8H     traZODone  50 mg Oral At Bedtime

## 2021-03-27 NOTE — PHARMACY-ADMISSION MEDICATION HISTORY
Admission medication history interview status for this patient is complete. See [facility] admission navigator for allergy information, prior to admission medications and immunization status.     Medication history interview done, indicate source(s): Patient  Medication history resources (including written lists, pill bottles, clinic record):None  Pharmacy: [id number] - [facility] [address]    Changes made to PTA medication list:  Added: none  Deleted: Sodium chloride nasal spray  Changed: Trazodone 100mg at bedtime -> 50mg    Actions taken by pharmacist (provider contacted, etc):None     Additional medication history information:None    Medication reconciliation/reorder completed by provider prior to medication history?  No     For patients on insulin therapy:   Do you use sliding scale insulin based on blood sugars?   What is your pre-meal insulin coverage?    Do you typically eat three meals a day?   How many times do you check your blood glucose per day?   How many episodes of hypoglycemia do you typically have per month?   Do you have a Continuous Glucose Monitor (CGM)?      Prior to Admission medications    Medication Sig Last Dose Taking? Auth Provider   albuterol (PROVENTIL HFA) 108 (90 Base) MCG/ACT inhaler Inhale 2 puffs into the lungs every 6 hours as needed for shortness of breath / dyspnea or wheezing 3/26/2021 at Unknown time Yes    albuterol (PROVENTIL) (2.5 MG/3ML) 0.083% neb solution Take 1 vial (2.5 mg) by nebulization every 6 hours as needed for shortness of breath / dyspnea or wheezing 3/26/2021 at Unknown time Yes Piper Kiser MD   aspirin 81 MG EC tablet Take 81 mg by mouth daily 3/26/2021 at am Yes Unknown, Entered By History   atorvastatin (LIPITOR) 20 MG tablet Take 1 tablet (20 mg) by mouth daily 3/26/2021 at am Yes Piper Kiser MD   ferrous sulfate (IRON) 325 (65 FE) MG tablet Take 325 mg by mouth daily (with breakfast)  3/26/2021 at am Yes Reported, Patient    fluticasone (FLONASE) 50 MCG/ACT nasal spray Spray 1 spray into both nostrils daily 3/26/2021 at Unknown time Yes Piper Kiser MD   furosemide (LASIX) 20 MG tablet Take 1 tablet (20 mg) by mouth daily 3/26/2021 at am Yes Piper Kiser MD   Immune Globulin, Human, (OCTAGAM) 5 GM/100ML SOLN 300 mg/kg into the vein every 30 days    Hold this medication while in TCU-resume at discharge from TCU 3/1/2021 at next does April 5th Yes Fide Wilkes MD   levothyroxine (SYNTHROID/LEVOTHROID) 75 MCG tablet Take 1 tablet (75 mcg) by mouth daily 3/25/2021 at pm Yes Piper Kiser MD   metoprolol succinate ER (TOPROL-XL) 25 MG 24 hr tablet Take 1 tablet (25 mg) by mouth daily 3/26/2021 at am Yes Piper Kiser MD   Multiple Vitamins-Minerals (MULTIVITAMIN ADULT) CHEW Take 2 chew tab by mouth daily 3/26/2021 at am Yes Reported, Patient   sertraline (ZOLOFT) 50 MG tablet Take 1 tablet (50 mg) by mouth daily 3/25/2021 at pm Yes Piper Kiser MD   traZODone (DESYREL) 50 MG tablet Take 50 mg by mouth At Bedtime 3/25/2021 at pm Yes Unknown, Entered By History   order for DME Equipment being ordered: Oxygen concentrator, O2-85% resting room air & 02-82% walking room air, Flow rate 2L   Dillan Tang MD   order for DME Equipment being ordered: Oxygen 2LNC continuous with portability tank due to mobility in the home.     Length of need is 99 months.   Sadie Youngblood, NOHEMI CNP

## 2021-03-27 NOTE — ED NOTES
[facility]  ED Nurse Handoff Report    Marie Kline is a 88 year old female   ED Chief complaint: Shortness of Breath  . ED Diagnosis:   Final diagnoses:   COPD exacerbation (H)   Hypoxia   CLL (chronic lymphocytic leukemia) (H)   Pulmonary nodules     Allergies:   Allergies   Allergen Reactions     Diagnostic X-Ray Materials Hives     CT DYE     Contrast Dye Hives     CT DYE     Prolia [Denosumab]      Osteonecrosis jaw       Wound Dressing Adhesive        Code Status: DNR / DNI  Activity level - Baseline/Home:  Stand by Assist. Activity Level - Current:   Assist X 1. Lift room needed: No. Bariatric: No   Needed: No   Isolation: Yes. Infection: Not Applicable  COVID r/o and special precautions.     Vital Signs:   Vitals:    03/26/21 2150 03/26/21 2151 03/26/21 2200 03/26/21 2235   BP:   137/53    Pulse: 71 76 72 73   Resp: 24 15  17   Temp:       SpO2: 91% 95%  95%       Cardiac Rhythm:  ,      Pain level:    Patient confused: No. Patient Falls Risk: Yes.   Elimination Status: Has voided   Patient Report - Initial Complaint: SOB. Focused Assessment:  Marie Kline is a 88 year old female with history of chronic leukocytic leukemia since 2004, CHF, COPD, hypertension, NSTEMI, pneumonia, and bladder cancer who presents from [facility] with shortness of breath. The patient reports she saw her PCP 3/24 for a check-up and began to feel sick shortly after. She had a negative COVID test at this time. When she got home she took a 3 hour nap and woke up with increased shortness of breath. She put on 2L O2 which she does not usually use at home. States her O2 sats were 94-95% with oxygen, but would drop to 84% when she walked to the bathroom. She noted associated fatigue and a fever of 101 this morning. She took an aspirin and rechecked her temp, which was then at 102.1. Here in the ED she does not have a fever. States her inhalers and nebs have been helping. She denies any pain complaints at this  time. Denies any urinary symptoms, vomiting, or blood in her stool but notes diarrhea for the past month. Denies any chest pain and states her cough is unchanged from baseline. No blood thinners. She called her PCP today who advised she be seen due to concern for pneumonia. Of note, patient gets infusions once a month and her next is scheduled for 4/5. She has also received both COVID vaccinations. Her last WBC count was 153.5 on 1/11/21.  Tests Performed: EKG, labs, xray. Abnormal Results:   Labs Ordered and Resulted from Time of ED Arrival Up to the Time of Departure from the ED   CBC WITH PLATELETS DIFFERENTIAL - Abnormal; Notable for the following components:       Result Value    .9 (*)     RBC Count 3.73 (*)     Hemoglobin 11.2 (*)     RDW 16.5 (*)     Platelet Count 102 (*)     Absolute Lymphocytes 134.8 (*)     Absolute Monocytes 4.3 (*)     All other components within normal limits   BASIC METABOLIC PANEL - Abnormal; Notable for the following components:    Sodium 132 (*)     Glucose 140 (*)     GFR Estimate 55 (*)     All other components within normal limits   BLOOD GAS VENOUS AND OXYHGB - Abnormal; Notable for the following components:    PCO2 Venous 54 (*)     Bicarbonate Venous 32 (*)     All other components within normal limits   NT PROBNP INPATIENT - Abnormal; Notable for the following components:    N-Terminal Pro BNP Inpatient 2,395 (*)     All other components within normal limits   TROPONIN I   INFLUENZA A/B & SARS-COV2 PCR MULTIPLEX     XR Chest Port 1 View   Final Result   IMPRESSION: There is a new rounded 3 cm nodule in the right suprahilar region. Consider follow-up chest CT for further evaluation. No evidence for CHF or pneumonia. No pleural effusion or pneumothorax. Lungs are hyperinflated. Heart size normal. Healed    right ninth rib fracture posteriorly.         Treatments provided: see MAR  Family Comments: N/A  OBS brochure/video discussed/provided to patient:  No  ED  Medications:   Medications   ipratropium - albuterol 0.5 mg/2.5 mg/3 mL (DUONEB) neb solution 3 mL (3 mLs Nebulization Given 3/26/21 2216)   methylPREDNISolone sodium succinate (solu-MEDROL) injection 125 mg (125 mg Intravenous Given 3/26/21 2234)     Drips infusing:  No  For the majority of the shift, the patient's behavior Green. Interventions performed were N/A.    Sepsis treatment initiated: No     Patient tested for COVID 19 prior to admission: YES    RECEIVING UNIT ED HANDOFF REVIEW    Above ED Nurse Handoff Report was reviewed: Yes    Reviewed by: Yodit Christianson RN on March 26, 2021 at 11:52 PM   ED Nurse Name/Phone Number: Livier Cruz RN,   11:18 PM

## 2021-03-27 NOTE — CONSULTS
Care Management Initial Consult    General Information  Assessment completed with: Patient,    Type of CM/SW Visit: Initial Assessment    Primary Care Provider verified and updated as needed:     Readmission within the last 30 days:        Reason for Consult: discharge planning  Advance Care Planning:            Communication Assessment  Patient's communication style: spoken language (English or Bilingual)    Hearing Difficulty or Deaf: no   Wear Glasses or Blind: yes    Cognitive  Cognitive/Neuro/Behavioral: .WDL except  Level of Consciousness: alert  Arousal Level: opens eyes spontaneously  Orientation: oriented x 4  Mood/Behavior: cooperative, calm  Best Language: 0 - No aphasia  Speech: clear    Living Environment:   People in home: alone     Current living Arrangements: independent living facility      Able to return to prior arrangements: yes       Family/Social Support:  Care provided by: self  Provides care for:  NA     Children          Description of Support System: Supportive, Involved         Current Resources:   Patient receiving home care services: No     Community Resources: None  Equipment currently used at home: walker, rolling  Supplies currently used at home:      Employment/Financial:  Employment Status:          Financial Concerns: No concerns identified           Lifestyle & Psychosocial Needs:  Lifestyle     Physical activity     Days per week: Not on file     Minutes per session: Not on file     Stress: Not at all     Social Needs     Financial resource strain: Not hard at all     Food insecurity     Worry: Never true     Inability: Never true     Transportation needs     Medical: No     Non-medical: No     Socioeconomic History     Marital status:      Spouse name: Not on file     Number of children: Not on file     Years of education: Not on file     Highest education level: Not on file   Relationships     Social connections     Talks on phone: More than three times a week     Gets  together: Not on file     Attends Gnosticist service: Not on file     Active member of club or organization: Not on file     Attends meetings of clubs or organizations: Not on file     Relationship status: Not on file     Intimate partner violence     Fear of current or ex partner: No     Emotionally abused: No     Physically abused: No     Forced sexual activity: No     Tobacco Use     Smoking status: Former Smoker     Types: Cigarettes     Quit date: 2/3/1996     Years since quittin.1     Smokeless tobacco: Never Used   Substance and Sexual Activity     Alcohol use: No     Alcohol/week: 0.0 standard drinks     Drug use: No     Sexual activity: Not Currently     Partners: Male       Functional Status:  Prior to admission patient needed assistance:              Mental Health Status:  Mental Health Status: No Current Concerns       Chemical Dependency Status:  Chemical Dependency Status: No Current Concerns             Values/Beliefs:  Spiritual, Cultural Beliefs, Taoism Practices, Values that affect care:                 Additional Information:  Met with pt who reports she lives alone in independent living.  She only receives cleaning services.  She has three children, two of them in the area and who are supportive and involved.  She uses a walker, denies any falls in the last six months.  One previous TCU visit at [facility].  Her son Drew will provide transportation at time of discharge. SW will continue to follow and assist for any discharge needs.     Gloria HERNANDEZ, [facility]  Inpatient Care Coordination   [facility]   [phone]

## 2021-03-27 NOTE — PLAN OF CARE
Pt A&Ox4. Up with SBA, walker. Denied pain. Lungs CTA. Dyspnea with exertion. Encouraging use of IS. BMX2 today, soft-loose. Tolerating diet. VSS, RA. Pt had echo and CT chest done today. US abdomen is ordered.

## 2021-03-27 NOTE — PROGRESS NOTES
03/27/21 1344   Quick Adds   Type of Visit Initial PT Evaluation   Living Environment   People in home alone   Current Living Arrangements independent living facility   Home Accessibility no concerns   Transportation Anticipated family or friend will provide   Living Environment Comments Patient lives in an ILF does not have any stairs.    Self-Care   Usual Activity Tolerance moderate   Current Activity Tolerance fair   Regular Exercise No   Equipment Currently Used at Home walker, rolling   Activity/Exercise/Self-Care Comment Patient does her own cooking, has a  every other week.    Disability/Function   Fall history within last six months no   Change in Functional Status Since Onset of Current Illness/Injury yes   General Information   Onset of Illness/Injury or Date of Surgery 03/26/21   Referring Physician Fito Carson MD   Patient/Family Therapy Goals Statement (PT) none stated   Pertinent History of Current Problem (include personal factors and/or comorbidities that impact the POC) Pt is a  88 year old female who has a history of CLL, CHF, cardiomyopathy, bladder cancer, essential hypertension, hypothyroidism, COPD MI among others, she presents with worsening shortness of breath. Found to have Acute hypoxemic respiratory failure in the setting of COPD with acute exacerbation.    Existing Precautions/Restrictions oxygen therapy device and L/min;fall  (0.5 l/min)   Cognition   Orientation Status (Cognition) person;place;situation   Affect/Mental Status (Cognition) WNL   Follows Commands (Cognition) WNL   Cognitive Status Comments Patient able to provide detailed history, no safety issues noted   Pain Assessment   Patient Currently in Pain No   Integumentary/Edema   Integumentary/Edema Comments Patient with LLE bruising and wound   Posture    Posture Kyphosis   Range of Motion (ROM)   ROM Comment WFL as seen per transfers   Strength Comprehensive (MMT)   Comment, General Manual Muscle Testing  (MMT) Assessment Tested sitting EOB: hip flexors L 3/5 R 3+/5, knee extensor L 4-/5 R4-/5    Bed Mobility   Comment (Bed Mobility) supine to sit SBA   Transfers   Transfer Safety Comments sit to stand CGA with 4WW   Gait/Stairs (Locomotion)   Comment (Gait/Stairs) Patient walking 10' CGA with 4WW. Pt with narrow PRABHAKAR and small step length   Balance   Balance Comments no LOB noted with mobility   Clinical Impression   Criteria for Skilled Therapeutic Intervention yes, treatment indicated   PT Diagnosis (PT) impaired functional mobility   Influenced by the following impairments weakness, breathing, fatigue   Functional limitations due to impairments decreased independence with bed mobility, transfers and ambulation   Clinical Presentation Stable/Uncomplicated   Clinical Presentation Rationale per clinical judgement   Clinical Decision Making (Complexity) low complexity   Therapy Frequency (PT) Daily   Predicted Duration of Therapy Intervention (days/wks) 3 days   Planned Therapy Interventions (PT) balance training;bed mobility training;home exercise program;gait training;neuromuscular re-education;patient/family education;ROM (range of motion);strengthening;transfer training   Risk & Benefits of therapy have been explained evaluation/treatment results reviewed;care plan/treatment goals reviewed;participants included;patient   Clinical Impression Comments Patient needing reminders for using brakes on 4WW for transfers   PT Discharge Planning    PT Discharge Recommendation (DC Rec) home with home care physical therapy   PT Rationale for DC Rec Patient is slightly below baseline in terms of activity tolerance due to her respiratory status. Patient would benefit from skilled acute therapy as well as home PT to improve activity tolerance, increase functional strength and improve safety with all mobility. Home PT as leaving the house would be taxing Ax1   PT Brief overview of current status  SBA supine<>sit, CGA sit to stand  with 2WW, CGA-SBA ambulation 120'x2 on 0.5 L of oxygen   Total Evaluation Time   Total Evaluation Time (Minutes) 8

## 2021-03-28 ENCOUNTER — APPOINTMENT (OUTPATIENT)
Dept: ULTRASOUND IMAGING | Facility: CLINIC | Age: 86
DRG: 190 | End: 2021-03-28
Attending: INTERNAL MEDICINE
Payer: MEDICARE

## 2021-03-28 ENCOUNTER — APPOINTMENT (OUTPATIENT)
Dept: PHYSICAL THERAPY | Facility: CLINIC | Age: 86
DRG: 190 | End: 2021-03-28
Payer: MEDICARE

## 2021-03-28 LAB
ANION GAP SERPL CALCULATED.3IONS-SCNC: 7 MMOL/L (ref 3–14)
ANISOCYTOSIS BLD QL SMEAR: SLIGHT
BASOPHILS # BLD AUTO: 0 10E9/L (ref 0–0.2)
BASOPHILS NFR BLD AUTO: 0 %
BUN SERPL-MCNC: 24 MG/DL (ref 7–30)
CALCIUM SERPL-MCNC: 9 MG/DL (ref 8.5–10.1)
CHLORIDE SERPL-SCNC: 98 MMOL/L (ref 94–109)
CO2 SERPL-SCNC: 30 MMOL/L (ref 20–32)
CREAT SERPL-MCNC: 0.71 MG/DL (ref 0.52–1.04)
DIFFERENTIAL METHOD BLD: ABNORMAL
EOSINOPHIL # BLD AUTO: 0 10E9/L (ref 0–0.7)
EOSINOPHIL NFR BLD AUTO: 0 %
ERYTHROCYTE [DISTWIDTH] IN BLOOD BY AUTOMATED COUNT: 15.8 % (ref 10–15)
GFR SERPL CREATININE-BSD FRML MDRD: 75 ML/MIN/{1.73_M2}
GLUCOSE SERPL-MCNC: 135 MG/DL (ref 70–99)
GRAM STN SPEC: NORMAL
HCT VFR BLD AUTO: 33.7 % (ref 35–47)
HGB BLD-MCNC: 10.3 G/DL (ref 11.7–15.7)
LYMPHOCYTES # BLD AUTO: 94.3 10E9/L (ref 0.8–5.3)
LYMPHOCYTES NFR BLD AUTO: 91 %
Lab: NORMAL
MCH RBC QN AUTO: 30.2 PG (ref 26.5–33)
MCHC RBC AUTO-ENTMCNC: 30.6 G/DL (ref 31.5–36.5)
MCV RBC AUTO: 99 FL (ref 78–100)
MONOCYTES # BLD AUTO: 0 10E9/L (ref 0–1.3)
MONOCYTES NFR BLD AUTO: 0 %
NEUTROPHILS # BLD AUTO: 9.3 10E9/L (ref 1.6–8.3)
NEUTROPHILS NFR BLD AUTO: 9 %
OVALOCYTES BLD QL SMEAR: SLIGHT
PLATELET # BLD AUTO: 84 10E9/L (ref 150–450)
PLATELET # BLD EST: ABNORMAL 10*3/UL
POIKILOCYTOSIS BLD QL SMEAR: SLIGHT
POTASSIUM SERPL-SCNC: 4.2 MMOL/L (ref 3.4–5.3)
RBC # BLD AUTO: 3.41 10E12/L (ref 3.8–5.2)
SODIUM SERPL-SCNC: 134 MMOL/L (ref 133–144)
SPECIMEN SOURCE: NORMAL
WBC # BLD AUTO: 103.6 10E9/L (ref 4–11)

## 2021-03-28 PROCEDURE — 87077 CULTURE AEROBIC IDENTIFY: CPT | Performed by: INTERNAL MEDICINE

## 2021-03-28 PROCEDURE — 94640 AIRWAY INHALATION TREATMENT: CPT | Mod: 76

## 2021-03-28 PROCEDURE — 999N000157 HC STATISTIC RCP TIME EA 10 MIN

## 2021-03-28 PROCEDURE — 250N000013 HC RX MED GY IP 250 OP 250 PS 637: Performed by: HOSPITALIST

## 2021-03-28 PROCEDURE — 87186 SC STD MICRODIL/AGAR DIL: CPT | Performed by: INTERNAL MEDICINE

## 2021-03-28 PROCEDURE — 87205 SMEAR GRAM STAIN: CPT | Performed by: INTERNAL MEDICINE

## 2021-03-28 PROCEDURE — 250N000012 HC RX MED GY IP 250 OP 636 PS 637: Performed by: INTERNAL MEDICINE

## 2021-03-28 PROCEDURE — 87070 CULTURE OTHR SPECIMN AEROBIC: CPT | Performed by: INTERNAL MEDICINE

## 2021-03-28 PROCEDURE — 250N000013 HC RX MED GY IP 250 OP 250 PS 637: Performed by: INTERNAL MEDICINE

## 2021-03-28 PROCEDURE — 97110 THERAPEUTIC EXERCISES: CPT | Mod: GP | Performed by: PHYSICAL THERAPIST

## 2021-03-28 PROCEDURE — 97116 GAIT TRAINING THERAPY: CPT | Mod: GP | Performed by: PHYSICAL THERAPIST

## 2021-03-28 PROCEDURE — 80048 BASIC METABOLIC PNL TOTAL CA: CPT | Performed by: HOSPITALIST

## 2021-03-28 PROCEDURE — 94640 AIRWAY INHALATION TREATMENT: CPT

## 2021-03-28 PROCEDURE — 76700 US EXAM ABDOM COMPLETE: CPT

## 2021-03-28 PROCEDURE — 97530 THERAPEUTIC ACTIVITIES: CPT | Mod: GP | Performed by: PHYSICAL THERAPIST

## 2021-03-28 PROCEDURE — 120N000001 HC R&B MED SURG/OB

## 2021-03-28 PROCEDURE — 99233 SBSQ HOSP IP/OBS HIGH 50: CPT | Performed by: INTERNAL MEDICINE

## 2021-03-28 PROCEDURE — 85025 COMPLETE CBC W/AUTO DIFF WBC: CPT | Performed by: HOSPITALIST

## 2021-03-28 PROCEDURE — 36415 COLL VENOUS BLD VENIPUNCTURE: CPT | Performed by: HOSPITALIST

## 2021-03-28 PROCEDURE — 250N000009 HC RX 250: Performed by: HOSPITALIST

## 2021-03-28 RX ORDER — AZITHROMYCIN 250 MG/1
500 TABLET, FILM COATED ORAL ONCE
Status: COMPLETED | OUTPATIENT
Start: 2021-03-28 | End: 2021-03-28

## 2021-03-28 RX ORDER — AZITHROMYCIN 250 MG/1
250 TABLET, FILM COATED ORAL DAILY
Status: DISCONTINUED | OUTPATIENT
Start: 2021-03-29 | End: 2021-03-30 | Stop reason: HOSPADM

## 2021-03-28 RX ORDER — PREDNISONE 20 MG/1
40 TABLET ORAL DAILY
Status: DISCONTINUED | OUTPATIENT
Start: 2021-03-28 | End: 2021-03-30 | Stop reason: HOSPADM

## 2021-03-28 RX ADMIN — ASPIRIN 81 MG: 81 TABLET ORAL at 10:37

## 2021-03-28 RX ADMIN — TRAZODONE HYDROCHLORIDE 50 MG: 50 TABLET ORAL at 22:21

## 2021-03-28 RX ADMIN — AZITHROMYCIN MONOHYDRATE 500 MG: 250 TABLET ORAL at 11:52

## 2021-03-28 RX ADMIN — IPRATROPIUM BROMIDE AND ALBUTEROL SULFATE 3 ML: .5; 3 SOLUTION RESPIRATORY (INHALATION) at 16:06

## 2021-03-28 RX ADMIN — LEVOTHYROXINE SODIUM 75 MCG: 0.07 TABLET ORAL at 20:37

## 2021-03-28 RX ADMIN — ATORVASTATIN CALCIUM 20 MG: 20 TABLET, FILM COATED ORAL at 10:29

## 2021-03-28 RX ADMIN — IPRATROPIUM BROMIDE AND ALBUTEROL SULFATE 3 ML: .5; 3 SOLUTION RESPIRATORY (INHALATION) at 11:36

## 2021-03-28 RX ADMIN — FERROUS SULFATE TAB 325 MG (65 MG ELEMENTAL FE) 325 MG: 325 (65 FE) TAB at 10:29

## 2021-03-28 RX ADMIN — SERTRALINE HYDROCHLORIDE 50 MG: 50 TABLET ORAL at 20:37

## 2021-03-28 RX ADMIN — IPRATROPIUM BROMIDE AND ALBUTEROL SULFATE 3 ML: .5; 3 SOLUTION RESPIRATORY (INHALATION) at 07:19

## 2021-03-28 RX ADMIN — IPRATROPIUM BROMIDE AND ALBUTEROL SULFATE 3 ML: .5; 3 SOLUTION RESPIRATORY (INHALATION) at 20:19

## 2021-03-28 RX ADMIN — PREDNISONE 40 MG: 20 TABLET ORAL at 11:52

## 2021-03-28 RX ADMIN — FUROSEMIDE 20 MG: 20 TABLET ORAL at 10:29

## 2021-03-28 ASSESSMENT — ACTIVITIES OF DAILY LIVING (ADL)
ADLS_ACUITY_SCORE: 14

## 2021-03-28 NOTE — PLAN OF CARE
Pt A&Ox4. Up with SBA, walker. Denied pain. Lungs coarse, crackles. Productive congested cough. Sent sputum to lab. Dyspnea with exertion. Satas drop to 83% without oxygen. Currently requiring 0.5 L O2. Wean as able. Started Z pack and PO prednisone. Using IS. Voiding. BM X1. Tolerating regular diet, saline locked.  VSS

## 2021-03-28 NOTE — PLAN OF CARE
7PM-7AM RN     Patient vital signs are at baseline: No. Still requires 1L/NC, although saturation at 97%.   Patient able to ambulate as they were prior to admission or with assist devices provided by therapies during their stay: Yes    Patient MUST void prior to discharge:  Yes  Patient able to tolerate oral intake:  Yes  Pain has adequate pain control using Oral analgesics:  Yes    Up with Ax1 w/walker and O2. States she has O2 at home, but does not currently use. Currently on 1L/NC. Productive cough. NPO since midnight in prep for abd ultrasound. Denied any pain.

## 2021-03-28 NOTE — PLAN OF CARE
OT: Multiple attempts made: 1st attempt pt out of room at scan, 2nd attempt requesting to eat breakfast; 3rd attempt, politely declining activity today, requesting to reschedule OT session.

## 2021-03-28 NOTE — PLAN OF CARE
Vital signs stable, on O2 at 1 lpm nasal cannula , sats 90's.  Lungs diminished,  dyspnea with activity, occasional non productive cough.  Ambulated with walker, gait belt and O2  with assist of 1.  Skin carleen.pt can be anxious at times.  On iv solumedrol.  Care plan reviewed with pt.

## 2021-03-28 NOTE — PROGRESS NOTES
"Care Management Follow Up    Length of Stay (days): 2    Expected Discharge Date: 03/29/21(/[facility])     Concerns to be Addressed: NA      Patient plan of care discussed at interdisciplinary rounds: Yes    Anticipated Discharge Disposition:  Home with HC services PT/OT/RN     Anticipated Discharge Services:  PT/OT/RN  Anticipated Discharge DME: unknown      Education Provided on the Discharge Plan:  yes  Patient/Family in Agreement with the Plan:  yes    Private pay costs discussed: Not applicable    Additional Information:  Chart reviewed. PT/OT recs for HC.  Met with pt to discuss recommendations and she was in agreement with HC.  She requested [facility].  Referral made for PT/OT/RN services.       Gloria HERNANDEZ, [facility]  Inpatient Care Coordination   [facility]   [phone]    Addendum-heard back from HC \"We are unable to see patient until at least 4/2 so when orders are placed they would need to state  that as a SOC date if MD is okay with that. I do not see that orders are placed yet so something to pass on to the team following her\"        "

## 2021-03-28 NOTE — PROGRESS NOTES
[facility]    Medicine Progress Note - Hospitalist Service       Date of Admission:  3/26/2021  Assessment & Plan                Marie Kline is a 88 year old female who has a history of CLL, CHF, cardiomyopathy, bladder cancer, essential hypertension, hypothyroidism, COPD MI among others, she presents with worsening shortness of breath.  She has been sick for 48 hours.  She is not oxygen dependent, at least she did not use oxygen in the last 1 year until this episode that he was in the last day having low oxygen at home.  She has been using 2 L/min to keep her saturation close to 90%.  She documented oxygen dropped to mid 80s specifically 84% on exertion.  She has reported some fever up to 101-102.  She has been using her home inhalers frequently.  She came to the emergency department for assessment.  Marie Kline is a 88 year old female who has CLL and COPD who presents with worsening shortness of breath and hypoxemia.    1.  Acute hypoxemic respiratory failure in the setting of COPD with acute exacerbation.  Unclear trigger event, could be multifactorial with a viral superimposed infection  2.  History of COPD not oxygen dependent.  3.  Chronic lymphocytic leukemia.  -Follow-up with oncology, ER doctor consulted oncologist over the phone, they do not plan to do any different treatment or intervention.  4.  Essential hypertension  On metoprolol and furosemide.  5.  Hypothyroidism.    Thyroid supplement.  6.  History of congestive heart failure.  With current NT proBNP elevation some component of diastolic dysfunction is not totally excluded.  On metoprolol, furosemide, aspirin, atorvastatin.    Plan.  Inpatient   Strict KATIE's.  Daily weight  Telemetry   O2 per nasal cannula as needed to keep oxygen saturation 92% or higher.  Prednisone 40 mg q day.  Albuterol as needed  DuoNeb nebulization every 4 hours.  Lasix 20 mg 1 tablet daily  Metoprolol ER 25 mg daily  Atorvastatin 20 mg 1 tablet  daily  Obtain echocardiogram which showed nml EF.    CT chest  Shows a malignant nodule or soft tissue abnormality with the R hilum and R hilar and mediastinal LAD. Consider bronch and or PET/CT scan. Pt aware of these findings.   Will start PO z pack for bronchitis.             Diet: Combination Diet Low Saturated Fat Na <2400mg Diet, No Caffeine Diet  Snacks/Supplements Adult: Boost Plus; Between Meals    DVT Prophylaxis: Pneumatic Compression Devices  Rees Catheter: in place, indication:    Code Status: No CPR- Do NOT Intubate           Disposition Plan   Expected discharge: Tomorrow, recommended to prior living arrangement once O2 use less than 1 liters/minute.  Entered: Rangel Alvarez MD 03/28/2021, 10:21 AM       The patient's care was discussed with the Bedside Nurse and Patient.    Rangel Alvarez MD  Hospitalist Service  [facility]  Contact information available via [facility] Paging/Directory    ______________________________________________________________________    Interval History     + cough with yellow sputum. No fevers/chills.    Data reviewed today: I reviewed all medications, new labs and imaging results over the last 24 hours. I personally reviewed no images or EKG's today.    Physical Exam   Vital Signs: Temp: 98.2  F (36.8  C) Temp src: Temporal BP: (!) 146/44 Pulse: 75   Resp: 20 SpO2: 97 % O2 Device: Nasal cannula Oxygen Delivery: 1/2 LPM  Weight: 84 lbs 4.8 oz    Gen - AAO x 3, frail and chronically ill appearing in NAD.  Lungs - diminished BS.  Heart - distant HS, RR,S1+S2 nml, no m/g/r.  Abd - soft, + ttp RUQ, ND, + BS.  Ext - no edema.    Data   Recent Labs   Lab 03/28/21  0744 03/27/21  1122 03/26/21 2018   .6*  --  144.9*   HGB 10.3*  --  11.2*   MCV 99  --  95   PLT 84*  --  102*    134 132*   POTASSIUM 4.2 4.0 3.9   CHLORIDE 98 95 95   CO2 30 31 31   BUN 24 19 15   CR 0.71 0.76 0.92   ANIONGAP 7 8 6   CARLOS 9.0 9.3 8.8   * 127* 140*   ALBUMIN   --  3.2*  --    PROTTOTAL  --  6.9  --    BILITOTAL  --  0.6  --    ALKPHOS  --  136  --    ALT  --  20  --    AST  --  24  --    TROPI  --   --  <0.015     Recent Results (from the past 24 hour(s))   Echocardiogram Complete    Narrative    [id number]  [id number]  [id number]  [id number]^KENNETH^VIC^NANCY     [facility]  Echocardiography Laboratory  [address] [place] [address]     Name: MAGGI RODRIGUEZ  MRN: [id number]  : 1932  Study Date: 2021 12:54 PM  Age: 88 yrs  Gender: Female  Patient Location: [facility]  Reason For Study: Heart Failure  Ordering Physician: VIC COOPER  Referring Physician: Piper Kiser MD  Performed By: Stacey Lopez RDCS     BSA: 1.3 m2  Height: 59 in  Weight: 84 lb  HR: 84  BP: 115/49 mmHg  ______________________________________________________________________________  Procedure  Complete Portable Echo Adult.  ______________________________________________________________________________  Interpretation Summary     The left ventricle is normal in size. There is normal left ventricular wall  thickness. Left ventricular systolic function is normal. The visual ejection  fraction is estimated at 60-65%. Diastolic Doppler findings (E/E' ratio and/or  other parameters) suggest left ventricular filling pressures are increased. No  regional wall motion abnormalities noted.  The right ventricle is normal size. The right ventricular systolic function is  normal.  Moderate left atrial enlargement.  Mild mitral and tricuspid regurgitation.  No pericardial effusion.  In direct comparison to the previous study dated 2018, there has been a  significant interval improvement in left ventricular systolic function.  ______________________________________________________________________________  Left Ventricle  The left ventricle is normal in size. There is normal left ventricular wall  thickness. Left ventricular systolic function is normal. The visual  ejection  fraction is estimated at 60-65%. Diastolic Doppler findings (E/E' ratio and/or  other parameters) suggest left ventricular filling pressures are increased. No  regional wall motion abnormalities noted.     Right Ventricle  The right ventricle is normal size. The right ventricular systolic function is  normal.     Atria  The left atrium is moderately dilated. Right atrial size is normal. There is  no color Doppler evidence of an atrial shunt.     Mitral Valve  The mitral valve leaflets are mildly thickened. There is moderate mitral  annular calcification. There is mild (1+) mitral regurgitation.     Tricuspid Valve  There is mild (1+) tricuspid regurgitation. The right ventricular systolic  pressure is approximated at 39.4 mmHg plus the right atrial pressure. IVC  diameter and respiratory changes fall into an intermediate range suggesting an  RA pressure of 8 mmHg. Right ventricular systolic pressure is elevated,  consistent with mild to moderate pulmonary hypertension.     Aortic Valve  There is mild trileaflet aortic sclerosis. There is mild (1+) aortic  regurgitation. Transaortic gradients are mildly increased consistent with  aortic sclerosis.     Pulmonic Valve  There is mild (1+) pulmonic valvular regurgitation. There is no pulmonic  valvular stenosis.     Vessels  The aortic root is normal size. Normal size ascending aorta. Dilation of the  inferior vena cava is present with normal respiratory variation in diameter.     Pericardium  There is no pericardial effusion.     Rhythm  Sinus rhythm was noted.  ______________________________________________________________________________  MMode/2D Measurements & Calculations  IVSd: 0.95 cm     LVIDd: 3.9 cm  LVIDs: 1.8 cm  LVPWd: 0.80 cm  FS: 52.7 %  LV mass(C)d: 101.4 grams  LV mass(C)dI: 79.5 grams/m2  Ao root diam: 2.9 cm  LA dimension: 3.9 cm  asc Aorta Diam: 3.0 cm  LA/Ao: 1.3  LVOT diam: 1.8 cm  LVOT area: 2.5 cm2  RWT: 0.41     Doppler Measurements &  Calculations  MV E max eusebio: 85.4 cm/sec  MV A max eusebio: 103.0 cm/sec  MV E/A: 0.83  MV max P.0 mmHg  MV mean P.0 mmHg  MV V2 VTI: 31.1 cm  MV P1/2t max eusebio: 132.0 cm/sec  MV P1/2t: 67.2 msec  MVA(P1/2t): 3.3 cm2  MV dec slope: 575.0 cm/sec2  MV dec time: 0.16 sec  Ao V2 max: 164.0 cm/sec  Ao max P.0 mmHg  AI P1/2t: 393.4 msec  PA acc time: 0.11 sec  TR max eusebio: 314.0 cm/sec  TR max P.4 mmHg     E/E' av.2  Lateral E/e': 10.3  Medial E/e': 14.1     ______________________________________________________________________________  Report approved by: Marcela Morales 2021 02:04 PM         CT Chest w/o Contrast    Narrative    CT CHEST WITHOUT CONTRAST 2021 3:04 PM    CLINICAL HISTORY: Abnormal x-ray - lung nodule, >= 1 cm. Cough, fever,  lung nodule.    TECHNIQUE: CT chest without IV contrast. Multiplanar reformats were  obtained. Dose reduction techniques were used.  CONTRAST: None.    COMPARISON: Chest x-ray 3/26/2021, chest CT 2018.    FINDINGS:   LUNGS AND PLEURA: Lungs are emphysematous. There is abnormal nodular  airspace consolidation in the medial right upper lobe and in the  perihilar right upper lobe. The more medial nodular opacity measures  approximately 1.9 x 1.3 cm (series 4, image 89). This soft tissue  opacity adjacent to the lateral margin of the right pulmonary hilum  measures approximately 1.6 x 2.0 cm. Precise measurements difficult  due to lack of intravenous contrast. Biapical pleural-based scarring  is present. There are minor areas of mucous plugging at the lung  bases. No pleural effusion.    MEDIASTINUM/AXILLAE: There appears to be right hilar and mediastinal  adenopathy, again difficult to visualize given lack of intravenous  contrast. A precarinal lymph node measures 2.0 x 2.7 cm (series 2,  image 24). No definite left hilar or axillary adenopathy. There are  mild coronary calcifications. Thoracic aortic caliber within normal  limits.    UPPER ABDOMEN:  The spleen appears enlarged. Nonobstructing  calcification in the right renal collecting system measures up to 5  mm.    MUSCULOSKELETAL: No destructive bone lesions.      Impression    IMPRESSION:   1.  Malignant appearing nodule or soft tissue abnormalities associated  with the right pulmonary hilum and in the medial right upper lobe  associated with right hilar and mediastinal adenopathy. Imaging  limited without intravenous contrast. Consider PET/CT and/or  bronchoscopy for further evaluation if a contrast-enhanced study  cannot be obtained.  2.  Emphysematous lungs.  3.  Splenomegaly.  4.  Right nephrolithiasis.    PADMINI MOULTON MD   US Abdomen Complete    Narrative    US ABDOMEN COMPLETE 3/28/2021 9:25 AM    CLINICAL HISTORY: Fever, right upper quadrant pain.    TECHNIQUE: Complete abdominal ultrasound.    COMPARISON: CT 9/17/2018.    FINDINGS:    GALLBLADDER: Normal. No gallstones, wall thickening, or  pericholecystic fluid. Negative sonographic De La Torre's sign.    BILE DUCTS: No biliary dilatation. The common duct measures 9 mm.    LIVER: Normal parenchyma with smooth contour. No focal mass.    RIGHT KIDNEY: Normal size. Normal echogenicity with no hydronephrosis  or mass.     LEFT KIDNEY: Normal size. Normal echogenicity with no hydronephrosis  or mass.     SPLEEN: Enlarged, measuring 18.2 cm in length. There are two splenic  lesions both relatively hypoechoic to the spleen that measure 3.0 and  1.4 cm respectively.    PANCREAS: The visualized portions are normal.    AORTA: Normal in caliber.     IVC: Normal where visualized.    No ascites.      Impression    IMPRESSION:  1.  Splenomegaly, demonstrated on prior CT scans. Nonspecific splenic  lesions also appear to have been present on prior CT scans, possibly  small hemangiomas or complex cysts.  2.  No acute cause for pain demonstrated.    PADMINI MOULTON MD     Medications       aspirin  81 mg Oral Daily     atorvastatin  20 mg Oral Daily     ferrous  sulfate  325 mg Oral Daily with breakfast     fluticasone  1 spray Both Nostrils Daily     furosemide  20 mg Oral Daily     ipratropium - albuterol 0.5 mg/2.5 mg/3 mL  3 mL Nebulization 4x daily     levothyroxine  75 mcg Oral QPM     metoprolol succinate ER  25 mg Oral Daily     predniSONE  40 mg Oral Daily     sertraline  50 mg Oral QPM     sodium chloride (PF)  3 mL Intracatheter Q8H     traZODone  50 mg Oral At Bedtime

## 2021-03-29 ENCOUNTER — APPOINTMENT (OUTPATIENT)
Dept: PHYSICAL THERAPY | Facility: CLINIC | Age: 86
DRG: 190 | End: 2021-03-29
Payer: MEDICARE

## 2021-03-29 ENCOUNTER — APPOINTMENT (OUTPATIENT)
Dept: OCCUPATIONAL THERAPY | Facility: CLINIC | Age: 86
DRG: 190 | End: 2021-03-29
Payer: MEDICARE

## 2021-03-29 LAB
ANION GAP SERPL CALCULATED.3IONS-SCNC: 6 MMOL/L (ref 3–14)
ANISOCYTOSIS BLD QL SMEAR: SLIGHT
BASOPHILS # BLD AUTO: 0 10E9/L (ref 0–0.2)
BASOPHILS NFR BLD AUTO: 0 %
BUN SERPL-MCNC: 27 MG/DL (ref 7–30)
CALCIUM SERPL-MCNC: 9 MG/DL (ref 8.5–10.1)
CHLORIDE SERPL-SCNC: 100 MMOL/L (ref 94–109)
CO2 SERPL-SCNC: 33 MMOL/L (ref 20–32)
CREAT SERPL-MCNC: 0.71 MG/DL (ref 0.52–1.04)
DIFFERENTIAL METHOD BLD: ABNORMAL
EOSINOPHIL # BLD AUTO: 0 10E9/L (ref 0–0.7)
EOSINOPHIL NFR BLD AUTO: 0 %
ERYTHROCYTE [DISTWIDTH] IN BLOOD BY AUTOMATED COUNT: 15.8 % (ref 10–15)
GFR SERPL CREATININE-BSD FRML MDRD: 75 ML/MIN/{1.73_M2}
GLUCOSE SERPL-MCNC: 94 MG/DL (ref 70–99)
HCT VFR BLD AUTO: 30.6 % (ref 35–47)
HGB BLD-MCNC: 9.3 G/DL (ref 11.7–15.7)
LYMPHOCYTES # BLD AUTO: 75.1 10E9/L (ref 0.8–5.3)
LYMPHOCYTES NFR BLD AUTO: 96 %
MCH RBC QN AUTO: 30 PG (ref 26.5–33)
MCHC RBC AUTO-ENTMCNC: 30.4 G/DL (ref 31.5–36.5)
MCV RBC AUTO: 99 FL (ref 78–100)
MONOCYTES # BLD AUTO: 2.3 10E9/L (ref 0–1.3)
MONOCYTES NFR BLD AUTO: 3 %
NEUTROPHILS # BLD AUTO: 0.8 10E9/L (ref 1.6–8.3)
NEUTROPHILS NFR BLD AUTO: 1 %
OVALOCYTES BLD QL SMEAR: SLIGHT
PLATELET # BLD AUTO: 75 10E9/L (ref 150–450)
PLATELET # BLD EST: ABNORMAL 10*3/UL
POTASSIUM SERPL-SCNC: 3.2 MMOL/L (ref 3.4–5.3)
POTASSIUM SERPL-SCNC: 5.1 MMOL/L (ref 3.4–5.3)
RBC # BLD AUTO: 3.1 10E12/L (ref 3.8–5.2)
SODIUM SERPL-SCNC: 139 MMOL/L (ref 133–144)
WBC # BLD AUTO: 78.2 10E9/L (ref 4–11)

## 2021-03-29 PROCEDURE — 999N000157 HC STATISTIC RCP TIME EA 10 MIN

## 2021-03-29 PROCEDURE — 84132 ASSAY OF SERUM POTASSIUM: CPT | Performed by: INTERNAL MEDICINE

## 2021-03-29 PROCEDURE — 99233 SBSQ HOSP IP/OBS HIGH 50: CPT | Performed by: INTERNAL MEDICINE

## 2021-03-29 PROCEDURE — 250N000013 HC RX MED GY IP 250 OP 250 PS 637: Performed by: INTERNAL MEDICINE

## 2021-03-29 PROCEDURE — 250N000013 HC RX MED GY IP 250 OP 250 PS 637: Performed by: HOSPITALIST

## 2021-03-29 PROCEDURE — 250N000012 HC RX MED GY IP 250 OP 636 PS 637: Performed by: INTERNAL MEDICINE

## 2021-03-29 PROCEDURE — 120N000001 HC R&B MED SURG/OB

## 2021-03-29 PROCEDURE — 97530 THERAPEUTIC ACTIVITIES: CPT | Mod: GP | Performed by: PHYSICAL THERAPIST

## 2021-03-29 PROCEDURE — 36415 COLL VENOUS BLD VENIPUNCTURE: CPT | Performed by: INTERNAL MEDICINE

## 2021-03-29 PROCEDURE — 97535 SELF CARE MNGMENT TRAINING: CPT | Mod: GO

## 2021-03-29 PROCEDURE — 85025 COMPLETE CBC W/AUTO DIFF WBC: CPT | Performed by: INTERNAL MEDICINE

## 2021-03-29 PROCEDURE — 94640 AIRWAY INHALATION TREATMENT: CPT

## 2021-03-29 PROCEDURE — 80048 BASIC METABOLIC PNL TOTAL CA: CPT | Performed by: INTERNAL MEDICINE

## 2021-03-29 PROCEDURE — 250N000009 HC RX 250: Performed by: HOSPITALIST

## 2021-03-29 PROCEDURE — 94640 AIRWAY INHALATION TREATMENT: CPT | Mod: 76

## 2021-03-29 RX ORDER — POTASSIUM CHLORIDE 1500 MG/1
20 TABLET, EXTENDED RELEASE ORAL ONCE
Status: COMPLETED | OUTPATIENT
Start: 2021-03-29 | End: 2021-03-29

## 2021-03-29 RX ADMIN — PREDNISONE 40 MG: 20 TABLET ORAL at 10:29

## 2021-03-29 RX ADMIN — FUROSEMIDE 20 MG: 20 TABLET ORAL at 10:29

## 2021-03-29 RX ADMIN — IPRATROPIUM BROMIDE AND ALBUTEROL SULFATE 3 ML: .5; 3 SOLUTION RESPIRATORY (INHALATION) at 07:50

## 2021-03-29 RX ADMIN — LEVOTHYROXINE SODIUM 75 MCG: 0.07 TABLET ORAL at 20:25

## 2021-03-29 RX ADMIN — TRAZODONE HYDROCHLORIDE 50 MG: 50 TABLET ORAL at 22:26

## 2021-03-29 RX ADMIN — ASPIRIN 81 MG: 81 TABLET ORAL at 10:28

## 2021-03-29 RX ADMIN — POTASSIUM CHLORIDE 20 MEQ: 1500 TABLET, EXTENDED RELEASE ORAL at 11:04

## 2021-03-29 RX ADMIN — ATORVASTATIN CALCIUM 20 MG: 20 TABLET, FILM COATED ORAL at 10:28

## 2021-03-29 RX ADMIN — SERTRALINE HYDROCHLORIDE 50 MG: 50 TABLET ORAL at 20:25

## 2021-03-29 RX ADMIN — AZITHROMYCIN MONOHYDRATE 250 MG: 250 TABLET ORAL at 10:28

## 2021-03-29 RX ADMIN — FERROUS SULFATE TAB 325 MG (65 MG ELEMENTAL FE) 325 MG: 325 (65 FE) TAB at 10:28

## 2021-03-29 RX ADMIN — IPRATROPIUM BROMIDE AND ALBUTEROL SULFATE 3 ML: .5; 3 SOLUTION RESPIRATORY (INHALATION) at 20:44

## 2021-03-29 RX ADMIN — IPRATROPIUM BROMIDE AND ALBUTEROL SULFATE 3 ML: .5; 3 SOLUTION RESPIRATORY (INHALATION) at 16:00

## 2021-03-29 ASSESSMENT — ACTIVITIES OF DAILY LIVING (ADL)
ADLS_ACUITY_SCORE: 14
ADLS_ACUITY_SCORE: 14
ADLS_ACUITY_SCORE: 16
ADLS_ACUITY_SCORE: 14
ADLS_ACUITY_SCORE: 16
ADLS_ACUITY_SCORE: 14

## 2021-03-29 ASSESSMENT — MIFFLIN-ST. JEOR: SCORE: 727.99

## 2021-03-29 NOTE — PROGRESS NOTES
"CLINICAL NUTRITION SERVICES - BRIEF NOTE     - Refer to RD assessment completed 3/27, could not connect with patient at that time.    - Updated malnutrition and nutrition/wt history:  - 12% wt loss over the past ~6 months.  - Patient reports her UBW prior to the loss was >/=100#.  She notes about 9 months ago she began losing her teeth and had all removed.  She was struggling with mouth pain/changing to a soft diet and feeling restricted.  Marie had full dentures fitted though they no longer fit given her weight loss.    - She makes all her own meals at home.  She resides at [facility] though does not go to their dining facility.  For breakfast she likes toast with peanut butter and butter - dipped in coffee to soften.  For lunch she has a Boost shake (homemade, mixed with ice cream) and usually soup.  For dinner she has \"something easy\", likes mac and cheese.  - Suspect meeting <75% needs orally at baseline given wt loss and to maintain low BMI.    - Change to high calorie/high protein diet.  Marie reports being able to self-select soft foods tolerated.  She is also able to verbalize how to make meals high calorie/high protein - we also brainstormed additional ideas for the home setting.    - She would like to receive chocolate boost shakes with lunch while admitted.    MALNUTRITION  % Weight Loss:  > 10% in 6 months (severe malnutrition)  % Intake:  </= 75% for >/= 1 month (severe malnutrition)  Subcutaneous Fat Loss:  Orbital region moderate depletion and Upper arm region severe depletion  Muscle Loss:  Temporal region moderate to severe depletion, Clavicle bone region severe depletion and Acromion bone region severe depletion  Fluid Retention:  None noted or documented    Malnutrition Diagnosis: Severe malnutrition  In Context of:  Acute illness or injury or chronic illness or disease    - Will continue following.      Casandra Hennessy, SHAWN, LD  Clinical Dietitian  3rd floor/ICU: [phone]  All other floors: " [phone]  Weekend/holiday: [phone]

## 2021-03-29 NOTE — PLAN OF CARE
Alert and oriented x 4, forgetful at times.  Vital signs stable.  Lungs diminished, occasional expiratory wheezes in upper lobes, dyspnea with adl .  Sats 90,% on 1/2 lpm nasal cannula. Desat on room air to 82%.  Occasional productive cough, yellow sputum.Denies pain.  Ambulated with gait belt, walker and O2 with sba of 1.  On po prednisone.  Skin carleen, bruised to legs and arms.  Care plan reviewed with pt.

## 2021-03-29 NOTE — PROGRESS NOTES
Called by Dr Alvarez about abnormal CT scan.      Reviewed showing emphysema, severe vascular calcifications with right hilar infiltrate/mass but also right hilar HECTOR and probably subcarinal HECTOR.  Patient has CLL with thrombocytopenia and is frail and is DNI.      A: possible pulmonary malignancy, although CLL could acount for the HECTOR although it is unilateral.      Tissue dx would need EBUS bronch which Dr Powell can do at [facility] so I will notify her.

## 2021-03-29 NOTE — PLAN OF CARE
Pt A&Ox4. Up with SBA, walker. Denied pain. Lungs diminished. Requiring 0.5L O2 with activity. Sats drop to 87% with activity. Productive cough, yellow sputum. Using IS. Regular diet. Voiding. VSS, RA

## 2021-03-29 NOTE — PROGRESS NOTES
Care Management Follow Up    Length of Stay (days): 3    Expected Discharge Date: 03/30/21     Concerns to be Addressed:    Discharge planning   Patient plan of care discussed at interdisciplinary rounds: Yes    Anticipated Discharge Disposition:   Home- [facility] with [facility] RN PT OT     Anticipated Discharge Services:  Home care, Home oxygen  Anticipated Discharge DME:  oxygen    Patient/family educated on Medicare website which has current facility and service quality ratings:  yes  Education Provided on the Discharge Plan:  yes  Patient/Family in Agreement with the Plan:  yes    Referrals Placed by CM/SW:  [facility]  Private pay costs discussed: Not applicable    Additional Information:  Patient has h/o CLL and COPD, admitted with acute resp failure. Patient is followed by Oncology, Dr. Ivory. Spoke with patient and daughter, Margaret, for discharge planning.  Patient resides at [facility]. Pt plans to discharge back to her [facility] with home care service. Referral was sent to [facility] for RN PT OT services.  Her daughter, Margaret, plans to stay with her when discharged.     Patient has home oxygen provided by [facility]. Patient portable concentrator at bedside. Verified with Jesi at [facility], home oxygen orders for 2L continous. Any changes in oxygen orders can be faxed to [facility] ph#[phone], fax# [phone].     [facility]. She will need orders for home care at discharge.     Daughter, Margaret, will provide transportation.     Request sent to [facility] for post hospital appointment scheduling.     Vaishnavi Garcias RN Case Manager  Inpatient Care Coordination   [facility]   [phone]        Vaishnavi Garcias RN

## 2021-03-29 NOTE — PLAN OF CARE
Pt A/O, afebrile. 1/2 L O2. Tele is normal SR. Denies pain. Cardiac diet. Up with assist of 1. Voiding. Will continue with POC

## 2021-03-29 NOTE — PROGRESS NOTES
[facility]    Medicine Progress Note - Hospitalist Service       Date of Admission:  3/26/2021  Assessment & Plan                  Marie Kline is a 88 year old female who has a history of CLL, CHF, cardiomyopathy, bladder cancer, essential hypertension, hypothyroidism, COPD MI among others, she presents with worsening shortness of breath.  She has been sick for 48 hours.  She is not oxygen dependent, at least she did not use oxygen in the last 1 year until this episode that he was in the last day having low oxygen at home.  She has been using 2 L/min to keep her saturation close to 90%.  She documented oxygen dropped to mid 80s specifically 84% on exertion.  She has reported some fever up to 101-102.  She has been using her home inhalers frequently.  She came to the emergency department for assessment.  Marie Kline is a 88 year old female who has CLL and COPD who presents with worsening shortness of breath and hypoxemia.    1.  Acute hypoxemic respiratory failure in the setting of COPD with acute exacerbation.  Unclear trigger event, could be multifactorial with a viral superimposed infection  2.  History of COPD not oxygen dependent.  3.  Chronic lymphocytic leukemia.  -Follow-up with oncology, ER doctor consulted oncologist over the phone, they do not plan to do any different treatment or intervention.  4.  Essential hypertension  On metoprolol and furosemide.  5.  Hypothyroidism.    Thyroid supplement.  6.  History of congestive heart failure.  With current NT proBNP elevation some component of diastolic dysfunction is not totally excluded.  On metoprolol, furosemide, aspirin, atorvastatin.    Plan.  Inpatient   Strict KATIE's.  Daily weight  Telemetry   O2 per nasal cannula as needed to keep oxygen saturation 92% or higher.  Prednisone 40 mg q day.  Albuterol as needed  DuoNeb nebulization every 4 hours.  Lasix 20 mg 1 tablet daily  Metoprolol ER 25 mg daily  Atorvastatin 20 mg 1 tablet  daily  Obtain echocardiogram which showed nml EF.    CT chest  Shows a malignant nodule or soft tissue abnormality with the R hilum and R hilar and mediastinal LAD. Consider bronch and or PET/CT scan. Pt aware of these findings.   Will start PO z pack for bronchitis.               Diet: Snacks/Supplements Adult: Boost Plus; Between Meals  High Kcal/High Protein Diet, ADULT  Snacks/Supplements Adult: Boost Shake; With Meals    DVT Prophylaxis: Pneumatic Compression Devices  Rees Catheter: in place, indication:    Code Status: No CPR- Do NOT Intubate           Disposition Plan   Expected discharge: Tomorrow, recommended to prior living arrangement once O2 use less than 2 liters/minute.  Entered: Rangel Alvarez MD 03/29/2021, 10:33 AM       The patient's care was discussed with the Bedside Nurse, Patient and Patient's Family.    Rangel Alvarez MD  Hospitalist Service  [facility]  Contact information available via [facility] Paging/Directory    ______________________________________________________________________    Interval History     + cough with yellow sputum. No fevers/chills.    Data reviewed today: I reviewed all medications, new labs and imaging results over the last 24 hours. I personally reviewed no images or EKG's today.    Physical Exam   Vital Signs: Temp: 98  F (36.7  C) Temp src: Temporal BP: 106/48 Pulse: 91   Resp: 15 SpO2: 94 % O2 Device: Nasal cannula Oxygen Delivery: 1.5 LPM  Weight: 86 lbs 8 oz    Gen - AAO x 3, frail and chronically ill appearing in NAD.  Lungs - diminished BS.  Heart - distant HS, RR,S1+S2 nml, no m/g/r.  Abd - soft, + ttp RUQ, ND, + BS.  Ext - no edema.    Data   Recent Labs   Lab 03/29/21  0733 03/28/21  0744 03/27/21  1122 03/26/21 2018   WBC 78.2* 103.6*  --  144.9*   HGB 9.3* 10.3*  --  11.2*   MCV 99 99  --  95   PLT 75* 84*  --  102*    134 134 132*   POTASSIUM 3.2* 4.2 4.0 3.9   CHLORIDE 100 98 95 95   CO2 33* 30 31 31   BUN 27 24 19 15   CR  0.71 0.71 0.76 0.92   ANIONGAP 6 7 8 6   CARLOS 9.0 9.0 9.3 8.8   GLC 94 135* 127* 140*   ALBUMIN  --   --  3.2*  --    PROTTOTAL  --   --  6.9  --    BILITOTAL  --   --  0.6  --    ALKPHOS  --   --  136  --    ALT  --   --  20  --    AST  --   --  24  --    TROPI  --   --   --  <0.015     No results found for this or any previous visit (from the past 24 hour(s)).  Medications       aspirin  81 mg Oral Daily     atorvastatin  20 mg Oral Daily     azithromycin  250 mg Oral Daily     ferrous sulfate  325 mg Oral Daily with breakfast     fluticasone  1 spray Both Nostrils Daily     furosemide  20 mg Oral Daily     ipratropium - albuterol 0.5 mg/2.5 mg/3 mL  3 mL Nebulization 4x daily     levothyroxine  75 mcg Oral QPM     metoprolol succinate ER  25 mg Oral Daily     potassium chloride  20 mEq Oral Once     predniSONE  40 mg Oral Daily     sertraline  50 mg Oral QPM     sodium chloride (PF)  3 mL Intracatheter Q8H     traZODone  50 mg Oral At Bedtime

## 2021-03-30 ENCOUNTER — TELEPHONE (OUTPATIENT)
Dept: INTERNAL MEDICINE | Facility: CLINIC | Age: 86
End: 2021-03-30

## 2021-03-30 ENCOUNTER — APPOINTMENT (OUTPATIENT)
Dept: OCCUPATIONAL THERAPY | Facility: CLINIC | Age: 86
DRG: 190 | End: 2021-03-30
Payer: MEDICARE

## 2021-03-30 ENCOUNTER — PATIENT OUTREACH (OUTPATIENT)
Dept: CARE COORDINATION | Facility: CLINIC | Age: 86
End: 2021-03-30

## 2021-03-30 VITALS
DIASTOLIC BLOOD PRESSURE: 63 MMHG | WEIGHT: 92.3 LBS | BODY MASS INDEX: 18.61 KG/M2 | HEIGHT: 59 IN | OXYGEN SATURATION: 94 % | TEMPERATURE: 98.2 F | HEART RATE: 70 BPM | RESPIRATION RATE: 18 BRPM | SYSTOLIC BLOOD PRESSURE: 131 MMHG

## 2021-03-30 DIAGNOSIS — R91.1 LUNG NODULE: Primary | ICD-10-CM

## 2021-03-30 LAB — POTASSIUM SERPL-SCNC: 3.6 MMOL/L (ref 3.4–5.3)

## 2021-03-30 PROCEDURE — 84132 ASSAY OF SERUM POTASSIUM: CPT | Performed by: INTERNAL MEDICINE

## 2021-03-30 PROCEDURE — 250N000013 HC RX MED GY IP 250 OP 250 PS 637: Performed by: HOSPITALIST

## 2021-03-30 PROCEDURE — 36415 COLL VENOUS BLD VENIPUNCTURE: CPT | Performed by: INTERNAL MEDICINE

## 2021-03-30 PROCEDURE — 97530 THERAPEUTIC ACTIVITIES: CPT | Mod: GO | Performed by: REHABILITATION PRACTITIONER

## 2021-03-30 PROCEDURE — 99239 HOSP IP/OBS DSCHRG MGMT >30: CPT | Performed by: INTERNAL MEDICINE

## 2021-03-30 PROCEDURE — 250N000012 HC RX MED GY IP 250 OP 636 PS 637: Performed by: INTERNAL MEDICINE

## 2021-03-30 PROCEDURE — 94640 AIRWAY INHALATION TREATMENT: CPT | Mod: 76

## 2021-03-30 PROCEDURE — 250N000013 HC RX MED GY IP 250 OP 250 PS 637: Performed by: INTERNAL MEDICINE

## 2021-03-30 PROCEDURE — 999N000157 HC STATISTIC RCP TIME EA 10 MIN

## 2021-03-30 PROCEDURE — 250N000009 HC RX 250: Performed by: HOSPITALIST

## 2021-03-30 PROCEDURE — 94640 AIRWAY INHALATION TREATMENT: CPT

## 2021-03-30 RX ORDER — PREDNISONE 10 MG/1
TABLET ORAL
Qty: 20 TABLET | Refills: 0 | Status: SHIPPED | OUTPATIENT
Start: 2021-03-30 | End: 2021-04-07

## 2021-03-30 RX ADMIN — IPRATROPIUM BROMIDE AND ALBUTEROL SULFATE 3 ML: .5; 3 SOLUTION RESPIRATORY (INHALATION) at 07:54

## 2021-03-30 RX ADMIN — PREDNISONE 40 MG: 20 TABLET ORAL at 09:11

## 2021-03-30 RX ADMIN — AZITHROMYCIN MONOHYDRATE 250 MG: 250 TABLET ORAL at 09:11

## 2021-03-30 RX ADMIN — ASPIRIN 81 MG: 81 TABLET ORAL at 09:11

## 2021-03-30 RX ADMIN — FERROUS SULFATE TAB 325 MG (65 MG ELEMENTAL FE) 325 MG: 325 (65 FE) TAB at 09:11

## 2021-03-30 RX ADMIN — METOPROLOL SUCCINATE 25 MG: 25 TABLET, FILM COATED, EXTENDED RELEASE ORAL at 09:11

## 2021-03-30 RX ADMIN — IPRATROPIUM BROMIDE AND ALBUTEROL SULFATE 3 ML: .5; 3 SOLUTION RESPIRATORY (INHALATION) at 11:13

## 2021-03-30 RX ADMIN — FUROSEMIDE 20 MG: 20 TABLET ORAL at 09:11

## 2021-03-30 RX ADMIN — ATORVASTATIN CALCIUM 20 MG: 20 TABLET, FILM COATED ORAL at 09:11

## 2021-03-30 RX ADMIN — ACETAMINOPHEN 500 MG: 500 TABLET, FILM COATED ORAL at 13:10

## 2021-03-30 ASSESSMENT — MIFFLIN-ST. JEOR: SCORE: 754.3

## 2021-03-30 ASSESSMENT — ACTIVITIES OF DAILY LIVING (ADL)
ADLS_ACUITY_SCORE: 16
ADLS_ACUITY_SCORE: 16
ADLS_ACUITY_SCORE: 21
ADLS_ACUITY_SCORE: 16

## 2021-03-30 NOTE — TELEPHONE ENCOUNTER
[facility] calling for verbal orders form home health care. SN and OT for evaluation and treatment. They can be reached at [phone]. Please advise. Thanks.

## 2021-03-30 NOTE — PLAN OF CARE
"Physical Therapy Discharge Summary    Reason for therapy discharge:    Discharged to home with home therapy.    Progress towards therapy goal(s). See goals on Care Plan in [facility] electronic health record for goal details.  Goals partially met.  Barriers to achieving goals:   discharge from facility.  Pt continues to desaturate with gait for 250' on RA.     Therapy recommendation(s):    Continued therapy is recommended.  Rationale/Recommendations:   .    Per chart review and note from treating therapist, \"Patient is slightly below baseline in terms of activity tolerance due to her respiratory status. Patient would benefit from skilled acute therapy as well as home PT to improve activity tolerance, increase functional strength and improve safety with all mobility. Home PT as leaving the house would be taxing Ax1  "

## 2021-03-30 NOTE — PLAN OF CARE
"/57 (BP Location: Right arm)   Pulse 83   Temp 98  F (36.7  C) (Oral)   Resp 18   Ht 1.499 m (4' 11\")   Wt 39.2 kg (86 lb 8 oz)   LMP  (LMP Unknown)   SpO2 93%   BMI 17.47 kg/m    Orientation: A&Ox4, forgetful  Resp: LS diminished, BENJAMIN, infrequent productive cough, 1.5L O2  Activity: SBA with walker/gb  Diet: tolerating regular   Voiding: spontaneously without difficulty  Discharge Plan: Home tomorrow with homecare and O2.     "

## 2021-03-30 NOTE — DISCHARGE INSTRUCTIONS
A referral for home care has been made to [facility]. They will call you 24-48 hours after your discharge.  If you don't receive a call from them please contact them at [phone].

## 2021-03-30 NOTE — PROGRESS NOTES
Clinic Care Coordination Contact  Ambulatory Care Coordination to Inpatient Care Management   Hand-In Communication    Date:  March 30, 2021  Name: Marie Kline is enrolled in Ambulatory Care Coordination program and I am the Lead Care Coordinator.  CC Contact Information: Epic [employer] + phone  Payor Source: Payor: MEDICARE / Plan: MEDICARE / Product Type: Medicare /   Current services in place:     Please see the CC Snapshot and Care Management Flowsheets for specific  details of this Marie Kline care plan.   Additional details/specific concerns r/t this admission:    Goals of Care Wound care and healing.     I will follow this admission in Epic. Please feel free to contact me with questions or for further collaboration in discharge planning.      YESSI Vela  Clinic Care Coordinator  Ph. [phone]  [email or web address]

## 2021-03-30 NOTE — PLAN OF CARE
VSS, 1.5L O2. Tele SR 70s. BENJAMIN. Continues on zithro. High liudmila/high protein diet, SBA. Plan to discharge home today w/ O2, RN PT OT.

## 2021-03-30 NOTE — PROGRESS NOTES
[facility]   Due to a high volume of referrals [facility] has requested this patient's home care episode be transferred to their deferral partner [facility], [phone].  Care team notified.

## 2021-03-30 NOTE — PLAN OF CARE
Alert, orientated x4, mild forgetful. Oxygen on 1.5L per NC. Infrequent cough. Lungs diminished. No edema. Up in chair stand by assist, walker, GB. Food and fluids well. Making needs known. Denies pain. Oxygen tank here for discharge. [place]. No acute variances at this time. Chair alarm on for safety. 1230 verbalized understanding to all written and verbal instructions. Pulmonary appointment made with patient. Food and fluids well. Iv removed. Home care set up. oxygen home tank here. Pleasant mood. No shortness of breath this morning. 1320 discharge to home with daughter. Patient anxious with discharge process. Oxygen sent home with patient. Up with min. Assist and walker.

## 2021-03-30 NOTE — DISCHARGE SUMMARY
[facility]  Hospitalist Discharge Summary      Date of Admission:  3/26/2021  Date of Discharge:  3/30/2021  Discharging Provider: Rangel Alvarez MD      Discharge Diagnoses         Follow-ups Needed After Discharge   Follow-up Appointments     Follow-up and recommended labs and tests       Follow up with primary care provider, Piper Kiser, within 7 days   for hospital follow- up.  The following labs/tests are recommended: F/U OF   abnormal CT chest.    Follow up with Dr Powell pulmonary in 3-4 weeks.             Unresulted Labs Ordered in the Past 30 Days of this Admission     Date and Time Order Name Status Description    3/28/2021 1020 Sputum Culture Aerobic Bacterial Preliminary       These results will be followed up by PCP.    Discharge Disposition   Discharged to home  Condition at discharge: Satisfactory    Hospital Course                    Marie Kline is a 88 year old female who has a history of CLL, CHF, cardiomyopathy, bladder cancer, essential hypertension, hypothyroidism, COPD MI among others, she presents with worsening shortness of breath.  She has been sick for 48 hours.  She is not oxygen dependent, at least she did not use oxygen in the last 1 year until this episode that he was in the last day having low oxygen at home.  She has been using 2 L/min to keep her saturation close to 90%.  She documented oxygen dropped to mid 80s specifically 84% on exertion.  She has reported some fever up to 101-102.  She has been using her home inhalers frequently.  She came to the emergency department for assessment.  Marie Kline is a 88 year old female who has CLL and COPD who presents with worsening shortness of breath and hypoxemia.    1.  Acute hypoxemic respiratory failure in the setting of COPD with acute exacerbation.  Unclear trigger event, could be multifactorial with a viral superimposed infection  2.  History of COPD not oxygen dependent.  3.  Chronic lymphocytic  leukemia.  -Follow-up with oncology, ER doctor consulted oncologist over the phone, they do not plan to do any different treatment or intervention.  4.  Essential hypertension  On metoprolol and furosemide.  5.  Hypothyroidism.    Thyroid supplement.  6.  History of congestive heart failure.  With current NT proBNP elevation some component of diastolic dysfunction is not totally excluded.  On metoprolol, furosemide, aspirin, atorvastatin.  7. Severe malnutrition in context of chronic illness.    Plan.  Inpatient   Strict KATIE's.  Daily weight  Telemetry   O2 per nasal cannula as needed to keep oxygen saturation 92% or higher.  Prednisone 40 mg q day.  Albuterol as needed  DuoNeb nebulization every 4 hours.  Lasix 20 mg 1 tablet daily  Metoprolol ER 25 mg daily  Atorvastatin 20 mg 1 tablet daily  Obtain echocardiogram which showed nml EF.    CT chest  Shows a malignant nodule or soft tissue abnormality with the R hilum and R hilar and mediastinal LAD. Consider bronch and or PET/CT scan. Pt aware of these findings.   Will start PO z pack for bronchitis.      Admitted as inpatient. Afebrile. Did require oxygen supplementation for sats >90%. Was on IV steroids, switched to PO prednisone. CT scan as above, discussed with daughter and patient. Patient wants to pursue this further, will have her see Dr Powell.  Will discharge on PO prednisone taper, oxygen and augmentin.            Consultations This Hospital Stay   CARE MANAGEMENT / SOCIAL WORK IP CONSULT  PHYSICAL THERAPY ADULT IP CONSULT  OCCUPATIONAL THERAPY ADULT IP CONSULT    Code Status   No CPR- Do NOT Intubate    Time Spent on this Encounter   I, Rangel Alvarez MD, personally saw the patient today and spent greater than 30 minutes discharging this patient.       Rangel Alvarez MD  [facility]  [address] [place] [address]  Phone: [phone]  Fax:  [phone]  ______________________________________________________________________    Physical Exam   Vital Signs: Temp: 98.2  F (36.8  C) Temp src: Temporal BP: 131/63 Pulse: 70   Resp: 18 SpO2: 94 % O2 Device: Nasal cannula Oxygen Delivery: 1.5 LPM  Weight: 92 lbs 4.8 oz    Gen - AAO x 3, frail and chronically ill appearing in NAD.  Lungs - diminished BS.  Heart - distant HS, RR,S1+S2 nml, no m/g/r.  Abd - soft, + ttp RUQ, ND, + BS.  Ext - no edema.       Primary Care Physician   Piper Kiser    Discharge Orders      Home Care PT Referral for Hospital Discharge      Home Care OT Referral for Hospital Discharge      Home care nursing referral      Follow-up and recommended labs and tests     Follow up with primary care provider, Piper Kiser, within 7 days for hospital follow- up.  The following labs/tests are recommended: F/U OF abnormal CT chest.    Follow up with Dr Powell pulmonary in 3-4 weeks.     Activity    Your activity upon discharge: activity as tolerated     MD face to face encounter    Documentation of Face to Face and Certification for Home Health Services    I certify that patient: Marie Kline is under my care and that I, or a nurse practitioner or physician's assistant working with me, had a face-to-face encounter that meets the physician face-to-face encounter requirements with this patient on: 3/30/2021.    This encounter with the patient was in whole, or in part, for the following medical condition, which is the primary reason for home health care: COPD.    I certify that, based on my findings, the following services are medically necessary home health services: Nursing, Occupational Therapy and Physical Therapy.    My clinical findings support the need for the above services because: Nurse is needed: To assess oxygenation after changes in medications or other medical regimen.., Occupational Therapy Services are needed to assess and treat cognitive ability and address ADL safety  due to impairment in gait. and Physical Therapy Services are needed to assess and treat the following functional impairments: gait.    Further, I certify that my clinical findings support that this patient is homebound (i.e. absences from home require considerable and taxing effort and are for medical reasons or [employer] services or infrequently or of short duration when for other reasons) because: Requires assistance of another person or specialized equipment to access medical services because patient: Is unable to walk greater than 50 feet without rest...    Based on the above findings. I certify that this patient is confined to the home and needs intermittent skilled nursing care, physical therapy and/or speech therapy.  The patient is under my care, and I have initiated the establishment of the plan of care.  This patient will be followed by a physician who will periodically review the plan of care.  Physician/Provider to provide follow up care: Piper Kiser    Attending hospital physician (the Medicare certified PEC provider): Rangel Alvarez MD  Physician Signature: See electronic signature associated with these discharge orders.  Date: 3/30/2021     No CPR- Do NOT Intubate     Oxygen Adult/Peds    Oxygen Documentation:   I certify that this patient, Marie Kline has been under my care (or a nurse practitioner or physican's assistant working with me). This is the face-to-face encounter for oxygen medical necessity.      Marie Kline is now in a chronic stable state and continues to require supplemental oxygen. Patient has continued oxygen desaturation due to COPD J44.9.    Alternative treatment(s) tried or considered and deemed clinically infective for treatment ofCOPD J45.40 include nebulizers, inhalers and steroids.  If portability is ordered, is the patient mobile within the home? yes    **Patients who qualify for home O2 coverage under the CMS guidelines require ABG tests or O2 sat readings  obtained closest to, but no earlier than 2 days prior to the discharge, as evidence of the need for home oxygen therapy. Testing must be performed while patient is in the chronic stable state. See notes for O2 sats.**     Diet    Follow this diet upon discharge: Orders Placed This Encounter      Snacks/Supplements Adult: Boost Plus; Between Meals      Snacks/Supplements Adult: Boost Shake; With Meals      High Kcal/High Protein Diet, ADULT       Significant Results and Procedures   Results for orders placed or performed during the hospital encounter of 03/26/21   XR Chest Port 1 View    Narrative    EXAM: XR CHEST PORT 1 VW  LOCATION: [facility]  DATE/TIME: 3/26/2021 8:34 PM    INDICATION: Respiratory distress  COMPARISON: 03/05/2020      Impression    IMPRESSION: There is a new rounded 3 cm nodule in the right suprahilar region. Consider follow-up chest CT for further evaluation. No evidence for CHF or pneumonia. No pleural effusion or pneumothorax. Lungs are hyperinflated. Heart size normal. Healed   right ninth rib fracture posteriorly.   CT Chest w/o Contrast    Narrative    CT CHEST WITHOUT CONTRAST March 27, 2021 3:04 PM    CLINICAL HISTORY: Abnormal x-ray - lung nodule, >= 1 cm. Cough, fever,  lung nodule.    TECHNIQUE: CT chest without IV contrast. Multiplanar reformats were  obtained. Dose reduction techniques were used.  CONTRAST: None.    COMPARISON: Chest x-ray 3/26/2021, chest CT 12/8/2018.    FINDINGS:   LUNGS AND PLEURA: Lungs are emphysematous. There is abnormal nodular  airspace consolidation in the medial right upper lobe and in the  perihilar right upper lobe. The more medial nodular opacity measures  approximately 1.9 x 1.3 cm (series 4, image 89). This soft tissue  opacity adjacent to the lateral margin of the right pulmonary hilum  measures approximately 1.6 x 2.0 cm. Precise measurements difficult  due to lack of intravenous contrast. Biapical pleural-based scarring  is present.  There are minor areas of mucous plugging at the lung  bases. No pleural effusion.    MEDIASTINUM/AXILLAE: There appears to be right hilar and mediastinal  adenopathy, again difficult to visualize given lack of intravenous  contrast. A precarinal lymph node measures 2.0 x 2.7 cm (series 2,  image 24). No definite left hilar or axillary adenopathy. There are  mild coronary calcifications. Thoracic aortic caliber within normal  limits.    UPPER ABDOMEN: The spleen appears enlarged. Nonobstructing  calcification in the right renal collecting system measures up to 5  mm.    MUSCULOSKELETAL: No destructive bone lesions.      Impression    IMPRESSION:   1.  Malignant appearing nodule or soft tissue abnormalities associated  with the right pulmonary hilum and in the medial right upper lobe  associated with right hilar and mediastinal adenopathy. Imaging  limited without intravenous contrast. Consider PET/CT and/or  bronchoscopy for further evaluation if a contrast-enhanced study  cannot be obtained.  2.  Emphysematous lungs.  3.  Splenomegaly.  4.  Right nephrolithiasis.    PADMINI MOULTON MD   US Abdomen Complete    Narrative    US ABDOMEN COMPLETE 3/28/2021 9:25 AM    CLINICAL HISTORY: Fever, right upper quadrant pain.    TECHNIQUE: Complete abdominal ultrasound.    COMPARISON: CT 9/17/2018.    FINDINGS:    GALLBLADDER: Normal. No gallstones, wall thickening, or  pericholecystic fluid. Negative sonographic De La Torre's sign.    BILE DUCTS: No biliary dilatation. The common duct measures 9 mm.    LIVER: Normal parenchyma with smooth contour. No focal mass.    RIGHT KIDNEY: Normal size. Normal echogenicity with no hydronephrosis  or mass.     LEFT KIDNEY: Normal size. Normal echogenicity with no hydronephrosis  or mass.     SPLEEN: Enlarged, measuring 18.2 cm in length. There are two splenic  lesions both relatively hypoechoic to the spleen that measure 3.0 and  1.4 cm respectively.    PANCREAS: The visualized portions are  normal.    AORTA: Normal in caliber.     IVC: Normal where visualized.    No ascites.      Impression    IMPRESSION:  1.  Splenomegaly, demonstrated on prior CT scans. Nonspecific splenic  lesions also appear to have been present on prior CT scans, possibly  small hemangiomas or complex cysts.  2.  No acute cause for pain demonstrated.    PADMINI MOULTON MD   Echocardiogram Complete    Narrative    [id number]  [id number]  [id number]  [id number]^KENNETH^VIC^NANCY     [facility]  Echocardiography Laboratory  [address] [place] [address]     Name: MAGGI RODRIGUEZ  MRN: [id number]  : 1932  Study Date: 2021 12:54 PM  Age: 88 yrs  Gender: Female  Patient Location: [facility]  Reason For Study: Heart Failure  Ordering Physician: VIC COOPER  Referring Physician: Piper Kiser MD  Performed By: Stacey Lopez RDCS     BSA: 1.3 m2  Height: 59 in  Weight: 84 lb  HR: 84  BP: 115/49 mmHg  ______________________________________________________________________________  Procedure  Complete Portable Echo Adult.  ______________________________________________________________________________  Interpretation Summary     The left ventricle is normal in size. There is normal left ventricular wall  thickness. Left ventricular systolic function is normal. The visual ejection  fraction is estimated at 60-65%. Diastolic Doppler findings (E/E' ratio and/or  other parameters) suggest left ventricular filling pressures are increased. No  regional wall motion abnormalities noted.  The right ventricle is normal size. The right ventricular systolic function is  normal.  Moderate left atrial enlargement.  Mild mitral and tricuspid regurgitation.  No pericardial effusion.  In direct comparison to the previous study dated 2018, there has been a  significant interval improvement in left ventricular systolic function.  ______________________________________________________________________________  Left  Ventricle  The left ventricle is normal in size. There is normal left ventricular wall  thickness. Left ventricular systolic function is normal. The visual ejection  fraction is estimated at 60-65%. Diastolic Doppler findings (E/E' ratio and/or  other parameters) suggest left ventricular filling pressures are increased. No  regional wall motion abnormalities noted.     Right Ventricle  The right ventricle is normal size. The right ventricular systolic function is  normal.     Atria  The left atrium is moderately dilated. Right atrial size is normal. There is  no color Doppler evidence of an atrial shunt.     Mitral Valve  The mitral valve leaflets are mildly thickened. There is moderate mitral  annular calcification. There is mild (1+) mitral regurgitation.     Tricuspid Valve  There is mild (1+) tricuspid regurgitation. The right ventricular systolic  pressure is approximated at 39.4 mmHg plus the right atrial pressure. IVC  diameter and respiratory changes fall into an intermediate range suggesting an  RA pressure of 8 mmHg. Right ventricular systolic pressure is elevated,  consistent with mild to moderate pulmonary hypertension.     Aortic Valve  There is mild trileaflet aortic sclerosis. There is mild (1+) aortic  regurgitation. Transaortic gradients are mildly increased consistent with  aortic sclerosis.     Pulmonic Valve  There is mild (1+) pulmonic valvular regurgitation. There is no pulmonic  valvular stenosis.     Vessels  The aortic root is normal size. Normal size ascending aorta. Dilation of the  inferior vena cava is present with normal respiratory variation in diameter.     Pericardium  There is no pericardial effusion.     Rhythm  Sinus rhythm was noted.  ______________________________________________________________________________  MMode/2D Measurements & Calculations  IVSd: 0.95 cm     LVIDd: 3.9 cm  LVIDs: 1.8 cm  LVPWd: 0.80 cm  FS: 52.7 %  LV mass(C)d: 101.4 grams  LV mass(C)dI: 79.5  grams/m2  Ao root diam: 2.9 cm  LA dimension: 3.9 cm  asc Aorta Diam: 3.0 cm  LA/Ao: 1.3  LVOT diam: 1.8 cm  LVOT area: 2.5 cm2  RWT: 0.41     Doppler Measurements & Calculations  MV E max eusebio: 85.4 cm/sec  MV A max eusebio: 103.0 cm/sec  MV E/A: 0.83  MV max P.0 mmHg  MV mean P.0 mmHg  MV V2 VTI: 31.1 cm  MV P1/2t max eusebio: 132.0 cm/sec  MV P1/2t: 67.2 msec  MVA(P1/2t): 3.3 cm2  MV dec slope: 575.0 cm/sec2  MV dec time: 0.16 sec  Ao V2 max: 164.0 cm/sec  Ao max P.0 mmHg  AI P1/2t: 393.4 msec  PA acc time: 0.11 sec  TR max eusebio: 314.0 cm/sec  TR max P.4 mmHg     E/E' av.2  Lateral E/e': 10.3  Medial E/e': 14.1     ______________________________________________________________________________  Report approved by: Marcela Morales 2021 02:04 PM               Discharge Medications   Current Discharge Medication List      START taking these medications    Details   amoxicillin-clavulanate (AUGMENTIN) 875-125 MG tablet Take 1 tablet by mouth 2 times daily for 5 days  Qty: 10 tablet, Refills: 0    Associated Diagnoses: Acute and chronic respiratory failure with hypercapnia (H)      predniSONE (DELTASONE) 10 MG tablet 4 tabs daily for 2 days, then 3 tabs daily for 2 days, then 2 tabs daily for 2 days, then 1 tab daily for 2 days, then stop  Qty: 20 tablet, Refills: 0    Associated Diagnoses: Acute and chronic respiratory failure with hypercapnia (H)         CONTINUE these medications which have NOT CHANGED    Details   albuterol (PROVENTIL HFA) 108 (90 Base) MCG/ACT inhaler Inhale 2 puffs into the lungs every 6 hours as needed for shortness of breath / dyspnea or wheezing    Comments: Pharmacy may dispense brand covered by insurance (Proair, or proventil or ventolin or generic albuterol inhaler)      albuterol (PROVENTIL) (2.5 MG/3ML) 0.083% neb solution Take 1 vial (2.5 mg) by nebulization every 6 hours as needed for shortness of breath / dyspnea or wheezing  Qty: 1 Box, Refills: 3    Associated  Diagnoses: COPD with acute exacerbation (H)      aspirin 81 MG EC tablet Take 81 mg by mouth daily      atorvastatin (LIPITOR) 20 MG tablet Take 1 tablet (20 mg) by mouth daily  Qty: 90 tablet, Refills: 4    Associated Diagnoses: ACS (acute coronary syndrome) (H); Hyperlipidemia with target LDL less than 130      ferrous sulfate (IRON) 325 (65 FE) MG tablet Take 325 mg by mouth daily (with breakfast)       fluticasone (FLONASE) 50 MCG/ACT nasal spray Spray 1 spray into both nostrils daily  Qty: 16 g, Refills: 0    Associated Diagnoses: Seasonal allergic rhinitis due to other allergic trigger      furosemide (LASIX) 20 MG tablet Take 1 tablet (20 mg) by mouth daily  Qty: 90 tablet, Refills: 4    Associated Diagnoses: Chronic congestive heart failure, unspecified heart failure type (H)      Immune Globulin, Human, (OCTAGAM) 5 GM/100ML SOLN 300 mg/kg into the vein every 30 days    Hold this medication while in TCU-resume at discharge from TCU    Associated Diagnoses: Acute and chronic respiratory failure with hypercapnia (H)      levothyroxine (SYNTHROID/LEVOTHROID) 75 MCG tablet Take 1 tablet (75 mcg) by mouth daily  Qty: 90 tablet, Refills: 3    Comments: Profile Rx: patient will contact pharmacy when needed  Associated Diagnoses: Other specified hypothyroidism      metoprolol succinate ER (TOPROL-XL) 25 MG 24 hr tablet Take 1 tablet (25 mg) by mouth daily  Qty: 90 tablet, Refills: 3    Comments: Profile Rx: patient will contact pharmacy when needed  Associated Diagnoses: ACS (acute coronary syndrome) (H)      Multiple Vitamins-Minerals (MULTIVITAMIN ADULT) CHEW Take 2 chew tab by mouth daily      sertraline (ZOLOFT) 50 MG tablet Take 1 tablet (50 mg) by mouth daily  Qty: 90 tablet, Refills: 1    Comments: Profile Rx: patient will contact pharmacy when needed  Associated Diagnoses: LESLY (generalized anxiety disorder)      traZODone (DESYREL) 50 MG tablet Take 50 mg by mouth At Bedtime      !! order for DME Equipment  being ordered: Oxygen concentrator, O2-85% resting room air & 02-82% walking room air, Flow rate 2L  Qty: 1 each, Refills: 0    Associated Diagnoses: Chronic systolic heart failure (H); Chronic obstructive pulmonary disease with hypoxia (H)      !! order for DME Equipment being ordered: Oxygen 2LNC continuous with portability tank due to mobility in the home.     Length of need is 99 months.    Associated Diagnoses: Chronic obstructive pulmonary disease, unspecified COPD type (H)       !! - Potential duplicate medications found. Please discuss with provider.        Allergies   Allergies   Allergen Reactions     Diagnostic X-Ray Materials Hives     CT DYE     Contrast Dye Hives     CT DYE     Prolia [Denosumab]      Osteonecrosis jaw       Wound Dressing Adhesive

## 2021-03-31 ENCOUNTER — TELEPHONE (OUTPATIENT)
Dept: FAMILY MEDICINE | Facility: CLINIC | Age: 86
End: 2021-03-31

## 2021-03-31 DIAGNOSIS — J15.1 PNEUMONIA DUE TO PSEUDOMONAS SPECIES, UNSPECIFIED LATERALITY, UNSPECIFIED PART OF LUNG (H): Primary | ICD-10-CM

## 2021-03-31 LAB
BACTERIA SPEC CULT: ABNORMAL
BACTERIA SPEC CULT: ABNORMAL
Lab: ABNORMAL
SPECIMEN SOURCE: ABNORMAL

## 2021-03-31 RX ORDER — CIPROFLOXACIN 500 MG/1
500 TABLET, FILM COATED ORAL 2 TIMES DAILY
Qty: 14 TABLET | Refills: 0 | Status: SHIPPED | OUTPATIENT
Start: 2021-03-31 | End: 2021-04-07

## 2021-03-31 NOTE — PLAN OF CARE
Occupational Therapy Discharge Summary    Reason for therapy discharge:    Discharged to home with home therapy.    Progress towards therapy goal(s). See goals on Care Plan in [facility] electronic health record for goal details.  Goals partially met.  Barriers to achieving goals:   limited tolerance for therapy and discharge from facility.    Therapy recommendation(s):    Continued therapy is recommended.  Rationale/Recommendations:  per treating OT, recommend home OT/PT/RN to progress pt's activity tolerance and IADL independence. Home therapy indicated as pt requires assist and taxing effort to leave her home. .

## 2021-03-31 NOTE — TELEPHONE ENCOUNTER
RECORDS STATUS - ALL OTHER DIAGNOSIS      RECORDS RECEIVED FROM: [facility]   DATE RECEIVED: 4/26/2021   NOTES STATUS DETAILS   OFFICE NOTE from referring provider Complete Dr Powell   OFFICE NOTE from medical oncologist N/A    DISCHARGE SUMMARY from hospital Complete 3/26/2021 Acute and Chronic respiratory failure with hypercapnia (HCC)    DISCHARGE REPORT from the ER Complete 3/26/2021 Acute and Chronic respiratory failure with hypercapnia (HCC)    OPERATIVE REPORT Complete 2/12/2020 Spirometry     1/25/2019 PFT   MEDICATION LIST Complete [facility]   CLINICAL TRIAL TREATMENTS TO DATE     LABS     PATHOLOGY REPORTS N/A Lymph nodes, right inguinal, biopsy:   - Chronic lymphocytic leukemia/small lymphocytic lymphoma.   - Negative for progression/transformation.    ANYTHING RELATED TO DIAGNOSIS Complete Labs last updated on 3/30/2021   GENONOMIC TESTING     TYPE:     IMAGING (NEED IMAGES & REPORT)     CT SCANS Complete CT Chest (scheduled) 4/26/2021    CT Chest 3/27/2021   Xray Chest Complete 3/26/2021   MRI     MAMMO     ULTRASOUND Complete US Abdomen Complete 3/28/2021   PET

## 2021-03-31 NOTE — TELEPHONE ENCOUNTER
Patient noted to be growing pseudomonas in sputum culture. The augmentin she was discharged on would not treat this.    Recommend we switch to Ciprofloxacin 500 mg twice daily for 7 days.  Patient was called and told to stop Augmentin and prescription was sent to [facility] at her request.

## 2021-04-01 ENCOUNTER — TELEPHONE (OUTPATIENT)
Dept: INTERNAL MEDICINE | Facility: CLINIC | Age: 86
End: 2021-04-01

## 2021-04-02 ENCOUNTER — TELEPHONE (OUTPATIENT)
Dept: INTERNAL MEDICINE | Facility: CLINIC | Age: 86
End: 2021-04-02

## 2021-04-02 NOTE — TELEPHONE ENCOUNTER
Irish from [facility] calling for verbal orders for SN 1 x week for 8 weeks, HHA 1 x week for 8 weeks to assist with showers. Call Irish [phone]

## 2021-04-05 ENCOUNTER — PATIENT OUTREACH (OUTPATIENT)
Dept: NURSING | Facility: CLINIC | Age: 86
End: 2021-04-05
Payer: MEDICARE

## 2021-04-05 ASSESSMENT — ACTIVITIES OF DAILY LIVING (ADL): DEPENDENT_IADLS:: TRANSPORTATION;SHOPPING;CLEANING

## 2021-04-05 NOTE — PROGRESS NOTES
Clinic Care Coordination Contact    Clinic Care Coordination Contact  OUTREACH    Referral Information:  Referral Source: IP Report  Primary Diagnosis: Acute hypoxemic respiratory failure in the setting of COPD with acute exacerbation.      Chief Complaint   Patient presents with     Clinic Care Coordination - Post Hospital     Discharge to Home        Universal Utilization:   Difficulty keeping appointments: No  Compliance Concerns: No  No-Show Concerns: No  No PCP office visit in Past Year: No  Utilization    Last refreshed: 4/5/2021 11:07 AM: Hospital Admissions 1           Last refreshed: 4/5/2021 11:07 AM: ED Visits 2           Last refreshed: 4/5/2021 11:07 AM: No Show Count (past year) 0              Current as of: 4/5/2021 11:07 AM            Clinical Concerns:  Current Medical Concerns:  Patient was hospitalized at [facility] from 3.26.2021 to 3.30.2021 with diagnoses of:    1.  Acute hypoxemic respiratory failure in the setting of COPD with acute exacerbation.  Unclear trigger event, could be multifactorial with a viral superimposed infection  2.  History of COPD not oxygen dependent.  3.  Chronic lymphocytic leukemia.  -Follow-up with oncology, ER doctor consulted oncologist over the phone, they do not plan to do any different treatment or intervention.  4.  Essential hypertension  On metoprolol and furosemide.  5.  Hypothyroidism.    Thyroid supplement.  6.  History of congestive heart failure.  With current NT proBNP elevation some component of diastolic dysfunction is not totally excluded.  On metoprolol, furosemide, aspirin, atorvastatin.  7. Severe malnutrition in context of chronic illness.    Patient Active Problem List   Diagnosis     Acute and chronic respiratory failure with hypercapnia (HCC)     Nonischemic cardiomyopathy (H)     Pneumonia     Acute myocardial infarction, initial episode of care (HCC)     CLL (chronic lymphocytic leukemia) (HCC)     CHF (congestive heart  failure) (HCC)     Cardiomyopathy in other diseases classified elsewhere (HCC)     Hyperlipidemia with target LDL less than 130     Health Care Home     COPD exacerbation (H)     LESLY (generalized anxiety disorder)     Hypothyroidism     Back pain with radiation     DDD (degenerative disc disease), lumbar     Splenomegaly     Lumbar degenerative disc disease     Lumbar foraminal stenosis     Central spinal stenosis     Bladder cancer (H)     Sepsis (H)     Senile osteoporosis     CKD (chronic kidney disease) stage 3, GFR 30-59 ml/min     Purpura senilis (H)     Xerosis cutis     Malignant neoplasm of urinary bladder, unspecified site (H)     Hypoxia     Femoral neck fracture (H)     S/P total hip arthroplasty     NSTEMI (non-ST elevated myocardial infarction) (H)     Stress-induced cardiomyopathy     COPD with acute exacerbation (H)     Shoulder fracture     Mouth sores     Closed nondisplaced fracture of pelvis with routine healing, unspecified part of pelvis, subsequent encounter     Pelvic hematoma in female     Closed head injury, subsequent encounter     Multiple skin tears     Pneumonia of left lung due to infectious organism, unspecified part of lung     Chest pain     Coronary artery disease involving native heart with angina pectoris, unspecified vessel or lesion type (H)     Acquired hypogammaglobulinemia (H)     Chronic diastolic (congestive) heart failure (H)     Insomnia     Contusion of scalp, subsequent encounter     Essential (primary) hypertension     Fatigue     History of falling     Hypokalemia     Hyponatremia     Muscle weakness (generalized)     Other abnormalities of gait and mobility     Other injury of unspecified body region, subsequent encounter     Other symbolic dysfunctions     Weakness of left leg     Pulmonary nodules      Patient reports that she is following up with Oncology. Pt states that she has a scan on the 26th, and will have a biopsy done after that.   Current Behavioral  Concerns: No acute concerns noted at this time.    Education Provided to patient: Role of JOSIAS CC and reason for outreach.    Pain  Pain : No  Health Maintenance Reviewed: Not assessed  Clinical Pathway: None    Medication Management:  Medication reconciliation status: Medications reviewed and reconciled.  Continue medications without change.     Functional Status:  Dependent ADLs: Ambulation-walker, Ambulation-cane  Dependent IADLs: Transportation, Shopping, Cleaning(NA)  Bed or wheelchair confined: No  Mobility Status: Independent w/Device    Living Situation:  Current living arrangement: I live alone, I live in assisted living  Type of residence: Independent Senior Living    Lifestyle & Psychosocial Needs:  Lifestyle     Physical activity     Days per week: Not on file     Minutes per session: Not on file     Stress: Not at all     Social Needs     Financial resource strain: Not hard at all     Food insecurity     Worry: Never true     Inability: Never true     Transportation needs     Medical: No     Non-medical: No     Diet: Other(soft food; with boosts and high-fiber diet.)  Inadequate nutrition : No  Tube Feeding: No  Inadequate activity/exercise : No  Significant changes in sleep pattern : No  Transportation means: Family  Financial/Insurance concerns : No  Mu-ism or spiritual beliefs that impact treatment: No  Mental health DX: No  Mental health management concern : No  Informal Support system: Family   Socioeconomic History     Marital status:      Spouse name: Not on file     Number of children: Not on file     Years of education: Not on file     Highest education level: Not on file   Relationships     Social connections     Talks on phone: More than three times a week     Gets together: Not on file     Attends Protestant service: Not on file     Active member of club or organization: Not on file     Attends meetings of clubs or organizations: Not on file     Relationship status: Not on file      Intimate partner violence     Fear of current or ex partner: No     Emotionally abused: No     Physically abused: No     Forced sexual activity: No     Tobacco Use     Smoking status: Former Smoker     Types: Cigarettes     Quit date: 2/3/1996     Years since quittin.1     Smokeless tobacco: Never Used   Substance and Sexual Activity     Alcohol use: No     Alcohol/week: 0.0 standard drinks     Drug use: No     Sexual activity: Not Currently     Partners: Male       We reviewed Pt's discharge summary. Pt reports that she was discharged with oxygen, but has not needed it since her return home. She reports that she has a concentrator at home. Patient is being followed by [facility]. Pt's dtr assists with transportation to/from appointments.      Resources and Interventions:  Current Resources:   Skilled Home Care Services: Skilled Nursing  Community Resources: Home Care(Community paramedic)  Supplies used at home: Oxygen Tubing/Supplies, Nebulizer tubing, Wound Care Supplies  Equipment Currently Used at Home: walker, rolling, grab bar, toilet  Employment Status: retired)    Advance Care Plan/Directive  Advanced Care Plans/Directives on file: Yes  Type Advanced Care Plans/Directives: POLST  Advanced Care Plan/Directive Status: Not Applicable    Referrals Placed: None     Goals:   Goals        General    4. Medical (pt-stated)     Notes - Note edited  10/14/2020  1:58 PM by Kerry Sanderson LSW    Goal Statement: I want my skin tears to heal within 6 months.  Date Goal set: 9.10.2020  Barriers: Medical conditions impacting fragility, many opportunities for injury.  Strengths: Strong support system, recognition of need, coordination with PCP office to manage cares and needs.  Date to Achieve By: 3.10.2021  Patient expressed understanding of goal: Pt reports understanding and denies any additional questions or concerns at this times. JOSIAS CC engaged in AIDET communication during encounter.    Action steps to  achieve this goal:  1. I will manage wound care until PCP appointment.  2. I will follow-up with primary care regarding plan of care.  3. I will follow wound care recommendations until healed.              Patient/Caregiver understanding: Pt reports understanding and denies any additional questions or concerns at this times. SW CC engaged in AIDET communication during encounter.    Outreach Frequency: monthly  Future Appointments              In 2 days Nehal Freeman CNP [place], [place]    In 1 week Kar Nuñez MD; [facility] CY[facility] [place],  PHY YOU[facility]    In 3 weeks [facility], [facility]    In 3 weeks Marva Powell MD [facility] R[facility]    In 1 month Sadiq Stein DPM [facility] [place] [facility], [facility] - [facility]          Plan: Patient will follow-up with PCP and specialists. CHW will outreach to Patient in one month to address goal progression. SW CC will remain available for CC needs and will schedule a clinical review in 6 weeks.     YESSI Vela  Clinic Care Coordinator  Ph. [phone]  [email or web address]

## 2021-04-07 ENCOUNTER — TELEPHONE (OUTPATIENT)
Dept: INTERNAL MEDICINE | Facility: CLINIC | Age: 86
End: 2021-04-07

## 2021-04-07 ENCOUNTER — OFFICE VISIT (OUTPATIENT)
Dept: INTERNAL MEDICINE | Facility: CLINIC | Age: 86
End: 2021-04-07
Payer: MEDICARE

## 2021-04-07 VITALS
OXYGEN SATURATION: 96 % | BODY MASS INDEX: 17.3 KG/M2 | HEART RATE: 71 BPM | TEMPERATURE: 98.4 F | DIASTOLIC BLOOD PRESSURE: 72 MMHG | WEIGHT: 85.8 LBS | HEIGHT: 59 IN | SYSTOLIC BLOOD PRESSURE: 126 MMHG

## 2021-04-07 DIAGNOSIS — C91.10 CLL (CHRONIC LYMPHOCYTIC LEUKEMIA) (H): ICD-10-CM

## 2021-04-07 DIAGNOSIS — J44.1 COPD WITH ACUTE EXACERBATION (H): Primary | ICD-10-CM

## 2021-04-07 DIAGNOSIS — R91.8 PULMONARY NODULES: ICD-10-CM

## 2021-04-07 PROCEDURE — 99214 OFFICE O/P EST MOD 30 MIN: CPT | Performed by: NURSE PRACTITIONER

## 2021-04-07 ASSESSMENT — MIFFLIN-ST. JEOR: SCORE: 724.82

## 2021-04-07 NOTE — PROGRESS NOTES
Assessment & Plan     COPD with acute exacerbation (H) with pulmonary nodules (new one)  - f/u hospitalization from 3/26 to 3/30/2021 doing better  - has pulmonary appt 4/26/2021 with upcoming PET scan with possible bronchoscopy    CLL (chronic lymphocytic leukemia) (HCC)  - has f/u appt for monthly injection with oncologist Dr. Parikh next Wednesday    [id number]}     Patient Instructions   Follow up from hospitalization - doing better    Has follow-up appointment with Oncologist next week and with Pulmonary on 4/26/2021 with Pet Scan and possible bronchoscopy      Return if symptoms worsen or fail to improve.    Nehal Freeman [facility] MARK Salazar is a 88 year old who presents for the following health issues  accompanied by her daughter:    [facility]       Hospital Follow-up Visit:    Hospital/Nursing Home/IP Rehab Facility: [facility]  Date of Admission: 3/26/21  Date of Discharge: 3/30/21  Reason(s) for Admission: chronic lymphocyctic leukemia, COPD, hypoxia, pulmonary nodules      Was your hospitalization related to COVID-19? No   Problems taking medications regularly:  None  Medication changes since discharge: None  Problems adhering to non-medication therapy:  None    Summary of hospitalization:  [facility] discharge summary reviewed  Diagnostic Tests/Treatments reviewed.  Follow up needed: f/u with oncologist and pulmonologist  Other Healthcare Providers Involved in Patient s Care: Has f/u appts with oncology next Wednesday for monthly injection and states she gets labs done prior.  Has f/u appt with Pulmonary Dr. Miller on 4/26/2026 with PET scan and possible bronchoscopy.            Update since discharge: improved. Post Discharge Medication Reconciliation: discharge medications reconciled, continue medications without change.  Plan of care communicated with patient and daughter Margaret              See above.  Was hospitalized from 3/26 to 3/30/2021 for  "increased shortness of breathe.      Imaging:  CT chest:  IMPRESSION:   1.  Malignant appearing nodule or soft tissue abnormalities associated  with the right pulmonary hilum and in the medial right upper lobe  associated with right hilar and mediastinal adenopathy. Imaging  limited without intravenous contrast. Consider PET/CT and/or  bronchoscopy for further evaluation if a contrast-enhanced study  cannot be obtained.  2.  Emphysematous lungs.  3.  Splenomegaly.  4.  Right nephrolithiasis.    US abdomen:  IMPRESSION:  1.  Splenomegaly, demonstrated on prior CT scans. Nonspecific splenic  lesions also appear to have been present on prior CT scans, possibly  small hemangiomas or complex cysts.  2.  No acute cause for pain demonstrated.    Recent labs:  K ok (3/30/21)  BMP with K 3.2 L (2/29/2021)  CBC with WBC 78.2 H, Hgb 9.3 L, and Plt 75 L    Todays visit: States doing better.  Was treated with antibiotic Cipro and Prednisone taper dose.  Has f/u appts with oncology next Wednesday for monthly injection and states she gets labs done prior.  Has f/u appt with Pulmonary Dr. Miller on 4/26/2026 with PET scan and possible bronchoscopy.  Lives at [facility] by self and cooks for self.  Has concentrator for 02 at home but only uses as needed.    No fever.  Occasional cough with shortness of breathe (not new).  No chest pain. Intermittent diarrhea.  No urination issues except when I take the water pill (Lasix)      Review of Systems   Constitutional, HEENT, cardiovascular, pulmonary, gi and gu systems are negative, except as otherwise noted.      Objective    /72 (BP Location: Left arm, Patient Position: Sitting, Cuff Size: Adult Regular)   Pulse 71   Temp 98.4  F (36.9  C) (Oral)   Ht 1.499 m (4' 11\")   Wt 38.9 kg (85 lb 12.8 oz)   LMP  (LMP Unknown)   SpO2 96%   BMI 17.33 kg/m    Body mass index is 17.33 kg/m .     Physical Exam   GENERAL: alert and no distress; accompanied by daughter " Margaret  RESP: lungs diminished but clear to auscultation - no rales, rhonchi or wheezes; not wearing 02 today  CV: regular rate and rhythm, normal S1 S2, no S3 or S4, no murmur, click or rub, no peripheral edema and peripheral pulses strong  ABDOMEN: soft, nontender, no hepatosplenomegaly, no masses and bowel sounds normal  MS: walks with walker  SKIN: bruising on lower legs with no open areas at this time

## 2021-04-07 NOTE — PATIENT INSTRUCTIONS
Follow up from hospitalization - doing better    Has follow-up appointment with Oncologist next week and with Pulmonary on 4/26/2021 with Pet Scan and possible bronchoscopy

## 2021-04-12 ENCOUNTER — TELEPHONE (OUTPATIENT)
Dept: INTERNAL MEDICINE | Facility: CLINIC | Age: 86
End: 2021-04-12

## 2021-04-12 NOTE — TELEPHONE ENCOUNTER
Irish calling Reduce visit frequency this week due to patient's schedule and will resume next week.  Irish with [employer] phone [phone] and it is ok to leave a message.

## 2021-04-13 ENCOUNTER — TELEPHONE (OUTPATIENT)
Dept: INTERNAL MEDICINE | Facility: CLINIC | Age: 86
End: 2021-04-13

## 2021-04-13 NOTE — TELEPHONE ENCOUNTER
Fax received from [facility] - Phys[facility] Orders 04/12/21 for review and signature.  Put in Dr. Kiser's in basket.

## 2021-04-14 DIAGNOSIS — Z85.51 PERSONAL HISTORY OF MALIGNANT NEOPLASM OF BLADDER: ICD-10-CM

## 2021-04-14 DIAGNOSIS — C67.9 BLADDER CANCER (H): Primary | ICD-10-CM

## 2021-04-14 NOTE — TELEPHONE ENCOUNTER
Left a voicemail asking patient to call the clinic back (voicemail did not seem to be private). Please advise Irish of message below.

## 2021-04-15 ENCOUNTER — OFFICE VISIT (OUTPATIENT)
Dept: UROLOGY | Facility: CLINIC | Age: 86
End: 2021-04-15
Payer: MEDICARE

## 2021-04-15 VITALS — HEIGHT: 59 IN | BODY MASS INDEX: 17.04 KG/M2 | HEART RATE: 78 BPM | WEIGHT: 84.5 LBS

## 2021-04-15 DIAGNOSIS — Z85.51 PERSONAL HISTORY OF MALIGNANT NEOPLASM OF BLADDER: ICD-10-CM

## 2021-04-15 DIAGNOSIS — Z79.2 PROPHYLACTIC ANTIBIOTIC: Primary | ICD-10-CM

## 2021-04-15 LAB
ALBUMIN UR-MCNC: NEGATIVE MG/DL
APPEARANCE UR: CLEAR
BILIRUB UR QL STRIP: NEGATIVE
COLOR UR AUTO: YELLOW
GLUCOSE UR STRIP-MCNC: NEGATIVE MG/DL
HGB UR QL STRIP: NEGATIVE
KETONES UR STRIP-MCNC: NEGATIVE MG/DL
LEUKOCYTE ESTERASE UR QL STRIP: NEGATIVE
NITRATE UR QL: NEGATIVE
PH UR STRIP: 6.5 PH (ref 5–7)
SOURCE: NORMAL
SP GR UR STRIP: 1.02 (ref 1–1.03)
UROBILINOGEN UR STRIP-ACNC: 0.2 EU/DL (ref 0.2–1)

## 2021-04-15 PROCEDURE — 81003 URINALYSIS AUTO W/O SCOPE: CPT | Performed by: UROLOGY

## 2021-04-15 PROCEDURE — 99212 OFFICE O/P EST SF 10 MIN: CPT | Mod: 25 | Performed by: UROLOGY

## 2021-04-15 PROCEDURE — 52000 CYSTOURETHROSCOPY: CPT | Performed by: UROLOGY

## 2021-04-15 RX ORDER — CIPROFLOXACIN 500 MG/1
500 TABLET, FILM COATED ORAL ONCE
Qty: 1 TABLET | Refills: 0 | Status: SHIPPED | OUTPATIENT
Start: 2021-04-15 | End: 2021-04-15

## 2021-04-15 ASSESSMENT — PAIN SCALES - GENERAL: PAINLEVEL: NO PAIN (0)

## 2021-04-15 ASSESSMENT — MIFFLIN-ST. JEOR: SCORE: 718.92

## 2021-04-15 NOTE — PATIENT INSTRUCTIONS

## 2021-04-15 NOTE — LETTER
4/15/2021       RE: Marie Kline  [address]  [place] [address]     Dear Colleague,    Thank you for referring your patient, Marie Kline, to the [facility] [place] at [facility]. Please see a copy of my visit note below.    Marie Kline is an 88-year-old with a history of low-grade superficial TCC of the bladder.  She has had no recurrences in many years.  She has lost significant weight in the last year and is being evaluated for pulmonary cancer.  She has had no dysuria or hematuria  Exam: Alert and oriented, quite thin, normal external genitalia and small caruncle at urethral meatus  Flexible cystoscopy-normal urethra mucosa, no bladder tumors or raised erythema.  Normal ureteral orifices.  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  Follow-up yearly    Again, thank you for allowing me to participate in the care of your patient.      Sincerely,    Kar Nuñez MD

## 2021-04-15 NOTE — NURSING NOTE

## 2021-04-15 NOTE — PROGRESS NOTES
Marie Kline is an 88-year-old with a history of low-grade superficial TCC of the bladder.  She has had no recurrences in many years.  She has lost significant weight in the last year and is being evaluated for pulmonary cancer.  She has had no dysuria or hematuria  Exam: Alert and oriented, quite thin, normal external genitalia and small caruncle at urethral meatus  Flexible cystoscopy-normal urethra mucosa, no bladder tumors or raised erythema.  Normal ureteral orifices.  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  Follow-up yearly

## 2021-04-19 ENCOUNTER — TELEPHONE (OUTPATIENT)
Dept: INTERNAL MEDICINE | Facility: CLINIC | Age: 86
End: 2021-04-19

## 2021-04-19 NOTE — TELEPHONE ENCOUNTER
Left message with patient's first name, last initial and  stating provider's partner gave approval to reduce frequency of visits on this patient.  ZAID Sin R.N.

## 2021-04-19 NOTE — TELEPHONE ENCOUNTER
.Reason for Call:  Other call back    Detailed comments: Pt wants to know what DrJohnathan Does Dr. Kiser suggest she see, after the DrJohnathan Leaves?    Phone Number Patient can be reached at: Home number on file [phone] (home)    Best Time: any time    Can we leave a detailed message on this number? YES    Call taken on 4/19/2021 at 9:22 AM by Susan Barba

## 2021-04-20 ENCOUNTER — VIRTUAL VISIT (OUTPATIENT)
Dept: PHARMACY | Facility: CLINIC | Age: 86
End: 2021-04-20
Payer: COMMERCIAL

## 2021-04-20 DIAGNOSIS — E78.5 HYPERLIPIDEMIA WITH TARGET LDL LESS THAN 130: ICD-10-CM

## 2021-04-20 DIAGNOSIS — J44.1 COPD WITH ACUTE EXACERBATION (H): Primary | ICD-10-CM

## 2021-04-20 DIAGNOSIS — Z71.85 VACCINE COUNSELING: ICD-10-CM

## 2021-04-20 DIAGNOSIS — C91.10 CLL (CHRONIC LYMPHOCYTIC LEUKEMIA) (H): ICD-10-CM

## 2021-04-20 DIAGNOSIS — F41.1 GAD (GENERALIZED ANXIETY DISORDER): ICD-10-CM

## 2021-04-20 DIAGNOSIS — I10 ESSENTIAL HYPERTENSION WITH GOAL BLOOD PRESSURE LESS THAN 140/90: ICD-10-CM

## 2021-04-20 DIAGNOSIS — E03.9 HYPOTHYROIDISM, UNSPECIFIED TYPE: ICD-10-CM

## 2021-04-20 DIAGNOSIS — E63.9 NUTRITIONAL DEFICIENCY: ICD-10-CM

## 2021-04-20 DIAGNOSIS — F51.01 PRIMARY INSOMNIA: ICD-10-CM

## 2021-04-20 DIAGNOSIS — J30.2 SEASONAL ALLERGIC RHINITIS, UNSPECIFIED TRIGGER: ICD-10-CM

## 2021-04-20 PROCEDURE — 99605 MTMS BY PHARM NP 15 MIN: CPT | Performed by: PHARMACIST

## 2021-04-20 PROCEDURE — 99607 MTMS BY PHARM ADDL 15 MIN: CPT | Performed by: PHARMACIST

## 2021-04-20 NOTE — PROGRESS NOTES
Medication Therapy Management (MTM) Encounter    ASSESSMENT:                            Medication Adherence/Access: No issues identified    COPD: stable    CLL: stable    Hypertension: stable    Hyperlipidemia: stable    Hypothyroidism: stable    Anxiety:  stable    Insomnia: stable    Allergic rhinitis: patient can try oral antihistamine since nasal spray has not been effective.    Supplements:  Hgb low following recently admission, recheck as planned.    Vaccine:  Recommend Shingrix vaccine - she will discuss with Dr. Kiser and/or Oncologist.    PLAN:                            1.  Claritin (loratadine) 10 mg daily for allergy symptoms  2.  Due for Shingrix    Follow-up: Return in about 6 months (around 10/20/2021) for Medication Therapy Management.    SUBJECTIVE/OBJECTIVE:                          Marie Kline is a 88 year old female called for a follow-up visit. She was referred to me from Dr. Kiser.  Today's visit is a follow-up MTM visit from 8/6/20.  First visit in 2021.     Reason for visit: medication review    Allergies/ADRs: Reviewed in chart  Tobacco: She reports that she quit smoking about 25 years ago. Her smoking use included cigarettes. She has never used smokeless tobacco.  Alcohol: not currently using  Caffeine: 2 cups/day of coffee  Activity: walking during the day with her walker      Medication Adherence/Access: Patient uses pill box(es).  No missed doses.      COPD: Current medications:  Proventil twice daily (using 0-1 times per day), albuterol Nebs as needed (helps with congestion; using at least once daily) and Dulera 2 puffs twice daily (working with prescription assistance program to find a more affordable inhaler)    Completed course of antibiotic and prednisone following recent admission..    Sleeps with 2L oxygen as needed; not needed lately.  Pt is not experiencing side effects.   Pt reports the following symptoms: none at this time  Pt does have an COPD Action Plan on file.    Has spirometry been completed: Yes   Discharged from [facility] on 3/30 for exacerbation- has seen PRIMARY CARE PROVIDER since discharge and scheduled with Pulmonary.    CLL: Getting Immune Globulin infusions every month.  No concerns at this time.  Followed by Oncology.    Hypertension: Current medications include metoprolol succinate 25 mg daily and furosemide 20 mg daily.  Patient does not self-monitor BP.  Patient reports no current medication side effects.  BP Readings from Last 3 Encounters:   04/07/21 126/72   03/30/21 131/63   03/24/21 126/68         Hyperlipidemia: Current therapy includes atorvastatin 20 mg once daily.  Pt reports no significant myalgias or other side effects.  The ASCVD Risk score (Fredericmilad DE GUZMAN Jr., et al., 2013) failed to calculate for the following reasons:    The 2013 ASCVD risk score is only valid for ages 40 to 79    The patient has a prior MI or stroke diagnosis    Recent Labs   Lab Test 01/06/21  1130 05/01/19  1144 10/09/14  0350 10/09/14  0350   CHOL 155 161   < > 167   HDL 46* 45*   < > 55   LDL 89 84   < > 88   TRIG 102 160*   < > 121   CHOLHDLRATIO  --   --   --  3.0    < > = values in this interval not displayed.       Hypothyroidism: Patient is taking levothyroxine 75 mcg daily. Patient is having the following symptoms: hypothyroidism - always tired.   TSH   Date Value Ref Range Status   03/24/2021 0.81 0.40 - 4.00 mU/L Final       Anxiety:  Current medications include: sertraline 50 mg daily.  No concerns at this time.  LESLY-7 SCORE 12/19/2018 4/16/2020 1/6/2021   Total Score 12 0 2       Insomnia: Current medications include: trazodone 50 mg at bedtime. Pt reports no issues. Able to fall back asleep if she does wake.  Occasionally will nap during the day.   No side effects.    Allergic rhinitis: Current medications include fluticasone nasal spray 1 spray(s) once daily. Primary symptoms are runny nose every morning. Pt feels that current therapy is not effective.     Supplements:   Taking chewable MV twice daily and ferrous sulfate 325 mg daily.  Drinks milk and eat ice cream.  Hemoglobin   Date Value Ref Range Status   03/29/2021 9.3 (L) 11.7 - 15.7 g/dL Final     Vaccine:  Had COVID vaccines (2) - Moderno 1/18/21 and 2/15/21.  Recalls being told not to get the shingles vaccine.    Today's Vitals: LMP  (LMP Unknown)   ----------------  Post Discharge Medication Reconciliation Status: discharge medications reconciled, continue medications without change.    I spent 30 minutes with this patient today. All changes were made via collaborative practice agreement with PCP. A copy of the visit note was provided to the patient's primary care provider.    The patient was sent via [employer] a summary of these recommendations.     Yuliana Cats , Pharm D  [phone] (phone)  [phone] (pager)  Medication Therapy Management Pharmacist     Telemedicine Visit Details  Type of service:  Telephone visit  Start Time: 1059 am  End Time: 11:29 AM  Originating Location (patient location): Home  Distant Location (provider location):  [facility]      Medication Therapy Recommendations  Seasonal allergic rhinitis, unspecified trigger    Current Medication: fluticasone (FLONASE) 50 MCG/ACT nasal spray   Rationale: Condition refractory to medication - Ineffective medication - Effectiveness   Recommendation: Change Medication - Start loratadine 10 mg daily   Status: Accepted - no CPA Needed         Vaccine counseling    Rationale: Preventive therapy - Needs additional medication therapy - Indication   Recommendation: Start Medication - SHINGRIX IM - Recommend getting Shingrix vaccine   Status: Accepted per CPA

## 2021-04-20 NOTE — PATIENT INSTRUCTIONS
Recommendations from today's MTM visit:                                                       1.  Try Claritin (loratadine) 10 mg daily for allergy symptoms, this is available over the counter and may help with your runny nose.  2.  Due for Shingrix vaccine (shingles) - you can get this at the pharmacy    Follow-up: Return in about 6 months (around 10/20/2021) for Medication Therapy Management.    It was great to speak with you today.  I value your experience and would be very thankful for your time with providing feedback on our clinic survey. You may receive a survey via email or text message in the next few days.     To schedule another MTM appointment, please call the clinic directly or you may call the MTM scheduling line at [phone] or toll-free at [phone].     My Clinical Pharmacist's contact information:                                                      Please feel free to contact me with any questions or concerns you have.      Yuliana Cast , Pharm D  [phone] (phone)  [phone] (pager)  Medication Therapy Management Pharmacist

## 2021-04-21 DIAGNOSIS — Z53.9 DIAGNOSIS NOT YET DEFINED: Primary | ICD-10-CM

## 2021-04-21 PROCEDURE — G0180 MD CERTIFICATION HHA PATIENT: HCPCS | Performed by: INTERNAL MEDICINE

## 2021-04-23 DIAGNOSIS — J44.1 COPD WITH ACUTE EXACERBATION (H): ICD-10-CM

## 2021-04-23 RX ORDER — ALBUTEROL SULFATE 0.83 MG/ML
2.5 SOLUTION RESPIRATORY (INHALATION) EVERY 6 HOURS PRN
Qty: 3 ML | Refills: 1 | Status: SHIPPED | OUTPATIENT
Start: 2021-04-23 | End: 2021-12-07

## 2021-04-23 NOTE — TELEPHONE ENCOUNTER
Pending Prescriptions:                       Disp   Refills    albuterol (PROVENTIL) (2.5 MG/3ML) 0.083%*3 mL   1            Sig: Take 1 vial (2.5 mg) by nebulization every 6           hours as needed for shortness of breath / dyspnea           or wheezing    Prescription approved per [facility] Refill Protocol.

## 2021-04-26 ENCOUNTER — TELEPHONE (OUTPATIENT)
Dept: INTERNAL MEDICINE | Facility: CLINIC | Age: 86
End: 2021-04-26

## 2021-04-26 ENCOUNTER — VIRTUAL VISIT (OUTPATIENT)
Dept: ONCOLOGY | Facility: CLINIC | Age: 86
End: 2021-04-26
Attending: INTERNAL MEDICINE
Payer: MEDICARE

## 2021-04-26 ENCOUNTER — HOSPITAL ENCOUNTER (OUTPATIENT)
Dept: CT IMAGING | Facility: CLINIC | Age: 86
Discharge: HOME | End: 2021-04-26
Attending: INTERNAL MEDICINE | Admitting: INTERNAL MEDICINE
Payer: MEDICARE

## 2021-04-26 ENCOUNTER — PRE VISIT (OUTPATIENT)
Dept: ONCOLOGY | Facility: CLINIC | Age: 86
End: 2021-04-26

## 2021-04-26 DIAGNOSIS — R59.0 MEDIASTINAL ADENOPATHY: Primary | ICD-10-CM

## 2021-04-26 DIAGNOSIS — J30.89 SEASONAL ALLERGIC RHINITIS DUE TO OTHER ALLERGIC TRIGGER: Primary | ICD-10-CM

## 2021-04-26 DIAGNOSIS — R91.1 LUNG NODULE: ICD-10-CM

## 2021-04-26 PROCEDURE — 99204 OFFICE O/P NEW MOD 45 MIN: CPT | Mod: 95 | Performed by: INTERNAL MEDICINE

## 2021-04-26 PROCEDURE — 71250 CT THORAX DX C-: CPT | Mod: MG

## 2021-04-26 PROCEDURE — 999N001193 HC VIDEO/TELEPHONE VISIT; NO CHARGE

## 2021-04-26 RX ORDER — DESLORATADINE 5 MG/1
5 TABLET ORAL DAILY
Qty: 30 TABLET | Refills: 0 | Status: SHIPPED | OUTPATIENT
Start: 2021-04-26 | End: 2021-07-04

## 2021-04-26 NOTE — TELEPHONE ENCOUNTER
Received a message from the pharmacy saying the patient is requesting an rx for desloratadine. The pharmacy states they have this available in 5mg dose.

## 2021-04-26 NOTE — PROGRESS NOTES
Marie is a 88 year old who is being evaluated via a billable video visit.      How would you like to obtain your AVS? MyChart  If the video visit is dropped, the invitation should be resent by: Text to cell phone: [phone]  Will anyone else be joining your video visit? Subha Adams CMA on 4/26/2021 at 12:20 PM      Video Start Time: 12:44 PM  Video-Visit Details    Type of service:  Video Visit    Video End Time:unable to connect to video    Originating Location (pt. Location): Home    Distant Location (provider location):  [facility]     Platform used for Video Visit: Unable to complete video visit     [facility] Nodule [facility] Initial Visit    Reason for Visit  Marie Kline is a 88 year old female who is referred by Dr Alvarez for lung nodule  Pulmonary HPI    - She was hospitalized in March with shortness of breath, had abnormal lung imaging. She is taking Dulera for COPD but she says it was discontinued (the patient assistance) along with albuterol inhaler.         Other active medical problems include:   - CLL on IVIG since 2004   - COPD for many years (childhood asthma as well)        ROS Pulmonary  Dyspnea: Yes, Cough: No, Chest pain: No, Wheezing: No, Sputum Production: No, Hemoptysis: No  A complete ROS was otherwise negative except as noted in the HPI.  The patient was seen and examined by Marva Powell MD   Current Outpatient Medications   Medication     albuterol (PROVENTIL HFA) 108 (90 Base) MCG/ACT inhaler     albuterol (PROVENTIL) (2.5 MG/3ML) 0.083% neb solution     aspirin 81 MG EC tablet     atorvastatin (LIPITOR) 20 MG tablet     ferrous sulfate (IRON) 325 (65 FE) MG tablet     furosemide (LASIX) 20 MG tablet     Immune Globulin, Human, (OCTAGAM) 5 GM/100ML SOLN     levothyroxine (SYNTHROID/LEVOTHROID) 75 MCG tablet     metoprolol succinate ER (TOPROL-XL) 25 MG 24 hr tablet     Multiple Vitamins-Minerals (MULTIVITAMIN  ADULT) CHEW     order for DME     order for DME     sertraline (ZOLOFT) 50 MG tablet     traZODone (DESYREL) 50 MG tablet     fluticasone (FLONASE) 50 MCG/ACT nasal spray     mometasone-formoterol (DULERA) 200-5 MCG/ACT inhaler     No current facility-administered medications for this visit.      Allergies   Allergen Reactions     Diagnostic X-Ray Materials Hives     CT DYE     Contrast Dye Hives     CT DYE     Prolia [Denosumab]      Osteonecrosis jaw       Wound Dressing Adhesive      Social History     Socioeconomic History     Marital status:      Spouse name: Not on file     Number of children: Not on file     Years of education: Not on file     Highest education level: Not on file   Occupational History     Not on file   Social Needs     Financial resource strain: Not hard at all     Food insecurity     Worry: Never true     Inability: Never true     Transportation needs     Medical: No     Non-medical: No   Tobacco Use     Smoking status: Former Smoker     Types: Cigarettes     Quit date: 2/3/1996     Years since quittin.2     Smokeless tobacco: Never Used   Substance and Sexual Activity     Alcohol use: No     Alcohol/week: 0.0 standard drinks     Drug use: No     Sexual activity: Not Currently     Partners: Male   Lifestyle     Physical activity     Days per week: Not on file     Minutes per session: Not on file     Stress: Not at all   Relationships     Social connections     Talks on phone: More than three times a week     Gets together: Not on file     Attends [employer] service: Not on file     Active member of club or organization: Not on file     Attends meetings of clubs or organizations: Not on file     Relationship status: Not on file     Intimate partner violence     Fear of current or ex partner: No     Emotionally abused: No     Physically abused: No     Forced sexual activity: No   Other Topics Concern      Service Not Asked     Blood Transfusions Not Asked     Caffeine  Concern No     Comment: 1/2-2 cups daily     Occupational Exposure Not Asked     Hobby Hazards Not Asked     Sleep Concern Not Asked     Stress Concern Not Asked     Weight Concern Not Asked     Special Diet Yes     Comment: no added salt, no dairy, more gatorade     Back Care Not Asked     Exercise No     Comment: walking her dog 5 times per day - none recently     Bike Helmet Not Asked     Seat Belt Not Asked     Self-Exams Not Asked     Parent/sibling w/ CABG, MI or angioplasty before 65F 55M? No   Social History Narrative     Not on file     Past Medical History:   Diagnosis Date     Arthritis     Generalized     Bladder cancer (H)      Bladder cancer (H)      Cardiomyopathy (H)      CLL (chronic lymphocytic leukemia) (H)      Congestive heart failure (H) 10/24/2014    flash pulm edema ass w/Takotsubo     COPD (chronic obstructive pulmonary disease) (H)     noc O2     Hypertension      Hypothyroid      Mumps      Myocardial infarction (H) 10/2014    Takotsubo presentation w/mild CAD     Pulmonary edema cardiac cause (H) 10/08/2014    associated w/Takotsubo ACS     Tricuspid valve disorders, 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    TR 2-3+ per echo     Past Surgical History:   Procedure Laterality Date     BIOPSY LYMPH NODE INGUINAL Right 10/23/2018    Procedure: excsional biopsy right inguinal lymph node;  Surgeon: Reji Connors MD;  Location: [facility] OR     BLADDER SURGERY       CORONARY ANGIOGRAPHY ADULT ORDER  10/2014    minimal CAD     CV LEFT HEART CATH N/A 12/11/2018    Procedure: Left Heart Cath;  Surgeon: Sadiq Carrasco MD;  Location:  HEART CARDIAC CATH LAB     CYSTOSCOPY       CYSTOSCOPY, BIOPSY BLADDER, COMBINED N/A 2/9/2016    Procedure: COMBINED CYSTOSCOPY, BIOPSY BLADDER;  Surgeon: Kar Nuñez MD;  Location:  OR     CYSTOSCOPY, TRANSURETHRAL RESECTION (TUR) TUMOR BLADDER, COMBINED N/A 2/9/2016    Procedure: COMBINED CYSTOSCOPY, TRANSURETHRAL RESECTION (TUR) TUMOR BLADDER;   Surgeon: Kar Nuñez MD;  Location: [facility] OR     ESOPHAGOSCOPY, GASTROSCOPY, DUODENOSCOPY (EGD), COMBINED N/A 6/22/2016    Procedure: COMBINED ESOPHAGOSCOPY, GASTROSCOPY, DUODENOSCOPY (EGD);  Surgeon: Freddie Diez MD;  Location: [facility] GI     OPEN REDUCTION INTERNAL FIXATION HIP BIPOLAR Left 11/19/2018    Procedure: Left bipolar hemiarthroplasty;  Surgeon: Scar Reynolds MD;  Location: [facility] OR     Family History   Problem Relation Age of Onset     Cerebrovascular Disease Mother      Cancer Father        Exam:   LMP  (LMP Unknown)   GENERAL APPEARANCE: Well developed, well nourished, alert, and in no apparent distress.  PSYCH: mentation appears normal. and affect normal/bright  Results:  - My interpretation of the images relevant for this visit includes: CT chest today shows resolution of RUL infiltrate/mass-like opacity, mediastinal lymph node 4R is smaller in size  - My interpretation of the PFT's relevant for this visit includes: nonspecific defect, reduced FEV1 with normal ratio 48% of predicted Feb 2020     Assessment and plan: Marie is an 87yo female with CLL recently hospitalized with pneumonia, follow up for abnormal chest imaging  Lung nodule - seen on chest CT, while hospitalized with pneumonia   CT chest in 6 months with contrast and follow up visit same day

## 2021-04-26 NOTE — LETTER
4/26/2021         RE: Marie Kline  [address]  [facility] [address]        Dear Colleague,    Thank you for referring your patient, Marie Kline, to the [facility]. Please see a copy of my visit note below.    Marie is a 88 year old who is being evaluated via a billable video visit.      How would you like to obtain your AVS? MyChart  If the video visit is dropped, the invitation should be resent by: Text to cell phone: [phone]  Will anyone else be joining your video visit? Subha Adams, KEIKO on 4/26/2021 at 12:20 PM      Video Start Time: 12:44 PM  Video-Visit Details    Type of service:  Video Visit    Video End Time:unable to connect to video    Originating Location (pt. Location): Home    Distant Location (provider location):  [facility]     Platform used for Video Visit: Unable to complete video visit     [facility] Nodule Clinic Initial Visit    Reason for Visit  Marie Kline is a 88 year old female who is referred by Dr Alvarez for lung nodule  Pulmonary HPI    - She was hospitalized in March with shortness of breath, had abnormal lung imaging. She is taking Dulera for COPD but she says it was discontinued (the patient assistance) along with albuterol inhaler.         Other active medical problems include:   - CLL on IVIG since 2004   - COPD for many years (childhood asthma as well)        ROS Pulmonary  Dyspnea: Yes, Cough: No, Chest pain: No, Wheezing: No, Sputum Production: No, Hemoptysis: No  A complete ROS was otherwise negative except as noted in the HPI.  The patient was seen and examined by Marva Powell MD   Current Outpatient Medications   Medication     albuterol (PROVENTIL HFA) 108 (90 Base) MCG/ACT inhaler     albuterol (PROVENTIL) (2.5 MG/3ML) 0.083% neb solution     aspirin 81 MG EC tablet     atorvastatin (LIPITOR) 20 MG tablet     ferrous sulfate (IRON)  325 (65 FE) MG tablet     furosemide (LASIX) 20 MG tablet     Immune Globulin, Human, (OCTAGAM) 5 GM/100ML SOLN     levothyroxine (SYNTHROID/LEVOTHROID) 75 MCG tablet     metoprolol succinate ER (TOPROL-XL) 25 MG 24 hr tablet     Multiple Vitamins-Minerals (MULTIVITAMIN ADULT) CHEW     order for DME     order for DME     sertraline (ZOLOFT) 50 MG tablet     traZODone (DESYREL) 50 MG tablet     fluticasone (FLONASE) 50 MCG/ACT nasal spray     mometasone-formoterol (DULERA) 200-5 MCG/ACT inhaler     No current facility-administered medications for this visit.      Allergies   Allergen Reactions     Diagnostic X-Ray Materials Hives     CT DYE     Contrast Dye Hives     CT DYE     Prolia [Denosumab]      Osteonecrosis jaw       Wound Dressing Adhesive      Social History     Socioeconomic History     Marital status:      Spouse name: Not on file     Number of children: Not on file     Years of education: Not on file     Highest education level: Not on file   Occupational History     Not on file   Social Needs     Financial resource strain: Not hard at all     Food insecurity     Worry: Never true     Inability: Never true     Transportation needs     Medical: No     Non-medical: No   Tobacco Use     Smoking status: Former Smoker     Types: Cigarettes     Quit date: 2/3/1996     Years since quittin.2     Smokeless tobacco: Never Used   Substance and Sexual Activity     Alcohol use: No     Alcohol/week: 0.0 standard drinks     Drug use: No     Sexual activity: Not Currently     Partners: Male   Lifestyle     Physical activity     Days per week: Not on file     Minutes per session: Not on file     Stress: Not at all   Relationships     Social connections     Talks on phone: More than three times a week     Gets together: Not on file     Attends Shinto service: Not on file     Active member of club or organization: Not on file     Attends meetings of clubs or organizations: Not on file     Relationship  status: Not on file     Intimate partner violence     Fear of current or ex partner: No     Emotionally abused: No     Physically abused: No     Forced sexual activity: No   Other Topics Concern      Service Not Asked     Blood Transfusions Not Asked     Caffeine Concern No     Comment: 1/2-2 cups daily     Occupational Exposure Not Asked     Hobby Hazards Not Asked     Sleep Concern Not Asked     Stress Concern Not Asked     Weight Concern Not Asked     Special Diet Yes     Comment: no added salt, no dairy, more gatorade     Back Care Not Asked     Exercise No     Comment: walking her dog 5 times per day - none recently     Bike Helmet Not Asked     Seat Belt Not Asked     Self-Exams Not Asked     Parent/sibling w/ CABG, MI or angioplasty before 65F 55M? No   Social History Narrative     Not on file     Past Medical History:   Diagnosis Date     Arthritis     Generalized     Bladder cancer (H)      Bladder cancer (H)      Cardiomyopathy (H)      CLL (chronic lymphocytic leukemia) (H)      Congestive heart failure (H) 10/24/2014    flash pulm edema ass w/Takotsubo     COPD (chronic obstructive pulmonary disease) (H)     noc O2     Hypertension      Hypothyroid      Mumps      Myocardial infarction (H) 10/2014    Takotsubo presentation w/mild CAD     Pulmonary edema cardiac cause (H) 10/08/2014    associated w/Takotsubo ACS     Tricuspid valve disorders, 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    TR 2-3+ per echo     Past Surgical History:   Procedure Laterality Date     BIOPSY LYMPH NODE INGUINAL Right 10/23/2018    Procedure: excsional biopsy right inguinal lymph node;  Surgeon: Reji Connors MD;  Location: [facility] OR     BLADDER SURGERY       CORONARY ANGIOGRAPHY ADULT ORDER  10/2014    minimal CAD     CV LEFT HEART CATH N/A 12/11/2018    Procedure: Left Heart Cath;  Surgeon: Sadiq Carrasco MD;  Location: [facility]ARD[facility]AC C[facility] LAB     CYSTOSCOPY       CYSTOSCOPY, BIOPSY BLADDER, COMBINED N/A 2/9/2016     Procedure: COMBINED CYSTOSCOPY, BIOPSY BLADDER;  Surgeon: Kar Nuñez MD;  Location: [facility] OR     CYSTOSCOPY, TRANSURETHRAL RESECTION (TUR) TUMOR BLADDER, COMBINED N/A 2/9/2016    Procedure: COMBINED CYSTOSCOPY, TRANSURETHRAL RESECTION (TUR) TUMOR BLADDER;  Surgeon: Kar Nuñez MD;  Location: [facility] OR     ESOPHAGOSCOPY, GASTROSCOPY, DUODENOSCOPY (EGD), COMBINED N/A 6/22/2016    Procedure: COMBINED ESOPHAGOSCOPY, GASTROSCOPY, DUODENOSCOPY (EGD);  Surgeon: Freddie Diez MD;  Location: [facility]     OPEN REDUCTION INTERNAL FIXATION HIP BIPOLAR Left 11/19/2018    Procedure: Left bipolar hemiarthroplasty;  Surgeon: Scar Reynolds MD;  Location: [facility] OR     Family History   Problem Relation Age of Onset     Cerebrovascular Disease Mother      Cancer Father        Exam:   LMP  (LMP Unknown)   GENERAL APPEARANCE: Well developed, well nourished, alert, and in no apparent distress.  PSYCH: mentation appears normal. and affect normal/bright  Results:  - My interpretation of the images relevant for this visit includes: CT chest today shows resolution of RUL infiltrate/mass-like opacity, mediastinal lymph node 4R is smaller in size  - My interpretation of the PFT's relevant for this visit includes: nonspecific defect, reduced FEV1 with normal ratio 48% of predicted Feb 2020     Assessment and plan: Marie is an 87yo female with CLL recently hospitalized with pneumonia, follow up for abnormal chest imaging  Lung nodule - seen on chest CT, while hospitalized with pneumonia   CT chest in 6 months with contrast and follow up visit same day          Again, thank you for allowing me to participate in the care of your patient.        Sincerely,        Marva Powell MD

## 2021-04-26 NOTE — LETTER
4/26/2021         RE: Marie Kline  [address]  [facility] [address]        Dear Colleague,    Thank you for referring your patient, Marie Kline, to the [facility]. Please see a copy of my visit note below.    Marie is a 88 year old who is being evaluated via a billable video visit.      How would you like to obtain your AVS? MyChart  If the video visit is dropped, the invitation should be resent by: Text to cell phone: [phone]  Will anyone else be joining your video visit? Subha Adams, KEIKO on 4/26/2021 at 12:20 PM      Video Start Time: 12:44 PM  Video-Visit Details    Type of service:  Video Visit    Video End Time:unable to connect to video    Originating Location (pt. Location): Home    Distant Location (provider location):  [facility]     Platform used for Video Visit: Unable to complete video visit     [facility] Care Nodule Clinic Initial Visit    Reason for Visit  Marie Kline is a 88 year old female who is referred by Dr Alvarez for lung nodule  Pulmonary HPI    - She was hospitalized in March with shortness of breath, had abnormal lung imaging. She is taking Dulera for COPD but she says it was discontinued (the patient assistance) along with albuterol inhaler.         Other active medical problems include:   - CLL on IVIG since 2004   - COPD for many years (childhood asthma as well)        ROS Pulmonary  Dyspnea: Yes, Cough: No, Chest pain: No, Wheezing: No, Sputum Production: No, Hemoptysis: No  A complete ROS was otherwise negative except as noted in the HPI.  The patient was seen and examined by Marva Powell MD   Current Outpatient Medications   Medication     albuterol (PROVENTIL HFA) 108 (90 Base) MCG/ACT inhaler     albuterol (PROVENTIL) (2.5 MG/3ML) 0.083% neb solution     aspirin 81 MG EC tablet     atorvastatin (LIPITOR) 20 MG tablet     ferrous sulfate (IRON)  325 (65 FE) MG tablet     furosemide (LASIX) 20 MG tablet     Immune Globulin, Human, (OCTAGAM) 5 GM/100ML SOLN     levothyroxine (SYNTHROID/LEVOTHROID) 75 MCG tablet     metoprolol succinate ER (TOPROL-XL) 25 MG 24 hr tablet     Multiple Vitamins-Minerals (MULTIVITAMIN ADULT) CHEW     order for DME     order for DME     sertraline (ZOLOFT) 50 MG tablet     traZODone (DESYREL) 50 MG tablet     fluticasone (FLONASE) 50 MCG/ACT nasal spray     mometasone-formoterol (DULERA) 200-5 MCG/ACT inhaler     No current facility-administered medications for this visit.      Allergies   Allergen Reactions     Diagnostic X-Ray Materials Hives     CT DYE     Contrast Dye Hives     CT DYE     Prolia [Denosumab]      Osteonecrosis jaw       Wound Dressing Adhesive      Social History     Socioeconomic History     Marital status:      Spouse name: Not on file     Number of children: Not on file     Years of education: Not on file     Highest education level: Not on file   Occupational History     Not on file   Social Needs     Financial resource strain: Not hard at all     Food insecurity     Worry: Never true     Inability: Never true     Transportation needs     Medical: No     Non-medical: No   Tobacco Use     Smoking status: Former Smoker     Types: Cigarettes     Quit date: 2/3/1996     Years since quittin.2     Smokeless tobacco: Never Used   Substance and Sexual Activity     Alcohol use: No     Alcohol/week: 0.0 standard drinks     Drug use: No     Sexual activity: Not Currently     Partners: Male   Lifestyle     Physical activity     Days per week: Not on file     Minutes per session: Not on file     Stress: Not at all   Relationships     Social connections     Talks on phone: More than three times a week     Gets together: Not on file     Attends Hinduism service: Not on file     Active member of club or organization: Not on file     Attends meetings of clubs or organizations: Not on file     Relationship  status: Not on file     Intimate partner violence     Fear of current or ex partner: No     Emotionally abused: No     Physically abused: No     Forced sexual activity: No   Other Topics Concern      Service Not Asked     Blood Transfusions Not Asked     Caffeine Concern No     Comment: 1/2-2 cups daily     Occupational Exposure Not Asked     Hobby Hazards Not Asked     Sleep Concern Not Asked     Stress Concern Not Asked     Weight Concern Not Asked     Special Diet Yes     Comment: no added salt, no dairy, more gatorade     Back Care Not Asked     Exercise No     Comment: walking her dog 5 times per day - none recently     Bike Helmet Not Asked     Seat Belt Not Asked     Self-Exams Not Asked     Parent/sibling w/ CABG, MI or angioplasty before 65F 55M? No   Social History Narrative     Not on file     Past Medical History:   Diagnosis Date     Arthritis     Generalized     Bladder cancer (H)      Bladder cancer (H)      Cardiomyopathy (H)      CLL (chronic lymphocytic leukemia) (H)      Congestive heart failure (H) 10/24/2014    flash pulm edema ass w/Takotsubo     COPD (chronic obstructive pulmonary disease) (H)     noc O2     Hypertension      Hypothyroid      Mumps      Myocardial infarction (H) 10/2014    Takotsubo presentation w/mild CAD     Pulmonary edema cardiac cause (H) 10/08/2014    associated w/Takotsubo ACS     Tricuspid valve disorders, 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    TR 2-3+ per echo     Past Surgical History:   Procedure Laterality Date     BIOPSY LYMPH NODE INGUINAL Right 10/23/2018    Procedure: excsional biopsy right inguinal lymph node;  Surgeon: Reji Connors MD;  Location: [facility] OR     BLADDER SURGERY       CORONARY ANGIOGRAPHY ADULT ORDER  10/2014    minimal CAD     CV LEFT HEART CATH N/A 12/11/2018    Procedure: Left Heart Cath;  Surgeon: Sadiq Carrasco MD;  Location: [facility]ART [facility]ARD[facility]AC CATH LAB     CYSTOSCOPY       CYSTOSCOPY, BIOPSY BLADDER, COMBINED N/A 2/9/2016     Procedure: COMBINED CYSTOSCOPY, BIOPSY BLADDER;  Surgeon: Kar Nuñez MD;  Location: [facility] OR     CYSTOSCOPY, TRANSURETHRAL RESECTION (TUR) TUMOR BLADDER, COMBINED N/A 2/9/2016    Procedure: COMBINED CYSTOSCOPY, TRANSURETHRAL RESECTION (TUR) TUMOR BLADDER;  Surgeon: Kar Nuñez MD;  Location: [facility] OR     ESOPHAGOSCOPY, GASTROSCOPY, DUODENOSCOPY (EGD), COMBINED N/A 6/22/2016    Procedure: COMBINED ESOPHAGOSCOPY, GASTROSCOPY, DUODENOSCOPY (EGD);  Surgeon: Freddie Diez MD;  Location: [facility]     OPEN REDUCTION INTERNAL FIXATION HIP BIPOLAR Left 11/19/2018    Procedure: Left bipolar hemiarthroplasty;  Surgeon: Scar Reynolds MD;  Location: [facility] OR     Family History   Problem Relation Age of Onset     Cerebrovascular Disease Mother      Cancer Father        Exam:   LMP  (LMP Unknown)   GENERAL APPEARANCE: Well developed, well nourished, alert, and in no apparent distress.  PSYCH: mentation appears normal. and affect normal/bright  Results:  - My interpretation of the images relevant for this visit includes: CT chest today shows resolution of RUL infiltrate/mass-like opacity, mediastinal lymph node 4R is smaller in size  - My interpretation of the PFT's relevant for this visit includes: nonspecific defect, reduced FEV1 with normal ratio 48% of predicted Feb 2020     Assessment and plan: Marie is an 87yo female with CLL recently hospitalized with pneumonia, follow up for abnormal chest imaging  Lung nodule - seen on chest CT, while hospitalized with pneumonia   CT chest in 6 months with contrast and follow up visit same day          Again, thank you for allowing me to participate in the care of your patient.        Sincerely,        Marva Powell MD

## 2021-04-26 NOTE — PATIENT INSTRUCTIONS
Lung spot has resolved.   There is still an enlarged lymph node but it has also decreased in size compared to March.     Let's repeat a CT in about 6 months      Per chart review, appt request order has been deferred until 7/26 by DANGELO Silver RN on 4/28/2021 at 12:59 PM

## 2021-04-30 ENCOUNTER — MEDICAL CORRESPONDENCE (OUTPATIENT)
Dept: HEALTH INFORMATION MANAGEMENT | Facility: CLINIC | Age: 86
End: 2021-04-30

## 2021-05-12 ENCOUNTER — OFFICE VISIT (OUTPATIENT)
Dept: PODIATRY | Facility: CLINIC | Age: 86
End: 2021-05-12
Attending: INTERNAL MEDICINE
Payer: MEDICARE

## 2021-05-12 ENCOUNTER — PATIENT OUTREACH (OUTPATIENT)
Dept: CARE COORDINATION | Facility: CLINIC | Age: 86
End: 2021-05-12

## 2021-05-12 VITALS
DIASTOLIC BLOOD PRESSURE: 52 MMHG | WEIGHT: 90 LBS | SYSTOLIC BLOOD PRESSURE: 100 MMHG | BODY MASS INDEX: 18.14 KG/M2 | HEIGHT: 59 IN

## 2021-05-12 DIAGNOSIS — M21.962 ACQUIRED FOOT DEFORMITY, LEFT: ICD-10-CM

## 2021-05-12 DIAGNOSIS — M21.961 ACQUIRED DEFORMITY OF RIGHT FOOT: ICD-10-CM

## 2021-05-12 DIAGNOSIS — M79.672 LEFT FOOT PAIN: ICD-10-CM

## 2021-05-12 DIAGNOSIS — M25.552 LEFT HIP PAIN: Primary | ICD-10-CM

## 2021-05-12 PROCEDURE — 99203 OFFICE O/P NEW LOW 30 MIN: CPT | Performed by: PODIATRIST

## 2021-05-12 ASSESSMENT — MIFFLIN-ST. JEOR: SCORE: 738.87

## 2021-05-12 NOTE — PROGRESS NOTES
Clinic Care Coordination Contact  [facility]/[place]       Clinical Data: Care Coordinator Outreach  Outreach attempted x 1.  Briefly spoke to patient, but now was not a good time.    Plan:  Care Coordinator will try to reach patient again in 3-5 business days.    Richard Zelaya LUCY  Clinic Care Coordination  [facility] : [place], [place], and [place]  Phone: [phone]    ______________________  Next Outreach:  05/21/21  Planned Outreach Frequency: Monthly  Preferred Phone Number: [phone]    Last Assessment Date: 05/16/19  Care Plan Completion Date: 04/05/21  ______________________

## 2021-05-12 NOTE — PROGRESS NOTES
"ASSESSMENT:  Encounter Diagnoses   Name Primary?     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    MEDICAL DECISION MAKING:  There is really not much that can be, or needs to be, done with her the foot deformity.  She does not have much pain related to the foot.  She is still able to accommodate the foot in shoes, although limited.  We discussed a particular shoe store that might be able to help her find shoes that accommodate the deformity.  I offered a referral to [facility] for foot support and possibly custom shoes.  This is something she will consider.  Is a #15 scalpel I trimmed down the hyperkeratotic lesions on her fifth toes.  She is essentially walking on the lateral aspects of the toes.  I did not charge for this service.  I discussed the foot exam.  Other than the foot structure, there are no concerns or abnormalities.    I cannot tell her if her foot structure affects her hip pain.  It is possible.  Custom orthoses are an option.  She is not interested at this time.    Disclaimer: This note consists of symbols derived from keyboarding, dictation and/or voice recognition software. As a result, there may be errors in the script that have gone undetected. Please consider this when interpreting information found in this chart.    Sadiq Stein DPM, FACFAS, MS    [facility] & Ankle Surgery      ____________________________________________________________________    HPI:       I was asked by Dr. Piper Mustafa to evaluate Marie Kline in consultation for left foot pain.     She reports occasional left foot discomfort.   She said \"It's not my foot,\" referring to the structure/ deformity.  The daughter accompanies her.  She asked if the foot structure might be causing, or contributing to, her left hip pain.  " History of fall and left hip fracture.  She reports that she has limited in the shoes she can wear.  Did not get x-rays but I almost wonder if that sound like a will recall a skew foot    *  Past Medical History:   Diagnosis Date     Arthritis     Generalized     Bladder cancer (H)      Bladder cancer (H)      Cardiomyopathy (H)      CLL (chronic lymphocytic leukemia) (H)      Congestive heart failure (H) 10/24/2014    flash pulm edema ass w/Takotsubo     COPD (chronic obstructive pulmonary disease) (H)     noc O2     Hypertension      Hypothyroid      Mumps      Myocardial infarction (H) 10/2014    Takotsubo presentation w/mild CAD     Pulmonary edema cardiac cause (H) 10/08/2014    associated w/Takotsubo ACS     Tricuspid valve disorders, 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    TR 2-3+ per echo   *  *  Past Surgical History:   Procedure Laterality Date     BIOPSY LYMPH NODE INGUINAL Right 10/23/2018    Procedure: excsional biopsy right inguinal lymph node;  Surgeon: Reji Connors MD;  Location:  OR     BLADDER SURGERY       CORONARY ANGIOGRAPHY ADULT ORDER  10/2014    minimal CAD     CV LEFT HEART CATH N/A 12/11/2018    Procedure: Left Heart Cath;  Surgeon: Sadiq Carrasco MD;  Location:  HEART CARDIAC CATH LAB     CYSTOSCOPY       CYSTOSCOPY, BIOPSY BLADDER, COMBINED N/A 2/9/2016    Procedure: COMBINED CYSTOSCOPY, BIOPSY BLADDER;  Surgeon: Kar Nuñez MD;  Location:  OR     CYSTOSCOPY, TRANSURETHRAL RESECTION (TUR) TUMOR BLADDER, COMBINED N/A 2/9/2016    Procedure: COMBINED CYSTOSCOPY, TRANSURETHRAL RESECTION (TUR) TUMOR BLADDER;  Surgeon: Kar Nuñez MD;  Location:  OR     ESOPHAGOSCOPY, GASTROSCOPY, DUODENOSCOPY (EGD), COMBINED N/A 6/22/2016    Procedure: COMBINED ESOPHAGOSCOPY, GASTROSCOPY, DUODENOSCOPY (EGD);  Surgeon: Freddie Diez MD;  Location:  GI     OPEN REDUCTION INTERNAL FIXATION HIP BIPOLAR Left 11/19/2018    Procedure: Left bipolar hemiarthroplasty;   Surgeon: Scar Reynolds MD;  Location:  OR   *  *  Current Outpatient Medications   Medication Sig Dispense Refill     albuterol (PROVENTIL HFA) 108 (90 Base) MCG/ACT inhaler Inhale 2 puffs into the lungs every 6 hours as needed for shortness of breath / dyspnea or wheezing       albuterol (PROVENTIL) (2.5 MG/3ML) 0.083% neb solution Take 1 vial (2.5 mg) by nebulization every 6 hours as needed for shortness of breath / dyspnea or wheezing 3 mL 1     aspirin 81 MG EC tablet Take 81 mg by mouth daily       atorvastatin (LIPITOR) 20 MG tablet Take 1 tablet (20 mg) by mouth daily 90 tablet 4     desloratadine (CLARINEX) 5 MG tablet Take 1 tablet (5 mg) by mouth daily 30 tablet 0     ferrous sulfate (IRON) 325 (65 FE) MG tablet Take 325 mg by mouth daily (with breakfast)        fluticasone (FLONASE) 50 MCG/ACT nasal spray Spray 1 spray into both nostrils daily 16 g 0     furosemide (LASIX) 20 MG tablet Take 1 tablet (20 mg) by mouth daily 90 tablet 4     Immune Globulin, Human, (OCTAGAM) 5 GM/100ML SOLN 300 mg/kg into the vein every 30 days    Hold this medication while in TCU-resume at discharge from TCU       levothyroxine (SYNTHROID/LEVOTHROID) 75 MCG tablet Take 1 tablet (75 mcg) by mouth daily 90 tablet 3     metoprolol succinate ER (TOPROL-XL) 25 MG 24 hr tablet Take 1 tablet (25 mg) by mouth daily 90 tablet 3     mometasone-formoterol (DULERA) 200-5 MCG/ACT inhaler Inhale 2 puffs into the lungs 2 times daily       Multiple Vitamins-Minerals (MULTIVITAMIN ADULT) CHEW Take 2 chew tab by mouth daily       order for DME Equipment being ordered: Oxygen concentrator, O2-85% resting room air & 02-82% walking room air, Flow rate 2L 1 each 0     order for DME Equipment being ordered: Oxygen 2LNC continuous with portability tank due to mobility in the home.     Length of need is 99 months.       sertraline (ZOLOFT) 50 MG tablet Take 1 tablet (50 mg) by mouth daily 90 tablet 1     traZODone (DESYREL) 50 MG  "tablet Take 50 mg by mouth At Bedtime           EXAM:    Vitals: /52   Ht 1.499 m (4' 11\")   Wt 40.8 kg (90 lb)   LMP  (LMP Unknown)   BMI 18.18 kg/m    BMI: Body mass index is 18.18 kg/m .    Constitutional:  Marie Kline is in no apparent distress, appears well-nourished.  Cooperative with history and physical exam.    Vascular:  Pedal pulses are palpable for both the DP and PT arteries.  CFT < 3 sec.  No edema.      Neuro: Light touch sensation is intact to the L4, L5, S1 distributions  No evidence of weakness, spasticity, or contracture in the lower extremities.     Derm: Normal texture and turgor.  No erythema, ecchymosis, or cyanosis.  No open lesions.  Hyperkeratotic lesions over the lateral skin folds of the bilateral fifth toe.  Generalized xerosis of skin.     Musculoskeletal:    Lower extremity muscle strength is normal.  Her ankle range of motion is adequate.  She has a type of flatfoot deformity, more significant on the left.  There is significant medial deviation of her toes on the left, significant hallux varus.  When nonweightbearing the toes are reducible in the transverse plane.  No sign of skin breakdown.  Varus rotation of bilateral fifth toe.              "

## 2021-05-12 NOTE — LETTER
5/12/2021         RE: Marie Kline  [address]  [place] [address]        Dear Colleague,    Thank you for referring your patient, Marie Kline, to the [facility]. Please see a copy of my visit note below.    ASSESSMENT:  Encounter Diagnoses   Name Primary?     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    MEDICAL DECISION MAKING:  There is really not much that can be, or needs to be, done with her the foot deformity.  She does not have much pain related to the foot.  She is still able to accommodate the foot in shoes, although limited.  We discussed a particular shoe store that might be able to help her find shoes that accommodate the deformity.  I offered a referral to [facility] for foot support and possibly custom shoes.  This is something she will consider.  Is a #15 scalpel I trimmed down the hyperkeratotic lesions on her fifth toes.  She is essentially walking on the lateral aspects of the toes.  I did not charge for this service.  I discussed the foot exam.  Other than the foot structure, there are no concerns or abnormalities.    I cannot tell her if her foot structure affects her hip pain.  It is possible.  Custom orthoses are an option.  She is not interested at this time.    Disclaimer: This note consists of symbols derived from keyboarding, dictation and/or voice recognition software. As a result, there may be errors in the script that have gone undetected. Please consider this when interpreting information found in this chart.    Sadiq Stein DPM, FACFAS, MS    [facility] of Pod[facility]/Foot & Ankle Surgery      ____________________________________________________________________    HPI:       I was asked by Dr. Piper Mustafa to evaluate Marie Kline in  "consultation for left foot pain.     She reports occasional left foot discomfort.   She said \"It's not my foot,\" referring to the structure/ deformity.  The daughter accompanies her.  She asked if the foot structure might be causing, or contributing to, her left hip pain.  History of fall and left hip fracture.  She reports that she has limited in the shoes she can wear.  Did not get x-rays but I almost wonder if that sound like a will recall a skew foot    *  Past Medical History:   Diagnosis Date     Arthritis     Generalized     Bladder cancer (H)      Bladder cancer (H)      Cardiomyopathy (H)      CLL (chronic lymphocytic leukemia) (H)      Congestive heart failure (H) 10/24/2014    flash pulm edema ass w/Takotsubo     COPD (chronic obstructive pulmonary disease) (H)     noc O2     Hypertension      Hypothyroid      Mumps      Myocardial infarction (H) 10/2014    Takotsubo presentation w/mild CAD     Pulmonary edema cardiac cause (H) 10/08/2014    associated w/Takotsubo ACS     Tricuspid valve disorders, 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    TR 2-3+ per echo   *  *  Past Surgical History:   Procedure Laterality Date     BIOPSY LYMPH NODE INGUINAL Right 10/23/2018    Procedure: excsional biopsy right inguinal lymph node;  Surgeon: Reji Connors MD;  Location:  OR     BLADDER SURGERY       CORONARY ANGIOGRAPHY ADULT ORDER  10/2014    minimal CAD     CV LEFT HEART CATH N/A 12/11/2018    Procedure: Left Heart Cath;  Surgeon: Sadiq Carrasco MD;  Location:  HEART CARDIAC CATH LAB     CYSTOSCOPY       CYSTOSCOPY, BIOPSY BLADDER, COMBINED N/A 2/9/2016    Procedure: COMBINED CYSTOSCOPY, BIOPSY BLADDER;  Surgeon: Kar Nuñez MD;  Location:  OR     CYSTOSCOPY, TRANSURETHRAL RESECTION (TUR) TUMOR BLADDER, COMBINED N/A 2/9/2016    Procedure: COMBINED CYSTOSCOPY, TRANSURETHRAL RESECTION (TUR) TUMOR BLADDER;  Surgeon: Kar Nuñez MD;  Location:  OR     ESOPHAGOSCOPY, GASTROSCOPY, " DUODENOSCOPY (EGD), COMBINED N/A 6/22/2016    Procedure: COMBINED ESOPHAGOSCOPY, GASTROSCOPY, DUODENOSCOPY (EGD);  Surgeon: Freddie Diez MD;  Location: [facility] GI     OPEN REDUCTION INTERNAL FIXATION HIP BIPOLAR Left 11/19/2018    Procedure: Left bipolar hemiarthroplasty;  Surgeon: Scar Reynolds MD;  Location: [facility] OR   *  *  Current Outpatient Medications   Medication Sig Dispense Refill     albuterol (PROVENTIL HFA) 108 (90 Base) MCG/ACT inhaler Inhale 2 puffs into the lungs every 6 hours as needed for shortness of breath / dyspnea or wheezing       albuterol (PROVENTIL) (2.5 MG/3ML) 0.083% neb solution Take 1 vial (2.5 mg) by nebulization every 6 hours as needed for shortness of breath / dyspnea or wheezing 3 mL 1     aspirin 81 MG EC tablet Take 81 mg by mouth daily       atorvastatin (LIPITOR) 20 MG tablet Take 1 tablet (20 mg) by mouth daily 90 tablet 4     desloratadine (CLARINEX) 5 MG tablet Take 1 tablet (5 mg) by mouth daily 30 tablet 0     ferrous sulfate (IRON) 325 (65 FE) MG tablet Take 325 mg by mouth daily (with breakfast)        fluticasone (FLONASE) 50 MCG/ACT nasal spray Spray 1 spray into both nostrils daily 16 g 0     furosemide (LASIX) 20 MG tablet Take 1 tablet (20 mg) by mouth daily 90 tablet 4     Immune Globulin, Human, (OCTAGAM) 5 GM/100ML SOLN 300 mg/kg into the vein every 30 days    Hold this medication while in TCU-resume at discharge from TCU       levothyroxine (SYNTHROID/LEVOTHROID) 75 MCG tablet Take 1 tablet (75 mcg) by mouth daily 90 tablet 3     metoprolol succinate ER (TOPROL-XL) 25 MG 24 hr tablet Take 1 tablet (25 mg) by mouth daily 90 tablet 3     mometasone-formoterol (DULERA) 200-5 MCG/ACT inhaler Inhale 2 puffs into the lungs 2 times daily       Multiple Vitamins-Minerals (MULTIVITAMIN ADULT) CHEW Take 2 chew tab by mouth daily       order for DME Equipment being ordered: Oxygen concentrator, O2-85% resting room air & 02-82% walking room air, Flow rate 2L 1 each  "0     order for DME Equipment being ordered: Oxygen 2LNC continuous with portability tank due to mobility in the home.     Length of need is 99 months.       sertraline (ZOLOFT) 50 MG tablet Take 1 tablet (50 mg) by mouth daily 90 tablet 1     traZODone (DESYREL) 50 MG tablet Take 50 mg by mouth At Bedtime           EXAM:    Vitals: /52   Ht 1.499 m (4' 11\")   Wt 40.8 kg (90 lb)   LMP  (LMP Unknown)   BMI 18.18 kg/m    BMI: Body mass index is 18.18 kg/m .    Constitutional:  Marie Kline is in no apparent distress, appears well-nourished.  Cooperative with history and physical exam.    Vascular:  Pedal pulses are palpable for both the DP and PT arteries.  CFT < 3 sec.  No edema.      Neuro: Light touch sensation is intact to the L4, L5, S1 distributions  No evidence of weakness, spasticity, or contracture in the lower extremities.     Derm: Normal texture and turgor.  No erythema, ecchymosis, or cyanosis.  No open lesions.  Hyperkeratotic lesions over the lateral skin folds of the bilateral fifth toe.  Generalized xerosis of skin.     Musculoskeletal:    Lower extremity muscle strength is normal.  Her ankle range of motion is adequate.  She has a type of flatfoot deformity, more significant on the left.  There is significant medial deviation of her toes on the left, significant hallux varus.  When nonweightbearing the toes are reducible in the transverse plane.  No sign of skin breakdown.  Varus rotation of bilateral fifth toe.                  Again, thank you for allowing me to participate in the care of your patient.        Sincerely,        Sadiq Stein, YULIA    "

## 2021-05-13 ENCOUNTER — TELEPHONE (OUTPATIENT)
Dept: INTERNAL MEDICINE | Facility: CLINIC | Age: 86
End: 2021-05-13

## 2021-05-13 NOTE — TELEPHONE ENCOUNTER
Marie has been approved to the [employer] assistance program for Proventil HFA through December 31, 2021. She will receive this medication at no cost through the enrollment period.    A 90 day supply of Proventil HFA will be delivered to Marie's home within 7-10 business days. She has been informed of this approval.    She will contact my office for refills as we must work directly with the .  I will note EPIC as each refill is requested.    Thank you,    Vandana Gonzalez  Prescription Assistance

## 2021-05-19 ENCOUNTER — OFFICE VISIT (OUTPATIENT)
Dept: INTERNAL MEDICINE | Facility: CLINIC | Age: 86
End: 2021-05-19
Payer: MEDICARE

## 2021-05-19 VITALS
RESPIRATION RATE: 14 BRPM | OXYGEN SATURATION: 86 % | BODY MASS INDEX: 18.14 KG/M2 | DIASTOLIC BLOOD PRESSURE: 72 MMHG | HEART RATE: 84 BPM | TEMPERATURE: 98.3 F | HEIGHT: 59 IN | SYSTOLIC BLOOD PRESSURE: 104 MMHG | WEIGHT: 90 LBS

## 2021-05-19 DIAGNOSIS — J20.9 ACUTE BRONCHITIS, UNSPECIFIED ORGANISM: ICD-10-CM

## 2021-05-19 DIAGNOSIS — Z99.81 OXYGEN DEPENDENT: ICD-10-CM

## 2021-05-19 DIAGNOSIS — J44.1 COPD EXACERBATION (H): Primary | ICD-10-CM

## 2021-05-19 PROCEDURE — 99214 OFFICE O/P EST MOD 30 MIN: CPT | Performed by: NURSE PRACTITIONER

## 2021-05-19 RX ORDER — AZITHROMYCIN 250 MG/1
TABLET, FILM COATED ORAL
Qty: 6 TABLET | Refills: 0 | Status: SHIPPED | OUTPATIENT
Start: 2021-05-19 | End: 2021-05-24

## 2021-05-19 ASSESSMENT — MIFFLIN-ST. JEOR: SCORE: 738.87

## 2021-05-19 NOTE — PATIENT INSTRUCTIONS
Encouraged to wear oxygen at 1-2 L all the time (1 L when at rest and 2 L with activity) as long as the 02 stats > 88 %    Will treat suspected bronchitis with antibiotic called Azithromycin as directed (2 tablets today then 1 tablet daily x 4 more days)    Fluids.    Contact [facility] to fax over new order for Oxygo portable machine.

## 2021-05-19 NOTE — PROGRESS NOTES
Assessment & Plan     COPD exacerbation (H)  - continue with inhalers and neb treatments  - azithromycin (ZITHROMAX) 250 MG tablet; Take 2 tablets (500 mg) by mouth daily for 1 day, THEN 1 tablet (250 mg) daily for 4 days.    Acute bronchitis, unspecified organism  - will treat with antibiotic called:  - azithromycin (ZITHROMAX) 250 MG tablet; Take 2 tablets (500 mg) by mouth daily for 1 day, THEN 1 tablet (250 mg) daily for 4 days.    Oxygen dependent  - 02 without oxygen 86 %; placed on 2 L of 02 and sats increased to 92 % then 95 %  - azithromycin (ZITHROMAX) 250 MG tablet; Take 2 tablets (500 mg) by mouth daily for 1 day, THEN 1 tablet (250 mg) daily for 4 days.  - Encouraged to wear oxygen at 1-2 L all the time (1 L when at rest and 2 L with activity) as long as the 02 stats > 88 %  - Contact [facility] to fax over new order for Oxygo portable machine.    [id number]     Patient Instructions   Encouraged to wear oxygen at 1-2 L all the time (1 L when at rest and 2 L with activity) as long as the 02 stats > 88 %    Will treat suspected bronchitis with antibiotic called Azithromycin as directed (2 tablets today then 1 tablet daily x 4 more days)    Fluids.    Contact [facility] to fax over new order for Oxygo portable machine.      Return in about 3 months (around 8/19/2021) for Follow up, with me, in person.    Nehal Freeman CNP  [facility]    Anuj Salazar is a 89 year old who presents for the following health issues     HPI     Changing PCP from Dr. Kiser to me here at the [facility].  Today's issues: been on oxygen 1 L past 3 days due to shortness of breathe and discolored phlegm.  Has had 02 for some time but not always wearing.  States with activity I lose my stamina.  Has a portable 02 machine called Oxygo but stopped working on way to the clinic so I was very short of breathe for my visit.    No fever. No wheezing No chest  "pain.  No stomach or urination issues.  I bruise easily.    I saw her last month on 4/7/2021 for COPD s/p hospitalization from 3/26 to 3/30/2021.  Found to have pulmonary nodule and scheduled for CT scan and follow up with Pulmonologist. Last labs 3/29/2021 with K ok; Urine ok; BMP ok with K 3.2 L; and CBC with Hgb 9.3 L with platelets 75 L and WBC 78.2 H    CT Chest (4/26/2021) showed:  IMPRESSION:   1.  Previously described areas of nodular consolidation in the right  upper lobe have almost entirely resolved.  2.  Mildly enlarged precarinal lymph node in the mediastinum has  decreased slightly in size.  3.  Emphysema.  4.  Splenomegaly.  5.  Nonobstructing 0.5 cm stone in the upper pole of the right kidney  is unchanged.    Also followed by oncology for monthly injections for CLL (chronic lymphocytic leukemia)      Review of Systems   Constitutional, HEENT, cardiovascular, pulmonary, gi and gu systems are negative, except as otherwise noted.      Objective    /72 (BP Location: Right arm, Patient Position: Sitting, Cuff Size: Adult Regular)   Pulse 84   Temp 98.3  F (36.8  C) (Oral)   Resp 14   Ht 1.499 m (4' 11\")   Wt 40.8 kg (90 lb)   LMP  (LMP Unknown)   SpO2 (!) 86%   BMI 18.18 kg/m    Body mass index is 18.18 kg/m .     Physical Exam   GENERAL: alert and no distress; accompanied by daughter Margaret  RESP: lungs diminished but clear to auscultation - no rales, rhonchi or wheezes  CV: regular rate and rhythm, normal S1 S2, no S3 or S4, no murmur, click or rub, no peripheral edema and peripheral pulses strong  ABDOMEN: soft, nontender, no hepatosplenomegaly, no masses and bowel sounds normal  MS: gets around by walker  SKIN: no suspicious lesions or rashes but has bruising on lower legs without indication of infection or open area            "

## 2021-05-24 ENCOUNTER — TELEPHONE (OUTPATIENT)
Dept: INTERNAL MEDICINE | Facility: CLINIC | Age: 86
End: 2021-05-24

## 2021-05-24 NOTE — TELEPHONE ENCOUNTER
Spoke to patient, she did receive her Proventil last Friday in the mail. However, she was trying to get Dulera through the Prescription Assistance program, but states that Vandana received a response that Dulera wouldn't be covered because she is on oxygen all day.    Reached out to Marie for clarification, she states that [employer] discontinued their  program for this so patient is not able to get the inhalers through them. However, Marie states that she spoke to patient's daughter and the Pulmonologist (outside of [place]) said patient only needed to be taking the Proventil and continue on oxygen 24 hours a day.    Nothing further needed for patient.

## 2021-05-24 NOTE — TELEPHONE ENCOUNTER
Patient called and said Nehal needs to prescribe a new rescue inhaler so Vandana Gastelum in patient prescription assistance can get this free for her. Writer spoke to Vandana and was told Vandana got the patient the HFA proventil free for the rest of the year. Perhaps the patient is misunderstanding something? If Nehal needs her to have another rescue inhaler in additon to the HFA proventil please prescribe and then we will have to let Vandana know this.

## 2021-05-26 ENCOUNTER — PATIENT OUTREACH (OUTPATIENT)
Dept: CARE COORDINATION | Facility: CLINIC | Age: 86
End: 2021-05-26

## 2021-05-26 NOTE — PROGRESS NOTES
Care Coordination Clinician Chart Review  Situation: Patient chart reviewed by care coordinator.       Background: Care Coordination initial assessment and enrollment to Care Coordination was 5/2019.   Patient centered goals were developed with participation from patient.  JOSIAS CH handed patient off to CHW for continued outreach every 30 days.        Assessment: Per chart review, patient outreach completed by CC CHW on 5.12.2021 (unreachable). Patient's goal remains appropriate and relevant at this time.   Patient is not due for updated Complex Care Plan.  Annual assessment will be due 4/2022.      Goals        Patient Stated      4. Medical (pt-stated)      Goal Statement: I want my skin tears to heal within 9 months.  Date Goal set: 9.10.2020 (extended 5/26/2021)  Barriers: Medical conditions impacting fragility, many opportunities for injury.  Strengths: Strong support system, recognition of need, coordination with PCP office to manage cares and needs.  Date to Achieve By: 6.10.2021  Patient expressed understanding of goal: Pt reports understanding and denies any additional questions or concerns at this times. JOSIAS CH engaged in AIDET communication during encounter.    Action steps to achieve this goal:  1. I will manage wound care until PCP appointment.  2. I will follow-up with primary care regarding plan of care.  3. I will follow wound care recommendations until healed.                  Plan/Recommendations: The patient will continue working with Care Coordination to achieve goal as above.  CHW will involve JOSIAS CH as needed or if patient is ready to move to maintenance.  JOSIAS CC will continue to monitor progress to goals and CHW outreaches every 6 weeks.   Care Plan updated and mailed to patient: YESSI Wynn  Clinic Care Coordinator  Ph. [phone]  [email or web address]

## 2021-06-11 ENCOUNTER — TRANSFERRED RECORDS (OUTPATIENT)
Dept: HEALTH INFORMATION MANAGEMENT | Facility: CLINIC | Age: 86
End: 2021-06-11

## 2021-06-17 ENCOUNTER — PATIENT OUTREACH (OUTPATIENT)
Dept: CARE COORDINATION | Facility: CLINIC | Age: 86
End: 2021-06-17

## 2021-06-17 ENCOUNTER — NURSE TRIAGE (OUTPATIENT)
Dept: INTERNAL MEDICINE | Facility: CLINIC | Age: 86
End: 2021-06-17

## 2021-06-17 NOTE — TELEPHONE ENCOUNTER
Reason for Disposition    Known COPD or other severe lung disease (i.e., bronchiectasis, cystic fibrosis, lung surgery) and worsening symptoms (i.e., increased sputum purulence or amount, increased breathing difficulty)    Additional Information    Negative: Bluish (or gray) lips or face    Negative: Severe difficulty breathing (e.g., struggling for each breath, speaks in single words)    Negative: Rapid onset of cough and has hives    Negative: Coughing started suddenly after medicine, an allergic food or bee sting    Negative: Difficulty breathing after exposure to flames, smoke, or fumes    Negative: Sounds like a life-threatening emergency to the triager    Negative: Previous asthma attacks and this feels like asthma attack    Negative: Chest pain present when not coughing    Negative: Difficulty breathing    Negative: Passed out (i.e., fainted, collapsed and was not responding)    Negative: Patient sounds very sick or weak to the triager    Negative: Coughed up > 1 tablespoon (15 ml) blood (Exception: blood-tinged sputum)    Negative: Fever > 103 F (39.4 C)    Negative: Fever > 101 F (38.3 C) and over 60 years of age    Negative: Fever > 100.0 F (37.8 C) and has diabetes mellitus or a weak immune system (e.g., HIV positive, cancer chemotherapy, organ transplant, splenectomy, chronic steroids)    Negative: Fever > 100.0 F (37.8 C) and bedridden (e.g., nursing home patient, stroke, chronic illness, recovering from surgery)    Negative: Increasing ankle swelling    Negative: Wheezing is present    Negative: SEVERE coughing spells (e.g., whooping sound after coughing, vomiting after coughing)    Negative: Coughing up shahida-colored (reddish-brown) or blood-tinged sputum    Negative: Fever present > 3 days (72 hours)    Negative: Fever returns after gone for over 24 hours and symptoms worse or not improved    Negative: Using nasal washes and pain medicine > 24 hours and sinus pain persists    Answer Assessment -  "Initial Assessment Questions  1. ONSET: \"When did the cough begin?\"       Per patient, has COPD so coughs all the time but c/o prod cough with green phlegm 6-16-21.  2. SEVERITY: \"How bad is the cough today?\"       States cough is worse today.  3. RESPIRATORY DISTRESS: \"Describe your breathing.\"       States she is having some shortness of breath.  4. FEVER: \"Do you have a fever?\" If so, ask: \"What is your temperature, how was it measured, and when did it start?\"      Patient denies fever.  5. HEMOPTYSIS: \"Are you coughing up any blood?\" If so ask: \"How much?\" (flecks, streaks, tablespoons, etc.)      Denies hemoptysis.  6. TREATMENT: \"What have you done so far to treat the cough?\" (e.g., meds, fluids, humidifier)      Patient states she has increased her water intake.  7. CARDIAC HISTORY: \"Do you have any history of heart disease?\" (e.g., heart attack, congestive heart failure)       Patient states no cardiac history.  8. LUNG HISTORY: \"Do you have any history of lung disease?\"  (e.g., pulmonary embolus, asthma, emphysema)      Patient states she has copd.  9. PE RISK FACTORS: \"Do you have a history of blood clots?\" (or: recent major surgery, recent prolonged travel, bedridden)      No per patient.  10. OTHER SYMPTOMS: \"Do you have any other symptoms? (e.g., runny nose, wheezing, chest pain)        Patient states since she started on her O2 at 1L per NC x 1 month ago, c/o nasal drainage and having some shortness of breath.  11. PREGNANCY: \"Is there any chance you are pregnant?\" \"When was your last menstrual period?\"        NA  12. TRAVEL: \"Have you traveled out of the country in the last month?\" (e.g., travel history, exposures)        No recent travel.    Protocols used: COUGH-A-OH      "

## 2021-06-17 NOTE — PROGRESS NOTES
Clinic Care Coordination Contact  [facility]/Voicemail       Clinical Data: Care Coordinator Outreach  Outreach attempted x 2.  Left message on patient's voicemail with call back information and requested return call.    Plan: Care Coordinator will try to reach patient again in 10 business days.    KOREY Lawrence  Clinic Care Coordination  [facility] : [place], [place], and [place]  Phone: [phone]    ______________________  Next Outreach:  06/30/21  Planned Outreach Frequency: Monthly  Preferred Phone Number: [phone]    Last Assessment Date: 05/16/19  Care Plan Completion Date: 04/05/21  ______________________

## 2021-07-04 ENCOUNTER — HOSPITAL ENCOUNTER (INPATIENT)
Facility: CLINIC | Age: 86
LOS: 3 days | Discharge: HOME | DRG: 177 | End: 2021-07-07
Attending: EMERGENCY MEDICINE | Admitting: HOSPITALIST
Payer: MEDICARE

## 2021-07-04 ENCOUNTER — APPOINTMENT (OUTPATIENT)
Dept: GENERAL RADIOLOGY | Facility: CLINIC | Age: 86
DRG: 177 | End: 2021-07-04
Attending: EMERGENCY MEDICINE
Payer: MEDICARE

## 2021-07-04 DIAGNOSIS — J18.9 COMMUNITY ACQUIRED PNEUMONIA, UNSPECIFIED LATERALITY: ICD-10-CM

## 2021-07-04 DIAGNOSIS — J44.1 COPD EXACERBATION (H): ICD-10-CM

## 2021-07-04 LAB
ANION GAP SERPL CALCULATED.3IONS-SCNC: 2 MMOL/L (ref 3–14)
ANISOCYTOSIS BLD QL SMEAR: SLIGHT
BACTERIA SPEC CULT: ABNORMAL
BACTERIA SPEC CULT: ABNORMAL
BASOPHILS # BLD AUTO: 0 10E9/L (ref 0–0.2)
BASOPHILS NFR BLD AUTO: 0 %
BUN SERPL-MCNC: 13 MG/DL (ref 7–30)
CALCIUM SERPL-MCNC: 8.8 MG/DL (ref 8.5–10.1)
CHLORIDE SERPL-SCNC: 98 MMOL/L (ref 94–109)
CO2 SERPL-SCNC: 34 MMOL/L (ref 20–32)
CREAT SERPL-MCNC: 0.85 MG/DL (ref 0.52–1.04)
DIFFERENTIAL METHOD BLD: ABNORMAL
EOSINOPHIL # BLD AUTO: 0 10E9/L (ref 0–0.7)
EOSINOPHIL NFR BLD AUTO: 0 %
ERYTHROCYTE [DISTWIDTH] IN BLOOD BY AUTOMATED COUNT: 16.8 % (ref 10–15)
GFR SERPL CREATININE-BSD FRML MDRD: 61 ML/MIN/{1.73_M2}
GLUCOSE SERPL-MCNC: 117 MG/DL (ref 70–99)
GRAM STN SPEC: ABNORMAL
HCT VFR BLD AUTO: 34.1 % (ref 35–47)
HGB BLD-MCNC: 10.3 G/DL (ref 11.7–15.7)
LABORATORY COMMENT REPORT: NORMAL
LYMPHOCYTES # BLD AUTO: 76.8 10E9/L (ref 0.8–5.3)
LYMPHOCYTES NFR BLD AUTO: 91 %
Lab: ABNORMAL
MCH RBC QN AUTO: 29.3 PG (ref 26.5–33)
MCHC RBC AUTO-ENTMCNC: 30.2 G/DL (ref 31.5–36.5)
MCV RBC AUTO: 97 FL (ref 78–100)
MONOCYTES # BLD AUTO: 3.4 10E9/L (ref 0–1.3)
MONOCYTES NFR BLD AUTO: 4 %
NEUTROPHILS # BLD AUTO: 4.2 10E9/L (ref 1.6–8.3)
NEUTROPHILS NFR BLD AUTO: 5 %
NT-PROBNP SERPL-MCNC: 2469 PG/ML (ref 0–1800)
OVALOCYTES BLD QL SMEAR: SLIGHT
PLATELET # BLD AUTO: 83 10E9/L (ref 150–450)
PLATELET # BLD EST: ABNORMAL 10*3/UL
POTASSIUM SERPL-SCNC: 3.7 MMOL/L (ref 3.4–5.3)
RBC # BLD AUTO: 3.52 10E12/L (ref 3.8–5.2)
SARS-COV-2 RNA RESP QL NAA+PROBE: NEGATIVE
SODIUM SERPL-SCNC: 134 MMOL/L (ref 133–144)
SPECIMEN SOURCE: ABNORMAL
SPECIMEN SOURCE: ABNORMAL
SPECIMEN SOURCE: NORMAL
TROPONIN I SERPL-MCNC: 0.02 UG/L (ref 0–0.04)
WBC # BLD AUTO: 84.4 10E9/L (ref 4–11)

## 2021-07-04 PROCEDURE — 85025 COMPLETE CBC W/AUTO DIFF WBC: CPT | Performed by: EMERGENCY MEDICINE

## 2021-07-04 PROCEDURE — 87635 SARS-COV-2 COVID-19 AMP PRB: CPT | Performed by: EMERGENCY MEDICINE

## 2021-07-04 PROCEDURE — 99285 EMERGENCY DEPT VISIT HI MDM: CPT | Mod: 25

## 2021-07-04 PROCEDURE — 258N000003 HC RX IP 258 OP 636: Performed by: EMERGENCY MEDICINE

## 2021-07-04 PROCEDURE — 250N000012 HC RX MED GY IP 250 OP 636 PS 637: Performed by: EMERGENCY MEDICINE

## 2021-07-04 PROCEDURE — 84484 ASSAY OF TROPONIN QUANT: CPT | Performed by: EMERGENCY MEDICINE

## 2021-07-04 PROCEDURE — 250N000011 HC RX IP 250 OP 636: Performed by: HOSPITALIST

## 2021-07-04 PROCEDURE — 99223 1ST HOSP IP/OBS HIGH 75: CPT | Mod: AI | Performed by: HOSPITALIST

## 2021-07-04 PROCEDURE — 71045 X-RAY EXAM CHEST 1 VIEW: CPT

## 2021-07-04 PROCEDURE — 999N000156 HC STATISTIC RCP CONSULT EA 30 MIN

## 2021-07-04 PROCEDURE — 120N000001 HC R&B MED SURG/OB

## 2021-07-04 PROCEDURE — 250N000009 HC RX 250: Performed by: EMERGENCY MEDICINE

## 2021-07-04 PROCEDURE — 94640 AIRWAY INHALATION TREATMENT: CPT | Mod: 76

## 2021-07-04 PROCEDURE — 250N000013 HC RX MED GY IP 250 OP 250 PS 637: Performed by: EMERGENCY MEDICINE

## 2021-07-04 PROCEDURE — 83880 ASSAY OF NATRIURETIC PEPTIDE: CPT | Performed by: EMERGENCY MEDICINE

## 2021-07-04 PROCEDURE — 250N000011 HC RX IP 250 OP 636: Performed by: EMERGENCY MEDICINE

## 2021-07-04 PROCEDURE — 96365 THER/PROPH/DIAG IV INF INIT: CPT

## 2021-07-04 PROCEDURE — 80048 BASIC METABOLIC PNL TOTAL CA: CPT | Performed by: EMERGENCY MEDICINE

## 2021-07-04 PROCEDURE — 94640 AIRWAY INHALATION TREATMENT: CPT

## 2021-07-04 PROCEDURE — 93005 ELECTROCARDIOGRAM TRACING: CPT

## 2021-07-04 PROCEDURE — 250N000009 HC RX 250: Performed by: HOSPITALIST

## 2021-07-04 PROCEDURE — 250N000013 HC RX MED GY IP 250 OP 250 PS 637: Performed by: HOSPITALIST

## 2021-07-04 PROCEDURE — 999N000157 HC STATISTIC RCP TIME EA 10 MIN

## 2021-07-04 PROCEDURE — C9803 HOPD COVID-19 SPEC COLLECT: HCPCS

## 2021-07-04 PROCEDURE — 87205 SMEAR GRAM STAIN: CPT | Performed by: HOSPITALIST

## 2021-07-04 RX ORDER — ONDANSETRON 2 MG/ML
4 INJECTION INTRAMUSCULAR; INTRAVENOUS EVERY 6 HOURS PRN
Status: DISCONTINUED | OUTPATIENT
Start: 2021-07-04 | End: 2021-07-07 | Stop reason: HOSPADM

## 2021-07-04 RX ORDER — IPRATROPIUM BROMIDE AND ALBUTEROL SULFATE 2.5; .5 MG/3ML; MG/3ML
3 SOLUTION RESPIRATORY (INHALATION) ONCE
Status: COMPLETED | OUTPATIENT
Start: 2021-07-04 | End: 2021-07-04

## 2021-07-04 RX ORDER — FLUTICASONE PROPIONATE 50 MCG
1 SPRAY, SUSPENSION (ML) NASAL DAILY
Status: DISCONTINUED | OUTPATIENT
Start: 2021-07-04 | End: 2021-07-07 | Stop reason: HOSPADM

## 2021-07-04 RX ORDER — PREDNISONE 20 MG/1
40 TABLET ORAL ONCE
Status: COMPLETED | OUTPATIENT
Start: 2021-07-04 | End: 2021-07-04

## 2021-07-04 RX ORDER — ACETAMINOPHEN 325 MG/1
650 TABLET ORAL EVERY 4 HOURS PRN
Status: DISCONTINUED | OUTPATIENT
Start: 2021-07-04 | End: 2021-07-07 | Stop reason: HOSPADM

## 2021-07-04 RX ORDER — ATORVASTATIN CALCIUM 20 MG/1
20 TABLET, FILM COATED ORAL EVERY EVENING
Status: DISCONTINUED | OUTPATIENT
Start: 2021-07-04 | End: 2021-07-07 | Stop reason: HOSPADM

## 2021-07-04 RX ORDER — IPRATROPIUM BROMIDE AND ALBUTEROL SULFATE 2.5; .5 MG/3ML; MG/3ML
3 SOLUTION RESPIRATORY (INHALATION)
Status: DISCONTINUED | OUTPATIENT
Start: 2021-07-04 | End: 2021-07-07 | Stop reason: HOSPADM

## 2021-07-04 RX ORDER — ASPIRIN 81 MG/1
81 TABLET ORAL DAILY
Status: DISCONTINUED | OUTPATIENT
Start: 2021-07-04 | End: 2021-07-07 | Stop reason: HOSPADM

## 2021-07-04 RX ORDER — ACETAMINOPHEN 325 MG/1
975 TABLET ORAL ONCE
Status: DISCONTINUED | OUTPATIENT
Start: 2021-07-04 | End: 2021-07-04

## 2021-07-04 RX ORDER — ALBUTEROL SULFATE 0.83 MG/ML
2.5 SOLUTION RESPIRATORY (INHALATION)
Status: DISCONTINUED | OUTPATIENT
Start: 2021-07-04 | End: 2021-07-07 | Stop reason: HOSPADM

## 2021-07-04 RX ORDER — LOPERAMIDE HCL 2 MG
2 CAPSULE ORAL DAILY PRN
COMMUNITY
End: 2022-06-09

## 2021-07-04 RX ORDER — FUROSEMIDE 10 MG/ML
20 INJECTION INTRAMUSCULAR; INTRAVENOUS ONCE
Status: COMPLETED | OUTPATIENT
Start: 2021-07-04 | End: 2021-07-04

## 2021-07-04 RX ORDER — CEFTRIAXONE 2 G/1
2 INJECTION, POWDER, FOR SOLUTION INTRAMUSCULAR; INTRAVENOUS ONCE
Status: COMPLETED | OUTPATIENT
Start: 2021-07-04 | End: 2021-07-04

## 2021-07-04 RX ORDER — LIDOCAINE 40 MG/G
CREAM TOPICAL
Status: DISCONTINUED | OUTPATIENT
Start: 2021-07-04 | End: 2021-07-07 | Stop reason: HOSPADM

## 2021-07-04 RX ORDER — ONDANSETRON 4 MG/1
4 TABLET, ORALLY DISINTEGRATING ORAL EVERY 6 HOURS PRN
Status: DISCONTINUED | OUTPATIENT
Start: 2021-07-04 | End: 2021-07-07 | Stop reason: HOSPADM

## 2021-07-04 RX ORDER — ACETAMINOPHEN 325 MG/1
650 TABLET ORAL ONCE
Status: COMPLETED | OUTPATIENT
Start: 2021-07-04 | End: 2021-07-04

## 2021-07-04 RX ORDER — LEVOTHYROXINE SODIUM 75 UG/1
75 TABLET ORAL DAILY
Status: DISCONTINUED | OUTPATIENT
Start: 2021-07-04 | End: 2021-07-07 | Stop reason: HOSPADM

## 2021-07-04 RX ORDER — TRAZODONE HYDROCHLORIDE 50 MG/1
50 TABLET, FILM COATED ORAL AT BEDTIME
Status: DISCONTINUED | OUTPATIENT
Start: 2021-07-04 | End: 2021-07-07 | Stop reason: HOSPADM

## 2021-07-04 RX ORDER — AZITHROMYCIN 500 MG/5ML
500 INJECTION, POWDER, LYOPHILIZED, FOR SOLUTION INTRAVENOUS ONCE
Status: COMPLETED | OUTPATIENT
Start: 2021-07-04 | End: 2021-07-04

## 2021-07-04 RX ORDER — DESLORATADINE 5 MG/1
5 TABLET ORAL DAILY PRN
Status: ON HOLD | COMMUNITY
End: 2021-09-20

## 2021-07-04 RX ORDER — PREDNISONE 20 MG/1
40 TABLET ORAL DAILY
Status: DISCONTINUED | OUTPATIENT
Start: 2021-07-05 | End: 2021-07-07 | Stop reason: HOSPADM

## 2021-07-04 RX ORDER — IPRATROPIUM BROMIDE AND ALBUTEROL SULFATE 2.5; .5 MG/3ML; MG/3ML
3 SOLUTION RESPIRATORY (INHALATION)
Status: DISCONTINUED | OUTPATIENT
Start: 2021-07-04 | End: 2021-07-04

## 2021-07-04 RX ADMIN — CEFTRIAXONE 2 G: 2 INJECTION, POWDER, FOR SOLUTION INTRAMUSCULAR; INTRAVENOUS at 10:47

## 2021-07-04 RX ADMIN — IPRATROPIUM BROMIDE AND ALBUTEROL SULFATE 3 ML: .5; 3 SOLUTION RESPIRATORY (INHALATION) at 09:36

## 2021-07-04 RX ADMIN — FUROSEMIDE 20 MG: 10 INJECTION, SOLUTION INTRAMUSCULAR; INTRAVENOUS at 11:05

## 2021-07-04 RX ADMIN — ATORVASTATIN CALCIUM 20 MG: 20 TABLET, FILM COATED ORAL at 20:35

## 2021-07-04 RX ADMIN — IPRATROPIUM BROMIDE AND ALBUTEROL SULFATE 3 ML: .5; 3 SOLUTION RESPIRATORY (INHALATION) at 23:55

## 2021-07-04 RX ADMIN — TAZOBACTAM SODIUM AND PIPERACILLIN SODIUM 3.38 G: 375; 3 INJECTION, SOLUTION INTRAVENOUS at 13:34

## 2021-07-04 RX ADMIN — TRAZODONE HYDROCHLORIDE 50 MG: 50 TABLET ORAL at 22:15

## 2021-07-04 RX ADMIN — PREDNISONE 40 MG: 20 TABLET ORAL at 09:36

## 2021-07-04 RX ADMIN — AZITHROMYCIN MONOHYDRATE 500 MG: 500 INJECTION, POWDER, LYOPHILIZED, FOR SOLUTION INTRAVENOUS at 11:22

## 2021-07-04 RX ADMIN — TAZOBACTAM SODIUM AND PIPERACILLIN SODIUM 3.38 G: 375; 3 INJECTION, SOLUTION INTRAVENOUS at 20:34

## 2021-07-04 RX ADMIN — SERTRALINE HYDROCHLORIDE 50 MG: 50 TABLET ORAL at 22:15

## 2021-07-04 RX ADMIN — IPRATROPIUM BROMIDE AND ALBUTEROL SULFATE 3 ML: .5; 3 SOLUTION RESPIRATORY (INHALATION) at 19:31

## 2021-07-04 RX ADMIN — IPRATROPIUM BROMIDE AND ALBUTEROL SULFATE 3 ML: .5; 3 SOLUTION RESPIRATORY (INHALATION) at 15:57

## 2021-07-04 RX ADMIN — ASPIRIN 81 MG: 81 TABLET ORAL at 22:15

## 2021-07-04 RX ADMIN — ACETAMINOPHEN 650 MG: 325 TABLET, FILM COATED ORAL at 09:50

## 2021-07-04 RX ADMIN — LEVOTHYROXINE SODIUM 75 MCG: 0.07 TABLET ORAL at 13:42

## 2021-07-04 ASSESSMENT — ENCOUNTER SYMPTOMS
PALPITATIONS: 0
FATIGUE: 1
WHEEZING: 0
LIGHT-HEADEDNESS: 1
COUGH: 1
FEVER: 0
SHORTNESS OF BREATH: 1

## 2021-07-04 ASSESSMENT — ACTIVITIES OF DAILY LIVING (ADL)
DEPENDENT_IADLS:: CLEANING;SHOPPING;TRANSPORTATION
ADLS_ACUITY_SCORE: 20
ADLS_ACUITY_SCORE: 21

## 2021-07-04 NOTE — ED NOTES
Care Management Initial Consult    General Information  Assessment completed with: Patient, Met with Marie.  Her grandson Ab is with her but did not contribute to the assessment  Type of CM/SW Visit: Initial Assessment    Primary Care Provider verified and updated as needed: Yes   Readmission within the last 30 days: no previous admission in last 30 days         Advance Care Planning: Advance Care Planning Reviewed: present on chart(Sent email request to Keyonna Watson to validate POLST.)          Communication Assessment  Patient's communication style: spoken language (English or Bilingual)             Cognitive  Cognitive/Neuro/Behavioral: WDL                      Living Environment:   People in home: alone     Current living Arrangements: independent living facility      Able to return to prior arrangements: yes       Family/Social Support:  Care provided by: self  Provides care for: no one  Marital Status:   Children          Description of Support System: Supportive, Involved    Support Assessment: Adequate family and caregiver support    Current Resources:   Patient receiving home care services: No     Community Resources:    Equipment currently used at home: grab bar, toilet, grab bar, tub/shower, walker, rolling, other (see comments)(Nebulizer and oxygen concentrator and portable oxygen)  Supplies currently used at home: Oxygen Tubing/Supplies, Nebulizer tubing    Employment/Financial:  Employment Status:          Financial Concerns:             Lifestyle & Psychosocial Needs:  Lifestyle     Physical activity     Days per week: Not on file     Minutes per session: Not on file     Stress: Not at all     Social Needs     Financial resource strain: Not hard at all     Food insecurity     Worry: Never true     Inability: Never true     Transportation needs     Medical: No     Non-medical: No     Socioeconomic History     Marital status:      Spouse name: Not on file     Number of children: Not  on file     Years of education: Not on file     Highest education level: Not on file   Relationships     Social connections     Talks on phone: More than three times a week     Gets together: Not on file     Attends Mosque service: Not on file     Active member of club or organization: Not on file     Attends meetings of clubs or organizations: Not on file     Relationship status: Not on file     Intimate partner violence     Fear of current or ex partner: No     Emotionally abused: No     Physically abused: No     Forced sexual activity: No     Tobacco Use     Smoking status: Former Smoker     Types: Cigarettes     Quit date: 2/3/1996     Years since quittin.4     Smokeless tobacco: Never Used   Substance and Sexual Activity     Alcohol use: No     Alcohol/week: 0.0 standard drinks     Drug use: No     Sexual activity: Not Currently     Partners: Male       Functional Status:  Prior to admission patient needed assistance:   Dependent ADLs:: Independent  Dependent IADLs:: Cleaning, Shopping, Transportation       Mental Health Status:          Chemical Dependency Status:                Values/Beliefs:  Spiritual, Cultural Beliefs, Rastafarian Practices, Values that affect care:                 Additional Information:  Chart reviewed.  Met with patient and her grandson Ab and introduced myself and role of care coordinator and team.  She was here last in March and then discharged back to [facility].  Home care is no longer involved.  She is independent in her cares but she does have housekeeping.  Family provides transportation.  Otherwise she manages her home oxygen (Through [employer]) and also her nebs and medication and finances.      Her goal is to return home.  Family will provide transportation.    Sunita Cook RN Care Coordinator  Inpatient Care Coordination - Emergency Department  [facility]  [phone]

## 2021-07-04 NOTE — ED NOTES
Bed: ED08  Expected date: 7/4/21  Expected time: 8:19 AM  Means of arrival: Ambulance  Comments:  [id number]

## 2021-07-04 NOTE — H&P
[facility]    History and Physical  Hospitalist       Date of Admission:  7/4/2021    Assessment & Plan   Marie Kline is a 89 year old female with a past medical history of CLL, heart failure, bladder cancer, essential hypertension, hypothyroidism, COPD on 2 L oxygen via nasal cannula at baseline who presents with shortness of breath, productive cough over last day.      #Acute on chronic hypoxemic respiratory failure secondary to COPD exacerbation and PNA: Patient is on 2 L oxygen at baseline.  She was reportedly satting in the low to mid 80s in the ED on 2 L.  She is currently on 3-1/2 and saturating well.  She has noted shortness of breath that started the day prior to admission.  Endorses cough that is productive.  Some associated chest pressure with coughing.  Of note, patient was hospitalized back in March and did grow Pseudomonas in her sputum culture.  -We will treat with prednisone, scheduled duo nebs, albuterol as needed.  -We will continue Zosyn given history of Pseudomonas on sputum culture during last hospitalization.  Will repeat sputum culture.  -Maintain oxygenation greater at around 90% given COPD  -Gentle diuresis as below.     #Possible acute on chronic HFpEF: Patient had echo done back in March 2021 that showed EF of 60 to 65%.  There was some evidence of diastolic dysfunction.  She is on 20 mg Lasix daily.  Her BNP is a bit elevated chest x-ray shows probable fluid overload.  -She received 20 mg IV Lasix in the ED.  We will monitor urine output with I's and O's and daily weights.  -Possible redose of Lasix tomorrow pending I/O's.     #CLL: Follows with Dr. Ivory from [facility].  Last seen in early June of this year.  She has chronic elevation of her white count and hemoglobin and platelets are stable.  She does get IVIG every 3 months.  We will follow up with oncology as outpatient.    #HTN: Hold antihypertensives for now. BP well controlled.   #HL: Continue home  statin    DVT Prophylaxis: Pneumatic Compression Devices  Code Status: DNR / DNI. Consistent with prior admissions.   Dispo: Admit to inpatient.     Ildefonso Whatley MD    Primary Care Physician   Nehal Freeman    Chief Complaint   SOB    History is obtained from the patient, patient's chart discussed with ER physician    History of Present Illness   Marie Kline is a 89 year old female with a past medical history of CLL, heart failure, bladder cancer, essential hypertension, hypothyroidism, COPD on 2 L oxygen via nasal cannula at baseline who presents with shortness of breath    Marie notes that yesterday she had increased shortness of breath and some chest discomfort with exertion.  Chest pressure lasted from early afternoon into the evening.  She is also endorsed associated cough that has been productive of yellow sputum.  This is worse than her baseline.  She used her nebulizer and felt a bit better.  She uses 2 L of oxygen at home at baseline and she states that when she woke up the oxygen was knocked off and she was very short of breath this AM.  Reportedly did have a fever at her facility before she left.  No known sick contacts.  She denies any current chest pain or chest discomfort.    In the ED, patient with low-grade fever 100.1, nontachycardic and normotensive.  Saturating 95% on 3 L oxygen via nasal cannula.  CBC notable for chronic leukocytosis at 84.  Hemoglobin at baseline at 10.3 and platelets at baseline at 83.  BNP mildly elevated at 2400 which seems to be close to baseline.  EKG poorly nonischemic.  Troponin negative.  BMP showed chronic elevation of bicarb.  Chest x-ray showed left upper lobe opacity with interstitial opacities through both lungs.  She was given ceftriaxone, azithromycin, DuoNeb and prednisone.    Past Medical History    I have reviewed this patient's medical history and updated it with pertinent information if needed.   Past Medical History:   Diagnosis Date     Arthritis      Generalized     Bladder cancer (H)      Bladder cancer (H)      Cardiomyopathy (H)      CLL (chronic lymphocytic leukemia) (H)      Congestive heart failure (H) 10/24/2014    flash pulm edema ass w/Takotsubo     COPD (chronic obstructive pulmonary disease) (H)     noc O2     Hypertension      Hypothyroid      Mumps      Myocardial infarction (H) 10/2014    Takotsubo presentation w/mild CAD     Pulmonary edema cardiac cause (H) 10/08/2014    associated w/Takotsubo ACS     Tricuspid valve disorders, 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    TR 2-3+ per echo       Past Surgical History   I have reviewed this patient's surgical history and updated it with pertinent information if needed.  Past Surgical History:   Procedure Laterality Date     BIOPSY LYMPH NODE INGUINAL Right 10/23/2018    Procedure: excsional biopsy right inguinal lymph node;  Surgeon: Reji Connors MD;  Location:  OR     BLADDER SURGERY       CORONARY ANGIOGRAPHY ADULT ORDER  10/2014    minimal CAD     CV LEFT HEART CATH N/A 12/11/2018    Procedure: Left Heart Cath;  Surgeon: Sadiq Carrasco MD;  Location:  HEART CARDIAC CATH LAB     CYSTOSCOPY       CYSTOSCOPY, BIOPSY BLADDER, COMBINED N/A 2/9/2016    Procedure: COMBINED CYSTOSCOPY, BIOPSY BLADDER;  Surgeon: Kar Nuñez MD;  Location:  OR     CYSTOSCOPY, TRANSURETHRAL RESECTION (TUR) TUMOR BLADDER, COMBINED N/A 2/9/2016    Procedure: COMBINED CYSTOSCOPY, TRANSURETHRAL RESECTION (TUR) TUMOR BLADDER;  Surgeon: Kar Nuñez MD;  Location:  OR     ESOPHAGOSCOPY, GASTROSCOPY, DUODENOSCOPY (EGD), COMBINED N/A 6/22/2016    Procedure: COMBINED ESOPHAGOSCOPY, GASTROSCOPY, DUODENOSCOPY (EGD);  Surgeon: Freddie Diez MD;  Location:  GI     OPEN REDUCTION INTERNAL FIXATION HIP BIPOLAR Left 11/19/2018    Procedure: Left bipolar hemiarthroplasty;  Surgeon: Scar Reynolds MD;  Location:  OR       Prior to Admission Medications   Prior to Admission Medications    Prescriptions Last Dose Informant Patient Reported? Taking?   Immune Globulin, Human, (OCTAGAM) 5 GM/100ML SOLN   No No   Si mg/kg into the vein every 30 days    Hold this medication while in TCU-resume at discharge from TCU   Multiple Vitamins-Minerals (MULTIVITAMIN ADULT) CHEW   Yes No   Sig: Take 2 chew tab by mouth daily   albuterol (PROVENTIL HFA) 108 (90 Base) MCG/ACT inhaler   Yes No   Sig: Inhale 2 puffs into the lungs every 6 hours as needed for shortness of breath / dyspnea or wheezing   albuterol (PROVENTIL) (2.5 MG/3ML) 0.083% neb solution   No No   Sig: Take 1 vial (2.5 mg) by nebulization every 6 hours as needed for shortness of breath / dyspnea or wheezing   aspirin 81 MG EC tablet   Yes No   Sig: Take 81 mg by mouth daily   atorvastatin (LIPITOR) 20 MG tablet   No No   Sig: Take 1 tablet (20 mg) by mouth daily   desloratadine (CLARINEX) 5 MG tablet   No No   Sig: Take 1 tablet (5 mg) by mouth daily   ferrous sulfate (IRON) 325 (65 FE) MG tablet   Yes No   Sig: Take 325 mg by mouth daily (with breakfast)    fluticasone (FLONASE) 50 MCG/ACT nasal spray   No No   Sig: Spray 1 spray into both nostrils daily   furosemide (LASIX) 20 MG tablet   No No   Sig: Take 1 tablet (20 mg) by mouth daily   levothyroxine (SYNTHROID/LEVOTHROID) 75 MCG tablet   No No   Sig: Take 1 tablet (75 mcg) by mouth daily   metoprolol succinate ER (TOPROL-XL) 25 MG 24 hr tablet   No No   Sig: Take 1 tablet (25 mg) by mouth daily   mometasone-formoterol (DULERA) 200-5 MCG/ACT inhaler   Yes No   Sig: Inhale 2 puffs into the lungs 2 times daily   order for DME   Yes No   Sig: Equipment being ordered: Oxygen 2LNC continuous with portability tank due to mobility in the home.     Length of need is 99 months.   order for DME   No No   Sig: Equipment being ordered: Oxygen concentrator, O2-85% resting room air & 02-82% walking room air, Flow rate 2L   sertraline (ZOLOFT) 50 MG tablet   No No   Sig: Take 1 tablet (50 mg) by mouth  daily   traZODone (DESYREL) 50 MG tablet   Yes No   Sig: Take 50 mg by mouth At Bedtime      Facility-Administered Medications: None     Allergies   Allergies   Allergen Reactions     Diagnostic X-Ray Materials Hives     CT DYE     Contrast Dye Hives     CT DYE     Prolia [Denosumab]      Osteonecrosis jaw       Wound Dressing Adhesive        Social History   I have reviewed this patient's social history and updated it with pertinent information if needed. Marie Kline  reports that she quit smoking about 25 years ago. Her smoking use included cigarettes. She has never used smokeless tobacco. She reports that she does not drink alcohol or use drugs.    Family History   I have reviewed this patient's family history and updated it with pertinent information if needed.   Family History   Problem Relation Age of Onset     Cerebrovascular Disease Mother      Cancer Father        Review of Systems   The 10 point Review of Systems is negative other than noted in the HPI or here.     Physical Exam   Temp: 100.1  F (37.8  C) Temp src: Oral BP: 103/77 Pulse: 93   Resp: 16 SpO2: 95 % O2 Device: Nasal cannula Oxygen Delivery: 3 LPM  Vital Signs with Ranges  Temp:  [100.1  F (37.8  C)] 100.1  F (37.8  C)  Pulse:  [88-93] 93  Resp:  [16] 16  BP: (103-138)/(58-77) 103/77  SpO2:  [81 %-96 %] 95 %  0 lbs 0 oz    Constitutional: Thin, elderly female.  No acute distress.  Conversational.  Very pleasant  HEENT: Normocephalic, MMM, no elevation of JVD noted  Respiratory: No tachypnea.  No lung retractions.  Distant breath sounds with some expiratory wheezing noted bilaterally.  No significant rhonchi.  Cardiovascular: Regular, no murmur  GI: BS+, NT, ND, no rebound or guarding  Lymph/Hematologic: Scattered bruising  Skin: Venous stasis changes noted.  Scattered bruising.  Musculoskeletal: Extremities.  No edema  Neurologic: A&Ox3, Answers appropriately. CNII-XII intact. Moves all extremities. No tremor  Psychiatric: Calm    Data   Data  reviewed today:  I personally reviewed   Recent Labs   Lab 07/04/21  0940   WBC 84.4*   HGB 10.3*   MCV 97   PLT 83*      POTASSIUM 3.7   CHLORIDE 98   CO2 34*   BUN 13   CR 0.85   ANIONGAP 2*   CARLOS 8.8   *   TROPI 0.021       Recent Results (from the past 24 hour(s))   XR Chest Port 1 View    Narrative    XR CHEST PORT 1 VIEW 7/4/2021 9:54 AM    HISTORY: sob    COMPARISON: 4/26/2021 and 3/26/2021      Impression    IMPRESSION: Emphysema. New left upper lobe patchy opacity. Increased  interstitial opacities throughout the lungs. This could represent  pneumonia and/or pulmonary edema. A follow-up chest CT or radiograph  after treatment to ensure resolution is recommended. Linear scarring  in the left midlung has also increased. No pleural effusion or  pneumothorax. Normal heart size. Thoracic scoliosis.    BENJIE POSEY MD          SYSTEM ID:  [id number]       Risk Factors Present on Admission              # Platelet Defect: home medication list includes an antiplatelet medication

## 2021-07-04 NOTE — ED NOTES
[facility]  ED Nurse Handoff Report    Marie Kline is a 89 year old female   ED Chief complaint: Shortness of Breath  . ED Diagnosis:   Final diagnoses:   Community acquired pneumonia, unspecified laterality   COPD exacerbation (H)     Allergies:   Allergies   Allergen Reactions     Diagnostic X-Ray Materials Hives     CT DYE     Contrast Dye Hives     CT DYE     Prolia [Denosumab]      Osteonecrosis jaw       Wound Dressing Adhesive        Code Status: DNR / DNI  Activity level - Baseline/Home:  Independent. Activity Level - Current:   Assist X 1. Lift room needed: No. Bariatric: No   Needed: No   Isolation: Yes. Infection: Other       Vital Signs:   Vitals:    07/04/21 0945 07/04/21 0950 07/04/21 1000 07/04/21 1010   BP: 135/64  103/77    Pulse: 93  93    Resp:       Temp:       TempSrc:       SpO2: 92% (!) 81% 96% 95%       Cardiac Rhythm:  ,      Pain level:    Patient confused: No. Patient Falls Risk: Yes.   Elimination Status: Not in ED  Patient Report - Initial Complaint: SOB . Focused Assessment:  Marie Kline is a 89 year old O2 dependent female with history of COPD, CLL, smoking, MI, CHF, hypertension, hyperlipidemia and CAD who presents via EMS with shortness of breath. The patient reports yesterday she had shortness of breath and chest pain after a period of exertion. She reports the pain in her chest lasted from around 1500 through the night. She reports using her neb for the shortness of breath. She states that last night her oxygen fell out and she woke up short of breath. EMS states that her O2 was at 81% when they arrived and she was tachypneic. She reports that she wears 2 L of oxygen at home and that she has been on O2 for awhile. She denies any recent illness. She notes having diarrhea.         Review of Systems   Constitutional: Negative for fever.   Respiratory: Positive for shortness of breath.    Cardiovascular: Positive for chest pain.   All other systems reviewed  and are negative  Tests Performed: EKG, Labs, Imaging . Abnormal Results:   Labs Ordered and Resulted from Time of ED Arrival Up to the Time of Departure from the ED   CBC WITH PLATELETS DIFFERENTIAL - Abnormal; Notable for the following components:       Result Value    WBC 84.4 (*)     RBC Count 3.52 (*)     Hemoglobin 10.3 (*)     Hematocrit 34.1 (*)     MCHC 30.2 (*)     RDW 16.8 (*)     Platelet Count 83 (*)     All other components within normal limits   BASIC METABOLIC PANEL - Abnormal; Notable for the following components:    Carbon Dioxide 34 (*)     Anion Gap 2 (*)     Glucose 117 (*)     All other components within normal limits   NT PROBNP INPATIENT - Abnormal; Notable for the following components:    N-Terminal Pro BNP Inpatient 2,469 (*)     All other components within normal limits   TROPONIN I   SARS-COV-2 (COVID-19) VIRUS RT-PCR   PERIPHERAL IV CATHETER     XR Chest Port 1 View   Final Result   IMPRESSION: Emphysema. New left upper lobe patchy opacity. Increased   interstitial opacities throughout the lungs. This could represent   pneumonia and/or pulmonary edema. A follow-up chest CT or radiograph   after treatment to ensure resolution is recommended. Linear scarring   in the left midlung has also increased. No pleural effusion or   pneumothorax. Normal heart size. Thoracic scoliosis.      BENJIE OPSEY MD             SYSTEM ID:  [id number]        .   Treatments provided: See MAR   Family Comments: Grandson at bedside, daughter Margaret updated   OBS brochure/video discussed/provided to patient:  No  ED Medications:   Medications   cefTRIAXone (ROCEPHIN) 2 g vial to attach to  ml bag for ADULTS or NS 50 ml bag for PEDS (2 g Intravenous New Bag 7/4/21 3326)   azithromycin 500 mg (ZITHROMAX) in 0.9% NaCl 250 mL intermittent infusion 500 mg (has no administration in time range)   ipratropium - albuterol 0.5 mg/2.5 mg/3 mL (DUONEB) neb solution 3 mL (3 mLs Nebulization Given 7/4/21 8726)   predniSONE  (DELTASONE) tablet 40 mg (40 mg Oral Given 7/4/21 0936)   acetaminophen (TYLENOL) tablet 650 mg (650 mg Oral Given 7/4/21 0950)     Drips infusing:  Yes  For the majority of the shift, the patient's behavior Green. Interventions performed were N/A.    Sepsis treatment initiated: No     Patient tested for COVID 19 prior to admission: YES    ED Nurse Name/Phone Number: Zulay Vargas RN,   10:51 AM    RECEIVING UNIT ED HANDOFF REVIEW    Above ED Nurse Handoff Report was reviewed: Yes  Reviewed by: Jocelin Gastelum RN on July 4, 2021 at 11:26 AM

## 2021-07-04 NOTE — ED PROVIDER NOTES
History   Chief Complaint:  Shortness of Breath       HPI   Marie Kline is a 89 year old O2 dependent female with history of COPD, CLL, smoking, MI, CHF, hypertension, hyperlipidemia and CAD who presents via EMS with shortness of breath. The patient reports yesterday she had shortness of breath and chest pain after a period of exertion. She reports the pain in her chest lasted from around 1500 through the night. She reports using her neb for the shortness of breath. She states that last night her oxygen fell out and she woke up short of breath. EMS states that her O2 was at 81% when they arrived and she was tachypneic. She reports that she wears 2 L of oxygen at home and that she has been on O2 for awhile. She denies any recent illness. She notes having diarrhea.       Review of Systems   Constitutional: Positive for fatigue. Negative for fever.   HENT: Negative.    Respiratory: Positive for cough and shortness of breath. Negative for wheezing.    Cardiovascular: Positive for chest pain. Negative for palpitations and leg swelling.   Skin: Negative.    Neurological: Positive for light-headedness.   All other systems reviewed and are negative.    Allergies:  Diagnostic X-Ray Materials  Contrast Dye  Denosumab  Wound Dressing Adhesive    Medications:  Albuterol  Aspirin 81 mg  Lipitor  Clarinex  Iron  Flonase  Lasix  Levothyroxine  Toprol XL  Dulera  Zoloft  Desyrel      Past Medical History:    Arthritis   Bladder cancer  Cardiomyopathy   CLL  CHF  COPD  Hypertension  Hypothyroid  Mumps   MI  Pulmonary edema cardiac cause  Tricuspid valve disorders    Pulmonary nodules  CAD  Pelvic hematoma  Pneumonia  NSTEMI  Hypoxia  Neck fracture  Xerosis cutis  CKD  Osteoporosis  DDD  Anxiety   Hypothyroidism   Hyperlipidemia   Hypokalemia     Hyponatremia        Past Surgical History:    Bladder surgery  Biopsy lymph node inguinal  CV left heart cath  Coronary angiogram  Cystoscopy x 3  Bladder biopsy   Transurethral resection  tumor bladder  EGD  Open reduction internal fixation hip bipolar, left  Appendectomy   Hysterectomy     Family History:    Cancer  Stroke     Social History:  The patient presents with her grandson.  She lives in an assisted living home.    Physical Exam     Patient Vitals for the past 24 hrs:   BP Temp Temp src Pulse Resp SpO2   07/04/21 1053 -- 99.3  F (37.4  C) Oral -- -- --   07/04/21 1010 -- -- -- -- -- 95 %   07/04/21 1000 103/77 -- -- 93 -- 96 %   07/04/21 0950 -- -- -- -- -- (!) 81 %   07/04/21 0945 135/64 -- -- 93 -- 92 %   07/04/21 0940 -- -- -- -- -- 91 %   07/04/21 0935 -- -- -- -- -- 92 %   07/04/21 0930 128/58 -- -- 88 -- 93 %   07/04/21 0915 -- -- -- -- -- 91 %   07/04/21 0910 -- -- -- -- -- 92 %   07/04/21 0905 -- -- -- -- -- 93 %   07/04/21 0900 -- -- -- -- -- 91 %   07/04/21 0855 -- -- -- -- -- 93 %   07/04/21 0850 -- -- -- -- -- 92 %   07/04/21 0845 -- -- -- -- -- 93 %   07/04/21 0840 138/60 100.1  F (37.8  C) Oral 89 16 (!) 88 %       Physical Exam  General:  Thin, frail.  HENT: Nose normal. Moist oral mucosa.  Posterior pharynx normal without exudate or asymmetric swelling.  Eyes:  Normal conjunctiva.  No drainage  Neck: Nontender, normal mobility.  Cardiovascular: Regular rate and rhythm.   Pulmonary: speaking complete sentences, on 3L.  Distant breath sounds, expiratory wheezing, slightly prolonged expiratory phase.   Gastrointestinal:  Nontender.  No distension, no masses, no guarding.  Musculoskeletal: Normal appearance, normal range of motion.  Skin: warm, dry, no rashes, no bruising.  Neurologic: Interacting normally with caregiver.  Normal strength, sensation, and coordination.      Emergency Department Course   ECG  ECG taken at 0930, ECG read at 0942  Normal sinus rhythm   Septal infarct, age undetermined  Abnormal ECG   No significant change as compared to prior, dated 3/26/2021.  Rate 87 bpm. OH interval 200 ms. QRS duration 94 ms. QT/QTc 364/438 ms. P-R-T axes 83 70 66.      Imaging:  XR Chest Port 1 View:  Emphysema. New left upper lobe patchy opacity. Increased   interstitial opacities throughout the lungs. This could represent   pneumonia and/or pulmonary edema. A follow-up chest CT or radiograph   after treatment to ensure resolution is recommended. Linear scarring   in the left midlung has also increased. No pleural effusion or   pneumothorax. Normal heart size. Thoracic scoliosis. , as per radiology.       Laboratory:  CBC: WBC: 84.4 (HH), HGB: 10.3 (L), PLT: 83 (L)  BMP: Glucose 117 (H), Carbon Dioxide: 34 (H), Anion Gap: 2 (L), o/w WNL (Creatinine: 0.85)    Troponin (Collected 0940): 0.021  BNP: 2,469 (H)    Asymptomatic COVID-19 PCR: Negative      Emergency Department Course:    Reviewed:  I reviewed nursing notes, vitals, past medical history and care everywhere    Assessments:  0911 I obtained history and examined the patient as noted above.   1055 I rechecked the patient and explained findings. I explained the plan for admission to the patient and family.       Consults:   1049 I spoke with Dr. Whatley of the Hospitalist service, regarding patient's presentation, findings, and plan of care.       Interventions:  0936: Duoneb, 3 mL, nebulization  0936: Deltasone, 40 mg, PO  0950: Tylenol, 650 mg, PO  1047: Rocephin, 2 g, IV  1105: Lasix, 20 mg, IV    Disposition:  The patient was admitted to the hospital under the care of Dr. Whatley.       Impression & Plan     Medical Decision Making:  Afebrile 89-year-old female with oxygen dependent COPD has presented with shortness of breath, productive cough, and mild chest discomfort as detailed above.  Evaluation has included an EKG depicting a sinus rhythm with no acute ischemic changes.  Chest x-ray shows findings of emphysema with a left upper lobe opacification.  This could represent pneumonia versus pulmonary edema.  Her troponin is within normal limits.  BNP is 2469.  White blood cell count is 84.4.  She does have known CLL on top  of her COPD history of CHF and cardiomyopathy.  Covid testing is negative.  Blood cultures were drawn she was medicated with a DuoNeb and oral prednisone along with Tylenol after initial assessment.  Following test results, she was treated with IV ceftriaxone and azithromycin along with 20 mg of Lasix and obviously requires admission for further management.  Her dyspnea and chest discomfort is likely multifactorial and related to mild congestive heart failure COPD exacerbation possible community-acquired pneumonia.  She will be admitted to telemetry bed under the hospitalist service for further management.    Covid-19  Marie Kline was evaluated during a global COVID-19 pandemic, which necessitated consideration that the patient might be at risk for infection with the SARS-CoV-2 virus that causes COVID-19.   Applicable protocols for evaluation were followed during the patient's care.   COVID-19 was considered as part of the patient's evaluation. The plan for testing is:  a test was obtained during this visit.      Diagnosis:    ICD-10-CM    1. Community acquired pneumonia, unspecified laterality  J18.9    2. COPD exacerbation (H)  J44.1          Scribe Disclosure:  Solo HANKS, am serving as a scribe at 9:09 AM on 7/4/2021 to document services personally performed by Jacinto Easton MD based on my observations and the provider's statements to me.        Jacinto Easton MD  07/04/21 [id number]

## 2021-07-04 NOTE — ED NOTES
DATE:  7/4/2021   TIME OF RECEIPT FROM LAB:  1015  LAB TEST:  WBC  LAB VALUE:  1015  RESULTS GIVEN WITH READ-BACK TO (PROVIDER):  Jemma  TIME LAB VALUE REPORTED TO PROVIDER:   1015

## 2021-07-04 NOTE — PHARMACY-ADMISSION MEDICATION HISTORY
Admission medication history interview status for this patient is complete. See [facility] admission navigator for allergy information, prior to admission medications and immunization status.     Medication history interview done, indicate source(s): Patient  Medication history resources (including written lists, pill bottles, clinic record): SureScripts and Care Everywhere  Pharmacy: [facility].    Changes made to PTA medication list:  Added: imodium  Deleted: none  Changed: desloratadine 5mg daily --> daily prn. Atorvastatin daily --> at bedtime. Zoloft daily --> QHS    Actions taken by pharmacist (provider contacted, etc): Spoke with patient about home med list.      Additional medication history information: Pt has been using Flonase daily for months for runny nose but it is not helping. Pt has had black diarrhea for the past month and has been taking loperamide nearly daily without relief.     Medication reconciliation/reorder completed by provider prior to medication history?  Y   (Y/N)       Prior to Admission medications    Medication Sig Last Dose Taking? Auth Provider   albuterol (PROVENTIL HFA) 108 (90 Base) MCG/ACT inhaler Inhale 2 puffs into the lungs every 6 hours as needed for shortness of breath / dyspnea or wheezing Past Month at Unknown time Yes    albuterol (PROVENTIL) (2.5 MG/3ML) 0.083% neb solution Take 1 vial (2.5 mg) by nebulization every 6 hours as needed for shortness of breath / dyspnea or wheezing 7/3/2021 at pm Yes Piper Kiser MD   aspirin 81 MG EC tablet Take 81 mg by mouth daily 7/3/2021 at am Yes Unknown, Entered By History   atorvastatin (LIPITOR) 20 MG tablet Take 1 tablet (20 mg) by mouth daily  Patient taking differently: Take 20 mg by mouth At Bedtime  7/3/2021 at pm Yes Piper Kiser MD   desloratadine (CLARINEX) 5 MG tablet Take 1 tablet (5 mg) by mouth daily  Patient taking differently: Take 5 mg by mouth daily as needed  Past Week at Unknown time Yes  Piper Kiser MD   ferrous sulfate (IRON) 325 (65 FE) MG tablet Take 325 mg by mouth daily (with breakfast)  7/3/2021 at am Yes Reported, Patient   fluticasone (FLONASE) 50 MCG/ACT nasal spray Spray 1 spray into both nostrils daily 7/3/2021 at am Yes Piper Kiser MD   levothyroxine (SYNTHROID/LEVOTHROID) 75 MCG tablet Take 1 tablet (75 mcg) by mouth daily 7/3/2021 at am Yes Piper Kiser MD   loperamide (IMODIUM) 2 MG capsule Take 2 mg by mouth daily as needed for diarrhea 7/3/2021 at am Yes Unknown, Entered By History   metoprolol succinate ER (TOPROL-XL) 25 MG 24 hr tablet Take 1 tablet (25 mg) by mouth daily 7/3/2021 at am Yes Piper Kiser MD   mometasone-formoterol (DULERA) 200-5 MCG/ACT inhaler Inhale 2 puffs into the lungs 2 times daily 7/3/2021 at pm Yes Reported, Patient   Multiple Vitamins-Minerals (MULTIVITAMIN ADULT) CHEW Take 2 chew tab by mouth daily 7/3/2021 at am Yes Reported, Patient   sertraline (ZOLOFT) 50 MG tablet Take 1 tablet (50 mg) by mouth daily  Patient taking differently: Take 50 mg by mouth At Bedtime  7/3/2021 at pm Yes Piper Kiser MD   traZODone (DESYREL) 50 MG tablet Take 50 mg by mouth At Bedtime 7/3/2021 at pm Yes Unknown, Entered By History   furosemide (LASIX) 20 MG tablet Take 1 tablet (20 mg) by mouth daily   Piper Kiser MD   Immune Globulin, Human, (OCTAGAM) 5 GM/100ML SOLN 300 mg/kg into the vein every 30 days    Hold this medication while in TCU-resume at discharge from TCU   Fide Wilkes MD   order for DME Equipment being ordered: Oxygen concentrator, O2-85% resting room air & 02-82% walking room air, Flow rate 2L   Dillan Tang MD   order for DME Equipment being ordered: Oxygen 2LNC continuous with portability tank due to mobility in the home.     Length of need is 99 months.   Sadie Youngblood APRN CNP

## 2021-07-04 NOTE — PROGRESS NOTES
"[facility] RCAT     Date: 7/4/2021  Admission Dx: COPD  Pulmonary History: COPD  Home Nebulizer/MDI Use: Albuterol, Dulera  Home Oxygen: 2 Lpm NC  Acuity Level (RCAT flow sheet): 2  Aerosol Therapy initiated: Duoneb increased to Q4 hours w/a, continue albuterol Q2 hours prn.      Pulmonary Hygiene initiated: Deep breath and cough QID      Volume Expansion initiated: IS QID      Current Oxygen Requirements: 3 Lpm NC  Current SpO2: 94%    Re-evaluation date: 7/7/2021    Patient Education: Informed Pt of RCAT.  She did not want to be woken up over night.  She is aware that she can have a nebulizer over night but will need to ask for it.      See \"RT Assessments\" flow sheet for patient assessment scoring and Acuity Level Details.             "

## 2021-07-04 NOTE — ED TRIAGE NOTES
Pt arrives via EMS, EMS reports that pt has a hx of COPD and chronically wears O2 at home on 2L, pt reports that O2 tubing fell out of nose last night and woke up in distress and called granddaughter who called EMS. EMS reports that pt was at 81% upon arrival and tachypneic. PT VSS and ABC's intact.

## 2021-07-04 NOTE — ED NOTES
Updated pt daughter Margaret per pt and grandson request, PT resting in bed at this time with grandson at bedside

## 2021-07-04 NOTE — ED NOTES
Pt sitting up in bed for X-ray, pt began coughing and having large amounts of productive sputum. Pt desatted with baseline O2 of 2L, RN turned O2 up to 3L nasal canula. Pt above 90% with 3L NC. MD notified and will continue to monitor

## 2021-07-05 ENCOUNTER — PATIENT OUTREACH (OUTPATIENT)
Dept: CARE COORDINATION | Facility: CLINIC | Age: 86
End: 2021-07-05

## 2021-07-05 LAB
ANION GAP SERPL CALCULATED.3IONS-SCNC: 3 MMOL/L (ref 3–14)
BUN SERPL-MCNC: 18 MG/DL (ref 7–30)
CALCIUM SERPL-MCNC: 8.9 MG/DL (ref 8.5–10.1)
CHLORIDE SERPL-SCNC: 95 MMOL/L (ref 94–109)
CO2 SERPL-SCNC: 34 MMOL/L (ref 20–32)
CREAT SERPL-MCNC: 0.89 MG/DL (ref 0.52–1.04)
ERYTHROCYTE [DISTWIDTH] IN BLOOD BY AUTOMATED COUNT: 16.5 % (ref 10–15)
GFR SERPL CREATININE-BSD FRML MDRD: 57 ML/MIN/{1.73_M2}
GLUCOSE SERPL-MCNC: 98 MG/DL (ref 70–99)
GRAM STN SPEC: NORMAL
HCT VFR BLD AUTO: 32.4 % (ref 35–47)
HGB BLD-MCNC: 9.8 G/DL (ref 11.7–15.7)
INTERPRETATION ECG - MUSE: NORMAL
Lab: NORMAL
MCH RBC QN AUTO: 29.2 PG (ref 26.5–33)
MCHC RBC AUTO-ENTMCNC: 30.2 G/DL (ref 31.5–36.5)
MCV RBC AUTO: 96 FL (ref 78–100)
PLATELET # BLD AUTO: 94 10E9/L (ref 150–450)
POTASSIUM SERPL-SCNC: 3.4 MMOL/L (ref 3.4–5.3)
RBC # BLD AUTO: 3.36 10E12/L (ref 3.8–5.2)
SODIUM SERPL-SCNC: 132 MMOL/L (ref 133–144)
SPECIMEN SOURCE: NORMAL
WBC # BLD AUTO: 97.4 10E9/L (ref 4–11)

## 2021-07-05 PROCEDURE — 36415 COLL VENOUS BLD VENIPUNCTURE: CPT | Performed by: HOSPITALIST

## 2021-07-05 PROCEDURE — 94640 AIRWAY INHALATION TREATMENT: CPT

## 2021-07-05 PROCEDURE — 250N000009 HC RX 250: Performed by: HOSPITALIST

## 2021-07-05 PROCEDURE — 99232 SBSQ HOSP IP/OBS MODERATE 35: CPT | Performed by: HOSPITALIST

## 2021-07-05 PROCEDURE — 999N000157 HC STATISTIC RCP TIME EA 10 MIN

## 2021-07-05 PROCEDURE — 250N000012 HC RX MED GY IP 250 OP 636 PS 637: Performed by: HOSPITALIST

## 2021-07-05 PROCEDURE — 87070 CULTURE OTHR SPECIMN AEROBIC: CPT | Performed by: HOSPITALIST

## 2021-07-05 PROCEDURE — 94640 AIRWAY INHALATION TREATMENT: CPT | Mod: 76

## 2021-07-05 PROCEDURE — 250N000011 HC RX IP 250 OP 636: Performed by: HOSPITALIST

## 2021-07-05 PROCEDURE — 120N000001 HC R&B MED SURG/OB

## 2021-07-05 PROCEDURE — 80048 BASIC METABOLIC PNL TOTAL CA: CPT | Performed by: HOSPITALIST

## 2021-07-05 PROCEDURE — 250N000013 HC RX MED GY IP 250 OP 250 PS 637: Performed by: HOSPITALIST

## 2021-07-05 PROCEDURE — 85027 COMPLETE CBC AUTOMATED: CPT | Performed by: HOSPITALIST

## 2021-07-05 PROCEDURE — 87205 SMEAR GRAM STAIN: CPT | Performed by: HOSPITALIST

## 2021-07-05 RX ADMIN — ASPIRIN 81 MG: 81 TABLET ORAL at 22:03

## 2021-07-05 RX ADMIN — ATORVASTATIN CALCIUM 20 MG: 20 TABLET, FILM COATED ORAL at 19:54

## 2021-07-05 RX ADMIN — TAZOBACTAM SODIUM AND PIPERACILLIN SODIUM 3.38 G: 375; 3 INJECTION, SOLUTION INTRAVENOUS at 22:17

## 2021-07-05 RX ADMIN — IPRATROPIUM BROMIDE AND ALBUTEROL SULFATE 3 ML: .5; 3 SOLUTION RESPIRATORY (INHALATION) at 07:25

## 2021-07-05 RX ADMIN — TAZOBACTAM SODIUM AND PIPERACILLIN SODIUM 3.38 G: 375; 3 INJECTION, SOLUTION INTRAVENOUS at 01:46

## 2021-07-05 RX ADMIN — TAZOBACTAM SODIUM AND PIPERACILLIN SODIUM 3.38 G: 375; 3 INJECTION, SOLUTION INTRAVENOUS at 10:44

## 2021-07-05 RX ADMIN — SERTRALINE HYDROCHLORIDE 50 MG: 50 TABLET ORAL at 22:03

## 2021-07-05 RX ADMIN — TRAZODONE HYDROCHLORIDE 50 MG: 50 TABLET ORAL at 23:02

## 2021-07-05 RX ADMIN — Medication 1 MG: at 00:31

## 2021-07-05 RX ADMIN — PREDNISONE 40 MG: 20 TABLET ORAL at 09:16

## 2021-07-05 RX ADMIN — LEVOTHYROXINE SODIUM 75 MCG: 0.07 TABLET ORAL at 09:16

## 2021-07-05 RX ADMIN — ACETAMINOPHEN 650 MG: 325 TABLET, FILM COATED ORAL at 00:31

## 2021-07-05 RX ADMIN — IPRATROPIUM BROMIDE AND ALBUTEROL SULFATE 3 ML: .5; 3 SOLUTION RESPIRATORY (INHALATION) at 15:09

## 2021-07-05 RX ADMIN — TAZOBACTAM SODIUM AND PIPERACILLIN SODIUM 3.38 G: 375; 3 INJECTION, SOLUTION INTRAVENOUS at 17:37

## 2021-07-05 RX ADMIN — IPRATROPIUM BROMIDE AND ALBUTEROL SULFATE 3 ML: .5; 3 SOLUTION RESPIRATORY (INHALATION) at 12:35

## 2021-07-05 RX ADMIN — IPRATROPIUM BROMIDE AND ALBUTEROL SULFATE 3 ML: .5; 3 SOLUTION RESPIRATORY (INHALATION) at 20:03

## 2021-07-05 ASSESSMENT — ACTIVITIES OF DAILY LIVING (ADL)
ADLS_ACUITY_SCORE: 20
ADLS_ACUITY_SCORE: 18
ADLS_ACUITY_SCORE: 20

## 2021-07-05 ASSESSMENT — MIFFLIN-ST. JEOR: SCORE: 726.17

## 2021-07-05 NOTE — PLAN OF CARE
To Do:  End of Shift Summary  For vital signs and complete assessments, please see documentation flowsheets.     Pertinent assessments: Pt is Aox4, forgetful at times. Denies having any pain. Complains of being sore in between ribs with coughing, pain medication offered but refused. Scattered bruising and scabs on all extremities. Active bowel sounds, incontinent at times. Diarrhea x3. Complains of BENJAMIN/SOB. On 3 liters O2 via NC, expiratory wheeze heard.      Major Shift Events: Admission     Treatment Plan: Attempt to wean patients O2 back to baseline. Prednisone, Zosyn and diuretics     Bedside Nurse: Jocelin Gastelum RN

## 2021-07-05 NOTE — PROGRESS NOTES
Clinic Care Coordination Contact  Ambulatory Care Coordination to Inpatient Care Management   Hand-In Communication    Date:  July 5, 2021  Name: Marie Kline is enrolled in Ambulatory Care Coordination program and I am the Lead Care Coordinator.  CC Contact Information: Epic InTapastreetsket + phone  Payor Source: Payor: MEDICARE / Plan: MEDICARE / Product Type: Medicare /   Current services in place:     Please see the CC Snapshot and Care Management Flowsheets for specific  details of this Marie Kline care plan.   Additional details/specific concerns r/t this admission:    Goals of Care Wound care.     I will follow this admission in Epic. Please feel free to contact me with questions or for further collaboration in discharge planning.      YESSI Vela  Clinic Care Coordinator  Ph. [phone]  [email or web address]

## 2021-07-05 NOTE — PROGRESS NOTES
[facility]    Hospitalist Progress Note             Date of Admission:  7/4/2021                   Day of hospitalization: 1    Assessment and Plan:   Marie Kline is a 89 year old female with a past medical history of CLL, heart failure, bladder cancer, essential hypertension, hypothyroidism, COPD on 2 L oxygen via nasal cannula at baseline who presents with shortness of breath, productive cough over last day.       #Acute on chronic hypoxemic respiratory failure secondary to COPD exacerbation and PNA: Patient is on 2 L oxygen at baseline.  She was reportedly satting in the low to mid 80s in the ED on 2 L.  She is currently on 3-1/2 and saturating well.  She has noted shortness of breath that started the day prior to admission.  Endorses cough that is productive.  Some associated chest pressure with coughing.  Of note, patient was hospitalized back in March and did grow Pseudomonas in her sputum culture.  -We will treat with prednisone, scheduled duo nebs, albuterol as needed.  -We will continue Zosyn given history of Pseudomonas on sputum culture during last hospitalization.  Will repeat sputum culture.  -Maintain oxygenation greater at around 90% given COPD       #Possible acute on chronic HFpEF: Patient had echo done back in March 2021 that showed EF of 60 to 65%.  There was some evidence of diastolic dysfunction.  She is on 20 mg Lasix daily.  Her BNP is a bit elevated chest x-ray shows probable fluid overload.  -She received 20 mg IV Lasix in the ED.  We will monitor urine output with I's and O's and daily weights.  -Initially there was concern for possible mild acute heart failure however suspicion for low she actually appears intravascularly depleted will hold off further diuresis     #CLL: Follows with Dr. Ivory from [place] [facility].  Last seen in early June of this year.  She has chronic elevation of her white count and hemoglobin and platelets are stable.  She does get IVIG every  "3 months.  We will follow up with oncology as outpatient.     #HTN: Hold antihypertensives for now. BP well controlled.   #HL: Continue home statin     DVT Prophylaxis: Pneumatic Compression Devices  Code Status: DNR / DNI. Consistent with prior admissions.   Dispo: Admit to inpatient.                Itzel Villanueva MD  Text Page (7am - 6pm, M-F)               Subjective   Chief Complaint: Shortness of breath  Subjective: Has dry hacking cough shortness of breath present but improving she says she is unsteady when she is walking around has multiple bruises in her lower extremity.  Otherwise no fever.  No further complaints     Objective   BP 94/43 (BP Location: Right arm)   Pulse 61   Temp 98.8  F (37.1  C) (Oral)   Resp 20   Ht 1.499 m (4' 11\")   Wt 39.6 kg (87 lb 3.2 oz)   LMP  (LMP Unknown)   SpO2 96%   BMI 17.61 kg/m       Physical Exam  General: Pt in NAD, normal appearance  HEENT: OP clear MMM, no JVD  Lungs: Clear to Auscultation Bilateral, normal breathing  without accessory muscle usage, no wheezing, rhonchi or crackles  Cardiac: +S1, S2, RRR, no MRG, no edema  Abdominal: normal bowel sounds, NT/ND, no hepatosplenomegaly  Skin: warm, dry, normal turgor, no rash  Psyche: A& O x3, appropriate affect             Intake/Output Summary (Last 24 hours) at 7/5/2021 1202  Last data filed at 7/5/2021 0915  Gross per 24 hour   Intake 120 ml   Output --   Net 120 ml           Labs and Imaging Results:      Recent Labs   Lab 07/05/21  0659 07/04/21  0940   WBC 97.4* 84.4*   HGB 9.8* 10.3*   PLT 94* 83*        Recent Labs   Lab 07/05/21  0659 07/04/21  0940   * 134   CO2 34* 34*   BUN 18 13      No results for input(s): INR, PTT in the last 168 hours.   No results for input(s): CKMB in the last 168 hours.    Invalid input(s): TROPONINT   No results for input(s): ALBUMIN, AST, ALT, ALKPHOS, BILITOT in the last 168 hours.     Micro:     Radio:  XR Chest Port 1 View   Final Result   IMPRESSION: Emphysema. New " left upper lobe patchy opacity. Increased   interstitial opacities throughout the lungs. This could represent   pneumonia and/or pulmonary edema. A follow-up chest CT or radiograph   after treatment to ensure resolution is recommended. Linear scarring   in the left midlung has also increased. No pleural effusion or   pneumothorax. Normal heart size. Thoracic scoliosis.      BENJIE POSEY MD             SYSTEM ID:  [id number]              Medications:      Scheduled Meds:      aspirin  81 mg Oral Daily     atorvastatin  20 mg Oral QPM     fluticasone  1 spray Both Nostrils Daily     ipratropium - albuterol 0.5 mg/2.5 mg/3 mL  3 mL Nebulization Q4H While awake     levothyroxine  75 mcg Oral Daily     piperacillin-tazobactam  3.375 g Intravenous Q6H     predniSONE  40 mg Oral Daily     sertraline  50 mg Oral At Bedtime     sodium chloride (PF)  3 mL Intracatheter Q8H     traZODone  50 mg Oral At Bedtime     Continuous Infusions:    PRN Meds:  acetaminophen, albuterol, lidocaine 4%, lidocaine (buffered or not buffered), melatonin, ondansetron **OR** ondansetron, sodium chloride (PF)

## 2021-07-05 NOTE — PLAN OF CARE
End of Shift Summary  For vital signs and complete assessments, please see documentation flowsheets.     Pertinent assessments: VSS 2L O2. Oriented but forgetful, repeated requests. BENJAMIN, productive cough. Inspiratory wheezing. Blanchable redness to bottom and spine. Encouraged repositioning.  Prn tylenol and melatonin, effective.    Major Shift Events: uneventful    Treatment Plan: O2 back to 2L (baseline). Prednisone, Zosyn and diuretics

## 2021-07-05 NOTE — PLAN OF CARE
End of Shift Summary  For vital signs and complete assessments, please see documentation flowsheets.     Pertinent assessments: VSS, alert and oriented x4.  Denied pain. Expiratory wheezes noted bilaterally. Productive cough.  Sputum clear but thick. Diarrhea dark in color. Back to baseline 2L of O2.       Major Shift Events: Pt had pain with NaCl flush, so peripheral discontinued and new line obtained.  Repeat EKG done. Sputum culture recollected.    Treatment Plan: Continue with IV abx and PO steroids.

## 2021-07-05 NOTE — PROGRESS NOTES
SPIRITUAL HEALTH SERVICES Progress Note  [facility]    Visited pt Marie per Spiritual Health Consult Order. Informed by nurse that pt missed Jew yesterday and requested spiritual care. Entered room just after Marie's grandson, Rico, and his girlfriend, Sanjana, arrived. Oriented pt and family to Spiritual Health Services, Marie welcomed reflective conversation and shared her spiritual history.    Pt is Taoist and attends a chapel service at her assisted living home on Tuesdays. Shared a strong reinier that has helped her through challenging experiences in her life, including the death of her parents and two husbands. Expressed gratitude for her time with them over sadness at their passing. Shared alongside her grandson how funny her second  was.    Marie takes comfort in prayer and requested a prayer from this author. Prayed for Marie's recovery and family. Pt can request a follow-up visit for prayer or conversation at any time, and this author will follow-up this week if Marie remains at [facility].    Freddie Charles   Intern  Pager [phone]

## 2021-07-06 ENCOUNTER — APPOINTMENT (OUTPATIENT)
Dept: PHYSICAL THERAPY | Facility: CLINIC | Age: 86
DRG: 177 | End: 2021-07-06
Attending: HOSPITALIST
Payer: MEDICARE

## 2021-07-06 ENCOUNTER — DOCUMENTATION ONLY (OUTPATIENT)
Dept: OTHER | Facility: CLINIC | Age: 86
End: 2021-07-06

## 2021-07-06 LAB
ANION GAP SERPL CALCULATED.3IONS-SCNC: 3 MMOL/L (ref 3–14)
BUN SERPL-MCNC: 15 MG/DL (ref 7–30)
CALCIUM SERPL-MCNC: 9 MG/DL (ref 8.5–10.1)
CHLORIDE SERPL-SCNC: 98 MMOL/L (ref 94–109)
CO2 SERPL-SCNC: 33 MMOL/L (ref 20–32)
CREAT SERPL-MCNC: 0.83 MG/DL (ref 0.52–1.04)
ERYTHROCYTE [DISTWIDTH] IN BLOOD BY AUTOMATED COUNT: 16.3 % (ref 10–15)
GFR SERPL CREATININE-BSD FRML MDRD: 63 ML/MIN/{1.73_M2}
GLUCOSE SERPL-MCNC: 108 MG/DL (ref 70–99)
HCT VFR BLD AUTO: 28.4 % (ref 35–47)
HGB BLD-MCNC: 8.9 G/DL (ref 11.7–15.7)
MCH RBC QN AUTO: 30.4 PG (ref 26.5–33)
MCHC RBC AUTO-ENTMCNC: 31.3 G/DL (ref 31.5–36.5)
MCV RBC AUTO: 97 FL (ref 78–100)
PLATELET # BLD AUTO: 68 10E9/L (ref 150–450)
POTASSIUM SERPL-SCNC: 3 MMOL/L (ref 3.4–5.3)
POTASSIUM SERPL-SCNC: 3.9 MMOL/L (ref 3.4–5.3)
RBC # BLD AUTO: 2.93 10E12/L (ref 3.8–5.2)
SODIUM SERPL-SCNC: 134 MMOL/L (ref 133–144)
WBC # BLD AUTO: 54.6 10E9/L (ref 4–11)

## 2021-07-06 PROCEDURE — 250N000009 HC RX 250: Performed by: HOSPITALIST

## 2021-07-06 PROCEDURE — 97161 PT EVAL LOW COMPLEX 20 MIN: CPT | Mod: GP | Performed by: PHYSICAL THERAPIST

## 2021-07-06 PROCEDURE — 97116 GAIT TRAINING THERAPY: CPT | Mod: GP | Performed by: PHYSICAL THERAPIST

## 2021-07-06 PROCEDURE — 999N000157 HC STATISTIC RCP TIME EA 10 MIN

## 2021-07-06 PROCEDURE — 94640 AIRWAY INHALATION TREATMENT: CPT

## 2021-07-06 PROCEDURE — 250N000011 HC RX IP 250 OP 636: Performed by: HOSPITALIST

## 2021-07-06 PROCEDURE — 84132 ASSAY OF SERUM POTASSIUM: CPT | Performed by: HOSPITALIST

## 2021-07-06 PROCEDURE — 99232 SBSQ HOSP IP/OBS MODERATE 35: CPT | Performed by: HOSPITALIST

## 2021-07-06 PROCEDURE — 250N000013 HC RX MED GY IP 250 OP 250 PS 637: Performed by: HOSPITALIST

## 2021-07-06 PROCEDURE — 97530 THERAPEUTIC ACTIVITIES: CPT | Mod: GP | Performed by: PHYSICAL THERAPIST

## 2021-07-06 PROCEDURE — 120N000001 HC R&B MED SURG/OB

## 2021-07-06 PROCEDURE — 250N000012 HC RX MED GY IP 250 OP 636 PS 637: Performed by: HOSPITALIST

## 2021-07-06 PROCEDURE — 94640 AIRWAY INHALATION TREATMENT: CPT | Mod: 76

## 2021-07-06 PROCEDURE — 36415 COLL VENOUS BLD VENIPUNCTURE: CPT | Performed by: HOSPITALIST

## 2021-07-06 PROCEDURE — 80048 BASIC METABOLIC PNL TOTAL CA: CPT | Performed by: HOSPITALIST

## 2021-07-06 PROCEDURE — 85027 COMPLETE CBC AUTOMATED: CPT | Performed by: HOSPITALIST

## 2021-07-06 RX ORDER — PREDNISONE 20 MG/1
40 TABLET ORAL DAILY
Qty: 10 TABLET | Refills: 0 | Status: SHIPPED | OUTPATIENT
Start: 2021-07-06 | End: 2021-07-07

## 2021-07-06 RX ORDER — LEVOFLOXACIN 500 MG/1
500 TABLET, FILM COATED ORAL DAILY
Qty: 4 TABLET | Refills: 0 | Status: SHIPPED | OUTPATIENT
Start: 2021-07-06 | End: 2021-07-07

## 2021-07-06 RX ORDER — LEVOFLOXACIN 250 MG/1
750 TABLET, FILM COATED ORAL EVERY OTHER DAY
Status: DISCONTINUED | OUTPATIENT
Start: 2021-07-06 | End: 2021-07-07 | Stop reason: HOSPADM

## 2021-07-06 RX ORDER — POTASSIUM CHLORIDE 1500 MG/1
40 TABLET, EXTENDED RELEASE ORAL ONCE
Status: COMPLETED | OUTPATIENT
Start: 2021-07-06 | End: 2021-07-06

## 2021-07-06 RX ADMIN — IPRATROPIUM BROMIDE AND ALBUTEROL SULFATE 3 ML: .5; 3 SOLUTION RESPIRATORY (INHALATION) at 07:45

## 2021-07-06 RX ADMIN — ASPIRIN 81 MG: 81 TABLET ORAL at 21:58

## 2021-07-06 RX ADMIN — LEVOFLOXACIN 750 MG: 250 TABLET, FILM COATED ORAL at 10:33

## 2021-07-06 RX ADMIN — IPRATROPIUM BROMIDE AND ALBUTEROL SULFATE 3 ML: .5; 3 SOLUTION RESPIRATORY (INHALATION) at 20:07

## 2021-07-06 RX ADMIN — SERTRALINE HYDROCHLORIDE 50 MG: 50 TABLET ORAL at 21:58

## 2021-07-06 RX ADMIN — LEVOTHYROXINE SODIUM 75 MCG: 0.07 TABLET ORAL at 09:27

## 2021-07-06 RX ADMIN — POTASSIUM CHLORIDE 40 MEQ: 1500 TABLET, EXTENDED RELEASE ORAL at 10:33

## 2021-07-06 RX ADMIN — TAZOBACTAM SODIUM AND PIPERACILLIN SODIUM 3.38 G: 375; 3 INJECTION, SOLUTION INTRAVENOUS at 04:01

## 2021-07-06 RX ADMIN — ATORVASTATIN CALCIUM 20 MG: 20 TABLET, FILM COATED ORAL at 20:32

## 2021-07-06 RX ADMIN — IPRATROPIUM BROMIDE AND ALBUTEROL SULFATE 3 ML: .5; 3 SOLUTION RESPIRATORY (INHALATION) at 15:51

## 2021-07-06 RX ADMIN — IPRATROPIUM BROMIDE AND ALBUTEROL SULFATE 3 ML: .5; 3 SOLUTION RESPIRATORY (INHALATION) at 11:23

## 2021-07-06 RX ADMIN — TRAZODONE HYDROCHLORIDE 50 MG: 50 TABLET ORAL at 21:58

## 2021-07-06 RX ADMIN — PREDNISONE 40 MG: 20 TABLET ORAL at 09:27

## 2021-07-06 ASSESSMENT — ACTIVITIES OF DAILY LIVING (ADL)
ADLS_ACUITY_SCORE: 18
ADLS_ACUITY_SCORE: 18
ADLS_ACUITY_SCORE: 19
ADLS_ACUITY_SCORE: 18
ADLS_ACUITY_SCORE: 19
ADLS_ACUITY_SCORE: 19

## 2021-07-06 ASSESSMENT — MIFFLIN-ST. JEOR: SCORE: 734.78

## 2021-07-06 NOTE — PLAN OF CARE
To Do:  End of Shift Summary  For vital signs and complete assessments, please see documentation flowsheets.     Pertinent assessments: Patient Aox4 this shift. Denies pain, nausea or SOB. BENJAMIN, with expiratory wheeze noted in all fields. Good PO intake, ambulating without difficulties.     Major Shift Events: Uneventful     Treatment Plan: Continue with IV Abx, PO steroids and monitoring of patients respiratory status     Bedside Nurse: Jocelin Gastelum RN

## 2021-07-06 NOTE — PROGRESS NOTES
07/06/21 0700   Quick Adds   Type of Visit Initial PT Evaluation   Living Environment   People in home alone   Current Living Arrangements assisted living;independent living facility   Home Accessibility no concerns   Transportation Anticipated family or friend will provide   Living Environment Comments Pt lives at the [facility].  Pt doesn't receive any services   Self-Care   Usual Activity Tolerance good   Current Activity Tolerance moderate   Regular Exercise Yes   Activity/Exercise Type other (see comments)   Exercise Amount/Frequency daily   Equipment Currently Used at Home grab bar, toilet;grab bar, tub/shower;walker, rolling   Activity/Exercise/Self-Care Comment Pt reports being independent with all ADLs, including showering (stands to shower) and taking meds.  Pt has a friend get her groceries.  Pt reports doing daily stretching while standing at the counter and walks to get the mail and to the chapel. Pt enjoys puzzles, zainab and painting and takes classes at [facility]   Disability/Function   Hearing Difficulty or Deaf yes   Patient's preferred means of communication verbal   Wear Glasses or Blind yes   Vision Management glasses   Difficulty Communicating no   Dressing/Bathing Difficulty no   Toileting issues no   Doing Errands Independently Difficulty (such as shopping) no   Fall history within last six months no   Change in Functional Status Since Onset of Current Illness/Injury yes   General Information   Onset of Illness/Injury or Date of Surgery 07/04/21   Referring Physician Dr. Itzel Villanueva   Patient/Family Therapy Goals Statement (PT) Pt would like to return home   Pertinent History of Current Problem (include personal factors and/or comorbidities that impact the POC) Marie Kline is a 89 year old female with a past medical history of CLL, heart failure, bladder cancer, essential hypertension, hypothyroidism, COPD on 2 L oxygen via nasal cannula at baseline who presents  with shortness of breath, productive cough over last day. Pt dx with acute on chronic respiratory failure secondary to COPD exacerbation and PNA.   Existing Precautions/Restrictions fall;oxygen therapy device and L/min  (2L/min)   Heart Disease Risk Factors High blood pressure;Medical history;Gender;Age   General Observations Pt lying in bed on 2L O2.  Vitals in supine: /50, HR 73 bpm, SpO2 93% on 2L.    Cognition   Orientation Status (Cognition) oriented x 3   Affect/Mental Status (Cognition) WNL   Follows Commands (Cognition) WNL   Pain Assessment   Patient Currently in Pain No   Integumentary/Edema   Integumentary/Edema Comments multiple bruises B LEs   Posture    Posture Forward head position;Protracted shoulders   Range of Motion (ROM)   ROM Comment Limited L hip flexion due to reports of previous hip fx.  Pt also presents with B limited shld flexion.  R shld flex to ~ 100 deg and L to ~ 70  deg   Strength   Strength Comments Pt presents with decreased functional UE and LE strength as observed with  mobility.  Poor eccentric quad control with stand  > sit.  MMT B hip flex 3+/5, knee ext and ankle DF 4+/5, B shld flex ~ 3/5 in available range   Bed Mobility   Bed Mobility supine-sit   Supine-Sit [place] (Bed Mobility) independent   Transfers   Transfers sit-stand transfer   Sit-Stand Transfer   Sit-Stand [place] (Transfers) supervision   Assistive Device (Sit-Stand Transfers) walker, 4-wheeled   Sit/Stand Transfer Comments Sit <>stand to 4WW with S with pt pulling up on 4WW handles.  Poor eccentric quad control with rapid decent with stand > sit.     Gait/Stairs (Locomotion)   [place] Level (Gait) supervision   Assistive Device (Gait) walker, 4-wheeled   Distance in Feet (Required for LE Total Joints) 300'   Comment (Gait/Stairs) Pt amb ~300' with 4WW  on 2L O2 with S.  Slow gait with narrow  PRABHAKAR.  Gait distance limited by fatigue and SOB.  SpO2 after gait 88% with recovery to 92% in~ 5 sec.      Balance   Balance Comments Pt requires B UE support on 4WW for safe standing/gait balance   Coordination   Coordination no deficits were identified   Muscle Tone   Muscle Tone Comments Full body tremors noted in initial stance.   Tremors subsided after standing for ~ 10 sec at 4WW.   Clinical Impression   Criteria for Skilled Therapeutic Intervention yes, treatment indicated   PT Diagnosis (PT) Decreased functional  mobility   Influenced by the following impairments Decreased activity tolerance, impaired gait and balance, decreased UE and LE strength and ROM   Functional limitations due to impairments Unable to amb longer distances  at her ILF to get to the mailbox or activity room,  increased falls risk   Clinical Presentation Stable/Uncomplicated   Clinical Presentation Rationale Pt progressing  medically   Clinical Decision Making (Complexity) low complexity   Therapy Frequency (PT) 5x/week   Predicted Duration of Therapy Intervention (days/wks) 2 days   Planned Therapy Interventions (PT) balance training;gait training;home exercise program;patient/family education;ROM (range of motion);strengthening;stretching;transfer training   Risk & Benefits of therapy have been explained evaluation/treatment results reviewed;care plan/treatment goals reviewed;risks/benefits reviewed;current/potential barriers reviewed;participants voiced agreement with care plan;participants included;patient   PT Discharge Planning    PT Discharge Recommendation (DC Rec) home with home care physical therapy   PT Rationale for DC Rec Pt presents below baseline with  mobility. Pt would benefit from  continued skilled PT  in the acute and home setting to increase functional strength and progress gait endurance and activity tolerance to enable pt to return  to prior level of function.   PT Brief overview of current status  SBA with 4WW   Total Evaluation Time   Total Evaluation Time (Minutes) 15

## 2021-07-06 NOTE — CONSULTS
CLINICAL NUTRITION SERVICES  -  ASSESSMENT NOTE      Recommendations Ordered by Registered Dietitian (RD):   Continue 2g Na diet  Change to low lactose diet per pt. - does not have lactose allergy and would like to order butter  Ensure Enlive BID   Malnutrition:   % Weight Loss:  Up to 7.5% in 3 months (non-severe malnutrition)  % Intake:  <75% for >/= 3 months (non-severe malnutrition)  Subcutaneous Fat Loss:  Orbital region moderate depletion and Upper arm region severe depletion  Muscle Loss:  Temporal region moderate depletion, Clavicle bone region severe depletion, Acromion bone region severe depletion, Dorsal hand region severe depletion, Anterior thigh region severe depletion and Posterior calf region severe depletion  Fluid Retention:  Edema noted per chart and pt.    Malnutrition Diagnosis: Severe malnutrition  In Context of:  Chronic illness or disease        REASON FOR ASSESSMENT  Marie Kline is a 89 year old female seen by Registered Dietitian for Provider Order - Pt./family education request.    PMH:  CLL, heart failure, bladder cancer, essential hypertension, hypothyroidism, COPD on 2 L oxygen via nasal cannula at baseline who presents with shortness of breath, productive cough over last day    NUTRITION HISTORY    Information obtained from patient     Food allergies/intolerances: NKFA - avoids milk, says a pharmacist told her to avoid dairy for diarrhea    Patient is on a regular diet at home.    Typical food/fluid intake PTA:    Breakfast: coffee and toast with butter or peanut butter    Lunch: Boost    Dinner: burger or hot dish    Fluids: water    Supplements at home: Boost 1x/day    Living situation: independent living    Meal preparation and grocery shopping: pt. Cooks her own food, her son grocery shops for her    Previous education and adherence: pt. Tries to limit how much water she drinks d/t fluid retention    Issues chewing or swallowing: pt.'s teeth were pulled and she now has dentures as  "of 9 months ago. She says when her teeth were failing and sore is when she lost weight.  Factors affecting nutrition intake include: diarrhea, difficulty chewing- dentures were left at home.  Pt. States she has had a very good appetite since taking prednisone.    CURRENT NUTRITION ORDERS  Diet Order:     2000 mg Sodium     Current Intake/Tolerance:  100% meal intakes yesterday    NUTRITION FOCUSED PHYSICAL ASSESSMENT FOR DIAGNOSING MALNUTRITION)  Yes    Obtained from Chart/Interdisciplinary Team:  Edema  Reviewed stooling patterns 3-4 BM per day.    ANTHROPOMETRICS  Height: 4' 11\"  Weight: 89 lbs 1.6 oz  Body mass index is 18 kg/m .  Weight Status:  Underweight BMI <18.5  Weight History:   5.3% wt. Loss in 10 months  3.2% weight loss in 3 months  Wt Readings from Last 10 Encounters:   07/06/21 40.4 kg (89 lb 1.6 oz)   05/19/21 40.8 kg (90 lb)   05/12/21 40.8 kg (90 lb)   04/15/21 38.3 kg (84 lb 8 oz)   04/07/21 38.9 kg (85 lb 12.8 oz)   03/30/21 41.9 kg (92 lb 4.8 oz)   03/24/21 40.4 kg (89 lb 1.6 oz)   01/06/21 40.2 kg (88 lb 11 oz)   09/10/20 43.1 kg (95 lb 1.6 oz)   09/03/20 42.6 kg (94 lb)       LABS  Labs reviewed  Labs:  Electrolytes  Potassium (mmol/L)   Date Value   07/06/2021 3.0 (L)   07/05/2021 3.4   07/04/2021 3.7     Phosphorus (mg/dL)   Date Value   05/01/2019 3.8   03/12/2018 3.9   05/12/2017 3.3   03/27/2017 2.9   03/26/2017 2.3 (L)    Blood Glucose  Glucose (mg/dL)   Date Value   07/06/2021 108 (H)   07/05/2021 98   07/04/2021 117 (H)   03/29/2021 94   03/28/2021 135 (H)     Hemoglobin A1C (%)   Date Value   06/17/2016 5.6   06/16/2016 5.6   10/09/2014 5.6   07/18/2014 5.7    Inflammatory Markers  WBC (10e9/L)   Date Value   07/06/2021 54.6 (HH)   07/05/2021 97.4 (HH)   07/04/2021 84.4 (HH)     Albumin (g/dL)   Date Value   03/27/2021 3.2 (L)   06/30/2020 3.7   07/21/2019 1.7 (L)      Magnesium (mg/dL)   Date Value   05/01/2019 2.0   12/08/2018 2.4 (H)   03/12/2018 2.1     Sodium (mmol/L)   Date " Value   07/06/2021 134   07/05/2021 132 (L)   07/04/2021 134    Renal  Urea Nitrogen (mg/dL)   Date Value   07/06/2021 15   07/05/2021 18   07/04/2021 13     Creatinine (mg/dL)   Date Value   07/06/2021 0.83   07/05/2021 0.89   07/04/2021 0.85     Additional  Triglycerides (mg/dL)   Date Value   01/06/2021 102   05/01/2019 160 (H)   03/12/2018 98     Ketones Urine (mg/dL)   Date Value   04/15/2021 Negative          MEDICATIONS  Medications reviewed    aspirin  81 mg Oral Daily     atorvastatin  20 mg Oral QPM     fluticasone  1 spray Both Nostrils Daily     ipratropium - albuterol 0.5 mg/2.5 mg/3 mL  3 mL Nebulization Q4H While awake     levothyroxine  75 mcg Oral Daily     piperacillin-tazobactam  3.375 g Intravenous Q6H     predniSONE  40 mg Oral Daily     sertraline  50 mg Oral At Bedtime     sodium chloride (PF)  3 mL Intracatheter Q8H     traZODone  50 mg Oral At Bedtime         acetaminophen, albuterol, lidocaine 4%, lidocaine (buffered or not buffered), melatonin, ondansetron **OR** ondansetron, sodium chloride (PF)     ASSESSED NUTRITION NEEDS PER APPROVED PRACTICE GUIDELINES:    Dosing Weight 40.4 kg  Estimated Energy Needs: 742 x 1.5 = 3617-1927 kcals ([place] [address] Jeor)  Justification: underweight  Estimated Protein Needs: 48-60 grams protein (1.2-1.5 g pro/Kg)  Justification: hypercatabolism with critical illness and preservation of lean body mass  Estimated Fluid Needs: per provider pending fluid status      NUTRITION DIAGNOSIS:  Inadequate protein-energy intake and Malnutrition related to CLL, heart failure, COPD as evidenced by severe muscle loss, moderate-severe fat loss, weight loss, fluid retention.      NUTRITION INTERVENTIONS  Recommendations / Nutrition Prescription  Continue 2g Na diet  Change to low lactose diet per pt. - does not have lactose allergy and would like to order butter  Ensure Enlive BID    Implementation  Nutrition education: Provided education on low sodium diet, stressed that  eating enough food is most important.   Composition of Meals and Snacks - at least 3 meals/day, supported pt. Drinking at least 1 Boost/day, and other calorie-enhancing strategies  Collaboration and Referral of Nutrition care      Nutrition Goals  Pt. To consume % of all meals/supplements.      MONITORING AND EVALUATION:  Progress towards goals will be monitored and evaluated per protocol and Practice Guidelines    Kiara Ochoa RDN, LD  Clinical Dietitian

## 2021-07-06 NOTE — PROGRESS NOTES
Care Management Follow Up    Length of Stay (days): 2    Expected Discharge Date: 07/07/21(/[facility])     Concerns to be Addressed:       Patient plan of care discussed at interdisciplinary rounds: Yes    Anticipated Discharge Disposition:  Return to [facility]     Anticipated Discharge Services:  PT, OT  Anticipated Discharge DME:  None    Patient/family educated on Medicare website which has current facility and service quality ratings:  No  Education Provided on the Discharge Plan:  Yes  Patient/Family in Agreement with the Plan:  Yes    Referrals Placed by CM/SW: [facility]   Private pay costs discussed: Not applicable    Additional Information:  FANI spoke with [facility] ([phone]) to verify if/when patient has had home care through /[facility]. She was open with /[facility] in September of 2020. She has a [facility] PCP through the [facility]. Sent referral to [facility] via email.     CM will continue to follow patient until discharge for any additional needs.     Cassie Casey RN, BSN, CPHN, CM  Inpatient Care Coordination - M/S, [facility]  [phone]

## 2021-07-06 NOTE — PLAN OF CARE
To Do:  End of Shift Summary  For vital signs and complete assessments, please see documentation flowsheets.     Pertinent assessments: VSS, alert and oriented x4.  Diminished lung sounds bilaterally. Blanchable redness on sacrum and spine, Mepilex in place on spine. Diet is now 2 gm NA and low lactose as pt was still having diarrhea.      Major Shift Events: PIV discontinued, started on oral abx. Potassium was 3.0, pt given Klor-con oral tab with lab recheck in 4 hrs.     Treatment Plan: Wait for results of potassium draw, pt likely discharging tomorrow.    Bedside Nurse: Radha Gorman RN

## 2021-07-06 NOTE — PROGRESS NOTES
[facility]    Hospitalist Progress Note             Date of Admission:  7/4/2021                   Day of hospitalization: 2    Assessment and Plan:   Marie Kline is a 89 year old female with a past medical history of CLL, heart failure, bladder cancer, essential hypertension, hypothyroidism, COPD on 2 L oxygen via nasal cannula at baseline who presents with shortness of breath, productive cough over last day.       #Acute on chronic hypoxemic respiratory failure secondary to COPD exacerbation and Gram Negative Pneumonia   - Patient is on 2 L oxygen at baseline.  She was reportedly satting in the low to mid 80s in the ED on 2 L.  She is currently on 3-1/2 and saturating well.  She has noted shortness of breath that started the day prior to admission.  Endorses cough that is productive.  Some associated chest pressure with coughing.  Of note, patient was hospitalized back in March and did grow Pseudomonas in her sputum culture.  -We will treat with prednisone, scheduled duo nebs, albuterol as needed.  - I have switched to PO levofloxacin based on her previous cultures. Repeat sputum cultures pending.    -Maintain oxygenation greater at around 90% given COPD  - patient wants to stay in hospital another night     #Possible acute on chronic HFpEF: Patient had echo done back in March 2021 that showed EF of 60 to 65%.  There was some evidence of diastolic dysfunction.  She is on 20 mg Lasix daily.  Her BNP is a bit elevated chest x-ray shows probable fluid overload.  -She received 20 mg IV Lasix in the ED.  We will monitor urine output with I's and O's and daily weights.  -Initially there was concern for possible mild acute heart failure however suspicion for low she actually appears intravascularly depleted will hold off further diuresis     #CLL: Follows with Dr. Ivory from [place] [facility].  Last seen in early June of this year.  She has chronic elevation of her white count and hemoglobin and  "platelets are stable.  She does get IVIG every 3 months.  We will follow up with oncology as outpatient.     #HTN: Hold antihypertensives for now. BP well controlled.   #HL: Continue home statin     #hypokalemia  - replace per protocol     # Malnutrition Diagnosis: Severe malnutrition In Context of:  Chronic illness or disease  - per dietician , Ensure Enlive BID      DVT Prophylaxis: Pneumatic Compression Devices  Code Status: DNR / DNI. Consistent with prior admissions.   Dispo: Admit to inpatient.                Itzel Villanueva MD  Text Page (7am - 6pm, M-F)               Subjective   Chief Complaint: Shortness of breath  Subjective: Has dry hacking cough shortness of breath present but improving she says she is unsteady when she is walking. Patient had requested her diet be changed to with lactose, however, now with diarrhea. Discussed with patient importance of lactose free diet. Otherwise no fever.  No further complaints     Objective   /61 (BP Location: Right arm)   Pulse 67   Temp 97.9  F (36.6  C) (Oral)   Resp 22   Ht 1.499 m (4' 11\")   Wt 40.4 kg (89 lb 1.6 oz)   LMP  (LMP Unknown)   SpO2 94%   BMI 18.00 kg/m       Physical Exam  General: Pt in NAD, normal appearance  HEENT: OP clear MMM, no JVD  Lungs: Clear to Auscultation Bilateral, normal breathing  without accessory muscle usage, no wheezing, rhonchi or crackles  Cardiac: +S1, S2, RRR, no MRG, no edema  Abdominal: normal bowel sounds, NT/ND, no hepatosplenomegaly  Skin: warm, dry, normal turgor, no rash  Psyche: A& O x3, appropriate affect             Intake/Output Summary (Last 24 hours) at 7/5/2021 1202  Last data filed at 7/5/2021 0915  Gross per 24 hour   Intake 120 ml   Output --   Net 120 ml           Labs and Imaging Results:      Recent Labs   Lab 07/06/21  0721 07/05/21  0659   WBC 54.6* 97.4*   HGB 8.9* 9.8*   PLT 68* 94*        Recent Labs   Lab 07/06/21  0721 07/05/21  0659    132*   CO2 33* 34*   BUN 15 18      No " results for input(s): INR, PTT in the last 168 hours.   No results for input(s): CKMB in the last 168 hours.    Invalid input(s): TROPONINT   No results for input(s): ALBUMIN, AST, ALT, ALKPHOS, BILITOT in the last 168 hours.     Micro:     Radio:  XR Chest Port 1 View   Final Result   IMPRESSION: Emphysema. New left upper lobe patchy opacity. Increased   interstitial opacities throughout the lungs. This could represent   pneumonia and/or pulmonary edema. A follow-up chest CT or radiograph   after treatment to ensure resolution is recommended. Linear scarring   in the left midlung has also increased. No pleural effusion or   pneumothorax. Normal heart size. Thoracic scoliosis.      BENJIE POSEY MD             SYSTEM ID:  [id number]              Medications:      Scheduled Meds:      aspirin  81 mg Oral Daily     atorvastatin  20 mg Oral QPM     fluticasone  1 spray Both Nostrils Daily     ipratropium - albuterol 0.5 mg/2.5 mg/3 mL  3 mL Nebulization Q4H While awake     levofloxacin  750 mg Oral Every Other Day     levothyroxine  75 mcg Oral Daily     predniSONE  40 mg Oral Daily     sertraline  50 mg Oral At Bedtime     sodium chloride (PF)  3 mL Intracatheter Q8H     traZODone  50 mg Oral At Bedtime     Continuous Infusions:    PRN Meds:  acetaminophen, albuterol, lidocaine 4%, lidocaine (buffered or not buffered), melatonin, ondansetron **OR** ondansetron, sodium chloride (PF)             detailed exam details…

## 2021-07-06 NOTE — PLAN OF CARE
To Do:  End of Shift Summary  For vital signs and complete assessments, please see documentation flowsheets.     Pertinent assessments: A/Ox4 but forgetful. Denies pain and nausea. LS inspiratory  wheezy, BENJAMIN and productive cough. Blanchable redness to bottom and spine. Mepilex applied to spine and encourage repositioning. 2x soft stool this shift.  Major Shift Events: uneventful  Treatment Plan: Zosyn, Nebs and Pred po.  Bedside Nurse: Fide Melvin RN

## 2021-07-07 VITALS
SYSTOLIC BLOOD PRESSURE: 130 MMHG | DIASTOLIC BLOOD PRESSURE: 59 MMHG | HEIGHT: 59 IN | HEART RATE: 83 BPM | WEIGHT: 90.9 LBS | OXYGEN SATURATION: 93 % | BODY MASS INDEX: 18.32 KG/M2 | TEMPERATURE: 98 F | RESPIRATION RATE: 20 BRPM

## 2021-07-07 LAB
ANION GAP SERPL CALCULATED.3IONS-SCNC: 1 MMOL/L (ref 3–14)
BACTERIA SPEC CULT: NORMAL
BUN SERPL-MCNC: 22 MG/DL (ref 7–30)
CALCIUM SERPL-MCNC: 9.1 MG/DL (ref 8.5–10.1)
CHLORIDE SERPL-SCNC: 102 MMOL/L (ref 94–109)
CO2 SERPL-SCNC: 35 MMOL/L (ref 20–32)
CREAT SERPL-MCNC: 0.78 MG/DL (ref 0.52–1.04)
ERYTHROCYTE [DISTWIDTH] IN BLOOD BY AUTOMATED COUNT: 16.1 % (ref 10–15)
GFR SERPL CREATININE-BSD FRML MDRD: 68 ML/MIN/{1.73_M2}
GLUCOSE SERPL-MCNC: 82 MG/DL (ref 70–99)
HCT VFR BLD AUTO: 28.7 % (ref 35–47)
HGB BLD-MCNC: 8.8 G/DL (ref 11.7–15.7)
INTERPRETATION ECG - MUSE: NORMAL
MCH RBC QN AUTO: 29.8 PG (ref 26.5–33)
MCHC RBC AUTO-ENTMCNC: 30.7 G/DL (ref 31.5–36.5)
MCV RBC AUTO: 97 FL (ref 78–100)
PLATELET # BLD AUTO: 77 10E9/L (ref 150–450)
POTASSIUM SERPL-SCNC: 3.9 MMOL/L (ref 3.4–5.3)
RBC # BLD AUTO: 2.95 10E12/L (ref 3.8–5.2)
SODIUM SERPL-SCNC: 138 MMOL/L (ref 133–144)
SPECIMEN SOURCE: NORMAL
WBC # BLD AUTO: 48.8 10E9/L (ref 4–11)

## 2021-07-07 PROCEDURE — 99239 HOSP IP/OBS DSCHRG MGMT >30: CPT | Performed by: HOSPITALIST

## 2021-07-07 PROCEDURE — 250N000012 HC RX MED GY IP 250 OP 636 PS 637: Performed by: HOSPITALIST

## 2021-07-07 PROCEDURE — 36415 COLL VENOUS BLD VENIPUNCTURE: CPT | Performed by: HOSPITALIST

## 2021-07-07 PROCEDURE — 85027 COMPLETE CBC AUTOMATED: CPT | Performed by: HOSPITALIST

## 2021-07-07 PROCEDURE — 250N000013 HC RX MED GY IP 250 OP 250 PS 637: Performed by: HOSPITALIST

## 2021-07-07 PROCEDURE — 250N000009 HC RX 250: Performed by: HOSPITALIST

## 2021-07-07 PROCEDURE — 80048 BASIC METABOLIC PNL TOTAL CA: CPT | Performed by: HOSPITALIST

## 2021-07-07 PROCEDURE — 94640 AIRWAY INHALATION TREATMENT: CPT

## 2021-07-07 PROCEDURE — 999N000157 HC STATISTIC RCP TIME EA 10 MIN

## 2021-07-07 PROCEDURE — 84132 ASSAY OF SERUM POTASSIUM: CPT | Performed by: HOSPITALIST

## 2021-07-07 PROCEDURE — 94640 AIRWAY INHALATION TREATMENT: CPT | Mod: 76

## 2021-07-07 RX ORDER — PREDNISONE 20 MG/1
40 TABLET ORAL DAILY
Qty: 8 TABLET | Refills: 0 | Status: SHIPPED | OUTPATIENT
Start: 2021-07-07 | End: 2021-07-11

## 2021-07-07 RX ORDER — LEVOFLOXACIN 750 MG/1
750 TABLET, FILM COATED ORAL EVERY OTHER DAY
Qty: 2 TABLET | Refills: 0 | Status: SHIPPED | OUTPATIENT
Start: 2021-07-07 | End: 2021-07-10

## 2021-07-07 RX ORDER — LEVOFLOXACIN 500 MG/1
500 TABLET, FILM COATED ORAL DAILY
Qty: 3 TABLET | Refills: 0 | Status: SHIPPED | OUTPATIENT
Start: 2021-07-07 | End: 2021-07-07

## 2021-07-07 RX ORDER — LEVOFLOXACIN 500 MG/1
500 TABLET, FILM COATED ORAL
Qty: 2 TABLET | Refills: 0 | Status: SHIPPED | OUTPATIENT
Start: 2021-07-07 | End: 2021-07-07

## 2021-07-07 RX ADMIN — IPRATROPIUM BROMIDE AND ALBUTEROL SULFATE 3 ML: .5; 3 SOLUTION RESPIRATORY (INHALATION) at 07:50

## 2021-07-07 RX ADMIN — PREDNISONE 40 MG: 20 TABLET ORAL at 09:58

## 2021-07-07 RX ADMIN — IPRATROPIUM BROMIDE AND ALBUTEROL SULFATE 3 ML: .5; 3 SOLUTION RESPIRATORY (INHALATION) at 11:47

## 2021-07-07 RX ADMIN — LEVOTHYROXINE SODIUM 75 MCG: 0.07 TABLET ORAL at 09:59

## 2021-07-07 ASSESSMENT — ACTIVITIES OF DAILY LIVING (ADL)
ADLS_ACUITY_SCORE: 19

## 2021-07-07 ASSESSMENT — MIFFLIN-ST. JEOR: SCORE: 742.95

## 2021-07-07 NOTE — PROGRESS NOTES
SPIRITUAL HEALTH SERVICES Progress Note  FR Med Surg 5    Spiritual Health visit per consult order for prayer before discharge.  Pt is seated in bedside chair, dtr Margaret present.  Marie welcomed a time of prayer and shared a psalm reading Ps25.  She shared about losses of her daughter at age 12 and son last Sept to cancer.  She has one surviving son, coming to visit soon and daughter, Margaret for love and support.  Marie expressed her gratitude and love of God and about how she has coped with her losses through her reinier.      Plan: Pt preparing for discharge.     Jean Murphy MA  Staff   Pager: [phone]  Phone: [phone]

## 2021-07-07 NOTE — PLAN OF CARE
To Do:  End of Shift Summary  For vital signs and complete assessments, please see documentation flowsheets.     Pertinent assessments: VSS. O2 2L NC (baseline). BENJAMIN. Denies pain and nausea. Up SBA with walker. 1x soft stool.  Major Shift Events: uneventful  Treatment Plan:Levaquin po and discharge to [facility] today.  Bedside Nurse: Fide Melvin RN

## 2021-07-07 NOTE — PLAN OF CARE
To Do:  End of Shift Summary  For vital signs and complete assessments, please see documentation flowsheets.     Pertinent assessments: Patient is Aox4 this shift. Denies having any pain. BENJAMIN, on 2 liters O2 via NC which is patients baseline. Diminished lung sounds bilaterally. Active bowel sounds, soft stools related to patients lactose intake. Scattered bruising and scabs to all extremities.     Major Shift Events: Uneventful     Treatment Plan: Plan to discharge back home tomorrow to assisted living facility     Bedside Nurse: Jocelin Gastelum RN

## 2021-07-07 NOTE — PROGRESS NOTES
Care Management Discharge Note    Discharge Date: 07/07/21(/[facility])     Discharge Disposition:  Assisted Living    Discharge Services:  [facility] for PT & RN    Discharge DME:  None    Discharge Transportation: family or friend will provide    Private pay costs discussed: Not applicable    PAS Confirmation Code:  N/A  Patient/family educated on Medicare website which has current facility and service quality ratings:  N/A    Education Provided on the Discharge Plan: Yes   Persons Notified of Discharge Plans: Patient  Patient/Family in Agreement with the Plan:  Yes    Handoff Referral Completed: No    Additional Information:  CM updated by ZAID Hamm RN Liaison for [facility] that patient had [facility] as of April, 2021. Contacted [facility] ([phone]) spoke with Casandra. She stated they are able to accept patient. Faxed discharge orders to [phone]. Updated AVS.     CM will continue to follow patient until discharge for any additional needs.     Cassie Casey RN, BSN, CPHN, CM  Inpatient Care Coordination - M/S, [facility]  [phone]

## 2021-07-07 NOTE — PLAN OF CARE
Physical Therapy Discharge Summary    Reason for therapy discharge:    Discharged to home with home therapy.    Progress towards therapy goal(s). See goals on Care Plan in [facility] electronic health record for goal details.  Goals not met.  Barriers to achieving goals:   discharge from facility.    Therapy recommendation(s):    Continued therapy is recommended.  Rationale/Recommendations:  HHPT per prior PT recommendation.

## 2021-07-07 NOTE — PLAN OF CARE
Reviewed discharge instructions and medications with patient and daugther. Questions answered. Patient discharged to home with daughter, discharge instructions, medications Levaquin, Prednisone and belongings at this time.

## 2021-07-07 NOTE — DISCHARGE INSTRUCTIONS
"You have a new order for [facility] for PT \"& RN services. They will be contacting you within 24-48 hours to set up initial visit. If you have not heard from them after this time, please contact them at ([phone]).     "

## 2021-07-07 NOTE — DISCHARGE SUMMARY
Discharge Summary  Hospitalist Service      Marie Kline MRN# [id number]   YOB: 1932 Age: 89 year old     Date of Admission:  7/4/2021  Date of Discharge:  7/7/2021  Admitting Physician:  Ildefonso Whatley MD  Discharge Physician: Itzel Villanueva MD   Discharging Service: Hospitalist Service     Primary Provider: Nehal Freeman  Primary Care Physician Phone Number: [phone]         Discharge Diagnoses/Problem Oriented Hospital Course (Providers):      Discharge Diagnoses   # Acute on chronic hypoxemic respiratory failure secondary to COPD exacerbation and Gram Negative Pneumonia   # Chronic HFpEF  # CLL  #HTN  #HL  Hospital Course   Marie Kline is a 89 year old female with a past medical history of CLL, heart failure, bladder cancer, essential hypertension, hypothyroidism, COPD on 2 L oxygen via nasal cannula at baseline who presents with shortness of breath, productive cough over last day.  In the hospital she was treated with IV zosyn and prednisone and switched to oral levofloxacin. She will be discharged home with oral levofloxacin and prednisone.  Discussed with family as well to try and follow lactose free diet given her history of diarrhea and being told to be on lactose free diet. It is recommended to have have a repeat CT chest non contrast to evaluate improvement of findings on her Chest xray, to be done outpatient, discussed with daughter     #Acute on chronic hypoxemic respiratory failure secondary to COPD exacerbation and Gram Negative Pneumonia   - Patient is on 2 L oxygen at baseline.  She was reportedly satting in the low to mid 80s in the ED on 2 L.  She is currently on 3-1/2 and saturating well.  She has noted shortness of breath that started the day prior to admission.  Endorses cough that is productive.  Some associated chest pressure with coughing.  Of note, patient was hospitalized back in March and did grow Pseudomonas in her sputum culture.  -We will treat with prednisone,  scheduled duo nebs, albuterol as needed.  - I have switched to PO levofloxacin based on her previous cultures. Repeat sputum cultures no growth.    -Maintain oxygenation greater at around 90% given COPD  - it is recommended to have have a repeat CT chest non contrast to evaluate improvement of findings on her Chest xray, to be done outpatient, discussed with daughter    #Possible acute on chronic HFpEF: Patient had echo done back in March 2021 that showed EF of 60 to 65%.  There was some evidence of diastolic dysfunction.  She is on 20 mg Lasix daily.  Her BNP is a bit elevated chest x-ray shows probable fluid overload.  -She received 20 mg IV Lasix in the ED.  We will monitor urine output with I's and O's and daily weights.  -Initially there was concern for possible mild acute heart failure however suspicion for low she actually appears intravascularly depleted will hold off further diuresis     #CLL: Follows with Dr. Ivory from [facility].  Last seen in early June of this year.  She has chronic elevation of her white count and hemoglobin and platelets are stable.  She does get IVIG every 3 months.  We will follow up with oncology as outpatient.     #HTN: Hold antihypertensives for now. BP well controlled.   #HL: Continue home statin     #hypokalemia  - replace per protocol          Code Status:      Full Code         Important Results:                  Pending Results:        Unresulted Labs Ordered in the Past 30 Days of this Admission     No orders found from 6/4/2021 to 7/5/2021.               Discharge Instructions and Follow-Up:      Follow-up Appointments     Follow-up and recommended labs and tests       Follow up with primary care provider, Nehal Freeman, within 7 days for   hospital follow- up.  No follow up labs or test are needed.                  Discharge Disposition:        Discharged to home          Discharge Medications:        Current Discharge Medication List      START taking these  medications    Details   levofloxacin (LEVAQUIN) 750 MG tablet Take 1 tablet (750 mg) by mouth every other day for 3 days  Qty: 2 tablet, Refills: 0    Associated Diagnoses: Community acquired pneumonia, unspecified laterality      predniSONE (DELTASONE) 20 MG tablet Take 2 tablets (40 mg) by mouth daily for 4 days  Qty: 8 tablet, Refills: 0    Associated Diagnoses: COPD exacerbation (H)         CONTINUE these medications which have NOT CHANGED    Details   albuterol (PROVENTIL HFA) 108 (90 Base) MCG/ACT inhaler Inhale 2 puffs into the lungs every 6 hours as needed for shortness of breath / dyspnea or wheezing    Comments: Pharmacy may dispense brand covered by insurance (Proair, or proventil or ventolin or generic albuterol inhaler)      albuterol (PROVENTIL) (2.5 MG/3ML) 0.083% neb solution Take 1 vial (2.5 mg) by nebulization every 6 hours as needed for shortness of breath / dyspnea or wheezing  Qty: 3 mL, Refills: 1    Associated Diagnoses: COPD with acute exacerbation (H)      aspirin 81 MG EC tablet Take 81 mg by mouth daily      atorvastatin (LIPITOR) 20 MG tablet Take 1 tablet (20 mg) by mouth daily  Qty: 90 tablet, Refills: 4    Associated Diagnoses: ACS (acute coronary syndrome) (H); Hyperlipidemia with target LDL less than 130      desloratadine (CLARINEX) 5 MG tablet Take 5 mg by mouth daily as needed for allergies      ferrous sulfate (IRON) 325 (65 FE) MG tablet Take 325 mg by mouth daily (with breakfast)       fluticasone (FLONASE) 50 MCG/ACT nasal spray Spray 1 spray into both nostrils daily  Qty: 16 g, Refills: 0    Associated Diagnoses: Seasonal allergic rhinitis due to other allergic trigger      levothyroxine (SYNTHROID/LEVOTHROID) 75 MCG tablet Take 1 tablet (75 mcg) by mouth daily  Qty: 90 tablet, Refills: 3    Comments: Profile Rx: patient will contact pharmacy when needed  Associated Diagnoses: Other specified hypothyroidism      loperamide (IMODIUM) 2 MG capsule Take 2 mg by mouth daily as  "needed for diarrhea      metoprolol succinate ER (TOPROL-XL) 25 MG 24 hr tablet Take 1 tablet (25 mg) by mouth daily  Qty: 90 tablet, Refills: 3    Comments: Profile Rx: patient will contact pharmacy when needed  Associated Diagnoses: ACS (acute coronary syndrome) (H)      mometasone-formoterol (DULERA) 200-5 MCG/ACT inhaler Inhale 2 puffs into the lungs 2 times daily      Multiple Vitamins-Minerals (MULTIVITAMIN ADULT) CHEW Take 2 chew tab by mouth daily      sertraline (ZOLOFT) 50 MG tablet Take 1 tablet (50 mg) by mouth daily  Qty: 90 tablet, Refills: 1    Comments: Profile Rx: patient will contact pharmacy when needed  Associated Diagnoses: LESLY (generalized anxiety disorder)      traZODone (DESYREL) 50 MG tablet Take 50 mg by mouth At Bedtime      furosemide (LASIX) 20 MG tablet Take 1 tablet (20 mg) by mouth daily  Qty: 90 tablet, Refills: 4    Associated Diagnoses: Chronic congestive heart failure, unspecified heart failure type (H)      Immune Globulin, Human, (OCTAGAM) 5 GM/100ML SOLN 300 mg/kg into the vein every 30 days    Hold this medication while in TCU-resume at discharge from TCU    Associated Diagnoses: Acute and chronic respiratory failure with hypercapnia (H)                  Allergies:         Allergies   Allergen Reactions     Diagnostic X-Ray Materials Hives     CT DYE     Contrast Dye Hives     CT DYE     Prolia [Denosumab]      Osteonecrosis jaw       Wound Dressing Adhesive             Consultations This Hospital Stay:        No consultations were requested during this admission          Condition and Physical Exam on Discharge:        Discharge condition: Stable   Discharge vitals: Blood pressure 130/59, pulse 71, temperature 98  F (36.7  C), temperature source Oral, resp. rate 20, height 1.499 m (4' 11\"), weight 41.2 kg (90 lb 14.4 oz), SpO2 95 %, not currently breastfeeding.   General: Pt in NAD, normal appearance  HEENT: OP clear MMM, no JVD  Lungs: Clear to Auscultation Bilateral, normal " breathing  without accessory muscle usage, no wheezing, rhonchi or crackles  Cardiac: +S1, S2, RRR, no MRG, no edema  Abdominal: normal bowel sounds, NT/ND, no hepatosplenomegaly  Skin: warm, dry, normal turgor, no rash  Psyche: A& O x3, appropriate affect            Discharge Orders for Skilled Facility (from Discharge Orders):        After Care Instructions     Activity      Your activity upon discharge: activity as tolerated         Diet      Follow this diet upon discharge: Orders Placed This Encounter      Combination Diet 2 gm NA Diet                    Rehab orders for Skilled Facility (from Discharge Orders):      Referrals     Future Labs/Procedures    Home care nursing referral     Comments:    RN skilled nursing visit. RN to assess respiratory and cardiac status.    Your provider has ordered home care nursing services. If you have not been   contacted within 2 days of your discharge please call the inpatient   department phone number at [phone] .    Home Care PT Referral for Hospital Discharge     Comments:    PT to eval and treat    Your provider has ordered home care - physical therapy. If you have not   been contacted within 2 days of your discharge please call the department   phone number listed on the top of this document.             Discharge Time:      Greater than 30 minutes.        Image Results From This Hospital Stay (For Non-EPIC Providers):        Results for orders placed or performed during the hospital encounter of 07/04/21   XR Chest Port 1 View    Narrative    XR CHEST PORT 1 VIEW 7/4/2021 9:54 AM    HISTORY: sob    COMPARISON: 4/26/2021 and 3/26/2021      Impression    IMPRESSION: Emphysema. New left upper lobe patchy opacity. Increased  interstitial opacities throughout the lungs. This could represent  pneumonia and/or pulmonary edema. A follow-up chest CT or radiograph  after treatment to ensure resolution is recommended. Linear scarring  in the left midlung has also increased.  No pleural effusion or  pneumothorax. Normal heart size. Thoracic scoliosis.    BENJIE POSEY MD          SYSTEM ID:  [id number]           Most Recent Lab Results In EPIC (For Non-EPIC Providers):    Most Recent 3 CBC's:  Recent Labs   Lab Test 07/07/21  0732 07/06/21  0721 07/05/21  0659   WBC 48.8* 54.6* 97.4*   HGB 8.8* 8.9* 9.8*   MCV 97 97 96   PLT 77* 68* 94*      Most Recent 3 BMP's:  Recent Labs   Lab Test 07/07/21  0732 07/06/21  1442 07/06/21  0721 07/05/21  0659     --  134 132*   POTASSIUM 3.9 3.9 3.0* 3.4   CHLORIDE 102  --  98 95   CO2 35*  --  33* 34*   BUN 22  --  15 18   CR 0.78  --  0.83 0.89   ANIONGAP 1*  --  3 3   CARLOS 9.1  --  9.0 8.9   GLC 82  --  108* 98     Most Recent 3 Troponin's:No lab results found.  Most Recent 3 INR's:  Recent Labs   Lab Test 08/24/18  1306 10/09/14  0350   INR 1.00 1.21*     Most Recent 2 LFT's:  Recent Labs   Lab Test 03/27/21  1122 06/30/20  1328   AST 24 24   ALT 20 23   ALKPHOS 136 131   BILITOTAL 0.6 0.5     Most Recent Cholesterol Panel:  Recent Labs   Lab Test 01/06/21  1130   CHOL 155   LDL 89   HDL 46*   TRIG 102     Most Recent 6 Bacteria Isolates From Any Culture (See EPIC Reports for Culture Details):  Recent Labs   Lab Test 07/05/21  1438 07/04/21  1345 03/28/21  1115 11/13/19  1333 10/15/19  1419 07/19/19  0645   CULT Light growth  Normal yovanny   >10 Squamous epithelial cells/low power field indicates oral contamination. Please   recollect.  *  Notification of test cancellation was given to  LUNA LANTIGUA RN @6656 7.4.21 LM   Heavy growth  Normal yovanny    Heavy growth  Pseudomonas aeruginosa  * No growth >100,000 colonies/mL  Klebsiella oxytoca  * No growth     Most Recent TSH, T4 and HgbA1c:  Recent Labs   Lab Test 03/24/21  1224 12/09/18  0652 12/09/18  0652 06/17/16  0400 06/17/16  0400   TSH 0.81   < > 7.36*   < >  --    T4  --   --  1.15   < >  --    A1C  --   --   --   --  5.6    < > = values in this interval not displayed.

## 2021-07-08 ENCOUNTER — MEDICAL CORRESPONDENCE (OUTPATIENT)
Dept: HEALTH INFORMATION MANAGEMENT | Facility: CLINIC | Age: 86
End: 2021-07-08

## 2021-07-08 ENCOUNTER — TELEPHONE (OUTPATIENT)
Dept: INTERNAL MEDICINE | Facility: CLINIC | Age: 86
End: 2021-07-08

## 2021-07-08 ENCOUNTER — PATIENT OUTREACH (OUTPATIENT)
Dept: CARE COORDINATION | Facility: CLINIC | Age: 86
End: 2021-07-08

## 2021-07-08 NOTE — TELEPHONE ENCOUNTER
Vicente informed of provider's message below.    States patient is aware she needs a follow up appointment.

## 2021-07-08 NOTE — TELEPHONE ENCOUNTER
IP F/U    Date: 7/7/21  Diagnosis:  Community Acquired Pneumonia, Unspecified Laterality   Is patient active in care coordination? Yes - Route to Care Coordination (P [id number])  Was patient in TCU? No

## 2021-07-08 NOTE — PROGRESS NOTES
Clinic Care Coordination Contact     Situation:  Patient chart reviewed by Community Health Worker.    Chart Review:  Community Health Worker attempted to call patient for CCC outreach.  However, upon chart review, it looks like further review is needed.     CHW CC'ed message to DIMA Vela, for chart review.    Plan/Recommendations:  CHW will hold off on further outreaches until further notification from DIMA FERRARA.     KOREY Lawrence  Clinic Care Coordination  [facility] : [place], [facility], and [place]  Phone: [phone]    ______________________  Next Outreach:  TBD  Planned Outreach Frequency: Monthly  Preferred Phone Number: [phone]    Last Assessment Date: 05/16/19  Care Plan Completion Date: 04/05/21  ______________________

## 2021-07-08 NOTE — TELEPHONE ENCOUNTER
Verbal order for:  for PT 1xwk for 1wk, 2xwk for 2wks, 1xwk for 1wk for balance and endurance.     This patient was hospitalized from 7/4 to 7/7 and needs f/u visit either in clinic or virtual preferably video.  Please schedule accordingly.

## 2021-07-08 NOTE — TELEPHONE ENCOUNTER
Vicente at [facility] ([phone]) calls for PT 1xwk for 1wk, 2xwk for 2wks, 1xwk for 1wk for balance and endurance.  OK to leave a detailed message..

## 2021-07-09 ENCOUNTER — TELEPHONE (OUTPATIENT)
Dept: INTERNAL MEDICINE | Facility: CLINIC | Age: 86
End: 2021-07-09

## 2021-07-09 ENCOUNTER — PATIENT OUTREACH (OUTPATIENT)
Dept: CARE COORDINATION | Facility: CLINIC | Age: 86
End: 2021-07-09

## 2021-07-09 NOTE — TELEPHONE ENCOUNTER
Fax received from [facility] - Physician Order  for review and signature.  Put in Nehal Freeman's in basket.

## 2021-07-09 NOTE — TELEPHONE ENCOUNTER
Fax received from [facility] - Physician Order 07/07/21 for review and signature.  Put in Nehal Freeman's in basket.      '

## 2021-07-09 NOTE — PROGRESS NOTES
Clinic Care Coordination Contact  [facility]/Voicemail       Clinical Data: Care Coordinator Outreach  Outreach attempted x 2.  Left message on patient's voicemail with call back information and requested return call.  Plan: Care Coordinator will try to reach patient again in 1-2 business days.      YESSI Vela  Clinic Care Coordinator  Ph. [phone]  [email or web address]

## 2021-07-09 NOTE — PROGRESS NOTES
Clinic Care Coordination Contact  [facility]/Voicemail       Clinical Data: Care Coordinator Outreach  Outreach attempted x 1.  Left message on patient's voicemail with call back information and requested return call.  Plan: Care Coordinator will try to reach patient again in 1-2 business days.      YESSI Vela  Clinic Care Coordinator  Ph. [phone]  [email or web address]

## 2021-07-12 ENCOUNTER — TELEPHONE (OUTPATIENT)
Dept: INTERNAL MEDICINE | Facility: CLINIC | Age: 86
End: 2021-07-12

## 2021-07-12 ENCOUNTER — PATIENT OUTREACH (OUTPATIENT)
Dept: NURSING | Facility: CLINIC | Age: 86
End: 2021-07-12
Payer: MEDICARE

## 2021-07-12 ASSESSMENT — ACTIVITIES OF DAILY LIVING (ADL): DEPENDENT_IADLS:: CLEANING;SHOPPING;TRANSPORTATION

## 2021-07-12 NOTE — TELEPHONE ENCOUNTER
I signed this form but patient has not had a f/u appt from her hospitalization which I assume these requests are from.  Last time seen was 5/2021.  Please schedule patient a virtual (preferable video) or in office visit.

## 2021-07-12 NOTE — TELEPHONE ENCOUNTER
Irish from [facility] ([phone]) calling with a patient BP of 98/48 at 11:15am today.  Irish is wondering if a med change/adjustment is needed or if parameters of when they need to call PCP needs to be adjusted.  Ok to leave a detailed message.

## 2021-07-12 NOTE — TELEPHONE ENCOUNTER
Verbal orders ok for now but this patient needs an appt with PCP either office visit or video since she was in the hospital. Medicare will not cover unless she is evaluated.  Please notify everyone to schedule.  Don't know how many times I need to relay this.    [facility] calling for verbal orders  For Skilled nursing  2 times a week for 2 weeks  1 time a week for 1 week     Home health aide  1 time a week for 6 weeks

## 2021-07-12 NOTE — TELEPHONE ENCOUNTER
Left detailed message for Irish approving home care orders and patient does have an appointment scheduled already 7/21.  ZAID Sin R.N.

## 2021-07-12 NOTE — TELEPHONE ENCOUNTER
Irish from [facility] calling for verbal orders  For Skilled nursing  2 times a week for 2 weeks  1 time a week for 1 week    Home health aide  1 time a week for 6 weeks  Ok to call and  [phone]

## 2021-07-12 NOTE — PROGRESS NOTES
Clinic Care Coordination Contact    Clinic Care Coordination Contact  OUTREACH    Referral Information:  Referral Source: IP Report  Primary Diagnosis: Acute on chronic hypoxemic respiratory failure secondary to COPD exacerbation and Gram Negative Pneumonia     Chief Complaint   Patient presents with     Clinic Care Coordination - Post Hospital     Discharge to Home     Clinic Care Coordination - Homecare/TCU     [facility]        [place] Utilization: Reviewed on this date.   Difficulty keeping appointments: No  Compliance Concerns: No  No-Show Concerns: No  No PCP office visit in Past Year: No  Utilization    Hospital Admissions  2             ED Visits  3             No Show Count (past year)  0                Current as of: 7/12/2021  8:53 AM              Clinical Concerns:  Current Medical Concerns:  Patient is hospitalized at [facility] from 7.4.2021 to 7.7.2021 with diagnoses of:  # Acute on chronic hypoxemic respiratory failure secondary to COPD exacerbation and Gram Negative Pneumonia   # Chronic HFpEF  # CLL  #HTN  #HL    Patient Active Problem List   Diagnosis     Acute and chronic respiratory failure with hypercapnia (HCC)     Nonischemic cardiomyopathy (H)     Pneumonia     Acute myocardial infarction, initial episode of care (HCC)     CLL (chronic lymphocytic leukemia) (HCC)     CHF (congestive heart failure) (HCC)     Cardiomyopathy in other diseases classified elsewhere (HCC)     Hyperlipidemia with target LDL less than 130     Health Care Home     COPD exacerbation (H)     LESLY (generalized anxiety disorder)     Hypothyroidism     Back pain with radiation     DDD (degenerative disc disease), lumbar     Splenomegaly     Lumbar degenerative disc disease     Lumbar foraminal stenosis     Central spinal stenosis     Bladder cancer (H)     Sepsis (H)     Senile osteoporosis     CKD (chronic kidney disease) stage 3, GFR 30-59 ml/min     Purpura senilis (H)     Xerosis cutis      Malignant neoplasm of urinary bladder, unspecified site (H)     Hypoxia     Femoral neck fracture (H)     S/P total hip arthroplasty     NSTEMI (non-ST elevated myocardial infarction) (H)     Stress-induced cardiomyopathy     COPD with acute exacerbation (H)     Shoulder fracture     Mouth sores     Closed nondisplaced fracture of pelvis with routine healing, unspecified part of pelvis, subsequent encounter     Pelvic hematoma in female     Closed head injury, subsequent encounter     Multiple skin tears     Pneumonia of left lung due to infectious organism, unspecified part of lung     Chest pain     Coronary artery disease involving native heart with angina pectoris, unspecified vessel or lesion type (H)     Acquired hypogammaglobulinemia (H)     Chronic diastolic (congestive) heart failure (H)     Insomnia     Contusion of scalp, subsequent encounter     Essential (primary) hypertension     Fatigue     History of falling     Hypokalemia     Hyponatremia     Muscle weakness (generalized)     Other abnormalities of gait and mobility     Other injury of unspecified body region, subsequent encounter     Other symbolic dysfunctions     Weakness of left leg     Pulmonary nodules     Community acquired pneumonia, unspecified laterality     Current Behavioral Concerns: No acute concerns noted at this time.    Education Provided to patient: Role of  CC and reason for outreach.    Pain : No  Health Maintenance Reviewed: Due/Overdue   Clinical Pathway: None    Medication Management:  Medication review status: Medications reviewed and no changes reported per patient.             Functional Status:  Dependent ADLs: Independent  Dependent IADLs: Cleaning, Shopping, Transportation  Bed or wheelchair confined: No  Mobility Status: Independent w/Device    Living Situation:  Current living arrangement: I live alone, I live in assisted living  Type of residence: Independent Senior Living    Lifestyle & Psychosocial  Needs:    Social Determinants of Health     Tobacco Use: Medium Risk     Smoking Tobacco Use: Former Smoker     Smokeless Tobacco Use: Never Used   Alcohol Use:      Frequency of Alcohol Consumption:      Average Number of Drinks:      Frequency of Binge Drinking:    Financial Resource Strain: Low Risk      Difficulty of Paying Living Expenses: Not hard at all   Food Insecurity: No Food Insecurity     Worried About Running Out of Food in the Last Year: Never true     Ran Out of Food in the Last Year: Never true   Transportation Needs: No Transportation Needs     Lack of Transportation (Medical): No     Lack of Transportation (Non-Medical): No   Physical Activity:      Days of Exercise per Week:      Minutes of Exercise per Session:    Stress: No Stress Concern Present     Feeling of Stress : Not at all   Social Connections: Unknown     Frequency of Communication with Friends and Family: More than three times a week     Frequency of Social Gatherings with Friends and Family: Not on file     Attends Druze Services: Not on file     Active Member of Clubs or Organizations: Not on file     Attends Club or Organization Meetings: Not on file     Marital Status: Not on file   Intimate Partner Violence: Not At Risk     Fear of Current or Ex-Partner: No     Emotionally Abused: No     Physically Abused: No     Sexually Abused: No   Depression: Not at risk     PHQ-2 Score: 0   Housing Stability:      Unable to Pay for Housing in the Last Year:      Number of Places Lived in the Last Year:      Unstable Housing in the Last Year:      Diet: Other (soft food; with boosts and high-fiber diet.)  Inadequate nutrition : No  Tube Feeding: No  Inadequate activity/exercise : No  Significant changes in sleep pattern : No  Transportation means: Family  Financial/Insurance concerns : No  Druze or spiritual beliefs that impact treatment: No  Mental health DX: No  Mental health management concern : No  Informal Support system: Family         Call placed to Patient in follow-up to recent hospital discharge. Pt reports that she has follow-up scheduled next Wednesday with PCP. Pt is also followed by [facility]. We reviewed availability of the 24hr Nursing Line and home care with any medical questions/concerns.     Resources and Interventions:  Current Resources:   Skilled Home Care Services: Skilled Nursing, Home Health Aid  Community Resources: Home Care (Community paramedic)  Supplies used at home: Oxygen Tubing/Supplies, Nebulizer tubing  Equipment Currently Used at Home: grab bar, toilet, grab bar, tub/shower, walker, rolling  Employment Status: retired    Advance Care Plan/Directive  Advanced Care Plans/Directives on file: Yes  Type Advanced Care Plans/Directives: POLST  Advanced Care Plan/Directive Status: Not Applicable    Referrals Placed: None     Goals:   Goals        General     4. Medical (pt-stated)      Notes - Note edited  5/26/2021  1:46 PM by Rogelio Gaming LGSW     Goal Statement: I want my skin tears to heal within 9 months.  Date Goal set: 9.10.2020 (extended 5/26/2021)  Barriers: Medical conditions impacting fragility, many opportunities for injury.  Strengths: Strong support system, recognition of need, coordination with PCP office to manage cares and needs.  Date to Achieve By: 6.10.2021  Patient expressed understanding of goal: Pt reports understanding and denies any additional questions or concerns at this times. JOSIAS CH engaged in AIDET communication during encounter.    Action steps to achieve this goal:  1. I will manage wound care until PCP appointment.  2. I will follow-up with primary care regarding plan of care.  3. I will follow wound care recommendations until healed.              Patient/Caregiver understanding: Pt reports understanding and denies any additional questions or concerns at this times. JOSIAS CC engaged in AIDET communication during encounter.    Outreach Frequency: monthly  Future  Appointments              In 1 week Nehal Freeman CNP [place], [place]          Plan: Pt will access care through home care and PCP. CHW will follow-up with Patient in one month. SW CC will remain available for CC needs and will schedule a clinical review in 6 weeks.     YESSI Vela  [facility] Care Coordinator  Ph. [phone]  [email or web address]

## 2021-07-12 NOTE — LETTER
[facility] CARE COORDINATION  [address]  [place] [facility] [address]  July 12, 2021        Marie Kline  [address]  [place] [facility] [place] [address]          Dear Skyler Salazar is an updated Complex Care Plan for your continued enrollment in Care Coordination. Please let us know if you have additional questions, concerns, or goals that we can assist with.    Sincerely,        YESSI Vela  Clinic Care Coordinator  Ph. [phone]  [email or web address]  Complex Care Plan  About Me:    Patient Name:  Marie Kline    YOB: 1932  Age:         89 year old   [place] MRN:    [id number] Telephone Information:  Home Phone [phone]   Mobile [phone]       Address:  [address]  [place] [place] [address] Email address:  [email or web address]      Emergency Contact(s)    Name Relationship Lgl Grd Work Phone Home Phone Mobile Phone   1. MARICARMEN CHANDLER* Daughter No  [phone]   2. EMILIA BUCHANAN Grandchild No  [phone]   3. CAMISOLEDADPOLO, * Relative No   [phone]   4. SAWYER MUNIR* Grandchild No  [phone]           Primary language:  English     needed? No   [place] [employer]:  [phone] op. 1  Other communication barriers: None  Preferred Method of Communication:  Mail  Current living arrangement: I live alone, I live in assisted living  Mobility Status/ Medical Equipment: Independent w/Device    Health Maintenance  Health Maintenance Reviewed: Due/Overdue   Health Maintenance Due   Topic Date Due     HF ACTION PLAN  Never done     ZOSTER IMMUNIZATION (1 of 2) Never done       My Access Plan  Medical Emergency 911   Primary Clinic Line [facility] - [phone]   24 Hour Appointment Line [phone] or  [phone]) (toll-free)   24 Hour Nurse Line [phone] (toll-free)   Preferred  Urgent Care     Preferred Hospital [facility]  [phone]   Preferred Pharmacy [id number] - [place], [place] - [address]     Behavioral Health Crisis Line The National Suicide Prevention Lifeline at 1-276.366.8612 or 911     My Care Team Members  Patient Care Team       Relationship Specialty Notifications Start End    Nehal Freeman CNP PCP - General Internal Medicine  5/19/21     Phone: [phone] Fax: [phone]         [address] [facility] [address]    Isabel Ivory MD MD Oncology  2/6/17     Phone: [phone] Fax: [phone]         [place] [facility]  [address] [facility] [address] [facility] [address]    Yuliana Cast, [facility] Pharmacist Pharmacist  4/16/19     Phone: [phone] Pager: [phone]         [address] [facility] [address] [facility] [address]    Richard Hanna Community Health Worker Primary Care - CC  7/30/19     [place]; Ph: [phone], Fax: [phone]    Rogelio Gaming, [facility] Lead Care Coordinator   6/16/20     Marilee Arias [facility] Pharmacist Pharmacist  8/31/20     Phone: [phone] Fax: [phone]         [address] [facility] [address]    Kar Nuñez MD Assigned Surgical Provider   10/23/20     Phone: [phone] Fax: [phone] [address] [address]    Marva Powell MD Assigned Pulmonology Provider   5/2/21     Phone: [phone] Fax: [phone]         [address] [facility] [address] [facility] [address]    Sadiq Stein DPM Assigned Musculoskeletal Provider   5/16/21     Phone: [phone] Fax: [phone]         [address] [facility] [address] [facility] [address]    Nehal Freeman CNP Assigned PCP   6/6/21     Phone: [phone] Fax: [phone]         [address] [facility] [address]            My Care Plans  Self Management and Treatment Plan  Goals and (Comments)  Goals        General     4. Medical (pt-stated)      Notes - Note edited   5/26/2021  1:46 PM by Rogelio Gaming, ELIESER     Goal Statement: I want my skin tears to heal within 9 months.  Date Goal set: 9.10.2020 (extended 5/26/2021)  Barriers: Medical conditions impacting fragility, many opportunities for injury.  Strengths: Strong support system, recognition of need, coordination with PCP office to manage cares and needs.  Date to Achieve By: 6.10.2021  Patient expressed understanding of goal: Pt reports understanding and denies any additional questions or concerns at this times. SW CC engaged in AIDET communication during encounter.    Action steps to achieve this goal:  1. I will manage wound care until PCP appointment.  2. I will follow-up with primary care regarding plan of care.  3. I will follow wound care recommendations until healed.               Action Plans on File:     COPD     Advance Care Plans/Directives Type:   Type Advanced Care Plans/Directives: POLST    My Medical and Care Information  Problem List   Patient Active Problem List   Diagnosis     Acute and chronic respiratory failure with hypercapnia (HCC)     Nonischemic cardiomyopathy (H)     Pneumonia     Acute myocardial infarction, initial episode of care (HCC)     CLL (chronic lymphocytic leukemia) (HCC)     CHF (congestive heart failure) (HCC)     Cardiomyopathy in other diseases classified elsewhere (HCC)     Hyperlipidemia with target LDL less than 130     Health Care Home     COPD exacerbation (H)     LESLY (generalized anxiety disorder)     Hypothyroidism     Back pain with radiation     DDD (degenerative disc disease), lumbar     Splenomegaly     Lumbar degenerative disc disease     Lumbar foraminal stenosis     Central spinal stenosis     Bladder cancer (H)     Sepsis (H)     Senile osteoporosis     CKD (chronic kidney disease) stage 3, GFR 30-59 ml/min     Purpura senilis (H)     Xerosis cutis     Malignant neoplasm of urinary bladder, unspecified site (H)     Hypoxia     Femoral neck fracture (H)     S/P  total hip arthroplasty     NSTEMI (non-ST elevated myocardial infarction) (H)     Stress-induced cardiomyopathy     COPD with acute exacerbation (H)     Shoulder fracture     Mouth sores     Closed nondisplaced fracture of pelvis with routine healing, unspecified part of pelvis, subsequent encounter     Pelvic hematoma in female     Closed head injury, subsequent encounter     Multiple skin tears     Pneumonia of left lung due to infectious organism, unspecified part of lung     Chest pain     Coronary artery disease involving native heart with angina pectoris, unspecified vessel or lesion type (H)     Acquired hypogammaglobulinemia (H)     Chronic diastolic (congestive) heart failure (H)     Insomnia     Contusion of scalp, subsequent encounter     Essential (primary) hypertension     Fatigue     History of falling     Hypokalemia     Hyponatremia     Muscle weakness (generalized)     Other abnormalities of gait and mobility     Other injury of unspecified body region, subsequent encounter     Other symbolic dysfunctions     Weakness of left leg     Pulmonary nodules     Community acquired pneumonia, unspecified laterality      Current Medications and Allergies:  See printed Medication Report.  Current Outpatient Medications   Medication Instructions     albuterol (PROVENTIL HFA) 108 (90 Base) MCG/ACT inhaler 2 puffs, Inhalation, EVERY 6 HOURS PRN     albuterol (PROVENTIL) 2.5 mg, Nebulization, EVERY 6 HOURS PRN     aspirin 81 mg, Oral, DAILY     atorvastatin (LIPITOR) 20 mg, Oral, DAILY     desloratadine (CLARINEX) 5 mg, Oral, DAILY PRN     ferrous sulfate (FEROSUL) 325 mg, Oral, DAILY WITH BREAKFAST     fluticasone (FLONASE) 50 MCG/ACT nasal spray 1 spray, Both Nostrils, DAILY     furosemide (LASIX) 20 mg, Oral, DAILY     Immune Globulin, Human, (OCTAGAM) 5 GM/100ML SOLN 300 mg/kg into the vein every 30 days<BR><BR>Hold this medication while in TCU-resume at discharge from TCU     levothyroxine  (SYNTHROID/LEVOTHROID) 75 mcg, Oral, DAILY     loperamide (IMODIUM) 2 mg, Oral, DAILY PRN     metoprolol succinate ER (TOPROL-XL) 25 mg, Oral, DAILY     mometasone-formoterol (DULERA) 200-5 MCG/ACT inhaler 2 puffs, Inhalation, 2 TIMES DAILY     Multiple Vitamins-Minerals (MULTIVITAMIN ADULT) CHEW 2 chew tab, Oral, DAILY     sertraline (ZOLOFT) 50 mg, Oral, DAILY     traZODone (DESYREL) 50 mg, Oral, AT BEDTIME        Allergies   Allergen Reactions     Diagnostic X-Ray Materials Hives     CT DYE     Contrast Dye Hives     CT DYE     Prolia [Denosumab]      Osteonecrosis jaw       Wound Dressing Adhesive        Care Coordination Start Date: 5/16/2019   Frequency of Care Coordination: monthly   Form Last Updated: 07/12/2021

## 2021-07-12 NOTE — TELEPHONE ENCOUNTER
Irish advised of provider response.  Patient seems to be functioning at baseline without dizziness, weakness, falls.  Irish has encouraged good fluid intake and will see patient again on Thursday with PT visits in between nurse visits. ZAID Sin R.N.

## 2021-07-12 NOTE — TELEPHONE ENCOUNTER
This patient needs a visit as I have not seen her since May, 2021.  She was hospitalized with no follow-up. Either schedule an appt with a provider or go to ER.

## 2021-07-15 ENCOUNTER — TELEPHONE (OUTPATIENT)
Dept: INTERNAL MEDICINE | Facility: CLINIC | Age: 86
End: 2021-07-15

## 2021-07-15 DIAGNOSIS — Z53.9 DIAGNOSIS NOT YET DEFINED: Primary | ICD-10-CM

## 2021-07-15 PROCEDURE — G0180 MD CERTIFICATION HHA PATIENT: HCPCS | Performed by: NURSE PRACTITIONER

## 2021-07-21 ENCOUNTER — OFFICE VISIT (OUTPATIENT)
Dept: INTERNAL MEDICINE | Facility: CLINIC | Age: 86
End: 2021-07-21
Payer: MEDICARE

## 2021-07-21 VITALS
HEART RATE: 69 BPM | BODY MASS INDEX: 18.39 KG/M2 | DIASTOLIC BLOOD PRESSURE: 68 MMHG | WEIGHT: 91.2 LBS | HEIGHT: 59 IN | SYSTOLIC BLOOD PRESSURE: 102 MMHG | TEMPERATURE: 98.8 F | OXYGEN SATURATION: 95 %

## 2021-07-21 DIAGNOSIS — D69.6 THROMBOCYTOPENIA, UNSPECIFIED (H): ICD-10-CM

## 2021-07-21 DIAGNOSIS — J43.9 PULMONARY EMPHYSEMA, UNSPECIFIED EMPHYSEMA TYPE (H): ICD-10-CM

## 2021-07-21 DIAGNOSIS — J18.9 COMMUNITY ACQUIRED PNEUMONIA, UNSPECIFIED LATERALITY: ICD-10-CM

## 2021-07-21 DIAGNOSIS — N18.31 STAGE 3A CHRONIC KIDNEY DISEASE (H): ICD-10-CM

## 2021-07-21 DIAGNOSIS — D80.1 ACQUIRED HYPOGAMMAGLOBULINEMIA (H): ICD-10-CM

## 2021-07-21 DIAGNOSIS — I50.9 CHRONIC CONGESTIVE HEART FAILURE, UNSPECIFIED HEART FAILURE TYPE (H): ICD-10-CM

## 2021-07-21 DIAGNOSIS — R19.7 DIARRHEA, UNSPECIFIED TYPE: Primary | ICD-10-CM

## 2021-07-21 PROCEDURE — 99214 OFFICE O/P EST MOD 30 MIN: CPT | Performed by: NURSE PRACTITIONER

## 2021-07-21 ASSESSMENT — MIFFLIN-ST. JEOR: SCORE: 744.31

## 2021-07-21 NOTE — PROGRESS NOTES
Assessment & Plan     Diarrhea, unspecified type  - Enteric Bacteria and Virus Panel by [facility] Stool; Future  - CLOSTRIDIUM DIFFICILE TOXIN B PCR; Future  - C DIFFICILE CULTURE; Future  - Enteric Bacteria and Virus Panel by [facility] Stool  - CLOSTRIDIUM DIFFICILE TOXIN B PCR  - C DIFFICILE CULTURE  - recommended OTC Miralax daily for now for diarrhea    Community acquired pneumonia, unspecified laterality  - was on Levaquin and doing better    Pulmonary emphysema, unspecified emphysema type (H)  - followed by pulmonary    Chronic congestive heart failure, unspecified heart failure type (H) with Stage 3a chronic kidney disease  - being monitored  - ACE/ARB/ARNI NOT PRESCRIBED (INTENTIONAL); Please choose reason not prescribed from choices below.    Acquired hypogammaglobulinemia (H) with Thrombocytopenia, unspecified (H)  - followed by Oncology and will be receiving infusion on Friday  - assume labs will be done so none ordered today    Addendum:  Due to above health issues listed and recent hospitalization, this patient is currently homebound and being followed by homecare. Need to fax office visit with addendum to [phone].  :[id number]}     Patient Instructions   Go to [address] to  stool specimens    Recommended over the counter Miralax 1 scoop daily until diarrhea resolves    Make sure you have a lab called CBC to check the levels with the oncologist before your infusion that is usually done.      Return in about 2 months (around 9/21/2021) for Follow up, with me.    Nehal Freeman CNP  [facility] MARK Salazar is a 89 year old who presents for the following health issues  accompanied by her son and son-in-law:    [facility]       Hospital Follow-up Visit:    Hospital/Nursing Home/IP Rehab Facility: [facility]  Date of Admission: 7/4/21  Date of Discharge: 7/7/21  Reason(s) for Admission: COPD, pneumonia      Was your hospitalization related to COVID-19? No    Problems taking medications regularly:  None  Medication changes since discharge: Was given nebulizer   Problems adhering to non-medication therapy:  None    Summary of hospitalization:  [facility] discharge summary reviewed  Diagnostic Tests/Treatments reviewed.  Follow up needed: none  Other Healthcare Providers Involved in Patient s Care:         seeing oncology and Pulmonary  Update since discharge: improved. Post Discharge Medication Reconciliation: discharge medications reconciled, continue medications without change.  Plan of care communicated with patient and family              See above.  Here for f/u on hospitalization from 7/4 to 7/7/2021.  Doing ok other than reporting liquid diarrhea sometimes explosive with 3 happening today. Was treated with Levaquin and Prednisone upon discharge.  Dx: Respiratory failure secondary to COPD with CAP, chronic HF, and CLL and followed by oncology.  She reports she saw Pulmonary last week who changed her to some neb treatments and she is currently wearing 02 at 2 L continuously. Has stopped milk products and taking boost.    Labs (7/7/2021)    CBC with WBC 48.8 H, Hgb 8.8 L, and Plt 77 L    BMP essentially ok    Imaging:    CXR showed:    IMPRESSION: Emphysema. New left upper lobe patchy opacity. Increased  interstitial opacities throughout the lungs. This could represent  pneumonia and/or pulmonary edema. A follow-up chest CT or radiograph  after treatment to ensure resolution is recommended. Linear scarring  in the left midlung has also increased. No pleural effusion or  pneumothorax. Normal heart size. Thoracic scoliosis.    No fever or cough.  Intermittent shortness of breathe but no chest pain.  No urination issues.    Gets an infusion on Friday through oncology for CLL with acquired hypogammaglobulinemia, thrombocytopenia.    Review of Systems   Constitutional, HEENT, cardiovascular, pulmonary, gi and gu systems are negative, except as otherwise  "noted.      Objective    /68 (BP Location: Left arm, Patient Position: Sitting, Cuff Size: Adult Regular)   Pulse 69   Temp 98.8  F (37.1  C) (Oral)   Ht 1.499 m (4' 11\")   Wt 41.4 kg (91 lb 3.2 oz)   LMP  (LMP Unknown)   SpO2 95%   BMI 18.42 kg/m    Body mass index is 18.42 kg/m .     Physical Exam   GENERAL: alert and no distress; accompanied by son and son in law  RESP: lungs diminished with fine rhonchi noted to auscultation - no rales or wheezes  CV: regular rate and rhythm, normal S1 S2, no S3 or S4, no murmur, click or rub, no peripheral edema and peripheral pulses strong  ABDOMEN: soft, nontender, no hepatosplenomegaly, no masses and bowel sounds normal  MS: no gross musculoskeletal defects noted, no edema; uses walker to get around  SKIN: no suspicious lesions or rashes; some skin bruising on upper arms              "

## 2021-07-21 NOTE — PATIENT INSTRUCTIONS
Go to [address] to  stool specimens    Recommended over the counter Miralax 1 scoop daily until diarrhea resolves    Make sure you have a lab called CBC to check the levels with the oncologist before your infusion that is usually done.

## 2021-07-22 ENCOUNTER — TELEPHONE (OUTPATIENT)
Dept: INTERNAL MEDICINE | Facility: CLINIC | Age: 86
End: 2021-07-22

## 2021-07-22 NOTE — TELEPHONE ENCOUNTER
Reason for Call:  Other Face to Face Visit Notes    Detailed comments: Taylor from [facility] called to request the visit notes from the patient's appointment on 7/21 with Nehal Freeman CNP.   Please fax to [phone]    Phone Number Patient can be reached at: Other phone number:  [phone]    Best Time: any    Can we leave a detailed message on this number? YES    Call taken on 7/22/2021 at 2:02 PM by Cassandra Patrick

## 2021-07-23 ENCOUNTER — TRANSFERRED RECORDS (OUTPATIENT)
Dept: HEALTH INFORMATION MANAGEMENT | Facility: CLINIC | Age: 86
End: 2021-07-23

## 2021-07-23 LAB — C DIFF TOX B STL QL: NEGATIVE

## 2021-07-23 PROCEDURE — 87493 C DIFF AMPLIFIED PROBE: CPT | Performed by: NURSE PRACTITIONER

## 2021-07-25 PROCEDURE — 87506 IADNA-DNA/RNA PROBE TQ 6-11: CPT | Performed by: NURSE PRACTITIONER

## 2021-07-28 ENCOUNTER — TELEPHONE (OUTPATIENT)
Dept: INTERNAL MEDICINE | Facility: CLINIC | Age: 86
End: 2021-07-28

## 2021-07-28 NOTE — TELEPHONE ENCOUNTER
Mayur from [facility] calling for verbal orders for PT, move discharge visit from this week to next week. Mayur can be reached at  [phone]

## 2021-08-03 ENCOUNTER — TELEPHONE (OUTPATIENT)
Dept: INTERNAL MEDICINE | Facility: CLINIC | Age: 86
End: 2021-08-03

## 2021-08-03 NOTE — TELEPHONE ENCOUNTER
Irish from [facility] calling.  She is requesting the visit from 7/21/2021 be addendum to reflect her homebound status and reason being treated with homecare. This needs to be faxed to [phone]  Ok to call and  [phone]

## 2021-08-04 ENCOUNTER — TELEPHONE (OUTPATIENT)
Dept: INTERNAL MEDICINE | Facility: CLINIC | Age: 86
End: 2021-08-04

## 2021-08-10 ENCOUNTER — TELEPHONE (OUTPATIENT)
Dept: INTERNAL MEDICINE | Facility: CLINIC | Age: 86
End: 2021-08-10

## 2021-08-10 NOTE — TELEPHONE ENCOUNTER
[place] [facility]  Cert of face to face encounter for homecare  Verito Pastrana in basket  Fax

## 2021-08-11 ENCOUNTER — MEDICAL CORRESPONDENCE (OUTPATIENT)
Dept: HEALTH INFORMATION MANAGEMENT | Facility: CLINIC | Age: 86
End: 2021-08-11

## 2021-08-19 ENCOUNTER — PATIENT OUTREACH (OUTPATIENT)
Dept: CARE COORDINATION | Facility: CLINIC | Age: 86
End: 2021-08-19

## 2021-08-19 ENCOUNTER — MEDICAL CORRESPONDENCE (OUTPATIENT)
Dept: HEALTH INFORMATION MANAGEMENT | Facility: CLINIC | Age: 86
End: 2021-08-19

## 2021-08-19 NOTE — PROGRESS NOTES
Clinic Care Coordination Contact  [facility]/Voicemail       Clinical Data: Care Coordinator Outreach  Outreach attempted x 1.  Left message on patient's voicemail with call back information and requested return call.    Plan:  Care Coordinator will try to reach patient again in 10 business days.    KOREY Lawrence  Clinic Care Coordination  [facility] : [place], [place], and [place]  Phone: [phone]    ______________________  Next Outreach:  09/02/21  Planned Outreach Frequency: Monthly  Preferred Phone Number: [phone]    Last Assessment Date: 05/16/19  Care Plan Completion Date: 07/12/21  ______________________

## 2021-08-24 ENCOUNTER — NURSE TRIAGE (OUTPATIENT)
Dept: INTERNAL MEDICINE | Facility: CLINIC | Age: 86
End: 2021-08-24

## 2021-08-24 DIAGNOSIS — J44.1 COPD WITH ACUTE EXACERBATION (H): Primary | ICD-10-CM

## 2021-08-24 RX ORDER — AZITHROMYCIN 250 MG/1
TABLET, FILM COATED ORAL
Qty: 6 TABLET | Refills: 0 | Status: SHIPPED | OUTPATIENT
Start: 2021-08-24 | End: 2021-08-29

## 2021-08-24 NOTE — TELEPHONE ENCOUNTER
"S-(situation): Pt has been having a productive green cough with no fever.     B-(background): Recent hospitalization in July for pneumonia but this does not feel like that.      A-(assessment): patient is at increased risk for pneumonia and should be assessed    R-(recommendations): patient should be seen in clinic but she is requesting a z-lolita to be sent to her pharmacy      Answer Assessment - Initial Assessment Questions  1. ONSET: \"When did the cough begin?\"       \"Couple of weeks at least\"  2. SEVERITY: \"How bad is the cough today?\"       \"I just cough all the time because I have COPD\"  3. RESPIRATORY DISTRESS: \"Describe your breathing.\"       No changes from baseline  4. FEVER: \"Do you have a fever?\" If so, ask: \"What is your temperature, how was it measured, and when did it start?\"      no  5. HEMOPTYSIS: \"Are you coughing up any blood?\" If so ask: \"How much?\" (flecks, streaks, tablespoons, etc.)      no  6. TREATMENT: \"What have you done so far to treat the cough?\" (e.g., meds, fluids, humidifier)      Nebulizer 3x/day but nothing else  7. CARDIAC HISTORY: \"Do you have any history of heart disease?\" (e.g., heart attack, congestive heart failure)       no  8. LUNG HISTORY: \"Do you have any history of lung disease?\"  (e.g., pulmonary embolus, asthma, emphysema)      COPD  9. PE RISK FACTORS: \"Do you have a history of blood clots?\" (or: recent major surgery, recent prolonged travel, bedridden)      *No Answer*  10. OTHER SYMPTOMS: \"Do you have any other symptoms? (e.g., runny nose, wheezing, chest pain)        No wheezing  11. PREGNANCY: \"Is there any chance you are pregnant?\" \"When was your last menstrual period?\"        n/a  12. TRAVEL: \"Have you traveled out of the country in the last month?\" (e.g., travel history, exposures)        no    Protocols used: COUGH-A-OH      "

## 2021-08-24 NOTE — TELEPHONE ENCOUNTER
I sent a Rx for Azithromycin (Zpac) to her pharmacy at [facility] in [place].  If symptoms persist or worsen, she will need to be seen for an appointment or go to UC or ER for further evaluation and treatment.  Notify the patient.

## 2021-08-31 ENCOUNTER — PATIENT OUTREACH (OUTPATIENT)
Dept: CARE COORDINATION | Facility: CLINIC | Age: 86
End: 2021-08-31

## 2021-08-31 ASSESSMENT — ACTIVITIES OF DAILY LIVING (ADL): DEPENDENT_IADLS:: CLEANING;SHOPPING;TRANSPORTATION

## 2021-08-31 NOTE — PROGRESS NOTES
Care Coordination Clinician Chart Review  Situation: Patient chart reviewed by care coordinator.       Background: Care Coordination initial assessment and enrollment to Care Coordination was 5.16.2019.   Patient centered goals were developed with participation from patient.  JOSIAS CH handed patient off to CHW for continued outreach every 30 days.        Assessment: Per chart review, patient outreach completed by CC CHW on 8/19/2021 (unreachable).   Patient's goal remains appropriate and relevant at this time.   Patient is not due for updated Plan of Care.  Annual assessment will be due 7/2022.      Goals        Patient Stated       4. Medical (pt-stated)       Goal Statement: I want my skin tears to heal within 12 months.  Date Goal set: 9.10.2020 (extended 8/31/2021)  Barriers: Medical conditions impacting fragility, many opportunities for injury.  Strengths: Strong support system, recognition of need, coordination with PCP office to manage cares and needs.  Date to Achieve By: 9.10.2021  Patient expressed understanding of goal: Pt reports understanding and denies any additional questions or concerns at this times. JOSIAS CH engaged in AIDET communication during encounter.    Action steps to achieve this goal:  1. I will manage wound care until PCP appointment.  2. I will follow-up with primary care regarding plan of care.  3. I will follow wound care recommendations until healed.                  Plan/Recommendations: The patient will continue working with Care Coordination to achieve goal as above.  CHW will involve JOSIAS CH as needed or if patient is ready to move to maintenance.  JOSIAS CC will continue to monitor progress to goals and CHW outreaches every 6 weeks.   Plan of Care updated and mailed to patient: Subha [facility]  Clinic Care Coordinator  Ph. [phone]  [email or web address]

## 2021-09-13 ENCOUNTER — TELEPHONE (OUTPATIENT)
Dept: INTERNAL MEDICINE | Facility: CLINIC | Age: 86
End: 2021-09-13

## 2021-09-13 NOTE — TELEPHONE ENCOUNTER
Reason for Call:  Other call back    Detailed comments: Pt states that she tested positive for Covid-19 today, 9/13/2021 and not sure if she should reschedule her appt that she has 9/22/2021    Phone Number Patient can be reached at: Home number on file [phone] (home)    Best Time: anytime    Can we leave a detailed message on this number? YES    Call taken on 9/13/2021 at 2:47 PM by Keri Harris

## 2021-09-14 NOTE — TELEPHONE ENCOUNTER
"As part of [facility] protocol they test residents of their facility for Covid 19.  Patient states she had not been feeling ill when she tested Covid positive 9/13/21.  Patient states she had her infusion \"for leukemia\" Fri 9/10 and was there 9am-3 p.m.  She came home that afternoon feeling very tired, slept 2 hours and when she woke up she still felt exhausted and continued to feel exhausted all weekend, sleeping through much of the weekend.  She denies cough or shortness of breath beyond her baseline, no loss of smell or taste, denies headache, sorethroat or rash.  Temperature has ranged from 99.3-100.3 F using digital oral thermometer.    Advised patient that she needs to isolate for 10 days and be fever free without use of Tylenol or Advil for a full 24 hours before she can leave her home.  Patient expressed understanding and will call with any further questions.  ZAID Sin R.N.    "

## 2021-09-16 ENCOUNTER — PATIENT OUTREACH (OUTPATIENT)
Dept: CARE COORDINATION | Facility: CLINIC | Age: 86
End: 2021-09-16

## 2021-09-16 NOTE — PROGRESS NOTES
Clinic Care Coordination Contact  [facility]/Voicemail       Clinical Data: Care Coordinator Outreach  Outreach attempted x 2.  Left message on patient's voicemail with call back information and requested return call.    Plan:  Care Coordinator will try to reach patient again in 10 business days.    KOREY Lawrence  Clinic Care Coordination  [facility] : [place], [place], and [place]  Phone: [phone]    ______________________  Next Outreach:  10/01/21  Planned Outreach Frequency: Monthly  Preferred Phone Number: [phone]    Last Assessment Date: 05/16/19  Care Plan Completion Date: 07/12/21  ______________________

## 2021-09-20 ENCOUNTER — HOSPITAL ENCOUNTER (INPATIENT)
Facility: CLINIC | Age: 86
LOS: 5 days | Discharge: HOME HEALTH | DRG: 177 | End: 2021-09-25
Attending: EMERGENCY MEDICINE | Admitting: INTERNAL MEDICINE
Payer: MEDICARE

## 2021-09-20 ENCOUNTER — APPOINTMENT (OUTPATIENT)
Dept: GENERAL RADIOLOGY | Facility: CLINIC | Age: 86
DRG: 177 | End: 2021-09-20
Attending: EMERGENCY MEDICINE
Payer: MEDICARE

## 2021-09-20 DIAGNOSIS — Z91.81 HISTORY OF FALLING: ICD-10-CM

## 2021-09-20 DIAGNOSIS — M62.81 MUSCLE WEAKNESS (GENERALIZED): ICD-10-CM

## 2021-09-20 DIAGNOSIS — J44.1 COPD EXACERBATION (H): ICD-10-CM

## 2021-09-20 DIAGNOSIS — J12.82 PNEUMONIA DUE TO 2019 NOVEL CORONAVIRUS: ICD-10-CM

## 2021-09-20 DIAGNOSIS — U07.1 PNEUMONIA DUE TO 2019 NOVEL CORONAVIRUS: ICD-10-CM

## 2021-09-20 DIAGNOSIS — J18.9 COMMUNITY ACQUIRED PNEUMONIA, UNSPECIFIED LATERALITY: Primary | ICD-10-CM

## 2021-09-20 DIAGNOSIS — J44.1 COPD WITH ACUTE EXACERBATION (H): ICD-10-CM

## 2021-09-20 DIAGNOSIS — U07.1 2019 NOVEL CORONAVIRUS DISEASE (COVID-19): ICD-10-CM

## 2021-09-20 DIAGNOSIS — R09.02 HYPOXIA: ICD-10-CM

## 2021-09-20 DIAGNOSIS — K29.00 OTHER ACUTE GASTRITIS WITHOUT HEMORRHAGE: ICD-10-CM

## 2021-09-20 LAB
ALBUMIN SERPL-MCNC: 2.7 G/DL (ref 3.4–5)
ALP SERPL-CCNC: 104 U/L (ref 40–150)
ALT SERPL W P-5'-P-CCNC: 105 U/L (ref 0–50)
ANION GAP SERPL CALCULATED.3IONS-SCNC: 6 MMOL/L (ref 3–14)
ANION GAP SERPL CALCULATED.3IONS-SCNC: 8 MMOL/L (ref 3–14)
AST SERPL W P-5'-P-CCNC: 131 U/L (ref 0–45)
ATRIAL RATE - MUSE: 90 BPM
BASE EXCESS BLDV CALC-SCNC: 5.9 MMOL/L (ref -7.7–1.9)
BASOPHILS # BLD MANUAL: 0 10E3/UL (ref 0–0.2)
BASOPHILS NFR BLD MANUAL: 0 %
BILIRUB DIRECT SERPL-MCNC: 0.2 MG/DL (ref 0–0.2)
BILIRUB SERPL-MCNC: 0.6 MG/DL (ref 0.2–1.3)
BUN SERPL-MCNC: 17 MG/DL (ref 7–30)
BUN SERPL-MCNC: 17 MG/DL (ref 7–30)
CALCIUM SERPL-MCNC: 8.9 MG/DL (ref 8.5–10.1)
CALCIUM SERPL-MCNC: 8.9 MG/DL (ref 8.5–10.1)
CHLORIDE BLD-SCNC: 95 MMOL/L (ref 94–109)
CHLORIDE BLD-SCNC: 95 MMOL/L (ref 94–109)
CO2 SERPL-SCNC: 29 MMOL/L (ref 20–32)
CO2 SERPL-SCNC: 29 MMOL/L (ref 20–32)
CREAT SERPL-MCNC: 0.71 MG/DL (ref 0.52–1.04)
CREAT SERPL-MCNC: 0.72 MG/DL (ref 0.52–1.04)
CREAT SERPL-MCNC: 0.79 MG/DL (ref 0.52–1.04)
DIASTOLIC BLOOD PRESSURE - MUSE: NORMAL MMHG
EOSINOPHIL # BLD MANUAL: 0 10E3/UL (ref 0–0.7)
EOSINOPHIL NFR BLD MANUAL: 0 %
ERYTHROCYTE [DISTWIDTH] IN BLOOD BY AUTOMATED COUNT: 16.1 % (ref 10–15)
GFR SERPL CREATININE-BSD FRML MDRD: 67 ML/MIN/1.73M2
GFR SERPL CREATININE-BSD FRML MDRD: 74 ML/MIN/1.73M2
GFR SERPL CREATININE-BSD FRML MDRD: 76 ML/MIN/1.73M2
GLUCOSE BLD-MCNC: 115 MG/DL (ref 70–99)
GLUCOSE BLD-MCNC: 116 MG/DL (ref 70–99)
HCO3 BLDV-SCNC: 31 MMOL/L (ref 21–28)
HCT VFR BLD AUTO: 34 % (ref 35–47)
HGB BLD-MCNC: 10.8 G/DL (ref 11.7–15.7)
INTERPRETATION ECG - MUSE: NORMAL
LYMPHOCYTES # BLD MANUAL: 87.3 10E3/UL (ref 0.8–5.3)
LYMPHOCYTES NFR BLD MANUAL: 96 %
MCH RBC QN AUTO: 29.6 PG (ref 26.5–33)
MCHC RBC AUTO-ENTMCNC: 31.8 G/DL (ref 31.5–36.5)
MCV RBC AUTO: 93 FL (ref 78–100)
MONOCYTES # BLD MANUAL: 0.9 10E3/UL (ref 0–1.3)
MONOCYTES NFR BLD MANUAL: 1 %
NEUTROPHILS # BLD MANUAL: 2.7 10E3/UL (ref 1.6–8.3)
NEUTROPHILS NFR BLD MANUAL: 3 %
NT-PROBNP SERPL-MCNC: 661 PG/ML (ref 0–1800)
O2/TOTAL GAS SETTING VFR VENT: 93 %
P AXIS - MUSE: 36 DEGREES
PCO2 BLDV: 44 MM HG (ref 40–50)
PH BLDV: 7.45 [PH] (ref 7.32–7.43)
PLAT MORPH BLD: ABNORMAL
PLATELET # BLD AUTO: 149 10E3/UL (ref 150–450)
PO2 BLDV: 37 MM HG (ref 25–47)
POTASSIUM BLD-SCNC: 4 MMOL/L (ref 3.4–5.3)
POTASSIUM BLD-SCNC: 4 MMOL/L (ref 3.4–5.3)
PR INTERVAL - MUSE: 186 MS
PROCALCITONIN SERPL-MCNC: 0.13 NG/ML
PROCALCITONIN SERPL-MCNC: 0.15 NG/ML
PROT SERPL-MCNC: 6.7 G/DL (ref 6.8–8.8)
QRS DURATION - MUSE: 88 MS
QT - MUSE: 366 MS
QTC - MUSE: 447 MS
R AXIS - MUSE: 59 DEGREES
RBC # BLD AUTO: 3.65 10E6/UL (ref 3.8–5.2)
RBC MORPH BLD: ABNORMAL
SARS-COV-2 RNA RESP QL NAA+PROBE: POSITIVE
SMUDGE CELLS BLD QL SMEAR: PRESENT
SODIUM SERPL-SCNC: 130 MMOL/L (ref 133–144)
SODIUM SERPL-SCNC: 132 MMOL/L (ref 133–144)
SYSTOLIC BLOOD PRESSURE - MUSE: NORMAL MMHG
T AXIS - MUSE: 65 DEGREES
TROPONIN I SERPL-MCNC: <0.015 UG/L (ref 0–0.04)
TROPONIN I SERPL-MCNC: <0.015 UG/L (ref 0–0.04)
VENTRICULAR RATE- MUSE: 90 BPM
WBC # BLD AUTO: 90.9 10E3/UL (ref 4–11)

## 2021-09-20 PROCEDURE — 250N000011 HC RX IP 250 OP 636: Performed by: EMERGENCY MEDICINE

## 2021-09-20 PROCEDURE — 250N000013 HC RX MED GY IP 250 OP 250 PS 637: Performed by: INTERNAL MEDICINE

## 2021-09-20 PROCEDURE — 99223 1ST HOSP IP/OBS HIGH 75: CPT | Mod: AI | Performed by: INTERNAL MEDICINE

## 2021-09-20 PROCEDURE — 93005 ELECTROCARDIOGRAM TRACING: CPT

## 2021-09-20 PROCEDURE — 82565 ASSAY OF CREATININE: CPT | Performed by: INTERNAL MEDICINE

## 2021-09-20 PROCEDURE — 250N000011 HC RX IP 250 OP 636: Performed by: INTERNAL MEDICINE

## 2021-09-20 PROCEDURE — 99285 EMERGENCY DEPT VISIT HI MDM: CPT | Mod: 25

## 2021-09-20 PROCEDURE — 87040 BLOOD CULTURE FOR BACTERIA: CPT | Performed by: EMERGENCY MEDICINE

## 2021-09-20 PROCEDURE — 84484 ASSAY OF TROPONIN QUANT: CPT | Performed by: EMERGENCY MEDICINE

## 2021-09-20 PROCEDURE — 87635 SARS-COV-2 COVID-19 AMP PRB: CPT | Performed by: EMERGENCY MEDICINE

## 2021-09-20 PROCEDURE — 84145 PROCALCITONIN (PCT): CPT | Performed by: INTERNAL MEDICINE

## 2021-09-20 PROCEDURE — 82803 BLOOD GASES ANY COMBINATION: CPT | Performed by: EMERGENCY MEDICINE

## 2021-09-20 PROCEDURE — 82374 ASSAY BLOOD CARBON DIOXIDE: CPT | Performed by: EMERGENCY MEDICINE

## 2021-09-20 PROCEDURE — 80048 BASIC METABOLIC PNL TOTAL CA: CPT | Performed by: EMERGENCY MEDICINE

## 2021-09-20 PROCEDURE — 250N000012 HC RX MED GY IP 250 OP 636 PS 637: Performed by: INTERNAL MEDICINE

## 2021-09-20 PROCEDURE — 120N000001 HC R&B MED SURG/OB

## 2021-09-20 PROCEDURE — 85027 COMPLETE CBC AUTOMATED: CPT | Performed by: EMERGENCY MEDICINE

## 2021-09-20 PROCEDURE — 71045 X-RAY EXAM CHEST 1 VIEW: CPT

## 2021-09-20 PROCEDURE — 36415 COLL VENOUS BLD VENIPUNCTURE: CPT | Performed by: EMERGENCY MEDICINE

## 2021-09-20 PROCEDURE — 96374 THER/PROPH/DIAG INJ IV PUSH: CPT

## 2021-09-20 PROCEDURE — C9803 HOPD COVID-19 SPEC COLLECT: HCPCS

## 2021-09-20 PROCEDURE — 84145 PROCALCITONIN (PCT): CPT | Performed by: EMERGENCY MEDICINE

## 2021-09-20 PROCEDURE — 82248 BILIRUBIN DIRECT: CPT | Performed by: EMERGENCY MEDICINE

## 2021-09-20 PROCEDURE — 83880 ASSAY OF NATRIURETIC PEPTIDE: CPT | Performed by: EMERGENCY MEDICINE

## 2021-09-20 PROCEDURE — 36415 COLL VENOUS BLD VENIPUNCTURE: CPT | Performed by: INTERNAL MEDICINE

## 2021-09-20 RX ORDER — LEVOTHYROXINE SODIUM 75 UG/1
75 TABLET ORAL DAILY
Status: DISCONTINUED | OUTPATIENT
Start: 2021-09-21 | End: 2021-09-25 | Stop reason: HOSPADM

## 2021-09-20 RX ORDER — METHYLPREDNISOLONE SODIUM SUCCINATE 125 MG/2ML
125 INJECTION, POWDER, LYOPHILIZED, FOR SOLUTION INTRAMUSCULAR; INTRAVENOUS ONCE
Status: COMPLETED | OUTPATIENT
Start: 2021-09-20 | End: 2021-09-20

## 2021-09-20 RX ORDER — IPRATROPIUM BROMIDE AND ALBUTEROL SULFATE 2.5; .5 MG/3ML; MG/3ML
1 SOLUTION RESPIRATORY (INHALATION) 3 TIMES DAILY
COMMUNITY
End: 2022-06-09

## 2021-09-20 RX ORDER — ACETAMINOPHEN 650 MG/1
650 SUPPOSITORY RECTAL EVERY 6 HOURS PRN
Status: DISCONTINUED | OUTPATIENT
Start: 2021-09-20 | End: 2021-09-25 | Stop reason: HOSPADM

## 2021-09-20 RX ORDER — METOPROLOL SUCCINATE 25 MG/1
25 TABLET, EXTENDED RELEASE ORAL DAILY
Status: DISCONTINUED | OUTPATIENT
Start: 2021-09-20 | End: 2021-09-25 | Stop reason: HOSPADM

## 2021-09-20 RX ORDER — ONDANSETRON 4 MG/1
4 TABLET, ORALLY DISINTEGRATING ORAL EVERY 6 HOURS PRN
Status: DISCONTINUED | OUTPATIENT
Start: 2021-09-20 | End: 2021-09-25 | Stop reason: HOSPADM

## 2021-09-20 RX ORDER — FLUTICASONE PROPIONATE 50 MCG
1 SPRAY, SUSPENSION (ML) NASAL DAILY PRN
Status: DISCONTINUED | OUTPATIENT
Start: 2021-09-20 | End: 2021-09-25 | Stop reason: HOSPADM

## 2021-09-20 RX ORDER — FUROSEMIDE 20 MG
20 TABLET ORAL DAILY
Status: DISCONTINUED | OUTPATIENT
Start: 2021-09-21 | End: 2021-09-25 | Stop reason: HOSPADM

## 2021-09-20 RX ORDER — ALBUTEROL SULFATE 90 UG/1
2 AEROSOL, METERED RESPIRATORY (INHALATION) EVERY 6 HOURS PRN
Status: DISCONTINUED | OUTPATIENT
Start: 2021-09-20 | End: 2021-09-25 | Stop reason: HOSPADM

## 2021-09-20 RX ORDER — ATORVASTATIN CALCIUM 20 MG/1
20 TABLET, FILM COATED ORAL AT BEDTIME
Status: DISCONTINUED | OUTPATIENT
Start: 2021-09-20 | End: 2021-09-25 | Stop reason: HOSPADM

## 2021-09-20 RX ORDER — AMOXICILLIN 250 MG
2 CAPSULE ORAL 2 TIMES DAILY PRN
Status: DISCONTINUED | OUTPATIENT
Start: 2021-09-20 | End: 2021-09-25 | Stop reason: HOSPADM

## 2021-09-20 RX ORDER — ONDANSETRON 2 MG/ML
4 INJECTION INTRAMUSCULAR; INTRAVENOUS EVERY 6 HOURS PRN
Status: DISCONTINUED | OUTPATIENT
Start: 2021-09-20 | End: 2021-09-25 | Stop reason: HOSPADM

## 2021-09-20 RX ORDER — ALBUTEROL SULFATE 0.83 MG/ML
2.5 SOLUTION RESPIRATORY (INHALATION) EVERY 6 HOURS PRN
Status: DISCONTINUED | OUTPATIENT
Start: 2021-09-20 | End: 2021-09-24

## 2021-09-20 RX ORDER — BUDESONIDE 0.5 MG/2ML
0.5 INHALANT ORAL 2 TIMES DAILY
COMMUNITY
End: 2022-05-16

## 2021-09-20 RX ORDER — LIDOCAINE 40 MG/G
CREAM TOPICAL
Status: DISCONTINUED | OUTPATIENT
Start: 2021-09-20 | End: 2021-09-25 | Stop reason: HOSPADM

## 2021-09-20 RX ORDER — AMOXICILLIN 250 MG
1 CAPSULE ORAL 2 TIMES DAILY PRN
Status: DISCONTINUED | OUTPATIENT
Start: 2021-09-20 | End: 2021-09-25 | Stop reason: HOSPADM

## 2021-09-20 RX ORDER — LOPERAMIDE HCL 2 MG
2 CAPSULE ORAL DAILY PRN
Status: DISCONTINUED | OUTPATIENT
Start: 2021-09-20 | End: 2021-09-25 | Stop reason: HOSPADM

## 2021-09-20 RX ORDER — ACETAMINOPHEN 325 MG/1
650 TABLET ORAL EVERY 6 HOURS PRN
Status: DISCONTINUED | OUTPATIENT
Start: 2021-09-20 | End: 2021-09-25 | Stop reason: HOSPADM

## 2021-09-20 RX ORDER — ASPIRIN 81 MG/1
81 TABLET ORAL DAILY
Status: DISCONTINUED | OUTPATIENT
Start: 2021-09-21 | End: 2021-09-25 | Stop reason: HOSPADM

## 2021-09-20 RX ORDER — PANTOPRAZOLE SODIUM 40 MG/1
40 TABLET, DELAYED RELEASE ORAL
Status: DISCONTINUED | OUTPATIENT
Start: 2021-09-20 | End: 2021-09-25 | Stop reason: HOSPADM

## 2021-09-20 RX ORDER — TRAZODONE HYDROCHLORIDE 50 MG/1
50 TABLET, FILM COATED ORAL AT BEDTIME
Status: DISCONTINUED | OUTPATIENT
Start: 2021-09-20 | End: 2021-09-25 | Stop reason: HOSPADM

## 2021-09-20 RX ADMIN — ATORVASTATIN CALCIUM 20 MG: 20 TABLET, FILM COATED ORAL at 22:07

## 2021-09-20 RX ADMIN — METHYLPREDNISOLONE SODIUM SUCCINATE 125 MG: 125 INJECTION, POWDER, FOR SOLUTION INTRAMUSCULAR; INTRAVENOUS at 11:12

## 2021-09-20 RX ADMIN — ACETAMINOPHEN 650 MG: 325 TABLET, FILM COATED ORAL at 19:03

## 2021-09-20 RX ADMIN — TRAZODONE HYDROCHLORIDE 50 MG: 50 TABLET ORAL at 22:07

## 2021-09-20 RX ADMIN — ENOXAPARIN SODIUM 40 MG: 40 INJECTION SUBCUTANEOUS at 19:06

## 2021-09-20 RX ADMIN — DEXAMETHASONE 6 MG: 2 TABLET ORAL at 19:01

## 2021-09-20 RX ADMIN — PANTOPRAZOLE SODIUM 40 MG: 40 TABLET, DELAYED RELEASE ORAL at 19:03

## 2021-09-20 RX ADMIN — SERTRALINE HYDROCHLORIDE 50 MG: 50 TABLET ORAL at 22:07

## 2021-09-20 RX ADMIN — METOPROLOL SUCCINATE 25 MG: 25 TABLET, EXTENDED RELEASE ORAL at 19:04

## 2021-09-20 ASSESSMENT — ACTIVITIES OF DAILY LIVING (ADL)
EQUIPMENT_CURRENTLY_USED_AT_HOME: WALKER, ROLLING
CONCENTRATING,_REMEMBERING_OR_MAKING_DECISIONS_DIFFICULTY: NO
FALL_HISTORY_WITHIN_LAST_SIX_MONTHS: NO
DRESSING/BATHING_DIFFICULTY: NO
WALKING_OR_CLIMBING_STAIRS: AMBULATION DIFFICULTY, REQUIRES EQUIPMENT
DIFFICULTY_COMMUNICATING: NO
HEARING_DIFFICULTY_OR_DEAF: NO
WEAR_GLASSES_OR_BLIND: YES
ADLS_ACUITY_SCORE: 11
DIFFICULTY_EATING/SWALLOWING: NO
WALKING_OR_CLIMBING_STAIRS_DIFFICULTY: NO
DOING_ERRANDS_INDEPENDENTLY_DIFFICULTY: NO
PATIENT_/_FAMILY_COMMUNICATION_STYLE: SPOKEN LANGUAGE (ENGLISH OR BILINGUAL)

## 2021-09-20 ASSESSMENT — MIFFLIN-ST. JEOR: SCORE: 668.1

## 2021-09-20 NOTE — ED NOTES
[facility]  ED Nurse Handoff Report    Marie Kline is a 89 year old female   ED Chief complaint: Fever and Shortness of Breath  . ED Diagnosis:   Final diagnoses:   2019 novel coronavirus disease (COVID-19)   COPD exacerbation (H)   Hypoxia     Allergies:   Allergies   Allergen Reactions     Diagnostic X-Ray Materials Hives     CT DYE     Contrast Dye Hives     CT DYE     Prolia [Denosumab]      Osteonecrosis jaw       Wound Dressing Adhesive        Code Status: Full Code  Activity level - Baseline/Home:  Assist X 1. Activity Level - Current:   Assist X 1. Lift room needed: No. Bariatric: No   Needed: No   Isolation: Yes. Infection: Not Applicable  COVID r/o and special precautions.     Vital Signs:   Vitals:    09/20/21 1130 09/20/21 1145 09/20/21 1200 09/20/21 1215   BP: 124/58 127/58 109/58 (!) 138/90   Pulse: 74 74 80 78   Resp:    16   TempSrc:       SpO2: 91% 92% 94% 92%   Weight:           Cardiac Rhythm:  ,      Pain level:    Patient confused: somewhat. Patient Falls Risk: Yes.   Elimination Status: Has voided   Patient Report - Initial Complaint: S  OB, Fever. Focused Assessment: Complains of generalized weakness, fever   Tests Performed:   Labs Ordered and Resulted from Time of ED Arrival Up to the Time of Departure from the ED   BASIC METABOLIC PANEL - Abnormal; Notable for the following components:       Result Value    Sodium 132 (*)     Glucose 115 (*)     All other components within normal limits   COVID-19 VIRUS (CORONAVIRUS) BY PCR - Abnormal; Notable for the following components:    SARS CoV2 PCR Positive (*)     All other components within normal limits    Narrative:     Testing was performed using the rober  SARS-CoV-2 & Influenza A/B Assay on the rober  [employer]dal[employer]  System.  This test should be ordered for the detection of SARS-COV-2 in individuals who meet SARS-CoV-2 clinical and/or epidemiological criteria. Test performance is unknown in asymptomatic patients.  This test is  "for in vitro diagnostic use under the FDA EUA for laboratories certified under CLIA to perform moderate and/or high complexity testing. This test has not been FDA cleared or approved.  A negative test does not rule out the presence of PCR inhibitors in the specimen or target RNA in concentration below the limit of detection for the assay. The possibility of a false negative should be considered if the patient's recent exposure or clinical presentation suggests COVID-19.  [facility] are certified under the Clinical Laboratory Improvement Amendments of 1988 (CLIA-88) as qualified to perform moderate and/or high complexity laboratory testing.   BLOOD GAS VENOUS - Abnormal; Notable for the following components:    pH Venous 7.45 (*)     Bicarbonate Venous 31 (*)     Base Excess/Deficit (+/-) 5.9 (*)     All other components within normal limits   CBC WITH PLATELETS AND DIFFERENTIAL - Abnormal; Notable for the following components:    WBC Count 90.9 (*)     RBC Count 3.65 (*)     Hemoglobin 10.8 (*)     Hematocrit 34.0 (*)     RDW 16.1 (*)     Platelet Count 149 (*)     All other components within normal limits    Narrative:     Previously reported component [ NRBCs ] is no longer reported.\"  Previously reported component [ NRBCs Absolute ] is no longer reported.\"   BASIC METABOLIC PANEL - Abnormal; Notable for the following components:    Sodium 130 (*)     Glucose 116 (*)     All other components within normal limits   DIFFERENTIAL - Abnormal; Notable for the following components:    Absolute Lymphocytes 87.3 (*)     Smudge Cells Present (*)     All other components within normal limits   TROPONIN I - Normal   TROPONIN I - Normal   NT PROBNP INPATIENT - Normal   PROCALCITONIN   CBC WITH PLATELETS AND DIFFERENTIAL   PULSE OXIMETRY NURSING   PERIPHERAL IV CATHETER   BLOOD CULTURE   BLOOD CULTURE   CBC WITH PLATELETS & DIFFERENTIAL    Narrative:     The following orders were created for panel order CBC " with platelets differential.  Procedure                               Abnormality         Status                     ---------                               -----------         ------                     CBC with platelets and d...[[id number]]  Abnormal            Final result               Manual Differential[[id number]]          Abnormal            Final result                 Please view results for these tests on the individual orders.   CBC WITH PLATELETS & DIFFERENTIAL    Narrative:     The following orders were created for panel order CBC with platelets differential.  Procedure                               Abnormality         Status                     ---------                               -----------         ------                     CBC with platelets and d...[[id number]]                                                   Please view results for these tests on the individual orders.     XR Chest Port 1 View   Final Result   IMPRESSION: The lungs appear emphysematous. There is some airspace   opacity in the retrocardiac left lower lobe, possibly developing   pneumonia. No pneumothorax or pleural effusion.      PADMINI MOULTON MD            SYSTEM ID:  [id number]         Abnormal Results: .See above - Ca pt.   Treatments provided: IV steroids, 4L NC  Family Comments:   Sons phone number in chart  OBS brochure/video discussed/provided to patient:  No  ED Medications:   Medications   methylPREDNISolone sodium succinate (solu-MEDROL) injection 125 mg (125 mg Intravenous Given 9/20/21 1112)     Drips infusing:  No  For the majority of the shift, the patient's behavior Green. Interventions performed were none.    Sepsis treatment initiated: No     Patient tested for COVID 19 prior to admission: YES    ED Nurse Name/Phone Number: Bere Ward RN,   1:39 PM    RECEIVING UNIT ED HANDOFF REVIEW    Above ED Nurse Handoff Report was reviewed: Yes  Reviewed by: Feliz La RN on September 20, 2021 at 4:29  PM

## 2021-09-20 NOTE — ED NOTES
DATE:  9/20/2021   TIME OF RECEIPT FROM LAB:  1051  LAB TEST:  WBC  LAB VALUE:  90.9  RESULTS GIVEN WITH READ-BACK TO (PROVIDER):  Data Unavailable  TIME LAB VALUE REPORTED TO PROVIDER:   4285

## 2021-09-20 NOTE — H&P
Admitted: 09/20/2021    CHIEF COMPLAINT:  Shortness of breath, COVID positive.    HISTORY OF PRESENT ILLNESS:  Mrs. Kline is an 89-year-old female with a history of COPD, on 2 liters via nasal cannula chronically, history of chronic lymphocytic leukemia with chronic and persistent leukocytosis, who presented to the hospital today for shortness of breath and generalized weakness in the setting of a recent positive COVID test.  The patient states that she was tested for COVID approximately 1 week ago.  She was found to be positive.  She has been quarantining at home.  She presented to the hospital today for concerns about worsening shortness of breath, fevers, and weakness.  The patient had been vaccinated for COVID earlier this year, receiving both injections.  She was most recently hospitalized in 07/2021 for COPD exacerbation and pneumonia.    On arrival to the ER today, vital signs included a blood pressure of 133/69 with a heart rate of 96.  No fever.  Saturation 94%, currently on 4 liters of oxygen.  She is chronically on 2 liters at home.    Workup in the ER included labs and imaging.  Sodium is 132.  COVID-19 is positive.  Troponin undetectable.  BNP is normal.  VBG unremarkable.  White cell count is 90.9 in the setting of chronic lymphocytic leukemia.  It appears her baseline white cell count is in the 50,000-100,000 range.    Blood cultures were obtained.  Chest x-ray was obtained that shows a questionable retrocardiac opacity in left lower lobe.  I reviewed the chest x-ray personally.  I spoke with Dr. Gonzalez of the ER.  The patient was given Solu-Medrol in the ER.  Plan is for admission to the hospital for acute on chronic hypoxic respiratory failure related to COVID infection.    The patient reports that she has not had any increase in her usual chronic cough or sputum color change.    PAST MEDICAL HISTORY:     1.  Chronic lymphocytic leukemia.  Baseline white cell count in the 50,000-100,000 range on  chart review.  Receives intermittent IVIG infusions every few months.  Follows with Dr. Ivory of Oncology.  2.  COPD, on chronic home oxygen on 2 liters via nasal cannula.  3.  Recent admission to the hospital in 07/2021 for COPD exacerbation and pneumonia.  4.  Bladder cancer.  5.  Hypertension.  6.  Hypothyroidism.  7.  Congestive heart failure with preserved ejection fraction.  8.  DNR/DNI code status.    CURRENT MEDICATIONS:  Await pharmacy reconciliation medication list.    ALLERGIES:    1.  CT CONTRAST DYE.  2.  PROLIA.  3.  WOUND DRESSING ADHESIVE.    FAMILY HISTORY:  Reviewed.  Nothing contributory to this admission.    SOCIAL HISTORY:  The patient does not smoke.  No excessive alcohol use.  Lives alone.  I reviewed code status with her and she is DNR/DNI.  This is consistent with previous admissions to the hospital.    REVIEW OF SYSTEMS:  See HPI for details.  Comprehensive greater than 10-point review of systems is otherwise negative besides that detailed above.    PHYSICAL EXAMINATION:    VITAL SIGNS:  Blood pressure is currently 109/91 with a heart rate of 70.  Temperature 99.2 degrees.  Saturation 93% on 4 L of oxygen.  GENERAL:  The patient appears nontoxic and in no acute distress.  She is somewhat cachectic appearing, consistent with her underlying chronic illness of COPD.  Does not appear tachypneic and finishes sentences easily.  HEENT:  Head is atraumatic.  Sclerae white.  Eyelids normal.  Conjunctivae normal.  Extraocular movements are intact.  NECK:  Supple.  No cervical or supraclavicular lymphadenopathy.  CARDIOVASCULAR:  Heart is regular rate and rhythm.  No significant murmurs.  No lower extremity edema.  LUNGS:  Clear to auscultation bilaterally.  Distant breath sounds.  No wheezing.  No rhonchi.  No conversational dyspnea.  ABDOMEN:  Nontender, nondistended.  Soft.  No masses.  No organomegaly.  EXTREMITIES:  Show no edema.  SKIN:  Reveals no rashes.  No jaundice.  Skin is dry to  touch.  NEUROLOGIC:  Cranial nerves II through XII are intact.  Moves all extremities appropriately.  Sensation intact to light touch in the upper and lower extremities bilaterally.  PSYCHIATRIC:  The patient is awake, alert and oriented x 3.  Mood and affect are normal and appropriate.    LABORATORY AND IMAGING DATA:   Reviewed above in HPI.    Chest x-ray personally reviewed as above.    IMPRESSION AND PLAN:  Mrs. Kline is an 89-year-old female with a history of chronic obstructive pulmonary disease on chronic home oxygen, chronic lymphocytic leukemia with chronic leukocytosis, who presented to the hospital today for worsening shortness of breath, fevers, and weakness in the setting of a recent diagnosis of COVID-19 pneumonia.  1.  Acute on chronic hypoxic respiratory failure due to COVID-19 infection.  2.  The patient was COVID-19 vaccinated earlier this year.  3.  History of chronic respiratory failure due to COPD, on chronic home oxygen at 2 liters via nasal cannula.  4.  History of chronic lymphocytic leukemia with chronic leukocytosis, baseline white cell count in the 50,000-100,000 range.  Receives intermittent IVIG infusions and follows with Dr. Ivory of Oncology.  5.  Questionable retrograde infiltrate on chest x-ray without symptoms of bacterial pneumonia including cough or sputum color change.    PLAN:    1.  Admit to inpatient status.  Anticipate greater than 2 midnights in the hospital given acute on chronic hypoxic respiratory failure and worsening hypoxia.  2.  Dexamethasone 6 mg orally daily will be ordered.  Anticipate a 10-day course.  3.  Check liver function tests.  If normal or near normal, consider a course of IV remdesivir.  4.  Check procalcitonin level.  If normal or near normal, would not give antibiotics given lack of symptoms supporting bacterial pneumonia.  However, if significantly elevated, would consider starting antibiotics for secondary bacterial pneumonia, although seems unlikely  at this point.  5.  Lovenox for prophylaxis.  6.  Pantoprazole for prophylaxis.  7.  The patient is DNR/DNI, as I discussed with her today.    Addendum:  LFTs elevated with AST/ALT in low 100s.  Given elevated LFTs will hold off on starting Remdesivir for now and follow her clinical response to dexamethasone.      Ildefonso Goddard MD        D: 2021   T: 2021   MT: DEBBIE    Name:     MAGGI RODRIGUEZ  MRN:      [id number]        Account:     [id number]   :      1932           Admitted:    2021       Document: [id number]

## 2021-09-20 NOTE — LETTER
Yarely PINZON [facility] [phone]. Covid + , vaccinated.  Will be here for several days.

## 2021-09-20 NOTE — LETTER
[facility]  Inpatient Care Coordination   [phone]  [employer]       Pt discharge home 9/25  Please call to update family on when Rn can see pt at home. They are aware of delay of PT/OT

## 2021-09-21 ENCOUNTER — APPOINTMENT (OUTPATIENT)
Dept: OCCUPATIONAL THERAPY | Facility: CLINIC | Age: 86
DRG: 177 | End: 2021-09-21
Attending: INTERNAL MEDICINE
Payer: MEDICARE

## 2021-09-21 ENCOUNTER — APPOINTMENT (OUTPATIENT)
Dept: PHYSICAL THERAPY | Facility: CLINIC | Age: 86
DRG: 177 | End: 2021-09-21
Attending: INTERNAL MEDICINE
Payer: MEDICARE

## 2021-09-21 ENCOUNTER — PATIENT OUTREACH (OUTPATIENT)
Dept: CARE COORDINATION | Facility: CLINIC | Age: 86
End: 2021-09-21

## 2021-09-21 LAB
ALBUMIN SERPL-MCNC: 2.7 G/DL (ref 3.4–5)
ALP SERPL-CCNC: 113 U/L (ref 40–150)
ALT SERPL W P-5'-P-CCNC: 167 U/L (ref 0–50)
ANION GAP SERPL CALCULATED.3IONS-SCNC: 7 MMOL/L (ref 3–14)
AST SERPL W P-5'-P-CCNC: 226 U/L (ref 0–45)
BILIRUB DIRECT SERPL-MCNC: 0.2 MG/DL (ref 0–0.2)
BILIRUB SERPL-MCNC: 0.4 MG/DL (ref 0.2–1.3)
BUN SERPL-MCNC: 24 MG/DL (ref 7–30)
CALCIUM SERPL-MCNC: 8.9 MG/DL (ref 8.5–10.1)
CHLORIDE BLD-SCNC: 95 MMOL/L (ref 94–109)
CO2 SERPL-SCNC: 29 MMOL/L (ref 20–32)
CREAT SERPL-MCNC: 0.76 MG/DL (ref 0.52–1.04)
ERYTHROCYTE [DISTWIDTH] IN BLOOD BY AUTOMATED COUNT: 15.9 % (ref 10–15)
GFR SERPL CREATININE-BSD FRML MDRD: 70 ML/MIN/1.73M2
GLUCOSE BLD-MCNC: 136 MG/DL (ref 70–99)
HCT VFR BLD AUTO: 35.5 % (ref 35–47)
HGB BLD-MCNC: 10.7 G/DL (ref 11.7–15.7)
MCH RBC QN AUTO: 28.8 PG (ref 26.5–33)
MCHC RBC AUTO-ENTMCNC: 30.1 G/DL (ref 31.5–36.5)
MCV RBC AUTO: 95 FL (ref 78–100)
PLATELET # BLD AUTO: 189 10E3/UL (ref 150–450)
POTASSIUM BLD-SCNC: 5.7 MMOL/L (ref 3.4–5.3)
PROT SERPL-MCNC: 7 G/DL (ref 6.8–8.8)
RBC # BLD AUTO: 3.72 10E6/UL (ref 3.8–5.2)
SODIUM SERPL-SCNC: 131 MMOL/L (ref 133–144)
WBC # BLD AUTO: 142.9 10E3/UL (ref 4–11)

## 2021-09-21 PROCEDURE — 99232 SBSQ HOSP IP/OBS MODERATE 35: CPT | Performed by: INTERNAL MEDICINE

## 2021-09-21 PROCEDURE — 82248 BILIRUBIN DIRECT: CPT | Performed by: INTERNAL MEDICINE

## 2021-09-21 PROCEDURE — 250N000011 HC RX IP 250 OP 636: Performed by: INTERNAL MEDICINE

## 2021-09-21 PROCEDURE — 97165 OT EVAL LOW COMPLEX 30 MIN: CPT | Mod: GO

## 2021-09-21 PROCEDURE — 36415 COLL VENOUS BLD VENIPUNCTURE: CPT | Performed by: INTERNAL MEDICINE

## 2021-09-21 PROCEDURE — 250N000009 HC RX 250: Performed by: INTERNAL MEDICINE

## 2021-09-21 PROCEDURE — 999N000157 HC STATISTIC RCP TIME EA 10 MIN

## 2021-09-21 PROCEDURE — 94640 AIRWAY INHALATION TREATMENT: CPT

## 2021-09-21 PROCEDURE — 120N000001 HC R&B MED SURG/OB

## 2021-09-21 PROCEDURE — 97162 PT EVAL MOD COMPLEX 30 MIN: CPT | Mod: GP

## 2021-09-21 PROCEDURE — 97530 THERAPEUTIC ACTIVITIES: CPT | Mod: GP

## 2021-09-21 PROCEDURE — 85027 COMPLETE CBC AUTOMATED: CPT | Performed by: INTERNAL MEDICINE

## 2021-09-21 PROCEDURE — 250N000013 HC RX MED GY IP 250 OP 250 PS 637: Performed by: INTERNAL MEDICINE

## 2021-09-21 PROCEDURE — 94640 AIRWAY INHALATION TREATMENT: CPT | Mod: 76

## 2021-09-21 PROCEDURE — 82310 ASSAY OF CALCIUM: CPT | Performed by: INTERNAL MEDICINE

## 2021-09-21 PROCEDURE — 250N000012 HC RX MED GY IP 250 OP 636 PS 637: Performed by: INTERNAL MEDICINE

## 2021-09-21 PROCEDURE — 99207 PR CDG-MDM COMPONENT: MEETS MODERATE - UP CODED: CPT | Performed by: INTERNAL MEDICINE

## 2021-09-21 PROCEDURE — 97535 SELF CARE MNGMENT TRAINING: CPT | Mod: GO

## 2021-09-21 RX ORDER — IPRATROPIUM BROMIDE AND ALBUTEROL SULFATE 2.5; .5 MG/3ML; MG/3ML
1 SOLUTION RESPIRATORY (INHALATION) 3 TIMES DAILY
Status: DISCONTINUED | OUTPATIENT
Start: 2021-09-21 | End: 2021-09-24

## 2021-09-21 RX ORDER — BUDESONIDE 0.5 MG/2ML
0.5 INHALANT ORAL 2 TIMES DAILY
Status: DISCONTINUED | OUTPATIENT
Start: 2021-09-21 | End: 2021-09-21

## 2021-09-21 RX ORDER — IPRATROPIUM BROMIDE AND ALBUTEROL SULFATE 2.5; .5 MG/3ML; MG/3ML
1 SOLUTION RESPIRATORY (INHALATION) 3 TIMES DAILY
Status: DISCONTINUED | OUTPATIENT
Start: 2021-09-21 | End: 2021-09-21 | Stop reason: DRUGHIGH

## 2021-09-21 RX ORDER — BUDESONIDE 0.5 MG/2ML
0.5 INHALANT ORAL 2 TIMES DAILY
Status: DISCONTINUED | OUTPATIENT
Start: 2021-09-21 | End: 2021-09-25 | Stop reason: HOSPADM

## 2021-09-21 RX ORDER — IPRATROPIUM BROMIDE AND ALBUTEROL SULFATE 2.5; .5 MG/3ML; MG/3ML
3 SOLUTION RESPIRATORY (INHALATION)
Status: DISCONTINUED | OUTPATIENT
Start: 2021-09-21 | End: 2021-09-21 | Stop reason: DRUGHIGH

## 2021-09-21 RX ORDER — LORAZEPAM 0.5 MG/1
0.25 TABLET ORAL EVERY 8 HOURS PRN
Status: DISCONTINUED | OUTPATIENT
Start: 2021-09-21 | End: 2021-09-25 | Stop reason: HOSPADM

## 2021-09-21 RX ADMIN — IPRATROPIUM BROMIDE AND ALBUTEROL SULFATE 3 ML: .5; 3 SOLUTION RESPIRATORY (INHALATION) at 17:09

## 2021-09-21 RX ADMIN — ALBUTEROL SULFATE 2 PUFF: 90 AEROSOL, METERED RESPIRATORY (INHALATION) at 09:01

## 2021-09-21 RX ADMIN — Medication 1 MG: at 22:36

## 2021-09-21 RX ADMIN — ENOXAPARIN SODIUM 40 MG: 40 INJECTION SUBCUTANEOUS at 19:09

## 2021-09-21 RX ADMIN — LEVOTHYROXINE SODIUM 75 MCG: 0.07 TABLET ORAL at 08:12

## 2021-09-21 RX ADMIN — ALBUTEROL SULFATE 2 PUFF: 90 AEROSOL, METERED RESPIRATORY (INHALATION) at 14:15

## 2021-09-21 RX ADMIN — SERTRALINE HYDROCHLORIDE 50 MG: 50 TABLET ORAL at 22:30

## 2021-09-21 RX ADMIN — Medication 0.25 MG: at 14:15

## 2021-09-21 RX ADMIN — PANTOPRAZOLE SODIUM 40 MG: 40 TABLET, DELAYED RELEASE ORAL at 08:12

## 2021-09-21 RX ADMIN — ASPIRIN 81 MG: 81 TABLET ORAL at 08:13

## 2021-09-21 RX ADMIN — TRAZODONE HYDROCHLORIDE 50 MG: 50 TABLET ORAL at 22:30

## 2021-09-21 RX ADMIN — IPRATROPIUM BROMIDE AND ALBUTEROL SULFATE 3 ML: .5; 3 SOLUTION RESPIRATORY (INHALATION) at 20:29

## 2021-09-21 RX ADMIN — ATORVASTATIN CALCIUM 20 MG: 20 TABLET, FILM COATED ORAL at 22:30

## 2021-09-21 RX ADMIN — BUDESONIDE 0.5 MG: 0.5 INHALANT RESPIRATORY (INHALATION) at 20:29

## 2021-09-21 RX ADMIN — METOPROLOL SUCCINATE 25 MG: 25 TABLET, EXTENDED RELEASE ORAL at 08:12

## 2021-09-21 RX ADMIN — DEXAMETHASONE 6 MG: 2 TABLET ORAL at 08:12

## 2021-09-21 RX ADMIN — FUROSEMIDE 20 MG: 20 TABLET ORAL at 08:12

## 2021-09-21 ASSESSMENT — ACTIVITIES OF DAILY LIVING (ADL)
TOILETING_ISSUES: NO
ADLS_ACUITY_SCORE: 9
ADLS_ACUITY_SCORE: 11

## 2021-09-21 NOTE — PROGRESS NOTES
09/21/21 1131   Quick Adds   Type of Visit Initial PT Evaluation   Living Environment   People in home alone   Living Environment Comments Pt lives in IND living side of facility. Reports she makes her own meals. Has assist with cleaning   Self-Care   Usual Activity Tolerance good   Current Activity Tolerance poor   Equipment Currently Used at Home walker, rolling   Activity/Exercise/Self-Care Comment Pt reports she still cooks, is on 2 L supp O2 at baseline.    Disability/Function   Hearing Difficulty or Deaf yes   Fall history within last six months no   Change in Functional Status Since Onset of Current Illness/Injury yes   General Information   Onset of Illness/Injury or Date of Surgery 09/20/21   Referring Physician Ildefonso Goddard MD   Patient/Family Therapy Goals Statement (PT) To get stronger   Pertinent History of Current Problem (include personal factors and/or comorbidities that impact the POC) Mrs. Kline is an 89-year-old female with a history of COPD, on 2 liters via nasal cannula chronically, history of chronic lymphocytic leukemia with chronic and persistent leukocytosis, who presented to the hospital today for shortness of breath and generalized weakness in the setting of a recent positive COVID test.  The patient states that she was tested for COVID approximately 1 week ago.  She was found to be positive.  She has been quarantining at home.  She presented to the hospital today for concerns about worsening shortness of breath, fevers, and weakness.  The patient had been vaccinated for COVID earlier this year, receiving both injections.  She was most recently hospitalized in 07/2021 for COPD exacerbation and pneumonia.   Existing Precautions/Restrictions fall;oxygen therapy device and L/min   General Observations Pt has 4WW in rm    Cognition   Orientation Status (Cognition) oriented x 3   Pain Assessment   Patient Currently in Pain No   Posture    Posture Forward head position   Range of  Motion (ROM)   ROM Comment WFL   Strength   Strength Comments Pt demonstrates gross functional weakness with OOB mobility    Bed Mobility   Comment (Bed Mobility) SBA   Transfers   Transfer Safety Comments STS min A with 4WW from low toielt    Gait/Stairs (Locomotion)   Distance in Feet (Required for LE Total Joints) 5' evaluation, 4WW, unsteady, shakiness, BLE weakness with gait    Balance   Balance Comments impaired dynamic balance with BUE support    Sensory Examination   Sensory Perception Comments intact to light touch    Clinical Impression   Criteria for Skilled Therapeutic Intervention yes, treatment indicated   PT Diagnosis (PT) impaired IND with mobility form baseline   Influenced by the following impairments weakness, O2 needs, activity tolerance, balance   Functional limitations due to impairments see above   Clinical Presentation Evolving/Changing   Clinical Presentation Rationale clinical judgement, co morbidities,   Clinical Decision Making (Complexity) moderate complexity   Therapy Frequency (PT) Daily   Predicted Duration of Therapy Intervention (days/wks) 1 week   Planned Therapy Interventions (PT) balance training;bed mobility training;gait training;strengthening;transfer training;patient/family education   Risk & Benefits of therapy have been explained evaluation/treatment results reviewed;care plan/treatment goals reviewed;risks/benefits reviewed;patient   PT Discharge Planning    PT Discharge Recommendation (DC Rec) Transitional Care Facility   PT Rationale for DC Rec Eval complete, treatment indicated. Pt with limited activity tolerance. Fatigues quickly with in rm ambulation. Pt tolerated ~ 20' of ambulation with use of 4WW, desats to mid 80's on 3L, resats > 90% in ~ 1 min with PLB. Recommend TCU at [facility] to improve strength, activity tolerance, functional mobility as pt currently below baseline. Lives alone in IND living    Total Evaluation Time   Total Evaluation Time (Minutes) 15

## 2021-09-21 NOTE — PLAN OF CARE
A&Ox4. Up Ax1 with gait belt and walker. Denies pain. C/o of SOB, sats in the mis to high 80s on 2L, increased to 4L NC, sats in the low to mid 90s. Ativan given x1 for anxiety. Using inhaler. Poor appetite. Continuing decadron and lovenox. No fever this shift. Plan to discharge to TCU date pending.

## 2021-09-21 NOTE — PLAN OF CARE
Assessments: VSS. Weaned from 4L to 2.5L NC. A/O. Up Ax1 with walker and kenzie. LS: expiratory wheezes and RUL/RLL fine crackles. Infrequent dry cough.    Major Shift Events: uneventful    Treatment Plan: Dexamethasone, Lovenox, supplemental O2

## 2021-09-21 NOTE — PROGRESS NOTES
[facility]  Hospitalist Progress Note  Ildefonso Goddard MD 09/21/2021    Reason for Stay (Diagnosis): COVID, COPD         Assessment and Plan:      Summary of Stay: Marie Kline is a 89-year-old female with a history of chronic obstructive pulmonary disease on chronic home oxygen, chronic lymphocytic leukemia with chronic leukocytosis, who presented to the hospital today for worsening shortness of breath, fevers, and weakness in the setting of a recent diagnosis of COVID-19 pneumonia.    The patient continues to feels dyspneic.  She lives alone and feels too weak to return home right now.      1.  Acute on chronic hypoxic respiratory failure due to COVID-19 infection.  - continue Dexamethasone x 10 days  - Remdesivir not started given elevated baseline LFTs and minimal increase in oxygen requirements compared to baseline  - Lovenox for DVT ppx  - PPI for GI ppx  - low dose Ativan prn for anxiety    2.  COVID-19 vaccinated    3.  Acute COPD exacerbation with a history of chronic respiratory failure due to COPD, on chronic home oxygen at 2 liters via nasal cannula.    4.  History of chronic lymphocytic leukemia with chronic leukocytosis, baseline white cell count in the 50,000-100,000 range.  Receives intermittent IVIG infusions and follows with Dr. Ivory of Oncology.  - increase in WBC count since admit due to steroids    5.  Questionable retrograde infiltrate on chest x-ray without symptoms of bacterial pneumonia including cough or sputum color change.  procalcitonin level low.  Antibiotics not administered as low suspicion for bacterial PNA.      6.  Deconditioning/weakness  - appreciate rehab eval.  TCU recommended    CODE:  DNR/DNI  PPX:  Lovenox, PPI  DISPO:  TCU recommended by rehab therapy.  Anticipate discharge when bed is found           Interval History (Subjective):      Feels too weak to go back to where she is living currently.  Still feels SOB.  On 4 liters O2 (normally on 2  "liters).                  Physical Exam:      Last Vital Signs:  /71 (BP Location: Left arm)   Pulse 61   Temp 97.5  F (36.4  C) (Temporal)   Resp 22   Ht 1.422 m (4' 8\")   Wt 38.5 kg (84 lb 14.4 oz)   LMP  (LMP Unknown)   SpO2 91%   BMI 19.03 kg/m      No intake or output data in the 24 hours ending 09/21/21 1433    Constitutional: Awake, alert, cooperative, no apparent distress   Respiratory: Clear to auscultation bilaterally, no crackles or wheezing   Cardiovascular: Regular rate and rhythm, normal S1 and S2, and no murmur noted   Abdomen: Normal bowel sounds, soft, non-distended, non-tender   Skin: No rashes, no cyanosis, dry to touch   Neuro: Alert and oriented x3, no weakness, numbness, memory loss   Extremities: No edema, normal range of motion   Other(s): Appears anxious       All other systems: Negative          Medications:      All current medications were reviewed with changes reflected in problem list.         Data:      All new lab and imaging data was reviewed.   Labs:  Recent Labs   Lab 09/21/21  0853   .9*   HGB 10.7*   HCT 35.5   MCV 95         Imaging:   No results found for this or any previous visit (from the past 24 hour(s)).   "

## 2021-09-21 NOTE — PHARMACY-ADMISSION MEDICATION HISTORY
Admission medication history interview status for this patient is complete. See [facility] admission navigator for allergy information, prior to admission medications and immunization status.     Medication history interview done, indicate source(s): Patient  Medication history resources (including written lists, pill bottles, clinic record):None    Changes made to PTA medication list:  Added: budesonide neb, duoneb   Changed: flonase from scheduled to prn, sertraline from qpm to daily, aspirin from daily to qpm  Reported as Not Taking: albuterol neb  Removed: clarinex, dulera    Actions taken by pharmacist (provider contacted, etc):None     Additional medication history information:None    Medication reconciliation/reorder completed by provider prior to medication history?  Y   (Y/N)     Prior to Admission medications    Medication Sig Last Dose Taking? Auth Provider   albuterol (PROVENTIL HFA) 108 (90 Base) MCG/ACT inhaler Inhale 2 puffs into the lungs every 6 hours as needed for shortness of breath / dyspnea or wheezing  at prn Yes    aspirin 81 MG EC tablet Take 81 mg by mouth every evening  9/19/2021 at pm Yes Unknown, Entered By History   atorvastatin (LIPITOR) 20 MG tablet Take 1 tablet (20 mg) by mouth daily  Patient taking differently: Take 20 mg by mouth every evening  9/19/2021 at pm Yes Piper Kiser MD   budesonide (PULMICORT) 0.5 MG/2ML neb solution Take 0.5 mg by nebulization 2 times daily 9/20/2021 at am Yes Unknown, Entered By History   ferrous sulfate (IRON) 325 (65 FE) MG tablet Take 325 mg by mouth daily (with breakfast)  9/20/2021 at am Yes Reported, Patient   fluticasone (FLONASE) 50 MCG/ACT nasal spray Spray 1 spray into both nostrils daily  Patient taking differently: Spray 1 spray into both nostrils daily as needed   at prn Yes Piper Kiser MD   furosemide (LASIX) 20 MG tablet Take 1 tablet (20 mg) by mouth daily 9/20/2021 at am Yes Piper Kiser MD   Immune Globulin, Human,  (OCTAGAM) 5 GM/100ML SOLN 300 mg/kg into the vein every 30 days    Hold this medication while in TCU-resume at discharge from TCU  Yes Fide Wilkes MD   ipratropium - albuterol 0.5 mg/2.5 mg/3 mL (DUONEB) 0.5-2.5 (3) MG/3ML neb solution Take 1 vial by nebulization 3 times daily 9/20/2021 at Unknown time Yes Unknown, Entered By History   levothyroxine (SYNTHROID/LEVOTHROID) 75 MCG tablet Take 1 tablet (75 mcg) by mouth daily 9/20/2021 at am Yes Piper Kiser MD   loperamide (IMODIUM) 2 MG capsule Take 2 mg by mouth daily as needed for diarrhea  at prn Yes Unknown, Entered By History   metoprolol succinate ER (TOPROL-XL) 25 MG 24 hr tablet Take 1 tablet (25 mg) by mouth daily 9/20/2021 at am Yes Piper Kiser MD   Multiple Vitamins-Minerals (MULTIVITAMIN ADULT) CHEW Take 2 chew tab by mouth daily 9/20/2021 at am Yes Reported, Patient   sertraline (ZOLOFT) 50 MG tablet Take 1 tablet (50 mg) by mouth daily 9/20/2021 at am Yes Piper Kiser MD   traZODone (DESYREL) 50 MG tablet Take 50 mg by mouth At Bedtime 9/19/2021 at pm Yes Unknown, Entered By History   ACE/ARB/ARNI NOT PRESCRIBED (INTENTIONAL) Please choose reason not prescribed from choices below.   Nehal Freeman CNP   albuterol (PROVENTIL) (2.5 MG/3ML) 0.083% neb solution Take 1 vial (2.5 mg) by nebulization every 6 hours as needed for shortness of breath / dyspnea or wheezing  Patient not taking: Reported on 9/20/2021 Not Taking at Unknown time  Piper Kiser MD

## 2021-09-21 NOTE — PROGRESS NOTES
Clinic Care Coordination Contact  Ambulatory Care Coordination to Inpatient Care Management   Hand-In Communication    Date:  September 21, 2021  Name: Marie Kline is enrolled in Ambulatory Care Coordination program and I am the Lead Care Coordinator.  CC Contact Information: Epic In Basket + phone  Payor Source: Payor: MEDICARE / Plan: MEDICARE / Product Type: Medicare /   Current services in place:     Please see the CC Snapshot and Care Management Flowsheets for specific  details of this Marie Kline care plan.   Additional details/specific concerns r/t this admission:    Goals of Care Wound care.    I will follow this admission in Epic. Please feel free to contact me with questions or for further collaboration in discharge planning.      YESSI Vela  Clinic Care Coordinator  Ph. [phone]  [email or web address]

## 2021-09-21 NOTE — CONSULTS
Care Management Initial Consult    General Information  Assessment completed with: Patient, Children, anna and pt  Type of CM/SW Visit: Initial Assessment    Primary Care Provider verified and updated as needed: Yes   Readmission within the last 30 days:        Reason for Consult: care coordination/care conference, discharge planning      Communication Assessment  Patient's communication style: spoken language (English or Bilingual)    Hearing Difficulty or Deaf: yes   Wear Glasses or Blind: yes    Cognitive  Cognitive/Neuro/Behavioral: WDL  Level of Consciousness: alert  Arousal Level: opens eyes spontaneously  Orientation: oriented x 4  Mood/Behavior: calm, cooperative  Best Language: 0 - No aphasia  Speech: clear, spontaneous, logical    Living Environment:   People in home: alone     Current living Arrangements: independent living facility      Able to return to prior arrangements: yes       Family/Social Support:  Care provided by: self  Provides care for: no one, unable/limited ability to care for self     Children          Description of Support System:       supportive     Current Resources:   Patient receiving home care services: No     Community Resources: None  Equipment currently used at home: walker, rolling  Supplies currently used at home: None    Employment/Financial:  Employment Status:          Financial Concerns: No concerns identified           Lifestyle & Psychosocial Needs:  Social Determinants of Health     Tobacco Use: Medium Risk     Smoking Tobacco Use: Former Smoker     Smokeless Tobacco Use: Never Used   Alcohol Use:      Frequency of Alcohol Consumption:      Average Number of Drinks:      Frequency of Binge Drinking:    Financial Resource Strain: Low Risk      Difficulty of Paying Living Expenses: Not hard at all   Food Insecurity: No Food Insecurity     Worried About Running Out of Food in the Last Year: Never true     Ran Out of Food in the Last Year: Never true   Transportation  Needs: No Transportation Needs     Lack of Transportation (Medical): No     Lack of Transportation (Non-Medical): No   Physical Activity:      Days of Exercise per Week:      Minutes of Exercise per Session:    Stress: No Stress Concern Present     Feeling of Stress : Not at all   Social Connections: Unknown     Frequency of Communication with Friends and Family: More than three times a week     Frequency of Social Gatherings with Friends and Family: Not on file     Attends [employer] Services: Not on file     Active Member of Clubs or Organizations: Not on file     Attends Club or Organization Meetings: Not on file     Marital Status: Not on file   Intimate Partner Violence: Not At Risk     Fear of Current or Ex-Partner: No     Emotionally Abused: No     Physically Abused: No     Sexually Abused: No   Depression: Not at risk     PHQ-2 Score: 0   Housing Stability:      Unable to Pay for Housing in the Last Year:      Number of Places Lived in the Last Year:      Unstable Housing in the Last Year:        Functional Status:  Prior to admission patient needed assistance:    no          Mental Health Status:  Mental Health Status: No Current Concerns       Chemical Dependency Status:  Chemical Dependency Status: No Current Concerns           Additional Information:  Pt is admitted with Covid +.  Pt lives at the [facility].  She gets cleaning services every other week by the [facility].  She has home 02 through [employer] 2.5 L NC.  She does use a walker.  She has a nebulizer machine.  Pt is vaccinated.  Therapy is recommending TCU.  Pt said her Dtg is looking into adding [facility] services.  I called [facility] and she said she will call them to see if they can add services and at what time can they start.  Dtg is wondering about TCU.      Pt/family was given the Medicare Compare List for SNF, with associated star ratings to assist with choices for referrals/discharge planning Yes  Education was given to pt/family that star ratings are  updated/maintained by Medicare and can be reviewed by visiting www.medicare.gov Yes    Referral will be sent to [facility].    Dtg can provide transport at discharge.       Care Management  Note      Pt is a Service Bundle #3, Unplanned readmit risk of 26%, and follows with [facility].  Will give hand off.      Yarely Pérez RN BSN   Inpatient Care Coordination  [facility]  [phone]        Zulay Pérez RN

## 2021-09-21 NOTE — PLAN OF CARE
Assessments: VSS, A&O x4. On 4L/nc, baseline 2L, SpO2 93-95%. Transfers with A1, uses gait belt and walker, dyspnea on exertion. Complained of headache, Tylenol given with relief.     Major Shift Events: ED admit, arrived to [facility] around 1700H    Treatment Plan: Dexamethasone, Lovenox, supplemental O2    Bedside Nurse: Feliz La RN

## 2021-09-22 ENCOUNTER — APPOINTMENT (OUTPATIENT)
Dept: PHYSICAL THERAPY | Facility: CLINIC | Age: 86
DRG: 177 | End: 2021-09-22
Payer: MEDICARE

## 2021-09-22 LAB
ANION GAP SERPL CALCULATED.3IONS-SCNC: 4 MMOL/L (ref 3–14)
BUN SERPL-MCNC: 22 MG/DL (ref 7–30)
CALCIUM SERPL-MCNC: 8.4 MG/DL (ref 8.5–10.1)
CHLORIDE BLD-SCNC: 96 MMOL/L (ref 94–109)
CO2 SERPL-SCNC: 32 MMOL/L (ref 20–32)
CREAT SERPL-MCNC: 0.7 MG/DL (ref 0.52–1.04)
GFR SERPL CREATININE-BSD FRML MDRD: 77 ML/MIN/1.73M2
GLUCOSE BLD-MCNC: 184 MG/DL (ref 70–99)
POTASSIUM BLD-SCNC: 4.1 MMOL/L (ref 3.4–5.3)
SODIUM SERPL-SCNC: 132 MMOL/L (ref 133–144)

## 2021-09-22 PROCEDURE — 94640 AIRWAY INHALATION TREATMENT: CPT | Mod: 76

## 2021-09-22 PROCEDURE — 250N000011 HC RX IP 250 OP 636: Performed by: INTERNAL MEDICINE

## 2021-09-22 PROCEDURE — 80048 BASIC METABOLIC PNL TOTAL CA: CPT | Performed by: INTERNAL MEDICINE

## 2021-09-22 PROCEDURE — 120N000001 HC R&B MED SURG/OB

## 2021-09-22 PROCEDURE — 250N000009 HC RX 250: Performed by: INTERNAL MEDICINE

## 2021-09-22 PROCEDURE — 97530 THERAPEUTIC ACTIVITIES: CPT | Mod: GP | Performed by: PHYSICAL THERAPIST

## 2021-09-22 PROCEDURE — 999N000157 HC STATISTIC RCP TIME EA 10 MIN

## 2021-09-22 PROCEDURE — 99233 SBSQ HOSP IP/OBS HIGH 50: CPT | Performed by: INTERNAL MEDICINE

## 2021-09-22 PROCEDURE — 94640 AIRWAY INHALATION TREATMENT: CPT

## 2021-09-22 PROCEDURE — 36415 COLL VENOUS BLD VENIPUNCTURE: CPT | Performed by: INTERNAL MEDICINE

## 2021-09-22 PROCEDURE — 250N000012 HC RX MED GY IP 250 OP 636 PS 637: Performed by: INTERNAL MEDICINE

## 2021-09-22 PROCEDURE — 250N000013 HC RX MED GY IP 250 OP 250 PS 637: Performed by: INTERNAL MEDICINE

## 2021-09-22 PROCEDURE — 97116 GAIT TRAINING THERAPY: CPT | Mod: GP | Performed by: PHYSICAL THERAPIST

## 2021-09-22 RX ADMIN — BUDESONIDE 0.5 MG: 0.5 INHALANT RESPIRATORY (INHALATION) at 20:56

## 2021-09-22 RX ADMIN — TRAZODONE HYDROCHLORIDE 50 MG: 50 TABLET ORAL at 22:54

## 2021-09-22 RX ADMIN — FUROSEMIDE 20 MG: 20 TABLET ORAL at 10:10

## 2021-09-22 RX ADMIN — ENOXAPARIN SODIUM 30 MG: 30 INJECTION SUBCUTANEOUS at 19:04

## 2021-09-22 RX ADMIN — Medication 0.25 MG: at 10:10

## 2021-09-22 RX ADMIN — PANTOPRAZOLE SODIUM 40 MG: 40 TABLET, DELAYED RELEASE ORAL at 10:10

## 2021-09-22 RX ADMIN — BUDESONIDE 0.5 MG: 0.5 INHALANT RESPIRATORY (INHALATION) at 08:12

## 2021-09-22 RX ADMIN — ASPIRIN 81 MG: 81 TABLET ORAL at 22:54

## 2021-09-22 RX ADMIN — DEXAMETHASONE 6 MG: 2 TABLET ORAL at 10:10

## 2021-09-22 RX ADMIN — ATORVASTATIN CALCIUM 20 MG: 20 TABLET, FILM COATED ORAL at 22:54

## 2021-09-22 RX ADMIN — IPRATROPIUM BROMIDE AND ALBUTEROL SULFATE 3 ML: .5; 3 SOLUTION RESPIRATORY (INHALATION) at 17:11

## 2021-09-22 RX ADMIN — IPRATROPIUM BROMIDE AND ALBUTEROL SULFATE 3 ML: .5; 3 SOLUTION RESPIRATORY (INHALATION) at 20:56

## 2021-09-22 RX ADMIN — METOPROLOL SUCCINATE 25 MG: 25 TABLET, EXTENDED RELEASE ORAL at 10:10

## 2021-09-22 RX ADMIN — LEVOTHYROXINE SODIUM 75 MCG: 0.07 TABLET ORAL at 22:54

## 2021-09-22 RX ADMIN — ALBUTEROL SULFATE 2 PUFF: 90 AEROSOL, METERED RESPIRATORY (INHALATION) at 10:10

## 2021-09-22 RX ADMIN — IPRATROPIUM BROMIDE AND ALBUTEROL SULFATE 3 ML: .5; 3 SOLUTION RESPIRATORY (INHALATION) at 08:10

## 2021-09-22 RX ADMIN — SERTRALINE HYDROCHLORIDE 50 MG: 50 TABLET ORAL at 22:54

## 2021-09-22 ASSESSMENT — ACTIVITIES OF DAILY LIVING (ADL)
ADLS_ACUITY_SCORE: 9
ADLS_ACUITY_SCORE: 9
ADLS_ACUITY_SCORE: 11
ADLS_ACUITY_SCORE: 9

## 2021-09-22 NOTE — PROGRESS NOTES
[facility]  Hospitalist Progress Note  Lokesh Slaughter MD 09/22/2021    Reason for Stay (Diagnosis): COVID, COPD         Assessment and Plan:      Summary of Stay: Marie Kline is a 89-year-old female with a history of chronic respiratory failure oxygen dependent due to COPD, chronic lymphocytic leukemia with chronic leukocytosis, who presented to the hospital today for worsening shortness of breath, fevers, and weakness in the setting of a recent diagnosis of COVID-19 pneumonia.  The patient continues to feels dyspneic.  She lives alone and feels too weak to return home right now.   1.  Acute on chronic hypoxic respiratory failure due to COVID-19 infection.  - Continue Dexamethasone x 10 days  - Remdesivir not started given elevated baseline LFTs and minimal increase in oxygen requirements compared to baseline  - Lovenox for DVT ppx  -Continue PPI for GI ppx  -Continue Ativan for anxiety as needed.  -Patient is vaccinated for COVID-19.    2.   Acute on chronic respiratory failure secondary to COPD exacerbation and COVID-19 infection.  -At baseline she is on 2 L of oxygen .  -Continue bronchodilators.  -Continue steroid .  -Titrate oxygen down as able to baseline 2 L/min .    3.  Chronic lymphocytic leukemia with chronic leukocytosis.  -Baseline WBC in the 50,000-100,000 range.    -Receives intermittent IVIG infusions and follows with Dr. Ivory of Oncology.  - increase in WBC count since admit due to steroids.  -Today's 143K.  -If it continues to increase we will ask her oncologist tomorrow.    4.  Questionable retrograde infiltrate on chest x-ray without symptoms of bacterial pneumonia including cough or sputum color change.  procalcitonin level low.  Antibiotics not administered as low suspicion for bacterial PNA.      5.  Deconditioning/weakness  - appreciate rehab eval.  TCU recommended    6. Mild hyperkalemia and hyponatremia. This was from blood work done yesterday.   -Check BMP now  ".  -Call MD if potassium is above 5.5.    CODE:  DNR/DNI  PPX:  Lovenox, PPI  DISPO:  TCU recommended by rehab therapy.  Anticipate discharge when bed is found  Since there is no TCU bed available, if there is a safe discharge plan in place for her and family on board, she will likely be discharged home with home health care in 1 to 2 days. If TCU bed is available she can be discharged anytime.         Interval History (Subjective):      Patient seen and examined, assumed care today, no new overnight issues, feels better, also liters of oxygen, still has shortness of breath, no cough, no runny nose, no fever or chills .                  Physical Exam:      Last Vital Signs:  /46 (BP Location: Left arm)   Pulse 67   Temp 98.1  F (36.7  C) (Temporal)   Resp 20   Ht 1.422 m (4' 8\")   Wt 38.5 kg (84 lb 14.4 oz)   LMP  (LMP Unknown)   SpO2 91%   BMI 19.03 kg/m        Intake/Output Summary (Last 24 hours) at 9/22/2021 1333  Last data filed at 9/22/2021 0500  Gross per 24 hour   Intake 640 ml   Output --   Net 640 ml       Constitutional: Awake, alert, cooperative, no apparent distress   Respiratory: Scattered crackles bilaterally, prolonged expiratory phases with wheezing.   Cardiovascular: Regular rate and rhythm, normal S1 and S2, and no murmur noted   Abdomen: Normal bowel sounds, soft, non-distended, non-tender   Skin: No rashes, no cyanosis, dry to touch   Neuro: Alert and oriented x3, no weakness, numbness, memory loss   Extremities: No edema, normal range of motion   Other(s):  Please comment cooperative       All other systems: Negative          Medications:      All current medications were reviewed with changes reflected in problem list.  Current Facility-Administered Medications   Medication     acetaminophen (TYLENOL) tablet 650 mg    Or     acetaminophen (TYLENOL) Suppository 650 mg     albuterol (PROAIR HFA/PROVENTIL HFA/VENTOLIN HFA) 108 (90 Base) MCG/ACT inhaler 2 puff     albuterol " (PROVENTIL) neb solution 2.5 mg     aspirin EC tablet 81 mg     atorvastatin (LIPITOR) tablet 20 mg     budesonide (PULMICORT) neb solution 0.5 mg     dexamethasone (DECADRON) tablet 6 mg     enoxaparin ANTICOAGULANT (LOVENOX) injection 40 mg     fluticasone (FLONASE) 50 MCG/ACT spray 1 spray     furosemide (LASIX) tablet 20 mg     ipratropium - albuterol 0.5 mg/2.5 mg/3 mL (DUONEB) neb solution 3 mL     levothyroxine (SYNTHROID/LEVOTHROID) tablet 75 mcg     lidocaine (LMX4) cream     lidocaine 1 % 0.1-1 mL     loperamide (IMODIUM) capsule 2 mg     LORazepam (ATIVAN) half-tab 0.25 mg     melatonin tablet 1 mg     metoprolol succinate ER (TOPROL-XL) 24 hr tablet 25 mg     ondansetron (ZOFRAN-ODT) ODT tab 4 mg    Or     ondansetron (ZOFRAN) injection 4 mg     pantoprazole (PROTONIX) EC tablet 40 mg     senna-docusate (SENOKOT-S/PERICOLACE) 8.6-50 MG per tablet 1 tablet    Or     senna-docusate (SENOKOT-S/PERICOLACE) 8.6-50 MG per tablet 2 tablet     sertraline (ZOLOFT) tablet 50 mg     sodium chloride (PF) 0.9% PF flush 3 mL     sodium chloride (PF) 0.9% PF flush 3 mL     traZODone (DESYREL) tablet 50 mg            Data:      All new lab and imaging data was reviewed.   Labs:   Recent Labs   Lab 09/21/21  0853 09/20/21  1011   .9* 90.9*   HGB 10.7* 10.8*   HCT 35.5 34.0*   MCV 95 93    149*     Recent Labs   Lab 09/21/21  0853 09/20/21  1810 09/20/21  1012 09/20/21  1011 09/20/21  1011   *  --  130*  --  132*   POTASSIUM 5.7*  --  4.0  --  4.0   CHLORIDE 95  --  95  --  95   CO2 29  --  29  --  29   ANIONGAP 7  --  6  --  8   *  --  116*  --  115*   BUN 24  --  17  --  17   CR 0.76 0.71 0.72   < > 0.79   GFRESTIMATED 70 76 74   < > 67   CARLOS 8.9  --  8.9  --  8.9    < > = values in this interval not displayed.     Recent Labs   Lab 09/21/21  0853 09/20/21  1012 09/20/21  1011   * 116* 115*     Imaging:   No results found for this or any previous visit (from the past 24 hour(s)).

## 2021-09-22 NOTE — PLAN OF CARE
End of Shift Summary  For vital signs and complete assessments, please see documentation flowsheets.     Pertinent assessments: Lung sounds are clear, but diminished. Pt. Has a nonproductive infrequent cough. VSS.   Major Shift Events: none   Treatment Plan: continue with oxygen therapy, and anticoagulation therapy

## 2021-09-22 NOTE — PROGRESS NOTES
Care Management Follow Up    Length of Stay (days): 2    Expected Discharge Date: 09/23/2021     Concerns to be Addressed: service       Patient plan of care discussed at interdisciplinary rounds: Yes    Anticipated Discharge Disposition: [facility]      Anticipated Discharge Services:  RN/PT/OT/HHA  Anticipated Discharge DME:  Walker     Patient/family educated on Medicare website which has current facility and service quality ratings: yes  Education Provided on the Discharge Plan:  Dtg/pt  Patient/Family in Agreement with the Plan: yes    Referrals Placed by CM/SW:  Home care     Additional Information:  There are no Covid + TCU beds available.  I reached out to [facility], [facility], [facility], and [facility].  Edmundo Robles will reach out to [facility] to see about adding [facility] services for her.  She also gave me permission to speak with RN over there.  [place] Nurse Manager. [phone] said they can add [facility] services. . [facility] PT/OT they use.  I called [facility] P: [phone] fax: [phone] and they will assess.  Referral sent.     ADDENDUM 1335. Edmundo left vm for [facility] to connect with assisted services, but hasn't heard back yet.  I gave her direct number to nursing.        15:21  [facility] [phone] said they can't add [facility] services till end of next week due to staffing.  We can add [facility] RN/PT/OT/HHA. Edmundo Robles updated that Therapy will continue to work with her here until she is back to baseline mobility and then she can discharge home to her ILF with home care RN/PT/OT/HHA.          Yarely Pérez RN BSN   Inpatient Care Coordination  [facility]  [phone]      Zulay Pérez, RN

## 2021-09-22 NOTE — DISCHARGE INSTRUCTIONS
Your home care referral was sent to [facility]  If you haven't heard from them within the next 24-48 hours,  Please call them at [phone]

## 2021-09-22 NOTE — PLAN OF CARE
End of Shift Summary  For vital signs and complete assessments, please see documentation flowsheets.     Pertinent assessments: VSS on 3L. Attempted to wean O2, pt was unable to tolerate stated they were unable to catch breath. A/O, up SBA with walker. LS: diminished. No SOB reported. Moderate thick white sputum.  Major Shift Events: uneventful  Treatment Plan: continue with oxygen therapy, and anticoagulation therapy

## 2021-09-22 NOTE — PLAN OF CARE
A&Ox4. Up SBA with gait belt and walker. Up to chair this morning. On baseline 2.5L oxygen, decrease in SOB reported. Ativan given x1 for anxiety. Plan to discharge to [facility] when medically stable. SW-CC/PT/OT following.

## 2021-09-23 ENCOUNTER — APPOINTMENT (OUTPATIENT)
Dept: PHYSICAL THERAPY | Facility: CLINIC | Age: 86
DRG: 177 | End: 2021-09-23
Payer: MEDICARE

## 2021-09-23 LAB
ANION GAP SERPL CALCULATED.3IONS-SCNC: 4 MMOL/L (ref 3–14)
BUN SERPL-MCNC: 16 MG/DL (ref 7–30)
CALCIUM SERPL-MCNC: 8.7 MG/DL (ref 8.5–10.1)
CHLORIDE BLD-SCNC: 97 MMOL/L (ref 94–109)
CO2 SERPL-SCNC: 33 MMOL/L (ref 20–32)
CREAT SERPL-MCNC: 0.72 MG/DL (ref 0.52–1.04)
ERYTHROCYTE [DISTWIDTH] IN BLOOD BY AUTOMATED COUNT: 15.9 % (ref 10–15)
GFR SERPL CREATININE-BSD FRML MDRD: 74 ML/MIN/1.73M2
GLUCOSE BLD-MCNC: 89 MG/DL (ref 70–99)
HCT VFR BLD AUTO: 32.9 % (ref 35–47)
HGB BLD-MCNC: 9.9 G/DL (ref 11.7–15.7)
MCH RBC QN AUTO: 28.8 PG (ref 26.5–33)
MCHC RBC AUTO-ENTMCNC: 30.1 G/DL (ref 31.5–36.5)
MCV RBC AUTO: 96 FL (ref 78–100)
PLATELET # BLD AUTO: 203 10E3/UL (ref 150–450)
POTASSIUM BLD-SCNC: 3.5 MMOL/L (ref 3.4–5.3)
RBC # BLD AUTO: 3.44 10E6/UL (ref 3.8–5.2)
SODIUM SERPL-SCNC: 134 MMOL/L (ref 133–144)
WBC # BLD AUTO: 101.7 10E3/UL (ref 4–11)

## 2021-09-23 PROCEDURE — 80048 BASIC METABOLIC PNL TOTAL CA: CPT | Performed by: INTERNAL MEDICINE

## 2021-09-23 PROCEDURE — 97116 GAIT TRAINING THERAPY: CPT | Mod: GP | Performed by: PHYSICAL THERAPIST

## 2021-09-23 PROCEDURE — 99232 SBSQ HOSP IP/OBS MODERATE 35: CPT | Performed by: INTERNAL MEDICINE

## 2021-09-23 PROCEDURE — 120N000001 HC R&B MED SURG/OB

## 2021-09-23 PROCEDURE — 94640 AIRWAY INHALATION TREATMENT: CPT

## 2021-09-23 PROCEDURE — 250N000011 HC RX IP 250 OP 636: Performed by: INTERNAL MEDICINE

## 2021-09-23 PROCEDURE — 36415 COLL VENOUS BLD VENIPUNCTURE: CPT | Performed by: INTERNAL MEDICINE

## 2021-09-23 PROCEDURE — 94640 AIRWAY INHALATION TREATMENT: CPT | Mod: 76

## 2021-09-23 PROCEDURE — 85027 COMPLETE CBC AUTOMATED: CPT | Performed by: INTERNAL MEDICINE

## 2021-09-23 PROCEDURE — 999N000157 HC STATISTIC RCP TIME EA 10 MIN

## 2021-09-23 PROCEDURE — 250N000009 HC RX 250: Performed by: INTERNAL MEDICINE

## 2021-09-23 PROCEDURE — 250N000012 HC RX MED GY IP 250 OP 636 PS 637: Performed by: INTERNAL MEDICINE

## 2021-09-23 PROCEDURE — 97530 THERAPEUTIC ACTIVITIES: CPT | Mod: GP | Performed by: PHYSICAL THERAPIST

## 2021-09-23 PROCEDURE — 250N000013 HC RX MED GY IP 250 OP 250 PS 637: Performed by: INTERNAL MEDICINE

## 2021-09-23 RX ADMIN — ATORVASTATIN CALCIUM 20 MG: 20 TABLET, FILM COATED ORAL at 22:07

## 2021-09-23 RX ADMIN — IPRATROPIUM BROMIDE AND ALBUTEROL SULFATE 3 ML: .5; 3 SOLUTION RESPIRATORY (INHALATION) at 19:18

## 2021-09-23 RX ADMIN — SERTRALINE HYDROCHLORIDE 50 MG: 50 TABLET ORAL at 22:07

## 2021-09-23 RX ADMIN — BUDESONIDE 0.5 MG: 0.5 INHALANT RESPIRATORY (INHALATION) at 07:59

## 2021-09-23 RX ADMIN — BUDESONIDE 0.5 MG: 0.5 INHALANT RESPIRATORY (INHALATION) at 19:18

## 2021-09-23 RX ADMIN — LEVOTHYROXINE SODIUM 75 MCG: 0.07 TABLET ORAL at 22:07

## 2021-09-23 RX ADMIN — ASPIRIN 81 MG: 81 TABLET ORAL at 22:08

## 2021-09-23 RX ADMIN — PANTOPRAZOLE SODIUM 40 MG: 40 TABLET, DELAYED RELEASE ORAL at 09:49

## 2021-09-23 RX ADMIN — FUROSEMIDE 20 MG: 20 TABLET ORAL at 09:49

## 2021-09-23 RX ADMIN — METOPROLOL SUCCINATE 25 MG: 25 TABLET, EXTENDED RELEASE ORAL at 09:49

## 2021-09-23 RX ADMIN — ENOXAPARIN SODIUM 30 MG: 30 INJECTION SUBCUTANEOUS at 18:33

## 2021-09-23 RX ADMIN — TRAZODONE HYDROCHLORIDE 50 MG: 50 TABLET ORAL at 22:07

## 2021-09-23 RX ADMIN — DEXAMETHASONE 6 MG: 2 TABLET ORAL at 09:49

## 2021-09-23 RX ADMIN — IPRATROPIUM BROMIDE AND ALBUTEROL SULFATE 3 ML: .5; 3 SOLUTION RESPIRATORY (INHALATION) at 08:00

## 2021-09-23 RX ADMIN — ACETAMINOPHEN 650 MG: 325 TABLET, FILM COATED ORAL at 13:25

## 2021-09-23 ASSESSMENT — ACTIVITIES OF DAILY LIVING (ADL)
ADLS_ACUITY_SCORE: 9
ADLS_ACUITY_SCORE: 9
ADLS_ACUITY_SCORE: 7
ADLS_ACUITY_SCORE: 9

## 2021-09-23 NOTE — PLAN OF CARE
Took over care of pt from [phone]. Pt is a/o, up with SBA and her 4 wheel walker. Up to chair for dinner. Baseline oxygen is 2.5L- currently on 3L.  Plan to discharge to [facility] living 1-2 days.

## 2021-09-23 NOTE — PLAN OF CARE
Pt A&Ox4. VSS. Up SBA-gait belt/walker. On 3L O2- 93%. Pt given pocket talker per pt request. Hopeful for discharge to [facility] living when medically stable. JOSIAS-CC/PT/OT following.

## 2021-09-23 NOTE — PROGRESS NOTES
[facility]  Hospitalist Progress Note  Lokesh Slaughter MD 09/23/2021    Reason for Stay (Diagnosis): COVID, COPD         Assessment and Plan:      Summary of Stay: Marie Kline is a 89-year-old female with a history of chronic respiratory failure oxygen dependent due to COPD, chronic lymphocytic leukemia with chronic leukocytosis, who presented to the hospital today for worsening shortness of breath, fever and weakness in the setting of a recent diagnosis of COVID-19 pneumonia.  The patient continues to feels dyspneic.  She lives alone and feels too weak to return home right now.   1.  Acute on chronic hypoxic respiratory failure due to COVID-19 infection.  - Continue Dexamethasone x 10 days  - Remdesivir not started given elevated baseline LFTs and minimal increase in oxygen requirements compared to baseline  - Lovenox for DVT ppx  -Continue PPI for GI ppx  -Continue Ativan for anxiety as needed.  -Patient is vaccinated for COVID-19.    2.   Acute on chronic respiratory failure secondary to COPD exacerbation and COVID-19 infection.  -At baseline she is on 2 L of oxygen .  -Continue bronchodilators.  -Continue steroid.  -Titrate oxygen down as able to baseline 2 L/min .    3.  Chronic lymphocytic leukemia with chronic leukocytosis.  -Baseline WBC in the 50,000-100,000 range.    -Receives intermittent IVIG infusions and follows with Dr. Ivory of Oncology.  - increase in WBC count since admit due to steroids.  -Today's 143K.  -If it continues to increase we will ask her oncologist tomorrow.    4.  Questionable retrograde infiltrate on chest x-ray without symptoms of bacterial pneumonia including cough or sputum color change.  procalcitonin level low.  Antibiotics not administered as low suspicion for bacterial PNA.      5.  Deconditioning/weakness  - appreciate rehab eval.  TCU recommended    6. Mild hyperkalemia and hyponatremia.   -Potassium 3.5 today. Na is 134.   -Check BMP in AM.     CODE:   "DNR/DNI.  PPX:  Lovenox, PPI.  DISPO:  TCU recommended by PT.  Anticipate discharge if bed is available. Since there is no TCU bed available, if there is a safe discharge plan in place for her and family on board, she will likely be discharged home with home health care by tomorrow. If TCU bed is available, she can be discharged anytime.  Noted care coordinator note, patient should be at his baseline to return to her independent living facility apartment with home care support.  Overall she is improving expect discharge home tomorrow if the functional status improvs to baseline.  I called  her daughter, Margaret and discussed with her the plan of care and the discharge plan,      Interval History (Subjective):      Patient seen and examined, no new overnight issues, feels better, on 3 liters of oxygen. She has shortness of breath, no cough, no runny nose, no fever or chills .                  Physical Exam:      Last Vital Signs:  /48 (BP Location: Left arm)   Pulse 61   Temp 97.8  F (36.6  C) (Temporal)   Resp 22   Ht 1.422 m (4' 8\")   Wt 38.5 kg (84 lb 14.4 oz)   LMP  (LMP Unknown)   SpO2 91%   BMI 19.03 kg/m        Constitutional: Awake, alert, cooperative, no apparent distress   Respiratory: Scattered crackles bilaterally, prolonged expiratory phases with wheezing.   Cardiovascular: Regular rate and rhythm, normal S1 and S2, and no murmur noted   Abdomen: Normal bowel sounds, soft, non-distended, non-tender   Skin: No rashes, no cyanosis, dry to touch   Neuro: Alert and oriented x3, no weakness, numbness, memory loss   Extremities: No edema, normal range of motion   Other(s):  Please comment cooperative       All other systems: Negative          Medications:      All current medications were reviewed with changes reflected in problem list.  Current Facility-Administered Medications   Medication     acetaminophen (TYLENOL) tablet 650 mg    Or     acetaminophen (TYLENOL) Suppository 650 mg     albuterol " (PROAIR HFA/PROVENTIL HFA/VENTOLIN HFA) 108 (90 Base) MCG/ACT inhaler 2 puff     albuterol (PROVENTIL) neb solution 2.5 mg     aspirin EC tablet 81 mg     atorvastatin (LIPITOR) tablet 20 mg     budesonide (PULMICORT) neb solution 0.5 mg     dexamethasone (DECADRON) tablet 6 mg     enoxaparin ANTICOAGULANT (LOVENOX) injection 30 mg     fluticasone (FLONASE) 50 MCG/ACT spray 1 spray     furosemide (LASIX) tablet 20 mg     ipratropium - albuterol 0.5 mg/2.5 mg/3 mL (DUONEB) neb solution 3 mL     levothyroxine (SYNTHROID/LEVOTHROID) tablet 75 mcg     lidocaine (LMX4) cream     lidocaine 1 % 0.1-1 mL     loperamide (IMODIUM) capsule 2 mg     LORazepam (ATIVAN) half-tab 0.25 mg     melatonin tablet 1 mg     metoprolol succinate ER (TOPROL-XL) 24 hr tablet 25 mg     ondansetron (ZOFRAN-ODT) ODT tab 4 mg    Or     ondansetron (ZOFRAN) injection 4 mg     pantoprazole (PROTONIX) EC tablet 40 mg     senna-docusate (SENOKOT-S/PERICOLACE) 8.6-50 MG per tablet 1 tablet    Or     senna-docusate (SENOKOT-S/PERICOLACE) 8.6-50 MG per tablet 2 tablet     sertraline (ZOLOFT) tablet 50 mg     sodium chloride (PF) 0.9% PF flush 3 mL     sodium chloride (PF) 0.9% PF flush 3 mL     traZODone (DESYREL) tablet 50 mg            Data:      All new lab and imaging data was reviewed.   Labs:   Recent Labs   Lab 09/23/21  0804 09/21/21  0853 09/20/21  1011   .7* 142.9* 90.9*   HGB 9.9* 10.7* 10.8*   HCT 32.9* 35.5 34.0*   MCV 96 95 93    189 149*     Recent Labs   Lab 09/23/21  0804 09/22/21  1347 09/21/21  0853    132* 131*   POTASSIUM 3.5 4.1 5.7*   CHLORIDE 97 96 95   CO2 33* 32 29   ANIONGAP 4 4 7   GLC 89 184* 136*   BUN 16 22 24   CR 0.72 0.70 0.76   GFRESTIMATED 74 77 70   CARLOS 8.7 8.4* 8.9     Recent Labs   Lab 09/23/21  0804 09/22/21  1347 09/21/21  0853 09/20/21  1012 09/20/21  1011   GLC 89 184* 136* 116* 115*     Imaging:   No results found for this or any previous visit (from the past 24 hour(s)).

## 2021-09-23 NOTE — PLAN OF CARE
To Do:  End of Shift Summary  For vital signs and complete assessments, please see documentation flowsheets.     Pertinent assessments: A&Ox4. Up w/ SBA gait belt and walker, up to chair, ambulating in juares. C/o headache in afternoon, tylenol given. Decrease in SOB reported today, on baseline 2.5L O2, sats in the low 90s. Coughing up moderate amount of thick, clear sputum.     Major Shift Events: uneventful    Treatment Plan: O2 needs, Decadron, Lovenox, Pepcid, inhaler, await for improvement in strength for D/C.     Bedside Nurse: Rico Chowdhury RN

## 2021-09-23 NOTE — PROGRESS NOTES
Care Management Follow Up    Length of Stay (days): 3    Expected Discharge Date:9/24/21     Concerns to be Addressed: home support at [facility]       Patient plan of care discussed at interdisciplinary rounds: Yes    Anticipated Discharge Disposition: home care interim RN/PT/OT/HHA [facility]      Anticipated Discharge Services:  RN/PT/OT/HHa  Anticipated Discharge DME:  [facility]    Patient/family educated on Medicare website which has current facility and service quality ratings: yes  Education Provided on the Discharge Plan:  Pt and dtg   Patient/Family in Agreement with the Plan: yes    Referrals Placed by CM/SW:  Home care     Additional Information:  [facility] called and said she can start off with an RN only as she is Covid +.  They would add the PT/OT/HHA support after 10 days of her having Covid +.  Bere: [phone].  Awaiting PT assessment.  Pt would need to be at baseline to return to her ILF apt with home care support.  She is usually on 2.5 L NC.  Following. Dtg updated and agreeable.      1315: PT still recommending TCU vs [facility].  Will continue to call TCU Covid units to check on availability.  I also called Alena [phone] and left a vm to see if more staffing was able to take on for residential services.        Zulay Pérez RN

## 2021-09-23 NOTE — PROGRESS NOTES
SW placed calls to TCU facilities accepting COVID positive pts to check on bed availability. JOSIAS spoke with Marie from the [facility] who reports they do not have any beds available or planned discharges at this time at either the [facility] or [facility]. She reports that they will reassess this on Monday and can be contacted at that time. SW left  for admissions at [facility] and with [facility] admissions liaison ([facility]) requesting call back.  DAVID Jones, LICSW    Addendum: SW received call back from [facility] indicating that they do not have any beds or movement on their unit anticipated at this time. She reports that SW can call back Monday to check on bed status.  1:38 PM

## 2021-09-24 ENCOUNTER — APPOINTMENT (OUTPATIENT)
Dept: PHYSICAL THERAPY | Facility: CLINIC | Age: 86
DRG: 177 | End: 2021-09-24
Payer: MEDICARE

## 2021-09-24 ENCOUNTER — APPOINTMENT (OUTPATIENT)
Dept: OCCUPATIONAL THERAPY | Facility: CLINIC | Age: 86
DRG: 177 | End: 2021-09-24
Payer: MEDICARE

## 2021-09-24 PROCEDURE — 99232 SBSQ HOSP IP/OBS MODERATE 35: CPT | Performed by: INTERNAL MEDICINE

## 2021-09-24 PROCEDURE — 120N000001 HC R&B MED SURG/OB

## 2021-09-24 PROCEDURE — 97535 SELF CARE MNGMENT TRAINING: CPT | Mod: GO

## 2021-09-24 PROCEDURE — 250N000011 HC RX IP 250 OP 636: Performed by: INTERNAL MEDICINE

## 2021-09-24 PROCEDURE — 250N000012 HC RX MED GY IP 250 OP 636 PS 637: Performed by: INTERNAL MEDICINE

## 2021-09-24 PROCEDURE — 250N000013 HC RX MED GY IP 250 OP 250 PS 637: Performed by: INTERNAL MEDICINE

## 2021-09-24 PROCEDURE — 97530 THERAPEUTIC ACTIVITIES: CPT | Mod: GO

## 2021-09-24 PROCEDURE — 250N000009 HC RX 250: Performed by: INTERNAL MEDICINE

## 2021-09-24 PROCEDURE — 97530 THERAPEUTIC ACTIVITIES: CPT | Mod: GP

## 2021-09-24 RX ADMIN — TRAZODONE HYDROCHLORIDE 50 MG: 50 TABLET ORAL at 22:02

## 2021-09-24 RX ADMIN — ASPIRIN 81 MG: 81 TABLET ORAL at 20:47

## 2021-09-24 RX ADMIN — FUROSEMIDE 20 MG: 20 TABLET ORAL at 08:28

## 2021-09-24 RX ADMIN — SERTRALINE HYDROCHLORIDE 50 MG: 50 TABLET ORAL at 22:02

## 2021-09-24 RX ADMIN — ATORVASTATIN CALCIUM 20 MG: 20 TABLET, FILM COATED ORAL at 22:02

## 2021-09-24 RX ADMIN — DEXAMETHASONE 6 MG: 2 TABLET ORAL at 08:27

## 2021-09-24 RX ADMIN — ENOXAPARIN SODIUM 30 MG: 30 INJECTION SUBCUTANEOUS at 20:47

## 2021-09-24 RX ADMIN — PANTOPRAZOLE SODIUM 40 MG: 40 TABLET, DELAYED RELEASE ORAL at 08:27

## 2021-09-24 RX ADMIN — IPRATROPIUM BROMIDE AND ALBUTEROL 1 PUFF: 20; 100 SPRAY, METERED RESPIRATORY (INHALATION) at 11:28

## 2021-09-24 RX ADMIN — METOPROLOL SUCCINATE 25 MG: 25 TABLET, EXTENDED RELEASE ORAL at 08:30

## 2021-09-24 RX ADMIN — LEVOTHYROXINE SODIUM 75 MCG: 0.07 TABLET ORAL at 20:47

## 2021-09-24 ASSESSMENT — ACTIVITIES OF DAILY LIVING (ADL)
ADLS_ACUITY_SCORE: 11
ADLS_ACUITY_SCORE: 7
ADLS_ACUITY_SCORE: 11

## 2021-09-24 NOTE — PLAN OF CARE
End of Shift Summary  For vital signs and complete assessments, please see documentation flowsheets.     Pertinent assessments: A&Ox4. Up w/ SBA gait belt and walker, up bathroom no productive sputum this shift, SOB with activities, on O2 2L baseline, LS diminished, BENJAMIN with activities,denies pain, skin bruised. Good appetites, voids adequately without difficulty.    Major Shift Events: uneventful    Treatment Plan: O2 needs, Decadron, Lovenox, Pepcid, inhaler, await for improvement in strength for D/C. Plan to discharge home/ILF with daughter RN/PT/OT/HHA on 9/25/21    Bedside Nurse:Camacho OVERTON RN

## 2021-09-24 NOTE — PROGRESS NOTES
[facility]  Hospitalist Progress Note  Lokesh Slaughter MD 09/24/2021    Reason for Stay (Diagnosis): COVID, COPD         Assessment and Plan:      Summary of Stay: Marie Kline is a 89-year-old female with a history of chronic respiratory failure oxygen dependent due to COPD, chronic lymphocytic leukemia with chronic leukocytosis, who presented to the hospital today for worsening shortness of breath, fever and weakness in the setting of a recent diagnosis of COVID-19 pneumonia.  The patient continues to feels dyspneic.  She lives alone and feels too weak to return home right now.   1.  Acute on chronic hypoxic respiratory failure due to COVID-19 infection.  -Continue Dexamethasone x 10 days  -Remdesivir not started given elevated baseline LFTs and minimal increase in oxygen requirements compared to baseline  -Lovenox for DVT ppx  -Continue PPI for GI ppx  -Continue Ativan for anxiety as needed.  -Patient is vaccinated for COVID-19.    2.   Acute on chronic respiratory failure secondary to COPD exacerbation and COVID-19 infection.  -At baseline she is on 2 L of oxygen .  -Continue bronchodilators.  -Continue steroid.  -Titrate oxygen down as able to baseline 2 L/min .    3.  Chronic lymphocytic leukemia with chronic leukocytosis.  -Baseline WBC in the 50,000-100,000 range.    -Receives intermittent IVIG infusions and follows with Dr. Ivory of Oncology.  - increase in WBC count since admit due to steroids.  -Today's 143K.  -If it continues to increase we will ask her oncologist tomorrow.    4.  Questionable retrograde infiltrate on chest x-ray without symptoms of bacterial pneumonia including cough or sputum color change.  procalcitonin level low.  Antibiotics not administered as low suspicion for bacterial PNA.      5.  Deconditioning/weakness  - Appreciate rehab eval.    -TCU recommended    6. Mild hyperkalemia and hyponatremia.   -Potassium 3.5 today. Na is 134.   -Check BMP in AM.     CODE:   "DNR/DNI.  PPX:  Lovenox, PPI.  DISPO:  TCU recommended by PT.  Anticipate discharge if bed is available.   Since there is no TCU bed available, if there is a safe discharge plan in place for her and family on board, she will likely be discharged home with home health care likely tomorrow. I discussed with care coordinator. Patient will have a family member available to be with her starting tomorrow per CC..        Interval History (Subjective):      Patient seen and examined, no new overnight issues, feels better, on 2 liters of oxygen. She has shortness of breath, no cough, no runny nose, no fever or chills .                  Physical Exam:      Last Vital Signs:  /51 (BP Location: Left arm)   Pulse 60   Temp 97.7  F (36.5  C) (Oral)   Resp 20   Ht 1.422 m (4' 8\")   Wt 38.5 kg (84 lb 14.4 oz)   LMP  (LMP Unknown)   SpO2 94%   BMI 19.03 kg/m        Constitutional: Awake, alert, cooperative, no apparent distress   Respiratory: Scattered crackles bilaterally, prolonged expiratory phases with wheezing.   Cardiovascular: Regular rate and rhythm, normal S1 and S2, and no murmur noted   Abdomen: Normal bowel sounds, soft, non-distended, non-tender   Skin: No rashes, no cyanosis, dry to touch   Neuro: Alert and oriented x3, no weakness, numbness, memory loss   Extremities: No edema, normal range of motion   Other(s): Please comment cooperative       All other systems: Negative          Medications:      All current medications were reviewed with changes reflected in problem list.  Current Facility-Administered Medications   Medication     acetaminophen (TYLENOL) tablet 650 mg    Or     acetaminophen (TYLENOL) Suppository 650 mg     albuterol (PROAIR HFA/PROVENTIL HFA/VENTOLIN HFA) 108 (90 Base) MCG/ACT inhaler 2 puff     aspirin EC tablet 81 mg     atorvastatin (LIPITOR) tablet 20 mg     budesonide (PULMICORT) neb solution 0.5 mg     dexamethasone (DECADRON) tablet 6 mg     enoxaparin ANTICOAGULANT (LOVENOX) " injection 30 mg     fluticasone (FLONASE) 50 MCG/ACT spray 1 spray     furosemide (LASIX) tablet 20 mg     ipratropium-albuterol (COMBIVENT RESPIMAT) inhaler 1 puff     levothyroxine (SYNTHROID/LEVOTHROID) tablet 75 mcg     lidocaine (LMX4) cream     lidocaine 1 % 0.1-1 mL     loperamide (IMODIUM) capsule 2 mg     LORazepam (ATIVAN) half-tab 0.25 mg     melatonin tablet 1 mg     metoprolol succinate ER (TOPROL-XL) 24 hr tablet 25 mg     ondansetron (ZOFRAN-ODT) ODT tab 4 mg    Or     ondansetron (ZOFRAN) injection 4 mg     pantoprazole (PROTONIX) EC tablet 40 mg     senna-docusate (SENOKOT-S/PERICOLACE) 8.6-50 MG per tablet 1 tablet    Or     senna-docusate (SENOKOT-S/PERICOLACE) 8.6-50 MG per tablet 2 tablet     sertraline (ZOLOFT) tablet 50 mg     sodium chloride (PF) 0.9% PF flush 3 mL     sodium chloride (PF) 0.9% PF flush 3 mL     traZODone (DESYREL) tablet 50 mg            Data:      All new lab and imaging data was reviewed.   Labs:   Recent Labs   Lab 09/23/21  0804 09/21/21  0853 09/20/21  1011   .7* 142.9* 90.9*   HGB 9.9* 10.7* 10.8*   HCT 32.9* 35.5 34.0*   MCV 96 95 93    189 149*     Recent Labs   Lab 09/23/21  0804 09/22/21  1347 09/21/21  0853    132* 131*   POTASSIUM 3.5 4.1 5.7*   CHLORIDE 97 96 95   CO2 33* 32 29   ANIONGAP 4 4 7   GLC 89 184* 136*   BUN 16 22 24   CR 0.72 0.70 0.76   GFRESTIMATED 74 77 70   CARLOS 8.7 8.4* 8.9     Recent Labs   Lab 09/23/21  0804 09/22/21  1347 09/21/21  0853 09/20/21  1012 09/20/21  1011   GLC 89 184* 136* 116* 115*     Imaging:   No results found for this or any previous visit (from the past 24 hour(s)).

## 2021-09-24 NOTE — PROGRESS NOTES
Care Management Follow Up    Length of Stay (days): 4    Expected Discharge Date: 09/25/2021     Concerns to be Addressed:  Home care      Patient plan of care discussed at interdisciplinary rounds: Yes    Anticipated Discharge Disposition: Home Care     Anticipated Discharge Services:  RN/PT/OT/HHA  Anticipated Discharge DME: Oxygen    Patient/family educated on Medicare website which has current facility and service quality ratings: yes  Education Provided on the Discharge Plan:  Dtg  Patient/Family in Agreement with the Plan: yes    Referrals Placed by CM/SW: Homecare    Additional Information:  I called Alena PINZON from [facility]  They said they still won't having staffing till end of next week.  Dtg Margaret is agreeable to stay with Pt 24/7 starting Saturday . [facility] RN/PT/OT/HHA.  Awaiting PT assessment. Pt back on 2.5 L NC 02      11:33 Dtg is worried about discharge . Her mother tells her she is weak . PT recommends discharge to ILF with Dtg 24/7 and home care . Will reassess tomorrow with PT.  Attempted to call Pt, but no answer.  Will have RN update pt.    Yarely Pérez RN BSN   Inpatient Care Coordination  [facility]  [phone]        Zulay Pérez, RN

## 2021-09-24 NOTE — PROGRESS NOTES
End of Shift Summary [id number]  For vital signs and complete assessments, please see documentation flowsheets.     Pertinent assessments: A&Ox4. Weaned down to 2 L O2 via NC with continuous pulse ox monitoring. LS diminished. BENJAMIN. Denies pain. VSS.     Treatment Plan: O2 needs, Decadron, Lovenox, Pepcid, inhaler/nebs, PT/OT -await for improvement in strength for D/C.   Bedside Nurse: Ashley Mccoy RN

## 2021-09-24 NOTE — PLAN OF CARE
End of Shift Summary [id number]  For vital signs and complete assessments, please see documentation flowsheets.     Pertinent assessments: A&Ox4. Up w/ SBA gait belt and walker, up bathroom no productive sputum this shift  Major Shift Events: none    Treatment Plan: O2 needs, Decadron, Lovenox, Pepcid, inhaler, await for improvement in strength for D/C.     Bedside Nurse: Lisa Oswald RN on 9/23/2021 at 10:41 PM

## 2021-09-25 VITALS
RESPIRATION RATE: 18 BRPM | SYSTOLIC BLOOD PRESSURE: 139 MMHG | HEART RATE: 69 BPM | OXYGEN SATURATION: 93 % | TEMPERATURE: 97.6 F | DIASTOLIC BLOOD PRESSURE: 43 MMHG | WEIGHT: 84.9 LBS | HEIGHT: 56 IN | BODY MASS INDEX: 19.1 KG/M2

## 2021-09-25 LAB
BACTERIA BLD CULT: NO GROWTH
BACTERIA BLD CULT: NO GROWTH

## 2021-09-25 PROCEDURE — 99239 HOSP IP/OBS DSCHRG MGMT >30: CPT | Performed by: INTERNAL MEDICINE

## 2021-09-25 PROCEDURE — 250N000012 HC RX MED GY IP 250 OP 636 PS 637: Performed by: INTERNAL MEDICINE

## 2021-09-25 PROCEDURE — 94640 AIRWAY INHALATION TREATMENT: CPT

## 2021-09-25 PROCEDURE — 250N000009 HC RX 250: Performed by: INTERNAL MEDICINE

## 2021-09-25 PROCEDURE — 250N000013 HC RX MED GY IP 250 OP 250 PS 637: Performed by: INTERNAL MEDICINE

## 2021-09-25 PROCEDURE — 999N000157 HC STATISTIC RCP TIME EA 10 MIN

## 2021-09-25 RX ORDER — PANTOPRAZOLE SODIUM 40 MG/1
40 TABLET, DELAYED RELEASE ORAL
Qty: 30 TABLET | Refills: 0 | Status: SHIPPED | OUTPATIENT
Start: 2021-09-26 | End: 2021-12-07

## 2021-09-25 RX ORDER — DEXAMETHASONE 6 MG/1
6 TABLET ORAL DAILY
Qty: 4 TABLET | Refills: 0 | Status: SHIPPED | OUTPATIENT
Start: 2021-09-26 | End: 2021-09-30

## 2021-09-25 RX ADMIN — BUDESONIDE 0.5 MG: 0.5 INHALANT RESPIRATORY (INHALATION) at 07:36

## 2021-09-25 RX ADMIN — FUROSEMIDE 20 MG: 20 TABLET ORAL at 08:29

## 2021-09-25 RX ADMIN — DEXAMETHASONE 6 MG: 2 TABLET ORAL at 08:29

## 2021-09-25 RX ADMIN — PANTOPRAZOLE SODIUM 40 MG: 40 TABLET, DELAYED RELEASE ORAL at 08:29

## 2021-09-25 ASSESSMENT — ACTIVITIES OF DAILY LIVING (ADL)
ADLS_ACUITY_SCORE: 11
ADLS_ACUITY_SCORE: 9
ADLS_ACUITY_SCORE: 9
ADLS_ACUITY_SCORE: 11
ADLS_ACUITY_SCORE: 11

## 2021-09-25 NOTE — PLAN OF CARE
Patient hospitalized for COVID 19. Cleared for discharge to [facility] with daughter today per MD. Discharge instructions, medications, and follow-ups reviewed with patient and daughter Margaret in detail. Discharge medications, including decadron, protonix  were filled here and given to patient. Patient and daughter verbalized understanding of discharge instructions. Belongings were returned to patient at time of discharge. daughter providing transport home.     End of Shift Summary  For vital signs and complete assessments, please see documentation flowsheets.     Pertinent assessments: Pt resting comfortably. VSS on 2L O2 (baseline). MD notified of pt c/o 4/10 chest pressure after an emesis. No new orders, pt okay to discharge per MD. Pt now resting comfortably and denies pain and denies SOB. Productive cough, declined intervention. BENJAMIN with activity.

## 2021-09-25 NOTE — PLAN OF CARE
To Do:  End of Shift Summary  For vital signs and complete assessments, please see documentation flowsheets.     Pertinent assessments: A/Ox4. O2 2L with NC (baseline). Denies pain. Non productive cough, declined intervention. BENJAMIN with activity.  Major Shift Events: uneventful  Treatment Plan: Decadron, Lovenox. Plan to discharge ILF with daughter.  Bedside Nurse: Fide Melvin RN

## 2021-09-25 NOTE — PROVIDER NOTIFICATION
MD notified d/t pt c/o of 4/10 constant chest pressure after one episode of emesis. VSS. Please advise.   No new orders, pt okay to discharge per MD.

## 2021-09-25 NOTE — PROVIDER NOTIFICATION
Care Management Discharge Note    Discharge Date: 09/25/2021       Discharge Disposition:  Home Care  RN/PT/OT/    Discharge Services: None    Discharge DME: None    Discharge Transportation: family or friend will provide  They will bring o2 tank but want staff to bring pt to the floor.        Patient/family educated on Medicare website which has current facility and service quality ratings: yes    Education Provided on the Discharge Plan:  yes  Persons Notified of Discharge Plans: Itz Robles   Patient/Family in Agreement with the Plan: yes    Handoff Referral Completed: No    Additional Information:     09/25/21 1300   Final Resources   Home Care   ([facility] )   Pt will d.c home with [facility] for RN/OTPT support and family support unit [facility] can provide ALS services  Pt family do have lift chair for pt.. Daughter is nervous about taking pt home  SW did speak with Rehab staff who assured SW that pt is Independent in Bed Mobility and SBA for support for ambulation     Daughter did agree to take pt home today but requested she have dinner here  The will bring o2 tank here and have staff bring pt down to the car,.     WIll fax to [facility] home care [phone]  Fax [phone]        Corinne C. White, LSW

## 2021-09-25 NOTE — PROGRESS NOTES
Pt remains on 2 lpm nasal cannula.  Breath Sounds clear and diminished.  Pulmicort neb given as scheduled this shift, no change after treatment.  RT to monitor and assess as needed.    Aydee Loya, RRT

## 2021-09-25 NOTE — PROGRESS NOTES
"[facility] RC[facility]     Date:  21  Admission Dx:  Covid 19  Pulmonary History  COPD, at baseline  Home Nebulizer/MDI Use:  Albuterol Neb and MDI prn, Dulera QD   Acuity Level (RCAT flow sheet):  4  Tobacco history:     Tobacco Use      Smoking status: Former Smoker        Types: Cigarettes        Quit date: 2/3/1996        Years since quittin.6      Smokeless tobacco: Never Used       Aerosol Therapy initiated:  Combivent Q6 prn      Pulmonary Hygiene initiated:  NA      Volume Expansion initiated:  NA      Current Oxygen Requirements:  2L (home)  Current SpO2:  92%    Re-evaluation date:  21    Patient Education:  Will educate on use of MDI      See \"RT Assessments\" flow sheet for patient assessment scoring and Acuity Level Details.    Berna Richards, RT         "

## 2021-09-26 NOTE — PLAN OF CARE
Physical Therapy Discharge Summary    Reason for therapy discharge:    Discharged to home with home therapy.    Progress towards therapy goal(s). See goals on Care Plan in [facility] electronic health record for goal details.  Goals partially met.  Barriers to achieving goals:   limited tolerance for therapy and discharge from facility.    Therapy recommendation(s):    Continued therapy is recommended.  Rationale/Recommendations:  Home with Home PT and assist from daughter for all mobility and cares.

## 2021-09-26 NOTE — PLAN OF CARE
Occupational Therapy Discharge Summary    Reason for therapy discharge:    Discharged to home with home therapy.    Progress towards therapy goal(s). See goals on Care Plan in [facility] electronic health record for goal details.  Goals partially met.  Barriers to achieving goals:   limited tolerance for therapy and discharge from facility.    Therapy recommendation(s):    Continued therapy is recommended.  Rationale/Recommendations:  Pt would benefit from continued OT to maximize safety and participation in self cares with improved strength and endurance.

## 2021-09-27 NOTE — DISCHARGE SUMMARY
[facility]  Discharge Summary  Name: Marie Kline    MRN: [id number]  YOB: 1932    Age: 89 year old  Date of Discharge:  9/25/2021  6:20 PM  Date of Admission: 9/20/2021  Primary Care Provider: Nehal Freeman  Discharge Physician:  Santiago Lamar DO  Discharging Service:  Hospitalist      Discharge Diagnosis:  Acute on chronic hypoxic respiratory failure secondary to COVID-19 pneumonia  COPD exacerbation  Chronic lymphocytic leukemia with chronic leukocytosis  Deconditioning and generalized weakness  Hyponatremia  Hypokalemia     Other Diagnosis:  none     Discharge Disposition:  Discharged to home     Allergies:  Allergies   Allergen Reactions     Diagnostic X-Ray Materials Hives     CT DYE     Contrast Dye Hives     CT DYE     Prolia [Denosumab]      Osteonecrosis jaw       Wound Dressing Adhesive         Discharge Medications:   Discharge Medication List as of 9/25/2021  5:04 PM      START taking these medications    Details   dexamethasone (DECADRON) 6 MG tablet Take 1 tablet (6 mg) by mouth daily, Disp-4 tablet, R-0, E-Prescribe      pantoprazole (PROTONIX) 40 MG EC tablet Take 1 tablet (40 mg) by mouth every morning (before breakfast), Disp-30 tablet, R-0, E-Prescribe         CONTINUE these medications which have NOT CHANGED    Details   albuterol (PROVENTIL HFA) 108 (90 Base) MCG/ACT inhaler Inhale 2 puffs into the lungs every 6 hours as needed for shortness of breath / dyspnea or wheezing, HistoricalPharmacy may dispense brand covered by insurance (Proair, or proventil or ventolin or generic albuterol inhaler)      aspirin 81 MG EC tablet Take 81 mg by mouth every evening , Historical      atorvastatin (LIPITOR) 20 MG tablet Take 1 tablet (20 mg) by mouth daily, Disp-90 tablet, R-4, E-Prescribe      budesonide (PULMICORT) 0.5 MG/2ML neb solution Take 0.5 mg by nebulization 2 times daily, Historical      ferrous sulfate (IRON) 325 (65 FE) MG tablet Take 325 mg by mouth daily (with  "breakfast) , Historical      fluticasone (FLONASE) 50 MCG/ACT nasal spray Spray 1 spray into both nostrils daily, Disp-16 g, R-0, E-Prescribe      furosemide (LASIX) 20 MG tablet Take 1 tablet (20 mg) by mouth daily, Disp-90 tablet, R-4, E-Prescribe      Immune Globulin, Human, (OCTAGAM) 5 GM/100ML SOLN 300 mg/kg into the vein every 30 days    Hold this medication while in TCU-resume at discharge from TCU, Transitional      ipratropium - albuterol 0.5 mg/2.5 mg/3 mL (DUONEB) 0.5-2.5 (3) MG/3ML neb solution Take 1 vial by nebulization 3 times daily, Historical      levothyroxine (SYNTHROID/LEVOTHROID) 75 MCG tablet Take 1 tablet (75 mcg) by mouth daily, Disp-90 tablet, R-3, E-PrescribeProfile Rx: patient will contact pharmacy when needed      loperamide (IMODIUM) 2 MG capsule Take 2 mg by mouth daily as needed for diarrhea, Historical      metoprolol succinate ER (TOPROL-XL) 25 MG 24 hr tablet Take 1 tablet (25 mg) by mouth daily, Disp-90 tablet, R-3, E-PrescribeProfile Rx: patient will contact pharmacy when needed      Multiple Vitamins-Minerals (MULTIVITAMIN ADULT) CHEW Take 2 chew tab by mouth daily, Historical      sertraline (ZOLOFT) 50 MG tablet Take 1 tablet (50 mg) by mouth daily, Disp-90 tablet, R-1, E-PrescribeProfile Rx: patient will contact pharmacy when needed      traZODone (DESYREL) 50 MG tablet Take 50 mg by mouth At Bedtime, Historical      ACE/ARB/ARNI NOT PRESCRIBED (INTENTIONAL) Reason ACE/ARB was Not Prescribed: Symptomatic hypotension not due to excessive diuresisNo Print Out      albuterol (PROVENTIL) (2.5 MG/3ML) 0.083% neb solution Take 1 vial (2.5 mg) by nebulization every 6 hours as needed for shortness of breath / dyspnea or wheezing, Disp-3 mL, R-1, E-Prescribe              Condition on Discharge:  Discharge condition: Fair   Discharge vitals: Blood pressure 139/43, pulse 69, temperature 97.6  F (36.4  C), temperature source Axillary, resp. rate 18, height 1.422 m (4' 8\"), weight 38.5 kg " (84 lb 14.4 oz), SpO2 93 %, not currently breastfeeding.   Code status on discharge: DNR / DNI     History of Illness:  See detailed admission note for full details.    Significant Physical Exam Findings:    Constitutional: Awake, alert, cooperative, no apparent distress   Respiratory: Scattered crackles bilaterally, prolonged expiratory phases with wheezing.   Cardiovascular: Regular rate and rhythm, normal S1 and S2, and no murmur noted   Abdomen: Normal bowel sounds, soft, non-distended, non-tender   Skin: No rashes, no cyanosis, dry to touch   Neuro: Alert and oriented x3, no weakness, numbness, memory loss   Extremities: No edema, normal range of motion   Other(s): Please comment cooperative         All other systems: Negative     Procedures other than Imaging:  none     Imaging:  Results for orders placed or performed during the hospital encounter of 09/20/21   XR Chest Port 1 View    Narrative    CHEST ONE VIEW PORTABLE September 20, 2021 11:02 AM     HISTORY: Shortness of breath. Hypoxia, diagnosis of COVID seven days  ago.    COMPARISON: 7/4/2021.      Impression    IMPRESSION: The lungs appear emphysematous. There is some airspace  opacity in the retrocardiac left lower lobe, possibly developing  pneumonia. No pneumothorax or pleural effusion.    PADMINI MOULTON MD         SYSTEM ID:  [id number]        Consultations:  None     Recent Lab Results:  Recent Labs   Lab 09/23/21  0804 09/21/21  0853   .7* 142.9*   HGB 9.9* 10.7*   HCT 32.9* 35.5   MCV 96 95    189     Recent Labs   Lab 09/23/21  0804 09/22/21  1347 09/21/21  0853    132* 131*   POTASSIUM 3.5 4.1 5.7*   CHLORIDE 97 96 95   CO2 33* 32 29   ANIONGAP 4 4 7   GLC 89 184* 136*   BUN 16 22 24   CR 0.72 0.70 0.76   GFRESTIMATED 74 77 70   CARLOS 8.7 8.4* 8.9          Pending Results:    Unresulted Labs Ordered in the Past 30 Days of this Admission     No orders found from 8/21/2021 to 9/21/2021.              Medication Therapy  Management Referral      Home care nursing referral      Home Care PT Referral for Hospital Discharge      Home Care OT Referral for Hospital Discharge      Care Coordination Referral      Reason for your hospital stay    Acute hypoxic respiratory failure     Follow-up and recommended labs and tests     Follow up with primary care provider, Nehal Freeman, within 7 days for hospital follow- up.  The following labs/tests are recommended: CBC, BMP in 1 week result to primary care provider.  Follow-up with hematology oncology as instructed.     Activity    Your activity upon discharge: activity as tolerated     MD face to face encounter    Documentation of Face to Face and Certification for Home Health Services    I certify that patient: Marie Kline is under my care and that I, or a nurse practitioner or physician's assistant working with me, had a face-to-face encounter that meets the physician face-to-face encounter requirements with this patient on: 9/25/2021.    This encounter with the patient was in whole, or in part, for the following medical condition, which is the primary reason for home health care: Generalized weakness, COVID-19 related respiratory failure,.    I certify that, based on my findings, the following services are medically necessary home health services: Nursing, Occupational Therapy, and Physical Therapy.    My clinical findings support the need for the above services because: Nurse is needed: To provide assessment and oversight required in the home to assure adherence to the medical plan due to: Patient's underlying cognitive decline, deconditioning, worsening respiratory failure, recent COVID-19 infection, Occupational Therapy Services are needed to assess and treat cognitive ability and address ADL safety due to impairment in cognitive function and also balance, and Physical Therapy Services are needed to assess and treat the following functional impairments: Deconditioning, generalized  weakness, increased risk of fall.    Further, I certify that my clinical findings support that this patient is homebound (i.e. absences from home require considerable and taxing effort and are for medical reasons or [employer] services or infrequently or of short duration when for other reasons) because: Requires assistance of another person or specialized equipment to access medical services because patient: Requires supervision of another for safe transfer...    Based on the above findings. I certify that this patient is confined to the home and needs intermittent skilled nursing care, physical therapy and/or speech therapy.  The patient is under my care, and I have initiated the establishment of the plan of care.  This patient will be followed by a physician who will periodically review the plan of care.  Physician/Provider to provide follow up care: Nehal Freeman    Attending hospital physician (the Medicare certified [place] provider): Lokesh Slaughter MD  Physician Signature: See electronic signature associated with these discharge orders.  Date: 9/25/2021     Oxygen Adult/Peds     Diet    Follow this diet upon discharge: Orders Placed This Encounter      Combination Diet Regular Diet Adult       Hospital Course:  Marie Kline is a 89-year-old female with a history of chronic respiratory failure oxygen dependent due to COPD, chronic lymphocytic leukemia with chronic leukocytosis, who presented to the hospital for worsening shortness of breath, fever, weakness in the setting of a recent diagnosis of COVID-19 pneumonia.   1.  Acute on chronic hypoxic respiratory failure due to COVID-19 infection.  Patient continued to have dexamethasone x 10 days. Remdesivir not started given elevated baseline LFTs and minimal increase in oxygen requirements compared to baseline.  Is on Lovenox for DVT prophylaxis while here, on Ativan for anxiety.  It is noted that patient was vaccinated for COVID-19.     2.   Acute on  chronic respiratory failure secondary to COPD exacerbation and COVID-19 infection. At baseline she is on 2 L of oxygen.  Patient remains on 2 L of oxygen, she will be on bronchodilators, treated with steroids.  Discharged on home oxygen at baseline.     3.  Chronic lymphocytic leukemia with chronic leukocytosis. Baseline WBC in the 50,000-100,000 range. Receives intermittent IVIG infusions and follows with Dr. Ivory of Oncology.  WBC increased this time, she will have follow-up with her oncologist as an outpatient.  Is partly due to the steroid she has been on while in the hospital     4.  Questionable retrograde infiltrate on chest x-ray without symptoms of bacterial pneumonia including cough or sputum color change.  procalcitonin level low.  Antibiotics not administered as low suspicion for bacterial PNA.  Suspected its likelyy worsening due to underlying Covid infection.     5.  Deconditioning/weakness.  She was evaluated by PT and OT.  It was recommended to send patient to transitional care unit, but was not available and she was discharged home with home health services.     6. Mild hyperkalemia and hyponatremia.  Potassium in replaced and corrected.  Sodium was 134.  She will have her BMP checked as an outpatient in 1 week.     I discussed with patient the plan of discharge and follow-up, I had also discussion with her daughter about the overall plan.       Total time spent in face to face contact with the patient and coordinating discharge was:  >30 Minutes.

## 2021-09-29 ENCOUNTER — TELEPHONE (OUTPATIENT)
Dept: INTERNAL MEDICINE | Facility: CLINIC | Age: 86
End: 2021-09-29

## 2021-09-29 ENCOUNTER — VIRTUAL VISIT (OUTPATIENT)
Dept: INTERNAL MEDICINE | Facility: CLINIC | Age: 86
End: 2021-09-29
Payer: MEDICARE

## 2021-09-29 DIAGNOSIS — H91.93 BILATERAL HEARING LOSS, UNSPECIFIED HEARING LOSS TYPE: ICD-10-CM

## 2021-09-29 DIAGNOSIS — Z92.89 HOSPITALIZATION WITHIN LAST 30 DAYS: Primary | ICD-10-CM

## 2021-09-29 DIAGNOSIS — J18.9 PNEUMONIA DUE TO INFECTIOUS ORGANISM, UNSPECIFIED LATERALITY, UNSPECIFIED PART OF LUNG: ICD-10-CM

## 2021-09-29 DIAGNOSIS — U07.1 2019 NOVEL CORONAVIRUS DISEASE (COVID-19): ICD-10-CM

## 2021-09-29 PROCEDURE — 99442 PR PHYSICIAN TELEPHONE EVALUATION 11-20 MIN: CPT | Mod: 95 | Performed by: NURSE PRACTITIONER

## 2021-09-29 NOTE — TELEPHONE ENCOUNTER
Spoke with daughter.  States patient needs an appointment to have her ears looked at and possibly cleaned.    Appointment scheduled.    Next 5 appointments (look out 90 days)    Sep 30, 2021  1:00 PM  Telephone Visit with Yuliana Cast RPH  [facility] ([facility] ) [address]  [address]  [facility] [address]  [phone]   Oct 06, 2021  8:00 AM  SHORT with Chato Huang MD  [facility] ([facility] ) [address] [place] [address]  [phone]   Oct 25, 2021 11:30 AM  Return Visit with Marva Powell MD  [facility] ([facility] ) [id number] [place] [address] [address]  [facility] [address]  [phone]   Oct 25, 2021  5:40 PM  Office Visit with Dirk Bule MD  [facility] ([facility] ) [address] [place] [id number] [phone]

## 2021-09-29 NOTE — TELEPHONE ENCOUNTER
Reason for Call:  Other appointment    Detailed comments: Would like to get in sooner for appt. Patient needs to have ears cleaned before going to specialist. Appointment is 10/25/21 @ 5:40    Phone Number Patient can be reached at: Cell number on file:    Telephone Information:   Mobile [phone]       Best Time: anytime    Can we leave a detailed message on this number? YES    Call taken on 9/29/2021 at 12:44 PM by Renetta Flores

## 2021-09-29 NOTE — TELEPHONE ENCOUNTER
[facility] calling for a delay in start of care.due to staffing They will start her on 10/1/2021.  Bere [phone].

## 2021-09-29 NOTE — PROGRESS NOTES
Marie is a 89 year old who is being evaluated via a billable telephone visit.      What phone number would you like to be contacted at? [phone]  How would you like to obtain your AVS? Mail a copy    Assessment & Plan     Hospitalization within last 30 days for Covid 19 and suspected pneumonia  - hospitalized from 9/20 to 9/25/2021  - doing some better  - fluids and rest  - no change in medication at this time    Bilateral hearing loss, unspecified hearing loss type  - may need ears checked for wax impaction  - encouraged to make appointment with audiologist for hearing test and hearing aids (daughter was going to make that appointment)  [phone]}     Patient instructions:  See above.    Return in about 4 weeks (around 10/27/2021) for Follow up (patients request or sooner, in person.    Nehal Freeman CNP  [facility]    Anuj Salazar is a 89 year old who presents for the following health issues     HPI       Hospital Follow-up Visit:    Hospital/Nursing Home/ Rehab Facility: [facility]  Date of Admission: 9/20/21  Date of Discharge: 9/25/21  Reason(s) for Admission: COPD exacerbation, Hypoxia, Covid 19      Was your hospitalization related to COVID-19? YES   How are you feeling today? Better  In the past 24 hours have you had shortness of breath when speaking, walking, or climbing stairs? My breathing issues have stayed the same  Do you have a cough? Yes, I have a cough but it's not worse  When is the last time you had a fever greater than 100? When she was in the hospital  Are you having any other symptoms? Hard to tell because of COPD   Do you have any other stressors you would like to discuss with your provider? Health Concerns               Was the patient in the ICU or did the patient experience delirium during hospitalization?  No          Problems taking medications regularly:  None  Medication changes since discharge: None  Problems adhering to  non-medication therapy:  None    Summary of hospitalization:  [facility] discharge summary reviewed  Diagnostic Tests/Treatments reviewed.  Follow up needed: scheduled for f/u CT chest on 10/19/2021  Other Healthcare Providers Involved in Patient s Care:         None  Update since discharge: stable.       Post Discharge Medication Reconciliation: discharge medications reconciled, continue medications without change.  Plan of care communicated with patient and daughter              See above. Telephone visit (unable to do video visit) for f/u on hospitalization from 9/20 to 9/25/2021 for Covid and pneumonia.    Imaging:    CXR:     IMPRESSION: The lungs appear emphysematous. There is some airspace  opacity in the retrocardiac left lower lobe, possibly developing  pneumonia. No pneumothorax or pleural effusion.    Labs:    CBC with .7 H and Hgb 9.9 L    BMP with K on Discharge 134 ok (was 131 L)    Covid Positive    Overall doing ok although she is very hard of hearing.  States she is fatigued but no fever or cough.  No shortness of breathe more than usual.  No chest pain.  Asking to get her ears checked for hearing aids.        Review of Systems   Constitutional, HEENT, cardiovascular, pulmonary, gi and gu systems are negative, except as otherwise noted.      Objective           Vitals:  No vitals were obtained today due to virtual visit.    Physical Exam   alert and no distress; heard daughter in background; very hard of hearing over the phone and had poor reception and cut off several times  PSYCH: Alert and oriented times 3; coherent speech, normal   rate and volume, able to articulate logical thoughts, able   to abstract reason, no tangential thoughts, no hallucinations   or delusions  Her affect is normal  RESP: No cough, no audible wheezing, able to talk in full sentences  Remainder of exam unable to be completed due to telephone visits            Phone call duration: 15 minutes

## 2021-09-30 ENCOUNTER — PATIENT OUTREACH (OUTPATIENT)
Dept: CARE COORDINATION | Facility: CLINIC | Age: 86
End: 2021-09-30

## 2021-09-30 ENCOUNTER — VIRTUAL VISIT (OUTPATIENT)
Dept: PHARMACY | Facility: CLINIC | Age: 86
End: 2021-09-30
Attending: INTERNAL MEDICINE
Payer: COMMERCIAL

## 2021-09-30 DIAGNOSIS — E78.5 HYPERLIPIDEMIA WITH TARGET LDL LESS THAN 130: ICD-10-CM

## 2021-09-30 DIAGNOSIS — J30.89 SEASONAL ALLERGIC RHINITIS DUE TO OTHER ALLERGIC TRIGGER: ICD-10-CM

## 2021-09-30 DIAGNOSIS — E63.9 NUTRITIONAL DEFICIENCY: ICD-10-CM

## 2021-09-30 DIAGNOSIS — J18.9 COMMUNITY ACQUIRED PNEUMONIA, UNSPECIFIED LATERALITY: ICD-10-CM

## 2021-09-30 DIAGNOSIS — C91.10 CLL (CHRONIC LYMPHOCYTIC LEUKEMIA) (H): ICD-10-CM

## 2021-09-30 DIAGNOSIS — U07.1 2019 NOVEL CORONAVIRUS DISEASE (COVID-19): Primary | ICD-10-CM

## 2021-09-30 DIAGNOSIS — E03.9 HYPOTHYROIDISM, UNSPECIFIED TYPE: ICD-10-CM

## 2021-09-30 DIAGNOSIS — I10 ESSENTIAL HYPERTENSION WITH GOAL BLOOD PRESSURE LESS THAN 140/90: ICD-10-CM

## 2021-09-30 DIAGNOSIS — F51.01 PRIMARY INSOMNIA: ICD-10-CM

## 2021-09-30 DIAGNOSIS — F41.1 GAD (GENERALIZED ANXIETY DISORDER): ICD-10-CM

## 2021-09-30 DIAGNOSIS — J44.1 COPD WITH ACUTE EXACERBATION (H): ICD-10-CM

## 2021-09-30 PROCEDURE — 99606 MTMS BY PHARM EST 15 MIN: CPT | Performed by: PHARMACIST

## 2021-09-30 PROCEDURE — 99607 MTMS BY PHARM ADDL 15 MIN: CPT | Performed by: PHARMACIST

## 2021-09-30 RX ORDER — FLUTICASONE PROPIONATE 50 MCG
1 SPRAY, SUSPENSION (ML) NASAL DAILY PRN
COMMUNITY
Start: 2021-09-30 | End: 2022-04-11

## 2021-09-30 RX ORDER — IMMUNE GLOBULIN 50 MG/ML
SOLUTION INTRAVENOUS
COMMUNITY
Start: 2021-09-30

## 2021-09-30 NOTE — PROGRESS NOTES
Clinic Care Coordination Contact  [facility]/Voicemail       Clinical Data: Care Coordinator Outreach  Outreach attempted x 1.  Left message on patient's voicemail with call back information and requested return call.  Plan: Care Coordinator will try to reach patient again in 1-2 business days.      YESSI Vela  Clinic Care Coordinator  Ph. [phone]  [email or web address]

## 2021-09-30 NOTE — PATIENT INSTRUCTIONS
Recommendations from today's MTM visit:                                                       1.  Continue current regimen  2.  Follow-up with primary care provider as planned for your hearing.    Follow-up: Return in about 1 month (around 10/30/2021) for Medication Therapy Management.    It was great to speak with you today.  I value your experience and would be very thankful for your time with providing feedback on our clinic survey. You may receive a survey via email or text message in the next few days.     To schedule another MTM appointment, please call the clinic directly or you may call the MTM scheduling line at [phone] or toll-free at [phone].     My Clinical Pharmacist's contact information:                                                      Please feel free to contact me with any questions or concerns you have.      Yuliana Cast , Pharm D  [phone] (phone)  Medication Therapy Management Pharmacist

## 2021-09-30 NOTE — PROGRESS NOTES
Medication Therapy Management (MTM) Encounter    ASSESSMENT:                            Medication Adherence/Access: No issues identified    Pneumonia/COVID:  Improving.  Has follow-up [facility] primary care provider scheduled.    COPD: patient feels regimen has been helpful. No concerns.    CLL: stable    Hypertension: stable    Hyperlipidemia: stable    Hypothyroidism: stable    Anxiety:  stable    Insomnia: stable    Allergic rhinitis: stable    Supplements:  Stable, hgb low but improved.    PLAN:                            1.  Continue current regimen  2.  Follow-up with primary care provider as planned.    Follow-up: Return in about 1 month (around 10/30/2021) for Medication Therapy Management.      SUBJECTIVE/OBJECTIVE:                          Marie Kline is a 89 year old female called for a transitions of care visit. She was discharged from [facility] on 9/25/21 for Pneumonia. Patient was accompanied by Ed, her son..   Last MTM visit 4/20/21.      Reason for visit: medication review    Allergies/ADRs: Reviewed in chart  Tobacco: She reports that she quit smoking about 25 years ago. Her smoking use included cigarettes. She has never used smokeless tobacco.  Alcohol: not currently using    Medication Adherence/Access: Patient uses pill box(es).  Her daughter helps with medication set-up.    Pneumonia/COVID:  Discharged on dexamethasone daily (finished yesterday) and pantoprazole 40 mg daily.  Seems to be getting stronger every day.  Appetite okay.  Sleeping okay.  Family expressed concerns with hearing loss since admission; patient did have difficulty hearing with phone visit today.  They have appointment with primary care provider to check for ear wax and will then go from there.    COPD: Current medications:  Proventil twice daily, albuterol Nebs as needed (three times daily), budesonide twice daily and ipratropium-albuterol nebs twice daily.    Sleeps with 2L oxygen as needed.  Pt is not experiencing side effects.    Pt reports the following symptoms: none at this time  Pt does have an COPD Action Plan on file.   Has spirometry been completed: Yes     CLL: Getting Immune Globulin infusions every 6 weeks - due 10/26.  No concerns at this time.  Followed by Oncology.    Hypertension: Current medications include metoprolol succinate 25 mg daily and furosemide 20 mg daily.  Patient does not self-monitor BP.  Patient reports no current medication side effects.  BP Readings from Last 3 Encounters:   09/25/21 139/43   07/21/21 102/68   07/07/21 130/59       Hyperlipidemia: Current therapy includes atorvastatin 20 mg once daily.  Pt reports no significant myalgias or other side effects.  The ASCVD Risk score (Frederic DE GUZMAN Jr., et al., 2013) failed to calculate for the following reasons:    The 2013 ASCVD risk score is only valid for ages 40 to 79    The patient has a prior MI or stroke diagnosis    Recent Labs   Lab Test 01/06/21  1130 05/01/19  1144 10/09/14  0350 10/09/14  0350   CHOL 155 161   < > 167   HDL 46* 45*   < > 55   LDL 89 84   < > 88   TRIG 102 160*   < > 121   CHOLHDLRATIO  --   --   --  3.0    < > = values in this interval not displayed.       Hypothyroidism: Patient is taking levothyroxine 75 mcg daily. Patient is having the following symptoms: hypothyroidism - always tired.   TSH   Date Value Ref Range Status   03/24/2021 0.81 0.40 - 4.00 mU/L Final       Anxiety:  Current medications include: sertraline 50 mg daily.  No concerns at this time.  LESLY-7 SCORE 12/19/2018 4/16/2020 1/6/2021   Total Score 12 0 2     Insomnia: Current medications include: trazodone 50 mg at bedtime. Pt reports no issues.  No side effects.    Allergic rhinitis: Current medications include fluticasone nasal spray 1 spray(s) as needed. Primary symptoms are runny nose every morning. Pt feels that current therapy is not effective.     Supplements:  Taking chewable MV twice daily and ferrous sulfate 325 mg daily.  Drinks milk and eat ice  cream.  Hemoglobin   Date Value Ref Range Status   09/23/2021 9.9 (L) 11.7 - 15.7 g/dL Final   07/07/2021 8.8 (L) 11.7 - 15.7 g/dL Final   ]      Today's Vitals: LMP  (LMP Unknown)   ----------------  Post Discharge Medication Reconciliation Status: discharge medications reconciled, continue medications without change.    I spent 27 minutes with this patient today (an extra 15 minutes was spent creating the Medication Action Plan). All changes were made via collaborative practice agreement with Nehal Freeman CNP. A copy of the visit note was provided to the patient's primary care provider.    The patient was sent via [employer] a summary of these recommendations.     Yuliana Cast , Pharm D  [phone] (phone)  Medication Therapy Management Pharmacist     Telemedicine Visit Details  Type of service:  Telephone visit  Start Time: 100pm  End Time: 1:27 PM  Originating Location (patient location): [place]  Distant Location (provider location):  [facility]     Medication Therapy Recommendations  No medication therapy recommendations to display

## 2021-10-01 ENCOUNTER — TELEPHONE (OUTPATIENT)
Dept: INTERNAL MEDICINE | Facility: CLINIC | Age: 86
End: 2021-10-01

## 2021-10-01 ENCOUNTER — PATIENT OUTREACH (OUTPATIENT)
Dept: NURSING | Facility: CLINIC | Age: 86
End: 2021-10-01
Payer: MEDICARE

## 2021-10-01 DIAGNOSIS — J18.9 COMMUNITY ACQUIRED PNEUMONIA, UNSPECIFIED LATERALITY: ICD-10-CM

## 2021-10-01 ASSESSMENT — ACTIVITIES OF DAILY LIVING (ADL): DEPENDENT_IADLS:: CLEANING;SHOPPING;TRANSPORTATION

## 2021-10-01 NOTE — LETTER
[facility]  Patient Centered Plan of Care  About Me:        Patient Name:  Marie Kline    YOB: 1932  Age:         89 year old   Camilo MRN:    [id number] Telephone Information:  Home Phone [phone]   Mobile [phone]       Address:  [address] [place] [address] [address]  [facility] [address] Email address:  [email or web address]      Emergency Contact(s)    Name Relationship Lgl Grd Work Phone Home Phone Mobile Phone   1. MARICARMEN CHANDLER* Daughter No  [phone]   2. EMILIA BUCHANAN Grandchild No  [phone]   3. IQRA, * Relative No   [phone]   4. JACQUESMUNIR* Grandchild No  [phone]   5. ROBIN KLINE Son    [phone]           Primary language:  English     needed? No   [place] [employer] [facility]:  [phone] op. 1  Other communication barriers: None  Preferred Method of Communication:  Mail  Current living arrangement: I live alone, I live in assisted living  Mobility Status/ Medical Equipment: Independent w/Device    Health Maintenance  Health Maintenance Reviewed: Due/Overdue   Health Maintenance Due   Topic Date Due     HF ACTION PLAN  Never done     ZOSTER IMMUNIZATION (1 of 2) Never done     COVID-19 Vaccine (3 - Moderna risk 3-dose series) 03/15/2021     INFLUENZA VACCINE (1) 09/01/2021       My Access Plan  Medical Emergency 911   Primary Clinic Line [facility] - [phone]   24 Hour Appointment Line [phone] or  [phone]) (toll-free)   24 Hour Nurse Line [phone] (toll-free)   Preferred Urgent Care [facility] [place], [phone]   [facility]  [phone]   Preferred Pharmacy [id number] - [place], [place] - [address] [facility]     Behavioral Health Crisis Line The National Suicide Prevention Lifeline at 1-352.268.5365 or 911     My Care Team Members  Patient Care Team       Relationship  Specialty Notifications Start End    Nehal Freeman CNP PCP - General Internal Medicine  5/19/21     Phone: [phone] Fax: [phone]         [address] [place] [address]    Isabel Ivory MD MD Oncology  2/6/17     Phone: [phone] Fax: [phone]         [place] [facility]  [address] [facility] [address]    Yuliana Cast, [facility] Pharmacist Pharmacist  4/16/19     Phone: [phone] Pager: [phone]         [address] [facility] [address] [facility] [address]    Richard Hanna Community Health Worker Primary Care - CC  7/30/19     [place]; Ph: [phone], Fax: [phone]    Rogelio Gaming [facility] Lead Care Coordinator   6/16/20     Kar Nuñez MD Assigned Surgical Provider   10/23/20     Phone: [phone] Fax: [phone] [address] [place] [address]    Marva Powell MD Assigned Pulmonology Provider   5/2/21     Phone: [phone] Fax: [phone]         [address] [facility] [address] [facility] [address]    Sadiq Stein DPM Assigned Musculoskeletal Provider   5/16/21     Phone: [phone] Fax: [phone]         [address] [facility] [address] [facility] [address]    Nehal Freeman CNP Assigned PCP   6/6/21     Phone: [phone] Fax: [phone]         [address] [facility] [address]    Kellen May [facility] Pharmacist Pharmacist Ambulatory Care  9/20/21     Phone: [phone] Fax: [phone]         [address] [facility] [address]            My Care Plans  Self Management and Treatment Plan  Goals and (Comments)  Goals        General     4. Medical (pt-stated)      Notes - Note edited  10/1/2021  9:22 AM by Rogelio Gaming LGSW     Goal Statement: I want my skin tears to heal within 15 months.  Date Goal set: 9.10.2020 (extended 8/31/2021, 10/1/2021)  Barriers: Medical conditions impacting fragility, many opportunities for injury.  Strengths: Strong support system, recognition of need,  coordination with PCP office to manage cares and needs.  Date to Achieve By: 12.10.2021  Patient expressed understanding of goal: Pt reports understanding and denies any additional questions or concerns at this times. JOSIAS CC engaged in AIDET communication during encounter.    Action steps to achieve this goal:  1. I will manage wound care until PCP appointment.  2. I will follow-up with primary care regarding plan of care.  3. I will follow wound care recommendations until healed.               Action Plans on File:   COPD     Advance Care Plans/Directives Type:   Type Advanced Care Plans/Directives: POLST    My Medical and Care Information  Problem List   Patient Active Problem List   Diagnosis     Acute and chronic respiratory failure with hypercapnia (HCC)     Nonischemic cardiomyopathy (H)     Pneumonia     Acute myocardial infarction, initial episode of care (HCC)     CLL (chronic lymphocytic leukemia) (HCC)     CHF (congestive heart failure) (HCC)     Cardiomyopathy in other diseases classified elsewhere (HCC)     Hyperlipidemia with target LDL less than 130     Health Care Home     COPD exacerbation (H)     LESLY (generalized anxiety disorder)     Hypothyroidism     Back pain with radiation     DDD (degenerative disc disease), lumbar     Splenomegaly     Lumbar degenerative disc disease     Lumbar foraminal stenosis     Central spinal stenosis     Bladder cancer (H)     Sepsis (H)     Senile osteoporosis     CKD (chronic kidney disease) stage 3, GFR 30-59 ml/min (H)     Purpura senilis (H)     Xerosis cutis     Malignant neoplasm of urinary bladder, unspecified site (H)     Hypoxia     Femoral neck fracture (H)     S/P total hip arthroplasty     NSTEMI (non-ST elevated myocardial infarction) (H)     Stress-induced cardiomyopathy     COPD with acute exacerbation (H)     Shoulder fracture     Mouth sores     Closed nondisplaced fracture of pelvis with routine healing, unspecified part of pelvis, subsequent  encounter     Pelvic hematoma in female     Closed head injury, subsequent encounter     Multiple skin tears     Pneumonia of left lung due to infectious organism, unspecified part of lung     Chest pain     Coronary artery disease involving native heart with angina pectoris, unspecified vessel or lesion type (H)     Acquired hypogammaglobulinemia (H)     Chronic diastolic (congestive) heart failure (H)     Insomnia     Contusion of scalp, subsequent encounter     Essential (primary) hypertension     Fatigue     History of falling     Hypokalemia     Hyponatremia     Muscle weakness (generalized)     Other abnormalities of gait and mobility     Other injury of unspecified body region, subsequent encounter     Other symbolic dysfunctions     Weakness of left leg     Pulmonary nodules     Community acquired pneumonia, unspecified laterality     2019 novel coronavirus disease (COVID-19)      Current Medications and Allergies:  See printed Medication Report.  Current Outpatient Medications   Medication Instructions     ACE/ARB/ARNI NOT PRESCRIBED (INTENTIONAL) Please choose reason not prescribed from choices below.     albuterol (PROVENTIL HFA) 108 (90 Base) MCG/ACT inhaler 2 puffs, Inhalation, EVERY 6 HOURS PRN     albuterol (PROVENTIL) 2.5 mg, Nebulization, EVERY 6 HOURS PRN     aspirin 81 mg, Oral, EVERY EVENING     atorvastatin (LIPITOR) 20 mg, Oral, DAILY     budesonide (PULMICORT) 0.5 mg, Nebulization, 2 TIMES DAILY     ferrous sulfate (FEROSUL) 325 mg, Oral, DAILY WITH BREAKFAST     fluticasone (FLONASE) 50 MCG/ACT nasal spray 1 spray, Both Nostrils, DAILY PRN     furosemide (LASIX) 20 mg, Oral, DAILY     Immune Globulin, Human, (OCTAGAM) 5 GM/100ML SOLN 300 mg/kg into the vein every 6 weeks.<BR><BR>Hold this medication while in TCU-resume at discharge from TCU     ipratropium - albuterol 0.5 mg/2.5 mg/3 mL (DUONEB) 0.5-2.5 (3) MG/3ML neb solution 1 vial, Nebulization, 3 TIMES DAILY     levothyroxine  (SYNTHROID/LEVOTHROID) 75 mcg, Oral, DAILY     loperamide (IMODIUM) 2 mg, Oral, DAILY PRN     metoprolol succinate ER (TOPROL-XL) 25 mg, Oral, DAILY     Multiple Vitamins-Minerals (MULTIVITAMIN ADULT) CHEW 2 chew tab, Oral, DAILY     pantoprazole (PROTONIX) 40 mg, Oral, EVERY MORNING BEFORE BREAKFAST     sertraline (ZOLOFT) 50 mg, Oral, DAILY     traZODone (DESYREL) 50 mg, Oral, AT BEDTIME        Allergies   Allergen Reactions     Diagnostic X-Ray Materials Hives     CT DYE     Contrast Dye Hives     CT DYE     Prolia [Denosumab]      Osteonecrosis jaw       Wound Dressing Adhesive        Care Coordination Start Date: 5/16/2019   Frequency of Care Coordination: monthly   Form Last Updated: 10/01/2021     YESSI Vela  Clinic Care Coordinator  Ph. [phone]  [email or web address]

## 2021-10-01 NOTE — TELEPHONE ENCOUNTER
Irish, physical therapy with [facility] calls to request home care orders:   PT: 2w3 and 1w1 for strength, mobility and balance  Home health aide: 1w4.     Verbal authorization given for home care orders per [facility] protocol.     Marcia Rg RN  [facility]

## 2021-10-01 NOTE — PROGRESS NOTES
Clinic Care Coordination Contact  [facility]: Post-Discharge Note  SITUATION                                                      Admission:    Admission Date: 09/20/21   Reason for Admission: Marie Kline is a 89-year-old female with a history of chronic respiratory failure oxygen dependent due to COPD, chronic lymphocytic leukemia with chronic leukocytosis, who presented to the hospital for worsening shortness of breath, fever, weakness in the setting of a recent diagnosis of COVID-19 pneumonia.  Discharge:   Discharge Date: 09/25/21  Discharge Diagnosis: Acute on chronic hypoxic respiratory failure secondary to COVID-19 pneumoniaCOPD exacerbationChronic lymphocytic leukemia with chronic leukocytosisDeconditioning and generalized weaknessHyponatremiaHypokalemia    BACKGROUND                                                      Patient is enrolled in Clinic Care Coordination with the goal of healing skin tears. Pt states that they are continuing to heal, but are not fully healed at this time. Pt states that she discharged home from the hospital and her son is staying with her to provide support. Pt's [facility] has restarted today.     ASSESSMENT      Enrollment  Primary Care Care Coordination Status: Enrolled  Clinical Pathway Name: COPD  Outreach Frequency: monthly    Discharge Assessment  How are you doing now that you are home?: Doing well.  How are your symptoms? (Red Flag symptoms escalate to triage hotline per guidelines): Improved  Do you feel your condition is stable enough to be safe at home until your provider visit?: Yes  Does the patient have their discharge instructions? : Yes  Does the patient have questions regarding their discharge instructions? : No  Do you have questions regarding any of your medications? : No (Appointment with MTM)  Discharge follow-up appointment scheduled within 14 calendar days? : Yes  Discharge Follow Up Appointment Date: 09/29/21  Discharge Follow Up  Appointment Scheduled with?: Primary Care Provider              Care Management       Care Mgmt General Assessment  Referral  Referral Source: IP Report  Health Care Home/Utilization  Preferred Hospital: [facility]  [phone]  Preferred Urgent Care: [facility] [place], [phone]  Living Situation  Current living arrangement:: I live alone;I live in assisted living  Type of residence:: Independent Senior Living  Resources  Patient receiving home care services:: No  Skilled Home Care Services: Skilled Nursing;Home Health Aid  Community Resources: None  Supplies used at home:: None  Equipment Currently Used at Home: walker, rolling  Referrals Placed: None  Employment Status: retired  Psychosocial  Rastafarian or spiritual beliefs that impact treatment:: No  Mental health DX:: No  Mental health management concern (GOAL):: No  Informal Support system:: Family  Functional Status  Dependent ADLs:: Independent  Dependent IADLs:: Cleaning;Shopping;Transportation  Bed or wheelchair confined:: No  Mobility Status: Independent w/Device  Advance Care Plan/Directive  Advanced Care Plans/Directives on file:: Yes  Type Advanced Care Plans/Directives: POLST  Advanced Care Plan/Directive Status: Not Applicable and     Care Mgmt Encounter Assessment    Clinic Utilization  Difficulty keeping appointments:: No  Compliance Concerns: No  No-Show Concerns: No  No PCP office visit in Past Year: No  Transportation  Transportation means:: Family  Finances  Financial/Insurance concerns (GOAL):: No  Primary Diagnosis  Primary Diagnosis: Orthopedic (left scalp contusion/hematoma; left shoulder fracture and skin tear.)  Barriers in Communication  Other concerns:: None  How confident are you filling out medical forms by yourself:: Quite a bit  Pain  Pain (GOAL):: No  Medication Review  Medication adherence problem (GOAL):: No  Knowledgeable about how to use meds:: Yes  Medication side effects suspected::  No  Diet/Exercise/Sleep  Diet:: Other (soft food; with boosts and high-fiber diet.)  Inadequate nutrition (GOAL):: No  Tube Feeding: No  Inadequate activity/exercise (GOAL):: No  Significant changes in sleep pattern (GOAL): No    PLAN                                                      Outpatient Plan:  Patient plans to follow-up with PCP to address hearing concerns. Audiology referral is pending based on assessment at PCP follow-up.     Future Appointments   Date Time Provider Department Center   10/6/2021  8:00 AM Chato Huang MD [facility]   10/19/2021 12:45 PM RH LAB DRAW 1 CIRS [place] [facility]   10/19/2021  1:20 PM RSCCCT1 RHSCCT RS[facility]   10/25/2021 11:30 AM Marva Powell MD [facility] RID   10/25/2021  5:40 PM Dirk Blue MD [facility]         For any urgent concerns, please contact our 24 hour nurse triage line: [phone] ([phone])         ELIESER Pichardo

## 2021-10-02 ENCOUNTER — HEALTH MAINTENANCE LETTER (OUTPATIENT)
Age: 86
End: 2021-10-02

## 2021-10-05 ENCOUNTER — TELEPHONE (OUTPATIENT)
Dept: INTERNAL MEDICINE | Facility: CLINIC | Age: 86
End: 2021-10-05

## 2021-10-05 NOTE — TELEPHONE ENCOUNTER
Fax received from [facility] - Revision to Plan of Care 10/01/21 for review and signature.  Put in Nehal Freeman's in basket.

## 2021-10-06 ENCOUNTER — OFFICE VISIT (OUTPATIENT)
Dept: INTERNAL MEDICINE | Facility: CLINIC | Age: 86
End: 2021-10-06
Payer: MEDICARE

## 2021-10-06 ENCOUNTER — MEDICAL CORRESPONDENCE (OUTPATIENT)
Dept: HEALTH INFORMATION MANAGEMENT | Facility: CLINIC | Age: 86
End: 2021-10-06

## 2021-10-06 VITALS
HEIGHT: 59 IN | SYSTOLIC BLOOD PRESSURE: 144 MMHG | RESPIRATION RATE: 13 BRPM | BODY MASS INDEX: 17.82 KG/M2 | HEART RATE: 84 BPM | OXYGEN SATURATION: 93 % | WEIGHT: 88.4 LBS | DIASTOLIC BLOOD PRESSURE: 86 MMHG

## 2021-10-06 DIAGNOSIS — H90.0 CONDUCTIVE HEARING LOSS, BILATERAL: Primary | ICD-10-CM

## 2021-10-06 PROCEDURE — 99213 OFFICE O/P EST LOW 20 MIN: CPT | Performed by: FAMILY MEDICINE

## 2021-10-06 ASSESSMENT — MIFFLIN-ST. JEOR: SCORE: 731.61

## 2021-10-06 NOTE — PROGRESS NOTES
"  CHIEF COMPLAINT    Problem hearing.      HISTORY    89-year-old woman had recent Covid pneumonia. Seems to have recovered fairly well from that. She is on 24 hour/day O2 supplement.    Patient is Covid vaccinated.    Her hearing has decreased. She wonders about wax buildup.    She is here with her son today.      REVIEW OF SYSTEMS    No fever.  No labored breathing.  No chest pain.  No edema.      EXAM  BP (!) 144/86   Pulse 84   Resp 13   Ht 1.499 m (4' 11\")   Wt 40.1 kg (88 lb 6.4 oz)   LMP  (LMP Unknown)   SpO2 93%   BMI 17.85 kg/m      Elderly woman in wheelchair. O2 running.  Right ear canal had just a small fragment of wax which was removed with a curette and irrigation.  No significant wax buildup on left side.  Both tympanic membranes show some scarring but are not inflamed and do not have obvious fluid buildup. Probably about normal for age.  Neck without masses.  Diminished breath sounds bilaterally with prolonged expiration.  No peripheral edema.      (H90.0) Conductive hearing loss, bilateral  (primary encounter diagnosis)  Comment:   Advised best to have ENT evaluation.  Probably hearing aid is only solution.  Plan: Otolaryngology Referral              "

## 2021-10-14 ENCOUNTER — TELEPHONE (OUTPATIENT)
Dept: INTERNAL MEDICINE | Facility: CLINIC | Age: 86
End: 2021-10-14

## 2021-10-14 NOTE — TELEPHONE ENCOUNTER
Fax received from [facility] 10/01/21 for review and signature.  Put in Nehal Freeman's in basket.

## 2021-10-18 ENCOUNTER — TELEPHONE (OUTPATIENT)
Dept: INTERNAL MEDICINE | Facility: CLINIC | Age: 86
End: 2021-10-18

## 2021-10-18 ENCOUNTER — TRANSFERRED RECORDS (OUTPATIENT)
Dept: HEALTH INFORMATION MANAGEMENT | Facility: CLINIC | Age: 86
End: 2021-10-18

## 2021-10-18 DIAGNOSIS — Z53.9 DIAGNOSIS NOT YET DEFINED: Primary | ICD-10-CM

## 2021-10-18 PROCEDURE — 99207 PR MD CERTIFICATION HHA PATIENT: CPT | Performed by: NURSE PRACTITIONER

## 2021-10-18 NOTE — TELEPHONE ENCOUNTER
SKILLED NURSING / PHYSICAL THERAPY / OCCUPATIONAL THERAPY / HHA order received via fax. Form in your mailbox to be signed.

## 2021-10-19 ENCOUNTER — HOSPITAL ENCOUNTER (OUTPATIENT)
Dept: CT IMAGING | Facility: CLINIC | Age: 86
Discharge: HOME | End: 2021-10-19
Attending: INTERNAL MEDICINE | Admitting: INTERNAL MEDICINE
Payer: MEDICARE

## 2021-10-19 ENCOUNTER — LAB (OUTPATIENT)
Dept: INFUSION THERAPY | Facility: CLINIC | Age: 86
End: 2021-10-19
Attending: NURSE PRACTITIONER
Payer: MEDICARE

## 2021-10-19 DIAGNOSIS — R59.0 MEDIASTINAL ADENOPATHY: ICD-10-CM

## 2021-10-19 PROCEDURE — 71250 CT THORAX DX C-: CPT | Mod: MG

## 2021-10-19 NOTE — PROGRESS NOTES
Infusion Nursing Note:  Marie Kline presents today for PIV start for scans.    Patient seen by provider today: No   present during visit today: Not Applicable.    Note: N/A.      Intravenous Access:  Lab draw site LAC, Needle type angiocath, Gauge 20.  Labs drawn without difficulty.  Peripheral IV placed.    Treatment Conditions:  Not Applicable.      Post Infusion Assessment:  Patient tolerated procedure without incident.  Site patent and intact, free from redness, edema or discomfort.  No evidence of extravasations.       Discharge Plan:   To radiology for imaging.      LUKE LARKIN RN

## 2021-10-25 ENCOUNTER — LAB (OUTPATIENT)
Dept: ONCOLOGY | Facility: CLINIC | Age: 86
End: 2021-10-25
Attending: INTERNAL MEDICINE
Payer: MEDICARE

## 2021-10-25 VITALS
RESPIRATION RATE: 18 BRPM | DIASTOLIC BLOOD PRESSURE: 56 MMHG | WEIGHT: 88 LBS | TEMPERATURE: 98.4 F | SYSTOLIC BLOOD PRESSURE: 128 MMHG | BODY MASS INDEX: 17.77 KG/M2 | OXYGEN SATURATION: 85 % | HEART RATE: 60 BPM

## 2021-10-25 DIAGNOSIS — J44.9 CHRONIC OBSTRUCTIVE PULMONARY DISEASE, UNSPECIFIED COPD TYPE (H): Primary | ICD-10-CM

## 2021-10-25 DIAGNOSIS — J96.11 CHRONIC RESPIRATORY FAILURE WITH HYPOXIA (H): ICD-10-CM

## 2021-10-25 PROCEDURE — 99215 OFFICE O/P EST HI 40 MIN: CPT | Performed by: INTERNAL MEDICINE

## 2021-10-25 PROCEDURE — 250N000011 HC RX IP 250 OP 636: Performed by: INTERNAL MEDICINE

## 2021-10-25 PROCEDURE — G0463 HOSPITAL OUTPT CLINIC VISIT: HCPCS | Mod: 25

## 2021-10-25 PROCEDURE — G0008 ADMIN INFLUENZA VIRUS VAC: HCPCS | Performed by: INTERNAL MEDICINE

## 2021-10-25 PROCEDURE — 90662 IIV NO PRSV INCREASED AG IM: CPT | Performed by: INTERNAL MEDICINE

## 2021-10-25 RX ORDER — ALBUTEROL SULFATE 90 UG/1
2 AEROSOL, METERED RESPIRATORY (INHALATION) EVERY 6 HOURS
Qty: 1 EACH | Refills: 11 | Status: SHIPPED | OUTPATIENT
Start: 2021-10-25 | End: 2022-01-01

## 2021-10-25 RX ADMIN — INFLUENZA A VIRUS A/VICTORIA/2570/2019 IVR-215 (H1N1) ANTIGEN (FORMALDEHYDE INACTIVATED), INFLUENZA A VIRUS A/TASMANIA/503/2020 IVR-221 (H3N2) ANTIGEN (FORMALDEHYDE INACTIVATED), INFLUENZA B VIRUS B/PHUKET/3073/2013 ANTIGEN (FORMALDEHYDE INACTIVATED), AND INFLUENZA B VIRUS B/WASHINGTON/02/2019 ANTIGEN (FORMALDEHYDE INACTIVATED) 0.7 ML: 60; 60; 60; 60 INJECTION, SUSPENSION INTRAMUSCULAR at 16:40

## 2021-10-25 ASSESSMENT — PAIN SCALES - GENERAL: PAINLEVEL: NO PAIN (0)

## 2021-10-25 NOTE — PATIENT INSTRUCTIONS
Next step is repeat oxygen testing  Then we will order new oxygen from [place]    Use saline nasal gel for bloody nose  You probably should have humidity on the oxygen.     MAINTENANCE / CONTROLLER MEDICATION: I am prescribing a medication called Trelegy  You need to use this every day even if your breathing feels good     Ventolin/Proair (albuterol)  is a RESCUE medication, to be used if needed for cough, wheezing or shortness of breath    Alternative medications:      Incruse  (umeclidinium), Take once daily using Ellipta .   Seebri  (glycopyrrolate), Take twice daily using Respimat    Spiriva  (tiotropium), Take once daily using Respmat  or Handihaler .   Tudorza  (aclidinium), Take twice daily using Pressair .     Anoro  (umeclidinium and vilanterol), Take once daily using Ellipta .   Stiolto  (olodaterol and tiotropium), Take once daily using Respimat .   Utibron  (indacaterol and glycopyrrolate), Take twice daily using Neohaler .   Bevespi  (glycopyrrolate and formoterol). Take twice daily using Aerosphere .     Advair  (fluticasone and salmeterol), Take twice daily using Discus  or MDI.   Breo  (fluticasone and vilanterol), Take once daily using Ellipta .   Symbicort  (budesonide and formoterol), Take once daily using MDI.

## 2021-10-25 NOTE — PROGRESS NOTES
"Oncology Rooming Note    October 25, 2021 3:44 PM   Marie Kline is a 89 year old female who presents for:    Chief Complaint   Patient presents with     Lung Nodule     Mediastinal adenopathy     Initial Vitals: /56   Pulse 60   Temp 98.4  F (36.9  C) (Tympanic)   Resp 18   Wt 39.9 kg (88 lb)   LMP  (LMP Unknown)   SpO2 (!) 85%   BMI 17.77 kg/m   Estimated body mass index is 17.77 kg/m  as calculated from the following:    Height as of 10/6/21: 1.499 m (4' 11\").    Weight as of this encounter: 39.9 kg (88 lb). Body surface area is 1.29 meters squared.  No Pain (0) Comment: Data Unavailable   No LMP recorded (lmp unknown). Patient has had a hysterectomy.  Allergies reviewed: Yes  Medications reviewed: Yes    Medications: Medication refills not needed today.  Pharmacy name entered into [employer]:    [id number] [place], [place] - [address] [facility] - [place], [place] - [address] [place] [address]      Holly Adams CMA              "

## 2021-10-25 NOTE — PROGRESS NOTES
[facility] Follow Up Visit    Reason for Visit  Marie Kline is a 89 year old female who is seen in follow up for abnormal chest CT   Pulmonary HPI  - She was hospitalized in September with COVID, hearing issues. She has been on and off oxygen at home for seven years, using [employer]. They are hoping to get a new portable oxygen concentrator and new oxygen supplier.    Last year her inhaler assistance program was discontinued. She is currently doing albuterol and ipratropium nebs 2-3 times daily. She would prefer an inhaler if an affordable one could be identified.     Here today with her daughter and son-in-law.     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  The patient was seen and examined by Marva Powell MD   Current Outpatient Medications   Medication     ACE/ARB/ARNI NOT PRESCRIBED (INTENTIONAL)     albuterol (PROVENTIL HFA) 108 (90 Base) MCG/ACT inhaler     albuterol (PROVENTIL) (2.5 MG/3ML) 0.083% neb solution     aspirin 81 MG EC tablet     budesonide (PULMICORT) 0.5 MG/2ML neb solution     ferrous sulfate (IRON) 325 (65 FE) MG tablet     fluticasone (FLONASE) 50 MCG/ACT nasal spray     furosemide (LASIX) 20 MG tablet     Immune Globulin, Human, (OCTAGAM) 5 GM/100ML SOLN     ipratropium - albuterol 0.5 mg/2.5 mg/3 mL (DUONEB) 0.5-2.5 (3) MG/3ML neb solution     levothyroxine (SYNTHROID/LEVOTHROID) 75 MCG tablet     loperamide (IMODIUM) 2 MG capsule     metoprolol succinate ER (TOPROL-XL) 25 MG 24 hr tablet     Multiple Vitamins-Minerals (MULTIVITAMIN ADULT) CHEW     pantoprazole (PROTONIX) 40 MG EC tablet     sertraline (ZOLOFT) 50 MG tablet     traZODone  (DESYREL) 50 MG tablet     No current facility-administered medications for this visit.     Allergies   Allergen Reactions     Diagnostic X-Ray Materials Hives     CT DYE     Contrast Dye Hives     CT DYE     Prolia [Denosumab]      Osteonecrosis jaw       Wound Dressing Adhesive      Social History     Socioeconomic History     Marital status:      Spouse name: Not on file     Number of children: Not on file     Years of education: Not on file     Highest education level: Not on file   Occupational History     Not on file   Tobacco Use     Smoking status: Former Smoker     Types: Cigarettes     Quit date: 2/3/1996     Years since quittin.7     Smokeless tobacco: Never Used   Vaping Use     Vaping Use: Never used   Substance and Sexual Activity     Alcohol use: No     Alcohol/week: 0.0 standard drinks     Drug use: No     Sexual activity: Not Currently     Partners: Male   Other Topics Concern      Service Not Asked     Blood Transfusions Not Asked     Caffeine Concern No     Comment: 1/2-2 cups daily     Occupational Exposure Not Asked     Hobby Hazards Not Asked     Sleep Concern Not Asked     Stress Concern Not Asked     Weight Concern Not Asked     Special Diet Yes     Comment: no added salt, no dairy, more gatorade     Back Care Not Asked     Exercise No     Comment: walking her dog 5 times per day - none recently     Bike Helmet Not Asked     Seat Belt Not Asked     Self-Exams Not Asked     Parent/sibling w/ CABG, MI or angioplasty before 65F 55M? No   Social History Narrative     Not on file     Social Determinants of Health     Financial Resource Strain: Low Risk      Difficulty of Paying Living Expenses: Not hard at all   Food Insecurity: No Food Insecurity     Worried About Running Out of Food in the Last Year: Never true     Ran Out of Food in the Last Year: Never true   Transportation Needs: No Transportation Needs     Lack of Transportation (Medical): No     Lack of Transportation  (Non-Medical): No   Physical Activity:      Days of Exercise per Week:      Minutes of Exercise per Session:    Stress:      Feeling of Stress :    Social Connections:      Frequency of Communication with Friends and Family:      Frequency of Social Gatherings with Friends and Family:      Attends Shinto Services:      Active Member of Clubs or Organizations:      Attends Club or Organization Meetings:      Marital Status:    Intimate Partner Violence: Not At Risk     Fear of Current or Ex-Partner: No     Emotionally Abused: No     Physically Abused: No     Sexually Abused: No     Past Medical History:   Diagnosis Date     Arthritis     Generalized     Bladder cancer (H)      Bladder cancer (H)      Cardiomyopathy (H)      CLL (chronic lymphocytic leukemia) (H)      Congestive heart failure (H) 10/24/2014    flash pulm edema ass w/Takotsubo     COPD (chronic obstructive pulmonary disease) (H)     noc O2     Hypertension      Hypothyroid      Mumps      Myocardial infarction (H) 10/2014    Takotsubo presentation w/mild CAD     Pulmonary edema cardiac cause (H) 10/08/2014    associated w/Takotsubo ACS     Tricuspid valve disorders, 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    TR 2-3+ per echo     Past Surgical History:   Procedure Laterality Date     BIOPSY LYMPH NODE INGUINAL Right 10/23/2018    Procedure: excsional biopsy right inguinal lymph node;  Surgeon: Reji Connors MD;  Location: [facility] OR     BLADDER SURGERY       CORONARY ANGIOGRAPHY ADULT ORDER  10/2014    minimal CAD     CV LEFT HEART CATH N/A 12/11/2018    Procedure: Left Heart Cath;  Surgeon: Sadiq Carrasco MD;  Location:  HEART CARDIAC CATH LAB     CYSTOSCOPY       CYSTOSCOPY, BIOPSY BLADDER, COMBINED N/A 2/9/2016    Procedure: COMBINED CYSTOSCOPY, BIOPSY BLADDER;  Surgeon: Kar Nuñez MD;  Location:  OR     CYSTOSCOPY, TRANSURETHRAL RESECTION (TUR) TUMOR BLADDER, COMBINED N/A 2/9/2016    Procedure: COMBINED CYSTOSCOPY, TRANSURETHRAL  RESECTION (TUR) TUMOR BLADDER;  Surgeon: Kar Nuñez MD;  Location: [facility] OR     ESOPHAGOSCOPY, GASTROSCOPY, DUODENOSCOPY (EGD), COMBINED N/A 6/22/2016    Procedure: COMBINED ESOPHAGOSCOPY, GASTROSCOPY, DUODENOSCOPY (EGD);  Surgeon: Freddie Diez MD;  Location: [facility] GI     OPEN REDUCTION INTERNAL FIXATION HIP BIPOLAR Left 11/19/2018    Procedure: Left bipolar hemiarthroplasty;  Surgeon: Scar Reynolds MD;  Location: [facility] OR     Family History   Problem Relation Age of Onset     Cerebrovascular Disease Mother      Cancer Father        Exam:   LMP  (LMP Unknown)   GENERAL APPEARANCE: thin female, alert, and in no apparent distress.  PSYCH: mentation appears normal. and affect normal/bright  Results:  - My interpretation of the images relevant for this visit includes: CT images 10/19/21 reviewed, stable RUL linear opacity back to at least 2018; Bronchial wall thickening and scattered mucus plugging in the left lower lobe; mildly enlarged right paratracheal lymph node  - My interpretation of the PFT's relevant for this visit includes: nonspecific defect, reduced FEV1 with normal ratio 48% of predicted Feb 2020     Assessment and plan: Marie is an 88 yo female with CLL recently hospitalized with pneumonia, follow up for abnormal chest imaging  Lung nodule - seen on chest CT, while hospitalized with pneumonia  COPD - severe with chronic bronchitis   Currently on short-acting bronchodilators nebulized. I think she has indication for inhaled corticosteroid, long-acting beta agonist, long-acting muscarinic agent. I am ordering Trelegy but have provided some alternatives to achieve triple inhaled therapy.   Chronic hypoxic respiratory failure.    Interested in new POC, needs to recertify since it's been a while and she has recently had COVID infection.    I spent 45 minutes interviewing the patient, documenting.

## 2021-10-25 NOTE — LETTER
10/25/2021         RE: Marie Kline  [address]  [facility] [address]        Dear Colleague,    Thank you for referring your patient, Marie Kline, to the [facility]. Please see a copy of my visit note below.    [facility] Follow Up Visit    Reason for Visit  Marie Kline is a 89 year old female who is seen in follow up for abnormal chest CT   Pulmonary HPI  - She was hospitalized in September with COVID, hearing issues. She has been on and off oxygen at home for seven years, using [employer]. They are hoping to get a new portable oxygen concentrator and new oxygen supplier.    Last year her inhaler assistance program was discontinued. She is currently doing albuterol and ipratropium nebs 2-3 times daily. She would prefer an inhaler if an affordable one could be identified.     Here today with her daughter and son-in-law.     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  The patient was seen and examined by Marva Powell MD   Current Outpatient Medications   Medication     ACE/ARB/ARNI NOT PRESCRIBED (INTENTIONAL)     albuterol (PROVENTIL HFA) 108 (90 Base) MCG/ACT inhaler     albuterol (PROVENTIL) (2.5 MG/3ML) 0.083% neb solution     aspirin 81 MG EC tablet     budesonide (PULMICORT) 0.5 MG/2ML neb solution     ferrous sulfate (IRON) 325 (65 FE) MG tablet     fluticasone (FLONASE) 50 MCG/ACT nasal spray     furosemide (LASIX) 20 MG tablet     Immune Globulin, Human, (OCTAGAM) 5 GM/100ML SOLN     ipratropium - albuterol 0.5 mg/2.5 mg/3 mL (DUONEB) 0.5-2.5 (3) MG/3ML neb solution     levothyroxine  (SYNTHROID/LEVOTHROID) 75 MCG tablet     loperamide (IMODIUM) 2 MG capsule     metoprolol succinate ER (TOPROL-XL) 25 MG 24 hr tablet     Multiple Vitamins-Minerals (MULTIVITAMIN ADULT) CHEW     pantoprazole (PROTONIX) 40 MG EC tablet     sertraline (ZOLOFT) 50 MG tablet     traZODone (DESYREL) 50 MG tablet     No current facility-administered medications for this visit.     Allergies   Allergen Reactions     Diagnostic X-Ray Materials Hives     CT DYE     Contrast Dye Hives     CT DYE     Prolia [Denosumab]      Osteonecrosis jaw       Wound Dressing Adhesive      Social History     Socioeconomic History     Marital status:      Spouse name: Not on file     Number of children: Not on file     Years of education: Not on file     Highest education level: Not on file   Occupational History     Not on file   Tobacco Use     Smoking status: Former Smoker     Types: Cigarettes     Quit date: 2/3/1996     Years since quittin.7     Smokeless tobacco: Never Used   Vaping Use     Vaping Use: Never used   Substance and Sexual Activity     Alcohol use: No     Alcohol/week: 0.0 standard drinks     Drug use: No     Sexual activity: Not Currently     Partners: Male   Other Topics Concern      Service Not Asked     Blood Transfusions Not Asked     Caffeine Concern No     Comment: 1/2-2 cups daily     Occupational Exposure Not Asked     Hobby Hazards Not Asked     Sleep Concern Not Asked     Stress Concern Not Asked     Weight Concern Not Asked     Special Diet Yes     Comment: no added salt, no dairy, more gatorade     Back Care Not Asked     Exercise No     Comment: walking her dog 5 times per day - none recently     Bike Helmet Not Asked     Seat Belt Not Asked     Self-Exams Not Asked     Parent/sibling w/ CABG, MI or angioplasty before 65F 55M? No   Social History Narrative     Not on file     Social Determinants of Health     Financial Resource Strain: Low Risk      Difficulty of Paying Living Expenses:  Not hard at all   Food Insecurity: No Food Insecurity     Worried About Running Out of Food in the Last Year: Never true     Ran Out of Food in the Last Year: Never true   Transportation Needs: No Transportation Needs     Lack of Transportation (Medical): No     Lack of Transportation (Non-Medical): No   Physical Activity:      Days of Exercise per Week:      Minutes of Exercise per Session:    Stress:      Feeling of Stress :    Social Connections:      Frequency of Communication with Friends and Family:      Frequency of Social Gatherings with Friends and Family:      Attends [employer] Services:      Active Member of Clubs or Organizations:      Attends Club or Organization Meetings:      Marital Status:    Intimate Partner Violence: Not At Risk     Fear of Current or Ex-Partner: No     Emotionally Abused: No     Physically Abused: No     Sexually Abused: No     Past Medical History:   Diagnosis Date     Arthritis     Generalized     Bladder cancer (H)      Bladder cancer (H)      Cardiomyopathy (H)      CLL (chronic lymphocytic leukemia) (H)      Congestive heart failure (H) 10/24/2014    flash pulm edema ass w/Takotsubo     COPD (chronic obstructive pulmonary disease) (H)     noc O2     Hypertension      Hypothyroid      Mumps      Myocardial infarction (H) 10/2014    Takotsubo presentation w/mild CAD     Pulmonary edema cardiac cause (H) 10/08/2014    associated w/Takotsubo ACS     Tricuspid valve disorders, 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    TR 2-3+ per echo     Past Surgical History:   Procedure Laterality Date     BIOPSY LYMPH NODE INGUINAL Right 10/23/2018    Procedure: excsional biopsy right inguinal lymph node;  Surgeon: Reji Connors MD;  Location: [facility] OR     BLADDER SURGERY       CORONARY ANGIOGRAPHY ADULT ORDER  10/2014    minimal CAD     CV LEFT HEART CATH N/A 12/11/2018    Procedure: Left Heart Cath;  Surgeon: Sadiq Carrasco MD;  Location: [facility]     CYSTOSCOPY        CYSTOSCOPY, BIOPSY BLADDER, COMBINED N/A 2/9/2016    Procedure: COMBINED CYSTOSCOPY, BIOPSY BLADDER;  Surgeon: Kar Nuñez MD;  Location: [facility] OR     CYSTOSCOPY, TRANSURETHRAL RESECTION (TUR) TUMOR BLADDER, COMBINED N/A 2/9/2016    Procedure: COMBINED CYSTOSCOPY, TRANSURETHRAL RESECTION (TUR) TUMOR BLADDER;  Surgeon: Kar Nuñez MD;  Location: [facility] OR     ESOPHAGOSCOPY, GASTROSCOPY, DUODENOSCOPY (EGD), COMBINED N/A 6/22/2016    Procedure: COMBINED ESOPHAGOSCOPY, GASTROSCOPY, DUODENOSCOPY (EGD);  Surgeon: Freddie Diez MD;  Location: [facility] GI     OPEN REDUCTION INTERNAL FIXATION HIP BIPOLAR Left 11/19/2018    Procedure: Left bipolar hemiarthroplasty;  Surgeon: Scar Reynolds MD;  Location: [facility] OR     Family History   Problem Relation Age of Onset     Cerebrovascular Disease Mother      Cancer Father        Exam:   LMP  (LMP Unknown)   GENERAL APPEARANCE: thin female, alert, and in no apparent distress.  PSYCH: mentation appears normal. and affect normal/bright  Results:  - My interpretation of the images relevant for this visit includes: CT images 10/19/21 reviewed, stable RUL linear opacity back to at least 2018; Bronchial wall thickening and scattered mucus plugging in the left lower lobe; mildly enlarged right paratracheal lymph node  - My interpretation of the PFT's relevant for this visit includes: nonspecific defect, reduced FEV1 with normal ratio 48% of predicted Feb 2020     Assessment and plan: Marie is an 89 yo female with CLL recently hospitalized with pneumonia, follow up for abnormal chest imaging  Lung nodule - seen on chest CT, while hospitalized with pneumonia  COPD - severe with chronic bronchitis   Currently on short-acting bronchodilators nebulized. I think she has indication for inhaled corticosteroid, long-acting beta agonist, long-acting muscarinic agent. I am ordering Trelegy but have provided some alternatives to achieve triple inhaled therapy.   Chronic hypoxic  "respiratory failure.    Interested in new POC, needs to recertify since it's been a while and she has recently had COVID infection.    I spent 45 minutes interviewing the patient, documenting.       Oncology Rooming Note    October 25, 2021 3:44 PM   Marie Kline is a 89 year old female who presents for:    Chief Complaint   Patient presents with     Lung Nodule     Mediastinal adenopathy     Initial Vitals: /56   Pulse 60   Temp 98.4  F (36.9  C) (Tympanic)   Resp 18   Wt 39.9 kg (88 lb)   LMP  (LMP Unknown)   SpO2 (!) 85%   BMI 17.77 kg/m   Estimated body mass index is 17.77 kg/m  as calculated from the following:    Height as of 10/6/21: 1.499 m (4' 11\").    Weight as of this encounter: 39.9 kg (88 lb). Body surface area is 1.29 meters squared.  No Pain (0) Comment: Data Unavailable   No LMP recorded (lmp unknown). Patient has had a hysterectomy.  Allergies reviewed: Yes  Medications reviewed: Yes    Medications: Medication refills not needed today.  Pharmacy name entered into [facility]:    [id number] [place] [address] - [address] [facility] - [place], [place] - [address] [place] [address]      [facility]                  Again, thank you for allowing me to participate in the care of your patient.        Sincerely,        Marva Powell MD    "

## 2021-10-26 ENCOUNTER — MEDICAL CORRESPONDENCE (OUTPATIENT)
Dept: HEALTH INFORMATION MANAGEMENT | Facility: CLINIC | Age: 86
End: 2021-10-26
Payer: MEDICARE

## 2021-10-27 ENCOUNTER — TELEPHONE (OUTPATIENT)
Dept: INTERNAL MEDICINE | Facility: CLINIC | Age: 86
End: 2021-10-27

## 2021-10-27 NOTE — TELEPHONE ENCOUNTER
Home care nurse, Marcia, calls for order for discontinuation of home health aid; however, Marcia then noticed that the pt's orders will  by the end of the week and will not need an order for the discontinuation.   Sanjana VILLANUEVA RN   [facility]

## 2021-11-01 ENCOUNTER — TRANSFERRED RECORDS (OUTPATIENT)
Dept: HEALTH INFORMATION MANAGEMENT | Facility: CLINIC | Age: 86
End: 2021-11-01

## 2021-11-01 ENCOUNTER — TELEPHONE (OUTPATIENT)
Dept: INTERNAL MEDICINE | Facility: CLINIC | Age: 86
End: 2021-11-01
Payer: MEDICARE

## 2021-11-01 ENCOUNTER — MEDICAL CORRESPONDENCE (OUTPATIENT)
Dept: HEALTH INFORMATION MANAGEMENT | Facility: CLINIC | Age: 86
End: 2021-11-01
Payer: MEDICARE

## 2021-11-01 ENCOUNTER — TELEPHONE (OUTPATIENT)
Dept: INTERNAL MEDICINE | Facility: CLINIC | Age: 86
End: 2021-11-01

## 2021-11-01 NOTE — TELEPHONE ENCOUNTER
Fax received from [facility] - Transfer/Discharge Summary 10/28/21 for review and signature.  Put in Nehal Freeman's in basket.

## 2021-11-10 ENCOUNTER — PATIENT OUTREACH (OUTPATIENT)
Dept: CARE COORDINATION | Facility: CLINIC | Age: 86
End: 2021-11-10
Payer: MEDICARE

## 2021-11-10 DIAGNOSIS — I24.9 ACS (ACUTE CORONARY SYNDROME) (H): ICD-10-CM

## 2021-11-10 DIAGNOSIS — E78.5 HYPERLIPIDEMIA WITH TARGET LDL LESS THAN 130: ICD-10-CM

## 2021-11-10 NOTE — PROGRESS NOTES
Clinic Care Coordination Contact  [facility]/Voicemail       Clinical Data: Care Coordinator Outreach  Outreach attempted x 1.  Left message on patient's voicemail with call back information and requested return call.    Plan:  Care Coordinator will try to reach patient again in 10 business days.    KOREY Lawernce  Clinic Care Coordination  [facility] : [place], [place], and [place]  Phone: [phone]    ______________________  Next Outreach:  11/24/21  Planned Outreach Frequency: Monthly  Preferred Phone Number: [phone]    Last Assessment Date: 05/16/19  Care Plan Completion Date: 10/01/21  ______________________

## 2021-11-11 RX ORDER — ATORVASTATIN CALCIUM 20 MG/1
TABLET, FILM COATED ORAL
Qty: 90 TABLET | Refills: 0 | OUTPATIENT
Start: 2021-11-11

## 2021-11-11 NOTE — TELEPHONE ENCOUNTER
Refused Prescriptions:                       Disp   Refills    atorvastatin (LIPITOR) 20 MG tablet [Pharm*90 tab*0        Sig: TAKE ONE TABLET BY MOUTH ONE TIME DAILY       Medication no longer on active medication list. Per Dr. Huang, medication was discontinued on 10/6/2021.     Sanjana VILLANUEVA RN   [facility]

## 2021-11-26 NOTE — PROGRESS NOTES
Clinic Care Coordination Contact  [facility]/Voicemail       Clinical Data: Care Coordinator Outreach  Outreach attempted x 2.  Left message on patient's voicemail with call back information and requested return call.  Plan:  Care Coordinator will try to reach patient again in 10 business days.    KIM Encinas. Public Health  Community Health Worker  [facility]  Clinic Care Coordination  [phone]  ----------------------------------------------  Preferred Phone Number: [phone]  Next outreach date: 12-

## 2021-12-01 ENCOUNTER — PATIENT OUTREACH (OUTPATIENT)
Dept: NURSING | Facility: CLINIC | Age: 86
End: 2021-12-01
Payer: MEDICARE

## 2021-12-01 DIAGNOSIS — J44.9 CHRONIC OBSTRUCTIVE PULMONARY DISEASE, UNSPECIFIED COPD TYPE (H): Primary | ICD-10-CM

## 2021-12-01 NOTE — PROGRESS NOTES
Clinic Care Coordination Contact    Community Health Worker Follow Up    Care Gaps:     Health Maintenance Due   Topic Date Due     HF ACTION PLAN  Never done     MICROALBUMIN  Never done     ZOSTER IMMUNIZATION (1 of 2) Never done     COVID-19 Vaccine (3 - Moderna risk 4-dose series) 03/15/2021       Patient was focused on addressing current goals     Goals:   Goals Addressed as of 12/1/2021 at 12:21 PM                    Today    10/1/21       4. Medical (pt-stated)   90%  90%    Added 9/10/20 by Rogelio Gaming, [facility]      Goal Statement: I want my skin tears to heal within 15 months.  Date Goal set: 9.10.2020 (extended 8/31/2021, 10/1/2021)  Barriers: Medical conditions impacting fragility, many opportunities for injury.  Strengths: Strong support system, recognition of need, coordination with PCP office to manage cares and needs.  Date to Achieve By: 12.10.2021  Patient expressed understanding of goal: Pt reports understanding and denies any additional questions or concerns at this times. SW CC engaged in [employer] communication during encounter.    Action steps to achieve this goal:  1. I will manage wound care until PCP appointment.  2. I will follow-up with primary care regarding plan of care.  3. I will follow wound care recommendations until healed.      12-1, CHW    Patient is no longer receiving home care from [facility]    Patient is doing self wound care. Patient expressed that they are managing, however their legs are peeling and sometimes discolored    CHW encouraged the patient to make a PCP appointment to manage their health and check up on her legs    Patient wants to wait until the beginning of the new year to make their PCP appointment because they have a lot of other appointments (hearing, eye care, etc.).             Intervention and Education during outreach: CHW introduced self as the new CHW. CHW inquired if there was any question or concerns the patient has at this time. The patient did  express that one of their prescriptions got cancelled. CHW informed patient that it was a temporary prescription because they did not need a follow-up appointment to continue it. CHW will route the chart to lead CC to see if patient could be referred to Community Paramedics for a wound/leg check.     CHW Plan: CHW will follow-up with the patient in 3 weeks    FERNY Encinas Public Health  Community Health Worker  [facility] [place] [facility]  Clinic Care Coordination  [phone]  ----------------------------------------------------  Preferred Phone Number: [phone]  Next outreach: 12-

## 2021-12-01 NOTE — TELEPHONE ENCOUNTER
Patient is eligible for the Community Paramedic if Pt is agreeable to this service.    YESSI Vela  Clinic Care Coordinator  Ph. [phone]  [email or web address]

## 2021-12-03 ENCOUNTER — TRANSFERRED RECORDS (OUTPATIENT)
Dept: HEALTH INFORMATION MANAGEMENT | Facility: CLINIC | Age: 86
End: 2021-12-03
Payer: MEDICARE

## 2021-12-06 ENCOUNTER — MEDICAL CORRESPONDENCE (OUTPATIENT)
Dept: HEALTH INFORMATION MANAGEMENT | Facility: CLINIC | Age: 86
End: 2021-12-06
Payer: MEDICARE

## 2021-12-07 ENCOUNTER — PATIENT OUTREACH (OUTPATIENT)
Dept: CARE COORDINATION | Facility: CLINIC | Age: 86
End: 2021-12-07
Payer: MEDICARE

## 2021-12-07 ENCOUNTER — VIRTUAL VISIT (OUTPATIENT)
Dept: PHARMACY | Facility: CLINIC | Age: 86
End: 2021-12-07
Payer: COMMERCIAL

## 2021-12-07 DIAGNOSIS — I10 ESSENTIAL HYPERTENSION WITH GOAL BLOOD PRESSURE LESS THAN 140/90: ICD-10-CM

## 2021-12-07 DIAGNOSIS — F51.01 PRIMARY INSOMNIA: ICD-10-CM

## 2021-12-07 DIAGNOSIS — C91.10 CLL (CHRONIC LYMPHOCYTIC LEUKEMIA) (H): ICD-10-CM

## 2021-12-07 DIAGNOSIS — J44.1 COPD WITH ACUTE EXACERBATION (H): Primary | ICD-10-CM

## 2021-12-07 PROCEDURE — 99607 MTMS BY PHARM ADDL 15 MIN: CPT | Performed by: PHARMACIST

## 2021-12-07 PROCEDURE — 99606 MTMS BY PHARM EST 15 MIN: CPT | Performed by: PHARMACIST

## 2021-12-07 ASSESSMENT — ACTIVITIES OF DAILY LIVING (ADL): DEPENDENT_IADLS:: CLEANING;SHOPPING;TRANSPORTATION

## 2021-12-07 NOTE — PATIENT INSTRUCTIONS
Recommendations from today's MTM visit:                                                       1.  Trelegy inhaler was prescribed by Marva in October.  This would replace your nebulizers and be more convenient - check to see if covered at [employer]      Follow-up: Return in about 3 months (around 3/7/2022) for Medication Therapy Management.    It was great to speak with you today.  I value your experience and would be very thankful for your time with providing feedback on our clinic survey. You may receive a survey via email or text message in the next few days.     To schedule another MTM appointment, please call the clinic directly or you may call the MTM scheduling line at [phone] or toll-free at [phone].     My Clinical Pharmacist's contact information:                                                      Please feel free to contact me with any questions or concerns you have.      Yuliana Cast , Pharm D  [phone] (phone)  Medication Therapy Management Pharmacist

## 2021-12-07 NOTE — PROGRESS NOTES
Medication Therapy Management (MTM) Encounter    ASSESSMENT:                            Medication Adherence/Access: See below for considerations    Pneumonia/COVID:  stable    COPD: patient will follow-up with pharmacy on Trelegy prescription - possibly not covered so that is why she never started.  She will contact the clinic if she needs any help with PA, etc.    CLL: stable    Hypertension: stable    Insomnia: stable     PLAN:                            1.  Trelegy - check to see if covered at [facility]    Follow-up: Return in about 3 months (around 3/7/2022) for Medication Therapy Management.      SUBJECTIVE/OBJECTIVE:                          Marie Kline is a 89 year old female called for a follow-up visit. She was referred to me from Nehal Freemna.  Today's visit is a follow-up MTM visit from 9/30/21.     Reason for visit: medication review    Allergies/ADRs: Reviewed in chart  Tobacco: She reports that she quit smoking about 25 years ago. Her smoking use included cigarettes. She has never used smokeless tobacco.  Alcohol: not currently using    Medication Adherence/Access: no issues reported    Pneumonia/COVID:  Discharged in Sept on dexamethasone daily (finished yesterday) and pantoprazole 40 mg daily (finished 30-day course).   Hearing slowly improving but saw hearing provider yesterday and getting hearing aids.    Reports being tired all the time.  Sleeping well at night; almost 12 hours last night.    COPD: Current medications:  Proventil as needed, budesonide twice daily and ipratropium-albuterol nebs three daily.  Trelegy was ordered on 10/25 but patient never picked it up.  Wears oxygen 2L 24hrs.  Pt is not experiencing side effects.   Pt reports the following symptoms: none at this time  Pt does have an COPD Action Plan on file.   Has spirometry been completed: Yes   Followed by Marva Powell.    CLL: Getting Immune Globulin infusions every 6 weeks.  No concerns at this time.  Followed by  Oncology.    Hypertension: Current medications include metoprolol succinate 25 mg daily and furosemide 20 mg daily.  Patient does not self-monitor BP.  Patient reports no current medication side effects.  BP Readings from Last 3 Encounters:   10/25/21 128/56   10/06/21 (!) 144/86   09/25/21 139/43       Insomnia: Current medications include: trazodone 50 mg at bedtime. Pt reports no issues. Sleeping well at night.  No side effects.        Today's Vitals: LMP  (LMP Unknown)   ----------------      I spent 20 minutes with this patient today. All changes were made via collaborative practice agreement with Nehal Freeman CNP. A copy of the visit note was provided to the patient's primary care provider.    The patient was sent via [employer] a summary of these recommendations.     Yuliana Cast , Pharm D  [phone] (phone)  Medication Therapy Management Pharmacist     Telemedicine Visit Details  Type of service:  Telephone visit  Start Time: 100pm  End Time: 1:30 PM  Originating Location (patient location): [place]  Distant Location (provider location):  [facility]     Medication Therapy Recommendations  COPD exacerbation (H)    Current Medication: Fluticasone-Umeclidin-Vilanterol (TRELEGY ELLIPTA) 200-62.5-25 MCG/INH oral inhaler   Rationale: Does not understand instructions - Adherence - Adherence   Recommendation: Provide Adherence Intervention   Status: Patient Agreed - Adherence/Education

## 2021-12-08 ENCOUNTER — PATIENT OUTREACH (OUTPATIENT)
Dept: ONCOLOGY | Facility: CLINIC | Age: 86
End: 2021-12-08
Payer: MEDICARE

## 2021-12-08 DIAGNOSIS — J44.9 CHRONIC OBSTRUCTIVE PULMONARY DISEASE, UNSPECIFIED COPD TYPE (H): Primary | ICD-10-CM

## 2021-12-08 NOTE — PROGRESS NOTES
Marie called in to clinic reporting her Trelegy inhaler that Dr. Powell prescribed would cost her $400.     She is requesting an alternate inhaler be prescribed to her [facility] pharmacy in [place].    Marie reports she is currently using her Duoneb three times a day and her albuterol about twice a day.     Writer will update Dr. Powell and call Marie back.     Ksenia Silver, RN, BSN, ABDIAS  RN Care Coordinator  [facility]  [phone]

## 2021-12-09 RX ORDER — TIOTROPIUM BROMIDE 18 UG/1
18 CAPSULE ORAL; RESPIRATORY (INHALATION) DAILY
Qty: 1 CAPSULE | Refills: 3 | Status: SHIPPED | OUTPATIENT
Start: 2021-12-09 | End: 2022-04-11

## 2021-12-09 NOTE — PROGRESS NOTES
I recommend one medication from each class of long acting inhalers:  1) long-acting muscarinic agent  2) long-acting beta agonist   3) inhaled corticosteroid     Stephy include:   Incruse  (umeclidinium), Take once daily using Ellipta .   Seebri  (glycopyrrolate), Take twice daily using Respimat    Spiriva  (tiotropium), Take once daily using Respmat  or Handihaler .   Tudorza  (aclidinium), Take twice daily using Pressair .    LABAs include:   Arcapta  (indacaterol), Take once daily using Ellipta   Brovana  (arformoterol), Take twice daily using nebulizer.   Perforomist  (formoterol), Take twice daily using nebulizer.   Serevent  (salmeterol), Take twice daily using Discus  or MDI.   Stiverdi  (olodaterol), Take once daily using Respimat .    LAMA & LABA combinations include:   Anoro  (umeclidinium and vilanterol), Take once daily using Ellipta .   Stiolto  (olodaterol and tiotropium), Take once daily using Respimat .   Utibron  (indacaterol and glycopyrrolate), Take twice daily using Neohaler .   Bevespi  (glycopyrrolate and formoterol). Take twice daily using Aerosphere .      LABA & ICS include:   Advair  (fluticasone and salmeterol), Take twice daily using Discus  or MDI.   Breo  (fluticasone and vilanterol), Take once daily using Ellipta .   Symbicort  (budesonide and formoterol), Take once daily using MDI.  Dulera  (mometasone and formoterol), Take twice daily using MDI.  Pulmicort (budesonide nebulizer), Take twice daily using nebulizer    inhaled corticosteroid:  Qvar RediHaler (beclomethasone)   Flovent (fluticasone   Alvesco (ciclesonide)  Asmanex Twisthaler (mometasone)  Flovent HFA (fluticasone)  Arnuity Ellipta (fluticasone)  Pulmicort Flexhaler (budesonide)  Pulmicort Turbuhaler (budesonide)  Pulmicort Respules (budesonide)  Asmanex HFA (mometasone)   Aerospan HFA (flunisolide)   Aerobid-M (flunisolide)  Aerobid (flunisolide)  Pulmicort Nebuamp (budesonide)  Flovent Rotadisk (fluticasone)   Flovent  Diskus (fluticasone)  Beclovent (beclomethasone)  ArmonAir RespiClick (fluticasone)  ArmonAir Digihaler (fluticasone)

## 2021-12-14 ENCOUNTER — TELEPHONE (OUTPATIENT)
Dept: ONCOLOGY | Facility: CLINIC | Age: 86
End: 2021-12-14
Payer: MEDICARE

## 2021-12-14 NOTE — TELEPHONE ENCOUNTER
Patient calling to report that the Advair inhaler costs $300 and the Spiriva inhaler costs $400. Pt is wondering if we can prescribe alternative medications?  Please call back at [phone].

## 2021-12-15 NOTE — TELEPHONE ENCOUNTER
Marie called writer back. She reports she was unable to afford the Advair and Spiriva. Marie reports she continues to do her nebulizer 3 times a day and her albuterol inhaler once a day (on average).     Writer gave Marie the information from finance specialists about the free drug program through the Trelegy . Writer gave Marie the phone number to call: [phone]    Writer also gave Marie some names of inhalers from the list that Dr. Powell listed in the note from 12/9/21 to check with her insurance and see if any of those medications are covered.     Marie reports her daughter (Margaret) normally helps with matters like this but she is out of town this week. She is due back into down Friday night, 12/17. Consent to communicate found on file.     Marie will have Margaret call writer back once she is back in town and able to help Marie call her insurance or apply for the free drug program for Trelegy.    Marie reports her cough is the same and she is managing ok with the current nebs and inhaler that she has. Writer encouraged Marie to call if her cough gets any worse. Marie verbalized understanding and is in agreement with the plan.     Ksenia Silver, RN, BSN, ABDIAS  RN Care Coordinator  [facility]  [phone]

## 2021-12-15 NOTE — TELEPHONE ENCOUNTER
Writer called Marie back and there was no answer. Left a voicemail to return call to Dr. Powell's office at [phone].    Ksenia Silver, RN, BSN, ABDIAS  RN Care Coordinator  [facility]  [phone]

## 2021-12-22 NOTE — TELEPHONE ENCOUNTER
Writer called pt's daughter, Margaret and spoke with her about the inhaler issue. Margaret requested the information be sent to her in a My Chart Message. She will look at the options and call writer back.     Will watch for daughter or Marie to call.     Ksenia Silver, RN, BSN, ABDIAS  RN Care Coordinator  [facility]  [phone]

## 2021-12-23 ENCOUNTER — HOSPITAL ENCOUNTER (OUTPATIENT)
Dept: RESPIRATORY THERAPY | Facility: CLINIC | Age: 86
Discharge: HOME | End: 2021-12-23
Attending: INTERNAL MEDICINE | Admitting: INTERNAL MEDICINE
Payer: MEDICARE

## 2021-12-23 ENCOUNTER — HOSPITAL ENCOUNTER (OUTPATIENT)
Dept: CARDIAC REHAB | Facility: CLINIC | Age: 86
End: 2021-12-23
Attending: INTERNAL MEDICINE
Payer: MEDICARE

## 2021-12-23 DIAGNOSIS — J44.9 CHRONIC OBSTRUCTIVE PULMONARY DISEASE, UNSPECIFIED COPD TYPE (H): ICD-10-CM

## 2021-12-23 PROCEDURE — 250N000009 HC RX 250

## 2021-12-23 PROCEDURE — 999N000157 HC STATISTIC RCP TIME EA 10 MIN

## 2021-12-23 PROCEDURE — 94060 EVALUATION OF WHEEZING: CPT

## 2021-12-23 PROCEDURE — 94726 PLETHYSMOGRAPHY LUNG VOLUMES: CPT

## 2021-12-23 PROCEDURE — 94729 DIFFUSING CAPACITY: CPT

## 2021-12-23 PROCEDURE — 94618 PULMONARY STRESS TESTING: CPT | Performed by: REHABILITATION PRACTITIONER

## 2021-12-23 RX ORDER — ALBUTEROL SULFATE 0.83 MG/ML
SOLUTION RESPIRATORY (INHALATION)
Status: COMPLETED
Start: 2021-12-23 | End: 2021-12-23

## 2021-12-23 RX ADMIN — ALBUTEROL SULFATE: 2.5 SOLUTION RESPIRATORY (INHALATION) at 10:46

## 2021-12-23 ASSESSMENT — 6 MINUTE WALK TEST (6MWT)
TOTAL DISTANCE WALKED (FT): 640
O2 FLOW RATE (L/MIN) PEAK: 2
LOWEST SA02: 89
TOTAL DISTANCE WALKED (METERS): 195.07
HEART RATE PEAK: 73

## 2021-12-23 NOTE — PROGRESS NOTES
Patient completed pulmonary function testing with pre/post spirometry, lung volumes and diffusion. Good patient effort and cooperation. The results of this test met the ATS standards for acceptability and repeatability. Albuterol neb 2.5 mg given for bronchodilation. Hgb result of 9.9 was used from 9/23/21 for Diffusion. Patient refused Hgb lab draw, pt had an appointment to go to.  Albuterol was used at home 1.5 hours before testing. Patient can't remember how long or how much she smoked, in her chart it is recorded she quit 25 yrs ago. RA Sats=99%, HR 50  Radha Osei, RT on 12/23/2021 at 10:26 AM

## 2021-12-26 LAB
DLCOCOR-%PRED-PRE: 41 %
DLCOCOR-PRE: 6.41 ML/MIN/MMHG
DLCOUNC-%PRED-PRE: 36 %
DLCOUNC-PRE: 5.59 ML/MIN/MMHG
DLCOUNC-PRED: 15.31 ML/MIN/MMHG
ERV-%PRED-PRE: 40 %
ERV-PRE: 0.29 L
ERV-PRED: 0.73 L
EXPTIME-PRE: 7.94 SEC
FEF2575-%PRED-POST: 22 %
FEF2575-%PRED-PRE: 16 %
FEF2575-POST: 0.26 L/SEC
FEF2575-PRE: 0.2 L/SEC
FEF2575-PRED: 1.19 L/SEC
FEFMAX-%PRED-PRE: 34 %
FEFMAX-PRE: 1.06 L/SEC
FEFMAX-PRED: 3.11 L/SEC
FEV1-%PRED-PRE: 35 %
FEV1-PRE: 0.52 L
FEV1FEV6-PRE: 44 %
FEV1FEV6-PRED: 76 %
FEV1FVC-PRE: 44 %
FEV1FVC-PRED: 77 %
FEV1SVC-PRE: 38 %
FEV1SVC-PRED: 72 %
FIFMAX-PRE: 1.42 L/SEC
FRCPLETH-%PRED-PRE: 202 %
FRCPLETH-PRE: 4.94 L
FRCPLETH-PRED: 2.45 L
FVC-%PRED-PRE: 61 %
FVC-PRE: 1.19 L
FVC-PRED: 1.92 L
GAW-%PRED-PRE: 17 %
GAW-PRE: 0.18 L/S/CMH2O
GAW-PRED: 1.03 L/S/CMH2O
IC-%PRED-PRE: 84 %
IC-PRE: 1.1 L
IC-PRED: 1.3 L
RVPLETH-%PRED-PRE: 217 %
RVPLETH-PRE: 4.65 L
RVPLETH-PRED: 2.14 L
SGAW-%PRED-PRE: 35 %
SGAW-PRE: 0.04 1/CMH2O*S
SGAW-PRED: 0.1 1/CMH2O*S
SRAW-%PRED-PRE: 583 %
SRAW-PRE: 27.79 CMH2O*S
SRAW-PRED: 4.76 CMH2O*S
TLCPLETH-%PRED-PRE: 147 %
TLCPLETH-PRE: 6.04 L
TLCPLETH-PRED: 4.1 L
VA-%PRED-PRE: 78 %
VA-PRE: 2.86 L
VC-%PRED-PRE: 68 %
VC-PRE: 1.39 L
VC-PRED: 2.03 L

## 2021-12-31 ENCOUNTER — PATIENT OUTREACH (OUTPATIENT)
Dept: NURSING | Facility: CLINIC | Age: 86
End: 2021-12-31
Payer: MEDICARE

## 2021-12-31 NOTE — PROGRESS NOTES
"Clinic Care Coordination Contact    Community Health Worker Follow Up    Care Gaps:     Health Maintenance Due   Topic Date Due     HF ACTION PLAN  Never done     MICROALBUMIN  Never done     ZOSTER IMMUNIZATION (1 of 2) Never done     COVID-19 Vaccine (3 - Moderna risk 4-dose series) 03/15/2021     MEDICARE ANNUAL WELLNESS VISIT  01/06/2022     LIPID  01/06/2022       The patient is going to request a vacine at her independint living facility (COVID-19 booster).     Goals:   Goals Addressed as of 12/31/2021 at 1:08 PM                    Today    12/1/21       4. Medical (pt-stated)   90%  90%    Added 9/10/20 by Rogelio Gaming, SW      Goal Statement: I want my skin tears to heal within 18 months.  Date Goal set: 9.10.2020 (extended 8/31/2021, 10/1/2021, 12/7/2021)  Barriers: Medical conditions impacting fragility, many opportunities for injury.  Strengths: Strong support system, recognition of need, coordination with PCP office to manage cares and needs.  Date to Achieve By: 3.10.2022  Patient expressed understanding of goal: Pt reports understanding and denies any additional questions or concerns at this times. SW CC engaged in AIDET communication during encounter.    Action steps to achieve this goal:  1. I will manage wound care until PCP appointment.  2. I will follow-up with primary care regarding plan of care.  3. I will follow wound care recommendations until healed.      12-31-, CHW:    The patient stated that her wounds are \"not open,\" and bleeding on the surface. They are lumpy and \"like a blood clot\" underneath the skin. The patient stated that \"it looks like there is a marble underneath her skin.\"    They are located on the patient's right shin; there are also 3 on her left leg.             Intervention and Education during outreach: The patient was agreeable with the CHW putting a request in for the Community Paramedics to be referred to the patient. The patient does have a wellness check up " on 1/20/2022 to establish care with their new PCP. The patient stated that they are available for the paramedics to come any day except the 14th of jan. (the patient has an infusion appointment and the patient stated that they usually last 5 hours). The patient has an eye appointment and a hearing exam. The patient's son and son-in law bring the patient to appointments.     CHW Plan: The CHW will route the patient's chart to the lead CC for the Community Paramedics to be ordered. The CHW will reach out to the patient in 1 month to monitor the progression of their goals. How was their wellness exam? Were the community paramedics able to come to your house? Schedule COVID-19 booster at [facility]?    FERNY [facility]  Community Health Worker  [facility]  Clinic Care Coordination  [phone]  ----------------------------------------------------------  Next outreach: 2-1-2022  Preferred contact: [phone]    Enrollment:10-1-2021  Last Assessment: 10-1-2021  Frequency: Monthly

## 2022-01-01 ENCOUNTER — PATIENT OUTREACH (OUTPATIENT)
Dept: CARE COORDINATION | Facility: CLINIC | Age: 87
End: 2022-01-01

## 2022-01-01 ENCOUNTER — TRANSFERRED RECORDS (OUTPATIENT)
Dept: HEALTH INFORMATION MANAGEMENT | Facility: CLINIC | Age: 87
End: 2022-01-01

## 2022-01-01 ENCOUNTER — LAB REQUISITION (OUTPATIENT)
Dept: LAB | Facility: CLINIC | Age: 87
End: 2022-01-01
Payer: MEDICARE

## 2022-01-01 ENCOUNTER — ANESTHESIA (OUTPATIENT)
Dept: SURGERY | Facility: CLINIC | Age: 87
DRG: 521 | End: 2022-01-01
Payer: MEDICARE

## 2022-01-01 ENCOUNTER — TELEPHONE (OUTPATIENT)
Dept: GERIATRICS | Facility: CLINIC | Age: 87
End: 2022-01-01

## 2022-01-01 ENCOUNTER — TRANSITIONAL CARE UNIT VISIT (OUTPATIENT)
Dept: GERIATRICS | Facility: CLINIC | Age: 87
End: 2022-01-01
Payer: MEDICARE

## 2022-01-01 ENCOUNTER — APPOINTMENT (OUTPATIENT)
Dept: OCCUPATIONAL THERAPY | Facility: CLINIC | Age: 87
DRG: 521 | End: 2022-01-01
Attending: ORTHOPAEDIC SURGERY
Payer: MEDICARE

## 2022-01-01 ENCOUNTER — APPOINTMENT (OUTPATIENT)
Dept: PHYSICAL THERAPY | Facility: CLINIC | Age: 87
DRG: 521 | End: 2022-01-01
Payer: MEDICARE

## 2022-01-01 ENCOUNTER — APPOINTMENT (OUTPATIENT)
Dept: GENERAL RADIOLOGY | Facility: CLINIC | Age: 87
DRG: 521 | End: 2022-01-01
Attending: EMERGENCY MEDICINE
Payer: MEDICARE

## 2022-01-01 ENCOUNTER — HOSPITAL ENCOUNTER (INPATIENT)
Facility: CLINIC | Age: 87
LOS: 5 days | Discharge: NURSING, REHAB OR LONG-TERM CARE | DRG: 521 | End: 2022-08-30
Attending: EMERGENCY MEDICINE | Admitting: INTERNAL MEDICINE
Payer: MEDICARE

## 2022-01-01 ENCOUNTER — APPOINTMENT (OUTPATIENT)
Dept: OCCUPATIONAL THERAPY | Facility: CLINIC | Age: 87
DRG: 521 | End: 2022-01-01
Payer: MEDICARE

## 2022-01-01 ENCOUNTER — DOCUMENTATION ONLY (OUTPATIENT)
Dept: OTHER | Facility: CLINIC | Age: 87
End: 2022-01-01

## 2022-01-01 ENCOUNTER — APPOINTMENT (OUTPATIENT)
Dept: GENERAL RADIOLOGY | Facility: CLINIC | Age: 87
DRG: 521 | End: 2022-01-01
Attending: ORTHOPAEDIC SURGERY
Payer: MEDICARE

## 2022-01-01 ENCOUNTER — ANESTHESIA EVENT (OUTPATIENT)
Dept: SURGERY | Facility: CLINIC | Age: 87
DRG: 521 | End: 2022-01-01
Payer: MEDICARE

## 2022-01-01 ENCOUNTER — PATIENT OUTREACH (OUTPATIENT)
Dept: ONCOLOGY | Facility: CLINIC | Age: 87
End: 2022-01-01

## 2022-01-01 ENCOUNTER — LAB REQUISITION (OUTPATIENT)
Dept: LAB | Facility: CLINIC | Age: 87
End: 2022-01-01

## 2022-01-01 ENCOUNTER — DISCHARGE SUMMARY NURSING HOME (OUTPATIENT)
Dept: GERIATRICS | Facility: CLINIC | Age: 87
End: 2022-01-01
Payer: MEDICARE

## 2022-01-01 ENCOUNTER — VIRTUAL VISIT (OUTPATIENT)
Dept: PHARMACY | Facility: CLINIC | Age: 87
End: 2022-01-01
Payer: COMMERCIAL

## 2022-01-01 ENCOUNTER — TELEPHONE (OUTPATIENT)
Dept: PHARMACY | Facility: CLINIC | Age: 87
End: 2022-01-01

## 2022-01-01 ENCOUNTER — HEALTH MAINTENANCE LETTER (OUTPATIENT)
Age: 87
End: 2022-01-01

## 2022-01-01 VITALS
RESPIRATION RATE: 20 BRPM | SYSTOLIC BLOOD PRESSURE: 130 MMHG | WEIGHT: 84 LBS | TEMPERATURE: 98.6 F | HEIGHT: 59 IN | BODY MASS INDEX: 16.93 KG/M2 | HEART RATE: 90 BPM | OXYGEN SATURATION: 95 % | DIASTOLIC BLOOD PRESSURE: 76 MMHG

## 2022-01-01 VITALS
HEIGHT: 59 IN | TEMPERATURE: 98.5 F | BODY MASS INDEX: 16.93 KG/M2 | HEART RATE: 83 BPM | SYSTOLIC BLOOD PRESSURE: 123 MMHG | DIASTOLIC BLOOD PRESSURE: 66 MMHG | WEIGHT: 84 LBS | RESPIRATION RATE: 18 BRPM | OXYGEN SATURATION: 95 %

## 2022-01-01 VITALS
HEART RATE: 78 BPM | RESPIRATION RATE: 18 BRPM | OXYGEN SATURATION: 97 % | WEIGHT: 92 LBS | TEMPERATURE: 97.3 F | BODY MASS INDEX: 18.55 KG/M2 | SYSTOLIC BLOOD PRESSURE: 128 MMHG | HEIGHT: 59 IN | DIASTOLIC BLOOD PRESSURE: 51 MMHG

## 2022-01-01 VITALS
OXYGEN SATURATION: 91 % | HEART RATE: 68 BPM | TEMPERATURE: 98.6 F | BODY MASS INDEX: 16.93 KG/M2 | HEIGHT: 59 IN | WEIGHT: 84 LBS | DIASTOLIC BLOOD PRESSURE: 60 MMHG | RESPIRATION RATE: 18 BRPM | SYSTOLIC BLOOD PRESSURE: 122 MMHG

## 2022-01-01 VITALS
HEIGHT: 59 IN | OXYGEN SATURATION: 94 % | BODY MASS INDEX: 19.27 KG/M2 | TEMPERATURE: 98.1 F | DIASTOLIC BLOOD PRESSURE: 70 MMHG | RESPIRATION RATE: 18 BRPM | HEART RATE: 69 BPM | SYSTOLIC BLOOD PRESSURE: 135 MMHG | WEIGHT: 95.6 LBS

## 2022-01-01 VITALS
WEIGHT: 84 LBS | RESPIRATION RATE: 16 BRPM | TEMPERATURE: 98.4 F | SYSTOLIC BLOOD PRESSURE: 165 MMHG | HEART RATE: 74 BPM | BODY MASS INDEX: 16.93 KG/M2 | DIASTOLIC BLOOD PRESSURE: 68 MMHG | HEIGHT: 59 IN | OXYGEN SATURATION: 96 %

## 2022-01-01 VITALS
SYSTOLIC BLOOD PRESSURE: 114 MMHG | HEIGHT: 59 IN | OXYGEN SATURATION: 97 % | HEART RATE: 79 BPM | RESPIRATION RATE: 16 BRPM | BODY MASS INDEX: 16.93 KG/M2 | DIASTOLIC BLOOD PRESSURE: 54 MMHG | WEIGHT: 84 LBS | TEMPERATURE: 98 F

## 2022-01-01 VITALS
SYSTOLIC BLOOD PRESSURE: 114 MMHG | TEMPERATURE: 98.2 F | HEIGHT: 59 IN | BODY MASS INDEX: 16.93 KG/M2 | DIASTOLIC BLOOD PRESSURE: 76 MMHG | RESPIRATION RATE: 16 BRPM | OXYGEN SATURATION: 97 % | WEIGHT: 84 LBS | HEART RATE: 85 BPM

## 2022-01-01 VITALS
RESPIRATION RATE: 18 BRPM | DIASTOLIC BLOOD PRESSURE: 69 MMHG | SYSTOLIC BLOOD PRESSURE: 128 MMHG | HEART RATE: 87 BPM | HEIGHT: 59 IN | TEMPERATURE: 97.9 F | OXYGEN SATURATION: 96 % | WEIGHT: 84 LBS | BODY MASS INDEX: 16.93 KG/M2

## 2022-01-01 DIAGNOSIS — J96.22 ACUTE ON CHRONIC RESPIRATORY FAILURE WITH HYPOXIA AND HYPERCAPNIA (H): ICD-10-CM

## 2022-01-01 DIAGNOSIS — J42 CHRONIC BRONCHITIS, UNSPECIFIED CHRONIC BRONCHITIS TYPE (H): ICD-10-CM

## 2022-01-01 DIAGNOSIS — J96.11 CHRONIC RESPIRATORY FAILURE WITH HYPOXIA (H): ICD-10-CM

## 2022-01-01 DIAGNOSIS — J96.21 ACUTE ON CHRONIC RESPIRATORY FAILURE WITH HYPOXIA AND HYPERCAPNIA (H): ICD-10-CM

## 2022-01-01 DIAGNOSIS — N18.31 STAGE 3A CHRONIC KIDNEY DISEASE (H): ICD-10-CM

## 2022-01-01 DIAGNOSIS — I50.32 CHRONIC DIASTOLIC (CONGESTIVE) HEART FAILURE (H): ICD-10-CM

## 2022-01-01 DIAGNOSIS — S72.001D CLOSED FRACTURE OF NECK OF RIGHT FEMUR WITH ROUTINE HEALING, SUBSEQUENT ENCOUNTER: ICD-10-CM

## 2022-01-01 DIAGNOSIS — Z11.1 ENCOUNTER FOR SCREENING FOR RESPIRATORY TUBERCULOSIS: ICD-10-CM

## 2022-01-01 DIAGNOSIS — R50.9 FEVER, UNSPECIFIED FEVER CAUSE: ICD-10-CM
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
## 2022-01-01 DIAGNOSIS — G47.00 INSOMNIA, UNSPECIFIED TYPE: ICD-10-CM

## 2022-01-01 DIAGNOSIS — E03.9 HYPOTHYROIDISM, UNSPECIFIED TYPE: ICD-10-CM

## 2022-01-01 DIAGNOSIS — J44.1 COPD EXACERBATION (H): ICD-10-CM

## 2022-01-01 DIAGNOSIS — C91.10 CLL (CHRONIC LYMPHOCYTIC LEUKEMIA) (H): ICD-10-CM

## 2022-01-01 DIAGNOSIS — J44.1 COPD EXACERBATION (H): Primary | ICD-10-CM

## 2022-01-01 DIAGNOSIS — R41.89 COGNITIVE IMPAIRMENT: ICD-10-CM

## 2022-01-01 DIAGNOSIS — J96.22 ACUTE ON CHRONIC RESPIRATORY FAILURE WITH HYPOXIA AND HYPERCAPNIA (H): Primary | ICD-10-CM

## 2022-01-01 DIAGNOSIS — I42.9 CARDIOMYOPATHY, UNSPECIFIED (H): ICD-10-CM

## 2022-01-01 DIAGNOSIS — E87.1 HYPONATREMIA: ICD-10-CM

## 2022-01-01 DIAGNOSIS — Z85.51 HISTORY OF BLADDER CANCER: ICD-10-CM

## 2022-01-01 DIAGNOSIS — J44.9 CHRONIC OBSTRUCTIVE PULMONARY DISEASE, UNSPECIFIED COPD TYPE (H): ICD-10-CM

## 2022-01-01 DIAGNOSIS — F41.9 ANXIETY: ICD-10-CM

## 2022-01-01 DIAGNOSIS — N18.31 CHRONIC KIDNEY DISEASE, STAGE 3A (H): ICD-10-CM

## 2022-01-01 DIAGNOSIS — D64.9 ANEMIA, UNSPECIFIED: ICD-10-CM

## 2022-01-01 DIAGNOSIS — J96.21 ACUTE ON CHRONIC RESPIRATORY FAILURE WITH HYPOXIA AND HYPERCAPNIA (H): Primary | ICD-10-CM

## 2022-01-01 DIAGNOSIS — D80.1 ACQUIRED HYPOGAMMAGLOBULINEMIA (H): ICD-10-CM

## 2022-01-01 DIAGNOSIS — E43 UNSPECIFIED SEVERE PROTEIN-CALORIE MALNUTRITION (H): ICD-10-CM

## 2022-01-01 DIAGNOSIS — C91.10 CHRONIC LYMPHOCYTIC LEUKEMIA OF B-CELL TYPE NOT HAVING ACHIEVED REMISSION (H): ICD-10-CM

## 2022-01-01 DIAGNOSIS — R53.81 PHYSICAL DECONDITIONING: ICD-10-CM

## 2022-01-01 DIAGNOSIS — S72.001D CLOSED FRACTURE OF NECK OF RIGHT FEMUR WITH ROUTINE HEALING, SUBSEQUENT ENCOUNTER: Primary | ICD-10-CM

## 2022-01-01 DIAGNOSIS — I51.81 STRESS-INDUCED CARDIOMYOPATHY: ICD-10-CM
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
## 2022-01-01 DIAGNOSIS — S72.001D FRACTURE OF UNSPECIFIED PART OF NECK OF RIGHT FEMUR, SUBSEQUENT ENCOUNTER FOR CLOSED FRACTURE WITH ROUTINE HEALING: ICD-10-CM

## 2022-01-01 DIAGNOSIS — Z86.79 HISTORY OF CARDIOMYOPATHY: ICD-10-CM

## 2022-01-01 DIAGNOSIS — D64.9 CHRONIC ANEMIA: ICD-10-CM

## 2022-01-01 DIAGNOSIS — J18.9 PNEUMONIA OF LEFT LUNG DUE TO INFECTIOUS ORGANISM, UNSPECIFIED PART OF LUNG: Primary | ICD-10-CM

## 2022-01-01 DIAGNOSIS — R19.7 DIARRHEA, UNSPECIFIED TYPE: ICD-10-CM

## 2022-01-01 DIAGNOSIS — I10 ESSENTIAL HYPERTENSION: ICD-10-CM

## 2022-01-01 DIAGNOSIS — M81.0 OSTEOPOROSIS, UNSPECIFIED OSTEOPOROSIS TYPE, UNSPECIFIED PATHOLOGICAL FRACTURE PRESENCE: ICD-10-CM

## 2022-01-01 DIAGNOSIS — K59.00 CONSTIPATION, UNSPECIFIED CONSTIPATION TYPE: ICD-10-CM

## 2022-01-01 DIAGNOSIS — E03.9 ACQUIRED HYPOTHYROIDISM: ICD-10-CM

## 2022-01-01 DIAGNOSIS — R50.9 FEVER, UNSPECIFIED: ICD-10-CM

## 2022-01-01 DIAGNOSIS — J44.1 COPD WITH ACUTE EXACERBATION (H): ICD-10-CM

## 2022-01-01 DIAGNOSIS — I24.89 DEMAND ISCHEMIA (H): ICD-10-CM

## 2022-01-01 DIAGNOSIS — E87.1 HYPO-OSMOLALITY AND HYPONATREMIA: ICD-10-CM

## 2022-01-01 DIAGNOSIS — I10 ESSENTIAL (PRIMARY) HYPERTENSION: ICD-10-CM

## 2022-01-01 DIAGNOSIS — D69.6 THROMBOCYTOPENIA (H): ICD-10-CM

## 2022-01-01 DIAGNOSIS — E43 SEVERE MALNUTRITION (H): ICD-10-CM

## 2022-01-01 DIAGNOSIS — F41.1 GAD (GENERALIZED ANXIETY DISORDER): ICD-10-CM

## 2022-01-01 DIAGNOSIS — R50.9 FEVER, UNSPECIFIED FEVER CAUSE: Primary | ICD-10-CM

## 2022-01-01 DIAGNOSIS — R19.7 DIARRHEA, UNSPECIFIED TYPE: Primary | ICD-10-CM

## 2022-01-01 DIAGNOSIS — S72.001A CLOSED FRACTURE OF NECK OF RIGHT FEMUR, INITIAL ENCOUNTER (H): Primary | ICD-10-CM

## 2022-01-01 DIAGNOSIS — I25.119 CORONARY ARTERY DISEASE INVOLVING NATIVE HEART WITH ANGINA PECTORIS, UNSPECIFIED VESSEL OR LESION TYPE (H): ICD-10-CM

## 2022-01-01 DIAGNOSIS — G47.9 SLEEP DIFFICULTIES: ICD-10-CM

## 2022-01-01 DIAGNOSIS — D64.9 ANEMIA, UNSPECIFIED TYPE: ICD-10-CM

## 2022-01-01 DIAGNOSIS — F32.A DEPRESSION, UNSPECIFIED DEPRESSION TYPE: ICD-10-CM

## 2022-01-01 DIAGNOSIS — S72.001A CLOSED FRACTURE OF NECK OF RIGHT FEMUR, INITIAL ENCOUNTER (H): ICD-10-CM

## 2022-01-01 DIAGNOSIS — F51.01 PRIMARY INSOMNIA: ICD-10-CM

## 2022-01-01 DIAGNOSIS — R09.02 HYPOXIA: ICD-10-CM

## 2022-01-01 DIAGNOSIS — S92.406D CLOSED NONDISPLACED FRACTURE OF PHALANX OF GREAT TOE WITH ROUTINE HEALING, UNSPECIFIED LATERALITY, UNSPECIFIED PHALANX, SUBSEQUENT ENCOUNTER: ICD-10-CM

## 2022-01-01 LAB
ABO/RH(D): NORMAL
ALBUMIN SERPL BCG-MCNC: 3.3 G/DL (ref 3.5–5.2)
ALBUMIN UR-MCNC: 30 MG/DL
ALP SERPL-CCNC: 81 U/L (ref 35–104)
ALT SERPL W P-5'-P-CCNC: 13 U/L (ref 10–35)
ANION GAP SERPL CALCULATED.3IONS-SCNC: 2 MMOL/L (ref 7–15)
ANION GAP SERPL CALCULATED.3IONS-SCNC: 3 MMOL/L (ref 7–15)
ANION GAP SERPL CALCULATED.3IONS-SCNC: 4 MMOL/L (ref 7–15)
ANION GAP SERPL CALCULATED.3IONS-SCNC: 4 MMOL/L (ref 7–15)
ANION GAP SERPL CALCULATED.3IONS-SCNC: 6 MMOL/L (ref 7–15)
ANION GAP SERPL CALCULATED.3IONS-SCNC: 7 MMOL/L (ref 7–15)
ANTIBODY SCREEN: NEGATIVE
APPEARANCE UR: CLEAR
AST SERPL W P-5'-P-CCNC: 32 U/L (ref 10–35)
ATRIAL RATE - MUSE: 65 BPM
BACTERIA UR CULT: NORMAL
BASOPHILS # BLD MANUAL: 0 10E3/UL (ref 0–0.2)
BASOPHILS # BLD MANUAL: 0 10E3/UL (ref 0–0.2)
BASOPHILS NFR BLD MANUAL: 0 %
BASOPHILS NFR BLD MANUAL: 0 %
BILIRUB SERPL-MCNC: 0.3 MG/DL
BILIRUB UR QL STRIP: NEGATIVE
BUN SERPL-MCNC: 10.6 MG/DL (ref 8–23)
BUN SERPL-MCNC: 12 MG/DL (ref 8–23)
BUN SERPL-MCNC: 12.2 MG/DL (ref 8–23)
BUN SERPL-MCNC: 12.3 MG/DL (ref 8–23)
BUN SERPL-MCNC: 13.5 MG/DL (ref 8–23)
BUN SERPL-MCNC: 17.3 MG/DL (ref 8–23)
CALCIUM SERPL-MCNC: 8.4 MG/DL (ref 8.2–9.6)
CALCIUM SERPL-MCNC: 8.5 MG/DL (ref 8.2–9.6)
CALCIUM SERPL-MCNC: 8.6 MG/DL (ref 8.2–9.6)
CALCIUM SERPL-MCNC: 8.6 MG/DL (ref 8.2–9.6)
CALCIUM SERPL-MCNC: 8.9 MG/DL (ref 8.2–9.6)
CALCIUM SERPL-MCNC: 9.4 MG/DL (ref 8.2–9.6)
CAOX CRY #/AREA URNS HPF: ABNORMAL /HPF
CHLORIDE SERPL-SCNC: 93 MMOL/L (ref 98–107)
CHLORIDE SERPL-SCNC: 93 MMOL/L (ref 98–107)
CHLORIDE SERPL-SCNC: 95 MMOL/L (ref 98–107)
CHLORIDE SERPL-SCNC: 96 MMOL/L (ref 98–107)
CHLORIDE SERPL-SCNC: 98 MMOL/L (ref 98–107)
CHLORIDE SERPL-SCNC: 99 MMOL/L (ref 98–107)
COLOR UR AUTO: ABNORMAL
CREAT SERPL-MCNC: 0.56 MG/DL (ref 0.51–0.95)
CREAT SERPL-MCNC: 0.6 MG/DL (ref 0.51–0.95)
CREAT SERPL-MCNC: 0.69 MG/DL (ref 0.51–0.95)
CREAT SERPL-MCNC: 0.7 MG/DL (ref 0.51–0.95)
CREAT SERPL-MCNC: 0.75 MG/DL (ref 0.51–0.95)
CREAT SERPL-MCNC: 0.79 MG/DL (ref 0.51–0.95)
DEPRECATED HCO3 PLAS-SCNC: 32 MMOL/L (ref 22–29)
DEPRECATED HCO3 PLAS-SCNC: 32 MMOL/L (ref 22–29)
DEPRECATED HCO3 PLAS-SCNC: 33 MMOL/L (ref 22–29)
DEPRECATED HCO3 PLAS-SCNC: 33 MMOL/L (ref 22–29)
DEPRECATED HCO3 PLAS-SCNC: 34 MMOL/L (ref 22–29)
DEPRECATED HCO3 PLAS-SCNC: 38 MMOL/L (ref 22–29)
DIASTOLIC BLOOD PRESSURE - MUSE: NORMAL MMHG
ELLIPTOCYTES BLD QL SMEAR: SLIGHT
EOSINOPHIL # BLD MANUAL: 0 10E3/UL (ref 0–0.7)
EOSINOPHIL # BLD MANUAL: 0 10E3/UL (ref 0–0.7)
EOSINOPHIL NFR BLD MANUAL: 0 %
EOSINOPHIL NFR BLD MANUAL: 0 %
ERYTHROCYTE [DISTWIDTH] IN BLOOD BY AUTOMATED COUNT: 15.9 % (ref 10–15)
ERYTHROCYTE [DISTWIDTH] IN BLOOD BY AUTOMATED COUNT: 16 % (ref 10–15)
ERYTHROCYTE [DISTWIDTH] IN BLOOD BY AUTOMATED COUNT: 16 % (ref 10–15)
ERYTHROCYTE [DISTWIDTH] IN BLOOD BY AUTOMATED COUNT: 16.2 % (ref 10–15)
ERYTHROCYTE [DISTWIDTH] IN BLOOD BY AUTOMATED COUNT: 16.4 % (ref 10–15)
ERYTHROCYTE [DISTWIDTH] IN BLOOD BY AUTOMATED COUNT: 16.5 % (ref 10–15)
ERYTHROCYTE [DISTWIDTH] IN BLOOD BY AUTOMATED COUNT: 16.6 % (ref 10–15)
GAMMA INTERFERON BACKGROUND BLD IA-ACNC: 0.04 IU/ML
GAMMA INTERFERON BACKGROUND BLD IA-ACNC: 0.07 IU/ML
GFR SERPL CREATININE-BSD FRML MDRD: 71 ML/MIN/1.73M2
GFR SERPL CREATININE-BSD FRML MDRD: 75 ML/MIN/1.73M2
GFR SERPL CREATININE-BSD FRML MDRD: 82 ML/MIN/1.73M2
GFR SERPL CREATININE-BSD FRML MDRD: 82 ML/MIN/1.73M2
GFR SERPL CREATININE-BSD FRML MDRD: 85 ML/MIN/1.73M2
GFR SERPL CREATININE-BSD FRML MDRD: 86 ML/MIN/1.73M2
GLUCOSE BLDC GLUCOMTR-MCNC: 137 MG/DL (ref 70–99)
GLUCOSE SERPL-MCNC: 100 MG/DL (ref 70–99)
GLUCOSE SERPL-MCNC: 105 MG/DL (ref 70–99)
GLUCOSE SERPL-MCNC: 135 MG/DL (ref 70–99)
GLUCOSE SERPL-MCNC: 77 MG/DL (ref 70–99)
GLUCOSE SERPL-MCNC: 91 MG/DL (ref 70–99)
GLUCOSE SERPL-MCNC: 93 MG/DL (ref 70–99)
GLUCOSE SERPL-MCNC: 99 MG/DL (ref 70–99)
GLUCOSE UR STRIP-MCNC: NEGATIVE MG/DL
HCT VFR BLD AUTO: 25.9 % (ref 35–47)
HCT VFR BLD AUTO: 26.4 % (ref 35–47)
HCT VFR BLD AUTO: 27.7 % (ref 35–47)
HCT VFR BLD AUTO: 30 % (ref 35–47)
HCT VFR BLD AUTO: 30.1 % (ref 35–47)
HCT VFR BLD AUTO: 30.6 % (ref 35–47)
HCT VFR BLD AUTO: 35.2 % (ref 35–47)
HGB BLD-MCNC: 10.1 G/DL (ref 11.7–15.7)
HGB BLD-MCNC: 7 G/DL (ref 11.7–15.7)
HGB BLD-MCNC: 7.6 G/DL (ref 11.7–15.7)
HGB BLD-MCNC: 7.7 G/DL (ref 11.7–15.7)
HGB BLD-MCNC: 7.7 G/DL (ref 11.7–15.7)
HGB BLD-MCNC: 8 G/DL (ref 11.7–15.7)
HGB BLD-MCNC: 8.1 G/DL (ref 11.7–15.7)
HGB BLD-MCNC: 8.3 G/DL (ref 11.7–15.7)
HGB BLD-MCNC: 8.4 G/DL (ref 11.7–15.7)
HGB BLD-MCNC: 8.6 G/DL (ref 11.7–15.7)
HGB BLD-MCNC: 8.8 G/DL (ref 11.7–15.7)
HGB BLD-MCNC: 9 G/DL (ref 11.7–15.7)
HGB UR QL STRIP: NEGATIVE
HOLD SPECIMEN: NORMAL
HOLD SPECIMEN: NORMAL
INR PPP: 1.07 (ref 0.85–1.15)
INTERPRETATION ECG - MUSE: NORMAL
KETONES UR STRIP-MCNC: NEGATIVE MG/DL
LACTATE SERPL-SCNC: 0.8 MMOL/L (ref 0.7–2)
LEUKOCYTE ESTERASE UR QL STRIP: NEGATIVE
LYMPHOCYTES # BLD MANUAL: 56.5 10E3/UL (ref 0.8–5.3)
LYMPHOCYTES # BLD MANUAL: 89.8 10E3/UL (ref 0.8–5.3)
LYMPHOCYTES NFR BLD MANUAL: 93 %
LYMPHOCYTES NFR BLD MANUAL: 97 %
M TB IFN-G BLD-IMP: NEGATIVE
M TB IFN-G BLD-IMP: NEGATIVE
M TB IFN-G CD4+ BCKGRND COR BLD-ACNC: 6.28 IU/ML
M TB IFN-G CD4+ BCKGRND COR BLD-ACNC: 9.93 IU/ML
MCH RBC QN AUTO: 28.4 PG (ref 26.5–33)
MCH RBC QN AUTO: 28.6 PG (ref 26.5–33)
MCH RBC QN AUTO: 28.8 PG (ref 26.5–33)
MCH RBC QN AUTO: 29.1 PG (ref 26.5–33)
MCH RBC QN AUTO: 29.2 PG (ref 26.5–33)
MCH RBC QN AUTO: 29.3 PG (ref 26.5–33)
MCH RBC QN AUTO: 29.7 PG (ref 26.5–33)
MCHC RBC AUTO-ENTMCNC: 28 G/DL (ref 31.5–36.5)
MCHC RBC AUTO-ENTMCNC: 28.6 G/DL (ref 31.5–36.5)
MCHC RBC AUTO-ENTMCNC: 28.7 G/DL (ref 31.5–36.5)
MCHC RBC AUTO-ENTMCNC: 28.8 G/DL (ref 31.5–36.5)
MCHC RBC AUTO-ENTMCNC: 29.2 G/DL (ref 31.5–36.5)
MCHC RBC AUTO-ENTMCNC: 29.7 G/DL (ref 31.5–36.5)
MCHC RBC AUTO-ENTMCNC: 30 G/DL (ref 31.5–36.5)
MCV RBC AUTO: 100 FL (ref 78–100)
MCV RBC AUTO: 100 FL (ref 78–100)
MCV RBC AUTO: 101 FL (ref 78–100)
MCV RBC AUTO: 101 FL (ref 78–100)
MCV RBC AUTO: 102 FL (ref 78–100)
MCV RBC AUTO: 97 FL (ref 78–100)
MCV RBC AUTO: 99 FL (ref 78–100)
MITOGEN IGNF BCKGRD COR BLD-ACNC: 0.01 IU/ML
MITOGEN IGNF BCKGRD COR BLD-ACNC: 0.01 IU/ML
MITOGEN IGNF BCKGRD COR BLD-ACNC: 0.03 IU/ML
MITOGEN IGNF BCKGRD COR BLD-ACNC: 0.04 IU/ML
MONOCYTES # BLD MANUAL: 0 10E3/UL (ref 0–1.3)
MONOCYTES # BLD MANUAL: 1.8 10E3/UL (ref 0–1.3)
MONOCYTES NFR BLD MANUAL: 0 %
MONOCYTES NFR BLD MANUAL: 3 %
MUCOUS THREADS #/AREA URNS LPF: PRESENT /LPF
NEUTROPHILS # BLD MANUAL: 2.4 10E3/UL (ref 1.6–8.3)
NEUTROPHILS # BLD MANUAL: 2.8 10E3/UL (ref 1.6–8.3)
NEUTROPHILS NFR BLD MANUAL: 3 %
NEUTROPHILS NFR BLD MANUAL: 4 %
NITRATE UR QL: NEGATIVE
P AXIS - MUSE: 81 DEGREES
PATH REPORT.COMMENTS IMP SPEC: NORMAL
PATH REPORT.COMMENTS IMP SPEC: NORMAL
PATH REPORT.FINAL DX SPEC: NORMAL
PATH REPORT.GROSS SPEC: NORMAL
PATH REPORT.RELEVANT HX SPEC: NORMAL
PH UR STRIP: 5.5 [PH] (ref 5–7)
PHOTO IMAGE: NORMAL
PLAT MORPH BLD: ABNORMAL
PLAT MORPH BLD: ABNORMAL
PLAT MORPH BLD: NORMAL
PLATELET # BLD AUTO: 142 10E3/UL (ref 150–450)
PLATELET # BLD AUTO: 154 10E3/UL (ref 150–450)
PLATELET # BLD AUTO: 58 10E3/UL (ref 150–450)
PLATELET # BLD AUTO: 71 10E3/UL (ref 150–450)
PLATELET # BLD AUTO: 74 10E3/UL (ref 150–450)
PLATELET # BLD AUTO: 95 10E3/UL (ref 150–450)
PLATELET # BLD AUTO: 98 10E3/UL (ref 150–450)
POTASSIUM SERPL-SCNC: 4.1 MMOL/L (ref 3.4–5.3)
POTASSIUM SERPL-SCNC: 4.1 MMOL/L (ref 3.4–5.3)
POTASSIUM SERPL-SCNC: 4.3 MMOL/L (ref 3.4–5.3)
POTASSIUM SERPL-SCNC: 4.7 MMOL/L (ref 3.4–5.3)
POTASSIUM SERPL-SCNC: 4.7 MMOL/L (ref 3.4–5.3)
POTASSIUM SERPL-SCNC: 5 MMOL/L (ref 3.4–5.3)
PR INTERVAL - MUSE: 230 MS
PROT SERPL-MCNC: 5 G/DL (ref 6.4–8.3)
QRS DURATION - MUSE: 94 MS
QT - MUSE: 414 MS
QTC - MUSE: 430 MS
QUANTIFERON MITOGEN: 6.32 IU/ML
QUANTIFERON NIL TUBE: 0.04 IU/ML
QUANTIFERON TB1 TUBE: 0.05 IU/ML
QUANTIFERON TB2 TUBE: 0.05
R AXIS - MUSE: 71 DEGREES
RBC # BLD AUTO: 2.59 10E6/UL (ref 3.8–5.2)
RBC # BLD AUTO: 2.64 10E6/UL (ref 3.8–5.2)
RBC # BLD AUTO: 2.85 10E6/UL (ref 3.8–5.2)
RBC # BLD AUTO: 2.96 10E6/UL (ref 3.8–5.2)
RBC # BLD AUTO: 2.99 10E6/UL (ref 3.8–5.2)
RBC # BLD AUTO: 3.08 10E6/UL (ref 3.8–5.2)
RBC # BLD AUTO: 3.45 10E6/UL (ref 3.8–5.2)
RBC MORPH BLD: ABNORMAL
RBC MORPH BLD: ABNORMAL
RBC MORPH BLD: NORMAL
RBC URINE: <1 /HPF
SARS-COV-2 RNA RESP QL NAA+PROBE: NEGATIVE
SODIUM SERPL-SCNC: 128 MMOL/L (ref 136–145)
SODIUM SERPL-SCNC: 131 MMOL/L (ref 136–145)
SODIUM SERPL-SCNC: 135 MMOL/L (ref 136–145)
SODIUM SERPL-SCNC: 135 MMOL/L (ref 136–145)
SODIUM SERPL-SCNC: 136 MMOL/L (ref 136–145)
SODIUM SERPL-SCNC: 137 MMOL/L (ref 136–145)
SP GR UR STRIP: 1.02 (ref 1–1.03)
SPECIMEN EXPIRATION DATE: NORMAL
SQUAMOUS EPITHELIAL: <1 /HPF
SYSTOLIC BLOOD PRESSURE - MUSE: NORMAL MMHG
T AXIS - MUSE: 71 DEGREES
UROBILINOGEN UR STRIP-MCNC: NORMAL MG/DL
VENTRICULAR RATE- MUSE: 65 BPM
WBC # BLD AUTO: 154.2 10E3/UL (ref 4–11)
WBC # BLD AUTO: 201.8 10E3/UL (ref 4–11)
WBC # BLD AUTO: 60.7 10E3/UL (ref 4–11)
WBC # BLD AUTO: 73.6 10E3/UL (ref 4–11)
WBC # BLD AUTO: 77.9 10E3/UL (ref 4–11)
WBC # BLD AUTO: 86.3 10E3/UL (ref 4–11)
WBC # BLD AUTO: 92.6 10E3/UL (ref 4–11)
WBC URINE: 1 /HPF

## 2022-01-01 PROCEDURE — 999N000157 HC STATISTIC RCP TIME EA 10 MIN

## 2022-01-01 PROCEDURE — 85018 HEMOGLOBIN: CPT | Performed by: INTERNAL MEDICINE

## 2022-01-01 PROCEDURE — 80048 BASIC METABOLIC PNL TOTAL CA: CPT | Performed by: EMERGENCY MEDICINE

## 2022-01-01 PROCEDURE — 250N000012 HC RX MED GY IP 250 OP 636 PS 637: Performed by: INTERNAL MEDICINE

## 2022-01-01 PROCEDURE — 250N000013 HC RX MED GY IP 250 OP 250 PS 637: Performed by: INTERNAL MEDICINE

## 2022-01-01 PROCEDURE — 80048 BASIC METABOLIC PNL TOTAL CA: CPT | Performed by: INTERNAL MEDICINE

## 2022-01-01 PROCEDURE — 94640 AIRWAY INHALATION TREATMENT: CPT

## 2022-01-01 PROCEDURE — 82040 ASSAY OF SERUM ALBUMIN: CPT | Performed by: INTERNAL MEDICINE

## 2022-01-01 PROCEDURE — 97530 THERAPEUTIC ACTIVITIES: CPT | Mod: GP

## 2022-01-01 PROCEDURE — 250N000011 HC RX IP 250 OP 636: Performed by: ORTHOPAEDIC SURGERY

## 2022-01-01 PROCEDURE — 250N000009 HC RX 250: Performed by: INTERNAL MEDICINE

## 2022-01-01 PROCEDURE — 36415 COLL VENOUS BLD VENIPUNCTURE: CPT | Performed by: INTERNAL MEDICINE

## 2022-01-01 PROCEDURE — 97116 GAIT TRAINING THERAPY: CPT | Mod: GP

## 2022-01-01 PROCEDURE — 36415 COLL VENOUS BLD VENIPUNCTURE: CPT | Performed by: NURSE PRACTITIONER

## 2022-01-01 PROCEDURE — P9604 ONE-WAY ALLOW PRORATED TRIP: HCPCS | Performed by: NURSE PRACTITIONER

## 2022-01-01 PROCEDURE — 85027 COMPLETE CBC AUTOMATED: CPT | Performed by: NURSE PRACTITIONER

## 2022-01-01 PROCEDURE — 36415 COLL VENOUS BLD VENIPUNCTURE: CPT | Performed by: STUDENT IN AN ORGANIZED HEALTH CARE EDUCATION/TRAINING PROGRAM

## 2022-01-01 PROCEDURE — 88300 SURGICAL PATH GROSS: CPT | Mod: TC | Performed by: ORTHOPAEDIC SURGERY

## 2022-01-01 PROCEDURE — 85027 COMPLETE CBC AUTOMATED: CPT | Performed by: EMERGENCY MEDICINE

## 2022-01-01 PROCEDURE — 370N000017 HC ANESTHESIA TECHNICAL FEE, PER MIN: Performed by: ORTHOPAEDIC SURGERY

## 2022-01-01 PROCEDURE — 85007 BL SMEAR W/DIFF WBC COUNT: CPT | Performed by: INTERNAL MEDICINE

## 2022-01-01 PROCEDURE — 87086 URINE CULTURE/COLONY COUNT: CPT | Performed by: INTERNAL MEDICINE

## 2022-01-01 PROCEDURE — 99310 SBSQ NF CARE HIGH MDM 45: CPT | Performed by: PHYSICIAN ASSISTANT

## 2022-01-01 PROCEDURE — 250N000011 HC RX IP 250 OP 636: Performed by: EMERGENCY MEDICINE

## 2022-01-01 PROCEDURE — 99233 SBSQ HOSP IP/OBS HIGH 50: CPT | Performed by: STUDENT IN AN ORGANIZED HEALTH CARE EDUCATION/TRAINING PROGRAM

## 2022-01-01 PROCEDURE — 80048 BASIC METABOLIC PNL TOTAL CA: CPT | Performed by: NURSE PRACTITIONER

## 2022-01-01 PROCEDURE — 250N000011 HC RX IP 250 OP 636: Performed by: INTERNAL MEDICINE

## 2022-01-01 PROCEDURE — 278N000051 HC OR IMPLANT GENERAL: Performed by: ORTHOPAEDIC SURGERY

## 2022-01-01 PROCEDURE — 99305 1ST NF CARE MODERATE MDM 35: CPT | Performed by: INTERNAL MEDICINE

## 2022-01-01 PROCEDURE — 85027 COMPLETE CBC AUTOMATED: CPT | Performed by: STUDENT IN AN ORGANIZED HEALTH CARE EDUCATION/TRAINING PROGRAM

## 2022-01-01 PROCEDURE — 120N000001 HC R&B MED SURG/OB

## 2022-01-01 PROCEDURE — 250N000013 HC RX MED GY IP 250 OP 250 PS 637: Performed by: ORTHOPAEDIC SURGERY

## 2022-01-01 PROCEDURE — 71045 X-RAY EXAM CHEST 1 VIEW: CPT

## 2022-01-01 PROCEDURE — 99285 EMERGENCY DEPT VISIT HI MDM: CPT | Mod: 25

## 2022-01-01 PROCEDURE — C1776 JOINT DEVICE (IMPLANTABLE): HCPCS | Performed by: ORTHOPAEDIC SURGERY

## 2022-01-01 PROCEDURE — 85018 HEMOGLOBIN: CPT | Performed by: ORTHOPAEDIC SURGERY

## 2022-01-01 PROCEDURE — 99207 PR NO BILLABLE SERVICE THIS VISIT: CPT | Performed by: STUDENT IN AN ORGANIZED HEALTH CARE EDUCATION/TRAINING PROGRAM

## 2022-01-01 PROCEDURE — 999N000141 HC STATISTIC PRE-PROCEDURE NURSING ASSESSMENT: Performed by: ORTHOPAEDIC SURGERY

## 2022-01-01 PROCEDURE — 36415 COLL VENOUS BLD VENIPUNCTURE: CPT | Performed by: ORTHOPAEDIC SURGERY

## 2022-01-01 PROCEDURE — C1713 ANCHOR/SCREW BN/BN,TIS/BN: HCPCS | Performed by: ORTHOPAEDIC SURGERY

## 2022-01-01 PROCEDURE — 99316 NF DSCHRG MGMT 30 MIN+: CPT | Performed by: INTERNAL MEDICINE

## 2022-01-01 PROCEDURE — P9604 ONE-WAY ALLOW PRORATED TRIP: HCPCS | Performed by: INTERNAL MEDICINE

## 2022-01-01 PROCEDURE — 94640 AIRWAY INHALATION TREATMENT: CPT | Mod: 76

## 2022-01-01 PROCEDURE — 250N000009 HC RX 250: Performed by: NURSE ANESTHETIST, CERTIFIED REGISTERED

## 2022-01-01 PROCEDURE — 99232 SBSQ HOSP IP/OBS MODERATE 35: CPT | Performed by: INTERNAL MEDICINE

## 2022-01-01 PROCEDURE — P9603 ONE-WAY ALLOW PRORATED MILES: HCPCS | Performed by: INTERNAL MEDICINE

## 2022-01-01 PROCEDURE — 85027 COMPLETE CBC AUTOMATED: CPT | Performed by: INTERNAL MEDICINE

## 2022-01-01 PROCEDURE — 258N000003 HC RX IP 258 OP 636: Performed by: ORTHOPAEDIC SURGERY

## 2022-01-01 PROCEDURE — 96375 TX/PRO/DX INJ NEW DRUG ADDON: CPT

## 2022-01-01 PROCEDURE — 99223 1ST HOSP IP/OBS HIGH 75: CPT | Mod: AI | Performed by: INTERNAL MEDICINE

## 2022-01-01 PROCEDURE — 250N000011 HC RX IP 250 OP 636: Performed by: NURSE ANESTHETIST, CERTIFIED REGISTERED

## 2022-01-01 PROCEDURE — 99309 SBSQ NF CARE MODERATE MDM 30: CPT | Performed by: PHYSICIAN ASSISTANT

## 2022-01-01 PROCEDURE — 97530 THERAPEUTIC ACTIVITIES: CPT | Mod: GO

## 2022-01-01 PROCEDURE — 99316 NF DSCHRG MGMT 30 MIN+: CPT | Performed by: PHYSICIAN ASSISTANT

## 2022-01-01 PROCEDURE — 99239 HOSP IP/OBS DSCHRG MGMT >30: CPT | Performed by: STUDENT IN AN ORGANIZED HEALTH CARE EDUCATION/TRAINING PROGRAM

## 2022-01-01 PROCEDURE — 0SRR019 REPLACEMENT OF RIGHT HIP JOINT, FEMORAL SURFACE WITH METAL SYNTHETIC SUBSTITUTE, CEMENTED, OPEN APPROACH: ICD-10-PCS | Performed by: ORTHOPAEDIC SURGERY

## 2022-01-01 PROCEDURE — U0005 INFEC AGEN DETEC AMPLI PROBE: HCPCS | Performed by: EMERGENCY MEDICINE

## 2022-01-01 PROCEDURE — 96376 TX/PRO/DX INJ SAME DRUG ADON: CPT

## 2022-01-01 PROCEDURE — 99606 MTMS BY PHARM EST 15 MIN: CPT | Performed by: PHARMACIST

## 2022-01-01 PROCEDURE — 258N000003 HC RX IP 258 OP 636: Performed by: NURSE ANESTHETIST, CERTIFIED REGISTERED

## 2022-01-01 PROCEDURE — 93005 ELECTROCARDIOGRAM TRACING: CPT

## 2022-01-01 PROCEDURE — 86481 TB AG RESPONSE T-CELL SUSP: CPT | Performed by: INTERNAL MEDICINE

## 2022-01-01 PROCEDURE — 272N000001 HC OR GENERAL SUPPLY STERILE: Performed by: ORTHOPAEDIC SURGERY

## 2022-01-01 PROCEDURE — 73502 X-RAY EXAM HIP UNI 2-3 VIEWS: CPT

## 2022-01-01 PROCEDURE — 36415 COLL VENOUS BLD VENIPUNCTURE: CPT | Performed by: EMERGENCY MEDICINE

## 2022-01-01 PROCEDURE — 86850 RBC ANTIBODY SCREEN: CPT | Performed by: INTERNAL MEDICINE

## 2022-01-01 PROCEDURE — 86901 BLOOD TYPING SEROLOGIC RH(D): CPT | Performed by: INTERNAL MEDICINE

## 2022-01-01 PROCEDURE — 97535 SELF CARE MNGMENT TRAINING: CPT | Mod: GO

## 2022-01-01 PROCEDURE — 83605 ASSAY OF LACTIC ACID: CPT | Performed by: INTERNAL MEDICINE

## 2022-01-01 PROCEDURE — 99607 MTMS BY PHARM ADDL 15 MIN: CPT | Performed by: PHARMACIST

## 2022-01-01 PROCEDURE — 88300 SURGICAL PATH GROSS: CPT | Mod: 26 | Performed by: PATHOLOGY

## 2022-01-01 PROCEDURE — 97165 OT EVAL LOW COMPLEX 30 MIN: CPT | Mod: GO

## 2022-01-01 PROCEDURE — 85610 PROTHROMBIN TIME: CPT | Performed by: INTERNAL MEDICINE

## 2022-01-01 PROCEDURE — C9803 HOPD COVID-19 SPEC COLLECT: HCPCS

## 2022-01-01 PROCEDURE — 80053 COMPREHEN METABOLIC PANEL: CPT | Performed by: INTERNAL MEDICINE

## 2022-01-01 PROCEDURE — 85007 BL SMEAR W/DIFF WBC COUNT: CPT | Performed by: EMERGENCY MEDICINE

## 2022-01-01 PROCEDURE — 96374 THER/PROPH/DIAG INJ IV PUSH: CPT

## 2022-01-01 PROCEDURE — 710N000009 HC RECOVERY PHASE 1, LEVEL 1, PER MIN: Performed by: ORTHOPAEDIC SURGERY

## 2022-01-01 PROCEDURE — 85014 HEMATOCRIT: CPT | Performed by: INTERNAL MEDICINE

## 2022-01-01 PROCEDURE — 81001 URINALYSIS AUTO W/SCOPE: CPT | Performed by: INTERNAL MEDICINE

## 2022-01-01 PROCEDURE — 99232 SBSQ HOSP IP/OBS MODERATE 35: CPT | Performed by: STUDENT IN AN ORGANIZED HEALTH CARE EDUCATION/TRAINING PROGRAM

## 2022-01-01 PROCEDURE — 250N000013 HC RX MED GY IP 250 OP 250 PS 637: Performed by: EMERGENCY MEDICINE

## 2022-01-01 PROCEDURE — 97161 PT EVAL LOW COMPLEX 20 MIN: CPT | Mod: GP

## 2022-01-01 PROCEDURE — P9603 ONE-WAY ALLOW PRORATED MILES: HCPCS | Performed by: NURSE PRACTITIONER

## 2022-01-01 PROCEDURE — 360N000077 HC SURGERY LEVEL 4, PER MIN: Performed by: ORTHOPAEDIC SURGERY

## 2022-01-01 PROCEDURE — 999N000065 XR PELVIS AND HIP PORTABLE RIGHT 1 VIEW

## 2022-01-01 PROCEDURE — 85018 HEMOGLOBIN: CPT | Performed by: STUDENT IN AN ORGANIZED HEALTH CARE EDUCATION/TRAINING PROGRAM

## 2022-01-01 PROCEDURE — 80048 BASIC METABOLIC PNL TOTAL CA: CPT | Performed by: STUDENT IN AN ORGANIZED HEALTH CARE EDUCATION/TRAINING PROGRAM

## 2022-01-01 PROCEDURE — 82947 ASSAY GLUCOSE BLOOD QUANT: CPT | Performed by: STUDENT IN AN ORGANIZED HEALTH CARE EDUCATION/TRAINING PROGRAM

## 2022-01-01 PROCEDURE — 258N000001 HC RX 258: Performed by: ORTHOPAEDIC SURGERY

## 2022-01-01 DEVICE — TANDEM UNIPOLAR 12/14 TAPER SLEEVE                                    + 0
Type: IMPLANTABLE DEVICE | Site: HIP | Status: FUNCTIONAL
Brand: TANDEM

## 2022-01-01 DEVICE — TANDEM UNIPOLAR HEAD 43MM
Type: IMPLANTABLE DEVICE | Site: HIP | Status: FUNCTIONAL
Brand: TANDEM

## 2022-01-01 DEVICE — SYNERGY CEMENTED FEMORAL COMPONENT                                    SIZE 11
Type: IMPLANTABLE DEVICE | Site: HIP | Status: FUNCTIONAL
Brand: SYNERGY

## 2022-01-01 DEVICE — FULL DOSE BONE CEMENT, 10 PACK CATALOG NUMBER IS 6191-1-010
Type: IMPLANTABLE DEVICE | Site: HIP | Status: FUNCTIONAL
Brand: SIMPLEX

## 2022-01-01 DEVICE — INVIS DISTAL CENTRALIZER SIZE 9MM
Type: IMPLANTABLE DEVICE | Site: HIP | Status: FUNCTIONAL
Brand: INVIS

## 2022-01-01 DEVICE — BUCK FEMORAL CEMENT RESTRICTOR 18.5MM
Type: IMPLANTABLE DEVICE | Site: HIP | Status: FUNCTIONAL
Brand: BUCK

## 2022-01-01 RX ORDER — PREDNISONE 20 MG/1
20 TABLET ORAL DAILY
Status: DISPENSED | OUTPATIENT
Start: 2022-01-01 | End: 2022-01-01

## 2022-01-01 RX ORDER — ESCITALOPRAM OXALATE 10 MG/1
10 TABLET ORAL DAILY
Status: DISCONTINUED | OUTPATIENT
Start: 2022-01-01 | End: 2022-01-01 | Stop reason: HOSPADM

## 2022-01-01 RX ORDER — TRAMADOL HYDROCHLORIDE 50 MG/1
25-50 TABLET ORAL EVERY 6 HOURS PRN
Qty: 20 TABLET | Refills: 0 | Status: SHIPPED | OUTPATIENT
Start: 2022-01-01 | End: 2023-01-01

## 2022-01-01 RX ORDER — METHOCARBAMOL 500 MG/1
500 TABLET, FILM COATED ORAL 4 TIMES DAILY PRN
DISCHARGE
Start: 2022-01-01 | End: 2022-01-01

## 2022-01-01 RX ORDER — ASPIRIN 81 MG/1
81 TABLET, CHEWABLE ORAL DAILY
Refills: 0 | DISCHARGE
Start: 2022-01-01 | End: 2022-01-01

## 2022-01-01 RX ORDER — ACETAMINOPHEN 325 MG/1
650 TABLET ORAL EVERY 6 HOURS PRN
Status: DISCONTINUED | OUTPATIENT
Start: 2022-01-01 | End: 2022-01-01 | Stop reason: HOSPADM

## 2022-01-01 RX ORDER — NALOXONE HYDROCHLORIDE 0.4 MG/ML
0.2 INJECTION, SOLUTION INTRAMUSCULAR; INTRAVENOUS; SUBCUTANEOUS
Status: DISCONTINUED | OUTPATIENT
Start: 2022-01-01 | End: 2022-01-01 | Stop reason: HOSPADM

## 2022-01-01 RX ORDER — AMOXICILLIN 250 MG
1 CAPSULE ORAL 2 TIMES DAILY PRN
Qty: 30 TABLET | Refills: 0 | Status: SHIPPED | OUTPATIENT
Start: 2022-01-01 | End: 2022-01-01

## 2022-01-01 RX ORDER — PROCHLORPERAZINE MALEATE 5 MG
5 TABLET ORAL EVERY 6 HOURS PRN
Status: DISCONTINUED | OUTPATIENT
Start: 2022-01-01 | End: 2022-01-01 | Stop reason: HOSPADM

## 2022-01-01 RX ORDER — IPRATROPIUM BROMIDE AND ALBUTEROL SULFATE 2.5; .5 MG/3ML; MG/3ML
3 SOLUTION RESPIRATORY (INHALATION)
Status: DISCONTINUED | OUTPATIENT
Start: 2022-01-01 | End: 2022-01-01 | Stop reason: HOSPADM

## 2022-01-01 RX ORDER — CEFAZOLIN SODIUM 1 G/3ML
1 INJECTION, POWDER, FOR SOLUTION INTRAMUSCULAR; INTRAVENOUS EVERY 8 HOURS
Status: COMPLETED | OUTPATIENT
Start: 2022-01-01 | End: 2022-01-01

## 2022-01-01 RX ORDER — LEVOTHYROXINE SODIUM 88 UG/1
88 TABLET ORAL DAILY
Qty: 90 TABLET | Refills: 0 | Status: SHIPPED | OUTPATIENT
Start: 2022-01-01 | End: 2023-01-01

## 2022-01-01 RX ORDER — CEFAZOLIN SODIUM 500 MG/2.2ML
500 INJECTION, POWDER, FOR SOLUTION INTRAMUSCULAR; INTRAVENOUS ONCE
Status: DISCONTINUED | OUTPATIENT
Start: 2022-01-01 | End: 2022-01-01 | Stop reason: CLARIF

## 2022-01-01 RX ORDER — PROPOFOL 10 MG/ML
INJECTION, EMULSION INTRAVENOUS PRN
Status: DISCONTINUED | OUTPATIENT
Start: 2022-01-01 | End: 2022-01-01

## 2022-01-01 RX ORDER — OXYCODONE HYDROCHLORIDE 5 MG/1
5 TABLET ORAL EVERY 4 HOURS PRN
Status: DISCONTINUED | OUTPATIENT
Start: 2022-01-01 | End: 2022-01-01 | Stop reason: HOSPADM

## 2022-01-01 RX ORDER — LEVOTHYROXINE SODIUM 88 UG/1
88 TABLET ORAL DAILY
Status: DISCONTINUED | OUTPATIENT
Start: 2022-01-01 | End: 2022-01-01 | Stop reason: HOSPADM

## 2022-01-01 RX ORDER — DEXAMETHASONE SODIUM PHOSPHATE 4 MG/ML
INJECTION, SOLUTION INTRA-ARTICULAR; INTRALESIONAL; INTRAMUSCULAR; INTRAVENOUS; SOFT TISSUE PRN
Status: DISCONTINUED | OUTPATIENT
Start: 2022-01-01 | End: 2022-01-01

## 2022-01-01 RX ORDER — LOPERAMIDE HYDROCHLORIDE 2 MG/1
2 TABLET ORAL EVERY 4 HOURS PRN
COMMUNITY
End: 2023-01-01

## 2022-01-01 RX ORDER — TRAZODONE HYDROCHLORIDE 50 MG/1
50 TABLET, FILM COATED ORAL AT BEDTIME
Status: DISCONTINUED | OUTPATIENT
Start: 2022-01-01 | End: 2022-01-01 | Stop reason: HOSPADM

## 2022-01-01 RX ORDER — ACETAMINOPHEN 500 MG
1000 TABLET ORAL EVERY 8 HOURS
Status: DISCONTINUED | OUTPATIENT
Start: 2022-01-01 | End: 2022-01-01 | Stop reason: HOSPADM

## 2022-01-01 RX ORDER — SODIUM CHLORIDE, SODIUM LACTATE, POTASSIUM CHLORIDE, CALCIUM CHLORIDE 600; 310; 30; 20 MG/100ML; MG/100ML; MG/100ML; MG/100ML
INJECTION, SOLUTION INTRAVENOUS CONTINUOUS
Status: DISCONTINUED | OUTPATIENT
Start: 2022-01-01 | End: 2022-01-01 | Stop reason: HOSPADM

## 2022-01-01 RX ORDER — ONDANSETRON 2 MG/ML
4 INJECTION INTRAMUSCULAR; INTRAVENOUS EVERY 6 HOURS PRN
Status: DISCONTINUED | OUTPATIENT
Start: 2022-01-01 | End: 2022-01-01 | Stop reason: HOSPADM

## 2022-01-01 RX ORDER — AMOXICILLIN 250 MG
1 CAPSULE ORAL 2 TIMES DAILY
Status: DISCONTINUED | OUTPATIENT
Start: 2022-01-01 | End: 2022-01-01

## 2022-01-01 RX ORDER — OXYCODONE HYDROCHLORIDE 5 MG/1
5 TABLET ORAL ONCE
Status: COMPLETED | OUTPATIENT
Start: 2022-01-01 | End: 2022-01-01

## 2022-01-01 RX ORDER — CLINDAMYCIN PHOSPHATE 900 MG/50ML
900 INJECTION, SOLUTION INTRAVENOUS SEE ADMIN INSTRUCTIONS
Status: DISCONTINUED | OUTPATIENT
Start: 2022-01-01 | End: 2022-01-01 | Stop reason: HOSPADM

## 2022-01-01 RX ORDER — ALBUTEROL SULFATE 0.83 MG/ML
2.5 SOLUTION RESPIRATORY (INHALATION) EVERY 4 HOURS PRN
Status: DISCONTINUED | OUTPATIENT
Start: 2022-01-01 | End: 2022-01-01 | Stop reason: HOSPADM

## 2022-01-01 RX ORDER — HYDROMORPHONE HCL IN WATER/PF 6 MG/30 ML
0.2 PATIENT CONTROLLED ANALGESIA SYRINGE INTRAVENOUS EVERY 5 MIN PRN
Status: DISCONTINUED | OUTPATIENT
Start: 2022-01-01 | End: 2022-01-01 | Stop reason: HOSPADM

## 2022-01-01 RX ORDER — BUDESONIDE 0.5 MG/2ML
0.5 INHALANT ORAL 2 TIMES DAILY
Status: DISCONTINUED | OUTPATIENT
Start: 2022-01-01 | End: 2022-01-01 | Stop reason: HOSPADM

## 2022-01-01 RX ORDER — FENTANYL CITRATE 50 UG/ML
25 INJECTION, SOLUTION INTRAMUSCULAR; INTRAVENOUS EVERY 5 MIN PRN
Status: DISCONTINUED | OUTPATIENT
Start: 2022-01-01 | End: 2022-01-01 | Stop reason: HOSPADM

## 2022-01-01 RX ORDER — PREDNISONE 10 MG/1
20 TABLET ORAL DAILY
COMMUNITY
End: 2022-01-01

## 2022-01-01 RX ORDER — KETOROLAC TROMETHAMINE 15 MG/ML
15 INJECTION, SOLUTION INTRAMUSCULAR; INTRAVENOUS ONCE
Status: DISCONTINUED | OUTPATIENT
Start: 2022-01-01 | End: 2022-01-01

## 2022-01-01 RX ORDER — ONDANSETRON 4 MG/1
4 TABLET, ORALLY DISINTEGRATING ORAL EVERY 6 HOURS PRN
Status: DISCONTINUED | OUTPATIENT
Start: 2022-01-01 | End: 2022-01-01 | Stop reason: HOSPADM

## 2022-01-01 RX ORDER — NALOXONE HYDROCHLORIDE 0.4 MG/ML
0.4 INJECTION, SOLUTION INTRAMUSCULAR; INTRAVENOUS; SUBCUTANEOUS
Status: DISCONTINUED | OUTPATIENT
Start: 2022-01-01 | End: 2022-01-01 | Stop reason: HOSPADM

## 2022-01-01 RX ORDER — ACETAMINOPHEN 325 MG/1
TABLET ORAL
DISCHARGE
Start: 2022-01-01 | End: 2022-01-01

## 2022-01-01 RX ORDER — ONDANSETRON 2 MG/ML
4 INJECTION INTRAMUSCULAR; INTRAVENOUS EVERY 6 HOURS PRN
Status: DISCONTINUED | OUTPATIENT
Start: 2022-01-01 | End: 2022-01-01

## 2022-01-01 RX ORDER — ONDANSETRON 4 MG/1
4 TABLET, ORALLY DISINTEGRATING ORAL EVERY 30 MIN PRN
Status: DISCONTINUED | OUTPATIENT
Start: 2022-01-01 | End: 2022-01-01 | Stop reason: HOSPADM

## 2022-01-01 RX ORDER — ACETAMINOPHEN 650 MG/1
650 SUPPOSITORY RECTAL EVERY 6 HOURS PRN
Status: DISCONTINUED | OUTPATIENT
Start: 2022-01-01 | End: 2022-01-01 | Stop reason: HOSPADM

## 2022-01-01 RX ORDER — AMOXICILLIN 250 MG
1 CAPSULE ORAL 2 TIMES DAILY
Status: DISCONTINUED | OUTPATIENT
Start: 2022-01-01 | End: 2022-01-01 | Stop reason: HOSPADM

## 2022-01-01 RX ORDER — LEVOTHYROXINE SODIUM 88 UG/1
88 TABLET ORAL DAILY
Qty: 90 TABLET | Refills: 0 | Status: SHIPPED | OUTPATIENT
Start: 2022-01-01 | End: 2022-01-01

## 2022-01-01 RX ORDER — LIDOCAINE HYDROCHLORIDE 10 MG/ML
INJECTION, SOLUTION EPIDURAL; INFILTRATION; INTRACAUDAL; PERINEURAL PRN
Status: DISCONTINUED | OUTPATIENT
Start: 2022-01-01 | End: 2022-01-01

## 2022-01-01 RX ORDER — PREDNISONE 5 MG/1
10 TABLET ORAL DAILY
Status: DISCONTINUED | OUTPATIENT
Start: 2022-01-01 | End: 2022-01-01 | Stop reason: HOSPADM

## 2022-01-01 RX ORDER — HYDROXYZINE HYDROCHLORIDE 10 MG/1
10 TABLET, FILM COATED ORAL EVERY 6 HOURS PRN
Status: DISCONTINUED | OUTPATIENT
Start: 2022-01-01 | End: 2022-01-01 | Stop reason: HOSPADM

## 2022-01-01 RX ORDER — LANOLIN ALCOHOL/MO/W.PET/CERES
3 CREAM (GRAM) TOPICAL
Status: DISCONTINUED | OUTPATIENT
Start: 2022-01-01 | End: 2022-01-01 | Stop reason: HOSPADM

## 2022-01-01 RX ORDER — NEOSTIGMINE METHYLSULFATE 1 MG/ML
VIAL (ML) INJECTION PRN
Status: DISCONTINUED | OUTPATIENT
Start: 2022-01-01 | End: 2022-01-01

## 2022-01-01 RX ORDER — BISACODYL 10 MG
10 SUPPOSITORY, RECTAL RECTAL DAILY PRN
DISCHARGE
Start: 2022-01-01

## 2022-01-01 RX ORDER — MAGNESIUM HYDROXIDE/ALUMINUM HYDROXICE/SIMETHICONE 120; 1200; 1200 MG/30ML; MG/30ML; MG/30ML
30 SUSPENSION ORAL EVERY 4 HOURS PRN
Status: DISCONTINUED | OUTPATIENT
Start: 2022-01-01 | End: 2022-01-01 | Stop reason: HOSPADM

## 2022-01-01 RX ORDER — POLYETHYLENE GLYCOL 3350 17 G/17G
17 POWDER, FOR SOLUTION ORAL 2 TIMES DAILY
Qty: 510 G | DISCHARGE
Start: 2022-01-01 | End: 2022-01-01

## 2022-01-01 RX ORDER — AMOXICILLIN 250 MG
2 CAPSULE ORAL 2 TIMES DAILY
Status: DISCONTINUED | OUTPATIENT
Start: 2022-01-01 | End: 2022-01-01 | Stop reason: HOSPADM

## 2022-01-01 RX ORDER — ASPIRIN 81 MG/1
81 TABLET, CHEWABLE ORAL DAILY
Qty: 26 TABLET | Refills: 0 | Status: SHIPPED | OUTPATIENT
Start: 2022-01-01 | End: 2022-01-01

## 2022-01-01 RX ORDER — ACETAMINOPHEN 325 MG/1
650 TABLET ORAL EVERY 4 HOURS PRN
Status: DISCONTINUED | OUTPATIENT
Start: 2022-01-01 | End: 2022-01-01

## 2022-01-01 RX ORDER — TRAZODONE HYDROCHLORIDE 50 MG/1
TABLET, FILM COATED ORAL
Qty: 180 TABLET | Refills: 1 | Status: SHIPPED | OUTPATIENT
Start: 2022-01-01 | End: 2023-01-01

## 2022-01-01 RX ORDER — HYDROXYZINE HYDROCHLORIDE 10 MG/1
10 TABLET, FILM COATED ORAL 3 TIMES DAILY PRN
Status: DISCONTINUED | OUTPATIENT
Start: 2022-01-01 | End: 2022-01-01

## 2022-01-01 RX ORDER — TRAMADOL HYDROCHLORIDE 50 MG/1
25 TABLET ORAL EVERY 6 HOURS PRN
Qty: 20 TABLET | Refills: 0 | Status: SHIPPED | OUTPATIENT
Start: 2022-01-01 | End: 2022-01-01

## 2022-01-01 RX ORDER — METHOCARBAMOL 500 MG/1
500 TABLET, FILM COATED ORAL EVERY 6 HOURS PRN
Status: DISCONTINUED | OUTPATIENT
Start: 2022-01-01 | End: 2022-01-01 | Stop reason: HOSPADM

## 2022-01-01 RX ORDER — SODIUM CHLORIDE, SODIUM LACTATE, POTASSIUM CHLORIDE, CALCIUM CHLORIDE 600; 310; 30; 20 MG/100ML; MG/100ML; MG/100ML; MG/100ML
INJECTION, SOLUTION INTRAVENOUS CONTINUOUS
Status: DISCONTINUED | OUTPATIENT
Start: 2022-01-01 | End: 2022-01-01

## 2022-01-01 RX ORDER — ACETAMINOPHEN 650 MG/1
650 SUPPOSITORY RECTAL EVERY 6 HOURS PRN
Qty: 200 SUPPOSITORY | Refills: 0 | Status: SHIPPED | OUTPATIENT
Start: 2022-01-01 | End: 2022-01-01

## 2022-01-01 RX ORDER — ONDANSETRON 2 MG/ML
4 INJECTION INTRAMUSCULAR; INTRAVENOUS EVERY 30 MIN PRN
Status: DISCONTINUED | OUTPATIENT
Start: 2022-01-01 | End: 2022-01-01 | Stop reason: HOSPADM

## 2022-01-01 RX ORDER — PREDNISONE 10 MG/1
10 TABLET ORAL DAILY
COMMUNITY
End: 2022-01-01

## 2022-01-01 RX ORDER — ESCITALOPRAM OXALATE 10 MG/1
10 TABLET ORAL DAILY
Qty: 90 TABLET | Refills: 1 | Status: SHIPPED | OUTPATIENT
Start: 2022-01-01

## 2022-01-01 RX ORDER — METOPROLOL SUCCINATE 25 MG/1
25 TABLET, EXTENDED RELEASE ORAL DAILY
Status: DISCONTINUED | OUTPATIENT
Start: 2022-01-01 | End: 2022-01-01 | Stop reason: HOSPADM

## 2022-01-01 RX ORDER — TRANEXAMIC ACID 10 MG/ML
1 INJECTION, SOLUTION INTRAVENOUS ONCE
Status: COMPLETED | OUTPATIENT
Start: 2022-01-01 | End: 2022-01-01

## 2022-01-01 RX ORDER — ASPIRIN 81 MG/1
81 TABLET, CHEWABLE ORAL DAILY
Status: DISCONTINUED | OUTPATIENT
Start: 2022-01-01 | End: 2022-01-01 | Stop reason: HOSPADM

## 2022-01-01 RX ORDER — AMOXICILLIN AND CLAVULANATE POTASSIUM 500; 125 MG/1; MG/1
1 TABLET, FILM COATED ORAL 2 TIMES DAILY
Qty: 14 TABLET | Refills: 0
Start: 2022-01-01 | End: 2022-01-01

## 2022-01-01 RX ORDER — FUROSEMIDE 20 MG
20 TABLET ORAL DAILY
Status: DISCONTINUED | OUTPATIENT
Start: 2022-01-01 | End: 2022-01-01 | Stop reason: HOSPADM

## 2022-01-01 RX ORDER — LOPERAMIDE HCL 2 MG
CAPSULE ORAL
Qty: 60 CAPSULE | Refills: 0 | Status: SHIPPED | OUTPATIENT
Start: 2022-01-01 | End: 2023-01-01

## 2022-01-01 RX ORDER — HYDROMORPHONE HYDROCHLORIDE 1 MG/ML
0.3 INJECTION, SOLUTION INTRAMUSCULAR; INTRAVENOUS; SUBCUTANEOUS
Status: DISCONTINUED | OUTPATIENT
Start: 2022-01-01 | End: 2022-01-01

## 2022-01-01 RX ORDER — ONDANSETRON 4 MG/1
4 TABLET, ORALLY DISINTEGRATING ORAL EVERY 6 HOURS PRN
Status: DISCONTINUED | OUTPATIENT
Start: 2022-01-01 | End: 2022-01-01

## 2022-01-01 RX ORDER — ACETAMINOPHEN 325 MG/1
975 TABLET ORAL EVERY 8 HOURS
Status: DISCONTINUED | OUTPATIENT
Start: 2022-01-01 | End: 2022-01-01

## 2022-01-01 RX ORDER — GLYCOPYRROLATE 0.2 MG/ML
INJECTION, SOLUTION INTRAMUSCULAR; INTRAVENOUS PRN
Status: DISCONTINUED | OUTPATIENT
Start: 2022-01-01 | End: 2022-01-01

## 2022-01-01 RX ORDER — FENTANYL CITRATE 50 UG/ML
50 INJECTION, SOLUTION INTRAMUSCULAR; INTRAVENOUS EVERY 30 MIN PRN
Status: DISCONTINUED | OUTPATIENT
Start: 2022-01-01 | End: 2022-01-01

## 2022-01-01 RX ORDER — ACETAMINOPHEN 325 MG/1
650 TABLET ORAL ONCE
Status: COMPLETED | OUTPATIENT
Start: 2022-01-01 | End: 2022-01-01

## 2022-01-01 RX ORDER — CLINDAMYCIN PHOSPHATE 900 MG/50ML
900 INJECTION, SOLUTION INTRAVENOUS
Status: DISCONTINUED | OUTPATIENT
Start: 2022-01-01 | End: 2022-01-01 | Stop reason: HOSPADM

## 2022-01-01 RX ORDER — AMOXICILLIN 500 MG/1
500 CAPSULE ORAL 2 TIMES DAILY
Qty: 14 CAPSULE | Refills: 0
Start: 2022-01-01 | End: 2022-01-01

## 2022-01-01 RX ORDER — AMOXICILLIN 250 MG
1 CAPSULE ORAL 2 TIMES DAILY PRN
Qty: 30 TABLET | Refills: 0 | DISCHARGE
Start: 2022-01-01

## 2022-01-01 RX ORDER — LIDOCAINE 40 MG/G
CREAM TOPICAL
Status: DISCONTINUED | OUTPATIENT
Start: 2022-01-01 | End: 2022-01-01 | Stop reason: HOSPADM

## 2022-01-01 RX ORDER — CEFAZOLIN SODIUM/WATER 2 G/20 ML
2 SYRINGE (ML) INTRAVENOUS ONCE
Status: COMPLETED | OUTPATIENT
Start: 2022-01-01 | End: 2022-01-01

## 2022-01-01 RX ORDER — HYDROMORPHONE HCL IN WATER/PF 6 MG/30 ML
0.2 PATIENT CONTROLLED ANALGESIA SYRINGE INTRAVENOUS
Status: DISCONTINUED | OUTPATIENT
Start: 2022-01-01 | End: 2022-01-01 | Stop reason: HOSPADM

## 2022-01-01 RX ORDER — ACETAMINOPHEN 325 MG/1
TABLET ORAL
COMMUNITY
Start: 2022-01-01

## 2022-01-01 RX ORDER — LIDOCAINE 40 MG/G
CREAM TOPICAL
Status: DISCONTINUED | OUTPATIENT
Start: 2022-01-01 | End: 2022-01-01

## 2022-01-01 RX ORDER — BISACODYL 10 MG
10 SUPPOSITORY, RECTAL RECTAL DAILY PRN
Status: DISCONTINUED | OUTPATIENT
Start: 2022-01-01 | End: 2022-01-01 | Stop reason: HOSPADM

## 2022-01-01 RX ORDER — ALBUTEROL SULFATE 90 UG/1
1-2 AEROSOL, METERED RESPIRATORY (INHALATION) EVERY 6 HOURS PRN
Qty: 18 G | Refills: 11 | Status: SHIPPED | OUTPATIENT
Start: 2022-01-01

## 2022-01-01 RX ORDER — POLYETHYLENE GLYCOL 3350 17 G/17G
17 POWDER, FOR SOLUTION ORAL DAILY
Status: DISCONTINUED | OUTPATIENT
Start: 2022-01-01 | End: 2022-01-01 | Stop reason: HOSPADM

## 2022-01-01 RX ORDER — SODIUM CHLORIDE, SODIUM LACTATE, POTASSIUM CHLORIDE, CALCIUM CHLORIDE 600; 310; 30; 20 MG/100ML; MG/100ML; MG/100ML; MG/100ML
INJECTION, SOLUTION INTRAVENOUS CONTINUOUS PRN
Status: DISCONTINUED | OUTPATIENT
Start: 2022-01-01 | End: 2022-01-01

## 2022-01-01 RX ADMIN — ACETAMINOPHEN 1000 MG: 500 TABLET, FILM COATED ORAL at 08:24

## 2022-01-01 RX ADMIN — PHENYLEPHRINE HYDROCHLORIDE 100 MCG: 10 INJECTION INTRAVENOUS at 10:43

## 2022-01-01 RX ADMIN — PREDNISONE 10 MG: 5 TABLET ORAL at 08:32

## 2022-01-01 RX ADMIN — ACETAMINOPHEN 650 MG: 325 TABLET, FILM COATED ORAL at 15:27

## 2022-01-01 RX ADMIN — LIDOCAINE HYDROCHLORIDE 20 MG: 10 INJECTION, SOLUTION EPIDURAL; INFILTRATION; INTRACAUDAL; PERINEURAL at 09:56

## 2022-01-01 RX ADMIN — SODIUM CHLORIDE, POTASSIUM CHLORIDE, SODIUM LACTATE AND CALCIUM CHLORIDE: 600; 310; 30; 20 INJECTION, SOLUTION INTRAVENOUS at 09:20

## 2022-01-01 RX ADMIN — HYDROCORTISONE SODIUM SUCCINATE 25 MG: 100 INJECTION, POWDER, FOR SOLUTION INTRAMUSCULAR; INTRAVENOUS at 11:56

## 2022-01-01 RX ADMIN — METOPROLOL SUCCINATE 25 MG: 25 TABLET, FILM COATED, EXTENDED RELEASE ORAL at 08:32

## 2022-01-01 RX ADMIN — ACETAMINOPHEN 1000 MG: 500 TABLET, FILM COATED ORAL at 16:43

## 2022-01-01 RX ADMIN — IPRATROPIUM BROMIDE AND ALBUTEROL SULFATE 3 ML: .5; 3 SOLUTION RESPIRATORY (INHALATION) at 07:31

## 2022-01-01 RX ADMIN — TRAZODONE HYDROCHLORIDE 50 MG: 50 TABLET ORAL at 21:29

## 2022-01-01 RX ADMIN — IPRATROPIUM BROMIDE AND ALBUTEROL SULFATE 3 ML: .5; 3 SOLUTION RESPIRATORY (INHALATION) at 08:08

## 2022-01-01 RX ADMIN — TRAZODONE HYDROCHLORIDE 50 MG: 50 TABLET ORAL at 21:44

## 2022-01-01 RX ADMIN — ASPIRIN 81 MG CHEWABLE TABLET 81 MG: 81 TABLET CHEWABLE at 08:33

## 2022-01-01 RX ADMIN — FUROSEMIDE 20 MG: 20 TABLET ORAL at 08:07

## 2022-01-01 RX ADMIN — DEXAMETHASONE SODIUM PHOSPHATE 4 MG: 4 INJECTION, SOLUTION INTRA-ARTICULAR; INTRALESIONAL; INTRAMUSCULAR; INTRAVENOUS; SOFT TISSUE at 09:56

## 2022-01-01 RX ADMIN — ESCITALOPRAM OXALATE 10 MG: 10 TABLET ORAL at 17:07

## 2022-01-01 RX ADMIN — POLYETHYLENE GLYCOL 3350 17 G: 17 POWDER, FOR SOLUTION ORAL at 08:31

## 2022-01-01 RX ADMIN — Medication 1.5 G: at 09:46

## 2022-01-01 RX ADMIN — IPRATROPIUM BROMIDE AND ALBUTEROL SULFATE 3 ML: .5; 3 SOLUTION RESPIRATORY (INHALATION) at 08:02

## 2022-01-01 RX ADMIN — TRAMADOL HYDROCHLORIDE 25 MG: 50 TABLET ORAL at 09:59

## 2022-01-01 RX ADMIN — ACETAMINOPHEN 1000 MG: 500 TABLET, FILM COATED ORAL at 08:43

## 2022-01-01 RX ADMIN — SENNOSIDES AND DOCUSATE SODIUM 2 TABLET: 50; 8.6 TABLET ORAL at 08:32

## 2022-01-01 RX ADMIN — BUDESONIDE 0.5 MG: 0.5 INHALANT RESPIRATORY (INHALATION) at 19:52

## 2022-01-01 RX ADMIN — IPRATROPIUM BROMIDE AND ALBUTEROL SULFATE 3 ML: .5; 3 SOLUTION RESPIRATORY (INHALATION) at 12:26

## 2022-01-01 RX ADMIN — ACETAMINOPHEN 1000 MG: 500 TABLET, FILM COATED ORAL at 16:55

## 2022-01-01 RX ADMIN — ROCURONIUM BROMIDE 30 MG: 50 INJECTION, SOLUTION INTRAVENOUS at 09:56

## 2022-01-01 RX ADMIN — METOPROLOL SUCCINATE 25 MG: 25 TABLET, FILM COATED, EXTENDED RELEASE ORAL at 08:24

## 2022-01-01 RX ADMIN — PREDNISONE 10 MG: 5 TABLET ORAL at 08:07

## 2022-01-01 RX ADMIN — PREDNISONE 20 MG: 20 TABLET ORAL at 21:26

## 2022-01-01 RX ADMIN — HYDROMORPHONE HYDROCHLORIDE 0.3 MG: 1 INJECTION, SOLUTION INTRAMUSCULAR; INTRAVENOUS; SUBCUTANEOUS at 17:23

## 2022-01-01 RX ADMIN — PREDNISONE 20 MG: 20 TABLET ORAL at 08:24

## 2022-01-01 RX ADMIN — FENTANYL CITRATE 50 MCG: 50 INJECTION INTRAMUSCULAR; INTRAVENOUS at 12:27

## 2022-01-01 RX ADMIN — IPRATROPIUM BROMIDE AND ALBUTEROL SULFATE 3 ML: .5; 3 SOLUTION RESPIRATORY (INHALATION) at 19:52

## 2022-01-01 RX ADMIN — BUDESONIDE 0.5 MG: 0.5 INHALANT RESPIRATORY (INHALATION) at 08:02

## 2022-01-01 RX ADMIN — PREDNISONE 10 MG: 5 TABLET ORAL at 08:43

## 2022-01-01 RX ADMIN — IPRATROPIUM BROMIDE AND ALBUTEROL SULFATE 3 ML: .5; 3 SOLUTION RESPIRATORY (INHALATION) at 07:58

## 2022-01-01 RX ADMIN — PHENYLEPHRINE HYDROCHLORIDE 100 MCG: 10 INJECTION INTRAVENOUS at 10:52

## 2022-01-01 RX ADMIN — HYDROCORTISONE SODIUM SUCCINATE 25 MG: 100 INJECTION, POWDER, FOR SOLUTION INTRAMUSCULAR; INTRAVENOUS at 01:08

## 2022-01-01 RX ADMIN — HYDROXYZINE HYDROCHLORIDE 10 MG: 10 TABLET ORAL at 20:02

## 2022-01-01 RX ADMIN — SENNOSIDES AND DOCUSATE SODIUM 1 TABLET: 50; 8.6 TABLET ORAL at 08:24

## 2022-01-01 RX ADMIN — FENTANYL CITRATE 50 MCG: 50 INJECTION INTRAMUSCULAR; INTRAVENOUS at 13:40

## 2022-01-01 RX ADMIN — ACETAMINOPHEN 1000 MG: 500 TABLET, FILM COATED ORAL at 01:43

## 2022-01-01 RX ADMIN — IPRATROPIUM BROMIDE AND ALBUTEROL SULFATE 3 ML: .5; 3 SOLUTION RESPIRATORY (INHALATION) at 11:48

## 2022-01-01 RX ADMIN — LEVOTHYROXINE SODIUM 88 MCG: 0.09 TABLET ORAL at 08:27

## 2022-01-01 RX ADMIN — LEVOTHYROXINE SODIUM 88 MCG: 0.09 TABLET ORAL at 08:24

## 2022-01-01 RX ADMIN — ONDANSETRON 4 MG: 2 INJECTION INTRAMUSCULAR; INTRAVENOUS at 10:53

## 2022-01-01 RX ADMIN — ACETAMINOPHEN 1000 MG: 500 TABLET, FILM COATED ORAL at 01:24

## 2022-01-01 RX ADMIN — METOPROLOL SUCCINATE 25 MG: 25 TABLET, FILM COATED, EXTENDED RELEASE ORAL at 08:07

## 2022-01-01 RX ADMIN — BUDESONIDE 0.5 MG: 0.5 INHALANT RESPIRATORY (INHALATION) at 08:08

## 2022-01-01 RX ADMIN — ASPIRIN 325 MG: 325 TABLET ORAL at 18:34

## 2022-01-01 RX ADMIN — GLYCOPYRROLATE 0.3 MG: 0.2 INJECTION, SOLUTION INTRAMUSCULAR; INTRAVENOUS at 11:00

## 2022-01-01 RX ADMIN — CEFAZOLIN 1 G: 1 INJECTION, POWDER, FOR SOLUTION INTRAMUSCULAR; INTRAVENOUS at 01:08

## 2022-01-01 RX ADMIN — PROPOFOL 80 MG: 10 INJECTION, EMULSION INTRAVENOUS at 09:56

## 2022-01-01 RX ADMIN — BUDESONIDE 0.5 MG: 0.5 INHALANT RESPIRATORY (INHALATION) at 07:58

## 2022-01-01 RX ADMIN — BUDESONIDE 0.5 MG: 0.5 INHALANT RESPIRATORY (INHALATION) at 07:31

## 2022-01-01 RX ADMIN — FUROSEMIDE 20 MG: 20 TABLET ORAL at 08:32

## 2022-01-01 RX ADMIN — POLYETHYLENE GLYCOL 3350 17 G: 17 POWDER, FOR SOLUTION ORAL at 08:25

## 2022-01-01 RX ADMIN — ACETAMINOPHEN 1000 MG: 500 TABLET, FILM COATED ORAL at 00:27

## 2022-01-01 RX ADMIN — PHENYLEPHRINE HYDROCHLORIDE 100 MCG: 10 INJECTION INTRAVENOUS at 10:59

## 2022-01-01 RX ADMIN — TRANEXAMIC ACID 1 G: 10 INJECTION, SOLUTION INTRAVENOUS at 10:01

## 2022-01-01 RX ADMIN — IPRATROPIUM BROMIDE AND ALBUTEROL SULFATE 3 ML: .5; 3 SOLUTION RESPIRATORY (INHALATION) at 20:44

## 2022-01-01 RX ADMIN — IPRATROPIUM BROMIDE AND ALBUTEROL SULFATE 3 ML: .5; 3 SOLUTION RESPIRATORY (INHALATION) at 19:51

## 2022-01-01 RX ADMIN — ACETAMINOPHEN 1000 MG: 500 TABLET, FILM COATED ORAL at 01:07

## 2022-01-01 RX ADMIN — BUDESONIDE 0.5 MG: 0.5 INHALANT RESPIRATORY (INHALATION) at 20:45

## 2022-01-01 RX ADMIN — PHENYLEPHRINE HYDROCHLORIDE 100 MCG: 10 INJECTION INTRAVENOUS at 10:41

## 2022-01-01 RX ADMIN — ESCITALOPRAM OXALATE 10 MG: 10 TABLET ORAL at 08:43

## 2022-01-01 RX ADMIN — CEFAZOLIN 1 G: 1 INJECTION, POWDER, FOR SOLUTION INTRAMUSCULAR; INTRAVENOUS at 18:34

## 2022-01-01 RX ADMIN — ACETAMINOPHEN 1000 MG: 500 TABLET, FILM COATED ORAL at 22:12

## 2022-01-01 RX ADMIN — Medication 0.5 G: at 09:22

## 2022-01-01 RX ADMIN — ASPIRIN 81 MG CHEWABLE TABLET 81 MG: 81 TABLET CHEWABLE at 08:43

## 2022-01-01 RX ADMIN — ASPIRIN 81 MG CHEWABLE TABLET 81 MG: 81 TABLET CHEWABLE at 08:07

## 2022-01-01 RX ADMIN — TRAZODONE HYDROCHLORIDE 50 MG: 50 TABLET ORAL at 22:13

## 2022-01-01 RX ADMIN — IPRATROPIUM BROMIDE AND ALBUTEROL SULFATE 3 ML: .5; 3 SOLUTION RESPIRATORY (INHALATION) at 19:29

## 2022-01-01 RX ADMIN — METOPROLOL SUCCINATE 25 MG: 25 TABLET, FILM COATED, EXTENDED RELEASE ORAL at 08:43

## 2022-01-01 RX ADMIN — LEVOTHYROXINE SODIUM 88 MCG: 0.09 TABLET ORAL at 08:07

## 2022-01-01 RX ADMIN — ESCITALOPRAM OXALATE 10 MG: 10 TABLET ORAL at 08:31

## 2022-01-01 RX ADMIN — HYDROMORPHONE HYDROCHLORIDE 0.5 MG: 1 INJECTION, SOLUTION INTRAMUSCULAR; INTRAVENOUS; SUBCUTANEOUS at 10:14

## 2022-01-01 RX ADMIN — BUDESONIDE 0.5 MG: 0.5 INHALANT RESPIRATORY (INHALATION) at 19:29

## 2022-01-01 RX ADMIN — HYDROCORTISONE SODIUM SUCCINATE 25 MG: 100 INJECTION, POWDER, FOR SOLUTION INTRAMUSCULAR; INTRAVENOUS at 18:34

## 2022-01-01 RX ADMIN — HYDROCORTISONE SODIUM SUCCINATE 50 MG: 100 INJECTION, POWDER, FOR SOLUTION INTRAMUSCULAR; INTRAVENOUS at 10:15

## 2022-01-01 RX ADMIN — LEVOTHYROXINE SODIUM 88 MCG: 0.09 TABLET ORAL at 08:43

## 2022-01-01 RX ADMIN — IPRATROPIUM BROMIDE AND ALBUTEROL SULFATE 3 ML: .5; 3 SOLUTION RESPIRATORY (INHALATION) at 11:13

## 2022-01-01 RX ADMIN — ASPIRIN 325 MG: 325 TABLET ORAL at 08:24

## 2022-01-01 RX ADMIN — IPRATROPIUM BROMIDE AND ALBUTEROL SULFATE 3 ML: .5; 3 SOLUTION RESPIRATORY (INHALATION) at 16:08

## 2022-01-01 RX ADMIN — IPRATROPIUM BROMIDE AND ALBUTEROL SULFATE 3 ML: .5; 3 SOLUTION RESPIRATORY (INHALATION) at 15:34

## 2022-01-01 RX ADMIN — SENNOSIDES AND DOCUSATE SODIUM 1 TABLET: 50; 8.6 TABLET ORAL at 21:34

## 2022-01-01 RX ADMIN — NEOSTIGMINE METHYLSULFATE 2 MG: 1 INJECTION, SOLUTION INTRAVENOUS at 11:00

## 2022-01-01 RX ADMIN — TRAZODONE HYDROCHLORIDE 50 MG: 50 TABLET ORAL at 22:04

## 2022-01-01 RX ADMIN — SODIUM CHLORIDE, POTASSIUM CHLORIDE, SODIUM LACTATE AND CALCIUM CHLORIDE: 600; 310; 30; 20 INJECTION, SOLUTION INTRAVENOUS at 22:59

## 2022-01-01 RX ADMIN — FUROSEMIDE 20 MG: 20 TABLET ORAL at 08:24

## 2022-01-01 RX ADMIN — ACETAMINOPHEN 1000 MG: 500 TABLET, FILM COATED ORAL at 06:48

## 2022-01-01 RX ADMIN — ACETAMINOPHEN 1000 MG: 500 TABLET, FILM COATED ORAL at 16:08

## 2022-01-01 RX ADMIN — IPRATROPIUM BROMIDE AND ALBUTEROL SULFATE 3 ML: .5; 3 SOLUTION RESPIRATORY (INHALATION) at 20:50

## 2022-01-01 RX ADMIN — ESCITALOPRAM OXALATE 10 MG: 10 TABLET ORAL at 08:07

## 2022-01-01 RX ADMIN — OXYCODONE HYDROCHLORIDE 5 MG: 5 TABLET ORAL at 15:28

## 2022-01-01 RX ADMIN — SENNOSIDES AND DOCUSATE SODIUM 1 TABLET: 50; 8.6 TABLET ORAL at 20:01

## 2022-01-01 RX ADMIN — FUROSEMIDE 20 MG: 20 TABLET ORAL at 08:43

## 2022-01-01 RX ADMIN — IPRATROPIUM BROMIDE AND ALBUTEROL SULFATE 3 ML: .5; 3 SOLUTION RESPIRATORY (INHALATION) at 11:23

## 2022-01-01 RX ADMIN — ESCITALOPRAM OXALATE 10 MG: 10 TABLET ORAL at 08:24

## 2022-01-01 RX ADMIN — FUROSEMIDE 20 MG: 20 TABLET ORAL at 17:08

## 2022-01-01 RX ADMIN — SENNOSIDES AND DOCUSATE SODIUM 1 TABLET: 50; 8.6 TABLET ORAL at 20:28

## 2022-01-01 RX ADMIN — HYDROMORPHONE HYDROCHLORIDE 0.5 MG: 1 INJECTION, SOLUTION INTRAMUSCULAR; INTRAVENOUS; SUBCUTANEOUS at 10:25

## 2022-01-01 RX ADMIN — ACETAMINOPHEN 1000 MG: 500 TABLET, FILM COATED ORAL at 08:32

## 2022-01-01 RX ADMIN — TRANEXAMIC ACID 1 G: 10 INJECTION, SOLUTION INTRAVENOUS at 10:46

## 2022-01-01 RX ADMIN — BUDESONIDE 0.5 MG: 0.5 INHALANT RESPIRATORY (INHALATION) at 20:50

## 2022-01-01 RX ADMIN — IPRATROPIUM BROMIDE AND ALBUTEROL SULFATE 3 ML: .5; 3 SOLUTION RESPIRATORY (INHALATION) at 15:53

## 2022-01-01 RX ADMIN — ACETAMINOPHEN 1000 MG: 500 TABLET, FILM COATED ORAL at 08:07

## 2022-01-01 RX ADMIN — LEVOTHYROXINE SODIUM 88 MCG: 0.09 TABLET ORAL at 08:32

## 2022-01-01 RX ADMIN — TRAZODONE HYDROCHLORIDE 50 MG: 50 TABLET ORAL at 21:34

## 2022-01-01 RX ADMIN — IPRATROPIUM BROMIDE AND ALBUTEROL SULFATE 3 ML: .5; 3 SOLUTION RESPIRATORY (INHALATION) at 15:14

## 2022-01-01 RX ADMIN — ACETAMINOPHEN 1000 MG: 500 TABLET, FILM COATED ORAL at 17:07

## 2022-01-01 ASSESSMENT — ACTIVITIES OF DAILY LIVING (ADL)
ADLS_ACUITY_SCORE: 39
ADLS_ACUITY_SCORE: 35
ADLS_ACUITY_SCORE: 40
ADLS_ACUITY_SCORE: 39
DEPENDENT_IADLS:: SHOPPING;TRANSPORTATION
ADLS_ACUITY_SCORE: 39
ADLS_ACUITY_SCORE: 40
ADLS_ACUITY_SCORE: 39
ADLS_ACUITY_SCORE: 34
ADLS_ACUITY_SCORE: 34
ADLS_ACUITY_SCORE: 40
ADLS_ACUITY_SCORE: 37
ADLS_ACUITY_SCORE: 39
ADLS_ACUITY_SCORE: 34
ADLS_ACUITY_SCORE: 39
ADLS_ACUITY_SCORE: 40
ADLS_ACUITY_SCORE: 37
DEPENDENT_IADLS:: SHOPPING;TRANSPORTATION
ADLS_ACUITY_SCORE: 39
ADLS_ACUITY_SCORE: 37
ADLS_ACUITY_SCORE: 39
ADLS_ACUITY_SCORE: 40
ADLS_ACUITY_SCORE: 34
ADLS_ACUITY_SCORE: 39
ADLS_ACUITY_SCORE: 42
ADLS_ACUITY_SCORE: 40
ADLS_ACUITY_SCORE: 39
ADLS_ACUITY_SCORE: 37
ADLS_ACUITY_SCORE: 40
ADLS_ACUITY_SCORE: 39
ADLS_ACUITY_SCORE: 34
ADLS_ACUITY_SCORE: 39
ADLS_ACUITY_SCORE: 39
ADLS_ACUITY_SCORE: 37
ADLS_ACUITY_SCORE: 39
ADLS_ACUITY_SCORE: 35
ADLS_ACUITY_SCORE: 39
ADLS_ACUITY_SCORE: 42
ADLS_ACUITY_SCORE: 41
ADLS_ACUITY_SCORE: 39
ADLS_ACUITY_SCORE: 39
ADLS_ACUITY_SCORE: 35
ADLS_ACUITY_SCORE: 32
ADLS_ACUITY_SCORE: 39
ADLS_ACUITY_SCORE: 35
ADLS_ACUITY_SCORE: 35
ADLS_ACUITY_SCORE: 34
ADLS_ACUITY_SCORE: 39
ADLS_ACUITY_SCORE: 39
ADLS_ACUITY_SCORE: 37
ADLS_ACUITY_SCORE: 39
ADLS_ACUITY_SCORE: 34
ADLS_ACUITY_SCORE: 39
ADLS_ACUITY_SCORE: 34
ADLS_ACUITY_SCORE: 40
ADLS_ACUITY_SCORE: 34
ADLS_ACUITY_SCORE: 32
ADLS_ACUITY_SCORE: 38
ADLS_ACUITY_SCORE: 39

## 2022-01-01 ASSESSMENT — ENCOUNTER SYMPTOMS
HEADACHES: 0
ARTHRALGIAS: 1
COUGH: 0
FEVER: 0

## 2022-01-01 ASSESSMENT — COPD QUESTIONNAIRES: COPD: 1

## 2022-01-25 ENCOUNTER — PATIENT OUTREACH (OUTPATIENT)
Dept: CARE COORDINATION | Facility: CLINIC | Age: 87
End: 2022-01-25
Payer: MEDICARE

## 2022-01-25 ASSESSMENT — ACTIVITIES OF DAILY LIVING (ADL): DEPENDENT_IADLS:: CLEANING;SHOPPING;TRANSPORTATION

## 2022-01-25 NOTE — PROGRESS NOTES
Care Coordination Clinician Chart Review  Situation: Patient chart reviewed by care coordinator.       Background: Care Coordination initial assessment and enrollment to Care Coordination was 5/2019.   Patient centered goals were developed with participation from patient.  JOSIAS CH handed patient off to CHW for continued outreach every 30 days.        Assessment: Per chart review, patient outreach completed by CC CHW on 12/31/2021.  Patient is actively working to accomplish goal.  Patient's goal remains appropriate and relevant at this time.   Patient is due for updated Plan of Care.  Annual assessment will be due 9/2022.      Goals        Patient Stated       4. Medical (pt-stated)       Goal Statement: I want my skin tears to heal within 18 months.  Date Goal set: 9.10.2020 (extended 8/31/2021, 10/1/2021, 12/7/2021)  Barriers: Medical conditions impacting fragility, many opportunities for injury.  Strengths: Strong support system, recognition of need, coordination with PCP office to manage cares and needs.  Date to Achieve By: 3.10.2022  Patient expressed understanding of goal: Pt reports understanding and denies any additional questions or concerns at this times. JOSIAS CH engaged in AIDET communication during encounter.    Action steps to achieve this goal:  1. I will manage wound care until PCP appointment.  2. I will follow-up with primary care regarding plan of care.  3. I will follow wound care recommendations until healed.                  Plan/Recommendations: The patient will continue working with Care Coordination to achieve goal as above.  CHW will involve JOSIAS CH as needed or if patient is ready to move to maintenance.  JOSIAS CC will continue to monitor progress to goals and CHW outreaches every 6 weeks.   Plan of Care updated and mailed to patient: Yes, Luz Marina.     Rogelio Casey [facility] Care Coordinator  Ph. [phone]  [email or web address]

## 2022-01-25 NOTE — LETTER
[facility] CARE COORDINATION  [address]  [facility] [address]  January 25, 2022        Marie Kline  [address]  [facility] [address]          Dear Skyler Salazar is an updated Patient Centered Plan of Care for your continued enrollment in Care Coordination. Please let us know if you have additional questions, concerns, or goals that we can assist with.    Sincerely,    YESSI Vela  Clinic Care Coordinator  Ph. [phone]  [email or web address]  Patient Centered Plan of Care  About Me:        Patient Name:  Marie CHEATHAM Raisa    YOB: 1932  Age:         89 year old   [place] MRN:    [id number] Telephone Information:  Home Phone [phone]   Mobile [phone]       Address:  [address] [place] [address] [address]  [facility] [address] Email address:  [email or web address]      Emergency Contact(s)    Name Relationship Lgl Grd Work Phone Home Phone Mobile Phone   1. CHRISTIANLUIS ANTONIO, PE* Daughter No  [phone]   2. EMILIA BUCHANAN Grandchild No  [phone]   3. VEROIGNACIASOLEDADT, * Relative No   [phone]   4. MUNIR JACQUES* Grandchild No  [phone]   5. ROBIN KLINE Son    [phone]           Primary language:  English     needed? No   [place] [facility]:  [phone] op. 1  Other communication barriers:None  Preferred Method of Communication:  Mail  Current living arrangement: I live alone; I live in assisted living  Mobility Status/ Medical Equipment: Independent w/Device    Health Maintenance  Health Maintenance Reviewed: Due/Overdue   Health Maintenance Due   Topic Date Due     HF ACTION PLAN  Never done     MICROALBUMIN  Never done     ZOSTER IMMUNIZATION (1 of 2) Never done     COVID-19 Vaccine (3 - Moderna risk 4-dose series) 03/15/2021     PHQ-2  01/01/2022     LIPID  01/06/2022     MEDICARE ANNUAL WELLNESS VISIT  01/06/2022     My Access  Plan  Medical Emergency 911   Primary Clinic Line [facility] - [phone]   24 Hour Appointment Line [phone] or  [phone]) (toll-free)   24 Hour Nurse Line [phone] (toll-free)   Preferred Urgent Care [facility] [place], [phone]     Preferred Hospital [facility]  [phone]     Preferred Pharmacy [id number]  [place], [place] - [address] [facility]     Behavioral Health Crisis Line The National Suicide Prevention Lifeline at 1-128.584.2471 or 911             My Care Team Members  Patient Care Team       Relationship Specialty Notifications Start End    Nehal Freeman CNP PCP - General Internal Medicine  5/19/21     Phone: [phone] Fax: [phone]         [address] [facility] [address]    Isabel Ivory MD MD Oncology  2/6/17     Phone: [phone] Fax: [phone]         [place] [facility]  [address] [facility] [address]    Yuliana Cast, [facility] Pharmacist Pharmacist  4/16/19     Phone: [phone] Pager: [phone]         [address] [facility] [address] [facility] [address]    Rogelio Gaming, [facility] Lead Care Coordinator   6/16/20     Kar Nuñez MD Assigned Surgical Provider   10/23/20     Phone: [phone] Fax: [phone] [address] [facility] [address] [facility] [address]    Marva Powell MD Assigned Pulmonology Provider   5/2/21     Phone: [phone] Fax: [phone]         [address] [facility] [address] [facility] [address]    Sadiq Stein DPM Assigned Musculoskeletal Provider   5/16/21     Phone: [phone] Fax: [phone]         [address] [facility] [address]    Nehal Freeman CNP Assigned PCP   6/6/21     Phone: [phone] Fax: [phone] [address] [facility] [address]    Kellen May, [facility] Pharmacist Pharmacist Ambulatory Care  9/20/21     Phone: [phone] Fax: [phone] [address] [facility] [address]    Mera Mendez MA Community Health Worker   11/10/21             My Care Plans  Self Management and Treatment Plan  Goals and (Comments)  Goals        General     4. Medical (pt-stated)      Notes - Note edited  12/7/2021  9:22 AM by Rogelio Gaming, ELIESER     Goal Statement: I want my skin tears to heal within 18 months.  Date Goal set: 9.10.2020 (extended 8/31/2021, 10/1/2021, 12/7/2021)  Barriers: Medical conditions impacting fragility, many opportunities for injury.  Strengths: Strong support system, recognition of need, coordination with PCP office to manage cares and needs.  Date to Achieve By: 3.10.2022  Patient expressed understanding of goal: Pt reports understanding and denies any additional questions or concerns at this times. SW CC engaged in AIDET communication during encounter.    Action steps to achieve this goal:  1. I will manage wound care until PCP appointment.  2. I will follow-up with primary care regarding plan of care.  3. I will follow wound care recommendations until healed.               Action Plans on File:     COPD       My Medical and Care Information  Problem List   Patient Active Problem List   Diagnosis     Acute and chronic respiratory failure with hypercapnia (HCC)     Nonischemic cardiomyopathy (H)     Pneumonia     Acute myocardial infarction, initial episode of care (HCC)     CLL (chronic lymphocytic leukemia) (HCC)     CHF (congestive heart failure) (HCC)     Cardiomyopathy in other diseases classified elsewhere (HCC)     Hyperlipidemia with target LDL less than 130     Health Care Home     COPD exacerbation (H)     LESLY (generalized anxiety disorder)     Hypothyroidism     Back pain with radiation     DDD (degenerative disc disease), lumbar     Splenomegaly     Lumbar degenerative disc disease     Lumbar foraminal stenosis     Central spinal stenosis     Bladder cancer (H)     Sepsis (H)     Senile osteoporosis     CKD (chronic  kidney disease) stage 3, GFR 30-59 ml/min (H)     Purpura senilis (H)     Xerosis cutis     Malignant neoplasm of urinary bladder, unspecified site (H)     Hypoxia     Femoral neck fracture (H)     S/P total hip arthroplasty     NSTEMI (non-ST elevated myocardial infarction) (H)     Stress-induced cardiomyopathy     COPD with acute exacerbation (H)     Shoulder fracture     Mouth sores     Closed nondisplaced fracture of pelvis with routine healing, unspecified part of pelvis, subsequent encounter     Pelvic hematoma in female     Closed head injury, subsequent encounter     Multiple skin tears     Pneumonia of left lung due to infectious organism, unspecified part of lung     Chest pain     Coronary artery disease involving native heart with angina pectoris, unspecified vessel or lesion type (H)     Acquired hypogammaglobulinemia (H)     Chronic diastolic (congestive) heart failure (H)     Insomnia     Contusion of scalp, subsequent encounter     Essential (primary) hypertension     Fatigue     History of falling     Hypokalemia     Hyponatremia     Muscle weakness (generalized)     Other abnormalities of gait and mobility     Other injury of unspecified body region, subsequent encounter     Other symbolic dysfunctions     Weakness of left leg     Pulmonary nodules     Community acquired pneumonia, unspecified laterality     2019 novel coronavirus disease (COVID-19)      Current Medications and Allergies:  See printed Medication Report.  Current Outpatient Medications   Medication Instructions     ACE/ARB/ARNI NOT PRESCRIBED (INTENTIONAL) Please choose reason not prescribed from choices below.     albuterol (PROAIR HFA/PROVENTIL HFA/VENTOLIN HFA) 108 (90 Base) MCG/ACT inhaler 2 puffs, Inhalation, EVERY 6 HOURS     aspirin 81 mg, Oral, EVERY EVENING     budesonide (PULMICORT) 0.5 mg, Nebulization, 2 TIMES DAILY     ferrous sulfate (FEROSUL) 325 mg, Oral, DAILY WITH BREAKFAST     fluticasone (FLONASE) 50 MCG/ACT  nasal spray 1 spray, Both Nostrils, DAILY PRN     fluticasone-salmeterol (ADVAIR) 500-50 MCG/DOSE inhaler 1 puff, Inhalation, 2 TIMES DAILY     furosemide (LASIX) 20 mg, Oral, DAILY     Immune Globulin, Human, (OCTAGAM) 5 GM/100ML SOLN 300 mg/kg into the vein every 6 weeks.<BR><BR>Hold this medication while in TCU-resume at discharge from TCU     ipratropium - albuterol 0.5 mg/2.5 mg/3 mL (DUONEB) 0.5-2.5 (3) MG/3ML neb solution 1 vial, Nebulization, 3 TIMES DAILY     levothyroxine (SYNTHROID/LEVOTHROID) 75 mcg, Oral, DAILY     loperamide (IMODIUM) 2 mg, Oral, DAILY PRN     metoprolol succinate ER (TOPROL-XL) 25 mg, Oral, DAILY     Multiple Vitamins-Minerals (MULTIVITAMIN ADULT) CHEW 2 chew tab, Oral, DAILY     sertraline (ZOLOFT) 50 mg, Oral, DAILY     tiotropium (SPIRIVA) 18 mcg, Inhalation, DAILY     traZODone (DESYREL) 50 mg, Oral, AT BEDTIME        Allergies   Allergen Reactions     Diagnostic X-Ray Materials Hives     CT DYE     Contrast Dye Hives     CT DYE     Prolia [Denosumab]      Osteonecrosis jaw       Wound Dressing Adhesive        Care Coordination Start Date: 5/16/2019   Frequency of Care Coordination: monthly     Form Last Updated: 01/25/2022

## 2022-02-08 ENCOUNTER — TELEPHONE (OUTPATIENT)
Dept: CARE COORDINATION | Facility: CLINIC | Age: 87
End: 2022-02-08
Payer: MEDICARE

## 2022-02-08 ENCOUNTER — PATIENT OUTREACH (OUTPATIENT)
Dept: CARE COORDINATION | Facility: CLINIC | Age: 87
End: 2022-02-08
Payer: MEDICARE

## 2022-02-08 DIAGNOSIS — T14.8XXA OPEN WOUND: Primary | ICD-10-CM

## 2022-02-08 NOTE — PROGRESS NOTES
Clinic Care Coordination Contact  [facility]/Voicemail       Clinical Data: Care Coordinator Outreach  Outreach attempted x 1.  Left message on patient's voicemail with call back information and requested return call.    Plan: Care Coordinator will try to reach patient again in 10 business days.    KIM Encinas. Public Health  Community Health Worker  [place]  Clinic Care Coordination  [phone]  --------------------------------------------------------  Next outreach: 2/22/2022  Preferred contact: [phone]     Enrollment:10/1/2021  Last Assessment: 1/25/2022  Frequency: Monthly

## 2022-02-08 NOTE — TELEPHONE ENCOUNTER
Formerly [facility] Care Team,    CHW attempted to contact patient for Care Coordination Outreach (2/8/22). During previous outreach, CHW discussed Community paramedic involvement for a wound check (12/31/22). Patient failed to attend PCP appointment on 1/20/22 for referral to be placed.    Please contact Patient to assist with needs. Thank you      KIM Encinas. Public Health  Community Health Worker  [facility]  Clinic Care Coordination  [phone]

## 2022-02-15 ENCOUNTER — PATIENT OUTREACH (OUTPATIENT)
Dept: CARE COORDINATION | Facility: CLINIC | Age: 87
End: 2022-02-15
Payer: MEDICARE

## 2022-02-15 NOTE — PROGRESS NOTES
Community Paramedic Program  Community Health Worker Outreach    UTC/Voicemail    Outreach attempted x 1.      Left message on patient's voicemail with call back information and requested return call.    CHW Follow-up Plan: will try to reach patient again in 1-2 business days.    Note: Available with CP2 on 2/22 at 230-3 pm?      Referral source: PCP & CC CHW  Referral reason:   Reason(s) for visit: Recheck     Goals for visit(s): Decrease Wound     How often should patient be seen: Weekly     Preference on when patient should be seen: 3-7 Days     Comments   PCP: Nehal Freeman  Other info that would be helpful:

## 2022-02-17 ENCOUNTER — OFFICE VISIT (OUTPATIENT)
Dept: INTERNAL MEDICINE | Facility: CLINIC | Age: 87
End: 2022-02-17
Payer: MEDICARE

## 2022-02-17 VITALS
WEIGHT: 89 LBS | OXYGEN SATURATION: 97 % | RESPIRATION RATE: 20 BRPM | DIASTOLIC BLOOD PRESSURE: 72 MMHG | HEIGHT: 59 IN | SYSTOLIC BLOOD PRESSURE: 135 MMHG | BODY MASS INDEX: 17.94 KG/M2 | TEMPERATURE: 98.3 F | HEART RATE: 74 BPM

## 2022-02-17 DIAGNOSIS — Z00.00 ENCOUNTER FOR WELLNESS EXAMINATION: Primary | ICD-10-CM

## 2022-02-17 DIAGNOSIS — Z00.00 ENCOUNTER FOR MEDICARE ANNUAL WELLNESS EXAM: ICD-10-CM

## 2022-02-17 PROCEDURE — G0439 PPPS, SUBSEQ VISIT: HCPCS | Performed by: INTERNAL MEDICINE

## 2022-02-17 ASSESSMENT — PATIENT HEALTH QUESTIONNAIRE - PHQ9
SUM OF ALL RESPONSES TO PHQ QUESTIONS 1-9: 1
10. IF YOU CHECKED OFF ANY PROBLEMS, HOW DIFFICULT HAVE THESE PROBLEMS MADE IT FOR YOU TO DO YOUR WORK, TAKE CARE OF THINGS AT HOME, OR GET ALONG WITH OTHER PEOPLE: NOT DIFFICULT AT ALL
SUM OF ALL RESPONSES TO PHQ QUESTIONS 1-9: 1

## 2022-02-17 ASSESSMENT — ENCOUNTER SYMPTOMS
DIZZINESS: 0
SHORTNESS OF BREATH: 1
HEADACHES: 1
WEAKNESS: 1
NAUSEA: 0
BREAST MASS: 0
DIARRHEA: 1
COUGH: 1
EYE PAIN: 0
PALPITATIONS: 0
ABDOMINAL PAIN: 0
CHILLS: 0
SORE THROAT: 0
CONSTIPATION: 1
HEMATURIA: 0
NERVOUS/ANXIOUS: 0
HEARTBURN: 0
PARESTHESIAS: 1
FREQUENCY: 0
ARTHRALGIAS: 0
HEMATOCHEZIA: 0
JOINT SWELLING: 0
MYALGIAS: 0
FEVER: 0
DYSURIA: 0

## 2022-02-17 NOTE — PROGRESS NOTES
Clinic Care Coordination Contact  [facility]/Voicemail  CHW received VM from patient       Clinical Data: Care Coordinator Outreach  Outreach attempted x 2.  Left message on patient's voicemail with call back information and requested return call.    Plan: Care Coordinator will try to reach patient again in 10 business days.    KIM Encinas. Public Health  Community Health Worker  [place] [place] [facility]  Clinic Care Coordination  [phone]  ----------------------------------------------------  Next outreach: 3/3/2022  Preferred contact: [phone]     Enrollment:10/1/2021  Last Assessment: 1/25/2022  Frequency: Monthly

## 2022-02-17 NOTE — PROGRESS NOTES
"SUBJECTIVE:   Marie Kline is a 89 year old female who presents for Preventive Visit.      Patient has been advised of split billing requirements and indicates understanding: Yes  Are you in the first 12 months of your Medicare coverage?  No    Healthy Habits:     In general, how would you rate your overall health?  Fair    Frequency of exercise:  None    Do you usually eat at least 4 servings of fruit and vegetables a day, include whole grains    & fiber and avoid regularly eating high fat or \"junk\" foods?  No    Taking medications regularly:  Yes    Medication side effects:  None    Ability to successfully perform activities of daily living:  Transportation requires assistance and shopping requires assistance    Home Safety:  No safety concerns identified    Hearing Impairment:  Difficulty following a conversation in a noisy restaurant or crowded room and difficulty understanding soft or whispered speech    In the past 6 months, have you been bothered by leaking of urine?  No    In general, how would you rate your overall mental or emotional health?  Good      PHQ-2 Total Score: 0    Additional concerns today:  Yes    Do you feel safe in your environment? Yes    Have you ever done Advance Care Planning? (For example, a Health Directive, POLST, or a discussion with a medical provider or your loved ones about your wishes): Yes, advance care planning is on file.       Fall risk       Fallen two or more times in the last year   No   Any fall with an injury in the last year  No    Cognitive Screening   1) Repeat 3 items (Leader, Season, Table)      2) Clock draw:   abnormal  3) 3 item recall:   Recalls 1 object   Results: ABNORMAL clock, 1-2 items recalled: PROBABLE COGNITIVE IMPAIRMENT, **INFORM PROVIDER**    Mini-CogTM Copyright SHANNAN Larsen. Licensed by the author for use in [facility]; reprinted with permission (wayne@.[email or web address]). All rights reserved.      Do you have sleep apnea, excessive snoring or daytime " drowsiness?: no    Reviewed and updated as needed this visit by clinical staff     Meds              Reviewed and updated as needed this visit by Provider                 Social History     Tobacco Use     Smoking status: Former Smoker     Types: Cigarettes     Quit date: 2/3/1996     Years since quittin.0     Smokeless tobacco: Never Used   Substance Use Topics     Alcohol use: No     Alcohol/week: 0.0 standard drinks         Alcohol Use 2022   Prescreen: >3 drinks/day or >7 drinks/week? No           Hypertension Follow-up      Do you check your blood pressure regularly outside of the clinic? Yes     Are you following a low salt diet? Yes    Are your blood pressures ever more than 140 on the top number (systolic) OR more   than 90 on the bottom number (diastolic), for example 140/90? No      Current providers sharing in care for this patient include:   Patient Care Team:  [facility] as PCP - General (Internal Medicine)  Isabel Ivory MD as MD (Oncology)  Yuliana Cast [facility] as Pharmacist (Pharmacist)  Rogelio Gaming [facility] as Lead Care Coordinator  Kar Nuñez MD as Assigned Surgical Provider  Marva Powell MD as Assigned Pulmonology Provider  Sadiq Stein DPM as Assigned Musculoskeletal Provider  Leah Ventura NP as Assigned PCP    The following health maintenance items are reviewed in Epic and correct as of today:  Health Maintenance Due   Topic Date Due     HF ACTION PLAN  Never done     MICROALBUMIN  Never done     ANNUAL REVIEW OF HM ORDERS  Never done     ZOSTER IMMUNIZATION (1 of 2) Never done     COVID-19 Vaccine (3 - Moderna risk 4-dose series) 03/15/2021     MEDICARE ANNUAL WELLNESS VISIT  2022     LIPID  2022     {Chronicprobdata (optional):164866}  {Decision Support (Optional):854868}    {Mammo DS 75+ :258439}  Pertinent mammograms are reviewed under the imaging tab.    Review of Systems   Constitutional: Negative for  "chills and fever.   HENT: Positive for congestion and hearing loss. Negative for ear pain and sore throat.    Eyes: Negative for pain and visual disturbance.   Respiratory: Positive for cough and shortness of breath.    Cardiovascular: Positive for peripheral edema. Negative for chest pain and palpitations.   Gastrointestinal: Positive for constipation and diarrhea. Negative for abdominal pain, heartburn, hematochezia and nausea.   Breasts:  Negative for tenderness, breast mass and discharge.   Genitourinary: Negative for dysuria, frequency, genital sores, hematuria, pelvic pain, urgency, vaginal bleeding and vaginal discharge.   Musculoskeletal: Negative for arthralgias, joint swelling and myalgias.   Skin: Positive for rash.   Neurological: Positive for weakness, headaches and paresthesias. Negative for dizziness.   Psychiatric/Behavioral: Negative for mood changes. The patient is not nervous/anxious.      {ROS COMP (Optional):328057}    OBJECTIVE:   LMP  (LMP Unknown)  Estimated body mass index is 17.77 kg/m  as calculated from the following:    Height as of 10/6/21: 1.499 m (4' 11\").    Weight as of 10/25/21: 39.9 kg (88 lb).  Physical Exam  {Exam (Optional) :274561}    {Diagnostic Test Results (Optional):773403::\"Diagnostic Test Results:\",\"Labs reviewed in Epic\"}    ASSESSMENT / PLAN:   {Dia Picklist:987582}    {Patient advised of split billing (Optional):000880}    COUNSELING:  {Medicare Counselin}    Estimated body mass index is 17.77 kg/m  as calculated from the following:    Height as of 10/6/21: 1.499 m (4' 11\").    Weight as of 10/25/21: 39.9 kg (88 lb).    {Weight Management Plan (ACO) Complete if BMI is abnormal-  Ages 18-64  BMI >24.9.  Age 65+ with BMI <23 or >30 (Optional):502965}    She reports that she quit smoking about 26 years ago. Her smoking use included cigarettes. She has never used smokeless tobacco.      Appropriate preventive services were discussed with this patient, including " applicable screening as appropriate for cardiovascular disease, diabetes, osteopenia/osteoporosis, and glaucoma.  As appropriate for age/gender, discussed screening for colorectal cancer, prostate cancer, breast cancer, and cervical cancer. Checklist reviewing preventive services available has been given to the patient.    Reviewed patients plan of care and provided an AVS. The {CarePlan:619912} for Marie meets the Care Plan requirement. This Care Plan has been established and reviewed with the {PATIENT, FAMILY MEMBER, CAREGIVER:196401}.    Counseling Resources:  ATP IV Guidelines  Pooled Cohorts Equation Calculator  Breast Cancer Risk Calculator  Breast Cancer: Medication to Reduce Risk  FRAX Risk Assessment  ICSI Preventive Guidelines  Dietary Guidelines for Americans, 2010  [employer]'s MyPlate  ASA Prophylaxis  Lung CA Screening    Be Dumont MD  [facility]    Identified Health Risks:  Answers for HPI/ROS submitted by the patient on 2/17/2022  If you checked off any problems, how difficult have these problems made it for you to do your work, take care of things at home, or get along with other people?: Not difficult at all  PHQ9 TOTAL SCORE: 1

## 2022-02-17 NOTE — PROGRESS NOTES
"SUBJECTIVE:   Marie Kline is a 89 year old female who presents for Preventive Visit.  Answers for HPI/ROS submitted by the patient on 2/17/2022  If you checked off any problems, how difficult have these problems made it for you to do your work, take care of things at home, or get along with other people?: Not difficult at all  PHQ9 TOTAL SCORE: 1      Patient has been advised of split billing requirements and indicates understanding: Yes  Are you in the first 12 months of your Medicare coverage?  No    89-year-old  female who presents to the clinic for her annual Medicare wellness visit.  She has no acute concerns or complaints.    Healthy Habits:     In general, how would you rate your overall health?  Fair    Frequency of exercise:  None    Do you usually eat at least 4 servings of fruit and vegetables a day, include whole grains    & fiber and avoid regularly eating high fat or \"junk\" foods?  No    Taking medications regularly:  Yes    Medication side effects:  None    Ability to successfully perform activities of daily living:  Transportation requires assistance and shopping requires assistance    Home Safety:  No safety concerns identified    Hearing Impairment:  Difficulty following a conversation in a noisy restaurant or crowded room and difficulty understanding soft or whispered speech    In the past 6 months, have you been bothered by leaking of urine?  No    In general, how would you rate your overall mental or emotional health?  Good      PHQ-2 Total Score: 0    Additional concerns today:  Yes    Do you feel safe in your environment? Yes    Have you ever done Advance Care Planning? (For example, a Health Directive, POLST, or a discussion with a medical provider or your loved ones about your wishes): Yes, patient states has an Advance Care Planning document and will bring a copy to the clinic.      Fall risk     click delete button to remove this line now  Cognitive Screening   1) Repeat 3 items " (Leader, Season, Table)    2) Clock draw: ABNORMAL Minute hand was wrong  3) 3 item recall: Recalls 1 object   Results: ABNORMAL clock, 1-2 items recalled: PROBABLE COGNITIVE IMPAIRMENT, **INFORM PROVIDER**    Mini-CogTM Copyright SHANNAN Larsen. Licensed by the author for use in [facility]; reprinted with permission (wayne@[facility]). All rights reserved.      Do you have sleep apnea, excessive snoring or daytime drowsiness?: no    Reviewed and updated as needed this visit by clinical staff   Tobacco  Allergies  Meds  Problems             Reviewed and updated as needed this visit by Provider    Allergies  Meds  Problems            Social History     Tobacco Use     Smoking status: Former Smoker     Types: Cigarettes     Quit date: 2/3/1996     Years since quittin.0     Smokeless tobacco: Never Used   Substance Use Topics     Alcohol use: No     Alcohol/week: 0.0 standard drinks     If you drink alcohol do you typically have >3 drinks per day or >7 drinks per week? No    Alcohol Use 2022   Prescreen: >3 drinks/day or >7 drinks/week? No               Current providers sharing in care for this patient include:   Patient Care Team:  [facility] as PCP - General (Internal Medicine)  Isabel Ivory MD as MD (Oncology)  Yuliana Cast [facility] as Pharmacist (Pharmacist)  Rogelio Gaming [facility] as Lead Care Coordinator  Kar Nuñez MD as Assigned Surgical Provider  Marva Poewll MD as Assigned Pulmonology Provider  Sadiq Stein DPM as Assigned Musculoskeletal Provider  Leah Ventura NP as Assigned PCP    The following health maintenance items are reviewed in Epic and correct as of today:  Health Maintenance Due   Topic Date Due     HF ACTION PLAN  Never done     MICROALBUMIN  Never done     ZOSTER IMMUNIZATION (1 of 2) Never done     LIPID  2022         Mammogram Screening - Patient over age 75, has elected to discontinue screenings.  Pertinent  "mammograms are reviewed under the imaging tab.    Review of Systems   Constitutional: Negative for chills and fever.   HENT: Positive for congestion and hearing loss. Negative for ear pain and sore throat.    Eyes: Negative for pain and visual disturbance.   Respiratory: Positive for cough and shortness of breath.    Cardiovascular: Positive for peripheral edema. Negative for chest pain and palpitations.   Gastrointestinal: Positive for constipation and diarrhea. Negative for abdominal pain, heartburn, hematochezia and nausea.   Breasts:  Negative for tenderness, breast mass and discharge.   Genitourinary: Negative for dysuria, frequency, genital sores, hematuria, pelvic pain, urgency, vaginal bleeding and vaginal discharge.   Musculoskeletal: Negative for arthralgias, joint swelling and myalgias.   Skin: Positive for rash.   Neurological: Positive for weakness, headaches and paresthesias. Negative for dizziness.   Psychiatric/Behavioral: Negative for mood changes. The patient is not nervous/anxious.          OBJECTIVE:   /72   Pulse 74   Temp 98.3  F (36.8  C)   Resp 20   Ht 1.499 m (4' 11\")   Wt 40.4 kg (89 lb)   LMP  (LMP Unknown)   SpO2 97%   Breastfeeding No   BMI 17.98 kg/m   Estimated body mass index is 17.98 kg/m  as calculated from the following:    Height as of this encounter: 1.499 m (4' 11\").    Weight as of this encounter: 40.4 kg (89 lb).  Physical Exam  Vitals and nursing note reviewed.   Constitutional:       Appearance: Normal appearance.   HENT:      Head: Normocephalic and atraumatic.      Right Ear: Tympanic membrane, ear canal and external ear normal.      Left Ear: Tympanic membrane, ear canal and external ear normal.      Mouth/Throat:      Mouth: Mucous membranes are moist.      Pharynx: Oropharynx is clear.   Eyes:      Extraocular Movements: Extraocular movements intact.      Conjunctiva/sclera: Conjunctivae normal.      Pupils: Pupils are equal, round, and reactive to light. " "  Cardiovascular:      Rate and Rhythm: Normal rate and regular rhythm.      Pulses: Normal pulses.      Heart sounds: Normal heart sounds.   Pulmonary:      Effort: Pulmonary effort is normal.      Breath sounds: Normal breath sounds.   Abdominal:      General: Bowel sounds are normal.      Palpations: Abdomen is soft.   Skin:     Capillary Refill: Capillary refill takes less than 2 seconds.   Neurological:      Mental Status: She is alert.   Psychiatric:         Mood and Affect: Mood normal.         Behavior: Behavior normal.           ASSESSMENT / PLAN:   (Z00.00) Encounter for wellness examination  (primary encounter diagnosis)  Comment: Wellness plan reviewed and completed.      Patient has been advised of split billing requirements and indicates understanding: Yes    COUNSELING:  Reviewed preventive health counseling, as reflected in patient instructions    Estimated body mass index is 17.98 kg/m  as calculated from the following:    Height as of this encounter: 1.499 m (4' 11\").    Weight as of this encounter: 40.4 kg (89 lb).    Weight management plan noted, stable and monitoring    She reports that she quit smoking about 26 years ago. Her smoking use included cigarettes. She has never used smokeless tobacco.      Appropriate preventive services were discussed with this patient, including applicable screening as appropriate for cardiovascular disease, diabetes, osteopenia/osteoporosis, and glaucoma.  As appropriate for age/gender, discussed screening for colorectal cancer, prostate cancer, breast cancer, and cervical cancer. Checklist reviewing preventive services available has been given to the patient.    Reviewed patients plan of care and provided an AVS. The Basic Care Plan (routine screening as documented in Health Maintenance) for Marie meets the Care Plan requirement. This Care Plan has been established and reviewed with the Patient.    Counseling Resources:  ATP IV Guidelines  Pooled Cohorts Equation " Calculator  Breast Cancer Risk Calculator  Breast Cancer: Medication to Reduce Risk  FRAX Risk Assessment  ICSI Preventive Guidelines  Dietary Guidelines for Americans, 2010  [employer]'s MyPlate  ASA Prophylaxis  Lung CA Screening    Be Dumont MD  [facility]    Identified Health Risks:

## 2022-02-17 NOTE — PROGRESS NOTES
Community Paramedic Program  Community Health Worker Outreach    UTC/Voicemail    Outreach attempted x 1.      Left message on patient's voicemail with call back information and requested return call.    CHW Follow-up Plan: will try to reach patient again in 1-2 business days.    Note:  Left pt reminder about her in clinic visit with PCP today at 2:40 pm  Available 2/25 with CP2?    Referral source: Pt's PCP & CC CHW  Referral reason:   Reason(s) for visit: Recheck     Goals for visit(s): Decrease Wound     How often should patient be seen: Weekly     Preference on when patient should be seen: 3-7 Days     Comments   PCP: Nehal Freeman  Other info that would be helpful:

## 2022-02-17 NOTE — PATIENT INSTRUCTIONS
Patient Education   Personalized Prevention Plan  You are due for the preventive services outlined below.  Your care team is available to assist you in scheduling these services.  If you have already completed any of these items, please share that information with your care team to update in your medical record.  Health Maintenance Due   Topic Date Due     Heart Failure Action Plan  Never done     Kidney Microalbumin Urine Test  Never done     ANNUAL REVIEW OF HM ORDERS  Never done     Zoster (Shingles) Vaccine (1 of 2) Never done     Cholesterol Lab  01/06/2022     Your Health Risk Assessment indicates you feel you are not in good health    A healthy lifestyle helps keep the body fit and the mind alert. It helps protect you from disease, helps you fight disease, and helps prevent chronic disease (disease that doesn't go away) from getting worse. This is important as you get older and begin to notice twinges in muscles and joints and a decline in the strength and stamina you once took for granted. A healthy lifestyle includes good healthcare, good nutrition, weight control, recreation, and regular exercise. Avoid harmful substances and do what you can to keep safe. Another part of a healthy lifestyle is stay mentally active and socially involved.    Good healthcare     Have a wellness visit every year.     If you have new symptoms, let us know right away. Don't wait until the next checkup.     Take medicines exactly as prescribed and keep your medicines in a safe place. Tell us if your medicine causes problems.   Healthy diet and weight control     Eat 3 or 4 small, nutritious, low-fat, high-fiber meals a day. Include a variety of fruits, vegetables, and whole-grain foods.     Make sure you get enough calcium in your diet. Calcium, vitamin D, and exercise help prevent osteoporosis (bone thinning).     If you live alone, try eating with others when you can. That way you get a good meal and have company while you  eat it.     Try to keep a healthy weight. If you eat more calories than your body uses for energy, it will be stored as fat and you will gain weight.     Recreation   Recreation is not limited to sports and team events. It includes any activity that provides relaxation, interest, enjoyment, and exercise. Recreation provides an outlet for physical, mental, and social energy. It can give a sense of worth and achievement. It can help you stay healthy.    Mental Exercise and Social Involvement  Mental and emotional health is as important as physical health. Keep in touch with friends and family. Stay as active as possible. Continue to learn and challenge yourself.   Things you can do to stay mentally active are:    Learn something new, like a foreign language or musical instrument.     Play SCRABBLE or do crossword puzzles. If you cannot find people to play these games with you at home, you can play them with others on your computer through the Internet.     Join a games club--anything from card games to chess or checkers or lawn bowling.     Start a new hobby.     Go back to school.     Volunteer.     Read.   Keep up with world events.    Exercise for a Healthier Heart  You may wonder how you can improve the health of your heart. If you re thinking about exercise, you re on the right track. You don t need to become an athlete. But you do need a certain amount of brisk exercise to help strengthen your heart. If you have been diagnosed with a heart condition, your healthcare provider may advise exercise to help stabilize your condition. To help make exercise a habit, choose safe, fun activities.      Exercise with a friend. When activity is fun, you're more likely to stick with it.   Before you start  Check with your healthcare provider before starting an exercise program. This is especially important if you have not been active for a while. It's also important if you have a long-term (chronic) health problem such as  heart disease, diabetes, or obesity. Or if you are at high risk for having these problems.   Why exercise?  Exercising regularly offers many healthy rewards. It can help you do all of the following:     Improve your blood cholesterol level to help prevent further heart trouble    Lower your blood pressure to help prevent a stroke or heart attack    Control diabetes, or reduce your risk of getting this disease    Improve your heart and lung function    Reach and stay at a healthy weight    Make your muscles stronger so you can stay active    Prevent falls and fractures by slowing the loss of bone mass (osteoporosis)    Manage stress better    Reduce your blood pressure    Improve your sense of self and your body image  Exercise tips      Ease into your routine. Set small goals. Then build on them. If you are not sure what your activity level should be, talk with your healthcare provider first before starting an exercise routine.    Exercise on most days. Aim for a total of 150 minutes (2 hours and 30 minutes) or more of moderate-intensity aerobic activity each week. Or 75 minutes (1 hour and 15 minutes) or more of vigorous-intensity aerobic activity each week. Or try for a combination of both. Moderate activity means that you breathe heavier and your heart rate increases but you can still talk. Think about doing 40 minutes of moderate exercise, 3 to 4 times a week. For best results, activity should last for about 40 minutes to lower blood pressure and cholesterol. It's OK to work up to the 40-minute period over time. Examples of moderate-intensity activity are walking 1 mile in 15 minutes. Or doing 30 to 45 minutes of yard work.    Step up your daily activity level.  Along with your exercise program, try being more active the whole day. Walk instead of drive. Or park further away so that you take more steps each day. Do more household tasks or yard work. You may not be able to meet the advised mount of physical  activity. But doing some moderate- or vigorous-intensity aerobic activity can help reduce your risk for heart disease. Your healthcare provider can help you figure out what is best for you.    Choose 1 or more activities you enjoy.  Walking is one of the easiest things you can do. You can also try swimming, riding a bike, dancing, or taking an exercise class.    When to call your healthcare provider  Call your healthcare provider if you have any of these:     Chest pain or feel dizzy or lightheaded    Burning, tightness, pressure, or heaviness in your chest, neck, shoulders, back, or arms    Abnormal shortness of breath    More joint or muscle pain    A very fast or irregular heartbeat (palpitations)  Veronica last reviewed this educational content on 7/1/2019 2000-2021 [employer]. All rights reserved. This information is not intended as a substitute for professional medical care. Always follow your healthcare professional's instructions.          Understanding USDA MyPlate  The USDA has guidelines to help you make healthy food choices. These are called MyPlate. MyPlate shows the food groups that make up healthy meals using the image of a place setting. Before you eat, think about the healthiest choices for what to put on your plate or in your cup or bowl. To learn more about building a healthy plate, visit www.choosemyplate.gov.    The food groups    Fruits. Any fruit or 100% fruit juice counts as part of the Fruit Group. Fruits may be fresh, canned, frozen, or dried, and may be whole, cut-up, or pureed. Make 1/2 of your plate fruits and vegetables.    Vegetables. Any vegetable or 100% vegetable juice counts as a member of the Vegetable Group. Vegetables may be fresh, frozen, canned, or dried. They can be served raw or cooked and may be whole, cut-up, or mashed. Make 1/2 of your plate fruits and vegetables.    Grains. All foods made from grains are part of the Grains Group. These include wheat,  rice, oats, cornmeal, and barley. Grains are often used to make foods such as bread, pasta, oatmeal, cereal, tortillas, and grits. Grains should be no more than 1/4 of your plate. At least half of your grains should be whole grains.    Protein. This group includes meat, poultry, seafood, beans and peas, eggs, processed soy products (such as tofu), nuts (including nut butters), and seeds. Make protein choices no more than 1/4 of your plate. Meat and poultry choices should be lean or low fat.    Dairy. The Dairy Group includes all fluid milk products and foods made from milk that contain calcium, such as yogurt and cheese. (Foods that have little calcium, such as cream, butter, and cream cheese, are not part of this group.) Most dairy choices should be low-fat or fat-free.    Oils. Oils aren't a food group, but they do contain essential nutrients. However it's important to watch your intake of oils. These are fats that are liquid at room temperature. They include canola, corn, olive, soybean, vegetable, and sunflower oil. Foods that are mainly oil include mayonnaise, certain salad dressings, and soft margarines. You likely already get your daily oil allowance from the foods you eat.  Things to limit  Eating healthy also means limiting these things in your diet:       Salt (sodium). Many processed foods have a lot of sodium. To keep sodium intake down, eat fresh vegetables, meats, poultry, and seafood when possible. Purchase low-sodium, reduced-sodium, or no-salt-added food products at the store. And don't add salt to your meals at home. Instead, season them with herbs and spices such as dill, oregano, cumin, and paprika. Or try adding flavor with lemon or lime zest and juice.    Saturated fat. Saturated fats are most often found in animal products such as beef, pork, and chicken. They are often solid at room temperature, such as butter. To reduce your saturated fat intake, choose leaner cuts of meat and poultry. And  try healthier cooking methods such as grilling, broiling, roasting, or baking. For a simple lower-fat swap, use plain nonfat yogurt instead of mayonnaise when making potato salad or macaroni salad.    Added sugars. These are sugars added to foods. They are in foods such as ice cream, candy, soda, fruit drinks, sports drinks, energy drinks, cookies, pastries, jams, and syrups. Cut down on added sugars by sharing sweet treats with a family member or friend. You can also choose fruit for dessert, and drink water or other unsweetened beverages.     [employer] last reviewed this educational content on 6/1/2020 2000-2021 [employer]. All rights reserved. This information is not intended as a substitute for professional medical care. Always follow your healthcare professional's instructions.        Activities of Daily Living    Your Health Risk Assessment indicates you have difficulties with activities of daily living such as housework, bathing, preparing meals, taking medication, etc. Please make a follow up appointment for us to address this issue in more detail.    Signs of Hearing Loss      Hearing much better with one ear can be a sign of hearing loss.   Hearing loss is a problem shared by many people. In fact, it is one of the most common health problems, particularly as people age. Most people age 65 and older have some hearing loss. By age 80, almost everyone does. Hearing loss often occurs slowly over the years. So you may not realize your hearing has gotten worse.  Have your hearing checked  Call your healthcare provider if you:    Have to strain to hear normal conversation    Have to watch other people s faces very carefully to follow what they re saying    Need to ask people to repeat what they ve said    Often misunderstand what people are saying    Turn the volume of the television or radio up so high that others complain    Feel that people are mumbling when they re talking to you    Find that  the effort to hear leaves you feeling tired and irritated    Notice, when using the phone, that you hear better with one ear than the other  [employer] last reviewed this educational content on 1/1/2020 2000-2021 [employer]. All rights reserved. This information is not intended as a substitute for professional medical care. Always follow your healthcare professional's instructions.

## 2022-02-18 ASSESSMENT — PATIENT HEALTH QUESTIONNAIRE - PHQ9: SUM OF ALL RESPONSES TO PHQ QUESTIONS 1-9: 1

## 2022-02-18 NOTE — PROGRESS NOTES
Community Paramedic Program  Community Health Worker Outreach    UTC/Voicemail    Outreach attempted x 2.      Left message on patient's voicemail with call back information and requested return call.    CHW Follow-up Plan: will try to reach patient again in 1-2 business days.    Note:  Available 2/28 with CP2?    Referral source: Pt's PCP & CC CHW  Referral reason:   Reason(s) for visit: Recheck     Goals for visit(s): Decrease Wound     How often should patient be seen: Weekly     Preference on when patient should be seen: 3-7 Days     Comments   PCP: Nehal Freeman  Other info that would be helpful:

## 2022-02-21 NOTE — PROGRESS NOTES
"Community Paramedic Program  Community Health Worker Initial Outreach    Referral source: Pt's PCP & CC CHW  Referral reason:   Reason(s) for visit: Recheck     Goals for visit(s): Decrease Wound     How often should patient be seen: Weekly     Preference on when patient should be seen: 3-7 Days     Comments   PCP: Nehal Freeman  Other info that would be helpful:      Initial visit: Tuesday, March 1st / 2 pm / CP Aydee  (date/time/CP)    Additional information:   Spoke with pt. Has had home care in the past. Has wounds on legs--\"they aren't open sores\"--but said when she bumps them, she will get blood clots. Uses nebulizer in the mornings. Has lukemia and has next infusion 3/8. Pt is open to having a CP visit and assess. Has daughter and son in law who live nearby and family in [place].    Confirmed pt's home address and apartment number [id number]). Pt lives in the [facility]. Pt lives alone. She wrote down CP's name and phone number.        "

## 2022-03-01 ENCOUNTER — PATIENT OUTREACH (OUTPATIENT)
Dept: CARE COORDINATION | Facility: CLINIC | Age: 87
End: 2022-03-01
Payer: MEDICARE

## 2022-03-01 NOTE — PROGRESS NOTES
Community Paramedic Program  Community Health Worker Outreach    UTC/Voicemail    Outreach attempted x 1.      Left message on patient's voicemail with call back information and requested return call.    CHW Follow-up Plan: will try to reach patient again in 1-2 business days.    Left detailed msg letting pt know that CP3 is out sick today and won't be visiting as scheduled. Asked pt to call me back so I can assist with rescheduling for next week 3/9 or 3/10. Left my contact information.

## 2022-03-01 NOTE — PROGRESS NOTES
"Community Paramedic Program  Community Health Worker Initial Outreach    Referral source: Pt's PCP & CC CHW  Referral reason:   Reason(s) for visit: Recheck     Goals for visit(s): Decrease Wound     How often should patient be seen: Weekly     Preference on when patient should be seen: 3-7 Days     Comments   PCP: Nehal Freeman  Other info that would be helpful:      Initial visit: Rescheduled for Wednesday, March 9th / 2 pm / CP Aydee (date/time/CP)       Additional information:   Pt returned my call and asked for initial visit to be rescheduled for March 9th.     From 2/15 telephone encounter:  Spoke with pt. Has had home care in the past. Has wounds on legs--\"they aren't open sores\"--but said when she bumps them, she will get blood clots. Uses nebulizer in the mornings. Has lukemia and has next infusion 3/8. Pt is open to having a CP visit and assess. Has daughter and son in law who live nearby and family in [place].     Confirmed pt's home address and apartment number [id number]). Pt lives in the [facility]. Pt lives alone. She wrote down CP's name and phone number.            "

## 2022-03-02 DIAGNOSIS — I50.9 CHRONIC CONGESTIVE HEART FAILURE, UNSPECIFIED HEART FAILURE TYPE (H): ICD-10-CM

## 2022-03-02 RX ORDER — FUROSEMIDE 20 MG
20 TABLET ORAL DAILY
Qty: 90 TABLET | Refills: 3 | Status: SHIPPED | OUTPATIENT
Start: 2022-03-02 | End: 2022-03-22

## 2022-03-02 NOTE — TELEPHONE ENCOUNTER
Pending Prescriptions:                       Disp   Refills    furosemide (LASIX) 20 MG tablet [Pharmacy*90 tab*3            Sig: Take 1 tablet (20 mg) by mouth daily    Prescription approved per [facility] Refill Protocol.    Sanjana VILLANUEVA RN   [facility]

## 2022-03-16 ENCOUNTER — PATIENT OUTREACH (OUTPATIENT)
Dept: CARE COORDINATION | Facility: CLINIC | Age: 87
End: 2022-03-16
Payer: MEDICARE

## 2022-03-16 ENCOUNTER — APPOINTMENT (OUTPATIENT)
Dept: GENERAL RADIOLOGY | Facility: CLINIC | Age: 87
End: 2022-03-16
Attending: STUDENT IN AN ORGANIZED HEALTH CARE EDUCATION/TRAINING PROGRAM
Payer: MEDICARE

## 2022-03-16 ENCOUNTER — HOSPITAL ENCOUNTER (EMERGENCY)
Facility: CLINIC | Age: 87
Discharge: HOME | End: 2022-03-16
Attending: EMERGENCY MEDICINE | Admitting: EMERGENCY MEDICINE
Payer: MEDICARE

## 2022-03-16 VITALS
TEMPERATURE: 98.7 F | BODY MASS INDEX: 17.94 KG/M2 | HEART RATE: 62 BPM | OXYGEN SATURATION: 94 % | DIASTOLIC BLOOD PRESSURE: 83 MMHG | SYSTOLIC BLOOD PRESSURE: 115 MMHG | HEIGHT: 59 IN | WEIGHT: 89 LBS | RESPIRATION RATE: 12 BRPM

## 2022-03-16 DIAGNOSIS — J20.9 ACUTE EXACERBATION OF CHRONIC BRONCHITIS (H): ICD-10-CM

## 2022-03-16 DIAGNOSIS — J42 ACUTE EXACERBATION OF CHRONIC BRONCHITIS (H): ICD-10-CM

## 2022-03-16 LAB
ANION GAP SERPL CALCULATED.3IONS-SCNC: 5 MMOL/L (ref 3–14)
BASOPHILS # BLD MANUAL: 0 10E3/UL (ref 0–0.2)
BASOPHILS NFR BLD MANUAL: 0 %
BUN SERPL-MCNC: 13 MG/DL (ref 7–30)
CALCIUM SERPL-MCNC: 9.1 MG/DL (ref 8.5–10.1)
CHLORIDE BLD-SCNC: 99 MMOL/L (ref 94–109)
CO2 SERPL-SCNC: 31 MMOL/L (ref 20–32)
CREAT SERPL-MCNC: 0.96 MG/DL (ref 0.52–1.04)
ELLIPTOCYTES BLD QL SMEAR: SLIGHT
EOSINOPHIL # BLD MANUAL: 0 10E3/UL (ref 0–0.7)
EOSINOPHIL NFR BLD MANUAL: 0 %
ERYTHROCYTE [DISTWIDTH] IN BLOOD BY AUTOMATED COUNT: 17.1 % (ref 10–15)
FLUAV RNA SPEC QL NAA+PROBE: NEGATIVE
FLUBV RNA RESP QL NAA+PROBE: NEGATIVE
GFR SERPL CREATININE-BSD FRML MDRD: 56 ML/MIN/1.73M2
GLUCOSE BLD-MCNC: 106 MG/DL (ref 70–99)
HCT VFR BLD AUTO: 35 % (ref 35–47)
HGB BLD-MCNC: 10.3 G/DL (ref 11.7–15.7)
HOLD SPECIMEN: NORMAL
LYMPHOCYTES # BLD MANUAL: 135 10E3/UL (ref 0.8–5.3)
LYMPHOCYTES NFR BLD MANUAL: 91 %
MCH RBC QN AUTO: 28.2 PG (ref 26.5–33)
MCHC RBC AUTO-ENTMCNC: 29.4 G/DL (ref 31.5–36.5)
MCV RBC AUTO: 96 FL (ref 78–100)
MONOCYTES # BLD MANUAL: 10.4 10E3/UL (ref 0–1.3)
MONOCYTES NFR BLD MANUAL: 7 %
NEUTROPHILS # BLD MANUAL: 3 10E3/UL (ref 1.6–8.3)
NEUTROPHILS NFR BLD MANUAL: 2 %
NT-PROBNP SERPL-MCNC: 3909 PG/ML (ref 0–1800)
PLAT MORPH BLD: ABNORMAL
PLATELET # BLD AUTO: 115 10E3/UL (ref 150–450)
POTASSIUM BLD-SCNC: 4.6 MMOL/L (ref 3.4–5.3)
RBC # BLD AUTO: 3.65 10E6/UL (ref 3.8–5.2)
RBC MORPH BLD: ABNORMAL
SARS-COV-2 RNA RESP QL NAA+PROBE: NEGATIVE
SODIUM SERPL-SCNC: 135 MMOL/L (ref 133–144)
TROPONIN I SERPL HS-MCNC: 20 NG/L
WBC # BLD AUTO: 148.3 10E3/UL (ref 4–11)

## 2022-03-16 PROCEDURE — 36415 COLL VENOUS BLD VENIPUNCTURE: CPT | Performed by: EMERGENCY MEDICINE

## 2022-03-16 PROCEDURE — 99285 EMERGENCY DEPT VISIT HI MDM: CPT | Mod: 25

## 2022-03-16 PROCEDURE — 93005 ELECTROCARDIOGRAM TRACING: CPT

## 2022-03-16 PROCEDURE — 71046 X-RAY EXAM CHEST 2 VIEWS: CPT

## 2022-03-16 PROCEDURE — C9803 HOPD COVID-19 SPEC COLLECT: HCPCS

## 2022-03-16 PROCEDURE — 83880 ASSAY OF NATRIURETIC PEPTIDE: CPT | Performed by: STUDENT IN AN ORGANIZED HEALTH CARE EDUCATION/TRAINING PROGRAM

## 2022-03-16 PROCEDURE — 85027 COMPLETE CBC AUTOMATED: CPT | Performed by: EMERGENCY MEDICINE

## 2022-03-16 PROCEDURE — 87636 SARSCOV2 & INF A&B AMP PRB: CPT | Performed by: STUDENT IN AN ORGANIZED HEALTH CARE EDUCATION/TRAINING PROGRAM

## 2022-03-16 PROCEDURE — 80048 BASIC METABOLIC PNL TOTAL CA: CPT | Performed by: EMERGENCY MEDICINE

## 2022-03-16 PROCEDURE — 84484 ASSAY OF TROPONIN QUANT: CPT | Performed by: STUDENT IN AN ORGANIZED HEALTH CARE EDUCATION/TRAINING PROGRAM

## 2022-03-16 RX ORDER — AMOXICILLIN AND CLAVULANATE POTASSIUM 500; 125 MG/1; MG/1
1 TABLET, FILM COATED ORAL 2 TIMES DAILY
Qty: 14 TABLET | Refills: 0 | Status: SHIPPED | OUTPATIENT
Start: 2022-03-16 | End: 2022-03-22

## 2022-03-16 RX ORDER — PREDNISONE 20 MG/1
TABLET ORAL
Qty: 10 TABLET | Refills: 0 | Status: SHIPPED | OUTPATIENT
Start: 2022-03-16 | End: 2022-03-22

## 2022-03-16 ASSESSMENT — ENCOUNTER SYMPTOMS
HEMATURIA: 0
SORE THROAT: 1
ABDOMINAL PAIN: 0
COUGH: 1
PALPITATIONS: 0
LIGHT-HEADEDNESS: 0
BLOOD IN STOOL: 0
RHINORRHEA: 1
HEADACHES: 1
VOMITING: 0
SHORTNESS OF BREATH: 0
DYSURIA: 0
DIARRHEA: 0
NAUSEA: 0
MYALGIAS: 0
CONSTIPATION: 0
FEVER: 1

## 2022-03-16 ASSESSMENT — ACTIVITIES OF DAILY LIVING (ADL): DEPENDENT_IADLS:: CLEANING;SHOPPING;TRANSPORTATION

## 2022-03-16 NOTE — DISCHARGE INSTRUCTIONS
Please have a follow-up appointment with your primary care provider in 2 to 3 days.    Discharge Instructions  Bronchitis, Pneumonia, Bronchospasm    You were seen today for a chest infection or inflammation. If your provider decided this was due to a bacterial infection, you may need an antibiotic. Sometimes these are caused by a virus, and then an antibiotic will not help.     Generally, every Emergency Department visit should have a follow-up clinic visit with either a primary or a specialty clinic/provider. Please follow-up as instructed by your emergency provider today.    Return to the Emergency Department if:  Your breathing gets much worse.  You are very weak, or feel much more ill.  You develop new symptoms, such as chest pain.  You cough up blood.  You are vomiting (throwing up) enough that you cannot keep fluids or your medicine down.    What can I do to help myself?  Fill any prescriptions the provider gave you and take them right away--especially antibiotics. Be sure to finish the whole antibiotic prescription.  You may be given a prescription for an inhaler, which can help loosen tight air passages.  Use this as needed, but not more often than directed. Inhalers work much better when used with a spacer.   You may be given a prescription for a steroid to reduce inflammation. Used long-term, these can have side effects, but for short-term use they are safe. You may notice restlessness or increased appetite.      You may use non-prescription cough or cold medicines. Cough medicines may help, but don t make the cough go away completely.   Avoid smoke, because this can make your symptoms worse. If you smoke, this may be a good time to quit! Consider using nicotine lozenges, gum, or patches to reduce cravings.   If you have a fever, Tylenol  (acetaminophen), Motrin  (ibuprofen), or Advil  (ibuprofen) may help bring fever down and may help you feel more comfortable. Be sure to read and follow the package  directions, and ask your provider if you have questions.  Be sure to get your flu shot each year.  For certain ages, the pneumonia shot can help prevent pneumonia.  If you were given a prescription for medicine here today, be sure to read all of the information (including the package insert) that comes with your prescription.  This will include important information about the medicine, its side effects, and any warnings that you need to know about.  The pharmacist who fills the prescription can provide more information and answer questions you may have about the medicine.  If you have questions or concerns that the pharmacist cannot address, please call or return to the Emergency Department.     Remember that you can always come back to the Emergency Department if you are not able to see your regular provider in the amount of time listed above, if you get any new symptoms, or if there is anything that worries you.

## 2022-03-16 NOTE — ED PROVIDER NOTES
"Emergency Department Attending Supervision Note  3/16/2022  12:34 PM      I evaluated this patient in conjunction with Margarita Juarez PA-C.  I have participated in the care of the patient and personally performed key elements of the history, exam, and medical decision making.       Briefly, the patient presented with dyspnea and productive cough for about a week. No chest pain (except when coughing). Inhalers/nebs with some help. Dayquil/Nyquil/Mucinex did not help. Febrile 101 F yesterday.    On my exam,     Patient Vitals for the past 24 hrs:   BP Temp Temp src Pulse Resp SpO2 Height Weight   03/16/22 1544 -- -- -- -- -- 94 % -- --   03/16/22 1532 115/83 -- -- 62 12 -- -- --   03/16/22 1531 -- -- -- 62 26 -- -- --   03/16/22 1530 -- -- -- 61 19 -- -- --   03/16/22 1446 -- -- -- 60 12 -- -- --   03/16/22 1444 -- -- -- 63 13 -- -- --   03/16/22 1443 -- -- -- 59 11 -- -- --   03/16/22 1442 -- -- -- 61 12 -- -- --   03/16/22 1400 132/65 -- -- 60 16 95 % -- --   03/16/22 1300 -- -- -- 61 30 93 % -- --   03/16/22 1245 124/75 -- -- 62 11 93 % -- --   03/16/22 1203 137/63 98.7  F (37.1  C) Oral 71 20 91 % 1.499 m (4' 11\") 40.4 kg (89 lb)       Constitutional: Vital signs reviewed as above.   Head: No external signs of trauma noted.  Eyes: Pupils are equal, round, and reactive to light.   Neck: No JVD noted  Cardiovascular: Normal rate, regular rhythm and normal heart sounds.  No murmur heard. Equal B/L peripheral pulses.  Pulmonary/Chest: No respiratory distress. Diminished breath sounds B/L. Patient has no wheezes on my exam. Patient has no rales.   Gastrointestinal: Soft. There is no tenderness.   Musculoskeletal/Extremities: No edema noted. Normal tone.  Neurological: Patient is alert and oriented to person, place, and time.   Skin: Skin is warm and dry. There is no diaphoresis noted.   Psychiatric: The patient appears calm.    Results:    XR Chest 2 Views   Final Result   IMPRESSION: AP and lateral views of the chest " were obtained.   Cardiomediastinal silhouette is within normal limits. High lung   volumes, likely due to underlying COPD changes. Mild bibasilar   pulmonary opacities, likely atelectasis. No significant pleural   effusion or pneumothorax. Diffuse osteopenia with multilevel   degenerative changes of the spine.      RENETTA RILEY MD            SYSTEM ID:  [id number]          Labs Ordered and Resulted from Time of ED Arrival to Time of ED Departure   BASIC METABOLIC PANEL - Abnormal       Result Value    Sodium 135      Potassium 4.6      Chloride 99      Carbon Dioxide (CO2) 31      Anion Gap 5      Urea Nitrogen 13      Creatinine 0.96      Calcium 9.1      Glucose 106 (*)     GFR Estimate 56 (*)    CBC WITH PLATELETS AND DIFFERENTIAL - Abnormal    WBC Count 148.3 (*)     RBC Count 3.65 (*)     Hemoglobin 10.3 (*)     Hematocrit 35.0      MCV 96      MCH 28.2      MCHC 29.4 (*)     RDW 17.1 (*)     Platelet Count 115 (*)    NT PROBNP INPATIENT - Abnormal    N terminal Pro BNP Inpatient 3,909 (*)    DIFFERENTIAL - Abnormal    % Neutrophils 2      % Lymphocytes 91      % Monocytes 7      % Eosinophils 0      % Basophils 0      Absolute Neutrophils 3.0      Absolute Lymphocytes 135.0 (*)     Absolute Monocytes 10.4 (*)     Absolute Eosinophils 0.0      Absolute Basophils 0.0      RBC Morphology Confirmed RBC Indices      Platelet Assessment        Value: Automated Count Confirmed. Platelet morphology is normal.    Elliptocytes Slight (*)    INFLUENZA A/B & SARS-COV2 PCR MULTIPLEX - Normal    Influenza A PCR Negative      Influenza B PCR Negative      SARS CoV2 PCR Negative     TROPONIN I - Normal    Troponin I High Sensitivity 20         ED course:  ECG  ECG taken at 1224, ECG read at 1230  Sinus rhythm with first-degree AV block  Otherwise normal ECG    No significant change as compared to prior, dated 9/20/2021.  Rate 65 bpm. MI interval 222 ms. QRS duration 88 ms. QT/QTc 418/434 ms. P-R-T axes 86 59 70.        Medications - No data to display    ED Course as of 03/16/22 1612   Wed Mar 16, 2022   1316 Dr. Victoria discussed the case with Margarita Juarez PA-C. Discussed presentation, exam, ddx, plan.       1326 Dr. Victoria' evaluation.       Impression:    ICD-10-CM    1. Acute exacerbation of chronic bronchitis (H)  J20.9     J42          Shalom Victoria, DO   3/16/2022  [facility] EMERGENCY DEPT         Shalom Victoria,   03/16/22 1612

## 2022-03-16 NOTE — ED PROVIDER NOTES
History     Chief Complaint:  Shortness of Breath       HPI   Marie Kline is a 89 year old female with past medical history including CAD, COPD, CHF, and history of CLL with chronic and persistent leukocytosis, who presents with cough.  Patient reports that symptoms began about a week ago.  She has had a productive cough and rhinorrhea.  She reports chest pain with coughing.  Denies chest pain at rest.  Denies palpitations, shortness of breath, abdominal pain, urinary symptoms, nausea, and vomiting.  She states she has been taking her inhalers and nebulizer treatments with some relief.  She reports a fever of 101 yesterday.  Denies fevers today.  She has been taking DayQuil, NyQuil, and Mucinex for his symptoms with some relief.  She denies a change in her symptoms over the past couple of days and states that her daughter compelled her to come to the emergency department today rather than following up with her clinic.  She reports she uses 2 to 3 L of home oxygen at baseline.     Patient was hospitalized on 9/20/2021 for COVID pneumonia.  Patient is vaccinated and boosted against COVID-19.    ROS:  Review of Systems   Constitutional: Positive for fever.   HENT: Positive for rhinorrhea and sore throat. Negative for ear pain.    Respiratory: Positive for cough. Negative for shortness of breath.    Cardiovascular: Positive for chest pain (with coughing). Negative for palpitations.   Gastrointestinal: Negative for abdominal pain, blood in stool, constipation, diarrhea, nausea and vomiting.   Genitourinary: Negative for dysuria and hematuria.   Musculoskeletal: Negative for myalgias.   Skin: Negative for rash.   Neurological: Positive for headaches. Negative for syncope and light-headedness.   All other systems reviewed and are negative.      Allergies:  Diagnostic X-Ray Materials  Contrast Dye  Prolia [Denosumab]  Wound Dressing Adhesive     Medications:    amoxicillin-clavulanate (AUGMENTIN) 500-125 MG  tablet  predniSONE (DELTASONE) 20 MG tablet  ACE/ARB/ARNI NOT PRESCRIBED (INTENTIONAL)  albuterol (PROAIR HFA/PROVENTIL HFA/VENTOLIN HFA) 108 (90 Base) MCG/ACT inhaler  aspirin 81 MG EC tablet  budesonide (PULMICORT) 0.5 MG/2ML neb solution  ferrous sulfate (IRON) 325 (65 FE) MG tablet  fluticasone (FLONASE) 50 MCG/ACT nasal spray  fluticasone-salmeterol (ADVAIR) 500-50 MCG/DOSE inhaler  furosemide (LASIX) 20 MG tablet  Immune Globulin, Human, (OCTAGAM) 5 GM/100ML SOLN  ipratropium - albuterol 0.5 mg/2.5 mg/3 mL (DUONEB) 0.5-2.5 (3) MG/3ML neb solution  levothyroxine (SYNTHROID/LEVOTHROID) 75 MCG tablet  loperamide (IMODIUM) 2 MG capsule  metoprolol succinate ER (TOPROL-XL) 25 MG 24 hr tablet  Multiple Vitamins-Minerals (MULTIVITAMIN ADULT) CHEW  sertraline (ZOLOFT) 50 MG tablet  tiotropium (SPIRIVA) 18 MCG inhaled capsule  traZODone (DESYREL) 50 MG tablet      Past Medical History:    Arthritis  Bladder cancer  CLL  COPD  Hypertension  Hypothyroidism  NSTEMI  LESLY  CKD  CHF  Lumbar DDD    Past Surgical History:    Past Surgical History:   Procedure Laterality Date     BIOPSY LYMPH NODE INGUINAL Right 10/23/2018    Procedure: excsional biopsy right inguinal lymph node;  Surgeon: Reji Connors MD;  Location: [facility] OR     BLADDER SURGERY       CORONARY ANGIOGRAPHY ADULT ORDER  10/2014    minimal CAD     CV LEFT HEART CATH N/A 12/11/2018    Procedure: Left Heart Cath;  Surgeon: Sadiq Carrasco MD;  Location:  HEART CARDIAC CATH LAB     CYSTOSCOPY       CYSTOSCOPY, BIOPSY BLADDER, COMBINED N/A 2/9/2016    Procedure: COMBINED CYSTOSCOPY, BIOPSY BLADDER;  Surgeon: Kar Nuñez MD;  Location:  OR     CYSTOSCOPY, TRANSURETHRAL RESECTION (TUR) TUMOR BLADDER, COMBINED N/A 2/9/2016    Procedure: COMBINED CYSTOSCOPY, TRANSURETHRAL RESECTION (TUR) TUMOR BLADDER;  Surgeon: Kar Nuñez MD;  Location:  OR     ESOPHAGOSCOPY, GASTROSCOPY, DUODENOSCOPY (EGD), COMBINED N/A 6/22/2016    Procedure: COMBINED  "ESOPHAGOSCOPY, GASTROSCOPY, DUODENOSCOPY (EGD);  Surgeon: Freddie Diez MD;  Location: [facility] GI     OPEN REDUCTION INTERNAL FIXATION HIP BIPOLAR Left 11/19/2018    Procedure: Left bipolar hemiarthroplasty;  Surgeon: Scar Reynolds MD;  Location: [facility] OR        Family History:    family history includes Cancer in her father; Cerebrovascular Disease in her mother.    Social History:   reports that she quit smoking about 26 years ago. Her smoking use included cigarettes. She has never used smokeless tobacco. She reports that she does not drink alcohol and does not use drugs.  PCP: [facility]     Physical Exam     Patient Vitals for the past 24 hrs:   BP Temp Temp src Pulse Resp SpO2 Height Weight   03/16/22 1446 -- -- -- 60 12 -- -- --   03/16/22 1444 -- -- -- 63 13 -- -- --   03/16/22 1443 -- -- -- 59 11 -- -- --   03/16/22 1442 -- -- -- 61 12 -- -- --   03/16/22 1400 132/65 -- -- 60 16 95 % -- --   03/16/22 1300 -- -- -- 61 30 93 % -- --   03/16/22 1245 124/75 -- -- 62 11 93 % -- --   03/16/22 1203 137/63 98.7  F (37.1  C) Oral 71 20 91 % 1.499 m (4' 11\") 40.4 kg (89 lb)        Physical Exam  Vitals: Reviewed, as above.   General: Alert and oriented, in mild distress. Resting on bed. Appears cachectic.  Skin: Warm and well-perfused. No rashes, lesions, or erythema.   HEENT: Head: Normocephalic, atraumatic. Facial features symmetric. Eyes: Conjunctiva pink, sclera white. EOMs grossly intact. Ears: Auricles without lesion or edema. EACs with some wax. TMs visualized bilaterally with no erythema or effusion. Mouth and throat: Lips are moist with no chapping, lesions, or edema, Buccal mucosa is pink and moist without lesions. Oropharyngeal mucosa is pink and moist with no erythema, edema, or exudate.   Neck: Supple with no lymphadenopathy. Full ROM.   Pulmonary: Chest wall expansion symmetric with no increased work of breathing. Lungs with diminished lung sounds and soft rhonchi on " auscultation bilaterally.   Cardiovascular: Heart RRR with no murmurs, rubs, or gallops. 2+ radial, tibialis posterior, and dorsalis pedis pulses bilaterally. No peripheral edema.  Abdominal: No hernias, scars, lesions, striae, or distension. Bowel sounds present and physiologic. Abdomen is soft and nontender to light and deep palpation in all 4 quadrants with no guarding or rebound. No masses or organomegaly.   Musculoskeletal: Moves all extremities spontaneously.  Psych: Affect appropriate.  Answers questions appropriately. Patient appears calm.      Emergency Department Course   ECG:  ECG taken at 1224, ECG read at 1230  Sinus rhythm with first-degree AV block  Otherwise normal ECG     No significant change as compared to prior, dated 9/20/2021.  Rate 65 bpm. NE interval 222 ms. QRS duration 88 ms. QT/QTc 418/434 ms. P-R-T axes 86 59 70.     Imaging:  XR Chest 2 Views   Final Result   IMPRESSION: AP and lateral views of the chest were obtained.   Cardiomediastinal silhouette is within normal limits. High lung   volumes, likely due to underlying COPD changes. Mild bibasilar   pulmonary opacities, likely atelectasis. No significant pleural   effusion or pneumothorax. Diffuse osteopenia with multilevel   degenerative changes of the spine.      RENETTA RILEY MD            SYSTEM ID:  [id number]         Report per radiology    Laboratory:  Labs Ordered and Resulted from Time of ED Arrival to Time of ED Departure   BASIC METABOLIC PANEL - Abnormal       Result Value    Sodium 135      Potassium 4.6      Chloride 99      Carbon Dioxide (CO2) 31      Anion Gap 5      Urea Nitrogen 13      Creatinine 0.96      Calcium 9.1      Glucose 106 (*)     GFR Estimate 56 (*)    CBC WITH PLATELETS AND DIFFERENTIAL - Abnormal    WBC Count 148.3 (*)     RBC Count 3.65 (*)     Hemoglobin 10.3 (*)     Hematocrit 35.0      MCV 96      MCH 28.2      MCHC 29.4 (*)     RDW 17.1 (*)     Platelet Count 115 (*)    NT PROBNP INPATIENT -  Abnormal    N terminal Pro BNP Inpatient 3,909 (*)    DIFFERENTIAL - Abnormal    % Neutrophils 2      % Lymphocytes 91      % Monocytes 7      % Eosinophils 0      % Basophils 0      Absolute Neutrophils 3.0      Absolute Lymphocytes 135.0 (*)     Absolute Monocytes 10.4 (*)     Absolute Eosinophils 0.0      Absolute Basophils 0.0      RBC Morphology Confirmed RBC Indices      Platelet Assessment        Value: Automated Count Confirmed. Platelet morphology is normal.    Elliptocytes Slight (*)    INFLUENZA A/B & SARS-COV2 PCR MULTIPLEX - Normal    Influenza A PCR Negative      Influenza B PCR Negative      SARS CoV2 PCR Negative     TROPONIN I - Normal    Troponin I High Sensitivity 20          Emergency Department Course:     Reviewed:  I reviewed nursing notes, vitals and past medical history    Assessments:  1240 I obtained history and examined the patient as noted above.   1508 I rechecked the patient and explained findings.   1532 I rechecked the patient and explained findings.     Consults:   1525 DW Dr. Ivory from [facility] regarding patient's presentation, findings, and plan of care. She agreed with outpatient management with close PCP follow up in 2-3 days.     Interventions:  Medications - No data to display     Disposition:  The patient was discharged to home.     Impression & Plan    CMS Diagnoses: None    Covid-19  Marie Kline was evaluated during a global COVID-19 pandemic, which necessitated consideration that the patient might be at risk for infection with the SARS-CoV-2 virus that causes COVID-19.   Applicable protocols for evaluation were followed during the patient's care.   COVID-19 was considered as part of the patient's evaluation. The plan for testing is:  a test was obtained during this visit.      Medical Decision Making:  Marie is an 89-year-old female past medical history including CAD,COPD, CHF, and history of CLL with chronic and persistent leukocytosis, who presents with cough.   Please see HPI and physical exam for full details.  Differential diagnosis included cough, pneumonia, influenza, COPD exacerbation, CHF exacerbation, bronchitis, ACS, etc.  Patient has a reassuring physical exam with no wheezing on auscultation.  COVID and flu are negative.  Chest x-ray negative for pneumonia.  Laboratory evaluation revealed leukocytosis to 148,000, and according to chart review patient's baseline is 50,000-100,000.  This likely represents a COPD exacerbation.  Cardiac work-up was unremarkable, including EKG nonconcerning for ischemia, and troponin is 20, and according to [facility] pathway for high-sensitivity troponin, after a week of symptoms, patient can be ruled out for myocardial injury. Although patient's BNP is elevated, patient is not clinically in heart failure, and she can continue with her current outpatient dose of Lasix.      I reviewed GOLD criteria for COPD exacerbation therapy.  Due to patient's fever, change in her cough, and increased sputum production, she was treated with Augmentin 500 mg twice daily.  This dose was selected due to patient's creatinine clearance being too low to tolerate recommended dose of 875 mg twice daily.  I also prescribed a prednisone burst according to GOLD criteria.      I discussed the patient's findings with her oncologist, and she agreed with the plan for outpatient management and was comfortable with her being discharged to home with close PCP follow-up in 2 to 3 days.  I explained this to the patient and her daughter.  They verbalized understanding.  All questions were answered.  Patient discharged to home in stable condition.      Critical Care time:  was 0 minutes for this patient excluding procedures.    Diagnosis:    ICD-10-CM    1. Acute exacerbation of chronic bronchitis (H)  J20.9     J42         Discharge Medications:  New Prescriptions    AMOXICILLIN-CLAVULANATE (AUGMENTIN) 500-125 MG TABLET    Take 1 tablet by mouth 2 times daily  for 7 days    PREDNISONE (DELTASONE) 20 MG TABLET    Take two tablets (= 40mg) each day for 5 (five) days         Margarita Juarez PA-C  03/16/22 1537       Shalom Victoria DO  03/16/22 9625

## 2022-03-16 NOTE — PROGRESS NOTES
Care Coordination Clinician Chart Review     Situation: Patient chart reviewed by care coordinator.?     Background: Initial assessment and enrollment to Care Coordination was 5/2019.?? Patient centered goals were developed with participation from patient.? Lead CC handed patient off to CHW for continued outreach every 30 days.??     Assessment: Per chart review, patient outreach completed by CC CHW on 2/17/2022.? Patient is actively working to accomplish goal(s).? Patient's goal(s) remain(s) appropriate at this time.? Patient is not due for updated Plan of Care.? Annual assessment will be due 9/2022.     Goals        Patient Stated       4. Medical (pt-stated)       Goal Statement: I want my skin tears to heal within 18 months.  Date Goal set: 9.10.2020 (extended 8/31/2021, 10/1/2021, 12/7/2021). Extended 3/16/2022  Barriers: Medical conditions impacting fragility, many opportunities for injury.  Strengths: Strong support system, recognition of need, coordination with PCP office to manage cares and needs.  Date to Achieve By: 9.16.2022  Patient expressed understanding of goal: Pt reports understanding and denies any additional questions or concerns at this times. SW CC engaged in AIDET communication during encounter.    Action steps to achieve this goal:  1. I will manage wound care until PCP appointment.  2. I will follow-up with primary care regarding plan of care.  3. I will follow wound care recommendations until healed.          ??     Plan/Recommendations: The patient will continue working with Care Coordination to achieve above goal(s).? CHW will involve Lead CC as needed or if patient is ready to move to maintenance.? Lead CC will continue to monitor CHW s monthly outreaches and progress to goal(s) every 6 weeks.?     Plan of Care updated and sent to patient: No    YESSI Vela  Clinic Care Coordinator  Ph. [phone]  [email or web address]

## 2022-03-16 NOTE — ED TRIAGE NOTES
Pt presents for evaluation of shortness of breath since last Saturday. Associated productive cough, pain in the chest with coughing, fever, headache and weakness. Pt is vaccinated against COVID and influenza. Hx of COPD, on 2 LPM via NC at baseline and leukemia.

## 2022-03-17 ENCOUNTER — PATIENT OUTREACH (OUTPATIENT)
Dept: OTHER | Facility: CLINIC | Age: 87
End: 2022-03-17
Payer: MEDICARE

## 2022-03-17 LAB
ATRIAL RATE - MUSE: 65 BPM
DIASTOLIC BLOOD PRESSURE - MUSE: NORMAL MMHG
INTERPRETATION ECG - MUSE: NORMAL
P AXIS - MUSE: 86 DEGREES
PR INTERVAL - MUSE: 222 MS
QRS DURATION - MUSE: 88 MS
QT - MUSE: 418 MS
QTC - MUSE: 434 MS
R AXIS - MUSE: 59 DEGREES
SYSTOLIC BLOOD PRESSURE - MUSE: NORMAL MMHG
T AXIS - MUSE: 70 DEGREES
VENTRICULAR RATE- MUSE: 65 BPM

## 2022-03-18 ENCOUNTER — PATIENT OUTREACH (OUTPATIENT)
Dept: CARE COORDINATION | Facility: CLINIC | Age: 87
End: 2022-03-18
Payer: MEDICARE

## 2022-03-18 DIAGNOSIS — F41.1 GAD (GENERALIZED ANXIETY DISORDER): ICD-10-CM

## 2022-03-18 ASSESSMENT — ACTIVITIES OF DAILY LIVING (ADL): DEPENDENT_IADLS:: CLEANING;SHOPPING;TRANSPORTATION

## 2022-03-18 NOTE — PROGRESS NOTES
Marie reached out to the Community Paramedic to reschedule the appointment that was canceled yesterday due to her going to the ER.  She wanted me to come over today because she needs a bath and feels to weak to do it herself.  I explained to her what the Community Paramedic could offer and she then declined the appointment.  She would like to look into a HHA if possible.  I told her I would pass this along to her care team and she should also discuss this at her upcoming ER F/U at the clinic coming up which she said she will.      No further outreach will be made at this time.  Please place a new referral if needed.     Aydee AVILAP, CP  Community Paramedic  Phone number [phone]  [email or web address]

## 2022-03-18 NOTE — PROGRESS NOTES
Clinic Care Coordination Contact  [facility]/Voicemail    Referral Source: IP Report  Notification received from Community Paramedic that Pt is experiencing weakness and is interested in bathing assistance.     Clinical Data: Care Coordinator Outreach  Outreach attempted x 1.  Left message on patient's voicemail with call back information and requested return call.  Plan: Care Coordinator will try to reach patient again in 1-2 business days.      YESSI Vela  Clinic Care Coordinator  Ph. [phone]  [email or web address]

## 2022-03-18 NOTE — TELEPHONE ENCOUNTER
Voicemail left for Patient to address interest in bathing assistance. Will attempt to reach again in 1-2 business days.     YESSI Vela  Clinic Care Coordinator  Ph. [phone]  [email or web address]

## 2022-03-18 NOTE — LETTER
[facility] CARE COORDINATION  [address] [place] [address]  March 21, 2022    Marie Kline  [address] [place]  [place] [id number]      Dear Marie,    I have been attempting to reach you since our last contact. I would like to continue to work with you and provide any additional support you may need on achieving your health care related goals. I would appreciate if you would give me a call at [phone] to let me know if you would like to continue working together. I know that there are many things that can affect our ability to communicate and I hope we can continue to work together.    All of us at the [facility] are invested in your health and are here to assist you in meeting your goals.     Sincerely,    YESSI Pichardo

## 2022-03-18 NOTE — TELEPHONE ENCOUNTER
Routing refill request to provider for review/approval because:  A break in medication  Dewayne VILLANUEVA RN, BSN

## 2022-03-21 NOTE — PROGRESS NOTES
Clinic Care Coordination Contact  [facility]/Voicemail    Referral Source: IP Report  Clinical Data: Care Coordinator Outreach  Outreach attempted x 2.  Left message on patient's voicemail with call back information and requested return call.  Plan: Care Coordinator will send unable to contact letter with care coordinator contact information via [employer]. Care Coordinator will try to reach patient again in 10 business days.      YESSI Vela  Clinic Care Coordinator  Ph. [phone]  [email or web address]

## 2022-03-22 ENCOUNTER — OFFICE VISIT (OUTPATIENT)
Dept: INTERNAL MEDICINE | Facility: CLINIC | Age: 87
End: 2022-03-22
Payer: MEDICARE

## 2022-03-22 VITALS
DIASTOLIC BLOOD PRESSURE: 66 MMHG | OXYGEN SATURATION: 97 % | WEIGHT: 88 LBS | RESPIRATION RATE: 16 BRPM | BODY MASS INDEX: 17.74 KG/M2 | SYSTOLIC BLOOD PRESSURE: 140 MMHG | TEMPERATURE: 97.9 F | HEIGHT: 59 IN | HEART RATE: 61 BPM

## 2022-03-22 DIAGNOSIS — I42.8 NONISCHEMIC CARDIOMYOPATHY (H): ICD-10-CM

## 2022-03-22 DIAGNOSIS — C91.10 CLL (CHRONIC LYMPHOCYTIC LEUKEMIA) (H): ICD-10-CM

## 2022-03-22 DIAGNOSIS — J44.1 COPD EXACERBATION (H): Primary | ICD-10-CM

## 2022-03-22 DIAGNOSIS — R19.7 DIARRHEA, UNSPECIFIED TYPE: ICD-10-CM

## 2022-03-22 DIAGNOSIS — I50.9 CHRONIC CONGESTIVE HEART FAILURE, UNSPECIFIED HEART FAILURE TYPE (H): ICD-10-CM

## 2022-03-22 PROCEDURE — 99214 OFFICE O/P EST MOD 30 MIN: CPT | Performed by: FAMILY MEDICINE

## 2022-03-22 RX ORDER — FUROSEMIDE 20 MG
TABLET ORAL
Qty: 90 TABLET | Refills: 3 | COMMUNITY
Start: 2022-03-22 | End: 2022-08-06

## 2022-03-22 RX ORDER — LOPERAMIDE HYDROCHLORIDE 2 MG/1
2 TABLET ORAL 3 TIMES DAILY PRN
Qty: 40 TABLET | Refills: 1 | Status: SHIPPED | OUTPATIENT
Start: 2022-03-22 | End: 2022-06-09

## 2022-03-22 RX ORDER — AZITHROMYCIN 250 MG/1
TABLET, FILM COATED ORAL
Qty: 6 TABLET | Refills: 0 | Status: SHIPPED | OUTPATIENT
Start: 2022-03-22 | End: 2022-03-27

## 2022-03-22 NOTE — PATIENT INSTRUCTIONS
Take prescribed medication (antibiotic, Z Tyson) as directed.    Note change of Lasix dose.    Imodium if needed for diarrhea.    Use Nebulizer.    Note - white count was 148,000.

## 2022-03-22 NOTE — PROGRESS NOTES
(J44.1) COPD exacerbation (H)  (primary encounter diagnosis)  Comment:   A second antibiotic following Augmentin.  Plan: azithromycin (ZITHROMAX) 250 MG tablet        I asked her to use her nebulizer as directed which I believe is to use 2-3 times daily.      (C91.10) CLL (chronic lymphocytic leukemia) (HCC)  Comment:   Noted WBC to be 148,000 which is above some of the previous readings.  Plan: She does have follow-up with oncology.      (I42.8) Nonischemic cardiomyopathy (H)  Comment:   I note that patient's BNP is elevated from previous.  No overt failure on chest x-ray although a CT scan performed in October 2021 was suggestive of fluid overload.  Plan:   I am going to raise her furosemide dose slightly taking 40 mg on Monday and Friday and then 20 mg other days.  See if this is helpful.      (R19.7) Diarrhea, unspecified type  Comment:   Got some diarrhea on Augmentin, hopefully subsiding.  Occasional use of Imodium should be okay.  Plan: loperamide (IMODIUM A-D) 2 MG tablet            (I50.9) Chronic congestive heart failure, unspecified heart failure type (H)  Comment: Adjusted dosage of furosemide as noted above.  Plan: furosemide (LASIX) 20 MG tablet                Subjective     Follow-up COPD exacerbation.    [facility]     ED/UC Followup:    Facility:  [facility]  Date of visit: 03/16/22  Reason for visit: cough  Current Status: diarrhea, weakness     This is in follow-up of an emergency room visit on 3-16-22    Work-up at that time did not show a lung infiltrate but patient was felt to have an exacerbation of COPD and was discharged on Augmentin.    She developed diarrhea on about day 5 of the Augmentin.  No bloody diarrhea.  Still has some cough.  No fever.    WBC taken during the ER visit was 148,000.  She is on a chemotherapy infusion through [facility] which she obtains every 5 weeks.          Review of Systems     No fevers or chills.  No chest pain.  No abdominal pain or nausea.  No lower extremity  "edema.  No focal weakness.      Objective    BP (!) 140/66 (BP Location: Right arm, Patient Position: Chair, Cuff Size: Adult Regular)   Pulse 61   Temp 97.9  F (36.6  C) (Oral)   Resp 16   Ht 1.499 m (4' 11\")   Wt 39.9 kg (88 lb)   LMP  (LMP Unknown)   SpO2 97%   Breastfeeding No   BMI 17.77 kg/m    Body mass index is 17.77 kg/m .  Physical Exam     Very thin, fragile appearing woman.  Uses oxygen and walks with the aid of a walker.  She is alert and mentally sharp.  HEENT atraumatic.  Neck without masses.  Lungs a few basilar rales bilaterally and prolonged expiration.  Cardiac RSR.  Rate normal.  Abdomen nondistended.  Extremities no edema.        "

## 2022-03-23 ENCOUNTER — PATIENT OUTREACH (OUTPATIENT)
Dept: OTHER | Facility: CLINIC | Age: 87
End: 2022-03-23
Payer: MEDICARE

## 2022-03-23 NOTE — PROGRESS NOTES
Marie reached out to ask about a possible HHA.  I gave her Rogelio's phone number to reach out tomorrow for more information to which she verbalized understanding.    Aydee Rodriguez NRP, CP  Community Paramedic  Phone number [phone]  [email or web address]

## 2022-03-24 ENCOUNTER — PATIENT OUTREACH (OUTPATIENT)
Dept: NURSING | Facility: CLINIC | Age: 87
End: 2022-03-24
Payer: MEDICARE

## 2022-03-24 ASSESSMENT — ACTIVITIES OF DAILY LIVING (ADL): DEPENDENT_IADLS:: CLEANING;SHOPPING;TRANSPORTATION

## 2022-03-24 NOTE — TELEPHONE ENCOUNTER
I will wait for her call. I did outreach X2 last week, but was unable to connect with her. Thank you!    YESSI Vela  Clinic Care Coordinator  Ph. [phone]  [email or web address]

## 2022-03-24 NOTE — PROGRESS NOTES
Clinic Care Coordination Contact    Follow Up Progress Note      Assessment: Voicemail received from Patient requesting a call back. Call placed to Patient. She reports that she has been feeling weaker recently, and is worried about frailty during showers. Pt would like assistance with showers/bathing. Pt reports that she currently lives in the Independent Living at [facility], so does not receive care services.     Care Gaps:    Health Maintenance Due   Topic Date Due     HF ACTION PLAN  Never done     MICROALBUMIN  Never done     ZOSTER IMMUNIZATION (1 of 2) Never done     LIPID  01/06/2022     COVID-19 Vaccine (4 - Booster for Moderna series) 02/16/2022     TSH W/FREE T4 REFLEX  03/24/2022         Goals addressed this encounter:   Goals Addressed                    This Visit's Progress       Patient Stated       5. Medical (pt-stated)   On track      Goal Statement: I want to start receiving home care services within one month.   Date Goal set: 3/24/2022  Barriers: Navigating referral and eligibility for insurance coverage.   Strengths: Recognition of need, Care team support, strong support system.   Date to Achieve By: 4/24/2022  Patient expressed understanding of goal: Pt reports understanding and denies any additional questions or concerns at this times. SW CC engaged in AIDET communication during encounter.    Action steps to achieve this goal:  1. I will be referred to home care.   2. I will be contacted to establish services.  3. I will establish home care and receive assistance.              Intervention/Education provided during outreach: We reviewed that a bathing aide would not be covered under insurance as a standalone service, and must be paired with a skilled service, like nursing or PT in order for home care to be covered by Medicare. Pt reports that she would benefit from nursing, and that Dr. Huang also recommended home nurse visits at her office visit earlier this week.     We reviewed that home  care is a short-term service, so long-term bathing assistance would be an out of pocket cost. Pt states that she does not want to pay privately, and only wants services covered by health insurance at this time. [facility] CC inquired if Pt has a long-term-care insurance plan. Pt reports that she isn't sure, and requests that JOS[facility] CC contact her dtr, Margaret, to clarify.    Call placed to Margaret, who reports that Pt does not have a long-term-care insurance plan. We reviewed that we will pursue a home care order for short-term services. Margaret is in agreement with this plan, and states that if Pt is still needing assistance after the end of home care services, that she will discuss senior care care with Patient.      Outreach Frequency: monthly    Plan:   In-basket message routed to Dr. Huang requesting home care orders in follow-up to Pt's recent office visit on 3/22. If determined appropriate, Pt may benefit from home PT/OT/RN/HHA. J[facility] CC will remain available for CC needs and will complete a review in 2-3 business days to ensure that home care orders are placed and being processed. [facility] CC will remain available for CC needs.    YESSI Vela  Clinic Care Coordinator  Ph. [phone]  [email or web address]

## 2022-03-25 DIAGNOSIS — J44.1 COPD EXACERBATION (H): ICD-10-CM

## 2022-03-25 DIAGNOSIS — C91.10 CLL (CHRONIC LYMPHOCYTIC LEUKEMIA) (H): Primary | ICD-10-CM

## 2022-03-25 DIAGNOSIS — I42.8 NONISCHEMIC CARDIOMYOPATHY (H): ICD-10-CM

## 2022-03-28 ENCOUNTER — TELEPHONE (OUTPATIENT)
Dept: INTERNAL MEDICINE | Facility: CLINIC | Age: 87
End: 2022-03-28
Payer: MEDICARE

## 2022-03-28 NOTE — TELEPHONE ENCOUNTER
[address] [facility] [address] [address] [phone]  Hematology Ambulatory Consult  Meghana Berg, 1957, [id number]  2/11/2020    Assessment/Plan:  1  Acute deep vein thrombosis (DVT) of distal vein of left lower extremity and other pulmonary embolism without acute cor pulmonale, unspecified chronicity ([facility] [address] )    This is a 26-year-old male with history of the above  Patient has had 1 episode of unprovoked and symptomatic DVT and PE  Patient is currently on Xarelto at the appropriate dosing of 20 mg a day  Patient was advised to continue this dose for at least 6 months  Presently , patient is up-to-date with age-related screenings  Colonoscopy is due but is being delayed until anticoagulation can be held  Platelet count is within normal limits and patient is asymptomatic of occult malignancies such as weight loss  At this time, I have asked the patient to follow up with the below workup for hypercoagulable state, as there is no provoking events or factors prior to the development of the above  Depending on the workup, length of anticoagulation therapy will be clarified  I briefly discussed with the patient that if the below blood work is negative that additional period of anticoagulation such as a year following full-dose anticoagulation may be recommended in order to further reduce risk of recurrence of pulmonary embolism/DVT  Patient will follow-up in 2 weeks with the blood work and we will have a more in-depth conversation regarding length of therapy  Patient voiced understanding and agreement to the above  - Factor 5 leiden; Future  - Prothrombin gene mutation; Future  - Lupus anticoagulant; Future  - Cardiolipin antibody; Future  - Beta-2 glycoprotein antibodies; Future  - Antithrombin III Activity; Future  - Protein C activity; Future  - Protein S activity;  Future  - Protein S Antigen, Total & Free; Marie with [facility] calls requesting verbal order for delay in started of care until 4/1/22. Dr. Huang ordered home care, routed to PCP-Dr. Dumont to review if okay for delay.     Marcia Rg RN  [facility]     Future    Barrier(s) to care: None  The patient is able to self care     -------------------------------------------------------------------------------------------------------    Chief Complaint   Patient presents with    Consult     Referring provider: Enrrique Monet MD    History of present illness: This is a 66-year-old male with past medical history of hypertension and hyperlipidemia with past medical history of left lower extremity DVT and nonocclusive pulmonary emboli within the right lower lobe and lingular segmental branches found on 11/18/19  Patient notes history started approximately 1 week prior  Patient noted that he had some pain in his lower extremities bilaterally  Patient had noted that he went home that night in felt some shortness of breath but this resolved spontaneously  Patient went to the emergency room in November secondary to continued chest pain and shortness of breath  Patient was worried about having a heart attack  Patient also had lower extremity pain  Ultrasound demonstrated an acute occlusive deep vein thrombosis of the peroneal veins  Patient was started on Xarelto and was discharged  Patient denies provoking feature including recent travel, trauma, change in activity level and family history  In January, patient went back to the emergency room secondary to long flight, with associated chest pain worried about recurrence of pulmonary embolism  D-dimer was negative and chest x-ray was completed and was also negative  Patient notes that he has been compliant on Xarelto  He notes that he has a rash on his upper back with associated pruritus  Patient has been using topical agents to help with this  Patient denies other side effects to Xarelto such as bleeding  Review of Systems   Constitutional: Negative for activity change, appetite change, fatigue and fever  HENT: Negative for nosebleeds      Respiratory: Negative for cough, choking and shortness of breath  Cardiovascular: Negative for chest pain, palpitations and leg swelling  Gastrointestinal: Negative for abdominal distention, abdominal pain, anal bleeding, blood in stool, constipation, diarrhea, nausea and vomiting  Endocrine: Negative for cold intolerance  Genitourinary: Negative for hematuria  Musculoskeletal: Negative for myalgias  Skin: Negative for color change, pallor and rash  Allergic/Immunologic: Negative for immunocompromised state  Neurological: Negative for headaches  Hematological: Negative for adenopathy  Does not bruise/bleed easily  All other systems reviewed and are negative  Patient Active Problem List   Diagnosis    Pulmonary emboli (HCC)    Acute deep vein thrombosis (DVT) of left lower extremity (HCC)    Hypertension    Hyperlipidemia    Other chest pain    Obstructive sleep apnea    Dyspnea on exertion    Swelling of lower extremity     Past Medical History:   Diagnosis Date    DVT (deep venous thrombosis) (HCC)     LLE    Pulmonary emboli (HCC)      Past Surgical History:   Procedure Laterality Date    ROTATOR CUFF REPAIR      SINUS SURGERY       No family history on file      Social History     Socioeconomic History    Marital status: /Civil Union     Spouse name: None    Number of children: None    Years of education: None    Highest education level: None   Occupational History    None   Social Needs    Financial resource strain: None    Food insecurity:     Worry: None     Inability: None    Transportation needs:     Medical: None     Non-medical: None   Tobacco Use    Smoking status: Former Smoker     Packs/day: 1 00     Years: 15 00     Pack years: 15 00     Types: Cigarettes, Cigars     Last attempt to quit: 2000     Years since quittin     Smokeless tobacco: Never Used   Substance and Sexual Activity    Alcohol use: Yes     Comment: rare    Drug use: Never    Sexual activity: None   Lifestyle    Physical activity: Days per week: None     Minutes per session: None    Stress: None   Relationships    Social connections:     Talks on phone: None     Gets together: None     Attends Gnosticist service: None     Active member of club or organization: None     Attends meetings of clubs or organizations: None     Relationship status: None    Intimate partner violence:     Fear of current or ex partner: None     Emotionally abused: None     Physically abused: None     Forced sexual activity: None   Other Topics Concern    None   Social History Narrative    None       Current Outpatient Medications:     colestipol (COLESTID) 1 g tablet, Take 1 g by mouth 2 (two) times a day, Disp: , Rfl:     cycloSPORINE (RESTASIS) 0 05 % ophthalmic emulsion, Restasis 0 05 % eye drops in a dropperette, Disp: , Rfl:     multivitamin (THERAGRAN) TABS, Take 1 tablet by mouth daily, Disp: , Rfl:     Omega-3 Fatty Acids (FISH OIL) 1,000 mg, Take 1,000 mg by mouth daily, Disp: , Rfl:     rivaroxaban (XARELTO) 20 mg tablet, Take 20 mg by mouth, Disp: , Rfl:     albuterol (VENTOLIN HFA) 90 mcg/act inhaler, Inhale 2 puffs every 6 (six) hours as needed for wheezing (Patient not taking: Reported on 2/5/2020), Disp: 1 Inhaler, Rfl: 11    penicillin V potassium (VEETID) 500 mg tablet, penicillin V potassium 500 mg tablet, Disp: , Rfl:     No Known Allergies    Objective:  /80 (BP Location: Left arm, Patient Position: Sitting, Cuff Size: Adult)   Pulse 69   Temp 98 4 °F (36 9 °C)   Resp 16   Ht 5' 9" (1 753 m)   Wt 85 9 kg (189 lb 6 4 oz)   SpO2 98%   BMI 27 97 kg/m²   Physical Exam   Constitutional: He is oriented to person, place, and time  He appears well-developed and well-nourished  No distress  HENT:   Head: Normocephalic and atraumatic  Mouth/Throat: No oropharyngeal exudate  Eyes: Pupils are equal, round, and reactive to light  Conjunctivae and EOM are normal  No scleral icterus  Neck: Normal range of motion     Cardiovascular: Normal rate and regular rhythm  No murmur heard  Pulmonary/Chest: Effort normal and breath sounds normal  No respiratory distress  Maculopapular rash, on the upper back  Area of excoriation noted  Not warmth to palpation  Abdominal: Soft  Bowel sounds are normal  He exhibits no distension  There is no hepatosplenomegaly  There is no tenderness  Musculoskeletal: He exhibits no edema or tenderness  Bilateral lower extremity varicosities/spider veins noted  Right greater than left   Lymphadenopathy:     He has no cervical adenopathy  Neurological: He is alert and oriented to person, place, and time  No cranial nerve deficit  Skin: No rash noted  No erythema  No pallor  Result Review  Labs:  No visits with results within 3 Week(s) from this visit     Latest known visit with results is:   Admission on 01/20/2020, Discharged on 01/20/2020   Component Date Value Ref Range Status    WBC 01/20/2020 4 37  4 31 - 10 16 Thousand/uL Final    RBC 01/20/2020 5 09  3 88 - 5 62 Million/uL Final    Hemoglobin 01/20/2020 15 5  12 0 - 17 0 g/dL Final    Hematocrit 01/20/2020 45 6  36 5 - 49 3 % Final    MCV 01/20/2020 90  82 - 98 fL Final    MCH 01/20/2020 30 5  26 8 - 34 3 pg Final    MCHC 01/20/2020 34 0  31 4 - 37 4 g/dL Final    RDW 01/20/2020 12 2  11 6 - 15 1 % Final    MPV 01/20/2020 10 7  8 9 - 12 7 fL Final    Platelets 37/74/2896 179  149 - 390 Thousands/uL Final    nRBC 01/20/2020 0  /100 WBCs Final    Neutrophils Relative 01/20/2020 50  43 - 75 % Final    Immat GRANS % 01/20/2020 1  0 - 2 % Final    Lymphocytes Relative 01/20/2020 35  14 - 44 % Final    Monocytes Relative 01/20/2020 11  4 - 12 % Final    Eosinophils Relative 01/20/2020 2  0 - 6 % Final    Basophils Relative 01/20/2020 1  0 - 1 % Final    Neutrophils Absolute 01/20/2020 2 22  1 85 - 7 62 Thousands/µL Final    Immature Grans Absolute 01/20/2020 0 02  0 00 - 0 20 Thousand/uL Final    Lymphocytes Absolute 01/20/2020 1  51  0 60 - 4 47 Thousands/µL Final    Monocytes Absolute 01/20/2020 0 50  0 17 - 1 22 Thousand/µL Final    Eosinophils Absolute 01/20/2020 0 08  0 00 - 0 61 Thousand/µL Final    Basophils Absolute 01/20/2020 0 04  0 00 - 0 10 Thousands/µL Final    Sodium 01/20/2020 140  136 - 145 mmol/L Final    Potassium 01/20/2020 4 3  3 5 - 5 3 mmol/L Final    Chloride 01/20/2020 105  100 - 108 mmol/L Final    CO2 01/20/2020 26  21 - 32 mmol/L Final    ANION GAP 01/20/2020 9  4 - 13 mmol/L Final    BUN 01/20/2020 14  5 - 25 mg/dL Final    Creatinine 01/20/2020 1 19  0 60 - 1 30 mg/dL Final    Standardized to IDMS reference method    Glucose 01/20/2020 110  65 - 140 mg/dL Final      If the patient is fasting, the ADA then defines impaired fasting glucose as > 100 mg/dL and diabetes as > or equal to 123 mg/dL  Specimen collection should occur prior to Sulfasalazine administration due to the potential for falsely depressed results  Specimen collection should occur prior to Sulfapyridine administration due to the potential for falsely elevated results   Calcium 01/20/2020 9 0  8 3 - 10 1 mg/dL Final    eGFR 01/20/2020 65  ml/min/1 73sq m Final    Troponin I 01/20/2020 <0 02  <=0 04 ng/mL Final    Autovalidation override  Siemens Chemistry analyzer 99% cutoff is > 0 04 ng/mL in network labs     o cTnI 99% cutoff is useful only when applied to patients in the clinical setting of myocardial ischemia   o cTnI 99% cutoff should be interpreted in the context of clinical history, ECG findings and possibly cardiac imaging to establish correct diagnosis  o cTnI 99% cutoff may be suggestive but clearly not indicative of a coronary event without the clinical setting of myocardial ischemia   D-Dimer, Quant 01/20/2020 <0 27  <0 50 ug/ml FEU Final      Reference and upper limits to exclude DVT and PE are the same  Do not use to exclude if clinical symptoms are present      NT-proBNP 01/20/2020 55  <125 pg/mL Final    Troponin I 01/20/2020 <0 02  <=0 04 ng/mL Final      Siemens Chemistry analyzer 99% cutoff is > 0 04 ng/mL in network labs     o cTnI 99% cutoff is useful only when applied to patients in the clinical setting of myocardial ischemia   o cTnI 99% cutoff should be interpreted in the context of clinical history, ECG findings and possibly cardiac imaging to establish correct diagnosis  o cTnI 99% cutoff may be suggestive but clearly not indicative of a coronary event without the clinical setting of myocardial ischemia   Troponin I 01/20/2020 <0 02  <=0 04 ng/mL Final    Autovalidation override  Siemens Chemistry analyzer 99% cutoff is > 0 04 ng/mL in network labs     o cTnI 99% cutoff is useful only when applied to patients in the clinical setting of myocardial ischemia   o cTnI 99% cutoff should be interpreted in the context of clinical history, ECG findings and possibly cardiac imaging to establish correct diagnosis  o cTnI 99% cutoff may be suggestive but clearly not indicative of a coronary event without the clinical setting of myocardial ischemia        Ventricular Rate 01/20/2020 73  BPM Final    Atrial Rate 01/20/2020 76  BPM Final    W[place] Interval 01/20/2020 212  ms Final    QRSD Interval 01/20/2020 92  ms Final    QT Interval 01/20/2020 366  ms Final    QTC Interval 01/20/2020 404  ms Final    P Axis 01/20/2020 39  degrees Final    QRS Axis 01/20/2020 26  degrees Final    T Wave Axis 01/20/2020 23  degrees Final    Ventricular Rate 01/20/2020 59  BPM Final    Atrial Rate 01/20/2020 61  BPM Final    WY Interval 01/20/2020 208  ms Final    QRSD Interval 01/20/2020 98  ms Final    QT Interval 01/20/2020 398  ms Final    QTC Interval 01/20/2020 395  ms Final    P Axis 01/20/2020 26  degrees Final    QRS Axis 01/20/2020 25  degrees Final    T Wave Axis 01/20/2020 30  degrees Final    Ventricular Rate 01/20/2020 70  BPM Final    Atrial Rate 01/20/2020 73  BPM Final    W[place] Interval 01/20/2020 226  ms Final    QRSD Interval 01/20/2020 96  ms Final    QT Interval 01/20/2020 374  ms Final    QTC Interval 01/20/2020 404  ms Final    P Axis 01/20/2020 34  degrees Final    QRS Axis 01/20/2020 27  degrees Final    T Wave Axis 01/20/2020 20  degrees Final     Please note: This report has been generated by a voice recognition software system  Therefore there may be syntax, spelling, and/or grammatical errors  Please call if you have any questions

## 2022-03-28 NOTE — TELEPHONE ENCOUNTER
Called and advised Marie that Dr. Dumont gave approval for delay in start of home care until 4/1/22.     Marcia Rg RN  [facility]

## 2022-03-31 NOTE — PROGRESS NOTES
Clinic Care Coordination Contact    Situation: Patient chart reviewed by care coordinator.    Background: Patient requesting home care.    Assessment: Per chart review, home care ordered with a start date of 4/1/2022.    Plan/Recommendations: Care Coordination will continue to follow as Pt accesses home care. CHW will follow-up with Patient in one month. SW CC will remain available for CC needs and will schedule a clinical review in 6 weeks.     YESSI Vela  Clinic Care Coordinator  Ph. [phone]  [email or web address]

## 2022-04-01 ENCOUNTER — MEDICAL CORRESPONDENCE (OUTPATIENT)
Dept: HEALTH INFORMATION MANAGEMENT | Facility: CLINIC | Age: 87
End: 2022-04-01
Payer: MEDICARE

## 2022-04-01 ENCOUNTER — TELEPHONE (OUTPATIENT)
Dept: INTERNAL MEDICINE | Facility: CLINIC | Age: 87
End: 2022-04-01
Payer: MEDICARE

## 2022-04-01 DIAGNOSIS — E03.8 OTHER SPECIFIED HYPOTHYROIDISM: ICD-10-CM

## 2022-04-01 NOTE — TELEPHONE ENCOUNTER
Home care calls to request order and provide medication updates for patient.     Home care orders:  The Home Care/Assisted Living/Nursing Facility is calling regarding an established patient.  Has the patient seen Home Care in the past or is currently residing in Assisted Living or Nursing Facility? Yes.     Maine calling from [facility] requesting the following orders that are within the Home Care, Assisted Living or Nursing Home Eval and Treatment standing order and can be signed as standing order signature required by RN.    Preferred Call Back Number: [phone]    hc new and PT/OT/Speech Therapy   Skilled nursing 1x/week through certification with 3 PRN visits.   Home health aide - 1x/week for 1 month  OT - evaluate and treat    Any additional Orders:  Any order requested not stated above are outside of the standing order and must be routed to a licensed practitioner for approval.    No    Writer has verified Requestor will send fax to have orders signed.       Medication updates:     She reviewed patient's medication and patient does not have any levothyroxine at home, but does not recall this being discontinued. Informed Maine that Levothyroxine is still on patient's medication list so appears she should still be taking this. However, last ordered 1 year ago so prescription would be  at the pharmacy. Will have provider to review to confirm if patient is to continue Levothyroxine and send refill to pharmacy if needed.    Patient reports she is no longer taking Flonase, Advair or Spriva. Pulmonary follow-up appointment on 22.     Marcia Rg RN  [facility]

## 2022-04-04 ENCOUNTER — ONCOLOGY VISIT (OUTPATIENT)
Dept: ONCOLOGY | Facility: CLINIC | Age: 87
End: 2022-04-04
Attending: INTERNAL MEDICINE
Payer: MEDICARE

## 2022-04-04 ENCOUNTER — MEDICAL CORRESPONDENCE (OUTPATIENT)
Dept: INTERNAL MEDICINE | Facility: CLINIC | Age: 87
End: 2022-04-04

## 2022-04-04 VITALS
RESPIRATION RATE: 18 BRPM | SYSTOLIC BLOOD PRESSURE: 121 MMHG | OXYGEN SATURATION: 99 % | WEIGHT: 90.1 LBS | TEMPERATURE: 97.7 F | HEIGHT: 59 IN | HEART RATE: 62 BPM | DIASTOLIC BLOOD PRESSURE: 51 MMHG | BODY MASS INDEX: 18.16 KG/M2

## 2022-04-04 DIAGNOSIS — J96.11 CHRONIC RESPIRATORY FAILURE WITH HYPOXIA (H): ICD-10-CM

## 2022-04-04 DIAGNOSIS — J44.9 CHRONIC OBSTRUCTIVE PULMONARY DISEASE, UNSPECIFIED COPD TYPE (H): ICD-10-CM

## 2022-04-04 PROCEDURE — 99214 OFFICE O/P EST MOD 30 MIN: CPT | Performed by: INTERNAL MEDICINE

## 2022-04-04 PROCEDURE — G0463 HOSPITAL OUTPT CLINIC VISIT: HCPCS

## 2022-04-04 RX ORDER — FORMOTEROL FUMARATE DIHYDRATE 20 UG/2ML
20 SOLUTION RESPIRATORY (INHALATION) EVERY 12 HOURS
Qty: 120 ML | Refills: 11 | Status: SHIPPED | OUTPATIENT
Start: 2022-04-04 | End: 2022-04-21

## 2022-04-04 RX ORDER — BUDESONIDE 0.5 MG/2ML
0.5 INHALANT ORAL 2 TIMES DAILY
Qty: 120 ML | Refills: 11 | Status: SHIPPED | OUTPATIENT
Start: 2022-04-04

## 2022-04-04 ASSESSMENT — PAIN SCALES - GENERAL: PAINLEVEL: NO PAIN (0)

## 2022-04-04 NOTE — PATIENT INSTRUCTIONS
We can help you find an inhaler but the down side to inhalers is that you have to master a specific technique to get the medication into your lungs whereas with a nebulizer you mostly just have to breathe and the medications get into the lungs.     I recommend you start using a new nebulizer TO REPLACE to your current ones.     Daily routine:  Morning - budesonide, formoterol, revefenacin  Evening - budesonide, formoterol    If you can't get any or all of these, we can keep working with albuterol/ipratropium but might need to use it more consistently (they only last a few hours so need to be taken 3-4 times/day)    Any time you need the red inhaler albuterol for immediate relief, you can use it.     We should discuss an action plan for you - if you were feeling bad for a few days before, we need to take advantage of this and have a plan for home treatment (antibiotics/steroids) before you need to be in the ER    It might be easier for Margaret to access your Skinfixt, there is a way we can give her access to your Skinfixt so she can send and read messages from your doctors.

## 2022-04-04 NOTE — PROGRESS NOTES
[facility] Cancer Care Nodule Clinic Follow Up Visit    Reason for Visit  Marie Kline is a 89 year old female who is seen in follow up for abnormal chest CT   Pulmonary HPI  - She was in the ED about 3 weeks ago and treated for acute exacerbation of COPD with antibiotics and prednisone. She needs to do a neb first thing in the morning to get going, then can have coffee.  She did not ever acquire an inhaler - we did provide a list of alternatives. Usually uses albuterol and ipratropium in the morning and before bed, then albuterol during the day. She lives in assisted living, is planning to get in home care for personal care / grooming.     She has been on and off oxygen at home for seven years, using [employer]. They are hoping to get a new portable oxygen concentrator and new oxygen supplier.     Here today on her own.    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  The patient was seen and examined by Marva Powell MD   Current Outpatient Medications   Medication     ACE/ARB/ARNI NOT PRESCRIBED (INTENTIONAL)     albuterol (PROAIR HFA/PROVENTIL HFA/VENTOLIN HFA) 108 (90 Base) MCG/ACT inhaler     aspirin 81 MG EC tablet     budesonide (PULMICORT) 0.5 MG/2ML neb solution     ferrous sulfate (IRON) 325 (65 FE) MG tablet     fluticasone (FLONASE) 50 MCG/ACT nasal spray     fluticasone-salmeterol (ADVAIR) 500-50 MCG/DOSE inhaler     furosemide (LASIX) 20 MG tablet     Immune Globulin, Human, (OCTAGAM) 5 GM/100ML SOLN     ipratropium - albuterol 0.5 mg/2.5 mg/3 mL (DUONEB) 0.5-2.5 (3) MG/3ML neb solution     levothyroxine (SYNTHROID/LEVOTHROID) 75 MCG tablet     loperamide (IMODIUM  A-D) 2 MG tablet     loperamide (IMODIUM) 2 MG capsule     metoprolol succinate ER (TOPROL-XL) 25 MG 24 hr tablet     Multiple Vitamins-Minerals (MULTIVITAMIN ADULT) CHEW     sertraline (ZOLOFT) 50 MG tablet     tiotropium (SPIRIVA) 18 MCG inhaled capsule     traZODone (DESYREL) 50 MG tablet     No current facility-administered medications for this visit.     Allergies   Allergen Reactions     Diagnostic X-Ray Materials Hives     CT DYE     Contrast Dye Hives     CT DYE     Prolia [Denosumab]      Osteonecrosis jaw       Wound Dressing Adhesive      Social History     Socioeconomic History     Marital status:      Spouse name: Not on file     Number of children: Not on file     Years of education: Not on file     Highest education level: Not on file   Occupational History     Not on file   Tobacco Use     Smoking status: Former Smoker     Types: Cigarettes     Quit date: 2/3/1996     Years since quittin.1     Smokeless tobacco: Never Used   Vaping Use     Vaping Use: Never used   Substance and Sexual Activity     Alcohol use: No     Alcohol/week: 0.0 standard drinks     Drug use: No     Sexual activity: Not Currently     Partners: Male   Other Topics Concern      Service Not Asked     Blood Transfusions Not Asked     Caffeine Concern No     Comment: 1/2-2 cups daily     Occupational Exposure Not Asked     Hobby Hazards Not Asked     Sleep Concern Not Asked     Stress Concern Not Asked     Weight Concern Not Asked     Special Diet Yes     Comment: no added salt, no dairy, more gatorade     Back Care Not Asked     Exercise No     Comment: walking her dog 5 times per day - none recently     Bike Helmet Not Asked     Seat Belt Not Asked     Self-Exams Not Asked     Parent/sibling w/ CABG, MI or angioplasty before 65F 55M? No   Social History Narrative     Not on file     Social Determinants of Health     Financial Resource Strain: Low Risk      Difficulty of Paying Living Expenses: Not hard at all    Food Insecurity: No Food Insecurity     Worried About Running Out of Food in the Last Year: Never true     Ran Out of Food in the Last Year: Never true   Transportation Needs: No Transportation Needs     Lack of Transportation (Medical): No     Lack of Transportation (Non-Medical): No   Physical Activity: Not on file   Stress: Not on file   Social Connections: Not on file   Intimate Partner Violence: Not At Risk     Fear of Current or Ex-Partner: No     Emotionally Abused: No     Physically Abused: No     Sexually Abused: No   Housing Stability: Not on file     Past Medical History:   Diagnosis Date     Arthritis     Generalized     Bladder cancer (H)      Bladder cancer (H)      Cardiomyopathy (H)      CLL (chronic lymphocytic leukemia) (H)      Congestive heart failure (H) 10/24/2014    flash pulm edema ass w/Takotsubo     COPD (chronic obstructive pulmonary disease) (H)     noc O2     Hypertension      Hypothyroid      Mumps      Myocardial infarction (H) 10/2014    Takotsubo presentation w/mild CAD     Pulmonary edema cardiac cause (H) 10/08/2014    associated w/Takotsubo ACS     Tricuspid valve disorders, 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    TR 2-3+ per echo     Past Surgical History:   Procedure Laterality Date     BIOPSY LYMPH NODE INGUINAL Right 10/23/2018    Procedure: excsional biopsy right inguinal lymph node;  Surgeon: Reji Connors MD;  Location: [facility] OR     BLADDER SURGERY       CORONARY ANGIOGRAPHY ADULT ORDER  10/2014    minimal CAD     CV LEFT HEART CATH N/A 12/11/2018    Procedure: Left Heart Cath;  Surgeon: Sadiq Carrasco MD;  Location:  HEART CARDIAC CATH LAB     CYSTOSCOPY       CYSTOSCOPY, BIOPSY BLADDER, COMBINED N/A 2/9/2016    Procedure: COMBINED CYSTOSCOPY, BIOPSY BLADDER;  Surgeon: Kar Nuñez MD;  Location:  OR     CYSTOSCOPY, TRANSURETHRAL RESECTION (TUR) TUMOR BLADDER, COMBINED N/A 2/9/2016    Procedure: COMBINED CYSTOSCOPY, TRANSURETHRAL RESECTION (TUR)  "TUMOR BLADDER;  Surgeon: Kar Nuñez MD;  Location: [facility] OR     ESOPHAGOSCOPY, GASTROSCOPY, DUODENOSCOPY (EGD), COMBINED N/A 6/22/2016    Procedure: COMBINED ESOPHAGOSCOPY, GASTROSCOPY, DUODENOSCOPY (EGD);  Surgeon: Freddie Diez MD;  Location: [facility] GI     OPEN REDUCTION INTERNAL FIXATION HIP BIPOLAR Left 11/19/2018    Procedure: Left bipolar hemiarthroplasty;  Surgeon: Scar Reynolds MD;  Location: [facility] OR     Family History   Problem Relation Age of Onset     Cerebrovascular Disease Mother      Cancer Father        Exam:   /51   Pulse 62   Temp 97.7  F (36.5  C) (Tympanic)   Resp 18   Ht 1.499 m (4' 11\")   Wt 40.9 kg (90 lb 1.6 oz)   LMP  (LMP Unknown)   SpO2 99%   BMI 18.20 kg/m    GENERAL APPEARANCE: thin female, alert, and in no apparent distress.  PSYCH: mentation appears normal. and affect normal/bright  Results:  - My interpretation of the images relevant for this visit includes: CT images 10/19/21 reviewed, stable RUL linear opacity back to at least 2018; Bronchial wall thickening and scattered mucus plugging in the left lower lobe; mildly enlarged right paratracheal lymph node  - My interpretation of the PFT's relevant for this visit includes: Dec 2021 severe obstruction, air trapping and hyperexpansion, severe reduction in diffusion      Assessment and plan: Marie is an 88 yo female with CLL recently hospitalized with pneumonia, follow up for abnormal chest imaging  Lung nodule - seen on chest CT, while hospitalized with pneumonia  COPD - severe with chronic bronchitis   Currently on short-acting bronchodilators nebulized. I think she has indication for inhaled corticosteroid, long-acting beta agonist, long-acting muscarinic agent. She was unable to get Trelegy due to cost although it doesn't sound like she pursued any formulary alternatives we provided. Nonetheless, I'd be skeptical she can perform the proper technique to actuate most inhaler devices. So nebulizer may be " the most effective delivery for her. She is only taking her short-acting nebs twice a day now so I will try to get long-acting bronchodilators.    New plan for triple therapy via nebulizer:  Morning - budesonide, formoterol, revefenacin (replaces ipratropium);  Evening - budesonide, formoterol    - I'd like to offer her a COPD action plan to limit hospital visits she would like us to discuss with her daughter who will be back from traveling around April 9.   Chronic hypoxic respiratory failure. Stable supplemental oxygen needs.       COVID vaccine booster today

## 2022-04-04 NOTE — LETTER
4/4/2022         RE: Marie Kline  [address]  [place] [address]        Dear Colleague,    Thank you for referring your patient, Marie Kline, to the [facility] CANCER [facility]. Please see a copy of my visit note below.    [facility] Cancer Care Nodule Clinic Follow Up Visit    Reason for Visit  Marie Kline is a 89 year old female who is seen in follow up for abnormal chest CT   Pulmonary HPI  - She was in the ED about 3 weeks ago and treated for acute exacerbation of COPD with antibiotics and prednisone. She needs to do a neb first thing in the morning to get going, then can have coffee.  She did not ever acquire an inhaler - we did provide a list of alternatives. Usually uses albuterol and ipratropium in the morning and before bed, then albuterol during the day. She lives in assisted living, is planning to get in home care for personal care / grooming.     She has been on and off oxygen at home for seven years, using [employer]. They are hoping to get a new portable oxygen concentrator and new oxygen supplier.     Here today on her own.    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  The patient was seen and examined by Marva Powell MD   Current Outpatient Medications   Medication     ACE/ARB/ARNI NOT PRESCRIBED (INTENTIONAL)     albuterol (PROAIR HFA/PROVENTIL HFA/VENTOLIN HFA) 108 (90 Base) MCG/ACT inhaler     aspirin 81 MG EC tablet     budesonide (PULMICORT) 0.5 MG/2ML neb solution     ferrous sulfate (IRON) 325 (65 FE) MG tablet     fluticasone (FLONASE) 50 MCG/ACT nasal spray     fluticasone-salmeterol (ADVAIR) 500-50  MCG/DOSE inhaler     furosemide (LASIX) 20 MG tablet     Immune Globulin, Human, (OCTAGAM) 5 GM/100ML SOLN     ipratropium - albuterol 0.5 mg/2.5 mg/3 mL (DUONEB) 0.5-2.5 (3) MG/3ML neb solution     levothyroxine (SYNTHROID/LEVOTHROID) 75 MCG tablet     loperamide (IMODIUM A-D) 2 MG tablet     loperamide (IMODIUM) 2 MG capsule     metoprolol succinate ER (TOPROL-XL) 25 MG 24 hr tablet     Multiple Vitamins-Minerals (MULTIVITAMIN ADULT) CHEW     sertraline (ZOLOFT) 50 MG tablet     tiotropium (SPIRIVA) 18 MCG inhaled capsule     traZODone (DESYREL) 50 MG tablet     No current facility-administered medications for this visit.     Allergies   Allergen Reactions     Diagnostic X-Ray Materials Hives     CT DYE     Contrast Dye Hives     CT DYE     Prolia [Denosumab]      Osteonecrosis jaw       Wound Dressing Adhesive      Social History     Socioeconomic History     Marital status:      Spouse name: Not on file     Number of children: Not on file     Years of education: Not on file     Highest education level: Not on file   Occupational History     Not on file   Tobacco Use     Smoking status: Former Smoker     Types: Cigarettes     Quit date: 2/3/1996     Years since quittin.1     Smokeless tobacco: Never Used   Vaping Use     Vaping Use: Never used   Substance and Sexual Activity     Alcohol use: No     Alcohol/week: 0.0 standard drinks     Drug use: No     Sexual activity: Not Currently     Partners: Male   Other Topics Concern      Service Not Asked     Blood Transfusions Not Asked     Caffeine Concern No     Comment: 1/2-2 cups daily     Occupational Exposure Not Asked     Hobby Hazards Not Asked     Sleep Concern Not Asked     Stress Concern Not Asked     Weight Concern Not Asked     Special Diet Yes     Comment: no added salt, no dairy, more gatorade     Back Care Not Asked     Exercise No     Comment: walking her dog 5 times per day - none recently     Bike Helmet Not Asked     Seat Belt Not  Asked     Self-Exams Not Asked     Parent/sibling w/ CABG, MI or angioplasty before 65F 55M? No   Social History Narrative     Not on file     Social Determinants of Health     Financial Resource Strain: Low Risk      Difficulty of Paying Living Expenses: Not hard at all   Food Insecurity: No Food Insecurity     Worried About Running Out of Food in the Last Year: Never true     Ran Out of Food in the Last Year: Never true   Transportation Needs: No Transportation Needs     Lack of Transportation (Medical): No     Lack of Transportation (Non-Medical): No   Physical Activity: Not on file   Stress: Not on file   Social Connections: Not on file   Intimate Partner Violence: Not At Risk     Fear of Current or Ex-Partner: No     Emotionally Abused: No     Physically Abused: No     Sexually Abused: No   Housing Stability: Not on file     Past Medical History:   Diagnosis Date     Arthritis     Generalized     Bladder cancer (H)      Bladder cancer (H)      Cardiomyopathy (H)      CLL (chronic lymphocytic leukemia) (H)      Congestive heart failure (H) 10/24/2014    flash pulm edema ass w/Takotsubo     COPD (chronic obstructive pulmonary disease) (H)     noc O2     Hypertension      Hypothyroid      Mumps      Myocardial infarction (H) 10/2014    Takotsubo presentation w/mild CAD     Pulmonary edema cardiac cause (H) 10/08/2014    associated w/Takotsubo ACS     Tricuspid valve disorders, 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    TR 2-3+ per echo     Past Surgical History:   Procedure Laterality Date     BIOPSY LYMPH NODE INGUINAL Right 10/23/2018    Procedure: excsional biopsy right inguinal lymph node;  Surgeon: Reji Connors MD;  Location: [facility] OR     BLADDER SURGERY       CORONARY ANGIOGRAPHY ADULT ORDER  10/2014    minimal CAD     CV LEFT HEART CATH N/A 12/11/2018    Procedure: Left Heart Cath;  Surgeon: Sadiq Carrasco MD;  Location: [facility] HEART CARDIAC CATH LAB     CYSTOSCOPY       CYSTOSCOPY, BIOPSY BLADDER,  "COMBINED N/A 2/9/2016    Procedure: COMBINED CYSTOSCOPY, BIOPSY BLADDER;  Surgeon: Kar Nuñez MD;  Location: [facility] OR     CYSTOSCOPY, TRANSURETHRAL RESECTION (TUR) TUMOR BLADDER, COMBINED N/A 2/9/2016    Procedure: COMBINED CYSTOSCOPY, TRANSURETHRAL RESECTION (TUR) TUMOR BLADDER;  Surgeon: Kar Nuñez MD;  Location: [facility] OR     ESOPHAGOSCOPY, GASTROSCOPY, DUODENOSCOPY (EGD), COMBINED N/A 6/22/2016    Procedure: COMBINED ESOPHAGOSCOPY, GASTROSCOPY, DUODENOSCOPY (EGD);  Surgeon: Freddie Diez MD;  Location: [facility] GI     OPEN REDUCTION INTERNAL FIXATION HIP BIPOLAR Left 11/19/2018    Procedure: Left bipolar hemiarthroplasty;  Surgeon: Scar Reynolds MD;  Location: [facility] OR     Family History   Problem Relation Age of Onset     Cerebrovascular Disease Mother      Cancer Father        Exam:   /51   Pulse 62   Temp 97.7  F (36.5  C) (Tympanic)   Resp 18   Ht 1.499 m (4' 11\")   Wt 40.9 kg (90 lb 1.6 oz)   LMP  (LMP Unknown)   SpO2 99%   BMI 18.20 kg/m    GENERAL APPEARANCE: thin female, alert, and in no apparent distress.  PSYCH: mentation appears normal. and affect normal/bright  Results:  - My interpretation of the images relevant for this visit includes: CT images 10/19/21 reviewed, stable RUL linear opacity back to at least 2018; Bronchial wall thickening and scattered mucus plugging in the left lower lobe; mildly enlarged right paratracheal lymph node  - My interpretation of the PFT's relevant for this visit includes: Dec 2021 severe obstruction, air trapping and hyperexpansion, severe reduction in diffusion      Assessment and plan: Marie is an 88 yo female with CLL recently hospitalized with pneumonia, follow up for abnormal chest imaging  Lung nodule - seen on chest CT, while hospitalized with pneumonia  COPD - severe with chronic bronchitis   Currently on short-acting bronchodilators nebulized. I think she has indication for inhaled corticosteroid, long-acting beta agonist, " long-acting muscarinic agent. She was unable to get Trelegy due to cost although it doesn't sound like she pursued any formulary alternatives we provided. Nonetheless, I'd be skeptical she can perform the proper technique to actuate most inhaler devices. So nebulizer may be the most effective delivery for her. She is only taking her short-acting nebs twice a day now so I will try to get long-acting bronchodilators.    New plan for triple therapy via nebulizer:  Morning - budesonide, formoterol, revefenacin (replaces ipratropium);  Evening - budesonide, formoterol    - I'd like to offer her a COPD action plan to limit hospital visits she would like us to discuss with her daughter who will be back from traveling around April 9.   Chronic hypoxic respiratory failure. Stable supplemental oxygen needs.       COVID vaccine booster today      Again, thank you for allowing me to participate in the care of your patient.        Sincerely,        Marva Powell MD

## 2022-04-04 NOTE — NURSING NOTE
"Oncology Rooming Note    April 4, 2022 10:43 AM   Marie Kline is a 89 year old female who presents for:    Chief Complaint   Patient presents with     Lung Nodule     COPD     Initial Vitals: /51   Pulse 62   Temp 97.7  F (36.5  C) (Tympanic)   Resp 18   Ht 1.499 m (4' 11\")   Wt 40.9 kg (90 lb 1.6 oz)   LMP  (LMP Unknown)   SpO2 (!) 88%   BMI 18.20 kg/m   Estimated body mass index is 18.2 kg/m  as calculated from the following:    Height as of this encounter: 1.499 m (4' 11\").    Weight as of this encounter: 40.9 kg (90 lb 1.6 oz). Body surface area is 1.3 meters squared.  No Pain (0) Comment: Data Unavailable   No LMP recorded (lmp unknown). Patient has had a hysterectomy.  Allergies reviewed: Yes  Medications reviewed: Yes    Medications: Medication refills not needed today.  Pharmacy name entered into [facility]:    [id number] [place], [place] - [address] [facility] - [place], [place] - [address] [place] [address]    Clinical concerns: f/u       iVta Oleary CMA              "

## 2022-04-11 ENCOUNTER — TELEPHONE (OUTPATIENT)
Dept: INTERNAL MEDICINE | Facility: CLINIC | Age: 87
End: 2022-04-11
Payer: MEDICARE

## 2022-04-11 ENCOUNTER — PATIENT OUTREACH (OUTPATIENT)
Dept: ONCOLOGY | Facility: CLINIC | Age: 87
End: 2022-04-11
Payer: MEDICARE

## 2022-04-11 RX ORDER — LEVOTHYROXINE SODIUM 75 UG/1
75 TABLET ORAL DAILY
Qty: 90 TABLET | Refills: 0 | Status: SHIPPED | OUTPATIENT
Start: 2022-04-11 | End: 2022-05-23 | Stop reason: DRUGHIGH

## 2022-04-11 NOTE — TELEPHONE ENCOUNTER
The Home Care/Assisted Living/Nursing Facility called regarding an established patient.  Has the patient seen Home Care in the past or is currently residing in Assisted Living or Nursing Facility? Yes.     Alla called from [facility] requesting the following orders that are within the Home Care, Assisted Living or Nursing Home Eval and Treatment standing order and can be signed as standing order signature required by RN.    Preferred Call Back Number: [phone]    PT/OT/Speech Therapy   OT visits: 4 visits over 6 weeks    Any additional Orders:  Any order requested not stated above are outside of the standing order and must be routed to a licensed practitioner for approval.    No    Called Alla and provided verbal authorization given for home care orders per [facility] protocol. Writer has verified [employer] will send fax to have orders signed.     Marcia Rg RN  [facility]

## 2022-04-11 NOTE — PROGRESS NOTES
Marie called writer back. She reports she does have the budesonide and revefenacin nebulizers but she does not have the formoterol yet. Marie had a difficult time with the medication names.    After several minutes, writer determined Marie takes budesonide twice per day. She does have her revefenacin at home but she hasn't started it yet. Writer instructed her to start tomorrow morning and per Dr. Powell's instructions she should take it in the mornings.     Marie reports she has not received the formoterol yet and she gets her meds delivered to her.     Marie reports she lives at [facility] and there is an independent, dependent, and memory care area there. She lives in the independent side. She does get weekly help every Wednesday with showering but does not have any additional help such as med set up. She reports she sees Dr. Ivory with [facility] for her monthly leukemia infusions.     Marie reports she does not have daily support from friends or family members. She sees her daughter about once per week normally but last week she didn't see her at all because they were away on vacation.     Marie reports her breathing is good today. She understands she stills her formoterol and she needs to start her revefenacin tomorrow.     Writer contacted [facility] pharmacy. They report there is an extensive back order of the formoterol but they are working on getting it to the patient as soon as it comes in. [facility] pharmacist reports this has been approved by insurance.     Writer contacted Marie's daughter, Margaret and discussed Dr. Powell's plan for nebulizers:     Morning - budesonide, formoterol, revefenacin  Evening - budesonide, formoterol     Writer explained [facility] pharmacy is still working on getting the formoterol to the patient and writer will continue to watch for this.     Writer also discussed the action plan for monitoring Marie's COPD to help prevent future ED visits. Margaret reports she calls Marie and speaks  with her daily but she only sees her a few times a week. Margaret reports Marie needs to get moved to the assisted living area of [facility] but Marie doesn't understand that she will have to pay more. Margaret will continue to have this conversation with Marie and update us once she gets moved to dependent living with more supportive services available.    Writer encouraged Margaret to call us if Marie has any changes in her respiratory status. Writer confirmed Margaret has our clinic phone number and she is aware that a triage nurse is available 24/7.     Margaret verbalized understanding and had no further questions or concerns at this time.     Ksenia Silver, RN, BSN, ABDIAS  RN Care Coordinator  [facility]  [phone]

## 2022-04-11 NOTE — TELEPHONE ENCOUNTER
Okay for verbal orders as requested below.  Refills of her levothyroxine have been submitted to her pharmacy.

## 2022-04-11 NOTE — TELEPHONE ENCOUNTER
Routed to provider to review medication update/request in message below from home care.     Marcia Rg RN  [facility]

## 2022-04-11 NOTE — TELEPHONE ENCOUNTER
Verbal orders for home care were given to [place] when she called on 4/1/22. [place] advised patient is to continue Levothyroxine and refill sent to pharmacy today.     Marcia Rg RN  [facility]

## 2022-04-11 NOTE — PROGRESS NOTES
Writer called Marie to confirm she received all of her nebulizers that Dr. Powell prescribed. There was no answer. Left a message to return to clinic.    Ksenia Silver, RN, BSN, ABDIAS  RN Care Coordinator  [facility]  [phone]

## 2022-04-11 NOTE — TELEPHONE ENCOUNTER
Alla from [facility] calling for verbal orders for  OT  4 visits over 6 weeks  Ok to call and lm [phone]

## 2022-04-12 ENCOUNTER — TELEPHONE (OUTPATIENT)
Dept: INTERNAL MEDICINE | Facility: CLINIC | Age: 87
End: 2022-04-12
Payer: MEDICARE

## 2022-04-12 NOTE — TELEPHONE ENCOUNTER
[facility] * SN. Occupational Therapy. HOME HEALTH AIDE order received via fax    Forms in Dr. mail box for review and signature.

## 2022-04-13 ENCOUNTER — TELEPHONE (OUTPATIENT)
Dept: INTERNAL MEDICINE | Facility: CLINIC | Age: 87
End: 2022-04-13
Payer: MEDICARE

## 2022-04-13 DIAGNOSIS — Z53.9 DIAGNOSIS NOT YET DEFINED: Primary | ICD-10-CM

## 2022-04-13 PROCEDURE — G0180 MD CERTIFICATION HHA PATIENT: HCPCS | Performed by: INTERNAL MEDICINE

## 2022-04-13 NOTE — TELEPHONE ENCOUNTER
Skilled Nursed / Physical Therapy / Occupational Therapy / SLP orders received via fax. Form in your mailbox to be signed.

## 2022-04-14 ENCOUNTER — PATIENT OUTREACH (OUTPATIENT)
Dept: ONCOLOGY | Facility: CLINIC | Age: 87
End: 2022-04-14
Payer: MEDICARE

## 2022-04-14 ENCOUNTER — TELEPHONE (OUTPATIENT)
Dept: INTERNAL MEDICINE | Facility: CLINIC | Age: 87
End: 2022-04-14
Payer: MEDICARE

## 2022-04-14 NOTE — TELEPHONE ENCOUNTER
[facility] * F2F  Received via fax     Forms in Dr. mail box for review and signature.

## 2022-04-14 NOTE — PROGRESS NOTES
Writer called [facility] for an update on if they have received pt's formoterol yet. The pharmacist reports they try and reorder it every 2 days. He recommends checking in weekly with the pharmacy to get updates.

## 2022-04-20 ENCOUNTER — PATIENT OUTREACH (OUTPATIENT)
Dept: ONCOLOGY | Facility: CLINIC | Age: 87
End: 2022-04-20
Payer: MEDICARE

## 2022-04-20 DIAGNOSIS — J44.1 COPD, FREQUENT EXACERBATIONS (H): ICD-10-CM

## 2022-04-20 DIAGNOSIS — J44.9 CHRONIC OBSTRUCTIVE PULMONARY DISEASE, UNSPECIFIED COPD TYPE (H): Primary | ICD-10-CM

## 2022-04-20 NOTE — PROGRESS NOTES
Writer routing PA request to retail PA team as high priority as the patient has been waiting since April 4, 2022 for her nebulizer.  Ksenia Silver, RN, BSN, ABDIAS  RN Care Coordinator  [facility]  [phone]

## 2022-04-20 NOTE — PROGRESS NOTES
Per Ayse, pharmacist from [facility] pharmacy, the formoterol that was received is a different NPC # than what was originally ordered but that was the only one that could be ordered and it was difficult for them to find a supplier.     Ayse reports the PA is actually required for this medication because it is not covered under medicare OR part B and it is very expensive OOP.     Writer will re-route to the PA team to see if they will be able to complete a PA.    Ksenia Silver, RN, BSN, ABDIAS  RN Care Coordinator  [facility]  [phone]

## 2022-04-20 NOTE — TELEPHONE ENCOUNTER
Prior Authorization Not Needed per Insurance-I spoke with loki Leung at [facility] and let her know they need to send forms for Medicare Part B to clinic.     Medication: formoterol (PERFOROMIST) 20 MCG/2ML neb solution-Not Needed  Insurance Company:  Medicare Part B    This is a Medicare patient. We do not process PAs for claims that will bill under Medicare Part B. This would be handled by the pharmacy. I contacted pharmacy and let them know PART D does not cover these meds, and pharmacy needs to process through part B. If a medical PA is required to send all pertinent information to the clinic directly.

## 2022-04-20 NOTE — PROGRESS NOTES
Per pharmacy PA team, nebulizers should be covered under medicare part B and the pharmacy should work with the clinic to order something that is covered.     Writer called [facility] pharmacy and they report they are using the COPD diagnosis code and they still got a rejection letter from Medicare part B saying it should be covered by pt's other insurance. And the patient's other insurance is saying it should be covered under Medicare part B.     Writer requested the pharmacy send us the rejection letter to see if there is anything we can do to help get this neb covered.     Ksenia Silver, RN, BSN, ABDIAS  RN Care Coordinator  [facility]  [phone]

## 2022-04-21 RX ORDER — ARFORMOTEROL TARTRATE 15 UG/2ML
15 SOLUTION RESPIRATORY (INHALATION) 2 TIMES DAILY
Qty: 120 ML | Refills: 11 | Status: SHIPPED | OUTPATIENT
Start: 2022-04-21 | End: 2022-04-21

## 2022-04-21 RX ORDER — ARFORMOTEROL TARTRATE 15 UG/2ML
15 SOLUTION RESPIRATORY (INHALATION) 2 TIMES DAILY
Qty: 120 ML | Refills: 11 | Status: SHIPPED | OUTPATIENT
Start: 2022-04-21 | End: 2022-06-09

## 2022-04-21 NOTE — PROGRESS NOTES
Marie has severe COPD with frequent exacerbations. She was recently in the emergency department. Her third ED/hospitalization in less than a year. 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 She needs triple therapy for severe COPD with frequent exacerbations.     I prescribed formoterol, which was denied. I am prescribing arformoterol as an alternative

## 2022-04-21 NOTE — PROGRESS NOTES
Writer updated Margaret and informed her of the plan. Margaret verbalized understanding and is in agreement with the plan.    Ksenia Silver, RN, BSN, ABDIAS  RN Care Coordinator  [facility]  [phone]

## 2022-04-21 NOTE — PROGRESS NOTES
Writer called Marie's daughter, Margaret, and updated her that we got a notification from the patient's pharmacy that Marie's insurance is denying the formoterol. Per Margaret, Marie's breathing is doing ok and she is having her normal level of work of breathing. Margaret confirmed Marie is taking budesonide twice per day and revefenacin daily.     Writer explained I will discuss with Dr. Powell and call Margaret back with the plan. Margaret verbalized understanding and is in agreement with the plan.     Ksenia Silver, RN, BSN, ABDIAS  RN Care Coordinator  [facility]  [phone]

## 2022-04-26 DIAGNOSIS — I24.9 ACS (ACUTE CORONARY SYNDROME) (H): ICD-10-CM

## 2022-04-28 RX ORDER — METOPROLOL SUCCINATE 25 MG/1
25 TABLET, EXTENDED RELEASE ORAL DAILY
Qty: 90 TABLET | Refills: 0 | Status: SHIPPED | OUTPATIENT
Start: 2022-04-28 | End: 2022-08-01

## 2022-04-28 NOTE — TELEPHONE ENCOUNTER
Routing refill request to provider for review/approval because:  Different prescribing provider    Called patient to let her know that Dr. Dumont is her primary care provider.  Patient stated that the pharmacy was concerned about it as her old prescription still had Dr. Suarez name on it.

## 2022-05-03 ENCOUNTER — TELEPHONE (OUTPATIENT)
Dept: INTERNAL MEDICINE | Facility: CLINIC | Age: 87
End: 2022-05-03

## 2022-05-03 ENCOUNTER — TELEPHONE (OUTPATIENT)
Dept: INTERNAL MEDICINE | Facility: CLINIC | Age: 87
End: 2022-05-03
Payer: MEDICARE

## 2022-05-03 DIAGNOSIS — I24.9 ACS (ACUTE CORONARY SYNDROME) (H): ICD-10-CM

## 2022-05-03 RX ORDER — METOPROLOL SUCCINATE 25 MG/1
25 TABLET, EXTENDED RELEASE ORAL DAILY
Qty: 90 TABLET | Refills: 0 | OUTPATIENT
Start: 2022-05-03

## 2022-05-03 NOTE — TELEPHONE ENCOUNTER
Ariadna from [facility] calling for verbal orders for Home health aide  1 visit a week for 4 weeks  Ok to call and lm [phone]

## 2022-05-03 NOTE — TELEPHONE ENCOUNTER
Refused Prescriptions:                       Disp   Refills    metoprolol succinate ER (TOPROL-XL) 25 MG *90 tab*0        Sig: Take 1 tablet (25 mg) by mouth daily      90 tablets with 0 refills was sent on 4/28/2022      Sanjana VILLANUEVA RN   [facility]

## 2022-05-03 NOTE — TELEPHONE ENCOUNTER
Patient currently receiving home care. Ariadna with [facility] requesting orders for ongoing home care visits:   Home health aide - 1 visit a week for 4 weeks    Called and left voice message for Ariadna with verbal authorization given for home care orders per [facility] protocol.     Marcia Rg RN  [facility]

## 2022-05-03 NOTE — TELEPHONE ENCOUNTER
[facility] HEALTH AIDE 4 weeks received via fax     Forms in  mail box for review and signature.

## 2022-05-05 ENCOUNTER — MEDICAL CORRESPONDENCE (OUTPATIENT)
Dept: HEALTH INFORMATION MANAGEMENT | Facility: CLINIC | Age: 87
End: 2022-05-05

## 2022-05-06 ENCOUNTER — PATIENT OUTREACH (OUTPATIENT)
Dept: NURSING | Facility: CLINIC | Age: 87
End: 2022-05-06
Payer: MEDICARE

## 2022-05-06 NOTE — PROGRESS NOTES
Clinic Care Coordination Contact    Community Health Worker Follow Up    Care Gaps:     Health Maintenance Due   Topic Date Due     HF ACTION PLAN  Never done     MICROALBUMIN  Never done     ZOSTER IMMUNIZATION (1 of 2) Never done     LIPID  01/06/2022     COVID-19 Vaccine (4 - Booster for Moderna series) 02/16/2022     TSH W/FREE T4 REFLEX  03/24/2022       Patient was focused on addressing their current goals.     Goals:    Goals Addressed as of 5/9/2022 at 9:22 AM                    5/6/22    3/24/22       4. Medical (pt-stated)   90%      Added 9/10/20 by Rogelio Gaming, ELIESER      Goal Statement: I want my skin tears to heal within 18 months.  Date Goal set: 9.10.2020 (extended 8/31/2021, 10/1/2021, 12/7/2021). Extended 3/16/2022  Barriers: Medical conditions impacting fragility, many opportunities for injury.  Strengths: Strong support system, recognition of need, coordination with PCP office to manage cares and needs.  Date to Achieve By: 9.16.2022  Patient expressed understanding of goal: Pt reports understanding and denies any additional questions or concerns at this times. JOSIAS CC engaged in AIDET communication during encounter.    Action steps to achieve this goal:  1. I will manage wound care until PCP appointment.  2. I will follow-up with primary care regarding plan of care.  3. I will follow wound care recommendations until healed.             5. Medical (pt-stated)   90%  On track    Added 3/24/22 by Rogelio Gaming, MICHAELSW      Goal Statement: I want to start receiving home care services within one month.   Date Goal set: 3/24/2022  Barriers: Navigating referral and eligibility for insurance coverage.   Strengths: Recognition of need, Care team support, strong support system.   Date to Achieve By: 4/24/2022  Patient expressed understanding of goal: Pt reports understanding and denies any additional questions or concerns at this times. JOSIAS CC engaged in AIDET communication during  encounter.    Action steps to achieve this goal:  1. I will be referred to home care.   2. I will be contacted to establish services.  3. I will establish home care and receive assistance.      5-9, CHW:    Patient reports that she is very happy with the Homecare she is receiving. Reports clear communication on their end, patient knows when they are coming for the rest of the month.     Patient does not know about long-term care plans after the episode with Homecare resolves to make sure that patient has access to assistance with showering.               Intervention and Education during outreach: Marie requested that CC reaches out to her daughter, Margaret, for the next outreach, as Margaret will know next steps moving forward. Patient reports that she is not very savvy with planning these things.     CHW Plan: Writer will reach out to Margaret in 2 weeks to touch base.       Mera Mendez B.S. Public Health  Community Health Worker  [facility]  Clinic Care Coordination  [phone]  -----------------------------------------------------------------------  Next outreach: 3/3/2022  Preferred contact: [phone] (Marie)  Margaret: [phone]     Enrollment:10/1/2021  Last Assessment: 1/25/2022  Frequency: Monthly

## 2022-05-13 ENCOUNTER — PATIENT OUTREACH (OUTPATIENT)
Dept: CARE COORDINATION | Facility: CLINIC | Age: 87
End: 2022-05-13
Payer: MEDICARE

## 2022-05-13 ASSESSMENT — ACTIVITIES OF DAILY LIVING (ADL): DEPENDENT_IADLS:: CLEANING;SHOPPING;TRANSPORTATION

## 2022-05-13 NOTE — PROGRESS NOTES
Care Coordination Clinician Chart Review     Situation: Patient chart reviewed by care coordinator.?     Background: Initial assessment and enrollment to Care Coordination was 5/2019.?? Patient centered goals were developed with participation from patient.? Lead CC handed patient off to CHW for continued outreach every 30 days.??     Assessment: Per chart review, patient outreach completed by CC CHW on 5/6/2022.? Patient is actively working to accomplish goal(s).? Patient's goal(s) remain(s) appropriate at this time.? Patient is not due for updated Plan of Care.? Annual assessment will be due 9/2022.      Goals        Patient Stated       4. Medical (pt-stated)       Goal Statement: I want my skin tears to heal within 18 months.  Date Goal set: 9.10.2020 (extended 8/31/2021, 10/1/2021, 12/7/2021). Extended 3/16/2022  Barriers: Medical conditions impacting fragility, many opportunities for injury.  Strengths: Strong support system, recognition of need, coordination with PCP office to manage cares and needs.  Date to Achieve By: 9.16.2022  Patient expressed understanding of goal: Pt reports understanding and denies any additional questions or concerns at this times. JOSIAS CC engaged in AIDET communication during encounter.    Action steps to achieve this goal:  1. I will manage wound care until PCP appointment.  2. I will follow-up with primary care regarding plan of care.  3. I will follow wound care recommendations until healed.             5. Medical (pt-stated)       Goal Statement: I want to start receiving home care services within one month.   Date Goal set: 3/24/2022  Barriers: Navigating referral and eligibility for insurance coverage.   Strengths: Recognition of need, Care team support, strong support system.   Date to Achieve By: 4/24/2022  Patient expressed understanding of goal: Pt reports understanding and denies any additional questions or concerns at this times. JOSIAS CC engaged in AIDET communication during  encounter.    Action steps to achieve this goal:  1. I will be referred to home care.   2. I will be contacted to establish services.  3. I will establish home care and receive assistance.            ??     Plan/Recommendations: The patient will continue working with Care Coordination to achieve above goal(s).? CHW will involve Lead CC as needed or if patient is ready to move to maintenance.? Lead CC will continue to monitor CHW s monthly outreaches and progress to goal(s) every 6 weeks.?     Plan of Care updated and sent to patient: No    YESSI Vela  Clinic Care Coordinator  Ph. [phone]  [email or web address]

## 2022-05-16 ENCOUNTER — ANCILLARY PROCEDURE (OUTPATIENT)
Dept: GENERAL RADIOLOGY | Facility: CLINIC | Age: 87
End: 2022-05-16
Attending: PHYSICIAN ASSISTANT
Payer: MEDICARE

## 2022-05-16 ENCOUNTER — OFFICE VISIT (OUTPATIENT)
Dept: INTERNAL MEDICINE | Facility: CLINIC | Age: 87
End: 2022-05-16
Payer: MEDICARE

## 2022-05-16 ENCOUNTER — NURSE TRIAGE (OUTPATIENT)
Dept: INTERNAL MEDICINE | Facility: CLINIC | Age: 87
End: 2022-05-16
Payer: MEDICARE

## 2022-05-16 ENCOUNTER — TELEPHONE (OUTPATIENT)
Dept: PHARMACY | Facility: CLINIC | Age: 87
End: 2022-05-16
Payer: MEDICARE

## 2022-05-16 VITALS
DIASTOLIC BLOOD PRESSURE: 50 MMHG | OXYGEN SATURATION: 96 % | WEIGHT: 86 LBS | SYSTOLIC BLOOD PRESSURE: 120 MMHG | HEART RATE: 57 BPM | TEMPERATURE: 97.7 F | RESPIRATION RATE: 16 BRPM | BODY MASS INDEX: 17.37 KG/M2

## 2022-05-16 DIAGNOSIS — R14.0 ABDOMINAL BLOATING: ICD-10-CM

## 2022-05-16 DIAGNOSIS — E03.9 ACQUIRED HYPOTHYROIDISM: ICD-10-CM

## 2022-05-16 DIAGNOSIS — R19.7 DIARRHEA, UNSPECIFIED TYPE: Primary | ICD-10-CM

## 2022-05-16 DIAGNOSIS — D69.6 THROMBOCYTOPENIA (H): ICD-10-CM

## 2022-05-16 DIAGNOSIS — R23.3 EASY BRUISING: ICD-10-CM

## 2022-05-16 DIAGNOSIS — R19.7 DIARRHEA, UNSPECIFIED TYPE: ICD-10-CM

## 2022-05-16 LAB
ERYTHROCYTE [DISTWIDTH] IN BLOOD BY AUTOMATED COUNT: 17.5 % (ref 10–15)
HCT VFR BLD AUTO: 35 % (ref 35–47)
HGB BLD-MCNC: 11 G/DL (ref 11.7–15.7)
MCH RBC QN AUTO: 30.4 PG (ref 26.5–33)
MCHC RBC AUTO-ENTMCNC: 31.4 G/DL (ref 31.5–36.5)
MCV RBC AUTO: 97 FL (ref 78–100)
PLATELET # BLD AUTO: 99 10E3/UL (ref 150–450)
RBC # BLD AUTO: 3.62 10E6/UL (ref 3.8–5.2)
WBC # BLD AUTO: 99.6 10E3/UL (ref 4–11)

## 2022-05-16 PROCEDURE — 74019 RADEX ABDOMEN 2 VIEWS: CPT | Mod: TC | Performed by: RADIOLOGY

## 2022-05-16 PROCEDURE — 36415 COLL VENOUS BLD VENIPUNCTURE: CPT | Performed by: PHYSICIAN ASSISTANT

## 2022-05-16 PROCEDURE — 85027 COMPLETE CBC AUTOMATED: CPT | Performed by: PHYSICIAN ASSISTANT

## 2022-05-16 PROCEDURE — 84481 FREE ASSAY (FT-3): CPT | Performed by: PHYSICIAN ASSISTANT

## 2022-05-16 PROCEDURE — 99214 OFFICE O/P EST MOD 30 MIN: CPT | Performed by: PHYSICIAN ASSISTANT

## 2022-05-16 PROCEDURE — 80053 COMPREHEN METABOLIC PANEL: CPT | Performed by: PHYSICIAN ASSISTANT

## 2022-05-16 PROCEDURE — 84443 ASSAY THYROID STIM HORMONE: CPT | Performed by: PHYSICIAN ASSISTANT

## 2022-05-16 PROCEDURE — 84439 ASSAY OF FREE THYROXINE: CPT | Performed by: PHYSICIAN ASSISTANT

## 2022-05-16 NOTE — TELEPHONE ENCOUNTER
Called patient to follow-up. Left message for patient to call back.    Have not been able to connect with patient after multiple attempts.  No further attempts will be made at this time.  I would be happy to assist in the care of this patient in the future if needed.    Yuliana Cast , Pharm D  [phone] (phone)  [phone] (pager)  Medication Therapy Management Pharmacist

## 2022-05-16 NOTE — PROGRESS NOTES
Assessment & Plan     Diarrhea, unspecified type  Labs, x-ray, and stool testing today. Has had this in the past without an infectious cause identified. Will test for C diff since she was on antibiotics approx 7 weeks ago. Continue to drink fluids.  - XR Abdomen 2 Views; Future  - Comprehensive metabolic panel (BMP + Alb, Alk Phos, ALT, AST, Total. Bili, TP); Future  - Enteric Bacteria and Virus Panel by [facility] Stool; Future  - Clostridium difficile Toxin B PCR; Future  - Comprehensive metabolic panel (BMP + Alb, Alk Phos, ALT, AST, Total. Bili, TP)  - Enteric Bacteria and Virus Panel by [facility] Stool  - Clostridium difficile Toxin B PCR    Abdominal bloating  See above. No convincing evidence of fecal impaction on imaging today.    Acquired hypothyroidism  Recheck thyroid labs, given her diarrhea. Is on 75 mcg of levothyroxine (dose may be a little too high considering her weight is 86 pounds). Last TSH was normal (0.81) in March 2021.  - TSH; Future  - T4, free; Future  - T3, Free; Future  - TSH  - T4, free  - T3, Free    Easy bruising  Recheck platelet count today. Discussed that her previous count was not low enough to warrant a platelet transfusion. Discussed that her skin is very thin and she is likely to bruise with very minimal trauma.  - CBC with platelets; Future  - CBC with platelets    Thrombocytopenia (H)  See above  - CBC with platelets; Future  - CBC with platelets    Review of the result(s) of each unique test - x-ray abdomen  Ordering of each unique test  30 minutes spent on the date of the encounter doing chart review, history and exam, documentation and further activities per the note      No follow-ups on file.    [facility] MARK Salazar is a 89 year old who presents for the following health issues  accompanied by her son.    HPI     Diarrhea  Patient here with complains of episodes of diarrhea since Friday stools after every eating and  occasionally at night - stomach upset .   No noted fevers      Call received from patient stating she received a missed phone call from Dr. Dumont. States she has been really sick and just got up. States she has not been abke to keep anything down. She has been sick since Friday. She has been able to keep water down. States she weighed herself today and was down to 84 pounds. States she normally weighs 89-90 pounds. States everything she eats goes right though her. States Immodium and Pepto do not help. States the Pepto runs right through her and her stool is pink. She was taking Imodium 2 tablets twice daily but stopped because it wasn't helping. She is having about 6-7 diarrhea episodes per day. Diarrhea is watery with very little consistency. Feels bloated. Advised appointment. Scheduled.       Reason for Disposition    MODERATE diarrhea (e.g., 4-6 times / day more than normal) and present > 48 hours (2 days)    Additional Information    Negative: Shock suspected (e.g., cold/pale/clammy skin, too weak to stand, low BP, rapid pulse)    Negative: Difficult to awaken or acting confused (e.g., disoriented, slurred speech)    Negative: Sounds like a life-threatening emergency to the triager    Negative: Vomiting also present and worse than the diarrhea    Negative: Blood in stool and without diarrhea    Negative: SEVERE abdominal pain (e.g., excruciating) and present > 1 hour    Negative: SEVERE abdominal pain and age > 60    Negative: Bloody, black, or tarry bowel movements (Exception: chronic-unchanged black-grey bowel movements and is taking iron pills or Pepto-bismol)    Negative: SEVERE diarrhea (e.g., 7 or more times / day more than normal) and age > 60 years    Negative: Constant abdominal pain lasting > 2 hours    Negative: Drinking very little and has signs of dehydration (e.g., no urine > 12 hours, very dry mouth, very lightheaded)    Negative: Patient sounds very sick or weak to the triager    Negative:  SEVERE diarrhea (e.g., 7 or more times / day more than normal) and present > 24 hours (1 day)    Protocols used: DIARRHEA-A-OH     Provider note:  Doesn't feel faint  No fevers  Felt warm this morning. Temp was normal  (98.6)  No known sick contacts  Can't think of any specific foods that cause problems  Prior to onset of diarrhea, had had some constipation. States that her stools are never really normal. She fluctuates between constipation and diarrhea  Has had stool testing in the past, as recent as last summer, when she was experiencing diarrhea. Was negative for infectious causes at that time.  Last took antibiotics (azithromycin) at the end of March 2022.  Liquid stool, no blood  Abdomen feels bloated  Has problems with dizziness (not a new problem). Feels like she is moving when she is laying down; worse when she turns her head.  Reports normal urination    Has had abdominal surgery (hysterectomy, appendectomy)    She also asks about her skin on her legs. Gets a lot of bruises.   Gets infusions for CLL.  Last platelet count was slightly low.    Review of Systems   Constitutional, HEENT, cardiovascular, pulmonary, gi and gu systems are negative, except as otherwise noted.      Objective    /50   Pulse 57   Temp 97.7  F (36.5  C) (Oral)   Resp 16   Wt 39 kg (86 lb)   LMP  (LMP Unknown)   SpO2 96%   BMI 17.37 kg/m    Body mass index is 17.37 kg/m .  Physical Exam   GENERAL: alert, no distress, frail and elderly  EYES: Eyes grossly normal to inspection and conjunctivae and sclerae normal  RESP: lungs clear to auscultation - no rales, rhonchi or wheezes  CV: regular rate and rhythm, normal S1 S2, no S3 or S4, no murmur, click or rub, no peripheral edema and peripheral pulses strong  ABDOMEN: tenderness suprapubic, RLQ and LLQ and bowel sounds hyperactive  MS: muscle wasting  SKIN: Skin is very thin. Purpuric lesions on anterior shins bilaterally.    No results found. However, due to the size of the  patient record, not all encounters were searched. Please check Results Review for a complete set of results.     Imaging (reviewed and interpreted by me): Nonobstructed bowel gas pattern. No evidence of significant stool in the rectum.

## 2022-05-16 NOTE — TELEPHONE ENCOUNTER
Call received from patient stating she received a missed phone call from Dr. Dumont. States she has been really sick and just got up. States she has not been abke to keep anything down. She has been sick since Friday. She has been able to keep water down. States she weighed herself today and was down to 84 pounds. States she normally weighs 89-90 pounds. States everything she eats goes right though her. States Immodium and Pepto do not help. States the Pepto runs right through her and her stool is pink. She was taking Imodium 2 tablets twice daily but stopped because it wasn't helping. She is having about 6-7 diarrhea episodes per day. Diarrhea is watery with very little consistency. Feels bloated. Advised appointment. Scheduled.      Reason for Disposition    MODERATE diarrhea (e.g., 4-6 times / day more than normal) and present > 48 hours (2 days)    Additional Information    Negative: Shock suspected (e.g., cold/pale/clammy skin, too weak to stand, low BP, rapid pulse)    Negative: Difficult to awaken or acting confused (e.g., disoriented, slurred speech)    Negative: Sounds like a life-threatening emergency to the triager    Negative: Vomiting also present and worse than the diarrhea    Negative: Blood in stool and without diarrhea    Negative: SEVERE abdominal pain (e.g., excruciating) and present > 1 hour    Negative: SEVERE abdominal pain and age > 60    Negative: Bloody, black, or tarry bowel movements (Exception: chronic-unchanged black-grey bowel movements and is taking iron pills or Pepto-bismol)    Negative: SEVERE diarrhea (e.g., 7 or more times / day more than normal) and age > 60 years    Negative: Constant abdominal pain lasting > 2 hours    Negative: Drinking very little and has signs of dehydration (e.g., no urine > 12 hours, very dry mouth, very lightheaded)    Negative: Patient sounds very sick or weak to the triager    Negative: SEVERE diarrhea (e.g., 7 or more times / day more than normal)  and present > 24 hours (1 day)    Protocols used: DIARRHEA-A-OH

## 2022-05-17 LAB — T3FREE SERPL-MCNC: 1.7 PG/ML (ref 2.3–4.2)

## 2022-05-18 DIAGNOSIS — G47.9 SLEEP DIFFICULTIES: Primary | ICD-10-CM

## 2022-05-18 LAB
ALBUMIN SERPL-MCNC: 3.9 G/DL (ref 3.4–5)
ALP SERPL-CCNC: 116 U/L (ref 40–150)
ALT SERPL W P-5'-P-CCNC: 14 U/L (ref 0–50)
ANION GAP SERPL CALCULATED.3IONS-SCNC: 4 MMOL/L (ref 3–14)
AST SERPL W P-5'-P-CCNC: 23 U/L (ref 0–45)
BILIRUB SERPL-MCNC: 0.5 MG/DL (ref 0.2–1.3)
BUN SERPL-MCNC: 15 MG/DL (ref 7–30)
CALCIUM SERPL-MCNC: 9.2 MG/DL (ref 8.5–10.1)
CHLORIDE BLD-SCNC: 99 MMOL/L (ref 94–109)
CO2 SERPL-SCNC: 30 MMOL/L (ref 20–32)
CREAT SERPL-MCNC: 0.96 MG/DL (ref 0.52–1.04)
GFR SERPL CREATININE-BSD FRML MDRD: 56 ML/MIN/1.73M2
GLUCOSE BLD-MCNC: 98 MG/DL (ref 70–99)
POTASSIUM BLD-SCNC: 4.3 MMOL/L (ref 3.4–5.3)
PROT SERPL-MCNC: 6.7 G/DL (ref 6.8–8.8)
SODIUM SERPL-SCNC: 133 MMOL/L (ref 133–144)
T4 FREE SERPL-MCNC: 1.02 NG/DL (ref 0.76–1.46)
TSH SERPL DL<=0.005 MIU/L-ACNC: 12.93 MU/L (ref 0.4–4)

## 2022-05-18 RX ORDER — TRAZODONE HYDROCHLORIDE 50 MG/1
TABLET, FILM COATED ORAL
Qty: 90 TABLET | Refills: 0 | Status: SHIPPED | OUTPATIENT
Start: 2022-05-18 | End: 2022-06-09

## 2022-05-18 NOTE — TELEPHONE ENCOUNTER
Please advise on diagnosis for medication         If PCP needs to have team do something (appointment etc ) please route to your team - thank you     Thank you     Nadine Jaquez RN, BSN  [facility]

## 2022-05-19 PROCEDURE — 87506 IADNA-DNA/RNA PROBE TQ 6-11: CPT | Performed by: PHYSICIAN ASSISTANT

## 2022-05-23 DIAGNOSIS — E03.9 ACQUIRED HYPOTHYROIDISM: Primary | ICD-10-CM

## 2022-05-23 RX ORDER — LEVOTHYROXINE SODIUM 88 UG/1
88 TABLET ORAL DAILY
Qty: 90 TABLET | Refills: 0 | Status: SHIPPED | OUTPATIENT
Start: 2022-05-23 | End: 2022-01-01

## 2022-06-02 ENCOUNTER — TELEPHONE (OUTPATIENT)
Dept: PHARMACY | Facility: CLINIC | Age: 87
End: 2022-06-02
Payer: MEDICARE

## 2022-06-02 ENCOUNTER — PATIENT OUTREACH (OUTPATIENT)
Dept: NURSING | Facility: CLINIC | Age: 87
End: 2022-06-02
Payer: MEDICARE

## 2022-06-02 NOTE — PROGRESS NOTES
Clinic Care Coordination Contact    Community Health Worker Follow Up    Care Gaps:     Health Maintenance Due   Topic Date Due     HF ACTION PLAN  Never done     MICROALBUMIN  Never done     ZOSTER IMMUNIZATION (1 of 2) Never done     LIPID  01/06/2022     COVID-19 Vaccine (4 - Booster for Moderna series) 02/16/2022       Margaret was focused on addressing current goals.     Goals:    Goals Addressed as of 6/2/2022 at 4:10 PM                    Today    5/6/22       4. Medical (pt-stated)   90%  90%    Added 9/10/20 by Rogelio Gaming, ELIESER      Goal Statement: I want my skin tears to heal within 18 months.  Date Goal set: 9.10.2020 (extended 8/31/2021, 10/1/2021, 12/7/2021). Extended 3/16/2022  Barriers: Medical conditions impacting fragility, many opportunities for injury.  Strengths: Strong support system, recognition of need, coordination with PCP office to manage cares and needs.  Date to Achieve By: 9.16.2022  Patient expressed understanding of goal: Pt reports understanding and denies any additional questions or concerns at this times. JOSIAS CC engaged in AIDET communication during encounter.    Action steps to achieve this goal:  1. I will manage wound care until PCP appointment.  2. I will follow-up with primary care regarding plan of care.  3. I will follow wound care recommendations until healed.      6-2, CHW:    No updates at this time.            5. Medical (pt-stated)   90%  90%    Added 3/24/22 by Rogelio Gaming, ELIESER      Goal Statement: I want to start receiving home care services within one month.   Date Goal set: 3/24/2022  Barriers: Navigating referral and eligibility for insurance coverage.   Strengths: Recognition of need, Care team support, strong support system.   Date to Achieve By: 4/24/2022  Patient expressed understanding of goal: Pt reports understanding and denies any additional questions or concerns at this times. JOSIAS CC engaged in AIDET communication during encounter.    Action  steps to achieve this goal:  1. I will be referred to home care.   2. I will be contacted to establish services.  3. I will establish home care and receive assistance.      6-2, CHW:     Margaret reported that C has ended since Margaret arranged patient to get Assisted Living support and services within their Independent Apartment.    Patient should be receiving shower services from them, however they have not gotten these services yet; it has been about a week. Margaret reported that she is planning to go to the patient's apartment to talk with management about ensuring that the additional services they are paying for get arranged.               Intervention and Education during outreach:  Writer and Margaret discussed [place] choices and having an assessment done in which, if patient is eligible, some services could be subsidized by a county waiver. Margaret is interested in this as they are paying for services OOP and patient does not have long-term care insurance. Patient is also paying for one meal a day at her facility. Writer inquired if Margaret would also be interested in meal delivery services. Margaret would like these resources (meal delivery and [place] Choices) sent via mail. Writer confirmed Margaret's address and will send via mail. Writer encouraged Margaret to reach out if needed; Margaret is also agreeable for CC to continue outreaches.     CHW Plan: Writer will reach out in 1 month to monitor the progression of the patient's goals.       KIM Encinas. Public Health  Community Health Worker  [place] [facility]  Clinic Care Coordination  [phone]  -----------------------------------------------------------------------  Next outreach: 7/5/2022  Preferred contact: [phone] Margaret: [phone]      Enrollment:10/1/2021  Last Assessment: 1/25/2022  Frequency: Monthly

## 2022-06-02 NOTE — LETTER
[facility] COORDIN[facility]  [facility]  June 2, 2022    Margaret Martines  [address] [place] [address]      Dear Margaret,    It's Mera, the clinic community health worker who works with Be Dumont MD with the [facility]. I wanted to thank you for spending the time to talk with me.  Here are some resources that may be beneficial to you:     Meal delivery services:    Meals on Wheels [phone] https://mealsTriada GamesonTriada GameswheelsMove Networks/   Mom's Meals [phone] Call or email: Intake department at [phone], option 1 or email [email or web address]   Optage [phone]  https://www.optage.org/ https://www.optage.org/senior-dining-services/[place]/dining-home-delivered-meals/  Senior Dining Choices provides home-delivered meals, funded by Title III, to older adults over 60 years of age and determined to be at a nutritional risk. Title III-eligible seniors may choose from the full menu and delivery options. Eligible seniors and support agencies may register and order meals by calling ([phone])[phone] or by email at orders@[employer].org.   [facility] Meal 1-635.366.5165 [email or web address]       What is a MnCHOICES assessment?       It is an in-person visit with a MnCHOICES certified  to learn about your needs, goals and preferences.    It helps to determine what kinds of supports you might be able to receive, including public programs that might pay for those services.    It helps you learn about and access other support options if you do not qualify for publicly funded programs.    It usually occurs where you live.    It is free to you.    It will occur within 20 calendar days of your request.        Call [phone] to request an assessment:  https://[place].gov/dhs/partners-and-providers/voei-apwhqttulsn-upcwxaz-workgroups/long-term-services-and-supports/mnchoices/      [facility] Services [facility]  MnCHOICES Assessment and Support Planning   [facility]  [facility]  [address], [place], [place], [address] [phone]  Email: [email or web address].      Who can have an assessment?  If you are a person of any age with a disability or in need of long-term services and supports, you may ask for a [place]CHOICES assessment. You do not need to be eligible for Medical Assistance or any other publicly funded program to receive an assessment. However, to receive many available services, you will need to be eligible for Medical Assistance or another publicly funded program.      How do I prepare for the assessment?  If you are interested in receiving publicly funded services, you may call your [facility] or [facility] to see if you are eligible for Medical Assistance.     Think about what is important to you, where and how you want to live or work and other activities you want to do.     Think about challenges, barriers and concerns you have in doing the things you like. What help do you feel you need? Does anyone help you now and, if so, with what?     Have a list of your medications or the medication bottles available to share with the .     The [facility]  will come to you, where you live, and will need a place to sit and a table to work on with his or her computer.     Most visits take about two hours.     Who may attend my assessment?     Family members, friends, your legal representative or other significant people in your life     With your permission, a paid service provider may share information in writing or by phone     What will the [facility]  ask me?     The assessment conversation might include:     Where and how you want to live, work and participate in your community     How you like to spend your time and with whom     Concerns or challenges that affect your ability to live as you choose     How you take care of your day-to-day personal needs, such as dressing, eating, bathing and getting around      How you manage your home and your physical and emotional health     What happens after the assessment? When will I know the results?     After the interview, the  will provide you a written summary of:     What he or she learned about you and your needs.      Please feel free to contact me with any questions or concerns care coordination and what we can offer.      We are focused on providing you with the highest-quality healthcare experience possible.    Sincerely,     KIM Encinas. Public Health  Community Health Worker  [facility]  Clinic Care Coordination  [phone]

## 2022-06-09 ENCOUNTER — VIRTUAL VISIT (OUTPATIENT)
Dept: PHARMACY | Facility: CLINIC | Age: 87
End: 2022-06-09
Payer: COMMERCIAL

## 2022-06-09 DIAGNOSIS — I50.32 CHRONIC DIASTOLIC (CONGESTIVE) HEART FAILURE (H): ICD-10-CM

## 2022-06-09 DIAGNOSIS — F32.A DEPRESSION, UNSPECIFIED DEPRESSION TYPE: ICD-10-CM

## 2022-06-09 DIAGNOSIS — G47.9 SLEEP DIFFICULTIES: ICD-10-CM

## 2022-06-09 DIAGNOSIS — I25.119 CORONARY ARTERY DISEASE INVOLVING NATIVE HEART WITH ANGINA PECTORIS, UNSPECIFIED VESSEL OR LESION TYPE (H): ICD-10-CM

## 2022-06-09 DIAGNOSIS — I10 ESSENTIAL HYPERTENSION WITH GOAL BLOOD PRESSURE LESS THAN 140/90: ICD-10-CM

## 2022-06-09 DIAGNOSIS — C91.10 CLL (CHRONIC LYMPHOCYTIC LEUKEMIA) (H): ICD-10-CM

## 2022-06-09 DIAGNOSIS — R19.7 DIARRHEA, UNSPECIFIED TYPE: ICD-10-CM

## 2022-06-09 DIAGNOSIS — F41.1 GAD (GENERALIZED ANXIETY DISORDER): Primary | ICD-10-CM

## 2022-06-09 DIAGNOSIS — E03.9 HYPOTHYROIDISM, UNSPECIFIED TYPE: ICD-10-CM

## 2022-06-09 PROCEDURE — 99605 MTMS BY PHARM NP 15 MIN: CPT | Performed by: PHARMACIST

## 2022-06-09 PROCEDURE — 99607 MTMS BY PHARM ADDL 15 MIN: CPT | Performed by: PHARMACIST

## 2022-06-09 RX ORDER — LOPERAMIDE HCL 2 MG
2 CAPSULE ORAL 3 TIMES DAILY PRN
Qty: 60 CAPSULE | Refills: 1 | Status: SHIPPED | OUTPATIENT
Start: 2022-06-09 | End: 2022-08-12

## 2022-06-09 RX ORDER — ESCITALOPRAM OXALATE 10 MG/1
10 TABLET ORAL DAILY
Qty: 30 TABLET | Refills: 3 | Status: SHIPPED | OUTPATIENT
Start: 2022-06-09 | End: 2022-01-01

## 2022-06-09 RX ORDER — TRAZODONE HYDROCHLORIDE 50 MG/1
50 TABLET, FILM COATED ORAL AT BEDTIME
Qty: 90 TABLET | Refills: 0 | COMMUNITY
Start: 2022-06-09 | End: 2022-01-01

## 2022-06-09 NOTE — PROGRESS NOTES
Medication Therapy Management (MTM) Encounter    ASSESSMENT:                            Medication Adherence/Access: No issues identified    COPD: stable    CLL:  stable    DHF/CAD: stable    Diarrhea:  Ordered Imodium for as use needed.  Recommended trial of Metamucil for bulking. Reviewed how to use today.    Hypothyroidism: adjusted dose as recommended after last labs.      Insomnia: stable    Depression/Anxiety:  Patient/family would like medication increased because she is very anxious.  Concerned with increasing sertraline as that may make diarrhea worse.  Will change to escitalopram.    PLAN:                            1.  Stop sertraline. Start escitalopram 10 mg daily.  2.  Refilled Imodium and start Metamucil       Follow-up: Return in about 3 weeks (around 6/30/2022) for Medication Therapy Management.    SUBJECTIVE/OBJECTIVE:                          Marie Kline is a 89 year old female called for a follow-up visit.  Today's visit is a follow-up MTM visit from 12/7/21.  Her daughter, Margaret ([phone]) joined us today.  First visit in 2022.     Reason for visit: medication review- very anxious and ongoing diarrhea    Allergies/ADRs: Reviewed in chart  Tobacco: She reports that she quit smoking about 26 years ago. Her smoking use included cigarettes. She has never used smokeless tobacco.  Alcohol: not currently using    Medication Adherence/Access: no issues reported      COPD: Current medications:  Proventil as needed, budesonide twice daily, Yupelri once daily and ipratropium-albuterol nebs three daily.    Wears oxygen 2L 24hrs.  Pt is not experiencing side effects.   Pt reports the following symptoms: shortness of breath, had chest x-ray and waiting on results.  No other symptoms at this time.  Pt does have an COPD Action Plan on file.   Has spirometry been completed: Yes   Followed by Marva Powell.    CLL: Getting Immune Globulin infusions every 6 weeks.  No concerns at this time.  Followed by  Oncology.    DHF/CAD: Current medications include metoprolol succinate 25 mg daily and  furosemide 40 mg on Monday and Friday, 20 mg daily the other day.      Weight at home slowly decreasing since having chronic diarrhea.  Diarrhea has improved with diet changes. Patient does not self-monitor BP.  Patient reports no current medication side effects.  BP Readings from Last 3 Encounters:   05/16/22 120/50   04/04/22 121/51   03/22/22 (!) 140/66       Diarrhea:  Weight at home slowly decreasing since having chronic diarrhea.  Diarrhea has improved with diet changes (followed BRAT diet).  Will taking Imodium as needed.  Asking for something else to bulk her up.    Hypothyroidism: Patient is taking levothyroxine 88 mcg daily (dose adjusted after last labs).  Patient is having the following symptoms: none.   TSH   Date Value Ref Range Status   05/16/2022 12.93 (H) 0.40 - 4.00 mU/L Final   03/24/2021 0.81 0.40 - 4.00 mU/L Final     T4 Free   Date Value Ref Range Status   12/09/2018 1.15 0.76 - 1.46 ng/dL Final     Free T4   Date Value Ref Range Status   05/16/2022 1.02 0.76 - 1.46 ng/dL Final       Insomnia: Current medications include: trazodone 50 mg at bedtime. Pt reports no issues. Sleeping well at night.  No side effects    Depression/Anxiety:  Current medications include: Sertraline 50 mg once daily.   Patient and daughter feel this should be increased.  Very anxious with a lot going on.  Always tired.  Having diarrhea issues.  PHQ-9 SCORE 2/17/2022   PHQ-9 Total Score [facility] 1 (Minimal depression)   PHQ-9 Total Score 1         Today's Vitals: LMP  (LMP Unknown)   ----------------      I spent 30 minutes with this patient today. All changes were made via collaborative practice agreement with Be Dumont MD. A copy of the visit note was provided to the patient's provider(s).    The patient was sent via [employer] a summary of these recommendations.     Yuliana Cast , Pharm D  [phone] (phone)  Medication  Therapy Management Pharmacist     Telemedicine Visit Details  Type of service:  Telephone visit  Start Time: 230pm  End Time: 300pm  Originating Location (patient location): Home  Distant Location (provider location):  [facility]     Medication Therapy Recommendations  Diarrhea, unspecified type    Current Medication: loperamide (IMODIUM) 2 MG capsule   Rationale: Synergistic therapy - Needs additional medication therapy - Indication   Status: Accepted - no CPA Needed         LESLY (generalized anxiety disorder)    Current Medication: sertraline (ZOLOFT) 50 MG tablet (Discontinued)   Rationale: Condition refractory to medication - Ineffective medication - Effectiveness   Status: Accepted per CPA

## 2022-06-10 NOTE — PATIENT INSTRUCTIONS
"Recommendations from today's MTM visit:                                                       1.  Stop sertraline. Start escitalopram 10 mg daily for anxiety.  We decided not to increase the dose of sertraline because we didn't want it to make your diarrhea symptoms worse.  2.  Refilled Imodium today .  Recommend starting Metamucil to help bulk up stools and reduce diarrhea.    Follow-up: Return in about 3 weeks (around 6/30/2022) for Medication Therapy Management.    It was great speaking with you today.  I value your experience and would be very thankful for your time in providing feedback in our clinic survey. In the next few days, you may receive an email or text message from [employer] with a link to a survey related to your  clinical pharmacist.\"     To schedule another MTM appointment, please call the clinic directly or you may call the MTM scheduling line at [phone] or toll-free at [phone].     My Clinical Pharmacist's contact information:                                                      Please feel free to contact me with any questions or concerns you have.      Yuliana Cast , Pharm D  [phone] (phone)  Medication Therapy Management Pharmacist     "

## 2022-06-27 ENCOUNTER — PATIENT OUTREACH (OUTPATIENT)
Dept: CARE COORDINATION | Facility: CLINIC | Age: 87
End: 2022-06-27

## 2022-06-27 NOTE — LETTER
[facility]  Patient Centered Plan of Care  About Me:        Patient Name:  Marie Kline    YOB: 1932  Age:         89 year old   [facility] MRN:    [id number] Telephone Information:  Home Phone [phone]   Mobile [phone]       Address:  [address] [place] [address]  Katherine Valderrama  [facility] [address] Email address:  [email or web address]      Emergency Contact(s)    Name Relationship Lgl Grd Work Phone Home Phone Mobile Phone   1. MARICARMEN CHANDLER* Daughter No  [phone]   2. EMILIA BUCHANAN Grandchild No  [phone]   3. IQRA, * Relative No   [phone]   4. MUNIR JACQUES* Grandchild No  [phone]   5. ROBIN KLINE Son    [phone]           Primary language:  English     needed? No   [facility]:  [phone] op. 1  Other communication barriers:None    Preferred Method of Communication:  Mail  Current living arrangement: I live alone; I live in assisted living    Mobility Status/ Medical Equipment: Independent w/Device        Health Maintenance  Health Maintenance Reviewed: Due/Overdue:  Microalbumin  Zoster's immunization  Lipid  Covid vaccine      My Access Plan  Medical Emergency 911   Primary Clinic Line [facility] - [phone]   24 Hour Appointment Line [phone] or  [phone]) (toll-free)   24 Hour Nurse Line [phone] (toll-free)   Preferred Urgent Care [facility] [place], [phone]     [facility]  [phone]     Preferred Pharmacy [id number] - [place], [place] - [address]     Behavioral Health Crisis Line The National Suicide Prevention Lifeline at 1-529.700.8262 or 911             My Care Team Members  Patient Care Team       Relationship Specialty Notifications Start End    Be Dumont MD PCP - General Internal Medicine  3/22/22     Phone: [phone] Fax:  [phone]         [address] [facility] [address]    Isabel Ivory MD MD Oncology  2/6/17     Phone: [phone] Fax: [phone]         [place] [facility]  [address] [facility] [address]    Rogelio Gaming LGSW Lead Care Coordinator   6/16/20     Kar Nuñez MD Assigned Surgical Provider   10/23/20     Phone: [phone] Fax: [phone] [address] [facility] [address]    Marva Powell MD Assigned Pulmonology Provider   5/2/21     Phone: [phone] Fax: [phone]         [address] [facility] [address] [facility] [address]    Sadiq Stein DPM Assigned Musculoskeletal Provider   5/16/21     Phone: [phone] Fax: [phone]         [address] [facility] [address]    eMra Mendez MA Community Health Worker   11/10/21     Be Dumont MD Assigned PCP   2/20/22     Phone: [phone] Fax: [phone]         [address] [place] [address]    Ksenia Silver, RN Specialty Care Coordinator Hematology & Oncology Admissions 4/11/22     Phone: [phone]         Yuliana Cast, [facility] Assigned MTM Pharmacist   5/28/22     Phone: [phone] Pager: [phone]         [address] [facility] [address] [facility] [address]    Yuliana Hahn, RN Lead Care Coordinator Primary Care - CC Admissions 6/27/22     Phone: [phone]                 My Care Plans    Goals and (Comments)   Goals        General     4. Medical (pt-stated)      Notes - Note edited  3/16/2022  9:03 AM by Rogelio Gaming LGSW     Goal Statement: I want my skin tears to heal within 18 months.  Date Goal set: 9.10.2020 (extended 8/31/2021, 10/1/2021, 12/7/2021). Extended 3/16/2022  Barriers: Medical conditions impacting fragility, many opportunities for injury.  Strengths: Strong support system, recognition of need, coordination with PCP office to manage cares and needs.  Date to Achieve By: 9.16.2022  Patient  expressed understanding of goal: Pt reports understanding and denies any additional questions or concerns at this times. SW CC engaged in AIDET communication during encounter.    Action steps to achieve this goal:  1. I will manage wound care until PCP appointment.  2. I will follow-up with primary care regarding plan of care.  3. I will follow wound care recommendations until healed.           5. Medical (pt-stated)      Notes - Note edited  6/27/2022  3:55 PM by Chichi Colmenares BSW     Goal Statement: I want to start receiving home care services within one month.   Date Goal set: 3/24/2022  Barriers: Navigating referral and eligibility for insurance coverage.   Strengths: Recognition of need, Care team support, strong support system.   Date to Achieve By: 4/24/2022, updated 8-30-22  Patient expressed understanding of goal: Pt reports understanding and denies any additional questions or concerns at this times. SW CC engaged in AIDET communication during encounter.    Action steps to achieve this goal:  1. I will be referred to home care.   2. I will be contacted to establish services.  3. I will establish home care and receive assistance.                 Action Plans on File:  None        COPD             Advance Care Plans/Directives Type:   POLST      My Medical and Care Information  Problem List   Patient Active Problem List   Diagnosis     Acute and chronic respiratory failure with hypercapnia (HCC)     Nonischemic cardiomyopathy (H)     Pneumonia     Acute myocardial infarction, initial episode of care (HCC)     CLL (chronic lymphocytic leukemia) (HCC)     CHF (congestive heart failure) (HCC)     Cardiomyopathy in other diseases classified elsewhere (HCC)     Hyperlipidemia with target LDL less than 130     Health Care Home     COPD exacerbation (H)     LESLY (generalized anxiety disorder)     Hypothyroidism     Back pain with radiation     DDD (degenerative disc disease), lumbar     Splenomegaly     Lumbar  degenerative disc disease     Lumbar foraminal stenosis     Central spinal stenosis     Bladder cancer (H)     Sepsis (H)     Senile osteoporosis     CKD (chronic kidney disease) stage 3, GFR 30-59 ml/min (H)     Purpura senilis (H)     Xerosis cutis     Malignant neoplasm of urinary bladder, unspecified site (H)     Hypoxia     Femoral neck fracture (H)     S/P total hip arthroplasty     NSTEMI (non-ST elevated myocardial infarction) (H)     Stress-induced cardiomyopathy     COPD with acute exacerbation (H)     Shoulder fracture     Mouth sores     Closed nondisplaced fracture of pelvis with routine healing, unspecified part of pelvis, subsequent encounter     Pelvic hematoma in female     Closed head injury, subsequent encounter     Multiple skin tears     Pneumonia of left lung due to infectious organism, unspecified part of lung     Chest pain     Coronary artery disease involving native heart with angina pectoris, unspecified vessel or lesion type (H)     Acquired hypogammaglobulinemia (H)     Chronic diastolic (congestive) heart failure (H)     Insomnia     Contusion of scalp, subsequent encounter     Essential (primary) hypertension     Fatigue     History of falling     Hypokalemia     Hyponatremia     Muscle weakness (generalized)     Other abnormalities of gait and mobility     Other injury of unspecified body region, subsequent encounter     Other symbolic dysfunctions     Weakness of left leg     Pulmonary nodules     Community acquired pneumonia, unspecified laterality     2019 novel coronavirus disease (COVID-19)      Current Medications and Allergies:  See printed Medication Report.    Care Coordination Start Date: 5/16/2019   Frequency of Care Coordination: monthly     Form Last Updated: 06/27/2022

## 2022-06-27 NOTE — PROGRESS NOTES
Care Coordination Clinician Chart Review     Situation: Patient chart reviewed by SW/care coordinator.?     Background:  Pt has been enrolled in Care Coordination and?patient centered goals were developed with participation from patient.? Lead CC handed patient off to CHW for continued outreach every 30 days.??     Assessment: Per chart review, patient outreach completed by CC CHW on 6-2-22..? Patient is actively working to accomplish goal(s).? Patient's goal(s) remain(s) appropriate at this time.? Patient is due for updated Plan of Care.?       Goals        4. Medical (pt-stated)       Goal Statement: I want my skin tears to heal within 18 months.  Date Goal set: 9.10.2020 (extended 8/31/2021, 10/1/2021, 12/7/2021). Extended 3/16/2022  Barriers: Medical conditions impacting fragility, many opportunities for injury.  Strengths: Strong support system, recognition of need, coordination with PCP office to manage cares and needs.  Date to Achieve By: 9.16.2022  Patient expressed understanding of goal: Pt reports understanding and denies any additional questions or concerns at this times. JOSIAS CC engaged in AIDET communication during encounter.    Action steps to achieve this goal:  1. I will manage wound care until PCP appointment.  2. I will follow-up with primary care regarding plan of care.  3. I will follow wound care recommendations until healed.             5. Medical (pt-stated)       Goal Statement: I want to start receiving home care services within one month.   Date Goal set: 3/24/2022  Barriers: Navigating referral and eligibility for insurance coverage.   Strengths: Recognition of need, Care team support, strong support system.   Date to Achieve By: 4/24/2022, updated 8-30-22  Patient expressed understanding of goal: Pt reports understanding and denies any additional questions or concerns at this times. JOSIAS CC engaged in AIDET communication during encounter.    Action steps to achieve this goal:  1. I will be  referred to home care.   2. I will be contacted to establish services.  3. I will establish home care and receive assistance.            ??     Plan/Recommendations: The patient will continue working with Care Coordination to achieve above goal(s).? CHW will involve Lead CC as needed or if patient is ready to move to maintenance.? Lead CC will continue to monitor CHW s monthly outreaches and progress to goal(s) every 6 weeks.?     Plan of Care updated and sent to patient: Yes    Farzad Colmenares, SW/CC, for Rogelio Casey. SW/CC  [facility]

## 2022-06-27 NOTE — LETTER
[facility]ARE [facility]ORDIN[facility]  [address]  [facility] [address]    June 29, 2022        Marie Kline  [address]  [facility] [address]          Dear Skyler Salazar is an updated Complex Care Plan for your continued enrollment in Care Coordination. Please let us know if you have additional questions, concerns, or goals that we can assist with.      YESSI Vela  Clinic Care Coordinator  Ph. [phone]  [email or web address]  Patient Centered Plan of Care  About Me:        Patient Name:  Marie Kline    YOB: 1932  Age:         89 year old   [place] MRN:    [id number] Telephone Information:  Home Phone [phone]   Mobile [phone]       Address:  [address]  [facility] [address] Email address:  [email or web address]      Emergency Contact(s)    Name Relationship Lgl Grd Work Phone Home Phone Mobile Phone   1. GERALDINE, PE* Daughter No  [phone]   2. EMILIA BUCHANAN Grandchild No  [phone]   3. WILLOWT, * Relative No   [phone]   4. SAWYER MUNIR* Grandchild No  [phone]   5. ROBIN KLINE Son    [phone]           Primary language:  English     needed? No   [place] [employer] [facility]:  [phone] op. 1  Other communication barriers:None    Preferred Method of Communication:  Mail  Current living arrangement: I live alone; I live in assisted living    Mobility Status/ Medical Equipment: Independent w/Device        Health Maintenance  Health Maintenance Reviewed: Due/Overdue   Health Maintenance Due   Topic Date Due     HF ACTION PLAN  Never done     MICROALBUMIN  Never done     ZOSTER IMMUNIZATION (1 of 2) Never done     LIPID  01/06/2022     COVID-19 Vaccine (4 - Booster for Moderna series) 02/16/2022           My Access Plan  Medical Emergency 911   Primary Clinic Line [facility] - [phone]    24 Hour Appointment Line [phone] or  [phone]) (toll-free)   24 Hour Nurse Line [phone] (toll-free)   Preferred Urgent Care [facility], [phone]     [facility]  [phone]     Preferred Pharmacy [id number] - [place], [place] - [address] [facility]     Behavioral Health Crisis Line The National Suicide Prevention Lifeline at 1-875.835.2662 or 911             My Care Team Members  Patient Care Team       Relationship Specialty Notifications Start End    Be Dumont MD PCP - General Internal Medicine  3/22/22     Phone: [phone] Fax: [phone]         [address] [facility] [address]    Isabel Ivory MD MD Oncology  2/6/17     Phone: [phone] Fax: [phone]         [place] [facility]  [address] [facility] [address]    Rogelio Gaming, [facility] Lead Care Coordinator   6/16/20     Kar Nuñez MD Assigned Surgical Provider   10/23/20     Phone: [phone] Fax: [phone] [address] [address]    Marva Powell MD Assigned Pulmonology Provider   5/2/21     Phone: [phone] Fax: [phone]         [address] [facility] [address] [facility] [address]    Sadiq Stein DPM Assigned Musculoskeletal Provider   5/16/21     Phone: [phone] Fax: [phone]         [address] [facility] [address]    Mera Mendez MA Community Health Worker   11/10/21     Be Dumont MD Assigned PCP   2/20/22     Phone: [phone] Fax: [phone]         [address] [facility] [address]    Ksenia Silver RN Specialty Care Coordinator Hematology & Oncology Admissions 4/11/22     Phone: [phone]         Yuliana Cast, [facility] Assigned MTM Pharmacist   5/28/22     Phone: [phone] Pager: [phone]         [address] [place] [address]    Yuliana Hahn, RN  Lead Care Coordinator Primary Care - CC Admissions 6/27/22     Phone: [phone]                 My Care Plans  Self Management and Treatment Plan  Goals and (Comments)   Goals        General     4. Medical (pt-stated)      Notes - Note edited  3/16/2022  9:03 AM by Rogelio Gaming LGSW     Goal Statement: I want my skin tears to heal within 18 months.  Date Goal set: 9.10.2020 (extended 8/31/2021, 10/1/2021, 12/7/2021). Extended 3/16/2022  Barriers: Medical conditions impacting fragility, many opportunities for injury.  Strengths: Strong support system, recognition of need, coordination with PCP office to manage cares and needs.  Date to Achieve By: 9.16.2022  Patient expressed understanding of goal: Pt reports understanding and denies any additional questions or concerns at this times. SW CC engaged in AIDET communication during encounter.    Action steps to achieve this goal:  1. I will manage wound care until PCP appointment.  2. I will follow-up with primary care regarding plan of care.  3. I will follow wound care recommendations until healed.           5. Medical (pt-stated)      Notes - Note edited  6/27/2022  3:55 PM by Chichi Colmenares, BSW     Goal Statement: I want to start receiving home care services within one month.   Date Goal set: 3/24/2022  Barriers: Navigating referral and eligibility for insurance coverage.   Strengths: Recognition of need, Care team support, strong support system.   Date to Achieve By: 4/24/2022, updated 8-30-22  Patient expressed understanding of goal: Pt reports understanding and denies any additional questions or concerns at this times. JOSIAS CC engaged in AIDET communication during encounter.    Action steps to achieve this goal:  1. I will be referred to home care.   2. I will be contacted to establish services.  3. I will establish home care and receive assistance.                 Action Plans on File:         COPD             Advance Care Plans/Directives Type:    POLST        Current Medications and Allergies:    Current Outpatient Medications   Medication Instructions     ACE/ARB/ARNI NOT PRESCRIBED (INTENTIONAL) Please choose reason not prescribed from choices below.     albuterol (PROAIR HFA/PROVENTIL HFA/VENTOLIN HFA) 108 (90 Base) MCG/ACT inhaler 2 puffs, Inhalation, EVERY 6 HOURS     aspirin 81 mg, Oral, EVERY EVENING     budesonide (PULMICORT) 0.5 mg, Nebulization, 2 TIMES DAILY     escitalopram (LEXAPRO) 10 mg, Oral, DAILY     ferrous sulfate (FEROSUL) 325 mg, Oral, DAILY WITH BREAKFAST     furosemide (LASIX) 20 MG tablet Two tabs Monday, Friday. One tab other days.     Immune Globulin, Human, (OCTAGAM) 5 GM/100ML SOLN 300 mg/kg into the vein every 6 weeks.<BR><BR>Hold this medication while in TCU-resume at discharge from TCU     levothyroxine (SYNTHROID/LEVOTHROID) 88 mcg, Oral, DAILY     loperamide (IMODIUM) 2 mg, Oral, 3 TIMES DAILY PRN     metoprolol succinate ER (TOPROL XL) 25 mg, Oral, DAILY     Multiple Vitamins-Minerals (MULTIVITAMIN ADULT) CHEW 2 chew tab, Oral, DAILY     psyllium (METAMUCIL) 28.3 % POWD Take 1 teaspoon by mouth daily, mix with 8 oz water     Revefenacin 175 mcg, Inhalation, DAILY     traZODone (DESYREL) 50 mg, Oral, AT BEDTIME         Care Coordination Start Date: 5/16/2019   Frequency of Care Coordination: monthly     Form Last Updated: 06/29/2022

## 2022-06-30 ENCOUNTER — VIRTUAL VISIT (OUTPATIENT)
Dept: PHARMACY | Facility: CLINIC | Age: 87
End: 2022-06-30
Payer: COMMERCIAL

## 2022-06-30 DIAGNOSIS — S92.406D CLOSED NONDISPLACED FRACTURE OF PHALANX OF GREAT TOE WITH ROUTINE HEALING, UNSPECIFIED LATERALITY, UNSPECIFIED PHALANX, SUBSEQUENT ENCOUNTER: Primary | ICD-10-CM

## 2022-06-30 DIAGNOSIS — F41.1 GAD (GENERALIZED ANXIETY DISORDER): ICD-10-CM

## 2022-06-30 DIAGNOSIS — F32.A DEPRESSION, UNSPECIFIED DEPRESSION TYPE: ICD-10-CM

## 2022-06-30 DIAGNOSIS — R19.7 DIARRHEA, UNSPECIFIED TYPE: ICD-10-CM

## 2022-06-30 PROCEDURE — 99606 MTMS BY PHARM EST 15 MIN: CPT | Performed by: PHARMACIST

## 2022-06-30 PROCEDURE — 99607 MTMS BY PHARM ADDL 15 MIN: CPT | Performed by: PHARMACIST

## 2022-06-30 NOTE — PATIENT INSTRUCTIONS
"Recommendations from today's MTM visit:                                                       Continue current regimen.    Follow-up: Return in about 3 months (around 9/30/2022) for Medication Therapy Management.    It was great speaking with you today.  I value your experience and would be very thankful for your time in providing feedback in our clinic survey. In the next few days, you may receive an email or text message from [facility] [employer] with a link to a survey related to your  clinical pharmacist.\"     To schedule another MTM appointment, please call the clinic directly or you may call the MTM scheduling line at [phone] or toll-free at [phone].     My Clinical Pharmacist's contact information:                                                      Please feel free to contact me with any questions or concerns you have.      Yuliana Cast , Pharm D  [phone] (phone)  Medication Therapy Management Pharmacist     "

## 2022-06-30 NOTE — PROGRESS NOTES
Medication Therapy Management (MTM) Encounter    ASSESSMENT:                            Medication Adherence/Access: No issues identified    Toe Fracture:  Patient doesn't feel she needs any pain medication at this time.  Will contact the clinic if pain gets worse or toe doesn't heal.    Diarrhea:  Continue current regimen     Depression/Anxiety:  Continue current regimen.  Patient prefers to continue on current medication.    PLAN:                            Continue current regimen.    Follow-up: Return in about 3 months (around 9/30/2022) for Medication Therapy Management.    SUBJECTIVE/OBJECTIVE:                          Marie Kline is a 89 year old female called for a follow-up visit.  Today's visit is a follow-up MTM visit from 6/9/22     Reason for visit: medication    Allergies/ADRs: Reviewed in chart  Tobacco: She reports that she quit smoking about 26 years ago. Her smoking use included cigarettes. She has never used smokeless tobacco.  Alcohol: not currently using    Medication Adherence/Access: no issues reported    Toe Fracture:  Dropped pan and broke her big toe.  Not taking anything for pain at this time.    Diarrhea:  Weight at home slowly decreasing since having chronic diarrhea.  Diarrhea has improved with diet changes (followed BRAT diet).  Will taking Imodium as needed and Metamucil.  No new concerns.      Depression/Anxiety:  Current medications include: escitalopram 10 mg daily.  Changed from sertraline 3 weeks ago - due to increased anxiety and diarrhea.  No significant change in mood since change.  No concerns with side effects.    PHQ-9 SCORE 2/17/2022   PHQ-9 Total Score MyChart 1 (Minimal depression)   PHQ-9 Total Score 1       LESLY-7 SCORE 12/19/2018 4/16/2020 1/6/2021   Total Score 12 0 2       Today's Vitals: LMP  (LMP Unknown)   ----------------      I spent 30 minutes with this patient today. All changes were made via collaborative practice agreement with Be Dumont MD. A  copy of the visit note was provided to the patient's provider(s).    The patient was sent via [employer] a summary of these recommendations.     Yuliana Cast , Pharm D  [phone] (phone)  Medication Therapy Management Pharmacist     Telemedicine Visit Details  Type of service:  Telephone visit  Start Time: 1245pm  End Time: 115pm  Originating Location (patient location): [place]  Distant Location (provider location):  [facility]     Medication Therapy Recommendations  No medication therapy recommendations to display

## 2022-07-18 ENCOUNTER — PATIENT OUTREACH (OUTPATIENT)
Dept: CARE COORDINATION | Facility: CLINIC | Age: 87
End: 2022-07-18

## 2022-07-18 DIAGNOSIS — K52.9 CHRONIC DIARRHEA: Primary | ICD-10-CM

## 2022-07-18 NOTE — PROGRESS NOTES
Clinic Care Coordination Contact  Community Health Worker Follow Up  Writer spoke with Margaret (Pt daughter, CTC).     Care Gaps:     Health Maintenance Due   Topic Date Due     HF ACTION PLAN  Never done     MICROALBUMIN  Never done     ZOSTER IMMUNIZATION (1 of 2) Never done     LIPID  01/06/2022     COVID-19 Vaccine (4 - Booster for Moderna series) 02/16/2022       Margaret was focused on addressing current goals.     Goals:    Goals Addressed as of 7/18/2022 at 12:08 PM                    Today    6/2/22       4. Medical (pt-stated)   100%  90%    Added 9/10/20 by Rogelio Gaming, SW      Goal Statement: I want my skin tears to heal within 18 months.  Date Goal set: 9.10.2020 (extended 8/31/2021, 10/1/2021, 12/7/2021). Extended 3/16/2022  Barriers: Medical conditions impacting fragility, many opportunities for injury.  Strengths: Strong support system, recognition of need, coordination with PCP office to manage cares and needs.  Date to Achieve By: 9.16.2022  Patient expressed understanding of goal: Pt reports understanding and denies any additional questions or concerns at this times. SW CC engaged in AIDET communication during encounter.    Action steps to achieve this goal:  1. I will manage wound care until PCP appointment.  2. I will follow-up with primary care regarding plan of care.  3. I will follow wound care recommendations until healed.      (Goal Completed)    7-18, CHW:    Margaret reports that Pt has had open wounds in the past, however they are currently healed up. Pt does experience a lot of bruising.            5. Medical (pt-stated)   100%  90%    Added 3/24/22 by Rogelio Gaming, LGSW      Goal Statement: I want to start receiving home care services within one month.   Date Goal set: 3/24/2022  Barriers: Navigating referral and eligibility for insurance coverage.   Strengths: Recognition of need, Care team support, strong support system.   Date to Achieve By: 4/24/2022, updated  "8-30-22  Patient expressed understanding of goal: Pt reports understanding and denies any additional questions or concerns at this times. [facility] engaged in AIDET communication during encounter.    Action steps to achieve this goal:  1. I will be referred to home care.   2. I will be contacted to establish services.  3. I will establish home care and receive assistance.      (Goal Completed)    7-18, CHW:    Margaret reports that Pt is still obtaining [facility] PT and OT.     Pt also recently transitioned to Assisted Living as of June 1st, 2022. Pt is able to access meals and assistance with showering x1 per week.            Financial Wellbeing (pt-stated)   0%      Added 7/18/22 by Mera Mendez MA      Goal Statement: I would like [place] a [place] Cho[place]   Date Goal set: 7.18.2022  Barriers: wait-time, needs to see if eligible  Strengths: Pt daughter, Margaret, assisting Pt with coordinating services  Date to Achieve By: 1.18.2023  Patient expressed understanding of goal: yes  Action steps to achieve this goal:  1. I will review the resources sent by CC CHW  2. I will utilize resources as I see fit  3. I will continue working with Care Coordination    7-18, CHW:    Margaret suspects that she misplaced the resources sent by CC CHW.              Intervention and Education during outreach: Writer will resend resources (meal deliver and [place] Choices information) to Margaret.     Writer inquired if Margaret had any additional questions or concerns. Margaret reported that their biggest worry relates to the Pts Chronic Diarrhea. Margaret shares that the Pt has been taking imodium for the past 2-3 weeks 3x a day. Margaret shares that this does help, however Pt continues to have \"outbreaks\" that interrupt Pts daily living. Margaret reports Pt missing a family members Birthday due to the episodes. Margaret also reports that this is negatively affecting Pts mental health. Margaret shares this has been persisting for over a year. Margaret is requesting " a GI specialist referral be placed to address Pts Chronic Diarrhea. If deemed appropriate, Margaret would like the contact information on the referral to be for her to handle any scheduling ([phone]). Writer will route Pts chart to Care Team  to review. Writer inquired if Margaret had any other questions or concerns, however she did not. Writer encouraged Margaret to reach out to  previous to next outreach with any questions or concerns; Magra[place] agreeable with plan.          CHW Plan: Writer will reach out to Margaret in 1 month to monitor the progression of the Pts goals.       KIM Encinas. Public Health  Community Health Worker  [facility] Care Coordination  [phone]  -----------------------------------------------------------------------  Next outreach: 8/18/2022  Preferred contact:  Margaret: [phone]      Enrollment:10/1/2021  Last Assessment: 1/25/2022  Frequency: Monthly

## 2022-07-29 DIAGNOSIS — I24.9 ACS (ACUTE CORONARY SYNDROME) (H): ICD-10-CM

## 2022-08-01 RX ORDER — METOPROLOL SUCCINATE 25 MG/1
25 TABLET, EXTENDED RELEASE ORAL DAILY
Qty: 90 TABLET | Refills: 2 | Status: SHIPPED | OUTPATIENT
Start: 2022-08-01

## 2022-08-01 NOTE — TELEPHONE ENCOUNTER
Pending Prescriptions:                       Disp   Refills    metoprolol succinate ER (TOPROL XL) 25 MG*90 tab*0            Sig: Take 1 tablet (25 mg) by mouth daily      Prescription approved per [facility] Refill Protocol.

## 2022-08-04 ENCOUNTER — TELEPHONE (OUTPATIENT)
Dept: INTERNAL MEDICINE | Facility: CLINIC | Age: 87
End: 2022-08-04

## 2022-08-04 ENCOUNTER — PATIENT OUTREACH (OUTPATIENT)
Dept: CARE COORDINATION | Facility: CLINIC | Age: 87
End: 2022-08-04

## 2022-08-04 ASSESSMENT — ACTIVITIES OF DAILY LIVING (ADL): DEPENDENT_IADLS:: CLEANING;SHOPPING;TRANSPORTATION

## 2022-08-04 NOTE — PROGRESS NOTES
Clinic Care Coordination Contact    Care Coordination Clinician Chart Review     Situation: Patient chart reviewed by   care coordinator.?     Background: Initial assessment and enrollment to Care Coordination was 5/2019.?? Patient centered goals were developed with participation from patient.? Lead CC handed patient off to CHW for continued outreach every 30 days.??      Assessment: Per chart review, patient outreach completed by CC CHW on 7/18/22.? Patient is actively working to accomplish goal(s).? Patient's goal(s) remain(s) appropriate at this time.? [place]  Pt is living in an Assisted Living. Patient is not due for updated Plan of Care.? Annual assessment will be due 5/2023.      Goals        Financial Wellbeing (pt-stated)       Goal Statement: I would like to [place]   Date Goal set: 7.18.2022  Barriers: wait-time, needs to see if eligible  Strengths: Pt daughter, Margaret, assisting Pt with coordinating services  Date to Achieve By: 1.18.2023  Patient expressed understanding of goal: yes  Action steps to achieve this goal:  1. I will review the resources sent by CC CHW  2. I will utilize resources as I see fit  3. I will continue working with Care Coordination          ??     Plan/Recommendations: The patient will continue working with Care Coordination to achieve above goal(s).? CHW will involve Lead CC as needed or if patient is ready to move to maintenance.? Lead CC will continue to monitor CHW s monthly outreaches and progress to goal(s) every 6 weeks.?     Plan of Care updated and sent to patient: No      TINO Kapoor / rupert Hahn RN  [facility] Primary Care   Care Coordination  [facility]  8/4/2022 10:57 AM

## 2022-08-04 NOTE — TELEPHONE ENCOUNTER
[facility] medical supply calling to get an order for a new nebulizer and supplies.  They will fax us an order form.

## 2022-08-05 ENCOUNTER — MEDICAL CORRESPONDENCE (OUTPATIENT)
Dept: HEALTH INFORMATION MANAGEMENT | Facility: CLINIC | Age: 87
End: 2022-08-05

## 2022-08-06 ENCOUNTER — HOSPITAL ENCOUNTER (INPATIENT)
Facility: CLINIC | Age: 87
LOS: 5 days | Discharge: NURSING, REHAB OR LONG-TERM CARE | DRG: 190 | End: 2022-08-11
Attending: EMERGENCY MEDICINE | Admitting: INTERNAL MEDICINE
Payer: MEDICARE

## 2022-08-06 ENCOUNTER — APPOINTMENT (OUTPATIENT)
Dept: GENERAL RADIOLOGY | Facility: CLINIC | Age: 87
DRG: 190 | End: 2022-08-06
Attending: EMERGENCY MEDICINE
Payer: MEDICARE

## 2022-08-06 DIAGNOSIS — J96.22 ACUTE ON CHRONIC RESPIRATORY FAILURE WITH HYPOXIA AND HYPERCAPNIA (H): ICD-10-CM

## 2022-08-06 DIAGNOSIS — J44.1 COPD EXACERBATION (H): ICD-10-CM

## 2022-08-06 DIAGNOSIS — M25.519 CHRONIC SHOULDER PAIN, UNSPECIFIED LATERALITY: Primary | ICD-10-CM

## 2022-08-06 DIAGNOSIS — J96.21 ACUTE ON CHRONIC RESPIRATORY FAILURE WITH HYPOXIA AND HYPERCAPNIA (H): ICD-10-CM

## 2022-08-06 DIAGNOSIS — G89.29 CHRONIC SHOULDER PAIN, UNSPECIFIED LATERALITY: Primary | ICD-10-CM

## 2022-08-06 DIAGNOSIS — J44.9 CHRONIC OBSTRUCTIVE PULMONARY DISEASE, UNSPECIFIED COPD TYPE (H): ICD-10-CM

## 2022-08-06 DIAGNOSIS — J44.1 COPD WITH ACUTE EXACERBATION (H): ICD-10-CM

## 2022-08-06 LAB
ANION GAP SERPL CALCULATED.3IONS-SCNC: 7 MMOL/L (ref 7–15)
BASE EXCESS BLDV CALC-SCNC: 8.4 MMOL/L (ref -7.7–1.9)
BASOPHILS # BLD MANUAL: 0 10E3/UL (ref 0–0.2)
BASOPHILS NFR BLD MANUAL: 0 %
BUN SERPL-MCNC: 9.3 MG/DL (ref 8–23)
CALCIUM SERPL-MCNC: 8.5 MG/DL (ref 8.2–9.6)
CHLORIDE SERPL-SCNC: 95 MMOL/L (ref 98–107)
CREAT SERPL-MCNC: 0.95 MG/DL (ref 0.51–0.95)
CREAT SERPL-MCNC: 0.96 MG/DL (ref 0.51–0.95)
DEPRECATED HCO3 PLAS-SCNC: 33 MMOL/L (ref 22–29)
EOSINOPHIL # BLD MANUAL: 0 10E3/UL (ref 0–0.7)
EOSINOPHIL NFR BLD MANUAL: 0 %
ERYTHROCYTE [DISTWIDTH] IN BLOOD BY AUTOMATED COUNT: 16 % (ref 10–15)
FLUAV RNA SPEC QL NAA+PROBE: NEGATIVE
FLUBV RNA RESP QL NAA+PROBE: NEGATIVE
GFR SERPL CREATININE-BSD FRML MDRD: 56 ML/MIN/1.73M2
GFR SERPL CREATININE-BSD FRML MDRD: 57 ML/MIN/1.73M2
GLUCOSE SERPL-MCNC: 88 MG/DL (ref 70–99)
HCO3 BLDV-SCNC: 37 MMOL/L (ref 21–28)
HCT VFR BLD AUTO: 33 % (ref 35–47)
HGB BLD-MCNC: 9.7 G/DL (ref 11.7–15.7)
LACTATE SERPL-SCNC: 0.5 MMOL/L (ref 0.7–2)
LYMPHOCYTES # BLD MANUAL: 68.5 10E3/UL (ref 0.8–5.3)
LYMPHOCYTES NFR BLD MANUAL: 91 %
MAGNESIUM SERPL-MCNC: 1.7 MG/DL (ref 1.7–2.3)
MCH RBC QN AUTO: 29.6 PG (ref 26.5–33)
MCHC RBC AUTO-ENTMCNC: 29.4 G/DL (ref 31.5–36.5)
MCV RBC AUTO: 101 FL (ref 78–100)
MONOCYTES # BLD MANUAL: 3 10E3/UL (ref 0–1.3)
MONOCYTES NFR BLD MANUAL: 4 %
NEUTROPHILS # BLD MANUAL: 3.8 10E3/UL (ref 1.6–8.3)
NEUTROPHILS NFR BLD MANUAL: 5 %
NT-PROBNP SERPL-MCNC: 1808 PG/ML (ref 0–1800)
O2/TOTAL GAS SETTING VFR VENT: 2 %
PCO2 BLDV: 74 MM HG (ref 40–50)
PH BLDV: 7.31 [PH] (ref 7.32–7.43)
PLAT MORPH BLD: ABNORMAL
PLATELET # BLD AUTO: 69 10E3/UL (ref 150–450)
PO2 BLDV: 26 MM HG (ref 25–47)
POTASSIUM SERPL-SCNC: 4.7 MMOL/L (ref 3.4–5.3)
RBC # BLD AUTO: 3.28 10E6/UL (ref 3.8–5.2)
RBC MORPH BLD: ABNORMAL
RSV RNA SPEC NAA+PROBE: NEGATIVE
SARS-COV-2 RNA RESP QL NAA+PROBE: NEGATIVE
SMUDGE CELLS BLD QL SMEAR: PRESENT
SODIUM SERPL-SCNC: 135 MMOL/L (ref 136–145)
TROPONIN T SERPL HS-MCNC: 60 NG/L
WBC # BLD AUTO: 75.3 10E3/UL (ref 4–11)

## 2022-08-06 PROCEDURE — 99285 EMERGENCY DEPT VISIT HI MDM: CPT | Mod: CS,25

## 2022-08-06 PROCEDURE — 250N000009 HC RX 250: Performed by: INTERNAL MEDICINE

## 2022-08-06 PROCEDURE — 250N000011 HC RX IP 250 OP 636

## 2022-08-06 PROCEDURE — 250N000011 HC RX IP 250 OP 636: Performed by: EMERGENCY MEDICINE

## 2022-08-06 PROCEDURE — 85027 COMPLETE CBC AUTOMATED: CPT | Performed by: EMERGENCY MEDICINE

## 2022-08-06 PROCEDURE — 84484 ASSAY OF TROPONIN QUANT: CPT | Performed by: EMERGENCY MEDICINE

## 2022-08-06 PROCEDURE — 94640 AIRWAY INHALATION TREATMENT: CPT

## 2022-08-06 PROCEDURE — 96365 THER/PROPH/DIAG IV INF INIT: CPT

## 2022-08-06 PROCEDURE — 36415 COLL VENOUS BLD VENIPUNCTURE: CPT | Performed by: INTERNAL MEDICINE

## 2022-08-06 PROCEDURE — 82565 ASSAY OF CREATININE: CPT | Performed by: INTERNAL MEDICINE

## 2022-08-06 PROCEDURE — 250N000009 HC RX 250: Performed by: EMERGENCY MEDICINE

## 2022-08-06 PROCEDURE — 250N000011 HC RX IP 250 OP 636: Performed by: INTERNAL MEDICINE

## 2022-08-06 PROCEDURE — 85007 BL SMEAR W/DIFF WBC COUNT: CPT | Performed by: EMERGENCY MEDICINE

## 2022-08-06 PROCEDURE — 83735 ASSAY OF MAGNESIUM: CPT | Performed by: INTERNAL MEDICINE

## 2022-08-06 PROCEDURE — 120N000001 HC R&B MED SURG/OB

## 2022-08-06 PROCEDURE — 82803 BLOOD GASES ANY COMBINATION: CPT | Performed by: EMERGENCY MEDICINE

## 2022-08-06 PROCEDURE — 250N000013 HC RX MED GY IP 250 OP 250 PS 637: Performed by: EMERGENCY MEDICINE

## 2022-08-06 PROCEDURE — 36415 COLL VENOUS BLD VENIPUNCTURE: CPT | Performed by: EMERGENCY MEDICINE

## 2022-08-06 PROCEDURE — 83605 ASSAY OF LACTIC ACID: CPT | Performed by: EMERGENCY MEDICINE

## 2022-08-06 PROCEDURE — 93005 ELECTROCARDIOGRAM TRACING: CPT

## 2022-08-06 PROCEDURE — 99223 1ST HOSP IP/OBS HIGH 75: CPT | Mod: AI | Performed by: INTERNAL MEDICINE

## 2022-08-06 PROCEDURE — 71046 X-RAY EXAM CHEST 2 VIEWS: CPT

## 2022-08-06 PROCEDURE — 999N000157 HC STATISTIC RCP TIME EA 10 MIN

## 2022-08-06 PROCEDURE — 96375 TX/PRO/DX INJ NEW DRUG ADDON: CPT

## 2022-08-06 PROCEDURE — 82310 ASSAY OF CALCIUM: CPT | Performed by: EMERGENCY MEDICINE

## 2022-08-06 PROCEDURE — 83880 ASSAY OF NATRIURETIC PEPTIDE: CPT | Performed by: EMERGENCY MEDICINE

## 2022-08-06 PROCEDURE — 250N000013 HC RX MED GY IP 250 OP 250 PS 637: Performed by: INTERNAL MEDICINE

## 2022-08-06 PROCEDURE — 87637 SARSCOV2&INF A&B&RSV AMP PRB: CPT | Performed by: EMERGENCY MEDICINE

## 2022-08-06 PROCEDURE — 999N000105 HC STATISTIC NO DOCUMENTATION TO SUPPORT CHARGE

## 2022-08-06 RX ORDER — PROCHLORPERAZINE MALEATE 5 MG
5 TABLET ORAL EVERY 6 HOURS PRN
Status: DISCONTINUED | OUTPATIENT
Start: 2022-08-06 | End: 2022-08-11 | Stop reason: HOSPADM

## 2022-08-06 RX ORDER — LOPERAMIDE HCL 2 MG
2 CAPSULE ORAL 3 TIMES DAILY PRN
Status: DISCONTINUED | OUTPATIENT
Start: 2022-08-06 | End: 2022-08-11 | Stop reason: HOSPADM

## 2022-08-06 RX ORDER — IPRATROPIUM BROMIDE AND ALBUTEROL SULFATE 2.5; .5 MG/3ML; MG/3ML
3 SOLUTION RESPIRATORY (INHALATION)
Status: DISCONTINUED | OUTPATIENT
Start: 2022-08-06 | End: 2022-08-11 | Stop reason: HOSPADM

## 2022-08-06 RX ORDER — FUROSEMIDE 20 MG
20 TABLET ORAL DAILY
COMMUNITY
End: 2023-01-01

## 2022-08-06 RX ORDER — BUDESONIDE 0.5 MG/2ML
0.5 INHALANT ORAL 2 TIMES DAILY
Status: DISCONTINUED | OUTPATIENT
Start: 2022-08-06 | End: 2022-08-11 | Stop reason: HOSPADM

## 2022-08-06 RX ORDER — LEVOFLOXACIN 500 MG/1
500 TABLET, FILM COATED ORAL ONCE
Status: COMPLETED | OUTPATIENT
Start: 2022-08-06 | End: 2022-08-06

## 2022-08-06 RX ORDER — LEVOFLOXACIN 250 MG/1
250 TABLET, FILM COATED ORAL EVERY OTHER DAY
Status: DISCONTINUED | OUTPATIENT
Start: 2022-08-08 | End: 2022-08-09

## 2022-08-06 RX ORDER — ACETAMINOPHEN 650 MG/1
650 SUPPOSITORY RECTAL EVERY 6 HOURS PRN
Status: DISCONTINUED | OUTPATIENT
Start: 2022-08-06 | End: 2022-08-11 | Stop reason: HOSPADM

## 2022-08-06 RX ORDER — ONDANSETRON 4 MG/1
4 TABLET, ORALLY DISINTEGRATING ORAL EVERY 6 HOURS PRN
Status: DISCONTINUED | OUTPATIENT
Start: 2022-08-06 | End: 2022-08-11 | Stop reason: HOSPADM

## 2022-08-06 RX ORDER — LEVOTHYROXINE SODIUM 88 UG/1
88 TABLET ORAL DAILY
Status: DISCONTINUED | OUTPATIENT
Start: 2022-08-07 | End: 2022-08-11 | Stop reason: HOSPADM

## 2022-08-06 RX ORDER — ENOXAPARIN SODIUM 100 MG/ML
40 INJECTION SUBCUTANEOUS EVERY 24 HOURS
Status: DISCONTINUED | OUTPATIENT
Start: 2022-08-06 | End: 2022-08-06

## 2022-08-06 RX ORDER — AZITHROMYCIN 500 MG/5ML
500 INJECTION, POWDER, LYOPHILIZED, FOR SOLUTION INTRAVENOUS ONCE
Status: DISCONTINUED | OUTPATIENT
Start: 2022-08-06 | End: 2022-08-06

## 2022-08-06 RX ORDER — METHYLPREDNISOLONE SODIUM SUCCINATE 125 MG/2ML
125 INJECTION, POWDER, LYOPHILIZED, FOR SOLUTION INTRAMUSCULAR; INTRAVENOUS ONCE
Status: COMPLETED | OUTPATIENT
Start: 2022-08-06 | End: 2022-08-06

## 2022-08-06 RX ORDER — DIPHENHYDRAMINE HYDROCHLORIDE 50 MG/ML
INJECTION INTRAMUSCULAR; INTRAVENOUS
Status: COMPLETED
Start: 2022-08-06 | End: 2022-08-06

## 2022-08-06 RX ORDER — IPRATROPIUM BROMIDE AND ALBUTEROL SULFATE 2.5; .5 MG/3ML; MG/3ML
3 SOLUTION RESPIRATORY (INHALATION) ONCE
Status: COMPLETED | OUTPATIENT
Start: 2022-08-06 | End: 2022-08-06

## 2022-08-06 RX ORDER — METOPROLOL SUCCINATE 25 MG/1
25 TABLET, EXTENDED RELEASE ORAL DAILY
Status: DISCONTINUED | OUTPATIENT
Start: 2022-08-07 | End: 2022-08-11 | Stop reason: HOSPADM

## 2022-08-06 RX ORDER — FUROSEMIDE 20 MG
40 TABLET ORAL
Status: DISCONTINUED | OUTPATIENT
Start: 2022-08-09 | End: 2022-08-09

## 2022-08-06 RX ORDER — ASPIRIN 81 MG/1
81 TABLET ORAL EVERY EVENING
Status: DISCONTINUED | OUTPATIENT
Start: 2022-08-07 | End: 2022-08-11 | Stop reason: HOSPADM

## 2022-08-06 RX ORDER — METHYLPREDNISOLONE SODIUM SUCCINATE 40 MG/ML
40 INJECTION, POWDER, LYOPHILIZED, FOR SOLUTION INTRAMUSCULAR; INTRAVENOUS EVERY 12 HOURS
Status: DISCONTINUED | OUTPATIENT
Start: 2022-08-06 | End: 2022-08-08

## 2022-08-06 RX ORDER — FUROSEMIDE 20 MG
40 TABLET ORAL
Status: ON HOLD | COMMUNITY
End: 2022-08-11

## 2022-08-06 RX ORDER — ESCITALOPRAM OXALATE 5 MG/1
10 TABLET ORAL DAILY
Status: DISCONTINUED | OUTPATIENT
Start: 2022-08-07 | End: 2022-08-11 | Stop reason: HOSPADM

## 2022-08-06 RX ORDER — CEFTRIAXONE 2 G/1
2 INJECTION, POWDER, FOR SOLUTION INTRAMUSCULAR; INTRAVENOUS ONCE
Status: COMPLETED | OUTPATIENT
Start: 2022-08-06 | End: 2022-08-06

## 2022-08-06 RX ORDER — FUROSEMIDE 20 MG
20 TABLET ORAL
Status: DISCONTINUED | OUTPATIENT
Start: 2022-08-07 | End: 2022-08-09

## 2022-08-06 RX ORDER — ACETAMINOPHEN 325 MG/1
650 TABLET ORAL EVERY 6 HOURS PRN
Status: DISCONTINUED | OUTPATIENT
Start: 2022-08-06 | End: 2022-08-11 | Stop reason: HOSPADM

## 2022-08-06 RX ORDER — FERROUS SULFATE 325(65) MG
325 TABLET ORAL
Status: DISCONTINUED | OUTPATIENT
Start: 2022-08-07 | End: 2022-08-11 | Stop reason: HOSPADM

## 2022-08-06 RX ORDER — TRAZODONE HYDROCHLORIDE 50 MG/1
50 TABLET, FILM COATED ORAL AT BEDTIME
Status: DISCONTINUED | OUTPATIENT
Start: 2022-08-06 | End: 2022-08-11 | Stop reason: HOSPADM

## 2022-08-06 RX ORDER — PROCHLORPERAZINE 25 MG
12.5 SUPPOSITORY, RECTAL RECTAL EVERY 12 HOURS PRN
Status: DISCONTINUED | OUTPATIENT
Start: 2022-08-06 | End: 2022-08-11 | Stop reason: HOSPADM

## 2022-08-06 RX ORDER — LEVOFLOXACIN 250 MG/1
750 TABLET, FILM COATED ORAL DAILY
Status: ON HOLD | COMMUNITY
End: 2022-08-11

## 2022-08-06 RX ORDER — ASPIRIN 81 MG/1
324 TABLET, CHEWABLE ORAL ONCE
Status: COMPLETED | OUTPATIENT
Start: 2022-08-06 | End: 2022-08-06

## 2022-08-06 RX ORDER — ONDANSETRON 2 MG/ML
4 INJECTION INTRAMUSCULAR; INTRAVENOUS EVERY 6 HOURS PRN
Status: DISCONTINUED | OUTPATIENT
Start: 2022-08-06 | End: 2022-08-11 | Stop reason: HOSPADM

## 2022-08-06 RX ORDER — LIDOCAINE 40 MG/G
CREAM TOPICAL
Status: DISCONTINUED | OUTPATIENT
Start: 2022-08-06 | End: 2022-08-11 | Stop reason: HOSPADM

## 2022-08-06 RX ADMIN — BUDESONIDE 0.5 MG: 0.5 INHALANT RESPIRATORY (INHALATION) at 20:03

## 2022-08-06 RX ADMIN — CEFTRIAXONE 2 G: 2 INJECTION, POWDER, FOR SOLUTION INTRAMUSCULAR; INTRAVENOUS at 12:58

## 2022-08-06 RX ADMIN — ASPIRIN 81 MG CHEWABLE TABLET 324 MG: 81 TABLET CHEWABLE at 15:25

## 2022-08-06 RX ADMIN — IPRATROPIUM BROMIDE AND ALBUTEROL SULFATE 3 ML: .5; 3 SOLUTION RESPIRATORY (INHALATION) at 15:27

## 2022-08-06 RX ADMIN — METHYLPREDNISOLONE SODIUM SUCCINATE 40 MG: 40 INJECTION, POWDER, FOR SOLUTION INTRAMUSCULAR; INTRAVENOUS at 21:15

## 2022-08-06 RX ADMIN — TRAZODONE HYDROCHLORIDE 50 MG: 50 TABLET ORAL at 22:18

## 2022-08-06 RX ADMIN — DIPHENHYDRAMINE HYDROCHLORIDE 25 MG: 50 INJECTION, SOLUTION INTRAMUSCULAR; INTRAVENOUS at 13:31

## 2022-08-06 RX ADMIN — METHYLPREDNISOLONE SODIUM SUCCINATE 125 MG: 125 INJECTION, POWDER, FOR SOLUTION INTRAMUSCULAR; INTRAVENOUS at 12:29

## 2022-08-06 RX ADMIN — LEVOFLOXACIN 500 MG: 500 TABLET, FILM COATED ORAL at 17:46

## 2022-08-06 ASSESSMENT — ACTIVITIES OF DAILY LIVING (ADL)
ADLS_ACUITY_SCORE: 30
ADLS_ACUITY_SCORE: 35
ADLS_ACUITY_SCORE: 30
ADLS_ACUITY_SCORE: 30
ADLS_ACUITY_SCORE: 35

## 2022-08-06 ASSESSMENT — ENCOUNTER SYMPTOMS
COUGH: 1
FEVER: 0
SHORTNESS OF BREATH: 1

## 2022-08-06 NOTE — H&P
[facility]    Hospitalist Admission Note    Name: Marie Kline    MRN: [id number]  YOB: 1932    Age: 89 year old  Date of admission: 8/6/2022  Primary care provider: Be Dumont  Admitting physician : Ramón Vazquez M.D. ,M.B.A.       Brief summary of admission assessment/MDM:    Marie Kline is a 89 year old  female with a significant past medical history of COPD on home oxygen, coronary artery disease and CHF who presents with productive cough and increased shortness of breath.She lives at the [facility].      Assessment and Plan       #1.Acute COPD exacerbation  -- H/o of COPD on home oxygen 2 LPM.Found hypoxic and more short of breath despite her oxygen at home.  -- afebrile but has chronically elevated wbc due to her known CLL  -- has frequent COPD flares , follows with Dr Powell     Plan :   -- continue nebs , iv steroids , Levaquin , supplemental oxygen     #2.Productive cough   -- no infiltrates on CXR ,was diagnosed with pneumonia and started on levaquin PTA but not clear if she took it.I suspect bacterial bronchitis   -- will continue levaquin   -- culture and gram stain sputum       #3.Elevated troponin   -- Trop T at 60( normal <14)  -- no chest pain or indication of ACS - Suspect demad ischemia . Echo 3/ 2021 preserved EF     Plan :  -- medical tele   -- trend trop and monitor closely as she has CAD     #4.  Malnutrition-BMI of 16, cachexia.    Chronic  comorbid medical conditions:    Pulmonary nodules    Hypertension- on Toprol     Insomnia    CHF- preserved EF     Hypogammaglobulinemia    CAD-Stable     Stress-induced cardiomyopathy- resolved     H/o Malignant neoplasm of urinary bladder    Xerosis cutis    Purpura senilis    CKD    Senile osteoporosis    Central spinal stenosis    Lumbar foraminal stenosis    Lumbar DDD    Hypothyroidism- on replacement     LESLY    Hyperlipidemia    CLL        # Admission Status: Will admit patient  to hospitalist service as inpatient as patient likely need over two mid night stay  in the hospital.    # Diet:Diet advanced    # Activity:Regular activity    # Education/Counseling :Discussed treatment plan with the patient    # Consults:Inpatient consult with     # VTE prophylactic measures:prophylaxis against venous thromboembolism with lovenox       # Code Status:DNR / DNI    # Disposition:anticipate discharge to skilled care facility and Anticipate discharge in 3 days        Disclaimer: This note consists of symbols derived from keyboarding, dictation and/or voice recognition software. As a result, there may be errors in the script that have gone undetected. Please consider this when interpreting information found in this chart.             Chief Complaint:     Cough , sob   History is obtained from the patient, electronic health record and emergency department physician          History of Present Illness:      This patient is a 89 year old  female with a significant past medical history of multiple medical problems  who presents with the following condition requiring a hospital admission:    Acute COPD exacerbation     Marie Kline is a 89 year old female with history of COPD, pneumonia, hypertension, NSTEMI, MI, and CHF who presents with shortness of breath. The patient is a poor historian. This morning she felt shortness of breath so she took her nebulizer with no relief. She has a productive cough. She was recently diagnosed with pneumonia and was prescribed  Levaquin . Her daughter, Margaret stated that her productive cough began 4 days ago. It is not clear if she took the antibiotics.    EMS report her oxygen was in the high 80's while on 2L of oxygen on the way to the ED. Denies chest pain, fever, or leg swelling.          Past Medical History:     Past Medical History:   Diagnosis Date     Arthritis     Generalized     Bladder cancer (H)      Bladder cancer (H)      Cardiomyopathy (H)       CLL (chronic lymphocytic leukemia) (H)      Congestive heart failure (H) 10/24/2014    flash pulm edema ass w/Takotsubo     COPD (chronic obstructive pulmonary disease) (H)     noc O2     Hypertension      Hypothyroid      Mumps      Myocardial infarction (H) 10/2014    Takotsubo presentation w/mild CAD     Pulmonary edema cardiac cause (H) 10/08/2014    associated w/Takotsubo ACS     Tricuspid valve disorders, 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    TR 2-3+ per echo            Past Surgical History:     Past Surgical History:   Procedure Laterality Date     BIOPSY LYMPH NODE INGUINAL Right 10/23/2018    Procedure: excsional biopsy right inguinal lymph node;  Surgeon: Reji Connors MD;  Location: [facility] OR     BLADDER SURGERY       CORONARY ANGIOGRAPHY ADULT ORDER  10/2014    minimal CAD     CV LEFT HEART CATH N/A 2018    Procedure: Left Heart Cath;  Surgeon: Sadiq Carrasco MD;  Location:  HEART CARDIAC CATH LAB     CYSTOSCOPY       CYSTOSCOPY, BIOPSY BLADDER, COMBINED N/A 2016    Procedure: COMBINED CYSTOSCOPY, BIOPSY BLADDER;  Surgeon: Kar Nñuez MD;  Location:  OR     CYSTOSCOPY, TRANSURETHRAL RESECTION (TUR) TUMOR BLADDER, COMBINED N/A 2016    Procedure: COMBINED CYSTOSCOPY, TRANSURETHRAL RESECTION (TUR) TUMOR BLADDER;  Surgeon: Kar Nuñez MD;  Location:  OR     ESOPHAGOSCOPY, GASTROSCOPY, DUODENOSCOPY (EGD), COMBINED N/A 2016    Procedure: COMBINED ESOPHAGOSCOPY, GASTROSCOPY, DUODENOSCOPY (EGD);  Surgeon: Freddie Diez MD;  Location:  GI     OPEN REDUCTION INTERNAL FIXATION HIP BIPOLAR Left 2018    Procedure: Left bipolar hemiarthroplasty;  Surgeon: Scar Reynolds MD;  Location:  OR             Social History:     Social History     Tobacco Use     Smoking status: Former Smoker     Types: Cigarettes     Quit date: 2/3/1996     Years since quittin.5     Smokeless tobacco: Never Used   Substance Use Topics     Alcohol use: No      Alcohol/week: 0.0 standard drinks             Family History:     Family History   Problem Relation Age of Onset     Cerebrovascular Disease Mother      Cancer Father           Allergies:     Allergies   Allergen Reactions     Diagnostic X-Ray Materials Hives     CT DYE     Contrast Dye Hives     CT DYE     Prolia [Denosumab]      Osteonecrosis jaw       Rocephin [Ceftriaxone] Itching     Wound Dressing Adhesive              Medications:        Prior to Admission medications    Medication Sig Last Dose Taking? Auth Provider Long Term End Date   ACE/ARB/ARNI NOT PRESCRIBED (INTENTIONAL) Please choose reason not prescribed from choices below.   Nehal Freeman, CNP Yes    albuterol (PROAIR HFA/PROVENTIL HFA/VENTOLIN HFA) 108 (90 Base) MCG/ACT inhaler Inhale 2 puffs into the lungs every 6 hours   Marva Powell MD Yes    aspirin 81 MG EC tablet Take 81 mg by mouth every evening    Unknown, Entered By History No    budesonide (PULMICORT) 0.5 MG/2ML neb solution Take 2 mLs (0.5 mg) by nebulization 2 times daily   Marva Powell MD Yes    escitalopram (LEXAPRO) 10 MG tablet Take 1 tablet (10 mg) by mouth daily   Be Dumont MD Yes    ferrous sulfate (IRON) 325 (65 FE) MG tablet Take 325 mg by mouth daily (with breakfast)    Reported, Patient     furosemide (LASIX) 20 MG tablet Two tabs Monday, Friday. One tab other days.   Chato Huang MD Yes    Immune Globulin, Human, (OCTAGAM) 5 GM/100ML SOLN 300 mg/kg into the vein every 6 weeks.    Hold this medication while in TCU-resume at discharge from TCU   Abstract, Provider     levothyroxine (SYNTHROID/LEVOTHROID) 88 MCG tablet Take 1 tablet (88 mcg) by mouth daily   Sis Brock PA-C Yes    loperamide (IMODIUM) 2 MG capsule Take 1 capsule (2 mg) by mouth 3 times daily as needed for diarrhea   Be Dumont MD     metoprolol succinate ER (TOPROL XL) 25 MG 24 hr tablet Take 1 tablet (25 mg) by mouth daily   Be Dumont MD Yes     Multiple Vitamins-Minerals (MULTIVITAMIN ADULT) CHEW Take 2 chew tab by mouth daily   Reported, Patient     psyllium (METAMUCIL) 28.3 % POWD Take 1 teaspoon by mouth daily, mix with 8 oz water   Be Dumont MD     Revefenacin 175 MCG/3ML SOLN Inhale 3 mLs (175 mcg) into the lungs daily   Marva Powell MD     traZODone (DESYREL) 50 MG tablet Take 1 tablet (50 mg) by mouth At Bedtime   Be Dumont MD Yes           Review of Systems:     A Comprehensive greater than 10 system review of systems was carried out.  Pertinent positives and negatives are noted above in HPI.  Otherwise negative for contributory information.           Physical Exam:     Vital signs were reviewed    Temp:  [98.8  F (37.1  C)] 98.8  F (37.1  C)  Pulse:  [55-67] 61  Resp:  [20-24] 20  BP: (116-136)/(57-81) 136/57  SpO2:  [97 %-100 %] 97 %        GEN: awake, alert, cooperative, no apparent distress, oriented x 3    NECK:Supple ,no mass or thyromegaly     HEENT:  Normocephalic/atraumatic, no scleral icterus, no nasal discharge, mouth moist.    CV:  Regular rate and rhythm, no murmur or JVD.  S1 + S2 noted, no S3 or S4.    LUNGS: no increased work of breathing. Wheezing anteriorly , diminished air entry posteriorly   ABD:  Active bowel sounds, soft, non-tender/non-distended.  No rebound/guarding/rigidity.    EXT:  No edema.  No cyanosis.  No joint synovitis noted.Lower extremity pulses are normal bilaterally and     LGS: No cervical or axillary lymphadenopathy     SKIN:  Dry to touch, warm ,no exanthems noted in the visualized areas.    Neurologic:Grossly intact,non focal . No acute focal neurologic deficit     Psychaitric exam: Mood and affect normal     Additional significant  Findings: emaciated              Data:       All laboratory and imaging data in the past 24 hours reviewed     Results for orders placed or performed during the hospital encounter of 08/06/22   XR Chest 2 Views     Status: None    Narrative     EXAM: XR CHEST 2 VIEWS  LOCATION: [facility]  DATE/TIME: 08/06/2022, 12:37 PM    INDICATION: Cough. Shortness of breath.  COMPARISON: 03/16/2022.      Impression    IMPRESSION: No significant interval change. Tortuous calcified thoracic aorta. Mild hyperinflation both lungs. Mild scarring or linear atelectasis in the left midlung. Heart size is stable. Pulmonary vascularity is within normal limits. No pleural   effusions. No pneumothorax.     Symptomatic; Auto-generated order Influenza A/B & SARS-CoV2 (COVID-19) Virus PCR Multiplex Nasopharyngeal     Status: Normal    Specimen: Nasopharyngeal; Swab   Result Value Ref Range    Influenza A PCR Negative Negative    Influenza B PCR Negative Negative    RSV PCR Negative Negative    SARS CoV2 PCR Negative Negative    Narrative    Testing was performed using the Xpert Xpress CoV2/Flu/RSV Assay on the Cepheid GeneXpert Instrument. This test should be ordered for the detection of SARS-CoV-2 and influenza viruses in individuals who meet clinical and/or epidemiological criteria. Test performance is unknown in asymptomatic patients. This test is for in vitro diagnostic use under the FDA EUA for laboratories certified under CLIA to perform high or moderate complexity testing. This test has not been FDA cleared or approved. A negative result does not rule out the presence of PCR inhibitors in the specimen or target RNA in concentration below the limit of detection for the assay. If only one viral target is positive but coinfection with multiple targets is suspected, the sample should be re-tested with another FDA cleared, approved, or authorized test, if coinfection would change clinical management. This test was validated by the [facility]. These laboratories are certified under the Clinical  Laboratory Improvement Amendments of 1988 (CLIA-88) as qualified to perform high complexity laboratory testing.   Troponin T, High Sensitivity      "Status: Abnormal   Result Value Ref Range    Troponin T, High Sensitivity 60 (H) <=14 ng/L   Basic metabolic panel     Status: Abnormal   Result Value Ref Range    Creatinine 0.96 (H) 0.51 - 0.95 mg/dL    Sodium 135 (L) 136 - 145 mmol/L    Potassium 4.7 3.4 - 5.3 mmol/L    Urea Nitrogen 9.3 8.0 - 23.0 mg/dL    Chloride 95 (L) 98 - 107 mmol/L    Carbon Dioxide (CO2) 33 (H) 22 - 29 mmol/L    Anion Gap 7 7 - 15 mmol/L    Glucose 88 70 - 99 mg/dL    GFR Estimate 56 (L) >60 mL/min/1.73m2    Calcium 8.5 8.2 - 9.6 mg/dL   Blood gas venous     Status: Abnormal   Result Value Ref Range    pH Venous 7.31 (L) 7.32 - 7.43    pCO2 Venous 74 (H) 40 - 50 mm Hg    pO2 Venous 26 25 - 47 mm Hg    Bicarbonate Venous 37 (H) 21 - 28 mmol/L    Base Excess/Deficit (+/-) 8.4 (H) -7.7 - 1.9 mmol/L    FIO2 2    Nt probnp inpatient (BNP)     Status: Abnormal   Result Value Ref Range    N terminal Pro BNP Inpatient 1,808 (H) 0 - 1,800 pg/mL   CBC with platelets and differential     Status: Abnormal   Result Value Ref Range    WBC Count 75.3 (HH) 4.0 - 11.0 10e3/uL    RBC Count 3.28 (L) 3.80 - 5.20 10e6/uL    Hemoglobin 9.7 (L) 11.7 - 15.7 g/dL    Hematocrit 33.0 (L) 35.0 - 47.0 %     (H) 78 - 100 fL    MCH 29.6 26.5 - 33.0 pg    MCHC 29.4 (L) 31.5 - 36.5 g/dL    RDW 16.0 (H) 10.0 - 15.0 %    Platelet Count 69 (L) 150 - 450 10e3/uL    Narrative    \"Differential done on albumin treated blood due to  smudge cells.\"    Previously reported component [ NRBCs ] is no longer reported.\"  Previously reported component [ NRBCs Absolute ] is no longer reported.\"   Lactic Acid STAT     Status: Abnormal   Result Value Ref Range    Lactic Acid 0.5 (L) 0.7 - 2.0 mmol/L   Manual Differential     Status: Abnormal   Result Value Ref Range    % Neutrophils 5 %    % Lymphocytes 91 %    % Monocytes 4 %    % Eosinophils 0 %    % Basophils 0 %    Absolute Neutrophils 3.8 1.6 - 8.3 10e3/uL    Absolute Lymphocytes 68.5 (H) 0.8 - 5.3 10e3/uL    Absolute Monocytes " 3.0 (H) 0.0 - 1.3 10e3/uL    Absolute Eosinophils 0.0 0.0 - 0.7 10e3/uL    Absolute Basophils 0.0 0.0 - 0.2 10e3/uL    RBC Morphology Confirmed RBC Indices     Platelet Assessment  Automated Count Confirmed. Platelet morphology is normal.     Automated Count Confirmed. Platelet morphology is normal.    Smudge Cells Present (A) None Seen   EKG 12-lead, tracing only     Status: None (Preliminary result)   Result Value Ref Range    Systolic Blood Pressure  mmHg    Diastolic Blood Pressure  mmHg    Ventricular Rate 64 BPM    Atrial Rate 64 BPM    MD Interval 220 ms    QRS Duration 90 ms     ms    QTc 441 ms    P Axis 49 degrees    R AXIS 75 degrees    T Axis 76 degrees    Interpretation ECG       Sinus rhythm with 1st degree A-V block with Premature atrial complexes  Otherwise normal ECG  When compared with ECG of 16-MAR-2022 12:24,  Premature atrial complexes are now Present     CBC with platelets differential     Status: Abnormal    Narrative    The following orders were created for panel order CBC with platelets differential.  Procedure                               Abnormality         Status                     ---------                               -----------         ------                     CBC with platelets and d...[[id number]]  Abnormal            Final result               Manual Differential[[id number]]          Abnormal            Final result                 Please view results for these tests on the individual orders.          Recent Results (from the past 48 hour(s))   XR Chest 2 Views    Narrative    EXAM: XR CHEST 2 VIEWS  LOCATION: [facility]  DATE/TIME: 08/06/2022, 12:37 PM    INDICATION: Cough. Shortness of breath.  COMPARISON: 03/16/2022.      Impression    IMPRESSION: No significant interval change. Tortuous calcified thoracic aorta. Mild hyperinflation both lungs. Mild scarring or linear atelectasis in the left midlung. Heart size is stable. Pulmonary vascularity is  within normal limits. No pleural   effusions. No pneumothorax.         EKG results: Sinus rhythm with 1st degree AV block with premature atrial complexes.        All imaging studies reviewed by me.       Patient`s old medical records reviewed and case discussed with the ED physician.    ED course-Reviewed  and care plan discussed with Britt Lino MD

## 2022-08-06 NOTE — ED PROVIDER NOTES
History   Chief Complaint:  Shortness of Breath      The history is provided by the patient.      Marie Kline is a 89 year old female with history of COPD, pneumonia, hypertension, NSTEMI, MI, and CHF who presents with shortness of breath. The patient is a poor historian. This morning she felt shortness of breath so she took her nebulizer with no relief. She has a productive cough. She is on Levaquin for pneumonia. Her daughter, Margaret states that her productive cough began 4 days ago. At the [facility], where Marie lives, she pressed the call light for her cough and shortness of breath. They performed a chest x-ray at the facility. She did not start Levaquin until 2 days ago due to dosage confusion. She took 750 mg of Levaquin 2 days ago and yesterday, and she says this morning. EMS report her oxygen was in the high 80's while on 2L of oxygen on the way to the ED. Denies chest pain, fever, or leg swelling.     Review of Systems   Constitutional: Negative for fever.   Respiratory: Positive for cough and shortness of breath.    Cardiovascular: Negative for chest pain and leg swelling.   All other systems reviewed and are negative.      Allergies:  Diagnostic X-Ray Materials  Contrast Dye  Prolia [Denosumab]  Wound Dressing Adhesive    Medications:  ASA  Levaquin  Proair  Lexapro  Lasix  Imodium  Toprol  Desyrel  Pulmicort  Synthroid     Past Medical History:     COVID-19  Pneumonia  Pulmonary nodules  Weakness of left leg  Hyponatremia  Hypokalemia  Hypertension  Insomnia  CHF  Hypogammaglobulinemia  CAD  Closed head injury  Pelvic hematoma  Closed nondisplaced fracture of pelvis  Mouth sores  Shoulder fracture  COPD  Stress-induced cardiomyopathy  NSTEMI  Femoral neck fracture  Hypoxia  Malignant neoplasm of urinary bladder  Xerosis cutis  Purpura senilis  CKD  Senile osteoporosis  Sepsis  Bladder cancer  Central spinal stenosis  Lumbar foraminal stenosis  Lumbar  "DDD  Splenomegaly  Hypothyroidism  LESLY  Hyperlipidemia  CLL  Acute MI  Nonischemic cardiomyopathy    Past Surgical History:    Biopsy lymph node inguinal right  Bladder surgery  Coronary angiography adult  CV left heart cath  Cystoscopy x3  EGD  Open reduction internal fixation hip bipolar left    Family History:    Mother: cerebrovascular disease  Father: cancer    Social History:  The patient presents to the ED alone by EMS  Living Situation: [facility]  Her daughter, Margaret is here now.    Physical Exam     Patient Vitals for the past 24 hrs:   BP Temp Temp src Pulse Resp SpO2 Height Weight   08/06/22 1330 136/57 -- -- 61 20 97 % -- --   08/06/22 1300 129/60 -- -- 55 20 100 % -- --   08/06/22 1230 116/81 -- -- 62 -- 97 % -- --   08/06/22 1200 125/66 -- -- 61 -- 100 % -- --   08/06/22 1159 -- -- -- 59 21 98 % -- --   08/06/22 1158 128/80 -- -- 67 -- 100 % -- --   08/06/22 1150 -- -- -- -- -- 100 % -- --   08/06/22 1147 -- -- -- -- -- 100 % -- --   08/06/22 1146 -- 98.8  F (37.1  C) Oral -- -- -- 1.499 m (4' 11\") 38.1 kg (84 lb)   08/06/22 1141 128/66 -- -- 59 24 100 % -- --       Physical Exam  Nursing note and vitals reviewed.  Constitutional:  Appears well-developed and well-nourished.   HENT:   Head:    Atraumatic.   Mouth/Throat:   Oropharynx is clear and moist. No oropharyngeal exudate.   Eyes:    Pupils are equal, round, and reactive to light.   Neck:    Normal range of motion. Neck supple.      No tracheal deviation present. No thyromegaly present.   Cardiovascular:  Normal rate, regular rhythm, no murmur   Pulmonary/Chest: Breath sounds are diminished bilaterally with few expiratory releases. Uses accessory muscles to breathe.   Abdominal:   Soft. Bowel sounds are normal. Exhibits no distension and      no mass. There is no tenderness.      There is no rebound and no guarding.   Musculoskeletal:  Exhibits no edema.   Lymphadenopathy:  No cervical adenopathy.   Neurological:   Alert and oriented to person, " place, and time.   Skin:    Skin is warm and dry. No rash noted. No pallor.     Emergency Department Course   ECG  ECG taken at 1227, ECG read at 1228  Sinus rhythm with 1st degree AV block with premature atrial complexes.   Rate 64 bpm. KS interval 220 ms. QRS duration 90 ms. QT/QTc 428/441 ms. P-R-T axes 49 75 76.     Imaging:  XR Chest 2 Views   Preliminary Result   IMPRESSION: No significant interval change. Tortuous calcified thoracic aorta. Mild hyperinflation both lungs. Mild scarring or linear atelectasis in the left midlung. Heart size is stable. Pulmonary vascularity is within normal limits. No pleural    effusions. No pneumothorax.           Report per radiology    Laboratory:  Labs Ordered and Resulted from Time of ED Arrival to Time of ED Departure   TROPONIN T, HIGH SENSITIVITY - Abnormal       Result Value    Troponin T, High Sensitivity 60 (*)    BASIC METABOLIC PANEL - Abnormal    Creatinine 0.96 (*)     Sodium 135 (*)     Potassium 4.7      Urea Nitrogen 9.3      Chloride 95 (*)     Carbon Dioxide (CO2) 33 (*)     Anion Gap 7      Glucose 88      GFR Estimate 56 (*)     Calcium 8.5     BLOOD GAS VENOUS - Abnormal    pH Venous 7.31 (*)     pCO2 Venous 74 (*)     pO2 Venous 26      Bicarbonate Venous 37 (*)     Base Excess/Deficit (+/-) 8.4 (*)     FIO2 2     NT PROBNP INPATIENT - Abnormal    N terminal Pro BNP Inpatient 1,808 (*)    CBC WITH PLATELETS AND DIFFERENTIAL - Abnormal    WBC Count 75.3 (*)     RBC Count 3.28 (*)     Hemoglobin 9.7 (*)     Hematocrit 33.0 (*)      (*)     MCH 29.6      MCHC 29.4 (*)     RDW 16.0 (*)     Platelet Count        NRBCs per 100 WBC 0      Absolute NRBCs 0.1     INFLUENZA A/B & SARS-COV2 PCR MULTIPLEX - Normal    Influenza A PCR Negative      Influenza B PCR Negative      RSV PCR Negative      SARS CoV2 PCR Negative          Emergency Department Course:         Reviewed:  I reviewed nursing notes, vitals, past medical history and Care  Everywhere    Assessments:  1152 I obtained history and examined the patient as noted above.   1208 I rechecked the patient and explained findings.   1220  I spoke with her daughter, Margaret  1326 I rechecked the patient's reaction to the medication. Itching.     Consults:  1430 I spoke with Dr. Vazquez, Hospitalist, regarding the patient.     Interventions:  1229 Solu-Medrol 125 mg IV  1258 Rocephin 2 g IV  1331 Benadryl 25 mg     Disposition:  The patient was admitted to the hospital under the care of Dr. Vazquez.     Impression & Plan     Medical Decision Making:  Found this patient to have a COPD exacerbation causing her to have acute on chronic respiratory failure with hypoxia and hypercapnia.  She has been treated for pneumonia with Levaquin there is currently no acute findings on her chest x-ray imaging.  She was initially given ceftriaxone here but she had itching all over which is concerning for an allergic reaction to the Ceftin    Diagnosis:    ICD-10-CM    1. Acute on chronic respiratory failure with hypoxia and hypercapnia (H)  J96.21     J96.22    2. COPD exacerbation (H)  J44.1        Scribe Disclosure:  Guerda HANKS, am serving as a scribe at 11:48 AM on 8/6/2022 to document services personally performed by Britt Lino MD based on my observations and the provider's statements to me.            Britt Lino MD  08/06/22 1172

## 2022-08-06 NOTE — ED NOTES
Up to BSC, satting 97% on 2L O2 upon getting back into bed. Reports shortness of breath, states she is usually short of breath on exertion at baseline. No increased respirations noted.

## 2022-08-06 NOTE — ED NOTES
.  [facility]  ED Nurse Handoff Report    Marie Kline is a 89 year old female   ED Chief complaint: Shortness of Breath  . ED Diagnosis:   Final diagnoses:   Acute on chronic respiratory failure with hypoxia and hypercapnia (H)   COPD exacerbation (H)     Allergies:   Allergies   Allergen Reactions     Diagnostic X-Ray Materials Hives     CT DYE     Contrast Dye Hives     CT DYE     Prolia [Denosumab]      Osteonecrosis jaw       Rocephin [Ceftriaxone] Itching     Wound Dressing Adhesive        Code Status: Full Code  Activity level - Baseline/Home:  Independent. Activity Level - Current:   Stand by Assist. Lift room needed: No. Bariatric: No   Needed: No   Isolation: No. Infection: Not Applicable.     Vital Signs:   Vitals:    08/06/22 1430 08/06/22 1500 08/06/22 1530 08/06/22 1545   BP: 132/64 138/65 115/65    Pulse: 57 56 65 61   Resp: 16 14 17 19   Temp:       TempSrc:       SpO2:   94% 98%   Weight:       Height:           Cardiac Rhythm:  ,      Pain level:    Patient confused: No. Patient Falls Risk: Yes.   Elimination Status: Has voided   Patient Report - Initial Complaint: is a 89 year old female with history of COPD, pneumonia, hypertension, NSTEMI, MI, and CHF who presents with shortness of breath. The patient is a poor historian. This morning she felt shortness of breath so she took her nebulizer with no relief. She has a productive cough. She is on Levaquin for pneumonia. Her daughter, Margaret states that her productive cough began 4 days ago. At the [facility], where Marie lives, she pressed the call light for her cough and shortness of breath. They performed a chest x-ray at the facility. She did not start Levaquin until 2 days ago due to dosage confusion. She took 750 mg of Levaquin 2 days ago and yesterday, and she says this morning. EMS report her oxygen was in the high 80's while on 2L of oxygen on the way to the ED. Denies chest pain, fever, or leg swelling. .   Focused  Assessment: Respiratory (Adult) - Airway WDL: WDL   Respiratory WDL - Respiratory WDL: .WDL except; all (is on 2L O2 at baseline at home) Rhythm/Pattern, Respiratory: shortness of breath; dyspnea on exertion (reports increased shortness of breath this AM. Arrived via EMS on 6L O2 as was satting 80's%. Satting 100%, have been decreasing O2 since arrival. Remains a/o x 4, satting %) Cough Frequency: frequent  Cough Type: good; productive   Neuro Cognitive (Adult) - Cognitive/Neuro/Behavioral WDL: WDL; all  Orientation: oriented x 4   Rivka Coma Scale - Best Eye Response: 4-->(E4) spontaneous  Best Motor Response: 6-->(M6) obeys commands  Best Verbal Response: 5-->(V5) oriented  Rivka Coma Scale Score: 15   Stroke Exam Continued - Orientation: oriented x 4      Tests Performed: EKG, labs, CXR, covid. Abnormal Results: .  Labs Ordered and Resulted from Time of ED Arrival to Time of ED Departure   TROPONIN T, HIGH SENSITIVITY - Abnormal       Result Value    Troponin T, High Sensitivity 60 (*)    BASIC METABOLIC PANEL - Abnormal    Creatinine 0.96 (*)     Sodium 135 (*)     Potassium 4.7      Urea Nitrogen 9.3      Chloride 95 (*)     Carbon Dioxide (CO2) 33 (*)     Anion Gap 7      Glucose 88      GFR Estimate 56 (*)     Calcium 8.5     BLOOD GAS VENOUS - Abnormal    pH Venous 7.31 (*)     pCO2 Venous 74 (*)     pO2 Venous 26      Bicarbonate Venous 37 (*)     Base Excess/Deficit (+/-) 8.4 (*)     FIO2 2     NT PROBNP INPATIENT - Abnormal    N terminal Pro BNP Inpatient 1,808 (*)    CBC WITH PLATELETS AND DIFFERENTIAL - Abnormal    WBC Count 75.3 (*)     RBC Count 3.28 (*)     Hemoglobin 9.7 (*)     Hematocrit 33.0 (*)      (*)     MCH 29.6      MCHC 29.4 (*)     RDW 16.0 (*)     Platelet Count        NRBCs per 100 WBC 0      Absolute NRBCs 0.1     INFLUENZA A/B & SARS-COV2 PCR MULTIPLEX - Normal    Influenza A PCR Negative      Influenza B PCR Negative      RSV PCR Negative      SARS CoV2 PCR Negative        .  XR Chest 2 Views   Final Result   IMPRESSION: No significant interval change. Tortuous calcified thoracic aorta. Mild hyperinflation both lungs. Mild scarring or linear atelectasis in the left midlung. Heart size is stable. Pulmonary vascularity is within normal limits. No pleural    effusions. No pneumothorax.            Treatments provided: see ED meds below  Comments: has tolerated up to BSC without difficulty. Does use walker at home.   OBS brochure/video discussed/provided to patient:  N/A  ED Medications:   Medications   methylPREDNISolone sodium succinate (solu-MEDROL) injection 125 mg (125 mg Intravenous Given 8/6/22 1229)   cefTRIAXone (ROCEPHIN) 2 g vial to attach to  ml bag for ADULTS or NS 50 ml bag for PEDS (0 g Intravenous Stopped 8/6/22 1330)   diphenhydrAMINE (BENADRYL) 50 MG/ML injection (25 mg  Given 8/6/22 1331)   aspirin (ASA) chewable tablet 324 mg (324 mg Oral Given 8/6/22 1525)   ipratropium - albuterol 0.5 mg/2.5 mg/3 mL (DUONEB) neb solution 3 mL (3 mLs Nebulization Given 8/6/22 1527)     Drips infusing:  No  For the majority of the shift, the patient's behavior Green. Interventions performed were NA.    Sepsis treatment initiated: No     Patient tested for COVID 19 prior to admission: YES, negative    ED Nurse Name/Phone Number: Destiney Silver RN,   3:55 PM    RECEIVING UNIT ED HANDOFF REVIEW    Above ED Nurse Handoff Report was reviewed: Yes  Reviewed by: Margarita Martinez RN on August 6, 2022 at 4:33 PM

## 2022-08-06 NOTE — ED NOTES
Back from CXR, appears comfortable.  Satting 99% on 2L, which is home baseline oxygen supp. Texting on smartphone as soon as back from imaging. Joking with staff.  No complaints a this time.

## 2022-08-06 NOTE — ED NOTES
"Patient placed call light on stating she is \"itchy\" all over.  Rocephin stopped and MD notified.  25mg benadryl given IV.  No hives or shortness of breath.  Patient calm at this time.   "

## 2022-08-06 NOTE — ED TRIAGE NOTES
Hx of COPD. Currently being treated PNA. More SOB today. Oxygen level in the high 80s on ems arrival. Chronic 2L O2 user. EMS put patient on 6L oxygen.

## 2022-08-06 NOTE — PLAN OF CARE
Goal Outcome Evaluation:    Pt resting comfortably, admitted to the floor. VSS on 3L O2. A&O, forgetful. Denies pain. Transfers with Ax1. Tolerated reg diet. On Levaquin.

## 2022-08-06 NOTE — PHARMACY-ADMISSION MEDICATION HISTORY
Admission medication history interview status for this patient is complete. See [facility] admission navigator for allergy information, prior to admission medications and immunization status.     Medication history interview done, indicate source(s): Patient and Family  Medication history resources (including written lists, pill bottles, clinic record):None  Pharmacy: [facility] in [place]    Changes made to PTA medication list:  Added: Levaquin  Changed: Furosemide from 40 mg on Mon, Fri and 20 mg rest of the week to 40 mg on Tues, Fri and 20 mg rest of the week  Reported as Not Taking: Metamucil  Removed: none    Actions taken by pharmacist (provider contacted, etc):None     Additional medication history information:Patient states that she is only using one medication by the nebulizer which is the revefenacin (Yupelri), however, the patient's family member states that the patient is also supposed to take budesonide two times daily.     Medication reconciliation/reorder completed by provider prior to medication history?  Y   (Y/N)     Prior to Admission medications    Medication Sig Last Dose Taking? Auth Provider Long Term End Date   albuterol (PROAIR HFA/PROVENTIL HFA/VENTOLIN HFA) 108 (90 Base) MCG/ACT inhaler Inhale 2 puffs into the lungs every 6 hours  Patient taking differently: Inhale 2 puffs into the lungs every 6 hours as needed 8/6/2022 at Unknown time Yes Marva Powell MD Yes    aspirin 81 MG EC tablet Take 81 mg by mouth every evening  8/5/2022 at pm Yes Unknown, Entered By History No    budesonide (PULMICORT) 0.5 MG/2ML neb solution Take 2 mLs (0.5 mg) by nebulization 2 times daily More than a month at Unknown time Yes Marva Powell MD Yes    escitalopram (LEXAPRO) 10 MG tablet Take 1 tablet (10 mg) by mouth daily 8/6/2022 at am Yes Be Dumont MD Yes    ferrous sulfate (IRON) 325 (65 FE) MG tablet Take 325 mg by mouth daily (with breakfast)  8/6/2022 at am Yes Reported, Patient      furosemide (LASIX) 20 MG tablet Take 20 mg by mouth Take one tablet on Sunday, Monday, Wednesday, Thursday and Saturday 8/6/2022 at am Yes Unknown, Entered By History Yes    furosemide (LASIX) 20 MG tablet Take 40 mg by mouth Take two tablets on Tuesday and Friday 8/5/2022 at am Yes Unknown, Entered By History Yes    Immune Globulin, Human, (OCTAGAM) 5 GM/100ML SOLN into the vein every 6 weeks.    Hold this medication while in TCU-resume at discharge from TCU  Yes Abstract, Provider     levofloxacin (LEVAQUIN) 250 MG tablet Take 750 mg by mouth daily Take for 5 days 8/6/2022 at am Yes Unknown, Entered By History     levothyroxine (SYNTHROID/LEVOTHROID) 88 MCG tablet Take 1 tablet (88 mcg) by mouth daily 8/6/2022 at am Yes Sis Brock PA-C Yes    loperamide (IMODIUM) 2 MG capsule Take 1 capsule (2 mg) by mouth 3 times daily as needed for diarrhea  at prn Yes Be Dumont MD     metoprolol succinate ER (TOPROL XL) 25 MG 24 hr tablet Take 1 tablet (25 mg) by mouth daily 8/6/2022 at am Yes Be Dumont MD Yes    Multiple Vitamins-Minerals (MULTIVITAMIN ADULT) CHEW Take 2 chew tab by mouth daily 8/6/2022 at am Yes Reported, Patient     Revefenacin 175 MCG/3ML SOLN Inhale 3 mLs (175 mcg) into the lungs daily 8/6/2022 at am Yes Marva Powell MD     traZODone (DESYREL) 50 MG tablet Take 1 tablet (50 mg) by mouth At Bedtime 8/5/2022 at pm Yes Be Dumont MD Yes    ACE/ARB/ARNI NOT PRESCRIBED (INTENTIONAL) Please choose reason not prescribed from choices below.   Nehal Freeman, CNP Yes    psyllium (METAMUCIL) 28.3 % POWD Take 1 teaspoon by mouth daily, mix with 8 oz water  Patient not taking: Reported on 8/6/2022 Not Taking at Unknown time  Be Dumont MD

## 2022-08-07 ENCOUNTER — APPOINTMENT (OUTPATIENT)
Dept: PHYSICAL THERAPY | Facility: CLINIC | Age: 87
DRG: 190 | End: 2022-08-07
Attending: INTERNAL MEDICINE
Payer: MEDICARE

## 2022-08-07 LAB
ANION GAP SERPL CALCULATED.3IONS-SCNC: 5 MMOL/L (ref 7–15)
BUN SERPL-MCNC: 18.2 MG/DL (ref 8–23)
CALCIUM SERPL-MCNC: 8.6 MG/DL (ref 8.2–9.6)
CHLORIDE SERPL-SCNC: 94 MMOL/L (ref 98–107)
CREAT SERPL-MCNC: 0.91 MG/DL (ref 0.51–0.95)
DEPRECATED HCO3 PLAS-SCNC: 32 MMOL/L (ref 22–29)
ERYTHROCYTE [DISTWIDTH] IN BLOOD BY AUTOMATED COUNT: 15.9 % (ref 10–15)
GFR SERPL CREATININE-BSD FRML MDRD: 60 ML/MIN/1.73M2
GLUCOSE SERPL-MCNC: 129 MG/DL (ref 70–99)
HCT VFR BLD AUTO: 31.2 % (ref 35–47)
HGB BLD-MCNC: 9.1 G/DL (ref 11.7–15.7)
MAGNESIUM SERPL-MCNC: 1.7 MG/DL (ref 1.7–2.3)
MCH RBC QN AUTO: 28.8 PG (ref 26.5–33)
MCHC RBC AUTO-ENTMCNC: 29.2 G/DL (ref 31.5–36.5)
MCV RBC AUTO: 99 FL (ref 78–100)
PLATELET # BLD AUTO: 66 10E3/UL (ref 150–450)
POTASSIUM SERPL-SCNC: 4.3 MMOL/L (ref 3.4–5.3)
RBC # BLD AUTO: 3.16 10E6/UL (ref 3.8–5.2)
SODIUM SERPL-SCNC: 131 MMOL/L (ref 136–145)
WBC # BLD AUTO: 102.4 10E3/UL (ref 4–11)

## 2022-08-07 PROCEDURE — 999N000157 HC STATISTIC RCP TIME EA 10 MIN

## 2022-08-07 PROCEDURE — 83735 ASSAY OF MAGNESIUM: CPT | Performed by: INTERNAL MEDICINE

## 2022-08-07 PROCEDURE — 36415 COLL VENOUS BLD VENIPUNCTURE: CPT | Performed by: INTERNAL MEDICINE

## 2022-08-07 PROCEDURE — 94640 AIRWAY INHALATION TREATMENT: CPT

## 2022-08-07 PROCEDURE — 250N000013 HC RX MED GY IP 250 OP 250 PS 637: Performed by: STUDENT IN AN ORGANIZED HEALTH CARE EDUCATION/TRAINING PROGRAM

## 2022-08-07 PROCEDURE — 94640 AIRWAY INHALATION TREATMENT: CPT | Mod: 76

## 2022-08-07 PROCEDURE — 250N000011 HC RX IP 250 OP 636: Performed by: INTERNAL MEDICINE

## 2022-08-07 PROCEDURE — 97161 PT EVAL LOW COMPLEX 20 MIN: CPT | Mod: GP

## 2022-08-07 PROCEDURE — 97530 THERAPEUTIC ACTIVITIES: CPT | Mod: GP

## 2022-08-07 PROCEDURE — 120N000001 HC R&B MED SURG/OB

## 2022-08-07 PROCEDURE — 250N000009 HC RX 250: Performed by: INTERNAL MEDICINE

## 2022-08-07 PROCEDURE — 99231 SBSQ HOSP IP/OBS SF/LOW 25: CPT | Performed by: STUDENT IN AN ORGANIZED HEALTH CARE EDUCATION/TRAINING PROGRAM

## 2022-08-07 PROCEDURE — 97116 GAIT TRAINING THERAPY: CPT | Mod: GP

## 2022-08-07 PROCEDURE — 80048 BASIC METABOLIC PNL TOTAL CA: CPT | Performed by: INTERNAL MEDICINE

## 2022-08-07 PROCEDURE — 250N000013 HC RX MED GY IP 250 OP 250 PS 637: Performed by: INTERNAL MEDICINE

## 2022-08-07 PROCEDURE — 85027 COMPLETE CBC AUTOMATED: CPT | Performed by: INTERNAL MEDICINE

## 2022-08-07 RX ORDER — GUAIFENESIN 600 MG/1
600 TABLET, EXTENDED RELEASE ORAL 2 TIMES DAILY
Status: DISCONTINUED | OUTPATIENT
Start: 2022-08-07 | End: 2022-08-11 | Stop reason: HOSPADM

## 2022-08-07 RX ORDER — ALBUTEROL SULFATE 0.83 MG/ML
2.5 SOLUTION RESPIRATORY (INHALATION)
Status: DISCONTINUED | OUTPATIENT
Start: 2022-08-07 | End: 2022-08-11 | Stop reason: HOSPADM

## 2022-08-07 RX ADMIN — METOPROLOL SUCCINATE 25 MG: 25 TABLET, FILM COATED, EXTENDED RELEASE ORAL at 08:49

## 2022-08-07 RX ADMIN — GUAIFENESIN 600 MG: 600 TABLET, EXTENDED RELEASE ORAL at 20:59

## 2022-08-07 RX ADMIN — METHYLPREDNISOLONE SODIUM SUCCINATE 40 MG: 40 INJECTION, POWDER, FOR SOLUTION INTRAMUSCULAR; INTRAVENOUS at 11:51

## 2022-08-07 RX ADMIN — FUROSEMIDE 20 MG: 20 TABLET ORAL at 08:50

## 2022-08-07 RX ADMIN — METHYLPREDNISOLONE SODIUM SUCCINATE 40 MG: 40 INJECTION, POWDER, FOR SOLUTION INTRAMUSCULAR; INTRAVENOUS at 21:01

## 2022-08-07 RX ADMIN — ACETAMINOPHEN 650 MG: 325 TABLET, FILM COATED ORAL at 08:47

## 2022-08-07 RX ADMIN — IPRATROPIUM BROMIDE AND ALBUTEROL SULFATE 3 ML: .5; 3 SOLUTION RESPIRATORY (INHALATION) at 09:03

## 2022-08-07 RX ADMIN — Medication 1 ML: at 15:33

## 2022-08-07 RX ADMIN — GUAIFENESIN 600 MG: 600 TABLET, EXTENDED RELEASE ORAL at 17:34

## 2022-08-07 RX ADMIN — TRAZODONE HYDROCHLORIDE 50 MG: 50 TABLET ORAL at 21:01

## 2022-08-07 RX ADMIN — ESCITALOPRAM 10 MG: 5 TABLET, FILM COATED ORAL at 08:51

## 2022-08-07 RX ADMIN — BUDESONIDE 0.5 MG: 0.5 INHALANT RESPIRATORY (INHALATION) at 07:42

## 2022-08-07 RX ADMIN — BUDESONIDE 0.5 MG: 0.5 INHALANT RESPIRATORY (INHALATION) at 20:47

## 2022-08-07 RX ADMIN — FERROUS SULFATE TAB 325 MG (65 MG ELEMENTAL FE) 325 MG: 325 (65 FE) TAB at 08:49

## 2022-08-07 RX ADMIN — LEVOTHYROXINE SODIUM 88 MCG: 0.09 TABLET ORAL at 08:48

## 2022-08-07 RX ADMIN — ASPIRIN 81 MG: 81 TABLET, COATED ORAL at 20:59

## 2022-08-07 ASSESSMENT — ACTIVITIES OF DAILY LIVING (ADL)
ADLS_ACUITY_SCORE: 32
ADLS_ACUITY_SCORE: 30
ADLS_ACUITY_SCORE: 36
ADLS_ACUITY_SCORE: 30
ADLS_ACUITY_SCORE: 32
ADLS_ACUITY_SCORE: 30
DEPENDENT_IADLS:: SHOPPING;TRANSPORTATION
ADLS_ACUITY_SCORE: 32

## 2022-08-07 NOTE — PROGRESS NOTES
[facility]    Medicine Progress Note - Hospitalist Service  Date of Admission: 8/6/2022     Assessment & Plan         Marie Kline is a 89 year old female with past medical history significant for C OPD on chronic oxygen, coronary artery disease, CHF, CLL, pulmonary nodules, hypertension, CKD, hypothyroidism, and hyperlipidemia who presented to [facility] on 8/6/2022 with productive cough and increased shortness of breath and was found to have COPD exacerbation with likely infective bronchitis.    Acute COPD Exacerbation  Productive Cough  Dyspnea  History of COPD chronically on 2 L of oxygen at home.  Now with reported history of increased shortness of breath and productive cough.  Was not requiring any increased oxygen requirements to maintain saturations, but does complain of significant dyspnea still.  Continue treatment for COPD exacerbation and respiratory fluoroquinolone as below.  -Budesonide neb 0.5 mg twice daily  -Albuterol neb every 2 hours as needed  -Methylprednisolone 40 mg IV every 12 hours (can de-escalate to prednisone daily tomorrow)  -Levofloxacin 250 mg every other day  -Mucinex 600mg BID    Elevated Troponin: Troponin 60. ECG without ST dynamic changes. No symptoms.  Malnutrition: BMI 16. Nutrition consult.  CLL: .4 (baseline to 140s). Follows with Dr. Ivory in [facility]. On IVIG infusions.  Mild Hyponatremia: Na 131, likely due to poor oral intake. Monitoring.  Chronic Anemia: Hgb at baseline ~9.1.   Chronic Thrombocytopenia: PLT 66; at baseline. No evidence of bleeding.  Hypothyroidism: Continue PTA levothyroxine.     Diet: Orders Placed This Encounter      Combination Diet Regular Diet Adult  DVT Prophylaxis: Pneumatic Compression Devices  Rees: None  Code Status: No CPR- Do NOT Intubate    Expected discharge: Likely 2-3 days pending clinical improvement.    The patient's care was discussed with the Bedside Nurse and Patient.    Cal Nuñez MD,  "[facility]  Hospitalist Service  [facility]  ______________________________________________________________________    Interval History   Nursing notes reviewed; no acute events overnight. Patient still feeling short of breath and with productive cough, but stated \"at least the mucus is no longer yellow.\"    A full 10+ point review of systems was performed and found to be negative with the exception of those items noted here.    Physical Exam   Temp: 98.7  F (37.1  C) Temp src: Oral BP: 129/56 Pulse: 71   Resp: 16 SpO2: 97 % O2 Device: Nasal cannula Oxygen Delivery: 2 LPM Height: 149.9 cm (4' 11\") Weight: 39.5 kg (87 lb)  Estimated body mass index is 17.57 kg/m  as calculated from the following:    Height as of this encounter: 1.499 m (4' 11\").    Weight as of this encounter: 39.5 kg (87 lb).    General: Very pleasant female resting comfortably in hospital bed.  Awake, alert, interactive.  HEENT: Normocephalic, atraumatic.  PERRL, EOMI.  Conjunctiva clear, sclerae anicteric.  Mucous membranes moist.  Cardiac: Regular rate and rhythm without murmur, gallop, or rub.  No peripheral edema.  Respiratory: Significant coughing with clear mucus production. Rales to right side, clear with coughing. Poor aeration to bases. No wheezing.  GI: Normal, active bowel sounds.  Abdomen soft, nontender, nondistended.  : Deferred.  Musculoskeletal: Moving all extremities appropriately.  Skin: No rashes or abrasions on exposed skin.  Neurologic: Alert and oriented x4.  Cranial nerves II through XII grossly intact.  Psychologic: Appropriate mood and affect.    Data   All laboratory results and other diagnostic data from the past 24 hours is available in Epic and has been personally reviewed.    Recent Labs   Lab 08/07/22  0618 08/06/22  1742 08/06/22  1221   .4*  --  75.3*   HGB 9.1*  --  9.7*   MCV 99  --  101*   PLT 66*  --  69*   *  --  135*   POTASSIUM 4.3  --  4.7   CHLORIDE 94*  --  95*   CO2 32*  --  " 33*   BUN 18.2  --  9.3   CR 0.91 0.95 0.96*   ANIONGAP 5*  --  7   CARLOS 8.6  --  8.5   *  --  88     No results found for this or any previous visit (from the past 24 hour(s)).  I personally reviewed: no images or EKG's today.

## 2022-08-07 NOTE — PROGRESS NOTES
08/07/22 1400   Quick Adds   Type of Visit Initial PT Evaluation   Living Environment   People in Home alone   Current Living Arrangements assisted living   Home Accessibility no concerns   Transportation Anticipated family or friend will provide   Living Environment Comments Pt lives in half-way; walk-in shower.   Self-Care   Usual Activity Tolerance moderate   Current Activity Tolerance fair   Regular Exercise No   Equipment Currently Used at Home walker, rolling;shower chair;grab bar, toilet;grab bar, tub/shower   Fall history within last six months no   Activity/Exercise/Self-Care Comment Pt reports mod I at baseline with 4WW; on 2L O2, does some cooking, IND with self cares. Receives assist with bathing 1x/week, 1 meal provided/day.   General Information   Onset of Illness/Injury or Date of Surgery 08/06/22   Referring Physician Ramón Vazquez MD   Patient/Family Therapy Goals Statement (PT) did not state a goal   Pertinent History of Current Problem (include personal factors and/or comorbidities that impact the POC) Marie Kline is a 89 year old  female with a significant past medical history of COPD on home oxygen, coronary artery disease and CHF who presents with productive cough and increased shortness of breath.She lives at the [facility].   Existing Precautions/Restrictions fall;oxygen therapy device and L/min   Cognition   Affect/Mental Status (Cognition) WNL   Orientation Status (Cognition) oriented x 3   Follows Commands (Cognition) WNL   Pain Assessment   Patient Currently in Pain No   Integumentary/Edema   Integumentary/Edema Comments various bruising and erythema   Posture    Posture Forward head position;Protracted shoulders   Range of Motion (ROM)   ROM Comment BLEs WFL   Strength (Manual Muscle Testing)   Strength Comments at least 3/5 with functional mobility, able to SLR bilaterally   Bed Mobility   Comment, (Bed Mobility) supine<>sit with SBA   Transfers   Comment, (Transfers)  sit<>stand with CGA and FWW   Gait/Stairs (Locomotion)   Comment, (Gait/Stairs) amb with FWW and CGA-min A   Balance   Balance Comments mild impairment; requiring use of AD; 4WW at baseline   Clinical Impression   Criteria for Skilled Therapeutic Intervention Yes, treatment indicated   PT Diagnosis (PT) impaired functional mobility   Influenced by the following impairments weakness, deconditioning   Functional limitations due to impairments difficulty with transfers, ambulation   Clinical Presentation (PT Evaluation Complexity) Stable/Uncomplicated   Clinical Presentation Rationale clinical judgement, [facility]   Clinical Decision Making (Complexity) low complexity   Planned Therapy Interventions (PT) balance training;bed mobility training;gait training;neuromuscular re-education;patient/family education;strengthening;transfer training;progressive activity/exercise;risk factor education;home program guidelines   Anticipated Equipment Needs at Discharge (PT) walker, standard   Risk & Benefits of therapy have been explained evaluation/treatment results reviewed;care plan/treatment goals reviewed;risks/benefits reviewed;current/potential barriers reviewed;participants voiced agreement with care plan;participants included;patient   PT Discharge Planning   PT Discharge Recommendation (DC Rec) home with assist;home with home care physical therapy;Transitional Care Facility   PT Rationale for DC Rec Patient is currently moderately below baseline functional mobility. Patient lives alone in custodial; she is independent with AD and most self cares at baseline. Currently patient is limited with mobility due to weakness, fatigue, and shortness of breath. Requiring assist of 1 for functional mobility. Anticipate with medical improvement and continued skilled PT services pt may progress to be mostly independent with mobility and safe for discharge home. If pt still requiring assist of 1, recommend TCU for further progression.   Total  Evaluation Time   Total Evaluation Time (Minutes) 8   Physical Therapy Goals   PT Frequency Daily   PT Predicted Duration/Target Date for Goal Attainment 08/10/22   PT Goals Bed Mobility;Transfers;Gait   PT: Bed Mobility Modified independent;Supine to/from sit   PT: Transfers Modified independent;Sit to/from stand;Assistive device   PT: Gait Modified independent;Assistive device;Greater than 200 feet  (with SpO2 >90%)

## 2022-08-07 NOTE — CONSULTS
Care Management Initial Consult    General Information  Assessment completed with: Patient,    Type of CM/SW Visit: Initial Assessment    Primary Care Provider verified and updated as needed: Yes   Readmission within the last 30 days: no previous admission in last 30 days      Reason for Consult: care coordination/care conference, discharge planning  Advance Care Planning:            Communication Assessment  Patient's communication style: spoken language (English or Bilingual)    Hearing Difficulty or Deaf: yes   Wear Glasses or Blind: yes    Cognitive  Cognitive/Neuro/Behavioral: WDL, all  Level of Consciousness: alert  Arousal Level: opens eyes spontaneously  Orientation: oriented x 4  Mood/Behavior: calm, cooperative  Best Language: 0 - No aphasia  Speech: clear, logical    Living Environment:   People in home: facility resident     Current living Arrangements: assisted living      Able to return to prior arrangements: yes       Family/Social Support:  Care provided by: self  Provides care for:    Marital Status:   Children          Description of Support System: Supportive, Involved         Current Resources:   Patient receiving home care services: No     Community Resources: None  Equipment currently used at home: walker, rolling  Supplies currently used at home:      Employment/Financial:  Employment Status: retired        Financial Concerns:             Lifestyle & Psychosocial Needs:  Social Determinants of Health     Tobacco Use: Medium Risk     Smoking Tobacco Use: Former Smoker     Smokeless Tobacco Use: Never Used   Alcohol Use: Not on file   Financial Resource Strain: Low Risk      Difficulty of Paying Living Expenses: Not hard at all   Food Insecurity: No Food Insecurity     Worried About Running Out of Food in the Last Year: Never true     Ran Out of Food in the Last Year: Never true   Transportation Needs: No Transportation Needs     Lack of Transportation (Medical): No     Lack of  Transportation (Non-Medical): No   Physical Activity: Not on file   Stress: Not on file   Social Connections: Not on file   Intimate Partner Violence: Not At Risk     Fear of Current or Ex-Partner: No     Emotionally Abused: No     Physically Abused: No     Sexually Abused: No   Depression: Not at risk     PHQ-2 Score: 0   Housing Stability: Not on file       Functional Status:  Prior to admission patient needed assistance:   Dependent ADLs:: Independent  Dependent IADLs:: Shopping, Transportation       Mental Health Status:  Mental Health Status: No Current Concerns       Chemical Dependency Status:  Chemical Dependency Status: No Current Concerns             Values/Beliefs:  Spiritual, Cultural Beliefs, Yazidism Practices, Values that affect care: yes               Additional Information:  Pt admitted with COPD exacerbation.  Pt identified as high risk for readmission.  CM met with pt at bedside to discuss discharge planning. Pt reports that she live at [facility] and they help her with bathing and one meal a day.  She uses 2 liter of oxygen (but did not remember O2 supplier) she uses her nebulizer one a day.  Pt did give permission to speak with [facility].  [facility] for [facility] nurse at [place] [phone]    Pt became overwhelmed and asked that I call and speak with her dtg Margaret.  CM spoke with Margaret who confirmed pt lives at the [facility], pt set up her own medication, receives one meal a day and help with bathing weekly.  Margaret said pt had just graduated from [facility] PT/OT.  If pt needs home care family would like to use [facility] again. Margaret would appreciate hospital follow-up appointment support. [facility] referral sent.  Margaret anticipates providing transportation at discharge.    Awaiting PT recommendations.    Anna Sierra RN, BSN, PHN, CCM  Care Coordinator  [facility]  [phone]

## 2022-08-07 NOTE — PLAN OF CARE
"Goal Outcome Evaluation:    Vitals: /41 (BP Location: Right arm)   Pulse 69   Temp 97.4  F (36.3  C) (Oral)   Resp 20   Ht 1.499 m (4' 11\")   Wt 39.5 kg (87 lb)   LMP  (LMP Unknown)   SpO2 95%   BMI 17.57 kg/m      Primary DX: SOB  Orientation: A&OX4. Speech clear. Uses call light appropriately.   Pain: Denies any pain  Neuro: intact  Respiratory: LS exp crackles. BENJAMIN. Infrequent good productive cough.   Cardiac/Tele: Tele: SA w/ 1*AVB.  Bowel Sounds: +X4Q. ABD soft, non-tender.   GI/: Continent urine, no bm this shift.   Skin: Luis legs. Bruise L great toe. R shin scab.   LDA: L PIV SL  Protocols: K and Mag   Diet: Regular diet.   Activity: AX1 w/ GB and walker.   Plan: Continue current POC.                         "

## 2022-08-07 NOTE — PROGRESS NOTES
"SPIRITUAL HEALTH SERVICES   [facility] MS3     Referral Source: Routine admission hospital  request.    Summary: Pt identifies as Calixto. Pt is known to this writer from previous admissions.  Provided reflective conversation with Marie which integrated elements of illness and spiritual experience.  No particular distress expressed.  Welcomed a time of prayer.    Coping:         at [facility].  She speaks with him regularly and he has visited her at the hospital.    Weekly attendance at chapel services.    Daily large print reader.  We looked at today's scripture together. Marie is faithful to her devotional time.    Family.  Daughter, Margaret and KEO arrived at close of visit.    Meaning Making:   Marie feels she \"must have more to do\". Caty and prayer have been integral to her life perspective.  Marie 'talks to God'often. Marie shared, \"I tease the Lord.  I say 'you made me, you can fix me'\". In addition to her close relationship and frequent conversation with God, Marie noted her good attitude and humor as contributing to her long life.     Plan:  . Spiritual Health remains available throughout hospital stay.     Jean Murphy MA  Staff   [phone] pager   *[phone]       "

## 2022-08-07 NOTE — PLAN OF CARE
"Goal Outcome Evaluation:    Plan of Care Reviewed With: patient     Overall Patient Progress: improving       VSS. Pt on 2L O2 (baseline). C/o HA this AM, relieved with tylenol. Reports SOB, but \"always short of breath\". Cough is congested but productive. Prn neb given this shift. Tele SR/SA. No CP reported. Voids w/o difficulty. Up A1 GB and walker. C/o skin itching today, calamine lotion applied with relief. BLE carleen, skin w/ scattered bruising. Discharge 2-3 days back to snf.     "

## 2022-08-08 ENCOUNTER — APPOINTMENT (OUTPATIENT)
Dept: PHYSICAL THERAPY | Facility: CLINIC | Age: 87
DRG: 190 | End: 2022-08-08
Payer: MEDICARE

## 2022-08-08 LAB
ATRIAL RATE - MUSE: 64 BPM
DIASTOLIC BLOOD PRESSURE - MUSE: NORMAL MMHG
INTERPRETATION ECG - MUSE: NORMAL
MAGNESIUM SERPL-MCNC: 1.7 MG/DL (ref 1.7–2.3)
P AXIS - MUSE: 49 DEGREES
POTASSIUM SERPL-SCNC: 4.8 MMOL/L (ref 3.4–5.3)
PR INTERVAL - MUSE: 220 MS
QRS DURATION - MUSE: 90 MS
QT - MUSE: 428 MS
QTC - MUSE: 441 MS
R AXIS - MUSE: 75 DEGREES
SYSTOLIC BLOOD PRESSURE - MUSE: NORMAL MMHG
T AXIS - MUSE: 76 DEGREES
VENTRICULAR RATE- MUSE: 64 BPM

## 2022-08-08 PROCEDURE — 999N000157 HC STATISTIC RCP TIME EA 10 MIN

## 2022-08-08 PROCEDURE — 250N000012 HC RX MED GY IP 250 OP 636 PS 637: Performed by: INTERNAL MEDICINE

## 2022-08-08 PROCEDURE — 250N000009 HC RX 250: Performed by: INTERNAL MEDICINE

## 2022-08-08 PROCEDURE — 97530 THERAPEUTIC ACTIVITIES: CPT | Mod: GP | Performed by: PHYSICAL THERAPIST

## 2022-08-08 PROCEDURE — 82565 ASSAY OF CREATININE: CPT | Performed by: INTERNAL MEDICINE

## 2022-08-08 PROCEDURE — 83735 ASSAY OF MAGNESIUM: CPT | Performed by: STUDENT IN AN ORGANIZED HEALTH CARE EDUCATION/TRAINING PROGRAM

## 2022-08-08 PROCEDURE — 36415 COLL VENOUS BLD VENIPUNCTURE: CPT | Performed by: STUDENT IN AN ORGANIZED HEALTH CARE EDUCATION/TRAINING PROGRAM

## 2022-08-08 PROCEDURE — 94640 AIRWAY INHALATION TREATMENT: CPT | Mod: 76

## 2022-08-08 PROCEDURE — 120N000001 HC R&B MED SURG/OB

## 2022-08-08 PROCEDURE — 84132 ASSAY OF SERUM POTASSIUM: CPT | Performed by: STUDENT IN AN ORGANIZED HEALTH CARE EDUCATION/TRAINING PROGRAM

## 2022-08-08 PROCEDURE — 99233 SBSQ HOSP IP/OBS HIGH 50: CPT | Performed by: INTERNAL MEDICINE

## 2022-08-08 PROCEDURE — 250N000013 HC RX MED GY IP 250 OP 250 PS 637: Performed by: INTERNAL MEDICINE

## 2022-08-08 PROCEDURE — 250N000013 HC RX MED GY IP 250 OP 250 PS 637: Performed by: STUDENT IN AN ORGANIZED HEALTH CARE EDUCATION/TRAINING PROGRAM

## 2022-08-08 PROCEDURE — 94640 AIRWAY INHALATION TREATMENT: CPT

## 2022-08-08 PROCEDURE — 97116 GAIT TRAINING THERAPY: CPT | Mod: GP | Performed by: PHYSICAL THERAPIST

## 2022-08-08 RX ORDER — PREDNISONE 20 MG/1
40 TABLET ORAL DAILY
Status: DISCONTINUED | OUTPATIENT
Start: 2022-08-08 | End: 2022-08-11 | Stop reason: HOSPADM

## 2022-08-08 RX ADMIN — TRAZODONE HYDROCHLORIDE 50 MG: 50 TABLET ORAL at 21:50

## 2022-08-08 RX ADMIN — GUAIFENESIN 600 MG: 600 TABLET, EXTENDED RELEASE ORAL at 08:54

## 2022-08-08 RX ADMIN — GUAIFENESIN 600 MG: 600 TABLET, EXTENDED RELEASE ORAL at 20:44

## 2022-08-08 RX ADMIN — ASPIRIN 81 MG: 81 TABLET, COATED ORAL at 20:44

## 2022-08-08 RX ADMIN — LEVOFLOXACIN 250 MG: 250 TABLET, FILM COATED ORAL at 08:54

## 2022-08-08 RX ADMIN — BUDESONIDE 0.5 MG: 0.5 INHALANT RESPIRATORY (INHALATION) at 08:35

## 2022-08-08 RX ADMIN — PREDNISONE 40 MG: 20 TABLET ORAL at 08:54

## 2022-08-08 RX ADMIN — BUDESONIDE 0.5 MG: 0.5 INHALANT RESPIRATORY (INHALATION) at 19:57

## 2022-08-08 RX ADMIN — LEVOTHYROXINE SODIUM 88 MCG: 0.09 TABLET ORAL at 08:54

## 2022-08-08 RX ADMIN — FERROUS SULFATE TAB 325 MG (65 MG ELEMENTAL FE) 325 MG: 325 (65 FE) TAB at 08:54

## 2022-08-08 RX ADMIN — ESCITALOPRAM 10 MG: 5 TABLET, FILM COATED ORAL at 08:54

## 2022-08-08 RX ADMIN — FUROSEMIDE 20 MG: 20 TABLET ORAL at 08:53

## 2022-08-08 ASSESSMENT — ACTIVITIES OF DAILY LIVING (ADL)
ADLS_ACUITY_SCORE: 36

## 2022-08-08 NOTE — PROGRESS NOTES
[facility]    Medicine Progress Note - Hospitalist Service  Date of Admission: 8/6/2022     Reason for stay    Acute COPD exacerbation    Acute bacterial bronchitis    Assessment & Plan         Marie Kline is a 89 year old female with past medical history significant for C OPD on chronic oxygen, coronary artery disease, CHF, CLL, pulmonary nodules, hypertension, CKD, hypothyroidism, and hyperlipidemia who presented to [facility] on 8/6/2022 with productive cough and increased shortness of breath and was found to have COPD exacerbation with likely infective bronchitis.    Acute COPD Exacerbation  Productive Cough  Dyspnea  History of COPD chronically on 2 L of oxygen at home.  Now with reported history of increased shortness of breath and productive cough.  Was not requiring any increased oxygen requirements to maintain saturations, but does complain of significant dyspnea still.  Continue treatment for COPD exacerbation and respiratory fluoroquinolone as below.  -Budesonide neb 0.5 mg twice daily  -Albuterol neb every 2 hours as needed  -Has been on methylprednisolone 40 mg IV every 12 hours we will change to oral prednisone 40 mg once a day   -Levofloxacin 250 mg every other day  -Mucinex 600mg BID    Elevated Troponin: Troponin 60. ECG without ST dynamic changes. No symptoms.  Malnutrition: BMI 16. Nutrition consult.  CLL: .4 (baseline to 140s). Follows with Dr. Ivory in [facility]. On IVIG infusions.  Mild Hyponatremia: Na 131, likely due to poor oral intake. Monitoring.  Chronic Anemia: Hgb at baseline ~9.1.   Chronic Thrombocytopenia: PLT 66; at baseline. No evidence of bleeding.  Hypothyroidism: Continue PTA levothyroxine.  Malnutrition: Dietitian consulted for further assessment and recommendations.  Physical deconditioning: PT OT and discharge planning     Diet: Orders Placed This Encounter      Combination Diet Regular Diet Adult  DVT Prophylaxis: Pneumatic  "Compression Devices  Rees: None  Code Status: No CPR- Do NOT Intubate    Expected discharge: May discharge in the next 1 day if she continues to do well  ______________________________________________________________________    Interval History     Patient was seen and examined by me at bedside.  She is known to me from admission.  She denies any significant shortness of breath but she continues to have shortness of breath with movement.  She lives in assisted living at [facility] years.  She is not eating very well.  She has been having diarrhea chronically per her daughter Margaret.    A full 10+ point review of systems was performed and found to be negative with the exception of those items noted here.    Physical Exam   Temp: 98.2  F (36.8  C) Temp src: Oral BP: 136/58 Pulse: 77   Resp: 18 SpO2: 92 % O2 Device: Nasal cannula Oxygen Delivery: 3 LPM Height: 149.9 cm (4' 11\") Weight: 40.2 kg (88 lb 9.6 oz)  Estimated body mass index is 17.9 kg/m  as calculated from the following:    Height as of this encounter: 1.499 m (4' 11\").    Weight as of this encounter: 40.2 kg (88 lb 9.6 oz).    General: Very pleasant female resting comfortably in hospital bed.  Awake, alert, interactive.  HEENT: Normocephalic, atraumatic.  PERRL, EOMI.  Conjunctiva clear, sclerae anicteric.  Mucous membranes moist.  Cardiac: Regular rate and rhythm without murmur, gallop, or rub.  No peripheral edema.  Respiratory: Significant coughing with clear mucus production. Rales to right side, clear with coughing. Poor aeration to bases. No wheezing.  GI: Normal, active bowel sounds.  Abdomen soft, nontender, nondistended.  : Deferred.  Musculoskeletal: Moving all extremities appropriately.  Skin: No rashes or abrasions on exposed skin.  Neurologic: Alert and oriented x4.  Cranial nerves II through XII grossly intact.  Psychologic: Appropriate mood and affect.    Data   All laboratory results and other diagnostic data from the past 24 hours is available in " Epic and has been personally reviewed.    Recent Labs   Lab 08/08/22  0810 08/07/22  0618 08/06/22  1742 08/06/22  1221   WBC  --  102.4*  --  75.3*   HGB  --  9.1*  --  9.7*   MCV  --  99  --  101*   PLT  --  66*  --  69*   NA  --  131*  --  135*   POTASSIUM 4.8 4.3  --  4.7   CHLORIDE  --  94*  --  95*   CO2  --  32*  --  33*   BUN  --  18.2  --  9.3   CR  --  0.91 0.95 0.96*   ANIONGAP  --  5*  --  7   CARLOS  --  8.6  --  8.5   GLC  --  129*  --  88     No results found for this or any previous visit (from the past 24 hour(s)).  I personally reviewed: no images or EKG's today.

## 2022-08-08 NOTE — PLAN OF CARE
Goal Outcome Evaluation:    Plan of Care Reviewed With: patient      Pt reports a normal appetite and PO intake PTA. Typically consumes meals TID. Discussed the option of a high calorie and high protein diet. Provided handouts. Ordered ensure enlive between meals.     Rossana Jenkins RD, LD  Clinical Dietitian     3rd floor/ICU: [phone]  All other floors: [phone]  Weekend/holiday: [phone]  Office: [phone]

## 2022-08-08 NOTE — PLAN OF CARE
"Goal Outcome Evaluation:    Vitals: /54 (BP Location: Right arm, Patient Position: Supine)   Pulse 60   Temp 98.1  F (36.7  C) (Oral)   Resp 18   Ht 1.499 m (4' 11\")   Wt 39.5 kg (87 lb)   LMP  (LMP Unknown)   SpO2 97%   BMI 17.57 kg/m      Primary DX: COPD Exacerbation  Orientation: A&OX4 but is forgetful and repeats herself throughout the shift. Pleasant. Speech clear. Uses call light appropriately.   Pertinent: Successfully weaned to 2LPM/NC which is her baseline at the [facility].   Pain: Denies  Neuro: intact  Respiratory: LS diminished.   Cardiac/Tele: SA w/ 1*AVB.   Bowel Sounds: +X4Q. ABD soft, non-tender.   GI/: Continent urine, no bm this shift.   Skin: Luis legs. Bruise L great toe. Scab R shin.  LDA: L PIV SL  Protocols: K and Mag  Diet: Regular  Activity: AX1 w/ GB and walker.   Followed By/Consults: PT/   Plan: Continue current POC.                      "

## 2022-08-08 NOTE — PROVIDER NOTIFICATION
"Paged dr overton: per dr gonsalez' note from yesterday \"-Methylprednisolone 40 mg IV every 12 hours (can de-escalate to prednisone daily tomorrow)\" if you would like to transition to po this am please enter order, thank you!      8:28 AM  Order received.  "

## 2022-08-08 NOTE — CONSULTS
CLINICAL NUTRITION SERVICES  -  ASSESSMENT NOTE    Recommendations Ordered by Registered Dietitian (RD):   Continue diet as ordered --> encouraged high calorie and high protein diet  Ordered Ensure Enlive BID between meals (chocolate and vanilla)   Ordered MVI+M --> unable to order, allergy interaction    Malnutrition:   % Weight Loss:  Weight loss does not meet criteria for malnutrition - chronically underweight   % Intake:  No decreased intake noted  Subcutaneous Fat Loss:  Orbital region severe depletion and Upper arm region severe depletion  Muscle Loss:  Temporal region moderate-severe depletion, Clavicle bone region severe depletion, Acromion bone region severe depletion, Scapular bone region severe depletion and Posterior calf region moderate depletion  Fluid Retention:  None noted    Malnutrition Diagnosis: Severe malnutrition  In Context of:  Chronic illness or disease      REASON FOR ASSESSMENT  Marie Kline is a 89 year old female seen by Registered Dietitian for Provider Order - patient/family request     Past medical history: COPD on home oxygen, coronary artery disease and CHF     Admitted for: acute COPD exacerbation     NUTRITION HISTORY  - Information obtained from patient and chart   - Typical diet at home: regular diet   - Typical food/fluid intake PTA: pt reports a normal PO intake PTA, typically consumes 3 meals per day.   - Breakfast: coffee and toast   - Lunch: soup  - Dinner: tv dinner vs soup   - Supplements: boost plus x 2 per day   - Cooks/prepares meals: patient   - Chewing/swallowing difficulty: pt notes that she has to modify most foods given difficulty with chewing.   - Food allergies: NKFA    CURRENT NUTRITION ORDERS  Diet Order:     Regular Diet     Current Intake/Tolerance:  Upon review of the flowsheets, pt is consuming 50% of meals ordered. Limited timeframe to comment on     Obtained from Chart/Interdisciplinary Team:  - Currently on 2LPM NC   - Reviewed stooling patterns  - No  "documented PI    ANTHROPOMETRICS  Height: 4' 11\"  Weight: 40.2 kg ( 88 lbs 9.6 oz)   Body mass index is 17.9 kg/m .  Weight Status:  Underweight BMI <18.5  Weight History: weight fairly stable. Pt reports a usual body weight of 84-86 lbs. No recent weight loss. Chronically underweight.   Wt Readings from Last 10 Encounters:   08/08/22 40.2 kg (88 lb 9.6 oz)   05/16/22 39 kg (86 lb)   04/04/22 40.9 kg (90 lb 1.6 oz)   03/22/22 39.9 kg (88 lb)   03/16/22 40.4 kg (89 lb)   02/17/22 40.4 kg (89 lb)   10/25/21 39.9 kg (88 lb)   10/06/21 40.1 kg (88 lb 6.4 oz)   09/20/21 38.5 kg (84 lb 14.4 oz)   07/21/21 41.4 kg (91 lb 3.2 oz)       LABS  Labs reviewed    Labs:  Electrolytes  Potassium (mmol/L)   Date Value   08/07/2022 4.3   08/06/2022 4.7   05/16/2022 4.3   03/16/2022 4.6   09/23/2021 3.5   07/07/2021 3.9   07/06/2021 3.9   07/06/2021 3.0 (L)     Phosphorus (mg/dL)   Date Value   05/01/2019 3.8   03/12/2018 3.9   05/12/2017 3.3   03/27/2017 2.9   03/26/2017 2.3 (L)    Blood Glucose  Glucose (mg/dL)   Date Value   08/07/2022 129 (H)   08/06/2022 88   05/16/2022 98   03/16/2022 106 (H)   09/23/2021 89   09/22/2021 184 (H)   09/21/2021 136 (H)   07/07/2021 82   07/06/2021 108 (H)   07/05/2021 98   07/04/2021 117 (H)   03/29/2021 94     Hemoglobin A1C (%)   Date Value   06/17/2016 5.6   06/16/2016 5.6   10/09/2014 5.6   07/18/2014 5.7    Inflammatory Markers  WBC (10e9/L)   Date Value   07/07/2021 48.8 (H)   07/06/2021 54.6 (HH)   07/05/2021 97.4 (HH)     WBC Count (10e3/uL)   Date Value   08/07/2022 102.4 (HH)   08/06/2022 75.3 (HH)   05/16/2022 99.6 (HH)     Albumin (g/dL)   Date Value   05/16/2022 3.9   09/21/2021 2.7 (L)   09/20/2021 2.7 (L)   03/27/2021 3.2 (L)   06/30/2020 3.7   07/21/2019 1.7 (L)      Magnesium (mg/dL)   Date Value   08/07/2022 1.7   08/06/2022 1.7   05/01/2019 2.0   12/08/2018 2.4 (H)   03/12/2018 2.1     Sodium (mmol/L)   Date Value   08/07/2022 131 (L)   08/06/2022 135 (L)   05/16/2022 133 "   07/07/2021 138   07/06/2021 134   07/05/2021 132 (L)    Renal  Urea Nitrogen (mg/dL)   Date Value   08/07/2022 18.2   08/06/2022 9.3   05/16/2022 15   03/16/2022 13   09/23/2021 16   07/07/2021 22   07/06/2021 15   07/05/2021 18     Creatinine (mg/dL)   Date Value   08/07/2022 0.91   08/06/2022 0.95   08/06/2022 0.96 (H)   07/07/2021 0.78   07/06/2021 0.83   07/05/2021 0.89     Additional  Triglycerides (mg/dL)   Date Value   01/06/2021 102   05/01/2019 160 (H)   03/12/2018 98     Ketones Urine (mg/dL)   Date Value   04/15/2021 Negative        MEDICATIONS  Medications reviewed      aspirin  81 mg Oral QPM     budesonide  0.5 mg Nebulization BID     escitalopram  10 mg Oral Daily     ferrous sulfate  325 mg Oral Daily with breakfast     furosemide  20 mg Oral Once per day on Sun Mon Wed Thu Sat     [START ON 8/9/2022] furosemide  40 mg Oral Once per day on Tue Fri     guaiFENesin  600 mg Oral BID     levofloxacin  250 mg Oral Every Other Day     levothyroxine  88 mcg Oral Daily     metoprolol succinate ER  25 mg Oral Daily     predniSONE  40 mg Oral Daily     Revefenacin  175 mcg Inhalation Daily     sodium chloride (PF)  3 mL Intracatheter Q8H     traZODone  50 mg Oral At Bedtime         acetaminophen **OR** acetaminophen, albuterol, ipratropium - albuterol 0.5 mg/2.5 mg/3 mL, lidocaine 4%, lidocaine (buffered or not buffered), loperamide, melatonin, ondansetron **OR** ondansetron, pramoxine-calamine, prochlorperazine **OR** prochlorperazine **OR** prochlorperazine, sodium chloride (PF)     ASSESSED NUTRITION NEEDS PER APPROVED PRACTICE GUIDELINES:    Dosing Weight 40.2 kg   Estimated Energy Needs: 0973-3048+ kcals (30-35+ Kcal/Kg)  Justification: repletion and underweight  Estimated Protein Needs: 48-60+ grams protein (1.2-1.5+ g pro/Kg)  Justification: Repletion and preservation of lean body mass  Estimated Fluid Needs: per MD     NUTRITION DIAGNOSIS:  Increased nutrient needs (protein and energy) related to  repletion/preservation of lean body mass as evidenced by pt requiring a minimum of 30 kcal/kg and 1.2 g protein/kg     NUTRITION INTERVENTIONS  Recommendations / Nutrition Prescription  Continue diet as ordered --> encouraged high calorie and high protein diet  Ordered Ensure Enlive BID between meals (chocolate and vanilla)   Ordered MVI+M     Implementation  Nutrition education: Provided education on high calorie and high protein diet, handouts provided   Medical Food Supplement and Multivitamin/Mineral: as above     Nutrition Goals  Patient to consume 75% of meals or oral nutritional supplements ordered TID    MONITORING AND EVALUATION:  Progress towards goals will be monitored and evaluated per protocol and Practice Guidelines    Rossana Jenkins RD, LD  Clinical Dietitian     3rd floor/ICU: [phone]  All other floors: [phone]  Weekend/holiday: [phone]  Office: [phone]

## 2022-08-08 NOTE — PLAN OF CARE
"Vss, no co pain/cp/sob. Tele SB 1AVB, now dc'd.  Forgetful, alarms on for safety, up to chair for meals with Ax1+gb+walker, BLE carleen, scattered bruising noted.  Nutrition and PT following.  Levaquin po, IV solumedrol changed to po prednisone.  K+ and mag protocols WDL, rechecks ordered for am. Back to [facility] once ready for discharge. Continue poc and monitoring.         Problem: Plan of Care - These are the overarching goals to be used throughout the patient stay.    Goal: Plan of Care Review/Shift Note  Description: The Plan of Care Review/Shift note should be completed every shift.  The Outcome Evaluation is a brief statement about your assessment that the patient is improving, declining, or no change.  This information will be displayed automatically on your shift note.  Outcome: Ongoing, Not Progressing  Goal: Patient-Specific Goal (Individualized)  Description: You can add care plan individualizations to a care plan. Examples of Individualization might be:  \"Parent requests to be called daily at 9am for status\", \"I have a hard time hearing out of my right ear\", or \"Do not touch me to wake me up as it startles me\".  Outcome: Ongoing, Not Progressing  Goal: Absence of Hospital-Acquired Illness or Injury  Outcome: Ongoing, Not Progressing  Intervention: Identify and Manage Fall Risk  Recent Flowsheet Documentation  Taken 8/8/2022 1429 by Maine Cast, RN  Safety Promotion/Fall Prevention:   chair alarm on   safety round/check completed  Taken 8/8/2022 1310 by Maine Cast, RN  Safety Promotion/Fall Prevention:   bed alarm on   safety round/check completed  Taken 8/8/2022 1253 by Maine Cast, RN  Safety Promotion/Fall Prevention:   bed alarm on   safety round/check completed  Taken 8/8/2022 1128 by Maine Cast, RN  Safety Promotion/Fall Prevention:   bed alarm on   safety round/check completed  Taken 8/8/2022 1016 by Maine Cast, RN  Safety Promotion/Fall Prevention: (NA at bedside performing " bed bath)   safety round/check completed   other (see comments)  Taken 8/8/2022 0908 by Maine Cast, RN  Safety Promotion/Fall Prevention:   bed alarm on   safety round/check completed  Taken 8/8/2022 0858 by Maine Cast RN  Safety Promotion/Fall Prevention:   fall prevention program maintained   treat underlying cause   treat reversible contributory factors   supervised activity   safety round/check completed   room organization consistent   room near nurse's station   room door open   patient and family education   nonskid shoes/slippers when out of bed   mobility aid in reach   lighting adjusted   increase visualization of patient   increased rounding and observation   clutter free environment maintained   activity supervised   assistive device/personal items within reach   bed alarm on  Taken 8/8/2022 0736 by Maine Cast RN  Safety Promotion/Fall Prevention:   bed alarm on   safety round/check completed  Intervention: Prevent Skin Injury  Recent Flowsheet Documentation  Taken 8/8/2022 0858 by Maine Cast RN  Body Position: position changed independently  Intervention: Prevent and Manage VTE (Venous Thromboembolism) Risk  Recent Flowsheet Documentation  Taken 8/8/2022 0858 by Maine Cast RN  VTE Prevention/Management: SCDs (sequential compression devices) off  Activity Management:   activity adjusted per tolerance   activity encouraged  Goal: Optimal Comfort and Wellbeing  Outcome: Ongoing, Not Progressing  Goal: Readiness for Transition of Care  Outcome: Ongoing, Not Progressing     Problem: Adjustment to Illness COPD (Chronic Obstructive Pulmonary Disease)  Goal: Optimal Chronic Illness Coping  Outcome: Ongoing, Not Progressing     Problem: Functional Ability Impaired COPD (Chronic Obstructive Pulmonary Disease)  Goal: Optimal Level of Functional [place]  Outcome: Ongoing, Not Progressing  Intervention: Optimize Functional Ability  Recent Flowsheet Documentation  Taken 8/8/2022  0858 by Maine Cast, RN  Activity Management:   activity adjusted per tolerance   activity encouraged     Problem: Infection COPD (Chronic Obstructive Pulmonary Disease)  Goal: Absence of Infection Signs and Symptoms  Outcome: Ongoing, Not Progressing     Problem: Oral Intake Inadequate COPD (Chronic Obstructive Pulmonary Disease)  Goal: Improved Nutrition Intake  Outcome: Ongoing, Not Progressing     Problem: Respiratory Compromise COPD (Chronic Obstructive Pulmonary Disease)  Goal: Effective Oxygenation and Ventilation  Outcome: Ongoing, Not Progressing  Intervention: Promote Airway Secretion Clearance  Recent Flowsheet Documentation  Taken 8/8/2022 0858 by Maine Cast RN  Cough And Deep Breathing: done independently per patient  Activity Management:   activity adjusted per tolerance   activity encouraged  Intervention: Optimize Oxygenation and Ventilation  Recent Flowsheet Documentation  Taken 8/8/2022 0858 by Maine Cast, RN  Head of Bed (HOB) Positioning: HOB at 20-30 degrees

## 2022-08-09 ENCOUNTER — APPOINTMENT (OUTPATIENT)
Dept: PHYSICAL THERAPY | Facility: CLINIC | Age: 87
DRG: 190 | End: 2022-08-09
Payer: MEDICARE

## 2022-08-09 LAB
ANION GAP SERPL CALCULATED.3IONS-SCNC: 5 MMOL/L (ref 7–15)
BUN SERPL-MCNC: 20.3 MG/DL (ref 8–23)
CALCIUM SERPL-MCNC: 8.2 MG/DL (ref 8.2–9.6)
CHLORIDE SERPL-SCNC: 93 MMOL/L (ref 98–107)
CREAT SERPL-MCNC: 0.71 MG/DL (ref 0.51–0.95)
CREAT SERPL-MCNC: 0.84 MG/DL (ref 0.51–0.95)
DEPRECATED HCO3 PLAS-SCNC: 32 MMOL/L (ref 22–29)
ERYTHROCYTE [DISTWIDTH] IN BLOOD BY AUTOMATED COUNT: 16 % (ref 10–15)
GFR SERPL CREATININE-BSD FRML MDRD: 66 ML/MIN/1.73M2
GFR SERPL CREATININE-BSD FRML MDRD: 80 ML/MIN/1.73M2
GLUCOSE SERPL-MCNC: 81 MG/DL (ref 70–99)
HCT VFR BLD AUTO: 29 % (ref 35–47)
HGB BLD-MCNC: 8.6 G/DL (ref 11.7–15.7)
MAGNESIUM SERPL-MCNC: 1.8 MG/DL (ref 1.7–2.3)
MCH RBC QN AUTO: 29.4 PG (ref 26.5–33)
MCHC RBC AUTO-ENTMCNC: 29.7 G/DL (ref 31.5–36.5)
MCV RBC AUTO: 99 FL (ref 78–100)
PLATELET # BLD AUTO: 55 10E3/UL (ref 150–450)
POTASSIUM SERPL-SCNC: 4.3 MMOL/L (ref 3.4–5.3)
RBC # BLD AUTO: 2.93 10E6/UL (ref 3.8–5.2)
SODIUM SERPL-SCNC: 130 MMOL/L (ref 136–145)
WBC # BLD AUTO: 88.3 10E3/UL (ref 4–11)

## 2022-08-09 PROCEDURE — 80048 BASIC METABOLIC PNL TOTAL CA: CPT | Performed by: INTERNAL MEDICINE

## 2022-08-09 PROCEDURE — 83735 ASSAY OF MAGNESIUM: CPT | Performed by: INTERNAL MEDICINE

## 2022-08-09 PROCEDURE — 99233 SBSQ HOSP IP/OBS HIGH 50: CPT | Performed by: INTERNAL MEDICINE

## 2022-08-09 PROCEDURE — 97116 GAIT TRAINING THERAPY: CPT | Mod: GP | Performed by: PHYSICAL THERAPIST

## 2022-08-09 PROCEDURE — 36415 COLL VENOUS BLD VENIPUNCTURE: CPT | Performed by: INTERNAL MEDICINE

## 2022-08-09 PROCEDURE — 250N000012 HC RX MED GY IP 250 OP 636 PS 637: Performed by: INTERNAL MEDICINE

## 2022-08-09 PROCEDURE — 999N000157 HC STATISTIC RCP TIME EA 10 MIN

## 2022-08-09 PROCEDURE — 94640 AIRWAY INHALATION TREATMENT: CPT | Mod: 76

## 2022-08-09 PROCEDURE — 250N000013 HC RX MED GY IP 250 OP 250 PS 637: Performed by: STUDENT IN AN ORGANIZED HEALTH CARE EDUCATION/TRAINING PROGRAM

## 2022-08-09 PROCEDURE — 250N000009 HC RX 250: Performed by: INTERNAL MEDICINE

## 2022-08-09 PROCEDURE — 94640 AIRWAY INHALATION TREATMENT: CPT

## 2022-08-09 PROCEDURE — 97530 THERAPEUTIC ACTIVITIES: CPT | Mod: GP | Performed by: PHYSICAL THERAPIST

## 2022-08-09 PROCEDURE — 120N000001 HC R&B MED SURG/OB

## 2022-08-09 PROCEDURE — 250N000013 HC RX MED GY IP 250 OP 250 PS 637: Performed by: INTERNAL MEDICINE

## 2022-08-09 PROCEDURE — 85027 COMPLETE CBC AUTOMATED: CPT | Performed by: INTERNAL MEDICINE

## 2022-08-09 RX ORDER — FUROSEMIDE 20 MG
20 TABLET ORAL DAILY
Status: DISCONTINUED | OUTPATIENT
Start: 2022-08-10 | End: 2022-08-11 | Stop reason: HOSPADM

## 2022-08-09 RX ORDER — LEVOFLOXACIN 250 MG/1
250 TABLET, FILM COATED ORAL DAILY
Status: DISCONTINUED | OUTPATIENT
Start: 2022-08-09 | End: 2022-08-11 | Stop reason: HOSPADM

## 2022-08-09 RX ADMIN — TRAZODONE HYDROCHLORIDE 50 MG: 50 TABLET ORAL at 22:04

## 2022-08-09 RX ADMIN — PREDNISONE 40 MG: 20 TABLET ORAL at 09:14

## 2022-08-09 RX ADMIN — GUAIFENESIN 600 MG: 600 TABLET, EXTENDED RELEASE ORAL at 09:14

## 2022-08-09 RX ADMIN — LEVOFLOXACIN 250 MG: 250 TABLET, FILM COATED ORAL at 14:00

## 2022-08-09 RX ADMIN — LEVOTHYROXINE SODIUM 88 MCG: 0.09 TABLET ORAL at 09:14

## 2022-08-09 RX ADMIN — ESCITALOPRAM 10 MG: 5 TABLET, FILM COATED ORAL at 09:14

## 2022-08-09 RX ADMIN — FERROUS SULFATE TAB 325 MG (65 MG ELEMENTAL FE) 325 MG: 325 (65 FE) TAB at 09:14

## 2022-08-09 RX ADMIN — ASPIRIN 81 MG: 81 TABLET, COATED ORAL at 21:04

## 2022-08-09 RX ADMIN — BUDESONIDE 0.5 MG: 0.5 INHALANT RESPIRATORY (INHALATION) at 08:48

## 2022-08-09 RX ADMIN — METOPROLOL SUCCINATE 25 MG: 25 TABLET, FILM COATED, EXTENDED RELEASE ORAL at 09:14

## 2022-08-09 RX ADMIN — BUDESONIDE 0.5 MG: 0.5 INHALANT RESPIRATORY (INHALATION) at 20:17

## 2022-08-09 RX ADMIN — GUAIFENESIN 600 MG: 600 TABLET, EXTENDED RELEASE ORAL at 21:04

## 2022-08-09 RX ADMIN — FUROSEMIDE 40 MG: 20 TABLET ORAL at 09:58

## 2022-08-09 ASSESSMENT — ACTIVITIES OF DAILY LIVING (ADL)
ADLS_ACUITY_SCORE: 33
ADLS_ACUITY_SCORE: 33
ADLS_ACUITY_SCORE: 30
ADLS_ACUITY_SCORE: 36
ADLS_ACUITY_SCORE: 36
ADLS_ACUITY_SCORE: 30
ADLS_ACUITY_SCORE: 36
ADLS_ACUITY_SCORE: 32
ADLS_ACUITY_SCORE: 36
ADLS_ACUITY_SCORE: 30
ADLS_ACUITY_SCORE: 32
ADLS_ACUITY_SCORE: 32

## 2022-08-09 NOTE — PLAN OF CARE
Goal Outcome Evaluation:    Plan of Care Reviewed With: patient   Patient alert and oriented after first awakened, and calm and cooperative. Remains on 02@ 2 L/min per nasal cannula with no c/o shortness of breath.  See flow sheets.  Up to bathroom with transfer belt and walker, with assist of 1 without any problems.

## 2022-08-09 NOTE — PROGRESS NOTES
[facility]    Medicine Progress Note - Hospitalist Service  Date of Admission: 8/6/2022     Reason for stay    Acute COPD exacerbation    Acute bacterial bronchitis    Assessment & Plan         Marie Kline is a 89 year old female with past medical history significant for C OPD on chronic oxygen, coronary artery disease, CHF, CLL, pulmonary nodules, hypertension, CKD, hypothyroidism, and hyperlipidemia who presented to [facility] on 8/6/2022 with productive cough and increased shortness of breath and was found to have COPD exacerbation with likely infective bronchitis.    Acute COPD Exacerbation  Productive Cough  Dyspnea  History of COPD chronically on 2 L of oxygen at home.  Now with reported history of increased shortness of breath and productive cough.  Was not requiring any increased oxygen requirements to maintain saturations, but does complain of significant dyspnea still.  Continue treatment for COPD exacerbation and respiratory fluoroquinolone as below.  -Budesonide neb 0.5 mg twice daily  -Albuterol neb every 2 hours as needed  -Has been on methylprednisolone 40 mg IV every 12 hours  Changed to  oral prednisone 40 mg once a day   -Levofloxacin 250 mg every day   -Mucinex 600mg BID  -- Breathing is much better today and appears to be at baseline due to generalized weakness and deconditioning is a major concern at this time.  PT and OT service following and patient most likely benefit from TCU on discharge    Elevated Troponin: Troponin 60. ECG without ST dynamic changes. No symptoms.  Malnutrition: BMI 16. Nutrition consult.  CLL: .4 (baseline to 140s). Follows with Dr. Ivory in [place] [facility]. On IVIG infusions.  Mild Hyponatremia: Na 131, likely due to poor oral intake. Monitoring.  Chronic Anemia: Hgb at baseline ~9.1.   Chronic Thrombocytopenia: PLT 66; at baseline. No evidence of bleeding.  Hypothyroidism: Continue PTA levothyroxine.  Malnutrition: Dietitian  "consulted for further assessment and recommendations.  Physical deconditioning: PT OT and discharge planning     Diet: Orders Placed This Encounter      Combination Diet Regular Diet Adult  DVT Prophylaxis: Pneumatic Compression Devices  Rees: None  Code Status: No CPR- Do NOT Intubate    Family update: Spoke with patient's daughter Margaret regarding concern about discharge home. She would like to be contacted for TCU facility preference.  Expected discharge: Likely TCU in the next 1 to 2 days.  ______________________________________________________________________    Interval History     Patient was seen and examined by me at bedside.  Patient is very weak otherwise no significant shortness of breath or chest pain.  She denies any fever or chills.  I spoke with patient's daughter over the phone and her concern was discussed.  Family very concerned about her returning physical deconditioning.  Care plan was discussed with social service and bedside nurse.    A full 10+ point review of systems was performed and found to be negative with the exception of those items noted here.    Physical Exam   Temp: 97.7  F (36.5  C) (after neb) Temp src: Oral BP: 128/44 Pulse: 71   Resp: 18 SpO2: 93 % O2 Device: Nasal cannula Oxygen Delivery: 3 LPM Height: 149.9 cm (4' 11\") Weight: 40.4 kg (89 lb)  Estimated body mass index is 17.98 kg/m  as calculated from the following:    Height as of this encounter: 1.499 m (4' 11\").    Weight as of this encounter: 40.4 kg (89 lb).    General: Very pleasant female resting comfortably in hospital bed.  Awake, alert, interactive.  HEENT: Normocephalic, atraumatic.  PERRL, EOMI.  Conjunctiva clear, sclerae anicteric.  Mucous membranes moist.  Cardiac: Regular rate and rhythm without murmur, gallop, or rub.  No peripheral edema.  Respiratory: Significant coughing with clear mucus production. Rales to right side, clear with coughing. Poor aeration to bases. No wheezing.  GI: Normal, active bowel " sounds.  Abdomen soft, nontender, nondistended.  : Deferred.  Musculoskeletal: Moving all extremities appropriately.  Skin: No rashes or abrasions on exposed skin.  Neurologic: Alert and oriented x4.  Cranial nerves II through XII grossly intact.  Psychologic: Appropriate mood and affect.    Data   All laboratory results and other diagnostic data from the past 24 hours is available in Epic and has been personally reviewed.    Recent Labs   Lab 08/09/22  0623 08/08/22  0810 08/07/22  0618 08/06/22  1742 08/06/22  1221   WBC 88.3*  --  102.4*  --  75.3*   HGB 8.6*  --  9.1*  --  9.7*   MCV 99  --  99  --  101*   PLT 55*  --  66*  --  69*   *  --  131*  --  135*   POTASSIUM 4.3 4.8 4.3  --  4.7   CHLORIDE 93*  --  94*  --  95*   CO2 32*  --  32*  --  33*   BUN 20.3  --  18.2  --  9.3   CR 0.71 0.84 0.91   < > 0.96*   ANIONGAP 5*  --  5*  --  7   CARLOS 8.2  --  8.6  --  8.5   GLC 81  --  129*  --  88    < > = values in this interval not displayed.     No results found for this or any previous visit (from the past 24 hour(s)).  I personally reviewed: no images or EKG's today.

## 2022-08-09 NOTE — PLAN OF CARE
"VSS on 2L via NC, baseline for pt. Mildly dyspneic upon exertion, no SOB at rest. Denies CP. Denies N/V/D. Voiding well, up with assist of 1, GB & walker. Generalized weakness. Denies pain. A/Ox4 but forgetful. 1 SL PIV. Good appetite. Discharge to TCU r/t weakness when ready. Continue monitoring.     Problem: Plan of Care - These are the overarching goals to be used throughout the patient stay.    Goal: Plan of Care Review/Shift Note  Description: The Plan of Care Review/Shift note should be completed every shift.  The Outcome Evaluation is a brief statement about your assessment that the patient is improving, declining, or no change.  This information will be displayed automatically on your shift note.  Outcome: Ongoing, Not Progressing  Goal: Patient-Specific Goal (Individualized)  Description: You can add care plan individualizations to a care plan. Examples of Individualization might be:  \"Parent requests to be called daily at 9am for status\", \"I have a hard time hearing out of my right ear\", or \"Do not touch me to wake me up as it startles me\".  Outcome: Ongoing, Not Progressing  Goal: Absence of Hospital-Acquired Illness or Injury  Outcome: Ongoing, Not Progressing  Intervention: Identify and Manage Fall Risk  Recent Flowsheet Documentation  Taken 8/9/2022 0924 by Lana Ann RN  Safety Promotion/Fall Prevention:    activity supervised    assistive device/personal items within reach    supervised activity    safety round/check completed    room organization consistent    room door open    patient and family education    nonskid shoes/slippers when out of bed    lighting adjusted    increase visualization of patient    increased rounding and observation    fall prevention program maintained    clutter free environment maintained  Intervention: Prevent and Manage VTE (Venous Thromboembolism) Risk  Recent Flowsheet Documentation  Taken 8/9/2022 1300 by Lana Ann, RN  Activity Management: ambulated " to bathroom  Taken 8/9/2022 0924 by Lana Ann RN  VTE Prevention/Management: SCDs (sequential compression devices) off  Activity Management: activity adjusted per tolerance  Intervention: Prevent Infection  Recent Flowsheet Documentation  Taken 8/9/2022 0924 by Lana Ann RN  Infection Prevention:    rest/sleep promoted    personal protective equipment utilized    hand hygiene promoted  Goal: Optimal Comfort and Wellbeing  Outcome: Ongoing, Not Progressing  Goal: Readiness for Transition of Care  Outcome: Ongoing, Not Progressing     Problem: Heart Failure Comorbidity  Goal: Maintenance of Heart Failure Symptom Control  Outcome: Ongoing, Not Progressing     Problem: Hypertension Comorbidity  Goal: Blood Pressure in Desired Range  Outcome: Ongoing, Not Progressing  Intervention: Maintain Blood Pressure Management  Recent Flowsheet Documentation  Taken 8/9/2022 0924 by Lana Ann RN  Medication Review/Management: medications reviewed     Problem: Electrolyte Imbalance (Chronic Kidney Disease)  Goal: Electrolyte Balance  Outcome: Ongoing, Not Progressing     Problem: Oral Intake Inadequate (Chronic Kidney Disease)  Goal: Optimal Oral Intake  Outcome: Ongoing, Not Progressing     Problem: Adjustment to Illness COPD (Chronic Obstructive Pulmonary Disease)  Goal: Optimal Chronic Illness Coping  Outcome: Ongoing, Not Progressing     Problem: Functional Ability Impaired COPD (Chronic Obstructive Pulmonary Disease)  Goal: Optimal Level of Functional [place]  Outcome: Ongoing, Not Progressing  Intervention: Optimize Functional Ability  Recent Flowsheet Documentation  Taken 8/9/2022 1300 by Lana Ann RN  Activity Management: ambulated to bathroom  Taken 8/9/2022 0924 by Lana Ann RN  Activity Management: activity adjusted per tolerance     Problem: Infection COPD (Chronic Obstructive Pulmonary Disease)  Goal: Absence of Infection Signs and Symptoms  Outcome: Ongoing, Not  Progressing     Problem: Oral Intake Inadequate COPD (Chronic Obstructive Pulmonary Disease)  Goal: Improved Nutrition Intake  Outcome: Ongoing, Not Progressing     Problem: Respiratory Compromise COPD (Chronic Obstructive Pulmonary Disease)  Goal: Effective Oxygenation and Ventilation  Outcome: Ongoing, Not Progressing  Intervention: Promote Airway Secretion Clearance  Recent Flowsheet Documentation  Taken 8/9/2022 1300 by Lana Ann, RN  Activity Management: ambulated to bathroom  Taken 8/9/2022 0924 by Lana Ann, RN  Cough And Deep Breathing: done independently per patient  Activity Management: activity adjusted per tolerance

## 2022-08-09 NOTE — CONSULTS
Care Management Follow Up    Length of Stay (days): 3    Expected Discharge Date: 08/10/2022     Concerns to be Addressed:       Patient plan of care discussed at interdisciplinary rounds: Yes    Anticipated Discharge Disposition: Assisted Living     Anticipated Discharge Services: TCU    Patient/Family in Agreement with the Plan: yes    Referrals Placed by CM/SW: yes  Private pay costs discussed: private room/amenity fees    Additional Information:    CM consulted to follow up regarding discharge planning. Patient and daughter are requesting she goes to TCU due continued weakness. Patient prefers [facility] since she has been there before and referral was sent. CM discussed private room fees with patient and she requested that CM calls her daughter, Margaret. Margaret is agreeable to TCU shared or private room and would prefer whatever is available first. Margaret will provide transport to TCU when patient discharges.     Hansa Silver      ADDENDUM 1430:  Call back received from [facility] admissions. They are asking about patient's revefenacin inhaler as this is an expensive medication for them to supply. They are asking if it could be substituted with anything else while she is in TCU? [facility] would have a bed for patient on Thursday. MD paged and updated.

## 2022-08-09 NOTE — PLAN OF CARE
Goal Outcome Evaluation:        Admitting Diagnosis:COPD Exacerbation  Pertinent History: COPD, on chronic O2 2L NC, CAD, CHF, pulmonary nodules, HTN, CKD.  For vitals and assessment please see flow sheet.   Living Situation:correction.   Pain plan: denies pain  Mobility: assistance, 1 person/gb/w  Baseline activity: with assistive equipment.   Alarms/Safety:BA.   LDA's: PIV.   Pertinent test results:K+ 4.8, Mg+ 1.7.  Consults:PT/Respiratory following.  Abnormals/Pending:none  Other Cares/Comments: A & O, forgetful, VSS, LS diminished.O2 92% 2L NC.   Discharge Disposition:Possible discharge tomorrow.   Discharge Time: TBD.

## 2022-08-10 ENCOUNTER — APPOINTMENT (OUTPATIENT)
Dept: PHYSICAL THERAPY | Facility: CLINIC | Age: 87
DRG: 190 | End: 2022-08-10
Payer: MEDICARE

## 2022-08-10 LAB
MAGNESIUM SERPL-MCNC: 1.8 MG/DL (ref 1.7–2.3)
POTASSIUM SERPL-SCNC: 4 MMOL/L (ref 3.4–5.3)

## 2022-08-10 PROCEDURE — 83735 ASSAY OF MAGNESIUM: CPT | Performed by: INTERNAL MEDICINE

## 2022-08-10 PROCEDURE — 94640 AIRWAY INHALATION TREATMENT: CPT | Mod: 76

## 2022-08-10 PROCEDURE — 250N000012 HC RX MED GY IP 250 OP 636 PS 637: Performed by: INTERNAL MEDICINE

## 2022-08-10 PROCEDURE — 97530 THERAPEUTIC ACTIVITIES: CPT | Mod: GP | Performed by: PHYSICAL THERAPIST

## 2022-08-10 PROCEDURE — 250N000013 HC RX MED GY IP 250 OP 250 PS 637: Performed by: INTERNAL MEDICINE

## 2022-08-10 PROCEDURE — 84132 ASSAY OF SERUM POTASSIUM: CPT | Performed by: INTERNAL MEDICINE

## 2022-08-10 PROCEDURE — 250N000009 HC RX 250: Performed by: INTERNAL MEDICINE

## 2022-08-10 PROCEDURE — 36415 COLL VENOUS BLD VENIPUNCTURE: CPT | Performed by: INTERNAL MEDICINE

## 2022-08-10 PROCEDURE — 250N000013 HC RX MED GY IP 250 OP 250 PS 637: Performed by: STUDENT IN AN ORGANIZED HEALTH CARE EDUCATION/TRAINING PROGRAM

## 2022-08-10 PROCEDURE — 99232 SBSQ HOSP IP/OBS MODERATE 35: CPT | Performed by: INTERNAL MEDICINE

## 2022-08-10 PROCEDURE — 97116 GAIT TRAINING THERAPY: CPT | Mod: GP | Performed by: PHYSICAL THERAPIST

## 2022-08-10 PROCEDURE — 999N000157 HC STATISTIC RCP TIME EA 10 MIN

## 2022-08-10 PROCEDURE — 94640 AIRWAY INHALATION TREATMENT: CPT

## 2022-08-10 PROCEDURE — 120N000001 HC R&B MED SURG/OB

## 2022-08-10 RX ADMIN — GUAIFENESIN 600 MG: 600 TABLET, EXTENDED RELEASE ORAL at 08:35

## 2022-08-10 RX ADMIN — GUAIFENESIN 600 MG: 600 TABLET, EXTENDED RELEASE ORAL at 20:22

## 2022-08-10 RX ADMIN — FUROSEMIDE 20 MG: 20 TABLET ORAL at 08:34

## 2022-08-10 RX ADMIN — FERROUS SULFATE TAB 325 MG (65 MG ELEMENTAL FE) 325 MG: 325 (65 FE) TAB at 08:35

## 2022-08-10 RX ADMIN — BUDESONIDE 0.5 MG: 0.5 INHALANT RESPIRATORY (INHALATION) at 07:18

## 2022-08-10 RX ADMIN — TRAZODONE HYDROCHLORIDE 50 MG: 50 TABLET ORAL at 21:59

## 2022-08-10 RX ADMIN — LEVOFLOXACIN 250 MG: 250 TABLET, FILM COATED ORAL at 08:35

## 2022-08-10 RX ADMIN — ASPIRIN 81 MG: 81 TABLET, COATED ORAL at 20:22

## 2022-08-10 RX ADMIN — PREDNISONE 40 MG: 20 TABLET ORAL at 08:35

## 2022-08-10 RX ADMIN — LEVOTHYROXINE SODIUM 88 MCG: 0.09 TABLET ORAL at 08:34

## 2022-08-10 RX ADMIN — METOPROLOL SUCCINATE 25 MG: 25 TABLET, FILM COATED, EXTENDED RELEASE ORAL at 08:34

## 2022-08-10 RX ADMIN — BUDESONIDE 0.5 MG: 0.5 INHALANT RESPIRATORY (INHALATION) at 19:51

## 2022-08-10 RX ADMIN — ESCITALOPRAM 10 MG: 5 TABLET, FILM COATED ORAL at 08:34

## 2022-08-10 ASSESSMENT — ACTIVITIES OF DAILY LIVING (ADL)
ADLS_ACUITY_SCORE: 30
ADLS_ACUITY_SCORE: 32
ADLS_ACUITY_SCORE: 30
ADLS_ACUITY_SCORE: 36
ADLS_ACUITY_SCORE: 32
ADLS_ACUITY_SCORE: 30
ADLS_ACUITY_SCORE: 30
ADLS_ACUITY_SCORE: 36
ADLS_ACUITY_SCORE: 30
ADLS_ACUITY_SCORE: 32

## 2022-08-10 NOTE — PROGRESS NOTES
Care Management Note    Discharge Date: 08/11/2022       Discharge Disposition: [facility]    Discharge Services: none     Discharge DME: daughter requesting nebulizer on discharge      Discharge Transportation: family or friend will provide    Private pay costs discussed: private room/amenity fees and transportation costs    PAS Confirmation Code:  [id number]  Patient/family educated on Medicare website which has current facility and service quality ratings: yes     Education Provided on the Discharge Plan:  yes  Persons Notified of Discharge Plans: MD, bedside RN, daughter Margaret  Patient/Family in Agreement with the Plan: yes    Handoff Referral Completed: Yes    Additional Information:  CM following for discharge planning to Transitional Care Unit on discharge. Spoke to [facility] yesterday. They would have a bed for patient pending her inhaler revefenacin and if that could be switched to a similar formulary while at Transitional Care Unit. Page placed to MD to discuss inhaler. He states patient could switch to spiriva while in Transitional Care Unit.     Call placed to admissions at [facility] to update on possibility of inhaler change. They can take patient into a private bed tomorrow 8/11 anytime after 1230.     Spoke to daughter about discharge to [facility]. They would like to accept the bed for tomorrow and will be transporting patient. CM will finalize plan for transport tomorrow when discharge orders received. Daughter also states patient will need a new nebulizer on discharge. Paged MD to update on need for order for nebulizer and discharge time.      You have successfully submitted the preadmission screening (PAS) to the [employer] on:  Created On  8/10/2022 1:49 PM  Your confirmation number is:  [id number]                Cherise Galeano RN BSN CM  Inpatient Care Coordination  [facility]  [phone]

## 2022-08-10 NOTE — PROGRESS NOTES
[facility]    Medicine Progress Note - Hospitalist Service  Date of Admission: 8/6/2022     Reason for stay    Acute COPD exacerbation    Acute bacterial bronchitis    Assessment & Plan         Marie Kline is a 89 year old female with past medical history significant for C OPD on chronic oxygen, coronary artery disease, CHF, CLL, pulmonary nodules, hypertension, CKD, hypothyroidism, and hyperlipidemia who presented to [facility] on 8/6/2022 with productive cough and increased shortness of breath and was found to have COPD exacerbation with likely infective bronchitis.    Acute COPD Exacerbation  Productive Cough  Dyspnea  History of COPD chronically on 2 L of oxygen at home.  Now with reported history of increased shortness of breath and productive cough.  Was not requiring any increased oxygen requirements to maintain saturations, but does complain of significant dyspnea still.  Continue treatment for COPD exacerbation and respiratory fluoroquinolone as below.  -Budesonide neb 0.5 mg twice daily  -Albuterol neb every 2 hours as needed  -Has been on methylprednisolone 40 mg IV every 12 hours  Changed to  oral prednisone 40 mg once a day   -Levofloxacin 250 mg every day   -Mucinex 600mg BID  - Breathing is much better today and appears to be at baseline due to generalized weakness and deconditioning is a major concern at this time.  PT and OT service following and patient most likely benefit from TCU on discharge    Elevated Troponin: Troponin 60. ECG without ST dynamic changes. No symptoms.  Malnutrition: BMI 16. Nutrition consult.  CLL: .4 (baseline to 140s). Follows with Dr. Ivory in [place] [facility]. On IVIG infusions.  Mild Hyponatremia: Na 131, likely due to poor oral intake. Monitoring.  Chronic Anemia: Hgb at baseline ~9.1.   Chronic Thrombocytopenia: PLT 66; at baseline. No evidence of bleeding.  Hypothyroidism: Continue PTA levothyroxine.  Malnutrition: Dietitian  consulted for further assessment and recommendations.  Physical deconditioning: PT OT and discharge planning     Diet: Orders Placed This Encounter      Combination Diet Regular Diet Adult  DVT Prophylaxis: Pneumatic Compression Devices  Rees: None  Code Status: No CPR- Do NOT Intubate    Family update: Spoke with patient's daughter Margaret regarding concern about discharge home. She would like to be contacted for TCU facility preference.  Expected discharge: Likely TCU tomorrow.      Discussed with social service.  Patient will be discharged tomorrow, there is a concern regarding one of her inhalers, revefenacin from the TCU, requesting to change to another formulation when she is not TCU.  _____________________________________________________________  Interval History     Patient was seen and examined, assumed care today, no new overnight issues, remained weak, respiratory status at baseline, denied any chest pain, fever or chills.     A full 10+ point review of systems was performed and found to be negative with the exception of those items noted here.    Current Facility-Administered Medications   Medication     acetaminophen (TYLENOL) tablet 650 mg    Or     acetaminophen (TYLENOL) Suppository 650 mg     albuterol (PROVENTIL) neb solution 2.5 mg     aspirin EC tablet 81 mg     budesonide (PULMICORT) neb solution 0.5 mg     escitalopram (LEXAPRO) tablet 10 mg     ferrous sulfate (FEROSUL) tablet 325 mg     furosemide (LASIX) tablet 20 mg     guaiFENesin (MUCINEX) 12 hr tablet 600 mg     ipratropium - albuterol 0.5 mg/2.5 mg/3 mL (DUONEB) neb solution 3 mL     levofloxacin (LEVAQUIN) tablet 250 mg     levothyroxine (SYNTHROID/LEVOTHROID) tablet 88 mcg     lidocaine (LMX4) cream     lidocaine 1 % 0.1-1 mL     loperamide (IMODIUM) capsule 2 mg     melatonin tablet 1 mg     metoprolol succinate ER (TOPROL XL) 24 hr tablet 25 mg     ondansetron (ZOFRAN ODT) ODT tab 4 mg    Or     ondansetron (ZOFRAN) injection 4 mg  "    pramoxine-calamine (AVEENO) lotion     predniSONE (DELTASONE) tablet 40 mg     prochlorperazine (COMPAZINE) injection 5 mg    Or     prochlorperazine (COMPAZINE) tablet 5 mg    Or     prochlorperazine (COMPAZINE) suppository 12.5 mg     Revefenacin SOLN 175 mcg     sodium chloride (PF) 0.9% PF flush 3 mL     sodium chloride (PF) 0.9% PF flush 3 mL     traZODone (DESYREL) tablet 50 mg     Physical Exam   Temp: 98.1  F (36.7  C) Temp src: Oral BP: 139/52 Pulse: 57   Resp: 18 SpO2: 100 % O2 Device: Nasal cannula Oxygen Delivery: 3 LPM Height: 149.9 cm (4' 11\") Weight: 40.3 kg (88 lb 12.8 oz)  Estimated body mass index is 17.94 kg/m  as calculated from the following:    Height as of this encounter: 1.499 m (4' 11\").    Weight as of this encounter: 40.3 kg (88 lb 12.8 oz).    General: Very pleasant female resting comfortably in hospital bed.  Awake, alert, interactive, chronically sick looking..  HEENT: Normocephalic, atraumatic.  PERRL, EOMI.  Conjunctiva clear, sclerae anicteric.  Mucous membranes moist.  Cardiac: Regular rate and rhythm without murmur, gallop, or rub.  No peripheral edema.  Respiratory: Tight chest, scattered wheezing and prolonged expiratory phases.  GI: Normal, active bowel sounds.  Abdomen soft, nontender, nondistended.  : Deferred.  Musculoskeletal: Moving all extremities appropriately.  Skin: No rashes or abrasions on exposed skin.  Neurologic: Alert and oriented x4.  Cranial nerves II through XII grossly intact.  Psychologic: Appropriate mood and affect.    Data   All laboratory results and other diagnostic data from the past 24 hours is available in Epic and has been personally reviewed.    Recent Labs   Lab 08/10/22  0623 08/09/22  0623 08/08/22  0810 08/07/22  0618 08/06/22  1742 08/06/22  1221   WBC  --  88.3*  --  102.4*  --  75.3*   HGB  --  8.6*  --  9.1*  --  9.7*   MCV  --  99  --  99  --  101*   PLT  --  55*  --  66*  --  69*   NA  --  130*  --  131*  --  135*   POTASSIUM 4.0 4.3 " 4.8 4.3  --  4.7   CHLORIDE  --  93*  --  94*  --  95*   CO2  --  32*  --  32*  --  33*   BUN  --  20.3  --  18.2  --  9.3   CR  --  0.71 0.84 0.91   < > 0.96*   ANIONGAP  --  5*  --  5*  --  7   CARLOS  --  8.2  --  8.6  --  8.5   GLC  --  81  --  129*  --  88    < > = values in this interval not displayed.     No results found for this or any previous visit (from the past 24 hour(s)).  I personally reviewed: no images or EKG's today.

## 2022-08-10 NOTE — PLAN OF CARE
Temp: 98.9  F (37.2  C) Temp src: Oral BP: 130/65 Pulse: 77   Resp: 16 SpO2: 97 % O2 Device: Nasal cannula Oxygen Delivery: 3 LPM     Orientation:  x4, very pleasant, seated in chair most of the evening    VS: stable   Pain:  denies   Activity:  in chair most of the shift, ambulates with walker and gb to bathroom, chair and bed   Resp:   no SOB reported. LS clear, diminished, O2 stat stable at 96%  GI:  used bathroom, stated had BM   : voids spontaneously   Notable Labs:  K and mag in range, recheck tomorrow. WBC 88.3!   Lines: PIV SL and patent   Plan:   TCU placement within the next day or two, continue with plan of care

## 2022-08-10 NOTE — PLAN OF CARE
Goal Outcome Evaluation:      Pt is alert and oriented, forgetful at times. Pt up with standby assist, walker and gait belt. Pt voiding without difficulty. Lungs sound clear, on her home oxygen at 3 liters. Probable discharge to [facility] on Thursday.

## 2022-08-10 NOTE — PLAN OF CARE
"VSS on 3L O2 (baseline per pt). A/O x4, forgetful. Pleasant. Up w/ SBA, gait belt & walker. Good, infrequent cough.       Problem: Plan of Care - These are the overarching goals to be used throughout the patient stay.    Goal: Plan of Care Review/Shift Note  Description: The Plan of Care Review/Shift note should be completed every shift.  The Outcome Evaluation is a brief statement about your assessment that the patient is improving, declining, or no change.  This information will be displayed automatically on your shift note.  Outcome: Ongoing, Progressing  Goal: Patient-Specific Goal (Individualized)  Description: You can add care plan individualizations to a care plan. Examples of Individualization might be:  \"Parent requests to be called daily at 9am for status\", \"I have a hard time hearing out of my right ear\", or \"Do not touch me to wake me up as it startles me\".  Outcome: Ongoing, Progressing  Goal: Absence of Hospital-Acquired Illness or Injury  Outcome: Ongoing, Progressing  Intervention: Identify and Manage Fall Risk  Recent Flowsheet Documentation  Taken 8/10/2022 1311 by Lana Ann RN  Safety Promotion/Fall Prevention: chair alarm on  Taken 8/10/2022 0843 by Lana Ann RN  Safety Promotion/Fall Prevention:    activity supervised    assistive device/personal items within reach  Intervention: Prevent and Manage VTE (Venous Thromboembolism) Risk  Recent Flowsheet Documentation  Taken 8/10/2022 1311 by Lana Ann, RN  Activity Management:    ambulated to bathroom    up in chair  Taken 8/10/2022 0843 by Lana Ann RN  VTE Prevention/Management: SCDs (sequential compression devices) off  Activity Management: activity adjusted per tolerance  Intervention: Prevent Infection  Recent Flowsheet Documentation  Taken 8/10/2022 0843 by Lana Ann RN  Infection Prevention:    personal protective equipment utilized    hand hygiene promoted    equipment surfaces disinfected    " environmental surveillance performed    rest/sleep promoted    single patient room provided  Goal: Optimal Comfort and Wellbeing  Outcome: Ongoing, Progressing  Goal: Readiness for Transition of Care  Outcome: Ongoing, Progressing     Problem: Heart Failure Comorbidity  Goal: Maintenance of Heart Failure Symptom Control  Outcome: Ongoing, Progressing     Problem: Hypertension Comorbidity  Goal: Blood Pressure in Desired Range  Outcome: Ongoing, Progressing     Problem: Electrolyte Imbalance (Chronic Kidney Disease)  Goal: Electrolyte Balance  Outcome: Ongoing, Progressing     Problem: Oral Intake Inadequate (Chronic Kidney Disease)  Goal: Optimal Oral Intake  Outcome: Ongoing, Progressing     Problem: Adjustment to Illness COPD (Chronic Obstructive Pulmonary Disease)  Goal: Optimal Chronic Illness Coping  Outcome: Ongoing, Progressing     Problem: Functional Ability Impaired COPD (Chronic Obstructive Pulmonary Disease)  Goal: Optimal Level of Functional [place]  Outcome: Ongoing, Progressing  Intervention: Optimize Functional Ability  Recent Flowsheet Documentation  Taken 8/10/2022 1311 by Lana Ann RN  Activity Management:    ambulated to bathroom    up in chair  Taken 8/10/2022 0843 by Lana Ann, RN  Activity Management: activity adjusted per tolerance     Problem: Infection COPD (Chronic Obstructive Pulmonary Disease)  Goal: Absence of Infection Signs and Symptoms  Outcome: Ongoing, Progressing     Problem: Oral Intake Inadequate COPD (Chronic Obstructive Pulmonary Disease)  Goal: Improved Nutrition Intake  Outcome: Ongoing, Progressing     Problem: Respiratory Compromise COPD (Chronic Obstructive Pulmonary Disease)  Goal: Effective Oxygenation and Ventilation  Outcome: Ongoing, Progressing  Intervention: Promote Airway Secretion Clearance  Recent Flowsheet Documentation  Taken 8/10/2022 1311 by Lana Ann RN  Activity Management:    ambulated to bathroom    up in chair  Taken  8/10/2022 0843 by Lana Ann RN  Cough And Deep Breathing: done independently per patient  Activity Management: activity adjusted per tolerance

## 2022-08-11 ENCOUNTER — PATIENT OUTREACH (OUTPATIENT)
Dept: CARE COORDINATION | Facility: CLINIC | Age: 87
End: 2022-08-11

## 2022-08-11 ENCOUNTER — LAB REQUISITION (OUTPATIENT)
Dept: LAB | Facility: CLINIC | Age: 87
End: 2022-08-11
Payer: MEDICARE

## 2022-08-11 VITALS
SYSTOLIC BLOOD PRESSURE: 125 MMHG | BODY MASS INDEX: 17.9 KG/M2 | RESPIRATION RATE: 16 BRPM | HEART RATE: 65 BPM | HEIGHT: 59 IN | WEIGHT: 88.8 LBS | OXYGEN SATURATION: 100 % | DIASTOLIC BLOOD PRESSURE: 58 MMHG | TEMPERATURE: 97.8 F

## 2022-08-11 DIAGNOSIS — Z11.1 ENCOUNTER FOR SCREENING FOR RESPIRATORY TUBERCULOSIS: ICD-10-CM

## 2022-08-11 LAB
MAGNESIUM SERPL-MCNC: 1.9 MG/DL (ref 1.7–2.3)
POTASSIUM SERPL-SCNC: 4 MMOL/L (ref 3.4–5.3)

## 2022-08-11 PROCEDURE — 99239 HOSP IP/OBS DSCHRG MGMT >30: CPT | Performed by: INTERNAL MEDICINE

## 2022-08-11 PROCEDURE — 36415 COLL VENOUS BLD VENIPUNCTURE: CPT | Performed by: INTERNAL MEDICINE

## 2022-08-11 PROCEDURE — 250N000012 HC RX MED GY IP 250 OP 636 PS 637: Performed by: INTERNAL MEDICINE

## 2022-08-11 PROCEDURE — 84132 ASSAY OF SERUM POTASSIUM: CPT | Performed by: INTERNAL MEDICINE

## 2022-08-11 PROCEDURE — 250N000013 HC RX MED GY IP 250 OP 250 PS 637: Performed by: STUDENT IN AN ORGANIZED HEALTH CARE EDUCATION/TRAINING PROGRAM

## 2022-08-11 PROCEDURE — 250N000013 HC RX MED GY IP 250 OP 250 PS 637: Performed by: INTERNAL MEDICINE

## 2022-08-11 PROCEDURE — 83735 ASSAY OF MAGNESIUM: CPT | Performed by: INTERNAL MEDICINE

## 2022-08-11 RX ORDER — ACETAMINOPHEN 325 MG/1
650 TABLET ORAL EVERY 6 HOURS PRN
Status: ON HOLD | DISCHARGE
Start: 2022-08-11 | End: 2022-01-01

## 2022-08-11 RX ORDER — TIOTROPIUM BROMIDE 18 UG/1
18 CAPSULE ORAL; RESPIRATORY (INHALATION) DAILY
DISCHARGE
Start: 2022-08-11 | End: 2022-01-01

## 2022-08-11 RX ORDER — PREDNISONE 10 MG/1
TABLET ORAL
Qty: 17 TABLET | Refills: 0 | DISCHARGE
Start: 2022-08-11 | End: 2022-01-01

## 2022-08-11 RX ADMIN — LEVOTHYROXINE SODIUM 88 MCG: 0.09 TABLET ORAL at 09:40

## 2022-08-11 RX ADMIN — PREDNISONE 40 MG: 20 TABLET ORAL at 09:40

## 2022-08-11 RX ADMIN — LEVOFLOXACIN 250 MG: 250 TABLET, FILM COATED ORAL at 09:40

## 2022-08-11 RX ADMIN — FERROUS SULFATE TAB 325 MG (65 MG ELEMENTAL FE) 325 MG: 325 (65 FE) TAB at 09:40

## 2022-08-11 RX ADMIN — METOPROLOL SUCCINATE 25 MG: 25 TABLET, FILM COATED, EXTENDED RELEASE ORAL at 09:39

## 2022-08-11 RX ADMIN — ESCITALOPRAM 10 MG: 5 TABLET, FILM COATED ORAL at 09:40

## 2022-08-11 RX ADMIN — GUAIFENESIN 600 MG: 600 TABLET, EXTENDED RELEASE ORAL at 09:40

## 2022-08-11 RX ADMIN — FUROSEMIDE 20 MG: 20 TABLET ORAL at 09:40

## 2022-08-11 ASSESSMENT — ACTIVITIES OF DAILY LIVING (ADL)
ADLS_ACUITY_SCORE: 32

## 2022-08-11 NOTE — DISCHARGE SUMMARY
[facility]  Discharge Summary  Name: Marie Kline    MRN: [id number]  YOB: 1932    Age: 89 year old  Date of Discharge:  8/11/2022  Date of Admission: 8/6/2022  Primary Care Provider: Be Dumont  Discharge Physician:  Lokesh Slaughter M.D  Discharging Service:  Hospitalist      Discharge Diagnosis:  Marie Kline is a 89 year old female with past medical history significant for C OPD on chronic oxygen, coronary artery disease, CHF, CLL, pulmonary nodules, hypertension, CKD, hypothyroidism, and hyperlipidemia who presented to [facility] on 8/6/2022 with productive cough and increased shortness of breath and was found to have COPD exacerbation with likely infective bronchitis.     1.  Acute on chronic respiratory failure due to COPD Exacerbation  2. Cough and Dyspnea secondary to #1  History of COPD chronically on 2 L of oxygen at home.  Now with reported history of increased shortness of breath and productive cough.  Was not requiring any increased oxygen requirements to maintain saturations, but did complain of significant dyspnea. She was treated for COPD exacerbation and respiratory infection with fluoroquinolone, and completed the duration of therapy. She will be on bronchodilators and nebulizer. She was on methylprednisolone 40 mg IV every 12 hours, transitioned to  oral tapering dose of Prednisone.   Her breathing  Much improved and appears to be at baseline respiratory status, on 2 lpm oxygen chronically. But due to generalized weakness and deconditioning, she needed PT and OT service evaluated and recommended TCU on discharge     3.  Elevated Troponin: Troponin 60. ECG without ST dynamic changes. No symptoms.  4.  Sever Malnutrition in the context of chronic illness.  -BMI 16. Nutrition consult.  5.  CLL: .4 (baseline to 140s), better(88) on the day of discharge. She has follow up with  Dr. Ivory from [facility]. She also receives intermittent IVIG  infusions.  6.  Mild Hyponatremia: Na 130, likely due to poor oral intake. Stable.  7.  Chronic Anemia: Hgb at baseline ~8.6 on discharge.   8.  Chronic Thrombocytopenia: PLT 55; at baseline. No evidence of bleeding.  9.  Hypothyroidism: Continue PTA levothyroxine.  10.  Physical deconditioning: she was evaluated by PT/ OT and being discharged to TCU.    I discussed with patient the plan of discharge and follow up.     Discharge Disposition:  Discharged to short-term care facility     Allergies:  Allergies   Allergen Reactions     Diagnostic X-Ray Materials Hives     CT DYE     Contrast Dye Hives     CT DYE     Prolia [Denosumab]      Osteonecrosis jaw       Rocephin [Ceftriaxone] Itching     Wound Dressing Adhesive         Discharge Medications:   Current Discharge Medication List      START taking these medications    Details   acetaminophen (TYLENOL) 325 MG tablet Take 2 tablets (650 mg) by mouth every 6 hours as needed for mild pain or other (and adjunct with moderate or severe pain or per patient request)    Associated Diagnoses: Chronic shoulder pain, unspecified laterality      predniSONE (DELTASONE) 10 MG tablet 3 tabs PO daily for 2 days, then 2 tabs PO daily for 3 days, then 1 tab PO daily for 5 days and then stop  Qty: 17 tablet, Refills: 0    Associated Diagnoses: COPD with acute exacerbation (H)      tiotropium (SPIRIVA) 18 MCG inhaled capsule Inhale 1 capsule (18 mcg) into the lungs daily    Comments: Only when in TCU and will go back on her Revefenacin on discharge from the faciity  Associated Diagnoses: COPD with acute exacerbation (H)         CONTINUE these medications which have CHANGED    Details   Revefenacin 175 MCG/3ML SOLN Inhale 3 mLs (175 mcg) into the lungs daily  Qty: 90 mL, Refills: 11    Comments: HOLD while in TCU and resume at home. Will use Spiriva while in TCU  Associated Diagnoses: Chronic obstructive pulmonary disease, unspecified COPD type (H)         CONTINUE these medications  which have NOT CHANGED    Details   albuterol (PROAIR HFA/PROVENTIL HFA/VENTOLIN HFA) 108 (90 Base) MCG/ACT inhaler Inhale 2 puffs into the lungs every 6 hours  Qty: 1 each, Refills: 11    Comments: Pharmacy may dispense brand covered by insurance (Proair, or proventil or ventolin or generic albuterol inhaler)  Associated Diagnoses: Chronic obstructive pulmonary disease, unspecified COPD type (H); Chronic respiratory failure with hypoxia (H)      aspirin 81 MG EC tablet Take 81 mg by mouth every evening       budesonide (PULMICORT) 0.5 MG/2ML neb solution Take 2 mLs (0.5 mg) by nebulization 2 times daily  Qty: 120 mL, Refills: 11    Associated Diagnoses: Chronic obstructive pulmonary disease, unspecified COPD type (H)      escitalopram (LEXAPRO) 10 MG tablet Take 1 tablet (10 mg) by mouth daily  Qty: 30 tablet, Refills: 3    Associated Diagnoses: LESLY (generalized anxiety disorder)      ferrous sulfate (IRON) 325 (65 FE) MG tablet Take 325 mg by mouth daily (with breakfast)       furosemide (LASIX) 20 MG tablet Take 20 mg by mouth Take one tablet on Sunday, Monday, Wednesday, Thursday and Saturday      Immune Globulin, Human, (OCTAGAM) 5 GM/100ML SOLN into the vein every 6 weeks.    Hold this medication while in TCU-resume at discharge from TCU      levothyroxine (SYNTHROID/LEVOTHROID) 88 MCG tablet Take 1 tablet (88 mcg) by mouth daily  Qty: 90 tablet, Refills: 0    Associated Diagnoses: Acquired hypothyroidism      loperamide (IMODIUM) 2 MG capsule Take 1 capsule (2 mg) by mouth 3 times daily as needed for diarrhea  Qty: 60 capsule, Refills: 1    Associated Diagnoses: Diarrhea, unspecified type      metoprolol succinate ER (TOPROL XL) 25 MG 24 hr tablet Take 1 tablet (25 mg) by mouth daily  Qty: 90 tablet, Refills: 2    Associated Diagnoses: ACS (acute coronary syndrome) (H)      Multiple Vitamins-Minerals (MULTIVITAMIN ADULT) CHEW Take 2 chew tab by mouth daily      traZODone (DESYREL) 50 MG tablet Take 1 tablet  "(50 mg) by mouth At Bedtime  Qty: 90 tablet, Refills: 0    Associated Diagnoses: Sleep difficulties      ACE/ARB/ARNI NOT PRESCRIBED (INTENTIONAL) Please choose reason not prescribed from choices below.    Associated Diagnoses: Chronic congestive heart failure, unspecified heart failure type (H)         STOP taking these medications       levofloxacin (LEVAQUIN) 250 MG tablet Comments:   Reason for Stopping:         psyllium (METAMUCIL) 28.3 % POWD Comments:   Reason for Stopping:                Condition on Discharge:  Discharge condition: Stable   Discharge vitals: Blood pressure 125/58, pulse 65, temperature 97.8  F (36.6  C), temperature source Oral, resp. rate 16, height 1.499 m (4' 11\"), weight 40.3 kg (88 lb 12.8 oz), SpO2 100 %, not currently breastfeeding.   Code status on discharge: Full Code     History of Illness:  See detailed admission note for full details.    Significant Physical Exam Findings:  General: Very pleasant female resting comfortably in hospital bed.  Awake, alert, interactive, chronically sick looking..  HEENT: Normocephalic, atraumatic.  PERRL, EOMI.  Conjunctiva clear, sclerae anicteric.  Mucous membranes moist.  Cardiac: Regular rate and rhythm without murmur, gallop, or rub.  No peripheral edema.  Respiratory: Tight chest, scattered wheezing and prolonged expiratory phases.  GI: Normal, active bowel sounds.  Abdomen soft, nontender, nondistended.  : Deferred.  Musculoskeletal: Moving all extremities appropriately.  Skin: No rashes or abrasions on exposed skin.  Neurologic: Alert and oriented x4.  Cranial nerves II through XII grossly intact.  Psychologic: Appropriate mood and affect.       Procedures other than Imaging:  None     Imaging:  Results for orders placed or performed during the hospital encounter of 08/06/22   XR Chest 2 Views    Narrative    EXAM: XR CHEST 2 VIEWS  LOCATION: [facility]  DATE/TIME: 08/06/2022, 12:37 PM    INDICATION: Cough. " Shortness of breath.  COMPARISON: 03/16/2022.      Impression    IMPRESSION: No significant interval change. Tortuous calcified thoracic aorta. Mild hyperinflation both lungs. Mild scarring or linear atelectasis in the left midlung. Heart size is stable. Pulmonary vascularity is within normal limits. No pleural   effusions. No pneumothorax.       *Note: Due to a large number of results and/or encounters for the requested time period, some results have not been displayed. A complete set of results can be found in Results Review.        Consultations:  No consultations were requested during this admission.     Recent Lab Results:  Recent Labs   Lab 08/09/22 0623 08/07/22 0618 08/06/22  1221   WBC 88.3* 102.4* 75.3*   HGB 8.6* 9.1* 9.7*   HCT 29.0* 31.2* 33.0*   MCV 99 99 101*   PLT 55* 66* 69*     Recent Labs   Lab 08/11/22 0716 08/10/22  0623 08/09/22  0623 08/08/22  0810 08/07/22  0618 08/06/22  1742 08/06/22  1221   NA  --   --  130*  --  131*  --  135*   POTASSIUM 4.0 4.0 4.3 4.8 4.3  --  4.7   CHLORIDE  --   --  93*  --  94*  --  95*   CO2  --   --  32*  --  32*  --  33*   ANIONGAP  --   --  5*  --  5*  --  7   GLC  --   --  81  --  129*  --  88   BUN  --   --  20.3  --  18.2  --  9.3   CR  --   --  0.71 0.84 0.91   < > 0.96*   GFRESTIMATED  --   --  80 66 60*   < > 56*   CARLOS  --   --  8.2  --  8.6  --  8.5    < > = values in this interval not displayed.     Recent Labs   Lab 08/09/22 0623 08/07/22 0618 08/06/22  1221   GLC 81 129* 88          Pending Results:    Unresulted Labs Ordered in the Past 30 Days of this Admission     No orders found from 7/7/2022 to 8/7/2022.              Primary Care - Care Coordination Referral      General info for SNF    Length of Stay Estimate: Short Term Care: Estimated # of Days <30  Condition at Discharge: Improving  Level of care:skilled   Rehabilitation Potential: Fair  Admission H&P remains valid and up-to-date: Yes  Recent Chemotherapy: N/A  Use Nursing Home Standing  Orders: Yes     Mantoux instructions    Give two-step Mantoux (PPD) Per Facility Policy Yes     Follow Up and recommended labs and tests    Follow up with [facility] physician.  The following labs/tests are recommended: BMP, CBC in 5 days, result to TCU physician.     Reason for your hospital stay    COPD exacerbation, deconditioning, generalized weakness     Daily weights    Call Provider for weight gain of more than 2 pounds per day or 5 pounds per week.     Activity - Up with nursing assistance     Occupational Therapy Adult Consult    Evaluate and treat as clinically indicated.    Reason:  ADL, cognitive function evaluation     Physical Therapy Adult Consult    Evaluate and treat as clinically indicated.    Reason:  Weakness, deconditioning     Fall precautions     Oxygen Adult/Peds    I, the undersigned, certify that the above prescribed supplies are medically necessary for this patient and is both reasonable and necessary in reference to accepted standards of medical and necessary in reference to accepted standards of medical practice in the treatment of this patient's condition and is not prescribed as a convenience.      Diet    Follow this diet upon discharge: Orders Placed This Encounter      Snacks/Supplements Adult: Ensure Enlive; Between Meals      Combination Diet Regular Diet Adult       Hospital Course:  See above     Total time spent in face to face contact with the patient and coordinating discharge was: >30 Minutes.

## 2022-08-11 NOTE — PROGRESS NOTES
[facility] GERIATRICS    PRIMARY CARE PROVIDER AND CLINIC:  Be Dumont MD, [address] [place] [facility] [address]  Chief Complaint   Patient presents with     Hospital F/U      [place] [facility] Record Number:  [id number]  Place of Service where encounter took place:  [facility]  ([facility]) [[id number]]    Marie Kline  is a 89 year old  (4/28/1932), admitted to the above facility from  [facility]. Hospital stay 8/6/22 through 8/11/22..   HPI:    Past medical history significant for COPD on chronic oxygen, hypertension, coronary artery disease, HFrEF, NSTEMI, bladder cancer, chronic kidney disease, LESLY,  hypothyroidism, dyslipidemia, CLL    Summary of recent hospitalization:  Marie Kline was admitted at [facility] from 8/6-8/11/2022.  But he presented to the emergency department with shortness of breath, productive cough.  Had been started on Levaquin 2 days prior at her assisted living for pneumonia.  Vitals with oxygen in the high 80s on 2 L of oxygen.  EKG showed sinus rhythm with first-degree AV block with PACs.  Laboratory evaluation significant for creatinine 0.96, sodium 135, CO2 33, BNP 1808, WBC 75.3, hemoglobin 9.7, troponin high-sensitivity 60.  Chest x-ray shows tortuous calcified thoracic aorta, mild hyperinflation to both lungs, mild scarring or linear atelectasis in the left midlung, heart size is stable, pulmonary vascularity in normal limits, no pleural effusions, no pneumothorax.  Influenza a and B, RSV and SARS CoV2 negative.  She was admitted for COPD exacerbation with infective bronchitis and acute on chronic respiratory failure.  Of note she did receive ceftriaxone in the emergency department and developed an itch all over-concerning for allergic reaction.  She received a course of Levaquin, methylprednisolone.  With mild hyponatremia that persisted at discharge.  She was transitioned to oral prednisone and oxygen weaned to chronic 2 L  of oxygen prior to discharge. Her respiratory status improved. PTA revefanacin on hold in TCU and was transitioned to spiriva for TCU stay. Discharged to TCU for physical rehabilitation and medical management.     Marie was seen today for admission visit at the TCU.  She is oriented x3 today.  She is resting in bed at the time of my visit today.  She tells me her breathing is okay, but it is not back to baseline yet.  She still is a little more short of breath than normal.  She is also on 3 L of oxygen this morning, and normally uses 2 L of oxygen.  She does cough occasionally throughout the visit and reports she does have a productive cough.  She chronically has occasional dizziness/lightheadedness with position changes.  She had a bout of diarrhea that had been ongoing prior to this hospitalization, and reports she has no longer had diarrhea since admission to the hospital.  She tells me her bowels are moving well.  She tells me her appetite is okay, though she does need to gain some weight.  She denies dysuria/trouble urinating.  She lives in an assisted living.  We discussed what to expect while in the TCU today.      CODE STATUS/ADVANCE DIRECTIVES DISCUSSION:  Prior  DNR / DNI  ALLERGIES:   Allergies   Allergen Reactions     Diagnostic X-Ray Materials Hives     CT DYE     Contrast Dye Hives     CT DYE     Prolia [Denosumab]      Osteonecrosis jaw       Rocephin [Ceftriaxone] Itching     Wound Dressing Adhesive       PAST MEDICAL HISTORY:   Past Medical History:   Diagnosis Date     Arthritis     Generalized     Bladder cancer (H)      Bladder cancer (H)      Cardiomyopathy (H)      CLL (chronic lymphocytic leukemia) (H)      Congestive heart failure (H) 10/24/2014    flash pulm edema ass w/Takotsubo     COPD (chronic obstructive pulmonary disease) (H)     noc O2     Hypertension      Hypothyroid      Mumps      Myocardial infarction (H) 10/2014    Takotsubo presentation w/mild CAD     Pulmonary edema cardiac  cause (H) 10/08/2014    associated w/Takotsubo ACS     Tricuspid valve disorders, 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    TR 2-3+ per echo      PAST SURGICAL HISTORY:   has a past surgical history that includes Coronary Angiography Adult Order (10/2014); Cystoscopy, biopsy bladder, combined (N/A, 2/9/2016); Cystoscopy, transurethral resection (TUR) tumor bladder, combined (N/A, 2/9/2016); Esophagoscopy, gastroscopy, duodenoscopy (EGD), combined (N/A, 6/22/2016); Cystoscopy; Bladder surgery; Biopsy Lymph Node Inguinal (Right, 10/23/2018); Open reduction internal fixation hip bipolar (Left, 11/19/2018); and Left Heart Catheterization (N/A, 12/11/2018).  FAMILY HISTORY: family history includes Cancer in her father; Cerebrovascular Disease in her mother.  SOCIAL HISTORY:   reports that she quit smoking about 26 years ago. Her smoking use included cigarettes. She has never used smokeless tobacco. She reports that she does not drink alcohol and does not use drugs.  Patient's living condition: lives in an assisted living facility    Post Discharge Medication Reconciliation Status:   Post Medication Reconciliation Status: Discharge medications reconciled and changed, see notes/orders       Current Outpatient Medications   Medication Sig     ACE/ARB/ARNI NOT PRESCRIBED (INTENTIONAL) Please choose reason not prescribed from choices below.     acetaminophen (TYLENOL) 325 MG tablet Take 2 tablets (650 mg) by mouth every 6 hours as needed for mild pain or other (and adjunct with moderate or severe pain or per patient request)     albuterol (PROAIR HFA/PROVENTIL HFA/VENTOLIN HFA) 108 (90 Base) MCG/ACT inhaler Inhale 2 puffs into the lungs every 6 hours (Patient taking differently: Inhale 2 puffs into the lungs every 6 hours as needed)     aspirin 81 MG EC tablet Take 81 mg by mouth every evening      budesonide (PULMICORT) 0.5 MG/2ML neb solution Take 2 mLs (0.5 mg) by nebulization 2 times daily     escitalopram (LEXAPRO) 10 MG  "tablet Take 1 tablet (10 mg) by mouth daily     ferrous sulfate (IRON) 325 (65 FE) MG tablet Take 325 mg by mouth daily (with breakfast)      furosemide (LASIX) 20 MG tablet Take 20 mg by mouth Take one tablet on Sunday, Monday, Wednesday, Thursday and Saturday     levothyroxine (SYNTHROID/LEVOTHROID) 88 MCG tablet Take 1 tablet (88 mcg) by mouth daily     metoprolol succinate ER (TOPROL XL) 25 MG 24 hr tablet Take 1 tablet (25 mg) by mouth daily     Multiple Vitamins-Minerals (MULTIVITAMIN ADULT) CHEW Take 2 chew tab by mouth daily     predniSONE (DELTASONE) 10 MG tablet 3 tabs PO daily for 2 days, then 2 tabs PO daily for 3 days, then 1 tab PO daily for 5 days and then stop     tiotropium (SPIRIVA) 18 MCG inhaled capsule Inhale 1 capsule (18 mcg) into the lungs daily     traZODone (DESYREL) 50 MG tablet Take 1 tablet (50 mg) by mouth At Bedtime     Immune Globulin, Human, (OCTAGAM) 5 GM/100ML SOLN into the vein every 6 weeks.    Hold this medication while in TCU-resume at discharge from TCU     Revefenacin 175 MCG/3ML SOLN Inhale 3 mLs (175 mcg) into the lungs daily     No current facility-administered medications for this visit.       ROS:  10 point ROS of systems including Constitutional, Eyes, Respiratory, Cardiovascular, Gastroenterology, Genitourinary, Integumentary, Musculoskeletal, Psychiatric were all negative except for pertinent positives noted in my HPI.    Vitals:  /68   Pulse 58   Temp 97.8  F (36.6  C)   Resp 18   Ht 1.499 m (4' 11\")   Wt 40 kg (88 lb 1.6 oz)   LMP  (LMP Unknown)   SpO2 93%   BMI 17.79 kg/m    Exam:  GENERAL APPEARANCE:  Alert, very frail, in NAD  HEENT: normocephalic, moist mucous membranes, nose without drainage or crusting, edentulous  RESP:  respiratory effort normal, no respiratory distress, Lung sounds diminished bilaterally, patient is on 3L via NC  CV: auscultation of heart done, rate and rhythm regular.   ABDOMEN: + bowel sounds, soft, nontender, no grimacing " or guarding with palpation.  M/S: no lower extremity edema  SKIN:  Inspection and palpation of skin and subcutaneous tissue: skin warm, dry without rashes  NEURO: cranial nerves 2-12 grossly intact and at patient's baseline; moves extremities freely  PSYCH: oriented x 3, affect and mood ok      Lab/Diagnostic data:  [facility] Please refer to them using EPIC/Care Everywhere. and Recent labs in [facility] reviewed by me today.     ASSESSMENT/PLAN:  (J44.1) COPD exacerbation (H)  (primary encounter diagnosis)  (J96.21,  J96.22) Acute on chronic respiratory failure with hypoxia and hypercapnia (H)  Comment: with acute COPD exacerbation with infective bronchitis, and increased oxygen needs  -received levaquin and methylprednisolone inpatient- transitioned to oral steroids at discharge  -PTA revefanacin on hold in TCU and was transitioned to spiriva for TCU stay  -breathing is not back to baseline today, she continues to report increased shortness of breath and productive cough, is also requiring 3 L of oxygen today, which is above PTA oxygen use  Plan: Continue spiriva daily, budesonide BID, albuterol PRN. Hold revefanacin in the TCU and resume at discharge (and stop spiriva). Continue prednisone taper as ordered. monitor respiratory status closely. Follow up with pulmonary per their recommendations.     (R77.8) Elevated troponin  Comment: troponin elevated to 60 on 8/6  -suspect demand ischemia due to exacerbation  -denies CP today  Plan: Monitor    (E87.1) Hyponatremia  Comment: mild, suspected to be due to poor oral intake  -sodium 130 on 8/9/2022  Plan: BMP 8/15.     (C91.10) CLL (chronic lymphocytic leukemia) (H)  Comment: chronic  Plan: CBC 8/15. IVIG on hold while in TCU. Follow up with oncology per recommendations    (I25.10) Coronary artery disease involving native coronary artery of native heart, unspecified whether angina present  (I42.8) Nonischemic cardiomyopathy (H)  Comment: chronic,  with history of NSTEMI  -most recent ECHO 3/2021 with LVEF 60-65%, previously in 2018 ECHO with LVE F 30-35% with takotsubo cardiomyopathy  -appears euvolemic today, denies CP  Plan: Continue baby aspirin daily, Lasix 20 mg daily 5 days/week, metoprolol ER 25 mg daily.  BMP 8/15.  Monitor fluid status. VS per policy. Adjust medications as needed.    (D64.9) Chronic anemia  Comment: chronic  -baseline 9-10 recently  -hemoglobin 8.6 on 8/9/2022  Plan: CBC 8/15.  Continue ferrous sulfate 325 mg p.o. daily.    (D69.6) Thrombocytopenia (H)  Comment: Chronic  -Baseline platelet  recently  -platelet 55 on 8/9  Plan: CBC 8/15. Monitor for s/sx of bleeding.     (K52.9) Chronic diarrhea-resolved  Comment: Reported that her chronic diarrhea resolved when she was admitted to the hospital, bowels moving normally now  Plan: Discontinue Imodium.  If develops diarrhea while in the TCU should rule out C. Difficile, since patient received antibiotics inpatient, prior to restarting Imodium.  Monitor stools.    (F41.9) Anxiety  (G47.00) Insomnia, unspecified type  Comment: chronic, mood today is ok  Plan: Continue escitalopram 10 mg p.o. daily, trazodone 50 mg at bedtime.  Monitor mood and symptoms.  Monitor sleep.  Encourage good sleep hygiene.  Consider ACP referral if needed.    (E03.9) Hypothyroidism, unspecified type  Comment: chronic  -tsh 12.93 on 5/16/2022  Plan: Continue levothyroxine 88 mcg daily.     (R53.81) Physical deconditioning  Comment: Acute, secondary to recent hospitalization, medical conditions as above  Plan: Encourage participation in physical therapy/occupational therapy for strengthening and deconditioning. Discharge planning per their recommendation. Social work to assist with d/c planning.        Total time spent with patient visit at the skilled nursing facility was 36 minutes including patient visit and review of past records. Greater than 50% of total time spent with counseling and coordinating care  with facility staff regarding admission and medication orders, plan of care as described above. Counseling patient about current medications, plan of care and what to expect at TCU.  .     Electronically signed by:  NOHEMI Stone CNP

## 2022-08-11 NOTE — PLAN OF CARE
"Goal Outcome Evaluation:  Vitals:/70 (BP Location: Right arm)   Pulse 75   Temp 98.6  F (37  C) (Oral)   Resp 18   Ht 1.499 m (4' 11\")   Wt 40.3 kg (88 lb 12.8 oz)   LMP  (LMP Unknown)   SpO2 97%   BMI 17.94 kg/m      Pain: denies  Neuro: A&O  Respiratory: infrequent cough. LS diminished. 3L NC.   LDAs: No IV access, ok per MD.   Labs: k and mag recheck in am .   Diet: reg  Activity: SBA with walker and gb  Plan: discharge to [facility] tomorrow.                       "

## 2022-08-11 NOTE — PROGRESS NOTES
Clinic Care Coordination Contact  Care Team Conversations    Situation: RN Clinical Product Navigator following patient through TCU care progression.     Background: patient recently admitted at [facility] due to productive cough and increased shortness of breath and was found to have COPD exacerbation with likely infective bronchitis.    Assessment: on chronic O2 at baseline, also noted to have severe malnutrition with BMI of 16,     Plan/Recommendations: RN Clinical Product Navigator will send TCU Care Team Care Management hand in communication and request involvement in discharge planning and coordination of care.       Paola Borja RN  Clinical Product Navigator

## 2022-08-11 NOTE — PLAN OF CARE
"Goal Outcome Evaluation:    Plan of Care Reviewed With: patient     Overall Patient Progress: improving    Patient up in chair x2. Mild dyspnea after activity, pt states \"getting better\". Ambulated to BR with assist of 2-3 lpm/NC. Plan to discharge to TCU with family transport. Family will have oxygen tank for patient at transport. Plan to discharge at 1330.     Addendum: Pt discharged to TCU at 1355. Transported via wheelchair. Daughter given a copy of the AVS.          "

## 2022-08-11 NOTE — PLAN OF CARE
Physical Therapy Discharge Summary    Reason for therapy discharge:    Discharged to transitional care facility.    Progress towards therapy goal(s). See goals on Care Plan in [facility] electronic health record for goal details.  Goals partially met.  Barriers to achieving goals:   discharge from facility.

## 2022-08-11 NOTE — PROGRESS NOTES
Care Management Discharge Note    Discharge Date: 08/11/2022       Discharge Disposition: Assisted Living    Discharge Services:  therapy    Discharge DME:  [employer]- MD said  he cannot order while going to TCU, TCU or Primary Care Provider will need to order at time of discharge from TCU    Discharge Transportation: family or friend will provide    Private pay costs discussed: Not applicable    PAS Confirmation Code:  [id number]   Patient/family educated on Medicare website which has current facility and service quality ratings:  yes    Education Provided on the Discharge Plan:  yes  Persons Notified of Discharge Plans: dtg, nurse, facility  Patient/Family in Agreement with the Plan: yes    Handoff Referral Completed: No    Additional Information:  FANI spoke with pt dtg Margaret who will be at the hospital around 1330 to transport pt to TCU.  FANI spoke with Yarely at [facility] and informed her of discharge time and faxed orders.  Bedside nurse informed on plan.    Anna Sierra RN, BSN, PHN, CCM  Care Coordinator  [facility]  [phone]

## 2022-08-11 NOTE — PLAN OF CARE
"Goal Outcome Evaluation:              Alert and oriented, forgetful at times, standby assist, walker and gait belt. Pt voiding without difficulty. Lungs sound clear, on her home oxygen at 3 liters. Np C/o pain or SOB. Possible discharge to TCU today.     BP (!) 140/56 (BP Location: Right arm)   Pulse 53   Temp 97.5  F (36.4  C) (Oral)   Resp 16   Ht 1.499 m (4' 11\")   Wt 40.3 kg (88 lb 12.8 oz)   LMP  (LMP Unknown)   SpO2 100%   BMI 17.94 kg/m                  "

## 2022-08-12 ENCOUNTER — PATIENT OUTREACH (OUTPATIENT)
Dept: CARE COORDINATION | Facility: CLINIC | Age: 87
End: 2022-08-12

## 2022-08-12 ENCOUNTER — TRANSITIONAL CARE UNIT VISIT (OUTPATIENT)
Dept: GERIATRICS | Facility: CLINIC | Age: 87
End: 2022-08-12
Payer: MEDICARE

## 2022-08-12 VITALS
OXYGEN SATURATION: 93 % | BODY MASS INDEX: 17.76 KG/M2 | DIASTOLIC BLOOD PRESSURE: 68 MMHG | HEIGHT: 59 IN | TEMPERATURE: 97.8 F | SYSTOLIC BLOOD PRESSURE: 116 MMHG | WEIGHT: 88.1 LBS | HEART RATE: 58 BPM | RESPIRATION RATE: 18 BRPM

## 2022-08-12 DIAGNOSIS — I25.10 CORONARY ARTERY DISEASE INVOLVING NATIVE CORONARY ARTERY OF NATIVE HEART, UNSPECIFIED WHETHER ANGINA PRESENT: ICD-10-CM

## 2022-08-12 DIAGNOSIS — J96.22 ACUTE ON CHRONIC RESPIRATORY FAILURE WITH HYPOXIA AND HYPERCAPNIA (H): ICD-10-CM

## 2022-08-12 DIAGNOSIS — R79.89 ELEVATED TROPONIN: ICD-10-CM

## 2022-08-12 DIAGNOSIS — C91.10 CLL (CHRONIC LYMPHOCYTIC LEUKEMIA) (H): ICD-10-CM

## 2022-08-12 DIAGNOSIS — E87.1 HYPONATREMIA: ICD-10-CM

## 2022-08-12 DIAGNOSIS — K52.9 CHRONIC DIARRHEA: ICD-10-CM

## 2022-08-12 DIAGNOSIS — D64.9 CHRONIC ANEMIA: ICD-10-CM

## 2022-08-12 DIAGNOSIS — F41.9 ANXIETY: ICD-10-CM

## 2022-08-12 DIAGNOSIS — J44.1 COPD EXACERBATION (H): Primary | ICD-10-CM

## 2022-08-12 DIAGNOSIS — D69.6 THROMBOCYTOPENIA (H): ICD-10-CM

## 2022-08-12 DIAGNOSIS — E03.9 HYPOTHYROIDISM, UNSPECIFIED TYPE: ICD-10-CM

## 2022-08-12 DIAGNOSIS — G47.00 INSOMNIA, UNSPECIFIED TYPE: ICD-10-CM

## 2022-08-12 DIAGNOSIS — J96.21 ACUTE ON CHRONIC RESPIRATORY FAILURE WITH HYPOXIA AND HYPERCAPNIA (H): ICD-10-CM

## 2022-08-12 DIAGNOSIS — R53.81 PHYSICAL DECONDITIONING: ICD-10-CM

## 2022-08-12 DIAGNOSIS — I42.8 NONISCHEMIC CARDIOMYOPATHY (H): ICD-10-CM

## 2022-08-12 PROCEDURE — 99310 SBSQ NF CARE HIGH MDM 45: CPT | Performed by: NURSE PRACTITIONER

## 2022-08-12 PROCEDURE — 86481 TB AG RESPONSE T-CELL SUSP: CPT | Performed by: NURSE PRACTITIONER

## 2022-08-12 PROCEDURE — P9604 ONE-WAY ALLOW PRORATED TRIP: HCPCS | Performed by: NURSE PRACTITIONER

## 2022-08-12 NOTE — PATIENT INSTRUCTIONS
Naima Kline  4/28/1932  1) Discontinue imodium   2) CBC, BMP 8/15. Diagnosis: cardiomyopathy, anemia  Usha Aragon, APRN CNP on 8/12/2022 at 10:51 AM

## 2022-08-12 NOTE — PROGRESS NOTES
Clinic Care Coordination Contact  Care Coordination Transition Communication         Clinical Data: Patient was hospitalized at [facility] from 8/6 to 8/11 with diagnosis of COPD exacerbation with likely infective bronchitis    Transition to Facility:              Facility Name: [facility]              Contact name and phone number/fax: [phone]    MALKA Sauceda graciously completed TCU hand communication on 8/9/22.      Plan: Lead SW Care Coordinator will await notification from facility staff informing Lead Care Coordinator of patient's discharge plans/needs. Lead Care Coordinator will review chart and outreach to facility staff every 4 weeks and as needed.     Yuliana Hahn, BSN, RN (assisting in coverage 8/12/22)  [facility]  - Ambulatory Care Management

## 2022-08-12 NOTE — LETTER
8/12/2022        RE: Marie Kline  [address]  [facility] [address]        [facility] GERIATRICS    PRIMARY CARE PROVIDER AND CLINIC:  Be Dumont MD, [address] / [facility] [address]  Chief Complaint   Patient presents with     Hospital F/U      [facility] Medical Record Number:  [id number]  Place of Service where encounter took place:  [facility]  ([facility]) [[id number]]    Marie Kline  is a 89 year old  (4/28/1932), admitted to the above facility from  [facility]. Hospital stay 8/6/22 through 8/11/22..   HPI:    Past medical history significant for COPD on chronic oxygen, hypertension, coronary artery disease, HFrEF, NSTEMI, bladder cancer, chronic kidney disease, LESLY,  hypothyroidism, dyslipidemia, CLL    Summary of recent hospitalization:  Marie Kline was admitted at [facility] from 8/6-8/11/2022.  But he presented to the emergency department with shortness of breath, productive cough.  Had been started on Levaquin 2 days prior at her assisted living for pneumonia.  Vitals with oxygen in the high 80s on 2 L of oxygen.  EKG showed sinus rhythm with first-degree AV block with PACs.  Laboratory evaluation significant for creatinine 0.96, sodium 135, CO2 33, BNP 1808, WBC 75.3, hemoglobin 9.7, troponin high-sensitivity 60.  Chest x-ray shows tortuous calcified thoracic aorta, mild hyperinflation to both lungs, mild scarring or linear atelectasis in the left midlung, heart size is stable, pulmonary vascularity in normal limits, no pleural effusions, no pneumothorax.  Influenza a and B, RSV and SARS CoV2 negative.  She was admitted for COPD exacerbation with infective bronchitis and acute on chronic respiratory failure.  Of note she did receive ceftriaxone in the emergency department and developed an itch all over-concerning for allergic reaction.  She received a course of Levaquin, methylprednisolone.  With mild hyponatremia  that persisted at discharge.  She was transitioned to oral prednisone and oxygen weaned to chronic 2 L of oxygen prior to discharge. Her respiratory status improved. LYNN revefanacin on hold in TCU and was transitioned to spiriva for TCU stay. Discharged to TCU for physical rehabilitation and medical management.     Marie was seen today for admission visit at the TCU.  She is oriented x3 today.  She is resting in bed at the time of my visit today.  She tells me her breathing is okay, but it is not back to baseline yet.  She still is a little more short of breath than normal.  She is also on 3 L of oxygen this morning, and normally uses 2 L of oxygen.  She does cough occasionally throughout the visit and reports she does have a productive cough.  She chronically has occasional dizziness/lightheadedness with position changes.  She had a bout of diarrhea that had been ongoing prior to this hospitalization, and reports she has no longer had diarrhea since admission to the hospital.  She tells me her bowels are moving well.  She tells me her appetite is okay, though she does need to gain some weight.  She denies dysuria/trouble urinating.  She lives in an assisted living.  We discussed what to expect while in the TCU today.      CODE STATUS/ADVANCE DIRECTIVES DISCUSSION:  Prior  DNR / DNI  ALLERGIES:   Allergies   Allergen Reactions     Diagnostic X-Ray Materials Hives     CT DYE     Contrast Dye Hives     CT DYE     Prolia [Denosumab]      Osteonecrosis jaw       Rocephin [Ceftriaxone] Itching     Wound Dressing Adhesive       PAST MEDICAL HISTORY:   Past Medical History:   Diagnosis Date     Arthritis     Generalized     Bladder cancer (H)      Bladder cancer (H)      Cardiomyopathy (H)      CLL (chronic lymphocytic leukemia) (H)      Congestive heart failure (H) 10/24/2014    flash pulm edema ass w/Takotsubo     COPD (chronic obstructive pulmonary disease) (H)     noc O2     Hypertension      Hypothyroid      Mumps       Myocardial infarction (H) 10/2014    Takotsubo presentation w/mild CAD     Pulmonary edema cardiac cause (H) 10/08/2014    associated w/Takotsubo ACS     Tricuspid valve disorders, 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    TR 2-3+ per echo      PAST SURGICAL HISTORY:   has a past surgical history that includes Coronary Angiography Adult Order (10/2014); Cystoscopy, biopsy bladder, combined (N/A, 2/9/2016); Cystoscopy, transurethral resection (TUR) tumor bladder, combined (N/A, 2/9/2016); Esophagoscopy, gastroscopy, duodenoscopy (EGD), combined (N/A, 6/22/2016); Cystoscopy; Bladder surgery; Biopsy Lymph Node Inguinal (Right, 10/23/2018); Open reduction internal fixation hip bipolar (Left, 11/19/2018); and Left Heart Catheterization (N/A, 12/11/2018).  FAMILY HISTORY: family history includes Cancer in her father; Cerebrovascular Disease in her mother.  SOCIAL HISTORY:   reports that she quit smoking about 26 years ago. Her smoking use included cigarettes. She has never used smokeless tobacco. She reports that she does not drink alcohol and does not use drugs.  Patient's living condition: lives in an assisted living facility    Post Discharge Medication Reconciliation Status:   Post Medication Reconciliation Status: Discharge medications reconciled and changed, see notes/orders       Current Outpatient Medications   Medication Sig     ACE/ARB/ARNI NOT PRESCRIBED (INTENTIONAL) Please choose reason not prescribed from choices below.     acetaminophen (TYLENOL) 325 MG tablet Take 2 tablets (650 mg) by mouth every 6 hours as needed for mild pain or other (and adjunct with moderate or severe pain or per patient request)     albuterol (PROAIR HFA/PROVENTIL HFA/VENTOLIN HFA) 108 (90 Base) MCG/ACT inhaler Inhale 2 puffs into the lungs every 6 hours (Patient taking differently: Inhale 2 puffs into the lungs every 6 hours as needed)     aspirin 81 MG EC tablet Take 81 mg by mouth every evening      budesonide (PULMICORT) 0.5  "MG/2ML neb solution Take 2 mLs (0.5 mg) by nebulization 2 times daily     escitalopram (LEXAPRO) 10 MG tablet Take 1 tablet (10 mg) by mouth daily     ferrous sulfate (IRON) 325 (65 FE) MG tablet Take 325 mg by mouth daily (with breakfast)      furosemide (LASIX) 20 MG tablet Take 20 mg by mouth Take one tablet on Sunday, Monday, Wednesday, Thursday and Saturday     levothyroxine (SYNTHROID/LEVOTHROID) 88 MCG tablet Take 1 tablet (88 mcg) by mouth daily     metoprolol succinate ER (TOPROL XL) 25 MG 24 hr tablet Take 1 tablet (25 mg) by mouth daily     Multiple Vitamins-Minerals (MULTIVITAMIN ADULT) CHEW Take 2 chew tab by mouth daily     predniSONE (DELTASONE) 10 MG tablet 3 tabs PO daily for 2 days, then 2 tabs PO daily for 3 days, then 1 tab PO daily for 5 days and then stop     tiotropium (SPIRIVA) 18 MCG inhaled capsule Inhale 1 capsule (18 mcg) into the lungs daily     traZODone (DESYREL) 50 MG tablet Take 1 tablet (50 mg) by mouth At Bedtime     Immune Globulin, Human, (OCTAGAM) 5 GM/100ML SOLN into the vein every 6 weeks.    Hold this medication while in TCU-resume at discharge from TCU     Revefenacin 175 MCG/3ML SOLN Inhale 3 mLs (175 mcg) into the lungs daily     No current facility-administered medications for this visit.       ROS:  10 point ROS of systems including Constitutional, Eyes, Respiratory, Cardiovascular, Gastroenterology, Genitourinary, Integumentary, Musculoskeletal, Psychiatric were all negative except for pertinent positives noted in my HPI.    Vitals:  /68   Pulse 58   Temp 97.8  F (36.6  C)   Resp 18   Ht 1.499 m (4' 11\")   Wt 40 kg (88 lb 1.6 oz)   LMP  (LMP Unknown)   SpO2 93%   BMI 17.79 kg/m    Exam:  GENERAL APPEARANCE:  Alert, very frail, in NAD  HEENT: normocephalic, moist mucous membranes, nose without drainage or crusting, edentulous  RESP:  respiratory effort normal, no respiratory distress, Lung sounds diminished bilaterally, patient is on 3L via NC  CV: " auscultation of heart done, rate and rhythm regular.   ABDOMEN: + bowel sounds, soft, nontender, no grimacing or guarding with palpation.  M/S: no lower extremity edema  SKIN:  Inspection and palpation of skin and subcutaneous tissue: skin warm, dry without rashes  NEURO: cranial nerves 2-12 grossly intact and at patient's baseline; moves extremities freely  PSYCH: oriented x 3, affect and mood ok      Lab/Diagnostic data:  Labs done in SNF are in [facility]. Please refer to them using [facility] Everywhere. and Recent labs in [facility] reviewed by me today.     ASSESSMENT/PLAN:  (J44.1) COPD exacerbation (H)  (primary encounter diagnosis)  (J96.21,  J96.22) Acute on chronic respiratory failure with hypoxia and hypercapnia (H)  Comment: with acute COPD exacerbation with infective bronchitis, and increased oxygen needs  -received levaquin and methylprednisolone inpatient- transitioned to oral steroids at discharge  -PTA revefanacin on hold in TCU and was transitioned to spiriva for TCU stay  -breathing is not back to baseline today, she continues to report increased shortness of breath and productive cough, is also requiring 3 L of oxygen today, which is above PTA oxygen use  Plan: Continue spiriva daily, budesonide BID, albuterol PRN. Hold revefanacin in the TCU and resume at discharge (and stop spiriva). Continue prednisone taper as ordered. monitor respiratory status closely. Follow up with pulmonary per their recommendations.     (R77.8) Elevated troponin  Comment: troponin elevated to 60 on 8/6  -suspect demand ischemia due to exacerbation  -denies CP today  Plan: Monitor    (E87.1) Hyponatremia  Comment: mild, suspected to be due to poor oral intake  -sodium 130 on 8/9/2022  Plan: BMP 8/15.     (C91.10) CLL (chronic lymphocytic leukemia) (H)  Comment: chronic  Plan: CBC 8/15. IVIG on hold while in TCU. Follow up with oncology per recommendations    (I25.10) Coronary artery disease involving native coronary artery  of native heart, unspecified whether angina present  (I42.8) Nonischemic cardiomyopathy (H)  Comment: chronic, with history of NSTEMI  -most recent ECHO 3/2021 with LVEF 60-65%, previously in 2018 ECHO with LVE F 30-35% with takotsubo cardiomyopathy  -appears euvolemic today, denies CP  Plan: Continue baby aspirin daily, Lasix 20 mg daily 5 days/week, metoprolol ER 25 mg daily.  BMP 8/15.  Monitor fluid status. VS per policy. Adjust medications as needed.    (D64.9) Chronic anemia  Comment: chronic  -baseline 9-10 recently  -hemoglobin 8.6 on 8/9/2022  Plan: CBC 8/15.  Continue ferrous sulfate 325 mg p.o. daily.    (D69.6) Thrombocytopenia (H)  Comment: Chronic  -Baseline platelet  recently  -platelet 55 on 8/9  Plan: CBC 8/15. Monitor for s/sx of bleeding.     (K52.9) Chronic diarrhea-resolved  Comment: Reported that her chronic diarrhea resolved when she was admitted to the hospital, bowels moving normally now  Plan: Discontinue Imodium.  If develops diarrhea while in the TCU should rule out C. Difficile, since patient received antibiotics inpatient, prior to restarting Imodium.  Monitor stools.    (F41.9) Anxiety  (G47.00) Insomnia, unspecified type  Comment: chronic, mood today is ok  Plan: Continue escitalopram 10 mg p.o. daily, trazodone 50 mg at bedtime.  Monitor mood and symptoms.  Monitor sleep.  Encourage good sleep hygiene.  Consider ACP referral if needed.    (E03.9) Hypothyroidism, unspecified type  Comment: chronic  -tsh 12.93 on 5/16/2022  Plan: Continue levothyroxine 88 mcg daily.     (R53.81) Physical deconditioning  Comment: Acute, secondary to recent hospitalization, medical conditions as above  Plan: Encourage participation in physical therapy/occupational therapy for strengthening and deconditioning. Discharge planning per their recommendation. Social work to assist with d/c planning.        Total time spent with patient visit at the skilled nursing facility was 36 minutes including  patient visit and review of past records. Greater than 50% of total time spent with counseling and coordinating care with facility staff regarding admission and medication orders, plan of care as described above. Counseling patient about current medications, plan of care and what to expect at TCU.  .     Electronically signed by:  NOHEMI Stone CNP                       Sincerely,        NOHEMI Stone CNP

## 2022-08-13 LAB
QUANTIFERON MITOGEN: 10 IU/ML
QUANTIFERON NIL TUBE: 0.07 IU/ML
QUANTIFERON TB1 TUBE: 0.1 IU/ML
QUANTIFERON TB2 TUBE: 0.11

## 2022-08-15 NOTE — PROGRESS NOTES
[facility]  INITIAL VISIT NOTE  August 16, 2022    PRIMARY CARE PROVIDER AND CLINIC:  Be Dumont [address] [facility] / [facility] [address]    CHIEF COMPLAINT:  Hospital follow-up/Initial visit    HPI:    Marie Kline is a 89 year old  (4/28/1932) female who [facility] for an initial visit.     Medical history is notable for oxygen dependent COPD, hypertension, stress cardiomyopathy, hypothyroidism, bladder cancer, CLL, chronic anemia, COVID-19 pneumonia, anxiety, and osteoarthritis.    Summary of hospital course:  Patient was hospitalized at [facility] from August 6 through August 11, 2022 for acute COPD exacerbation.  Patient originally presented with increased shortness of breath and productive cough.  She had been recently started on levofloxacin at her assisted living for pneumonia 2 days prior to admission.  EKG showed normal sinus rhythm.  Chest x-ray was remarkable for mild pulmonary hyperinflation and mild scarring or linear atelectasis in the left midlung.  CBC demonstrated white count of 75.3, hemoglobin of 9.7, and platelet count of 69,000.  BNP was elevated at 1808.  Troponin I was 60.  BMP was significant for sodium level of 135.  Screening for COVID-19, influenza A, and influenza B was negative.  She did receive ceftriaxone in the emergency department and developed generalized pruritus concerning for allergic reaction.  She was treated with a course of levofloxacin, nebulizers, and IV methylprednisolone for acute COPD exacerbation.  Here respiratory status improved.  TCU was recommended per therapies.  She was discharged on prednisone taper.    Patient is admitted to this facility for medical management, nursing care, and rehab.     Of note, history was obtained from patient, facility RN, and extensive review of the chart.    Today's visit:  Patient was seen in her room, while lying in bed.  She appears frail but in no  acute distress.  She is currently on oxygen at 2 L/min.  She does endorse intermittent wheezing.  She denies fever, chills, cough, sputum, dyspnea at rest, nausea, vomiting, abdominal pain, or urinary symptoms.  She reports that she had a bowel movement yesterday.      CODE STATUS:   DNR / DNI    PAST MEDICAL HISTORY:   COPD, oxygen dependent, on O2 at 2 L/min continuously  Hypertension  Stress cardiomyopathy, LVEF 30-35% in December 2018, now recovered, last LVEF 60-65% in March 2021; no significant CAD per angiogram in December 2018  Mild mitral and tricuspid regurgitation  Hypothyroidism  Bladder cancer, s/p TURBT  CLL; on IVIG since 2004; she follows with Dr. Ivory at [facility]  Chronic anemia, baseline Hgb 9-11  Thrombocytopenia  COVID-19 pneumonia in September 2021  Anxiety  Osteoarthritis  Severe malnutrition    Past Medical History:   Diagnosis Date     Arthritis     Generalized     Bladder cancer (H)      Bladder cancer (H)      Cardiomyopathy (H)      CLL (chronic lymphocytic leukemia) (H)      Congestive heart failure (H) 10/24/2014    flash pulm edema ass w/Takotsubo     COPD (chronic obstructive pulmonary disease) (H)     noc O2     Hypertension      Hypothyroid      Mumps      Myocardial infarction (H) 10/2014    Takotsubo presentation w/mild CAD     Pulmonary edema cardiac cause (H) 10/08/2014    associated w/Takotsubo ACS     Tricuspid valve disorders, 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    TR 2-3+ per echo       PAST SURGICAL HISTORY:   Past Surgical History:   Procedure Laterality Date     BIOPSY LYMPH NODE INGUINAL Right 10/23/2018    Procedure: excsional biopsy right inguinal lymph node;  Surgeon: Reji Connors MD;  Location: [facility] OR     BLADDER SURGERY       CORONARY ANGIOGRAPHY ADULT ORDER  10/2014    minimal CAD     CV LEFT HEART CATH N/A 12/11/2018    Procedure: Left Heart Cath;  Surgeon: Sadiq Carrasco MD;  Location:  HEART CARDIAC CATH LAB     CYSTOSCOPY       CYSTOSCOPY,  BIOPSY BLADDER, COMBINED N/A 2016    Procedure: COMBINED CYSTOSCOPY, BIOPSY BLADDER;  Surgeon: Kar Nuñez MD;  Location: [facility] OR     CYSTOSCOPY, TRANSURETHRAL RESECTION (TUR) TUMOR BLADDER, COMBINED N/A 2016    Procedure: COMBINED CYSTOSCOPY, TRANSURETHRAL RESECTION (TUR) TUMOR BLADDER;  Surgeon: Kar Nuñez MD;  Location: [facility] OR     ESOPHAGOSCOPY, GASTROSCOPY, DUODENOSCOPY (EGD), COMBINED N/A 2016    Procedure: COMBINED ESOPHAGOSCOPY, GASTROSCOPY, DUODENOSCOPY (EGD);  Surgeon: Freddie Diez MD;  Location: [facility] GI     OPEN REDUCTION INTERNAL FIXATION HIP BIPOLAR Left 2018    Procedure: Left bipolar hemiarthroplasty;  Surgeon: Scar Reynolds MD;  Location: [facility] OR       FAMILY HISTORY:   Family History   Problem Relation Age of Onset     Cerebrovascular Disease Mother      Cancer Father        SOCIAL HISTORY:  Social History     Tobacco Use     Smoking status: Former Smoker     Types: Cigarettes     Quit date: 2/3/1996     Years since quittin.5     Smokeless tobacco: Never Used   Substance Use Topics     Alcohol use: No     Alcohol/week: 0.0 standard drinks       MEDICATIONS:  Current Outpatient Medications   Medication Sig Dispense Refill     ACE/ARB/ARNI NOT PRESCRIBED (INTENTIONAL) Please choose reason not prescribed from choices below.       acetaminophen (TYLENOL) 325 MG tablet Take 2 tablets (650 mg) by mouth every 6 hours as needed for mild pain or other (and adjunct with moderate or severe pain or per patient request)       albuterol (PROAIR HFA/PROVENTIL HFA/VENTOLIN HFA) 108 (90 Base) MCG/ACT inhaler Inhale 2 puffs into the lungs every 6 hours (Patient taking differently: Inhale 2 puffs into the lungs every 6 hours as needed) 1 each 11     aspirin 81 MG EC tablet Take 81 mg by mouth every evening        budesonide (PULMICORT) 0.5 MG/2ML neb solution Take 2 mLs (0.5 mg) by nebulization 2 times daily 120 mL 11     escitalopram (LEXAPRO) 10 MG tablet Take 1  "tablet (10 mg) by mouth daily 30 tablet 3     ferrous sulfate (IRON) 325 (65 FE) MG tablet Take 325 mg by mouth daily (with breakfast)        furosemide (LASIX) 20 MG tablet Take 20 mg by mouth Take one tablet on Sunday, Monday, Wednesday, Thursday and Saturday       Immune Globulin, Human, (OCTAGAM) 5 GM/100ML SOLN into the vein every 6 weeks.    Hold this medication while in TCU-resume at discharge from TCU       levothyroxine (SYNTHROID/LEVOTHROID) 88 MCG tablet Take 1 tablet (88 mcg) by mouth daily 90 tablet 0     metoprolol succinate ER (TOPROL XL) 25 MG 24 hr tablet Take 1 tablet (25 mg) by mouth daily 90 tablet 2     Multiple Vitamins-Minerals (MULTIVITAMIN ADULT) CHEW Take 2 chew tab by mouth daily       predniSONE (DELTASONE) 10 MG tablet 3 tabs PO daily for 2 days, then 2 tabs PO daily for 3 days, then 1 tab PO daily for 5 days and then stop 17 tablet 0     Revefenacin 175 MCG/3ML SOLN Inhale 3 mLs (175 mcg) into the lungs daily 90 mL 11     tiotropium (SPIRIVA) 18 MCG inhaled capsule Inhale 1 capsule (18 mcg) into the lungs daily       traZODone (DESYREL) 50 MG tablet Take 1 tablet (50 mg) by mouth At Bedtime 90 tablet 0         Post Medication Reconciliation Status: Previously reconciled and completed.      ALLERGIES:  Allergies   Allergen Reactions     Diagnostic X-Ray Materials Hives     CT DYE     Contrast Dye Hives     CT DYE     Prolia [Denosumab]      Osteonecrosis jaw       Rocephin [Ceftriaxone] Itching     Wound Dressing Adhesive        ROS:  10 point ROS were negative other than the symptoms noted above in the HPI.    PHYSICAL EXAM:  Vital signs were reviewed in the chart.  Vital Signs: /51   Pulse 78   Temp 97.3  F (36.3  C)   Resp 18   Ht 1.499 m (4' 11\")   Wt 41.7 kg (92 lb)   LMP  (LMP Unknown)   SpO2 97%   BMI 18.58 kg/m    General: Frail appearing but comfortable and in no acute distress  HEENT: Conjunctival pallor, no scleral icterus or injection, moist oral " mucosa  Cardiovascular: Normal S1, S2, RRR  Respiratory: Decreased breath sounds throughout the lung fields, no wheezing, rhonchi, or crackles  GI: Abdomen soft, non-tender, non-distended, +BS  Extremities: No LE edema  Neuro: CX II-XII grossly intact; ROM in all four extremities grossly intact  Psych: Alert and oriented x3; normal affect  Skin: No acute rash    LABORATORY/IMAGING DATA:  All relevant labs and imaging data in [facility] and/or Care Everywhere were personally reviewed today.      Most Recent 3 CBC's:Recent Labs   Lab Test 08/15/22  0508 08/09/22  0623 08/07/22  0618   .2* 88.3* 102.4*   HGB 8.4* 8.6* 9.1*   * 99 99   PLT 95* 55* 66*     Most Recent 3 BMP's:Recent Labs   Lab Test 08/15/22  0508 08/11/22  0716 08/10/22  0623 08/09/22  0623 08/08/22  0810 08/07/22  0618     --   --  130*  --  131*   POTASSIUM 4.1 4.0 4.0 4.3 4.8 4.3   CHLORIDE 96*  --   --  93*  --  94*   CO2 38*  --   --  32*  --  32*   BUN 17.3  --   --  20.3  --  18.2   CR 0.79  --   --  0.71 0.84 0.91   ANIONGAP 3*  --   --  5*  --  5*   CARLOS 8.6  --   --  8.2  --  8.6   GLC 77  --   --  81  --  129*         ASSESSMENT/PLAN:  Acute COPD exacerbation.  Oxygen dependent, on O2 at 2 L/min continuously.  Treated with a course of levofloxacin, nebulizer, and IV methylprednisolone and discharged on oral prednisone taper, .  PTA revenfenacin is on hold and substituted by Spiriva for TCU stay.  Symptoms have markedly improved.  Plan:  Complete prednisone taper as ordered  Continue supplemental oxygen at 2 L/min  Continue albuterol 108 mcg, 2 puffs every 6 hours  Continue budesonide 0.5 mg inhalation twice daily  Continue Spiriva 18 mcg, inhalation daily  Monitor respiratory status    Demand ischemia,  Essential hypertension.  History of stress cardiomyopathy, LVEF 30-35% in December 2018, now recovered, last LVEF 60-65% in March 2021; no significant CAD per angiogram in December 2018,  Mild mitral and tricuspid  regurgitation.  Elevated troponin I of 60 likely due to demand ischemia in the setting of acute COPD exacerbation.  Blood pressure is fairly controlled.  She currently weighs 92 LBS.  Plan:  Continue metoprolol ER 25 mg daily  Continue furosemide 20 mg every Monday, Wednesday, Thursday, Saturday, and Sunday  Continue to monitor blood pressure, weight, volume status    CLL,  Chronic anemia, baseline Hgb 9-11  Thrombocytopenia.  On IVIG since 2004; she follows with Dr. Ivory at [facility]  Last CBC on August 15 with white count of 154.2, hemoglobin of 8.4, and platelet count of 95,000.  Worsening leukocytosis likely due to recent steroid therapy.  Plan:  Continue ferrous sulfate 325 mg daily  Monitor CBC periodically  Transfuse PRN for hemoglobin less than 7    Mild hyponatremia.  Resolved.  Plan:   Monitor sodium level periodically    Hypothyroidism.  Last TSH was 12.93 on May 16, 2022.  Plan:  Continue levothyroxine 88 mcg daily  Follow-up as outpatient for repeat TSH and adjustment of levothyroxine dose if indicated    History of bladder cancer, s/p TURBT.  Plan:  Follow as outpatient    Anxiety,  Insomnia.  Plan:  Continue escitalopram 10 mg daily  Continue trazodone 50 mg nightly    Severe malnutrition.  Per assessment in the hospital.  BMI 18.6.  Plan:  Continue nutritional supplement    Mild dysphagia.  Patient is currently on dysphagia diet level 3 with regular consistency.  Plan:  Continue SLP evaluation and therapy    Physical deconditioning.  Plan:  Continue PT/OT evaluation and therapy      Orders written by provider at facility:  [facility] on August 22, DX: CLL          Disclaimer: This note may contain text created using speech-recognition software and may contain unintended word substitutions.      Electronically signed by:  Manuela Hannon MD

## 2022-08-16 NOTE — PATIENT INSTRUCTIONS
Orders for Marie CHAPARRITA Kline (4/28/1932), MR# [id number]:    1) CBC on August 22, DX: CLL    Manuela Hannon MD  [facility]

## 2022-08-16 NOTE — LETTER
8/16/2022        RE: Marie Kline  [address]  [facility] [address]        [place] [facility]  INITIAL VISIT NOTE  August 16, 2022    PRIMARY CARE PROVIDER AND CLINIC:  Cunningham, Jeffery L [address] [facility] [address]    CHIEF COMPLAINT:  Hospital follow-up/Initial visit    HPI:    Marie Kline is a 89 year old  (4/28/1932) female who was seen at [facility]CU on August 16, 2022 for an initial visit.     Medical history is notable for oxygen dependent COPD, hypertension, stress cardiomyopathy, hypothyroidism, bladder cancer, CLL, chronic anemia, COVID-19 pneumonia, anxiety, and osteoarthritis.    Summary of hospital course:  Patient was hospitalized at [facility] from August 6 through August 11, 2022 for acute COPD exacerbation.  Patient originally presented with increased shortness of breath and productive cough.  She had been recently started on levofloxacin at her assisted living for pneumonia 2 days prior to admission.  EKG showed normal sinus rhythm.  Chest x-ray was remarkable for mild pulmonary hyperinflation and mild scarring or linear atelectasis in the left midlung.  CBC demonstrated white count of 75.3, hemoglobin of 9.7, and platelet count of 69,000.  BNP was elevated at 1808.  Troponin I was 60.  BMP was significant for sodium level of 135.  Screening for COVID-19, influenza A, and influenza B was negative.  She did receive ceftriaxone in the emergency department and developed generalized pruritus concerning for allergic reaction.  She was treated with a course of levofloxacin, nebulizers, and IV methylprednisolone for acute COPD exacerbation.  Here respiratory status improved.  TCU was recommended per therapies.  She was discharged on prednisone taper.    Patient is admitted to this facility for medical management, nursing care, and rehab.     Of note, history was obtained from patient, facility RN, and extensive review of the  chart.    Today's visit:  Patient was seen in her room, while lying in bed.  She appears frail but in no acute distress.  She is currently on oxygen at 2 L/min.  She does endorse intermittent wheezing.  She denies fever, chills, cough, sputum, dyspnea at rest, nausea, vomiting, abdominal pain, or urinary symptoms.  She reports that she had a bowel movement yesterday.      CODE STATUS:   DNR / DNI    PAST MEDICAL HISTORY:   COPD, oxygen dependent, on O2 at 2 L/min continuously  Hypertension  Stress cardiomyopathy, LVEF 30-35% in December 2018, now recovered, last LVEF 60-65% in March 2021; no significant CAD per angiogram in December 2018  Mild mitral and tricuspid regurgitation  Hypothyroidism  Bladder cancer, s/p TURBT  CLL; on IVIG since 2004; she follows with Dr. Ivory at [facility]  Chronic anemia, baseline Hgb 9-11  Thrombocytopenia  COVID-19 pneumonia in September 2021  Anxiety  Osteoarthritis  Severe malnutrition    Past Medical History:   Diagnosis Date     Arthritis     Generalized     Bladder cancer (H)      Bladder cancer (H)      Cardiomyopathy (H)      CLL (chronic lymphocytic leukemia) (H)      Congestive heart failure (H) 10/24/2014    flash pulm edema ass w/Takotsubo     COPD (chronic obstructive pulmonary disease) (H)     noc O2     Hypertension      Hypothyroid      Mumps      Myocardial infarction (H) 10/2014    Takotsubo presentation w/mild CAD     Pulmonary edema cardiac cause (H) 10/08/2014    associated w/Takotsubo ACS     Tricuspid valve disorders, 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    TR 2-3+ per echo       PAST SURGICAL HISTORY:   Past Surgical History:   Procedure Laterality Date     BIOPSY LYMPH NODE INGUINAL Right 10/23/2018    Procedure: excsional biopsy right inguinal lymph node;  Surgeon: Reji Connors MD;  Location: [facility] OR     BLADDER SURGERY       CORONARY ANGIOGRAPHY ADULT ORDER  10/2014    minimal CAD     CV LEFT HEART CATH N/A 12/11/2018    Procedure: Left Heart Cath;   Surgeon: Sadiq Carrasco MD;  Location:  HEART CARDIAC CATH LAB     CYSTOSCOPY       CYSTOSCOPY, BIOPSY BLADDER, COMBINED N/A 2016    Procedure: COMBINED CYSTOSCOPY, BIOPSY BLADDER;  Surgeon: Kar Nuñez MD;  Location: [facility] OR     CYSTOSCOPY, TRANSURETHRAL RESECTION (TUR) TUMOR BLADDER, COMBINED N/A 2016    Procedure: COMBINED CYSTOSCOPY, TRANSURETHRAL RESECTION (TUR) TUMOR BLADDER;  Surgeon: Kar Nuñez MD;  Location: [facility] OR     ESOPHAGOSCOPY, GASTROSCOPY, DUODENOSCOPY (EGD), COMBINED N/A 2016    Procedure: COMBINED ESOPHAGOSCOPY, GASTROSCOPY, DUODENOSCOPY (EGD);  Surgeon: Freddie Diez MD;  Location:  GI     OPEN REDUCTION INTERNAL FIXATION HIP BIPOLAR Left 2018    Procedure: Left bipolar hemiarthroplasty;  Surgeon: Scar Reynolds MD;  Location:  OR       FAMILY HISTORY:   Family History   Problem Relation Age of Onset     Cerebrovascular Disease Mother      Cancer Father        SOCIAL HISTORY:  Social History     Tobacco Use     Smoking status: Former Smoker     Types: Cigarettes     Quit date: 2/3/1996     Years since quittin.5     Smokeless tobacco: Never Used   Substance Use Topics     Alcohol use: No     Alcohol/week: 0.0 standard drinks       MEDICATIONS:  Current Outpatient Medications   Medication Sig Dispense Refill     ACE/ARB/ARNI NOT PRESCRIBED (INTENTIONAL) Please choose reason not prescribed from choices below.       acetaminophen (TYLENOL) 325 MG tablet Take 2 tablets (650 mg) by mouth every 6 hours as needed for mild pain or other (and adjunct with moderate or severe pain or per patient request)       albuterol (PROAIR HFA/PROVENTIL HFA/VENTOLIN HFA) 108 (90 Base) MCG/ACT inhaler Inhale 2 puffs into the lungs every 6 hours (Patient taking differently: Inhale 2 puffs into the lungs every 6 hours as needed) 1 each 11     aspirin 81 MG EC tablet Take 81 mg by mouth every evening        budesonide (PULMICORT) 0.5 MG/2ML neb solution Take 2  "mLs (0.5 mg) by nebulization 2 times daily 120 mL 11     escitalopram (LEXAPRO) 10 MG tablet Take 1 tablet (10 mg) by mouth daily 30 tablet 3     ferrous sulfate (IRON) 325 (65 FE) MG tablet Take 325 mg by mouth daily (with breakfast)        furosemide (LASIX) 20 MG tablet Take 20 mg by mouth Take one tablet on Sunday, Monday, Wednesday, Thursday and Saturday       Immune Globulin, Human, (OCTAGAM) 5 GM/100ML SOLN into the vein every 6 weeks.    Hold this medication while in TCU-resume at discharge from TCU       levothyroxine (SYNTHROID/LEVOTHROID) 88 MCG tablet Take 1 tablet (88 mcg) by mouth daily 90 tablet 0     metoprolol succinate ER (TOPROL XL) 25 MG 24 hr tablet Take 1 tablet (25 mg) by mouth daily 90 tablet 2     Multiple Vitamins-Minerals (MULTIVITAMIN ADULT) CHEW Take 2 chew tab by mouth daily       predniSONE (DELTASONE) 10 MG tablet 3 tabs PO daily for 2 days, then 2 tabs PO daily for 3 days, then 1 tab PO daily for 5 days and then stop 17 tablet 0     Revefenacin 175 MCG/3ML SOLN Inhale 3 mLs (175 mcg) into the lungs daily 90 mL 11     tiotropium (SPIRIVA) 18 MCG inhaled capsule Inhale 1 capsule (18 mcg) into the lungs daily       traZODone (DESYREL) 50 MG tablet Take 1 tablet (50 mg) by mouth At Bedtime 90 tablet 0         Post Medication Reconciliation Status: Previously reconciled and completed.      ALLERGIES:  Allergies   Allergen Reactions     Diagnostic X-Ray Materials Hives     CT DYE     Contrast Dye Hives     CT DYE     Prolia [Denosumab]      Osteonecrosis jaw       Rocephin [Ceftriaxone] Itching     Wound Dressing Adhesive        ROS:  10 point ROS were negative other than the symptoms noted above in the HPI.    PHYSICAL EXAM:  Vital signs were reviewed in the chart.  Vital Signs: /51   Pulse 78   Temp 97.3  F (36.3  C)   Resp 18   Ht 1.499 m (4' 11\")   Wt 41.7 kg (92 lb)   LMP  (LMP Unknown)   SpO2 97%   BMI 18.58 kg/m    General: Frail appearing but comfortable and in no " acute distress  HEENT: Conjunctival pallor, no scleral icterus or injection, moist oral mucosa  Cardiovascular: Normal S1, S2, RRR  Respiratory: Decreased breath sounds throughout the lung fields, no wheezing, rhonchi, or crackles  GI: Abdomen soft, non-tender, non-distended, +BS  Extremities: No LE edema  Neuro: CX II-XII grossly intact; ROM in all four extremities grossly intact  Psych: Alert and oriented x3; normal affect  Skin: No acute rash    LABORATORY/IMAGING DATA:  All relevant labs and imaging data in [facility] and/or Care Everywhere were personally reviewed today.      Most Recent 3 CBC's:Recent Labs   Lab Test 08/15/22  0508 08/09/22  0623 08/07/22  0618   .2* 88.3* 102.4*   HGB 8.4* 8.6* 9.1*   * 99 99   PLT 95* 55* 66*     Most Recent 3 BMP's:Recent Labs   Lab Test 08/15/22  0508 08/11/22  0716 08/10/22  0623 08/09/22  0623 08/08/22  0810 08/07/22  0618     --   --  130*  --  131*   POTASSIUM 4.1 4.0 4.0 4.3 4.8 4.3   CHLORIDE 96*  --   --  93*  --  94*   CO2 38*  --   --  32*  --  32*   BUN 17.3  --   --  20.3  --  18.2   CR 0.79  --   --  0.71 0.84 0.91   ANIONGAP 3*  --   --  5*  --  5*   CARLOS 8.6  --   --  8.2  --  8.6   GLC 77  --   --  81  --  129*         ASSESSMENT/PLAN:  Acute COPD exacerbation.  Oxygen dependent, on O2 at 2 L/min continuously.  Treated with a course of levofloxacin, nebulizer, and IV methylprednisolone and discharged on oral prednisone taper, .  PTA revenfenacin is on hold and substituted by Spiriva for TCU stay.  Symptoms have markedly improved.  Plan:  Complete prednisone taper as ordered  Continue supplemental oxygen at 2 L/min  Continue albuterol 108 mcg, 2 puffs every 6 hours  Continue budesonide 0.5 mg inhalation twice daily  Continue Spiriva 18 mcg, inhalation daily  Monitor respiratory status    Demand ischemia,  Essential hypertension.  History of stress cardiomyopathy, LVEF 30-35% in December 2018, now recovered, last LVEF 60-65% in March 2021; no  significant CAD per angiogram in December 2018,  Mild mitral and tricuspid regurgitation.  Elevated troponin I of 60 likely due to demand ischemia in the setting of acute COPD exacerbation.  Blood pressure is fairly controlled.  She currently weighs 92 LBS.  Plan:  Continue metoprolol ER 25 mg daily  Continue furosemide 20 mg every Monday, Wednesday, Thursday, Saturday, and Sunday  Continue to monitor blood pressure, weight, volume status    CLL,  Chronic anemia, baseline Hgb 9-11  Thrombocytopenia.  On IVIG since 2004; she follows with Dr. Ivory at [facility]  Last CBC on August 15 with white count of 154.2, hemoglobin of 8.4, and platelet count of 95,000.  Worsening leukocytosis likely due to recent steroid therapy.  Plan:  Continue ferrous sulfate 325 mg daily  Monitor CBC periodically  Transfuse PRN for hemoglobin less than 7    Mild hyponatremia.  Resolved.  Plan:   Monitor sodium level periodically    Hypothyroidism.  Last TSH was 12.93 on May 16, 2022.  Plan:  Continue levothyroxine 88 mcg daily  Follow-up as outpatient for repeat TSH and adjustment of levothyroxine dose if indicated    History of bladder cancer, s/p TURBT.  Plan:  Follow as outpatient    Anxiety,  Insomnia.  Plan:  Continue escitalopram 10 mg daily  Continue trazodone 50 mg nightly    Severe malnutrition.  Per assessment in the hospital.  BMI 18.6.  Plan:  Continue nutritional supplement    Mild dysphagia.  Patient is currently on dysphagia diet level 3 with regular consistency.  Plan:  Continue SLP evaluation and therapy    Physical deconditioning.  Plan:  Continue PT/OT evaluation and therapy      Orders written by provider at facility:  [facility] on August 22, DX: CLL          Disclaimer: This note may contain text created using speech-recognition software and may contain unintended word substitutions.      Electronically signed by:  Manuela Hannon MD                          Sincerely,        Manuela Hannon MD

## 2022-08-17 NOTE — PROGRESS NOTES
Clinic Care Coordination Contact  Care Team Conversations    Situation: Attended patient's TCU Care Conference     Background: patient recently admitted at [facility] due to productive cough and increased shortness of breath and was found to have COPD exacerbation with likely infective bronchitis and is currently recovering and [facility].    Assessment: Attending Care Conference today included patient's Assisted Living Facilty nursing director (Destiney), TCU care team: SW, therapies, dietician as well as patient and family.    Patient is able to independently complete bed mobility and transfers and can independently use walker but stamina is continued barrier: patient's O2 saturations drop into the low 80% range, even on 2L O2.     Patient is working with OT on independence in dressing, toileting, and is near her baseline with those ADL tasks.     Patient scored a 5.1/5.6 on her CPT exam, which is supportive of patient's plan to return to her prior Assisted Living.     Patient and family mentioned they are in need of a new nebulizer machine at home, as patients is old and she is using a loaner from her [facility].       Plan/Recommendations: Discharge is likely planned for early-mid next week.   TCU SW will ask attending NP to write new DME order for nebulizer machine and then TCU will work with [facility] to determine if/when machine can be picked up.     Patient will return home with her baseline level of 2L O2, suppled by [employer]    RN CPN will make patient's post TCU follow up with PCP-soonest available in person was 9/9/2022 at 2:45 PM     Patient likely to resume home health care with referral placed to her agency of choice, [facility].     RN CPN will CC this chart note to lead SW CC, for awareness of plan.     Paola Borja RN  Clinical Product Navigator   Ambulatory Care Coordination  [phone]

## 2022-08-17 NOTE — PATIENT INSTRUCTIONS
Orders for Marie Kline (4/28/1932), MR# [id number]:    1) At TCU discharge discontinue Spiriva and resume PTA revefenacin 175 mcg/3 ml, 3 ml inhalation daily    2) Follow-up with PCP with repeat CBC, BMP, and TSH in 1 week      Manuela Hannon MD  [facility]

## 2022-08-17 NOTE — PROGRESS NOTES
[facility] DISCHARGE SUMMARY  PATIENT'S NAME: Marie Kline  YOB: 1932  MEDICAL RECORD NUMBER:  [id number]  Place of Service where encounter took place: [facility]    PRIMARY CARE PROVIDER AND CLINIC RESPONSIBLE AFTER TRANSFER:   [facility] Dumont MD[facility] [address] [facility] [address]     Transferring providers: Manuela Hannon MD,   Recent Hospitalization/ED:  [facility] Hospital stay Aug 6 to Aug 11.  Date of SNF Admission: August 11, 2022  Date of SNF (anticipated) Discharge: August 22, 2022  Discharged to: [facility]  Cognitive Scores: UMS 16/30  Physical Function: Able to walk 150 feet with 4-wheel walker and standby assist  DME: 4-wheel walker, Oxygen, nebulizer    CODE STATUS/ADVANCE DIRECTIVES DISCUSSION:  DNR / DNI     ALLERGIES: Diagnostic x-ray materials, Contrast dye, Prolia [denosumab], Rocephin [ceftriaxone], and Wound dressing adhesive    HPI and SUMMARY OF HOSPITAL COURSE:  Marie Kline is a 89 year old  (4/28/1932) female who was seen at [facility] on August 16, 2022 for an initial visit.      Medical history is notable for oxygen dependent COPD, hypertension, stress cardiomyopathy, hypothyroidism, bladder cancer, CLL, chronic anemia, COVID-19 pneumonia, anxiety, and osteoarthritis.     Patient was hospitalized at [facility] from August 6 through August 11, 2022 for acute COPD exacerbation.  Patient originally presented with increased shortness of breath and productive cough.  She had been recently started on levofloxacin at her assisted living for pneumonia 2 days prior to admission.  EKG showed normal sinus rhythm.  Chest x-ray was remarkable for mild pulmonary hyperinflation and mild scarring or linear atelectasis in the left midlung.  CBC demonstrated white count of 75.3, hemoglobin of 9.7, and platelet count of 69,000.  BNP was elevated at 1,808.  Troponin I was 60.  BMP was significant  for sodium level of 135.  Screening for COVID-19, influenza A, and influenza B was negative.  She did receive ceftriaxone in the emergency department and developed generalized pruritus concerning for allergic reaction.  She was treated with a course of levofloxacin, nebulizer, and IV methylprednisolone for acute COPD exacerbation.  Here respiratory status improved.  TCU was recommended per therapies.  She was discharged on prednisone taper.     Patient was admitted to this facility for medical management, nursing care, and rehab.    Today's visit:  Patient was seen in her room, while lying in bed.  She appears frail but in no acute distress.  She continues to use NC oxygen at 2 L/min.  She feels that her respiratory status is improving.  She reports no dyspnea at rest.  She denies fever, chills, productive cough, chest pain, palpitation, dyspnea, nausea, vomiting, abdominal pain, or urinary symptoms.  She had a bowel movement earlier today.      DISCHARGE DIAGNOSIS/NURSING FACILITY COURSE:   Acute COPD exacerbation, improved.  Oxygen dependent, on O2 at 2 L/min continuously.  Treated with a course of levofloxacin, nebulizer, and IV methylprednisolone in the hospital and discharged on oral prednisone taper.  PTA revenfenacin was held and substituted by Spiriva for TCU stay.  Plan:  Complete prednisone taper as ordered  Continue supplemental oxygen at 2 L/min  Continue albuterol 108 mcg, 2 puffs every 6 hours  Continue budesonide 0.5 mg inhalation twice daily  Continue Spiriva 18 mcg, inhalation daily     Demand ischemia,  Essential hypertension.  History of stress cardiomyopathy, LVEF 30-35% in December 2018, now recovered, last LVEF 60-65% in March 2021; no significant CAD per angiogram in December 2018,  Mild mitral and tricuspid regurgitation.  Elevated troponin I of 60 likely due to demand ischemia in the setting of acute COPD exacerbation.  Blood pressure remains fairly controlled.  She currently weighs 95 LBS  and looks euvolemic.  Plan:  Continue metoprolol ER 25 mg daily  Continue furosemide 20 mg every Monday, Wednesday, Thursday, Saturday, and Sunday  Continue to monitor blood pressure, weight, volume status     CLL,  Chronic anemia, baseline Hgb 9-11  Thrombocytopenia.  On IVIG since 2004; she follows with Dr. Ivory at [facility]  Last CBC on August 15 with white count of 154.2, hemoglobin of 8.4, and platelet count of 95,000.  Worsening leukocytosis likely due to recent steroid therapy.  Plan:  Continue ferrous sulfate 325 mg daily  Transfuse PRN for hemoglobin less than 7  Monitor CBC and follow-up as outpatient     Mild hyponatremia.  Resolved.  Plan:   Monitor sodium level periodically     Hypothyroidism.  Last TSH was 12.93 on May 16, 2022.  Plan:  Continue levothyroxine 88 mcg daily  Follow-up as outpatient for repeat TSH and adjustment of levothyroxine dose if indicated     History of bladder cancer, s/p TURBT.  Plan:  Follow as outpatient     Anxiety,  Insomnia.  Stable.  Plan:  Continue escitalopram 10 mg daily  Continue trazodone 50 mg nightly     Severe malnutrition.  Per assessment in the hospital.  BMI 18.6.  Plan:  Continue nutritional supplement     Mild dysphagia.  Patient is currently on dysphagia diet level 3 with regular consistency.  Plan:  Continue DDL3    Cognitive impairment.  SLUMS score 16/30.  She is oriented almost x3.  Plan:  Staff to assist with daily care and mobility     Physical deconditioning.  Improved with PT/OT.      PAST MEDICAL HISTORY:   COPD, oxygen dependent, on O2 at 2 L/min continuously  Hypertension  Stress cardiomyopathy, LVEF 30-35% in December 2018, now recovered, last LVEF 60-65% in March 2021; no significant CAD per angiogram in December 2018  Mild mitral and tricuspid regurgitation  Hypothyroidism  Bladder cancer, s/p TURBT  CLL; on IVIG since 2004; she follows with Dr. Ivory at [facility]  Chronic anemia, baseline Hgb 9-11  Thrombocytopenia  COVID-19 pneumonia in  September 2021  Anxiety  Osteoarthritis  Severe malnutrition  Cognitive impairment (SLUMS 16/30 in August 2022)    Discharge Medications:  Current Outpatient Medications   Medication Sig Dispense Refill     ACE/ARB/ARNI NOT PRESCRIBED (INTENTIONAL) Please choose reason not prescribed from choices below.       acetaminophen (TYLENOL) 325 MG tablet Take 2 tablets (650 mg) by mouth every 6 hours as needed for mild pain or other (and adjunct with moderate or severe pain or per patient request)       albuterol (PROAIR HFA/PROVENTIL HFA/VENTOLIN HFA) 108 (90 Base) MCG/ACT inhaler Inhale 2 puffs into the lungs every 6 hours (Patient taking differently: Inhale 2 puffs into the lungs every 6 hours as needed) 1 each 11     aspirin 81 MG EC tablet Take 81 mg by mouth every evening        budesonide (PULMICORT) 0.5 MG/2ML neb solution Take 2 mLs (0.5 mg) by nebulization 2 times daily 120 mL 11     escitalopram (LEXAPRO) 10 MG tablet Take 1 tablet (10 mg) by mouth daily 30 tablet 3     ferrous sulfate (IRON) 325 (65 FE) MG tablet Take 325 mg by mouth daily (with breakfast)        furosemide (LASIX) 20 MG tablet Take 20 mg by mouth Take one tablet on Sunday, Monday, Wednesday, Thursday and Saturday       Immune Globulin, Human, (OCTAGAM) 5 GM/100ML SOLN into the vein every 6 weeks.    Hold this medication while in TCU-resume at discharge from TCU       levothyroxine (SYNTHROID/LEVOTHROID) 88 MCG tablet Take 1 tablet (88 mcg) by mouth daily 90 tablet 0     metoprolol succinate ER (TOPROL XL) 25 MG 24 hr tablet Take 1 tablet (25 mg) by mouth daily 90 tablet 2     Multiple Vitamins-Minerals (MULTIVITAMIN ADULT) CHEW Take 2 chew tab by mouth daily       predniSONE (DELTASONE) 10 MG tablet 3 tabs PO daily for 2 days, then 2 tabs PO daily for 3 days, then 1 tab PO daily for 5 days and then stop 17 tablet 0     Revefenacin 175 MCG/3ML SOLN Inhale 3 mLs (175 mcg) into the lungs daily 90 mL 11     tiotropium (SPIRIVA) 18 MCG inhaled  "capsule Inhale 1 capsule (18 mcg) into the lungs daily       traZODone (DESYREL) 50 MG tablet Take 1 tablet (50 mg) by mouth At Bedtime 90 tablet 0       Post Medication Reconciliation Status: Completed.        Medication Changes/Rationale:     None.    Controlled medications sent with patient:   None.     ROS:   10 point review of system was performed and was negative except as stated in the history of present illness.      PHYSICAL EXAM:  Vital signs were reviewed in the chart.  Vital Signs: /70   Pulse 69   Temp 98.1  F (36.7  C)   Resp 18   Ht 1.499 m (4' 11\")   Wt 43.4 kg (95 lb 9.6 oz)   LMP  (LMP Unknown)   SpO2 94%   BMI 19.31 kg/m    General: Frail appearing but in no acute distress  HEENT: Conjunctival pallor, no scleral icterus or injection, moist oral mucosa  Cardiovascular: Normal S1, S2, RRR  Respiratory: Decreased breath sounds throughout lung fields  GI: Abdomen soft, non-tender, non-distended, +BS  Extremities: No LE edema  Neuro: CX II-XII grossly intact; ROM in all four extremities grossly intact  Psych: Alert and oriented almost x3; normal affect  Skin: No acute rash    SNF labs:   Most Recent 3 CBC's:Recent Labs   Lab Test 08/15/22  0508 08/09/22  0623 08/07/22  0618   .2* 88.3* 102.4*   HGB 8.4* 8.6* 9.1*   * 99 99   PLT 95* 55* 66*     Most Recent 3 BMP's:Recent Labs   Lab Test 08/15/22  0508 08/11/22  0716 08/10/22  0623 08/09/22  0623 08/08/22  0810 08/07/22  0618     --   --  130*  --  131*   POTASSIUM 4.1 4.0 4.0 4.3 4.8 4.3   CHLORIDE 96*  --   --  93*  --  94*   CO2 38*  --   --  32*  --  32*   BUN 17.3  --   --  20.3  --  18.2   CR 0.79  --   --  0.71 0.84 0.91   ANIONGAP 3*  --   --  5*  --  5*   CARLOS 8.6  --   --  8.2  --  8.6   GLC 77  --   --  81  --  129*         DISCHARGE PLAN:    Follow up labs: CBC, BMP, and TSH in 1 week    Medical Follow Up:        Follow up with primary care provider in 1 week       [facility] scheduled " appointments:  Next 5 appointments (look out 90 days)    Oct 03, 2022 11:30 AM  Return Visit with Marva Powell MD  [facility] ([facility] ) [address]  [address]  [facility] [address]  [phone]           Discharge Services: Home PT, OT, RN, HHA    Discharge Instructions Verbalized to Patient at Discharge:     None      TOTAL DISCHARGE TIME:   Greater than 30 minutes  Electronically signed by:  Manuela Hannon MD         MEDICAL NECESSITY STATEMENT FOR DME    Demographic Information on Marie Kline:    Marie Kline  Gender: female  : 1932  [address]    [facility] [place] [facility] [id number] [phone] (home)     Medical Record: [id number]  Social Security Number: [id number]  Primary Care Provider: Be Dumont  Insurance: Payor: MEDICARE / Plan: MEDICARE / Product Type: Medicare /       PRIMARY DIAGNOSIS: COPD    DME: Nebulizer.  NEBULIZER MED: Budesonide    VITAL SIGNS:  Vitals: LMP  (LMP Unknown)   BMI= There is no height or weight on file to calculate BMI.         MEDICAL NECESSITY STATEMENT:  Patient requires long-term treatment with budesonide through nebulizer for severe COPD    ELECTRONICALLY SIGNED BY CESAR CERTIFIED PROVIDER:  Manuela Hannon MD   NPI: [id number]  [facility]  [address] [place], [address] [place] [address]          Documentation of Face-to-Face and Certification for Home Health Services     Patient: Marie Kline   YOB: 1932  MR Number: [id number]  Today's Date: 2022    I certify that patient: Marie Kline is under my care and that I, or a nurse practitioner or physician's assistant working with me, had a face-to-face encounter that meets the physician face-to-face encounter requirements with this patient on: 2022    This encounter with the patient was in whole, or in part, for the following medical condition,  which is the primary reason for home health care: Physical deconditioning due to acute COPD exacerbation.    I certify that, based on my findings, the following services are medically necessary home health services: Nursing, Occupational Therapy, Physical Therapy and HHA.    My clinical findings support the need for the above services because: Nurse is needed: To assess respiratory status and monitor nebulizer use after changes in medications or other medical regimen.., Occupational Therapy Services are needed to assess and treat cognitive ability and address ADL safety due to cognitive impairment. and Physical Therapy Services are needed to assess and treat the following functional impairments: activities of daily living.    Further, I certify that my clinical findings support that this patient is homebound (i.e. absences from home require considerable and taxing effort and are for medical reasons or [employer] services or infrequently or of short duration when for other reasons) because: Requires assistance of another person or specialized equipment to access medical services because patient: Requires supervision of another for safe transfer...    Based on the above findings. I certify that this patient is confined to the home and needs intermittent skilled nursing care, physical therapy and/or speech therapy.  The patient is under my care, and I have initiated the establishment of the plan of care.  This patient will be followed by a physician who will periodically review the plan of care.  Physician/Provider to provide follow up care: Be Dumont    Responsible Medicare certified PEC Physician: Manuela Hannon MD  Physician Signature: See electronic signature associated with these discharge orders.  Date: 8/18/2022      Disclaimer: This note may contain text created using speech-recognition software and may contain unintended word substitutions.

## 2022-08-18 NOTE — PROGRESS NOTES
Clinic Care Coordination - Chart Review Only    Situation/Background: Patient chart reviewed by care coordinator related to [facility] conversion.    Assessment: Patient continues to be followed by Clinic Care Coordination.    Plan: Patient's chart updated to align with [facility] program for ongoing patient management.    DAVID Nuñez/[facility]  Social Work Care Coordinator  [facility] [place] [place], and [place]  Phone: [phone]

## 2022-08-18 NOTE — LETTER
8/18/2022        RE: Marie Kline  [address]  [facility] [address]        [place] [facility] DISCHARGE SUMMARY  PATIENT'S NAME: Marie Kline  YOB: 1932  MEDICAL RECORD NUMBER:  [id number]  Place of Service where encounter took place: [facility]    PRIMARY CARE PROVIDER AND CLINIC RESPONSIBLE AFTER TRANSFER:   Be Dumont MD, [address] [facility] [address]     Transferring providers: Manuela Hannon MD,   Recent Hospitalization/ED:  [facility] stay Aug 6 to Aug 11.  Date of SNF Admission: August 11, 2022  Date of SNF (anticipated) Discharge: August 22, 2022  Discharged to: [facility]  Cognitive Scores: SLUMS 16/30  Physical Function: Able to walk 150 feet with 4-wheel walker and standby assist  DME: 4-wheel walker, Oxygen, nebulizer    CODE STATUS/ADVANCE DIRECTIVES DISCUSSION:  DNR / DNI     ALLERGIES: Diagnostic x-ray materials, Contrast dye, Prolia [denosumab], Rocephin [ceftriaxone], and Wound dressing adhesive    HPI and SUMMARY OF HOSPITAL COURSE:  Marie Kline is a 89 year old  (4/28/1932) female who was seen at [facility] on August 16, 2022 for an initial visit.      Medical history is notable for oxygen dependent COPD, hypertension, stress cardiomyopathy, hypothyroidism, bladder cancer, CLL, chronic anemia, COVID-19 pneumonia, anxiety, and osteoarthritis.     Patient was hospitalized at [facility] from August 6 through August 11, 2022 for acute COPD exacerbation.  Patient originally presented with increased shortness of breath and productive cough.  She had been recently started on levofloxacin at her assisted living for pneumonia 2 days prior to admission.  EKG showed normal sinus rhythm.  Chest x-ray was remarkable for mild pulmonary hyperinflation and mild scarring or linear atelectasis in the left midlung.  CBC demonstrated white count of 75.3, hemoglobin of  9.7, and platelet count of 69,000.  BNP was elevated at 1,808.  Troponin I was 60.  BMP was significant for sodium level of 135.  Screening for COVID-19, influenza A, and influenza B was negative.  She did receive ceftriaxone in the emergency department and developed generalized pruritus concerning for allergic reaction.  She was treated with a course of levofloxacin, nebulizer, and IV methylprednisolone for acute COPD exacerbation.  Here respiratory status improved.  TCU was recommended per therapies.  She was discharged on prednisone taper.     Patient was admitted to this facility for medical management, nursing care, and rehab.    Today's visit:  Patient was seen in her room, while lying in bed.  She appears frail but in no acute distress.  She continues to use NC oxygen at 2 L/min.  She feels that her respiratory status is improving.  She reports no dyspnea at rest.  She denies fever, chills, productive cough, chest pain, palpitation, dyspnea, nausea, vomiting, abdominal pain, or urinary symptoms.  She had a bowel movement earlier today.      DISCHARGE DIAGNOSIS/NURSING FACILITY COURSE:   Acute COPD exacerbation, improved.  Oxygen dependent, on O2 at 2 L/min continuously.  Treated with a course of levofloxacin, nebulizer, and IV methylprednisolone in the hospital and discharged on oral prednisone taper.  PTA revenfenacin was held and substituted by Spiriva for TCU stay.  Plan:  Complete prednisone taper as ordered  Continue supplemental oxygen at 2 L/min  Continue albuterol 108 mcg, 2 puffs every 6 hours  Continue budesonide 0.5 mg inhalation twice daily  Continue Spiriva 18 mcg, inhalation daily     Demand ischemia,  Essential hypertension.  History of stress cardiomyopathy, LVEF 30-35% in December 2018, now recovered, last LVEF 60-65% in March 2021; no significant CAD per angiogram in December 2018,  Mild mitral and tricuspid regurgitation.  Elevated troponin I of 60 likely due to demand ischemia in the  setting of acute COPD exacerbation.  Blood pressure remains fairly controlled.  She currently weighs 95 LBS and looks euvolemic.  Plan:  Continue metoprolol ER 25 mg daily  Continue furosemide 20 mg every Monday, Wednesday, Thursday, Saturday, and Sunday  Continue to monitor blood pressure, weight, volume status     CLL,  Chronic anemia, baseline Hgb 9-11  Thrombocytopenia.  On IVIG since 2004; she follows with Dr. Ivory at [facility]  Last CBC on August 15 with white count of 154.2, hemoglobin of 8.4, and platelet count of 95,000.  Worsening leukocytosis likely due to recent steroid therapy.  Plan:  Continue ferrous sulfate 325 mg daily  Transfuse PRN for hemoglobin less than 7  Monitor CBC and follow-up as outpatient     Mild hyponatremia.  Resolved.  Plan:   Monitor sodium level periodically     Hypothyroidism.  Last TSH was 12.93 on May 16, 2022.  Plan:  Continue levothyroxine 88 mcg daily  Follow-up as outpatient for repeat TSH and adjustment of levothyroxine dose if indicated     History of bladder cancer, s/p TURBT.  Plan:  Follow as outpatient     Anxiety,  Insomnia.  Stable.  Plan:  Continue escitalopram 10 mg daily  Continue trazodone 50 mg nightly     Severe malnutrition.  Per assessment in the hospital.  BMI 18.6.  Plan:  Continue nutritional supplement     Mild dysphagia.  Patient is currently on dysphagia diet level 3 with regular consistency.  Plan:  Continue DDL3    Cognitive impairment.  SLUMS score 16/30.  She is oriented almost x3.  Plan:  Staff to assist with daily care and mobility     Physical deconditioning.  Improved with PT/OT.      PAST MEDICAL HISTORY:   COPD, oxygen dependent, on O2 at 2 L/min continuously  Hypertension  Stress cardiomyopathy, LVEF 30-35% in December 2018, now recovered, last LVEF 60-65% in March 2021; no significant CAD per angiogram in December 2018  Mild mitral and tricuspid regurgitation  Hypothyroidism  Bladder cancer, s/p TURBT  CLL; on IVIG since 2004; she follows  with Dr. Ivory at [facility]  Chronic anemia, baseline Hgb 9-11  Thrombocytopenia  COVID-19 pneumonia in September 2021  Anxiety  Osteoarthritis  Severe malnutrition  Cognitive impairment (SLUMS 16/30 in August 2022)    Discharge Medications:  Current Outpatient Medications   Medication Sig Dispense Refill     ACE/ARB/ARNI NOT PRESCRIBED (INTENTIONAL) Please choose reason not prescribed from choices below.       acetaminophen (TYLENOL) 325 MG tablet Take 2 tablets (650 mg) by mouth every 6 hours as needed for mild pain or other (and adjunct with moderate or severe pain or per patient request)       albuterol (PROAIR HFA/PROVENTIL HFA/VENTOLIN HFA) 108 (90 Base) MCG/ACT inhaler Inhale 2 puffs into the lungs every 6 hours (Patient taking differently: Inhale 2 puffs into the lungs every 6 hours as needed) 1 each 11     aspirin 81 MG EC tablet Take 81 mg by mouth every evening        budesonide (PULMICORT) 0.5 MG/2ML neb solution Take 2 mLs (0.5 mg) by nebulization 2 times daily 120 mL 11     escitalopram (LEXAPRO) 10 MG tablet Take 1 tablet (10 mg) by mouth daily 30 tablet 3     ferrous sulfate (IRON) 325 (65 FE) MG tablet Take 325 mg by mouth daily (with breakfast)        furosemide (LASIX) 20 MG tablet Take 20 mg by mouth Take one tablet on Sunday, Monday, Wednesday, Thursday and Saturday       Immune Globulin, Human, (OCTAGAM) 5 GM/100ML SOLN into the vein every 6 weeks.    Hold this medication while in TCU-resume at discharge from TCU       levothyroxine (SYNTHROID/LEVOTHROID) 88 MCG tablet Take 1 tablet (88 mcg) by mouth daily 90 tablet 0     metoprolol succinate ER (TOPROL XL) 25 MG 24 hr tablet Take 1 tablet (25 mg) by mouth daily 90 tablet 2     Multiple Vitamins-Minerals (MULTIVITAMIN ADULT) CHEW Take 2 chew tab by mouth daily       predniSONE (DELTASONE) 10 MG tablet 3 tabs PO daily for 2 days, then 2 tabs PO daily for 3 days, then 1 tab PO daily for 5 days and then stop 17 tablet 0     Revefenacin 175  "MCG/3ML SOLN Inhale 3 mLs (175 mcg) into the lungs daily 90 mL 11     tiotropium (SPIRIVA) 18 MCG inhaled capsule Inhale 1 capsule (18 mcg) into the lungs daily       traZODone (DESYREL) 50 MG tablet Take 1 tablet (50 mg) by mouth At Bedtime 90 tablet 0       Post Medication Reconciliation Status: Completed.        Medication Changes/Rationale:     None.    Controlled medications sent with patient:   None.     ROS:   10 point review of system was performed and was negative except as stated in the history of present illness.      PHYSICAL EXAM:  Vital signs were reviewed in the chart.  Vital Signs: /70   Pulse 69   Temp 98.1  F (36.7  C)   Resp 18   Ht 1.499 m (4' 11\")   Wt 43.4 kg (95 lb 9.6 oz)   LMP  (LMP Unknown)   SpO2 94%   BMI 19.31 kg/m    General: Frail appearing but in no acute distress  HEENT: Conjunctival pallor, no scleral icterus or injection, moist oral mucosa  Cardiovascular: Normal S1, S2, RRR  Respiratory: Decreased breath sounds throughout lung fields  GI: Abdomen soft, non-tender, non-distended, +BS  Extremities: No LE edema  Neuro: CX II-XII grossly intact; ROM in all four extremities grossly intact  Psych: Alert and oriented almost x3; normal affect  Skin: No acute rash    SNF labs:   Most Recent 3 CBC's:Recent Labs   Lab Test 08/15/22  0508 08/09/22  0623 08/07/22  0618   .2* 88.3* 102.4*   HGB 8.4* 8.6* 9.1*   * 99 99   PLT 95* 55* 66*     Most Recent 3 BMP's:Recent Labs   Lab Test 08/15/22  0508 08/11/22  0716 08/10/22  0623 08/09/22  0623 08/08/22  0810 08/07/22  0618     --   --  130*  --  131*   POTASSIUM 4.1 4.0 4.0 4.3 4.8 4.3   CHLORIDE 96*  --   --  93*  --  94*   CO2 38*  --   --  32*  --  32*   BUN 17.3  --   --  20.3  --  18.2   CR 0.79  --   --  0.71 0.84 0.91   ANIONGAP 3*  --   --  5*  --  5*   CARLOS 8.6  --   --  8.2  --  8.6   GLC 77  --   --  81  --  129*         DISCHARGE PLAN:    Follow up labs: CBC, BMP, and TSH in 1 week    Medical Follow " Up:        Follow up with primary care provider in 1 week       [facility] scheduled appointments:  Next 5 appointments (look out 90 days)    Oct 03, 2022 11:30 AM  Return Visit with Marva Powell MD  [facility] ([facility] ) [address]  [address]  [facility] [address]  [phone]           Discharge Services: Home PT, OT, RN, HHA    Discharge Instructions Verbalized to Patient at Discharge:     None      TOTAL DISCHARGE TIME:   Greater than 30 minutes  Electronically signed by:  Manuela Hannon MD         MEDICAL NECESSITY STATEMENT FOR DME    Demographic Information on Marie Kline:    Marie Kline  Gender: female  : 1932  [address]    [facility] [address]  [phone] (home)     Medical Record: [id number]  Social Security Number: [id number]  Primary Care Provider: Be Dumont  Insurance: Payor: MEDICARE / Plan: MEDICARE / Product Type: Medicare /       PRIMARY DIAGNOSIS: COPD    DME: Nebulizer.  NEBULIZER MED: Budesonide    VITAL SIGNS:  Vitals: LMP  (LMP Unknown)   BMI= There is no height or weight on file to calculate BMI.         MEDICAL NECESSITY STATEMENT:  Patient requires long-term treatment with budesonide through nebulizer for severe COPD    ELECTRONICALLY SIGNED BY CESAR CERTIFIED PROVIDER:  Manuela Hannon MD   NPI: [id number]  [facility]  [address] [place] [address], [address] [place] [id number]          Documentation of Face-to-Face and Certification for Home Health Services     Patient: Marie Kline   YOB: 1932  MR Number: [id number]  Today's Date: 2022    I certify that patient: Marie Kline is under my care and that I, or a nurse practitioner or physician's assistant working with me, had a face-to-face encounter that meets the physician face-to-face encounter requirements with this patient on: 2022    This  encounter with the patient was in whole, or in part, for the following medical condition, which is the primary reason for home health care: Physical deconditioning due to acute COPD exacerbation.    I certify that, based on my findings, the following services are medically necessary home health services: Nursing, Occupational Therapy, Physical Therapy and HHA.    My clinical findings support the need for the above services because: Nurse is needed: To assess respiratory status and monitor nebulizer use after changes in medications or other medical regimen.., Occupational Therapy Services are needed to assess and treat cognitive ability and address ADL safety due to cognitive impairment. and Physical Therapy Services are needed to assess and treat the following functional impairments: activities of daily living.    Further, I certify that my clinical findings support that this patient is homebound (i.e. absences from home require considerable and taxing effort and are for medical reasons or [employer] services or infrequently or of short duration when for other reasons) because: Requires assistance of another person or specialized equipment to access medical services because patient: Requires supervision of another for safe transfer...    Based on the above findings. I certify that this patient is confined to the home and needs intermittent skilled nursing care, physical therapy and/or speech therapy.  The patient is under my care, and I have initiated the establishment of the plan of care.  This patient will be followed by a physician who will periodically review the plan of care.  Physician/Provider to provide follow up care: Be Dumont    Responsible Medicare certified PECOS Physician: Manuela Hannon MD  Physician Signature: See electronic signature associated with these discharge orders.  Date: 8/18/2022      Disclaimer: This note may contain text created using speech-recognition software and may  contain unintended word substitutions.            Sincerely,        Manuela Hannon MD

## 2022-08-22 NOTE — PROGRESS NOTES
Clinic Care Coordination Contact  Care Team Conversations    RN MALKA received confirmation from the TCU Care team indicating patient discharged today to [facility].   The TCU was able to fill a new DME RX for a nebulizer machine with intentions of family picking this up on their way home with patient.     Plan; Will notify lead  CC.     Paola Borja RN  Clinical Product Navigator   Ambulatory Care Coordination  [phone]

## 2022-08-22 NOTE — TELEPHONE ENCOUNTER
Patient with ongoing leukocytosis, .8 on 8/22/2022 is  Discharging from TCU in a few hours.  Patient follows with Dr. Ivory of [facility], has known CLL.  Asked nursing staff at facility to reach out to Dr. Ivory's office to update them on current white blood cell count.  Nursing to follow instructions per recommendations of their office.  Patient should follow-up outpatient.  NOHEMI Stone CNP on 8/22/2022 at 12:45 PM

## 2022-08-23 NOTE — PROGRESS NOTES
Clinic Care Coordination Contact  [facility]/Voicemail       Clinical Data: Care Coordinator Outreach discharge from TCU   Outreach attempted x 1. No answer, unable to leave a message on daughter's  voicemail with call back information \l.  Plan: Care Coordinator will try to reach patient again in 1-2 business days.      TINO Kapoor / DAYRON Akers  [facility]   Care Coordination  [facility]  8/23/2022 9:42 AM

## 2022-08-25 PROBLEM — S72.009A FEMORAL NECK FRACTURE (H): Status: ACTIVE | Noted: 2018-11-18

## 2022-08-25 NOTE — PROGRESS NOTES
Clinic Care Coordination Contact    Per chart review patient currently at [facility] ED 8/25/22. SW CC will reach out to patient upon discharge.     Plan: Lead SW CC will review chart in 1-2 business days.     Mari Carbone RN Care Coordinator  [facility]  Email: [email or web address]  Phone: [phone]

## 2022-08-25 NOTE — ED NOTES
[facility]  ED Nurse Handoff Report    Marie Kline is a 89 year old female   ED Chief complaint: Hip Pain  . ED Diagnosis:   Final diagnoses:   Closed fracture of neck of right femur, initial encounter (H)   CLL (chronic lymphocytic leukemia) (H)     Allergies:   Allergies   Allergen Reactions     Diagnostic X-Ray Materials Hives     CT DYE     Contrast Dye Hives     CT DYE     Prolia [Denosumab]      Osteonecrosis jaw       Rocephin [Ceftriaxone] Itching     Wound Dressing Adhesive        Code Status: Full Code  Activity level - Baseline/Home:  Independent. Activity Level - Current:   Unable to Assess. Lift room needed: No. Bariatric: No   Needed: No   Isolation: No. Infection: Not Applicable.     Vital Signs:   Vitals:    08/25/22 1130 08/25/22 1220 08/25/22 1400 08/25/22 1425   BP: 108/60 108/69 100/51    Pulse: 55 55 58    Resp:       Temp:       TempSrc:       SpO2: 96% 97% 97% 96%       Cardiac Rhythm:  ,      Pain level:    Patient confused: No. Patient Falls Risk: Yes.   Elimination Status: Has voided   Patient Report - Initial Complaint: Trip and fall today. Right hip pain. No LOC. Focused Assessment: Right hip pain. + CMS. Wears 2 LPM O2 at baseline. Dilaudid given for pain with some relief.   Tests Performed: xray Abnormal Results: femoral neck fracture   Treatments provided: pain medications  Family Comments: family notified  OBS brochure/video discussed/provided to patient:  Yes  ED Medications:   Medications   fentaNYL (PF) (SUBLIMAZE) injection 50 mcg (50 mcg Intravenous Given 8/25/22 1340)   ketorolac (TORADOL) injection 15 mg (has no administration in time range)   oxyCODONE (ROXICODONE) tablet 5 mg (5 mg Oral Given 8/25/22 1528)   acetaminophen (TYLENOL) tablet 650 mg (650 mg Oral Given 8/25/22 1527)     Drips infusing:  No  For the majority of the shift, the patient's behavior Green. Interventions performed were none.    Sepsis treatment initiated: No     Patient tested  for COVID 19 prior to admission: YES    ED Nurse Name/Phone Number: Jolly Reyes RN,   3:36 PM  RECEIVING UNIT ED HANDOFF REVIEW    Above ED Nurse Handoff Report was reviewed: Yes  Reviewed by: Zora Turner RN on August 25, 2022 at 6:26 PM

## 2022-08-25 NOTE — ED PROVIDER NOTES
History   Chief Complaint:  Hip Pain    The history is provided by the patient.      Marie Kline is a 89 year old female not on blood thinners with history of HTN, chronic lymphocytic leukemia who presents with right hip pain after falling. Marie explains that she was walking to go eat her breakfast when she suddenly lost her balance and fell without loss of consciousness. Denies pain distal to the right hip, arm or head injury, cough, and fever. Marie rates her pain 6-7/10 in severity. She notes that she uses a walker and supplemental oxygen at baseline.     Review of Systems   Constitutional: Negative for fever.   Respiratory: Negative for cough.    Musculoskeletal: Positive for arthralgias.   Neurological: Negative for syncope and headaches.   All other systems reviewed and are negative.    Allergies:  Diagnostic X-Ray Materials  Contrast Dye  Prolia [Denosumab]  Rocephin [Ceftriaxone]  Wound Dressing Adhesive    Medications:  Albuterol sulfate   Diphenhydramine   Zolpidem   Albuterol    Maxzide    Levothyroxine   Advair   Gabapentin   Atorvastatin   Symbicort   Denosumab    Furosemide   Metoprolol     Past Medical History:     HTN  Hypothyroidism   Chronic lymphocytic leukemia   COPD  Acquired hypogammaglobulinemia   Insomnia     Past Surgical History:    Right inguinal lymph node biopsy   Coronary angiography   Left heart cath   Cystoscopy   Cystoscopy, biopsy bladder, combined   Cystoscopy, transurethral resection tumor bladder, combined   EGD combined   Left hip bipolar ORIF  Appendectomy   Cataract removal   Hysterectomy   Oophorectomy   Right breast lumpectomy     Family History:    Mother- cerebrovascular disease, stroke   Father- lung cancer  Daughter- subarachnoid hemorrhage   Son- alcoholism     Social History:  The patient presents to the ED alone   Patient lives in independent living at [facility].   PCP: Be Dumont MD    Physical Exam     Patient Vitals for the past 24 hrs:   BP Temp  Temp src Pulse Resp SpO2   08/25/22 1425 -- -- -- -- -- 96 %   08/25/22 1400 100/51 -- -- 58 -- 97 %   08/25/22 1220 108/69 -- -- 55 -- 97 %   08/25/22 1130 108/60 -- -- 55 -- 96 %   08/25/22 1127 108/60 98  F (36.7  C) Temporal 90 20 96 %     Physical Exam  Eyes:  Sclera white; Pupils are equal and round  ENT:    External ears and nares normal  CV:  Rate as above with regular rhythm; perfusion normal RLE  Resp:  Breath sounds clear and equal bilaterally; on home oxygen    Non-labored, no retractions or accessory muscle use  GI:  Abdomen is soft, non-tender, non-distended    No rebound tenderness or peritoneal features  MS:  R hip pain at rest; moves foot and ankle normally.  No other tenderness in that leg.  Moves all other extremities  Skin:  Warm and dry, no open wound noted  Neuro:  Speech is normal and fluent.     Emergency Department Course   ECG:  ECG taken at 1155, ECG read at 1200  Sinus rhythm with 1st degree AV block with premature atrial complexes   Otherwise normal ECG  No change compared to EKG dated 08/06/2022   Rate 65 bpm. NJ interval 230 ms. QRS duration 94 ms. QT/QTc 414/430 ms. P-R-T axes 81 71 71.    Imaging:  XR Chest 1 View   Final Result   IMPRESSION: Hyperinflation consistent with COPD. Linear mild scarring   in the left lower lung. No acute airspace opacity, pleural effusion or   pneumothorax. Normal heart size. Aortic calcifications. Old right   posterior rib fracture. S-shaped thoracolumbar curve with degenerative   changes.      GEORGIA PAIGE MD            SYSTEM ID:  [id number]      XR Pelvis w Hip Right 1 View   Final Result   IMPRESSION: There is a right femoral neck fracture. There is 2 to 3 cm   of displacement and impaction. Moderate apex anterior and   superolateral angulation. Bipolar left hip hemiarthroplasty   incidentally noted. Arterial calcifications and degenerative changes   in the lumbar spine incidentally noted.      ELAINA BERUMEN MD            SYSTEM ID:  [id number]         Report per radiology    Laboratory:  Labs Ordered and Resulted from Time of ED Arrival to Time of ED Departure   BASIC METABOLIC PANEL - Abnormal       Result Value    Creatinine 0.70      Sodium 131 (*)     Potassium 4.7      Urea Nitrogen 12.0      Chloride 93 (*)     Carbon Dioxide (CO2) 34 (*)     Anion Gap 4 (*)     Glucose 99      GFR Estimate 82      Calcium 8.4     CBC WITH PLATELETS AND DIFFERENTIAL - Abnormal    WBC Count 92.6 (*)     RBC Count 3.08 (*)     Hemoglobin 8.8 (*)     Hematocrit 30.6 (*)     MCV 99      MCH 28.6      MCHC 28.8 (*)     RDW 16.0 (*)     Platelet Count 74 (*)    DIFFERENTIAL - Abnormal    % Neutrophils 3      % Lymphocytes 97      % Monocytes 0      % Eosinophils 0      % Basophils 0      Absolute Neutrophils 2.8      Absolute Lymphocytes 89.8 (*)     Absolute Monocytes 0.0      Absolute Eosinophils 0.0      Absolute Basophils 0.0      RBC Morphology Confirmed RBC Indices      Platelet Assessment        Value: Automated Count Confirmed. Platelet morphology is normal.    Elliptocytes Slight (*)    COVID-19 VIRUS (CORONAVIRUS) BY PCR   INR     Emergency Department Course:       Reviewed:  I reviewed nursing notes, vitals, past medical history and Care Everywhere    Assessments:  1139 I obtained history and examined the patient as noted above.   1437 I rechecked the patient and explained findings.     Consults:  1333 I consulted with ortho regarding the patient's history and presentation in the emergency department today.   1431 I consulted with Dr. Wilkes, hospitalist, regarding the patient's history and presentation here in the emergency department who accepted the patient for admission.    Interventions:  Medications   fentaNYL (PF) (SUBLIMAZE) injection 50 mcg (50 mcg Intravenous Given 8/25/22 1340)   ketorolac (TORADOL) injection 15 mg (has no administration in time range)   acetaminophen (TYLENOL) tablet 1,000 mg (has no administration in time range)   HYDROmorphone (PF)  (DILAUDID) injection 0.3 mg (has no administration in time range)   hydrOXYzine (ATARAX) tablet 10 mg (has no administration in time range)   oxyCODONE (ROXICODONE) tablet 5 mg (5 mg Oral Given 8/25/22 1528)   acetaminophen (TYLENOL) tablet 650 mg (650 mg Oral Given 8/25/22 1527)     Disposition:  The patient was admitted to the hospital under the care of Dr. Wilkes, hospitalist.     Impression & Plan     Medical Decision Making:  Mechanical fall today resulting in hip fracture requiring surgical repair.  Okay to eat tonight.  NPO midnight for repair tomorrow.  Given multiple medications for pain control.  Admission arranged.  Labs and EKG for pre-op evaluation were reviewed.  WBC with baseline elevation from CLL; lower than last check.      Diagnosis:    ICD-10-CM    1. Closed fracture of neck of right femur, initial encounter (H)  S72.001A    2. CLL (chronic lymphocytic leukemia) (H)  C91.10      Scribe Disclosure:  Glory HANKS, am serving as a scribe at 11:23 AM on 8/25/2022 to document services personally performed by Sandi Mckeon MD based on my observations and the provider's statements to me.      Sandi Mckeon MD  08/27/22 3641

## 2022-08-25 NOTE — CONSULTS
Care Management  Note    Discharge Date:         Discharge Disposition: Transitional Care    Discharge Services: None    Discharge DME: None      Additional Information:  See ED note for SW consult.     DAVID Olmos, [facility]  Emergency Room   [phone]-Please contact the SW on the floor in which the patient is staying for any questions or concerns

## 2022-08-25 NOTE — TREATMENT PLAN
Orthopedics    Right femoral neck fracture    Patient scheduled for a right hip hemiarthroplasty tomorrow pending patient is optimized for surgery per hospitalist.    Surgeon: Dr Ferguson   NPO Status effective midnight   NWB/Bedrest until postop  Hold anticoagulants if taken PTA   Pain medication as needed, minimize narcotics as able

## 2022-08-25 NOTE — ED NOTES
Care Management Initial Consult    General Information  Assessment completed with: VM-chart review, Patient  Type of CM/SW Visit: Initial Assessment    Primary Care Provider verified and updated as needed: No   Readmission within the last 30 days:        Reason for Consult: discharge planning  Advance Care Planning: Advance Care Planning Reviewed: present on chart          Communication Assessment  Patient's communication style: spoken language (English or Bilingual)             Cognitive  Cognitive/Neuro/Behavioral: WDL                      Living Environment:   People in home: facility resident     Current living Arrangements: assisted living      Able to return to prior arrangements: yes       Family/Social Support:  Care provided by: self, homecare agency  Provides care for: no one, unable/limited ability to care for self     Children          Description of Support System: Supportive    Support Assessment: Adequate family and caregiver support    Current Resources:   Patient receiving home care services: No (Was supposed to start with [facility]- [phone]. Fax [phone] but they did not open with her since beting home from [facility]. Patient was supposed to get RN. OT. PT.)     Community Resources: None  Equipment currently used at home: walker, rolling, shower chair, grab bar, toilet, grab bar, tub/shower  Supplies currently used at home: None    Employment/Financial:  Employment Status: retired        Financial Concerns: No concerns identified   Referral to Financial Worker: No       Lifestyle & Psychosocial Needs:  Social Determinants of Health     Tobacco Use: Medium Risk     Smoking Tobacco Use: Former Smoker     Smokeless Tobacco Use: Never Used   Alcohol Use: Not on file   Financial Resource Strain: Low Risk      Difficulty of Paying Living Expenses: Not hard at all   Food Insecurity: No Food Insecurity     Worried About Running Out of Food in the Last Year: Never true     Ran Out of Food in the  Last Year: Never true   Transportation Needs: No Transportation Needs     Lack of Transportation (Medical): No     Lack of Transportation (Non-Medical): No   Physical Activity: Not on file   Stress: Not on file   Social Connections: Not on file   Intimate Partner Violence: Not At Risk     Fear of Current or Ex-Partner: No     Emotionally Abused: No     Physically Abused: No     Sexually Abused: No   Depression: Not at risk     PHQ-2 Score: 0   Housing Stability: Not on file       Functional Status:  Prior to admission patient needed assistance:   Dependent ADLs:: Independent  Dependent IADLs:: Shopping, Transportation  Assesssment of Functional Status: Not at baseline with ADL Functioning    Mental Health Status:  Mental Health Status: No Current Concerns       Chemical Dependency Status:  Chemical Dependency Status: No Current Concerns             Values/Beliefs:  Spiritual, Cultural Beliefs, Voodoo Practices, Values that affect care:                 Additional Information:  SW met with patient at bedside.     Patient was in this hospital 8/6/22-8/11/22 and then discharged to [facility] TCU until 8/22/22. Patient then discharged back to [facility] with [facility] P:[phone]. Fax: [phone]/. Patient gave SW permission to call [facility] and her home care. Home care reports they had not yet opened with her but she was supposed to get PT, OT, and RN. Home Care would like an update once patient moves out of the ED. They will also need a new referral if home care is recommended again.     Patient states she was completely independent at [facility]. Previous notes indicated she gets assistance with bathing and one meal.  called with patient permission to verify, but could not reach anyone. Daughter reports patient gets safety checks, vitals, a meal a day, weekly bathing, cleaning once a week. Patient's family usually transports her. [facility] P: [phone].     Patient is on 2 L home O2 but 4L  "when out of her apartment. Patient does not know the company she uses. Patients daughter states [facility] for home O2.      Per chart review, patient's SLUMS was 16/30 in August 2022.     SW discussed potential discharge options with patient. She would like to go back to [facility] if TCU is recommended. SW attempted to explain insurance coverage, but patient stated multiple times that she \"is in pain.\" Discussion on insurance may need to be done again when pain is controlled.     Patient gave permission for SW to call daughter.     DAVID Olmos, [facility]  Emergency Room   [phone]-Please contact the SW on the floor in which the patient is staying for any questions or concerns      "

## 2022-08-25 NOTE — H&P
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    She was just hospitalized at this facility 8/6-8/11/2022 due to acute on chronic hypoxic respiratory failure from an acute COPD exacerbation.  This was complicated by generalized weakness and so discharged to TCU.  She was discharged from TCU back to independent living at [facility] on 8/22.    She lives in her own apt and gets around with a walker.  Today while getting up to go and eat breakfast she suddenly lost her balance and fell on her right hip.  She denies any head trauma or loss of consciousness and states it was just a fall    She's been in her usual state of health and denies any cp/palpitations/change in chronic SOB or cough/no dysuria,urgency, fcy/no n,v,d, or abdominal pain/no nc,st,ea.  She denies any issues with anesthetic reactions or bleeding tendencies.     ER VSS, BMP stable, CBC stable, CXR stable, Right hip/pelvic XRay impacted and displaced femoral neck fracture.     I am asked to admit her to med/surg for surgical repair in the am     Problem List:   Mechanical Fall complicated by femoral neck fx  Are having some difficulty with pain control downstairs.    -start apap 1g tid, judicious use of prn ketorolac.    -discontinued fentanyl and started  hydromorphone 0.2 mg q 2 prn, cont oxycodone but will add in hydroxyzine to boost it's effects  -ortho consultation for surgical repair NPO p MN     Pre-Op  Denies hx of any anesthetic reactions or bleeding problems, not on any anticoagulants x baby asa.  No significant cardiac RF, but chronic hypoxic respiratory failure/age/malnutrition does increase risk of perioperative complications.  No s/s of infection.  No reversible factors identified   -check EKG/INR for baseline    COPD  Chronic hypoxic respiratory failure on continuous O2 2L/NC  Chronic junky sounding cough, no infiltrate on CXR.  She was just hospitalized earlier this month for an acute exacerbation and continues on a prednisone wean-has 3 more days of 20 mg prior to decreasing to 10 mg  -she meets criteria for stress dose steroids as has been on pred 20 mg for the past 20 days (UTD states 3 weeks which will be tomorrow when her surgery is) so will give hydrocortisone-50 mg IV prior to procedure then 20 mg IV q 8 x 3, will continue with the prednisone wean at the same time.   -cont with supplemental O2  -duonebs qid and albuterol nebs prn   -resumed budesonide    CLL with chronic leukocytosis with anemia and thrombocytopenia  Her WBC is quite variable  range.  Currently around 100.  Thrombocytopenia also variable 50-15's, currently mid seventies  Anemia range 8-10 currently at 8.8    Hx of takosubo CM with ER 30-35 % range, resolved by echo 3/2021 EF 60-65%  E/o dd  htn  Resumed pta metoprolol xl 25 mg bid with hold parameters.  Also on furosemide 20 mg q S-M-W-F-S, which can be resumed postop     Dysphagia-mild  Was on DD3at TCU but reports eats a reg texture diet at home  -will do mechanical soft with thin liquids here    Chronic hyponatremia stable and clinically insignificant.  Likely due to chronic lung process    Cognitive impairment-previous slums 16/30  Pleasant here and oriented to situation.  On review of prior hospitalizations no e/o  "delirium so I suspect overall low risk     Anxiety/depression   Resumed pta escitalopram 10/trazodone 50 mg qhs    Severe malnutrition  In the context of chronic dz, \"COPD Cachexia\"    COVID 19   S/p 3 shot moderna series 11/2021    DVT Prophylaxis: Pneumatic Compression Devices  Code Status: DNR / DNI  Functional Status: lives in independent living at [facility], gets around by walker   Rees: not needed  Access:   PIV                Chief Complaint:     Fall complicated by femoral neck fx       History of Present Illness:   Marie Kline is a 89 year old female with a history of CLL with chronic leukocytosis  range with associated anemia/thrombocytopenia, hx of takosubo CM with EF 30% and cath with minimal CAD in 2018 now resolved with most recent echo in 3/2021 showing EF 60-65 % with evidence of dd, COPD on chronic hypoxic respiratory failure on 2l/NC, htn, hx of bladder cancer s/p TURBT, hypothyroidism, mild dysphagia on DD3, cognitive impairment, admitted on 8/25/2022 with a mechanical fall complicated by femoral neck fx.    She was just hospitalized at this facility 8/6-8/11/2022 due to acute on chronic hypoxic respiratory failure from an acute COPD exacerbation.  This was complicated by generalized weakness and so discharged to TCU.  She was discharged from TCU back to independent living at [facility] on 8/22.    She lives in her own apt and gets around with a walker.  Today while getting up to go and eat breakfast she suddenly lost her balance and fell on her right hip.  She denies any head trauma or loss of consciousness and states it was just a fall    She's been in her usual state of health and denies any cp/palpitations/change in chronic SOB or cough/no dysuria,urgency, fcy/no n,v,d, or abdominal pain/no nc,st,ea.  She denies any issues with anesthetic reactions or bleeding tendencies.     ER VSS, BMP stable, CBC stable, CXR stable, Right hip/pelvic XRay impacted and displaced femoral neck " fracture.     I am asked to admit her to med/surg for surgical repair in the am     The history is obtained in discussion with the ER provider Dr Sandi Mckeon, and the patient with fair reliability        Epic and Care everywhere were extensively reviewed        Past Medical History:     Past Medical History:   Diagnosis Date     Arthritis     Generalized     Bladder cancer (H)      CLL (chronic lymphocytic leukemia) (H)      Cognitive impairment     16/30 slums     Congestive heart failure (H) 10/24/2014    flash pulm edema ass w/Takotsubo     COPD (chronic obstructive pulmonary disease) (H)     chronic O2 2L/NC     Dysphagia     on DD3     Hypertension      Hypothyroid      Mumps      Pulmonary edema cardiac cause (H) 10/08/2014    associated w/Takotsubo ACS     Stress-induced cardiomyopathy 10/2018    cath with minimal dz,  EF 30-35 %improved to 60-65 % by echo 3/2021     Tricuspid valve disorders, 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    TR 2-3+ per echo             Past Surgical History:     Past Surgical History:   Procedure Laterality Date     BIOPSY LYMPH NODE INGUINAL Right 10/23/2018    Procedure: excsional biopsy right inguinal lymph node;  Surgeon: Reji Connors MD;  Location: [facility] OR     BLADDER SURGERY       CORONARY ANGIOGRAPHY ADULT ORDER  10/2014    minimal CAD     CV LEFT HEART CATH N/A 12/11/2018    Procedure: Left Heart Cath;  Surgeon: Sadiq Carrasco MD;  Location:  HEART CARDIAC CATH LAB     CYSTOSCOPY       CYSTOSCOPY, BIOPSY BLADDER, COMBINED N/A 2/9/2016    Procedure: COMBINED CYSTOSCOPY, BIOPSY BLADDER;  Surgeon: Kar Nuñez MD;  Location:  OR     CYSTOSCOPY, TRANSURETHRAL RESECTION (TUR) TUMOR BLADDER, COMBINED N/A 2/9/2016    Procedure: COMBINED CYSTOSCOPY, TRANSURETHRAL RESECTION (TUR) TUMOR BLADDER;  Surgeon: Kar Nuñez MD;  Location:  OR     ESOPHAGOSCOPY, GASTROSCOPY, DUODENOSCOPY (EGD), COMBINED N/A 6/22/2016    Procedure: COMBINED ESOPHAGOSCOPY,  GASTROSCOPY, DUODENOSCOPY (EGD);  Surgeon: Freddie Diez MD;  Location: [facility] GI     OPEN REDUCTION INTERNAL FIXATION HIP BIPOLAR Left 2018    Procedure: Left bipolar hemiarthroplasty;  Surgeon: Scar Reynolds MD;  Location: [facility] OR             Social History:     Social History     Tobacco Use     Smoking status: Former Smoker     Types: Cigarettes     Quit date: 2/3/1996     Years since quittin.5     Smokeless tobacco: Never Used   Substance Use Topics     Alcohol use: No     Alcohol/week: 0.0 standard drinks   Code Status: DNR / DNI  Functional Status: lives in independent living at [facility], gets around by walker           Family History:     Family History   Problem Relation Age of Onset     Cerebrovascular Disease Mother      Cancer Father             Allergies:     Allergies   Allergen Reactions     Diagnostic X-Ray Materials Hives     CT DYE     Contrast Dye Hives     CT DYE     Prolia [Denosumab]      Osteonecrosis jaw       Rocephin [Ceftriaxone] Itching     Wound Dressing Adhesive              Medications:     Prior to Admission medications    Medication Sig Last Dose Taking? Auth Provider Long Term End Date   acetaminophen (TYLENOL) 325 MG tablet Take 2 tablets (650 mg) by mouth every 6 hours as needed for mild pain or other (and adjunct with moderate or severe pain or per patient request)  Yes Lokesh Slaughter MD     albuterol (PROAIR HFA/PROVENTIL HFA/VENTOLIN HFA) 108 (90 Base) MCG/ACT inhaler Inhale 2 puffs into the lungs every 6 hours  Patient taking differently: Inhale 2 puffs into the lungs every 6 hours as needed 2022 at Unknown time Yes Marva Powell MD Yes    aspirin 81 MG EC tablet Take 81 mg by mouth every evening  2022 at Unknown time Yes Unknown, Entered By History No    budesonide (PULMICORT) 0.5 MG/2ML neb solution Take 2 mLs (0.5 mg) by nebulization 2 times daily  Yes Marva Powell MD Yes    escitalopram (LEXAPRO) 10 MG tablet Take 1  tablet (10 mg) by mouth daily 8/25/2022 at Unknown time Yes Be Dumont MD Yes    ferrous sulfate (IRON) 325 (65 FE) MG tablet Take 325 mg by mouth daily (with breakfast)  8/25/2022 at Unknown time Yes Reported, Patient     furosemide (LASIX) 20 MG tablet Take 20 mg by mouth daily 8/25/2022 at Unknown time Yes Unknown, Entered By History Yes    levothyroxine (SYNTHROID/LEVOTHROID) 88 MCG tablet Take 1 tablet (88 mcg) by mouth daily 8/25/2022 at Unknown time Yes Sis Brock PA-C Yes    loperamide (IMODIUM A-D) 2 MG tablet Take 2 mg by mouth every 4 hours as needed for diarrhea  Yes Unknown, Entered By History     metoprolol succinate ER (TOPROL XL) 25 MG 24 hr tablet Take 1 tablet (25 mg) by mouth daily 8/25/2022 at Unknown time Yes Be Dumont MD Yes    Multiple Vitamins-Minerals (MULTIVITAMIN ADULT) CHEW Take 2 chew tab by mouth daily 8/25/2022 at Unknown time Yes Reported, Patient     predniSONE (DELTASONE) 10 MG tablet Take 20 mg by mouth daily For 3 days, 8/25/22-8/27/22  Yes Unknown, Entered By History     predniSONE (DELTASONE) 10 MG tablet Take 10 mg by mouth daily For 5 days 8/28-9/1/22  Yes Unknown, Entered By History     Revefenacin 175 MCG/3ML SOLN Inhale 3 mLs (175 mcg) into the lungs daily 8/25/2022 at Unknown time Yes Lokesh Slaughter MD     traZODone (DESYREL) 50 MG tablet Take 1 tablet (50 mg) by mouth At Bedtime 8/24/2022 at Unknown time Yes Be Dumont MD Yes    ACE/ARB/ARNI NOT PRESCRIBED (INTENTIONAL) Please choose reason not prescribed from choices below.   Nehal Freeman, CNP Yes    Immune Globulin, Human, (OCTAGAM) 5 GM/100ML SOLN into the vein every 6 weeks.    Hold this medication while in TCU-resume at discharge from TCU  at due again 8/30  Abstract, Provider               Review of Systems:     A Comprehensive greater than 10 system review of systems was carried out.  Pertinent positives and negatives are noted above.  Otherwise negative for  contributory information.           Physical Exam:   Blood pressure 100/51, pulse 58, temperature 98  F (36.7  C), temperature source Temporal, resp. rate 20, SpO2 96 %, not currently breastfeeding.  Exam:    General:  Pleasant nad looks stated age cachectic with temporal wasting. Moderate amount of pain   HEENT:  Head nc/at sclera clear PERRL O/P:  Mask in place  Neck is supple  Lungs: cta b nl effort   CV:  RRR no m/r/g no le edema  Abd:  S/nt/nd no r/g  Neuro:  Cn 2-12 grossly intact and fernández  Alert and oriented affect appropriate   Skin:  W/d no c/c               Data:          Lab Results   Component Value Date     08/25/2022     07/07/2021    Lab Results   Component Value Date    CHLORIDE 93 08/25/2022    CHLORIDE 99 05/16/2022    CHLORIDE 102 07/07/2021    Lab Results   Component Value Date    BUN 12.0 08/25/2022    BUN 15 05/16/2022    BUN 22 07/07/2021      Lab Results   Component Value Date    POTASSIUM 4.7 08/25/2022    POTASSIUM 4.3 05/16/2022    POTASSIUM 3.9 07/07/2021    Lab Results   Component Value Date    CO2 34 08/25/2022    CO2 30 05/16/2022    CO2 35 07/07/2021    Lab Results   Component Value Date    CR 0.70 08/25/2022    CR 0.78 07/07/2021        Lab Results   Component Value Date    WBC 92.6 (HH) 08/25/2022    HGB 8.8 (L) 08/25/2022    HCT 30.6 (L) 08/25/2022    MCV 99 08/25/2022    PLT 74 (L) 08/25/2022     Lab Results   Component Value Date    GLC 99 08/25/2022            Imaging:     Recent Results (from the past 24 hour(s))   XR Pelvis w Hip Right 1 View    Narrative    PELVIS AND RIGHT HIP, ONE VIEW   8/25/2022 1:20 PM     HISTORY:  Pain, fall.    COMPARISON: None.      Impression    IMPRESSION: There is a right femoral neck fracture. There is 2 to 3 cm  of displacement and impaction. Moderate apex anterior and  superolateral angulation. Bipolar left hip hemiarthroplasty  incidentally noted. Arterial calcifications and degenerative changes  in the lumbar spine incidentally  noted.    ELAINA BERUMEN MD         SYSTEM ID:  [id number]   XR Chest 1 View    Narrative    XR CHEST 1 VIEW   8/25/2022 1:22 PM     HISTORY: fall, suspected hip fx, preop planning    COMPARISON: 8/6/2022.      Impression    IMPRESSION: Hyperinflation consistent with COPD. Linear mild scarring  in the left lower lung. No acute airspace opacity, pleural effusion or  pneumothorax. Normal heart size. Aortic calcifications. Old right  posterior rib fracture. S-shaped thoracolumbar curve with degenerative  changes.    GEORGIA PAIGE MD         SYSTEM ID:  [id number]

## 2022-08-25 NOTE — PHARMACY-ADMISSION MEDICATION HISTORY
Admission medication history interview status for this patient is complete. See [facility] admission navigator for allergy information, prior to admission medications and immunization status.     Medication history interview source(s):Patient and facility med list  Medication history resources (including written lists, pill bottles, clinic record):sure scripts, Epic list, facility med list  Primary [facility]    Changes made to PTA medication list:  Added: loperamide, prednisone entry x 2  Deleted: old prednisone taper  Changed: furosemide from 5 x weekly to daily per pt and med list    Actions taken by pharmacist (provider contacted, etc):None     Additional medication history information:None, pt thinks next IVIG due 8/30    Medication reconciliation/reorder completed by provider prior to medication history? No      For patients on insulin therapy:N    Prior to Admission medications    Medication Sig Last Dose Taking? Auth Provider Long Term End Date   acetaminophen (TYLENOL) 325 MG tablet Take 2 tablets (650 mg) by mouth every 6 hours as needed for mild pain or other (and adjunct with moderate or severe pain or per patient request)  Yes Lokesh Slaughter MD     albuterol (PROAIR HFA/PROVENTIL HFA/VENTOLIN HFA) 108 (90 Base) MCG/ACT inhaler Inhale 2 puffs into the lungs every 6 hours  Patient taking differently: Inhale 2 puffs into the lungs every 6 hours as needed 8/24/2022 at Unknown time Yes Marva Powell MD Yes    aspirin 81 MG EC tablet Take 81 mg by mouth every evening  8/24/2022 at Unknown time Yes Unknown, Entered By History No    budesonide (PULMICORT) 0.5 MG/2ML neb solution Take 2 mLs (0.5 mg) by nebulization 2 times daily  Yes Marva Powell MD Yes    escitalopram (LEXAPRO) 10 MG tablet Take 1 tablet (10 mg) by mouth daily 8/25/2022 at Unknown time Yes Be Dumont MD Yes    ferrous sulfate (IRON) 325 (65 FE) MG tablet Take 325 mg by mouth daily (with breakfast)   8/25/2022 at Unknown time Yes Reported, Patient     furosemide (LASIX) 20 MG tablet Take 20 mg by mouth daily 8/25/2022 at Unknown time Yes Unknown, Entered By History Yes    levothyroxine (SYNTHROID/LEVOTHROID) 88 MCG tablet Take 1 tablet (88 mcg) by mouth daily 8/25/2022 at Unknown time Yes Sis Brock PA-C Yes    loperamide (IMODIUM A-D) 2 MG tablet Take 2 mg by mouth every 4 hours as needed for diarrhea  Yes Unknown, Entered By History     metoprolol succinate ER (TOPROL XL) 25 MG 24 hr tablet Take 1 tablet (25 mg) by mouth daily 8/25/2022 at Unknown time Yes Be Dumont MD Yes    Multiple Vitamins-Minerals (MULTIVITAMIN ADULT) CHEW Take 2 chew tab by mouth daily 8/25/2022 at Unknown time Yes Reported, Patient     predniSONE (DELTASONE) 10 MG tablet Take 20 mg by mouth daily For 3 days, 8/25/22-8/27/22  Yes Unknown, Entered By History     predniSONE (DELTASONE) 10 MG tablet Take 10 mg by mouth daily For 5 days 8/28-9/1/22  Yes Unknown, Entered By History     Revefenacin 175 MCG/3ML SOLN Inhale 3 mLs (175 mcg) into the lungs daily 8/25/2022 at Unknown time Yes Lokesh Slaughter MD     traZODone (DESYREL) 50 MG tablet Take 1 tablet (50 mg) by mouth At Bedtime 8/24/2022 at Unknown time Yes Be Dumont MD Yes    ACE/ARB/ARNI NOT PRESCRIBED (INTENTIONAL) Please choose reason not prescribed from choices below.   Nehal Freeman, CNP Yes    Immune Globulin, Human, (OCTAGAM) 5 GM/100ML SOLN into the vein every 6 weeks.    Hold this medication while in TCU-resume at discharge from TCU  at due again 8/30  Abstract, Provider

## 2022-08-25 NOTE — ED TRIAGE NOTES
Patient brought in by EMS from assisted living trip and fall today injured right hip. ABC intact alert and no distress. EMS gave Fentalyl 50 mcg IVP  NO LOC     Triage Assessment     Row Name 08/25/22 1126       Triage Assessment (Adult)    Airway WDL WDL       Skin Circulation/Temperature WDL    Skin Circulation/Temperature WDL WDL       Cognitive/Neuro/Behavioral WDL    Cognitive/Neuro/Behavioral WDL WDL

## 2022-08-26 NOTE — ANESTHESIA CARE TRANSFER NOTE
Patient: Marie Kline    Procedure: Procedure(s):  HEMIARTHROPLASTY RIGHT HIP       Diagnosis: Closed fracture of neck of right femur, initial encounter (H) [S72.001A]  Diagnosis Additional Information: No value filed.    Anesthesia Type:   General     Note:    Oropharynx: oral airway in place and spontaneously breathing  Level of Consciousness: awake and drowsy  Oxygen Supplementation: face mask  Level of Supplemental Oxygen (L/min / FiO2): 6 lpm  Independent Airway: airway patency satisfactory and stable  Dentition: dentition unchanged  Vital Signs Stable: post-procedure vital signs reviewed and stable  Report to RN Given: handoff report given  Patient transferred to: PACU  Comments: Patient oral suctioned. Patient with spontaneous respirations and adequate tidal volumes. Patient awake and responsive. Extubated in OR to 6L facemask. To PACU ventilating well. VSS. Report given.  Handoff Report: Identifed the Patient, Identified the Reponsible Provider, Reviewed the pertinent medical history, Discussed the surgical course, Reviewed Intra-OP anesthesia mangement and issues during anesthesia, Set expectations for post-procedure period and Allowed opportunity for questions and acknowledgement of understanding      Vitals:  Vitals Value Taken Time   /47 08/26/22 1115   Temp     Pulse 65 08/26/22 1117   Resp 11 08/26/22 1117   SpO2 100 % 08/26/22 1117   Vitals shown include unvalidated device data.    Electronically Signed By: NOHEMI Ann CRNA  August 26, 2022  11:19 AM

## 2022-08-26 NOTE — ANESTHESIA PREPROCEDURE EVALUATION
Anesthesia Pre-Procedure Evaluation    Patient: Marie Kline   MRN: [id number] : 1932        Procedure : Procedure(s):  HEMIARTHROPLASTY RIGHT HIP          Past Medical History:   Diagnosis Date     Arthritis     Generalized     Bladder cancer (H)      CLL (chronic lymphocytic leukemia) (H)      Cognitive impairment      slums     Congestive heart failure (H) 10/24/2014    flash pulm edema ass w/Takotsubo     COPD (chronic obstructive pulmonary disease) (H)     chronic O2 2L/NC     Dysphagia     on DD3     Hypertension      Hypothyroid      Mumps      Pulmonary edema cardiac cause (H) 10/08/2014    associated w/Takotsubo ACS     Stress-induced cardiomyopathy 10/2018    cath with minimal dz,  EF 30-35 %improved to 60-65 % by echo 3/2021     Tricuspid valve disorders, 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    TR 2-3+ per echo      Past Surgical History:   Procedure Laterality Date     BIOPSY LYMPH NODE INGUINAL Right 10/23/2018    Procedure: excsional biopsy right inguinal lymph node;  Surgeon: Reji Connors MD;  Location: [facility] OR     BLADDER SURGERY       CORONARY ANGIOGRAPHY ADULT ORDER  10/2014    minimal CAD     CV LEFT HEART CATH N/A 2018    Procedure: Left Heart Cath;  Surgeon: Sadiq Carrasco MD;  Location:  HEART CARDIAC CATH LAB     CYSTOSCOPY       CYSTOSCOPY, BIOPSY BLADDER, COMBINED N/A 2016    Procedure: COMBINED CYSTOSCOPY, BIOPSY BLADDER;  Surgeon: Kar Nuñez MD;  Location:  OR     CYSTOSCOPY, TRANSURETHRAL RESECTION (TUR) TUMOR BLADDER, COMBINED N/A 2016    Procedure: COMBINED CYSTOSCOPY, TRANSURETHRAL RESECTION (TUR) TUMOR BLADDER;  Surgeon: Kar Nuñez MD;  Location:  OR     ESOPHAGOSCOPY, GASTROSCOPY, DUODENOSCOPY (EGD), COMBINED N/A 2016    Procedure: COMBINED ESOPHAGOSCOPY, GASTROSCOPY, DUODENOSCOPY (EGD);  Surgeon: Freddie Diez MD;  Location:  GI     OPEN REDUCTION INTERNAL FIXATION HIP BIPOLAR Left 2018    Procedure:  Left bipolar hemiarthroplasty;  Surgeon: Scar Reynolds MD;  Location: [facility] OR      Allergies   Allergen Reactions     Diagnostic X-Ray Materials Hives     CT DYE     Contrast Dye Hives     CT DYE     Prolia [Denosumab]      Osteonecrosis jaw       Rocephin [Ceftriaxone] Itching     Wound Dressing Adhesive       Social History     Tobacco Use     Smoking status: Former Smoker     Types: Cigarettes     Quit date: 2/3/1996     Years since quittin.5     Smokeless tobacco: Never Used   Substance Use Topics     Alcohol use: No     Alcohol/week: 0.0 standard drinks      Wt Readings from Last 1 Encounters:   22 43.4 kg (95 lb 9.6 oz)        Anesthesia Evaluation            ROS/MED HX  ENT/Pulmonary:     (+) COPD,     Neurologic:  - neg neurologic ROS     Cardiovascular:     (+) hypertension--CAD ---CHF etiology: Takotsubo; resolved     METS/Exercise Tolerance:     Hematologic:  - neg hematologic  ROS     Musculoskeletal:   (+) arthritis, fracture,     GI/Hepatic:  - neg GI/hepatic ROS     Renal/Genitourinary:     (+) renal disease, type: CRI,     Endo:     (+) thyroid problem, hypothyroidism,     Psychiatric/Substance Use:     (+) psychiatric history depression     Infectious Disease:  - neg infectious disease ROS     Malignancy:       Other:            Physical Exam    Airway        Mallampati: II   TM distance: > 3 FB   Neck ROM: full   Mouth opening: > 3 cm    Respiratory Devices and Support         Dental       (+) missing      Cardiovascular   cardiovascular exam normal          Pulmonary   pulmonary exam normal                OUTSIDE LABS:  CBC:   Lab Results   Component Value Date    WBC 92.6 (HH) 2022    .8 (HH) 2022    HGB 8.8 (L) 2022    HGB 10.1 (L) 2022    HCT 30.6 (L) 2022    HCT 35.2 2022    PLT 74 (L) 2022     (L) 2022     BMP:   Lab Results   Component Value Date     (L) 2022     08/15/2022    POTASSIUM 4.7  08/25/2022    POTASSIUM 4.1 08/15/2022    CHLORIDE 93 (L) 08/25/2022    CHLORIDE 96 (L) 08/15/2022    CO2 34 (H) 08/25/2022    CO2 38 (H) 08/15/2022    BUN 12.0 08/25/2022    BUN 17.3 08/15/2022    CR 0.70 08/25/2022    CR 0.79 08/15/2022    GLC 99 08/25/2022    GLC 77 08/15/2022     COAGS:   Lab Results   Component Value Date    INR 1.07 08/25/2022     POC:   Lab Results   Component Value Date     (H) 12/12/2018     HEPATIC:   Lab Results   Component Value Date    ALBUMIN 3.9 05/16/2022    PROTTOTAL 6.7 (L) 05/16/2022    ALT 14 05/16/2022    AST 23 05/16/2022    ALKPHOS 116 05/16/2022    BILITOTAL 0.5 05/16/2022     OTHER:   Lab Results   Component Value Date    PH 7.46 (H) 06/13/2016    LACT 0.5 (L) 08/06/2022    A1C 5.6 06/17/2016    CARLOS 8.4 08/25/2022    PHOS 3.8 05/01/2019    MAG 1.9 08/11/2022    TSH 12.93 (H) 05/16/2022    T4 1.02 05/16/2022       Anesthesia Plan    ASA Status:  3      Anesthesia Type: General.     - Airway: ETT   Induction: Intravenous.   Maintenance: Balanced.        Consents    Anesthesia Plan(s) and associated risks, benefits, and realistic alternatives discussed. Questions answered and patient/representative(s) expressed understanding.    - Discussed:     - Discussed with:  Patient      - Extended Intubation/Ventilatory Support Discussed: No.      - Patient is DNR/DNI Status: No    Use of blood products discussed: No .     Postoperative Care    Pain management: Oral pain medications, IV analgesics, Multi-modal analgesia.   PONV prophylaxis: Ondansetron (or other 5HT-3), Dexamethasone or Solumedrol     Comments:                Randy Brock MD

## 2022-08-26 NOTE — ANESTHESIA POSTPROCEDURE EVALUATION
Patient: Marie Kline    Procedure: Procedure(s):  HEMIARTHROPLASTY RIGHT HIP       Anesthesia Type:  General    Note:  Disposition: Inpatient   Postop Pain Control: Uneventful            Sign Out: Well controlled pain   PONV: No   Neuro/Psych: Uneventful            Sign Out: Acceptable/Baseline neuro status   Airway/Respiratory: Uneventful            Sign Out: Acceptable/Baseline resp. status   CV/Hemodynamics: Uneventful            Sign Out: Acceptable CV status; No obvious hypovolemia; No obvious fluid overload   Other NRE: NONE   DID A NON-ROUTINE EVENT OCCUR? No           Last vitals:  Vitals Value Taken Time   BP 89/48 08/26/22 1230   Temp 99.5  F (37.5  C) 08/26/22 1115   Pulse 67 08/26/22 1236   Resp 21 08/26/22 1236   SpO2 97 % 08/26/22 1236   Vitals shown include unvalidated device data.    Electronically Signed By: Randy Brock MD  August 26, 2022  1:53 PM

## 2022-08-26 NOTE — OP NOTE
Procedure Date: 2022    PREOPERATIVE DIAGNOSIS:  Displaced right femoral neck fracture.    POSTOPERATIVE DIAGNOSIS:  Displaced right femoral neck fracture.    PROCEDURE PERFORMED:  Cemented hemiarthroplasty, right hip.    SURGEON:  Adonis Ferguson MD    ASSISTANT:  Holly Ge PA-C    ANESTHESIA:  General with local.    ESTIMATED BLOOD LOSS:  50 mL    COMPLICATIONS:  None.    DESCRIPTION OF PROCEDURE:  The patient was taken to the operating room where after administration of antibiotic prophylaxis and sterile prep and drape, as well as tranexamic acid, a posterior approach was used, splitting the fascia and reflecting the short external rotators and capsule as a sleeve, leaving the piriformis attached proximally to facilitate later capsular repair.  The hip was dislocated and the fracture removed.  The neck cut was freshened leaving a small defect in order to maintain neck length.  The socket was cleaned and sized.  A sequential reaming and broaching was performed.  After copious lavage and drying, a Wagner restrictor was placed followed by retrograde filling and pressurization of Simplex cement and placement of a Smith and Nephew size 11 fracture stem with a +0 x 43 unipolar head.  There was no instability in either direction and leg lengths appeared appropriate given this setting.  The wound was irrigated copiously, followed by a stout capsular closure followed by the remaining layered anatomic closure.  There were no complications.     Adonis Ferguson MD        D: 2022   T: 2022   MT: ALIYA    Name:     MAGGI RODRIGUEZ  MRN:      [id number]        Account:        [id number]   :      1932           Procedure Date: 2022     Document: [id number]

## 2022-08-26 NOTE — PLAN OF CARE
Goal Outcome Evaluation:    Plan of Care Reviewed With: patient     Overall Patient Progress: improving    A&O x4, forgetful. IV access on both L and R forearm. LR infusing on LPIV. RPIV SL. Lungs clear. Bowel sounds active, not passing gas. Dressing CDI. CMS intact.     Patient vital signs are at baseline: Yes; Monitor BP  Patient able to ambulate as they were prior to admission or with assist devices provided by therapies during their stay:  No,  Reason: Patient has not gotten up to ambulate after surgery. PT consult tomorrow AM. Gait belt in room but still needs a walker. Assist of 2.    Patient MUST void prior to discharge:  No,  Reason: Due to void. Pure wic in place.   Patient able to tolerate oral intake:  Yes  Pain has adequate pain control using Oral analgesics:  Yes  Does patient have an identified :  No. Progress in placement.   Has goal D/C date and time been discussed with patient: No.

## 2022-08-26 NOTE — PROGRESS NOTES
Clinic Care Coordination Contact  Ambulatory Care Coordination to Inpatient Care Management   Hand-In Communication    Date:  August 26, 2022  Name: Marie Kline is enrolled in Ambulatory Care Coordination program and I am the Lead Care Coordinator.  CC Contact Information: Epic InShowUhowsket + phone   Payor Source: Payor: MEDICARE / Plan: MEDICARE / Product Type: Medicare /   Current services in place:     Please see the CC Snaphot and Care Management Flowsheets for specific  details of this Marie Kline care plan.   Additional details/specific concerns r/t this admission:    No additional concerns at this time.    I will follow this admission in Epic. Please feel free to contact me with questions or for further collaboration in discharge planning.    DAVID Nuñez/LICSW  Social Work Care Coordinator  [facility]  Phone: [phone]

## 2022-08-26 NOTE — PROGRESS NOTES
Pt is alert and oriented, VS at baseline, on 2L O2, sats 95%, was given scheduled Tylenol for pain management. Pt on bed rest, the purewick in place, passing flatus, denied numbness/tingling, NPO as from midnight, scheduled for surgery tomorrow morning.

## 2022-08-26 NOTE — PROGRESS NOTES
Pt is alert and oriented, on 2L of O2, bedrest, lung sounds diminished, NPO, Pain controlled with prn Tylenol and dilaudid,cms intact. Bilateral lower extremity bruising. Voiding via pure wick.Bath given, Plan for surgery today at 0950am.

## 2022-08-26 NOTE — PROGRESS NOTES
Clinical Product Navigator RN reviewed chart; patient on payer product coverage.  Review results: patient readmitted to hospital within 3 days of TCU discharge; lead [facility] has already completed inpatient CM hand in and will monitor admission in [facility].     No further outreach or referral initiated at this time.     Paola Borja RN/Clinical Product Navigator

## 2022-08-26 NOTE — CONSULTS
Consult Date: 2022    CHIEF COMPLAINT:  Fall.    HISTORY OF PRESENT ILLNESS:  The patient is a 89-year-old female with chronic leukemia and cardiomyopathy, as well as coronary artery disease, who fell sustaining a right femoral neck fracture.  Orthopedic evaluation requested by Dr. Wilkes.  She describes no other complaints or problems.  She does ambulate with a walker.  For the remaining history, please see her written medical record.    PHYSICAL EXAMINATION:  Exam shows pain with rotation of the right hip.  Skin perfusion are normal.  There are venous stasis changes of the distal leg.  She is able to flex and extend her foot, and there is no knee effusion.  General appearance and affect are normal.  Respirations are normal, and heart rate is normal.  Her abdomen is soft.  There is minimal distal lymphedema.     RADIOGRAPHIC EVALUATION:  X-rays demonstrated displaced femoral neck fracture.    RECOMMENDATION:  I recommend proceeding with cemented unipolar hemiarthroplasty.  She has had this done on her contralateral side.  Risks, benefits, complications, postoperative course were reviewed with the patient and her daughter by phone.  Surgery will be performed today.    Adonis Ferguson MD        D: 2022   T: 2022   MT: FABIAN    Name:     MAGGI RODRIGUEZ  MRN:      [id number]        Account:      [id number]   :      1932           Consult Date: 2022     Document: [id number]

## 2022-08-26 NOTE — PROGRESS NOTES
"[facility]    Medicine Progress Note - Hospitalist Service    Date of Admission:  8/25/2022    Assessment & Plan        Summary: Marie Kline is a 89-year-old woman who resides in assisted living and had just returned from a transitional care unit on 8/22 who presented to the emergency department on 8/25/2022 following a mechanical fall.  Evaluation in the emergency department reveal a right femoral neck fracture.    Relevant past medical history is notable for CLL with WBC range ,000, COPD with chronic hypoxic respiratory failure for which she is on 2 L/min on nasal cannula, essential hypertension, dysphagia and cognitive impairment.  Remote history of Takotsubo cardiomyopathy that has resolved based on echocardiogram from 3/21.    The patient was initially admitted to medical bed and was taken to the operating room this morning 8/26.  Total OR time about 1 hr, 20 minutes under GETA and was uncomplicated. Total crystalloid 700 ml,     When I checked on the patient this afternoon, she was eating lunch and was denying pain.  She was mildly forgetful but pleasantly interactive.    Diagnoses:  1.  POD #0 status post operative repair of right femoral neck fracture.  She did very well with the procedure.  2.  COPD with chronic hypoxic respiratory failure.  Continues currently on a prednisone wean that was started during recent hospitalization.  3.  Chronic lymphocytic leukemia with chronic leukocytosis, anemia and thrombocytopenia.  4.  Mild dysphagia for which the patient is on a DD3 diet at TCU.  Notably, the patient previously has been on a regular diet at home.  5.  Chronic, mild hyponatremia.  Felt to be nonsignificant at this time.  6.  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    Plan:  1.  Avoid anticoagulants.  Monitor for active bleeding anemia.  2.  Continue home medications.  3.  Monitor sodium.  4.  The admitting physician gave the patient \"stress dose steroids\" at the " same time as prednisone 20 mg daily was continued.  I did not alter that plan.       Diet: NPO per Anesthesia Guidelines for Procedure/Surgery Except for: Meds    DVT Prophylaxis: PCD's  Rees Catheter: Not present  Central Lines: None  Cardiac Monitoring: None  Code Status: No CPR- Do NOT Intubate      Disposition Plan      Expected Discharge Date: 08/27/2022,  3:00 PM    Destination: inpatient rehabilitation facility          The patient's care was discussed with the Patient.    Paul Yepez MD  Hospitalist Service  [facility]  Securely message with the Vocera Web Console (learn more here)  Text page via Vicarious Paging/Directory         Clinically Significant Risk Factors Present on Admission         # Hyponatremia: Na = 131 mmol/L (Ref range: 136 - 145 mmol/L) on admission, will monitor as appropriate        # Thrombocytopenia: Plts = 74 10e3/uL (Ref range: 150 - 450 10e3/uL) on admission, will monitor for bleeding  # Hypertension: home medication list includes antihypertensive(s)          ______________________________________________________________________    Interval History   Chart reviewed, pt interviewed.    Denies pain.  Eating well.  Interactive.      Data reviewed today: I reviewed all medications, new labs and imaging results over the last 24 hours. I personally reviewed x-rays from admission.    Physical Exam   Vital Signs: Temp: 98.5  F (36.9  C) Temp src: Temporal BP: 117/44 Pulse: 57   Resp: 18 SpO2: 96 % O2 Device: Nasal cannula Oxygen Delivery: 2 LPM  Weight: 0 lbs 0 oz  Constitutional: awake, alert, cooperative, no apparent distress, and appears stated age.  The patient is extremely thin with temporal wasting noted.    Eyes: extra-ocular muscles intact, sclera clear and conjunctiva normal  Respiratory: No increased work of breathing, decreased air exchange, no obvious wheeze or rales.  Prolonged expiratory phase.  Cardiovascular: regular rate and rhythm and no murmur  noted  GI: Nontender.  No palpable masses.  Extremely thin.  Musculoskeletal/neurologic: Distal perfusion is intact.  Patient is able to move both feet without evident pain.  no lower extremity pitting edema present  there is no redness, warmth, or swelling of the joints    Data   Recent Labs   Lab 08/26/22  1226 08/26/22  1211 08/25/22  1615 08/25/22  1223 08/22/22  0934   WBC  --  86.3*  --  92.6* 201.8*   HGB  --  8.6*  --  8.8* 10.1*   MCV  --  101*  --  99 102*   PLT  --  71*  --  74* 142*   INR  --   --  1.07  --   --    *  --   --  131*  --    POTASSIUM 5.0  --   --  4.7  --    CHLORIDE 93*  --   --  93*  --    CO2 33*  --   --  34*  --    BUN 12.3  --   --  12.0  --    CR 0.69  --   --  0.70  --    ANIONGAP 2*  --   --  4*  --    CARLOS 8.5  --   --  8.4  --    *  --   --  99  --    ALBUMIN 3.3*  --   --   --   --    PROTTOTAL 5.0*  --   --   --   --    BILITOTAL 0.3  --   --   --   --    ALKPHOS 81  --   --   --   --    ALT 13  --   --   --   --    AST 32  --   --   --   --      Recent Results (from the past 24 hour(s))   XR Pelvis w Hip Port Right 1 View    Narrative    XR PELVIS AND HIP PORTABLE RIGHT 1 VIEW 8/26/2022 11:52 AM     HISTORY: Status post Hip surgery    COMPARISON: 8/25/2022      Impression    IMPRESSION: Bipolar right hip arthroplasty. Postoperative air is  present. Skin staples are in place. Findings are new. No fractures are  evident. Bipolar left hip arthroplasty. Osteopenia.    BLAINE BURCIAGA MD         SYSTEM ID:  [id number]

## 2022-08-26 NOTE — ANESTHESIA PROCEDURE NOTES
Airway       Patient location during procedure: OR       Procedure Start/Stop Times: 8/26/2022 9:59 AM  Staff -        Anesthesiologist:  Randy Brock MD       CRNA: Rita Meyer APRN CRNA       Performed By: CRNA  Consent for Airway        Urgency: elective  Indications and Patient Condition       Indications for airway management: silvio-procedural       Induction type:intravenous       Mask difficulty assessment: 1 - vent by mask    Final Airway Details       Final airway type: endotracheal airway       Successful airway: ETT - single  Endotracheal Airway Details        ETT size (mm): 6.5       Cuffed: yes       Cuff volume (mL): 4       Successful intubation technique: direct laryngoscopy       DL Blade Type: Conner 2       Grade View of Cords: 1       Adjucts: stylet       Position: Right       Measured from: lips       Secured at (cm): 20       Bite block used: None    Post intubation assessment        Placement verified by: capnometry, equal breath sounds and chest rise        Number of attempts at approach: 1       Number of other approaches attempted: 0       Secured with: other (comment) (silicone tape)       Ease of procedure: easy       Dentition: Intact and Unchanged    Medication(s) Administered   Medication Administration Time: 8/26/2022 9:59 AM

## 2022-08-27 NOTE — PLAN OF CARE
Goal Outcome Evaluation:  Vital signs stable, pt on 2 lpm nasal cannula with capnography monitoring.  Loose productive cough at times, on neb treatments.  Encouraged inspirometer use.  Skin carleen, bruising to upper arms and shins.  Dressing intact to right hip.  CMS intact.  Pt stood at bedside with walker, gait belt and 2 assist.  No urge to void, bladder scanned for 275 mls@9.40 pm. Encouraged po fluid intake.  Pain controlled with tylenol, vistaril.  Plan of Care Reviewed With: patient

## 2022-08-27 NOTE — PLAN OF CARE
Day RN ([phone])     Patient vital signs are at baseline: Yes  Patient able to ambulate as they were prior to admission or with assist devices provided by therapies during their stay:  Yes  Patient MUST void prior to discharge:  Yes - however still having some PVR's - last was 208ml.  Patient able to tolerate oral intake:  Yes  Pain has adequate pain control using Oral analgesics:  Yes  Does patient have an identified :  Yes  Has goal D/C date and time been discussed with patient:  Yes     Pt A/O x4 -  a bit forgetful.  [place].  VSS and afebrile - 2l oxygen as per baseline.  Pt denied pain - scheduled PO Tylenol and ice in use.  CMS intact.  Dressing CDI.  Up A1 with walker and gait belt.  Voiding with some residual PVR as noted above.  Tolerating regular diet well.  Plan is return to [facility] facility with home cares vs TCU on discharge - SW involved.  Will continue to monitor.

## 2022-08-27 NOTE — PROGRESS NOTES
No complaints today  Afebrile;  UO adequate;  VSS  Exam:  DF intact  Dressing dry, wound benign  PLAN:   1) PT   2) Anticoagulation w ASA with 81 mg due to CLL   3) D/C at POD #1-2

## 2022-08-27 NOTE — PLAN OF CARE
Goal Outcome Evaluation:    Patient vital signs are at baseline: Yes  Patient able to ambulate as they were prior to admission or with assist devices provided by therapies during their stay:  Yes  Patient MUST void prior to discharge:  Yes  Patient able to tolerate oral intake:  Yes  Pain has adequate pain control using Oral analgesics:  Yes  Does patient have an identified :  Yes  Has goal D/C date and time been discussed with patient:  Yes      Pt A/Ox4, VSS on 3L O2 NC. Denies pain at this time, well controlled with scheduled tylenol. CMS intact. R hip incision dressing CDI, minimal swelling, ice applied with relief per pt. R PIV SL, L PIV IVF LR @ 100 ml/hr. Lung sounds diminished, loose, productive cough occasionally. Up A2 WGB. Encouraging fluids, poor appetite per pt. PW in place with minimal urine output. Discharge tomorrow pending stable condition, perhaps to TCU. CTM and provide supportive cares.    Vital signs:  Temp: 98.2  F (36.8  C) Temp src: Temporal BP: 118/53 Pulse: 83   Resp: 20 SpO2: 99 % O2 Device: Nasal cannula Oxygen Delivery: 3 LPM

## 2022-08-27 NOTE — PROGRESS NOTES
[facility]  Hospitalist Progress Note  David Ames,  08/27/22       Reason for Stay (Diagnosis): Mechanical fall cb hip fracture         Assessment and Plan:      Summary of Stay: Marie Kline is a 89 year old female PMH notable for CLL with WBC ranges from 50,000 150,000, COPD with chronic hypoxic respiratory failure on 2 L nasal cannula, essential hypertension, dysphagia, cognitive impairment, remote history of Takotsubo cardiomyopathy which is resolved who is admitted on 8/25/2022 with mechanical fall complicated by right femoral neck fracture.    Orthopedic surgery was consulted and the patient is now status post surgical fix.  She is working with physical therapy who have cleared her for discharge home with assist, home with physical therapy.  However, it is unclear if patient has 24-hour assist as she comes from an independent [facility].  Social work consulted.      Problem List/Assessment and Plan:   Mechanical fall complicated by right femoral neck fracture s/p surgical fix:  Presents with right leg pain.  Orthopedic surgery was consulted and the patient is now status post surgical fix.  Operative course was uncomplicated.  Patient has been evaluated physical therapy who are recommending home with assist, home with physical therapy.  Patient historically resides at an assisted living facility but is independent.  Left ensure that she has 24-hour assist prior to discharge.  Social work is consulted.    COPD with chronic hypoxemic respiratory failure:  Patient is chronically on 2 L nasal cannula.  She is currently on a prednisone taper that was started during her recent hospitalization.  This is being continued while here.    CLL with chronic leukocytosis, anemia, thrombocytopenia:  WBC typically ranges from 50,000 150,000.  She is within that range here.    Mild dysphagia:  Historically on DD3 diet at [facility].  Is also report that she uses regular diet at home.  It appears that she is tolerating  "regular diet here.    Chronic mild hyponatremia:  Sodium level 128 on 8/26.  This is lower than her historic baseline.  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 intake.      DVT Prophylaxis: Aspirin 81 mg daily after discussion with orthopedic surgery  Code Status: Full Code  Discharge Dispo/Date: Anticipate discharge in 1 to 2 days pending ability to find safe dispo.        Interval History (Subjective):      Patient is feeling okay.  She denies any significant pain in her hip.  She denies any acute events overnight.  She reports that she is feeling well.  I answered her questions.  He is hoping to go home.                  Physical Exam:      Last Vital Signs:  /49 (BP Location: Right arm, Patient Position: Semi-Brooks's)   Pulse 74   Temp 98.2  F (36.8  C) (Temporal)   Resp 18   Ht 1.499 m (4' 11\")   Wt 38.1 kg (84 lb)   LMP  (LMP Unknown)   SpO2 96%   BMI 16.97 kg/m        Intake/Output Summary (Last 24 hours) at 8/27/2022 1437  Last data filed at 8/27/2022 1330  Gross per 24 hour   Intake 1685 ml   Output 650 ml   Net 1035 ml       General: Alert, awake, no acute distress.  HEENT: NC/AT, eyes anicteric, external occular movements intact, face symmetric.    Cardiac: RRR, S1, S2.  No murmurs appreciated.  Pulmonary: Normal work of breathing.  Lungs CTAB.  Abdomen: soft, non-tender, non-distended.  Bowel sounds present.  No guarding.  Extremities: no deformities.  Warm, well perfused.  Right hip bandage is clean dry and intact without significant bruising or surrounding erythema/swelling  Skin: no rashes or lesions noted.  Warm and dry.  Neuro: No gross deficits noted.  Speech clear.    Psych: Appropriate affect.         Medications:      All current medications were reviewed with changes reflected in problem list.         Data:      All new " lab and imaging data was reviewed.   Labs:       Lab Results   Component Value Date     08/26/2022     08/25/2022     08/15/2022     07/07/2021     07/06/2021     07/05/2021    Lab Results   Component Value Date    CHLORIDE 93 08/26/2022    CHLORIDE 93 08/25/2022    CHLORIDE 96 08/15/2022    CHLORIDE 99 05/16/2022    CHLORIDE 99 03/16/2022    CHLORIDE 97 09/23/2021    CHLORIDE 102 07/07/2021    CHLORIDE 98 07/06/2021    CHLORIDE 95 07/05/2021    Lab Results   Component Value Date    BUN 12.3 08/26/2022    BUN 12.0 08/25/2022    BUN 17.3 08/15/2022    BUN 15 05/16/2022    BUN 13 03/16/2022    BUN 16 09/23/2021    BUN 22 07/07/2021    BUN 15 07/06/2021    BUN 18 07/05/2021      Lab Results   Component Value Date    POTASSIUM 5.0 08/26/2022    POTASSIUM 4.7 08/25/2022    POTASSIUM 4.1 08/15/2022    POTASSIUM 4.3 05/16/2022    POTASSIUM 4.6 03/16/2022    POTASSIUM 3.5 09/23/2021    POTASSIUM 3.9 07/07/2021    POTASSIUM 3.9 07/06/2021    POTASSIUM 3.0 07/06/2021    Lab Results   Component Value Date    CO2 33 08/26/2022    CO2 34 08/25/2022    CO2 38 08/15/2022    CO2 30 05/16/2022    CO2 31 03/16/2022    CO2 33 09/23/2021    CO2 35 07/07/2021    CO2 33 07/06/2021    CO2 34 07/05/2021    Lab Results   Component Value Date    CR 0.69 08/26/2022    CR 0.70 08/25/2022    CR 0.79 08/15/2022    CR 0.78 07/07/2021    CR 0.83 07/06/2021    CR 0.89 07/05/2021        Recent Labs   Lab 08/27/22  0600 08/26/22  1211   WBC  --  86.3*   HGB 7.6* 8.6*   HCT  --  30.1*   MCV  --  101*   PLT  --  71*      Imaging:   No results found for this or any previous visit (from the past 24 hour(s)).      David Ames, DO

## 2022-08-27 NOTE — PLAN OF CARE
A&Ox4. [place]. IV SL. Lungs clear. Congested productive cough. Bowl sounds active. Not passing gas. Drsg CDI.     Patient vital signs are at baseline: Yes, on 2L of O2 per her baseline.   Patient able to ambulate as they were prior to admission or with assist devices provided by therapies during their stay:  Yes, Ax1 using the walker and gait belt.   Patient MUST void prior to discharge:  Yes voiding, but retaining. Bladder scanned for   Patient able to tolerate oral intake:  Yes, tolerating a regular diet.   Pain has adequate pain control using Oral analgesics:  Yes, taking scheduled Tylenol.   Does patient have an identified :  Yes  Has goal D/C date and time been discussed with patient:  Yes      Goal Outcome Evaluation:    Plan of Care Reviewed With: patient     Overall Patient Progress: improving    Outcome Evaluation: Pt discharging [facility] with [facility] care RN/PT/OT.

## 2022-08-27 NOTE — PROGRESS NOTES
"   08/27/22 0958   Quick Adds   Type of Visit Initial PT Evaluation   Living Environment   People in Home alone   Current Living Arrangements apartment   Living Environment Comments Pt was living alone in [facility]. At baseline, mod I with all mobility with 4ww, IND with dressing, toileting, cooking, med mgmt and laundry. Req assist for bathing and housekeeping. Had/managed portable O2 at baseline.   Self-Care   Usual Activity Tolerance good   Current Activity Tolerance good   Equipment Currently Used at Home shower chair;walker, rolling   Fall history within last six months yes   Number of times patient has fallen within last six months 2   General Information   Onset of Illness/Injury or Date of Surgery 08/25/22   Referring Physician Adonis Ferguson MD   Patient/Family Therapy Goals Statement (PT) Return home   Pertinent History of Current Problem (include personal factors and/or comorbidities that impact the POC) From H&P: \"The patient is a 89-year-old female with chronic leukemia and cardiomyopathy, as well as coronary artery disease, who fell sustaining a right femoral neck fracture\" Pt is POD1 Cemented hemiarthroplasty, right hip.   Existing Precautions/Restrictions no hip IR;no hip ADD past midline;fall  (RLE WBAT)   Weight-Bearing Status - RLE weight-bearing as tolerated   Cognition   Affect/Mental Status (Cognition) WFL   Orientation Status (Cognition) oriented x 4   Safety Deficit (Cognition) minimal deficit  (Lets go of FWW occasionally to gesture during ambultaion)   Pain Assessment   Patient Currently in Pain   (Minimal pain in R hip with all mobility)   Posture    Posture Forward head position;Protracted shoulders   Range of Motion (ROM)   ROM Comment ROM in R hip limited by precautions and min pain, LLE and R knee and ankle ROM WFL   Strength (Manual Muscle Testing)   Strength Comments Global strength deficits noted with need for inc BUE support with transfers and gait d/t RLE pain and weakness   Bed " Mobility   Comment, (Bed Mobility) Sup <> sit with SBA   Transfers   Comment, (Transfers) STS/SPT with FWW and SBA/CGA   Gait/Stairs (Locomotion)   Distance in Feet (Required for LE Total Joints) 5' eval; 200' total during session   Comment, (Gait/Stairs) Pt amb 200' with fww and SBA with Th managing portable O2 and providing w/c follow. Initial antalgic step to gait that progressed to step through and even gait pattern.   Balance   Balance Comments Fair + standing balance, with need for BUE support for all dynamic balance   Clinical Impression   Criteria for Skilled Therapeutic Intervention Yes, treatment indicated   PT Diagnosis (PT) Impaired functional mobility   Influenced by the following impairments Reduced BLE (RLE strength deficits > LLE) strength deficits, impaired standing balance, reduced activity tolerance 2/2 fatigue and O2 needs   Functional limitations due to impairments impaired bed mob, transfers, and gait   Clinical Presentation (PT Evaluation Complexity) Stable/Uncomplicated   Clinical Presentation Rationale PMHx, accecssible PLE, stable medical presentation, motivated to return home   Clinical Decision Making (Complexity) low complexity   Planned Therapy Interventions (PT) balance training;bed mobility training;gait training;neuromuscular re-education;patient/family education;strengthening;transfer training;progressive activity/exercise;risk factor education;home program guidelines   Anticipated Equipment Needs at Discharge (PT) walker, rolling;shower chair   Risk & Benefits of therapy have been explained evaluation/treatment results reviewed;care plan/treatment goals reviewed;risks/benefits reviewed;current/potential barriers reviewed;participants voiced agreement with care plan;participants included;patient   PT Discharge Planning   PT Discharge Recommendation (DC Rec) home with assist;home with home care physical therapy;Transitional Care Facility   PT Rationale for DC Rec Recommend home with  assist and HH PT to progress strength, balance, activity tolerance and reduce falls risk and facilitate return to PLOF. Anticipate with IP PT, pt will reach mod I during stay. If not, recommend TCU to address the above impairements.   PT Brief overview of current status SBA/CGA with bed mob, transfers, gait with FWW up to 200 feet   Total Evaluation Time   Total Evaluation Time (Minutes) 10   Physical Therapy Goals   PT Frequency Daily   PT Predicted Duration/Target Date for Goal Attainment 08/29/22   PT Goals Bed Mobility;Transfers;Gait   PT: Bed Mobility Modified independent;Supine to/from sit;Within precautions   PT: Transfers Modified independent;Sit to/from stand;Assistive device;Within precautions   PT: Gait Modified independent;150 feet;Within precautions;Rolling walker  (with 4ww and mod I mgmt of portable O2 tubing)

## 2022-08-27 NOTE — PLAN OF CARE
Day RN ([id number])    Patient vital signs are at baseline: Yes  Patient able to ambulate as they were prior to admission or with assist devices provided by therapies during their stay:  No,  Reason:  Up A2 for a few minutes for first time since surgery this shift.  Tolerated well though a bit shaky, denying any pain.  Patient MUST void prior to discharge:  No,  Reason:  DTV - Bladder scanned at around 1715 for 216.  Patient able to tolerate oral intake:  Yes  Pain has adequate pain control using Oral analgesics:  Yes  Does patient have an identified :  Yes  Has goal D/C date and time been discussed with patient:  Yes    Pt A/O x4 -  a bit forgetful.  [place]  VSS and afebrile - 2l oxygen as per baseline.  Pt denied pain - scheduled PO Tylenol and ice in use.  CMS intact - moderate dorsi/plantar.  Dressing CDI.  Up A2 with walker and gait belt.  Due to void as noted above.  Tolerating regular diet well.  Plan is TCU on discharge.  Will continue to monitor.

## 2022-08-28 NOTE — PLAN OF CARE
Goal Outcome Evaluation:  .Patient vital signs are at baseline: Yes  Patient able to ambulate as they were prior to admission or with assist devices provided by therapies during their stay:  Yes  Patient MUST void prior to discharge:  Yes  Patient able to tolerate oral intake:  Yes  Pain has adequate pain control using Oral analgesics:  Yes  Does patient have an identified :  Yes  Has goal D/C date and time been discussed with patient:  Yes     Patient continues on 2 litres of oxygen, Alert and oriented  with some forgetfulness. Plan to return to [facility] facility upon discharge from TCU

## 2022-08-28 NOTE — PROVIDER NOTIFICATION
Paged Dr. Ames at [phone]:  Just FYI:  Pt's hmg was 7.0 this morning, down from 7.6 yesterday.  Asymptomatic.  Thank you    Received order for 1300 hmg recheck, thank you

## 2022-08-28 NOTE — PLAN OF CARE
Day RN ([id number])     Patient vital signs are at baseline: Yes  Patient able to ambulate as they were prior to admission or with assist devices provided by therapies during their stay:  Yes  Patient MUST void prior to discharge:  Yes  Patient able to tolerate oral intake:  Yes  Pain has adequate pain control using Oral analgesics:  Yes  Does patient have an identified :  Yes  Has goal D/C date and time been discussed with patient:  Yes     Pt A/O x4 -  a bit forgetful.  [place].  VSS and afebrile - 2l oxygen as per baseline.  Pt denied pain - scheduled PO Tylenol and ice in use.  CMS intact.  Dressing CDI.  Up A1 with walker and gait belt.  Voiding adequately.  Tolerating regular diet well.  Hmg was low for AM check at 7.0 - MD ordered a 1300 recheck and it resulted 8.0.  Plan is TCU on discharge - SW involved and pending placement.  Will continue to monitor.

## 2022-08-28 NOTE — PROGRESS NOTES
"CLINICAL NUTRITION SERVICES - ASSESSMENT NOTE     Nutrition Prescription    RECOMMENDATIONS FOR MDs/PROVIDERS TO ORDER:  None    Malnutrition Status:    Unable to assess, needs NFPE    Recommendations already ordered by Registered Dietitian (RD):  Adjusted diet to Easy to Chew per pt request dt her not having teeth    Future/Additional Recommendations:  Consider offering nutrition supplements if loses any more weight       REASON FOR ASSESSMENT  Marie Kline is a/an 89 year old female assessed by the dietitian for Admission Nutrition Risk Screen for positive    Patient presents  S/p repair of rt femoral fx, COPD.    NUTRITION HISTORY  Pt stated she has a very good appetite that is just as good as it has always been. Pt doesn't have teeth so would prefer an easy to chew diet. Pt stated she grew up during the Great Depression so always ate whatever was in front of her.     UBW 85-89lbs    CURRENT NUTRITION ORDERS  Diet: Regular  Intake/Tolerance: Pt eating 100% of meals per nursing records and ordered 2510kcals and 81g protein yesterday.     LABS  Labs reviewed: Na 128, anion gap 2, alb 3.3, tot pro 5.0    MEDICATIONS  Medications reviewed: lasix, synthroid/levothroid, miralax, deltasone, senokot-s/pericolace    ANTHROPOMETRICS  Height: 149.9 cm (4' 11\")  Most Recent Weight: 38.1 kg (84 lb)    IBW: 44.3 kg  BMI: Underweight BMI <18.5  Weight History:   Wt Readings from Last 30 Encounters:   08/26/22 38.1 kg (84 lb)   08/18/22 43.4 kg (95 lb 9.6 oz)  Question accuracy   08/16/22 41.7 kg (92 lb)   08/12/22 40 kg (88 lb 1.6 oz)   08/10/22 40.3 kg (88 lb 12.8 oz)   05/16/22 39 kg (86 lb)                  2.3% wt loss in 3 months   04/04/22 40.9 kg (90 lb 1.6 oz)   03/22/22 39.9 kg (88 lb)   03/16/22 40.4 kg (89 lb)   02/17/22 40.4 kg (89 lb)   10/25/21 39.9 kg (88 lb)   10/06/21 40.1 kg (88 lb 6.4 oz)   09/20/21 38.5 kg (84 lb 14.4 oz)   07/21/21 41.4 kg (91 lb 3.2 oz)   07/07/21 41.2 kg (90 lb 14.4 oz)   05/19/21 40.8 kg " (90 lb)   05/12/21 40.8 kg (90 lb)   04/15/21 38.3 kg (84 lb 8 oz)   04/07/21 38.9 kg (85 lb 12.8 oz)   03/30/21 41.9 kg (92 lb 4.8 oz)   03/24/21 40.4 kg (89 lb 1.6 oz)   01/06/21 40.2 kg (88 lb 11 oz)   09/10/20 43.1 kg (95 lb 1.6 oz)   09/03/20 42.6 kg (94 lb)   07/09/20 42.6 kg (94 lb)   05/21/20 46.3 kg (102 lb)   03/05/20 44.5 kg (98 lb 1.6 oz)   01/22/20 43.7 kg (96 lb 6.4 oz)   11/13/19 45.6 kg (100 lb 8 oz)   11/12/19 44.9 kg (99 lb)       Dosing Weight: 38.1 kg    ASSESSED NUTRITION NEEDS  Estimated Energy Needs: 857 kcals/day ([place] St Jeor)  Justification: Underweight  Estimated Protein Needs: 46-57 grams protein/day (1.2 - 1.5 grams of pro/kg)  Justification: Repletion  Estimated Fluid Needs: 955-1144 mL/day (25 - 30 mL/kg)   Justification: Maintenance    PHYSICAL FINDINGS  See malnutrition section below.  Dry skin  Poor skin turgor       MALNUTRITION:  % Weight Loss:  Weight loss does not meet criteria for malnutrition as it is not significant  % Intake:  No decreased intake noted  Subcutaneous Fat Loss:  Unable to assess  Muscle Loss:  Unable to assess  Fluid Retention:  Unable to assess    Malnutrition Diagnosis: Unable to determine due to need NFPE    NUTRITION DIAGNOSIS  Increased nutrient needs related to repair of rt femoral neck fx and COPD as evidenced by BMI 16.97      INTERVENTIONS  Implementation  Encourage PO   Modify composition of meals/snacks     Goals  Patient to consume % of nutritionally adequate meals three times per day, or the equivalent with supplements/snacks.  Total avg nutritional intake to meet a minimum of 857 kcal/kg and 46 g PRO/kg daily (per dosing wt 38.1 kg).     Monitoring/Evaluation  Progress toward goals will be monitored and evaluated per protocol. PO intake, wt, labs.

## 2022-08-28 NOTE — PLAN OF CARE
Problem: Oral Intake Inadequate  Goal: Improved Oral Intake  Outcome: Met     Goal Outcome Evaluation:    Plan of Care Reviewed With: patient     Overall Patient Progress: improving    Outcome Evaluation: Pt eating 100% of meals per nursing records and exeeding estimated needs. Adjust diet order to Easy to Chew per pt request dt missing teeth.

## 2022-08-28 NOTE — PROGRESS NOTES
Orthopedic Surgery  Marie Kline  DATE: 08/28/2022    Admit Date:  8/25/2022    A/P:  Marie Kline is a 89 year old with CLL, mild chronic dysphagia on modified diet, chronic hyponatremia, cog impairment, HTN, and oxygen-dependent COPD admitted on 8/25/2022 after fall with resulting right FNFx.    #Right femoral neck fracture s/p right hip hemiarthroplasty, #2 Days Post-Op  No periop complications noted.  Progressing nicely.  On baseline 2L NC.  Pain well controlled with current pain regimen.  -  WBAT RLE with gait aid  -  Aquacel c/d/i.  Change if >70% saturation  -  ASA 81 daily for DVT PPx    #Acute on chronic anemia  Baseline Hgb ~8.5-9.5.    Hgb 7.6 >> 7 overnight but back to 8.0 on recheck.  No s/sx ongoing bleeding.  Dressing without significant saturation.  O2 needs at baseline.   -  Repeat Hgb in AM    #Disposition  -  Anticipate DC to TCU  -  Follow-up with Ortho in 2 weeks.      Sussy Richards [facility]  [phone]  Text Page (7AM - 5PM)    _________________________________________________________    INTERVAL HISTORY:  No overnight events.  Resting comfortably in bed.         PHYSICAL EXAM:  Vital Signs: Temp: 98.3  F (36.8  C) Temp src: Temporal BP: (!) 163/69 Pulse: 81   Resp: 18 SpO2: 93 % O2 Device: Nasal cannula Oxygen Delivery: 2 LPM  I/O last 3 completed shifts:  In: 390 [P.O.:390]  Out: -   Vitals:    08/26/22 0928   Weight: 38.1 kg (84 lb)       Pleasant, cooperative.  Nontoxic.  NAD   NCAT.  EOM grossly intact bilaterally. MMM  Respirations unlabored, at baseline O2 needs  Aquacel c/d/i  No significant erythema or induration surrounding incision  Strength intact, symmetric in distal BLE flexors and extensors  NVI  Digits WWP      LABORATORY DATA:  I reviewed all medications, new labs and imaging results over the last 24 hours.  Recent Labs   Lab Test 08/26/22  1226 08/25/22  1223 08/15/22  0508   * 131* 137   POTASSIUM 5.0 4.7 4.1   BUN 12.3 12.0 17.3   CR 0.69 0.70  0.79     Recent Labs   Lab Test 08/28/22  1250 08/28/22  0656 08/27/22  0600 08/26/22  1211 08/25/22  1223 08/22/22  0934 08/15/22  0508 08/09/22  0623   WBC  --   --   --  86.3* 92.6* 201.8* 154.2* 88.3*   HGB 8.0* 7.0* 7.6* 8.6* 8.8* 10.1* 8.4* 8.6*   PLT  --   --   --  71* 74* 142* 95* 55*     Recent Labs   Lab Test 08/25/22  1615 08/24/18  1306 10/09/14  0350   INR 1.07 1.00 1.21*       Results for orders placed or performed during the hospital encounter of 08/25/22   XR Pelvis w Hip Right 1 View    Narrative    PELVIS AND RIGHT HIP, ONE VIEW   8/25/2022 1:20 PM     HISTORY:  Pain, fall.    COMPARISON: None.      Impression    IMPRESSION: There is a right femoral neck fracture. There is 2 to 3 cm  of displacement and impaction. Moderate apex anterior and  superolateral angulation. Bipolar left hip hemiarthroplasty  incidentally noted. Arterial calcifications and degenerative changes  in the lumbar spine incidentally noted.    ELAINA BERUMEN MD         SYSTEM ID:  [id number]   XR Chest 1 View    Narrative    XR CHEST 1 VIEW   8/25/2022 1:22 PM     HISTORY: fall, suspected hip fx, preop planning    COMPARISON: 8/6/2022.      Impression    IMPRESSION: Hyperinflation consistent with COPD. Linear mild scarring  in the left lower lung. No acute airspace opacity, pleural effusion or  pneumothorax. Normal heart size. Aortic calcifications. Old right  posterior rib fracture. S-shaped thoracolumbar curve with degenerative  changes.    GEORGIA PAIGE MD         SYSTEM ID:  [id number]   XR Pelvis w Hip Port Right 1 View    Narrative    XR PELVIS AND HIP PORTABLE RIGHT 1 VIEW 8/26/2022 11:52 AM     HISTORY: Status post Hip surgery    COMPARISON: 8/25/2022      Impression    IMPRESSION: Bipolar right hip arthroplasty. Postoperative air is  present. Skin staples are in place. Findings are new. No fractures are  evident. Bipolar left hip arthroplasty. Osteopenia.    BLANIE BURCIAGA MD         SYSTEM ID:  [id number]      *Note: Due to a large number of results and/or encounters for the requested time period, some results have not been displayed. A complete set of results can be found in Results Review.

## 2022-08-28 NOTE — PROGRESS NOTES
Care Management Follow Up    Length of Stay (days): 3    Expected Discharge Date: 08/28/2022     Concerns to be Addressed: care coordination/care conferences, discharge planning     Patient plan of care discussed at interdisciplinary rounds: Yes    Anticipated Discharge Disposition: Transitional Care     Anticipated Discharge Services: None  Anticipated Discharge DME: None    Patient/family educated on Medicare website which has current facility and service quality ratings: no  Education Provided on the Discharge Plan:  Yes  Patient/Family in Agreement with the Plan: yes    Referrals Placed by CM/SW: Post Acute Facilities  Private pay costs discussed: Not applicable    Additional Information:  Care management following for discharge planning and recommendations.  TCU recommended as patient unable to receive 24/7 assistance at [facility].  Spoke with patient at bedside and daughter Margaret over the phone. Discussed recommendation for TCU and requesting choice for facility.  [facility] is first choice in facility. Agreeable to additional referrals sent, with second choice [facility]. Prefers referral not sent to [facility] in [place].     Admissions is closed at [facility] this weekend.  Care management will follow up with admissions at TCU on Monday.       Sussy Samuel, RN      Sussy Samuel RN Case Manager  Inpatient Care Coordination  [facility]   [phone]

## 2022-08-28 NOTE — PROGRESS NOTES
[facility]  Hospitalist Progress Note  David Ames,  08/28/22       Reason for Stay (Diagnosis): Mechanical fall cb hip fracture         Assessment and Plan:      Summary of Stay: Marie Kline is a 89 year old female PMH notable for CLL with WBC ranges from 50,000 150,000, COPD with chronic hypoxic respiratory failure on 2 L nasal cannula, essential hypertension, dysphagia, cognitive impairment, remote history of Takotsubo cardiomyopathy which is resolved who is admitted on 8/25/2022 with mechanical fall complicated by right femoral neck fracture.    Orthopedic surgery was consulted and the patient is now status post surgical fix.  She is working with physical therapy who have cleared her for discharge home with assist, home with physical therapy.  However, it is unclear if patient has 24-hour assist as she comes from an independent [facility].  Social work consulted.      Problem List/Assessment and Plan:   Mechanical fall complicated by right femoral neck fracture s/p surgical fix:  Presents with right leg pain.  Orthopedic surgery was consulted and the patient is now status post surgical fix.  Operative course was uncomplicated.  Patient has been evaluated physical therapy who are recommending home with assist, home with physical therapy.  Patient historically resides at an assisted living facility but is independent.  She does not have 24 hour assist.  Safest dispo plan would be TCU.  SW following.     COPD with chronic hypoxemic respiratory failure:  Patient is chronically on 2 L nasal cannula.  She is currently on a prednisone taper that was started during her recent hospitalization.  This is being continued while here.    CLL with chronic leukocytosis, anemia, thrombocytopenia:  WBC typically ranges from 50,000 150,000.  She is within that range here.   Hgb a bit lower than baseline, but no evidence for active bleeding.     Mild dysphagia:  Historically on DD3 diet at [facility].  Is also report that she  "uses regular diet at home.  It appears that she is tolerating regular diet here.    Chronic mild hyponatremia:  Sodium level 128 on 8/26.  This is lower than her historic baseline.  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 intake.      DVT Prophylaxis: Aspirin 81 mg daily after discussion with orthopedic surgery  Code Status: Full Code - will clarify, appears to be DNR/DNI on admission  Discharge Dispo/Date: Anticipate discharge in 1 to 2 days pending ability to find safe dispo.        Interval History (Subjective):      Feeling fine. Is hoping to discharge. We talked about need for TCU. She seemed bummed about this but ultimately seems agreeable. NAEO.                   Physical Exam:      Last Vital Signs:  BP (!) 141/53 (BP Location: Right arm)   Pulse 72   Temp 98  F (36.7  C) (Temporal)   Resp 18   Ht 1.499 m (4' 11\")   Wt 38.1 kg (84 lb)   LMP  (LMP Unknown)   SpO2 96%   BMI 16.97 kg/m        Intake/Output Summary (Last 24 hours) at 8/27/2022 1437  Last data filed at 8/27/2022 1330  Gross per 24 hour   Intake 1685 ml   Output 650 ml   Net 1035 ml       General: Alert, awake, no acute distress.  HEENT: NC/AT, eyes anicteric, external occular movements intact, face symmetric.    Cardiac: RRR, S1, S2.  No murmurs appreciated.  Pulmonary: Normal work of breathing.  Lungs CTAB.  Abdomen: soft, non-tender, non-distended.  Bowel sounds present.  No guarding.  Extremities: no deformities.  Warm, well perfused.  Right hip bandage is clean dry and intact without significant bruising or surrounding erythema/swelling  Skin: no rashes or lesions noted.  Warm and dry.  Neuro: No gross deficits noted.  Speech clear.    Psych: Appropriate affect.         Medications:      All current medications were reviewed with changes reflected in problem list.         " Data:      All new lab and imaging data was reviewed.   Labs:       Lab Results   Component Value Date     08/26/2022     08/25/2022     08/15/2022     07/07/2021     07/06/2021     07/05/2021    Lab Results   Component Value Date    CHLORIDE 93 08/26/2022    CHLORIDE 93 08/25/2022    CHLORIDE 96 08/15/2022    CHLORIDE 99 05/16/2022    CHLORIDE 99 03/16/2022    CHLORIDE 97 09/23/2021    CHLORIDE 102 07/07/2021    CHLORIDE 98 07/06/2021    CHLORIDE 95 07/05/2021    Lab Results   Component Value Date    BUN 12.3 08/26/2022    BUN 12.0 08/25/2022    BUN 17.3 08/15/2022    BUN 15 05/16/2022    BUN 13 03/16/2022    BUN 16 09/23/2021    BUN 22 07/07/2021    BUN 15 07/06/2021    BUN 18 07/05/2021      Lab Results   Component Value Date    POTASSIUM 5.0 08/26/2022    POTASSIUM 4.7 08/25/2022    POTASSIUM 4.1 08/15/2022    POTASSIUM 4.3 05/16/2022    POTASSIUM 4.6 03/16/2022    POTASSIUM 3.5 09/23/2021    POTASSIUM 3.9 07/07/2021    POTASSIUM 3.9 07/06/2021    POTASSIUM 3.0 07/06/2021    Lab Results   Component Value Date    CO2 33 08/26/2022    CO2 34 08/25/2022    CO2 38 08/15/2022    CO2 30 05/16/2022    CO2 31 03/16/2022    CO2 33 09/23/2021    CO2 35 07/07/2021    CO2 33 07/06/2021    CO2 34 07/05/2021    Lab Results   Component Value Date    CR 0.69 08/26/2022    CR 0.70 08/25/2022    CR 0.79 08/15/2022    CR 0.78 07/07/2021    CR 0.83 07/06/2021    CR 0.89 07/05/2021        Recent Labs   Lab 08/28/22  1250 08/27/22  0600 08/26/22  1211   WBC  --   --  86.3*   HGB 8.0*   < > 8.6*   HCT  --   --  30.1*   MCV  --   --  101*   PLT  --   --  71*    < > = values in this interval not displayed.      Imaging:   No results found for this or any previous visit (from the past 24 hour(s)).      David Ames,

## 2022-08-28 NOTE — PROGRESS NOTES
08/28/22 0800   Quick Adds   Type of Visit Initial Occupational Therapy Evaluation   Living Environment   People in Home alone   Current Living Arrangements apartment;independent living facility   Living Environment Comments lives alone in ILF. no stairs. walk in shower with seat   Self-Care   Usual Activity Tolerance good   Current Activity Tolerance moderate   Equipment Currently Used at Home shower chair;walker, rolling   Fall history within last six months yes   Number of times patient has fallen within last six months 2   Activity/Exercise/Self-Care Comment indp using 4WW rolling walker, shower chair for bathing. does not have assist for ADL   Instrumental Activities of Daily Living (IADL)   IADL Comments reports indp with all IADL except house cleaning/mgmt.   General Information   Onset of Illness/Injury or Date of Surgery 08/25/22   Referring Physician Adonis Ferguson MD   Patient/Family Therapy Goal Statement (OT) pt wants to return to home apartment   Additional Occupational Profile Info/Pertinent History of Current Problem Marie Kline is a 89 year old female PMH notable for CLL with WBC ranges from 50,000 150,000, COPD with chronic hypoxic respiratory failure on 2 L nasal cannula, essential hypertension, dysphagia, cognitive impairment, remote history of Takotsubo cardiomyopathy which is resolved who is admitted on 8/25/2022 with mechanical fall complicated by right femoral neck fracture. now post op  hemiartho hip   Performance Patterns (Routines, Roles, Habits) knitting, painting   Existing Precautions/Restrictions fall;no hip IR;no hip ADD past midline   General Observations and Info WBAT hip. agreeable to OT. reports has had OT before. on 2 LNC. wears at baseline   Cognitive Status Examination   Orientation Status orientation to person, place and time   Cognitive Status Comments eccentric   Visual Perception   Visual Impairment/Limitations corrective lenses full-time   Sensory   Sensory  Quick Adds No deficits were identified   Pain Assessment   Patient Currently in Pain Yes, see Vital Sign flowsheet   Posture   Posture forward head position;protracted shoulders   Range of Motion Comprehensive   General Range of Motion no range of motion deficits identified   Strength Comprehensive (MMT)   General Manual Muscle Testing (MMT) Assessment no strength deficits identified   Coordination   Upper Extremity Coordination No deficits were identified   Bed Mobility   Bed Mobility supine-sit   Supine-Sit [place] (Bed Mobility) minimum assist (75% patient effort)   Transfers   Transfers sit-stand transfer   Sit-Stand Transfer   Sit-Stand [place] (Transfers) contact guard   Assistive Device (Sit-Stand Transfers) walker, 4-wheeled   Activities of Daily Living   BADL Assessment/Intervention lower body dressing;upper body dressing;toileting;bathing   Bathing Assessment/Intervention   [place] Level (Bathing) moderate assist (50% patient effort)   Comment, (Bathing) seated   Upper Body Dressing Assessment/Training   [place] Level (Upper Body Dressing) set up   Lower Body Dressing Assessment/Training   [place] Level (Lower Body Dressing) minimum assist (75% patient effort)   Toileting   [place] Level (Toileting) contact guard assist   Clinical Impression   Criteria for Skilled Therapeutic Interventions Met (OT) Yes, treatment indicated   OT Diagnosis decreased function in ADL   OT Problem List-Impairments impacting ADL problems related to;activity tolerance impaired;flexibility;mobility;pain;post-surgical precautions   Assessment of Occupational Performance 5 or more Performance Deficits   Identified Performance Deficits bathing, dressing, toileting, home mgmt, mobility   Planned Therapy Interventions (OT) ADL retraining;IADL retraining;progressive activity/exercise   Clinical Decision Making Complexity (OT) low complexity   Anticipated Equipment Needs Upon Discharge (OT)   (defer to next  level of care)   Risk & Benefits of therapy have been explained evaluation/treatment results reviewed;risks/benefits reviewed;care plan/treatment goals reviewed;current/potential barriers reviewed;participants voiced agreement with care plan;participants included;patient   Clinical Impression Comments below baseline function in self care and ADL warrants skilled IP OT tx   OT Discharge Planning   OT Discharge Recommendation (DC Rec) Transitional Care Facility;home with home care occupational therapy;home with assist   OT Rationale for DC Rec pt below baseline function in self care and ADL. lives alone, cares for all ADL and most IADL indp in ILF. currently not able to care for self as pLOF. will need TCU at discharge. if home then would need Ax1 for all transfer, mobility, self care and IADL. appears pt has all equipt at home at this time   Total Evaluation Time (Minutes)   Total Evaluation Time (Minutes) 8   OT Goals   Therapy Frequency (OT) 5 times/wk   OT Predicted Duration/Target Date for Goal Attainment 09/03/22   OT Goals OT Goal 1;Toilet Transfer/Toileting;Lower Body Dressing;Transfers   OT: Lower Body Dressing using adaptive equipment;Supervision/stand-by assist   OT: Transfer Minimal assist  (walk in shower with chiar)   OT: Toilet Transfer/Toileting Supervision/stand-by assist;toilet transfer;cleaning and garment management;using adaptive equipment;within precautions   OT: Goal 1 Pt will verbalize 2/2 precautions

## 2022-08-29 NOTE — PLAN OF CARE
Goal Outcome Evaluation: Ongoing, progressing.    Plan of care reviewed with: Patient    Pertinent Assessments: A/Ox4, but forgetful. VSS on baseline 2L via NC. CMS intact, except +1 pulses bilaterally. Aquacel CDI. Denies pain. Voids via BR.  Major Shift Events: None  Treatment Plan: Mobilize with PT/OT. Pain control. Discharge to TCU when bed available, as early as tomorrow?.

## 2022-08-29 NOTE — PROGRESS NOTES
[facility]  Hospitalist Progress Note  David Ames,  08/29/22       Reason for Stay (Diagnosis): Mechanical fall cb hip fracture         Assessment and Plan:      Summary of Stay: Marie Kline is a 89 year old female PMH notable for CLL with WBC ranges from 50,000 150,000, COPD with chronic hypoxic respiratory failure on 2 L nasal cannula, essential hypertension, dysphagia, cognitive impairment, remote history of Takotsubo cardiomyopathy which is resolved who is admitted on 8/25/2022 with mechanical fall complicated by right femoral neck fracture.    Orthopedic surgery was consulted and the patient is now status post surgical fix.  She is working with physical therapy who have cleared her for discharge home with assist, home with physical therapy.  However, it is unclear if patient has 24-hour assist as she comes from an independent [facility].  Social work consulted.      Problem List/Assessment and Plan:   Mechanical fall complicated by right femoral neck fracture s/p surgical fix:  Presents with right leg pain.  Orthopedic surgery was consulted and the patient is now status post surgical fix.  Operative course was uncomplicated.  Patient has been evaluated physical therapy who are recommending home with assist, home with physical therapy.  Patient historically resides at an assisted living facility but is independent.  She does not have 24 hour assist.  Safest dispo plan would be TCU.  SW following.     COPD with chronic hypoxemic respiratory failure:  Patient is chronically on 2 L nasal cannula.  She is currently on a prednisone taper that was started during her recent hospitalization.  This is being continued while here (to end 9/1).    CLL with chronic leukocytosis, anemia, thrombocytopenia:  WBC typically ranges from 50,000 150,000.  She is within that range here.   Hgb a bit lower than baseline, but no evidence for active bleeding.     Mild dysphagia:  Historically on DD3 diet at [facility].  Is also  "report that she uses regular diet at home.  It appears that she is tolerating regular diet here.    Chronic mild hyponatremia:  Sodium level 128 on 8/26.  This is lower than her historic baseline.  Suspect that this is largely insignificant.  Now near normal.     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 intake.      DVT Prophylaxis: Aspirin 81 mg daily after discussion with orthopedic surgery  Code Status: Full Code - will clarify, appears to be DNR/DNI on admission  Discharge Dispo/Date: Anticipate discharge in 1 to 2 days pending ability to find safe dispo.        Interval History (Subjective):      NAEO. Feels ok. No big complaints. Awaiting placement.                   Physical Exam:      Last Vital Signs:  BP (!) 150/70 (BP Location: Right arm)   Pulse 83   Temp 98.4  F (36.9  C) (Temporal)   Resp 20   Ht 1.499 m (4' 11\")   Wt 38.1 kg (84 lb)   LMP  (LMP Unknown)   SpO2 95%   BMI 16.97 kg/m        Intake/Output Summary (Last 24 hours) at 8/27/2022 1437  Last data filed at 8/27/2022 1330  Gross per 24 hour   Intake 1685 ml   Output 650 ml   Net 1035 ml       General: Alert, awake, no acute distress.  HEENT: NC/AT, eyes anicteric, external occular movements intact, face symmetric.    Cardiac: RRR, S1, S2.  No murmurs appreciated.  Pulmonary: Normal work of breathing.  Lungs CTAB.  Abdomen: soft, non-tender, non-distended.  Bowel sounds present.  No guarding.  Extremities: no deformities.  Warm, well perfused.  Right hip bandage is clean dry and intact without significant bruising or surrounding erythema/swelling  Skin: no rashes or lesions noted.  Warm and dry.  Neuro: No gross deficits noted.  Speech clear.    Psych: Appropriate affect.         Medications:      All current medications were reviewed with changes reflected in problem list.         Data:      All new lab and imaging data was reviewed.   Labs:       Lab Results "   Component Value Date     08/26/2022     08/25/2022     08/15/2022     07/07/2021     07/06/2021     07/05/2021    Lab Results   Component Value Date    CHLORIDE 93 08/26/2022    CHLORIDE 93 08/25/2022    CHLORIDE 96 08/15/2022    CHLORIDE 99 05/16/2022    CHLORIDE 99 03/16/2022    CHLORIDE 97 09/23/2021    CHLORIDE 102 07/07/2021    CHLORIDE 98 07/06/2021    CHLORIDE 95 07/05/2021    Lab Results   Component Value Date    BUN 12.3 08/26/2022    BUN 12.0 08/25/2022    BUN 17.3 08/15/2022    BUN 15 05/16/2022    BUN 13 03/16/2022    BUN 16 09/23/2021    BUN 22 07/07/2021    BUN 15 07/06/2021    BUN 18 07/05/2021      Lab Results   Component Value Date    POTASSIUM 5.0 08/26/2022    POTASSIUM 4.7 08/25/2022    POTASSIUM 4.1 08/15/2022    POTASSIUM 4.3 05/16/2022    POTASSIUM 4.6 03/16/2022    POTASSIUM 3.5 09/23/2021    POTASSIUM 3.9 07/07/2021    POTASSIUM 3.9 07/06/2021    POTASSIUM 3.0 07/06/2021    Lab Results   Component Value Date    CO2 33 08/26/2022    CO2 34 08/25/2022    CO2 38 08/15/2022    CO2 30 05/16/2022    CO2 31 03/16/2022    CO2 33 09/23/2021    CO2 35 07/07/2021    CO2 33 07/06/2021    CO2 34 07/05/2021    Lab Results   Component Value Date    CR 0.69 08/26/2022    CR 0.70 08/25/2022    CR 0.79 08/15/2022    CR 0.78 07/07/2021    CR 0.83 07/06/2021    CR 0.89 07/05/2021        Recent Labs   Lab 08/29/22  0617   WBC 77.9*   HGB 7.7*   HCT 26.4*      PLT 58*      Imaging:   No results found for this or any previous visit (from the past 24 hour(s)).      David Ames DO

## 2022-08-29 NOTE — PROGRESS NOTES
"Orthopedic Surgery  8/29/2022  POD 3    S: Patient voices no unexpected ortho complaints today. Denies chest pain or shortness of breath. Resting comfortably in bed.    O: Blood pressure (!) 150/70, pulse 86, temperature 98.4  F (36.9  C), temperature source Temporal, resp. rate 16, height 1.499 m (4' 11\"), weight 38.1 kg (84 lb), SpO2 94 %, not currently breastfeeding.  Lab Results   Component Value Date    HGB 7.7 08/29/2022    HGB 8.8 07/07/2021     Lab Results   Component Value Date    INR 1.07 08/25/2022    INR 1.00 08/24/2018     I/O last 3 completed shifts:  In: 480 [P.O.:480]  Out: -   Distal extremity CMSI bilaterally.  Calves are negative bilaterally, both soft and nontender.  The dressing is C/D/I.      A: Ms. Kline is doing well status post Procedure(s):  Cemented hemiarthroplasty, right hip..    P: Continue physical therapy. Continue DVT pphx with ASA due to high mobility and low risk factors for DVT. Anticipate discharge to TCU when bed available.    Holly Ge PA-C  [phone]  "

## 2022-08-29 NOTE — PLAN OF CARE
Goal Outcome Evaluation:  .Patient vital signs are at baseline: Yes  Patient able to ambulate as they were prior to admission or with assist devices provided by therapies during their stay:  Yes  Patient MUST void prior to discharge:  Yes  Patient able to tolerate oral intake:  Yes  Pain has adequate pain control using Oral analgesics:  Yes  Does patient have an identified :  Yes  Has goal D/C date and time been discussed with patient:  Yes       Pt is alert and oriented x4 with some forgetful, [place]. 2 l of oxygen. Pt uses tylenol and ice to the right hip . CMS  intact. Regular diet with soft bits. Plan of discharge to TCU.

## 2022-08-29 NOTE — PROGRESS NOTES
"Care Management Follow Up    Length of Stay (days): 4    Expected Discharge Date: 08/29/2022     Concerns to be Addressed: care coordination/care conferences, discharge planning     Patient plan of care discussed at interdisciplinary rounds: Yes    Anticipated Discharge Disposition: Transitional Care     Anticipated Discharge Services: None  Anticipated Discharge DME: None    Patient/family educated on Medicare website which has current facility and service quality ratings: no  Education Provided on the Discharge Plan:  Yes  Patient/Family in Agreement with the Plan: yes    Referrals Placed by CM/SW: Post Acute Facilities  Private pay costs discussed: Not applicable    Additional Information:  Care management following for discharge planning to recommended TCU. TCU referrals have been sent.  [facility] patient's first choice, is reviewing but no bed available today.  [facility] is reviewing with potential bed availability today.    TCU requesting clarification that outpatient IVIG infusions would be held until patient discharges from TCU.     Update 1050: Received handoff communication:  \"Date:  August 26, 2022  Name: Marie Kline is enrolled in Ambulatory Care Coordination program and I am the Lead Care Coordinator.  CC Contact Information: Aneta Thomas, Phone: [phone]   Additional details/specific concerns r/t this admission:               No additional concerns at this time.\"     Inpatient care management will give handoff upon discharge for further follow up.      Update 1535: TCU [facility] and [facility] has accepted for TCU. [facility] is willing to accept. [facility] has declined d/t no bed availability.  Left VM for daughter Margaret updating on acceptance to TCU. Patient requested that care management return at a later time as was resting in bed after therapy session.     Care management will follow up with patient and family for choice of TCU and to arrange for discharge to " TCU.         Sussy Samuel, RN      Sussy Samuel RN Case Manager  Inpatient Care Coordination  [facility]   [phone]

## 2022-08-30 NOTE — PROGRESS NOTES
"Orthopedic Surgery  8/30/2022  POD 4    S: Patient voices no unexpected ortho complaints today. Denies chest pain or shortness of breath. Only question she had for me is where she will be going to TCU.    O: Blood pressure (!) 165/68, pulse 82, temperature 98.4  F (36.9  C), temperature source Temporal, resp. rate 18, height 1.499 m (4' 11\"), weight 38.1 kg (84 lb), SpO2 98 %, not currently breastfeeding.  Lab Results   Component Value Date    HGB 7.7 08/29/2022    HGB 8.8 07/07/2021     Lab Results   Component Value Date    INR 1.07 08/25/2022    INR 1.00 08/24/2018     I/O last 3 completed shifts:  In: 353 [P.O.:350; I.V.:3]  Out: -   Distal extremity CMSI bilaterally.  Calves are negative bilaterally, both soft and nontender.  The dressing is C/D/I.  Very pleasant and alert      A: Ms. Kline is doing well status post Procedure(s):  Cemented hemiarthroplasty, right hip..    P: Continue physical therapy. Continue DVT pphx with ASA due to high mobility and low risk factors for DVT. Anticipate discharge to TCU today.    Holly Ge PA-C  [phone]  "

## 2022-08-30 NOTE — LETTER
[facility]   To:       Please give to facility    From:   DAYRON Nuñez    Care Coordinator   [facility]   P: [phone] [email or web address]   Patient Name:  Marie Kline YOB: 1932   Admit date: 8/30/2022      *Information Needed:  Please contact me when the patient will discharge (or if they will move to long term care)- include the discharge date, disposition, and main diagnosis   - If the patient is discharged with home care services, please provide the name of the agency    Also- Please inform me if a care conference is being held.   Phone, Fax or Email with information                Thank you

## 2022-08-30 NOTE — PLAN OF CARE
Physical Therapy Discharge Summary    Reason for therapy discharge:    Discharged to transitional care facility.    Progress towards therapy goal(s). See goals on Care Plan in [facility] electronic health record for goal details.  Goals partially met.  Barriers to achieving goals:   discharge from facility.    Therapy recommendation(s):    Continued therapy is recommended.  Rationale/Recommendations:  TCU recommended for further progression of safety with functional mobility.

## 2022-08-30 NOTE — PROGRESS NOTES
LATE NOTE     SW received call from [facility] inquiring about other locations patient was accepted at. It appears that patient was accepted at [place], but this SW is unsure if it was a private or shared room.     DAVID Olmos, [facility]  Emergency Room   [phone]-Please contact the SW on the floor in which the patient is staying for any questions or concerns

## 2022-08-30 NOTE — PROGRESS NOTES
Oxygen Documentation:   I certify that this patient, Marie Kline has been under my care (or a nurse practitioner or physican's assistant working with me). This is the face-to-face encounter for oxygen medical necessity.      Marie Kline is now in a chronic stable state and continues to require supplemental oxygen. Patient has continued oxygen desaturation due to COPD J44.9.    Alternative treatment(s) tried or considered and deemed clinically infective for treatment of Chronic Respiratory Failure with Hypoxia J96.11  COPD J44.9 include nebulizers and inhalers.  If portability is ordered, is the patient mobile within the home? yes

## 2022-08-30 NOTE — PROGRESS NOTES
"[facility]    House [facility] RRT Note  12/18/2020   Time Called: 2052    RRT called for: Chest pain    Assessment & Plan     LSB squeezing chest pain, atypical.  Differential diagnoses considered ACS, PE, GERD, MSK  - Upon arrival, pt lying in bed, awake, alert, in no apparent distress.  With assistance of prabhu Sheets , pt reports \"heart pain\", pointing just to the left of lower sternal border.  Pt previously described pain as squeezing to nursing staff, currently states \"heart pain\".  Pt reports no nausea, SOB, diaphoresis, radiation of pain.  Pt reports dizziness; however, notes has been dizzy since admission.  Pt's O2 sats 100% on RA, HR 70s, SBP 140s, RR low 20s, afebrile.      INTERVENTIONS:  - Stat EKG previously ordered by hospitalist, no acute ischemic T wave or ST changes noted  - Stat trop with trend x3 previously ordered by hospitalist; negative x2 this admission  - Nursing administering PRN APAP now; could trial TUMs vs nitroglycerin if pain persists    At the end of the RRT pt remains hemodynamically stable, in no apparent distress    Discussed with and defer further cares to nursing and hospitalist     Interval History     Toshia Vela is a 74 year old female who was admitted on 12/18/2020 for N/V, dizziness.    Medical history significant for: CVA, asthma, HTN, HLP, chronic headaches, GERD    Code Status: Full Code    Allergies   No Known Allergies    Physical Exam   Vital Signs with Ranges:  Temp:  [97.9  F (36.6  C)-98.9  F (37.2  C)] 98.9  F (37.2  C)  Pulse:  [62-75] 75  Resp:  [12-24] 24  BP: (118-155)/(49-83) 143/83  SpO2:  [97 %-100 %] 100 %  I/O last 3 completed shifts:  In: 240 [P.O.:240]  Out: -     Constitutional: Pt lying in bed, awake, alert, in no apparent distress  Neck: No upper airway wheezes or stridor noted  Pulmonary: In no apparent respiratory distress, clear to auscultation bilaterally, no crackles or wheezes noted  Cardiovascular: Regular rate " Clinic Care Coordination Contact  Care Coordination Transition Communication    Clinical Data: Patient was hospitalized at [facility] from 8/25/2022 to 8/30/2022 with diagnosis of Mechanical fall complicated by right femoral neck fracture s/p surgical fix.     Transition to Facility:              Facility Name: [facility]               Contact name and phone number/fax: [phone]    Plan: RN/SW Care Coordinator will await notification from facility staff informing RN/SW Care Coordinator of patient's discharge plans/needs. RN/SW Care Coordinator will review chart and outreach to facility staff every 4 weeks and as needed.     DAVID Nuñez/[facility]aineGeneral Medical CenterSW  Social Work Care Coordinator  [facility] - [place], [place], and [place]  Phone: [phone]       and rhythm, normal S1S2, no murmur, rub or gallop noted  GI: Soft, nondistended  Skin/Integumen: Warm, dry  Neuro: Awake, alert, with assistance of [place] , pt able to follow commands x4 extremities, communicate needs  Psych:  Calm  Extremities: No peripheral edema noted    Data     EKG:  Interpreted by DEONDRE Lopez CNP  Time reviewed: 2100  Symptoms at time of EKG: LSB chest pain   Rhythm: normal sinus   Rate: Normal  Axis: Normal  Ectopy: none  Conduction: normal  ST Segments/ T Waves: No ST-T wave changes and No acute ischemic changes  Q Waves: none  Comparison to prior: Unchanged from 12/17    Clinical Impression: no acute changes      Troponin:    Recent Labs   Lab Test 12/18/20  2100   TROPI <0.015       Time Spent on this Encounter   I spent 15 minutes on the unit/floor managing the care of Toshia Vela. Over 50% of my time was spent counseling the patient and/or coordinating care regarding services listed in this note.    DEONDRE Lopez [facility]

## 2022-08-30 NOTE — PLAN OF CARE
Goal Outcome Evaluation:    Patient vital signs are at baseline: Yes, 2 L with NC at baseline  Patient able to ambulate as they were prior to admission or with assist devices provided by therapies during their stay:  Yes, A1 with walker/GB  Patient MUST void prior to discharge:  Yes  Patient able to tolerate oral intake:  Yes, easy to chew diet, pt's dentures not here  Pain has adequate pain control using Oral analgesics:  Yes, denies pain  Does patient have an identified :  No,  Reason:  Plan to discharge to TCU  Has goal D/C date and time been discussed with patient:  Yes    Pt A/O x4. Dressing to right hip with scant drainage. CMS intact. Elevated WBC at 77.9, baseline for pt. Sepsis protocol triggered around 2300, lactic 0.8. Hopeful discharge to TCU today. Will continue to monitor.

## 2022-08-30 NOTE — DISCHARGE SUMMARY
[facility]  Discharge Summary  Name: Marie Kline    MRN: [id number]  YOB: 1932    Age: 89 year old  Date of Discharge:  8/30/2022  Date of Admission: 8/25/2022  Primary Care Provider: Be Dumont  Discharge Physician:  David Ames DO  Discharging Service:  Hospitalist      Hospital Course  Summary of Stay: Marie Kline is a 89 year old female PMH notable for CLL with WBC ranges from 50,000 150,000, COPD with chronic hypoxic respiratory failure on 2 L nasal cannula, essential hypertension, dysphagia, cognitive impairment, remote history of Takotsubo cardiomyopathy which is resolved who is admitted on 8/25/2022 with mechanical fall complicated by right femoral neck fracture.     Orthopedic surgery was consulted and the patient is now status post surgical fix.  She is working with physical therapy who have cleared her for discharge home with assist, home with physical therapy.  However, the patient is not able to receive 24-hour assist at home and therefore plan will be to discharge to TCU.      Discharge Diagnoses:  Mechanical fall complicated by right femoral neck fracture s/p surgical fix:  Presents with right leg pain.  Orthopedic surgery was consulted and the patient is now status post surgical fix.  Operative course was uncomplicated.  Patient has been evaluated physical therapy who are recommending home with assist, home with physical therapy.  Patient historically resides at an assisted living facility but is usually independent.  She does not have 24 hour assist.  Will discharge to TCU.     COPD with chronic hypoxemic respiratory failure:  Patient is chronically on 2 L nasal cannula.  She is currently on a prednisone taper that was started during her recent hospitalization.  This is being continued while here (to end 9/1).     CLL with chronic leukocytosis, anemia, thrombocytopenia:  WBC typically ranges from 50,000 150,000.  She is within that range here.   Hgb a  bit lower than baseline, but no evidence for active bleeding.  Patient takes IVIG as an outpatient for diagnosis of hypogammaglobulinemia.  I discussed the case with oncology who report that it is okay for the patient not to take this infusion while she is at TCU.     Mild dysphagia:  Historically on DD3 diet at [facility].  Is also report that she uses regular diet at home.  It appears that she is tolerating regular diet here.     Chronic mild hyponatremia:  Sodium level 128 on 8/26.  This is lower than her historic baseline.  Suspect that this is largely insignificant.  Now near normal.      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 BMI 16. Wt 84 lbs. Diffuse muscle wasting including temporal region. Diffuse subcutaneous fat loss.       Discharge Disposition:  Discharged to TCU    Allergies:  Allergies   Allergen Reactions     Diagnostic X-Ray Materials Hives     CT DYE     Contrast Dye Hives     CT DYE     Prolia [Denosumab]      Osteonecrosis jaw       Rocephin [Ceftriaxone] Itching     Wound Dressing Adhesive         Discharge Medications:        Review of your medicines      START taking      Dose / Directions   aspirin 81 MG chewable tablet  Commonly known as: ASA  Replaces: aspirin 81 MG EC tablet      Dose: 81 mg  Take 1 tablet (81 mg) by mouth daily  Refills: 0     bisacodyl 10 MG suppository  Commonly known as: DULCOLAX      Dose: 10 mg  Place 1 suppository (10 mg) rectally daily as needed for constipation  Refills: 0     methocarbamol 500 MG tablet  Commonly known as: ROBAXIN      Dose: 500 mg  Take 1 tablet (500 mg) by mouth 4 times daily as needed for muscle spasms  Refills: 0     polyethylene glycol 17 GM/Dose powder  Commonly known as: MIRALAX      Dose: 17 g  Take 17 g by mouth 2 times daily While on narcotics to prevent constipation  Quantity: 510 g  Refills: 0     senna-docusate 8.6-50 MG tablet  Commonly known as: SENOKOT-S/PERICOLACE      Dose: 1 tablet  Take 1  tablet by mouth 2 times daily as needed for constipation  Quantity: 30 tablet  Refills: 0     traMADol 50 MG tablet  Commonly known as: ULTRAM      Dose: 25-50 mg  Take 0.5-1 tablets (25-50 mg) by mouth every 6 hours as needed for moderate to severe pain (Take 0.5 tab (25 mg) for pain 6-8. Take 1 tab (50 mg) for pain 9-10.)  Quantity: 20 tablet  Refills: 0        CONTINUE these medicines which may have CHANGED, or have new prescriptions. If we are uncertain of the size of tablets/capsules you have at home, strength may be listed as something that might have changed.      Dose / Directions   acetaminophen 325 MG tablet  Commonly known as: TYLENOL  This may have changed: See the new instructions.      Take 2 tablets (650 mg) by mouth 4 times daily. May also take 1-2 tablets (325-650 mg) 2 times daily as needed for fever or pain.  Refills: 0     albuterol 108 (90 Base) MCG/ACT inhaler  Commonly known as: PROAIR HFA/PROVENTIL HFA/VENTOLIN HFA  This may have changed:     when to take this    reasons to take this  Used for: Chronic obstructive pulmonary disease, unspecified COPD type (H), Chronic respiratory failure with hypoxia (H)      Dose: 2 puff  Inhale 2 puffs into the lungs every 6 hours  Quantity: 1 each  Refills: 11        CONTINUE these medicines which have NOT CHANGED      Dose / Directions   ACE/ARB/ARNI NOT PRESCRIBED  Commonly known as: INTENTIONAL  Used for: Chronic congestive heart failure, unspecified heart failure type (H)      Please choose reason not prescribed from choices below.  Refills: 0     budesonide 0.5 MG/2ML neb solution  Commonly known as: PULMICORT  Used for: Chronic obstructive pulmonary disease, unspecified COPD type (H)      Dose: 0.5 mg  Take 2 mLs (0.5 mg) by nebulization 2 times daily  Quantity: 120 mL  Refills: 11     escitalopram 10 MG tablet  Commonly known as: LEXAPRO  Used for: LESLY (generalized anxiety disorder)      Dose: 10 mg  Take 1 tablet (10 mg) by mouth daily  Quantity: 30  tablet  Refills: 3     ferrous sulfate 325 (65 Fe) MG tablet  Commonly known as: FEROSUL      Dose: 325 mg  Take 325 mg by mouth daily (with breakfast)  Refills: 0     furosemide 20 MG tablet  Commonly known as: LASIX      Dose: 20 mg  Take 20 mg by mouth daily  Refills: 0     levothyroxine 88 MCG tablet  Commonly known as: SYNTHROID/LEVOTHROID  Used for: Acquired hypothyroidism      Dose: 88 mcg  Take 1 tablet (88 mcg) by mouth daily  Quantity: 90 tablet  Refills: 0     loperamide 2 MG tablet  Commonly known as: IMODIUM A-D      Dose: 2 mg  Take 2 mg by mouth every 4 hours as needed for diarrhea  Refills: 0     metoprolol succinate ER 25 MG 24 hr tablet  Commonly known as: TOPROL XL  Used for: ACS (acute coronary syndrome) (H)      Dose: 25 mg  Take 1 tablet (25 mg) by mouth daily  Quantity: 90 tablet  Refills: 2     Multivitamin Adult Chew      Dose: 2 chew tab  Take 2 chew tab by mouth daily  Refills: 0     Octagam 5 GM/100ML Soln  Generic drug: Immune Globulin (Human)      into the vein every 6 weeks.    Hold this medication while in TCU-resume at discharge from TCU  Refills: 0     * predniSONE 10 MG tablet  Commonly known as: DELTASONE      Dose: 20 mg  Take 20 mg by mouth daily For 3 days, 8/25/22-8/27/22  Refills: 0     * predniSONE 10 MG tablet  Commonly known as: DELTASONE      Dose: 10 mg  Take 10 mg by mouth daily For 5 days 8/28-9/1/22  Refills: 0     Revefenacin 175 MCG/3ML Soln  Used for: Chronic obstructive pulmonary disease, unspecified COPD type (H)      Dose: 175 mcg  Inhale 3 mLs (175 mcg) into the lungs daily  Quantity: 90 mL  Refills: 11     traZODone 50 MG tablet  Commonly known as: DESYREL  Used for: Sleep difficulties      Dose: 50 mg  Take 1 tablet (50 mg) by mouth At Bedtime  Quantity: 90 tablet  Refills: 0         * This list has 2 medication(s) that are the same as other medications prescribed for you. Read the directions carefully, and ask your doctor or other care provider to review  "them with you.            STOP taking    aspirin 81 MG EC tablet  Replaced by: aspirin 81 MG chewable tablet              Where to get your medicines      Some of these will need a paper prescription and others can be bought over the counter. Ask your nurse if you have questions.    Bring a paper prescription for each of these medications    traMADol 50 MG tablet         Condition on Discharge:  Discharge condition: Good       Code status on discharge: DNR / DNI     History of Illness:  See detailed admission note for full details.    Physical Exam:  Vital signs:  Temp: 98.4  F (36.9  C) Temp src: Temporal BP: (!) 165/68 Pulse: 74   Resp: 16 SpO2: 96 % O2 Device: Nasal cannula Oxygen Delivery: 2 LPM Height: 149.9 cm (4' 11\") Weight: 38.1 kg (84 lb)  Estimated body mass index is 16.97 kg/m  as calculated from the following:    Height as of this encounter: 1.499 m (4' 11\").    Weight as of this encounter: 38.1 kg (84 lb).    Wt Readings from Last 1 Encounters:   08/26/22 38.1 kg (84 lb)     General: Alert, awake, no acute distress.  HEENT: NC/AT, eyes anicteric, external occular movements intact, face symmetric.    Cardiac: RRR, S1, S2.  No murmurs appreciated.  Pulmonary: Normal work of breathing.  Lungs CTAB.  Abdomen: soft, non-tender, non-distended.  Bowel sounds present.  No guarding.  Extremities: no deformities.  Warm, well perfused.  Right hip bandage is clean dry and intact without significant bruising or surrounding erythema/swelling  Skin: no rashes or lesions noted.  Warm and dry.  Neuro: No gross deficits noted.  Speech clear.    Psych: Appropriate affect.    Procedures other than Imaging:  Cemented hemiarthroplasty on the right hip     Imaging:  No results found for this or any previous visit (from the past 24 hour(s)).     Consultations:  Consultation during this admission received from orthopedics.       Recent Lab Results:  Recent Labs   Lab 08/29/22  0617 08/28/22  1250 08/28/22  0656 08/27/22  0600 " 08/26/22  1211 08/25/22  1223   WBC 77.9*  --   --   --  86.3* 92.6*   HGB 7.7* 8.0* 7.0*   < > 8.6* 8.8*   HCT 26.4*  --   --   --  30.1* 30.6*     --   --   --  101* 99   PLT 58*  --   --   --  71* 74*    < > = values in this interval not displayed.          Lab Results   Component Value Date     08/29/2022     08/26/2022     08/25/2022     07/07/2021     07/06/2021     07/05/2021    Lab Results   Component Value Date    CHLORIDE 99 08/29/2022    CHLORIDE 93 08/26/2022    CHLORIDE 93 08/25/2022    CHLORIDE 99 05/16/2022    CHLORIDE 99 03/16/2022    CHLORIDE 97 09/23/2021    CHLORIDE 102 07/07/2021    CHLORIDE 98 07/06/2021    CHLORIDE 95 07/05/2021    Lab Results   Component Value Date    BUN 12.2 08/29/2022    BUN 12.3 08/26/2022    BUN 12.0 08/25/2022    BUN 15 05/16/2022    BUN 13 03/16/2022    BUN 16 09/23/2021    BUN 22 07/07/2021    BUN 15 07/06/2021    BUN 18 07/05/2021      Lab Results   Component Value Date    POTASSIUM 4.3 08/29/2022    POTASSIUM 5.0 08/26/2022    POTASSIUM 4.7 08/25/2022    POTASSIUM 4.3 05/16/2022    POTASSIUM 4.6 03/16/2022    POTASSIUM 3.5 09/23/2021    POTASSIUM 3.9 07/07/2021    POTASSIUM 3.9 07/06/2021    POTASSIUM 3.0 07/06/2021    Lab Results   Component Value Date    CO2 32 08/29/2022    CO2 33 08/26/2022    CO2 34 08/25/2022    CO2 30 05/16/2022    CO2 31 03/16/2022    CO2 33 09/23/2021    CO2 35 07/07/2021    CO2 33 07/06/2021    CO2 34 07/05/2021    Lab Results   Component Value Date    CR 0.60 08/29/2022    CR 0.69 08/26/2022    CR 0.70 08/25/2022    CR 0.78 07/07/2021    CR 0.83 07/06/2021    CR 0.89 07/05/2021             Pending Results:    Unresulted Labs Ordered in the Past 30 Days of this Admission     Date and Time Order Name Status Description    8/26/2022 10:35 AM Surgical Pathology Exam In process            Discharge Instructions and Follow-Up:   Discharge Procedure Orders   Anti-Embolism Stockings   Order Comments:  Bilateral below knee length.On in the morning, off at night     Primary Care - Care Coordination Referral   Standing Status: Future   Referral Priority: Routine: Next available opening Referral Type: Care Coordination   Number of Visits Requested: 1     General info for SNF   Order Comments: Length of Stay Estimate: Short Term Care: Estimated # of Days <30  Condition at Discharge: Improving  Level of care:skilled   Rehabilitation Potential: Good  Admission H&P remains valid and up-to-date: Yes  Recent Chemotherapy: N/A  Use Nursing Home Standing Orders: Yes     Activity - Up with assistive device   Order Comments: Underwent right hemiarthroplasty after hip fracture.  She may WBAT with assistive device.   Posterior hip precautions.     Order Specific Question Answer Comments   Is discharge order? Yes      Follow Up and recommended labs and tests   Order Comments: Please follow-up with Dr Yashira Melendez, [facility], in clinic for a 2 week post-op appointment.  At this visit, your post-op dressing will be removed and any staples or sutures taken out.  Please call Dr Ferguson' patient coordinator, Aydee, to schedule this appointment and also with any questions or concerns.  She can be reached during normal business hours (M-F, 8am-5pm) at [phone].   If you have any questions or concerns after-hours, please contact the on-call surgeon at [phone].     Reason for your hospital stay   Order Comments: Mechanical fall with resulting right hip fracture s/p right hemiarthroplasty.  Advanced COPD on chronic oxygen.  Chronic CLL.     Symptoms - Constipation management   Order Comments: Constipation (hard, dry bowel movements) is not uncommon after an orthopedic injury due to the combination of being less active and opioid pain medication.  You can do the following to help reduce constipation:  ~  FLUIDS:  Drink clear liquids (water or Gatorade), or juice (apple/prune).  ~  DIET:  Eat  a fiber rich diet.    ~  ACTIVITY:  Get up and move around several times a day.  Increase your activity as you are able.  MEDICATIONS:  Reduce the risk of constipation by starting medications before you are constipated.  You can take Miralax (1 capful mixed in at least 8 oz fluids, 1-2 times per day) and/or a laxative (Senokot-S, 1-2 tablets taken 1-2 times a day).  Do not take bowel meds if you have loose stools.  If you already have constipation and these medications are not working, you try over-the-counter milk of magnesia, Dulcolax suppositories, or magnesium citrate (use as per package instructions).  Call your primary care provider if it has been greater than 3 days since your last bowel movement.     Medication instructions -  Anticoagulation - aspirin   Order Comments: Continue Aspirin 81 daily as you normally take at home.  This will help minimize your risk of a post-op blood clot.     Activity - Exercises to prevent blood clots   Order Comments: Unless otherwise directed by your Surgeon team, perform the following exercises at least three times per day for the first four weeks after surgery to prevent blood clots in your legs: 1) Point and flex your feet (Ankle Pumps), 2) Move your ankle around in big circles, 3) Wiggle your toes, 4) Walk, even for short distances, several times a day, will help decrease the risk of blood clots.     Order Specific Question Answer Comments   Is discharge order? Yes      Comfort and Pain Management - Pain after Surgery   Order Comments: Pain after surgery is normal and expected.  You will have some amount of pain for several weeks after surgery.  Your pain will improve with time.  There are several things you can do to help reduce your pain including: rest, ice, elevation, and using pain medications as needed. Contact your Surgeon Team if you have pain that persists or worsens after surgery despite rest, ice, elevation, and taking your medication(s) as prescribed. Contact  "your Surgeon Team if you have new numbness, tingling, or weakness in your operative extremity.     Comfort and Pain Management - LOWER Extremity Elevation   Order Comments: Swelling is expected for several weeks-to-months after an injury or surgery. This type of swelling is usually associated with gravity and activity, and can be improved with elevation.  The best way to decrease the swelling is to elevate your leg with your \"toes above your nose\".  It may be easiest to achieve this position by laying down and placing several pillows lengthwise under your calf and heel. When elevating your leg keep your knee completely straight.  Elevate your leg as often as possible especially for the first 2-3 weeks after injury or surgery.     Comfort and Pain Management - Cold therapy   Order Comments: Ice can be used to control swelling and discomfort after injury or surgery  Place a thin towel over your affected joint and lay an ice pack or bag or frozen veggies overtop.  Leave the ice pack in place for 20-30 minutes, then remove and leave off for 20 minutes. Repeat this 20 minutes on - 20 minutes off routine as often as tolerated.  Monitor skin for any signs of frostbite (skin that stays white and numb even after the ice has been removed for 20 minutes, etc).     Medication Instructions - Acetaminophen (TYLENOL) Instructions   Order Comments: If tolerated, please use acetaminophen (TYLENOL) for pain relief.  This can be used in addition to an NSAID and an opiate/narcotic medication.  Acetaminophen is most effective for managing pain symptoms when taken on a schedule  (every four, six, or eight hours).  For people without liver disease, max acetaminophen dose is 4000 mg in 24 hours from all medications.  Do NOT exceed this daily dose.  Most patients use acetaminophen for pain control for the first four weeks after injury.  You can wean from this medication as your pain decreases.     Medication Instructions - Opioid " Instructions (0.5 - 1 tablet Q 4-6 hours, MAX 4 tablets)   Order Comments: You are discharging with an opioid medication (tramadol). This medication should only be taken for severe, breakthrough pain that is not controlled with acetaminophen (TYLENOL). If you rate your pain less than 5, you do not need this medication.  Pain rating 0-5:  You do not need a narcotic/opiate medication.  Use acetaminophen, elevation, ice, and rest for pain relief.                Pain rating 6-8:  Take 1/2 tablet every 4-6 hours as needed                Pain rating 9-10:  Take 1 tablet every 4-6 hours as needed                Do not exceed 4 tablets per day.  Opiates / narcotics will cause constipation and sleepiness.  No driving if taking this medication.  Use sparingly and with caution - narcotics are known to be habit-forming / addictive.  If you find you are having a hard time weaning off this medication or are concerned that you have developed a dependence, it is very important that you talk to your primary care provider to discuss strategies and create a plan. DO NOT drink alcohol if taking narcotics (such as tramadol) or other sedating medications.     Medication Instructions - Opioids - Tapering Instructions   Order Comments: In the first three days following surgery, your symptoms may warrant use of the narcotic pain medication every four to six hours as prescribed. This is normal. As your pain symptoms improve, focus your efforts on decreasing (tapering) use of narcotic medications. The most successful tapering strategy is to first, decrease the number of tablets you take every 4-6 hours to the minimum prescribed. Then, increase the amount of time between doses.  For example:  First, taper to   or 1 tablet every 4-6 hours.  Then, taper to   or 1 tablet every 6-8 hours.  Then, taper to   or 1 tablet every 8-10 hours.  Then, taper to   or 1 tablet every 10-12 hours.  Then, taper to   or 1 tablet at bedtime.  The bedtime dose can  help with comfort during sleep and is typically the last dose to be discontinued after surgery.     Return to Driving   Order Comments: Return to driving - Driving is NOT permitted until directed by your provider. Under no circumstance are you permitted to drive while using narcotic pain medications.     Order Specific Question Answer Comments   Is discharge order? Yes      Weight bearing as tolerated   Order Comments: Weight bearing as tolerated on your operative extremity.     Order Specific Question Answer Comments   Is discharge order? Yes      Dressing / Wound Care - Wound   Order Comments: You have a clean waterproof dressing on your surgical wound.  Please leave dressing intact until you are seen for your 2 week post-op appointment.  You may shower with the dressing on, just make sure the shower head doesn't hit the dressing directly.  OK for soapy water to run over the dressing.  Pat dry after shower.  Reinforce rolling or peeling edges with tape as needed.                                 If you are at a care facility and are unable to make your 2 week appointment, your care team can help with the post-op dressing and staple removal.  On post-op day 14, your care team can remove the surgical dressing and also remove any staples or sutures.  After these are removed from your incision, you no longer need to cover your incision unless you have some bleeding from the staple sites or rubbing from clothing.  DO NOT apply any ointments, lotions, or creams to your incision.                         Contact your Surgical Team if you have increased redness, warmth around the surgical   wound, and/or drainage from the surgical wound.     Dressing / Wound Care - NO Tub Bathing   Order Comments: Tub bathing, swimming, or any other activities that will cause your incision to be submerged in water should be avoided until allowed by your Surgeon.     No hyperextension   Order Comments: No kicking-back or lunging with  operative leg.     Order Specific Question Answer Comments   Is discharge order? Yes      No flexion past 90 degrees   Order Comments: No bending at waist past 90 degrees.     Order Specific Question Answer Comments   Is discharge order? Yes      No adduction past midline   Order Comments: No crossing your operative leg.     Order Specific Question Answer Comments   Is discharge order? Yes      Follow Up and recommended labs and tests   Order Comments: Follow up with Nursing home physician.  No follow up labs or test are needed.     General info for SNF   Order Comments: Length of Stay Estimate: Short Term Care: Estimated # of Days <30  Condition at Discharge: Improving  Level of care:skilled   Rehabilitation Potential: Good  Admission H&P remains valid and up-to-date: Yes  Recent Chemotherapy: N/A  Use Nursing Home Standing Orders: Yes     Mantoux instructions   Order Comments: Give two-step Mantoux (PPD) Per Facility Policy Yes     Reason for your hospital stay   Order Comments: You were hospitalized for a fall that was complicated by a femur fracture. This was treated with surgery. You will discharge to a rehabilitation facility for ongoing rehab.     Activity - Up with nursing assistance     Order Specific Question Answer Comments   Is discharge order? Yes      Physical Therapy Adult Consult   Order Comments: Evaluate and treat as clinically indicated.    Reason:  Underwent right hemiarthroplasty after hip fracture.  She may WBAT with assistive device.   Posterior hip precautions.     Occupational Therapy Adult Consult   Order Comments: Evaluate and treat as clinically indicated.    Reason:  Underwent right hemiarthroplasty after hip fracture.  She may WBAT with assistive device.   Posterior hip precautions.     Physical Therapy Adult Consult   Order Comments: Evaluate and treat as clinically indicated.    Reason:  Femur fracture     Occupational Therapy Adult Consult   Order Comments: Evaluate and treat as  clinically indicated.    Reason:  Femur fracture     Fall precautions     Fall precautions     Hip precautions   Order Comments: Posterior hip precautions x6 weeks     Fall precautions     Crutches DME   Order Comments: DME Documentation: Describe the reason for need to support medical necessity: Impaired gait status post hip surgery. I, the undersigned, certify that the above prescribed supplies are medically necessary for this patient and is both reasonable and necessary in reference to accepted standards of medical practice in the treatment of this patient's condition and is not prescribed as a convenience.     Order Specific Question Answer Comments   DME Provider: [place]    Crutch Type: Standard    Crutches Add On: NA    Length of Need: Lifetime      Cane DME   Order Comments: DME Documentation: Describe the reason for need to support medical necessity: Impaired gait status post hip surgery. I, the undersigned, certify that the above prescribed supplies are medically necessary for this patient and is both reasonable and necessary in reference to accepted standards of medical practice in the treatment of this patient's condition and is not prescribed as a convenience.     Order Specific Question Answer Comments   DME Provider: [place]    Cane Type: Single Tip    Reminder: Patient can typically get 1 every 5 years      Walker DME   Order Comments: : DME Documentation: Describe the reason for need to support medical necessity: Impaired gait status post hip surgery. I, the undersigned, certify that the above prescribed supplies are medically necessary for this patient and is both reasonable and necessary in reference to accepted standards of medical practice in the treatment of this patient's condition and is not prescribed as a convenience.     Order Specific Question Answer Comments   DME Provider: [place]    Walker Type: Standard (2 Wheel)    Accessories: N/A      Oxygen Adult/Peds   Order  Comments: Oxygen Documentation:   I certify that this patient, Marie Kline has been under my care (or a nurse practitioner or physican's assistant working with me). This is the face-to-face encounter for oxygen medical necessity.      Marie Kline is now in a chronic stable state and continues to require supplemental oxygen. Patient has continued oxygen desaturation due to COPD J44.9.    Alternative treatment(s) tried or considered and deemed clinically infective for treatment of Chronic Respiratory Failure with Hypoxia J96.11  COPD J44.9 include nebulizers and inhalers.  If portability is ordered, is the patient mobile within the home? yes    **Patients who qualify for home O2 coverage under the CMS guidelines require ABG tests or O2 sat readings obtained closest to, but no earlier than 2 days prior to the discharge, as evidence of the need for home oxygen therapy. Testing must be performed while patient is in the chronic stable state. See notes for O2 sats.**     Order Specific Question Answer Comments   DME Provider: [place]    Type: Resume    Did the patient have SpO2 (sat) testing (only needed for new oxgyen or liter flow changes)? No    Length of Need: Lifetime    Frequency of Use: Continuous    Mode of Delivery - Continuous Nasal Cannula    Liter Flow - Continuous (LPM): 2    Need for Portability: Yes    Evaluate for Conserving Device: Yes    Maintain Sats >= 90%    The face to face evaluation was performed on: 8/30/2022      Advance Diet as Tolerated   Order Comments: Resume regular diet, easy chew (level 7), thin liquids.  Dietary supplements between meals     Order Specific Question Answer Comments   Is discharge order? Yes      Diet   Order Comments: Follow this diet upon discharge: Orders Placed This Encounter      Easy to Chew Diet (level 7)      Advance Diet as Tolerated     Order Specific Question Answer Comments   Is discharge order? Yes        Total time spent in face to face contact with  the patient and coordinating discharge was:  >30 Minutes.

## 2022-08-30 NOTE — PROGRESS NOTES
Care Management Follow Up    Length of Stay (days): 5    Expected Discharge Date: 08/30/2022     Concerns to be Addressed: care coordination/care conferences, discharge planning     Patient plan of care discussed at interdisciplinary rounds: Yes    Anticipated Discharge Disposition: Transitional Care     Anticipated Discharge Services: None  Anticipated Discharge DME: None    Patient/family educated on Medicare website which has current facility and service quality ratings: yes  Education Provided on the Discharge Plan:  Yes, patient and son-in-lawWenceslao  Patient/Family in Agreement with the Plan: yes    Referrals Placed by CM/SW: Post Acute Facilities  Private pay costs discussed: Not applicable    PAS [id number]  Additional Information:  Received voice message from Margaret, pt daughter, stating she is out of town and prefers CC calls her , Wenceslao.     Met with patient for update on TCU choices. Patient okay with CC talking with her son-in-law. CC called Wenceslao and given update [facility] and [facility] has accepted for TCU. Pt son-in-law selected [facility]. He states he will be available to provide transportation today.     Spoke with [facility] allen, [facility], ph# [phone]. They are able to accept patient to a shared room today. Discharge instructions received and faxed to [facility]. Notified [facility], pt/family selected [facility]. [facility] P:[phone], also updated on TCU plan.     Son-in-law, Wenceslao, will provide transportation today around 3 pm.     Vaishnavi Garcias RN Case Manager  Inpatient Care Coordination   [facility]   [phone]      Vaishnavi Garcias RN

## 2022-08-31 PROBLEM — E43 UNSPECIFIED SEVERE PROTEIN-CALORIE MALNUTRITION (H): Status: ACTIVE | Noted: 2022-01-01

## 2022-08-31 NOTE — PLAN OF CARE
"Occupational Therapy Discharge Summary    Reason for therapy discharge:    Discharged to transitional care facility.    Progress towards therapy goal(s). See goals on Care Plan in [facility] electronic health record for goal details.  Goals not met.  Barriers to achieving goals:   limited tolerance for therapy and discharge from facility.    Therapy recommendation(s):    Continued therapy is recommended.  Rationale/Recommendations:  per treating OT \"pt below baseline function in self care and ADL. lives alone, cares for all ADL and most IADL indp in ILF. currently not able to care for self as pLOF. will need TCU at discharge. if home then would need Ax1 for all transfer, mobility, self care and IADL. appears pt has all equipt at home at this time\".    **This patient was not seen by writing OT, information for discharge summary taken from treating OT's notes.**    "

## 2022-08-31 NOTE — PROGRESS NOTES
[facility] GERIATRICS    PRIMARY CARE PROVIDER AND CLINIC:  [facility] [address]  Chief Complaint   Patient presents with     Hospital F/U      [facility] Medical Record Number:  [id number]  Place of Service where encounter took place:  [facility] ([facility] [[id number]]    Marie Kline  is a 89 year old  (4/28/1932), admitted to the above facility from  [facility]. Hospital stay 8/25/22 through 8/30/22..   HPI:    Marie Kline is a 89-year-old female with a past medical history of oxygen dependent COPD, CLL, thrombocytopenia, and history of Takotsubo cardiomyopathy who was admitted to [facility] following a hospitalization for right femoral neck fracture.  She had recently been hospitalized at [facility] 8/6-8/2011 for acute on chronic respiratory failure due to COPD exacerbation.  Hospital course was complicated by weakness so she was discharged to TCU and subsequently back to her independent living on 8/22.  Unfortunately shortly after discharge to independent living she suffered a mechanical fall and was brought back to the emergency department.  She was found to have a right femoral neck fracture and underwent surgical fixation by Dr. Ferguson 8/26.  Postoperative course was uncomplicated and she is now discharged to [facility].    Today patient is seen in her hospital room.  Additional history is discussed with social work and nursing staff.  She is upset that she is not in a private room.  There was briefly a plan for her to transition to a new care facility later this week but she has now elected to stay at [facility].  She is up sitting in her chair.  Does complain of some pain in her right hip but notes tramadol is working.  She is on her baseline oxygen of 2 L.  She denies chest pain or shortness of breath.  Appetite is good.  Denies dizziness or lightheadedness.  Review of hospital MAR indicates she did not use any as needed  Robaxin.  She denies constipation.  No abdominal pain.    CODE STATUS/ADVANCE DIRECTIVES DISCUSSION:  Prior  DNR / DNI  ALLERGIES:   Allergies   Allergen Reactions     Diagnostic X-Ray Materials Hives     CT DYE     Contrast Dye Hives     CT DYE     Prolia [Denosumab]      Osteonecrosis jaw       Rocephin [Ceftriaxone] Itching     Wound Dressing Adhesive       PAST MEDICAL HISTORY:   Past Medical History:   Diagnosis Date     Arthritis     Generalized     Bladder cancer (H)      CLL (chronic lymphocytic leukemia) (H)      Cognitive impairment     16/30 slums     Congestive heart failure (H) 10/24/2014    flash pulm edema ass w/Takotsubo     COPD (chronic obstructive pulmonary disease) (H)     chronic O2 2L/NC     Dysphagia     on DD3     Hypertension      Hypothyroid      Mumps      Pulmonary edema cardiac cause (H) 10/08/2014    associated w/Takotsubo ACS     Stress-induced cardiomyopathy 10/2018    cath with minimal dz,  EF 30-35 %improved to 60-65 % by echo 3/2021     Tricuspid valve disorders, 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    TR 2-3+ per echo      PAST SURGICAL HISTORY:   has a past surgical history that includes Coronary Angiography Adult Order (10/2014); Cystoscopy, biopsy bladder, combined (N/A, 2/9/2016); Cystoscopy, transurethral resection (TUR) tumor bladder, combined (N/A, 2/9/2016); Esophagoscopy, gastroscopy, duodenoscopy (EGD), combined (N/A, 6/22/2016); Cystoscopy; Bladder surgery; Biopsy Lymph Node Inguinal (Right, 10/23/2018); Open reduction internal fixation hip bipolar (Left, 11/19/2018); Left Heart Catheterization (N/A, 12/11/2018); and Open reduction internal fixation hip unipolar (Right, 8/26/2022).  FAMILY HISTORY: family history includes Cancer in her father; Cerebrovascular Disease in her mother.  SOCIAL HISTORY:   reports that she quit smoking about 26 years ago. Her smoking use included cigarettes. She has never used smokeless tobacco. She reports that she does not drink alcohol and  does not use drugs.  Patient's living condition: lives alone    Post Discharge Medication Reconciliation Status:   Post Medication Reconciliation Status:         Current Outpatient Medications   Medication Sig     acetaminophen (TYLENOL) 325 MG tablet Take 2 tablets (650 mg) by mouth 4 times daily. May also take 1-2 tablets (325-650 mg) 2 times daily as needed for fever or pain.     albuterol (PROAIR HFA/PROVENTIL HFA/VENTOLIN HFA) 108 (90 Base) MCG/ACT inhaler Inhale 2 puffs into the lungs every 6 hours (Patient taking differently: Inhale 2 puffs into the lungs every 6 hours as needed)     aspirin (ASA) 81 MG chewable tablet Take 1 tablet (81 mg) by mouth daily     bisacodyl (DULCOLAX) 10 MG suppository Place 1 suppository (10 mg) rectally daily as needed for constipation     budesonide (PULMICORT) 0.5 MG/2ML neb solution Take 2 mLs (0.5 mg) by nebulization 2 times daily     escitalopram (LEXAPRO) 10 MG tablet Take 1 tablet (10 mg) by mouth daily     ferrous sulfate (IRON) 325 (65 FE) MG tablet Take 325 mg by mouth daily (with breakfast)      furosemide (LASIX) 20 MG tablet Take 20 mg by mouth daily     levothyroxine (SYNTHROID/LEVOTHROID) 88 MCG tablet Take 1 tablet (88 mcg) by mouth daily     loperamide (IMODIUM A-D) 2 MG tablet Take 2 mg by mouth every 4 hours as needed for diarrhea     metoprolol succinate ER (TOPROL XL) 25 MG 24 hr tablet Take 1 tablet (25 mg) by mouth daily     Multiple Vitamins-Minerals (MULTIVITAMIN ADULT) CHEW Take 2 chew tab by mouth daily     polyethylene glycol (MIRALAX) 17 GM/Dose powder Take 17 g by mouth 2 times daily While on narcotics to prevent constipation     predniSONE (DELTASONE) 10 MG tablet Take 10 mg by mouth daily For 5 days 8/28-9/1/22     senna-docusate (SENOKOT-S/PERICOLACE) 8.6-50 MG tablet Take 1 tablet by mouth 2 times daily as needed for constipation     traMADol (ULTRAM) 50 MG tablet Take 0.5-1 tablets (25-50 mg) by mouth every 6 hours as needed for moderate to  "severe pain (Take 0.5 tab (25 mg) for pain 6-8. Take 1 tab (50 mg) for pain 9-10.)     traZODone (DESYREL) 50 MG tablet Take 1 tablet (50 mg) by mouth At Bedtime     ACE/ARB/ARNI NOT PRESCRIBED (INTENTIONAL) Please choose reason not prescribed from choices below. (Patient not taking: Reported on 8/31/2022)     Immune Globulin, Human, (OCTAGAM) 5 GM/100ML SOLN into the vein every 6 weeks.    Hold this medication while in TCU-resume at discharge from TCU (Patient not taking: Reported on 8/31/2022)     Revefenacin 175 MCG/3ML SOLN Inhale 3 mLs (175 mcg) into the lungs daily     No current facility-administered medications for this visit.       ROS:  10 point ROS of systems including Constitutional, Eyes, Respiratory, Cardiovascular, Gastroenterology, Genitourinary, Integumentary, Musculoskeletal, Psychiatric were all negative except for pertinent positives noted in my HPI.    Vitals:  /60   Pulse 68   Temp 98.6  F (37  C)   Resp 18   Ht 1.499 m (4' 11\")   Wt 38.1 kg (84 lb)   LMP  (LMP Unknown)   SpO2 91%   BMI 16.97 kg/m    Exam:  Physical Exam  Vitals and nursing note reviewed.   Constitutional:       Comments: Frail appearing   Eyes:      General: No scleral icterus.  Cardiovascular:      Rate and Rhythm: Normal rate and regular rhythm.      Heart sounds: No murmur heard.  Pulmonary:      Effort: Pulmonary effort is normal. No respiratory distress.   Musculoskeletal:      Comments: 1+ edema in RLE. Trace edema in LLE   Skin:     Comments: Chronic venous stasis changes to bilateral lower extremities   Neurological:      General: No focal deficit present.      Mental Status: She is alert.      Cranial Nerves: No cranial nerve deficit.   Psychiatric:         Mood and Affect: Mood normal.         Behavior: Behavior normal.           Lab/Diagnostic data:  Recent labs in [facility] reviewed by me today.  and   Most Recent 3 CBC's:  Recent Labs   Lab Test 08/29/22  0617 08/28/22  1250 08/28/22  0656 08/27/22  0600 " 08/26/22  1211 08/25/22  1223   WBC 77.9*  --   --   --  86.3* 92.6*   HGB 7.7* 8.0* 7.0*   < > 8.6* 8.8*     --   --   --  101* 99   PLT 58*  --   --   --  71* 74*    < > = values in this interval not displayed.     Most Recent 3 BMP's:  Recent Labs   Lab Test 08/29/22  0617 08/28/22  0656 08/27/22  0640 08/26/22  1226 08/25/22  1223   *  --   --  128* 131*   POTASSIUM 4.3  --   --  5.0 4.7   CHLORIDE 99  --   --  93* 93*   CO2 32*  --   --  33* 34*   BUN 12.2  --   --  12.3 12.0   CR 0.60  --   --  0.69 0.70   ANIONGAP 4*  --   --  2* 4*   CARLOS 8.6  --   --  8.5 8.4   * 91 137* 135* 99       ASSESSMENT/PLAN:  Mechanical fall  Right femoral neck fracture status post surgical repair 8/27:  -Continue pain control with scheduled Tylenol and as needed tramadol.  Did not utilize Robaxin at all during hospital stay so discontinued to limit polypharmacy  - PT/OT  - Repeat imaging ordered 9/12.  With plan to follow-up with [facility] orthopedics  - Started on ASA 81 mg on discharge suspect due to DVT ppx. Will need to monitor for s/s bleeding in the setting of baseline thrombocytopenia    Chronic hypoxic respiratory failure  COPD with recent hospitalization for exacerbation:   -Continue PTA budesonide and albuterol inhaler as needed  - Uses revenfenacin at home.  During most recent TCU stay unable to obtain from the pharmacy.  Okay for patient to use home inhaler versus substitute for Spiriva during hospital stay if unable to obtain from pharmacy  - On final stages of prednisone taper 10 mg/d with end date 9/1  -  Continues on home O2    History of Takotsubo cardiomyopathy with now normalized EF  Essential HTN:   -Continue metoprolol XL 25 mg daily  - Currently on Lasix 20 mg daily.  This is the dose she was receiving in the hospital.  During most recent TCU stay she did not take it Tue/Fri. Continue daily for now  - Monitor volume status and electrolytes. Likely ok for weekly weight give now recovered  EF    CLL  Chronic anemia with acute blood loss  Thrombocytopenia: Follows with Dr. Ivory [facility] [place] [facility].  Receives IVIG every 6 weeks.  Baseline WBC .  Baseline hemoglobin 8/9, though fell to 7.0 postoperatively.  Baseline platelets  and noted to be 58 at time of discharge  - Continue ferrous sulfate 325 mg daily  - CBC 9/6    Mild hyponatremia: Sodium 135 at time of discharge  - BMP 9/6    Anxiety  Insomnia  Cognitive impairment: Recent SLUMS 16/30  -Continue Lexapro 10 mg daily and trazodone 50 mg daily  - OT consult    Hypothyroidism  - Continue Synthroid    Protein Malnutrition  -Facility nutritionist to follow  - Weekly weights    Chronic kidney disease stage IIIa: Baseline creatinine 0.6-0.8. Noted to be 0.6 at discharge  - BMP 9/6      Orders:  Discontinue Robaxin  BMP 9/6   CBC 9/6    Total time spent with patient visit at the skilled nursing facility was 40 including patient visit and review of past records. Greater than 50% of total time spent with counseling and coordinating care  Electronically signed by:  Alla Moy PA-C

## 2022-08-31 NOTE — PROGRESS NOTES
[facility]    Background: Transitional Care Management program auto-identified and prompting a chart review by [facility] team.    Assessment: Upon chart review, [facility] Team member will cancel/close this episode of Transitional Care Management program due to reason below:    Patient is discharged to a TCU and is enrolled in Care Coordination. Care team will follow patient for continued follow up.     Plan: Transitional Care Management episode closed per reason above.      Nandini Pereira MA  [facility] [place]    *[facility] Care Resource Team does NOT follow patient ongoing. Referrals are identified based on internal discharge reports and the outreach is to ensure patient has an understanding of their discharge instructions.

## 2022-08-31 NOTE — LETTER
8/31/2022        RE: Marie Kline  [address]  [address]  [facility] [address]        [facility] GERIATRICS    PRIMARY CARE PROVIDER AND CLINIC:  Be Dumont MD, [address] / [facility] [address]  Chief Complaint   Patient presents with     Hospital F/U      [place] Medical Record Number:  [id number]  Place of Service where encounter took place:  [facility] ([facility]) [[id number]]    Marie Kline  is a 89 year old  (4/28/1932), admitted to the above facility from  [facility]. Hospital stay 8/25/22 through 8/30/22..   HPI:    Marie Kline is a 89-year-old female with a past medical history of oxygen dependent COPD, CLL, thrombocytopenia, and history of Takotsubo cardiomyopathy who was admitted to [facility] following a hospitalization for right femoral neck fracture.  She had recently been hospitalized at [facility] 8/6-8/2011 for acute on chronic respiratory failure due to COPD exacerbation.  Hospital course was complicated by weakness so she was discharged to TCU and subsequently back to her independent living on 8/22.  Unfortunately shortly after discharge to independent living she suffered a mechanical fall and was brought back to the emergency department.  She was found to have a right femoral neck fracture and underwent surgical fixation by Dr. Ferguson 8/26.  Postoperative course was uncomplicated and she is now discharged to [facility].    Today patient is seen in her hospital room.  Additional history is discussed with social work and nursing staff.  She is upset that she is not in a private room.  There was briefly a plan for her to transition to a new care facility later this week but she has now elected to stay at [facility].  She is up sitting in her chair.  Does complain of some pain in her right hip but notes tramadol is working.  She is on her baseline oxygen of 2 L.  She denies chest pain or shortness of breath.  Appetite is good.  Denies  dizziness or lightheadedness.  Review of hospital MAR indicates she did not use any as needed Robaxin.  She denies constipation.  No abdominal pain.    CODE STATUS/ADVANCE DIRECTIVES DISCUSSION:  Prior  DNR / DNI  ALLERGIES:   Allergies   Allergen Reactions     Diagnostic X-Ray Materials Hives     CT DYE     Contrast Dye Hives     CT DYE     Prolia [Denosumab]      Osteonecrosis jaw       Rocephin [Ceftriaxone] Itching     Wound Dressing Adhesive       PAST MEDICAL HISTORY:   Past Medical History:   Diagnosis Date     Arthritis     Generalized     Bladder cancer (H)      CLL (chronic lymphocytic leukemia) (H)      Cognitive impairment     16/30 slums     Congestive heart failure (H) 10/24/2014    flash pulm edema ass w/Takotsubo     COPD (chronic obstructive pulmonary disease) (H)     chronic O2 2L/NC     Dysphagia     on DD3     Hypertension      Hypothyroid      Mumps      Pulmonary edema cardiac cause (H) 10/08/2014    associated w/Takotsubo ACS     Stress-induced cardiomyopathy 10/2018    cath with minimal dz,  EF 30-35 %improved to 60-65 % by echo 3/2021     Tricuspid valve disorders, 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    TR 2-3+ per echo      PAST SURGICAL HISTORY:   has a past surgical history that includes Coronary Angiography Adult Order (10/2014); Cystoscopy, biopsy bladder, combined (N/A, 2/9/2016); Cystoscopy, transurethral resection (TUR) tumor bladder, combined (N/A, 2/9/2016); Esophagoscopy, gastroscopy, duodenoscopy (EGD), combined (N/A, 6/22/2016); Cystoscopy; Bladder surgery; Biopsy Lymph Node Inguinal (Right, 10/23/2018); Open reduction internal fixation hip bipolar (Left, 11/19/2018); Left Heart Catheterization (N/A, 12/11/2018); and Open reduction internal fixation hip unipolar (Right, 8/26/2022).  FAMILY HISTORY: family history includes Cancer in her father; Cerebrovascular Disease in her mother.  SOCIAL HISTORY:   reports that she quit smoking about 26 years ago. Her smoking use included  cigarettes. She has never used smokeless tobacco. She reports that she does not drink alcohol and does not use drugs.  Patient's living condition: lives alone    Post Discharge Medication Reconciliation Status:   Post Medication Reconciliation Status:         Current Outpatient Medications   Medication Sig     acetaminophen (TYLENOL) 325 MG tablet Take 2 tablets (650 mg) by mouth 4 times daily. May also take 1-2 tablets (325-650 mg) 2 times daily as needed for fever or pain.     albuterol (PROAIR HFA/PROVENTIL HFA/VENTOLIN HFA) 108 (90 Base) MCG/ACT inhaler Inhale 2 puffs into the lungs every 6 hours (Patient taking differently: Inhale 2 puffs into the lungs every 6 hours as needed)     aspirin (ASA) 81 MG chewable tablet Take 1 tablet (81 mg) by mouth daily     bisacodyl (DULCOLAX) 10 MG suppository Place 1 suppository (10 mg) rectally daily as needed for constipation     budesonide (PULMICORT) 0.5 MG/2ML neb solution Take 2 mLs (0.5 mg) by nebulization 2 times daily     escitalopram (LEXAPRO) 10 MG tablet Take 1 tablet (10 mg) by mouth daily     ferrous sulfate (IRON) 325 (65 FE) MG tablet Take 325 mg by mouth daily (with breakfast)      furosemide (LASIX) 20 MG tablet Take 20 mg by mouth daily     levothyroxine (SYNTHROID/LEVOTHROID) 88 MCG tablet Take 1 tablet (88 mcg) by mouth daily     loperamide (IMODIUM A-D) 2 MG tablet Take 2 mg by mouth every 4 hours as needed for diarrhea     metoprolol succinate ER (TOPROL XL) 25 MG 24 hr tablet Take 1 tablet (25 mg) by mouth daily     Multiple Vitamins-Minerals (MULTIVITAMIN ADULT) CHEW Take 2 chew tab by mouth daily     polyethylene glycol (MIRALAX) 17 GM/Dose powder Take 17 g by mouth 2 times daily While on narcotics to prevent constipation     predniSONE (DELTASONE) 10 MG tablet Take 10 mg by mouth daily For 5 days 8/28-9/1/22     senna-docusate (SENOKOT-S/PERICOLACE) 8.6-50 MG tablet Take 1 tablet by mouth 2 times daily as needed for constipation     traMADol  "(ULTRAM) 50 MG tablet Take 0.5-1 tablets (25-50 mg) by mouth every 6 hours as needed for moderate to severe pain (Take 0.5 tab (25 mg) for pain 6-8. Take 1 tab (50 mg) for pain 9-10.)     traZODone (DESYREL) 50 MG tablet Take 1 tablet (50 mg) by mouth At Bedtime     ACE/ARB/ARNI NOT PRESCRIBED (INTENTIONAL) Please choose reason not prescribed from choices below. (Patient not taking: Reported on 8/31/2022)     Immune Globulin, Human, (OCTAGAM) 5 GM/100ML SOLN into the vein every 6 weeks.    Hold this medication while in TCU-resume at discharge from TCU (Patient not taking: Reported on 8/31/2022)     Revefenacin 175 MCG/3ML SOLN Inhale 3 mLs (175 mcg) into the lungs daily     No current facility-administered medications for this visit.       ROS:  10 point ROS of systems including Constitutional, Eyes, Respiratory, Cardiovascular, Gastroenterology, Genitourinary, Integumentary, Musculoskeletal, Psychiatric were all negative except for pertinent positives noted in my HPI.    Vitals:  /60   Pulse 68   Temp 98.6  F (37  C)   Resp 18   Ht 1.499 m (4' 11\")   Wt 38.1 kg (84 lb)   LMP  (LMP Unknown)   SpO2 91%   BMI 16.97 kg/m    Exam:  Physical Exam  Vitals and nursing note reviewed.   Constitutional:       Comments: Frail appearing   Eyes:      General: No scleral icterus.  Cardiovascular:      Rate and Rhythm: Normal rate and regular rhythm.      Heart sounds: No murmur heard.  Pulmonary:      Effort: Pulmonary effort is normal. No respiratory distress.   Musculoskeletal:      Comments: 1+ edema in RLE. Trace edema in LLE   Skin:     Comments: Chronic venous stasis changes to bilateral lower extremities   Neurological:      General: No focal deficit present.      Mental Status: She is alert.      Cranial Nerves: No cranial nerve deficit.   Psychiatric:         Mood and Affect: Mood normal.         Behavior: Behavior normal.           Lab/Diagnostic data:  Recent labs in [facility] reviewed by me today.  and   Most " Recent 3 CBC's:  Recent Labs   Lab Test 08/29/22  0617 08/28/22  1250 08/28/22  0656 08/27/22  0600 08/26/22  1211 08/25/22  1223   WBC 77.9*  --   --   --  86.3* 92.6*   HGB 7.7* 8.0* 7.0*   < > 8.6* 8.8*     --   --   --  101* 99   PLT 58*  --   --   --  71* 74*    < > = values in this interval not displayed.     Most Recent 3 BMP's:  Recent Labs   Lab Test 08/29/22  0617 08/28/22  0656 08/27/22  0640 08/26/22  1226 08/25/22  1223   *  --   --  128* 131*   POTASSIUM 4.3  --   --  5.0 4.7   CHLORIDE 99  --   --  93* 93*   CO2 32*  --   --  33* 34*   BUN 12.2  --   --  12.3 12.0   CR 0.60  --   --  0.69 0.70   ANIONGAP 4*  --   --  2* 4*   CARLOS 8.6  --   --  8.5 8.4   * 91 137* 135* 99       ASSESSMENT/PLAN:  Mechanical fall  Right femoral neck fracture status post surgical repair 8/27:  -Continue pain control with scheduled Tylenol and as needed tramadol.  Did not utilize Robaxin at all during hospital stay so discontinued to limit polypharmacy  - PT/OT  - Repeat imaging ordered 9/12.  With plan to follow-up with [facility] orthopedics  - Started on ASA 81 mg on discharge suspect due to DVT ppx. Will need to monitor for s/s bleeding in the setting of baseline thrombocytopenia    Chronic hypoxic respiratory failure  COPD with recent hospitalization for exacerbation:   -Continue PTA budesonide and albuterol inhaler as needed  - Uses revenfenacin at home.  During most recent TCU stay unable to obtain from the pharmacy.  Okay for patient to use home inhaler versus substitute for Spiriva during hospital stay if unable to obtain from pharmacy  - On final stages of prednisone taper 10 mg/d with end date 9/1  -  Continues on home O2    History of Takotsubo cardiomyopathy with now normalized EF  Essential HTN:   -Continue metoprolol XL 25 mg daily  - Currently on Lasix 20 mg daily.  This is the dose she was receiving in the hospital.  During most recent TCU stay she did not take it Tue/Fri. Continue  daily for now  - Monitor volume status and electrolytes. Likely ok for weekly weight give now recovered EF    CLL  Chronic anemia with acute blood loss  Thrombocytopenia: Follows with Dr. Ivory [facility] [place] oncology.  Receives IVIG every 6 weeks.  Baseline WBC .  Baseline hemoglobin 8/9, though fell to 7.0 postoperatively.  Baseline platelets  and noted to be 58 at time of discharge  - Continue ferrous sulfate 325 mg daily  - CBC 9/6    Mild hyponatremia: Sodium 135 at time of discharge  - BMP 9/6    Anxiety  Insomnia  Cognitive impairment: Recent SLUMS 16/30  -Continue Lexapro 10 mg daily and trazodone 50 mg daily  - OT consult    Hypothyroidism  - Continue Synthroid    Protein Malnutrition  -Facility nutritionist to follow  - Weekly weights    Chronic kidney disease stage IIIa: Baseline creatinine 0.6-0.8. Noted to be 0.6 at discharge  - BMP 9/6      Orders:  Discontinue Robaxin  BMP 9/6   CBC 9/6    Total time spent with patient visit at the skilled nursing facility was 40 including patient visit and review of past records. Greater than 50% of total time spent with counseling and coordinating care  Electronically signed by:  Alla Moy PA-C                       Sincerely,        Alla Moy PA-C

## 2022-09-02 NOTE — LETTER
9/2/2022        RE: Marie Kline  [address]  [facility] [address]        [place] GERIATRIC SERVICES  PHYSICIAN NOTE    PRIMARY CARE PROVIDER AND CLINIC:  Be Dumont MD, [address] / [facility] [address]    Chief Complaint   Patient presents with     Hospital F/U     [place] Medical Record Number:  [id number]  Place of Service where encounter took place:  [facility] ([facility]) [[id number]]    Marie Kline is a 89 year old (4/28/1932), admitted to the above facility from  [facility]. Hospital stay 8/25/22 through 8/30/22. Admitted to this facility for  rehab, medical management and nursing care.     HPI:    HPI information obtained from: facility chart records, facility staff, patient report and [facility] chart review.     Brief summary of hospital course: Marie Kline is a 89yoF admitted after a fall in which she unfortunately sustained a R hip fracture. S/p surgical repair without reported complications. Hgb 7.7 by discharge. TCU stay recommended. Of note, she'd been hospitalized earlier this month with a different TCU stay as well to follow d/t COPD exacerbation on chronic O2 therapy, frailty, cognitive impairment (SLUMS 16/30) and discharged to [facility].    Updates on status since skilled nursing admission: Marie is seen resting in bed awake today and welcomes the visit. Really has no complaints but is a vague historian. Denies current pain issues post R hip fracture/repair. No B/B concerns. Says her cough is chronic with some chronic mucous; not worse. Has just finished up prednisone taper from previous COPD exacerbation. Continues on nasal canula O2. Vitals and labs stable.     CODE STATUS/ADVANCE DIRECTIVES DISCUSSION:   DNR / DNI  Patient's living condition: lives in an assisted living facility    ALLERGIES: Diagnostic x-ray materials, Contrast dye, Prolia [denosumab], Rocephin [ceftriaxone], and Wound dressing adhesive    Past  Medical History:   Diagnosis Date     Arthritis     Generalized     Bladder cancer (H)      CLL (chronic lymphocytic leukemia) (H)      Cognitive impairment 08/2022 16/30 slums     Congestive heart failure (H) 10/24/2014    flash pulm edema associated w/Takotsubo     COPD (chronic obstructive pulmonary disease) (H)     chronic O2 2L/NC     Dysphagia     on DD3     Hypertension      Hypothyroid      Mumps      Pulmonary edema cardiac cause (H) 10/08/2014    associated w/Takotsubo ACS     Stress-induced cardiomyopathy 10/2018    cath with minimal dz,  EF 30-35 %improved to 60-65 % by echo 3/2021     Tricuspid valve disorders, 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    TR 2-3+ per echo      Past Surgical History:   Procedure Laterality Date     BIOPSY LYMPH NODE INGUINAL Right 10/23/2018    Procedure: excsional biopsy right inguinal lymph node;  Surgeon: Reji Connors MD;  Location: [facility] OR     BLADDER SURGERY       CORONARY ANGIOGRAPHY ADULT ORDER  10/2014    minimal CAD     CV LEFT HEART CATH N/A 12/11/2018    Procedure: Left Heart Cath;  Surgeon: Sadiq Carrasco MD;  Location:  HEART CARDIAC CATH LAB     CYSTOSCOPY       CYSTOSCOPY, BIOPSY BLADDER, COMBINED N/A 2/9/2016    Procedure: COMBINED CYSTOSCOPY, BIOPSY BLADDER;  Surgeon: Kar Nuñez MD;  Location:  OR     CYSTOSCOPY, TRANSURETHRAL RESECTION (TUR) TUMOR BLADDER, COMBINED N/A 2/9/2016    Procedure: COMBINED CYSTOSCOPY, TRANSURETHRAL RESECTION (TUR) TUMOR BLADDER;  Surgeon: Kar Nuñez MD;  Location:  OR     ESOPHAGOSCOPY, GASTROSCOPY, DUODENOSCOPY (EGD), COMBINED N/A 6/22/2016    Procedure: COMBINED ESOPHAGOSCOPY, GASTROSCOPY, DUODENOSCOPY (EGD);  Surgeon: Freddie Diez MD;  Location:  GI     OPEN REDUCTION INTERNAL FIXATION HIP BIPOLAR Left 11/19/2018    Procedure: Left bipolar hemiarthroplasty;  Surgeon: Scar Reynolds MD;  Location:  OR     OPEN REDUCTION INTERNAL FIXATION HIP UNIPOLAR Right 8/26/2022     Procedure: Cemented hemiarthroplasty, right hip.;  Surgeon: Adonis Ferguson MD;  Location: [facility] OR     Family History   Problem Relation Age of Onset     Cerebrovascular Disease Mother      Cancer Father      Social History     Tobacco Use     Smoking status: Former Smoker     Types: Cigarettes     Quit date: 2/3/1996     Years since quittin.5     Smokeless tobacco: Never Used   Vaping Use     Vaping Use: Never used   Substance Use Topics     Alcohol use: No     Alcohol/week: 0.0 standard drinks     Drug use: No        Current Outpatient Medications   Medication Sig Dispense Refill     acetaminophen (TYLENOL) 325 MG tablet Take 2 tablets (650 mg) by mouth 4 times daily. May also take 1-2 tablets (325-650 mg) 2 times daily as needed for fever or pain.       albuterol (PROAIR HFA/PROVENTIL HFA/VENTOLIN HFA) 108 (90 Base) MCG/ACT inhaler Inhale 2 puffs into the lungs every 6 hours (as needed) 1 each 11     aspirin (ASA) 81 MG chewable tablet Take 1 tablet (81 mg) by mouth daily  0     bisacodyl (DULCOLAX) 10 MG suppository Place 1 suppository (10 mg) rectally daily as needed for constipation       budesonide (PULMICORT) 0.5 MG/2ML neb solution Take 2 mLs (0.5 mg) by nebulization 2 times daily 120 mL 11     escitalopram (LEXAPRO) 10 MG tablet Take 1 tablet (10 mg) by mouth daily 30 tablet 3     ferrous sulfate (IRON) 325 (65 FE) MG tablet Take 325 mg by mouth daily (with breakfast)        furosemide (LASIX) 20 MG tablet Take 20 mg by mouth daily       Immune Globulin, Human, (OCTAGAM) 5 GM/100ML SOLN into the vein every 6 weeks.    Hold this medication while in TCU-resume at discharge from TCU (Patient not taking: Reported on 2022)       levothyroxine (SYNTHROID/LEVOTHROID) 88 MCG tablet Take 1 tablet (88 mcg) by mouth daily 90 tablet 0     loperamide (IMODIUM A-D) 2 MG tablet Take 2 mg by mouth every 4 hours as needed for diarrhea       metoprolol succinate ER (TOPROL XL) 25 MG 24 hr tablet Take 1 tablet  "(25 mg) by mouth daily 90 tablet 2     Multiple Vitamins-Minerals (MULTIVITAMIN ADULT) CHEW Take 2 chew tab by mouth daily       polyethylene glycol (MIRALAX) 17 GM/Dose powder Take 17 g by mouth 2 times daily While on narcotics to prevent constipation 510 g      Revefenacin 175 MCG/3ML SOLN Inhale 3 mLs (175 mcg) into the lungs daily 90 mL 11     senna-docusate (SENOKOT-S/PERICOLACE) 8.6-50 MG tablet Take 1 tablet by mouth 2 times daily as needed for constipation 30 tablet 0     traMADol (ULTRAM) 50 MG tablet Take 0.5-1 tablets (25-50 mg) by mouth every 6 hours as needed for moderate to severe pain (Take 0.5 tab (25 mg) for pain 6-8. Take 1 tab (50 mg) for pain 9-10.) 20 tablet 0     traZODone (DESYREL) 50 MG tablet Take 1 tablet (50 mg) by mouth At Bedtime 90 tablet 0       ROS:  Limited secondary to cognitive impairment but today pt reports as above in HPI    Exam:  /69   Pulse 87   Temp 97.9  F (36.6  C)   Resp 18   Ht 1.499 m (4' 11\")   Wt 38.1 kg (84 lb)   LMP  (LMP Unknown)   SpO2 96%   BMI 16.97 kg/m    Alert, lying comfortably in bed in NAD, frail appearing  No scleral icterus  Moist oral mucosa  Heart tones regular  Chronic cough, wearing O2, breathing non-labored, no wheezing anteriorly  Abdomen thin, soft  Clean Aquacel dressing noted to be intact R lateral hip  No edema  Vague historian, no tremor  Sweet disposition  Skin warm and dry, non-diaphoretic    Lab/Diagnostic data:  Labs today: Na normalized at 136, K 4.1, Bicarb chronically elevated at 32, Cr 0.56, WBC 73K, Hgb 7.7, Platelets 98 (improved from 58 prior)    TSH elevated at 12.93 in May 2022 with Free T4 within normal limits at that time 1.02    Vitamin D within normal limits at 44 in March 2021    ASSESSMENT/PLAN:  (W19.XXXD) Fall, subsequent encounter  (primary encounter diagnosis)  (S72.001D) Closed fracture of neck of right femur with routine healing, subsequent encounter  (M81.0) Osteoporosis, unspecified osteoporosis type, " unspecified pathological fracture presence  (R53.81) Physical deconditioning  (D64.9) Anemia, unspecified type  Continue TCU physical therapy and occupational therapy cares as she recovers from recent fall with R hip fracture/repair  Aquacel in place  To f/u with [facility] on site  Pain controlled per her report today  Hgb stable at 7.7 today; baseline 8s and follows with heme  ASA for DVT prophy per ortho recs - monitor in the context of her anemia/thrombocytopenia   Outpatient PCP management of osteoporosis; Vitamin D within normal limits last year    (C91.10) CLL (chronic lymphocytic leukemia) (H)  Followed by [facility] as an outpatient with chronic leukocytosis  Hgb and Platelet counts improved today  [facility]IG [facility] hold at [facility]    (J44.1) COPD exacerbation (H)  Just finished prednisone taper, off of antibiotics  Breathing reported by Marie at her baseline with some degree of chronic cough/mucous r/t COPD on chronic O2  Breathing comfortably on exam today  Continues on several neb/inhaled therapies    (R41.89) Cognitive impairment  SLUMS 16/30 at previous TCU stay this past month  Lives at [facility]  Occupational therapy eval and treat for safe dispo  Monitor for delirium  Calm, content demeanor during my visit today    (E87.1) Hyponatremia  Normalized today; is on chronic selective serotonin reuptake inhibitor Lexapro and Lasix  Monitor volume status - today seems euvolemic  Monitor nutritional level as seems quite thin/frail    (E03.9) Hypothyroidism, unspecified type  Will need outpatient PCP follow up on hypothyroidism with Levothyroxine therapy when she is well given h/o abnormal labs earlier this year        Electronically signed by:  oJcelin Archibald DO        Sincerely,        Jocelin Archibald DO

## 2022-09-02 NOTE — LETTER
9/2/2022        RE: Marie Kline  [address]  [facility] [address]        No notes on file      Sincerely,        Jocelin Archibald, DO

## 2022-09-02 NOTE — PROGRESS NOTES
[place] [facility]  PHYSICIAN NOTE    PRIMARY CARE PROVIDER AND CLINIC:  Be Dumont MD, [address] / [facility] [address]    Chief Complaint   Patient presents with     Hospital F/U     [place] Medical Record Number:  [id number]  Place of Service where encounter took place:  [facility] ([facility]) [[id number]]    Marie Kline is a 89 year old (4/28/1932), admitted to the above facility from  [facility]. Hospital stay 8/25/22 through 8/30/22. Admitted to this facility for  rehab, medical management and nursing care.     HPI:    HPI information obtained from: facility chart records, facility staff, patient report and [facility] chart review.     Brief summary of hospital course: Marie Kline is a 89yoF admitted after a fall in which she unfortunately sustained a R hip fracture. S/p surgical repair without reported complications. Hgb 7.7 by discharge. TCU stay recommended. Of note, she'd been hospitalized earlier this month with a different TCU stay as well to follow d/t COPD exacerbation on chronic O2 therapy, frailty, cognitive impairment ([facility] 16/30) and discharged to [facility].    Updates on status since skilled nursing admission: Marie is seen resting in bed awake today and welcomes the visit. Really has no complaints but is a vague historian. Denies current pain issues post R hip fracture/repair. No B/B concerns. Says her cough is chronic with some chronic mucous; not worse. Has just finished up prednisone taper from previous COPD exacerbation. Continues on nasal canula O2. Vitals and labs stable.     CODE STATUS/ADVANCE DIRECTIVES DISCUSSION:   DNR / DNI  Patient's living condition: lives in an assisted living facility    ALLERGIES: Diagnostic x-ray materials, Contrast dye, Prolia [denosumab], Rocephin [ceftriaxone], and Wound dressing adhesive    Past Medical History:   Diagnosis Date     Arthritis     Generalized     Bladder cancer (H)      CLL (chronic  lymphocytic leukemia) (H)      Cognitive impairment 08/2022 16/30 slums     Congestive heart failure (H) 10/24/2014    flash pulm edema associated w/Takotsubo     COPD (chronic obstructive pulmonary disease) (H)     chronic O2 2L/NC     Dysphagia     on DD3     Hypertension      Hypothyroid      Mumps      Pulmonary edema cardiac cause (H) 10/08/2014    associated w/Takotsubo ACS     Stress-induced cardiomyopathy 10/2018    cath with minimal dz,  EF 30-35 %improved to 60-65 % by echo 3/2021     Tricuspid valve disorders, 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    TR 2-3+ per echo      Past Surgical History:   Procedure Laterality Date     BIOPSY LYMPH NODE INGUINAL Right 10/23/2018    Procedure: excsional biopsy right inguinal lymph node;  Surgeon: Reji Connors MD;  Location: [facility] OR     BLADDER SURGERY       CORONARY ANGIOGRAPHY ADULT ORDER  10/2014    minimal CAD     CV LEFT HEART CATH N/A 12/11/2018    Procedure: Left Heart Cath;  Surgeon: Sadiq Carrasco MD;  Location:  HEART CARDIAC CATH LAB     CYSTOSCOPY       CYSTOSCOPY, BIOPSY BLADDER, COMBINED N/A 2/9/2016    Procedure: COMBINED CYSTOSCOPY, BIOPSY BLADDER;  Surgeon: Kar Nuñez MD;  Location:  OR     CYSTOSCOPY, TRANSURETHRAL RESECTION (TUR) TUMOR BLADDER, COMBINED N/A 2/9/2016    Procedure: COMBINED CYSTOSCOPY, TRANSURETHRAL RESECTION (TUR) TUMOR BLADDER;  Surgeon: Kar Nuñez MD;  Location:  OR     ESOPHAGOSCOPY, GASTROSCOPY, DUODENOSCOPY (EGD), COMBINED N/A 6/22/2016    Procedure: COMBINED ESOPHAGOSCOPY, GASTROSCOPY, DUODENOSCOPY (EGD);  Surgeon: Freddie Diez MD;  Location:  GI     OPEN REDUCTION INTERNAL FIXATION HIP BIPOLAR Left 11/19/2018    Procedure: Left bipolar hemiarthroplasty;  Surgeon: Scar Reynolds MD;  Location:  OR     OPEN REDUCTION INTERNAL FIXATION HIP UNIPOLAR Right 8/26/2022    Procedure: Cemented hemiarthroplasty, right hip.;  Surgeon: Adonis Ferguson MD;  Location:  OR      Family History   Problem Relation Age of Onset     Cerebrovascular Disease Mother      Cancer Father      Social History     Tobacco Use     Smoking status: Former Smoker     Types: Cigarettes     Quit date: 2/3/1996     Years since quittin.5     Smokeless tobacco: Never Used   Vaping Use     Vaping Use: Never used   Substance Use Topics     Alcohol use: No     Alcohol/week: 0.0 standard drinks     Drug use: No        Current Outpatient Medications   Medication Sig Dispense Refill     acetaminophen (TYLENOL) 325 MG tablet Take 2 tablets (650 mg) by mouth 4 times daily. May also take 1-2 tablets (325-650 mg) 2 times daily as needed for fever or pain.       albuterol (PROAIR HFA/PROVENTIL HFA/VENTOLIN HFA) 108 (90 Base) MCG/ACT inhaler Inhale 2 puffs into the lungs every 6 hours (as needed) 1 each 11     aspirin (ASA) 81 MG chewable tablet Take 1 tablet (81 mg) by mouth daily  0     bisacodyl (DULCOLAX) 10 MG suppository Place 1 suppository (10 mg) rectally daily as needed for constipation       budesonide (PULMICORT) 0.5 MG/2ML neb solution Take 2 mLs (0.5 mg) by nebulization 2 times daily 120 mL 11     escitalopram (LEXAPRO) 10 MG tablet Take 1 tablet (10 mg) by mouth daily 30 tablet 3     ferrous sulfate (IRON) 325 (65 FE) MG tablet Take 325 mg by mouth daily (with breakfast)        furosemide (LASIX) 20 MG tablet Take 20 mg by mouth daily       Immune Globulin, Human, (OCTAGAM) 5 GM/100ML SOLN into the vein every 6 weeks.    Hold this medication while in TCU-resume at discharge from TCU (Patient not taking: Reported on 2022)       levothyroxine (SYNTHROID/LEVOTHROID) 88 MCG tablet Take 1 tablet (88 mcg) by mouth daily 90 tablet 0     loperamide (IMODIUM A-D) 2 MG tablet Take 2 mg by mouth every 4 hours as needed for diarrhea       metoprolol succinate ER (TOPROL XL) 25 MG 24 hr tablet Take 1 tablet (25 mg) by mouth daily 90 tablet 2     Multiple Vitamins-Minerals (MULTIVITAMIN ADULT) CHEW Take 2 chew  "tab by mouth daily       polyethylene glycol (MIRALAX) 17 GM/Dose powder Take 17 g by mouth 2 times daily While on narcotics to prevent constipation 510 g      Revefenacin 175 MCG/3ML SOLN Inhale 3 mLs (175 mcg) into the lungs daily 90 mL 11     senna-docusate (SENOKOT-S/PERICOLACE) 8.6-50 MG tablet Take 1 tablet by mouth 2 times daily as needed for constipation 30 tablet 0     traMADol (ULTRAM) 50 MG tablet Take 0.5-1 tablets (25-50 mg) by mouth every 6 hours as needed for moderate to severe pain (Take 0.5 tab (25 mg) for pain 6-8. Take 1 tab (50 mg) for pain 9-10.) 20 tablet 0     traZODone (DESYREL) 50 MG tablet Take 1 tablet (50 mg) by mouth At Bedtime 90 tablet 0       ROS:  Limited secondary to cognitive impairment but today pt reports as above in HPI    Exam:  /69   Pulse 87   Temp 97.9  F (36.6  C)   Resp 18   Ht 1.499 m (4' 11\")   Wt 38.1 kg (84 lb)   LMP  (LMP Unknown)   SpO2 96%   BMI 16.97 kg/m    Alert, lying comfortably in bed in NAD, frail appearing  No scleral icterus  Moist oral mucosa  Heart tones regular  Chronic cough, wearing O2, breathing non-labored, no wheezing anteriorly  Abdomen thin, soft  Clean Aquacel dressing noted to be intact R lateral hip  No edema  Vague historian, no tremor  Sweet disposition  Skin warm and dry, non-diaphoretic    Lab/Diagnostic data:  Labs today: Na normalized at 136, K 4.1, Bicarb chronically elevated at 32, Cr 0.56, WBC 73K, Hgb 7.7, Platelets 98 (improved from 58 prior)    TSH elevated at 12.93 in May 2022 with Free T4 within normal limits at that time 1.02    Vitamin D within normal limits at 44 in March 2021    ASSESSMENT/PLAN:  (W19.XXXD) Fall, subsequent encounter  (primary encounter diagnosis)  (S72.001D) Closed fracture of neck of right femur with routine healing, subsequent encounter  (M81.0) Osteoporosis, unspecified osteoporosis type, unspecified pathological fracture presence  (R53.81) Physical deconditioning  (D64.9) Anemia, unspecified " type  Continue TCU physical therapy and occupational therapy cares as she recovers from recent fall with R hip fracture/repair  Aquacel in place  To f/u with [facility]CO on site  Pain controlled per her report today  Hgb stable at 7.7 today; baseline 8s and follows with josué  ASA for DVT prophy per ortho recs - monitor in the context of her anemia/thrombocytopenia   Outpatient PCP management of osteoporosis; Vitamin D within normal limits last year    (C91.10) CLL (chronic lymphocytic leukemia) (H)  Followed by j[facility] as an outpatient with chronic leukocytosis  Hgb and Platelet counts improved today  IVIG on hold at TCU    (J44.1) COPD exacerbation (H)  Just finished prednisone taper, off of antibiotics  Breathing reported by Marie at her baseline with some degree of chronic cough/mucous r/t COPD on chronic O2  Breathing comfortably on exam today  Continues on several neb/inhaled therapies    (R41.89) Cognitive impairment  SLUMS 16/30 at previous TCU stay this past month  Lives at [facility]  Occupational therapy eval and treat for safe dispo  Monitor for delirium  Calm, content demeanor during my visit today    (E87.1) Hyponatremia  Normalized today; is on chronic selective serotonin reuptake inhibitor Lexapro and Lasix  Monitor volume status - today seems euvolemic  Monitor nutritional level as seems quite thin/frail    (E03.9) Hypothyroidism, unspecified type  Will need outpatient PCP follow up on hypothyroidism with Levothyroxine therapy when she is well given h/o abnormal labs earlier this year        Electronically signed by:  Jocelin Archibald DO

## 2022-09-07 NOTE — LETTER
9/7/2022        RE: Marie Kline  [address]  [facility] [address]        [facility] GERIATRIC [facility]    Chief Complaint   Patient presents with     RECHECK       HPI:  Marie Kline is a 89 year old  (4/28/1932), who is being seen today for an episodic care visit at: [facility] ([facility]) [[id number]].     Brief summary: Marie Kline is a 89-year-old female with a past medical history of oxygen dependent COPD, CLL, thrombocytopenia, and history of Takotsubo cardiomyopathy who was admitted to U following a hospitalization for right femoral neck fracture.  She had recently been hospitalized at [facility] 8/6-8/2011 for acute on chronic respiratory failure due to COPD exacerbation.  Hospital course was complicated by weakness so she was discharged to U and subsequently back to her independent living on 8/22.  Unfortunately shortly after discharge to independent living she suffered a mechanical fall and was brought back to the emergency department.  She was found to have a right femoral neck fracture and underwent surgical fixation by Dr. Ferguson 8/26.  Postoperative course was uncomplicated and she is now discharged to U.    Today's concern is: Today Marie is seen for follow-up visit.  She expresses she is very eager to return home.  In discussion with therapy and social work potential discharge late next week.  Postoperative pain is tolerable.  She is using tramadol about 1-2 times per day.  She completed her course of prednisone and her breathing feels at baseline.  She has some edema in her right lower extremity related to her hip fracture.  Declines use of compression stockings.  Denies constipation.  No abdominal pain.    Review of nursing home EMR: -148    Allergies, and PMH/PSH reviewed in [facility] today.    REVIEW OF SYSTEMS:  4 point ROS including Respiratory, CV, GI and , other than that noted in the HPI,  is negative    Objective:   /54   Pulse 79   Temp 98  F  "(36.7  C)   Resp 16   Ht 1.499 m (4' 11\")   Wt 38.1 kg (84 lb)   LMP  (LMP Unknown)   SpO2 97%   BMI 16.97 kg/m      Physical Exam  Constitutional:       General: She is not in acute distress.     Appearance: Normal appearance. She is not ill-appearing.   HENT:      Head: Normocephalic and atraumatic.   Eyes:      General: No scleral icterus.  Cardiovascular:      Rate and Rhythm: Normal rate and regular rhythm.      Heart sounds: No murmur heard.  Pulmonary:      Effort: Pulmonary effort is normal.      Breath sounds: No wheezing.   Musculoskeletal:      Comments: Mild swelling to RLE.    Skin:     General: Skin is warm and dry.      Findings: No rash.   Neurological:      Mental Status: She is alert.          Recent labs in [facility] reviewed by me today.  and   Most Recent 3 CBC's:Recent Labs   Lab Test 09/02/22  0706 08/29/22  0617 08/28/22  1250 08/27/22  0600 08/26/22  1211   WBC 73.6* 77.9*  --   --  86.3*   HGB 7.7* 7.7* 8.0*   < > 8.6*    100  --   --  101*   PLT 98* 58*  --   --  71*    < > = values in this interval not displayed.     Most Recent 3 BMP's:Recent Labs   Lab Test 09/02/22  0706 08/29/22  0617 08/28/22  0656 08/27/22  0640 08/26/22  1226    135*  --   --  128*   POTASSIUM 4.1 4.3  --   --  5.0   CHLORIDE 98 99  --   --  93*   CO2 32* 32*  --   --  33*   BUN 10.6 12.2  --   --  12.3   CR 0.56 0.60  --   --  0.69   ANIONGAP 6* 4*  --   --  2*   CARLOS 8.9 8.6  --   --  8.5   GLC 93 105* 91   < > 135*    < > = values in this interval not displayed.       Assessment/Plan:  Mechanical fall  Right femoral neck fracture status post surgical repair 8/27:  -Continue pain control with scheduled Tylenol and as needed tramadol.   - PT/OT, walking 135 ft with FWW with therapy  - Repeat imaging ordered 9/12.  With plan to follow-up with [facility] orthopedics    Chronic hypoxic respiratory failure  COPD with recent hospitalization for exacerbation:   -Continue PTA budesonide and albuterol " inhaler as needed  - Uses revenfenacin at home, can hold at TCU  - Completed steroid taper 9/1  -  Continues on home O2    History of Takotsubo cardiomyopathy with now normalized EF  Essential HTN:   -Continue metoprolol XL 25 mg daily. BP in acceptable range  - Currently on Lasix 20 mg daily.  This is the dose she was receiving in the hospital.  During most recent TCU stay she did not take it Tue/Fri. Continue daily for now, patient thinks she was taking it daily at home  - Monitor volume status and electrolytes. Some edema in RLE likely due to recent fracture    CLL  Chronic anemia with acute blood loss  Thrombocytopenia: Follows with Dr. Ivory [facility] [place] oncology.  Receives IVIG every 6 weeks.  Baseline WBC .  Baseline hemoglobin 8/9, though fell to 7.0 postoperatively.  Baseline platelets  and noted to be 58 at time of discharge  - Continue ferrous sulfate 325 mg daily  - CBC 9/2 stable. Will repeat 9/12    Mild hyponatremia, resolved: Sodium 135 at time of discharge  - BMP 9/2 improved to 136    Anxiety  Insomnia  Cognitive impairment:   -Continue Lexapro 10 mg daily and trazodone 50 mg daily  - OT consult- [email or web address] 19 CPT 4.875    Hypothyroidism  - Continue Synthroid    Protein Malnutrition  -Facility nutritionist to follow  - Weekly weights    Chronic kidney disease stage IIIa: Baseline creatinine 0.6-0.8. Noted to be 0.6 at discharge  - BMP stable at 0.56 9/2    Orders:  CBC 9/12    Electronically signed by: Alla Moy PA-C           Sincerely,        Alla Moy PA-C

## 2022-09-07 NOTE — PROGRESS NOTES
"[facility] GERIATRIC SERVICES    Chief Complaint   Patient presents with     RECHECK       HPI:  Marie Kline is a 89 year old  (4/28/1932), who is being seen today for an episodic care visit at: [facility] ([facility]) [[id number]].     Brief summary: Marie Kline is a 89-year-old female with a past medical history of oxygen dependent COPD, CLL, thrombocytopenia, and history of Takotsubo cardiomyopathy who was admitted to U following a hospitalization for right femoral neck fracture.  She had recently been hospitalized at [facility] 8/6-8/2011 for acute on chronic respiratory failure due to COPD exacerbation.  Hospital course was complicated by weakness so she was discharged to U and subsequently back to her independent living on 8/22.  Unfortunately shortly after discharge to independent living she suffered a mechanical fall and was brought back to the emergency department.  She was found to have a right femoral neck fracture and underwent surgical fixation by Dr. Ferguson 8/26.  Postoperative course was uncomplicated and she is now discharged to U.    Today's concern is: Today Marie is seen for follow-up visit.  She expresses she is very eager to return home.  In discussion with therapy and social work potential discharge late next week.  Postoperative pain is tolerable.  She is using tramadol about 1-2 times per day.  She completed her course of prednisone and her breathing feels at baseline.  She has some edema in her right lower extremity related to her hip fracture.  Declines use of compression stockings.  Denies constipation.  No abdominal pain.    Review of nursing home EMR: -148    Allergies, and PMH/PSH reviewed in [facility] today.    REVIEW OF SYSTEMS:  4 point ROS including Respiratory, CV, GI and , other than that noted in the HPI,  is negative    Objective:   /54   Pulse 79   Temp 98  F (36.7  C)   Resp 16   Ht 1.499 m (4' 11\")   Wt 38.1 kg (84 lb)   LMP  (LMP Unknown)   SpO2 97%  "  BMI 16.97 kg/m      Physical Exam  Constitutional:       General: She is not in acute distress.     Appearance: Normal appearance. She is not ill-appearing.   HENT:      Head: Normocephalic and atraumatic.   Eyes:      General: No scleral icterus.  Cardiovascular:      Rate and Rhythm: Normal rate and regular rhythm.      Heart sounds: No murmur heard.  Pulmonary:      Effort: Pulmonary effort is normal.      Breath sounds: No wheezing.   Musculoskeletal:      Comments: Mild swelling to RLE.    Skin:     General: Skin is warm and dry.      Findings: No rash.   Neurological:      Mental Status: She is alert.          Recent labs in [facility] reviewed by me today.  and   Most Recent 3 CBC's:Recent Labs   Lab Test 09/02/22  0706 08/29/22  0617 08/28/22  1250 08/27/22  0600 08/26/22  1211   WBC 73.6* 77.9*  --   --  86.3*   HGB 7.7* 7.7* 8.0*   < > 8.6*    100  --   --  101*   PLT 98* 58*  --   --  71*    < > = values in this interval not displayed.     Most Recent 3 BMP's:Recent Labs   Lab Test 09/02/22  0706 08/29/22  0617 08/28/22  0656 08/27/22  0640 08/26/22  1226    135*  --   --  128*   POTASSIUM 4.1 4.3  --   --  5.0   CHLORIDE 98 99  --   --  93*   CO2 32* 32*  --   --  33*   BUN 10.6 12.2  --   --  12.3   CR 0.56 0.60  --   --  0.69   ANIONGAP 6* 4*  --   --  2*   CARLOS 8.9 8.6  --   --  8.5   GLC 93 105* 91   < > 135*    < > = values in this interval not displayed.       Assessment/Plan:  Mechanical fall  Right femoral neck fracture status post surgical repair 8/27:  -Continue pain control with scheduled Tylenol and as needed tramadol.   - PT/OT, walking 135 ft with FWW with therapy  - Repeat imaging ordered 9/12.  With plan to follow-up with [facility] orthopedics    Chronic hypoxic respiratory failure  COPD with recent hospitalization for exacerbation:   -Continue PTA budesonide and albuterol inhaler as needed  - Uses revenfenacin at home, can hold at TCU  - Completed steroid taper 9/1  -   Continues on home O2    History of Takotsubo cardiomyopathy with now normalized EF  Essential HTN:   -Continue metoprolol XL 25 mg daily. BP in acceptable range  - Currently on Lasix 20 mg daily.  This is the dose she was receiving in the hospital.  During most recent TCU stay she did not take it Tue/Fri. Continue daily for now, patient thinks she was taking it daily at home  - Monitor volume status and electrolytes. Some edema in RLE likely due to recent fracture    CLL  Chronic anemia with acute blood loss  Thrombocytopenia: Follows with Dr. Ivory of [place] oncology.  Receives IVIG every 6 weeks.  Baseline WBC .  Baseline hemoglobin 8/9, though fell to 7.0 postoperatively.  Baseline platelets  and noted to be 58 at time of discharge  - Continue ferrous sulfate 325 mg daily  - CBC 9/2 stable. Will repeat 9/12    Mild hyponatremia, resolved: Sodium 135 at time of discharge  - BMP 9/2 improved to 136    Anxiety  Insomnia  Cognitive impairment:   -Continue Lexapro 10 mg daily and trazodone 50 mg daily  - OT consult- [facility] 19 CPT 4.875    Hypothyroidism  - Continue Synthroid    Protein Malnutrition  -Facility nutritionist to follow  - Weekly weights    Chronic kidney disease stage IIIa: Baseline creatinine 0.6-0.8. Noted to be 0.6 at discharge  - BMP stable at 0.56 9/2    Orders:  CBC 9/12    Electronically signed by: Alla Moy PA-C

## 2022-09-13 NOTE — TELEPHONE ENCOUNTER
[facility] Triage Nurse Telephone Encounter    Provider: Alla Moy PA-C  Facility: [facility]  Facility Type:  TCU    Caller: Abdi  Call Back Number: [phone]    Allergies:    Allergies   Allergen Reactions     Diagnostic X-Ray Materials Hives     CT DYE     Contrast Dye Hives     CT DYE     Prolia [Denosumab]      Osteonecrosis jaw       Rocephin [Ceftriaxone] Itching     Wound Dressing Adhesive         Reason for call: Pt very restless this afternoon - jumping from 1 activity to the next immediately. Pt self administers Albuterol inhaler and uses it frequently.    Verbal Order/Direction given by Provider: Have staff administer Albuterol inhaler to make sure she is not taking too much, in turn affecting behavior.    Provider giving Order:  Alla Moy PA-C    Verbal Order given to: Abdi Bill

## 2022-09-13 NOTE — PROGRESS NOTES
Chief Complaint   Patient presents with     Nursing Home Acute       HPI:  Marie Kline is a 89 year old  (4/28/1932), who is being seen today for an episodic care visit at: [facility] ([facility]) [[id number]].     Brief summary: Marie Kline is a 89-year-old female with a past medical history of oxygen dependent COPD, CLL, thrombocytopenia, and history of Takotsubo cardiomyopathy who was admitted to [facility] following a hospitalization for right femoral neck fracture.  She had recently been hospitalized at [facility] 8/6-8/2011 for acute on chronic respiratory failure due to COPD exacerbation.  Hospital course was complicated by weakness so she was discharged to TCU and subsequently back to her independent living on 8/22.  Unfortunately shortly after discharge to independent living she suffered a mechanical fall and was brought back to the emergency department.  She was found to have a right femoral neck fracture and underwent surgical fixation by Dr. Ferguson 8/26.  Postoperative course was uncomplicated and she is now discharged to U.    Today's concern is: Today patient is seen for a follow up visit.  Patient's family members are at bedside    Febrile this morning up to 101.8.  Gave Tylenol with improvement.  COVID swab negative at the facility.  On exam patient is sitting at the edge of the bed.  She denies specific concerns or complaints.  Specifically denies any congestion.  Has a chronic cough that is unchanged.  No change in her breathing.  Denies any dysuria hematuria or abdominal pain.  She complains of a mild headache that she is had off and on for several months.  Discussed plans to obtain chest x-ray and labs to rule out other causes of fever.    Additionally spoke with Joceiln AGUILAR NP who had incidentally seen patient for postoperative follow-up.  She indicated the incision looked good with no concerns for infection.    Allergies, and PMH/PSH reviewed in [facility] today.    REVIEW OF SYSTEMS:  10 point ROS of  "systems including Constitutional, Eyes, Respiratory, Cardiovascular, Gastroenterology, Genitourinary, Integumentary, Musculoskeletal, Psychiatric were all negative except for pertinent positives noted in my HPI.    Objective:   /66   Pulse 83   Temp 98.5  F (36.9  C)   Resp 18   Ht 1.499 m (4' 11\")   Wt 38.1 kg (84 lb)   LMP  (LMP Unknown)   SpO2 95%   BMI 16.97 kg/m      Physical Exam  Constitutional:       Comments: Frail appearing   Eyes:      General: No scleral icterus.     Extraocular Movements: Extraocular movements intact.      Pupils: Pupils are equal, round, and reactive to light.   Cardiovascular:      Rate and Rhythm: Normal rate and regular rhythm.   Pulmonary:      Effort: Pulmonary effort is normal.      Breath sounds: No rales.   Musculoskeletal:      Left lower leg: No edema.      Comments: Mild RLE edema   Skin:     General: Skin is warm and dry.      Findings: No rash.   Neurological:      General: No focal deficit present.      Mental Status: She is alert.      Cranial Nerves: No cranial nerve deficit.      Motor: No weakness.   Psychiatric:         Mood and Affect: Mood normal.            Post Medication Reconciliation Status:          Recent labs in [facility] reviewed by me today.  and   Most Recent 3 CBC's:Recent Labs   Lab Test 09/13/22  0546 09/08/22  0722 09/02/22  0706 08/29/22  0617 08/27/22  0600 08/26/22  1211   WBC  --   --  73.6* 77.9*  --  86.3*   HGB 9.0* 8.1* 7.7* 7.7*   < > 8.6*   MCV  --   --  100 100  --  101*   PLT  --   --  98* 58*  --  71*    < > = values in this interval not displayed.     Most Recent 3 BMP's:Recent Labs   Lab Test 09/14/22  0617 09/02/22  0706 08/29/22  0617   * 136 135*   POTASSIUM 4.7 4.1 4.3   CHLORIDE 95* 98 99   CO2 33* 32* 32*   BUN 13.5 10.6 12.2   CR 0.75 0.56 0.60   ANIONGAP 7 6* 4*   CARLOS 9.4 8.9 8.6   * 93 105*       Assessment/Plan:  Fever: Febrile up to 101.8 this morning.  No signs or symptoms of localizing infection.  " COVID swab obtained at the facility negative.  Exam unrevealing.  Ortho PA who evaluated the patient today saw no no concerning signs indicative of postoperative infection  - Monitor vital signs every shift x48 hours  - CBC, UA, BMP and chest x-ray    Mechanical fall  Right femoral neck fracture status post surgical repair 8/27:  -Continue pain control with scheduled Tylenol and as needed tramadol.   - PT/OT, walking 135 ft with FWW with therapy  - Orthopedics saw today and no new concerns    Chronic hypoxic respiratory failure  COPD with recent hospitalization for exacerbation:   -Continue PTA budesonide and albuterol inhaler as needed  - Uses revenfenacin at home, substituting Spiriva while at TCU  - Completed steroid taper 9/1  -  Continues on home O2    History of Takotsubo cardiomyopathy with now normalized EF  Essential HTN:   -Continue metoprolol XL 25 mg daily. BP in acceptable range  - Currently on Lasix 20 mg daily.  This is the dose she was receiving in the hospital.  During most recent TCU stay she did not take it Tue/Fri. Continue daily for now, patient thinks she was taking it daily at home  - Monitor volume status and electrolytes. Some edema in RLE likely due to recent fracture. No weights. Will ask facility to obtain    CLL  Chronic anemia with acute blood loss  Thrombocytopenia: Follows with Dr. Ivory [facility] [place] [facility].  Receives IVIG every 6 weeks.  Baseline WBC .  Baseline hemoglobin 8/9, though fell to 7.0 postoperatively.  Baseline platelets  and noted to be 58 at time of discharge  - Continue ferrous sulfate 325 mg daily  - Repeat CBC tomorrow    Mild hyponatremia, resolved: Sodium 135 at time of discharge  - BMP 9/2 improved to 136    Anxiety  Insomnia  Cognitive impairment:   -Continue Lexapro 10 mg daily and trazodone 50 mg daily  - OT consult- [facility] 19 CPT 4.875    Hypothyroidism  - Continue Synthroid    Protein Malnutrition  -Facility nutritionist to follow  - Weekly  weights    Chronic kidney disease stage IIIa: Baseline creatinine 0.6-0.8. Noted to be 0.6 at discharge  - BMP stable at 0.56 9/2    Orders:  UA/UC  CBC with differential  BMP  Chest x-ray    Electronically signed by: Alla Moy PA-C

## 2022-09-13 NOTE — LETTER
9/13/2022        RE: Marie Kline  [address]  [place] [address]          Chief Complaint   Patient presents with     Nursing Home Acute       HPI:  Marie Kline is a 89 year old  (4/28/1932), who is being seen today for an episodic care visit at: [facility] ([facility]) [[id number]].     Brief summary: Marie Kline is a 89-year-old female with a past medical history of oxygen dependent COPD, CLL, thrombocytopenia, and history of Takotsubo cardiomyopathy who was admitted to [facility] following a hospitalization for right femoral neck fracture.  She had recently been hospitalized at [facility] 8/6-8/2011 for acute on chronic respiratory failure due to COPD exacerbation.  Hospital course was complicated by weakness so she was discharged to U and subsequently back to her independent living on 8/22.  Unfortunately shortly after discharge to independent living she suffered a mechanical fall and was brought back to the emergency department.  She was found to have a right femoral neck fracture and underwent surgical fixation by Dr. Ferguson 8/26.  Postoperative course was uncomplicated and she is now discharged to U.    Today's concern is: Today patient is seen for a follow up visit.  Patient's family members are at bedside    Febrile this morning up to 101.8.  Gave Tylenol with improvement.  COVID swab negative at the facility.  On exam patient is sitting at the edge of the bed.  She denies specific concerns or complaints.  Specifically denies any congestion.  Has a chronic cough that is unchanged.  No change in her breathing.  Denies any dysuria hematuria or abdominal pain.  She complains of a mild headache that she is had off and on for several months.  Discussed plans to obtain chest x-ray and labs to rule out other causes of fever.    Additionally spoke with Jocelin AGUILAR NP who had incidentally seen patient for postoperative follow-up.  She indicated the incision looked good with no concerns  "for infection.    Allergies, and PMH/PSH reviewed in [facility] today.    REVIEW OF SYSTEMS:  10 point ROS of systems including Constitutional, Eyes, Respiratory, Cardiovascular, Gastroenterology, Genitourinary, Integumentary, Musculoskeletal, Psychiatric were all negative except for pertinent positives noted in my HPI.    Objective:   /66   Pulse 83   Temp 98.5  F (36.9  C)   Resp 18   Ht 1.499 m (4' 11\")   Wt 38.1 kg (84 lb)   LMP  (LMP Unknown)   SpO2 95%   BMI 16.97 kg/m      Physical Exam  Constitutional:       Comments: Frail appearing   Eyes:      General: No scleral icterus.     Extraocular Movements: Extraocular movements intact.      Pupils: Pupils are equal, round, and reactive to light.   Cardiovascular:      Rate and Rhythm: Normal rate and regular rhythm.   Pulmonary:      Effort: Pulmonary effort is normal.      Breath sounds: No rales.   Musculoskeletal:      Left lower leg: No edema.      Comments: Mild RLE edema   Skin:     General: Skin is warm and dry.      Findings: No rash.   Neurological:      General: No focal deficit present.      Mental Status: She is alert.      Cranial Nerves: No cranial nerve deficit.      Motor: No weakness.   Psychiatric:         Mood and Affect: Mood normal.            Post Medication Reconciliation Status:          Recent labs in [facility] reviewed by me today.  and   Most Recent 3 CBC's:Recent Labs   Lab Test 09/13/22  0546 09/08/22  0722 09/02/22  0706 08/29/22  0617 08/27/22  0600 08/26/22  1211   WBC  --   --  73.6* 77.9*  --  86.3*   HGB 9.0* 8.1* 7.7* 7.7*   < > 8.6*   MCV  --   --  100 100  --  101*   PLT  --   --  98* 58*  --  71*    < > = values in this interval not displayed.     Most Recent 3 BMP's:Recent Labs   Lab Test 09/14/22  0617 09/02/22  0706 08/29/22  0617   * 136 135*   POTASSIUM 4.7 4.1 4.3   CHLORIDE 95* 98 99   CO2 33* 32* 32*   BUN 13.5 10.6 12.2   CR 0.75 0.56 0.60   ANIONGAP 7 6* 4*   CARLOS 9.4 8.9 8.6   * 93 105* "       Assessment/Plan:  Fever: Febrile up to 101.8 this morning.  No signs or symptoms of localizing infection.  COVID swab obtained at the facility negative.  Exam unrevealing.  Ortho PA who evaluated the patient today saw no no concerning signs indicative of postoperative infection  - Monitor vital signs every shift x48 hours  - CBC, UA, BMP and chest x-ray    Mechanical fall  Right femoral neck fracture status post surgical repair 8/27:  -Continue pain control with scheduled Tylenol and as needed tramadol.   - PT/OT, walking 135 ft with FWW with therapy  - Orthopedics saw today and no new concerns    Chronic hypoxic respiratory failure  COPD with recent hospitalization for exacerbation:   -Continue PTA budesonide and albuterol inhaler as needed  - Uses revenfenacin at home, substituting Spiriva while at TCU  - Completed steroid taper 9/1  -  Continues on home O2    History of Takotsubo cardiomyopathy with now normalized EF  Essential HTN:   -Continue metoprolol XL 25 mg daily. BP in acceptable range  - Currently on Lasix 20 mg daily.  This is the dose she was receiving in the hospital.  During most recent TCU stay she did not take it Tue/Fri. Continue daily for now, patient thinks she was taking it daily at home  - Monitor volume status and electrolytes. Some edema in RLE likely due to recent fracture. No weights. Will ask facility to obtain    CLL  Chronic anemia with acute blood loss  Thrombocytopenia: Follows with Dr. Ivory of [place] [facility].  Receives IVIG every 6 weeks.  Baseline WBC .  Baseline hemoglobin 8/9, though fell to 7.0 postoperatively.  Baseline platelets  and noted to be 58 at time of discharge  - Continue ferrous sulfate 325 mg daily  - Repeat CBC tomorrow    Mild hyponatremia, resolved: Sodium 135 at time of discharge  - BMP 9/2 improved to 136    Anxiety  Insomnia  Cognitive impairment:   -Continue Lexapro 10 mg daily and trazodone 50 mg daily  - OT consult- [facility] 19 CPT  4.875    Hypothyroidism  - Continue Synthroid    Protein Malnutrition  -Facility nutritionist to follow  - Weekly weights    Chronic kidney disease stage IIIa: Baseline creatinine 0.6-0.8. Noted to be 0.6 at discharge  - BMP stable at 0.56 9/2    Orders:  UA/UC  CBC with differential  BMP  Chest x-ray    Electronically signed by: Alla Moy PA-C           Sincerely,        Alla Moy PA-C

## 2022-09-16 NOTE — PROGRESS NOTES
"[facility] GERIATRICS    Chief Complaint   Patient presents with     Nursing Home Acute     HPI:  Marie Kline is a 89 year old  (4/28/1932), who is being seen today for an episodic care visit at: [facility] ([facility]) [[id number]].     Today's concern is: Patient is seen today to follow up workup for fever. Had fever of 101.8 2 days ago which has now resolved. CXR was obtained which showed suspected left sided infiltrate. On call started Amoxicillin. UA appears bland. Fever has resolved. On exam today Marie is doing well. Still focused on discharging home as soon as possible. Has not needed increase in her O2. Was notified earlier this week that she was using her albuterol every 15 mins and orders placed to remove from bedside. She denies SOB. Notes chronic cough of clear sputum. CBC today with chronic leukocytosis in the setting of known CLL.    Allergies, and PMH/PSH reviewed in [facility] today.  REVIEW OF SYSTEMS:  4 point ROS including Respiratory, CV, GI and , other than that noted in the HPI,  is negative    Objective:   /76   Pulse 90   Temp 98.6  F (37  C)   Resp 20   Ht 1.499 m (4' 11\")   Wt 38.1 kg (84 lb)   LMP  (LMP Unknown)   SpO2 95%   BMI 16.97 kg/m    Physical Exam  Constitutional:       Comments: Frail   Eyes:      General: No scleral icterus.  Cardiovascular:      Rate and Rhythm: Normal rate and regular rhythm.   Pulmonary:      Effort: Pulmonary effort is normal.      Breath sounds: No wheezing.   Musculoskeletal:      Comments: Trace LE edema   Neurological:      General: No focal deficit present.      Mental Status: She is alert.   Psychiatric:         Mood and Affect: Mood normal.         Behavior: Behavior normal.           Recent labs in [facility] reviewed by me today.  and   Most Recent 3 CBC's:  Recent Labs   Lab Test 09/16/22  0544 09/13/22  0546 09/08/22  0722 09/02/22  0706 08/29/22  0617   WBC 60.7*  --   --  73.6* 77.9*   HGB 8.3* 9.0* 8.1* 7.7* 7.7*   MCV 97  " --   --  100 100     --   --  98* 58*     Most Recent 3 BMP's:  Recent Labs   Lab Test 09/14/22  0617 09/02/22  0706 08/29/22  0617   * 136 135*   POTASSIUM 4.7 4.1 4.3   CHLORIDE 95* 98 99   CO2 33* 32* 32*   BUN 13.5 10.6 12.2   CR 0.75 0.56 0.60   ANIONGAP 7 6* 4*   CARLOS 9.4 8.9 8.6   * 93 105*     Most Recent Urinalysis:  Recent Labs   Lab Test 09/15/22  1100 04/15/21  1034   COLOR Light Yellow Yellow   APPEARANCE Clear Clear   URINEGLC Negative Negative   URINEBILI Negative Negative   URINEKETONE Negative Negative   SG 1.020 1.020   UBLD Negative Negative   URINEPH 5.5 6.5   PROTEIN 30 * Negative   UROBILINOGEN  --  0.2   NITRITE Negative Negative   LEUKEST Negative Negative   RBCU <1  --    WBCU 1  --       PPX CXR 9/15: Possible airspace infiltrate in left lower lob    Assessment/Plan:  Fever, resolved  Possible Left Sided pneumonia  Chronic Hypoxia  Oxygen dependent COPD: Fever 2 days ago up 101.8. No localizing symptoms. Now resolved. UA bland. CBC with chronic leukocytosis in the setting of CLL. CXR with possible left lower lobe infiltrate. On call started Augmentin. I do note previous CXR with noted linear scarring in LLL so unclear if truly new infiltrate but given true fever will elect to treat  - Changed Augmentin from 250 mg tid to 500 mg bid x 7 days  - Continue PTA inhalers, discussed with patient limiting albuterol  - Continue to follow VS per protocol  - Continue supplemental oxygen        Post Medication Reconciliation Status: Patient was not discharged from an inpatient facility or TCU        Orders:  Change Augmentin from 250 mg tid to 500 mg bid x 7 days    Electronically signed by: Alla Moy PA-C

## 2022-09-16 NOTE — LETTER
"    9/16/2022        RE: Marie Kline  [address]  [place] [address]        [facility]    Chief Complaint   Patient presents with     Nursing Home Acute     HPI:  Marie Klien is a 89 year old  (4/28/1932), who is being seen today for an episodic care visit at: [facility] ([facility]) [[id number]].     Today's concern is: Patient is seen today to follow up workup for fever. Had fever of 101.8 2 days ago which has now resolved. CXR was obtained which showed suspected left sided infiltrate. On call started Amoxicillin. UA appears bland. Fever has resolved. On exam today Marie is doing well. Still focused on discharging home as soon as possible. Has not needed increase in her O2. Was notified earlier this week that she was using her albuterol every 15 mins and orders placed to remove from bedside. She denies SOB. Notes chronic cough of clear sputum. CBC today with chronic leukocytosis in the setting of known CLL.    Allergies, and PMH/PSH reviewed in [facility] today.  REVIEW OF SYSTEMS:  4 point ROS including Respiratory, CV, GI and , other than that noted in the HPI,  is negative    Objective:   /76   Pulse 90   Temp 98.6  F (37  C)   Resp 20   Ht 1.499 m (4' 11\")   Wt 38.1 kg (84 lb)   LMP  (LMP Unknown)   SpO2 95%   BMI 16.97 kg/m    Physical Exam  Constitutional:       Comments: Frail   Eyes:      General: No scleral icterus.  Cardiovascular:      Rate and Rhythm: Normal rate and regular rhythm.   Pulmonary:      Effort: Pulmonary effort is normal.      Breath sounds: No wheezing.   Musculoskeletal:      Comments: Trace LE edema   Neurological:      General: No focal deficit present.      Mental Status: She is alert.   Psychiatric:         Mood and Affect: Mood normal.         Behavior: Behavior normal.           Recent labs in [facility] reviewed by me today.  and   Most Recent 3 CBC's:  Recent Labs   Lab Test 09/16/22  0544 09/13/22  0546 09/08/22  0722 " 09/02/22  0706 08/29/22  0617   WBC 60.7*  --   --  73.6* 77.9*   HGB 8.3* 9.0* 8.1* 7.7* 7.7*   MCV 97  --   --  100 100     --   --  98* 58*     Most Recent 3 BMP's:  Recent Labs   Lab Test 09/14/22  0617 09/02/22  0706 08/29/22  0617   * 136 135*   POTASSIUM 4.7 4.1 4.3   CHLORIDE 95* 98 99   CO2 33* 32* 32*   BUN 13.5 10.6 12.2   CR 0.75 0.56 0.60   ANIONGAP 7 6* 4*   CARLOS 9.4 8.9 8.6   * 93 105*     Most Recent Urinalysis:  Recent Labs   Lab Test 09/15/22  1100 04/15/21  1034   COLOR Light Yellow Yellow   APPEARANCE Clear Clear   URINEGLC Negative Negative   URINEBILI Negative Negative   URINEKETONE Negative Negative   SG 1.020 1.020   UBLD Negative Negative   URINEPH 5.5 6.5   PROTEIN 30 * Negative   UROBILINOGEN  --  0.2   NITRITE Negative Negative   LEUKEST Negative Negative   RBCU <1  --    WBCU 1  --       PPX CXR 9/15: Possible airspace infiltrate in left lower lob    Assessment/Plan:  Fever, resolved  Possible Left Sided pneumonia  Chronic Hypoxia  Oxygen dependent COPD: Fever 2 days ago up 101.8. No localizing symptoms. Now resolved. UA bland. CBC with chronic leukocytosis in the setting of CLL. CXR with possible left lower lobe infiltrate. On call started Augmentin. I do note previous CXR with noted linear scarring in LLL so unclear if truly new infiltrate but given true fever will elect to treat  - Changed Augmentin from 250 mg tid to 500 mg bid x 7 days  - Continue PTA inhalers, discussed with patient limiting albuterol  - Continue to follow VS per protocol  - Continue supplemental oxygen        Post Medication Reconciliation Status: Patient was not discharged from an inpatient facility or TCU        Orders:  Change Augmentin from 250 mg tid to 500 mg bid x 7 days    Electronically signed by: Alla Moy PA-C             Sincerely,        Alla Moy PA-C

## 2022-09-16 NOTE — TELEPHONE ENCOUNTER
Xray back and shows bronchitis and possible infiltrates in the lower lobes.    UA was looked at by this NP since nurse mentioned it but that looks negative.    Has not been started on any ABX and nursing read off the allergies.  Petite person.    Orders:  Augmentin 250mg TID for 7 days.  Monitor for allergy since allergy to a cephalosporin medication.    Will pass this to the regular NP and she can make any adjustments.    Electronically signed by She Newton RN, CNP

## 2022-09-20 NOTE — PROGRESS NOTES
[facility] GERIATRICS DISCHARGE SUMMARY  PATIENT'S NAME: Marie Kline  YOB: 1932  MEDICAL RECORD NUMBER:  [id number]  Place of Service where encounter took place:  [facility] ([facility]) [[id number]]    PRIMARY CARE PROVIDER AND CLINIC RESPONSIBLE AFTER TRANSFER:   [facility] Dumont MD, [address] [facility] [address]    [facility]     Transferring providers: Alla Moy PA-C, Jocelin Archibald MD  Recent Hospitalization/ED:  [facility] Hospital stay 8/25/22 to 8/30/22.  Date of SNF Admission: August 30, 2022  Date of SNF (anticipated) Discharge: September 22, 2022  Discharged to: previous assisted living  Cognitive Scores: SLUMS: 19/30 and CPT: 4.875/5.6  Physical Function: Ambulating 150 ft with FWW SBA/CGA  DME: No new DME needed    CODE STATUS/ADVANCE DIRECTIVES DISCUSSION:  DNR/DNI  ALLERGIES: Diagnostic x-ray materials, Contrast dye, Prolia [denosumab], Rocephin [ceftriaxone], and Wound dressing adhesive    NURSING FACILITY COURSE   Medication Changes/Rationale:     See below    Summary of nursing facility stay:    Marie Kline is a 89-year-old female with a past medical history of oxygen dependent COPD, CLL, thrombocytopenia, and history of Takotsubo cardiomyopathy who was admitted to TCU following a hospitalization for right femoral neck fracture.  She had recently been hospitalized at [facility] 8/6-8/2011 for acute on chronic respiratory failure due to COPD exacerbation.  Hospital course was complicated by weakness so she was discharged to TCU and subsequently back to her independent living on 8/22.  Unfortunately shortly after discharge to independent living she suffered a mechanical fall and was brought back to the emergency department.  She was found to have a right femoral neck fracture and underwent surgical fixation by Dr. Ferguson 8/26.  Postoperative course was uncomplicated and she is now discharged to U.    While at U she did  develop a fever.  UA negative.  CBC with chronic leukocytosis in the setting of CLL.  Chest x-ray was read as a left-sided infiltrate, though previous imaging had read scarring in the left lower lobe so unclear of significance.  Ultimately started on a course of Augmentin.  She progressed with therapy.  She will be discharging back to her assisted living       Fever, resolved  Possible Left Sided pneumonia  Chronic Hypoxia  Oxygen dependent COPD: Fever up to 101.8. No localizing symptoms. Now resolved. UA bland. CBC with chronic leukocytosis in the setting of CLL. CXR with possible left lower lobe infiltrate. On call started Augmentin. I do note previous CXR with noted linear scarring in LLL so unclear if truly new infiltrate but given true fever will elect to treat  -Continue Augmentin from 250 mg tid to 500 mg bid x 7 days, course to be completed 9/22  - Continue PTA inhalers  - Continue supplemental oxygen    Mechanical fall  Right femoral neck fracture status post surgical repair 8/27:  -Continue pain control with scheduled Tylenol and as needed tramadol. Using minimal tramadol. Will send with small supply from facility  - PT/OT, walking 135 ft with FWW with therapy  - Seen by [facility] NP at facility.  No postop issues.  Okay to leave right hip incision open to air and get wet.  No submerging until greater than 6-week postop appointment  - Follow-up with orthopedics 10/11    History of Takotsubo cardiomyopathy with now normalized EF  Essential HTN:   -Continue metoprolol XL 25 mg daily and Lasix 20 mg daily. BP in acceptable range  - Monitor volume status and electrolytes. Some edema in RLE likely due to recent fracture. Weights down since admission to TCU    CLL  Chronic anemia with acute blood loss  Thrombocytopenia: Follows with Dr. Ivory [facility] [place] [facility].  Receives IVIG every 6 weeks.  Baseline WBC .  Baseline hemoglobin 8/9, though fell to 7.0 postoperatively.  Baseline platelets  and noted to  be 58 at time of discharge  - Continue ferrous sulfate 325 mg daily  - CBC 9/16 with leukocytosis 60.7 which is stable.  Hemoglobin of 8.3  - Follow up with Hematology    Mild hyponatremia, stable: Sodium 135 at time of discharge  - Sodium trend during TCU stay 136--135    Anxiety  Insomnia  Cognitive impairment:   -Continue Lexapro 10 mg daily and trazodone 50 mg daily  - OT consult- SLUMS 19 CPT 4.875    Hypothyroidism  - Continue Synthroid    Protein Malnutrition  -Facility nutritionist to follow  - Weekly weights    Chronic kidney disease stage IIIa: Baseline creatinine 0.6-0.8. Noted to be 0.6 at discharge  - BMP stable at 0.75 9/14    Discharge Medications:    Post Medication Reconciliation Status:      Current Outpatient Medications   Medication Sig Dispense Refill     acetaminophen (TYLENOL) 325 MG tablet Take 2 tablets (650 mg) by mouth 4 times daily. May also take 1-2 tablets (325-650 mg) 2 times daily as needed for fever or pain.       albuterol (PROAIR HFA/PROVENTIL HFA/VENTOLIN HFA) 108 (90 Base) MCG/ACT inhaler Inhale 2 puffs into the lungs every 6 hours (Patient taking differently: Inhale 2 puffs into the lungs every 6 hours as needed) 1 each 11     amoxicillin-clavulanate (AUGMENTIN) 500-125 MG tablet Take 1 tablet by mouth 2 times daily for 7 days 14 tablet 0     bisacodyl (DULCOLAX) 10 MG suppository Place 1 suppository (10 mg) rectally daily as needed for constipation       budesonide (PULMICORT) 0.5 MG/2ML neb solution Take 2 mLs (0.5 mg) by nebulization 2 times daily 120 mL 11     escitalopram (LEXAPRO) 10 MG tablet Take 1 tablet (10 mg) by mouth daily 30 tablet 3     ferrous sulfate (IRON) 325 (65 FE) MG tablet Take 325 mg by mouth daily (with breakfast)        furosemide (LASIX) 20 MG tablet Take 20 mg by mouth daily       levothyroxine (SYNTHROID/LEVOTHROID) 88 MCG tablet Take 1 tablet (88 mcg) by mouth daily 90 tablet 0     loperamide (IMODIUM A-D) 2 MG tablet Take 2 mg by mouth every 4  hours as needed for diarrhea       metoprolol succinate ER (TOPROL XL) 25 MG 24 hr tablet Take 1 tablet (25 mg) by mouth daily 90 tablet 2     Multiple Vitamins-Minerals (MULTIVITAMIN ADULT) CHEW Take 2 chew tab by mouth daily       polyethylene glycol (MIRALAX) 17 GM/Dose powder Take 17 g by mouth 2 times daily While on narcotics to prevent constipation 510 g      Revefenacin 175 MCG/3ML SOLN Inhale 3 mLs (175 mcg) into the lungs daily 90 mL 11     senna-docusate (SENOKOT-S/PERICOLACE) 8.6-50 MG tablet Take 1 tablet by mouth 2 times daily as needed for constipation 30 tablet 0     traMADol (ULTRAM) 50 MG tablet Take 0.5-1 tablets (25-50 mg) by mouth every 6 hours as needed for moderate to severe pain (Take 0.5 tab (25 mg) for pain 6-8. Take 1 tab (50 mg) for pain 9-10.) 20 tablet 0     traZODone (DESYREL) 50 MG tablet Take 1 tablet (50 mg) by mouth At Bedtime 90 tablet 0     ACE/ARB/ARNI NOT PRESCRIBED (INTENTIONAL) Please choose reason not prescribed from choices below. (Patient not taking: No sig reported)       Immune Globulin, Human, (OCTAGAM) 5 GM/100ML SOLN into the vein every 6 weeks.    Hold this medication while in TCU-resume at discharge from TCU (Patient not taking: No sig reported)            Controlled medications:   Tramadol 25 mg. Ok to dispense 10 tablets from facility supply     Past Medical History:   Past Medical History:   Diagnosis Date     Arthritis     Generalized     Bladder cancer (H)      CLL (chronic lymphocytic leukemia) (H)      Cognitive impairment     16/30 slums     Congestive heart failure (H) 10/24/2014    flash pulm edema ass w/Takotsubo     COPD (chronic obstructive pulmonary disease) (H)     chronic O2 2L/NC     Dysphagia     on DD3     Hypertension      Hypothyroid      Mumps      Pulmonary edema cardiac cause (H) 10/08/2014    associated w/Takotsubo ACS     Stress-induced cardiomyopathy 10/2018    cath with minimal dz,  EF 30-35 %improved to 60-65 % by echo 3/2021      "Tricuspid valve disorders, 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    TR 2-3+ per echo     Physical Exam:   Vitals: /76   Pulse 85   Temp 98.2  F (36.8  C)   Resp 16   Ht 1.499 m (4' 11\")   Wt 38.1 kg (84 lb)   LMP  (LMP Unknown)   SpO2 97%   BMI 16.97 kg/m    BMI: Body mass index is 16.97 kg/m .  Physical Exam  Vitals and nursing note reviewed.   Constitutional:       Comments: Frail appearing   HENT:      Head: Normocephalic and atraumatic.   Eyes:      General: No scleral icterus.  Cardiovascular:      Rate and Rhythm: Normal rate and regular rhythm.      Heart sounds: No murmur heard.  Pulmonary:      Effort: Pulmonary effort is normal.      Breath sounds: No wheezing.      Comments: On O2. Course breath sounds bilaterally   Musculoskeletal:      Comments: 1+ RLE   Skin:     General: Skin is warm and dry.   Neurological:      General: No focal deficit present.      Mental Status: She is alert. Mental status is at baseline.   Psychiatric:         Mood and Affect: Mood normal.         Behavior: Behavior normal.           SNF labs: Recent labs in [facility] reviewed by me today.  and   Most Recent 3 CBC's:Recent Labs   Lab Test 09/16/22  0544 09/13/22  0546 09/08/22  0722 09/02/22  0706 08/29/22  0617   WBC 60.7*  --   --  73.6* 77.9*   HGB 8.3* 9.0* 8.1* 7.7* 7.7*   MCV 97  --   --  100 100     --   --  98* 58*     Most Recent 3 BMP's:Recent Labs   Lab Test 09/14/22  0617 09/02/22  0706 08/29/22  0617   * 136 135*   POTASSIUM 4.7 4.1 4.3   CHLORIDE 95* 98 99   CO2 33* 32* 32*   BUN 13.5 10.6 12.2   CR 0.75 0.56 0.60   ANIONGAP 7 6* 4*   CARLOS 9.4 8.9 8.6   * 93 105*       DISCHARGE PLAN:    Follow up labs: Continue to follow with hematology for routine labs    Medical Follow Up:      Follow up with primary care provider in 1-2 weeks   Continue routine follow up with Oncology   Follow up with Orthopedics 10/11       Current [place] scheduled appointments:  Next 5 appointments (look out 90 days) "    Oct 03, 2022 11:30 AM  Return Visit with Marva Powell MD  [facility] [address]  [facility] [address]  [phone]           Discharge Services: Home Care:  Physical Therapy, Speech Therapy , Home Health Aide and     Discharge Instructions Verbalized to Patient at Discharge:     Wound care Dressing/Wound care: Ok to leave incision open to air and get wet. Do not scrub, soak, or submerge incision until 6 weeks post op..     TOTAL DISCHARGE TIME:   Greater than 30 minutes  Electronically signed by:  Alla Moy PA-C     Documentation of Face to Face and Certification for Home Health Services    I certify that patient: Marie Kline is under my care and that I, or a nurse practitioner or physician's assistant working with me, had a face-to-face encounter that meets the physician face-to-face encounter requirements with this patient on: 9/20/2022.    This encounter with the patient was in whole, or in part, for the following medical condition, which is the primary reason for home health care:   1. Pneumonia of left lung due to infectious organism, unspecified part of lung    2. Chronic bronchitis, unspecified chronic bronchitis type (H)    3. Closed fracture of neck of right femur with routine healing, subsequent encounter    4. Hypothyroidism, unspecified type    5. Essential (primary) hypertension    6. Acute on chronic respiratory failure with hypoxia and hypercapnia (H)    7. Stage 3a chronic kidney disease (H)    8. Fever, unspecified fever cause    9. CLL (chronic lymphocytic leukemia) (HCC)    10. Stress-induced cardiomyopathy          I certify that, based on my findings, the following services are medically necessary home health services: Nursing, Occupational Therapy, Physical Therapy and Social Work.    My clinical findings support the need for the above  services because: Nurse is needed: For complex aftercare of surgical procedures because the patient needs instruction and cannot perform care on their own due to: recent hip fracture.., Occupational Therapy Services are needed to assess and treat cognitive ability and address ADL safety due to impairment in hip fracture. and Physical Therapy Services are needed to assess and treat the following functional impairments: hip fracture.    Further, I certify that my clinical findings support that this patient is homebound (i.e. absences from home require considerable and taxing effort and are for medical reasons or Christianity services or infrequently or of short duration when for other reasons) because: Requires assistance of another person or specialized equipment to access medical services because patient: Requires supervision of another for safe transfer...    Based on the above findings. I certify that this patient is confined to the home and needs intermittent skilled nursing care, physical therapy and/or speech therapy.  The patient is under my care, and I have initiated the establishment of the plan of care.  This patient will be followed by a physician who will periodically review the plan of care.  Physician/Provider to provide follow up care: Be Dumont    Attending hospital physician (the Medicare certified [facility] provider): Dr. Jocelin Archibald DO    Physician Signature: See electronic signature associated with these discharge orders.  Date: 9/20/2022

## 2022-09-20 NOTE — LETTER
9/20/2022        RE: Marie Kline  [address]  [facility] [address]        [facility] GERIATRICS DISCHARGE SUMMARY  PATIENT'S NAME: Marie Kline  YOB: 1932  MEDICAL RECORD NUMBER:  [id number]  Place of Service where encounter took place:  [facility] ([facility]) [[id number]]    PRIMARY CARE PROVIDER AND CLINIC RESPONSIBLE AFTER TRANSFER:   Be Dumont MD, [address] / [facility] [address]    [facility] [place] [place] Provider     Transferring providers: Alla Moy PA-C, Jocelin Archibald MD  Recent Hospitalization/ED:  [facility] Hospital stay 8/25/22 to 8/30/22.  Date of SNF Admission: August 30, 2022  Date of SNF (anticipated) Discharge: September 22, 2022  Discharged to: previous assisted living  Cognitive Scores: SLUMS: 19/30 and CPT: 4.875/5.6  Physical Function: Ambulating 150 ft with FWW SBA/CGA  DME: No new DME needed    CODE STATUS/ADVANCE DIRECTIVES DISCUSSION:  DNR/DNI  ALLERGIES: Diagnostic x-ray materials, Contrast dye, Prolia [denosumab], Rocephin [ceftriaxone], and Wound dressing adhesive    NURSING FACILITY COURSE   Medication Changes/Rationale:     See below    Summary of nursing facility stay:    Marie Kline is a 89-year-old female with a past medical history of oxygen dependent COPD, CLL, thrombocytopenia, and history of Takotsubo cardiomyopathy who was admitted to [facility] following a hospitalization for right femoral neck fracture.  She had recently been hospitalized at [facility] 8/6-8/2011 for acute on chronic respiratory failure due to COPD exacerbation.  Hospital course was complicated by weakness so she was discharged to TCU and subsequently back to her independent living on 8/22.  Unfortunately shortly after discharge to independent living she suffered a mechanical fall and was brought back to the emergency department.  She was found to have a right femoral neck fracture and underwent surgical fixation by Dr. Ferguson  8/26.  Postoperative course was uncomplicated and she is now discharged to TCU.    While at TCU she did develop a fever.  UA negative.  CBC with chronic leukocytosis in the setting of CLL.  Chest x-ray was read as a left-sided infiltrate, though previous imaging had read scarring in the left lower lobe so unclear of significance.  Ultimately started on a course of Augmentin.  She progressed with therapy.  She will be discharging back to her assisted living       Fever, resolved  Possible Left Sided pneumonia  Chronic Hypoxia  Oxygen dependent COPD: Fever up to 101.8. No localizing symptoms. Now resolved. UA bland. CBC with chronic leukocytosis in the setting of CLL. CXR with possible left lower lobe infiltrate. On call started Augmentin. I do note previous CXR with noted linear scarring in LLL so unclear if truly new infiltrate but given true fever will elect to treat  -Continue Augmentin from 250 mg tid to 500 mg bid x 7 days, course to be completed 9/22  - Continue PTA inhalers  - Continue supplemental oxygen    Mechanical fall  Right femoral neck fracture status post surgical repair 8/27:  -Continue pain control with scheduled Tylenol and as needed tramadol. Using minimal tramadol. Will send with small supply from facility  - PT/OT, walking 135 ft with FWW with therapy  - Seen by [facility] NP at facility.  No postop issues.  Okay to leave right hip incision open to air and get wet.  No submerging until greater than 6-week postop appointment  - Follow-up with orthopedics 10/11    History of Takotsubo cardiomyopathy with now normalized EF  Essential HTN:   -Continue metoprolol XL 25 mg daily and Lasix 20 mg daily. BP in acceptable range  - Monitor volume status and electrolytes. Some edema in RLE likely due to recent fracture. Weights down since admission to TCU    CLL  Chronic anemia with acute blood loss  Thrombocytopenia: Follows with Dr. Ivory [facility] [place] [facility].  Receives IVIG every 6 weeks.  Baseline WBC .   Baseline hemoglobin 8/9, though fell to 7.0 postoperatively.  Baseline platelets  and noted to be 58 at time of discharge  - Continue ferrous sulfate 325 mg daily  - CBC 9/16 with leukocytosis 60.7 which is stable.  Hemoglobin of 8.3  - Follow up with Hematology    Mild hyponatremia, stable: Sodium 135 at time of discharge  - Sodium trend during TCU stay 136--135    Anxiety  Insomnia  Cognitive impairment:   -Continue Lexapro 10 mg daily and trazodone 50 mg daily  - OT consult- SLUMS 19 CPT 4.875    Hypothyroidism  - Continue Synthroid    Protein Malnutrition  -Facility nutritionist to follow  - Weekly weights    Chronic kidney disease stage IIIa: Baseline creatinine 0.6-0.8. Noted to be 0.6 at discharge  - BMP stable at 0.75 9/14    Discharge Medications:    Post Medication Reconciliation Status:      Current Outpatient Medications   Medication Sig Dispense Refill     acetaminophen (TYLENOL) 325 MG tablet Take 2 tablets (650 mg) by mouth 4 times daily. May also take 1-2 tablets (325-650 mg) 2 times daily as needed for fever or pain.       albuterol (PROAIR HFA/PROVENTIL HFA/VENTOLIN HFA) 108 (90 Base) MCG/ACT inhaler Inhale 2 puffs into the lungs every 6 hours (Patient taking differently: Inhale 2 puffs into the lungs every 6 hours as needed) 1 each 11     amoxicillin-clavulanate (AUGMENTIN) 500-125 MG tablet Take 1 tablet by mouth 2 times daily for 7 days 14 tablet 0     bisacodyl (DULCOLAX) 10 MG suppository Place 1 suppository (10 mg) rectally daily as needed for constipation       budesonide (PULMICORT) 0.5 MG/2ML neb solution Take 2 mLs (0.5 mg) by nebulization 2 times daily 120 mL 11     escitalopram (LEXAPRO) 10 MG tablet Take 1 tablet (10 mg) by mouth daily 30 tablet 3     ferrous sulfate (IRON) 325 (65 FE) MG tablet Take 325 mg by mouth daily (with breakfast)        furosemide (LASIX) 20 MG tablet Take 20 mg by mouth daily       levothyroxine (SYNTHROID/LEVOTHROID) 88 MCG tablet Take 1 tablet (88  mcg) by mouth daily 90 tablet 0     loperamide (IMODIUM A-D) 2 MG tablet Take 2 mg by mouth every 4 hours as needed for diarrhea       metoprolol succinate ER (TOPROL XL) 25 MG 24 hr tablet Take 1 tablet (25 mg) by mouth daily 90 tablet 2     Multiple Vitamins-Minerals (MULTIVITAMIN ADULT) CHEW Take 2 chew tab by mouth daily       polyethylene glycol (MIRALAX) 17 GM/Dose powder Take 17 g by mouth 2 times daily While on narcotics to prevent constipation 510 g      Revefenacin 175 MCG/3ML SOLN Inhale 3 mLs (175 mcg) into the lungs daily 90 mL 11     senna-docusate (SENOKOT-S/PERICOLACE) 8.6-50 MG tablet Take 1 tablet by mouth 2 times daily as needed for constipation 30 tablet 0     traMADol (ULTRAM) 50 MG tablet Take 0.5-1 tablets (25-50 mg) by mouth every 6 hours as needed for moderate to severe pain (Take 0.5 tab (25 mg) for pain 6-8. Take 1 tab (50 mg) for pain 9-10.) 20 tablet 0     traZODone (DESYREL) 50 MG tablet Take 1 tablet (50 mg) by mouth At Bedtime 90 tablet 0     ACE/ARB/ARNI NOT PRESCRIBED (INTENTIONAL) Please choose reason not prescribed from choices below. (Patient not taking: No sig reported)       Immune Globulin, Human, (OCTAGAM) 5 GM/100ML SOLN into the vein every 6 weeks.    Hold this medication while in TCU-resume at discharge from TCU (Patient not taking: No sig reported)            Controlled medications:   Tramadol 25 mg. Ok to dispense 10 tablets from facility supply     Past Medical History:   Past Medical History:   Diagnosis Date     Arthritis     Generalized     Bladder cancer (H)      CLL (chronic lymphocytic leukemia) (H)      Cognitive impairment     16/30 slums     Congestive heart failure (H) 10/24/2014    flash pulm edema ass w/Takotsubo     COPD (chronic obstructive pulmonary disease) (H)     chronic O2 2L/NC     Dysphagia     on DD3     Hypertension      Hypothyroid      Mumps      Pulmonary edema cardiac cause (H) 10/08/2014    associated w/Takotsubo ACS     Stress-induced  "cardiomyopathy 10/2018    cath with minimal dz,  EF 30-35 %improved to 60-65 % by echo 3/2021     Tricuspid valve disorders, 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    TR 2-3+ per echo     Physical Exam:   Vitals: /76   Pulse 85   Temp 98.2  F (36.8  C)   Resp 16   Ht 1.499 m (4' 11\")   Wt 38.1 kg (84 lb)   LMP  (LMP Unknown)   SpO2 97%   BMI 16.97 kg/m    BMI: Body mass index is 16.97 kg/m .  Physical Exam  Vitals and nursing note reviewed.   Constitutional:       Comments: Frail appearing   HENT:      Head: Normocephalic and atraumatic.   Eyes:      General: No scleral icterus.  Cardiovascular:      Rate and Rhythm: Normal rate and regular rhythm.      Heart sounds: No murmur heard.  Pulmonary:      Effort: Pulmonary effort is normal.      Breath sounds: No wheezing.      Comments: On O2. Course breath sounds bilaterally   Musculoskeletal:      Comments: 1+ RLE   Skin:     General: Skin is warm and dry.   Neurological:      General: No focal deficit present.      Mental Status: She is alert. Mental status is at baseline.   Psychiatric:         Mood and Affect: Mood normal.         Behavior: Behavior normal.           SNF labs: Recent labs in [facility] reviewed by me today.  and   Most Recent 3 CBC's:Recent Labs   Lab Test 09/16/22  0544 09/13/22  0546 09/08/22  0722 09/02/22  0706 08/29/22  0617   WBC 60.7*  --   --  73.6* 77.9*   HGB 8.3* 9.0* 8.1* 7.7* 7.7*   MCV 97  --   --  100 100     --   --  98* 58*     Most Recent 3 BMP's:Recent Labs   Lab Test 09/14/22  0617 09/02/22  0706 08/29/22  0617   * 136 135*   POTASSIUM 4.7 4.1 4.3   CHLORIDE 95* 98 99   CO2 33* 32* 32*   BUN 13.5 10.6 12.2   CR 0.75 0.56 0.60   ANIONGAP 7 6* 4*   CARLOS 9.4 8.9 8.6   * 93 105*       DISCHARGE PLAN:    Follow up labs: Continue to follow with hematology for routine labs    Medical Follow Up:      Follow up with primary care provider in 1-2 weeks   Continue routine follow up with Oncology   Follow up with " Orthopedics 10/11       Current [place] scheduled appointments:  Next 5 appointments (look out 90 days)    Oct 03, 2022 11:30 AM  Return Visit with Marva Powell MD  [facility] ([facility] ) [address] [place]  [address]  [facility] [place] [address]  [phone]           Discharge Services: Home Care:  Physical Therapy, Speech Therapy , Home Health Aide and     Discharge Instructions Verbalized to Patient at Discharge:     Wound care Dressing/Wound care: Ok to leave incision open to air and get wet. Do not scrub, soak, or submerge incision until 6 weeks post op..     TOTAL DISCHARGE TIME:   Greater than 30 minutes  Electronically signed by:  Alla Moy PA-C     Documentation of Face to Face and Certification for Home Health Services    I certify that patient: Marie Kline is under my care and that I, or a nurse practitioner or physician's assistant working with me, had a face-to-face encounter that meets the physician face-to-face encounter requirements with this patient on: 9/20/2022.    This encounter with the patient was in whole, or in part, for the following medical condition, which is the primary reason for home health care:   1. Pneumonia of left lung due to infectious organism, unspecified part of lung    2. Chronic bronchitis, unspecified chronic bronchitis type (H)    3. Closed fracture of neck of right femur with routine healing, subsequent encounter    4. Hypothyroidism, unspecified type    5. Essential (primary) hypertension    6. Acute on chronic respiratory failure with hypoxia and hypercapnia (H)    7. Stage 3a chronic kidney disease (H)    8. Fever, unspecified fever cause    9. CLL (chronic lymphocytic leukemia) (HCC)    10. Stress-induced cardiomyopathy          I certify that, based on my findings, the following services are medically necessary home health services: Nursing,  Occupational Therapy, Physical Therapy and Social Work.    My clinical findings support the need for the above services because: Nurse is needed: For complex aftercare of surgical procedures because the patient needs instruction and cannot perform care on their own due to: recent hip fracture.., Occupational Therapy Services are needed to assess and treat cognitive ability and address ADL safety due to impairment in hip fracture. and Physical Therapy Services are needed to assess and treat the following functional impairments: hip fracture.    Further, I certify that my clinical findings support that this patient is homebound (i.e. absences from home require considerable and taxing effort and are for medical reasons or [employer] services or infrequently or of short duration when for other reasons) because: Requires assistance of another person or specialized equipment to access medical services because patient: Requires supervision of another for safe transfer...    Based on the above findings. I certify that this patient is confined to the home and needs intermittent skilled nursing care, physical therapy and/or speech therapy.  The patient is under my care, and I have initiated the establishment of the plan of care.  This patient will be followed by a physician who will periodically review the plan of care.  Physician/Provider to provide follow up care: Be Dumont    Attending hospital physician (the Medicare certified [facility] provider): Dr. Jocelin Archibald DO    Physician Signature: See electronic signature associated with these discharge orders.  Date: 9/20/2022                  Sincerely,        Alla Moy PA-C

## 2022-09-20 NOTE — PATIENT INSTRUCTIONS
Marie Kline  YOB: 1932                                   Discharge Date: 9/22/22      PHYSICIAN's DISCHARGE SUMMARY / ORDER SHEET  1. Discharge to: Home  2. Medications:      Current Outpatient Medications   Medication Sig Dispense Refill    acetaminophen (TYLENOL) 325 MG tablet Take 2 tablets (650 mg) by mouth 4 times daily. May also take 1-2 tablets (325-650 mg) 2 times daily as needed for fever or pain.      albuterol (PROAIR HFA/PROVENTIL HFA/VENTOLIN HFA) 108 (90 Base) MCG/ACT inhaler Inhale 2 puffs into the lungs every 6 hours (Patient taking differently: Inhale 2 puffs into the lungs every 6 hours as needed) 1 each 11    amoxicillin-clavulanate (AUGMENTIN) 500-125 MG tablet Take 1 tablet by mouth 2 times daily for 7 days 14 tablet 0    bisacodyl (DULCOLAX) 10 MG suppository Place 1 suppository (10 mg) rectally daily as needed for constipation      budesonide (PULMICORT) 0.5 MG/2ML neb solution Take 2 mLs (0.5 mg) by nebulization 2 times daily 120 mL 11    escitalopram (LEXAPRO) 10 MG tablet Take 1 tablet (10 mg) by mouth daily 30 tablet 3    ferrous sulfate (IRON) 325 (65 FE) MG tablet Take 325 mg by mouth daily (with breakfast)       furosemide (LASIX) 20 MG tablet Take 20 mg by mouth daily      levothyroxine (SYNTHROID/LEVOTHROID) 88 MCG tablet Take 1 tablet (88 mcg) by mouth daily 90 tablet 0    loperamide (IMODIUM A-D) 2 MG tablet Take 2 mg by mouth every 4 hours as needed for diarrhea      metoprolol succinate ER (TOPROL XL) 25 MG 24 hr tablet Take 1 tablet (25 mg) by mouth daily 90 tablet 2    Multiple Vitamins-Minerals (MULTIVITAMIN ADULT) CHEW Take 2 chew tab by mouth daily      polyethylene glycol (MIRALAX) 17 GM/Dose powder Take 17 g by mouth 2 times daily While on narcotics to prevent constipation 510 g     Revefenacin 175 MCG/3ML SOLN Inhale 3 mLs (175 mcg) into the lungs daily 90 mL 11    senna-docusate (SENOKOT-S/PERICOLACE) 8.6-50 MG tablet Take 1 tablet by mouth 2 times daily as  needed for constipation 30 tablet 0    traMADol (ULTRAM) 50 MG tablet Take 0.5-1 tablets (25-50 mg) by mouth every 6 hours as needed for moderate to severe pain (Take 0.5 tab (25 mg) for pain 6-8. Take 1 tab (50 mg) for pain 9-10.) 20 tablet 0    traZODone (DESYREL) 50 MG tablet Take 1 tablet (50 mg) by mouth At Bedtime 90 tablet 0    ACE/ARB/ARNI NOT PRESCRIBED (INTENTIONAL) Please choose reason not prescribed from choices below. (Patient not taking: No sig reported)      Immune Globulin, Human, (OCTAGAM) 5 GM/100ML SOLN into the vein every 6 weeks.    Hold this medication while in TCU-resume at discharge from TCU (Patient not taking: No sig reported)          DISCHARGE PLAN:  Follow up labs: Continue to follow with hematology for routine labs  Medical Follow Up:      Follow up with primary care provider in 1-2 weeks   Continue routine follow up with Oncology   Follow up with Orthopedics 10/11     [facility] scheduled appointments:  Next 5 appointments (look out 90 days)      Oct 03, 2022 11:30 AM  Return Visit with Marva Powell MD  [facility] [place] ([facility] ) [address]  [address]  [facility] [id number] [phone]           Discharge Services: Home Care:  Physical Therapy, Speech Therapy , Home Health Aide and   Discharge Instructions Verbalized to Patient at Discharge:   Wound care Dressing/Wound care: Ok to leave incision open to air and get wet. Do not scrub, soak, or submerge incision until 6 weeks post op..     Discharge patient to home with current medications and treatments  Discharge Home with Home care as above  - Nursing call in 30 days supply of needed meds to pharmacy of choice upon discharge. Future refills by PCP Dr. Be Dumont with phone number [phone].  -Tramadol 25 mg. Ok to dispense 10 tablets from facility supply  - Please send home with original of these  discharge instructions and copy for chart.       ______________________________  Alla Moy PA-C   [facility]                   9/20/2022

## 2022-09-23 NOTE — PROGRESS NOTES
"Clinic Care Coordination Contact  [facility]: Post-Discharge Note  SITUATION                                                      Admission:    Admission Date: 08/30/22   Reason for Admission: right femoral neck fracture  Discharge:   Discharge Date: 09/22/22    BACKGROUND                                                      Per TCU discharge summary and TCU provider notes:  \"Marie Kline is a 89-year-old female with a past medical history of oxygen dependent COPD, CLL, thrombocytopenia, and history of Takotsubo cardiomyopathy who was admitted to TCU following a hospitalization for right femoral neck fracture.  She had recently been hospitalized at [facility] 8/6-8/11 for acute on chronic respiratory failure due to COPD exacerbation.  Hospital course was complicated by weakness so she was discharged to TCU and subsequently back to her independent living on 8/22.  Unfortunately shortly after discharge to independent living she suffered a mechanical fall and was brought back to the emergency department.  She was found to have a right femoral neck fracture and underwent surgical fixation by Dr. Ferguson 8/26.  Postoperative course was uncomplicated and she is now discharged to TCU.     While at TCU she did develop a fever.  UA negative.  CBC with chronic leukocytosis in the setting of CLL.  Chest x-ray was read as a left-sided infiltrate, though previous imaging had read scarring in the left lower lobe so unclear of significance.  Ultimately started on a course of Augmentin.  She progressed with therapy.  She will be discharging back to her assisted living.\"    ASSESSMENT      Enrollment  Outreach Frequency: monthly    Discharge Assessment  How are you doing now that you are home?: Alone, but doing okay.  How are your symptoms? (Red Flag symptoms escalate to triage hotline per guidelines): Improved  Do you feel your condition is stable enough to be safe at home until your provider visit?: Yes  Does the patient have " their discharge instructions? : Unknown  Were you started on any new medications or were there changes to any of your previous medications? : No  Does the patient have all of their medications?: Yes  Do you have questions regarding any of your medications? : No  Do you have all of your needed medical supplies or equipment (DME)?  (i.e. oxygen tank, CPAP, cane, etc.): Yes  Discharge follow-up appointment scheduled within 14 calendar days? : No  Is patient agreeable to assistance with scheduling? : No    Post-op (Clinicians Only)  Did the patient have surgery or a procedure: Yes  Eating & Drinking: eating and drinking without complaints/concerns  PO Intake: regular diet    Care Management    SW CC outreach to patient on this day to check-in and review plan of care. Patient shared she was discharged home yesterday afternoon and is doing well. Patient unable to provide further details regarding discharge instructions/AVS. Patient reports her daughter is involved in her care, however, is away for the weekend. Patient remains home alone but voices no worries or concerns. Patient continues to utilize walker to assist with ambulation. Discussed importance of following-up with PCP in the next 1-2 weeks. Patient expressed understanding. Offered to transfer patient to scheduling line, however, patient preference is to call on her own. Patient reports her daughter provides transportation and will need to coordinate with her prior to making appointment. Patient request e-mail with scheduling contact information be sent on this day. Writer communicated the risks of unencrypted electronic communication and the patient has agreed to accept the risks and receive unencrypted communication related to the information or resources we have discussed. We reviewed that no PHI will be included.  Email address verified with the patient. E-mail sent 9/23/2022. No further CC needs identified during outreach. Encouraged patient to reach out  with any needs that may arise. Patient in agreement with plan.    After call with patient, SW CC spoke with daughter, Margaret, whom shared she is camping in [place] and hesitant about patient being alone. Validated her feelings and allowed time/space to discuss further. Margaret shared patients grand-daughter will be checking on her over the weekend as well as patients assisted living facility staff that provide daily checks x2. Margaret reports patient also has a life alert button that she wears consistently. Margaret informed writer [facility] should be starting as well. Patient appears to have necessary supports in place, Margaret in agreement. Margaret voiced no additional concerns or CC needs at this time. Discussed scheduling PCP follow-up appointment. Margaret expressed understanding.      Care Mgmt General Assessment  Referral  Referral Source: SNF/TCU  Health Care Home/Utilization  Preferred Hospital: [facility]  [phone]  Preferred Urgent Care: [facility] [place], [phone]  Living Situation  Current living arrangement:: I live in assisted living  Type of residence:: Assisted living  Resources  Patient receiving home care services:: Yes (Was supposed to start with [facility]- [phone]. Fax [phone] but they did not open with her since beting home from TCU. Patient was supposed to get RN. OT. PT.)  Skilled Home Care Services: Skilled Nursing;Home Health Aid;Physicial Therapy;Speech Therapy  Community Resources: None  Supplies Currently Used at Home: None  Equipment Currently Used at Home: walker, rolling;shower chair;grab bar, toilet;grab bar, tub/shower  Referrals Placed: None  Employment Status: retired  Psychosocial  Yarsanism or spiritual beliefs that impact treatment:: No  Mental health DX:: No  Mental health management concern (GOAL):: No  Chemical Dependency Status: No Current Concerns  Informal Support system:: Family  Functional Status  Dependent  ADLs:: Independent  Dependent IADLs:: Shopping;Transportation  Bed or wheelchair confined:: No  Mobility Status: Independent w/Device  Any fall with injury in the past year?: Yes  Advance Care Plan/Directive  Advanced Care Plans/Directives on file:: Yes  Type Advanced Care Plans/Directives: POLST  Advanced Care Plan/Directive Status: Not Applicable     Care Mgmt Encounter Assessment  Preventative Care  Routine Health maintenance Reviewed: Due/Overdue   Health Maintenance Due   Topic Date Due     HF ACTION PLAN  Never done     MICROALBUMIN  Never done     ZOSTER IMMUNIZATION (1 of 2) Never done     LIPID  01/06/2022     INFLUENZA VACCINE (1) 09/01/2022     FALL RISK ASSESSMENT  10/06/2022     Clinic Utilization  Difficulty keeping appointments:: No  No-Show Concerns: No  No PCP office visit in Past Year: No  Transportation  Transportation means:: Family  Primary Diagnosis  Primary Diagnosis: Psychosocial  Barriers in Communication  Other concerns:: None  How confident are you filling out medical forms by yourself:: Quite a bit  Pain  Pain (GOAL):: No  Medication Review  Medication adherence problem (GOAL):: No  Knowledgeable about how to use meds:: Yes  Medication side effects suspected:: No  Diet/Exercise/Sleep  Diet:: Regular  Inadequate nutrition (GOAL):: No  Tube Feeding: No  Inadequate activity/exercise (GOAL):: No  Significant changes in sleep pattern (GOAL): No    PLAN                                                      Outpatient Plan: CHW will outreach to patient in one month to review goal progression. CHW will involve Lead CC as needed or if patient is ready to move to maintenance. Lead CC will continue to monitor CHW's monthly outreaches and progress to goal(s) every 6 weeks.    Future Appointments   Date Time Provider Department Center   10/3/2022 11:30 AM Marva Powell MD [facility]     For any urgent concerns, please contact our 24 hour nurse triage line: [phone]  ([phone])       DAVID Nuñez/[facility]  Social Work Care Coordinator  [facility], [place], and [place]  Phone: [phone]

## 2022-09-23 NOTE — TELEPHONE ENCOUNTER
Routing refill request to provider for review/approval because:  Labs out of range:  TSH    Prescription approved per [facility] Refill Protocol.      Writer accidentally approved Levothyroxine.  Canceled prescription.

## 2022-09-23 NOTE — LETTER
[facility] CARE COORDINATION  [address]  [facility] [address]    September 23, 2022        Marie Kline  [address]  [place] [address]          Dear Skyler Salazar is an updated Patient Centered Plan of Care for your continued enrollment in Care Coordination. Please let us know if you have additional questions, concerns, or goals that we can assist with.    Sincerely,    Aneta Thomas MSW/LICJOSIAS  Social Work Care Coordinator  [facility], [place], and [place]  Phone: [phone]        [facility]  Patient Centered Plan of Care  About Me:        Patient Name:  Marie Kline    YOB: 1932  Age:         89 year old   [place] MRN:    [id number] Telephone Information:  Home Phone [phone]   Mobile [phone]       Address:  [address] [place] [address] [address]  [facility] [place] [address] Email address:  [email or web address]      Emergency Contact(s)    Name Relationship Lgl Grd Work Phone Home Phone Mobile Phone   1. ALANA CHANDLER* Daughter No  [phone]   2. ROBIN KLINE Son No   [phone]   3. DEWAYNEEMILIA Grandchild No  [phone]   4. SAWYERMADAN Grandchild No  [phone]   5. CHAPARRITA ESCALONA* Relative No   [phone]           Primary language:  English     needed? No   [place] [employer]:  [phone] op. 1  Other communication barriers:None  Preferred Method of Communication:  Mail    Current living arrangement: I live in assisted living  Mobility Status/ Medical Equipment: Independent w/Device    Health Maintenance  Health Maintenance Reviewed: Due/Overdue   Health Maintenance Due   Topic Date Due     HF ACTION PLAN  Never done     MICROALBUMIN  Never done     ZOSTER IMMUNIZATION (1 of 2) Never done     LIPID  01/06/2022     INFLUENZA VACCINE (1) 09/01/2022     FALL RISK ASSESSMENT  10/06/2022     My Access Plan  Medical Emergency 911    Primary Clinic Line [facility] - [phone]   24 Hour Appointment Line [phone] or  [phone]) (toll-free)   24 Hour Nurse Line [phone] (toll-free)   Preferred Urgent Care [facility] [place], [phone]     Preferred Hospital [facility]  [phone]     Preferred Pharmacy [id number]  [place], [place] - [address] [facility]     Behavioral Health Crisis Line The National Suicide Prevention Lifeline at 1-327.160.1649 or Text/Call 408         My Care Team Members  Patient Care Team       Relationship Specialty Notifications Start End    Be Dumont MD PCP - General Internal Medicine  3/22/22     Phone: [phone] Fax: [phone]         [address] [facility] [address]    Isabel Ivory MD MD Oncology  2/6/17     Phone: [phone] Fax: [phone]         [place] [facility]  [address] [facility] [address]    Aneta Thomas [facility] Lead Care Coordinator  - Clinical Admissions 5/16/19     Phone: [phone] Fax: [phone]        Kar Nuñez MD Assigned Surgical Provider   10/23/20     Phone: [phone] Fax: [phone] [address] [facility] [address] [facility] [address]    Marva Powell MD Assigned Pulmonology Provider   5/2/21     Phone: [phone] Fax: [phone]         [address] [facility] [address] [facility] [address]    Sadiq Stein DPM Assigned Musculoskeletal Provider   5/16/21     Phone: [phone] Fax: [phone]         [address] [facility] [address]    Mera Mendez MA Community Health Worker   11/10/21     Be Dumont MD Assigned PCP   2/20/22     Phone: [phone] Fax: [phone]         [address] [facility] [address]    Ksenia Silver, RN Specialty Care Coordinator Hematology & Oncology Admissions 4/11/22     Phone: [phone]         Yuliana Cast, [facility]  Assigned MTM Pharmacist   5/28/22     Phone: [phone] Pager: [phone] [address] [facility] [address] [facility] [address]    Marshall Verde [facility]    8/31/22     Phone: [phone] Fax: [phone] [address] [facility] [address]    Suzanne Mathur RPH  Pharmacist  9/13/22 7/3/23    Phone: [phone] Fax: [phone] [address] [place] [address]            My Care Plans  Self Management and Treatment Plan  Care Plan  Care Plan: Financial Wellbeing     Problem: Patient expresses financial resource strain     Goal: Create an action plan to increase financial stability     Start Date: 7/18/2022 Expected End Date: 1/18/2023    This Visit's Progress: 0%    Priority: High    Note:     Barriers: wait-time, needs to see if eligible  Strengths: Pt daughter, Margaret, assisting Pt with coordinating services  Patient expressed understanding of goal: yes  Action steps to achieve this goal:  1. I will review the resources sent by CC CHW  2. I will utilize resources as I see fit  3. I will continue working with Care Coordination                         Action Plans on File:   COPD    Advance Care Plans/Directives Type:   POLST      My Medical and Care Information  Current Medications and Allergies:    Current Outpatient Medications   Medication     ACE/ARB/ARNI NOT PRESCRIBED (INTENTIONAL)     acetaminophen (TYLENOL) 325 MG tablet     albuterol (PROAIR HFA/PROVENTIL HFA/VENTOLIN HFA) 108 (90 Base) MCG/ACT inhaler     amoxicillin-clavulanate (AUGMENTIN) 500-125 MG tablet     bisacodyl (DULCOLAX) 10 MG suppository     budesonide (PULMICORT) 0.5 MG/2ML neb solution     escitalopram (LEXAPRO) 10 MG tablet     ferrous sulfate (IRON) 325 (65 FE) MG tablet     furosemide (LASIX) 20 MG tablet     Immune Globulin, Human, (OCTAGAM) 5 GM/100ML SOLN     levothyroxine (SYNTHROID/LEVOTHROID) 88 MCG tablet     loperamide (IMODIUM A-D) 2 MG tablet     metoprolol succinate ER  (TOPROL XL) 25 MG 24 hr tablet     Multiple Vitamins-Minerals (MULTIVITAMIN ADULT) CHEW     polyethylene glycol (MIRALAX) 17 GM/Dose powder     Revefenacin 175 MCG/3ML SOLN     senna-docusate (SENOKOT-S/PERICOLACE) 8.6-50 MG tablet     traMADol (ULTRAM) 50 MG tablet     traZODone (DESYREL) 50 MG tablet     No current facility-administered medications for this visit.     Allergies   Allergen Reactions     Diagnostic X-Ray Materials Hives     CT DYE     Contrast Dye Hives     CT DYE     Prolia [Denosumab]      Osteonecrosis jaw       Rocephin [Ceftriaxone] Itching     Wound Dressing Adhesive      Care Coordination Start Date: 5/16/2019   Frequency of Care Coordination: monthly     Form Last Updated: 09/23/2022

## 2022-10-21 NOTE — PROGRESS NOTES
Clinic Care Coordination Contact    Community Health Worker Follow Up    Care Gaps:     Health Maintenance Due   Topic Date Due     HF ACTION PLAN  Never done     MICROALBUMIN  Never done     ZOSTER IMMUNIZATION (1 of 2) Never done     LIPID  01/06/2022     INFLUENZA VACCINE (1) 09/01/2022     FALL RISK ASSESSMENT  10/06/2022        Margaret focused on addressing current goals.     Care Plan:   Care Plan: Financial Wellbeing     Problem: Patient expresses financial resource strain     Goal: [place].     Start Date: 7/18/2022 Expected End Date: 1/18/2023    This Visit's Progress: 0% Recent Progress: 0%    Priority: High    Note:     Barriers: wait-time, needs to see if eligible  Strengths: Pt daughter, Margaret, assisting Pt with coordinating services  Patient expressed understanding of goal: yes  Action steps to achieve this goal:  1. I will review the resources sent by CC CHW  2. I will utilize resources as I see fit  3. I will continue working with Care Coordination  (Goal Deleted)                    Care Plan: Health Concerns     Problem: Health Concerns     Goal: I will help my mom address her health concerns.     Start Date: 10/21/2022 Expected End Date: 1/20/2023    Note:     Barriers: Age, Daughter has own health appointments  Strengths: Motivated  Patient expressed understanding of goal: yes  Action steps to achieve this goal:  1. I understand that CHW will email me necessary contact information to [email or web address]  2. I will help Pt schedule appointments to address GI, Medication, and memory loss concerns if Pt wishes.  3. I will continue working with Care Coordination.                         Intervention and Education during outreach:    Writer was able to connect with Margaret, the Pts daughter to address their above goals. As [place] and is able to access resources [place], [place] shared the [place] no longer a fitting option; Writer to delete goal.      Margaret did share that Pt has some health concerns they would like to be addressed. Margaret shared that Pt is starting to have difficulties with short-term memory and it is worsening fast. Margaret also noted that they would like to address Pts Diarrhea and medications. Writer to include Yuliana POMPA call back number in email for support regarding medications. Margaret is agreeable to this. After discussion, Margaret would like CHW to send clinic P. Number ([phone]) to schedule an appointment with PCP to address memory issues and GI scheduling line ([phone]) to address diarrhea. Margaret would like this information to be sent via email as she is in the car during the time of call. Writer communicated the risks of unencrypted electronic communication and the patient and/or patient representative has agreed to accept the risks and receive unencrypted communication related to the information or resources we have discussed. We reviewed that no PHI will be included.  Email address verified with the patient.  Will include electronic communication form for patient/caregiver to complete.     Email sent on 10/21/2022  Writer inquired if Margaret had any other questions or concerns at this time, however she did not.     CHW Plan: Writer will reach out to Pt in 1 month to monitor the progression of the Pts goals.         ILIA Encinas.S. Public [facility]  Community Health Worker  [place]  Clinic Care Coordination  [phone]

## 2022-10-25 NOTE — PROGRESS NOTES
Clinic Care Coordination Contact    Care Coordination Clinician Chart Review    Situation: Patient chart reviewed by care coordinator.       Background: Care Coordination initial assessment and enrollment to Care Coordination was 5/16/2022. Patient centered goals were developed with participation from patient. JOSIAS CC handed patient off to CHW for continued outreach every 30 days.        Assessment: Per chart review, patient outreach completed by CC CHW on 10/21/2022. Patient is actively working to accomplish goal. Patient's goal remains appropriate and relevant at this time. Patient is not due for updated Plan of Care. Annual assessment will be due 5/2023.      Care Plan: Health Concerns     Problem: Health Concerns     Goal: I will help my mom address her health concerns.     Start Date: 10/21/2022 Expected End Date: 1/20/2023    Note:     Barriers: Age, Daughter has own health appointments  Strengths: Motivated  Patient expressed understanding of goal: yes  Action steps to achieve this goal:  1. I understand that CHW will email me necessary contact information to [email or web address]  2. I will help Pt schedule appointments to address GI, Medication, and memory loss concerns if Pt wishes.  3. I will continue working with Care Coordination.                     Plan/Recommendations: The patient will continue working with Care Coordination to achieve goal as above. CHW will involve JOSIAS CC as needed or if patient is ready to move to maintenance. JOSIAS CC will continue to monitor progress to goals and CHW outreaches every 6 weeks.   Plan of Care updated and mailed to patient: DAVID [facility]ajera[facility]  Social Work Care Coordinator  [facility] [place], and [place]  Phone: [phone]

## 2022-10-31 NOTE — TELEPHONE ENCOUNTER
Pending Prescriptions:                       Disp   Refills    albuterol (PROAIR HFA/PROVENTIL HFA/VIVEK*8.5 g  0            Sig: Inhale 2 puffs into the lungs every 6 hours          Last Written Prescription Date:  10/25/2021  Last Fill Quantity: 1,   # refills: 11  Last Office Visit: 04/04/2022  Future Office visit:    Next 5 appointments (look out 90 days)    Dec 19, 2022  9:00 AM  Return Visit with Marva Powell MD  [facility] [address]  [facility] [address]  [phone]           Routing refill request to provider for review/approval    ABDIAS King, LIANAN, RN, LAc, OCN  RN Care Coordinator  [facility]  Ph: [phone]  F: [phone]

## 2022-11-01 NOTE — TELEPHONE ENCOUNTER
Signed Prescriptions:                        Disp   Refills    albuterol (PROAIR HFA/PROVENTIL HFA/VENTOL*18 g   11       Sig: Inhale 1-2 puffs into the lungs every 6 hours as           needed  Authorizing Provider: TABITHA BATRES MAOM, LIANAN, RN, LAc, OCN  RN Care Coordinator  [facility]  Ph: [phone]  F: [phone]

## 2022-11-07 NOTE — PROGRESS NOTES
Marie called in to clinic asking for an update to see if there have been any additional inhalers. Dr. Powell placed a note with a recommended list of inhalers. Writer sent a message to pharmacy liaisons to check for more affordable options.    Writer called [facility] pharmacy and they report Advair and Simbicort do have generic options. The [facility] Pharmacist recommended sending several electronic prescriptions over and they will run them all and go with the cheapest option.     Per message from Dr. Powell, she recommends Advair 500 mcg BID and Spiriva 18 mcg daily. Writer sent prescriptions to [facility] pharmacy in [place].    Writer called Marie to update her that the pharmacy will be able to run these after they have been e-prescribed. Marie was instructed to call [facility] pharmacy to verify her OOP cost is affordable for these inhalers.     Trelegy was discontinued in pt's record as she did not wish to fill that inhaler due to cost.     Writer encouraged Marie to call in to clinic if she has any additional questions or concerns.    Ksenia Silver, RN, BSN, ABDIAS  RN Care Coordinator  [facility]  [phone]           Outreach Attempt was made to schedule Overdue Care Gap.    The Outcome of the Outreach was Contact was not made, letter/portal message sent. Care Gaps discussed included Breast and Colorectal.

## 2022-11-21 NOTE — PROGRESS NOTES
Clinic Care Coordination Contact    Community Health Worker Follow Up    Care Gaps:     Health Maintenance Due   Topic Date Due     HF ACTION PLAN  Never done     MICROALBUMIN  Never done     ZOSTER IMMUNIZATION (1 of 2) Never done     LIPID  01/06/2022     FALL RISK ASSESSMENT  10/06/2022       Margaret focused on addressing current goals.     Care Plan:   Care Plan: Health Concerns     Problem: Health Concerns     Goal: I will help my mom address her health concerns.     Start Date: 10/21/2022 Expected End Date: 1/20/2023    This Visit's Progress: 20%    Note:     Barriers: Age, Daughter has own health appointments  Strengths: Motivated  Patient expressed understanding of goal: yes  Action steps to achieve this goal:  1. I understand that CHW will email me necessary contact information to [email or web address]  2. I will help Pt schedule appointments to address (GI deleted) , Medication, and memory loss concerns if Pt wishes.  3. I will continue working with Care Coordination.                         Intervention and Education during outreach:     Writer was able to reach Margaret and address the above goals. Margaret shared that things are going fine. Margaret did receive the email sent by CHW with information for addressing the above health concerns (in goal). However, Margaret stated that at this time they are going to let GI go as Pt is taking imodium. Writer and Margaret went over that Pt has an appointment with MTM on 11/28/2022; Margaret took note of this. Writer encouraged Margaret to make an appointment with PCP to discuss health concerns. Margaret plans to schedule independently and declined Writer assistance with scheduling.     CHW Plan: Writer will reach out to Margaret in 1 month to monitor the progression of the Pts goals.       KIM Encinas. Public Health  Community Health Worker  [facility]  Clinic Care Coordination  [phone]

## 2022-11-29 NOTE — NURSING NOTE
Trouble starting a flow/  On lasix since dec.  Says her urine flow is different.  MOISE Best CMA    Prior to the start of the procedure and with procedural staff participation, I verbally confirmed the patient s identity using two indicators, relevant allergies, that the procedure was appropriate and matched the consent or emergent situation, and that the correct equipment/implants were available. Immediately prior to starting the procedure I conducted the Time Out with the procedural staff and re-confirmed the patient s name, procedure, and site/side. (The Joint Commission universal protocol was followed.)  Yes    Sedation (Moderate or Deep): None  Pt has signed the consent form stating that we will be doing a CYSTOSCOPY (with or without stent removal) today, and that it is the correct procedure. I verbally confirmed the patient s identity using two indicators, relevant allergies, and that the correct equipment was available. Post-op information given to the pt as needed at check-out. I have sent an appropriate antibiotic to the pharmacy in our building as recommended by the MD. MOISE Best CMA        
No

## 2022-11-29 NOTE — TELEPHONE ENCOUNTER
Routing refill request to provider for review/approval because:  Drug interaction warning  Dewayne VILLANUEVA RN, BSN

## 2022-11-29 NOTE — PROGRESS NOTES
Clinic Care Coordination Contact    Care Coordination Clinician Chart Review    Situation: Patient chart reviewed by care coordinator.       Background: Care Coordination initial assessment and enrollment to Care Coordination was 5/16/2019. Patient centered goals were developed with participation from patient. JOSIAS CC handed patient off to CHW for continued outreach every 30 days.        Assessment: Per chart review, patient outreach completed by CC CHW on 11/21/2022.  Patient is actively working to accomplish goal. Patient's goal remains appropriate and relevant at this time. Patient is not due for updated Plan of Care. Annual assessment will be due 5/2023.      Care Plan: Health Concerns     Problem: Health Concerns     Goal: I will help my mom address her health concerns.     Start Date: 10/21/2022 Expected End Date: 1/20/2023    This Visit's Progress: 20%    Note:     Barriers: Age, Daughter has own health appointments  Strengths: Motivated  Patient expressed understanding of goal: yes  Action steps to achieve this goal:  1. I understand that CHW will email me necessary contact information to [email or web address]; to (do)  2. I will help Pt schedule appointments to address (GI deleted) , Medication, and memory loss concerns if Pt wishes.  3. I will continue working with Care Coordination.                     Plan/Recommendations: The patient will continue working with Care Coordination to achieve goal as above. CHW will involve JOSIAS CC as needed or if patient is ready to move to maintenance. JOSIAS CC will continue to monitor progress to goals and CHW outreaches every 6 weeks.     Plan of Care updated and mailed to patient: DAVID Najera/[facility]  Social Work Care Coordinator  [facility] [place], [place], and [place]  Phone: [phone]

## 2022-12-08 NOTE — PATIENT INSTRUCTIONS
"Recommendations from today's MTM visit:                                                         Recheck thyroid labs with next lab draw    Follow-up: Return in about 6 months (around 6/8/2023) for Medication Therapy Management.    It was great speaking with you today.  I value your experience and would be very thankful for your time in providing feedback in our clinic survey. In the next few days, you may receive an email or text message from [facility] [employer] with a link to a survey related to your  clinical pharmacist.\"     To schedule another MTM appointment, please call the clinic directly or you may call the MTM scheduling line at [phone] or toll-free at [phone].     My Clinical Pharmacist's contact information:                                                      Please feel free to contact me with any questions or concerns you have.      Yuliana Cast , Pharm D  [phone] (phone)  Medication Therapy Management Pharmacist     "

## 2022-12-08 NOTE — PROGRESS NOTES
Medication Therapy Management (MTM) Encounter    ASSESSMENT:                            Medication Adherence/Access: No issues identified    Hip Fracture:  Improved.  No therapy needed at this time.    Diarrhea/Constipation:  stable    Depression/Anxiety:  stable    COPD: stable    CLL: stable    DHF/CAD: stable    Hypothyroidism: recheck thyroid labs    Insomnia: stable    PLAN:                            Recheck thyroid labs with next lab draw    Follow-up: Return in about 6 months (around 6/8/2023) for Medication Therapy Management.    SUBJECTIVE/OBJECTIVE:                          Marie Kline is a 89 year old female called for a follow-up visit.  Today's visit is a follow-up MTM visit from 6/30/22.  Discharged 9/22/22 from [facility] due to right femoral neck fracture.     Reason for visit: medication review    Allergies/ADRs: Reviewed in chart  Tobacco: She reports that she quit smoking about 26 years ago. Her smoking use included cigarettes. She has never used smokeless tobacco.  Alcohol: not currently using    Medication Adherence/Access: lives at the [facility].  Manages own medications.      Hip Fracture:  Mechanical fall that resulted in fall.  Taking tylenol as needed and tramadol as needed - none needed at this time.  Reports 'hip' is still difficult.  Uses walker at home.  No falls since being home.      Diarrhea/Constipation:  No concerns at this time. Will take Imodium as needed or Senna-S as needed.  Reports having good appetite.  Stays hydrated with water throughout the day.  Only one cup of coffee in the morning.      Depression/Anxiety:  Current medications include: escitalopram 10 mg daily.  Unsure how this is working.  Feels like she can control her anxiety.  Gets anxious to get things done.  No concerns with side effects.    PHQ 2/17/2022   PHQ-9 Total Score 1   Q9: Thoughts of better off dead/self-harm past 2 weeks Not at all     LESLY-7 SCORE 12/19/2018 4/16/2020 1/6/2021   Total Score 12 0 2        COPD: Current medications:  Proventil as needed, budesonide twice daily, Yupelri once daily.    Wears oxygen 2L 24hrs.  Pt is not experiencing side effects.   Pt reports the following symptoms: shortness of breath with walking  Pt does have an COPD Action Plan on file.   Has spirometry been completed: Yes   Followed by Marva Powell.    CLL: Getting Immune Globulin infusions every 6 weeks - due on 12/14/22.  No concerns at this time.  Followed by Oncology.    DHF/CAD: Current medications include metoprolol succinate 25 mg daily and  furosemide 20 mg daily.      Patient does not self-monitor BP.  Patient reports no current medication side effects.  BP Readings from Last 3 Encounters:   09/20/22 114/76   09/16/22 130/76   09/13/22 123/66       Hypothyroidism: Patient is taking levothyroxine 88 mcg daily (dose adjusted after last labs).  Patient is having the following symptoms: none  TSH   Date Value Ref Range Status   05/16/2022 12.93 (H) 0.40 - 4.00 mU/L Final   03/24/2021 0.81 0.40 - 4.00 mU/L Final     T4 Free   Date Value Ref Range Status   12/09/2018 1.15 0.76 - 1.46 ng/dL Final     Free T4   Date Value Ref Range Status   05/16/2022 1.02 0.76 - 1.46 ng/dL Final       Insomnia: Current medications include: trazodone 100 mg at bedtime. Pt reports no issues. Sleeping well at night.  No side effects.  May need to get up to use the bathroom but able to fall back to sleep.          Today's Vitals: LMP  (LMP Unknown)   ----------------      I spent 19 minutes with this patient today. All changes were made via collaborative practice agreement with Be Dumont MD. A copy of the visit note was provided to the patient's provider(s).    A summary of these recommendations was given to the patient and was sent via [email or web address].    Yuliana Cast , Pharm D  [phone] (phone)  Medication Therapy Management Pharmacist     Telemedicine Visit Details  Type of service:  Telephone visit  Start Time: 1030am  End Time:  10:49 AM  Originating Location (pt. Location): Home      Distant Location (provider location):  On-site  Provider has received verbal consent for a visit from the patient? Yes     Medication Therapy Recommendations  No medication therapy recommendations to display

## 2022-12-14 NOTE — TELEPHONE ENCOUNTER
Routing refill request to provider for review/approval because:  Drug not on the [facility] refill protocol   Dewayne Spangler RN, BSN

## 2022-12-19 NOTE — PROGRESS NOTES
"Writer called patient to follow up with her, as patient no showed her appointment today:     Per patient report, she is \"coughing a lot and I am very tired\". Patient checked her oxygen saturation while writer was on the phone with her, and reported that on 2LPM, O2: 92% and HR: 65  She reports no fevers, nausea or vomiting/diarrhea. She reports she is eating and drinking okay. She states she has some pain in her neck, that is NOT new, but states it is hard to turn not left.     Writer spoke with Dr. Powell directly to update her of these developments. Per Dr. Powell, patient should get assess if work of breathing is difficult or oxygen saturation drop below 92%. Writer called patient to update her of these recommendations.     Patient states she prefers to call to reschedule today's appointment after the first of the year, okay per Dr. Powell. Writer gave patient clinic number, [phone], to call to reschedule.    Kriss Licea RN on 12/19/2022 at 12:20 PM          "

## 2022-12-21 NOTE — PROGRESS NOTES
Clinic Care Coordination Contact    Community Health Worker Follow Up    Care Gaps:     Health Maintenance Due   Topic Date Due     HF ACTION PLAN  Never done     MICROALBUMIN  Never done     ZOSTER IMMUNIZATION (1 of 2) Never done     LIPID  01/06/2022     FALL RISK ASSESSMENT  10/06/2022       Margaret focused on addressing current goals.    Care Plan:   Care Plan: Health Concerns     Problem: Health Concerns     Goal: I will help my mom address her health concerns.     Start Date: 10/21/2022 Expected End Date: 1/20/2023    This Visit's Progress: 60% Recent Progress: 20%    Note:     Barriers: Age, Daughter has own health appointments  Strengths: Motivated  Patient expressed understanding of goal: yes  Action steps to achieve this goal:  1. I understand that CHW will email me necessary contact information to [email or web address]  2. I will help Pt schedule appointments to address (GI deleted) , Medication (completed) and memory loss concerns if Pt wishes.  3. I will continue working with Care Coordination.                         Intervention and Education during outreach:      was able to connect with Margaret regarding the above goals. Margaret shared that everything seems to be going okay now. Margaret mentions that a nurse came to see Pt the other day to take a look at neck as Pt is experiencing neck pain from arthritis. Margaret shares they recommended tylenol and ointment to help. They are going to check on Pt again in February.      and Margaret addressed memory issues. Margaret shares that that Pt is concerned about this.  offered to merge a  on to make an appointment with PCP to address memory issues, however Margaret declined stating she has a lot of things going on. Margaret would like to discuss appointment during next CC outreach.    CHW Plan: Writer will reach out to Margaret in about 1 month to monitor the progression of the Pts goals.         KIM Encinas. [facility]  Worker  [place] [facility]  Clinic Care Coordination  [phone]

## 2023-01-01 ENCOUNTER — HOSPITAL ENCOUNTER (INPATIENT)
Facility: CLINIC | Age: 88
LOS: 4 days | Discharge: NURSING, REHAB OR LONG-TERM CARE | DRG: 193 | End: 2023-01-29
Attending: EMERGENCY MEDICINE | Admitting: INTERNAL MEDICINE
Payer: MEDICARE

## 2023-01-01 ENCOUNTER — APPOINTMENT (OUTPATIENT)
Dept: PHYSICAL THERAPY | Facility: CLINIC | Age: 88
DRG: 193 | End: 2023-01-01
Payer: MEDICARE

## 2023-01-01 ENCOUNTER — APPOINTMENT (OUTPATIENT)
Dept: GENERAL RADIOLOGY | Facility: CLINIC | Age: 88
End: 2023-01-01
Attending: EMERGENCY MEDICINE
Payer: MEDICARE

## 2023-01-01 ENCOUNTER — APPOINTMENT (OUTPATIENT)
Dept: CT IMAGING | Facility: CLINIC | Age: 88
DRG: 193 | End: 2023-01-01
Attending: EMERGENCY MEDICINE
Payer: MEDICARE

## 2023-01-01 ENCOUNTER — TRANSFERRED RECORDS (OUTPATIENT)
Dept: HEALTH INFORMATION MANAGEMENT | Facility: CLINIC | Age: 88
End: 2023-01-01
Payer: MEDICARE

## 2023-01-01 ENCOUNTER — APPOINTMENT (OUTPATIENT)
Dept: GENERAL RADIOLOGY | Facility: CLINIC | Age: 88
DRG: 193 | End: 2023-01-01
Attending: EMERGENCY MEDICINE
Payer: MEDICARE

## 2023-01-01 ENCOUNTER — TRANSFERRED RECORDS (OUTPATIENT)
Dept: HEALTH INFORMATION MANAGEMENT | Facility: CLINIC | Age: 88
End: 2023-01-01

## 2023-01-01 ENCOUNTER — APPOINTMENT (OUTPATIENT)
Dept: PHYSICAL THERAPY | Facility: CLINIC | Age: 88
DRG: 193 | End: 2023-01-01
Attending: INTERNAL MEDICINE
Payer: MEDICARE

## 2023-01-01 ENCOUNTER — LAB REQUISITION (OUTPATIENT)
Dept: LAB | Facility: CLINIC | Age: 88
End: 2023-01-01
Payer: MEDICARE

## 2023-01-01 ENCOUNTER — PATIENT OUTREACH (OUTPATIENT)
Dept: CARE COORDINATION | Facility: CLINIC | Age: 88
End: 2023-01-01

## 2023-01-01 ENCOUNTER — PATIENT OUTREACH (OUTPATIENT)
Dept: CARE COORDINATION | Facility: CLINIC | Age: 88
End: 2023-01-01
Payer: MEDICARE

## 2023-01-01 ENCOUNTER — PATIENT OUTREACH (OUTPATIENT)
Dept: ONCOLOGY | Facility: CLINIC | Age: 88
End: 2023-01-01

## 2023-01-01 ENCOUNTER — TRANSITIONAL CARE UNIT VISIT (OUTPATIENT)
Dept: GERIATRICS | Facility: CLINIC | Age: 88
End: 2023-01-01
Payer: MEDICARE

## 2023-01-01 ENCOUNTER — HOSPITAL ENCOUNTER (EMERGENCY)
Facility: CLINIC | Age: 88
Discharge: NURSING, REHAB OR LONG-TERM CARE | End: 2023-07-29
Attending: EMERGENCY MEDICINE | Admitting: EMERGENCY MEDICINE
Payer: MEDICARE

## 2023-01-01 ENCOUNTER — APPOINTMENT (OUTPATIENT)
Dept: GENERAL RADIOLOGY | Facility: CLINIC | Age: 88
End: 2023-01-01
Attending: EMERGENCY MEDICINE
Payer: OTHER MISCELLANEOUS

## 2023-01-01 ENCOUNTER — HOSPITAL ENCOUNTER (EMERGENCY)
Facility: CLINIC | Age: 88
Discharge: HOME | End: 2023-07-03
Attending: EMERGENCY MEDICINE | Admitting: EMERGENCY MEDICINE
Payer: OTHER MISCELLANEOUS

## 2023-01-01 ENCOUNTER — TELEPHONE (OUTPATIENT)
Dept: GERIATRICS | Facility: CLINIC | Age: 88
End: 2023-01-01
Payer: MEDICARE

## 2023-01-01 VITALS
RESPIRATION RATE: 20 BRPM | DIASTOLIC BLOOD PRESSURE: 54 MMHG | TEMPERATURE: 99 F | OXYGEN SATURATION: 97 % | HEART RATE: 66 BPM | SYSTOLIC BLOOD PRESSURE: 123 MMHG

## 2023-01-01 VITALS
DIASTOLIC BLOOD PRESSURE: 59 MMHG | RESPIRATION RATE: 18 BRPM | BODY MASS INDEX: 16.8 KG/M2 | SYSTOLIC BLOOD PRESSURE: 107 MMHG | WEIGHT: 83.2 LBS | HEART RATE: 67 BPM | OXYGEN SATURATION: 95 % | TEMPERATURE: 97.7 F

## 2023-01-01 VITALS
RESPIRATION RATE: 18 BRPM | SYSTOLIC BLOOD PRESSURE: 97 MMHG | OXYGEN SATURATION: 92 % | DIASTOLIC BLOOD PRESSURE: 56 MMHG | WEIGHT: 90.2 LBS | TEMPERATURE: 98.7 F | HEIGHT: 59 IN | BODY MASS INDEX: 18.18 KG/M2 | HEART RATE: 84 BPM

## 2023-01-01 VITALS
HEIGHT: 59 IN | HEART RATE: 79 BPM | SYSTOLIC BLOOD PRESSURE: 130 MMHG | OXYGEN SATURATION: 92 % | WEIGHT: 88.8 LBS | RESPIRATION RATE: 16 BRPM | DIASTOLIC BLOOD PRESSURE: 70 MMHG | BODY MASS INDEX: 17.9 KG/M2 | TEMPERATURE: 97.8 F

## 2023-01-01 VITALS
SYSTOLIC BLOOD PRESSURE: 142 MMHG | DIASTOLIC BLOOD PRESSURE: 65 MMHG | HEART RATE: 60 BPM | TEMPERATURE: 98.9 F | OXYGEN SATURATION: 99 % | RESPIRATION RATE: 18 BRPM

## 2023-01-01 VITALS
BODY MASS INDEX: 17.9 KG/M2 | RESPIRATION RATE: 18 BRPM | SYSTOLIC BLOOD PRESSURE: 143 MMHG | HEIGHT: 59 IN | DIASTOLIC BLOOD PRESSURE: 72 MMHG | HEART RATE: 70 BPM | OXYGEN SATURATION: 92 % | WEIGHT: 88.8 LBS | TEMPERATURE: 97.6 F

## 2023-01-01 VITALS
SYSTOLIC BLOOD PRESSURE: 116 MMHG | RESPIRATION RATE: 16 BRPM | WEIGHT: 84.6 LBS | OXYGEN SATURATION: 97 % | BODY MASS INDEX: 17.09 KG/M2 | DIASTOLIC BLOOD PRESSURE: 49 MMHG | TEMPERATURE: 98.9 F | HEART RATE: 70 BPM

## 2023-01-01 DIAGNOSIS — J44.9 CHRONIC OBSTRUCTIVE PULMONARY DISEASE, UNSPECIFIED COPD TYPE (H): ICD-10-CM

## 2023-01-01 DIAGNOSIS — R41.89 COGNITIVE IMPAIRMENT: ICD-10-CM

## 2023-01-01 DIAGNOSIS — Z29.9 ENCOUNTER FOR PROPHYLACTIC MEASURES, UNSPECIFIED: ICD-10-CM

## 2023-01-01 DIAGNOSIS — E44.0 MODERATE MALNUTRITION (H): ICD-10-CM

## 2023-01-01 DIAGNOSIS — D72.829 LEUKOCYTOSIS, UNSPECIFIED TYPE: ICD-10-CM

## 2023-01-01 DIAGNOSIS — U07.1 INFECTION DUE TO 2019 NOVEL CORONAVIRUS: Primary | ICD-10-CM

## 2023-01-01 DIAGNOSIS — R13.10 DYSPHAGIA, UNSPECIFIED TYPE: ICD-10-CM

## 2023-01-01 DIAGNOSIS — R53.81 PHYSICAL DECONDITIONING: ICD-10-CM

## 2023-01-01 DIAGNOSIS — G47.00 INSOMNIA, UNSPECIFIED TYPE: ICD-10-CM

## 2023-01-01 DIAGNOSIS — I10 ESSENTIAL HYPERTENSION: ICD-10-CM

## 2023-01-01 DIAGNOSIS — J18.9 PNEUMONIA OF LEFT LOWER LOBE DUE TO INFECTIOUS ORGANISM: Primary | ICD-10-CM

## 2023-01-01 DIAGNOSIS — S50.01XA CONTUSION OF RIGHT ELBOW, INITIAL ENCOUNTER: ICD-10-CM

## 2023-01-01 DIAGNOSIS — J96.21 ACUTE ON CHRONIC RESPIRATORY FAILURE WITH HYPOXEMIA (H): ICD-10-CM

## 2023-01-01 DIAGNOSIS — C91.10 CLL (CHRONIC LYMPHOCYTIC LEUKEMIA) (H): ICD-10-CM

## 2023-01-01 DIAGNOSIS — J18.9 PNEUMONIA OF LEFT LUNG DUE TO INFECTIOUS ORGANISM, UNSPECIFIED PART OF LUNG: Primary | ICD-10-CM

## 2023-01-01 DIAGNOSIS — F41.1 GENERALIZED ANXIETY DISORDER: ICD-10-CM

## 2023-01-01 DIAGNOSIS — S32.401A CLOSED DISPLACED FRACTURE OF RIGHT ACETABULUM, UNSPECIFIED PORTION OF ACETABULUM, INITIAL ENCOUNTER (H): ICD-10-CM

## 2023-01-01 DIAGNOSIS — R19.7 DIARRHEA, UNSPECIFIED TYPE: ICD-10-CM

## 2023-01-01 DIAGNOSIS — I50.9 CHRONIC CONGESTIVE HEART FAILURE, UNSPECIFIED HEART FAILURE TYPE (H): ICD-10-CM

## 2023-01-01 DIAGNOSIS — Z85.51 HISTORY OF BLADDER CANCER: ICD-10-CM

## 2023-01-01 DIAGNOSIS — U07.1 COVID-19: ICD-10-CM

## 2023-01-01 DIAGNOSIS — J18.9 COMMUNITY ACQUIRED PNEUMONIA, UNSPECIFIED LATERALITY: Primary | ICD-10-CM

## 2023-01-01 DIAGNOSIS — D64.9 CHRONIC ANEMIA: ICD-10-CM

## 2023-01-01 DIAGNOSIS — J96.11 CHRONIC RESPIRATORY FAILURE WITH HYPOXIA (H): ICD-10-CM

## 2023-01-01 DIAGNOSIS — G47.9 SLEEP DIFFICULTIES: ICD-10-CM

## 2023-01-01 DIAGNOSIS — E03.9 HYPOTHYROIDISM, UNSPECIFIED TYPE: ICD-10-CM

## 2023-01-01 DIAGNOSIS — Z86.79 HISTORY OF CARDIOMYOPATHY: ICD-10-CM

## 2023-01-01 DIAGNOSIS — S01.01XA SCALP LACERATION, INITIAL ENCOUNTER: ICD-10-CM

## 2023-01-01 DIAGNOSIS — I50.42 CHRONIC COMBINED SYSTOLIC (CONGESTIVE) AND DIASTOLIC (CONGESTIVE) HEART FAILURE (H): ICD-10-CM

## 2023-01-01 DIAGNOSIS — J18.9 COMMUNITY ACQUIRED PNEUMONIA, UNSPECIFIED LATERALITY: ICD-10-CM

## 2023-01-01 DIAGNOSIS — J96.21 ACUTE ON CHRONIC RESPIRATORY FAILURE WITH HYPOXIA AND HYPERCAPNIA (H): ICD-10-CM

## 2023-01-01 DIAGNOSIS — Z11.1 ENCOUNTER FOR SCREENING FOR RESPIRATORY TUBERCULOSIS: ICD-10-CM

## 2023-01-01 DIAGNOSIS — R19.02 LEFT UPPER QUADRANT ABDOMINAL MASS: ICD-10-CM

## 2023-01-01 DIAGNOSIS — F41.9 ANXIETY: ICD-10-CM

## 2023-01-01 DIAGNOSIS — R52 PAIN: ICD-10-CM

## 2023-01-01 DIAGNOSIS — I42.8 NONISCHEMIC CARDIOMYOPATHY (H): Primary | ICD-10-CM

## 2023-01-01 DIAGNOSIS — J96.22 ACUTE ON CHRONIC RESPIRATORY FAILURE WITH HYPOXIA AND HYPERCAPNIA (H): ICD-10-CM

## 2023-01-01 DIAGNOSIS — J18.9 PNEUMONIA DUE TO INFECTIOUS ORGANISM, UNSPECIFIED LATERALITY, UNSPECIFIED PART OF LUNG: ICD-10-CM

## 2023-01-01 DIAGNOSIS — E03.9 HYPOTHYROIDISM, UNSPECIFIED TYPE: Primary | ICD-10-CM

## 2023-01-01 DIAGNOSIS — D64.9 ANEMIA, UNSPECIFIED: ICD-10-CM
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
## 2023-01-01 DIAGNOSIS — D69.6 THROMBOCYTOPENIA (H): ICD-10-CM

## 2023-01-01 DIAGNOSIS — K59.01 SLOW TRANSIT CONSTIPATION: ICD-10-CM

## 2023-01-01 DIAGNOSIS — J44.1 COPD EXACERBATION (H): Primary | ICD-10-CM
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
## 2023-01-01 LAB
ALBUMIN SERPL BCG-MCNC: 3.8 G/DL (ref 3.5–5.2)
ALBUMIN UR-MCNC: 30 MG/DL
ALP SERPL-CCNC: 93 U/L (ref 35–104)
ALT SERPL W P-5'-P-CCNC: 11 U/L (ref 10–35)
ANION GAP SERPL CALCULATED.3IONS-SCNC: 4 MMOL/L (ref 7–15)
ANION GAP SERPL CALCULATED.3IONS-SCNC: 8 MMOL/L (ref 7–15)
ANION GAP SERPL CALCULATED.3IONS-SCNC: 9 MMOL/L (ref 7–15)
APPEARANCE UR: CLEAR
AST SERPL W P-5'-P-CCNC: 23 U/L (ref 10–35)
ATRIAL RATE - MUSE: 72 BPM
BACTERIA BLD CULT: NO GROWTH
BACTERIA BLD CULT: NO GROWTH
BASOPHILS # BLD MANUAL: 0 10E3/UL (ref 0–0.2)
BASOPHILS # BLD MANUAL: 0 10E3/UL (ref 0–0.2)
BASOPHILS NFR BLD MANUAL: 0 %
BASOPHILS NFR BLD MANUAL: 0 %
BILIRUB SERPL-MCNC: 0.4 MG/DL
BILIRUB UR QL STRIP: NEGATIVE
BUN SERPL-MCNC: 13.1 MG/DL (ref 8–23)
BUN SERPL-MCNC: 14.1 MG/DL (ref 8–23)
BUN SERPL-MCNC: 14.8 MG/DL (ref 8–23)
BUN SERPL-MCNC: 16.8 MG/DL (ref 8–23)
BUN SERPL-MCNC: 19.1 MG/DL (ref 8–23)
CALCIUM SERPL-MCNC: 8.5 MG/DL (ref 8.2–9.6)
CALCIUM SERPL-MCNC: 8.7 MG/DL (ref 8.2–9.6)
CALCIUM SERPL-MCNC: 8.7 MG/DL (ref 8.2–9.6)
CALCIUM SERPL-MCNC: 8.9 MG/DL (ref 8.2–9.6)
CALCIUM SERPL-MCNC: 8.9 MG/DL (ref 8.2–9.6)
CHLORIDE SERPL-SCNC: 100 MMOL/L (ref 98–107)
CHLORIDE SERPL-SCNC: 103 MMOL/L (ref 98–107)
CHLORIDE SERPL-SCNC: 96 MMOL/L (ref 98–107)
CHLORIDE SERPL-SCNC: 98 MMOL/L (ref 98–107)
CHLORIDE SERPL-SCNC: 98 MMOL/L (ref 98–107)
COLOR UR AUTO: YELLOW
CREAT SERPL-MCNC: 0.75 MG/DL (ref 0.51–0.95)
CREAT SERPL-MCNC: 0.78 MG/DL (ref 0.51–0.95)
CREAT SERPL-MCNC: 0.78 MG/DL (ref 0.51–0.95)
CREAT SERPL-MCNC: 0.79 MG/DL (ref 0.51–0.95)
CREAT SERPL-MCNC: 0.83 MG/DL (ref 0.51–0.95)
CREAT SERPL-MCNC: 0.88 MG/DL (ref 0.51–0.95)
CREAT SERPL-MCNC: 1.08 MG/DL (ref 0.51–0.95)
D DIMER PPP FEU-MCNC: 1.18 UG/ML FEU (ref 0–0.5)
DACRYOCYTES BLD QL SMEAR: SLIGHT
DEPRECATED HCO3 PLAS-SCNC: 30 MMOL/L (ref 22–29)
DEPRECATED HCO3 PLAS-SCNC: 30 MMOL/L (ref 22–29)
DEPRECATED HCO3 PLAS-SCNC: 33 MMOL/L (ref 22–29)
DEPRECATED HCO3 PLAS-SCNC: 34 MMOL/L (ref 22–29)
DEPRECATED HCO3 PLAS-SCNC: 36 MMOL/L (ref 22–29)
DIASTOLIC BLOOD PRESSURE - MUSE: NORMAL MMHG
ELLIPTOCYTES BLD QL SMEAR: SLIGHT
EOSINOPHIL # BLD MANUAL: 0 10E3/UL (ref 0–0.7)
EOSINOPHIL # BLD MANUAL: 0 10E3/UL (ref 0–0.7)
EOSINOPHIL NFR BLD MANUAL: 0 %
EOSINOPHIL NFR BLD MANUAL: 0 %
ERYTHROCYTE [DISTWIDTH] IN BLOOD BY AUTOMATED COUNT: 16.5 % (ref 10–15)
ERYTHROCYTE [DISTWIDTH] IN BLOOD BY AUTOMATED COUNT: 16.6 % (ref 10–15)
ERYTHROCYTE [DISTWIDTH] IN BLOOD BY AUTOMATED COUNT: 16.7 % (ref 10–15)
ERYTHROCYTE [DISTWIDTH] IN BLOOD BY AUTOMATED COUNT: 17.5 % (ref 10–15)
FLUAV RNA SPEC QL NAA+PROBE: NEGATIVE
FLUBV RNA RESP QL NAA+PROBE: NEGATIVE
GFR SERPL CREATININE-BSD FRML MDRD: 48 ML/MIN/1.73M2
GFR SERPL CREATININE-BSD FRML MDRD: 62 ML/MIN/1.73M2
GFR SERPL CREATININE-BSD FRML MDRD: 67 ML/MIN/1.73M2
GFR SERPL CREATININE-BSD FRML MDRD: 71 ML/MIN/1.73M2
GFR SERPL CREATININE-BSD FRML MDRD: 72 ML/MIN/1.73M2
GFR SERPL CREATININE-BSD FRML MDRD: 72 ML/MIN/1.73M2
GFR SERPL CREATININE-BSD FRML MDRD: 75 ML/MIN/1.73M2
GLUCOSE SERPL-MCNC: 114 MG/DL (ref 70–99)
GLUCOSE SERPL-MCNC: 121 MG/DL (ref 70–99)
GLUCOSE SERPL-MCNC: 91 MG/DL (ref 70–99)
GLUCOSE SERPL-MCNC: 97 MG/DL (ref 70–99)
GLUCOSE SERPL-MCNC: 98 MG/DL (ref 70–99)
GLUCOSE UR STRIP-MCNC: NEGATIVE MG/DL
HCT VFR BLD AUTO: 27.5 % (ref 35–47)
HCT VFR BLD AUTO: 29.7 % (ref 35–47)
HCT VFR BLD AUTO: 30.8 % (ref 35–47)
HCT VFR BLD AUTO: 33.5 % (ref 35–47)
HGB BLD-MCNC: 8.1 G/DL (ref 11.7–15.7)
HGB BLD-MCNC: 8.7 G/DL (ref 11.7–15.7)
HGB BLD-MCNC: 9.1 G/DL (ref 11.7–15.7)
HGB BLD-MCNC: 9.9 G/DL (ref 11.7–15.7)
HGB UR QL STRIP: NEGATIVE
INTERPRETATION ECG - MUSE: NORMAL
KETONES UR STRIP-MCNC: NEGATIVE MG/DL
LACTATE SERPL-SCNC: 0.5 MMOL/L (ref 0.7–2)
LEUKOCYTE ESTERASE UR QL STRIP: NEGATIVE
LYMPHOCYTES # BLD MANUAL: 70.9 10E3/UL (ref 0.8–5.3)
LYMPHOCYTES # BLD MANUAL: 98.3 10E3/UL (ref 0.8–5.3)
LYMPHOCYTES NFR BLD MANUAL: 89 %
LYMPHOCYTES NFR BLD MANUAL: 92 %
MCH RBC QN AUTO: 28.3 PG (ref 26.5–33)
MCH RBC QN AUTO: 28.4 PG (ref 26.5–33)
MCH RBC QN AUTO: 28.5 PG (ref 26.5–33)
MCH RBC QN AUTO: 29.3 PG (ref 26.5–33)
MCHC RBC AUTO-ENTMCNC: 29.3 G/DL (ref 31.5–36.5)
MCHC RBC AUTO-ENTMCNC: 29.5 G/DL (ref 31.5–36.5)
MCHC RBC AUTO-ENTMCNC: 29.5 G/DL (ref 31.5–36.5)
MCHC RBC AUTO-ENTMCNC: 29.6 G/DL (ref 31.5–36.5)
MCV RBC AUTO: 100 FL (ref 78–100)
MCV RBC AUTO: 96 FL (ref 78–100)
MCV RBC AUTO: 96 FL (ref 78–100)
MCV RBC AUTO: 97 FL (ref 78–100)
MONOCYTES # BLD MANUAL: 4 10E3/UL (ref 0–1.3)
MONOCYTES # BLD MANUAL: 4.3 10E3/UL (ref 0–1.3)
MONOCYTES NFR BLD MANUAL: 4 %
MONOCYTES NFR BLD MANUAL: 5 %
NEUTROPHILS # BLD MANUAL: 2.1 10E3/UL (ref 1.6–8.3)
NEUTROPHILS # BLD MANUAL: 4.8 10E3/UL (ref 1.6–8.3)
NEUTROPHILS NFR BLD MANUAL: 2 %
NEUTROPHILS NFR BLD MANUAL: 6 %
NITRATE UR QL: NEGATIVE
P AXIS - MUSE: 86 DEGREES
PH UR STRIP: 6.5 [PH] (ref 5–7)
PLASMA CELLS # BLD MANUAL: 2.1 10E3/UL
PLASMA CELLS NFR BLD MANUAL: 2 %
PLAT MORPH BLD: ABNORMAL
PLAT MORPH BLD: NORMAL
PLATELET # BLD AUTO: 140 10E3/UL (ref 150–450)
PLATELET # BLD AUTO: 69 10E3/UL (ref 150–450)
PLATELET # BLD AUTO: 87 10E3/UL (ref 150–450)
PLATELET # BLD AUTO: 91 10E3/UL (ref 150–450)
PLATELET # BLD AUTO: 92 10E3/UL (ref 150–450)
POTASSIUM SERPL-SCNC: 4 MMOL/L (ref 3.4–5.3)
POTASSIUM SERPL-SCNC: 4.4 MMOL/L (ref 3.4–5.3)
POTASSIUM SERPL-SCNC: 4.5 MMOL/L (ref 3.4–5.3)
POTASSIUM SERPL-SCNC: 4.5 MMOL/L (ref 3.4–5.3)
POTASSIUM SERPL-SCNC: 5.4 MMOL/L (ref 3.4–5.3)
POTASSIUM SERPL-SCNC: 5.6 MMOL/L (ref 3.4–5.3)
PR INTERVAL - MUSE: 244 MS
PROCALCITONIN SERPL IA-MCNC: 0.15 NG/ML
PROT SERPL-MCNC: 6.2 G/DL (ref 6.4–8.3)
QRS DURATION - MUSE: 86 MS
QT - MUSE: 402 MS
QTC - MUSE: 440 MS
R AXIS - MUSE: 84 DEGREES
RBC # BLD AUTO: 2.84 10E6/UL (ref 3.8–5.2)
RBC # BLD AUTO: 2.97 10E6/UL (ref 3.8–5.2)
RBC # BLD AUTO: 3.2 10E6/UL (ref 3.8–5.2)
RBC # BLD AUTO: 3.5 10E6/UL (ref 3.8–5.2)
RBC MORPH BLD: ABNORMAL
RBC MORPH BLD: NORMAL
RBC URINE: 1 /HPF
RSV RNA SPEC NAA+PROBE: NEGATIVE
SARS-COV-2 RNA RESP QL NAA+PROBE: NEGATIVE
SARS-COV-2 RNA RESP QL NAA+PROBE: POSITIVE
SMUDGE CELLS BLD QL SMEAR: PRESENT
SODIUM SERPL-SCNC: 136 MMOL/L (ref 136–145)
SODIUM SERPL-SCNC: 137 MMOL/L (ref 136–145)
SODIUM SERPL-SCNC: 137 MMOL/L (ref 136–145)
SODIUM SERPL-SCNC: 142 MMOL/L (ref 136–145)
SODIUM SERPL-SCNC: 143 MMOL/L (ref 136–145)
SP GR UR STRIP: 1.01 (ref 1–1.03)
SQUAMOUS EPITHELIAL: 1 /HPF
SYSTOLIC BLOOD PRESSURE - MUSE: NORMAL MMHG
T AXIS - MUSE: 81 DEGREES
TROPONIN T SERPL HS-MCNC: 35 NG/L
TROPONIN T SERPL HS-MCNC: 35 NG/L
TSH SERPL DL<=0.005 MIU/L-ACNC: 0.93 UIU/ML (ref 0.3–4.2)
UROBILINOGEN UR STRIP-MCNC: NORMAL MG/DL
VARIANT LYMPHS BLD QL SMEAR: PRESENT
VENTRICULAR RATE- MUSE: 72 BPM
WBC # BLD AUTO: 106.8 10E3/UL (ref 4–11)
WBC # BLD AUTO: 114.1 10E3/UL (ref 4–11)
WBC # BLD AUTO: 75.2 10E3/UL (ref 4–11)
WBC # BLD AUTO: 79.7 10E3/UL (ref 4–11)
WBC URINE: 1 /HPF

## 2023-01-01 PROCEDURE — 99305 1ST NF CARE MODERATE MDM 35: CPT | Performed by: INTERNAL MEDICINE

## 2023-01-01 PROCEDURE — 250N000013 HC RX MED GY IP 250 OP 250 PS 637: Performed by: INTERNAL MEDICINE

## 2023-01-01 PROCEDURE — 99316 NF DSCHRG MGMT 30 MIN+: CPT | Performed by: FAMILY MEDICINE

## 2023-01-01 PROCEDURE — 250N000011 HC RX IP 250 OP 636: Performed by: INTERNAL MEDICINE

## 2023-01-01 PROCEDURE — 85027 COMPLETE CBC AUTOMATED: CPT | Performed by: INTERNAL MEDICINE

## 2023-01-01 PROCEDURE — 93005 ELECTROCARDIOGRAM TRACING: CPT

## 2023-01-01 PROCEDURE — 96376 TX/PRO/DX INJ SAME DRUG ADON: CPT

## 2023-01-01 PROCEDURE — 94640 AIRWAY INHALATION TREATMENT: CPT

## 2023-01-01 PROCEDURE — 250N000009 HC RX 250

## 2023-01-01 PROCEDURE — 258N000003 HC RX IP 258 OP 636: Performed by: EMERGENCY MEDICINE

## 2023-01-01 PROCEDURE — U0003 INFECTIOUS AGENT DETECTION BY NUCLEIC ACID (DNA OR RNA); SEVERE ACUTE RESPIRATORY SYNDROME CORONAVIRUS 2 (SARS-COV-2) (CORONAVIRUS DISEASE [COVID-19]), AMPLIFIED PROBE TECHNIQUE, MAKING USE OF HIGH THROUGHPUT TECHNOLOGIES AS DESCRIBED BY CMS-2020-01-R: HCPCS | Performed by: NURSE PRACTITIONER

## 2023-01-01 PROCEDURE — 85007 BL SMEAR W/DIFF WBC COUNT: CPT | Performed by: EMERGENCY MEDICINE

## 2023-01-01 PROCEDURE — 85379 FIBRIN DEGRADATION QUANT: CPT | Performed by: EMERGENCY MEDICINE

## 2023-01-01 PROCEDURE — 99284 EMERGENCY DEPT VISIT MOD MDM: CPT | Mod: 25

## 2023-01-01 PROCEDURE — 99239 HOSP IP/OBS DSCHRG MGMT >30: CPT | Performed by: STUDENT IN AN ORGANIZED HEALTH CARE EDUCATION/TRAINING PROGRAM

## 2023-01-01 PROCEDURE — 99223 1ST HOSP IP/OBS HIGH 75: CPT | Mod: AI | Performed by: INTERNAL MEDICINE

## 2023-01-01 PROCEDURE — 87637 SARSCOV2&INF A&B&RSV AMP PRB: CPT | Performed by: EMERGENCY MEDICINE

## 2023-01-01 PROCEDURE — 999N000157 HC STATISTIC RCP TIME EA 10 MIN

## 2023-01-01 PROCEDURE — 81001 URINALYSIS AUTO W/SCOPE: CPT | Performed by: EMERGENCY MEDICINE

## 2023-01-01 PROCEDURE — 250N000009 HC RX 250: Performed by: INTERNAL MEDICINE

## 2023-01-01 PROCEDURE — 84145 PROCALCITONIN (PCT): CPT | Performed by: INTERNAL MEDICINE

## 2023-01-01 PROCEDURE — 250N000013 HC RX MED GY IP 250 OP 250 PS 637: Performed by: STUDENT IN AN ORGANIZED HEALTH CARE EDUCATION/TRAINING PROGRAM

## 2023-01-01 PROCEDURE — U0005 INFEC AGEN DETEC AMPLI PROBE: HCPCS | Performed by: NURSE PRACTITIONER

## 2023-01-01 PROCEDURE — 82565 ASSAY OF CREATININE: CPT | Performed by: INTERNAL MEDICINE

## 2023-01-01 PROCEDURE — 82947 ASSAY GLUCOSE BLOOD QUANT: CPT | Performed by: NURSE PRACTITIONER

## 2023-01-01 PROCEDURE — 36415 COLL VENOUS BLD VENIPUNCTURE: CPT | Performed by: INTERNAL MEDICINE

## 2023-01-01 PROCEDURE — 250N000011 HC RX IP 250 OP 636: Performed by: EMERGENCY MEDICINE

## 2023-01-01 PROCEDURE — 99283 EMERGENCY DEPT VISIT LOW MDM: CPT

## 2023-01-01 PROCEDURE — 84484 ASSAY OF TROPONIN QUANT: CPT | Performed by: EMERGENCY MEDICINE

## 2023-01-01 PROCEDURE — 99232 SBSQ HOSP IP/OBS MODERATE 35: CPT | Performed by: INTERNAL MEDICINE

## 2023-01-01 PROCEDURE — 99310 SBSQ NF CARE HIGH MDM 45: CPT | Performed by: NURSE PRACTITIONER

## 2023-01-01 PROCEDURE — 96374 THER/PROPH/DIAG INJ IV PUSH: CPT

## 2023-01-01 PROCEDURE — P9604 ONE-WAY ALLOW PRORATED TRIP: HCPCS | Performed by: NURSE PRACTITIONER

## 2023-01-01 PROCEDURE — 85049 AUTOMATED PLATELET COUNT: CPT | Performed by: INTERNAL MEDICINE

## 2023-01-01 PROCEDURE — 85027 COMPLETE CBC AUTOMATED: CPT | Performed by: NURSE PRACTITIONER

## 2023-01-01 PROCEDURE — 83605 ASSAY OF LACTIC ACID: CPT | Performed by: EMERGENCY MEDICINE

## 2023-01-01 PROCEDURE — 85027 COMPLETE CBC AUTOMATED: CPT | Performed by: EMERGENCY MEDICINE

## 2023-01-01 PROCEDURE — C9803 HOPD COVID-19 SPEC COLLECT: HCPCS

## 2023-01-01 PROCEDURE — 250N000013 HC RX MED GY IP 250 OP 250 PS 637: Performed by: EMERGENCY MEDICINE

## 2023-01-01 PROCEDURE — 120N000001 HC R&B MED SURG/OB

## 2023-01-01 PROCEDURE — 96361 HYDRATE IV INFUSION ADD-ON: CPT

## 2023-01-01 PROCEDURE — 99232 SBSQ HOSP IP/OBS MODERATE 35: CPT | Performed by: STUDENT IN AN ORGANIZED HEALTH CARE EDUCATION/TRAINING PROGRAM

## 2023-01-01 PROCEDURE — 87040 BLOOD CULTURE FOR BACTERIA: CPT | Performed by: EMERGENCY MEDICINE

## 2023-01-01 PROCEDURE — 97116 GAIT TRAINING THERAPY: CPT | Mod: GP

## 2023-01-01 PROCEDURE — 97116 GAIT TRAINING THERAPY: CPT | Mod: GP | Performed by: PHYSICAL THERAPIST

## 2023-01-01 PROCEDURE — 36415 COLL VENOUS BLD VENIPUNCTURE: CPT | Performed by: EMERGENCY MEDICINE

## 2023-01-01 PROCEDURE — 94640 AIRWAY INHALATION TREATMENT: CPT | Mod: 76

## 2023-01-01 PROCEDURE — 97530 THERAPEUTIC ACTIVITIES: CPT | Mod: GP

## 2023-01-01 PROCEDURE — 80048 BASIC METABOLIC PNL TOTAL CA: CPT | Performed by: INTERNAL MEDICINE

## 2023-01-01 PROCEDURE — 80048 BASIC METABOLIC PNL TOTAL CA: CPT | Performed by: EMERGENCY MEDICINE

## 2023-01-01 PROCEDURE — 99309 SBSQ NF CARE MODERATE MDM 30: CPT | Mod: CS | Performed by: NURSE PRACTITIONER

## 2023-01-01 PROCEDURE — 73552 X-RAY EXAM OF FEMUR 2/>: CPT | Mod: RT

## 2023-01-01 PROCEDURE — 71046 X-RAY EXAM CHEST 2 VIEWS: CPT

## 2023-01-01 PROCEDURE — G1010 CDSM STANSON: HCPCS

## 2023-01-01 PROCEDURE — 97161 PT EVAL LOW COMPLEX 20 MIN: CPT | Mod: GP

## 2023-01-01 PROCEDURE — 99285 EMERGENCY DEPT VISIT HI MDM: CPT | Mod: 25

## 2023-01-01 PROCEDURE — 72170 X-RAY EXAM OF PELVIS: CPT

## 2023-01-01 PROCEDURE — 96374 THER/PROPH/DIAG INJ IV PUSH: CPT | Mod: 59

## 2023-01-01 PROCEDURE — 84132 ASSAY OF SERUM POTASSIUM: CPT | Performed by: INTERNAL MEDICINE

## 2023-01-01 PROCEDURE — 82310 ASSAY OF CALCIUM: CPT | Performed by: INTERNAL MEDICINE

## 2023-01-01 PROCEDURE — 74018 RADEX ABDOMEN 1 VIEW: CPT

## 2023-01-01 PROCEDURE — 80053 COMPREHEN METABOLIC PANEL: CPT | Performed by: EMERGENCY MEDICINE

## 2023-01-01 PROCEDURE — 36415 COLL VENOUS BLD VENIPUNCTURE: CPT | Performed by: NURSE PRACTITIONER

## 2023-01-01 PROCEDURE — 84443 ASSAY THYROID STIM HORMONE: CPT | Performed by: NURSE PRACTITIONER

## 2023-01-01 PROCEDURE — 97530 THERAPEUTIC ACTIVITIES: CPT | Mod: GP | Performed by: PHYSICAL THERAPIST

## 2023-01-01 RX ORDER — ALBUTEROL SULFATE 90 UG/1
1-2 AEROSOL, METERED RESPIRATORY (INHALATION) EVERY 6 HOURS PRN
Status: DISCONTINUED | OUTPATIENT
Start: 2023-01-01 | End: 2023-01-01 | Stop reason: HOSPADM

## 2023-01-01 RX ORDER — IOPAMIDOL 755 MG/ML
500 INJECTION, SOLUTION INTRAVASCULAR ONCE
Status: COMPLETED | OUTPATIENT
Start: 2023-01-01 | End: 2023-01-01

## 2023-01-01 RX ORDER — LIDOCAINE 40 MG/G
CREAM TOPICAL
Status: DISCONTINUED | OUTPATIENT
Start: 2023-01-01 | End: 2023-01-01 | Stop reason: HOSPADM

## 2023-01-01 RX ORDER — LEVOFLOXACIN 5 MG/ML
250 INJECTION, SOLUTION INTRAVENOUS EVERY 24 HOURS
Status: DISCONTINUED | OUTPATIENT
Start: 2023-01-01 | End: 2023-01-01

## 2023-01-01 RX ORDER — LEVOTHYROXINE SODIUM 100 UG/1
100 TABLET ORAL DAILY
Status: DISCONTINUED | OUTPATIENT
Start: 2023-01-01 | End: 2023-01-01 | Stop reason: HOSPADM

## 2023-01-01 RX ORDER — LIDOCAINE HYDROCHLORIDE AND EPINEPHRINE 10; 10 MG/ML; UG/ML
INJECTION, SOLUTION INFILTRATION; PERINEURAL
Status: COMPLETED
Start: 2023-01-01 | End: 2023-01-01

## 2023-01-01 RX ORDER — LEVOTHYROXINE SODIUM 100 UG/1
88 TABLET ORAL DAILY
COMMUNITY
End: 2023-01-01

## 2023-01-01 RX ORDER — ACETAMINOPHEN 650 MG/1
650 SUPPOSITORY RECTAL EVERY 6 HOURS PRN
Status: DISCONTINUED | OUTPATIENT
Start: 2023-01-01 | End: 2023-01-01 | Stop reason: HOSPADM

## 2023-01-01 RX ORDER — LEVOFLOXACIN 5 MG/ML
500 INJECTION, SOLUTION INTRAVENOUS EVERY 24 HOURS
Status: DISCONTINUED | OUTPATIENT
Start: 2023-01-01 | End: 2023-01-01

## 2023-01-01 RX ORDER — LOPERAMIDE HCL 2 MG
CAPSULE ORAL
Qty: 60 CAPSULE | Refills: 0 | Status: SHIPPED | OUTPATIENT
Start: 2023-01-01

## 2023-01-01 RX ORDER — ONDANSETRON 2 MG/ML
4 INJECTION INTRAMUSCULAR; INTRAVENOUS EVERY 30 MIN PRN
Status: DISCONTINUED | OUTPATIENT
Start: 2023-01-01 | End: 2023-01-01 | Stop reason: HOSPADM

## 2023-01-01 RX ORDER — ACETAMINOPHEN 325 MG/1
650 TABLET ORAL EVERY 6 HOURS PRN
Status: DISCONTINUED | OUTPATIENT
Start: 2023-01-01 | End: 2023-01-01 | Stop reason: HOSPADM

## 2023-01-01 RX ORDER — BISACODYL 10 MG
10 SUPPOSITORY, RECTAL RECTAL DAILY PRN
Status: DISCONTINUED | OUTPATIENT
Start: 2023-01-01 | End: 2023-01-01 | Stop reason: HOSPADM

## 2023-01-01 RX ORDER — ESCITALOPRAM OXALATE 10 MG/1
10 TABLET ORAL DAILY
Status: DISCONTINUED | OUTPATIENT
Start: 2023-01-01 | End: 2023-01-01 | Stop reason: HOSPADM

## 2023-01-01 RX ORDER — MAGNESIUM HYDROXIDE/ALUMINUM HYDROXICE/SIMETHICONE 120; 1200; 1200 MG/30ML; MG/30ML; MG/30ML
30 SUSPENSION ORAL EVERY 4 HOURS PRN
Status: DISCONTINUED | OUTPATIENT
Start: 2023-01-01 | End: 2023-01-01 | Stop reason: HOSPADM

## 2023-01-01 RX ORDER — TRAZODONE HYDROCHLORIDE 100 MG/1
100 TABLET ORAL
Status: DISCONTINUED | OUTPATIENT
Start: 2023-01-01 | End: 2023-01-01 | Stop reason: HOSPADM

## 2023-01-01 RX ORDER — TRAZODONE HYDROCHLORIDE 50 MG/1
TABLET, FILM COATED ORAL
Qty: 180 TABLET | Refills: 1
Start: 2023-01-01 | End: 2023-01-01

## 2023-01-01 RX ORDER — MULTIPLE VITAMINS W/ MINERALS TAB 9MG-400MCG
1 TAB ORAL DAILY
Status: DISCONTINUED | OUTPATIENT
Start: 2023-01-01 | End: 2023-01-01 | Stop reason: HOSPADM

## 2023-01-01 RX ORDER — FUROSEMIDE 20 MG
TABLET ORAL
Qty: 90 TABLET | Refills: 0 | Status: SHIPPED | OUTPATIENT
Start: 2023-01-01

## 2023-01-01 RX ORDER — LANOLIN ALCOHOL/MO/W.PET/CERES
3 CREAM (GRAM) TOPICAL
Start: 2023-01-01

## 2023-01-01 RX ORDER — GUAIFENESIN 200 MG/10ML
10 LIQUID ORAL EVERY 4 HOURS PRN
Start: 2023-01-01 | End: 2023-01-01

## 2023-01-01 RX ORDER — ONDANSETRON 4 MG/1
4 TABLET, ORALLY DISINTEGRATING ORAL EVERY 6 HOURS PRN
Status: DISCONTINUED | OUTPATIENT
Start: 2023-01-01 | End: 2023-01-01 | Stop reason: HOSPADM

## 2023-01-01 RX ORDER — LEVOFLOXACIN 250 MG/1
250 TABLET, FILM COATED ORAL DAILY
Status: DISCONTINUED | OUTPATIENT
Start: 2023-01-01 | End: 2023-01-01 | Stop reason: HOSPADM

## 2023-01-01 RX ORDER — ONDANSETRON 2 MG/ML
4 INJECTION INTRAMUSCULAR; INTRAVENOUS EVERY 6 HOURS PRN
Status: DISCONTINUED | OUTPATIENT
Start: 2023-01-01 | End: 2023-01-01 | Stop reason: HOSPADM

## 2023-01-01 RX ORDER — METOPROLOL SUCCINATE 25 MG/1
25 TABLET, EXTENDED RELEASE ORAL DAILY
Status: DISCONTINUED | OUTPATIENT
Start: 2023-01-01 | End: 2023-01-01 | Stop reason: HOSPADM

## 2023-01-01 RX ORDER — LEVOTHYROXINE SODIUM 88 UG/1
88 TABLET ORAL DAILY
Start: 2023-01-01

## 2023-01-01 RX ORDER — ACETAMINOPHEN 500 MG
1000 TABLET ORAL ONCE
Status: COMPLETED | OUTPATIENT
Start: 2023-01-01 | End: 2023-01-01

## 2023-01-01 RX ORDER — LOPERAMIDE HYDROCHLORIDE 2 MG/1
2 TABLET ORAL 2 TIMES DAILY PRN
COMMUNITY

## 2023-01-01 RX ORDER — LEVOFLOXACIN 250 MG/1
250 TABLET, FILM COATED ORAL DAILY
Qty: 4 TABLET | Refills: 0 | DISCHARGE
Start: 2023-01-01 | End: 2023-01-01

## 2023-01-01 RX ORDER — DIPHENHYDRAMINE HCL 25 MG
25 CAPSULE ORAL EVERY 6 HOURS PRN
Status: DISCONTINUED | OUTPATIENT
Start: 2023-01-01 | End: 2023-01-01 | Stop reason: HOSPADM

## 2023-01-01 RX ORDER — DIPHENHYDRAMINE HYDROCHLORIDE 50 MG/ML
12.5 INJECTION INTRAMUSCULAR; INTRAVENOUS ONCE
Status: COMPLETED | OUTPATIENT
Start: 2023-01-01 | End: 2023-01-01

## 2023-01-01 RX ORDER — BUDESONIDE 0.5 MG/2ML
0.5 INHALANT ORAL 2 TIMES DAILY
Status: DISCONTINUED | OUTPATIENT
Start: 2023-01-01 | End: 2023-01-01 | Stop reason: HOSPADM

## 2023-01-01 RX ORDER — IPRATROPIUM BROMIDE AND ALBUTEROL SULFATE 2.5; .5 MG/3ML; MG/3ML
3 SOLUTION RESPIRATORY (INHALATION)
Status: DISCONTINUED | OUTPATIENT
Start: 2023-01-01 | End: 2023-01-01 | Stop reason: HOSPADM

## 2023-01-01 RX ORDER — FUROSEMIDE 20 MG
20 TABLET ORAL DAILY
Status: DISCONTINUED | OUTPATIENT
Start: 2023-01-01 | End: 2023-01-01 | Stop reason: HOSPADM

## 2023-01-01 RX ORDER — MORPHINE SULFATE 4 MG/ML
4 INJECTION, SOLUTION INTRAMUSCULAR; INTRAVENOUS
Status: COMPLETED | OUTPATIENT
Start: 2023-01-01 | End: 2023-01-01

## 2023-01-01 RX ORDER — AZITHROMYCIN 500 MG/5ML
500 INJECTION, POWDER, LYOPHILIZED, FOR SOLUTION INTRAVENOUS EVERY 24 HOURS
Status: DISCONTINUED | OUTPATIENT
Start: 2023-01-01 | End: 2023-01-01

## 2023-01-01 RX ORDER — LEVOFLOXACIN 5 MG/ML
500 INJECTION, SOLUTION INTRAVENOUS ONCE
Status: COMPLETED | OUTPATIENT
Start: 2023-01-01 | End: 2023-01-01

## 2023-01-01 RX ADMIN — TAZOBACTAM SODIUM AND PIPERACILLIN SODIUM 3.38 G: 375; 3 INJECTION, SOLUTION INTRAVENOUS at 18:23

## 2023-01-01 RX ADMIN — FUROSEMIDE 20 MG: 20 TABLET ORAL at 09:13

## 2023-01-01 RX ADMIN — Medication 500 MG: at 19:04

## 2023-01-01 RX ADMIN — LEVOTHYROXINE SODIUM 100 MCG: 0.1 TABLET ORAL at 08:37

## 2023-01-01 RX ADMIN — BUDESONIDE 0.5 MG: 0.5 INHALANT RESPIRATORY (INHALATION) at 08:34

## 2023-01-01 RX ADMIN — IPRATROPIUM BROMIDE AND ALBUTEROL SULFATE 3 ML: 2.5; .5 SOLUTION RESPIRATORY (INHALATION) at 19:18

## 2023-01-01 RX ADMIN — FUROSEMIDE 20 MG: 20 TABLET ORAL at 09:58

## 2023-01-01 RX ADMIN — METOPROLOL SUCCINATE 25 MG: 25 TABLET, EXTENDED RELEASE ORAL at 08:37

## 2023-01-01 RX ADMIN — METOPROLOL SUCCINATE 25 MG: 25 TABLET, EXTENDED RELEASE ORAL at 09:14

## 2023-01-01 RX ADMIN — LEVOTHYROXINE SODIUM 100 MCG: 0.1 TABLET ORAL at 09:14

## 2023-01-01 RX ADMIN — ESCITALOPRAM OXALATE 10 MG: 10 TABLET ORAL at 08:38

## 2023-01-01 RX ADMIN — BUDESONIDE 0.5 MG: 0.5 INHALANT RESPIRATORY (INHALATION) at 19:43

## 2023-01-01 RX ADMIN — IPRATROPIUM BROMIDE AND ALBUTEROL SULFATE 3 ML: 2.5; .5 SOLUTION RESPIRATORY (INHALATION) at 20:00

## 2023-01-01 RX ADMIN — ALBUTEROL SULFATE 2 PUFF: 90 AEROSOL, METERED RESPIRATORY (INHALATION) at 14:54

## 2023-01-01 RX ADMIN — IOPAMIDOL 45 ML: 755 INJECTION, SOLUTION INTRAVENOUS at 16:22

## 2023-01-01 RX ADMIN — IPRATROPIUM BROMIDE AND ALBUTEROL SULFATE 3 ML: 2.5; .5 SOLUTION RESPIRATORY (INHALATION) at 07:54

## 2023-01-01 RX ADMIN — IPRATROPIUM BROMIDE AND ALBUTEROL SULFATE 3 ML: 2.5; .5 SOLUTION RESPIRATORY (INHALATION) at 11:48

## 2023-01-01 RX ADMIN — IPRATROPIUM BROMIDE AND ALBUTEROL SULFATE 3 ML: 2.5; .5 SOLUTION RESPIRATORY (INHALATION) at 20:42

## 2023-01-01 RX ADMIN — LEVOFLOXACIN 250 MG: 250 INJECTION, SOLUTION INTRAVENOUS at 20:32

## 2023-01-01 RX ADMIN — LEVOFLOXACIN 500 MG: 500 INJECTION, SOLUTION INTRAVENOUS at 20:40

## 2023-01-01 RX ADMIN — BUDESONIDE 0.5 MG: 0.5 INHALANT RESPIRATORY (INHALATION) at 07:56

## 2023-01-01 RX ADMIN — FUROSEMIDE 20 MG: 20 TABLET ORAL at 08:38

## 2023-01-01 RX ADMIN — MORPHINE SULFATE 4 MG: 4 INJECTION, SOLUTION INTRAMUSCULAR; INTRAVENOUS at 20:03

## 2023-01-01 RX ADMIN — LEVOFLOXACIN 250 MG: 250 TABLET, FILM COATED ORAL at 08:38

## 2023-01-01 RX ADMIN — IPRATROPIUM BROMIDE AND ALBUTEROL SULFATE 3 ML: 2.5; .5 SOLUTION RESPIRATORY (INHALATION) at 15:31

## 2023-01-01 RX ADMIN — SODIUM CHLORIDE 100 ML: 9 INJECTION, SOLUTION INTRAVENOUS at 16:22

## 2023-01-01 RX ADMIN — LEVOFLOXACIN 250 MG: 250 TABLET, FILM COATED ORAL at 09:13

## 2023-01-01 RX ADMIN — FUROSEMIDE 20 MG: 20 TABLET ORAL at 08:33

## 2023-01-01 RX ADMIN — ACETAMINOPHEN 650 MG: 325 TABLET, FILM COATED ORAL at 12:19

## 2023-01-01 RX ADMIN — BUDESONIDE 0.5 MG: 0.5 INHALANT RESPIRATORY (INHALATION) at 20:42

## 2023-01-01 RX ADMIN — BUDESONIDE 0.5 MG: 0.5 INHALANT RESPIRATORY (INHALATION) at 07:49

## 2023-01-01 RX ADMIN — LIDOCAINE HYDROCHLORIDE,EPINEPHRINE BITARTRATE: 10; .01 INJECTION, SOLUTION INFILTRATION; PERINEURAL at 20:44

## 2023-01-01 RX ADMIN — MORPHINE SULFATE 4 MG: 4 INJECTION, SOLUTION INTRAMUSCULAR; INTRAVENOUS at 17:41

## 2023-01-01 RX ADMIN — ESCITALOPRAM OXALATE 10 MG: 10 TABLET ORAL at 08:33

## 2023-01-01 RX ADMIN — IPRATROPIUM BROMIDE AND ALBUTEROL SULFATE 3 ML: 2.5; .5 SOLUTION RESPIRATORY (INHALATION) at 07:49

## 2023-01-01 RX ADMIN — MULTIPLE VITAMINS W/ MINERALS TAB 1 TABLET: TAB at 09:13

## 2023-01-01 RX ADMIN — LEVOTHYROXINE SODIUM 100 MCG: 0.1 TABLET ORAL at 08:33

## 2023-01-01 RX ADMIN — METOPROLOL SUCCINATE 25 MG: 25 TABLET, EXTENDED RELEASE ORAL at 09:58

## 2023-01-01 RX ADMIN — LEVOTHYROXINE SODIUM 100 MCG: 0.1 TABLET ORAL at 09:58

## 2023-01-01 RX ADMIN — LEVOFLOXACIN 250 MG: 250 INJECTION, SOLUTION INTRAVENOUS at 20:45

## 2023-01-01 RX ADMIN — ESCITALOPRAM OXALATE 10 MG: 10 TABLET ORAL at 09:14

## 2023-01-01 RX ADMIN — IPRATROPIUM BROMIDE AND ALBUTEROL SULFATE 3 ML: 2.5; .5 SOLUTION RESPIRATORY (INHALATION) at 15:56

## 2023-01-01 RX ADMIN — BUDESONIDE 0.5 MG: 0.5 INHALANT RESPIRATORY (INHALATION) at 19:18

## 2023-01-01 RX ADMIN — IPRATROPIUM BROMIDE AND ALBUTEROL SULFATE 3 ML: 2.5; .5 SOLUTION RESPIRATORY (INHALATION) at 11:45

## 2023-01-01 RX ADMIN — MORPHINE SULFATE 4 MG: 4 INJECTION, SOLUTION INTRAMUSCULAR; INTRAVENOUS at 00:12

## 2023-01-01 RX ADMIN — IPRATROPIUM BROMIDE AND ALBUTEROL SULFATE 3 ML: 2.5; .5 SOLUTION RESPIRATORY (INHALATION) at 12:29

## 2023-01-01 RX ADMIN — ESCITALOPRAM OXALATE 10 MG: 10 TABLET ORAL at 09:58

## 2023-01-01 RX ADMIN — IPRATROPIUM BROMIDE AND ALBUTEROL SULFATE 3 ML: 2.5; .5 SOLUTION RESPIRATORY (INHALATION) at 15:27

## 2023-01-01 RX ADMIN — BUDESONIDE 0.5 MG: 0.5 INHALANT RESPIRATORY (INHALATION) at 07:54

## 2023-01-01 RX ADMIN — TRAZODONE HYDROCHLORIDE 100 MG: 100 TABLET ORAL at 22:41

## 2023-01-01 RX ADMIN — MULTIPLE VITAMINS W/ MINERALS TAB 1 TABLET: TAB at 08:37

## 2023-01-01 RX ADMIN — IPRATROPIUM BROMIDE AND ALBUTEROL SULFATE 3 ML: 2.5; .5 SOLUTION RESPIRATORY (INHALATION) at 19:43

## 2023-01-01 RX ADMIN — DIPHENHYDRAMINE HYDROCHLORIDE 12.5 MG: 50 INJECTION, SOLUTION INTRAMUSCULAR; INTRAVENOUS at 16:06

## 2023-01-01 RX ADMIN — IPRATROPIUM BROMIDE AND ALBUTEROL SULFATE 3 ML: 2.5; .5 SOLUTION RESPIRATORY (INHALATION) at 07:56

## 2023-01-01 RX ADMIN — IPRATROPIUM BROMIDE AND ALBUTEROL SULFATE 3 ML: 2.5; .5 SOLUTION RESPIRATORY (INHALATION) at 08:34

## 2023-01-01 RX ADMIN — BUDESONIDE 0.5 MG: 0.5 INHALANT RESPIRATORY (INHALATION) at 20:00

## 2023-01-01 RX ADMIN — ACETAMINOPHEN 1000 MG: 500 TABLET, FILM COATED ORAL at 17:36

## 2023-01-01 ASSESSMENT — ACTIVITIES OF DAILY LIVING (ADL)
ADLS_ACUITY_SCORE: 34
ADLS_ACUITY_SCORE: 33
DRESSING/BATHING_DIFFICULTY: NO
ADLS_ACUITY_SCORE: 35
ADLS_ACUITY_SCORE: 34
ADLS_ACUITY_SCORE: 36
ADLS_ACUITY_SCORE: 35
ADLS_ACUITY_SCORE: 34
ADLS_ACUITY_SCORE: 38
ADLS_ACUITY_SCORE: 38
ADLS_ACUITY_SCORE: 37
VISION_MANAGEMENT: GLASSES
ADLS_ACUITY_SCORE: 35
ADLS_ACUITY_SCORE: 38
ADLS_ACUITY_SCORE: 37
WALKING_OR_CLIMBING_STAIRS: AMBULATION DIFFICULTY, REQUIRES EQUIPMENT
WALKING_OR_CLIMBING_STAIRS_DIFFICULTY: YES
ADLS_ACUITY_SCORE: 33
ADLS_ACUITY_SCORE: 36
ADLS_ACUITY_SCORE: 35
ADLS_ACUITY_SCORE: 38
EATING/SWALLOWING: SWALLOWING SOLID FOOD
ADLS_ACUITY_SCORE: 38
DEPENDENT_IADLS:: TRANSPORTATION
ADLS_ACUITY_SCORE: 38
ADLS_ACUITY_SCORE: 34
ADLS_ACUITY_SCORE: 38
ADLS_ACUITY_SCORE: 34
ADLS_ACUITY_SCORE: 38
CHANGE_IN_FUNCTIONAL_STATUS_SINCE_ONSET_OF_CURRENT_ILLNESS/INJURY: NO
ADLS_ACUITY_SCORE: 38
ADLS_ACUITY_SCORE: 35
ADLS_ACUITY_SCORE: 38
ADLS_ACUITY_SCORE: 34
ADLS_ACUITY_SCORE: 35
ADLS_ACUITY_SCORE: 38
ADLS_ACUITY_SCORE: 35
ADLS_ACUITY_SCORE: 38
ADLS_ACUITY_SCORE: 33
ADLS_ACUITY_SCORE: 38
ADLS_ACUITY_SCORE: 38
ADLS_ACUITY_SCORE: 34
ADLS_ACUITY_SCORE: 38
ADLS_ACUITY_SCORE: 35
ADLS_ACUITY_SCORE: 37
ADLS_ACUITY_SCORE: 34
ADLS_ACUITY_SCORE: 38
ADLS_ACUITY_SCORE: 34
CONCENTRATING,_REMEMBERING_OR_MAKING_DECISIONS_DIFFICULTY: NO
WEAR_GLASSES_OR_BLIND: YES
ADLS_ACUITY_SCORE: 36
ADLS_ACUITY_SCORE: 37
ADLS_ACUITY_SCORE: 36
ADLS_ACUITY_SCORE: 38
DIFFICULTY_EATING/SWALLOWING: YES
ADLS_ACUITY_SCORE: 38
NUMBER_OF_TIMES_PATIENT_HAS_FALLEN_WITHIN_LAST_SIX_MONTHS: 1
ADLS_ACUITY_SCORE: 33
FALL_HISTORY_WITHIN_LAST_SIX_MONTHS: YES
ADLS_ACUITY_SCORE: 34
ADLS_ACUITY_SCORE: 35

## 2023-01-06 NOTE — PROGRESS NOTES
Clinic Care Coordination Contact    Care Coordination Clinician Chart Review    Situation: Patient chart reviewed by care coordinator.       Background: Care Coordination initial assessment and enrollment to Care Coordination was 5/16/2019. Patient centered goals were developed with participation from patient. JOSIAS CC handed patient off to CHW for continued outreach every 30 days.        Assessment: Per chart review, patient outreach completed by CC CHW on 12/21/2022. Patient is actively working to accomplish goal. Patient's goal remains appropriate and relevant at this time. Patient is due for updated Plan of Care. Annual assessment will be due 5/2023.      Care Plan: Health Concerns     Problem: Health Concerns     Goal: I will help my mom address her health concerns.     Start Date: 10/21/2022 Expected End Date: 1/20/2023    This Visit's Progress: 60% Recent Progress: 20%    Note:     Barriers: Age, Daughter has own health appointments  Strengths: Motivated  Patient expressed understanding of goal: yes  Action steps to achieve this goal:  1. I understand that CHW will email me necessary contact information to [email or web address]  2. I will help Pt schedule appointments to address (GI deleted) , Medication (completed) and memory loss concerns if Pt wishes.  3. I will continue working with Care Coordination.                     Plan/Recommendations: The patient will continue working with Care Coordination to achieve goal as above. CHW will involve JOSIAS CH as needed or if patient is ready to move to maintenance. JOSIAS CC will continue to monitor progress to goals and CHW outreaches every 6 weeks.     Plan of Care updated and sent to patient: Yes, via DAVID Oleary/LICJOSIAS  Social Work Care Coordinator  [facility], [place], and [place]  Phone: [phone]

## 2023-01-06 NOTE — LETTER
[facility] CARE COORDINATION  [address]  [facility] [address]    January 6, 2023    Marie Kline  [address]  [facility] [address]      Dear Skyler Salazar is an updated Patient Centered Plan of Care for your continued enrollment in Care Coordination. Please let us know if you have additional questions, concerns, or goals that we can assist with.    Sincerely,    DAVID Nuñez/LICJOSIAS  Social Work Care Coordinator  [facility], [place], and [place]  Phone: [phone]      [facility]  Patient Centered Plan of Care  About Me:        Patient Name:  Marie Kline    YOB: 1932  Age:         89 year old   [place] MRN:    [id number] Telephone Information:  Home Phone [phone]   Mobile [phone]       Address:  [address] [place] [address] [address]  [facility] [address] Email address:  [email or web address]      Emergency Contact(s)    Name Relationship Lgl Grd Work Phone Home Phone Mobile Phone   1. ALANA CHANDLER* Daughter No  [phone]   2. ROBIN KLINE Son No   [phone]   3. DEWAYNEEMILIA Grandchild No  [phone]   4. SAWYERMADAN Grandchild No  [phone]   5. CHAPARRITA ESCALONA* Relative No   [phone]           Primary language:  English     needed? No   [place] [employer] [facility]:  [phone] op. 1  Other communication barriers:None    Preferred Method of Communication:  Mail  Current living arrangement: I live in assisted living  Mobility Status/ Medical Equipment: Independent w/Device    Health Maintenance  Health Maintenance Reviewed: Due/Overdue   Health Maintenance Due   Topic Date Due     HF ACTION PLAN  Never done     MICROALBUMIN  Never done     ZOSTER IMMUNIZATION (1 of 2) Never done     LIPID  01/06/2022     FALL RISK ASSESSMENT  10/06/2022     PHQ-2 (once per calendar year)  01/01/2023     My Access Plan  Medical Emergency 911   Primary  Clinic Line [facility] - [phone]   24 Hour Appointment Line [phone] or  [phone]) (toll-free)   24 Hour Nurse Line [phone] (toll-free)   Preferred Urgent Care [facility] [place], [phone]     Preferred Hospital [facility]  [phone]     Preferred Pharmacy [id number]  [place], [place] - [address] [facility]     Behavioral Health Crisis Line The National Suicide Prevention Lifeline at 1-704.545.1761 or Text/Call 568     My Care Team Members  Patient Care Team       Relationship Specialty Notifications Start End    Be Dumont MD PCP - General Internal Medicine  3/22/22     Phone: [phone] Fax: [phone]         [address] [facility] [address]    Isabel Ivory MD MD Oncology  2/6/17     Phone: [phone] Fax: [phone]         [place] [facility]  [address] [address]    Aneta Thomas [facility] Lead Care Coordinator  - Clinical Admissions 5/16/19     Phone: [phone] Fax: [phone]        Marva Powell MD Assigned Pulmonology Provider   5/2/21     Phone: [phone] Fax: [phone]         [address] [facility] [address] [facility] [address]    Mera Mendez MA Community Health Worker   11/10/21     Be Dumont MD Assigned PCP   2/20/22     Phone: [phone] Fax: [phone]         [address] [place] [address]    Marshall Verde [facility]    8/31/22     Phone: [phone] Fax: [phone] [address] [facility] [address]    Suzanne Mathur RP  Pharmacist  9/13/22 7/3/23    Phone: [phone] Fax: [phone] [address] [facility] [address] [place] [address]    Yuliana Cast, [facility] Assigned MTM Pharmacist   9/28/22     Phone: [phone] Pager: [phone]         [address] [place] [address] [address] [facility] [address]    Kriss Licea, RN Clinic Care Coordinator   Admissions 12/19/22             My Care Plans  Self Management and Treatment Plan  Care Plan  Care Plan: Health Concerns     Problem: Health Concerns     Goal: I will help my mom address her health concerns.     Start Date: 10/21/2022 Expected End Date: 1/20/2023    This Visit's Progress: 60% Recent Progress: 20%    Note:     Barriers: Age, Daughter has own health appointments  Strengths: Motivated  Patient expressed understanding of goal: yes  Action steps to achieve this goal:  1. I understand that CHW will email me necessary contact information to [email or web address] Mail  2. I will help Pt schedule appointments to address (GI deleted) , Medication (completed) and memory loss concerns if Pt wishes.  3. I will continue working with Care Coordination.                          Action Plans on File:   COPD    Advance Care Plans/Directives Type:   POLST      My Medical and Care Information  Problem List   Patient Active Problem List   Diagnosis     Acute and chronic respiratory failure with hypercapnia (HCC)     Nonischemic cardiomyopathy (H)     Pneumonia     Acute myocardial infarction, initial episode of care (HCC)     CLL (chronic lymphocytic leukemia) (HCC)     CHF (congestive heart failure) (HCC)     Cardiomyopathy in other diseases classified elsewhere (HCC)     Hyperlipidemia with target LDL less than 130     Health Care Home     COPD exacerbation (H)     LESLY (generalized anxiety disorder)     Hypothyroidism     Back pain with radiation     DDD (degenerative disc disease), lumbar     Splenomegaly     Lumbar degenerative disc disease     Lumbar foraminal stenosis     Central spinal stenosis     Bladder cancer (H)     Sepsis (H)     Senile osteoporosis     CKD (chronic kidney disease) stage 3, GFR 30-59 ml/min (H)     Purpura senilis (H)     Xerosis cutis     Malignant neoplasm of urinary bladder, unspecified site (H)     Hypoxia     Femoral neck fracture (H)     S/P total hip arthroplasty     NSTEMI (non-ST elevated  myocardial infarction) (H)     Stress-induced cardiomyopathy     COPD with acute exacerbation (H)     Shoulder fracture     Mouth sores     Closed nondisplaced fracture of pelvis with routine healing, unspecified part of pelvis, subsequent encounter     Pelvic hematoma in female     Closed head injury, subsequent encounter     Multiple skin tears     Pneumonia of left lung due to infectious organism, unspecified part of lung     Chest pain     Coronary artery disease involving native heart with angina pectoris, unspecified vessel or lesion type (H)     Acquired hypogammaglobulinemia (H)     Chronic diastolic (congestive) heart failure (H)     Insomnia     Contusion of scalp, subsequent encounter     Essential (primary) hypertension     Fatigue     History of falling     Hypokalemia     Hyponatremia     Muscle weakness (generalized)     Other abnormalities of gait and mobility     Other injury of unspecified body region, subsequent encounter     Other symbolic dysfunctions     Weakness of left leg     Pulmonary nodules     Community acquired pneumonia, unspecified laterality     2019 novel coronavirus disease (COVID-19)     Acute on chronic respiratory failure with hypoxia and hypercapnia (H)     Unspecified severe protein-calorie malnutrition (H)        Current Medications and Allergies:  Current Outpatient Medications   Medication     ACE/ARB/ARNI NOT PRESCRIBED (INTENTIONAL)     acetaminophen (TYLENOL) 325 MG tablet     albuterol (PROAIR HFA/PROVENTIL HFA/VENTOLIN HFA) 108 (90 Base) MCG/ACT inhaler     bisacodyl (DULCOLAX) 10 MG suppository     budesonide (PULMICORT) 0.5 MG/2ML neb solution     escitalopram (LEXAPRO) 10 MG tablet     ferrous sulfate (IRON) 325 (65 FE) MG tablet     furosemide (LASIX) 20 MG tablet     Immune Globulin, Human, (OCTAGAM) 5 GM/100ML SOLN     levothyroxine (SYNTHROID/LEVOTHROID) 88 MCG tablet     loperamide (IMODIUM A-D) 2 MG tablet     loperamide (IMODIUM) 2 MG capsule     metoprolol  succinate ER (TOPROL XL) 25 MG 24 hr tablet     Multiple Vitamins-Minerals (MULTIVITAMIN ADULT) CHEW     Revefenacin 175 MCG/3ML SOLN     senna-docusate (SENOKOT-S/PERICOLACE) 8.6-50 MG tablet     traMADol (ULTRAM) 50 MG tablet     traZODone (DESYREL) 50 MG tablet     No current facility-administered medications for this visit.     Allergies   Allergen Reactions     Diagnostic X-Ray Materials Hives     CT DYE     Contrast Dye Hives     CT DYE     Prolia [Denosumab]      Osteonecrosis jaw       Rocephin [Ceftriaxone] Itching     Wound Dressing Adhesive      Care Coordination Start Date: 5/16/2019   Frequency of Care Coordination: monthly     Form Last Updated: 01/06/2023

## 2023-01-10 NOTE — PROGRESS NOTES
Patient called writer back to reschedule missed appointment with Dr. Powell. Writer routed call to scheduling team to assist with this.    Kriss Licea RN on 1/10/2023 at 10:17 AM

## 2023-01-10 NOTE — PROGRESS NOTES
Writer attempted to call patient to assist with rescheduling missed appointment with Dr. Powell. Patient had indicated that she was planning to call clinic to schedule this after the first of the year, but as of today, no appointment has been made. Writer will continue to monitor to ensure this appointment is scheduled.     Kriss Licea RN on 1/10/2023 at 9:43 AM

## 2023-01-25 PROBLEM — J44.9 CHRONIC OBSTRUCTIVE PULMONARY DISEASE, UNSPECIFIED COPD TYPE (H): Status: ACTIVE | Noted: 2023-01-01

## 2023-01-25 PROBLEM — J18.9 PNEUMONIA DUE TO INFECTIOUS ORGANISM, UNSPECIFIED LATERALITY, UNSPECIFIED PART OF LUNG: Status: ACTIVE | Noted: 2023-01-01

## 2023-01-25 NOTE — ED TRIAGE NOTES
A&O x4, ABCs intact. Pt presents with concern for fever and hypoxia. Pt's son states that patient went to her scheduled infusion today for leukemia and they refused her because they reported her having a fever and too low of oxygen saturation. Pt is afebrile in ED and oxygen saturations are fluctuating between 89% to 92% with 2L supplemental oxygen via NC.         Triage Assessment     Row Name 01/25/23 5234       Triage Assessment (Adult)    Airway WDL WDL       Respiratory WDL    Respiratory WDL WDL       Cardiac WDL    Cardiac WDL WDL

## 2023-01-25 NOTE — ED NOTES
"Pt put on call light and stated that she wants to go home. Pt appears anxious and slightly agitated. Writer explained that we are waiting for her to go to CT. Pt asked \"well how long does that take?\" Writer explained that the CT takes a short period of time, but the results can take up to an hour to receive. Pt requested that the door be open, writer explained that while the door can be open, it will continually beep due to being in a negative air flow room and would pt prefer to have the curtain open but the door closed. Pt stated \"well, I guess that is how it is going to be, I don't like this closed in feeling.\" Writer opened curtain and closed door.   "

## 2023-01-25 NOTE — ED PROVIDER NOTES
History     Chief Complaint:  Breathing Problem       HPI   Marie Kline is a 89 year old female with a history of leukemia, who presents to the ER after being found to be febrile and with increased shortness of breath and hypoxia at her infusion center today.  Patient notes that she feels close to her baseline and has a cough at baseline, and uses 2 L of nasal cannula at baseline as well.  It sounds like she was in the high 80s on her normal 2 L, and looked worse than normal and so they were sent here.  Patient denies any chest pain, does state that she has a cough that is productive of sputum, and is hopeful she states that she can simply go home after her blood work.  Specifically Nuys any chest pain, no fainting,    No abdominal pain.      Review of External Notes: Yes, past notes show COPD and CLL with a history of CAD.    Allergies:  Diagnostic X-Ray Materials  Contrast Dye  Prolia [Denosumab]  Rocephin [Ceftriaxone]  Wound Dressing Adhesive     Medications:    ACE/ARB/ARNI NOT PRESCRIBED (INTENTIONAL)  acetaminophen (TYLENOL) 325 MG tablet  albuterol (PROAIR HFA/PROVENTIL HFA/VENTOLIN HFA) 108 (90 Base) MCG/ACT inhaler  bisacodyl (DULCOLAX) 10 MG suppository  budesonide (PULMICORT) 0.5 MG/2ML neb solution  escitalopram (LEXAPRO) 10 MG tablet  ferrous sulfate (IRON) 325 (65 FE) MG tablet  furosemide (LASIX) 20 MG tablet  Immune Globulin, Human, (OCTAGAM) 5 GM/100ML SOLN  levothyroxine (SYNTHROID/LEVOTHROID) 88 MCG tablet  loperamide (IMODIUM A-D) 2 MG tablet  loperamide (IMODIUM) 2 MG capsule  metoprolol succinate ER (TOPROL XL) 25 MG 24 hr tablet  Multiple Vitamins-Minerals (MULTIVITAMIN ADULT) CHEW  Revefenacin 175 MCG/3ML SOLN  senna-docusate (SENOKOT-S/PERICOLACE) 8.6-50 MG tablet  traMADol (ULTRAM) 50 MG tablet  traZODone (DESYREL) 50 MG tablet        Past Medical History:    Past Medical History:   Diagnosis Date     Arthritis      Bladder cancer (H)      CLL (chronic lymphocytic leukemia) (H)       Cognitive impairment 08/2022     Congestive heart failure (H) 10/24/2014     COPD (chronic obstructive pulmonary disease) (H)      Dysphagia      Hypertension      Hypothyroid      Mumps      Pulmonary edema cardiac cause (H) 10/08/2014     Stress-induced cardiomyopathy 10/2018     Tricuspid valve disorders, 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       Past Surgical History:    Past Surgical History:   Procedure Laterality Date     BIOPSY LYMPH NODE INGUINAL Right 10/23/2018    Procedure: excsional biopsy right inguinal lymph node;  Surgeon: Reji Connors MD;  Location: [facility] OR     BLADDER SURGERY       CORONARY ANGIOGRAPHY ADULT ORDER  10/2014    minimal CAD     CV LEFT HEART CATH N/A 12/11/2018    Procedure: Left Heart Cath;  Surgeon: Sadiq Carrasco MD;  Location:  HEART CARDIAC CATH LAB     CYSTOSCOPY       CYSTOSCOPY, BIOPSY BLADDER, COMBINED N/A 2/9/2016    Procedure: COMBINED CYSTOSCOPY, BIOPSY BLADDER;  Surgeon: Kar Nuñez MD;  Location: [facility] OR     CYSTOSCOPY, TRANSURETHRAL RESECTION (TUR) TUMOR BLADDER, COMBINED N/A 2/9/2016    Procedure: COMBINED CYSTOSCOPY, TRANSURETHRAL RESECTION (TUR) TUMOR BLADDER;  Surgeon: Kar Nuñez MD;  Location: [facility] OR     ESOPHAGOSCOPY, GASTROSCOPY, DUODENOSCOPY (EGD), COMBINED N/A 6/22/2016    Procedure: COMBINED ESOPHAGOSCOPY, GASTROSCOPY, DUODENOSCOPY (EGD);  Surgeon: Freddie Diez MD;  Location:  GI     OPEN REDUCTION INTERNAL FIXATION HIP BIPOLAR Left 11/19/2018    Procedure: Left bipolar hemiarthroplasty;  Surgeon: Scar Reynolds MD;  Location: [facility] OR     OPEN REDUCTION INTERNAL FIXATION HIP UNIPOLAR Right 8/26/2022    Procedure: Cemented hemiarthroplasty, right hip.;  Surgeon: Adonis Ferguson MD;  Location: [facility] OR        Family History:    family history includes Cancer in her father; Cerebrovascular Disease in her mother.    Social History:   reports that she quit smoking about 26 years ago. Her smoking use included  cigarettes. She has never used smokeless tobacco. She reports that she does not drink alcohol and does not use drugs.  PCP: Be Dumont     Physical Exam     Patient Vitals for the past 24 hrs:   BP Temp Temp src Pulse Resp SpO2   01/25/23 1554 -- -- -- -- -- 99 %   01/25/23 1539 -- -- -- -- -- 90 %   01/25/23 1530 (!) 136/95 -- -- 68 16 97 %   01/25/23 1345 125/61 98.2  F (36.8  C) Oral 77 22 90 %        Physical Exam  Vitals: reviewed by me  General: Pt seen on [facility], pleasant, cooperative, and alert to conversation, thin, chronically ill-appearing.  Eyes: Tracking well, clear conjunctiva BL  ENT: MMM, midline trachea.   Lungs: Mild-moderate respiratory distress, tachypneic with mild accessory muscle use, on nasal cannula, speaking in full sentences.  CV: Rate as above  Abd: Soft, non tender, no guarding, no rebound. Non distended  MSK: no joint effusion.  No evidence of trauma  Skin: No rash  Neuro: Clear speech and no facial droop.  Psych: Not RIS, no e/o AH/VH      Emergency Department Course       Imaging:  CT Chest Pulmonary Embolism w Contrast   Final Result   IMPRESSION:      1.  No pulmonary embolism.      2.  Small areas of consolidation with scattered areas of mucous plugging within the left lower lobe, likely representing infection.      3.  Moderate upper lobe predominant emphysema.      4.  Mildly enlarged mediastinal lymph nodes and splenomegaly, likely related to the patient's known CLL.      XR Chest 2 Views   Final Result   IMPRESSION: Stable cardiomediastinal silhouette with likely persistent   mediastinal lymphadenopathy, most pronounced in the AP window. Stable   emphysematous changes and scarring without new airspace consolidation,   pleural effusion or pneumothorax. No acute bony abnormality.      ALEA QUISPE MD            SYSTEM ID:  [id number]           Laboratory:  Labs Ordered and Resulted from Time of ED Arrival to Time of ED Departure   COMPREHENSIVE METABOLIC  PANEL - Abnormal       Result Value    Sodium 137      Potassium 4.5      Chloride 96 (*)     Carbon Dioxide (CO2) 33 (*)     Anion Gap 8      Urea Nitrogen 16.8      Creatinine 0.79      Calcium 8.7      Glucose 91      Alkaline Phosphatase 93      AST 23      ALT 11      Protein Total 6.2 (*)     Albumin 3.8      Bilirubin Total 0.4      GFR Estimate 71     LACTIC ACID WHOLE BLOOD - Abnormal    Lactic Acid 0.5 (*)    TROPONIN T, HIGH SENSITIVITY - Abnormal    Troponin T, High Sensitivity 35 (*)    D DIMER QUANTITATIVE - Abnormal    D-Dimer Quantitative 1.18 (*)    CBC WITH PLATELETS AND DIFFERENTIAL - Abnormal    WBC Count 79.7 (*)     RBC Count 3.20 (*)     Hemoglobin 9.1 (*)     Hematocrit 30.8 (*)     MCV 96      MCH 28.4      MCHC 29.5 (*)     RDW 16.6 (*)     Platelet Count 69 (*)    DIFFERENTIAL - Abnormal    % Neutrophils 6      % Lymphocytes 89      % Monocytes 5      % Eosinophils 0      % Basophils 0      Absolute Neutrophils 4.8      Absolute Lymphocytes 70.9 (*)     Absolute Monocytes 4.0 (*)     Absolute Eosinophils 0.0      Absolute Basophils 0.0      RBC Morphology Confirmed RBC Indices      Platelet Assessment        Value: Automated Count Confirmed. Platelet morphology is normal.   INFLUENZA A/B & SARS-COV2 PCR MULTIPLEX - Normal    Influenza A PCR Negative      Influenza B PCR Negative      RSV PCR Negative      SARS CoV2 PCR Negative     ROUTINE UA WITH MICROSCOPIC REFLEX TO CULTURE   BLOOD CULTURE   BLOOD CULTURE            Emergency Department Course & Assessments:      Interventions:  Medications   piperacillin-tazobactam (ZOSYN) infusion 3.375 g (has no administration in time range)   azithromycin 500 mg (ZITHROMAX) in 0.9% NaCl 250 mL intermittent infusion 500 mg (has no administration in time range)   diphenhydrAMINE (BENADRYL) injection 12.5 mg (12.5 mg Intravenous Given 1/25/23 1606)   0.9% sodium chloride BOLUS (100 mLs Intravenous New Bag 1/25/23 1622)   iopamidol (ISOVUE-370) solution  500 mL (45 mLs Intravenous Given 1/25/23 1622)        Independent Interpretation (X-rays, CTs, rhythm strip):  Yes, 5:12 PM, x-ray reviewed, no pneumothorax.      Social Determinants of Health affecting care:  Stress/Adjustment Disorders including anxiety    Assessments:    Disposition:  The patient was admitted to the hospital under the care of Dr. Wilkes.     Impression & Plan      Medical Decision Making:  This is a very pleasant 80-year-old female presents emergency room with appears to be pneumonia that is developed over her chronic COPD, and in setting of being treated for CLL.  She is doing well on 3 L of nasal cannula here, and certainly does not need to be intubated.  She does require broad-spectrum antibiotics I do not think that she is stable to go home given her age and comorbidities.  Thankfully there is no evidence of pulmonary embolism, and I do think that she stable to be admitted here to the medical surg level of care.  Patient is speaking in full sentences, is receiving antibiotics, and will be admitted to the next available hospitalist.    Critical Care time:  was 30 minutes for this patient excluding procedures for hypoxia and respiratory failure.    Diagnosis:    ICD-10-CM    1. Pneumonia due to infectious organism, unspecified laterality, unspecified part of lung  J18.9       2. CLL (chronic lymphocytic leukemia) (H)  C91.10       3. Chronic obstructive pulmonary disease, unspecified COPD type (H)  J44.9                 1/25/2023   Sadiq Abdullahi*        Sadiq Abdullahi MD  01/25/23 1813

## 2023-01-26 NOTE — CONSULTS
Care Management Initial Consult    General Information  Assessment completed with: Patient, VM-chart review, Patient  Type of CM/SW Visit: Initial Assessment    Primary Care Provider verified and updated as needed:     Readmission within the last 30 days:        Reason for Consult: discharge planning  Advance Care Planning:            Communication Assessment  Patient's communication style: spoken language (English or Bilingual)    Hearing Difficulty or Deaf: no   Wear Glasses or Blind: yes    Cognitive  Cognitive/Neuro/Behavioral: WDL                      Living Environment:   People in home: alone     Current living Arrangements: independent living facility      Able to return to prior arrangements:         Family/Social Support:  Care provided by: self  Provides care for: no one  Marital Status:   Children          Description of Support System: Supportive, Involved    Support Assessment: Adequate family and caregiver support    Current Resources:   Patient receiving home care services:       Community Resources:    Equipment currently used at home: walker, rolling  Supplies currently used at home:      Employment/Financial:  Employment Status:          Financial Concerns:             Lifestyle & Psychosocial Needs:  Social Determinants of Health     Tobacco Use: Medium Risk     Smoking Tobacco Use: Former     Smokeless Tobacco Use: Never     Passive Exposure: Not on file   Alcohol Use: Not on file   Financial Resource Strain: Not on file   Food Insecurity: Not on file   Transportation Needs: Not on file   Physical Activity: Not on file   Stress: Not on file   Social Connections: Not on file   Intimate Partner Violence: Not on file   Depression: Not at risk     PHQ-2 Score: 0   Housing Stability: Not on file       Functional Status:  Prior to admission patient needed assistance:   Dependent ADLs:: Ambulation-walker  Dependent IADLs:: Transportation  Assesssment of Functional Status: Not at baseline with  ADL Functioning    Mental Health Status:          Chemical Dependency Status:                Values/Beliefs:  Spiritual, Cultural Beliefs, Methodist Practices, Values that affect care:                 Additional Information:    Pt admitted with pneumonia, noted to have unplanned readmission risk of 22%.      Met with pt briefly at bedside due to elevated risk score. Pt explains that she lives at the [facility] and does not receive any services from her [facility]. Pt explained that she may have homecare but does not remember. Asked pt if this writer could call her family in which she declined and explained that they are in [place] currently. Pt explained that she is on 2L of O2 at baseline and uses a walker at baseline.    Per chart review, pt had previously been open to [facility]. Spoke with [facility] who confirmed pt was discharged from their services in October. Pt was at [facility]CU from 8/11-8/18 and 8/30-9/20. Patient has since been home. SW available for discharge planning as needed.     DAVID Norman, [facility]  Inpatient Care Coordination  Ortho/Spine Unit  [phone]  Piper Bonner, [facility]

## 2023-01-26 NOTE — PROGRESS NOTES
"A/Ox4 and Ax1 with walker and gait belt, patient uses 4WW at baseline. Denies pain. VSS on 3L via NC, patient on 2L O2 at baseline. Lung sounds coarse throughout, diminished in bilateral bases. Congested, occasionally productive cough. Mepilex placed on sacrum for protection. Patient began to have anxiety around 0550, stated feeling \"trapped in a hole.\" VSS at this time. Plan to continue levofloxacin and supportive measures.    /75 (BP Location: Right arm, Patient Position: Semi-Brooks's, Cuff Size: Adult Regular)   Pulse 87   Temp 98.7  F (37.1  C) (Oral)   Resp 22   Wt 40.8 kg (90 lb)   LMP  (LMP Unknown)   SpO2 94%   BMI 18.18 kg/m      "

## 2023-01-26 NOTE — H&P
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    She reports a chronic cough which is junky and occasionally productive of white to yellow sputum.  She does not feel sob.  Denies any f/c/s, and was shocked to find out that she had a fever. She states that she's feeling pretty good.      Apparently sats did drop but rebounded to the low to mid 90's when her oxygen was turned up from 2 to 3 L.      In the ER she is afebrile at 98.2 and VSS.   CMP essentially normal, trop 35 and flat on recheck.   EKG to my read NSR with 1st d AVB without acute ST-t changes  CBC with WBC 80 (range is ), chronically anemic and thrombocytopenia  Covid/rsv/influenza negative.     CT PE   No pulmonary embolism. Has some small areas of consolidation with mucous plugging in the LLL and moderate BUL emphysema    I am admitted her for CAP of the LLL with mucous plugging and acute on chronic hypoxic respiratory failure        Problem List:   CAP and chronic mucous plugging in the setting of ES COPD  I think her biggest issue is that of mucous plugging, her infectious sx are not prominent.  She's quite junky on exam but she is not wheezy.    -check procal,  she really doesn't look or feel that sick  -levofloxacin every day and treat for 7 days, if procal low and no more fevers could consider very short course of levofloxacin (3-5 days)   -blood cultures have been obtained  -acapella device to help mobilize secretions  -consider mucinex but I'm going to hold off for the time being as I'd like to see how well the acapella device works     Acute on chronic hypoxic respiratory failure   Chronic COPD  Typically on 2L/NC continuously  Resume home inhalers when med rec complete    CLL with anemia/thrombocytopenia  WBC quite variable 's, hgb tends to run in 8-10 range and platelets also very variable 's  All within their normal range    Mild dysphagia  Eats a mechanical soft diet    Mild cognitive dysfunction   Monitor, I think overall low risk for delirium, hospitalized many times in the past without issue    Hx of takosubo CM which is now resolved   Most recent echo in 3/2021 with normal EF 60-65 %    Quite thin BMI 18        COVID 19  Negative  S/p 3 shot moderna series followed by pfizer booster 9/2022 (biv)    DVT Prophylaxis: Pneumatic Compression Devices  Code Status: DNR/I  Functional Status: lives in independent living at [facility], ambulates with a walker   Rees: not needed  Access:   PIV          Time spent 80 minutes reviewing epic including notes/labs/prior hx, current medications.  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    She reports a chronic cough which is junky and occasionally productive of white to yellow sputum.  She does not feel sob.  Denies any f/c/s, and was shocked to find out that she had a fever. She states that she's feeling pretty good.      Apparently sats did drop but rebounded to the low to mid 90's when her oxygen was turned up from 2 to 3 L.      In the ER she is afebrile at 98.2 and VSS.   CMP essentially normal, trop 35 and flat on recheck.   EKG to my read NSR with 1st d AVB without acute ST-t changes  CBC with WBC 80 (range is ), chronically anemic and thrombocytopenia  Covid/rsv/influenza negative.     CT PE   No pulmonary embolism. Has some small areas of consolidation with mucous plugging in the LLL and moderate BUL emphysema      The history is obtained in discussion with the ER provider Dr Watkins and the patient and her son with fair reliability    Epic and Care everywhere were extensively reviewed        Past Medical History:     Past Medical History:   Diagnosis Date     Arthritis     Generalized     Bladder cancer (H)      CLL (chronic lymphocytic leukemia) (H)      Cognitive impairment 08/2022 16/30 slums     Congestive heart failure (H) 10/24/2014    flash pulm edema associated w/Takotsubo     COPD (chronic obstructive pulmonary disease) (H)     chronic O2 2L/NC     Dysphagia     on DD3     Hypertension      Hypothyroid      Mumps      Pulmonary edema cardiac cause (H) 10/08/2014    associated w/Takotsubo ACS     Stress-induced cardiomyopathy 10/2018    cath with minimal dz,  EF 30-35 %improved to 60-65 % by echo 3/2021     Tricuspid valve disorders, 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    TR 2-3+ per echo             Past Surgical History:     Past Surgical  History:   Procedure Laterality Date     BIOPSY LYMPH NODE INGUINAL Right 10/23/2018    Procedure: excsional biopsy right inguinal lymph node;  Surgeon: Reji Connors MD;  Location: [facility] OR     BLADDER SURGERY       CORONARY ANGIOGRAPHY ADULT ORDER  10/2014    minimal CAD     CV LEFT HEART CATH N/A 2018    Procedure: Left Heart Cath;  Surgeon: Sadiq Carrasco MD;  Location:  HEART CARDIAC CATH LAB     CYSTOSCOPY       CYSTOSCOPY, BIOPSY BLADDER, COMBINED N/A 2016    Procedure: COMBINED CYSTOSCOPY, BIOPSY BLADDER;  Surgeon: Kar Nuñez MD;  Location: [facility] OR     CYSTOSCOPY, TRANSURETHRAL RESECTION (TUR) TUMOR BLADDER, COMBINED N/A 2016    Procedure: COMBINED CYSTOSCOPY, TRANSURETHRAL RESECTION (TUR) TUMOR BLADDER;  Surgeon: Kar Nuñez MD;  Location: [facility] OR     ESOPHAGOSCOPY, GASTROSCOPY, DUODENOSCOPY (EGD), COMBINED N/A 2016    Procedure: COMBINED ESOPHAGOSCOPY, GASTROSCOPY, DUODENOSCOPY (EGD);  Surgeon: Freddie Diez MD;  Location: [facility] GI     OPEN REDUCTION INTERNAL FIXATION HIP BIPOLAR Left 2018    Procedure: Left bipolar hemiarthroplasty;  Surgeon: Scar Reynolds MD;  Location: [facility] OR     OPEN REDUCTION INTERNAL FIXATION HIP UNIPOLAR Right 2022    Procedure: Cemented hemiarthroplasty, right hip.;  Surgeon: Adonis Ferguson MD;  Location: [facility] OR             Social History:     Social History     Tobacco Use     Smoking status: Former     Types: Cigarettes     Quit date: 2/3/1996     Years since quittin.9     Smokeless tobacco: Never   Substance Use Topics     Alcohol use: No     Alcohol/week: 0.0 standard drinks             Family History:     Family History   Problem Relation Age of Onset     Cerebrovascular Disease Mother      Cancer Father             Allergies:     Allergies   Allergen Reactions     Diagnostic X-Ray Materials Hives     CT DYE     Contrast Dye Hives     CT DYE     Prolia [Denosumab]      Osteonecrosis jaw        Rocephin [Ceftriaxone] Itching     Wound Dressing Adhesive              Medications:     Prior to Admission medications    Medication Sig Last Dose Taking? Auth Provider Long Term End Date   acetaminophen (TYLENOL) 325 MG tablet May take 2 tablets (650 mg) by mouth every 6 hours as needed for fever or pain.  Yes Be Dumont MD     albuterol (PROAIR HFA/PROVENTIL HFA/VENTOLIN HFA) 108 (90 Base) MCG/ACT inhaler Inhale 1-2 puffs into the lungs every 6 hours as needed  Yes Marva Powell MD Yes    budesonide (PULMICORT) 0.5 MG/2ML neb solution Take 2 mLs (0.5 mg) by nebulization 2 times daily  Yes Marva Powell MD Yes    escitalopram (LEXAPRO) 10 MG tablet Take 1 tablet (10 mg) by mouth daily 1/25/2023 Yes Be Dumont MD Yes    ferrous sulfate (IRON) 325 (65 FE) MG tablet Take 325 mg by mouth daily (with breakfast)  1/25/2023 Yes Reported, Patient     furosemide (LASIX) 20 MG tablet Take 20 mg by mouth daily 1/25/2023 Yes Unknown, Entered By History Yes    levothyroxine (SYNTHROID/LEVOTHROID) 100 MCG tablet Take 100 mcg by mouth daily 1/25/2023 Yes Unknown, Entered By History Yes    loperamide (IMODIUM) 2 MG capsule TAKE 1 CAPSULE (2 MG) BY MOUTH 3 TIMES DAILY AS NEEDED FOR DIARRHEA  Yes Be Dumont MD     metoprolol succinate ER (TOPROL XL) 25 MG 24 hr tablet Take 1 tablet (25 mg) by mouth daily 1/25/2023 Yes Be Dumont MD Yes    Multiple Vitamins-Minerals (MULTIVITAMIN ADULT) CHEW Take 2 chew tab by mouth daily 1/25/2023 Yes Reported, Patient     senna-docusate (SENOKOT-S/PERICOLACE) 8.6-50 MG tablet Take 1 tablet by mouth 2 times daily as needed for constipation  Yes Sussy Richards PA-C     traMADol (ULTRAM) 50 MG tablet Take 0.5-1 tablets (25-50 mg) by mouth every 6 hours as needed for moderate to severe pain (Take 0.5 tab (25 mg) for pain 6-8. Take 1 tab (50 mg) for pain 9-10.)  Yes Sussy Richards PA-C No    traZODone (DESYREL) 50 MG tablet TAKE  TWO TABLETS BY MOUTH DAILY AT BEDTIME AS NEEDED FOR SLEEP  Yes Be Dumont MD Yes    ACE/ARB/ARNI NOT PRESCRIBED (INTENTIONAL) Please choose reason not prescribed from choices below.   Nehal Freeman, CNP Yes    bisacodyl (DULCOLAX) 10 MG suppository Place 1 suppository (10 mg) rectally daily as needed for constipation   Sussy Richards PA-C     Immune Globulin, Human, (OCTAGAM) 5 GM/100ML SOLN into the vein every 6 weeks.    Hold this medication while in TCU-resume at discharge from TCU  Patient not taking: Reported on 1/25/2023 Not Taking  Abstract, Provider     Revefenacin 175 MCG/3ML SOLN Inhale 3 mLs (175 mcg) into the lungs daily  Patient not taking: Reported on 1/25/2023 Not Taking  Lokesh Slaughter MD                   Review of Systems:     A Comprehensive greater than 10 system review of systems was carried out.  Pertinent positives and negatives are noted above.  Otherwise negative for contributory information.           Physical Exam:   Blood pressure 115/75, pulse 66, temperature 98.2  F (36.8  C), temperature source Oral, resp. rate 16, weight 40.8 kg (90 lb), SpO2 99 %, not currently breastfeeding.  Exam:    General:  Pleasant nad looks stated age very thin  HEENT:  Head nc/at sclera clear PERRL O/P:  Mask in place  Neck is supple  Lungs: lungs very junky, junky cough, no wheeze, poor air movement  CV:  RRR no m/r/g no le edema  Abd:  S/nt/nd no r/g  Neuro:  Cn 2-12 grossly intact and fernández  Alert and oriented affect appropriate   Skin:  W/d no c/c               Data:     Results for orders placed or performed during the hospital encounter of 01/25/23   XR Chest 2 Views     Status: None    Narrative    XR CHEST 2 VIEWS   1/25/2023 3:22 PM     HISTORY: Shortness of breath on chemo + fever    COMPARISON: Chest radiograph 8/25/2022.      Impression    IMPRESSION: Stable cardiomediastinal silhouette with likely persistent  mediastinal lymphadenopathy, most pronounced in the AP window.  Stable  emphysematous changes and scarring without new airspace consolidation,  pleural effusion or pneumothorax. No acute bony abnormality.    ALEA QUISPE MD         SYSTEM ID:  [id number]   CT Chest Pulmonary Embolism w Contrast     Status: None    Narrative    EXAM: CT CHEST PULMONARY EMBOLISM W CONTRAST  LOCATION: [facility]  DATE/TIME: 1/25/2023 4:28 PM    INDICATION: Shortness of breath. Hypoxia. Fever.  COMPARISON: CT chest 10/19/2021.  TECHNIQUE: CT chest pulmonary angiogram during arterial phase injection of IV contrast. Multiplanar reformats and MIP reconstructions were performed. Dose reduction techniques were used.   CONTRAST: 45mL Isovue 370    FINDINGS:  ANGIOGRAM CHEST: Pulmonary arteries are normal caliber and negative for pulmonary emboli. Advanced atherosclerotic calcifications throughout the nonaneurysmal thoracic aorta; no evidence of acute aortic pathology. No CT evidence of right heart strain.    LUNGS AND PLEURA: Moderate upper lobe predominant emphysema. Small areas of consolidation within the superior medial and posterior left lower lobe. Mild bronchial wall thickening with scattered areas of mucous plugging within the left lower lobe. No   pleural effusions or pneumothorax. Tiny right lower lobe calcified granuloma. Chronic mild biapical scarring.    MEDIASTINUM/AXILLAE: Normal cardiac size. No pericardial effusion. Few mildly enlarged mediastinal lymph nodes, including a 1.4 cm subcarinal lymph node. No enlarged axillary node.    CORONARY ARTERY CALCIFICATION: Mild to moderate.    UPPER ABDOMEN: Scattered atherosclerotic calcifications. Spleen is partially imaged but likely enlarged.    MUSCULOSKELETAL: Dextroconvex scoliosis, with multilevel degenerative change of the spine. No acute bony abnormality.      Impression    IMPRESSION:    1.  No pulmonary embolism.    2.  Small areas of consolidation with scattered areas of mucous plugging within the left lower lobe,  likely representing infection.    3.  Moderate upper lobe predominant emphysema.    4.  Mildly enlarged mediastinal lymph nodes and splenomegaly, likely related to the patient's known CLL.   Comprehensive metabolic panel     Status: Abnormal   Result Value Ref Range    Sodium 137 136 - 145 mmol/L    Potassium 4.5 3.4 - 5.3 mmol/L    Chloride 96 (L) 98 - 107 mmol/L    Carbon Dioxide (CO2) 33 (H) 22 - 29 mmol/L    Anion Gap 8 7 - 15 mmol/L    Urea Nitrogen 16.8 8.0 - 23.0 mg/dL    Creatinine 0.79 0.51 - 0.95 mg/dL    Calcium 8.7 8.2 - 9.6 mg/dL    Glucose 91 70 - 99 mg/dL    Alkaline Phosphatase 93 35 - 104 U/L    AST 23 10 - 35 U/L    ALT 11 10 - 35 U/L    Protein Total 6.2 (L) 6.4 - 8.3 g/dL    Albumin 3.8 3.5 - 5.2 g/dL    Bilirubin Total 0.4 <=1.2 mg/dL    GFR Estimate 71 >60 mL/min/1.73m2   Lactic acid whole blood     Status: Abnormal   Result Value Ref Range    Lactic Acid 0.5 (L) 0.7 - 2.0 mmol/L   Troponin T, High Sensitivity     Status: Abnormal   Result Value Ref Range    Troponin T, High Sensitivity 35 (H) <=14 ng/L   Asymptomatic Influenza A/B & SARS-CoV2 (COVID-19) Virus PCR Multiplex Nasopharyngeal     Status: Normal    Specimen: Nasopharyngeal; Swab   Result Value Ref Range    Influenza A PCR Negative Negative    Influenza B PCR Negative Negative    RSV PCR Negative Negative    SARS CoV2 PCR Negative Negative    Narrative    Testing was performed using the Xpert Xpress CoV2/Flu/RSV Assay on the Cepheid GeneXpert Instrument. This test should be ordered for the detection of SARS-CoV-2 and influenza viruses in individuals who meet clinical and/or epidemiological criteria. Test performance is unknown in asymptomatic patients. This test is for in vitro diagnostic use under the FDA EUA for laboratories certified under CLIA to perform high or moderate complexity testing. This test has not been FDA cleared or approved. A negative result does not rule out the presence of PCR inhibitors in the specimen or target  RNA in concentration below the limit of detection for the assay. If only one viral target is positive but coinfection with multiple targets is suspected, the sample should be re-tested with another FDA cleared, approved, or authorized test, if coinfection would change clinical management. This test was validated by the [facility] [employer] [employer]. These laboratories are certified under the Clinical Laboratory Improvement Amendments of 1988 (CLIA-88) as qualified to perform high complexity laboratory testing.   UA with Microscopic reflex to Culture     Status: Abnormal    Specimen: Urine, Midstream   Result Value Ref Range    Color Urine Yellow Colorless, Straw, Light Yellow, Yellow    Appearance Urine Clear Clear    Glucose Urine Negative Negative mg/dL    Bilirubin Urine Negative Negative    Ketones Urine Negative Negative mg/dL    Specific Gravity Urine 1.015 1.003 - 1.035    Blood Urine Negative Negative    pH Urine 6.5 5.0 - 7.0    Protein Albumin Urine 30 (A) Negative mg/dL    Urobilinogen Urine Normal Normal, 2.0 mg/dL    Nitrite Urine Negative Negative    Leukocyte Esterase Urine Negative Negative    RBC Urine 1 <=2 /HPF    WBC Urine 1 <=5 /HPF    Squamous Epithelials Urine 1 <=1 /HPF    Narrative    Urine Culture not indicated   D dimer quantitative     Status: Abnormal   Result Value Ref Range    D-Dimer Quantitative 1.18 (H) 0.00 - 0.50 ug/mL FEU    Narrative    This D-dimer assay is intended for use in conjunction with a clinical pretest probability assessment model to exclude pulmonary embolism (PE) and deep venous thrombosis (DVT) in outpatients suspected of PE or DVT. The cut-off value is 0.50 ug/mL FEU.   CBC with platelets and differential     Status: Abnormal   Result Value Ref Range    WBC Count 79.7 (HH) 4.0 - 11.0 10e3/uL    RBC Count 3.20 (L) 3.80 - 5.20 10e6/uL    Hemoglobin 9.1 (L) 11.7 - 15.7 g/dL    Hematocrit 30.8 (L) 35.0 - 47.0 %    MCV 96 78 - 100 fL    MCH 28.4 26.5 - 33.0 pg    MCHC  29.5 (L) 31.5 - 36.5 g/dL    RDW 16.6 (H) 10.0 - 15.0 %    Platelet Count 69 (L) 150 - 450 10e3/uL   Manual Differential     Status: Abnormal   Result Value Ref Range    % Neutrophils 6 %    % Lymphocytes 89 %    % Monocytes 5 %    % Eosinophils 0 %    % Basophils 0 %    Absolute Neutrophils 4.8 1.6 - 8.3 10e3/uL    Absolute Lymphocytes 70.9 (H) 0.8 - 5.3 10e3/uL    Absolute Monocytes 4.0 (H) 0.0 - 1.3 10e3/uL    Absolute Eosinophils 0.0 0.0 - 0.7 10e3/uL    Absolute Basophils 0.0 0.0 - 0.2 10e3/uL    RBC Morphology Confirmed RBC Indices     Platelet Assessment  Automated Count Confirmed. Platelet morphology is normal.     Automated Count Confirmed. Platelet morphology is normal.   Troponin T, High Sensitivity     Status: Abnormal   Result Value Ref Range    Troponin T, High Sensitivity 35 (H) <=14 ng/L   EKG 12-lead, tracing only     Status: None (Preliminary result)   Result Value Ref Range    Systolic Blood Pressure  mmHg    Diastolic Blood Pressure  mmHg    Ventricular Rate 72 BPM    Atrial Rate 72 BPM    ND Interval 244 ms    QRS Duration 86 ms     ms    QTc 440 ms    P Axis 86 degrees    R AXIS 84 degrees    T Axis 81 degrees    Interpretation ECG       Sinus rhythm with 1st degree A-V block  Otherwise normal ECG  When compared with ECG of 25-AUG-2022 11:55,  Premature atrial complexes are no longer Present  T wave amplitude has increased in Inferior leads     CBC with platelets differential     Status: Abnormal    Narrative    The following orders were created for panel order CBC with platelets differential.  Procedure                               Abnormality         Status                     ---------                               -----------         ------                     CBC with platelets and d...[[id number]]  Abnormal            Final result               Manual Differential[[id number]]          Abnormal            Final result                 Please view results for these tests on the  individual orders.

## 2023-01-26 NOTE — ED NOTES
Patient up to bedside commode with minimal assistance, upon returning to bed patient became nauseated. Patient placed in a position of comfort and stated she was going to rest.

## 2023-01-26 NOTE — PHARMACY-ADMISSION MEDICATION HISTORY
Admission medication history interview status for this patient is complete. See [facility] admission navigator for allergy information, prior to admission medications and immunization status.     Medication history interview done, indicate source(s): Patient  Medication history resources (including written lists, pill bottles, clinic record):None  Pharmacy: -    Changes made to PTA medication list:  Added: -  Changed: levothyroxine  Reported as Not Taking: IVIG, revefenacin  Removed: -    Actions taken by pharmacist (provider contacted, etc):None     Additional medication history information:None    Medication reconciliation/reorder completed by provider prior to medication history?  no   (Y/N)       Medication Affordability:        For patients on insulin therapy:   Do you use sliding scale insulin based on blood sugars?   What is your pre-meal insulin coverage?    Do you typically eat three meals a day?   How many times do you check your blood glucose per day?   How many episodes of hypoglycemia do you typically have per month?   Do you have a Continuous Glucose Monitor (CGM)?      Prior to Admission medications    Medication Sig Last Dose Taking? Auth Provider Long Term End Date   acetaminophen (TYLENOL) 325 MG tablet May take 2 tablets (650 mg) by mouth every 6 hours as needed for fever or pain.  Yes Be Dumont MD     albuterol (PROAIR HFA/PROVENTIL HFA/VENTOLIN HFA) 108 (90 Base) MCG/ACT inhaler Inhale 1-2 puffs into the lungs every 6 hours as needed  Yes Marva Powell MD Yes    budesonide (PULMICORT) 0.5 MG/2ML neb solution Take 2 mLs (0.5 mg) by nebulization 2 times daily  Yes Marva Powell MD Yes    escitalopram (LEXAPRO) 10 MG tablet Take 1 tablet (10 mg) by mouth daily 1/25/2023 Yes Be Dumont MD Yes    ferrous sulfate (IRON) 325 (65 FE) MG tablet Take 325 mg by mouth daily (with breakfast)  1/25/2023 Yes Reported, Patient     furosemide (LASIX) 20 MG tablet Take 20 mg by  mouth daily 1/25/2023 Yes Unknown, Entered By History Yes    levothyroxine (SYNTHROID/LEVOTHROID) 100 MCG tablet Take 100 mcg by mouth daily 1/25/2023 Yes Unknown, Entered By History Yes    loperamide (IMODIUM) 2 MG capsule TAKE 1 CAPSULE (2 MG) BY MOUTH 3 TIMES DAILY AS NEEDED FOR DIARRHEA  Yes Be Dumont MD     metoprolol succinate ER (TOPROL XL) 25 MG 24 hr tablet Take 1 tablet (25 mg) by mouth daily 1/25/2023 Yes Be Dumont MD Yes    Multiple Vitamins-Minerals (MULTIVITAMIN ADULT) CHEW Take 2 chew tab by mouth daily 1/25/2023 Yes Reported, Patient     senna-docusate (SENOKOT-S/PERICOLACE) 8.6-50 MG tablet Take 1 tablet by mouth 2 times daily as needed for constipation  Yes Sussy Richards PA-C     traMADol (ULTRAM) 50 MG tablet Take 0.5-1 tablets (25-50 mg) by mouth every 6 hours as needed for moderate to severe pain (Take 0.5 tab (25 mg) for pain 6-8. Take 1 tab (50 mg) for pain 9-10.)  Yes Sussy Richards PA-C No    traZODone (DESYREL) 50 MG tablet TAKE TWO TABLETS BY MOUTH DAILY AT BEDTIME AS NEEDED FOR SLEEP  Yes Be Dumont MD Yes    ACE/ARB/ARNI NOT PRESCRIBED (INTENTIONAL) Please choose reason not prescribed from choices below.   Nehal Freeman, CNP Yes    bisacodyl (DULCOLAX) 10 MG suppository Place 1 suppository (10 mg) rectally daily as needed for constipation   Sussy Richards PA-C     Immune Globulin, Human, (OCTAGAM) 5 GM/100ML SOLN into the vein every 6 weeks.    Hold this medication while in TCU-resume at discharge from TCU  Patient not taking: Reported on 1/25/2023 Not Taking  Abstract, Provider     Revefenacin 175 MCG/3ML SOLN Inhale 3 mLs (175 mcg) into the lungs daily  Patient not taking: Reported on 1/25/2023 Not Taking  Lokesh Slaughter MD

## 2023-01-26 NOTE — PLAN OF CARE
End of Shift Summary.  For vital signs and complete assessments, please see documentation flowsheets.     Pertinent assessments: Oriented x4. LS diminished/rhonci. Productive cough. On 2L O2. PIC intact. VSS.   Major Shift Events: Admitted to floor  Plan (Upcoming Events): TBD      Bedside Shift Report Completed   Bedside Safety Check Completed

## 2023-01-26 NOTE — ED NOTES
[facility]  ED Nurse Handoff Report    Marie Kline is a 89 year old female   ED Chief complaint: Breathing Problem  . ED Diagnosis:   Final diagnoses:   Pneumonia due to infectious organism, unspecified laterality, unspecified part of lung   CLL (chronic lymphocytic leukemia) (H)   Chronic obstructive pulmonary disease, unspecified COPD type (H)     Allergies:   Allergies   Allergen Reactions     Diagnostic X-Ray Materials Hives     CT DYE     Contrast Dye Hives     CT DYE     Prolia [Denosumab]      Osteonecrosis jaw       Rocephin [Ceftriaxone] Itching     Wound Dressing Adhesive        Code Status: Full Code  Activity level - Baseline/Home:  Assist X 1. Activity Level - Current:   Assist X 1. Lift room needed: No. Bariatric: No   Needed: No   Isolation: No. Infection: Not Applicable.     Vital Signs:   Vitals:    01/25/23 1715 01/25/23 1754 01/25/23 1800 01/25/23 1815   BP: (!) 132/111  120/63 115/75   Pulse: (!) 187  73 66   Resp:    16   Temp:       TempSrc:       SpO2:   99% 98%   Weight:  40.8 kg (90 lb)         Cardiac Rhythm:  ,      Pain level:    Patient confused: No. Patient Falls Risk: Yes.   Elimination Status: Has voided   Patient Report - Initial Complaint: A&O x4, ABCs intact. Pt presents with concern for fever and hypoxia. Pt's son states that patient went to her scheduled infusion today for leukemia and they refused her because they reported her having a fever and too low of oxygen saturation. Pt is afebrile in ED and oxygen saturations are fluctuating between 89% to 92% with 2L supplemental oxygen via NC.  Focused Assessment:     Neuro Cognitive  Neuro Cognitive  Neuro Cognitive (Adult) Cognitive/Neuro/Behavioral WDL: WDL   [place] Coma Scale Best Eye Response: 4-->(E4) spontaneous  Best Motor Response: 6-->(M6) obeys commands  Best Verbal Response: 5-->(V5) oriented  [place] Coma Scale Score: 15      Details:   Neuro Cognitive (Adult) Cognitive/Neuro/Behavioral WDL: WDL    [place] Coma Scale Best Eye Response: 4-->(E4) spontaneous  Best Motor Response: 6-->(M6) obeys commands  Best Verbal Response: 5-->(V5) oriented  [place] Coma Scale Score: 15              Respiratory  Respiratory  Respiratory WDL Respiratory WDL: .WDL except  (increased oxygen needs)        Tests Performed: Labs and imaging. Abnormal Results:   Labs Ordered and Resulted from Time of ED Arrival to Time of ED Departure   COMPREHENSIVE METABOLIC PANEL - Abnormal       Result Value    Sodium 137      Potassium 4.5      Chloride 96 (*)     Carbon Dioxide (CO2) 33 (*)     Anion Gap 8      Urea Nitrogen 16.8      Creatinine 0.79      Calcium 8.7      Glucose 91      Alkaline Phosphatase 93      AST 23      ALT 11      Protein Total 6.2 (*)     Albumin 3.8      Bilirubin Total 0.4      GFR Estimate 71     LACTIC ACID WHOLE BLOOD - Abnormal    Lactic Acid 0.5 (*)    TROPONIN T, HIGH SENSITIVITY - Abnormal    Troponin T, High Sensitivity 35 (*)    D DIMER QUANTITATIVE - Abnormal    D-Dimer Quantitative 1.18 (*)    CBC WITH PLATELETS AND DIFFERENTIAL - Abnormal    WBC Count 79.7 (*)     RBC Count 3.20 (*)     Hemoglobin 9.1 (*)     Hematocrit 30.8 (*)     MCV 96      MCH 28.4      MCHC 29.5 (*)     RDW 16.6 (*)     Platelet Count 69 (*)    DIFFERENTIAL - Abnormal    % Neutrophils 6      % Lymphocytes 89      % Monocytes 5      % Eosinophils 0      % Basophils 0      Absolute Neutrophils 4.8      Absolute Lymphocytes 70.9 (*)     Absolute Monocytes 4.0 (*)     Absolute Eosinophils 0.0      Absolute Basophils 0.0      RBC Morphology Confirmed RBC Indices      Platelet Assessment        Value: Automated Count Confirmed. Platelet morphology is normal.   INFLUENZA A/B & SARS-COV2 PCR MULTIPLEX - Normal    Influenza A PCR Negative      Influenza B PCR Negative      RSV PCR Negative      SARS CoV2 PCR Negative     ROUTINE UA WITH MICROSCOPIC REFLEX TO CULTURE   TROPONIN T, HIGH SENSITIVITY   BLOOD CULTURE   BLOOD CULTURE      CT  Chest Pulmonary Embolism w Contrast   Final Result   IMPRESSION:      1.  No pulmonary embolism.      2.  Small areas of consolidation with scattered areas of mucous plugging within the left lower lobe, likely representing infection.      3.  Moderate upper lobe predominant emphysema.      4.  Mildly enlarged mediastinal lymph nodes and splenomegaly, likely related to the patient's known CLL.      XR Chest 2 Views   Final Result   IMPRESSION: Stable cardiomediastinal silhouette with likely persistent   mediastinal lymphadenopathy, most pronounced in the AP window. Stable   emphysematous changes and scarring without new airspace consolidation,   pleural effusion or pneumothorax. No acute bony abnormality.      ALEA QUISPE MD            SYSTEM ID:  [id number]       .   Treatments provided: See MAR  Family Comments: None at bedside  OBS brochure/video discussed/provided to patient:  No  ED Medications:   Medications   piperacillin-tazobactam (ZOSYN) infusion 3.375 g (3.375 g Intravenous New Bag 1/25/23 1823)   azithromycin 500 mg (ZITHROMAX) in 0.9% NaCl 250 mL intermittent infusion 500 mg (has no administration in time range)   diphenhydrAMINE (BENADRYL) injection 12.5 mg (12.5 mg Intravenous Given 1/25/23 1606)   0.9% sodium chloride BOLUS (0 mLs Intravenous Stopped 1/25/23 1823)   iopamidol (ISOVUE-370) solution 500 mL (45 mLs Intravenous Given 1/25/23 1622)     Drips infusing:  No  For the majority of the shift, the patient's behavior Green. Interventions performed were N/A.    Sepsis treatment initiated: No     Patient tested for COVID 19 prior to admission: YES    ED Nurse Name/Phone Number: Geneva Parada RN,   6:42 PM    RECEIVING UNIT ED HANDOFF REVIEW    Above ED Nurse Handoff Report was reviewed: Yes  Reviewed by: Sanjana Lin RN on January 26, 2023 at 7:39 AM

## 2023-01-27 NOTE — PROGRESS NOTES
[facility]    Hospitalist Progress Note    Date of Service (when I saw the patient): 01/27/2023    Assessment & Plan      Marie Kline is a pleasant 89 year old woman with a history of CLL with chronic leukocytosis ( range) with associated anemia/thrombocytopenia; hx of Takotsubo CM with EF = 30% and cath with minimal CAD in 2018 (now resolved, with most recent ECHO in 3/2021 showing EF = 60-65 % with evidence of diastolic dysfunction); COPD and chronic hypoxic respiratory failure on 2L NC, HTN, prior bladder cancer s/p TURBT, hypothyroidism, mild dysphagia on DD3 diet, and cognitive impairment, who was sent in from her infusion center with worsening hypoxia and a fever and was admitted on 1/25/2023 for treatment of Left lower lobe pneumonia.     She reported having a chronic cough which is junky and occasionally productive of white to yellow sputum; denied feeling SOB on admission.     Apparently sats did drop but rebounded to the low to mid 90's when her oxygen was turned up from 2 to 3 L.       CT PE: no pulmonary embolism. Has some small areas of consolidation with mucous plugging in the LLL and moderate BUL emphysema.     CAP  Chronic mucous plugging in the setting of advanced COPD   - 1/25 Procal mildly elevated  - levofloxacin continued; initial plans to treat for 7 days days ->   - BCs negative   - continue acapella QID to help mobilize secretions  - consider mucinex if no further improvements overnight     Acute on chronic hypoxic respiratory failure   Chronic COPD  Physical deconditioning; multifactorial  - continue 2L/NC  - continue PTA inhalers  - PT eval. 1/28    Hyperkalemia; mild.     CLL with anemia/thrombocytopenia  WBC quite variable 's, hgb tends to run in 8-10 range and platelets also very variable 's  - continue current monitoring     Mild dysphagia; stable.  No new issues.  - continue mechanical soft diet     Mild cognitive dysfunction   - monitor; low  risk for delirium, hospitalized many times in the past without issue     Hx of Takotsubo CM --> now resolved   - most recent echo in 3/2021 with normal EF 60-65 %     Cachexia/quite thin; BMI = 18  Weight loss  - RD eval. 1/27     COVID 19  Negative  - S/p 3 shot moderna series followed by Pfizer booster 9/2022 (biv)     DVT Prophylaxis: Pneumatic Compression Devices  Code Status: DNR/DNI  Functional Status: lives in independent living at [facility], ambulates with a walker   Rees: not needed  Access: PIV    Disposition: Expected discharge in 1-2 days pending clinical status and TCU placement .    MINNIE HANSEN MD  [facility] Hospitalist  01/27/2023     Interval History      No acute events overnight  O2 needs stable  No CP  No fevers, cough unchanged  No new complaints, endorses generalized weakness    Data reviewed today: I reviewed all new labs and imaging results over the last 24 hours.     Physical Exam   Temp: 98.2  F (36.8  C) Temp src: Oral BP: 97/47 Pulse: 71   Resp: 18 SpO2: 94 % O2 Device: Nasal cannula Oxygen Delivery: 2 LPM  Vitals:    01/25/23 1754 01/27/23 0609   Weight: 40.8 kg (90 lb) 38.4 kg (84 lb 9.6 oz)     Vital Signs with Ranges  Temp:  [97.6  F (36.4  C)-98.6  F (37  C)] 98.2  F (36.8  C)  Pulse:  [71-85] 71  Resp:  [16-18] 18  BP: ()/(47-63) 97/47  SpO2:  [91 %-96 %] 94 %  I/O last 3 completed shifts:  In: 120 [P.O.:120]  Out: -     Constitutional: very thin; awake, no apparent distress; lying in bed  HEENT: sclerae clear; MM's moist  Respiratory: fair a/e bilaterally; diminished breath sounds throughout; no wheezing; diffuse expiratory rhonchi  Cardiovascular: Regular rate and rhythm, S1, S2 noted; no m/r/g  GI: abdomen flat, + bowel sounds; soft, non-tender, non-distended  Skin/Integumen: no rashes, no cyanosis, no jaundice  Musculoskeletal: no edema  Neurologic: follows directions well; no focal deficits      Medications       budesonide  0.5 mg Nebulization BID      escitalopram  10 mg Oral Daily     furosemide  20 mg Oral Daily     ipratropium - albuterol 0.5 mg/2.5 mg/3 mL  3 mL Nebulization 4x daily     levofloxacin  250 mg Intravenous Q24H     levothyroxine  100 mcg Oral Daily     metoprolol succinate ER  25 mg Oral Daily     [START ON 1/28/2023] multivitamin w/minerals  1 tablet Oral Daily     sodium chloride (PF)  3 mL Intracatheter Q8H       Data   Recent Labs   Lab 01/27/23  0659 01/26/23  2123 01/26/23  0805 01/25/23  1823 01/25/23  1509   WBC  --   --  114.1*  --  79.7*   HGB  --   --  9.9*  --  9.1*   MCV  --   --  96  --  96   PLT  --   --  91*  --  69*     --  136  --  137   POTASSIUM 4.0 5.4* 5.6*  --  4.5   CHLORIDE 98  --  98  --  96*   CO2 30*  --  30*  --  33*   BUN 14.1  --  14.8  --  16.8   CR 0.83  --  0.75 0.78 0.79   ANIONGAP 9  --  8  --  8   CARLOS 8.5  --  8.7  --  8.7   GLC 97  --  114*  --  91   ALBUMIN  --   --   --   --  3.8   PROTTOTAL  --   --   --   --  6.2*   BILITOTAL  --   --   --   --  0.4   ALKPHOS  --   --   --   --  93   ALT  --   --   --   --  11   AST  --   --   --   --  23

## 2023-01-27 NOTE — PROGRESS NOTES
01/27/23 0800   Appointment Info   Signing Clinician's Name / Credentials (PT) Sera Campos DPT   Living Environment   People in Home alone   Current Living Arrangements independent living facility   Home Accessibility no concerns   Transportation Anticipated family or friend will provide   Living Environment Comments Pt lives in ILF at baseline. Was at TCU in August of this year.   Self-Care   Usual Activity Tolerance good   Current Activity Tolerance fair   Equipment Currently Used at Home walker, rolling;grab bar, toilet;grab bar, tub/shower;shower chair   Fall history within last six months yes   Number of times patient has fallen within last six months 1   Activity/Exercise/Self-Care Comment Pt reports Mansi with use of 4WW, assist for bathing, otherwise IND for ADLs. Pt reports she can have assist for meals, but prefers to cook on her own.   General Information   Onset of Illness/Injury or Date of Surgery 01/25/23   Referring Physician Mayur Cristobal MD   Pertinent History of Current Problem (include personal factors and/or comorbidities that impact the POC) Per chart: Marie Kline is a pleasant 89 year old woman with a history of CLL with chronic leukocytosis ( range) with associated anemia/thrombocytopenia; hx of Takotsubo CM with EF = 30% and cath with minimal CAD in 2018 (now resolved, with most recent ECHO in 3/2021 showing EF = 60-65 % with evidence of diastolic dysfunction); COPD and chronic hypoxic respiratory failure on 2L NC, HTN, prior bladder cancer s/p TURBT, hypothyroidism, mild dysphagia on DD3 diet, and cognitive impairment, who was sent in from her infusion center with worsening hypoxia and a fever and was admitted on 1/25/2023 for treatment of Left lower lobe pneumonia.   Existing Precautions/Restrictions fall;oxygen therapy device and L/min  (2 L O2 via NC at rest - uses chronically)   General Observations Pt supine upon PT arrival, agreeable  to session   Cognition    Affect/Mental Status (Cognition) WFL   Orientation Status (Cognition) oriented x 3   Follows Commands (Cognition) WFL   Pain Assessment   Patient Currently in Pain No   Integumentary/Edema   Integumentary/Edema other (describe)   Integumentary/Edema Comments defer to nursing for full evaluation   Posture    Posture Forward head position;Protracted shoulders   Range of Motion (ROM)   Range of Motion ROM is WFL   Strength (Manual Muscle Testing)   Strength (Manual Muscle Testing) Able to perform R SLR;Able to perform L SLR;Deficits observed during functional mobility   Strength Comments Decreased functional endurance noted during upright mobility   Bed Mobility   Bed Mobility supine-sit   Comment, (Bed Mobility) SBA   Transfers   Transfers sit-stand transfer   Comment, (Transfers) CGA at 4WW   Gait/Stairs (Locomotion)   [place] Level (Gait) contact guard   Assistive Device (Gait) walker, 4-wheeled   Distance in Feet 5 ft eval   Pattern (Gait) step-through   Deviations/Abnormal Patterns (Gait) gait speed decreased;stride length decreased   Balance   Balance other (describe)   Balance Comments SBA for sitting balance EOB, benefts from use of AD for upright mobility   Sensory Examination   Sensory Perception Comments BLE light touch intact, pt notes no deficits   Coordination   Coordination no deficits were identified   Muscle Tone   Muscle Tone no deficits were identified   Clinical Impression   Criteria for Skilled Therapeutic Intervention Yes, treatment indicated   PT Diagnosis (PT) impaired functional mobility   Influenced by the following impairments impaired endurance, increased O2 demands, balance impairment   Functional limitations due to impairments impaired IND with bed mobility, transfers, ambulation   Clinical Presentation (PT Evaluation Complexity) Stable/Uncomplicated   Clinical Presentation Rationale Based on current presentation, PMH, prior living situation   Clinical Decision Making (Complexity)  low complexity   Planned Therapy Interventions (PT) balance training;bed mobility training;gait training;home exercise program;patient/family education;ROM (range of motion);strengthening;transfer training;progressive activity/exercise;home program guidelines   Anticipated Equipment Needs at Discharge (PT) walker, rolling  (FWW (already owns 4WW))   Risk & Benefits of therapy have been explained evaluation/treatment results reviewed;care plan/treatment goals reviewed;risks/benefits reviewed;current/potential barriers reviewed;participants voiced agreement with care plan;participants included;patient   PT Total Evaluation Time   PT Eval, Low Complexity Minutes (13731) 12   Physical Therapy Goals   PT Frequency Daily   PT Predicted Duration/Target Date for Goal Attainment 02/03/23   PT Goals Bed Mobility;Transfers;Gait;Aerobic Activity   PT: Bed Mobility Modified independent;Supine to/from sit   PT: Transfers Modified independent;Sit to/from stand;Bed to/from chair;Assistive device   PT: Gait Modified independent;Assistive device;Rolling walker;150 feet   PT: Perform aerobic activity with stable cardiovascular response continuous activity;10 minutes  (on 2 L O2, with no significant drop in O2 sats)   Interventions   Interventions Quick Adds Gait Training;Therapeutic Activity;Therapeutic Procedure   Therapeutic Activity   Therapeutic Activities: dynamic activities to improve functional performance Minutes (47616) 12   Symptoms Noted During/After Treatment Shortness of breath;Significant change in vital signs   Treatment Detail/Skilled Intervention Following eval, pt wanting to get up to BR. Toilet transfer completed with CGA and use of grab bar. Pt able to manage clothing and pericares IND. Able to demo safe static balance with no UE support to brush hair, wash face and hands, with SBA. Following ~5 min up in BR on 2 L O2, pt with O2 sats ~85%. Seated rest break, cues for PLB. Pt taking NC out of nose at times, edu on  importance of proper wearing. Pt bumped up to 3 L O2, as she was unable to recover greater than 90% on 2 L in 1 min. Recovers into low 90s following seated rest. Further STS at 4WW with CGA, requires reminders for brake use, safe hand placement. Extended time for positioning in recliner to pt's preference. Remained in recliner on 3 L O2, alarm armed, needs in reach, RN aware.   Gait Training   Gait Training Minutes (25813) 8   Symptoms Noted During/After Treatment (Gait Training) shortness of breath   Treatment Detail/Skilled Intervention Following eval, further gait training at 4WW with CGA-SBA. Pt initially on 2 L O2, bumped up to 3 L for later bouts, see TA section. Pt often leaving walker behind, requires reminders for safe walker use. Pt reporting headache following second bout, declining further mobility. Total 15 + 20 + 30 ft.   PT Discharge Planning   PT Plan progress gait distance/activity tolerance, monitor O2 sats (uses 2 L at baseline0   PT Discharge Recommendation (DC Rec) Transitional Care Facility;home with assist;home with home care physical therapy   PT Rationale for DC Rec Pt currently below reported Mansi baseline, most limited by decreased activity tolerance and increased O2 demands. Pt uses 4WW and 2 L O2 at baseline, unable to ambulate functional distances on 2 L this date without significant change in O2 sats. Pt would benefit from further skilled therapy in TCU setting to maximize safety and activity tolerance before returning to ILF. If not TCU, would recommend continued use of walker, supplemental O2, HH PT to address continued deficits, and assist for longer distances of ambulation.   PT Brief overview of current status Ax1 FWW, 2-3 L O2   Total Session Time   Timed Code Treatment Minutes 20   Total Session Time (sum of timed and untimed services) 32

## 2023-01-27 NOTE — PROGRESS NOTES
[facility]    Hospitalist Progress Note    Date of Service (when I saw the patient): 01/26/2023    Assessment & Plan   Marie Kline is a pleasant 89 year old woman with a history of CLL with chronic leukocytosis ( range) with associated anemia/thrombocytopenia; hx of Takotsubo CM with EF = 30% and cath with minimal CAD in 2018 (now resolved, with most recent ECHO in 3/2021 showing EF = 60-65 % with evidence of diastolic dysfunction); COPD and chronic hypoxic respiratory failure on 2L NC, HTN, prior bladder cancer s/p TURBT, hypothyroidism, mild dysphagia on DD3 diet, and cognitive impairment, who was sent in from her infusion center with worsening hypoxia and a fever and was admitted on 1/25/2023 for treatment of Left lower lobe pneumonia.     She reported having a chronic cough which is junky and occasionally productive of white to yellow sputum; denied feeling SOB on admission.     Apparently sats did drop but rebounded to the low to mid 90's when her oxygen was turned up from 2 to 3 L.       CT PE: no pulmonary embolism. Has some small areas of consolidation with mucous plugging in the LLL and moderate BUL emphysema      Current problems include:    CAP  Chronic mucous plugging in the setting of advanced COPD   - 1/25 Procal mildly elevated  - levofloxacin every day; initial plans to treat for 7 days -> could also consider shorter course of levofloxacin (3-5 days)   - blood cultures pending  - continue acapella QID to help mobilize secretions  - consider mucinex if no further improvements overnight     Acute on chronic hypoxic respiratory failure   Chronic COPD  Physical deconditioning; multifactorial  - continue 2L/NC  - continue PTA inhalers  - PT eval. 1/28    Hyperkalemia; mild.  - recheck K today and 1/27 a.m.     CLL with anemia/thrombocytopenia  WBC quite variable 's, hgb tends to run in 8-10 range and platelets also very variable 's  - continue current  monitoring     Mild dysphagia; stable.  No new issues.  - continue mechanical soft diet     Mild cognitive dysfunction   - monitor; low risk for delirium, hospitalized many times in the past without issue     Hx of Takotsubo CM --> now resolved   - most recent echo in 3/2021 with normal EF 60-65 %     Cachexia/quite thin; BMI = 18  Weight loss  - RD eval. 1/27     COVID 19  Negative  - S/p 3 shot moderna series followed by Pfizer booster 9/2022 (biv)     DVT Prophylaxis: Pneumatic Compression Devices  Code Status: DNR/DNI  Functional Status: lives in independent living at [facility], ambulates with a walker   Rees: not needed  Access:   PIV    Disposition: Expected discharge in 1-2 days.      MOISE Cristobal MD, [facility]  Text Page (7am - 6pm)    Interval History   No new issues today; Marie says she is feeling much better - is still coughing intermittently, but phlegm is clear, and she thinks she is able to clear it more easily today.  Appetite good.  No f/c; + chest tightness, but does not think this is new.  Has lost weight recently; unclear how much/duration.      Data reviewed today: I reviewed all new labs and imaging results over the last 24 hours.     Physical Exam   Temp: 97.6  F (36.4  C) Temp src: Oral BP: 122/58 Pulse: 78   Resp: 18 SpO2: 95 % O2 Device: Nasal cannula Oxygen Delivery: 2 LPM  Vitals:    01/25/23 1754   Weight: 40.8 kg (90 lb)     Vital Signs with Ranges  Temp:  [97.6  F (36.4  C)-98.8  F (37.1  C)] 97.6  F (36.4  C)  Pulse:  [78-88] 78  Resp:  [18-22] 18  BP: (118-136)/(50-75) 122/58  SpO2:  [91 %-98 %] 95 %  I/O last 3 completed shifts:  In: 120 [P.O.:120]  Out: -     Constitutional: very thin; awake, no apparent distress; lying in bed  HEENT: sclerae clear; MM's moist  Respiratory: fair a/e bilaterally; diminished breath sounds throughout; no wheezing; diffuse expiratory rhonchi  Cardiovascular: Regular rate and rhythm, S1, S2 noted; no m/r/g  GI:  abdomen flat, + bowel sounds; soft, non-tender, non-distended  Skin/Integumen: no rashes, no cyanosis, no jaundice  Musculoskeletal: no edema  Neurologic: follows directions well; no focal deficits      Medications       budesonide  0.5 mg Nebulization BID     escitalopram  10 mg Oral Daily     furosemide  20 mg Oral Daily     ipratropium - albuterol 0.5 mg/2.5 mg/3 mL  3 mL Nebulization 4x daily     levofloxacin  250 mg Intravenous Q24H     levothyroxine  100 mcg Oral Daily     metoprolol succinate ER  25 mg Oral Daily     sodium chloride (PF)  3 mL Intracatheter Q8H       Data   Recent Labs   Lab 01/26/23  0805 01/25/23  1823 01/25/23  1509   .1*  --  79.7*   HGB 9.9*  --  9.1*   MCV 96  --  96   PLT 91*  --  69*     --  137   POTASSIUM 5.6*  --  4.5   CHLORIDE 98  --  96*   CO2 30*  --  33*   BUN 14.8  --  16.8   CR 0.75 0.78 0.79   ANIONGAP 8  --  8   CARLOS 8.7  --  8.7   *  --  91   ALBUMIN  --   --  3.8   PROTTOTAL  --   --  6.2*   BILITOTAL  --   --  0.4   ALKPHOS  --   --  93   ALT  --   --  11   AST  --   --  23

## 2023-01-27 NOTE — PLAN OF CARE
Goal Outcome Evaluation:           Overall Patient Progress: no changeOverall Patient Progress: no change     Blood pressure 122/58, pulse 78, temperature 97.6  F (36.4  C), temperature source Oral, resp. rate 18, weight 40.8 kg (90 lb), SpO2 95 %, not currently breastfeeding.    Assumed care of patient at 1930.  Pt A&Ox4 on RA.  VSS. ABX provided.  LS diminished.  Assist x 1 with GB/Walker. BENJAMIN.  PIV SL in left arm.  Mechanical dental soft diet.  Continue with POC.

## 2023-01-27 NOTE — PLAN OF CARE
Alert and oriented x4  O2 @ 2LPM - 3LPM  Up w/ A1 gbw.   Mech soft diet.   LS diminished throughout  Denies Pain  Expected discharge in 1-2 days pending clinical status and TCU placement     BP 97/47 (BP Location: Right arm)   Pulse 85   Temp 98.2  F (36.8  C) (Oral)   Resp 18   Wt 38.4 kg (84 lb 9.6 oz)   LMP  (LMP Unknown)   SpO2 96%   BMI 17.09 kg/m

## 2023-01-27 NOTE — PLAN OF CARE
Goal Outcome Evaluation:    End of Shift Summary.  For vital signs and complete assessments, please see documentation flowsheets.      Pertinent assessments: Oriented x4. LS diminished/rhonci. Productive cough. On 2L O2. PIC intact. VSS.   Major Shift Events: None  Plan (Upcoming Events): TBD        Bedside Shift Report Completed   Bedside Safety Check Completed

## 2023-01-27 NOTE — PLAN OF CARE
Care from [id number]  Inpatient Progress Note    /63 (BP Location: Right arm)   Pulse 85   Temp 98.6  F (37  C) (Oral)   Resp 16   Wt 38.4 kg (84 lb 9.6 oz)   LMP  (LMP Unknown)   SpO2 93%   BMI 17.09 kg/m      A&O x4. VSS. Up w/ A1 gbw. Mech soft diet. LS diminished throughout. PIV SL. Denies any pain or discomfort at this time and is sleeping in between cares.                   1

## 2023-01-27 NOTE — CONSULTS
"CLINICAL NUTRITION SERVICES  -  ASSESSMENT NOTE    Recommendations Ordered by Registered Dietitian (RD):   Diet per MD - encouraged high protein/high calorie options TID + snacks   Ordered Ensure BID with meals (chocolate and vanilla)   Ordered MVI+M   Malnutrition:   % Weight Loss:  > 10% in 6 months (severe malnutrition)  % Intake:  <75% for >/= 3 months (moderate malnutrition) - suspect low PO intake at baseline given chronically low BMI  Subcutaneous Fat Loss:  Orbital region moderate depletion and Upper arm region moderate-severe depletion - advanced age?  Muscle Loss:  Temporal region modereate depletion, Clavicle bone region moderate-severe depletion, Acromion bone region severe depletion, Scapular bone region severe depletion and Posterior calf region moderate depletion - advanced age?  Fluid Retention:  None noted    Malnutrition Diagnosis: Severe malnutrition  In Context of:  Acute illness or injury  Chronic illness or disease      REASON FOR ASSESSMENT  Marie Kline is a 89 year old female seen by Registered Dietitian for Provider Order - \"Please eval. re. weight loss, risk for malnutrition.\"     Past medical history:  CLL with chronic leukocytosis  range with associated anemia/thrombocytopenia, hx of takosubo CM with EF 30% and cath with minimal CAD in 2018 now resolved with most recent echo in 3/2021 showing EF 60-65 % with evidence of dd, COPD on chronic hypoxic respiratory failure on 2l/NC, htn, hx of bladder cancer s/p TURBT, hypothyroidism, mild dysphagia on DD3, cognitive impairment    Admitted for: CAP and chronic mucous plugging in the setting of ES COPD    NUTRITION HISTORY  - Information obtained from patient and chart   - Typical diet at home: soft foods   - Typical food/fluid intake PTA: patient reports consuming 3 meals per day (small portions) + snacks. Notes no changes to PO PTA.   - Supplements: patient consumes 2-3 ensure per day at home?  - Cooks/prepares meals: patient herself " "prepares all meals   - Food allergies: NKFA    CURRENT NUTRITION ORDERS  Diet Order:     Mechanical/Dental Diet     Current Intake/Tolerance:  Upon review of the flowsheets, pt is consuming 75% of one meal ordered    Obtained from Chart/Interdisciplinary Team:  - Reviewed stooling patterns  - Please refer to flowsheets for more information on skin     ANTHROPOMETRICS  Height: 4' 11\" --> taken on 9/20/2022  Weight: 38.4 kg ( 84 lbs 9.6 oz)   Body mass index is 17.09 kg/m .  Weight Status:  Underweight BMI <18.5  Weight History: weight loss of 5 kg (12%) over the past ~5.5 months. Patient reports usual body weight of 90 lbs about 1 month ago   Wt Readings from Last 10 Encounters:   01/27/23 38.4 kg (84 lb 9.6 oz)   09/20/22 38.1 kg (84 lb)   09/16/22 38.1 kg (84 lb)   09/13/22 38.1 kg (84 lb)   09/07/22 38.1 kg (84 lb)   09/01/22 38.1 kg (84 lb)   08/31/22 38.1 kg (84 lb)   08/26/22 38.1 kg (84 lb)   08/18/22 43.4 kg (95 lb 9.6 oz)   08/16/22 41.7 kg (92 lb)       LABS  Labs reviewed    Labs:  Electrolytes  Potassium (mmol/L)   Date Value   01/27/2023 4.0   01/26/2023 5.4 (H)   01/26/2023 5.6 (H)   05/16/2022 4.3   03/16/2022 4.6   09/23/2021 3.5   07/07/2021 3.9   07/06/2021 3.9   07/06/2021 3.0 (L)     Phosphorus (mg/dL)   Date Value   05/01/2019 3.8   03/12/2018 3.9   05/12/2017 3.3   03/27/2017 2.9   03/26/2017 2.3 (L)    Blood Glucose  Glucose (mg/dL)   Date Value   01/27/2023 97   01/26/2023 114 (H)   01/25/2023 91   09/14/2022 100 (H)   09/02/2022 93   05/16/2022 98   03/16/2022 106 (H)   09/23/2021 89   09/22/2021 184 (H)   09/21/2021 136 (H)   07/07/2021 82   07/06/2021 108 (H)   07/05/2021 98   07/04/2021 117 (H)   03/29/2021 94     GLUCOSE BY METER POCT (mg/dL)   Date Value   08/27/2022 137 (H)     Hemoglobin A1C (%)   Date Value   06/17/2016 5.6   06/16/2016 5.6   10/09/2014 5.6   07/18/2014 5.7    Inflammatory Markers  WBC (10e9/L)   Date Value   07/07/2021 48.8 (H)   07/06/2021 54.6 (HH)   07/05/2021 " 97.4 (HH)     WBC Count (10e3/uL)   Date Value   01/26/2023 114.1 (HH)   01/25/2023 79.7 (HH)   09/16/2022 60.7 (HH)     Albumin (g/dL)   Date Value   01/25/2023 3.8   08/26/2022 3.3 (L)   05/16/2022 3.9   09/21/2021 2.7 (L)   09/20/2021 2.7 (L)   03/27/2021 3.2 (L)   06/30/2020 3.7   07/21/2019 1.7 (L)      Magnesium (mg/dL)   Date Value   08/11/2022 1.9   08/10/2022 1.8   08/09/2022 1.8   05/01/2019 2.0   12/08/2018 2.4 (H)   03/12/2018 2.1     Sodium (mmol/L)   Date Value   01/27/2023 137   01/26/2023 136   01/25/2023 137   07/07/2021 138   07/06/2021 134   07/05/2021 132 (L)    Renal  Urea Nitrogen (mg/dL)   Date Value   01/27/2023 14.1   01/26/2023 14.8   01/25/2023 16.8   05/16/2022 15   03/16/2022 13   09/23/2021 16   07/07/2021 22   07/06/2021 15   07/05/2021 18     Creatinine (mg/dL)   Date Value   01/27/2023 0.83   01/26/2023 0.75   01/25/2023 0.78   07/07/2021 0.78   07/06/2021 0.83   07/05/2021 0.89     Additional  Triglycerides (mg/dL)   Date Value   01/06/2021 102   05/01/2019 160 (H)   03/12/2018 98     Ketones Urine (mg/dL)   Date Value   01/25/2023 Negative   04/15/2021 Negative        MEDICATIONS  Medications reviewed      budesonide  0.5 mg Nebulization BID     escitalopram  10 mg Oral Daily     furosemide  20 mg Oral Daily     ipratropium - albuterol 0.5 mg/2.5 mg/3 mL  3 mL Nebulization 4x daily     levofloxacin  250 mg Intravenous Q24H     levothyroxine  100 mcg Oral Daily     metoprolol succinate ER  25 mg Oral Daily     sodium chloride (PF)  3 mL Intracatheter Q8H         acetaminophen **OR** acetaminophen, albuterol, alum & mag hydroxide-simethicone, bisacodyl, lidocaine 4%, lidocaine (buffered or not buffered), magnesium hydroxide, ondansetron **OR** ondansetron, sodium chloride (PF), traMADol, traZODone     ASSESSED NUTRITION NEEDS PER APPROVED PRACTICE GUIDELINES:    Dosing Weight 38.4 kg   Estimated Energy Needs: 8413-0166+ kcals (30-35+ Kcal/Kg)  Justification: repletion and  underweight, increased needs (COPD)   Estimated Protein Needs: 46-58+ grams protein (1.2-1.5+ g pro/Kg)  Justification: Repletion and preservation of lean body mass, increased needs   Estimated Fluid Needs: per MD     NUTRITION DIAGNOSIS:  Increased nutrient needs (protein/energy) related to repletion and preservation of lean body mass as evidenced by patient with noted moderate-severe fat/msucle loss, a BMI of 17.09 kg/m^2 and a weight loss of 12% over the past ~ 5.5 months; patient to meet a minimum of the above needs as calculated.     NUTRITION INTERVENTIONS  Recommendations / Nutrition Prescription  Diet per MD - encouraged high protein/high calorie options TID + snacks   Ordered Ensure BID with meals (chocolate and vanilla)   Ordered MVI+M    Implementation  Nutrition education: Provided handout on high calorie and high protein diet. Discussed option of ordering more frequent meals and snacks. Also discussed oral nutritional supplements available + indications for use   Medical Food Supplement and Multivitamin/Mineral: as above    Nutrition Goals  Patient to consume 75% of meals or oral nutritional supplements ordered TID    MONITORING AND EVALUATION:  Progress towards goals will be monitored and evaluated per protocol and Practice Guidelines    Rossana Jenkins RD, LD  Clinical Dietitian     3rd floor/ICU: [phone]  All other floors: [phone]  Weekend/holiday: [phone]  Office: [phone]

## 2023-01-27 NOTE — CONSULTS
Care Management Initial Consult    General Information  Assessment completed with: Patient, VM-chart review, Patient  Type of CM/SW Visit: Initial Assessment    Primary Care Provider verified and updated as needed:     Readmission within the last 30 days:        Reason for Consult: discharge planning  Advance Care Planning:            Communication Assessment  Patient's communication style: spoken language (English or Bilingual)    Hearing Difficulty or Deaf: no   Wear Glasses or Blind: yes    Cognitive  Cognitive/Neuro/Behavioral: WDL                      Living Environment:   People in home: alone     Current living Arrangements: independent living facility      Able to return to prior arrangements:         Family/Social Support:  Care provided by: self  Provides care for: no one  Marital Status:   Children          Description of Support System: Supportive, Involved    Support Assessment: Adequate family and caregiver support    Current Resources:   Patient receiving home care services:       Community Resources:    Equipment currently used at home: walker, rolling, grab bar, toilet, grab bar, tub/shower, shower chair  Supplies currently used at home:      Employment/Financial:  Employment Status:          Financial Concerns:             Lifestyle & Psychosocial Needs:  Social Determinants of Health     Tobacco Use: Medium Risk     Smoking Tobacco Use: Former     Smokeless Tobacco Use: Never     Passive Exposure: Not on file   Alcohol Use: Not on file   Financial Resource Strain: Not on file   Food Insecurity: Not on file   Transportation Needs: Not on file   Physical Activity: Not on file   Stress: Not on file   Social Connections: Not on file   Intimate Partner Violence: Not on file   Depression: Not at risk     PHQ-2 Score: 0   Housing Stability: Not on file       Functional Status:  Prior to admission patient needed assistance:   Dependent ADLs:: Ambulation-walker  Dependent IADLs::  Transportation  Assesssment of Functional Status: Not at baseline with ADL Functioning    Mental Health Status:          Chemical Dependency Status:                Values/Beliefs:  Spiritual, Cultural Beliefs, Zoroastrian Practices, Values that affect care:                 Additional Information:  SW met with patient to discuss discharge planning. Patient voiced she is agreeable to the recommendation for a transitional care unit. She requested a referral be sent to [facility] as she has been there multiple times in the past. Patient declined sending referrals for additional options at this time stating she would need to speak with family first & they are flying in from [place] tonight. Patient voiced family will provide transportation at discharge. SW will continue to follow to assist prn with discharge planning.     TINO Wiseman

## 2023-01-27 NOTE — PLAN OF CARE
Goal Outcome Evaluation:       Continue to monitor patient's PO intake, ordered ensure.     Rossana Jenkins RD, LD  Clinical Dietitian     3rd floor/ICU: [phone]  All other floors: [phone]  Weekend/holiday: [phone]  Office: [phone]

## 2023-01-28 NOTE — PROGRESS NOTES
Care Management Discharge Note    Discharge Date: 01/30/2023       Discharge Disposition: Transitional Care, Skilled Nursing Facility    Discharge Services:      Discharge DME:      Discharge Transportation: family or friend will provide    Private pay costs discussed: Not applicable    PAS Confirmation Code:  [id number]  Patient/family educated on Medicare website which has current facility and service quality ratings:      Education Provided on the Discharge Plan:    Persons Notified of Discharge Plans: MD, TCU   Patient/Family in Agreement with the Plan:  yes    Handoff Referral Completed: No    Additional Information:  Patient is medically ready to discharge. PAS completed at 1031. [id number] . Awaiting to hear back from TCU when they can accept. MD updated.       Addendum 1309: JOSIAS updated patient that [facility] has accepted. She wanted SW to call her daughter. Patient called her daughter on her cell and SW spoke with her. Family is not getting to [place] until 2100 tonight and patient has no other way to get to TCU. SW discussed transport costs that patient is not agreeable to. Family will come tomorrow. Call hung up so SW was unable to get a time but will try again later. JOSIAS updated TCU who is [facility] to accept patient anytime tomorrow. JOSIAS updated MD.     DAVID Olmos, [facility]  Emergency Room   [phone]-Please contact the SW on the floor in which the patient is staying for any questions or concerns

## 2023-01-28 NOTE — PLAN OF CARE
Goal Outcome Evaluation:       Patient alert and oriented, VSS, appears to be resting through NOC, denies pain, no acute needs seen will monitor.

## 2023-01-28 NOTE — PLAN OF CARE
Pertinent Assessment: Alert and oriented, hard of hearing, denies pain. 2L NC at the baseline. Lung sounds diminished, infrequent cough, tolerates soft diet. Up with assist of one, walker and gait belt. Bed alarm is on.    Major Shift Events: Most of the sift pt spent sitting in recliner chair. New PIV, old was damaged while going to the bathroom.     Treatment Plan: IV antibiotic Levaquin, Neb treatment.     Bedside Nurse: Abhinav Malin RN

## 2023-01-28 NOTE — PROGRESS NOTES
[facility]    Hospitalist Progress Note    Date of Service (when I saw the patient): 01/28/2023    Assessment & Plan      Marie Kline is a pleasant 89 year old woman with a history of CLL with chronic leukocytosis ( range) with associated anemia/thrombocytopenia; hx of Takotsubo CM with EF = 30% and cath with minimal CAD in 2018 (now resolved, with most recent ECHO in 3/2021 showing EF = 60-65 % with evidence of diastolic dysfunction); COPD and chronic hypoxic respiratory failure on 2L NC, HTN, prior bladder cancer s/p TURBT, hypothyroidism, mild dysphagia on DD3 diet, and cognitive impairment, who was sent in from her infusion center with worsening hypoxia and a fever and was admitted on 1/25/2023 for treatment of Left lower lobe pneumonia.     She reported having a chronic cough which is junky and occasionally productive of white to yellow sputum; denied feeling SOB on admission.     Apparently sats did drop but rebounded to the low to mid 90's when her oxygen was turned up from 2 to 3 L.       CT PE: no pulmonary embolism. Has some small areas of consolidation with mucous plugging in the LLL and moderate BUL emphysema.     CAP  Chronic mucous plugging in the setting of advanced COPD   - 1/25 Procal mildly elevated  - levofloxacin continued; switch to oral, stop date is mentioned in the orders  - BCs negative   - continue acapella QID to help mobilize secretions  - consider mucinex if no further improvements overnight     Acute on chronic hypoxic respiratory failure   Chronic COPD  Physical deconditioning; multifactorial  - continue 2L/NC  - continue PTA inhalers  - PT eval. 1/28    Hyperkalemia; mild.     CLL with anemia/thrombocytopenia  WBC quite variable 's, hgb tends to run in 8-10 range and platelets also very variable 's  - continue current monitoring     Mild dysphagia; stable.  No new issues.  - continue mechanical soft diet     Mild cognitive dysfunction   -  monitor; low risk for delirium, hospitalized many times in the past without issue     Hx of Takotsubo CM --> now resolved   - most recent echo in 3/2021 with normal EF 60-65 %     Cachexia/quite thin; BMI = 18  Weight loss  - RD eval. 1/27     COVID 19  Negative  - S/p 3 shot moderna series followed by Pfizer booster 9/2022 (biv)     DVT Prophylaxis: Pneumatic Compression Devices  Code Status: DNR/DNI  Functional Status: lives in independent living at [facility], ambulates with a walker   Rees: not needed  Access: PIV    Disposition: Expected discharge January 29.  Bed is actually available patient refuses to pay for transportation and her family is out of country till tonight    Narda Conteh MD    [facility] Hospitalist  01/28/2023     Interval History      No acute events overnight  O2 needs stable continues to be on 2 L  Feels fine she is very anxious about going to a TCU because her family is stuck in [place] due to her flight issue      Data reviewed today: I reviewed all new labs and imaging results over the last 24 hours.     Physical Exam   Temp: 98.8  F (37.1  C) Temp src: Oral BP: 108/46 Pulse: 89   Resp: 18 SpO2: 97 % O2 Device: Nasal cannula Oxygen Delivery: 2 LPM  Vitals:    01/25/23 1754 01/27/23 0609   Weight: 40.8 kg (90 lb) 38.4 kg (84 lb 9.6 oz)     Vital Signs with Ranges  Temp:  [98.8  F (37.1  C)] 98.8  F (37.1  C)  Pulse:  [82-96] 89  Resp:  [18] 18  BP: (105-108)/(46-51) 108/46  SpO2:  [93 %-97 %] 97 %  No intake/output data recorded.    Constitutional: very thin; awake, no apparent distress; lying in bed appears somewhat anxious  HEENT: sclerae clear; MM's moist  Respiratory: fair a/e bilaterally; diminished breath sounds throughout; no wheezing; diffuse expiratory rhonchi  Cardiovascular: Regular rate and rhythm, S1, S2 noted; no m/r/g  GI: abdomen flat, + bowel sounds; soft, non-tender, non-distended  Skin/Integumen: no rashes, no cyanosis, no jaundice  Musculoskeletal: no  edema  Neurologic: follows directions well; no focal deficits      Medications       budesonide  0.5 mg Nebulization BID     escitalopram  10 mg Oral Daily     furosemide  20 mg Oral Daily     ipratropium - albuterol 0.5 mg/2.5 mg/3 mL  3 mL Nebulization 4x daily     levofloxacin  250 mg Oral Daily     levothyroxine  100 mcg Oral Daily     metoprolol succinate ER  25 mg Oral Daily     multivitamin w/minerals  1 tablet Oral Daily     sodium chloride (PF)  3 mL Intracatheter Q8H       Data   Recent Labs   Lab 01/28/23  0623 01/27/23  0659 01/26/23  2123 01/26/23  0805 01/25/23  1823 01/25/23  1509   WBC  --   --   --  114.1*  --  79.7*   HGB  --   --   --  9.9*  --  9.1*   MCV  --   --   --  96  --  96   PLT 92*  --   --  91*  --  69*   NA  --  137  --  136  --  137   POTASSIUM  --  4.0 5.4* 5.6*  --  4.5   CHLORIDE  --  98  --  98  --  96*   CO2  --  30*  --  30*  --  33*   BUN  --  14.1  --  14.8  --  16.8   CR 0.88 0.83  --  0.75   < > 0.79   ANIONGAP  --  9  --  8  --  8   CARLOS  --  8.5  --  8.7  --  8.7   GLC  --  97  --  114*  --  91   ALBUMIN  --   --   --   --   --  3.8   PROTTOTAL  --   --   --   --   --  6.2*   BILITOTAL  --   --   --   --   --  0.4   ALKPHOS  --   --   --   --   --  93   ALT  --   --   --   --   --  11   AST  --   --   --   --   --  23    < > = values in this interval not displayed.

## 2023-01-28 NOTE — PLAN OF CARE
Alert and oriented x4  O2 @ 2LPM  Up w/ A1 gbw.   Mech soft diet.   LS diminished throughout  Denies Pain  Expected discharge in 1-2 days pending clinical status and TCU placement     /46 (BP Location: Right arm, Patient Position: Semi-Brooks's, Cuff Size: Adult Regular)   Pulse 86   Temp 98  F (36.7  C) (Oral)   Resp 20   Wt 38.4 kg (84 lb 9.6 oz)   LMP  (LMP Unknown)   SpO2 93%   BMI 17.09 kg/m

## 2023-01-29 NOTE — PROGRESS NOTES
Patient reporting itching all over after receiving trazodone which is one of her home meds.  No other meds given recently.  -Ordered oral diphenhydramine 25 mg every 6 hours as needed

## 2023-01-29 NOTE — PLAN OF CARE
Pertinent Assessment: Alert and oriented, denies pain, lung sounds diminished, on 2L NC. Tolerates soft diet. Up with assist of one.     Major Shift Events: Has been sitting up in chair most of the shift. Started complaining of itching all over her body after Trazodone PRN given. MD cross cover paged.     Treatment Plan: Plan to discharge tomorrow.     Bedside Nurse: Abhinav Malin RN

## 2023-01-29 NOTE — DISCHARGE SUMMARY
[facility]  Discharge Summary  Name: Marie Kline    MRN: [id number]  YOB: 1932    Age: 89 year old  Date of Discharge:  1/29/2023  1:04 PM  Date of Admission: 1/25/2023  Primary Care Provider: Be Dumont  Discharge Physician:  David Ames DO  Discharging Service:  Hospitalist      Hospital Course  Marie Kline is a pleasant 89 year old woman with a history of CLL with chronic leukocytosis ( range) with associated anemia/thrombocytopenia; hx of Takotsubo CM with EF = 30% and cath with minimal CAD in 2018 (now resolved, with most recent ECHO in 3/2021 showing EF = 60-65 % with evidence of diastolic dysfunction); COPD and chronic hypoxic respiratory failure on 2L NC, HTN, prior bladder cancer s/p TURBT, hypothyroidism, mild dysphagia on DD3 diet, and cognitive impairment, who was sent in from her infusion center with worsening hypoxia and a fever and was admitted on 1/25/2023 for treatment of Left lower lobe pneumonia.     She reported having a chronic cough which is junky and occasionally productive of white to yellow sputum; denied feeling SOB on admission.  Apparently sats did drop but rebounded to the low to mid 90's when her oxygen was turned up from 2 to 3 L.  She has since been titrated back down to 1 to 2 L nasal cannula continuous.     CT PE: no pulmonary embolism. Has some small areas of consolidation with mucous plugging in the LLL and moderate BUL emphysema.  She was treated with levofloxacin for treatment of community-acquired pneumonia.  Her breathing improved.  She was discharged in stable condition to TCU.      Discharge Diagnoses:  CAP  COPD, chronic  Acute on chronic hypoxemic respiratory failure  Chronic mucous plugging in the setting of advanced COPD   1/25 Procal mildly elevated.  CT PE did not show pulmonary embolism but did show consolidation with mucous plugging left lower lobe and moderate bilateral upper lobe emphysema.  Suspect recurrent  acute on chronic hypoxemic respiratory failure is related to new community-acquired pneumonia.  For this she was treated with Levaquin as an inpatient with improvement in symptoms.  She would discharge on her baseline 2 L nasal cannula to be used continuously.  Continue Acapella 4 times daily to help mobilize secretions.  Continue Mucinex as an outpatient if patient fails to improve in the coming days.     Hyperkalemia; mild: Replace per protocol inpatient.     CLL with anemia/thrombocytopenia  WBC quite variable 's, hgb tends to run in 8-10 range and platelets also very variable 's.     Mild dysphagia; stable:  No new issues.  Continue mechanical soft diet     Mild cognitive dysfunction: Monitored while inpatient.  No evidence of delirium while inpatient.     Hx of Takotsubo CM --> now resolved.  Most recent echo in 3/2021 with normal EF 60-65 %.     Cachexia/quite thin; BMI = 18  Weight loss  Severe protein calorie malnutrition:  Per dietician  % Weight Loss:  > 10% in 6 months (severe malnutrition)  % Intake:  <75% for >/= 3 months (moderate malnutrition) - suspect low PO intake at baseline given chronically low BMI  Subcutaneous Fat Loss:  Orbital region moderate depletion and Upper arm region moderate-severe depletion - advanced age?  Muscle Loss:  Temporal region modereate depletion, Clavicle bone region moderate-severe depletion, Acromion bone region severe depletion, Scapular bone region severe depletion and Posterior calf region moderate depletion - advanced age?  Fluid Retention:  None noted       COVID 19  Negative  - S/p 3 shot moderna series followed by Pfizer booster 9/2022 (biv)    Discharge Disposition:  Discharged to short-term care facility    Allergies:  Allergies   Allergen Reactions     Diagnostic X-Ray Materials Hives     CT DYE     Contrast Dye Hives     CT DYE     Prolia [Denosumab]      Osteonecrosis jaw       Rocephin [Ceftriaxone] Itching     Wound Dressing Adhesive          Discharge Medications:        Review of your medicines      UNREVIEWED medicines. Ask your doctor about these medicines      Dose / Directions   Octagam 5 GM/100ML Soln  Generic drug: Immune Globulin (Human)      into the vein every 6 weeks.    Hold this medication while in TCU-resume at discharge from TCU  Refills: 0     Revefenacin 175 MCG/3ML Soln      Dose: 175 mcg  Inhale 3 mLs (175 mcg) into the lungs daily  Quantity: 90 mL  Refills: 11        START taking      Dose / Directions   levofloxacin 250 MG tablet  Commonly known as: LEVAQUIN  Indication: Community Acquired Pneumonia  Used for: Pneumonia of left lung due to infectious organism, unspecified part of lung      Dose: 250 mg  Take 1 tablet (250 mg) by mouth daily for 4 doses  Quantity: 4 tablet  Refills: 0        CONTINUE these medicines which have NOT CHANGED      Dose / Directions   ACE/ARB/ARNI NOT PRESCRIBED  Commonly known as: INTENTIONAL  Used for: Chronic congestive heart failure, unspecified heart failure type (H)      Please choose reason not prescribed from choices below.  Refills: 0     acetaminophen 325 MG tablet  Commonly known as: TYLENOL  Used for: Closed fracture of neck of right femur, initial encounter (H)      May take 2 tablets (650 mg) by mouth every 6 hours as needed for fever or pain.  Refills: 0     albuterol 108 (90 Base) MCG/ACT inhaler  Commonly known as: PROAIR HFA/PROVENTIL HFA/VENTOLIN HFA  Used for: Chronic respiratory failure with hypoxia (H)      Dose: 1-2 puff  Inhale 1-2 puffs into the lungs every 6 hours as needed  Quantity: 18 g  Refills: 11     bisacodyl 10 MG suppository  Commonly known as: DULCOLAX  Used for: Closed fracture of neck of right femur, initial encounter (H)      Dose: 10 mg  Place 1 suppository (10 mg) rectally daily as needed for constipation  Refills: 0     budesonide 0.5 MG/2ML neb solution  Commonly known as: PULMICORT      Dose: 0.5 mg  Take 2 mLs (0.5 mg) by nebulization 2 times  daily  Quantity: 120 mL  Refills: 11     escitalopram 10 MG tablet  Commonly known as: LEXAPRO  Used for: LESLY (generalized anxiety disorder)      Dose: 10 mg  Take 1 tablet (10 mg) by mouth daily  Quantity: 90 tablet  Refills: 1     ferrous sulfate 325 (65 Fe) MG tablet  Commonly known as: FEROSUL      Dose: 325 mg  Take 325 mg by mouth daily (with breakfast)  Refills: 0     furosemide 20 MG tablet  Commonly known as: LASIX      Dose: 20 mg  Take 20 mg by mouth daily  Refills: 0     levothyroxine 100 MCG tablet  Commonly known as: SYNTHROID/LEVOTHROID      Dose: 100 mcg  Take 100 mcg by mouth daily  Refills: 0     loperamide 2 MG capsule  Commonly known as: IMODIUM  Used for: Diarrhea, unspecified type      TAKE 1 CAPSULE (2 MG) BY MOUTH 3 TIMES DAILY AS NEEDED FOR DIARRHEA  Quantity: 60 capsule  Refills: 0     metoprolol succinate ER 25 MG 24 hr tablet  Commonly known as: TOPROL XL  Used for: ACS (acute coronary syndrome) (H)      Dose: 25 mg  Take 1 tablet (25 mg) by mouth daily  Quantity: 90 tablet  Refills: 2     Multivitamin Adult Chew      Dose: 2 chew tab  Take 2 chew tab by mouth daily  Refills: 0     senna-docusate 8.6-50 MG tablet  Commonly known as: SENOKOT-S/PERICOLACE  Used for: Closed fracture of neck of right femur, initial encounter (H)      Dose: 1 tablet  Take 1 tablet by mouth 2 times daily as needed for constipation  Quantity: 30 tablet  Refills: 0     traMADol 50 MG tablet  Commonly known as: ULTRAM  Used for: Closed fracture of neck of right femur, initial encounter (H)      Dose: 25-50 mg  Take 0.5-1 tablets (25-50 mg) by mouth every 6 hours as needed for moderate to severe pain (Take 0.5 tab (25 mg) for pain 6-8. Take 1 tab (50 mg) for pain 9-10.)  Quantity: 20 tablet  Refills: 0     traZODone 50 MG tablet  Commonly known as: DESYREL  Used for: Sleep difficulties      TAKE TWO TABLETS BY MOUTH DAILY AT BEDTIME AS NEEDED FOR SLEEP  Quantity: 180 tablet  Refills: 1            Condition on  "Discharge:  Discharge condition: Good       Code status on discharge: DNR / DNI     History of Illness:  See detailed admission note for full details.    Physical Exam:  Vital signs:  Temp: 99.2  F (37.3  C) Temp src: Oral BP: 103/46 Pulse: 70   Resp: 16 SpO2: 97 % O2 Device: Nasal cannula Oxygen Delivery: 2 LPM   Weight: 38.4 kg (84 lb 9.6 oz)  Estimated body mass index is 17.09 kg/m  as calculated from the following:    Height as of 9/20/22: 1.499 m (4' 11\").    Weight as of this encounter: 38.4 kg (84 lb 9.6 oz).    Wt Readings from Last 1 Encounters:   01/27/23 38.4 kg (84 lb 9.6 oz)     General: Alert, awake, no acute distress.  Emaciated, frail.  Lying flat in bed.  HEENT: NC/AT, eyes anicteric, external occular movements intact, face symmetric.   Cardiac: RRR, S1, S2.  No murmurs appreciated.  Pulmonary: Normal chest rise, normal work of breathing.  Lungs with no obvious wheezing.  There is some crackles noted bilaterally.  Good airway exchange.  Abdomen: soft, non-tender, non-distended.  Bowel Sounds Present.  No guarding.  Extremities: no deformities.  Warm, well perfused.  Skin: no rashes or lesions noted.  Warm and Dry.  Chronic skin color changes to bilateral lower extremities.  Neuro: No focal deficits noted.  Speech clear.  Coordination and strength grossly normal.  Psych: Appropriate affect.    Procedures other than Imaging:  None     Imaging:  No results found for this or any previous visit (from the past 48 hour(s)).     Consultations:  No consultations were requested during this admission.       Recent Lab Results:  Recent Labs   Lab 01/28/23  0623 01/26/23  0805 01/25/23  1509   WBC  --  114.1* 79.7*   HGB  --  9.9* 9.1*   HCT  --  33.5* 30.8*   MCV  --  96 96   PLT 92* 91* 69*          Lab Results   Component Value Date     01/27/2023     01/26/2023     01/25/2023     07/07/2021     07/06/2021     07/05/2021    Lab Results   Component Value Date    CHLORIDE 98 " 01/27/2023    CHLORIDE 98 01/26/2023    CHLORIDE 96 01/25/2023    CHLORIDE 99 05/16/2022    CHLORIDE 99 03/16/2022    CHLORIDE 97 09/23/2021    CHLORIDE 102 07/07/2021    CHLORIDE 98 07/06/2021    CHLORIDE 95 07/05/2021    Lab Results   Component Value Date    BUN 14.1 01/27/2023    BUN 14.8 01/26/2023    BUN 16.8 01/25/2023    BUN 15 05/16/2022    BUN 13 03/16/2022    BUN 16 09/23/2021    BUN 22 07/07/2021    BUN 15 07/06/2021    BUN 18 07/05/2021      Lab Results   Component Value Date    POTASSIUM 4.0 01/27/2023    POTASSIUM 5.4 01/26/2023    POTASSIUM 5.6 01/26/2023    POTASSIUM 4.3 05/16/2022    POTASSIUM 4.6 03/16/2022    POTASSIUM 3.5 09/23/2021    POTASSIUM 3.9 07/07/2021    POTASSIUM 3.9 07/06/2021    POTASSIUM 3.0 07/06/2021    Lab Results   Component Value Date    CO2 30 01/27/2023    CO2 30 01/26/2023    CO2 33 01/25/2023    CO2 30 05/16/2022    CO2 31 03/16/2022    CO2 33 09/23/2021    CO2 35 07/07/2021    CO2 33 07/06/2021    CO2 34 07/05/2021    Lab Results   Component Value Date    CR 0.88 01/28/2023    CR 0.83 01/27/2023    CR 0.75 01/26/2023    CR 0.78 07/07/2021    CR 0.83 07/06/2021    CR 0.89 07/05/2021             Pending Results:    Unresulted Labs Ordered in the Past 30 Days of this Admission     Date and Time Order Name Status Description    1/25/2023  5:46 PM Blood Culture Arm, Right Preliminary     1/25/2023  5:18 PM Blood Culture Peripheral Blood Preliminary            Discharge Instructions and Follow-Up:   Discharge Procedure Orders   Primary Care - Care Coordination Referral   Standing Status: Future   Referral Priority: Routine: Next available opening Referral Type: Care Coordination   Number of Visits Requested: 1     General info for SNF   Order Comments: Length of Stay Estimate: Short Term Care: Estimated # of Days <30  Condition at Discharge: Stable  Level of care:skilled   Rehabilitation Potential: Good  Admission H&P remains valid and up-to-date: Yes  Recent Chemotherapy:  N/A  Use Nursing Home Standing Orders: Yes     Mantoux instructions   Order Comments: Give two-step Mantoux (PPD) Per Facility Policy Yes     Follow Up and recommended labs and tests   Order Comments: Follow up with Nursing home physician.  No follow up labs or test are needed.     Reason for your hospital stay   Order Comments: You were hospitalized for a pneumonia (infection in lungs). You were treated with antibiotics and oxygen support.     Activity - Up with nursing assistance     Order Specific Question Answer Comments   Is discharge order? Yes      Physical Therapy Adult Consult   Order Comments: Evaluate and treat as clinically indicated.    Reason:  Eval and recs for discharge     Occupational Therapy Adult Consult   Order Comments: Evaluate and treat as clinically indicated.    Reason: Eval and recs for discharge     Oxygen (SNF/TCU) Discharge     Order Specific Question Answer Comments   Oxygen device Nasal cannula    Oxygen Administration Continuous    Keep SaO2 above 90%    Liters per minute 2      Diet   Order Comments: Follow this diet upon discharge: Orders Placed This Encounter      Snacks/Supplements Adult: Ensure Enlive; With Meals      Mechanical/Dental Soft Diet     Order Specific Question Answer Comments   Is discharge order? Yes        Total time spent in face to face contact with the patient and coordinating discharge was:  >30 Minutes.

## 2023-01-29 NOTE — PROGRESS NOTES
VSS on 2L nc. BP slightly soft, not within notifying parameters. AxOx4. Ax1 w/ GB&walker. PIV SL. Denies pain/n/v/SOB/chest pain. Tolerating mech soft diet. Voiding adequately. Plan is to discharge today to [facility] w/ family transporting. Will continue to provide supportive cares.     Assumed Care: [id number]

## 2023-01-29 NOTE — PROGRESS NOTES
Physical Therapy Discharge Summary    Reason for therapy discharge:    Discharged to transitional care facility.    Progress towards therapy goal(s). See goals on Care Plan in [facility] electronic health record for goal details.  Goals not met.  Barriers to achieving goals:   discharge from facility.    Therapy recommendation(s):    Continued therapy is recommended.  Rationale/Recommendations:   Pt would benefit from further skilled therapy in TCU setting to maximize safety and activity tolerance before returning to ILF..

## 2023-01-29 NOTE — PLAN OF CARE
"            Alert and oriented x4  O2 @ 2LPM  Up w/ A1 gbw.   Mech soft diet.   LS diminished throughout  Denies Pain    Pt received discharge instructions and stated understanding. \"I have done this plenty of times\"  Pt is going to [facility] w/ family transporting  IVs removed. Transported with Wheelchair to front with O2 at 2LPM  All belongings sent with pt at 12:49 PM          "

## 2023-01-30 NOTE — PROGRESS NOTES
Clinic Care Coordination Contact  Care Team Conversations    Situation: CC following patient through TCU care progression.    Background: Patient was hospitalized at [facility] from 1/25/2023 to 1/29/2023 with diagnosis of CAP, COPD- chronic, Acute on chronic hypoxemic respiratory failure, Chronic mucous plugging in the setting of advanced COPD.    Assessment: Patient was discharged to [facility] ([facility]) on 1/29/2023.     Plan/Recommendations: [facility] will send TCU Care Team Care Management hand in communication and request involvement in discharge planning and coordination of care.      DAVID Nuñez/[facility] CenterJOSIAS  Social Work Care Coordinator  [facility] - [place], [place], and [place]  Phone: [phone]

## 2023-01-30 NOTE — LETTER
1/30/2023        RE: Marie Kline  [address]  [facility] [address]        [facility] GERIATRICS    PRIMARY CARE PROVIDER AND CLINIC:  Be Dumont MD, [address] / [facility] [address]  Chief Complaint   Patient presents with     Hospital F/U      [place] Medical Record Number:  [id number]  Place of Service where encounter took place:  [facility]  ([facility]) [[id number]]    Marie Kline  is a 89 year old  (4/28/1932), admitted to the above facility from  [facility]. Hospital stay 1/25/23 through 1/29/23..   HPI:    Past medical history significant for coronary artery disease, hypertension, stress cardiomyopathy, COPD on chronic 2L of oxygen, chronic anemia, CLL, bladder cancer, cognitive impairment, LESLY, hypothyroidism.    Summary of recent hospitalization:  Patient was admitted at [facility] from 1/25 to 1/29/2023 for pneumonia, acute on chronic hypoxemic respiratory failure.  She presented to the emergency department for evaluation of hypoxia, shortness of breath.  Laboratory evaluation significant for CO2 33, lactic acid 0.5, troponin high-sensitivity 35, D-dimer 1.18, white blood cell count 79.7, hemoglobin 9.1, platelet count 69.  Influenza AMB, RSV, COVID-19 screening negative.  Blood cultures negative.  CT chest PE shows no pulmonary embolism, small areas of consolidation with scattered areas of mucous plugging within the left lower lobe, likely representing infection, moderate upper lobe predominant emphysema, mildly enlarged mediastinal lymph node and splenomegaly, likely related to patient's known CLL.  Chest x-ray shows stable cardiomediastinal silhouette with likely persistent mediastinal lymphadenopathy, most pronounced in the AP window, stable emphysematous changes and scarring without new airspace consolidation, pleural effusion, pneumothorax, no acute bony abnormality.  She was admitted for pneumonia and chronic  mucous plugging and was started on Levaquin and to complete 7 day coursee. Started on acapella for secretions. Procal elevated 0.15. Discharged to TCU for physical rehabilitation and medical management.     Patient was seen today for admission visit in the TCU.  She is currently on 2 L of oxygen via nasal cannula.  She denies any shortness of breath.  She does report a chronic cough.  She denies dizziness/lightheadedness, chest pain.  She reports bowels are moving regularly.  She denies any dysuria/trouble voiding.  [place]  We discussed what to expect in the TCU today.      I reviewed facility EMR including medications, vital signs, recent nursing progress notes.  Also discussed with the nursing staff and clarified admission orders.  I updated nursing on plan of care as below.      CODE STATUS/ADVANCE DIRECTIVES DISCUSSION:  Prior  DNR / DNI  ALLERGIES:   Allergies   Allergen Reactions     Diagnostic X-Ray Materials Hives     CT DYE     Contrast Dye Hives     CT DYE     Prolia [Denosumab]      Osteonecrosis jaw       Rocephin [Ceftriaxone] Itching     Wound Dressing Adhesive       PAST MEDICAL HISTORY:   Past Medical History:   Diagnosis Date     Arthritis     Generalized     Bladder cancer (H)      CLL (chronic lymphocytic leukemia) (H)      Cognitive impairment 08/2022 16/30 slums     Congestive heart failure (H) 10/24/2014    flash pulm edema associated w/Takotsubo     COPD (chronic obstructive pulmonary disease) (H)     chronic O2 2L/NC     Dysphagia     on DD3     Hypertension      Hypothyroid      Mumps      Pulmonary edema cardiac cause (H) 10/08/2014    associated w/Takotsubo ACS     Stress-induced cardiomyopathy 10/2018    cath with minimal dz,  EF 30-35 %improved to 60-65 % by echo 3/2021     Tricuspid valve disorders, 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    TR 2-3+ per echo      PAST SURGICAL HISTORY:   has a past surgical history that includes Coronary Angiography Adult Order  (10/2014); Cystoscopy, biopsy bladder, combined (N/A, 2/9/2016); Cystoscopy, transurethral resection (TUR) tumor bladder, combined (N/A, 2/9/2016); Esophagoscopy, gastroscopy, duodenoscopy (EGD), combined (N/A, 6/22/2016); Cystoscopy; Bladder surgery; Biopsy Lymph Node Inguinal (Right, 10/23/2018); Open reduction internal fixation hip bipolar (Left, 11/19/2018); Left Heart Catheterization (N/A, 12/11/2018); and Open reduction internal fixation hip unipolar (Right, 8/26/2022).  FAMILY HISTORY: family history includes Cancer in her father; Cerebrovascular Disease in her mother.  SOCIAL HISTORY:   reports that she quit smoking about 27 years ago. Her smoking use included cigarettes. She has never used smokeless tobacco. She reports that she does not drink alcohol and does not use drugs.  Patient's living condition: lives alone    Post Discharge Medication Reconciliation Status:   MED REC REQUIRED{  Post Medication Reconciliation Status:  Discharge medications reconciled and changed, see notes/orders        Current Outpatient Medications   Medication Sig     acetaminophen (TYLENOL) 325 MG tablet May take 2 tablets (650 mg) by mouth every 6 hours as needed for fever or pain.     albuterol (PROAIR HFA/PROVENTIL HFA/VENTOLIN HFA) 108 (90 Base) MCG/ACT inhaler Inhale 1-2 puffs into the lungs every 6 hours as needed     bisacodyl (DULCOLAX) 10 MG suppository Place 1 suppository (10 mg) rectally daily as needed for constipation     budesonide (PULMICORT) 0.5 MG/2ML neb solution Take 2 mLs (0.5 mg) by nebulization 2 times daily     escitalopram (LEXAPRO) 10 MG tablet Take 1 tablet (10 mg) by mouth daily     ferrous sulfate (IRON) 325 (65 FE) MG tablet Take 325 mg by mouth daily (with breakfast)      furosemide (LASIX) 20 MG tablet Take 20 mg by mouth daily     levofloxacin (LEVAQUIN) 250 MG tablet Take 1 tablet (250 mg) by mouth daily for 4 doses     levothyroxine (SYNTHROID/LEVOTHROID) 100 MCG tablet Take 100 mcg by mouth  "daily     metoprolol succinate ER (TOPROL XL) 25 MG 24 hr tablet Take 1 tablet (25 mg) by mouth daily     Multiple Vitamins-Minerals (MULTIVITAMIN ADULT) CHEW Take 2 chew tab by mouth daily     senna-docusate (SENOKOT-S/PERICOLACE) 8.6-50 MG tablet Take 1 tablet by mouth 2 times daily as needed for constipation     traZODone (DESYREL) 50 MG tablet TAKE TWO TABLETS BY MOUTH DAILY AT BEDTIME AS NEEDED FOR SLEEP     ACE/ARB/ARNI NOT PRESCRIBED (INTENTIONAL) Please choose reason not prescribed from choices below.     Immune Globulin, Human, (OCTAGAM) 5 GM/100ML SOLN into the vein every 6 weeks.    Hold this medication while in TCU-resume at discharge from TCU (Patient not taking: Reported on 1/25/2023)     Revefenacin 175 MCG/3ML SOLN Inhale 3 mLs (175 mcg) into the lungs daily (Patient not taking: Reported on 1/25/2023)     No current facility-administered medications for this visit.       ROS:  10 point ROS of systems including Constitutional, Eyes, Respiratory, Cardiovascular, Gastroenterology, Genitourinary, Integumentary, Musculoskeletal, Psychiatric were all negative except for pertinent positives noted in my HPI.    Vitals:  BP 97/56   Pulse 84   Temp 98.7  F (37.1  C)   Resp 18   Ht 1.499 m (4' 11\")   Wt 40.9 kg (90 lb 3.2 oz)   LMP  (LMP Unknown)   SpO2 92%   BMI 18.22 kg/m    Exam:  GENERAL APPEARANCE:  Alert, in NAD  HEENT: normocephalic, moist mucous membranes, nose without drainage or crusting  RESP:  respiratory effort normal, no respiratory distress, Lung sounds clear, patient is on RA  CV: auscultation of heart done, rate and rhythm regular   ABDOMEN: + bowel sounds, soft, nontender, no grimacing or guarding with palpation.  M/S: no lower extremity edema  SKIN:  Inspection and palpation of skin and subcutaneous tissue: skin warm, dry without rashes  NEURO: cranial nerves 2-12 grossly intact and at patient's baseline; no facial asymmetry, no speech deficits and able to follow directions  PSYCH: " oriented x 3, affect and mood normal    Lab/Diagnostic data:  Labs done in SNF are in [facility]. Please refer to them using [employer] Everywhere. and Recent labs in [facility] reviewed by me today.     ASSESSMENT/PLAN:  Community acquired pneumonia  Mucous plugging  Chronic COPD  Acute on chronic hypoxemic respiratory failure  -started on levaquin inpatient  -denies SOB today  -is on baseline chronic oxygen today at 2L via NC and saturations 90-94%  -afebrile and hemodynamically stable  Plan: Continue albuterol 2 puffs every 6 hours as needed, budesonide twice daily.  Hold revefenacin in the TCU and resume at discharge.  Continue oxygen 2 L via nasal cannula.Monitor respiratory status.    Essential hypertension   History of stress cardiomyopathy, LVEF 30-35% in 12/2018 and is no recovered LVEF 60-65% 3/2021 and no significant CAD per angiogram in 12/2018  Mild 1+ mitral regurgitation per ECHO 3/2021   Mild 1+ tricsupid regurgitation per ECHO 3/2021   Mild 1+ pulmonic regurgitation per ECHO 3/2021   Mild 1+ aortic regurgitation per ECHO 3/2021   Chronic  -BP soft at times  Plan: Continue metoprolol ER 25 mg daily, lasix 20 mg daily. BMP 1/31/2023. Daily weights. Notify provider with weight gain >2 lbs in a day, >5 lbs in on week. 2 gram Na diet. Follow up with cardiology per recommendations. VS per policy. Adjust medications as needed.    CLL  Chronic anemia  Thrombocytopenia  Leukocytosis,  range at baseline  -follows with Dr. Ivory at [facility]  -baseline hemoglobin 8-9 lately  -baseline platelet 50-normal range  -.1, hemoglobin 9.9, platelet count 91,000 on 1/26/2023  Plan: CBC 1/31. Continue ferrous sulfate 325 mg daily. Follow-up with oncology per recommendation    Dysphagia  Chronic  Plan: Continue mechanical soft diet.    Hypothyroidism  -TSH 12.93 on 5/16/2022  Plan: Continue levothyroxine 100 mcg daily. TSH 1/31/2023    History of bladder cancer status post TURBT  Plan: Follow up  outpatient    Pain  -did not use tramadol inpatient and no complaints of pain today  Plan: Discontinue tramadol.  Continue Tylenol 650 mg every 6 hours as needed.    Generalized anxiety disorder  Insomnia  Chronic  Plan: Continue escitalopram 10 mg p.o. daily, trazodone 100 mg at bedtime as needed.  Monitor mood and symptoms.  Consider ACP referral.    Slow transit constipation  -bowels moving regularly  Plan: Discontinue loperamide. Continue bisacodyl 10 mg daily as needed, senna s 1 tab BID PRN. Monitor bowels.    Moderate malnutrition  -per inpatient dietician assessment  Plan: Continue multivitamin daily. Dietician following. Supplements per dietician. Monitor weights and po intake    Cognitive impairment  -SLUMS 16/30 during TCU stay here in 8/2022  Plan: OCCUPATIONAL THERAPY to complete cognitive testing for safe discharge planning. Nursing to assist with cares, meals, medication assistance, activities.    Physical deconditioning  Comment: Acute, secondary to recent hospitalization, medical conditions as above  Plan: Encourage participation in physical therapy/occupational therapy for strengthening and deconditioning. Discharge planning per their recommendation. Social work to assist with d/c planning.      Disclaimer: This note may contain text created using speech-recognition software and may contain unintended word substitutions.       Electronically signed by:  NOHEMI Stone CNP                       Sincerely,        NOHEMI Stone CNP

## 2023-01-30 NOTE — PROGRESS NOTES
[facility] GERIATRICS    PRIMARY CARE PROVIDER AND CLINIC:  Be Dumont MD[facility] [address] [facility] [address]  Chief Complaint   Patient presents with     Hospital F/U      [place] Medical Record Number:  [id number]  Place of Service where encounter took place:  [facility]  ([facility]) [[id number]]    Marie Kline  is a 89 year old  (4/28/1932), admitted to the above facility from  [facility]. Hospital stay 1/25/23 through 1/29/23..   HPI:    Past medical history significant for coronary artery disease, hypertension, stress cardiomyopathy, COPD on chronic 2L of oxygen, chronic anemia, CLL, bladder cancer, cognitive impairment, LESLY, hypothyroidism.    Summary of recent hospitalization:  Patient was admitted at [facility] from 1/25 to 1/29/2023 for pneumonia, acute on chronic hypoxemic respiratory failure.  She presented to the emergency department for evaluation of hypoxia, shortness of breath.  Laboratory evaluation significant for CO2 33, lactic acid 0.5, troponin high-sensitivity 35, D-dimer 1.18, white blood cell count 79.7, hemoglobin 9.1, platelet count 69.  Influenza AMB, RSV, COVID-19 screening negative.  Blood cultures negative.  CT chest PE shows no pulmonary embolism, small areas of consolidation with scattered areas of mucous plugging within the left lower lobe, likely representing infection, moderate upper lobe predominant emphysema, mildly enlarged mediastinal lymph node and splenomegaly, likely related to patient's known CLL.  Chest x-ray shows stable cardiomediastinal silhouette with likely persistent mediastinal lymphadenopathy, most pronounced in the AP window, stable emphysematous changes and scarring without new airspace consolidation, pleural effusion, pneumothorax, no acute bony abnormality.  She was admitted for pneumonia and chronic mucous plugging and was started on Levaquin and to complete 7 day coursee. Started on acapella for  secretions. Procal elevated 0.15. Discharged to TCU for physical rehabilitation and medical management.     Patient was seen today for admission visit in the TCU.  She is currently on 2 L of oxygen via nasal cannula.  She denies any shortness of breath.  She does report a chronic cough.  She denies dizziness/lightheadedness, chest pain.  She reports bowels are moving regularly.  She denies any dysuria/trouble voiding.  [place].  We discussed what to expect in the TCU today.      I reviewed facility EMR including medications, vital signs, recent nursing progress notes.  Also discussed with the nursing staff and clarified admission orders.  I updated nursing on plan of care as below.      CODE STATUS/ADVANCE DIRECTIVES DISCUSSION:  Prior  DNR / DNI  ALLERGIES:   Allergies   Allergen Reactions     Diagnostic X-Ray Materials Hives     CT DYE     Contrast Dye Hives     CT DYE     Prolia [Denosumab]      Osteonecrosis jaw       Rocephin [Ceftriaxone] Itching     Wound Dressing Adhesive       PAST MEDICAL HISTORY:   Past Medical History:   Diagnosis Date     Arthritis     Generalized     Bladder cancer (H)      CLL (chronic lymphocytic leukemia) (H)      Cognitive impairment 08/2022 16/30 slums     Congestive heart failure (H) 10/24/2014    flash pulm edema associated w/Takotsubo     COPD (chronic obstructive pulmonary disease) (H)     chronic O2 2L/NC     Dysphagia     on DD3     Hypertension      Hypothyroid      Mumps      Pulmonary edema cardiac cause (H) 10/08/2014    associated w/Takotsubo ACS     Stress-induced cardiomyopathy 10/2018    cath with minimal dz,  EF 30-35 %improved to 60-65 % by echo 3/2021     Tricuspid valve disorders, 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    TR 2-3+ per echo      PAST SURGICAL HISTORY:   has a past surgical history that includes Coronary Angiography Adult Order (10/2014); Cystoscopy, biopsy bladder, combined (N/A, 2/9/2016); Cystoscopy, transurethral resection  (TUR) tumor bladder, combined (N/A, 2/9/2016); Esophagoscopy, gastroscopy, duodenoscopy (EGD), combined (N/A, 6/22/2016); Cystoscopy; Bladder surgery; Biopsy Lymph Node Inguinal (Right, 10/23/2018); Open reduction internal fixation hip bipolar (Left, 11/19/2018); Left Heart Catheterization (N/A, 12/11/2018); and Open reduction internal fixation hip unipolar (Right, 8/26/2022).  FAMILY HISTORY: family history includes Cancer in her father; Cerebrovascular Disease in her mother.  SOCIAL HISTORY:   reports that she quit smoking about 27 years ago. Her smoking use included cigarettes. She has never used smokeless tobacco. She reports that she does not drink alcohol and does not use drugs.  Patient's living condition: lives alone    Post Discharge Medication Reconciliation Status:   MED REC REQUIRED{  Post Medication Reconciliation Status:  Discharge medications reconciled and changed, see notes/orders        Current Outpatient Medications   Medication Sig     acetaminophen (TYLENOL) 325 MG tablet May take 2 tablets (650 mg) by mouth every 6 hours as needed for fever or pain.     albuterol (PROAIR HFA/PROVENTIL HFA/VENTOLIN HFA) 108 (90 Base) MCG/ACT inhaler Inhale 1-2 puffs into the lungs every 6 hours as needed     bisacodyl (DULCOLAX) 10 MG suppository Place 1 suppository (10 mg) rectally daily as needed for constipation     budesonide (PULMICORT) 0.5 MG/2ML neb solution Take 2 mLs (0.5 mg) by nebulization 2 times daily     escitalopram (LEXAPRO) 10 MG tablet Take 1 tablet (10 mg) by mouth daily     ferrous sulfate (IRON) 325 (65 FE) MG tablet Take 325 mg by mouth daily (with breakfast)      furosemide (LASIX) 20 MG tablet Take 20 mg by mouth daily     levofloxacin (LEVAQUIN) 250 MG tablet Take 1 tablet (250 mg) by mouth daily for 4 doses     levothyroxine (SYNTHROID/LEVOTHROID) 100 MCG tablet Take 100 mcg by mouth daily     metoprolol succinate ER (TOPROL XL) 25 MG 24 hr tablet Take 1 tablet (25 mg) by mouth daily  "    Multiple Vitamins-Minerals (MULTIVITAMIN ADULT) CHEW Take 2 chew tab by mouth daily     senna-docusate (SENOKOT-S/PERICOLACE) 8.6-50 MG tablet Take 1 tablet by mouth 2 times daily as needed for constipation     traZODone (DESYREL) 50 MG tablet TAKE TWO TABLETS BY MOUTH DAILY AT BEDTIME AS NEEDED FOR SLEEP     ACE/ARB/ARNI NOT PRESCRIBED (INTENTIONAL) Please choose reason not prescribed from choices below.     Immune Globulin, Human, (OCTAGAM) 5 GM/100ML SOLN into the vein every 6 weeks.    Hold this medication while in TCU-resume at discharge from TCU (Patient not taking: Reported on 1/25/2023)     Revefenacin 175 MCG/3ML SOLN Inhale 3 mLs (175 mcg) into the lungs daily (Patient not taking: Reported on 1/25/2023)     No current facility-administered medications for this visit.       ROS:  10 point ROS of systems including Constitutional, Eyes, Respiratory, Cardiovascular, Gastroenterology, Genitourinary, Integumentary, Musculoskeletal, Psychiatric were all negative except for pertinent positives noted in my HPI.    Vitals:  BP 97/56   Pulse 84   Temp 98.7  F (37.1  C)   Resp 18   Ht 1.499 m (4' 11\")   Wt 40.9 kg (90 lb 3.2 oz)   LMP  (LMP Unknown)   SpO2 92%   BMI 18.22 kg/m    Exam:  GENERAL APPEARANCE:  Alert, in NAD  HEENT: normocephalic, moist mucous membranes, nose without drainage or crusting  RESP:  respiratory effort normal, no respiratory distress, Lung sounds clear, patient is on RA  CV: auscultation of heart done, rate and rhythm regular   ABDOMEN: + bowel sounds, soft, nontender, no grimacing or guarding with palpation.  M/S: no lower extremity edema  SKIN:  Inspection and palpation of skin and subcutaneous tissue: skin warm, dry without rashes  NEURO: cranial nerves 2-12 grossly intact and at patient's baseline; no facial asymmetry, no speech deficits and able to follow directions  PSYCH: oriented x 3, affect and mood normal    Lab/Diagnostic data:  Labs done in SNF are in [place] [facility]. " Please refer to them using [employer] Everywhere. and Recent labs in [facility] reviewed by me today.     ASSESSMENT/PLAN:  Community acquired pneumonia  Mucous plugging  Chronic COPD  Acute on chronic hypoxemic respiratory failure  -started on levaquin inpatient  -denies SOB today  -is on baseline chronic oxygen today at 2L via NC and saturations 90-94%  -afebrile and hemodynamically stable  Plan: Continue albuterol 2 puffs every 6 hours as needed, budesonide twice daily.  Hold revefenacin in the TCU and resume at discharge.  Continue oxygen 2 L via nasal cannula.Monitor respiratory status.    Essential hypertension   History of stress cardiomyopathy, LVEF 30-35% in 12/2018 and is no recovered LVEF 60-65% 3/2021 and no significant CAD per angiogram in 12/2018  Mild 1+ mitral regurgitation per ECHO 3/2021   Mild 1+ tricsupid regurgitation per ECHO 3/2021   Mild 1+ pulmonic regurgitation per ECHO 3/2021   Mild 1+ aortic regurgitation per ECHO 3/2021   Chronic  -BP soft at times  Plan: Continue metoprolol ER 25 mg daily, lasix 20 mg daily. BMP 1/31/2023. Daily weights. Notify provider with weight gain >2 lbs in a day, >5 lbs in on week. 2 gram Na diet. Follow up with cardiology per recommendations. VS per policy. Adjust medications as needed.    CLL  Chronic anemia  Thrombocytopenia  Leukocytosis,  range at baseline  -follows with Dr. Ivory at [facility]  -baseline hemoglobin 8-9 lately  -baseline platelet 50-normal range  -.1, hemoglobin 9.9, platelet count 91,000 on 1/26/2023  Plan: CBC 1/31. Continue ferrous sulfate 325 mg daily. Follow-up with oncology per recommendation    Dysphagia  Chronic  Plan: Continue mechanical soft diet.    Hypothyroidism  -TSH 12.93 on 5/16/2022  Plan: Continue levothyroxine 100 mcg daily. TSH 1/31/2023    History of bladder cancer status post TURBT  Plan: Follow up outpatient    Pain  -did not use tramadol inpatient and no complaints of pain today  Plan: Discontinue tramadol.   Continue Tylenol 650 mg every 6 hours as needed.    Generalized anxiety disorder  Insomnia  Chronic  Plan: Continue escitalopram 10 mg p.o. daily, trazodone 100 mg at bedtime as needed.  Monitor mood and symptoms.  Consider ACP referral.    Slow transit constipation  -bowels moving regularly  Plan: Discontinue loperamide. Continue bisacodyl 10 mg daily as needed, senna s 1 tab BID PRN. Monitor bowels.    Moderate malnutrition  -per inpatient dietician assessment  Plan: Continue multivitamin daily. Dietician following. Supplements per dietician. Monitor weights and po intake    Cognitive impairment  -SLUMS 16/30 during TCU stay here in 8/2022  Plan: OCCUPATIONAL THERAPY to complete cognitive testing for safe discharge planning. Nursing to assist with cares, meals, medication assistance, activities.    Physical deconditioning  Comment: Acute, secondary to recent hospitalization, medical conditions as above  Plan: Encourage participation in physical therapy/occupational therapy for strengthening and deconditioning. Discharge planning per their recommendation. Social work to assist with d/c planning.      Disclaimer: This note may contain text created using speech-recognition software and may contain unintended word substitutions.       Electronically signed by:  NOHEMI Stone CNP

## 2023-01-30 NOTE — PATIENT INSTRUCTIONS
Naima Kline  4/28/1932  1) Discontinue loperamide  2) CBC, TSH, BMP 1/31/2023. Diagnosis: hypothyroidism, anemia, CHF  3)  Daily weights. Notify provider with weight gain >2 lbs in a day, >5 lbs in on week.  4) Discontinue tramadol.  NOHEMI Stone CNP on 1/30/2023 at 2:33 PM

## 2023-01-31 NOTE — PROGRESS NOTES
Naima Kline  4/28/1932  1) Reduce levothyroxine to 88 mcg daily. Diagnosis: hypothyroidism  2) Recheck TSH 3/14. Diagnosis: levothyroxine  NOHEMI Stone CNP on 1/31/2023 at 3:14 PM

## 2023-02-01 NOTE — PROGRESS NOTES
[facility]  INITIAL VISIT NOTE  February 2, 2023    PRIMARY CARE PROVIDER AND CLINIC:  Be Dumont VIVIENCYNTHIA [facility] / [facility] [id number]    CHIEF COMPLAINT:  Hospital follow-up/Initial visit    HPI:    Marie Kline is a 89 year old  (4/28/1932) female who was seen at [facility] on February 2, 2023 for an initial visit.     Medical history is notable for cognitive impairment, oxygen dependent COPD, hypertension, stress cardiomyopathy, hypothyroidism, bladder cancer, CLL, chronic anemia, COVID-19 pneumonia, anxiety, and osteoarthritis.    Summary of hospital course:  Patient was hospitalized at [facility] from January 25 through January 29, 2023 for community-acquired pneumonia and acute on chronic hypoxic respiratory failure.  She originally presented with fever and worsening hypoxia.  EKG showed normal sinus rhythm with first-degree AV block.  CT chest with contrast demonstrated a small area of consolidation with scattered areas of mucous plugging within the left lower lobe, likely representing infection, moderate upper lobe predominant emphysema, mildly enlarged mediastinal lymph nodes and splenomegaly, and no pulmonary embolism.  CBC was remarkable for elevated white count of 79.7 and hemoglobin of 9.1.  UA was unremarkable and screening for COVID-19, influenza A/B, and RSV was negative.  Procalcitonin level was 0.15 and high-sensitivity troponin I was 35.  Inpatient, she was treated with levofloxacin and started on Acapella for secretions.  TCU was recommended per therapies.    Patient is admitted to this facility for medical management, nursing care, and rehab.     Of note, history was obtained from patient, facility RN, and extensive review of the chart.    Today's visit:  Patient was seen in her room, while lying in bed.  She appears extremity frail but in no acute distress.  She continues to have dyspnea with minimal exertion.  She is currently on  oxygen at 2 L/min which is her baseline.  She reports cough productive of yellowish sputum but denies fever, chills, chest pain, palpitation, nausea, vomiting, abdominal pain, or urinary symptoms.  She had a bowel movement yesterday.      CODE STATUS:   DNR / DNI    PAST MEDICAL HISTORY:   LLL CAP in January 2023  COPD, oxygen dependent, on O2 at 2 L/min continuously  Hypertension  Stress cardiomyopathy (LVEF 30-35% in December 2018, recovered, last LVEF 60-65% in March 2021; no significant CAD per angiogram in December 2018)  Mild mitral and tricuspid regurgitation  Hypothyroidism  Bladder cancer, s/p TURBT  CLL; on IVIG since 2004; she follows with Dr. Ivory at [facility]  Chronic anemia, baseline Hgb 8-9  Thrombocytopenia  COVID-19 pneumonia in September 2021  Anxiety  Osteoarthritis  Malnutrition  Cognitive impairment (SLUMS 16/30 in August 2022)    Past Medical History:   Diagnosis Date     Arthritis     Generalized     Bladder cancer (H)      CLL (chronic lymphocytic leukemia) (H)      Cognitive impairment 08/2022 16/30 slums     Congestive heart failure (H) 10/24/2014    flash pulm edema associated w/Takotsubo     COPD (chronic obstructive pulmonary disease) (H)     chronic O2 2L/NC     Dysphagia     on DD3     Hypertension      Hypothyroid      Mumps      Pulmonary edema cardiac cause (H) 10/08/2014    associated w/Takotsubo ACS     Stress-induced cardiomyopathy 10/2018    cath with minimal dz,  EF 30-35 %improved to 60-65 % by echo 3/2021     Tricuspid valve disorders, 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    TR 2-3+ per echo       PAST SURGICAL HISTORY:   Past Surgical History:   Procedure Laterality Date     BIOPSY LYMPH NODE INGUINAL Right 10/23/2018    Procedure: excsional biopsy right inguinal lymph node;  Surgeon: Reji Connors MD;  Location: [facility] OR     BLADDER SURGERY       CORONARY ANGIOGRAPHY ADULT ORDER  10/2014    minimal CAD     CV LEFT HEART CATH N/A 12/11/2018    Procedure: Left Heart  Cath;  Surgeon: Sadiq Carrasco MD;  Location:  HEART CARDIAC CATH LAB     CYSTOSCOPY       CYSTOSCOPY, BIOPSY BLADDER, COMBINED N/A 2016    Procedure: COMBINED CYSTOSCOPY, BIOPSY BLADDER;  Surgeon: Kar Nuñez MD;  Location: [facility] OR     CYSTOSCOPY, TRANSURETHRAL RESECTION (TUR) TUMOR BLADDER, COMBINED N/A 2016    Procedure: COMBINED CYSTOSCOPY, TRANSURETHRAL RESECTION (TUR) TUMOR BLADDER;  Surgeon: Kar Nuñez MD;  Location: [facility] OR     ESOPHAGOSCOPY, GASTROSCOPY, DUODENOSCOPY (EGD), COMBINED N/A 2016    Procedure: COMBINED ESOPHAGOSCOPY, GASTROSCOPY, DUODENOSCOPY (EGD);  Surgeon: Freddie Diez MD;  Location:  GI     OPEN REDUCTION INTERNAL FIXATION HIP BIPOLAR Left 2018    Procedure: Left bipolar hemiarthroplasty;  Surgeon: Scar Reynolds MD;  Location:  OR     OPEN REDUCTION INTERNAL FIXATION HIP UNIPOLAR Right 2022    Procedure: Cemented hemiarthroplasty, right hip.;  Surgeon: Adonis Ferguson MD;  Location:  OR       FAMILY HISTORY:   Family History   Problem Relation Age of Onset     Cerebrovascular Disease Mother      Cancer Father        SOCIAL HISTORY:  Social History     Tobacco Use     Smoking status: Former     Types: Cigarettes     Quit date: 2/3/1996     Years since quittin.0     Smokeless tobacco: Never   Substance Use Topics     Alcohol use: No     Alcohol/week: 0.0 standard drinks       MEDICATIONS:  Current Outpatient Medications   Medication Sig Dispense Refill     ACE/ARB/ARNI NOT PRESCRIBED (INTENTIONAL) Please choose reason not prescribed from choices below.       acetaminophen (TYLENOL) 325 MG tablet May take 2 tablets (650 mg) by mouth every 6 hours as needed for fever or pain.       albuterol (PROAIR HFA/PROVENTIL HFA/VENTOLIN HFA) 108 (90 Base) MCG/ACT inhaler Inhale 1-2 puffs into the lungs every 6 hours as needed 18 g 11     bisacodyl (DULCOLAX) 10 MG suppository Place 1 suppository (10 mg) rectally daily as needed  for constipation       budesonide (PULMICORT) 0.5 MG/2ML neb solution Take 2 mLs (0.5 mg) by nebulization 2 times daily 120 mL 11     escitalopram (LEXAPRO) 10 MG tablet Take 1 tablet (10 mg) by mouth daily 90 tablet 1     ferrous sulfate (IRON) 325 (65 FE) MG tablet Take 325 mg by mouth daily (with breakfast)        furosemide (LASIX) 20 MG tablet Take 20 mg by mouth daily       Immune Globulin, Human, (OCTAGAM) 5 GM/100ML SOLN into the vein every 6 weeks.    Hold this medication while in TCU-resume at discharge from TCU (Patient not taking: Reported on 1/25/2023)       levofloxacin (LEVAQUIN) 250 MG tablet Take 1 tablet (250 mg) by mouth daily for 4 doses 4 tablet 0     levothyroxine (SYNTHROID/LEVOTHROID) 88 MCG tablet Take 1 tablet (88 mcg) by mouth daily       metoprolol succinate ER (TOPROL XL) 25 MG 24 hr tablet Take 1 tablet (25 mg) by mouth daily 90 tablet 2     Multiple Vitamins-Minerals (MULTIVITAMIN ADULT) CHEW Take 2 chew tab by mouth daily       Revefenacin 175 MCG/3ML SOLN Inhale 3 mLs (175 mcg) into the lungs daily (Patient not taking: Reported on 1/25/2023) 90 mL 11     senna-docusate (SENOKOT-S/PERICOLACE) 8.6-50 MG tablet Take 1 tablet by mouth 2 times daily as needed for constipation 30 tablet 0     traZODone (DESYREL) 50 MG tablet TAKE TWO TABLETS BY MOUTH DAILY AT BEDTIME AS NEEDED FOR SLEEP 180 tablet 1       MED REC REQUIRED  Post Medication Reconciliation Status: medication reconcilation previously completed during another office visit      ALLERGIES:  Allergies   Allergen Reactions     Diagnostic X-Ray Materials Hives     CT DYE     Contrast Dye Hives     CT DYE     Prolia [Denosumab]      Osteonecrosis jaw       Rocephin [Ceftriaxone] Itching     Wound Dressing Adhesive        ROS:  10 point ROS were negative other than the symptoms noted above in the HPI.    PHYSICAL EXAM:  Vital signs were reviewed in the chart.  Vital Signs: /70   Pulse 79   Temp 97.8  F (36.6  C)   Resp 16    "Ht 1.499 m (4' 11\")   Wt 40.3 kg (88 lb 12.8 oz)   LMP  (LMP Unknown)   SpO2 92%   BMI 17.94 kg/m    General: Extremely frail appearing but comfortable and in no acute distress  HEENT: Conjunctival pallor, no scleral icterus or injection, moist oral mucosa  Cardiovascular: Normal S1, S2, RRR  Respiratory: Clear to auscultation on anterior chest exam, no wheezing, rhonchi, or crackles  GI: Abdomen soft, non-tender, non-distended, +BS  Extremities: No LE edema  Neuro: CX II-XII grossly intact; ROM in all four extremities grossly intact  Psych: Alert and oriented almost x3; normal affect  Skin: No acute rash    LABORATORY/IMAGING DATA:  All relevant labs and imaging data in [facility] and/or Care Everywhere were personally reviewed today.      Most Recent 3 CBC's:Recent Labs   Lab Test 01/31/23  0834 01/28/23  0623 01/26/23  0805 01/25/23  1509   WBC 75.2*  --  114.1* 79.7*   HGB 8.1*  --  9.9* 9.1*   MCV 97  --  96 96   * 92* 91* 69*     Most Recent 3 BMP's:Recent Labs   Lab Test 01/31/23  0834 01/28/23  0623 01/27/23  0659 01/26/23 2123 01/26/23  0805     --  137  --  136   POTASSIUM 4.4  --  4.0 5.4* 5.6*   CHLORIDE 103  --  98  --  98   CO2 36*  --  30*  --  30*   BUN 13.1  --  14.1  --  14.8   CR 0.78 0.88 0.83  --  0.75   ANIONGAP 4*  --  9  --  8   CARLOS 8.9  --  8.5  --  8.7   GLC 98  --  97  --  114*     Most Recent 2 LFT's:Recent Labs   Lab Test 01/25/23  1509 08/26/22  1226   AST 23 32   ALT 11 13   ALKPHOS 93 81   BILITOTAL 0.4 0.3     Most Recent TSH and T4:Recent Labs   Lab Test 01/31/23  0834 05/16/22  1701   TSH 0.93 12.93*   T4  --  1.02         ASSESSMENT/PLAN:  LLL CAP,  Mucous plugging,  COPD,  Chronic hypoxic respiratory failure.  Oxygen dependent, on O2 at 2 L/min continuously.  She was treated with levofloxacin.  She now appears to be at her baseline respiratory status.  Plan:  Continue supplemental oxygen at 2 L/min  Continue levofloxacin 250 mg daily through February 3, 2023  Continue " budesonide 0.5 mg inhalation twice daily  Continue albuterol 108 mcg, 1 puff every 6 hours as needed  Continue to hold PTA revenfenacin in TCU and resume at discharge  Monitor respiratory status     Essential hypertension.  History of stress cardiomyopathy (LVEF 30-35% in December 2018, recovered, last LVEF 60-65% in March 2021; no significant CAD per angiogram in December 2018),  Mild mitral and tricuspid regurgitation.  Blood pressure remains fairly controlled.  She currently weighs 88.8 LBS and appears euvolemic.  Plan:  Continue metoprolol ER 25 mg daily  Continue furosemide 20 mg daily  Continue to monitor blood pressure, weight, volume status     CLL,  Chronic anemia (baseline Hgb 8-9 lately),  Thrombocytopenia.  On IVIG since 2004; she follows with Dr. Ivory at [facility]  Last CBC on January 31 with white count of 75.2, hemoglobin of 8.1, and platelet count of 140,000.  Plan:  Continue ferrous sulfate 325 mg daily  Transfuse PRN for hemoglobin less than 7  Monitor CBC PRN  Follow-up with hematology/oncology as directed     Hypothyroidism.  TSH of 0.93 on January 31, 2023 and levothyroxine dose was reduced from 100 mcg to 88 mcg daily.  Plan:  Continue levothyroxine 88 mcg daily  Recheck TSH on March 14 as ordered     History of bladder cancer, s/p TURBT.  Plan:  Follow-up as outpatient     Anxiety,  Insomnia.  Stable.  Plan:  Continue escitalopram 10 mg daily  Continue trazodone 100 mg nightly PRN     Moderate malnutrition.  BMI 17.9.  Plan:  Continue nutritional supplement     Cognitive impairment.  SLUMS score 16/30 in August 2022.  On today's exam she is oriented almost x3.  Plan:  Formal cognitive evaluation per OT for safe discharge planning     Physical deconditioning.  Plan:  Continue PT/OT evaluation and therapy          Orders written by provider at facility:  None.          Disclaimer: This note may contain text created using speech-recognition software and may contain unintended word  substitutions.      Electronically signed by:  Manuela Hannon MD

## 2023-02-03 NOTE — PROGRESS NOTES
Orders  Marie Kline  4/28/1932  GFR 72  1) Start paxlovid normal renal function pack. Take 3 tabs po BID x 5 days for COVID 19 (escript sent to pharmacy)  2) Hold trazodone starting now and through 3 days after paxlovid therapy, then resume  3) BMP, CBC 2/6. Diagnosis: COVID 19  4) Melatonin 3 mg at bedtime PRN. Diagnosis: insomnia  5) Update family with any change in condition  NOHEMI Stone CNP on 2/3/2023 at 11:42 AM

## 2023-02-06 NOTE — PROGRESS NOTES
[facility]IATR[facility]    Chief Complaint   Patient presents with     RECHECK     HPI:  Marie Kline is a 89 year old  (4/28/1932), who is being seen today for an episodic care visit at: [facility]  ([facility]) [[id number]].     Past medical history significant for coronary artery disease, hypertension, stress cardiomyopathy, COPD on chronic 2L of oxygen, chronic anemia, CLL, bladder cancer, cognitive impairment, LESLY, hypothyroidism.     Summary of recent hospitalization:  Patient was admitted at [facility] from 1/25 to 1/29/2023 for pneumonia, acute on chronic hypoxemic respiratory failure.  She presented to the emergency department for evaluation of hypoxia, shortness of breath.  Laboratory evaluation significant for CO2 33, lactic acid 0.5, troponin high-sensitivity 35, D-dimer 1.18, white blood cell count 79.7, hemoglobin 9.1, platelet count 69.  Influenza AMB, RSV, COVID-19 screening negative.  Blood cultures negative.  CT chest PE shows no pulmonary embolism, small areas of consolidation with scattered areas of mucous plugging within the left lower lobe, likely representing infection, moderate upper lobe predominant emphysema, mildly enlarged mediastinal lymph node and splenomegaly, likely related to patient's known CLL.  Chest x-ray shows stable cardiomediastinal silhouette with likely persistent mediastinal lymphadenopathy, most pronounced in the AP window, stable emphysematous changes and scarring without new airspace consolidation, pleural effusion, pneumothorax, no acute bony abnormality.  She was admitted for pneumonia and chronic mucous plugging and was started on Levaquin and to complete 7 day coursee. Started on acapella for secretions. Procal elevated 0.15. Discharged to [facility] for physical rehabilitation and medical management.        Today's concern is: Patient was seen today for routine follow-up in the [facility].  Patient was found to be positive for COVID-19 on 2/2/2023 via routine  "screening in TCU.  Marie continues on 2 L of oxygen via nasal cannula, which is what she uses at baseline.  She denies any worsening shortness of breath.  She continues to have productive cough that has been ongoing since admission to the TCU.  She denies fever/chills.  She denies dizziness/lightheadedness, chest pain.  She reports bowels moving regularly.  She denies dysuria/voiding.  She continues to work with therapy.    Allergies, and PMH/PSH reviewed in [facility] today.  REVIEW OF SYSTEMS:  7 point ROS of systems including Constitutional,  Respiratory, Cardiovascular, Gastroenterology, Genitourinary, Musculoskeletal, Psychiatric were all negative except for pertinent positives noted in my HPI.    Objective:   BP (!) 143/72   Pulse 70   Temp 97.6  F (36.4  C)   Resp 18   Ht 1.499 m (4' 11\")   Wt 40.3 kg (88 lb 12.8 oz)   LMP  (LMP Unknown)   SpO2 92%   BMI 17.94 kg/m    GENERAL APPEARANCE:  Alert, in NAD  HEENT: normocephalic, moist mucous membranes, nose without drainage or crusting  RESP:  respiratory effort normal, no respiratory distress, Lung sounds clear, patient is on 2L via NC  CV: auscultation of heart done, rate and rhythm regular.  ABDOMEN: + bowel sounds, soft, nontender, no grimacing or guarding with palpation.  M/S: no lower extremity edema  NEURO: cranial nerves 2-12 grossly intact and at patient's baseline; moves extremities freely  PSYCH: affect and mood normal      Labs done in SNF are in [place] [facility]. Please refer to them using [facility]/Care Everywhere. and Recent labs in [facility] reviewed by me today.     Assessment/Plan:  COVID 19 infection  -found to be positive via routine screening at TCU on 2/2/2023  -she has received 4 covid vaccines, including most recent Bivalent vaccine  -she was started on paxlovid on 2/3, as patient is very high risk for severe disease due to being on chronic oxygen, with very recent CAP and chronic COPD- had productive cough at that time  -respiratory status is " stable, she is afebrile and hemodynamically stable  -Patient is symptomatic and in the contagious stage  Plan: Continue supportive care. Start guaifenesin PRN. Continue paxlovid x 5 days through 2/8 then stop.  Continue transmission based precautions in single room isolation for 10 days from positive test    Community acquired pneumonia, resolved  Mucous plugging  Chronic COPD  chronic hypoxemic respiratory failure  -completed levaquin inpatient  -breathing is at baseline, continues to have productive cough  -is on baseline chronic oxygen today at 2L via NC and saturations 92-95%  -afebrile and hemodynamically stable  Plan: Continue albuterol 2 puffs every 6 hours as needed, budesonide twice daily.  Hold revefenacin in the TCU and resume at discharge.  Continue oxygen 2 L via nasal cannula.Monitor respiratory status.     Essential hypertension   History of stress cardiomyopathy, LVEF 30-35% in 12/2018 and is no recovered LVEF 60-65% 3/2021 and no significant CAD per angiogram in 12/2018  Mild 1+ mitral regurgitation per ECHO 3/2021   Mild 1+ tricsupid regurgitation per ECHO 3/2021   Mild 1+ pulmonic regurgitation per ECHO 3/2021   Mild 1+ aortic regurgitation per ECHO 3/2021   Chronic  -BP fair control, appears euvolemic  Plan: Continue metoprolol ER 25 mg daily, lasix 20 mg daily. [facility] 2/7/2023. Daily weights. Notify provider with weight gain >2 lbs in a day, >5 lbs in on week. 2 gram Na diet. Follow up with cardiology per recommendations. VS per policy. Adjust medications as needed.     CLL  Chronic anemia  Thrombocytopenia  Leukocytosis,  range at baseline  -follows with Dr. Ivory at [facility]  -baseline hemoglobin 8-9 lately  -baseline platelet 50-normal range  -WBC 75.2, hemoglobin 8.1, platelet count 140,000 on 1/31/2023  Plan: CBC 2/7. Continue ferrous sulfate 325 mg daily. Follow-up with oncology per recommendation     Dysphagia  Chronic  Plan: Continue mechanical soft diet.     Hypothyroidism  -TSH  12.93 on 5/16/2022  Plan: Continue levothyroxine 100 mcg daily. TSH 1/31/2023     History of bladder cancer status post TURBT  Plan: Follow up outpatient     Pain  -did not use tramadol inpatient, tramadol discontinued in TCU  -no reports of pain today  Plan: Continue Tylenol 650 mg every 6 hours as needed. Monitor pain.     Generalized anxiety disorder  Insomnia  Chronic  -PTA trazodone 100 mg at bedtime PRN on hold until 3 days after completing paxlovid  Plan: Continue melatonin 3 mg at bedtime PRN, escitalopram 10 mg p.o. daily. Resume trazodone 3 days after paxlovid complete.  Monitor mood and symptoms.  Consider ACP referral.     Slow transit constipation  -bowels moving regularly  Plan: Discontinue loperamide. Continue bisacodyl 10 mg daily as needed, senna s 1 tab BID PRN. Monitor bowels.     Moderate malnutrition  -per inpatient dietician assessment  Wt Readings from Last 4 Encounters:   02/06/23 40.3 kg (88 lb 12.8 oz)   02/01/23 40.3 kg (88 lb 12.8 oz)   01/30/23 40.9 kg (90 lb 3.2 oz)   01/27/23 38.4 kg (84 lb 9.6 oz)     Plan: Continue multivitamin daily. Dietician following. Supplements per dietician. Monitor weights and po intake     Cognitive impairment  -SLUMS 16/30 during TCU stay here in 8/2022  -SLUMS from this stay 25/30 and CPT 4.6/5.6  Plan:  Based on cognitive testing okay to live alone but on the border of [facility] appropriate, assist with higher level IADLs like medications and finances. Nursing to assist with cares, meals, medication assistance, activities.     Physical deconditioning  Comment: Acute, secondary to recent hospitalization, medical conditions as above  -continues to work with therapy  Plan: Encourage participation in physical therapy/occupational therapy for strengthening and deconditioning. Discharge planning per their recommendation. Social work to assist with d/c planning.        MED REC REQUIRED{  Post Medication Reconciliation Status:  Medication reconciliation previously  completed during another office visit      Disclaimer: This note may contain text created using speech-recognition software and may contain unintended word substitutions.       Electronically signed by: NOHEMI Stone CNP

## 2023-02-06 NOTE — PATIENT INSTRUCTIONS
Orders  Marie Kline  4/28/1932  1) Start guaifenesin 20 mg/ ml. Take 10 ml q4h PRN for cough.  2) BMP, CBC 2/7. Diagnosis: anemia, HTN  Usha Aragon, APRN CNP on 2/6/2023 at 1:19 PM

## 2023-02-14 NOTE — PROGRESS NOTES
[facility] GERIATRIC SERVICES    Facility:   [facility]  ([facility]) [[id number]]   Code Status: DNR/DNI      CHIEF COMPLAINT/REASON FOR VISIT:  Chief Complaint   Patient presents with     Discharge Summary Nursing Home       HISTORY:      HPI: Marie is a 89 year old female who was hospitalized January 25, 2023 through January 29, 2023 secondary to worsening hypoxia and a fever and treatment for left lower lobe pneumonia.  She does have a history of CLL with chronic leukocytosis with associated anemia/thrombocytopenia along with a history of CAD, hypertension, chronic hypoxic respiratory failure, COPD, bladder cancer status post TURBT, hypothyroidism, mild dysphagia.  She did report having a chronic cough which was occasionally productive of white to yellow sputum.  Denied feeling shortness of breath she did have decrease in saturations her oxygen was turned up to 3 L and titrated back down to her normal 1-2 L per nasal cannula.  No pulmonary embolism.  There were small areas of consolidation with mucous plugging on the left lower lobe and moderate bilateral upper lobe emphysema treated with levofloxacin for the treatment of community-acquired pneumonia.  Transferred to transitional care unit.  Other issues addressed in the hospital was cachexia along with severe protein malnutrition.  Also mild dysphagia which was stable continue with mechanical soft diet.  Briefly she has now been in the transitional care unit to which I have the opportunity to discharge her to home with an approximated discharge date of February 15, 2023.  We did talk about her stay in the transitional care unit as well as her current medications.  She will be discharging with various services.  She is not having any discomfort did take a Tylenol dose as needed on February 11 prior to that February 3 her last albuterol neb as needed was on February 3 she does have Pulmicort twice daily.  Getting extra nutrient supplements or Ensure twice  daily.  Also Magic cup in the evening time.  For sleep is on melatonin 3 mg.  She is excited to be able to go home to her independent living facility.  Does use oxygen and does have home oxygen and does use a four-wheel walker.    Past Medical History:   Diagnosis Date     Arthritis     Generalized     Bladder cancer (H)      CLL (chronic lymphocytic leukemia) (H)      Cognitive impairment 2022    16/30 slums     Congestive heart failure (H) 10/24/2014    flash pulm edema associated w/Takotsubo     COPD (chronic obstructive pulmonary disease) (H)     chronic O2 2L/NC     Dysphagia     on DD3     Hypertension      Hypothyroid      Mumps      Pulmonary edema cardiac cause (H) 10/08/2014    associated w/Takotsubo ACS     Stress-induced cardiomyopathy 10/2018    cath with minimal dz,  EF 30-35 %improved to 60-65 % by echo 3/2021     Tricuspid valve disorders, 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    TR 2-3+ per echo            Family History   Problem Relation Age of Onset     Cerebrovascular Disease Mother      Cancer Father       Social History     Socioeconomic History     Marital status:    Tobacco Use     Smoking status: Former     Types: Cigarettes     Quit date: 2/3/1996     Years since quittin.0     Smokeless tobacco: Never   Vaping Use     Vaping Use: Never used   Substance and Sexual Activity     Alcohol use: No     Alcohol/week: 0.0 standard drinks     Drug use: No     Sexual activity: Not Currently     Partners: Male   Other Topics Concern     Caffeine Concern No     Comment: 1/2-2 cups daily     Special Diet Yes     Comment: no added salt, no dairy, more gatorade     Exercise No     Comment: walking her dog 5 times per day - none recently     Parent/sibling w/ CABG, MI or angioplasty before 65F 55M? No        REVIEW OF SYSTEM:  She currently denies any new symptoms of a cough or cold sore throat postnasal drip shortness of breath dyspnea chest pain dizziness vertigo nausea vomiting diarrhea  dysuria frequency urgency myalgias arthralgias or headache.    PHYSICAL EXAM:   Pleasant female in no acute distress.  Head is normocephalic.  Conjunctiva is pink and sclera is clear.  Lung sounds overall are clear she does have some chronic rhonchi at the bases.  No cough.  He is on oxygen.  Cardiovascular S1-S2 regular rate and rhythm and no lower extremity edema.  Gastrointestinal thin build nontender.  Musculoskeletal is able to use her walker.  Denies discomfort.  Psychiatric: Pleasant affect    Current Outpatient Medications:      ACE/ARB/ARNI NOT PRESCRIBED (INTENTIONAL), Please choose reason not prescribed from choices below., Disp: , Rfl:      acetaminophen (TYLENOL) 325 MG tablet, May take 2 tablets (650 mg) by mouth every 6 hours as needed for fever or pain., Disp: , Rfl:      albuterol (PROAIR HFA/PROVENTIL HFA/VENTOLIN HFA) 108 (90 Base) MCG/ACT inhaler, Inhale 1-2 puffs into the lungs every 6 hours as needed, Disp: 18 g, Rfl: 11     bisacodyl (DULCOLAX) 10 MG suppository, Place 1 suppository (10 mg) rectally daily as needed for constipation, Disp: , Rfl:      budesonide (PULMICORT) 0.5 MG/2ML neb solution, Take 2 mLs (0.5 mg) by nebulization 2 times daily, Disp: 120 mL, Rfl: 11     escitalopram (LEXAPRO) 10 MG tablet, Take 1 tablet (10 mg) by mouth daily, Disp: 90 tablet, Rfl: 1     ferrous sulfate (IRON) 325 (65 FE) MG tablet, Take 325 mg by mouth daily (with breakfast) , Disp: , Rfl:      furosemide (LASIX) 20 MG tablet, Take 20 mg by mouth daily, Disp: , Rfl:      Immune Globulin, Human, (OCTAGAM) 5 GM/100ML SOLN, into the vein every 6 weeks.  Hold this medication while in TCU-resume at discharge from TCU (Patient not taking: Reported on 1/25/2023), Disp: , Rfl:      levothyroxine (SYNTHROID/LEVOTHROID) 88 MCG tablet, Take 1 tablet (88 mcg) by mouth daily, Disp: , Rfl:      loperamide (IMODIUM A-D) 2 MG tablet, Take 2 mg by mouth 2 times daily as needed, Disp: , Rfl:      melatonin 3 MG tablet, Take  1 tablet (3 mg) by mouth nightly as needed for sleep, Disp: , Rfl:      metoprolol succinate ER (TOPROL XL) 25 MG 24 hr tablet, Take 1 tablet (25 mg) by mouth daily, Disp: 90 tablet, Rfl: 2     Multiple Vitamins-Minerals (MULTIVITAMIN ADULT) CHEW, Take 2 chew tab by mouth daily, Disp: , Rfl:      Revefenacin 175 MCG/3ML SOLN, Inhale 3 mLs (175 mcg) into the lungs daily (Patient not taking: Reported on 1/25/2023), Disp: 90 mL, Rfl: 11     senna-docusate (SENOKOT-S/PERICOLACE) 8.6-50 MG tablet, Take 1 tablet by mouth 2 times daily as needed for constipation, Disp: 30 tablet, Rfl: 0    /59   Pulse 67   Temp 97.7  F (36.5  C)   Resp 18   Wt 37.7 kg (83 lb 3.2 oz)   LMP  (LMP Unknown)   SpO2 95%   BMI 16.80 kg/m        LABS:   Last Comprehensive Metabolic Panel:  Sodium   Date Value Ref Range Status   01/31/2023 143 136 - 145 mmol/L Final   07/07/2021 138 133 - 144 mmol/L Final     Potassium   Date Value Ref Range Status   01/31/2023 4.4 3.4 - 5.3 mmol/L Final   05/16/2022 4.3 3.4 - 5.3 mmol/L Final   07/07/2021 3.9 3.4 - 5.3 mmol/L Final     Chloride   Date Value Ref Range Status   01/31/2023 103 98 - 107 mmol/L Final   05/16/2022 99 94 - 109 mmol/L Final   07/07/2021 102 94 - 109 mmol/L Final     Carbon Dioxide   Date Value Ref Range Status   07/07/2021 35 (H) 20 - 32 mmol/L Final     Carbon Dioxide (CO2)   Date Value Ref Range Status   01/31/2023 36 (H) 22 - 29 mmol/L Final   05/16/2022 30 20 - 32 mmol/L Final     Anion Gap   Date Value Ref Range Status   01/31/2023 4 (L) 7 - 15 mmol/L Final   05/16/2022 4 3 - 14 mmol/L Final   07/07/2021 1 (L) 3 - 14 mmol/L Final     Glucose   Date Value Ref Range Status   01/31/2023 98 70 - 99 mg/dL Final   05/16/2022 98 70 - 99 mg/dL Final   07/07/2021 82 70 - 99 mg/dL Final     GLUCOSE BY METER POCT   Date Value Ref Range Status   08/27/2022 137 (H) 70 - 99 mg/dL Final     Urea Nitrogen   Date Value Ref Range Status   01/31/2023 13.1 8.0 - 23.0 mg/dL Final    05/16/2022 15 7 - 30 mg/dL Final   07/07/2021 22 7 - 30 mg/dL Final     Creatinine   Date Value Ref Range Status   01/31/2023 0.78 0.51 - 0.95 mg/dL Final   07/07/2021 0.78 0.52 - 1.04 mg/dL Final     GFR Estimate   Date Value Ref Range Status   01/31/2023 72 >60 mL/min/1.73m2 Final     Comment:     eGFR calculated using 2021 CKD-EPI equation.   07/07/2021 68 >60 mL/min/[1.73_m2] Final     Comment:     Non  GFR Calc  Starting 12/18/2018, serum creatinine based estimated GFR (eGFR) will be   calculated using the Chronic Kidney Disease Epidemiology Collaboration   (CKD-EPI) equation.       Calcium   Date Value Ref Range Status   01/31/2023 8.9 8.2 - 9.6 mg/dL Final   07/07/2021 9.1 8.5 - 10.1 mg/dL Final     CBC RESULTS: Recent Labs   Lab Test 01/31/23  0834   WBC 75.2*   RBC 2.84*   HGB 8.1*   HCT 27.5*   MCV 97   MCH 28.5   MCHC 29.5*   RDW 16.5*   *     TSH   Date Value Ref Range Status   01/31/2023 0.93 0.30 - 4.20 uIU/mL Final   05/16/2022 12.93 (H) 0.40 - 4.00 mU/L Final   03/24/2021 0.81 0.40 - 4.00 mU/L Final     T4 Free   Date Value Ref Range Status   12/09/2018 1.15 0.76 - 1.46 ng/dL Final     Free T4   Date Value Ref Range Status   05/16/2022 1.02 0.76 - 1.46 ng/dL Final         ASSESSMENT:    Encounter Diagnoses   Name Primary?     COPD exacerbation (H) Yes     Community acquired pneumonia, unspecified laterality      Hypothyroidism, unspecified type      Chronic congestive heart failure, unspecified heart failure type (H)        MEDICAL EQUIPMENT NEEDS:  None.  She does have home oxygen    DISCHARGE PLAN/FACE TO FACE:  I certify that services are/were furnished while this patient was under the care of a physician and that a physician or an allowed non-physician practitioner (NPP), had a face-to-face encounter that meets the physician face-to-face encounter requirements. The encounter was in whole, or in part, related to the primary reason for home health. The patient is confined  to his/her home and needs intermittent skilled nursing, physical therapy, speech-language pathology, or the continued need for occupational therapy. A plan of care has been established by a physician and is periodically reviewed by a physician.  Date of Face-to-Face Encounter: February 14, 2023    I certify that, based on my findings, the following services are medically necessary home health services: She will be discharging to home with current medications as well as physical and occupational therapy home health aide and nursing with an approximated discharge date of February 15, 2023    My clinical findings support the need for the above skilled services because: (Please write a brief narrative summary that describes what the RN, PT, SLP, or other services will be doing in the home. A list of diagnoses in this section does not meet the CMS requirements.)  She will require the skilled services secondary to her chronic medical conditions along with recent hospitalization and stay in the transitional care unit but also continuation of the rehabilitation process and home safety along with nursing for medication management    This patient is homebound because: (Please write a brief narrative summary describing the functional limitations as to why this patient is homebound and specifically what makes this patient homebound.)  Secondary to chronic medical conditions including her recent hospitalization but also continuation of assessment of her ADLs, rehabilitation, strengthening and nursing for medication management    The patient is, or has been, under my care and I have initiated the establishment of the plan of care. This patient will be followed by a physician who will periodically review the plan of care.    Schedule follow up visit with primary care provider within 7 days to reestablish care.  She will follow-up with her primary care doctor regarding medication management as well as any future laboratory studies.   She already has home oxygen set up.  I had a chance to go over her laboratory studies as well as her current medications.  She feels comfortable going home and did feel comfortable working through the rehabilitation process.  She did not have any other questions.    Discharge coordination of care greater than 30 minutes    Electronically signed by: Sadiq Richards NP

## 2023-02-14 NOTE — LETTER
2/14/2023        RE: Marie Kline  [address]  [facility] [address]        [facility]    Facility:   [facility]  ([facility]) [[id number]]   Code Status: DNR/DNI      CHIEF COMPLAINT/REASON FOR VISIT:  Chief Complaint   Patient presents with     Discharge Summary Nursing Home       HISTORY:      HPI: Marie is a 89 year old female who was hospitalized January 25, 2023 through January 29, 2023 secondary to worsening hypoxia and a fever and treatment for left lower lobe pneumonia.  She does have a history of CLL with chronic leukocytosis with associated anemia/thrombocytopenia along with a history of CAD, hypertension, chronic hypoxic respiratory failure, COPD, bladder cancer status post TURBT, hypothyroidism, mild dysphagia.  She did report having a chronic cough which was occasionally productive of white to yellow sputum.  Denied feeling shortness of breath she did have decrease in saturations her oxygen was turned up to 3 L and titrated back down to her normal 1-2 L per nasal cannula.  No pulmonary embolism.  There were small areas of consolidation with mucous plugging on the left lower lobe and moderate bilateral upper lobe emphysema treated with levofloxacin for the treatment of community-acquired pneumonia.  Transferred to transitional care unit.  Other issues addressed in the hospital was cachexia along with severe protein malnutrition.  Also mild dysphagia which was stable continue with mechanical soft diet.  Briefly she has now been in the transitional care unit to which I have the opportunity to discharge her to home with an approximated discharge date of February 15, 2023.  We did talk about her stay in the transitional care unit as well as her current medications.  She will be discharging with various services.  She is not having any discomfort did take a Tylenol dose as needed on February 11 prior to that February 3 her last albuterol neb as needed was on  February 3 she does have Pulmicort twice daily.  Getting extra nutrient supplements or Ensure twice daily.  Also Magic cup in the evening time.  For sleep is on melatonin 3 mg.  She is excited to be able to go home to her independent living facility.  Does use oxygen and does have home oxygen and does use a four-wheel walker.    Past Medical History:   Diagnosis Date     Arthritis     Generalized     Bladder cancer (H)      CLL (chronic lymphocytic leukemia) (H)      Cognitive impairment 2022 slums     Congestive heart failure (H) 10/24/2014    flash pulm edema associated w/Takotsubo     COPD (chronic obstructive pulmonary disease) (H)     chronic O2 2L/NC     Dysphagia     on DD3     Hypertension      Hypothyroid      Mumps      Pulmonary edema cardiac cause (H) 10/08/2014    associated w/Takotsubo ACS     Stress-induced cardiomyopathy 10/2018    cath with minimal dz,  EF 30-35 %improved to 60-65 % by echo 3/2021     Tricuspid valve disorders, 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    TR 2-3+ per echo            Family History   Problem Relation Age of Onset     Cerebrovascular Disease Mother      Cancer Father       Social History     Socioeconomic History     Marital status:    Tobacco Use     Smoking status: Former     Types: Cigarettes     Quit date: 2/3/1996     Years since quittin.0     Smokeless tobacco: Never   Vaping Use     Vaping Use: Never used   Substance and Sexual Activity     Alcohol use: No     Alcohol/week: 0.0 standard drinks     Drug use: No     Sexual activity: Not Currently     Partners: Male   Other Topics Concern     Caffeine Concern No     Comment: 1/2-2 cups daily     Special Diet Yes     Comment: no added salt, no dairy, more gatorade     Exercise No     Comment: walking her dog 5 times per day - none recently     Parent/sibling w/ CABG, MI or angioplasty before 65F 55M? No        REVIEW OF SYSTEM:  She currently denies any new symptoms of a cough or cold sore throat  postnasal drip shortness of breath dyspnea chest pain dizziness vertigo nausea vomiting diarrhea dysuria frequency urgency myalgias arthralgias or headache.    PHYSICAL EXAM:   Pleasant female in no acute distress.  Head is normocephalic.  Conjunctiva is pink and sclera is clear.  Lung sounds overall are clear she does have some chronic rhonchi at the bases.  No cough.  He is on oxygen.  Cardiovascular S1-S2 regular rate and rhythm and no lower extremity edema.  Gastrointestinal thin build nontender.  Musculoskeletal is able to use her walker.  Denies discomfort.  Psychiatric: Pleasant affect    Current Outpatient Medications:      ACE/ARB/ARNI NOT PRESCRIBED (INTENTIONAL), Please choose reason not prescribed from choices below., Disp: , Rfl:      acetaminophen (TYLENOL) 325 MG tablet, May take 2 tablets (650 mg) by mouth every 6 hours as needed for fever or pain., Disp: , Rfl:      albuterol (PROAIR HFA/PROVENTIL HFA/VENTOLIN HFA) 108 (90 Base) MCG/ACT inhaler, Inhale 1-2 puffs into the lungs every 6 hours as needed, Disp: 18 g, Rfl: 11     bisacodyl (DULCOLAX) 10 MG suppository, Place 1 suppository (10 mg) rectally daily as needed for constipation, Disp: , Rfl:      budesonide (PULMICORT) 0.5 MG/2ML neb solution, Take 2 mLs (0.5 mg) by nebulization 2 times daily, Disp: 120 mL, Rfl: 11     escitalopram (LEXAPRO) 10 MG tablet, Take 1 tablet (10 mg) by mouth daily, Disp: 90 tablet, Rfl: 1     ferrous sulfate (IRON) 325 (65 FE) MG tablet, Take 325 mg by mouth daily (with breakfast) , Disp: , Rfl:      furosemide (LASIX) 20 MG tablet, Take 20 mg by mouth daily, Disp: , Rfl:      Immune Globulin, Human, (OCTAGAM) 5 GM/100ML SOLN, into the vein every 6 weeks.  Hold this medication while in TCU-resume at discharge from TCU (Patient not taking: Reported on 1/25/2023), Disp: , Rfl:      levothyroxine (SYNTHROID/LEVOTHROID) 88 MCG tablet, Take 1 tablet (88 mcg) by mouth daily, Disp: , Rfl:      loperamide (IMODIUM A-D) 2 MG  tablet, Take 2 mg by mouth 2 times daily as needed, Disp: , Rfl:      melatonin 3 MG tablet, Take 1 tablet (3 mg) by mouth nightly as needed for sleep, Disp: , Rfl:      metoprolol succinate ER (TOPROL XL) 25 MG 24 hr tablet, Take 1 tablet (25 mg) by mouth daily, Disp: 90 tablet, Rfl: 2     Multiple Vitamins-Minerals (MULTIVITAMIN ADULT) CHEW, Take 2 chew tab by mouth daily, Disp: , Rfl:      Revefenacin 175 MCG/3ML SOLN, Inhale 3 mLs (175 mcg) into the lungs daily (Patient not taking: Reported on 1/25/2023), Disp: 90 mL, Rfl: 11     senna-docusate (SENOKOT-S/PERICOLACE) 8.6-50 MG tablet, Take 1 tablet by mouth 2 times daily as needed for constipation, Disp: 30 tablet, Rfl: 0    /59   Pulse 67   Temp 97.7  F (36.5  C)   Resp 18   Wt 37.7 kg (83 lb 3.2 oz)   LMP  (LMP Unknown)   SpO2 95%   BMI 16.80 kg/m        LABS:   Last Comprehensive Metabolic Panel:  Sodium   Date Value Ref Range Status   01/31/2023 143 136 - 145 mmol/L Final   07/07/2021 138 133 - 144 mmol/L Final     Potassium   Date Value Ref Range Status   01/31/2023 4.4 3.4 - 5.3 mmol/L Final   05/16/2022 4.3 3.4 - 5.3 mmol/L Final   07/07/2021 3.9 3.4 - 5.3 mmol/L Final     Chloride   Date Value Ref Range Status   01/31/2023 103 98 - 107 mmol/L Final   05/16/2022 99 94 - 109 mmol/L Final   07/07/2021 102 94 - 109 mmol/L Final     Carbon Dioxide   Date Value Ref Range Status   07/07/2021 35 (H) 20 - 32 mmol/L Final     Carbon Dioxide (CO2)   Date Value Ref Range Status   01/31/2023 36 (H) 22 - 29 mmol/L Final   05/16/2022 30 20 - 32 mmol/L Final     Anion Gap   Date Value Ref Range Status   01/31/2023 4 (L) 7 - 15 mmol/L Final   05/16/2022 4 3 - 14 mmol/L Final   07/07/2021 1 (L) 3 - 14 mmol/L Final     Glucose   Date Value Ref Range Status   01/31/2023 98 70 - 99 mg/dL Final   05/16/2022 98 70 - 99 mg/dL Final   07/07/2021 82 70 - 99 mg/dL Final     GLUCOSE BY METER POCT   Date Value Ref Range Status   08/27/2022 137 (H) 70 - 99 mg/dL Final      Urea Nitrogen   Date Value Ref Range Status   01/31/2023 13.1 8.0 - 23.0 mg/dL Final   05/16/2022 15 7 - 30 mg/dL Final   07/07/2021 22 7 - 30 mg/dL Final     Creatinine   Date Value Ref Range Status   01/31/2023 0.78 0.51 - 0.95 mg/dL Final   07/07/2021 0.78 0.52 - 1.04 mg/dL Final     GFR Estimate   Date Value Ref Range Status   01/31/2023 72 >60 mL/min/1.73m2 Final     Comment:     eGFR calculated using 2021 CKD-EPI equation.   07/07/2021 68 >60 mL/min/[1.73_m2] Final     Comment:     Non  GFR Calc  Starting 12/18/2018, serum creatinine based estimated GFR (eGFR) will be   calculated using the Chronic Kidney Disease Epidemiology Collaboration   (CKD-EPI) equation.       Calcium   Date Value Ref Range Status   01/31/2023 8.9 8.2 - 9.6 mg/dL Final   07/07/2021 9.1 8.5 - 10.1 mg/dL Final     CBC RESULTS: Recent Labs   Lab Test 01/31/23  0834   WBC 75.2*   RBC 2.84*   HGB 8.1*   HCT 27.5*   MCV 97   MCH 28.5   MCHC 29.5*   RDW 16.5*   *     TSH   Date Value Ref Range Status   01/31/2023 0.93 0.30 - 4.20 uIU/mL Final   05/16/2022 12.93 (H) 0.40 - 4.00 mU/L Final   03/24/2021 0.81 0.40 - 4.00 mU/L Final     T4 Free   Date Value Ref Range Status   12/09/2018 1.15 0.76 - 1.46 ng/dL Final     Free T4   Date Value Ref Range Status   05/16/2022 1.02 0.76 - 1.46 ng/dL Final         ASSESSMENT:    Encounter Diagnoses   Name Primary?     COPD exacerbation (H) Yes     Community acquired pneumonia, unspecified laterality      Hypothyroidism, unspecified type      Chronic congestive heart failure, unspecified heart failure type (H)        MEDICAL EQUIPMENT NEEDS:  None.  She does have home oxygen    DISCHARGE PLAN/FACE TO FACE:  I certify that services are/were furnished while this patient was under the care of a physician and that a physician or an allowed non-physician practitioner (NPP), had a face-to-face encounter that meets the physician face-to-face encounter requirements. The encounter was in  whole, or in part, related to the primary reason for home health. The patient is confined to his/her home and needs intermittent skilled nursing, physical therapy, speech-language pathology, or the continued need for occupational therapy. A plan of care has been established by a physician and is periodically reviewed by a physician.  Date of Face-to-Face Encounter: February 14, 2023    I certify that, based on my findings, the following services are medically necessary home health services: She will be discharging to home with current medications as well as physical and occupational therapy home health aide and nursing with an approximated discharge date of February 15, 2023    My clinical findings support the need for the above skilled services because: (Please write a brief narrative summary that describes what the RN, PT, SLP, or other services will be doing in the home. A list of diagnoses in this section does not meet the CMS requirements.)  She will require the skilled services secondary to her chronic medical conditions along with recent hospitalization and stay in the transitional care unit but also continuation of the rehabilitation process and home safety along with nursing for medication management    This patient is homebound because: (Please write a brief narrative summary describing the functional limitations as to why this patient is homebound and specifically what makes this patient homebound.)  Secondary to chronic medical conditions including her recent hospitalization but also continuation of assessment of her ADLs, rehabilitation, strengthening and nursing for medication management    The patient is, or has been, under my care and I have initiated the establishment of the plan of care. This patient will be followed by a physician who will periodically review the plan of care.    Schedule follow up visit with primary care provider within 7 days to reestablish care.  She will follow-up with her  primary care doctor regarding medication management as well as any future laboratory studies.  She already has home oxygen set up.  I had a chance to go over her laboratory studies as well as her current medications.  She feels comfortable going home and did feel comfortable working through the rehabilitation process.  She did not have any other questions.    Discharge coordination of care greater than 30 minutes    Electronically signed by: Sadiq Richards NP          Sincerely,        Sadiq Richards NP

## 2023-02-27 NOTE — PROGRESS NOTES
Clinic Care Coordination Contact    Follow Up Progress Note      Assessment: Initial enrollment to Care Coordination on 5/16/2019 for psychosocial support/resources. Follow-up call to patient today to check-in and review plan of care. Patients son, Ed, (consent to communicate on file) answered phone and shared patient was currently napping. Ed shared patient has had a decline both physically and mentally since returning home from [facility]. Ed stated patient no longer has the strength to even set up her medications. He reports he is visiting from [place] to assist with patients day to day needs. Ed shared they have increased patients services at [facility], however, Ed and his sister, Margaret, do not feel patient can be left alone. [facility] also spoke with Margaret whom reported patient was recently enrolled in [facility].      Care Gaps:  Health Maintenance Due   Topic Date Due     HF ACTION PLAN  Never done     MICROALBUMIN  Never done     ZOSTER IMMUNIZATION (1 of 2) Never done     LIPID  01/06/2022     FALL RISK ASSESSMENT  10/06/2022     PHQ-2 (once per calendar year)  01/01/2023     Declined due to enrollment in hospice.      Care Plans: None    Intervention/Education provided during outreach: Provided active listening and support to patients adult children during contact. Reviewed current services and offered to send additional resources for 24/7 support. Patients children shared they are working with [facility] to increase services as needed and have the option to move patient to Memory Care if/when needed. Allowed time and space for Ed/Margaret to voice any additional needs. Patients adult children declined further needs at this time. Encouraged patients family to lean on [facility] team and [facility] for any needs that arise. Patients family in agreement with plan. Due to enrollment in hospice, patient no longer a candidate for Care Coordination. Patients family expressed understanding and  thanked CC team for past support/resources.     Plan: No further outreaches at this time. If new needs arise a new Care Coordination referral may be placed.    DAVID Nuñez/[facility]  Social Work Care Coordinator  [facility], [place], [facility], and [place]  Phone: [phone]

## 2023-03-30 NOTE — Clinical Note
Updated physician the patient's skin redness.  He is coming to evaluate prior to putting on sterile draping. We did Botox injections for dystonia today. PROCEDURE: Botox Injections (Cervical Dystonia PROTOCOL)   PRE-OPERATIVE DIAGNOSIS: Cervical dystonia, anterocollis. POST-OPERATIVE DIAGNOSIS: same as above   BLOOD LOSS: minimal COMPLICATIONS: none     DESCRIPTION OF PROCEDURE: After a discussion of the risks and benefits, the patient consented to the procedure. The patient was taken to the procedure room. The upper back, neck area was prepped with alcohol swabs. The 2 Botox vials containing 100 units each, were reconstituted with 20 cc saline. Procedure was performed under EMG guidance. Following muscles and units were injected:    Scalenus anterior/medius 50 units on each side (total of 100 units). Sternocleidomastoid muscle bialterally 35 units each (total of 70 units)     Patient tolerated the procedure well. Total of 170 Units of botulinum toxin were used and 30 Units were wasted.

## 2023-06-15 NOTE — TELEPHONE ENCOUNTER
No alternative medication, Patient is encouraged to diet and exercise - per AP    Spoke with Patient, he states pharmacy said it needs a Prior Auth, I informed him that we would need the PA information sent to our ofc in order for that to be completed, he is going to contact pharmacy.    [facility]  BMP and SN orders  Dr. Kiser's in basket   Fax

## 2023-07-04 NOTE — ED TRIAGE NOTES
Pt BIBA from senior living with constipation. Pt on hospice for COPD and hospice RN made a visit today per family request d/t pt difficulty having bowel movements. Hospice RN reported tennis ball sized mass ULQ of abdomen. Pt on morphine chronically, taking imodium at home for loose stools. Pt denies pain currently. Pt on 4L O2 at baseline. Denies fever, chills at home. Afebrile on arrival and VSS. A&Ox4.

## 2023-07-04 NOTE — ED NOTES
Bed: ED28  Expected date: 7/3/23  Expected time: 7:17 PM  Means of arrival: Ambulance  Comments:  89 yo abd pain

## 2023-07-04 NOTE — ED TRIAGE NOTES
Triage Assessment     Row Name 07/03/23 1942       Triage Assessment (Adult)    Airway WDL WDL       Respiratory WDL    Respiratory WDL WDL  on 4L baseline, COPD on hospice       Skin Circulation/Temperature WDL    Skin Circulation/Temperature WDL WDL       Cardiac WDL    Cardiac WDL WDL       Peripheral/Neurovascular WDL    Peripheral Neurovascular WDL WDL       Cognitive/Neuro/Behavioral WDL    Cognitive/Neuro/Behavioral WDL WDL

## 2023-07-04 NOTE — DISCHARGE INSTRUCTIONS
You were found to have a mass on your abdomen.  This is not consistent with stool or obstruction based on your x-rays.  Based on your goals of care and hospice, we have decided together not to further work this up.    Discharge Instructions  Abdominal Pain    Abdominal pain (belly pain) can be caused by many things. Your evaluation today does not show the exact cause for your pain. Your provider today has decided that it is unlikely your pain is due to a life threatening problem, or a problem requiring surgery or hospital admission. Sometimes those problems cannot be found right away, so it is very important that you follow up as directed.  Sometimes only the changes which occur over time allow the cause of your pain to be found.    Generally, every Emergency Department visit should have a follow-up clinic visit with either a primary or a specialty clinic/provider. Please follow-up as instructed by your emergency provider today. With abdominal pain, we often recommend very close follow-up, such as the following day.    ADULTS:  Return to the Emergency Department right away if:    You get an oral temperature above 102oF or as directed by your provider.  You have blood in your stools. This may be bright red or appear as black, tarry stools.    You keep vomiting (throwing up) or cannot drink liquids.  You see blood when you vomit.   You cannot have a bowel movement or you cannot pass gas.  Your stomach gets bloated or bigger.  Your skin or the whites of your eyes look yellow.  You faint.  You have bloody, frequent or painful urination (peeing).  You have new symptoms or anything that worries you.    CHILDREN:  Return to the Emergency Department right away if your child has any of the above-listed symptoms or the following:    Pushes your hand away or screams/cries when his/her belly is touched.  You notice your child is very fussy or weak.  Your child is very tired and is too tired to eat or drink.  Your child is  dehydrated.  Signs of dehydration can be:  Significant change in the amount of wet diapers/urine.  Your infant or child starts to have dry mouth and lips, or no saliva (spit) or tears.    PREGNANT WOMEN:  Return to the Emergency Department right away if you have any of the above-listed symptoms or the following:    You have bleeding, leaking fluid or passing tissue from the vagina.  You have worse pain or cramping, or pain in your shoulder or back.  You have vomiting that will not stop.  You have a temperature of 100oF or more.  Your baby is not moving as much as usual.  You faint.  You get a bad headache with or without eye problems and abdominal pain.  You have a seizure.  You have unusual discharge from your vagina and abdominal pain.    Abdominal pain is pretty common during pregnancy.  Your pain may or may not be related to your pregnancy. You should follow-up closely with your OB provider so they can evaluate you and your baby.  Until you follow-up with your regular provider, do the following:     Avoid sex and do not put anything in your vagina.  Drink clear fluids.  Only take medications approved by your provider.    MORE INFORMATION:    Appendicitis:  A possible cause of abdominal pain in any person who still has their appendix is acute appendicitis. Appendicitis is often hard to diagnose.  Testing does not always rule out early appendicitis or other causes of abdominal pain. Close follow-up with your provider and re-evaluations may be needed to figure out the reason for your abdominal pain.    Follow-up:  It is very important that you make an appointment with your clinic and go to the appointment.  If you do not follow-up with your primary provider, it may result in missing an important development which could result in permanent injury or disability and/or lasting pain.  If there is any problem keeping your appointment, call your provider or return to the Emergency Department.    Medications:  Take your  "medications as directed by your provider today.  Before using over-the-counter medications, ask your provider and make sure to take the medications as directed.  If you have any questions about medications, ask your provider.    Diet:  Resume your normal diet as much as possible, but do not eat fried, fatty or spicy foods while you have pain.  Do not drink alcohol or have caffeine.  Do not smoke tobacco.    Probiotics: If you have been given an antibiotic, you may want to also take a probiotic pill or eat yogurt with live cultures. Probiotics have \"good bacteria\" to help your intestines stay healthy. Studies have shown that probiotics help prevent diarrhea (loose stools) and other intestine problems (including C. diff infection) when you take antibiotics. You can buy these without a prescription in the pharmacy section of the store.     If you were given a prescription for medicine here today, be sure to read all of the information (including the package insert) that comes with your prescription.  This will include important information about the medicine, its side effects, and any warnings that you need to know about.  The pharmacist who fills the prescription can provide more information and answer questions you may have about the medicine.  If you have questions or concerns that the pharmacist cannot address, please call or return to the Emergency Department.       Remember that you can always come back to the Emergency Department if you are not able to see your regular provider in the amount of time listed above, if you get any new symptoms, or if there is anything that worries you.    "

## 2023-07-04 NOTE — ED PROVIDER NOTES
History     Chief Complaint:  Constipation       HPI   Marie Kline is a 89 year old female with a history of COPD, chronic lymphocytic leukemia, bladder cancer, and hypertension who presents via EMS today with bowel issures. Her hospice nurse reports that family said she looked distended and when she arrived at patients apartment and in pain.  Patient has no complaints and reports that she is feeling fine.  Denies vomiting at home.  Nurse reports that patient is on morphine and is only having liquid stools.  When nurse felt her abdomen, she appeared in pain.  Nurse was concerned about constipation or an obstruction.  She also palpated a mass in her left abdomen prompting ED evaluation.  Patient is in hospice for COPD .     Independent Historian:   Hospice nurse supplements as above.      Review of External Notes:   None    Medications:    Albuterol  Dulcolax  Lexapro  Levothyroxine   Loperamide  Metoprolol succinate   Revefenacin     Past Medical History:    Arthritis   Bladder cancer  CLL  Cognitive impairment   Congestive heart failure  COPD   Dysphagia   Hypertension  Hypothyroid   Mumps   Pulmonary edema cardiac cause  Stress-induced cardiomyopathy  Tricuspid valve disorders    Past Surgical History:    Biopsy lymph node inguinal  Bladder surgery   Coronary angiography  Left heart cath  Cystoscopy x3  EGD   Open reduction internal fixation hip bipolar    Open reduction internal fixation hip unipolar     Physical Exam     Patient Vitals for the past 24 hrs:   BP Temp Temp src Pulse Resp SpO2   07/03/23 2140 (!) 142/65 -- -- 60 -- 99 %   07/03/23 1941 126/51 98.9  F (37.2  C) Oral 64 18 --      Physical Exam      Eyes:    Conjunctiva normal  Neck:    Supple, no meningismus.     CV:     Regular rate and rhythm.      No murmurs, rubs or gallops.  PULM:    Clear to auscultation bilateral.       No respiratory distress.      Good air exchange.  ABD:    Soft, non-distended.       Mild tenderness in the  LUQ.      Nonpulsatile generalized mass in the left upper quadrant/left mid abdomen     Bowel sounds normal.     No rebound, guarding or rigidity.     No CVA tenderness.      No hepatosplenomegaly.  MSK:     No gross deformity to all four extremities.   LYMPH:   No cervical lymphadenopathy.  NEURO:   Alert.  Good muscular tone, no atrophy.   Skin:    Warm, dry and intact.    Psych:    Mood is good and affect is appropriate.      Emergency Department Course   Imaging:  Abdomen XR 1 vw   Final Result   IMPRESSION: Right hip bipolar endoprosthesis has been placed. Remainder unchanged. Left hip bipolar endoprosthesis. Severe calcified atherosclerosis. Nonobstructed bowel gas pattern. Normal amount of stool in the colon.         Report per radiology    Procedures   None    Emergency Department Course & Assessments:       Interventions:  Medications - No data to display     Assessments:   I obtained history and examined the patient as noted above.     Independent Interpretation (X-rays, CTs, rhythm strip):  None    Consultations/Discussion of Management or Tests:  None        Social Determinants of Health affecting care:   None    Disposition:  The patient was discharged to home.     Impression & Plan    CMS Diagnoses: None    Medical Decision Makin-year-old female on hospice for COPD presents with concerns of of abdominal mass and abnormal bowel habits.  Primary concern from hospice nurse was for bowel obstruction or severe constipation due to ongoing opioid therapy.  Patient denies constipation and abdominal x-ray is without significant stool burden or obstructive radiographic changes.  She has no obstructive findings on examination.      She has a palpable left-sided abdominal mass of uncertain etiology.  I had a prolonged discussion with the patient as well as her sons about further work-up.  Ultimately we decided that further work-up is unnecessary as patient's goals of care are comfort alone and is  currently on hospice.  We will not proceed with further advanced imaging including CT scan despite the concerning abdominal mass as goal is to limit medical interventions/testing and focus on comfort care alone.    Diagnosis:    ICD-10-CM    1. Left upper quadrant abdominal mass  R19.02       2. Diarrhea, unspecified type  R19.7            Discharge Medications:  Discharge Medication List as of 7/3/2023  9:33 PM         Scribe Disclosure:  I, Eduardo Hutchins, am serving as a scribe at 9:21 PM on 7/3/2023 to document services personally performed by Landry Carl MD based on my observations and the provider's statements to me.     7/3/2023   Landry Carl MD Matthews, Jeremiah R, MD  07/03/23 7494

## 2023-07-28 NOTE — ED TRIAGE NOTES
Pt presentst o the ED via ambulance for fall and head laceration. Pt lives at [facility] and fell this afternoon. Pt landed on right side and complains of right hip and right shoulder pain. Pt ha a head lac to right side of head. Pt is on hospice but family wanted her seen in ED for injuries. 50 mcg fentanyl given via EMS. .     Triage Assessment       Row Name 07/28/23 5347       Triage Assessment (Adult)    Airway WDL WDL       Respiratory WDL    Respiratory WDL X  SOB       Skin Circulation/Temperature WDL    Skin Circulation/Temperature WDL X  laceration to right head       Peripheral/Neurovascular WDL    Peripheral Neurovascular WDL WDL       Cognitive/Neuro/Behavioral WDL    Cognitive/Neuro/Behavioral WDL WDL

## 2023-07-28 NOTE — ED PROVIDER NOTES
History     Chief Complaint:  Fall and Laceration     HPI   Marie Kline is a 89 year old female, in hospice care, with history of COPD, hypertension, bladder cancer, CLL, CHF, pulmonary edema, stage 3 CKD, and hyperlipidemia who presents with her hospice care nurse Ally for evaluation of hip pain and head laceration following a fall. Per her hospice nurse, the patient fell in the kitchen today on and hit the right side of her hip as well as the right side of her head. The patient reports experiencing severe right hip pain. The patient also reports having right elbow and leg pain. The patient is on 3L supplemental oxygen [place] at baseline. Denies neck pain, abdominal pain, or pain on the left side of the hip. No numbness, tingling, or weakness. Patient is not anticoagulated.     Independent Historian:   Caregiver - They report as noted above.    Review of External Notes:  I reviewed the FACE sheet and medications list from the patient's hospice care    ROS:  See HPI    Allergies:  Iodinated contrast media/dye  Prolia  Rocephin  Wound dressing adhesive    Physical Exam     Patient Vitals for the past 24 hrs:   BP Temp Temp src Pulse Resp SpO2   07/29/23 0000 123/54 -- -- 66 -- 97 %   07/28/23 1847 -- -- -- -- -- 91 %   07/28/23 1745 122/59 -- -- 65 -- (!) 88 %   07/28/23 1649 (!) 112/91 99  F (37.2  C) Oral 71 20 96 %      Physical Exam  General: Well appearing, nontoxic. Resting comfortably  Head:  Right posterior scalp laceration 3.0 cm. Remainder of head and scalp otherwise atraumatic and non tender to palpation. No bony crepitus or step offs.   Eyes:  Pupils are equal, round, reactive to light EOMI, no nystagmus    Conjunctivae non-injected and sclerae white  ENT:    The external nose is normal. Face is atraumatic.    Pinnae are normal  Neck:  Normal range of motion. Cervical spine nontender, no stepoffs    There is no rigidity noted    Trachea is in the midline  CV:  Regular rate and rhythm     Normal S1/S2, no  S3/S4    No murmur or rub. Radial pulses 2+ bilaterally.  Resp:  Lungs are clear and equal bilaterally  There is no tachypnea    No increased work of breathing    No rales, wheezing, or rhonchi  GI:  Abdomen is soft, no rigidity or guarding    No distension. Palpable mass left lower abdomen.    No tenderness or rebound tenderness   MS:  Normal muscular tone. Significant anterior hip/groin pain with any movement of the right lower extremity. Left lower extremity atraumatic with full painless ROM. Remainder of right lower extremity is otherwise atraumatic and non tender to palpation. Chest wall/ribs stable and non tender to palpation. Right elbow contusion/ecchymosis without bony tenderness to palpation and normal ROM. Bilateral upper extremities otherwise atraumatic.     Symmetric motor strength    No lower extremity edema  Skin:  Superficial skin tear to the posterior right elbow/upper arm. No rash or acute skin lesions noted  Neuro:  Awake and alert, oriented to person and place. Cn II-XII intact. Strength 5/5 and intact throughout. SILT throughout. No tremors.   Speech is normal and fluent  Moves all extremities spontaneously  Psych: Normal affect. Appropriate interactions.    Emergency Department Course   Imaging:  XR Pelvis 1/2 Views   Final Result   IMPRESSION: Bilateral total hip replacements. Bones are markedly demineralized.       There is deformity and irregularity of the right acetabulum, likely due to a comminuted fracture. Recommend CT of the right hip with MARS for further evaluation.      Chronic fracture deformities of the right pubic rami. No definitive evidence of a superimposed acute fracture.      No distal right femoral fracture.      NOTE: ABNORMAL REPORT      THE DICTATION ABOVE DESCRIBES AN ABNORMALITY FOR WHICH FOLLOW-UP IS NEEDED.       XR Femur Right 2 Views   Final Result   IMPRESSION: Bilateral total hip replacements. Bones are markedly demineralized.       There is deformity and  irregularity of the right acetabulum, likely due to a comminuted fracture. Recommend CT of the right hip with MARS for further evaluation.      Chronic fracture deformities of the right pubic rami. No definitive evidence of a superimposed acute fracture.      No distal right femoral fracture.      NOTE: ABNORMAL REPORT      THE DICTATION ABOVE DESCRIBES AN ABNORMALITY FOR WHICH FOLLOW-UP IS NEEDED.          Report per radiology    Laboratory:  Labs Ordered and Resulted from Time of ED Arrival to Time of ED Departure   BASIC METABOLIC PANEL - Abnormal       Result Value    Sodium 142      Potassium 4.5      Chloride 100      Carbon Dioxide (CO2) 34 (*)     Anion Gap 8      Urea Nitrogen 19.1      Creatinine 1.08 (*)     Calcium 8.9      Glucose 121 (*)     GFR Estimate 48 (*)    CBC WITH PLATELETS AND DIFFERENTIAL - Abnormal    WBC Count 106.8 (*)     RBC Count 2.97 (*)     Hemoglobin 8.7 (*)     Hematocrit 29.7 (*)           MCH 29.3      MCHC 29.3 (*)     RDW 17.5 (*)     Platelet Count 87 (*)    DIFFERENTIAL - Abnormal    % Neutrophils 2      % Lymphocytes 92      % Monocytes 4      % Eosinophils 0      % Basophils 0      % Plasma Cells 2      Absolute Neutrophils 2.1      Absolute Lymphocytes 98.3 (*)     Absolute Monocytes 4.3 (*)     Absolute Eosinophils 0.0      Absolute Basophils 0.0      Absolute Plasma Cells 2.1 (*)     RBC Morphology Confirmed RBC Indices      Platelet Assessment        Value: Automated Count Confirmed. Platelet morphology is normal.    Reactive Lymphocytes Present (*)       Procedures    Laceration Repair      Procedure: Laceration Repair    Indication: Laceration    Consent: Verbal    Location: Right Scalp     Length: 3.0 cm    Preparation: Irrigation with Sterile Saline.    Anesthesia/Sedation: Lidocaine with Epinephrine - 1%      Treatment/Exploration: Wound explored, no foreign bodies found     Closure: The wound was closed with  4 staples.    Patient Status: The patient  tolerated the procedure well: Yes. There were no complications.    Emergency Department Course & Assessments:    Interventions:  Medications   morphine (PF) injection 4 mg (4 mg Intravenous $Given 7/29/23 0012)   acetaminophen (TYLENOL) tablet 1,000 mg (1,000 mg Oral $Given 7/28/23 1736)   lidocaine 1% with EPINEPHrine 1:100,000 1 %-1:923075 injection (  $Given by Other Clinician 7/28/23 2044)      Assessments, Independent Interpretation, Consult/Discussion of ManagementTests:  ED Course as of 07/29/23 1218   Fri Jul 28, 2023 1718 I examined the patient and obtained history as noted above.     1734 I called both the patient's daughter Margaret and son-in-law Wenceslao but was not able to speak with either one.   1843 I rechecked and updated the patient.     1946 I performed an independent interpretation of the patient's pelvis radiographs.  There appears to be a right-sided pelvic fracture although the details of this are difficult to see based on positioning and radiograph projection.   2004 I rechecked and updated the patient.       Social Determinants of Health affecting care:  None    Disposition:  The patient was discharged to home.     Impression & Plan    Medical Decision Making:  Marie Kline is a 89 year old female who presents to the ED for evaluation of injuries sustained after a fall at home.  Patient is currently in hospice care for multiple chronic illnesses.  On my evaluation the patient is hemodynamically stable.  No focal neurologic deficits.  She has a closed head injury with right posterior scalp wound.  This was repaired with staples as noted above.  Staples should be removed in 10 to 14 days.  Additionally patient suffered a contusion to her right elbow and she had severe hip pain.  Patient has a history of prior bilateral hip arthroplasties.  Radiographs of the right hip, pelvis and femur demonstrate likely acetabular pelvic fracture although due to the patient's age and bony demineralization the  details of the fracture are difficult to ascertain based on plain radiographs alone.  I discussed with the patient as well as the patient's granddaughter and hospice care nurse the patient's goals of care.  At this time they are not interested in pursuing any further advanced imaging including CT scan of the head, neck or pelvis to further determine the presence of any injuries that she would not want any aggressive interventions or measures to treat these.  I discussed with her that due to her pelvic and acetabular fracture she will no longer be able to walk or stand.  The patient's hospice nurse confirms that the patient has orders for morphine and pain medications which can be increased to every 1 hour as needed in keeping with her goals of staying at home and staying comfortable.  Patient does not wish to pursue hospitalization at this time.  Patient and family wish to discharge to home for ongoing hospice care and end-of-life care.  I discussed with them that we could not know the extent of the patient's injuries without advanced imaging however the patient and the patient's family are comfortable with this due to her comfort care status.  I recommended the patient be bed and wheelchair-bound only, as tolerated.  Patient and family are agreeable with the plan of care and she was discharged in stable condition.      Diagnosis:    ICD-10-CM    1. Fall at home, initial encounter  W19.XXXA     Y92.009       2. Closed displaced fracture of right acetabulum, unspecified portion of acetabulum, initial encounter (H)  S32.401A       3. Scalp laceration, initial encounter  S01.01XA       4. Contusion of right elbow, initial encounter  S50.01XA            Discharge Medications:  Discharge Medication List as of 7/28/2023  9:44 PM         7/28/2023   Reji Walker MD     Scribe Disclosure:  Solo HANKS, am serving as a scribe at 5:42 PM on 7/28/2023 to document services personally performed by Reji Walker MD  based on my observations and the provider's statements to me.    Scribe Disclosure:  I, Yuko Webber, am serving as a scribe  at 5:51 PM on 7/28/2023 to document services personally performed by Reji Walker MD based on my observations and the provider's statements to me.    Historical Data:  ______________________________________________________________________  Medications:    Albuterol   Dulcolax  Pulmicort  Lexapro  Lasix  Levothyroxine  Ferrous sulfate  Imodium  Toprol XL  Senna-docusate  Diphenhydramine  Ambien  Maxzide  Advair  Neurontin  Lipitor  Symbicort  Prolia  Benadryl  Ativan  Fluticasone-salmeterol  Celexa  Roxicodone  Deltasone  Zithromax  Pulmicort    Past Medical History:  Arthritis  Acute and chronic respiratory failure   Bladder cancer  Chronic lymphocytic leukemia   Cognitive impairment  CHF  COPD  Cardiomyopathy   Dysphagia  Hypertension  Hypothyroidism  Mumps  Pulmonary edema  Stress-induced cardiomyopathy  Tricuspid valve disorders  Hypogammaglobulinemia  Hyponatremia  Hyperlipidemia   Hypoxia  Hypokalemia  Pneumonia  Insomnia  Hyperlipidemia  LESLY  CKD stage 3  Lumbar foraminal stenosis  DDD  Osteoporosis  Xerosis cutis  NSTEMI  Malnutrition  Sepsis   Pelvic hematoma  Femoral neck fracture    Past Surgical History:    Inguinal lymph node biopsy, right  Bladder surgery  Coronary angiography  CV left heart catheter  EGD  Internal hip fixation, right   Wrist surgery  Appendectomy  Cataract  Hysterectomy  Oophorectomy   Breast lumpectomy, right              Family History:    family history includes Cancer in her father; Cerebrovascular Disease in her mother. Social History:   reports that she quit smoking about 27 years ago. Her smoking use included cigarettes. She has never used smokeless tobacco. She reports that she does not drink alcohol and does not use drugs.     PCP: Alma Brito Ryan Clay, MD  07/29/23 5413

## 2023-07-29 NOTE — ED NOTES
Report called and given to JEROMY Velazquez at [facility]. Pt ready for transport via ambulance back to facility.

## 2023-08-04 NOTE — PROGRESS NOTES
Problem: At Risk for Falls  Goal: # Patient does not fall  Outcome: Outcome Met, Continue evaluating goal progress toward completion  Goal: # Takes action to control fall-related risks  Outcome: Outcome Met, Continue evaluating goal progress toward completion  Goal: # Verbalizes understanding of fall risk/precautions  Description: Document education using the patient education activity  Outcome: Outcome Met, Continue evaluating goal progress toward completion      "SPIRITUAL HEALTH SERVICES  SPIRITUAL ASSESSMENT Progress Note  [id number]    PRIMARY FOCUS:     Emotional/spiritual/Presybeterian distress    Support for coping    ILLNESS CIRCUMSTANCES:   Reviewed documentation. Reflective conversation shared with Marie which integrated elements of illness and family narratives.     Context of Serious Illness/Symptom(s) - Marie has shortness of breath and she admitted to waiting too long to ask for help.  Pt is having further tests done to diagnose other problems as well.  She was a little on edge when I arrived and was eager to share her story with me.     Persons/Resources Involved -   o Pt has 3 children   o Daughter lives in [place]    DISTRESS:     Emotional/Existential/Relational Distress - When I first arrived Marie looked anxious and she asked me to sit next to her and she grabbed my hand.  My presence calmed her as she shared her life story with me.  I asked her if she was nervous or anxious about her health and she replied \"no God has always taken care of me\"    Spiritual/Druze Distress - none     Social/Cultural/Economic Distress - none mentioned    SPIRIT (Coping):     Christian/Caty - Anabaptist     Spiritual Practice(s) - prayer and Confucianism attendance with her daughter at [facility].  She also attends Jewish at the [place] where she lives    Emotional/Existential/Relational Connections - none mentioned    SENSE-MAKING:    Goals of Care - discover what is wrong     Meaning/Sense-Making - Marie takes great meaning from her relationship with God.  She relies on God, \"I don't know what I'd do without Him\"     PLAN: I will email  team to follow up - she has requested a daily visit.        Levon Plummer   Intern  Pager [phone]    "

## 2023-08-18 PROBLEM — U07.1 2019 NOVEL CORONAVIRUS DISEASE (COVID-19): Status: RESOLVED | Noted: 2021-09-20 | Resolved: 2023-08-18

## 2023-09-05 ENCOUNTER — DOCUMENTATION ONLY (OUTPATIENT)
Dept: INFECTION CONTROL | Facility: CLINIC | Age: 88
End: 2023-09-05
Payer: MEDICARE

## 2023-09-05 DIAGNOSIS — Z20.9 INFECTIOUS DISEASE CONTACT: Primary | ICD-10-CM

## 2023-09-28 ENCOUNTER — TELEPHONE (OUTPATIENT)
Dept: PHARMACY | Facility: CLINIC | Age: 88
End: 2023-09-28
Payer: MEDICARE

## 2023-09-28 NOTE — TELEPHONE ENCOUNTER
Patient called to be notified that she is due for an MTM follow up visit. Left a voicemail with instructions on how to schedule this visit.    Marilee Arias, PharmD  Medication Therapy Management Pharmacist  Voicemail: [phone]

## 2023-09-30 ENCOUNTER — HEALTH MAINTENANCE LETTER (OUTPATIENT)
Age: 88
End: 2023-09-30

## 2024-06-11 NOTE — LETTER
My COPD Action Plan     Name: Marie Kline    YOB: 1932   Date: 1/8/2018    My doctor: Yuliana Cast [facility]   My clinic: [facility]  [address]  [facility] [id number] [phone]  My Controller Medicine: Perforomist    Dose: 1 neb twice daily     My Rescue Medicine: Albuterol (Proair/Ventolin/Proventil) inhaler   Dose: as needed     My Flare Up Medicine: none        My COPD Severity: Moderate = FeV1 < 79% -50%      Use of Oxygen: Oxygen Not Prescribed      Make sure you've had your pneumonia   vaccines.          GREEN ZONE       Doing well today      Usual level of activity and exercise    Usual amount of cough and mucus    No shortness of breath    Usual level of health (thinking clearly, sleeping well, feel like eating) Actions:      Take daily medicines    Use oxygen as prescribed    Follow regular exercise and diet plan    Avoid cigarette smoke and other irritants that harm the lungs           YELLOW ZONE          Having a bad day or flare up      Short of breath more than usual    A lot more sputum (mucus) than usual    Sputum looks yellow, green, tan, brown or bloody    More coughing or wheezing    Fever or chills    Less energy; trouble completing activities    Trouble thinking or focusing    Using quick relief inhaler or nebulizer more often    Poor sleep; symptoms wake me up    Do not feel like eating Actions:      Get plenty of rest    Take daily medicines    Use quick relief inhaler every 6 hours    If you use oxygen, call you doctor to see if you should adjust your oxygen    Do breathing exercises or other things to help you relax    Let a loved one, friend or neighbor know you are feeling worse    Call your care team if you have 2 or more symptoms.  Start taking steroids or antibiotics if directed by your care team           RED ZONE       Need medical care now      Severe shortness of breath (feel you can't breathe)    Fever, chills    Not  enough breath to do any activity    Trouble coughing up mucus, walking or talking    Blood in mucus    Frequent coughing   Rescue medicines are not working    Not able to sleep because of breathing    Feel confused or drowsy    Chest pain    Actions:      Call your health care team.  If you cannot reach your care team, call 911 or go to the emergency room.        Electronically signed by: Yuliana Cast, January 8, 2018  Annual Reminders:  Meet with Care Team, Flu Shot every Fall  Pharmacy: [id number] - [facility] [address] [place] [address]   Alert-The patient is alert, awake and responds to voice. The patient is oriented to time, place, and person. The triage nurse is able to obtain subjective information.

## 2024-09-13 NOTE — PROGRESS NOTES
"Dr. Obi Garcia  [facility]  Neurosurgery Clinic Visit        CC: low back and left leg pain    Primary care Provider: Piper Kiser      Reason For Visit:   I was asked by Dr. Kiser to consult on the patient for lumbar radicular pain on the left.      HPI: Marie Kline is a 86 year old female with lumbar radicular pain on the left.  She notes that this started about 4-5 years ago without incident. She states that she has back and left leg pain. She feels very \"unsure\" of her left leg and feels like it is numb and she feels like she is going to fall. She states that she has been told she is turning her left foot in at times as well. She denies any falls due to this.  She reports back pain with pain down the anterior and lateral leg to the foot. She gets numbness and a mikhail horse pain in it. She notes standing and walking make it worse. She also notes that sleeping can be difficulty depending on the position. She has not had PT or injection therapy for her pain.  The pt states that she lives alone and is able to care for herself but has pain.     Pain at its worst 10  Pain right now:  5    Past Medical History:   Diagnosis Date     Bladder cancer (H)      Cardiomyopathy (H)      CLL (chronic lymphocytic leukemia) (H)      Congestive heart failure (H) 10/24/2014    flash pulm edema ass w/Takotsubo     COPD (chronic obstructive pulmonary disease) (H)     noc O2     Hypothyroid      Mumps      Myocardial infarction 10/2014    Takotsubo presentation w/mild CAD     Pulmonary edema cardiac cause (H) 10/08/2014    associated w/Takotsubo ACS     Tricuspid valve disorders, 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    TR 2-3+ per echo       Past Medical History reviewed with patient during visit.    Past Surgical History:   Procedure Laterality Date     BLADDER SURGERY       CORONARY ANGIOGRAPHY ADULT ORDER  10/2014    minimal CAD     CYSTOSCOPY       CYSTOSCOPY, BIOPSY BLADDER, COMBINED N/A " 2/9/2016    Procedure: COMBINED CYSTOSCOPY, BIOPSY BLADDER;  Surgeon: Kar Nuñez MD;  Location: [facility] OR     CYSTOSCOPY, TRANSURETHRAL RESECTION (TUR) TUMOR BLADDER, COMBINED N/A 2/9/2016    Procedure: COMBINED CYSTOSCOPY, TRANSURETHRAL RESECTION (TUR) TUMOR BLADDER;  Surgeon: Kar Nuñez MD;  Location: [facility] OR     ESOPHAGOSCOPY, GASTROSCOPY, DUODENOSCOPY (EGD), COMBINED N/A 6/22/2016    Procedure: COMBINED ESOPHAGOSCOPY, GASTROSCOPY, DUODENOSCOPY (EGD);  Surgeon: Freddie Diez MD;  Location: [facility]     Past Surgical History reviewed with patient during visit.    Current Outpatient Prescriptions   Medication     albuterol (2.5 MG/3ML) 0.083% neb solution     albuterol (PROAIR HFA/PROVENTIL HFA/VENTOLIN HFA) 108 (90 BASE) MCG/ACT Inhaler     allopurinol (ZYLOPRIM) 100 MG tablet     atorvastatin (LIPITOR) 20 MG tablet     budesonide-formoterol (SYMBICORT) 80-4.5 MCG/ACT Inhaler     denosumab (PROLIA) 60 MG/ML SOLN injection     denosumab (PROLIA) 60 MG/ML SOLN injection     Diphenhyd-HC-Nystatin-Tetracyc (FIRST-HANS MOUTHWASH) SUSP     ferrous sulfate (IRON) 325 (65 FE) MG tablet     fluticasone-vilanterol (BREO ELLIPTA) 200-25 MCG/INH oral inhaler     furosemide (LASIX) 20 MG tablet     gabapentin (NEURONTIN) 300 MG capsule     levothyroxine (SYNTHROID/LEVOTHROID) 75 MCG tablet     metoprolol succinate (TOPROL-XL) 25 MG 24 hr tablet     Multiple Vitamins-Minerals (MULTIVITAMIN GUMMIES ADULT PO)     traZODone (DESYREL) 50 MG tablet     No current facility-administered medications for this visit.        Allergies   Allergen Reactions     Contrast Dye Hives       Social History     Social History     Marital status:      Spouse name: N/A     Number of children: N/A     Years of education: N/A     Social History Main Topics     Smoking status: Former Smoker     Types: Cigarettes     Quit date: 2/3/1996     Smokeless tobacco: Never Used     Alcohol use No     Drug use: No     Sexual activity: No  "    Other Topics Concern     Caffeine Concern No     1/2-2 cups daily     Special Diet Yes     no added salt, no dairy, more gatorade     Exercise No     walking her dog 5 times per day - none recently     Parent/Sibling W/ Cabg, Mi Or Angioplasty Before 65f 55m? No     Social History Narrative       Family History   Problem Relation Age of Onset     CEREBROVASCULAR DISEASE Mother      CANCER Father          Review Of Systems  Skin: negative  Eyes: negative  Ears/Nose/Throat: negative  Respiratory: Asthma,COPD  Cardiovascular: HTN/HLD  Gastrointestinal: negative  Genitourinary: history of bladder cancer with tumor resection Musculoskeletal: back pain  Neurologic: left leg pain   Psychiatric: negative  Hematologic/Lymphatic/Immunologic: leukemia treated by Dr. Ivory with monthly infusions   Endocrine: negative and thyroid disorder     ROS: 10 point ROS neg other than the symptoms noted above in the HPI.    Vital Signs: Ht 4' 11\" (1.499 m)  Wt 106 lb (48.1 kg)  BMI 21.41 kg/m2    Examination:  Constitutional:  Alert, well nourished, NAD.  Memory: recent and remote memory intact   HEENT: Normocephalic, atraumatic.   Pulm:  Without shortness of breath   CV:  No pitting edema of BLE.    Neurological:  Awake  Alert  Oriented x 3  Speech clear  Cranial nerves II - XII intact  PERRL  EOMI  Face symmetric  Tongue midline  Motor exam   Shoulder Abduction:  Right:  5/5   Left:  5/5  Biceps:                      Right:  5/5   Left:  5/5  Triceps:                     Right:  5/5   Left:  5/5  Wrist Extensors:       Right:  5/5   Left:  5/5  Wrist Flexors:           Right:  5/5   Left:  5/5  Intrinsics:                   Right:  5/5   Left:  5/5   Hip Flexor:                Right: 5/5  Left:  5/5  Hip Adductor:             Right:  5/5  Left:  5/5  Hip Abductor:             Right:  5/5  Left:  5/5  Gastroc Soleus:        Right:  5/5  Left:  5/5  Tib/Ant:                      Right:  5/5  Left:  5/5  EHL:                          " "Right:  5/5  Left:  5/5   Sensation normal to bilateral upper and lower extremities  Muscle tone to bilateral upper and lower extremities normal   Gait: Able to stand from a seated position. Normal non-antalgic, non-myelopathic gait.    Lumbar examination reveals tenderness of the spine and paraspinous muscles. Pain with lumbar extension.  Hip height is symmetrical. Negative SI joint, sciatic notch or greater trochanteric tenderness to palpation bilaterally.  Straight leg raise is positive for left leg and back pain.       Imaging: NONE    Assessment/Plan:   Marie Kline is a 86 year old female with lumbar radicular pain on the left.  She notes that this started about 4-5 years ago without incident. She states that she has back and left leg pain. She feels very \"unsure\" of her left leg and feels like it is numb and she feels like she is going to fall. She states that she has been told she is turning her left foot in at times as well. She denies any falls due to this.  She reports back pain with pain down the anterior and lateral leg to the foot. She gets numbness and a mikhail horse pain in it. She notes standing and walking make it worse. She also notes that sleeping can be difficulty depending on the position. She has not had PT or injection therapy for her pain.  The pt states that she lives alone and is able to care for herself but has pain. She is neurologically intact with back and left leg pain.  It was explained that since she has a history of bladder cancer with tumor resection and leukemia we will obtain a lumbar MRI with and without contrast.  She agreed to the POC.     Patient Instructions   1. Please call [facility] to have lumbar MRI completed. Call [phone] to schedule your scan.  I will contact you with results.          Ariadna Cassidy [facility]  [facility]  [address]  [address] [place] [id number]    Tel [phone]  Pager " [phone]     [phone]

## 2024-10-05 NOTE — LETTER
7/29/2019        RE: Marie Kline  [address]  [facility] [address]        [facility]  [place] Medical Record Number:  [id number]  Place of Service where encounter took place:  [facility]  (S) [[id number]]  Chief Complaint   Patient presents with     Nursing Home Acute       HPI:    Marie Kline  is a 87 year old (4/28/1932), who is being seen today for an episodic care visit.  HPI information obtained from: facility chart records, facility staff, patient report and [facility] chart review. Today's concern is:  Closed fracture of left shoulder with routine healing, subsequent encounter  Central spinal stenosis  Back pain with radiation  Pain uncontrolled over the weekend but seems to be better today because her nurse brings her pain medication in on time.  She also has some anxiety which also play into the mix of how well her pain is managed.  Her current orders show she has a scheduled Oxycodone and PRN.  She is quite small, but does not get overly sedated.  She has acute on top of chronic pain.  She denies constipation or any adverse affects from her pain medication.  Also has a new skin tear on left leg.    CLL (chronic lymphocytic leukemia) (HCC)  WBC today is down to 62.9.  Labs have been faxed to her Oncologist.  She feels tired but continues to work with therapy.          Past Medical and Surgical History reviewed in Epic today.    MEDICATIONS:  Current Outpatient Medications   Medication Sig Dispense Refill     acetaminophen (TYLENOL) 500 MG tablet Take 2 tablets (1,000 mg) by mouth every 8 hours For one week scheduled then change to prn       albuterol (PROAIR HFA/PROVENTIL HFA/VENTOLIN HFA) 108 (90 BASE) MCG/ACT Inhaler Inhale 2 puffs into the lungs every 6 hours as needed for wheezing 1 Inhaler 8     albuterol (PROVENTIL) (2.5 MG/3ML) 0.083% neb solution Take 2.5 mg by nebulization every 6 hours as needed for shortness of breath / dyspnea or   Jonatan Mcguire    Nursing Report ED to Floor:  Mental status: AxOx3  Ambulatory status: need supervision  Oxygen Therapy:  NC 4 L  Cardiac Rhythm: A fib  Admitted from: ed room 29  Safety Concerns:  covid + isolation  Social Issues: lives with wife  ED Room #:  29    ED Nurse Phone Extension - [phone] or may call [phone].      HPI:   Chief Complaint   Patient presents with    Shortness of Breath       Past Medical History:  Past Medical History:   Diagnosis Date    Afib     COPD (chronic obstructive pulmonary disease)     Diabetes mellitus, type II     Hypertension     Obesity     Sleep apnea     Vitamin D deficiency         Past Surgical History:  Past Surgical History:   Procedure Laterality Date    COLON RESECTION      NASAL POLYP EXCISION          Admitting Doctor:   Nicole Colin MD    Consulting Provider(s):  Consults       No orders found from 9/6/2024 to 10/6/2024.             Admitting Diagnosis:   The primary encounter diagnosis was COVID-19. Diagnoses of Acute respiratory failure, unspecified whether with hypoxia or hypercapnia, COPD exacerbation, and Community acquired pneumonia, unspecified laterality were also pertinent to this visit.    Most Recent Vitals:   Vitals:    10/05/24 1117 10/05/24 1130 10/05/24 1200 10/05/24 1230   BP: 138/69 138/70 138/71    BP Location:       Patient Position: Sitting      Pulse: 80 80 84 95   Resp: 18      Temp: 97.8 °F (36.6 °C)      SpO2: 95% 94% 93% 91%   Weight:       Height:           Active LDAs/IV Access:   Lines, Drains & Airways       Active LDAs       Name Placement date Placement time Site Days    Peripheral IV 10/05/24 1130 Right Antecubital 10/05/24  1130  Antecubital  less than 1                    Labs (abnormal labs have a star):   Labs Reviewed   COMPREHENSIVE METABOLIC PANEL - Abnormal; Notable for the following components:       Result Value    Glucose 110 (*)     BUN 83 (*)     CO2 30.0 (*)     Albumin 3.4 (*)     BUN/Creatinine Ratio 69.7 (*)     All other  components within normal limits    Narrative:     GFR Normal >60  Chronic Kidney Disease <60  Kidney Failure <15    The GFR formula is only valid for adults with stable renal function between ages 18 and 70.   BNP (IN-HOUSE) - Abnormal; Notable for the following components:    proBNP 2,706.0 (*)     All other components within normal limits    Narrative:     This assay is used as an aid in the diagnosis of individuals suspected of having heart failure. It can be used as an aid in the diagnosis of acute decompensated heart failure (ADHF) in patients presenting with signs and symptoms of ADHF to the emergency department (ED). In addition, NT-proBNP of <300 pg/mL indicates ADHF is not likely.    Age Range Result Interpretation  NT-proBNP Concentration (pg/mL:      <50             Positive            >450                   Gray                 300-450                    Negative             <300    50-75           Positive            >900                  Gray                300-900                  Negative            <300      >75             Positive            >1800                  Gray                300-1800                  Negative            <300   SINGLE HS TROPONIN T - Abnormal; Notable for the following components:    HS Troponin T 86 (*)     All other components within normal limits    Narrative:     High Sensitive Troponin T Reference Range:  <14.0 ng/L- Negative Female for AMI  <22.0 ng/L- Negative Male for AMI  >=14 - Abnormal Female indicating possible myocardial injury.  >=22 - Abnormal Male indicating possible myocardial injury.   Clinicians would have to utilize clinical acumen, EKG, Troponin, and serial changes to determine if it is an Acute Myocardial Infarction or myocardial injury due to an underlying chronic condition.        CBC WITH AUTO DIFFERENTIAL - Abnormal; Notable for the following components:    RBC 3.35 (*)     Hemoglobin 10.9 (*)     Hematocrit 34.7 (*)     .6 (*)     MCHC  wheezing       aspirin 81 MG EC tablet Take 81 mg by mouth daily       atorvastatin (LIPITOR) 20 MG tablet Take 1 tablet (20 mg) by mouth daily 90 tablet 4     bisacodyl (DULCOLAX) 5 MG EC tablet Take 1 tablet (5 mg) by mouth daily as needed for constipation       budesonide-formoterol (SYMBICORT) 80-4.5 MCG/ACT Inhaler Inhale 2 puffs into the lungs 2 times daily       calcium carbonate-vitamin D (OS-CARLOS) 500-400 MG-UNIT tablet Take 1 tablet by mouth daily       diclofenac (VOLTAREN) 1 % topical gel Apply 2 g topically 4 times daily as needed for moderate pain       ferrous sulfate (IRON) 325 (65 FE) MG tablet Take 325 mg by mouth daily (with breakfast)        furosemide (LASIX) 20 MG tablet Take 1 tablet (20 mg) by mouth daily 30 tablet 0     gabapentin (NEURONTIN) 300 MG capsule Take 300 mg by mouth 2 times daily       hydrOXYzine (ATARAX) 10 MG tablet Take 10 mg by mouth daily as needed for itching Give 10 mg by mouth as needed for anxiety/pain QD PRN       Immune Globulin, Human, (OCTAGAM) 5 GM/100ML SOLN 300 mg/kg into the vein every 30 days    Hold this medication while in TCU-resume at discharge from TCU       ipratropium - albuterol 0.5 mg/2.5 mg/3 mL (DUONEB) 0.5-2.5 (3) MG/3ML neb solution Take 1 vial (3 mLs) by nebulization 4 times daily X one week then can change to prn       levothyroxine (SYNTHROID/LEVOTHROID) 75 MCG tablet Take 1 tablet (75 mcg) by mouth daily 90 tablet 4     Lidocaine (LIDOCARE) 4 % Patch Place 2 patches onto the skin every 24 hours To prevent lidocaine toxicity, patient should be patch free for 12 hrs daily.       metoprolol succinate ER (TOPROL-XL) 25 MG 24 hr tablet Take 1 tablet (25 mg) by mouth daily 90 tablet 4     Multiple Vitamins-Minerals (MULTIVITAMIN GUMMIES ADULT PO) Take 2 tablets by mouth daily        nystatin (MYCOSTATIN) 443450 UNIT/ML suspension Take 5 mLs by mouth 4 times daily X 7 days, swish and swallow, for thrusgh       oxyCODONE (ROXICODONE) 5 MG tablet Take  "2.5 mg by mouth every 4 hours as needed for severe pain       oxyCODONE (ROXICODONE) 5 MG tablet Take 0.5 tablets (2.5 mg) by mouth every 4 hours X one week, then decrease to q 6 hours x one week, then to q 8 hours x one week, then to q 12 hours x one week then discontinue this order and continue only with prn oxycodone and have TCU MD/NH wean the prn oxy 60 tablet 0     [START ON 8/24/2019] oxyCODONE (ROXICODONE) 5 MG tablet Take 0.5-1 tablets (2.5-5 mg) by mouth every 4 hours as needed for moderate to severe pain 15 tablet 0     polyethylene glycol (MIRALAX/GLYCOLAX) packet Take 17 g by mouth 2 times daily       senna-docusate (SENOKOT-S/PERICOLACE) 8.6-50 MG tablet Take 2 tablets by mouth 2 times daily       sertraline (ZOLOFT) 50 MG tablet Take 1 tablet (50 mg) by mouth daily 90 tablet 1     traZODone (DESYREL) 50 MG tablet Take 2 tablets (100 mg) by mouth At Bedtime 90 tablet 0         REVIEW OF SYSTEMS:  10 point ROS of systems including Constitutional, Eyes, Respiratory, Cardiovascular, Gastroenterology, Genitourinary, Integumentary, Musculoskeletal, Psychiatric were all negative except for pertinent positives noted in my HPI.    Objective:  /66   Pulse 80   Temp 98  F (36.7  C)   Resp 17   Ht 1.499 m (4' 11\")   Wt 49.8 kg (109 lb 12.8 oz)   SpO2 90%   BMI 22.18 kg/m     Exam:  GENERAL APPEARANCE:  Alert, thin, anxious  RESP:  rales chronic fibrotic changes heard throughout, using accessory muscles  CV:  Palpation and auscultation of heart done , regular rate and rhythm, no murmur, rub, or gallop, no edema  ABDOMEN:  normal bowel sounds, soft, nontender, no hepatosplenomegaly or other masses  M/S:   Gait and station abnormal requires assist with transfers and bedmobility due to left shoulder fracture  Digits and nails abnormal arthritic changes  non wieght bearing left arm  SKIN:  Inspection of skin and subcutaneous tissue baseline, Palpation of skin and subcutaneous tissue baseline, thin, " "31.4 (*)     RDW-SD 55.5 (*)     Neutrophil % 81.3 (*)     Lymphocyte % 8.0 (*)     Eosinophil % 0.0 (*)     Immature Grans % 0.6 (*)     Neutrophils, Absolute 7.90 (*)     Monocytes, Absolute 0.98 (*)     Immature Grans, Absolute 0.06 (*)     All other components within normal limits   PROCALCITONIN - Abnormal; Notable for the following components:    Procalcitonin 0.51 (*)     All other components within normal limits    Narrative:     As a Marker for Sepsis (Non-Neonates):    1. <0.5 ng/mL represents a low risk of severe sepsis and/or septic shock.  2. >2 ng/mL represents a high risk of severe sepsis and/or septic shock.    As a Marker for Lower Respiratory Tract Infections that require antibiotic therapy:    PCT on Admission    Antibiotic Therapy       6-12 Hrs later    >0.5                Strongly Recommended  >0.25 - <0.5        Recommended   0.1 - 0.25          Discouraged              Remeasure/reassess PCT  <0.1                Strongly Discouraged     Remeasure/reassess PCT    As 28 day mortality risk marker: \"Change in Procalcitonin Result\" (>80% or <=80%) if Day 0 (or Day 1) and Day 4 values are available. Refer to [email or web address]    Change in PCT <=80%  A decrease of PCT levels below or equal to 80% defines a positive change in PCT test result representing a higher risk for 28-day all-cause mortality of patients diagnosed with severe sepsis for septic shock.    Change in PCT >80%  A decrease of PCT levels of more than 80% defines a negative change in PCT result representing a lower risk for 28-day all-cause mortality of patients diagnosed with severe sepsis or septic shock.      SEDIMENTATION RATE - Abnormal; Notable for the following components:    Sed Rate 24 (*)     All other components within normal limits   C-REACTIVE PROTEIN - Abnormal; Notable for the following components:    C-Reactive Protein 4.58 (*)     All other components within normal limits   HIGH SENSITIVITIY TROPONIN " T 2HR - Abnormal; Notable for the following components:    HS Troponin T 82 (*)     Troponin T Delta -4 (*)     All other components within normal limits    Narrative:     High Sensitive Troponin T Reference Range:  <14.0 ng/L- Negative Female for AMI  <22.0 ng/L- Negative Male for AMI  >=14 - Abnormal Female indicating possible myocardial injury.  >=22 - Abnormal Male indicating possible myocardial injury.   Clinicians would have to utilize clinical acumen, EKG, Troponin, and serial changes to determine if it is an Acute Myocardial Infarction or myocardial injury due to an underlying chronic condition.        LACTIC ACID, PLASMA - Normal   LACTATE DEHYDROGENASE - Normal   MAGNESIUM - Normal   T4, FREE - Normal   TSH - Normal   BLOOD CULTURE   BLOOD CULTURE   LEGIONELLA ANTIGEN, URINE   STREP PNEUMO AG, URINE OR CSF   RESPIRATORY CULTURE   RAINBOW DRAW    Narrative:     The following orders were created for panel order [place] Draw.  Procedure                               Abnormality         Status                     ---------                               -----------         ------                     Green Top (Gel)[[id number]]                                  Final result               Lavender Top[[id number]]                                     Final result               Gold Top - SST[[id number]]                                   Final result               Muniz Top[[id number]]                                         Final result               Light Blue Top[[id number]]                                   Final result                 Please view results for these tests on the individual orders.   POCT GLUCOSE FINGERSTICK   POCT GLUCOSE FINGERSTICK   POCT GLUCOSE FINGERSTICK   POCT GLUCOSE FINGERSTICK   CBC AND DIFFERENTIAL    Narrative:     The following orders were created for panel order CBC & Differential.  Procedure                               Abnormality         Status                     ---------              fragile, bruising noted multiple areas both upper and lower extremities.  skin tears are bandaged    Labs:   Recent labs in [facility] reviewed by me today.   WBC   Date Value Ref Range Status   07/29/2019 62.9 (HH) 4.0 - 11.0 10e9/L Corrected     Comment:     Critical Value called to and read back by  MARISSA CHAPPELL NURSE AT [facility] RDIGES AT 0925 LC  CORRECTED ON 07/29 AT 1024: PREVIOUSLY REPORTED AS 62.9 .          ASSESSMENT/PLAN:  (S42.92XD) Closed fracture of left shoulder with routine healing, subsequent encounter  (primary encounter diagnosis)  (M48.00) Central spinal stenosis  (M54.9) Back pain with radiation  Comment: uncontrolled pain  Plan: scheduled Oxycodone, instructed her to ask for as needed pain medication if she is having pain >6/10  Bowel protocol while on narcotic pain medication  Ice PRN    (C91.90) CLL (chronic lymphocytic leukemia) ([facility])  Comment: chemo on hold while in the TCU and due to labs and frailty  Plan: ordered WBC with Diff twice weekly   Update Oncology if WBC starts to rise        Electronically signed by:  NOHEMI Cedeño CNP             Sincerely,        NOHEMI Cedeño CNP                       -----------         ------                     CBC Auto Differential[[id number]]        Abnormal            Final result                 Please view results for these tests on the individual orders.   GREEN TOP   LAVENDER TOP   GOLD TOP - SST   GRAY TOP   LIGHT BLUE TOP       Meds Given in ED:   Medications   sodium chloride 0.9 % flush 10 mL (has no administration in time range)   albuterol sulfate HFA (PROVENTIL HFA;VENTOLIN HFA;PROAIR HFA) inhaler 2 puff (has no administration in time range)   dilTIAZem CD (CARDIZEM CD) 24 hr capsule 360 mg (has no administration in time range)   terazosin (HYTRIN) capsule 10 mg (has no administration in time range)   budesonide-formoterol (SYMBICORT) 160-4.5 MCG/ACT inhaler 2 puff (has no administration in time range)   hydrALAZINE (APRESOLINE) tablet 100 mg ( Oral Dose Auto Held 10/13/24 0900)   insulin glargine (LANTUS, SEMGLEE) injection 5 Units (has no administration in time range)   lisinopril (PRINIVIL,ZESTRIL) tablet 20 mg ( Oral Dose Auto Held 10/13/24 0900)     And   hydroCHLOROthiazide oral 25 mg ( Oral Dose Auto Held 10/13/24 0900)   montelukast (SINGULAIR) chewable tablet 4 mg (has no administration in time range)   predniSONE (DELTASONE) tablet 10 mg ( Oral Dose Auto Held 10/13/24 0900)   rivaroxaban (XARELTO) tablet 20 mg (has no administration in time range)   spironolactone (ALDACTONE) tablet 25 mg (has no administration in time range)   tiotropium (SPIRIVA RESPIMAT) 2.5 mcg/act aerosol solution inhaler (has no administration in time range)   nitroglycerin (NITROSTAT) SL tablet 0.4 mg (has no administration in time range)   furosemide (LASIX) injection 40 mg (has no administration in time range)   Potassium Replacement - Follow Nurse / BPA Driven Protocol (has no administration in time range)   Magnesium Standard Dose Replacement - Follow Nurse / BPA Driven Protocol (has no administration in time range)   Phosphorus Replacement - Follow Nurse /  BPA Driven Protocol (has no administration in time range)   Calcium Replacement - Follow Nurse / BPA Driven Protocol (has no administration in time range)   Pharmacy Consult - Remdesivir for Mild to Moderate COVID-19 (Within 6-7 days of symptom onset) (has no administration in time range)   benzonatate (TESSALON) capsule 100 mg (has no administration in time range)   dexAMETHasone (DECADRON) tablet 6 mg (has no administration in time range)   cefTRIAXone (ROCEPHIN) 1,000 mg in sodium chloride 0.9 % 100 mL MBP (has no administration in time range)   azithromycin (ZITHROMAX) tablet 500 mg (has no administration in time range)   clotrimazole (MYCELEX) jocelyn 10 mg (has no administration in time range)   lactobacillus acidophilus (RISAQUAD) capsule 1 capsule (has no administration in time range)   sennosides-docusate (PERICOLACE) 8.6-50 MG per tablet 2 tablet (has no administration in time range)     And   polyethylene glycol (MIRALAX) packet 17 g (has no administration in time range)     And   bisacodyl (DULCOLAX) EC tablet 5 mg (has no administration in time range)     And   bisacodyl (DULCOLAX) suppository 10 mg (has no administration in time range)   albuterol sulfate HFA (PROVENTIL HFA;VENTOLIN HFA;PROAIR HFA) inhaler 2 puff (has no administration in time range)   guaiFENesin (MUCINEX) 12 hr tablet 1,200 mg (has no administration in time range)   dextrose (GLUTOSE) oral gel 15 g (has no administration in time range)   dextrose (D50W) (25 g/50 mL) IV injection 25 g (has no administration in time range)   glucagon (GLUCAGEN) injection 1 mg (has no administration in time range)   Insulin Lispro (humaLOG) injection 2-7 Units (has no administration in time range)   remdesivir 200 mg in sodium chloride 0.9 % 290 mL IVPB (powder vial) (has no administration in time range)     Followed by   remdesivir 100 mg in sodium chloride 0.9 % 250 mL IVPB (powder vial) (has no administration in time range)   dexAMETHasone (DECADRON)  injection 6 mg (6 mg Intravenous Given 10/5/24 1141)   albuterol sulfate HFA (PROVENTIL HFA;VENTOLIN HFA;PROAIR HFA) inhaler 2 puff (2 puffs Inhalation Given 10/5/24 1110)   cefTRIAXone (ROCEPHIN) 2,000 mg in sodium chloride 0.9 % 100 mL MBP (0 mg Intravenous Stopped 10/5/24 1235)   azithromycin (ZITHROMAX) 500 mg in sodium chloride 0.9 % 250 mL IVPB-VTB (0 mg Intravenous Stopped 10/5/24 1412)     Pharmacy Consult - Remdesivir,          Last NIH score:                                                          Dysphagia screening results:  Patient Factors Component (Dysphagia:Stroke or Rule-out)  Best Eye Response: 4-->(E4) spontaneous (10/05/24 1100)  Best Motor Response: 6-->(M6) obeys commands (10/05/24 1100)  Best Verbal Response: 5-->(V5) oriented (10/05/24 1100)  Henry Coma Scale Score: 15 (10/05/24 1100)     Henry Coma Scale:  No data recorded     CIWA:        Restraint Type:            Isolation Status:  Contact and Airborne

## (undated) DEVICE — PACK MINOR CUSTOM RIDGES SBA32RMRMA

## (undated) DEVICE — MANIFOLD KIT ANGIO AUTOMATED 014613

## (undated) DEVICE — PACK TOTAL HIP RIDGES LATEX PO15HIFSG

## (undated) DEVICE — NDL SPINAL 18GA 3.5" 405184

## (undated) DEVICE — LINEN HALF SHEET 5512

## (undated) DEVICE — BAG CLEAR TRASH 1.3M 39X33" P4040C

## (undated) DEVICE — SU ETHIBOND 0 CT-1 CR 8X18" CX21D

## (undated) DEVICE — INTRO GLIDESHEATH SLENDER 6FR 10X45CM 60-1060

## (undated) DEVICE — GLOVE PROTEXIS POWDER FREE 7.5 ORTHOPEDIC 2D73ET75

## (undated) DEVICE — BONE CEMENT MIXING SYSTEM REVOLUTION

## (undated) DEVICE — GLOVE PROTEXIS BLUE W/NEU-THERA 8.0  2D73EB80

## (undated) DEVICE — HANDPIECE INTERPULSE W/HIGH FLOW TIP & SUCTION

## (undated) DEVICE — GLOVE PROTEXIS BLUE W/NEU-THERA 7.0  2D73EB70

## (undated) DEVICE — SUTURE VICRYL+ 0 CT-1 18" DYED VIO VCP740D

## (undated) DEVICE — DRSG AQUACEL AG HYDROFIBER 3.5X6" 422604

## (undated) DEVICE — PREP CHLORAPREP 26ML TINTED ORANGE  260815

## (undated) DEVICE — LINEN TOWEL PACK X10 5473

## (undated) DEVICE — SOL NACL 0.9% IRRIG 3000ML BAG 2B7477

## (undated) DEVICE — LINEN ORTHO ACL PACK 5447

## (undated) DEVICE — BLADE SAW SAGITTAL STRK 25X90X1.27MM HD SYS 6 6125-127-090

## (undated) DEVICE — SU VICRYL 2-0 CT-1 27" UND J259H

## (undated) DEVICE — SU VICRYL 3-0 TIE 12X18" J904T

## (undated) DEVICE — SYR 30ML LL W/O NDL 302832

## (undated) DEVICE — SOL WATER IRRIG 1000ML BOTTLE 2F7114

## (undated) DEVICE — SU ETHIBOND 5 V-37 4X30" MB66G

## (undated) DEVICE — PACK CUSTOM CATH LAB RIDGES LF PC15CCFCJ

## (undated) DEVICE — NDL 22GA 1.5"

## (undated) DEVICE — HOOD FLYTE W/PEELAWAY 408-800-100

## (undated) DEVICE — CATH JACKY 5FR 3.5 CURVE 40-5023

## (undated) DEVICE — LINEN FULL SHEET 5511

## (undated) DEVICE — GLOVE PROTEXIS POWDER FREE 8.5 ORTHOPEDIC 2D73ET85

## (undated) DEVICE — SU FIBERWIRE 2 38"  AR-7200

## (undated) DEVICE — BLADE SAW SAGITTAL STRK 18X90X1.27MM HD SYS 6 6118-127-090

## (undated) DEVICE — BONE CLEANING TIP INTERPULSE FEMORAL CANAL 0210-008-000

## (undated) DEVICE — DRAPE LAP W/ARMBOARD 29410

## (undated) DEVICE — GLOVE PROTEXIS POWDER FREE SMT 8.0  2D72PT80X

## (undated) DEVICE — CLEANSER WOUND IRRISEPT 0.05% CHG IRRISEPT-403

## (undated) DEVICE — TRAY CATH SURESTEP OD14 FR INTERMITTENT STER LTX INTS14

## (undated) DEVICE — 0.035" X 180CM BENTSON STRAIGHT CEREBRAL FIXED CORE GUIDEWIRE, TFE COATED (TSFB-35-180)

## (undated) DEVICE — SU VICRYL 4-0 PS-2 18" UND J496H

## (undated) DEVICE — SPONGE KITTNER 30-101

## (undated) DEVICE — NDL BLUNT 18GA 1.5" W/O FILTER 305180

## (undated) DEVICE — ESU GROUND PAD ADULT W/CORD E7507

## (undated) DEVICE — SLEEVE TR BAND RADIAL COMPRESSION DEVICE 24CM TRB24-REG

## (undated) DEVICE — DRSG GAUZE 4X4" 8044

## (undated) DEVICE — LINEN TOWEL PACK X5 5464

## (undated) DEVICE — PRO-PADZ LIQUID GEL RADIOLUCENT MFE, ADULT (1 SET)

## (undated) DEVICE — IMM PILLOW ABDUCT HIP MED M60-025-M

## (undated) DEVICE — DRAIN HEMOVAC RESERVOIR KIT 10FR 1/8" MED 00-2550-002-10

## (undated) DEVICE — SUTURE MONOCRYL+ 2-0 CT-1 36" UNDYED MCP945H

## (undated) DEVICE — Device

## (undated) DEVICE — SU VICRYL 1 CT-1 27" J341H

## (undated) DEVICE — LINEN DRAPE 54X72" 5467

## (undated) DEVICE — GLOVE PROTEXIS W/NEU-THERA 7.0  2D73TE70

## (undated) DEVICE — SU DERMABOND MINI DHVM12

## (undated) DEVICE — GLOVE PROTEXIS BLUE W/NEU-THERA 8.5  2D73EB85

## (undated) DEVICE — SUCTION MANIFOLD NEPTUNE 2 SYS 4 PORT 0702-020-000

## (undated) DEVICE — KIT HAND CONTROL ANGIOTOUCH ACIST 65CM AT-P65

## (undated) DEVICE — SYR 10ML LL W/O NDL 302995

## (undated) DEVICE — GLOVE PROTEXIS W/NEU-THERA 8.5  2D73TE85

## (undated) DEVICE — PREP POVIDONE IODINE SOLUTION 10% 4OZ BOTTLE 29906-004

## (undated) DEVICE — GLOVE PROTEXIS POWDER FREE 7.0 ORTHOPEDIC 2D73ET70

## (undated) DEVICE — SU VICRYL 0 CT-1 36" J346H

## (undated) RX ORDER — ONDANSETRON 2 MG/ML
INJECTION INTRAMUSCULAR; INTRAVENOUS
Status: DISPENSED
Start: 2022-01-01

## (undated) RX ORDER — LIDOCAINE HYDROCHLORIDE 10 MG/ML
INJECTION, SOLUTION EPIDURAL; INFILTRATION; INTRACAUDAL; PERINEURAL
Status: DISPENSED
Start: 2018-11-19

## (undated) RX ORDER — TRANEXAMIC ACID 10 MG/ML
INJECTION, SOLUTION INTRAVENOUS
Status: DISPENSED
Start: 2022-01-01

## (undated) RX ORDER — HYDROMORPHONE HYDROCHLORIDE 1 MG/ML
INJECTION, SOLUTION INTRAMUSCULAR; INTRAVENOUS; SUBCUTANEOUS
Status: DISPENSED
Start: 2018-11-19

## (undated) RX ORDER — ONDANSETRON 2 MG/ML
INJECTION INTRAMUSCULAR; INTRAVENOUS
Status: DISPENSED
Start: 2018-11-19

## (undated) RX ORDER — EPHEDRINE SULFATE 50 MG/ML
INJECTION, SOLUTION INTRAMUSCULAR; INTRAVENOUS; SUBCUTANEOUS
Status: DISPENSED
Start: 2018-11-19

## (undated) RX ORDER — PROPOFOL 10 MG/ML
INJECTION, EMULSION INTRAVENOUS
Status: DISPENSED
Start: 2018-11-19

## (undated) RX ORDER — FENTANYL CITRATE 50 UG/ML
INJECTION, SOLUTION INTRAMUSCULAR; INTRAVENOUS
Status: DISPENSED
Start: 2018-11-19

## (undated) RX ORDER — LIDOCAINE HYDROCHLORIDE 10 MG/ML
INJECTION, SOLUTION EPIDURAL; INFILTRATION; INTRACAUDAL; PERINEURAL
Status: DISPENSED
Start: 2022-01-01

## (undated) RX ORDER — BUPIVACAINE HYDROCHLORIDE 5 MG/ML
INJECTION, SOLUTION EPIDURAL; INTRACAUDAL
Status: DISPENSED
Start: 2018-10-23

## (undated) RX ORDER — PHENYLEPHRINE HCL IN 0.9% NACL 1 MG/10 ML
SYRINGE (ML) INTRAVENOUS
Status: DISPENSED
Start: 2018-11-19

## (undated) RX ORDER — DEXAMETHASONE SODIUM PHOSPHATE 4 MG/ML
INJECTION, SOLUTION INTRA-ARTICULAR; INTRALESIONAL; INTRAMUSCULAR; INTRAVENOUS; SOFT TISSUE
Status: DISPENSED
Start: 2022-01-01

## (undated) RX ORDER — FENTANYL CITRATE-0.9 % NACL/PF 10 MCG/ML
PLASTIC BAG, INJECTION (ML) INTRAVENOUS
Status: DISPENSED
Start: 2022-01-01

## (undated) RX ORDER — GLYCOPYRROLATE 0.2 MG/ML
INJECTION INTRAMUSCULAR; INTRAVENOUS
Status: DISPENSED
Start: 2018-11-19

## (undated) RX ORDER — CEFAZOLIN SODIUM 2 G/100ML
INJECTION, SOLUTION INTRAVENOUS
Status: DISPENSED
Start: 2018-11-19

## (undated) RX ORDER — DEXAMETHASONE SODIUM PHOSPHATE 4 MG/ML
INJECTION, SOLUTION INTRA-ARTICULAR; INTRALESIONAL; INTRAMUSCULAR; INTRAVENOUS; SOFT TISSUE
Status: DISPENSED
Start: 2018-11-19

## (undated) RX ORDER — PANTOPRAZOLE SODIUM 40 MG/1
TABLET, DELAYED RELEASE ORAL
Status: DISPENSED
Start: 2018-11-19

## (undated) RX ORDER — CLINDAMYCIN PHOSPHATE 900 MG/50ML
INJECTION, SOLUTION INTRAVENOUS
Status: DISPENSED
Start: 2022-01-01

## (undated) RX ORDER — PROPOFOL 10 MG/ML
INJECTION, EMULSION INTRAVENOUS
Status: DISPENSED
Start: 2022-01-01

## (undated) RX ORDER — LIDOCAINE HYDROCHLORIDE 10 MG/ML
INJECTION, SOLUTION EPIDURAL; INFILTRATION; INTRACAUDAL; PERINEURAL
Status: DISPENSED
Start: 2018-10-23

## (undated) RX ORDER — IPRATROPIUM BROMIDE AND ALBUTEROL SULFATE 2.5; .5 MG/3ML; MG/3ML
SOLUTION RESPIRATORY (INHALATION)
Status: DISPENSED
Start: 2018-11-19

## (undated) RX ORDER — CEFAZOLIN SODIUM/WATER 2 G/20 ML
SYRINGE (ML) INTRAVENOUS
Status: DISPENSED
Start: 2022-01-01